# Patient Record
Sex: MALE | Race: WHITE | NOT HISPANIC OR LATINO | Employment: OTHER | URBAN - METROPOLITAN AREA
[De-identification: names, ages, dates, MRNs, and addresses within clinical notes are randomized per-mention and may not be internally consistent; named-entity substitution may affect disease eponyms.]

---

## 2017-01-05 ENCOUNTER — ALLSCRIPTS OFFICE VISIT (OUTPATIENT)
Dept: WOUND CARE | Facility: HOSPITAL | Age: 67
End: 2017-01-05
Attending: PODIATRIST
Payer: MEDICARE

## 2017-01-05 PROCEDURE — 15004 WOUND PREP F/N/HF/G: CPT | Performed by: PODIATRIST

## 2017-01-19 ENCOUNTER — ALLSCRIPTS OFFICE VISIT (OUTPATIENT)
Dept: WOUND CARE | Facility: HOSPITAL | Age: 67
End: 2017-01-19
Attending: PODIATRIST
Payer: MEDICARE

## 2017-01-19 PROCEDURE — 15275 SKIN SUB GRAFT FACE/NK/HF/G: CPT | Performed by: PODIATRIST

## 2017-01-26 ENCOUNTER — ALLSCRIPTS OFFICE VISIT (OUTPATIENT)
Dept: WOUND CARE | Facility: HOSPITAL | Age: 67
End: 2017-01-26
Attending: PODIATRIST
Payer: MEDICARE

## 2017-01-26 PROCEDURE — 97597 DBRDMT OPN WND 1ST 20 CM/<: CPT | Performed by: PODIATRIST

## 2017-02-02 ENCOUNTER — ALLSCRIPTS OFFICE VISIT (OUTPATIENT)
Dept: WOUND CARE | Facility: HOSPITAL | Age: 67
End: 2017-02-02
Attending: PODIATRIST
Payer: MEDICARE

## 2017-02-02 PROCEDURE — 15275 SKIN SUB GRAFT FACE/NK/HF/G: CPT | Performed by: PODIATRIST

## 2017-02-08 ENCOUNTER — ALLSCRIPTS OFFICE VISIT (OUTPATIENT)
Dept: OTHER | Facility: OTHER | Age: 67
End: 2017-02-08

## 2017-02-10 ENCOUNTER — ALLSCRIPTS OFFICE VISIT (OUTPATIENT)
Dept: WOUND CARE | Facility: HOSPITAL | Age: 67
End: 2017-02-10
Attending: PODIATRIST
Payer: MEDICARE

## 2017-02-10 PROCEDURE — 99211 OFF/OP EST MAY X REQ PHY/QHP: CPT | Performed by: PODIATRIST

## 2017-02-13 ENCOUNTER — ALLSCRIPTS OFFICE VISIT (OUTPATIENT)
Dept: OTHER | Facility: OTHER | Age: 67
End: 2017-02-13

## 2017-02-16 ENCOUNTER — ALLSCRIPTS OFFICE VISIT (OUTPATIENT)
Dept: WOUND CARE | Facility: HOSPITAL | Age: 67
End: 2017-02-16
Attending: PODIATRIST
Payer: MEDICARE

## 2017-02-16 PROCEDURE — 11042 DBRDMT SUBQ TIS 1ST 20SQCM/<: CPT | Performed by: PODIATRIST

## 2017-02-23 ENCOUNTER — ALLSCRIPTS OFFICE VISIT (OUTPATIENT)
Dept: WOUND CARE | Facility: HOSPITAL | Age: 67
End: 2017-02-23
Attending: PODIATRIST
Payer: MEDICARE

## 2017-02-23 PROCEDURE — 15275 SKIN SUB GRAFT FACE/NK/HF/G: CPT | Performed by: PODIATRIST

## 2017-03-02 ENCOUNTER — ALLSCRIPTS OFFICE VISIT (OUTPATIENT)
Dept: WOUND CARE | Facility: HOSPITAL | Age: 67
End: 2017-03-02
Attending: PODIATRIST
Payer: MEDICARE

## 2017-03-02 PROCEDURE — 99212 OFFICE O/P EST SF 10 MIN: CPT | Performed by: PODIATRIST

## 2017-03-09 ENCOUNTER — ALLSCRIPTS OFFICE VISIT (OUTPATIENT)
Dept: WOUND CARE | Facility: HOSPITAL | Age: 67
End: 2017-03-09
Attending: PODIATRIST
Payer: MEDICARE

## 2017-03-09 PROCEDURE — 11042 DBRDMT SUBQ TIS 1ST 20SQCM/<: CPT | Performed by: PODIATRIST

## 2017-03-16 ENCOUNTER — ALLSCRIPTS OFFICE VISIT (OUTPATIENT)
Dept: WOUND CARE | Facility: HOSPITAL | Age: 67
End: 2017-03-16
Attending: PODIATRIST
Payer: MEDICARE

## 2017-03-16 PROCEDURE — 99212 OFFICE O/P EST SF 10 MIN: CPT | Performed by: PODIATRIST

## 2017-03-30 ENCOUNTER — ALLSCRIPTS OFFICE VISIT (OUTPATIENT)
Dept: WOUND CARE | Facility: HOSPITAL | Age: 67
End: 2017-03-30
Attending: PODIATRIST
Payer: MEDICARE

## 2017-03-30 PROCEDURE — 99212 OFFICE O/P EST SF 10 MIN: CPT | Performed by: PODIATRIST

## 2017-04-10 DIAGNOSIS — Z12.5 ENCOUNTER FOR SCREENING FOR MALIGNANT NEOPLASM OF PROSTATE: ICD-10-CM

## 2017-04-10 DIAGNOSIS — E11.65 TYPE 2 DIABETES MELLITUS WITH HYPERGLYCEMIA (HCC): ICD-10-CM

## 2017-05-25 ENCOUNTER — ALLSCRIPTS OFFICE VISIT (OUTPATIENT)
Dept: WOUND CARE | Facility: HOSPITAL | Age: 67
End: 2017-05-25
Payer: MEDICARE

## 2017-05-25 PROCEDURE — 99213 OFFICE O/P EST LOW 20 MIN: CPT | Performed by: PODIATRIST

## 2017-05-25 PROCEDURE — 11042 DBRDMT SUBQ TIS 1ST 20SQCM/<: CPT | Performed by: PODIATRIST

## 2017-05-26 ENCOUNTER — LAB CONVERSION - ENCOUNTER (OUTPATIENT)
Dept: OTHER | Facility: OTHER | Age: 67
End: 2017-05-26

## 2017-05-26 ENCOUNTER — GENERIC CONVERSION - ENCOUNTER (OUTPATIENT)
Dept: OTHER | Facility: OTHER | Age: 67
End: 2017-05-26

## 2017-05-26 DIAGNOSIS — E11.51 TYPE 2 DIABETES MELLITUS WITH DIABETIC PERIPHERAL ANGIOPATHY WITHOUT GANGRENE (HCC): ICD-10-CM

## 2017-05-26 DIAGNOSIS — R74.8 ABNORMAL LEVELS OF OTHER SERUM ENZYMES: ICD-10-CM

## 2017-05-26 DIAGNOSIS — E11.65 TYPE 2 DIABETES MELLITUS WITH HYPERGLYCEMIA (HCC): ICD-10-CM

## 2017-05-26 LAB
A/G RATIO (HISTORICAL): 1.1 (CALC) (ref 1–2.5)
ALBUMIN SERPL BCP-MCNC: 4 G/DL (ref 3.6–5.1)
ALP SERPL-CCNC: 150 U/L (ref 40–115)
ALT SERPL W P-5'-P-CCNC: 10 U/L (ref 9–46)
AST SERPL W P-5'-P-CCNC: 13 U/L (ref 10–35)
BILIRUB SERPL-MCNC: 0.6 MG/DL (ref 0.2–1.2)
BUN SERPL-MCNC: 30 MG/DL (ref 7–25)
BUN/CREA RATIO (HISTORICAL): 23 (CALC) (ref 6–22)
CALCIUM SERPL-MCNC: 9.2 MG/DL (ref 8.6–10.3)
CHLORIDE SERPL-SCNC: 101 MMOL/L (ref 98–110)
CHOLEST SERPL-MCNC: 147 MG/DL (ref 125–200)
CHOLEST/HDLC SERPL: 4 (CALC)
CO2 SERPL-SCNC: 28 MMOL/L (ref 20–31)
CREAT SERPL-MCNC: 1.31 MG/DL (ref 0.7–1.25)
EGFR AFRICAN AMERICAN (HISTORICAL): 66 ML/MIN/1.73M2
EGFR-AMERICAN CALC (HISTORICAL): 57 ML/MIN/1.73M2
GAMMA GLOBULIN (HISTORICAL): 3.7 G/DL (CALC) (ref 1.9–3.7)
GLUCOSE (HISTORICAL): 156 MG/DL (ref 65–99)
HBA1C MFR BLD HPLC: 9.9 % OF TOTAL HGB
HDLC SERPL-MCNC: 37 MG/DL
LDL CHOLESTEROL (HISTORICAL): 77 MG/DL (CALC)
NON-HDL-CHOL (CHOL-HDL) (HISTORICAL): 110 MG/DL (CALC)
POTASSIUM SERPL-SCNC: 4.7 MMOL/L (ref 3.5–5.3)
PROSTATE SPECIFIC ANTIGEN TOTAL (HISTORICAL): 0.5 NG/ML
SODIUM SERPL-SCNC: 139 MMOL/L (ref 135–146)
TOTAL PROTEIN (HISTORICAL): 7.7 G/DL (ref 6.1–8.1)
TRIGL SERPL-MCNC: 163 MG/DL
TSH SERPL DL<=0.05 MIU/L-ACNC: 2.61 MIU/L (ref 0.4–4.5)

## 2017-06-01 ENCOUNTER — ALLSCRIPTS OFFICE VISIT (OUTPATIENT)
Dept: WOUND CARE | Facility: HOSPITAL | Age: 67
End: 2017-06-01
Attending: PODIATRIST
Payer: MEDICARE

## 2017-06-01 PROCEDURE — 11042 DBRDMT SUBQ TIS 1ST 20SQCM/<: CPT | Performed by: PODIATRIST

## 2017-06-02 ENCOUNTER — LAB CONVERSION - ENCOUNTER (OUTPATIENT)
Dept: OTHER | Facility: OTHER | Age: 67
End: 2017-06-02

## 2017-06-02 ENCOUNTER — GENERIC CONVERSION - ENCOUNTER (OUTPATIENT)
Dept: OTHER | Facility: OTHER | Age: 67
End: 2017-06-02

## 2017-06-02 LAB — GLUCOSE, 2 HR PP (HISTORICAL): 285 MG/DL

## 2017-06-05 ENCOUNTER — ALLSCRIPTS OFFICE VISIT (OUTPATIENT)
Dept: OTHER | Facility: OTHER | Age: 67
End: 2017-06-05

## 2017-06-08 ENCOUNTER — ALLSCRIPTS OFFICE VISIT (OUTPATIENT)
Dept: WOUND CARE | Facility: HOSPITAL | Age: 67
End: 2017-06-08
Attending: PODIATRIST
Payer: MEDICARE

## 2017-06-08 PROCEDURE — 99212 OFFICE O/P EST SF 10 MIN: CPT | Performed by: PODIATRIST

## 2017-06-22 ENCOUNTER — ALLSCRIPTS OFFICE VISIT (OUTPATIENT)
Dept: WOUND CARE | Facility: HOSPITAL | Age: 67
End: 2017-06-22
Attending: PODIATRIST
Payer: MEDICARE

## 2017-06-22 PROCEDURE — 97597 DBRDMT OPN WND 1ST 20 CM/<: CPT | Performed by: PODIATRIST

## 2017-06-29 ENCOUNTER — APPOINTMENT (OUTPATIENT)
Dept: WOUND CARE | Facility: HOSPITAL | Age: 67
End: 2017-06-29
Attending: PODIATRIST
Payer: MEDICARE

## 2017-06-29 PROCEDURE — 99212 OFFICE O/P EST SF 10 MIN: CPT | Performed by: PODIATRIST

## 2017-07-01 ENCOUNTER — HOSPITAL ENCOUNTER (OUTPATIENT)
Dept: ULTRASOUND IMAGING | Facility: HOSPITAL | Age: 67
Discharge: HOME | End: 2017-07-01
Payer: COMMERCIAL

## 2017-07-01 DIAGNOSIS — R74.8 ABNORMAL LEVELS OF OTHER SERUM ENZYMES: ICD-10-CM

## 2017-07-01 PROCEDURE — 76700 US EXAM ABDOM COMPLETE: CPT

## 2017-07-05 ENCOUNTER — GENERIC CONVERSION - ENCOUNTER (OUTPATIENT)
Dept: OTHER | Facility: OTHER | Age: 67
End: 2017-07-05

## 2017-07-13 ENCOUNTER — ALLSCRIPTS OFFICE VISIT (OUTPATIENT)
Dept: OTHER | Facility: OTHER | Age: 67
End: 2017-07-13

## 2017-07-13 ENCOUNTER — APPOINTMENT (OUTPATIENT)
Dept: WOUND CARE | Facility: HOSPITAL | Age: 67
End: 2017-07-13
Attending: PODIATRIST
Payer: MEDICARE

## 2017-07-13 PROCEDURE — 11042 DBRDMT SUBQ TIS 1ST 20SQCM/<: CPT | Performed by: PODIATRIST

## 2017-07-20 ENCOUNTER — APPOINTMENT (OUTPATIENT)
Dept: WOUND CARE | Facility: HOSPITAL | Age: 67
End: 2017-07-20
Attending: PODIATRIST
Payer: MEDICARE

## 2017-07-20 PROCEDURE — 11042 DBRDMT SUBQ TIS 1ST 20SQCM/<: CPT | Performed by: PODIATRIST

## 2017-07-27 ENCOUNTER — HOSPITAL ENCOUNTER (OUTPATIENT)
Dept: NON INVASIVE DIAGNOSTICS | Facility: CLINIC | Age: 67
Discharge: HOME | End: 2017-07-27
Payer: MEDICARE

## 2017-07-27 ENCOUNTER — APPOINTMENT (OUTPATIENT)
Dept: WOUND CARE | Facility: HOSPITAL | Age: 67
End: 2017-07-27
Attending: PODIATRIST
Payer: MEDICARE

## 2017-07-27 DIAGNOSIS — E11.51 TYPE 2 DIABETES MELLITUS WITH DIABETIC PERIPHERAL ANGIOPATHY WITHOUT GANGRENE (HCC): ICD-10-CM

## 2017-07-27 PROCEDURE — 93923 UPR/LXTR ART STDY 3+ LVLS: CPT

## 2017-07-27 PROCEDURE — 97597 DBRDMT OPN WND 1ST 20 CM/<: CPT | Performed by: PODIATRIST

## 2017-07-27 PROCEDURE — 93925 LOWER EXTREMITY STUDY: CPT

## 2017-07-30 ENCOUNTER — GENERIC CONVERSION - ENCOUNTER (OUTPATIENT)
Dept: OTHER | Facility: OTHER | Age: 67
End: 2017-07-30

## 2017-08-03 ENCOUNTER — APPOINTMENT (OUTPATIENT)
Dept: WOUND CARE | Facility: HOSPITAL | Age: 67
End: 2017-08-03
Attending: PODIATRIST
Payer: MEDICARE

## 2017-08-03 PROCEDURE — 97597 DBRDMT OPN WND 1ST 20 CM/<: CPT

## 2017-08-16 ENCOUNTER — ALLSCRIPTS OFFICE VISIT (OUTPATIENT)
Dept: OTHER | Facility: OTHER | Age: 67
End: 2017-08-16

## 2017-08-17 ENCOUNTER — APPOINTMENT (OUTPATIENT)
Dept: WOUND CARE | Facility: HOSPITAL | Age: 67
End: 2017-08-17
Attending: PODIATRIST
Payer: MEDICARE

## 2017-08-17 PROCEDURE — 99212 OFFICE O/P EST SF 10 MIN: CPT | Performed by: PODIATRIST

## 2017-08-31 ENCOUNTER — APPOINTMENT (OUTPATIENT)
Dept: WOUND CARE | Facility: HOSPITAL | Age: 67
End: 2017-08-31
Attending: PODIATRIST
Payer: MEDICARE

## 2017-08-31 ENCOUNTER — ALLSCRIPTS OFFICE VISIT (OUTPATIENT)
Dept: OTHER | Facility: OTHER | Age: 67
End: 2017-08-31

## 2017-08-31 PROCEDURE — 97597 DBRDMT OPN WND 1ST 20 CM/<: CPT | Performed by: PODIATRIST

## 2017-09-14 ENCOUNTER — APPOINTMENT (OUTPATIENT)
Dept: WOUND CARE | Facility: HOSPITAL | Age: 67
End: 2017-09-14
Attending: PODIATRIST
Payer: MEDICARE

## 2017-09-14 PROCEDURE — 99212 OFFICE O/P EST SF 10 MIN: CPT | Performed by: PODIATRIST

## 2017-09-20 ENCOUNTER — GENERIC CONVERSION - ENCOUNTER (OUTPATIENT)
Dept: OTHER | Facility: OTHER | Age: 67
End: 2017-09-20

## 2017-09-20 LAB
LEFT EYE DIABETIC RETINOPATHY: NORMAL
RIGHT EYE DIABETIC RETINOPATHY: NORMAL

## 2017-10-02 DIAGNOSIS — E78.5 HYPERLIPIDEMIA: ICD-10-CM

## 2017-10-02 DIAGNOSIS — E11.65 TYPE 2 DIABETES MELLITUS WITH HYPERGLYCEMIA (HCC): ICD-10-CM

## 2017-10-08 ENCOUNTER — APPOINTMENT (EMERGENCY)
Dept: RADIOLOGY | Facility: HOSPITAL | Age: 67
DRG: 683 | End: 2017-10-08
Payer: MEDICARE

## 2017-10-08 ENCOUNTER — APPOINTMENT (EMERGENCY)
Dept: NON INVASIVE DIAGNOSTICS | Facility: HOSPITAL | Age: 67
DRG: 683 | End: 2017-10-08
Payer: MEDICARE

## 2017-10-08 ENCOUNTER — HOSPITAL ENCOUNTER (INPATIENT)
Facility: HOSPITAL | Age: 67
LOS: 3 days | Discharge: NURSING, REHAB OR LONG-TERM CARE | DRG: 683 | End: 2017-10-11
Attending: FAMILY MEDICINE | Admitting: FAMILY MEDICINE
Payer: MEDICARE

## 2017-10-08 DIAGNOSIS — I48.20 CHRONIC ATRIAL FIBRILLATION (HCC): ICD-10-CM

## 2017-10-08 DIAGNOSIS — M10.9 GOUT FLARE: Primary | ICD-10-CM

## 2017-10-08 DIAGNOSIS — M79.89 SWELLING OF LIMB: ICD-10-CM

## 2017-10-08 DIAGNOSIS — N17.9 AKI (ACUTE KIDNEY INJURY) (HCC): ICD-10-CM

## 2017-10-08 LAB
ALBUMIN SERPL BCP-MCNC: 3.2 G/DL (ref 3.5–5)
ALP SERPL-CCNC: 178 U/L (ref 46–116)
ALT SERPL W P-5'-P-CCNC: 26 U/L (ref 12–78)
ANION GAP SERPL CALCULATED.3IONS-SCNC: 10 MMOL/L (ref 4–13)
APTT PPP: 38 SECONDS (ref 23–35)
AST SERPL W P-5'-P-CCNC: 15 U/L (ref 5–45)
BACTERIA UR QL AUTO: NORMAL /HPF
BASOPHILS # BLD AUTO: 0.01 THOUSANDS/ΜL (ref 0–0.1)
BASOPHILS NFR BLD AUTO: 0 % (ref 0–1)
BILIRUB SERPL-MCNC: 0.9 MG/DL (ref 0.2–1)
BILIRUB UR QL STRIP: NEGATIVE
BUN SERPL-MCNC: 30 MG/DL (ref 5–25)
CALCIUM SERPL-MCNC: 8.5 MG/DL (ref 8.3–10.1)
CHLORIDE SERPL-SCNC: 98 MMOL/L (ref 100–108)
CK SERPL-CCNC: 55 U/L (ref 39–308)
CLARITY UR: CLEAR
CO2 SERPL-SCNC: 26 MMOL/L (ref 21–32)
COLOR UR: YELLOW
CREAT SERPL-MCNC: 1.45 MG/DL (ref 0.6–1.3)
EOSINOPHIL # BLD AUTO: 0.05 THOUSAND/ΜL (ref 0–0.61)
EOSINOPHIL NFR BLD AUTO: 1 % (ref 0–6)
ERYTHROCYTE [DISTWIDTH] IN BLOOD BY AUTOMATED COUNT: 14.1 % (ref 11.6–15.1)
GFR SERPL CREATININE-BSD FRML MDRD: 50 ML/MIN/1.73SQ M
GLUCOSE SERPL-MCNC: 216 MG/DL (ref 65–140)
GLUCOSE SERPL-MCNC: 259 MG/DL (ref 65–140)
GLUCOSE SERPL-MCNC: 294 MG/DL (ref 65–140)
GLUCOSE UR STRIP-MCNC: NEGATIVE MG/DL
HCT VFR BLD AUTO: 41.2 % (ref 36.5–49.3)
HGB BLD-MCNC: 13 G/DL (ref 12–17)
HGB UR QL STRIP.AUTO: ABNORMAL
INR PPP: 1.13 (ref 0.86–1.16)
KETONES UR STRIP-MCNC: NEGATIVE MG/DL
LACTATE SERPL-SCNC: 1.4 MMOL/L (ref 0.5–2)
LEUKOCYTE ESTERASE UR QL STRIP: NEGATIVE
LYMPHOCYTES # BLD AUTO: 1.21 THOUSANDS/ΜL (ref 0.6–4.47)
LYMPHOCYTES NFR BLD AUTO: 14 % (ref 14–44)
MCH RBC QN AUTO: 28.5 PG (ref 26.8–34.3)
MCHC RBC AUTO-ENTMCNC: 31.6 G/DL (ref 31.4–37.4)
MCV RBC AUTO: 90 FL (ref 82–98)
MONOCYTES # BLD AUTO: 1.03 THOUSAND/ΜL (ref 0.17–1.22)
MONOCYTES NFR BLD AUTO: 12 % (ref 4–12)
NEUTROPHILS # BLD AUTO: 6.58 THOUSANDS/ΜL (ref 1.85–7.62)
NEUTS SEG NFR BLD AUTO: 73 % (ref 43–75)
NITRITE UR QL STRIP: NEGATIVE
NON-SQ EPI CELLS URNS QL MICRO: NORMAL /HPF
NT-PROBNP SERPL-MCNC: 1606 PG/ML
PH UR STRIP.AUTO: 5.5 [PH] (ref 4.5–8)
PLATELET # BLD AUTO: 228 THOUSANDS/UL (ref 149–390)
PMV BLD AUTO: 10.2 FL (ref 8.9–12.7)
POTASSIUM SERPL-SCNC: 4.5 MMOL/L (ref 3.5–5.3)
PROT SERPL-MCNC: 8.3 G/DL (ref 6.4–8.2)
PROT UR STRIP-MCNC: NEGATIVE MG/DL
PROTHROMBIN TIME: 14.3 SECONDS (ref 12.1–14.4)
RBC # BLD AUTO: 4.56 MILLION/UL (ref 3.88–5.62)
RBC #/AREA URNS AUTO: NORMAL /HPF
SODIUM SERPL-SCNC: 134 MMOL/L (ref 136–145)
SP GR UR STRIP.AUTO: 1.02 (ref 1–1.03)
TROPONIN I SERPL-MCNC: <0.02 NG/ML
UROBILINOGEN UR QL STRIP.AUTO: 0.2 E.U./DL
WBC # BLD AUTO: 8.88 THOUSAND/UL (ref 4.31–10.16)
WBC #/AREA URNS AUTO: NORMAL /HPF

## 2017-10-08 PROCEDURE — 80053 COMPREHEN METABOLIC PANEL: CPT | Performed by: PHYSICIAN ASSISTANT

## 2017-10-08 PROCEDURE — 93005 ELECTROCARDIOGRAM TRACING: CPT | Performed by: PHYSICIAN ASSISTANT

## 2017-10-08 PROCEDURE — 85730 THROMBOPLASTIN TIME PARTIAL: CPT | Performed by: PHYSICIAN ASSISTANT

## 2017-10-08 PROCEDURE — 99285 EMERGENCY DEPT VISIT HI MDM: CPT

## 2017-10-08 PROCEDURE — 85025 COMPLETE CBC W/AUTO DIFF WBC: CPT | Performed by: PHYSICIAN ASSISTANT

## 2017-10-08 PROCEDURE — 73090 X-RAY EXAM OF FOREARM: CPT

## 2017-10-08 PROCEDURE — 71020 HB CHEST X-RAY 2VW FRONTAL&LATL: CPT

## 2017-10-08 PROCEDURE — 73130 X-RAY EXAM OF HAND: CPT

## 2017-10-08 PROCEDURE — 82550 ASSAY OF CK (CPK): CPT | Performed by: PHYSICIAN ASSISTANT

## 2017-10-08 PROCEDURE — 96376 TX/PRO/DX INJ SAME DRUG ADON: CPT

## 2017-10-08 PROCEDURE — 83605 ASSAY OF LACTIC ACID: CPT | Performed by: PHYSICIAN ASSISTANT

## 2017-10-08 PROCEDURE — 85610 PROTHROMBIN TIME: CPT | Performed by: PHYSICIAN ASSISTANT

## 2017-10-08 PROCEDURE — 36415 COLL VENOUS BLD VENIPUNCTURE: CPT | Performed by: PHYSICIAN ASSISTANT

## 2017-10-08 PROCEDURE — 96374 THER/PROPH/DIAG INJ IV PUSH: CPT

## 2017-10-08 PROCEDURE — 83880 ASSAY OF NATRIURETIC PEPTIDE: CPT | Performed by: PHYSICIAN ASSISTANT

## 2017-10-08 PROCEDURE — 84484 ASSAY OF TROPONIN QUANT: CPT | Performed by: PHYSICIAN ASSISTANT

## 2017-10-08 PROCEDURE — 93971 EXTREMITY STUDY: CPT

## 2017-10-08 PROCEDURE — 81001 URINALYSIS AUTO W/SCOPE: CPT | Performed by: PHYSICIAN ASSISTANT

## 2017-10-08 PROCEDURE — 87040 BLOOD CULTURE FOR BACTERIA: CPT | Performed by: PHYSICIAN ASSISTANT

## 2017-10-08 PROCEDURE — 82948 REAGENT STRIP/BLOOD GLUCOSE: CPT

## 2017-10-08 RX ORDER — ALLOPURINOL 300 MG/1
300 TABLET ORAL DAILY
Status: DISCONTINUED | OUTPATIENT
Start: 2017-10-09 | End: 2017-10-11 | Stop reason: HOSPADM

## 2017-10-08 RX ORDER — HEPARIN SODIUM 5000 [USP'U]/ML
5000 INJECTION, SOLUTION INTRAVENOUS; SUBCUTANEOUS EVERY 8 HOURS SCHEDULED
Status: DISCONTINUED | OUTPATIENT
Start: 2017-10-08 | End: 2017-10-11 | Stop reason: HOSPADM

## 2017-10-08 RX ORDER — PREDNISONE 20 MG/1
40 TABLET ORAL ONCE
Status: COMPLETED | OUTPATIENT
Start: 2017-10-08 | End: 2017-10-08

## 2017-10-08 RX ORDER — SODIUM CHLORIDE 9 MG/ML
100 INJECTION, SOLUTION INTRAVENOUS ONCE
Status: COMPLETED | OUTPATIENT
Start: 2017-10-08 | End: 2017-10-10

## 2017-10-08 RX ORDER — MORPHINE SULFATE 4 MG/ML
4 INJECTION, SOLUTION INTRAMUSCULAR; INTRAVENOUS ONCE
Status: COMPLETED | OUTPATIENT
Start: 2017-10-08 | End: 2017-10-08

## 2017-10-08 RX ORDER — LOSARTAN POTASSIUM 50 MG/1
50 TABLET ORAL DAILY
Status: DISCONTINUED | OUTPATIENT
Start: 2017-10-09 | End: 2017-10-11 | Stop reason: HOSPADM

## 2017-10-08 RX ORDER — PREDNISONE 20 MG/1
40 TABLET ORAL DAILY
Status: DISCONTINUED | OUTPATIENT
Start: 2017-10-09 | End: 2017-10-09

## 2017-10-08 RX ORDER — ALBUTEROL SULFATE 90 UG/1
2 AEROSOL, METERED RESPIRATORY (INHALATION) EVERY 6 HOURS PRN
Status: DISCONTINUED | OUTPATIENT
Start: 2017-10-08 | End: 2017-10-11 | Stop reason: HOSPADM

## 2017-10-08 RX ORDER — COLCHICINE 0.6 MG/1
1.2 TABLET ORAL DAILY
Status: DISCONTINUED | OUTPATIENT
Start: 2017-10-09 | End: 2017-10-09

## 2017-10-08 RX ADMIN — INSULIN DETEMIR 50 UNITS: 100 INJECTION, SOLUTION SUBCUTANEOUS at 18:11

## 2017-10-08 RX ADMIN — MORPHINE SULFATE 4 MG: 4 INJECTION, SOLUTION INTRAMUSCULAR; INTRAVENOUS at 13:36

## 2017-10-08 RX ADMIN — SODIUM CHLORIDE 100 ML/HR: 0.9 INJECTION, SOLUTION INTRAVENOUS at 18:54

## 2017-10-08 RX ADMIN — PREDNISONE 40 MG: 20 TABLET ORAL at 12:54

## 2017-10-08 RX ADMIN — INSULIN LISPRO 2 UNITS: 100 INJECTION, SOLUTION INTRAVENOUS; SUBCUTANEOUS at 18:54

## 2017-10-08 RX ADMIN — MORPHINE SULFATE 4 MG: 4 INJECTION, SOLUTION INTRAMUSCULAR; INTRAVENOUS at 11:47

## 2017-10-08 RX ADMIN — METOPROLOL TARTRATE 25 MG: 25 TABLET ORAL at 19:02

## 2017-10-08 RX ADMIN — SODIUM CHLORIDE 500 ML: 0.9 INJECTION, SOLUTION INTRAVENOUS at 12:52

## 2017-10-08 RX ADMIN — HEPARIN SODIUM 5000 UNITS: 5000 INJECTION, SOLUTION INTRAVENOUS; SUBCUTANEOUS at 18:12

## 2017-10-08 RX ADMIN — INSULIN LISPRO 3 UNITS: 100 INJECTION, SOLUTION INTRAVENOUS; SUBCUTANEOUS at 22:54

## 2017-10-08 RX ADMIN — HYDROMORPHONE HYDROCHLORIDE 0.5 MG: 1 INJECTION, SOLUTION INTRAMUSCULAR; INTRAVENOUS; SUBCUTANEOUS at 18:12

## 2017-10-08 NOTE — H&P
H&P Exam - Jose J Jalil Cummins 74 y o  male MRN: [id number]    Unit/Bed#: ED 01 Encounter: [id number]    Assessment:    Acute gout Exacerbation  Continue allopurinol  Initiate Colchrys 1 2 mg p  o  daily  Initiate prednisone 40 mg p o  Daily  PT/OT    Acute kidney injury:    Unclear at this is patient's new baseline, will give 1 L normal saline and check renal function in the morning  We will hold Lasix 40 mg b i d  X 24 hours    Type 2 diabetes mellitus with hyperglycemia  Continue Levemir  Accu-Cheks q a c  and HS, with insulin sliding scale    Essential hypertension  Continue Cozaar and metoprolol  Hold Lasix    LOS > 2 midnights  Full Code  Heparin for DVT Prophy          History of Present Illness   The patient is a pleasant 44-year-old male with past medical history significant for gout, type 2 diabetes mellitus, essential hypertension who presents today with a chief complaint of right wrist pain that began on Thursday after he ate saurkraut and sausage  He states he's had difficulty moving his hand over the past 24 hours, it became more swollen over the past 2 days and more painful  States that this is his usual spot where he gets gout flares     Review of Systems   Constitutional: Negative  HENT: Negative  Eyes: Negative  Respiratory: Negative  Cardiovascular: Negative  Gastrointestinal: Negative  Endocrine: Negative  Musculoskeletal: Positive for arthralgias and joint swelling  Skin: Negative for rash and wound  Allergic/Immunologic: Negative  Neurological: Negative  Hematological: Negative  Psychiatric/Behavioral: Negative          Historical Information   Past Medical History:   Diagnosis Date    [facility]est[facility] )     COPD [facility]chronic obstructive pulmonary disease) ([facility] )     Diabetes mellitus ([facility] )     Hypertension      Past Surgical History:   Procedure Laterality Date    TENDON REPAIR       Social History   History   Alcohol Use No     History Drug Use No     History   Smoking Status    Never Smoker   Smokeless Tobacco    Never Used     Family History: non-contributory    Meds/Allergies   all medications and allergies reviewed  Allergies   Allergen Reactions    Latex Dermatitis       Objective   First Vitals:   Blood Pressure: 161/75 (10/08/17 1046)  Pulse: 81 (10/08/17 1046)  Temperature: 99 1 °F (37 3 °C) (10/08/17 1046)  Respirations: 20 (10/08/17 1046)  Height: 6' 3" (190 5 cm) (10/08/17 1046)  Weight - Scale: (!) 163 kg (360 lb) (10/08/17 1046)  SpO2: 95 % (10/08/17 1046)    Current Vitals:   Blood Pressure: 160/73 (10/08/17 1345)  Pulse: 85 (10/08/17 1345)  Temperature: 99 1 °F (37 3 °C) (10/08/17 1046)  Respirations: (!) 26 (10/08/17 1345)  Height: 6' 3" (190 5 cm) (10/08/17 1046)  Weight - Scale: (!) 163 kg (360 lb) (10/08/17 1046)  SpO2: 94 % (10/08/17 1345)    No intake or output data in the 24 hours ending 10/08/17 1359    Invasive Devices     Peripheral Intravenous Line            Peripheral IV 10/08/17 Left Antecubital less than 1 day                Physical Exam   Constitutional: He appears well-nourished  No distress  HENT:   Head: Normocephalic and atraumatic  Mouth/Throat: No oropharyngeal exudate  Eyes: EOM are normal  No scleral icterus  Neck: Neck supple  No JVD present  Cardiovascular: Normal rate and regular rhythm  Pulmonary/Chest: Effort normal and breath sounds normal  No respiratory distress  He has no wheezes  He has no rales  Abdominal: Soft  Bowel sounds are normal  There is no tenderness  There is no rebound and no guarding  Musculoskeletal: He exhibits edema  Right wrist: decreased ROM  Palpable radial and ulnar pulses  Diffuse edema up to elbow   No surrounding erythema     Neurological: He is alert  No cranial nerve deficit  Skin: No erythema  Lower extremity chronic venous changes    Psychiatric: He has a normal mood and affect         Lab Results:   Lab Results   Component Value Date    WBC 8 88 10/08/2017    HGB 13 0 10/08/2017    HCT 41 2 10/08/2017    MCV 90 10/08/2017     10/08/2017     Lab Results   Component Value Date    GLUCOSE 259 (H) 10/08/2017    CALCIUM 8 5 10/08/2017     (L) 10/08/2017    K 4 5 10/08/2017    CO2 26 10/08/2017    CL 98 (L) 10/08/2017    BUN 30 (H) 10/08/2017    CREATININE 1 45 (H) 10/08/2017       Imaging:   XR forearm 2 views RIGHT   ED Interpretation   No acute osseous abnormality       Final Result      No acute osseous abnormality  Workstation performed: [id number]         XR hand 3+ views RIGHT   ED Interpretation   No acute osseous abnormality       Final Result      No acute osseous abnormality  Dorsal soft tissue swelling hand and wrist          Workstation performed: [id number]         XR chest 2 views   ED Interpretation   Bilateral patchy infiltrates   Cardiomegaly       VAS upper limb venous duplex scan, unilateral/limited    (Results Pending)       EKG, Pathology, and Other Studies:   sinus    Code Status: Prior  Advance Directive and Living Will:      Power of :    POLST:      Counseling / Coordination of Care:

## 2017-10-08 NOTE — ED NOTES
Patient complaisn ofpain in his R arm and swelling    Pain radiates up into his R side of his chest     Angelica Contreras RN  10/08/17 8607

## 2017-10-08 NOTE — ED NOTES
Patient complains of abd tenderness    Pt states he has a hernia in his abd, when patient its up it protrudes and is visible     Tan Edwards RN  10/08/17 9089

## 2017-10-08 NOTE — ED NOTES
Placed pillow under right arm to elevate    Ice pack applied to R hand and wrist       Natalia Tam RN  10/08/17 5776

## 2017-10-08 NOTE — ED NOTES
Bilateral leg swelling noted Leg larger than R   Skin discolored and thick in texture     Clay Jain RN  10/08/17 8532

## 2017-10-08 NOTE — ED PROVIDER NOTES
History  Chief Complaint   Patient presents with    Arm Swelling     Patient states he has had swelling in his R hand and wrist since Friday  Pt states the pain radiates up into his arm and into his chest on the R     75 yo male presents for evaluation of CHF, COPD, DM, HTN, hernia, presents for evaluation of right arm pain and swelling  Patient reports symptoms started on Friday and since then progressed  Pain is in the entire right arm with associated swelling  He also reports pain in the right side now that he thinks about it as well  Localized to the right flank  He also states that he has been having chills, requiring extra blanket last night  Per patient, he does not recall any fevers, headache, dizziness, lightheadedness, nausea, vomiting, chest pain, SOB, palpitations  He does report diarrhea, dysuria, hematuria  He reports that he believes this is gout  He had a similar flare in the past 9 years ago  He can not recall exactly what they gave him to help with the flare  Believes it was indomethacin  Per pt, everything he eats puts him at risk for a flare  He also has edema in the lower extremities with associated discoloration  Per patient, this is not new and is currently being evaluated by the family doctor  No recent changes in his medications  Prior to Admission Medications   Prescriptions Last Dose Informant Patient Reported? Taking? albuterol (PROVENTIL HFA,VENTOLIN HFA) 90 mcg/act inhaler   Yes Yes   Sig: Inhale 2 puffs every 6 (six) hours as needed for wheezing  allopurinol (ZYLOPRIM) 300 mg tablet   Yes Yes   Sig: Take 300 mg by mouth daily  fluticasone-salmeterol (ADVAIR) 250-50 mcg/dose inhaler   Yes Yes   Sig: Inhale 1 puff every 12 (twelve) hours  furosemide (LASIX) 40 mg tablet   Yes Yes   Sig: Take 40 mg by mouth 2 (two) times a day     insulin detemir (LEVEMIR) 100 units/mL subcutaneous injection   Yes Yes   Sig: Inject 50 Units under the skin 2 (two) times a day Indications: Type 2 Diabetes, morning and HS    losartan (COZAAR) 50 mg tablet   Yes Yes   Sig: Take 50 mg by mouth daily  metoprolol tartrate (LOPRESSOR) 25 mg tablet   Yes Yes   Sig: Take 25 mg by mouth 2 (two) times a day  Facility-Administered Medications: None       Past Medical History:   Diagnosis Date    [facility]     COPD (chronic obstructive pulmonary disease) ([place] [address] )     Diabetes mellitus ([place] [address] )     Hypertension        Past Surgical History:   Procedure Laterality Date    TENDON REPAIR         History reviewed  No pertinent family history  I have reviewed and agree with the history as documented  Social History   Substance Use Topics    Smoking status: Never Smoker    Smokeless tobacco: Never Used    Alcohol use No        Review of Systems   Constitutional: Negative for activity change, appetite change, chills, fatigue and fever  HENT: Negative for congestion, postnasal drip, rhinorrhea, sore throat, trouble swallowing and voice change  Eyes: Negative for photophobia and visual disturbance  Respiratory: Negative for cough and shortness of breath  Cardiovascular: Negative for chest pain  Gastrointestinal: Negative for abdominal pain, constipation, diarrhea, nausea and vomiting  Endocrine: Negative for cold intolerance and heat intolerance  Genitourinary: Negative for dysuria, flank pain, frequency, hematuria and urgency  Musculoskeletal: Negative for back pain, gait problem, neck pain and neck stiffness  Skin: Negative for rash and wound  Allergic/Immunologic: Negative for food allergies  Neurological: Negative for dizziness, weakness, light-headedness, numbness and headaches  Hematological: Negative for adenopathy         Physical Exam  ED Triage Vitals   Temperature Pulse Respirations Blood Pressure SpO2   10/08/17 1046 10/08/17 1046 10/08/17 1046 10/08/17 1046 10/08/17 1046   99 1 °F (37 3 °C) 81 20 161/75 95 %      Temp src Heart Rate Source Patient Position - Orthostatic VS BP Location FiO2 (%)   -- 10/08/17 1145 10/08/17 1145 10/08/17 1145 --    Monitor Lying Left arm       Pain Score       10/08/17 1046       Worst Possible Pain           Physical Exam   Constitutional: He is oriented to person, place, and time  He appears well-developed and well-nourished  No distress  obese   HENT:   Head: Normocephalic and atraumatic  Eyes: Conjunctivae and EOM are normal  Pupils are equal, round, and reactive to light  Neck: Normal range of motion  Neck supple  Cardiovascular: Normal rate, regular rhythm, normal heart sounds and intact distal pulses  Exam reveals no gallop and no friction rub  No murmur heard  Pulmonary/Chest: Effort normal and breath sounds normal  No respiratory distress  He has no wheezes  He has no rales  He exhibits no tenderness  Crackles at the bases   Abdominal: Soft  Bowel sounds are normal    Generalized abdominal discomfort  Hernia umbilically  No cva tenderness   Musculoskeletal: He exhibits edema and tenderness  He exhibits no deformity  Pain in right arm from shoulder joint down  There is also noticeable swelling  No warmth  Radial pulse strong  Decreased ROM due to pain  No discoloration  Neurological: He is alert and oriented to person, place, and time  Skin: Skin is warm and dry  He is not diaphoretic  No pallor  Bilateral lower extremities with signs of vascular deficiency secondary to discoloration to the lower extremities bilaterally  Also with dry skin  Psychiatric: He has a normal mood and affect  His behavior is normal  Judgment and thought content normal    Vitals reviewed        ED Medications  Medications   sodium chloride 0 9 % bolus 500 mL (0 mL Intravenous Stopped 10/8/17 1252)   morphine (PF) 4 mg/mL injection 4 mg (4 mg Intravenous Given 10/8/17 1147)   predniSONE tablet 40 mg (40 mg Oral Given 10/8/17 1254)   morphine (PF) 4 mg/mL injection 4 mg (4 mg Intravenous Given 10/8/17 1336) Diagnostic Studies  Labs Reviewed   COMPREHENSIVE METABOLIC PANEL - Abnormal        Result Value Ref Range Status    Sodium 134 (*) 136 - 145 mmol/L Final    Chloride 98 (*) 100 - 108 mmol/L Final    BUN 30 (*) 5 - 25 mg/dL Final    Creatinine 1 45 (*) 0 60 - 1 30 mg/dL Final    Comment: Standardized to IDMS reference method    Glucose 259 (*) 65 - 140 mg/dL Final    Comment:   If the patient is fasting, the ADA then defines impaired fasting glucose as > 100 mg/dL and diabetes as > or equal to 123 mg/dL  Specimen collection should occur prior to Sulfasalazine administration due to the potential for falsely depressed results  Specimen collection should occur prior to Sulfapyridine administration due to the potential for falsely elevated results  Alkaline Phosphatase 178 (*) 46 - 116 U/L Final    Total Protein 8 3 (*) 6 4 - 8 2 g/dL Final    Albumin 3 2 (*) 3 5 - 5 0 g/dL Final    Potassium 4 5  3 5 - 5 3 mmol/L Final    CO2 26  21 - 32 mmol/L Final    Anion Gap 10  4 - 13 mmol/L Final    Calcium 8 5  8 3 - 10 1 mg/dL Final    AST 15  5 - 45 U/L Final    Comment:   Specimen collection should occur prior to Sulfasalazine administration due to the potential for falsely depressed results  ALT 26  12 - 78 U/L Final    Comment:   Specimen collection should occur prior to Sulfasalazine administration due to the potential for falsely depressed results  Total Bilirubin 0 90  0 20 - 1 00 mg/dL Final    eGFR 50  ml/min/1 73sq m Final    Narrative:     National Kidney Disease Education Program recommendations are as follows:  GFR calculation is accurate only with a steady state creatinine  Chronic Kidney disease less than 60 ml/min/1 73 sq  meters  Kidney failure less than 15 ml/min/1 73 sq  meters  APTT - Abnormal     PTT 38 (*) 23 - 35 seconds Final    Narrative:      Therapeutic Heparin Range = 60-90 seconds   NT-BNP PRO (BRAIN NATRIURETIC PEPTIDE) - Abnormal     NT-proBNP 1,606 (*) <125 pg/mL Final URINALYSIS WITH REFLEX TO MICROSCOPIC - Abnormal     Blood, UA Trace-Intact (*) Negative Final    Color, UA Yellow   Final    Clarity, UA Clear   Final    Specific Gravity, UA 1 025  1 003 - 1 030 Final    pH, UA 5 5  4 5 - 8 0 Final    Leukocytes, UA Negative  Negative Final    Nitrite, UA Negative  Negative Final    Protein, UA Negative  Negative mg/dl Final    Glucose, UA Negative  Negative mg/dl Final    Ketones, UA Negative  Negative mg/dl Final    Urobilinogen, UA 0 2  0 2, 1 0 E U /dl E U /dl Final    Bilirubin, UA Negative  Negative Final   CBC AND DIFFERENTIAL - Normal    WBC 8 88  4 31 - 10 16 Thousand/uL Final    RBC 4 56  3 88 - 5 62 Million/uL Final    Hemoglobin 13 0  12 0 - 17 0 g/dL Final    Hematocrit 41 2  36 5 - 49 3 % Final    MCV 90  82 - 98 fL Final    MCH 28 5  26 8 - 34 3 pg Final    MCHC 31 6  31 4 - 37 4 g/dL Final    RDW 14 1  11 6 - 15 1 % Final    MPV 10 2  8 9 - 12 7 fL Final    Platelets 725  361 - 390 Thousands/uL Final    Neutrophils Relative 73  43 - 75 % Final    Lymphocytes Relative 14  14 - 44 % Final    Monocytes Relative 12  4 - 12 % Final    Eosinophils Relative 1  0 - 6 % Final    Basophils Relative 0  0 - 1 % Final    Neutrophils Absolute 6 58  1 85 - 7 62 Thousands/µL Final    Lymphocytes Absolute 1 21  0 60 - 4 47 Thousands/µL Final    Monocytes Absolute 1 03  0 17 - 1 22 Thousand/µL Final    Eosinophils Absolute 0 05  0 00 - 0 61 Thousand/µL Final    Basophils Absolute 0 01  0 00 - 0 10 Thousands/µL Final   LACTIC ACID, PLASMA - Normal    LACTIC ACID 1 4  0 5 - 2 0 mmol/L Final    Narrative:     Result may be elevated if tourniquet was used during collection     PROTIME-INR - Normal    Protime 14 3  12 1 - 14 4 seconds Final    INR 1 13  0 86 - 1 16 Final   TROPONIN I - Normal    Troponin I <0 02  <=0 04 ng/mL Final    Comment: 3Autovalidation override    Narrative:     [employer] Chemistry analyzer 99% cutoff is > 0 04 ng/mL in network labs    o cTnI 99% cutoff is useful only when applied to patients in the clinical setting of myocardial ischemia  o cTnI 99% cutoff should be interpreted in the context of clinical history, ECG findings and possibly cardiac imaging to establish correct diagnosis  o cTnI 99% cutoff may be suggestive but clearly not indicative of a coronary event without the clinical setting of myocardial ischemia  CK - Normal    Total CK 55  39 - 308 U/L Final   BLOOD CULTURE   BLOOD CULTURE   UA W REFLEX TO MICROSCOPIC WITH REFLEX TO CULTURE    Color, UA     Corrected    Comment: This is a corrected result  Previous result was Dark Julieth on 10/8/2017 at 1450 EDT    Clarity, UA     Corrected    Comment: This is a corrected result  Previous result was Clear on 10/8/2017 at 1450 EDT    Specific [place], UA    1 003 - 1 030 Corrected    Comment: This is a corrected result  Previous result was 1 020 on 10/8/2017 at 1450 EDT    pH, UA    4 5 - 8 0 Corrected    Comment: This is a corrected result  Previous result was 6 0 on 10/8/2017 at 1450 EDT    Leukocytes, UA    Negative Corrected    Comment: This is a corrected result  Previous result was Negative on 10/8/2017 at 1450 EDT    Nitrite, UA    Negative Corrected    Comment: This is a corrected result  Previous result was Negative on 10/8/2017 at 1450 EDT    Protein, UA    Negative mg/dl Corrected    Comment: This is a corrected result  Previous result was Negative mg/dl on 10/8/2017 at 1450 EDT    Glucose, UA    Negative mg/dl Corrected    Comment: This is a corrected result  Previous result was Negative mg/dl on 10/8/2017 at 1450 EDT    Ketones, UA    Negative mg/dl Corrected    Comment: This is a corrected result  Previous result was Trace mg/dl on 10/8/2017 at 1450 EDT    Urobilinogen, UA    0 2, 1 0 E U /dl E U /dl Corrected    Comment: This is a corrected result   Previous result was 1 0 E U /dl on 10/8/2017 at 1450 EDT    Bilirubin, UA    Negative Corrected    Comment: The dipstick result may be falsely positive do to interfering substances  We recommend reliance upon serum bilirubin, liver & kidney function tests to guide patient care if clinically indicated  This is a corrected result  Previous result was Interference- unable to analyze on 10/8/2017 at 1450 EDT    Blood, UA    Negative Corrected    Comment: This is a corrected result  Previous result was Negative on 10/8/2017 at 1450 EDT    Narrative:     Specimen mis-labeled by ER   UA W REFLEX TO MICROSCOPIC WITH REFLEX TO CULTURE       XR chest 2 views   ED Interpretation   Bilateral patchy infiltrates  Cardiomegaly       Final Result      Stable cardiomegaly  Patchy bibasilar atelectasis  Workstation performed: [id number]         XR forearm 2 views RIGHT   ED Interpretation   No acute osseous abnormality       Final Result      No acute osseous abnormality  Workstation performed: [id number]         XR hand 3+ views RIGHT   ED Interpretation   No acute osseous abnormality       Final Result      No acute osseous abnormality  Dorsal soft tissue swelling hand and wrist          Workstation performed: [id number]         VAS upper limb venous duplex scan, unilateral/limited    (Results Pending)       Procedures  ECG 12 Lead Documentation  Date/Time: 10/8/2017 3:46 PM  Performed by: Alvino Ott  Authorized by: Alvino Ott     Indications / Diagnosis:  Eval  Patient location:  ED  Previous ECG:     Previous ECG:  Compared to current    Similarity:  No change  Rate:     ECG rate:  84    ECG rate assessment: normal    Rhythm:     Rhythm: atrial fibrillation    Ectopy:     Ectopy: none    QRS:     QRS axis:  Normal    QRS intervals:  Normal  Conduction:     Conduction: normal    T waves:     T waves: non-specific            Phone Contacts  ED Phone Contact    ED Course  ED Course as of Oct 08 1558   Sun Oct 08, 2017   1221 Glucose: (!) 259   1251 VAS negative for DVT    1255 Spoke to 61Marlon Oneal   Inquiring about arterial flow  Concern for ischemia   Will contact tech    1313 Trying to get a hold of vascular  They are not answering page  [phone] Per nurse                                MDM  Number of Diagnoses or Management Options  DORA (acute kidney injury) [facility]):   Gout flare:   Diagnosis management comments: Diff dx includes but is not limited to gout flare, DVT, rhabdo, compartment syndrome, ischemia, infectious  Action: pt with multiple comorbidies  Will obtain workup due to numerous complaints including VAS duplex  Results: Decreased kidney function  Will hold on NSAIDs for likely gout flare  Will start steroids  Continue with pain control  Consult [facility] for possible admission due to multiple health issues and extensive gout flare  Also with unknown DORA? Compared to one year ago there is a noted increased in creatinine and decrease in GFR  Unsure if this is the new baseline  Plan: Admit to [facility] for further management  Amount and/or Complexity of Data Reviewed  Clinical lab tests: ordered and reviewed  Tests in the radiology section of CPT®: ordered and reviewed  Review and summarize past medical records: yes  Discuss the patient with other providers: yes  Independent visualization of images, tracings, or specimens: yes      CritCare Time    Disposition  Final diagnoses:   Gout flare   DORA (acute kidney injury) [facility])     ED Disposition     ED Disposition Condition Comment    Admit  Case was discussed with [facility] and the patient's admission status was agreed to be Admission Status: inpatient status to the service of Dr Alka Veloz   Follow-up Information    None       Current Discharge Medication List      CONTINUE these medications which have NOT CHANGED    Details   albuterol (PROVENTIL HFA,VENTOLIN HFA) 90 mcg/act inhaler Inhale 2 puffs every 6 (six) hours as needed for wheezing  allopurinol (ZYLOPRIM) 300 mg tablet Take 300 mg by mouth daily  fluticasone-salmeterol (ADVAIR) 250-50 mcg/dose inhaler Inhale 1 puff every 12 (twelve) hours  furosemide (LASIX) 40 mg tablet Take 40 mg by mouth 2 (two) times a day  insulin detemir (LEVEMIR) 100 units/mL subcutaneous injection Inject 50 Units under the skin 2 (two) times a day Indications: Type 2 Diabetes, morning and HS       losartan (COZAAR) 50 mg tablet Take 50 mg by mouth daily  metoprolol tartrate (LOPRESSOR) 25 mg tablet Take 25 mg by mouth 2 (two) times a day  No discharge procedures on file      ED Provider  Electronically Signed by       Adina Estevez PA-C  10/08/17 9851

## 2017-10-08 NOTE — ED NOTES
Son at bedside  Son and pt describe pt's living situation as follows:  Pt lives with sister whom is oxygen dependant, unable to contribute to helping pt  Pt describes he is unable to care for himself at home  Pt presented with urine stained underwear and feces stained underwear and pants  Pt son does not visit regularly and states, "I live in [facility] [place] [facility] and I guess I need to try to make it up once a week to check on him"        Timothy Storm, MARKIE  10/08/17 2630

## 2017-10-09 PROBLEM — E66.01 MORBID OBESITY (HCC): Status: ACTIVE | Noted: 2017-10-09

## 2017-10-09 PROBLEM — I48.20 CHRONIC ATRIAL FIBRILLATION (HCC): Status: ACTIVE | Noted: 2017-10-09

## 2017-10-09 PROBLEM — M10.9 ACUTE GOUT: Status: ACTIVE | Noted: 2017-10-09

## 2017-10-09 LAB
ANION GAP SERPL CALCULATED.3IONS-SCNC: 6 MMOL/L (ref 4–13)
ATRIAL RATE: 75 BPM
BILIRUB UR QL STRIP: NEGATIVE
BUN SERPL-MCNC: 29 MG/DL (ref 5–25)
CALCIUM SERPL-MCNC: 8.3 MG/DL (ref 8.3–10.1)
CHLORIDE SERPL-SCNC: 102 MMOL/L (ref 100–108)
CLARITY UR: CLEAR
CO2 SERPL-SCNC: 27 MMOL/L (ref 21–32)
COLOR UR: YELLOW
CREAT SERPL-MCNC: 1.1 MG/DL (ref 0.6–1.3)
ERYTHROCYTE [DISTWIDTH] IN BLOOD BY AUTOMATED COUNT: 13.9 % (ref 11.6–15.1)
GFR SERPL CREATININE-BSD FRML MDRD: 70 ML/MIN/1.73SQ M
GLUCOSE SERPL-MCNC: 189 MG/DL (ref 65–140)
GLUCOSE SERPL-MCNC: 202 MG/DL (ref 65–140)
GLUCOSE SERPL-MCNC: 258 MG/DL (ref 65–140)
GLUCOSE SERPL-MCNC: 330 MG/DL (ref 65–140)
GLUCOSE SERPL-MCNC: 363 MG/DL (ref 65–140)
GLUCOSE UR STRIP-MCNC: NEGATIVE MG/DL
HCT VFR BLD AUTO: 37.1 % (ref 36.5–49.3)
HGB BLD-MCNC: 11.6 G/DL (ref 12–17)
HGB UR QL STRIP.AUTO: NEGATIVE
KETONES UR STRIP-MCNC: NEGATIVE MG/DL
LEUKOCYTE ESTERASE UR QL STRIP: NEGATIVE
MAGNESIUM SERPL-MCNC: 1.9 MG/DL (ref 1.6–2.6)
MCH RBC QN AUTO: 28.6 PG (ref 26.8–34.3)
MCHC RBC AUTO-ENTMCNC: 31.3 G/DL (ref 31.4–37.4)
MCV RBC AUTO: 91 FL (ref 82–98)
NITRITE UR QL STRIP: NEGATIVE
PH UR STRIP.AUTO: 5.5 [PH] (ref 4.5–8)
PLATELET # BLD AUTO: 191 THOUSANDS/UL (ref 149–390)
PMV BLD AUTO: 10.1 FL (ref 8.9–12.7)
POTASSIUM SERPL-SCNC: 4.3 MMOL/L (ref 3.5–5.3)
PROT UR STRIP-MCNC: NEGATIVE MG/DL
QRS AXIS: 63 DEGREES
QRSD INTERVAL: 108 MS
QT INTERVAL: 388 MS
QTC INTERVAL: 458 MS
RBC # BLD AUTO: 4.06 MILLION/UL (ref 3.88–5.62)
SODIUM SERPL-SCNC: 135 MMOL/L (ref 136–145)
SP GR UR STRIP.AUTO: 1.02 (ref 1–1.03)
T WAVE AXIS: -35 DEGREES
URATE SERPL-MCNC: 7.6 MG/DL (ref 4.2–8)
UROBILINOGEN UR QL STRIP.AUTO: 0.2 E.U./DL
VENTRICULAR RATE: 84 BPM
WBC # BLD AUTO: 7.86 THOUSAND/UL (ref 4.31–10.16)

## 2017-10-09 PROCEDURE — 85027 COMPLETE CBC AUTOMATED: CPT | Performed by: FAMILY MEDICINE

## 2017-10-09 PROCEDURE — 84550 ASSAY OF BLOOD/URIC ACID: CPT | Performed by: INTERNAL MEDICINE

## 2017-10-09 PROCEDURE — 83735 ASSAY OF MAGNESIUM: CPT | Performed by: FAMILY MEDICINE

## 2017-10-09 PROCEDURE — 82948 REAGENT STRIP/BLOOD GLUCOSE: CPT

## 2017-10-09 PROCEDURE — 81003 URINALYSIS AUTO W/O SCOPE: CPT | Performed by: EMERGENCY MEDICINE

## 2017-10-09 PROCEDURE — 80048 BASIC METABOLIC PNL TOTAL CA: CPT | Performed by: FAMILY MEDICINE

## 2017-10-09 PROCEDURE — 94760 N-INVAS EAR/PLS OXIMETRY 1: CPT

## 2017-10-09 PROCEDURE — 94640 AIRWAY INHALATION TREATMENT: CPT

## 2017-10-09 RX ORDER — COLCHICINE 0.6 MG/1
0.6 TABLET ORAL 2 TIMES DAILY
Status: DISCONTINUED | OUTPATIENT
Start: 2017-10-09 | End: 2017-10-11 | Stop reason: HOSPADM

## 2017-10-09 RX ORDER — PREDNISONE 20 MG/1
20 TABLET ORAL 2 TIMES DAILY WITH MEALS
Status: DISCONTINUED | OUTPATIENT
Start: 2017-10-09 | End: 2017-10-10

## 2017-10-09 RX ADMIN — INSULIN LISPRO 4 UNITS: 100 INJECTION, SOLUTION INTRAVENOUS; SUBCUTANEOUS at 22:18

## 2017-10-09 RX ADMIN — INSULIN LISPRO 1 UNITS: 100 INJECTION, SOLUTION INTRAVENOUS; SUBCUTANEOUS at 08:24

## 2017-10-09 RX ADMIN — PREDNISONE 20 MG: 20 TABLET ORAL at 17:00

## 2017-10-09 RX ADMIN — FLUTICASONE PROPIONATE AND SALMETEROL 1 PUFF: 50; 250 POWDER RESPIRATORY (INHALATION) at 20:06

## 2017-10-09 RX ADMIN — PREDNISONE 40 MG: 20 TABLET ORAL at 08:23

## 2017-10-09 RX ADMIN — INSULIN DETEMIR 50 UNITS: 100 INJECTION, SOLUTION SUBCUTANEOUS at 17:00

## 2017-10-09 RX ADMIN — FLUTICASONE PROPIONATE AND SALMETEROL 1 PUFF: 50; 250 POWDER RESPIRATORY (INHALATION) at 08:00

## 2017-10-09 RX ADMIN — METOPROLOL TARTRATE 25 MG: 25 TABLET ORAL at 08:23

## 2017-10-09 RX ADMIN — COLCHICINE 0.6 MG: 0.6 TABLET, FILM COATED ORAL at 17:00

## 2017-10-09 RX ADMIN — HEPARIN SODIUM 5000 UNITS: 5000 INJECTION, SOLUTION INTRAVENOUS; SUBCUTANEOUS at 14:25

## 2017-10-09 RX ADMIN — INSULIN DETEMIR 50 UNITS: 100 INJECTION, SOLUTION SUBCUTANEOUS at 08:22

## 2017-10-09 RX ADMIN — INSULIN LISPRO 2 UNITS: 100 INJECTION, SOLUTION INTRAVENOUS; SUBCUTANEOUS at 12:10

## 2017-10-09 RX ADMIN — ALLOPURINOL 300 MG: 300 TABLET ORAL at 08:23

## 2017-10-09 RX ADMIN — HEPARIN SODIUM 5000 UNITS: 5000 INJECTION, SOLUTION INTRAVENOUS; SUBCUTANEOUS at 04:50

## 2017-10-09 RX ADMIN — LOSARTAN POTASSIUM 50 MG: 50 TABLET ORAL at 08:23

## 2017-10-09 RX ADMIN — HEPARIN SODIUM 5000 UNITS: 5000 INJECTION, SOLUTION INTRAVENOUS; SUBCUTANEOUS at 22:19

## 2017-10-09 RX ADMIN — METOPROLOL TARTRATE 25 MG: 25 TABLET ORAL at 17:00

## 2017-10-09 RX ADMIN — INSULIN LISPRO 4 UNITS: 100 INJECTION, SOLUTION INTRAVENOUS; SUBCUTANEOUS at 16:59

## 2017-10-09 RX ADMIN — COLCHICINE 1.2 MG: 0.6 TABLET, FILM COATED ORAL at 08:23

## 2017-10-09 NOTE — CONSULTS
Consultation - Cardiology   Leila Tim Cummins 74 y o  male MRN: [id number]  Unit/Bed#: [address] [place] [address] Encounter: [id number]      Assessment:  Principal Problem:    Swelling of arm  Active Problems:    COPD (chronic obstructive pulmonary disease) ([facility])    DM2 (diabetes mellitus, type 2) ([facility])    Gout    Hypertension    Chronic atrial fibrillation ([facility] )      Plan:  Patient at present time is comfortable  He has no chest pain or significant dyspnea  The reason he is not on anticoagulant was personal choice  He apparently has a prior history of a stomach bleed which may have been related to peptic ulcer disease  He was reluctant to be on anti-platelet or anticoagulant medications  I did tell him that my preference would be that he be on anticoagulant in reference to chronic atrial fibrillation to reduce the risk of stroke  He will reconsider the issue and if he has a difference in his opinion we can certainly initiate Coumadin and monitor his PT INRs as an outpatient  He had been on Coumadin previously and had self discontinued this  Thank you for this consultation and we will continue to monitor the patient in an outpatient setting  History of Present Illness   Physician Requesting Consult: Low Sen MD  Reason for Consult / Principal Problem:  Wrist pain  HPI: Gio Lance is a 74y o  year old male who presents with right wrist pain which have been occurring about two days prior to admission  He presented to the emergency room and has subsequently been admitted and stabilized  In the setting of this it was noticed that he had atrial fibrillation and was concerned about why he was not on anticoagulant therapy  He was also noted on labs to have mild azotemia which has been improving  We are asked to provide advice in reference to further management  His troponin on admission was negative      Consults    Review of Systems:  Review of Systems - Negative except wrist pain    Historical Information   Past Medical History:   Diagnosis Date    [facility]congest[facility] failure) ([facility])     COPD (chronic obstructive pulmonary disease) ([facility] [address] )     [facility] mellitus ([email or web address] )     Hypertension      Past Surgical History:   Procedure Laterality Date    TENDON REPAIR       History   Alcohol Use No     History   Drug Use No     History   Smoking Status    Never Smoker   Smokeless Tobacco    Never Used     Family History: non-contributory    Meds/Allergies   all current active meds have been reviewed  Allergies   Allergen Reactions    Latex Rash       Objective   Vitals: Blood pressure 144/69, pulse 78, temperature 98 2 °F (36 8 °C), temperature source Oral, resp  rate 18, height 6' 3" (1 905 m), weight (!) 160 kg (353 lb 6 4 oz), SpO2 94 %  , Body mass index is 44 17 kg/m² , Orthostatic Blood Pressures    Flowsheet Row Most Recent Value   Blood Pressure  144/69 filed at 10/09/2017 0748   Patient Position - Orthostatic VS  Lying filed at 10/09/2017 0748            Intake/Output Summary (Last 24 hours) at 10/09/17 1229  Last data filed at 10/09/17 1001   Gross per 24 hour   Intake              240 ml   Output             1650 ml   Net            -1410 ml       Invasive Devices     Peripheral Intravenous Line            Peripheral IV 10/08/17 Left Antecubital 1 day                Physical Exam   Constitutional: He appears well-developed and well-nourished  Eyes: Conjunctivae are normal    Neck: Normal range of motion  Cardiovascular: Normal rate and normal heart sounds  Pulmonary/Chest: Effort normal and breath sounds normal    Musculoskeletal: He exhibits edema  Skin: Skin is warm and dry         Lab Results:   Admission on 10/08/2017   Component Date Value    WBC 10/08/2017 8 88     RBC 10/08/2017 4 56     Hemoglobin 10/08/2017 13 0     Hematocrit 10/08/2017 41 2     MCV 10/08/2017 90     MCH 10/08/2017 28 5     MCHC 10/08/2017 31 6     RDW 10/08/2017 14 1     MPV 10/08/2017 10 2     Platelets 10/08/2017 228     Neutrophils Relative 10/08/2017 73     Lymphocytes Relative 10/08/2017 14     Monocytes Relative 10/08/2017 12     Eosinophils Relative 10/08/2017 1     Basophils Relative 10/08/2017 0     Neutrophils Absolute 10/08/2017 6 58     Lymphocytes Absolute 10/08/2017 1 21     Monocytes Absolute 10/08/2017 1 03     Eosinophils Absolute 10/08/2017 0 05     Basophils Absolute 10/08/2017 0 01     Sodium 10/08/2017 134*    Potassium 10/08/2017 4 5     Chloride 10/08/2017 98*    CO2 10/08/2017 26     Anion Gap 10/08/2017 10     BUN 10/08/2017 30*    Creatinine 10/08/2017 1 45*    Glucose 10/08/2017 259*    Calcium 10/08/2017 8 5     AST 10/08/2017 15     ALT 10/08/2017 26     Alkaline Phosphatase 10/08/2017 178*    Total Protein 10/08/2017 8 3*    Albumin 10/08/2017 3 2*    Total Bilirubin 10/08/2017 0 90     eGFR 10/08/2017 50     LACTIC ACID 10/08/2017 1 4     Color, UA 10/08/2017      Clarity, UA 10/08/2017      Specific [place], UA 10/08/2017      pH, UA 10/08/2017      Leukocytes, UA 10/08/2017      Nitrite, UA 10/08/2017      Protein, UA 10/08/2017      Glucose, UA 10/08/2017      Ketones, UA 10/08/2017      Urobilinogen, UA 10/08/2017      Bilirubin, UA 10/08/2017      Blood, UA 10/08/2017      Protime 10/08/2017 14 3     INR 10/08/2017 1 13     PTT 10/08/2017 38*    NT-proBNP 10/08/2017 1606*    Troponin I 10/08/2017 <0 02     Total CK 10/08/2017 55     Color, UA 10/08/2017 Yellow     Clarity, UA 10/08/2017 Clear     Specific Gravity, UA 10/08/2017 1 025     pH, UA 10/08/2017 5 5     Leukocytes, UA 10/08/2017 Negative     Nitrite, UA 10/08/2017 Negative     Protein, UA 10/08/2017 Negative     Glucose, UA 10/08/2017 Negative     Ketones, UA 10/08/2017 Negative     Urobilinogen, UA 10/08/2017 0 2     Bilirubin, UA 10/08/2017 Negative     Blood, UA 10/08/2017 Trace-Intact*    Color, UA 10/09/2017 Yellow     Clarity, UA 10/09/2017 Clear     Specific Gravity, UA 10/09/2017 1 025     pH, UA 10/09/2017 5 5     Leukocytes, UA 10/09/2017 Negative     Nitrite, UA 10/09/2017 Negative     Protein, UA 10/09/2017 Negative     Glucose, UA 10/09/2017 Negative     Ketones, UA 10/09/2017 Negative     Urobilinogen, UA 10/09/2017 0 2     Bilirubin, UA 10/09/2017 Negative     Blood, UA 10/09/2017 Negative     RBC, UA 10/08/2017 None Seen     WBC, UA 10/08/2017 None Seen     Epithelial Cells 10/08/2017 None Seen     Bacteria, UA 10/08/2017 None Seen     POC Glucose 10/08/2017 216*    POC Glucose 10/08/2017 294*    Sodium 10/09/2017 135*    Potassium 10/09/2017 4 3     Chloride 10/09/2017 102     CO2 10/09/2017 27     Anion Gap 10/09/2017 6     BUN 10/09/2017 29*    Creatinine 10/09/2017 1 10     Glucose 10/09/2017 202*    Calcium 10/09/2017 8 3     eGFR 10/09/2017 70     Magnesium 10/09/2017 1 9     WBC 10/09/2017 7 86     RBC 10/09/2017 4 06     Hemoglobin 10/09/2017 11 6*    Hematocrit 10/09/2017 37 1     MCV 10/09/2017 91     MCH 10/09/2017 28 6     MCHC 10/09/2017 31 3*    RDW 10/09/2017 13 9     Platelets 16/41/3340 191     MPV 10/09/2017 10 1     POC Glucose 10/09/2017 189*    Uric Acid 10/09/2017 7 6     POC Glucose 10/09/2017 258*       Imaging: I have personally reviewed pertinent reports  Xr Chest 2 Views    Result Date: 10/8/2017  Narrative: CHEST 2 View INDICATION:  Chest pain  Arm infection  COMPARISON:  12/21/2015 VIEWS:  PA and lateral projections; 3 images FINDINGS:     Heart shadow is enlarged but stable from prior exam Persistent bibasilar patchy atelectasis  Visualized osseous structures appear within normal limits for the patient's age  Impression: Stable cardiomegaly  Patchy bibasilar atelectasis  Workstation performed: [id number]     Xr Forearm 2 Views Right    Result Date: 10/8/2017  Narrative: RIGHT FOREARM INDICATION:  Swelling right hand and forearm for 2 days  No trauma   COMPARISON: None VIEWS: 2 IMAGES:  4 FINDINGS: There is no acute fracture or dislocation  No degenerative changes  No lytic or blastic lesions are seen  Soft tissues are unremarkable  Impression: No acute osseous abnormality  Workstation performed: [id number]     Xr Hand 3+ Views Right    Result Date: 10/8/2017  Narrative: RIGHT HAND INDICATION:  Pain and swelling right hand and right forearm for 2 days  COMPARISON: None VIEWS:  3 IMAGES:  3 For the purposes of institution wide universal language the following terms will apply: (thumb=1st digit/finger, index finger=2nd digit/finger, long finger=3rd digit/finger, ring=4th digit/finger and small finger=5th digit/finger) FINDINGS: There is no acute fracture or dislocation  No degenerative changes  No lytic or blastic lesions are seen  Soft tissue swelling dorsal hand and wrist      Impression: No acute osseous abnormality  Dorsal soft tissue swelling hand and wrist  Workstation performed: [id number]     Vas Upper Limb Venous Duplex Scan, Unilateral/limited    Result Date: 10/9/2017  Narrative:  [facility] REPORT CLINICAL: Indications:  Patient presents with right upper extremity pain and swelling x 2 days  Operative History: 2015-07-06 Agram-wnl HTN,IDDM,chronic left GSV chronic superficial phlebitis   FINDINGS:  Right         Impression       Int  Jugular  Normal (Patent)     CONCLUSION: Impression RIGHT UPPER LIMB: NORMAL No evidence of acute or chronic deep vein thrombosis  Ulnar vein only visualized in the very proximal region due to patient being unable to move his arm  No evidence of superficial thrombophlebitis noted  Basilic vein not visualized in the forearm due to patient being unable to move his arm  Doppler evaluation shows a normal response to augmentation maneuvers  LEFT UPPER LIMB LIMITED: NORMAL Evaluation shows no evidence of thrombus in the internal jugular vein, subclavian vein, and the brachiocephalic vein    SIGNATURE: Electronically Signed by: Ashkan Edwards [facility], MD, [address] on 2017-10-09 07:10:27 AM      EKG: Personally reviewed by Yoel Edwards MD     Counseling / Coordination of Care  Total floor / unit time spent today 30 minutes  Greater than 50% of total time was spent with the patient and / or family counseling and / or coordination of care

## 2017-10-09 NOTE — PROGRESS NOTES
Progress Note - Mamta Cummins 74 y o  male MRN: [id number]    Unit/Bed#: [address] [place] [address] Encounter: [id number]      Assessment:    Principal Problem:    Swelling of arm  Active Problems:    COPD (chronic obstructive pulmonary disease) ([facility])    DM2 (diabetes mellitus, type 2) ([facility])    Gout    Hypertension    Chronic atrial fibrillation ([address] )  Resolved Problems:    * No resolved hospital problems  *      Plan:  Continue treatment for what appears to be obviously acute gout  Review of his outpatient records reveal he is followed for chronic atrial fibrillation  He is not on anticoagulation, will get cardiology opinion as to whether not the patient should be on anticoagulation, or the reason why he is not currently  Subjective:   Patient still complains of pain in the right arm  Objective:     Vitals: Blood pressure 144/69, pulse 78, temperature 98 2 °F (36 8 °C), temperature source Oral, resp  rate 18, height 6' 3" (1 905 m), weight (!) 160 kg (353 lb 6 4 oz), SpO2 94 %  ,Body mass index is 44 17 kg/m²  Intake/Output Summary (Last 24 hours) at 10/09/17 0905  Last data filed at 10/09/17 0501   Gross per 24 hour   Intake              240 ml   Output             1350 ml   Net            -1110 ml       Physical Exam:    Alert and awake in no acute distress  Head normocephalic, oral membranes are moist   Neck supple, no lymphadenopathy  Lungs clear to auscultation bilaterally  Heart AFib  Abdomen soft, active bowel sounds, non-tender, non-distended obese  Extremities:  Chronic venous insufficiency right arm with significant tenderness of the right wrist to some degree the elbow  Skin:  Venous stasis changes        Invasive Devices     Peripheral Intravenous Line            Peripheral IV 10/08/17 Left Antecubital less than 1 day                            Lab, Imaging and other studies: I have personally reviewed pertinent reports         Results from last 7 days  Lab Units 10/09/17  0443 10/08/17  1111 WBC Thousand/uL 7 86 8 88   HEMOGLOBIN g/dL 11 6* 13 0   HEMATOCRIT % 37 1 41 2   PLATELETS Thousands/uL 191 228   NEUTROS PCT %  --  73   LYMPHS PCT %  --  14   MONOS PCT %  --  12   EOS PCT %  --  1       Results from last 7 days  Lab Units 10/09/17  0443 10/08/17  1111   SODIUM mmol/L 135* 134*   POTASSIUM mmol/L 4 3 4 5   CHLORIDE mmol/L 102 98*   CO2 mmol/L 27 26   BUN mg/dL 29* 30*   CREATININE mg/dL 1 10 1 45*   CALCIUM mg/dL 8 3 8 5   TOTAL PROTEIN g/dL  --  8 3*   BILIRUBIN TOTAL mg/dL  --  0 90   ALK PHOS U/L  --  178*   ALT U/L  --  26   AST U/L  --  15   GLUCOSE RANDOM mg/dL 202* 259*     Lab Results   Component Value Date    TROPONINI <0 02 10/08/2017    CKTOTAL 55 10/08/2017       Results from last 7 days  Lab Units 10/08/17  1111   INR  1 13     Lab Results   Component Value Date    BLOODCX No Growth After 5 Days  09/15/2016    BLOODCX No Growth After 5 Days  09/15/2016    WOUNDCULT 4+ Growth of Staphylococcus aureus 09/15/2016    WOUNDCULT 4+ Growth of Proteus mirabilis 09/15/2016    WOUNDCULT 4+ Growth of Mixed Skin Nini 09/15/2016       Imaging:  Results for orders placed in visit on 07/01/15   XR chest portable    Narrative CHEST    INDICATION-  Diffuse pain in left foot  Generalized weakness  COMPARISON-  April 6, 2015   VIEWS-   AP frontal     1 image   FINDINGS-        Cardiomediastinal silhouette appears unremarkable  Mild pulmonary vasculature congestion noted  There is obscuration of   the left hemidiaphragm potentially effusion or atelectasis  Pneumonia   to be considered in the right clinical setting  Visualized osseous structures appear within normal limits for the   patient's age  IMPRESSION-   Obscuration left hemidiaphragm may represent effusion, atelectasis, or   pneumonia  Mild pulmonary vascular congestion     Transcribed on- [id number]           [facility], [facility] Radiologist- Adina Aase, RAD DO        Electronically Signed- [address], RAD DO        Released Date Time- 07/01/15 1814      ------------------------------------------------------------------------------   [address]       Results for orders placed during the hospital encounter of 10/08/17   XR chest 2 views    Narrative CHEST 2 View    INDICATION:  Chest pain  Arm infection  COMPARISON:  12/21/2015    VIEWS:  PA and lateral projections; 3 images    FINDINGS:         Heart shadow is enlarged but stable from prior exam    Persistent bibasilar patchy atelectasis  Visualized osseous structures appear within normal limits for the patient's age  Impression Stable cardiomegaly  Patchy bibasilar atelectasis  Workstation performed: [id number]         PATIENT CENTERED ROUNDS: I have performed rounds with the nursing staff  This note has been constructed using a voice recognition system      Iris Clarke MD

## 2017-10-09 NOTE — SOCIAL WORK
Met with patient  Explained role of care management  Patient lives in a two story home with his sister  Has first floor set up  Ramp to enter  He is independent adl's - states he has been having a hard time lately, ambulates without an assistive device, drives, eats out a lot  DME w/c, hospital bed, RW  Past services - [facility], [facility] and [place]  Patient feels that he would benefit from SNF  Given freedom of choice  [facility]  Await PT recommendation and bed availability

## 2017-10-09 NOTE — CASE MANAGEMENT
Initial Clinical Review    Admission: Date/Time/Statement: 10/8/17 @ 1336     Orders Placed This Encounter   Procedures    Inpatient Admission (expected length of stay for this patient is greater than two midnights)     Standing Status:   Standing     Number of Occurrences:   1     Order Specific Question:   Admitting Physician     Answer:   Teresita Mccall     Order Specific Question:   Level of Care     Answer:   Med Surg [16]     Order Specific Question:   Estimated length of stay     Answer:   More than 2 Midnights     Order Specific Question:   Certification     Answer:   I certify that inpatient services are medically necessary for this patient for a duration of greater than two midnights  See H&P and MD Progress Notes for additional information about the patient's course of treatment  ED: Date/Time/Mode of Arrival:   ED Arrival Information     Expected Arrival Acuity Means of Arrival Escorted By Service Admission Type    - 10/8/2017 10:33 Urgent Walk-In Family Member General Medicine Urgent    Arrival Complaint    Swelling Arm          Chief Complaint:   Chief Complaint   Patient presents with    Arm Swelling     Patient states he has had swelling in his R hand and wrist since Friday  Pt states the pain radiates up into his arm and into his chest on the R       History of Illness: 19-year-old male with past medical history significant for gout, type 2 diabetes mellitus, essential hypertension who presents today with a chief complaint of right wrist pain that began on Thursday after he ate saurkraut and sausage  He states he's had difficulty moving his hand over the past 24 hours, it became more swollen over the past 2 days and more painful   States that this is his usual spot where he gets gout flares     ED Vital Signs:   ED Triage Vitals   Temperature Pulse Respirations Blood Pressure SpO2   10/08/17 1046 10/08/17 1046 10/08/17 1046 10/08/17 1046 10/08/17 1046   99 1 °F (37 3 °C) 81 20 161/75 95 %      Temp Source Heart Rate Source Patient Position - Orthostatic VS BP Location FiO2 (%)   10/09/17 0300 10/08/17 1145 10/08/17 1145 10/08/17 1145 --   Oral Monitor Lying Left arm       Pain Score       10/08/17 1046       Worst Possible Pain        Wt Readings from Last 1 Encounters:   10/09/17 (!) 160 kg (353 lb 6 4 oz)       Vital Signs (abnormal):  Respiratory rate 25 - 26  Maximum /79  Room air sat 88%    Exam - obese  Lungs crackles at bases  Pain in right arm from shoulder joint down  Decreased ROM  Edema  Bilateral lower extremities with signs of vascular deficiency    Abnormal Labs/Diagnostic Test Results:   UA trace blood  NT-proBNP 1606  Na 134  Cl 98  Bun 30  Creatinine 1 45  Glucose 259  Alkaline phosphatase 178  Total protein 8 3  Albumin 3 2  CxR- stable cardiomegaly  Patchy bibasilar atelectasis  Xray right hand - No acute osseous abnormality  Dorsal soft tissue swelling hand and wrist     Serial glucose 216; 294    Labs am 10/9- glucose 189  Na 135  Bun 29     hgb 11 6        ED Treatment:  Oxygen at 2 liters  Blood cultures  Medication Administration from 10/08/2017 1033 to 10/08/2017 1548       Date/Time Order Dose Route Action Action by Comments     10/08/2017 1252 sodium chloride 0 9 % bolus 500 mL 0 mL Intravenous Stopped Katty Tony RN      10/08/2017 1252 sodium chloride 0 9 % bolus 500 mL 500 mL Intravenous New Bag Katty Tony RN      10/08/2017 1147 morphine (PF) 4 mg/mL injection 4 mg 4 mg Intravenous Given Katty Tony RN      10/08/2017 1254 predniSONE tablet 40 mg 40 mg Oral Given Sue Ledesma RN      10/08/2017 1336 morphine (PF) 4 mg/mL injection 4 mg 4 mg Intravenous Given Sue Ledesma RN           Past Medical/Surgical History:    Active Ambulatory Problems     Diagnosis Date Noted    [facility] 09/15/2016    COPD (chronic obstructive pulmonary disease) [facility] 09/15/2016    CHF (congestive heart failure) ([place] [address] ) 09/15/2016    DM2 (diabetes mellitus, type 2) ([place] [address] ) 09/15/2016    Gout 09/15/2016    PAD (peripheral artery disease) ([place] [address] ) 09/15/2016    Hypertension 09/15/2016    Diarrhea 09/17/2016     Resolved Ambulatory Problems     Diagnosis Date Noted    No Resolved Ambulatory Problems     Past Medical History:   Diagnosis Date    [facility]     COPD (chronic obstructive pulmonary disease) ([place] [address] )     Diabetes mellitus ([place] [address] )     Hypertension        Admitting Diagnosis: Swelling of limb [M79 89]  Swelling of arm [M79 89]  Gout flare [M10 9]    Age/Sex: 74 y o  male    Assessment/Plan: Acute gout Exacerbation  Continue allopurinol  Initiate Colchrys 1 2 mg p  o  daily  Initiate prednisone 40 mg p o   Daily  PT/OT     Acute kidney injury:    Unclear at this is patient's new baseline, will give 1 L normal saline and check renal function in the morning  We will hold Lasix 40 mg b i d  X 24 hours     Type 2 diabetes mellitus with hyperglycemia  Continue Levemir  Accu-Cheks q a c  and HS, with insulin sliding scale     Essential hypertension  Continue Cozaar and metoprolol  Hold Lasix     LOS > 2 midnights  Full Code  Heparin for DVT Prophy       Admission Orders:  10/8/2017  1336 INPATIENT   Scheduled Meds:   allopurinol 300 mg Oral Daily   colchicine 0 6 mg Oral BID   fluticasone-salmeterol 1 puff Inhalation Q12H [address]   heparin (porcine) 5,000 Units Subcutaneous Q8H [address]   insulin detemir 50 Units Subcutaneous BID   insulin lispro 1-5 Units Subcutaneous 4x Daily (AC & HS)   losartan 50 mg Oral Daily   metoprolol tartrate 25 mg Oral BID   predniSONE 20 mg Oral BID With Meals     Continuous Infusions:    PRN Meds:   albuterol    HYDROmorphone  0 5 iv - used x 1      influenza vaccine    OTHER ORDERS:  Fingerstick glucose qid    scds  Consult cardiology  OT/PT

## 2017-10-10 LAB
ANION GAP SERPL CALCULATED.3IONS-SCNC: 6 MMOL/L (ref 4–13)
BUN SERPL-MCNC: 36 MG/DL (ref 5–25)
CALCIUM SERPL-MCNC: 8.5 MG/DL (ref 8.3–10.1)
CHLORIDE SERPL-SCNC: 102 MMOL/L (ref 100–108)
CO2 SERPL-SCNC: 26 MMOL/L (ref 21–32)
CREAT SERPL-MCNC: 1.14 MG/DL (ref 0.6–1.3)
GFR SERPL CREATININE-BSD FRML MDRD: 67 ML/MIN/1.73SQ M
GLUCOSE SERPL-MCNC: 232 MG/DL (ref 65–140)
GLUCOSE SERPL-MCNC: 256 MG/DL (ref 65–140)
GLUCOSE SERPL-MCNC: 259 MG/DL (ref 65–140)
GLUCOSE SERPL-MCNC: 260 MG/DL (ref 65–140)
GLUCOSE SERPL-MCNC: 270 MG/DL (ref 65–140)
GLUCOSE SERPL-MCNC: 311 MG/DL (ref 65–140)
POTASSIUM SERPL-SCNC: 4.6 MMOL/L (ref 3.5–5.3)
SODIUM SERPL-SCNC: 134 MMOL/L (ref 136–145)

## 2017-10-10 PROCEDURE — 94640 AIRWAY INHALATION TREATMENT: CPT

## 2017-10-10 PROCEDURE — G8978 MOBILITY CURRENT STATUS: HCPCS

## 2017-10-10 PROCEDURE — 97165 OT EVAL LOW COMPLEX 30 MIN: CPT

## 2017-10-10 PROCEDURE — 80048 BASIC METABOLIC PNL TOTAL CA: CPT | Performed by: INTERNAL MEDICINE

## 2017-10-10 PROCEDURE — 82948 REAGENT STRIP/BLOOD GLUCOSE: CPT

## 2017-10-10 PROCEDURE — G8988 SELF CARE GOAL STATUS: HCPCS

## 2017-10-10 PROCEDURE — G8979 MOBILITY GOAL STATUS: HCPCS

## 2017-10-10 PROCEDURE — 94760 N-INVAS EAR/PLS OXIMETRY 1: CPT

## 2017-10-10 PROCEDURE — G8987 SELF CARE CURRENT STATUS: HCPCS

## 2017-10-10 PROCEDURE — 97163 PT EVAL HIGH COMPLEX 45 MIN: CPT

## 2017-10-10 RX ORDER — FUROSEMIDE 80 MG
80 TABLET ORAL DAILY
Status: DISCONTINUED | OUTPATIENT
Start: 2017-10-10 | End: 2017-10-11 | Stop reason: HOSPADM

## 2017-10-10 RX ORDER — COLCHICINE 0.6 MG/1
0.6 TABLET ORAL 2 TIMES DAILY
Qty: 20 TABLET | Refills: 0 | Status: CANCELLED | OUTPATIENT
Start: 2017-10-10 | End: 2017-10-13

## 2017-10-10 RX ORDER — COLCHICINE 0.6 MG/1
0.6 TABLET ORAL 2 TIMES DAILY
Qty: 20 TABLET | Refills: 0 | Status: SHIPPED | OUTPATIENT
Start: 2017-10-10 | End: 2017-10-11 | Stop reason: HOSPADM

## 2017-10-10 RX ORDER — COLCHICINE 0.6 MG/1
0.6 TABLET ORAL 2 TIMES DAILY
Qty: 30 TABLET | Refills: 0 | Status: CANCELLED | OUTPATIENT
Start: 2017-10-10 | End: 2017-10-13

## 2017-10-10 RX ADMIN — HEPARIN SODIUM 5000 UNITS: 5000 INJECTION, SOLUTION INTRAVENOUS; SUBCUTANEOUS at 14:46

## 2017-10-10 RX ADMIN — INSULIN LISPRO 3 UNITS: 100 INJECTION, SOLUTION INTRAVENOUS; SUBCUTANEOUS at 21:40

## 2017-10-10 RX ADMIN — HEPARIN SODIUM 5000 UNITS: 5000 INJECTION, SOLUTION INTRAVENOUS; SUBCUTANEOUS at 05:04

## 2017-10-10 RX ADMIN — FLUTICASONE PROPIONATE AND SALMETEROL 1 PUFF: 50; 250 POWDER RESPIRATORY (INHALATION) at 08:55

## 2017-10-10 RX ADMIN — FLUTICASONE PROPIONATE AND SALMETEROL 1 PUFF: 50; 250 POWDER RESPIRATORY (INHALATION) at 20:18

## 2017-10-10 RX ADMIN — COLCHICINE 0.6 MG: 0.6 TABLET, FILM COATED ORAL at 09:57

## 2017-10-10 RX ADMIN — FUROSEMIDE 80 MG: 80 TABLET ORAL at 12:29

## 2017-10-10 RX ADMIN — LOSARTAN POTASSIUM 50 MG: 50 TABLET ORAL at 09:57

## 2017-10-10 RX ADMIN — INSULIN LISPRO 2 UNITS: 100 INJECTION, SOLUTION INTRAVENOUS; SUBCUTANEOUS at 16:56

## 2017-10-10 RX ADMIN — INSULIN DETEMIR 50 UNITS: 100 INJECTION, SOLUTION SUBCUTANEOUS at 17:00

## 2017-10-10 RX ADMIN — ALLOPURINOL 300 MG: 300 TABLET ORAL at 09:57

## 2017-10-10 RX ADMIN — INSULIN LISPRO 3 UNITS: 100 INJECTION, SOLUTION INTRAVENOUS; SUBCUTANEOUS at 12:30

## 2017-10-10 RX ADMIN — METOPROLOL TARTRATE 25 MG: 25 TABLET ORAL at 17:00

## 2017-10-10 RX ADMIN — INSULIN LISPRO 2 UNITS: 100 INJECTION, SOLUTION INTRAVENOUS; SUBCUTANEOUS at 10:25

## 2017-10-10 RX ADMIN — METOPROLOL TARTRATE 25 MG: 25 TABLET ORAL at 09:57

## 2017-10-10 RX ADMIN — INSULIN LISPRO 10 UNITS: 100 INJECTION, SOLUTION INTRAVENOUS; SUBCUTANEOUS at 12:31

## 2017-10-10 RX ADMIN — PREDNISONE 20 MG: 20 TABLET ORAL at 09:57

## 2017-10-10 RX ADMIN — INSULIN DETEMIR 50 UNITS: 100 INJECTION, SOLUTION SUBCUTANEOUS at 09:55

## 2017-10-10 RX ADMIN — COLCHICINE 0.6 MG: 0.6 TABLET, FILM COATED ORAL at 17:00

## 2017-10-10 RX ADMIN — HEPARIN SODIUM 5000 UNITS: 5000 INJECTION, SOLUTION INTRAVENOUS; SUBCUTANEOUS at 21:40

## 2017-10-10 NOTE — PROGRESS NOTES
Pt observed to be sleeping during most hrly rounds overnight; denies pain  Using urinal to void ad bud qs

## 2017-10-10 NOTE — OCCUPATIONAL THERAPY NOTE
[address] Evaluation     Patient Name: Cat Artpablo ORTIZ'S Date: 10/10/2017  Problem List  Patient Active Problem List   Diagnosis    Diabetic foot ulcer ([facility] )    COPD (chronic obstructive pulmonary disease) ([place] [address] )    CHF (congestive heart failure) ([facility] )    DM2 (diabetes mellitus, type 2) ([facility])    Gout    PAD (peripheral artery disease) ([facility])    Hypertension    Diarrhea    Swelling of arm    Chronic atrial fibrillation ([facility])    Morbid obesity ([place] [address] )    Acute gout     Past Medical History  Past Medical History:   Diagnosis Date    CHF [facility]congestive heart failure) ([facility])     COPD (chronic obstructive pulmonary disease) ([facility] )     Diabetes mellitus ([place] [address] )     Hypertension      Past Surgical History  Past Surgical History:   Procedure Laterality Date    TENDON REPAIR             10/10/17 1040   Note Type   Note type Eval only   Restrictions/Precautions   Weight Bearing Precautions Per Order No   Other Precautions (recent d/c wound care for L foot ulcer  to get diabetic shoe)   Pain Assessment   Pain Assessment 0-10   Montalvo-Baker FACES Pain Rating 6   Pain Type Acute pain   Pain Location Elbow;Hand;Wrist   Pain Orientation Right   Home Living   Type of Home House   Home Layout Able to live on main level with bedroom/bathroom; Bed/bath upstairs; Two level   [address]; Wheelchair-manual   Prior Function   Level of [place] Needs assistance with ADLs and functional mobility   Lives With Alone   Receives Help From Friend(s)   ADL Assistance Needs assistance   IADLs Needs assistance   Falls in the last 6 months 0   Vocational Retired   Comments A for [employer] cares pta  easily sob   Subjective   Subjective " I just can't do it with this R hand"   ADL   LB Dressing Assistance 2  Maximal Assistance   LB Dressing Deficit Setup; Thread RLE into underwear; Thread LLE into underwear;Don/doff R sock; Don/doff L sock; Don/doff R shoe;Don/doff L shoe  (decreased use of RUE )   Bed Mobility   Supine to Sit 6  Modified independent   Additional items HOB elevated; Bedrails   Transfers   Sit to Stand 4  Minimal assistance   Additional items Increased time required  (decreased use of RUE)   Stand to Sit 4  Minimal assistance   Stand pivot 4  Minimal assistance   Additional items Increased time required  (use of spc LUE)   Balance   Static Sitting Normal   Dynamic Sitting Good   Static Standing Good   Dynamic Standing Fair -   Ambulatory Fair -   Activity Tolerance   Activity Tolerance Patient limited by pain   Nurse Made Aware yes   RUE Assessment   RUE Assessment (edema R hand, pain R elbow flexion, wrist ,hand)   Edema   RUE Edema Non-pitting   LUE Assessment   LUE Assessment WFL   Hand Function   Gross Motor Coordination Impaired   Fine Motor Coordination Impaired  (opposes to ring)   Cognition   Overall Cognitive Status WFL   Arousal/Participation Cooperative   Attention Within functional limits   Orientation Level Oriented X4   Memory Within functional limits   Following Commands Follows all commands and directions without difficulty   Comments pt reproting difficutly managing a jyoti since 2 yrs of L foot wound care  He ihas required A w LB adls and now has RUE pain, edema   Assessment   Limitation Decreased ADL status; Decreased UE ROM; Decreased UE strength;Decreased endurance;Decreased fine motor control   Prognosis Good   Assessment this 74 yom presents  RUE gout, edema and dysfunction for all adls and functional tasks  He is obese and deconditioned s/p 2 yrs of healing L foot ulcer  Pt will benefit form inpt rehab to maximize funcotnal status and provide w HEP    Goals   Patient Goals get better   LTG Time Frame 7-10   Long Term Goal #1 bed mob mi, stand cristhian 3-5 min w f/f+ balance for adls and fucntonal tasks, inc RUE  FM and strength/funciotn for adls and functional tasks, adls to mi w AE prn   Plan   Treatment Interventions ADL retraining;UE strengthening/ROM; Endurance training;Patient/family training;Neuromuscular reeducation; Fine motor coordination activities   Goal Expiration Date 10/20/17   OT Frequency 3-5x/wk   Recommendation   OT Discharge Recommendation Short Term Rehab   Barthel Index   Feeding 5   Bathing 0   Grooming Score 0   Dressing Score 5   Bladder Score 10   Bowels Score 10   Toilet Use Score 5   Transfers (Bed/Chair) Score 10   Mobility (Level Surface) Score 0   Stairs Score 0   Barthel Index Score 45   Florence Riedel, OT

## 2017-10-10 NOTE — PLAN OF CARE
Problem: PHYSICAL THERAPY ADULT  Goal: Performs mobility at highest level of function for planned discharge setting  See evaluation for individualized goals  Treatment/Interventions: ADL retraining, Functional transfer training, LE strengthening/ROM, Therapeutic exercise, Endurance training, Patient/family training, Equipment eval/education, Gait training, Spoke to case management, Spoke to nursing, Spoke to MD, OT  Equipment Recommended: Gabrielle Pete       See flowsheet documentation for full assessment, interventions and recommendations  Outcome: Progressing  Prognosis: Good  Problem List: Decreased strength, Decreased endurance, Impaired balance, Decreased mobility, Decreased coordination, Pain  Assessment: Pt presetns with impaired strength adn mobility after adm for gout flare up R ue   Difficulty using assistive device due to R hand pain and swelling  Difficulty with sit-stand due to prox le weakness  Pt will benefit from skilled TP services ot regain safe ind funcitonal mobility  REcommend shor tterm in-pt rehab atd/c  Will follow see goals  Barriers to Discharge: Decreased caregiver support     Recommendation: Short-term skilled PT          See flowsheet documentation for full assessment

## 2017-10-10 NOTE — PHYSICIAN ADVISOR
Current patient class: Inpatient  The patient is currently on Hospital Day: 2      The patient was admitted to the hospital at 1336 on 10/8/17 for the following diagnosis:  Swelling of limb [M79 89]  Swelling of arm [M79 89]  Gout flare [M10 9]       There is documentation in the medical record of an expected length of stay of at least 2 midnights  The patient is therefore expected to satisfy the 2 midnight benchmark and given the 2 midnight presumption is appropriate for INPATIENT ADMISSION  Given this expectation of a satisfying stay, CMS instructs us that the patient is most often appropriate for inpatient admission under part A provided medical necessity is documented in the chart  After review of the relevant documentation, labs, vital signs and test results, the patient is appropriate for INPATIENT ADMISSION  Admission to the hospital as an inpatient is a complex decision making process which requires the practitioner to consider the patients presenting complaint, history and physical examination and all relevant testing  With this in mind, in this case, the patient was deemed appropriate for INPATIENT ADMISSION  After review of the documentation and testing available at the time of the admission I concur with this clinical determination of medical necessity  Rationale is as follows: The patient is a 74 yrs old Male who presented to the ED at 10/8/2017 10:45 AM with a chief complaint of Arm Swelling (Patient states he has had swelling in his R hand and wrist since Friday    Pt states the pain radiates up into his arm and into his chest on the R)    The patients vitals on arrival were ED Triage Vitals   Temperature Pulse Respirations Blood Pressure SpO2   10/08/17 1046 10/08/17 1046 10/08/17 1046 10/08/17 1046 10/08/17 1046   99 1 °F (37 3 °C) 81 20 161/75 95 %      Temp Source Heart Rate Source Patient Position - Orthostatic VS BP Location FiO2 (%)   10/09/17 0300 10/08/17 1145 10/08/17 1145 10/08/17 1145 --   Oral Monitor Lying Left arm       Pain Score       10/08/17 1046       Worst Possible Pain           Past Medical History:   Diagnosis Date    [facility]     [facility]     Diabetes mellitus ([facility] )     Hypertension      Past Surgical History:   Procedure Laterality Date    TENDON REPAIR             Consults have been placed to:   IP CONSULT TO CARDIOLOGY    Vitals:    10/09/17 0748 10/09/17 0753 10/09/17 1608 10/09/17 2008   BP: 144/69  132/73    Pulse: 78  78    Resp: 18  18    Temp: 98 2 °F (36 8 °C)  98 5 °F (36 9 °C)    TempSrc: Oral  Oral    SpO2: 95% 94% 92% 92%   Weight: (!) 160 kg (353 lb 6 4 oz)      Height:           Most recent labs:    Recent Labs      10/08/17   1111  10/09/17   0443   WBC  8 88  7 86   HGB  13 0  11 6*   HCT  41 2  37 1   PLT  228  191   K  4 5  4 3   NA  134*  135*   CALCIUM  8 5  8 3   BUN  30*  29*   CREATININE  1 45*  1 10   INR  1 13   --    TROPONINI  <0 02   --    CKTOTAL  55   --    AST  15   --    ALT  26   --    ALKPHOS  178*   --    BILITOT  0 90   --        Scheduled Meds:  allopurinol 300 mg Oral Daily   colchicine 0 6 mg Oral BID   fluticasone-salmeterol 1 puff Inhalation Q12H [address]   heparin (porcine) 5,000 Units Subcutaneous Q8H [address]   insulin detemir 50 Units Subcutaneous BID   insulin lispro 1-5 Units Subcutaneous 4x Daily (AC & HS)   losartan 50 mg Oral Daily   metoprolol tartrate 25 mg Oral BID   predniSONE 20 mg Oral BID With Meals     Continuous Infusions:   PRN Meds:   albuterol    HYDROmorphone    influenza vaccine    Surgical procedures (if appropriate):

## 2017-10-10 NOTE — PROGRESS NOTES
Progress Note - Kvng Cummins 74 y o  male MRN: [id number]    Unit/Bed#: [address] [place] [address] 207-01 Encounter: [id number]      Assessment:    Principal Problem:    Swelling of arm  Active Problems:    COPD (chronic obstructive pulmonary disease) (HCC)    DM2 (diabetes mellitus, type 2) (HCC)    Gout    Hypertension    Chronic atrial fibrillation (HCC)    Morbid obesity ([address] )    Acute gout  Resolved Problems:    * No resolved hospital problems  *      Plan:  Patient claims he needs to go to rehab  The size the gout of his hand, the patient is obese and deconditioned  Will need to wait another day for that to happen  In the meantime will workup his sugars as the prednisone has raise them  Restart the diuretics that were not order admission  Subjective:   Hand is better  Objective:     Vitals: Blood pressure 153/86, pulse 72, temperature 98 °F (36 7 °C), temperature source Tympanic, resp  rate 18, height 6' 3" (1 905 m), weight (!) 162 kg (357 lb 2 3 oz), SpO2 93 %  ,Body mass index is 44 64 kg/m²  Intake/Output Summary (Last 24 hours) at 10/10/17 1043  Last data filed at 10/10/17 0506   Gross per 24 hour   Intake                0 ml   Output             1275 ml   Net            -1275 ml       Physical Exam:    Alert and awake in no acute distress  Head normocephalic, oral membranes are moist   Neck supple, no lymphadenopathy  Lungs clear to auscultation bilaterally  Heart AFib  Abdomen soft, active bowel sounds, non-tender, non-distended  Extremities:  LEs swelling of the right hand  Skin: warm, dry, no discrete lesions  Delete        Invasive Devices     Peripheral Intravenous Line            Peripheral IV 10/08/17 Left Antecubital 1 day                            Lab, Imaging and other studies: I have personally reviewed pertinent reports         Results from last 7 days  Lab Units 10/09/17  0443 10/08/17  1111   WBC Thousand/uL 7 86 8 88   HEMOGLOBIN g/dL 11 6* 13 0   HEMATOCRIT % 37 1 41 2   PLATELETS Thousands/uL 191 228   NEUTROS PCT %  --  73   LYMPHS PCT %  --  14   MONOS PCT %  --  12   EOS PCT %  --  1       Results from last 7 days  Lab Units 10/10/17  0530 10/09/17  0443 10/08/17  1111   SODIUM mmol/L 134* 135* 134*   POTASSIUM mmol/L 4 6 4 3 4 5   CHLORIDE mmol/L 102 102 98*   CO2 mmol/L 26 27 26   BUN mg/dL 36* 29* 30*   CREATININE mg/dL 1 14 1 10 1 45*   CALCIUM mg/dL 8 5 8 3 8 5   TOTAL PROTEIN g/dL  --   --  8 3*   BILIRUBIN TOTAL mg/dL  --   --  0 90   ALK PHOS U/L  --   --  178*   ALT U/L  --   --  26   AST U/L  --   --  15   GLUCOSE RANDOM mg/dL 256* 202* 259*     Lab Results   Component Value Date    TROPONINI <0 02 10/08/2017    CKTOTAL 55 10/08/2017       Results from last 7 days  Lab Units 10/08/17  1111   INR  1 13     Lab Results   Component Value Date    BLOODCX No Growth at 24 hrs  10/08/2017    BLOODCX No Growth at 24 hrs  10/08/2017    BLOODCX No Growth After 5 Days  09/15/2016    BLOODCX No Growth After 5 Days  09/15/2016    WOUNDCULT 4+ Growth of Staphylococcus aureus 09/15/2016    WOUNDCULT 4+ Growth of Proteus mirabilis 09/15/2016    WOUNDCULT 4+ Growth of Mixed Skin Nini 09/15/2016       Imaging:  Results for orders placed in visit on 07/01/15   XR chest portable    Narrative CHEST    INDICATION-  Diffuse pain in left foot  Generalized weakness  COMPARISON-  April 6, 2015   VIEWS-   AP frontal     1 image   FINDINGS-        Cardiomediastinal silhouette appears unremarkable  Mild pulmonary vasculature congestion noted  There is obscuration of   the left hemidiaphragm potentially effusion or atelectasis  Pneumonia   to be considered in the right clinical setting  Visualized osseous structures appear within normal limits for the   patient's age  IMPRESSION-   Obscuration left hemidiaphragm may represent effusion, atelectasis, or   pneumonia  Mild pulmonary vascular congestion     Transcribed on- [id number]           [address], RAD DO        Reading Radiologist- TYESHA Whitt DO        Electronically Signed- TYESHA Whitt DO        Released Date Time- 07/01/15 1814      ------------------------------------------------------------------------------   [address]       Results for orders placed during the hospital encounter of 10/08/17   XR chest 2 views    Narrative CHEST 2 View    INDICATION:  Chest pain  Arm infection  COMPARISON:  12/21/2015    VIEWS:  PA and lateral projections; 3 images    FINDINGS:         Heart shadow is enlarged but stable from prior exam    Persistent bibasilar patchy atelectasis  Visualized osseous structures appear within normal limits for the patient's age  Impression Stable cardiomegaly  Patchy bibasilar atelectasis  Workstation performed: [id number]         PATIENT CENTERED ROUNDS: I have performed rounds with the nursing staff  This note has been constructed using a voice recognition system      Nura Calhoun MD

## 2017-10-10 NOTE — PHYSICAL THERAPY NOTE
PT eval   10/10/17 1050   Note Type   Note type Eval only   Pain Assessment   Pain Assessment Montalvo-Baker FACES   Montalvo-Baker FACES Pain Rating 6   Pain Type Acute pain   Pain Location Wrist   Pain Orientation Right   Home Living   Type of Home House   Home Layout Able to live on main level with bedroom/bathroom; Two level;Ramped entrance   [address] [facility]; Wheelchair-manual   Additional Comments states struggling with mobility and adls pta   Prior Function   Level of [place] Needs assistance with IADLs; Needs assistance with ADLs and functional mobility   Lives With Alone   Receives Help From Friend(s)   ADL Assistance Needs assistance   IADLs Needs assistance   Falls in the last 6 months 0   Vocational Retired   Comments needs assist for lower body dressing/care   Easily sob with short amb   Restrictions/Precautions   Weight Bearing Precautions Per Order No   General   Additional Pertinent History adm with gout R ue tessa wrist/hand  unable to put weight on Rue   Family/Caregiver Present Yes   Cognition   Overall Cognitive Status WFL   Arousal/Participation Alert   Orientation Level Oriented X4   Memory Within functional limits   Following Commands Follows all commands and directions without difficulty   Comments states he needs help at home unable to perform lower body care   RUE Assessment   RUE Assessment (see OT limited wrist and hand)   LUE Assessment   LUE Assessment WFL   RLE Assessment   RLE Assessment (arom wfl hip strength 3/5)   LLE Assessment   LLE Assessment (arom wfl strength hip 3/5)   Coordination   Movements are Fluid and Coordinated 0   Coordination and Movement Description see OT   Bed Mobility   Rolling R 7  Independent   Rolling L 7  Independent   Supine to Sit 6  Modified independent   Additional items Bedrails   Sit to Supine 6  Modified independent   Additional items Bedrails   Transfers   Sit to Stand 4  Minimal assistance   Additional items Assist x 1   Stand to Sit 4  Minimal assistance   Additional items Assist x 1;Bed elevated; Increased time required; Bedrails   Stand pivot 4  Minimal assistance   Additional items Assist x 1   Ambulation/Elevation   Gait pattern Improper Weight shift; Wide LISA; Forward Flexion; Shuffling; Inconsistent bailee; Excessively slow   Gait Assistance 4  Minimal assist   Additional items Assist x 1   Assistive Device Straight cane   Distance 5'   Balance   Static Sitting Normal   Dynamic Sitting Good   Static Standing Good   Dynamic Standing Fair -   Ambulatory Fair -   Activity Tolerance   Activity Tolerance Patient limited by pain   Medical Staff Made Aware yes   Nurse Made Aware yes   Assessment   Prognosis Good   Problem List Decreased strength;Decreased endurance; Impaired balance;Decreased mobility; Decreased coordination;Pain   Assessment Pt presetns with impaired strength adn mobility after adm for gout flare up R ue   Difficulty using assistive device due to R hand pain and swelling  Difficulty with sit-stand due to prox le weakness  Pt will benefit from skilled TP services ot regain safe ind funcitonal mobility  REcommend shor tterm in-pt rehab atd/c  Will follow see goals  Barriers to Discharge Decreased caregiver support   Goals   Patient Goals get better   STG Expiration Date 10/20/17   Short Term Goal #1 1) safe ind transfers 2) improve strength B les 1/2 grade 3) safe ind ambwith rw vs cane 150' level no sob 4) improve balance to fair+   Plan   Treatment/Interventions ADL retraining;Functional transfer training;LE strengthening/ROM; Therapeutic exercise; Endurance training;Patient/family training;Equipment eval/education;Gait training;Spoke to case management;Spoke to nursing;Spoke to MD;OT   PT Frequency 5x/wk   Recommendation   Recommendation Short-term skilled PT   Equipment Recommended Walker;Cane   Barthel Index   Feeding 5   Bathing 0   Grooming Score 0   Dressing Score 5   Bladder Score 10   Bowels Score 10   Toilet Use Score 5   Transfers (Bed/Chair) Score 10   Mobility (Level Surface) Score 0   Stairs Score 0   Barthel Index Score 45   Tran Quezada, PT

## 2017-10-11 VITALS
SYSTOLIC BLOOD PRESSURE: 144 MMHG | HEIGHT: 75 IN | HEART RATE: 70 BPM | BODY MASS INDEX: 39.17 KG/M2 | OXYGEN SATURATION: 92 % | WEIGHT: 315 LBS | RESPIRATION RATE: 20 BRPM | DIASTOLIC BLOOD PRESSURE: 84 MMHG | TEMPERATURE: 97.4 F

## 2017-10-11 PROBLEM — I50.32 CHRONIC DIASTOLIC CONGESTIVE HEART FAILURE (HCC): Status: ACTIVE | Noted: 2017-10-11

## 2017-10-11 LAB
ANION GAP SERPL CALCULATED.3IONS-SCNC: 9 MMOL/L (ref 4–13)
BUN SERPL-MCNC: 35 MG/DL (ref 5–25)
CALCIUM SERPL-MCNC: 8.7 MG/DL (ref 8.3–10.1)
CHLORIDE SERPL-SCNC: 103 MMOL/L (ref 100–108)
CO2 SERPL-SCNC: 24 MMOL/L (ref 21–32)
CREAT SERPL-MCNC: 1.06 MG/DL (ref 0.6–1.3)
GFR SERPL CREATININE-BSD FRML MDRD: 73 ML/MIN/1.73SQ M
GLUCOSE SERPL-MCNC: 205 MG/DL (ref 65–140)
GLUCOSE SERPL-MCNC: 209 MG/DL (ref 65–140)
GLUCOSE SERPL-MCNC: 228 MG/DL (ref 65–140)
POTASSIUM SERPL-SCNC: 4.7 MMOL/L (ref 3.5–5.3)
SODIUM SERPL-SCNC: 136 MMOL/L (ref 136–145)

## 2017-10-11 PROCEDURE — 82948 REAGENT STRIP/BLOOD GLUCOSE: CPT

## 2017-10-11 PROCEDURE — 97530 THERAPEUTIC ACTIVITIES: CPT

## 2017-10-11 PROCEDURE — 97535 SELF CARE MNGMENT TRAINING: CPT

## 2017-10-11 PROCEDURE — 80048 BASIC METABOLIC PNL TOTAL CA: CPT | Performed by: INTERNAL MEDICINE

## 2017-10-11 PROCEDURE — 94640 AIRWAY INHALATION TREATMENT: CPT

## 2017-10-11 PROCEDURE — 94760 N-INVAS EAR/PLS OXIMETRY 1: CPT

## 2017-10-11 RX ORDER — FUROSEMIDE 80 MG
80 TABLET ORAL 2 TIMES DAILY
Qty: 60 TABLET | Refills: 0 | Status: SHIPPED | OUTPATIENT
Start: 2017-10-11 | End: 2018-04-16 | Stop reason: CLARIF

## 2017-10-11 RX ORDER — PREDNISONE 20 MG/1
40 TABLET ORAL DAILY
Qty: 6 TABLET | Refills: 0 | Status: SHIPPED | OUTPATIENT
Start: 2017-10-11 | End: 2017-10-14 | Stop reason: ALTCHOICE

## 2017-10-11 RX ADMIN — METOPROLOL TARTRATE 25 MG: 25 TABLET ORAL at 09:39

## 2017-10-11 RX ADMIN — INSULIN LISPRO 1 UNITS: 100 INJECTION, SOLUTION INTRAVENOUS; SUBCUTANEOUS at 12:53

## 2017-10-11 RX ADMIN — FUROSEMIDE 80 MG: 80 TABLET ORAL at 09:39

## 2017-10-11 RX ADMIN — INSULIN DETEMIR 50 UNITS: 100 INJECTION, SOLUTION SUBCUTANEOUS at 09:47

## 2017-10-11 RX ADMIN — ALLOPURINOL 300 MG: 300 TABLET ORAL at 09:39

## 2017-10-11 RX ADMIN — HEPARIN SODIUM 5000 UNITS: 5000 INJECTION, SOLUTION INTRAVENOUS; SUBCUTANEOUS at 05:22

## 2017-10-11 RX ADMIN — COLCHICINE 0.6 MG: 0.6 TABLET, FILM COATED ORAL at 09:39

## 2017-10-11 RX ADMIN — LOSARTAN POTASSIUM 50 MG: 50 TABLET ORAL at 09:39

## 2017-10-11 RX ADMIN — FLUTICASONE PROPIONATE AND SALMETEROL 1 PUFF: 50; 250 POWDER RESPIRATORY (INHALATION) at 09:42

## 2017-10-11 RX ADMIN — INSULIN LISPRO 1 UNITS: 100 INJECTION, SOLUTION INTRAVENOUS; SUBCUTANEOUS at 09:40

## 2017-10-11 NOTE — OCCUPATIONAL THERAPY NOTE
Occupational Therapy Progress Note      Patient Name: Yola TOWNSENDIBA'Y Date: 10/11/2017    Problem List:   Patient Active Problem List   Diagnosis    [email or web address]    DM2 (diabetes mellitus, type 2) ([email or web address]    Gout    Hypertension    Chronic atrial fibrillation ([email or web address]    Morbid obesity ([email or web address]    Acute gout    Chronic diastolic congestive heart failure ([email or web address] )                10/11/17 0905   Pain Assessment   Pain Score 5   Pain Location Elbow   Pain Orientation Right   ADL   Grooming Assistance 4  Minimal Assistance   Grooming Deficit Wash/dry hands; Wash/dry face; Teeth care;Brushing hair  (uses nondom L)   Toileting Assistance  5  Supervision/Setup   Toileting Deficit Increased time to complete   Toileting Comments standing urinating in BR   Bed Mobility   Supine to Sit 6  Modified independent   Additional items HOB elevated; Bedrails   Transfers   Sit to Stand 5  Supervision   Additional items Armrests; Increased time required   Stand to Sit 5  Supervision   Additional items Armrests   Stand pivot 5  Supervision   Additional items Increased time required   Functional Mobility   Functional Mobility 5  Supervision   Additional Comments bed>< BR   Cognition   Overall Cognitive Status WFL   Arousal/Participation Cooperative   Attention Within functional limits   Orientation Level Oriented X4   Memory Within functional limits   Following Commands Follows all commands and directions without difficulty   Activity Tolerance   Activity Tolerance Patient tolerated treatment well   Assessment   Assessment pt is showing improvement in R hand functon but conts w limited elbow arom and pain, which impedes eating, grooming w dominant RUE  He is deconditioned but trying to increase mobility     Recommendation   OT Discharge Recommendation Short Term [facility],

## 2017-10-11 NOTE — DISCHARGE SUMMARY
Discharge Summary - Paul Cummins 74 y o  male MRN: [id number]    Unit/Bed#: 2 [facility] 207-01 Encounter: [id number]    Admission Date: 10/8/2017     Admitting Diagnosis: Swelling of limb [M79 89]  Swelling of arm [M79 89]  Gout flare [M10 9]    HPI: Please refer to H+P on the chart! Procedures Performed:   Orders Placed This Encounter   Procedures    ED ECG Documentation Only       Hospital Course:  Patient was admitted with acute gout of the right elbow and right wrist was treated with IV steroids and colchicine  He did develop some loose stools on colchicine therefore on discharge she was placed on prednisone and will complete 3 more days  Patient's blood sugars were elevated due to steroids and he was discharged on pre meal Humalog in addition to his usual Levemir doses  Patient will continue taking Lasix 80 mg twice a day for his chronic diastolic CHF and will be maintained on a low salt diet  He will continue taking Advair for his chronic COPD  He will continue taking metoprolol for rate control of his chronic atrial fibrillation  Patient needed SNF rehab after his acute gout episodes before returning home he was discharged in stable condition to SNF  Discharge took 35 minutes  Significant Findings, Care, Treatment and Services Provided: see Hospital Course! Complications: none    Discharge Diagnosis:   Patient Active Problem List    Diagnosis Date Noted    Acute gout 10/09/2017     Priority: High    Chronic diastolic congestive heart failure ([facility] ) 10/11/2017    Chronic atrial fibrillation ([facility] ) 10/09/2017    [facility]bid [facility] 10/09/2017    COPD (chronic obstructive pulmonary disease) [facility] ) 09/15/2016    DM2 (diabetes mellitus, type 2) ([facility] ) 09/15/2016    Gout 09/15/2016    Hypertension 09/15/2016         Condition at Discharge: stable     Discharge instructions/Information to patient and family:   See after visit summary for information provided to patient and family  Provisions for Follow-Up Care:  See after visit summary for information related to follow-up care and any pertinent home health orders  Disposition: Home    Planned Readmission: No    Discharge Statement    I had direct contact with the patient on the day of discharge  Discharge Medications:  See after visit summary for reconciled discharge medications provided to patient and family        Vidhya Gonzales MD

## 2017-10-11 NOTE — SOCIAL WORK
Patient is medically cleared for discharge to [facility] today  Patients has a friend who will transport today

## 2017-10-11 NOTE — PROGRESS NOTES
Pt observed to be awake approx half of hrly rounds overnight; denies pain  Voiding qs in urinal   Left antecub area found to be reddened with pinpoint sized blister-like area; cleansed and bandaid applied

## 2017-10-13 LAB
BACTERIA BLD CULT: NORMAL
BACTERIA BLD CULT: NORMAL

## 2017-11-10 ENCOUNTER — ALLSCRIPTS OFFICE VISIT (OUTPATIENT)
Dept: OTHER | Facility: OTHER | Age: 67
End: 2017-11-10

## 2018-01-03 ENCOUNTER — ALLSCRIPTS OFFICE VISIT (OUTPATIENT)
Dept: OTHER | Facility: OTHER | Age: 68
End: 2018-01-03

## 2018-01-03 DIAGNOSIS — E11.9 TYPE 2 DIABETES MELLITUS WITHOUT COMPLICATIONS (HCC): ICD-10-CM

## 2018-01-03 DIAGNOSIS — M10.9 GOUT: ICD-10-CM

## 2018-01-03 DIAGNOSIS — R74.8 ABNORMAL LEVELS OF OTHER SERUM ENZYMES: ICD-10-CM

## 2018-01-09 NOTE — ED NOTES
Pt alert and oriented  Pt sitting upright on stretcher        Dmitry Busby RN  10/08/17 2564 Pt remains in critical condition with tachy-leigh episodes and poor mental status.

## 2018-01-09 NOTE — MISCELLANEOUS
Assessment    1  Diabetes mellitus type 2, uncontrolled (250 02) (E11 65)   2  Gout (274 9) (M10 9)   3  Hyperlipidemia (272 4) (E78 5)    Plan  Chronic venous hypertension w ulceration, bilateral, Hypertension    · From  Furosemide 40 MG Oral Tablet take 1 to 2 tablets by mouth daily To  Furosemide 80 MG Oral Tablet TAKE 1 TABLET TWICE DAILY   Rx By: Ronny Vila; Dispense: 30 Days ; #:60 Tablet; Refill: 5; For: Chronic venous hypertension w ulceration, bilateral, Hypertension; DEVONTE = N; Record  Diabetes mellitus type 2, uncontrolled    · Levemir FlexTouch 100 UNIT/ML Subcutaneous Solution Pen-injector   Rx By: Ronny Vila; Dispense: 90 Days ; #:3 X 3 ML Pen (5 Pens); Refill: 3; For: Diabetes mellitus type 2, uncontrolled; DEVONTE = N; Sent To: [facility] (Mail Order)   · NovoLOG FlexPen 100 UNIT/ML Subcutaneous Solution Pen-injector   Rx By: Ronny Vila; Dispense: 90 Days ; #:3 X 3 ML Pen (5 Pens); Refill: 3; For: Diabetes mellitus type 2, uncontrolled; DEVONTE = N; Sent To: [facility] #[id number]  · Basaglar KwikPen 100 UNIT/ML Subcutaneous Solution Pen-injector; 50 UNITS  TID   Rx By: Ronny Vila; Dispense: 30 Days ; #:3 X 3 ML Pen (5 Pens); Refill: 0; For: Diabetes mellitus type 2, uncontrolled; DEVONTE = N; Verified Transmission to [facility] #[id number]; Last Updated By: System, SureScripts; 11/10/2017 11:49:23 AM  Hypertension    · Losartan Potassium 50 MG Oral Tablet; take 1 tablet by mouth daily   Rx By: Lashay Hoyt; Dispense: 90 Days ; #:90 Tablet; Refill: 3; For: Hypertension; DEVONTE = N; Verified Transmission to [facility] #[id number]; Last Updated By: System, SureScripts; 11/10/2017 11:49:24 AM  Mild persistent asthma without complication    · Ventolin  (90 Base) MCG/ACT Inhalation Aerosol Solution; INHALE 2  PUFFS EVERY 4 HOURS AS NEEDED   Rx By: Ronny Vila; Dispense: 30 Days ; #:1 X 18 GM Inhaler;  Refill: 2; For: Mild persistent asthma without complication; DEVONTE = N; Verified Transmission to [facility] [id number]; Last Updated By: System, SureScripts; 11/10/2017 11:49:25 AM  Screening for genitourinary condition    · *VB - Urinary Incontinence Screen (Dx Z13 89 Screen for UI); Status:Complete;   Done:  41JPA3994 11:27AM   Performed: In Office; BUY:50OHF9909;IOGIFME; For:Screening for genitourinary condition; Ordered By:[facility]; Unlinked    · Albuterol 90 MCG/ACT AERS   Dispense: 0 Days ; #: Sufficient AERS; Refill: 0; DEVONTE = N; Record; Last Updated By: Anu Pruitt; 11/10/2017 11:48:19 AM    Discussion/Summary  Discussion Summary:   Will check with cardiology what med he wants pt on for cholesterol, in process of getting a Medicare D plan now that he is 74 but won't have until 12/17, got flu shot at either hospital or nursing facility  Medication SE Review and Pt Understands Tx: Possible side effects of new medications were reviewed with the patient/guardian today  The treatment plan was reviewed with the patient/guardian  The patient/guardian understands and agrees with the treatment plan      Chief Complaint  Chief Complaint Free Text Note Form: Pt here for [facility]ADI-f/u after gout and got therapy at SNF, sugars have been okay but last night had 2 slices of pizza and bs up at 350      History of Present Illness  TCM Communication [facility]: The patient is being contacted for follow-up after hospitalization  He was hospitalized at [facility] in [place]  The date of admission: 10/11/2017, date of discharge: 11/03/2017  Diagnosis: guot in hands  He was discharged to home, was in rehab for 3 weeks  Medications reviewed and updated today  He scheduled a follow up appointment     Symptoms: arm pain right side, but no fever, no weakness, no dizziness, no headache, no fatigue, no cough, no shortness of breath, no chest pain, no back pain on left side, no back pain on right side, no arm pain left side, no leg pain on left side, no leg pain on right side, no upper abdominal pain, no middle abdominal pain, no lower abdominal pain, no rash:, no anorexia, no nausea, no vomiting, no loose stools, no constipation, no pain with urinating, no incisional pain, no wound drainage and no swelling  arm hurting due to gout Counseling was provided to the patient  Communication performed and completed by SW      Review of Systems  Complete-Male:   Cardiovascular: lower extremity edema, but no chest pain  Respiratory: shortness of breath and shortness of breath during exertion  Musculoskeletal: limb pain and arm pain due to gout flare up  Neurological: no headache  Preventive Quality 65 Older:   Preventive Quality 65 and Older: The patient is currently experiencing urinary symptoms  Urinary Incontinence Symptoms includes: urinary frequency, urinary urgency, weak stream and post-void dribbling, but no urinary incontinence, no incomplete bladder emptying, no urinary hesitancy, no dysuria, no nocturia, no straining and no intermittent stream       Active Problems    1  Blood alkaline phosphatase increased compared with prior measurement (790 5)   (R74 8)   2  Cardiomyopathy (425 4) (I42 9)   3  Chronic atrial fibrillation (427 31) (I48 2)   4  Chronic foot ulcer (707 15) (L97 509)   5  Chronic heel ulcer (707 14) (L97 409)   6  Chronic heel ulcer, left, with fat layer exposed (707 14) (L97 422)   7  Chronic ulcer of toe of left foot, limited to breakdown of skin (707 15) (L97 521)   8  Chronic venous hypertension w ulceration, bilateral (459 31) (I87 313)   9  Decubitus ulcer of left heel, stage 3 (741 70,238 41) (K45 390)   10  Diabetes mellitus type 2 with peripheral artery disease (250 70,443 81) (E11 51)   11  Diabetes mellitus type 2, uncontrolled (250 02) (E11 65)   12  Diabetes, polyneuropathy (250 60,357 2) (E11 42)   13  Diabetic ulcer of heel (250 80,707 14) (E11 621,L97 409)   14  Encounter for prostate cancer screening (V76 44) (Z12 5)   15  Gallstones (574 20) (K80 20)   16   GERD (gastroesophageal reflux disease) (530 81) (K21 9)   17  Gout (274 9) (M10 9)   18  Hyperlipidemia (272 4) (E78 5)   19  Hypertension (401 9) (I10)   20  Mild persistent asthma without complication (441 26) (A23 89)   21  Obesity (278 00) (E66 9)   22  Onychomycosis (110 1) (B35 1)   23  Peripheral arterial disease (443 9) (I73 9)   24  Shortness of breath (786 05) (R06 02)   25  Status post skin graft (V42 3) (Z94 5)   26  Type 2 diabetes mellitus (250 00) (E11 9)   27  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, right (454 0) (I83 019)   28  Varicose veins of lower extremity with ulcer, left (454 0) (I83 029)    Past Medical History    1  History of Cough (786 2) (R05)   2  History of Decubitus ulcer of heel (707 07,707 20) (L89 609)   3  History of Fatigue (780 79) (R53 83)   4  Denied: History of alcohol abuse   5  Denied: History of mental problems   6  History of pain when walking (V13 89) (Z87 898)   7  History of seasonal allergies (V15 09) (Z88 9)   8  Denied: History of substance abuse   9  History of Intermittent claudication (443 9) (I73 9)   10  History of Leg swelling (729 81) (M79 89)   11  History of Neuropathy (355 9) (G62 9)   12  History of Shortness of breath (786 05) (R06 02)   13  History of Varicose veins (454 9) (I83 90)    Surgical History    1  History of Angioplasty W/ Fluorosc Angiography Peripheral Artery Additional   2  History of Full Thickness Skin Graft   3  History of Transcath Intravascular Stent Placement Percutaneous Femoral  Surgical History Reviewed: The surgical history was reviewed and updated today  Family History  Mother    1  Family history of cardiac disorder (V17 49) (Z82 49)   2  Family history of diabetes mellitus (V18 0) (Z83 3)  Father    3  Family history of malignant neoplasm (V16 9) (Z80 9)  Other    4  Denied: Family history of alcohol abuse   5  Denied: Family history of substance abuse  Unknown    6  FH: mental illness (V17 0) (Z81 8)  Family History    7   Family history of cardiac disorder (V17 49) (Z82 49)   8  Family history of diabetes mellitus (V18 0) (Z83 3)   9  Family history of malignant neoplasm (V16 9) (Z80 9)  Family History Reviewed: The family history was reviewed and updated today  Social History    · Denied: History of Active advance directive   · Denied: History of Always uses seat belt   · Caffeine use (V49 89) (F15 90)   · [employer] ([address]   ·    · Feels safe at home   · [employer]   · Lives with family   · Never smoker   · No living will   · One child   · Power of  in existence   · Primary language is English   · Rarely consumes alcohol (V49 89) (Z78 9)   · Retired   · Supportive and safe  Social History Reviewed: The social history was reviewed and updated today  Current Meds   1  Albuterol 90 MCG/ACT AERS; Therapy: (Recorded:10Nov2017) to Recorded   2  Allopurinol 300 MG Oral Tablet; Therapy: (Recorded:10Nov2017) to Recorded   3  BD Pen Needle Maame U/F 32G X 4 MM Miscellaneous; USE AS DIRECTED 6 TIMES A   DAY; Therapy: 48DDA3366 to (Evaluate:30Oct2017)  Requested for: 64LDF9749; Last   Rx:03May2017 Ordered   4  Breo Ellipta 100-25 MCG/INH Inhalation Aerosol Powder Breath Activated; Therapy: (Recorded:10Nov2017) to Recorded   5  Colchicine 0 6 MG Oral Tablet; Therapy: (Recorded:10Nov2017) to Recorded   6  Dakins (1/4 strength) 0 125 % External Solution; USE TOPICALLY AS DIRECTED; Therapy: 38AHQ5053 to (Last Rx:04Aug2016)  Requested for: 06Uuk7929 Ordered   7  Furosemide 40 MG Oral Tablet; take 1 to 2 tablets by mouth daily; Therapy: 15WQE7243 to (Evaluate:11Bzq7748)  Requested for: 39Tkg5616; Last   Rx:56Wnq9111 Ordered   8  HumaLOG KwikPen 100 UNIT/ML Subcutaneous Solution Pen-injector; Therapy: (Recorded:10Nov2017) to Recorded   9  Levemir FlexTouch 100 UNIT/ML Subcutaneous Solution Pen-injector; INJECT 50 UNIT    twice a  day;    Therapy: 86GQL3873 to ([facility]73BBT1832)  Requested for: 48WSM6154; Last   Rx:77Mhw0028 Ordered   10  Lidocaine HCl - 4 % External Solution; USE AS DIRECTED  As at [facility] to wounds prior to    debridement prn pain; Therapy: (Recorded:18Feb2016) to Recorded   11  Losartan Potassium 50 MG Oral Tablet; take 1 tablet by mouth daily; Therapy: 34XFO1791 to (Evaluate:11Aug2018)  Requested for: 20Oct2017; Last    Rx:17Ubb2103; Status: ACTIVE - Renewal Denied Ordered   12  Metoprolol Tartrate 25 MG Oral Tablet; take 1 tablet by mouth every 12 hours; Therapy: 85HKB3348 to (Evaluate:11Aug2018)  Requested for: 72XME9194; Last    Rx:16Aug2017 Ordered   13  NovoLOG FlexPen 100 UNIT/ML Subcutaneous Solution Pen-injector; 20 units sq tid ac; Therapy: 48KNW5570 to (Evaluate:15Oct2016)  Requested for: 89KPW8444; Last    Rx:21Oct2015 Ordered   14  OneTouch Delica Lancets Fine Miscellaneous; check BS 4xday; Therapy: 87VFO2738 to (Evaluate:22Jan2016)  Requested for: 75ZQJ0362; Last    Rx:27Jan2015 Ordered   15  OneTouch Ultra Blue In [employer]; check 4/day; Therapy: 56NQZ4413 to (Evaluate:15Aug2017)  Requested for: 80KHB6389; Last    Rx:17Jan2017 Ordered  Medication List Reviewed: The medication list was reviewed and updated today  Allergies    1  No Known Drug Allergies    Vitals  Signs   Recorded: 39DBQ6757 11:32AM   Temperature: 98 F, Temporal  Heart Rate: 62  Respiration: 16  Systolic: 927  Diastolic: 78  Height: 6 ft 3 in  Weight: 362 lb   BMI Calculated: 45 25  BSA Calculated: 2 82    Physical Exam    Constitutional   General appearance: Abnormal   chronically ill, uncomfortable and morbidly obese  Pulmonary   Auscultation of lungs: Clear to auscultation, equal breath sounds bilaterally, no wheezes, no rales, no rhonci  Cardiovascular   Auscultation of heart: Abnormal   The heart rate was normal  The rhythm was irregularly irregular  Examination of extremities for edema and/or varicosities: Abnormal   bilateral ankle 1+ pitting edema and bilateral pretibial 1+ pitting edema     Carotid pulses: Normal     Lymphatic   Palpation of lymph nodes in neck: No lymphadenopathy  Musculoskeletal   Inspection/palpation of joints, bones, and muscles: Abnormal   Palpation - right wrist and right hand tenderness  Results/Data  *VB - Urinary Incontinence Screen (Dx Z13 89 Screen for UI) 60CWY1474 11:27AM Ray Harden     Test Name Result Flag Reference   Urinary Incontinence Assessment [id number]         Signatures   Electronically signed by :  Fleurette Severin, MD; Nov 10 2017 11:57AM EST                       (Author)

## 2018-01-11 NOTE — MISCELLANEOUS
Physical Exam    Wound #1 Assessment wound #1 Location:, left heel, Care for this wound started on 10/27/2016  Wound Status: not healed  Diabetic Ulcer Grade/Lower Extremity: Gutiérrez 1  Length: 1 7cm x Width: 0 8cm x Depth: 0 2cm   Total: 1 36sq cm   Wound Volume: 0 272cm3           Tissue type: Granulation and Slough   Color of Wound: Yellow - 10%   Exudate Amount: Moderate   Exudate Type: Serosangiunous   Odor: None   Wound Edges: Callous and Epidermal Migration   Periwound Skin Condition: Macerated, Callus, Scaly        Wound #2 Assessment wound #2 Location:, left lower leg (2 areas), Care for this wound started on 2/10/17  Wound Status: not healed  Venous Ulcer: Full Thickness  Length: 1 4cm x Width: 0 4cm x Depth: 0 1cm   Total: 0 56sq cm   Wound Volume: 0 056cm3           Tissue type: Granulation   Color of Wound: Pink - 100%   Exudate Amount: Moderate   Exudate Type: Serous   Odor: None   Exudate Color: Yellow   Wound Edges: Intact   Periwound Skin Condition: Hemosiderin pigmentation, Scaly, Edema        Wound #3 Assessment wound #3 Location:, left great toe  Care for this wound started on 2/10/17   Wound Status: not healed  Diabetic Ulcer Grade/Lower Extremity: Gutiérrez 1  Length: 0 8cm x Width: 0 2cm x Depth: 0 2cm   Total: 0 16sq cm   Wound Volume: 0 032cm3           Tissue type: Granulation and Eschar   Color of Wound: Red - 90% and Black - 10%   Exudate Amount: Minimal   Odor: None   Wound Edges: Callous   Periwound Skin Condition: Callus         Wound Drsg  Orders/Instructions  Wound Identification Dressing Orders--Instructions:   Wound Identification and Instructions   Wound #1: left heel    Wound Care Instructions  Discussed with Patient/Caregiver  Dressing Type: Alginate  Wash with mild soap and water, normal saline, wound cleanser  Apply specified dressing to wound base/bed  Secondary dressing apply: ABD  Secure with: Kerlix, tape  Dressing change frequency:  Three times per week  Offloading: Global pedi shoe   Apply compression using: Tubigrip G  Wound #2: left lower leg    Wound Care Instructions  Discussed with Patient/Caregiver  Dressing Type: Alginate  Wash with mild soap and water, normal saline, wound cleanser  Apply specified dressing to wound base/bed  Secondary dressing apply: Meplex border  Dressing change frequency: Three times per week   Apply compression using: Tubigrip G  Wound #3: left great toe   Wound Care Instructions  Discussed with Patient/Caregiver  Dressing Type: Cleatus Frock with mild soap and water, normal saline, wound cleanser  Apply specified dressing to wound base/bed  Secondary dressing apply: Gauze  Secure with: tape  Dressing change frequency: Three times per week  Offloading: Global pedi shoe   Apply compression using: Tubigrip G  Future Appointments    Date/Time Provider Specialty Site   02/13/2017 10:00 AM Mathieu Marquez MD Family Medicine [address] PRACTICE   02/16/2017 10:45 AM Aida Carter DPM Wound Care [facility] [address] Plan  Wound Nursing Care Plan Garrett Saba: Wound Nursing Care Plan   Problem: left heel and left great toe wounds   Impaired Tissue Integrity related to: heel ulcer   Imbalanced Nutrition, less than body requirements related to inadequate intake and/or disease process:   Impaired Mobility related to: heel wound, use of walker   Self Care Deficit related to wound dressing changes:   Risk for Unstable Blood Glucose related to increased metabolic demand required for wound healing and/or immune response to wound infection:   Risk for Fall related to criteria noted on Fall Risk Assessment:   Ineffective Adherence to the Plan of Care related to:   Goals   Patient will achieve 100% epithelialization:  Patient will maintain skin integrity:  Patient will demonstrate and verbalize knowledge of their disease process and management:     Patient will verbalize signs and symptoms of infection and when to notify physician or [facility]:    Patient will verbalize knowledge of and demonstrate adherence to interventions to achieve optimal nutrition required for wound healing:  Patient will be able to maintain or increase current ability to perform ADLs: Sheridan Ko Patient will not experience a fall:  Patient will demonstrate offloading and body positioning to effectively decrease edema:    Other Goals: patient will increase adherence to plan of care  Wound Nursing Care Interventions:   Provide moist wound healing:  Evaluate current medication regime for medications which may slow/impede wound healing:  Implement offloading measures (turn & reposition schedule/method, ortho shoes/boots, floating heels, crutches, specialty surfaces:  Teach and evaluate effectiveness of offloading measures:  Teach patient and/or family about disease process and management methods:  Assess patient's nutrition on each wound care visit (including protein and fluid intake): Sheridan Ko Educate on diet and hydration required to enhance wound healing:  Assess mobility and how it may affect wound healing:  Assess patient's knowledge of diabetes management:  Complete Fall Risk Assessment upon admission to wound program and with change in condition/implement identified interventions:  Teach patient on edema reducing measures:    Elevate legs above heart 30 minutes 3 times a day, walk 30 minutes daily, reduced sodium diet, diuretics as ordered, wear compression hose or wrap as ordered:  Assess for patient compliance to established plan of care: Sheridan Ko    Ask the patient to verbalize current barriers to care:  12/8/2016 reviewed; reviewed 1/18/17      Signatures   Electronically signed by : Sheela Coppola RN; Feb 10 2017  1:12PM EST                       (Author)

## 2018-01-12 VITALS
HEART RATE: 76 BPM | HEIGHT: 75 IN | BODY MASS INDEX: 39.17 KG/M2 | SYSTOLIC BLOOD PRESSURE: 138 MMHG | DIASTOLIC BLOOD PRESSURE: 80 MMHG | RESPIRATION RATE: 18 BRPM | TEMPERATURE: 98.3 F | WEIGHT: 315 LBS

## 2018-01-12 VITALS
BODY MASS INDEX: 39.17 KG/M2 | WEIGHT: 315 LBS | HEIGHT: 75 IN | TEMPERATURE: 98 F | HEART RATE: 64 BPM | DIASTOLIC BLOOD PRESSURE: 82 MMHG | SYSTOLIC BLOOD PRESSURE: 124 MMHG | RESPIRATION RATE: 20 BRPM

## 2018-01-12 VITALS
HEIGHT: 75 IN | HEART RATE: 64 BPM | TEMPERATURE: 98.3 F | BODY MASS INDEX: 39.17 KG/M2 | SYSTOLIC BLOOD PRESSURE: 124 MMHG | RESPIRATION RATE: 20 BRPM | DIASTOLIC BLOOD PRESSURE: 86 MMHG | WEIGHT: 315 LBS

## 2018-01-12 NOTE — PROGRESS NOTES
Assessment    1  Diabetes, polyneuropathy (250 60,357 2) (E11 42)   2  Chronic heel ulcer, left, with fat layer exposed (707 14) (L97 632)    Wound Care Orders/Instructions    Wound Identification and Instructions   Wound #1: left heel    Wound Care Instructions  Discussed with Patient/Caregiver  Dressing Type: Alginate  Secondary dressing apply: Gauze  Secure with: Kerlix  Dressing change frequency: Every other day   Apply compression using: Tubigrip F  Wound Goals  Wound Goals:   Healing Goals:   Fair healing potential, expect slow healing progress secondary to co-morbid conditions and advanced age  Wound edges will appear with evidence of contraction and epithelialization   Patient will achieve full wound closure and return to full ADLs       Discussion/Summary    Continue serial debridement, compression, and elevation  Continue Silver alginate dressing  He will return in 1 week  The treatment plan was reviewed with the patient/guardian  The patient/guardian understands and agrees with the treatment plan      Chief Complaint  Follow up visit for left heel wound pressure ulcer  History of Present Illness    Wound Identification HPI   Wound #1: left heel      The patient came to Wound Care via wheelchair  The patient is being seen for a follow-up with MD at the [address] [place] [address]  The patient is accompanied by his sister  The patient's identification was verified  A secondary verification process was completed  Orientation: oriented to person, oriented to place and oriented to time  Blood Glucose:  120 mg/dL as reported by patient  7/16/2015: Dressing intact on heel wounds on arrival  Resident of [facility]  7/23/15: Patient arrived to appointment dressings intact  Patient reports that his dressings are being changed as directed  Patient arrived from [facility]   7/30/15: Patient arrived to appointment with dressing intact, strike though drainage noted on left heel with surgical shoe  Foam pad over right heel and right lower leg YULISSA  No complaints reported  8/6/15: Patient arrived to appointment with dressing intact over left heel and right heel and right lower leg YULISSA  No complaints reported  8/13/15: Patient arrives to [facility] with dressings intact, no problems reported  Staff at [facility] changing as per order  8/20/15: Dressing intact on left heel on arrival, green drainage seen today  Patient is now home, sent home Tuesday morning  Reports that the [facility] will be seeing him, orders to be sent to [facility]  Edema seen in both lower legs today, patient states he is not elevating his legs  8/27/2015:Patient arrives to [facility] with dressing intact  Patient reports he accidently scratched his right lower leg and there is a new wound there  There is also a new intact blood blister to the right great toe measuring 0 8 by 0 5  [facility]  9/3/15 Patient arrived to the wound management center dressings intact and tubi x2 on the bilateral legs   No complaints   Harshad De Jesus reports that the patient the patient is bearing full weight on the Left heel , eating poorly , no protein and lots of sugar   Last blood sugar was 350 as per the VNA   Reviewed and discussed with the patient of how his blood sugars have been and the compliance for the non weight bearing   The patient reports his sugars have been good and he has been eating protein for the wound healing  09/10/2015: Patient arrives to [facility] with dressings intact, dressings changed by VNA as per order  9/17/2015: Patient arrives to [facility] with dressing intact and no problems reported  10/1/2015: Dressing intact on left heel, no drainage  Bilateral tubigrip F intact  States the [facility] has been changing as ordered  10/27/2015: Patient arrives to [facility] with dressing intact for newly reopened wound to left heel  Patient reports he noticed drainage on friday 10/23/2015 and has kept it covered until today  Patient has been wearing surgical shoes since discharged 10/01/215 and has has been unable to see  since prescription given  11/5/2015: Here for wound care follow-up   Arrives to [facility] with dressing intact  11/12/2015: Here for wound care follow-up 11/19/2015: Here for wound care follow-up  Dressing intact upon arrival and no problems reported  12/3/2015: Here for wound care follow-up  Patient arrived to appointment with dressing dry and intact  Patient reports that [facility] has been coming out every other day for dressing changes  No complaints reported  12/10/2015: Patient arrived to [facility] with dressing dry and intact over left heel which [facility] has been changing  No complaints reported  12/17/15: Patient arrived to appointment with dressing dry and intact  Patient reports that [facility] is changing the dressings  No complaints reported  12/31/2015: Here for wound care follow-up, patient currently at [address] [place] [address],[address] SNF for rehab status post hospitalization for CHF  No problems reported  01/07/2016: Patient arrives to [facility] with dressing dry and intact followed with surgical shoe  Patient arrived from [facility]  1/21/16: Patient arrived to appointment with dressing dry and intact  Patient reports that he is supposed to be getting discharged from [facility] today or tomorrow  No complaints reported  1/28/2016: Here for wound care follow-up, patient came home from [facility] on Friday, [facility] has been changing dressings every other day  Denies any problems  History of Falling: Yes = 25   Secondary Diagnosis: No = 0   Ambulatory Aid Crutches, Grady Lay = 15   No = 0   Gait: Weak = 10 -- Short steps (may shuffle), stooped but able to lift head while walking, may seek support from furniture while walking, but with light touch (for reassurance)     Mental Status: Overestimates, forgets limitations = 15   Total Score: 65     > 45 = High Risk   The most recent fall occurred Denies any falls since last visit  Nutrition Assessment Screening: Food intake over the last 3 months due to the loss of appetite, digestive problems, chewing or swallowing difficulties is graded as: 2 = no decrease in food intake   Weight loss during the last 3 months: 3 = no weight loss   Mobility scored as: 2 = goes out  Psychological Stress and Acute Disease Scored as: 2 = The patient has not experienced psychological stress or acute disease in the last 3 months  Neuropsychological problems scored as: 2 = no psychological problems  Body Mass Index (BMI) scored as: 3 = BMI 23 or greater  Nutritional Assessment Screening Score: 12 - 14 points - Normal nutritional status  Provider Wound Care HPI: Jean-Claude Johnson returned for left heel wound  No foot pain or new problems  He was discharged from NH last Friday  Pain Assessment   the patient states they do not have pain  (on a scale of 0 to 10, the patient rates the pain at 0 )   Abuse And Domestic Violence Screen    Yes, the patient is safe at home  The patient states no one is hurting them  Depression And Suicide Screen  No, the patient has not had thoughts of hurting themself  No, the patient has not felt depressed in the past 7 days  Prefered Language is  [place]  Primary Language is  English  Readiness To Learn: Receptive  Barriers To Learning: none  Preferred Learning: demonstration   Education Completed: treatment/procedure   Teaching Method: verbal   Person Taught: patient and friend   Evaluation Of Learning: verbalized/demonstrated understanding      Review of Systems    Constitutional: no fever or chills, feels well, no tiredness, no recent weight loss or weight gain  Cardiovascular: no complaints of slow or fast heart rate, no chest pain, no palpitations, no leg claudication or lower extremity edema  Respiratory: no complaints of shortness of breath, no wheezing or cough, no dyspnea on exertion, no orthopnea or PND     Gastrointestinal: no complaints of abdominal pain, no constipation, no nausea or vomiting, no diarrhea or bloody stools  Active Problems    1  Asthma (493 90) (J45 909)   2  Cellulitis (682 9) (L03 90)   3  Cellulitis of heel, left (682 7) (L03 116)   4  Chronic foot ulcer (707 15) (L97 509)   5  Chronic heel ulcer (707 14) (L97 409)   6  Chronic heel ulcer, left, with fat layer exposed (707 14) (L97 422)   7  Chronic venous hypertension w ulceration, bilateral (459 31) (I87 313)   8  Decubitus ulcer of left heel, stage 3 (682 21,678 75) (R95 808)   9  Diabetes mellitus type 2 with peripheral artery disease (250 70,443 81) (E11 51)   10  Diabetes mellitus type 2, uncontrolled (250 02) (E11 65)   11  Diabetes, polyneuropathy (250 60,357 2) (E11 42)   12  Diabetic ulcer of heel (250 80,707 14) (E11 621,L97 409)   13  GERD (gastroesophageal reflux disease) (530 81) (K21 9)   14  Gout (274 9) (M10 9)   15  Hyperlipidemia (272 4) (E78 5)   16  Hypertension (401 9) (I10)   17  Obesity (278 00) (E66 9)   18  Onychomycosis (110 1) (B35 1)   19  Skin ulcer of right foot with fat layer exposed (707 15) (L97 512)   20  Type 2 diabetes mellitus (250 00) (E11 9)    Past Medical History    1  History of Cough (786 2) (R05)   2  History of Decubitus ulcer of heel (707 07,707 20) (L89 609)   3  History of Fatigue (780 79) (R53 83)   4  History of pain when walking (V13 89) (Z87 898)   5  History of seasonal allergies (V15 09) (Z88 9)   6  History of Intermittent claudication (443 9) (I73 9)   7  History of Leg swelling (729 81) (M79 89)   8  History of Neuropathy (355 9) (G62 9)   9  History of Shortness of breath (786 05) (R06 02)   10  History of Varicose veins (454 9) (I86 8)    The active problems and past medical history were reviewed and updated today  Surgical History    1  History of Transcath Intravascular Stent Placement Percutaneous Femoral    Family History    1  Family history of cardiac disorder (V17 49) (Z82 49)   2   Family history of diabetes mellitus (V18 0) (Z83 3)    3  Family history of malignant neoplasm (V16 9) (Z80 9)    4  Family history of cardiac disorder (V17 49) (Z82 49)   5  Family history of diabetes mellitus (V18 0) (Z83 3)   6  Family history of malignant neoplasm (V16 9) (Z80 9)    Social History    · Caffeine use (V49 89) (F15 90)   · Never smoker   · One child   · Rarely consumes alcohol (V49 89) (Z78 9)   · Retired  The social history was reviewed and updated today  Current Meds   1  Advair Diskus 250-50 MCG/DOSE Inhalation Aerosol Powder Breath Activated; INHALE 1   PUFF EVERY 12 HOURS Recorded   2  Allopurinol 100 MG Oral Tablet; TAKE 2 TABLETS DAILY  Requested for: 77CBK8693; Last   Rx:49Sqn4931 Ordered   3  BD Pen Needle Maame U/F 32G X 4 MM Miscellaneous; USE AS DIRECTED 6 TIMES A   DAY; Therapy: 18AIR8669 to (Evaluate:01Jan2016)  Requested for: 89MYC3694; Last   Rx:30Lvl0125 Ordered   4  Furosemide 40 MG Oral Tablet; Take 1 tablet daily  Requested for: 21PYN9005; Last   Rx:94Bhm5647 Ordered   5  Levemir FlexTouch 100 UNIT/ML Subcutaneous Solution Pen-injector; INJECT 50 UNIT    twice a  day; Therapy: 52KAC2487 to (Evaluate:15Oct2016)  Requested for: 31COB0292; Last   Rx:21Oct2015 Ordered   6  Metoprolol Tartrate 25 MG Oral Tablet; 1 tablet every 12hrs  Requested for: 11RHY6636; Last Rx:09Nov2015 Ordered   7  NovoLOG FlexPen 100 UNIT/ML Subcutaneous Solution Pen-injector; 20 units sq tid ac; Therapy: 06JEK1497 to (Evaluate:15Oct2016)  Requested for: 58BWV9702; Last   Rx:21Oct2015 Ordered   8  OneTouch Delica Lancets Fine Miscellaneous; check BS 4xday; Therapy: 81TTX7011 to (Evaluate:22Jan2016)  Requested for: 68BTL5550; Last   Rx:27Jan2015 Ordered   9  OneTouch Ultra Blue In [employer]; check 4/day; Therapy: 97DUV7725 to (Evaluate:16Mar2016)  Requested for: 93GWY3159; Last   Rx:82Qtg4624 Ordered   10  Proventil  (90 Base) MCG/ACT Inhalation Aerosol Solution;     Therapy: (Recorded:38Mup4524) to Recorded    The medication list was reviewed and updated today  Allergies    1  No Known Drug Allergies    Vitals  Vital Signs [Data Includes: Current Encounter]    Recorded: 36CUW4441 10:13AM   Temperature 96 F   Heart Rate 68   Respiration 20   Systolic 472   Diastolic 96   Height 6 ft 3 in   Weight 346 lb    BMI Calculated 43 25   BSA Calculated 2 77   Pain Scale 0     Physical Exam    Wound #1 Assessment wound #1 Location:, left heel, Care for this wound started on 10/27/2015  Wound Status: not healed  Pressure Ulcer Grade: Stage III  Length: 2cm x Width: 0 8cm x Depth: 0 2cm   Total: 1 6sq cm            Tissue type: Granulation, Slough and biofilm layer   Color of Wound: Red - 50% and Yellow - 50%   Exudate Amount: Moderate   Odor: None   Wound Edges: Callous   Periwound Skin Condition: Callus        Physician/Provider Wound #1 Exam   I agree with the nursing assessment and documentation  Jono Araujo 1: Wound Nursing Care Plan   Problem: left heel   Goals   Patient will achieve 100% epithelialization:  Wound Nursing Care Interventions:   Implement offloading measures (turn & reposition schedule/method, ortho shoes/boots, floating heels, crutches, specialty surfaces:  Teach and evaluate effectiveness of offloading measures:  Teach neuropathy management interventions:  Shake shoes before donning, no bare feet, daily visual inspection of both feet, do not soak feet, skin care, routine podiatry exams:  Nurse care plan 1/21/16      Procedure      Wound #1: left heel     Nurse Dressing Change:   Wound #1 The wound located on the left heel  Wound care rendered as per Physician/Advanced Practitioner order/plan  Order sent to Home Care  Return to Wound Management Center one week  Comments:    Excisional Debridement Subcutaneous Tissue:   Wound was excisionally debrided to subcutaneous tissue as follows     Prior to the procedure, the patient was identified using two identifiers, the general consent was signed, the proper site of procedure was identified, and a time out was taken  Anesthesia: Local anesthesia with 4% topical lidocaine was utilized prior to the procedure for pain control  #15 surgical blade was utilized to surgically excise devitalized tissue and/or slough through epidermis, dermis and into the subcutaneous tissue  The total sq cm excised was 1 6sq cm  See wound assessment for further details  There was moderate bleeding controlled with gentle pressure  the patient tolerated the procedure well without complication  CPT Code(s)   [id number] - Excisional DebridementTo Subcutaneous Tissue; first 20 sq cm        Future Appointments    Date/Time Provider Specialty Site   02/04/2016 10:45 AM Rosa Moyer     Signatures   Electronically signed by : Melissa Perla DPM; Feb 1 2016  8:50AM EST                       (Author)

## 2018-01-12 NOTE — RESULT NOTES
Verified Results  (1) GLUCOSE, 2 HR PP [id number] 01:23PM Yany Lagunas     Test Name Result Flag Reference   GLUCOSE, POSTPRANDIAL/$2 HOUR 285 mg/dL H <140

## 2018-01-13 VITALS
RESPIRATION RATE: 20 BRPM | DIASTOLIC BLOOD PRESSURE: 80 MMHG | HEART RATE: 72 BPM | BODY MASS INDEX: 39.17 KG/M2 | WEIGHT: 315 LBS | SYSTOLIC BLOOD PRESSURE: 138 MMHG | TEMPERATURE: 97.9 F | HEIGHT: 75 IN

## 2018-01-13 VITALS
WEIGHT: 315 LBS | HEIGHT: 75 IN | HEART RATE: 70 BPM | SYSTOLIC BLOOD PRESSURE: 132 MMHG | BODY MASS INDEX: 39.17 KG/M2 | DIASTOLIC BLOOD PRESSURE: 80 MMHG

## 2018-01-13 VITALS
SYSTOLIC BLOOD PRESSURE: 126 MMHG | HEART RATE: 77 BPM | DIASTOLIC BLOOD PRESSURE: 82 MMHG | TEMPERATURE: 97.2 F | BODY MASS INDEX: 39.17 KG/M2 | RESPIRATION RATE: 18 BRPM | WEIGHT: 315 LBS | HEIGHT: 75 IN

## 2018-01-13 VITALS
DIASTOLIC BLOOD PRESSURE: 88 MMHG | BODY MASS INDEX: 45.81 KG/M2 | WEIGHT: 315 LBS | TEMPERATURE: 96.9 F | SYSTOLIC BLOOD PRESSURE: 162 MMHG | HEART RATE: 90 BPM | RESPIRATION RATE: 18 BRPM

## 2018-01-13 VITALS
TEMPERATURE: 100.4 F | HEART RATE: 72 BPM | BODY MASS INDEX: 39.17 KG/M2 | SYSTOLIC BLOOD PRESSURE: 164 MMHG | HEIGHT: 75 IN | WEIGHT: 315 LBS | DIASTOLIC BLOOD PRESSURE: 80 MMHG | RESPIRATION RATE: 16 BRPM

## 2018-01-13 VITALS
RESPIRATION RATE: 18 BRPM | SYSTOLIC BLOOD PRESSURE: 140 MMHG | HEIGHT: 75 IN | HEART RATE: 78 BPM | BODY MASS INDEX: 39.17 KG/M2 | WEIGHT: 315 LBS | DIASTOLIC BLOOD PRESSURE: 76 MMHG | TEMPERATURE: 98.1 F

## 2018-01-13 VITALS
DIASTOLIC BLOOD PRESSURE: 80 MMHG | HEART RATE: 74 BPM | WEIGHT: 315 LBS | TEMPERATURE: 96.5 F | HEIGHT: 75 IN | BODY MASS INDEX: 39.17 KG/M2 | RESPIRATION RATE: 18 BRPM | SYSTOLIC BLOOD PRESSURE: 132 MMHG

## 2018-01-13 VITALS
DIASTOLIC BLOOD PRESSURE: 80 MMHG | WEIGHT: 315 LBS | HEIGHT: 75 IN | HEART RATE: 76 BPM | SYSTOLIC BLOOD PRESSURE: 132 MMHG | BODY MASS INDEX: 39.17 KG/M2

## 2018-01-13 NOTE — MISCELLANEOUS
Physical Exam    Wound #1 Assessment wound #1 Location:, left heel, Care for this wound started on 10/27/2015  Wound Status: not healed  Pressure Ulcer Grade: Stage III  Length: 2cm x Width: 0 8cm x Depth: 0 2cm   Total: 1 6sq cm            Tissue type: Granulation, Slough and biofilm layer   Color of Wound: Red - 50% and Yellow - 50%   Exudate Amount: Moderate   Odor: None   Wound Edges: Callous   Periwound Skin Condition: Callus        Physician/Provider Wound #1 Exam   I agree with the nursing assessment and documentation  Wound Drsg  Orders/Instructions  Wound Identification Dressing Orders--Instructions:   Wound Identification and Instructions   Wound #1: left heel    Wound Care Instructions  Discussed with Patient/Caregiver  Dressing Type: Alginate  Secondary dressing apply: Gauze  Secure with: Kerlix  Dressing change frequency: Every other day   Apply compression using: Tubigrip F  Wound Goals  Wound Goals:   Healing Goals:   Fair healing potential, expect slow healing progress secondary to co-morbid conditions and advanced age  Wound edges will appear with evidence of contraction and epithelialization   Patient will achieve full wound closure and return to full ADLs       Future Appointments    Date/Time Provider Specialty Site   02/04/2016 10:45 AM Rosa Sheldon 1: Wound Nursing Care Plan   Problem: left heel   Goals   Patient will achieve 100% epithelialization:  Wound Nursing Care Interventions:   Implement offloading measures (turn & reposition schedule/method, ortho shoes/boots, floating heels, crutches, specialty surfaces:  Teach and evaluate effectiveness of offloading measures:  Teach neuropathy management interventions:  Shake shoes before donning, no bare feet, daily visual inspection of both feet, do not soak feet, skin care, routine podiatry exams:   Nurse care plan 1/21/16      Signatures   Electronically signed by : Riaz Irwin DPM; Feb 1 2016  8:50AM EST                       (Author)

## 2018-01-14 VITALS
TEMPERATURE: 97.8 F | HEIGHT: 75 IN | RESPIRATION RATE: 18 BRPM | SYSTOLIC BLOOD PRESSURE: 160 MMHG | BODY MASS INDEX: 39.17 KG/M2 | HEART RATE: 72 BPM | WEIGHT: 315 LBS | DIASTOLIC BLOOD PRESSURE: 80 MMHG

## 2018-01-14 VITALS
SYSTOLIC BLOOD PRESSURE: 140 MMHG | WEIGHT: 315 LBS | TEMPERATURE: 98 F | HEIGHT: 75 IN | DIASTOLIC BLOOD PRESSURE: 78 MMHG | HEART RATE: 62 BPM | RESPIRATION RATE: 16 BRPM | BODY MASS INDEX: 39.17 KG/M2

## 2018-01-14 VITALS
DIASTOLIC BLOOD PRESSURE: 80 MMHG | HEART RATE: 78 BPM | BODY MASS INDEX: 39.17 KG/M2 | RESPIRATION RATE: 17 BRPM | WEIGHT: 315 LBS | HEIGHT: 75 IN | SYSTOLIC BLOOD PRESSURE: 140 MMHG | TEMPERATURE: 97.8 F

## 2018-01-14 VITALS
TEMPERATURE: 98.1 F | DIASTOLIC BLOOD PRESSURE: 74 MMHG | HEART RATE: 72 BPM | RESPIRATION RATE: 20 BRPM | SYSTOLIC BLOOD PRESSURE: 126 MMHG

## 2018-01-14 VITALS
WEIGHT: 315 LBS | HEART RATE: 77 BPM | RESPIRATION RATE: 18 BRPM | SYSTOLIC BLOOD PRESSURE: 130 MMHG | BODY MASS INDEX: 39.17 KG/M2 | HEIGHT: 75 IN | DIASTOLIC BLOOD PRESSURE: 90 MMHG

## 2018-01-14 VITALS
HEIGHT: 75 IN | BODY MASS INDEX: 39.17 KG/M2 | WEIGHT: 315 LBS | RESPIRATION RATE: 18 BRPM | DIASTOLIC BLOOD PRESSURE: 70 MMHG | TEMPERATURE: 98.9 F | HEART RATE: 76 BPM | SYSTOLIC BLOOD PRESSURE: 140 MMHG

## 2018-01-14 VITALS
WEIGHT: 315 LBS | HEART RATE: 72 BPM | TEMPERATURE: 97.9 F | SYSTOLIC BLOOD PRESSURE: 138 MMHG | BODY MASS INDEX: 39.17 KG/M2 | RESPIRATION RATE: 20 BRPM | DIASTOLIC BLOOD PRESSURE: 80 MMHG | HEIGHT: 75 IN

## 2018-01-14 VITALS
HEIGHT: 75 IN | WEIGHT: 315 LBS | SYSTOLIC BLOOD PRESSURE: 136 MMHG | RESPIRATION RATE: 18 BRPM | BODY MASS INDEX: 39.17 KG/M2 | DIASTOLIC BLOOD PRESSURE: 80 MMHG | HEART RATE: 65 BPM

## 2018-01-14 NOTE — RESULT NOTES
Verified Results  * US ABDOMEN COMPLETE [id number] 01:22PM Moise Talamantes Order Number: [id number]    - Patient Instructions: To schedule this appointment, please contact Central Scheduling at [phone]  Test Name Result Flag Reference   US ABDOMEN COMPLETE (Report)     ABDOMEN ULTRASOUND, COMPLETE     INDICATION: Abnormal LFTs  Abdominal swelling  COMPARISON: CT AP 7/10/2010     TECHNIQUE:  Real-time ultrasound of the abdomen was performed with a curvilinear transducer with both volumetric sweeps and still imaging techniques  FINDINGS:     PANCREAS: Not visualized  AORTA AND IVC: Visualized portions are normal for patient age  LIVER:   Size: Enlarged  The liver measures 18 7 cm in the midclavicular line  Contour: Surface contour is smooth  Parenchyma: There is moderate diffuse increased echogenicity with smooth echotexture and acoustic beam attenuation  Most consistent with moderate hepatic steatosis  No evidence of suspicious mass  The main portal vein is patent and hepatopetal       BILIARY:   The gallbladder is normal in caliber  No wall thickening or pericholecystic fluid  Multiple shadowing gallstones  Sonographic Garlan Edwards sign is negative  No intrahepatic biliary dilatation  CBD measures 4 mm  No choledocholithiasis  KIDNEY:    Right kidney measures 14 1 x 6 5 cm  Within normal limits  Left kidney measures 14 5 x 7 0 cm  Simple cortical cyst measures 1 5 x 1 4 cm  SPLEEN:    Measures 12 9 cm  Within normal limits  ASCITES: None  IMPRESSION:     Hepatomegaly with fatty infiltration  Multiple gallstones  No wall thickening or pericholecystic fluid  Negative Lentz's sign  Workstation performed: [id number]     Signed by:    Gamaliel Smalls MD   7/5/17       Plan  Blood alkaline phosphatase increased compared with prior measurement, Gallstones    · 1 - Itzel Clarke MD, Marta Garcia  (General Surgery) Co-Management  *  Status: Active Requested  for: [address] [place] [address] Summary provided  : Yes

## 2018-01-14 NOTE — RESULT NOTES
Verified Results  (Q) LIPID PANEL WITH REFLEX TO DIRECT LDL [id number] 11:19AM "[employer]     Test Name Result Flag Reference   CHOLESTEROL, TOTAL 147 mg/dL  125-200   HDL CHOLESTEROL 37 mg/dL L > OR = 40   TRIGLICERIDES 094 mg/dL H <150   LDL-CHOLESTEROL 77 mg/dL (calc)  <130   Desirable range <100 mg/dL for patients with CHD or  diabetes and <70 mg/dL for diabetic patients with  known heart disease  CHOL/HDLC RATIO 4 0 (calc)  < OR = 5 0   NON HDL CHOLESTEROL 110 mg/dL (calc)     Target for non-HDL cholesterol is 30 mg/dL higher than   LDL cholesterol target  (1) COMPREHENSIVE METABOLIC PANEL 29MLE1964 86:79BR "[employer]     Test Name Result Flag Reference   GLUCOSE 156 mg/dL H 65-99   Fasting reference interval     For someone without known diabetes, a glucose  value >125 mg/dL indicates that they may have  diabetes and this should be confirmed with a  follow-up test    UREA NITROGEN (BUN) 30 mg/dL H 7-25   CREATININE 1 31 mg/dL H 0 70-1 25   For patients >52years of age, the reference limit  for Creatinine is approximately 13% higher for people  identified as -American  eGFR NON-AFR   AMERICAN 57 mL/min/1 73m2 L > OR = 60   eGFR AFRICAN AMERICAN 66 mL/min/1 73m2  > OR = 60   BUN/CREATININE RATIO 23 (calc) H 6-22   SODIUM 139 mmol/L  135-146   POTASSIUM 4 7 mmol/L  3 5-5 3   CHLORIDE 101 mmol/L     CARBON DIOXIDE 28 mmol/L  20-31   CALCIUM 9 2 mg/dL  8 6-10 3   PROTEIN, TOTAL 7 7 g/dL  6 1-8 1   ALBUMIN 4 0 g/dL  3 6-5 1   GLOBULIN 3 7 g/dL (calc)  1 9-3 7   ALBUMIN/GLOBULIN RATIO 1 1 (calc)  1 0-2 5   BILIRUBIN, TOTAL 0 6 mg/dL  0 2-1 2   ALKALINE PHOSPHATASE 150 U/L H    AST 13 U/L  10-35   ALT 10 U/L  9-46     (Q) TSH, 3RD GENERATION [id number] 11:19AM "[facility] [employer]     Test Name Result Flag Reference   TSH 2 61 mIU/L  0 40-4 50     (1) PSA (SCREEN) (Dx V76 44 Screen for Prostate Cancer) [id number] 11:19AM Domingo Graves     Test Name Result Flag Reference   PSA, TOTAL 0 5 ng/mL  < OR = 4 0   This test was performed using the Siemens  chemiluminescent method  Values obtained from  different assay methods cannot be used  interchangeably  PSA levels, regardless of  value, should not be interpreted as absolute  evidence of the presence or absence of disease  (Q) HEMOGLOBIN A1c [id number] 11:19AM Iris Mccarty   REPORT COMMENT:  FASTING:YES     Test Name Result Flag Reference   HEMOGLOBIN A1c 9 9 % of total Hgb H <5 7   For someone without known diabetes, a hemoglobin A1c  value of 6 5% or greater indicates that they may have   diabetes and this should be confirmed with a follow-up   test      For someone with known diabetes, a value <7% indicates   that their diabetes is well controlled and a value   greater than or equal to 7% indicates suboptimal   control  A1c targets should be individualized based on   duration of diabetes, age, comorbid conditions, and   other considerations  Currently, no consensus exists regarding use of  hemoglobin A1c for diagnosis of diabetes for children  Plan  Diabetes mellitus type 2, uncontrolled    · (1) GLUCOSE, 2 HR PP; Status:Active;  Requested for:39Ejy1498;

## 2018-01-15 NOTE — RESULT NOTES
Verified Results  (Q) COMPREHENSIVE METABOLIC PNL W/O CO2, ALT, eGFR [id number] 12:00PM Joshua Fennel     Test Name Result Flag Reference   GLUCOSE 204 mg/dL H 65-99   Fasting reference interval   UREA NITROGEN (BUN) 30 mg/dL H 7-25   CREATININE 1 18 mg/dL  0 70-1 25   For patients >52years of age, the reference limit  for Creatinine is approximately 13% higher for people  identified as -American  BUN/CREATININE RATIO 25 (calc) H 6-22   SODIUM 138 mmol/L  135-146   POTASSIUM 4 7 mmol/L  3 5-5 3   CHLORIDE 101 mmol/L     CALCIUM 9 5 mg/dL  8 6-10 3   PROTEIN, TOTAL 7 1 g/dL  6 1-8 1   ALBUMIN 4 0 g/dL  3 6-5 1   GLOBULIN 3 1 g/dL (calc)  1 9-3 7   ALBUMIN/GLOBULIN RATIO 1 3 (calc)  1 0-2 5   BILIRUBIN, TOTAL 0 6 mg/dL  0 2-1 2   ALKALINE PHOSPHATASE 151 U/L H    AST 15 U/L  10-35     (Q) LIPID PANEL WITH DIRECT LDL [id number] 12:00PM Joshua Fennel     Test Name Result Flag Reference   CHOLESTEROL, TOTAL 155 mg/dL  125-200   HDL CHOLESTEROL 43 mg/dL  > OR = 40   TRIGLICERIDES 393 mg/dL H <150   DIRECT LDL 86 mg/dL  <130   Desirable range <100 mg/dL for patients with CHD or  diabetes and <70 mg/dL for diabetic patients with  known heart disease  CHOL/HDLC RATIO 3 6 (calc)  < OR = 5 0   NON HDL CHOLESTEROL 112 mg/dL (calc)     Target for non-HDL cholesterol is 30 mg/dL higher than   LDL cholesterol target  (Q) HEMOGLOBIN A1c WITH eAG [id number] 12:00PM [facility]   REPORT COMMENT:  FASTING:YES     Test Name Result Flag Reference   HEMOGLOBIN A1c 9 4 % of total Hgb H <5 7   According to ADA guidelines, hemoglobin A1c <7 0%  represents optimal control in non-pregnant diabetic  patients  Different metrics may apply to specific  patient populations  Standards of Medical Care in    Diabetes Care   2013;36:s11-s66     For the purpose of screening for the presence of  diabetes  <5 7%       Consistent with the absence of diabetes  5 7-6 4%    Consistent with increased risk for diabetes (prediabetes)  >or=6 5%    Consistent with diabetes     This assay result is consistent with diabetes  mellitus  Currently, no consensus exists for use of hemoglobin  A1c for diagnosis of diabetes for children  eAG (mg/dL) 223 (calc)     eAG (mmol/L) 12 4 (calc)       (Q) MICROALBUMIN, RANDOM URINE (W/CREATININE) [id number] 12:00PM [employer]     Test Name Result Flag Reference   CREATININE, RANDOM URINE 152 mg/dL     MICROALBUMIN 0 8 mg/dL     Reference Range  Not established   MICROALBUMIN/CREATININE$RATIO, RANDOM URINE 5 mcg/mg creat  <30   The ADA defines abnormalities in albumin  excretion as follows:     Category         Result (mcg/mg creatinine)     Normal                    <30  Microalbuminuria            Clinical albuminuria   > OR = 300     The ADA recommends that at least two of three  specimens collected within a 3-6 month period be  abnormal before considering a patient to be  within a diagnostic category       (Q) TSH, 3RD GENERATION [id number] 12:00PM [facility]     Test Name Result Flag Reference   TSH 2 69 mIU/L  0 40-4 50     (1) URIC ACID [id number] 12:00PM [facility]     Test Name Result Flag Reference   URIC ACID 7 0 mg/dL  4 0-8 0   Therapeutic target for gout patients: <6 0 mg/dL

## 2018-01-17 ENCOUNTER — LAB CONVERSION - ENCOUNTER (OUTPATIENT)
Dept: OTHER | Facility: OTHER | Age: 68
End: 2018-01-17

## 2018-01-17 ENCOUNTER — GENERIC CONVERSION - ENCOUNTER (OUTPATIENT)
Dept: OTHER | Facility: OTHER | Age: 68
End: 2018-01-17

## 2018-01-17 LAB
A/G RATIO (HISTORICAL): 1 (CALC) (ref 1–2.5)
ALBUMIN SERPL BCP-MCNC: 3.6 G/DL (ref 3.6–5.1)
ALP SERPL-CCNC: 242 U/L (ref 40–115)
ALT SERPL W P-5'-P-CCNC: 13 U/L (ref 9–46)
AST SERPL W P-5'-P-CCNC: 13 U/L (ref 10–35)
B-NP NT PRO (HISTORICAL): 1783 PG/ML
BILIRUB SERPL-MCNC: 0.6 MG/DL (ref 0.2–1.2)
BUN SERPL-MCNC: 21 MG/DL (ref 7–25)
BUN/CREA RATIO (HISTORICAL): ABNORMAL (CALC) (ref 6–22)
CALCIUM SERPL-MCNC: 9 MG/DL (ref 8.6–10.3)
CHLORIDE SERPL-SCNC: 101 MMOL/L (ref 98–110)
CO2 SERPL-SCNC: 30 MMOL/L (ref 20–31)
CREAT SERPL-MCNC: 0.99 MG/DL (ref 0.7–1.25)
EGFR AFRICAN AMERICAN (HISTORICAL): 92 ML/MIN/1.73M2
EGFR-AMERICAN CALC (HISTORICAL): 80 ML/MIN/1.73M2
GAMMA GLOBULIN (HISTORICAL): 3.5 G/DL (CALC) (ref 1.9–3.7)
GLUCOSE (HISTORICAL): 136 MG/DL (ref 65–99)
HBA1C MFR BLD HPLC: 9.6 % OF TOTAL HGB
POTASSIUM SERPL-SCNC: 4.7 MMOL/L (ref 3.5–5.3)
SODIUM SERPL-SCNC: 140 MMOL/L (ref 135–146)
TOTAL PROTEIN (HISTORICAL): 7.1 G/DL (ref 6.1–8.1)
URIC ACID (HISTORICAL): 6.5 MG/DL (ref 4–8)

## 2018-01-17 NOTE — RESULT NOTES
Verified Results  (1) LIPID PANEL, FASTING [id number] 10:33AM [email or web address]     Test Name Result Flag Reference   CHOLESTEROL, TOTAL 165 mg/dL  125-200   HDL CHOLESTEROL 40 mg/dL  > OR = 40   TRIGLICERIDES 617 mg/dL H <150   LDL-CHOLESTEROL 81 mg/dL (calc)  <130   Desirable range <100 mg/dL for patients with CHD or  diabetes and <70 mg/dL for diabetic patients with  known heart disease  CHOL/HDLC RATIO 4 1 (calc)  < OR = 5 0   NON HDL CHOLESTEROL 125 mg/dL (calc)     Target for non-HDL cholesterol is 30 mg/dL higher than   LDL cholesterol target  (1) URIC ACID [id number] 10:33AM [email or web address]     Test Name Result Flag Reference   URIC ACID 9 2 mg/dL H 4 0-8 0   Therapeutic target for gout patients: <6 0 mg/dL     (1) COMPREHENSIVE METABOLIC PANEL [id number] 49:66AP [email or web address]     Test Name Result Flag Reference   GLUCOSE 146 mg/dL H 65-99   Fasting reference interval   UREA NITROGEN (BUN) 24 mg/dL  7-25   CREATININE 1 00 mg/dL  0 70-1 25   For patients >52years of age, the reference limit  for Creatinine is approximately 13% higher for people  identified as -American  eGFR NON-AFR   AMERICAN 80 mL/min/1 73m2  > OR = 60   eGFR AFRICAN AMERICAN 92 mL/min/1 73m2  > OR = 60   BUN/CREATININE RATIO   7-12   NOT APPLICABLE (calc)   SODIUM 139 mmol/L  135-146   POTASSIUM 4 2 mmol/L  3 5-5 3   CHLORIDE 100 mmol/L     CARBON DIOXIDE 27 mmol/L  19-30   CALCIUM 9 4 mg/dL  8 6-10 3   PROTEIN, TOTAL 7 4 g/dL  6 1-8 1   ALBUMIN 3 9 g/dL  3 6-5 1   GLOBULIN 3 5 g/dL (calc)  1 9-3 7   ALBUMIN/GLOBULIN RATIO 1 1 (calc)  1 0-2 5   BILIRUBIN, TOTAL 0 5 mg/dL  0 2-1 2   ALKALINE PHOSPHATASE 142 U/L H    AST 14 U/L  10-35   ALT 17 U/L  9-46     (Q) MICROALBUMIN, RANDOM URINE (W/CREATININE) [id number] 10:33AM [email or web address]     Test Name Result Flag Reference   CREATININE, RANDOM URINE 140 mg/dL     MICROALBUMIN 3 0 mg/dL     Reference Range  Not established   MICROALBUMIN/CREATININE$RATIO, RANDOM URINE 21 mcg/mg creat  <30   The ADA defines abnormalities in albumin  excretion as follows:     Category         Result (mcg/mg creatinine)     Normal                    <30  Microalbuminuria            Clinical albuminuria   > OR = 300     The ADA recommends that at least two of three  specimens collected within a 3-6 month period be  abnormal before considering a patient to be  within a diagnostic category  (Q) TSH, 3RD GENERATION [id number] 10:33AM Reji Bell     Test Name Result Flag Reference   TSH 2 87 mIU/L  0 40-4 50     (Q) HEMOGLOBIN A1c [id number] 10:33AM Reji Bell   REPORT COMMENT:  FASTING:YES     Test Name Result Flag Reference   HEMOGLOBIN A1c 8 3 % of total Hgb H <5 7   According to ADA guidelines, hemoglobin A1c <7 0%  represents optimal control in non-pregnant diabetic  patients  Different metrics may apply to specific  patient populations  Standards of Medical Care in    Diabetes Care  2013;36:s11-s66     For the purpose of screening for the presence of  diabetes  <5 7%       Consistent with the absence of diabetes  5 7-6 4%    Consistent with increased risk for diabetes              (prediabetes)  >or=6 5%    Consistent with diabetes     This assay result is consistent with diabetes  mellitus  Currently, no consensus exists for use of hemoglobin  A1c for diagnosis of diabetes for children  Plan  Gout    · From  Allopurinol 100 MG Oral Tablet TAKE 2 TABLETS DAILY To Allopurinol  300 MG Oral Tablet take 1 tablet every day   · (1) URIC ACID; Status:Active; Requested [id number];   Hyperlipidemia    · (Q) LIPID PANEL WITH DIRECT LDL; Status:Active; Requested [id number];   Type 2 diabetes mellitus    · (Q) COMPREHENSIVE METABOLIC PNL W/ADJUSTED CALCIUM; Status:Active; Requested [id number];    · (Q) HEMOGLOBIN A1c WITH eAG; Status:Active;  Requested [id number];

## 2018-01-17 NOTE — RESULT NOTES
Verified Results  VAS LOWER LIMB ARTERIAL DUPLEX, COMPLETE BILATERAL/GRAFTS [id number] 12:31PM Felicia Senior Order Number: [id number]    - Patient Instructions: To schedule this appointment, please contact Central Scheduling at [phone]  Test Name Result Flag Reference   VAS LOWER LIMB ARTERIAL DUPLEX, COMPLETE BILATERAL/GRAFTS (Report)     [facility] REPORT   CLINICAL:   Indications: Patient presents with an ulcer on his left heal x several months   that is not healing  He denies any pain with walking  Risk Factors: The patient has history of Hypertension and Diabetes (IDDM   adult)  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   knee  Operative History   7/6/2015 Agram- wnl      Right Brachial Pressure: 128/ mmHg, Left Brachial Pressure: 130/ mmHg  FINDINGS:      Segment        Rig    Left                        PSV EDV Impression PSV EDV    Common Femoral Artery 109  14        77  13    Prox Profunda     123  0       129  6    Prox SFA        89  12 50-75%   188  32    Mid SFA        116  15 50-75%   178  32    Dist SFA        104  20       123  0    Proximal Pop      113  20       108  18    Distal Pop       106  14        95  0    Dist Post Tibial    48  8               Dist  Ant  Tibial   103  0       100  0    Dist Peroneal                 92  17             CONCLUSION:   Impression:   RIGHT LOWER LIMB:   This resting evaluation shows no evidence of significant lower extremity   arterial occlusive disease  Ankle/Brachial index: 1 3 , Prior 1 1   PVR/ PPG tracings are normal    Metatarsal pressure of 100mmHg   Great toe pressure of 95mmHg, within the healing range for a diabetic  LEFT LOWER LIMB:   This resting evaluation shows evidence of a 50 - 75% stenosis within the   proximal and mid superficial femoral artery  Ankle/Brachial index: 1 3, Prior 0 8   PVR/ PPG tracings are slightly dampened     Metatarsal pressure not obtained- technically difficult   Second toe pressure of 130mmHg, within the healing range      Compared to previous study on 09/09/2016 , there is progression on the left        SIGNATURE:   Electronically Signed by: Suzanne Miller on 2017-07-30 07:56:49 AM

## 2018-01-18 NOTE — PROGRESS NOTES
Chief Complaint  Chief Complaint Free Text Note Form: Follow up visit for left heel wound, new wound to left lower leg and left great toe   Wound Management Chief Complaint [facility]: This patient is an established patient to the [address] [place] [address]      History of Present Illness  Wound Identification HPI:   Wound Identification HPI   Wound #1: left heel    Wound #2: left lower leg    Wound #3: left great toe    Visit Information:   The patient came to Wound Care patient comes up in wheelchair, but patient is ambulatoryand was ambulatory  The patient is being seen for follow-up with RN at the [address] [place] [address]  The patient is accompanied by his Lani Andreyjose  The patient's identification was verified  A secondary verification process was completed  Orientation: oriented to person, oriented to place and oriented to time  7/16/2015: Dressing intact on heel wounds on arrival  Resident of [facility]  7/23/15: Patient arrived to appointment dressings intact  Patient reports that his dressings are being changed as directed  Patient arrived from [facility]  7/30/15: Patient arrived to appointment with dressing intact, strike though drainage noted on left heel with surgical shoe  Foam pad over right heel and right lower leg YULISSA  No complaints reported  8/6/15: Patient arrived to appointment with dressing intact over left heel and right heel and right lower leg YULISSA  No complaints reported  8/13/15: Patient arrives to [facility] with dressings intact, no problems reported  Staff at [facility] changing as per order  8/20/15: Dressing intact on left heel on arrival, green drainage seen today  Patient is now home, sent home Tuesday morning  Reports that the Naty BARR will be seeing him, orders to be sent to Naty Pratt  Edema seen in both lower legs today, patient states he is not elevating his legs  8/27/2015:Patient arrives to [facility] with dressing intact   Patient reports he accidently scratched his right lower leg and there is a new wound there  There is also a new intact blood blister to the right great toe measuring 0 8 by 0 5  [facility] VNA is changing dressing as per order  9/3/15 Patient arrived to the wound management center dressings intact and tubi x2 on the bilateral legs   No complaints   Wolf reports that the patient the patient is bearing full weight on the Left heel , eating poorly , no protein and lots of sugar   Last blood sugar was 350 as per the VNA   Reviewed and discussed with the patient of how his blood sugars have been and the compliance for the non weight bearing   The patient reports his sugars have been good and he has been eating protein for the wound healing  09/10/2015: Patient arrives to [facility] with dressings intact, dressings changed by VNA as per order  9/17/2015: Patient arrives to [facility] with dressing intact and no problems reported  10/1/2015: Dressing intact on left heel, no drainage  Bilateral Tubigrip F intact  States the Glendell Shave has been changing as ordered  10/27/2015: Patient arrives to [facility] with dressing intact for newly reopened wound to left heel  Patient reports he noticed drainage on Friday 10/23/2015 and has kept it covered until today  Patient has been wearing surgical shoes since discharged 10/01/215 and has has been unable to see  since prescription given  11/5/2015: Here for wound care follow-up   Arrives to [facility] with dressing intact  11/12/2015: Here for wound care follow-up 11/19/2015: Here for wound care follow-up  Dressing intact upon arrival and no problems reported  12/3/2015: Here for wound care follow-up  Patient arrived to appointment with dressing dry and intact  Patient reports that [facility] VNA has been coming out every other day for dressing changes  No complaints reported  12/10/2015: Patient arrived to [facility] with dressing dry and intact over left heel which [facility] Sh[facility] VNA has been changing  No complaints reported   12/17/15: Patient arrived to appointment with dressing dry and intact  Patient reports that Chuckie Bansal is changing the dressings  No complaints reported  12/31/2015: Here for wound care follow-up, patient currently at [facility] for rehab status post hospitalization for CHF  No problems reported  01/07/2016: Patient arrives to [facility] with dressing dry and intact followed with surgical shoe  Patient arrived from [facility]  1/21/16: Patient arrived to appointment with dressing dry and intact  Patient reports that he is supposed to be getting discharged from [facility] today or tomorrow  No complaints reported  1/28/2016: Here for wound care follow-up, patient came home from [facility] on Friday, Chuckie Bansal VNA has been changing dressings every other day  Denies any problems  2/4/2016: Here for wound care follow-up, dressing intact on arrival  2/11/16: Patient arrives to [facility] with dressing intact and no problems reported  Chuckie Bansal VNA changing as per orders  2/18/16: Here for wound care follow-up, dressing intact on left foot, double Tubigrip F intact on left lower leg, 2/25/2016: Here for wound care follow-up, dressing intact but covered with water and mud  Simone changes the dressing twice weekly  03/03/2016: Patient arrives to [facility] with dressing intact and no problems reported  03/10/2016: Patient arrives to [facility] with dressing intact and no problems reported  VNA called regarding some missed visits and non-compliance  03/17/2016: Patient arrives to [facility] with dressing intact and no problems reported  VNA changing as per order  3/24/16: Patient here for nurse visit for left heel  Patient arrived with dressing intact, strike through drainage noted, patient reports Chuckie Bansal has been changing dressing  3/31/2016: Here for wound care follow-up, dressing intact, new open area on right lower leg  Chuckie Bansal VNA has been changing every other day  4/7/2016: Here for wound care follow-up, dressing intact on left heel, right lower leg wound is healed  04/14/2016: Patient here for wound care follow up for left heel  Patient arrived to appointment with dressing dry and intact  [facility]  04/21/2016: Patient here for wound care follow up, dressings intact on arrival  Leaving for [place] today  05/05/2016: Patient here for wound care follow up  VNA changing as per orders  Patient believes he scratched himself on right lower leg due to pruritus  Just returned from vacation  5/12/2016: Here for wound care follow-up  5/19/2016: Here for wound care follow-up, dressing on left heel is intact  Wound on left heel is malodorous and presents with tan drainage  BLE appear edematous today  Patient states that last night he did not have his legs elevated because he was playing [employer] and during the day it is hard for to find time to elevate his legs because he is never home  Patient is a  for his friends who do not have cars  Patient arrived without a dressing on RLE wound  5/26/16: Patient here for wound care follow up  VNA changing as per orders  Patient states he cannot afford the global pedi shoe, present to [facility] with surgical shoe  Right great toe presents with ingrown nail  06/02/2016: Patient here for wound care follow up  Patient arrived to appointment with dressing dry and intact  Reports that [facility] VNA has been changing as directed  No complaints reports  [facility] VNA called about going out to see patient less than daily, also wanted to know about patient being homebound due to patient going out  06/09/2016: Patient here for wound care follow up, dressing intact on arrival, VNA has dropped him because he is not home bound  Sister will be shown how to change the dressing today  6/14/16: Patient is seeing Dr Lexis Saucedo today for Dr Jay Dorsey  6/15/2016: Here for wound care follow-up, dressing intact on arrival, sister has been changing every other day, denies any problem with wound or dressing   States he has been trying harder to stay off his feet 06/23/2016: Patient here for wound care follow up  Dressing intact on left foot, reports his sister changes every other day  New traumatic wound on right lower leg  6/30/16: Here for wound care follow-up, dressing intact on wounds on arrival  Reports friend and sister have been changing every other day  Reports staying off his feet much more this week  7/7/16: Here for wound care follow up, 7/14/2016: Here for wound care follow-up, dressing intact on left heel, refuses Cellutome today  Reports he is unable to stay off his foot  7/21/2016: Here for wound care follow-up, arrived with dressing intact  8/4/2016: Here for wound care follow-up, patient arrived with dressing intact, strike through drainage noted  Patient reports that he forgot to take his insulin last night  8/11/2016: Here for wound care follow-up, dressing intact, sister reports changing every other day  Patient continues oral antibiotic  8/17/2016: Here for wound care follow-up, arrived with dressing intact  No complaints reported  8/25/2016: Here for wound care follow-up, dressing intact on left heel, denies any problem with wound or dressing  9/1/2016: Here for wound care follow-up for L plantar heel wound  Patient arrives using walker but walking on heel  He has a dressing intact to l heel and is wearing a surgical shoe  He reports no pain but is non-adherent with not walking on L heel  He reports walking on heel to go to the bathroom, for meals and to go out  He reports that he does not get "too much" protein in diet- "maybe 1 serving a day"  He drinks diet soda and eats fast foods  Proper nutrition and low sodium foods discussed  9/8/2016: Here for wound care follow-up, dressing intact on arrival, sister along today  Sister reports changing every other day  Reports the wound is larger and appears deeper  9/15/2016: Here for wound care follow-up, dressing intact, strong odor from wound today, wound bed very dark and much larger   Reports being on his foot a lot this week  9/29/16: Here for wound care follow up, patient is currently at [place]  Dressing intact on arrival  10/6/16: Here for wound care follow up, dressing intact on arrival  Moving to [facility] today and will have [facility] VNA  10/13/16: Here for Cellutome procedure today, RLE donor site  Patient arrived from [facility] [address] [facility] [address] with dressing intact, strike through drainage noted  Cellutome LOT# [id number], expiration: 09/2018  10/17/16: Here for nurse visit  Dressing intact on arrival, denies any problem with wound or dressing  10/19/16: Patient is here for wound care follow up, arrived with dressing dry and intact  Patient reports that he is currently back at his house  10/27/16: Patient arrived with dressing intact, reports the VNA has been changing three times a week, denies any problem with wound or dressing  Global pedi shoe intact on left foot  Reports using his hospital bed at home and staying off his feet  11/3/2016: Here for wound care follow-up, dressing intact on arrival, reports VNA has changed as ordered, denies any problems  11/10/2016: Here for wound care follow-up, patient arrived with dressing dry and intact  Patient reports that [facility] is changing dressing as directed  No complaints reported  11/17/2016: Here for wound care follow-up, arrived with dressing dry and intact  No complaints reported  12/1/2016: Here for wound care follow-up  Patient arrived with dressing intact  No complaints reported  12/8/2016: here for wound care follow-up, dressing intact on arrival, reports VNA has been changing twice weekly  global pedi shoe intact on arrival  12/15/2016: Here for wound care follow-up dressing and global pedi shoe intact on arrival  12/29/2016: here for wound care follow-up dressing intact  1/5/17: Here for wound care follow up, arrived with dressing dry and intact  No complaints reported   1/19/17: Here for wound care follow up, arrived with with dressing dry and intact  No complaints reported  patient here for Puraply AM application #1 today Lot#: [id number]  1 2D EXP 9/9/18 1/26/17: Here for wound care follow up, patient arrived with dressing intact  Patient reports that the VNA has only been changing the outer dressing  Steri-strips and optifoam intact  2/2/17: Here for wound care follow up, patient is having Puraply #2 2x2 cm applied today, dressing intact, reports no complaints  PuraPly LOT: [id number]  1 1D Exp: 10/12/18  2/10/17: Here for nurse dressing change  Patient had Puraply #2 applied at last appointment  Patient arrived with dressing intact followed with tubigrips and global pedi shoe  Patient had drainage on tubigrip at E  Patient's left great toe was draining  Akers Fall Scale:     History of Falling: Yes = 25   Secondary Diagnosis: No = 0   Ambulatory Aid Crutches, Shandra Axon = 15   No = 0   Gait: Weak = 10 -- Short steps (may shuffle), stooped but able to lift head while walking, may seek support from furniture while walking, but with light touch (for reassurance)  Mental Status: Overestimates, forgets limitations = 15   Total Score: 65     > 45 = High Risk   Fall, Nutrition, Mobility, Neuropsychological Assessment: The most recent fall occurred Denies any falls since last visit  Nutrition Assessment Screening: Food intake over the last 3 months due to the loss of appetite, digestive problems, chewing or swallowing difficulties is graded as: 2 = no decrease in food intake   Weight loss during the last 3 months: 3 = no weight loss   Mobility scored as: 2 = goes out  Psychological Stress and Acute Disease Scored as: 2 = The patient has not experienced psychological stress or acute disease in the last 3 months  Neuropsychological problems scored as: 2 = no psychological problems  Body Mass Index (BMI) scored as: 3 = BMI 23 or greater  Nutritional Assessment Screening Score: 12 - 14 points - Normal nutritional status       Hospital Based Practices Required Assessment:   Pain Assessment   the patient states they do not have pain  (on a scale of 0 to 10, the patient rates the pain at 0 )   Abuse And Domestic Violence Screen    Yes, the patient is safe at home  The patient states no one is hurting them  Depression And Suicide Screen  No, the patient has not had thoughts of hurting themself  No, the patient has not felt depressed in the past 7 days  Prefered Language is  [place]  Primary Language is  English  Readiness To Learn: Receptive  Barriers To Learning: none  Preferred Learning: demonstration, written and verbal   Education Completed: further treatment/follow-up and treatment/procedure   Teaching Method: verbal, written and demonstration   Person Taught: patient and orders faxed to [facility]   Evaluation Of Learning: verbalized/demonstrated understanding      Review of Systems  ROS Reviewed: Sanguinous      Active Problems    1  Atrial fibrillation (427 31) (I48 91)   2  Cardiomyopathy (425 4) (I42 9)   3  Cellulitis (682 9) (L03 90)   4  Cellulitis of heel, left (682 7) (L03 116)   5  Chronic atrial fibrillation (427 31) (I48 2)   6  Chronic foot ulcer (707 15) (L97 509)   7  Chronic heel ulcer (707 14) (L97 409)   8  Chronic heel ulcer, left, with fat layer exposed (707 14) (L97 422)   9  Chronic ulcer of toe of left foot, limited to breakdown of skin (707 15) (L97 521)   10  Chronic venous hypertension w ulceration, bilateral (459 31) (I87 313)   11  Decubitus ulcer of left heel, stage 3 (703 44,904 85) (R13 705)   12  Diabetes mellitus type 2 with peripheral artery disease (250 70,443 81) (E11 51)   13  Diabetes mellitus type 2, uncontrolled (250 02) (E11 65)   14  Diabetes, polyneuropathy (250 60,357 2) (E11 42)   15  Diabetic ulcer of heel (250 80,707 14) (E11 621,L97 409)   16  Encounter for prostate cancer screening (V76 44) (Z12 5)   17  GERD (gastroesophageal reflux disease) (530 81) (K21 9)   18  Gout (274 9) (M10 9)   19  Hyperlipidemia (272 4) (E78 5)   20  Hypertension (401 9) (I10)   21  Ingrowing toenail (703 0) (L60 0)   22  Mild persistent asthma without complication (889 32) (G07 68)   23  Obesity (278 00) (E66 9)   24  Onychomycosis (110 1) (B35 1)   25  Skin ulcer of right foot with fat layer exposed (707 15) (L97 512)   26  Status post skin graft (V42 3) (Z94 5)   27  Type 2 diabetes mellitus (250 00) (E11 9)   28  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, right (454 0) (I83 019)    Past Medical History    1  History of Cough (786 2) (R05)   2  History of Decubitus ulcer of heel (707 07,707 20) (L89 609)   3  History of Fatigue (780 79) (R53 83)   4  Denied: History of alcohol abuse   5  Denied: History of mental problems   6  History of pain when walking (V13 89) (Z87 898)   7  History of seasonal allergies (V15 09) (Z88 9)   8  Denied: History of substance abuse   9  History of Intermittent claudication (443 9) (I73 9)   10  History of Leg swelling (729 81) (M79 89)   11  History of Neuropathy (355 9) (G62 9)   12  History of Shortness of breath (786 05) (R06 02)   13  History of Varicose veins (454 9) (I86 8)    Surgical History    1  History of Angioplasty W/ Fluorosc Angiography Peripheral Artery Additional   2  History of Full Thickness Skin Graft   3  History of Transcath Intravascular Stent Placement Percutaneous Femoral    Family History    1  Family history of cardiac disorder (V17 49) (Z82 49)   2  Family history of diabetes mellitus (V18 0) (Z83 3)    3  Family history of malignant neoplasm (V16 9) (Z80 9)    4  Denied: Family history of alcohol abuse   5  Denied: Family history of substance abuse    6  FH: mental illness (V17 0) (Z81 8)    7  Family history of cardiac disorder (V17 49) (Z82 49)   8  Family history of diabetes mellitus (V18 0) (Z83 3)   9   Family history of malignant neoplasm (V16 9) (Z80 9)    Social History    · Denied: History of Active advance directive   · Denied: History of Always uses seat belt   · Caffeine use (V49 89) (F15 90)   · [employer] ([address]   ·    · Feels safe at home   · [employer]   · Lives with family   · Never smoker   · No living will   · One child   · Power of  in existence   · Primary language is English   · Rarely consumes alcohol (V49 89) (Z78 9)   · Retired   · Supportive and safe    Current Meds   1  Advair Diskus 250-50 MCG/DOSE Inhalation Aerosol Powder Breath Activated; INHALE 1   PUFF EVERY 12 HOURS Recorded   2  Allopurinol 300 MG Oral Tablet; take 1 tablet every day  Requested for: 77ADG5918; Last   Rx:92Dib9437 Ordered   3  BD Pen Needle Maame U/F 32G X 4 MM Miscellaneous; USE AS DIRECTED 6 TIMES A   DAY; Therapy: 20POT3975 to (Evaluate:09Jan2017)  Requested for: 44Peq2235; Last   Rx:96Qga9208 Ordered   4  Dakins (1/4 strength) 0 125 % External Solution; USE TOPICALLY AS DIRECTED; Therapy: 91BJI4115 to (Last Rx:04Aug2016)  Requested for: 69Phr6802 Ordered   5  Furosemide 40 MG Oral Tablet; 1-2 tabs per day  Requested for: 46ZMI4894; Last   Rx:10Feb2016 Ordered   6  Levemir FlexTouch 100 UNIT/ML Subcutaneous Solution Pen-injector; INJECT 50 UNIT    twice a  day; Therapy: 21MWF6153 to (DVRWKMOO:78OFQ7315)  Requested for: 78DOP7118; Last   Rx:01Feb2017 Ordered   7  Lidocaine HCl - 4 % External Solution; USE AS DIRECTED  As at [facility] to wounds prior to   debridement prn pain; Therapy: (Recorded:18Feb2016) to Recorded   8  Losartan Potassium 50 MG Oral Tablet; Take 1 tablet daily; Therapy: 30JCN5477 to (Evaluate:11Feb2017)  Requested for: 38IEM1007; Last   Rx:17Feb2016 Ordered   9  Metoprolol Tartrate 25 MG Oral Tablet; take 1 tablet by mouth every 12 hours; Therapy: 11DLT4700 to [phone])  Requested for: 64ENO9136; Last   Rx:03Nov2016 Ordered   10  NovoLOG FlexPen 100 UNIT/ML Subcutaneous Solution Pen-injector; 20 units sq tid ac; Therapy: 12UNR1934 to (Evaluate:15Oct2016)  Requested for: 78LXD6706;  Last Rx: 21Oct2015 Ordered   11  OneTouch Delica Lancets Fine Miscellaneous; check BS 4xday; Therapy: 09GBW1209 to (Evaluate:22Jan2016)  Requested for: 79SHV2300; Last    Rx:27Jan2015 Ordered   12  OneTouch Ultra Blue In [employer]; check 4/day; Therapy: 44LCI2573 to (Evaluate:63Tyt6844)  Requested for: 92WVD1820; Last    Rx:17Jan2017 Ordered    Allergies    1  No Known Drug Allergies    Physical Exam    Wound #1 Assessment wound #1 Location:, left heel, Care for this wound started on 10/27/2016  Wound Status: not healed  Diabetic Ulcer Grade/Lower Extremity: Gutiérrez 1  Length: 1 7cm x Width: 0 8cm x Depth: 0 2cm   Total: 1 36sq cm   Wound Volume: 0 272cm3           Tissue type: Granulation and Slough   Color of Wound: Yellow - 10%   Exudate Amount: Moderate   Exudate Type: Serosangiunous   Odor: None   Wound Edges: Callous and Epidermal Migration   Periwound Skin Condition: Macerated, Callus, Scaly        Wound #2 Assessment wound #2 Location:, left lower leg (2 areas), Care for this wound started on 2/10/17  Wound Status: not healed  Venous Ulcer: Full Thickness  Length: 1 4cm x Width: 0 4cm x Depth: 0 1cm   Total: 0 56sq cm   Wound Volume: 0 056cm3           Tissue type: Granulation   Color of Wound: Pink - 100%   Exudate Amount: Moderate   Exudate Type: Serous   Odor: None   Exudate Color: Yellow   Wound Edges: Intact   Periwound Skin Condition: Hemosiderin pigmentation, Scaly, Edema        Wound #3 Assessment wound #3 Location:, left great toe  Care for this wound started on 2/10/17   Wound Status: not healed  Diabetic Ulcer Grade/Lower Extremity: Gutiérrez 1     Length: 0 8cm x Width: 0 2cm x Depth: 0 2cm   Total: 0 16sq cm   Wound Volume: 0 032cm3           Tissue type: Granulation and Eschar   Color of Wound: Red - 90% and Black - 10%   Exudate Amount: Minimal   Odor: None   Wound Edges: Callous   Periwound Skin Condition: Callus         Procedure      Wound #1: left heel     Nurse Dressing Change: Wound #1 The wound located on the left heel   Wound care rendered as per Physician/Advanced Practitioner order/plan  Order sent to Home Care  Return to [address] [place] [address] 1 week with Dr Linda Bradford  Comments:   Wound #2: left lower leg     Nurse Dressing Change:   Wound #2 The wound located on the left lower leg  Wound care rendered as per Physician/Advanced Practitioner order/plan  Order sent to Home Care  Comments:   Wound #3: left great toe     Nurse Dressing Change:   Wound #3 The wound located on the left great toe  Wound care rendered as per Physician/Advanced Practitioner order/plan  Order sent to Home Care  Comments:      Wound Drsg  Orders/Instructions  Wound Identification Dressing Orders--Instructions:   Wound Identification and Instructions   Wound #1: left heel    Wound Care Instructions  Discussed with Patient/Caregiver  Dressing Type: Alginate  Wash with mild soap and water, normal saline, wound cleanser  Apply specified dressing to wound base/bed  Secondary dressing apply: ABD  Secure with: Kerlix, tape  Dressing change frequency: Three times per week  Offloading: Global pedi shoe   Apply compression using: Tubigrip G  Wound #2: left lower leg    Wound Care Instructions  Discussed with Patient/Caregiver  Dressing Type: Alginate  Wash with mild soap and water, normal saline, wound cleanser  Apply specified dressing to wound base/bed  Secondary dressing apply: Meplex border  Dressing change frequency: Three times per week   Apply compression using: Tubigrip G  Wound #3: left great toe   Wound Care Instructions  Discussed with Patient/Caregiver  Dressing Type: [place] with mild soap and water, normal saline, wound cleanser  Apply specified dressing to wound base/bed  Secondary dressing apply: Gauze  Secure with: tape  Dressing change frequency: Three times per week  Offloading: Global pedi shoe   Apply compression using: Tubigrip G        Wound Goals  Wound Goals:   Healing Goals:   Fair healing potential, expect slow healing progress secondary to co-morbid conditions and advanced age  Wound edges will appear with evidence of contraction and epithelialization   Patient will achieve full wound closure and return to full ADLs       Impression  [facility]: Wound Nursing Care Plan   Problem: left heel and left great toe wounds   Impaired Tissue Integrity related to: heel ulcer   Imbalanced Nutrition, less than body requirements related to inadequate intake and/or disease process:   Impaired Mobility related to: heel wound, use of walker   Self Care Deficit related to wound dressing changes:   Risk for Unstable Blood Glucose related to increased metabolic demand required for wound healing and/or immune response to wound infection:   Risk for Fall related to criteria noted on Fall Risk Assessment:   Ineffective Adherence to the Plan of Care related to:   Goals   Patient will achieve 100% epithelialization:  Patient will maintain skin integrity:  Patient will demonstrate and verbalize knowledge of their disease process and management:  Patient will verbalize signs and symptoms of infection and when to notify physician or [facility]:    Patient will verbalize knowledge of and demonstrate adherence to interventions to achieve optimal nutrition required for wound healing:  Patient will be able to maintain or increase current ability to perform ADLs: Javed Reaves Patient will not experience a fall:  Patient will demonstrate offloading and body positioning to effectively decrease edema:    Other Goals: patient will increase adherence to plan of care  Wound Nursing Care Interventions:   Provide moist wound healing:  Evaluate current medication regime for medications which may slow/impede wound healing:  Implement offloading measures (turn & reposition schedule/method, ortho shoes/boots, floating heels, crutches, specialty surfaces:     Teach and evaluate effectiveness of offloading measures:  Teach patient and/or family about disease process and management methods:  Assess patient's nutrition on each wound care visit (including protein and fluid intake): Daniella Weaver Educate on diet and hydration required to enhance wound healing:  Assess mobility and how it may affect wound healing:  Assess patient's knowledge of diabetes management:  Complete Fall Risk Assessment upon admission to wound program and with change in condition/implement identified interventions:  Teach patient on edema reducing measures:    Elevate legs above heart 30 minutes 3 times a day, walk 30 minutes daily, reduced sodium diet, diuretics as ordered, wear compression hose or wrap as ordered:  Assess for patient compliance to established plan of care: Daniella Weaver    Ask the patient to verbalize current barriers to care:  12/8/2016 reviewed; reviewed 1/18/17      Future Appointments    Date/Time Provider Specialty Site   02/13/2017 10:00 AM Melina Weinstein MD Family Medicine [place] [address]   02/16/2017 10:45 AM Susan Ventura DPM Wound Care [address]     Signatures   Electronically signed by : Zac Vargas RN; Feb 10 2017  1:12PM EST                       (Author)

## 2018-01-19 ENCOUNTER — GENERIC CONVERSION - ENCOUNTER (OUTPATIENT)
Dept: OTHER | Facility: OTHER | Age: 68
End: 2018-01-19

## 2018-01-21 NOTE — PROGRESS NOTES
Assessment   1  Cardiomyopathy (425 4) (I42 9)   2  Chronic heel ulcer (707 14) (L97 409)   3  Diabetes mellitus type 2 with peripheral artery disease (250 70,443 81) (E11 51)   4  Diabetes mellitus type 2, uncontrolled (250 02) (E11 65)   5  Gout (274 9) (M10 9)    Plan    Gout    · (1) COMPREHENSIVE METABOLIC PANEL; Status:Active; Requested JNQ:[id number];    · (1) HEMOGLOBIN A1C; Status:Active; Requested PDT:[id number];    · (1) NT- BNP (PRO BRAIN NATRIURETIC PEPTIDE); Status:Active; Requested    MHU:[id number];    · (1) URIC ACID; Status:Active; Requested GOX:[id number];   Type 2 diabetes mellitus    · Levemir FlexTouch 100 UNIT/ML Subcutaneous Solution Pen-injector; INJECT    50 UNIT  twice a  day      Type 2 diabetes mellitus (250 00) (E11 9)               Chief Complaint   Pt here for sore throat, SOB  Pt states cant walk far without feeling out of breath  Pt also states headache, body ache and ear pain  History of Present Illness   HPI: co sore throat SOB with walking < 50 ft is limit  ok sleeping one pillow using ventolin inhalor 5-6 times daily      Review of Systems        Constitutional: feeling poorly  ENT: no complaints of earache, no loss of hearing, no nosebleeds or nasal discharge, no sore throat or hoarseness  Cardiovascular: lower extremity edema  Respiratory: cough-- and-- shortness of breath during exertion  Gastrointestinal: no complaints of abdominal pain, no constipation, no nausea or vomiting, no diarrhea or bloody stools  Genitourinary: no complaints of dysuria or incontinence, no hesitancy, no nocturia, no genital lesion, no inadequacy of penile erection  Musculoskeletal: limb swelling  Integumentary: a rash  Active Problems    1  Chronic foot ulcer (707 15) (L97 509)   2  Chronic heel ulcer (707 14) (L97 409)   3  Chronic heel ulcer, left, with fat layer exposed (707 14) (L97 422)   4   Chronic ulcer of toe of left foot, limited to breakdown of skin (707 15) (L97 521)   5  Chronic venous hypertension w ulceration, bilateral (459 31) (I87 313)   6  Decubitus ulcer of left heel, stage 3 (869 87,334 60) (K13 604)   7  Diabetes mellitus type 2 with peripheral artery disease (250 70,443 81) (E11 51)   8  Diabetes mellitus type 2, uncontrolled (250 02) (E11 65)   9  Diabetes, polyneuropathy (250 60,357 2) (E11 42)   10  Diabetic ulcer of heel (250 80,707 14) (E11 621,L97 409)   11  Encounter for prostate cancer screening (V76 44) (Z12 5)   12  Gallstones (574 20) (K80 20)   13  GERD (gastroesophageal reflux disease) (530 81) (K21 9)   14  Gout (274 9) (M10 9)   15  Hyperlipidemia (272 4) (E78 5)   16  Hypertension (401 9) (I10)   17  Mild persistent asthma without complication (157 11) (Y44 14)   18  Obesity (278 00) (E66 9)   19  Onychomycosis (110 1) (B35 1)   20  Peripheral arterial disease (443 9) (I73 9)   21  Screening for genitourinary condition (V81 6) (Z13 89)   22  Shortness of breath (786 05) (R06 02)   23  Status post skin graft (V42 3) (Z94 5)   24  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, right (454 0) (I83 019)   25  Varicose veins of lower extremity with ulcer, left (454 0) (I83 029)      Type 2 diabetes mellitus (250 00) (E11 9)             Chronic atrial fibrillation (427 31) (I48 2)             Cardiomyopathy (425 4) (I42 9)             Blood alkaline phosphatase increased compared with prior measurement (790 5) (R74 8)               Past Medical History   1  History of Cough (786 2) (R05)   2  History of Decubitus ulcer of heel (707 07,707 20) (L89 609)   3  History of Fatigue (780 79) (R53 83)   4  Denied: History of alcohol abuse   5  Denied: History of mental problems   6  History of pain when walking (V13 89) (Z87 898)   7  History of seasonal allergies (V15 09) (Z88 9)   8  Denied: History of substance abuse   9  History of Intermittent claudication (443 9) (I73 9)   10  History of Leg swelling (091 81) (M70 89)   11   History of Neuropathy (355 9) (G62 9)   12  History of Shortness of breath (786 05) (R06 02)   13  History of Varicose veins (454 9) (I83 90)  Active Problems And Past Medical History Reviewed: The active problems and past medical history were reviewed and updated today  Family History   Mother    1  Family history of cardiac disorder (V17 49) (Z82 49)   2  Family history of diabetes mellitus (V18 0) (Z83 3)  Father    3  Family history of malignant neoplasm (V16 9) (Z80 9)  Other    4  Denied: Family history of alcohol abuse   5  Denied: Family history of substance abuse  Unknown    6  FH: mental illness (V17 0) (Z81 8)  Family History    7  Family history of cardiac disorder (V17 49) (Z82 49)   8  Family history of diabetes mellitus (V18 0) (Z83 3)   9  Family history of malignant neoplasm (V16 9) (Z80 9)    Social History    · Denied: History of Active advance directive   · Denied: History of Always uses seat belt   · Caffeine use (V49 89) (F15 90)   · [employer] ([address]   ·    · Feels safe at home   · [employer]   · Lives with family   · SISTER   · Never smoker   · No living will   · One child   · Power of  in existence   · Primary language is English   · Rarely consumes alcohol (V49 89) (Z78 9)   · Retired   · Supportive and safe  The social history was reviewed and updated today  The social history was reviewed and is unchanged  Surgical History   1  History of Angioplasty W/ Fluorosc Angiography Peripheral Artery Additional   2  History of Full Thickness Skin Graft   3  History of Transcath Intravascular Stent Placement Percutaneous Femoral    Current Meds    1  Allopurinol 300 MG Oral Tablet; take 1 tablet by mouth daily; Therapy: 48GCL1643 to (Evaluate:02Jun2018)  Requested for: 98FQK9940; Last     Rx:71Wkd4080 Ordered   2  Allopurinol 300 MG Oral Tablet; Therapy: (Recorded:10Nov2017) to Recorded   3   Basaglar KwikPen 100 UNIT/ML Subcutaneous Solution Pen-injector; 50 UNITS TID; Therapy: 00XIP0189 to (Evaluate:10Dec2017)  Requested for: 87TTY6214; Last     Rx:10Nov2017 Ordered   4  BD Pen Needle Maame U/F 32G X 4 MM Miscellaneous; USE AS DIRECTED 6 TIMES A     DAY; Therapy: 47EBM6604 to (Evaluate:30Oct2017)  Requested for: 21DBT5877; Last     Rx:33Hxi1187 Ordered   5  Breo Ellipta 100-25 MCG/INH Inhalation Aerosol Powder Breath Activated; Therapy: (Recorded:10Nov2017) to Recorded   6  Colchicine 0 6 MG Oral Tablet; Therapy: (Recorded:10Nov2017) to Recorded   7  Dakins (1/4 strength) 0 125 % External Solution; USE TOPICALLY AS DIRECTED; Therapy: 07FOQ5274 to (Last Rx:04Aug2016)  Requested for: 04Aug2016 Ordered   8  Furosemide 80 MG Oral Tablet; TAKE 1 TABLET TWICE DAILY; Therapy: 65NLD8573 to (Rosa Reynaga)  Requested for: 20TGT3622; Last     Rx:10Nov2017 Ordered   9  HumaLOG KwikPen 100 UNIT/ML Subcutaneous Solution Pen-injector; Therapy: (Recorded:10Nov2017) to Recorded   10  Lidocaine HCl - 4 % External Solution; USE AS DIRECTED  As at [facility] to wounds prior      to debridement prn pain; Therapy: (Recorded:18Feb2016) to Recorded   11  Losartan Potassium 50 MG Oral Tablet; take 1 tablet by mouth daily; Therapy: 09XWM5649 to (Evaluate:02Jun2018)  Requested for: 40DIY3986; Last      Rx:04Dec2017 Ordered   12  Metoprolol Tartrate 25 MG Oral Tablet; take 1 tablet by mouth every 12 hours; Therapy: 56LQD9470 to (Evaluate:11Aug2018)  Requested for: 25VLP9161; Last      Rx:26Nwt9117 Ordered   13  OneTouch Delica Lancets Fine Miscellaneous; check BS 4xday; Therapy: 60DUK6768 to (Evaluate:22Jan2016)  Requested for: 85LAD5446; Last      Rx:27Jan2015 Ordered   14  OneTouch Ultra Blue In [employer]; check 4/day; Therapy: 83CQO5909 to (Evaluate:15Aug2017)  Requested for: 38SSZ0972; Last      Rx:17Jan2017 Ordered   15   Ventolin  (90 Base) MCG/ACT Inhalation Aerosol Solution; INHALE 2 PUFFS      EVERY 4 HOURS AS NEEDED; Therapy: 17CSG2337 to (Evaluate:12Kve9861)  Requested for: 63ZPF5791; Last      Rx:17Kny1277 Ordered     The medication list was reviewed and updated today  Allergies   1  No Known Drug Allergies    Vitals    Recorded: 23INY6849 03:55PM   Temperature 97 1 F, Temporal   Heart Rate 68   Respiration 16   Systolic 093, Sitting   Diastolic 90, Sitting   Height 6 ft 3 in   Weight 368 lb 4 oz   BMI Calculated 46 03   BSA Calculated 2 84   Pain Scale 3     Physical Exam        Constitutional      General appearance: No acute distress, well appearing and well nourished  morbidly obese  Ears, Nose, Mouth, and Throat      Otoscopic examination: Tympanic membrance translucent with normal light reflex  Canals patent without erythema  Oropharynx: Normal with no erythema, edema, exudate or lesions  Pulmonary      Auscultation of lungs: Clear to auscultation, equal breath sounds bilaterally, no wheezes, no rales, no rhonci  Cardiovascular      Auscultation of heart: Normal rate and rhythm, normal S1 and S2, without murmurs  Examination of extremities for edema and/or varicosities: Abnormal   bilateral ankle 3+ pitting edema-- and-- bilateral pretibial 2+ pitting edema  Lymphatic      Palpation of lymph nodes in neck: No lymphadenopathy  Skin      Skin and subcutaneous tissue: Abnormal   abnormal color and pigmentation,-- erythema-- and-- dry skin  -- legs with persistent lesions  Future Appointments      Date/Time Provider Specialty Site   02/14/2018 11:20 AM EMEKA Aquino   Cardiology [facility] [address]     Signatures    Electronically signed by : Jm Esparza DO; Jan 20 2018  8:41PM EST                       (Author)

## 2018-01-23 VITALS
HEART RATE: 68 BPM | TEMPERATURE: 97.1 F | DIASTOLIC BLOOD PRESSURE: 90 MMHG | WEIGHT: 315 LBS | SYSTOLIC BLOOD PRESSURE: 150 MMHG | HEIGHT: 75 IN | RESPIRATION RATE: 16 BRPM | BODY MASS INDEX: 39.17 KG/M2

## 2018-01-23 NOTE — RESULT NOTES
Verified Results  (1) URIC ACID 83UMU1491 01:35PM Chelsey Lito     Test Name Result Flag Reference   URIC ACID 6 5 mg/dL  4 0-8 0   Therapeutic target for gout patients: <6 0 mg/dL     (1) COMPREHENSIVE METABOLIC PANEL 97OVP0328 40:60HT [facility]sey Noxilizer     Test Name Result Flag Reference   GLUCOSE 136 mg/dL H 65-99   Fasting reference interval     For someone without known diabetes, a glucose  value >125 mg/dL indicates that they may have  diabetes and this should be confirmed with a  follow-up test    UREA NITROGEN (BUN) 21 mg/dL  7-25   CREATININE 0 99 mg/dL  0 70-1 25   For patients >52years of age, the reference limit  for Creatinine is approximately 13% higher for people  identified as -American  eGFR NON-AFR  AMERICAN 80 mL/min/1 73m2  > OR = 60   eGFR AFRICAN AMERICAN 92 mL/min/1 73m2  > OR = 60   BUN/CREATININE RATIO   3-90   NOT APPLICABLE (calc)   SODIUM 140 mmol/L  135-146   POTASSIUM 4 7 mmol/L  3 5-5 3   CHLORIDE 101 mmol/L     CARBON DIOXIDE 30 mmol/L  20-31   CALCIUM 9 0 mg/dL  8 6-10 3   PROTEIN, TOTAL 7 1 g/dL  6 1-8 1   ALBUMIN 3 6 g/dL  3 6-5 1   GLOBULIN 3 5 g/dL (calc)  1 9-3 7   ALBUMIN/GLOBULIN RATIO 1 0 (calc)  1 0-2 5   BILIRUBIN, TOTAL 0 6 mg/dL  0 2-1 2   ALKALINE PHOSPHATASE 242 U/L H    AST 13 U/L  10-35   ALT 13 U/L  9-46     (Q) NT-PROBNP 16CES3450 01:35PM [place] [facility]il[facility]     Test Name Result Flag Reference   NT proBNP 1783 pg/mL  see note   *** Unable to flag abnormal result(s), please refer      to reference range(s) below:     Reference Range:     50-75 years:         <300 pg/mL: Normal, heart failure unlikely  > or = 900 pg/mL: High probability of heart failure              For patients with CHD, the optimal risk category  cut points for incident HF or CVD death (<253 pg/ml  men, <372 pg/mL women) are based on Carrie et al ,  [facility] 2007; 50:205     For patients with existing HF, the optimal risk  category cut point for HF progression (<300 pg/mL)  is based on Alexis & Minor al , Clin Biochem  2010; 43:1405     HF diagnosis cut points in dyspneic patients are  based on Ezekieli et al , Eur Heart J 2006; 27:330     For <50 years: <300 pg/mL HF unlikely, > or = 450  pg/mL high probability of HF; For 50-75 years: <300 pg/mL HF unlikely, > or = 900  pg/mL high probability of HF;  For >75 years: <300 pg/mL HF unlikely, > or = 1800  pg/mL high probability of HF     (Q) HEMOGLOBIN A1c 13BKH6689 01:35PM Mena Shi   REPORT COMMENT:  FASTING:YES     Test Name Result Flag Reference   HEMOGLOBIN A1c 9 6 % of total Hgb H <5 7   For someone without known diabetes, a hemoglobin A1c  value of 6 5% or greater indicates that they may have   diabetes and this should be confirmed with a follow-up   test      For someone with known diabetes, a value <7% indicates   that their diabetes is well controlled and a value   greater than or equal to 7% indicates suboptimal   control  A1c targets should be individualized based on   duration of diabetes, age, comorbid conditions, and   other considerations  Currently, no consensus exists regarding use of  hemoglobin A1c for diagnosis of diabetes for children

## 2018-01-24 VITALS
RESPIRATION RATE: 16 BRPM | WEIGHT: 315 LBS | SYSTOLIC BLOOD PRESSURE: 140 MMHG | HEIGHT: 75 IN | BODY MASS INDEX: 39.17 KG/M2 | HEART RATE: 64 BPM | DIASTOLIC BLOOD PRESSURE: 80 MMHG | TEMPERATURE: 97.1 F

## 2018-01-24 VITALS — OXYGEN SATURATION: 93 %

## 2018-01-26 ENCOUNTER — OFFICE VISIT (OUTPATIENT)
Dept: FAMILY MEDICINE CLINIC | Facility: CLINIC | Age: 68
End: 2018-01-26
Payer: COMMERCIAL

## 2018-01-26 VITALS
DIASTOLIC BLOOD PRESSURE: 72 MMHG | TEMPERATURE: 97.6 F | HEIGHT: 75 IN | RESPIRATION RATE: 20 BRPM | BODY MASS INDEX: 39.17 KG/M2 | SYSTOLIC BLOOD PRESSURE: 126 MMHG | HEART RATE: 72 BPM | WEIGHT: 315 LBS

## 2018-01-26 DIAGNOSIS — I50.9 CHRONIC CONGESTIVE HEART FAILURE, UNSPECIFIED CONGESTIVE HEART FAILURE TYPE: ICD-10-CM

## 2018-01-26 DIAGNOSIS — I50.32 CHRONIC DIASTOLIC CONGESTIVE HEART FAILURE (HCC): Primary | ICD-10-CM

## 2018-01-26 PROCEDURE — 99214 OFFICE O/P EST MOD 30 MIN: CPT | Performed by: FAMILY MEDICINE

## 2018-01-26 NOTE — PROGRESS NOTES
Assessment/Plan:    No problem-specific Assessment & Plan notes found for this encounter  Diagnoses and all orders for this visit:    Chronic diastolic congestive heart failure (HCC)          Subjective:      Patient ID: Raheel Nelson is a 74 y o  male  Pt here for recheck of heart failure  Feeling better but still with excessive fluid overload  Alk Phos lab report not avail  At this time      Shortness of Breath   Associated symptoms include leg swelling  The following portions of the patient's history were reviewed and updated as appropriate: allergies, current medications, past family history, past medical history, past social history, past surgical history and problem list     Review of Systems   Constitutional: Negative  HENT: Negative  Eyes: Negative  Respiratory: Positive for shortness of breath  Cardiovascular: Positive for leg swelling  Gastrointestinal: Negative  Endocrine: Negative  Genitourinary: Negative  Musculoskeletal: Negative  Allergic/Immunologic: Negative  Neurological: Negative  Hematological: Negative  Psychiatric/Behavioral: Negative  Objective:     Physical Exam   Constitutional: He is oriented to person, place, and time  He appears well-developed and well-nourished  HENT:   Head: Normocephalic  Right Ear: Tympanic membrane, external ear and ear canal normal    Left Ear: Tympanic membrane, external ear and ear canal normal    Mouth/Throat: Oropharynx is clear and moist    Eyes: EOM are normal  Pupils are equal, round, and reactive to light  Neck: No thyromegaly present  Cardiovascular: Normal rate, regular rhythm, normal heart sounds and intact distal pulses  Pulmonary/Chest: Breath sounds normal    Abdominal: Soft  He exhibits no mass  There is no tenderness  Musculoskeletal: He exhibits edema  He exhibits no deformity  Lymphadenopathy:     He has no cervical adenopathy     Neurological: He is alert and oriented to person, place, and time  He has normal reflexes  Skin: Skin is warm and dry  Rash noted  No erythema  Psychiatric: He has a normal mood and affect  Nursing note and vitals reviewed

## 2018-01-27 LAB
ALP BONE CFR SERPL: 19 % (ref 28–66)
ALP INTEST CFR SERPL: 0 % (ref 1–24)
ALP LIVER CFR SERPL: 68 % (ref 25–69)
ALP MACROHEPATIC CFR SERPL: 13 %
ALP PLAC CFR SERPL: 0 %
ALP SERPL-CCNC: 199 U/L (ref 40–115)
BUN SERPL-MCNC: 27 MG/DL (ref 7–25)
BUN/CREAT SERPL: 20 (CALC) (ref 6–22)
CALCIUM SERPL-MCNC: 8.9 MG/DL (ref 8.6–10.3)
CHLORIDE SERPL-SCNC: 98 MMOL/L (ref 98–110)
CO2 SERPL-SCNC: 29 MMOL/L (ref 20–31)
CREAT SERPL-MCNC: 1.35 MG/DL (ref 0.7–1.25)
GLUCOSE SERPL-MCNC: 264 MG/DL (ref 65–99)
POTASSIUM SERPL-SCNC: 4.7 MMOL/L (ref 3.5–5.3)
SL AMB EGFR AFRICAN AMERICAN: 63 ML/MIN/1.73M2
SL AMB EGFR NON AFRICAN AMERICAN: 55 ML/MIN/1.73M2
SODIUM SERPL-SCNC: 138 MMOL/L (ref 135–146)

## 2018-02-02 ENCOUNTER — OFFICE VISIT (OUTPATIENT)
Dept: FAMILY MEDICINE CLINIC | Facility: CLINIC | Age: 68
End: 2018-02-02
Payer: COMMERCIAL

## 2018-02-02 VITALS
HEIGHT: 75 IN | SYSTOLIC BLOOD PRESSURE: 140 MMHG | TEMPERATURE: 98.3 F | RESPIRATION RATE: 16 BRPM | HEART RATE: 73 BPM | WEIGHT: 315 LBS | BODY MASS INDEX: 39.17 KG/M2 | DIASTOLIC BLOOD PRESSURE: 70 MMHG | OXYGEN SATURATION: 94 %

## 2018-02-02 DIAGNOSIS — Z79.4 TYPE 2 DIABETES MELLITUS WITH COMPLICATION, WITH LONG-TERM CURRENT USE OF INSULIN (HCC): ICD-10-CM

## 2018-02-02 DIAGNOSIS — I50.32 CHRONIC DIASTOLIC CONGESTIVE HEART FAILURE (HCC): ICD-10-CM

## 2018-02-02 DIAGNOSIS — M10.022 ACUTE IDIOPATHIC GOUT OF LEFT ELBOW: Primary | ICD-10-CM

## 2018-02-02 DIAGNOSIS — E11.8 TYPE 2 DIABETES MELLITUS WITH COMPLICATION, WITH LONG-TERM CURRENT USE OF INSULIN (HCC): ICD-10-CM

## 2018-02-02 PROCEDURE — 99215 OFFICE O/P EST HI 40 MIN: CPT | Performed by: FAMILY MEDICINE

## 2018-02-02 RX ORDER — PREDNISONE 10 MG/1
TABLET ORAL
Qty: 10 TABLET | Refills: 0 | Status: SHIPPED | OUTPATIENT
Start: 2018-02-02 | End: 2018-03-26 | Stop reason: HOSPADM

## 2018-02-02 NOTE — PROGRESS NOTES
Assessment/Plan:    No problem-specific Assessment & Plan notes found for this encounter  There are no diagnoses linked to this encounter  Subjective:      Patient ID: Jaky Booker is a 74 y o  male  Pt co lt arm pain and swelling worse past week also still difficulty breathing taking over counter vicks cough med says it helps him sleep and breath better        The following portions of the patient's history were reviewed and updated as appropriate: current medications, past family history, past medical history, past social history, past surgical history and problem list     Review of Systems   Constitutional: Negative  HENT: Positive for congestion  Eyes: Negative  Respiratory: Positive for shortness of breath  Cardiovascular: Positive for leg swelling  Gastrointestinal: Negative  Endocrine: Negative  Genitourinary: Negative  Musculoskeletal: Positive for joint swelling  Skin: Positive for color change  Allergic/Immunologic: Negative  Neurological: Negative  Hematological: Negative  Psychiatric/Behavioral: Negative  Objective:     Physical Exam   Constitutional: He is oriented to person, place, and time  He appears well-developed and well-nourished  HENT:   Head: Normocephalic  Right Ear: Tympanic membrane, external ear and ear canal normal    Left Ear: Tympanic membrane, external ear and ear canal normal    Mouth/Throat: Oropharynx is clear and moist    Eyes: EOM are normal  Pupils are equal, round, and reactive to light  Neck: No thyromegaly present  Cardiovascular: Normal rate, regular rhythm, normal heart sounds and intact distal pulses  Pulmonary/Chest: Breath sounds normal    Abdominal: Soft  He exhibits no mass  There is no tenderness  Musculoskeletal: Normal range of motion  He exhibits edema and tenderness  He exhibits no deformity     Lt arm with edema and redness wrist to elbow   Lymphadenopathy:     He has no cervical adenopathy  Neurological: He is alert and oriented to person, place, and time  He has normal reflexes  Skin: Skin is warm and dry  No rash noted  No erythema  Psychiatric: He has a normal mood and affect  Nursing note and vitals reviewed

## 2018-02-14 ENCOUNTER — OFFICE VISIT (OUTPATIENT)
Dept: CARDIOLOGY CLINIC | Facility: CLINIC | Age: 68
End: 2018-02-14
Payer: COMMERCIAL

## 2018-02-14 VITALS
SYSTOLIC BLOOD PRESSURE: 110 MMHG | BODY MASS INDEX: 44.21 KG/M2 | DIASTOLIC BLOOD PRESSURE: 74 MMHG | HEART RATE: 81 BPM | WEIGHT: 315 LBS

## 2018-02-14 DIAGNOSIS — I48.91 ATRIAL FIBRILLATION, UNSPECIFIED TYPE (HCC): ICD-10-CM

## 2018-02-14 DIAGNOSIS — I10 ESSENTIAL (PRIMARY) HYPERTENSION: Primary | ICD-10-CM

## 2018-02-14 DIAGNOSIS — E78.00 PURE HYPERCHOLESTEROLEMIA: ICD-10-CM

## 2018-02-14 PROCEDURE — 99214 OFFICE O/P EST MOD 30 MIN: CPT | Performed by: INTERNAL MEDICINE

## 2018-02-14 RX ORDER — SPIRONOLACTONE 25 MG/1
25 TABLET ORAL DAILY
COMMUNITY
End: 2018-04-16 | Stop reason: SDUPTHER

## 2018-02-14 RX ORDER — FUROSEMIDE 40 MG/1
40 TABLET ORAL DAILY
Refills: 1 | COMMUNITY
Start: 2018-01-24 | End: 2018-02-14 | Stop reason: SDUPTHER

## 2018-02-14 NOTE — PROGRESS NOTES
Cardiology Follow Up    Ld Zhang [facility]  1950  [id number]  [facility]  [address] [place] [address] [address]    1  Essential (primary) hypertension     2  Pure hypercholesterolemia     3  Atrial fibrillation, unspecified type ([facility] )         Interval History:  Patient is here for a follow-up visit  He was last seen by me in August 2017  Since that time he has done well  His most recent echocardiogram was done in March 2016 and demonstrated preserved LV systolic function with left ventricular hypertrophy  He had no significant valvular heart disease  Patient had been hospitalized in October and he was seen by me at that time  He has known chronic atrial fibrillation but has been reluctant and noncompliant with anticoagulant therapy  I did review this issue with him today and he does not appear to have made a decision about this  He had been on Coumadin before and discontinued this  He has remote history of a peptic ulcer bleed but he states this was more than 15 years ago  He is on diuretic therapy and recently had blood work done which showed preserved renal function and a potassium of 4 7      Patient Active Problem List   Diagnosis    COPD (chronic obstructive pulmonary disease) ([facility] )    DM2 (diabetes mellitus, type 2) ([facility] )    Gout    Hypertension    Chronic [facility] fibrillation ([facility]    Morbid obesity ([facility] )    Acute gout    Chronic diastolic congestive heart failure (HCC)     Past Medical History:   Diagnosis Date    CHF (congestive heart failure) (HCC)     COPD (chronic obstructive pulmonary disease) (HCC)     Decubitus ulcer of heel     LAST ASSESSED [id number]    Diabetes mellitus (HCC)     History of varicose veins     Hypertension     Intermittent claudication (HCC)     Neuropathy     Seasonal allergies      Social History     Social History    Marital status:  Spouse name: N/A    Number of children: 1    Years of education: N/A     Occupational History    RETIRED      Social History Main Topics    Smoking status: Never Smoker    Smokeless tobacco: Never Used    Alcohol use Yes      Comment: RARE    Drug use: No    Sexual activity: No     Other Topics Concern    Not on file     Social History Narrative    DENIED [address]    FEELS SAFE AT HOME    LIVES WITH FAMILY - SISTER    NO LIVING WILL    POA IN EXISTENCE     SUPPORTIVE AND SAFE          Family History   Problem Relation Age of Onset    Other Mother      CARDIAC DISORDER     Diabetes Mother     Cancer Father     Other Family      CARDIAC DISORDER     Diabetes Family     Cancer Family     Mental illness Family      Past Surgical History:   Procedure Laterality Date    ANGIOPLASTY      W/ FLUOROSC ANGIOGRAPGY PERIPHERAL ARTERY ADDITIONAL  LAST ASSESSED [id number]    ANGIOPLASTY / STENTING FEMORAL      TANSCATH INTRAVASCULAR STENT PLACEMENT PERCUTANEOUS FEMORAL     FULL THICKNESS SKIN GRAFT      TENDON REPAIR         Current Outpatient Prescriptions:     albuterol (PROVENTIL HFA,VENTOLIN HFA) 90 mcg/act inhaler, Inhale 2 puffs every 6 (six) hours as needed for wheezing , Disp: , Rfl:     allopurinol (ZYLOPRIM) 300 mg tablet, Take 300 mg by mouth daily  , Disp: , Rfl:     fluticasone-salmeterol (ADVAIR) 250-50 mcg/dose inhaler, Inhale 1 puff every 12 (twelve) hours  , Disp: , Rfl:     furosemide (LASIX) 80 mg tablet, Take 1 tablet by mouth 2 (two) times a day, Disp: 60 tablet, Rfl: 0    insulin detemir (LEVEMIR) 100 units/mL subcutaneous injection, Inject 50 Units under the skin 3 (three) times a day with meals  , Disp: , Rfl:     losartan (COZAAR) 50 mg tablet, Take 50 mg by mouth daily  , Disp: , Rfl:     metoprolol tartrate (LOPRESSOR) 25 mg tablet, Take 25 mg by mouth 2 (two) times a day , Disp: , Rfl:     spironolactone (ALDACTONE) 25 mg tablet, Take 25 mg by mouth daily, Disp: , Rfl:     insulin lispro (HumaLOG) 100 units/mL injection, Inject 5 Units under the skin 3 (three) times a day before meals for 30 days, Disp: 3 mL, Rfl: 0    predniSONE 10 mg tablet, One tab bid breakfast and suppertime, Disp: 10 tablet, Rfl: 0  Allergies   Allergen Reactions    Latex Rash       Labs:not applicable  Imaging: No results found  Review of Systems:  Review of Systems   All other systems reviewed and are negative  Physical Exam:  Physical Exam   Constitutional: He is oriented to person, place, and time  He appears well-developed and well-nourished  HENT:   Head: Normocephalic and atraumatic  Eyes: Conjunctivae are normal  Pupils are equal, round, and reactive to light  Neck: Normal range of motion  Neck supple  Cardiovascular: Normal rate and normal heart sounds  Pulmonary/Chest: Effort normal and breath sounds normal    Musculoskeletal: He exhibits edema  Neurological: He is alert and oriented to person, place, and time  Skin: Skin is warm and dry  Psychiatric: He has a normal mood and affect  Vitals reviewed  Discussion/Summary:I will continue the patient's present medical regimen  The patient appears well compensated  I have asked the patient to call if there is a problem in the interim otherwise I will see the patient in six months time

## 2018-02-14 NOTE — PATIENT INSTRUCTIONS
I will continue the patient's present medical regimen  The patient appears well compensated  I have asked the patient to call if there is a problem in the interim otherwise I will see the patient in six months time

## 2018-03-09 ENCOUNTER — TRANSCRIBE ORDERS (OUTPATIENT)
Dept: LAB | Facility: CLINIC | Age: 68
End: 2018-03-09

## 2018-03-09 ENCOUNTER — APPOINTMENT (OUTPATIENT)
Dept: LAB | Facility: CLINIC | Age: 68
End: 2018-03-09
Payer: COMMERCIAL

## 2018-03-09 ENCOUNTER — OFFICE VISIT (OUTPATIENT)
Dept: FAMILY MEDICINE CLINIC | Facility: CLINIC | Age: 68
End: 2018-03-09
Payer: COMMERCIAL

## 2018-03-09 VITALS
WEIGHT: 315 LBS | HEART RATE: 78 BPM | HEIGHT: 74 IN | SYSTOLIC BLOOD PRESSURE: 138 MMHG | RESPIRATION RATE: 16 BRPM | TEMPERATURE: 97.1 F | BODY MASS INDEX: 40.43 KG/M2 | DIASTOLIC BLOOD PRESSURE: 76 MMHG

## 2018-03-09 DIAGNOSIS — M10.9 ACUTE GOUT OF LEFT WRIST, UNSPECIFIED CAUSE: Primary | ICD-10-CM

## 2018-03-09 DIAGNOSIS — M10.9 ACUTE GOUT OF LEFT WRIST, UNSPECIFIED CAUSE: ICD-10-CM

## 2018-03-09 PROBLEM — K80.20 GALLSTONES: Status: ACTIVE | Noted: 2017-07-05

## 2018-03-09 PROBLEM — I83.029: Status: ACTIVE | Noted: 2017-02-16

## 2018-03-09 PROBLEM — L97.929: Status: ACTIVE | Noted: 2017-02-16

## 2018-03-09 PROBLEM — R74.8 BLOOD ALKALINE PHOSPHATASE INCREASED COMPARED WITH PRIOR MEASUREMENT: Status: ACTIVE | Noted: 2017-06-05

## 2018-03-09 LAB — URATE SERPL-MCNC: 5.1 MG/DL (ref 4.2–8)

## 2018-03-09 PROCEDURE — 36415 COLL VENOUS BLD VENIPUNCTURE: CPT

## 2018-03-09 PROCEDURE — 84550 ASSAY OF BLOOD/URIC ACID: CPT

## 2018-03-09 PROCEDURE — 99213 OFFICE O/P EST LOW 20 MIN: CPT | Performed by: NURSE PRACTITIONER

## 2018-03-09 RX ORDER — METHYLPREDNISOLONE 4 MG/1
TABLET ORAL
Qty: 21 TABLET | Refills: 0 | Status: SHIPPED | OUTPATIENT
Start: 2018-03-09 | End: 2018-03-26 | Stop reason: HOSPADM

## 2018-03-09 RX ORDER — COLCHICINE 0.6 MG/1
1.2 TABLET ORAL DAILY
Qty: 60 TABLET | Refills: 0 | Status: SHIPPED | OUTPATIENT
Start: 2018-03-09 | End: 2018-09-17 | Stop reason: CLARIF

## 2018-03-09 NOTE — PROGRESS NOTES
Chief Complaint   Patient presents with    Gout Pain     Pt here for GOUT on left hand  Assessment/Plan:    Gout  Patient currently taking allopurinol daily  He has had two gout flares in the last month        Diagnoses and all orders for this visit:    Acute gout of left wrist, unspecified cause  -     colchicine (COLCRYS) 0 6 mg tablet; Take 2 tablets (1 2 mg total) by mouth daily Two tablets today  Then one tablet an hour later  Then take one tablet daily   -     Uric acid; Future  -     Uric acid; Future  -     Methylprednisolone 4 MG TBPK; Use as directed on package          Subjective:      Patient ID: Christo Schultz is a 74 y o  male  Patient states he has another acute flare of gout in his left hand  He states the pain has become worse in the last 3 days  The following portions of the patient's history were reviewed and updated as appropriate: allergies, current medications, past family history, past medical history, past social history, past surgical history and problem list     Review of Systems   Musculoskeletal: Positive for joint swelling (left wrist)  Objective:      /76 (BP Location: Right arm, Patient Position: Sitting, Cuff Size: Adult)   Pulse 78   Temp (!) 97 1 °F (36 2 °C) (Temporal)   Resp 16   Ht 6' 2 2" (1 885 m)   Wt (!) 162 kg (358 lb)   BMI 45 72 kg/m²          Physical Exam   Constitutional: He appears well-developed and well-nourished  Cardiovascular: Normal rate, regular rhythm and S2 normal     Pulmonary/Chest: Effort normal and breath sounds normal    Musculoskeletal:        Left wrist: He exhibits decreased range of motion and swelling

## 2018-03-09 NOTE — PATIENT INSTRUCTIONS
Gout   AMBULATORY CARE:   Gout  is a form of arthritis that causes severe joint pain and stiffness  Acute gout pain starts suddenly, gets worse quickly, and stops on its own  Acute gout can become chronic and cause permanent damage to your joints  Seek care immediately if:   · You have severe pain in one or more of your joints that you cannot tolerate  · You have a fever or redness that spreads beyond the joint area  Contact your healthcare provider if:   · You have new symptoms, such as a rash, after you start gout treatment  · Your joint pain and swelling do not go away, even after treatment  · You are not urinating as much or as often as you usually do  · You have trouble taking your gout medicines  · You have questions or concerns about your condition or care  Stages of gout:   · Hyperuricemia  starts with high levels of uric acid  Hyperuricemia is not gout, but it increases your risk for gout  You may have no symptoms at this stage, and it usually does not need treatment  · Acute gouty arthritis  starts with a sudden attack of pain and swelling, usually in 1 joint  The attack may last from a few days to 2 weeks  · Intercritical gout  is the time between attacks  You may go months or years without another attack  You will not have joint pain or stiffness, but this does not mean your gout is cured  You will still need treatment to prevent chronic gout  · Chronic tophaceous gout  develops if gout is not treated  Large amounts of uric acid crystals, called tophi, collect around your joints  The crystals can destroy or deform the joints  Gout attacks occur more often, and last hours to weeks  More than 1 joint may be painful and swollen  At this stage, gout symptoms do not go away on their own  Treatment  can make your symptoms stop sooner, prevent attacks, and decrease your risk of joint damage   You may need any of the following:  · Medicines:      ¨ NSAIDs , such as ibuprofen, help decrease swelling, pain, and fever  This medicine is available with or without a doctor's order  NSAIDs can cause stomach bleeding or kidney problems in certain people  If you take blood thinner medicine, always ask your healthcare provider if NSAIDs are safe for you  Always read the medicine label and follow directions  ¨ Gout medicine  decreases joint pain and swelling  It may also be given to prevent new gout attacks  ¨ Steroids  reduce inflammation and can help your joint stiffness and pain during gout attacks  ¨ Uric acid medicine  may be given to reduce the amount of uric acid your body makes  Some medicines may help you pass more uric acid when you urinate  ¨ Take your medicine as directed  Contact your healthcare provider if you think your medicine is not helping or if you have side effects  Tell him or her if you are allergic to any medicine  Keep a list of the medicines, vitamins, and herbs you take  Include the amounts, and when and why you take them  Bring the list or the pill bottles to follow-up visits  Carry your medicine list with you in case of an emergency  · Surgery  called a bone graft may be needed for tophi that are painful or infected  Bone in the joint may be replaced with bone taken from another place in your body  Ask your healthcare provider for more information about bone graft surgery  Manage gout:   · Rest your painful joint so it can heal   Your healthcare provider may recommend crutches or a walker if the affected joint is in a leg  · Apply ice to your joint  Ice decreases pain and swelling  Use an ice pack, or put crushed ice in a plastic bag  Cover the ice pack or bag with a towel before you apply it to your painful joint  Apply ice for 15 to 20 minutes every hour, or as directed  · Elevate your joint  Elevation helps reduce swelling and pain  Raise your joint above the level of your heart as often as you can   Prop your painful joint on pillows to keep it above your heart comfortably  · Go to physical therapy if directed  A physical therapist can teach you exercises to improve flexibility and range of motion  Prevent gout attacks:   · Do not eat high-purine foods  These foods include meats, seafood, asparagus, spinach, cauliflower, and some types of beans  Healthcare providers may tell you to eat more low-fat milk products, such as yogurt  Milk products may decrease your risk for gout attacks  Vitamin C and coffee may also help  Your healthcare provider or dietitian can help you create a meal plan  · Drink more liquids as directed  Liquids such as water help remove uric acid from your body  Ask how much liquid to drink each day and which liquids are best for you  · Manage your weight  Weight loss may decrease the amount of uric acid in your body  Exercise can help you lose weight  Talk to your healthcare provider about the best exercises for you  · Control your blood sugar level if you have diabetes  Keep your blood sugar level in a normal range  This can help prevent gout attacks  · Limit or do not drink alcohol as directed  Alcohol can trigger a gout attack  Alcohol also increases your risk for dehydration  Ask your healthcare provider if alcohol is safe for you  Follow up with your healthcare provider as directed:  Write down your questions so you remember to ask them during your visits  © 2017 [address] Information is for End User's use only and may not be sold, redistributed or otherwise used for commercial purposes  All illustrations and images included in CareNotes® are the copyrighted property of [employer]  or [employer]  The above information is an  only  It is not intended as medical advice for individual conditions or treatments  Talk to your doctor, nurse or pharmacist before following any medical regimen to see if it is safe and effective for you

## 2018-03-14 ENCOUNTER — TELEPHONE (OUTPATIENT)
Dept: CARDIOLOGY CLINIC | Facility: CLINIC | Age: 68
End: 2018-03-14

## 2018-03-15 ENCOUNTER — TELEPHONE (OUTPATIENT)
Dept: FAMILY MEDICINE CLINIC | Facility: CLINIC | Age: 68
End: 2018-03-15

## 2018-03-15 NOTE — TELEPHONE ENCOUNTER
Severiano SUTTON from home [facility] called with concerns  Pt has been non adherent with taking his medications, following his diabetic diet, and checking his blood sugars  She made him check his bs while there and it was 379  When she was there at 6:30pm he hadn't eaten all day  He has not been doing well with his CHF he's been short of breath and lives with his sister who smokes extensively and is also on oxygen while smoking  She found a novolog pen that he hasn't had refilled since 2015 that still has needles in it  He is not taking his Levemir as he should be either  Stated his wound has finally healed but hasn't been back to see Dr Janice Rizzo since  He has massive edema b/l legs and they are all scratched up and she can not get pedal pulses due to the edema  She said his toes are crusted and not sure if it is just a hygiene issue  Reina Avila also reached out to his cardiac doctor with her concerns of not taking his coumadin and cholesterol med  For over a yr  She did say she did a mini cognitive exam on him and he passed the test  She asked for you to give her a call back and leave her a message if she doesn't answer b/c she will also be seeing patients tomorrow

## 2018-03-23 ENCOUNTER — HOSPITAL ENCOUNTER (INPATIENT)
Facility: HOSPITAL | Age: 68
LOS: 3 days | Discharge: NURSING, REHAB OR LONG-TERM CARE | DRG: 623 | End: 2018-03-26
Attending: FAMILY MEDICINE | Admitting: FAMILY MEDICINE
Payer: COMMERCIAL

## 2018-03-23 ENCOUNTER — APPOINTMENT (EMERGENCY)
Dept: RADIOLOGY | Facility: HOSPITAL | Age: 68
DRG: 623 | End: 2018-03-23
Payer: COMMERCIAL

## 2018-03-23 ENCOUNTER — TELEPHONE (OUTPATIENT)
Dept: FAMILY MEDICINE CLINIC | Facility: CLINIC | Age: 68
End: 2018-03-23

## 2018-03-23 DIAGNOSIS — E66.01 MORBID OBESITY (HCC): ICD-10-CM

## 2018-03-23 DIAGNOSIS — J44.9 CHRONIC OBSTRUCTIVE PULMONARY DISEASE, UNSPECIFIED COPD TYPE (HCC): ICD-10-CM

## 2018-03-23 DIAGNOSIS — I42.9 CARDIOMYOPATHY, UNSPECIFIED TYPE (HCC): ICD-10-CM

## 2018-03-23 DIAGNOSIS — B35.1 ONYCHOMYCOSIS: ICD-10-CM

## 2018-03-23 DIAGNOSIS — I48.20 CHRONIC ATRIAL FIBRILLATION (HCC): ICD-10-CM

## 2018-03-23 DIAGNOSIS — IMO0002 DIABETES MELLITUS TYPE 2, UNCONTROLLED: ICD-10-CM

## 2018-03-23 DIAGNOSIS — E11.42: ICD-10-CM

## 2018-03-23 DIAGNOSIS — R26.2 AMBULATORY DYSFUNCTION: Primary | ICD-10-CM

## 2018-03-23 DIAGNOSIS — R73.9 HYPERGLYCEMIA: ICD-10-CM

## 2018-03-23 DIAGNOSIS — IMO0002 UNCONTROLLED TYPE 2 DIABETES MELLITUS WITH COMPLICATION, WITH LONG-TERM CURRENT USE OF INSULIN: ICD-10-CM

## 2018-03-23 DIAGNOSIS — L97.509 FOOT ULCER (HCC): ICD-10-CM

## 2018-03-23 DIAGNOSIS — I73.9 PVD (PERIPHERAL VASCULAR DISEASE) (HCC): ICD-10-CM

## 2018-03-23 DIAGNOSIS — I50.32 CHRONIC DIASTOLIC CONGESTIVE HEART FAILURE (HCC): Primary | ICD-10-CM

## 2018-03-23 DIAGNOSIS — I10 HYPERTENSION: ICD-10-CM

## 2018-03-23 DIAGNOSIS — L97.409 HEEL ULCER DUE TO SECONDARY DM (HCC): ICD-10-CM

## 2018-03-23 DIAGNOSIS — E13.621 HEEL ULCER DUE TO SECONDARY DM (HCC): ICD-10-CM

## 2018-03-23 DIAGNOSIS — Z91.19 HISTORY OF NONCOMPLIANCE WITH MEDICAL TREATMENT: ICD-10-CM

## 2018-03-23 DIAGNOSIS — N17.9 ACUTE RENAL INJURY (HCC): ICD-10-CM

## 2018-03-23 LAB
ALBUMIN SERPL BCP-MCNC: 3.4 G/DL (ref 3.5–5)
ALP SERPL-CCNC: 194 U/L (ref 46–116)
ALT SERPL W P-5'-P-CCNC: 39 U/L (ref 12–78)
ANION GAP SERPL CALCULATED.3IONS-SCNC: 10 MMOL/L (ref 4–13)
APTT PPP: 28 SECONDS (ref 23–35)
AST SERPL W P-5'-P-CCNC: 26 U/L (ref 5–45)
ATRIAL RATE: 220 BPM
BACTERIA UR QL AUTO: ABNORMAL /HPF
BASOPHILS # BLD AUTO: 0.01 THOUSANDS/ΜL (ref 0–0.1)
BASOPHILS NFR BLD AUTO: 0 % (ref 0–1)
BILIRUB SERPL-MCNC: 0.38 MG/DL (ref 0.2–1)
BILIRUB UR QL STRIP: NEGATIVE
BUN SERPL-MCNC: 33 MG/DL (ref 5–25)
CALCIUM SERPL-MCNC: 9.5 MG/DL (ref 8.3–10.1)
CHLORIDE SERPL-SCNC: 99 MMOL/L (ref 100–108)
CHLORIDE UR-SCNC: 113 MMOL/L (ref 10–330)
CK SERPL-CCNC: 53 U/L (ref 39–308)
CLARITY UR: CLEAR
CO2 SERPL-SCNC: 27 MMOL/L (ref 21–32)
COLOR UR: YELLOW
CREAT SERPL-MCNC: 1.53 MG/DL (ref 0.6–1.3)
CREAT UR-MCNC: 42 MG/DL
CRP SERPL HS-MCNC: 49.75 MG/L
EOSINOPHIL # BLD AUTO: 0.12 THOUSAND/ΜL (ref 0–0.61)
EOSINOPHIL NFR BLD AUTO: 2 % (ref 0–6)
ERYTHROCYTE [DISTWIDTH] IN BLOOD BY AUTOMATED COUNT: 14.5 % (ref 11.6–15.1)
ERYTHROCYTE [DISTWIDTH] IN BLOOD BY AUTOMATED COUNT: 14.5 % (ref 11.6–15.1)
ERYTHROCYTE [SEDIMENTATION RATE] IN BLOOD: 26 MM/HOUR (ref 0–10)
GFR SERPL CREATININE-BSD FRML MDRD: 47 ML/MIN/1.73SQ M
GLUCOSE SERPL-MCNC: 139 MG/DL (ref 65–140)
GLUCOSE SERPL-MCNC: 242 MG/DL (ref 65–140)
GLUCOSE SERPL-MCNC: 310 MG/DL (ref 65–140)
GLUCOSE SERPL-MCNC: 314 MG/DL (ref 65–140)
GLUCOSE SERPL-MCNC: 422 MG/DL (ref 65–140)
GLUCOSE UR STRIP-MCNC: ABNORMAL MG/DL
HCT VFR BLD AUTO: 40.9 % (ref 36.5–49.3)
HCT VFR BLD AUTO: 43.5 % (ref 36.5–49.3)
HGB BLD-MCNC: 13.3 G/DL (ref 12–17)
HGB BLD-MCNC: 14.2 G/DL (ref 12–17)
HGB UR QL STRIP.AUTO: NEGATIVE
INR PPP: 1 (ref 0.86–1.16)
KETONES UR STRIP-MCNC: NEGATIVE MG/DL
LEUKOCYTE ESTERASE UR QL STRIP: ABNORMAL
LYMPHOCYTES # BLD AUTO: 1.41 THOUSANDS/ΜL (ref 0.6–4.47)
LYMPHOCYTES NFR BLD AUTO: 24 % (ref 14–44)
MCH RBC QN AUTO: 28.5 PG (ref 26.8–34.3)
MCH RBC QN AUTO: 28.6 PG (ref 26.8–34.3)
MCHC RBC AUTO-ENTMCNC: 32.5 G/DL (ref 31.4–37.4)
MCHC RBC AUTO-ENTMCNC: 32.6 G/DL (ref 31.4–37.4)
MCV RBC AUTO: 88 FL (ref 82–98)
MCV RBC AUTO: 88 FL (ref 82–98)
MONOCYTES # BLD AUTO: 0.51 THOUSAND/ΜL (ref 0.17–1.22)
MONOCYTES NFR BLD AUTO: 9 % (ref 4–12)
NEUTROPHILS # BLD AUTO: 3.94 THOUSANDS/ΜL (ref 1.85–7.62)
NEUTS SEG NFR BLD AUTO: 65 % (ref 43–75)
NITRITE UR QL STRIP: NEGATIVE
NON-SQ EPI CELLS URNS QL MICRO: ABNORMAL /HPF
NRBC BLD AUTO-RTO: 0 /100 WBCS
NT-PROBNP SERPL-MCNC: 1074 PG/ML
PH UR STRIP.AUTO: 5.5 [PH] (ref 4.5–8)
PLATELET # BLD AUTO: 183 THOUSANDS/UL (ref 149–390)
PLATELET # BLD AUTO: 185 THOUSANDS/UL (ref 149–390)
PLATELET # BLD AUTO: 185 THOUSANDS/UL (ref 149–390)
PMV BLD AUTO: 11.2 FL (ref 8.9–12.7)
POTASSIUM SERPL-SCNC: 4.8 MMOL/L (ref 3.5–5.3)
PROT SERPL-MCNC: 8.1 G/DL (ref 6.4–8.2)
PROT UR STRIP-MCNC: NEGATIVE MG/DL
PROTHROMBIN TIME: 13.2 SECONDS (ref 12.1–14.4)
QRS AXIS: 79 DEGREES
QRSD INTERVAL: 110 MS
QT INTERVAL: 364 MS
QTC INTERVAL: 459 MS
RBC # BLD AUTO: 4.66 MILLION/UL (ref 3.88–5.62)
RBC # BLD AUTO: 4.96 MILLION/UL (ref 3.88–5.62)
RBC #/AREA URNS AUTO: ABNORMAL /HPF
SODIUM SERPL-SCNC: 136 MMOL/L (ref 136–145)
SP GR UR STRIP.AUTO: 1.01 (ref 1–1.03)
T WAVE AXIS: -21 DEGREES
TROPONIN I SERPL-MCNC: <0.02 NG/ML
UROBILINOGEN UR QL STRIP.AUTO: 0.2 E.U./DL
UUN 24H UR-MCNC: 332 MG/DL
VENTRICULAR RATE: 96 BPM
WBC # BLD AUTO: 5.99 THOUSAND/UL (ref 4.31–10.16)
WBC # BLD AUTO: 6.52 THOUSAND/UL (ref 4.31–10.16)
WBC #/AREA URNS AUTO: ABNORMAL /HPF

## 2018-03-23 PROCEDURE — 73610 X-RAY EXAM OF ANKLE: CPT

## 2018-03-23 PROCEDURE — 85027 COMPLETE CBC AUTOMATED: CPT | Performed by: PHYSICIAN ASSISTANT

## 2018-03-23 PROCEDURE — 82948 REAGENT STRIP/BLOOD GLUCOSE: CPT

## 2018-03-23 PROCEDURE — 97163 PT EVAL HIGH COMPLEX 45 MIN: CPT

## 2018-03-23 PROCEDURE — 99285 EMERGENCY DEPT VISIT HI MDM: CPT

## 2018-03-23 PROCEDURE — 85049 AUTOMATED PLATELET COUNT: CPT | Performed by: PHYSICIAN ASSISTANT

## 2018-03-23 PROCEDURE — 87040 BLOOD CULTURE FOR BACTERIA: CPT | Performed by: PHYSICIAN ASSISTANT

## 2018-03-23 PROCEDURE — 80053 COMPREHEN METABOLIC PANEL: CPT | Performed by: PHYSICIAN ASSISTANT

## 2018-03-23 PROCEDURE — 86141 C-REACTIVE PROTEIN HS: CPT | Performed by: PHYSICIAN ASSISTANT

## 2018-03-23 PROCEDURE — 84484 ASSAY OF TROPONIN QUANT: CPT | Performed by: PHYSICIAN ASSISTANT

## 2018-03-23 PROCEDURE — 93010 ELECTROCARDIOGRAM REPORT: CPT | Performed by: INTERNAL MEDICINE

## 2018-03-23 PROCEDURE — 82550 ASSAY OF CK (CPK): CPT | Performed by: PHYSICIAN ASSISTANT

## 2018-03-23 PROCEDURE — 71045 X-RAY EXAM CHEST 1 VIEW: CPT

## 2018-03-23 PROCEDURE — 81001 URINALYSIS AUTO W/SCOPE: CPT | Performed by: INTERNAL MEDICINE

## 2018-03-23 PROCEDURE — 83880 ASSAY OF NATRIURETIC PEPTIDE: CPT | Performed by: PHYSICIAN ASSISTANT

## 2018-03-23 PROCEDURE — 73630 X-RAY EXAM OF FOOT: CPT

## 2018-03-23 PROCEDURE — 82570 ASSAY OF URINE CREATININE: CPT | Performed by: INTERNAL MEDICINE

## 2018-03-23 PROCEDURE — 85025 COMPLETE CBC W/AUTO DIFF WBC: CPT | Performed by: PHYSICIAN ASSISTANT

## 2018-03-23 PROCEDURE — 84540 ASSAY OF URINE/UREA-N: CPT | Performed by: INTERNAL MEDICINE

## 2018-03-23 PROCEDURE — 93005 ELECTROCARDIOGRAM TRACING: CPT | Performed by: PHYSICIAN ASSISTANT

## 2018-03-23 PROCEDURE — 85652 RBC SED RATE AUTOMATED: CPT | Performed by: PHYSICIAN ASSISTANT

## 2018-03-23 PROCEDURE — 97167 OT EVAL HIGH COMPLEX 60 MIN: CPT

## 2018-03-23 PROCEDURE — 85610 PROTHROMBIN TIME: CPT | Performed by: PHYSICIAN ASSISTANT

## 2018-03-23 PROCEDURE — 99222 1ST HOSP IP/OBS MODERATE 55: CPT | Performed by: INTERNAL MEDICINE

## 2018-03-23 PROCEDURE — 85730 THROMBOPLASTIN TIME PARTIAL: CPT | Performed by: PHYSICIAN ASSISTANT

## 2018-03-23 PROCEDURE — G8988 SELF CARE GOAL STATUS: HCPCS

## 2018-03-23 PROCEDURE — 82436 ASSAY OF URINE CHLORIDE: CPT | Performed by: INTERNAL MEDICINE

## 2018-03-23 PROCEDURE — 0JBR0ZZ EXCISION OF LEFT FOOT SUBCUTANEOUS TISSUE AND FASCIA, OPEN APPROACH: ICD-10-PCS | Performed by: PODIATRIST

## 2018-03-23 PROCEDURE — G8978 MOBILITY CURRENT STATUS: HCPCS

## 2018-03-23 PROCEDURE — G8987 SELF CARE CURRENT STATUS: HCPCS

## 2018-03-23 PROCEDURE — G8979 MOBILITY GOAL STATUS: HCPCS

## 2018-03-23 PROCEDURE — 36415 COLL VENOUS BLD VENIPUNCTURE: CPT | Performed by: PHYSICIAN ASSISTANT

## 2018-03-23 RX ORDER — ALLOPURINOL 100 MG/1
100 TABLET ORAL DAILY
Status: DISCONTINUED | OUTPATIENT
Start: 2018-03-24 | End: 2018-03-26 | Stop reason: HOSPADM

## 2018-03-23 RX ORDER — COLCHICINE 0.6 MG/1
1.2 TABLET ORAL DAILY
Status: DISCONTINUED | OUTPATIENT
Start: 2018-03-23 | End: 2018-03-23

## 2018-03-23 RX ORDER — ALBUTEROL SULFATE 90 UG/1
2 AEROSOL, METERED RESPIRATORY (INHALATION) EVERY 6 HOURS PRN
Status: DISCONTINUED | OUTPATIENT
Start: 2018-03-23 | End: 2018-03-26 | Stop reason: HOSPADM

## 2018-03-23 RX ORDER — GINSENG 100 MG
1 CAPSULE ORAL 2 TIMES DAILY
Status: DISCONTINUED | OUTPATIENT
Start: 2018-03-23 | End: 2018-03-26 | Stop reason: HOSPADM

## 2018-03-23 RX ORDER — COLCHICINE 0.6 MG/1
0.6 TABLET ORAL DAILY
Status: DISCONTINUED | OUTPATIENT
Start: 2018-03-24 | End: 2018-03-23

## 2018-03-23 RX ORDER — FUROSEMIDE 10 MG/ML
40 INJECTION INTRAMUSCULAR; INTRAVENOUS
Status: DISCONTINUED | OUTPATIENT
Start: 2018-03-23 | End: 2018-03-23

## 2018-03-23 RX ORDER — ALLOPURINOL 300 MG/1
300 TABLET ORAL DAILY
Status: DISCONTINUED | OUTPATIENT
Start: 2018-03-23 | End: 2018-03-23

## 2018-03-23 RX ORDER — ONDANSETRON 2 MG/ML
4 INJECTION INTRAMUSCULAR; INTRAVENOUS EVERY 6 HOURS PRN
Status: DISCONTINUED | OUTPATIENT
Start: 2018-03-23 | End: 2018-03-26 | Stop reason: HOSPADM

## 2018-03-23 RX ORDER — SODIUM HYPOCHLORITE 2.5 MG/ML
1 SOLUTION TOPICAL DAILY
Status: DISCONTINUED | OUTPATIENT
Start: 2018-03-24 | End: 2018-03-26 | Stop reason: HOSPADM

## 2018-03-23 RX ORDER — HEPARIN SODIUM 5000 [USP'U]/ML
5000 INJECTION, SOLUTION INTRAVENOUS; SUBCUTANEOUS EVERY 8 HOURS SCHEDULED
Status: DISCONTINUED | OUTPATIENT
Start: 2018-03-23 | End: 2018-03-26 | Stop reason: HOSPADM

## 2018-03-23 RX ORDER — LOSARTAN POTASSIUM 50 MG/1
50 TABLET ORAL DAILY
Status: DISCONTINUED | OUTPATIENT
Start: 2018-03-23 | End: 2018-03-23

## 2018-03-23 RX ORDER — SPIRONOLACTONE 25 MG/1
25 TABLET ORAL DAILY
Status: DISCONTINUED | OUTPATIENT
Start: 2018-03-23 | End: 2018-03-23

## 2018-03-23 RX ORDER — ACETAMINOPHEN 325 MG/1
650 TABLET ORAL EVERY 6 HOURS PRN
Status: DISCONTINUED | OUTPATIENT
Start: 2018-03-23 | End: 2018-03-26 | Stop reason: HOSPADM

## 2018-03-23 RX ORDER — MAGNESIUM HYDROXIDE/ALUMINUM HYDROXICE/SIMETHICONE 120; 1200; 1200 MG/30ML; MG/30ML; MG/30ML
15 SUSPENSION ORAL EVERY 6 HOURS PRN
Status: DISCONTINUED | OUTPATIENT
Start: 2018-03-23 | End: 2018-03-26 | Stop reason: HOSPADM

## 2018-03-23 RX ADMIN — FUROSEMIDE 40 MG: 10 INJECTION, SOLUTION INTRAMUSCULAR; INTRAVENOUS at 15:07

## 2018-03-23 RX ADMIN — SPIRONOLACTONE 25 MG: 25 TABLET, FILM COATED ORAL at 08:02

## 2018-03-23 RX ADMIN — HEPARIN SODIUM 5000 UNITS: 5000 INJECTION, SOLUTION INTRAVENOUS; SUBCUTANEOUS at 05:48

## 2018-03-23 RX ADMIN — HEPARIN SODIUM 5000 UNITS: 5000 INJECTION, SOLUTION INTRAVENOUS; SUBCUTANEOUS at 15:07

## 2018-03-23 RX ADMIN — INSULIN LISPRO 8 UNITS: 100 INJECTION, SOLUTION INTRAVENOUS; SUBCUTANEOUS at 12:06

## 2018-03-23 RX ADMIN — Medication 1 SMALL APPLICATION: at 15:07

## 2018-03-23 RX ADMIN — INSULIN LISPRO 4 UNITS: 100 INJECTION, SOLUTION INTRAVENOUS; SUBCUTANEOUS at 18:00

## 2018-03-23 RX ADMIN — INSULIN LISPRO 8 UNITS: 100 INJECTION, SOLUTION INTRAVENOUS; SUBCUTANEOUS at 09:32

## 2018-03-23 RX ADMIN — INSULIN LISPRO 15 UNITS: 100 INJECTION, SOLUTION INTRAVENOUS; SUBCUTANEOUS at 18:00

## 2018-03-23 RX ADMIN — FLUTICASONE PROPIONATE AND SALMETEROL 1 PUFF: 50; 250 POWDER RESPIRATORY (INHALATION) at 08:01

## 2018-03-23 RX ADMIN — INSULIN LISPRO 15 UNITS: 100 INJECTION, SOLUTION INTRAVENOUS; SUBCUTANEOUS at 12:06

## 2018-03-23 RX ADMIN — Medication 1 SMALL APPLICATION: at 18:01

## 2018-03-23 RX ADMIN — INSULIN DETEMIR 50 UNITS: 100 INJECTION, SOLUTION SUBCUTANEOUS at 20:59

## 2018-03-23 RX ADMIN — FLUTICASONE PROPIONATE AND SALMETEROL 1 PUFF: 50; 250 POWDER RESPIRATORY (INHALATION) at 20:59

## 2018-03-23 RX ADMIN — INSULIN DETEMIR 50 UNITS: 100 INJECTION, SOLUTION SUBCUTANEOUS at 08:01

## 2018-03-23 RX ADMIN — METOPROLOL TARTRATE 25 MG: 25 TABLET ORAL at 18:01

## 2018-03-23 RX ADMIN — METOPROLOL TARTRATE 25 MG: 25 TABLET ORAL at 08:02

## 2018-03-23 RX ADMIN — VANCOMYCIN HYDROCHLORIDE 1750 MG: 1 INJECTION, POWDER, LYOPHILIZED, FOR SOLUTION INTRAVENOUS at 04:50

## 2018-03-23 RX ADMIN — LOSARTAN POTASSIUM 50 MG: 50 TABLET, FILM COATED ORAL at 08:01

## 2018-03-23 RX ADMIN — VANCOMYCIN HYDROCHLORIDE 1750 MG: 1 INJECTION, POWDER, LYOPHILIZED, FOR SOLUTION INTRAVENOUS at 16:11

## 2018-03-23 RX ADMIN — ALLOPURINOL 300 MG: 300 TABLET ORAL at 08:01

## 2018-03-23 RX ADMIN — COLCHICINE 1.2 MG: 0.6 TABLET, FILM COATED ORAL at 08:02

## 2018-03-23 RX ADMIN — FUROSEMIDE 40 MG: 10 INJECTION, SOLUTION INTRAMUSCULAR; INTRAVENOUS at 05:48

## 2018-03-23 RX ADMIN — HEPARIN SODIUM 5000 UNITS: 5000 INJECTION, SOLUTION INTRAVENOUS; SUBCUTANEOUS at 21:00

## 2018-03-23 NOTE — CONSULTS
Consult - Podiatry   Neetu Kailee Cummins 74 y o  male MRN: [id number]  Unit/Bed#: [facility] 68 2 -01 Encounter: [id number]    Assessment/Plan     Assessment:  1  Ulceration of the left plantar heel 2/2 DM with neuropathy, richard stage 2  2  Venous stasis  3  Cellulitis  4  DM type II  5  Medical noncompliance  6  Excoriations of the anterior legs    Plan:  - After verbal consent was obtained attention was directed to the left plantar heel where a 15 blade was utilized to excisionally debride the ulceration to bleeding viable subcutaneous tissue with removal of nonviable skin and tissue, slough, and hyperkeratotic tissue  - Ulceration appears superficial with no deep probe, will obtain X-rays to r/o bony involvement  - Majority of erythema is likely due to chronic venous stasis changes  - Dressed with maxsorb, dsd  - encourage offloading of heels in bed with prevalons  - pt is nonweight bearing to the left heel    History of Present Illness     HPI:  Kenya Kat is a 74 y o  male who presents with a heel ulceration which he states has been there for two weeks  He has had issues with this ulceration on and off for two years  Most recently he was discharged from wound care with Dr Shaggy Najera two months ago after this are was healed  He was unable to get his diabetic shoes  He noticed about two weeks ago there there was some ooze from the bottom of his foot  This got progressively worse with increasing malodor and drainage  States that the erythema on his leg did not change much prior to admission  States that prior to admission he was having diarrhea  Denies other systemic symptoms  Also admits to scratching and picking the excoriated areas on the pretibial surface  Consults  Review of Systems   Constitutional: Negative  HENT: Negative  Eyes: Negative  Respiratory: Negative  Cardiovascular: Negative  Gastrointestinal: Negative      Musculoskeletal: neg   Skin:as above   Neurological: Negative  Psych: negative         Historical Information   Past Medical History:   Diagnosis Date    [facility]congestive [facility] failure) ([facility] )     [facility]     Decubitus ulcer of heel     LAST ASSESSED [id number]    Diabetes mellitus ([facility] )     History of varicose veins     Hypertension     Intermittent claudication (HCC)     Neuropathy     Seasonal allergies      Past Surgical History:   Procedure Laterality Date    ANGIOPLASTY      W/ FLUOROSC ANGIOGRAPGY PERIPHERAL ARTERY ADDITIONAL  LAST ASSESSED [id number]    ANGIOPLASTY / STENTING FEMORAL      TANSCATH INTRAVASCULAR STENT PLACEMENT PERCUTANEOUS FEMORAL     FULL THICKNESS SKIN GRAFT      TENDON REPAIR       Social History   History   Alcohol Use    Yes     Comment: RARE     History   Drug Use No     History   Smoking Status    Never Smoker   Smokeless Tobacco    Never Used     Family History:   Family History   Problem Relation Age of Onset    Other Mother      CARDIAC DISORDER     Diabetes Mother     Cancer Father     Other Family      CARDIAC DISORDER     Diabetes Family     Cancer Family     Mental illness Family        Meds/Allergies   Prescriptions Prior to Admission   Medication    albuterol (PROVENTIL HFA,VENTOLIN HFA) 90 mcg/act inhaler    allopurinol (ZYLOPRIM) 300 mg tablet    colchicine (COLCRYS) 0 6 mg tablet    fluticasone-salmeterol (ADVAIR) 250-50 mcg/dose inhaler    furosemide (LASIX) 80 mg tablet    insulin detemir (LEVEMIR) 100 units/mL subcutaneous injection    insulin lispro (HumaLOG) 100 units/mL injection    losartan (COZAAR) 50 mg tablet    Methylprednisolone 4 MG TBPK    metoprolol tartrate (LOPRESSOR) 25 mg tablet    predniSONE 10 mg tablet    spironolactone (ALDACTONE) 25 mg tablet     Allergies   Allergen Reactions    Latex Rash       Objective   First Vitals:   Blood Pressure: 148/84 (03/23/18 0128)  Pulse: 85 (03/23/18 0128)  Temperature: (!) 97 4 °F (36 3 °C) (03/23/18 0128)  Temp Source: Oral (03/23/18 0128)  Respirations: 18 (03/23/18 0128)  Weight - Scale: (!) 158 kg (348 lb) (03/23/18 0128)  SpO2: 96 % (03/23/18 0128)    Current Vitals:   Blood Pressure: 128/60 (03/23/18 0727)  Pulse: 80 (03/23/18 0727)  Temperature: 98 5 °F (36 9 °C) (03/23/18 0727)  Temp Source: Tympanic (03/23/18 0727)  Respirations: 18 (03/23/18 0727)  Weight - Scale: (!) 158 kg (348 lb) (03/23/18 0128)  SpO2: 94 % (03/23/18 0727)        /60 (BP Location: Left arm)   Pulse 80   Temp 98 5 °F (36 9 °C) (Tympanic)   Resp 18   Wt (!) 158 kg (348 lb)   SpO2 94%   BMI 44 44 kg/m²      General Appearance:    Alert, cooperative, no distress   Head:    Normocephalic, without obvious abnormality, atraumatic   Eyes:    PERRL, conjunctiva/corneas clear, EOM's intact        Nose:   Moist mucous membranes   Neck:   Supple, symmetrical, trachea midline   Back:     Symmetric   Lungs:     Respirations unlabored   Heart:    Regular rate and rhythm, S1 and S2 normal, no murmur, rub   or gallop   Abdomen:     Soft, non-tender   Extremities:   MMT is 5/5 to the b/l LE   Pulses:   Pulses are lightly palpable DP and PT   Skin:   There are chronic venous stasis changes, thickening of the skin, lichenification to the b/l distal 1/3 of the legs  There are excoriations on the anterior tibial surface 2/2 itching, with sanguinous drainage  Ulceration to the plantar left heel measuring 6 5x5 4x0 2cm after debridement  The base is 80% granular and 20% fibrotic  +malodor  No ascending erythema  No TTP   Neurologic:   Gross sensation is intact  Protective sensation is absent             Lab Results:   Admission on 03/23/2018   Component Date Value    WBC 03/23/2018 5 99     RBC 03/23/2018 4 96     Hemoglobin 03/23/2018 14 2     Hematocrit 03/23/2018 43 5     MCV 03/23/2018 88     MCH 03/23/2018 28 6     MCHC 03/23/2018 32 6     RDW 03/23/2018 14 5     MPV 03/23/2018 11 2     Platelets 95/24/9142 183     nRBC 03/23/2018 0     Neutrophils Relative 03/23/2018 65     Lymphocytes Relative 03/23/2018 24     Monocytes Relative 03/23/2018 9     Eosinophils Relative 03/23/2018 2     Basophils Relative 03/23/2018 0     Neutrophils Absolute 03/23/2018 3 94     Lymphocytes Absolute 03/23/2018 1 41     Monocytes Absolute 03/23/2018 0 51     Eosinophils Absolute 03/23/2018 0 12     Basophils Absolute 03/23/2018 0 01     Sodium 03/23/2018 136     Potassium 03/23/2018 4 8     Chloride 03/23/2018 99*    CO2 03/23/2018 27     Anion Gap 03/23/2018 10     BUN 03/23/2018 33*    Creatinine 03/23/2018 1 53*    Glucose 03/23/2018 422*    Calcium 03/23/2018 9 5     AST 03/23/2018 26     ALT 03/23/2018 39     Alkaline Phosphatase 03/23/2018 194*    Total Protein 03/23/2018 8 1     Albumin 03/23/2018 3 4*    Total Bilirubin 03/23/2018 0 38     eGFR 03/23/2018 47     Protime 03/23/2018 13 2     INR 03/23/2018 1 00     PTT 03/23/2018 28     Sed Rate 03/23/2018 26*    Troponin I 03/23/2018 <0 02     NT-proBNP 03/23/2018 1074*    Total CK 03/23/2018 53     CRP, High Sensitivity 03/23/2018 49 75     WBC 03/23/2018 6 52     RBC 03/23/2018 4 66     Hemoglobin 03/23/2018 13 3     Hematocrit 03/23/2018 40 9     MCV 03/23/2018 88     MCH 03/23/2018 28 5     MCHC 03/23/2018 32 5     RDW 03/23/2018 14 5     Platelets 25/07/2433 185     MPV 03/23/2018 11 2     Platelets 37/82/6499 185     MPV 03/23/2018 11 2     POC Glucose 03/23/2018 310*                   Invalid input(s): LABAEARO            Imaging: I have personally reviewed pertinent films in PACS  EKG, Pathology, and Other Studies: I have personally reviewed pertinent reports        Code Status: Level 1 - Full Code  Advance Directive and Living Will:      Power of :    POLST:

## 2018-03-23 NOTE — CONSULTS
Consultation - Infectious Disease   Janette Joaquin Cummins 74 y o  male MRN: [id number]  Unit/Bed#: [facility] 68 2 -01 Encounter: [id number]      IMPRESSION & RECOMMENDATIONS:   Impression/Recommendations: This is a 74 y o  male, morbidly obese, with noncompliance with medication and with poor hygiene, presented the ER last night with a chronic draining left heel ulcer  He has no fever or leukocytosis  1   Draining left heel ulcer  On examination, this appears to be chronic, with no evidence of cellulitis  Patient has no fever or leukocytosis  I suspect the development and worsening of ulcer is due to a combination of poorly controlled leg edema and extremely poor hygiene  No antibiotic is necessary for this  All patient is local wound care  More specifically, when he needs to most is basic hygiene (eg   shower or bathe on a regular basis)  Discontinue antibiotic  Local wound care  Discussed with patient in detail regarding the need for basic hygiene  2   Venous stasis with poorly controlled leg edema  This is most likely secondary to sedentary lifestyle, exacerbated by morbid obesity  Patient need to lose weight  He has exercised  Any to keep his legs elevated  He may benefit from compression stockings short-term  Keep legs elevated to control edema  3   CKD  4   Poorly compliant DM, with hyperglycemia on admission  5   Poor general hygiene  Discussed with patient in detail regarding all above  Discussed with Dr Joshua Freeman from [facility] service  Thank you for this consultation  We will follow along with you  HISTORY OF PRESENT ILLNESS:  Reason for Consult:  Left heel draining wound  HPI: Joan Leonard is a 74 y o  male, with multiple medical problems but not compliant with his medications, came to the ER last night with a draining left heel wound  Patient has chronic wound/ulcer in the past   This did you with local care    However, over last few weeks, he has noticed left heel ulcer again  He states that his legs are cold way edematous and red  He has no pain in the heel of foot  Drainage is mostly clear  Of note, the patient takes his medications intermittently at home  In addition, he does not shower or based on a regular basis  When asked, he states that the last time the shower or bathe prior to admission was more than a month ago  REVIEW OF SYSTEMS:  A complete 12 point system-based review of systems is otherwise negative  PAST MEDICAL HISTORY:  Past Medical History:   Diagnosis Date    [facility]     COPD [facility]chronic obstructive pulmonary disease) [facility]     Decubitus ulcer of heel     LAST ASSESSED [id number]    Diabetes mellitus ([facility])     History of varicose veins     Hypertension     Intermittent claudication ([facility])     Neuropathy     Seasonal allergies      Past Surgical History:   Procedure Laterality Date    ANGIOPLASTY      W/ FLUOROSC ANGIOGRAPGY PERIPHERAL ARTERY ADDITIONAL  LAST ASSESSED [id number]    ANGIOPLASTY / STENTING FEMORAL      TANSCATH INTRAVASCULAR STENT PLACEMENT PERCUTANEOUS FEMORAL     FULL THICKNESS SKIN GRAFT      TENDON REPAIR       Problem list reviewed  FAMILY HISTORY:  Non-contributory    SOCIAL HISTORY:  History   Alcohol Use    Yes     Comment: RARE     History   Drug Use No     History   Smoking Status    Never Smoker   Smokeless Tobacco    Never Used       ALLERGIES:  Allergies   Allergen Reactions    Latex Rash       MEDICATIONS:  All current active medications have been reviewed  Patient is currently on IV vancomycin  PHYSICAL EXAM:  Vitals:  HR:  [80-85] 85  Resp:  [16-18] 18  BP: (128-151)/(60-84) 137/62  SpO2:  [94 %-97 %] 95 %  Temp (24hrs), Av °F (36 7 °C), Min:97 4 °F (36 3 °C), Max:98 5 °F (36 9 °C)  Current: Temperature: 97 7 °F (36 5 °C)     Physical Exam:  General:  Chronically ill-appearing, morbidly obese, disheveled, in no acute distress   Awake, alert and oriented x 3   Eyes:  Conjunctive clear with no hemorrhages or effusions  Oropharynx:  No ulcers, no lesions, pharynx benign, no tonsillitis  Neck:  Supple, no lymphadenopathy, no mass, nontender  Lungs:  Expansion symmetric, no rales, no wheezing, no accessory muscle use  Cardiac:  Regular rate and rhythm, normal S1, normal S2, no murmurs  Abdomen:  Soft, nondistended, non-tender, no HSM  Extremities:  2+ leg edema  Moderate venous stasis changes  Left heel ulcer superficial   No purulence  No fluctuance  No tenderness  Skin:  No rashes, no ulcers  Neurological:  Moves all four extremities spontaneously, sensation grossly intact    LABS, IMAGING, & OTHER STUDIES:  Lab Results:  I have personally reviewed pertinent labs  Results from last 7 days  Lab Units 03/23/18  0236   SODIUM mmol/L 136   POTASSIUM mmol/L 4 8   CHLORIDE mmol/L 99*   CO2 mmol/L 27   ANION GAP mmol/L 10   BUN mg/dL 33*   CREATININE mg/dL 1 53*   EGFR ml/min/1 73sq m 47   GLUCOSE RANDOM mg/dL 422*   CALCIUM mg/dL 9 5   AST U/L 26   ALT U/L 39   ALK PHOS U/L 194*   TOTAL PROTEIN g/dL 8 1   BILIRUBIN TOTAL mg/dL 0 38       Results from last 7 days  Lab Units 03/23/18  0618 03/23/18  0236   WBC Thousand/uL 6 52 5 99   HEMOGLOBIN g/dL 13 3 14 2   PLATELETS Thousands/uL 185  185 183           Imaging Studies:   I have personally reviewed pertinent imaging study reports and images in PACS  Left foot x-ray reviewed personally  No bony abnormalities  CXR reviewed personally  No infiltrates or consolidations  EKG, Pathology, and Other Studies:   I have personally reviewed pertinent reports

## 2018-03-23 NOTE — PLAN OF CARE
Problem: PHYSICAL THERAPY ADULT  Goal: Performs mobility at highest level of function for planned discharge setting  See evaluation for individualized goals  Treatment/Interventions: Functional transfer training, Therapeutic exercise, Endurance training, Patient/family training, Bed mobility, Spoke to nursing, OT  Equipment Recommended: Wheelchair, Walker       See flowsheet documentation for full assessment, interventions and recommendations  Prognosis: Good  Problem List: Decreased strength, Decreased endurance, Impaired balance, Decreased mobility, Decreased safety awareness, Impaired sensation, Obesity, Decreased skin integrity, Orthopedic restrictions  Assessment: Pt is 75 yo male admitted from home w/ friend for c/o L foot ulcer  Pt has prolonged distory of L foot ulcer complications  PTA patient reports that he does his own functional mobility and ADLs at home  He has RW, cane, and w/c but his house is not big enough to use equipment  Pt denies any recent falls and no complaints of pain  Pt demonstrates current level of supervision supine>sit, sit<>stand bed>chair w/ RW modAx1  Pt with difficulty maintaining NWB LLE and quick to fatigue d/t decreased strength  Pt would benefit from continued skilled PT for deficits in strength, balance, functional mobility, safety w/ mobility, gait and functional endurance  PT recommend short term rehab  Barriers to Discharge: Decreased caregiver support, Inaccessible home environment  Barriers to Discharge Comments: equipment does not fit in house, pt is now NWB LLE, decreased support at home often home alone  Recommendation: Short-term skilled PT     PT - OK to Discharge: Yes    See flowsheet documentation for full assessment

## 2018-03-23 NOTE — H&P
History and Physical - [facility] Internal Medicine    Patient Information: Russel Denver 74 y o  male MRN: [id number]  Unit/Bed#: [facility] Encounter: [id number]  Admitting Physician: Angela Prabhakar PA-C  PCP: Krystin Ace MD  Date of Admission:  03/23/18    Assessment/Plan:    Hospital Problem List:     Active Problems:    Diabetic foot ulcer ([place] [address] )    COPD (chronic obstructive pulmonary disease) ([place] [address] )    Diabetes mellitus type 2, uncontrolled ([place] [address] )    Gout    Hypertension    Chronic atrial fibrillation ([place] [address] )    Chronic diastolic congestive heart failure (HCC)    GERD (gastroesophageal reflux disease)    Hyperlipidemia    Mild persistent asthma without complication      Plan for the Primary Problem(s):  · Diabetic foot ulcer left foot  · Admit to med/surg  Started on vanco in ED, will continue  Consult wound care and podiatry  Patient has been noncompliant with medications and treatments at home  He was unable to afford the diabetic shoes  Blood sugars have been chronically elevated  Plan for Additional Problems:   · COPD/asthma- continue albuterol and restart advair  He states he only has his rescue inhaler at home  · Uncontrolled DM- check A1C  Patient has not been compliant with his home insulin or diabetic diet  Order prescribed home levemir dose and order accuchecks with coverage  · Gout- on allopurinol and colchicine  Had been on prednisone at one point but only a medrol dose pack that he completed  · HTN- continue cozaar and metoprolol  · afib- seen by Dr Julee López in the past  Has historically refused coumadin      VTE Prophylaxis: Heparin  / reason for no mechanical VTE prophylaxis leg wounds   Code Status: full code  POLST: There is no POLST form on file for this patient (pre-hospital)    Anticipated Length of Stay:  Patient will be admitted on an Inpatient basis with an anticipated length of stay of  Greater than 2 midnights     Justification for Hospital Stay: patient requires IV vanco and podiatry consult    Total Time for Visit, including Counseling / Coordination of Care: 45 minutes  Greater than 50% of this total time spent on direct patient counseling and coordination of care  Chief Complaint:   Foot ulcer    History of Present Illness:    Ludin Garcia is a 74 y o  male who presents with left foot ulcer  He has a history of medication and diet noncompliance  He lives at home  His phone was recently turned off by his sister  There are notes from his PCP in the chart that they wasted to get agency on aging involved  He states he had a foot ulcer that healed in the past when he was seeing podiatry  He was admitted in October and was sent to a nursing home afterward  Since he has been home he has been taking his medications intermittently  He states he can't afford his meds and has to "stretch them out"  His foot started draining again 2 weeks ago  He also has noticed heart palpitations  He has a history of afib  He has refused coumadin in the past      Review of Systems:    Review of Systems   Constitutional: Positive for diaphoresis  HENT: Negative  Eyes: Negative  Respiratory: Negative  Cardiovascular: Positive for leg swelling  Gastrointestinal: Negative  Endocrine: Negative  Genitourinary: Negative  Musculoskeletal: Positive for gait problem  Skin: Positive for color change and wound  Allergic/Immunologic: Negative  Hematological: Negative  Psychiatric/Behavioral: Negative          Past Medical and Surgical History:     Past Medical History:   Diagnosis Date    [facility]     [facility]     Decubitus ulcer of heel     LAST ASSESSED [id number]    Diabetes mellitus ([facility] )     History of varicose veins     Hypertension     [facility]termittent claudication ([facility])     Neuropathy     Seasonal allergies        Past Surgical History:   Procedure Laterality Date    ANGIOPLASTY      W/ FLUOROSC ANGIOGRAPGY PERIPHERAL ARTERY ADDITIONAL  LAST ASSESSED [id number]    ANGIOPLASTY / STENTING FEMORAL      TANSCATH INTRAVASCULAR STENT PLACEMENT PERCUTANEOUS FEMORAL     FULL THICKNESS SKIN GRAFT      TENDON REPAIR         Meds/Allergies:    Prior to Admission medications    Medication Sig Start Date End Date Taking? Authorizing Provider   albuterol (PROVENTIL HFA,VENTOLIN HFA) 90 mcg/act inhaler Inhale 2 puffs every 6 (six) hours as needed for wheezing  Historical Provider, MD   allopurinol (ZYLOPRIM) 300 mg tablet Take 300 mg by mouth daily  Historical Provider, MD   colchicine (COLCRYS) 0 6 mg tablet Take 2 tablets (1 2 mg total) by mouth daily Two tablets today  Then one tablet an hour later  Then take one tablet daily  3/9/18   NICOLA Coyne   fluticasone-salmeterol (ADVAIR) 250-50 mcg/dose inhaler Inhale 1 puff every 12 (twelve) hours  Historical Provider, MD   furosemide (LASIX) 80 mg tablet Take 1 tablet by mouth 2 (two) times a day 10/11/17   Padmini Rivas MD   insulin detemir (LEVEMIR) 100 units/mL subcutaneous injection Inject 50 Units under the skin 3 (three) times a day with meals      Historical Provider, MD   insulin lispro (HumaLOG) 100 units/mL injection Inject 5 Units under the skin 3 (three) times a day before meals for 30 days 10/11/17 11/10/17  Padmini Rivas MD   losartan (COZAAR) 50 mg tablet Take 50 mg by mouth daily  Historical Provider, MD   Methylprednisolone 4 MG TBPK Use as directed on package 3/9/18   NICOLA Coyne   metoprolol tartrate (LOPRESSOR) 25 mg tablet Take 25 mg by mouth 2 (two) times a day  Historical Provider, MD   predniSONE 10 mg tablet One tab bid breakfast and suppertime 2/2/18   Rudolph Pllaron,    spironolactone (ALDACTONE) 25 mg tablet Take 25 mg by mouth daily    Historical Provider, MD     I have reviewed home medications with patient personally  Allergies:    Allergies   Allergen Reactions    Latex Rash       Social History: Marital Status:    Occupation: unemployed  Patient Pre-hospital Living Situation: lives with friend  Patient Pre-hospital Level of Mobility: ambulatory  Patient Pre-hospital Diet Restrictions: none  Substance Use History:   History   Alcohol Use    Yes     Comment: RARE     History   Smoking Status    Never Smoker   Smokeless Tobacco    Never Used     History   Drug Use No       Family History:    non-contributory    Physical Exam:     Vitals:   Blood Pressure: 151/71 (03/23/18 0243)  Pulse: 81 (03/23/18 0243)  Temperature: (!) 97 4 °F (36 3 °C) (03/23/18 0128)  Temp Source: Oral (03/23/18 0128)  Respirations: 16 (03/23/18 0243)  Weight - Scale: (!) 158 kg (348 lb) (03/23/18 0128)  SpO2: 97 % (03/23/18 0243)    Physical Exam   Constitutional: He is oriented to person, place, and time  No distress  Generally poor hygiene   HENT:   Head: Normocephalic and atraumatic  Eyes: Conjunctivae are normal  Pupils are equal, round, and reactive to light  Neck: Normal range of motion  Neck supple  No JVD present  No tracheal deviation present  No thyromegaly present  Cardiovascular: Normal rate  Irregular rhythm   Pulmonary/Chest: Effort normal    Decreased breath sounds bilaterally   Abdominal: Soft  Bowel sounds are normal  There is no tenderness  obese   Musculoskeletal:   Left leg edema   Lymphadenopathy:     He has no cervical adenopathy  Neurological: He is alert and oriented to person, place, and time  Skin: He is not diaphoretic  Left foot with chronic heel ulcer with foul smelling drainage   Psychiatric: He has a normal mood and affect  His behavior is normal    Vitals reviewed  Additional Data:     Lab Results: I have personally reviewed pertinent reports          Results from last 7 days  Lab Units 03/23/18  0236   WBC Thousand/uL 5 99   HEMOGLOBIN g/dL 14 2   HEMATOCRIT % 43 5   PLATELETS Thousands/uL 183   NEUTROS PCT % 65   LYMPHS PCT % 24   MONOS PCT % 9   EOS PCT % 2 Results from last 7 days  Lab Units 03/23/18  0236   SODIUM mmol/L 136   POTASSIUM mmol/L 4 8   CHLORIDE mmol/L 99*   CO2 mmol/L 27   BUN mg/dL 33*   CREATININE mg/dL 1 53*   CALCIUM mg/dL 9 5   TOTAL PROTEIN g/dL 8 1   BILIRUBIN TOTAL mg/dL 0 38   ALK PHOS U/L 194*   ALT U/L 39   AST U/L 26   GLUCOSE RANDOM mg/dL 422*       Results from last 7 days  Lab Units 03/23/18  0236   INR  1 00       Imaging: I have personally reviewed pertinent reports  No results found  EKG, Pathology, and Other Studies Reviewed on Admission:   · EKG: afib    Allscripts / Epic Records Reviewed: Yes     ** Please Note: This note has been constructed using a voice recognition system   **

## 2018-03-23 NOTE — PLAN OF CARE
INFECTION - ADULT     Absence or prevention of progression during hospitalization Progressing     Absence of fever/infection during neutropenic period Progressing        Nutrition/Hydration-ADULT     Nutrient/Hydration intake appropriate for improving, restoring or maintaining nutritional needs Progressing        Potential for Falls     Patient will remain free of falls Progressing        SAFETY ADULT     Maintain or return to baseline ADL function Progressing     Maintain or return mobility status to optimal level Progressing        SKIN/TISSUE INTEGRITY - ADULT     Skin integrity remains intact Progressing     Incision(s), wounds(s) or drain site(s) healing without S/S of infection Progressing

## 2018-03-23 NOTE — ED PROVIDER NOTES
History  Chief Complaint   Patient presents with    Foot Ulcer     pt c/o left foot heel wound  pt is a diabetic and has not been seen at the wound center for the past 2 months  pt called  today and was told" if the heel is oozing and painful he should come to the ED" Pt denies any other issues  HPI    Prior to Admission Medications   Prescriptions Last Dose Informant Patient Reported? Taking? Methylprednisolone 4 MG TBPK   No No   Sig: Use as directed on package   albuterol (PROVENTIL HFA,VENTOLIN HFA) 90 mcg/act inhaler  Self Yes No   Sig: Inhale 2 puffs every 6 (six) hours as needed for wheezing  allopurinol (ZYLOPRIM) 300 mg tablet  Self Yes No   Sig: Take 300 mg by mouth daily  colchicine (COLCRYS) 0 6 mg tablet   No No   Sig: Take 2 tablets (1 2 mg total) by mouth daily Two tablets today  Then one tablet an hour later  Then take one tablet daily  fluticasone-salmeterol (ADVAIR) 250-50 mcg/dose inhaler  Self Yes No   Sig: Inhale 1 puff every 12 (twelve) hours  furosemide (LASIX) 80 mg tablet  Self No No   Sig: Take 1 tablet by mouth 2 (two) times a day   insulin detemir (LEVEMIR) 100 units/mL subcutaneous injection  Self Yes No   Sig: Inject 50 Units under the skin 2 (two) times a day     insulin lispro (HumaLOG) 100 units/mL injection   No No   Sig: Inject 5 Units under the skin 3 (three) times a day before meals for 30 days   losartan (COZAAR) 50 mg tablet  Self Yes No   Sig: Take 50 mg by mouth daily  metoprolol tartrate (LOPRESSOR) 25 mg tablet  Self Yes No   Sig: Take 25 mg by mouth 2 (two) times a day     predniSONE 10 mg tablet  Self No No   Sig: One tab bid breakfast and suppertime   spironolactone (ALDACTONE) 25 mg tablet  Self Yes No   Sig: Take 25 mg by mouth daily      Facility-Administered Medications: None       Past Medical History:   Diagnosis Date    [facility]congest[facility] failure[facility]     COPD [facility]chronic obstructive pulmonary disease) ([facility]     Decubitus ulcer of heel LAST ASSESSED [id number]    [facility] mellitus [facility]     History of varicose veins     Hypertension     Intermittent claudication (HCC)     Neuropathy     Seasonal allergies        Past Surgical History:   Procedure Laterality Date    ANGIOPLASTY      W/ FLUOROSC ANGIOGRAPGY PERIPHERAL ARTERY ADDITIONAL  LAST ASSESSED [id number]    ANGIOPLASTY / STENTING FEMORAL      TANSCATH INTRAVASCULAR STENT PLACEMENT PERCUTANEOUS FEMORAL     FULL THICKNESS SKIN GRAFT      TENDON REPAIR         Family History   Problem Relation Age of Onset    Other Mother      CARDIAC DISORDER     Diabetes Mother     Cancer Father     Other Family      CARDIAC DISORDER     Diabetes Family     Cancer Family     Mental illness Family      I have reviewed and agree with the history as documented  Social History   Substance Use Topics    Smoking status: Never Smoker    Smokeless tobacco: Never Used    Alcohol use Yes      Comment: RARE        Review of Systems    Physical Exam  ED Triage Vitals [03/23/18 0128]   Temperature Pulse Respirations Blood Pressure SpO2   (!) 97 4 °F (36 3 °C) 85 18 148/84 96 %      Temp Source Heart Rate Source Patient Position - Orthostatic VS BP Location FiO2 (%)   Oral Monitor Sitting Right arm --      Pain Score       2           Orthostatic Vital Signs  Vitals:    03/25/18 1553 03/26/18 0010 03/26/18 0724 03/26/18 1513   BP: 159/81 132/58 137/66 140/75   Pulse: 76 65 80 68   Patient Position - Orthostatic VS: Lying Lying Sitting Sitting       Physical Exam   Constitutional: He is oriented to person, place, and time  He appears well-developed and well-nourished  No distress  HENT:   Head: Normocephalic and atraumatic  Eyes: Conjunctivae are normal    Neck: Normal range of motion  Neck supple  Cardiovascular: Normal rate  Pulmonary/Chest: Effort normal    Abdominal: Soft  There is no tenderness  obese abd   Genitourinary: Rectal exam shows guaiac negative stool  No penile tenderness  Musculoskeletal: He exhibits edema and tenderness  Neurological: He is alert and oriented to person, place, and time  Skin: Skin is warm  ED Medications  Medications   vancomycin (VANCOCIN) 1,750 mg in sodium chloride 0 9 % 500 mL IVPB (0 mg Intravenous Stopped 3/23/18 0650)       Diagnostic Studies  Results Reviewed     Procedure Component Value Units Date/Time    Blood culture #1 [[id number]] Collected:  03/23/18 0236    Lab Status:  Final result Specimen:  Blood from Arm, Right Updated:  03/28/18 0901     Blood Culture No Growth After 5 Days  Blood culture #2 [[id number]] Collected:  03/23/18 0236    Lab Status:  Final result Specimen:  Blood from Arm, Left Updated:  03/28/18 0901     Blood Culture No Growth After 5 Days      CBC (With Platelets) [[id number]]  (Normal) Collected:  03/23/18 0618    Lab Status:  Final result Specimen:  Blood from Foot, Left Updated:  03/23/18 0636     WBC 6 52 Thousand/uL      RBC 4 66 Million/uL      Hemoglobin 13 3 g/dL      Hematocrit 40 9 %      MCV 88 fL      MCH 28 5 pg      MCHC 32 5 g/dL      RDW 14 5 %      Platelets 704 Thousands/uL      MPV 11 2 fL     Platelet count [[id number]]  (Normal) Collected:  03/23/18 0618    Lab Status:  Final result Specimen:  Blood from Foot, Left Updated:  03/23/18 0636     Platelets 886 Thousands/uL      MPV 11 2 fL     B-type natriuretic peptide [[id number]]  (Abnormal) Collected:  03/23/18 0236    Lab Status:  Final result Specimen:  Blood from Arm, Right Updated:  03/23/18 0405     NT-proBNP 1,074 (H) pg/mL     High sensitivity CRP [[id number]]  (Normal) Collected:  03/23/18 0236    Lab Status:  Final result Specimen:  Blood from Arm, Right Updated:  03/23/18 0405     CRP, High Sensitivity 49 75 mg/L     Narrative:               HsCRP Level       Relative Risk           <1 0 mg/L          Low           1 0 to 3 0 mg/L    Average           >3 0 mg/L          High      Sedimentation rate, automated [[id number]]  (Abnormal) Collected:  03/23/18 0236    Lab Status:  Final result Specimen:  Blood from Arm, Right Updated:  03/23/18 0403     Sed Rate 26 (H) mm/hour     Troponin I [[id number]]  (Normal) Collected:  03/23/18 0236    Lab Status:  Final result Specimen:  Blood from Arm, Right Updated:  03/23/18 0301     Troponin I <0 02 ng/mL     Narrative:         [employer] Chemistry analyzer 99% cutoff is > 0 04 ng/mL in network labs    o cTnI 99% cutoff is useful only when applied to patients in the clinical setting of myocardial ischemia  o cTnI 99% cutoff should be interpreted in the context of clinical history, ECG findings and possibly cardiac imaging to establish correct diagnosis  o cTnI 99% cutoff may be suggestive but clearly not indicative of a coronary event without the clinical setting of myocardial ischemia  Comprehensive metabolic panel [[id number]]  (Abnormal) Collected:  03/23/18 0236    Lab Status:  Final result Specimen:  Blood from Arm, Right Updated:  03/23/18 0301     Sodium 136 mmol/L      Potassium 4 8 mmol/L      Chloride 99 (L) mmol/L      CO2 27 mmol/L      Anion Gap 10 mmol/L      BUN 33 (H) mg/dL      Creatinine 1 53 (H) mg/dL      Glucose 422 (H) mg/dL      Calcium 9 5 mg/dL      AST 26 U/L      ALT 39 U/L      Alkaline Phosphatase 194 (H) U/L      Total Protein 8 1 g/dL      Albumin 3 4 (L) g/dL      Total Bilirubin 0 38 mg/dL      eGFR 47 ml/min/1 73sq m     Narrative:         National Kidney Disease Education Program recommendations are as follows:  GFR calculation is accurate only with a steady state creatinine  Chronic Kidney disease less than 60 ml/min/1 73 sq  meters  Kidney failure less than 15 ml/min/1 73 sq  meters      CK Total with Reflex CKMB [[id number]]  (Normal) Collected:  03/23/18 0236    Lab Status:  Final result Specimen:  Blood from Arm, Right Updated:  03/23/18 0301     Total CK 53 U/L     CBC and differential [[id number]]  (Normal) Collected:  03/23/18 0236    Lab Status:  Final result Specimen: Blood from Arm, Right Updated:  03/23/18 0255     WBC 5 99 Thousand/uL      RBC 4 96 Million/uL      Hemoglobin 14 2 g/dL      Hematocrit 43 5 %      MCV 88 fL      MCH 28 6 pg      MCHC 32 6 g/dL      RDW 14 5 %      MPV 11 2 fL      Platelets 256 Thousands/uL      nRBC 0 /100 WBCs      Neutrophils Relative 65 %      Lymphocytes Relative 24 %      Monocytes Relative 9 %      Eosinophils Relative 2 %      Basophils Relative 0 %      Neutrophils Absolute 3 94 Thousands/µL      Lymphocytes Absolute 1 41 Thousands/µL      Monocytes Absolute 0 51 Thousand/µL      Eosinophils Absolute 0 12 Thousand/µL      Basophils Absolute 0 01 Thousands/µL     Protime-INR [[id number]]  (Normal) Collected:  03/23/18 0236    Lab Status:  Final result Specimen:  Blood from Arm, Right Updated:  03/23/18 0254     Protime 13 2 seconds      INR 1 00    APTT [[id number]]  (Normal) Collected:  03/23/18 0236    Lab Status:  Final result Specimen:  Blood from Arm, Right Updated:  03/23/18 0254     PTT 28 seconds     Narrative: Therapeutic Heparin Range = 60-90 seconds                 XR ankle 3+ views LEFT   ED Interpretation by Judith Gosselin, PA-C (03/23 2404)   Diabetic ulcer at heal   No overt bony involvement or gas in tissue  Final Result by Victoria Sal MD (03/23 8584)      Diffuse soft tissue swelling of the visualized lower extremity without acute osseous findings  Specifically, no radiographic evidence for osteomyelitis  Workstation performed: [id number]         XR foot 3+ views LEFT   ED Interpretation by Judith Gosselin, PA-C (03/23 0381)   Diabetic ulcer at heal   No overt bony involvement or gas in tissue  Final Result by Victoria Sal MD (03/23 8599)      Diffuse soft tissue swelling of the visualized lower extremity without acute osseous findings  Specifically, no radiographic evidence for osteomyelitis        Workstation performed: [id number]         XR chest 1 view portable   ED Interpretation by Rosi Cleaning PA-C (03/23 5326)   Pulmonary congestion appreciated  Final Result by Lianne Casas MD (03/23 1901)      No acute abnormality in the chest             Workstation performed: [id number]                    Procedures  Procedures       Phone Contacts  ED Phone Contact    ED Course  ED Course                                MDM  Number of Diagnoses or Management Options  Acute renal injury [facility]):   Ambulatory dysfunction:   Diabetes mellitus type 2, uncontrolled ([place] [address] ):   Diabetes, polyneuropathy ([place] [address] ): Foot ulcer ([place] [address] ):   Heel ulcer due to secondary DM [facility]): Hyperglycemia:   Hypertension:   Onychomycosis:   PVD (peripheral vascular disease) [facility]):   Diagnosis management comments: 73 y/o male with multiple comorbid conditions that pt admits to noncompliance  Labs reviewed  Imagines reviewed  Pt foot ulcer is reopened and legs are swollen and pt is unable to bare weight  Pt is felt in need for inpt eval and management  Pt agreeable  General med agreeable  Pt remained stable in the department and upon transfer standard nursing floor       CritCare Time    Disposition  Final diagnoses:   Ambulatory dysfunction   Heel ulcer due to secondary DM ([facility] )   Diabetes mellitus type 2, uncontrolled ([facility] )   Hypertension   Diabetes, polyneuropathy ([place] [address] )   Onychomycosis   PVD (peripheral vascular disease) ([facility] )   Foot ulcer ([facility] )   Acute renal injury ([facility] )   Hyperglycemia   History of noncompliance with medical treatment   Morbid obesity ([facility] )     Time reflects when diagnosis was documented in both MDM as applicable and the Disposition within this note     Time User Action Codes Description Comment    3/23/2018  2:41 AM Valdez Perches Add [R26 2] Ambulatory dysfunction     3/23/2018  2:41 AM Valdez Perches Add [Q55 228,  B42 846] Heel ulcer due to secondary DM ([facility] [address] )     3/23/2018  2:42 AM Valdez Perches Add [E11 65] Diabetes mellitus type 2, uncontrolled ([facility] )     3/23/2018  2:42 AM Valdez Perches Add Kina Bueno Hypertension     3/23/2018  2:42 AM Evelina Bernaly Add [E11 42] Diabetes, polyneuropathy ([facility] [address] )     3/23/2018  2:42 AM Evelina Hall Add [B35 1] Onychomycosis     3/23/2018  2:42 AM Katha Schirmer, Lavone Henri Add [I73 9] PVD (peripheral vascular disease) ([facility] )     3/23/2018  2:54 AM Alfchris Bernaly Add [F34 573] Foot ulcer ([facility] )     3/23/2018  3:44 AM Alfchris Isabel Add [N17 9] Acute renal injury ([facility] )     3/23/2018  3:44 AM Alfceciliaia Isabel Add [R73 9] Hyperglycemia     3/23/2018  3:44 AM Alfchris Isabel Add [Z91 19] History of noncompliance with medical treatment     3/23/2018  3:44 AM Alfchris Isabel Add [E66 01] Morbid obesity ([facility] )     3/26/2018  4:07 PM Marval Michel Add [I48 2] Chronic atrial fibrillation [facility])       ED Disposition     ED Disposition Condition Comment    Admit  Case was discussed with CHRISTO and the patient's admission status was agreed to be Admission Status: inpatient status to the service of Dr Shyla Guerrero MD Documentation    [address] Name, mOa HOGUE  68       RN Documentation    [address] Name, Demetra costa      Follow-up Information     Follow up With Specialties Details Why Contact Info Additional Information    Kristian Follow up Call to schedule follow-up with Dr Cresencio Beaulieu  Thank you    [address] [place] [address]  [id number] [address] [place] [address], [facility], MD Family Medicine Follow up Follow up after discharge from rehab [address] [place] [address]  [phone]       Severo Crooked, MD Cardiology, Multidisciplinary Follow up in 5 month(s) Routine 6 month follow up [address] [place] [address] Road  [phone]           Discharge Medication List as of 3/26/2018  4:16 PM      START taking these medications Details   aspirin 81 mg chewable tablet Chew 1 tablet (81 mg total) daily, Starting Mon 3/26/2018, No Print         CONTINUE these medications which have CHANGED    Details   insulin lispro (HumaLOG) 100 units/mL injection Inject 20 Units under the skin 3 (three) times a day with meals, Starting Mon 3/26/2018, No Print         CONTINUE these medications which have NOT CHANGED    Details   albuterol (PROVENTIL HFA,VENTOLIN HFA) 90 mcg/act inhaler Inhale 2 puffs every 6 (six) hours as needed for wheezing , Historical Med      allopurinol (ZYLOPRIM) 300 mg tablet Take 300 mg by mouth daily  , Historical Med      colchicine (COLCRYS) 0 6 mg tablet Take 2 tablets (1 2 mg total) by mouth daily Two tablets today  Then one tablet an hour later  Then take one tablet daily  , Starting Fri 3/9/2018, Normal      fluticasone-salmeterol (ADVAIR) 250-50 mcg/dose inhaler Inhale 1 puff every 12 (twelve) hours  , Historical Med      furosemide (LASIX) 80 mg tablet Take 1 tablet by mouth 2 (two) times a day, Starting Wed 10/11/2017, Print      insulin detemir (LEVEMIR) 100 units/mL subcutaneous injection Inject 50 Units under the skin 2 (two) times a day  , Historical Med      losartan (COZAAR) 50 mg tablet Take 50 mg by mouth daily  , Historical Med      metoprolol tartrate (LOPRESSOR) 25 mg tablet Take 25 mg by mouth 2 (two) times a day , Historical Med      spironolactone (ALDACTONE) 25 mg tablet Take 25 mg by mouth daily, Historical Med         STOP taking these medications       Methylprednisolone 4 MG TBPK Comments:   Reason for Stopping:         predniSONE 10 mg tablet Comments:   Reason for Stopping:               Outpatient Discharge Orders  Discharge Diet     Discharge Condtion:  Stabilized     Patient Aware of Diagnosis: Yes     Free of Communicable Disease:    Yes     Activity:  As [facility]         ED Provider  Electronically Signed by           Martha James PA-C  04/02/18 0627

## 2018-03-23 NOTE — CONSULTS
Consultation - Nephrology   Silveriokimberlychica Cummins 74 y o  male MRN: [id number]  Unit/Bed#: [place] [address] [id number] Encounter: [id number]    ASSESSMENT and PLAN:  1  DORA (present on admission) on CKD - sCr peak 1 53    -Urinalysis with microscopy shows positive glucose without blood or protein  -will order bladder scan as difficulty urinating per patient history  ?retention in setting of diabetes as +polyneuropathy  -continue to hold ARB/diuretics for now in light of DORA  Did receive ARB/diuretics dose this am   -I suspect hemodynamic cause a bland UA and history of diuretic use at home  -Monitor off diuretics/IVF for today  F/u am BMP   -allow -140s for renal perfusion  2  CKD stage 2 - b/l sCr 1-1 1, should see nephrologist outpatient  I have concern his diabetes and HTN are contributing factors     3  Chronic dCHF - EF 60%, on lasix 80mg po daily at home as well as aldactone 25mg daily  Does have LE edema but this is also in setting of chronic venous stasis and diabetic foot ulcers  Hold diuretics for today  4  DM2, uncontrolled - last A1C 9 6 January 2018  Insulin per primary team     5  HTN - BP well controlled  Continue holding diuretics for now    6  Left foot ulcer, chronic - ID consulted  Monitor off Abx  Other issues: gout-on low dose allopurinol as well as daily colchicine  Would d/c colchicine if possible and patient allows as allopurinol alone can be used as prophylaxis, chronic afib, GERD, obesity, PAD, b/l heel ulcers      HISTORY OF PRESENT ILLNESS:  Requesting Physician: Chapo White MD  Reason for Consult: DORA    Genesis Arellano is a 74y o  year old male who was admitted to [facility] after presenting with left foot ulcer  A renal consultation is requested today for assistance in the management of DORA  The patient denies any history of prior kidney disease  He has never seen a nephrologist   He denies any NSAID use    He presented because his foot ulcer which she has been, budding for 2 years has not improved  He denies any pain but does describe the foot worsening and smell  He denies any fevers, chills, nausea, vomiting, chest pain or shortness of breath, abdominal pain  Does complain of diarrhea for the past week which has been nonbloody  He also complains of gas  He thinks his appetite has been okay  He has had chronic lower extremity edema  He states he was recently taking gout medication, prednisone and colchicine specifically  He states his gout has now resolved  He did have a gout flare in his hands  He states that at 1 point he was taking allopurinol but that this was ineffective for managing his gout  All he denies any dysuria hematuria he does state that it has been hard to urinate he does not think he always empties his bladder  He does use diuretics as well as Arb at home  His compliance is questionable  He admits that he has terribly controlled blood sugars in setting of diabetes mellitus type 2  He thinks that his blood pressure has been well controlled however      PAST MEDICAL HISTORY:  Past Medical History:   Diagnosis Date    [facility]est[facility]     COPD (chronic obstructive pulmonary disease) ([facility])     Decubitus ulcer of heel     LAST ASSESSED [id number]    Diabetes mellitus ([facility])     History of varicose veins     Hypertension     Intermittent claudication ([facility])     Neuropathy     Seasonal allergies        PAST SURGICAL HISTORY:  Past Surgical History:   Procedure Laterality Date    ANGIOPLASTY      W/ FLUOROSC ANGIOGRAPGY PERIPHERAL ARTERY ADDITIONAL  LAST ASSESSED [id number]    ANGIOPLASTY / STENTING FEMORAL      TANSCATH INTRAVASCULAR STENT PLACEMENT PERCUTANEOUS FEMORAL     FULL THICKNESS SKIN GRAFT      TENDON REPAIR         ALLERGIES:  Allergies   Allergen Reactions    Latex Rash       SOCIAL HISTORY:  History   Alcohol Use    Yes     Comment: RARE     History   Drug Use No     History   Smoking Status  Never Smoker   Smokeless Tobacco    Never Used       FAMILY HISTORY:  Family History   Problem Relation Age of Onset    Other Mother      CARDIAC DISORDER     Diabetes Mother     Cancer Father     Other Family      CARDIAC DISORDER     Diabetes Family     Cancer Family     Mental illness Family        MEDICATIONS:    Current Facility-Administered Medications:     acetaminophen (TYLENOL) tablet 650 mg, 650 mg, Oral, Q6H PRN, Ursula Cameron PA-C    albuterol (PROVENTIL HFA,VENTOLIN HFA) inhaler 2 puff, 2 puff, Inhalation, Q6H PRN, Ursula Cameron PA-C    allopurinol (ZYLOPRIM) tablet 300 mg, 300 mg, Oral, Daily, Ursula Cameron PA-C, 300 mg at 03/23/18 0801    aluminum-magnesium hydroxide-simethicone (MYLANTA) 200-200-20 mg/5 mL oral suspension 15 mL, 15 mL, Oral, Q6H PRN, Ursula Cameron PA-C    bacitracin topical ointment 1 small application, 1 small application, Topical, BID, Maki Buchananby, 1 small application at 78/88/45 1507    colchicine (COLCRYS) tablet 1 2 mg, 1 2 mg, Oral, Daily, Ursula Cameron PA-C, 1 2 mg at 03/23/18 0802    fluticasone-salmeterol (ADVAIR) 250-50 mcg/dose inhaler 1 puff, 1 puff, Inhalation, Q12H [facility], Ursula Cameron PA-C, 1 puff at 03/23/18 0801    furosemide (LASIX) injection 40 mg, 40 mg, Intravenous, BID (diuretic), Ursula Cameron PA-C, 40 mg at 03/23/18 1507    heparin (porcine) subcutaneous injection 5,000 Units, 5,000 Units, Subcutaneous, Q8H [facility] intermediate, 5,000 Units at 03/23/18 1507 **AND** Platelet count, , , Once, Ursula Cameron PA-C    insulin detemir (LEVEMIR) subcutaneous injection 50 Units, 50 Units, Subcutaneous, Q12H [facility], Anna Alegre MD    insulin lispro (HumaLOG) 100 units/mL subcutaneous injection 1-5 Units, 1-5 Units, Subcutaneous, HS, Ursula Cameron PA-C    insulin lispro (HumaLOG) 100 units/mL subcutaneous injection 15 Units, 15 Units, Subcutaneous, TID With Meals, Anna Alegre MD, 15 Units at 03/23/18 1206    insulin lispro (HumaLOG) 100 units/mL subcutaneous injection 2-12 Units, 2-12 Units, Subcutaneous, TID AC, 8 Units at 03/23/18 1206 **AND** Fingerstick Glucose (POCT), , , TID AC, Jesús Payan PA-C    losartan (COZAAR) tablet 50 mg, 50 mg, Oral, Daily, LESLEY Anguiano-SHANEKA, 50 mg at 03/23/18 0801    metoprolol tartrate (LOPRESSOR) tablet 25 mg, 25 mg, Oral, BID, LESLEY Anguiaon-SHANEKA, 25 mg at 03/23/18 0802    ondansetron (ZOFRAN) injection 4 mg, 4 mg, Intravenous, Q6H PRN, Jesús Payan PA-C    spironolactone (ALDACTONE) tablet 25 mg, 25 mg, Oral, Daily, Jesús Payan PA-C, 25 mg at 03/23/18 0802    vancomycin (VANCOCIN) 1,750 mg in sodium chloride 0 9 % 500 mL IVPB, 15 mg/kg (Adjusted), Intravenous, Q12H, Jesús Payan PA-C    REVIEW OF SYSTEMS:  More than 10 systems were reviewed  No other pertinent positive findings other than those mentioned in HPI  PHYSICAL EXAM:  Current Weight: Weight - Scale: (!) 158 kg (348 lb)  First Weight: Weight - Scale: (!) 158 kg (348 lb)  Vitals:    03/23/18 0540 03/23/18 0727 03/23/18 1141 03/23/18 1527   BP: 128/71 128/60  137/62   BP Location: Left arm Left arm     Pulse: 82 80  85   Resp: 18 18 18   Temp: 98 4 °F (36 9 °C) 98 5 °F (36 9 °C)  97 7 °F (36 5 °C)   TempSrc: Tympanic Tympanic  Tympanic   SpO2: 94% 94%  95%   Weight:       Height:   6' 3" (1 905 m)        Intake/Output Summary (Last 24 hours) at 03/23/18 1551  Last data filed at 03/23/18 1513   Gross per 24 hour   Intake                0 ml   Output             1150 ml   Net            -1150 ml     Physical Exam   Constitutional: He appears well-developed and well-nourished  No distress  obese   HENT:   Head: Normocephalic and atraumatic  Mouth/Throat: No oropharyngeal exudate  Eyes: Right eye exhibits no discharge  Left eye exhibits no discharge  No scleral icterus  Neck: Normal range of motion  Neck supple  No thyromegaly present  Cardiovascular: Normal rate, regular rhythm and normal heart sounds  Pulmonary/Chest: Effort normal and breath sounds normal  He has no wheezes  He has no rales  Abdominal: Soft  Bowel sounds are normal  He exhibits no distension  There is no tenderness  obese   Musculoskeletal: Normal range of motion  He exhibits edema (bilateral lower extremity edema, +left foot ulcer)  Neurological: He is alert  He exhibits normal muscle tone  awake   Skin: Skin is warm and dry  He is not diaphoretic    +left foot ulcer   Psychiatric: He has a normal mood and affect  His behavior is normal    Vitals reviewed        Invasive Devices:      Lab Results:     Results from last 7 days  Lab Units 03/23/18  0618 03/23/18  0236   WBC Thousand/uL 6 52 5 99   HEMOGLOBIN g/dL 13 3 14 2   HEMATOCRIT % 40 9 43 5   PLATELETS Thousands/uL 185  185 183   SODIUM mmol/L  --  136   POTASSIUM mmol/L  --  4 8   CHLORIDE mmol/L  --  99*   CO2 mmol/L  --  27   BUN mg/dL  --  33*   CREATININE mg/dL  --  1 53*   CALCIUM mg/dL  --  9 5   TOTAL PROTEIN g/dL  --  8 1   BILIRUBIN TOTAL mg/dL  --  0 38   ALK PHOS U/L  --  194*   ALT U/L  --  39   AST U/L  --  26   GLUCOSE RANDOM mg/dL  --  422*

## 2018-03-23 NOTE — CONSULTS
Patient seen by podiatry for lower extremity and left heel wounds  Wound care orders placed by podiatry  Patient has no other wounds  Education provided to patient about the importance of good hygiene and applying lotion such as Eucerin to his lower extremities daily  Emphasized importance of elevating lower extremities during the day and wearing compression stockings (he has tubigrips that he does not wear) as he was instructed to prevent skin scaling and new wounds  Also encouraged patient to avoid picking at scabs to prevent introducing infection (patient states he often scratches scabs open and causes bleeding)  Wound care team will sign off at this time  Dr Zita Collins made aware      Brandon Pride BSN, RN, CCRN ()

## 2018-03-23 NOTE — OCCUPATIONAL THERAPY NOTE
OccupationalTherapy Evaluation(time=8869-8455)     Patient Name: Michael GUIDO Date: 3/23/2018  Problem List  Patient Active Problem List   Diagnosis    [facility] foot ulcer ([facility] )    [facility]    Diabetes mellitus type 2, uncontrolled ([facility] )    Gout    [facility]    Hypertension    Chronic atrial fibrillation (HCC)    Morbid obesity (HCC)    Acute gout    Chronic diastolic congestive heart failure (HCC)    Cardiomyopathy (HCC)    Diabetes, polyneuropathy [facility]    GERD (gastroesophageal reflux disease)    Hyperlipidemia    Mild persistent asthma without complication    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, right ([facility] )    Varicose veins of lower extremity with ulcer, left ([facility] )    Onychomycosis    Gallstones    Blood alkaline phosphatase increased compared with prior measurement     Past Medical History  Past Medical History:   Diagnosis Date    CH[facility]congestive heart failure) ([facility]     COPD (chronic obstructive pulmonary disease) [facility]     Decubitus ulcer of heel     LAST ASSESSED [id number]    Diabetes mellitus [facility] )     History of varicose veins     Hypertension     Intermittent claudication ([facility])     Neuropathy     Seasonal allergies      Past Surgical History  Past Surgical History:   Procedure Laterality Date    ANGIOPLASTY      W/ FLUOROSC ANGIOGRAPGY PERIPHERAL ARTERY ADDITIONAL  LAST ASSESSED [id number]    ANGIOPLASTY / STENTING FEMORAL      TANSCATH INTRAVASCULAR STENT PLACEMENT PERCUTANEOUS FEMORAL     FULL THICKNESS SKIN GRAFT      TENDON REPAIR             03/23/18 1200   Note Type   Note type Eval only   Restrictions/Precautions   Weight Bearing Precautions Per Order Yes   LLE Weight Bearing Per Order NWB   Other Precautions Fall Risk;Multiple lines   Pain Assessment   Pain Assessment No/denies pain   Home Living   Type of Home House   Home Layout Two level; Able to live on main level with bedroom/bathroom  (1 balaji)   Home Equipment Cane;Walker; Wheelchair-manual   Prior Function   Lives With Other (Comment)  (roommate)   Lifestyle   Autonomy PTA pt states difficulty with his ADLs; states independence with his transfers, ambulation--w/o device; neg falls   Reciprocal Relationships son, limited family support   Service to Others worked for a [address] ([place]) [place]   Subjective   Subjective "I can't stand with just one leg "   ADL   Where Assessed Edge of bed   Eating Assistance 6  Modified independent   Grooming Assistance 6  [address]  Supervision/Setup   LB [address] 4  [address] 5  Supervision/Setup   LB Dressing Assistance 4  [address] 5  Supervision   Rol[address] L 5  Supervision   Supine to Sit 5  Supervision   Additional items LE management;Verbal cues; Increased time required   Transfers   Sit to Stand 3  Moderate assistance   Additional items Assist x 1; Increased time required;Verbal cues   Stand to Sit 4  Minimal assistance   Additional items Assist x 1;Verbal cues; Increased time required   Additional Comments difficulty maintaining L LE NWB status   Functional Mobility   Functional Mobility 3  Moderate assistance   Additional Comments x1   Additional items Rolling walker   Balance   Static Sitting Good   Dynamic Sitting Fair +   Static Standing Fair   Dynamic Standing Fair -   Activity Tolerance   Activity Tolerance Patient limited by fatigue   Medical Staff Made Aware Patrick   RUE Assessment   RUE Assessment WFL   RUE Strength   RUE Overall Strength Within Functional Limits - able to perform ADL tasks with strength  (4+/5 throughout)   LUE Assessment   LUE Assessment WFL   LUE Strength   LUE Overall Strength Within Functional Limits - able to perform ADL tasks with strength  (4+/5 throughout)   Hand Function   Gross Motor Coordination Functional   Fine Motor Coordination Functional   Sensation   Light Touch No apparent deficits   Proprioception   Proprioception No apparent deficits   Vision-Basic Assessment   Current Vision (glasses)   Vision - Complex Assessment   Acuity Able to read clock/calendar on wall without difficulty   Perception   Inattention/Neglect Appears intact   Cognition   Overall Cognitive Status WFL   Arousal/Participation Alert   Attention Within functional limits   Orientation Level Oriented X4   Memory Within functional limits   Following Commands Follows all commands and directions without difficulty   Assessment   Limitation Decreased ADL status; Decreased UE strength;Decreased Safe judgement during ADL;Decreased endurance   Prognosis Fair   Assessment Pt is a 68y/o male admitted to the hospital 2* noted oozing drainage with malodor from his L foot ulcer  Pt with PMH L foot ulcer(2yrs), DM, and medical/diet noncompliance  Pt is currently NWB L LE  PTA pt states difficulty with his ADLs; states independence with his transfers, ambulation--w/o device; neg falls  During initial eval, pt demonstrated deficits with his functional balance, functional mobility, ADL status, activity tolerance(currently fair=15-20mins), b/l UE strength, and transfer safety  Pt would benefit from continued OT tx for the above deficits  3-5xwk/1-2wks  Goals   Patient Goals "to get this foot finally healed "   STG Time Frame 3-5   Short Term Goal #1 Pt will demonstrate proper walker/transfer safety(maintaing strict L LE NWB status) 100% of the time  Short Term Goal #2 Pt will demonstrate mod I with their sit-stand transfers to assist with completion of their LE dressing  Short Term Goal  Pt will demonstrate improved activity tolerance to good(20-30mins) and standing tolerance(maintaining L LE NWB status) to 3-5mins to assist with ADLs     LTG Time Frame (5-10days)   Long Term Goal #1 Pt will demonstrate mod I with their UE and LE bathing/dresssing  Long Term Goal #2 Pt will demonstrate g/g- balance with all functional activities  Long Term Goal Pt will demonstrate improved b/l UE strength by 1/2 MM grade to assist with ADLs/transfers  Plan   Treatment Interventions ADL retraining;Functional transfer training;UE strengthening/ROM; Endurance training;Patient/family training; Compensatory technique education   Goal Expiration Date 04/02/18   Treatment Day 0   OT Frequency 3-5x/wk   Recommendation   OT Discharge Recommendation Short Term Rehab   Barthel Index   Feeding 10   Bathing 0   Grooming Score 5   Dressing Score 5   Bladder Score 10   Bowels Score 10   Toilet Use Score 5   Transfers (Bed/Chair) Score 5   Mobility (Level Surface) Score 0   Stairs Score 0   Barthel Index Score 50   Modified Judy Scale   Modified [place] Scale 4   Miguelito Leach, OT

## 2018-03-23 NOTE — PHYSICAL THERAPY NOTE
PT EVALUATION  Time-In: 46  Time-Out: 5  Total Time:24 minutes    74 y o     [id number]    Morbid obesity ([place] [address] ) [E66 01]  Onychomycosis [B35 1]  [facility]peripheral [facility]) [facility]  ) [I73 9]  Hypertension [I10]  Foot ulcer ([place] [address] ) [L97 509]  Hyperglycemia [R73 9]  Diabetes, polyneuropathy ([place] [address] ) [E11 42]  History of noncompliance with medical treatment [Z91 19]  Heel ulcer due to secondary DM ([place] [address] ) [L39 255, L97 409]  Diabetes mellitus type 2, uncontrolled ([place] [address] ) [E11 65]  Acute renal injury ([place] [address] ) [N17 9]  Ambulatory dysfunction [R26 2]    Past Medical History:   Diagnosis Date    [facility]     COPD (chronic obstructive pulmonary disease) ([place] [address] )     Decubitus ulcer of heel     LAST ASSESSED [id number]    [facility]  )     History of varicose veins     Hypertension     Intermittent claudication ([place] [address] )     Neuropathy     Seasonal allergies          Past Surgical History:   Procedure Laterality Date    ANGIOPLASTY      W/ FLUOROSC ANGIOGRAPGY PERIPHERAL ARTERY ADDITIONAL  LAST ASSESSED [id number]    ANGIOPLASTY / STENTING FEMORAL      TANSCATH INTRAVASCULAR STENT PLACEMENT PERCUTANEOUS FEMORAL     FULL THICKNESS SKIN GRAFT      TENDON REPAIR        03/23/18 1159   Note Type   Note type Eval only   Pain Assessment   Pain Assessment No/denies pain   Pain Score No Pain   Home Living   Type of Home House   Home Layout Multi-level; Able to live on main level with bedroom/bathroom;Stairs to enter without rails  (1 [address])   Home Equipment Walker;Cane;Wheelchair-manual;Hospital bed   Additional Comments Pt reports that his house is not big enough for his equipment   Prior Function   Level of [place] Independent with ADLs and functional mobility   Lives With Friend(s)  (often home alone; sister just moved out)   ADL [address] in the last 6 months 0   Restrictions/Precautions   Weight Bearing Precautions Per Order Yes   LLE Weight Bearing Per Order NWB Other Precautions Fall Risk;WBS;Multiple lines   General   Additional Pertinent History prolonged medical history regardining L foot (over 2 years)   Family/Caregiver Present No   Cognition   Overall Cognitive Status WFL   Arousal/Participation Alert   Orientation Level Oriented X4   Memory Within functional limits   Following Commands Follows multistep commands with increased time or repetition   RUE Assessment   RUE Assessment WFL   LUE Assessment   LUE Assessment WFL   RLE Assessment   RLE Assessment X   Strength RLE   RLE Overall Strength 4-/5   LLE Assessment   LLE Assessment X   Strength LLE   LLE Overall Strength 3+/5   Coordination   Movements are Fluid and Coordinated 1   Sensation X   Light Touch   RLE Light Touch Impaired   RLE Light Touch Comments decreased sensation dsital RLE   LLE Light Touch Impaired   LLE Light Touch Comments decreased sensation dsital RLE   Sharp/Dull   RLE Sharp/Dull Not tested   LLE Sharp/Dull Not tested   Proprioception   RLE Proprioception Impaired   LLE Proprioception Impaired   Bed Mobility   Supine to Sit 5  Supervision   Additional items Assist x 1;Bedrails; Increased time required   Sit to Supine Unable to assess   Additional Comments pt left in bedside recliner chair at end of PT session   Transfers   Sit to Stand 3  Moderate assistance   Additional items Assist x 1; Increased time required;Verbal cues   Stand to Sit 3  Moderate assistance   Additional items Assist x 1; Increased time required;Verbal cues   Stand pivot 3  Moderate assistance   Additional items Assist x 1;Verbal cues; Increased time required  (w/ RW)   Additional Comments Pt needing increased cueing to maintain NWB LLE  Pt with decreased compliance d/t difficulty and weakness   Ambulation/Elevation   Gait pattern (not observed   difficulty maintaining NWB LLE)   Balance   Static Sitting Normal   Dynamic Sitting Good   Static Standing Poor +   Dynamic Standing Poor +   Ambulatory Poor +   Endurance Deficit Endurance Deficit Yes   Activity Tolerance   Activity Tolerance Patient limited by fatigue  (decreased ability to maintain NWB LLE)   Nurse Mary Ellen Pope, RN   Assessment   Prognosis Good   Problem List Decreased strength;Decreased endurance; Impaired balance;Decreased mobility; Decreased safety awareness; Impaired sensation;Obesity; Decreased skin integrity;Orthopedic restrictions   Assessment Pt is 73 yo male admitted from home w/ friend for c/o L foot ulcer  Pt has prolonged distory of L foot ulcer complications  PTA patient reports that he does his own functional mobility and ADLs at home  He has RW, cane, and w/c but his house is not big enough to use equipment  Pt denies any recent falls and no complaints of pain  Pt demonstrates current level of supervision supine>sit, sit<>stand bed>chair w/ RW modAx1  Pt with difficulty maintaining NWB LLE and quick to fatigue d/t decreased strength  Pt would benefit from continued skilled PT for deficits in strength, balance, functional mobility, safety w/ mobility, gait and functional endurance  PT recommend short term rehab  Barriers to Discharge Decreased caregiver support; Inaccessible home environment   Barriers to Discharge Comments equipment does not fit in house, pt is now NWB LLE, decreased support at home often home alone   Goals   Patient Goals to get stronger   STG Expiration Date 04/02/18   Short Term Goal #1 In 10 days patient will demonstrate: bed mob (I), sit<>stand and functional transfers S w/ RW and NWB LLE, 50 feet [place] w/ RW LLE NWB, increase mmt by 1/2 grade for functional mobility and gait, demonstrate static and dynamic standing balance w/ RW NWB LLE w/ all functional activities, [id number] feet w/c and w/c safety/management (I)   Plan   Treatment/Interventions Functional transfer training; Therapeutic exercise; Endurance training;Patient/family training;Bed mobility;Spoke to nursing;OT   PT Frequency 5x/wk   Recommendation   Recommendation Short-term skilled PT   Equipment Recommended Wheelchair;Walker   PT - OK to Discharge Yes   Additional Comments yes to rehab, no to home   Modified [place] Scale   Modified [place] Scale 4   Barthel Index   Feeding 10   Bathing 0   Grooming Score 5   Dressing Score 5   Bladder Score 10   Bowels Score 10   Toilet Use Score 5   Transfers (Bed/Chair) Score 5   Mobility (Level Surface) Score 0   Stairs Score 0   Barthel Index Score 50   History: co - morbidities, fall risk, use of assistive device, multiple lines  Exam: impairments in locomotion, musculoskeletal, balance,posture, joint integrity, skin integrity, strength, functional mobility and endurance, gait; 50 Barthel  Clinical: unstable/unpredictable; WBS, fall risk, multiple lines  Complexity:high        Jenni Dubon, PT ,DPT

## 2018-03-23 NOTE — PLAN OF CARE
Problem: OCCUPATIONAL THERAPY ADULT  Goal: Performs self-care activities at highest level of function for planned discharge setting  See evaluation for individualized goals  Treatment Interventions: ADL retraining, Functional transfer training, UE strengthening/ROM, Endurance training, Patient/family training, Compensatory technique education          See flowsheet documentation for full assessment, interventions and recommendations  Limitation: Decreased ADL status, Decreased UE strength, Decreased Safe judgement during ADL, Decreased endurance  Prognosis: Fair  Assessment: Pt is a 66y/o male admitted to the hospital 2* noted oozing drainage with malodor from his L foot ulcer  Pt with PMH L foot ulcer(2yrs), DM, and medical/diet noncompliance  Pt is currently NWB L LE  PTA pt states difficulty with his ADLs; states independence with his transfers, ambulation--w/o device; neg falls  During initial eval, pt demonstrated deficits with his functional balance, functional mobility, ADL status, activity tolerance(currently fair=15-20mins), b/l UE strength, and transfer safety  Pt would benefit from continued OT tx for the above deficits  3-5xwk/1-2wks        OT Discharge Recommendation: Short Term Rehab

## 2018-03-23 NOTE — PLAN OF CARE
Problem: DISCHARGE PLANNING - CARE MANAGEMENT  Goal: Discharge to post-acute care or home with appropriate resources  INTERVENTIONS:  - Conduct assessment to determine patient/family and health care team treatment goals, and need for post-acute services based on payer coverage, community resources, and patient preferences, and barriers to discharge  - Address psychosocial, clinical, and financial barriers to discharge as identified in assessment in conjunction with the patient/family and health care team  - Arrange appropriate level of post-acute services according to patients   needs and preference and payer coverage in collaboration with the physician and health care team  - Communicate with and update the patient/family, physician, and health care team regarding progress on the discharge plan  - Arrange appropriate transportation to post-acute venues  Outcome: Progressing  CM provided a Meals on Wheels application for [facility] to use to show him what type of information the agency will require to process a application, however, patient lives in [facility]  CM printed off the site page of [facility] on Wheels program so he would have their phone number/address to call to get one of their application either mailed to him, or perhaps fill it out with them over the phone  PT/OT have both recommended STR; list provided to patient to determine choices for when he is closer to discharge  He stated that he had been at [facility] in the past, and [facility] and he was agreeable to go to either of them again  CM asked him to chose another 1 or 2 options in case they do not have a bed  Referrals to [facility] were sent to put him on their radar as KATY is unsure what day a discharge would occur

## 2018-03-23 NOTE — CASE MANAGEMENT
Initial Clinical Review    Admission: Date/Time/Statement: 3/23/18 @ 0420     Orders Placed This Encounter   Procedures    Inpatient Admission (expected length of stay for this patient is greater than two midnights)     Standing Status:   Standing     Number of Occurrences:   1     Order Specific Question:   Admitting Physician     Answer:   Jude Peralta [[id number]]     Order Specific Question:   Level of Care     Answer:   Med Surg [16]     Order Specific Question:   Estimated length of stay     Answer:   More than 2 Midnights     Order Specific Question:   Certification     Answer:   I certify that inpatient services are medically necessary for this patient for a duration of greater than two midnights  See H&P and MD Progress Notes for additional information about the patient's course of treatment  ED: Date/Time/Mode of Arrival:   ED Arrival Information     Expected Arrival Acuity Means of Arrival Escorted By Service Admission Type    - 3/23/2018 01:18 Urgent Walk-In Self General Medicine Urgent    Arrival Complaint    foot problem        Chief Complaint:   Chief Complaint   Patient presents with    Foot Ulcer     pt c/o left foot heel wound  pt is a diabetic and has not been seen at the wound center for the past 2 months  pt called  today and was told" if the heel is oozing and painful he should come to the ED" Pt denies any other issues  History of Illness: 74 y o  male who presents with left foot ulcer  He has a history of medication and diet noncompliance  He lives at home  His phone was recently turned off by his sister  There are notes from his PCP in the chart that they wasted to get agency on aging involved  He states he had a foot ulcer that healed in the past when he was seeing podiatry  He was admitted in October and was sent to a nursing home afterward  Since he has been home he has been taking his medications intermittently   He states he can't afford his meds and has to "stretch them out"  His foot started draining again 2 weeks ago  He also has noticed heart palpitations  He has a history of afib  He has refused coumadin in the past      Physical Exam   Per H&P  Constitutional: He is oriented to person, place, and time  No distress  Generally poor hygiene   HENT:   Head: Normocephalic and atraumatic  Eyes: Conjunctivae are normal  Pupils are equal, round, and reactive to light  Neck: Normal range of motion  Neck supple  No JVD present  No tracheal deviation present  No thyromegaly present  Cardiovascular: Normal rate  Irregular rhythm   Pulmonary/Chest: Effort normal    Decreased breath sounds bilaterally   Abdominal: Soft  Bowel sounds are normal  There is no tenderness  obese   Musculoskeletal:   Left leg edema   Lymphadenopathy:     He has no cervical adenopathy  Neurological: He is alert and oriented to person, place, and time  Skin: He is not diaphoretic  Left foot with chronic heel ulcer with foul smelling drainage   Psychiatric: He has a normal mood and affect  His behavior is normal      ED Vital Signs:   ED Triage Vitals [03/23/18 0128]   Temperature Pulse Respirations Blood Pressure SpO2   (!) 97 4 °F (36 3 °C) 85 18 148/84 96 %      Temp Source Heart Rate Source Patient Position - Orthostatic VS BP Location FiO2 (%)   Oral Monitor Sitting Right arm --      Pain Score       2        Wt Readings from Last 1 Encounters:   03/23/18 (!) 158 kg (348 lb)       Abnormal Labs/Diagnostic Test Results: NT-proBNP 1,074,   Sed rate 26,   Cl 99,   BUN 33,   Cr 1 53,   Glu 422,   Alk phos 194,   Alb 3 4  Urine:  >= 1000 Glu  Left Foot Xray:  Diffuse soft tissue swelling of the visualized lower extremity without acute osseous findings   Specifically, no radiographic evidence for osteomyelitis    CXR: No acute abnormality in the chest   Left Ankle Xray: Diffuse soft tissue swelling of the visualized lower extremity without acute osseous findings   Specifically, no radiographic evidence for osteomyelitis  BC's  Pending    ED Treatment:   Medication Administration from 03/23/2018 0118 to 03/23/2018 7919       Date/Time Order Dose Route Action Action by Comments     03/23/2018 0450 vancomycin (VANCOCIN) 1,750 mg in sodium chloride 0 9 % 500 mL IVPB 1,750 mg Intravenous New Bag Belem Otoole RN           Past Medical/Surgical History:    Active Ambulatory Problems     Diagnosis Date Noted    Diabetic foot ulcer ([place] [address] ) 09/15/2016    COPD (chronic obstructive pulmonary disease) ([place] [address] ) 09/15/2016    Diabetes mellitus type 2, uncontrolled ([place] [address] ) 09/15/2016    Gout 09/15/2016    PAD (peripheral artery disease) ([place] [address] ) 09/15/2016    Hypertension 09/15/2016    Chronic atrial fibrillation ([place] [address] ) 10/09/2017    Morbid obesity ([place] [address] ) 10/09/2017    Acute gout 10/09/2017    Chronic diastolic congestive heart failure ([place] [address] ) 10/11/2017    Cardiomyopathy ([place] [address] ) 02/17/2016    Diabetes, polyneuropathy ([place] [address] ) 08/21/2014    GERD (gastroesophageal reflux disease) 07/24/2014    Hyperlipidemia 07/24/2014    Mild persistent asthma without complication 22/80/9304    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, right ([facility] ) 04/04/2016    Varicose veins of lower extremity with ulcer, left ([place] [address] ) 02/16/2017    Onychomycosis 10/27/2015    Gallstones 07/05/2017    Blood alkaline phosphatase increased compared with prior measurement 06/05/2017     Resolved Ambulatory Problems     Diagnosis Date Noted    CHF (congestive heart failure) ([facility] ) 09/15/2016    Diarrhea 09/17/2016     Past Medical History:   Diagnosis Date    CHF (congestive heart failure) (HCC)     COPD (chronic obstructive pulmonary disease) (HCC)     Decubitus ulcer of heel     Diabetes mellitus ([facility] )     History of varicose veins     Hypertension     Intermittent claudication (HCC)     Neuropathy     Seasonal allergies        Admitting Diagnosis: Morbid obesity ([place] [address] ) [E66 01]  Onychomycosis [B35 1]  PVD (peripheral vascular disease) ([place] [address] ) [I73 9]  Hypertension [I10]  Foot ulcer ([place] [address] ) [L97 509]  Hyperglycemia [R73 9]  Diabetes, polyneuropathy ([place] [address] ) [E11 42]  History of noncompliance with medical treatment [Z91 19]  Heel ulcer due to secondary DM ([place] [address] ) [X94 827, L97 409]  Diabetes mellitus type 2, uncontrolled ([place] [address] ) [E11 65]  Acute renal injury ([place] [address] ) [N17 9]  Ambulatory dysfunction [R26 2]    Age/Sex: 74 y o  male    Assessment/Plan:   Active Problems:    Diabetic foot ulcer ([place] [address] )    COPD (chronic obstructive pulmonary disease) ([place] [address] )    Diabetes mellitus type 2, uncontrolled ([place] [address] )    Gout    Hypertension    Chronic atrial fibrillation (HCC)    Chronic diastolic congestive heart failure (HCC)    GERD (gastroesophageal reflux disease)    Hyperlipidemia    Mild persistent asthma without complication        Plan for the Primary Problem(s):  · Diabetic foot ulcer left foot  ? Admit to med/surg  Started on vanco in ED, will continue  Consult wound care and podiatry  Patient has been noncompliant with medications and treatments at home  He was unable to afford the diabetic shoes  Blood sugars have been chronically elevated       Plan for Additional Problems:   · COPD/asthma- continue albuterol and restart advair  He states he only has his rescue inhaler at home  · Uncontrolled DM- check A1C  Patient has not been compliant with his home insulin or diabetic diet  Order prescribed home levemir dose and order accuchecks with coverage  · Gout- on allopurinol and colchicine   Had been on prednisone at one point but only a medrol dose pack that he completed  · HTN- continue cozaar and metoprolol  · afib- seen by Dr Re Snider in the past  Has historically refused coumadin        VTE Prophylaxis: Heparin  / reason for no mechanical VTE prophylaxis leg wounds   Code Status: full code  POLST: There is no POLST form on file for this patient (pre-hospital)     Anticipated Length of Stay:  Patient will be admitted on an Inpatient basis with an anticipated length of stay of  Greater than 2 midnights  Justification for Hospital Stay: patient requires IV vanco and podiatry consult     Admission Orders:  [address] Nephrology  PT / OT Eval  CBC,  BMP,  Mag  Urine urea nitrogen  Urine Creat  And random cl  Prevalon Boot  NWB  Xray Left Heel  Consult ID    Scheduled Meds:   Current Facility-Administered Medications:  acetaminophen 650 mg Oral Q6H PRN Javiyasmin Clarke, PA-C   albuterol 2 puff Inhalation Q6H PRN Javiyasmin Clarke, PA-C   allopurinol 300 mg Oral Daily Javiyasmin Clarke, PA-C   aluminum-magnesium hydroxide-simethicone 15 mL Oral Q6H PRN Javiyasmin Clarke, PA-C   bacitracin 1 small application Topical BID [facility] Rascon   colchicine 1 2 mg Oral Daily Javiyasmin Clarke, PA-C   fluticasone-salmeterol 1 puff Inhalation Q12H [facility] LESLEY Goldstein-SHANEKA   furosemide 40 mg Intravenous BID (diuretic) LESLEY Goldstein-SHANEKA   heparin (porcine) 5,000 Units Subcutaneous Q8H [facility] LESLEY Goldstein-SHANEKA   insulin detemir 50 Units Subcutaneous Q12H [facility] Gwen Kwon MD   insulin lispro 1-5 Units Subcutaneous HS Javi Clarke PA-C   insulin lispro 15 Units Subcutaneous TID With Meals Gwen Kwon MD   insulin lispro 2-12 Units Subcutaneous TID AC Javi Clarke PA-C   losartan 50 mg Oral Daily Javi Clarke, PA-C   metoprolol tartrate 25 mg Oral BID LESLEY Goldstein-SHANEKA   ondansetron 4 mg Intravenous Q6H PRN Javi Clarke, PA-C   spironolactone 25 mg Oral Daily Javi Clarke, PA-C   vancomycin 15 mg/kg (Adjusted) Intravenous Q12H Javi Clarke PA-C     Continuous Infusions:    PRN Meds:   acetaminophen    albuterol    aluminum-magnesium hydroxide-simethicone    ondansetron    Debrided @ bedside by Podiatry  Thank you,  [address] [facility] in the [facility] by [employer] Hawley for 2017  Network Utilization Review Department  Phone: [phone];  Fax [phone]  ATTENTION: The Network Utilization Review Department is now centralized for our 7 Facilities  Make a note that we have a new phone and fax numbers for our Department  Please call with any questions or concerns to [phone] and carefully follow the prompts so that you are directed to the right person  All voicemails are confidential  Fax any determinations, approvals, denials, and requests for initial or continue stay review clinical to [phone]  Due to HIGH CALL volume, it would be easier if you could please send faxed requests to expedite your requests and in part, help us provide discharge notifications faster

## 2018-03-24 LAB
ANION GAP SERPL CALCULATED.3IONS-SCNC: 12 MMOL/L (ref 4–13)
BUN SERPL-MCNC: 29 MG/DL (ref 5–25)
CALCIUM SERPL-MCNC: 9.1 MG/DL (ref 8.3–10.1)
CHLORIDE SERPL-SCNC: 101 MMOL/L (ref 100–108)
CO2 SERPL-SCNC: 25 MMOL/L (ref 21–32)
CREAT SERPL-MCNC: 1.04 MG/DL (ref 0.6–1.3)
GFR SERPL CREATININE-BSD FRML MDRD: 75 ML/MIN/1.73SQ M
GLUCOSE SERPL-MCNC: 142 MG/DL (ref 65–140)
GLUCOSE SERPL-MCNC: 152 MG/DL (ref 65–140)
GLUCOSE SERPL-MCNC: 157 MG/DL (ref 65–140)
GLUCOSE SERPL-MCNC: 191 MG/DL (ref 65–140)
GLUCOSE SERPL-MCNC: 308 MG/DL (ref 65–140)
POTASSIUM SERPL-SCNC: 4.1 MMOL/L (ref 3.5–5.3)
SODIUM SERPL-SCNC: 138 MMOL/L (ref 136–145)

## 2018-03-24 PROCEDURE — 99232 SBSQ HOSP IP/OBS MODERATE 35: CPT | Performed by: PHYSICIAN ASSISTANT

## 2018-03-24 PROCEDURE — 99231 SBSQ HOSP IP/OBS SF/LOW 25: CPT | Performed by: INTERNAL MEDICINE

## 2018-03-24 PROCEDURE — 82948 REAGENT STRIP/BLOOD GLUCOSE: CPT

## 2018-03-24 PROCEDURE — 80048 BASIC METABOLIC PNL TOTAL CA: CPT | Performed by: INTERNAL MEDICINE

## 2018-03-24 PROCEDURE — 99232 SBSQ HOSP IP/OBS MODERATE 35: CPT | Performed by: INTERNAL MEDICINE

## 2018-03-24 RX ORDER — FUROSEMIDE 40 MG/1
80 TABLET ORAL DAILY
Status: DISCONTINUED | OUTPATIENT
Start: 2018-03-24 | End: 2018-03-26 | Stop reason: HOSPADM

## 2018-03-24 RX ADMIN — INSULIN LISPRO 2 UNITS: 100 INJECTION, SOLUTION INTRAVENOUS; SUBCUTANEOUS at 16:39

## 2018-03-24 RX ADMIN — METOPROLOL TARTRATE 25 MG: 25 TABLET ORAL at 17:45

## 2018-03-24 RX ADMIN — INSULIN LISPRO 2 UNITS: 100 INJECTION, SOLUTION INTRAVENOUS; SUBCUTANEOUS at 08:02

## 2018-03-24 RX ADMIN — METOPROLOL TARTRATE 25 MG: 25 TABLET ORAL at 08:03

## 2018-03-24 RX ADMIN — INSULIN DETEMIR 50 UNITS: 100 INJECTION, SOLUTION SUBCUTANEOUS at 08:03

## 2018-03-24 RX ADMIN — HEPARIN SODIUM 5000 UNITS: 5000 INJECTION, SOLUTION INTRAVENOUS; SUBCUTANEOUS at 05:13

## 2018-03-24 RX ADMIN — INSULIN LISPRO 1 UNITS: 100 INJECTION, SOLUTION INTRAVENOUS; SUBCUTANEOUS at 21:07

## 2018-03-24 RX ADMIN — Medication 1 SMALL APPLICATION: at 17:45

## 2018-03-24 RX ADMIN — INSULIN LISPRO 15 UNITS: 100 INJECTION, SOLUTION INTRAVENOUS; SUBCUTANEOUS at 12:19

## 2018-03-24 RX ADMIN — Medication 1 SMALL APPLICATION: at 15:18

## 2018-03-24 RX ADMIN — FLUTICASONE PROPIONATE AND SALMETEROL 1 PUFF: 50; 250 POWDER RESPIRATORY (INHALATION) at 08:03

## 2018-03-24 RX ADMIN — FLUTICASONE PROPIONATE AND SALMETEROL 1 PUFF: 50; 250 POWDER RESPIRATORY (INHALATION) at 21:06

## 2018-03-24 RX ADMIN — FUROSEMIDE 80 MG: 40 TABLET ORAL at 11:00

## 2018-03-24 RX ADMIN — HEPARIN SODIUM 5000 UNITS: 5000 INJECTION, SOLUTION INTRAVENOUS; SUBCUTANEOUS at 15:16

## 2018-03-24 RX ADMIN — ALLOPURINOL 100 MG: 100 TABLET ORAL at 08:03

## 2018-03-24 RX ADMIN — INSULIN DETEMIR 50 UNITS: 100 INJECTION, SOLUTION SUBCUTANEOUS at 19:25

## 2018-03-24 RX ADMIN — INSULIN LISPRO 8 UNITS: 100 INJECTION, SOLUTION INTRAVENOUS; SUBCUTANEOUS at 12:19

## 2018-03-24 RX ADMIN — INSULIN LISPRO 15 UNITS: 100 INJECTION, SOLUTION INTRAVENOUS; SUBCUTANEOUS at 16:39

## 2018-03-24 RX ADMIN — INSULIN LISPRO 15 UNITS: 100 INJECTION, SOLUTION INTRAVENOUS; SUBCUTANEOUS at 08:02

## 2018-03-24 RX ADMIN — HEPARIN SODIUM 5000 UNITS: 5000 INJECTION, SOLUTION INTRAVENOUS; SUBCUTANEOUS at 22:30

## 2018-03-24 RX ADMIN — HYOSCYAMINE SULFATE 1 APPLICATION: 16 SOLUTION at 15:17

## 2018-03-24 NOTE — PROGRESS NOTES
Progress Note - Infectious Disease   Yoel Cummins 74 y o  male MRN: [id number]  Unit/Bed#: [place] 68 2 [facility] [place] [address] [id number] Encounter: [id number]      Impression/Recommendations:  1  Draining left heel ulcer  On examination, this appears to be chronic, with no evidence of cellulitis  Patient has no fever or leukocytosis  I suspect the development and worsening of ulcer is due to a combination of poorly controlled leg edema and extremely poor hygiene  No antibiotic is necessary for this  All patient is local wound care  More specifically, when he needs to most is basic hygiene (eg   shower or bathe on a regular basis)  Observe off antibiotic  Local wound care  Discussed with patient in detail regarding the need for basic hygiene      2   Venous stasis with poorly controlled leg edema  This is most likely secondary to sedentary lifestyle, exacerbated by morbid obesity  Patient need to lose weight  He has exercised  Any to keep his legs elevated  He may benefit from compression stockings short-term  Keep legs elevated to control edema      3  CKD      4   Poorly compliant DM, with hyperglycemia on admission      5  Poor general hygiene      Discussed with patient in detail regarding all above  Antibiotics:  Off antibiotic    Subjective:  Patient is comfortable  No left heel pain  Temperature stays down  No chills  Objective:  Vitals:  HR:  [65-85] 68  Resp:  [18] 18  BP: (128-154)/(62-88) 154/88  SpO2:  [94 %-96 %] 94 %  Temp (24hrs), Av 8 °F (36 6 °C), Min:97 7 °F (36 5 °C), Max:97 9 °F (36 6 °C)  Current: Temperature: 97 9 °F (36 6 °C)    Physical Exam:     General: Awake, alert, cooperative, no distress  Lungs: Expansion symmetric, no rales, no wheezing, respirations unlabored  Heart[de-identified]  Regular rate and rhythm, S1 and S2 normal, no murmur  Abdomen: Soft, nondistended, non-tender, bowel sounds active all four quadrants,        no masses, no organomegaly     Extremities: Stable leg edema  Left foot/heel with dressing in place  Dressing is dry  Stable venous stasis changes  Nontender  Skin:  No rash  Invasive Devices     Peripheral Intravenous Line            Peripheral IV 03/23/18 Right Antecubital 1 day                Labs studies:   I have personally reviewed pertinent labs  Results from last 7 days  Lab Units 03/24/18  0516 03/23/18  0236   SODIUM mmol/L 138 136   POTASSIUM mmol/L 4 1 4 8   CHLORIDE mmol/L 101 99*   CO2 mmol/L 25 27   ANION GAP mmol/L 12 10   BUN mg/dL 29* 33*   CREATININE mg/dL 1 04 1 53*   EGFR ml/min/1 73sq m 75 47   GLUCOSE RANDOM mg/dL 142* 422*   CALCIUM mg/dL 9 1 9 5   AST U/L  --  26   ALT U/L  --  39   ALK PHOS U/L  --  194*   TOTAL PROTEIN g/dL  --  8 1   BILIRUBIN TOTAL mg/dL  --  0 38       Results from last 7 days  Lab Units 03/23/18  0618 03/23/18  0236   WBC Thousand/uL 6 52 5 99   HEMOGLOBIN g/dL 13 3 14 2   PLATELETS Thousands/uL 185  185 183       Results from last 7 days  Lab Units 03/23/18  0236   BLOOD CULTURE  No Growth at 24 hrs  No Growth at 24 hrs  Imaging Studies:   I have personally reviewed pertinent imaging study reports and images in PACS  EKG, Pathology, and Other Studies:   I have personally reviewed pertinent reports

## 2018-03-24 NOTE — PROGRESS NOTES
[facility] [place] Internal Medicine Progress Note  Patient: Jaspal Medina 74 y o  male   MRN: [id number]  PCP: Sherif Iniguez MD  Unit/Bed#: [place] 68 2 [facility] [place] [facility] [id number] Encounter: [id number]  Date Of Visit: 18    Assessment and plan:    Principal Problem:    Diabetic foot ulcer ([facility] )- with no secondary infection  Local wound care per podiatry  Emphasized medication compliance and proper hygiene  Active Problems:    Diabetes mellitus type 2, uncontrolled ([place] [address] )- uncontrolled due to non-compliance  Levemir 50 unitd BID restarted  Continue humalog 15 units tid with meals and adjust as we go along    Chronic atrial fibrillation ([place] [address] )- refused anticoagulation in the past and during this admission  Rate controlled on metoprolol    Chronic diastolic congestive heart failure ([place] [address] )- with peripheral edema  Agree with lasix 80 mg daily per renal    Gout- continue allopurinol 100 mg daily    Hypertension-BP at goal on metoprolol 25 mg BID    Asthma without exacerbation- continue advair BID    Morbid obesity (HCC)          VTE Pharmacologic Prophylaxis:   Pharmacologic: Heparin  Mechanical VTE Prophylaxis in Place: No    Discussions with Specialists or Other Care Team Provider: Discussed with ID Dr Florian Washburn    Education and Discussions with Family / Patient: Spoke with significant other at bedside    Time Spent for Care: 20 minutes  More than 50% of total time spent on counseling and coordination of care as described above  Current Length of Stay: 1 day(s)    Current Patient Status: Inpatient   Certification Statement: The patient will continue to require additional inpatient hospital stay due to placement    Discharge Plan: STR when available  He is agreeable    Code Status: Level 1 - Full Code      Subjective:   He feels better after showering and cleaning up  Denies pain   No events overnight    Objective:     Vitals:   Temp (24hrs), Av 1 °F (36 7 °C), Min:97 9 °F (36 6 °C), Max:98 4 °F (36 9 °C)    HR:  [65-83] 83  Resp: [18-20] 20  BP: (128-154)/(69-88) 133/69  SpO2:  [93 %-96 %] 93 %  Body mass index is 43 65 kg/m²  Input and Output Summary (last 24 hours): Intake/Output Summary (Last 24 hours) at 03/24/18 1607  Last data filed at 03/24/18 1607   Gross per 24 hour   Intake                0 ml   Output             2525 ml   Net            -2525 ml       Physical Exam:     Physical Exam   Constitutional: He is oriented to person, place, and time  No distress  Morbidly obese   HENT:   Head: Normocephalic and atraumatic  Mouth/Throat: No oropharyngeal exudate  Eyes: No scleral icterus  Cardiovascular:   Irregular     Pulmonary/Chest: Effort normal and breath sounds normal  No stridor  No respiratory distress  He has no wheezes  Abdominal: Soft  Bowel sounds are normal  He exhibits no distension  There is no tenderness  Musculoskeletal: He exhibits edema  Neurological: He is alert and oriented to person, place, and time  Skin: He is not diaphoretic  Bilateral venous stasis dermatitis, left > right   Psychiatric: He has a normal mood and affect  Additional Data:     Labs:      Results from last 7 days  Lab Units 03/23/18  0618 03/23/18  0236   WBC Thousand/uL 6 52 5 99   HEMOGLOBIN g/dL 13 3 14 2   HEMATOCRIT % 40 9 43 5   PLATELETS Thousands/uL 185  185 183   NEUTROS PCT %  --  65   LYMPHS PCT %  --  24   MONOS PCT %  --  9   EOS PCT %  --  2       Results from last 7 days  Lab Units 03/24/18  0516 03/23/18  0236   SODIUM mmol/L 138 136   POTASSIUM mmol/L 4 1 4 8   CHLORIDE mmol/L 101 99*   CO2 mmol/L 25 27   BUN mg/dL 29* 33*   CREATININE mg/dL 1 04 1 53*   CALCIUM mg/dL 9 1 9 5   TOTAL PROTEIN g/dL  --  8 1   BILIRUBIN TOTAL mg/dL  --  0 38   ALK PHOS U/L  --  194*   ALT U/L  --  39   AST U/L  --  26   GLUCOSE RANDOM mg/dL 142* 422*       Results from last 7 days  Lab Units 03/23/18  0236   INR  1 00       * I Have Reviewed All Lab Data Listed Above  * Additional Pertinent Lab Tests Reviewed:  All Labs Within Last 24 Hours Reviewed    Imaging:    Imaging Reports Reviewed Today Include: no new imaging      Recent Cultures (last 7 days):       Results from last 7 days  Lab Units 03/23/18  0236   BLOOD CULTURE  No Growth at 24 hrs  No Growth at 24 hrs  Last 24 Hours Medication List:     Current Facility-Administered Medications:  acetaminophen 650 mg Oral Q6H PRN [facility], PA-SHANEKA   albuterol 2 puff Inhalation Q6H PRN [facility], JOEY   allopurinol 100 mg Oral Daily Deyanira Alarcon MD   aluminum-magnesium hydroxide-simethicone 15 mL Oral Q6H PRN [facility], JOEY   bacitracin 1 small application Topical BID Jannice Butts   fluticasone-salmeterol 1 puff Inhalation Q12H [facility], JOEY   furosemide 80 mg Oral Daily Tiesha Villa PA-C   heparin (porcine) 5,000 Units Subcutaneous Q8H [facility], JOEY   insulin detemir 50 Units Subcutaneous Q12H [facility] Deyanira Alarcon MD   insulin lispro 1-5 Units Subcutaneous HS [facility], JOEY   insulin lispro 15 Units Subcutaneous TID With Meals Deyanira Alarcon MD   insulin lispro 2-12 Units Subcutaneous TID AC [facility], JOEY   metoprolol tartrate 25 mg Oral BID [facility], JOEY   ondansetron 4 mg Intravenous Q6H PRN [facility], PA-C   sodium hypochlorite 1 application Irrigation Daily Kavya Cruz DPM        Today, Patient Was Seen By: Deyanira Alarcon MD    ** Please Note: Dragon 360 Dictation voice to text software may have been used in the creation of this document   **

## 2018-03-24 NOTE — PLAN OF CARE
GASTROINTESTINAL - ADULT     Minimal or absence of nausea and/or vomiting Progressing     Maintains or returns to baseline bowel function Progressing     Maintains adequate nutritional intake Progressing        INFECTION - ADULT     Absence or prevention of progression during hospitalization Progressing     Absence of fever/infection during neutropenic period Progressing        METABOLIC, FLUID AND ELECTROLYTES - ADULT     Electrolytes maintained within normal limits Progressing     Fluid balance maintained Progressing     Glucose maintained within target range Progressing        Nutrition/Hydration-ADULT     Nutrient/Hydration intake appropriate for improving, restoring or maintaining nutritional needs Progressing        PAIN - ADULT     Verbalizes/displays adequate comfort level or baseline comfort level Progressing        Potential for Falls     Patient will remain free of falls Progressing        SAFETY ADULT     Maintain or return to baseline ADL function Progressing     Maintain or return mobility status to optimal level Progressing        SKIN/TISSUE INTEGRITY - ADULT     Skin integrity remains intact Progressing     Incision(s), wounds(s) or drain site(s) healing without S/S of infection Progressing

## 2018-03-24 NOTE — PROGRESS NOTES
Progress Note - Podiatry  Yoel Renetta Cummins 74 y o  male MRN: [id number]  Unit/Bed#: [facility] 2 -01 Encounter: [id number]    Assessment:  1  Ulceration of the left plantar heel 2/2 DM with neuropathy, richard stage 2  2  Venous stasis  3  Cellulitis  4  DM type II  5  Medical noncompliance  6  Excoriations of the anterior legs     Plan:  - left foot dressing was left intact today  Will change dressing 3/26  Nursing communication to wash bilateral legs with Hibiclens, and change left foot dressing daily with Dakin's wet to dry  Erythema still present on the bilateral lower extremities  However, most likely due to chronic venous stasis changes  - x-rays were reviewed  No radiographic evidence for osteomyelitis  - encourage patient to continue elevating his bilateral lower extremities to reduce edema, and wear global ped issue on the left foot to offload heel   - observe off antibiotics per Infectious Disease   - WBS: NWB LLE      Subjective/Objective   Chief Complaint:   Chief Complaint   Patient presents with    Foot Ulcer     pt c/o left foot heel wound  pt is a diabetic and has not been seen at the wound center for the past 2 months  pt called  today and was told" if the heel is oozing and painful he should come to the ED" Pt denies any other issues  Subjective: 74 y o  y/o male was seen and evaluated at bedside in no acute distress, nontoxic in appearance  Patient denies any recent nausea, vomiting, fever, chills, shortness of breath, chest pains       Blood pressure 154/88, pulse 68, temperature 97 9 °F (36 6 °C), temperature source Temporal, resp  rate 18, height 6' 3" (1 905 m), weight (!) 158 kg (349 lb 3 3 oz), SpO2 94 %  ,Body mass index is 43 65 kg/m²      Invasive Devices     Peripheral Intravenous Line            Peripheral IV 03/23/18 Right Antecubital 1 day                Physical Exam:   General: Alert, cooperative and no distress  Lungs: Non labored breathing  Heart: Positive S1, S2  Abdomen: Soft, non-tender  Extremity:     NVS at baseline B/l  MSK function at B/l  No calf tenderness noted B/l  Bilateral lower extremity erythema most likely due to chronic venous stasis changes  Dressing was left intact on the left foot  Lab, Imaging and other studies:   I have personally reviewed pertinent lab results  , CBC: No results found for: WBC, HGB, HCT, MCV, PLT, ADJUSTEDWBC, MCH, MCHC, RDW, MPV, NRBC, CMP:   Lab Results   Component Value Date     03/24/2018    K 4 1 03/24/2018     03/24/2018    CO2 25 03/24/2018    ANIONGAP 12 03/24/2018    BUN 29 (H) 03/24/2018    CREATININE 1 04 03/24/2018    GLUCOSE 142 (H) 03/24/2018    CALCIUM 9 1 03/24/2018    EGFR 75 03/24/2018       Imaging: I have personally reviewed pertinent films in PACS  EKG, Pathology, and Other Studies: I have personally reviewed pertinent reports    VTE Pharmacologic Prophylaxis: Heparin

## 2018-03-24 NOTE — PLAN OF CARE
DISCHARGE PLANNING - CARE MANAGEMENT     Discharge to post-acute care or home with appropriate resources Progressing        GASTROINTESTINAL - ADULT     Minimal or absence of nausea and/or vomiting Progressing     Maintains or returns to baseline bowel function Progressing     Maintains adequate nutritional intake Progressing        INFECTION - ADULT     Absence or prevention of progression during hospitalization Progressing     Absence of fever/infection during neutropenic period Progressing        METABOLIC, FLUID AND ELECTROLYTES - ADULT     Electrolytes maintained within normal limits Progressing     Fluid balance maintained Progressing     Glucose maintained within target range Progressing        Nutrition/Hydration-ADULT     Nutrient/Hydration intake appropriate for improving, restoring or maintaining nutritional needs Progressing        PAIN - ADULT     Verbalizes/displays adequate comfort level or baseline comfort level Progressing        Potential for Falls     Patient will remain free of falls Progressing        SAFETY ADULT     Maintain or return to baseline ADL function Progressing     Maintain or return mobility status to optimal level Progressing        SKIN/TISSUE INTEGRITY - ADULT     Skin integrity remains intact Progressing     Incision(s), wounds(s) or drain site(s) healing without S/S of infection Progressing

## 2018-03-24 NOTE — PROGRESS NOTES
NEPHROLOGY PROGRESS NOTE   Patricia Cummins 74 y o  male MRN: [id number]  Unit/Bed#: [place] [address] 222-01 Encounter: [id number]      ASSESSMENT and PLAN:  1  Acute kidney injury (POA):  Creatinine was elevated at 1 5 on admission  Unsure of exact cause but he did get 40 of IV Lasix b i d  yesterday and today creatinine is down to 1 so possibly some component of volume overload  -UA:  Glucose but otherwise bland   -ARB currently on hold but he did get a dose yesterday morning   -bladder scan ordered but not yet done so will follow up on this   -from a renal standpoint would be okay to restart diuretics today so will restart Lasix 80 p o  daily for now   -consider restarting Arb tomorrow if creatinine remains stable  2  CKD stage 2:  Baseline creatinine 1-1 1  Likely due to diabetes and hypertension  3  Chronic diastolic CHF:  On Lasix and spironolactone at home  Restarting Lasix today  4  Diabetes:  Uncontrolled and last A1c was 9 6 in January 5  Hypertension:  Blood pressure acceptable  6  Left foot ulcer:  Per ID note this felt the worsening of the ulcer is due to poorly controlled leg edema and poor hygiene  He is currently not on antibiotics   -restart diuretics for volume control      SUBJECTIVE:  Patient is feeling better today  He thinks his foot is a little better  He has no chest pain or shortness of Breath      OBJECTIVE:  Current Weight: Weight - Scale: (!) 158 kg (349 lb 3 3 oz)  Vitals:    03/24/18 0655   BP: 154/88   Pulse: 68   Resp: 18   Temp: 97 9 °F (36 6 °C)   SpO2: 94%       Intake/Output Summary (Last 24 hours) at 03/24/18 0957  Last data filed at 03/24/18 5973   Gross per 24 hour   Intake                0 ml   Output             3225 ml   Net            -3225 ml     General:  No acute distress  Skin:  No rash  Eyes:  Sclerae anicteric  ENT:  Moist mucous membranes  Neck:  Supple, symmetric  Chest:  Clear to auscultation bilaterally   CVS:  Regular rate and rhythm  Abdomen: Soft, nontender, nondistended  Extremities:  Chronic lower extremity edema with lower extremity wounds  Neuro:  Awake and alert  Psych:  Appropriate affect     Medications:  Scheduled Meds:  Current Facility-Administered Medications:  acetaminophen 650 mg Oral Q6H PRN Celia Cordoba PA-C   albuterol 2 puff Inhalation Q6H PRN Celia Cordoba PA-C   allopurinol 100 mg Oral Daily Helene Corona MD   aluminum-magnesium hydroxide-simethicone 15 mL Oral Q6H PRN Celia Cordoba PA-C   bacitracin 1 small application Topical BID Sheliah People   fluticasone-salmeterol 1 puff Inhalation Q12H [address] Celia Cordoba PA-C   heparin (porcine) 5,000 Units Subcutaneous Q8H [address] Celia Cordoba PA-C   insulin detemir 50 Units Subcutaneous Q12H [address] Helene Corona MD   insulin lispro 1-5 Units Subcutaneous HS Celia Cordoba PA-C   insulin lispro 15 Units Subcutaneous TID With Meals Helene Corona MD   insulin lispro 2-12 Units Subcutaneous TID AC Celia Cordoba PA-C   metoprolol tartrate 25 mg Oral BID Celia Cordoba PA-C   ondansetron 4 mg Intravenous Q6H PRN Celia Cordoba PA-C   sodium hypochlorite 1 application Irrigation Daily Olga Howell DPM       PRN Meds:   acetaminophen    albuterol    aluminum-magnesium hydroxide-simethicone    ondansetron    Laboratory Results:  Lab Results   Component Value Date    WBC 6 52 03/23/2018    HGB 13 3 03/23/2018    HCT 40 9 03/23/2018    MCV 88 03/23/2018     03/23/2018     03/23/2018     Lab Results   Component Value Date    GLUCOSE 142 (H) 03/24/2018    CALCIUM 9 1 03/24/2018     03/24/2018    K 4 1 03/24/2018    CO2 25 03/24/2018     03/24/2018    BUN 29 (H) 03/24/2018    CREATININE 1 04 03/24/2018     Lab Results   Component Value Date    CALCIUM 9 1 03/24/2018

## 2018-03-25 LAB
ANION GAP SERPL CALCULATED.3IONS-SCNC: 13 MMOL/L (ref 4–13)
BUN SERPL-MCNC: 36 MG/DL (ref 5–25)
CALCIUM SERPL-MCNC: 9.2 MG/DL (ref 8.3–10.1)
CHLORIDE SERPL-SCNC: 98 MMOL/L (ref 100–108)
CO2 SERPL-SCNC: 26 MMOL/L (ref 21–32)
CREAT SERPL-MCNC: 1.19 MG/DL (ref 0.6–1.3)
EST. AVERAGE GLUCOSE BLD GHB EST-MCNC: 286 MG/DL
GFR SERPL CREATININE-BSD FRML MDRD: 64 ML/MIN/1.73SQ M
GLUCOSE SERPL-MCNC: 163 MG/DL (ref 65–140)
GLUCOSE SERPL-MCNC: 225 MG/DL (ref 65–140)
GLUCOSE SERPL-MCNC: 225 MG/DL (ref 65–140)
GLUCOSE SERPL-MCNC: 228 MG/DL (ref 65–140)
GLUCOSE SERPL-MCNC: 232 MG/DL (ref 65–140)
HBA1C MFR BLD: 11.6 % (ref 4.2–6.3)
POTASSIUM SERPL-SCNC: 4.2 MMOL/L (ref 3.5–5.3)
SODIUM SERPL-SCNC: 137 MMOL/L (ref 136–145)

## 2018-03-25 PROCEDURE — 83036 HEMOGLOBIN GLYCOSYLATED A1C: CPT | Performed by: INTERNAL MEDICINE

## 2018-03-25 PROCEDURE — 99232 SBSQ HOSP IP/OBS MODERATE 35: CPT | Performed by: INTERNAL MEDICINE

## 2018-03-25 PROCEDURE — 99231 SBSQ HOSP IP/OBS SF/LOW 25: CPT | Performed by: INTERNAL MEDICINE

## 2018-03-25 PROCEDURE — 82948 REAGENT STRIP/BLOOD GLUCOSE: CPT

## 2018-03-25 PROCEDURE — 80048 BASIC METABOLIC PNL TOTAL CA: CPT | Performed by: PHYSICIAN ASSISTANT

## 2018-03-25 RX ORDER — ASPIRIN 81 MG/1
81 TABLET, CHEWABLE ORAL DAILY
Status: DISCONTINUED | OUTPATIENT
Start: 2018-03-25 | End: 2018-03-26 | Stop reason: HOSPADM

## 2018-03-25 RX ADMIN — HEPARIN SODIUM 5000 UNITS: 5000 INJECTION, SOLUTION INTRAVENOUS; SUBCUTANEOUS at 21:35

## 2018-03-25 RX ADMIN — FUROSEMIDE 80 MG: 40 TABLET ORAL at 07:48

## 2018-03-25 RX ADMIN — HEPARIN SODIUM 5000 UNITS: 5000 INJECTION, SOLUTION INTRAVENOUS; SUBCUTANEOUS at 14:11

## 2018-03-25 RX ADMIN — METOPROLOL TARTRATE 25 MG: 25 TABLET ORAL at 17:15

## 2018-03-25 RX ADMIN — Medication 1 SMALL APPLICATION: at 17:15

## 2018-03-25 RX ADMIN — HYOSCYAMINE SULFATE 1 APPLICATION: 16 SOLUTION at 14:12

## 2018-03-25 RX ADMIN — INSULIN LISPRO 2 UNITS: 100 INJECTION, SOLUTION INTRAVENOUS; SUBCUTANEOUS at 07:49

## 2018-03-25 RX ADMIN — INSULIN DETEMIR 50 UNITS: 100 INJECTION, SOLUTION SUBCUTANEOUS at 19:03

## 2018-03-25 RX ADMIN — INSULIN LISPRO 2 UNITS: 100 INJECTION, SOLUTION INTRAVENOUS; SUBCUTANEOUS at 21:36

## 2018-03-25 RX ADMIN — ALUMINUM HYDROXIDE, MAGNESIUM HYDROXIDE, AND SIMETHICONE 15 ML: 200; 200; 20 SUSPENSION ORAL at 19:13

## 2018-03-25 RX ADMIN — INSULIN DETEMIR 50 UNITS: 100 INJECTION, SOLUTION SUBCUTANEOUS at 07:48

## 2018-03-25 RX ADMIN — INSULIN LISPRO 15 UNITS: 100 INJECTION, SOLUTION INTRAVENOUS; SUBCUTANEOUS at 07:48

## 2018-03-25 RX ADMIN — INSULIN LISPRO 15 UNITS: 100 INJECTION, SOLUTION INTRAVENOUS; SUBCUTANEOUS at 17:15

## 2018-03-25 RX ADMIN — METOPROLOL TARTRATE 25 MG: 25 TABLET ORAL at 07:48

## 2018-03-25 RX ADMIN — ASPIRIN 81 MG 81 MG: 81 TABLET ORAL at 17:15

## 2018-03-25 RX ADMIN — ALLOPURINOL 100 MG: 100 TABLET ORAL at 07:48

## 2018-03-25 RX ADMIN — INSULIN LISPRO 4 UNITS: 100 INJECTION, SOLUTION INTRAVENOUS; SUBCUTANEOUS at 11:51

## 2018-03-25 RX ADMIN — Medication 1 SMALL APPLICATION: at 14:13

## 2018-03-25 RX ADMIN — FLUTICASONE PROPIONATE AND SALMETEROL 1 PUFF: 50; 250 POWDER RESPIRATORY (INHALATION) at 07:48

## 2018-03-25 RX ADMIN — INSULIN LISPRO 15 UNITS: 100 INJECTION, SOLUTION INTRAVENOUS; SUBCUTANEOUS at 11:50

## 2018-03-25 RX ADMIN — INSULIN LISPRO 4 UNITS: 100 INJECTION, SOLUTION INTRAVENOUS; SUBCUTANEOUS at 17:15

## 2018-03-25 RX ADMIN — HEPARIN SODIUM 5000 UNITS: 5000 INJECTION, SOLUTION INTRAVENOUS; SUBCUTANEOUS at 05:01

## 2018-03-25 NOTE — PROGRESS NOTES
[facility] Progress Note  Patient: Dev Howard 74 y o  male   MRN: [id number]  PCP: Shelby Cruz MD  Unit/Bed#: [facility] 2 [facility] [address] 222-01 Encounter: [id number]  Date Of Visit: 18    Assessment and plan:    Principal Problem:    Diabetic foot ulcer (HCC)-without secondary infection  Observe off antibiotics  Continue local wound care per Podiatry  Emphasized self hygiene  Active Problems:    Diabetes mellitus type 2, uncontrolled (HCC)-A1c is uncontrolled secondary to noncompliance and poor self-care  Continue Levemir 50 units b i d   Continue Humalog 15 units with meals    Chronic atrial fibrillation (HCC)-last cardiology visit reviewed  The patient is not on anticoagulation prior to admission  He has refused this in the past   He also reported having a peptic ulcer with bleed  He also has proven to be noncompliant  Continue rate control with metoprolol  Add baby aspirin    Chronic diastolic congestive heart failure (HCC)-volume status stable  Continue oral Lasix    Gout-continue allopurinol    Hypertension-blood pressure at goal on low-dose metoprolol 25 mg b i d     Morbid obesity ([address] )          VTE Pharmacologic Prophylaxis:   Pharmacologic: Heparin  Mechanical VTE Prophylaxis in Place: No    Time Spent for Care: 20 minutes  More than 50% of total time spent on counseling and coordination of care as described above  Current Length of Stay: 2 day(s)    Current Patient Status: Inpatient   Certification Statement: The patient will continue to require additional inpatient hospital stay due to Placement    Discharge Plan:  Short-term rehab  The patient prefers [address] [place] [facility] [place] [facility] or [place]    Code Status: Level 1 - Full Code      Subjective:   No events overnight  Denies pain   He is agreeable to go to rehab    Objective:     Vitals:   Temp (24hrs), Av 3 °F (36 8 °C), Min:98 2 °F (36 8 °C), Max:98 4 °F (36 9 °C)    HR:  [66-83] 77  Resp:  [18-20] 18  BP: (123-138)/(60-69) 138/60  SpO2:  [93 %-95 %] 95 %  Body mass index is 43 26 kg/m²  Input and Output Summary (last 24 hours): Intake/Output Summary (Last 24 hours) at 03/25/18 1456  Last data filed at 03/25/18 0501   Gross per 24 hour   Intake              360 ml   Output              900 ml   Net             -540 ml       Physical Exam:     Physical Exam   Constitutional: He is oriented to person, place, and time  He appears well-nourished  obese   HENT:   Head: Normocephalic and atraumatic  Eyes: No scleral icterus  Cardiovascular:   Irregular   Pulmonary/Chest: Effort normal  No stridor  No respiratory distress  He has no wheezes  He has no rales  Abdominal: Soft  Bowel sounds are normal  He exhibits no distension  There is no tenderness  Musculoskeletal: He exhibits edema  Neurological: He is alert and oriented to person, place, and time  Skin:   Bilateral venous stasis dermatitis L > R   Psychiatric: He has a normal mood and affect  His behavior is normal        Additional Data:     Labs:      Results from last 7 days  Lab Units 03/23/18  0618 03/23/18  0236   WBC Thousand/uL 6 52 5 99   HEMOGLOBIN g/dL 13 3 14 2   HEMATOCRIT % 40 9 43 5   PLATELETS Thousands/uL 185  185 183   NEUTROS PCT %  --  65   LYMPHS PCT %  --  24   MONOS PCT %  --  9   EOS PCT %  --  2       Results from last 7 days  Lab Units 03/25/18  0831  03/23/18  0236   SODIUM mmol/L 137  < > 136   POTASSIUM mmol/L 4 2  < > 4 8   CHLORIDE mmol/L 98*  < > 99*   CO2 mmol/L 26  < > 27   BUN mg/dL 36*  < > 33*   CREATININE mg/dL 1 19  < > 1 53*   CALCIUM mg/dL 9 2  < > 9 5   TOTAL PROTEIN g/dL  --   --  8 1   BILIRUBIN TOTAL mg/dL  --   --  0 38   ALK PHOS U/L  --   --  194*   ALT U/L  --   --  39   AST U/L  --   --  26   GLUCOSE RANDOM mg/dL 225*  < > 422*   < > = values in this interval not displayed  Results from last 7 days  Lab Units 03/23/18  0236   INR  1 00       * I Have Reviewed All Lab Data Listed Above    * Additional Pertinent Lab Tests Reviewed: All Labs Within Last 24 Hours Reviewed    Imaging:    Imaging Reports Reviewed Today Include: no new imaging      Recent Cultures (last 7 days):       Results from last 7 days  Lab Units 03/23/18  0236   BLOOD CULTURE  No Growth at 48 hrs  No Growth at 48 hrs  Last 24 Hours Medication List:     Current Facility-Administered Medications:  acetaminophen 650 mg Oral Q6H PRN LESLEY Gonzales-SHANEKA   albuterol 2 puff Inhalation Q6H PRN Ursula Cameron PA-C   allopurinol 100 mg Oral Daily Anna Alegre MD   aluminum-magnesium hydroxide-simethicone 15 mL Oral Q6H PRN Ursula Cameron PA-C   bacitracin 1 small application Topical BID Maki Hobby   fluticasone-salmeterol 1 puff Inhalation Q12H [facility] Ursula Cameron PA-C   furosemide 80 mg Oral Daily KendraLESLEY Vargas-SHANEKA   heparin (porcine) 5,000 Units Subcutaneous Q8H [facility] Ursula Cameron PA-C   insulin detemir 50 Units Subcutaneous Q12H [facility] Anna Alegre MD   insulin lispro 1-5 Units Subcutaneous HS Ursula Cameron PA-C   insulin lispro 15 Units Subcutaneous TID With Meals Anna Alegre MD   insulin lispro 2-12 Units Subcutaneous TID AC Ursula Cameron PA-C   metoprolol tartrate 25 mg Oral BID Ursula Cameron PA-C   ondansetron 4 mg Intravenous Q6H PRN Ursula Cameron PA-C   sodium hypochlorite 1 application Irrigation Daily Tenzin Wallace DPM        Today, Patient Was Seen By: Anna Alegre MD    ** Please Note: Dragon 360 Dictation voice to text software may have been used in the creation of this document   **

## 2018-03-25 NOTE — PROGRESS NOTES
NEPHROLOGY PROGRESS NOTE   Armin Cummins 74 y o  male MRN: [id number]  [facility] [address] [facility] Encounter: [id number]      ASSESSMENT and PLAN:  1  Acute kidney injury (POA):  Creatinine was elevated at 1 5 on admission  Unsure of exact cause but he did get 40 of IV Lasix b i d  yesterday and today creatinine is down to 1 so possibly some component of volume overload  -UA:  Glucose but otherwise bland   -ARB currently on hold   -Lasix 80 mg daily restarted yesterday and creatinine today increased slightly from 1 04 to 1 19   -will continue to hold Arb today since creatinine increased slightly with restarting diuretic yesterday and blood pressure is very good today   -check a m  BMP and is stable could restart ARB  2  CKD stage 2:  Baseline creatinine 1-1 1  Likely due to diabetes and hypertension  3  Chronic diastolic CHF:  On Lasix and spironolactone at home  Lasix restarted yesterday  4  Diabetes:  Uncontrolled and last A1c was 9 6 in January 5  Hypertension:  Blood pressure acceptable  6  Left foot ulcer:  Per ID note this felt the worsening of the ulcer is due to poorly controlled leg edema and poor hygiene  He is currently not on antibiotics   -restarted diuretics for volume control      SUBJECTIVE:  Frustrated that his heels started bleeding again  He has no chest pain or shortness of breath  He thinks his edema is at baseline      OBJECTIVE:  Current Weight: Weight - Scale: (!) 157 kg (346 lb 2 oz)  Vitals:    03/25/18 0714   BP: 138/60   Pulse: 77   Resp: 18   Temp: 98 4 °F (36 9 °C)   SpO2: 95%       Intake/Output Summary (Last 24 hours) at 03/25/18 0912  Last data filed at 03/25/18 0501   Gross per 24 hour   Intake              360 ml   Output              900 ml   Net             -540 ml     General:  No acute distress  Skin:  No rash  Eyes:  Sclerae anicteric  ENT:  Moist mucous membranes  Neck:  Supple, symmetric  Chest:  Clear to auscultation bilaterally   CVS:  Regular rate and rhythm  Abdomen:  Soft, nontender, nondistended  Extremities:  Chronic edema  Neuro:  Awake and alert  Psych:  Appropriate affect    Medications:  Scheduled Meds:    Current Facility-Administered Medications:  acetaminophen 650 mg Oral Q6H PRN Ravinder Bernard PA-C   albuterol 2 puff Inhalation Q6H PRN Ravinder Bernard PA-C   allopurinol 100 mg Oral Daily Eugenio Rivera MD   aluminum-magnesium hydroxide-simethicone 15 mL Oral Q6H PRN Ravinder Bernard PA-C   bacitracin 1 small application Topical BID Mayela Duff   fluticasone-salmeterol 1 puff Inhalation Q12H [address] Ravinder Bernard PA-C   furosemide 80 mg Oral Daily Pastzoe Power PA-C   heparin (porcine) 5,000 Units Subcutaneous Q8H [address] Ravinder Bernard PA-C   insulin detemir 50 Units Subcutaneous Q12H [address] Eugenio Rivera MD   insulin lispro 1-5 Units Subcutaneous HS Ravinder Bernard PA-C   insulin lispro 15 Units Subcutaneous TID With Meals Eugenio Rivera MD   insulin lispro 2-12 Units Subcutaneous TID AC Ravinder Bernard PA-C   metoprolol tartrate 25 mg Oral BID Ravinder Bernard PA-C   ondansetron 4 mg Intravenous Q6H PRN Ravinder Bernard PA-C   sodium hypochlorite 1 application Irrigation Daily Olga Howell DPM       PRN Meds:   acetaminophen    albuterol    aluminum-magnesium hydroxide-simethicone    ondansetron    Laboratory Results:  Lab Results   Component Value Date    WBC 6 52 03/23/2018    HGB 13 3 03/23/2018    HCT 40 9 03/23/2018    MCV 88 03/23/2018     03/23/2018     03/23/2018     Lab Results   Component Value Date    GLUCOSE 225 (H) 03/25/2018    CALCIUM 9 2 03/25/2018     03/25/2018    K 4 2 03/25/2018    CO2 26 03/25/2018    CL 98 (L) 03/25/2018    BUN 36 (H) 03/25/2018    CREATININE 1 19 03/25/2018     Lab Results   Component Value Date    CALCIUM 9 2 03/25/2018

## 2018-03-25 NOTE — PLAN OF CARE
DISCHARGE PLANNING - CARE MANAGEMENT     Discharge to post-acute care or home with appropriate resources Progressing        GASTROINTESTINAL - ADULT     Minimal or absence of nausea and/or vomiting Progressing     Maintains or returns to baseline bowel function Progressing     Maintains adequate nutritional intake Progressing        INFECTION - ADULT     Absence or prevention of progression during hospitalization Progressing     Absence of fever/infection during neutropenic period Progressing        METABOLIC, FLUID AND ELECTROLYTES - ADULT     Electrolytes maintained within normal limits Progressing     Fluid balance maintained Progressing     Glucose maintained within target range Progressing        Nutrition/Hydration-ADULT     Nutrient/Hydration intake appropriate for improving, restoring or maintaining nutritional needs Progressing        PAIN - ADULT     Verbalizes/displays adequate comfort level or baseline comfort level Progressing        Potential for Falls     Patient will remain free of falls Progressing        Prexisting or High Potential for Compromised Skin Integrity     Skin integrity is maintained or improved Progressing        SAFETY ADULT     Maintain or return to baseline ADL function Progressing     Maintain or return mobility status to optimal level Progressing        SKIN/TISSUE INTEGRITY - ADULT     Skin integrity remains intact Progressing     Incision(s), wounds(s) or drain site(s) healing without S/S of infection Progressing

## 2018-03-25 NOTE — PROGRESS NOTES
Progress Note - Infectious Disease   Shaheed Cummins 74 y o  male MRN: [id number]  Unit/Bed#: [facility] 2 -01 Encounter: [id number]      Impression/Recommendations:  1   Draining left heel ulcer   On examination, this appears to be chronic, with no evidence of cellulitis   Patient has no fever or leukocytosis   I suspect the development and worsening of ulcer is due to a combination of poorly controlled leg edema and extremely poor hygiene  No antibiotic is necessary for this   All patient is local wound care   More specifically, when he needs to most is basic hygiene (eg   shower or bathe on a regular basis)  Observe off antibiotic  Local wound care  Discussed with patient in detail regarding the need for basic hygiene      2   Venous stasis with poorly controlled leg edema   This is most likely secondary to sedentary lifestyle, exacerbated by morbid obesity   Patient need to lose weight   He has exercised  [place] to keep his legs elevated   He may benefit from compression stockings short-term  Keep legs elevated to control edema      3   CKD      4   Poorly compliant DM, with hyperglycemia on admission      5   Poor general hygiene      Discussed with patient in detail regarding all above      Antibiotics:  Off antibiotic     Subjective:  Patient is comfortable  No left heel pain  Temperature stays down  No chills  Objective:  Vitals:  HR:  [66-83] 77  Resp:  [18-20] 18  BP: (123-138)/(60-69) 138/60  SpO2:  [93 %-95 %] 95 %  Temp (24hrs), Av 3 °F (36 8 °C), Min:98 2 °F (36 8 °C), Max:98 4 °F (36 9 °C)  Current: Temperature: 98 4 °F (36 9 °C)    Physical Exam:     General: Awake, alert, cooperative, no distress  Lungs: Expansion symmetric, no rales, no wheezing, respirations unlabored  Heart[de-identified]  Regular rate and rhythm, S1 and S2 normal, no murmur  Abdomen: Soft, nondistended, non-tender, bowel sounds active all four quadrants,        no masses, no organomegaly     Extremities: Stable edema   Stable venous stasis changes  Ankle/heel with dressing in place  Dressing is dry  No tenderness  Skin:  No rash  Invasive Devices     Peripheral Intravenous Line            Peripheral IV 03/23/18 Right Antecubital 2 days                Labs studies:   I have personally reviewed pertinent labs  Results from last 7 days  Lab Units 03/25/18  0831 03/24/18  0516 03/23/18  0236   SODIUM mmol/L 137 138 136   POTASSIUM mmol/L 4 2 4 1 4 8   CHLORIDE mmol/L 98* 101 99*   CO2 mmol/L 26 25 27   ANION GAP mmol/L 13 12 10   BUN mg/dL 36* 29* 33*   CREATININE mg/dL 1 19 1 04 1 53*   EGFR ml/min/1 73sq m 64 75 47   GLUCOSE RANDOM mg/dL 225* 142* 422*   CALCIUM mg/dL 9 2 9 1 9 5   AST U/L  --   --  26   ALT U/L  --   --  39   ALK PHOS U/L  --   --  194*   TOTAL PROTEIN g/dL  --   --  8 1   BILIRUBIN TOTAL mg/dL  --   --  0 38       Results from last 7 days  Lab Units 03/23/18  0618 03/23/18  0236   WBC Thousand/uL 6 52 5 99   HEMOGLOBIN g/dL 13 3 14 2   PLATELETS Thousands/uL 185  185 183       Results from last 7 days  Lab Units 03/23/18  0236   BLOOD CULTURE  No Growth at 48 hrs  No Growth at 48 hrs  Imaging Studies:   I have personally reviewed pertinent imaging study reports and images in PACS  EKG, Pathology, and Other Studies:   I have personally reviewed pertinent reports

## 2018-03-26 VITALS
OXYGEN SATURATION: 93 % | SYSTOLIC BLOOD PRESSURE: 140 MMHG | HEART RATE: 68 BPM | TEMPERATURE: 96.9 F | DIASTOLIC BLOOD PRESSURE: 75 MMHG | BODY MASS INDEX: 39.17 KG/M2 | HEIGHT: 75 IN | RESPIRATION RATE: 18 BRPM | WEIGHT: 315 LBS

## 2018-03-26 LAB
ANION GAP SERPL CALCULATED.3IONS-SCNC: 10 MMOL/L (ref 4–13)
BUN SERPL-MCNC: 36 MG/DL (ref 5–25)
CALCIUM SERPL-MCNC: 8.9 MG/DL (ref 8.3–10.1)
CHLORIDE SERPL-SCNC: 100 MMOL/L (ref 100–108)
CO2 SERPL-SCNC: 26 MMOL/L (ref 21–32)
CREAT SERPL-MCNC: 1.13 MG/DL (ref 0.6–1.3)
GFR SERPL CREATININE-BSD FRML MDRD: 68 ML/MIN/1.73SQ M
GLUCOSE SERPL-MCNC: 126 MG/DL (ref 65–140)
GLUCOSE SERPL-MCNC: 236 MG/DL (ref 65–140)
GLUCOSE SERPL-MCNC: 248 MG/DL (ref 65–140)
GLUCOSE SERPL-MCNC: 309 MG/DL (ref 65–140)
POTASSIUM SERPL-SCNC: 4.4 MMOL/L (ref 3.5–5.3)
SODIUM SERPL-SCNC: 136 MMOL/L (ref 136–145)

## 2018-03-26 PROCEDURE — 82948 REAGENT STRIP/BLOOD GLUCOSE: CPT

## 2018-03-26 PROCEDURE — 97535 SELF CARE MNGMENT TRAINING: CPT

## 2018-03-26 PROCEDURE — 97530 THERAPEUTIC ACTIVITIES: CPT

## 2018-03-26 PROCEDURE — 80048 BASIC METABOLIC PNL TOTAL CA: CPT | Performed by: PHYSICIAN ASSISTANT

## 2018-03-26 PROCEDURE — 99232 SBSQ HOSP IP/OBS MODERATE 35: CPT | Performed by: INTERNAL MEDICINE

## 2018-03-26 PROCEDURE — 99231 SBSQ HOSP IP/OBS SF/LOW 25: CPT | Performed by: INTERNAL MEDICINE

## 2018-03-26 PROCEDURE — 99239 HOSP IP/OBS DSCHRG MGMT >30: CPT | Performed by: INTERNAL MEDICINE

## 2018-03-26 PROCEDURE — 97110 THERAPEUTIC EXERCISES: CPT

## 2018-03-26 RX ORDER — ASPIRIN 81 MG/1
81 TABLET, CHEWABLE ORAL DAILY
Refills: 0
Start: 2018-03-26 | End: 2018-12-29 | Stop reason: HOSPADM

## 2018-03-26 RX ADMIN — ASPIRIN 81 MG 81 MG: 81 TABLET ORAL at 07:53

## 2018-03-26 RX ADMIN — FUROSEMIDE 80 MG: 40 TABLET ORAL at 07:53

## 2018-03-26 RX ADMIN — HYOSCYAMINE SULFATE 1 APPLICATION: 16 SOLUTION at 07:57

## 2018-03-26 RX ADMIN — INSULIN LISPRO 15 UNITS: 100 INJECTION, SOLUTION INTRAVENOUS; SUBCUTANEOUS at 07:51

## 2018-03-26 RX ADMIN — INSULIN DETEMIR 50 UNITS: 100 INJECTION, SOLUTION SUBCUTANEOUS at 07:51

## 2018-03-26 RX ADMIN — Medication 1 SMALL APPLICATION: at 07:52

## 2018-03-26 RX ADMIN — Medication 1 SMALL APPLICATION: at 17:01

## 2018-03-26 RX ADMIN — INSULIN LISPRO 15 UNITS: 100 INJECTION, SOLUTION INTRAVENOUS; SUBCUTANEOUS at 17:00

## 2018-03-26 RX ADMIN — METOPROLOL TARTRATE 25 MG: 25 TABLET ORAL at 07:52

## 2018-03-26 RX ADMIN — HEPARIN SODIUM 5000 UNITS: 5000 INJECTION, SOLUTION INTRAVENOUS; SUBCUTANEOUS at 05:00

## 2018-03-26 RX ADMIN — INSULIN LISPRO 2 UNITS: 100 INJECTION, SOLUTION INTRAVENOUS; SUBCUTANEOUS at 07:53

## 2018-03-26 RX ADMIN — INSULIN LISPRO 4 UNITS: 100 INJECTION, SOLUTION INTRAVENOUS; SUBCUTANEOUS at 11:49

## 2018-03-26 RX ADMIN — ALLOPURINOL 100 MG: 100 TABLET ORAL at 07:51

## 2018-03-26 RX ADMIN — HEPARIN SODIUM 5000 UNITS: 5000 INJECTION, SOLUTION INTRAVENOUS; SUBCUTANEOUS at 13:05

## 2018-03-26 RX ADMIN — METOPROLOL TARTRATE 25 MG: 25 TABLET ORAL at 17:00

## 2018-03-26 RX ADMIN — INSULIN LISPRO 15 UNITS: 100 INJECTION, SOLUTION INTRAVENOUS; SUBCUTANEOUS at 11:48

## 2018-03-26 RX ADMIN — FLUTICASONE PROPIONATE AND SALMETEROL 1 PUFF: 50; 250 POWDER RESPIRATORY (INHALATION) at 07:53

## 2018-03-26 NOTE — DISCHARGE SUMMARY
Discharge Summary - [facility] 73 Internal Medicine    Patient Information: Danica Velazquez 74 y o  male MRN: [id number]  Unit/Bed#: [place] 68 2 [facility] 87 222-01 Encounter: [id number]    Discharging Physician / Practitioner: Helene Corona MD  PCP: Chelle Corea MD  Admission Date: 3/23/2018  Discharge Date: 03/26/18    Reason for Admission: foot ulcer    Discharge Diagnoses:     Principal Problem:    Diabetic foot ulcer ([place] [address] )  Active Problems:    Diabetes mellitus type 2, uncontrolled ([place] [address] )    Chronic atrial fibrillation ([place] [address] )    Chronic diastolic congestive heart failure ([place] [address] )    Gout    Hypertension    GERD (gastroesophageal reflux disease)    Hyperlipidemia    Morbid obesity ([place] [address] )  Resolved Problems:    * No resolved hospital problems  *      Consultations During Hospital Stay:  · ID Dr Pushpa Pearl  · Podiatry Dr Fernando Miranda  · Nephrology    Procedures Performed:     · Local wound care    Significant Findings:     · Diabetic ulcer without secondary infection  · A1c 11 6%    Incidental Findings:   · none     Test Results Pending at Discharge (will require follow up):   · none     Outpatient Tests Requested:  · none    Complications:  none    Hospital Course:     Danica Velazquez is a 74 y o  male patient who originally presented to the hospital on 3/23/2018 due to worsening left foot ulcer and foul-smelling discharge  He has multiple medical problems requiring a number of medication  He has been noncompliant with medication and has had poor self-care  Please see the list below for his hospital course per problem  · Left diabetic foot ulcer-the patient was seen by infectious disease and podiatry  There was no secondary infection identified  And he was observed off antibiotics  Local wound care and daily dressing was applied by Podiatry  He will need further wound care follow-up at the [facility]  in [facility]  The patient will be transferred to [facility] in [facility] for short-term rehab    Local wound care can be done there  · Diabetes type 2, uncontrolled with diabetic foot ulcer-A1c was noted to be 11 6%  He was restarted on Levemir 50 units b i d  He was started on Humalog 15 units with meals, however his sugars were still elevated at this dose so his Humalog will be increased to 20 units with meals on discharge  · Acute kidney injury-mild on admission, quickly resolved with IV fluid  Patient was evaluated by Nephrology  His Lasix was restarted and losartan will also be restarted on discharge  · Atrial fibrillation-this is chronic  He has been followed by Dr Juan Ramon Miller  He has been advised anticoagulation in the past which she refused  We discussed about the risk of stroke with atrial fibrillation and he was still undecided  He will be discharged on aspirin 81 mg daily for primary stroke prevention until he decides on anticoagulation  He was last seen by Cardiology in February this year and will see Cardiology routinely after 5 months  · None compliance and poor self-care-patient was strongly advised to take better care of himself and be compliant with his medication  Condition at Discharge: good     Discharge Day Visit / Exam:     Subjective:  No events overnight  No fever or chills  Vitals: Blood Pressure: 140/75 (03/26/18 1513)  Pulse: 68 (03/26/18 1513)  Temperature: (!) 96 9 °F (36 1 °C) (03/26/18 1513)  Temp Source: Tympanic (03/26/18 1513)  Respirations: 18 (03/26/18 1513)  Height: 6' 3" (190 5 cm) (03/23/18 1141)  Weight - Scale: (!) 158 kg (348 lb 5 2 oz) (03/26/18 0600)  SpO2: 93 % (03/26/18 1513)  Exam:   Physical Exam   Constitutional: He appears well-developed and well-nourished  HENT:   Head: Normocephalic and atraumatic  Eyes: No scleral icterus  Cardiovascular:   No murmur heard  Irregular   Pulmonary/Chest: Effort normal and breath sounds normal  No stridor  No respiratory distress  He has no wheezes  Abdominal: Soft  Bowel sounds are normal    Musculoskeletal: He exhibits edema  Neurological: He is alert  Skin: He is not diaphoretic  Venous stasis dermatitis   Psychiatric: He has a normal mood and affect  Discharge instructions/Information to patient and family:   See after visit summary for information provided to patient and family  Provisions for Follow-Up Care:  See after visit summary for information related to follow-up care and any pertinent home health orders  Disposition:     Ernst Souza at [address] [place] [address] Readmission: none     Discharge Statement:  I spent >30 minutes discharging the patient  This time was spent on the day of discharge  I had direct contact with the patient on the day of discharge  Greater than 50% of the total time was spent examining patient, answering all patient questions, arranging and discussing plan of care with patient as well as directly providing post-discharge instructions  Additional time then spent on discharge activities  Spoke with significant other at bedside  Coordinated with case management    Discharge Medications:  See after visit summary for reconciled discharge medications provided to patient and family  ** Please Note: Dragon 360 Dictation voice to text software may have been used in the creation of this document   **

## 2018-03-26 NOTE — PLAN OF CARE
Problem: PHYSICAL THERAPY ADULT  Goal: Performs mobility at highest level of function for planned discharge setting  See evaluation for individualized goals  Treatment/Interventions: Functional transfer training, Therapeutic exercise, Endurance training, Patient/family training, Bed mobility, Spoke to nursing, OT  Equipment Recommended: Wheelchair, Walker       See flowsheet documentation for full assessment, interventions and recommendations  Outcome: Progressing  Prognosis: Good  Problem List: Decreased strength, Decreased endurance, Impaired balance, Decreased mobility, Decreased safety awareness, Obesity, Decreased skin integrity, Orthopedic restrictions  Assessment: Pt demonstrates difficulty this session in maintaining NWB to the R le with sit to stand transfers  PT able to maintain NWB with static standing with verbal cues for ue weightbearing techniques improved posture and reminders to keep R foot off floor  PT  Performed 1' x2 with mod assist x2 and moderate verbal cues for technique  TP  Is easily fatigued  PT peformed stand pivot tranfers from bed to chair with mod assist x2  Pt  Performs seated b/l le arom exercises with verbal cues and demonstration for correct exercise techniques  Pt issued written le supine HEP  Recommend STR at d/c inorder to progress mobility and maximize safety and functional independence  Barriers to Discharge: Decreased caregiver support, Inaccessible home environment  Barriers to Discharge Comments: equipment does not fit in house, pt is now NWB LLE, decreased support at home often home alone  Recommendation: Short-term skilled PT     PT - OK to Discharge: Yes (to rehab)    See flowsheet documentation for full assessment

## 2018-03-26 NOTE — PHYSICAL THERAPY NOTE
PHYSICAL THERAPY NOTE          Patient Name: Kam NAVA Date: 3/26/2018  [id number]         Pt seen for application of Globopedi heel unloading shoe to ENRIQUE keita  PT and girlfriend instructed in use and purpose of gobopedi shoe  PT instructed in donning of globopedi shoe and use and wear time  PT instructed to perform skin checks for reddened areas or pressure areas and areas of skin break down  Pt instructed to take shoe off if any areas of poor skin integrity are seen or occur  Pt intructed Not to wear shoe while in bed, however pt insisted on wearing Globopedi shoe while in bed as pt was afraid he would NOT be able to don shoe on his own if it was taken off  Spoke to Pt 's nurse Evelina Perez and instructed her to encourage pt to perform skin checks and take shoe off while in bed  Time spent obtaining proper shoe size, and obtaining proper paper work  Reinforced weightbearing status to pt that even though he has this shoe he is still remain NWB to ENRIQUE Ortiz, PTA

## 2018-03-26 NOTE — PROGRESS NOTES
Progress Note - Infectious Disease   Elinda Gregorio Cummins 74 y o  male MRN: [id number]  Unit/Bed#: [place] 2 - Encounter: [id number]      Impression/Recommendations:  1   Draining left heel ulcer   On examination, this appears to be chronic, with no evidence of cellulitis   Patient has no fever or leukocytosis   I suspect the development and worsening of ulcer is due to a combination of poorly controlled leg edema and extremely poor hygiene  No antibiotic is necessary for this   All patient is local wound care   More specifically, when he needs to most is basic hygiene (eg   shower or bathe on a regular basis)  Observe off antibiotic  Local wound care  Discussed with patient in detail regarding the need for basic hygiene      2   Venous stasis with poorly controlled leg edema   This is most likely secondary to sedentary lifestyle, exacerbated by morbid obesity   Patient need to lose weight   He has exercised  Jenita Scarlet to keep his legs elevated   He may benefit from compression stockings short-term  Keep legs elevated to control edema      3   CKD      4   Poorly compliant DM, with hyperglycemia on admission      5   Poor general hygiene      Discussed with patient in detail regarding all above  With no active infection, I will sign off  Thank you for consultation  Please not hesitate to reconsult us for any questions or issues      Antibiotics:  Off antibiotic     Subjective:  Patient is comfortable  No left heel pain at rest   Pain mild with weight-bearing/ambulation  Temperature stays down   No chills  Objective:  Vitals:  HR:  [65-80] 80  Resp:  [18] 18  BP: (132-159)/(58-81) 137/66  SpO2:  [93 %-98 %] 93 %  Temp (24hrs), Av °F (36 7 °C), Min:97 6 °F (36 4 °C), Max:98 7 °F (37 1 °C)  Current: Temperature: 97 8 °F (36 6 °C)    Physical Exam:     General: Awake, alert, cooperative, no distress  Lungs: Expansion symmetric, no rales, no wheezing, respirations unlabored     Heart[de-identified]  Regular rate and rhythm, S1 and S2 normal, no murmur  Abdomen: Soft, nondistended, non-tender, bowel sounds active all four quadrants,        no masses, no organomegaly  Extremities: Stable leg edema  Stable venous stasis changes  Heel with dressing in place  Dressing is dry  Minimal tenderness  Skin:  No rash  Invasive Devices     Peripheral Intravenous Line            Peripheral IV 03/23/18 Right Antecubital 3 days                Labs studies:   I have personally reviewed pertinent labs  Results from last 7 days  Lab Units 03/26/18  0533 03/25/18  0831 03/24/18  0516 03/23/18  0236   SODIUM mmol/L 136 137 138 136   POTASSIUM mmol/L 4 4 4 2 4 1 4 8   CHLORIDE mmol/L 100 98* 101 99*   CO2 mmol/L 26 26 25 27   ANION GAP mmol/L 10 13 12 10   BUN mg/dL 36* 36* 29* 33*   CREATININE mg/dL 1 13 1 19 1 04 1 53*   EGFR ml/min/1 73sq m 68 64 75 47   GLUCOSE RANDOM mg/dL 309* 225* 142* 422*   CALCIUM mg/dL 8 9 9 2 9 1 9 5   AST U/L  --   --   --  26   ALT U/L  --   --   --  39   ALK PHOS U/L  --   --   --  194*   TOTAL PROTEIN g/dL  --   --   --  8 1   BILIRUBIN TOTAL mg/dL  --   --   --  0 38       Results from last 7 days  Lab Units 03/23/18  0618 03/23/18  0236   WBC Thousand/uL 6 52 5 99   HEMOGLOBIN g/dL 13 3 14 2   PLATELETS Thousands/uL 185  185 183       Results from last 7 days  Lab Units 03/23/18  0236   BLOOD CULTURE  No Growth at 72 hrs  No Growth at 72 hrs  Imaging Studies:   I have personally reviewed pertinent imaging study reports and images in PACS  EKG, Pathology, and Other Studies:   I have personally reviewed pertinent reports

## 2018-03-26 NOTE — PROGRESS NOTES
Progress Note - Podiatry  Pierre Jaz Cummins 74 y o  male MRN: [id number]  Unit/Bed#: [place] 2 -01 Encounter: [id number]    Assessment/Plan:  1  Ulceration of the left plantar heel 2/2 DM with neuropathy, richard stage 2  2  Venous stasis  3  Cellulitis  4  DM type II  5  Medical noncompliance  6  Excoriations of the anterior legs     Plan:  -dressing changed today with attending during rounds: maxsorb Ag, DSD   -nursing to continue with cleansing bilateral legs with Hibiclens daily while in-house   -lower extremity erythema is still persistent with underlying chronic venous stasis dermatitis, however clinically improving   -xrays: no OM   -encourage patient to continue elevating his bilateral lower extremities to reduce edema, and wear global ped issue on the left foot to offload heel: shoe was ordered on Friday- spoke with nurse who is aware patient will need this prior to discharge   -observe off antibiotics per Infectious Disease  -WBS: NWB LLE   -patient is stable from podiatric standpoint, nursing as facility is to change dressings daily with maxsorb Ag, DSD  -patient to follow-up with Dr Shital Ji at the [place] [id number]     Subjective/Objective   Chief Complaint:   Chief Complaint   Patient presents with    Foot Ulcer     pt c/o left foot heel wound  pt is a diabetic and has not been seen at the wound center for the past 2 months  pt called Dr reid and was told" if the heel is oozing and painful he should come to the ED" Pt denies any other issues  Subjective: 74 y o  y/o male was seen and evaluated at bedside  Denies constitutional symptoms  Blood pressure 137/66, pulse 80, temperature 97 8 °F (36 6 °C), temperature source Tympanic, resp  rate 18, height 6' 3" (1 905 m), weight (!) 158 kg (348 lb 5 2 oz), SpO2 93 %  ,Body mass index is 43 54 kg/m²      Invasive Devices     Peripheral Intravenous Line            Peripheral IV 03/23/18 Right Antecubital 3 days Physical Exam:   General: Alert, cooperative and no distress  Lungs: Non labored breathing  Abdomen: Soft, non-tender  Extremity: NVS and motor functions baseline, LE erythema persistent with chronic venous stasis dermatitis however improving clinically, left wound has granular base with no acute signs of infection, wound base measures approximately 6 5 x 5 4 x 0 2cm       Lab, Imaging and other studies:   CBC: No results found for: WBC, HGB, HCT, MCV, PLT, ADJUSTEDWBC, MCH, MCHC, RDW, MPV, NRBC    Imaging: I have personally reviewed pertinent films in PACS  EKG, Pathology, and Other Studies: I have personally reviewed pertinent reports    VTE Pharmacologic Prophylaxis: Heparin, ASA

## 2018-03-26 NOTE — PLAN OF CARE
Problem: OCCUPATIONAL THERAPY ADULT  Goal: Performs self-care activities at highest level of function for planned discharge setting  See evaluation for individualized goals  Treatment Interventions: ADL retraining, Functional transfer training, UE strengthening/ROM, Endurance training, Patient/family training, Compensatory technique education          See flowsheet documentation for full assessment, interventions and recommendations  Outcome: Progressing  Limitation: Decreased ADL status, Decreased UE strength, Decreased Safe judgement during ADL, Decreased endurance  Prognosis: Fair  Assessment: Pt was seen for skilled OT with focus on self care tasks, functional transfers and review of RW safety, NWB status  Pt required moderate redirection and reassurance  Pt reports having his water and electric shut off  Forwarded all information to case management  Pt required Min A overall for sit to stand at [address] [place] [address]  Mod A for attempted mobility  Limited carry over of reviewed information  Hands on A required due to impulsive movements noted  Pt with impulsive movements  See above levels of A required for all functional tasks  Pt will benefit from further rehab with focus on achieving optimal performance levels with all functional tasks        OT Discharge Recommendation: Short Term Rehab         Comments: Jani Owen, [address]

## 2018-03-26 NOTE — DISCHARGE INSTRUCTIONS
Podiatry: Nursing to do dressing changes to left heel daily with maxsorb Ag, NNEKA  Globo-pedi shoe to LLE  Call to schedule follow-up with Dr Ana María Maldonado at the [facility]

## 2018-03-26 NOTE — PROGRESS NOTES
NEPHROLOGY PROGRESS NOTE    Wendy Cummins 74 y o  male MRN: [id number]  Unit/Bed#: [place] 2 [facility] 87 222-01 Encounter: [id number]  Reason for Consult:  Acute renal insufficiency    ASSESSMENT/PLAN:  1  Renal     Patient acute renal insufficiency creatinine is now declined back to 1 1 has been stable and is at normal baseline  There was no proteinuria on urinalysis of there is no evidence of intrinsic kidney disease  Given th renal function is back to normal we will sign off  Please call if there is anything we can do to be of further assistance  Continue current dose of diuretic  SUBJECTIVE:  Review of Systems   Constitution: Negative  HENT: Negative  Eyes: Negative  Cardiovascular: Negative  Respiratory: Negative  Gastrointestinal: Negative  Genitourinary: Negative  OBJECTIVE:  Current Weight: Weight - Scale: (!) 158 kg (348 lb 5 2 oz)  Vitals:Temp (24hrs), Av °F (36 7 °C), Min:97 6 °F (36 4 °C), Max:98 7 °F (37 1 °C)  Current: Temperature: 97 8 °F (36 6 °C)   Blood pressure 137/66, pulse 80, temperature 97 8 °F (36 6 °C), temperature source Tympanic, resp  rate 18, height 6' 3" (1 905 m), weight (!) 158 kg (348 lb 5 2 oz), SpO2 93 %  , Body mass index is 43 54 kg/m²  Intake/Output Summary (Last 24 hours) at 18 1151  Last data filed at 18 0401   Gross per 24 hour   Intake                0 ml   Output             1300 ml   Net            -1300 ml       Physical Exam: /66 (BP Location: Left arm)   Pulse 80   Temp 97 8 °F (36 6 °C) (Tympanic)   Resp 18   Ht 6' 3" (1 905 m)   Wt (!) 158 kg (348 lb 5 2 oz)   SpO2 93%   BMI 43 54 kg/m²   Physical Exam   Constitutional: No distress  HENT:   Mouth/Throat: No oropharyngeal exudate  Eyes: No scleral icterus  Neck: Neck supple  No JVD present  Cardiovascular: Normal rate  Exam reveals no friction rub  Pulmonary/Chest: Effort normal and breath sounds normal  No respiratory distress   He has no wheezes  He has no rales  Abdominal: Soft  Bowel sounds are normal  He exhibits no distension  There is no tenderness  There is no rebound         Medications:    Current Facility-Administered Medications:     acetaminophen (TYLENOL) tablet 650 mg, 650 mg, Oral, Q6H PRN, Esau Lamb PA-C    albuterol (PROVENTIL HFA,VENTOLIN HFA) inhaler 2 puff, 2 puff, Inhalation, Q6H PRN, Esau Lamb PA-C    allopurinol (ZYLOPRIM) tablet 100 mg, 100 mg, Oral, Daily, Anna Davila MD, 100 mg at 03/26/18 0751    aluminum-magnesium hydroxide-simethicone (MYLANTA) 200-200-20 mg/5 mL oral suspension 15 mL, 15 mL, Oral, Q6H PRN, Esau Lamb PA-C, 15 mL at 03/25/18 1913    aspirin chewable tablet 81 mg, 81 mg, Oral, Daily, Anna Davila MD, 81 mg at 03/26/18 0753    bacitracin topical ointment 1 small application, 1 small application, Topical, BID, Naila Pique, 1 small application at 66/55/08 0752    fluticasone-salmeterol (ADVAIR) 250-50 mcg/dose inhaler 1 puff, 1 puff, Inhalation, Q12H [facility], Esau Lamb PA-C, 1 puff at 03/26/18 0753    furosemide (LASIX) tablet 80 mg, 80 mg, Oral, Daily, Delma Cervantes PA-C, 80 mg at 03/26/18 0753    heparin (porcine) subcutaneous injection 5,000 Units, 5,000 Units, Subcutaneous, Q8H [facility], 5,000 Units at 03/26/18 0500 **AND** Platelet count, , , Once, Esau Lamb PA-C    insulin detemir (LEVEMIR) subcutaneous injection 50 Units, 50 Units, Subcutaneous, Q12H [facility], Anna Davila MD, 50 Units at 03/26/18 0751    insulin lispro (HumaLOG) 100 units/mL subcutaneous injection 1-5 Units, 1-5 Units, Subcutaneous, HS, Esau Lamb PA-C, 2 Units at 03/25/18 2136    insulin lispro (HumaLOG) 100 units/mL subcutaneous injection 15 Units, 15 Units, Subcutaneous, TID With Meals, Anna Davila MD, 15 Units at 03/26/18 1148    insulin lispro (HumaLOG) 100 units/mL subcutaneous injection 2-12 Units, 2-12 Units, Subcutaneous, TID AC, 4 Units at 03/26/18 1149 **AND** Fingerstick Glucose (POCT), , , TID AC, Aron Adler PA-C    metoprolol tartrate (LOPRESSOR) tablet 25 mg, 25 mg, Oral, BID, Aron Adler PA-C, 25 mg at 03/26/18 0752    ondansetron (ZOFRAN) injection 4 mg, 4 mg, Intravenous, Q6H PRN, Aron Adler PA-C    sodium hypochlorite (DAKIN'S HALF-STRENGTH) 0 25 percent topical solution 1 application, 1 application, Irrigation, Daily, Olga Howell DPM, 1 application at 20/41/29 0757    Laboratory Results:  Lab Results   Component Value Date    WBC 6 52 03/23/2018    HGB 13 3 03/23/2018    HCT 40 9 03/23/2018    MCV 88 03/23/2018     03/23/2018     03/23/2018     Lab Results   Component Value Date    GLUCOSE 309 (H) 03/26/2018    CALCIUM 8 9 03/26/2018     03/26/2018    K 4 4 03/26/2018    CO2 26 03/26/2018     03/26/2018    BUN 36 (H) 03/26/2018    CREATININE 1 13 03/26/2018     Lab Results   Component Value Date    CALCIUM 8 9 03/26/2018     No results found for: LABPROT

## 2018-03-26 NOTE — SOCIAL WORK
CM received a reply in [facility] that patient would be accepted at [facility]  Informed them patient would be accepting the bed as [facility] was unable to accomodate  Faxed over documention for the insurance authorization  Updated patient and let him know that hopefully later on today the auth would be received  Patient will be transported by his s/o, Natacha Curran to facility  Authorization received from Uzma at [facility]  Patient has been approved at a subacute level 2  Auth# is [id number]  Last cover day is 4/2/18  At that time Antionette must reach out to [facility]; she can be reached at , and her fax is   Informed: attending, RN-who was going to inform patient, unit clerk, and CM notified facility via [facility], providing all the [address] to them as well

## 2018-03-26 NOTE — PHYSICAL THERAPY NOTE
Physical Therapy Treatment Note       03/26/18 0935   Pain Assessment   Pain Assessment No/denies pain   Pain Score No Pain   Restrictions/Precautions   Weight Bearing Precautions Per Order Yes   LLE Weight Bearing Per Order NWB   Other Precautions Fall Risk;WBS   General   Chart Reviewed Yes   Response to Previous Treatment Patient with no complaints from previous session  Family/Caregiver Present No   Cognition   Overall Cognitive Status WFL   Subjective   Subjective " It goping to take a long time to heal "     Transfers   Sit to Stand 3  Moderate assistance   Additional items Assist x 2; Increased time required;Verbal cues  (bed height elevated)   Stand to Sit 4  Minimal assistance   Additional items Assist x 2;Armrests; Verbal cues; Increased time required   Stand pivot 3  Moderate assistance   Additional items Assist x 2;Verbal cues; Increased time required   Additional Comments attempted hopping steps with use of Rw  Ambulation/Elevation   Gait pattern Poor UE support;Decreased foot clearance; Short stride   Gait Assistance 3  Moderate assist   Additional items Assist x 2;Verbal cues   Assistive Device Rolling walker   Distance 1'x2,    Balance   Static Sitting Good   Static Standing Fair   Dynamic Standing Fair -   Ambulatory Poor +   Endurance Deficit   Endurance Deficit Yes   Activity Tolerance   Activity Tolerance Patient limited by fatigue   Nurse Made Aware PCA, [place]   Exercises   [employer] Supine;10 reps;AROM; Bilateral   Hip Abduction Sitting;10 reps;AROM; Left   Hip Adduction Sitting;10 reps;AROM; Bilateral   Knee AROM Long Arc Quad Sitting;10 reps;AROM; Left   Ankle Pumps Sitting;10 reps;AROM; Bilateral   Balance training  sitting w/c push-ups  5 x 10 reps  Assessment   Prognosis Good   Problem List Decreased strength;Decreased endurance; Impaired balance;Decreased mobility; Decreased safety awareness; Obesity; Decreased skin integrity;Orthopedic restrictions   Assessment Pt demonstrates difficulty this session in maintaining NWB to the R le with sit to stand transfers  PT able to maintain NWB with static standing with verbal cues for ue weightbearing techniques improved posture and reminders to keep R foot off floor  PT  Performed 1' x2 with mod assist x2 and moderate verbal cues for technique  TP  Is easily fatigued  PT peformed stand pivot tranfers from bed to chair with mod assist x2  Pt  Performs seated b/l le arom exercises with verbal cues and demonstration for correct exercise techniques  Pt issued written le supine HEP  Recommend STR at d/c inorder to progress mobility and maximize safety and functional independence  Goals   Patient Goals " For my foot to heal '   STG Expiration Date 04/02/18   Treatment Day 1   Plan   Treatment/Interventions Functional transfer training;LE strengthening/ROM; Therapeutic exercise; Endurance training;Patient/family training;Equipment eval/education; Bed mobility;Gait training;Spoke to nursing   Progress Slow progress, decreased activity tolerance   PT Frequency 5x/wk   Recommendation   Recommendation Short-term skilled PT   PT - OK to Discharge Yes  (to rehab)       Tyesha Johnson, PTA

## 2018-03-26 NOTE — OCCUPATIONAL THERAPY NOTE
Occupational Therapy Treatment Note:         03/26/18 1235   Restrictions/Precautions   Weight Bearing Precautions Per Order Yes   LLE Weight Bearing Per Order NWB   Other Precautions Cognitive; Fall Risk;Pain   Pain Assessment   Pain Assessment No/denies pain   Pain Score No Pain   ADL   Where Assessed Edge of bed   Grooming Assistance 5  Supervision/Setup   Grooming Deficit Setup   UB Bathing Assistance 4  Minimal Assistance   UB Bathing Deficit Setup   LB Bathing Assistance 3  Moderate Assistance   LB Bathing Deficit Setup;Steadying;Verbal cueing;Supervision/safety; Increased time to complete; Buttocks; Perineal area; Left lower leg including foot;Right lower leg including foot   LB Bathing Comments Cues for safety required  UB Dressing Assistance 5  Supervision/Setup   UB Dressing Deficit Setup   LB Dressing Assistance 3  Moderate Assistance   LB Dressing Deficit Setup; Impulsive;Steadying; Requires assistive device for steadying;Supervision/safety;Verbal cueing; Increased time to complete; Don/doff L sock; Don/doff R sock; Thread LLE into underwear; Thread RLE into underwear;Pull up over hips   LB Dressing Comments cues for safe use of bridging techs provided  Toileting Assistance  3  Moderate Assistance   Toileting Deficit Setup;Steadying;Supervison/safety;Verbal cueing; Increased time to complete;Clothing management up;Clothing management down;Perineal hygiene; Bedside commode   Toileting Comments Pt will benefit from further review of use of extra wide commode  Functional Standing Tolerance   Time 2 mins  Transfers   Sit to Stand 4  Minimal assistance   Additional items Assist x 1   Stand to Sit 4  Minimal assistance   Additional items Assist x 1   Stand pivot 3  Moderate assistance   Additional items Assist x 2; Increased time required;Verbal cues;Armrests; Bedrails   Additional Comments Pt with poor attention to tasks noted      Functional Mobility   Functional Mobility 3  Moderate assistance   Additional Comments x2   Additional items Rolling walker   Activity Tolerance   Activity Tolerance Patient limited by pain; Patient limited by fatigue   Medical Staff Made Aware reported all findings to nursing staff  Assessment   Assessment Pt was seen for skilled OT with focus on self care tasks, functional transfers and review of RW safety, NWB status  Pt required moderate redirection and reassurance  Pt reports having his water and electric shut off  Forwarded all information to case management  Pt required Min A overall for sit to stand at [facility]  Mod A for attempted mobility  Limited carry over of reviewed information  Hands on A required due to impulsive movements noted  Pt with impulsive movements  See above levels of A required for all functional tasks  Pt will benefit from further rehab with focus on achieving optimal performance levels with all functional tasks  Plan   Treatment Interventions ADL retraining;Functional transfer training; Endurance training;Cognitive reorientation   Goal Expiration Date 04/02/18   Treatment Day 1   OT Frequency 3-5x/wk   Recommendation   OT Discharge Recommendation Short Term Rehab   Barthel Index   Feeding 10   Bathing 0   Grooming Score 5   Dressing Score 5   Bladder Score 10   Bowels Score 10   Toilet Use Score 5   Transfers (Bed/Chair) Score 5   Mobility (Level Surface) Score 0   Stairs Score 0   Barthel Index Score 50   Modified Wilson Scale   Modified Wilson Scale 4   Den Cook, [address]

## 2018-03-27 ENCOUNTER — TELEPHONE (OUTPATIENT)
Dept: FAMILY MEDICINE CLINIC | Facility: CLINIC | Age: 68
End: 2018-03-27

## 2018-03-27 NOTE — CASE MANAGEMENT
Notification of Discharge  This is a Notification of Discharge from our facility [address]  Please be advised that this patient has been discharge from our facility  Below you will find the admission and discharge date and time including the patients disposition  PRESENTATION DATE: 3/23/2018  1:30 AM  IP ADMISSION DATE: 3/23/18 0420  DISCHARGE DATE: 3/26/2018  6:03 PM  DISPOSITION: Non [facility] SNF/TCU/SNU    [facility] in the [facility] by [facility] for 2017  Network Utilization Review Department  Phone: [phone]; Fax [phone]  ATTENTION: The Network Utilization Review Department is now centralized for our 7 Facilities  Make a note that we have a new phone and fax numbers for our Department  Please call with any questions or concerns to [phone] and carefully follow the prompts so that you are directed to the right person  All voicemails are confidential  Fax any determinations, approvals, denials, and requests for initial or continue stay review clinical to [phone]  Due to HIGH CALL volume, it would be easier if you could please send faxed requests to expedite your requests and in part, help us provide discharge notifications faster

## 2018-03-28 LAB
BACTERIA BLD CULT: NORMAL
BACTERIA BLD CULT: NORMAL

## 2018-04-05 ENCOUNTER — APPOINTMENT (OUTPATIENT)
Dept: WOUND CARE | Facility: HOSPITAL | Age: 68
End: 2018-04-05
Attending: PODIATRIST
Payer: COMMERCIAL

## 2018-04-05 PROCEDURE — 99213 OFFICE O/P EST LOW 20 MIN: CPT

## 2018-04-05 PROCEDURE — 11045 DBRDMT SUBQ TISS EACH ADDL: CPT | Performed by: PODIATRIST

## 2018-04-05 PROCEDURE — 11042 DBRDMT SUBQ TIS 1ST 20SQCM/<: CPT

## 2018-04-06 ENCOUNTER — TELEPHONE (OUTPATIENT)
Dept: CARDIOLOGY CLINIC | Facility: CLINIC | Age: 68
End: 2018-04-06

## 2018-04-06 RX ORDER — INSULIN DETEMIR 100 [IU]/ML
INJECTION, SOLUTION SUBCUTANEOUS
COMMUNITY
Start: 2018-03-15 | End: 2018-04-16 | Stop reason: ALTCHOICE

## 2018-04-06 NOTE — TELEPHONE ENCOUNTER
Beginning of April he was 343 8lbs yesterday he was 346 8lbs  And today 351 8  No edema, no SOB and no other complaint or Sx's  Klaudia Maravilla form the [facility] that he had been ordering double portion from the kitchen for lunch and dinner  He medical doctor at [address] is aware  He is on Furosemide 80mg BID and on Spironolactone 25mg QD  Please advise

## 2018-04-10 ENCOUNTER — TELEPHONE (OUTPATIENT)
Dept: FAMILY MEDICINE CLINIC | Facility: CLINIC | Age: 68
End: 2018-04-10

## 2018-04-10 NOTE — TELEPHONE ENCOUNTER
PATIENT ALREADY HAS AN APPT FOR 04/20/2018 FOR A REGULAR OFFICE VISIT  BUT PATIENT WAS JUST DISCHARGE FROM NURSING HOME TODAY AND WOULD NEED A TCM

## 2018-04-11 ENCOUNTER — TRANSITIONAL CARE MANAGEMENT (OUTPATIENT)
Dept: FAMILY MEDICINE CLINIC | Facility: CLINIC | Age: 68
End: 2018-04-11

## 2018-04-11 ENCOUNTER — TELEPHONE (OUTPATIENT)
Dept: FAMILY MEDICINE CLINIC | Facility: CLINIC | Age: 68
End: 2018-04-11

## 2018-04-11 RX ORDER — PEN NEEDLE, DIABETIC 32GX 5/32"
NEEDLE, DISPOSABLE MISCELLANEOUS
Refills: 0 | OUTPATIENT
Start: 2018-04-11

## 2018-04-11 NOTE — TELEPHONE ENCOUNTER
Kirstie Champion from home care wanted to say patient has home care, physical therapy, and occupational therapy  Also wanted to let you know his diabetic medications levemir 50 units BID, metformin 1000mg BID, and lasix 80mg BID  He states patient only checks sugar once daily  Patient also has an ulcer pn hisleft heel   If you have any questions call [phone]

## 2018-04-12 ENCOUNTER — APPOINTMENT (OUTPATIENT)
Dept: WOUND CARE | Facility: HOSPITAL | Age: 68
End: 2018-04-12
Attending: PODIATRIST
Payer: COMMERCIAL

## 2018-04-12 PROCEDURE — 11042 DBRDMT SUBQ TIS 1ST 20SQCM/<: CPT | Performed by: PODIATRIST

## 2018-04-16 ENCOUNTER — OFFICE VISIT (OUTPATIENT)
Dept: FAMILY MEDICINE CLINIC | Facility: CLINIC | Age: 68
End: 2018-04-16
Payer: COMMERCIAL

## 2018-04-16 VITALS
HEART RATE: 72 BPM | DIASTOLIC BLOOD PRESSURE: 80 MMHG | SYSTOLIC BLOOD PRESSURE: 122 MMHG | HEIGHT: 75 IN | TEMPERATURE: 98 F | WEIGHT: 315 LBS | BODY MASS INDEX: 39.17 KG/M2

## 2018-04-16 DIAGNOSIS — IMO0002 UNCONTROLLED TYPE 2 DIABETES MELLITUS WITH COMPLICATION, WITH LONG-TERM CURRENT USE OF INSULIN: ICD-10-CM

## 2018-04-16 DIAGNOSIS — R60.0 LOCALIZED EDEMA: ICD-10-CM

## 2018-04-16 DIAGNOSIS — I10 ESSENTIAL HYPERTENSION: ICD-10-CM

## 2018-04-16 DIAGNOSIS — L97.422 CHRONIC HEEL ULCER, LEFT, WITH FAT LAYER EXPOSED (HCC): Primary | ICD-10-CM

## 2018-04-16 PROCEDURE — 99496 TRANSJ CARE MGMT HIGH F2F 7D: CPT | Performed by: FAMILY MEDICINE

## 2018-04-16 RX ORDER — SPIRONOLACTONE 25 MG/1
25 TABLET ORAL DAILY
Qty: 30 TABLET | Refills: 5 | Status: SHIPPED | OUTPATIENT
Start: 2018-04-16 | End: 2018-09-17 | Stop reason: SDUPTHER

## 2018-04-16 RX ORDER — TORSEMIDE 20 MG/1
20 TABLET ORAL 2 TIMES DAILY
Qty: 120 TABLET | Refills: 5 | Status: SHIPPED | OUTPATIENT
Start: 2018-04-16 | End: 2018-09-17 | Stop reason: SDUPTHER

## 2018-04-16 RX ORDER — FLUTICASONE FUROATE AND VILANTEROL 100; 25 UG/1; UG/1
1 POWDER RESPIRATORY (INHALATION)
COMMUNITY
End: 2018-09-17 | Stop reason: SDUPTHER

## 2018-04-16 RX ORDER — AMINO ACIDS/PROTEIN HYDROLYS 11G-40/45
1 LIQUID IN PACKET (ML) ORAL 2 TIMES DAILY
Qty: 1 BOTTLE | Refills: 2 | Status: SHIPPED | OUTPATIENT
Start: 2018-04-16 | End: 2019-03-15 | Stop reason: ALTCHOICE

## 2018-04-16 RX ORDER — PEN NEEDLE, DIABETIC 32GX 5/32"
NEEDLE, DISPOSABLE MISCELLANEOUS
COMMUNITY
Start: 2018-04-12 | End: 2018-04-23 | Stop reason: SDUPTHER

## 2018-04-16 NOTE — PROGRESS NOTES
Assessment/Plan:    No problem-specific Assessment & Plan notes found for this encounter  There are no diagnoses linked to this encounter  Subjective:      Patient ID: Andria Gonzalez is a 74 y o  male  Date and time hospital follow up call was made:  4/11/2018 11:33 AM  Patient was hopsitalized at:  [facility]  Date of admission:  3/24/18  Date of discharge:  3/26/18  Diagnosis:  ULCER IN FOOT  Were the patients medicaitons reviewed and updated:  Yes  Current symptoms:  Loose Stools  Loose stool severity:  Moderate  Post hospital issues:  None  Scheduled for follow up?:  Yes  Did you obtain your prescribed medications:  Yes  Do you need help managing your perscriptions or medications:  No  Is transportation to your appointments needed:  No  I have advised the patient to call PCP with any new or worsening symptoms (please type in name along with any credentials):  Y R   Living Arrangements:  Family members  Are you recieving outpatient services:  No  Are you recieving home care services:  Yes  Types of home care services:  Nurse visit  Are you using any community resources:  No  Current waiver service:  No  Have you fallen in the last 12 months:  No  Interperter language line required?:  No  Counseling:  Patient       Rash   This is a recurrent (recurrent ulcer) problem  The current episode started more than 1 year ago  The problem has been waxing and waning since onset  The affected locations include the left foot  The rash is characterized by redness, peeling and draining  He was exposed to nothing  Associated symptoms include diarrhea  Pertinent negatives include no fever or shortness of breath         The following portions of the patient's history were reviewed and updated as appropriate: allergies, current medications, past family history, past medical history, past social history, past surgical history and problem list       Review of Systems   Constitutional: Negative for chills, fever and unexpected weight change  Respiratory: Negative for shortness of breath  Cardiovascular: Negative for chest pain  Gastrointestinal: Positive for diarrhea  Skin: Positive for rash  Objective:      /80   Pulse 72   Temp 98 °F (36 7 °C)   Ht 6' 3" (1 905 m)   Wt (!) 160 kg (352 lb)   BMI 44 00 kg/m²          Physical Exam   Constitutional: He appears well-developed and well-nourished  Neck: No thyromegaly present  Cardiovascular: Normal rate, regular rhythm and normal heart sounds  Musculoskeletal: He exhibits edema  Lymphadenopathy:     He has no cervical adenopathy     Skin:   3 cm diabetic foot ulcer-under care of wound care for this

## 2018-04-16 NOTE — PATIENT INSTRUCTIONS
Discussed with pt that recurrent nature of his ulcer and poor healing may cause eventually amputation of  Foot, stop furosemide and change to torsemide and see if edema improves

## 2018-04-19 ENCOUNTER — TELEPHONE (OUTPATIENT)
Dept: FAMILY MEDICINE CLINIC | Facility: CLINIC | Age: 68
End: 2018-04-19

## 2018-04-19 ENCOUNTER — APPOINTMENT (OUTPATIENT)
Dept: WOUND CARE | Facility: HOSPITAL | Age: 68
End: 2018-04-19
Attending: PODIATRIST
Payer: COMMERCIAL

## 2018-04-19 PROCEDURE — 97597 DBRDMT OPN WND 1ST 20 CM/<: CPT | Performed by: PODIATRIST

## 2018-04-23 ENCOUNTER — APPOINTMENT (OUTPATIENT)
Dept: LAB | Facility: CLINIC | Age: 68
End: 2018-04-23
Payer: COMMERCIAL

## 2018-04-23 ENCOUNTER — TRANSCRIBE ORDERS (OUTPATIENT)
Dept: LAB | Facility: CLINIC | Age: 68
End: 2018-04-23

## 2018-04-23 ENCOUNTER — TELEPHONE (OUTPATIENT)
Dept: FAMILY MEDICINE CLINIC | Facility: CLINIC | Age: 68
End: 2018-04-23

## 2018-04-23 DIAGNOSIS — R60.0 LOCALIZED EDEMA: ICD-10-CM

## 2018-04-23 DIAGNOSIS — IMO0002 UNCONTROLLED TYPE 2 DIABETES MELLITUS WITH COMPLICATION, WITH LONG-TERM CURRENT USE OF INSULIN: ICD-10-CM

## 2018-04-23 DIAGNOSIS — L97.509 CONTROLLED TYPE 2 DIABETES MELLITUS WITH FOOT ULCER, WITH LONG-TERM CURRENT USE OF INSULIN (HCC): Primary | ICD-10-CM

## 2018-04-23 DIAGNOSIS — E11.621 CONTROLLED TYPE 2 DIABETES MELLITUS WITH FOOT ULCER, WITH LONG-TERM CURRENT USE OF INSULIN (HCC): Primary | ICD-10-CM

## 2018-04-23 DIAGNOSIS — Z79.4 CONTROLLED TYPE 2 DIABETES MELLITUS WITH FOOT ULCER, WITH LONG-TERM CURRENT USE OF INSULIN (HCC): Primary | ICD-10-CM

## 2018-04-23 LAB
ANION GAP SERPL CALCULATED.3IONS-SCNC: 6 MMOL/L (ref 4–13)
BUN SERPL-MCNC: 34 MG/DL (ref 5–25)
CALCIUM SERPL-MCNC: 9 MG/DL (ref 8.3–10.1)
CHLORIDE SERPL-SCNC: 104 MMOL/L (ref 100–108)
CHOLEST SERPL-MCNC: 120 MG/DL (ref 50–200)
CO2 SERPL-SCNC: 29 MMOL/L (ref 21–32)
CREAT SERPL-MCNC: 1.05 MG/DL (ref 0.6–1.3)
GFR SERPL CREATININE-BSD FRML MDRD: 74 ML/MIN/1.73SQ M
GLUCOSE P FAST SERPL-MCNC: 168 MG/DL (ref 65–99)
HDLC SERPL-MCNC: 38 MG/DL (ref 40–60)
LDLC SERPL CALC-MCNC: 54 MG/DL (ref 0–100)
POTASSIUM SERPL-SCNC: 4.4 MMOL/L (ref 3.5–5.3)
SODIUM SERPL-SCNC: 139 MMOL/L (ref 136–145)
TRIGL SERPL-MCNC: 141 MG/DL

## 2018-04-23 PROCEDURE — 80048 BASIC METABOLIC PNL TOTAL CA: CPT

## 2018-04-23 PROCEDURE — 36415 COLL VENOUS BLD VENIPUNCTURE: CPT

## 2018-04-23 PROCEDURE — 80061 LIPID PANEL: CPT

## 2018-04-23 RX ORDER — PEN NEEDLE, DIABETIC 32GX 5/32"
NEEDLE, DISPOSABLE MISCELLANEOUS 3 TIMES DAILY
Qty: 100 EACH | Refills: 3 | Status: SHIPPED | OUTPATIENT
Start: 2018-04-23 | End: 2018-09-17 | Stop reason: CLARIF

## 2018-04-23 NOTE — TELEPHONE ENCOUNTER
Pt wants the flex-pen for his novolog not the solution, could you please resend along with needle tips   Thank you

## 2018-04-24 ENCOUNTER — TELEPHONE (OUTPATIENT)
Dept: FAMILY MEDICINE CLINIC | Facility: CLINIC | Age: 68
End: 2018-04-24

## 2018-04-26 ENCOUNTER — APPOINTMENT (OUTPATIENT)
Dept: WOUND CARE | Facility: HOSPITAL | Age: 68
End: 2018-04-26
Attending: PODIATRIST
Payer: COMMERCIAL

## 2018-04-26 PROCEDURE — 11042 DBRDMT SUBQ TIS 1ST 20SQCM/<: CPT | Performed by: PODIATRIST

## 2018-05-01 ENCOUNTER — TELEPHONE (OUTPATIENT)
Dept: FAMILY MEDICINE CLINIC | Facility: CLINIC | Age: 68
End: 2018-05-01

## 2018-05-01 NOTE — TELEPHONE ENCOUNTER
Jeanie Leo from [facility] OT called to advised pt was discharged from OT home health due to pt non compliant  Any question pls call Jeanie Leo at [phone]

## 2018-05-03 ENCOUNTER — APPOINTMENT (OUTPATIENT)
Dept: WOUND CARE | Facility: HOSPITAL | Age: 68
End: 2018-05-03
Attending: PODIATRIST
Payer: COMMERCIAL

## 2018-05-03 PROCEDURE — 97597 DBRDMT OPN WND 1ST 20 CM/<: CPT | Performed by: PODIATRIST

## 2018-05-10 ENCOUNTER — APPOINTMENT (OUTPATIENT)
Dept: WOUND CARE | Facility: HOSPITAL | Age: 68
End: 2018-05-10
Attending: PODIATRIST
Payer: COMMERCIAL

## 2018-05-10 PROCEDURE — 97597 DBRDMT OPN WND 1ST 20 CM/<: CPT | Performed by: PODIATRIST

## 2018-05-17 ENCOUNTER — APPOINTMENT (OUTPATIENT)
Dept: WOUND CARE | Facility: HOSPITAL | Age: 68
End: 2018-05-17
Attending: PODIATRIST
Payer: COMMERCIAL

## 2018-05-17 PROCEDURE — 97597 DBRDMT OPN WND 1ST 20 CM/<: CPT

## 2018-05-31 ENCOUNTER — APPOINTMENT (OUTPATIENT)
Dept: WOUND CARE | Facility: HOSPITAL | Age: 68
End: 2018-05-31
Attending: PODIATRIST
Payer: COMMERCIAL

## 2018-05-31 PROCEDURE — 11042 DBRDMT SUBQ TIS 1ST 20SQCM/<: CPT | Performed by: PODIATRIST

## 2018-06-05 ENCOUNTER — TELEPHONE (OUTPATIENT)
Dept: FAMILY MEDICINE CLINIC | Facility: CLINIC | Age: 68
End: 2018-06-05

## 2018-06-05 NOTE — TELEPHONE ENCOUNTER
Pt was under the impression that the office had given him a call  Pt was advise that we have not reached out to him

## 2018-06-05 NOTE — TELEPHONE ENCOUNTER
Gela Monique from [facility] home care states that patient missed his appt today and patient might not be home bound, and he might have to discharge patient  Please advise if you have any questions

## 2018-06-06 NOTE — TELEPHONE ENCOUNTER
Call can not be completed at this time will try again later   House and cellphone number are still the same

## 2018-06-07 ENCOUNTER — APPOINTMENT (OUTPATIENT)
Dept: WOUND CARE | Facility: HOSPITAL | Age: 68
End: 2018-06-07
Attending: PODIATRIST
Payer: COMMERCIAL

## 2018-06-07 PROCEDURE — 11045 DBRDMT SUBQ TISS EACH ADDL: CPT | Performed by: PODIATRIST

## 2018-06-07 PROCEDURE — 11042 DBRDMT SUBQ TIS 1ST 20SQCM/<: CPT | Performed by: PODIATRIST

## 2018-06-07 NOTE — TELEPHONE ENCOUNTER
Patient states he was home, he was on the front porch, and the pola came in through the back door  Patient states sabine did take care of hm

## 2018-06-09 DIAGNOSIS — M1A.9XX0 CHRONIC GOUT WITHOUT TOPHUS, UNSPECIFIED CAUSE, UNSPECIFIED SITE: Primary | ICD-10-CM

## 2018-06-10 RX ORDER — ALLOPURINOL 300 MG/1
TABLET ORAL
Qty: 90 TABLET | Refills: 1 | Status: SHIPPED | OUTPATIENT
Start: 2018-06-10 | End: 2019-03-01 | Stop reason: SDUPTHER

## 2018-06-14 ENCOUNTER — APPOINTMENT (OUTPATIENT)
Dept: WOUND CARE | Facility: HOSPITAL | Age: 68
End: 2018-06-14
Attending: PODIATRIST
Payer: COMMERCIAL

## 2018-06-14 DIAGNOSIS — I73.9 PVD (PERIPHERAL VASCULAR DISEASE) (HCC): Primary | ICD-10-CM

## 2018-06-14 PROCEDURE — 97598 DBRDMT OPN WND ADDL 20CM/<: CPT | Performed by: PODIATRIST

## 2018-06-14 PROCEDURE — 97597 DBRDMT OPN WND 1ST 20 CM/<: CPT | Performed by: PODIATRIST

## 2018-06-21 ENCOUNTER — APPOINTMENT (OUTPATIENT)
Dept: WOUND CARE | Facility: HOSPITAL | Age: 68
End: 2018-06-21
Attending: PODIATRIST
Payer: COMMERCIAL

## 2018-06-21 PROCEDURE — 11042 DBRDMT SUBQ TIS 1ST 20SQCM/<: CPT | Performed by: PODIATRIST

## 2018-06-28 ENCOUNTER — APPOINTMENT (OUTPATIENT)
Dept: WOUND CARE | Facility: HOSPITAL | Age: 68
End: 2018-06-28
Attending: PODIATRIST
Payer: COMMERCIAL

## 2018-06-28 PROCEDURE — 97597 DBRDMT OPN WND 1ST 20 CM/<: CPT | Performed by: PODIATRIST

## 2018-07-05 ENCOUNTER — APPOINTMENT (OUTPATIENT)
Dept: WOUND CARE | Facility: HOSPITAL | Age: 68
End: 2018-07-05
Attending: PODIATRIST
Payer: COMMERCIAL

## 2018-07-05 PROCEDURE — 11042 DBRDMT SUBQ TIS 1ST 20SQCM/<: CPT

## 2018-07-26 ENCOUNTER — APPOINTMENT (OUTPATIENT)
Dept: WOUND CARE | Facility: HOSPITAL | Age: 68
End: 2018-07-26
Attending: PODIATRIST
Payer: COMMERCIAL

## 2018-07-26 PROCEDURE — 11042 DBRDMT SUBQ TIS 1ST 20SQCM/<: CPT | Performed by: PODIATRIST

## 2018-08-09 ENCOUNTER — APPOINTMENT (OUTPATIENT)
Dept: WOUND CARE | Facility: HOSPITAL | Age: 68
End: 2018-08-09
Attending: PODIATRIST
Payer: COMMERCIAL

## 2018-08-09 PROCEDURE — 11042 DBRDMT SUBQ TIS 1ST 20SQCM/<: CPT | Performed by: PODIATRIST

## 2018-08-23 ENCOUNTER — APPOINTMENT (OUTPATIENT)
Dept: WOUND CARE | Facility: HOSPITAL | Age: 68
End: 2018-08-23
Attending: PODIATRIST
Payer: COMMERCIAL

## 2018-08-23 PROCEDURE — 97597 DBRDMT OPN WND 1ST 20 CM/<: CPT | Performed by: PODIATRIST

## 2018-08-29 ENCOUNTER — TELEPHONE (OUTPATIENT)
Dept: FAMILY MEDICINE CLINIC | Facility: CLINIC | Age: 68
End: 2018-08-29

## 2018-08-29 NOTE — TELEPHONE ENCOUNTER
Patient nurse Lauren Birmingham  called In and stated she will like to do lymphedema wrapping for patients wounds and she will like to know if that is okay please advise

## 2018-08-30 NOTE — TELEPHONE ENCOUNTER
Sylvester Johnson was notified  She only needed a Verbal,  Per Sylvester Johnson she will fax over a summary for the Dr to review

## 2018-08-31 DIAGNOSIS — I73.9 PERIPHERAL VASCULAR DISEASE (HCC): ICD-10-CM

## 2018-09-05 ENCOUNTER — TELEPHONE (OUTPATIENT)
Dept: FAMILY MEDICINE CLINIC | Facility: CLINIC | Age: 68
End: 2018-09-05

## 2018-09-05 NOTE — TELEPHONE ENCOUNTER
Elaine from [address] PT called stating pt was evaluated yesterday for pt  Elaine is recommending additional pt sessions to improve pt balance  Elaine needs verbal authorization  Pls advise

## 2018-09-06 ENCOUNTER — APPOINTMENT (OUTPATIENT)
Dept: WOUND CARE | Facility: HOSPITAL | Age: 68
End: 2018-09-06
Attending: PODIATRIST
Payer: COMMERCIAL

## 2018-09-06 PROCEDURE — 11042 DBRDMT SUBQ TIS 1ST 20SQCM/<: CPT

## 2018-09-07 ENCOUNTER — TELEPHONE (OUTPATIENT)
Dept: FAMILY MEDICINE CLINIC | Facility: CLINIC | Age: 68
End: 2018-09-07

## 2018-09-07 NOTE — TELEPHONE ENCOUNTER
Home care wants a call back because patient blood sugars are 339 before eating, and the only thing he took today is his metformin  [phone]  And he also needs any insulin order, and if you're going to change any med's as well fax to him   Fax# [phone]

## 2018-09-10 NOTE — TELEPHONE ENCOUNTER
Patient has a vial of novolog is 25 units TID  But he is not taking anything else due to him not affording it

## 2018-09-10 NOTE — TELEPHONE ENCOUNTER
Kelley Vidal states they already had a  come out, and he states he called the pharmacy and every 3 month is about $330 a month for meds  VNA states he can afford it but he does not want to  VNA he also has open wounds as well, and he is following up with dr Prakash Fox from [facility] wound center from [facility]

## 2018-09-10 NOTE — TELEPHONE ENCOUNTER
Brennacarol Dawson states he is taking novolog is 25 units daily TID, he has levemir 50 units BID, and metformin 1000mg bid  He does not use the levemir or novolog every day, and he does not check his sugars unless the [facility]NA shows up

## 2018-09-10 NOTE — TELEPHONE ENCOUNTER
He needs to apply for some sort of assistance with meds-needs to restart insulin, call vna and see if  can help him get his meds

## 2018-09-12 ENCOUNTER — TELEPHONE (OUTPATIENT)
Dept: FAMILY MEDICINE CLINIC | Facility: CLINIC | Age: 68
End: 2018-09-12

## 2018-09-12 NOTE — TELEPHONE ENCOUNTER
Melissa Rdz called in for patient and stated rx for insulin cost was too high pt will like to know if you can call in a different insulin to local [facility] pharmacy  please advise

## 2018-09-14 NOTE — TELEPHONE ENCOUNTER
If he wants less expensive insulin it is his job to call pharmacy or insurance and see what is preferred, that is not our job, when he finds out what is more affordable he should let us know so I can send rx

## 2018-09-16 DIAGNOSIS — Z79.4 TYPE 2 DIABETES MELLITUS WITH OTHER CIRCULATORY COMPLICATION, WITH LONG-TERM CURRENT USE OF INSULIN (HCC): Primary | ICD-10-CM

## 2018-09-16 DIAGNOSIS — E11.59 TYPE 2 DIABETES MELLITUS WITH OTHER CIRCULATORY COMPLICATION, WITH LONG-TERM CURRENT USE OF INSULIN (HCC): Primary | ICD-10-CM

## 2018-09-17 ENCOUNTER — TELEPHONE (OUTPATIENT)
Dept: FAMILY MEDICINE CLINIC | Facility: CLINIC | Age: 68
End: 2018-09-17

## 2018-09-17 ENCOUNTER — OFFICE VISIT (OUTPATIENT)
Dept: FAMILY MEDICINE CLINIC | Facility: CLINIC | Age: 68
End: 2018-09-17
Payer: COMMERCIAL

## 2018-09-17 VITALS
DIASTOLIC BLOOD PRESSURE: 78 MMHG | BODY MASS INDEX: 39.17 KG/M2 | RESPIRATION RATE: 20 BRPM | WEIGHT: 315 LBS | SYSTOLIC BLOOD PRESSURE: 124 MMHG | HEART RATE: 74 BPM | HEIGHT: 75 IN | OXYGEN SATURATION: 94 %

## 2018-09-17 DIAGNOSIS — I10 ESSENTIAL HYPERTENSION: ICD-10-CM

## 2018-09-17 DIAGNOSIS — IMO0002 UNCONTROLLED TYPE 2 DIABETES MELLITUS WITH COMPLICATION, WITH LONG-TERM CURRENT USE OF INSULIN: ICD-10-CM

## 2018-09-17 DIAGNOSIS — J45.40 MODERATE PERSISTENT ASTHMA WITHOUT COMPLICATION: ICD-10-CM

## 2018-09-17 DIAGNOSIS — R60.0 LOCALIZED EDEMA: ICD-10-CM

## 2018-09-17 DIAGNOSIS — Z23 NEED FOR INFLUENZA VACCINATION: ICD-10-CM

## 2018-09-17 DIAGNOSIS — IMO0002 UNCONTROLLED TYPE 2 DIABETES MELLITUS WITH DIABETIC POLYNEUROPATHY, WITH LONG-TERM CURRENT USE OF INSULIN: Primary | ICD-10-CM

## 2018-09-17 PROBLEM — M10.9 ACUTE GOUT: Status: RESOLVED | Noted: 2017-10-09 | Resolved: 2018-09-17

## 2018-09-17 LAB
CREAT UR-MCNC: 31.4 MG/DL
MICROALBUMIN UR-MCNC: <5 MG/L (ref 0–20)
MICROALBUMIN/CREAT 24H UR: <16 MG/G CREATININE (ref 0–30)

## 2018-09-17 PROCEDURE — 1036F TOBACCO NON-USER: CPT | Performed by: FAMILY MEDICINE

## 2018-09-17 PROCEDURE — 82570 ASSAY OF URINE CREATININE: CPT | Performed by: FAMILY MEDICINE

## 2018-09-17 PROCEDURE — 3074F SYST BP LT 130 MM HG: CPT | Performed by: FAMILY MEDICINE

## 2018-09-17 PROCEDURE — 1101F PT FALLS ASSESS-DOCD LE1/YR: CPT | Performed by: FAMILY MEDICINE

## 2018-09-17 PROCEDURE — 4010F ACE/ARB THERAPY RXD/TAKEN: CPT | Performed by: FAMILY MEDICINE

## 2018-09-17 PROCEDURE — 90662 IIV NO PRSV INCREASED AG IM: CPT

## 2018-09-17 PROCEDURE — 3008F BODY MASS INDEX DOCD: CPT | Performed by: FAMILY MEDICINE

## 2018-09-17 PROCEDURE — 82043 UR ALBUMIN QUANTITATIVE: CPT | Performed by: FAMILY MEDICINE

## 2018-09-17 PROCEDURE — G0008 ADMIN INFLUENZA VIRUS VAC: HCPCS

## 2018-09-17 PROCEDURE — 3078F DIAST BP <80 MM HG: CPT | Performed by: FAMILY MEDICINE

## 2018-09-17 PROCEDURE — 3725F SCREEN DEPRESSION PERFORMED: CPT | Performed by: FAMILY MEDICINE

## 2018-09-17 PROCEDURE — 99214 OFFICE O/P EST MOD 30 MIN: CPT | Performed by: FAMILY MEDICINE

## 2018-09-17 RX ORDER — SPIRONOLACTONE 25 MG/1
25 TABLET ORAL DAILY
Qty: 30 TABLET | Refills: 0 | Status: SHIPPED | OUTPATIENT
Start: 2018-09-17 | End: 2019-01-30 | Stop reason: SDUPTHER

## 2018-09-17 RX ORDER — LOSARTAN POTASSIUM 50 MG/1
50 TABLET ORAL DAILY
Qty: 30 TABLET | Refills: 5 | Status: SHIPPED | OUTPATIENT
Start: 2018-09-17 | End: 2019-06-04 | Stop reason: SDUPTHER

## 2018-09-17 RX ORDER — TORSEMIDE 20 MG/1
20 TABLET ORAL 2 TIMES DAILY
Qty: 60 TABLET | Refills: 0 | Status: SHIPPED | OUTPATIENT
Start: 2018-09-17 | End: 2018-10-19 | Stop reason: SDUPTHER

## 2018-09-17 RX ORDER — ALBUTEROL SULFATE 90 UG/1
2 AEROSOL, METERED RESPIRATORY (INHALATION) EVERY 6 HOURS PRN
Qty: 1 INHALER | Refills: 0 | Status: SHIPPED | OUTPATIENT
Start: 2018-09-17 | End: 2019-04-26 | Stop reason: SDUPTHER

## 2018-09-17 RX ORDER — FLUTICASONE FUROATE AND VILANTEROL 100; 25 UG/1; UG/1
1 POWDER RESPIRATORY (INHALATION) DAILY
Qty: 1 INHALER | Refills: 0 | Status: SHIPPED | OUTPATIENT
Start: 2018-09-17 | End: 2019-04-26 | Stop reason: SDUPTHER

## 2018-09-17 NOTE — PROGRESS NOTES
Assessment/Plan:    Di[facility] mellitus type 2, [facility]  Lab Results   Component Value Date    HGBA1C 11 6 (H) 03/25/2018     Can't afford pen insulins-will do generic short acting 35 units qid  No results for input(s): POCGLU in the last 72 hours  Blood Sugar Average: Last 72 hrs:         Diagnoses and all orders for this visit:    Uncontrolled type 2 diabetes mellitus with diabetic polyneuropathy, with long-term current use of insulin (HCC)  -     Microalbumin / creatinine urine ratio; Future  -     Insulin syringes  -     insulin regular (NOVOLIN R RELION) 100 units/mL injection; Inject 0 35 mL (35 Units total) under the skin 4 (four) times a day    Need for influenza vaccination  -     influenza vaccine, [id number], high-dose, PF 0 5 mL, for patients 65 yr+ (FLUZONE HIGH-DOSE)          Subjective:      Patient ID: Trisha Fernandez is a 79 y o  male  Diabetes   He presents for his follow-up diabetic visit  He has type 2 diabetes mellitus  No MedicAlert identification noted  His disease course has been stable  There are no hypoglycemic associated symptoms  Pertinent negatives for hypoglycemia include no headaches  Associated symptoms include foot ulcerations  Pertinent negatives for diabetes include no chest pain, no fatigue and no weight loss  Symptoms are worsening  Diabetic complications include peripheral neuropathy  Risk factors for coronary artery disease include diabetes mellitus, dyslipidemia, male sex, obesity and hypertension  Current diabetic treatment includes insulin injections  He is compliant with treatment some of the time  His weight is increasing steadily  He is following a generally unhealthy diet  Meal planning includes avoidance of concentrated sweets  He has not had a previous visit with a dietitian  His breakfast blood glucose range is generally >200 mg/dl  His lunch blood glucose range is generally >200 mg/dl  His dinner blood glucose range is generally >200 mg/dl   His bedtime blood glucose range is generally >200 mg/dl  His overall blood glucose range is >200 mg/dl  The following portions of the patient's history were reviewed and updated as appropriate: allergies, current medications, past family history, past medical history, past social history, past surgical history and problem list       Review of Systems   Constitutional: Positive for unexpected weight change  Negative for activity change, appetite change, fatigue and weight loss  4 lb   Respiratory: Negative for shortness of breath  Cardiovascular: Positive for leg swelling  Negative for chest pain  Skin: Positive for wound  Neurological: Negative for headaches  Objective:      /78 (BP Location: Left arm, Patient Position: Sitting, Cuff Size: Adult)   Pulse 74   Resp 20   Ht 6' 3" (1 905 m)   Wt (!) 162 kg (358 lb)   SpO2 94%   BMI 44 75 kg/m²          Physical Exam   Constitutional: He appears well-developed and well-nourished  Neck: No thyromegaly present  Cardiovascular: Normal rate, regular rhythm and normal heart sounds  Pulmonary/Chest: Breath sounds normal    Musculoskeletal: He exhibits edema  Lymphadenopathy:     He has no cervical adenopathy  Skin:   Chronic non healing wound   Vitals reviewed

## 2018-09-17 NOTE — TELEPHONE ENCOUNTER
Joaquin Combs from [facility] states he is discharging patient on Wednesday because he is no longer home bound  He states he went to [facility] twice over the weekend an hour away, which means he is not home bound per krishna

## 2018-09-17 NOTE — ASSESSMENT & PLAN NOTE
Lab Results   Component Value Date    HGBA1C 11 6 (H) 03/25/2018     Can't afford pen insulins-will do generic short acting 35 units qid  No results for input(s): POCGLU in the last 72 hours      Blood Sugar Average: Last 72 hrs:

## 2018-09-19 ENCOUNTER — TELEPHONE (OUTPATIENT)
Dept: FAMILY MEDICINE CLINIC | Facility: CLINIC | Age: 68
End: 2018-09-19

## 2018-09-20 ENCOUNTER — APPOINTMENT (OUTPATIENT)
Dept: WOUND CARE | Facility: HOSPITAL | Age: 68
End: 2018-09-20
Payer: COMMERCIAL

## 2018-09-20 PROCEDURE — 11042 DBRDMT SUBQ TIS 1ST 20SQCM/<: CPT

## 2018-09-30 DIAGNOSIS — I10 ESSENTIAL HYPERTENSION: ICD-10-CM

## 2018-10-04 ENCOUNTER — APPOINTMENT (OUTPATIENT)
Dept: WOUND CARE | Facility: HOSPITAL | Age: 68
End: 2018-10-04
Payer: COMMERCIAL

## 2018-10-04 PROCEDURE — 11042 DBRDMT SUBQ TIS 1ST 20SQCM/<: CPT

## 2018-10-11 ENCOUNTER — APPOINTMENT (OUTPATIENT)
Dept: WOUND CARE | Facility: HOSPITAL | Age: 68
End: 2018-10-11
Payer: COMMERCIAL

## 2018-10-11 PROCEDURE — 11045 DBRDMT SUBQ TISS EACH ADDL: CPT

## 2018-10-11 PROCEDURE — 11042 DBRDMT SUBQ TIS 1ST 20SQCM/<: CPT

## 2018-10-18 ENCOUNTER — APPOINTMENT (OUTPATIENT)
Dept: WOUND CARE | Facility: HOSPITAL | Age: 68
End: 2018-10-18
Payer: COMMERCIAL

## 2018-10-18 PROCEDURE — 11042 DBRDMT SUBQ TIS 1ST 20SQCM/<: CPT

## 2018-10-19 DIAGNOSIS — R60.0 LOCALIZED EDEMA: ICD-10-CM

## 2018-10-19 RX ORDER — TORSEMIDE 20 MG/1
TABLET ORAL
Qty: 60 TABLET | Refills: 2 | Status: SHIPPED | OUTPATIENT
Start: 2018-10-19 | End: 2019-08-19

## 2018-10-25 ENCOUNTER — APPOINTMENT (OUTPATIENT)
Dept: WOUND CARE | Facility: HOSPITAL | Age: 68
End: 2018-10-25
Payer: COMMERCIAL

## 2018-10-25 PROCEDURE — 11042 DBRDMT SUBQ TIS 1ST 20SQCM/<: CPT

## 2018-11-01 ENCOUNTER — APPOINTMENT (OUTPATIENT)
Dept: WOUND CARE | Facility: HOSPITAL | Age: 68
End: 2018-11-01
Payer: COMMERCIAL

## 2018-11-01 PROCEDURE — 99213 OFFICE O/P EST LOW 20 MIN: CPT

## 2018-11-06 NOTE — PROGRESS NOTES
Cardiology Follow Up    Yoel Jackson [facility]  1950  [id number]  [address] [facility]  [address] [place] [address] [address] [place] [address]  [phone]    1  Essential hypertension     2  Pure hypercholesterolemia     3  Atrial fibrillation, unspecified type ([facility] )         Interval History:   Patient is here for a follow-up visit  He was most recently seen by me in February  He has a history of chronic atrial fibrillation but has been reluctant and noncompliant with anticoagulant therapy  He had previously been on Coumadin but discontinued this  His most recent echocardiogram done March 2016 demonstrated preserved LV systolic function with concentric left ventricular hypertrophy and mild to moderate left atrial cavity enlargement  There was no significant valvular heart disease  He has been feeling well  He has had no chest pain or significant dyspnea  His EKG demonstrates atrial fibrillation with a controlled ventricular response      Patient Active Problem List   Diagnosis    Diabetes mellitus type 2, uncontrolled ([facility] [address] )    Gout    Chronic venous hypertension w ulceration, bilateral ([facility])    Hypertension    Chronic atrial fibrillation ([facility])    Morbid obesity ([facility])    Chronic diastolic congestive heart failure (HCC)    Cardiomyopathy ([facility])    Diabetes, polyneuropathy ([address] )    GERD (gastroesophageal reflux disease)    Hyperlipidemia    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, right ([facility])    Varicose veins of lower extremity with ulcer, left ([address] )    Onychomycosis    Gallstones    Blood alkaline phosphatase increased compared with prior measurement    Chronic heel ulcer, left, with fat layer exposed ([address] )    Localized edema     Past Medical History:   Diagnosis Date    CHF (congestive heart failure) ([facility])     COPD (chronic obstructive pulmonary disease) ([facility])     Decubitus ulcer of heel     LAST ASSESSED [id number]    Diabetes mellitus ([facility] [address] )     History of varicose veins     Hypertension     Intermittent claudication (HCC)     Neuropathy     Seasonal allergies      Social History     Social History    Marital status:      Spouse name: N/A    Number of children: 1    Years of education: N/A     Occupational History    RETIRED      Social History Main Topics    Smoking status: Never Smoker    Smokeless tobacco: Never Used    Alcohol use Yes      Comment: RARE    Drug use: No    Sexual activity: No     Other Topics Concern    Not on file     Social History Narrative    DENIED [address]    FEELS SAFE AT HOME    LIVES WITH FAMILY - SISTER    NO LIVING WILL    POA IN EXISTENCE     SUPPORTIVE AND SAFE    One son, 2 granddaughters          Family History   Problem Relation Age of Onset    Other Mother         CARDIAC DISORDER     Diabetes Mother     Cancer Father     Other Family         CARDIAC DISORDER     Diabetes Family     Cancer Family     Mental illness Family      Past Surgical History:   Procedure Laterality Date    ANGIOPLASTY      W/ FLUOROSC ANGIOGRAPGY PERIPHERAL ARTERY ADDITIONAL  LAST ASSESSED [id number]    ANGIOPLASTY / STENTING FEMORAL      TANSCATH INTRAVASCULAR STENT PLACEMENT PERCUTANEOUS FEMORAL     FULL THICKNESS SKIN GRAFT      TENDON REPAIR         Current Outpatient Prescriptions:     albuterol (PROVENTIL HFA,VENTOLIN HFA) 90 mcg/act inhaler, Inhale 2 puffs every 6 (six) hours as needed for wheezing, Disp: 1 Inhaler, Rfl: 0    allopurinol (ZYLOPRIM) 300 mg tablet, TAKE 1 TABLET BY MOUTH DAILY, Disp: 90 tablet, Rfl: 1    aspirin 81 mg chewable tablet, Chew 1 tablet (81 mg total) daily, Disp: , Rfl: 0    fluticasone-vilanterol (BREO ELLIPTA) 100-25 mcg/inh inhaler, Inhale 1 puff daily, Disp: 1 Inhaler, Rfl: 0    insulin regular (NOVOLIN R RELION) 100 units/mL injection, Inject 0 35 mL (35 Units total) under the skin 4 (four) times a day, Disp: 50 mL, Rfl: 0    losartan (COZAAR) 50 mg tablet, Take 1 tablet (50 mg total) by mouth daily, Disp: 30 tablet, Rfl: 5    metFORMIN (GLUCOPHAGE) 1000 MG tablet, Take 1 tablet (1,000 mg total) by mouth 2 (two) times a day with meals, Disp: 120 tablet, Rfl: 0    metoprolol tartrate (LOPRESSOR) 25 mg tablet, TAKE 1 TABLET BY MOUTH EVERY 12 HOURS, Disp: 180 tablet, Rfl: 3    Nutritional Supplements (PROSOURCE NO CARB) LIQD, Take 1 oz by mouth 2 (two) times a day, Disp: 1 Bottle, Rfl: 2    spironolactone (ALDACTONE) 25 mg tablet, Take 1 tablet (25 mg total) by mouth daily, Disp: 30 tablet, Rfl: 0    torsemide (DEMADEX) 20 mg tablet, TAKE 1 TABLET BY MOUTH TWICE A DAY, Disp: 60 tablet, Rfl: 2  Allergies   Allergen Reactions    Latex Rash       Labs:not applicable  Imaging: No results found  Review of Systems:  Review of Systems   All other systems reviewed and are negative  Physical Exam:  There were no vitals taken for this visit  Physical Exam   Constitutional: He is oriented to person, place, and time  He appears well-developed and well-nourished  HENT:   Head: Normocephalic and atraumatic  Eyes: Pupils are equal, round, and reactive to light  Conjunctivae are normal    Neck: Normal range of motion  Neck supple  Cardiovascular: Normal rate and normal heart sounds  Lower extremity edema   Pulmonary/Chest: Effort normal and breath sounds normal    Neurological: He is alert and oriented to person, place, and time  Skin: Skin is warm and dry  Psychiatric: He has a normal mood and affect  Vitals reviewed  Discussion/Summary:I will continue the patient's present medical regimen  The patient appears well compensated  I have asked the patient to call if there is a problem in the interim otherwise I will see the patient in six months time

## 2018-11-07 ENCOUNTER — OFFICE VISIT (OUTPATIENT)
Dept: CARDIOLOGY CLINIC | Facility: CLINIC | Age: 68
End: 2018-11-07
Payer: COMMERCIAL

## 2018-11-07 VITALS
HEART RATE: 64 BPM | SYSTOLIC BLOOD PRESSURE: 124 MMHG | HEIGHT: 75 IN | WEIGHT: 315 LBS | DIASTOLIC BLOOD PRESSURE: 64 MMHG | BODY MASS INDEX: 39.17 KG/M2

## 2018-11-07 DIAGNOSIS — I10 ESSENTIAL HYPERTENSION: Primary | ICD-10-CM

## 2018-11-07 DIAGNOSIS — E78.00 PURE HYPERCHOLESTEROLEMIA: ICD-10-CM

## 2018-11-07 DIAGNOSIS — I48.91 ATRIAL FIBRILLATION, UNSPECIFIED TYPE (HCC): ICD-10-CM

## 2018-11-07 PROCEDURE — 99214 OFFICE O/P EST MOD 30 MIN: CPT | Performed by: INTERNAL MEDICINE

## 2018-11-07 PROCEDURE — 4040F PNEUMOC VAC/ADMIN/RCVD: CPT | Performed by: INTERNAL MEDICINE

## 2018-11-07 PROCEDURE — 93000 ELECTROCARDIOGRAM COMPLETE: CPT | Performed by: INTERNAL MEDICINE

## 2018-11-07 RX ORDER — INSULIN DETEMIR 100 [IU]/ML
INJECTION, SOLUTION SUBCUTANEOUS
Refills: 3 | COMMUNITY
Start: 2018-10-23 | End: 2018-12-29 | Stop reason: HOSPADM

## 2018-11-08 ENCOUNTER — APPOINTMENT (OUTPATIENT)
Dept: WOUND CARE | Facility: HOSPITAL | Age: 68
End: 2018-11-08
Payer: COMMERCIAL

## 2018-11-08 PROCEDURE — 11042 DBRDMT SUBQ TIS 1ST 20SQCM/<: CPT

## 2018-11-29 ENCOUNTER — APPOINTMENT (OUTPATIENT)
Dept: WOUND CARE | Facility: HOSPITAL | Age: 68
End: 2018-11-29
Payer: COMMERCIAL

## 2018-11-29 PROCEDURE — 11042 DBRDMT SUBQ TIS 1ST 20SQCM/<: CPT

## 2018-12-25 ENCOUNTER — HOSPITAL ENCOUNTER (INPATIENT)
Facility: HOSPITAL | Age: 68
LOS: 4 days | Discharge: HOME HEALTH | DRG: 617 | End: 2018-12-29
Attending: EMERGENCY MEDICINE | Admitting: INTERNAL MEDICINE
Payer: COMMERCIAL

## 2018-12-25 ENCOUNTER — APPOINTMENT (EMERGENCY)
Dept: RADIOLOGY | Facility: HOSPITAL | Age: 68
DRG: 617 | End: 2018-12-25
Payer: COMMERCIAL

## 2018-12-25 DIAGNOSIS — E11.621 DIABETIC FOOT ULCER (HCC): ICD-10-CM

## 2018-12-25 DIAGNOSIS — L97.509 DIABETIC FOOT ULCER (HCC): ICD-10-CM

## 2018-12-25 DIAGNOSIS — E11.621 DIABETIC ULCER OF TOE OF LEFT FOOT ASSOCIATED WITH TYPE 2 DIABETES MELLITUS, LIMITED TO BREAKDOWN OF SKIN (HCC): Primary | ICD-10-CM

## 2018-12-25 DIAGNOSIS — M79.89 SWELLING OF LIMB: ICD-10-CM

## 2018-12-25 DIAGNOSIS — IMO0001 CHRONIC VENOUS HYPERTENSION W ULCERATION, BILATERAL: ICD-10-CM

## 2018-12-25 DIAGNOSIS — L97.521 DIABETIC ULCER OF TOE OF LEFT FOOT ASSOCIATED WITH TYPE 2 DIABETES MELLITUS, LIMITED TO BREAKDOWN OF SKIN (HCC): Primary | ICD-10-CM

## 2018-12-25 DIAGNOSIS — IMO0002 DIABETES MELLITUS TYPE 2, UNCONTROLLED: ICD-10-CM

## 2018-12-25 DIAGNOSIS — I70.25 ATHEROSCLEROSIS OF NATIVE ARTERIES OF THE EXTREMITIES WITH ULCERATION (HCC): ICD-10-CM

## 2018-12-25 DIAGNOSIS — I48.20 CHRONIC ATRIAL FIBRILLATION (HCC): ICD-10-CM

## 2018-12-25 LAB
ALBUMIN SERPL BCP-MCNC: 2.9 G/DL (ref 3.5–5)
ALP SERPL-CCNC: 167 U/L (ref 46–116)
ALT SERPL W P-5'-P-CCNC: 26 U/L (ref 12–78)
ANION GAP SERPL CALCULATED.3IONS-SCNC: 11 MMOL/L (ref 4–13)
AST SERPL W P-5'-P-CCNC: 26 U/L (ref 5–45)
BASOPHILS # BLD AUTO: 0.03 THOUSANDS/ΜL (ref 0–0.1)
BASOPHILS NFR BLD AUTO: 0 % (ref 0–1)
BILIRUB SERPL-MCNC: 0.6 MG/DL (ref 0.2–1)
BUN SERPL-MCNC: 37 MG/DL (ref 5–25)
CALCIUM SERPL-MCNC: 8.6 MG/DL (ref 8.3–10.1)
CHLORIDE SERPL-SCNC: 101 MMOL/L (ref 100–108)
CO2 SERPL-SCNC: 26 MMOL/L (ref 21–32)
CREAT SERPL-MCNC: 1.19 MG/DL (ref 0.6–1.3)
CRP SERPL QL: 76.4 MG/L
EOSINOPHIL # BLD AUTO: 0.21 THOUSAND/ΜL (ref 0–0.61)
EOSINOPHIL NFR BLD AUTO: 3 % (ref 0–6)
ERYTHROCYTE [DISTWIDTH] IN BLOOD BY AUTOMATED COUNT: 13.7 % (ref 11.6–15.1)
ERYTHROCYTE [SEDIMENTATION RATE] IN BLOOD: 63 MM/HOUR (ref 0–10)
GFR SERPL CREATININE-BSD FRML MDRD: 63 ML/MIN/1.73SQ M
GLUCOSE SERPL-MCNC: 176 MG/DL (ref 65–140)
GLUCOSE SERPL-MCNC: 181 MG/DL (ref 65–140)
GLUCOSE SERPL-MCNC: 190 MG/DL (ref 65–140)
HCT VFR BLD AUTO: 36.5 % (ref 36.5–49.3)
HGB BLD-MCNC: 11.5 G/DL (ref 12–17)
IMM GRANULOCYTES # BLD AUTO: 0.04 THOUSAND/UL (ref 0–0.2)
IMM GRANULOCYTES NFR BLD AUTO: 1 % (ref 0–2)
LACTATE SERPL-SCNC: 1.7 MMOL/L (ref 0.5–2)
LYMPHOCYTES # BLD AUTO: 1.34 THOUSANDS/ΜL (ref 0.6–4.47)
LYMPHOCYTES NFR BLD AUTO: 18 % (ref 14–44)
MCH RBC QN AUTO: 29 PG (ref 26.8–34.3)
MCHC RBC AUTO-ENTMCNC: 31.5 G/DL (ref 31.4–37.4)
MCV RBC AUTO: 92 FL (ref 82–98)
MONOCYTES # BLD AUTO: 0.73 THOUSAND/ΜL (ref 0.17–1.22)
MONOCYTES NFR BLD AUTO: 10 % (ref 4–12)
NEUTROPHILS # BLD AUTO: 5.13 THOUSANDS/ΜL (ref 1.85–7.62)
NEUTS SEG NFR BLD AUTO: 68 % (ref 43–75)
NRBC BLD AUTO-RTO: 0 /100 WBCS
PLATELET # BLD AUTO: 196 THOUSANDS/UL (ref 149–390)
PMV BLD AUTO: 9.8 FL (ref 8.9–12.7)
POTASSIUM SERPL-SCNC: 4.5 MMOL/L (ref 3.5–5.3)
PROT SERPL-MCNC: 7.5 G/DL (ref 6.4–8.2)
RBC # BLD AUTO: 3.97 MILLION/UL (ref 3.88–5.62)
SODIUM SERPL-SCNC: 138 MMOL/L (ref 136–145)
WBC # BLD AUTO: 7.48 THOUSAND/UL (ref 4.31–10.16)

## 2018-12-25 PROCEDURE — 73630 X-RAY EXAM OF FOOT: CPT

## 2018-12-25 PROCEDURE — 36415 COLL VENOUS BLD VENIPUNCTURE: CPT | Performed by: PHYSICIAN ASSISTANT

## 2018-12-25 PROCEDURE — 85652 RBC SED RATE AUTOMATED: CPT | Performed by: PHYSICIAN ASSISTANT

## 2018-12-25 PROCEDURE — 87147 CULTURE TYPE IMMUNOLOGIC: CPT | Performed by: PHYSICIAN ASSISTANT

## 2018-12-25 PROCEDURE — 96365 THER/PROPH/DIAG IV INF INIT: CPT

## 2018-12-25 PROCEDURE — 85025 COMPLETE CBC W/AUTO DIFF WBC: CPT | Performed by: PHYSICIAN ASSISTANT

## 2018-12-25 PROCEDURE — 99284 EMERGENCY DEPT VISIT MOD MDM: CPT

## 2018-12-25 PROCEDURE — 99223 1ST HOSP IP/OBS HIGH 75: CPT | Performed by: INTERNAL MEDICINE

## 2018-12-25 PROCEDURE — 93005 ELECTROCARDIOGRAM TRACING: CPT

## 2018-12-25 PROCEDURE — 87205 SMEAR GRAM STAIN: CPT | Performed by: PHYSICIAN ASSISTANT

## 2018-12-25 PROCEDURE — 86140 C-REACTIVE PROTEIN: CPT | Performed by: PHYSICIAN ASSISTANT

## 2018-12-25 PROCEDURE — 87186 SC STD MICRODIL/AGAR DIL: CPT | Performed by: PHYSICIAN ASSISTANT

## 2018-12-25 PROCEDURE — 83605 ASSAY OF LACTIC ACID: CPT | Performed by: PHYSICIAN ASSISTANT

## 2018-12-25 PROCEDURE — 96361 HYDRATE IV INFUSION ADD-ON: CPT

## 2018-12-25 PROCEDURE — 87070 CULTURE OTHR SPECIMN AEROBIC: CPT | Performed by: PHYSICIAN ASSISTANT

## 2018-12-25 PROCEDURE — 80053 COMPREHEN METABOLIC PANEL: CPT | Performed by: PHYSICIAN ASSISTANT

## 2018-12-25 PROCEDURE — 87040 BLOOD CULTURE FOR BACTERIA: CPT | Performed by: PHYSICIAN ASSISTANT

## 2018-12-25 PROCEDURE — 82948 REAGENT STRIP/BLOOD GLUCOSE: CPT

## 2018-12-25 RX ORDER — ALLOPURINOL 300 MG/1
300 TABLET ORAL DAILY
Status: DISCONTINUED | OUTPATIENT
Start: 2018-12-26 | End: 2018-12-29 | Stop reason: HOSPADM

## 2018-12-25 RX ORDER — ONDANSETRON 2 MG/ML
4 INJECTION INTRAMUSCULAR; INTRAVENOUS EVERY 6 HOURS PRN
Status: DISCONTINUED | OUTPATIENT
Start: 2018-12-25 | End: 2018-12-29 | Stop reason: HOSPADM

## 2018-12-25 RX ORDER — HEPARIN SODIUM 5000 [USP'U]/ML
5000 INJECTION, SOLUTION INTRAVENOUS; SUBCUTANEOUS EVERY 8 HOURS SCHEDULED
Status: DISCONTINUED | OUTPATIENT
Start: 2018-12-25 | End: 2018-12-29 | Stop reason: HOSPADM

## 2018-12-25 RX ORDER — SODIUM CHLORIDE 9 MG/ML
100 INJECTION, SOLUTION INTRAVENOUS CONTINUOUS
Status: DISCONTINUED | OUTPATIENT
Start: 2018-12-25 | End: 2018-12-26

## 2018-12-25 RX ORDER — ASPIRIN 81 MG/1
81 TABLET, CHEWABLE ORAL DAILY
Status: DISCONTINUED | OUTPATIENT
Start: 2018-12-26 | End: 2018-12-29 | Stop reason: HOSPADM

## 2018-12-25 RX ORDER — TORSEMIDE 20 MG/1
20 TABLET ORAL 2 TIMES DAILY
Status: DISCONTINUED | OUTPATIENT
Start: 2018-12-25 | End: 2018-12-29 | Stop reason: HOSPADM

## 2018-12-25 RX ORDER — ACETAMINOPHEN 325 MG/1
650 TABLET ORAL EVERY 6 HOURS PRN
Status: DISCONTINUED | OUTPATIENT
Start: 2018-12-25 | End: 2018-12-29 | Stop reason: HOSPADM

## 2018-12-25 RX ORDER — ALBUTEROL SULFATE 90 UG/1
2 AEROSOL, METERED RESPIRATORY (INHALATION) EVERY 6 HOURS PRN
Status: DISCONTINUED | OUTPATIENT
Start: 2018-12-25 | End: 2018-12-29 | Stop reason: HOSPADM

## 2018-12-25 RX ORDER — LOSARTAN POTASSIUM 50 MG/1
50 TABLET ORAL DAILY
Status: DISCONTINUED | OUTPATIENT
Start: 2018-12-26 | End: 2018-12-29 | Stop reason: HOSPADM

## 2018-12-25 RX ORDER — FLUTICASONE FUROATE AND VILANTEROL 100; 25 UG/1; UG/1
1 POWDER RESPIRATORY (INHALATION) DAILY
Status: DISCONTINUED | OUTPATIENT
Start: 2018-12-26 | End: 2018-12-29 | Stop reason: HOSPADM

## 2018-12-25 RX ADMIN — INSULIN LISPRO 2 UNITS: 100 INJECTION, SOLUTION INTRAVENOUS; SUBCUTANEOUS at 21:48

## 2018-12-25 RX ADMIN — METOPROLOL TARTRATE 25 MG: 25 TABLET, FILM COATED ORAL at 18:34

## 2018-12-25 RX ADMIN — HEPARIN SODIUM 5000 UNITS: 5000 INJECTION INTRAVENOUS; SUBCUTANEOUS at 21:47

## 2018-12-25 RX ADMIN — TORSEMIDE 20 MG: 20 TABLET ORAL at 18:34

## 2018-12-25 RX ADMIN — METFORMIN HYDROCHLORIDE 1000 MG: 500 TABLET ORAL at 18:34

## 2018-12-25 RX ADMIN — CEFEPIME HYDROCHLORIDE 2000 MG: 2 INJECTION, SOLUTION INTRAVENOUS at 15:29

## 2018-12-25 RX ADMIN — VANCOMYCIN HYDROCHLORIDE 2000 MG: 1 INJECTION, POWDER, LYOPHILIZED, FOR SOLUTION INTRAVENOUS at 16:02

## 2018-12-25 RX ADMIN — SODIUM CHLORIDE 100 ML/HR: 0.9 INJECTION, SOLUTION INTRAVENOUS at 21:47

## 2018-12-25 RX ADMIN — SODIUM CHLORIDE 1000 ML: 0.9 INJECTION, SOLUTION INTRAVENOUS at 14:51

## 2018-12-25 RX ADMIN — INSULIN HUMAN 35 UNITS: 100 INJECTION, SOLUTION PARENTERAL at 18:34

## 2018-12-25 NOTE — H&P
History and Physical  Amauri Cummins 79 y o  male MRN: [id number]  Unit/Bed#: [address] [place] [address] [id number] Encounter: [id number]    Admitting Diagnosis:   Principal Problem:    Diabetic ulcer of toe of left foot associated with type 2 diabetes mellitus, limited to breakdown of skin ([facility] [address] )  Active Problems:    Diabetic foot ulcer ([facility] [address] )    Hypertension    Chronic atrial fibrillation (HCC)    Morbid obesity ([facility] )    Chronic diastolic congestive heart failure (HCC)    GERD (gastroesophageal reflux disease)    Hyperlipidemia  Resolved Problems:    * No resolved hospital problems  *      Plan:  Admit to med surgical floor as inpatient status  · Diabetic foot ulcer/cellulitis  Will continue on cefepime and vancomycin  Elevate left lower extremity  Will order venous duplex of left lower extremity  Podiatry and Infectious Disease input  Follow-up culture results  · Chronic atrial fibrillation, hypertension  Continue on Lopressor, Cozaar and Demadex  Daily weight and I&Os  Patient follows up with Cardiology as outpatient and has been reluctant to be on anticoagulation as per records  Continue on aspirin for now  · Diabetes mellitus  Patient as per records cannot afford Levemir pens  He was switched to Novolin R 35 units subcu 4 times a day  Continue on metformin  · Morbid obesity-encourage weight loss  · DVT prophylaxis  · Discussed with patient in detail  · Anticipated length of stay greater than 2 midnights  Chief Complaint   Patient presents with    Foot Ulcer     Refered to ED by [facility] for evaluation of left foot "infection"  Pt states that he has [facility] for wound care for chronic ulcers  Denies any fever or chills, drainage  Has noted " a bad smell"        HPI:  Yessy Mojica is a 79 y o  male with history of COPD, insulin-dependent diabetes, chronic left heel ulcer, peripheral artery disease presented to the emergency department for evaluation of left 4th toe ulceration    Patient was seen by his visiting home care nurse this morning who noticed purulent and malodorous discharge from the left 4th toe and referred him to ER  Patient has chronic neuropathy and denies any pain  He denies any associated fever, chills, chest pain, shortness of breath, nausea, vomiting or any other complaint  Patient did complain of noticing left leg being more swollen  ER provider spoke with the podiatrist Dr Dick Mayo who recommended patient to be admitted to hospitalist service and they will consult in a m      Historical Information   Past Medical History:   Diagnosis Date    [facility]) [facility])     COPD (chronic obstructive pulmonary disease) ([facility])     Decubitus ulcer of heel     LAST ASSESSED [id number]    Diabetes mellitus ([facility] [address] )     History of varicose veins     Hypertension     Intermittent claudication ([facility])     Neuropathy     Seasonal allergies      Past Surgical History:   Procedure Laterality Date    ANGIOPLASTY      W/ FLUOROSC ANGIOGRAPGY PERIPHERAL ARTERY ADDITIONAL  LAST ASSESSED [id number]    ANGIOPLASTY / STENTING FEMORAL      TANSCATH INTRAVASCULAR STENT PLACEMENT PERCUTANEOUS FEMORAL     FULL THICKNESS SKIN GRAFT      TENDON REPAIR       Social History   History   Alcohol Use    Yes     Comment: RARE     History   Drug Use No     History   Smoking Status    Never Smoker   Smokeless Tobacco    Never Used     Family History   Problem Relation Age of Onset    Other Mother         CARDIAC DISORDER     Diabetes Mother     Cancer Father     Other Family         CARDIAC DISORDER     Diabetes Family     Cancer Family     Mental illness Family        Meds/Allergies   Allergies   Allergen Reactions    Latex Rash       Meds:    Current Facility-Administered Medications:     vancomycin (VANCOCIN) 2,000 mg in sodium chloride 0 9 % 500 mL IVPB, 2,000 mg, Intravenous, Once, Candelario Saldaña PA-C, Last Rate: 250 mL/hr at 12/25/18 1602, 2,000 mg at 12/25/18 7223    Prescriptions Prior to Admission   Medication    albuterol (PROVENTIL HFA,VENTOLIN HFA) 90 mcg/act inhaler    allopurinol (ZYLOPRIM) 300 mg tablet    aspirin 81 mg chewable tablet    fluticasone-vilanterol (BREO ELLIPTA) 100-25 mcg/inh inhaler    insulin regular (NOVOLIN R RELION) 100 units/mL injection    LEVEMIR FLEXTOUCH 100 units/mL injection pen    losartan (COZAAR) 50 mg tablet    metFORMIN (GLUCOPHAGE) 1000 MG tablet    metoprolol tartrate (LOPRESSOR) 25 mg tablet    Nutritional Supplements (PROSOURCE NO CARB) LIQD    spironolactone (ALDACTONE) 25 mg tablet    torsemide (DEMADEX) 20 mg tablet         Review of Systems   Constitutional: Positive for activity change  HENT: Negative  Eyes: Negative  Respiratory: Negative  Cardiovascular: Negative  Gastrointestinal: Negative  Endocrine: Negative  Genitourinary: Negative  Musculoskeletal: Negative  Skin: Positive for rash and wound  Allergic/Immunologic: Negative  Neurological: Negative  Hematological: Negative  Psychiatric/Behavioral: Negative  Current Vitals:   Blood Pressure: 142/65 (12/25/18 1712)  Pulse: 60 (12/25/18 1712)  Temperature: 98 5 °F (36 9 °C) (12/25/18 1712)  Temp Source: Oral (12/25/18 1712)  Respirations: 18 (12/25/18 1712)  Height: 6' 3" (190 5 cm) (12/25/18 1712)  Weight - Scale: (!) 167 kg (367 lb 6 4 oz) (12/25/18 1712)  SpO2: 93 % (12/25/18 1712)  SPO2 RA Rest      ED to Hosp-Admission (Current) from 12/25/2018 in [facility]   SpO2  93 %   SpO2 Activity  At Rest   O2 Device  None (Room air)   O2 Flow Rate            Intake/Output Summary (Last 24 hours) at 12/25/18 1717  Last data filed at 12/25/18 1602   Gross per 24 hour   Intake               50 ml   Output                0 ml   Net               50 ml     Body mass index is 45 92 kg/m²  Physical Exam   Constitutional: He is oriented to person, place, and time  He appears well-developed and well-nourished   No distress  HENT:   Head: Normocephalic and atraumatic  Mouth/Throat: Oropharynx is clear and moist    Eyes: Pupils are equal, round, and reactive to light  Conjunctivae and EOM are normal  No scleral icterus  Neck: Normal range of motion  Neck supple  No JVD present  Cardiovascular: Normal rate, regular rhythm, normal heart sounds and intact distal pulses  Pulmonary/Chest: Effort normal and breath sounds normal  He has no wheezes  He has no rales  Abdominal: Soft  Bowel sounds are normal  There is no tenderness  There is no rebound and no guarding  Musculoskeletal: Normal range of motion  He exhibits no edema, tenderness or deformity  Neurological: He is alert and oriented to person, place, and time  No cranial nerve deficit  No focal deficits  Skin: Skin is warm  Rash noted  There is erythema  Left foot in a dressing- chronic ulcer in the heel  Left 4th toe on plantar surface with an open ulcer  Psychiatric: His behavior is normal  Judgment normal    Nursing note and vitals reviewed        Lab Results:   CBC:   Lab Results   Component Value Date    WBC 7 48 12/25/2018    HGB 11 5 (L) 12/25/2018    HCT 36 5 12/25/2018    MCV 92 12/25/2018     12/25/2018    MCH 29 0 12/25/2018    MCHC 31 5 12/25/2018    RDW 13 7 12/25/2018    MPV 9 8 12/25/2018    NRBC 0 12/25/2018     CMP:  Lab Results   Component Value Date     01/15/2018     12/25/2018    CL 98 01/23/2018    CO2 26 12/25/2018    CO2 29 01/23/2018    ANIONGAP 11 12/22/2015    BUN 37 (H) 12/25/2018    BUN 27 (H) 01/23/2018    CREATININE 1 19 12/25/2018    CREATININE 0 99 01/15/2018    GLUCOSE 139 12/22/2015    CALCIUM 8 6 12/25/2018    CALCIUM 8 9 01/23/2018    AST 26 12/25/2018    AST 13 01/15/2018    ALT 26 12/25/2018    ALT 13 01/15/2018    ALKPHOS 167 (H) 12/25/2018    ALKPHOS 199 (H) 01/23/2018    PROT 7 1 01/15/2018    BILITOT 0 6 01/15/2018    EGFR 63 12/25/2018     Lab Results   Component Value Date    TROPONINI <0 02 03/23/2018    CKTOTAL 53 03/23/2018     Coagulation:   Lab Results   Component Value Date    INR 1 00 03/23/2018    INR 1 06 07/01/2015    Urinalysis:  Lab Results   Component Value Date    COLORU Yellow 03/23/2018    COLORU Yellow 04/02/2015    CLARITYU Clear 03/23/2018    CLARITYU Slightly Cloudy 04/02/2015    SPECGRAV 1 015 03/23/2018    SPECGRAV >=1 030 04/02/2015    PHUR 5 5 03/23/2018    PHUR 5 5 04/02/2015    LEUKOCYTESUR (A) 03/23/2018     Elevated glucose may cause decreased leukocyte values  See urine microscopic for [facility] result/    LEUKOCYTESUR Negative 04/02/2015    NITRITE Negative 03/23/2018    NITRITE Negative 04/02/2015    PROTEINUA Trace (A) 04/02/2015    GLUCOSEU >=1000 (1%) (A) 03/23/2018    GLUCOSEU Negative 04/02/2015    KETONESU Negative 03/23/2018    KETONESU Negative 04/02/2015    BILIRUBINUR Negative 03/23/2018    BILIRUBINUR Small (A) 04/02/2015    BLOODU Negative 03/23/2018    BLOODU Negative 04/02/2015      Amylase: No results found for: AMYLASE  Lipase: No results found for: LIPASE     Imaging: Xr Foot 3+ Views Left    Result Date: 12/25/2018  Narrative: LEFT FOOT INDICATION:   4th toe ulcer/infection  COMPARISON:  March 23, 2018  VIEWS:  XR FOOT 3+ VW LEFT FINDINGS: There is no acute fracture or dislocation  No destructive osseous lesion is seen  Small calcaneal spurs  Moderate degenerative changes in the intertarsal and tarsometatarsal articulations  The findings may be related to Charcot's arthropathy  Moderate degenerative changes at the first MTP  No lytic or blastic lesions seen  Moderate soft tissue swelling about the dorsum of the foot  Impression: 1  No radiographic findings of osteomyelitis  If there is persistent clinical concern, further evaluation with MRI would be beneficial  2  Moderate diffuse soft tissue swelling about the dorsum of the foot   3  Moderate Lisfranc osteoarthritis, unchanged from the prior exam  This in part may be related to Charcot's arthropathy  Workstation performed: [id number]     EKG, Pathology, and Other Studies: I have personally reviewed the results  VTE Pharmacologic Prophylaxis: Heparin  VTE Mechanical Prophylaxis: sequential compression device    Code Status: Level 1 - Full Code    Counseling / Coordination of Care  Total floor / unit time spent today 72 minutes  Greater than 50% of total time was spent with the patient and / or family counseling and / or coordination of care       "This note has been constructed using a voice recognition system"      Denise Archer MD  12/25/2018, 5:17 PM

## 2018-12-25 NOTE — ED PROVIDER NOTES
History  Chief Complaint   Patient presents with    Foot Ulcer     Refered to ED by [facility] for evaluation of left foot "infection"  Pt states that he has [facility] for wound care for chronic ulcers  Denies any fever or chills, drainage  Has noted " a bad smell"     68-year-old male with history of CHF, COPD, insulin-dependent diabetes, chronic left heel decubitus ulcer, and peripheral arterial disease presents emergency department for evaluation of left 4th toe ulceration  He was seen by home care nurse this morning was concerned for purulent discharge and malodor, and was sent into the emergency department  Patient has chronic neuropathy and has no pain to the site  He denies associated fever, chills, sweats, chest pain, shortness of breath  He does not feel that his leg has been more swollen than normal   He has no 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  States that his CBG this AM was 101, but typically "runs high"            Prior to Admission Medications   Prescriptions Last Dose Informant Patient Reported? Taking?    LEVEMIR FLEXTOUCH 100 units/mL injection pen   Yes No   Sig: INJECT 50 UNIT TWICE A DAY   Nutritional Supplements (PROSOURCE NO CARB) LIQD   No No   Sig: Take 1 oz by mouth 2 (two) times a day   albuterol (PROVENTIL HFA,VENTOLIN HFA) 90 mcg/act inhaler   No No   Sig: Inhale 2 puffs every 6 (six) hours as needed for wheezing   allopurinol (ZYLOPRIM) 300 mg tablet   No No   Sig: TAKE 1 TABLET BY MOUTH DAILY   aspirin 81 mg chewable tablet   No No   Sig: Chew 1 tablet (81 mg total) daily   fluticasone-vilanterol (BREO ELLIPTA) 100-25 mcg/inh inhaler   No No   Sig: Inhale 1 puff daily   insulin regular (NOVOLIN R RELION) 100 units/mL injection   No No   Sig: Inject 0 35 mL (35 Units total) under the skin 4 (four) times a day   losartan (COZAAR) 50 mg tablet   No No   Sig: Take 1 tablet (50 mg total) by mouth daily   metFORMIN (GLUCOPHAGE) 1000 MG tablet   No No   Sig: Take 1 tablet (1,000 mg total) by mouth 2 (two) times a day with meals   metoprolol tartrate (LOPRESSOR) 25 mg tablet   No No   Sig: TAKE 1 TABLET BY MOUTH EVERY 12 HOURS   spironolactone (ALDACTONE) 25 mg tablet   No No   Sig: Take 1 tablet (25 mg total) by mouth daily   torsemide (DEMADEX) 20 mg tablet   No No   Sig: TAKE 1 TABLET BY MOUTH TWICE A DAY      Facility-Administered Medications: None       Past Medical History:   Diagnosis Date    [facility]est[facility] failure[facility])     COPD (chronic obstructive pulmonary disease) ([facility]     Decubitus ulcer of heel     LAST ASSESSED [id number]    Diabetes mellitus ([facility] [address] )     History of varicose veins     Hypertension     Intermittent claudication ([facility])     Neuropathy     Seasonal allergies        Past Surgical History:   Procedure Laterality Date    ANGIOPLASTY      W/ FLUOROSC ANGIOGRAPGY PERIPHERAL ARTERY ADDITIONAL  LAST ASSESSED [id number]    ANGIOPLASTY / STENTING FEMORAL      TANSCATH INTRAVASCULAR STENT PLACEMENT PERCUTANEOUS FEMORAL     FULL THICKNESS SKIN GRAFT      TENDON REPAIR         Family History   Problem Relation Age of Onset    Other Mother         CARDIAC DISORDER     Diabetes Mother     Cancer Father     Other Family         CARDIAC DISORDER     Diabetes Family     Cancer Family     Mental illness Family      I have reviewed and agree with the history as documented  Social History   Substance Use Topics    Smoking status: Never Smoker    Smokeless tobacco: Never Used    Alcohol use Yes      Comment: RARE        Review of Systems   Constitutional: Negative for chills, diaphoresis and fever  Eyes: Negative for visual disturbance  Respiratory: Negative for chest tightness and shortness of breath  Cardiovascular: Negative for chest pain  Gastrointestinal: Negative for abdominal pain, nausea and vomiting  Genitourinary: Negative for dysuria and hematuria  Musculoskeletal: Negative for arthralgias, gait problem and myalgias  Skin: Positive for color change and wound  Allergic/Immunologic: Positive for immunocompromised state (IDDM poorly controlled)  Neurological: Positive for numbness (chronic, baseline)  Psychiatric/Behavioral: Negative for confusion  Physical Exam  Physical Exam   Constitutional: He is oriented to person, place, and time  He appears well-developed and well-nourished  No distress  Morbidly obese   HENT:   Head: Normocephalic and atraumatic  Mouth/Throat: Oropharynx is clear and moist    Eyes: Pupils are equal, round, and reactive to light  No scleral icterus  Neck: No JVD present  Cardiovascular: Normal rate and regular rhythm  Exam reveals no gallop and no friction rub  No murmur heard  Pulmonary/Chest: No respiratory distress  He has no wheezes  He has no rales  Abdominal: Soft  Bowel sounds are normal  There is no tenderness  Musculoskeletal: He exhibits edema (marked BLE edema to the knees, reportedly chronic)  Neurological: He is alert and oriented to person, place, and time  Loss of sensation to pinprick of bilat toes   Skin: Skin is warm and dry  Capillary refill takes less than 2 seconds  He is not diaphoretic  See clinical images  Ulcer of L 4th toe approx 2-3mm depth, no tracking   Psychiatric: He has a normal mood and affect  His behavior is normal    Vitals reviewed              Vital Signs  ED Triage Vitals [12/25/18 1411]   Temperature Pulse Respirations Blood Pressure SpO2   98 1 °F (36 7 °C) 78 20 145/84 98 %      Temp Source Heart Rate Source Patient Position - Orthostatic VS BP Location FiO2 (%)   Tympanic Monitor Sitting Right arm --      Pain Score       No Pain           Vitals:    12/25/18 1411   BP: 145/84   Pulse: 78   Patient Position - Orthostatic VS: Sitting       Visual Acuity      ED Medications  Medications   cefepime (MAXIPIME) 2 g/50 mL dextrose IVPB (2,000 mg Intravenous New Bag 12/25/18 1529)   vancomycin (VANCOCIN) 2,000 mg in sodium chloride 0 9 % 500 mL IVPB (not administered)   sodium chloride 0 9 % bolus 1,000 mL (1,000 mL Intravenous New Bag 12/25/18 1451)       Diagnostic Studies  Results Reviewed     Procedure Component Value Units Date/Time    Sedimentation rate, automated [[id number]]     Lab Status:  No result Specimen:  Blood     C-reactive protein [[id number]]     Lab Status:  No result Specimen:  Blood     Comprehensive metabolic panel [[id number]]  (Abnormal) Collected:  12/25/18 1441    Lab Status:  Final result Specimen:  Blood from Arm, Right Updated:  12/25/18 1525     Sodium 138 mmol/L      Potassium 4 5 mmol/L      Chloride 101 mmol/L      CO2 26 mmol/L      ANION GAP 11 mmol/L      BUN 37 (H) mg/dL      Creatinine 1 19 mg/dL      Glucose 190 (H) mg/dL      Calcium 8 6 mg/dL      AST 26 U/L      ALT 26 U/L      Alkaline Phosphatase 167 (H) U/L      Total Protein 7 5 g/dL      Albumin 2 9 (L) g/dL      Total Bilirubin 0 60 mg/dL      eGFR 63 ml/min/1 73sq m     Narrative:         National Kidney Disease Education Program recommendations are as follows:  GFR calculation is accurate only with a steady state creatinine  Chronic Kidney disease less than 60 ml/min/1 73 sq  meters  Kidney failure less than 15 ml/min/1 73 sq  meters  Blood culture #1 [[id number]] Collected:  12/25/18 1509    Lab Status: In process Specimen:  Blood from Arm, Left Updated:  12/25/18 1516    Lactic acid, plasma [[id number]]  (Normal) Collected:  12/25/18 1441    Lab Status:  Final result Specimen:  Blood from Arm, Right Updated:  12/25/18 1512     LACTIC ACID 1 7 mmol/L     Narrative:         Result may be elevated if tourniquet was used during collection  Wound culture and Gram stain [[id number]] Collected:  12/25/18 1446    Lab Status:   In process Specimen:  Wound from Toe, Left Updated:  12/25/18 1457    CBC and differential [[id number]]  (Abnormal) Collected:  12/25/18 1441    Lab Status:  Final result Specimen:  Blood from Arm, Right Updated:  12/25/18 1452 WBC 7 48 Thousand/uL      RBC 3 97 Million/uL      Hemoglobin 11 5 (L) g/dL      Hematocrit 36 5 %      MCV 92 fL      MCH 29 0 pg      MCHC 31 5 g/dL      RDW 13 7 %      MPV 9 8 fL      Platelets 820 Thousands/uL      nRBC 0 /100 WBCs      Neutrophils Relative 68 %      Immat GRANS % 1 %      Lymphocytes Relative 18 %      Monocytes Relative 10 %      Eosinophils Relative 3 %      Basophils Relative 0 %      Neutrophils Absolute 5 13 Thousands/µL      Immature Grans Absolute 0 04 Thousand/uL      Lymphocytes Absolute 1 34 Thousands/µL      Monocytes Absolute 0 73 Thousand/µL      Eosinophils Absolute 0 21 Thousand/µL      Basophils Absolute 0 03 Thousands/µL     Blood culture #2 [[id number]] Collected:  12/25/18 1441    Lab Status: In process Specimen:  Blood from Arm, Right Updated:  12/25/18 1449                 XR foot 3+ views LEFT   ED Interpretation by Hasmukh Pedraza PA-C (12/25 6803)   No obvious lytic reaction to 4th digit      Final Result by Avtar Luna MD (12/25 1925)         1  No radiographic findings of osteomyelitis  If there is persistent clinical concern, further evaluation with MRI would be beneficial       2  Moderate diffuse soft tissue swelling about the dorsum of the foot  3  Moderate Lisfranc osteoarthritis, unchanged from the prior exam  This in part may be related to Charcot's arthropathy              Workstation performed: [id number]                    Procedures  ECG 12 Lead Documentation  Date/Time: 12/25/2018 3:00 PM  Performed by: Francisco J Marshall  Authorized by: Francisco J Marshall     Indications / Diagnosis:  Toe ulcer  ECG reviewed by me, the ED Provider: yes    Patient location:  ED  Previous ECG:     Previous ECG:  Compared to current    Similarity:  No change  Interpretation:     Interpretation: normal    Rate:     ECG rate:  65    ECG rate assessment: normal    Rhythm:     Rhythm: sinus rhythm    Ectopy:     Ectopy: PVCs      PVCs:  Infrequent  QRS:     QRS axis:  Normal    QRS intervals:  Normal  Conduction:     Conduction: normal    ST segments:     ST segments:  Normal  T waves:     T waves: normal             Phone Contacts  ED Phone Contact    ED Course  ED Course as of Dec 25 1551   Tue Dec 25, 2018   1526 Paged podiatry    [phone] with Dr Nathanial Buerger, who agrees that admission is warranted, however advises medical admit w/ podiatry consultation    8082 CRP/ESR added per request from Dr Jian Penn Name 12/25/18 1520                Is the patient's history suggestive of a new or worsening infection? (!)  Yes (Proceed)  -DK        Suspected source of infection soft tissue  -DK        Are two or more of the following signs & symptoms of infection both present and new to the patient? No  -DK        Indicate SIRS criteria          If the answer is yes to both questions, suspicion of sepsis is present          If severe sepsis is present AND tissue hypoperfusion perists in the hour after fluid resuscitation or lactate > 4, the patient meets criteria for SEPTIC SHOCK          Are any of the following organ dysfunction criteria present within 6 hours of suspected infection and SIRS criteria that are NOT considered to be chronic conditions?         Organ dysfunction          Date of presentation of severe sepsis          Time of presentation of severe sepsis          Tissue hypoperfusion persists in the hour after crystalloid fluid administration, evidenced, by either:          Was hypotension present within one hour of the conclusion of crystalloid fluid administration?           Date of presentation of septic shock          Time of presentation of septic shock            User Key  (r) = Recorded By, (t) = Taken By, (c) = Cosigned By    [address] Name Provider Type    GLENROY Márquez PA-C Physician Assistant                  MDM  Number of Diagnoses or Management Options  Diabetic ulcer of toe of left foot associated with type 2 diabetes mellitus, limited to breakdown of skin [facility]): new and requires workup  Diagnosis management comments: 61-year-old male presents with of evolving left 4th toe diabetic ulcer with significant purulent discharge  X-ray reveals no obvious evidence of osteomyelitis, labs are unremarkable  Discussed the case with podiatry on-call Dr Katherin Son, who agrees that admission is indicated  Patient will be admitted to Medicine with podiatry consultation  Amount and/or Complexity of Data Reviewed  Clinical lab tests: ordered and reviewed  Tests in the radiology section of CPT®: ordered and reviewed  Tests in the medicine section of CPT®: ordered and reviewed  Review and summarize past medical records: yes  Discuss the patient with other providers: yes  Independent visualization of images, tracings, or specimens: yes      CritCare Time    Disposition  Final diagnoses:   Diabetic ulcer of toe of left foot associated with type 2 diabetes mellitus, limited to breakdown of skin ([facility] [address] )     Time reflects when diagnosis was documented in both MDM as applicable and the Disposition within this note     Time User Action Codes Description Comment    12/25/2018  3:23 PM Landy Wells Add [[id number],  [id number]] Diabetic ulcer of toe of left foot associated with type 2 diabetes mellitus, limited to breakdown of skin [facility])       ED Disposition     ED Disposition Condition Comment    Admit  Case was discussed with Dr Mary Jane Davies and the patient's admission status was agreed to be Admission Status: inpatient status to the service of Dr Mary Jane Davies   Follow-up Information    None         Patient's Medications   Discharge Prescriptions    No medications on file     No discharge procedures on file      ED Provider  Electronically Signed by           Rodolfo Arcos PA-C  12/25/18 1279

## 2018-12-26 ENCOUNTER — APPOINTMENT (INPATIENT)
Dept: NON INVASIVE DIAGNOSTICS | Facility: HOSPITAL | Age: 68
DRG: 617 | End: 2018-12-26
Payer: COMMERCIAL

## 2018-12-26 LAB
ALBUMIN SERPL BCP-MCNC: 2.7 G/DL (ref 3.5–5)
ALP SERPL-CCNC: 159 U/L (ref 46–116)
ALT SERPL W P-5'-P-CCNC: 23 U/L (ref 12–78)
ANION GAP SERPL CALCULATED.3IONS-SCNC: 14 MMOL/L (ref 4–13)
AST SERPL W P-5'-P-CCNC: 17 U/L (ref 5–45)
ATRIAL RATE: 394 BPM
ATRIAL RATE: 83 BPM
BASOPHILS # BLD AUTO: 0.03 THOUSANDS/ΜL (ref 0–0.1)
BASOPHILS NFR BLD AUTO: 0 % (ref 0–1)
BILIRUB SERPL-MCNC: 0.8 MG/DL (ref 0.2–1)
BUN SERPL-MCNC: 35 MG/DL (ref 5–25)
CALCIUM SERPL-MCNC: 8.4 MG/DL (ref 8.3–10.1)
CHLORIDE SERPL-SCNC: 101 MMOL/L (ref 100–108)
CO2 SERPL-SCNC: 22 MMOL/L (ref 21–32)
CREAT SERPL-MCNC: 1.34 MG/DL (ref 0.6–1.3)
EOSINOPHIL # BLD AUTO: 0.18 THOUSAND/ΜL (ref 0–0.61)
EOSINOPHIL NFR BLD AUTO: 2 % (ref 0–6)
ERYTHROCYTE [DISTWIDTH] IN BLOOD BY AUTOMATED COUNT: 13.8 % (ref 11.6–15.1)
GFR SERPL CREATININE-BSD FRML MDRD: 55 ML/MIN/1.73SQ M
GLUCOSE SERPL-MCNC: 120 MG/DL (ref 65–140)
GLUCOSE SERPL-MCNC: 127 MG/DL (ref 65–140)
GLUCOSE SERPL-MCNC: 133 MG/DL (ref 65–140)
GLUCOSE SERPL-MCNC: 170 MG/DL (ref 65–140)
GLUCOSE SERPL-MCNC: 185 MG/DL (ref 65–140)
HCT VFR BLD AUTO: 34.6 % (ref 36.5–49.3)
HGB BLD-MCNC: 10.9 G/DL (ref 12–17)
IMM GRANULOCYTES # BLD AUTO: 0.05 THOUSAND/UL (ref 0–0.2)
IMM GRANULOCYTES NFR BLD AUTO: 1 % (ref 0–2)
LYMPHOCYTES # BLD AUTO: 1.34 THOUSANDS/ΜL (ref 0.6–4.47)
LYMPHOCYTES NFR BLD AUTO: 16 % (ref 14–44)
MAGNESIUM SERPL-MCNC: 1.4 MG/DL (ref 1.6–2.6)
MCH RBC QN AUTO: 28.8 PG (ref 26.8–34.3)
MCHC RBC AUTO-ENTMCNC: 31.5 G/DL (ref 31.4–37.4)
MCV RBC AUTO: 92 FL (ref 82–98)
MONOCYTES # BLD AUTO: 0.84 THOUSAND/ΜL (ref 0.17–1.22)
MONOCYTES NFR BLD AUTO: 10 % (ref 4–12)
NEUTROPHILS # BLD AUTO: 6.19 THOUSANDS/ΜL (ref 1.85–7.62)
NEUTS SEG NFR BLD AUTO: 71 % (ref 43–75)
NRBC BLD AUTO-RTO: 0 /100 WBCS
PHOSPHATE SERPL-MCNC: 3.9 MG/DL (ref 2.3–4.1)
PLATELET # BLD AUTO: 201 THOUSANDS/UL (ref 149–390)
PMV BLD AUTO: 10.1 FL (ref 8.9–12.7)
POTASSIUM SERPL-SCNC: 4.3 MMOL/L (ref 3.5–5.3)
PROT SERPL-MCNC: 7.1 G/DL (ref 6.4–8.2)
QRS AXIS: 64 DEGREES
QRS AXIS: 69 DEGREES
QRSD INTERVAL: 110 MS
QRSD INTERVAL: 110 MS
QT INTERVAL: 434 MS
QT INTERVAL: 436 MS
QTC INTERVAL: 451 MS
QTC INTERVAL: 457 MS
RBC # BLD AUTO: 3.78 MILLION/UL (ref 3.88–5.62)
SODIUM SERPL-SCNC: 137 MMOL/L (ref 136–145)
T WAVE AXIS: -5 DEGREES
T WAVE AXIS: -6 DEGREES
VENTRICULAR RATE: 65 BPM
VENTRICULAR RATE: 66 BPM
WBC # BLD AUTO: 8.63 THOUSAND/UL (ref 4.31–10.16)

## 2018-12-26 PROCEDURE — 84100 ASSAY OF PHOSPHORUS: CPT | Performed by: INTERNAL MEDICINE

## 2018-12-26 PROCEDURE — 99222 1ST HOSP IP/OBS MODERATE 55: CPT | Performed by: INTERNAL MEDICINE

## 2018-12-26 PROCEDURE — 93925 LOWER EXTREMITY STUDY: CPT | Performed by: SURGERY

## 2018-12-26 PROCEDURE — 93010 ELECTROCARDIOGRAM REPORT: CPT | Performed by: INTERNAL MEDICINE

## 2018-12-26 PROCEDURE — 85025 COMPLETE CBC W/AUTO DIFF WBC: CPT | Performed by: INTERNAL MEDICINE

## 2018-12-26 PROCEDURE — 93971 EXTREMITY STUDY: CPT

## 2018-12-26 PROCEDURE — 99232 SBSQ HOSP IP/OBS MODERATE 35: CPT | Performed by: INTERNAL MEDICINE

## 2018-12-26 PROCEDURE — 83735 ASSAY OF MAGNESIUM: CPT | Performed by: INTERNAL MEDICINE

## 2018-12-26 PROCEDURE — 80053 COMPREHEN METABOLIC PANEL: CPT | Performed by: INTERNAL MEDICINE

## 2018-12-26 PROCEDURE — 93923 UPR/LXTR ART STDY 3+ LVLS: CPT

## 2018-12-26 PROCEDURE — 82948 REAGENT STRIP/BLOOD GLUCOSE: CPT

## 2018-12-26 PROCEDURE — 93925 LOWER EXTREMITY STUDY: CPT

## 2018-12-26 PROCEDURE — 93971 EXTREMITY STUDY: CPT | Performed by: SURGERY

## 2018-12-26 PROCEDURE — 93922 UPR/L XTREMITY ART 2 LEVELS: CPT | Performed by: SURGERY

## 2018-12-26 RX ORDER — INSULIN GLARGINE 100 [IU]/ML
50 INJECTION, SOLUTION SUBCUTANEOUS
Status: DISCONTINUED | OUTPATIENT
Start: 2018-12-26 | End: 2018-12-28

## 2018-12-26 RX ADMIN — INSULIN GLARGINE 50 UNITS: 100 INJECTION, SOLUTION SUBCUTANEOUS at 22:01

## 2018-12-26 RX ADMIN — INSULIN LISPRO 20 UNITS: 100 INJECTION, SOLUTION INTRAVENOUS; SUBCUTANEOUS at 17:18

## 2018-12-26 RX ADMIN — METOPROLOL TARTRATE 25 MG: 25 TABLET, FILM COATED ORAL at 17:19

## 2018-12-26 RX ADMIN — TORSEMIDE 20 MG: 20 TABLET ORAL at 17:18

## 2018-12-26 RX ADMIN — INSULIN LISPRO 20 UNITS: 100 INJECTION, SOLUTION INTRAVENOUS; SUBCUTANEOUS at 12:20

## 2018-12-26 RX ADMIN — FLUTICASONE FUROATE AND VILANTEROL TRIFENATATE 1 PUFF: 100; 25 POWDER RESPIRATORY (INHALATION) at 08:34

## 2018-12-26 RX ADMIN — CEFEPIME HYDROCHLORIDE 2000 MG: 2 INJECTION, SOLUTION INTRAVENOUS at 04:42

## 2018-12-26 RX ADMIN — CEFEPIME HYDROCHLORIDE 2000 MG: 2 INJECTION, SOLUTION INTRAVENOUS at 16:49

## 2018-12-26 RX ADMIN — METOPROLOL TARTRATE 25 MG: 25 TABLET, FILM COATED ORAL at 05:50

## 2018-12-26 RX ADMIN — HEPARIN SODIUM 5000 UNITS: 5000 INJECTION INTRAVENOUS; SUBCUTANEOUS at 05:50

## 2018-12-26 RX ADMIN — HEPARIN SODIUM 5000 UNITS: 5000 INJECTION INTRAVENOUS; SUBCUTANEOUS at 15:32

## 2018-12-26 RX ADMIN — INSULIN LISPRO 2 UNITS: 100 INJECTION, SOLUTION INTRAVENOUS; SUBCUTANEOUS at 22:03

## 2018-12-26 RX ADMIN — VANCOMYCIN HYDROCHLORIDE 2000 MG: 1 INJECTION, POWDER, LYOPHILIZED, FOR SOLUTION INTRAVENOUS at 09:30

## 2018-12-26 RX ADMIN — INSULIN LISPRO 20 UNITS: 100 INJECTION, SOLUTION INTRAVENOUS; SUBCUTANEOUS at 08:57

## 2018-12-26 RX ADMIN — TORSEMIDE 20 MG: 20 TABLET ORAL at 08:55

## 2018-12-26 RX ADMIN — HEPARIN SODIUM 5000 UNITS: 5000 INJECTION INTRAVENOUS; SUBCUTANEOUS at 22:02

## 2018-12-26 RX ADMIN — INSULIN LISPRO 2 UNITS: 100 INJECTION, SOLUTION INTRAVENOUS; SUBCUTANEOUS at 08:57

## 2018-12-26 RX ADMIN — LOSARTAN POTASSIUM 50 MG: 50 TABLET, FILM COATED ORAL at 08:55

## 2018-12-26 RX ADMIN — VANCOMYCIN HYDROCHLORIDE 2000 MG: 1 INJECTION, POWDER, LYOPHILIZED, FOR SOLUTION INTRAVENOUS at 23:37

## 2018-12-26 RX ADMIN — ASPIRIN 81 MG 81 MG: 81 TABLET ORAL at 08:55

## 2018-12-26 RX ADMIN — ALLOPURINOL 300 MG: 300 TABLET ORAL at 08:55

## 2018-12-26 NOTE — CONSULTS
Consultation - Infectious Disease   Evans Cuauhtemoc Cummins 79 y o  male MRN: [id number]  Unit/Bed#: [address] [place] [address] 202-02 Encounter: [id number]      Assessment/Plan   1  Gangrenous left 4th toe: Pt presented with increased drainage, foul odor, and dark discoloration of his left 4th toe c/w gangrenous changes  No fever or elevated WBC count  Bld and wound cx's are pending  Vascular studies are in progress  He is tentatively scheduled to undergo amputation of his left 4th toe tomorrow  A  Cont current abx for now  B  Awaiting results of cx's  C  Agree with plans for resection of left 4th toe - awaiting OR cx results    History of Present Illness   Physician Requesting Consult: Gill Hill MD  Reason for Consult / Principal Problem: Gangrenous left 4th ttoe    HPI: Radha Anaya is a 79y o  year old male with H/O left 4th toe ulcer, DM, HTN, obesity, CHF, GERD, COPD, and chronic left heel ulcer who was admitted on 12/25/18 with c/o drainage and foul odor from is left 4th toe  Pt reports that the toe has developed dark discoloration  He believes that the ulcer on his toe occurred after he bumped his foot on a chair  On admission, he did not have fever or elevated WBC count  Plain film of left foot did not show presence of osteo  He was placed on Vanco and Cefepime  Podiatry has evaluated the Pt  Vascular studies were ordered and Pt is tentatively scheduled to undergo resection of left 4th toe on Thursday  Pt states that he did not take any abx PTA  He denies cough, SOB, CP, N/V/D, abd pain or dysuria  Inpatient consult to Infectious Diseases  Consult performed by: Charisse Fulton ordered by: Hanna Mullins          ROS: 12 systems reviewed, remainder is neg      Historical Information   Past Medical History:   Diagnosis Date    CHF (congestive heart failure) ([address] )     [facility]     Decubitus ulcer of heel     LAST ASSESSED [id number]    [facility] [address] [facility]     History of varicose veins     Hypertension     Intermittent claudication (HCC)     Neuropathy     Seasonal allergies      Past Surgical History:   Procedure Laterality Date    ANGIOPLASTY      W/ FLUOROSC ANGIOGRAPGY PERIPHERAL ARTERY ADDITIONAL  LAST ASSESSED [id number]    ANGIOPLASTY / STENTING FEMORAL      TANSCATH INTRAVASCULAR STENT PLACEMENT PERCUTANEOUS FEMORAL     FULL THICKNESS SKIN GRAFT      TENDON REPAIR       Social History   History   Alcohol Use No     History   Drug Use No     History   Smoking Status    Never Smoker   Smokeless Tobacco    Never Used     Family History   Problem Relation Age of Onset    Other Mother         CARDIAC DISORDER     Diabetes Mother     Cancer Father     Other Family         CARDIAC DISORDER     Diabetes Family     Cancer Family     Mental illness Family        Meds/Allergies   MEDS:  Vanco: #2  Cefepime: #2      Current Facility-Administered Medications:     acetaminophen (TYLENOL) tablet 650 mg, 650 mg, Oral, Q6H PRN, Beba Galvan MD    albuterol (PROVENTIL HFA,VENTOLIN HFA) inhaler 2 puff, 2 puff, Inhalation, Q6H PRN, Beba Galvan MD    allopurinol (ZYLOPRIM) tablet 300 mg, 300 mg, Oral, Daily, Beba Galvan MD, 300 mg at 12/26/18 0855    aspirin chewable tablet 81 mg, 81 mg, Oral, Daily, Beba Galvan MD, 81 mg at 12/26/18 0855    cefepime (MAXIPIME) 2 g/50 mL dextrose IVPB, 2,000 mg, Intravenous, Q12H, Beba Galvan MD, Last Rate: 100 mL/hr at 12/26/18 0442, 2,000 mg at 12/26/18 0442    fluticasone-vilanterol (BREO ELLIPTA) 100-25 mcg/inh inhaler 1 puff, 1 puff, Inhalation, Daily, Beba Galvan MD, 1 puff at 12/26/18 0834    heparin (porcine) subcutaneous injection 5,000 Units, 5,000 Units, Subcutaneous, Q8H [facility], 5,000 Units at 12/26/18 0550 **AND** [CANCELED] Platelet count, , , Once, Beba Galvan MD    insulin glargine (LANTUS) subcutaneous injection 50 Units 0 5 mL, 50 Units, Subcutaneous, HS, Ondina Underwood DO    insulin lispro (HumaLOG) 100 units/mL subcutaneous injection 2-12 Units, 2-12 Units, Subcutaneous, TID AC, 2 Units at 12/26/18 0857 **AND** Fingerstick Glucose (POCT), , , TID AC, Kim Tyler MD    insulin lispro (HumaLOG) 100 units/mL subcutaneous injection 2-12 Units, 2-12 Units, Subcutaneous, HS, Alfonzo Toure PA-C, 2 Units at 12/25/18 2148    insulin lispro (HumaLOG) 100 units/mL subcutaneous injection 20 Units, 20 Units, Subcutaneous, TID With Meals, Ondina Underwood DO, 20 Units at 12/26/18 1220    losartan (COZAAR) tablet 50 mg, 50 mg, Oral, Daily, Kim Tyler MD, 50 mg at 12/26/18 0855    metoprolol tartrate (LOPRESSOR) tablet 25 mg, 25 mg, Oral, Q12H, Kim Tyler MD, 25 mg at 12/26/18 0550    ondansetron (ZOFRAN) injection 4 mg, 4 mg, Intravenous, Q6H PRN, Kim Tyler MD    torsemide (DEMADEX) tablet 20 mg, 20 mg, Oral, BID, Kim Tyler MD, 20 mg at 12/26/18 0855    vancomycin (VANCOCIN) 2,000 mg in sodium chloride 0 9 % 500 mL IVPB, 2,000 mg, Intravenous, Q12H, Kim Tyler MD, Last Rate: 250 mL/hr at 12/26/18 0930, 2,000 mg at 12/26/18 0930    Allergies   Allergen Reactions    Latex Rash         Intake/Output Summary (Last 24 hours) at 12/26/18 1253  Last data filed at 12/25/18 1602   Gross per 24 hour   Intake               50 ml   Output                0 ml   Net               50 ml       PE:  WD, WN, obese WM in NAD  VSS, Tmax: 98 7  HEENT:  No scleral icterus, pharynx clear  NECK: Supple  CARDIAC:  RRR, nml S1, S2  LUNGS:  Clear  ABDOMEN:  +BS, soft, nontender  EXTREMITIES:  +Swelling of both feet with venous stasis changes (L >> R), +Intact dressing on left foot   SKIN: No rash  NEURO: Grossly nonfocal    Invasive Devices:   Peripheral IV 12/25/18 Left Hand (Active)   Site Assessment Clean;Dry; Intact 12/26/2018  9:00 AM   Dressing Type Transparent 12/26/2018  9:00 AM   Line Status Flushed; Infusing 12/26/2018  9:00 AM Dressing Status Clean;Dry; Intact 12/26/2018  9:00 AM           Lab Results:   Admission on 12/25/2018   Component Date Value    WBC 12/25/2018 7 48     RBC 12/25/2018 3 97     Hemoglobin 12/25/2018 11 5*    Hematocrit 12/25/2018 36 5     MCV 12/25/2018 92     MCH 12/25/2018 29 0     MCHC 12/25/2018 31 5     RDW 12/25/2018 13 7     MPV 12/25/2018 9 8     Platelets 35/12/0798 196     nRBC 12/25/2018 0     Neutrophils Relative 12/25/2018 68     Immat GRANS % 12/25/2018 1     Lymphocytes Relative 12/25/2018 18     Monocytes Relative 12/25/2018 10     Eosinophils Relative 12/25/2018 3     Basophils Relative 12/25/2018 0     Neutrophils Absolute 12/25/2018 5 13     Immature Grans Absolute 12/25/2018 0 04     Lymphocytes Absolute 12/25/2018 1 34     Monocytes Absolute 12/25/2018 0 73     Eosinophils Absolute 12/25/2018 0 21     Basophils Absolute 12/25/2018 0 03     Sodium 12/25/2018 138     Potassium 12/25/2018 4 5     Chloride 12/25/2018 101     CO2 12/25/2018 26     ANION GAP 12/25/2018 11     BUN 12/25/2018 37*    Creatinine 12/25/2018 1 19     Glucose 12/25/2018 190*    Calcium 12/25/2018 8 6     AST 12/25/2018 26     ALT 12/25/2018 26     Alkaline Phosphatase 12/25/2018 167*    Total Protein 12/25/2018 7 5     Albumin 12/25/2018 2 9*    Total Bilirubin 12/25/2018 0 60     eGFR 12/25/2018 63     LACTIC ACID 12/25/2018 1 7     Wound Culture 12/25/2018 Culture too young- will reincubate     Gram Stain Result 12/25/2018 No polys seen     Gram Stain Result 12/25/2018 3+ Gram negative rods     Gram Stain Result 12/25/2018 2+ Gram positive rods     Gram Stain Result 12/25/2018 2+ Gram positive cocci in pairs     Gram Stain Result 12/25/2018 2+ Gram positive cocci in clusters     Sed Rate 12/25/2018 63*    CRP 12/25/2018 76 4*    POC Glucose 12/25/2018 181*    POC Glucose 12/25/2018 176*    Sodium 12/26/2018 137     Potassium 12/26/2018 4 3     Chloride 12/26/2018 101  CO2 12/26/2018 22     ANION GAP 12/26/2018 14*    BUN 12/26/2018 35*    Creatinine 12/26/2018 1 34*    Glucose 12/26/2018 133     Calcium 12/26/2018 8 4     AST 12/26/2018 17     ALT 12/26/2018 23     Alkaline Phosphatase 12/26/2018 159*    Total Protein 12/26/2018 7 1     Albumin 12/26/2018 2 7*    Total Bilirubin 12/26/2018 0 80     eGFR 12/26/2018 55     Magnesium 12/26/2018 1 4*    Phosphorus 12/26/2018 3 9     WBC 12/26/2018 8 63     RBC 12/26/2018 3 78*    Hemoglobin 12/26/2018 10 9*    Hematocrit 12/26/2018 34 6*    MCV 12/26/2018 92     MCH 12/26/2018 28 8     MCHC 12/26/2018 31 5     RDW 12/26/2018 13 8     MPV 12/26/2018 10 1     Platelets 53/05/4627 201     nRBC 12/26/2018 0     Neutrophils Relative 12/26/2018 71     Immat GRANS % 12/26/2018 1     Lymphocytes Relative 12/26/2018 16     Monocytes Relative 12/26/2018 10     Eosinophils Relative 12/26/2018 2     Basophils Relative 12/26/2018 0     Neutrophils Absolute 12/26/2018 6 19     Immature Grans Absolute 12/26/2018 0 05     Lymphocytes Absolute 12/26/2018 1 34     Monocytes Absolute 12/26/2018 0 84     Eosinophils Absolute 12/26/2018 0 18     Basophils Absolute 12/26/2018 0 03     Ventricular Rate 12/25/2018 65     Atrial Rate 12/25/2018 394     QRSD Interval 12/25/2018 110     QT Interval 12/25/2018 434     QTC Interval 12/25/2018 451     QRS Axis 12/25/2018 64     T Wave Axis 12/25/2018 -6     Ventricular Rate 12/25/2018 66     Atrial Rate 12/25/2018 83     QRSD Interval 12/25/2018 110     QT Interval 12/25/2018 436     QTC Interval 12/25/2018 457     QRS Axis 12/25/2018 69     T Wave Axis 12/25/2018 -5     POC Glucose 12/26/2018 170*    POC Glucose 12/26/2018 127      Imaging Studies: I have personally reviewed pertinent reports  EKG, Pathology, and Other Studies: I have personally reviewed pertinent reports        Culture  Lab Results   Component Value Date    BLOODCX No Growth After 5 Days  03/23/2018    BLOODCX No Growth After 5 Days  03/23/2018    BLOODCX No Growth After 5 Days  10/08/2017    BLOODCX No Growth After 5 Days  10/08/2017    BLOODCX No Growth After 5 Days  09/15/2016    BLOODCX No Growth After 5 Days   09/15/2016     Lab Results   Component Value Date    WOUNDCULT Culture too young- will reincubate 12/25/2018    WOUNDCULT 4+ Growth of Staphylococcus aureus 09/15/2016    WOUNDCULT 4+ Growth of Proteus mirabilis 09/15/2016    WOUNDCULT 4+ Growth of Mixed Skin Nini 09/15/2016     No results found for: URINECX  No results found for: SPUTUMCULTUR    Principal Problem:    Diabetic ulcer of toe of left foot associated with type 2 diabetes mellitus, limited to breakdown of skin ([address] )  Active Problems:    Diabetic foot ulcer ([address] )    Hypertension    Chronic atrial fibrillation ([address] )    Morbid obesity ([address] )    Chronic diastolic congestive heart failure (HCC)    GERD (gastroesophageal reflux disease)    Hyperlipidemia

## 2018-12-26 NOTE — CONSULTS
Consult - Podiatry   [facility] Anshul Cummins 79 y o  male MRN: [id number]  Unit/Bed#: [address] 202-02 Encounter: [id number]    Assessment/Plan     Assessment:    1  Diabetic peripheral neuropathy of lower extremities  2  Diabetic foot ulcer distal 4th toe Left foot (Stage IV)  3  Diabetic foot ulcer plantar heel Left foot (Stage I)  4  PAD    Plan:  1  LEADS (originally ordered by Dr Roosevelt Reynolds Doctor in June but not completed)  2  Probable toe amputation pending #1  3  IVABX    History of Present Illness     HPI:  Bong Coto is a 79 y o  male who presents with a new draining ulcer tip of the 4th toe of unknown origin - though the patient repeatedly states he thinks he bumped it on a chair at home  The toe was noted (odor, drainage, discoloration) by his [facility] nurses which were currently seeing him for the pre-existing heel ulcer; and he was sent in through the ER  He has been an oupatient at the wound center seeing primarily Dr Christofer Pena for the heel  Consults  Review of Systems   Constitutional: Negative  HENT: Negative  Eyes: Negative  Respiratory: Negative  Cardiovascular: Negative  Gastrointestinal: Negative  Musculoskeletal: Negative   Skin:Mr Cummins is aware of both wounds   Neurological: Negative  Psych: negative         Historical Information   Past Medical History:   Diagnosis Date    [facility]     COPD [facility]ch[facility] obstructive pulmonary disease) [facility]     Decubitus ulcer of heel     LAST ASSESSED [id number]    [facility] mellitus [facility] [address] [facility]     History of varicose veins     Hypertension     Intermittent claudication ([facility])     Neuropathy     Seasonal allergies      Past Surgical History:   Procedure Laterality Date    ANGIOPLASTY      W/ FLUOROSC ANGIOGRAPGY PERIPHERAL ARTERY ADDITIONAL  LAST ASSESSED [id number]    ANGIOPLASTY / STENTING FEMORAL      TANSCATH INTRAVASCULAR STENT PLACEMENT PERCUTANEOUS FEMORAL     FULL THICKNESS SKIN GRAFT      TENDON REPAIR       Social History   History   Alcohol Use No     History   Drug Use No     History   Smoking Status    Never Smoker   Smokeless Tobacco    Never Used     Family History:   Family History   Problem Relation Age of Onset    Other Mother         CARDIAC DISORDER     Diabetes Mother     Cancer Father     Other Family         CARDIAC DISORDER     Diabetes Family     Cancer Family     Mental illness Family        Meds/Allergies   Prescriptions Prior to Admission   Medication    albuterol (PROVENTIL HFA,VENTOLIN HFA) 90 mcg/act inhaler    allopurinol (ZYLOPRIM) 300 mg tablet    aspirin 81 mg chewable tablet    fluticasone-vilanterol (BREO ELLIPTA) 100-25 mcg/inh inhaler    insulin regular (NOVOLIN R RELION) 100 units/mL injection    LEVEMIR FLEXTOUCH 100 units/mL injection pen    losartan (COZAAR) 50 mg tablet    metFORMIN (GLUCOPHAGE) 1000 MG tablet    metoprolol tartrate (LOPRESSOR) 25 mg tablet    Nutritional Supplements (PROSOURCE NO CARB) LIQD    spironolactone (ALDACTONE) 25 mg tablet    torsemide (DEMADEX) 20 mg tablet     Allergies   Allergen Reactions    Latex Rash       Objective   First Vitals:   Blood Pressure: 145/84 (12/25/18 1411)  Pulse: 78 (12/25/18 1411)  Temperature: 98 1 °F (36 7 °C) (12/25/18 1411)  Temp Source: Tympanic (12/25/18 1411)  Respirations: 20 (12/25/18 1411)  Height: 6' 3" (190 5 cm) (12/25/18 1712)  Weight - Scale: (!) 164 kg (361 lb 8 9 oz) (12/25/18 1411)  SpO2: 98 % (12/25/18 1411)    Current Vitals:   Blood Pressure: 111/55 (12/26/18 0700)  Pulse: 92 (12/26/18 0700)  Temperature: 98 7 °F (37 1 °C) (12/26/18 0700)  Temp Source: Oral (12/26/18 0700)  Respirations: 18 (12/26/18 0700)  Height: 6' 3" (190 5 cm) (12/25/18 1712)  Weight - Scale: (!) 170 kg (373 lb 10 9 oz) (12/26/18 0700)  SpO2: 92 % (12/26/18 0700)        /55 (BP Location: Right arm)   Pulse 92   Temp 98 7 °F (37 1 °C) (Oral)   Resp 18   Ht 6' 3" (1 905 m)   Wt (!) 170 kg (373 lb 10 9 oz)   SpO2 92%   BMI 46 71 kg/m²      General Appearance:    Alert, cooperative, no distress   Head:    Normocephalic, without obvious abnormality, atraumatic   Eyes:    PERRL, conjunctiva/corneas clear, EOM's intact        Nose:   Moist mucous membranes   Neck:   Supple, symmetrical, trachea midline   Back:     Symmetric   Lungs:     Respirations unlabored   Heart:    Regular rate and rhythm, S1 and S2 normal, no murmur, rub   or gallop   Abdomen:     Soft, non-tender   Extremities:   Edema noted bilateral lower extremities   Pulses:   Difficult to palpate due to the chronic edema; his legs have chronic lipodermatosclerosis and hemosiderin pigmentation consistent with venous stasis as well  Skin:   #1  Distal Left 4th toe is grayish black, malodorous with serous drainage  The distal 1/3 of the toe is nonviable  There is an ulcer which I can't probe directly to bone but it measures about 1 3cm in diameter and  4cm deep  #2  A residual plantar Left heel ulcer is present which is mostly now just dried keratotic tissue without drainage  Neurologic:   Gross sensation is intact  Protective sensation is absent             Lab Results:   Admission on 12/25/2018   Component Date Value    WBC 12/25/2018 7 48     RBC 12/25/2018 3 97     Hemoglobin 12/25/2018 11 5*    Hematocrit 12/25/2018 36 5     MCV 12/25/2018 92     MCH 12/25/2018 29 0     MCHC 12/25/2018 31 5     RDW 12/25/2018 13 7     MPV 12/25/2018 9 8     Platelets 42/53/4902 196     nRBC 12/25/2018 0     Neutrophils Relative 12/25/2018 68     Immat GRANS % 12/25/2018 1     Lymphocytes Relative 12/25/2018 18     Monocytes Relative 12/25/2018 10     Eosinophils Relative 12/25/2018 3     Basophils Relative 12/25/2018 0     Neutrophils Absolute 12/25/2018 5 13     Immature Grans Absolute 12/25/2018 0 04     Lymphocytes Absolute 12/25/2018 1 34     Monocytes Absolute 12/25/2018 0 73     Eosinophils Absolute 12/25/2018 0 21     Basophils Absolute 12/25/2018 0 03     Sodium 12/25/2018 138     Potassium 12/25/2018 4 5     Chloride 12/25/2018 101     CO2 12/25/2018 26     ANION GAP 12/25/2018 11     BUN 12/25/2018 37*    Creatinine 12/25/2018 1 19     Glucose 12/25/2018 190*    Calcium 12/25/2018 8 6     AST 12/25/2018 26     ALT 12/25/2018 26     Alkaline Phosphatase 12/25/2018 167*    Total Protein 12/25/2018 7 5     Albumin 12/25/2018 2 9*    Total Bilirubin 12/25/2018 0 60     eGFR 12/25/2018 63     LACTIC ACID 12/25/2018 1 7     Sed Rate 12/25/2018 63*    CRP 12/25/2018 76 4*    POC Glucose 12/25/2018 181*    POC Glucose 12/25/2018 176*    Sodium 12/26/2018 137     Potassium 12/26/2018 4 3     Chloride 12/26/2018 101     CO2 12/26/2018 22     ANION GAP 12/26/2018 14*    BUN 12/26/2018 35*    Creatinine 12/26/2018 1 34*    Glucose 12/26/2018 133     Calcium 12/26/2018 8 4     AST 12/26/2018 17     ALT 12/26/2018 23     Alkaline Phosphatase 12/26/2018 159*    Total Protein 12/26/2018 7 1     Albumin 12/26/2018 2 7*    Total Bilirubin 12/26/2018 0 80     eGFR 12/26/2018 55     Magnesium 12/26/2018 1 4*    Phosphorus 12/26/2018 3 9     WBC 12/26/2018 8 63     RBC 12/26/2018 3 78*    Hemoglobin 12/26/2018 10 9*    Hematocrit 12/26/2018 34 6*    MCV 12/26/2018 92     MCH 12/26/2018 28 8     MCHC 12/26/2018 31 5     RDW 12/26/2018 13 8     MPV 12/26/2018 10 1     Platelets 01/81/6803 201     nRBC 12/26/2018 0     Neutrophils Relative 12/26/2018 71     Immat GRANS % 12/26/2018 1     Lymphocytes Relative 12/26/2018 16     Monocytes Relative 12/26/2018 10     Eosinophils Relative 12/26/2018 2     Basophils Relative 12/26/2018 0     Neutrophils Absolute 12/26/2018 6 19     Immature Grans Absolute 12/26/2018 0 05     Lymphocytes Absolute 12/26/2018 1 34     Monocytes Absolute 12/26/2018 0 84     Eosinophils Absolute 12/26/2018 0 18     Basophils Absolute 12/26/2018 0 03     Ventricular Rate 12/25/2018 65     Atrial Rate 12/25/2018 394     QRSD Interval 12/25/2018 110     QT Interval 12/25/2018 434     QTC Interval 12/25/2018 451     QRS Axis 12/25/2018 64     T Wave Axis 12/25/2018 -6     Ventricular Rate 12/25/2018 66     Atrial Rate 12/25/2018 83     QRSD Interval 12/25/2018 110     QT Interval 12/25/2018 436     QTC Interval 12/25/2018 457     QRS Axis 12/25/2018 69     T Wave Axis 12/25/2018 -5     POC Glucose 12/26/2018 170*                   Imaging: I have personally reviewed pertinent films in PACS  EKG, Pathology, and Other Studies: I have personally reviewed pertinent reports

## 2018-12-26 NOTE — TREATMENT PLAN
Podiatry update: Awaiting arterial doppler report   If no significant changes from previous 2017 study, will plan for toe amputation tomorrow afternoon (Thursday)

## 2018-12-26 NOTE — UTILIZATION REVIEW
Initial Clinical Review  Admission: Date/Time/Statement: 12/25/18 @ 1545   Orders Placed This Encounter   Procedures    Inpatient Admission (expected length of stay for this patient is greater than two midnights)     Standing Status:   Standing     Number of Occurrences:   1     Order Specific Question:   Admitting Physician     Answer:   Meenakshi Smith [[id number]]     Order Specific Question:   Level of Care     Answer:   Med Surg [16]     Order Specific Question:   Estimated length of stay     Answer:   More than 2 Midnights     Order Specific Question:   Certification     Answer:   I certify that inpatient services are medically necessary for this patient for a duration of greater than two midnights  See H&P and MD Progress Notes for additional information about the patient's course of treatment  ED: Date/Time/Mode of Arrival:   ED Arrival Information     Expected Arrival Acuity Means of Arrival Escorted By Service Admission Type    - 12/25/2018 13:55 Urgent Walk-In Self General Medicine Urgent    Arrival Complaint    Toe Infection      Chief Complaint:   Chief Complaint   Patient presents with    Foot Ulcer     Refered to ED by [facility]NA for evaluation of left foot "infection"  Pt states that he has [facility]NA for wound care for chronic ulcers  Denies any fever or chills, drainage  Has noted " a bad smell"   History of Illness:   80-year-old male with history of CHF, COPD, insulin-dependent diabetes, chronic left heel decubitus ulcer, and peripheral arterial disease presents emergency department for evaluation of left 4th toe ulceration  He was seen by home care nurse this morning was concerned for purulent discharge and malodor, and was sent into the emergency department  Patient has chronic neuropathy and has no pain to the site  He has 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     ED Vital Signs:   ED Triage Vitals [12/25/18 1411]   Temperature Pulse Respirations Blood Pressure SpO2   98 1 °F (36 7 °C) 78 20 145/84 98 %      Temp Source Heart Rate Source Patient Position - Orthostatic VS BP Location FiO2 (%)   Tympanic Monitor Sitting Right arm --      Pain Score       No Pain        Wt Readings from Last 1 Encounters:   12/26/18 (!) 170 kg (373 lb 10 9 oz)   Vital Signs (abnormal): Abnormal Labs/Diagnostic Test Results:   HGB 11 5 BUN 37 GLUC 190 ALK PHOS 167 ALB 2 9 SED RATE 63 CRP 76 4   L FOOT XRAY=1  No radiographic findings of osteomyelitis  If there is persistent clinical concern, further evaluation with MRI would be beneficial   2  Moderate diffuse soft tissue swelling about the dorsum of the foot  3  Moderate Lisfranc osteoarthritis, unchanged from the prior exam  This in part may be related to Charcot's arthropathy  ED Treatment:   Medication Administration from 12/25/2018 1355 to 12/25/2018 1710       Date/Time Order Dose Route Action Action by Comments     12/25/2018 1451 sodium chloride 0 9 % bolus 1,000 mL 1,000 mL Intravenous New Bag Eugenio Jason RN      12/25/2018 1602 cefepime (MAXIPIME) 2 g/50 mL dextrose IVPB 0 mg Intravenous Stopped Eugenio Jason RN      12/25/2018 1529 cefepime (MAXIPIME) 2 g/50 mL dextrose IVPB 2,000 mg Intravenous New Bag Eugenio Jason RN      12/25/2018 1602 vancomycin (VANCOCIN) 2,000 mg in sodium chloride 0 9 % 500 mL IVPB 2,000 mg Intravenous New Bag Eugenio Jason RN       Past Medical/Surgical History:    Active Ambulatory Problems     Diagnosis Date Noted    Diabetic foot ulcer ([place] [address] ) 09/15/2016    Diabetes mellitus type 2, uncontrolled ([place] [address] ) 09/15/2016    Gout 09/15/2016    Chronic venous hypertension w ulceration, bilateral ([place] [address] ) 09/15/2016    Hypertension 09/15/2016    Chronic atrial fibrillation ([place] [address] ) 10/09/2017    Morbid obesity ([place] [address] ) 10/09/2017    Chronic diastolic congestive heart failure ([place] [address] ) 10/11/2017    Cardiomyopathy ([place] [address] ) 02/17/2016    Diabetes, polyneuropathy ([place] [address] ) 08/21/2014    GERD (gastroesophageal reflux disease) 07/24/2014    Hyperlipidemia 07/24/2014    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, right ([facility] ) 04/04/2016    Varicose veins of lower extremity with ulcer, left ([place] [address] ) 02/16/2017    Onychomycosis 10/27/2015    Gallstones 07/05/2017    Blood alkaline phosphatase increased compared with prior measurement 06/05/2017    Chronic heel ulcer, left, with fat layer exposed ([place] [address] ) 11/05/2015    Localized edema 04/16/2018     Resolved Ambulatory Problems     Diagnosis Date Noted    CHF (congestive heart failure) ([place] [address] ) 09/15/2016    Diarrhea 09/17/2016    Acute gout 10/09/2017     Past Medical History:   Diagnosis Date    CHF (congestive heart failure) (HCC)     COPD (chronic obstructive pulmonary disease) (HCC)     Decubitus ulcer of heel     Diabetes mellitus (HCC)     History of varicose veins     Hypertension     Intermittent claudication (HCC)     Neuropathy     Seasonal allergies    Admitting Diagnosis: Foot ulcer ([place] [address] ) [L97 509]  Diabetic ulcer of toe of left foot associated with type 2 diabetes mellitus, limited to breakdown of skin ([place] [address] ) [Z09 650, L97 521]  Age/Sex: 79 y o  male  Assessment/Plan:   · Diabetic foot ulcer/cellulitis  Will continue on cefepime and vancomycin  Elevate left lower extremity  Will order venous duplex of left lower extremity  Podiatry and Infectious Disease input  PER PODIATRY:  Skin:   #1  Distal Left 4th toe is grayish black, malodorous with serous drainage  The distal 1/3 of the toe is nonviable  There is an ulcer which I can't probe directly to bone but it measures about 1 3cm in diameter and  4cm deep  #2  A residual plantar Left heel ulcer is present which is mostly now just dried keratotic tissue without drainage  1  Diabetic peripheral neuropathy of lower extremities  2  Diabetic foot ulcer distal 4th toe Left foot (Stage IV)  3  Diabetic foot ulcer plantar heel Left foot (Stage I)  4  PAD  Plan:  1  LEADS   2   Probable toe amputation pending #1  3  IVABX  Admission Orders:  MED SURG  PT/OT EVAL & TX  CONSULT PODIATRY  O2 TO KEEP SATS>92%  VENODYNES  ACCUCHECKS WITH COVERAGE SCALE  CONSULT ENDOCRINOLOGY  CONSULT ID  Scheduled Meds:   Current Facility-Administered Medications:  acetaminophen 650 mg Oral Q6H PRN Matthew Carr MD    albuterol 2 puff Inhalation Q6H PRN Matthew Carr MD    allopurinol 300 mg Oral Daily Matthew Carr MD    aspirin 81 mg Oral Daily Matthew Carr MD    cefepime 2,000 mg Intravenous Q12H Matthew Carr MD Last Rate: 2,000 mg (12/26/18 9049)   fluticasone-vilanterol 1 puff Inhalation Daily Matthew Carr MD    heparin (porcine) 5,000 Units Subcutaneous Q8H [address] Matthew Carr MD    insulin glargine 50 Units Subcutaneous HS Sheldon Peck DO    insulin lispro 2-12 Units Subcutaneous TID AC Matthew Carr MD    insulin lispro 2-12 Units Subcutaneous HS George Chang PA-C    insulin lispro 20 Units Subcutaneous TID With Meals Sheldon Peck DO    losartan 50 mg Oral Daily Matthew Carr MD    metoprolol tartrate 25 mg Oral Q12H Matthew Carr MD    ondansetron 4 mg Intravenous Q6H PRN Matthew Carr MD    sodium chloride 100 mL/hr Intravenous Continuous Matthew Carr MD Last Rate: 100 mL/hr (12/25/18 2147)   torsemide 20 mg Oral BID Matthew Carr MD    vancomycin 2,000 mg Intravenous Q12H Matthew Carr MD Last Rate: 2,000 mg (12/26/18 1911)     Continuous Infusions:   sodium chloride 100 mL/hr Last Rate: 100 mL/hr (12/25/18 2147)     PRN Meds:   acetaminophen    albuterol    ondansetron

## 2018-12-26 NOTE — CONSULTS
Consultation - Mauri Cummins 79 y o  male MRN: [id number]    Unit/Bed#: [address] [place] [address] [id number] Encounter: [id number]      Assessment/Plan     Assessment/Plan:  1  Type 2 diabetes with peripheral neuropathy and long-term insulin use: Will discontinue metformin while in the hospital   While initial workup is going and surgeries being considered, will change to basal bolus insulin regimen with Lantus 50 units q h s  And Humalog 20 a c  Plus scale for correction  Will need to adjust regimen as needed  Monitor for hypoglycemia  One closer to discharge, we will need to find insulin regimen that patient can afford  Upon discharge, if Lantus is not the preferred basal insulin for the patient's insurance company, we can use Tresiba, Basaglar, Levemir, Toujeo at the same dose instead  Upon discharge, if Humalog is not the preferred mealtime insulin for the patient's insurance, we can use NovoLog, Fiasp, Admelog, or Apidra at the same dose instead  If none of the above are affordable, Novolin 70/30 dosed b i d  A c  from [employer] via vial and syringe would likely be the best scenario  Ideally he will follow-up with Endocrinology as an outpatient as well  2  Diabetic foot ulcer and infection:  Care per primary team and workup undergoing along with Podiatry  CC: Diabetes Consult    History of Present Illness     HPI: Luis Driscoll is a 79y o  year old male with type 2 diabetes for 5 years  He is on insulin at home  He states he was on regular insulin 4 times a day  In the past was supposed to be on levemir but this was too expensive  He denies any polyuria, polydipsia, nocturia and blurry vision  He denies nephropathy, retinopathy, heart attack and stroke but does admit to neuropathy  He denies any hypoglycemia  States sugars have been running in 200s generally  He is admitted with diabetic foot ulcer and cellulitis and is undergoing work up by primary team and podiatry    Reports good appetite  Consults    Review of Systems   Constitutional: Negative for appetite change and fever  HENT: Negative for congestion and trouble swallowing  Eyes: Negative for visual disturbance  Respiratory: Negative for shortness of breath  Cardiovascular: Negative for palpitations and leg swelling  Gastrointestinal: Negative for abdominal pain, nausea and vomiting  Endocrine: Negative for polydipsia and polyuria  Genitourinary: Negative for difficulty urinating and frequency  Musculoskeletal: Negative for arthralgias  Skin: Positive for rash and wound  Neurological: Negative for dizziness and weakness  Psychiatric/Behavioral: Negative for agitation and confusion         Historical Information   Past Medical History:   Diagnosis Date    [facility]     COPD (chronic obstructive pulmonary disease) ([facility]     Decubitus ulcer of heel     LAST ASSESSED [id number]    Diabetes mellitus [facility] )     History of varicose veins     Hypertension     Intermittent claudication ([facility])     Neuropathy     Seasonal allergies      Past Surgical History:   Procedure Laterality Date    ANGIOPLASTY      W/ FLUOROSC ANGIOGRAPGY PERIPHERAL ARTERY ADDITIONAL  LAST ASSESSED [id number]    ANGIOPLASTY / STENTING FEMORAL      TANSCATH INTRAVASCULAR STENT PLACEMENT PERCUTANEOUS FEMORAL     FULL THICKNESS SKIN GRAFT      TENDON REPAIR       Social History   History   Alcohol Use No     History   Drug Use No     History   Smoking Status    Never Smoker   Smokeless Tobacco    Never Used     Family History:   Family History   Problem Relation Age of Onset    Other Mother         CARDIAC DISORDER     Diabetes Mother     Cancer Father     Other Family         CARDIAC DISORDER     Diabetes Family     Cancer Family     Mental illness Family        Meds/Allergies   Current Facility-Administered Medications   Medication Dose Route Frequency Provider Last Rate Last Dose    acetaminophen (TYLENOL) tablet 650 mg  650 mg Oral Q6H PRN Daiana Soto MD        albuterol (PROVENTIL HFA,VENTOLIN HFA) inhaler 2 puff  2 puff Inhalation Q6H PRN Daiana Soto MD        allopurinol (ZYLOPRIM) tablet 300 mg  300 mg Oral Daily Daiana Soto MD        aspirin chewable tablet 81 mg  81 mg Oral Daily Daiana Soto MD        cefepime (MAXIPIME) 2 g/50 mL dextrose IVPB  2,000 mg Intravenous Q12H Daiana Soto  mL/hr at 12/26/18 0442 2,000 mg at 12/26/18 0442    fluticasone-vilanterol (BREO ELLIPTA) 100-25 mcg/inh inhaler 1 puff  1 puff Inhalation Daily Daiana Soto MD        heparin (porcine) subcutaneous injection 5,000 Units  5,000 Units Subcutaneous Q8H Mitch Martinez MD   5,000 Units at 12/26/18 0550    insulin lispro (HumaLOG) 100 units/mL subcutaneous injection 2-12 Units  2-12 Units Subcutaneous TID AC Daiana Soto MD        insulin lispro (HumaLOG) 100 units/mL subcutaneous injection 2-12 Units  2-12 Units Subcutaneous HS Emily Guerrier PA-C   2 Units at 12/25/18 2148    insulin regular (HumuLIN R,NovoLIN R) injection 35 Units  35 Units Subcutaneous TID AC Daiana Soto MD   35 Units at 12/25/18 1834    losartan (COZAAR) tablet 50 mg  50 mg Oral Daily Daiana Soto MD        metFORMIN (GLUCOPHAGE) tablet 1,000 mg  1,000 mg Oral BID With Meals Daiana Soto MD   1,000 mg at 12/25/18 1834    metoprolol tartrate (LOPRESSOR) tablet 25 mg  25 mg Oral Q12H Daiana Soto MD   25 mg at 12/26/18 0550    ondansetron (ZOFRAN) injection 4 mg  4 mg Intravenous Q6H PRN Daiana Soto MD        sodium chloride 0 9 % infusion  100 mL/hr Intravenous Continuous Daiana Soto  mL/hr at 12/25/18 2147 100 mL/hr at 12/25/18 2147    torsemide (DEMADEX) tablet 20 mg  20 mg Oral BID Daiana Soto MD   20 mg at 12/25/18 1834    vancomycin (VANCOCIN) 2,000 mg in sodium chloride 0 9 % 500 mL IVPB  2,000 mg Intravenous Q12H Daiana Soto,  mL/hr at 12/26/18 0633 2,000 mg at 12/26/18 1088     Allergies   Allergen Reactions    Latex Rash       Objective   Vitals: Blood pressure 111/55, pulse 92, temperature 98 7 °F (37 1 °C), temperature source Oral, resp  rate 18, height 6' 3" (1 905 m), weight (!) 170 kg (373 lb 10 9 oz), SpO2 92 %  Intake/Output Summary (Last 24 hours) at 12/26/18 0820  Last data filed at 12/25/18 1602   Gross per 24 hour   Intake               50 ml   Output                0 ml   Net               50 ml     Invasive Devices     Peripheral Intravenous Line            Peripheral IV 12/25/18 Left Hand less than 1 day                Physical Exam   Constitutional: He is oriented to person, place, and time  He appears well-developed and well-nourished  No distress  HENT:   Head: Normocephalic and atraumatic  Eyes: Pupils are equal, round, and reactive to light  Conjunctivae are normal    Neck: Normal range of motion  Neck supple  Cardiovascular: Normal rate and regular rhythm  Pulmonary/Chest: Effort normal and breath sounds normal  No respiratory distress  Abdominal: Soft  Bowel sounds are normal  He exhibits no distension  There is no tenderness  Musculoskeletal: Normal range of motion  He exhibits no edema  Neurological: He is alert and oriented to person, place, and time  He exhibits normal muscle tone  Skin: Skin is warm and dry  Rash (left foot dressed, right LE stasis changes) noted  He is not diaphoretic  Psychiatric: He has a normal mood and affect  His behavior is normal        The history was obtained from the review of the chart, patient      Lab Results:       Lab Results   Component Value Date    WBC 8 63 12/26/2018    HGB 10 9 (L) 12/26/2018    HCT 34 6 (L) 12/26/2018    MCV 92 12/26/2018     12/26/2018     Lab Results   Component Value Date/Time    BUN 35 (H) 12/26/2018 04:59 AM    BUN 27 (H) 01/23/2018 02:29 PM     01/15/2018 01:35 PM    K 4 3 12/26/2018 04:59 AM    K 4 7 01/23/2018 02:29 PM     12/26/2018 04:59 AM    CL 98 01/23/2018 02:29 PM    CO2 22 12/26/2018 04:59 AM    CO2 29 01/23/2018 02:29 PM    CREATININE 1 34 (H) 12/26/2018 04:59 AM    CREATININE 0 99 01/15/2018 01:35 PM    AST 17 12/26/2018 04:59 AM    AST 13 01/15/2018 01:35 PM    ALT 23 12/26/2018 04:59 AM    ALT 13 01/15/2018 01:35 PM    ALB 2 7 (L) 12/26/2018 04:59 AM    ALB 3 6 01/15/2018 01:35 PM     No results for input(s): CHOL, HDL, LDL, TRIG, VLDL in the last 72 hours  No results found for: Marella Apley  POC Glucose (mg/dl)   Date Value   12/26/2018 170 (H)   12/25/2018 176 (H)   12/25/2018 181 (H)   03/26/2018 126   03/26/2018 248 (H)   03/26/2018 236 (H)   03/25/2018 225 (H)   03/25/2018 232 (H)   03/25/2018 228 (H)   03/25/2018 163 (H)       Imaging Studies: I have personally reviewed pertinent reports  XR foot 3+ views LEFT [[id number]] Collected: 12/25/18 1535   Order Status: Completed Updated: 12/25/18 1541   Narrative:     LEFT FOOT    INDICATION:   4th toe ulcer/infection  COMPARISON:  March 23, 2018  VIEWS:  XR FOOT 3+ VW LEFT       FINDINGS:    There is no acute fracture or dislocation  No destructive osseous lesion is seen  Small calcaneal spurs  Moderate degenerative changes in the intertarsal and tarsometatarsal articulations  The findings may be related to Charcot's arthropathy  Moderate degenerative changes at the first MTP  No lytic or blastic lesions seen  Moderate soft tissue swelling about the dorsum of the foot  Impression:         1  No radiographic findings of osteomyelitis  If there is persistent clinical concern, further evaluation with MRI would be beneficial     2  Moderate diffuse soft tissue swelling about the dorsum of the foot  3  Moderate Lisfranc osteoarthritis, unchanged from the prior exam  This in part may be related to Charcot's arthropathy  Portions of the record may have been created with voice recognition software  Occasional wrong word or "sound a like" substitutions may have occurred due to the inherent limitations of voice recognition software  Read the chart carefully and recognize, using context, where substitutions have occurred

## 2018-12-26 NOTE — PROGRESS NOTES
Progress Note - Oneal Arvizu 79 y o  male MRN: [id number]  Unit/Bed#: [address] [place] [address] [id number] Encounter: [id number]    Assessment:  Principal Problem:    Diabetic ulcer of toe of left foot associated with type 2 diabetes mellitus, limited to breakdown of skin ([facility] [address] )  Active Problems:    Diabetic foot ulcer ([facility] )    Hypertension    Chronic atrial fibrillation ([facility] [address] )    Morbid obesity ([facility] )    Chronic diastolic congestive heart failure (HCC)    GERD (gastroesophageal reflux disease)    Hyperlipidemia  Resolved Problems:    * No resolved hospital problems  *      Plan:  · Diabetic left foot ulcer on 4th toe and left heel/cellulitis  On IV cefepime and vancomycin  Elevate left lower extremity  Patient has arterial and venous duplex of lower extremity pending  Podiatry consult appreciated  Follow-up culture results  Wound care and dressing changes  · Chronic atrial fibrillation and hypertension  On Lopressor, Cozaar and Demadex  Daily weight and I&Os  With Cardiology as outpatient and per records patient has been reluctant to be on anticoagulation  Continue on aspirin  · Diabetes mellitus  Patient is switched to Lantus insulin and Humalog with meals  Accu-Chek a c  HS with Humalog sliding scale  Continue on metformin  Endocrinology consult appreciated  · Morbid obesity-encourage weight loss  · DVT prophylaxis  · Discussed with patient in detail  Subjective:   Patient is seen and examined at bedside  Afebrile  Denies any chest pain, palpitation or any other complaint  All other ROS are negative  Objective:   Vitals: Blood pressure 111/55, pulse 92, temperature 98 7 °F (37 1 °C), temperature source Oral, resp  rate 18, height 6' 3" (1 905 m), weight (!) 170 kg (373 lb 10 9 oz), SpO2 92 %  ,Body mass index is 46 71 kg/m²    SPO2 RA Rest      ED to Hosp-Admission (Current) from 12/25/2018 in [facility]   SpO2  92 %   SpO2 Activity  At Rest   O2 Device  None (Room air) O2 Flow Rate          I&O:   Intake/Output Summary (Last 24 hours) at 12/26/18 1023  Last data filed at 12/25/18 1602   Gross per 24 hour   Intake               50 ml   Output                0 ml   Net               50 ml       Physical Exam:    General- Alert, lying comfortably in bed  Not in any acute distress  HEENT- JENNIE, EOM intact  Neck- Supple, No JVD  CVS- regular, S1 and S2 normal   Chest- Bilateral Air entry, No rhochi, crackles or wheezing present  Abdomen- soft, nontender, not distended, no guarding or rigidity, BS+  Extremities-  No pedal edema, No calf tenderness  Left foot in a dressing  CNS-   Alert, awake and orientedx3  No focal deficits present      Invasive Devices     Peripheral Intravenous Line            Peripheral IV 12/25/18 Left Hand less than 1 day                      Social History  reviewed  Family History   Problem Relation Age of Onset    Other Mother         CARDIAC DISORDER     Diabetes Mother     Cancer Father     Other Family         CARDIAC DISORDER     Diabetes Family     Cancer Family     Mental illness Family     reviewed    Meds:  Current Facility-Administered Medications   Medication Dose Route Frequency Provider Last Rate Last Dose    acetaminophen (TYLENOL) tablet 650 mg  650 mg Oral Q6H PRN Kesha Isaacs MD        albuterol (PROVENTIL HFA,VENTOLIN HFA) inhaler 2 puff  2 puff Inhalation Q6H PRN Kesha Isaacs MD        allopurinol (ZYLOPRIM) tablet 300 mg  300 mg Oral Daily Kesha Isaacs MD   300 mg at 12/26/18 0855    aspirin chewable tablet 81 mg  81 mg Oral Daily Kesha Isaacs MD   81 mg at 12/26/18 0855    cefepime (MAXIPIME) 2 g/50 mL dextrose IVPB  2,000 mg Intravenous Q12H Kesha Isaacs  mL/hr at 12/26/18 0442 2,000 mg at 12/26/18 0442    fluticasone-vilanterol (BREO ELLIPTA) 100-25 mcg/inh inhaler 1 puff  1 puff Inhalation Daily Kesha Isaacs MD   1 puff at 12/26/18 0834    heparin (porcine) subcutaneous injection 5,000 Units  5,000 Units Subcutaneous [facility] Kesha Isaacs MD   5,000 Units at 12/26/18 0550    insulin glargine (LANTUS) subcutaneous injection 50 Units 0 5 mL  50 Units Subcutaneous HS Geoffrey Sher DO        insulin lispro (HumaLOG) 100 units/mL subcutaneous injection 2-12 Units  2-12 Units Subcutaneous TID AC Kesha Isaacs MD   2 Units at 12/26/18 0857    insulin lispro (HumaLOG) 100 units/mL subcutaneous injection 2-12 Units  2-12 Units Subcutaneous HS Gabriel Montemayor PA-C   2 Units at 12/25/18 2148    insulin lispro (HumaLOG) 100 units/mL subcutaneous injection 20 Units  20 Units Subcutaneous TID With Meals Geoffrey Sher DO   20 Units at 12/26/18 0857    losartan (COZAAR) tablet 50 mg  50 mg Oral Daily Kesha Isaacs MD   50 mg at 12/26/18 0855    metoprolol tartrate (LOPRESSOR) tablet 25 mg  25 mg Oral Q12H Kesha Isaacs MD   25 mg at 12/26/18 0550    ondansetron (ZOFRAN) injection 4 mg  4 mg Intravenous Q6H PRN Kesha Isaacs MD        sodium chloride 0 9 % infusion  100 mL/hr Intravenous Continuous Kesha Isaacs  mL/hr at 12/25/18 2147 100 mL/hr at 12/25/18 2147    torsemide (DEMADEX) tablet 20 mg  20 mg Oral BID Kesha Isaacs MD   20 mg at 12/26/18 0855    vancomycin (VANCOCIN) 2,000 mg in sodium chloride 0 9 % 500 mL IVPB  2,000 mg Intravenous Q12H Kesha Isaacs  mL/hr at 12/26/18 0633 2,000 mg at 12/26/18 2744      Prescriptions Prior to Admission   Medication    albuterol (PROVENTIL HFA,VENTOLIN HFA) 90 mcg/act inhaler    allopurinol (ZYLOPRIM) 300 mg tablet    aspirin 81 mg chewable tablet    fluticasone-vilanterol (BREO ELLIPTA) 100-25 mcg/inh inhaler    insulin regular (NOVOLIN R RELION) 100 units/mL injection    LEVEMIR FLEXTOUCH 100 units/mL injection pen    losartan (COZAAR) 50 mg tablet    metFORMIN (GLUCOPHAGE) 1000 MG tablet    metoprolol tartrate (LOPRESSOR) 25 mg tablet    Nutritional Supplements (PROSOURCE NO CARB) LIQD    spironolactone (ALDACTONE) 25 mg tablet    torsemide (DEMADEX) 20 mg tablet       Labs:    Results from last 7 days  Lab Units 12/26/18  0459 12/25/18  1441   WBC Thousand/uL 8 63 7 48   HEMOGLOBIN g/dL 10 9* 11 5*   HEMATOCRIT % 34 6* 36 5   PLATELETS Thousands/uL 201 196   NEUTROS PCT % 71 68   LYMPHS PCT % 16 18   MONOS PCT % 10 10   EOS PCT % 2 3       Results from last 7 days  Lab Units 12/26/18  0459 12/25/18  1441   POTASSIUM mmol/L 4 3 4 5   CHLORIDE mmol/L 101 101   CO2 mmol/L 22 26   BUN mg/dL 35* 37*   CREATININE mg/dL 1 34* 1 19   CALCIUM mg/dL 8 4 8 6   ALK PHOS U/L 159* 167*   ALT U/L 23 26   AST U/L 17 26     Lab Results   Component Value Date    TROPONINI <0 02 03/23/2018    TROPONINI <0 02 10/08/2017    CKTOTAL 53 03/23/2018    CKTOTAL 55 10/08/2017         Lab Results   Component Value Date    BLOODCX No Growth After 5 Days  03/23/2018    BLOODCX No Growth After 5 Days  03/23/2018    BLOODCX No Growth After 5 Days  10/08/2017    BLOODCX No Growth After 5 Days  10/08/2017    WOUNDCULT 4+ Growth of Staphylococcus aureus 09/15/2016    WOUNDCULT 4+ Growth of Proteus mirabilis 09/15/2016    WOUNDCULT 4+ Growth of Mixed Skin Nini 09/15/2016         Imaging:  Results for orders placed during the hospital encounter of 03/23/18   XR chest 1 view portable    Narrative CHEST     INDICATION:   foot ulcer  COMPARISON:  October 8, 2017    EXAM PERFORMED/VIEWS:  XR CHEST PORTABLE  1 image    FINDINGS:    Heart enlarged  Pulmonary vessels unremarkable  Lungs and pleural spaces clear  Osseous structures appear within normal limits for patient age  Impression No acute abnormality in the chest         Workstation performed: [id number]       Results for orders placed during the hospital encounter of 10/08/17   XR chest 2 views    Narrative CHEST 2 View    INDICATION:  Chest pain  Arm infection      COMPARISON:  12/21/2015    VIEWS:  PA and lateral projections; 3 images    FINDINGS: Heart shadow is enlarged but stable from prior exam    Persistent bibasilar patchy atelectasis  Visualized osseous structures appear within normal limits for the patient's age  Impression Stable cardiomegaly  Patchy bibasilar atelectasis  Workstation performed: [id number]         Labs & Imaging: I have personally reviewed pertinent reports        VTE Pharmacologic Prophylaxis: Heparin  VTE Mechanical Prophylaxis: sequential compression device    Code Status:   Level 1 - Full Code      "This note has been constructed using a voice recognition system"      Josephine Lovett MD  12/26/2018,10:23 AM

## 2018-12-26 NOTE — SOCIAL WORK
Met with Pt  Pt presents AA&Ox3  Discussed role of   [place] [place] [place] [facility] [place] [address]  Pt is independent with adls and ambulation  Pt has walker and wheelchair  Pt's family and significant other does grocery shopping  Pt drives  Pt goes to [facility] in [facility]  Pt has been to [facility] last October and was at [address] two years ago  Pt has [facility] for SN  Referral sent to [facility] for SN  Await PT/OT eval  Will follow

## 2018-12-27 ENCOUNTER — ANESTHESIA EVENT (INPATIENT)
Dept: PERIOP | Facility: HOSPITAL | Age: 68
DRG: 617 | End: 2018-12-27
Payer: COMMERCIAL

## 2018-12-27 ENCOUNTER — ANESTHESIA (INPATIENT)
Dept: PERIOP | Facility: HOSPITAL | Age: 68
DRG: 617 | End: 2018-12-27
Payer: COMMERCIAL

## 2018-12-27 ENCOUNTER — APPOINTMENT (INPATIENT)
Dept: RADIOLOGY | Facility: HOSPITAL | Age: 68
DRG: 617 | End: 2018-12-27
Payer: COMMERCIAL

## 2018-12-27 LAB
ANION GAP SERPL CALCULATED.3IONS-SCNC: 11 MMOL/L (ref 4–13)
BASOPHILS # BLD AUTO: 0.04 THOUSANDS/ΜL (ref 0–0.1)
BASOPHILS NFR BLD AUTO: 1 % (ref 0–1)
BUN SERPL-MCNC: 42 MG/DL (ref 5–25)
CALCIUM SERPL-MCNC: 8.1 MG/DL (ref 8.3–10.1)
CHLORIDE SERPL-SCNC: 102 MMOL/L (ref 100–108)
CO2 SERPL-SCNC: 23 MMOL/L (ref 21–32)
CREAT SERPL-MCNC: 1.35 MG/DL (ref 0.6–1.3)
EOSINOPHIL # BLD AUTO: 0.2 THOUSAND/ΜL (ref 0–0.61)
EOSINOPHIL NFR BLD AUTO: 3 % (ref 0–6)
ERYTHROCYTE [DISTWIDTH] IN BLOOD BY AUTOMATED COUNT: 13.8 % (ref 11.6–15.1)
EST. AVERAGE GLUCOSE BLD GHB EST-MCNC: 223 MG/DL
GFR SERPL CREATININE-BSD FRML MDRD: 54 ML/MIN/1.73SQ M
GLUCOSE SERPL-MCNC: 118 MG/DL (ref 65–140)
GLUCOSE SERPL-MCNC: 127 MG/DL (ref 65–140)
GLUCOSE SERPL-MCNC: 140 MG/DL (ref 65–140)
GLUCOSE SERPL-MCNC: 143 MG/DL (ref 65–140)
GLUCOSE SERPL-MCNC: 144 MG/DL (ref 65–140)
GLUCOSE SERPL-MCNC: 169 MG/DL (ref 65–140)
HBA1C MFR BLD: 9.4 % (ref 4.2–6.3)
HCT VFR BLD AUTO: 35.1 % (ref 36.5–49.3)
HGB BLD-MCNC: 11.1 G/DL (ref 12–17)
IMM GRANULOCYTES # BLD AUTO: 0.07 THOUSAND/UL (ref 0–0.2)
IMM GRANULOCYTES NFR BLD AUTO: 1 % (ref 0–2)
LYMPHOCYTES # BLD AUTO: 1.36 THOUSANDS/ΜL (ref 0.6–4.47)
LYMPHOCYTES NFR BLD AUTO: 18 % (ref 14–44)
MCH RBC QN AUTO: 28.8 PG (ref 26.8–34.3)
MCHC RBC AUTO-ENTMCNC: 31.6 G/DL (ref 31.4–37.4)
MCV RBC AUTO: 91 FL (ref 82–98)
MONOCYTES # BLD AUTO: 0.7 THOUSAND/ΜL (ref 0.17–1.22)
MONOCYTES NFR BLD AUTO: 9 % (ref 4–12)
NEUTROPHILS # BLD AUTO: 5.05 THOUSANDS/ΜL (ref 1.85–7.62)
NEUTS SEG NFR BLD AUTO: 68 % (ref 43–75)
NRBC BLD AUTO-RTO: 0 /100 WBCS
PLATELET # BLD AUTO: 201 THOUSANDS/UL (ref 149–390)
PMV BLD AUTO: 9.8 FL (ref 8.9–12.7)
POTASSIUM SERPL-SCNC: 3.9 MMOL/L (ref 3.5–5.3)
RBC # BLD AUTO: 3.85 MILLION/UL (ref 3.88–5.62)
SODIUM SERPL-SCNC: 136 MMOL/L (ref 136–145)
VANCOMYCIN TROUGH SERPL-MCNC: 20.9 UG/ML (ref 10–20)
WBC # BLD AUTO: 7.42 THOUSAND/UL (ref 4.31–10.16)

## 2018-12-27 PROCEDURE — 87176 TISSUE HOMOGENIZATION CULTR: CPT | Performed by: PODIATRIST

## 2018-12-27 PROCEDURE — 73630 X-RAY EXAM OF FOOT: CPT

## 2018-12-27 PROCEDURE — 88300 SURGICAL PATH GROSS: CPT | Performed by: PATHOLOGY

## 2018-12-27 PROCEDURE — 87075 CULTR BACTERIA EXCEPT BLOOD: CPT | Performed by: PODIATRIST

## 2018-12-27 PROCEDURE — 87070 CULTURE OTHR SPECIMN AEROBIC: CPT | Performed by: PODIATRIST

## 2018-12-27 PROCEDURE — 87186 SC STD MICRODIL/AGAR DIL: CPT | Performed by: PODIATRIST

## 2018-12-27 PROCEDURE — 87147 CULTURE TYPE IMMUNOLOGIC: CPT | Performed by: PODIATRIST

## 2018-12-27 PROCEDURE — 85025 COMPLETE CBC W/AUTO DIFF WBC: CPT | Performed by: INTERNAL MEDICINE

## 2018-12-27 PROCEDURE — 94760 N-INVAS EAR/PLS OXIMETRY 1: CPT

## 2018-12-27 PROCEDURE — 80048 BASIC METABOLIC PNL TOTAL CA: CPT | Performed by: INTERNAL MEDICINE

## 2018-12-27 PROCEDURE — 87205 SMEAR GRAM STAIN: CPT | Performed by: PODIATRIST

## 2018-12-27 PROCEDURE — 82948 REAGENT STRIP/BLOOD GLUCOSE: CPT

## 2018-12-27 PROCEDURE — 94640 AIRWAY INHALATION TREATMENT: CPT

## 2018-12-27 PROCEDURE — 83036 HEMOGLOBIN GLYCOSYLATED A1C: CPT | Performed by: INTERNAL MEDICINE

## 2018-12-27 PROCEDURE — 99232 SBSQ HOSP IP/OBS MODERATE 35: CPT | Performed by: INTERNAL MEDICINE

## 2018-12-27 PROCEDURE — 86803 HEPATITIS C AB TEST: CPT | Performed by: INTERNAL MEDICINE

## 2018-12-27 PROCEDURE — 0Y6W0Z0 DETACHMENT AT LEFT 4TH TOE, COMPLETE, OPEN APPROACH: ICD-10-PCS | Performed by: PODIATRIST

## 2018-12-27 PROCEDURE — 80202 ASSAY OF VANCOMYCIN: CPT | Performed by: INTERNAL MEDICINE

## 2018-12-27 RX ORDER — PROPOFOL 10 MG/ML
INJECTION, EMULSION INTRAVENOUS CONTINUOUS PRN
Status: DISCONTINUED | OUTPATIENT
Start: 2018-12-27 | End: 2018-12-27 | Stop reason: SURG

## 2018-12-27 RX ORDER — FENTANYL CITRATE 50 UG/ML
INJECTION, SOLUTION INTRAMUSCULAR; INTRAVENOUS AS NEEDED
Status: DISCONTINUED | OUTPATIENT
Start: 2018-12-27 | End: 2018-12-27 | Stop reason: SURG

## 2018-12-27 RX ORDER — FENTANYL CITRATE/PF 50 MCG/ML
25 SYRINGE (ML) INJECTION
Status: DISCONTINUED | OUTPATIENT
Start: 2018-12-27 | End: 2018-12-27 | Stop reason: HOSPADM

## 2018-12-27 RX ORDER — MIDAZOLAM HYDROCHLORIDE 1 MG/ML
INJECTION INTRAMUSCULAR; INTRAVENOUS AS NEEDED
Status: DISCONTINUED | OUTPATIENT
Start: 2018-12-27 | End: 2018-12-27 | Stop reason: SURG

## 2018-12-27 RX ORDER — SODIUM CHLORIDE, SODIUM LACTATE, POTASSIUM CHLORIDE, CALCIUM CHLORIDE 600; 310; 30; 20 MG/100ML; MG/100ML; MG/100ML; MG/100ML
INJECTION, SOLUTION INTRAVENOUS CONTINUOUS PRN
Status: DISCONTINUED | OUTPATIENT
Start: 2018-12-27 | End: 2018-12-27 | Stop reason: SURG

## 2018-12-27 RX ADMIN — FENTANYL CITRATE 50 MCG: 50 INJECTION, SOLUTION INTRAMUSCULAR; INTRAVENOUS at 14:15

## 2018-12-27 RX ADMIN — HEPARIN SODIUM 5000 UNITS: 5000 INJECTION INTRAVENOUS; SUBCUTANEOUS at 22:09

## 2018-12-27 RX ADMIN — METOPROLOL TARTRATE 25 MG: 25 TABLET, FILM COATED ORAL at 17:34

## 2018-12-27 RX ADMIN — FENTANYL CITRATE 50 MCG: 50 INJECTION, SOLUTION INTRAMUSCULAR; INTRAVENOUS at 14:10

## 2018-12-27 RX ADMIN — PROPOFOL 20 MCG/KG/MIN: 10 INJECTION, EMULSION INTRAVENOUS at 14:15

## 2018-12-27 RX ADMIN — INSULIN LISPRO 2 UNITS: 100 INJECTION, SOLUTION INTRAVENOUS; SUBCUTANEOUS at 22:09

## 2018-12-27 RX ADMIN — ALBUTEROL SULFATE 2 PUFF: 90 AEROSOL, METERED RESPIRATORY (INHALATION) at 11:04

## 2018-12-27 RX ADMIN — FLUTICASONE FUROATE AND VILANTEROL TRIFENATATE 1 PUFF: 100; 25 POWDER RESPIRATORY (INHALATION) at 11:02

## 2018-12-27 RX ADMIN — METOPROLOL TARTRATE 25 MG: 25 TABLET, FILM COATED ORAL at 05:09

## 2018-12-27 RX ADMIN — ALLOPURINOL 300 MG: 300 TABLET ORAL at 08:47

## 2018-12-27 RX ADMIN — ASPIRIN 81 MG 81 MG: 81 TABLET ORAL at 08:47

## 2018-12-27 RX ADMIN — MIDAZOLAM HYDROCHLORIDE 2 MG: 1 INJECTION, SOLUTION INTRAMUSCULAR; INTRAVENOUS at 14:10

## 2018-12-27 RX ADMIN — INSULIN GLARGINE 50 UNITS: 100 INJECTION, SOLUTION SUBCUTANEOUS at 22:09

## 2018-12-27 RX ADMIN — SODIUM CHLORIDE, SODIUM LACTATE, POTASSIUM CHLORIDE, AND CALCIUM CHLORIDE: .6; .31; .03; .02 INJECTION, SOLUTION INTRAVENOUS at 14:10

## 2018-12-27 RX ADMIN — CEFEPIME HYDROCHLORIDE 2000 MG: 2 INJECTION, SOLUTION INTRAVENOUS at 05:09

## 2018-12-27 RX ADMIN — INSULIN LISPRO 20 UNITS: 100 INJECTION, SOLUTION INTRAVENOUS; SUBCUTANEOUS at 17:39

## 2018-12-27 RX ADMIN — CEFEPIME HYDROCHLORIDE 2000 MG: 2 INJECTION, SOLUTION INTRAVENOUS at 17:34

## 2018-12-27 RX ADMIN — TORSEMIDE 20 MG: 20 TABLET ORAL at 17:34

## 2018-12-27 RX ADMIN — VANCOMYCIN HYDROCHLORIDE 2000 MG: 1 INJECTION, POWDER, LYOPHILIZED, FOR SOLUTION INTRAVENOUS at 23:56

## 2018-12-27 NOTE — OP NOTE
OPERATIVE REPORT  PATIENT NAME: Carlo Rogers    :  1950  MRN: [id number]  Pt Location: [facility] OR ROOM 03    SURGERY DATE: 2018    Surgeon(s) and Role:     * Rupa Castañeda DPM - Primary     * Moreno Pardo DPM - Assisting    Preop Diagnosis:  Diabetic ulcer of toe of left foot associated with type 2 diabetes mellitus, limited to breakdown of skin ([address] ) [J54 974, L97 521]    Post-Op Diagnosis Codes:     * Diabetic ulcer of toe of left foot associated with type 2 diabetes mellitus, limited to breakdown of skin ([address] ) [T52 739, L97 521]    Procedure(s) (LRB):  AMPUTATION left 4th TOE (Left)    Specimen(s):  ID Type Source Tests Collected by Time Destination   1 : Left 4th Toe Amputation  Tissue Toe, Left TISSUE EXAM Rupa Castañeda DPM 2018 1431    A : Tissue Culture Tissue Toe, Left ANAEROBIC CULTURE AND GRAM STAIN, CULTURE, TISSUE AND GRAM STAIN Rupa Castañeda DPM 2018 1439        Estimated Blood Loss:   Minimal    Drains:       Anesthesia Type:   IV Sedation with Anesthesia    Operative Indications:  Diabetic ulcer of toe of left foot associated with type 2 diabetes mellitus, limited to breakdown of skin ([address] ) [G76 151, L97 521]      Operative Findings:  1) Consistent with diagnosis, purulence drainage at distal aspect of 4th digit  2) Adequate bleeding noted, remaining soft tissue and bone appeared to be healthy and viable, deep tissue cultures obtained    Complications:   None    Procedure and Technique:  Under mild sedation patient was brought into operating transferred to operating table in a supine position  IV sedation was performed and 10 cc of 1 1 mixture 1% lidocaine plain and 0 5% Marcaine plain was injected into the left foot 4th digit block fashion  The left foot was then prepped and draped in usual aseptic manner  A dorsal time-out was then performed were all parties in agreement correct patient, correct procedure and correct site      Attention was then directed to the 4th digit where was noted to be necrotic with purulent drainage  Using 15 blade the entire 4th digit was amputated at the metatarsophalangeal joint  There was adequate amount of bleeding noted, all remaining soft tissue and bone appeared to be viable and healthy in appearance  The wound was then flushed with copious amounts normal saline  The skin was then reapproximated with 3-0 nylon  Patient was then transferred to the PACU with vital signs stable  As with many limb salvage procedures, we contemplate the possibility of performing further stages to this procedure  Procedures may include debridements, delayed closure, plastic surgery techniques, or more proximal amputations  This procedure may be considered part of a multi-staged limb salvage treatment plan        I was present for the entire procedure    Patient Disposition:  PACU  and hemodynamically stable    SIGNATURE: Annie Curtis DPM  DATE: December 27, 2018  TIME: 2:50 PM

## 2018-12-27 NOTE — H&P (VIEW-ONLY)
Consult - Podiatry   [facility] Sportss[facility]ana Cummins 79 y o  male MRN: [id number]  Unit/Bed#: [address] [place] [address] [id number] Encounter: [id number]    Assessment/Plan     Assessment:    1  Diabetic peripheral neuropathy of lower extremities  2  Diabetic foot ulcer distal 4th toe Left foot (Stage IV)  3  Diabetic foot ulcer plantar heel Left foot (Stage I)  4  PAD    Plan:  1  LEADS (originally ordered by Dr Ej Gross Doctor in June but not completed)  2  Probable toe amputation pending #1  3  IVABX    History of Present Illness     HPI:  Luis Driscoll is a 79 y o  male who presents with a new draining ulcer tip of the 4th toe of unknown origin - though the patient repeatedly states he thinks he bumped it on a chair at home  The toe was noted (odor, drainage, discoloration) by his [facility] nurses which were currently seeing him for the pre-existing heel ulcer; and he was sent in through the ER  He has been an oupatient at the wound center seeing primarily Dr Alan Gustafson for the heel  Consults  Review of Systems   Constitutional: Negative  HENT: Negative  Eyes: Negative  Respiratory: Negative  Cardiovascular: Negative  Gastrointestinal: Negative  Musculoskeletal: Negative   Skin:Mr Cummins is aware of both wounds   Neurological: Negative  Psych: negative         Historical Information   Past Medical History:   Diagnosis Date    [facility]est[facility] failure) [facility]     COPD (chronic obstructive pulmonary disease) ([facility])     Decubitus ulcer of heel     LAST ASSESSED [id number]    Diabetes mellitus ([facility] )     History of varicose veins     Hypertension     Intermittent claudication ([facility])     Neuropathy     Seasonal allergies      Past Surgical History:   Procedure Laterality Date    ANGIOPLASTY      W/ FLUOROSC ANGIOGRAPGY PERIPHERAL ARTERY ADDITIONAL  LAST ASSESSED [id number]    ANGIOPLASTY / STENTING FEMORAL      TANSCATH INTRAVASCULAR STENT PLACEMENT PERCUTANEOUS FEMORAL     FULL THICKNESS SKIN GRAFT      TENDON REPAIR       Social History   History   Alcohol Use No     History   Drug Use No     History   Smoking Status    Never Smoker   Smokeless Tobacco    Never Used     Family History:   Family History   Problem Relation Age of Onset    Other Mother         CARDIAC DISORDER     Diabetes Mother     Cancer Father     Other Family         CARDIAC DISORDER     Diabetes Family     Cancer Family     Mental illness Family        Meds/Allergies   Prescriptions Prior to Admission   Medication    albuterol (PROVENTIL HFA,VENTOLIN HFA) 90 mcg/act inhaler    allopurinol (ZYLOPRIM) 300 mg tablet    aspirin 81 mg chewable tablet    fluticasone-vilanterol (BREO ELLIPTA) 100-25 mcg/inh inhaler    insulin regular (NOVOLIN R RELION) 100 units/mL injection    LEVEMIR FLEXTOUCH 100 units/mL injection pen    losartan (COZAAR) 50 mg tablet    metFORMIN (GLUCOPHAGE) 1000 MG tablet    metoprolol tartrate (LOPRESSOR) 25 mg tablet    Nutritional Supplements (PROSOURCE NO CARB) LIQD    spironolactone (ALDACTONE) 25 mg tablet    torsemide (DEMADEX) 20 mg tablet     Allergies   Allergen Reactions    Latex Rash       Objective   First Vitals:   Blood Pressure: 145/84 (12/25/18 1411)  Pulse: 78 (12/25/18 1411)  Temperature: 98 1 °F (36 7 °C) (12/25/18 1411)  Temp Source: Tympanic (12/25/18 1411)  Respirations: 20 (12/25/18 1411)  Height: 6' 3" (190 5 cm) (12/25/18 1712)  Weight - Scale: (!) 164 kg (361 lb 8 9 oz) (12/25/18 1411)  SpO2: 98 % (12/25/18 1411)    Current Vitals:   Blood Pressure: 111/55 (12/26/18 0700)  Pulse: 92 (12/26/18 0700)  Temperature: 98 7 °F (37 1 °C) (12/26/18 0700)  Temp Source: Oral (12/26/18 0700)  Respirations: 18 (12/26/18 0700)  Height: 6' 3" (190 5 cm) (12/25/18 1712)  Weight - Scale: (!) 170 kg (373 lb 10 9 oz) (12/26/18 0700)  SpO2: 92 % (12/26/18 0700)        /55 (BP Location: Right arm)   Pulse 92   Temp 98 7 °F (37 1 °C) (Oral)   Resp 18   Ht 6' 3" (1 905 m)   Wt (!) 170 kg (373 lb 10 9 oz)   SpO2 92%   BMI 46 71 kg/m²      General Appearance:    Alert, cooperative, no distress   Head:    Normocephalic, without obvious abnormality, atraumatic   Eyes:    PERRL, conjunctiva/corneas clear, EOM's intact        Nose:   Moist mucous membranes   Neck:   Supple, symmetrical, trachea midline   Back:     Symmetric   Lungs:     Respirations unlabored   Heart:    Regular rate and rhythm, S1 and S2 normal, no murmur, rub   or gallop   Abdomen:     Soft, non-tender   Extremities:   Edema noted bilateral lower extremities   Pulses:   Difficult to palpate due to the chronic edema; his legs have chronic lipodermatosclerosis and hemosiderin pigmentation consistent with venous stasis as well  Skin:   #1  Distal Left 4th toe is grayish black, malodorous with serous drainage  The distal 1/3 of the toe is nonviable  There is an ulcer which I can't probe directly to bone but it measures about 1 3cm in diameter and  4cm deep  #2  A residual plantar Left heel ulcer is present which is mostly now just dried keratotic tissue without drainage  Neurologic:   Gross sensation is intact  Protective sensation is absent             Lab Results:   Admission on 12/25/2018   Component Date Value    WBC 12/25/2018 7 48     RBC 12/25/2018 3 97     Hemoglobin 12/25/2018 11 5*    Hematocrit 12/25/2018 36 5     MCV 12/25/2018 92     MCH 12/25/2018 29 0     MCHC 12/25/2018 31 5     RDW 12/25/2018 13 7     MPV 12/25/2018 9 8     Platelets 53/36/0396 196     nRBC 12/25/2018 0     Neutrophils Relative 12/25/2018 68     Immat GRANS % 12/25/2018 1     Lymphocytes Relative 12/25/2018 18     Monocytes Relative 12/25/2018 10     Eosinophils Relative 12/25/2018 3     Basophils Relative 12/25/2018 0     Neutrophils Absolute 12/25/2018 5 13     Immature Grans Absolute 12/25/2018 0 04     Lymphocytes Absolute 12/25/2018 1 34     Monocytes Absolute 12/25/2018 0 73     Eosinophils Absolute 12/25/2018 0 21     Basophils Absolute 12/25/2018 0 03     Sodium 12/25/2018 138     Potassium 12/25/2018 4 5     Chloride 12/25/2018 101     CO2 12/25/2018 26     ANION GAP 12/25/2018 11     BUN 12/25/2018 37*    Creatinine 12/25/2018 1 19     Glucose 12/25/2018 190*    Calcium 12/25/2018 8 6     AST 12/25/2018 26     ALT 12/25/2018 26     Alkaline Phosphatase 12/25/2018 167*    Total Protein 12/25/2018 7 5     Albumin 12/25/2018 2 9*    Total Bilirubin 12/25/2018 0 60     eGFR 12/25/2018 63     LACTIC ACID 12/25/2018 1 7     Sed Rate 12/25/2018 63*    CRP 12/25/2018 76 4*    POC Glucose 12/25/2018 181*    POC Glucose 12/25/2018 176*    Sodium 12/26/2018 137     Potassium 12/26/2018 4 3     Chloride 12/26/2018 101     CO2 12/26/2018 22     ANION GAP 12/26/2018 14*    BUN 12/26/2018 35*    Creatinine 12/26/2018 1 34*    Glucose 12/26/2018 133     Calcium 12/26/2018 8 4     AST 12/26/2018 17     ALT 12/26/2018 23     Alkaline Phosphatase 12/26/2018 159*    Total Protein 12/26/2018 7 1     Albumin 12/26/2018 2 7*    Total Bilirubin 12/26/2018 0 80     eGFR 12/26/2018 55     Magnesium 12/26/2018 1 4*    Phosphorus 12/26/2018 3 9     WBC 12/26/2018 8 63     RBC 12/26/2018 3 78*    Hemoglobin 12/26/2018 10 9*    Hematocrit 12/26/2018 34 6*    MCV 12/26/2018 92     MCH 12/26/2018 28 8     MCHC 12/26/2018 31 5     RDW 12/26/2018 13 8     MPV 12/26/2018 10 1     Platelets 71/01/8697 201     nRBC 12/26/2018 0     Neutrophils Relative 12/26/2018 71     Immat GRANS % 12/26/2018 1     Lymphocytes Relative 12/26/2018 16     Monocytes Relative 12/26/2018 10     Eosinophils Relative 12/26/2018 2     Basophils Relative 12/26/2018 0     Neutrophils Absolute 12/26/2018 6 19     Immature Grans Absolute 12/26/2018 0 05     Lymphocytes Absolute 12/26/2018 1 34     Monocytes Absolute 12/26/2018 0 84     Eosinophils Absolute 12/26/2018 0 18     Basophils Absolute 12/26/2018 0 03     Ventricular Rate 12/25/2018 65     Atrial Rate 12/25/2018 394     QRSD Interval 12/25/2018 110     QT Interval 12/25/2018 434     QTC Interval 12/25/2018 451     QRS Axis 12/25/2018 64     T Wave Axis 12/25/2018 -6     Ventricular Rate 12/25/2018 66     Atrial Rate 12/25/2018 83     QRSD Interval 12/25/2018 110     QT Interval 12/25/2018 436     QTC Interval 12/25/2018 457     QRS Axis 12/25/2018 69     T Wave Axis 12/25/2018 -5     POC Glucose 12/26/2018 170*                   Imaging: I have personally reviewed pertinent films in PACS  EKG, Pathology, and Other Studies: I have personally reviewed pertinent reports

## 2018-12-27 NOTE — PROGRESS NOTES
Vanco trough for patient came back a critical high  Per the doctor on the trough order, decreasing Vanco dose frequency to q24  Dr Mitzi Lopez notified

## 2018-12-27 NOTE — ANESTHESIA PREPROCEDURE EVALUATION
Review of Systems/Medical History  Patient summary reviewed  Chart reviewed      Cardiovascular  Hyperlipidemia, Hypertension poorly controlled, Dysrhythmias , atrial fibrillation, CHF uncompensated CHF, CROCKER,   Comment: cardiomyopathy,  Pulmonary  COPD moderate- medication dependent ,        GI/Hepatic    GERD well controlled,        Chronic kidney disease stage 2,        Endo/Other  Diabetes poorly controlled type 1 Insulin,   Obesity  morbid obesity   GYN  Negative gynecology ROS          Hematology  Negative hematology ROS      Musculoskeletal  Gout,        Neurology    Diabetic neuropathy,    Psychology   Negative psychology ROS              Physical Exam    Airway    Mallampati score: II  TM Distance: >3 FB  Neck ROM: full     Dental   No notable dental hx     Cardiovascular  Rhythm: irregular, Rate: normal,     Pulmonary  Breath sounds clear to auscultation,     Other Findings        Anesthesia Plan  ASA Score- 4     Anesthesia Type- IV sedation with anesthesia with ASA Monitors  Additional Monitors:   Airway Plan:         Plan Factors-    Induction- intravenous  Postoperative Plan- Plan for postoperative opioid use  Informed Consent- Anesthetic plan and risks discussed with patient  I personally reviewed this patient with the CRNA  Discussed and agreed on the Anesthesia Plan with the COMFORT Krishnan

## 2018-12-27 NOTE — PROGRESS NOTES
Progress Note - Lorenzo Gonzalez 79 y o  male MRN: [id number]  Unit/Bed#: [address] [place] [address] [id number] Encounter: [id number]    Assessment:  Principal Problem:    Diabetic ulcer of toe of left foot associated with type 2 diabetes mellitus, limited to breakdown of skin ([facility] )  Active Problems:    Diabetic foot ulcer ([facility] )    Hypertension    Chronic atrial fibrillation ([facility] )    Morbid obesity ([facility] [facility] )    Chronic diastolic congestive heart failure (HCC)    GERD (gastroesophageal reflux disease)    Hyperlipidemia  Resolved Problems:    * No resolved hospital problems  *      Plan:  · Diabetic left foot ulcer on 4th toe and left heel/cellulitis  On IV cefepime and vancomycin  Elevate left lower extremity  Venous duplex showed chronic nonocclusive thrombus  Podiatry is waiting for arterial Doppler report and will plan for possible toe amputation  Follow-up culture results  Wound care and dressing changes  Patient will need anticoagulation once cleared by Podiatry  · Chronic atrial fibrillation and hypertension  On Lopressor, Cozaar and Demadex  Daily weight and I&Os  With Cardiology as outpatient and per records patient has been reluctant to be on anticoagulation  Continue on aspirin  · Diabetes mellitus  Continue on Lantus insulin Humalog with meals  Accu-Chek a c  HS with Humalog sliding scale  Continue on metformin  · Morbid obesity-encourage weight loss  · DVT prophylaxis  · Discussed with patient in detail  Subjective:   Patient is seen and examined at bedside  Denies any new complaints  Afebrile  All other ROS are negative  Objective:   Vitals: Blood pressure 109/52, pulse 70, temperature 98 7 °F (37 1 °C), temperature source Oral, resp  rate 20, height 6' 3" (1 905 m), weight (!) 164 kg (361 lb 8 9 oz), SpO2 90 %  ,Body mass index is 45 19 kg/m²    SPO2 RA Rest      ED to Hosp-Admission (Current) from 12/25/2018 in [facility]   SpO2  90 %   SpO2 Activity  At Rest   O2 Device  None (Room air)   O2 Flow Rate          I&O:   Intake/Output Summary (Last 24 hours) at 12/27/18 0929  Last data filed at 12/27/18 0901   Gross per 24 hour   Intake              500 ml   Output             1351 ml   Net             -851 ml       Physical Exam:    General- Alert, lying comfortably in bed  Not in any acute distress  HEENT- JENNIE, EOM intact  Neck- Supple, No JVD  CVS- regular, S1 and S2 normal   Chest- Bilateral Air entry, No rhochi, crackles or wheezing present  Abdomen- soft, nontender, not distended, no guarding or rigidity, BS+  Extremities-  No pedal edema, No calf tenderness  Left foot in a dressing  CNS-   Alert, awake and orientedx3  No focal deficits present      Invasive Devices     Peripheral Intravenous Line            Peripheral IV 12/25/18 Left Hand 1 day                      Social History  reviewed  Family History   Problem Relation Age of Onset    Other Mother         CARDIAC DISORDER     Diabetes Mother     Cancer Father     Other Family         CARDIAC DISORDER     Diabetes Family     Cancer Family     Mental illness Family     reviewed    Meds:  Current Facility-Administered Medications   Medication Dose Route Frequency Provider Last Rate Last Dose    acetaminophen (TYLENOL) tablet 650 mg  650 mg Oral Q6H PRN Daiana Soto MD        albuterol (PROVENTIL HFA,VENTOLIN HFA) inhaler 2 puff  2 puff Inhalation Q6H PRN Daiana Soto MD        allopurinol (ZYLOPRIM) tablet 300 mg  300 mg Oral Daily Daiana Soto MD   300 mg at 12/27/18 0847    aspirin chewable tablet 81 mg  81 mg Oral Daily Daiana Soto MD   81 mg at 12/27/18 0847    cefepime (MAXIPIME) 2 g/50 mL dextrose IVPB  2,000 mg Intravenous Q12H Daiana Soto  mL/hr at 12/27/18 0509 2,000 mg at 12/27/18 0509    fluticasone-vilanterol (BREO ELLIPTA) 100-25 mcg/inh inhaler 1 puff  1 puff Inhalation Daily Daiana Soto MD   1 puff at 12/26/18 0834    heparin (porcine) subcutaneous injection 5,000 Units  5,000 Units Subcutaneous [facility] Kim Tyler MD   5,000 Units at 12/26/18 2202    insulin glargine (LANTUS) subcutaneous injection 50 Units 0 5 mL  50 Units Subcutaneous HS Ondina Underwood DO   50 Units at 12/26/18 2201    insulin lispro (HumaLOG) 100 units/mL subcutaneous injection 2-12 Units  2-12 Units Subcutaneous TID [facility] Kim Tyler MD   2 Units at 12/26/18 0857    insulin lispro (HumaLOG) 100 units/mL subcutaneous injection 2-12 Units  2-12 Units Subcutaneous HS Alfonzo Toure PA-C   2 Units at 12/26/18 2203    insulin lispro (HumaLOG) 100 units/mL subcutaneous injection 20 Units  20 Units Subcutaneous TID With Meals Ondina Underwood DO   Stopped at 12/27/18 0848    losartan (COZAAR) tablet 50 mg  50 mg Oral Daily Kim Tyler MD   50 mg at 12/26/18 0855    metoprolol tartrate (LOPRESSOR) tablet 25 mg  25 mg Oral Q12H Kim Tyler MD   25 mg at 12/27/18 0509    ondansetron (ZOFRAN) injection 4 mg  4 mg Intravenous Q6H PRN Kim Tyler MD        torsemide (DEMADEX) tablet 20 mg  20 mg Oral BID Kim Tyler MD   20 mg at 12/26/18 1718    vancomycin (VANCOCIN) 2,000 mg in sodium chloride 0 9 % 500 mL IVPB  2,000 mg Intravenous Q12H Kim Tyler MD   Stopped at 12/27/18 0140      Prescriptions Prior to Admission   Medication    albuterol (PROVENTIL HFA,VENTOLIN HFA) 90 mcg/act inhaler    allopurinol (ZYLOPRIM) 300 mg tablet    aspirin 81 mg chewable tablet    fluticasone-vilanterol (BREO ELLIPTA) 100-25 mcg/inh inhaler    insulin regular (NOVOLIN R RELION) 100 units/mL injection    LEVEMIR FLEXTOUCH 100 units/mL injection pen    losartan (COZAAR) 50 mg tablet    metFORMIN (GLUCOPHAGE) 1000 MG tablet    metoprolol tartrate (LOPRESSOR) 25 mg tablet    Nutritional Supplements (PROSOURCE NO CARB) LIQD    spironolactone (ALDACTONE) 25 mg tablet    torsemide (DEMADEX) 20 mg tablet       Labs:    Results from last 7 days  Lab Units 12/27/18  0434 12/26/18  0459 12/25/18  1441   WBC Thousand/uL 7 42 8 63 7 48   HEMOGLOBIN g/dL 11 1* 10 9* 11 5*   HEMATOCRIT % 35 1* 34 6* 36 5   PLATELETS Thousands/uL 201 201 196   NEUTROS PCT % 68 71 68   LYMPHS PCT % 18 16 18   MONOS PCT % 9 10 10   EOS PCT % 3 2 3       Results from last 7 days  Lab Units 12/27/18  0434 12/26/18  0459 12/25/18  1441   POTASSIUM mmol/L 3 9 4 3 4 5   CHLORIDE mmol/L 102 101 101   CO2 mmol/L 23 22 26   BUN mg/dL 42* 35* 37*   CREATININE mg/dL 1 35* 1 34* 1 19   CALCIUM mg/dL 8 1* 8 4 8 6   ALK PHOS U/L  --  159* 167*   ALT U/L  --  23 26   AST U/L  --  17 26     Lab Results   Component Value Date    TROPONINI <0 02 03/23/2018    TROPONINI <0 02 10/08/2017    CKTOTAL 53 03/23/2018    CKTOTAL 55 10/08/2017         Lab Results   Component Value Date    BLOODCX No Growth at 24 hrs  12/25/2018    BLOODCX No Growth at 24 hrs  12/25/2018    BLOODCX No Growth After 5 Days  03/23/2018    BLOODCX No Growth After 5 Days  03/23/2018    WOUNDCULT 3+ Growth of Staphylococcus aureus (A) 12/25/2018    WOUNDCULT 2+ Growth of  12/25/2018    WOUNDCULT 4+ Growth of Staphylococcus aureus 09/15/2016    WOUNDCULT 4+ Growth of Proteus mirabilis 09/15/2016    WOUNDCULT 4+ Growth of Mixed Skin Nini 09/15/2016         Imaging:  Results for orders placed during the hospital encounter of 03/23/18   XR chest 1 view portable    Narrative CHEST     INDICATION:   foot ulcer  COMPARISON:  October 8, 2017    EXAM PERFORMED/VIEWS:  XR CHEST PORTABLE  1 image    FINDINGS:    Heart enlarged  Pulmonary vessels unremarkable  Lungs and pleural spaces clear  Osseous structures appear within normal limits for patient age  Impression No acute abnormality in the chest         Workstation performed: [id number]       Results for orders placed during the hospital encounter of 10/08/17   XR chest 2 views    Narrative CHEST 2 View    INDICATION:  Chest pain  Arm infection      COMPARISON: 12/21/2015    VIEWS:  PA and lateral projections; 3 images    FINDINGS:         Heart shadow is enlarged but stable from prior exam    Persistent bibasilar patchy atelectasis  Visualized osseous structures appear within normal limits for the patient's age  Impression Stable cardiomegaly  Patchy bibasilar atelectasis  Workstation performed: [id number]         Labs & Imaging: I have personally reviewed pertinent reports        VTE Pharmacologic Prophylaxis: Heparin  VTE Mechanical Prophylaxis: sequential compression device    Code Status:   Level 1 - Full Code      "This note has been constructed using a voice recognition system"      Harrison Bee MD  12/27/2018,9:29 AM

## 2018-12-27 NOTE — SOCIAL WORK
Spoke with patient and SO Aniyah at bedside  Patient is scheduled for OR today  Discussed possible need for SNF rehab  Patient is agreeable if needed  Given freedom of choice and he would prefer QTC  Referral via [facility] to QTC  Await PT/OT recommendation

## 2018-12-27 NOTE — PROGRESS NOTES
Update: revieqwed dopplers, there is mild decrease in left STALIN but overall <50% stenosis in any major vessel which was noted previously  Study difficult due to body habitus  It is acceptable to proceed with planned toe amputation without vascular intervention  I met with patient to consent and discuss alternatives risks and complications  Questions answered  No guarantees were given to outcome of procedure  He is NPO  Consent signed  The heel wound is minimal and does not need surgery  Post-op will need strict elevation due to severe lower extremity swelling

## 2018-12-27 NOTE — PLAN OF CARE
CARDIOVASCULAR - ADULT     Maintains optimal cardiac output and hemodynamic stability Progressing        DISCHARGE PLANNING - CARE MANAGEMENT     Discharge to post-acute care or home with appropriate resources Progressing        INFECTION - ADULT     Absence or prevention of progression during hospitalization Progressing        Knowledge Deficit     Patient/family/caregiver demonstrates understanding of disease process, treatment plan, medications, and discharge instructions Progressing        METABOLIC, FLUID AND ELECTROLYTES - ADULT     Glucose maintained within target range Progressing        Nutrition/Hydration-ADULT     Nutrient/Hydration intake appropriate for improving, restoring or maintaining nutritional needs Progressing        PAIN - ADULT     Verbalizes/displays adequate comfort level or baseline comfort level Progressing        Potential for Falls     Patient will remain free of falls Progressing        Prexisting or High Potential for Compromised Skin Integrity     Skin integrity is maintained or improved Progressing        SAFETY ADULT     Patient will remain free of falls Progressing     Maintain or return to baseline ADL function Progressing     Maintain or return mobility status to optimal level Progressing        SKIN/TISSUE INTEGRITY - ADULT     Skin integrity remains intact Progressing     Incision(s), wounds(s) or drain site(s) healing without S/S of infection Progressing

## 2018-12-28 ENCOUNTER — TELEPHONE (OUTPATIENT)
Dept: CARDIOLOGY CLINIC | Facility: CLINIC | Age: 68
End: 2018-12-28

## 2018-12-28 LAB
ANION GAP SERPL CALCULATED.3IONS-SCNC: 11 MMOL/L (ref 4–13)
BACTERIA WND AEROBE CULT: ABNORMAL
BACTERIA WND AEROBE CULT: ABNORMAL
BASOPHILS # BLD AUTO: 0.04 THOUSANDS/ΜL (ref 0–0.1)
BASOPHILS NFR BLD AUTO: 1 % (ref 0–1)
BUN SERPL-MCNC: 37 MG/DL (ref 5–25)
CALCIUM SERPL-MCNC: 8.3 MG/DL (ref 8.3–10.1)
CHLORIDE SERPL-SCNC: 101 MMOL/L (ref 100–108)
CO2 SERPL-SCNC: 25 MMOL/L (ref 21–32)
CREAT SERPL-MCNC: 1.25 MG/DL (ref 0.6–1.3)
EOSINOPHIL # BLD AUTO: 0.23 THOUSAND/ΜL (ref 0–0.61)
EOSINOPHIL NFR BLD AUTO: 4 % (ref 0–6)
ERYTHROCYTE [DISTWIDTH] IN BLOOD BY AUTOMATED COUNT: 13.6 % (ref 11.6–15.1)
GFR SERPL CREATININE-BSD FRML MDRD: 60 ML/MIN/1.73SQ M
GLUCOSE SERPL-MCNC: 115 MG/DL (ref 65–140)
GLUCOSE SERPL-MCNC: 201 MG/DL (ref 65–140)
GLUCOSE SERPL-MCNC: 70 MG/DL (ref 65–140)
GLUCOSE SERPL-MCNC: 74 MG/DL (ref 65–140)
GLUCOSE SERPL-MCNC: 83 MG/DL (ref 65–140)
GRAM STN SPEC: ABNORMAL
HCT VFR BLD AUTO: 33.2 % (ref 36.5–49.3)
HCV AB SER QL: NORMAL
HGB BLD-MCNC: 10.8 G/DL (ref 12–17)
IMM GRANULOCYTES # BLD AUTO: 0.06 THOUSAND/UL (ref 0–0.2)
IMM GRANULOCYTES NFR BLD AUTO: 1 % (ref 0–2)
INR PPP: 1.1 (ref 0.86–1.17)
LYMPHOCYTES # BLD AUTO: 1.17 THOUSANDS/ΜL (ref 0.6–4.47)
LYMPHOCYTES NFR BLD AUTO: 18 % (ref 14–44)
MCH RBC QN AUTO: 29.5 PG (ref 26.8–34.3)
MCHC RBC AUTO-ENTMCNC: 32.5 G/DL (ref 31.4–37.4)
MCV RBC AUTO: 91 FL (ref 82–98)
MONOCYTES # BLD AUTO: 0.62 THOUSAND/ΜL (ref 0.17–1.22)
MONOCYTES NFR BLD AUTO: 10 % (ref 4–12)
NEUTROPHILS # BLD AUTO: 4.26 THOUSANDS/ΜL (ref 1.85–7.62)
NEUTS SEG NFR BLD AUTO: 66 % (ref 43–75)
NRBC BLD AUTO-RTO: 0 /100 WBCS
PLATELET # BLD AUTO: 229 THOUSANDS/UL (ref 149–390)
PMV BLD AUTO: 10.1 FL (ref 8.9–12.7)
POTASSIUM SERPL-SCNC: 3.8 MMOL/L (ref 3.5–5.3)
PROTHROMBIN TIME: 13.6 SECONDS (ref 11.8–14.2)
RBC # BLD AUTO: 3.66 MILLION/UL (ref 3.88–5.62)
SODIUM SERPL-SCNC: 137 MMOL/L (ref 136–145)
WBC # BLD AUTO: 6.38 THOUSAND/UL (ref 4.31–10.16)

## 2018-12-28 PROCEDURE — 97166 OT EVAL MOD COMPLEX 45 MIN: CPT

## 2018-12-28 PROCEDURE — 94640 AIRWAY INHALATION TREATMENT: CPT

## 2018-12-28 PROCEDURE — G8979 MOBILITY GOAL STATUS: HCPCS

## 2018-12-28 PROCEDURE — G8978 MOBILITY CURRENT STATUS: HCPCS

## 2018-12-28 PROCEDURE — 85610 PROTHROMBIN TIME: CPT | Performed by: INTERNAL MEDICINE

## 2018-12-28 PROCEDURE — G8987 SELF CARE CURRENT STATUS: HCPCS

## 2018-12-28 PROCEDURE — 80048 BASIC METABOLIC PNL TOTAL CA: CPT | Performed by: INTERNAL MEDICINE

## 2018-12-28 PROCEDURE — 85025 COMPLETE CBC W/AUTO DIFF WBC: CPT | Performed by: INTERNAL MEDICINE

## 2018-12-28 PROCEDURE — 97163 PT EVAL HIGH COMPLEX 45 MIN: CPT

## 2018-12-28 PROCEDURE — 99232 SBSQ HOSP IP/OBS MODERATE 35: CPT | Performed by: INTERNAL MEDICINE

## 2018-12-28 PROCEDURE — 82948 REAGENT STRIP/BLOOD GLUCOSE: CPT

## 2018-12-28 PROCEDURE — G8988 SELF CARE GOAL STATUS: HCPCS

## 2018-12-28 PROCEDURE — 94760 N-INVAS EAR/PLS OXIMETRY 1: CPT

## 2018-12-28 RX ORDER — WARFARIN SODIUM 7.5 MG/1
7.5 TABLET ORAL
Status: COMPLETED | OUTPATIENT
Start: 2018-12-28 | End: 2018-12-28

## 2018-12-28 RX ORDER — CEFAZOLIN SODIUM 1 G/50ML
1000 SOLUTION INTRAVENOUS EVERY 8 HOURS
Status: DISCONTINUED | OUTPATIENT
Start: 2018-12-28 | End: 2018-12-29 | Stop reason: HOSPADM

## 2018-12-28 RX ORDER — INSULIN GLARGINE 100 [IU]/ML
45 INJECTION, SOLUTION SUBCUTANEOUS
Status: DISCONTINUED | OUTPATIENT
Start: 2018-12-28 | End: 2018-12-29 | Stop reason: HOSPADM

## 2018-12-28 RX ORDER — WARFARIN SODIUM 7.5 MG/1
7.5 TABLET ORAL
Status: DISCONTINUED | OUTPATIENT
Start: 2018-12-28 | End: 2018-12-28

## 2018-12-28 RX ADMIN — TORSEMIDE 20 MG: 20 TABLET ORAL at 17:43

## 2018-12-28 RX ADMIN — METRONIDAZOLE 500 MG: 500 INJECTION, SOLUTION INTRAVENOUS at 15:52

## 2018-12-28 RX ADMIN — METRONIDAZOLE 500 MG: 500 INJECTION, SOLUTION INTRAVENOUS at 22:58

## 2018-12-28 RX ADMIN — ALLOPURINOL 300 MG: 300 TABLET ORAL at 09:08

## 2018-12-28 RX ADMIN — CEFAZOLIN SODIUM 1000 MG: 1 SOLUTION INTRAVENOUS at 20:54

## 2018-12-28 RX ADMIN — INSULIN GLARGINE 45 UNITS: 100 INJECTION, SOLUTION SUBCUTANEOUS at 21:49

## 2018-12-28 RX ADMIN — CEFEPIME HYDROCHLORIDE 2000 MG: 2 INJECTION, SOLUTION INTRAVENOUS at 05:03

## 2018-12-28 RX ADMIN — INSULIN LISPRO 4 UNITS: 100 INJECTION, SOLUTION INTRAVENOUS; SUBCUTANEOUS at 21:51

## 2018-12-28 RX ADMIN — LOSARTAN POTASSIUM 50 MG: 50 TABLET, FILM COATED ORAL at 09:08

## 2018-12-28 RX ADMIN — HEPARIN SODIUM 5000 UNITS: 5000 INJECTION INTRAVENOUS; SUBCUTANEOUS at 05:03

## 2018-12-28 RX ADMIN — INSULIN LISPRO 20 UNITS: 100 INJECTION, SOLUTION INTRAVENOUS; SUBCUTANEOUS at 13:32

## 2018-12-28 RX ADMIN — HEPARIN SODIUM 5000 UNITS: 5000 INJECTION INTRAVENOUS; SUBCUTANEOUS at 21:12

## 2018-12-28 RX ADMIN — ASPIRIN 81 MG 81 MG: 81 TABLET ORAL at 09:08

## 2018-12-28 RX ADMIN — METOPROLOL TARTRATE 25 MG: 25 TABLET, FILM COATED ORAL at 17:43

## 2018-12-28 RX ADMIN — HEPARIN SODIUM 5000 UNITS: 5000 INJECTION INTRAVENOUS; SUBCUTANEOUS at 13:31

## 2018-12-28 RX ADMIN — TORSEMIDE 20 MG: 20 TABLET ORAL at 09:08

## 2018-12-28 RX ADMIN — CEFAZOLIN SODIUM 1000 MG: 1 SOLUTION INTRAVENOUS at 13:30

## 2018-12-28 RX ADMIN — WARFARIN SODIUM 7.5 MG: 7.5 TABLET ORAL at 17:43

## 2018-12-28 RX ADMIN — METOPROLOL TARTRATE 25 MG: 25 TABLET, FILM COATED ORAL at 05:04

## 2018-12-28 RX ADMIN — INSULIN LISPRO 20 UNITS: 100 INJECTION, SOLUTION INTRAVENOUS; SUBCUTANEOUS at 09:09

## 2018-12-28 RX ADMIN — FLUTICASONE FUROATE AND VILANTEROL TRIFENATATE 1 PUFF: 100; 25 POWDER RESPIRATORY (INHALATION) at 07:51

## 2018-12-28 RX ADMIN — INSULIN LISPRO 20 UNITS: 100 INJECTION, SOLUTION INTRAVENOUS; SUBCUTANEOUS at 17:44

## 2018-12-28 NOTE — PLAN OF CARE
Problem: PHYSICAL THERAPY ADULT  Goal: Performs mobility at highest level of function for planned discharge setting  See evaluation for individualized goals  Treatment/Interventions: ADL retraining, Functional transfer training, Gait training  Equipment Recommended: Mee Martin       See flowsheet documentation for full assessment, interventions and recommendations  Outcome: Progressing  Prognosis: Good  Problem List:  (post-op 4th toe amb L foot)  Assessment: Pt able to mobilize from bed to chair with rw and few steps pwb Lle  Appears near baseline level of function  REcommend home PT OT for safety check with rw, and lower body care  Will see 1 additional session for longer distance amb  Pt reclined in chair and Lle elevated on pillow after session  Needs in reach  Barriers to Discharge:  (medical status)     Recommendation: Home PT     PT - OK to Discharge: Yes    See flowsheet documentation for full assessment

## 2018-12-28 NOTE — TELEPHONE ENCOUNTER
Pt currently admitted for amputation of 4th toe on left foot  Smita Marcelino, Internist PA called stating pt will be started on coumadin "tonight" 12/28/18 lovenox bridging to go home is the plan and he will need INR monitoring  Pt has Dx of Chronic Atrial Fibrillation  Pt has been non-compliant w/Coumadin in past but Héctor Ledezma stated pt is now agreeing  Please advise on information for INR information to set up with [facility]  INR Goal, Dose, Dx  Etc

## 2018-12-28 NOTE — PROGRESS NOTES
Mary Cummins  79 y o   male  1950  mrn [id number]    Assessment/Plan:  1  MSSA gangrenous left 4th toe infxn: No fever and WBC count is nml  Pt underwent resection of left 4th toe and OR cx is pending  Initial wound cx was positive for MSSA  All infected bone appears to have been removed  Bld cx's are neg  Pt presented with increased drainage, foul odor, and dark discoloration of his left 4th toe c/w gangrenous changes  No fever or elevated WBC count      A  Will narrow abx regimen to Ancef and Flagyl  B  Awaiting final OR cx results   C  If OR cx's are only positive for MSSA and Pt is cleared by Podiatry, could D/C Pt tomorrow on Keflex 500 mg po QID and cont through 1/6/19  D  Cont local wound care as per Podiatry       Subjective: Feels OK  Denies significant left foot pain    Objective:  Tmax: 99 3  Lungs: Clear anteriorly  Abd: +BS, soft, nontender  Ext: +Swelling and mild warmth of left leg  +Intact dressing on left foot  Labs:  CBC w/diff  Recent Labs      12/28/18   0513   WBC  6 38   HGB  10 8*   HCT  33 2*   PLT  229   NEUTOPHILPCT  66   LYMPHOPCT  18   MONOPCT  10   EOSPCT  4     BMP  Recent Labs      12/28/18   0513   K  3 8   CL  101   CO2  25   BUN  37*   CREATININE  1 25   CALCIUM  8 3     CMP  Recent Labs      12/26/18   0459   12/28/18   0513   K  4 3   < >  3 8   CL  101   < >  101   CO2  22   < >  25   BUN  35*   < >  37*   CREATININE  1 34*   < >  1 25   CALCIUM  8 4   < >  8 3   ALKPHOS  159*   --    --    ALT  23   --    --    AST  17   --    --     < > = values in this interval not displayed  Conor Pace [facility]    Cultures:  Lab Results   Component Value Date    BLOODCX No Growth at 48 hrs  12/25/2018    BLOODCX No Growth at 48 hrs  12/25/2018    BLOODCX No Growth After 5 Days  03/23/2018    BLOODCX No Growth After 5 Days  03/23/2018    BLOODCX No Growth After 5 Days  10/08/2017    BLOODCX No Growth After 5 Days  10/08/2017    BLOODCX No Growth After 5 Days   09/15/2016 BLOODCX No Growth After 5 Days   09/15/2016     Lab Results   Component Value Date    WOUNDCULT 3+ Growth of Staphylococcus aureus (A) 12/25/2018    WOUNDCULT 2+ Growth of  12/25/2018    WOUNDCULT 4+ Growth of Staphylococcus aureus 09/15/2016    WOUNDCULT 4+ Growth of Proteus mirabilis 09/15/2016    WOUNDCULT 4+ Growth of Mixed Skin Nini 09/15/2016     No results found for: Nico Foster  No results found for: SPUTUMCULTUR    MED:  Ancef: #1  Flagyl; #1  Vanco: #4  Cefepime: #4      Current Facility-Administered Medications:     acetaminophen (TYLENOL) tablet 650 mg, 650 mg, Oral, Q6H PRN, Daiana Soto MD    albuterol (PROVENTIL HFA,VENTOLIN HFA) inhaler 2 puff, 2 puff, Inhalation, Q6H PRN, Daiana Soto MD, 2 puff at 12/27/18 1104    allopurinol (ZYLOPRIM) tablet 300 mg, 300 mg, Oral, Daily, Daiana Soto MD, 300 mg at 12/28/18 0908    aspirin chewable tablet 81 mg, 81 mg, Oral, Daily, Daiana Soto MD, 81 mg at 12/28/18 0908    ceFAZolin (ANCEF) IVPB (premix) 1,000 mg, 1,000 mg, Intravenous, Q8H, Estephania Holly MD, Last Rate: 100 mL/hr at 12/28/18 1330, 1,000 mg at 12/28/18 1330    fluticasone-vilanterol (BREO ELLIPTA) 100-25 mcg/inh inhaler 1 puff, 1 puff, Inhalation, Daily, Daiana Soto MD, 1 puff at 12/28/18 0751    heparin (porcine) subcutaneous injection 5,000 Units, 5,000 Units, Subcutaneous, Q8H [facility], 5,000 Units at 12/28/18 1331 **AND** [CANCELED] Platelet count, , , Once, Daiana Soto MD    insulin glargine (LANTUS) subcutaneous injection 50 Units 0 5 mL, 50 Units, Subcutaneous, HS, Rodrigo Bachelor, DO, 50 Units at 12/27/18 2209    insulin lispro (HumaLOG) 100 units/mL subcutaneous injection 2-12 Units, 2-12 Units, Subcutaneous, TID AC, 2 Units at 12/26/18 0857 **AND** Fingerstick Glucose (POCT), , , TID AC, Daiana Soto MD    insulin lispro (HumaLOG) 100 units/mL subcutaneous injection 2-12 Units, 2-12 Units, Subcutaneous, HS, Emily Guerrier PA-C, 2 Units at 12/27/18 2209    insulin lispro (HumaLOG) 100 units/mL subcutaneous injection 20 Units, 20 Units, Subcutaneous, TID With Meals, Junior Bernice DO, 20 Units at 12/28/18 1332    losartan (COZAAR) tablet 50 mg, 50 mg, Oral, Daily, Trinity King MD, 50 mg at 12/28/18 0908    metoprolol tartrate (LOPRESSOR) tablet 25 mg, 25 mg, Oral, Q12H, Trinity King MD, 25 mg at 12/28/18 0504    ondansetron (ZOFRAN) injection 4 mg, 4 mg, Intravenous, Q6H PRN, Trinity King MD    torsemide (DEMADEX) tablet 20 mg, 20 mg, Oral, BID, Trinity King MD, 20 mg at 12/28/18 0908    Principal Problem:    Diabetic ulcer of toe of left foot associated with type 2 diabetes mellitus, limited to breakdown of skin ([facility] [address] )  Active Problems:    Diabetic foot ulcer ([address] )    Hypertension    Chronic atrial fibrillation ([address] )    Morbid obesity ([address] )    Chronic diastolic congestive heart failure ([address] )    GERD (gastroesophageal reflux disease)    Hyperlipidemia      Herve Bennett MD

## 2018-12-28 NOTE — PLAN OF CARE
Problem: OCCUPATIONAL THERAPY ADULT  Goal: Performs self-care activities at highest level of function for planned discharge setting  See evaluation for individualized goals  Treatment Interventions: ADL retraining, Functional transfer training, Continued evaluation, Energy conservation, Equipment evaluation/education          See flowsheet documentation for full assessment, interventions and recommendations  Outcome: Progressing  Limitation: Decreased ADL status, Decreased self-care trans, Decreased high-level ADLs  Prognosis: Good  Assessment: Pt is a 79 y o  male seen for OT evaluation s/p admit to [facility] on 12/25/2018 w/ Diabetic ulcer of toe of left foot associated with type 2 diabetes mellitus, limited to breakdown of skin ([facility] [address] )  Pt seen S/P 4th left toe amputation  Comorbidities affecting pt's functional performance at time of assessment include: diabetic foot ulcer, diabetes mellitus type 2, gout, chronic venous hypertension with ulceration, hypertension, chronic afib, morbid obesity, chronic diastolic CHF, cardiomyopathy, GERD, hyperlipedemia (see chart for extensive list of comorbidities)   Personal factors affecting pt at time of IE include:limited home support, behavioral pattern, difficulty performing ADLS, difficulty performing IADLS , decreased initiation and engagement  and health management   Prior to admission, pt was living alone in a [facility] with a first floor set up, receiving meals on wheels and visited by VN every other day for wound care, pt was independent with functional mobility with a RW  Upon evaluation: Pt requires supervision with transfers and min-mod A with self care 2* the following deficits impacting occupational performance: decreased strength, decreased balance, decreased tolerance and orthopedic restrictions   Pt to benefit from continued skilled OT tx while in the hospital to address deficits as defined above and maximize level of functional independence w ADL's and functional mobility  Occupational Performance areas to address include: bathing/shower, toilet hygiene, dressing, functional mobility, community mobility and clothing management  From OT standpoint, recommendation at time of d/c would be home OT       OT Discharge Recommendation: Home OT

## 2018-12-28 NOTE — ASSESSMENT & PLAN NOTE
· Appreciate recommendations from Podiatry  · Status post left 4th toe amputation 12/27/2018  · Lower extremity arterial duplex bilateral --> no evidence of significant right lower extremity arterial occlusive disease  There was less than 50% stenosis within the proximal and mid superficial femoral arteries of the left lower extremity and evidence of small vessel disease    Compared to LEADs in 07/2017 there was a decrease in the left STALIN and vascular recommends repeat testing in 1 year  · Follow-up operative cultures per Infectious Disease  · Continue IV antibiotics (de-escalate per ID) and local wound care  · OK to start [facility] per podiatry   · Weight-bearing as tolerated in surgical shoe  · Was discharged with home PT/OT and VNA

## 2018-12-28 NOTE — PROGRESS NOTES
Progress Note - Joy Ruiz 79 y o  male MRN: [id number]    Unit/Bed#: [address] [place] [address] [id number] Encounter: [id number]      CC: diabetes f/u    Subjective:   Joy Ruiz is a 79y o  year old male with type 2 diabetes  Feels well  No complaints  Had hypoglycemia this am  Eating well  Objective:     Vitals: Blood pressure 142/65, pulse 68, temperature 98 2 °F (36 8 °C), temperature source Oral, resp  rate 18, height 6' 3" (1 905 m), weight (!) 164 kg (361 lb 8 9 oz), SpO2 94 %  ,Body mass index is 45 19 kg/m²  Intake/Output Summary (Last 24 hours) at 12/28/18 1607  Last data filed at 12/28/18 1551   Gross per 24 hour   Intake              500 ml   Output             3630 ml   Net            -3130 ml       Physical Exam:  General Appearance: awake, appears stated age and cooperative  Head: Normocephalic, without obvious abnormality, atraumatic  Extremities: moves all extremities  Skin: Skin color and temperature normal    Pulm: no labored breathing    Lab:    POC Glucose (mg/dl)   Date Value   12/28/2018 74   12/28/2018 83   12/27/2018 169 (H)   12/27/2018 143 (H)   12/27/2018 144 (H)   12/27/2018 140   12/27/2018 127   12/26/2018 185 (H)   12/26/2018 120   12/26/2018 127       Assessment:  diabetes with hyperglycemia  Type 2 diabetes on insulin with left toe infection now postamputation on 12/27/18  Blood sugars low this am     Plan:  1  Will decrease lantus insulin to 45 units daily  2  Continue Humalog insulin 20 units with meals  3  Continue QID blood sugar testing with Humalog insulin sliding scale  Portions of the record may have been created with voice recognition software

## 2018-12-28 NOTE — SOCIAL WORK
Continue to follow  [facility] can accept Pt for SN/PT/OT  [facility] aware that Pt will have lovenox shots and PT INR draw on 12/31/18

## 2018-12-28 NOTE — SOCIAL WORK
Spoke with patients pharmacy, [facility] in [facility]  Cost to patient for  lovenox script is $49 84 and Novolin insulin is $49 29  Patient aware of cost and agreeable

## 2018-12-28 NOTE — ASSESSMENT & PLAN NOTE
Lab Results   Component Value Date    HGBA1C 9 4 (H) 12/27/2018     Recent Labs      12/27/18   1633  12/27/18   2128  12/28/18   0626  12/28/18   1118   POCGLU  143*  169*  83  74     Blood Sugar Average: Last 72 hrs:  (P) 945 [id number]   · Appreciate Endocrinology recommendations  · Still working closely with case management to determine most cost effective plan for discharge in regards to patient's blood insulin  · Prescription sent for Humalog pen and basilar pens and this was non formulary for patient's prescription plan  · Prescription sent for Novolin 70/30 45 unit b i d  where patient can get [facility]  Rx sent for diabetic supplies to include syringes and needles

## 2018-12-28 NOTE — PHYSICAL THERAPY NOTE
PT eval   12/28/18 0855   Note Type   Note type Eval only   Pain Assessment   Pain Assessment 0-10   Pain Score No Pain   Home Living   Type of Home House   Home Layout Two level; Able to live on main level with bedroom/bathroom   Prior Function   Level of [place] Independent with ADLs and functional mobility   Lives With Alone   Receives Help From Home health   ADL Assistance Independent   IADLs Needs assistance   Falls in the last 6 months 0   Vocational Retired   Comments hs trouble with lower body care   Restrictions/Precautions   Weight Bearing Precautions Per Order Yes   LLE Weight Bearing Per Order PWB  (with surgical shoe)   General   Additional Pertinent History was rec'ing vna wound care everother day then told to come to hospital underwent $th toe amputation 12/27/2018   Cognition   Overall Cognitive Status WFL   Attention Within functional limits   Orientation Level Oriented X4   Memory Within functional limits   Following Commands Follows all commands and directions without difficulty   RUE Assessment   RUE Assessment WFL   LUE Assessment   LUE Assessment WFL   RLE Assessment   RLE Assessment WNL   LLE Assessment   LLE Assessment (hip knee wfl ankle not tested arom to neutral df)   Coordination   Movements are Fluid and Coordinated 1   Bed Mobility   Rolling R 7  Independent   Rolling L 7  Independent   Supine to Sit 7  Independent   Transfers   Sit to Stand 6  Modified independent   Stand to Sit 6  Modified independent   Additional items Armrests   Stand pivot 5  Supervision   Additional items Verbal cues  (rw)   Balance   Static Sitting Normal   Dynamic Sitting Good   Static Standing Fair +   Dynamic Standing Fair +   Ambulatory Fair +   Endurance Deficit   Endurance Deficit No   Activity Tolerance   Activity Tolerance Patient tolerated treatment well   Nurse Made Aware yes   Assessment   Prognosis Good   Problem List (post-op 4th toe amb L foot)   Assessment Pt able to mobilize from bed to chair with rw and few steps pwb Lle  Appears near baseline level of function  REcommend home PT OT for safety check with rw, and lower body care  Will see 1 additional session for longer distance amb  Pt reclined in chair and Lle elevated on pillow after session  Needs in reach     Barriers to Discharge (medical status)   Goals   Patient Goals go  home get better   STG Expiration Date 12/29/18   Short Term Goal #1 1) safe ind trnasfers 2) safe ind amb with rw 25' level   Plan   Treatment/Interventions ADL retraining;Functional transfer training;Gait training   PT Frequency Follow-up visit only   Recommendation   Recommendation Home PT   Equipment Recommended Walker   PT - OK to Discharge Yes   Barthel Index   Feeding 10   Bathing 0   Grooming Score 5   Dressing Score 5   Bladder Score 10   Bowels Score 10   Toilet Use Score 10   Transfers (Bed/Chair) Score 10   Mobility (Level Surface) Score 0   Stairs Score 0   Barthel Index Score 60   Tran Quezada, PT

## 2018-12-28 NOTE — PROGRESS NOTES
Progress Note - Lorenzo Gonzalez 1950, 79 y o  male MRN: [id number]  Unit/Bed#: [address] [place] [address] Encounter: [id number] DOS: 12/28/18   Primary Care Provider: Ziyad Ruiz MD   Date and time admitted to hospital: 12/25/2018  2:09 PM    * Diabetic foot ulcer ([address] )   Assessment & Plan    · Appreciate recommendations from Podiatry  · Status post left 4th toe amputation 12/27/2018  · Lower extremity arterial duplex bilateral --> no evidence of significant right lower extremity arterial occlusive disease  There was less than 50% stenosis within the proximal and mid superficial femoral arteries of the left lower extremity and evidence of small vessel disease  Compared to LEADs in 07/2017 there was a decrease in the left STALIN and vascular recommends repeat testing in 1 year  · Follow-up operative cultures per Infectious Disease  · Continue IV antibiotics (de-escalate per ID) and local wound care  · OK to start [facility] per podiatry   · Weight-bearing as tolerated in surgical shoe  · Was discharged with home PT/OT and VNA     Chronic atrial fibrillation (HCC)   Assessment & Plan    · Continue Lopressor 25 mg q 12  · Prescription sent for Eliquis but this is unaffordable for the patient  · Patient has been noncompliant with Coumadin in the past but is agreeable at this point given evidence of chronic left lower extremity DVT  · Start Coumadin tonight and initiate Lovenox bridge  · I called Dr Italia De Jesus office to notify them he will need INR monitoring  · If otherwise medically stable, and lovenox affordable patient may be discharged in the next 24 hours on Lovenox bridge, prescription left with case management to check pricing  Otherwise patient will need to remain in the hospital until INR is therapeutic given hx DVT       Diabetic ulcer of toe of left foot associated with type 2 diabetes mellitus, limited to breakdown of skin [facility])   Assessment & Plan    Lab Results   Component Value Date    HGBA1C 9 4 (H) 2018     Recent Labs      18   1633  18   2128  18   0626  18   1118   POCGLU  143*  169*  83  74     Blood Sugar Average: Last 72 hrs:  (P) 017 [id number]   · Appreciate Endocrinology recommendations  · Still working closely with case management to determine most cost effective plan for discharge in regards to patient's blood insulin  · Prescription sent for Humalog pen and basilar pens and this was non formulary for patient's prescription plan  · Prescription sent for Novolin 70/30 45 unit b i d  where patient can get [facility] relion brand  Rx sent for diabetic supplies to include syringes and needles  Chronic diastolic congestive heart failure (HCC)   Assessment & Plan    · Continue beta-blocker, Arb, torsemide     Morbid obesity (HCC)   Assessment & Plan    · Recommend dietary and lifestyle modifications       VTE Pharmacologic Prophylaxis:   Pharmacologic: Enoxaparin (Lovenox)  Mechanical VTE Prophylaxis in Place: Yes    Patient Centered Rounds: I have performed bedside rounds with nursing staff today  Discussions with Specialists or Other Care Team Provider: nursing, cm, Dr Esqueda, [facility]iatry [facility]     Education and Discussions with Family / Patient: patient     Time Spent for Care: 45 minutes  More than 50% of total time spent on counseling and coordination of care as described above  Current Length of Stay: 3 day(s)    Current Patient Status: Inpatient   Certification Statement: The patient will continue to require additional inpatient hospital stay due to IV antibiotics, pending OR cultures, safe dc plan in regards to insulin     Discharge Plan / Estimated Discharge Date: pending above     Code Status: Level 1 - Full Code    Subjective:   Pt seen and examined  Denies pain in foot  No chest pain or sob  No n/v/d       Objective:     Vitals:   Temp (24hrs), Av 2 °F (36 8 °C), Min:97 4 °F (36 3 °C), Max:99 3 °F (37 4 °C)    Temp:  [97 4 °F (36 3 °C)-99 3 °F (37 4 °C)] 99 3 °F (37 4 °C)  HR:  [60-74] 60  Resp:  [18-23] 18  BP: (117-159)/(57-83) 123/58  SpO2:  [93 %-98 %] 97 %  Body mass index is 45 19 kg/m²  Input and Output Summary (last 24 hours): Intake/Output Summary (Last 24 hours) at 12/28/18 1414  Last data filed at 12/28/18 1101   Gross per 24 hour   Intake              700 ml   Output             3250 ml   Net            -2550 ml     Physical Exam:     Physical Exam   Constitutional: He is oriented to person, place, and time  He appears well-developed and well-nourished  HENT:   Head: Normocephalic and atraumatic  Eyes: EOM are normal  No scleral icterus  Neck: Normal range of motion  Neck supple  Cardiovascular: Normal rate, regular rhythm and normal heart sounds  No murmur heard  Pulmonary/Chest: Effort normal and breath sounds normal    Abdominal: Soft  Bowel sounds are normal    Musculoskeletal: Normal range of motion  He exhibits edema  Neurological: He is alert and oriented to person, place, and time  Skin: Skin is warm and dry  Venous stasis change b/l LE  L 4th toe amputation, foot in surgical shoe    Psychiatric: He has a normal mood and affect  Additional Data:    Labs:       Results from last 7 days  Lab Units 12/28/18  0513   WBC Thousand/uL 6 38   HEMOGLOBIN g/dL 10 8*   HEMATOCRIT % 33 2*   PLATELETS Thousands/uL 229   NEUTROS PCT % 66   LYMPHS PCT % 18   MONOS PCT % 10   EOS PCT % 4       Results from last 7 days  Lab Units 12/28/18  0513  12/26/18  0459   POTASSIUM mmol/L 3 8  < > 4 3   CHLORIDE mmol/L 101  < > 101   CO2 mmol/L 25  < > 22   BUN mg/dL 37*  < > 35*   CREATININE mg/dL 1 25  < > 1 34*   CALCIUM mg/dL 8 3  < > 8 4   ALK PHOS U/L  --   --  159*   ALT U/L  --   --  23   AST U/L  --   --  17   < > = values in this interval not displayed  * I Have Reviewed All Lab Data Listed Above  * Additional Pertinent Lab Tests Reviewed:  [facility] Admission Reviewed    Imaging:    Imaging Reports Reviewed Today Include: all  Imaging Personally Reviewed by Myself Includes:  None     Recent Cultures (last 7 days):       Results from last 7 days  Lab Units 12/27/18  1439 12/25/18  1509 12/25/18  1446 12/25/18  1441   BLOOD CULTURE   --  No Growth at 48 hrs  --  No Growth at 48 hrs  GRAM STAIN RESULT  No polys seen  1+ Gram positive cocci in pairs  --  No polys seen  3+ Gram negative rods  2+ Gram positive rods  2+ Gram positive cocci in pairs  2+ Gram positive cocci in clusters  --    WOUND CULTURE   --   --  3+ Growth of Staphylococcus aureus*  2+ Growth of   --      Last 24 Hours Medication List:     Current Facility-Administered Medications:  acetaminophen 650 mg Oral Q6H PRN Beba Galvan MD    albuterol 2 puff Inhalation Q6H PRN Beba Galvan MD    allopurinol 300 mg Oral Daily Beba Galvan MD    aspirin 81 mg Oral Daily Beba Galvan MD    cefazolin 1,000 mg Intravenous Q8H Meghan Lobato MD Last Rate: 1,000 mg (12/28/18 1330)   fluticasone-vilanterol 1 puff Inhalation Daily Beba Galvan MD    heparin (porcine) 5,000 Units Subcutaneous Q8H [address] Beba Galvan MD    insulin glargine 50 Units Subcutaneous HS Humberto Veloz DO    insulin lispro 2-12 Units Subcutaneous TID AC Beba Galvan MD    insulin lispro 2-12 Units Subcutaneous HS Davy Ji PA-C    insulin lispro 20 Units Subcutaneous TID With Meals Humberto Veloz DO    losartan 50 mg Oral Daily Beba Galvan MD    metoprolol tartrate 25 mg Oral Q12H Beba Galvan MD    metroNIDAZOLE 500 mg Intravenous Q8H Meghan Lobato MD    ondansetron 4 mg Intravenous Q6H PRN Beba Galvan MD    torsemide 20 mg Oral BID Beba Galvan MD         Today, Patient Was Seen By: Macie Marquez PA-C    ** Please Note: This note has been constructed using a voice recognition system   **

## 2018-12-28 NOTE — OCCUPATIONAL THERAPY NOTE
[address] Evaluation     Patient Name: Maki Foster  SRVRG'N Date: 12/28/2018  Problem List  Patient Active Problem List   Diagnosis    Diabetic foot ulcer ([place] [address] )    Diabetes mellitus type 2, uncontrolled ([place] [address] )    Gout    Chronic venous hypertension w ulceration, bilateral ([place] [address] )    Hypertension    Chronic atrial fibrillation (HCC)    Morbid obesity (HCC)    Chronic diastolic congestive heart failure ([place] [address] )    Cardiomyopathy ([place] [address] )    Diabetes, polyneuropathy ([place] [address] )    GERD (gastroesophageal reflux disease)    Hyperlipidemia    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, right ([place] [address] )    Varicose veins of lower extremity with ulcer, left ([place] [address] )    Onychomycosis    Gallstones    Blood alkaline phosphatase increased compared with prior measurement    Chronic heel ulcer, left, with fat layer exposed ([place] [address] )    Localized edema    Diabetic ulcer of toe of left foot associated with type 2 diabetes mellitus, limited to breakdown of skin ([place] [address] )     Past Medical History  Past Medical History:   Diagnosis Date    CHF (congestive heart failure) ([place] [address] )     COPD (chronic obstructive pulmonary disease) ([place] [address] )     Decubitus ulcer of heel     LAST ASSESSED [id number]    Diabetes mellitus ([place] [address] )     History of varicose veins     Hypertension     [facility]     Neuropathy     Seasonal allergies      Past Surgical History  Past Surgical History:   Procedure Laterality Date    ANGIOPLASTY      W/ FLUOROSC ANGIOGRAPGY PERIPHERAL ARTERY ADDITIONAL  LAST ASSESSED [id number]    ANGIOPLASTY / STENTING FEMORAL      TANSCATH INTRAVASCULAR STENT PLACEMENT PERCUTANEOUS FEMORAL     FULL THICKNESS SKIN GRAFT      TENDON REPAIR      TOE AMPUTATION Left 12/27/2018    Procedure: AMPUTATION left 4th TOE;  Surgeon: Yesi Day DPM;  Location:  MAIN OR;  Service: Podiatry         12/28/18 0851   Note Type   Note type Eval only   Restrictions/Precautions   Weight Bearing Precautions Per Order Yes   LLE Weight Bearing Per Order PWB   Braces or Orthoses (Surgical shoe)   Pain Assessment   Pain Assessment 0-10   Pain Score No Pain   Home Living   Type of Home House   Home Layout Two level; Able to live on main level with bedroom/bathroom; Performs ADLs on one level;Ramped entrance  (1 curb step to enter)   Bathroom Shower/Tub Tub/shower unit   Bathroom Equipment Grab bars in shower;Built-in [address]; Wheelchair-manual   Additional Comments Pt used RW PTA   Prior Function   Level of [place] Independent with ADLs and functional mobility; Needs assistance with IADLs   Lives With Alone   Receives Help From Home health   ADL Assistance Independent   IADLs Needs assistance  (Visiting nurse and meals on wheel)   Falls in the last 6 months 0   Vocational Retired   Comments Pt reports he spends most of his day in "bed" and that while he performs his ADLs independently, he does so with great difficulty      Psychosocial   Psychosocial (WDL) WDL   Subjective   Subjective "I can't put socks on my feet, there aren't even socks big enough for my feet"    ADL   Where Assessed Edge of bed   Eating Assistance 7  Independent   Grooming Assistance 7  Independent   UB Bathing Assistance 5  Supervision/Setup   LB Bathing Assistance 2  Maximal Assistance   UB Dressing Assistance 5  Supervision/Setup   LB Dressing Assistance 2  Maximal [address]  5  Supervision/Setup   Bed Mobility   Rolling R 7  Independent   Rolling L 7  Independent   Supine to Sit 7  Independent   Transfers   Sit to Stand 6  Modified independent   Additional items (RW)   Stand to Sit 6  Modified independent   Additional items Armrests   Stand pivot 5  Supervision   Additional items Verbal cues   Functional Mobility   Functional Mobility 5  Supervision   Additional Comments Pt initially concerned about WB status but assured PWB is fine, transfers carefully to B/S chair  Balance   Static Sitting Normal   Dynamic Sitting Good   Static Standing Fair +   Dynamic Standing Fair   Activity Tolerance   Activity Tolerance Patient tolerated treatment well   Nurse Made Aware OK to see per Philip Ulloa RN   RUE Assessment   RUE Assessment WFL   LUE Assessment   LUE Assessment WFL   Hand Function   Gross Motor Coordination Functional   Fine Motor Coordination Functional   Vision-Basic Assessment   Current Vision No visual deficits   Cognition   Overall Cognitive Status WFL   Arousal/Participation Alert; Cooperative   Attention Within functional limits   Orientation Level Oriented X4   Memory Within functional limits   Following Commands Follows all commands and directions without difficulty   Assessment   Limitation Decreased ADL status; Decreased self-care trans;Decreased high-level ADLs   Prognosis Good   Assessment Pt is a 79 y o  male seen for OT evaluation s/p admit to [address] on 12/25/2018 w/ Diabetic ulcer of toe of left foot associated with type 2 diabetes mellitus, limited to breakdown of skin ([facility] [address] [address] )  Pt seen S/P 4th left toe amputation  Comorbidities affecting pt's functional performance at time of assessment include: diabetic foot ulcer, diabetes mellitus type 2, gout, chronic venous hypertension with ulceration, hypertension, chronic afib, morbid obesity, chronic diastolic CHF, cardiomyopathy, GERD, hyperlipedemia (see chart for extensive list of comorbidities)   Personal factors affecting pt at time of IE include:limited home support, behavioral pattern, difficulty performing ADLS, difficulty performing IADLS , decreased initiation and engagement  and health management   Prior to admission, pt was living alone in a [facility] with a first floor set up, receiving meals on wheels and visited by VN every other day for wound care, pt was independent with functional mobility with a RW   Upon evaluation: Pt requires supervision with transfers and min-mod A with self care 2* the following deficits impacting occupational performance: decreased strength, decreased balance, decreased tolerance and orthopedic restrictions  Pt to benefit from continued skilled OT tx while in the hospital to address deficits as defined above and maximize level of functional independence w ADL's and functional mobility  Occupational Performance areas to address include: bathing/shower, toilet hygiene, dressing, functional mobility, community mobility and clothing management  From OT standpoint, recommendation at time of d/c would be home OT  Goals   Patient Goals Go home, get better  Long Term Goal SEE BELOW   Plan   Treatment Interventions ADL retraining;Functional transfer training;Continued evaluation; Energy conservation;Equipment evaluation/education   Goal Expiration Date 01/07/19   Treatment Day 0   OT Frequency 2-3x/wk   Recommendation   OT Discharge Recommendation Home OT   Barthel Index   Feeding 10   Bathing 0   Grooming Score 5   Dressing Score 5   Bladder Score 10   Bowels Score 10   Toilet Use Score 10   Transfers (Bed/Chair) Score 10   Mobility (Level Surface) Score 0   Stairs Score 0   Barthel Index Score 60     GOALS    1) Pt will improve activity tolerance to G for min 30 min txment sessions    2) Pt will complete UB/LB dressing/self care w/ mod I using adaptive device and DME as needed    3) Pt will complete bathing w/ Mod I w/ use of AE and DME as needed    4) Pt will complete toileting w/ mod I w/ G hygiene/thoroughness using DME as needed    5) Pt will improve functional transfers to Mod I on/off all surfaces using DME as needed w/ G balance/safety     6) Pt will improve functional mobility during ADL/IADL/leisure tasks to Mod I using DME as needed w/ G balance/safety     7) Pt will participate in simulated IADL management task to increase independence to Mod I w/ G safety and endurance    8) Pt will demonstrate G attention for 10 minutes during ongoing cognitive assessment to assist w/ safe d/c planning/recommendations    9) Pt will demonstrate G carryover of pt/caregiver education and training as appropriate w/ mod I w/o cues w/ good tolerance    10) Pt will demonstrate 100% carryover of energy conservation techniques w/ mod I t/o functional I/ADL/leisure tasks w/o cues s/p skilled education      Oliverio Hum, MOT, OTR/L

## 2018-12-28 NOTE — ASSESSMENT & PLAN NOTE
· Continue Lopressor 25 mg q 12  · Prescription sent for Eliquis but this is unaffordable for the patient  · Patient has been noncompliant with Coumadin in the past but is agreeable at this point given evidence of chronic left lower extremity DVT  · Start Coumadin tonight and initiate Lovenox bridge  · I called Dr Jay Sharp office to notify them he will need INR monitoring  · If otherwise medically stable, and lovenox affordable patient may be discharged in the next 24 hours on Lovenox bridge, prescription left with case management to check pricing  Otherwise patient will need to remain in the hospital until INR is therapeutic given hx DVT

## 2018-12-28 NOTE — PROGRESS NOTES
Foot wounds cleansed, irrigated with saline, and redressed  Pt has +1 pedal pulse  Foot is odorous and there is a small amount of drainage from ulcer on heel  Heel dressed with dermagran, 4x4, ameena, and ace wrap   4th toe amputation is healing well with no swelling or drainage  Dressed with non-adherent, 4x4, ameena, and ace wrap  Patient tolerated well  No issues at this time

## 2018-12-29 VITALS
HEART RATE: 68 BPM | WEIGHT: 315 LBS | TEMPERATURE: 98.1 F | OXYGEN SATURATION: 92 % | DIASTOLIC BLOOD PRESSURE: 67 MMHG | BODY MASS INDEX: 39.17 KG/M2 | HEIGHT: 75 IN | SYSTOLIC BLOOD PRESSURE: 122 MMHG | RESPIRATION RATE: 18 BRPM

## 2018-12-29 LAB
ANION GAP SERPL CALCULATED.3IONS-SCNC: 12 MMOL/L (ref 4–13)
BACTERIA SPEC ANAEROBE CULT: NORMAL
BASOPHILS # BLD AUTO: 0.04 THOUSANDS/ΜL (ref 0–0.1)
BASOPHILS NFR BLD AUTO: 1 % (ref 0–1)
BUN SERPL-MCNC: 40 MG/DL (ref 5–25)
CALCIUM SERPL-MCNC: 8.2 MG/DL (ref 8.3–10.1)
CHLORIDE SERPL-SCNC: 101 MMOL/L (ref 100–108)
CO2 SERPL-SCNC: 27 MMOL/L (ref 21–32)
CREAT SERPL-MCNC: 1.26 MG/DL (ref 0.6–1.3)
EOSINOPHIL # BLD AUTO: 0.34 THOUSAND/ΜL (ref 0–0.61)
EOSINOPHIL NFR BLD AUTO: 5 % (ref 0–6)
ERYTHROCYTE [DISTWIDTH] IN BLOOD BY AUTOMATED COUNT: 13.7 % (ref 11.6–15.1)
GFR SERPL CREATININE-BSD FRML MDRD: 59 ML/MIN/1.73SQ M
GLUCOSE SERPL-MCNC: 73 MG/DL (ref 65–140)
GLUCOSE SERPL-MCNC: 86 MG/DL (ref 65–140)
GLUCOSE SERPL-MCNC: 87 MG/DL (ref 65–140)
HCT VFR BLD AUTO: 35.6 % (ref 36.5–49.3)
HGB BLD-MCNC: 11.3 G/DL (ref 12–17)
IMM GRANULOCYTES # BLD AUTO: 0.05 THOUSAND/UL (ref 0–0.2)
IMM GRANULOCYTES NFR BLD AUTO: 1 % (ref 0–2)
INR PPP: 1.12 (ref 0.86–1.17)
LYMPHOCYTES # BLD AUTO: 1.55 THOUSANDS/ΜL (ref 0.6–4.47)
LYMPHOCYTES NFR BLD AUTO: 24 % (ref 14–44)
MCH RBC QN AUTO: 28.9 PG (ref 26.8–34.3)
MCHC RBC AUTO-ENTMCNC: 31.7 G/DL (ref 31.4–37.4)
MCV RBC AUTO: 91 FL (ref 82–98)
MONOCYTES # BLD AUTO: 0.67 THOUSAND/ΜL (ref 0.17–1.22)
MONOCYTES NFR BLD AUTO: 10 % (ref 4–12)
NEUTROPHILS # BLD AUTO: 3.85 THOUSANDS/ΜL (ref 1.85–7.62)
NEUTS SEG NFR BLD AUTO: 59 % (ref 43–75)
NRBC BLD AUTO-RTO: 0 /100 WBCS
PLATELET # BLD AUTO: 281 THOUSANDS/UL (ref 149–390)
PMV BLD AUTO: 10.2 FL (ref 8.9–12.7)
POTASSIUM SERPL-SCNC: 3.5 MMOL/L (ref 3.5–5.3)
PROTHROMBIN TIME: 13.8 SECONDS (ref 11.8–14.2)
RBC # BLD AUTO: 3.91 MILLION/UL (ref 3.88–5.62)
SODIUM SERPL-SCNC: 140 MMOL/L (ref 136–145)
WBC # BLD AUTO: 6.5 THOUSAND/UL (ref 4.31–10.16)

## 2018-12-29 PROCEDURE — 80048 BASIC METABOLIC PNL TOTAL CA: CPT | Performed by: INTERNAL MEDICINE

## 2018-12-29 PROCEDURE — 85610 PROTHROMBIN TIME: CPT | Performed by: INTERNAL MEDICINE

## 2018-12-29 PROCEDURE — 82948 REAGENT STRIP/BLOOD GLUCOSE: CPT

## 2018-12-29 PROCEDURE — 85025 COMPLETE CBC W/AUTO DIFF WBC: CPT | Performed by: INTERNAL MEDICINE

## 2018-12-29 PROCEDURE — 99239 HOSP IP/OBS DSCHRG MGMT >30: CPT | Performed by: INTERNAL MEDICINE

## 2018-12-29 RX ORDER — WARFARIN SODIUM 2 MG/1
2 TABLET ORAL
Qty: 30 TABLET | Refills: 0 | Status: SHIPPED | OUTPATIENT
Start: 2018-12-29 | End: 2019-01-10 | Stop reason: DRUGHIGH

## 2018-12-29 RX ORDER — CEPHALEXIN 500 MG/1
500 CAPSULE ORAL EVERY 6 HOURS SCHEDULED
Qty: 34 CAPSULE | Refills: 0 | Status: SHIPPED | OUTPATIENT
Start: 2018-12-29 | End: 2019-01-08

## 2018-12-29 RX ORDER — WARFARIN SODIUM 5 MG/1
5 TABLET ORAL
Qty: 30 TABLET | Refills: 0 | Status: SHIPPED | OUTPATIENT
Start: 2018-12-29 | End: 2019-01-14 | Stop reason: SDUPTHER

## 2018-12-29 RX ADMIN — ALLOPURINOL 300 MG: 300 TABLET ORAL at 08:54

## 2018-12-29 RX ADMIN — FLUTICASONE FUROATE AND VILANTEROL TRIFENATATE 1 PUFF: 100; 25 POWDER RESPIRATORY (INHALATION) at 11:10

## 2018-12-29 RX ADMIN — TORSEMIDE 20 MG: 20 TABLET ORAL at 08:54

## 2018-12-29 RX ADMIN — ASPIRIN 81 MG 81 MG: 81 TABLET ORAL at 08:54

## 2018-12-29 RX ADMIN — LOSARTAN POTASSIUM 50 MG: 50 TABLET, FILM COATED ORAL at 08:54

## 2018-12-29 RX ADMIN — INSULIN LISPRO 20 UNITS: 100 INJECTION, SOLUTION INTRAVENOUS; SUBCUTANEOUS at 08:55

## 2018-12-29 RX ADMIN — CEFAZOLIN SODIUM 1000 MG: 1 SOLUTION INTRAVENOUS at 04:58

## 2018-12-29 RX ADMIN — INSULIN LISPRO 20 UNITS: 100 INJECTION, SOLUTION INTRAVENOUS; SUBCUTANEOUS at 12:18

## 2018-12-29 RX ADMIN — METRONIDAZOLE 500 MG: 500 INJECTION, SOLUTION INTRAVENOUS at 05:59

## 2018-12-29 RX ADMIN — HEPARIN SODIUM 5000 UNITS: 5000 INJECTION INTRAVENOUS; SUBCUTANEOUS at 05:03

## 2018-12-29 RX ADMIN — METOPROLOL TARTRATE 25 MG: 25 TABLET, FILM COATED ORAL at 05:03

## 2018-12-29 RX ADMIN — CEFAZOLIN SODIUM 1000 MG: 1 SOLUTION INTRAVENOUS at 12:18

## 2018-12-29 NOTE — ASSESSMENT & PLAN NOTE
Lab Results   Component Value Date    HGBA1C 9 4 (H) 12/27/2018     Recent Labs      12/28/18   1118  12/28/18   1633  12/28/18   2049  12/29/18   0605   POCGLU  74  115  201*  87     Blood Sugar Average: Last 72 hrs:  (P) 780 [id number]   · Appreciate Endocrinology recommendations  · Still working closely with case management to determine most cost effective plan for discharge in regards to patient's blood insulin  · Prescription sent for Humalog pen and basilar pens and this was non formulary for patient's prescription plan  · Prescription sent for Novolin 70/30 45 unit b i d  where patient can get [facility] relion brand  Rx sent for diabetic supplies to include syringes and needles  Patient states this will be affordable

## 2018-12-29 NOTE — DISCHARGE INSTRUCTIONS
PRESCRIPTIONS WENT TO [facility] IN [place] ON [address]     LEFT FOOT CARE  1  Change dressing every other day  2  betadyne swab to incision  Cover incision with maxsorb, gauze, ACE  3  Cover heel with ABD and incorporate into dressing  ACE bandage from toes to lower leg  4  WBAT in surgical shoe however he should be off the foot with it elevated as much as possible  Continue Lovenox injection twice a day until your INR is therapeutic 2-3  Continue Coumadin 10 mg daily in addition to the Lovenox  Stop the Lovenox when your cardiologist tells you your Coumadin level, INR therapeutic  Visiting nurses will come to the house on Monday and get repeat INR  Continue antibiotics Keflex 500 mg 4 times a day through 1/6/19  Take 2 more doses today  Diabetic Foot Ulcers   WHAT YOU NEED TO KNOW:   A diabetic foot ulcer is an open sore that can develop anywhere on your foot or toes  The ulcers usually develop on the bottom of the foot  You may first notice drainage on your sock  Drainage is fluid that may be yellow, brown, or red  The fluid may also contain pus or blood         DISCHARGE INSTRUCTIONS:   Call 911 if:   · You have a fever with chills       · You begin vomiting      · You feel faint or become confused  Contact your healthcare provider if:   · You get another diabetic foot ulcer      · Your foot becomes red, warm, and swollen       · Your foot ulcer has a bad smell or is draining pus       · You feel pain in a foot that used to have little or no feeling       · You see black or dead tissue in or around your ulcer       · Your ulcer becomes bigger, deeper, or does not heal       · You have questions or concerns about your condition or care  Medicines: You may need any of the following:  · Antibiotics help treat or prevent a bacterial infection      · Take your medicine as directed  Contact your healthcare provider if you think your medicine is not helping or if you have side effects   Tell him or her if you are allergic to any medicine  Keep a list of the medicines, vitamins, and herbs you take  Include the amounts, and when and why you take them  Bring the list or the pill bottles to follow-up visits  Carry your medicine list with you in case of an emergency  Care for your wound as directed: A bandages will be put on your ulcer  Your healthcare provider will give you instructions on changing your bandage  You may need to clean the wound and change the bandage daily  The bandage may contain medicines to help your ulcer heal  You may be asked to put medicine on your foot ulcer before putting on the bandage  The medicine may also prevent growth of tissue that is not healthy  You may need to cover your wound with a plastic bag while you bathe  Ask your healthcare provider for instructions on bathing until your foot heals  Prevent diabetic foot ulcers: Good foot care may help prevent ulcers, or keep them from getting worse  Ask someone to help you if you are not able to check your feet by yourself  You or another person may need to do any of the following:  · Keep your blood sugar levels under control  Continue the plan for your diabetes that you and your healthcare provider have discussed  Healthy food choices and taking your medicines as directed may help control blood sugars  Contact your healthcare provider if your blood sugar levels are higher than directed      · Wash your feet each day with soap and warm water  Do not use hot water, because this can injure your foot  Dry your feet gently with a towel after you wash them  Dry between and under your toes  Put lotion or moisturizer on your feet  Do not put lotion or moisturizer between your toes      · Check your feet each day  Look at your whole foot, including the bottom, and between and under your toes  Check for wounds, corns, and calluses  Feel your feet by running your hands along the tops, bottoms, sides, and between your toes   Use a nonbreakable mirror to check your feet if you have trouble seeing the bottoms  Do not try to remove corns or calluses yourself  File or cut your toenails straight across            · Protect your feet  Do not walk barefoot or wear your shoes without socks  Check your shoes for rocks or other objects that can hurt your feet  Wear cotton socks to help keep your feet dry  Wear socks without toe seams, or wear them with the seams inside out  Change your socks each day  Do not wear socks that are dirty or damp      · Wear shoes that fit well  Wear shoes that do not rub against any area of your feet  Your shoes should be ½ to ¾ inch (1 to 2 centimeters) longer than your feet  Your shoes should also have extra space around the widest part of your feet  Walking or athletic shoes with laces or straps that adjust are best  Ask your healthcare provider for help to choose shoes that fit you best  Ask him if you need to wear an insert, orthotic, or bandage on your feet      · Do not smoke  Nicotine can cause damage to your blood vessels and increases your risk for foot ulcers  Do not use e-cigarettes or smokeless tobacco in place of cigarettes or to help you quit  They still contain nicotine  Ask your healthcare provider for information if you currently smoke and need help quitting      · Do not drink alcohol  Alcohol increases your blood sugar levels and make your diabetes more difficult to manage  Ask your healthcare provider for information if you need help to quit drinking alcohol       · Maintain a healthy weight  Ask your healthcare provider how much you should weigh  A healthy weight can help you control your diabetes  Ask him to help you create a weight loss plan if you are overweight  Even a 10 to 15 pound weight loss can help you manage your blood sugar level  Follow up with your healthcare provider or foot specialist as directed: You may need to return often to have your wound checked   Your wound may be measured to see if it is getting smaller  Bring any offloading devices or footwear to your follow-up visits so your healthcare provider or specialist can check them  Write down your questions so you remember to ask them during your visits  © 2017 [employer] Information is for End User's use only and may not be sold, redistributed or otherwise used for commercial purposes  All illustrations and images included in CareNotes® are the copyrighted property of A [employer] M [employer] Inc  or [employer]  The above information is an  only  It is not intended as medical advice for individual conditions or treatments  Talk to your doctor, nurse or pharmacist before following any medical regimen to see if it is safe and effective for you      Follow-up with the PCP in 1 week  Follow-up with the podiatry and Cardiology as outpatient  Return to ER with any worsening or chest pain, palpitation, wound infection or any other alarming symptoms

## 2018-12-29 NOTE — ASSESSMENT & PLAN NOTE
· Continue Lopressor 25 mg q 12  · Prescription sent for Eliquis but this is unaffordable for the patient  · Patient has been noncompliant with Coumadin in the past but is agreeable at this point given evidence of chronic left lower extremity DVT  · Continue Coumadin with Lovenox bridge upon d/c - pt states 49$ is affordable   · Will d/c with 5mg tabs and 2mg tabs    · Asked patient to take 10mg daily until INR check on mon   · I called Dr Sidney Gonzalez office to notify them he will need INR monitoring Friday   · INR on Monday with Visiting nurse

## 2018-12-29 NOTE — PROGRESS NOTES
Progress Note - Podiatry  Latosha Cummins 79 y o  male MRN: [id number]  Unit/Bed#: [facility] [place] 203-01 Encounter: [id number]    Assessment  1  Diabetic peripheral neuropathy of lower extremities  2  Diabetic foot ulcer distal 4th toe Left foot (Stage IV) s/p amputation  3  Diabetic foot ulcer plantar heel Left foot (Stage I)  4  PAD    Plan:  1  Stable post-op  Stressed compliance  He should be off the foot with it elevated as much as possible  If he uses the restroom, the shoe needs to be on his foot  I recommended just keeping the shoe on his foot at all times  2  Dressings changed, I placed DC orders in chart  3  MSSA osteomyelitis, on ancef, transition to oral per ID    4  Stable for DC in my opinion, followup at [address] [facility]  Subjective/Objective   Chief Complaint:   Chief Complaint   Patient presents with    Foot Ulcer     Refered to ED by VNA for evaluation of left foot "infection"  Pt states that he has VNA for wound care for chronic ulcers  Denies any fever or chills, drainage  Has noted " a bad smell"       Subjective: 79 y o  y/o male seen and evaluated at bedside  No acute events overnight  Denies N/F/V/SOB/CP/cough/diarrhea/constipation  Patient is walking to bathroom without his surgical shoe  Blood pressure 122/67, pulse 68, temperature 98 1 °F (36 7 °C), temperature source Oral, resp  rate 18, height 6' 3" (1 905 m), weight (!) 160 kg (353 lb 13 4 oz), SpO2 92 %  ,Body mass index is 44 23 kg/m²      Lab Results   Component Value Date    WBC 6 50 12/29/2018    HGB 11 3 (L) 12/29/2018    HCT 35 6 (L) 12/29/2018    MCV 91 12/29/2018     12/29/2018     Lab Results   Component Value Date    GLUCOSE 139 12/22/2015    CALCIUM 8 2 (L) 12/29/2018     01/15/2018    K 3 5 12/29/2018    CO2 27 12/29/2018     12/29/2018    BUN 40 (H) 12/29/2018    CREATININE 1 26 12/29/2018         Invasive Devices     Peripheral Intravenous Line            Peripheral IV 12/28/18 Left;Upper Arm less than 1 day                Physical Exam:   General: alert, cooperative and no distress  Lungs: Normal respiratory effort, no accessory muscle use  Heart: regular rate and rhythm   Abdomen: soft, non-tender  Extremity: Left 4th toe amputation incision is stable with mild serous drainage  Sutures intact  NO significant dehiscence  NO cellulitis  The left heel wound is minimal with little drainage  Lab, Imaging and other studies:     Results from last 7 days  Lab Units 12/27/18  1439 12/25/18  1446   GRAM STAIN RESULT  No polys seen  1+ Gram positive cocci in pairs No polys seen  3+ Gram negative rods  2+ Gram positive rods  2+ Gram positive cocci in pairs  2+ Gram positive cocci in clusters         Results from last 7 days  Lab Units 12/25/18  1509 12/25/18  1441   BLOOD CULTURE  No Growth at 72 hrs  No Growth at 72 hrs  Invalid input(s): Justina Mott      Results from last 7 days  Lab Units 12/27/18  1439   ANAEROBIC CULTURE  Culture results to follow  Results from last 7 days  Lab Units 12/25/18  1446   WOUND CULTURE  3+ Growth of Staphylococcus aureus*  2+ Growth of      Imaging: I have personally reviewed pertinent films in PACS  EKG, Pathology, and Other Studies: I have personally reviewed pertinent reports  Portions of the record may have been created with voice recognition software  Occasional wrong word or "sound a like" substitutions may have occurred due to the inherent limitations of voice recognition software  Read the chart carefully and recognize, using context, where substitutions have occurred

## 2018-12-29 NOTE — ASSESSMENT & PLAN NOTE
· Appreciate recommendations from Podiatry  · Status post left 4th toe amputation 12/27/2018  · Lower extremity arterial duplex bilateral --> no evidence of significant right lower extremity arterial occlusive disease  There was less than 50% stenosis within the proximal and mid superficial femoral arteries of the left lower extremity and evidence of small vessel disease    Compared to LEADs in 07/2017 there was a decrease in the left STALIN and vascular recommends repeat testing in 1 year  · Follow-up operative cultures per Infectious Disease  · Transition IV to PO abx through 1/6/19  · OK to start [facility] per podiatry   · Weight-bearing as tolerated in surgical shoe  · Was discharged with home PT/OT and VNA

## 2018-12-29 NOTE — DISCHARGE SUMMARY
Discharge- Hattie Goldstein [facility] 1950, 79 y o  male MRN: [id number]  Unit/Bed#: [facility] [place] [address] Encounter: [id number] DOS: 12/29/18  Primary Care Provider: Aneudy Alas MD   Date and time admitted to hospital: 12/25/2018  2:09 PM    * Diabetic foot ulcer ([address] )   Assessment & Plan    · Appreciate recommendations from Podiatry  · Status post left 4th toe amputation 12/27/2018  · Lower extremity arterial duplex bilateral --> no evidence of significant right lower extremity arterial occlusive disease  There was less than 50% stenosis within the proximal and mid superficial femoral arteries of the left lower extremity and evidence of small vessel disease  Compared to LEADs in 07/2017 there was a decrease in the left STALIN and vascular recommends repeat testing in 1 year  · Follow-up operative cultures per Infectious Disease  · Transition IV to PO abx through 1/6/19  · OK to start [facility] per podiatry   · Weight-bearing as tolerated in surgical shoe  · Was discharged with home PT/OT and VNA     Chronic atrial fibrillation (HCC)   Assessment & Plan    · Continue Lopressor 25 mg q 12  · Prescription sent for Eliquis but this is unaffordable for the patient  · Patient has been noncompliant with Coumadin in the past but is agreeable at this point given evidence of chronic left lower extremity DVT  · Continue Coumadin with Lovenox bridge upon d/c - pt states 49$ is affordable   · Will d/c with 5mg tabs and 2mg tabs    · Asked patient to take 10mg daily until INR check on mon   · I called Dr Huerta Query office to notify them he will need INR monitoring Friday   · INR on Monday with Visiting nurse      Diabetic ulcer of toe of left foot associated with type 2 diabetes mellitus, limited to breakdown of skin [facility])   Assessment & Plan    Lab Results   Component Value Date    HGBA1C 9 4 (H) 12/27/2018     Recent Labs      12/28/18   1118  12/28/18   1633  12/28/18   2049  12/29/18   0605   POCGLU  74  115  201*  87     Blood Sugar Average: Last 72 hrs:  (P) 134 [id number]   · Appreciate Endocrinology recommendations  · Still working closely with case management to determine most cost effective plan for discharge in regards to patient's blood insulin  · Prescription sent for Humalog pen and basilar pens and this was non formulary for patient's prescription plan  · Prescription sent for Novolin 70/30 45 unit b i d  where patient can get [employer] rel[employer] brand  Rx sent for diabetic supplies to include syringes and needles  Patient states this will be affordable  Chronic diastolic congestive heart failure (HCC)   Assessment & Plan    · Continue beta-blocker, Arb, torsemide     Morbid obesity ([address] )   Assessment & Plan    · Recommend dietary and lifestyle modifications       Discharging Physician / Practitioner: Stefanie Brantley PA-C  PCP: Edwina Melendez MD  Admission Date:   Admission Orders     Ordered        12/25/18 1545  Inpatient Admission (expected length of stay for this patient is greater than two midnights)  Once             Discharge Date: 12/29/18    Resolved Problems  Date Reviewed: 12/29/2018    None        Consultations During Hospital Stay:  · Endocrinology   · Podiatry  · Infectious disease     Procedures Performed:   · Amputation of distal 4th toe left foot with Podiatry 12/27    Significant Findings / Test Results:   · X-ray left foot- no radiographic evidence of osteomyelitis  Moderate diffuse soft tissue swelling around the dorsum of the foot  Moderate lisfranc osteoarthritis may be related to Charcots arthrapathy  · Venous duplex left lower extremity- no evidence of thrombus in the common femoral vein on the right  The but there is evidence of chronic nonocclusive DVT of the gastrocnemius veins left lower extremity  · Arterial duplex bilateral lower extremities- dose significant lower extremity arterial occlusive disease on the right    Less than 50% stenosis within the proximal and mid superficial femoral artery on the left noted  Repeat arterial duplex in 1 year recommended  · X-ray left foot- status post amputation of digit  · staphylcoccus aureus on wound culture and operative cultures  · None reactive hepatitis-C  · Hemoglobin A1c 9 4%  · Blood cultures negative at 72 hours    Incidental Findings:   · None    Test Results Pending at Discharge (will require follow up): · None     Outpatient Tests Requested:  · Follow-up with Podiatry and [place] [id number]  · Follow-up with PCP  · Repeat arterial duplex in 1 year  · Repeat PT INR on Monday 84/15    Complications:  None    Reason for Admission:  Toe infection    Hospital Course:     Yessy Mojica is a 79 y o  male patient with past medical history significant for medical noncompliance, chronic AFib, hypertension, morbid obesity, chronic heart failure, GERD, hyperlipidemia, uncontrolled type 2 diabetes mellitus who originally presented to the hospital on 12/25/2018 due to left toe infection  He was started on broad-spectrum IV antibiotics and Infectious Disease and Podiatry were consulted  Lower extremity arterial duplex revealed peripheral arterial disease  Left lower extremity venous duplex revealed chronic DVT  He underwent left great toe amputation with Podiatry on 12/27  Wound cultures and operative cultures were growing methicillin-susceptible Staph aureus  He was transitioned from IV antibiotics to p  O  In addition patient was found of chronic DVT and given his atrial fibrillation he was restarted on Coumadin and he was agreeable to this  Patient will be discharged with 5 day course of Lovenox injections for bridge to Coumadin  INR will be redrawn on Monday and sent to his cardiologist office for management  I notified Cardiology on Friday 12/28 of medication change  Of note, Eliquis was not affordable for the patient  Patient reported noncompliance with his insulin regimen because he could not afford it    Patient was were in multiple prescriptions, particularly Basaglar and apidra pens which were non formulary with his prescription plan and not covered  Patient was then transitioned to 70 30 Novolin b i d  Dosing which he is agreeable to  He does not wish to follow up with Endocrinology because he states his PCP can manage it  Patient is medically stable for discharge home with home VNA and PT/OT  He expressed understanding and agreed with the above plan  Please see above list of diagnoses and related plan for additional information  Condition at Discharge: stable     Discharge Day Visit / Exam:     Subjective:  Denies pain in foot  Agreeable to cost of insulin and lovenox  Vitals: Blood Pressure: 122/67 (12/29/18 0741)  Pulse: 68 (12/29/18 0741)  Temperature: 98 1 °F (36 7 °C) (12/29/18 0741)  Temp Source: Oral (12/29/18 0741)  Respirations: 18 (12/29/18 0741)  Height: 6' 3" (190 5 cm) (12/25/18 1712)  Weight - Scale: (!) 160 kg (353 lb 13 4 oz) (12/29/18 0741)  SpO2: 92 % (12/29/18 0741)    Exam:   Physical Exam   Constitutional: He is oriented to person, place, and time  He appears well-developed and well-nourished  Morbidly obese   HENT:   Head: Normocephalic and atraumatic  Mouth/Throat: Oropharynx is clear and moist    Eyes: EOM are normal  No scleral icterus  Neck: Normal range of motion  Neck supple  Cardiovascular: Normal rate, regular rhythm and normal heart sounds  No murmur heard  Pulmonary/Chest: Effort normal and breath sounds normal    Decreased at bases   Abdominal: Soft  Bowel sounds are normal    Musculoskeletal: Normal range of motion  He exhibits edema  Neurological: He is alert and oriented to person, place, and time  Skin: Skin is warm and dry  Surgical shoe left foot  Venous stasis changes to bilateral lower extremities  Psychiatric: He has a normal mood and affect       Discussion with Family: spoke with son via phone     Discharge instructions/Information to patient and family:   See after visit summary for information provided to patient and family  Provisions for Follow-Up Care:  See after visit summary for information related to follow-up care and any pertinent home health orders  Disposition:     Home with VNA Services (Reminder: Complete face to face encounter)    For Discharges to [facility]:   · Not Applicable to this Patient - Not Applicable to this Patient    Planned Readmission: none      Discharge Statement:  I spent 45 minutes discharging the patient  This time was spent on the day of discharge  I had direct contact with the patient on the day of discharge  Greater than 50% of the total time was spent examining patient, answering all patient questions, arranging and discussing plan of care with patient as well as directly providing post-discharge instructions  Additional time then spent on discharge activities  Discharge Medications:  See after visit summary for reconciled discharge medications provided to patient and family        ** Please Note: This note has been constructed using a voice recognition system **

## 2018-12-30 LAB
BACTERIA BLD CULT: NORMAL
BACTERIA BLD CULT: NORMAL
BACTERIA TISS AEROBE CULT: ABNORMAL
BACTERIA TISS AEROBE CULT: ABNORMAL
GRAM STN SPEC: ABNORMAL
GRAM STN SPEC: ABNORMAL

## 2018-12-31 ENCOUNTER — TELEPHONE (OUTPATIENT)
Dept: CARDIOLOGY CLINIC | Facility: CLINIC | Age: 68
End: 2018-12-31

## 2018-12-31 ENCOUNTER — ANTICOAG VISIT (OUTPATIENT)
Dept: CARDIOLOGY CLINIC | Facility: CLINIC | Age: 68
End: 2018-12-31

## 2018-12-31 ENCOUNTER — TRANSITIONAL CARE MANAGEMENT (OUTPATIENT)
Dept: FAMILY MEDICINE CLINIC | Facility: CLINIC | Age: 68
End: 2018-12-31

## 2018-12-31 DIAGNOSIS — I48.20 CHRONIC ATRIAL FIBRILLATION (HCC): ICD-10-CM

## 2018-12-31 LAB — INR PPP: 1.1 (ref 0.86–1.17)

## 2018-12-31 NOTE — TELEPHONE ENCOUNTER
Phone call from [facility], [phone]  Reports pt has been taking warfarin 5 mg tablet and 2 mg tablet of warfarin to total 7 mg since discharge sat, as well as taking lovenox 160 mg sq every 12 hours  Has taken inr to [facility] to be drawn stat

## 2018-12-31 NOTE — PROGRESS NOTES
12/31/18, emilee sewell at [facility] cardiology office  She receieved call on 12/28  Pt was discharged on lovenox bridging, 160 mg q 12 hours and warfarin 10 mg daily, however, spoke with vna nurse today, she reports patient has been taking only total of 7 mg sat/sun  He has been taking lovenox 160 mg every 12 hours as per nurse  She sai inr today, sent as stat to hn  Requested nurse she also draw inr wed with next home visit  Will watch for inr result from today   kathya

## 2019-01-02 ENCOUNTER — ANTICOAG VISIT (OUTPATIENT)
Dept: CARDIOLOGY CLINIC | Facility: CLINIC | Age: 69
End: 2019-01-02

## 2019-01-02 ENCOUNTER — LAB REQUISITION (OUTPATIENT)
Dept: LAB | Facility: HOSPITAL | Age: 69
End: 2019-01-02
Payer: COMMERCIAL

## 2019-01-02 DIAGNOSIS — I48.20 CHRONIC ATRIAL FIBRILLATION (HCC): ICD-10-CM

## 2019-01-02 DIAGNOSIS — I11.0 HYPERTENSIVE HEART DISEASE WITH HEART FAILURE (HCC): ICD-10-CM

## 2019-01-02 LAB
INR PPP: 1.21 (ref 0.86–1.17)
PROTHROMBIN TIME: 14.6 SECONDS (ref 11.8–14.2)

## 2019-01-02 PROCEDURE — 85610 PROTHROMBIN TIME: CPT | Performed by: INTERNAL MEDICINE

## 2019-01-02 NOTE — PROGRESS NOTES
1/2/19, tc to pt, reports he continued with warfarin 7 mg daily and Lovenox 160 mg q 12 hours  inr due today to be drawn by vna nurse  He uses [facility] lab  He reports he has 12 syringes of Lovenox available in home   kathya

## 2019-01-02 NOTE — PROGRESS NOTES
1/2/19, tc to pt, per dr sloane eddy, continue lovenox q 12 hours  Increase warfarin 15 mg x 2 days  inr fri  Also called to [facility]  [phone], left same message on her cell   Also faxed written instructions to ramila rasheed

## 2019-01-04 ENCOUNTER — APPOINTMENT (OUTPATIENT)
Dept: LAB | Facility: HOSPITAL | Age: 69
End: 2019-01-04
Payer: COMMERCIAL

## 2019-01-04 ENCOUNTER — ANTICOAG VISIT (OUTPATIENT)
Dept: CARDIOLOGY CLINIC | Facility: CLINIC | Age: 69
End: 2019-01-04

## 2019-01-04 DIAGNOSIS — I48.20 CHRONIC ATRIAL FIBRILLATION (HCC): ICD-10-CM

## 2019-01-04 LAB
INR PPP: 1.6 (ref 0.86–1.17)
PROTHROMBIN TIME: 18.1 SECONDS (ref 11.8–14.2)

## 2019-01-04 PROCEDURE — 36415 COLL VENOUS BLD VENIPUNCTURE: CPT

## 2019-01-04 PROCEDURE — 85610 PROTHROMBIN TIME: CPT

## 2019-01-04 NOTE — PROGRESS NOTES
1/4/19, tc to pt, left message on answering machine, reviewed with dr Elham Hermosillo, 10 mg today, 15 mg sat, 10 mg sun, inr due mon, continue lovenox q 12 hours  inr due Monday  Called to [facility] nurse, [phone], left same instructions on her cell phone as she requested   kathya

## 2019-01-07 ENCOUNTER — ANTICOAG VISIT (OUTPATIENT)
Dept: CARDIOLOGY CLINIC | Facility: CLINIC | Age: 69
End: 2019-01-07

## 2019-01-07 ENCOUNTER — APPOINTMENT (OUTPATIENT)
Dept: LAB | Facility: HOSPITAL | Age: 69
End: 2019-01-07
Payer: COMMERCIAL

## 2019-01-07 DIAGNOSIS — R74.8 ABNORMAL LEVELS OF OTHER SERUM ENZYMES: ICD-10-CM

## 2019-01-07 DIAGNOSIS — E11.9 TYPE 2 DIABETES MELLITUS WITHOUT COMPLICATIONS (HCC): ICD-10-CM

## 2019-01-07 DIAGNOSIS — I48.20 CHRONIC ATRIAL FIBRILLATION (HCC): ICD-10-CM

## 2019-01-07 PROBLEM — I73.9 PERIPHERAL VASCULAR DISEASE (HCC): Status: ACTIVE | Noted: 2019-01-07

## 2019-01-07 LAB
ANION GAP SERPL CALCULATED.3IONS-SCNC: 11 MMOL/L (ref 4–13)
BUN SERPL-MCNC: 49 MG/DL (ref 5–25)
CALCIUM SERPL-MCNC: 9.4 MG/DL (ref 8.3–10.1)
CHLORIDE SERPL-SCNC: 103 MMOL/L (ref 100–108)
CO2 SERPL-SCNC: 26 MMOL/L (ref 21–32)
CREAT SERPL-MCNC: 1.34 MG/DL (ref 0.6–1.3)
GFR SERPL CREATININE-BSD FRML MDRD: 55 ML/MIN/1.73SQ M
GLUCOSE SERPL-MCNC: 122 MG/DL (ref 65–140)
POTASSIUM SERPL-SCNC: 4.4 MMOL/L (ref 3.5–5.3)
SODIUM SERPL-SCNC: 140 MMOL/L (ref 136–145)

## 2019-01-07 PROCEDURE — 84075 ASSAY ALKALINE PHOSPHATASE: CPT

## 2019-01-07 PROCEDURE — 36415 COLL VENOUS BLD VENIPUNCTURE: CPT

## 2019-01-07 PROCEDURE — 80048 BASIC METABOLIC PNL TOTAL CA: CPT

## 2019-01-07 PROCEDURE — 84080 ASSAY ALKALINE PHOSPHATASES: CPT

## 2019-01-07 RX ORDER — PEN NEEDLE, DIABETIC 29 G X1/2"
NEEDLE, DISPOSABLE MISCELLANEOUS
Refills: 0 | Status: ON HOLD | COMMUNITY
Start: 2018-12-29

## 2019-01-07 RX ORDER — PEN NEEDLE, DIABETIC 32GX 5/32"
NEEDLE, DISPOSABLE MISCELLANEOUS
Refills: 3 | COMMUNITY
Start: 2018-11-23 | End: 2019-06-28 | Stop reason: SDUPTHER

## 2019-01-07 NOTE — PROGRESS NOTES
1/7/19, tc from pt, he went to lab to have blood work done today, inr was not done  He will take 10 mg warfarin today, continue with Lovenox , inr due tomorrow   kathya

## 2019-01-07 NOTE — PROGRESS NOTES
Heart Failure Follow Up Office Visit Note -     Rebecca Ortiz   79 y o    male   MRN: [id number]  [address] [facility]  [address]  [address] [address]  [phone]  [phone]    PCP: Shirin Serrano MD  Cardiologist Dr Elvira Winkler  Diagnoses and all orders for this visit:    Chronic diastolic congestive heart failure ([facility] )    Essential hypertension    Chronic atrial fibrillation ([facility] )    Cardiomyopathy, unspecified type ([facility] )    Morbid obesity ([facility] )    Diabetic ulcer of left heel associated with type 2 diabetes mellitus, with other ulcer severity ([place] [address] )    Peripheral vascular disease ([place] [address] )    Other orders  -     BD PEN NEEDLE HILARIO U/F 32G X 4 MM MISC; *5/5*INJECT UNDER THE SKIN 3 (THREE) TIMES A DAY  -     BD INSULIN SYRINGE U/F 31G X 5/16" 0 5 ML MISC; USE AS DIRECTED WITH NOVOLIN 70/30              Discussion/Plan  Chronic diastolic heart failure  Discharge weight:160 kg (353 lb 13 4 oz) (12/29/18 0741)  Discharge creatinine:  1 2 December 29  Labs January 7:  Creatinine 1 34, , BUN 49, potassium 4 4    Educated regarding adherence to a 2 sodium diet and a 1500 ml fluid restriction  He reports he does not weigh himself daily but does have scale  He reports he has been educated to do this  I suggested he back off on the who juices and sewed and concentrate more on plain water for fluids    --Beta-Blocker:  Lopressor 25 b i d   --ACEi, ARB or ARNi:  Losartan 50 mg daily  --Aldosterone Receptor Blocker:  Spironolactone 25 mg daily  --Diuretic:  Torsemide 20 b i d  Chronic atrial fibrillation-now on warfarin, for acute DVT  Goal INR 2-3 monitored by JOANN [facility]  He was also was discharged on therapeutic Lovenox    Hypertension; on Lopressor 25 b i d , losartan 50 mg daily, spironolactone 25 mg daily and torsemide 20 b i d  Hyperlipidemia 4/23/18: Total cholesterol 120, LDL 54, non HDL 82  He should be on a statin for primary prevention  his 10 year risk of CVD is 23 4% using the ASCVD risk   Will start atorvastatin 20 mg daily  Recheck lipids 6 weeks  AST/ALT normal 1226  Peripheral vascular disease-vascular recommended repeat lower extremity ABIs in 1 year  · Status post amputation left 4th toe December 27, 2018    Chronic left lower extremity DVT ;on warfarin    Diabetes mellitus type 2  Hemoglobin A1c 9  4  Consider adding an SGLT2 inhibitor-Empagliflozin or Canagliflozin with proven CV benefits ( SOTO et al   2018 [facility] expert consensus decision pathway on novel therapies for cardiovascular risk reduction in patients with type 2 diabetes and atherosclerotic cardiovascular disease: a report of the [employer] of Cardiology)     Morbid obesity  Last Spect:  March 2016  Normal perfusion  Echocardiogram March 2016 ejection fraction 60%=    Status post left 4th toe amputation  Follows tomorrow with the [address] [facility]     Follow up with Dr Gordy Paige in about 4 months  CBC, BMP and fasting lipids, prior to      HPI:   This is a 62-year-old gentleman with known chronic atrial fibrillation, hypertension and hyperlipidemia who follows with Dr Gordy Paige  He also has COPD, diabetes mellitus and a history of gout  He underwent a nuclear stress test in March of 2016 that disclosed no ischemia  His ejection fraction has been normal;  he has had no significant valve disease  His last echocardiogram was in March of 2016  His cardiologist recommended anticoagulation in the past for thromboembolism protection given his high chads Vasc score and chronic AFib  However, records indicate the patient stopped Coumadin on his own in the past    Interval history  He was  admitted 12/25 through 12/29/18 with a left toe infection  He was followed by infectious disease, endocrinology and podiatry   There was no evidence of significant arterial occlusive disease of thelower extremity; however there was less than 50% stenosis within the proximal and mid superficial femoral artery of the left lower extremity and evidence of small vessel disease  Compared to  LEADS July 2017, there was a decrease in the left STALIN  Vascular recommended repeat testing in 1 year  He underwent amputation of left 4th toe  He was found to have a chronic left lower extremity DVT and started on warfarin  Eliquis  was cost prohibitive  He was discharged with home VNA, PT and OT  He returns for hospital follow-up    He denies chest pain  He has chronic dyspnea with exertion, not worse than his usual   He does not feel foot pain  He is wearing a boot on his left foot  He is follow-up with the [address] [facility] tomorrow to remove the stitches  Past Medical History:   Diagnosis Date    [facility]est[facility] failure) [facility])     COPD (chronic obstructive pulmonary disease) ([facility])     Decubitus ulcer of heel     LAST ASSESSED [id number]    Diabetes mellitus ([facility])     History of varicose veins     Hypertension     Intermittent claudication ([facility])     Neuropathy     Seasonal allergies        Review of Systems   Constitution: Negative for chills and weakness  Cardiovascular: Positive for dyspnea on exertion  Negative for chest pain, claudication, cyanosis, irregular heartbeat, leg swelling, near-syncope, orthopnea, palpitations, paroxysmal nocturnal dyspnea and syncope  Respiratory: Negative for cough and shortness of breath  Musculoskeletal:        Denies left foot pain  He is wearing a surgical boot   Gastrointestinal: Negative for heartburn and nausea  Neurological: Negative for dizziness, focal weakness, headaches and light-headedness  All other systems reviewed and are negative  Allergies   Allergen Reactions    Latex Rash           Current Outpatient Prescriptions:     albuterol (PROVENTIL HFA,VENTOLIN HFA) 90 mcg/act inhaler, Inhale 2 puffs every 6 (six) hours as needed for wheezing, Disp: 1 Inhaler, Rfl: 0    allopurinol (ZYLOPRIM) 300 mg tablet, TAKE 1 TABLET BY MOUTH DAILY, Disp: 90 tablet, Rfl: 1    BD INSULIN SYRINGE U/F 31G X 5/16" 0 5 ML MISC, USE AS DIRECTED WITH NOVOLIN 70/30, Disp: , Rfl: 0    BD PEN NEEDLE HILARIO U/F 32G X 4 MM MISC, *5/5*INJECT UNDER THE SKIN 3 (THREE) TIMES A DAY, Disp: , Rfl: 3    cephalexin (KEFLEX) 500 mg capsule, Take 1 capsule (500 mg total) by mouth every 6 (six) hours for 34 doses Keflex 500 mg po QID and cont through 1/6/19, Disp: 34 capsule, Rfl: 0    enoxaparin (LOVENOX) 80 mg/0 8 mL, Inject 1 6 mL (160 mg total) under the skin every 12 (twelve) hours, Disp: 20 Syringe, Rfl: 0    fluticasone-vilanterol (BREO ELLIPTA) 100-25 mcg/inh inhaler, Inhale 1 puff daily, Disp: 1 Inhaler, Rfl: 0    insulin lispro (HUMALOG KWIKPEN) 100 units/mL injection pen, Inject 20 Units under the skin 3 (three) times a day with meals, Disp: 5 pen, Rfl: 0    insulin NPH-insulin regular (NovoLIN 70/30) 100 units/mL subcutaneous injection, Inject 50 Units under the skin 2 (two) times a day before meals, Disp: 10 mL, Rfl: 0    Insulin Pen Needle 31G X 5 MM MISC, by Does not apply route 2 (two) times a day for 50 days, Disp: 100 each, Rfl: 0    losartan (COZAAR) 50 mg tablet, Take 1 tablet (50 mg total) by mouth daily, Disp: 30 tablet, Rfl: 5    metFORMIN (GLUCOPHAGE) 1000 MG tablet, Take 1 tablet (1,000 mg total) by mouth 2 (two) times a day with meals, Disp: 120 tablet, Rfl: 0    metoprolol tartrate (LOPRESSOR) 25 mg tablet, TAKE 1 TABLET BY MOUTH EVERY 12 HOURS, Disp: 180 tablet, Rfl: 3    Nutritional Supplements (PROSOURCE NO CARB) LIQD, Take 1 oz by mouth 2 (two) times a day, Disp: 1 Bottle, Rfl: 2    spironolactone (ALDACTONE) 25 mg tablet, Take 1 tablet (25 mg total) by mouth daily, Disp: 30 tablet, Rfl: 0    torsemide (DEMADEX) 20 mg tablet, TAKE 1 TABLET BY MOUTH TWICE A DAY, Disp: 60 tablet, Rfl: 2    warfarin (COUMADIN) 2 mg tablet, Take 1 tablet (2 mg total) by mouth daily Please note you may need additional changes in doses based on INR , Disp: 30 tablet, Rfl: 0    warfarin (COUMADIN) 5 mg tablet, Take 1 tablet (5 mg total) by mouth daily Please note you may need additional changes in doses based on INR , Disp: 30 tablet, Rfl: 0    Social History     Social History    Marital status:      Spouse name: N/A    Number of children: 1    Years of education: N/A     Occupational History    RETIRED      Social History Main Topics    Smoking status: Never Smoker    Smokeless tobacco: Never Used    Alcohol use No    Drug use: No    Sexual activity: No     Other Topics Concern    Not on file     Social History Narrative    DENIED [address]    FEELS SAFE AT HOME    LIVES WITH FAMILY - SISTER    NO LIVING WILL    POA IN EXISTENCE     SUPPORTIVE AND SAFE    One son, 2 granddaughters           Family History   Problem Relation Age of Onset    Other Mother         CARDIAC DISORDER     Diabetes Mother     Cancer Father     Other Family         CARDIAC DISORDER     Diabetes Family     Cancer Family     Mental illness Family        Physical Exam   Constitutional: He is oriented to person, place, and time  No distress  HENT:   Head: Normocephalic and atraumatic  Eyes: Conjunctivae and EOM are normal    Neck: Normal range of motion  Neck supple  Cardiovascular: Normal rate and intact distal pulses  An irregularly irregular rhythm present  Exam reveals distant heart sounds  Chronic venous stasis changes, bilateral lower extremities, with thick hypertrophic skin  No significant edema   Pulmonary/Chest: Effort normal and breath sounds normal    Abdominal: Soft  Bowel sounds are normal    Musculoskeletal: Normal range of motion  Surgical boot left foot  Boot not removed for exam   Neurological: He is alert and oriented to person, place, and time  Skin: Skin is warm and dry  Psychiatric: He has a normal mood and affect  Nursing note and vitals reviewed  Vitals: There were no vitals taken for this visit  Wt Readings from Last 3 Encounters:   18 (!) 160 kg (353 lb 13 4 oz)   18 (!) 165 kg (364 lb 12 8 oz)   18 (!) 162 kg (358 lb)         Labs & Results:  Lab Results   Component Value Date    WBC 6 50 2018    HGB 11 3 (L) 2018    HCT 35 6 (L) 2018    MCV 91 2018     2018     BNP   Date Value Ref Range Status   2015 568 (H) 5 - 99 pg/mL Final     Comment:     The above 1 analytes were performed by [place] [id number] [address]     2015 436 (H) 5 - 99 pg/mL Final     Comment:     The above 1 analytes were performed by [place]  [address] [facility] [address]       No components found for: CHEM    Results for orders placed during the hospital encounter of 16   Echo complete with contrast if indicated    Narrative [address] [place] [address], [address] [place] [address]  [phone]    Transthoracic Echocardiogram  2D, M-mode, Doppler, and Color Doppler    Study date:  04-Mar-2016    Patient: Stephany Farias  MR number: [id number]  Account number: [de-identified]  : 1950  Age: 63 years  Gender: Male  Status: Outpatient  Location: [address] [place] [address]  Height: 75 in  Weight: 359 lb  BP: 150/ 82 mmHg    Indications: Cardiomyopathy  Diagnoses: I42 9 - Cardiomyopathy, unspecified    Sonographer:  Jefferson SOTO, RCS  Primary Physician: Lencho Pineda MD  Referring Physician:  Farzana Gilbert MD  Group:  [facility]  RN:  Gladys Renae RN  Interpreting Physician:  Candie Bryant MD    SUMMARY    PROCEDURE INFORMATION:  This was a technically difficult study  LEFT VENTRICLE:  Systolic function was normal  Ejection fraction was estimated to be 60 %    Although no diagnostic regional wall motion abnormality was identified, this  possibility cannot be completely excluded on the basis of this study  Wall thickness was mildly to moderately increased  RIGHT VENTRICLE:  The ventricle was mildly dilated  Systolic function was normal     LEFT ATRIUM:  The atrium was mildly to moderately dilated  RIGHT ATRIUM:  The atrium was mildly dilated  MITRAL VALVE:  There was mild annular calcification  There was trace regurgitation  COMPARISONS:  There has been no significant interval change  HISTORY: PRIOR HISTORY: Atrial fibrillation,Cardiomyopathy  Risk factors:  hypertension, diabetes, and hypercholesterolemia  PROCEDURE: The study was performed in the [facility]  This was a routine study  The transthoracic approach was used  The study  included complete 2D imaging, M-mode, complete spectral Doppler, and color  Doppler  Images were obtained from the parasternal, apical, subcostal, and  suprasternal notch acoustic windows  Intravenous contrast (Definity solution  [1 3 ml Definity/8 7ml normal saline solution], 3 ml) was administered to  opacify the left ventricle  Echocardiographic views were limited due to  restricted patient mobility, decreased penetration, and lung interference  This  was a technically difficult study  LEFT VENTRICLE: Size was normal  Systolic function was normal  Ejection  fraction was estimated to be 60 %  Although no diagnostic regional wall motion  abnormality was identified, this possibility cannot be completely excluded on  the basis of this study  Wall thickness was mildly to moderately increased  DOPPLER: Transmitral flow pattern: atrial fibrillation  Left ventricular  diastolic function parameters were normal for the patient's age  There was no  evidence of elevated ventricular filling pressure by Doppler parameters  RIGHT VENTRICLE: The ventricle was mildly dilated  Systolic function was  normal  Wall thickness was normal     LEFT ATRIUM: The atrium was mildly to moderately dilated      RIGHT ATRIUM: The atrium was mildly dilated  MITRAL VALVE: There was mild annular calcification  Valve structure was normal   There was normal leaflet separation  DOPPLER: The transmitral velocity was  within the normal range  There was no evidence for stenosis  There was trace  regurgitation  AORTIC VALVE: The valve was trileaflet  Leaflets exhibited normal cuspal  separation and sclerosis  DOPPLER: Transaortic velocity was within the normal  range  There was no evidence for stenosis  There was no significant  regurgitation  TRICUSPID VALVE: The valve structure was normal  There was normal leaflet  separation  DOPPLER: The transtricuspid velocity was within the normal range  There was no evidence for stenosis  There was no significant regurgitation  PULMONIC VALVE: Not well visualized  DOPPLER: The transpulmonic velocity was  within the normal range  There was no significant regurgitation  PERICARDIUM: There was no pericardial effusion  The pericardium was normal in  appearance  AORTA: The root exhibited normal size  SYSTEMIC VEINS: IVC: The inferior vena cava was normal in size      SYSTEM MEASUREMENT TABLES    2D  %FS: 30 03 %  Ao Diam: 3 88 cm  EF(Teich): 57 27 %  IVSd: 1 4 cm  LA Area: 29 12 cm2  LA Diam: 4 08 cm  LVEDV MOD A4C: 136 17 ml  LVEF MOD A4C: 52 04 %  LVESV MOD A4C: 65 3 ml  LVIDd: 4 68 cm  LVIDs: 3 27 cm  LVLd A4C: 8 76 cm  LVLs A4C: 7 97 cm  LVOT Diam: 2 52 cm  LVPWd: 1 3 cm  RA Area: 28 21 cm2  RVIDd: 4 57 cm  SV MOD A4C: 70 87 ml  SV(Teich): 57 94 ml    [employer] Accredited Echocardiography Laboratory    Prepared and electronically signed by    Anna Marie Hargrove MD  Signed [id number] 09:23:38

## 2019-01-08 ENCOUNTER — TRANSCRIBE ORDERS (OUTPATIENT)
Dept: ADMINISTRATIVE | Facility: HOSPITAL | Age: 69
End: 2019-01-08

## 2019-01-08 ENCOUNTER — ANTICOAG VISIT (OUTPATIENT)
Dept: CARDIOLOGY CLINIC | Facility: CLINIC | Age: 69
End: 2019-01-08

## 2019-01-08 ENCOUNTER — APPOINTMENT (OUTPATIENT)
Dept: LAB | Facility: HOSPITAL | Age: 69
End: 2019-01-08
Attending: INTERNAL MEDICINE
Payer: COMMERCIAL

## 2019-01-08 ENCOUNTER — OFFICE VISIT (OUTPATIENT)
Dept: CARDIOLOGY CLINIC | Facility: CLINIC | Age: 69
End: 2019-01-08
Payer: COMMERCIAL

## 2019-01-08 ENCOUNTER — TELEPHONE (OUTPATIENT)
Dept: CARDIOLOGY CLINIC | Facility: CLINIC | Age: 69
End: 2019-01-08

## 2019-01-08 VITALS
WEIGHT: 315 LBS | SYSTOLIC BLOOD PRESSURE: 120 MMHG | HEIGHT: 75 IN | BODY MASS INDEX: 39.17 KG/M2 | HEART RATE: 76 BPM | DIASTOLIC BLOOD PRESSURE: 64 MMHG

## 2019-01-08 DIAGNOSIS — I50.32 CHRONIC DIASTOLIC CONGESTIVE HEART FAILURE (HCC): Primary | ICD-10-CM

## 2019-01-08 DIAGNOSIS — E66.01 MORBID OBESITY (HCC): ICD-10-CM

## 2019-01-08 DIAGNOSIS — I73.9 PERIPHERAL VASCULAR DISEASE (HCC): ICD-10-CM

## 2019-01-08 DIAGNOSIS — I48.20 CHRONIC ATRIAL FIBRILLATION (HCC): ICD-10-CM

## 2019-01-08 DIAGNOSIS — E11.621 DIABETIC ULCER OF LEFT HEEL ASSOCIATED WITH TYPE 2 DIABETES MELLITUS, WITH OTHER ULCER SEVERITY (HCC): ICD-10-CM

## 2019-01-08 DIAGNOSIS — E78.5 HYPERLIPIDEMIA LDL GOAL <70: ICD-10-CM

## 2019-01-08 DIAGNOSIS — I10 ESSENTIAL HYPERTENSION: ICD-10-CM

## 2019-01-08 DIAGNOSIS — L97.428 DIABETIC ULCER OF LEFT HEEL ASSOCIATED WITH TYPE 2 DIABETES MELLITUS, WITH OTHER ULCER SEVERITY (HCC): ICD-10-CM

## 2019-01-08 DIAGNOSIS — I42.9 CARDIOMYOPATHY, UNSPECIFIED TYPE (HCC): ICD-10-CM

## 2019-01-08 DIAGNOSIS — I48.20 CHRONIC ATRIAL FIBRILLATION (HCC): Primary | ICD-10-CM

## 2019-01-08 LAB
INR PPP: 2.57 (ref 0.86–1.17)
PROTHROMBIN TIME: 26.2 SECONDS (ref 11.8–14.2)

## 2019-01-08 PROCEDURE — 36415 COLL VENOUS BLD VENIPUNCTURE: CPT

## 2019-01-08 PROCEDURE — 99214 OFFICE O/P EST MOD 30 MIN: CPT | Performed by: NURSE PRACTITIONER

## 2019-01-08 PROCEDURE — 85610 PROTHROMBIN TIME: CPT

## 2019-01-08 PROCEDURE — 93000 ELECTROCARDIOGRAM COMPLETE: CPT | Performed by: NURSE PRACTITIONER

## 2019-01-08 RX ORDER — ATORVASTATIN CALCIUM 20 MG/1
20 TABLET, FILM COATED ORAL DAILY
Qty: 90 TABLET | Refills: 0 | Status: SHIPPED | OUTPATIENT
Start: 2019-01-08 | End: 2019-01-10 | Stop reason: SDUPTHER

## 2019-01-08 NOTE — LETTER
January 8, 2019     Yessi Marie MD  [address]  [address]    Patient: Gabriel Helton   YOB: 1950   Date of Visit: 1/8/2019       Dear Dr Norma Reevesing:    Thank you for referring Janette Barbour to me for evaluation  Below are my notes for this consultation  If you have questions, please do not hesitate to call me  I look forward to following your patient along with you  Sincerely,        NICOLA Núñez        CC: No Recipients  NICOLA Núñez  1/8/2019 10:26 AM  Sign at close encounter  Heart Failure Follow Up Office Visit Note -     Gabriel Helton   79 y o    male   MRN: [id number]  [place] [id number] [address]  [phone]  [phone]    PCP: Yessi Marie MD  Cardiologist Dr Reilly Rice          Assessment  Diagnoses and all orders for this visit:    Chronic diastolic congestive heart failure ([address] )    Essential hypertension    Chronic atrial fibrillation ([address] )    Cardiomyopathy, unspecified type ([address] )    Morbid obesity ([address] )    Diabetic ulcer of left heel associated with type 2 diabetes mellitus, with other ulcer severity ([address] )    Peripheral vascular disease ([address] )    Other orders  -     BD PEN NEEDLE HILARIO U/F 32G X 4 MM MISC; *5/5*INJECT UNDER THE SKIN 3 (THREE) TIMES A DAY  -     BD INSULIN SYRINGE U/F 31G X 5/16" 0 5 ML MISC; USE AS DIRECTED WITH NOVOLIN 70/30              Discussion/Plan  Chronic diastolic heart failure  Discharge weight:160 kg (353 lb 13 4 oz) (12/29/18 9141)  Discharge creatinine:  1 2 December 29  Labs January 7:  Creatinine 1 34, , BUN 49, potassium 4 4    Educated regarding adherence to a 2 sodium diet and a 1500 ml fluid restriction    --Beta-Blocker:  Lopressor 25 b i d   --ACEi, ARB or ARNi:  Losartan 50 mg daily  --Aldosterone Receptor Blocker:  Spironolactone 25 mg daily  --Diuretic:  Torsemide 20 b i d      Chronic atrial fibrillation-now on warfarin, for acute DVT  Goal INR 2-3 monitored by JOANN PERSON  He was also was discharged on therapeutic Lovenox    Hypertension; on Lopressor 25 b i d , losartan 50 mg daily, spironolactone 25 mg daily and torsemide 20 b i d  Hyperlipidemia 4/23/18: Total cholesterol 120, LDL 54, non HDL 82  He should be on a statin for primary prevention  his 10 year risk of CVD is 23 4% using the ASCVD risk   Will start atorvastatin 20 mg daily  Recheck lipids 6 weeks  AST/ALT normal 1226  Peripheral vascular disease-vascular recommended repeat lower extremity ABIs in 1 year  · Status post amputation left 4th toe December 27, 2018    Chronic left lower extremity DVT ;on warfarin    Diabetes mellitus type 2  Hemoglobin A1c 9  4  Consider adding an SGLT2 inhibitor-Empagliflozin or Canagliflozin with proven CV benefits ( SOTO et al   2018 [facility] expert consensus decision pathway on novel therapies for cardiovascular risk reduction in patients with type 2 diabetes and atherosclerotic cardiovascular disease: a report of the [employer] of Cardiology)     Morbid obesity  Last Spect:  March 2016  Normal perfusion  Echocardiogram March 2016 ejection fraction 60%=    Status post left 4th toe amputation  [facility]           HPI:   This is a 57-year-old gentleman with known chronic atrial fibrillation, hypertension and hyperlipidemia who follows with Dr Suzy Schwartz  He also has COPD, diabetes mellitus and a history of gout  He underwent a nuclear stress test in March of 2016 that disclosed no ischemia  His ejection fraction has been normal;  he has had no significant valve disease  His last echocardiogram was in March of 2016  His cardiologist recommended anticoagulation in the past for thromboembolism protection given his high chads Vasc score and chronic AFib    However, records indicate the patient stopped Coumadin on his own in the past    Interval history  He was  admitted 12/25 through 12/29/18 with a left toe infection  He was followed by infectious disease, endocrinology and podiatry  There was no evidence of significant arterial occlusive disease of thelower extremity; however there was less than 50% stenosis within the proximal and mid superficial femoral artery of the left lower extremity and evidence of small vessel disease  Compared to  LEADS July 2017, there was a decrease in the left STALIN  Vascular recommended repeat testing in 1 year  He underwent amputation of left 4th toe  He was found to have a chronic left lower extremity DVT and started on warfarin  Eliquis  was cost prohibitive  He was discharged with home VNA, PT and OT  He returns for hospital follow-up    He denies chest pain  He has chronic dyspnea with exertion, not worse than his usual   He does not feel foot pain  He is wearing a boot on his left foot  He is follow-up with the [address] [facility] tomorrow to remove the stitches  Past Medical History:   Diagnosis Date    CHF (congestive heart failure) ([facility])     COPD (chronic obstructive pulmonary disease) ([facility])     Decubitus ulcer of heel     LAST ASSESSED [id number]    Diabetes mellitus ([facility])     History of varicose veins     Hypertension     Intermittent claudication ([facility])     Neuropathy     Seasonal allergies        ROS    Allergies   Allergen Reactions    Latex Rash           Current Outpatient Prescriptions:     albuterol (PROVENTIL HFA,VENTOLIN HFA) 90 mcg/act inhaler, Inhale 2 puffs every 6 (six) hours as needed for wheezing, Disp: 1 Inhaler, Rfl: 0    allopurinol (ZYLOPRIM) 300 mg tablet, TAKE 1 TABLET BY MOUTH DAILY, Disp: 90 tablet, Rfl: 1    BD INSULIN SYRINGE U/F 31G X 5/16" 0 5 ML MISC, USE AS DIRECTED WITH NOVOLIN 70/30, Disp: , Rfl: 0    BD PEN NEEDLE HILARIO U/F 32G X 4 MM MISC, *5/5*INJECT UNDER THE SKIN 3 (THREE) TIMES A DAY, Disp: , Rfl: 3    cephalexin (KEFLEX) 500 mg capsule, Take 1 capsule (500 mg total) by mouth every 6 (six) hours for 34 doses Keflex 500 mg po QID and cont through 1/6/19, Disp: 34 capsule, Rfl: 0    enoxaparin (LOVENOX) 80 mg/0 8 mL, Inject 1 6 mL (160 mg total) under the skin every 12 (twelve) hours, Disp: 20 Syringe, Rfl: 0    fluticasone-vilanterol (BREO ELLIPTA) 100-25 mcg/inh inhaler, Inhale 1 puff daily, Disp: 1 Inhaler, Rfl: 0    insulin lispro (HUMALOG KWIKPEN) 100 units/mL injection pen, Inject 20 Units under the skin 3 (three) times a day with meals, Disp: 5 pen, Rfl: 0    insulin NPH-insulin regular (NovoLIN 70/30) 100 units/mL subcutaneous injection, Inject 50 Units under the skin 2 (two) times a day before meals, Disp: 10 mL, Rfl: 0    Insulin Pen Needle 31G X 5 MM MISC, by Does not apply route 2 (two) times a day for 50 days, Disp: 100 each, Rfl: 0    losartan (COZAAR) 50 mg tablet, Take 1 tablet (50 mg total) by mouth daily, Disp: 30 tablet, Rfl: 5    metFORMIN (GLUCOPHAGE) 1000 MG tablet, Take 1 tablet (1,000 mg total) by mouth 2 (two) times a day with meals, Disp: 120 tablet, Rfl: 0    metoprolol tartrate (LOPRESSOR) 25 mg tablet, TAKE 1 TABLET BY MOUTH EVERY 12 HOURS, Disp: 180 tablet, Rfl: 3    Nutritional Supplements (PROSOURCE NO CARB) LIQD, Take 1 oz by mouth 2 (two) times a day, Disp: 1 Bottle, Rfl: 2    spironolactone (ALDACTONE) 25 mg tablet, Take 1 tablet (25 mg total) by mouth daily, Disp: 30 tablet, Rfl: 0    torsemide (DEMADEX) 20 mg tablet, TAKE 1 TABLET BY MOUTH TWICE A DAY, Disp: 60 tablet, Rfl: 2    warfarin (COUMADIN) 2 mg tablet, Take 1 tablet (2 mg total) by mouth daily Please note you may need additional changes in doses based on INR , Disp: 30 tablet, Rfl: 0    warfarin (COUMADIN) 5 mg tablet, Take 1 tablet (5 mg total) by mouth daily Please note you may need additional changes in doses based on INR , Disp: 30 tablet, Rfl: 0    Social History     Social History    Marital status:      Spouse name: N/A    Number of children: 1    Years of education: N/A Occupational History    RETIRED      Social History Main Topics    Smoking status: Never Smoker    Smokeless tobacco: Never Used    Alcohol use No    Drug use: No    Sexual activity: No     Other Topics Concern    Not on file     Social History Narrative    DENIED ACTIVE ADVANCED DIRECTIVE    ALWAYS USES [employer]    CAFFEINE USE    [employer] [address]     FEELS SAFE AT HOME    LIVES WITH FAMILY - SISTER    NO LIVING WILL    POA IN EXISTENCE     SUPPORTIVE AND SAFE    One son, 2 granddaughters           Family History   Problem Relation Age of Onset    Other Mother         CARDIAC DISORDER     Diabetes Mother     Cancer Father     Other Family         CARDIAC DISORDER     Diabetes Family     Cancer Family     Mental illness Family        Physical Exam    Vitals: There were no vitals taken for this visit     Wt Readings from Last 3 Encounters:   18 (!) 160 kg (353 lb 13 4 oz)   18 (!) 165 kg (364 lb 12 8 oz)   18 (!) 162 kg (358 lb)         Labs & Results:  Lab Results   Component Value Date    WBC 6 50 2018    HGB 11 3 (L) 2018    HCT 35 6 (L) 2018    MCV 91 2018     2018     BNP   Date Value Ref Range Status   2015 568 (H) 5 - 99 pg/mL Final     Comment:     The above 1 analytes were performed by [facility]  [address] [address]     2015 436 (H) 5 - 99 pg/mL Final     Comment:     The above 1 analytes were performed by [facility]  [address] [place] [address]       No components found for: CHEM    Results for orders placed during the hospital encounter of 16   Echo complete with contrast if indicated    Narrative [address] [facility]  [facility], [address] [facility]  [phone]    Transthoracic Echocardiogram  2D, M-mode, Doppler, and Color Doppler    Study date:  04-Mar-2016    Patient: Danyelle Stevens  MR number: [id number]  Account number: [de-identified]  : 1950  Age: 63 years  Gender: Male  Status: Outpatient  Location: [facility] [place]  Height: 75 in  Weight: 359 lb  BP: 150/ 82 mmHg    Indications: Cardiomyopathy  Diagnoses: I42 9 - Cardiomyopathy, unspecified    Sonographer:  Bruna SOTO, RCS  Primary Physician: Neli Triplett MD  Referring Physician:  Alvino Baird MD  Group:  [facility]  RN:  Sushma Shore RN  Interpreting Physician:  Adriane Dejesus MD    SUMMARY    PROCEDURE INFORMATION:  This was a technically difficult study  LEFT VENTRICLE:  Systolic function was normal  Ejection fraction was estimated to be 60 %  Although no diagnostic regional wall motion abnormality was identified, this  possibility cannot be completely excluded on the basis of this study  Wall thickness was mildly to moderately increased  RIGHT VENTRICLE:  The ventricle was mildly dilated  Systolic function was normal     LEFT ATRIUM:  The atrium was mildly to moderately dilated  RIGHT ATRIUM:  The atrium was mildly dilated  MITRAL VALVE:  There was mild annular calcification  There was trace regurgitation  COMPARISONS:  There has been no significant interval change  HISTORY: PRIOR HISTORY: Atrial fibrillation,Cardiomyopathy  Risk factors:  hypertension, diabetes, and hypercholesterolemia  PROCEDURE: The study was performed in the [facility]  This was a routine study  The transthoracic approach was used  The study  included complete 2D imaging, M-mode, complete spectral Doppler, and color  Doppler  Images were obtained from the parasternal, apical, subcostal, and  suprasternal notch acoustic windows  Intravenous contrast (Definity solution  [1 3 ml Definity/8 7ml normal saline solution], 3 ml) was administered to  opacify the left ventricle  Echocardiographic views were limited due to  restricted patient mobility, decreased penetration, and lung interference   This  was a technically difficult study  LEFT VENTRICLE: Size was normal  Systolic function was normal  Ejection  fraction was estimated to be 60 %  Although no diagnostic regional wall motion  abnormality was identified, this possibility cannot be completely excluded on  the basis of this study  Wall thickness was mildly to moderately increased  DOPPLER: Transmitral flow pattern: atrial fibrillation  Left ventricular  diastolic function parameters were normal for the patient's age  There was no  evidence of elevated ventricular filling pressure by Doppler parameters  RIGHT VENTRICLE: The ventricle was mildly dilated  Systolic function was  normal  Wall thickness was normal     LEFT ATRIUM: The atrium was mildly to moderately dilated  RIGHT ATRIUM: The atrium was mildly dilated  MITRAL VALVE: There was mild annular calcification  Valve structure was normal   There was normal leaflet separation  DOPPLER: The transmitral velocity was  within the normal range  There was no evidence for stenosis  There was trace  regurgitation  AORTIC VALVE: The valve was trileaflet  Leaflets exhibited normal cuspal  separation and sclerosis  DOPPLER: Transaortic velocity was within the normal  range  There was no evidence for stenosis  There was no significant  regurgitation  TRICUSPID VALVE: The valve structure was normal  There was normal leaflet  separation  DOPPLER: The transtricuspid velocity was within the normal range  There was no evidence for stenosis  There was no significant regurgitation  PULMONIC VALVE: Not well visualized  DOPPLER: The transpulmonic velocity was  within the normal range  There was no significant regurgitation  PERICARDIUM: There was no pericardial effusion  The pericardium was normal in  appearance  AORTA: The root exhibited normal size  SYSTEMIC VEINS: IVC: The inferior vena cava was normal in size      SYSTEM MEASUREMENT TABLES    2D  %FS: 30 03 %  Ao Diam: 3 88 cm  EF(Teich): 57 27 %  IVSd: 1 4 cm  LA Area: 29 12 cm2  LA Diam: 4 08 cm  LVEDV MOD A4C: 136 17 ml  LVEF MOD A4C: 52 04 %  LVESV MOD A4C: 65 3 ml  LVIDd: 4 68 cm  LVIDs: 3 27 cm  LVLd A4C: 8 76 cm  LVLs A4C: 7 97 cm  LVOT Diam: 2 52 cm  LVPWd: 1 3 cm  RA Area: 28 21 cm2  RVIDd: 4 57 cm  SV MOD A4C: 70 87 ml  SV(Teich): 57 94 ml    [facility] Accredited Echocardiography Laboratory    Prepared and electronically signed by    Monet George MD  Signed [id number] 09:23:38

## 2019-01-08 NOTE — PROGRESS NOTES
1/8/19, tc to pt, left message on answering machine, may stop lovenox since inr 2 5  continue 10 mg warfarin daily, inr due thurs  [facility] to [facility], spoke with kilo  Nurse will draw inr sami rasheed

## 2019-01-08 NOTE — TELEPHONE ENCOUNTER
Received inr results today  inr done today, 2 5  Called to patient, left message on answering machine, may stop Lovenox injection ,inr now therapeutic  Will continue warfarin 10 mg tues/wed, inr due Thursday ,to be drawn by vna at home draw  Will update dr Celso Tuttle of above  Patient to call if any questions

## 2019-01-09 ENCOUNTER — TELEPHONE (OUTPATIENT)
Dept: FAMILY MEDICINE CLINIC | Facility: CLINIC | Age: 69
End: 2019-01-09

## 2019-01-09 LAB
ALP BONE CFR SERPL: 21 % (ref 12–68)
ALP INTEST CFR SERPL: 0 % (ref 0–18)
ALP LIVER CFR SERPL: 79 % (ref 13–88)
ALP SERPL-CCNC: 178 IU/L (ref 39–117)

## 2019-01-09 NOTE — TELEPHONE ENCOUNTER
----- Message from Cassandra Dean MD sent at 1/9/2019  2:16 PM EST -----  Review at [facility]

## 2019-01-09 NOTE — TELEPHONE ENCOUNTER
----- Message from Velvet Abbasi MD sent at 1/7/2019 10:43 AM EST -----  Kidney function worse-javed spt have upcoming ov?

## 2019-01-10 ENCOUNTER — OFFICE VISIT (OUTPATIENT)
Dept: FAMILY MEDICINE CLINIC | Facility: CLINIC | Age: 69
End: 2019-01-10
Payer: COMMERCIAL

## 2019-01-10 ENCOUNTER — ANTICOAG VISIT (OUTPATIENT)
Dept: CARDIOLOGY CLINIC | Facility: CLINIC | Age: 69
End: 2019-01-10

## 2019-01-10 VITALS
WEIGHT: 315 LBS | TEMPERATURE: 97.6 F | BODY MASS INDEX: 39.17 KG/M2 | RESPIRATION RATE: 18 BRPM | HEIGHT: 75 IN | HEART RATE: 80 BPM | DIASTOLIC BLOOD PRESSURE: 80 MMHG | SYSTOLIC BLOOD PRESSURE: 142 MMHG

## 2019-01-10 DIAGNOSIS — E78.5 HYPERLIPIDEMIA LDL GOAL <70: ICD-10-CM

## 2019-01-10 DIAGNOSIS — E66.01 MORBID OBESITY (HCC): ICD-10-CM

## 2019-01-10 DIAGNOSIS — E11.42 DIABETIC POLYNEUROPATHY ASSOCIATED WITH TYPE 2 DIABETES MELLITUS (HCC): ICD-10-CM

## 2019-01-10 DIAGNOSIS — I48.20 CHRONIC ATRIAL FIBRILLATION (HCC): ICD-10-CM

## 2019-01-10 DIAGNOSIS — E11.621 DIABETIC ULCER OF TOE OF LEFT FOOT ASSOCIATED WITH TYPE 2 DIABETES MELLITUS, LIMITED TO BREAKDOWN OF SKIN (HCC): Primary | ICD-10-CM

## 2019-01-10 DIAGNOSIS — L97.521 DIABETIC ULCER OF TOE OF LEFT FOOT ASSOCIATED WITH TYPE 2 DIABETES MELLITUS, LIMITED TO BREAKDOWN OF SKIN (HCC): Primary | ICD-10-CM

## 2019-01-10 LAB — INR PPP: 3.3 (ref 0.86–1.17)

## 2019-01-10 PROCEDURE — 99495 TRANSJ CARE MGMT MOD F2F 14D: CPT | Performed by: FAMILY MEDICINE

## 2019-01-10 RX ORDER — ATORVASTATIN CALCIUM 20 MG/1
20 TABLET, FILM COATED ORAL DAILY
Qty: 90 TABLET | Refills: 0 | Status: SHIPPED | OUTPATIENT
Start: 2019-01-10 | End: 2019-05-06 | Stop reason: SDUPTHER

## 2019-01-10 NOTE — ASSESSMENT & PLAN NOTE
Lab Results   Component Value Date    HGBA1C 9 4 (H) 12/27/2018     BS mostly less than 200-had one bs 76  No results for input(s): POCGLU in the last 72 hours      Blood Sugar Average: Last 72 hrs:

## 2019-01-10 NOTE — PROGRESS NOTES
1/10/19, tc [facility]  Meter  Will decrease dose, 5 mg th, 10 mg fri/sat, 5 mg sun, inr due mon  Also called to pt  Lm on answering machine  Pt to call if any questions   kathya

## 2019-01-10 NOTE — PROGRESS NOTES
Assessment/Plan:    Chronic atrial fibrillation (HCC)  Back on coumadin    Diabetes, polyneuropathy ([facility])  Lab Results   Component Value Date    HGBA1C 9 4 (H) 12/27/2018     BS mostly less than 200-had one bs 76  No results for input(s): POCGLU in the last 72 hours  Blood Sugar Average: Last 72 hrs:         Diagnoses and all orders for this visit:    Diabetic ulcer of toe of left foot associated with type 2 diabetes mellitus, limited to breakdown of skin ([facility] )    Diabetic polyneuropathy associated with type 2 diabetes mellitus ([facility] )    Chronic atrial fibrillation (HCC)          Subjective:      Patient ID: Kerry Lancaster is a 79 y o  male  F/u hospital after toe amputation-seeing podiatry and cardiology-back on coumadin, stitches still in because wound not 100% healed-plan is recheck Tuesday      TCM Call (since 12/10/2018)     Date and time call was made  12/31/2018  1:28 PM    Hospital care reviewed  Records reviewed    Patient was hospitialized at  [facility]    Date of Admission  12/25/18    Date of discharge  12/29/18    Diagnosis  Diabetic Foot Ulcer L    Disposition  Home    Were the patients medications reviewed and updated  No    Current Symptoms  None      TCM Call (since 12/10/2018)     Post hospital issues  Poor ADL (Activities of Daily Living) performance    Should patient be enrolled in anticoag monitoring? No    Scheduled for follow up?   Yes    Did you obtain your prescribed medications  Yes    Do you need help managing your prescriptions or medications  No    Is transportation to your appointment needed  No    I have advised the patient to call PCP with any new or worsening symptoms  Julia Dubois MA     Living Arrangements  Spouse or Significiant other    Support System  Family    The type of support provided  Emotional; Physical; Financial    Do you have social support  Yes, as much as I need    Are you recieving any outpatient services  No    Are you recieving home care services  Yes    Types of home care services  Nurse visit    Are you using any community resources  No    Current waiver services  No    Have you fallen in the last 12 months  No    Interperter language line needed  No    Counseling  Patient        The following portions of the patient's history were reviewed and updated as appropriate: allergies, current medications, past family history, past medical history, past social history, past surgical history and problem list       Review of Systems   Constitutional: Negative for activity change, appetite change and unexpected weight change  Respiratory: Negative for shortness of breath  Cardiovascular: Positive for leg swelling  Negative for chest pain  Skin: Positive for wound  Neurological: Negative for headaches  Objective:      /80 (BP Location: Left arm, Patient Position: Sitting, Cuff Size: Standard)   Pulse 80   Temp 97 6 °F (36 4 °C) (Temporal)   Resp 18   Ht 6' 3" (1 905 m)   Wt (!) 165 kg (363 lb)   BMI 45 37 kg/m²          Physical Exam   Constitutional: He appears well-developed and well-nourished  Morbid obesity   Neck: No thyromegaly present  Cardiovascular: Normal rate, regular rhythm and normal heart sounds  Pulmonary/Chest: Breath sounds normal    Musculoskeletal: He exhibits no edema  Lymphadenopathy:     He has no cervical adenopathy  Skin:   Wound l foot   Vitals reviewed  BMI Counseling: Body mass index is 45 37 kg/m²  Discussed the patient's BMI with him  The BMI is above average  BMI counseling and education was provided to the patient  Nutrition recommendations include reducing portion sizes, decreasing overall calorie intake, 3-5 servings of fruits/vegetables daily, reducing fast food intake, consuming healthier snacks, decreasing soda and/or juice intake and moderation in carbohydrate intake  Exercise recommendations include exercising 3-5 times per week

## 2019-01-10 NOTE — PATIENT INSTRUCTIONS
Would treat "hypoglycemia" if BS goes below 90, needs rx for Atorvastatin, check what insulin available at [facility]    Obesity   AMBULATORY CARE:   Obesity  is when your body mass index (BMI) is greater than 30  Your healthcare provider will use your height and weight to measure your BMI  The risks of obesity include  many health problems, such as injuries or physical disability  You may need tests to check for the following:  · Diabetes     · High blood pressure or high cholesterol     · Heart disease     · Gallbladder or liver disease     · Cancer of the colon, breast, prostate, liver, or kidney     · Sleep apnea     · Arthritis or gout  Seek care immediately if:   · You have a severe headache, confusion, or difficulty speaking  · You have weakness on one side of your body  · You have chest pain, sweating, or shortness of breath  Contact your healthcare provider if:   · You have symptoms of gallbladder or liver disease, such as pain in your upper abdomen  · You have knee or hip pain and discomfort while walking  · You have symptoms of diabetes, such as intense hunger and thirst, and frequent urination  · You have symptoms of sleep apnea, such as snoring or daytime sleepiness  · You have questions or concerns about your condition or care  Treatment for obesity  focuses on helping you lose weight to improve your health  Even a small decrease in BMI can reduce the risk for many health problems  Your healthcare provider will help you set a weight-loss goal   · Lifestyle changes  are the first step in treating obesity  These include making healthy food choices and getting regular physical activity  Your healthcare provider may suggest a weight-loss program that involves coaching, education, and therapy  · Medicine  may help you lose weight when it is used with a healthy diet and physical activity  · Surgery  can help you lose weight if you are very obese and have other health problems  There are several types of weight-loss surgery  Ask your healthcare provider for more information  Be successful losing weight:   · Set small, realistic goals  An example of a small goal is to walk for 20 minutes 5 days a week  Anther goal is to lose 5% of your body weight  · Tell friends, family members, and coworkers about your goals  and ask for their support  Ask a friend to lose weight with you, or join a weight-loss support group  · Identify foods or triggers that may cause you to overeat , and find ways to avoid them  Remove tempting high-calorie foods from your home and workplace  Place a bowl of fresh fruit on your kitchen counter  If stress causes you to eat, then find other ways to cope with stress  · Keep a diary to track what you eat and drink  Also write down how many minutes of physical activity you do each day  Weigh yourself once a week and record it in your diary  Eating changes: You will need to eat 500 to 1,000 fewer calories each day than you currently eat to lose 1 to 2 pounds a week  The following changes will help you cut calories:  · Eat smaller portions  Use small plates, no larger than 9 inches in diameter  Fill your plate half full of fruits and vegetables  Measure your food using measuring cups until you know what a serving size looks like  · Eat 3 meals and 1 or 2 snacks each day  Plan your meals in advance  [facility] and eat at home most of the time  Eat slowly  · Eat fruits and vegetables at every meal   They are low in calories and high in fiber, which makes you feel full  Do not add butter, margarine, or cream sauce to vegetables  Use herbs to season steamed vegetables  · Eat less fat and fewer fried foods  Eat more baked or grilled chicken and fish  These protein sources are lower in calories and fat than red meat  Limit fast food  Dress your salads with olive oil and vinegar instead of bottled dressing  · Limit the amount of sugar you eat    Do not drink sugary beverages  Limit alcohol  Activity changes:  Physical activity is good for your body in many ways  It helps you burn calories and build strong muscles  It decreases stress and depression, and improves your mood  It can also help you sleep better  Talk to your healthcare provider before you begin an exercise program   · Exercise for at least 30 minutes 5 days a week  Start slowly  Set aside time each day for physical activity that you enjoy and that is convenient for you  It is best to do both weight training and an activity that increases your heart rate, such as walking, bicycling, or swimming  · Find ways to be more active  Do yard work and housecleaning  Walk up the stairs instead of using elevators  Spend your leisure time going to events that require walking, such as outdoor festivals or fairs  This extra physical activity can help you lose weight and keep it off  Follow up with your healthcare provider as directed: You may need to meet with a dietitian  Write down your questions so you remember to ask them during your visits  © 2017 [facility] Information is for End User's use only and may not be sold, redistributed or otherwise used for commercial purposes  All illustrations and images included in CareNotes® are the copyrighted property of [employer]  or [employer]  The above information is an  only  It is not intended as medical advice for individual conditions or treatments  Talk to your doctor, nurse or pharmacist before following any medical regimen to see if it is safe and effective for you  Weight Management   AMBULATORY CARE:   Why it is important to manage your weight:  Being overweight increases your risk of health conditions such as heart disease, high blood pressure, type 2 diabetes, and certain types of cancer  It can also increase your risk for osteoarthritis, sleep apnea, and other respiratory problems   Aim for a slow, steady weight loss  Even a small amount of weight loss can lower your risk of health problems  How to lose weight safely:  A safe and healthy way to lose weight is to eat fewer calories and get regular exercise  You can lose up about 1 pound a week by decreasing the number of calories you eat by 500 calories each day  You can decrease calories by eating smaller portion sizes or by cutting out high-calorie foods  Read labels to find out how many calories are in the foods you eat  You can also burn calories with exercise such as walking, swimming, or biking  You will be more likely to keep weight off if you make these changes part of your lifestyle  Healthy meal plan for weight management:  A healthy meal plan includes a variety of foods, contains fewer calories, and helps you stay healthy  A healthy meal plan includes the following:  · Eat whole-grain foods more often  A healthy meal plan should contain fiber  Fiber is the part of grains, fruits, and vegetables that is not broken down by your body  Whole-grain foods are healthy and provide extra fiber in your diet  Some examples of whole-grain foods are whole-wheat breads and pastas, oatmeal, brown rice, and bulgur  · Eat a variety of vegetables every day  Include dark, leafy greens such as spinach, kale, anthony greens, and mustard greens  Eat yellow and orange vegetables such as carrots, sweet potatoes, and winter squash  · Eat a variety of fruits every day  Choose fresh or canned fruit (canned in its own juice or light syrup) instead of juice  Fruit juice has very little or no fiber  · Eat low-fat dairy foods  Drink fat-free (skim) milk or 1% milk  Eat fat-free yogurt and low-fat cottage cheese  Try low-fat cheeses such as mozzarella and other reduced-fat cheeses  · Choose meat and other protein foods that are low in fat  Choose beans or other legumes such as split peas or lentils   Choose fish, skinless poultry (chicken or turkey), or lean cuts of red meat (beef or pork)  Before you cook meat or poultry, cut off any visible fat  · Use less fat and oil  Try baking foods instead of frying them  Add less fat, such as margarine, sour cream, regular salad dressing and mayonnaise to foods  Eat fewer high-fat foods  Some examples of high-fat foods include french fries, doughnuts, ice cream, and cakes  · Eat fewer sweets  Limit foods and drinks that are high in sugar  This includes candy, cookies, regular soda, and sweetened drinks  Ways to decrease calories:   · Eat smaller portions  ¨ Use a small plate with smaller servings  ¨ Do not eat second helpings  ¨ When you eat at a restaurant, ask for a box and place half of your meal in the box before you eat  ¨ Share an entrée with someone else  · Replace high-calorie snacks with healthy, low-calorie snacks  ¨ Choose fresh fruit, vegetables, fat-free rice cakes, or air-popped popcorn instead of potato chips, nuts, or chocolate  ¨ Choose water or calorie-free drinks instead of soda or sweetened drinks  · Eat regular meals  Skipping meals can lead to overeating later in the day  Eat a healthy snack in place of a meal if you do not have time to eat a regular meal      · Do not shop for groceries when you are hungry  You may be more likely to make unhealthy food choices  Take a grocery list of healthy foods and shop after you have eaten  Exercise:  Exercise at least 30 minutes per day on most days of the week  Some examples of exercise include walking, biking, dancing, and swimming  You can also fit in more physical activity by taking the stairs instead of the elevator or parking farther away from stores  Ask your healthcare provider about the best exercise plan for you  Other things to consider as you try to lose weight:   · Be aware of situations that may give you the urge to overeat, such as eating while watching television  Find ways to avoid these situations   For example, read a book, go for a walk, or do crafts  · Meet with a weight loss support group or friends who are also trying to lose weight  This may help you stay motivated to continue working on your weight loss goals  © 2017 [address] [employer] Information is for End User's use only and may not be sold, redistributed or otherwise used for commercial purposes  All illustrations and images included in CareNotes® are the copyrighted property of [employer] , [employer]  or [employer]  The above information is an  only  It is not intended as medical advice for individual conditions or treatments  Talk to your doctor, nurse or pharmacist before following any medical regimen to see if it is safe and effective for you

## 2019-01-11 ENCOUNTER — TELEPHONE (OUTPATIENT)
Dept: FAMILY MEDICINE CLINIC | Facility: CLINIC | Age: 69
End: 2019-01-11

## 2019-01-14 ENCOUNTER — ANTICOAG VISIT (OUTPATIENT)
Dept: CARDIOLOGY CLINIC | Facility: CLINIC | Age: 69
End: 2019-01-14

## 2019-01-14 DIAGNOSIS — I48.20 CHRONIC ATRIAL FIBRILLATION (HCC): ICD-10-CM

## 2019-01-14 LAB — INR PPP: 3.1 (ref 0.86–1.17)

## 2019-01-14 RX ORDER — WARFARIN SODIUM 5 MG/1
TABLET ORAL
Qty: 60 TABLET | Refills: 3 | Status: SHIPPED | OUTPATIENT
Start: 2019-01-14 | End: 2019-05-11 | Stop reason: SDUPTHER

## 2019-01-14 NOTE — PROGRESS NOTES
1/14/19, [facility]c from [facility], meter, [phone], will decrease dose, 5 mg alt 10 mg other days   inr due 4 days   Tc to pt, left same message on answering machine, also reminded pt, of need to schedule office visit with dr Suresh Robbins [facility] kathya

## 2019-01-18 ENCOUNTER — ANTICOAG VISIT (OUTPATIENT)
Dept: CARDIOLOGY CLINIC | Facility: CLINIC | Age: 69
End: 2019-01-18

## 2019-01-18 DIAGNOSIS — I48.20 CHRONIC ATRIAL FIBRILLATION (HCC): ICD-10-CM

## 2019-01-18 LAB — INR PPP: 1.9 (ref 0.86–1.17)

## 2019-01-18 NOTE — PROGRESS NOTES
1/18/19, tc from [facility], meter results  Will take 10 mg today then continue with 10 mg alt 5 mg daily pattern  inr due thurs  Tc to pt, same instructions given to patient   kathya

## 2019-01-24 ENCOUNTER — ANTICOAG VISIT (OUTPATIENT)
Dept: CARDIOLOGY CLINIC | Facility: CLINIC | Age: 69
End: 2019-01-24

## 2019-01-24 DIAGNOSIS — I48.20 CHRONIC ATRIAL FIBRILLATION (HCC): ICD-10-CM

## 2019-01-24 LAB — INR PPP: 1.6 (ref 0.86–1.17)

## 2019-01-24 NOTE — PROGRESS NOTES
1/24/19, tc from [facility], meter, increase dose,tc to [facility], left message on answering machine,  5 mg m/w/f, 10 mg other days of the week, inr due tues   Called same to pt, kathya

## 2019-01-28 ENCOUNTER — ANTICOAG VISIT (OUTPATIENT)
Dept: CARDIOLOGY CLINIC | Facility: CLINIC | Age: 69
End: 2019-01-28

## 2019-01-28 DIAGNOSIS — I48.20 CHRONIC ATRIAL FIBRILLATION (HCC): ICD-10-CM

## 2019-01-28 LAB — INR PPP: 2.1 (ref 0.86–1.17)

## 2019-01-28 NOTE — PROGRESS NOTES
1/228/19, emilee from [facility], meter  She reports patient took 10 mg th/fri/sat, 5 mg sun  Will change dose, 10 mg t/th/sat, 5 mg other days of the week, inr due fri  Called pt with same instructions  Pt wrote instructions down on paper   kathya

## 2019-01-30 DIAGNOSIS — I10 ESSENTIAL HYPERTENSION: ICD-10-CM

## 2019-01-31 RX ORDER — SPIRONOLACTONE 25 MG/1
25 TABLET ORAL DAILY
Qty: 30 TABLET | Refills: 5 | Status: SHIPPED | OUTPATIENT
Start: 2019-01-31 | End: 2019-07-04 | Stop reason: SDUPTHER

## 2019-02-01 ENCOUNTER — ANTICOAG VISIT (OUTPATIENT)
Dept: CARDIOLOGY CLINIC | Facility: CLINIC | Age: 69
End: 2019-02-01

## 2019-02-01 DIAGNOSIS — I48.20 CHRONIC ATRIAL FIBRILLATION (HCC): ICD-10-CM

## 2019-02-01 LAB — INR PPP: 1.9 (ref 0.86–1.17)

## 2019-02-01 NOTE — PROGRESS NOTES
2/1/19, tc from [facility], meter  Will increase dose, 10 mg sun/t/th/sat, 5 mg other days of the week, inr due thurs  Called to patient, reviewed same instructions, he understood same   kathya

## 2019-02-07 ENCOUNTER — ANTICOAG VISIT (OUTPATIENT)
Dept: CARDIOLOGY CLINIC | Facility: CLINIC | Age: 69
End: 2019-02-07

## 2019-02-07 DIAGNOSIS — I48.20 CHRONIC ATRIAL FIBRILLATION (HCC): ICD-10-CM

## 2019-02-07 LAB — INR PPP: 1.9 (ref 0.86–1.17)

## 2019-02-07 NOTE — PROGRESS NOTES
2/13/19, tc from [facility], meter, will increase dose, 5 mg m/w, 10 mg other days of the week, inr due 1 week   kathya

## 2019-02-13 ENCOUNTER — ANTICOAG VISIT (OUTPATIENT)
Dept: CARDIOLOGY CLINIC | Facility: CLINIC | Age: 69
End: 2019-02-13

## 2019-02-13 DIAGNOSIS — I48.20 CHRONIC ATRIAL FIBRILLATION (HCC): ICD-10-CM

## 2019-02-13 LAB — INR PPP: 1.9 (ref 0.86–1.17)

## 2019-02-13 NOTE — PROGRESS NOTES
2/13/19, tc from [facility], [phone], pt took 5 mg not 10 mg as directed last fri  Will continue with 5 mg m/w, 10 mg other days of the week, inr due 1 week  Also called to patient given same instructions   kathya

## 2019-02-20 ENCOUNTER — ANTICOAG VISIT (OUTPATIENT)
Dept: CARDIOLOGY CLINIC | Facility: CLINIC | Age: 69
End: 2019-02-20

## 2019-02-20 DIAGNOSIS — I48.20 CHRONIC ATRIAL FIBRILLATION (HCC): ICD-10-CM

## 2019-02-20 LAB — INR PPP: 4 (ref 0.86–1.17)

## 2019-02-20 NOTE — PROGRESS NOTES
2/20/19, tc from [facility]  Meter result  denies any bleeding  Will hold x 2 days, then decrease dose, 5 mg m/w/f, 10 mg other days of the week, inr due mon  Pt to see ct surgery on 2/26  If cleared , vna will plan to discharge next week  Pt will be able to go to outside lab, he will use [facility]   kathya

## 2019-02-25 ENCOUNTER — ANTICOAG VISIT (OUTPATIENT)
Dept: CARDIOLOGY CLINIC | Facility: CLINIC | Age: 69
End: 2019-02-25

## 2019-02-25 DIAGNOSIS — I48.20 CHRONIC ATRIAL FIBRILLATION (HCC): ICD-10-CM

## 2019-02-25 LAB — INR PPP: 1.7 (ref 0.86–1.17)

## 2019-02-25 NOTE — PROGRESS NOTES
2/25/19, tc from [facility], [phone]  Meter  Pt had taken warfarin as directed   Will take 7 5 mg today in place of 5 mg, then resume previous dose, inr due fri  Tc to pt,l left message on his answering machine , same instructions  Pt to call if nay questions   kathya

## 2019-02-26 ENCOUNTER — APPOINTMENT (OUTPATIENT)
Dept: LAB | Facility: HOSPITAL | Age: 69
End: 2019-02-26
Payer: COMMERCIAL

## 2019-02-26 DIAGNOSIS — I10 ESSENTIAL HYPERTENSION: ICD-10-CM

## 2019-02-26 DIAGNOSIS — E78.5 HYPERLIPIDEMIA LDL GOAL <70: ICD-10-CM

## 2019-02-26 LAB
ANION GAP SERPL CALCULATED.3IONS-SCNC: 9 MMOL/L (ref 4–13)
BUN SERPL-MCNC: 36 MG/DL (ref 5–25)
CALCIUM SERPL-MCNC: 9 MG/DL (ref 8.3–10.1)
CHLORIDE SERPL-SCNC: 102 MMOL/L (ref 100–108)
CHOLEST SERPL-MCNC: 104 MG/DL (ref 50–200)
CO2 SERPL-SCNC: 28 MMOL/L (ref 21–32)
CREAT SERPL-MCNC: 1.38 MG/DL (ref 0.6–1.3)
ERYTHROCYTE [DISTWIDTH] IN BLOOD BY AUTOMATED COUNT: 14.6 % (ref 11.6–15.1)
GFR SERPL CREATININE-BSD FRML MDRD: 53 ML/MIN/1.73SQ M
GLUCOSE P FAST SERPL-MCNC: 141 MG/DL (ref 65–99)
HCT VFR BLD AUTO: 39.9 % (ref 36.5–49.3)
HDLC SERPL-MCNC: 37 MG/DL (ref 40–60)
HGB BLD-MCNC: 12.1 G/DL (ref 12–17)
LDLC SERPL CALC-MCNC: 48 MG/DL (ref 0–100)
MCH RBC QN AUTO: 28.1 PG (ref 26.8–34.3)
MCHC RBC AUTO-ENTMCNC: 30.3 G/DL (ref 31.4–37.4)
MCV RBC AUTO: 93 FL (ref 82–98)
NONHDLC SERPL-MCNC: 67 MG/DL
PLATELET # BLD AUTO: 272 THOUSANDS/UL (ref 149–390)
PMV BLD AUTO: 10.2 FL (ref 8.9–12.7)
POTASSIUM SERPL-SCNC: 4.6 MMOL/L (ref 3.5–5.3)
RBC # BLD AUTO: 4.31 MILLION/UL (ref 3.88–5.62)
SODIUM SERPL-SCNC: 139 MMOL/L (ref 136–145)
TRIGL SERPL-MCNC: 95 MG/DL
WBC # BLD AUTO: 6.05 THOUSAND/UL (ref 4.31–10.16)

## 2019-02-26 PROCEDURE — 80048 BASIC METABOLIC PNL TOTAL CA: CPT

## 2019-02-26 PROCEDURE — 85027 COMPLETE CBC AUTOMATED: CPT

## 2019-02-26 PROCEDURE — 36415 COLL VENOUS BLD VENIPUNCTURE: CPT

## 2019-02-26 PROCEDURE — 80061 LIPID PANEL: CPT

## 2019-03-01 ENCOUNTER — ANTICOAG VISIT (OUTPATIENT)
Dept: CARDIOLOGY CLINIC | Facility: CLINIC | Age: 69
End: 2019-03-01

## 2019-03-01 ENCOUNTER — TELEPHONE (OUTPATIENT)
Dept: CARDIOLOGY CLINIC | Facility: CLINIC | Age: 69
End: 2019-03-01

## 2019-03-01 DIAGNOSIS — I48.20 CHRONIC ATRIAL FIBRILLATION (HCC): Primary | ICD-10-CM

## 2019-03-01 DIAGNOSIS — I48.20 CHRONIC ATRIAL FIBRILLATION (HCC): ICD-10-CM

## 2019-03-01 DIAGNOSIS — M1A.9XX0 CHRONIC GOUT WITHOUT TOPHUS, UNSPECIFIED CAUSE, UNSPECIFIED SITE: ICD-10-CM

## 2019-03-01 LAB — INR PPP: 2.4 (ref 0.86–1.17)

## 2019-03-01 RX ORDER — ALLOPURINOL 300 MG/1
TABLET ORAL
Qty: 90 TABLET | Refills: 1 | Status: SHIPPED | OUTPATIENT
Start: 2019-03-01 | End: 2020-04-11

## 2019-03-01 NOTE — PROGRESS NOTES
3/1/19, tc from [facility] nurse, meter result  Nurse signed off case today  Tc to pt, left message on answering machine, continue current dose, inr due 1 week  Will mail new inr script to pt home  Pt to call office if any changes in health, medication or bleeding   kathya

## 2019-03-07 ENCOUNTER — APPOINTMENT (OUTPATIENT)
Dept: LAB | Facility: HOSPITAL | Age: 69
End: 2019-03-07
Attending: INTERNAL MEDICINE
Payer: COMMERCIAL

## 2019-03-07 ENCOUNTER — ANTICOAG VISIT (OUTPATIENT)
Dept: CARDIOLOGY CLINIC | Facility: CLINIC | Age: 69
End: 2019-03-07

## 2019-03-07 DIAGNOSIS — I48.20 CHRONIC ATRIAL FIBRILLATION (HCC): ICD-10-CM

## 2019-03-07 LAB
INR PPP: 1.75 (ref 0.86–1.17)
PROTHROMBIN TIME: 19.5 SECONDS (ref 11.8–14.2)

## 2019-03-07 PROCEDURE — 85610 PROTHROMBIN TIME: CPT

## 2019-03-07 PROCEDURE — 36415 COLL VENOUS BLD VENIPUNCTURE: CPT

## 2019-03-07 NOTE — PROGRESS NOTES
3/7/19, tc to pt, reports he may have missed Sunday dose  Will continue current dose, inr due 1 week   kathya

## 2019-03-14 ENCOUNTER — APPOINTMENT (OUTPATIENT)
Dept: LAB | Facility: HOSPITAL | Age: 69
End: 2019-03-14
Attending: INTERNAL MEDICINE
Payer: COMMERCIAL

## 2019-03-14 ENCOUNTER — ANTICOAG VISIT (OUTPATIENT)
Dept: CARDIOLOGY CLINIC | Facility: CLINIC | Age: 69
End: 2019-03-14

## 2019-03-14 DIAGNOSIS — I48.20 CHRONIC ATRIAL FIBRILLATION (HCC): ICD-10-CM

## 2019-03-14 NOTE — PROGRESS NOTES
Cardiology Follow Up    [facility]  1950  [id number]  [address] [facility]  [address] [place] [address] Drive [address] [place] [address]  [phone]    1  Chronic atrial fibrillation ([address] )     2  Chronic diastolic congestive heart failure ([address] )     3  Essential hypertension     4  Cardiomyopathy, unspecified type ([address] )     5  Pure hypercholesterolemia         Interval History:  Patient is here for a follow-up visit  He was last seen by me in November of last year  He has a history of chronic atrial fibrillation but has been reluctant and noncompliant with anticoagulant therapy  He had previously been on Coumadin but discontinued this but now is back on this in recent to  DVT  He had a venous Doppler December 26th which demonstrated chronic non occlusive thrombus in the left lower extremity  As well he had been hospitalized December 25th to 29th last year in reference to a left toe infection  He eventually underwent amputation of the left fourth toe  An arterial workup was done and demonstrated no significant occlusive disease  His most recent echocardiogram done in March 2016 demonstrated preserved LV systolic function with LVH and mild to moderate left atrial cavity enlargement  There was no significant valvular heart disease  Patient had a be nuclear stress test done March 2016 which showed no evidence of prognostically important ischemia  Patient is here today with his daughter  He has had no chest pain  He does get occasional dyspnea but he is a large man with an appliance on his left foot to help him ambulate  He does have lower extremity edema  He admits to me that he does not take his diuretic as prescribed  He sometimes uses it once a day  I instructed the patient and his family that he should be compliant with the medication and take it as instructed    He seems to have little insight into the situation      Patient Active Problem List   Diagnosis    Diabetic foot ulcer ([place] [address] )    Diabetes mellitus type 2, uncontrolled ([place] [address] )    Gout    Chronic venous hypertension w ulceration, bilateral ([place] [address] )    Hypertension    Chronic atrial fibrillation ([facility])    Morbid obesity (HCC)    Chronic diastolic congestive heart failure (HCC)    Cardiomyopathy (HCC)    Diabetes, polyneuropathy ([facility])    GERD (gastroesophageal reflux disease)    Hyperlipidemia    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, right ([facility])    Varicose veins of lower extremity with ulcer, left ([facility])    Onychomycosis    Gallstones    Blood alkaline phosphatase increased compared with prior measurement    Chronic heel ulcer, left, with fat layer exposed ([place] [address] )    Localized edema    Diabetic ulcer of toe of left foot associated with type 2 diabetes mellitus, limited to breakdown of skin ([place] [address] )    Peripheral vascular disease ([place] [address] )     Past Medical History:   Diagnosis Date    CHF (congestive heart failure) ([facility])     COPD (chronic obstructive pulmonary disease) ([place] [address] )     Decubitus ulcer of heel     LAST ASSESSED [id number]    Diabetes mellitus ([place] [address] )     History of varicose veins     Hypertension     Intermittent claudication ([facility])     Neuropathy     Seasonal allergies      Social History     Socioeconomic History    Marital status:      Spouse name: Not on file    Number of children: 1    Years of education: Not on file    Highest education level: Not on file   Occupational History    Occupation: RETIRED   Social Needs    Financial resource strain: Not on file    Food insecurity:     Worry: Not on file     Inability: Not on file    Transportation needs:     Medical: Not on file     Non-medical: Not on file   Tobacco Use    Smoking status: Never Smoker    Smokeless tobacco: Never Used   Substance and Sexual Activity    Alcohol use: No    Drug use: No    Sexual activity: Never   Lifestyle    Physical activity: Days per week: Not on file     Minutes per session: Not on file    Stress: Not on file   Relationships    Social connections:     Talks on phone: Not on file     Gets together: Not on file     Attends Cheondoism service: Not on file     Active member of club or organization: Not on file     Attends meetings of clubs or organizations: Not on file     Relationship status: Not on file    Intimate partner violence:     Fear of current or ex partner: Not on file     Emotionally abused: Not on file     Physically abused: Not on file     Forced sexual activity: Not on file   Other Topics Concern    Not on file   Social History Narrative    DENIED [address]    FEELS SAFE AT [facility] - SISTER    NO LIVING WILL    POA IN EXISTENCE     SUPPORTIVE AND SAFE    One son, 2 granddaughters      Family History   Problem Relation Age of Onset    Other Mother         CARDIAC DISORDER     Diabetes Mother     Cancer Father     Other Family         CARDIAC DISORDER     Diabetes Family     Cancer Family     Mental illness Family      Past Surgical History:   Procedure Laterality Date    ANGIOPLASTY      W/ FLUOROSC ANGIOGRAPGY PERIPHERAL ARTERY ADDITIONAL  LAST ASSESSED [id number]    ANGIOPLASTY / STENTING FEMORAL      TANSCATH INTRAVASCULAR STENT PLACEMENT PERCUTANEOUS FEMORAL     FULL THICKNESS SKIN GRAFT      TENDON REPAIR      TOE AMPUTATION Left 12/27/2018    Procedure: AMPUTATION left 4th TOE;  Surgeon: Wendy Gonzalez DPM;  [facility];  Service: Podiatry       Current Outpatient Medications:     albuterol (PROVENTIL HFA,VENTOLIN HFA) 90 mcg/act inhaler, Inhale 2 puffs every 6 (six) hours as needed for wheezing, Disp: 1 Inhaler, Rfl: 0    allopurinol (ZYLOPRIM) 300 mg tablet, TAKE 1 TABLET BY MOUTH DAILY, Disp: 90 tablet, Rfl: 1    atorvastatin (LIPITOR) 20 mg tablet, Take 1 tablet (20 mg total) by mouth daily, Disp: 90 tablet, Rfl: 0    BD INSULIN SYRINGE U/F 31G X 5/16" 0 5 ML MISC, USE AS DIRECTED WITH NOVOLIN 70/30, Disp: , Rfl: 0    BD PEN NEEDLE HILARIO U/F 32G X 4 MM MISC, *5/5*INJECT UNDER THE SKIN 3 (THREE) TIMES A DAY, Disp: , Rfl: 3    fluticasone-vilanterol (BREO ELLIPTA) 100-25 mcg/inh inhaler, Inhale 1 puff daily, Disp: 1 Inhaler, Rfl: 0    insulin lispro (HUMALOG KWIKPEN) 100 units/mL injection pen, Inject 20 Units under the skin 3 (three) times a day with meals (Patient taking differently: Inject 30 Units under the skin 3 (three) times a day with meals ), Disp: 5 pen, Rfl: 0    Insulin Pen Needle 31G X 5 MM MISC, by Does not apply route 2 (two) times a day for 50 days, Disp: 100 each, Rfl: 0    losartan (COZAAR) 50 mg tablet, Take 1 tablet (50 mg total) by mouth daily, Disp: 30 tablet, Rfl: 5    metFORMIN (GLUCOPHAGE) 1000 MG tablet, Take 1 tablet (1,000 mg total) by mouth 2 (two) times a day with meals, Disp: 120 tablet, Rfl: 0    metoprolol tartrate (LOPRESSOR) 25 mg tablet, TAKE 1 TABLET BY MOUTH EVERY 12 HOURS, Disp: 180 tablet, Rfl: 3    spironolactone (ALDACTONE) 25 mg tablet, Take 1 tablet (25 mg total) by mouth daily, Disp: 30 tablet, Rfl: 5    torsemide (DEMADEX) 20 mg tablet, TAKE 1 TABLET BY MOUTH TWICE A DAY, Disp: 60 tablet, Rfl: 2    warfarin (COUMADIN) 5 mg tablet, 1-2 tablets daily or as directed by MD , Disp: 60 tablet, Rfl: 3  Allergies   Allergen Reactions    Latex Rash       Labs:not applicable  Imaging: No results found  Review of Systems:  Review of Systems   All other systems reviewed and are negative  Physical Exam:  /70 (BP Location: Left arm, Patient Position: Sitting, Cuff Size: Large)   Pulse 60 Comment: irreg  Ht 6' 3" (1 905 m)   Wt (!) 168 kg (371 lb 6 4 oz)   BMI 46 42 kg/m²   Physical Exam   Constitutional: He is oriented to person, place, and time  He appears well-developed and well-nourished     HENT:   Head: Normocephalic and atraumatic  Eyes: Pupils are equal, round, and reactive to light  Conjunctivae are normal    Neck: Normal range of motion  Neck supple  Cardiovascular: Normal rate and normal heart sounds  Lower extremity edema   Pulmonary/Chest: Effort normal and breath sounds normal    Neurological: He is alert and oriented to person, place, and time  Skin: Skin is warm and dry  Psychiatric: He has a normal mood and affect  Vitals reviewed  Discussion/Summary:  Have encouraged the patient to be compliant with his diuretic therapy which includes per lawn a lack tone and torsemide  I have asked him to watch his salt intake  He will monitor his weight  His daughter was in attendance  I have asked him to call if there is a problem in the interim otherwise I will see him in six months time  I will check an echo prior to his next visit

## 2019-03-14 NOTE — PROGRESS NOTES
3/14/19, tc to pt, left message on answering machine, continue current dose, inr due 10 days, pt to call if any changes in health, medication, diet, or bleeding   kathya

## 2019-03-15 ENCOUNTER — OFFICE VISIT (OUTPATIENT)
Dept: CARDIOLOGY CLINIC | Facility: CLINIC | Age: 69
End: 2019-03-15
Payer: COMMERCIAL

## 2019-03-15 VITALS
HEART RATE: 60 BPM | SYSTOLIC BLOOD PRESSURE: 120 MMHG | HEIGHT: 75 IN | DIASTOLIC BLOOD PRESSURE: 70 MMHG | WEIGHT: 315 LBS | BODY MASS INDEX: 39.17 KG/M2

## 2019-03-15 DIAGNOSIS — E78.00 PURE HYPERCHOLESTEROLEMIA: ICD-10-CM

## 2019-03-15 DIAGNOSIS — I10 ESSENTIAL HYPERTENSION: ICD-10-CM

## 2019-03-15 DIAGNOSIS — I42.9 CARDIOMYOPATHY, UNSPECIFIED TYPE (HCC): ICD-10-CM

## 2019-03-15 DIAGNOSIS — I48.20 CHRONIC ATRIAL FIBRILLATION (HCC): Primary | ICD-10-CM

## 2019-03-15 DIAGNOSIS — I50.32 CHRONIC DIASTOLIC CONGESTIVE HEART FAILURE (HCC): ICD-10-CM

## 2019-03-15 PROCEDURE — 99215 OFFICE O/P EST HI 40 MIN: CPT | Performed by: INTERNAL MEDICINE

## 2019-03-18 ENCOUNTER — APPOINTMENT (EMERGENCY)
Dept: NON INVASIVE DIAGNOSTICS | Facility: HOSPITAL | Age: 69
End: 2019-03-18
Payer: COMMERCIAL

## 2019-03-18 ENCOUNTER — HOSPITAL ENCOUNTER (EMERGENCY)
Facility: HOSPITAL | Age: 69
Discharge: HOME | End: 2019-03-18
Attending: EMERGENCY MEDICINE | Admitting: EMERGENCY MEDICINE
Payer: COMMERCIAL

## 2019-03-18 ENCOUNTER — APPOINTMENT (EMERGENCY)
Dept: RADIOLOGY | Facility: HOSPITAL | Age: 69
End: 2019-03-18
Payer: COMMERCIAL

## 2019-03-18 ENCOUNTER — TELEPHONE (OUTPATIENT)
Dept: FAMILY MEDICINE CLINIC | Facility: CLINIC | Age: 69
End: 2019-03-18

## 2019-03-18 VITALS
DIASTOLIC BLOOD PRESSURE: 78 MMHG | OXYGEN SATURATION: 99 % | TEMPERATURE: 97.6 F | SYSTOLIC BLOOD PRESSURE: 136 MMHG | HEART RATE: 60 BPM | BODY MASS INDEX: 46.37 KG/M2 | WEIGHT: 315 LBS | RESPIRATION RATE: 18 BRPM

## 2019-03-18 DIAGNOSIS — M79.89 LEFT LEG SWELLING: ICD-10-CM

## 2019-03-18 DIAGNOSIS — M79.89 SWELLING OF LIMB: ICD-10-CM

## 2019-03-18 DIAGNOSIS — L03.116 CELLULITIS OF LEFT LEG: Primary | ICD-10-CM

## 2019-03-18 LAB
ALBUMIN SERPL BCP-MCNC: 3 G/DL (ref 3.5–5)
ALP SERPL-CCNC: 197 U/L (ref 46–116)
ALT SERPL W P-5'-P-CCNC: 15 U/L (ref 12–78)
ANION GAP SERPL CALCULATED.3IONS-SCNC: 5 MMOL/L (ref 4–13)
APTT PPP: 47 SECONDS (ref 26–38)
AST SERPL W P-5'-P-CCNC: 13 U/L (ref 5–45)
BASOPHILS # BLD AUTO: 0.05 THOUSANDS/ΜL (ref 0–0.1)
BASOPHILS NFR BLD AUTO: 1 % (ref 0–1)
BILIRUB SERPL-MCNC: 0.5 MG/DL (ref 0.2–1)
BUN SERPL-MCNC: 29 MG/DL (ref 5–25)
CALCIUM SERPL-MCNC: 9.2 MG/DL (ref 8.3–10.1)
CHLORIDE SERPL-SCNC: 106 MMOL/L (ref 100–108)
CO2 SERPL-SCNC: 30 MMOL/L (ref 21–32)
CREAT SERPL-MCNC: 1.31 MG/DL (ref 0.6–1.3)
EOSINOPHIL # BLD AUTO: 0.22 THOUSAND/ΜL (ref 0–0.61)
EOSINOPHIL NFR BLD AUTO: 4 % (ref 0–6)
ERYTHROCYTE [DISTWIDTH] IN BLOOD BY AUTOMATED COUNT: 14.7 % (ref 11.6–15.1)
GFR SERPL CREATININE-BSD FRML MDRD: 56 ML/MIN/1.73SQ M
GLUCOSE SERPL-MCNC: 109 MG/DL (ref 65–140)
HCT VFR BLD AUTO: 37.5 % (ref 36.5–49.3)
HGB BLD-MCNC: 11.4 G/DL (ref 12–17)
IMM GRANULOCYTES # BLD AUTO: 0.05 THOUSAND/UL (ref 0–0.2)
IMM GRANULOCYTES NFR BLD AUTO: 1 % (ref 0–2)
INR PPP: 2.25 (ref 0.86–1.17)
LYMPHOCYTES # BLD AUTO: 1.2 THOUSANDS/ΜL (ref 0.6–4.47)
LYMPHOCYTES NFR BLD AUTO: 21 % (ref 14–44)
MCH RBC QN AUTO: 28.6 PG (ref 26.8–34.3)
MCHC RBC AUTO-ENTMCNC: 30.4 G/DL (ref 31.4–37.4)
MCV RBC AUTO: 94 FL (ref 82–98)
MONOCYTES # BLD AUTO: 0.56 THOUSAND/ΜL (ref 0.17–1.22)
MONOCYTES NFR BLD AUTO: 10 % (ref 4–12)
NEUTROPHILS # BLD AUTO: 3.62 THOUSANDS/ΜL (ref 1.85–7.62)
NEUTS SEG NFR BLD AUTO: 63 % (ref 43–75)
NRBC BLD AUTO-RTO: 0 /100 WBCS
PLATELET # BLD AUTO: 245 THOUSANDS/UL (ref 149–390)
PMV BLD AUTO: 9.8 FL (ref 8.9–12.7)
POTASSIUM SERPL-SCNC: 4.5 MMOL/L (ref 3.5–5.3)
PROT SERPL-MCNC: 7.7 G/DL (ref 6.4–8.2)
PROTHROMBIN TIME: 23.6 SECONDS (ref 11.8–14.2)
RBC # BLD AUTO: 3.98 MILLION/UL (ref 3.88–5.62)
SODIUM SERPL-SCNC: 141 MMOL/L (ref 136–145)
WBC # BLD AUTO: 5.7 THOUSAND/UL (ref 4.31–10.16)

## 2019-03-18 PROCEDURE — 93971 EXTREMITY STUDY: CPT

## 2019-03-18 PROCEDURE — 73630 X-RAY EXAM OF FOOT: CPT

## 2019-03-18 PROCEDURE — 36415 COLL VENOUS BLD VENIPUNCTURE: CPT | Performed by: PHYSICIAN ASSISTANT

## 2019-03-18 PROCEDURE — 85730 THROMBOPLASTIN TIME PARTIAL: CPT | Performed by: PHYSICIAN ASSISTANT

## 2019-03-18 PROCEDURE — 93971 EXTREMITY STUDY: CPT | Performed by: INTERNAL MEDICINE

## 2019-03-18 PROCEDURE — 85610 PROTHROMBIN TIME: CPT | Performed by: PHYSICIAN ASSISTANT

## 2019-03-18 PROCEDURE — 85025 COMPLETE CBC W/AUTO DIFF WBC: CPT | Performed by: PHYSICIAN ASSISTANT

## 2019-03-18 PROCEDURE — 99284 EMERGENCY DEPT VISIT MOD MDM: CPT

## 2019-03-18 PROCEDURE — 80053 COMPREHEN METABOLIC PANEL: CPT | Performed by: PHYSICIAN ASSISTANT

## 2019-03-18 RX ORDER — CEPHALEXIN 250 MG/1
500 CAPSULE ORAL ONCE
Status: COMPLETED | OUTPATIENT
Start: 2019-03-18 | End: 2019-03-18

## 2019-03-18 RX ORDER — CEPHALEXIN 500 MG/1
500 CAPSULE ORAL 4 TIMES DAILY
Qty: 28 CAPSULE | Refills: 0 | Status: SHIPPED | OUTPATIENT
Start: 2019-03-18 | End: 2019-03-25 | Stop reason: SDUPTHER

## 2019-03-18 RX ADMIN — CEPHALEXIN 500 MG: 250 CAPSULE ORAL at 16:42

## 2019-03-18 NOTE — TELEPHONE ENCOUNTER
Francoise Friedman, his caretaker, called stating his leg is swollen, a nurse from [facility] was out to his home yesterday morning and advised him that he should have this evaluated by his PCP  Your schedule for today is completely full, asking for any advice or recommendations    Thanks

## 2019-03-18 NOTE — TELEPHONE ENCOUNTER
Jade Huang from [facility] called requesting a call back   Jade Huang state pt needs an intervention if not he will need up in the hospital  Jade Huang can be reached at [phone]

## 2019-03-18 NOTE — ED PROVIDER NOTES
History  Chief Complaint   Patient presents with    Foot Swelling     left foot swelling  pt here at ED at recommendation of visiting home RN  pt with toe amputation on left foot in December 2018     Patient is a 78 y/o M with h/o DM, CHF, COPD, HTN that presents to the ED with left foot swelling for 2 months  He states he dropped an insulin needle on the floor a couple months ago and might have stepped on it  He denies fevers/chills/nausea/vomiting  He states he has h/o DVT and has been taking coumadin  He has chronic SOB, no chest pain  Patient had amputation of left 4th toe in December  He had a visiting nurse come to his house yesterday and she suggested he come to the ER for further evaluation of leg swelling  Patient does not have sensation in his feet, which is chronic  Patient states his blood sugars have been high  ACcording to previous records patient is noncompliant with meds  History provided by:  Patient and relative  Leg Pain   Location:  Leg  Time since incident:  2 months  Injury: no    Leg location:  L lower leg  Pain details:     Severity:  No pain  Chronicity:  Chronic  Prior injury to area:  No  Relieved by:  Nothing  Worsened by:  Nothing  Ineffective treatments:  None tried  Associated symptoms: swelling    Associated symptoms: no decreased ROM and no fever        Prior to Admission Medications   Prescriptions Last Dose Informant Patient Reported? Taking?    BD INSULIN SYRINGE U/F 31G X 5/16" 0 5 ML MISC  Self Yes No   Sig: USE AS DIRECTED WITH NOVOLIN 70/30   BD PEN NEEDLE HILARIO U/F 32G X 4 MM MISC  Self Yes No   Sig: *5/5*INJECT UNDER THE SKIN 3 (THREE) TIMES A DAY   Insulin Pen Needle 31G X 5 MM MISC  Self No No   Sig: by Does not apply route 2 (two) times a day for 50 days   albuterol (PROVENTIL HFA,VENTOLIN HFA) 90 mcg/act inhaler  Self No No   Sig: Inhale 2 puffs every 6 (six) hours as needed for wheezing   allopurinol (ZYLOPRIM) 300 mg tablet   No No   Sig: TAKE 1 TABLET BY MOUTH DAILY   atorvastatin (LIPITOR) 20 mg tablet   No No   Sig: Take 1 tablet (20 mg total) by mouth daily   fluticasone-vilanterol (BREO ELLIPTA) 100-25 mcg/inh inhaler  Self No No   Sig: Inhale 1 puff daily   insulin lispro (HUMALOG KWIKPEN) 100 units/mL injection pen  Self No No   Sig: Inject 20 Units under the skin 3 (three) times a day with meals   Patient taking differently: Inject 30 Units under the skin 3 (three) times a day with meals    losartan (COZAAR) 50 mg tablet  Self No No   Sig: Take 1 tablet (50 mg total) by mouth daily   metFORMIN (GLUCOPHAGE) 1000 MG tablet  Self No No   Sig: Take 1 tablet (1,000 mg total) by mouth 2 (two) times a day with meals   metoprolol tartrate (LOPRESSOR) 25 mg tablet  Self No No   Sig: TAKE 1 TABLET BY MOUTH EVERY 12 HOURS   spironolactone (ALDACTONE) 25 mg tablet   No No   Sig: Take 1 tablet (25 mg total) by mouth daily   torsemide (DEMADEX) 20 mg tablet  Self No No   Sig: TAKE 1 TABLET BY MOUTH TWICE A DAY   warfarin (COUMADIN) 5 mg tablet   No No   Si-2 tablets daily or as directed by MD       Facility-Administered Medications: None       Past Medical History:   Diagnosis Date    [facility]     COPD [facility]ch[facility] obstructive pulmonary disease) [facility]     Decubitus ulcer of heel     LAST ASSESSED [id number]    Diabetes mellitus ([facility] )     History of varicose veins     Hypertension     Intermittent claudication ([facility])     Neuropathy     Seasonal allergies        Past Surgical History:   Procedure Laterality Date    ANGIOPLASTY      W/ FLUOROSC ANGIOGRAPGY PERIPHERAL ARTERY ADDITIONAL  LAST ASSESSED [id number]    ANGIOPLASTY / STENTING FEMORAL      TANSCATH INTRAVASCULAR STENT PLACEMENT PERCUTANEOUS FEMORAL     FULL THICKNESS SKIN GRAFT      TENDON REPAIR      TOE AMPUTATION Left 2018    Procedure: AMPUTATION left 4th TOE;  Surgeon: Alexy Beasley DPM;  Location:  MAIN OR;  Service: Podiatry       Family History   Problem Relation Age of Onset    Other Mother         CARDIAC DISORDER     Diabetes Mother     Cancer Father     Other Family         CARDIAC DISORDER     Diabetes Family     Cancer Family     Mental illness Family      I have reviewed and agree with the history as documented  Social History     Tobacco Use    Smoking status: Never Smoker    Smokeless tobacco: Never Used   Substance Use Topics    Alcohol use: No    Drug use: No        Review of Systems   Constitutional: Negative for chills and fever  HENT: Negative  Respiratory: Positive for shortness of breath (chronic)  Cardiovascular: Positive for leg swelling  Negative for chest pain  Gastrointestinal: Negative for abdominal pain  Skin: Negative for color change  Neurological: Negative for dizziness, weakness and numbness  Psychiatric/Behavioral: Negative for confusion  All other systems reviewed and are negative  Physical Exam  Physical Exam   Constitutional: He appears well-developed and well-nourished  He is cooperative  He does not appear ill  No distress  Patient morbidly obese with BMI 46 3   HENT:   Head: Normocephalic and atraumatic  Mouth/Throat: Oropharynx is clear and moist    Eyes: Conjunctivae are normal    Neck: Normal range of motion  Cardiovascular: Normal rate, regular rhythm and normal heart sounds  No murmur heard  Pulmonary/Chest: Effort normal  He has decreased breath sounds (due to body habitus)  Abdominal: There is no tenderness  Obese abdomen   Musculoskeletal:   Left foot 2+ pitting edema, erythema and warmth to left foot and left leg  Left 4th toe amputated, wound healed well  Right leg with 1+ pitting edema  Neurological: He is alert  He has normal strength  He is not disoriented  A sensory deficit (b/l feet have decreased sensation) is present  Skin: Skin is warm and dry  No bruising noted  There is erythema (left foot and left leg  )  No pallor     Nursing note and vitals reviewed        Vital Signs  ED Triage Vitals [03/18/19 1337]   Temperature Pulse Respirations Blood Pressure SpO2   97 6 °F (36 4 °C) 72 18 131/60 100 %      Temp Source Heart Rate Source Patient Position - Orthostatic VS BP Location FiO2 (%)   Tympanic Monitor Sitting Right arm --      Pain Score       No Pain           Vitals:    03/18/19 1337 03/18/19 1400 03/18/19 1430 03/18/19 1602   BP: 131/60 134/64 120/56 136/78   Pulse: 72 64 60 60   Patient Position - Orthostatic VS: Sitting Sitting Sitting Sitting       qSOFA     Row Name 03/18/19 1602 03/18/19 1430 03/18/19 1400 03/18/19 1337       Altered mental status GCS < 15  --  --  --  --     Respiratory Rate > / =22  0  --  0  0     Systolic BP < / =627  0  0  0  0     Q Sofa Score  0  0  0  0           Visual Acuity      ED Medications  Medications   cephalexin (KEFLEX) capsule 500 mg (500 mg Oral Given 3/18/19 1642)       Diagnostic Studies  Results Reviewed     Procedure Component Value Units Date/Time    APTT [[id number]]  (Abnormal) Collected:  03/18/19 1526    Lab Status:  Final result Specimen:  Blood from Arm, Right Updated:  03/18/19 1557     PTT 47 seconds     Protime-INR [[id number]]  (Abnormal) Collected:  03/18/19 1526    Lab Status:  Final result Specimen:  Blood from Arm, Right Updated:  03/18/19 1556     Protime 23 6 seconds      INR 2 25    Comprehensive metabolic panel [[id number]]  (Abnormal) Collected:  03/18/19 1526    Lab Status:  Final result Specimen:  Blood from Arm, Right Updated:  03/18/19 1555     Sodium 141 mmol/L      Potassium 4 5 mmol/L      Chloride 106 mmol/L      CO2 30 mmol/L      ANION GAP 5 mmol/L      BUN 29 mg/dL      Creatinine 1 31 mg/dL      Glucose 109 mg/dL      Calcium 9 2 mg/dL      AST 13 U/L      ALT 15 U/L      Alkaline Phosphatase 197 U/L      Total Protein 7 7 g/dL      Albumin 3 0 g/dL      Total Bilirubin 0 50 mg/dL      eGFR 56 ml/min/1 73sq m     Narrative:       National Kidney Disease Education Program recommendations are as follows:  GFR calculation is accurate only with a steady state creatinine  Chronic Kidney disease less than 60 ml/min/1 73 sq  meters  Kidney failure less than 15 ml/min/1 73 sq  meters  CBC and differential [[id number]]  (Abnormal) Collected:  03/18/19 1526    Lab Status:  Final result Specimen:  Blood from Arm, Right Updated:  03/18/19 1539     WBC 5 70 Thousand/uL      RBC 3 98 Million/uL      Hemoglobin 11 4 g/dL      Hematocrit 37 5 %      MCV 94 fL      MCH 28 6 pg      MCHC 30 4 g/dL      RDW 14 7 %      MPV 9 8 fL      Platelets 303 Thousands/uL      nRBC 0 /100 WBCs      Neutrophils Relative 63 %      Immat GRANS % 1 %      Lymphocytes Relative 21 %      Monocytes Relative 10 %      Eosinophils Relative 4 %      Basophils Relative 1 %      Neutrophils Absolute 3 62 Thousands/µL      Immature Grans Absolute 0 05 Thousand/uL      Lymphocytes Absolute 1 20 Thousands/µL      Monocytes Absolute 0 56 Thousand/µL      Eosinophils Absolute 0 22 Thousand/µL      Basophils Absolute 0 05 Thousands/µL                  VAS lower limb venous duplex study, unilateral/limited   Final Result by Kassie Mathews MD (03/18 1654)      XR foot 3+ views LEFT   Final Result by Hari Wray MD (03/18 1633)      1  Moderate diffuse soft tissue swelling at the dorsum of the foot  No radiopaque foreign body  2   Degenerative changes as described, unchanged from prior exams  3   Status post amputation of the 4th digit  Workstation performed: [id number]                    Procedures  Procedures       Phone Contacts  ED Phone Contact    ED Course  ED Course as of Mar 18 2057   Mon Mar 18, 2019   1517 Doppler, no evidence of acute DVT  He has evidence of 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 from the proximal to distal thigh in left leg                                     MDM  Number of Diagnoses or Management Options  Cellulitis of left leg: new and requires workup  Left leg swelling: established and worsening  Diagnosis management comments: Patient with swelling to left foot and leg for 2 months  Will order labs to r/o infection, doppler to r/o DVT  Xray ordered to r/o FB since patient stated he might have stepped on his insulin needle  WBC normal, doppler negative for acute occlusive thrombus, will treat with antibiotic for cellulitis  No signs of sepsis  No signs of osteo on xray  Amount and/or Complexity of Data Reviewed  Clinical lab tests: ordered and reviewed  Tests in the radiology section of CPT®: ordered and reviewed    Patient Progress  Patient progress: stable      Disposition  Final diagnoses:   Cellulitis of left leg   Left leg swelling     Time reflects when diagnosis was documented in both MDM as applicable and the Disposition within this note     Time User Action Codes Description Comment    3/18/2019  3:25 PM Odessa Lightning Add [M79 89] Swelling of limb     3/18/2019  4:34 PM Subha Nicholasy Add [L03 116] Cellulitis of left leg     3/18/2019  4:34 PM Subha Nicholasy Add [M79 89] Left leg swelling       ED Disposition     ED Disposition Condition Date/Time Comment    Discharge Stable Mon Mar 18, 2019  4:33 PM [address] discharge to home/self care              Follow-up Information     Follow up With Specialties Details Why Contact Info    Jason Sewell MD Family Medicine Call in 2 days For recheck [place] [phone]            Discharge Medication List as of 3/18/2019  4:35 PM      START taking these medications    Details   cephalexin (KEFLEX) 500 mg capsule Take 1 capsule (500 mg total) by mouth 4 (four) times a day for 7 days, Starting Mon 3/18/2019, Until Mon 3/25/2019, Normal         CONTINUE these medications which have NOT CHANGED    Details   albuterol (PROVENTIL HFA,VENTOLIN HFA) 90 mcg/act inhaler Inhale 2 puffs every 6 (six) hours as needed for wheezing, Starting Mon 9/17/2018, Normal      allopurinol (ZYLOPRIM) 300 mg tablet TAKE 1 TABLET BY MOUTH DAILY, Normal      atorvastatin (LIPITOR) 20 mg tablet Take 1 tablet (20 mg total) by mouth daily, Starting Thu 1/10/2019, Normal      BD INSULIN SYRINGE U/F 31G X 5/16" 0 5 ML MISC USE AS DIRECTED WITH NOVOLIN 70/30, Historical Med      BD PEN NEEDLE HILARIO U/F 32G X 4 MM MISC *5/5*INJECT UNDER THE SKIN 3 (THREE) TIMES A DAY, Historical Med      fluticasone-vilanterol (BREO ELLIPTA) 100-25 mcg/inh inhaler Inhale 1 puff daily, Starting Mon 9/17/2018, Normal      insulin lispro (HUMALOG KWIKPEN) 100 units/mL injection pen Inject 20 Units under the skin 3 (three) times a day with meals, Starting Fri 12/28/2018, Print      losartan (COZAAR) 50 mg tablet Take 1 tablet (50 mg total) by mouth daily, Starting Mon 9/17/2018, Normal      metFORMIN (GLUCOPHAGE) 1000 MG tablet Take 1 tablet (1,000 mg total) by mouth 2 (two) times a day with meals, Starting Mon 9/17/2018, Normal      metoprolol tartrate (LOPRESSOR) 25 mg tablet TAKE 1 TABLET BY MOUTH EVERY 12 HOURS, Normal      spironolactone (ALDACTONE) 25 mg tablet Take 1 tablet (25 mg total) by mouth daily, Starting Thu 1/31/2019, Normal      torsemide (DEMADEX) 20 mg tablet TAKE 1 TABLET BY MOUTH TWICE A DAY, Normal      warfarin (COUMADIN) 5 mg tablet 1-2 tablets daily or as directed by MD , Normal           No discharge procedures on file      ED Provider  Electronically Signed by           Claudia Castro PA-C  03/18/19 2052

## 2019-03-18 NOTE — ED NOTES
Vascular called and are aware of ordered vascular study for this pt   Per vascular they will be bedside shortly to conduct ordered study     You Maza, RN  03/18/19 5644

## 2019-03-18 NOTE — DISCHARGE INSTRUCTIONS
Rest, elevate legs  Take antibiotic as directed  Take your diuretic as directed  Follow up with family doctor in 2-3 days for recheck  Return to ER if symptoms worsen or fevers

## 2019-03-19 ENCOUNTER — ANTICOAG VISIT (OUTPATIENT)
Dept: CARDIOLOGY CLINIC | Facility: CLINIC | Age: 69
End: 2019-03-19

## 2019-03-19 DIAGNOSIS — I48.20 CHRONIC ATRIAL FIBRILLATION (HCC): ICD-10-CM

## 2019-03-19 NOTE — PROGRESS NOTES
3/19/19, tc from pt, reports he was seen in er 3/18 for cellulitis, started on keflex x 7 days  Will continue current dose of warfarin, inr due 3 days   kathya

## 2019-03-20 ENCOUNTER — PATIENT OUTREACH (OUTPATIENT)
Dept: FAMILY MEDICINE CLINIC | Facility: CLINIC | Age: 69
End: 2019-03-20

## 2019-03-20 DIAGNOSIS — Z71.89 COMPLEX CARE COORDINATION: Primary | ICD-10-CM

## 2019-03-20 NOTE — PROGRESS NOTES
This CM spoke to pt who declined OPCM services but accepted CM contact number and was informed he could call with any concerns

## 2019-03-21 ENCOUNTER — APPOINTMENT (OUTPATIENT)
Dept: LAB | Facility: HOSPITAL | Age: 69
End: 2019-03-21
Attending: INTERNAL MEDICINE
Payer: COMMERCIAL

## 2019-03-21 ENCOUNTER — ANTICOAG VISIT (OUTPATIENT)
Dept: CARDIOLOGY CLINIC | Facility: CLINIC | Age: 69
End: 2019-03-21

## 2019-03-21 DIAGNOSIS — I48.20 CHRONIC ATRIAL FIBRILLATION (HCC): ICD-10-CM

## 2019-03-25 ENCOUNTER — OFFICE VISIT (OUTPATIENT)
Dept: FAMILY MEDICINE CLINIC | Facility: CLINIC | Age: 69
End: 2019-03-25
Payer: COMMERCIAL

## 2019-03-25 VITALS
WEIGHT: 315 LBS | HEIGHT: 75 IN | HEART RATE: 60 BPM | DIASTOLIC BLOOD PRESSURE: 60 MMHG | SYSTOLIC BLOOD PRESSURE: 110 MMHG | BODY MASS INDEX: 39.17 KG/M2 | RESPIRATION RATE: 18 BRPM | TEMPERATURE: 97.5 F

## 2019-03-25 DIAGNOSIS — IMO0001 CHRONIC VENOUS HYPERTENSION W ULCERATION, BILATERAL: ICD-10-CM

## 2019-03-25 DIAGNOSIS — L03.116 CELLULITIS OF LEFT LEG: ICD-10-CM

## 2019-03-25 DIAGNOSIS — I89.0 LYMPHEDEMA: Primary | ICD-10-CM

## 2019-03-25 PROCEDURE — 99214 OFFICE O/P EST MOD 30 MIN: CPT | Performed by: FAMILY MEDICINE

## 2019-03-25 PROCEDURE — 3008F BODY MASS INDEX DOCD: CPT | Performed by: FAMILY MEDICINE

## 2019-03-25 PROCEDURE — 1160F RVW MEDS BY RX/DR IN RCRD: CPT | Performed by: FAMILY MEDICINE

## 2019-03-25 PROCEDURE — 1036F TOBACCO NON-USER: CPT | Performed by: FAMILY MEDICINE

## 2019-03-25 RX ORDER — CEPHALEXIN 500 MG/1
500 CAPSULE ORAL 4 TIMES DAILY
Qty: 28 CAPSULE | Refills: 0 | Status: SHIPPED | OUTPATIENT
Start: 2019-03-25 | End: 2019-04-01

## 2019-03-25 NOTE — PROGRESS NOTES
Assessment/Plan:    Chronic venous hypertension w ulceration, bilateral (HCC)  Having a lot of trouble walking due to lymphedema       Diagnoses and all orders for this visit:    Lymphedema  -     Ambulatory referral to Physical Therapy; Future    Cellulitis of left leg  -     cephalexin (KEFLEX) 500 mg capsule; Take 1 capsule (500 mg total) by mouth 4 (four) times a day for 7 days    Chronic venous hypertension w ulceration, bilateral ([address] )  -     Ambulatory referral to Physical Therapy; Future          Subjective:      Patient ID: Noel Chun is a 79 y o  male  Went to ER with shortness of breath -having echo April 9th, stress test also recommended, on 2 water pills/day, on antibiotics  For cellulitis,so improving      The following portions of the patient's history were reviewed and updated as appropriate: allergies, current medications, past family history, past medical history, past social history, past surgical history and problem list   *Review of Systems   Constitutional: Negative for activity change, appetite change and unexpected weight change  Respiratory: Positive for shortness of breath  Negative for chest tightness  Cardiovascular: Positive for leg swelling  Negative for chest pain  Skin: Positive for wound  Neurological: Negative for headaches  Objective:      /60 (BP Location: Left arm, Patient Position: Sitting, Cuff Size: Standard)   Pulse 60 Comment: irregular  Temp 97 5 °F (36 4 °C) (Temporal)   Resp 18   Ht 6' 3" (1 905 m)   Wt (!) 167 kg (368 lb 9 6 oz)   BMI 46 07 kg/m²          Physical Exam   Constitutional: He appears well-developed and well-nourished  Neck: No thyromegaly present  Cardiovascular: Normal rate, regular rhythm and normal heart sounds  Pulmonary/Chest: Effort normal and breath sounds normal    Musculoskeletal: He exhibits edema  Lymphadenopathy:     He has no cervical adenopathy  Skin: There is erythema     Mild erythema of legs   Vitals reviewed

## 2019-03-28 ENCOUNTER — APPOINTMENT (OUTPATIENT)
Dept: LAB | Facility: HOSPITAL | Age: 69
End: 2019-03-28
Attending: INTERNAL MEDICINE
Payer: COMMERCIAL

## 2019-03-28 ENCOUNTER — ANTICOAG VISIT (OUTPATIENT)
Dept: CARDIOLOGY CLINIC | Facility: CLINIC | Age: 69
End: 2019-03-28

## 2019-03-28 DIAGNOSIS — I48.20 CHRONIC ATRIAL FIBRILLATION (HCC): ICD-10-CM

## 2019-03-28 NOTE — PROGRESS NOTES
3/28/19, tc to pt, left message on answering machine, continue current dose, inr due 2 weeks  Pt to call if any changes in health, medication or bleeding   kathya

## 2019-04-04 ENCOUNTER — HOSPITAL ENCOUNTER (OUTPATIENT)
Dept: NON INVASIVE DIAGNOSTICS | Facility: HOSPITAL | Age: 69
Discharge: HOME | End: 2019-04-04
Attending: INTERNAL MEDICINE
Payer: COMMERCIAL

## 2019-04-04 DIAGNOSIS — E78.00 PURE HYPERCHOLESTEROLEMIA: ICD-10-CM

## 2019-04-04 DIAGNOSIS — I10 ESSENTIAL HYPERTENSION: ICD-10-CM

## 2019-04-04 DIAGNOSIS — I42.9 CARDIOMYOPATHY, UNSPECIFIED TYPE (HCC): ICD-10-CM

## 2019-04-04 DIAGNOSIS — I50.32 CHRONIC DIASTOLIC CONGESTIVE HEART FAILURE (HCC): ICD-10-CM

## 2019-04-04 DIAGNOSIS — I48.20 CHRONIC ATRIAL FIBRILLATION (HCC): ICD-10-CM

## 2019-04-04 PROCEDURE — 93306 TTE W/DOPPLER COMPLETE: CPT | Performed by: INTERNAL MEDICINE

## 2019-04-04 PROCEDURE — C8929 TTE W OR WO FOL WCON,DOPPLER: HCPCS

## 2019-04-04 RX ADMIN — PERFLUTREN 0.8 ML/MIN: 6.52 INJECTION, SUSPENSION INTRAVENOUS at 13:01

## 2019-04-12 ENCOUNTER — ANTICOAG VISIT (OUTPATIENT)
Dept: CARDIOLOGY CLINIC | Facility: CLINIC | Age: 69
End: 2019-04-12

## 2019-04-12 DIAGNOSIS — I48.20 CHRONIC ATRIAL FIBRILLATION (HCC): ICD-10-CM

## 2019-04-16 ENCOUNTER — APPOINTMENT (OUTPATIENT)
Dept: LAB | Facility: HOSPITAL | Age: 69
End: 2019-04-16
Attending: INTERNAL MEDICINE
Payer: COMMERCIAL

## 2019-04-16 ENCOUNTER — ANTICOAG VISIT (OUTPATIENT)
Dept: CARDIOLOGY CLINIC | Facility: CLINIC | Age: 69
End: 2019-04-16

## 2019-04-16 DIAGNOSIS — I48.20 CHRONIC ATRIAL FIBRILLATION (HCC): ICD-10-CM

## 2019-04-18 ENCOUNTER — TELEPHONE (OUTPATIENT)
Dept: FAMILY MEDICINE CLINIC | Facility: CLINIC | Age: 69
End: 2019-04-18

## 2019-04-22 ENCOUNTER — TELEPHONE (OUTPATIENT)
Dept: CARDIOLOGY CLINIC | Facility: CLINIC | Age: 69
End: 2019-04-22

## 2019-04-25 ENCOUNTER — TELEPHONE (OUTPATIENT)
Dept: FAMILY MEDICINE CLINIC | Facility: CLINIC | Age: 69
End: 2019-04-25

## 2019-04-26 ENCOUNTER — OFFICE VISIT (OUTPATIENT)
Dept: FAMILY MEDICINE CLINIC | Facility: CLINIC | Age: 69
End: 2019-04-26
Payer: COMMERCIAL

## 2019-04-26 VITALS
TEMPERATURE: 97.5 F | HEART RATE: 64 BPM | BODY MASS INDEX: 39.17 KG/M2 | DIASTOLIC BLOOD PRESSURE: 80 MMHG | RESPIRATION RATE: 18 BRPM | WEIGHT: 315 LBS | HEIGHT: 75 IN | SYSTOLIC BLOOD PRESSURE: 142 MMHG | OXYGEN SATURATION: 95 %

## 2019-04-26 DIAGNOSIS — R60.0 LOCALIZED EDEMA: Primary | ICD-10-CM

## 2019-04-26 DIAGNOSIS — IMO0002 DIABETES MELLITUS TYPE 2, UNCONTROLLED: ICD-10-CM

## 2019-04-26 DIAGNOSIS — R06.00 DYSPNEA, UNSPECIFIED TYPE: Primary | ICD-10-CM

## 2019-04-26 DIAGNOSIS — J45.40 MODERATE PERSISTENT ASTHMA WITHOUT COMPLICATION: ICD-10-CM

## 2019-04-26 PROCEDURE — 99214 OFFICE O/P EST MOD 30 MIN: CPT | Performed by: FAMILY MEDICINE

## 2019-04-26 RX ORDER — FLUTICASONE FUROATE AND VILANTEROL 100; 25 UG/1; UG/1
1 POWDER RESPIRATORY (INHALATION) DAILY
Qty: 1 INHALER | Refills: 0 | Status: SHIPPED | OUTPATIENT
Start: 2019-04-26 | End: 2019-07-24 | Stop reason: CLARIF

## 2019-04-26 RX ORDER — ALBUTEROL SULFATE 90 UG/1
2 AEROSOL, METERED RESPIRATORY (INHALATION) EVERY 6 HOURS PRN
Qty: 1 INHALER | Refills: 0 | Status: ON HOLD | OUTPATIENT
Start: 2019-04-26

## 2019-04-29 ENCOUNTER — TELEPHONE (OUTPATIENT)
Dept: FAMILY MEDICINE CLINIC | Facility: CLINIC | Age: 69
End: 2019-04-29

## 2019-04-29 DIAGNOSIS — IMO0002 DIABETES MELLITUS TYPE 2, UNCONTROLLED: ICD-10-CM

## 2019-04-30 DIAGNOSIS — IMO0002 DIABETES MELLITUS TYPE 2, UNCONTROLLED: ICD-10-CM

## 2019-05-02 ENCOUNTER — APPOINTMENT (OUTPATIENT)
Dept: RADIOLOGY | Facility: MEDICAL CENTER | Age: 69
End: 2019-05-02
Payer: COMMERCIAL

## 2019-05-02 DIAGNOSIS — J45.40 MODERATE PERSISTENT ASTHMA WITHOUT COMPLICATION: ICD-10-CM

## 2019-05-02 DIAGNOSIS — R06.00 DYSPNEA, UNSPECIFIED TYPE: ICD-10-CM

## 2019-05-02 PROCEDURE — 71046 X-RAY EXAM CHEST 2 VIEWS: CPT

## 2019-05-03 ENCOUNTER — TELEPHONE (OUTPATIENT)
Dept: FAMILY MEDICINE CLINIC | Facility: CLINIC | Age: 69
End: 2019-05-03

## 2019-05-06 DIAGNOSIS — E78.5 HYPERLIPIDEMIA LDL GOAL <70: ICD-10-CM

## 2019-05-07 ENCOUNTER — OFFICE VISIT (OUTPATIENT)
Dept: PULMONOLOGY | Facility: CLINIC | Age: 69
End: 2019-05-07
Payer: COMMERCIAL

## 2019-05-07 VITALS
HEIGHT: 75 IN | OXYGEN SATURATION: 94 % | WEIGHT: 315 LBS | BODY MASS INDEX: 39.17 KG/M2 | TEMPERATURE: 97.9 F | RESPIRATION RATE: 16 BRPM | SYSTOLIC BLOOD PRESSURE: 130 MMHG | DIASTOLIC BLOOD PRESSURE: 70 MMHG | HEART RATE: 70 BPM

## 2019-05-07 DIAGNOSIS — E66.01 MORBID OBESITY (HCC): ICD-10-CM

## 2019-05-07 DIAGNOSIS — J45.40 MODERATE PERSISTENT ASTHMA WITHOUT COMPLICATION: ICD-10-CM

## 2019-05-07 DIAGNOSIS — G47.33 OSA (OBSTRUCTIVE SLEEP APNEA): ICD-10-CM

## 2019-05-07 DIAGNOSIS — R06.02 SOB (SHORTNESS OF BREATH): Primary | ICD-10-CM

## 2019-05-07 PROCEDURE — 99204 OFFICE O/P NEW MOD 45 MIN: CPT | Performed by: INTERNAL MEDICINE

## 2019-05-07 RX ORDER — ATORVASTATIN CALCIUM 20 MG/1
TABLET, FILM COATED ORAL
Qty: 90 TABLET | Refills: 0 | Status: SHIPPED | OUTPATIENT
Start: 2019-05-07 | End: 2020-05-08

## 2019-05-11 DIAGNOSIS — I48.20 CHRONIC ATRIAL FIBRILLATION (HCC): ICD-10-CM

## 2019-05-12 RX ORDER — WARFARIN SODIUM 5 MG/1
TABLET ORAL
Qty: 60 TABLET | Refills: 3 | Status: SHIPPED | OUTPATIENT
Start: 2019-05-12 | End: 2019-08-12 | Stop reason: HOSPADM

## 2019-05-15 ENCOUNTER — TELEPHONE (OUTPATIENT)
Dept: SLEEP CENTER | Facility: CLINIC | Age: 69
End: 2019-05-15

## 2019-05-15 ENCOUNTER — APPOINTMENT (OUTPATIENT)
Dept: LAB | Facility: HOSPITAL | Age: 69
End: 2019-05-15
Attending: INTERNAL MEDICINE
Payer: COMMERCIAL

## 2019-05-15 ENCOUNTER — ANTICOAG VISIT (OUTPATIENT)
Dept: CARDIOLOGY CLINIC | Facility: CLINIC | Age: 69
End: 2019-05-15

## 2019-05-15 DIAGNOSIS — I48.20 CHRONIC ATRIAL FIBRILLATION (HCC): ICD-10-CM

## 2019-05-22 DIAGNOSIS — G47.33 OSA (OBSTRUCTIVE SLEEP APNEA): Primary | ICD-10-CM

## 2019-05-23 DIAGNOSIS — G47.33 OSA (OBSTRUCTIVE SLEEP APNEA): Primary | ICD-10-CM

## 2019-05-23 DIAGNOSIS — J45.40 MODERATE PERSISTENT ASTHMA WITHOUT COMPLICATION: ICD-10-CM

## 2019-05-24 ENCOUNTER — TRANSCRIBE ORDERS (OUTPATIENT)
Dept: SLEEP CENTER | Facility: CLINIC | Age: 69
End: 2019-05-24

## 2019-05-29 ENCOUNTER — APPOINTMENT (OUTPATIENT)
Dept: LAB | Facility: HOSPITAL | Age: 69
End: 2019-05-29
Attending: INTERNAL MEDICINE
Payer: COMMERCIAL

## 2019-05-29 ENCOUNTER — ANTICOAG VISIT (OUTPATIENT)
Dept: CARDIOLOGY CLINIC | Facility: CLINIC | Age: 69
End: 2019-05-29

## 2019-05-29 DIAGNOSIS — I48.20 CHRONIC ATRIAL FIBRILLATION (HCC): ICD-10-CM

## 2019-05-30 ENCOUNTER — TELEPHONE (OUTPATIENT)
Dept: FAMILY MEDICINE CLINIC | Facility: CLINIC | Age: 69
End: 2019-05-30

## 2019-05-30 DIAGNOSIS — E11.65 UNCONTROLLED TYPE 2 DIABETES MELLITUS WITH HYPERGLYCEMIA (HCC): Primary | ICD-10-CM

## 2019-06-03 ENCOUNTER — HOSPITAL ENCOUNTER (OUTPATIENT)
Dept: SLEEP CENTER | Facility: HOSPITAL | Age: 69
Discharge: HOME | End: 2019-06-03
Attending: INTERNAL MEDICINE
Payer: COMMERCIAL

## 2019-06-03 DIAGNOSIS — G47.33 OSA (OBSTRUCTIVE SLEEP APNEA): ICD-10-CM

## 2019-06-03 PROCEDURE — 95811 POLYSOM 6/>YRS CPAP 4/> PARM: CPT | Performed by: INTERNAL MEDICINE

## 2019-06-03 PROCEDURE — 95811 POLYSOM 6/>YRS CPAP 4/> PARM: CPT

## 2019-06-04 DIAGNOSIS — I10 ESSENTIAL HYPERTENSION: ICD-10-CM

## 2019-06-04 PROCEDURE — 4010F ACE/ARB THERAPY RXD/TAKEN: CPT | Performed by: FAMILY MEDICINE

## 2019-06-04 RX ORDER — LOSARTAN POTASSIUM 50 MG/1
TABLET ORAL
Qty: 30 TABLET | Refills: 5 | Status: SHIPPED | OUTPATIENT
Start: 2019-06-04 | End: 2019-08-12 | Stop reason: HOSPADM

## 2019-06-05 ENCOUNTER — HOSPITAL ENCOUNTER (OUTPATIENT)
Dept: PULMONOLOGY | Facility: HOSPITAL | Age: 69
Discharge: HOME | End: 2019-06-05
Attending: INTERNAL MEDICINE
Payer: COMMERCIAL

## 2019-06-05 DIAGNOSIS — R06.02 SOB (SHORTNESS OF BREATH): ICD-10-CM

## 2019-06-05 PROCEDURE — 94060 EVALUATION OF WHEEZING: CPT | Performed by: INTERNAL MEDICINE

## 2019-06-05 PROCEDURE — 94760 N-INVAS EAR/PLS OXIMETRY 1: CPT

## 2019-06-05 PROCEDURE — 94060 EVALUATION OF WHEEZING: CPT

## 2019-06-05 PROCEDURE — 94726 PLETHYSMOGRAPHY LUNG VOLUMES: CPT | Performed by: INTERNAL MEDICINE

## 2019-06-05 PROCEDURE — 94726 PLETHYSMOGRAPHY LUNG VOLUMES: CPT

## 2019-06-05 PROCEDURE — 94729 DIFFUSING CAPACITY: CPT

## 2019-06-05 PROCEDURE — 94729 DIFFUSING CAPACITY: CPT | Performed by: INTERNAL MEDICINE

## 2019-06-05 RX ORDER — ALBUTEROL SULFATE 2.5 MG/3ML
2.5 SOLUTION RESPIRATORY (INHALATION) EVERY 6 HOURS PRN
Status: DISCONTINUED | OUTPATIENT
Start: 2019-06-05 | End: 2019-06-09 | Stop reason: HOSPADM

## 2019-06-05 RX ADMIN — ALBUTEROL SULFATE 2.5 MG: 2.5 SOLUTION RESPIRATORY (INHALATION) at 11:15

## 2019-06-07 ENCOUNTER — EVALUATION (OUTPATIENT)
Dept: PHYSICAL THERAPY | Facility: CLINIC | Age: 69
End: 2019-06-07
Payer: COMMERCIAL

## 2019-06-07 ENCOUNTER — TELEPHONE (OUTPATIENT)
Dept: SLEEP CENTER | Facility: CLINIC | Age: 69
End: 2019-06-07

## 2019-06-07 DIAGNOSIS — G47.33 OSA (OBSTRUCTIVE SLEEP APNEA): Primary | ICD-10-CM

## 2019-06-07 DIAGNOSIS — IMO0001 CHRONIC VENOUS HYPERTENSION W ULCERATION, BILATERAL: ICD-10-CM

## 2019-06-07 DIAGNOSIS — I89.0 LYMPHEDEMA: Primary | ICD-10-CM

## 2019-06-07 PROCEDURE — 97016 VASOPNEUMATIC DEVICE THERAPY: CPT | Performed by: PHYSICAL THERAPIST

## 2019-06-07 PROCEDURE — 97161 PT EVAL LOW COMPLEX 20 MIN: CPT | Performed by: PHYSICAL THERAPIST

## 2019-06-10 ENCOUNTER — OFFICE VISIT (OUTPATIENT)
Dept: PODIATRY | Facility: CLINIC | Age: 69
End: 2019-06-10
Payer: COMMERCIAL

## 2019-06-10 ENCOUNTER — TELEPHONE (OUTPATIENT)
Dept: PULMONOLOGY | Facility: CLINIC | Age: 69
End: 2019-06-10

## 2019-06-10 VITALS
SYSTOLIC BLOOD PRESSURE: 140 MMHG | BODY MASS INDEX: 39.17 KG/M2 | DIASTOLIC BLOOD PRESSURE: 78 MMHG | WEIGHT: 315 LBS | HEIGHT: 75 IN

## 2019-06-10 DIAGNOSIS — I83.029: Primary | ICD-10-CM

## 2019-06-10 DIAGNOSIS — L97.929: Primary | ICD-10-CM

## 2019-06-10 DIAGNOSIS — L97.411 DIABETIC ULCER OF RIGHT HEEL ASSOCIATED WITH TYPE 2 DIABETES MELLITUS, LIMITED TO BREAKDOWN OF SKIN (HCC): ICD-10-CM

## 2019-06-10 DIAGNOSIS — E11.42 DIABETIC POLYNEUROPATHY ASSOCIATED WITH TYPE 2 DIABETES MELLITUS (HCC): ICD-10-CM

## 2019-06-10 DIAGNOSIS — L97.422 CHRONIC HEEL ULCER, LEFT, WITH FAT LAYER EXPOSED (HCC): ICD-10-CM

## 2019-06-10 DIAGNOSIS — E11.621 DIABETIC ULCER OF RIGHT HEEL ASSOCIATED WITH TYPE 2 DIABETES MELLITUS, LIMITED TO BREAKDOWN OF SKIN (HCC): ICD-10-CM

## 2019-06-10 DIAGNOSIS — E66.01 MORBID OBESITY (HCC): ICD-10-CM

## 2019-06-10 PROCEDURE — 99213 OFFICE O/P EST LOW 20 MIN: CPT | Performed by: PODIATRIST

## 2019-06-14 ENCOUNTER — APPOINTMENT (OUTPATIENT)
Dept: LAB | Facility: HOSPITAL | Age: 69
End: 2019-06-14
Attending: INTERNAL MEDICINE
Payer: COMMERCIAL

## 2019-06-14 ENCOUNTER — ANTICOAG VISIT (OUTPATIENT)
Dept: CARDIOLOGY CLINIC | Facility: CLINIC | Age: 69
End: 2019-06-14

## 2019-06-14 DIAGNOSIS — I48.20 CHRONIC ATRIAL FIBRILLATION (HCC): ICD-10-CM

## 2019-06-28 DIAGNOSIS — E11.65 UNCONTROLLED TYPE 2 DIABETES MELLITUS WITH HYPERGLYCEMIA (HCC): Primary | ICD-10-CM

## 2019-06-28 RX ORDER — PEN NEEDLE, DIABETIC 32GX 5/32"
NEEDLE, DISPOSABLE MISCELLANEOUS 3 TIMES DAILY
Qty: 300 EACH | Refills: 1 | Status: ON HOLD | OUTPATIENT
Start: 2019-06-28

## 2019-07-02 ENCOUNTER — OFFICE VISIT (OUTPATIENT)
Dept: FAMILY MEDICINE CLINIC | Facility: CLINIC | Age: 69
End: 2019-07-02
Payer: COMMERCIAL

## 2019-07-02 VITALS
OXYGEN SATURATION: 91 % | BODY MASS INDEX: 39.17 KG/M2 | HEIGHT: 75 IN | WEIGHT: 315 LBS | DIASTOLIC BLOOD PRESSURE: 72 MMHG | SYSTOLIC BLOOD PRESSURE: 142 MMHG | HEART RATE: 80 BPM | RESPIRATION RATE: 32 BRPM

## 2019-07-02 DIAGNOSIS — J45.40 MODERATE PERSISTENT ASTHMA WITHOUT COMPLICATION: Primary | ICD-10-CM

## 2019-07-02 PROCEDURE — 99213 OFFICE O/P EST LOW 20 MIN: CPT | Performed by: FAMILY MEDICINE

## 2019-07-02 PROCEDURE — 1160F RVW MEDS BY RX/DR IN RCRD: CPT | Performed by: FAMILY MEDICINE

## 2019-07-02 PROCEDURE — 1036F TOBACCO NON-USER: CPT | Performed by: FAMILY MEDICINE

## 2019-07-02 PROCEDURE — 3008F BODY MASS INDEX DOCD: CPT | Performed by: FAMILY MEDICINE

## 2019-07-02 NOTE — PATIENT INSTRUCTIONS
[place] was seen today for shortness of breath and foot swelling  Diagnoses and all orders for this visit:    Moderate persistent asthma without complication  Comments:  - Continue Albuterol inhaler as needed  - Pt   is unable to copay for Breo & thus I gave him some Advair samples which will be expiring soon  - Avoid any trigger

## 2019-07-02 NOTE — PROGRESS NOTES
Assessment/Plan:       Diagnoses and all orders for this visit:    Moderate persistent asthma without complication  Comments:  - Continue Albuterol inhaler as needed  - Pt  is unable to copay for Breo & thus I gave him some Advair samples which will be expiring soon  - Avoid any trigger          Subjective:      Patient ID: Igor Cuellar is a 79 y o  male  SOB x 1 yr  Over last 2 months pt's breathing progressively gotten worse  He got a few tests done in May including CXR & PFT  Pt  Also visited a lung specialist  Pt  Is a life-long smoker and his PFT showed severe obstruction and thus pulmonary things that his obstructive lung disease is due to asthma  The following portions of the patient's history were reviewed and updated as appropriate: allergies, past family history, past social history, past surgical history and problem list     He  has a past medical history of CHF (congestive heart failure) ([facility] ), [facility]ch[facility] disease) ([facility] ), Decubitus ulcer of heel, Diabetes mellitus ([facility] ), History of varicose veins, Hypertension, Intermittent claudication ([facility] ), Neuropathy, and Seasonal allergies  Current Outpatient Medications   Medication Sig Dispense Refill    albuterol (PROVENTIL HFA,VENTOLIN HFA) 90 mcg/act inhaler Inhale 2 puffs every 6 (six) hours as needed for wheezing 1 Inhaler 0    allopurinol (ZYLOPRIM) 300 mg tablet TAKE 1 TABLET BY MOUTH DAILY 90 tablet 1    atorvastatin (LIPITOR) 20 mg tablet TAKE 1 TABLET BY MOUTH ONCE DAILY 90 tablet 0    BD INSULIN SYRINGE U/F 31G X 5/16" 0 5 ML MISC USE AS DIRECTED WITH NOVOLIN 70/30  0    BD PEN NEEDLE HILARIO U/F 32G X 4 MM MISC by Other route 3 (three) times a day 300 each 1    glucose blood (ONE TOUCH ULTRA TEST) test strip Test TID   DX:  E11 9 50 each 5    insulin lispro (HUMALOG KWIKPEN) 100 units/mL injection pen Inject 30 Units under the skin 3 (three) times a day with meals 27 pen 3    losartan (COZAAR) 50 mg tablet TAKE 1 TABLET BY MOUTH EVERY DAY 30 tablet 5    metFORMIN (GLUCOPHAGE) 1000 MG tablet Take 1 tablet (1,000 mg total) by mouth 2 (two) times a day with meals 120 tablet 0    metoprolol tartrate (LOPRESSOR) 25 mg tablet TAKE 1 TABLET BY MOUTH EVERY 12 HOURS 180 tablet 3    spironolactone (ALDACTONE) 25 mg tablet Take 1 tablet (25 mg total) by mouth daily 30 tablet 5    torsemide (DEMADEX) 20 mg tablet TAKE 1 TABLET BY MOUTH TWICE A DAY 60 tablet 2    warfarin (COUMADIN) 5 mg tablet TAKE 1-2 TABLETS DAILY OR AS DIRECTED BY MD  60 tablet 3    fluticasone-vilanterol (BREO ELLIPTA) 100-25 mcg/inh inhaler Inhale 1 puff daily (Patient not taking: Reported on 7/2/2019) 1 Inhaler 0    Insulin Pen Needle 31G X 5 MM MISC by Does not apply route 2 (two) times a day for 50 days 100 each 0     No current facility-administered medications for this visit  Review of Systems   Constitutional: Positive for chills  Negative for fever  Respiratory: Positive for cough, shortness of breath and wheezing  Cardiovascular: Negative for chest pain and palpitations  Objective:      /72   Pulse 80   Resp (!) 32   Ht 6' 3" (1 905 m)   Wt (!) 175 kg (386 lb)   SpO2 91%   BMI 48 25 kg/m²          Physical Exam   HENT:   Mouth/Throat: Oropharynx is clear and moist    Neck: No JVD present  Cardiovascular: Normal rate  No murmur heard  Irregular pulses   Pulmonary/Chest: He is in respiratory distress  He has decreased breath sounds in the right upper field  He has wheezes in the right middle field and the left middle field  He has no rhonchi  He has no rales  Musculoskeletal:        Right lower leg: He exhibits edema

## 2019-07-04 DIAGNOSIS — I10 ESSENTIAL HYPERTENSION: ICD-10-CM

## 2019-07-04 DIAGNOSIS — IMO0002 UNCONTROLLED TYPE 2 DIABETES MELLITUS WITH COMPLICATION, WITH LONG-TERM CURRENT USE OF INSULIN: ICD-10-CM

## 2019-07-05 RX ORDER — SPIRONOLACTONE 25 MG/1
TABLET ORAL
Qty: 30 TABLET | Refills: 2 | Status: ON HOLD | OUTPATIENT
Start: 2019-07-05 | End: 2019-07-31 | Stop reason: SDUPTHER

## 2019-07-12 ENCOUNTER — APPOINTMENT (OUTPATIENT)
Dept: LAB | Facility: HOSPITAL | Age: 69
End: 2019-07-12
Attending: INTERNAL MEDICINE
Payer: COMMERCIAL

## 2019-07-12 ENCOUNTER — ANTICOAG VISIT (OUTPATIENT)
Dept: CARDIOLOGY CLINIC | Facility: CLINIC | Age: 69
End: 2019-07-12

## 2019-07-12 DIAGNOSIS — I48.20 CHRONIC ATRIAL FIBRILLATION (HCC): ICD-10-CM

## 2019-07-22 DIAGNOSIS — E11.65 UNCONTROLLED TYPE 2 DIABETES MELLITUS WITH HYPERGLYCEMIA (HCC): Primary | ICD-10-CM

## 2019-07-22 DIAGNOSIS — IMO0002 DIABETES MELLITUS TYPE 2, UNCONTROLLED: ICD-10-CM

## 2019-07-22 DIAGNOSIS — J45.40 MODERATE PERSISTENT ASTHMA WITHOUT COMPLICATION: ICD-10-CM

## 2019-07-22 RX ORDER — ALBUTEROL SULFATE 90 UG/1
2 AEROSOL, METERED RESPIRATORY (INHALATION) EVERY 6 HOURS PRN
Qty: 1 INHALER | Refills: 0 | Status: CANCELLED | OUTPATIENT
Start: 2019-07-22

## 2019-07-22 NOTE — TELEPHONE ENCOUNTER
[facility] called stating the patients insurance will not cover humalog, but they will cover novalog, please send new prescription to [facility]  Thank you

## 2019-07-23 ENCOUNTER — LAB (OUTPATIENT)
Dept: LAB | Facility: HOSPITAL | Age: 69
DRG: 616 | End: 2019-07-23
Attending: INTERNAL MEDICINE
Payer: COMMERCIAL

## 2019-07-23 ENCOUNTER — ANTICOAG VISIT (OUTPATIENT)
Dept: CARDIOLOGY CLINIC | Facility: CLINIC | Age: 69
End: 2019-07-23

## 2019-07-23 DIAGNOSIS — I48.20 CHRONIC ATRIAL FIBRILLATION (HCC): ICD-10-CM

## 2019-07-24 DIAGNOSIS — J45.40 MODERATE PERSISTENT ASTHMA WITHOUT COMPLICATION: Primary | ICD-10-CM

## 2019-07-24 NOTE — TELEPHONE ENCOUNTER
Pt stated he has not used Breo since it was prescribed 3 months ago  He said when he came in for his appt wit Dr Lexus Spence he was given samples of Advair so he assumed he was suppose to start that medication again

## 2019-07-25 ENCOUNTER — HOSPITAL ENCOUNTER (INPATIENT)
Facility: HOSPITAL | Age: 69
LOS: 18 days | Discharge: NURSING, REHAB OR LONG-TERM CARE | DRG: 616 | End: 2019-08-12
Attending: EMERGENCY MEDICINE | Admitting: HOSPITALIST
Payer: COMMERCIAL

## 2019-07-25 ENCOUNTER — APPOINTMENT (EMERGENCY)
Dept: RADIOLOGY | Facility: HOSPITAL | Age: 69
DRG: 616 | End: 2019-07-25
Payer: COMMERCIAL

## 2019-07-25 DIAGNOSIS — L97.411 DIABETIC ULCER OF RIGHT HEEL ASSOCIATED WITH TYPE 2 DIABETES MELLITUS, LIMITED TO BREAKDOWN OF SKIN (HCC): ICD-10-CM

## 2019-07-25 DIAGNOSIS — L97.529 TYPE 2 DIABETES MELLITUS WITH LEFT DIABETIC FOOT ULCER (HCC): ICD-10-CM

## 2019-07-25 DIAGNOSIS — I48.20 CHRONIC ATRIAL FIBRILLATION (HCC): ICD-10-CM

## 2019-07-25 DIAGNOSIS — L97.523 SKIN ULCER OF LEFT FOOT WITH NECROSIS OF MUSCLE (HCC): ICD-10-CM

## 2019-07-25 DIAGNOSIS — N18.3 ACUTE RENAL FAILURE SUPERIMPOSED ON STAGE 3 CHRONIC KIDNEY DISEASE, UNSPECIFIED ACUTE RENAL FAILURE TYPE: ICD-10-CM

## 2019-07-25 DIAGNOSIS — Z01.818 PREOPERATIVE CLEARANCE: ICD-10-CM

## 2019-07-25 DIAGNOSIS — E11.621 DIABETIC ULCER OF RIGHT HEEL ASSOCIATED WITH TYPE 2 DIABETES MELLITUS, LIMITED TO BREAKDOWN OF SKIN (HCC): ICD-10-CM

## 2019-07-25 DIAGNOSIS — I50.32 CHRONIC DIASTOLIC CONGESTIVE HEART FAILURE (HCC): ICD-10-CM

## 2019-07-25 DIAGNOSIS — IMO0001 INSULIN DEPENDENT DIABETES MELLITUS: ICD-10-CM

## 2019-07-25 DIAGNOSIS — R21 RASH: ICD-10-CM

## 2019-07-25 DIAGNOSIS — E11.621 TYPE 2 DIABETES MELLITUS WITH LEFT DIABETIC FOOT ULCER (HCC): ICD-10-CM

## 2019-07-25 DIAGNOSIS — N17.9 ACUTE RENAL FAILURE SUPERIMPOSED ON STAGE 3 CHRONIC KIDNEY DISEASE, UNSPECIFIED ACUTE RENAL FAILURE TYPE: ICD-10-CM

## 2019-07-25 DIAGNOSIS — L03.116 LEFT LEG CELLULITIS: Primary | ICD-10-CM

## 2019-07-25 DIAGNOSIS — I42.9 CARDIOMYOPATHY, UNSPECIFIED TYPE (HCC): ICD-10-CM

## 2019-07-25 PROBLEM — R06.02 SOB (SHORTNESS OF BREATH): Status: RESOLVED | Noted: 2019-05-07 | Resolved: 2019-07-25

## 2019-07-25 LAB
ALBUMIN SERPL BCP-MCNC: 2.7 G/DL (ref 3.5–5)
ALP SERPL-CCNC: 247 U/L (ref 46–116)
ALT SERPL W P-5'-P-CCNC: 24 U/L (ref 12–78)
ANION GAP SERPL CALCULATED.3IONS-SCNC: 6 MMOL/L (ref 4–13)
AST SERPL W P-5'-P-CCNC: 19 U/L (ref 5–45)
BASOPHILS # BLD AUTO: 0.06 THOUSANDS/ΜL (ref 0–0.1)
BASOPHILS NFR BLD AUTO: 1 % (ref 0–1)
BILIRUB SERPL-MCNC: 0.8 MG/DL (ref 0.2–1)
BUN SERPL-MCNC: 36 MG/DL (ref 5–25)
CALCIUM SERPL-MCNC: 8.9 MG/DL (ref 8.3–10.1)
CHLORIDE SERPL-SCNC: 101 MMOL/L (ref 100–108)
CO2 SERPL-SCNC: 30 MMOL/L (ref 21–32)
CREAT SERPL-MCNC: 1.32 MG/DL (ref 0.6–1.3)
EOSINOPHIL # BLD AUTO: 0.21 THOUSAND/ΜL (ref 0–0.61)
EOSINOPHIL NFR BLD AUTO: 2 % (ref 0–6)
ERYTHROCYTE [DISTWIDTH] IN BLOOD BY AUTOMATED COUNT: 15.7 % (ref 11.6–15.1)
ERYTHROCYTE [SEDIMENTATION RATE] IN BLOOD: 134 MM/HOUR (ref 0–10)
GFR SERPL CREATININE-BSD FRML MDRD: 56 ML/MIN/1.73SQ M
GLUCOSE SERPL-MCNC: 141 MG/DL (ref 65–140)
GLUCOSE SERPL-MCNC: 142 MG/DL (ref 65–140)
GLUCOSE SERPL-MCNC: 149 MG/DL (ref 65–140)
GLUCOSE SERPL-MCNC: 191 MG/DL (ref 65–140)
HCT VFR BLD AUTO: 34.2 % (ref 36.5–49.3)
HGB BLD-MCNC: 10.3 G/DL (ref 12–17)
IMM GRANULOCYTES # BLD AUTO: 0.09 THOUSAND/UL (ref 0–0.2)
IMM GRANULOCYTES NFR BLD AUTO: 1 % (ref 0–2)
LYMPHOCYTES # BLD AUTO: 1.02 THOUSANDS/ΜL (ref 0.6–4.47)
LYMPHOCYTES NFR BLD AUTO: 11 % (ref 14–44)
MCH RBC QN AUTO: 27.5 PG (ref 26.8–34.3)
MCHC RBC AUTO-ENTMCNC: 30.1 G/DL (ref 31.4–37.4)
MCV RBC AUTO: 91 FL (ref 82–98)
MONOCYTES # BLD AUTO: 0.71 THOUSAND/ΜL (ref 0.17–1.22)
MONOCYTES NFR BLD AUTO: 8 % (ref 4–12)
NEUTROPHILS # BLD AUTO: 7.06 THOUSANDS/ΜL (ref 1.85–7.62)
NEUTS SEG NFR BLD AUTO: 77 % (ref 43–75)
NRBC BLD AUTO-RTO: 0 /100 WBCS
PLATELET # BLD AUTO: 334 THOUSANDS/UL (ref 149–390)
PMV BLD AUTO: 9.9 FL (ref 8.9–12.7)
POTASSIUM SERPL-SCNC: 5.4 MMOL/L (ref 3.5–5.3)
PROT SERPL-MCNC: 8.4 G/DL (ref 6.4–8.2)
RBC # BLD AUTO: 3.74 MILLION/UL (ref 3.88–5.62)
SODIUM SERPL-SCNC: 137 MMOL/L (ref 136–145)
WBC # BLD AUTO: 9.15 THOUSAND/UL (ref 4.31–10.16)

## 2019-07-25 PROCEDURE — 93005 ELECTROCARDIOGRAM TRACING: CPT

## 2019-07-25 PROCEDURE — 80053 COMPREHEN METABOLIC PANEL: CPT | Performed by: PHYSICIAN ASSISTANT

## 2019-07-25 PROCEDURE — 94640 AIRWAY INHALATION TREATMENT: CPT

## 2019-07-25 PROCEDURE — 99284 EMERGENCY DEPT VISIT MOD MDM: CPT | Performed by: PHYSICIAN ASSISTANT

## 2019-07-25 PROCEDURE — 87070 CULTURE OTHR SPECIMN AEROBIC: CPT | Performed by: PHYSICIAN ASSISTANT

## 2019-07-25 PROCEDURE — 94760 N-INVAS EAR/PLS OXIMETRY 1: CPT

## 2019-07-25 PROCEDURE — 87186 SC STD MICRODIL/AGAR DIL: CPT | Performed by: PHYSICIAN ASSISTANT

## 2019-07-25 PROCEDURE — 82948 REAGENT STRIP/BLOOD GLUCOSE: CPT

## 2019-07-25 PROCEDURE — 85652 RBC SED RATE AUTOMATED: CPT | Performed by: PHYSICIAN ASSISTANT

## 2019-07-25 PROCEDURE — 96360 HYDRATION IV INFUSION INIT: CPT

## 2019-07-25 PROCEDURE — 94664 DEMO&/EVAL PT USE INHALER: CPT

## 2019-07-25 PROCEDURE — 99284 EMERGENCY DEPT VISIT MOD MDM: CPT

## 2019-07-25 PROCEDURE — 73630 X-RAY EXAM OF FOOT: CPT

## 2019-07-25 PROCEDURE — 87205 SMEAR GRAM STAIN: CPT | Performed by: PHYSICIAN ASSISTANT

## 2019-07-25 PROCEDURE — 85025 COMPLETE CBC W/AUTO DIFF WBC: CPT | Performed by: PHYSICIAN ASSISTANT

## 2019-07-25 PROCEDURE — 36415 COLL VENOUS BLD VENIPUNCTURE: CPT | Performed by: PHYSICIAN ASSISTANT

## 2019-07-25 PROCEDURE — 99223 1ST HOSP IP/OBS HIGH 75: CPT | Performed by: HOSPITALIST

## 2019-07-25 PROCEDURE — 87147 CULTURE TYPE IMMUNOLOGIC: CPT | Performed by: PHYSICIAN ASSISTANT

## 2019-07-25 PROCEDURE — 87040 BLOOD CULTURE FOR BACTERIA: CPT | Performed by: PHYSICIAN ASSISTANT

## 2019-07-25 RX ORDER — ALLOPURINOL 300 MG/1
300 TABLET ORAL DAILY
Status: DISCONTINUED | OUTPATIENT
Start: 2019-07-26 | End: 2019-08-06

## 2019-07-25 RX ORDER — FLUTICASONE FUROATE AND VILANTEROL 100; 25 UG/1; UG/1
1 POWDER RESPIRATORY (INHALATION) DAILY
Status: DISCONTINUED | OUTPATIENT
Start: 2019-07-26 | End: 2019-08-12 | Stop reason: HOSPADM

## 2019-07-25 RX ORDER — TORSEMIDE 20 MG/1
20 TABLET ORAL 2 TIMES DAILY
Status: DISCONTINUED | OUTPATIENT
Start: 2019-07-25 | End: 2019-07-29

## 2019-07-25 RX ORDER — LOSARTAN POTASSIUM 50 MG/1
50 TABLET ORAL DAILY
Status: DISCONTINUED | OUTPATIENT
Start: 2019-07-26 | End: 2019-07-30

## 2019-07-25 RX ORDER — ACETAMINOPHEN 325 MG/1
650 TABLET ORAL EVERY 6 HOURS PRN
Status: DISCONTINUED | OUTPATIENT
Start: 2019-07-25 | End: 2019-08-12 | Stop reason: HOSPADM

## 2019-07-25 RX ORDER — HEPARIN SODIUM 5000 [USP'U]/ML
5000 INJECTION, SOLUTION INTRAVENOUS; SUBCUTANEOUS EVERY 8 HOURS SCHEDULED
Status: DISCONTINUED | OUTPATIENT
Start: 2019-07-25 | End: 2019-07-27

## 2019-07-25 RX ORDER — ATORVASTATIN CALCIUM 20 MG/1
20 TABLET, FILM COATED ORAL DAILY
Status: DISCONTINUED | OUTPATIENT
Start: 2019-07-26 | End: 2019-08-12 | Stop reason: HOSPADM

## 2019-07-25 RX ORDER — ALBUTEROL SULFATE 90 UG/1
2 AEROSOL, METERED RESPIRATORY (INHALATION) EVERY 6 HOURS PRN
Status: DISCONTINUED | OUTPATIENT
Start: 2019-07-25 | End: 2019-08-12 | Stop reason: HOSPADM

## 2019-07-25 RX ORDER — SPIRONOLACTONE 25 MG/1
25 TABLET ORAL DAILY
Status: DISCONTINUED | OUTPATIENT
Start: 2019-07-26 | End: 2019-07-30

## 2019-07-25 RX ORDER — CEFEPIME HYDROCHLORIDE 2 G/1
2000 INJECTION, POWDER, FOR SOLUTION INTRAVENOUS EVERY 12 HOURS
Status: DISCONTINUED | OUTPATIENT
Start: 2019-07-26 | End: 2019-07-25 | Stop reason: RX

## 2019-07-25 RX ADMIN — CEFEPIME HYDROCHLORIDE 2000 MG: 2 INJECTION, SOLUTION INTRAVENOUS at 15:26

## 2019-07-25 RX ADMIN — METOPROLOL TARTRATE 25 MG: 25 TABLET, FILM COATED ORAL at 20:04

## 2019-07-25 RX ADMIN — METRONIDAZOLE 500 MG: 500 INJECTION, SOLUTION INTRAVENOUS at 16:29

## 2019-07-25 RX ADMIN — INSULIN LISPRO 30 UNITS: 100 INJECTION, SOLUTION INTRAVENOUS; SUBCUTANEOUS at 20:06

## 2019-07-25 RX ADMIN — VANCOMYCIN HYDROCHLORIDE 2750 MG: 1 INJECTION, POWDER, LYOPHILIZED, FOR SOLUTION INTRAVENOUS at 18:00

## 2019-07-25 RX ADMIN — TORSEMIDE 20 MG: 20 TABLET ORAL at 20:04

## 2019-07-25 RX ADMIN — SODIUM CHLORIDE 1000 ML: 0.9 INJECTION, SOLUTION INTRAVENOUS at 13:53

## 2019-07-25 RX ADMIN — METRONIDAZOLE 500 MG: 500 INJECTION, SOLUTION INTRAVENOUS at 23:50

## 2019-07-25 RX ADMIN — ALBUTEROL SULFATE 2 PUFF: 90 AEROSOL, METERED RESPIRATORY (INHALATION) at 21:33

## 2019-07-25 NOTE — H&P
H&P- Nancy Hernández 1950, 79 y o  male MRN: [id number]    Unit/Bed#: ED 11 Encounter: [id number]    Primary Care Provider: Zara Menezes MD   Date and time admitted to hospital: 7/25/2019  1:04 PM        Moderate persistent asthma without complication  Assessment & Plan  -cont albuterol/Advair    Chronic diastolic congestive heart failure [facility])  Assessment & Plan  Wt Readings from Last 3 Encounters:   07/25/19 (!) 175 kg (385 lb 12 9 oz)   07/02/19 (!) 175 kg (386 lb)   06/10/19 (!) 171 kg (378 lb)     -continue beta-blockers/Aldactone/Demadex        Morbid obesity ([facility] [address] )  Assessment & Plan  -encourage weight loss    Chronic atrial fibrillation ([facility] [address] )  Assessment & Plan  -hold coumadin for possible OR  -cont BB    Essential hypertension  Assessment & Plan  -continue beta-blocker/Aldactone/Cozaar    Chronic venous stasis dermatitis of both lower extremities  Assessment & Plan  -chronic  -podiatry/wound care c/s    Gout  Assessment & Plan  -continue allopurinol    * Type 2 diabetes mellitus with left diabetic foot ulcer ([facility] [address] )  Assessment & Plan  Lab Results   Component Value Date    HGBA1C 9 4 (H) 12/27/2018       Recent Labs     07/25/19  1348   POCGLU 149*       Blood Sugar Average: Last 72 hrs:  (P) 149     -IV Vanco/Cefepime/Flagyl  -cont Novolog/Metformin  -Podiatry/ID c/s  -may need OR      VTE Prophylaxis: Heparin  / reason for no mechanical VTE prophylaxis LE wounds   Code Status: FULL  POLST: POLST is not applicable to this patient    Anticipated Length of Stay:  Patient will be admitted on an Inpatient basis with an anticipated length of stay of  >2 midnights  Justification for Hospital Stay: L foot ulcer    Total Time for Visit, including Counseling / Coordination of Care: 45 minutes  Greater than 50% of this total time spent on direct patient counseling and coordination of care  Chief Complaint:   Worsening L foot wound    History of Present Illness:    Nancy Hernández is a 79 y o  male who presents with worsening of his left foot wound and pain  The patient has chronic bilateral lower extremity wounds due to chronic lymphedema and resulting chronic venous stasis dermatitis  Patient reports that the wound on the dorsal aspect of his ankle joint has had more cloudy drainage recently  He has also had more pain in his left foot  Patient denies any other acute complaints  Review of Systems:    Review of Systems   All other systems reviewed and are negative  Past Medical and Surgical History:     Past Medical History:   Diagnosis Date    CHF (congestive heart failure) ([facility] [address] )     COPD (chronic obstructive pulmonary disease) ([facility])     Decubitus ulcer of heel     LAST ASSESSED [id number]    Diabetes mellitus ([facility] [address] )     History of varicose veins     Hypertension     Intermittent claudication ([facility])     Neuropathy     Seasonal allergies        Past Surgical History:   Procedure Laterality Date    ANGIOPLASTY      W/ FLUOROSC ANGIOGRAPGY PERIPHERAL ARTERY ADDITIONAL  LAST ASSESSED [id number]    ANGIOPLASTY / STENTING FEMORAL      TANSCATH INTRAVASCULAR STENT PLACEMENT PERCUTANEOUS FEMORAL     FULL THICKNESS SKIN GRAFT      TENDON REPAIR      TOE AMPUTATION Left 12/27/2018    Procedure: AMPUTATION left 4th TOE;  Surgeon: Esau Forbes DPM;  Location: [facility] OR;  Service: Podiatry       Meds/Allergies:    Prior to Admission medications    Medication Sig Start Date End Date Taking?  Authorizing Provider   albuterol (PROVENTIL HFA,VENTOLIN HFA) 90 mcg/act inhaler Inhale 2 puffs every 6 (six) hours as needed for wheezing 4/26/19   Jaclyn Newsome MD   allopurinol (ZYLOPRIM) 300 mg tablet TAKE 1 TABLET BY MOUTH DAILY 3/1/19   Jaclyn Newsome MD   atorvastatin (LIPITOR) 20 mg tablet TAKE 1 TABLET BY MOUTH ONCE DAILY 5/7/19   Jaclyn Newsome MD   BD INSULIN SYRINGE U/F 31G X 5/16" 0 5 ML MISC USE AS DIRECTED WITH NOVOLIN 70/30 12/29/18   Historical Provider, MD   BD PEN NEEDLE HILARIO U/F 32G X 4 MM MISC by Other route 3 (three) times a day 6/28/19   Ruperto Mesa MD   fluticasone-salmeterol (ADVAIR DISKUS, [facility]) 250-50 mcg/dose inhaler Inhale 1 puff 2 (two) times a day Rinse mouth after use  7/24/19   Ruperto Mesa MD   glucose blood (ONE TOUCH ULTRA TEST) test strip Test TID  DX:  E11 9 5/30/19   NICOLA Staton   insulin aspart (NOVOLOG FLEXPEN) 100 Units/mL injection pen Inject 30 Units under the skin 3 (three) times a day with meals 7/22/19   Ruperto Mesa MD   Insulin Pen Needle 31G X 5 MM MISC by Does not apply route 2 (two) times a day for 50 days 12/28/18 5/7/19  Wang Eldridge PA-C   losartan (COZAAR) 50 mg tablet TAKE 1 TABLET BY MOUTH EVERY DAY 6/4/19   Ruperto Mesa MD   metFORMIN (GLUCOPHAGE) 1000 MG tablet TAKE 1 TABLET BY MOUTH TWICE A DAY WITH MEALS 7/5/19   Ruperto Mesa MD   metoprolol tartrate (LOPRESSOR) 25 mg tablet TAKE 1 TABLET BY MOUTH EVERY 12 HOURS 9/30/18   Taisha Goins MD   spironolactone (ALDACTONE) 25 mg tablet TAKE 1 TABLET BY MOUTH EVERY DAY 7/5/19   Ruperto Mesa MD   torsemide (DEMADEX) 20 mg tablet TAKE 1 TABLET BY MOUTH TWICE A DAY 10/19/18   Ruperto Mesa MD   warfarin (COUMADIN) 5 mg tablet TAKE 1-2 TABLETS DAILY OR AS DIRECTED BY MD  5/12/19   Taisha Goins MD     I have reviewed home medications with a medical source (PCP, Pharmacy, other)  Allergies:    Allergies   Allergen Reactions    Latex Rash       Social History:     Marital Status:    Substance Use History:   Social History     Substance and Sexual Activity   Alcohol Use No     Social History     Tobacco Use   Smoking Status Never Smoker   Smokeless Tobacco Never Used     Social History     Substance and Sexual Activity   Drug Use No       Family History:    non-contributory    Physical Exam:     Vitals:   Blood Pressure: 142/65 (07/25/19 1415)  Pulse: 85 (07/25/19 1415)  Temperature: 98 6 °F (37 °C) (07/25/19 1255)  Temp Source: Tympanic (07/25/19 1255)  Respirations: (!) 24 (07/25/19 1415)  Height: 6' 3" (190 5 cm) (07/25/19 1255)  Weight - Scale: (!) 175 kg (385 lb 12 9 oz) (07/25/19 1255)  SpO2: 96 % (07/25/19 1415)    Physical Exam  Gen: NAD, AAOx3, well developed, well nourished  Eyes: EOMI, PERRLA, no scleral icterus  ENMT:  Oropharynx clear of erythema or exudates, no nasal discharge, no otic discharge, moist mucous membranes  Neck:  Supple  Lymph:  No anterior or posterior cervical or supraclavicular lymphadenopathy  Cardiovascular:  Regular rate and rhythm, normal S1-S2, no murmurs, rubs, or gallops  Lungs:  Clear to auscultation bilaterally, no wheezes, or rales, or rhonchi  Abdomen:  Positive bowel sounds, soft, nontender, nondistended, no palpable organomegaly   Skin:  4+ B/L LE lymphedema and chronic venous stasis dermatitis  Anterior aspect of the left ankle joint with 3 cm in diameter ulcer with cloudy serous drainage  The edges of the ulcer black  Neuro: Cranial nerves 2-12 are intact, non-focal, moves all 4 extremities    Additional Data:     Lab Results: I have personally reviewed pertinent reports  Results from last 7 days   Lab Units 07/25/19  1345   WBC Thousand/uL 9 15   HEMOGLOBIN g/dL 10 3*   HEMATOCRIT % 34 2*   PLATELETS Thousands/uL 334   NEUTROS PCT % 77*   LYMPHS PCT % 11*   MONOS PCT % 8   EOS PCT % 2     Results from last 7 days   Lab Units 07/25/19  1345   SODIUM mmol/L 137   POTASSIUM mmol/L 5 4*   CHLORIDE mmol/L 101   CO2 mmol/L 30   BUN mg/dL 36*   CREATININE mg/dL 1 32*   ANION GAP mmol/L 6   CALCIUM mg/dL 8 9   ALBUMIN g/dL 2 7*   TOTAL BILIRUBIN mg/dL 0 80   ALK PHOS U/L 247*   ALT U/L 24   AST U/L 19   GLUCOSE RANDOM mg/dL 141*     Results from last 7 days   Lab Units 07/23/19  1406   INR  1 88*     Results from last 7 days   Lab Units 07/25/19  1348   POC GLUCOSE mg/dl 149*               Imaging: I have personally reviewed pertinent reports        XR foot 3+ views LEFT   Final Result by Germán Gonzales DO (07/25 1416)   Limited exam due to patient's condition  Worsening Charcot joint at midfoot  Worsening soft tissue swelling  Shallow ulceration plantar aspect of the midfoot  Could not radiographically confirm an abscess or osteomyelitis  Recommend MRI or Ceretec bone scan if osteomyelitis is clinically suspected  The study was marked in [facility] for immediate notification  Workstation performed: [id number]             Allscripts / Epic Records Reviewed: Yes     ** Please Note: This note has been constructed using a voice recognition system   **

## 2019-07-25 NOTE — ASSESSMENT & PLAN NOTE
Lab Results   Component Value Date    HGBA1C 9 4 (H) 12/27/2018       Recent Labs     07/25/19  1348   POCGLU 149*       Blood Sugar Average: Last 72 hrs:  (P) 149     -IV Vanco/Cefepime/Flagyl  -cont Novolog/Metformin  -Podiatry/ID c/s  -may need OR

## 2019-07-25 NOTE — PROGRESS NOTES
Vancomycin Assessment    Jose J Cabello is a 79 y o  male who is currently receiving vancomycin 2750 mg IV q12h for skin-soft tissue infection     Relevant clinical data and objective history reviewed:  Creatinine   Date Value Ref Range Status   07/25/2019 1 32 (H) 0 60 - 1 30 mg/dL Final     Comment:     Standardized to IDMS reference method   03/18/2019 1 31 (H) 0 60 - 1 30 mg/dL Final     Comment:     Standardized to IDMS reference method   02/26/2019 1 38 (H) 0 60 - 1 30 mg/dL Final     Comment:     Standardized to IDMS reference method   01/15/2018 0 99 0 70 - 1 25 mg/dL Final     Comment:     Result Comment: For patients >52years of age, the reference limit  for Creatinine is approximately 13% higher for people  identified as -American      05/25/2017 1 31 (H) 0 70 - 1 25 mg/dL Final     Comment:     Result Comment: For patients >52years of age, the reference limit  for Creatinine is approximately 13% higher for people  identified as -American      12/09/2016 1 18 0 70 - 1 25 mg/dL Final     Comment:     Result Comment: For patients >52years of age, the reference limit  for Creatinine is approximately 13% higher for people  identified as -American  /63 (BP Location: Right arm)   Pulse 74   Temp 98 6 °F (37 °C) (Tympanic)   Resp (!) 24   Ht 6' 3" (1 905 m)   Wt (!) 175 kg (385 lb 12 9 oz)   SpO2 97%   BMI 48 22 kg/m²   I/O last 3 completed shifts:   In: 100 [IV Piggyback:100]  Out: -   Lab Results   Component Value Date/Time    BUN 36 (H) 07/25/2019 01:45 PM    BUN 27 (H) 01/23/2018 02:29 PM    WBC 9 15 07/25/2019 01:45 PM    WBC 5 16 12/23/2015 06:00 AM    HGB 10 3 (L) 07/25/2019 01:45 PM    HGB 10 6 (L) 12/23/2015 06:00 AM    HCT 34 2 (L) 07/25/2019 01:45 PM    HCT 34 5 (L) 12/23/2015 06:00 AM    MCV 91 07/25/2019 01:45 PM    MCV 91 12/23/2015 06:00 AM     07/25/2019 01:45 PM     12/23/2015 06:00 AM     Temp Readings from Last 3 Encounters: 07/25/19 98 6 °F (37 °C) (Tympanic)   05/07/19 97 9 °F (36 6 °C)   04/26/19 97 5 °F (36 4 °C) (Temporal)     Vancomycin Days of Therapy: 1    Assessment/Plan  The patient is currently on vancomycin utilizing scheduled dosing based on adjusted body weight (due to obesity)  Baseline risks associated with therapy include: concomitant nephrotoxic medications and advanced age  The patient is currently receiving 2750 mg IV q12h and after clinical evaluation will be changed to 2500 mg IV q12h  Pharmacy will also follow closely for s/sx of nephrotoxicity, infusion reactions and appropriateness of therapy  BMP and CBC will be ordered per protocol  Plan for trough as patient approaches steady state, prior to the 4th  dose at approximately 1530 on 7/27/19  Due to infection severity, will target a trough of 15-20 (appropriate for most indications)   Pharmacy will continue to follow the patients culture results and clinical progress daily      Antwan Arora, Pharmacist

## 2019-07-25 NOTE — ASSESSMENT & PLAN NOTE
Wt Readings from Last 3 Encounters:   07/25/19 (!) 175 kg (385 lb 12 9 oz)   07/02/19 (!) 175 kg (386 lb)   06/10/19 (!) 171 kg (378 lb)     -continue beta-blockers/Aldactone/Demadex

## 2019-07-25 NOTE — PLAN OF CARE
Problem: Potential for Falls  Goal: Patient will remain free of falls  Description  INTERVENTIONS:  - Assess patient frequently for physical needs  -  Identify cognitive and physical deficits and behaviors that affect risk of falls    -  [place] fall precautions as indicated by assessment   - Educate patient/family on patient safety including physical limitations  - Instruct patient to call for assistance with activity based on assessment  - Modify environment to reduce risk of injury  - Consider OT/PT consult to assist with strengthening/mobility  Outcome: Progressing     Problem: Prexisting or High Potential for Compromised Skin Integrity  Goal: Skin integrity is maintained or improved  Description  INTERVENTIONS:  - Identify patients at risk for skin breakdown  - Assess and monitor skin integrity  - Assess and monitor nutrition and hydration status  - Monitor labs (i e  albumin)  - Assess for incontinence   - Turn and reposition patient  - Assist with mobility/ambulation  - Relieve pressure over bony prominences  - Avoid friction and shearing  - Provide appropriate hygiene as needed including keeping skin clean and dry  - Evaluate need for skin moisturizer/barrier cream  - Collaborate with interdisciplinary team (i e  Nutrition, Rehabilitation, etc )   - Patient/family teaching  Outcome: Progressing     Problem: SKIN/TISSUE INTEGRITY - ADULT  Goal: Skin integrity remains intact  Description  INTERVENTIONS  - Identify patients at risk for skin breakdown  - Assess and monitor skin integrity  - Assess and monitor nutrition and hydration status  - Monitor labs (i e  albumin)  - Assess for incontinence   - Turn and reposition patient  - Assist with mobility/ambulation  - Relieve pressure over bony prominences  - Avoid friction and shearing  - Provide appropriate hygiene as needed including keeping skin clean and dry  - Evaluate need for skin moisturizer/barrier cream  - Collaborate with interdisciplinary team (i e  Nutrition, Rehabilitation, etc )   - Patient/family teaching  Outcome: Progressing  Goal: Incision(s), wounds(s) or drain site(s) healing without S/S of infection  Description  INTERVENTIONS  - Assess and document risk factors for skin impairment   - Assess and document dressing, incision, wound bed, drain sites and surrounding tissue  - Initiate Nutrition services consult and/or wound management as needed  Outcome: Progressing     Problem: MUSCULOSKELETAL - ADULT  Goal: Maintain or return mobility to safest level of function  Description  INTERVENTIONS:  - Assess patient's ability to carry out ADLs; assess patient's baseline for ADL function and identify physical deficits which impact ability to perform ADLs (bathing, care of mouth/teeth, toileting, grooming, dressing, etc )  - Assess/evaluate cause of self-care deficits   - Assess range of motion  - Assess patient's mobility; develop plan if impaired  - Assess patient's need for assistive devices and provide as appropriate  - Encourage maximum independence but intervene and supervise when necessary  - Involve family in performance of ADLs  - Assess for home care needs following discharge   - Request OT consult to assist with ADL evaluation and planning for discharge  - Provide patient education as appropriate  Outcome: Progressing     Problem: PAIN - ADULT  Goal: Verbalizes/displays adequate comfort level or baseline comfort level  Description  Interventions:  - Encourage patient to monitor pain and request assistance  - Assess pain using appropriate pain scale  - Administer analgesics based on type and severity of pain and evaluate response  - Implement non-pharmacological measures as appropriate and evaluate response  - Consider cultural and social influences on pain and pain management  - Notify physician/advanced practitioner if interventions unsuccessful or patient reports new pain  Outcome: Progressing     Problem: INFECTION - ADULT  Goal: Absence or prevention of progression during hospitalization  Description  INTERVENTIONS:  - Assess and monitor for signs and symptoms of infection  - Monitor lab/diagnostic results  - Monitor all insertion sites, i e  indwelling lines, tubes, and drains  - Monitor endotracheal (as able) and nasal secretions for changes in amount and color  - Cut Off appropriate cooling/warming therapies per order  - Administer medications as ordered  - Instruct and encourage patient and family to use good hand hygiene technique  - Identify and instruct in appropriate isolation precautions for identified infection/condition  Outcome: Progressing     Problem: SAFETY ADULT  Goal: Patient will remain free of falls  Description  INTERVENTIONS:  - Assess patient frequently for physical needs  -  Identify cognitive and physical deficits and behaviors that affect risk of falls    -  Cut Off fall precautions as indicated by assessment   - Educate patient/family on patient safety including physical limitations  - Instruct patient to call for assistance with activity based on assessment  - Modify environment to reduce risk of injury  - Consider OT/PT consult to assist with strengthening/mobility  Outcome: Progressing     Problem: DISCHARGE PLANNING  Goal: Discharge to home or other facility with appropriate resources  Description  INTERVENTIONS:  - Identify barriers to discharge w/patient and caregiver  - Arrange for needed discharge resources and transportation as appropriate  - Identify discharge learning needs (meds, wound care, etc )  - Arrange for interpretive services to assist at discharge as needed  - Refer to Case Management Department for coordinating discharge planning if the patient needs post-hospital services based on physician/advanced practitioner order or complex needs related to functional status, cognitive ability, or social support system  Outcome: Progressing     Problem: Knowledge Deficit  Goal: Patient/family/caregiver demonstrates understanding of disease process, treatment plan, medications, and discharge instructions  Description  Complete learning assessment and assess knowledge base    Interventions:  - Provide teaching at level of understanding  - Provide teaching via preferred learning methods  Outcome: Progressing

## 2019-07-25 NOTE — ED PROVIDER NOTES
History  Chief Complaint   Patient presents with    Wound Infection     pt presents to ED via EMS from home c/o bilateral severe wounds that are draining and malodoros  Pt states they are 9/10 pain  Pt also states he is severely SOB upon any exertion to the point where he is unable to walk to the bathroom      Patient is a 79year old male with PMH of HTN, DM, COPD, CHF, and neuropathy presenting to the ED via EMS complaining of b/l lower extremity wounds and SOB  Patient reports the wounds have been worsening for months but he has not been to the wound clinic or been seen by his PCP  Both legs are painful, have open draining areas, and are malodorous  He has also been out of his Advair inhaler and complains of SOB and dyspnea  Prior to Admission Medications   Prescriptions Last Dose Informant Patient Reported? Taking? BD INSULIN SYRINGE U/F 31G X 5/16" 0 5 ML MISC  Self Yes No   Sig: USE AS DIRECTED WITH NOVOLIN 70/30   BD PEN NEEDLE HILARIO U/F 32G X 4 MM MISC  Self No No   Sig: by Other route 3 (three) times a day   Insulin Pen Needle 31G X 5 MM MISC  Self No No   Sig: by Does not apply route 2 (two) times a day for 50 days   albuterol (PROVENTIL HFA,VENTOLIN HFA) 90 mcg/act inhaler  Self No No   Sig: Inhale 2 puffs every 6 (six) hours as needed for wheezing   allopurinol (ZYLOPRIM) 300 mg tablet  Self No No   Sig: TAKE 1 TABLET BY MOUTH DAILY   atorvastatin (LIPITOR) 20 mg tablet  Self No No   Sig: TAKE 1 TABLET BY MOUTH ONCE DAILY   fluticasone-salmeterol (ADVAIR DISKUS, WIXELA INHUB) 250-50 mcg/dose inhaler   No No   Sig: Inhale 1 puff 2 (two) times a day Rinse mouth after use  glucose blood (ONE TOUCH ULTRA TEST) test strip  Self No No   Sig: Test TID   DX:  E11 9   insulin aspart (NOVOLOG FLEXPEN) 100 Units/mL injection pen   No No   Sig: Inject 30 Units under the skin 3 (three) times a day with meals   losartan (COZAAR) 50 mg tablet  Self No No   Sig: TAKE 1 TABLET BY MOUTH EVERY DAY   metFORMIN (GLUCOPHAGE) 1000 MG tablet   No No   Sig: TAKE 1 TABLET BY MOUTH TWICE A DAY WITH MEALS   metoprolol tartrate (LOPRESSOR) 25 mg tablet  Self No No   Sig: TAKE 1 TABLET BY MOUTH EVERY 12 HOURS   spironolactone (ALDACTONE) 25 mg tablet   No No   Sig: TAKE 1 TABLET BY MOUTH EVERY DAY   torsemide (DEMADEX) 20 mg tablet  Self No No   Sig: TAKE 1 TABLET BY MOUTH TWICE A DAY   warfarin (COUMADIN) 5 mg tablet  Self No No   Sig: TAKE 1-2 TABLETS DAILY OR AS DIRECTED BY MD       Facility-Administered Medications: None       Past Medical History:   Diagnosis Date    CHF (congestive heart failure) (HCC)     COPD (chronic obstructive pulmonary disease) (HCC)     Decubitus ulcer of heel     LAST ASSESSED [id number]    Diabetes mellitus (HCC)     History of varicose veins     Hypertension     Intermittent claudication (HCC)     Neuropathy     Seasonal allergies        Past Surgical History:   Procedure Laterality Date    ANGIOPLASTY      W/ FLUOROSC ANGIOGRAPGY PERIPHERAL ARTERY ADDITIONAL  LAST ASSESSED [id number]    ANGIOPLASTY / STENTING FEMORAL      TANSCATH INTRAVASCULAR STENT PLACEMENT PERCUTANEOUS FEMORAL     FULL THICKNESS SKIN GRAFT      TENDON REPAIR      TOE AMPUTATION Left 12/27/2018    Procedure: AMPUTATION left 4th TOE;  Surgeon: Danae Carlos DPM;  Location: [facility];  Service: Podiatry       Family History   Problem Relation Age of Onset    Other Mother         CARDIAC DISORDER     Diabetes Mother     Cancer Father     Other Family         CARDIAC DISORDER     Diabetes Family     Cancer Family     Mental illness Family      I have reviewed and agree with the history as documented  Social History     Tobacco Use    Smoking status: Never Smoker    Smokeless tobacco: Never Used   Substance Use Topics    Alcohol use: Not Currently    Drug use: Not Currently        Review of Systems   Constitutional: Negative for chills and fever     HENT: Negative for congestion, rhinorrhea, sneezing and sore throat  Respiratory: Positive for cough and shortness of breath  Negative for chest tightness, wheezing and stridor  Cardiovascular: Positive for leg swelling  Negative for chest pain and palpitations  Gastrointestinal: Negative for abdominal pain, constipation, diarrhea, nausea and vomiting  Musculoskeletal: Positive for gait problem  Skin: Positive for color change and wound  Neurological: Positive for numbness (neuropathy b/l feet)  Negative for dizziness, speech difficulty, weakness and headaches  All other systems reviewed and are negative  Physical Exam  Physical Exam   Constitutional: He is oriented to person, place, and time  Vital signs are normal  He appears well-developed and well-nourished  He is cooperative  Non-toxic appearance  No distress  HENT:   Head: Normocephalic and atraumatic  Mouth/Throat: Uvula is midline, oropharynx is clear and moist and mucous membranes are normal    Eyes: Pupils are equal, round, and reactive to light  No scleral icterus  Neck: No JVD present  Cardiovascular: Normal rate  An irregularly irregular rhythm present  Exam reveals no gallop and no friction rub  No murmur heard  Pulmonary/Chest: Effort normal  No accessory muscle usage  No respiratory distress  He has decreased breath sounds  He has no wheezes  He has no rhonchi  He has no rales  Abdominal: Soft  Normal appearance and bowel sounds are normal  There is no tenderness  Neurological: He is alert and oriented to person, place, and time  Skin: Skin is warm and dry  He is not diaphoretic  Erythema, swelling, and multiple open wounds to b/l LE and feet  See images   Nursing note and vitals reviewed                        Vital Signs  ED Triage Vitals [07/25/19 1255]   Temperature Pulse Respirations Blood Pressure SpO2   98 6 °F (37 °C) 79 20 121/57 96 %      Temp Source Heart Rate Source Patient Position - Orthostatic VS BP Location FiO2 (%)   Tympanic Monitor Sitting Right arm --      Pain Score       8           Vitals:    07/25/19 1415 07/25/19 1835 07/25/19 1921 07/25/19 2139   BP: 142/65 144/63 144/80    Pulse: 85 74 74 78   Patient Position - Orthostatic VS: Sitting Sitting Lying          Visual Acuity      ED Medications  Medications   albuterol (PROVENTIL HFA,VENTOLIN HFA) inhaler 2 puff (2 puffs Inhalation Given 7/25/19 2133)   allopurinol (ZYLOPRIM) tablet 300 mg (has no administration in time range)   atorvastatin (LIPITOR) tablet 20 mg (has no administration in time range)   fluticasone-vilanterol (BREO ELLIPTA) 100-25 mcg/inh inhaler 1 puff (has no administration in time range)   insulin lispro (HumaLOG) 100 units/mL subcutaneous injection 30 Units (30 Units Subcutaneous Given 7/25/19 2006)   torsemide (DEMADEX) tablet 20 mg (20 mg Oral Given 7/25/19 2004)   spironolactone (ALDACTONE) tablet 25 mg (has no administration in time range)   metoprolol tartrate (LOPRESSOR) tablet 25 mg (25 mg Oral Given 7/25/19 2004)   metFORMIN (GLUCOPHAGE) tablet 1,000 mg (has no administration in time range)   losartan (COZAAR) tablet 50 mg (has no administration in time range)   metroNIDAZOLE (FLAGYL) IVPB (premix) 500 mg (has no administration in time range)   heparin (porcine) subcutaneous injection 5,000 Units (5,000 Units Subcutaneous Not Given 7/25/19 2118)   insulin lispro (HumaLOG) 100 units/mL subcutaneous injection 2-12 Units (has no administration in time range)   acetaminophen (TYLENOL) tablet 650 mg (has no administration in time range)   vancomycin (VANCOCIN) 2,500 mg in sodium chloride 0 9 % 500 mL IVPB (has no administration in time range)   cefepime (MAXIPIME) 2 g/50 mL dextrose IVPB (has no administration in time range)   sodium chloride 0 9 % bolus 1,000 mL (1,000 mL Intravenous New Bag 7/25/19 1353)   cefepime (MAXIPIME) 2 g/50 mL dextrose IVPB (0 mg Intravenous Stopped 7/25/19 1756)   vancomycin (VANCOCIN) 2,750 mg in sodium chloride 0 9 % 500 mL IVPB (2,750 mg Intravenous New Bag 7/25/19 1800)   metroNIDAZOLE (FLAGYL) IVPB (premix) 500 mg (0 mg Intravenous Stopped 7/25/19 1837)       Diagnostic Studies  Results Reviewed     Procedure Component Value Units Date/Time    Wound culture and Gram stain [[id number]]  (Abnormal) Collected:  07/25/19 1351    Lab Status:  Preliminary result Specimen:  Wound from Foot, Left Updated:  07/25/19 2224     Gram Stain Result No polys seen      4+ Gram positive rods      3+ Gram negative rods      3+ Gram positive cocci in pairs    Wound culture and Gram stain [[id number]]  (Abnormal) Collected:  07/25/19 1351    Lab Status:  Preliminary result Specimen:  Wound from Foot, Left Updated:  07/25/19 2217     Gram Stain Result No polys seen      1+ Gram positive cocci in pairs      1+ Gram positive rods    Platelet count [[id number]]     Lab Status:  No result Specimen:  Blood     Sedimentation rate, automated [[id number]]  (Abnormal) Collected:  07/25/19 1345    Lab Status:  Final result Specimen:  Blood from Arm, Right Updated:  07/25/19 1524     Sed Rate 134 mm/hour     Comprehensive metabolic panel [[id number]]  (Abnormal) Collected:  07/25/19 1345    Lab Status:  Final result Specimen:  Blood from Arm, Right Updated:  07/25/19 1436     Sodium 137 mmol/L      Potassium 5 4 mmol/L      Chloride 101 mmol/L      CO2 30 mmol/L      ANION GAP 6 mmol/L      BUN 36 mg/dL      Creatinine 1 32 mg/dL      Glucose 141 mg/dL      Calcium 8 9 mg/dL      AST 19 U/L      ALT 24 U/L      Alkaline Phosphatase 247 U/L      Total Protein 8 4 g/dL      Albumin 2 7 g/dL      Total Bilirubin 0 80 mg/dL      eGFR 56 ml/min/1 73sq m     Narrative:       [place] guidelines for Chronic Kidney Disease (CKD):     Stage 1 with normal or high GFR (GFR > 90 mL/min/1 73 square meters)    Stage 2 Mild CKD (GFR = 60-89 mL/min/1 73 square meters)    Stage 3A Moderate CKD (GFR = 45-59 mL/min/1 73 square meters)    Stage 3B Moderate CKD (GFR = 30-44 mL/min/1 73 square meters)    Stage 4 Severe CKD (GFR = 15-29 mL/min/1 73 square meters)    Stage 5 End Stage CKD (GFR <15 mL/min/1 73 square meters)  Note: GFR calculation is accurate only with a steady state creatinine    CBC and differential [[id number]]  (Abnormal) Collected:  07/25/19 1345    Lab Status:  Final result Specimen:  Blood from Arm, Right Updated:  07/25/19 1433     WBC 9 15 Thousand/uL      RBC 3 74 Million/uL      Hemoglobin 10 3 g/dL      Hematocrit 34 2 %      MCV 91 fL      MCH 27 5 pg      MCHC 30 1 g/dL      RDW 15 7 %      MPV 9 9 fL      Platelets 364 Thousands/uL      nRBC 0 /100 WBCs      Neutrophils Relative 77 %      Immat GRANS % 1 %      Lymphocytes Relative 11 %      Monocytes Relative 8 %      Eosinophils Relative 2 %      Basophils Relative 1 %      Neutrophils Absolute 7 06 Thousands/µL      Immature Grans Absolute 0 09 Thousand/uL      Lymphocytes Absolute 1 02 Thousands/µL      Monocytes Absolute 0 71 Thousand/µL      Eosinophils Absolute 0 21 Thousand/µL      Basophils Absolute 0 06 Thousands/µL     Blood culture #2 [[id number]] Collected:  07/25/19 1346    Lab Status: In process Specimen:  Blood from Arm, Right Updated:  07/25/19 1418    Blood culture #1 [[id number]] Collected:  07/25/19 1404    Lab Status: In process Specimen:  Blood from Arm, Right Updated:  07/25/19 1418    Fingerstick Glucose (POCT) [[id number]]  (Abnormal) Collected:  07/25/19 1348    Lab Status:  Final result Updated:  07/25/19 1354     POC Glucose 149 mg/dl                  XR foot 3+ views LEFT   Final Result by Benjamen Bamberger, DO (07/25 1416)   Limited exam due to patient's condition  Worsening Charcot joint at midfoot  Worsening soft tissue swelling  Shallow ulceration plantar aspect of the midfoot  Could not radiographically confirm an abscess or osteomyelitis  Recommend MRI or Ceretec bone scan if osteomyelitis is clinically suspected        The study was marked in [facility] for immediate notification  Workstation performed: [id number]                    Procedures  ECG 12 Lead Documentation Only  Date/Time: 7/25/2019 1:10 PM  Performed by: Yanni Forrest PA-C  Authorized by: Yanni Forrest PA-C     Indications / Diagnosis:  Cellulitis  ECG reviewed by me, the ED Provider: yes    Patient location:  ED  Previous ECG:     Previous ECG:  Compared to current    Similarity:  No change  Interpretation:     Interpretation: non-specific    Rate:     ECG rate:  84    ECG rate assessment: normal    Rhythm:     Rhythm: atrial fibrillation    Ectopy:     Ectopy: none    QRS:     QRS axis:  Normal    QRS intervals:  Normal  Conduction:     Conduction: normal    ST segments:     ST segments:  Normal  T waves:     T waves: normal             ED Course                               MDM  Number of Diagnoses or Management Options  Insulin dependent diabetes mellitus [facility]):   Left leg cellulitis: new and requires workup  Skin ulcer of left foot with necrosis of muscle ([address] ): new and requires workup  Diagnosis management comments: 65-year-old male presents with profound left lower extremity cellulitis and necrosis of the foot  His labs are reassuring    He will be admitted to [facility] on IV antibiotics for podiatry consultation       Amount and/or Complexity of Data Reviewed  Clinical lab tests: ordered and reviewed  Tests in the radiology section of CPT®: ordered and reviewed  Tests in the medicine section of CPT®: ordered and reviewed  Review and summarize past medical records: yes  Discuss the patient with other providers: yes  Independent visualization of images, tracings, or specimens: yes        Disposition  Final diagnoses:   Left leg cellulitis   Skin ulcer of left foot with necrosis of muscle ([address] )   Insulin dependent diabetes mellitus ([address] )     Time reflects when diagnosis was documented in both MDM as applicable and the Disposition within this note     Time User Action Codes Description Comment 7/25/2019  2:52 PM Robyn Waterford Add [X02 900] Left leg cellulitis     7/25/2019  2:53 PM Robyn Waterford Add [J15 655] Skin ulcer of left foot with necrosis of muscle ([address] )     7/25/2019  2:53 PM Roybn Waterford Add [E11 9,  Z79 4] Insulin dependent diabetes mellitus [facility])       ED Disposition     ED Disposition Condition Date/Time Comment    Admit Stable u Jul 25, 2019  2:52 PM Case was discussed with Dr Belkis Cunningham and the patient's admission status was agreed to be Admission Status: inpatient status to the service of Dr Belkis Cunningham   Follow-up Information    None         Current Discharge Medication List      CONTINUE these medications which have NOT CHANGED    Details   albuterol (PROVENTIL HFA,VENTOLIN HFA) 90 mcg/act inhaler Inhale 2 puffs every 6 (six) hours as needed for wheezing  Qty: 1 Inhaler, Refills: 0    Comments: Substitution to a formulary equivalent within the same pharmaceutical class is authorized  Associated Diagnoses: Moderate persistent asthma without complication      allopurinol (ZYLOPRIM) 300 mg tablet TAKE 1 TABLET BY MOUTH DAILY  Qty: 90 tablet, Refills: 1    Associated Diagnoses: Chronic gout without tophus, unspecified cause, unspecified site      atorvastatin (LIPITOR) 20 mg tablet TAKE 1 TABLET BY MOUTH ONCE DAILY  Qty: 90 tablet, Refills: 0    Associated Diagnoses: Hyperlipidemia LDL goal <70      BD INSULIN SYRINGE U/F 31G X 5/16" 0 5 ML MISC USE AS DIRECTED WITH NOVOLIN 70/30  Refills: 0      BD PEN NEEDLE HILARIO U/F 32G X 4 MM MISC by Other route 3 (three) times a day  Qty: 300 each, Refills: 1    Associated Diagnoses: Uncontrolled type 2 diabetes mellitus with hyperglycemia (HCC)      fluticasone-salmeterol (ADVAIR DISKUS, WIXELA INHUB) 250-50 mcg/dose inhaler Inhale 1 puff 2 (two) times a day Rinse mouth after use  Qty: 1 Inhaler, Refills: 5    Comments: Substitution to a formulary equivalent within the same pharmaceutical class is authorized  Associated Diagnoses:  Moderate persistent asthma without complication      glucose blood (ONE TOUCH ULTRA TEST) test strip Test TID  DX:  E11 9  Qty: 50 each, Refills: 5    Associated Diagnoses: Uncontrolled type 2 diabetes mellitus with hyperglycemia (HCC)      insulin aspart (NOVOLOG FLEXPEN) 100 Units/mL injection pen Inject 30 Units under the skin 3 (three) times a day with meals  Qty: 15 pen, Refills: 1    Associated Diagnoses: Uncontrolled type 2 diabetes mellitus with hyperglycemia (HCC)      losartan (COZAAR) 50 mg tablet TAKE 1 TABLET BY MOUTH EVERY DAY  Qty: 30 tablet, Refills: 5    Associated Diagnoses: Essential hypertension      metFORMIN (GLUCOPHAGE) 1000 MG tablet TAKE 1 TABLET BY MOUTH TWICE A DAY WITH MEALS  Qty: 120 tablet, Refills: 2    Associated Diagnoses: Uncontrolled type 2 diabetes mellitus with complication, with long-term current use of insulin (HCC)      metoprolol tartrate (LOPRESSOR) 25 mg tablet TAKE 1 TABLET BY MOUTH EVERY 12 HOURS  Qty: 180 tablet, Refills: 3    Associated Diagnoses: Essential hypertension      spironolactone (ALDACTONE) 25 mg tablet TAKE 1 TABLET BY MOUTH EVERY DAY  Qty: 30 tablet, Refills: 2    Associated Diagnoses: Essential hypertension      torsemide (DEMADEX) 20 mg tablet TAKE 1 TABLET BY MOUTH TWICE A DAY  Qty: 60 tablet, Refills: 2    Associated Diagnoses: Localized edema      warfarin (COUMADIN) 5 mg tablet TAKE 1-2 TABLETS DAILY OR AS DIRECTED BY MD Kevin De La Rosa: 60 tablet, Refills: 3    Associated Diagnoses: Chronic atrial fibrillation (HCC)           No discharge procedures on file      ED Provider  Electronically Signed by           Missy Moreno PA-C  07/25/19 0350

## 2019-07-25 NOTE — ED NOTES
Maureen George, patients friend is leaving  Please contact her for  or changes in condition   [address], MARKIE  07/25/19 5582

## 2019-07-26 LAB
ANION GAP SERPL CALCULATED.3IONS-SCNC: 8 MMOL/L (ref 4–13)
ATRIAL RATE: 44 BPM
BASOPHILS # BLD AUTO: 0.06 THOUSANDS/ΜL (ref 0–0.1)
BASOPHILS NFR BLD AUTO: 1 % (ref 0–1)
BUN SERPL-MCNC: 36 MG/DL (ref 5–25)
CALCIUM SERPL-MCNC: 8.5 MG/DL (ref 8.3–10.1)
CHLORIDE SERPL-SCNC: 104 MMOL/L (ref 100–108)
CO2 SERPL-SCNC: 26 MMOL/L (ref 21–32)
CREAT SERPL-MCNC: 1.24 MG/DL (ref 0.6–1.3)
EOSINOPHIL # BLD AUTO: 0.21 THOUSAND/ΜL (ref 0–0.61)
EOSINOPHIL NFR BLD AUTO: 3 % (ref 0–6)
ERYTHROCYTE [DISTWIDTH] IN BLOOD BY AUTOMATED COUNT: 15.8 % (ref 11.6–15.1)
GFR SERPL CREATININE-BSD FRML MDRD: 60 ML/MIN/1.73SQ M
GLUCOSE SERPL-MCNC: 102 MG/DL (ref 65–140)
GLUCOSE SERPL-MCNC: 105 MG/DL (ref 65–140)
GLUCOSE SERPL-MCNC: 123 MG/DL (ref 65–140)
GLUCOSE SERPL-MCNC: 151 MG/DL (ref 65–140)
GLUCOSE SERPL-MCNC: 64 MG/DL (ref 65–140)
HCT VFR BLD AUTO: 32.3 % (ref 36.5–49.3)
HGB BLD-MCNC: 9.7 G/DL (ref 12–17)
IMM GRANULOCYTES # BLD AUTO: 0.07 THOUSAND/UL (ref 0–0.2)
IMM GRANULOCYTES NFR BLD AUTO: 1 % (ref 0–2)
LYMPHOCYTES # BLD AUTO: 1.04 THOUSANDS/ΜL (ref 0.6–4.47)
LYMPHOCYTES NFR BLD AUTO: 13 % (ref 14–44)
MCH RBC QN AUTO: 27.6 PG (ref 26.8–34.3)
MCHC RBC AUTO-ENTMCNC: 30 G/DL (ref 31.4–37.4)
MCV RBC AUTO: 92 FL (ref 82–98)
MONOCYTES # BLD AUTO: 0.76 THOUSAND/ΜL (ref 0.17–1.22)
MONOCYTES NFR BLD AUTO: 9 % (ref 4–12)
NEUTROPHILS # BLD AUTO: 5.92 THOUSANDS/ΜL (ref 1.85–7.62)
NEUTS SEG NFR BLD AUTO: 73 % (ref 43–75)
NRBC BLD AUTO-RTO: 0 /100 WBCS
PLATELET # BLD AUTO: 301 THOUSANDS/UL (ref 149–390)
PMV BLD AUTO: 9.8 FL (ref 8.9–12.7)
POTASSIUM SERPL-SCNC: 5 MMOL/L (ref 3.5–5.3)
QRS AXIS: 63 DEGREES
QRSD INTERVAL: 108 MS
QT INTERVAL: 384 MS
QTC INTERVAL: 453 MS
RBC # BLD AUTO: 3.52 MILLION/UL (ref 3.88–5.62)
SODIUM SERPL-SCNC: 138 MMOL/L (ref 136–145)
T WAVE AXIS: 44 DEGREES
VENTRICULAR RATE: 84 BPM
WBC # BLD AUTO: 8.06 THOUSAND/UL (ref 4.31–10.16)

## 2019-07-26 PROCEDURE — 85025 COMPLETE CBC W/AUTO DIFF WBC: CPT | Performed by: HOSPITALIST

## 2019-07-26 PROCEDURE — 94640 AIRWAY INHALATION TREATMENT: CPT

## 2019-07-26 PROCEDURE — 99233 SBSQ HOSP IP/OBS HIGH 50: CPT | Performed by: HOSPITALIST

## 2019-07-26 PROCEDURE — 93010 ELECTROCARDIOGRAM REPORT: CPT | Performed by: INTERNAL MEDICINE

## 2019-07-26 PROCEDURE — 82948 REAGENT STRIP/BLOOD GLUCOSE: CPT

## 2019-07-26 PROCEDURE — 80048 BASIC METABOLIC PNL TOTAL CA: CPT | Performed by: HOSPITALIST

## 2019-07-26 PROCEDURE — 94760 N-INVAS EAR/PLS OXIMETRY 1: CPT

## 2019-07-26 RX ADMIN — LOSARTAN POTASSIUM 50 MG: 50 TABLET, FILM COATED ORAL at 08:22

## 2019-07-26 RX ADMIN — CEFEPIME HYDROCHLORIDE 2000 MG: 2 INJECTION, SOLUTION INTRAVENOUS at 02:37

## 2019-07-26 RX ADMIN — SPIRONOLACTONE 25 MG: 25 TABLET ORAL at 08:22

## 2019-07-26 RX ADMIN — METFORMIN HYDROCHLORIDE 1000 MG: 500 TABLET ORAL at 08:21

## 2019-07-26 RX ADMIN — VANCOMYCIN HYDROCHLORIDE 2500 MG: 1 INJECTION, POWDER, LYOPHILIZED, FOR SOLUTION INTRAVENOUS at 03:32

## 2019-07-26 RX ADMIN — INSULIN LISPRO 30 UNITS: 100 INJECTION, SOLUTION INTRAVENOUS; SUBCUTANEOUS at 12:44

## 2019-07-26 RX ADMIN — INSULIN LISPRO 30 UNITS: 100 INJECTION, SOLUTION INTRAVENOUS; SUBCUTANEOUS at 08:29

## 2019-07-26 RX ADMIN — HEPARIN SODIUM 5000 UNITS: 5000 INJECTION INTRAVENOUS; SUBCUTANEOUS at 21:58

## 2019-07-26 RX ADMIN — FLUTICASONE FUROATE AND VILANTEROL TRIFENATATE 1 PUFF: 100; 25 POWDER RESPIRATORY (INHALATION) at 08:54

## 2019-07-26 RX ADMIN — TORSEMIDE 20 MG: 20 TABLET ORAL at 20:11

## 2019-07-26 RX ADMIN — VANCOMYCIN HYDROCHLORIDE 1500 MG: 5 INJECTION, POWDER, LYOPHILIZED, FOR SOLUTION INTRAVENOUS at 23:08

## 2019-07-26 RX ADMIN — ALLOPURINOL 300 MG: 300 TABLET ORAL at 08:21

## 2019-07-26 RX ADMIN — METOPROLOL TARTRATE 25 MG: 25 TABLET, FILM COATED ORAL at 08:22

## 2019-07-26 RX ADMIN — METOPROLOL TARTRATE 25 MG: 25 TABLET, FILM COATED ORAL at 20:11

## 2019-07-26 RX ADMIN — INSULIN LISPRO 2 UNITS: 100 INJECTION, SOLUTION INTRAVENOUS; SUBCUTANEOUS at 12:44

## 2019-07-26 RX ADMIN — ALBUTEROL SULFATE 2 PUFF: 90 AEROSOL, METERED RESPIRATORY (INHALATION) at 08:54

## 2019-07-26 RX ADMIN — VANCOMYCIN HYDROCHLORIDE 1500 MG: 5 INJECTION, POWDER, LYOPHILIZED, FOR SOLUTION INTRAVENOUS at 15:58

## 2019-07-26 RX ADMIN — METRONIDAZOLE 500 MG: 500 INJECTION, SOLUTION INTRAVENOUS at 15:59

## 2019-07-26 RX ADMIN — METFORMIN HYDROCHLORIDE 1000 MG: 500 TABLET ORAL at 16:44

## 2019-07-26 RX ADMIN — TORSEMIDE 20 MG: 20 TABLET ORAL at 08:22

## 2019-07-26 RX ADMIN — METRONIDAZOLE 500 MG: 500 INJECTION, SOLUTION INTRAVENOUS at 08:22

## 2019-07-26 RX ADMIN — HEPARIN SODIUM 5000 UNITS: 5000 INJECTION INTRAVENOUS; SUBCUTANEOUS at 05:49

## 2019-07-26 RX ADMIN — INSULIN LISPRO 30 UNITS: 100 INJECTION, SOLUTION INTRAVENOUS; SUBCUTANEOUS at 16:44

## 2019-07-26 RX ADMIN — ATORVASTATIN CALCIUM 20 MG: 20 TABLET, FILM COATED ORAL at 08:22

## 2019-07-26 RX ADMIN — CEFEPIME HYDROCHLORIDE 2000 MG: 2 INJECTION, SOLUTION INTRAVENOUS at 15:58

## 2019-07-26 RX ADMIN — HEPARIN SODIUM 5000 UNITS: 5000 INJECTION INTRAVENOUS; SUBCUTANEOUS at 16:43

## 2019-07-26 NOTE — PLAN OF CARE
Problem: Potential for Falls  Goal: Patient will remain free of falls  Description  INTERVENTIONS:  - Assess patient frequently for physical needs  -  Identify cognitive and physical deficits and behaviors that affect risk of falls    -  [place] fall precautions as indicated by assessment   - Educate patient/family on patient safety including physical limitations  - Instruct patient to call for assistance with activity based on assessment  - Modify environment to reduce risk of injury  - Consider OT/PT consult to assist with strengthening/mobility  Outcome: Progressing     Problem: Prexisting or High Potential for Compromised Skin Integrity  Goal: Skin integrity is maintained or improved  Description  INTERVENTIONS:  - Identify patients at risk for skin breakdown  - Assess and monitor skin integrity  - Assess and monitor nutrition and hydration status  - Monitor labs (i e  albumin)  - Assess for incontinence   - Turn and reposition patient  - Assist with mobility/ambulation  - Relieve pressure over bony prominences  - Avoid friction and shearing  - Provide appropriate hygiene as needed including keeping skin clean and dry  - Evaluate need for skin moisturizer/barrier cream  - Collaborate with interdisciplinary team (i e  Nutrition, Rehabilitation, etc )   - Patient/family teaching  Outcome: Progressing     Problem: SKIN/TISSUE INTEGRITY - ADULT  Goal: Skin integrity remains intact  Description  INTERVENTIONS  - Identify patients at risk for skin breakdown  - Assess and monitor skin integrity  - Assess and monitor nutrition and hydration status  - Monitor labs (i e  albumin)  - Assess for incontinence   - Turn and reposition patient  - Assist with mobility/ambulation  - Relieve pressure over bony prominences  - Avoid friction and shearing  - Provide appropriate hygiene as needed including keeping skin clean and dry  - Evaluate need for skin moisturizer/barrier cream  - Collaborate with interdisciplinary team (i e  Nutrition, Rehabilitation, etc )   - Patient/family teaching  Outcome: Progressing  Goal: Incision(s), wounds(s) or drain site(s) healing without S/S of infection  Description  INTERVENTIONS  - Assess and document risk factors for skin impairment   - Assess and document dressing, incision, wound bed, drain sites and surrounding tissue  - Initiate Nutrition services consult and/or wound management as needed  Outcome: Progressing     Problem: MUSCULOSKELETAL - ADULT  Goal: Maintain or return mobility to safest level of function  Description  INTERVENTIONS:  - Assess patient's ability to carry out ADLs; assess patient's baseline for ADL function and identify physical deficits which impact ability to perform ADLs (bathing, care of mouth/teeth, toileting, grooming, dressing, etc )  - Assess/evaluate cause of self-care deficits   - Assess range of motion  - Assess patient's mobility; develop plan if impaired  - Assess patient's need for assistive devices and provide as appropriate  - Encourage maximum independence but intervene and supervise when necessary  - Involve family in performance of ADLs  - Assess for home care needs following discharge   - Request OT consult to assist with ADL evaluation and planning for discharge  - Provide patient education as appropriate  Outcome: Progressing     Problem: PAIN - ADULT  Goal: Verbalizes/displays adequate comfort level or baseline comfort level  Description  Interventions:  - Encourage patient to monitor pain and request assistance  - Assess pain using appropriate pain scale  - Administer analgesics based on type and severity of pain and evaluate response  - Implement non-pharmacological measures as appropriate and evaluate response  - Consider cultural and social influences on pain and pain management  - Notify physician/advanced practitioner if interventions unsuccessful or patient reports new pain  Outcome: Progressing     Problem: INFECTION - ADULT  Goal: Absence or prevention of progression during hospitalization  Description  INTERVENTIONS:  - Assess and monitor for signs and symptoms of infection  - Monitor lab/diagnostic results  - Monitor all insertion sites, i e  indwelling lines, tubes, and drains  - Monitor endotracheal (as able) and nasal secretions for changes in amount and color  - [place] appropriate cooling/warming therapies per order  - Administer medications as ordered  - Instruct and encourage patient and family to use good hand hygiene technique  - Identify and instruct in appropriate isolation precautions for identified infection/condition  Outcome: Progressing     Problem: SAFETY ADULT  Goal: Patient will remain free of falls  Description  INTERVENTIONS:  - Assess patient frequently for physical needs  -  Identify cognitive and physical deficits and behaviors that affect risk of falls    -  [place] fall precautions as indicated by assessment   - Educate patient/family on patient safety including physical limitations  - Instruct patient to call for assistance with activity based on assessment  - Modify environment to reduce risk of injury  - Consider OT/PT consult to assist with strengthening/mobility  Outcome: Progressing     Problem: DISCHARGE PLANNING  Goal: Discharge to home or other facility with appropriate resources  Description  INTERVENTIONS:  - Identify barriers to discharge w/patient and caregiver  - Arrange for needed discharge resources and transportation as appropriate  - Identify discharge learning needs (meds, wound care, etc )  - Arrange for interpretive services to assist at discharge as needed  - Refer to Case Management Department for coordinating discharge planning if the patient needs post-hospital services based on physician/advanced practitioner order or complex needs related to functional status, cognitive ability, or social support system  Outcome: Progressing     Problem: Knowledge Deficit  Goal: Patient/family/caregiver demonstrates understanding of disease process, treatment plan, medications, and discharge instructions  Description  Complete learning assessment and assess knowledge base  Interventions:  - Provide teaching at level of understanding  - Provide teaching via preferred learning methods  Outcome: Progressing     Problem: Nutrition/Hydration-ADULT  Goal: Nutrient/Hydration intake appropriate for improving, restoring or maintaining nutritional needs  Description  Monitor and assess patient's nutrition/hydration status for malnutrition (ex- brittle hair, bruises, dry skin, pale skin and conjunctiva, muscle wasting, smooth red tongue, and disorientation)  Collaborate with interdisciplinary team and initiate plan and interventions as ordered  Monitor patient's weight and dietary intake as ordered or per policy  Utilize nutrition screening tool and intervene per policy  Determine patient's food preferences and provide high-protein, high-caloric foods as appropriate       INTERVENTIONS:  - Monitor oral intake, urinary output, labs, and treatment plans  - Assess nutrition and hydration status and recommend course of action  - Evaluate amount of meals eaten  - Assist patient with eating if necessary   - Allow adequate time for meals  - Recommend/ encourage appropriate diets, oral nutritional supplements, and vitamin/mineral supplements  - Order, calculate, and assess calorie counts as needed  - Recommend, monitor, and adjust tube feedings and TPN/PPN based on assessed needs  - Assess need for intravenous fluids  - Provide specific nutrition/hydration education as appropriate  - Include patient/family/caregiver in decisions related to nutrition  Outcome: Progressing

## 2019-07-26 NOTE — UTILIZATION REVIEW
Initial Clinical Review    Admission: Date/Time/Statement: 7/25/19 @ 1452     Orders Placed This Encounter   Procedures    Inpatient Admission (expected length of stay for this patient Order details is greater than two midnights)     Standing Status:   Standing     Number of Occurrences:   1     Order Specific Question:   Admitting Physician     Answer:   Kaylin Leonard [[id number]]     Order Specific Question:   Level of Care     Answer:   Med Surg [[id number]]     Order Specific Question:   Estimated length of stay     Answer:   More than 2 Midnights     Order Specific Question:   Certification     Answer:   I certify that inpatient services are medically necessary for this patient for a duration of greater than two midnights  See H&P and MD Progress Notes for additional information about the patient's course of treatment  ED Arrival Information     Expected Arrival Acuity Means of Arrival Escorted By Service Admission Type    - 7/25/2019 12:54 Urgent Ambulance [facility]) General Medicine Urgent    Arrival Complaint    bilateral leg infections        Chief Complaint   Patient presents with    Wound Infection     pt presents to ED via EMS from home c/o bilateral severe wounds that are draining and malodoros  Pt states they are 9/10 pain  Pt also states he is severely SOB upon any exertion to the point where he is unable to walk to the bathroom      Assessment/Plan:    79 y o  male, to ED via EMS from home,   Admitted inpt status to [facility] of care,  For treatment of leg wounds and dyspnea  Pt w/h/o b/l leg wounds (due to chronic lymphedema and venous stasis dermatitis)  that have been worsening for months,  but he has not been to the wound clinic or been seen by his PCP  Both legs are painful, have open draining areas, and are malodorous  He has also been out of his Advair inhaler and complains of SOB and dyspnea     Will admit for IVAB therapy, wound care and podiatry consults,  tight control of DM w/accucks & sliding scale insulin  For now will continue/resume  home meds for asthma and chronic diastolic CHF       4/70  Internal medicine  RLE w/chronic venous stasis dermatitis, less erythematous than yesterday, 1 to 2+ lymphedema  LLE w/chronic venous stasis dermatitis and 3-4+ lymphedema  Patient has a blister wound on the plantar aspect of his left foot  On the anterior aspect of the left ankle joint there is an ulcer with black and/necrotic tissue and no discharge      ED Triage Vitals [07/25/19 1255]   Temperature Pulse Respirations Blood Pressure SpO2   98 6 °F (37 °C) 79 20 121/57 96 %      Temp Source Heart Rate Source Patient Position - Orthostatic VS BP Location FiO2 (%)   Tympanic Monitor Sitting Right arm --      Pain Score       8        Wt Readings from Last 1 Encounters:   07/26/19 (!) 171 kg (375 lb 14 2 oz)       Wt Readings from Last 3 Encounters:   07/25/19 (!) 175 kg (385 lb 12 9 oz)   07/02/19 (!) 175 kg (386 lb)   06/10/19 (!) 171 kg (378 lb)     Additional Vital Signs:   07/25/19 2231  98 9 °F   67  18  127/57  92 %   07/25/19 1921  98 2 °F   74  20  144/80  94 %   07/25/19 1835  --  74  24  144/63  97 %   07/25/19 1415  --  85  24  142/65  rm air        Pertinent Labs/Diagnostic Test Results:   7/25  Left foot xray  -  Worsening Charcot joint at midfoot,  Worsening soft tissue swellign; Shallow ulcer plantar aspect of midfoot  COUND NOT confirm and abscess or osteomyelitis       Results from last 7 days   Lab Units 07/26/19  0507 07/25/19  1345   WBC Thousand/uL 8 06 9 15   HEMOGLOBIN g/dL 9 7* 10 3*   HEMATOCRIT % 32 3* 34 2*   PLATELETS Thousands/uL 301 334   NEUTROS ABS Thousands/µL 5 92 7 06         Results from last 7 days   Lab Units 07/26/19  0507 07/25/19  1345   SODIUM mmol/L 138 137   POTASSIUM mmol/L 5 0 5 4*   CHLORIDE mmol/L 104 101   CO2 mmol/L 26 30   ANION GAP mmol/L 8 6   BUN mg/dL 36* 36*   CREATININE mg/dL 1 24 1 32*   EGFR ml/min/1 73sq m 60 56   CALCIUM mg/dL 8 5 8 9     Results from last 7 days   Lab Units 07/25/19  1345   AST U/L 19   ALT U/L 24   ALK PHOS U/L 247*   TOTAL PROTEIN g/dL 8 4*   ALBUMIN g/dL 2 7*   TOTAL BILIRUBIN mg/dL 0 80     Results from last 7 days   Lab Units 07/26/19  1106 07/26/19  0800 07/25/19  2101 07/25/19 2004 07/25/19  1348   POC GLUCOSE mg/dl 151* 123 191* 142* 149*     Results from last 7 days   Lab Units 07/26/19  0507 07/25/19  1345   GLUCOSE RANDOM mg/dL 102 141*         Results from last 7 days   Lab Units 07/23/19  1406   PROTIME seconds 21 3*   INR  1 88*       Results from last 7 days   Lab Units 07/25/19  1345   SED RATE mm/hour 134*         Results from last 7 days   Lab Units 07/25/19  1351   GRAM STAIN RESULT  No polys seen*  4+ Gram positive rods*  3+ Gram negative rods*  3+ Gram positive cocci in pairs*  No polys seen*  1+ Gram positive cocci in pairs*  1+ Gram positive rods*   WOUND CULTURE  3+ Growth of Non lactose fermenting gram negative mina*               ED Treatment:   Medication Administration from 07/25/2019 1254 to 07/25/2019 1927       Date/Time Order Dose Route     07/25/2019 1353 sodium chloride 0 9 % bolus 1,000 mL 1,000 mL Intravenous     07/25/2019 1526 cefepime (MAXIPIME) 2 g/50 mL dextrose IVPB 2,000 mg Intravenous     07/25/2019 1800 vancomycin (VANCOCIN) 2,750 mg in sodium chloride 0 9 % 500 mL IVPB 2,750 mg Intravenous     07/25/2019 1629 metroNIDAZOLE (FLAGYL) IVPB (premix) 500 mg 500 mg Intravenous        Past Medical History:   Diagnosis Date    CHF [facility]congestive heart failure) ([facility])     COPD (chronic obstructive pulmonary disease) ([facility])     Decubitus ulcer of heel     LAST ASSESSED [id number]    Diabetes mellitus ([facility] [address] )     History of varicose veins     Hypertension     Intermittent claudication ([facility])     Neuropathy     Seasonal allergies Present on Admission:   Chronic atrial fibrillation (HCC)   Chronic diastolic congestive heart failure (HCC)   Chronic venous stasis dermatitis of both lower extremities   Type 2 diabetes mellitus with left diabetic foot ulcer (HCC)   Gout   Morbid obesity ([facility] )   Essential hypertension   Moderate persistent asthma without complication      Admitting Diagnosis: Wound infection [T14  8XXA, L08 9]  Insulin dependent diabetes mellitus (HCC) [E11 9, Z79 4]  Left leg cellulitis [Y17 371]  Skin ulcer of left foot with necrosis of muscle ([facility] ) [L97 523]  Age/Sex: 79 y o  male     Admission Orders:  Consult ID, podiatry;  PT/OT eval and treat;  accucks qid w/SSI ;      Current Facility-Administered Medications:  acetaminophen 650 mg Oral Q6H PRN   albuterol 2 puff Inhalation Q6H PRN   allopurinol 300 mg Oral Daily   atorvastatin 20 mg Oral Daily   cefepime 2,000 mg Intravenous Q12H   fluticasone-vilanterol 1 puff Inhalation Daily   heparin (porcine) 5,000 Units Subcutaneous Q8H [facility]   insulin lispro 2-12 Units Subcutaneous TID AC   insulin lispro 30 Units Subcutaneous TID With Meals   losartan 50 mg Oral Daily   metFORMIN 1,000 mg Oral BID With Meals   metoprolol tartrate 25 mg Oral Q12H [facility]   metroNIDAZOLE 500 mg Intravenous Q8H   spironolactone 25 mg Oral Daily   torsemide 20 mg Oral BID   vancomycin 1,500 mg Intravenous Q8H               Network Utilization Review Department  Phone: [phone]; Fax [phone]  [email or web address]  ATTENTION: Please call with any questions or concerns to [phone]  and carefully listen to the prompts so that you are directed to the right person  Send all requests for admission clinical reviews, approved or denied determinations and any other requests to fax [phone]   All voicemails are confidential

## 2019-07-26 NOTE — SOCIAL WORK
LOS 1 DAYS  GMLOS 2 9  PATIENT IS NOT A BUNDLE  PATIENT IS NOT A READMISSION  CM met with patient and friend at bedside and reviewed information  CM was informed that patient was living in [place] [address] in a 3 story home  Patient denied steps to get to the first floor and reported that he has typical 13 steps to 2nd floor  Patient reported to CM that he had a walker in the car but he was not using it  Patient denied current VNA but reported Hx of [facility]  Patient reported Hx of [facility]  Patient reported that son was POA  Patient drove prior to admission  Patient does not work  Patient is aware he may need rehab pending recommendations and determined that patient would prefer referral to [facility] place referral to [facility]  CM reviewed d/c planning process including the following: identifying help at home, patient preference for d/c planning needs, availability of treatment team to discuss questions or concerns patient and/or family may have regarding understanding medications and recognizing signs and symptoms once discharged  CM also encouraged patient to follow up with all recommended appointments after discharge  Patient advised of importance for patient and family to participate in managing patients medical well being  Referrals placed to [facility] for possible admission to rehab pending PT and OT recommendations  CM department will follow through discharge

## 2019-07-26 NOTE — PLAN OF CARE
Problem: Potential for Falls  Goal: Patient will remain free of falls  Description  INTERVENTIONS:  - Assess patient frequently for physical needs  -  Identify cognitive and physical deficits and behaviors that affect risk of falls    -  [place] fall precautions as indicated by assessment   - Educate patient/family on patient safety including physical limitations  - Instruct patient to call for assistance with activity based on assessment  - Modify environment to reduce risk of injury  - Consider OT/PT consult to assist with strengthening/mobility  Outcome: Progressing

## 2019-07-26 NOTE — PROGRESS NOTES
Vancomycin IV Pharmacy-to-Dose Consultation    Andrew Mendoza is a 79 y o  male who is currently receiving Vancomycin 2500mg IV Q12H via management by the Pharmacy Consult service  The indication is skin & soft tissue infection with a goal Vtr of 15-20  Assessment/Plan:  The patient was reviewed  Renal function has improved as seen with a decrease in Scr from 1 32 to 1 24  There are no signs or symptoms of nephrotoxicity and/or infusion reactions documented in the chart  Despite the improved clearance, will keep current regimen as ordered considering patient is receiving a fairly high dose of Vancomycin already  Will wait for Vtr results when patient is at steady-state to determine any dose adjustments  In summary, continue current regimen of Vancomycin 2500mg IV Q12H (day # 2) for now, with a plan for trough to be drawn as previously ordered on 7/27/19 @1530  Pharmacy will continue to follow the patients culture results and clinical progress daily      Tom Mccann, Pharmacist

## 2019-07-26 NOTE — MALNUTRITION/BMI
This medical record reflects one or more clinical indicators suggestive of malnutrition and/or morbid obesity  BMI Findings:  BMI Classifications: Morbid Obesity 45-49 9     Body mass index is 46 98 kg/m²  Treat with CCD2 diet  See Nutrition note dated 07/26/19  for additional details  Completed nutrition assessment is viewable in the nutrition documentation

## 2019-07-26 NOTE — PROGRESS NOTES
Vancomycin Assessment    Valerie Contreras is a 79 y o  male who is currently receiving vancomycin 2500 mg IV Q12h  for skin-soft tissue infection     Relevant clinical data and objective history reviewed:  Creatinine   Date Value Ref Range Status   07/26/2019 1 24 0 60 - 1 30 mg/dL Final     Comment:     Standardized to IDMS reference method   07/25/2019 1 32 (H) 0 60 - 1 30 mg/dL Final     Comment:     Standardized to IDMS reference method   03/18/2019 1 31 (H) 0 60 - 1 30 mg/dL Final     Comment:     Standardized to IDMS reference method   01/15/2018 0 99 0 70 - 1 25 mg/dL Final     Comment:     Result Comment: For patients >52years of age, the reference limit  for Creatinine is approximately 13% higher for people  identified as -American      05/25/2017 1 31 (H) 0 70 - 1 25 mg/dL Final     Comment:     Result Comment: For patients >52years of age, the reference limit  for Creatinine is approximately 13% higher for people  identified as -American      12/09/2016 1 18 0 70 - 1 25 mg/dL Final     Comment:     Result Comment: For patients >52years of age, the reference limit  for Creatinine is approximately 13% higher for people  identified as -American  /60 (BP Location: Left arm)   Pulse 75   Temp 98 2 °F (36 8 °C) (Oral)   Resp 18   Ht 6' 3" (1 905 m)   Wt (!) 171 kg (375 lb 14 2 oz)   SpO2 91%   BMI 46 98 kg/m²   I/O last 3 completed shifts:   In: 100 [IV Piggyback:100]  Out: 2250 [Urine:2250]  Lab Results   Component Value Date/Time    BUN 36 (H) 07/26/2019 05:07 AM    BUN 27 (H) 01/23/2018 02:29 PM    WBC 8 06 07/26/2019 05:07 AM    WBC 5 16 12/23/2015 06:00 AM    HGB 9 7 (L) 07/26/2019 05:07 AM    HGB 10 6 (L) 12/23/2015 06:00 AM    HCT 32 3 (L) 07/26/2019 05:07 AM    HCT 34 5 (L) 12/23/2015 06:00 AM    MCV 92 07/26/2019 05:07 AM    MCV 91 12/23/2015 06:00 AM     07/26/2019 05:07 AM     12/23/2015 06:00 AM     Temp Readings from Last 3 Encounters: 07/26/19 98 2 °F (36 8 °C) (Oral)   05/07/19 97 9 °F (36 6 °C)   04/26/19 97 5 °F (36 4 °C) (Temporal)     Vancomycin Days of Therapy: 2    Assessment/Plan  The patient is currently on vancomycin utilizing scheduled dosing  The patient is receiving 2500 mg IV Q12h  and after clinical evaluation will be changed to 1500mg IV every 8 hours with goal trough of 15-20 (appropriate for most indications) ; Pharmacy will continue to follow closely for s/sx of nephrotoxicity, infusion reactions and appropriateness of therapy  BMP and CBC will be ordered per protocol  Plan for trough as patient approaches steady state, prior to the 4th  dose at approximately 1500 on 07/27/2019  Pharmacy will continue to follow the patients culture results and clinical progress daily      Moreno Lancaster, Pharmacist

## 2019-07-26 NOTE — ASSESSMENT & PLAN NOTE
Wt Readings from Last 3 Encounters:   07/26/19 (!) 171 kg (375 lb 14 2 oz)   07/02/19 (!) 175 kg (386 lb)   06/10/19 (!) 171 kg (378 lb)     -continue beta-blockers/Aldactone/Demadex

## 2019-07-26 NOTE — ASSESSMENT & PLAN NOTE
Lab Results   Component Value Date    HGBA1C 9 4 (H) 12/27/2018       Recent Labs     07/25/19  2004 07/25/19  2101 07/26/19  0800 07/26/19  1106   POCGLU 142* 191* 123 151*       Blood Sugar Average: Last 72 hrs:  (P) 151 2     -Cont IV Vanco/Cefepime/Flagyl  -cont Novolog/Metformin  -Podiatry/ID c/s  -may need OR

## 2019-07-26 NOTE — PROGRESS NOTES
Progress Note - Kaden Cano 1950, 79 y o  male MRN: [id number]    Unit/Bed#: [address] [place] [address] Encounter: [id number]    Primary Care Provider: Vesta Mireles MD   Date and time admitted to hospital: 7/25/2019  1:04 PM        Moderate persistent asthma without complication  Assessment & Plan  -cont albuterol/Advair    Chronic diastolic congestive heart failure [facility])  Assessment & Plan  Wt Readings from Last 3 Encounters:   07/26/19 (!) 171 kg (375 lb 14 2 oz)   07/02/19 (!) 175 kg (386 lb)   06/10/19 (!) 171 kg (378 lb)     -continue beta-blockers/Aldactone/Demadex        Morbid obesity ([facility] [address] )  Assessment & Plan  -encourage weight loss    Chronic atrial fibrillation ([facility] [address] )  Assessment & Plan  -hold coumadin for possible OR  -cont BB    Essential hypertension  Assessment & Plan  -continue beta-blocker/Aldactone/Cozaar    Chronic venous stasis dermatitis of both lower extremities  Assessment & Plan  -chronic  -podiatry/wound care c/s    Gout  Assessment & Plan  -continue allopurinol    * Type 2 diabetes mellitus with left diabetic foot ulcer ([facility] [address] )  Assessment & Plan  Lab Results   Component Value Date    HGBA1C 9 4 (H) 12/27/2018       Recent Labs     07/25/19  2004 07/25/19  2101 07/26/19  0800 07/26/19  1106   POCGLU 142* 191* 123 151*       Blood Sugar Average: Last 72 hrs:  (P) 151 2     -Cont IV Vanco/Cefepime/Flagyl  -cont Novolog/Metformin  -Podiatry/ID c/s  -may need OR      VTE Pharmacologic Prophylaxis:   Pharmacologic: Heparin  Mechanical VTE Prophylaxis in Place: No    Patient Centered Rounds: I have performed bedside rounds with nursing staff today  Education and Discussions with Family / Patient:  Patient's significant other bedside    Time Spent for Care: 20 minutes  More than 50% of total time spent on counseling and coordination of care as described above      Current Length of Stay: 1 day(s)    Current Patient Status: Inpatient   Certification Statement: The patient will continue to require additional inpatient hospital stay due to Cellulitis and ulcer of the left leg    Discharge Plan:  2-3 days    Code Status: Level 1 - Full Code      Subjective:   Patient without new complaints  Objective:     Vitals:   Temp (24hrs), Av 3 °F (36 8 °C), Min:97 9 °F (36 6 °C), Max:98 9 °F (37 2 °C)    Temp:  [97 9 °F (36 6 °C)-98 9 °F (37 2 °C)] 97 9 °F (36 6 °C)  HR:  [67-78] 77  Resp:  [18-24] 18  BP: (127-144)/(57-80) 138/61  SpO2:  [92 %-97 %] 95 %  Body mass index is 46 98 kg/m²  Input and Output Summary (last 24 hours): Intake/Output Summary (Last 24 hours) at 2019 1437  Last data filed at 2019 1300  Gross per 24 hour   Intake 100 ml   Output 3100 ml   Net -3000 ml       Physical Exam:     Physical Exam   Constitutional: He is oriented to person, place, and time  He appears well-developed and well-nourished  HENT:   Head: Normocephalic and atraumatic  Eyes: Pupils are equal, round, and reactive to light  EOM are normal    Neck: Normal range of motion  Neck supple  Cardiovascular: Normal rate, regular rhythm and normal heart sounds  Pulmonary/Chest: Effort normal and breath sounds normal    Abdominal: Soft  Bowel sounds are normal    Neurological: He is alert and oriented to person, place, and time  No cranial nerve deficit  Skin:   Right lower extremity with chronic venous stasis dermatitis, less erythematous than yesterday, 1 to 2+ lymphedema  Left lower extremity with chronic venous stasis dermatitis and 3-4+ lymphedema  Patient has a blister wound on the plantar aspect of his left foot  On the anterior aspect of the left ankle joint there is an ulcer with black and/necrotic tissue and no discharge  Psychiatric: He has a normal mood and affect  Vitals reviewed          Additional Data:     Labs:    Results from last 7 days   Lab Units 19  0507   WBC Thousand/uL 8 06   HEMOGLOBIN g/dL 9 7*   HEMATOCRIT % 32 3*   PLATELETS Thousands/uL 301   NEUTROS PCT % 73   LYMPHS PCT % 13*   MONOS PCT % 9   EOS PCT % 3     Results from last 7 days   Lab Units 07/26/19  0507 07/25/19  1345   SODIUM mmol/L 138 137   POTASSIUM mmol/L 5 0 5 4*   CHLORIDE mmol/L 104 101   CO2 mmol/L 26 30   BUN mg/dL 36* 36*   CREATININE mg/dL 1 24 1 32*   ANION GAP mmol/L 8 6   CALCIUM mg/dL 8 5 8 9   ALBUMIN g/dL  --  2 7*   TOTAL BILIRUBIN mg/dL  --  0 80   ALK PHOS U/L  --  247*   ALT U/L  --  24   AST U/L  --  19   GLUCOSE RANDOM mg/dL 102 141*     Results from last 7 days   Lab Units 07/23/19  1406   INR  1 88*     Results from last 7 days   Lab Units 07/26/19  1106 07/26/19  0800 07/25/19  2101 07/25/19 2004 07/25/19  1348   POC GLUCOSE mg/dl 151* 123 191* 142* 149*                   * I Have Reviewed All Lab Data Listed Above  * Additional Pertinent Lab Tests Reviewed:  Caesar [facility] Admission Reviewed    Recent Cultures (last 7 days):     Results from last 7 days   Lab Units 07/25/19  1351   GRAM STAIN RESULT  No polys seen*  4+ Gram positive rods*  3+ Gram negative rods*  3+ Gram positive cocci in pairs*  No polys seen*  1+ Gram positive cocci in pairs*  1+ Gram positive rods*   WOUND CULTURE  3+ Growth of Non lactose fermenting gram negative mina*       Last 24 Hours Medication List:     Current Facility-Administered Medications:  acetaminophen 650 mg Oral Q6H PRN Dario Thornton MD    albuterol 2 puff Inhalation Q6H PRN Dario Thornton MD    allopurinol 300 mg Oral Daily Dario Thornton MD    atorvastatin 20 mg Oral Daily Dario Thornton MD    cefepime 2,000 mg Intravenous Q12H Dario Thornton MD Last Rate: 2,000 mg (07/26/19 0237)   fluticasone-vilanterol 1 puff Inhalation Daily Dario Thornton MD    heparin (porcine) 5,000 Units Subcutaneous [facility] Dario Thornton MD    insulin lispro 2-12 Units Subcutaneous TID [facility] Dario Thornton MD    insulin lispro 30 Units Subcutaneous TID With Meals Dario Thornton MD    losartan 50 mg Oral Daily Dario Thornton MD    metFORMIN 1,000 mg Oral BID With Meals Magan Hinojosa MD    metoprolol tartrate 25 mg Oral Q12H [address] [place] MD Dinora    metroNIDAZOLE 500 mg Intravenous Q8H Magan Hinojosa MD Last Rate: 500 mg (07/26/19 9783)   spironolactone 25 mg Oral Daily Magan Hinojosa MD    torsemide 20 mg Oral BID Magan Hinojosa MD    vancomycin 20 mg/kg (Adjusted) Intravenous Q12H Magan Hinojosa MD Last Rate: 2,500 mg (07/26/19 6309)        Today, Patient Was Seen By: Magan Hinojosa MD    ** Please Note: Dictation voice to text software may have been used in the creation of this document   **

## 2019-07-27 LAB
ANION GAP SERPL CALCULATED.3IONS-SCNC: 9 MMOL/L (ref 4–13)
APTT PPP: 54 SECONDS (ref 23–37)
BACTERIA WND AEROBE CULT: ABNORMAL
BASOPHILS # BLD AUTO: 0.06 THOUSANDS/ΜL (ref 0–0.1)
BASOPHILS NFR BLD AUTO: 1 % (ref 0–1)
BUN SERPL-MCNC: 38 MG/DL (ref 5–25)
CALCIUM SERPL-MCNC: 8.8 MG/DL (ref 8.3–10.1)
CHLORIDE SERPL-SCNC: 98 MMOL/L (ref 100–108)
CO2 SERPL-SCNC: 27 MMOL/L (ref 21–32)
CREAT SERPL-MCNC: 1.42 MG/DL (ref 0.6–1.3)
EOSINOPHIL # BLD AUTO: 0.24 THOUSAND/ΜL (ref 0–0.61)
EOSINOPHIL NFR BLD AUTO: 3 % (ref 0–6)
ERYTHROCYTE [DISTWIDTH] IN BLOOD BY AUTOMATED COUNT: 15.7 % (ref 11.6–15.1)
GFR SERPL CREATININE-BSD FRML MDRD: 51 ML/MIN/1.73SQ M
GLUCOSE SERPL-MCNC: 120 MG/DL (ref 65–140)
GLUCOSE SERPL-MCNC: 122 MG/DL (ref 65–140)
GLUCOSE SERPL-MCNC: 136 MG/DL (ref 65–140)
GLUCOSE SERPL-MCNC: 191 MG/DL (ref 65–140)
GLUCOSE SERPL-MCNC: 255 MG/DL (ref 65–140)
GLUCOSE SERPL-MCNC: 65 MG/DL (ref 65–140)
GLUCOSE SERPL-MCNC: 65 MG/DL (ref 65–140)
GRAM STN SPEC: ABNORMAL
HCT VFR BLD AUTO: 35.7 % (ref 36.5–49.3)
HGB BLD-MCNC: 10.7 G/DL (ref 12–17)
IMM GRANULOCYTES # BLD AUTO: 0.08 THOUSAND/UL (ref 0–0.2)
IMM GRANULOCYTES NFR BLD AUTO: 1 % (ref 0–2)
INR PPP: 1.72 (ref 0.84–1.19)
LYMPHOCYTES # BLD AUTO: 1.01 THOUSANDS/ΜL (ref 0.6–4.47)
LYMPHOCYTES NFR BLD AUTO: 12 % (ref 14–44)
MCH RBC QN AUTO: 27.2 PG (ref 26.8–34.3)
MCHC RBC AUTO-ENTMCNC: 30 G/DL (ref 31.4–37.4)
MCV RBC AUTO: 91 FL (ref 82–98)
MONOCYTES # BLD AUTO: 0.64 THOUSAND/ΜL (ref 0.17–1.22)
MONOCYTES NFR BLD AUTO: 7 % (ref 4–12)
NEUTROPHILS # BLD AUTO: 6.57 THOUSANDS/ΜL (ref 1.85–7.62)
NEUTS SEG NFR BLD AUTO: 76 % (ref 43–75)
NRBC BLD AUTO-RTO: 0 /100 WBCS
PLATELET # BLD AUTO: 357 THOUSANDS/UL (ref 149–390)
PMV BLD AUTO: 9.8 FL (ref 8.9–12.7)
POTASSIUM SERPL-SCNC: 5 MMOL/L (ref 3.5–5.3)
PROTHROMBIN TIME: 19.8 SECONDS (ref 11.6–14.5)
RBC # BLD AUTO: 3.94 MILLION/UL (ref 3.88–5.62)
SODIUM SERPL-SCNC: 134 MMOL/L (ref 136–145)
VANCOMYCIN TROUGH SERPL-MCNC: 43.1 UG/ML (ref 10–20)
WBC # BLD AUTO: 8.6 THOUSAND/UL (ref 4.31–10.16)

## 2019-07-27 PROCEDURE — 85730 THROMBOPLASTIN TIME PARTIAL: CPT | Performed by: INTERNAL MEDICINE

## 2019-07-27 PROCEDURE — 80048 BASIC METABOLIC PNL TOTAL CA: CPT | Performed by: HOSPITALIST

## 2019-07-27 PROCEDURE — 85610 PROTHROMBIN TIME: CPT | Performed by: INTERNAL MEDICINE

## 2019-07-27 PROCEDURE — 85025 COMPLETE CBC W/AUTO DIFF WBC: CPT | Performed by: HOSPITALIST

## 2019-07-27 PROCEDURE — 82948 REAGENT STRIP/BLOOD GLUCOSE: CPT

## 2019-07-27 PROCEDURE — 80202 ASSAY OF VANCOMYCIN: CPT | Performed by: HOSPITALIST

## 2019-07-27 PROCEDURE — 76937 US GUIDE VASCULAR ACCESS: CPT | Performed by: PHYSICIAN ASSISTANT

## 2019-07-27 PROCEDURE — 02HV33Z INSERTION OF INFUSION DEVICE INTO SUPERIOR VENA CAVA, PERCUTANEOUS APPROACH: ICD-10-PCS | Performed by: INTERNAL MEDICINE

## 2019-07-27 PROCEDURE — 36569 INSJ PICC 5 YR+ W/O IMAGING: CPT | Performed by: PHYSICIAN ASSISTANT

## 2019-07-27 PROCEDURE — 94760 N-INVAS EAR/PLS OXIMETRY 1: CPT

## 2019-07-27 PROCEDURE — 94640 AIRWAY INHALATION TREATMENT: CPT

## 2019-07-27 PROCEDURE — 99223 1ST HOSP IP/OBS HIGH 75: CPT | Performed by: PODIATRIST

## 2019-07-27 PROCEDURE — 99232 SBSQ HOSP IP/OBS MODERATE 35: CPT | Performed by: INTERNAL MEDICINE

## 2019-07-27 RX ORDER — INSULIN GLARGINE 100 [IU]/ML
28 INJECTION, SOLUTION SUBCUTANEOUS
Status: DISCONTINUED | OUTPATIENT
Start: 2019-07-27 | End: 2019-07-30

## 2019-07-27 RX ORDER — HEPARIN SODIUM 1000 [USP'U]/ML
2000 INJECTION, SOLUTION INTRAVENOUS; SUBCUTANEOUS AS NEEDED
Status: DISCONTINUED | OUTPATIENT
Start: 2019-07-27 | End: 2019-07-31

## 2019-07-27 RX ORDER — HEPARIN SODIUM 1000 [USP'U]/ML
4000 INJECTION, SOLUTION INTRAVENOUS; SUBCUTANEOUS AS NEEDED
Status: DISCONTINUED | OUTPATIENT
Start: 2019-07-27 | End: 2019-07-31

## 2019-07-27 RX ORDER — DEXTROSE MONOHYDRATE 25 G/50ML
25 INJECTION, SOLUTION INTRAVENOUS ONCE
Status: COMPLETED | OUTPATIENT
Start: 2019-07-27 | End: 2019-07-27

## 2019-07-27 RX ORDER — HEPARIN SODIUM 10000 [USP'U]/100ML
3-20 INJECTION, SOLUTION INTRAVENOUS
Status: DISCONTINUED | OUTPATIENT
Start: 2019-07-27 | End: 2019-07-31

## 2019-07-27 RX ADMIN — METOPROLOL TARTRATE 25 MG: 25 TABLET, FILM COATED ORAL at 08:43

## 2019-07-27 RX ADMIN — CEFEPIME HYDROCHLORIDE 2000 MG: 2 INJECTION, SOLUTION INTRAVENOUS at 03:31

## 2019-07-27 RX ADMIN — SPIRONOLACTONE 25 MG: 25 TABLET ORAL at 08:43

## 2019-07-27 RX ADMIN — INSULIN LISPRO 30 UNITS: 100 INJECTION, SOLUTION INTRAVENOUS; SUBCUTANEOUS at 08:44

## 2019-07-27 RX ADMIN — INSULIN GLARGINE 28 UNITS: 100 INJECTION, SOLUTION SUBCUTANEOUS at 21:15

## 2019-07-27 RX ADMIN — FLUTICASONE FUROATE AND VILANTEROL TRIFENATATE 1 PUFF: 100; 25 POWDER RESPIRATORY (INHALATION) at 07:24

## 2019-07-27 RX ADMIN — METRONIDAZOLE 500 MG: 500 INJECTION, SOLUTION INTRAVENOUS at 08:43

## 2019-07-27 RX ADMIN — HEPARIN SODIUM AND DEXTROSE 11.1 UNITS/KG/HR: 10000; 5 INJECTION INTRAVENOUS at 12:19

## 2019-07-27 RX ADMIN — TORSEMIDE 20 MG: 20 TABLET ORAL at 08:43

## 2019-07-27 RX ADMIN — INSULIN LISPRO 30 UNITS: 100 INJECTION, SOLUTION INTRAVENOUS; SUBCUTANEOUS at 13:07

## 2019-07-27 RX ADMIN — METRONIDAZOLE 500 MG: 500 INJECTION, SOLUTION INTRAVENOUS at 17:22

## 2019-07-27 RX ADMIN — HEPARIN SODIUM 2000 UNITS: 1000 INJECTION, SOLUTION INTRAVENOUS; SUBCUTANEOUS at 20:54

## 2019-07-27 RX ADMIN — DEXTROSE 50 % IN WATER (D50W) INTRAVENOUS SYRINGE 25 ML: at 17:22

## 2019-07-27 RX ADMIN — LOSARTAN POTASSIUM 50 MG: 50 TABLET, FILM COATED ORAL at 08:43

## 2019-07-27 RX ADMIN — HEPARIN SODIUM 5000 UNITS: 5000 INJECTION INTRAVENOUS; SUBCUTANEOUS at 05:16

## 2019-07-27 RX ADMIN — TORSEMIDE 20 MG: 20 TABLET ORAL at 18:51

## 2019-07-27 RX ADMIN — METRONIDAZOLE 500 MG: 500 INJECTION, SOLUTION INTRAVENOUS at 00:08

## 2019-07-27 RX ADMIN — VANCOMYCIN HYDROCHLORIDE 1500 MG: 5 INJECTION, POWDER, LYOPHILIZED, FOR SOLUTION INTRAVENOUS at 08:55

## 2019-07-27 RX ADMIN — METFORMIN HYDROCHLORIDE 1000 MG: 500 TABLET ORAL at 08:43

## 2019-07-27 RX ADMIN — ATORVASTATIN CALCIUM 20 MG: 20 TABLET, FILM COATED ORAL at 08:43

## 2019-07-27 RX ADMIN — ALLOPURINOL 300 MG: 300 TABLET ORAL at 08:43

## 2019-07-27 RX ADMIN — CEFEPIME HYDROCHLORIDE 2000 MG: 2 INJECTION, SOLUTION INTRAVENOUS at 15:17

## 2019-07-27 NOTE — PROGRESS NOTES
Blood glucose 65  Patient drank 2 containers of orange juice and recheck was still 65  Attending provider notified  Hold Humalog 30 and SS with dinner  Half amp D50 ordered and administered  Will recheck blood sugar 15 minutes post administration and continue to monitor patient

## 2019-07-27 NOTE — ASSESSMENT & PLAN NOTE
Lab Results   Component Value Date    HGBA1C 9 4 (H) 12/27/2018       Recent Labs     07/26/19  1106 07/26/19  1627 07/26/19  2138 07/27/19  0749   POCGLU 151* 105 64* 136       Blood Sugar Average: Last 72 hrs:  (P) 132 625     · Continue IV Vanco/Cefepime/Flagyl  · Continue Novolog/Lantus/sliding scale coverage  · Discontinue metformin  · Podiatry, ID on board  · Acute Care surgery consult by Podiatry for possible amputation

## 2019-07-27 NOTE — PLAN OF CARE
Problem: Potential for Falls  Goal: Patient will remain free of falls  Description  INTERVENTIONS:  - Assess patient frequently for physical needs  -  Identify cognitive and physical deficits and behaviors that affect risk of falls    -  Lannon fall precautions as indicated by assessment   - Educate patient/family on patient safety including physical limitations  - Instruct patient to call for assistance with activity based on assessment  - Modify environment to reduce risk of injury  - Consider OT/PT consult to assist with strengthening/mobility  Outcome: Progressing

## 2019-07-27 NOTE — CONSULTS
Consult - Podiatry   Edwardo Borden 79 y o  male MRN: [id number]  Unit/Bed#: [address] [place] [address] Encounter: [id number]    Assessment/Plan     Assessment:  1  Gutiérrez IV Left foot Diabetic Foot Ulcer - significant tissue loss  2  Active / Acute Charcot Neuroarthropathy Left Foot  3  Diabetic Peripheral Neuropathy  4  Venous Stasis of Lower Extremities  5  Cellulitis     Plan:  1  I spent time discussing with the patient the treatment options for his Left lower extremity  I explained that salvage fo the foot at this point would require a long period of non-weightbearing on the Left foot, outpatient wound center visits, multiple debridements in the OR and/or as outpatient, imaging to rule out bone infection and abscess, etc  I also explained that in light of the large tissue loss, active Charcot, active infection and unwillingness to participate in outpatient wound care; an amputation (BKA/AKA) might be the best option at this point  He was somewhat non-committal on both options  I told him I am going to put in a general surgery consult for an amputation recommendation and also that if he wants another opinion on this he can discuss with general surgery or ask for another podiatric second opinion  2  I am not ordering an MRI as I do not feel it will change the course of this case  3  Local dressing orders will be written and the gen surg consult will be placed  4  I reviewed recent and historic plain xrays of the foot  5  I reviewed the H+P for this admission and Dr Divya Schofield' outpatient note from 6/10/19    History of Present Illness     HPI:  Edwardo Borden is a 79 y o  male who presents with worsening tissue loss and drainage on his Left foot  He was seen on 6/10/19 by Dr Divya Schofield at the office at which point he has some bruising of the heel and it was noted at that time that he sits all day at home with his heel on the floor although at that point the heel and plantar foot was not open   He already had some weeping from the venous stasis of his leg and it was noted he refused to regularly use his compression stockings  He was told to go to the wound center and he refused that recommendation and chose basically ignore the foot until he was admitted for this hospital encounter when he noted drainage and new wounds  He doesn't look at the bottom of his foot so really doesn't know when these lesions started  Consults  Review of Systems   Constitutional: Negative  HENT: Negative  Eyes: Negative  Respiratory: Negative  Cardiovascular: Negative  Gastrointestinal: Negative  Musculoskeletal: Negative   Skin:Bleeding, tissue loss Left foot   Neurological: Negative  Psych: negative         Historical Information   Past Medical History:   Diagnosis Date    CH[facility]congestive heart failure) ([facility])     COPD (chronic obstructive pulmonary disease) ([facility])     Decubitus ulcer of heel     LAST ASSESSED [id number]    Diabetes mellitus ([facility] [address] )     History of varicose veins     Hypertension     Intermittent claudication ([facility])     Neuropathy     Seasonal allergies      Past Surgical History:   Procedure Laterality Date    ANGIOPLASTY      W/ FLUOROSC ANGIOGRAPGY PERIPHERAL ARTERY ADDITIONAL  LAST ASSESSED [id number]    ANGIOPLASTY / STENTING FEMORAL      TANSCATH INTRAVASCULAR STENT PLACEMENT PERCUTANEOUS FEMORAL     FULL THICKNESS SKIN GRAFT      TENDON REPAIR      TOE AMPUTATION Left 12/27/2018    Procedure: AMPUTATION left 4th TOE;  Surgeon: Thomas Gilman DPM;  Location: [facility] OR;  Service: Podiatry     Social History   Social History     Substance and Sexual Activity   Alcohol Use Not Currently     Social History     Substance and Sexual Activity   Drug Use Not Currently     Social History     Tobacco Use   Smoking Status Never Smoker   Smokeless Tobacco Never Used     Family History:   Family History   Problem Relation Age of Onset    Other Mother         CARDIAC DISORDER     Diabetes Mother     Cancer Father     Other Family         CARDIAC DISORDER     Diabetes Family     Cancer Family     Mental illness Family        Meds/Allergies   Medications Prior to Admission   Medication    albuterol (PROVENTIL HFA,VENTOLIN HFA) 90 mcg/act inhaler    allopurinol (ZYLOPRIM) 300 mg tablet    atorvastatin (LIPITOR) 20 mg tablet    BD INSULIN SYRINGE U/F 31G X 5/16" 0 5 ML MISC    BD PEN NEEDLE HILARIO U/F 32G X 4 MM MISC    fluticasone-salmeterol (ADVAIR DISKUS, WIXELA INHUB) 250-50 mcg/dose inhaler    glucose blood (ONE TOUCH ULTRA TEST) test strip    insulin aspart (NOVOLOG FLEXPEN) 100 Units/mL injection pen    Insulin Pen Needle 31G X 5 MM MISC    losartan (COZAAR) 50 mg tablet    metFORMIN (GLUCOPHAGE) 1000 MG tablet    metoprolol tartrate (LOPRESSOR) 25 mg tablet    spironolactone (ALDACTONE) 25 mg tablet    torsemide (DEMADEX) 20 mg tablet    warfarin (COUMADIN) 5 mg tablet     Allergies   Allergen Reactions    Latex Rash       Objective   First Vitals:   Blood Pressure: 121/57 (07/25/19 1255)  Pulse: 79 (07/25/19 1255)  Temperature: 98 6 °F (37 °C) (07/25/19 1255)  Temp Source: Tympanic (07/25/19 1255)  Respirations: 20 (07/25/19 1255)  Height: 6' 3" (190 5 cm) (07/25/19 1255)  Weight - Scale: (!) 175 kg (385 lb 12 9 oz) (07/25/19 1255)  SpO2: 96 % (07/25/19 1255)    Current Vitals:   Blood Pressure: 133/60 (07/27/19 0747)  Pulse: 86 (07/27/19 0747)  Temperature: 98 5 °F (36 9 °C) (07/27/19 0747)  Temp Source: Oral (07/27/19 0747)  Respirations: 18 (07/27/19 0747)  Height: 6' 3" (190 5 cm) (07/25/19 1921)  Weight - Scale: (!) 167 kg (368 lb 9 8 oz) (07/27/19 0600)  SpO2: 92 % (07/27/19 0747)        /60 (BP Location: Left arm)   Pulse 86   Temp 98 5 °F (36 9 °C) (Oral)   Resp 18   Ht 6' 3" (1 905 m)   Wt (!) 167 kg (368 lb 9 8 oz)   SpO2 92%   BMI 46 07 kg/m²      General Appearance:    Alert, cooperative, no distress   Head:    Normocephalic, without obvious abnormality, atraumatic   Eyes:    PERRL, conjunctiva/corneas clear, EOM's intact        Nose:   Moist mucous membranes   Neck:   Supple, symmetrical, trachea midline   Back:     Symmetric   Lungs:     Respirations unlabored   Heart:    Regular rate and rhythm, S1 and S2 normal, no murmur, rub   or gallop   Abdomen:     Soft, non-tender   Extremities:   Grossly enlarged LLE due to chronic lymphedema  There is hemosiderin deposits and lipodermatosclerosis throughout  There is an anterior ankle eschar draining purulent material  There is significant tissue loss / necrosis on the plantar mid-arch encompassing about 30% of the plantar surface  The entire heel also demonstrates deep tissue injury  The entire foot is warm, red and appears cellulitic  The foot is collapsed into a rockerbottom Charcot defomity and is basically functionless  I did spend time reviewing the report and images personally (plain films obtained on 7/25/19) and compared them to 3/18/19 images and note progression of the neuroarthropathy of the Lisfranc's joint complex  Pulses:   Difficult to palpate through the edema; capillary fill is intact to the toes       Neurologic:   Gross sensation is intact  Protective sensation is absent             Lab Results:   Admission on 07/25/2019   Component Date Value    WBC 07/25/2019 9 15     RBC 07/25/2019 3 74*    Hemoglobin 07/25/2019 10 3*    Hematocrit 07/25/2019 34 2*    MCV 07/25/2019 91     MCH 07/25/2019 27 5     MCHC 07/25/2019 30 1*    RDW 07/25/2019 15 7*    MPV 07/25/2019 9 9     Platelets 16/87/7632 334     nRBC 07/25/2019 0     Neutrophils Relative 07/25/2019 77*    Immat GRANS % 07/25/2019 1     Lymphocytes Relative 07/25/2019 11*    Monocytes Relative 07/25/2019 8     Eosinophils Relative 07/25/2019 2     Basophils Relative 07/25/2019 1     Neutrophils Absolute 07/25/2019 7 06     Immature Grans Absolute 07/25/2019 0 09     Lymphocytes Absolute 07/25/2019 1 02     Monocytes Absolute 07/25/2019 0 71     Eosinophils Absolute 07/25/2019 0 21     Basophils Absolute 07/25/2019 0 06     Sodium 07/25/2019 137     Potassium 07/25/2019 5 4*    Chloride 07/25/2019 101     CO2 07/25/2019 30     ANION GAP 07/25/2019 6     BUN 07/25/2019 36*    Creatinine 07/25/2019 1 32*    Glucose 07/25/2019 141*    Calcium 07/25/2019 8 9     AST 07/25/2019 19     ALT 07/25/2019 24     Alkaline Phosphatase 07/25/2019 247*    Total Protein 07/25/2019 8 4*    Albumin 07/25/2019 2 7*    Total Bilirubin 07/25/2019 0 80     eGFR 07/25/2019 56     Sed Rate 07/25/2019 134*    Blood Culture 07/25/2019 No Growth at 24 hrs   Blood Culture 07/25/2019 No Growth at 24 hrs       Wound Culture 07/25/2019 4+ Growth of Proteus mirabilis*    Wound Culture 07/25/2019 4+ Growth of      Gram Stain Result 07/25/2019 No polys seen*    Gram Stain Result 07/25/2019 4+ Gram positive rods*    Gram Stain Result 07/25/2019 3+ Gram negative rods*    Gram Stain Result 07/25/2019 3+ Gram positive cocci in pairs*    Wound Culture 07/25/2019 4+ Growth of Methicillin Resistant Staphylococcus aureus*    Wound Culture 07/25/2019 1+ Growth of Proteus mirabilis*    Wound Culture 07/25/2019 4+ Growth of      Gram Stain Result 07/25/2019 No polys seen*    Gram Stain Result 07/25/2019 1+ Gram positive cocci in pairs*    Gram Stain Result 07/25/2019 1+ Gram positive rods*    POC Glucose 07/25/2019 149*    POC Glucose 07/25/2019 142*    POC Glucose 07/25/2019 191*    Sodium 07/26/2019 138     Potassium 07/26/2019 5 0     Chloride 07/26/2019 104     CO2 07/26/2019 26     ANION GAP 07/26/2019 8     BUN 07/26/2019 36*    Creatinine 07/26/2019 1 24     Glucose 07/26/2019 102     Calcium 07/26/2019 8 5     eGFR 07/26/2019 60     WBC 07/26/2019 8 06     RBC 07/26/2019 3 52*    Hemoglobin 07/26/2019 9 7*    Hematocrit 07/26/2019 32 3*    MCV 07/26/2019 92     MCH 07/26/2019 27 6     [facility] 07/26/2019 30 0*    RDW 07/26/2019 15 8*    MPV 07/26/2019 9 8     Platelets 20/38/2773 301     nRBC 07/26/2019 0     Neutrophils Relative 07/26/2019 73     Immat GRANS % 07/26/2019 1     Lymphocytes Relative 07/26/2019 13*    Monocytes Relative 07/26/2019 9     Eosinophils Relative 07/26/2019 3     Basophils Relative 07/26/2019 1     Neutrophils Absolute 07/26/2019 5 92     Immature Grans Absolute 07/26/2019 0 07     Lymphocytes Absolute 07/26/2019 1 04     Monocytes Absolute 07/26/2019 0 76     Eosinophils Absolute 07/26/2019 0 21     Basophils Absolute 07/26/2019 0 06     POC Glucose 07/26/2019 123     POC Glucose 07/26/2019 151*    POC Glucose 07/26/2019 105     POC Glucose 07/26/2019 64*    Sodium 07/27/2019 134*    Potassium 07/27/2019 5 0     Chloride 07/27/2019 98*    CO2 07/27/2019 27     ANION GAP 07/27/2019 9     BUN 07/27/2019 38*    Creatinine 07/27/2019 1 42*    Glucose 07/27/2019 120     Calcium 07/27/2019 8 8     eGFR 07/27/2019 51     WBC 07/27/2019 8 60     RBC 07/27/2019 3 94     Hemoglobin 07/27/2019 10 7*    Hematocrit 07/27/2019 35 7*    MCV 07/27/2019 91     MCH 07/27/2019 27 2     MCHC 07/27/2019 30 0*    RDW 07/27/2019 15 7*    MPV 07/27/2019 9 8     Platelets 76/77/7825 357     nRBC 07/27/2019 0     Neutrophils Relative 07/27/2019 76*    Immat GRANS % 07/27/2019 1     Lymphocytes Relative 07/27/2019 12*    Monocytes Relative 07/27/2019 7     Eosinophils Relative 07/27/2019 3     Basophils Relative 07/27/2019 1     Neutrophils Absolute 07/27/2019 6 57     Immature Grans Absolute 07/27/2019 0 08     Lymphocytes Absolute 07/27/2019 1 01     Monocytes Absolute 07/27/2019 0 64     Eosinophils Absolute 07/27/2019 0 24     Basophils Absolute 07/27/2019 0 06     POC Glucose 07/27/2019 136        Results from last 7 days   Lab Units 07/25/19  1351   GRAM STAIN RESULT  No polys seen*  4+ Gram positive rods*  3+ Gram negative rods*  3+ Gram positive cocci in pairs*  No polys seen*  1+ Gram positive cocci in pairs*  1+ Gram positive rods*       Results from last 7 days   Lab Units 07/25/19  1404 07/25/19  1346   BLOOD CULTURE  No Growth at 24 hrs  No Growth at 24 hrs  Imaging: I have personally reviewed pertinent films in PACS  EKG, Pathology, and Other Studies: I have personally reviewed pertinent reports

## 2019-07-27 NOTE — ASSESSMENT & PLAN NOTE
Lab Results   Component Value Date    HGBA1C 9 4 (H) 12/27/2018       Recent Labs     07/26/19  1106 07/26/19  1627 07/26/19  2138 07/27/19  0749   POCGLU 151* 105 64* 136     · 07/31/2019: Left AKA  · Patient completed the course of IV Vanco/Cefepime as per ID  · Monitor WBC and fever curve  · Continue Novolog/Lantus/sliding scale coverage  · Podiatry, ID general surgery on board  · Continue with current pain regimen

## 2019-07-27 NOTE — QUICK NOTE
Brief Consultation  Pt seen and examined with review of History, Examination, Studies and Records  Full Consult to follow  80 yo obese diabetic with CHF AFIB and chronic foot wounds  Left heel "bruise" progressed with drainage last week and increased swelling  Chronic bilateral lymphedema and followed by SHANTANU Hsu for over 4 years  LE neuropathy with remote history of left iliofemoral? arterial stent placement  Limited mobility with difficulty to bathroom due to lower extremities as well as shortness of breath and generalized weakness  Alert, appropriate and afebrile  Obese, soft without tenderness or pulsatile mass  Weak irregular bilateral femoral pulses  Chronic bilateral calf and shin stasis changes and thickening with mild left erythema and larger edema  Left anterolateral ankle with dark dry eschar within joint crease  Large left plantar skin sllugh with serous drainage  Plantar heel with some softness but no distinct abscess or fluctuance  Non tender  Left foot Xray reviewed with charcot changes  No obvious osteo  No subcutaneous gas  Nl WBC  HgbA1c>9    Limb salvage will be very challenging given his lymphedema stasis skin changes, poorly controlled diabetes, possible vascular insufficiency, charcot deformities and morbid obesity pressure combined with his cardiac and medical comorbidities  He understands that amputation may be inevitable but is not ready or agreeable to left lower leg removal at this time  MRI requested to evaluate for deep joint or bone  involvement and patient is aware that if confirmed, such deep infection may not respond to antibiotics and require amputation  LE arterial studies also requested with probable consideration for Vascular consultation to evaluate potential for wound or amputation healing which is already hindered by his stasis dermatitis and edema       If / when amputation is required, may need staged ame jeong by future closure in effort to preserve left knee joint for prosthetic fitting and minimal independent mobility  Temporizing local podiatric debridement may be considered for any signs of acute infection present while the above work up is completed during his medical optimization  Agree with broad antibiotics, heparinization cardiac stabilization while patients considers amputation and limited alternatives      55m

## 2019-07-27 NOTE — ASSESSMENT & PLAN NOTE
· Patient will be transition to heparin drip  Hold Coumadin for anticipated surgery  · cont Lopressor  · Maintain telemetry for next 48 hours

## 2019-07-27 NOTE — ASSESSMENT & PLAN NOTE
· Patient had volume overload post surgery  · Aggressive IV diuresis (Lasix 80 IV q12h)  as per Cardiology on 08/02/2019  · Continue beta-blockers/Aldactone/atorvastatin      Weight (last 2 days)     Date/Time   Weight    08/02/19 0510   (!) 174 (382 94)    08/01/19 0600   (!) 178 (393 3)    07/31/19 0600   (!) 167 (368 9)              Intake/Output Summary (Last 24 hours) at 8/3/2019 0949  Last data filed at 8/3/2019 0530  Gross per 24 hour   Intake 300 ml   Output 400 ml   Net -100 ml

## 2019-07-27 NOTE — ASSESSMENT & PLAN NOTE
· Patient has been on heparin drip and Coumadin on 08/01/2019 after discussion with surgery  Overlap until INR is therapeutic  · Continue Lopressor    · Cardiology on board

## 2019-07-27 NOTE — PROGRESS NOTES
Progress Note - Sebastian Gutierrez 1950, 79 y o  male MRN: [id number]  Unit/Bed#: [address] [place] [address] Encounter: [id number]  Primary Care Provider: Shilo Panda MD   Date and time admitted to hospital: 7/25/2019  1:04 PM        * Type 2 diabetes mellitus with left diabetic foot ulcer [facility])  Assessment & Plan  Lab Results   Component Value Date    HGBA1C 9 4 (H) 12/27/2018       Recent Labs     07/26/19  1106 07/26/19  1627 07/26/19  2138 07/27/19  0749   POCGLU 151* 105 64* 136       Blood Sugar Average: Last 72 hrs:  (P) 132 625     · Continue IV Vanco/Cefepime/Flagyl  · Continue Novolog/Lantus/sliding scale coverage  · Discontinue metformin  · Podiatry, ID on board  · Acute Care surgery consult by Podiatry for possible amputation  Chronic venous stasis dermatitis of both lower extremities  Assessment & Plan  · Chronic, POA  · podiatry/wound care  Chronic diastolic congestive heart failure [facility])  Assessment & Plan  Wt Readings from Last 3 Encounters:   07/27/19 (!) 167 kg (368 lb 9 8 oz)   07/02/19 (!) 175 kg (386 lb)   06/10/19 (!) 171 kg (378 lb)     · continue beta-blockers/Aldactone/Demadex        Chronic atrial fibrillation ([facility] [address] )  Assessment & Plan  · Patient will be transition to heparin drip  Hold Coumadin for anticipated surgery  · cont Lopressor  · Maintain telemetry for next 48 hours  Essential hypertension  Assessment & Plan  · continue beta-blocker/Aldactone/Cozaar    Gout  Assessment & Plan  · continue allopurinol    Moderate persistent asthma without complication  Assessment & Plan  · cont albuterol/Advair    Morbid obesity ([address] )  Assessment & Plan  · Encourage weight loss        VTE PROPHYLAXIS: Heparin drip    EDUCATION AND DISCUSSIONS WITH PATIENT/FAMILY: Patient updated      CURRENT LENGTH OF STAY: 2     CURRENT PATIENT STATUS: Inpatient     CERTIFICATION STATEMENT: The patient will continue to require additional inpatient hospital stay due to ongoing goals of care discussion as mentioned below  PATIENT CENTERED ROUNDS: I have performed bedside rounds with nursing staff today  ESTIMATED DISCHARGE DATE:  Anticipate discharge in next 2- 3days  CODE STATUS: Level 1 - Full Code      ______________________________________________________________________    SUBJECTIVE:   Patient seen and examined  Complaining of shortness of breath and palpitations  Difficult IV access  SOB +  OBJECTIVE:     Vitals:   Temp:  [97 7 °F (36 5 °C)-98 5 °F (36 9 °C)] 98 5 °F (36 9 °C)  HR:  [75-86] 86  Resp:  [18] 18  BP: (109-133)/(54-60) 133/60  SpO2:  [91 %-97 %] 92 %  Temp (24hrs), Av 1 °F (36 7 °C), Min:97 7 °F (36 5 °C), Max:98 5 °F (36 9 °C)  Current: Temperature: 98 5 °F (36 9 °C)    Intake/Output Summary (Last 24 hours) at 2019 1115  Last data filed at 2019 0933  Gross per 24 hour   Intake --   Output 3550 ml   Net -3550 ml       Physical Exam:   GENERAL: AAO x 3  Morbid obesity  HEENT: atraumatic, normocephalic  Oral mucosa moist, no icterus, pallor  PERRLA +  Neck supple, no JVD, no lymphadenopathy, no thryomegaly  CHEST: B/L breath sounds heard, occasional wheezing  CVS: S1, S2   ABDOMEN: Soft/obese/NT/BS heard  NEUROLOGICAL: CN II -XI grossly intact  No focal motor or sensory deficits  No signs of meningeal irritation or cerebellar dysfunction  EXTREMITIES:  Bilateral pitting pedal edema, grossly enlarged left lower extremity due to chronic lymphedema  There is an anterior ankle eschar draining purulent material  There is significant tissue loss / necrosis on the plantar mid-arch encompassing about 30% of the plantar surface  The entire heel also demonstrates deep tissue injury  The entire foot is warm, red and appears cellulitic  The foot is collapsed into a rockerbottom Charcot defomity      LABS:   2019 07:44 2019 07:49   POC Glucose  136   Sodium 134 (L)    Chloride 98 (L)    CO2 27    Anion Gap 9    BUN 38 (H)    Creatinine 1 42 (H)    Glucose, Random 120 Calcium 8 8    eGFR 51    WBC 8 60    Red Blood Cell Count 3 94    Hemoglobin 10 7 (L)    HCT 35 7 (L)    MCV 91    MCH 27 2    MCHC 30 0 (L)    RDW 15 7 (H)    Platelet Count 699    MPV 9 8    nRBC 0    Neutrophils % 76 (H)    Immat GRANS % 1    Lymphocytes Relative 12 (L)    Monocytes Relative 7    Eosinophils 3    Basophils Relative 1    Immature Grans Absolute 0 08    Absolute Neutrophils 6 57    Lymphocytes Absolute 1 01    Absolute Monocytes 0 64    Absolute Eosinophils 0 24    Basophils Absolute 0 06      Scheduled Meds[de-identified]  Current Facility-Administered Medications:  acetaminophen 650 mg Oral Q6H PRN Gregory Caruso MD    albuterol 2 puff Inhalation Q6H PRN Gregory Caruso MD    allopurinol 300 mg Oral Daily Gregory Caruso MD    atorvastatin 20 mg Oral Daily Gregory Caruso MD    cefepime 2,000 mg Intravenous Q12H Gregory Caruso MD Last Rate: 2,000 mg (07/27/19 0331)   fluticasone-vilanterol 1 puff Inhalation Daily Gregory Caruso MD    heparin  Intravenous Once Himanshu Cordova MD    insulin glargine 28 Units Subcutaneous HS Jessica Cruz MD    insulin lispro 2-12 Units Subcutaneous TID AC Gregory Caruso MD    insulin lispro 30 Units Subcutaneous TID With Meals Gregory Caruso MD    losartan 50 mg Oral Daily Gregory Caruso MD    metoprolol tartrate 25 mg Oral Q12H 1100 Horacio Farias MD    metroNIDAZOLE 500 mg Intravenous Q8H Gregory Caruso MD Last Rate: 500 mg (07/27/19 0843)   spironolactone 25 mg Oral Daily Gregory Caruso MD    torsemide 20 mg Oral BID Gregory Caruso MD    vancomycin 1,500 mg Intravenous Q8H Gregory Caruso MD Last Rate: 1,500 mg (07/27/19 0855)     Continuous Infusion: Heparin gtt   PRN Meds:   acetaminophen    albuterol    Time Spent for Care: 20 minutes  More than 50% of total time spent on counseling and coordination of care as described above      Deb Gonzalez MD  HOSPITALIST SERVICES  7/27/2019    PLEASE NOTE:  This encounter was completed utilizing the [employer] Direct Speech Voice Recognition Software  Grammatical errors, random word insertions, pronoun errors and incomplete sentences are occasional consequences of the system due to software limitations, ambient noise and hardware issues  These may be missed by proof reading prior to affixing electronic signature  Any questions or concerns about the content, text or information contained within the body of this dictation should be directly addressed to the physician for clarification  Please do not hesitate to call me directly if you have any any questions or concerns

## 2019-07-27 NOTE — PROCEDURES
Procedure Note - Ultrasound Guided Peripheral IV    Ultrasound dynamic guidance was used for peripheral line insertion  This is a billable ultrasound guided procedure  Risks and benefits of the procedure were discussed with the patient  Discussed risks included pain with the procedure, bleeding, and risk of infection  Indication: Difficult non-ultrasound guided peripheral intravenous line insertion  Location: left upper extremity  Procedure: The patient was prepped using standard ultrasound guided IV procedures  Using direct visualization of the intravenous catheter/needle, the vessel was successfully cannulated with return of blood and advancement of the catheter  The catheter was secured in the standard technique  Complications: None  Interpretation: Successful ultrasound guided peripheral IV

## 2019-07-27 NOTE — ASSESSMENT & PLAN NOTE
Wt Readings from Last 3 Encounters:   07/27/19 (!) 167 kg (368 lb 9 8 oz)   07/02/19 (!) 175 kg (386 lb)   06/10/19 (!) 171 kg (378 lb)     · continue beta-blockers/Aldactone/Demadex

## 2019-07-27 NOTE — PROGRESS NOTES
Phone call from Pharmacy looking for vanco trough result  Spoke with Edgard Olguin in Lab, result is 43 1    Pharmacist notified  No Vanco dose to be administered now  Pharmacist to D/C order and put in orders for next Vanco trough and will administer next Vanco dose if result less than 20

## 2019-07-27 NOTE — PROGRESS NOTES
Vancomycin Assessment    Justina Almazan is a 79 y o  male who is currently receiving vancomycin 1500 mg q8h for skin-soft tissue infection     Relevant clinical data and objective history reviewed:  Creatinine   Date Value Ref Range Status   07/27/2019 1 42 (H) 0 60 - 1 30 mg/dL Final     Comment:     Standardized to IDMS reference method   07/26/2019 1 24 0 60 - 1 30 mg/dL Final     Comment:     Standardized to IDMS reference method   07/25/2019 1 32 (H) 0 60 - 1 30 mg/dL Final     Comment:     Standardized to IDMS reference method   01/15/2018 0 99 0 70 - 1 25 mg/dL Final     Comment:     Result Comment: For patients >52years of age, the reference limit  for Creatinine is approximately 13% higher for people  identified as -American      05/25/2017 1 31 (H) 0 70 - 1 25 mg/dL Final     Comment:     Result Comment: For patients >52years of age, the reference limit  for Creatinine is approximately 13% higher for people  identified as -American      12/09/2016 1 18 0 70 - 1 25 mg/dL Final     Comment:     Result Comment: For patients >52years of age, the reference limit  for Creatinine is approximately 13% higher for people  identified as -American         /77   Pulse 86   Temp 98 6 °F (37 °C) (Oral)   Resp 20   Ht 6' 3" (1 905 m)   Wt (!) 167 kg (368 lb 9 8 oz)   SpO2 93%   BMI 46 07 kg/m²   I/O last 3 completed shifts:  In: -   Out: 8332 [Urine:5950]  Lab Results   Component Value Date/Time    BUN 38 (H) 07/27/2019 07:44 AM    BUN 27 (H) 01/23/2018 02:29 PM    WBC 8 60 07/27/2019 07:44 AM    WBC 5 16 12/23/2015 06:00 AM    HGB 10 7 (L) 07/27/2019 07:44 AM    HGB 10 6 (L) 12/23/2015 06:00 AM    HCT 35 7 (L) 07/27/2019 07:44 AM    HCT 34 5 (L) 12/23/2015 06:00 AM    MCV 91 07/27/2019 07:44 AM    MCV 91 12/23/2015 06:00 AM     07/27/2019 07:44 AM     12/23/2015 06:00 AM     Temp Readings from Last 3 Encounters:   07/27/19 98 6 °F (37 °C) (Oral)   05/07/19 97 9 °F (36 6 °C)   04/26/19 97 5 °F (36 4 °C) (Temporal)     Vancomycin Days of Therapy: 3    Assessment/Plan  The patient is currently on vancomycin utilizing pulse dosing  Baseline risks associated with therapy include: none   The patient is receiving 1500 mg q8h with the most recent vancomycin level being at steady-state and supratherapeutic based on a goal of 15-20 (appropriate for most indications) ; therefore, after clinical evaluation will be changed to 15 mg/kg when random level < 20   Pharmacy will continue to follow closely for s/sx of nephrotoxicity, infusion reactions and appropriateness of therapy  BMP and CBC will be ordered per protocol  Plan for random level to drawn @ 0800 on 07/28/19 and dose accordingly  Pharmacy will continue to follow the patients culture results and clinical progress daily      Amita Gustafson, Pharmacist

## 2019-07-28 LAB
ANION GAP SERPL CALCULATED.3IONS-SCNC: 12 MMOL/L (ref 4–13)
APTT PPP: 49 SECONDS (ref 23–37)
APTT PPP: 57 SECONDS (ref 23–37)
APTT PPP: 60 SECONDS (ref 23–37)
BASOPHILS # BLD AUTO: 0.05 THOUSANDS/ΜL (ref 0–0.1)
BASOPHILS NFR BLD AUTO: 1 % (ref 0–1)
BUN SERPL-MCNC: 48 MG/DL (ref 5–25)
CALCIUM SERPL-MCNC: 8.5 MG/DL (ref 8.3–10.1)
CHLORIDE SERPL-SCNC: 100 MMOL/L (ref 100–108)
CO2 SERPL-SCNC: 24 MMOL/L (ref 21–32)
CREAT SERPL-MCNC: 1.52 MG/DL (ref 0.6–1.3)
EOSINOPHIL # BLD AUTO: 0.15 THOUSAND/ΜL (ref 0–0.61)
EOSINOPHIL NFR BLD AUTO: 2 % (ref 0–6)
ERYTHROCYTE [DISTWIDTH] IN BLOOD BY AUTOMATED COUNT: 15.9 % (ref 11.6–15.1)
GFR SERPL CREATININE-BSD FRML MDRD: 47 ML/MIN/1.73SQ M
GLUCOSE SERPL-MCNC: 154 MG/DL (ref 65–140)
GLUCOSE SERPL-MCNC: 158 MG/DL (ref 65–140)
GLUCOSE SERPL-MCNC: 166 MG/DL (ref 65–140)
GLUCOSE SERPL-MCNC: 181 MG/DL (ref 65–140)
GLUCOSE SERPL-MCNC: 193 MG/DL (ref 65–140)
HCT VFR BLD AUTO: 30.8 % (ref 36.5–49.3)
HCT VFR BLD AUTO: 35.8 % (ref 36.5–49.3)
HGB BLD-MCNC: 10.8 G/DL (ref 12–17)
HGB BLD-MCNC: 9.5 G/DL (ref 12–17)
IMM GRANULOCYTES # BLD AUTO: 0.05 THOUSAND/UL (ref 0–0.2)
IMM GRANULOCYTES NFR BLD AUTO: 1 % (ref 0–2)
LYMPHOCYTES # BLD AUTO: 1.08 THOUSANDS/ΜL (ref 0.6–4.47)
LYMPHOCYTES NFR BLD AUTO: 15 % (ref 14–44)
MCH RBC QN AUTO: 27.6 PG (ref 26.8–34.3)
MCHC RBC AUTO-ENTMCNC: 30.8 G/DL (ref 31.4–37.4)
MCV RBC AUTO: 90 FL (ref 82–98)
MONOCYTES # BLD AUTO: 0.65 THOUSAND/ΜL (ref 0.17–1.22)
MONOCYTES NFR BLD AUTO: 9 % (ref 4–12)
NEUTROPHILS # BLD AUTO: 5.02 THOUSANDS/ΜL (ref 1.85–7.62)
NEUTS SEG NFR BLD AUTO: 72 % (ref 43–75)
NRBC BLD AUTO-RTO: 0 /100 WBCS
PLATELET # BLD AUTO: 313 THOUSANDS/UL (ref 149–390)
PMV BLD AUTO: 9.4 FL (ref 8.9–12.7)
POTASSIUM SERPL-SCNC: 4.5 MMOL/L (ref 3.5–5.3)
RBC # BLD AUTO: 3.44 MILLION/UL (ref 3.88–5.62)
SODIUM SERPL-SCNC: 136 MMOL/L (ref 136–145)
VANCOMYCIN SERPL-MCNC: 22.1 UG/ML
VANCOMYCIN SERPL-MCNC: 26.8 UG/ML
WBC # BLD AUTO: 7 THOUSAND/UL (ref 4.31–10.16)

## 2019-07-28 PROCEDURE — 94760 N-INVAS EAR/PLS OXIMETRY 1: CPT

## 2019-07-28 PROCEDURE — 99232 SBSQ HOSP IP/OBS MODERATE 35: CPT | Performed by: INTERNAL MEDICINE

## 2019-07-28 PROCEDURE — 80202 ASSAY OF VANCOMYCIN: CPT | Performed by: HOSPITALIST

## 2019-07-28 PROCEDURE — 85730 THROMBOPLASTIN TIME PARTIAL: CPT | Performed by: INTERNAL MEDICINE

## 2019-07-28 PROCEDURE — 94640 AIRWAY INHALATION TREATMENT: CPT

## 2019-07-28 PROCEDURE — 85018 HEMOGLOBIN: CPT | Performed by: NURSE PRACTITIONER

## 2019-07-28 PROCEDURE — 82948 REAGENT STRIP/BLOOD GLUCOSE: CPT

## 2019-07-28 PROCEDURE — 80048 BASIC METABOLIC PNL TOTAL CA: CPT | Performed by: HOSPITALIST

## 2019-07-28 PROCEDURE — 85025 COMPLETE CBC W/AUTO DIFF WBC: CPT | Performed by: HOSPITALIST

## 2019-07-28 PROCEDURE — 85730 THROMBOPLASTIN TIME PARTIAL: CPT | Performed by: HOSPITALIST

## 2019-07-28 PROCEDURE — 85014 HEMATOCRIT: CPT | Performed by: NURSE PRACTITIONER

## 2019-07-28 RX ADMIN — TORSEMIDE 20 MG: 20 TABLET ORAL at 17:49

## 2019-07-28 RX ADMIN — INSULIN LISPRO 2 UNITS: 100 INJECTION, SOLUTION INTRAVENOUS; SUBCUTANEOUS at 08:42

## 2019-07-28 RX ADMIN — HEPARIN SODIUM 2000 UNITS: 1000 INJECTION, SOLUTION INTRAVENOUS; SUBCUTANEOUS at 17:40

## 2019-07-28 RX ADMIN — HEPARIN SODIUM AND DEXTROSE 15.1 UNITS/KG/HR: 10000; 5 INJECTION INTRAVENOUS at 09:03

## 2019-07-28 RX ADMIN — TORSEMIDE 20 MG: 20 TABLET ORAL at 08:48

## 2019-07-28 RX ADMIN — METOPROLOL TARTRATE 25 MG: 25 TABLET, FILM COATED ORAL at 08:48

## 2019-07-28 RX ADMIN — ATORVASTATIN CALCIUM 20 MG: 20 TABLET, FILM COATED ORAL at 08:48

## 2019-07-28 RX ADMIN — CEFEPIME HYDROCHLORIDE 2000 MG: 2 INJECTION, SOLUTION INTRAVENOUS at 03:30

## 2019-07-28 RX ADMIN — INSULIN LISPRO 30 UNITS: 100 INJECTION, SOLUTION INTRAVENOUS; SUBCUTANEOUS at 08:42

## 2019-07-28 RX ADMIN — INSULIN LISPRO 2 UNITS: 100 INJECTION, SOLUTION INTRAVENOUS; SUBCUTANEOUS at 11:51

## 2019-07-28 RX ADMIN — INSULIN LISPRO 2 UNITS: 100 INJECTION, SOLUTION INTRAVENOUS; SUBCUTANEOUS at 17:51

## 2019-07-28 RX ADMIN — CEFEPIME HYDROCHLORIDE 2000 MG: 2 INJECTION, SOLUTION INTRAVENOUS at 15:52

## 2019-07-28 RX ADMIN — METRONIDAZOLE 500 MG: 500 INJECTION, SOLUTION INTRAVENOUS at 17:49

## 2019-07-28 RX ADMIN — METOPROLOL TARTRATE 25 MG: 25 TABLET, FILM COATED ORAL at 21:19

## 2019-07-28 RX ADMIN — INSULIN LISPRO 30 UNITS: 100 INJECTION, SOLUTION INTRAVENOUS; SUBCUTANEOUS at 11:51

## 2019-07-28 RX ADMIN — HEPARIN SODIUM 2000 UNITS: 1000 INJECTION, SOLUTION INTRAVENOUS; SUBCUTANEOUS at 04:30

## 2019-07-28 RX ADMIN — INSULIN LISPRO 30 UNITS: 100 INJECTION, SOLUTION INTRAVENOUS; SUBCUTANEOUS at 17:51

## 2019-07-28 RX ADMIN — INSULIN GLARGINE 28 UNITS: 100 INJECTION, SOLUTION SUBCUTANEOUS at 21:19

## 2019-07-28 RX ADMIN — METRONIDAZOLE 500 MG: 500 INJECTION, SOLUTION INTRAVENOUS at 00:34

## 2019-07-28 RX ADMIN — SPIRONOLACTONE 25 MG: 25 TABLET ORAL at 08:48

## 2019-07-28 RX ADMIN — ALLOPURINOL 300 MG: 300 TABLET ORAL at 08:48

## 2019-07-28 RX ADMIN — LOSARTAN POTASSIUM 50 MG: 50 TABLET, FILM COATED ORAL at 08:48

## 2019-07-28 RX ADMIN — FLUTICASONE FUROATE AND VILANTEROL TRIFENATATE 1 PUFF: 100; 25 POWDER RESPIRATORY (INHALATION) at 08:00

## 2019-07-28 RX ADMIN — METRONIDAZOLE 500 MG: 500 INJECTION, SOLUTION INTRAVENOUS at 08:51

## 2019-07-28 NOTE — PROGRESS NOTES
Progress Note - Fernanda Rahman 1950, 79 y o  male MRN: [id number]  Unit/Bed#: [address] [place] [address] Encounter: [id number]  Primary Care Provider: Nazario Ramirez MD   Date and time admitted to hospital: 7/25/2019  1:04 PM        * Type 2 diabetes mellitus with left diabetic foot ulcer [facility])  Assessment & Plan  Lab Results   Component Value Date    HGBA1C 9 4 (H) 12/27/2018       Recent Labs     07/26/19  1106 07/26/19  1627 07/26/19  2138 07/27/19  0749   POCGLU 151* 105 64* 136       Blood Sugar Average: Last 72 hrs:  (P) 132 625     · Continue IV Vanco/Cefepime/Flagyl  · Continue Novolog/Lantus/sliding scale coverage  · Discontinue metformin  · Podiatry, ID on board  · Acute Care surgery consult by Podiatry for possible amputation  · The anterior ankle eschar is draining purulent material    · There is significant tissue loss/necrosis on the plantar mid-arch encompassing about 30% of the plantar surface  · The entire heel also demonstrates deep tissue injury  · The entire foot is warm, red and appears cellulitic  · The foot is collapsed into a rockerbottom Charcot defomity  ·     Chronic venous stasis dermatitis of both lower extremities  Assessment & Plan  · Chronic, POA  · podiatry/wound care  Chronic diastolic congestive heart failure [facility])  Assessment & Plan  Wt Readings from Last 3 Encounters:   07/28/19 (!) 165 kg (364 lb 3 2 oz)   07/02/19 (!) 175 kg (386 lb)   06/10/19 (!) 171 kg (378 lb)     · continue beta-blockers/Aldactone/Demadex        Chronic atrial fibrillation ([address] )  Assessment & Plan  · Patient will be transition to heparin drip  Hold Coumadin for anticipated surgery  · cont Lopressor  · Maintain telemetry for next 48 hours      Essential hypertension  Assessment & Plan  · continue beta-blocker/Aldactone/Cozaar    Gout  Assessment & Plan  · continue allopurinol    Moderate persistent asthma without complication  Assessment & Plan  · cont albuterol/Advair    Morbid obesity ([facility] [address] )  Assessment & Plan  · Encourage weight loss      VTE PROPHYLAXIS: Heparin drip    EDUCATION AND DISCUSSIONS WITH PATIENT/FAMILY: Patient updated  CURRENT LENGTH OF STAY: 3     CURRENT PATIENT STATUS: Inpatient     CERTIFICATION STATEMENT: The patient will continue to require additional inpatient hospital stay due to ongoing goals of care discussion as mentioned below  PATIENT CENTERED ROUNDS: I have performed bedside rounds with nursing staff today  ESTIMATED DISCHARGE DATE: Awaiting workup for his diabetic left foot ulcer  No estimated discharge date as of today  CODE STATUS: Level 1 - Full Code      ______________________________________________________________________    SUBJECTIVE:   Patient seen and examined  He appears comfortable  Shortness of breath improved  Awaiting workup for his diabetic left foot ulcer  As discussed with Podiatry -- patient may require amputation  In view of his poor IV access, consent for PICC line placement obtained  No acute events overnight  OBJECTIVE:     Vitals:   Temp:  [98 °F (36 7 °C)-98 6 °F (37 °C)] 98 5 °F (36 9 °C)  HR:  [77-90] 80  Resp:  [18-22] 18  BP: (100-138)/(55-77) 123/70  SpO2:  [89 %-95 %] 91 %  Temp (24hrs), Av 4 °F (36 9 °C), Min:98 °F (36 7 °C), Max:98 6 °F (37 °C)  Current: Temperature: 98 5 °F (36 9 °C)    Intake/Output Summary (Last 24 hours) at 2019 1137  Last data filed at 2019 0035  Gross per 24 hour   Intake --   Output 1400 ml   Net -1400 ml       Physical Exam:   GENERAL: AAO x 3  Morbid obesity  HEENT: atraumatic, normocephalic  Oral mucosa moist, no icterus, pallor  PERRLA +  Neck supple, no JVD, no lymphadenopathy, no thryomegaly  CHEST: B/L breath sounds heard, occasional wheezing  CVS: S1, S2   ABDOMEN: Soft/obese/NT/BS heard  NEUROLOGICAL: CN II -XI grossly intact  No focal motor or sensory deficits  No signs of meningeal irritation or cerebellar dysfunction    EXTREMITIES:  Bilateral pitting pedal edema, grossly enlarged left lower extremity due to chronic lymphedema  There is an anterior ankle eschar draining purulent material  There is significant tissue loss / necrosis on the plantar mid-arch encompassing about 30% of the plantar surface  The entire heel also demonstrates deep tissue injury  The entire foot is warm, red and appears cellulitic  The foot is collapsed into a rockerbottom Charcot defomity      LABS:   7/28/2019 03:41 7/28/2019 04:18   Sodium  136   Potassium  4 5   Chloride  100   CO2  24   Anion Gap  12   BUN  48 (H)   Creatinine  1 52 (H)   Calcium  8 5   eGFR  47   WBC 7 00    Red Blood Cell Count 3 44 (L)    Hemoglobin 9 5 (L)    HCT 30 8 (L)    MCV 90    MCH 27 6    MCHC 30 8 (L)    RDW 15 9 (H)    Platelet Count 504    MPV 9 4    nRBC 0    Neutrophils % 72    Immat GRANS % 1    Lymphocytes Relative 15    Monocytes Relative 9    Eosinophils 2    Basophils Relative 1    Immature Grans Absolute 0 05    Absolute Neutrophils 5 02    Lymphocytes Absolute 1 08    Absolute Monocytes 0 65    Absolute Eosinophils 0 15    Basophils Absolute 0 05      Scheduled Meds[de-identified]    Current Facility-Administered Medications:  acetaminophen 650 mg Oral Q6H PRN Alex Marie MD    albuterol 2 puff Inhalation Q6H PRN Alex Marie MD    allopurinol 300 mg Oral Daily Alex Marie MD    atorvastatin 20 mg Oral Daily Alex Marie MD    cefepime 2,000 mg Intravenous Q12H Alex Marie MD Last Rate: Stopped (07/28/19 0400)   fluticasone-vilanterol 1 puff Inhalation Daily Alex Marie MD    heparin (porcine) 3-20 Units/kg/hr (Order-Specific) Intravenous Titrated Gregory Diego MD Last Rate: 15 1 Units/kg/hr (07/28/19 0903)   heparin (porcine) 2,000 Units Intravenous PRN Jessica Arnett MD    heparin (porcine) 4,000 Units Intravenous PRN Gregory Diego MD    insulin glargine 28 Units Subcutaneous HS Jessica Arnett MD    insulin lispro 2-12 Units Subcutaneous TID AC Alex Marie MD    insulin lispro 30 Units Subcutaneous TID With Meals Lisa Bauer MD    losartan 50 mg Oral Daily Lisa Bauer MD    metoprolol tartrate 25 mg Oral Q12H 1100 Pollocklatonia Farias MD    metroNIDAZOLE 500 mg Intravenous Q8H Lisa Bauer MD Last Rate: 500 mg (07/28/19 9372)   spironolactone 25 mg Oral Daily Lisa Bauer MD    torsemide 20 mg Oral BID Lisa Bauer MD    vancomycin 15 mg/kg (Adjusted) Intravenous Once PRN Lisa Bauer MD      Continuous Infusion: Heparin gtt    heparin (porcine) 3-20 Units/kg/hr (Order-Specific) Last Rate: 15 1 Units/kg/hr (07/28/19 0903)     PRN Meds    acetaminophen    albuterol    heparin (porcine)    heparin (porcine)    vancomycin    Time Spent for Care: 20 minutes  More than 50% of total time spent on counseling and coordination of care as described above  Raymond Ferraro MD  HOSPITALIST SERVICES  7/28/2019    PLEASE NOTE:  This encounter was completed utilizing the [email or web address] Direct Speech Voice Recognition Software  Grammatical errors, random word insertions, pronoun errors and incomplete sentences are occasional consequences of the system due to software limitations, ambient noise and hardware issues  These may be missed by proof reading prior to affixing electronic signature  Any questions or concerns about the content, text or information contained within the body of this dictation should be directly addressed to the physician for clarification  Please do not hesitate to call me directly if you have any any questions or concerns

## 2019-07-28 NOTE — CONSULTS
Consultation - [facility] MAYRA Cummins 79 y o  male MRN: [id number]  Unit/Bed#: [address] [place] [address] Encounter: [id number]    Pt seen and examined with review of History, Examination, Studies and Records  Assessment/Plan   Assessment:  78 yo diabetic charcot foot with left plantar draining wound and cellulitis  Chronic lower leg lymphedema and stasis dermatitis  Chronic AFIB on Coumadin with mobility limiting dyspnea and COPD  Plan:  Agree with podiatric management  May indeed require LLE amputation but patient still hoping to avoid this if at all possible  Limb salvage will be very challenging given his lymphedema stasis skin changes, poorly controlled diabetes, possible vascular insufficiency, charcot deformities and morbid obesity pressure combined with his cardiac and medical comorbidities      MRI requested to evaluate for deep joint or bone  involvement and patient is aware that if confirmed, such deep infection may not respond to antibiotics and require amputation      LE arterial studies also requested with probable consideration for Vascular consultation to evaluate potential for wound or amputation healing which is already hindered by his stasis dermatitis and edema       If / when amputation is required, may need staged guillotine follow by future closure in effort to preserve left knee joint for prosthetic fitting and minimal independent mobility      Temporizing local podiatric debridement may be considered if any acute infection while the above work up underway        Agree with broad antibiotics, heparinization cardiac stabilization while patients considers amputation with limited alternative options  History of Present Illness     HPI:  Edwardo Borden is a 79 y o  male who presents with acute on chronic diabetic foot wounds  Also with underlying obesity, CHF AFIB and neuropathy      Left heel "bruise" progressed with drainage last week and increased swelling    Chronic bilateral lymphedema and followed by SHANTANU Hsu for over 4 years      LE neuropathy with remote history of left iliofemoral? arterial stent placement      Limited mobility with difficulty to bathroom due to lower extremities as well as shortness of breath and generalized weakness      Inpatient consult to Acute Care Surgery  Consult performed by: Chris Mukherjee MD  Consult ordered by: Keerthi Payne DPM        Review of Systems    Historical Information   Past Medical History:   Diagnosis Date    [facility]congestive [facility] failure) ([facility]     COPD (chronic obstructive pulmonary disease) ([facility] )     Decubitus ulcer of heel     LAST ASSESSED [id number]    [facility]     History of varicose veins     Hypertension     Intermittent claudication (HCC)     Neuropathy     Seasonal allergies      Past Surgical History:   Procedure Laterality Date    ANGIOPLASTY      W/ FLUOROSC ANGIOGRAPGY PERIPHERAL ARTERY ADDITIONAL  LAST ASSESSED [id number]    ANGIOPLASTY / STENTING FEMORAL      TANSCATH INTRAVASCULAR STENT PLACEMENT PERCUTANEOUS FEMORAL     FULL THICKNESS SKIN GRAFT      TENDON REPAIR      TOE AMPUTATION Left 12/27/2018    Procedure: AMPUTATION left 4th TOE;  Surgeon: Ld Springer DPM;  [facility];  Service: Podiatry     Social History   Social History     Substance and Sexual Activity   Alcohol Use Not Currently     Social History     Substance and Sexual Activity   Drug Use Not Currently     Social History     Tobacco Use   Smoking Status Never Smoker   Smokeless Tobacco Never Used     Family History:   Family History   Problem Relation Age of Onset    Other Mother         CARDIAC DISORDER     Diabetes Mother     Cancer Father     Other Family         CARDIAC DISORDER     Diabetes Family     Cancer Family     Mental illness Family        Meds/Allergies   all current active meds have been reviewed  Allergies   Allergen Reactions    Latex Rash       Objective First Vitals:   Blood Pressure: 121/57 (07/25/19 1255)  Pulse: 79 (07/25/19 1255)  Temperature: 98 6 °F (37 °C) (07/25/19 1255)  Temp Source: Tympanic (07/25/19 1255)  Respirations: 20 (07/25/19 1255)  Height: 6' 3" (190 5 cm) (07/25/19 1255)  Weight - Scale: (!) 175 kg (385 lb 12 9 oz) (07/25/19 1255)  SpO2: 96 % (07/25/19 1255)    Invasive Devices     Peripheral Intravenous Line            Peripheral IV 07/25/19 Right Antecubital 3 days    Peripheral IV 07/27/19 Left;Ventral (anterior) Forearm 1 day                Physical Exam      Alert, appropriate and afebrile  Clear  Irreg  Obese, soft without tenderness or pulsatile mass  Weak irregular bilateral femoral pulses  Chronic bilateral calf and shin stasis changes and thickening with mild left erythema and larger edema  Left anterolateral ankle with dark dry eschar within dorsiflexion joint crease  Large left plantar skin sllugh with serous drainage  Plantar heel with some softness but no distinct abscess or fluctuance  S/p toe amputations  Non tender      Lab Results: I have personally reviewed pertinent lab results  Nl WBC  HgbA1c>9     Imaging: I have personally reviewed pertinent films in PACS  Left foot Xray reviewed with charcot changes  No obvious osteo  No subcutaneous gas      EKG, Pathology, and Other Studies: I have personally reviewed pertinent reports  Counseling / Coordination of Care  Total floor / unit time spent today 62 minutes  Greater than 50% of total time was spent with the patient and family counseling and or coordination of care  A description of the counseling coordination of care included lengthy discussion about probable amputation, consideration and risks with patient and family along with podiatric communications

## 2019-07-28 NOTE — PROGRESS NOTES
Progress Note - General Surgery   Fernanda Rahman 79 y o  male MRN: [id number]  Unit/Bed#: [address] [place] [address] Encounter: [id number]    Assessment:  80 yo diabetic charcot foot with left plantar draining wound and cellulitis      Chronic lower leg lymphedema and stasis dermatitis      Chronic AFIB on Coumadin with mobility limiting dyspnea and COPD      Plan:  Agree with podiatric management  Although he and his family understand that left limb salvage is unlikely, he is still hoping to avoid this if at all possible  They also understand that below knee amputation with future prosthetic fittling will be very challenging given his lymphedema stasis skin changes, poorly controlled diabetes, possible vascular insufficiency, charcot deformities and morbid obesity pressure combined with his cardiac and medical comorbidities      MRI requested to evaluate for deep joint or bone  involvement and patient is aware that if confirmed, such deep infection may not respond to antibiotics and require amputation      LE arterial studies also requested with probable consideration for Vascular consultation to evaluate potential for wound or amputation healing which is already hindered by his stasis dermatitis and edema       If / when amputation is required, may need staged guillotine follow by future closure in effort to preserve left knee joint for prosthetic fitting and minimal independent mobility      Temporizing local podiatric debridement may be considered if any acute infection while the above work up underway        Agree with broad antibiotics, heparinization cardiac stabilization while patients considers amputation with limited alternative options        Subjective/Objective   Chief Complaint: Left foot swollen and draining    Subjective:   Feels much better  Has not tried to ambulate  Objective: Sitting OOB in chair    Blood pressure 127/58, pulse 90, temperature 98 5 °F (36 9 °C), temperature source Oral, resp  rate 15, height 6' 3" (1 905 m), weight (!) 165 kg (364 lb 3 2 oz), SpO2 92 %  ,Body mass index is 45 52 kg/m²  Invasive Devices     Peripheral Intravenous Line            Peripheral IV 07/25/19 Right Antecubital 3 days    Peripheral IV 07/27/19 Left;Ventral (anterior) Forearm 1 day                Physical Exam:   Alert, appropriate and afebrile  Irreg  Obese, soft without tenderness  Chronic bilateral calf and shin stasis changes and thickening with decreasing mild left erythema and edema  Left anterolateral ankle with dark dry eschar within dorsiflexion joint crease   Large left plantar skin sllugh with serous drainage  Plantar heel with some softness but no distinct abscess or fluctuance  S/p left 4th toe amputation  Non tender      Lab, Imaging and other studies:I have personally reviewed pertinent lab results  Management reviewed with podiatry who will obtain vascular consultation

## 2019-07-28 NOTE — PLAN OF CARE
Problem: Potential for Falls  Goal: Patient will remain free of falls  Description  INTERVENTIONS:  - Assess patient frequently for physical needs  -  Identify cognitive and physical deficits and behaviors that affect risk of falls    -  [place] fall precautions as indicated by assessment   - Educate patient/family on patient safety including physical limitations  - Instruct patient to call for assistance with activity based on assessment  - Modify environment to reduce risk of injury  - Consider OT/PT consult to assist with strengthening/mobility  Outcome: Progressing

## 2019-07-29 ENCOUNTER — APPOINTMENT (INPATIENT)
Dept: MRI IMAGING | Facility: HOSPITAL | Age: 69
DRG: 616 | End: 2019-07-29
Payer: COMMERCIAL

## 2019-07-29 ENCOUNTER — APPOINTMENT (INPATIENT)
Dept: NON INVASIVE DIAGNOSTICS | Facility: HOSPITAL | Age: 69
DRG: 616 | End: 2019-07-29
Payer: COMMERCIAL

## 2019-07-29 ENCOUNTER — APPOINTMENT (INPATIENT)
Dept: RADIOLOGY | Facility: HOSPITAL | Age: 69
DRG: 616 | End: 2019-07-29
Payer: COMMERCIAL

## 2019-07-29 LAB
APTT PPP: 40 SECONDS (ref 23–37)
APTT PPP: 50 SECONDS (ref 23–37)
APTT PPP: 53 SECONDS (ref 23–37)
BASOPHILS # BLD AUTO: 0.06 THOUSANDS/ΜL (ref 0–0.1)
BASOPHILS NFR BLD AUTO: 1 % (ref 0–1)
CRP SERPL QL: >90 MG/L
EOSINOPHIL # BLD AUTO: 0.23 THOUSAND/ΜL (ref 0–0.61)
EOSINOPHIL NFR BLD AUTO: 3 % (ref 0–6)
ERYTHROCYTE [DISTWIDTH] IN BLOOD BY AUTOMATED COUNT: 15.7 % (ref 11.6–15.1)
GLUCOSE SERPL-MCNC: 139 MG/DL (ref 65–140)
GLUCOSE SERPL-MCNC: 143 MG/DL (ref 65–140)
GLUCOSE SERPL-MCNC: 174 MG/DL (ref 65–140)
GLUCOSE SERPL-MCNC: 299 MG/DL (ref 65–140)
HCT VFR BLD AUTO: 31.8 % (ref 36.5–49.3)
HGB BLD-MCNC: 9.8 G/DL (ref 12–17)
IMM GRANULOCYTES # BLD AUTO: 0.08 THOUSAND/UL (ref 0–0.2)
IMM GRANULOCYTES NFR BLD AUTO: 1 % (ref 0–2)
INR PPP: 1.47 (ref 0.84–1.19)
LYMPHOCYTES # BLD AUTO: 1.42 THOUSANDS/ΜL (ref 0.6–4.47)
LYMPHOCYTES NFR BLD AUTO: 19 % (ref 14–44)
MCH RBC QN AUTO: 27.6 PG (ref 26.8–34.3)
MCHC RBC AUTO-ENTMCNC: 30.8 G/DL (ref 31.4–37.4)
MCV RBC AUTO: 90 FL (ref 82–98)
MONOCYTES # BLD AUTO: 0.68 THOUSAND/ΜL (ref 0.17–1.22)
MONOCYTES NFR BLD AUTO: 9 % (ref 4–12)
NEUTROPHILS # BLD AUTO: 4.97 THOUSANDS/ΜL (ref 1.85–7.62)
NEUTS SEG NFR BLD AUTO: 67 % (ref 43–75)
NRBC BLD AUTO-RTO: 0 /100 WBCS
PLATELET # BLD AUTO: 352 THOUSANDS/UL (ref 149–390)
PMV BLD AUTO: 9.8 FL (ref 8.9–12.7)
PROTHROMBIN TIME: 17.5 SECONDS (ref 11.6–14.5)
RBC # BLD AUTO: 3.55 MILLION/UL (ref 3.88–5.62)
VANCOMYCIN SERPL-MCNC: 18.1 UG/ML
WBC # BLD AUTO: 7.44 THOUSAND/UL (ref 4.31–10.16)

## 2019-07-29 PROCEDURE — 85730 THROMBOPLASTIN TIME PARTIAL: CPT | Performed by: HOSPITALIST

## 2019-07-29 PROCEDURE — 99232 SBSQ HOSP IP/OBS MODERATE 35: CPT | Performed by: SURGERY

## 2019-07-29 PROCEDURE — 93923 UPR/LXTR ART STDY 3+ LVLS: CPT

## 2019-07-29 PROCEDURE — 80202 ASSAY OF VANCOMYCIN: CPT | Performed by: HOSPITALIST

## 2019-07-29 PROCEDURE — 82948 REAGENT STRIP/BLOOD GLUCOSE: CPT

## 2019-07-29 PROCEDURE — 99223 1ST HOSP IP/OBS HIGH 75: CPT | Performed by: INTERNAL MEDICINE

## 2019-07-29 PROCEDURE — 87493 C DIFF AMPLIFIED PROBE: CPT | Performed by: INTERNAL MEDICINE

## 2019-07-29 PROCEDURE — 85730 THROMBOPLASTIN TIME PARTIAL: CPT | Performed by: INTERNAL MEDICINE

## 2019-07-29 PROCEDURE — 93925 LOWER EXTREMITY STUDY: CPT | Performed by: SURGERY

## 2019-07-29 PROCEDURE — 93925 LOWER EXTREMITY STUDY: CPT

## 2019-07-29 PROCEDURE — A9585 GADOBUTROL INJECTION: HCPCS | Performed by: SURGERY

## 2019-07-29 PROCEDURE — 85025 COMPLETE CBC W/AUTO DIFF WBC: CPT | Performed by: HOSPITALIST

## 2019-07-29 PROCEDURE — 99232 SBSQ HOSP IP/OBS MODERATE 35: CPT | Performed by: INTERNAL MEDICINE

## 2019-07-29 PROCEDURE — 94640 AIRWAY INHALATION TREATMENT: CPT

## 2019-07-29 PROCEDURE — C1751 CATH, INF, PER/CENT/MIDLINE: HCPCS

## 2019-07-29 PROCEDURE — 71045 X-RAY EXAM CHEST 1 VIEW: CPT

## 2019-07-29 PROCEDURE — 93922 UPR/L XTREMITY ART 2 LEVELS: CPT | Performed by: SURGERY

## 2019-07-29 PROCEDURE — 73720 MRI LWR EXTREMITY W/O&W/DYE: CPT

## 2019-07-29 PROCEDURE — 94760 N-INVAS EAR/PLS OXIMETRY 1: CPT

## 2019-07-29 PROCEDURE — 85610 PROTHROMBIN TIME: CPT | Performed by: SURGERY

## 2019-07-29 PROCEDURE — ND001 PR NO DOCUMENTATION: Performed by: PODIATRIST

## 2019-07-29 PROCEDURE — 85730 THROMBOPLASTIN TIME PARTIAL: CPT | Performed by: NURSE PRACTITIONER

## 2019-07-29 PROCEDURE — 86140 C-REACTIVE PROTEIN: CPT | Performed by: INTERNAL MEDICINE

## 2019-07-29 PROCEDURE — 36569 INSJ PICC 5 YR+ W/O IMAGING: CPT

## 2019-07-29 RX ORDER — FUROSEMIDE 10 MG/ML
80 INJECTION INTRAMUSCULAR; INTRAVENOUS
Status: DISCONTINUED | OUTPATIENT
Start: 2019-07-29 | End: 2019-07-30

## 2019-07-29 RX ADMIN — HEPARIN SODIUM AND DEXTROSE 20 UNITS/KG/HR: 10000; 5 INJECTION INTRAVENOUS at 08:49

## 2019-07-29 RX ADMIN — FUROSEMIDE 80 MG: 10 INJECTION, SOLUTION INTRAVENOUS at 18:54

## 2019-07-29 RX ADMIN — INSULIN LISPRO 30 UNITS: 100 INJECTION, SOLUTION INTRAVENOUS; SUBCUTANEOUS at 18:55

## 2019-07-29 RX ADMIN — ATORVASTATIN CALCIUM 20 MG: 20 TABLET, FILM COATED ORAL at 08:15

## 2019-07-29 RX ADMIN — HEPARIN SODIUM 2000 UNITS: 1000 INJECTION, SOLUTION INTRAVENOUS; SUBCUTANEOUS at 00:47

## 2019-07-29 RX ADMIN — HEPARIN SODIUM AND DEXTROSE 20 UNITS/KG/HR: 10000; 5 INJECTION INTRAVENOUS at 14:14

## 2019-07-29 RX ADMIN — GADOBUTROL 16 ML: 604.72 INJECTION INTRAVENOUS at 18:08

## 2019-07-29 RX ADMIN — METRONIDAZOLE 500 MG: 500 INJECTION, SOLUTION INTRAVENOUS at 00:06

## 2019-07-29 RX ADMIN — INSULIN LISPRO 30 UNITS: 100 INJECTION, SOLUTION INTRAVENOUS; SUBCUTANEOUS at 11:38

## 2019-07-29 RX ADMIN — HEPARIN SODIUM 2000 UNITS: 1000 INJECTION, SOLUTION INTRAVENOUS; SUBCUTANEOUS at 08:40

## 2019-07-29 RX ADMIN — CEFEPIME HYDROCHLORIDE 2000 MG: 2 INJECTION, SOLUTION INTRAVENOUS at 03:39

## 2019-07-29 RX ADMIN — METRONIDAZOLE 500 MG: 500 INJECTION, SOLUTION INTRAVENOUS at 08:14

## 2019-07-29 RX ADMIN — ALBUTEROL SULFATE 2 PUFF: 90 AEROSOL, METERED RESPIRATORY (INHALATION) at 08:52

## 2019-07-29 RX ADMIN — INSULIN GLARGINE 28 UNITS: 100 INJECTION, SOLUTION SUBCUTANEOUS at 22:06

## 2019-07-29 RX ADMIN — CEFEPIME HYDROCHLORIDE 2000 MG: 2 INJECTION, SOLUTION INTRAVENOUS at 18:41

## 2019-07-29 RX ADMIN — METOPROLOL TARTRATE 25 MG: 25 TABLET, FILM COATED ORAL at 22:06

## 2019-07-29 RX ADMIN — VANCOMYCIN HYDROCHLORIDE 2000 MG: 1 INJECTION, POWDER, LYOPHILIZED, FOR SOLUTION INTRAVENOUS at 14:13

## 2019-07-29 RX ADMIN — INSULIN LISPRO 2 UNITS: 100 INJECTION, SOLUTION INTRAVENOUS; SUBCUTANEOUS at 11:38

## 2019-07-29 RX ADMIN — ALLOPURINOL 300 MG: 300 TABLET ORAL at 08:14

## 2019-07-29 RX ADMIN — FLUTICASONE FUROATE AND VILANTEROL TRIFENATATE 1 PUFF: 100; 25 POWDER RESPIRATORY (INHALATION) at 08:52

## 2019-07-29 RX ADMIN — TORSEMIDE 20 MG: 20 TABLET ORAL at 08:14

## 2019-07-29 RX ADMIN — HEPARIN SODIUM AND DEXTROSE 19.1 UNITS/KG/HR: 10000; 5 INJECTION INTRAVENOUS at 00:57

## 2019-07-29 RX ADMIN — INSULIN LISPRO 30 UNITS: 100 INJECTION, SOLUTION INTRAVENOUS; SUBCUTANEOUS at 08:17

## 2019-07-29 NOTE — PROCEDURES
Insert PICC line  Date/Time: 7/29/2019 1:00 PM  Performed by: Omar Ogden RN  Authorized by: Llewellyn Crigler, MD     Patient location:  Bedside  Other Assisting Provider: No    Consent:     Consent obtained:  Written (by physician)    Consent given by:  Patient (by physician)    Procedural risks discussed: by physician  Alternatives discussed: by physician  Universal protocol:     Procedure explained and questions answered to patient or proxy's satisfaction: yes      Relevant documents present and verified: yes      Test results available and properly labeled: yes      Radiology Images displayed and confirmed  If images not available, report reviewed: yes      Required blood products, implants, devices, and special equipment available: yes      Site/side marked: yes      Immediately prior to procedure, a time out was called: yes      Patient identity confirmed:  Verbally with patient, arm band and hospital-assigned identification number Kt Reed RN)  Pre-procedure details:     Hand hygiene: Hand hygiene performed prior to insertion      Sterile barrier technique: All elements of maximal sterile technique followed      Skin preparation:  2% chlorhexidine    Skin preparation agent: Skin preparation agent completely dried prior to procedure    Indications:     PICC line indications: long term antibiotics    Anesthesia (see MAR for exact dosages):      Anesthesia method:  Local infiltration    Local anesthetic:  Lidocaine 1% w/o epi (1ml)  Procedure details:     Location:  Basilic    Vessel type: vein      Laterality:  Right    Approach: percutaneous technique used      Patient position:  Flat    Procedural supplies:  Double lumen    Catheter size:  5 Fr    Landmarks identified: yes      Ultrasound guidance: yes      Sterile ultrasound techniques: Sterile gel and sterile probe covers were used      Number of attempts:  1    Successful placement: yes      Vessel of catheter tip end:  Chest Xray needed to confirm placement    Total catheter length (cm):  49    Catheter out on skin (cm):  0    Max flow rate:  999ml/hr    Arm circumference:  38  Post-procedure details:     Post-procedure:  Securement device placed    Assessment:  Blood return through all ports, placement verification pending x-ray result and free fluid flow    Post-procedure complications: none      Patient tolerance of procedure: Tolerated well, no immediate complications  Comments:      Right basilic PICC line placed using iCentera 3 CG technology  Awaiting CXR for tip confirmation  Notified Kasey Srinivasan RN not to use picc line until cxr confirmation  Pink do not use stickers applied to end of picc lines  STAT CXR ordered

## 2019-07-29 NOTE — CONSULTS
Consultation - Cardiology   Loyd Lopez 79 y o  male MRN: [id number]  Unit/Bed#: [address] [place] [address] Encounter: [id number]    Inpatient consult to Cardiology  Consult performed by: Aram Byrd MD  Consult ordered by: Stefany John MD          History of Present Illness   Physician Requesting Consult: Jenniffer Alfonso MD  Reason for Consult / Principal Problem: Preop    HPI: Loyd Lopez is a 79y o  year old male who has a history of persistent atrial fibrillation, chronic diastolic heart failure, non-healing diabetic foot ulcers, no history of coronary artery disease, Echo 4/19 with normal cardiac function and no significant valvular abnormalities, who follows with cardiologist Dr Franchesca Parker, presented to [facility] on 7/25 with non-healing L foot wound  Patient also with chronic LE lymphedema  He is slated to undergo either L BKA/AKA  From a cardiac standpoint, has some dyspnea and increasing LE edema, which is progressive, but no chest pain  Review of Systems   Constitution: Negative for chills, diaphoresis, fever and malaise/fatigue  HENT: Negative  Eyes: Negative  Cardiovascular: Positive for dyspnea on exertion and leg swelling  Negative for chest pain and palpitations  Respiratory: Negative for cough, shortness of breath, snoring and wheezing  Endocrine: Negative  Hematologic/Lymphatic: Negative  Skin: Negative for rash  Musculoskeletal: Negative  Gastrointestinal: Negative for abdominal pain, constipation and diarrhea  Genitourinary: Negative for bladder incontinence, dysuria and frequency  Neurological: Negative for dizziness and light-headedness  Psychiatric/Behavioral: Negative  All other systems reviewed and are negative      Historical Information   Past Medical History:   Diagnosis Date    [facility]     COPD (chronic obstructive pulmonary disease) [facility])     Decubitus ulcer of heel     LAST ASSESSED [id number]    [facility] mellitus ([facility] [address] )     History of varicose veins     Hypertension     Intermittent claudication (HCC)     Neuropathy     Seasonal allergies      Past Surgical History:   Procedure Laterality Date    ANGIOPLASTY      W/ FLUOROSC ANGIOGRAPGY PERIPHERAL ARTERY ADDITIONAL  LAST ASSESSED [id number]    ANGIOPLASTY / STENTING FEMORAL      TANSCATH INTRAVASCULAR STENT PLACEMENT PERCUTANEOUS FEMORAL     FULL THICKNESS SKIN GRAFT      TENDON REPAIR      TOE AMPUTATION Left 12/27/2018    Procedure: AMPUTATION left 4th TOE;  Surgeon: See Julio DPM;  Location:  MAIN OR;  Service: Podiatry     Social History     Substance and Sexual Activity   Alcohol Use Not Currently     Social History     Substance and Sexual Activity   Drug Use Not Currently     Social History     Tobacco Use   Smoking Status Never Smoker   Smokeless Tobacco Never Used     Family History: non-contributory    Meds/Allergies     Current Facility-Administered Medications:  acetaminophen 650 mg Oral Q6H PRN Padmini Avila MD    albuterol 2 puff Inhalation Q6H PRN Padmini Avila MD    allopurinol 300 mg Oral Daily Padmini Avila MD    atorvastatin 20 mg Oral Daily Padmini Avila MD    cefepime 2,000 mg Intravenous Q12H Padmini Avila MD Last Rate: 2,000 mg (07/29/19 0339)   fluticasone-vilanterol 1 puff Inhalation Daily Padmini Avila MD    heparin (porcine) 3-20 Units/kg/hr (Order-Specific) Intravenous Titrated Justice Reddy MD Last Rate: 20 Units/kg/hr (07/29/19 1414)   heparin (porcine) 2,000 Units Intravenous PRN Jessica Nuno MD    heparin (porcine) 4,000 Units Intravenous PRN Justice Reddy MD    insulin glargine 28 Units Subcutaneous HS Jessica Nuno MD    insulin lispro 2-12 Units Subcutaneous TID AC Padmini Avila MD    insulin lispro 30 Units Subcutaneous TID With Meals Padmini Avila MD    losartan 50 mg Oral Daily Padmini Avila MD    metoprolol tartrate 25 mg Oral Q12H [address] Padmini Avila MD spironolactone 25 mg Oral Daily Papo Tracy MD    torsemide 20 mg Oral BID Papo Tracy MD    vancomycin 2,000 mg Intravenous Q12H Papo Tracy MD Last Rate: 2,000 mg (07/29/19 1413)       heparin (porcine) 3-20 Units/kg/hr (Order-Specific) Last Rate: 20 Units/kg/hr (07/29/19 1414)     Medications Prior to Admission   Medication    albuterol (PROVENTIL HFA,VENTOLIN HFA) 90 mcg/act inhaler    allopurinol (ZYLOPRIM) 300 mg tablet    atorvastatin (LIPITOR) 20 mg tablet    BD INSULIN SYRINGE U/F 31G X 5/16" 0 5 ML MISC    BD PEN NEEDLE HILARIO U/F 32G X 4 MM MISC    fluticasone-salmeterol (ADVAIR DISKUS, WIXELA INHUB) 250-50 mcg/dose inhaler    glucose blood (ONE TOUCH ULTRA TEST) test strip    insulin aspart (NOVOLOG FLEXPEN) 100 Units/mL injection pen    Insulin Pen Needle 31G X 5 MM MISC    losartan (COZAAR) 50 mg tablet    metFORMIN (GLUCOPHAGE) 1000 MG tablet    metoprolol tartrate (LOPRESSOR) 25 mg tablet    spironolactone (ALDACTONE) 25 mg tablet    torsemide (DEMADEX) 20 mg tablet    warfarin (COUMADIN) 5 mg tablet       Allergies   Allergen Reactions    Latex Rash       Objective   Vitals: Blood pressure 105/52, pulse 68, temperature 97 6 °F (36 4 °C), temperature source Oral, resp  rate 19, height 6' 3" (1 905 m), weight (!) 166 kg (366 lb 13 5 oz), SpO2 94 %  , Body mass index is 45 85 kg/m² , Orthostatic Blood Pressures      Most Recent Value   Blood Pressure  105/52 filed at 07/29/2019 0700   Patient Position - Orthostatic VS  Lying filed at 07/29/2019 7121        Systolic (49ZTD), LWT:593 , Min:105 , OTM:357     Diastolic (15BSE), IHT:73, Min:52, Max:58      Intake/Output Summary (Last 24 hours) at 7/29/2019 1500  Last data filed at 7/29/2019 0600  Gross per 24 hour   Intake --   Output 900 ml   Net -900 ml       Weight (last 2 days)     Date/Time   Weight    07/29/19 0600   (!) 166 (366 85)    07/28/19 0600   (!) 165 (364 2)    07/27/19 0600   (!) 167 (368 61)              Invasive Devices     Peripherally Inserted Central Catheter Line            PICC Line 59/18/62 Right Basilic less than 1 day          Peripheral Intravenous Line            Peripheral IV 07/27/19 Left;Ventral (anterior) Forearm 2 days    Peripheral IV 07/29/19 Right;Ventral (anterior) Forearm less than 1 day                  Physical Exam   Constitutional: He is oriented to person, place, and time  He appears well-developed and well-nourished  HENT:   Head: Normocephalic and atraumatic  Neck: No JVD present  Cardiovascular: Normal rate and normal heart sounds  An irregularly irregular rhythm present  Exam reveals no gallop and no friction rub  No murmur heard  4+ B LE edema   Pulmonary/Chest: Effort normal and breath sounds normal  He has no wheezes  He has no rales  Abdominal: Soft  Bowel sounds are normal  He exhibits no distension  There is no tenderness  Musculoskeletal: He exhibits no edema  Neurological: He is alert and oriented to person, place, and time  He has normal reflexes  Skin: Skin is warm and dry  No rash noted  No erythema  Psychiatric: He has a normal mood and affect             Laboratory Results:        CBC with diff: Results from last 7 days   Lab Units 07/29/19  0648 07/28/19  1947 07/28/19  0341 07/27/19  0744 07/26/19  0507 07/25/19  1345   WBC Thousand/uL 7 44  --  7 00 8 60 8 06 9 15   HEMOGLOBIN g/dL 9 8* 10 8* 9 5* 10 7* 9 7* 10 3*   HEMATOCRIT % 31 8* 35 8* 30 8* 35 7* 32 3* 34 2*   MCV fL 90  --  90 91 92 91   PLATELETS Thousands/uL 352  --  313 357 301 334   MCH pg 27 6  --  27 6 27 2 27 6 27 5   MCHC g/dL 30 8*  --  30 8* 30 0* 30 0* 30 1*   RDW % 15 7*  --  15 9* 15 7* 15 8* 15 7*   MPV fL 9 8  --  9 4 9 8 9 8 9 9   NRBC AUTO /100 WBCs 0  --  0 0 0 0         CMP:  Results from last 7 days   Lab Units 07/28/19  0418 07/27/19  0744 07/26/19  0507 07/25/19  1345   POTASSIUM mmol/L 4 5 5 0 5 0 5 4*   CHLORIDE mmol/L 100 98* 104 101   CO2 mmol/L 24 27 26 30   BUN mg/dL 48* 38* 36* 36*   CREATININE mg/dL 1 52* 1 42* 1 24 1 32*   CALCIUM mg/dL 8 5 8 8 8 5 8 9   AST U/L  --   --   --  19   ALT U/L  --   --   --  24   ALK PHOS U/L  --   --   --  247*   EGFR ml/min/1 73sq m 47 51 60 56         BMP:  Results from last 7 days   Lab Units 19  0418 19  0744 19  0507 19  1345   POTASSIUM mmol/L 4 5 5 0 5 0 5 4*   CHLORIDE mmol/L 100 98* 104 101   CO2 mmol/L 24 27 26 30   BUN mg/dL 48* 38* 36* 36*   CREATININE mg/dL 1 52* 1 42* 1 24 1 32*   CALCIUM mg/dL 8 5 8 8 8 5 8 9       BNP:     Magnesium:       Coags:   Results from last 7 days   Lab Units 19  0648 19  0005 19  1437 19  0840 19  0321 19  1831 19  1047 19  1406   PTT seconds 53* 50* 49* 60* 57* 54*  --   --    INR  1 47*  --   --   --   --   --  1 72* 1 88*         Cardiac testing:   Results for orders placed during the hospital encounter of 19   Echo complete with contrast if indicated    Narrative [address] [place] [address]    Transthoracic Echocardiogram  2D, M-mode, Doppler, and Color Doppler    Study date:  2019    Patient: Iqra Wyatt  MR number: [id number]  Account number: [de-identified]  : 1950  Age: 79 years  Gender: Male  Status: Outpatient  Location: [facility]  Height: 75 in  Weight: 368 lb  BP: 110/ 60 mmHg    Indications: Shortness of breath  Diagnoses: R06 02 - Shortness of breath    Sonographer:  Mary Johnson BS, RCS  Primary Physician: Phil Coppola MD  Referring Physician:  Steff Zafar MD  Group:  [facility]  Interpreting Physician:  Teri Payan MD    SUMMARY    LEFT VENTRICLE:  Systolic function was normal  Ejection fraction was estimated to be 60 %  There were no regional wall motion abnormalities  There was mild concentric hypertrophy    Doppler parameters were consistent with a reversible restrictive pattern, indicative of decreased left ventricular diastolic compliance and/or increased left atrial pressure (grade 3 diastolic dysfunction)  RIGHT VENTRICLE:  The ventricle was mildly dilated  Systolic function was normal     LEFT ATRIUM:  The atrium was mildly dilated  RIGHT ATRIUM:  The atrium was mildly dilated  MITRAL VALVE:  There was mild annular calcification  There was trace regurgitation  HISTORY: PRIOR HISTORY: Atrial fibrillation, CHF, Hypertension, COPD, High cholesterol  PROCEDURE: The study was performed in the [facility]  This was a routine study  The transthoracic approach was used  The study included complete 2D imaging, M-mode, complete spectral Doppler, and color Doppler  Images were  obtained from the parasternal, apical, subcostal, and suprasternal notch acoustic windows  Intravenous contrast (Definity solution [1 3 ml Definity/8 7ml normal saline solution]) was administered  Intravenous contrast was administered to  opacify the left ventricle  Intravenous contrast (  8 ml Definity in NSS) was administered  Echocardiographic views were limited due to restricted patient mobility, decreased penetration, and lung interference  This was a technically  difficult study  LEFT VENTRICLE: Size was normal  Systolic function was normal  Ejection fraction was estimated to be 60 %  There were no regional wall motion abnormalities  Wall thickness was mildly increased  There was mild concentric hypertrophy  DOPPLER: Transmitral flow pattern: atrial fibrillation  Doppler parameters were consistent with a reversible restrictive pattern, indicative of decreased left ventricular diastolic compliance and/or increased left atrial pressure (grade 3  diastolic dysfunction)  Left ventricular diastolic function parameters were abnormal     RIGHT VENTRICLE: The ventricle was mildly dilated  Systolic function was normal  Wall thickness was normal     LEFT ATRIUM: The atrium was mildly dilated      RIGHT ATRIUM: The atrium was mildly dilated  MITRAL VALVE: There was mild annular calcification  Valve structure was normal  There was normal leaflet separation  DOPPLER: The transmitral velocity was within the normal range  There was no evidence for stenosis  There was trace  regurgitation  AORTIC VALVE: The valve was trileaflet  Leaflets exhibited normal thickness, normal cuspal separation, and sclerosis  DOPPLER: Transaortic velocity was within the normal range  There was no evidence for stenosis  There was no significant  regurgitation  TRICUSPID VALVE: Not well visualized  There was normal leaflet separation  DOPPLER: The transtricuspid velocity was within the normal range  There was no evidence for stenosis  There was no significant regurgitation  PULMONIC VALVE: Not well visualized  DOPPLER: The transpulmonic velocity was within the normal range  There was no significant regurgitation  PERICARDIUM: There was no pericardial effusion  The pericardium was normal in appearance  AORTA: The root exhibited normal size  SYSTEMIC VEINS: IVC: The inferior vena cava was normal in size  PULMONARY VEINS: DOPPLER: Doppler signals were not recordable in the pulmonary vein(s)      SYSTEM MEASUREMENT TABLES    2D  %FS: 38 66 %  Ao Diam: 4 17 cm  EDV(Teich): 164 53 ml  EF(Teich): 68 25 %  ESV(Teich): 52 23 ml  IVSd: 1 33 cm  LA Area: 30 96 cm2  LA Diam: 5 13 cm  LVEDV MOD A4C: 138 47 ml  LVEF MOD A4C: 69 46 %  LVESV MOD A4C: 42 3 ml  LVIDd: 5 77 cm  LVIDs: 3 54 cm  LVLd A4C: 8 57 cm  LVLs A4C: 6 97 cm  LVPWd: 1 35 cm  RA Area: 25 2 cm2  RVIDd: 4 47 cm  SV MOD A4C: 96 18 ml  SV(Teich): 112 3 ml    [employer] Accredited Echocardiography Laboratory    Prepared and electronically signed by    Cristina Magallon MD  Signed 04-Apr-2019 14:05:02       Imaging:         EKG reviewed personally: Afib [id number]  Telemetry reviewed personally: Afib    Assessment:  Principal Problem:    Type 2 diabetes mellitus with left diabetic foot ulcer ([facility] [address] )  Active Problems:    Gout    Chronic venous stasis dermatitis of both lower extremities    Essential hypertension    Chronic atrial fibrillation (HCC)    Morbid obesity (HCC)    Chronic diastolic congestive heart failure (HCC)    Moderate persistent asthma without complication      Impression:  1  Non-healing foot wound - patient is at acceptable cardiovascular risk to proceed with BKA or AKA  2  Persistent atrial fibrillation - rate controlled  Holding anticoagulation for surgery  3  CKD  4  B Lymphedema - may benefit from more aggressive diuresis  Plan:  1  Proceed with surgery  2  More aggressive diuresis

## 2019-07-29 NOTE — PROGRESS NOTES
Vancomycin Assessment    Xavier Reyes is a 79 y o  male who is currently receiving vancomycin  for skin-soft tissue infection     Relevant clinical data and objective history reviewed:  Creatinine   Date Value Ref Range Status   07/28/2019 1 52 (H) 0 60 - 1 30 mg/dL Final     Comment:     Standardized to IDMS reference method   07/27/2019 1 42 (H) 0 60 - 1 30 mg/dL Final     Comment:     Standardized to IDMS reference method   07/26/2019 1 24 0 60 - 1 30 mg/dL Final     Comment:     Standardized to IDMS reference method   01/15/2018 0 99 0 70 - 1 25 mg/dL Final     Comment:     Result Comment: For patients >52years of age, the reference limit  for Creatinine is approximately 13% higher for people  identified as -American      05/25/2017 1 31 (H) 0 70 - 1 25 mg/dL Final     Comment:     Result Comment: For patients >52years of age, the reference limit  for Creatinine is approximately 13% higher for people  identified as -American      12/09/2016 1 18 0 70 - 1 25 mg/dL Final     Comment:     Result Comment: For patients >52years of age, the reference limit  for Creatinine is approximately 13% higher for people  identified as -American         Vancomycin Rm   Date Value Ref Range Status   07/29/2019 18 1 ug/mL Final     /52 (BP Location: Left arm)   Pulse 68   Temp 97 6 °F (36 4 °C) (Oral)   Resp 19   Ht 6' 3" (1 905 m)   Wt (!) 166 kg (366 lb 13 5 oz)   SpO2 94%   BMI 45 85 kg/m²   I/O last 3 completed shifts:  In: -   Out: 2150 [Urine:2150]  Lab Results   Component Value Date/Time    BUN 48 (H) 07/28/2019 04:18 AM    BUN 27 (H) 01/23/2018 02:29 PM    WBC 7 44 07/29/2019 06:48 AM    WBC 5 16 12/23/2015 06:00 AM    HGB 9 8 (L) 07/29/2019 06:48 AM    HGB 10 6 (L) 12/23/2015 06:00 AM    HCT 31 8 (L) 07/29/2019 06:48 AM    HCT 34 5 (L) 12/23/2015 06:00 AM    MCV 90 07/29/2019 06:48 AM    MCV 91 12/23/2015 06:00 AM     07/29/2019 06:48 AM     12/23/2015 06:00 AM Temp Readings from Last 3 Encounters:   07/29/19 97 6 °F (36 4 °C) (Oral)   05/07/19 97 9 °F (36 6 °C)   04/26/19 97 5 °F (36 4 °C) (Temporal)     Vancomycin Days of Therapy: 5    Assessment/Plan  The patient is currently on vancomycin utilizing scheduled dosing  Baseline risks associated with therapy include: obesity     The patient is receiving 1500 mg q8h with the most recent vancomycin level being not at steady-state and supratherapeutic based on a goal of 15-20 (appropriate for most indications) ; therefore, after clinical evaluation will be changed to 2000 mg every 12 hours  Pharmacy will continue to follow closely for s/sx of nephrotoxicity, infusion reactions and appropriateness of therapy  BMP and CBC will be ordered per protocol  Plan for trough as patient approaches steady state, prior to the 5th  dose at approximately 12:30 on 7/31/19  Pharmacy will continue to follow the patients culture results and clinical progress daily      Nya Mcrae, Pharmacist

## 2019-07-29 NOTE — PROGRESS NOTES
Progress Note - Eleanor Soni 1950, 79 y o  male MRN: [id number]  Unit/Bed#: [address] [place] [address] Encounter: [id number]  Primary Care Provider: Ziyad Ruiz MD   Date and time admitted to hospital: 7/25/2019  1:04 PM        * Type 2 diabetes mellitus with left diabetic foot ulcer [facility])  Assessment & Plan  Lab Results   Component Value Date    HGBA1C 9 4 (H) 12/27/2018       Recent Labs     07/26/19  1106 07/26/19  1627 07/26/19  2138 07/27/19  0749   POCGLU 151* 105 64* 136     · Continue IV Vanco/Cefepime/Flagyl as per ID  · Continue Novolog/Lantus/sliding scale coverage  · Discontinue metformin  · Podiatry, ID general surgery on board  · Acute Care surgery consult by Podiatry for possible amputation  · The anterior ankle eschar is draining purulent material    · There is significant tissue loss/necrosis on the plantar mid-arch encompassing about 30% of the plantar surface  · The entire heel also demonstrates deep tissue injury  · The entire foot is warm, red and appears cellulitic  · The foot is collapsed into a rockerbottom Charcot defomity  · Further workup as per General surgery for plan for possible amputation  Chronic venous stasis dermatitis of both lower extremities  Assessment & Plan  · Chronic, POA  · podiatry/wound care  Chronic diastolic congestive heart failure [facility])  Assessment & Plan  Wt Readings from Last 3 Encounters:   07/29/19 (!) 166 kg (366 lb 13 5 oz)   07/02/19 (!) 175 kg (386 lb)   06/10/19 (!) 171 kg (378 lb)     · Continue beta-blockers/Aldactone/Demadex        Chronic atrial fibrillation (HCC)  Assessment & Plan  · Patient will be continue on heparin drip  · Hold Coumadin for anticipated surgery  · cont Lopressor  · Maintain telemetry      Essential hypertension  Assessment & Plan  · continue beta-blocker/Aldactone/Cozaar    Gout  Assessment & Plan  · continue allopurinol    Moderate persistent asthma without complication  Assessment & Plan  · cont albuterol/Advair    Morbid obesity ([facility] [address] )  Assessment & Plan  · Encourage weight loss      VTE PROPHYLAXIS: Heparin drip    EDUCATION AND DISCUSSIONS WITH PATIENT/FAMILY: Patient updated  CURRENT LENGTH OF STAY: 4     CURRENT PATIENT STATUS: Inpatient     CERTIFICATION STATEMENT: The patient will continue to require additional inpatient hospital stay due to ongoing goals of care discussion as mentioned below  PATIENT CENTERED ROUNDS: I have performed bedside rounds with nursing staff today  ESTIMATED DISCHARGE DATE: Awaiting workup for his diabetic left foot ulcer  No estimated discharge date as of today  CODE STATUS: Level 1 - Full Code      ______________________________________________________________________    SUBJECTIVE:   Patient seen and examined  He appears comfortable  Awaiting workup for possible amputation  Consult Cardiology for pre-operative clearance  Updated sister and significant other at bedside  OBJECTIVE:     Vitals:   Temp:  [97 6 °F (36 4 °C)-99 2 °F (37 3 °C)] 97 6 °F (36 4 °C)  HR:  [68-90] 68  Resp:  [15-19] 19  BP: (105-127)/(52-58) 105/52  SpO2:  [90 %-94 %] 94 %  Temp (24hrs), Av 4 °F (36 9 °C), Min:97 6 °F (36 4 °C), Max:99 2 °F (37 3 °C)  Current: Temperature: 97 6 °F (36 4 °C)    Intake/Output Summary (Last 24 hours) at 2019 1051  Last data filed at 2019 0600  Gross per 24 hour   Intake --   Output 900 ml   Net -900 ml       Physical Exam:   GENERAL: AAO x 3  Morbid obesity  HEENT: atraumatic, normocephalic  Oral mucosa moist, no icterus, pallor  PERRLA +  Neck supple, no JVD, no lymphadenopathy, no thryomegaly  CHEST: B/L breath sounds heard, occasional wheezing  CVS: S1, S2   ABDOMEN: Soft/obese/NT/BS heard  NEUROLOGICAL: CN II -XI grossly intact  No focal motor or sensory deficits  No signs of meningeal irritation or cerebellar dysfunction    EXTREMITIES:  Bilateral pitting pedal edema, grossly enlarged left lower extremity due to chronic lymphedema  There is an anterior ankle eschar draining purulent material  There is significant tissue loss / necrosis on the plantar mid-arch encompassing about 30% of the plantar surface  The entire heel also demonstrates deep tissue injury  The entire foot is warm, red and appears cellulitic  The foot is collapsed into a rockerbottom Charcot defomity      LABS:  Results for Irma Oscar (MRN [id number]) as of 7/29/2019 10:50   7/29/2019 06:48 7/29/2019 07:10   POC Glucose  143 (H)   WBC 7 44    Red Blood Cell Count 3 55 (L)    Hemoglobin 9 8 (L)    HCT 31 8 (L)    MCV 90    MCH 27 6    MCHC 30 8 (L)    RDW 15 7 (H)    Platelet Count 473    MPV 9 8    nRBC 0    Neutrophils % 67    Immat GRANS % 1    Lymphocytes Relative 19    Monocytes Relative 9    Eosinophils 3    Basophils Relative 1    Immature Grans Absolute 0 08    Absolute Neutrophils 4 97    Absolute Monocytes 0 68    Absolute Eosinophils 0 23    Basophils Absolute 0 06    Protime 17 5 (H)    INR 1 47 (H)    PTT 53 (H)    VANCOMYCIN RANDOM 18 1      Scheduled Meds[de-identified]    Current Facility-Administered Medications:  acetaminophen 650 mg Oral Q6H PRN Garen Nyhan, MD    albuterol 2 puff Inhalation Q6H PRN Garen Nyhan, MD    allopurinol 300 mg Oral Daily Garen Nyhan, MD    atorvastatin 20 mg Oral Daily Garen Nyhan, MD    cefepime 2,000 mg Intravenous Q12H Garen Nyhan, MD Last Rate: 2,000 mg (07/29/19 0339)   fluticasone-vilanterol 1 puff Inhalation Daily Garen Nyhan, MD    heparin (porcine) 3-20 Units/kg/hr (Order-Specific) Intravenous Titrated Jacy Salinas MD Last Rate: 20 Units/kg/hr (07/29/19 0849)   heparin (porcine) 2,000 Units Intravenous PRN Jessica Corral MD    heparin (porcine) 4,000 Units Intravenous PRN Jacy Salinas MD    insulin glargine 28 Units Subcutaneous HS Jessica Corral MD    insulin lispro 2-12 Units Subcutaneous TID AC Garen Nyhan, MD    insulin lispro 30 Units Subcutaneous TID With Meals Garen Nyhan, MD    losartan 50 mg Oral Daily Dario Thornton MD    metoprolol tartrate 25 mg Oral Q12H [address] [place] MD Dinora    metroNIDAZOLE 500 mg Intravenous Q8H Dario Thornton MD Last Rate: 500 mg (07/29/19 1025)   spironolactone 25 mg Oral Daily Dario Thornton MD    torsemide 20 mg Oral BID Dario Thornton MD    vancomycin 2,000 mg Intravenous Q12H Dario Thornton MD      Continuous Infusion: Heparin gtt    heparin (porcine) 3-20 Units/kg/hr (Order-Specific) Last Rate: 20 Units/kg/hr (07/29/19 2626)     PRN Meds    acetaminophen    albuterol    heparin (porcine)    heparin (porcine)    Time Spent for Care: 20 minutes  More than 50% of total time spent on counseling and coordination of care as described above  Francisco Jauregui MD  HOSPITALIST SERVICES  7/29/2019    PLEASE NOTE:  This encounter was completed utilizing the [employer] Direct Speech Voice Recognition Software  Grammatical errors, random word insertions, pronoun errors and incomplete sentences are occasional consequences of the system due to software limitations, ambient noise and hardware issues  These may be missed by proof reading prior to affixing electronic signature  Any questions or concerns about the content, text or information contained within the body of this dictation should be directly addressed to the physician for clarification  Please do not hesitate to call me directly if you have any any questions or concerns

## 2019-07-29 NOTE — PROGRESS NOTES
Notified Aida Alvarado RN, picc line in satisfactory position confirmed by CXR  PICC line good for use

## 2019-07-29 NOTE — CONSULTS
Consultation - Infectious Disease   [place] 79 y o  male MRN: [id number]  Unit/Bed#: [address] [place] [address] Encounter: [id number]      Assessment/Plan     Assessments:    Left lower extremity chronic diabetic foot with chronic venous stasis lymphedema with cellulitis  -routine wound cultures grew out MRSA and Proteus mirabilis taken on admission  -x-ray of the left foot shows no gas in the tissue and no abscess or fluid collection  -arterial and venous studies of the lower extremities currently pending  -general surgery and podiatry following  -blood cultures on admission negative 7/25    Diarrhea     Medical Hx of  Chronic atrial fibrillation  Hypertension  Gout  Morbid obesity  History of asthma  History of Charcot foot  Chronic lymphedema and venous stasis in the lower extremities bilaterally        Plan:    Recommend discontinuing Flagyl  We will continue with IV vancomycin and cefepime for coverage of MRSA and Proteus  Day 3 of Treatment,  Will order ESR and C reactive protein in the morning  We will defer MRI for osteomyelitis assessment, I agree with Podiatry and surgery's assessment at this point surgical amputation would be appropriate, the purpose of antibiotics is not take to cure chronic diabetic wound but to make sure the patient does not become septic or develops a bacteremia  Arterial and venous insufficiency studies pending:     The patient is still considering surgical intervention but at this time the patient is not giving consent for surgical intervention continue with IV antibiotics for now    Patient is complaining of watery diarrhea we will order C difficile assay today        History of Present Illness   Physician Requesting Consult: Garen Nyhan, MD  Reason for Consult / Principal Problem:  Chronic diabetic wound right lower extremity antibiotic management    HPI:       Patient is a 14-year-old  gentleman with a history of atrial fibrillation morbid obesity diabetes Charcot foot and chronic diabetic foot wounds with venous stasis and lymphedema comes into the hospital for complaints of increasing left foot pain:  The patient has dealt with these wounds on the lower extremities for a very long time  According to Podiatry the patient will require very aggressive wound care and outpatient surgical debridements and at this point the patient is moving to amputation the patient was started on IV Flagyl cefepime and vancomycin on admission blood cultures were taken and unremarkable and x-ray of the lower extremity on the left side unremarkable for abscess or gas formation infectious Disease was called for antibiotic recommendations        REVIEW OF SYSTEMS    GENERAL/CONSTITUTIONAL: The patient denies fever, fatigue, weakness, weight gain or weight loss  HEAD, EYES, EARS, NOSE AND THROAT: Eyes - The patient denies pain, redness, loss of vision, double or blurred vision, flashing lights or spots, dryness, sore throats, and hoarseness   CARDIOVASCULAR: The patient denies chest pain, irregular heartbeats, sudden changes in heartbeat or palpitation, shortness of breath, difficulty breathing at night, swollen legs RESPIRATORY: The patient denies chronic dry cough, coughing up blood, coughing up mucus and wheezing  GASTROINTESTINAL: The patient denies decreased appetite, nausea, vomiting, vomiting blood or coffee ground material, heartburn  GENITOURINARY: The patient denies difficult urination, pain or burning with urination, blood in the urine, cloudy or smoky urine, frequent need to urinate  MUSCULOSKELETAL: The patient denies arm, buttock, thigh or calf cramps  No joint or muscle pain  No muscle weakness or tenderness  No joint swelling, neck pain, back pain or major orthopedic injuries    SKIN AND BREASTS: The patient denies easy bruising, skin redness, skin rash, hives, sensitivity to sun exposure, tightness, nodules or bumps, hair loss, color changes in the hands or feet with cold, breast lump, breast pain or nipple discharge  NEUROLOGIC: The patient denies headache, dizziness, fainting, muscle spasm, loss of consciousness, sensitivity or pain in the hands and feet or memory loss  PSYCHIATRIC: The patient denies depression with thoughts of suicide, voices in ?? head telling ? ? to do things and has not been seen for psychiatric counseling or treatment  ENDOCRINE: The patient denies intolerance to hot or cold temperature, flushing, fingernail changes, increased thirst, increased salt intake or decreased sexual desire  HEMATOLOGIC/LYMPHATIC: The patient denies anemia, bleeding tendency or clotting tendency  ALLERGIC/IMMUNOLOGIC: The patient denies rhinitis, asthma, skin sensitivity, latex allergies or sensitivity              Historical Information   Past Medical History:   Diagnosis Date    CH[facility]congestive heart failure) ([facility])     COPD (chronic obstructive pulmonary disease) ([facility])     Decubitus ulcer of heel     LAST ASSESSED [id number]    Diabetes mellitus ([facility])     History of varicose veins     Hypertension     Intermittent claudication ([facility])     Neuropathy     Seasonal allergies      Past Surgical History:   Procedure Laterality Date    ANGIOPLASTY      W/ FLUOROSC ANGIOGRAPGY PERIPHERAL ARTERY ADDITIONAL  LAST ASSESSED [id number]    ANGIOPLASTY / STENTING FEMORAL      TANSCATH INTRAVASCULAR STENT PLACEMENT PERCUTANEOUS FEMORAL     FULL THICKNESS SKIN GRAFT      TENDON REPAIR      TOE AMPUTATION Left 12/27/2018    Procedure: AMPUTATION left 4th TOE;  Surgeon: Danae Carlos DPM;  Location: [facility] OR;  Service: Podiatry     Social History   Social History     Substance and Sexual Activity   Alcohol Use Not Currently     Social History     Substance and Sexual Activity   Drug Use Not Currently     Social History     Tobacco Use   Smoking Status Never Smoker   Smokeless Tobacco Never Used     Family History   Problem Relation Age of Onset    Other Mother         CARDIAC DISORDER     Diabetes Mother     Cancer Father     Other Family         CARDIAC DISORDER     Diabetes Family     Cancer Family     Mental illness Family        Meds/Allergies   MEDS:      Current Facility-Administered Medications:     acetaminophen (TYLENOL) tablet 650 mg, 650 mg, Oral, Q6H PRN, Suzan Middleton MD    albuterol (PROVENTIL HFA,VENTOLIN HFA) inhaler 2 puff, 2 puff, Inhalation, Q6H PRN, Suzan Middleton MD, 2 puff at 07/29/19 0852    allopurinol (ZYLOPRIM) tablet 300 mg, 300 mg, Oral, Daily, Suzan Middleton MD, 300 mg at 07/29/19 2289    atorvastatin (LIPITOR) tablet 20 mg, 20 mg, Oral, Daily, Suzan Middleton MD, 20 mg at 07/29/19 0815    cefepime (MAXIPIME) 2 g/50 mL dextrose IVPB, 2,000 mg, Intravenous, Q12H, Suzan Middleton MD, Last Rate: 100 mL/hr at 07/29/19 0339, 2,000 mg at 07/29/19 0339    fluticasone-vilanterol (BREO ELLIPTA) 100-25 mcg/inh inhaler 1 puff, 1 puff, Inhalation, Daily, Suzan Middleton MD, 1 puff at 07/29/19 0852    heparin (porcine) 25,000 units in 250 mL infusion (premix), 3-20 Units/kg/hr (Order-Specific), Intravenous, Titrated, Katlin Henry MD, Last Rate: 18 mL/hr at 07/29/19 0849, 20 Units/kg/hr at 07/29/19 0849    heparin (porcine) injection 2,000 Units, 2,000 Units, Intravenous, PRN, Katlin Henry MD, 2,000 Units at 07/29/19 0840    heparin (porcine) injection 4,000 Units, 4,000 Units, Intravenous, PRN, Katlin Henry MD    insulin glargine (LANTUS) subcutaneous injection 28 Units 0 28 mL, 28 Units, Subcutaneous, HS, Jessica Og MD, 28 Units at 07/28/19 2119    insulin lispro (HumaLOG) 100 units/mL subcutaneous injection 2-12 Units, 2-12 Units, Subcutaneous, TID AC, 2 Units at 07/29/19 1138 **AND** Fingerstick Glucose (POCT), , , TID AC, Suzan Middleton MD    insulin lispro (HumaLOG) 100 units/mL subcutaneous injection 30 Units, 30 Units, Subcutaneous, TID With Meals, Suzan Middleton MD, 30 Units at 07/29/19 1138    losartan (COZAAR) tablet 50 mg, 50 mg, Oral, Daily, Savanna Grewal MD, 50 mg at 07/28/19 0848    metoprolol tartrate (LOPRESSOR) tablet 25 mg, 25 mg, Oral, Q12H [facility], Savanna Grewal MD, 25 mg at 07/28/19 2119    spironolactone (ALDACTONE) tablet 25 mg, 25 mg, Oral, Daily, Savanna Grewal MD, 25 mg at 07/28/19 0848    torsemide (DEMADEX) tablet 20 mg, 20 mg, Oral, BID, Savanna Grewal MD, 20 mg at 07/29/19 3177    vancomycin (VANCOCIN) 2,000 mg in sodium chloride 0 9 % 500 mL IVPB, 2,000 mg, Intravenous, Q12H, Savanna Grewal MD    Allergies   Allergen Reactions    Latex Rash         Intake/Output Summary (Last 24 hours) at 7/29/2019 1158  Last data filed at 7/29/2019 0600  Gross per 24 hour   Intake --   Output 900 ml   Net -900 ml         Physical Exam:     GENERAL APPEARANCE: Well developed, well nourished, in no acute distress  Large Body Habitus  SKIN: Inspection of the skin reveals no rashes, ulcerations or petechiae  HEENT: The sclerae were anicteric and conjunctivae were pink and moist  Extraocular movements were intact and pupils were equal, round, and reactive to light with normal accommodation  NECK: Supple and symmetric  There was no thyroid enlargement, and no tenderness, or masses were felt  CHEST: Normal AP diameter and normal contour without any kyphoscoliosis  LUNGS: Auscultation of the lungs revealed normal breath sounds without any other adventitious sounds or rubs  CARDIOVASCULAR: There was a regular rate and rhythm without any murmurs, gallops, rubs  The carotid pulses were normal and 2+ bilaterally without bruits  ABDOMEN: Soft and nontender with normal bowel sounds  No ascites was noted  LYMPH NODES: No lymphadenopathy was appreciated in the neck, axillae or groin  MUSCULOSKELETAL: Gait was normal  There was no tenderness or effusions noted   Muscle strength and tone were normal   EXTREMITIES:  Patient has surgical dressing on the right lower extremity with lymphedema erythema and redness no acute drainage or bleeding noted at this time  NEUROLOGIC: Alert and oriented x 3  Normal affect  Gait was normal  Normal deep tendon reflexes with no pathological reflexes  Sensation to touch was normal     Invasive Devices:   Peripheral IV 07/27/19 Left;Ventral (anterior) Forearm (Active)   Site Assessment Intact 7/29/2019  9:00 AM   Dressing Type Transparent 7/29/2019  9:00 AM   Line Status Capped 7/29/2019  9:00 AM   Dressing Status Intact 7/29/2019  9:00 AM       Peripheral IV 07/29/19 Right;Ventral (anterior) Forearm (Active)   Site Assessment Clean;Dry; Intact 7/29/2019 10:52 AM   Dressing Type Transparent 7/29/2019 10:52 AM   Line Status Blood return noted; Flushed; Infusing 7/29/2019 10:52 AM   Dressing Status Clean;Dry; Intact 7/29/2019 10:52 AM           Lab Results:   No results displayed because visit has over 200 results  Culture  Lab Results   Component Value Date    BLOODCX No Growth at 72 hrs  07/25/2019    BLOODCX No Growth at 72 hrs  07/25/2019    BLOODCX No Growth After 5 Days  12/25/2018    BLOODCX No Growth After 5 Days  12/25/2018    BLOODCX No Growth After 5 Days  03/23/2018    BLOODCX No Growth After 5 Days  03/23/2018    BLOODCX No Growth After 5 Days  10/08/2017    BLOODCX No Growth After 5 Days  10/08/2017    BLOODCX No Growth After 5 Days  09/15/2016    BLOODCX No Growth After 5 Days   09/15/2016     Lab Results   Component Value Date    WOUNDCULT 4+ Growth of Proteus mirabilis (A) 07/25/2019    WOUNDCULT 4+ Growth of  07/25/2019    WOUNDCULT (A) 07/25/2019     4+ Growth of Methicillin Resistant Staphylococcus aureus    WOUNDCULT 1+ Growth of Proteus mirabilis (A) 07/25/2019    WOUNDCULT 4+ Growth of  07/25/2019    WOUNDCULT 3+ Growth of Staphylococcus aureus (A) 12/25/2018    WOUNDCULT 2+ Growth of  12/25/2018    WOUNDCULT 4+ Growth of Staphylococcus aureus 09/15/2016    WOUNDCULT 4+ Growth of Proteus mirabilis 09/15/2016    WOUNDCULT 4+ Growth of Mixed Skin Nini 09/15/2016     No results found for: URINECX  No results found for: SPUTUMCULTUR    Principal Problem:    Type 2 diabetes mellitus with left diabetic foot ulcer ([address] )  Active Problems:    Gout    Chronic venous stasis dermatitis of both lower extremities    Essential hypertension    Chronic atrial fibrillation (HCC)    Morbid obesity (HCC)    Chronic diastolic congestive heart failure (HCC)    Moderate persistent asthma without complication

## 2019-07-29 NOTE — PROGRESS NOTES
Patient not in the room  He is getting MRI of left foot now  Our recommendation is leg amputation due to significant tissue loss, severe foot deformity, and co-morbidities

## 2019-07-29 NOTE — PROGRESS NOTES
Vancomycin IV Pharmacy-to-Dose Consultation    Hanna Veras is a 79 y o  male who is currently receiving Vancomycin IV with management by the Pharmacy Consult service  Assessment/Plan:  Patient was reviewed , random level came as 22 1 will plan to start 1000 mg IV q12 when level is under 20  Pharmacy will continue to follow up      Naz Napoles, Pharmacist

## 2019-07-29 NOTE — PLAN OF CARE
Problem: Potential for Falls  Goal: Patient will remain free of falls  Description  INTERVENTIONS:  - Assess patient frequently for physical needs  -  Identify cognitive and physical deficits and behaviors that affect risk of falls    -  [place] fall precautions as indicated by assessment   - Educate patient/family on patient safety including physical limitations  - Instruct patient to call for assistance with activity based on assessment  - Modify environment to reduce risk of injury  - Consider OT/PT consult to assist with strengthening/mobility  Outcome: Progressing

## 2019-07-29 NOTE — UTILIZATION REVIEW
Initial Clinical Review    Admission: Date/Time/Statement: 7/25/19 @ 1452     Orders Placed This Encounter   Procedures    Inpatient Admission (expected length of stay for this patient Order details is greater than two midnights)     Standing Status:   Standing     Number of Occurrences:   1     Order Specific Question:   Admitting Physician     Answer:   Lashon Tejada [[id number]]     Order Specific Question:   Level of Care     Answer:   Med Surg [[id number]]     Order Specific Question:   Estimated length of stay     Answer:   More than 2 Midnights     Order Specific Question:   Certification     Answer:   I certify that inpatient services are medically necessary for this patient for a duration of greater than two midnights  See H&P and MD Progress Notes for additional information about the patient's course of treatment  ED Arrival Information     Expected Arrival Acuity Means of Arrival Escorted By Service Admission Type    - 7/25/2019 12:54 Urgent Ambulance [facility]) General Medicine Urgent    Arrival Complaint    bilateral leg infections        Chief Complaint   Patient presents with    Wound Infection     pt presents to ED via EMS from home c/o bilateral severe wounds that are draining and malodoros  Pt states they are 9/10 pain  Pt also states he is severely SOB upon any exertion to the point where he is unable to walk to the bathroom      Assessment/Plan:    79 y o  male, to ED via EMS from home,   Admitted inpt status to [facility] of care,  For treatment of leg wounds and dyspnea  Pt w/h/o b/l leg wounds (due to chronic lymphedema and venous stasis dermatitis)  that have been worsening for months,  but he has not been to the wound clinic or been seen by his PCP  Both legs are painful, have open draining areas, and are malodorous  He has also been out of his Advair inhaler and complains of SOB and dyspnea     Will admit for IVAB therapy, wound care and podiatry consults,  tight control of DM w/accucks & sliding scale insulin  For now will continue/resume  home meds for asthma and chronic diastolic CHF       3/88  Internal medicine  RLE w/chronic venous stasis dermatitis, less erythematous than yesterday, 1 to 2+ lymphedema  LLE w/chronic venous stasis dermatitis and 3-4+ lymphedema  Patient has a blister wound on the plantar aspect of his left foot  On the anterior aspect of the left ankle joint there is an ulcer with black and/necrotic tissue and no discharge      ED Triage Vitals [07/25/19 1255]   Temperature Pulse Respirations Blood Pressure SpO2   98 6 °F (37 °C) 79 20 121/57 96 %      Temp Source Heart Rate Source Patient Position - Orthostatic VS BP Location FiO2 (%)   Tympanic Monitor Sitting Right arm --      Pain Score       8          Wt Readings from Last 1 Encounters:   07/29/19 (!) 166 kg (366 lb 13 5 oz)       Wt Readings from Last 3 Encounters:   07/25/19 (!) 175 kg (385 lb 12 9 oz)   07/02/19 (!) 175 kg (386 lb)   06/10/19 (!) 171 kg (378 lb)     Additional Vital Signs:   07/25/19 2231  98 9 °F   67  18  127/57  92 %   07/25/19 1921  98 2 °F   74  20  144/80  94 %   07/25/19 1835  --  74  24  144/63  97 %   07/25/19 1415  --  85  24  142/65  rm air        Pertinent Labs/Diagnostic Test Results:   7/25  Left foot xray  -  Worsening Charcot joint at midfoot,  Worsening soft tissue swellign; Shallow ulcer plantar aspect of midfoot  COUND NOT confirm and abscess or osteomyelitis       Results from last 7 days   Lab Units 07/29/19  0648 07/28/19  1947 07/28/19  0341 07/27/19  0744 07/26/19  0507   WBC Thousand/uL 7 44  --  7 00 8 60 8 06   HEMOGLOBIN g/dL 9 8* 10 8* 9 5* 10 7* 9 7*   HEMATOCRIT % 31 8* 35 8* 30 8* 35 7* 32 3*   PLATELETS Thousands/uL 352  --  313 357 301   NEUTROS ABS Thousands/µL 4 97  --  5 02 6 57 5 92         Results from last 7 days   Lab Units 07/28/19  0418 07/27/19  0744 07/26/19  0507 07/25/19  1345   SODIUM mmol/L 136 134* 138 137   POTASSIUM mmol/L 4 5 5 0 5 0 5 4*   CHLORIDE mmol/L 100 98* 104 101   CO2 mmol/L 24 27 26 30   ANION GAP mmol/L 12 9 8 6   BUN mg/dL 48* 38* 36* 36*   CREATININE mg/dL 1 52* 1 42* 1 24 1 32*   EGFR ml/min/1 73sq m 47 51 60 56   CALCIUM mg/dL 8 5 8 8 8 5 8 9     Results from last 7 days   Lab Units 07/25/19  1345   AST U/L 19   ALT U/L 24   ALK PHOS U/L 247*   TOTAL PROTEIN g/dL 8 4*   ALBUMIN g/dL 2 7*   TOTAL BILIRUBIN mg/dL 0 80     Results from last 7 days   Lab Units 07/29/19  1122 07/29/19  0710 07/28/19  2109 07/28/19  1620 07/28/19  1129 07/28/19  0722 07/27/19  2057 07/27/19  1744 07/27/19  1713 07/27/19  1556   POC GLUCOSE mg/dl 174* 143* 158* 154* 166* 193* 191* 255* 65 65     Results from last 7 days   Lab Units 07/28/19  0418 07/27/19  0744 07/26/19  0507 07/25/19  1345   GLUCOSE RANDOM mg/dL 181* 120 102 141*         Results from last 7 days   Lab Units 07/29/19  0648 07/29/19  0005 07/28/19  1437  07/27/19  1047 07/23/19  1406   PROTIME seconds 17 5*  --   --   --  19 8* 21 3*   INR  1 47*  --   --   --  1 72* 1 88*   PTT seconds 53* 50* 49*   < >  --   --     < > = values in this interval not displayed  Results from last 7 days   Lab Units 07/25/19  1345   SED RATE mm/hour 134*         Results from last 7 days   Lab Units 07/25/19  1404 07/25/19  1351 07/25/19  1346   BLOOD CULTURE  No Growth at 72 hrs   --  No Growth at 72 hrs     GRAM STAIN RESULT   --  No polys seen*  4+ Gram positive rods*  3+ Gram negative rods*  3+ Gram positive cocci in pairs*  No polys seen*  1+ Gram positive cocci in pairs*  1+ Gram positive rods*  --    WOUND CULTURE   --  4+ Growth of Proteus mirabilis*  4+ Growth of   4+ Growth of Methicillin Resistant Staphylococcus aureus*  1+ Growth of Proteus mirabilis*  4+ Growth of   --                ED Treatment:   Medication Administration from 07/25/2019 1254 to 07/25/2019 1927       Date/Time Order Dose Route     07/25/2019 1353 sodium chloride 0 9 % bolus 1,000 mL 1,000 mL Intravenous     07/25/2019 1526 cefepime (MAXIPIME) 2 g/50 mL dextrose IVPB 2,000 mg Intravenous     07/25/2019 1800 vancomycin (VANCOCIN) 2,750 mg in sodium chloride 0 9 % 500 mL IVPB 2,750 mg Intravenous     07/25/2019 1629 metroNIDAZOLE (FLAGYL) IVPB (premix) 500 mg 500 mg Intravenous        Past Medical History:   Diagnosis Date    [facility]congestive heart failure) ([facility])     COPD (chronic obstructive pulmonary disease) ([facility])     Decubitus ulcer of heel     LAST ASSESSED [id number]    Diabetes mellitus ([facility] )     History of varicose veins     Hypertension     Intermittent claudication ([facility])     Neuropathy     Seasonal allergies      Present on Admission:   Chronic atrial fibrillation ([facility])   Chronic diastolic congestive heart failure ([facility])   Chronic venous stasis dermatitis of both lower extremities   Type 2 diabetes mellitus with left diabetic foot ulcer ([facility])   Gout   Morbid obesity ([facility] )   Essential hypertension   Moderate persistent asthma without complication      Admitting Diagnosis: Wound infection [T14  8XXA, L08 9]  Insulin dependent diabetes mellitus (HCC) [E11 9, Z79 4]  Left leg cellulitis [L03 116]  Skin ulcer of left foot with necrosis of muscle ([facility] ) [L97 523]  Age/Sex: 79 y o  male     Admission Orders:  Consult ID, podiatry;  PT/OT eval and treat;  accucks qid w/SSI ;      Current Facility-Administered Medications:  acetaminophen 650 mg Oral Q6H PRN   albuterol 2 puff Inhalation Q6H PRN   allopurinol 300 mg Oral Daily   atorvastatin 20 mg Oral Daily   cefepime 2,000 mg Intravenous Q12H   fluticasone-vilanterol 1 puff Inhalation Daily   heparin (porcine) 5,000 Units Subcutaneous Q8H [facility]   insulin lispro 2-12 Units Subcutaneous TID AC   insulin lispro 30 Units Subcutaneous TID With Meals   losartan 50 mg Oral Daily   metFORMIN 1,000 mg Oral BID With Meals metoprolol tartrate 25 mg Oral Q12H JULIEN   metroNIDAZOLE 500 mg Intravenous Q8H   spironolactone 25 mg Oral Daily   torsemide 20 mg Oral BID   vancomycin 1,500 mg Intravenous Q8H               Network Utilization Review Department  Phone: [phone]; Fax [phone]  [email or web address]  ATTENTION: Please call with any questions or concerns to [phone]  and carefully listen to the prompts so that you are directed to the right person  Send all requests for admission clinical reviews, approved or denied determinations and any other requests to fax [phone]   All voicemails are confidential

## 2019-07-29 NOTE — UTILIZATION REVIEW
Addendum to initial review:    CONSULTS:  7/27 surgical consult: pt wishes to avoid LLE amputation, limb salvage will be challenging based on lymphedema and skin changes with poorly controlled DM, vascular insufficiency, charcot deformity, and morbid obesity  Should have MRI to assess for deep infection  LE arterial studies should be done along with vascular consult  Agree with broad spectrum antbx while patient is contemplating surgery and other alternatives  7/27 podiatry consult: Weaver Ling IV L foot diabetic ulcer with significant tissue loss  Not ordering MRI, do not feel it will change case outcome  BKA/AKA might be the best option  Salvaging the foot would require multiple outpatient wound center visits, multiple surgical debridements, and imaging  Pt is unwilling to participate in outpatient care  7/29 cardio consult: irreg irreg rhythm, +4 BLE edema; needs more aggressive diuresis, is acceptable risk for BKA or AKA, ok to proceed with surgery    7/29 ID consult: LLE chronic diabetic foot with venous stasis lymphedema and cellulitis  Wound cultures growing MRSA and proteus, blood cultures negative  DC flagyl, continue IV vanco and cefepijme; check ESR and CRP in am; c/o watery diarrhea, will check for c  Diff; deferring MRI for osteomyelitis assessment, podiatry and surgery are recommending surgical amputation  Purpose of IV antbx at this time is to prevent sepsis or bacteremia, not to cure the chronic diabetic wound  PROGRESS NOTES:  7/28: MRI now ordered with LE arterial studies  7/29: anterior ankle eschar draining purulent material  Entire heel showing deep tissue injury  Entire foot is red, warm, and appears cellulitic  Foot is collapsed into a rockerbottom charcot deformity  low probability of wound healing   leads is pending to help with decision on BKA vs  AKA, continuing IV antbx    DIAGNOSTICS:   MRI L ankle/heel is pending    7/29 BLE doppler:   RIGHT LOWER LIMB:  Diffuse femoral popliteal artery occlusive disease without significant focal  stenosis noted  Ankle/Brachial index: 1 23 which is in the normal range  (Prior 1 00 )  PVR/ PPG tracings are normal   Metatarsal pressure of 230mmHg  Great toe pressure of 62mmHg, within the healing range     LEFT LOWER LIMB:  Diffuse femoral popliteal artery occlusive disease without significant focal  stenosis noted  Evidence suggestive of tib/peroneal occlusive disease; however, unable to  evaluate calf vessels due to inadequate image quality secondary to hard dry skin  with multiple ulcers/wounds  Ankle/Brachial index: 0 82 which is in the normal range  (Prior 0 86 )  PVR/ PPG tracings are dampened  Metatarsal pressure could not be obtained secondary to poor cuff fitment and  pitting edema    Great toe pressure of 67mmHg, within the healing range    7/29 PICC placed  7/29 CXR: cardiomegaly w/vascular congestion and volume overload

## 2019-07-29 NOTE — OCCUPATIONAL THERAPY NOTE
Occupational Therapy Cancellation Note      Patient Name: Justina Almazan  OJKNW'H Date: 7/29/2019      RECEIVED ORDERS + REVIEWED CHART  SPOKE WITH NRSG  PT TENT FOR SURGERY FOR BKA - PLEASE RECONSULT POST OP      Marilee Casas MS, OTR/L

## 2019-07-29 NOTE — PROGRESS NOTES
Progress Note - General Surgery   Sandy Deal 79 y o  male MRN: [id number]  Unit/Bed#: [address] [place] [address] Encounter: [id number]    Assessment:  Nonhealing diabetic wound of left foot with surrounding cellulitis and Charcot deformity  -complicated by history of uncontrolled diabetes, diabetic neuropathy, morbid obesity, and limited mobility  -patient with low probability of wound healing per Podiatry  -discussed option of lower extremity amputation  -will await LEADs (pending) to help with decision of BKA versus AKA  -patient also with significant skin changes of lower extremity which may make BKA more difficult  -continue IV antibiotics per ID  -continue LWC as ordered    Chronic lower leg lymphedema with stasis dermatitis of both lower extremities  -lymphedema of left lower extremity may complicate BKA proceduer  -continue, monitoring, LWC    Diabetes mellitus, type 2- uncontrolled  -last A1C 9 4  -encourage tight glucose control for optimal wound healing and control of infection  -continue medical management    Chronic afib on Coumadin anticoagulation  -continue hold on coumadin in anticipation for surgery, INR 1 47 today  -continue medical management    Morbid Obesity  -encourage weight loss program    CHF, COPD, HTN,   -continue current medications  -continue medical management        Subjective/Objective   Chief Complaint: lower extremity edema    Subjective: patient feeling okay  He is anxious to know results of the vascular studies  Complains of pain in left foot  Frustrated due to nonhealing wound  Denies any fevers, chills, vomiting  Admits to mild nausea  Tolerating diet  Objective:     Blood pressure 105/52, pulse 68, temperature 97 6 °F (36 4 °C), temperature source Oral, resp  rate 19, height 6' 3" (1 905 m), weight (!) 166 kg (366 lb 13 5 oz), SpO2 94 %  ,Body mass index is 45 85 kg/m²        Intake/Output Summary (Last 24 hours) at 7/29/2019 1000  Last data filed at 7/29/2019 0600  Gross per 24 hour   Intake --   Output 900 ml   Net -900 ml       Invasive Devices     Peripheral Intravenous Line            Peripheral IV 07/25/19 Right Antecubital 3 days    Peripheral IV 07/27/19 Left;Ventral (anterior) Forearm 1 day                Physical Exam:   General appearance: morbidly obese, alert and oriented, in no acute distress  Head: Normocephalic, without obvious abnormality, atraumatic, sclerae anicteric, mucous membranes moist  Neck: no JVD and supple, symmetrical, trachea midline  Lungs: clear to auscultation, no wheezes or rales, distant BS  Heart:   S1-S2 normal, no murmur  Abdomen:   obese, soft, nontender, active bowel sounds  Extremities: chronic bilateral stasis below the knee with thickening of the skin  Lymphadema lower left leg  Bilateral pitting edema with left foot more edematous than the right  Left lower extermity is warm and erythematous  Left foot wrapped with dressing  Legs Nontender to touch   Nonpalpable distal pulses b/l  Skin: Warm, dry  Nursing notes and vital signs reviewed      Lab, Imaging and other studies:  CBC:   Lab Results   Component Value Date    WBC 7 44 07/29/2019    HGB 9 8 (L) 07/29/2019    HCT 31 8 (L) 07/29/2019    MCV 90 07/29/2019     07/29/2019    MCH 27 6 07/29/2019    MCHC 30 8 (L) 07/29/2019    RDW 15 7 (H) 07/29/2019    MPV 9 8 07/29/2019    NRBC 0 07/29/2019   , CMP: No results found for: SODIUM, K, CL, CO2, ANIONGAP, BUN, CREATININE, GLUCOSE, CALCIUM, AST, ALT, ALKPHOS, PROT, BILITOT, EGFR, Coagulation:   Lab Results   Component Value Date    INR 1 47 (H) 07/29/2019     VTE Pharmacologic Prophylaxis: Heparin  VTE Mechanical Prophylaxis: sequential compression device     Cleo Sánchez PA-C

## 2019-07-30 ENCOUNTER — APPOINTMENT (INPATIENT)
Dept: ULTRASOUND IMAGING | Facility: HOSPITAL | Age: 69
DRG: 616 | End: 2019-07-30
Payer: COMMERCIAL

## 2019-07-30 PROBLEM — N17.9 ACUTE RENAL FAILURE SUPERIMPOSED ON STAGE 3 CHRONIC KIDNEY DISEASE (HCC): Status: ACTIVE | Noted: 2019-07-30

## 2019-07-30 PROBLEM — L97.523 SKIN ULCER OF LEFT FOOT WITH NECROSIS OF MUSCLE (HCC): Status: ACTIVE | Noted: 2019-07-25

## 2019-07-30 PROBLEM — N18.30 ACUTE RENAL FAILURE SUPERIMPOSED ON STAGE 3 CHRONIC KIDNEY DISEASE (HCC): Status: ACTIVE | Noted: 2019-07-30

## 2019-07-30 LAB
ANION GAP SERPL CALCULATED.3IONS-SCNC: 9 MMOL/L (ref 4–13)
APTT PPP: 60 SECONDS (ref 23–37)
APTT PPP: 65 SECONDS (ref 23–37)
BACTERIA BLD CULT: NORMAL
BACTERIA BLD CULT: NORMAL
BASOPHILS # BLD AUTO: 0.07 THOUSANDS/ΜL (ref 0–0.1)
BASOPHILS NFR BLD AUTO: 1 % (ref 0–1)
BUN SERPL-MCNC: 66 MG/DL (ref 5–25)
C DIFF TOX GENS STL QL NAA+PROBE: NORMAL
CALCIUM SERPL-MCNC: 8.3 MG/DL (ref 8.3–10.1)
CHLORIDE SERPL-SCNC: 103 MMOL/L (ref 100–108)
CO2 SERPL-SCNC: 27 MMOL/L (ref 21–32)
CREAT SERPL-MCNC: 1.85 MG/DL (ref 0.6–1.3)
EOSINOPHIL # BLD AUTO: 0.24 THOUSAND/ΜL (ref 0–0.61)
EOSINOPHIL NFR BLD AUTO: 3 % (ref 0–6)
ERYTHROCYTE [DISTWIDTH] IN BLOOD BY AUTOMATED COUNT: 15.9 % (ref 11.6–15.1)
ERYTHROCYTE [SEDIMENTATION RATE] IN BLOOD: 100 MM/HOUR (ref 0–10)
GFR SERPL CREATININE-BSD FRML MDRD: 37 ML/MIN/1.73SQ M
GLUCOSE SERPL-MCNC: 164 MG/DL (ref 65–140)
GLUCOSE SERPL-MCNC: 167 MG/DL (ref 65–140)
GLUCOSE SERPL-MCNC: 200 MG/DL (ref 65–140)
GLUCOSE SERPL-MCNC: 222 MG/DL (ref 65–140)
GLUCOSE SERPL-MCNC: 284 MG/DL (ref 65–140)
HCT VFR BLD AUTO: 32.8 % (ref 36.5–49.3)
HGB BLD-MCNC: 9.7 G/DL (ref 12–17)
IMM GRANULOCYTES # BLD AUTO: 0.18 THOUSAND/UL (ref 0–0.2)
IMM GRANULOCYTES NFR BLD AUTO: 2 % (ref 0–2)
LYMPHOCYTES # BLD AUTO: 1.57 THOUSANDS/ΜL (ref 0.6–4.47)
LYMPHOCYTES NFR BLD AUTO: 21 % (ref 14–44)
MCH RBC QN AUTO: 27.1 PG (ref 26.8–34.3)
MCHC RBC AUTO-ENTMCNC: 29.6 G/DL (ref 31.4–37.4)
MCV RBC AUTO: 92 FL (ref 82–98)
MONOCYTES # BLD AUTO: 0.72 THOUSAND/ΜL (ref 0.17–1.22)
MONOCYTES NFR BLD AUTO: 10 % (ref 4–12)
NEUTROPHILS # BLD AUTO: 4.79 THOUSANDS/ΜL (ref 1.85–7.62)
NEUTS SEG NFR BLD AUTO: 63 % (ref 43–75)
NRBC BLD AUTO-RTO: 0 /100 WBCS
PLATELET # BLD AUTO: 356 THOUSANDS/UL (ref 149–390)
PMV BLD AUTO: 10 FL (ref 8.9–12.7)
POTASSIUM SERPL-SCNC: 4.4 MMOL/L (ref 3.5–5.3)
RBC # BLD AUTO: 3.58 MILLION/UL (ref 3.88–5.62)
SODIUM SERPL-SCNC: 139 MMOL/L (ref 136–145)
WBC # BLD AUTO: 7.57 THOUSAND/UL (ref 4.31–10.16)

## 2019-07-30 PROCEDURE — 85025 COMPLETE CBC W/AUTO DIFF WBC: CPT | Performed by: HOSPITALIST

## 2019-07-30 PROCEDURE — 82948 REAGENT STRIP/BLOOD GLUCOSE: CPT

## 2019-07-30 PROCEDURE — 80048 BASIC METABOLIC PNL TOTAL CA: CPT | Performed by: HOSPITALIST

## 2019-07-30 PROCEDURE — 99232 SBSQ HOSP IP/OBS MODERATE 35: CPT | Performed by: SURGERY

## 2019-07-30 PROCEDURE — 85652 RBC SED RATE AUTOMATED: CPT | Performed by: INTERNAL MEDICINE

## 2019-07-30 PROCEDURE — 99221 1ST HOSP IP/OBS SF/LOW 40: CPT | Performed by: PHYSICIAN ASSISTANT

## 2019-07-30 PROCEDURE — 85730 THROMBOPLASTIN TIME PARTIAL: CPT | Performed by: NURSE PRACTITIONER

## 2019-07-30 PROCEDURE — 99232 SBSQ HOSP IP/OBS MODERATE 35: CPT | Performed by: INTERNAL MEDICINE

## 2019-07-30 PROCEDURE — 99232 SBSQ HOSP IP/OBS MODERATE 35: CPT | Performed by: PODIATRIST

## 2019-07-30 PROCEDURE — 76770 US EXAM ABDO BACK WALL COMP: CPT

## 2019-07-30 RX ORDER — INSULIN GLARGINE 100 [IU]/ML
14 INJECTION, SOLUTION SUBCUTANEOUS
Status: DISCONTINUED | OUTPATIENT
Start: 2019-07-30 | End: 2019-08-08

## 2019-07-30 RX ADMIN — METOPROLOL TARTRATE 25 MG: 25 TABLET, FILM COATED ORAL at 08:12

## 2019-07-30 RX ADMIN — HEPARIN SODIUM AND DEXTROSE 20 UNITS/KG/HR: 10000; 5 INJECTION INTRAVENOUS at 18:02

## 2019-07-30 RX ADMIN — INSULIN LISPRO 2 UNITS: 100 INJECTION, SOLUTION INTRAVENOUS; SUBCUTANEOUS at 08:13

## 2019-07-30 RX ADMIN — INSULIN GLARGINE 14 UNITS: 100 INJECTION, SOLUTION SUBCUTANEOUS at 22:14

## 2019-07-30 RX ADMIN — VANCOMYCIN HYDROCHLORIDE 2000 MG: 1 INJECTION, POWDER, LYOPHILIZED, FOR SOLUTION INTRAVENOUS at 00:32

## 2019-07-30 RX ADMIN — CEFEPIME HYDROCHLORIDE 2000 MG: 2 INJECTION, SOLUTION INTRAVENOUS at 16:12

## 2019-07-30 RX ADMIN — INSULIN LISPRO 6 UNITS: 100 INJECTION, SOLUTION INTRAVENOUS; SUBCUTANEOUS at 13:41

## 2019-07-30 RX ADMIN — ALLOPURINOL 300 MG: 300 TABLET ORAL at 08:12

## 2019-07-30 RX ADMIN — HEPARIN SODIUM AND DEXTROSE 20 UNITS/KG/HR: 10000; 5 INJECTION INTRAVENOUS at 05:12

## 2019-07-30 RX ADMIN — INSULIN LISPRO 30 UNITS: 100 INJECTION, SOLUTION INTRAVENOUS; SUBCUTANEOUS at 17:42

## 2019-07-30 RX ADMIN — VANCOMYCIN HYDROCHLORIDE 2000 MG: 1 INJECTION, POWDER, LYOPHILIZED, FOR SOLUTION INTRAVENOUS at 12:59

## 2019-07-30 RX ADMIN — CEFEPIME HYDROCHLORIDE 2000 MG: 2 INJECTION, SOLUTION INTRAVENOUS at 03:05

## 2019-07-30 RX ADMIN — INSULIN LISPRO 4 UNITS: 100 INJECTION, SOLUTION INTRAVENOUS; SUBCUTANEOUS at 17:41

## 2019-07-30 RX ADMIN — FLUTICASONE FUROATE AND VILANTEROL TRIFENATATE 1 PUFF: 100; 25 POWDER RESPIRATORY (INHALATION) at 08:13

## 2019-07-30 RX ADMIN — ATORVASTATIN CALCIUM 20 MG: 20 TABLET, FILM COATED ORAL at 08:12

## 2019-07-30 RX ADMIN — INSULIN LISPRO 30 UNITS: 100 INJECTION, SOLUTION INTRAVENOUS; SUBCUTANEOUS at 13:41

## 2019-07-30 NOTE — SOCIAL WORK
Continue to follow  [address] cannot accept Pt due not contract with [employer]  Met with Pt  Pt informed that [address] does not accept [employer]  Freedom of Choice given  Pt requesting [facility]  Referral sent to [facility] via [address]  Will need to obtain insurance auth for SNF prior to discharge  Will follow

## 2019-07-30 NOTE — PROGRESS NOTES
Progress Note - Infectious Disease   Andrew Mendoza 79 y o  male MRN: [id number]  Unit/Bed#: [address] [place] [address] Encounter: [id number]    Assessments:     Left lower extremity chronic diabetic foot with chronic venous stasis lymphedema with cellulitis  Most likely underlying chronic osteomyelitis  Pending left-sided above-the-knee amputation by surgery (on this Admission)  -routine wound cultures grew out MRSA and Proteus mirabilis taken on admission  -MRI of the left lower extremity from July 29th concerning for acute infectious process near the left  Cuboid and metatarsal areas  -blood cultures on admission negative 7/25        Medical Hx of  Chronic atrial fibrillation  Hypertension  Gout  Morbid obesity  History of asthma  History of Charcot foot  Chronic lymphedema and venous stasis in the lower extremities bilaterally           Plan:     Patient is on Day 4 of IV vancomycin and cefepime   CRP > 90  Surgery states there is not enough healthy tissue for a below-the-knee amputation they will opt for a above knee amputation on the left side in the patient is agreeing to surgical intervention   We will continue with intravenous antibiotics up until a couple days after the patient's surgical procedure no change of microbiology data continue current medical management         Melisa Smith DO  Infectious Disease  [facility]   Cell [phone]        Subjective: The patient denies fevers chills nausea vomiting upset stomach patient does have loose stools but is no longer watery no other acute events reported overnight    REVIEW OF SYSTEMS  GENERAL/CONSTITUTIONAL: The patient denies fever, fatigue, weakness, weight gain or weight loss    HEAD, EYES, EARS, NOSE AND THROAT: Eyes - The patient denies pain, redness, loss of vision, double or blurred vision, flashing lights or spots, dryness, sore throats, and hoarseness   CARDIOVASCULAR: The patient denies chest pain, irregular heartbeats, sudden changes in heartbeat or palpitation, shortness of breath, difficulty breathing at night, swollen legs RESPIRATORY: The patient denies chronic dry cough, coughing up blood, coughing up mucus and wheezing  GASTROINTESTINAL: The patient denies decreased appetite, nausea, vomiting, vomiting blood or coffee ground material, heartburn  GENITOURINARY: The patient denies difficult urination, pain or burning with urination, blood in the urine, cloudy or smoky urine, frequent need to urinate  MUSCULOSKELETAL: The patient denies arm, buttock, thigh or calf cramps  No joint or muscle pain  No muscle weakness or tenderness  No joint swelling, neck pain, back pain or major orthopedic injuries  SKIN AND BREASTS: The patient denies easy bruising, skin redness, skin rash, hives, sensitivity to sun exposure, tightness, nodules or bumps, hair loss, color changes in the hands or feet with cold, breast lump, breast pain or nipple discharge  NEUROLOGIC: The patient denies headache, dizziness, fainting, muscle spasm, loss of consciousness, sensitivity or pain in the hands and feet or memory loss  PSYCHIATRIC: The patient denies depression with thoughts of suicide, voices in ?? head telling ? ? to do things and has not been seen for psychiatric counseling or treatment  ENDOCRINE: The patient denies intolerance to hot or cold temperature, flushing, fingernail changes, increased thirst, increased salt intake or decreased sexual desire  HEMATOLOGIC/LYMPHATIC: The patient denies anemia, bleeding tendency or clotting tendency  ALLERGIC/IMMUNOLOGIC: The patient denies rhinitis, asthma, skin sensitivity, latex allergies or sensitivity      Objective:     Temp:  [97 9 °F (36 6 °C)-98 5 °F (36 9 °C)] 98 5 °F (36 9 °C)  HR:  [72-92] 72  Resp:  [18] 18  BP: (121-156)/(54-76) 156/65  SpO2:  [90 %-92 %] 90 %  Temp (24hrs), Av 3 °F (36 8 °C), Min:97 9 °F (36 6 °C), Max:98 5 °F (36 9 °C)  Current: Temperature: 98 5 °F (36 9 °C)    Physical Exam:     GENERAL APPEARANCE: Well developed, well nourished, in no acute distress  Large Body Habitus  SKIN: Inspection of the skin reveals no rashes, ulcerations or petechiae  HEENT: The sclerae were anicteric and conjunctivae were pink and moist  Extraocular movements were intact and pupils were equal, round, and reactive to light with normal accommodation  NECK: Supple and symmetric  There was no thyroid enlargement, and no tenderness, or masses were felt  CHEST: Normal AP diameter and normal contour without any kyphoscoliosis  LUNGS: Auscultation of the lungs revealed normal breath sounds without any other adventitious sounds or rubs  CARDIOVASCULAR: There was a regular rate and rhythm without any murmurs, gallops, rubs  The carotid pulses were normal and 2+ bilaterally without bruits  ABDOMEN: Soft and nontender with normal bowel sounds  No ascites was noted  LYMPH NODES: No lymphadenopathy was appreciated in the neck, axillae or groin  MUSCULOSKELETAL: Gait was normal  There was no tenderness or effusions noted  Muscle strength and tone were normal   EXTREMITIES:  Patient has surgical dressing on the right lower extremity with lymphedema erythema and redness no acute drainage or bleeding noted at this time  NEUROLOGIC: Alert and oriented x 3  Normal affect  Gait was normal  Normal deep tendon reflexes with no pathological reflexes   Sensation to touch was normal    Invasive Devices     Peripherally Inserted Central Catheter Line            PICC Line 95/65/30 Right Basilic 1 day          Peripheral Intravenous Line            Peripheral IV 07/27/19 Left;Ventral (anterior) Forearm 3 days    Peripheral IV 07/29/19 Right;Ventral (anterior) Forearm 1 day                  CBC w/diff  Recent Labs     07/30/19  0520   WBC 7 57   HGB 9 7*   HCT 32 8*      NEUTOPHILPCT 63   LYMPHOPCT 21   MONOPCT 10   EOSPCT 3     BMP  Recent Labs     07/30/19  0520   K 4 4      CO2 27   BUN 66* CREATININE 1 85*   CALCIUM 8 3     CMP  Recent Labs     07/30/19  0520   K 4 4      CO2 27   BUN 66*   CREATININE 1 85*   CALCIUM 8 3        labrc    Cultures:  Lab Results   Component Value Date    BLOODCX No Growth After 4 Days  07/25/2019    BLOODCX No Growth After 4 Days  07/25/2019    BLOODCX No Growth After 5 Days  12/25/2018    BLOODCX No Growth After 5 Days  12/25/2018    BLOODCX No Growth After 5 Days  03/23/2018    BLOODCX No Growth After 5 Days  03/23/2018    BLOODCX No Growth After 5 Days  10/08/2017    BLOODCX No Growth After 5 Days  10/08/2017    BLOODCX No Growth After 5 Days  09/15/2016    BLOODCX No Growth After 5 Days   09/15/2016     Lab Results   Component Value Date    WOUNDCULT 4+ Growth of Proteus mirabilis (A) 07/25/2019    WOUNDCULT 4+ Growth of  07/25/2019    WOUNDCULT (A) 07/25/2019     4+ Growth of Methicillin Resistant Staphylococcus aureus    WOUNDCULT 1+ Growth of Proteus mirabilis (A) 07/25/2019    WOUNDCULT 4+ Growth of  07/25/2019    WOUNDCULT 3+ Growth of Staphylococcus aureus (A) 12/25/2018    WOUNDCULT 2+ Growth of  12/25/2018    WOUNDCULT 4+ Growth of Staphylococcus aureus 09/15/2016    WOUNDCULT 4+ Growth of Proteus mirabilis 09/15/2016    WOUNDCULT 4+ Growth of Mixed Skin Nini 09/15/2016     No results found for: URINECX  No results found for: SPUTUMCULTUR          Current Facility-Administered Medications:     acetaminophen (TYLENOL) tablet 650 mg, 650 mg, Oral, Q6H PRN, Gregory Caruso MD    albuterol (PROVENTIL HFA,VENTOLIN HFA) inhaler 2 puff, 2 puff, Inhalation, Q6H PRN, Gregory Caruso MD, 2 puff at 07/29/19 0550    allopurinol (ZYLOPRIM) tablet 300 mg, 300 mg, Oral, Daily, Gregory Caruso MD, 300 mg at 07/30/19 3301    atorvastatin (LIPITOR) tablet 20 mg, 20 mg, Oral, Daily, Gregory Caruso MD, 20 mg at 07/30/19 0709    cefepime (MAXIPIME) 2 g/50 mL dextrose IVPB, 2,000 mg, Intravenous, Q12H, Gregory Caruso MD, Last Rate: 100 mL/hr at 07/30/19 0305, 2,000 mg at 07/30/19 0305    fluticasone-vilanterol (BREO ELLIPTA) 100-25 mcg/inh inhaler 1 puff, 1 puff, Inhalation, Daily, Masha Aguilar MD, 1 puff at 07/30/19 0813    heparin (porcine) 25,000 units in 250 mL infusion (premix), 3-20 Units/kg/hr (Order-Specific), Intravenous, Titrated, Ashley Castro MD, Last Rate: 18 mL/hr at 07/30/19 0512, 20 Units/kg/hr at 07/30/19 0512    heparin (porcine) injection 2,000 Units, 2,000 Units, Intravenous, PRN, Ashley Castro MD, 2,000 Units at 07/29/19 0840    heparin (porcine) injection 4,000 Units, 4,000 Units, Intravenous, PRN, Ashley Castro MD    insulin glargine (LANTUS) subcutaneous injection 28 Units 0 28 mL, 28 Units, Subcutaneous, HS, [facility] Shirley Beasley MD, 28 Units at 07/29/19 2206    insulin lispro (HumaLOG) 100 units/mL subcutaneous injection 2-12 Units, 2-12 Units, Subcutaneous, TID AC, 6 Units at 07/30/19 1341 **AND** Fingerstick Glucose (POCT), , , TID AC, Masha Aguilar MD    insulin lispro (HumaLOG) 100 units/mL subcutaneous injection 30 Units, 30 Units, Subcutaneous, TID With Meals, Masha Aguilar MD, 30 Units at 07/30/19 1341    metoprolol tartrate (LOPRESSOR) tablet 25 mg, 25 mg, Oral, Q12H [address], Masha Aguilar MD, 25 mg at 07/30/19 3559    vancomycin (VANCOCIN) 2,000 mg in sodium chloride 0 9 % 500 mL IVPB, 2,000 mg, Intravenous, Q12H, Masha Aguilar MD, Last Rate: 250 mL/hr at 07/30/19 1259, 2,000 mg at 07/30/19 1259    Principal Problem:    Type 2 diabetes mellitus with left diabetic foot ulcer (HCC)  Active Problems:    Gout    Chronic venous stasis dermatitis of both lower extremities    Essential hypertension    Chronic atrial fibrillation (HCC)    Morbid obesity (HCC)    Chronic diastolic congestive heart failure (HCC)    Moderate persistent asthma without complication    Acute renal failure superimposed on stage 3 chronic kidney disease ([address] )    Skin ulcer of left foot with necrosis of muscle ([facility] [address] )      Damon Short DO

## 2019-07-30 NOTE — PROGRESS NOTES
Progress Note - Hang Tam 1950, 79 y o  male MRN: [id number]  Unit/Bed#: [address] [place] [address] Encounter: [id number]  Primary Care Provider: Mango Simon MD   Date and time admitted to hospital: 7/25/2019  1:04 PM        * Type 2 diabetes mellitus with left diabetic foot ulcer [facility])  Assessment & Plan  Lab Results   Component Value Date    HGBA1C 9 4 (H) 12/27/2018       Recent Labs     07/26/19  1106 07/26/19  1627 07/26/19  2138 07/27/19  0749   POCGLU 151* 105 64* 136     · Continue IV Vanco/Cefepime as per ID  · Continue Novolog/Lantus/sliding scale coverage  · Podiatry, ID general surgery on board  · The anterior ankle eschar is draining purulent material    · There is significant tissue loss/necrosis on the plantar mid-arch encompassing about 30% of the plantar surface  · The entire heel also demonstrates deep tissue injury  · The entire foot is warm, red and appears cellulitic  · The foot is collapsed into a rockerbottom Charcot defomity  · Discussed with General surgery -- for AKA tomorrow  · MRI left foot reviewed  Chronic venous stasis dermatitis of both lower extremities  Assessment & Plan  · Chronic, POA  · podiatry/wound care  Chronic diastolic congestive heart failure [facility])  Assessment & Plan  Wt Readings from Last 3 Encounters:   07/29/19 (!) 166 kg (366 lb 13 5 oz)   07/02/19 (!) 175 kg (386 lb)   06/10/19 (!) 171 kg (378 lb)     · Continue beta-blockers/Aldactone/Demadex        Chronic atrial fibrillation (HCC)  Assessment & Plan  · Patient will be continue on heparin drip  · Hold Coumadin for anticipated surgery  · Continue Lopressor, heparin drip  · Appreciate input from Cardiology      Essential hypertension  Assessment & Plan  · continue beta-blocker/Aldactone/Cozaar    Gout  Assessment & Plan  · continue allopurinol    Moderate persistent asthma without complication  Assessment & Plan  · cont albuterol/Advair    Morbid obesity ([address] )  Assessment & Plan  · Encourage weight loss    Acute renal failure superimposed on stage 3 chronic kidney disease ([facility] [address] )  Assessment & Plan  · Bump in creatinine today at 1 85, most likely from aggressive IV diuresis, episodes of hypotension  · Baseline creatinine 1 1-1 3  · Will hold IV Lasix, spironolactone and lisinopril  · BMP tomorrow, creatinine continues to be elevated consult Nephrology  · Obtain ultrasound renals  VTE PROPHYLAXIS: Heparin drip    EDUCATION AND DISCUSSIONS WITH PATIENT/FAMILY: Patient updated  CURRENT LENGTH OF STAY: 5     CURRENT PATIENT STATUS: Inpatient     CERTIFICATION STATEMENT: The patient will continue to require additional inpatient hospital stay due to ongoing goals of care discussion as mentioned below  PATIENT CENTERED ROUNDS: I have performed bedside rounds with nursing staff today  ESTIMATED DISCHARGE DATE: Awaiting workup for his diabetic left foot ulcer  No estimated discharge date  CODE STATUS: Level 1 - Full Code      ______________________________________________________________________    SUBJECTIVE:   Patient seen and examined earlier today  He appears comfortable  Plan for AKA as per Dr De La Fuente Bras  Serum creatinine at 1 85, secondary to aggressive IV diuresis and episodes of hypotension  OBJECTIVE:     Vitals:   Temp:  [97 9 °F (36 6 °C)-98 9 °F (37 2 °C)] 97 9 °F (36 6 °C)  HR:  [] 75  Resp:  [18] 18  BP: (121-149)/(54-76) 131/76  SpO2:  [90 %-94 %] 92 %  Temp (24hrs), Av 4 °F (36 9 °C), Min:97 9 °F (36 6 °C), Max:98 9 °F (37 2 °C)  Current: Temperature: 97 9 °F (36 6 °C)    Intake/Output Summary (Last 24 hours) at 2019 0850  Last data filed at 2019 1747  Gross per 24 hour   Intake --   Output 850 ml   Net -850 ml       Physical Exam:   GENERAL: AAO x 3  Morbid obesity  HEENT: atraumatic, normocephalic  Oral mucosa moist, no icterus, pallor  PERRLA +  Neck supple, no JVD, no lymphadenopathy, no thryomegaly    CHEST: B/L breath sounds heard, occasional wheezing  CVS: S1, S2   ABDOMEN: Soft/obese/NT/BS heard  NEUROLOGICAL: CN II -XI grossly intact  No focal motor or sensory deficits  No signs of meningeal irritation or cerebellar dysfunction  EXTREMITIES:  Bilateral pitting pedal edema, grossly enlarged left lower extremity due to chronic lymphedema  There is an anterior ankle eschar draining purulent material  There is significant tissue loss / necrosis on the plantar mid-arch encompassing about 30% of the plantar surface  The entire heel also demonstrates deep tissue injury  The entire foot is warm, red and appears cellulitic  The foot is collapsed into a rockerbottom Charcot defomity      LABS:  Results for Chun Caceres (MRN [id number]) as of 7/30/2019 08:48   7/30/2019 05:20 7/30/2019 08:02   POC Glucose  200 (H)   Sodium 139    Chloride 103    CO2 27    Anion Gap 9    BUN 66 (H)    Creatinine 1 85 (H)    Glucose, Random 164 (H)    Calcium 8 3    eGFR 37    WBC 7 57    Red Blood Cell Count 3 58 (L)    Hemoglobin 9 7 (L)    HCT 32 8 (L)    MCV 92    MCH 27 1    MCHC 29 6 (L)    RDW 15 9 (H)    Platelet Count 255    MPV 10 0    nRBC 0    Neutrophils % 63    Immat GRANS % 2    Lymphocytes Relative 21    Monocytes Relative 10    Eosinophils 3    Basophils Relative 1    Immature Grans Absolute 0 18    Absolute Neutrophils 4 79    Lymphocytes Absolute 1 57    Absolute Monocytes 0 72    Absolute Eosinophils 0 24    Basophils Absolute 0 07    Sed Rate 100 (H)      Scheduled Meds[de-identified]    Current Facility-Administered Medications:  acetaminophen 650 mg Oral Q6H PRN Abisai Merchant MD    albuterol 2 puff Inhalation Q6H PRN Abisai Merchant MD    allopurinol 300 mg Oral Daily Abisai Merchant MD    atorvastatin 20 mg Oral Daily Abisai Merchant MD    cefepime 2,000 mg Intravenous Q12H Abisai Merchant MD Last Rate: 2,000 mg (07/30/19 0305)   fluticasone-vilanterol 1 puff Inhalation Daily Abisai Mrechant MD    heparin (porcine) 3-20 Units/kg/hr (Order-Specific) Intravenous Titrated Charo Agosto MD Last Rate: 20 Units/kg/hr (07/30/19 0512)   heparin (porcine) 2,000 Units Intravenous PRN Jessica Madison MD    heparin (porcine) 4,000 Units Intravenous PRN Charo Agosto MD    insulin glargine 28 Units Subcutaneous HS Jessica Madison MD    insulin lispro 2-12 Units Subcutaneous TID AC Janie Wetzel MD    insulin lispro 30 Units Subcutaneous TID With Meals Janie Wetzel MD    metoprolol tartrate 25 mg Oral Q12H [address] Janie Wetzel MD    vancomycin 2,000 mg Intravenous Q12H Janie Wetzel MD Last Rate: 2,000 mg (07/30/19 0032)     Continuous Infusion: Heparin gtt    heparin (porcine) 3-20 Units/kg/hr (Order-Specific) Last Rate: 20 Units/kg/hr (07/30/19 0512)     PRN Meds    acetaminophen    albuterol    heparin (porcine)    heparin (porcine)    Time Spent for Care: 20 minutes  More than 50% of total time spent on counseling and coordination of care as described above  Jovanni Burroughs MD  HOSPITALIST SERVICES  7/30/2019    PLEASE NOTE:  This encounter was completed utilizing the [employer] Direct Speech Voice Recognition Software  Grammatical errors, random word insertions, pronoun errors and incomplete sentences are occasional consequences of the system due to software limitations, ambient noise and hardware issues  These may be missed by proof reading prior to affixing electronic signature  Any questions or concerns about the content, text or information contained within the body of this dictation should be directly addressed to the physician for clarification  Please do not hesitate to call me directly if you have any any questions or concerns

## 2019-07-30 NOTE — CONSULTS
Consultation - Vascular Surgery   Javi To 79 y o  male MRN: [id number]  Unit/Bed#: [address] [place] [address] Encounter: [id number]      Assessment/Plan      Assessment:  Non-healing diabetic ulcer, left foot with surrounding cellulitis  With charcot deformity, uncontrolled DM, Morbid obesity  MRI without osteo, however podiatry feels foot is unsalvageable due to tissue loss  Gen surg: not enough healthy skin to heal BKA, will proceed with AKA  LEADS LLE without significant focal blockage, STALIN 0 82 no significant change from prior  Difficult to palpate but present femoral pulse on the left, would be able to heal BKA  Continue IV abx per ID    Diabetes type two - uncontrolled  Hgb A1c 9 4 on admission   Will need tight control to facilitate healing    Chronic Atrial fibrillation on coumadin  Morbid obesity  CHF  COPD  HTN  CKD      History of Present Illness   Physician Requesting Consult: Nathalie Briseno MD  Reason for Consult / Principal Problem: non-healing dm foot wound  HPI: Javi To is a 79y o  year old male who presents with non-healing foot wound with foul smelling oder  He reports the wound on his left foot has been present for the past 5 years  He has followed with various podiatrists in the past for this and has had it at various stages of healing  He has a history of multiple comorbidities including uncontrolled diabetes with charcot deformity of the left foot, morbid obesity, copd, CHF, atrial fibrillation on coumadin, severe venous stasis disease with significant lymphedema and skin thickening of the bilateral lower extremities  Inpatient consult to Vascular Surgery  Consult performed by: Ismael Underwood PA-C  Consult ordered by: Lilia Sánchez PA-C          Review of Systems   Constitutional: Negative  HENT: Negative  Eyes: Negative  Respiratory: Negative  Cardiovascular: Negative  Gastrointestinal: Negative  Genitourinary: Negative      Musculoskeletal: Negative  Skin: Positive for wound (see hpi)  Neurological: Negative  Historical Information   Past Medical History:   Diagnosis Date    CHF (congestive heart failure) (HCC)     [facility])     Decubitus ulcer of heel     LAST ASSESSED [id number]    Diabetes mellitus ([facility] [address] )     History of varicose veins     Hypertension     Intermittent claudication (HCC)     Neuropathy     Seasonal allergies      Past Surgical History:   Procedure Laterality Date    ANGIOPLASTY      W/ FLUOROSC ANGIOGRAPGY PERIPHERAL ARTERY ADDITIONAL  LAST ASSESSED [id number]    ANGIOPLASTY / STENTING FEMORAL      TANSCATH INTRAVASCULAR STENT PLACEMENT PERCUTANEOUS FEMORAL     FULL THICKNESS SKIN GRAFT      TENDON REPAIR      TOE AMPUTATION Left 12/27/2018    Procedure: AMPUTATION left 4th TOE;  Surgeon: Sal Self DPM;  Location:  MAIN OR;  Service: Podiatry     Social History   Social History     Substance and Sexual Activity   Alcohol Use Not Currently     Social History     Substance and Sexual Activity   Drug Use Not Currently     Social History     Tobacco Use   Smoking Status Never Smoker   Smokeless Tobacco Never Used     Family History: non-contributory}    Meds/Allergies   PTA meds:   Prior to Admission Medications   Prescriptions Last Dose Informant Patient Reported? Taking?    BD INSULIN SYRINGE U/F 31G X 5/16" 0 5 ML MISC  Self Yes No   Sig: USE AS DIRECTED WITH NOVOLIN 70/30   BD PEN NEEDLE HILARIO U/F 32G X 4 MM MISC  Self No No   Sig: by Other route 3 (three) times a day   Insulin Pen Needle 31G X 5 MM MISC  Self No No   Sig: by Does not apply route 2 (two) times a day for 50 days   albuterol (PROVENTIL HFA,VENTOLIN HFA) 90 mcg/act inhaler  Self No No   Sig: Inhale 2 puffs every 6 (six) hours as needed for wheezing   allopurinol (ZYLOPRIM) 300 mg tablet  Self No No   Sig: TAKE 1 TABLET BY MOUTH DAILY   atorvastatin (LIPITOR) 20 mg tablet  Self No No   Sig: TAKE 1 TABLET BY MOUTH ONCE DAILY   fluticasone-salmeterol (ADVAIR DISKUS, WIXELA INHUB) 250-50 mcg/dose inhaler   No No   Sig: Inhale 1 puff 2 (two) times a day Rinse mouth after use  glucose blood (ONE TOUCH ULTRA TEST) test strip  Self No No   Sig: Test TID  DX:  E11 9   insulin aspart (NOVOLOG FLEXPEN) 100 Units/mL injection pen   No No   Sig: Inject 30 Units under the skin 3 (three) times a day with meals   losartan (COZAAR) 50 mg tablet  Self No No   Sig: TAKE 1 TABLET BY MOUTH EVERY DAY   metFORMIN (GLUCOPHAGE) 1000 MG tablet   No No   Sig: TAKE 1 TABLET BY MOUTH TWICE A DAY WITH MEALS   metoprolol tartrate (LOPRESSOR) 25 mg tablet  Self No No   Sig: TAKE 1 TABLET BY MOUTH EVERY 12 HOURS   spironolactone (ALDACTONE) 25 mg tablet   No No   Sig: TAKE 1 TABLET BY MOUTH EVERY DAY   torsemide (DEMADEX) 20 mg tablet  Self No No   Sig: TAKE 1 TABLET BY MOUTH TWICE A DAY   warfarin (COUMADIN) 5 mg tablet  Self No No   Sig: TAKE 1-2 TABLETS DAILY OR AS DIRECTED BY MD       Facility-Administered Medications: None     Allergies   Allergen Reactions    Latex Rash       Objective   Vitals: Blood pressure 131/76, pulse 75, temperature 97 9 °F (36 6 °C), temperature source Oral, resp  rate 18, height 6' 3" (1 905 m), weight (!) 166 kg (365 lb 4 8 oz), SpO2 92 %  ,Body mass index is 45 66 kg/m²  Intake/Output Summary (Last 24 hours) at 7/30/2019 1439  Last data filed at 7/30/2019 1000  Gross per 24 hour   Intake 1027 09 ml   Output 850 ml   Net 177 09 ml     Invasive Devices     Peripherally Inserted Central Catheter Line            PICC Line 00/42/89 Right Basilic 1 day          Peripheral Intravenous Line            Peripheral IV 07/27/19 Left;Ventral (anterior) Forearm 2 days    Peripheral IV 07/29/19 Right;Ventral (anterior) Forearm 1 day                Physical Exam   Constitutional: He appears well-developed and well-nourished  He is active and cooperative  obese   HENT:   Head: Normocephalic and atraumatic     Eyes: Pupils are equal, round, and reactive to light  EOM are normal    Neck: Normal range of motion  Neck supple  Cardiovascular: An irregularly irregular rhythm present  Exam reveals no S3 and no S4  No murmur heard  Pulses:       Femoral pulses are 1+ on the right side, and 1+ on the left side  Pulmonary/Chest: Effort normal  He has no wheezes  He has no rales  Abdominal: Soft  Bowel sounds are normal  There is no tenderness  There is no guarding  Significant yeast accumulation in bilateral groins    Neurological: He is alert  Skin:   See pictures                     Lab Results:   CBC with diff:   Lab Results   Component Value Date    WBC 7 57 07/30/2019    HGB 9 7 (L) 07/30/2019    HCT 32 8 (L) 07/30/2019    MCV 92 07/30/2019     07/30/2019    MCH 27 1 07/30/2019    MCHC 29 6 (L) 07/30/2019    RDW 15 9 (H) 07/30/2019    MPV 10 0 07/30/2019    NRBC 0 07/30/2019   , BMP/CMP:   Lab Results   Component Value Date    SODIUM 139 07/30/2019    K 4 4 07/30/2019     07/30/2019    CO2 27 07/30/2019    BUN 66 (H) 07/30/2019    CREATININE 1 85 (H) 07/30/2019    CALCIUM 8 3 07/30/2019    EGFR 37 07/30/2019   , Coags:   Lab Results   Component Value Date    PTT 60 (H) 07/30/2019     Imaging Studies: I have personally reviewed pertinent reports  EKG, Pathology, and Other Studies: I have personally reviewed pertinent reports

## 2019-07-30 NOTE — PROGRESS NOTES
Progress Note - Podiatry  Barb Melendez 79 y o  male MRN: [id number]  Unit/Bed#: [address] [place] [address] [id number] Encounter: [id number]    ASSESSMENT:  1  Necrotic ulcer left plantar foot   2  Cellulitis LLE  3  Charcot neuroarthropathy left foot  4  Type II diabetes with neuropathy - uncontrolled  5  Venous stasis BLE  6  Lymphedema BLE      PLAN:  Pt examined at the bedside  MRI was reviewed and the findings were discussed  Lengthy conversation was were carried to discuss his condition, prognosis, and treatment options  Foot is non-salvageable regardless of presence of osteomyelitis or not at this time and recommended leg amputation  He is agreeable to leg amputation  Awaits amputation per general surgery  Wet betadine to left foot ulcer daily until the surgery  Subjective/Objective   Chief Complaint:   Chief Complaint   Patient presents with    Wound Infection     pt presents to ED via EMS from home c/o bilateral severe wounds that are draining and malodoros  Pt states they are 9/10 pain  Pt also states he is severely SOB upon any exertion to the point where he is unable to walk to the bathroom        Subjective: 79 y o  y/o male was seen and evaluated at bedside for left foot cellulitis/ ulcer  He feels well with no constitutional signs of infection  No acute event overnight  No foot pain  Review of Systems  Review of Systems   Constitutional: Negative for chills and fever  Respiratory: Negative for shortness of breath  Cardiovascular: Negative for chest pain  Gastrointestinal: Negative for diarrhea, nausea and vomiting  Skin: Positive for wound              Past Medical History  Past Medical History:   Diagnosis Date    [facility]     COPD (chronic obstructive pulmonary disease) ([facility]     Decubitus ulcer of heel     LAST ASSESSED [id number]    Diabetes mellitus ([facility] [address] )     History of varicose veins     Hypertension     [facility]     Neuropathy     Seasonal allergies        Past Surgical History  Past Surgical History:   Procedure Laterality Date    ANGIOPLASTY      W/ FLUOROSC ANGIOGRAPGY PERIPHERAL ARTERY ADDITIONAL  LAST ASSESSED [id number]    ANGIOPLASTY / STENTING FEMORAL      TANSCATH INTRAVASCULAR STENT PLACEMENT PERCUTANEOUS FEMORAL     FULL THICKNESS SKIN GRAFT      TENDON REPAIR      TOE AMPUTATION Left 12/27/2018    Procedure: AMPUTATION left 4th TOE;  Surgeon: Erinn Guerrero DPM;  Location: [facility];  Service: Podiatry        Allergies:  Latex    Medications:  Current Facility-Administered Medications   Medication Dose Route Frequency Provider Last Rate Last Dose    acetaminophen (TYLENOL) tablet 650 mg  650 mg Oral Q6H PRN Dee Abel MD        albuterol (PROVENTIL HFA,VENTOLIN HFA) inhaler 2 puff  2 puff Inhalation Q6H PRN Dee Abel MD   2 puff at 07/29/19 0852    allopurinol (ZYLOPRIM) tablet 300 mg  300 mg Oral Daily Dee Abel MD   300 mg at 07/30/19 6954    atorvastatin (LIPITOR) tablet 20 mg  20 mg Oral Daily Dee bAel MD   20 mg at 07/30/19 5578    cefepime (MAXIPIME) 2 g/50 mL dextrose IVPB  2,000 mg Intravenous Q12H Dee Abel  mL/hr at 07/30/19 0305 2,000 mg at 07/30/19 0305    fluticasone-vilanterol (BREO ELLIPTA) 100-25 mcg/inh inhaler 1 puff  1 puff Inhalation Daily Dee Abel MD   1 puff at 07/30/19 0813    heparin (porcine) 25,000 units in 250 mL infusion (premix)  3-20 Units/kg/hr (Order-Specific) Intravenous Titrated Debby Villeda MD 18 mL/hr at 07/30/19 0512 20 Units/kg/hr at 07/30/19 0512    heparin (porcine) injection 2,000 Units  2,000 Units Intravenous PRN Debby Villeda MD   2,000 Units at 07/29/19 0840    heparin (porcine) injection 4,000 Units  4,000 Units Intravenous PRN Debby Villeda MD        insulin glargine (LANTUS) subcutaneous injection 28 Units 0 28 mL  28 Units Subcutaneous HS Debby Villeda MD   28 Units at 07/29/19 7257    insulin lispro (HumaLOG) 100 units/mL subcutaneous injection 2-12 Units  2-12 Units Subcutaneous TID [facility] Marium Mayo MD   2 Units at 07/30/19 0813    insulin lispro (HumaLOG) 100 units/mL subcutaneous injection 30 Units  30 Units Subcutaneous TID With Meals Marium Mayo MD   30 Units at 07/29/19 1855    metoprolol tartrate (LOPRESSOR) tablet 25 mg  25 mg Oral Q12H [address] Marium Mayo MD   25 mg at 07/30/19 7633    vancomycin (VANCOCIN) 2,000 mg in sodium chloride 0 9 % 500 mL IVPB  2,000 mg Intravenous Q12H Marium Mayo  mL/hr at 07/30/19 0032 2,000 mg at 07/30/19 0032       Social History:  Social History     Socioeconomic History    Marital status:      Spouse name: None    Number of children: 1    Years of education: None    Highest education level: None   Occupational History    Occupation: RETIRED   Social Needs    Financial resource strain: None    Food insecurity:     Worry: None     Inability: None    Transportation needs:     Medical: None     Non-medical: None   Tobacco Use    Smoking status: Never Smoker    Smokeless tobacco: Never Used   Substance and Sexual Activity    Alcohol use: Not Currently    Drug use: Not Currently    Sexual activity: Not Currently   Lifestyle    Physical activity:     Days per week: None     Minutes per session: None    Stress: None   Relationships    Social connections:     Talks on phone: None     Gets together: None     Attends Orthodoxy service: None     Active member of club or organization: None     Attends meetings of clubs or organizations: None     Relationship status: None    Intimate partner violence:     Fear of current or ex partner: None     Emotionally abused: None     Physically abused: None     Forced sexual activity: None   Other Topics Concern    None   Social History Narrative    DENIED [address]    FEELS SAFE AT HOME LIVES WITH FAMILY - SISTER    NO LIVING WILL    POA IN EXISTENCE     SUPPORTIVE AND SAFE    One son, 2 granddaughters          Blood pressure 131/76, pulse 75, temperature 97 9 °F (36 6 °C), temperature source Oral, resp  rate 18, height 6' 3" (1 905 m), weight (!) 166 kg (365 lb 4 8 oz), SpO2 92 %  ,Body mass index is 45 66 kg/m²  Invasive Devices     Peripherally Inserted Central Catheter Line            PICC Line 62/54/44 Right Basilic less than 1 day          Peripheral Intravenous Line            Peripheral IV 07/27/19 Left;Ventral (anterior) Forearm 2 days    Peripheral IV 07/29/19 Right;Ventral (anterior) Forearm less than 1 day                Physical Exam:   General: Alert, cooperative and no distress  Lungs: Non labored breathing  Heart: RRR  Normal heart rate  Abdomen: Soft, non-tender  Neurologic:  No focal neurologic deficit  Sensation is absent to monofilament  Extremity: Generalized erythema noted LLE with edema  Skin: Large necrotic wound on left plantar foot and anterior ankle with Charcot deformity  Severe edema LLE with venous stasis  Vascular:  Non-palpable pedal pulses  CRT less than 3 seconds  Lab, Imaging and other studies:   I have personally reviewed pertinent lab results  Imaging: I have personally reviewed pertinent films in PACS  EKG, Pathology, and Other Studies: I have personally reviewed pertinent reports

## 2019-07-30 NOTE — ASSESSMENT & PLAN NOTE
· Creatinine 1 88 today from baseline of 1 2-14  · Nephrology consultation  · Ultrasound renals = No evidence of nephrolithiasis or hydronephrosis  Stable left renal cyst measuring 1 3 cm  Unremarkable bladder    · Bladder scan and PVRs  · Avoid hypotension/NSAIDs

## 2019-07-30 NOTE — PLAN OF CARE
Problem: Potential for Falls  Goal: Patient will remain free of falls  Description  INTERVENTIONS:  - Assess patient frequently for physical needs  -  Identify cognitive and physical deficits and behaviors that affect risk of falls    -  [place] fall precautions as indicated by assessment   - Educate patient/family on patient safety including physical limitations  - Instruct patient to call for assistance with activity based on assessment  - Modify environment to reduce risk of injury  - Consider OT/PT consult to assist with strengthening/mobility  Outcome: Progressing     Problem: Prexisting or High Potential for Compromised Skin Integrity  Goal: Skin integrity is maintained or improved  Description  INTERVENTIONS:  - Identify patients at risk for skin breakdown  - Assess and monitor skin integrity  - Assess and monitor nutrition and hydration status  - Monitor labs (i e  albumin)  - Assess for incontinence   - Turn and reposition patient  - Assist with mobility/ambulation  - Relieve pressure over bony prominences  - Avoid friction and shearing  - Provide appropriate hygiene as needed including keeping skin clean and dry  - Evaluate need for skin moisturizer/barrier cream  - Collaborate with interdisciplinary team (i e  Nutrition, Rehabilitation, etc )   - Patient/family teaching  Outcome: Progressing     Problem: SKIN/TISSUE INTEGRITY - ADULT  Goal: Skin integrity remains intact  Description  INTERVENTIONS  - Identify patients at risk for skin breakdown  - Assess and monitor skin integrity  - Assess and monitor nutrition and hydration status  - Monitor labs (i e  albumin)  - Assess for incontinence   - Turn and reposition patient  - Assist with mobility/ambulation  - Relieve pressure over bony prominences  - Avoid friction and shearing  - Provide appropriate hygiene as needed including keeping skin clean and dry  - Evaluate need for skin moisturizer/barrier cream  - Collaborate with interdisciplinary team (i e  Nutrition, Rehabilitation, etc )   - Patient/family teaching  Outcome: Progressing  Goal: Incision(s), wounds(s) or drain site(s) healing without S/S of infection  Description  INTERVENTIONS  - Assess and document risk factors for skin impairment   - Assess and document dressing, incision, wound bed, drain sites and surrounding tissue  - Initiate Nutrition services consult and/or wound management as needed  Outcome: Progressing     Problem: MUSCULOSKELETAL - ADULT  Goal: Maintain or return mobility to safest level of function  Description  INTERVENTIONS:  - Assess patient's ability to carry out ADLs; assess patient's baseline for ADL function and identify physical deficits which impact ability to perform ADLs (bathing, care of mouth/teeth, toileting, grooming, dressing, etc )  - Assess/evaluate cause of self-care deficits   - Assess range of motion  - Assess patient's mobility; develop plan if impaired  - Assess patient's need for assistive devices and provide as appropriate  - Encourage maximum independence but intervene and supervise when necessary  - Involve family in performance of ADLs  - Assess for home care needs following discharge   - Request OT consult to assist with ADL evaluation and planning for discharge  - Provide patient education as appropriate  Outcome: Progressing     Problem: PAIN - ADULT  Goal: Verbalizes/displays adequate comfort level or baseline comfort level  Description  Interventions:  - Encourage patient to monitor pain and request assistance  - Assess pain using appropriate pain scale  - Administer analgesics based on type and severity of pain and evaluate response  - Implement non-pharmacological measures as appropriate and evaluate response  - Consider cultural and social influences on pain and pain management  - Notify physician/advanced practitioner if interventions unsuccessful or patient reports new pain  Outcome: Progressing     Problem: INFECTION - ADULT  Goal: Absence or prevention of progression during hospitalization  Description  INTERVENTIONS:  - Assess and monitor for signs and symptoms of infection  - Monitor lab/diagnostic results  - Monitor all insertion sites, i e  indwelling lines, tubes, and drains  - Monitor endotracheal (as able) and nasal secretions for changes in amount and color  - [place] appropriate cooling/warming therapies per order  - Administer medications as ordered  - Instruct and encourage patient and family to use good hand hygiene technique  - Identify and instruct in appropriate isolation precautions for identified infection/condition  Outcome: Progressing     Problem: SAFETY ADULT  Goal: Patient will remain free of falls  Description  INTERVENTIONS:  - Assess patient frequently for physical needs  -  Identify cognitive and physical deficits and behaviors that affect risk of falls    -  [place] fall precautions as indicated by assessment   - Educate patient/family on patient safety including physical limitations  - Instruct patient to call for assistance with activity based on assessment  - Modify environment to reduce risk of injury  - Consider OT/PT consult to assist with strengthening/mobility  Outcome: Progressing     Problem: DISCHARGE PLANNING  Goal: Discharge to home or other facility with appropriate resources  Description  INTERVENTIONS:  - Identify barriers to discharge w/patient and caregiver  - Arrange for needed discharge resources and transportation as appropriate  - Identify discharge learning needs (meds, wound care, etc )  - Arrange for interpretive services to assist at discharge as needed  - Refer to Case Management Department for coordinating discharge planning if the patient needs post-hospital services based on physician/advanced practitioner order or complex needs related to functional status, cognitive ability, or social support system  Outcome: Progressing     Problem: Knowledge Deficit  Goal: Patient/family/caregiver demonstrates understanding of disease process, treatment plan, medications, and discharge instructions  Description  Complete learning assessment and assess knowledge base  Interventions:  - Provide teaching at level of understanding  - Provide teaching via preferred learning methods  Outcome: Progressing     Problem: Nutrition/Hydration-ADULT  Goal: Nutrient/Hydration intake appropriate for improving, restoring or maintaining nutritional needs  Description  Monitor and assess patient's nutrition/hydration status for malnutrition (ex- brittle hair, bruises, dry skin, pale skin and conjunctiva, muscle wasting, smooth red tongue, and disorientation)  Collaborate with interdisciplinary team and initiate plan and interventions as ordered  Monitor patient's weight and dietary intake as ordered or per policy  Utilize nutrition screening tool and intervene per policy  Determine patient's food preferences and provide high-protein, high-caloric foods as appropriate       INTERVENTIONS:  - Monitor oral intake, urinary output, labs, and treatment plans  - Assess nutrition and hydration status and recommend course of action  - Evaluate amount of meals eaten  - Assist patient with eating if necessary   - Allow adequate time for meals  - Recommend/ encourage appropriate diets, oral nutritional supplements, and vitamin/mineral supplements  - Order, calculate, and assess calorie counts as needed  - Recommend, monitor, and adjust tube feedings and TPN/PPN based on assessed needs  - Assess need for intravenous fluids  - Provide specific nutrition/hydration education as appropriate  - Include patient/family/caregiver in decisions related to nutrition  Outcome: Progressing

## 2019-07-30 NOTE — PROGRESS NOTES
Progress Note - General Surgery   Hanna Current 79 y o  male MRN: [id number]  Unit/Bed#: [address] [place] [address] Encounter: [id number]    Assessment/Plan:  Nonhealing diabetic wound of left foot with surrounding cellulitis and Charcot deformity  -complicated by history of uncontrolled diabetes, diabetic neuropathy, morbid obesity, and limited mobility  -MRI reviewed by podiatry  No presence of osteomyelitis but foot has been deemed unsalvageable   -patient will require leg amputation  Unable to perform BKA due to significant skin thickening and edema of the patient's lower leg secondary to venous stasis disease and lymphedema   -will plan for AKA tomorrow  Discussed procedure in detail with the patient  He understands that he is at high risk for wound healing issues due to his current medical problems and uncontrolled diabetes  He has given his consent written form    -NPO after midnight  -type and screen for a m   -continue IV antibiotics per ID  -continue [address] [place] as ordered     Chronic lower leg lymphedema with stasis dermatitis of both lower extremities  -patient will require AKA secondary to lymphedema of left lower extremity   -continue, monitoring, LWC     Diabetes mellitus, type 2- uncontrolled  -last A1C 9 4  -encourage tight glucose control for optimal wound healing and control of infection  -continue medical management     Chronic afib on Coumadin anticoagulation  -continue hold on coumadin in anticipation for surgery  -patient currently on heparin drip, will hold to hours prior to surgical intervention  -continue medical management  -monitor PTT     Morbid Obesity  -encourage weight loss program  -discussed obesity contributing to medical comorbidities     CHF, COPD, HTN, CKD  -creatinine elevated today, continue to monitor, avoid nephrotoxic agents  -continue current medications  -continue medical management     Subjective/Objective   Chief Complaint: frustrated    Subjective:  Patient is frustrated that left foot has been deemed unsalvageable  He understands the need for lower extremity amputation  He denies any fevers or chills  Denies pain currently in left foot  Tolerating diet without issues  Objective:     Blood pressure 131/76, pulse 75, temperature 97 9 °F (36 6 °C), temperature source Oral, resp  rate 18, height 6' 3" (1 905 m), weight (!) 166 kg (365 lb 4 8 oz), SpO2 92 %  ,Body mass index is 45 66 kg/m²  Intake/Output Summary (Last 24 hours) at 7/30/2019 1419  Last data filed at 7/30/2019 1000  Gross per 24 hour   Intake 1027 09 ml   Output 850 ml   Net 177 09 ml       Invasive Devices     Peripherally Inserted Central Catheter Line            PICC Line 23/89/12 Right Basilic 1 day          Peripheral Intravenous Line            Peripheral IV 07/27/19 Left;Ventral (anterior) Forearm 2 days    Peripheral IV 07/29/19 Right;Ventral (anterior) Forearm 1 day                Physical Exam:   General appearance: Morbidly obese, alert and oriented, in no acute distress  Head: Normocephalic, without obvious abnormality, atraumatic, sclerae anicteric, mucous membranes moist  Neck: no JVD and supple, symmetrical, trachea midline  Lungs: clear to auscultation, no wheezes or rales  Heart:  Irregularly irregular rhythm, rate controlled  Abdomen: Morbidly obese, soft, nontender, active bowel sounds  Extremities:   Chronic bilateral stasis changes below the knee with thickening of the skin  Lymphedema left lower leg  Bilateral pitting edema with left foot more edematous than the right  Left foot dressing in place  Feet are warm to touch bilaterally  Nonpalpable distal pulses bilaterally  Skin: Warm, dry  Nursing notes and vital signs reviewed      Lab, Imaging and other studies:  I have personally reviewed pertinent lab results    , CBC:   Lab Results   Component Value Date    WBC 7 57 07/30/2019    HGB 9 7 (L) 07/30/2019    HCT 32 8 (L) 07/30/2019    MCV 92 07/30/2019     07/30/2019    MCH 27 1 07/30/2019    MCHC 29 6 (L) 07/30/2019    RDW 15 9 (H) 07/30/2019    MPV 10 0 07/30/2019    NRBC 0 07/30/2019   , CMP:   Lab Results   Component Value Date    SODIUM 139 07/30/2019    K 4 4 07/30/2019     07/30/2019    CO2 27 07/30/2019    BUN 66 (H) 07/30/2019    CREATININE 1 85 (H) 07/30/2019    CALCIUM 8 3 07/30/2019    EGFR 37 07/30/2019   , Coagulation: No results found for: PT, INR, APTT  VTE Pharmacologic Prophylaxis: Heparin  VTE Mechanical Prophylaxis:  Contraindicated, PAD    Irving Sánchez PA-C

## 2019-07-30 NOTE — PROGRESS NOTES
Vancomycin IV Pharmacy-to-Dose Consultation    Walt Singh is a 79 y o  male who is currently receiving Vancomycin IV with management by the Pharmacy Consult service  Assessment/Plan:  The patient was reviewed  Renal function is stable and no signs or symptoms of nephrotoxicity and/or infusion reactions were documented in the chart  Based on todays assessment, continue current vancomycin (day # 6) dosing of 2000 mg every 12 hours with a plan for trough to be drawn at 12:30 on 7/31/19  We will continue to follow the patients culture results and clinical progress daily      Amanda Scruggs, Pharmacist

## 2019-07-31 ENCOUNTER — ANESTHESIA EVENT (INPATIENT)
Dept: PERIOP | Facility: HOSPITAL | Age: 69
DRG: 616 | End: 2019-07-31
Payer: COMMERCIAL

## 2019-07-31 ENCOUNTER — ANESTHESIA (INPATIENT)
Dept: PERIOP | Facility: HOSPITAL | Age: 69
DRG: 616 | End: 2019-07-31
Payer: COMMERCIAL

## 2019-07-31 DIAGNOSIS — I10 ESSENTIAL HYPERTENSION: ICD-10-CM

## 2019-07-31 LAB
ABO GROUP BLD: NORMAL
ANION GAP SERPL CALCULATED.3IONS-SCNC: 5 MMOL/L (ref 4–13)
ANISOCYTOSIS BLD QL SMEAR: PRESENT
APTT PPP: 32 SECONDS (ref 23–37)
BASOPHILS # BLD MANUAL: 0.21 THOUSAND/UL (ref 0–0.1)
BASOPHILS NFR MAR MANUAL: 3 % (ref 0–1)
BLD GP AB SCN SERPL QL: NEGATIVE
BUN SERPL-MCNC: 54 MG/DL (ref 5–25)
CALCIUM SERPL-MCNC: 8.5 MG/DL (ref 8.3–10.1)
CHLORIDE SERPL-SCNC: 105 MMOL/L (ref 100–108)
CO2 SERPL-SCNC: 30 MMOL/L (ref 21–32)
CREAT SERPL-MCNC: 1.45 MG/DL (ref 0.6–1.3)
EOSINOPHIL # BLD MANUAL: 0.14 THOUSAND/UL (ref 0–0.4)
EOSINOPHIL NFR BLD MANUAL: 2 % (ref 0–6)
ERYTHROCYTE [DISTWIDTH] IN BLOOD BY AUTOMATED COUNT: 16.2 % (ref 11.6–15.1)
GFR SERPL CREATININE-BSD FRML MDRD: 50 ML/MIN/1.73SQ M
GLUCOSE SERPL-MCNC: 139 MG/DL (ref 65–140)
GLUCOSE SERPL-MCNC: 169 MG/DL (ref 65–140)
GLUCOSE SERPL-MCNC: 176 MG/DL (ref 65–140)
GLUCOSE SERPL-MCNC: 214 MG/DL (ref 65–140)
GLUCOSE SERPL-MCNC: 271 MG/DL (ref 65–140)
HCT VFR BLD AUTO: 32.2 % (ref 36.5–49.3)
HGB BLD-MCNC: 9.5 G/DL (ref 12–17)
HYPERCHROMIA BLD QL SMEAR: PRESENT
INR PPP: 1.28 (ref 0.84–1.19)
LYMPHOCYTES # BLD AUTO: 1.61 THOUSAND/UL (ref 0.6–4.47)
LYMPHOCYTES # BLD AUTO: 23 % (ref 14–44)
MCH RBC QN AUTO: 27.3 PG (ref 26.8–34.3)
MCHC RBC AUTO-ENTMCNC: 29.5 G/DL (ref 31.4–37.4)
MCV RBC AUTO: 93 FL (ref 82–98)
MONOCYTES # BLD AUTO: 0.07 THOUSAND/UL (ref 0–1.22)
MONOCYTES NFR BLD: 1 % (ref 4–12)
NEUTROPHILS # BLD MANUAL: 4.96 THOUSAND/UL (ref 1.85–7.62)
NEUTS BAND NFR BLD MANUAL: 3 % (ref 0–8)
NEUTS SEG NFR BLD AUTO: 68 % (ref 43–75)
NRBC BLD AUTO-RTO: 0 /100 WBCS
NRBC BLD AUTO-RTO: 4 /100 WBC (ref 0–2)
PLATELET # BLD AUTO: 246 THOUSANDS/UL (ref 149–390)
PLATELET BLD QL SMEAR: ADEQUATE
PMV BLD AUTO: 10.4 FL (ref 8.9–12.7)
POLYCHROMASIA BLD QL SMEAR: PRESENT
POTASSIUM SERPL-SCNC: 4.5 MMOL/L (ref 3.5–5.3)
PROTHROMBIN TIME: 15.7 SECONDS (ref 11.6–14.5)
RBC # BLD AUTO: 3.48 MILLION/UL (ref 3.88–5.62)
RBC MORPH BLD: PRESENT
RH BLD: POSITIVE
SODIUM SERPL-SCNC: 140 MMOL/L (ref 136–145)
SPECIMEN EXPIRATION DATE: NORMAL
TOTAL CELLS COUNTED SPEC: 100
WBC # BLD AUTO: 6.98 THOUSAND/UL (ref 4.31–10.16)

## 2019-07-31 PROCEDURE — 85610 PROTHROMBIN TIME: CPT | Performed by: PHYSICIAN ASSISTANT

## 2019-07-31 PROCEDURE — 80048 BASIC METABOLIC PNL TOTAL CA: CPT | Performed by: HOSPITALIST

## 2019-07-31 PROCEDURE — 88311 DECALCIFY TISSUE: CPT | Performed by: PATHOLOGY

## 2019-07-31 PROCEDURE — 82948 REAGENT STRIP/BLOOD GLUCOSE: CPT

## 2019-07-31 PROCEDURE — 27590 AMPUTATE LEG AT THIGH: CPT | Performed by: SURGERY

## 2019-07-31 PROCEDURE — 86850 RBC ANTIBODY SCREEN: CPT | Performed by: PHYSICIAN ASSISTANT

## 2019-07-31 PROCEDURE — 94760 N-INVAS EAR/PLS OXIMETRY 1: CPT

## 2019-07-31 PROCEDURE — 85730 THROMBOPLASTIN TIME PARTIAL: CPT | Performed by: HOSPITALIST

## 2019-07-31 PROCEDURE — NC001 PR NO CHARGE: Performed by: SURGERY

## 2019-07-31 PROCEDURE — 88307 TISSUE EXAM BY PATHOLOGIST: CPT | Performed by: PATHOLOGY

## 2019-07-31 PROCEDURE — 0Y6D0Z2 DETACHMENT AT LEFT UPPER LEG, MID, OPEN APPROACH: ICD-10-PCS | Performed by: SURGERY

## 2019-07-31 PROCEDURE — 94640 AIRWAY INHALATION TREATMENT: CPT

## 2019-07-31 PROCEDURE — 99232 SBSQ HOSP IP/OBS MODERATE 35: CPT | Performed by: INTERNAL MEDICINE

## 2019-07-31 PROCEDURE — 85007 BL SMEAR W/DIFF WBC COUNT: CPT | Performed by: HOSPITALIST

## 2019-07-31 PROCEDURE — 86901 BLOOD TYPING SEROLOGIC RH(D): CPT | Performed by: PHYSICIAN ASSISTANT

## 2019-07-31 PROCEDURE — 86900 BLOOD TYPING SEROLOGIC ABO: CPT | Performed by: PHYSICIAN ASSISTANT

## 2019-07-31 PROCEDURE — 27590 AMPUTATE LEG AT THIGH: CPT | Performed by: PHYSICIAN ASSISTANT

## 2019-07-31 PROCEDURE — 85027 COMPLETE CBC AUTOMATED: CPT | Performed by: HOSPITALIST

## 2019-07-31 RX ORDER — HYDROCODONE BITARTRATE AND ACETAMINOPHEN 5; 325 MG/1; MG/1
2 TABLET ORAL EVERY 4 HOURS PRN
Status: DISCONTINUED | OUTPATIENT
Start: 2019-07-31 | End: 2019-08-01

## 2019-07-31 RX ORDER — MIDAZOLAM HYDROCHLORIDE 1 MG/ML
INJECTION INTRAMUSCULAR; INTRAVENOUS AS NEEDED
Status: DISCONTINUED | OUTPATIENT
Start: 2019-07-31 | End: 2019-07-31 | Stop reason: SURG

## 2019-07-31 RX ORDER — BUPIVACAINE HYDROCHLORIDE 7.5 MG/ML
INJECTION, SOLUTION EPIDURAL; RETROBULBAR AS NEEDED
Status: DISCONTINUED | OUTPATIENT
Start: 2019-07-31 | End: 2019-07-31 | Stop reason: SURG

## 2019-07-31 RX ORDER — EPHEDRINE SULFATE 50 MG/ML
INJECTION INTRAVENOUS AS NEEDED
Status: DISCONTINUED | OUTPATIENT
Start: 2019-07-31 | End: 2019-07-31 | Stop reason: SURG

## 2019-07-31 RX ORDER — HYDROMORPHONE HCL/PF 1 MG/ML
1 SYRINGE (ML) INJECTION
Status: DISCONTINUED | OUTPATIENT
Start: 2019-07-31 | End: 2019-08-01

## 2019-07-31 RX ORDER — PROPOFOL 10 MG/ML
INJECTION, EMULSION INTRAVENOUS AS NEEDED
Status: DISCONTINUED | OUTPATIENT
Start: 2019-07-31 | End: 2019-07-31 | Stop reason: SURG

## 2019-07-31 RX ORDER — HEPARIN SODIUM 5000 [USP'U]/ML
5000 INJECTION, SOLUTION INTRAVENOUS; SUBCUTANEOUS EVERY 8 HOURS SCHEDULED
Status: DISCONTINUED | OUTPATIENT
Start: 2019-07-31 | End: 2019-08-01

## 2019-07-31 RX ORDER — ONDANSETRON 2 MG/ML
4 INJECTION INTRAMUSCULAR; INTRAVENOUS ONCE AS NEEDED
Status: DISCONTINUED | OUTPATIENT
Start: 2019-07-31 | End: 2019-07-31 | Stop reason: HOSPADM

## 2019-07-31 RX ORDER — HYDROMORPHONE HCL/PF 1 MG/ML
0.5 SYRINGE (ML) INJECTION EVERY 2 HOUR PRN
Status: DISCONTINUED | OUTPATIENT
Start: 2019-07-31 | End: 2019-08-12 | Stop reason: HOSPADM

## 2019-07-31 RX ORDER — SODIUM CHLORIDE, SODIUM LACTATE, POTASSIUM CHLORIDE, CALCIUM CHLORIDE 600; 310; 30; 20 MG/100ML; MG/100ML; MG/100ML; MG/100ML
INJECTION, SOLUTION INTRAVENOUS CONTINUOUS PRN
Status: DISCONTINUED | OUTPATIENT
Start: 2019-07-31 | End: 2019-07-31 | Stop reason: SURG

## 2019-07-31 RX ORDER — FENTANYL CITRATE/PF 50 MCG/ML
25 SYRINGE (ML) INJECTION
Status: DISCONTINUED | OUTPATIENT
Start: 2019-07-31 | End: 2019-07-31 | Stop reason: HOSPADM

## 2019-07-31 RX ORDER — MAGNESIUM HYDROXIDE 1200 MG/15ML
LIQUID ORAL AS NEEDED
Status: DISCONTINUED | OUTPATIENT
Start: 2019-07-31 | End: 2019-07-31 | Stop reason: HOSPADM

## 2019-07-31 RX ORDER — SPIRONOLACTONE 25 MG/1
TABLET ORAL
Qty: 30 TABLET | Refills: 2 | OUTPATIENT
Start: 2019-07-31 | End: 2019-08-12 | Stop reason: HOSPADM

## 2019-07-31 RX ADMIN — HYDROMORPHONE HYDROCHLORIDE 1 MG: 1 INJECTION, SOLUTION INTRAMUSCULAR; INTRAVENOUS; SUBCUTANEOUS at 16:00

## 2019-07-31 RX ADMIN — INSULIN GLARGINE 14 UNITS: 100 INJECTION, SOLUTION SUBCUTANEOUS at 21:50

## 2019-07-31 RX ADMIN — FLUTICASONE FUROATE AND VILANTEROL TRIFENATATE 1 PUFF: 100; 25 POWDER RESPIRATORY (INHALATION) at 07:52

## 2019-07-31 RX ADMIN — INSULIN LISPRO 6 UNITS: 100 INJECTION, SOLUTION INTRAVENOUS; SUBCUTANEOUS at 17:02

## 2019-07-31 RX ADMIN — HYDROCODONE BITARTRATE AND ACETAMINOPHEN 2 TABLET: 5; 325 TABLET ORAL at 17:14

## 2019-07-31 RX ADMIN — METOPROLOL TARTRATE 25 MG: 25 TABLET, FILM COATED ORAL at 21:49

## 2019-07-31 RX ADMIN — INSULIN LISPRO 2 UNITS: 100 INJECTION, SOLUTION INTRAVENOUS; SUBCUTANEOUS at 08:30

## 2019-07-31 RX ADMIN — HYDROMORPHONE HYDROCHLORIDE 1 MG: 1 INJECTION, SOLUTION INTRAMUSCULAR; INTRAVENOUS; SUBCUTANEOUS at 13:09

## 2019-07-31 RX ADMIN — INSULIN LISPRO 30 UNITS: 100 INJECTION, SOLUTION INTRAVENOUS; SUBCUTANEOUS at 17:03

## 2019-07-31 RX ADMIN — ALLOPURINOL 300 MG: 300 TABLET ORAL at 13:08

## 2019-07-31 RX ADMIN — HYDROMORPHONE HYDROCHLORIDE 0.5 MG: 1 INJECTION, SOLUTION INTRAMUSCULAR; INTRAVENOUS; SUBCUTANEOUS at 14:13

## 2019-07-31 RX ADMIN — HYDROCODONE BITARTRATE AND ACETAMINOPHEN 2 TABLET: 5; 325 TABLET ORAL at 13:09

## 2019-07-31 RX ADMIN — METOPROLOL TARTRATE 25 MG: 25 TABLET, FILM COATED ORAL at 08:25

## 2019-07-31 RX ADMIN — VANCOMYCIN HYDROCHLORIDE 2000 MG: 1 INJECTION, POWDER, LYOPHILIZED, FOR SOLUTION INTRAVENOUS at 13:08

## 2019-07-31 RX ADMIN — BUPIVACAINE HYDROCHLORIDE 1.5 ML: 7.5 INJECTION, SOLUTION EPIDURAL; RETROBULBAR at 09:25

## 2019-07-31 RX ADMIN — HEPARIN SODIUM 5000 UNITS: 5000 INJECTION, SOLUTION INTRAVENOUS; SUBCUTANEOUS at 21:49

## 2019-07-31 RX ADMIN — FENTANYL CITRATE 25 MCG: 50 INJECTION, SOLUTION INTRAMUSCULAR; INTRAVENOUS at 11:56

## 2019-07-31 RX ADMIN — MIDAZOLAM 2 MG: 1 INJECTION INTRAMUSCULAR; INTRAVENOUS at 09:16

## 2019-07-31 RX ADMIN — EPHEDRINE SULFATE 10 MG: 50 INJECTION, SOLUTION INTRAVENOUS at 09:32

## 2019-07-31 RX ADMIN — HYDROMORPHONE HYDROCHLORIDE 0.5 MG: 1 INJECTION, SOLUTION INTRAMUSCULAR; INTRAVENOUS; SUBCUTANEOUS at 17:14

## 2019-07-31 RX ADMIN — FENTANYL CITRATE 25 MCG: 50 INJECTION, SOLUTION INTRAMUSCULAR; INTRAVENOUS at 12:02

## 2019-07-31 RX ADMIN — ALBUTEROL SULFATE 2 PUFF: 90 AEROSOL, METERED RESPIRATORY (INHALATION) at 07:49

## 2019-07-31 RX ADMIN — PROPOFOL 20 MG: 10 INJECTION, EMULSION INTRAVENOUS at 09:36

## 2019-07-31 RX ADMIN — CEFEPIME HYDROCHLORIDE 2000 MG: 2 INJECTION, SOLUTION INTRAVENOUS at 15:57

## 2019-07-31 RX ADMIN — VANCOMYCIN HYDROCHLORIDE 2000 MG: 1 INJECTION, POWDER, LYOPHILIZED, FOR SOLUTION INTRAVENOUS at 00:52

## 2019-07-31 RX ADMIN — SODIUM CHLORIDE, SODIUM LACTATE, POTASSIUM CHLORIDE, AND CALCIUM CHLORIDE: .6; .31; .03; .02 INJECTION, SOLUTION INTRAVENOUS at 09:16

## 2019-07-31 RX ADMIN — CEFEPIME HYDROCHLORIDE 2000 MG: 2 INJECTION, SOLUTION INTRAVENOUS at 03:30

## 2019-07-31 RX ADMIN — ATORVASTATIN CALCIUM 20 MG: 20 TABLET, FILM COATED ORAL at 13:08

## 2019-07-31 NOTE — ANESTHESIA PREPROCEDURE EVALUATION
Review of Systems/Medical History          Cardiovascular  Hyperlipidemia, Hypertension , CHF , PVD,    Pulmonary  COPD , Asthma , Sleep apnea ,        GI/Hepatic    GERD well controlled,             Endo/Other  Diabetes Insulin,      GYN       Hematology   Musculoskeletal    Comment: BMI 46      Neurology   Psychology           Physical Exam    Airway    Mallampati score: II  TM Distance: >3 FB  Neck ROM: full     Dental   Comment: Some chipped teeth,     Cardiovascular  Rhythm: irregular, Rate: normal,     Pulmonary  Decreased breath sounds, Wheezes, Rales,     Other Findings        Anesthesia Plan  ASA Score- 4     Anesthesia Type- spinal with ASA Monitors  Additional Monitors:   Airway Plan:         Plan Factors-    Induction- intravenous  Postoperative Plan- Plan for postoperative opioid use  Informed Consent- Anesthetic plan and risks discussed with patient  I personally reviewed this patient with the CRNA  Discussed and agreed on the Anesthesia Plan with the CRNA  Flower Tellez

## 2019-07-31 NOTE — ANESTHESIA PROCEDURE NOTES
Spinal Block    Patient location during procedure: OR  Start time: 7/31/2019 9:26 AM  Reason for block: procedure for pain  Staffing  Performed: anesthesiologist   Preanesthetic Checklist  Completed: patient identified, site marked, surgical consent, pre-op evaluation, timeout performed, IV checked, risks and benefits discussed and monitors and equipment checked  Spinal Block  Patient position: sitting  Prep: Betadine  Patient monitoring: cardiac monitor, continuous pulse ox and frequent blood pressure checks  Approach: midline  Location: L3-4  Injection technique: single-shot  Needle  Needle type: pencil-tip   Needle gauge: 25 G  Needle length: 5 cm  Assessment  Sensory level: T10  Injection Assessment:  no paresthesia on injection and positive aspiration for clear CSF    Post-procedure:  site cleaned

## 2019-07-31 NOTE — QUICK NOTE
I have Chart Check for the patient for today  The patient is stable from an Infectious Disease Standpoint   We will round on the patient again on: 8/1/2019 Continue with Antibiotics  Patient is POD 0 of left AKA      Thank you,    Dr Teddy Bonilla  [phone]

## 2019-07-31 NOTE — PLAN OF CARE
Problem: Potential for Falls  Goal: Patient will remain free of falls  Description  INTERVENTIONS:  - Assess patient frequently for physical needs  -  Identify cognitive and physical deficits and behaviors that affect risk of falls    -  [place] fall precautions as indicated by assessment   - Educate patient/family on patient safety including physical limitations  - Instruct patient to call for assistance with activity based on assessment  - Modify environment to reduce risk of injury  - Consider OT/PT consult to assist with strengthening/mobility  Outcome: Progressing     Problem: SKIN/TISSUE INTEGRITY - ADULT  Goal: Incision(s), wounds(s) or drain site(s) healing without S/S of infection  Description  INTERVENTIONS  - Assess and document risk factors for skin impairment   - Assess and document dressing, incision, wound bed, drain sites and surrounding tissue  - Initiate Nutrition services consult and/or wound management as needed  Outcome: Progressing     Problem: MUSCULOSKELETAL - ADULT  Goal: Maintain or return mobility to safest level of function  Description  INTERVENTIONS:  - Assess patient's ability to carry out ADLs; assess patient's baseline for ADL function and identify physical deficits which impact ability to perform ADLs (bathing, care of mouth/teeth, toileting, grooming, dressing, etc )  - Assess/evaluate cause of self-care deficits   - Assess range of motion  - Assess patient's mobility; develop plan if impaired  - Assess patient's need for assistive devices and provide as appropriate  - Encourage maximum independence but intervene and supervise when necessary  - Involve family in performance of ADLs  - Assess for home care needs following discharge   - Request OT consult to assist with ADL evaluation and planning for discharge  - Provide patient education as appropriate  Outcome: Progressing     Problem: PAIN - ADULT  Goal: Verbalizes/displays adequate comfort level or baseline comfort level  Description  Interventions:  - Encourage patient to monitor pain and request assistance  - Assess pain using appropriate pain scale  - Administer analgesics based on type and severity of pain and evaluate response  - Implement non-pharmacological measures as appropriate and evaluate response  - Consider cultural and social influences on pain and pain management  - Notify physician/advanced practitioner if interventions unsuccessful or patient reports new pain  Outcome: Progressing     Problem: INFECTION - ADULT  Goal: Absence or prevention of progression during hospitalization  Description  INTERVENTIONS:  - Assess and monitor for signs and symptoms of infection  - Monitor lab/diagnostic results  - Monitor all insertion sites, i e  indwelling lines, tubes, and drains  - Monitor endotracheal (as able) and nasal secretions for changes in amount and color  - [place] appropriate cooling/warming therapies per order  - Administer medications as ordered  - Instruct and encourage patient and family to use good hand hygiene technique  - Identify and instruct in appropriate isolation precautions for identified infection/condition  Outcome: Progressing     Problem: SAFETY ADULT  Goal: Patient will remain free of falls  Description  INTERVENTIONS:  - Assess patient frequently for physical needs  -  Identify cognitive and physical deficits and behaviors that affect risk of falls    -  [place] fall precautions as indicated by assessment   - Educate patient/family on patient safety including physical limitations  - Instruct patient to call for assistance with activity based on assessment  - Modify environment to reduce risk of injury  - Consider OT/PT consult to assist with strengthening/mobility  Outcome: Progressing     Problem: DISCHARGE PLANNING  Goal: Discharge to home or other facility with appropriate resources  Description  INTERVENTIONS:  - Identify barriers to discharge w/patient and caregiver  - Arrange for needed discharge resources and transportation as appropriate  - Identify discharge learning needs (meds, wound care, etc )  - Arrange for interpretive services to assist at discharge as needed  - Refer to Case Management Department for coordinating discharge planning if the patient needs post-hospital services based on physician/advanced practitioner order or complex needs related to functional status, cognitive ability, or social support system  Outcome: Progressing     Problem: Knowledge Deficit  Goal: Patient/family/caregiver demonstrates understanding of disease process, treatment plan, medications, and discharge instructions  Description  Complete learning assessment and assess knowledge base  Interventions:  - Provide teaching at level of understanding  - Provide teaching via preferred learning methods  Outcome: Progressing     Problem: Nutrition/Hydration-ADULT  Goal: Nutrient/Hydration intake appropriate for improving, restoring or maintaining nutritional needs  Description  Monitor and assess patient's nutrition/hydration status for malnutrition (ex- brittle hair, bruises, dry skin, pale skin and conjunctiva, muscle wasting, smooth red tongue, and disorientation)  Collaborate with interdisciplinary team and initiate plan and interventions as ordered  Monitor patient's weight and dietary intake as ordered or per policy  Utilize nutrition screening tool and intervene per policy  Determine patient's food preferences and provide high-protein, high-caloric foods as appropriate       INTERVENTIONS:  - Monitor oral intake, urinary output, labs, and treatment plans  - Assess nutrition and hydration status and recommend course of action  - Evaluate amount of meals eaten  - Assist patient with eating if necessary   - Allow adequate time for meals  - Recommend/ encourage appropriate diets, oral nutritional supplements, and vitamin/mineral supplements  - Order, calculate, and assess calorie counts as needed  - Recommend, monitor, and adjust tube feedings and TPN/PPN based on assessed needs  - Assess need for intravenous fluids  - Provide specific nutrition/hydration education as appropriate  - Include patient/family/caregiver in decisions related to nutrition  Outcome: Progressing

## 2019-07-31 NOTE — PROGRESS NOTES
Vancomycin IV Pharmacy-to-Dose Consultation    Hang Tam is a 79 y o  obese male who is currently receiving Vancomycin 2000mg IV Q12H via management by the Pharmacy Consult service  Indication is skin/soft tissue infection with a goal Vanco trough of 15-20  Assessment/Plan:  The patient was to have a Vancomycin trough drawn today (7/31/19) @1230, prior to the 1300 dose  However, this was not completed and the next dose of 2000mg was administered as scheduled  The patient did have a left AKA today and the MAR was placed on hold, including Vancomycin  At this time, a new order for Vanco trough will be entered for overnight (8/1/19 @0030)  Additionally, Pharmacy will follow up with Nursing regarding the missed level and to ensure new level is drawn in a timely and accurate manner  Pharmacy will continue to follow the patients culture results and clinical progress daily      Yousif Johnston, Pharmacist

## 2019-07-31 NOTE — OP NOTE
AKA Procedure Note    Name: Bibi Beaulieu   : 1950  MRN: [id number]  Date: 2019  Indications: The patient presented with a history and symptoms of Non-healing wound of left leg/Osteomyeletis  Pre-operative Diagnosis: Non-healing wound of left leg/Osteomyeletis    Post-operative Diagnosis: Non-healing wound of left leg/Osteomyeletis    Procedure: Left above the knee amputation    Surgeon: Tiffanie Velasquez MD    Assistants: Yousif Sánchez/Anabelle Jung PA-C    Anesthesia: General endotracheal anesthesia     ASA Class: 4    Note: No qualified resident available  Physician Assistant medically necessary for surgical safety of case and for suturing, retraction, and hemostasis  Procedure Details: The patient was seen again in the Holding Room  The risks, benefits, complications, treatment options, and expected outcomes were discussed with the patient and/or family  The possibilities of reaction to medication, pulmonary aspiration, perforation of viscus, bleeding, recurrent infection, finding a normal appendix, the need for additional procedures, failure to diagnose a condition, and creating a complication requiring transfusion or operation were discussed  There was concurrence with the proposed plan and informed consent was obtained  The site of surgery was properly noted/marked  The patient was taken to Operating Room, identified as Bibi Beaulieu and the procedure verified  A Time Out was held after the prep and draping,  and the above information confirmed  The patient was placed in the supine position  The appropriate lower extremity  was prepped and draped in a sterile fashion  Anterior and posterior skin incision were outlined with a marking pen  The anterior and posterior skin incision was made with a scalpel 5-10 cm proximal to the knee joint in a fish mouth fashion  The skin and subcutaneous tissues were incised down to the fascia   The greater saphenous veins were ligated and divided  The fascia and muscles were divided with electrocautery at the same level of the skin incision  The neurovascular bundle was identified on the medial aspect of the thigh  The popliteal artery and veins were isolated and ligated  The sciatic nerve was pulled and ligated   The posterior thigh muscles were then divided with  cautery  Once the muscle groups were circumferentially divided, the periosteum was incised and elevated 5cm proximally off the femur  The femur was transected with a bone saw and then filed  The amputation was completed with knife  Bleeding vessels were ligated with vicryl sutures and cautery  Sharp bony edges were filed  The wound was irrigated  The fascia as approxiamated using 0-Vicryl sutures in and interrupted fashion  Next the subcutaneous tissues were closed with interrupted 3-0 vicryl  Skin was closed with staples  The wound was dressed and first cast applied  Patient tolerated the procedure well and was taken to PACU in stable condition  Findings:  Nonhealing wound and osteomyelitis     Estimated Blood Loss:  250ml           Drains: No               Specimens: left lower extremity   Order Name Source Comment Collection Info Order Time   TISSUE EXAM Leg, Left  Collected By: Neo Mei MD 7/31/2019 10:27 AM              Complications:  None; patient tolerated the procedure well  Disposition: PACU            Condition: Stable    Attending Attestation: I was present for the entire procedure and qualified resident physician was not available      Signature:   Neo Mei MD  Date: 7/31/2019 Time: 11:03 AM

## 2019-07-31 NOTE — PROGRESS NOTES
Progress Note - General Surgery   Fernanda Rahman 79 y o  male MRN: [id number]  Unit/Bed#: OR POOL Encounter: [id number]    Assessment/Plan:  Nonhealing diabetic wound of left foot with surrounding cellulitis and Charcot deformity  -complicated by history of uncontrolled diabetes, diabetic neuropathy, morbid obesity, and limited mobility  -MRI reviewed by podiatry  No presence of osteomyelitis but foot has been deemed unsalvageable   -patient for left AKA today  Consent obtained  -continue NPO  -continue IV antibiotics per ID     Chronic lower leg lymphedema with stasis dermatitis of both lower extremities  -patient will require AKA secondary to lymphedema of left lower extremity   -continue, monitoring, diuretics     Diabetes mellitus, type 2- uncontrolled  -last A1C 9 4  -encourage tight glucose control for optimal wound healing and control of infection  -continue medical management     Chronic afib on Coumadin anticoagulation  -continue hold on coumadin in anticipation for surgery  -will restart heparin postoperatively  -continue medical management  -monitor PTT     Morbid Obesity  -encourage weight loss program  -discussed that obesity contributing to medical comorbidities     CHF, COPD, HTN, CKD  -creatinine elevated at 1 45, continue to monitor, avoid nephrotoxic agents  -continue current medications  -continue medical management     Subjective/Objective   Chief Complaint: frustrated    Subjective:  Patient frustrated that he requires a leg amputation  Otherwise he denies pain in left leg  No fevers or chills  Tolerated full diet yesterday  Objective:     Blood pressure 141/64, pulse (P) 68, temperature (P) 98 °F (36 7 °C), resp  rate (P) 18, height 6' 3" (1 905 m), weight (!) 167 kg (368 lb 14 4 oz), SpO2 (P) 94 %  ,Body mass index is 46 11 kg/m²        Intake/Output Summary (Last 24 hours) at 7/31/2019 1128  Last data filed at 7/31/2019 1053  Gross per 24 hour   Intake 600 ml   Output 901 ml   Net -301 ml       Invasive Devices     Peripherally Inserted Central Catheter Line            PICC Line 58/76/63 Right Basilic 1 day          Peripheral Intravenous Line            Peripheral IV 07/27/19 Left;Ventral (anterior) Forearm 3 days    Peripheral IV 07/29/19 Right;Ventral (anterior) Forearm 2 days                Physical Exam:   General appearance: morbidly obese, alert and oriented, in no acute distress  Head: Normocephalic, without obvious abnormality, atraumatic, sclerae anicteric, mucous membranes moist  Neck: no JVD and supple, symmetrical, trachea midline  Lungs: clear to auscultation, no wheezes or rales  Heart:  Irregularly irregular rhythm, rate controlled  Abdomen: Morbidly obese, soft, nontender, active bowel sounds  Extremities:   Left foot dressing in place with surrounding lymphedema, chronic venous stasis changes, mild cellulitis, significant edema  Skin: Warm, dry  Nursing notes and vital signs reviewed    Lab, Imaging and other studies:  I have personally reviewed pertinent lab results    , CBC:   Lab Results   Component Value Date    WBC 6 98 07/31/2019    HGB 9 5 (L) 07/31/2019    HCT 32 2 (L) 07/31/2019    MCV 93 07/31/2019     07/31/2019    MCH 27 3 07/31/2019    MCHC 29 5 (L) 07/31/2019    RDW 16 2 (H) 07/31/2019    MPV 10 4 07/31/2019    NRBC 0 07/31/2019    NRBC 4 (H) 07/31/2019   , CMP:   Lab Results   Component Value Date    SODIUM 140 07/31/2019    K 4 5 07/31/2019     07/31/2019    CO2 30 07/31/2019    BUN 54 (H) 07/31/2019    CREATININE 1 45 (H) 07/31/2019    CALCIUM 8 5 07/31/2019    EGFR 50 07/31/2019     VTE Pharmacologic Prophylaxis: Heparin  VTE Mechanical Prophylaxis: sequential compression device     Gayla Sánchez PA-C

## 2019-07-31 NOTE — INTERIM OP NOTE
AMPUTATION ABOVE KNEE (AKA)  Postoperative Note  PATIENT NAME: Shayy Marin  : 1950  MRN: [id number]  [facility] OR ROOM 03    Surgery Date: 2019    Preop Diagnosis:  Skin ulcer of left foot with necrosis of muscle ([facility] [address] ) [L97 523]    Post-Op Diagnosis Codes:     * Skin ulcer of left foot with necrosis of muscle ([facility] [address] ) [L97 523]  Lymphadema LLE, Chronic venous stasis disease LLE  Procedure(s) (LRB):  AMPUTATION ABOVE KNEE (AKA) (Left)    Surgeon(s) and Role:     * Tamiko Chapman MD - Primary     [facility] JEOY Sánchez - Assisting    Specimens:  ID Type Source Tests Collected by Time Destination   1 : Left Above Knee Amputation Tissue Leg, Left TISSUE EXAM Tamiko Chapman MD 2019 1009        Estimated Blood Loss: 200 ml    UO: not monitored    Drains: none    Anesthesia Type:   Spinal, IV sedation    Findings:    good blood supply, muscle and tissue viable  Complications:   None    SIGNATURE: Irving Sánchez PA-C   DATE: 2019   TIME: 11:17 AM

## 2019-07-31 NOTE — PROGRESS NOTES
Progress Note - Javi To 1950, 79 y o  male MRN: [id number]  Unit/Bed#: OR POOL Encounter: [id number]  Primary Care Provider: Jaclyn Newsome MD   Date and time admitted to hospital: 7/25/2019  1:04 PM        * Type 2 diabetes mellitus with left diabetic foot ulcer [facility])  Assessment & Plan  Lab Results   Component Value Date    HGBA1C 9 4 (H) 12/27/2018       Recent Labs     07/26/19  1106 07/26/19  1627 07/26/19  2138 07/27/19  0749   POCGLU 151* 105 64* 136     · Continue IV Vanco/Cefepime as per ID  · Continue Novolog/Lantus/sliding scale coverage  · Podiatry, ID general surgery on board  · The anterior ankle eschar is draining purulent material    · There is significant tissue loss/necrosis on the plantar mid-arch encompassing about 30% of the plantar surface  · The entire heel also demonstrates deep tissue injury  · The entire foot is warm  · The foot is collapsed into a rockerbottom Charcot defomity  · He is scheduled for  AKA today  Chronic venous stasis dermatitis of both lower extremities  Assessment & Plan  · Chronic, POA  · podiatry/wound care  Chronic diastolic congestive heart failure [facility])  Assessment & Plan  Wt Readings from Last 3 Encounters:   07/29/19 (!) 166 kg (366 lb 13 5 oz)   07/02/19 (!) 175 kg (386 lb)   06/10/19 (!) 171 kg (378 lb)     · Continue beta-blockers/Aldactone/Demadex        Chronic atrial fibrillation (HCC)  Assessment & Plan  · Patient will be continue on heparin drip  · Hold Coumadin for anticipated surgery  · Continue Lopressor, heparin drip  · Appreciate input from Cardiology      Essential hypertension  Assessment & Plan  · continue beta-blocker/Aldactone/Cozaar    Gout  Assessment & Plan  · continue allopurinol    Moderate persistent asthma without complication  Assessment & Plan  · cont albuterol/Advair    Morbid obesity ([address] )  Assessment & Plan  · Encourage weight loss    Acute renal failure superimposed on stage 3 chronic kidney disease ([facility] [address] )  Assessment & Plan  · Creatinine today at 1 45, improved compared to yesterday  Baseline creatinine 1 1-1 3  · This is most likely from aggressive IV diuresis, episodes of hypotension  · Will resume IV Lasix for surgery  · BMP tomorrow, creatinine continues to be elevated  · Ultrasound renals = No evidence of nephrolithiasis or hydronephrosis  Stable left renal cyst measuring 1 3 cm  Unremarkable bladder  VTE PROPHYLAXIS: Heparin drip    EDUCATION AND DISCUSSIONS WITH PATIENT/FAMILY: Patient updated  CURRENT LENGTH OF STAY: 6     CURRENT PATIENT STATUS: Inpatient     CERTIFICATION STATEMENT: The patient will continue to require additional inpatient hospital stay due to ongoing goals of care discussion as mentioned below  PATIENT CENTERED ROUNDS: I have performed bedside rounds with nursing staff today  ESTIMATED DISCHARGE DATE:  Patient is for AKA today  Anticipate hospitalization for next few days  No estimated discharge date as of yet  CODE STATUS: Level 1 - Full Code      ______________________________________________________________________    SUBJECTIVE:   Patient seen and examined  He states he feels well and anxious for surgery but prepared  Appears in good spirits  His significant other at bedside  No acute events overnight  Creatinine trending down  OBJECTIVE:     Vitals:   Temp:  [96 8 °F (36 °C)-98 5 °F (36 9 °C)] 96 8 °F (36 °C)  HR:  [49-79] 68  Resp:  [18] 18  BP: (141-156)/(64-67) 141/64  SpO2:  [90 %-93 %] 92 %  Temp (24hrs), Av 7 °F (36 5 °C), Min:96 8 °F (36 °C), Max:98 5 °F (36 9 °C)  Current: Temperature: (!) 96 8 °F (36 °C)    Intake/Output Summary (Last 24 hours) at 2019 0849  Last data filed at 2019 0330  Gross per 24 hour   Intake 72 ml   Output 901 ml   Net -829 ml       Physical Exam:   GENERAL: AAO x 3  Morbid obesity  HEENT: atraumatic, normocephalic  Oral mucosa moist, no icterus, pallor  PERRLA +   Neck supple, no JVD, no lymphadenopathy, no thryomegaly  CHEST: B/L breath sounds heard, occasional wheezing  CVS: S1, S2   ABDOMEN: Soft/obese/NT/BS heard  NEUROLOGICAL: CN II -XI grossly intact  No focal motor or sensory deficits  No signs of meningeal irritation or cerebellar dysfunction  EXTREMITIES:  Bilateral pitting pedal edema, grossly enlarged left lower extremity due to chronic lymphedema  There is an anterior ankle eschar draining purulent material  There is significant tissue loss / necrosis on the plantar mid-arch encompassing about 30% of the plantar surface  The entire heel also demonstrates deep tissue injury  The entire foot is warm, red and appears cellulitic  The foot is collapsed into a rockerbottom Charcot defomity      LABS:   7/31/2019 06:17 7/31/2019 06:21 7/31/2019 07:23   POC Glucose   176 (H)   Sodium 140     Chloride 105     CO2 30     Anion Gap 5     BUN 54 (H)     Creatinine 1 45 (H)     Glucose, Random 139     Calcium 8 5     eGFR 50     WBC 6 98     Red Blood Cell Count 3 48 (L)     Hemoglobin 9 5 (L)     HCT 32 2 (L)     MCV 93     MCH 27 3     MCHC 29 5 (L)     RDW 16 2 (H)     Platelet Count 076     MPV 10 4     Platelet Estimate Adequate     nRBC 0     Segs Relative 68     Abs Neutrophils 4 96     Bands Relative 3     Lymphocytes % 23     Monocytes 1 (L)     Eosinophils 2     Basophils Relative 3 (H)     Absolute Eosinophils 0 14     Basophils Absolute 0 21 (H)     Total Counted 100     NRBC's 4 (H)     RBC Morphology Present     Polychromasia Present     Anisocytosis Present     Hypochromia Present     Protime 15 7 (H)     INR 1 28 (H)     PTT  32    ABO Grouping O     Rh Factor Positive     Antibody Screen Negative     Specimen Expiration Date [id number]     LYMPHOCYTES ABSOLUTE 1 61     Monocytes Absolute 0 07         Scheduled Meds[de-identified]    Current Facility-Administered Medications:  [MAR Hold] acetaminophen 650 mg Oral Q6H PRN Abisai Merchant MD    [MAR Hold] albuterol 2 puff Inhalation Q6H PRN Jonathan Nunez Anne Marie Tavares MD    [facility] Hold] allopurinol 300 mg Oral Daily Garen Nyhan, MD    [facility] Hold] atorvastatin 20 mg Oral Daily Garen Nyhan, MD    [facility] Hold] cefepime 2,000 mg Intravenous Q12H Garen Nyhan, MD Last Rate: 2,000 mg (07/31/19 0330)   [MAR Hold] fluticasone-vilanterol 1 puff Inhalation Daily Garen Nyhan, MD    heparin (porcine) 3-20 Units/kg/hr (Order-Specific) Intravenous Titrated Jacy Salinas MD Last Rate: 20 Units/kg/hr (07/30/19 1802)   [MAR Hold] heparin (porcine) 2,000 Units Intravenous PRN Jacy Salinas MD    [MAR Hold] heparin (porcine) 4,000 Units Intravenous PRN Jacy Salinas MD    [MAR Hold] insulin glargine 14 Units Subcutaneous HS NICOLA Pfeiffer    [MAR Hold] insulin lispro 2-12 Units Subcutaneous TID AC Garen Nyhan, MD    [facility] Hold] insulin lispro 30 Units Subcutaneous TID With Meals Garen Nyhan, MD    [facility] Hold] metoprolol tartrate 25 mg Oral Q12H [address] Garen Nyhan, MD    [facility] Hold] vancomycin 2,000 mg Intravenous Q12H Garen Nyhan, MD Last Rate: 2,000 mg (07/31/19 0052)     Continuous Infusion: Heparin gtt    heparin (porcine) 3-20 Units/kg/hr (Order-Specific) Last Rate: 20 Units/kg/hr (07/30/19 1802)     PRN Meds    [MAR Hold] acetaminophen    [MAR Hold] albuterol    [MAR Hold] heparin (porcine)    [MAR Hold] heparin (porcine)    Time Spent for Care: 20 minutes  More than 50% of total time spent on counseling and coordination of care as described above  Todd Diaz MD  HOSPITALIST SERVICES  7/31/2019    PLEASE NOTE:  This encounter was completed utilizing the [employer] Direct Speech Voice Recognition Software  Grammatical errors, random word insertions, pronoun errors and incomplete sentences are occasional consequences of the system due to software limitations, ambient noise and hardware issues  These may be missed by proof reading prior to affixing electronic signature   Any questions or concerns about the content, text or information contained within the body of this dictation should be directly addressed to the physician for clarification  Please do not hesitate to call me directly if you have any any questions or concerns

## 2019-07-31 NOTE — ANESTHESIA POSTPROCEDURE EVALUATION
Post-Op Assessment Note    CV Status:  Stable  Pain Score: 0    Pain management: adequate     Mental Status:  Alert   Hydration Status:  Stable   PONV Controlled:  None   Airway Patency:  Patent   Post Op Vitals Reviewed: Yes      Staff: Anesthesiologist, with CRNAs           BP      Temp     Pulse     Resp      SpO2

## 2019-08-01 LAB
ANION GAP SERPL CALCULATED.3IONS-SCNC: 7 MMOL/L (ref 4–13)
APTT PPP: 32 SECONDS (ref 23–37)
BASOPHILS # BLD AUTO: 0.07 THOUSANDS/ΜL (ref 0–0.1)
BASOPHILS NFR BLD AUTO: 1 % (ref 0–1)
BUN SERPL-MCNC: 42 MG/DL (ref 5–25)
CALCIUM SERPL-MCNC: 8.8 MG/DL (ref 8.3–10.1)
CHLORIDE SERPL-SCNC: 105 MMOL/L (ref 100–108)
CO2 SERPL-SCNC: 26 MMOL/L (ref 21–32)
CREAT SERPL-MCNC: 1.39 MG/DL (ref 0.6–1.3)
EOSINOPHIL # BLD AUTO: 0.3 THOUSAND/ΜL (ref 0–0.61)
EOSINOPHIL NFR BLD AUTO: 3 % (ref 0–6)
ERYTHROCYTE [DISTWIDTH] IN BLOOD BY AUTOMATED COUNT: 15.9 % (ref 11.6–15.1)
ERYTHROCYTE [DISTWIDTH] IN BLOOD BY AUTOMATED COUNT: 16.2 % (ref 11.6–15.1)
GFR SERPL CREATININE-BSD FRML MDRD: 52 ML/MIN/1.73SQ M
GLUCOSE SERPL-MCNC: 134 MG/DL (ref 65–140)
GLUCOSE SERPL-MCNC: 160 MG/DL (ref 65–140)
GLUCOSE SERPL-MCNC: 171 MG/DL (ref 65–140)
GLUCOSE SERPL-MCNC: 180 MG/DL (ref 65–140)
GLUCOSE SERPL-MCNC: 180 MG/DL (ref 65–140)
HCT VFR BLD AUTO: 30.8 % (ref 36.5–49.3)
HCT VFR BLD AUTO: 31.5 % (ref 36.5–49.3)
HGB BLD-MCNC: 9 G/DL (ref 12–17)
HGB BLD-MCNC: 9.2 G/DL (ref 12–17)
IMM GRANULOCYTES # BLD AUTO: 0.25 THOUSAND/UL (ref 0–0.2)
IMM GRANULOCYTES NFR BLD AUTO: 3 % (ref 0–2)
INR PPP: 1.19 (ref 0.84–1.19)
LYMPHOCYTES # BLD AUTO: 1.21 THOUSANDS/ΜL (ref 0.6–4.47)
LYMPHOCYTES NFR BLD AUTO: 12 % (ref 14–44)
MCH RBC QN AUTO: 27.2 PG (ref 26.8–34.3)
MCH RBC QN AUTO: 27.3 PG (ref 26.8–34.3)
MCHC RBC AUTO-ENTMCNC: 29.2 G/DL (ref 31.4–37.4)
MCHC RBC AUTO-ENTMCNC: 29.2 G/DL (ref 31.4–37.4)
MCV RBC AUTO: 93 FL (ref 82–98)
MCV RBC AUTO: 93 FL (ref 82–98)
MONOCYTES # BLD AUTO: 0.76 THOUSAND/ΜL (ref 0.17–1.22)
MONOCYTES NFR BLD AUTO: 8 % (ref 4–12)
NEUTROPHILS # BLD AUTO: 7.23 THOUSANDS/ΜL (ref 1.85–7.62)
NEUTS SEG NFR BLD AUTO: 73 % (ref 43–75)
NRBC BLD AUTO-RTO: 0 /100 WBCS
PLATELET # BLD AUTO: 261 THOUSANDS/UL (ref 149–390)
PLATELET # BLD AUTO: 308 THOUSANDS/UL (ref 149–390)
PMV BLD AUTO: 10.2 FL (ref 8.9–12.7)
PMV BLD AUTO: 9.5 FL (ref 8.9–12.7)
POTASSIUM SERPL-SCNC: 5.1 MMOL/L (ref 3.5–5.3)
PROTHROMBIN TIME: 14.8 SECONDS (ref 11.6–14.5)
RBC # BLD AUTO: 3.3 MILLION/UL (ref 3.88–5.62)
RBC # BLD AUTO: 3.38 MILLION/UL (ref 3.88–5.62)
SODIUM SERPL-SCNC: 138 MMOL/L (ref 136–145)
VANCOMYCIN SERPL-MCNC: 31.1 UG/ML
VANCOMYCIN TROUGH SERPL-MCNC: 42.3 UG/ML (ref 10–20)
WBC # BLD AUTO: 9.82 THOUSAND/UL (ref 4.31–10.16)
WBC # BLD AUTO: 9.92 THOUSAND/UL (ref 4.31–10.16)

## 2019-08-01 PROCEDURE — 85730 THROMBOPLASTIN TIME PARTIAL: CPT | Performed by: INTERNAL MEDICINE

## 2019-08-01 PROCEDURE — 99232 SBSQ HOSP IP/OBS MODERATE 35: CPT | Performed by: INTERNAL MEDICINE

## 2019-08-01 PROCEDURE — 97530 THERAPEUTIC ACTIVITIES: CPT

## 2019-08-01 PROCEDURE — 97167 OT EVAL HIGH COMPLEX 60 MIN: CPT

## 2019-08-01 PROCEDURE — 80202 ASSAY OF VANCOMYCIN: CPT | Performed by: HOSPITALIST

## 2019-08-01 PROCEDURE — 80048 BASIC METABOLIC PNL TOTAL CA: CPT | Performed by: PHYSICIAN ASSISTANT

## 2019-08-01 PROCEDURE — 85027 COMPLETE CBC AUTOMATED: CPT | Performed by: INTERNAL MEDICINE

## 2019-08-01 PROCEDURE — 85025 COMPLETE CBC W/AUTO DIFF WBC: CPT | Performed by: PHYSICIAN ASSISTANT

## 2019-08-01 PROCEDURE — 97163 PT EVAL HIGH COMPLEX 45 MIN: CPT

## 2019-08-01 PROCEDURE — G8979 MOBILITY GOAL STATUS: HCPCS

## 2019-08-01 PROCEDURE — 85610 PROTHROMBIN TIME: CPT | Performed by: INTERNAL MEDICINE

## 2019-08-01 PROCEDURE — 99024 POSTOP FOLLOW-UP VISIT: CPT | Performed by: PHYSICIAN ASSISTANT

## 2019-08-01 PROCEDURE — G8987 SELF CARE CURRENT STATUS: HCPCS

## 2019-08-01 PROCEDURE — G8988 SELF CARE GOAL STATUS: HCPCS

## 2019-08-01 PROCEDURE — 82948 REAGENT STRIP/BLOOD GLUCOSE: CPT

## 2019-08-01 PROCEDURE — G8978 MOBILITY CURRENT STATUS: HCPCS

## 2019-08-01 RX ORDER — FUROSEMIDE 10 MG/ML
40 INJECTION INTRAMUSCULAR; INTRAVENOUS DAILY
Status: DISCONTINUED | OUTPATIENT
Start: 2019-08-01 | End: 2019-08-01

## 2019-08-01 RX ORDER — LOSARTAN POTASSIUM 50 MG/1
50 TABLET ORAL DAILY
Status: DISCONTINUED | OUTPATIENT
Start: 2019-08-01 | End: 2019-08-03

## 2019-08-01 RX ORDER — FUROSEMIDE 10 MG/ML
80 INJECTION INTRAMUSCULAR; INTRAVENOUS
Status: DISCONTINUED | OUTPATIENT
Start: 2019-08-01 | End: 2019-08-04

## 2019-08-01 RX ORDER — HYDROCODONE BITARTRATE AND ACETAMINOPHEN 5; 325 MG/1; MG/1
2 TABLET ORAL EVERY 4 HOURS PRN
Status: DISCONTINUED | OUTPATIENT
Start: 2019-08-01 | End: 2019-08-12 | Stop reason: HOSPADM

## 2019-08-01 RX ORDER — HEPARIN SODIUM 10000 [USP'U]/100ML
3-20 INJECTION, SOLUTION INTRAVENOUS
Status: DISCONTINUED | OUTPATIENT
Start: 2019-08-01 | End: 2019-08-03 | Stop reason: SDUPTHER

## 2019-08-01 RX ORDER — WARFARIN SODIUM 5 MG/1
5 TABLET ORAL
Status: DISCONTINUED | OUTPATIENT
Start: 2019-08-01 | End: 2019-08-04

## 2019-08-01 RX ADMIN — HYDROMORPHONE HYDROCHLORIDE 1 MG: 1 INJECTION, SOLUTION INTRAMUSCULAR; INTRAVENOUS; SUBCUTANEOUS at 00:28

## 2019-08-01 RX ADMIN — INSULIN LISPRO 30 UNITS: 100 INJECTION, SOLUTION INTRAVENOUS; SUBCUTANEOUS at 18:26

## 2019-08-01 RX ADMIN — FLUTICASONE FUROATE AND VILANTEROL TRIFENATATE 1 PUFF: 100; 25 POWDER RESPIRATORY (INHALATION) at 08:26

## 2019-08-01 RX ADMIN — INSULIN LISPRO 2 UNITS: 100 INJECTION, SOLUTION INTRAVENOUS; SUBCUTANEOUS at 08:27

## 2019-08-01 RX ADMIN — HYDROMORPHONE HYDROCHLORIDE 0.5 MG: 1 INJECTION, SOLUTION INTRAMUSCULAR; INTRAVENOUS; SUBCUTANEOUS at 15:37

## 2019-08-01 RX ADMIN — ATORVASTATIN CALCIUM 20 MG: 20 TABLET, FILM COATED ORAL at 08:25

## 2019-08-01 RX ADMIN — INSULIN LISPRO 30 UNITS: 100 INJECTION, SOLUTION INTRAVENOUS; SUBCUTANEOUS at 08:27

## 2019-08-01 RX ADMIN — HEPARIN SODIUM 5000 UNITS: 5000 INJECTION, SOLUTION INTRAVENOUS; SUBCUTANEOUS at 05:01

## 2019-08-01 RX ADMIN — HYDROMORPHONE HYDROCHLORIDE 1 MG: 1 INJECTION, SOLUTION INTRAMUSCULAR; INTRAVENOUS; SUBCUTANEOUS at 04:28

## 2019-08-01 RX ADMIN — METOPROLOL TARTRATE 25 MG: 25 TABLET, FILM COATED ORAL at 21:29

## 2019-08-01 RX ADMIN — INSULIN LISPRO 2 UNITS: 100 INJECTION, SOLUTION INTRAVENOUS; SUBCUTANEOUS at 18:26

## 2019-08-01 RX ADMIN — METOPROLOL TARTRATE 25 MG: 25 TABLET, FILM COATED ORAL at 08:25

## 2019-08-01 RX ADMIN — HYDROMORPHONE HYDROCHLORIDE 0.5 MG: 1 INJECTION, SOLUTION INTRAMUSCULAR; INTRAVENOUS; SUBCUTANEOUS at 21:32

## 2019-08-01 RX ADMIN — WARFARIN SODIUM 5 MG: 5 TABLET ORAL at 18:38

## 2019-08-01 RX ADMIN — CEFEPIME HYDROCHLORIDE 2000 MG: 2 INJECTION, SOLUTION INTRAVENOUS at 03:26

## 2019-08-01 RX ADMIN — HEPARIN SODIUM 5000 UNITS: 5000 INJECTION, SOLUTION INTRAVENOUS; SUBCUTANEOUS at 13:29

## 2019-08-01 RX ADMIN — HYDROCODONE BITARTRATE AND ACETAMINOPHEN 2 TABLET: 5; 325 TABLET ORAL at 04:46

## 2019-08-01 RX ADMIN — ALLOPURINOL 300 MG: 300 TABLET ORAL at 08:25

## 2019-08-01 RX ADMIN — INSULIN GLARGINE 14 UNITS: 100 INJECTION, SOLUTION SUBCUTANEOUS at 21:30

## 2019-08-01 RX ADMIN — HYDROMORPHONE HYDROCHLORIDE 1 MG: 1 INJECTION, SOLUTION INTRAMUSCULAR; INTRAVENOUS; SUBCUTANEOUS at 07:51

## 2019-08-01 RX ADMIN — HEPARIN SODIUM AND DEXTROSE 11.1 UNITS/KG/HR: 10000; 5 INJECTION INTRAVENOUS at 15:58

## 2019-08-01 RX ADMIN — CEFEPIME HYDROCHLORIDE 2000 MG: 2 INJECTION, SOLUTION INTRAVENOUS at 15:58

## 2019-08-01 RX ADMIN — FUROSEMIDE 80 MG: 10 INJECTION, SOLUTION INTRAVENOUS at 16:02

## 2019-08-01 RX ADMIN — LOSARTAN POTASSIUM 50 MG: 50 TABLET, FILM COATED ORAL at 08:25

## 2019-08-01 RX ADMIN — INSULIN LISPRO 30 UNITS: 100 INJECTION, SOLUTION INTRAVENOUS; SUBCUTANEOUS at 13:29

## 2019-08-01 RX ADMIN — FUROSEMIDE 40 MG: 10 INJECTION, SOLUTION INTRAVENOUS at 08:25

## 2019-08-01 NOTE — PROGRESS NOTES
Progress Note - Hang Anel 1950, 79 y o  male MRN: [id number]  Unit/Bed#: [address] [place] [address] Encounter: [id number]  Primary Care Provider: Mango Simon MD   Date and time admitted to hospital: 7/25/2019  1:04 PM        * Type 2 diabetes mellitus with left diabetic foot ulcer [facility])  Assessment & Plan  Lab Results   Component Value Date    HGBA1C 9 4 (H) 12/27/2018       Recent Labs     07/26/19  1106 07/26/19  1627 07/26/19  2138 07/27/19  0749   POCGLU 151* 105 64* 136     · 07/31/2019: Left AKA  · Continue IV Vanco/Cefepime as per ID  · Continue Novolog/Lantus/sliding scale coverage  · Podiatry, ID general surgery on board  · Continue with current pain regimen  Chronic venous stasis dermatitis of both lower extremities  Assessment & Plan  · Chronic, POA  · podiatry/wound care  Chronic diastolic congestive heart failure [facility])  Assessment & Plan  Wt Readings from Last 3 Encounters:   07/29/19 (!) 166 kg (366 lb 13 5 oz)   07/02/19 (!) 175 kg (386 lb)   06/10/19 (!) 171 kg (378 lb)     · Continue beta-blockers/Aldactone/IV Lasix  Chronic atrial fibrillation (HCC)  Assessment & Plan  · Heparin drip on hold, status post AKA  · Will discuss with surgery regarding restarting this medication  · Continue Lopressor  · Appreciate input from Cardiology  Essential hypertension  Assessment & Plan  · continue beta-blocker/Aldactone/Cozaar    Gout  Assessment & Plan  · continue allopurinol    Moderate persistent asthma without complication  Assessment & Plan  · cont albuterol/Advair    Morbid obesity ([facility] [address] )  Assessment & Plan  · Encourage weight loss    Acute renal failure superimposed on stage 3 chronic kidney disease ([facility] [address] )  Assessment & Plan  · This has resolved  · This is most likely from aggressive IV diuresis, episodes of hypotension  · Will resume IV Lasix for surgery  · Ultrasound renals = No evidence of nephrolithiasis or hydronephrosis  Stable left renal cyst measuring 1 3 cm  Unremarkable bladder  VTE PROPHYLAXIS:  Heparin subcu + SCD  EDUCATION AND DISCUSSIONS WITH PATIENT/FAMILY: Patient updated  CURRENT LENGTH OF STAY: 7     CURRENT PATIENT STATUS: Inpatient     CERTIFICATION STATEMENT: The patient will continue to require additional inpatient hospital stay due to ongoing goals of care discussion as mentioned below  PATIENT CENTERED ROUNDS: I have performed bedside rounds with nursing staff today  ESTIMATED DISCHARGE DATE:  Anticipate discharge next 4-5 days  CODE STATUS: Level 1 - Full Code      ______________________________________________________________________    SUBJECTIVE:   Patient seen and examined  POD#1: left AKA  Patient complaining of pain in amputated site  Mild shortness of breath  OBJECTIVE:     Vitals:   Temp:  [97 3 °F (36 3 °C)-98 6 °F (37 °C)] 98 6 °F (37 °C)  HR:  [56-78] 78  Resp:  [18-20] 18  BP: (110-166)/(53-80) 166/79  SpO2:  [89 %-95 %] 89 %  Temp (24hrs), Av 9 °F (36 6 °C), Min:97 3 °F (36 3 °C), Max:98 6 °F (37 °C)  Current: Temperature: 98 6 °F (37 °C)    Intake/Output Summary (Last 24 hours) at 2019 0950  Last data filed at 2019 1053  Gross per 24 hour   Intake 600 ml   Output --   Net 600 ml       Physical Exam:   GENERAL: AAO x 3  Morbid obesity  HEENT: atraumatic, normocephalic  Oral mucosa moist, no icterus, pallor  PERRLA +  Neck supple, no JVD, no lymphadenopathy, no thryomegaly  CHEST: B/L breath sounds heard, occasional wheezing  CVS: S1, S2   ABDOMEN: Soft/obese/NT/BS heard  NEUROLOGICAL: CN II -XI grossly intact  No focal motor or sensory deficits  No signs of meningeal irritation or cerebellar dysfunction  EXTREMITIES:  Left AKA dressing intact  Right lower extremity pitting pedal edema secondary chronic lymphedema      LABS:     2019 04:57 2019 08:26   Sodium 138    Potassium 5 1    Chloride 105    CO2 26    Anion Gap 7    BUN 42 (H)    Creatinine 1 39 (H)    Glucose, Random 171 (H) Calcium 8 8    eGFR 52    WBC 9 82    Red Blood Cell Count 3 38 (L)    Hemoglobin 9 2 (L)    HCT 31 5 (L)    MCV 93    MCH 27 2    MCHC 29 2 (L)    RDW 15 9 (H)    Platelet Count 516    MPV 10 2    nRBC 0    Neutrophils % 73    Immat GRANS % 3 (H)    Lymphocytes Relative 12 (L)    Monocytes Relative 8    Eosinophils 3    Basophils Relative 1    Immature Grans Absolute 0 25 (H)    Absolute Neutrophils 7 23    Lymphocytes Absolute 1 21    Absolute Monocytes 0 76    Absolute Eosinophils 0 30    Basophils Absolute 0 07      Scheduled Meds[de-identified]    Current Facility-Administered Medications:  acetaminophen 650 mg Oral Q6H PRN Suzette Astl-Raymond, PA-C    albuterol 2 puff Inhalation Q6H PRN Suzette Astl-Raymond, PA-C    allopurinol 300 mg Oral Daily Suzette Astl-Raymond, PA-C    atorvastatin 20 mg Oral Daily Suzette Astl-Raymond, PA-C    cefepime 2,000 mg Intravenous Q12H Suzette Astl-Raymond, PA-C Last Rate: 2,000 mg (08/01/19 0326)   fluticasone-vilanterol 1 puff Inhalation Daily Suzette Astl-Raymond, PA-C    furosemide 40 mg Intravenous Daily Jessica Decker MD    heparin (porcine) 5,000 Units Subcutaneous Q8H [address] Suzette Astl-Raymond, PA-C    HYDROcodone-acetaminophen 2 tablet Oral Q4H PRN Dalila Mora PA-C    HYDROmorphone 0 5 mg Intravenous Q2H PRN Dalila Mora PA-C    insulin glargine 14 Units Subcutaneous HS Suzette Arauz-Raymond, PA-C    insulin lispro 2-12 Units Subcutaneous TID AC Suzette Astl-Raymond, PA-C    insulin lispro 30 Units Subcutaneous TID With Meals Cherri Arauz-Raymond, PA-C    losartan 50 mg Oral Daily Jessica Alegre MD    metoprolol tartrate 25 mg Oral Q12H [address] Suzette Astl-Raymond, PA-C    vancomycin 750 mg Intravenous Once PRN Papo Tracy MD      Continuous Infusion: NONE  PRN Meds    acetaminophen    albuterol    HYDROcodone-acetaminophen    HYDROmorphone    vancomycin    Time Spent for Care: 20 minutes    More than 50% of total time spent on counseling and coordination of care as described above  Francisco Jauregui MD  HOSPITALIST SERVICES  8/1/2019    PLEASE NOTE:  This encounter was completed utilizing the [employer] Direct Speech Voice Recognition Software  Grammatical errors, random word insertions, pronoun errors and incomplete sentences are occasional consequences of the system due to software limitations, ambient noise and hardware issues  These may be missed by proof reading prior to affixing electronic signature  Any questions or concerns about the content, text or information contained within the body of this dictation should be directly addressed to the physician for clarification  Please do not hesitate to call me directly if you have any any questions or concerns

## 2019-08-01 NOTE — PLAN OF CARE
Problem: PHYSICAL THERAPY ADULT  Goal: Performs mobility at highest level of function for planned discharge setting  See evaluation for individualized goals  Description  Treatment/Interventions: ADL retraining, Functional transfer training, LE strengthening/ROM, Therapeutic exercise, Endurance training, Patient/family training, Bed mobility, OT, Spoke to nursing  Equipment Recommended: Danica Urbina, Wheelchair       See flowsheet documentation for full assessment, interventions and recommendations  Outcome: Progressing  Note:   Prognosis: Good  Problem List: Decreased strength, Decreased endurance, Decreased range of motion, Impaired balance, Decreased mobility, Decreased coordination, Orthopedic restrictions, Decreased skin integrity, Decreased safety awareness  Assessment: Pt able to sit with rle over edge of bed x 2min then repositioned to try edge o bed with only partial success  REturned to bed and in high Fowlers with need sin reach  Instr in desensitization L AKA  Will need in-pt rehab atd/c to regian safe mobility  Will follow see goals  Barriers to Discharge: Inaccessible home environment, Decreased caregiver support     Recommendation: Short-term skilled PT     PT - OK to Discharge: Yes    See flowsheet documentation for full assessment

## 2019-08-01 NOTE — PROGRESS NOTES
Cardiology Progress Note - Nancy Hernández 79 y o  male MRN: [id number]    Unit/Bed#: [address] [place] [address] Encounter: [id number]      Assessment:  Principal Problem:    Type 2 diabetes mellitus with left diabetic foot ulcer ([address] )  Active Problems:    Gout    Chronic venous stasis dermatitis of both lower extremities    Essential hypertension    Chronic atrial fibrillation (HCC)    Morbid obesity (HCC)    Chronic diastolic congestive heart failure (HCC)    Moderate persistent asthma without complication    Acute renal failure superimposed on stage 3 chronic kidney disease (HCC)    Skin ulcer of left foot with necrosis of muscle (HCC)    Left leg cellulitis      Plan:    1  Chronic diastolic CHF - David Radish is showing increasing volume overload postoperatively  He was started on Lasix 40 mg IV daily  We will increase this to 80 mg twice daily and give him a dose now  Continue to follow urine output, daily labs and weight closely  Weights do appear to be inaccurate  2  Persistent atrial fibrillation - Heart rates are controlled  Patient to restart anticoagulation, as he does take warfarin for stroke prevention as an outpatient  3  POD #1 - AKA - Associated a non healing foot ulcer  Subjective:   Patient seen and examined  No significant events overnight  POD1 - S/P AKA  Patient feeling well, though is having some shortness of breath and signs of volume overload postoperatively  Weights appear to be inaccurate  Objective:     Vitals: Blood pressure 140/65, pulse 67, temperature 98 6 °F (37 °C), temperature source Oral, resp   rate 17, height 6' 3" (1 905 m), weight (!) 178 kg (393 lb 4 8 oz), SpO2 (!) 88 % , Body mass index is 49 16 kg/m² ,   Orthostatic Blood Pressures      Most Recent Value   Blood Pressure  140/65 filed at 08/01/2019 1052   Patient Position - Orthostatic VS  Lying filed at 08/01/2019 1052          No intake or output data in the 24 hours ending 08/01/19 6714    Telemetry review: Atrial fibrillation with controlled ventricular response    Physical Exam:    GEN: Sally Sam appears well, alert and oriented x 3, pleasant and cooperative, obese  HEENT: pupils equal, round, and reactive to light; extraocular muscles intact  NECK: supple, no carotid bruits   +JVD   HEART: regular rhythm, distant and irregular S1 and S2, no significant murmurs, clicks, gallops or rubs   LUNGS:  Diminished bilaterally; no wheezes, rales, or rhonchi   ABDOMEN: normal bowel sounds, soft, no tenderness, no distention  EXTREMITIES:  Status post left-sided AKA, 2+ right lower extremity edema  NEURO: no focal findings   SKIN: normal without suspicious lesions on exposed skin    Medications:      Current Facility-Administered Medications:     acetaminophen (TYLENOL) tablet 650 mg, 650 mg, Oral, Q6H PRN, Alexsander Arauz-Raymond PA-C    albuterol (PROVENTIL HFA,VENTOLIN HFA) inhaler 2 puff, 2 puff, Inhalation, Q6H PRN, Alexsander Arauz-Raymond PA-C, 2 puff at 07/31/19 0749    allopurinol (ZYLOPRIM) tablet 300 mg, 300 mg, Oral, Daily, Suzette Astl-Raymond, PA-C, 300 mg at 08/01/19 0825    atorvastatin (LIPITOR) tablet 20 mg, 20 mg, Oral, Daily, Suzette Astl-Raymond, PA-C, 20 mg at 08/01/19 0825    cefepime (MAXIPIME) 2 g/50 mL dextrose IVPB, 2,000 mg, Intravenous, Q12H, Suzette Astlorin-Raymond, PA-C, Last Rate: 100 mL/hr at 08/01/19 0326, 2,000 mg at 08/01/19 0326    fluticasone-vilanterol (BREO ELLIPTA) 100-25 mcg/inh inhaler 1 puff, 1 puff, Inhalation, Daily, Suzette Astl-Raymond, PA-C, 1 puff at 08/01/19 0826    furosemide (LASIX) injection 40 mg, 40 mg, Intravenous, Daily, Jessica Mendez MD, 40 mg at 08/01/19 0825    heparin (porcine) 25,000 units in 250 mL infusion (premix), 3-20 Units/kg/hr (Order-Specific), Intravenous, Titrated, Jessica Odom MD    HYDROcodone-acetaminophen (NORCO) 5-325 mg per tablet 2 tablet, 2 tablet, Oral, Q4H PRN, Dalila Mora PA-C    HYDROmorphone (DILAUDID) injection 0 5 mg, 0 5 mg, Intravenous, Q2H PRN, Dalila Mora PA-C, 0 5 mg at 07/31/19 1714    insulin glargine (LANTUS) subcutaneous injection 14 Units 0 14 mL, 14 Units, Subcutaneous, HS, Suzette Sánchez PA-C, 14 Units at 07/31/19 2150    insulin lispro (HumaLOG) 100 units/mL subcutaneous injection 2-12 Units, 2-12 Units, Subcutaneous, TID AC, 2 Units at 08/01/19 0827 **AND** Fingerstick Glucose (POCT), , , TID AC, Suzette Sánchez PA-C    insulin lispro (HumaLOG) 100 units/mL subcutaneous injection 30 Units, 30 Units, Subcutaneous, TID With Meals, Recavalerio Sánchez PA-C, 30 Units at 08/01/19 1329    losartan (COZAAR) tablet 50 mg, 50 mg, Oral, Daily, [facility] F[facility], 50 mg at 08/01/19 0825    metoprolol tartrate (LOPRESSOR) tablet 25 mg, 25 mg, Oral, Q12H [address], Suzette Sánchez PA-C, 25 mg at 08/01/19 0825    vancomycin (VANCOCIN) IVPB (premix) 750 mg, 750 mg, Intravenous, Once PRN, Wendy Lee MD    warfarin (COUMADIN) tablet 5 mg, 5 mg, Oral, Daily (warfarin), Tommy Kwon MD     Labs & Results:        Results from last 7 days   Lab Units 08/01/19  0457 07/31/19  0617 07/30/19  0520   WBC Thousand/uL 9 82 6 98 7 57   HEMOGLOBIN g/dL 9 2* 9 5* 9 7*   HEMATOCRIT % 31 5* 32 2* 32 8*   PLATELETS Thousands/uL 261 246 356         Results from last 7 days   Lab Units 08/01/19  0457 07/31/19  0617 07/30/19  0520   POTASSIUM mmol/L 5 1 4 5 4 4   CHLORIDE mmol/L 105 105 103   CO2 mmol/L 26 30 27   BUN mg/dL 42* 54* 66*   CREATININE mg/dL 1 39* 1 45* 1 85*   CALCIUM mg/dL 8 8 8 5 8 3     Results from last 7 days   Lab Units 07/31/19  0621 07/31/19  0617 07/30/19  0819 07/30/19  0142  07/29/19  0648  07/27/19  1047   INR   --  1 28*  --   --   --  1 47*  --  1 72*   PTT seconds 32  --  60* 65*   < > 53*   < >  --     < > = values in this interval not displayed               EKG personally reviewed by Wendy Jarvis MD   Atrial fibrillation with an incomplete right bundle branch block     ECHO(4/2019):  LEFT VENTRICLE:  Systolic function was normal  Ejection fraction was estimated to be 60 %  There were no regional wall motion abnormalities  There was mild concentric hypertrophy  Doppler parameters were consistent with a reversible restrictive pattern, indicative of decreased left ventricular diastolic compliance and/or increased left atrial pressure (grade 3 diastolic dysfunction)      RIGHT VENTRICLE:  The ventricle was mildly dilated  Systolic function was normal      LEFT ATRIUM:  The atrium was mildly dilated      RIGHT ATRIUM:  The atrium was mildly dilated      MITRAL VALVE:  There was mild annular calcification  There was trace regurgitation  Counseling / Coordination of Care  Total floor / unit time spent today 25 minutes  Greater than 50% of total time was spent with the patient and / or family counseling and / or coordination of care

## 2019-08-01 NOTE — PLAN OF CARE
Problem: Potential for Falls  Goal: Patient will remain free of falls  Description  INTERVENTIONS:  - Assess patient frequently for physical needs  -  Identify cognitive and physical deficits and behaviors that affect risk of falls    -  [place] fall precautions as indicated by assessment   - Educate patient/family on patient safety including physical limitations  - Instruct patient to call for assistance with activity based on assessment  - Modify environment to reduce risk of injury  - Consider OT/PT consult to assist with strengthening/mobility  Outcome: Progressing     Problem: Prexisting or High Potential for Compromised Skin Integrity  Goal: Skin integrity is maintained or improved  Description  INTERVENTIONS:  - Identify patients at risk for skin breakdown  - Assess and monitor skin integrity  - Assess and monitor nutrition and hydration status  - Monitor labs (i e  albumin)  - Assess for incontinence   - Turn and reposition patient  - Assist with mobility/ambulation  - Relieve pressure over bony prominences  - Avoid friction and shearing  - Provide appropriate hygiene as needed including keeping skin clean and dry  - Evaluate need for skin moisturizer/barrier cream  - Collaborate with interdisciplinary team (i e  Nutrition, Rehabilitation, etc )   - Patient/family teaching  Outcome: Progressing     Problem: SKIN/TISSUE INTEGRITY - ADULT  Goal: Skin integrity remains intact  Description  INTERVENTIONS  - Identify patients at risk for skin breakdown  - Assess and monitor skin integrity  - Assess and monitor nutrition and hydration status  - Monitor labs (i e  albumin)  - Assess for incontinence   - Turn and reposition patient  - Assist with mobility/ambulation  - Relieve pressure over bony prominences  - Avoid friction and shearing  - Provide appropriate hygiene as needed including keeping skin clean and dry  - Evaluate need for skin moisturizer/barrier cream  - Collaborate with interdisciplinary team (i e  Nutrition, Rehabilitation, etc )   - Patient/family teaching  Outcome: Progressing  Goal: Incision(s), wounds(s) or drain site(s) healing without S/S of infection  Description  INTERVENTIONS  - Assess and document risk factors for skin impairment   - Assess and document dressing, incision, wound bed, drain sites and surrounding tissue  - Initiate Nutrition services consult and/or wound management as needed  Outcome: Progressing     Problem: MUSCULOSKELETAL - ADULT  Goal: Maintain or return mobility to safest level of function  Description  INTERVENTIONS:  - Assess patient's ability to carry out ADLs; assess patient's baseline for ADL function and identify physical deficits which impact ability to perform ADLs (bathing, care of mouth/teeth, toileting, grooming, dressing, etc )  - Assess/evaluate cause of self-care deficits   - Assess range of motion  - Assess patient's mobility; develop plan if impaired  - Assess patient's need for assistive devices and provide as appropriate  - Encourage maximum independence but intervene and supervise when necessary  - Involve family in performance of ADLs  - Assess for home care needs following discharge   - Request OT consult to assist with ADL evaluation and planning for discharge  - Provide patient education as appropriate  Outcome: Progressing     Problem: PAIN - ADULT  Goal: Verbalizes/displays adequate comfort level or baseline comfort level  Description  Interventions:  - Encourage patient to monitor pain and request assistance  - Assess pain using appropriate pain scale  - Administer analgesics based on type and severity of pain and evaluate response  - Implement non-pharmacological measures as appropriate and evaluate response  - Consider cultural and social influences on pain and pain management  - Notify physician/advanced practitioner if interventions unsuccessful or patient reports new pain  Outcome: Progressing     Problem: INFECTION - ADULT  Goal: Absence or prevention of progression during hospitalization  Description  INTERVENTIONS:  - Assess and monitor for signs and symptoms of infection  - Monitor lab/diagnostic results  - Monitor all insertion sites, i e  indwelling lines, tubes, and drains  - Monitor endotracheal (as able) and nasal secretions for changes in amount and color  - [place] appropriate cooling/warming therapies per order  - Administer medications as ordered  - Instruct and encourage patient and family to use good hand hygiene technique  - Identify and instruct in appropriate isolation precautions for identified infection/condition  Outcome: Progressing     Problem: SAFETY ADULT  Goal: Patient will remain free of falls  Description  INTERVENTIONS:  - Assess patient frequently for physical needs  -  Identify cognitive and physical deficits and behaviors that affect risk of falls    -  [place] fall precautions as indicated by assessment   - Educate patient/family on patient safety including physical limitations  - Instruct patient to call for assistance with activity based on assessment  - Modify environment to reduce risk of injury  - Consider OT/PT consult to assist with strengthening/mobility  Outcome: Progressing     Problem: DISCHARGE PLANNING  Goal: Discharge to home or other facility with appropriate resources  Description  INTERVENTIONS:  - Identify barriers to discharge w/patient and caregiver  - Arrange for needed discharge resources and transportation as appropriate  - Identify discharge learning needs (meds, wound care, etc )  - Arrange for interpretive services to assist at discharge as needed  - Refer to Case Management Department for coordinating discharge planning if the patient needs post-hospital services based on physician/advanced practitioner order or complex needs related to functional status, cognitive ability, or social support system  Outcome: Progressing     Problem: Knowledge Deficit  Goal: Patient/family/caregiver demonstrates understanding of disease process, treatment plan, medications, and discharge instructions  Description  Complete learning assessment and assess knowledge base  Interventions:  - Provide teaching at level of understanding  - Provide teaching via preferred learning methods  Outcome: Progressing     Problem: Nutrition/Hydration-ADULT  Goal: Nutrient/Hydration intake appropriate for improving, restoring or maintaining nutritional needs  Description  Monitor and assess patient's nutrition/hydration status for malnutrition (ex- brittle hair, bruises, dry skin, pale skin and conjunctiva, muscle wasting, smooth red tongue, and disorientation)  Collaborate with interdisciplinary team and initiate plan and interventions as ordered  Monitor patient's weight and dietary intake as ordered or per policy  Utilize nutrition screening tool and intervene per policy  Determine patient's food preferences and provide high-protein, high-caloric foods as appropriate       INTERVENTIONS:  - Monitor oral intake, urinary output, labs, and treatment plans  - Assess nutrition and hydration status and recommend course of action  - Evaluate amount of meals eaten  - Assist patient with eating if necessary   - Allow adequate time for meals  - Recommend/ encourage appropriate diets, oral nutritional supplements, and vitamin/mineral supplements  - Order, calculate, and assess calorie counts as needed  - Recommend, monitor, and adjust tube feedings and TPN/PPN based on assessed needs  - Assess need for intravenous fluids  - Provide specific nutrition/hydration education as appropriate  - Include patient/family/caregiver in decisions related to nutrition  Outcome: Progressing

## 2019-08-01 NOTE — PLAN OF CARE
Problem: OCCUPATIONAL THERAPY ADULT  Goal: Performs self-care activities at highest level of function for planned discharge setting  See evaluation for individualized goals  Description  Treatment Interventions: ADL retraining, Functional transfer training, UE strengthening/ROM, Endurance training, Patient/family training, Equipment evaluation/education, Compensatory technique education, Energy conservation, Activityengagement          See flowsheet documentation for full assessment, interventions and recommendations  Outcome: Progressing  Note:   Limitation: Decreased ADL status, Decreased UE strength, Decreased Safe judgement during ADL, Decreased endurance, Decreased self-care trans, Decreased high-level ADLs  Prognosis: Good  Assessment: Pt is a 79 y o  male seen for OT evaluation s/p adm to [facility] on [facility]/25/2019 w/ B/L severe LE wounds and SOB  Pt now s/p L AKA on 7/31/19  Comorbidities affecting pts functional performance include a significant PMH of CHF, COPD, DM, HTN, and neuropathy  Pt with active OT orders and activity orders for Up with assistance  Pt lives in a multi-level house with 0 RAYMUNDO and FOS to 2nd floor, however pt reports house is currently being sold  At baseline, pt was I w/ ADLs, IADLs, and functional mobility/transfers w/o use of AD, (+) , and denies falls PTA  Upon evaluation, pt currently requires Min A for UB ADLs, Max-Mod A for LB ADLs, Max A for toileting, and Max-Mod A of 2 for bed mobility (OOB not assessed at this time) 2* the following deficits impacting occupational performance: weakness, decreased strength, decreased balance, decreased tolerance, decreased safety awareness and increased pain  These impairments, as well at pts difficulty performing ADLS, difficulty performing IADLS  and limited insight into deficits limit pts ability to safely engage in all baseline areas of occupation  Pt scored overall 40/100 on the Barthel Index   Based on the aforementioned OT evaluation, functional performance deficits, and assessments, pt has been identified as a High complexity evaluation  Pt to continue to benefit from continued acute OT services during hospital stay to address defined deficits and to maximize level of functional independence in the following Occupational Performance areas: grooming, bathing/shower, toilet hygiene, dressing, health maintenance, functional mobility, community mobility, clothing management, cleaning, meal prep, household maintenance and social participation  From OT standpoint, recommend STR upon D/C   OT will continue to follow pt 2-3x/wk to address the following goals to  w/in 10-14 days:     OT Discharge Recommendation: Short Term Rehab  OT - OK to Discharge: Yes(when medically cleared to rehab)

## 2019-08-01 NOTE — PROGRESS NOTES
Vancomycin IV Pharmacy-to-Dose Consultation    Valerie Contreras is a 79 y o  obese male who is currently not receiving Vancomycin secondary to a supratherapeutic  Vancomycin is being managed via Pharmacy Consult service  Indication is skin/soft tissue infection with a goal Vanco trough of 15-20  Assessment/Plan:  The patient was reviewed  Renal function is stable and no signs or symptoms of nephrotoxicity and/or infusion reactions were documented in the chart  A correctly time random Vancomycin level ordered for today returned as 31 3 (8/1 @4708)  Based on todays assessment, patient will remain off of Vancomycin until level is less than 20  Additionally, per Dr Tito Simpson (ID ) progress note "will discontinue IV antibiotics tomorrow evening " Will order another random Vancomycin level for 8/2/19 @0030  Pharmacy will continue to follow the patients culture results and clinical progress daily      Lisa Soria, Pharmacist

## 2019-08-01 NOTE — QUICK NOTE
· Discussed with Tiffanie Velasquez MD, general surgery -- okay to resume heparin drip and Coumadin  · Updated bedside RN regarding this plan

## 2019-08-01 NOTE — PROGRESS NOTES
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  POD#1 Above the knee amputation  Complicated by uncontrolled diabetes, diabetic neuropathy, morbid obesity, limited mobility  Pain controlled although patient is currently unable to keep his eyes open, will adjust pain medications to oral control with morphine for breakthrough only  Dressing left intact, will change tomorrow  Continue IV abx per ID  DM diet as tolerated  PT/OT    Chronic lower leg lymphedema with statis dermatitis of bilateral lower extremities  Diuretics, elevation, monitor    Diabetes type 2 with long term insulin, uncontrolled  Last A1C 9 4  Sugars have been in the high 100's - low 200's in the last 24 hours  Continue education on tight control for wound healing    Chronic atrial fibrillation on coumadin  Currently on heparin subcutaneous q8h   Okay to restart coumadin today     Acute on chronic kidney failure  Cr  Trending down, 1 4 today  Monitor  IVF    Multiple medical comorbidities  Morbid obesity  CHF  COPD  HTN      Subjective/Objective   Chief Complaint: No overnight issues    Subjective: Wife present at bedside reports he was up some of the night, patient reports the pain was minimal with medications  He becomes very drowsy with Dilaudid IV  No other issues  Objective:     Blood pressure 166/79, pulse 78, temperature 98 6 °F (37 °C), temperature source Oral, resp  rate 18, height 6' 3" (1 905 m), weight (!) 178 kg (393 lb 4 8 oz), SpO2 (!) 89 %  ,Body mass index is 49 16 kg/m²        Intake/Output Summary (Last 24 hours) at 8/1/2019 0934  Last data filed at 7/31/2019 1053  Gross per 24 hour   Intake 600 ml   Output --   Net 600 ml       Invasive Devices     Peripherally Inserted Central Catheter Line            PICC Line 31/04/56 Right Basilic 2 days          Peripheral Intravenous Line            Peripheral IV 07/29/19 Right;Ventral (anterior) Forearm 2 days                Physical Exam: /79 (BP Location: Left arm)   Pulse 78   Temp 98 6 °F (37 °C) (Oral)   Resp 18   Ht 6' 3" (1 905 m)   Wt (!) 178 kg (393 lb 4 8 oz)   SpO2 (!) 89%   BMI 49 16 kg/m²   General appearance: Oriented x 3, awake but drowsy, difficulty keeping eyes open  Lungs: clear to auscultation bilaterally and without wheezes, crackles, or rhonchi   Heart: irregularly irregular rhythm and S1, S2 normal  Abdomen: soft, non-tender, obese  Left AKA dressing left intact, clean and dry    Lab, Imaging and other studies:  CBC:   Lab Results   Component Value Date    WBC 9 82 08/01/2019    HGB 9 2 (L) 08/01/2019    HCT 31 5 (L) 08/01/2019    MCV 93 08/01/2019     08/01/2019    MCH 27 2 08/01/2019    MCHC 29 2 (L) 08/01/2019    RDW 15 9 (H) 08/01/2019    MPV 10 2 08/01/2019    NRBC 0 08/01/2019   , CMP:   Lab Results   Component Value Date    SODIUM 138 08/01/2019    K 5 1 08/01/2019     08/01/2019    CO2 26 08/01/2019    BUN 42 (H) 08/01/2019    CREATININE 1 39 (H) 08/01/2019    CALCIUM 8 8 08/01/2019    EGFR 52 08/01/2019   , Coagulation: No results found for: PT, INR, APTT  VTE Pharmacologic Prophylaxis: Heparin  VTE Mechanical Prophylaxis: sequential compression device

## 2019-08-01 NOTE — PHYSICAL THERAPY NOTE
PT eval/tx   08/01/19 1550   Note Type   Note type Eval/Treat   Pain Assessment   Pain Assessment 0-10   Pain Score 7   Pain Type Surgical pain   Pain Location Leg   Pain Orientation Left   Home Living   Type of Home House   Additional Comments states house is being sold   Prior Function   Level of [place] Independent with ADLs and functional mobility   Lives With Spouse   Receives Help From Family   ADL Assistance Independent   IADLs Needs assistance   Vocational Retired   Restrictions/Precautions   Other Precautions   (L AKA 7/31/2019)   General   Additional Pertinent History IDDM, cellulitis,obesity   Cognition   Overall Cognitive Status WFL   Arousal/Participation Alert   Orientation Level Oriented to person;Oriented to place   Memory Within functional limits   Following Commands Follows one step commands with increased time or repetition   RUE Assessment   RUE Assessment WFL   LUE Assessment   LUE Assessment WFL   RLE Assessment   RLE Assessment WFL   LLE Assessment   LLE Assessment   (AKA, hip flexion passively to 90 sitting)   Coordination   Movements are Fluid and Coordinated 0   Coordination and Movement Description struggles with bed mobility   Bed Mobility   Rolling L 2  Maximal assistance   Additional items Assist x 2  (partial roll/turn)   Supine to Sit 3  Moderate assistance   Additional items Assist x 2; Increased time required;Verbal cues   Sit to Supine 4  Minimal assistance   Additional items Assist x 2; Increased time required   Transfers   Sit to Stand Unable to assess   Balance   Static Sitting Fair   Dynamic Sitting Fair -   Endurance Deficit   Endurance Deficit Yes   Endurance Deficit Description tires quickly   Activity Tolerance   Activity Tolerance Patient limited by fatigue   Nurse Made Aware yes medicated w/ iv dilaudid   Assessment   Prognosis Good   Problem List Decreased strength;Decreased endurance;Decreased range of motion; Impaired balance;Decreased mobility; Decreased coordination;Orthopedic restrictions;Decreased skin integrity; Decreased safety awareness   Assessment Pt able to sit with rle over edge of bed x 2min then repositioned to try edge o bed with only partial success  REturned to bed and in high [place] with need sin reach  Instr in desensitization L AKA  Will need in-pt rehab atd/c to regian safe mobility  Will follow see goals   Barriers to Discharge Inaccessible home environment;Decreased caregiver support   Goals   Patient Goals get better  pain relief   STG Expiration Date 08/10/19   Short Term Goal #1 1) safe idn bed mobility with rails ,2) improve Aom L hip to 20degr abduction, 90 degrees flexion and 5 degrees extension3) safe sit edge of bed for meals 4) transfer sit-stand with ra and bed raised Ax2   Plan   Treatment/Interventions ADL retraining;Functional transfer training;LE strengthening/ROM; Therapeutic exercise; Endurance training;Patient/family training;Bed mobility;OT;Spoke to nursing   PT Frequency 5x/wk   Recommendation   Recommendation Short-term skilled PT   Equipment Recommended Walker; Wheelchair   PT - OK to Discharge Yes   Additional Comments   (to STR with medical clearance)   Barthel Index   Feeding 10   Bathing 0   Grooming Score 5   Dressing Score 5   Bladder Score 10   Bowels Score 10   Toilet Use Score 0   Transfers (Bed/Chair) Score 5   Mobility (Level Surface) Score 0   Stairs Score 0   Barthel Index Score 45   Tran Quezada, PT

## 2019-08-01 NOTE — OCCUPATIONAL THERAPY NOTE
[address]  Evaluation     Patient Name: Andrew Mendoza  WOGYJ'H Date: 8/1/2019  Problem List  Patient Active Problem List   Diagnosis    Diabetic foot ulcer ([facility] )    Diabetes mellitus type 2, uncontrolled ([facility] )    Gout    Chronic venous stasis dermatitis of both lower extremities    Essential hypertension    Chronic atrial fibrillation (HCC)    Morbid obesity (HCC)    Chronic diastolic congestive heart failure (HCC)    Cardiomyopathy ([facility])    Diabetes, polyneuropathy ([facility] )    GERD (gastroesophageal reflux disease)    Hyperlipidemia    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, right ([facility])    Varicose veins of lower extremity with ulcer, left ([address] )    Onychomycosis    Gallstones    Blood alkaline phosphatase increased compared with prior measurement    Chronic heel ulcer, left, with fat layer exposed ([facility] )    Localized edema    Diabetic ulcer of toe of left foot associated with type 2 diabetes mellitus, limited to breakdown of skin ([address] )    Peripheral vascular disease ([facility] )    NALLELY (obstructive sleep apnea)    Moderate persistent asthma without complication    Type 2 diabetes mellitus with left diabetic foot ulcer ([facility])    Acute renal failure superimposed on stage 3 chronic kidney disease ([facility])    Skin ulcer of left foot with necrosis of muscle ([address] )    Left leg cellulitis     Past Medical History  Past Medical History:   Diagnosis Date    CHF (congestive heart failure) ([facility])     COPD (chronic obstructive pulmonary disease) ([facility])     Decubitus ulcer of heel     LAST ASSESSED [id number]    Diabetes mellitus ([address] )     History of varicose veins     Hypertension     Intermittent claudication ([facility])     Neuropathy     Seasonal allergies      Past Surgical History  Past Surgical History:   Procedure Laterality Date    AMPUTATION ABOVE KNEE (AKA) Left 7/31/2019    Procedure: AMPUTATION ABOVE KNEE (AKA);   Surgeon: Yokasta Monahan MD;  Location:  MAIN OR;  Service: General    ANGIOPLASTY      W/ FLUOROSC ANGIOGRAPGY PERIPHERAL ARTERY ADDITIONAL  LAST ASSESSED [id number]    ANGIOPLASTY / STENTING FEMORAL      TANSCATH INTRAVASCULAR STENT PLACEMENT PERCUTANEOUS FEMORAL     FULL THICKNESS SKIN GRAFT      TENDON REPAIR      TOE AMPUTATION Left 12/27/2018    Procedure: AMPUTATION left 4th TOE;  Surgeon: Arleen Almeida DPM;  Location: [facility] MAIN [facility];  Service: Podiatry           08/01/19 1600   Note Type   Note type Eval only   Restrictions/Precautions   Other Precautions Fall Risk;Pain;Multiple lines  (L AKA 7/31/19)   Pain Assessment   Pain Assessment 0-10   Pain Score 7   Pain Type Surgical pain   Pain Location Leg   Pain Orientation Left   Hospital Pain Intervention(s) Repositioned; Ambulation/increased activity; Emotional support; Rest   Response to Interventions Tolerated OT   Multiple Pain Sites No   Home Living   Type of Home House   Home Layout Multi-level;Bed/bath upstairs  (0 RAYMUNDO; FOS to 2nd floor)   [address]  (however denies AD use PTA)   Additional Comments Pt lives in a multi-level house with 0 RAYMUNDO and FOS to 2nd floor, however pt reports house is currently being sold  Prior Function   Level of [place] Independent with ADLs and functional mobility   Receives Help From Family   ADL Assistance Independent   IADLs Independent   Falls in the last 6 months 0   Vocational Retired   Comments At baseline, pt was I w/ ADLs, IADLs, and functional mobility/transfers w/o use of AD, (+) , and denies falls PTA  Lifestyle   Autonomy At baseline, pt was I w/ ADLs, IADLs, and functional mobility/transfers w/o use of AD, (+) , and denies falls PTA     Reciprocal Relationships Supportive family, sister present   Service to Others Retired   Intrinsic Gratification Watching TV   Psychosocial   Psychosocial (WDL) WDL   ADL   Where Assessed Edge of bed   Eating Assistance 5  Supervision/Setup   Grooming Assistance 5  Supervision/Setup   UB Bathing Assistance 5  Supervision/Setup   LB Bathing Assistance 3  Moderate Assistance   UB Dressing Assistance 4  Minimal [address] 2  Maximal Assistance   Toileting Assistance  2  Maximal Assistance   Functional Assistance 2  Maximal Assistance   Bed Mobility   Rolling L 2  Maximal assistance   Additional items Assist x 2;Bedrails; Increased time required;Verbal cues;LE management   Supine to Sit 3  Moderate assistance   Additional items Assist x 2;HOB elevated; Bedrails; Increased time required;Verbal cues;LE management   Sit to Supine 4  Minimal assistance   Additional items Assist x 2; Increased time required;Verbal cues   Additional Comments Pt lying supine at end of session with call bell and phone within reach  All needs met and pt reports no further questions for OT at this time  Transfers   Sit to Stand Unable to assess   Additional Comments Not appropriate at this time 2* increased pain and decreased seaetd balance/tolerance   Functional Mobility   Additional Comments Not appropriate at this time 2* increased pain and decreased seaetd balance/tolerance   Balance   Static Sitting Fair   Dynamic Sitting Fair -   Activity Tolerance   Activity Tolerance Patient limited by fatigue;Patient limited by pain   Medical Staff [address] Jorge L, PT   Nurse Made Aware yes;  Phil, RN   RUE Assessment   RUE Assessment WFL   RUE Strength   RUE Overall Strength Within Functional Limits - able to perform ADL tasks with strength  (4/5 throughout)   LUE Assessment   LUE Assessment WFL   LUE Strength   LUE Overall Strength Within Functional Limits - able to perform ADL tasks with strength  (4/5 throughout)   Hand Function   Gross Motor Coordination Functional   Fine Motor Coordination Functional   Sensation   Light Touch No apparent deficits   Proprioception   Proprioception No apparent deficits   Vision-Basic Assessment   Current Vision Wears glasses all the time   Vision - Complex Assessment   Ocular Range of Motion [facility]   Acuity Able to read clock/calendar on wall without difficulty   Perception   Inattention/Neglect Appears intact   Cognition   Overall Cognitive Status [facility]   Arousal/Participation Alert; Cooperative   Attention Within functional limits   Orientation Level Oriented X4   Memory Within functional limits   Following Commands Follows one step commands with increased time or repetition   Assessment   Limitation Decreased ADL status; Decreased UE strength;Decreased Safe judgement during ADL;Decreased endurance;Decreased self-care trans;Decreased high-level ADLs   Prognosis Good   Assessment Pt is a 79 y o  male seen for OT evaluation s/p adm to [address] on 7/25/2019 w/ B/L severe LE wounds and SOB  Pt now s/p L AKA on 7/31/19  Comorbidities affecting pts functional performance include a significant PMH of CHF, COPD, DM, HTN, and neuropathy  Pt with active OT orders and activity orders for Up with assistance  Pt lives in a multi-level house with 0 RAYMUN[address] and FOS to 2nd floor, however pt reports house is currently being sold  At baseline, pt was I w/ ADLs, IADLs, and functional mobility/transfers w/o use of AD, (+) , and denies falls PTA  Upon evaluation, pt currently requires Min A for UB ADLs, Max-Mod A for LB ADLs, Max A for toileting, and Max-Mod A of 2 for bed mobility (OOB not assessed at this time) 2* the following deficits impacting occupational performance: weakness, decreased strength, decreased balance, decreased tolerance, decreased safety awareness and increased pain  These impairments, as well at pts difficulty performing ADLS, difficulty performing IADLS  and limited insight into deficits limit pts ability to safely engage in all baseline areas of occupation  Pt scored overall 40/100 on the Barthel Index  Based on the aforementioned OT evaluation, functional performance deficits, and assessments, pt has been identified as a High complexity evaluation   Pt to continue to benefit from continued acute OT services during hospital stay to address defined deficits and to maximize level of functional independence in the following Occupational Performance areas: grooming, bathing/shower, toilet hygiene, dressing, health maintenance, functional mobility, community mobility, clothing management, cleaning, meal prep, household maintenance and social participation  From OT standpoint, recommend STR upon D/C  OT will continue to follow pt 2-3x/wk to address the following goals to  w/in 10-14 days:   Goals   Patient Goals To get better, pain relief   LTG Time Frame 10-   Long Term Goal Please refer to LTGs listed below   Plan   Treatment Interventions ADL retraining;Functional transfer training;UE strengthening/ROM; Endurance training;Patient/family training;Equipment evaluation/education; Compensatory technique education; Energy conservation; Activityengagement   Goal Expiration Date 08/15/19   Treatment Day 1   OT Frequency 2-3x/wk   Additional Treatment Session   Start Time    End Time 970   Treatment Assessment Pt seen for additional OT treatment session this PM focusing on functional activity tolerance, bed mobility, and seated tolerance/balance  Pt alert and cooperative throughout session  Pt seated in long sit position after completion of initial evaluation  After returning to supine position and rest break, pt agreeable to trial EOB sitting  Pt able to partially achieve SOB sitting with Mod A of 2 with cues for safe technique, extensive bed rail use, and assist for trunk control  Pt able to tolerate approx  5 mins of upright sitting EOB with Fair static seated balance and Fair- dynamic seated balance  Pt completed toileting tasks with Min A with use of urinal with setup required  Pt returned to supine position with Min A of 2 with assist for LE management and trunk control  Rolling to pt's L completed for placement of bed pads with Max A of 2 with increased pain demonstrated during task   Pt able to reposition towards [facility] with BUE support on bed rails and assist x2 therapists for optimal pt safety  Pt lying supine at end of session with call bell and phone within reach  All needs met and pt reports no further questions for OT at this time  Continue to recommend STR upon D/C  OT to follow pt on caseload      Additional Treatment Day 1   Recommendation   OT Discharge Recommendation Short Term Rehab   OT - OK to Discharge Yes  (when medically cleared to rehab)   Barthel Index   Feeding 10   Bathing 0   Grooming Score 5   Dressing Score 5   Bladder Score 5   Bowels Score 10   Toilet Use Score 0   Transfers (Bed/Chair) Score 5   Mobility (Level Surface) Score 0   Stairs Score 0   Barthel Index Score 40       GOALS    1) Pt will improve activity tolerance to G for min 30 min txment sessions for increase engagement in functional tasks    2) Pt will complete bed mobility at a Min A level w/ G balance/safety demonstrated to decrease caregiver assistance required    3) Pt will complete UB/LB dressing/self care w/ mod I using adaptive device and DME as needed    4) Pt will complete bathing w/ Mod I w/ use of AE and DME as needed    5) Pt will complete toileting w/ mod I w/ G hygiene/thoroughness using DME as needed    6) Pt will tolerate continued OOB assessment and appropriate transfer/mobility goals will be established by OTR as applicable     7) Pt will increase BUE strength by 1MM grade to increase independence in ADLs and transfers    8) Pt will be attentive 100% of the time during ongoing cognitive assessment w/ G participation to assist w/ safe d/c planning/recommendations    9) Pt will demonstrate G carryover of pt/caregiver education and training as appropriate w/o cues w/ good tolerance to increase safety during functional tasks    10) Pt will verbalize 3 potential fall hazards and identify appropriate compensatory techniques to decrease fall risk in home environment          Haseeb Benoit, OTR/L

## 2019-08-01 NOTE — PROGRESS NOTES
Progress Note - Infectious Disease   Andrew Mendoza 79 y o  male MRN: [id number]  Unit/Bed#: [address] [place] [address] Encounter: [id number]      Assessments:     Left lower extremity chronic diabetic foot with chronic venous stasis lymphedema with cellulitis  Most likely underlying chronic osteomyelitis  Status post left above-the-knee amputation July 31, 2019  -routine wound cultures grew out MRSA and Proteus mirabilis taken on admission  -MRI of the left lower extremity from July 29th concerning for acute infectious process near the left  Cuboid and metatarsal areas  -blood cultures on admission negative 7/25        Medical Hx of  Chronic atrial fibrillation  Hypertension  Gout  Morbid obesity  History of asthma  History of Charcot foot  Chronic lymphedema and venous stasis in the lower extremities bilaterally           Plan:     Patient is on Day 6 of IV vancomycin and cefepime   Today white blood cell count 8000 no fevers overnight the surgical area looks good  The patient has no signs of active infection  We will discontinue IV antibiotics tomorrow evening      Melisa Smith,   Infectious Disease  [facility]   Cell [phone]        Subjective: The patient denies fevers chills nausea vomiting diarrhea upset stomach the surgical area on the left site looks great which is currently wrapped in surgical dressing patient is not complaining of pain at the left stump area    REVIEW OF SYSTEMS  GENERAL/CONSTITUTIONAL: The patient denies fever, fatigue, weakness, weight gain or weight loss    HEAD, EYES, EARS, NOSE AND THROAT: Eyes - The patient denies pain, redness, loss of vision, double or blurred vision, flashing lights or spots, dryness, sore throats, and hoarseness   CARDIOVASCULAR: The patient denies chest pain, irregular heartbeats, sudden changes in heartbeat or palpitation, shortness of breath, difficulty breathing at night, swollen legs RESPIRATORY: The patient denies chronic dry cough, coughing up blood, coughing up mucus and wheezing  GASTROINTESTINAL: The patient denies decreased appetite, nausea, vomiting, vomiting blood or coffee ground material, heartburn  GENITOURINARY: The patient denies difficult urination, pain or burning with urination, blood in the urine, cloudy or smoky urine, frequent need to urinate  MUSCULOSKELETAL: The patient denies arm, buttock, thigh or calf cramps  No joint or muscle pain  No muscle weakness or tenderness  No joint swelling, neck pain, back pain or major orthopedic injuries  SKIN AND BREASTS: The patient denies easy bruising, skin redness, skin rash, hives, sensitivity to sun exposure, tightness, nodules or bumps, hair loss, color changes in the hands or feet with cold, breast lump, breast pain or nipple discharge  NEUROLOGIC: The patient denies headache, dizziness, fainting, muscle spasm, loss of consciousness, sensitivity or pain in the hands and feet or memory loss  PSYCHIATRIC: The patient denies depression with thoughts of suicide, voices in ?? head telling ? ? to do things and has not been seen for psychiatric counseling or treatment  ENDOCRINE: The patient denies intolerance to hot or cold temperature, flushing, fingernail changes, increased thirst, increased salt intake or decreased sexual desire  HEMATOLOGIC/LYMPHATIC: The patient denies anemia, bleeding tendency or clotting tendency  ALLERGIC/IMMUNOLOGIC: The patient denies rhinitis, asthma, skin sensitivity, latex allergies or sensitivity  Objective:     Temp:  [97 9 °F (36 6 °C)-98 6 °F (37 °C)] 98 6 °F (37 °C)  HR:  [67-78] 67  Resp:  [17-20] 17  BP: (137-166)/(55-80) 140/65  SpO2:  [88 %-92 %] 88 %  Temp (24hrs), Av 4 °F (36 9 °C), Min:97 9 °F (36 6 °C), Max:98 6 °F (37 °C)  Current: Temperature: 98 6 °F (37 °C)    Physical Exam:     GENERAL APPEARANCE: Well developed, well nourished, in no acute distress    Large body habitus  SKIN: Inspection of the skin reveals no rashes, ulcerations or petechiae  HEENT: The sclerae were anicteric and conjunctivae were pink and moist  Extraocular movements were intact and pupils were equal, round, and reactive to light with normal accommodation  NECK: Supple and symmetric  There was no thyroid enlargement, and no tenderness, or masses were felt  CHEST: Normal AP diameter and normal contour without any kyphoscoliosis  LUNGS: Auscultation of the lungs revealed normal breath sounds without any other adventitious sounds or rubs  CARDIOVASCULAR: There was a regular rate and rhythm without any murmurs, gallops, rubs  The carotid pulses were normal and 2+ bilaterally without bruits  ABDOMEN: Soft and nontender with normal bowel sounds  No ascites was noted  LYMPH NODES: No lymphadenopathy was appreciated in the neck, axillae or groin  MUSCULOSKELETAL: Gait was normal  There was no tenderness or effusions noted  Muscle strength and tone were normal   EXTREMITIES:  Patient has a left above-the-knee amputation and the surgical area is wrapped in surgical gauze and dressing  NEUROLOGIC: Alert and oriented x 3  Normal affect  Invasive Devices     Peripherally Inserted Central Catheter Line            PICC Line 22/99/42 Right Basilic 3 days          Peripheral Intravenous Line            Peripheral IV 07/29/19 Right;Ventral (anterior) Forearm 3 days                CBC w/diff  Recent Labs     08/01/19  0457   WBC 9 82   HGB 9 2*   HCT 31 5*      NEUTOPHILPCT 73   LYMPHOPCT 12*   MONOPCT 8   EOSPCT 3     BMP  Recent Labs     08/01/19  0457   K 5 1      CO2 26   BUN 42*   CREATININE 1 39*   CALCIUM 8 8     CMP  Recent Labs     08/01/19  0457   K 5 1      CO2 26   BUN 42*   CREATININE 1 39*   CALCIUM 8 8        labrc    Cultures:  Lab Results   Component Value Date    BLOODCX No Growth After 5 Days  07/25/2019    BLOODCX No Growth After 5 Days  07/25/2019    BLOODCX No Growth After 5 Days   12/25/2018    BLOODCX No Growth After 5 Days  12/25/2018    BLOODCX No Growth After 5 Days  03/23/2018    BLOODCX No Growth After 5 Days  03/23/2018    BLOODCX No Growth After 5 Days  10/08/2017    BLOODCX No Growth After 5 Days  10/08/2017    BLOODCX No Growth After 5 Days  09/15/2016    BLOODCX No Growth After 5 Days   09/15/2016     Lab Results   Component Value Date    WOUNDCULT 4+ Growth of Proteus mirabilis (A) 07/25/2019    WOUNDCULT 4+ Growth of  07/25/2019    WOUNDCULT (A) 07/25/2019     4+ Growth of Methicillin Resistant Staphylococcus aureus    WOUNDCULT 1+ Growth of Proteus mirabilis (A) 07/25/2019    WOUNDCULT 4+ Growth of  07/25/2019    WOUNDCULT 3+ Growth of Staphylococcus aureus (A) 12/25/2018    WOUNDCULT 2+ Growth of  12/25/2018    WOUNDCULT 4+ Growth of Staphylococcus aureus 09/15/2016    WOUNDCULT 4+ Growth of Proteus mirabilis 09/15/2016    WOUNDCULT 4+ Growth of Mixed Skin Nini 09/15/2016     No results found for: URINECX  No results found for: SPUTUMCULTUR    MED:      Current Facility-Administered Medications:     acetaminophen (TYLENOL) tablet 650 mg, 650 mg, Oral, Q6H PRN, Josh Citrin Astl-Raymond, PA-C    albuterol (PROVENTIL HFA,VENTOLIN HFA) inhaler 2 puff, 2 puff, Inhalation, Q6H PRN, Josh Citrin Astl-Raymond, PA-C, 2 puff at 07/31/19 0749    allopurinol (ZYLOPRIM) tablet 300 mg, 300 mg, Oral, Daily, Suzette Astl-Raymond, PA-C, 300 mg at 08/01/19 0825    atorvastatin (LIPITOR) tablet 20 mg, 20 mg, Oral, Daily, Suzette Astl-Raymond, PA-C, 20 mg at 08/01/19 0825    cefepime (MAXIPIME) 2 g/50 mL dextrose IVPB, 2,000 mg, Intravenous, Q12H, Suzette Astl-Raymond, PA-C, Last Rate: 100 mL/hr at 08/01/19 0326, 2,000 mg at 08/01/19 0326    fluticasone-vilanterol (BREO ELLIPTA) 100-25 mcg/inh inhaler 1 puff, 1 puff, Inhalation, Daily, Suzette Sánchez PA-C, 1 puff at 08/01/19 0826    furosemide (LASIX) injection 40 mg, 40 mg, Intravenous, Daily, Jessica Oshea MD, 40 mg at 08/01/19 0825    heparin (porcine) subcutaneous injection 5,000 Units, 5,000 Units, Subcutaneous, Q8H [address], Suzette Sánchez PA-C, 5,000 Units at 08/01/19 1329    HYDROcodone-acetaminophen (NORCO) 5-325 mg per tablet 2 tablet, 2 tablet, Oral, Q4H PRN, Dalila Mora PA-C    HYDROmorphone (DILAUDID) injection 0 5 mg, 0 5 mg, Intravenous, Q2H PRN, Dalila Mora PA-C, 0 5 mg at 07/31/19 1714    insulin glargine (LANTUS) subcutaneous injection 14 Units 0 14 mL, 14 Units, Subcutaneous, HS, Suzette Sánchez PA-C, 14 Units at 07/31/19 2150    insulin lispro (HumaLOG) 100 units/mL subcutaneous injection 2-12 Units, 2-12 Units, Subcutaneous, TID AC, 2 Units at 08/01/19 0827 **AND** Fingerstick Glucose (POCT), , , TID AC, Suzette Sánchez PA-C    insulin lispro (HumaLOG) 100 units/mL subcutaneous injection 30 Units, 30 Units, Subcutaneous, TID With Meals, Sera Sánchez PA-C, 30 Units at 08/01/19 1329    losartan (COZAAR) tablet 50 mg, 50 mg, Oral, Daily, Jessica Oneill MD, 50 mg at 08/01/19 0825    metoprolol tartrate (LOPRESSOR) tablet 25 mg, 25 mg, Oral, Q12H [address], Suzette Sánchez PA-C, 25 mg at 08/01/19 0825    vancomycin (VANCOCIN) IVPB (premix) 750 mg, 750 mg, Intravenous, Once PRN, Stanley Woods MD    Principal Problem:    Type 2 diabetes mellitus with left diabetic foot ulcer ([facility] )  Active Problems:    Gout    Chronic venous stasis dermatitis of both lower extremities    Essential hypertension    Chronic atrial fibrillation (HCC)    Morbid obesity (HCC)    Chronic diastolic congestive heart failure (HCC)    Moderate persistent asthma without complication    Acute renal failure superimposed on stage 3 chronic kidney disease ([facility] )    Skin ulcer of left foot with necrosis of muscle ([address] )    Left leg cellulitis      Anam Vanegas, DO

## 2019-08-01 NOTE — PROGRESS NOTES
Vancomycin Assessment    Barb Melendez is a 79 y o  male who is currently receiving vancomycin 2000mg q 12hrs for skin-soft tissue infection     Relevant clinical data and objective history reviewed:  Creatinine   Date Value Ref Range Status   07/31/2019 1 45 (H) 0 60 - 1 30 mg/dL Final     Comment:     Standardized to IDMS reference method   07/30/2019 1 85 (H) 0 60 - 1 30 mg/dL Final     Comment:     Standardized to IDMS reference method   07/28/2019 1 52 (H) 0 60 - 1 30 mg/dL Final     Comment:     Standardized to IDMS reference method   01/15/2018 0 99 0 70 - 1 25 mg/dL Final     Comment:     Result Comment: For patients >52years of age, the reference limit  for Creatinine is approximately 13% higher for people  identified as -American      05/25/2017 1 31 (H) 0 70 - 1 25 mg/dL Final     Comment:     Result Comment: For patients >52years of age, the reference limit  for Creatinine is approximately 13% higher for people  identified as -American      12/09/2016 1 18 0 70 - 1 25 mg/dL Final     Comment:     Result Comment: For patients >52years of age, the reference limit  for Creatinine is approximately 13% higher for people  identified as -American  Vancomycin Rm   Date Value Ref Range Status   07/29/2019 18 1 ug/mL Final     /55   Pulse 76   Temp 97 9 °F (36 6 °C) (Oral)   Resp 20   Ht 6' 3" (1 905 m)   Wt (!) 167 kg (368 lb 14 4 oz)   SpO2 92%   BMI 46 11 kg/m²   I/O last 3 completed shifts:   In: 672 [I V :672]  Out: 901 [Urine:901]  Lab Results   Component Value Date/Time    BUN 54 (H) 07/31/2019 06:17 AM    BUN 27 (H) 01/23/2018 02:29 PM    WBC 6 98 07/31/2019 06:17 AM    WBC 5 16 12/23/2015 06:00 AM    HGB 9 5 (L) 07/31/2019 06:17 AM    HGB 10 6 (L) 12/23/2015 06:00 AM    HCT 32 2 (L) 07/31/2019 06:17 AM    HCT 34 5 (L) 12/23/2015 06:00 AM    MCV 93 07/31/2019 06:17 AM    MCV 91 12/23/2015 06:00 AM     07/31/2019 06:17 AM     12/23/2015 06:00 AM     Temp Readings from Last 3 Encounters:   07/31/19 97 9 °F (36 6 °C) (Oral)   05/07/19 97 9 °F (36 6 °C)   04/26/19 97 5 °F (36 4 °C) (Temporal)     Vancomycin Days of Therapy: 8    Assessment/Plan  The patient is currently on vancomycin utilizing scheduled dosing  The patient is receiving 2000mg q 12hrs with the most recent vancomycin level being at steady-state and supratherapeutic (42 3) based on a goal of 15-20 (appropriate for most indications) ; therefore, after clinical evaluation will be changed to 750mg q 12hrs when random vanco trough<20   Pharmacy will continue to follow closely for s/sx of nephrotoxicity, infusion reactions and appropriateness of therapy  BMP and CBC will be ordered per protocol  Plan for trough is a random level scheduled for 8/1/19 at 1230 and will restart dosing when lever<20  Significant to note the patient is post-op day 1 for AKA  Pharmacy will continue to follow the patients culture results and clinical progress daily      Bolivar Jacobsen, Pharmacist

## 2019-08-02 PROBLEM — I50.33 ACUTE ON CHRONIC DIASTOLIC CONGESTIVE HEART FAILURE (HCC): Status: ACTIVE | Noted: 2017-10-11

## 2019-08-02 LAB
ANION GAP SERPL CALCULATED.3IONS-SCNC: 7 MMOL/L (ref 4–13)
APTT PPP: 47 SECONDS (ref 23–37)
APTT PPP: 49 SECONDS (ref 23–37)
APTT PPP: 49 SECONDS (ref 23–37)
BASOPHILS # BLD AUTO: 0.08 THOUSANDS/ΜL (ref 0–0.1)
BASOPHILS NFR BLD AUTO: 1 % (ref 0–1)
BUN SERPL-MCNC: 45 MG/DL (ref 5–25)
CALCIUM SERPL-MCNC: 8.3 MG/DL (ref 8.3–10.1)
CHLORIDE SERPL-SCNC: 104 MMOL/L (ref 100–108)
CO2 SERPL-SCNC: 28 MMOL/L (ref 21–32)
CREAT SERPL-MCNC: 1.6 MG/DL (ref 0.6–1.3)
EOSINOPHIL # BLD AUTO: 0.29 THOUSAND/ΜL (ref 0–0.61)
EOSINOPHIL NFR BLD AUTO: 3 % (ref 0–6)
ERYTHROCYTE [DISTWIDTH] IN BLOOD BY AUTOMATED COUNT: 16.4 % (ref 11.6–15.1)
GFR SERPL CREATININE-BSD FRML MDRD: 44 ML/MIN/1.73SQ M
GLUCOSE SERPL-MCNC: 149 MG/DL (ref 65–140)
GLUCOSE SERPL-MCNC: 151 MG/DL (ref 65–140)
GLUCOSE SERPL-MCNC: 156 MG/DL (ref 65–140)
GLUCOSE SERPL-MCNC: 165 MG/DL (ref 65–140)
GLUCOSE SERPL-MCNC: 168 MG/DL (ref 65–140)
GLUCOSE SERPL-MCNC: 196 MG/DL (ref 65–140)
HCT VFR BLD AUTO: 28.9 % (ref 36.5–49.3)
HGB BLD-MCNC: 8.6 G/DL (ref 12–17)
IMM GRANULOCYTES # BLD AUTO: 0.44 THOUSAND/UL (ref 0–0.2)
IMM GRANULOCYTES NFR BLD AUTO: 5 % (ref 0–2)
INR PPP: 1.23 (ref 0.84–1.19)
LYMPHOCYTES # BLD AUTO: 1.51 THOUSANDS/ΜL (ref 0.6–4.47)
LYMPHOCYTES NFR BLD AUTO: 16 % (ref 14–44)
MCH RBC QN AUTO: 27.6 PG (ref 26.8–34.3)
MCHC RBC AUTO-ENTMCNC: 29.8 G/DL (ref 31.4–37.4)
MCV RBC AUTO: 93 FL (ref 82–98)
MONOCYTES # BLD AUTO: 0.81 THOUSAND/ΜL (ref 0.17–1.22)
MONOCYTES NFR BLD AUTO: 9 % (ref 4–12)
NEUTROPHILS # BLD AUTO: 6.29 THOUSANDS/ΜL (ref 1.85–7.62)
NEUTS SEG NFR BLD AUTO: 66 % (ref 43–75)
NRBC BLD AUTO-RTO: 1 /100 WBCS
PLATELET # BLD AUTO: 265 THOUSANDS/UL (ref 149–390)
PMV BLD AUTO: 9.9 FL (ref 8.9–12.7)
POTASSIUM SERPL-SCNC: 4.5 MMOL/L (ref 3.5–5.3)
PROTHROMBIN TIME: 15.2 SECONDS (ref 11.6–14.5)
RBC # BLD AUTO: 3.12 MILLION/UL (ref 3.88–5.62)
SODIUM SERPL-SCNC: 139 MMOL/L (ref 136–145)
VANCOMYCIN SERPL-MCNC: 19.4 UG/ML
VANCOMYCIN TROUGH SERPL-MCNC: 21.9 UG/ML (ref 10–20)
WBC # BLD AUTO: 9.42 THOUSAND/UL (ref 4.31–10.16)

## 2019-08-02 PROCEDURE — 85730 THROMBOPLASTIN TIME PARTIAL: CPT | Performed by: HOSPITALIST

## 2019-08-02 PROCEDURE — 85730 THROMBOPLASTIN TIME PARTIAL: CPT | Performed by: INTERNAL MEDICINE

## 2019-08-02 PROCEDURE — 99232 SBSQ HOSP IP/OBS MODERATE 35: CPT | Performed by: INTERNAL MEDICINE

## 2019-08-02 PROCEDURE — 97535 SELF CARE MNGMENT TRAINING: CPT

## 2019-08-02 PROCEDURE — 82948 REAGENT STRIP/BLOOD GLUCOSE: CPT

## 2019-08-02 PROCEDURE — 94640 AIRWAY INHALATION TREATMENT: CPT

## 2019-08-02 PROCEDURE — 85025 COMPLETE CBC W/AUTO DIFF WBC: CPT | Performed by: INTERNAL MEDICINE

## 2019-08-02 PROCEDURE — 80048 BASIC METABOLIC PNL TOTAL CA: CPT | Performed by: INTERNAL MEDICINE

## 2019-08-02 PROCEDURE — 97530 THERAPEUTIC ACTIVITIES: CPT

## 2019-08-02 PROCEDURE — 99024 POSTOP FOLLOW-UP VISIT: CPT | Performed by: PHYSICIAN ASSISTANT

## 2019-08-02 PROCEDURE — 94760 N-INVAS EAR/PLS OXIMETRY 1: CPT

## 2019-08-02 PROCEDURE — 80202 ASSAY OF VANCOMYCIN: CPT | Performed by: HOSPITALIST

## 2019-08-02 PROCEDURE — 85610 PROTHROMBIN TIME: CPT | Performed by: INTERNAL MEDICINE

## 2019-08-02 RX ADMIN — INSULIN GLARGINE 14 UNITS: 100 INJECTION, SOLUTION SUBCUTANEOUS at 21:12

## 2019-08-02 RX ADMIN — METOPROLOL TARTRATE 25 MG: 25 TABLET, FILM COATED ORAL at 08:12

## 2019-08-02 RX ADMIN — INSULIN LISPRO 30 UNITS: 100 INJECTION, SOLUTION INTRAVENOUS; SUBCUTANEOUS at 17:32

## 2019-08-02 RX ADMIN — INSULIN LISPRO 2 UNITS: 100 INJECTION, SOLUTION INTRAVENOUS; SUBCUTANEOUS at 11:58

## 2019-08-02 RX ADMIN — METOPROLOL TARTRATE 25 MG: 25 TABLET, FILM COATED ORAL at 21:12

## 2019-08-02 RX ADMIN — ATORVASTATIN CALCIUM 20 MG: 20 TABLET, FILM COATED ORAL at 08:12

## 2019-08-02 RX ADMIN — ALLOPURINOL 300 MG: 300 TABLET ORAL at 08:12

## 2019-08-02 RX ADMIN — INSULIN LISPRO 30 UNITS: 100 INJECTION, SOLUTION INTRAVENOUS; SUBCUTANEOUS at 08:09

## 2019-08-02 RX ADMIN — INSULIN LISPRO 2 UNITS: 100 INJECTION, SOLUTION INTRAVENOUS; SUBCUTANEOUS at 08:09

## 2019-08-02 RX ADMIN — HYDROMORPHONE HYDROCHLORIDE 0.5 MG: 1 INJECTION, SOLUTION INTRAMUSCULAR; INTRAVENOUS; SUBCUTANEOUS at 21:14

## 2019-08-02 RX ADMIN — HYDROCODONE BITARTRATE AND ACETAMINOPHEN 2 TABLET: 5; 325 TABLET ORAL at 19:41

## 2019-08-02 RX ADMIN — HYDROCODONE BITARTRATE AND ACETAMINOPHEN 2 TABLET: 5; 325 TABLET ORAL at 00:22

## 2019-08-02 RX ADMIN — HYDROCODONE BITARTRATE AND ACETAMINOPHEN 2 TABLET: 5; 325 TABLET ORAL at 11:56

## 2019-08-02 RX ADMIN — WARFARIN SODIUM 5 MG: 5 TABLET ORAL at 17:22

## 2019-08-02 RX ADMIN — FUROSEMIDE 80 MG: 10 INJECTION, SOLUTION INTRAVENOUS at 17:22

## 2019-08-02 RX ADMIN — CEFEPIME HYDROCHLORIDE 2000 MG: 2 INJECTION, SOLUTION INTRAVENOUS at 15:34

## 2019-08-02 RX ADMIN — INSULIN LISPRO 30 UNITS: 100 INJECTION, SOLUTION INTRAVENOUS; SUBCUTANEOUS at 11:58

## 2019-08-02 RX ADMIN — CEFEPIME HYDROCHLORIDE 2000 MG: 2 INJECTION, SOLUTION INTRAVENOUS at 02:45

## 2019-08-02 RX ADMIN — HEPARIN SODIUM AND DEXTROSE 15.1 UNITS/KG/HR: 10000; 5 INJECTION INTRAVENOUS at 13:20

## 2019-08-02 RX ADMIN — FUROSEMIDE 80 MG: 10 INJECTION, SOLUTION INTRAVENOUS at 08:18

## 2019-08-02 RX ADMIN — FLUTICASONE FUROATE AND VILANTEROL TRIFENATATE 1 PUFF: 100; 25 POWDER RESPIRATORY (INHALATION) at 07:52

## 2019-08-02 RX ADMIN — LOSARTAN POTASSIUM 50 MG: 50 TABLET, FILM COATED ORAL at 08:12

## 2019-08-02 RX ADMIN — HYDROMORPHONE HYDROCHLORIDE 0.5 MG: 1 INJECTION, SOLUTION INTRAMUSCULAR; INTRAVENOUS; SUBCUTANEOUS at 00:22

## 2019-08-02 NOTE — PROGRESS NOTES
Vancomycin Assessment    Yasir Santamaria is a 79 y o  male who is being treated with vancomycin for skin-soft tissue infection     Relevant clinical data and objective history reviewed:  Creatinine   Date Value Ref Range Status   08/02/2019 1 60 (H) 0 60 - 1 30 mg/dL Final     Comment:     Standardized to IDMS reference method   08/01/2019 1 39 (H) 0 60 - 1 30 mg/dL Final     Comment:     Standardized to IDMS reference method   07/31/2019 1 45 (H) 0 60 - 1 30 mg/dL Final     Comment:     Standardized to IDMS reference method   01/15/2018 0 99 0 70 - 1 25 mg/dL Final     Comment:     Result Comment: For patients >52years of age, the reference limit  for Creatinine is approximately 13% higher for people  identified as -American      05/25/2017 1 31 (H) 0 70 - 1 25 mg/dL Final     Comment:     Result Comment: For patients >52years of age, the reference limit  for Creatinine is approximately 13% higher for people  identified as -American      12/09/2016 1 18 0 70 - 1 25 mg/dL Final     Comment:     Result Comment: For patients >52years of age, the reference limit  for Creatinine is approximately 13% higher for people  identified as -American         Vancomycin Rm   Date Value Ref Range Status   08/02/2019 19 4 ug/mL Final     /69 (BP Location: Left arm)   Pulse 61   Temp 98 9 °F (37 2 °C) (Oral)   Resp 18   Ht 6' 3" (1 905 m)   Wt (!) 174 kg (382 lb 15 oz)   SpO2 94%   BMI 47 86 kg/m²   I/O last 3 completed shifts:  In: -   Out: 850 [Urine:850]  Lab Results   Component Value Date/Time    BUN 45 (H) 08/02/2019 06:23 AM    BUN 27 (H) 01/23/2018 02:29 PM    WBC 9 42 08/02/2019 06:23 AM    WBC 5 16 12/23/2015 06:00 AM    HGB 8 6 (L) 08/02/2019 06:23 AM    HGB 10 6 (L) 12/23/2015 06:00 AM    HCT 28 9 (L) 08/02/2019 06:23 AM    HCT 34 5 (L) 12/23/2015 06:00 AM    MCV 93 08/02/2019 06:23 AM    MCV 91 12/23/2015 06:00 AM     08/02/2019 06:23 AM     12/23/2015 06:00 AM Temp Readings from Last 3 Encounters:   08/02/19 98 9 °F (37 2 °C) (Oral)   05/07/19 97 9 °F (36 6 °C)   04/26/19 97 5 °F (36 4 °C) (Temporal)     Vancomycin Days of Therapy: 9    Assessment/Plan  The patient is currently on vancomycin utilizing scheduled dosing  Baseline risks associated with therapy include: obesity , leg amputation   The random vancomycin level at 14:47 on 8/2/19 was 19 4  and therapeutic based on a goal of 15-20 (appropriate for most indications) ; therefore, after clinical evaluation will be changed to 750 mg every 12 hours   Pharmacy will continue to follow closely for s/sx of nephrotoxicity, infusion reactions and appropriateness of therapy  BMP and CBC will be ordered per protocol  Plan for trough as patient approaches steady state, prior to the 4th  dose at approximately 07:30 on 8/4/19  Pharmacy will continue to follow the patients culture results and clinical progress daily      Nika Bartlett, Pharmacist

## 2019-08-02 NOTE — PROGRESS NOTES
Cardiology Progress Note - Sebastian Gutierrez 79 y o  male MRN: [id number]    Unit/Bed#: [address] [place], [place] [id number] Encounter: [id number]        Subjective:    No significant events overnight  No chest pain or dyspnea at rest      ROS    Objective:   Vitals: Blood pressure 147/68, pulse 65, temperature 98 2 °F (36 8 °C), temperature source Oral, resp  rate 18, height 6' 3" (1 905 m), weight (!) 174 kg (382 lb 15 oz), SpO2 94 %  , Body mass index is 47 86 kg/m² , Orthostatic Blood Pressures      Most Recent Value   Blood Pressure  147/68 filed at 08/02/2019 0725   Patient Position - Orthostatic VS  Lying filed at 08/02/2019 6165         Systolic (92UQY), DEW:988 , Min:128 , AAK:930     Diastolic (71YQT), HJO:08, Min:63, Max:77      Intake/Output Summary (Last 24 hours) at 8/2/2019 0926  Last data filed at 8/1/2019 2300  Gross per 24 hour   Intake --   Output 850 ml   Net -850 ml     Weight (last 2 days)     Date/Time   Weight    08/02/19 0510   (!) 174 (382 94)    08/01/19 0600   (!) 178 (393 3)    07/31/19 0600   (!) 167 (368 9)                    Physical Exam   Constitutional: He is oriented to person, place, and time  He appears well-developed  He appears distressed  Eyes: No scleral icterus  Neck: JVD present  Cardiovascular: Normal rate  An irregularly irregular rhythm present  Pulmonary/Chest: Effort normal and breath sounds normal  No stridor  No respiratory distress  He has no wheezes  Abdominal: Soft  Bowel sounds are normal  He exhibits no distension  There is no tenderness  There is no guarding  Musculoskeletal: He exhibits edema  Neurological: He is alert and oriented to person, place, and time  Skin: Skin is warm and dry           Laboratory Results:        CBC with diff: Results from last 7 days   Lab Units 08/02/19  0623 08/01/19  1616 08/01/19  0457 07/31/19  0617 07/30/19  0520 07/29/19  0648 07/28/19  1947 07/28/19  0341   WBC Thousand/uL 9 42 9 92 9 82 6 98 7 57 7 44  --  7 00   HEMOGLOBIN g/dL 8 6* 9 0* 9 2* 9 5* 9 7* 9 8* 10 8* 9 5*   HEMATOCRIT % 28 9* 30 8* 31 5* 32 2* 32 8* 31 8* 35 8* 30 8*   MCV fL 93 93 93 93 92 90  --  90   PLATELETS Thousands/uL 265 308 261 246 356 352  --  313   MCH pg 27 6 27 3 27 2 27 3 27 1 27 6  --  27 6   MCHC g/dL 29 8* 29 2* 29 2* 29 5* 29 6* 30 8*  --  30 8*   RDW % 16 4* 16 2* 15 9* 16 2* 15 9* 15 7*  --  15 9*   MPV fL 9 9 9 5 10 2 10 4 10 0 9 8  --  9 4   NRBC AUTO /100 WBCs 1  --  0 0 0 0  --  0   NRBC /100 WBC  --   --   --  4*  --   --   --   --          CMP:  Results from last 7 days   Lab Units 08/02/19  0623 08/01/19 0457 07/31/19 0617 07/30/19  0520 07/28/19 0418 07/27/19  0744   POTASSIUM mmol/L 4 5 5 1 4 5 4 4 4 5 5 0   CHLORIDE mmol/L 104 105 105 103 100 98*   CO2 mmol/L 28 26 30 27 24 27   BUN mg/dL 45* 42* 54* 66* 48* 38*   CREATININE mg/dL 1 60* 1 39* 1 45* 1 85* 1 52* 1 42*   CALCIUM mg/dL 8 3 8 8 8 5 8 3 8 5 8 8   EGFR ml/min/1 73sq m 44 52 50 37 47 51         BMP:  Results from last 7 days   Lab Units 08/02/19  0623 08/01/19 0457 07/31/19 0617 07/30/19 0520 07/28/19 0418 07/27/19  0744   POTASSIUM mmol/L 4 5 5 1 4 5 4 4 4 5 5 0   CHLORIDE mmol/L 104 105 105 103 100 98*   CO2 mmol/L 28 26 30 27 24 27   BUN mg/dL 45* 42* 54* 66* 48* 38*   CREATININE mg/dL 1 60* 1 39* 1 45* 1 85* 1 52* 1 42*   CALCIUM mg/dL 8 3 8 8 8 5 8 3 8 5 8 8       BNP: No results for input(s): BNP in the last 72 hours  Magnesium:       Coags:   Results from last 7 days   Lab Units 08/02/19  0623 08/02/19  0016 08/01/19  1617 07/31/19  7982 07/31/19  0617 07/30/19  5128 07/30/19  0142 07/29/19  1528 07/29/19  0648  07/27/19  1047   PTT seconds 49* 49* 32 32  --  60* 65* 40* 53*   < >  --    INR  1 23*  --  1 19  --  1 28*  --   --   --  1 47*  --  1 72*    < > = values in this interval not displayed         TSH: No results found for: TSH    Hemoglobin A1C       Lipid Profile:       Cardiac testing:   Results for orders placed during the hospital encounter of 04/04/19 Echo complete with contrast if indicated    Narrative [employer] [address]    Transthoracic Echocardiogram  2D, M-mode, Doppler, and Color Doppler    Study date:  2019    Patient: Wili Valle  MR number: [id number]  Account number: [de-identified]  : 1950  Age: 79 years  Gender: Male  Status: Outpatient  Location: [facility]  Height: 75 in  Weight: 368 lb  BP: 110/ 60 mmHg    Indications: Shortness of breath  Diagnoses: R06 02 - Shortness of breath    Sonographer:  Precious SOTO, RCS  Primary Physician: Nazario Ramirez MD  Referring Physician:  Mahendra Hancock MD  Group:  [id number]  Interpreting Physician:  Vito Washburn MD    SUMMARY    LEFT VENTRICLE:  Systolic function was normal  Ejection fraction was estimated to be 60 %  There were no regional wall motion abnormalities  There was mild concentric hypertrophy  Doppler parameters were consistent with a reversible restrictive pattern, indicative of decreased left ventricular diastolic compliance and/or increased left atrial pressure (grade 3 diastolic dysfunction)  RIGHT VENTRICLE:  The ventricle was mildly dilated  Systolic function was normal     LEFT ATRIUM:  The atrium was mildly dilated  RIGHT ATRIUM:  The atrium was mildly dilated  MITRAL VALVE:  There was mild annular calcification  There was trace regurgitation  HISTORY: PRIOR HISTORY: Atrial fibrillation, CHF, Hypertension, COPD, High cholesterol  PROCEDURE: The study was performed in the [facility]  This was a routine study  The transthoracic approach was used  The study included complete 2D imaging, M-mode, complete spectral Doppler, and color Doppler  Images were  obtained from the parasternal, apical, subcostal, and suprasternal notch acoustic windows   Intravenous contrast (Definity solution [1 3 ml Definity/8 7ml normal saline solution]) was administered  Intravenous contrast was administered to  opacify the left ventricle  Intravenous contrast (  8 ml Definity in NSS) was administered  Echocardiographic views were limited due to restricted patient mobility, decreased penetration, and lung interference  This was a technically  difficult study  LEFT VENTRICLE: Size was normal  Systolic function was normal  Ejection fraction was estimated to be 60 %  There were no regional wall motion abnormalities  Wall thickness was mildly increased  There was mild concentric hypertrophy  DOPPLER: Transmitral flow pattern: atrial fibrillation  Doppler parameters were consistent with a reversible restrictive pattern, indicative of decreased left ventricular diastolic compliance and/or increased left atrial pressure (grade 3  diastolic dysfunction)  Left ventricular diastolic function parameters were abnormal     RIGHT VENTRICLE: The ventricle was mildly dilated  Systolic function was normal  Wall thickness was normal     LEFT ATRIUM: The atrium was mildly dilated  RIGHT ATRIUM: The atrium was mildly dilated  MITRAL VALVE: There was mild annular calcification  Valve structure was normal  There was normal leaflet separation  DOPPLER: The transmitral velocity was within the normal range  There was no evidence for stenosis  There was trace  regurgitation  AORTIC VALVE: The valve was trileaflet  Leaflets exhibited normal thickness, normal cuspal separation, and sclerosis  DOPPLER: Transaortic velocity was within the normal range  There was no evidence for stenosis  There was no significant  regurgitation  TRICUSPID VALVE: Not well visualized  There was normal leaflet separation  DOPPLER: The transtricuspid velocity was within the normal range  There was no evidence for stenosis  There was no significant regurgitation  PULMONIC VALVE: Not well visualized  DOPPLER: The transpulmonic velocity was within the normal range   There was no significant regurgitation  PERICARDIUM: There was no pericardial effusion  The pericardium was normal in appearance  AORTA: The root exhibited normal size  SYSTEMIC VEINS: IVC: The inferior vena cava was normal in size  PULMONARY VEINS: DOPPLER: Doppler signals were not recordable in the pulmonary vein(s)  SYSTEM MEASUREMENT TABLES    2D  %FS: 38 66 %  Ao Diam: 4 17 cm  EDV(Teich): 164 53 ml  EF(Teich): 68 25 %  ESV(Teich): 52 23 ml  IVSd: 1 33 cm  LA Area: 30 96 cm2  LA Diam: 5 13 cm  LVEDV MOD A4C: 138 47 ml  LVEF MOD A4C: 69 46 %  LVESV MOD A4C: 42 3 ml  LVIDd: 5 77 cm  LVIDs: 3 54 cm  LVLd A4C: 8 57 cm  LVLs A4C: 6 97 cm  LVPWd: 1 35 cm  RA Area: 25 2 cm2  RVIDd: 4 47 cm  SV MOD A4C: 96 18 ml  SV(Teich): 112 3 ml    [facility]    Prepared and electronically signed by    Karthik Hurley MD  Signed 04-Apr-2019 14:05:02       No results found for this or any previous visit  No results found for this or any previous visit  No results found for this or any previous visit      Meds/Allergies   current meds:   Current Facility-Administered Medications   Medication Dose Route Frequency    acetaminophen (TYLENOL) tablet 650 mg  650 mg Oral Q6H PRN    albuterol (PROVENTIL HFA,VENTOLIN HFA) inhaler 2 puff  2 puff Inhalation Q6H PRN    allopurinol (ZYLOPRIM) tablet 300 mg  300 mg Oral Daily    atorvastatin (LIPITOR) tablet 20 mg  20 mg Oral Daily    cefepime (MAXIPIME) 2 g/50 mL dextrose IVPB  2,000 mg Intravenous Q12H    fluticasone-vilanterol (BREO ELLIPTA) 100-25 mcg/inh inhaler 1 puff  1 puff Inhalation Daily    furosemide (LASIX) injection 80 mg  80 mg Intravenous BID (diuretic)    heparin (porcine) 25,000 units in 250 mL infusion (premix)  3-20 Units/kg/hr (Order-Specific) Intravenous Titrated    HYDROcodone-acetaminophen (NORCO) 5-325 mg per tablet 2 tablet  2 tablet Oral Q4H PRN    HYDROmorphone (DILAUDID) injection 0 5 mg  0 5 mg Intravenous Q2H PRN  insulin glargine (LANTUS) subcutaneous injection 14 Units 0 14 mL  14 Units Subcutaneous HS    insulin lispro (HumaLOG) 100 units/mL subcutaneous injection 2-12 Units  2-12 Units Subcutaneous TID AC    insulin lispro (HumaLOG) 100 units/mL subcutaneous injection 30 Units  30 Units Subcutaneous TID With Meals    losartan (COZAAR) tablet 50 mg  50 mg Oral Daily    metoprolol tartrate (LOPRESSOR) tablet 25 mg  25 mg Oral Q12H JULIEN    vancomycin (VANCOCIN) IVPB (premix) 750 mg  750 mg Intravenous Once PRN    warfarin (COUMADIN) tablet 5 mg  5 mg Oral Daily (warfarin)     Medications Prior to Admission   Medication    albuterol (PROVENTIL HFA,VENTOLIN HFA) 90 mcg/act inhaler    allopurinol (ZYLOPRIM) 300 mg tablet    atorvastatin (LIPITOR) 20 mg tablet    BD INSULIN SYRINGE U/F 31G X 5/16" 0 5 ML MISC    BD PEN NEEDLE HILARIO U/F 32G X 4 MM MISC    fluticasone-salmeterol (ADVAIR DISKUS, WIXELA INHUB) 250-50 mcg/dose inhaler    glucose blood (ONE TOUCH ULTRA TEST) test strip    insulin aspart (NOVOLOG FLEXPEN) 100 Units/mL injection pen    Insulin Pen Needle 31G X 5 MM MISC    losartan (COZAAR) 50 mg tablet    metFORMIN (GLUCOPHAGE) 1000 MG tablet    metoprolol tartrate (LOPRESSOR) 25 mg tablet    torsemide (DEMADEX) 20 mg tablet    warfarin (COUMADIN) 5 mg tablet         heparin (porcine) 3-20 Units/kg/hr (Order-Specific) Last Rate: 13 1 Units/kg/hr (08/02/19 0101)     Assessment:  Principal Problem:    Type 2 diabetes mellitus with left diabetic foot ulcer (HCC)  Active Problems:    Gout    Chronic venous stasis dermatitis of both lower extremities    Essential hypertension    Chronic atrial fibrillation (HCC)    Morbid obesity (HCC)    Chronic diastolic congestive heart failure (HCC)    Moderate persistent asthma without complication    Acute renal failure superimposed on stage 3 chronic kidney disease (HCC)    Skin ulcer of left foot with necrosis of muscle (HCC)    Left leg cellulitis    Plan:    Left AKA / Chronic Diastolic CHF / Kimani Franz  AF    Continue IV diuretics as tolerated  Trend daily weights  Continue rate control and anticoagulation for AF  Counseling / Coordination of Care  Total floor / unit time spent today 25 minutes  Greater than 50% of total time was spent with the patient and / or family counseling and / or coordination of care  A description of the counseling / coordination of care

## 2019-08-02 NOTE — PLAN OF CARE
Problem: PHYSICAL THERAPY ADULT  Goal: Performs mobility at highest level of function for planned discharge setting  See evaluation for individualized goals  Description  Treatment/Interventions: ADL retraining, Functional transfer training, LE strengthening/ROM, Therapeutic exercise, Endurance training, Patient/family training, Bed mobility, OT, Spoke to nursing  Equipment Recommended: [place], Wheelchair       See flowsheet documentation for full assessment, interventions and recommendations  Outcome: Progressing  Note:   Prognosis: Good  Problem List: Decreased strength, Decreased endurance, Impaired balance, Decreased mobility, Decreased coordination, Orthopedic restrictions, Decreased skin integrity  Assessment: Able to transfer to edge of bed toady andsit unsupported  Attempted scoot to [address] laterally to R but not able to move significantly  Retruned to bed with mod/max Ax2 and repositioned  Able to flex L hip in supine to 80 degrees  RE-instr in desensitization  Good progress today, repositions self more eadily in bed  Con't to recommend in-pt rehab atd/c   Barriers to Discharge: Inaccessible home environment, Decreased caregiver support     Recommendation: Short-term skilled PT     PT - OK to Discharge: Yes    See flowsheet documentation for full assessment

## 2019-08-02 NOTE — PLAN OF CARE
Problem: OCCUPATIONAL THERAPY ADULT  Goal: Performs self-care activities at highest level of function for planned discharge setting  See evaluation for individualized goals  Description  Treatment Interventions: ADL retraining, Functional transfer training, UE strengthening/ROM, Endurance training, Patient/family training, Equipment evaluation/education, Compensatory technique education, Energy conservation, Activityengagement          See flowsheet documentation for full assessment, interventions and recommendations  Outcome: Progressing  Note:   Limitation: Decreased ADL status, Decreased UE strength, Decreased Safe judgement during ADL, Decreased endurance, Decreased self-care trans, Decreased high-level ADLs  Prognosis: Good  Assessment: Patient participated in Skilled OT session this date with interventions consisting of ADL re training with the use of correct body mechnaics, Energy Conservation techniques, deep breathing technique, safety awareness and fall prevention techniques, therapeutic exercise to: increase functional use of BUEs, increase BUE muscle strength ,  therapeutic activities to: increase activity tolerance, increase cardiovascular endurance , increase dynamic sit/ stand balance during functional activity , increase trunk control and increase OOB/ sitting tolerance   Patient agreeable to OT treatment session, upon arrival patient was found supine in bed  In comparison to previous session, patient with improvements in activity tolerance, bed mobility, grooming tasks and EOB sitting balance  Patient requiring verbal cues for pacing thru activity steps and frequent rest periods  Patient continues to be functioning below baseline level, occupational performance remains limited secondary to factors listed above and increased risk for falls and injury  From OT standpoint, recommendation at time of d/c would be Short Term Rehab     Patient to benefit from continued Occupational Therapy treatment while in the hospital to address deficits as defined above and maximize level of functional independence with ADLs and functional mobility  At end of session, patient supine in bed with family members present all needs in reach       OT Discharge Recommendation: Short Term Rehab  OT - OK to Discharge: Yes(to rehab when medically stable)

## 2019-08-02 NOTE — PROGRESS NOTES
The patient's vancomycin therapy has been completed / discontinued  Thank you for this consult  Pharmacy will sign-off now

## 2019-08-02 NOTE — OCCUPATIONAL THERAPY NOTE
[address] Treatment Note     Patient Name: Andrew Mendoza  DNIXO'W Date: 8/2/2019  Problem List  Patient Active Problem List   Diagnosis    Diabetic foot ulcer ([facility] )    Diabetes mellitus type 2, uncontrolled ([facility] )    Gout    Chronic venous stasis dermatitis of both lower extremities    Essential hypertension    Chronic atrial fibrillation (HCC)    Morbid obesity (HCC)    Acute on chronic diastolic congestive heart failure (HCC)    Cardiomyopathy ([facility])    Diabetes, [facility]uropathy [facility]    GERD (gastroesophageal reflux disease)    Hyperlipidemia    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, right (HCC)    Varicose veins of lower extremity with ulcer, left ([facility] )    Onychomycosis    Gallstones    Blood alkaline phosphatase increased compared with prior measurement    Chronic heel ulcer, left, with fat layer exposed ([facility] )    Localized edema    Diabetic ulcer of toe of left foot associated with type 2 diabetes mellitus, limited to breakdown of skin ([facility] )    Peripheral vascular disease ([place] [address] )    NALLELY (obstructive sleep apnea)    Moderate persistent asthma without complication    Type 2 diabetes mellitus with left diabetic foot ulcer (HCC)    Acute renal failure superimposed on stage 3 chronic kidney disease ([facility])    Skin ulcer of left foot with necrosis of muscle ([facility])    Left leg cellulitis      Time: 0260-4773 (25 min tx)       08/02/19 1324   Restrictions/Precautions   Weight Bearing Precautions Per Order No   Braces or Orthoses   (none)   Other Precautions Contact/isolation;Limb alert;Multiple lines;Telemetry;O2;Fall Risk;Pain  (L AKA 7/31, 2L O2 via [place])   General   Family/Caregiver Present wife + sister   Lifestyle   Autonomy At baseline, pt was I w/ ADLs, IADLs, and functional mobility/transfers w/o use of AD, (+) , and denies falls PTA     Reciprocal Relationships Supportive family, sister present   Service to Others Retired   Intrinsic Gratification Watching TV Pain Assessment   Pain Assessment Montalvo-Baker FACES   Montalvo-Baker FACES Pain Rating 4   Pain Type Acute pain;Surgical pain   Pain Location Leg   Pain Orientation Left   Pain Descriptors Sharp; Shooting   Pain Frequency Constant/continuous   Pain Onset Ongoing   Clinical Progression Gradually improving   Effect of Pain on Daily Activities limiting oob ADLs   Patient's Stated Pain Goal No pain   Hospital Pain Intervention(s) Medication (See MAR)   Response to Interventions medicated prior to OT arrival per RN report   ADL   Grooming Assistance 5  Supervision/Setup  (seated eob)   UB Dressing Assistance 3  Moderate Assistance  (to adjust hospital gown)   [address]    (denied need at this time)   Bed Mobility   Supine to Sit 2  Maximal assistance   Additional items Assist x 2;HOB elevated; Bedrails; Increased time required;Verbal cues;LE management   Sit to Supine 2  Maximal assistance   Additional items Assist x 2;Bedrails; Increased time required;Verbal cues;LE management   Transfers   Additional Comments deferred at this time 2* pain, fatigue + increased amount of assist required for bed mobility   Cognition   Overall Cognitive Status [facility]   Arousal/Participation Alert; Cooperative   Attention Within functional limits   Orientation Level Oriented X4   Memory Within functional limits   Following Commands Follows one step commands with increased time or repetition   Activity Tolerance   Activity Tolerance Patient limited by fatigue;Patient limited by pain   Medical Staff Made Aware PT DEWAYNE Reyes, MARKIE Martinezes   Assessment   Assessment Patient participated in Skilled OT session this date with interventions consisting of ADL re training with the use of correct body mechnaics, Energy Conservation techniques, deep breathing technique, safety awareness and fall prevention techniques, therapeutic exercise to: increase functional use of BUEs, increase BUE muscle strength ,  therapeutic activities to: increase activity tolerance, increase cardiovascular endurance , increase dynamic sit/ stand balance during functional activity , increase trunk control and increase OOB/ sitting tolerance   Patient agreeable to OT treatment session, upon arrival patient was found supine in bed  In comparison to previous session, patient with improvements in activity tolerance, bed mobility, grooming tasks and EOB sitting balance  Patient requiring verbal cues for pacing thru activity steps and frequent rest periods  Patient continues to be functioning below baseline level, occupational performance remains limited secondary to factors listed above and increased risk for falls and injury  From OT standpoint, recommendation at time of d/c would be Short Term Rehab  Patient to benefit from continued Occupational Therapy treatment while in the hospital to address deficits as defined above and maximize level of functional independence with ADLs and functional mobility  At end of session, patient supine in bed with family members present all needs in reach     Plan   Goal Expiration Date 08/15/19   Treatment Day 1   OT Frequency 2-3x/wk   Recommendation   OT Discharge Recommendation Short Term Rehab   OT - OK to Discharge Yes  (to rehab when medically stable)       Trey Avina MS, OTR/L

## 2019-08-02 NOTE — PHYSICAL THERAPY NOTE
PT tx     08/02/19 1325   Pain Assessment   Pain Assessment Montalvo-Baker FACES   Montalvo-Baker FACES Pain Rating 6   Pain Type Surgical pain   Pain Location Leg   Pain Orientation Left  (with movement)   General   Chart Reviewed Yes   Additional Pertinent History continues on heparin drip, dressing changes by surgery   Cognition   Overall Cognitive Status [facility]   Arousal/Participation Alert   Attention Attends with cues to redirect   Subjective   Subjective I just got comfortable   Bed Mobility   Supine to Sit 2  Maximal assistance   Additional items Assist x 2   Sit to Supine 2  Maximal assistance   Additional items Assist x 1  (plus mod assist of 2nd)   Balance   Static Sitting Fair +   Dynamic Sitting Fair -   Endurance Deficit   Endurance Deficit Yes   Endurance Deficit Description tires quickly   Activity Tolerance   Activity Tolerance Patient limited by fatigue;Patient limited by pain   Nurse Made Aware yes   Exercises   Balance training  sitting square on edge of bed x 10 min attmept scoot to R to HOB max assist of 2 limited success   Assessment   Prognosis Good   Problem List Decreased strength;Decreased endurance; Impaired balance;Decreased mobility; Decreased coordination;Orthopedic restrictions;Decreased skin integrity   Assessment Able to transfer to edge of bed toady andsit unsupported  Attempted scoot to [address] laterally to R but not able to move significantly  Retruned to bed with mod/max Ax2 and repositioned  Able to flex L hip in supine to 80 degrees  RE-instr in desensitization  Good progress today, repositions self more eadily in bed  Con't to recommend in-pt rehab atd/c  Goals   Patient Goals get better   Plan   Treatment/Interventions ADL retraining;Functional transfer training;LE strengthening/ROM; Therapeutic exercise; Endurance training;Patient/family training;Equipment eval/education; Bed mobility;Spoke to nursing;OT   Progress Progressing toward goals   PT Frequency 5x/wk   Recommendation Recommendation Short-term skilled PT   Equipment Recommended Wheelchair   PT - OK to Discharge Yes   Additional Comments   (to [facility]  AT [place] [facility])   Ivanna Melton, PT

## 2019-08-02 NOTE — PROGRESS NOTES
Progress Note - General Surgery   Barb Melendez 79 y o  male MRN: [id number]  Unit/Bed#: [address] [place] [address] [id number] Encounter: [id number]    Assessment/Plan:  Non-healing diabetic foot wound left with charcot deformity, cellulitis  POD#2Above the knee amputation  Complicated by uncontrolled diabetes, diabetic neuropathy, morbid obesity, limited mobility   -dressing removed, wound intact, staples in place, still with edema, small area of ecchymosis distal midline  -continue to monitor the wounds, dressing changes with compression until edema improved  -continue pain control as needed  -continue antibiotics per ID, infectious source now removed  -hemoglobin stable, continue to monitor  -continue PT/OT  -discharge planning for STR, okay for discharge this weekend from surgical standpoint, discharge per Primary Service  -follow-up in office in 2 weeks     Chronic lower leg lymphedema with statis dermatitis of bilateral lower extremities  -Diuretics, elevation, monitor     Diabetes type 2 with long term insulin, uncontrolled  Last A1C 9 4  Sugars have been in the high 100's - low 200's in the last 24 hours  Continue education on tight control for wound healing     Chronic atrial fibrillation on coumadin  Currently on heparin gtt  Coumadin restarted  Monitor INR     Acute on chronic kidney failure  Cr  Trending down, 1 4 today  Monitor  IVF     Multiple medical comorbidities  Morbid obesity  CHF  COPD  HTN         Subjective/Objective   Chief Complaint: pain    Subjective:   Patient still complains of pain  More alert today  Patient has not been out of bed  Claims he has not been seen by Physical therapy  Urinating well  Tolerating diet  No fevers or chills  Objective:     Blood pressure 148/69, pulse 61, temperature 98 9 °F (37 2 °C), temperature source Oral, resp  rate 18, height 6' 3" (1 905 m), weight (!) 174 kg (382 lb 15 oz), SpO2 94 %  ,Body mass index is 47 86 kg/m²        Intake/Output Summary (Last 24 hours) at 8/2/2019 1150  Last data filed at 8/2/2019 0900  Gross per 24 hour   Intake --   Output 1150 ml   Net -1150 ml       Invasive Devices     Peripherally Inserted Central Catheter Line            PICC Line 19/91/61 Right Basilic 3 days          Peripheral Intravenous Line            Peripheral IV 07/29/19 Right;Ventral (anterior) Forearm 4 days                Physical Exam:   General appearance: morbidly obese, alert and oriented, some discomfort, mostly with movement and dressing change  Head: Normocephalic, without obvious abnormality, atraumatic, sclerae anicteric, mucous membranes moist  Neck: no JVD and supple, symmetrical, trachea midline  Lungs: clear to auscultation, no wheezes or rales  Heart:   Irregularly, irregular  Abdomen: Morbidly obese, soft, nontender, active bowel sounds  Extremities:     Left AKA intact with sutures in place, remains edematous, small amount of drainage, small area of ecchymosis distal midline wound  N-V intact  Skin: Warm, dry  Nursing notes and vital signs reviewed    Lab, Imaging and other studies:  I have personally reviewed pertinent lab results    , CBC:   Lab Results   Component Value Date    WBC 9 42 08/02/2019    HGB 8 6 (L) 08/02/2019    HCT 28 9 (L) 08/02/2019    MCV 93 08/02/2019     08/02/2019    MCH 27 6 08/02/2019    MCHC 29 8 (L) 08/02/2019    RDW 16 4 (H) 08/02/2019    MPV 9 9 08/02/2019    NRBC 1 08/02/2019   , CMP:   Lab Results   Component Value Date    SODIUM 139 08/02/2019    K 4 5 08/02/2019     08/02/2019    CO2 28 08/02/2019    BUN 45 (H) 08/02/2019    CREATININE 1 60 (H) 08/02/2019    CALCIUM 8 3 08/02/2019    EGFR 44 08/02/2019   , Coagulation:   Lab Results   Component Value Date    INR 1 23 (H) 08/02/2019     VTE Pharmacologic Prophylaxis: Heparin, Coumadin  VTE Mechanical Prophylaxis: none, AKA and PAU Sánchez PA-C

## 2019-08-02 NOTE — PROGRESS NOTES
Progress Note - Shayy Marin 1950, 79 y o  male MRN: [id number]  Unit/Bed#: [address] [place] [address] Encounter: [id number]  Primary Care Provider: Steve Weems MD   Date and time admitted to hospital: 7/25/2019  1:04 PM        * Type 2 diabetes mellitus with left diabetic foot ulcer [facility])  Assessment & Plan  Lab Results   Component Value Date    HGBA1C 9 4 (H) 12/27/2018       Recent Labs     07/26/19  1106 07/26/19  1627 07/26/19  2138 07/27/19  0749   POCGLU 151* 105 64* 136     · 07/31/2019: Left AKA  · Continue IV Vanco/Cefepime as per ID, antibiotic stewardship as per them  · Continue Novolog/Lantus/sliding scale coverage  · Podiatry, ID general surgery on board  · Continue with current pain regimen  Chronic venous stasis dermatitis of both lower extremities  Assessment & Plan  · Chronic, POA  · podiatry/wound care  Acute on chronic diastolic congestive heart failure ([facility] )  Assessment & Plan  · Patient mild volume overload post surgery  · Aggressive IV diuresis (Lasix 80 IV q12h)  as per Cardiology on 08/02/2019  · Continue beta-blockers/Aldactone/atorvastatin  Weight (last 2 days)     Date/Time   Weight    08/02/19 0510   (!) 174 (382 94)    08/01/19 0600   (!) 178 (393 3)    07/31/19 0600   (!) 167 (368 9)              Intake/Output Summary (Last 24 hours) at 8/2/2019 1002  Last data filed at 8/1/2019 2300  Gross per 24 hour   Intake --   Output 850 ml   Net -850 ml             Chronic atrial fibrillation ([facility] )  Assessment & Plan  · Patient has been resumed on heparin drip and Coumadin on 08/01/2019 after discussion with surgery  · Continue Lopressor  · Appreciate input from Cardiology      Essential hypertension  Assessment & Plan  · continue beta-blocker/Aldactone/Cozaar    Gout  Assessment & Plan  · continue allopurinol    Moderate persistent asthma without complication  Assessment & Plan  · cont albuterol/Advair    Morbid obesity ([facility] )  Assessment & Plan  · Encourage weight loss    Acute renal failure superimposed on stage 3 chronic kidney disease ([facility] )  Assessment & Plan  · Creatinine 1 6 today slight bump from baseline of 1 2-14, most likely secondary to IV diuresis  · If creatinine continues to rise up consider Nephrology consultation  · Ultrasound renals = No evidence of nephrolithiasis or hydronephrosis  Stable left renal cyst measuring 1 3 cm  Unremarkable bladder  VTE PROPHYLAXIS:  Heparin gtt + warfarin + SCDs  EDUCATION AND DISCUSSIONS WITH PATIENT/FAMILY: Patient patient's significant other Nikky Landaverde have been updated  CURRENT LENGTH OF STAY: 8     CURRENT PATIENT STATUS: Inpatient     CERTIFICATION STATEMENT: The patient will continue to require additional inpatient hospital stay due to ongoing goals of care discussion as mentioned below  PATIENT CENTERED ROUNDS: I have performed bedside rounds with nursing staff today  ESTIMATED DISCHARGE DATE:  Anticipate discharge next 4-5 days  CODE STATUS: Level 1 - Full Code      ______________________________________________________________________    SUBJECTIVE:   Patient seen and examined  POD#2: left AKA  Short of breath overnight placed on oxygen 2 LPM   Pain control in amputated limb  Patient's significant other at bedside updated  OBJECTIVE:     Vitals:   Temp:  [98 °F (36 7 °C)-99 3 °F (37 4 °C)] 98 2 °F (36 8 °C)  HR:  [65-92] 65  Resp:  [17-18] 18  BP: (128-147)/(63-77) 147/68  SpO2:  [88 %-94 %] 94 %  Temp (24hrs), Av 5 °F (36 9 °C), Min:98 °F (36 7 °C), Max:99 3 °F (37 4 °C)  Current: Temperature: 98 2 °F (36 8 °C)    Intake/Output Summary (Last 24 hours) at 2019 1003  Last data filed at 2019 2300  Gross per 24 hour   Intake --   Output 850 ml   Net -850 ml       Physical Exam:   GENERAL: AAO x 3  Morbid obesity  HEENT: atraumatic, normocephalic  Oral mucosa moist, no icterus, pallor  PERRLA +  Neck supple, no JVD, no lymphadenopathy, no thryomegaly    CHEST: B/L breath sounds heard, occasional wheezing  CVS: S1, S2   ABDOMEN: Soft/obese/NT/BS heard  NEUROLOGICAL: CN II -XI grossly intact  No focal motor or sensory deficits  No signs of meningeal irritation or cerebellar dysfunction  EXTREMITIES:  Left AKA dressing intact  Right lower extremity pitting pedal edema secondary chronic lymphedema      LABS:   8/2/2019 06:23 8/2/2019 07:38   POC Glucose  156 (H)   Sodium 139    Chloride 104    CO2 28    Anion Gap 7    BUN 45 (H)    Creatinine 1 60 (H)    Glucose, Random 151 (H)    Calcium 8 3    eGFR 44    WBC 9 42    Red Blood Cell Count 3 12 (L)    Hemoglobin 8 6 (L)    HCT 28 9 (L)    MCV 93    MCH 27 6    MCHC 29 8 (L)    RDW 16 4 (H)    Platelet Count 673    MPV 9 9    nRBC 1    Neutrophils % 66    Immat GRANS % 5 (H)    Lymphocytes Relative 16    Monocytes Relative 9    Eosinophils 3    Basophils Relative 1    Immature Grans Absolute 0 44 (H)    Absolute Neutrophils 6 29    Lymphocytes Absolute 1 51    Absolute Monocytes 0 81    Absolute Eosinophils 0 29    Basophils Absolute 0 08    Protime 15 2 (H)    INR 1 23 (H)    PTT 49 (H)    VANCOMYCIN TROUGH 21 9 (HH)        Scheduled Meds[de-identified]    Current Facility-Administered Medications:  acetaminophen 650 mg Oral Q6H PRN Suzette Astl-Raymond, PA-C    albuterol 2 puff Inhalation Q6H PRN Suzette Astl-Raymond, PA-C    allopurinol 300 mg Oral Daily Suzette Astl-Raymond, PA-C    atorvastatin 20 mg Oral Daily Suzette Astl-Raymond, PA-C    cefepime 2,000 mg Intravenous Q12H Suzette Astl-Raymond, PA-C Last Rate: 2,000 mg (08/02/19 0245)   fluticasone-vilanterol 1 puff Inhalation Daily Suzette Astl-Raymond, PA-C    furosemide 80 mg Intravenous BID (diuretic) Ac Castle MD    heparin (porcine) 3-20 Units/kg/hr (Order-Specific) Intravenous Titrated Jacy Salinas MD Last Rate: 13 1 Units/kg/hr (08/02/19 0101)   HYDROcodone-acetaminophen 2 tablet Oral Q4H PRN Dalila Mora PA-C    HYDROmorphone 0 5 mg Intravenous Q2H PRN Dalila Mora PA-C    insulin glargine 14 Units Subcutaneous HS Suzette Sánchez PA-C    insulin lispro 2-12 Units Subcutaneous TID AC Suzette Astl-Raymond, PA-C    insulin lispro 30 Units Subcutaneous TID With Meals LESLEY Almazan-C    losartan 50 mg Oral Daily Jessica Flaherty MD    metoprolol tartrate 25 mg Oral Q12H [address] Suzette Sánchez PA-C    vancomycin 750 mg Intravenous Once PRN Stanley Woods MD    warfarin 5 mg Oral Daily (warfarin) Marilee Rosas MD      Continuous Infusion: NONE     heparin (porcine) 3-20 Units/kg/hr (Order-Specific) Last Rate: 13 1 Units/kg/hr (08/02/19 0101)     PRN Meds    acetaminophen    albuterol    HYDROcodone-acetaminophen    HYDROmorphone    vancomycin    Time Spent for Care: 20 minutes  More than 50% of total time spent on counseling and coordination of care as described above  Rosalie Ndiaye MD  HOSPITALIST SERVICES  8/2/2019    PLEASE NOTE:  This encounter was completed utilizing the [employer] Direct Speech Voice Recognition Software  Grammatical errors, random word insertions, pronoun errors and incomplete sentences are occasional consequences of the system due to software limitations, ambient noise and hardware issues  These may be missed by proof reading prior to affixing electronic signature  Any questions or concerns about the content, text or information contained within the body of this dictation should be directly addressed to the physician for clarification  Please do not hesitate to call me directly if you have any any questions or concerns

## 2019-08-02 NOTE — PLAN OF CARE
Problem: Potential for Falls  Goal: Patient will remain free of falls  Description  INTERVENTIONS:  - Assess patient frequently for physical needs  -  Identify cognitive and physical deficits and behaviors that affect risk of falls    -  [place] fall precautions as indicated by assessment   - Educate patient/family on patient safety including physical limitations  - Instruct patient to call for assistance with activity based on assessment  - Modify environment to reduce risk of injury  - Consider OT/PT consult to assist with strengthening/mobility  Outcome: Progressing     Problem: Prexisting or High Potential for Compromised Skin Integrity  Goal: Skin integrity is maintained or improved  Description  INTERVENTIONS:  - Identify patients at risk for skin breakdown  - Assess and monitor skin integrity  - Assess and monitor nutrition and hydration status  - Monitor labs (i e  albumin)  - Assess for incontinence   - Turn and reposition patient  - Assist with mobility/ambulation  - Relieve pressure over bony prominences  - Avoid friction and shearing  - Provide appropriate hygiene as needed including keeping skin clean and dry  - Evaluate need for skin moisturizer/barrier cream  - Collaborate with interdisciplinary team (i e  Nutrition, Rehabilitation, etc )   - Patient/family teaching  Outcome: Progressing     Problem: SKIN/TISSUE INTEGRITY - ADULT  Goal: Skin integrity remains intact  Description  INTERVENTIONS  - Identify patients at risk for skin breakdown  - Assess and monitor skin integrity  - Assess and monitor nutrition and hydration status  - Monitor labs (i e  albumin)  - Assess for incontinence   - Turn and reposition patient  - Assist with mobility/ambulation  - Relieve pressure over bony prominences  - Avoid friction and shearing  - Provide appropriate hygiene as needed including keeping skin clean and dry  - Evaluate need for skin moisturizer/barrier cream  - Collaborate with interdisciplinary team (i e  Nutrition, Rehabilitation, etc )   - Patient/family teaching  Outcome: Progressing  Goal: Incision(s), wounds(s) or drain site(s) healing without S/S of infection  Description  INTERVENTIONS  - Assess and document risk factors for skin impairment   - Assess and document dressing, incision, wound bed, drain sites and surrounding tissue  - Initiate Nutrition services consult and/or wound management as needed  Outcome: Progressing     Problem: MUSCULOSKELETAL - ADULT  Goal: Maintain or return mobility to safest level of function  Description  INTERVENTIONS:  - Assess patient's ability to carry out ADLs; assess patient's baseline for ADL function and identify physical deficits which impact ability to perform ADLs (bathing, care of mouth/teeth, toileting, grooming, dressing, etc )  - Assess/evaluate cause of self-care deficits   - Assess range of motion  - Assess patient's mobility; develop plan if impaired  - Assess patient's need for assistive devices and provide as appropriate  - Encourage maximum independence but intervene and supervise when necessary  - Involve family in performance of ADLs  - Assess for home care needs following discharge   - Request OT consult to assist with ADL evaluation and planning for discharge  - Provide patient education as appropriate  Outcome: Progressing     Problem: PAIN - ADULT  Goal: Verbalizes/displays adequate comfort level or baseline comfort level  Description  Interventions:  - Encourage patient to monitor pain and request assistance  - Assess pain using appropriate pain scale  - Administer analgesics based on type and severity of pain and evaluate response  - Implement non-pharmacological measures as appropriate and evaluate response  - Consider cultural and social influences on pain and pain management  - Notify physician/advanced practitioner if interventions unsuccessful or patient reports new pain  Outcome: Progressing     Problem: INFECTION - ADULT  Goal: Absence or prevention of progression during hospitalization  Description  INTERVENTIONS:  - Assess and monitor for signs and symptoms of infection  - Monitor lab/diagnostic results  - Monitor all insertion sites, i e  indwelling lines, tubes, and drains  - Monitor endotracheal (as able) and nasal secretions for changes in amount and color  - [place] appropriate cooling/warming therapies per order  - Administer medications as ordered  - Instruct and encourage patient and family to use good hand hygiene technique  - Identify and instruct in appropriate isolation precautions for identified infection/condition  Outcome: Progressing     Problem: SAFETY ADULT  Goal: Patient will remain free of falls  Description  INTERVENTIONS:  - Assess patient frequently for physical needs  -  Identify cognitive and physical deficits and behaviors that affect risk of falls    -  [place] fall precautions as indicated by assessment   - Educate patient/family on patient safety including physical limitations  - Instruct patient to call for assistance with activity based on assessment  - Modify environment to reduce risk of injury  - Consider OT/PT consult to assist with strengthening/mobility  Outcome: Progressing     Problem: DISCHARGE PLANNING  Goal: Discharge to home or other facility with appropriate resources  Description  INTERVENTIONS:  - Identify barriers to discharge w/patient and caregiver  - Arrange for needed discharge resources and transportation as appropriate  - Identify discharge learning needs (meds, wound care, etc )  - Arrange for interpretive services to assist at discharge as needed  - Refer to Case Management Department for coordinating discharge planning if the patient needs post-hospital services based on physician/advanced practitioner order or complex needs related to functional status, cognitive ability, or social support system  Outcome: Progressing     Problem: Knowledge Deficit  Goal: Patient/family/caregiver demonstrates understanding of disease process, treatment plan, medications, and discharge instructions  Description  Complete learning assessment and assess knowledge base  Interventions:  - Provide teaching at level of understanding  - Provide teaching via preferred learning methods  Outcome: Progressing     Problem: Nutrition/Hydration-ADULT  Goal: Nutrient/Hydration intake appropriate for improving, restoring or maintaining nutritional needs  Description  Monitor and assess patient's nutrition/hydration status for malnutrition (ex- brittle hair, bruises, dry skin, pale skin and conjunctiva, muscle wasting, smooth red tongue, and disorientation)  Collaborate with interdisciplinary team and initiate plan and interventions as ordered  Monitor patient's weight and dietary intake as ordered or per policy  Utilize nutrition screening tool and intervene per policy  Determine patient's food preferences and provide high-protein, high-caloric foods as appropriate       INTERVENTIONS:  - Monitor oral intake, urinary output, labs, and treatment plans  - Assess nutrition and hydration status and recommend course of action  - Evaluate amount of meals eaten  - Assist patient with eating if necessary   - Allow adequate time for meals  - Recommend/ encourage appropriate diets, oral nutritional supplements, and vitamin/mineral supplements  - Order, calculate, and assess calorie counts as needed  - Recommend, monitor, and adjust tube feedings and TPN/PPN based on assessed needs  - Assess need for intravenous fluids  - Provide specific nutrition/hydration education as appropriate  - Include patient/family/caregiver in decisions related to nutrition  Outcome: Progressing

## 2019-08-02 NOTE — PROGRESS NOTES
Progress Note - Infectious Disease   Hernan Blair 79 y o  male MRN: [id number]  Unit/Bed#: [address] [place] [address] Encounter: [id number]    Assessments:     Left lower extremity chronic diabetic foot with chronic venous stasis lymphedema with cellulitis  Most likely underlying chronic osteomyelitis  Status post left above-the-knee amputation July 31, 2019  -routine wound cultures grew out MRSA and Proteus mirabilis taken on admission  -MRI of the left lower extremity from July 29th concerning for acute infectious process near the left  Cuboid and metatarsal areas  -blood cultures on admission negative 7/25        Medical Hx of  Chronic atrial fibrillation  Hypertension  Gout  Morbid obesity  History of asthma  History of Charcot foot  Chronic lymphedema and venous stasis in the lower extremities bilaterally           Plan:     The patient has completed 7 total days of broad-spectrum IV antibiotics  Patient has no fevers and white cell count is less than 10,000 patient has no signs of active infection  We will discontinue all antibiotic therapy    Follow-up with the medical and surgical management recommendations    The Infectious Disease team with signing off of the patient's case   if the surgeons or [address] [place] [address] team have any additional questions about the patient, please do not hesitate to call me at any time        Selena Garcia DO  Infectious Disease  [facility]   Cell [phone]    Subjective: The patient denies fevers chills nausea vomiting upset stomach patient has no pain located at the surgical site from his above-the-knee amputation    REVIEW OF SYSTEMS  GENERAL/CONSTITUTIONAL: The patient denies fever, fatigue, weakness, weight gain or weight loss    HEAD, EYES, EARS, NOSE AND THROAT: Eyes - The patient denies pain, redness, loss of vision, double or blurred vision, flashing lights or spots, dryness, sore throats, and hoarseness   CARDIOVASCULAR: The patient denies chest pain, irregular heartbeats, sudden changes in heartbeat or palpitation, shortness of breath, difficulty breathing at night, swollen legs RESPIRATORY: The patient denies chronic dry cough, coughing up blood, coughing up mucus and wheezing  GASTROINTESTINAL: The patient denies decreased appetite, nausea, vomiting, vomiting blood or coffee ground material, heartburn  GENITOURINARY: The patient denies difficult urination, pain or burning with urination, blood in the urine, cloudy or smoky urine, frequent need to urinate  MUSCULOSKELETAL: The patient denies arm, buttock, thigh or calf cramps  No joint or muscle pain  No muscle weakness or tenderness  No joint swelling, neck pain, back pain or major orthopedic injuries  SKIN AND BREASTS: The patient denies easy bruising, skin redness, skin rash, hives, sensitivity to sun exposure, tightness, nodules or bumps, hair loss, color changes in the hands or feet with cold, breast lump, breast pain or nipple discharge  NEUROLOGIC: The patient denies headache, dizziness, fainting, muscle spasm, loss of consciousness, sensitivity or pain in the hands and feet or memory loss  PSYCHIATRIC: The patient denies depression with thoughts of suicide, voices in ?? head telling ? ? to do things and has not been seen for psychiatric counseling or treatment  ENDOCRINE: The patient denies intolerance to hot or cold temperature, flushing, fingernail changes, increased thirst, increased salt intake or decreased sexual desire  HEMATOLOGIC/LYMPHATIC: The patient denies anemia, bleeding tendency or clotting tendency  ALLERGIC/IMMUNOLOGIC: The patient denies rhinitis, asthma, skin sensitivity, latex allergies or sensitivity      Objective:     Temp:  [98 °F (36 7 °C)-99 3 °F (37 4 °C)] 98 7 °F (37 1 °C)  HR:  [61-92] 71  Resp:  [18] 18  BP: (129-148)/(59-77) 129/59  SpO2:  [89 %-94 %] 94 %  Temp (24hrs), Av 6 °F (37 °C), Min:98 °F (36 7 °C), Max:99 3 °F (37 4 °C)  Current: Temperature: 98 7 °F (37 1 °C)    Physical Exam:     GENERAL APPEARANCE: Well developed, well nourished, in no acute distress  Large body habitus  SKIN: Inspection of the skin reveals no rashes, ulcerations or petechiae  HEENT: The sclerae were anicteric and conjunctivae were pink and moist  Extraocular movements were intact and pupils were equal, round, and reactive to light with normal accommodation  NECK: Supple and symmetric  There was no thyroid enlargement, and no tenderness, or masses were felt  CHEST: Normal AP diameter and normal contour without any kyphoscoliosis  LUNGS: Auscultation of the lungs revealed normal breath sounds without any other adventitious sounds or rubs  CARDIOVASCULAR: There was a regular rate and rhythm without any murmurs, gallops, rubs  The carotid pulses were normal and 2+ bilaterally without bruits  ABDOMEN: Soft and nontender with normal bowel sounds  No ascites was noted  LYMPH NODES: No lymphadenopathy was appreciated in the neck, axillae or groin  MUSCULOSKELETAL: Gait was normal  There was no tenderness or effusions noted  Muscle strength and tone were normal   EXTREMITIES:  Patient has a left above-the-knee amputation and the surgical area is wrapped in surgical gauze and dressing  NEUROLOGIC: Alert and oriented x 3  Normal affect      Invasive Devices     Peripherally Inserted Central Catheter Line            PICC Line 62/02/30 Right Basilic 4 days          Peripheral Intravenous Line            Peripheral IV 07/29/19 Right;Ventral (anterior) Forearm 4 days                CBC w/diff  Recent Labs     08/02/19  0623   WBC 9 42   HGB 8 6*   HCT 28 9*      NEUTOPHILPCT 66   LYMPHOPCT 16   MONOPCT 9   EOSPCT 3     BMP  Recent Labs     08/02/19  0623   K 4 5      CO2 28   BUN 45*   CREATININE 1 60*   CALCIUM 8 3     CMP  Recent Labs     08/02/19  0623   K 4 5      CO2 28   BUN 45*   CREATININE 1 60*   CALCIUM 8 3        labrc    Cultures:  Lab Results Component Value Date    BLOODCX No Growth After 5 Days  07/25/2019    BLOODCX No Growth After 5 Days  07/25/2019    BLOODCX No Growth After 5 Days  12/25/2018    BLOODCX No Growth After 5 Days  12/25/2018    BLOODCX No Growth After 5 Days  03/23/2018    BLOODCX No Growth After 5 Days  03/23/2018    BLOODCX No Growth After 5 Days  10/08/2017    BLOODCX No Growth After 5 Days  10/08/2017    BLOODCX No Growth After 5 Days  09/15/2016    BLOODCX No Growth After 5 Days   09/15/2016     Lab Results   Component Value Date    WOUNDCULT 4+ Growth of Proteus mirabilis (A) 07/25/2019    WOUNDCULT 4+ Growth of  07/25/2019    WOUNDCULT (A) 07/25/2019     4+ Growth of Methicillin Resistant Staphylococcus aureus    WOUNDCULT 1+ Growth of Proteus mirabilis (A) 07/25/2019    WOUNDCULT 4+ Growth of  07/25/2019    WOUNDCULT 3+ Growth of Staphylococcus aureus (A) 12/25/2018    WOUNDCULT 2+ Growth of  12/25/2018    WOUNDCULT 4+ Growth of Staphylococcus aureus 09/15/2016    WOUNDCULT 4+ Growth of Proteus mirabilis 09/15/2016    WOUNDCULT 4+ Growth of Mixed Skin Nini 09/15/2016     No results found for: URINECX  No results found for: SPUTUMCULTUR    MED:      Current Facility-Administered Medications:     acetaminophen (TYLENOL) tablet 650 mg, 650 mg, Oral, Q6H PRN, Melvi De Los Santos Astl-Raymond, PA-C    albuterol (PROVENTIL HFA,VENTOLIN HFA) inhaler 2 puff, 2 puff, Inhalation, Q6H PRN, Suzette Astl-Raymond, PA-C, 2 puff at 07/31/19 0749    allopurinol (ZYLOPRIM) tablet 300 mg, 300 mg, Oral, Daily, Suzette Astl-Raymond, PA-C, 300 mg at 08/02/19 6897    atorvastatin (LIPITOR) tablet 20 mg, 20 mg, Oral, Daily, Suzette Astl-Raymond, PA-C, 20 mg at 08/02/19 0812    fluticasone-vilanterol (BREO ELLIPTA) 100-25 mcg/inh inhaler 1 puff, 1 puff, Inhalation, Daily, Suzette Sánchez PA-C, 1 puff at 08/02/19 0752    furosemide (LASIX) injection 80 mg, 80 mg, Intravenous, BID (diuretic), Lexi Patel MD, 80 mg at 08/02/19 0818    heparin (porcine) 25,000 units in 250 mL infusion (premix), 3-20 Units/kg/hr (Order-Specific), Intravenous, Titrated, Jessica Arnett MD, Last Rate: 13 6 mL/hr at 08/02/19 1320, 15 1 Units/kg/hr at 08/02/19 1320    HYDROcodone-acetaminophen (NORCO) 5-325 mg per tablet 2 tablet, 2 tablet, Oral, Q4H PRN, Mansi Mora PA-C, 2 tablet at 08/02/19 1156    HYDROmorphone (DILAUDID) injection 0 5 mg, 0 5 mg, Intravenous, Q2H PRN, Dalila Mora PA-C, 0 5 mg at 08/02/19 0022    insulin glargine (LANTUS) subcutaneous injection 14 Units 0 14 mL, 14 Units, Subcutaneous, HS, Suzette Sánchez PA-C, 14 Units at 08/01/19 2130    insulin lispro (HumaLOG) 100 units/mL subcutaneous injection 2-12 Units, 2-12 Units, Subcutaneous, TID AC, 2 Units at 08/02/19 1158 **AND** Fingerstick Glucose (POCT), , , TID AC, Suzette Sánchez PA-C    insulin lispro (HumaLOG) 100 units/mL subcutaneous injection 30 Units, 30 Units, Subcutaneous, TID With Meals, Gladystine Nilo Sánchez PA-C, 30 Units at 08/02/19 1158    losartan (COZAAR) tablet 50 mg, 50 mg, Oral, Daily, Jessica Murillo MD, 50 mg at 08/02/19 9225    metoprolol tartrate (LOPRESSOR) tablet 25 mg, 25 mg, Oral, Q12H [address], Suzette Sánchez PA-C, 25 mg at 08/02/19 3573    warfarin (COUMADIN) tablet 5 mg, 5 mg, Oral, Daily (warfarin), Gregory Diego MD, 5 mg at 08/01/19 2064    Principal Problem:    Type 2 diabetes mellitus with left diabetic foot ulcer ([address] )  Active Problems:    Gout    Chronic venous stasis dermatitis of both lower extremities    Essential hypertension    Chronic atrial fibrillation (HCC)    Morbid obesity ([facility] [address] )    Acute on chronic diastolic congestive heart failure (HCC)    Moderate persistent asthma without complication    Acute renal failure superimposed on stage 3 chronic kidney disease ([facility] [address] )    Skin ulcer of left foot with necrosis of muscle ([facility] [address] )    Left leg cellulitis      Jemma Michael DO

## 2019-08-02 NOTE — SOCIAL WORK
Continue to follow  Met with Pt and Pt's visitor at bedside  Pt still in agreement for [facility] upon discharge  Pt's visitor informed  that Pt's son is looking at places for Pt to go to after [facility] due to Pt's house is on the market  Pt aware  spoke with Pt's son re: options, such as private pay for nursing home after rehab or assisted living  Pt's son will speak with [facility] admissions and also look into [employer]  Will need to obtain insurance auth for SNF prior to discharge  Will follow up on 8/5/19 for discharge planning

## 2019-08-03 LAB
ANION GAP SERPL CALCULATED.3IONS-SCNC: 11 MMOL/L (ref 4–13)
ANION GAP SERPL CALCULATED.3IONS-SCNC: 9 MMOL/L (ref 4–13)
ANISOCYTOSIS BLD QL SMEAR: PRESENT
APTT PPP: 45 SECONDS (ref 23–37)
APTT PPP: 48 SECONDS (ref 23–37)
APTT PPP: 54 SECONDS (ref 23–37)
APTT PPP: 61 SECONDS (ref 23–37)
BASOPHILS # BLD MANUAL: 0.09 THOUSAND/UL (ref 0–0.1)
BASOPHILS NFR MAR MANUAL: 1 % (ref 0–1)
BUN SERPL-MCNC: 54 MG/DL (ref 5–25)
BUN SERPL-MCNC: 63 MG/DL (ref 5–25)
CALCIUM SERPL-MCNC: 8.4 MG/DL (ref 8.3–10.1)
CALCIUM SERPL-MCNC: 8.7 MG/DL (ref 8.3–10.1)
CHLORIDE SERPL-SCNC: 101 MMOL/L (ref 100–108)
CHLORIDE SERPL-SCNC: 103 MMOL/L (ref 100–108)
CO2 SERPL-SCNC: 25 MMOL/L (ref 21–32)
CO2 SERPL-SCNC: 27 MMOL/L (ref 21–32)
CREAT SERPL-MCNC: 1.88 MG/DL (ref 0.6–1.3)
CREAT SERPL-MCNC: 2.14 MG/DL (ref 0.6–1.3)
EOSINOPHIL # BLD MANUAL: 0.17 THOUSAND/UL (ref 0–0.4)
EOSINOPHIL NFR BLD MANUAL: 2 % (ref 0–6)
ERYTHROCYTE [DISTWIDTH] IN BLOOD BY AUTOMATED COUNT: 16.6 % (ref 11.6–15.1)
GFR SERPL CREATININE-BSD FRML MDRD: 31 ML/MIN/1.73SQ M
GFR SERPL CREATININE-BSD FRML MDRD: 36 ML/MIN/1.73SQ M
GLUCOSE SERPL-MCNC: 170 MG/DL (ref 65–140)
GLUCOSE SERPL-MCNC: 171 MG/DL (ref 65–140)
GLUCOSE SERPL-MCNC: 175 MG/DL (ref 65–140)
GLUCOSE SERPL-MCNC: 195 MG/DL (ref 65–140)
GLUCOSE SERPL-MCNC: 200 MG/DL (ref 65–140)
GLUCOSE SERPL-MCNC: 271 MG/DL (ref 65–140)
HCT VFR BLD AUTO: 27.9 % (ref 36.5–49.3)
HGB BLD-MCNC: 8.2 G/DL (ref 12–17)
HYPERCHROMIA BLD QL SMEAR: PRESENT
INR PPP: 1.2 (ref 0.84–1.19)
LYMPHOCYTES # BLD AUTO: 1.56 THOUSAND/UL (ref 0.6–4.47)
LYMPHOCYTES # BLD AUTO: 18 % (ref 14–44)
MACROCYTES BLD QL AUTO: PRESENT
MCH RBC QN AUTO: 27.2 PG (ref 26.8–34.3)
MCHC RBC AUTO-ENTMCNC: 29.4 G/DL (ref 31.4–37.4)
MCV RBC AUTO: 93 FL (ref 82–98)
MONOCYTES # BLD AUTO: 0.61 THOUSAND/UL (ref 0–1.22)
MONOCYTES NFR BLD: 7 % (ref 4–12)
NEUTROPHILS # BLD MANUAL: 6.24 THOUSAND/UL (ref 1.85–7.62)
NEUTS SEG NFR BLD AUTO: 72 % (ref 43–75)
NRBC BLD AUTO-RTO: 1 /100 WBCS
PLATELET # BLD AUTO: 310 THOUSANDS/UL (ref 149–390)
PLATELET BLD QL SMEAR: ADEQUATE
PMV BLD AUTO: 9.8 FL (ref 8.9–12.7)
POLYCHROMASIA BLD QL SMEAR: PRESENT
POTASSIUM SERPL-SCNC: 4.6 MMOL/L (ref 3.5–5.3)
POTASSIUM SERPL-SCNC: 5.1 MMOL/L (ref 3.5–5.3)
PROTHROMBIN TIME: 14.9 SECONDS (ref 11.6–14.5)
RBC # BLD AUTO: 3.01 MILLION/UL (ref 3.88–5.62)
RBC MORPH BLD: PRESENT
SODIUM SERPL-SCNC: 137 MMOL/L (ref 136–145)
SODIUM SERPL-SCNC: 139 MMOL/L (ref 136–145)
TOTAL CELLS COUNTED SPEC: 100
WBC # BLD AUTO: 8.67 THOUSAND/UL (ref 4.31–10.16)

## 2019-08-03 PROCEDURE — 85730 THROMBOPLASTIN TIME PARTIAL: CPT | Performed by: NURSE PRACTITIONER

## 2019-08-03 PROCEDURE — 85027 COMPLETE CBC AUTOMATED: CPT | Performed by: INTERNAL MEDICINE

## 2019-08-03 PROCEDURE — 80048 BASIC METABOLIC PNL TOTAL CA: CPT | Performed by: INTERNAL MEDICINE

## 2019-08-03 PROCEDURE — 85730 THROMBOPLASTIN TIME PARTIAL: CPT | Performed by: INTERNAL MEDICINE

## 2019-08-03 PROCEDURE — 94760 N-INVAS EAR/PLS OXIMETRY 1: CPT

## 2019-08-03 PROCEDURE — 85610 PROTHROMBIN TIME: CPT | Performed by: INTERNAL MEDICINE

## 2019-08-03 PROCEDURE — 82948 REAGENT STRIP/BLOOD GLUCOSE: CPT

## 2019-08-03 PROCEDURE — 99232 SBSQ HOSP IP/OBS MODERATE 35: CPT | Performed by: INTERNAL MEDICINE

## 2019-08-03 PROCEDURE — 85007 BL SMEAR W/DIFF WBC COUNT: CPT | Performed by: INTERNAL MEDICINE

## 2019-08-03 PROCEDURE — 94640 AIRWAY INHALATION TREATMENT: CPT

## 2019-08-03 RX ORDER — HEPARIN SODIUM 1000 [USP'U]/ML
4000 INJECTION, SOLUTION INTRAVENOUS; SUBCUTANEOUS AS NEEDED
Status: DISCONTINUED | OUTPATIENT
Start: 2019-08-03 | End: 2019-08-10

## 2019-08-03 RX ORDER — HYDRALAZINE HYDROCHLORIDE 20 MG/ML
10 INJECTION INTRAMUSCULAR; INTRAVENOUS EVERY 6 HOURS PRN
Status: DISCONTINUED | OUTPATIENT
Start: 2019-08-03 | End: 2019-08-12 | Stop reason: HOSPADM

## 2019-08-03 RX ORDER — HEPARIN SODIUM 10000 [USP'U]/100ML
3-25 INJECTION, SOLUTION INTRAVENOUS
Status: DISCONTINUED | OUTPATIENT
Start: 2019-08-03 | End: 2019-08-10

## 2019-08-03 RX ORDER — HEPARIN SODIUM 1000 [USP'U]/ML
2000 INJECTION, SOLUTION INTRAVENOUS; SUBCUTANEOUS AS NEEDED
Status: DISCONTINUED | OUTPATIENT
Start: 2019-08-03 | End: 2019-08-10

## 2019-08-03 RX ADMIN — HEPARIN SODIUM AND DEXTROSE 13.1 UNITS/KG/HR: 10000; 5 INJECTION INTRAVENOUS at 23:44

## 2019-08-03 RX ADMIN — HEPARIN SODIUM AND DEXTROSE 20 UNITS/KG/HR: 10000; 5 INJECTION INTRAVENOUS at 21:21

## 2019-08-03 RX ADMIN — INSULIN LISPRO 2 UNITS: 100 INJECTION, SOLUTION INTRAVENOUS; SUBCUTANEOUS at 09:27

## 2019-08-03 RX ADMIN — HEPARIN SODIUM 2000 UNITS: 1000 INJECTION, SOLUTION INTRAVENOUS; SUBCUTANEOUS at 23:44

## 2019-08-03 RX ADMIN — HYDROCODONE BITARTRATE AND ACETAMINOPHEN 2 TABLET: 5; 325 TABLET ORAL at 21:24

## 2019-08-03 RX ADMIN — FUROSEMIDE 80 MG: 10 INJECTION, SOLUTION INTRAVENOUS at 17:47

## 2019-08-03 RX ADMIN — HYDROCODONE BITARTRATE AND ACETAMINOPHEN 2 TABLET: 5; 325 TABLET ORAL at 06:09

## 2019-08-03 RX ADMIN — METOPROLOL TARTRATE 25 MG: 25 TABLET, FILM COATED ORAL at 09:26

## 2019-08-03 RX ADMIN — ATORVASTATIN CALCIUM 20 MG: 20 TABLET, FILM COATED ORAL at 09:26

## 2019-08-03 RX ADMIN — ALLOPURINOL 300 MG: 300 TABLET ORAL at 09:26

## 2019-08-03 RX ADMIN — WARFARIN SODIUM 5 MG: 5 TABLET ORAL at 17:47

## 2019-08-03 RX ADMIN — FLUTICASONE FUROATE AND VILANTEROL TRIFENATATE 1 PUFF: 100; 25 POWDER RESPIRATORY (INHALATION) at 08:57

## 2019-08-03 RX ADMIN — INSULIN LISPRO 30 UNITS: 100 INJECTION, SOLUTION INTRAVENOUS; SUBCUTANEOUS at 12:15

## 2019-08-03 RX ADMIN — INSULIN LISPRO 6 UNITS: 100 INJECTION, SOLUTION INTRAVENOUS; SUBCUTANEOUS at 12:15

## 2019-08-03 RX ADMIN — INSULIN LISPRO 2 UNITS: 100 INJECTION, SOLUTION INTRAVENOUS; SUBCUTANEOUS at 17:48

## 2019-08-03 RX ADMIN — INSULIN GLARGINE 14 UNITS: 100 INJECTION, SOLUTION SUBCUTANEOUS at 21:24

## 2019-08-03 RX ADMIN — FUROSEMIDE 80 MG: 10 INJECTION, SOLUTION INTRAVENOUS at 09:26

## 2019-08-03 RX ADMIN — HEPARIN SODIUM AND DEXTROSE 19 UNITS/KG/HR: 10000; 5 INJECTION INTRAVENOUS at 06:06

## 2019-08-03 RX ADMIN — INSULIN LISPRO 30 UNITS: 100 INJECTION, SOLUTION INTRAVENOUS; SUBCUTANEOUS at 17:48

## 2019-08-03 RX ADMIN — METOPROLOL TARTRATE 25 MG: 25 TABLET, FILM COATED ORAL at 21:24

## 2019-08-03 RX ADMIN — INSULIN LISPRO 30 UNITS: 100 INJECTION, SOLUTION INTRAVENOUS; SUBCUTANEOUS at 09:27

## 2019-08-03 NOTE — PROGRESS NOTES
Progress Note - Lashanda Koenig 1950, 79 y o  male MRN: [id number]    Unit/Bed#: [address] [place] [address] Encounter: [id number]    Primary Care Provider: Renita Estrada MD   Date and time admitted to hospital: 7/25/2019  1:04 PM        Acute renal failure superimposed on stage 3 chronic kidney disease ([facility] )  Assessment & Plan  · Creatinine 1 88 today from baseline of 1 2-14  · Nephrology consultation  · Ultrasound renals = No evidence of nephrolithiasis or hydronephrosis  Stable left renal cyst measuring 1 3 cm  Unremarkable bladder  · Bladder scan and PVRs  · Avoid hypotension/NSAIDs    Moderate persistent asthma without complication  Assessment & Plan  · cont albuterol/Advair    Acute on chronic diastolic congestive [facility] failure ([facility]  Assessment & Plan  · Patient had volume overload post surgery  · Aggressive IV diuresis (Lasix 80 IV q12h)  as per Cardiology on 08/02/2019  · Continue beta-blockers/Aldactone/atorvastatin  Weight (last 2 days)     Date/Time   Weight    08/02/19 0510   (!) 174 (382 94)    08/01/19 0600   (!) 178 (393 3)    07/31/19 0600   (!) 167 (368 9)              Intake/Output Summary (Last 24 hours) at 8/3/2019 0949  Last data filed at 8/3/2019 0530  Gross per 24 hour   Intake 300 ml   Output 400 ml   Net -100 ml             Morbid obesity ([facility] )  Assessment & Plan  · Encourage weight loss    Chronic atrial fibrillation ([facility] )  Assessment & Plan  · Patient has been on heparin drip and Coumadin on 08/01/2019 after discussion with surgery  Overlap until INR is therapeutic  · Continue Lopressor  · Cardiology on board    Essential hypertension  Assessment & Plan  · continue Lasix/ beta-blocker/Aldactone  · Cozaar on hold    Chronic venous stasis dermatitis of both lower extremities  Assessment & Plan  · Chronic, POA  · S/P left AKA  · podiatry/wound care      Gout  Assessment & Plan  · continue allopurinol    * Type 2 diabetes mellitus with left diabetic foot ulcer (HCC)  Assessment & Plan  Lab Results   Component Value Date    HGBA1C 9 4 (H) 12/27/2018       Recent Labs     07/26/19  1106 07/26/19  1627 07/26/19  2138 07/27/19  0749   POCGLU 151* 105 64* 136     · 07/31/2019: Left AKA  · Patient completed the course of IV Vanco/Cefepime as per ID  · Monitor WBC and fever curve  · Continue Novolog/Lantus/sliding scale coverage  · Podiatry, ID general surgery on board  · Continue with current pain regimen  Labs & Imaging: I have personally reviewed pertinent reports  VTE Pharmacologic Prophylaxis: Heparin and Warfarin (Coumadin)  VTE Mechanical Prophylaxis: sequential compression device    Code Status:   Level 1 - Full Code    Patient Centered Rounds: I have performed bedside rounds with nursing staff today  Current Length of Stay: 9 day(s)    Current Patient Status: Inpatient   Certification Statement: The patient will continue to require additional inpatient hospital stay due to see my assessment and plan  Subjective:   Patient is seen and examined at bedside  Pain is well controlled  Denies any new complaints  Afebrile  All other ROS are negative  Objective:    Vitals: Blood pressure 161/78, pulse 86, temperature 98 2 °F (36 8 °C), temperature source Oral, resp  rate 17, height 6' 3" (1 905 m), weight (!) 176 kg (386 lb 14 5 oz), SpO2 94 %  ,Body mass index is 48 36 kg/m²  SPO2 RA Rest      ED to Hosp-Admission (Current) from 7/25/2019 in [facility]   SpO2  94 %   SpO2 Activity  At Rest   O2 Device  None (Room air)   O2 Flow Rate  2 L/min        I&O:     Intake/Output Summary (Last 24 hours) at 8/3/2019 0952  Last data filed at 8/3/2019 0530  Gross per 24 hour   Intake 300 ml   Output 400 ml   Net -100 ml       Physical Exam:    General- Alert, lying comfortably in bed  Not in any acute distress  HEENT- JENNIE, EOM intact    Neck- Supple, No JVD  CVS- regular, S1 and S2 normal  Chest- Bilateral Air entry, No rhochi, crackles or wheezing present  Abdomen- soft, nontender, not distended, no guarding or rigidity, BS+  Extremities- has RLL pedal edema  Left AKA  Dressing intact  CNS-   Alert, awake and orientedx3  No focal deficits present      Invasive Devices     Peripherally Inserted Central Catheter Line            PICC Line 28/35/85 Right Basilic 4 days                      Social History  reviewed  Family History   Problem Relation Age of Onset    Other Mother         CARDIAC DISORDER     Diabetes Mother     Cancer Father     Other Family         CARDIAC DISORDER     Diabetes Family     Cancer Family     Mental illness Family     reviewed    Meds:  Current Facility-Administered Medications   Medication Dose Route Frequency Provider Last Rate Last Dose    acetaminophen (TYLENOL) tablet 650 mg  650 mg Oral Q6H PRN Suzettejessica Dumontl-Raymond PA-C        albuterol (PROVENTIL HFA,VENTOLIN HFA) inhaler 2 puff  2 puff Inhalation Q6H PRN Suzette Astl-Raymond, PA-C   2 puff at 07/31/19 0749    allopurinol (ZYLOPRIM) tablet 300 mg  300 mg Oral Daily Suzette Astl-Raymond, PA-C   300 mg at 08/03/19 0926    atorvastatin (LIPITOR) tablet 20 mg  20 mg Oral Daily Suzette Astl-Raymond, PA-C   20 mg at 08/03/19 0926    fluticasone-vilanterol (BREO ELLIPTA) 100-25 mcg/inh inhaler 1 puff  1 puff Inhalation Daily Suzette Astl-Raymond, PA-C   1 puff at 08/03/19 0857    furosemide (LASIX) injection 80 mg  80 mg Intravenous BID (diuretic) Sherri Armstrong MD   80 mg at 08/03/19 0926    heparin (porcine) 25,000 units in 250 mL infusion (premix)  3-20 Units/kg/hr (Order-Specific) Intravenous Titrated Mary Jane Quan MD 17 1 mL/hr at 08/03/19 0606 19 Units/kg/hr at 08/03/19 0606    hydrALAZINE (APRESOLINE) injection 10 mg  10 mg Intravenous Q6H PRN Kurt Meadows MD        HYDROcodone-acetaminophen (NORCO) 5-325 mg per tablet 2 tablet  2 tablet Oral Q4H PRN LESLEY Sullivan-C   2 tablet at 08/03/19 0609    HYDROmorphone (DILAUDID) injection 0 5 mg 0 5 mg Intravenous Q2H PRN Clarissa Mora PA-C   0 5 mg at 08/02/19 2114    insulin glargine (LANTUS) subcutaneous injection 14 Units 0 14 mL  14 Units Subcutaneous HS Suzette Sánchez PA-C   14 Units at 08/02/19 2112    insulin lispro (HumaLOG) 100 units/mL subcutaneous injection 2-12 Units  2-12 Units Subcutaneous TID [facility] [place] Suzette Sánchez PA-C   2 Units at 08/03/19 9803    insulin lispro (HumaLOG) 100 units/mL subcutaneous injection 30 Units  30 Units Subcutaneous TID With Meals Suzette Sánchez PA-C   30 Units at 08/03/19 0927    metoprolol tartrate (LOPRESSOR) tablet 25 mg  25 mg Oral Q12H [address] Suzette Sánchez PA-C   25 mg at 08/03/19 7540    warfarin (COUMADIN) tablet 5 mg  5 mg Oral Daily (warfarin) Jeri Padron MD   5 mg at 08/02/19 1722      Medications Prior to Admission   Medication    albuterol (PROVENTIL HFA,VENTOLIN HFA) 90 mcg/act inhaler    allopurinol (ZYLOPRIM) 300 mg tablet    atorvastatin (LIPITOR) 20 mg tablet    BD INSULIN SYRINGE U/F 31G X 5/16" 0 5 ML MISC    BD PEN NEEDLE HILARIO U/F 32G X 4 MM MISC    fluticasone-salmeterol (ADVAIR DISKUS, WIXELA INHUB) 250-50 mcg/dose inhaler    glucose blood (ONE TOUCH ULTRA TEST) test strip    insulin aspart (NOVOLOG FLEXPEN) 100 Units/mL injection pen    Insulin Pen Needle 31G X 5 MM MISC    losartan (COZAAR) 50 mg tablet    metFORMIN (GLUCOPHAGE) 1000 MG tablet    metoprolol tartrate (LOPRESSOR) 25 mg tablet    torsemide (DEMADEX) 20 mg tablet    warfarin (COUMADIN) 5 mg tablet       Labs:  Results from last 7 days   Lab Units 08/03/19  0537 08/02/19  0623 08/01/19  1616 08/01/19  0457  07/30/19  0520   WBC Thousand/uL 8 67 9 42 9 92 9 82   < > 7 57   HEMOGLOBIN g/dL 8 2* 8 6* 9 0* 9 2*   < > 9 7*   HEMATOCRIT % 27 9* 28 9* 30 8* 31 5*   < > 32 8*   PLATELETS Thousands/uL 310 265 308 261   < > 356   NEUTROS PCT %  --  66  --  73  --  63   LYMPHS PCT %  --  16  --  12*  --  21   LYMPHO PCT % 18  --   --   --    < > --    MONOS PCT %  --  9  --  8  --  10   MONO PCT % 7  --   --   --    < >  --    EOS PCT % 2 3  --  3   < > 3    < > = values in this interval not displayed  Results from last 7 days   Lab Units 08/03/19  0537 08/02/19  0623 08/01/19  0457   POTASSIUM mmol/L 4 6 4 5 5 1   CHLORIDE mmol/L 103 104 105   CO2 mmol/L 25 28 26   BUN mg/dL 54* 45* 42*   CREATININE mg/dL 1 88* 1 60* 1 39*   CALCIUM mg/dL 8 7 8 3 8 8     Lab Results   Component Value Date    TROPONINI <0 02 03/23/2018    TROPONINI <0 02 10/08/2017    CKTOTAL 53 03/23/2018    CKTOTAL 55 10/08/2017     Results from last 7 days   Lab Units 08/03/19 0537 08/02/19  0623 08/01/19  1617   INR  1 20* 1 23* 1 19     Lab Results   Component Value Date    BLOODCX No Growth After 5 Days  07/25/2019    BLOODCX No Growth After 5 Days  07/25/2019    BLOODCX No Growth After 5 Days  12/25/2018    WOUNDCULT 4+ Growth of Proteus mirabilis (A) 07/25/2019    WOUNDCULT 4+ Growth of  07/25/2019    WOUNDCULT (A) 07/25/2019     4+ Growth of Methicillin Resistant Staphylococcus aureus    WOUNDCULT 1+ Growth of Proteus mirabilis (A) 07/25/2019    WOUNDCULT 4+ Growth of  07/25/2019         Imaging:  Results for orders placed during the hospital encounter of 03/23/18   XR chest 1 view portable    Narrative CHEST     INDICATION:   foot ulcer  COMPARISON:  October 8, 2017    EXAM PERFORMED/VIEWS:  XR CHEST PORTABLE  1 image    FINDINGS:    Heart enlarged  Pulmonary vessels unremarkable  Lungs and pleural spaces clear  Osseous structures appear within normal limits for patient age  Impression No acute abnormality in the chest         Workstation performed: [id number]       Results for orders placed in visit on 05/02/19   XR chest pa & lateral    Narrative CHEST     INDICATION:   J45 40: Moderate persistent asthma, uncomplicated  Q79 45: Dyspnea, unspecified      COMPARISON:  One view chest 3/23/2018    EXAM PERFORMED/VIEWS:  XR CHEST PA & LATERAL      FINDINGS:    Cardiac silhouette is enlarged  Aortic calcification is present  Minimal bibasilar subsegmental atelectasis left greater than right  No pneumothorax, or pleural effusion  Mild distention of central pulmonary vessels  Multilevel thoracolumbar spondylosis  Impression Mild central pulmonary vascular congestion  Minimal bibasilar subsegmental atelectasis left greater than right  Workstation performed: [id number]         Last 24 Hours Medication List:     Current Facility-Administered Medications:  acetaminophen 650 mg Oral Q6H PRN Eliceo Fitchuet Astl-Raymond, PA-C    albuterol 2 puff Inhalation Q6H PRN Eliceo Hoguet Astl-Raymond, PA-C    allopurinol 300 mg Oral Daily Suzette Astl-Raymond, PA-C    atorvastatin 20 mg Oral Daily Suzette Astl-Raymond, PA-C    fluticasone-vilanterol 1 puff Inhalation Daily Suzette Astl-Raymond, PA-C    furosemide 80 mg Intravenous BID (diuretic) Analia Mane MD    heparin (porcine) 3-20 Units/kg/hr (Order-Specific) Intravenous Titrated Llewellyn Crigler, MD Last Rate: 19 Units/kg/hr (08/03/19 0606)   hydrALAZINE 10 mg Intravenous Q6H PRN Matthew Carr MD    HYDROcodone-acetaminophen 2 tablet Oral Q4H PRN LESLEY Aquino-SHANEKA    HYDROmorphone 0 5 mg Intravenous Q2H PRN LESLEY Aquino-SHANEKA    insulin glargine 14 Units Subcutaneous HS Suzette Astl-Raymond, PA-C    insulin lispro 2-12 Units Subcutaneous TID AC Suzette Astl-Raymond, PA-C    insulin lispro 30 Units Subcutaneous TID With Meals Eliceo Dumontl-Raymond, PA-C    metoprolol tartrate 25 mg Oral Q12H [address] Suzette Astl-Raymond, PA-C    warfarin 5 mg Oral Daily (warfarin) Llewellyn Crigler, MD         Today, Patient Was Seen By: Matthew Carr MD    ** Please Note: Dictation voice to text software may have been used in the creation of this document   **

## 2019-08-04 ENCOUNTER — APPOINTMENT (INPATIENT)
Dept: RADIOLOGY | Facility: HOSPITAL | Age: 69
DRG: 616 | End: 2019-08-04
Payer: COMMERCIAL

## 2019-08-04 LAB
ANION GAP SERPL CALCULATED.3IONS-SCNC: 10 MMOL/L (ref 4–13)
ANISOCYTOSIS BLD QL SMEAR: PRESENT
APTT PPP: 37 SECONDS (ref 23–37)
APTT PPP: 44 SECONDS (ref 23–37)
APTT PPP: 51 SECONDS (ref 23–37)
BACTERIA UR QL AUTO: ABNORMAL /HPF
BASOPHILS # BLD MANUAL: 0 THOUSAND/UL (ref 0–0.1)
BASOPHILS NFR MAR MANUAL: 0 % (ref 0–1)
BILIRUB UR QL STRIP: NEGATIVE
BUN SERPL-MCNC: 65 MG/DL (ref 5–25)
CALCIUM SERPL-MCNC: 8.4 MG/DL (ref 8.3–10.1)
CHLORIDE SERPL-SCNC: 103 MMOL/L (ref 100–108)
CLARITY UR: CLEAR
CO2 SERPL-SCNC: 26 MMOL/L (ref 21–32)
COLOR UR: YELLOW
CREAT SERPL-MCNC: 1.86 MG/DL (ref 0.6–1.3)
EOSINOPHIL # BLD MANUAL: 0.39 THOUSAND/UL (ref 0–0.4)
EOSINOPHIL NFR BLD MANUAL: 5 % (ref 0–6)
ERYTHROCYTE [DISTWIDTH] IN BLOOD BY AUTOMATED COUNT: 16.7 % (ref 11.6–15.1)
GFR SERPL CREATININE-BSD FRML MDRD: 37 ML/MIN/1.73SQ M
GLUCOSE SERPL-MCNC: 107 MG/DL (ref 65–140)
GLUCOSE SERPL-MCNC: 136 MG/DL (ref 65–140)
GLUCOSE SERPL-MCNC: 152 MG/DL (ref 65–140)
GLUCOSE SERPL-MCNC: 162 MG/DL (ref 65–140)
GLUCOSE SERPL-MCNC: 258 MG/DL (ref 65–140)
GLUCOSE UR STRIP-MCNC: NEGATIVE MG/DL
HCT VFR BLD AUTO: 27.9 % (ref 36.5–49.3)
HGB BLD-MCNC: 8.3 G/DL (ref 12–17)
HGB UR QL STRIP.AUTO: ABNORMAL
INR PPP: 1.16 (ref 0.84–1.19)
KETONES UR STRIP-MCNC: NEGATIVE MG/DL
LEUKOCYTE ESTERASE UR QL STRIP: NEGATIVE
LYMPHOCYTES # BLD AUTO: 1.54 THOUSAND/UL (ref 0.6–4.47)
LYMPHOCYTES # BLD AUTO: 20 % (ref 14–44)
MACROCYTES BLD QL AUTO: PRESENT
MAGNESIUM SERPL-MCNC: 2 MG/DL (ref 1.6–2.6)
MCH RBC QN AUTO: 27.4 PG (ref 26.8–34.3)
MCHC RBC AUTO-ENTMCNC: 29.7 G/DL (ref 31.4–37.4)
MCV RBC AUTO: 92 FL (ref 82–98)
MONOCYTES # BLD AUTO: 0.31 THOUSAND/UL (ref 0–1.22)
MONOCYTES NFR BLD: 4 % (ref 4–12)
NEUTROPHILS # BLD MANUAL: 5.17 THOUSAND/UL (ref 1.85–7.62)
NEUTS BAND NFR BLD MANUAL: 1 % (ref 0–8)
NEUTS SEG NFR BLD AUTO: 66 % (ref 43–75)
NITRITE UR QL STRIP: NEGATIVE
NON-SQ EPI CELLS URNS QL MICRO: ABNORMAL /HPF
NRBC BLD AUTO-RTO: 0 /100 WBCS
PH UR STRIP.AUTO: 5.5 [PH]
PHOSPHATE SERPL-MCNC: 4.8 MG/DL (ref 2.3–4.1)
PLATELET # BLD AUTO: 325 THOUSANDS/UL (ref 149–390)
PLATELET BLD QL SMEAR: ADEQUATE
PMV BLD AUTO: 9.9 FL (ref 8.9–12.7)
POLYCHROMASIA BLD QL SMEAR: PRESENT
POTASSIUM SERPL-SCNC: 5 MMOL/L (ref 3.5–5.3)
PROT UR STRIP-MCNC: ABNORMAL MG/DL
PROTHROMBIN TIME: 14.5 SECONDS (ref 11.6–14.5)
RBC # BLD AUTO: 3.03 MILLION/UL (ref 3.88–5.62)
RBC #/AREA URNS AUTO: ABNORMAL /HPF
RBC MORPH BLD: PRESENT
SODIUM 24H UR-SCNC: 81 MOL/L
SODIUM SERPL-SCNC: 139 MMOL/L (ref 136–145)
SP GR UR STRIP.AUTO: 1.02 (ref 1–1.03)
TOTAL CELLS COUNTED SPEC: 100
UROBILINOGEN UR QL STRIP.AUTO: 0.2 E.U./DL
VARIANT LYMPHS # BLD AUTO: 4 %
WBC # BLD AUTO: 7.71 THOUSAND/UL (ref 4.31–10.16)
WBC #/AREA URNS AUTO: ABNORMAL /HPF

## 2019-08-04 PROCEDURE — 84165 PROTEIN E-PHORESIS SERUM: CPT | Performed by: INTERNAL MEDICINE

## 2019-08-04 PROCEDURE — 94640 AIRWAY INHALATION TREATMENT: CPT

## 2019-08-04 PROCEDURE — 99223 1ST HOSP IP/OBS HIGH 75: CPT | Performed by: INTERNAL MEDICINE

## 2019-08-04 PROCEDURE — 99232 SBSQ HOSP IP/OBS MODERATE 35: CPT | Performed by: INTERNAL MEDICINE

## 2019-08-04 PROCEDURE — 86334 IMMUNOFIX E-PHORESIS SERUM: CPT | Performed by: PATHOLOGY

## 2019-08-04 PROCEDURE — 84165 PROTEIN E-PHORESIS SERUM: CPT | Performed by: PATHOLOGY

## 2019-08-04 PROCEDURE — 85027 COMPLETE CBC AUTOMATED: CPT | Performed by: INTERNAL MEDICINE

## 2019-08-04 PROCEDURE — 82948 REAGENT STRIP/BLOOD GLUCOSE: CPT

## 2019-08-04 PROCEDURE — 71045 X-RAY EXAM CHEST 1 VIEW: CPT

## 2019-08-04 PROCEDURE — 86335 IMMUNFIX E-PHORSIS/URINE/CSF: CPT | Performed by: HOSPITALIST

## 2019-08-04 PROCEDURE — 81001 URINALYSIS AUTO W/SCOPE: CPT | Performed by: INTERNAL MEDICINE

## 2019-08-04 PROCEDURE — 84166 PROTEIN E-PHORESIS/URINE/CSF: CPT | Performed by: INTERNAL MEDICINE

## 2019-08-04 PROCEDURE — 84100 ASSAY OF PHOSPHORUS: CPT | Performed by: INTERNAL MEDICINE

## 2019-08-04 PROCEDURE — 85730 THROMBOPLASTIN TIME PARTIAL: CPT | Performed by: INTERNAL MEDICINE

## 2019-08-04 PROCEDURE — 84166 PROTEIN E-PHORESIS/URINE/CSF: CPT | Performed by: PATHOLOGY

## 2019-08-04 PROCEDURE — 84300 ASSAY OF URINE SODIUM: CPT | Performed by: INTERNAL MEDICINE

## 2019-08-04 PROCEDURE — 94760 N-INVAS EAR/PLS OXIMETRY 1: CPT

## 2019-08-04 PROCEDURE — 97530 THERAPEUTIC ACTIVITIES: CPT

## 2019-08-04 PROCEDURE — 85007 BL SMEAR W/DIFF WBC COUNT: CPT | Performed by: INTERNAL MEDICINE

## 2019-08-04 PROCEDURE — 80048 BASIC METABOLIC PNL TOTAL CA: CPT | Performed by: INTERNAL MEDICINE

## 2019-08-04 PROCEDURE — 83883 ASSAY NEPHELOMETRY NOT SPEC: CPT | Performed by: INTERNAL MEDICINE

## 2019-08-04 PROCEDURE — 85610 PROTHROMBIN TIME: CPT | Performed by: INTERNAL MEDICINE

## 2019-08-04 PROCEDURE — 83735 ASSAY OF MAGNESIUM: CPT | Performed by: INTERNAL MEDICINE

## 2019-08-04 PROCEDURE — 86335 IMMUNFIX E-PHORSIS/URINE/CSF: CPT | Performed by: PATHOLOGY

## 2019-08-04 PROCEDURE — 85730 THROMBOPLASTIN TIME PARTIAL: CPT | Performed by: NURSE PRACTITIONER

## 2019-08-04 RX ORDER — CLOTRIMAZOLE 1 %
CREAM (GRAM) TOPICAL 2 TIMES DAILY
Status: DISCONTINUED | OUTPATIENT
Start: 2019-08-04 | End: 2019-08-12 | Stop reason: HOSPADM

## 2019-08-04 RX ORDER — DIPHENHYDRAMINE HCL 25 MG
25 TABLET ORAL EVERY 8 HOURS PRN
Status: DISCONTINUED | OUTPATIENT
Start: 2019-08-04 | End: 2019-08-12 | Stop reason: HOSPADM

## 2019-08-04 RX ORDER — WARFARIN SODIUM 10 MG/1
10 TABLET ORAL
Status: DISCONTINUED | OUTPATIENT
Start: 2019-08-04 | End: 2019-08-08

## 2019-08-04 RX ORDER — TORSEMIDE 20 MG/1
20 TABLET ORAL DAILY
Status: DISCONTINUED | OUTPATIENT
Start: 2019-08-04 | End: 2019-08-05

## 2019-08-04 RX ADMIN — DIPHENHYDRAMINE HCL 25 MG: 25 TABLET ORAL at 23:04

## 2019-08-04 RX ADMIN — INSULIN GLARGINE 14 UNITS: 100 INJECTION, SOLUTION SUBCUTANEOUS at 22:00

## 2019-08-04 RX ADMIN — WARFARIN SODIUM 10 MG: 10 TABLET ORAL at 19:45

## 2019-08-04 RX ADMIN — DIPHENHYDRAMINE HCL 25 MG: 25 TABLET ORAL at 23:06

## 2019-08-04 RX ADMIN — INSULIN LISPRO 30 UNITS: 100 INJECTION, SOLUTION INTRAVENOUS; SUBCUTANEOUS at 12:24

## 2019-08-04 RX ADMIN — METOPROLOL TARTRATE 25 MG: 25 TABLET, FILM COATED ORAL at 22:00

## 2019-08-04 RX ADMIN — CLOTRIMAZOLE: 10 CREAM TOPICAL at 12:25

## 2019-08-04 RX ADMIN — INSULIN LISPRO 2 UNITS: 100 INJECTION, SOLUTION INTRAVENOUS; SUBCUTANEOUS at 09:18

## 2019-08-04 RX ADMIN — HEPARIN SODIUM 4000 UNITS: 1000 INJECTION, SOLUTION INTRAVENOUS; SUBCUTANEOUS at 23:04

## 2019-08-04 RX ADMIN — HEPARIN SODIUM 2000 UNITS: 1000 INJECTION, SOLUTION INTRAVENOUS; SUBCUTANEOUS at 14:49

## 2019-08-04 RX ADMIN — INSULIN LISPRO 30 UNITS: 100 INJECTION, SOLUTION INTRAVENOUS; SUBCUTANEOUS at 17:26

## 2019-08-04 RX ADMIN — HYDROCODONE BITARTRATE AND ACETAMINOPHEN 2 TABLET: 5; 325 TABLET ORAL at 18:44

## 2019-08-04 RX ADMIN — INSULIN LISPRO 30 UNITS: 100 INJECTION, SOLUTION INTRAVENOUS; SUBCUTANEOUS at 09:17

## 2019-08-04 RX ADMIN — ATORVASTATIN CALCIUM 20 MG: 20 TABLET, FILM COATED ORAL at 09:18

## 2019-08-04 RX ADMIN — HYDROCODONE BITARTRATE AND ACETAMINOPHEN 2 TABLET: 5; 325 TABLET ORAL at 23:06

## 2019-08-04 RX ADMIN — HYDROMORPHONE HYDROCHLORIDE 0.5 MG: 1 INJECTION, SOLUTION INTRAMUSCULAR; INTRAVENOUS; SUBCUTANEOUS at 22:02

## 2019-08-04 RX ADMIN — FLUTICASONE FUROATE AND VILANTEROL TRIFENATATE 1 PUFF: 100; 25 POWDER RESPIRATORY (INHALATION) at 09:18

## 2019-08-04 RX ADMIN — HEPARIN SODIUM 2000 UNITS: 1000 INJECTION, SOLUTION INTRAVENOUS; SUBCUTANEOUS at 07:24

## 2019-08-04 RX ADMIN — HYDROCODONE BITARTRATE AND ACETAMINOPHEN 2 TABLET: 5; 325 TABLET ORAL at 12:32

## 2019-08-04 RX ADMIN — CLOTRIMAZOLE: 10 CREAM TOPICAL at 17:26

## 2019-08-04 RX ADMIN — INSULIN LISPRO 6 UNITS: 100 INJECTION, SOLUTION INTRAVENOUS; SUBCUTANEOUS at 12:24

## 2019-08-04 RX ADMIN — ALLOPURINOL 300 MG: 300 TABLET ORAL at 09:17

## 2019-08-04 RX ADMIN — HEPARIN SODIUM AND DEXTROSE 17.1 UNITS/KG/HR: 10000; 5 INJECTION INTRAVENOUS at 17:25

## 2019-08-04 RX ADMIN — TORSEMIDE 20 MG: 20 TABLET ORAL at 09:18

## 2019-08-04 RX ADMIN — METOPROLOL TARTRATE 25 MG: 25 TABLET, FILM COATED ORAL at 09:18

## 2019-08-04 NOTE — PLAN OF CARE
Problem: PHYSICAL THERAPY ADULT  Goal: Performs mobility at highest level of function for planned discharge setting  See evaluation for individualized goals  Description  Treatment/Interventions: ADL retraining, Functional transfer training, LE strengthening/ROM, Therapeutic exercise, Endurance training, Patient/family training, Bed mobility, OT, Spoke to nursing  Equipment Recommended: Francisco Basket, Wheelchair       See flowsheet documentation for full assessment, interventions and recommendations  Outcome: Progressing  Note:   Prognosis: Good  Problem List: Decreased strength, Decreased endurance, Impaired balance, Decreased mobility, Impaired sensation, Obesity, Pain, Decreased skin integrity  Assessment: Patient received partially sitting edge of bed  No pain at rest  Pain goes to 10/10 with movement  RN aware  Patient performed bed mobility and transfers with max A x 2  Needed encouragement to participate  Repeated verbal cues for technique  Patient made progress this session with a stand pivot transfer into the chair  He will require rehab at discharge  Barriers to Discharge: Inaccessible home environment, Decreased caregiver support     Recommendation: Short-term skilled PT     PT - OK to Discharge: Yes    See flowsheet documentation for full assessment

## 2019-08-04 NOTE — PROGRESS NOTES
Progress Note - Lashanda Koenig 1950, 79 y o  male MRN: [id number]    Unit/Bed#: [address] [place] [address] Encounter: [id number]    Primary Care Provider: Renita Estrada MD   Date and time admitted to hospital: 7/25/2019  1:04 PM        Acute renal failure superimposed on stage 3 chronic kidney disease ([facility] )  Assessment & Plan  · Creatinine 1 86 today from baseline of 1 2-14  · Nephrology consult appreciated  · Ultrasound renals = No evidence of nephrolithiasis or hydronephrosis  Stable left renal cyst measuring 1 3 cm  Unremarkable bladder  · Bladder scan and PVRs  · Avoid hypotension/NSAIDs    Moderate persistent asthma without complication  Assessment & Plan  · cont albuterol/Advair    Acute on chronic diastolic congestive heart failure ([facility] )  Assessment & Plan  · Patient had volume overload post surgery  · Aggressive IV diuresis (Lasix 80 IV q12h)  as per Cardiology on 08/02/2019  · Patient is switched to torsemide this morning from Lasix  · Continue beta-blockers/Aldactone/atorvastatin  Weight (last 2 days)     Date/Time   Weight    08/04/19 0921   (!) 158 (349 21)    08/04/19 0548   (!) 164 (362)    08/03/19 1124   (!) 164 (361 55)    08/03/19 0553   (!) 176 (386 91)    08/02/19 0510   (!) 174 (382 94)              Intake/Output Summary (Last 24 hours) at 8/4/2019 1004  Last data filed at 8/4/2019 0701  Gross per 24 hour   Intake --   Output 1580 ml   Net -1580 ml       Morbid obesity ([facility] )  Assessment & Plan  · Encourage weight loss    Chronic atrial fibrillation ([facility] )  Assessment & Plan  · Patient has been on heparin drip and Coumadin on 08/01/2019 after discussion with surgery  Overlap until INR is therapeutic  · Continue Lopressor  · Cardiology on board    Essential hypertension  Assessment & Plan  · continue torsemide/ beta-blocker/Aldactone  · Cozaar on hold    Chronic venous stasis dermatitis of both lower extremities  Assessment & Plan  · Chronic, POA    · S/P left AKA  · podiatry/wound care     Gout  Assessment & Plan  · Continue allopurinol    * Type 2 diabetes mellitus with left diabetic foot ulcer [facility])  Assessment & Plan  Lab Results   Component Value Date    HGBA1C 9 4 (H) 12/27/2018       Recent Labs     08/03/19  1106 08/03/19  1635 08/03/19  2059 08/04/19  0726   POCGLU 271* 170* 195* 162*     · 07/31/2019: Left AKA  · Patient completed the course of IV Vanco/Cefepime as per ID  · Monitor WBC and fever curve  · Continue Novolog/Lantus/sliding scale coverage  · Podiatry, ID general surgery on board  · Continue with current pain regimen  Labs & Imaging: I have personally reviewed pertinent reports  VTE Pharmacologic Prophylaxis: Heparin and Warfarin (Coumadin)  VTE Mechanical Prophylaxis: sequential compression device    Code Status:   Level 1 - Full Code    Patient Centered Rounds: I have performed bedside rounds with nursing staff today  Discussions with Specialists or Other Care Team Provider:  Nephrology    Education and Discussions with Family / Patient:  Yes    Current Length of Stay: 10 day(s)    Current Patient Status: Inpatient   Certification Statement: The patient will continue to require additional inpatient hospital stay due to see my assessment and plan  Subjective:   Patient is seen and examined at bedside  Pain is well controlled  Complains of a rash on the right forearm  Also noticed 2 small rashes on abdomen  Denies any other complaint  Afebrile  All other ROS are negative  Objective:    Vitals: Blood pressure 131/60, pulse 69, temperature 98 2 °F (36 8 °C), temperature source Oral, resp  rate 18, height 6' 3" (1 905 m), weight (!) 158 kg (349 lb 3 3 oz), SpO2 93 %  ,Body mass index is 43 65 kg/m²    SPO2 RA Rest      ED to Hosp-Admission (Current) from 7/25/2019 in [address] [facility] Surg Unit   SpO2  93 %   SpO2 Activity  At Rest   O2 Device  None (Room air)   O2 Flow Rate  2 L/min        I&O:     Intake/Output Summary (Last 24 hours) at 8/4/2019 1005  Last data filed at 8/4/2019 0701  Gross per 24 hour   Intake --   Output 1580 ml   Net -1580 ml       Physical Exam:    General- Alert, lying comfortably in bed  Not in any acute distress  HEENT- JENNIE, EOM intact  Neck- Supple, No JVD  CVS- regular, S1 and S2 normal   Chest- Bilateral Air entry, No rhochi, crackles or wheezing present  Abdomen- soft, nontender, not distended, no guarding or rigidity, BS+  Extremities-  Trace RLL pedal edema, No calf tenderness  CNS-   Alert, awake and orientedx3  No focal deficits present      Invasive Devices     Peripherally Inserted Central Catheter Line            PICC Line 13/28/00 Right Basilic 5 days                      Social History  reviewed  Family History   Problem Relation Age of Onset    Other Mother         CARDIAC DISORDER     Diabetes Mother     Cancer Father     Other Family         CARDIAC DISORDER     Diabetes Family     Cancer Family     Mental illness Family     reviewed    Meds:  Current Facility-Administered Medications   Medication Dose Route Frequency Provider Last Rate Last Dose    acetaminophen (TYLENOL) tablet 650 mg  650 mg Oral Q6H PRN Suzette Astl-Raymond, PA-C        albuterol (PROVENTIL HFA,VENTOLIN HFA) inhaler 2 puff  2 puff Inhalation Q6H PRN Suzette Astl-Raymond, PA-C   2 puff at 07/31/19 0749    allopurinol (ZYLOPRIM) tablet 300 mg  300 mg Oral Daily Suzette Astl-Raymond, PA-C   300 mg at 08/04/19 0917    atorvastatin (LIPITOR) tablet 20 mg  20 mg Oral Daily Suzette Astl-Raymond, PA-C   20 mg at 08/04/19 0918    fluticasone-vilanterol (BREO ELLIPTA) 100-25 mcg/inh inhaler 1 puff  1 puff Inhalation Daily Suzette Astl-Raymond, PA-C   1 puff at 08/04/19 0918    heparin (porcine) 25,000 units in 250 mL infusion (premix)  3-20 Units/kg/hr (Order-Specific) Intravenous Titrated Danita NICOLA Andrew 13 6 mL/hr at 08/04/19 0658 15 1 Units/kg/hr at 08/04/19 0658    heparin (porcine) injection 2,000 Units  2,000 Units Intravenous PRN NICOLA Lafleur   2,000 Units at 08/04/19 0724    heparin (porcine) injection 4,000 Units  4,000 Units Intravenous PRN NICOLA Lafleur        hydrALAZINE (APRESOLINE) injection 10 mg  10 mg Intravenous Q6H PRN Yari Avendaño MD        HYDROcodone-acetaminophen (NORCO) 5-325 mg per tablet 2 tablet  2 tablet Oral Q4H PRN Beto Shetty PA-C   2 tablet at 08/03/19 2124    HYDROmorphone (DILAUDID) injection 0 5 mg  0 5 mg Intravenous Q2H PRN Keaton Mora PA-C   0 5 mg at 08/02/19 2114    insulin glargine (LANTUS) subcutaneous injection 14 Units 0 14 mL  14 Units Subcutaneous HS Suzette Sánchez PA-C   14 Units at 08/03/19 2124    insulin lispro (HumaLOG) 100 units/mL subcutaneous injection 2-12 Units  2-12 Units Subcutaneous TID AC Suzette Sánchez PA-C   2 Units at 08/04/19 0918    insulin lispro (HumaLOG) 100 units/mL subcutaneous injection 30 Units  30 Units Subcutaneous TID With Meals Suzette Sánchez PA-C   30 Units at 08/04/19 0917    metoprolol tartrate (LOPRESSOR) tablet 25 mg  25 mg Oral Q12H [facility] Suzette Sánchez PA-C   25 mg at 08/04/19 3968    torsemide (DEMADEX) tablet 20 mg  20 mg Oral Daily Ottoniel Maria MD   20 mg at 08/04/19 0824    warfarin (COUMADIN) tablet 10 mg  10 mg Oral Daily (warfarin) Yari Avendaño MD          Medications Prior to Admission   Medication    albuterol (PROVENTIL HFA,VENTOLIN HFA) 90 mcg/act inhaler    allopurinol (ZYLOPRIM) 300 mg tablet    atorvastatin (LIPITOR) 20 mg tablet    BD INSULIN SYRINGE U/F 31G X 5/16" 0 5 ML MISC    BD PEN NEEDLE HILARIO U/F 32G X 4 MM MISC    fluticasone-salmeterol (ADVAIR DISKUS, WIXELA INHUB) 250-50 mcg/dose inhaler    glucose blood (ONE TOUCH ULTRA TEST) test strip    insulin aspart (NOVOLOG FLEXPEN) 100 Units/mL injection pen    Insulin Pen Needle 31G X 5 MM MISC    losartan (COZAAR) 50 mg tablet    metFORMIN (GLUCOPHAGE) 1000 MG tablet    metoprolol tartrate (LOPRESSOR) 25 mg tablet    torsemide (DEMADEX) 20 mg tablet    warfarin (COUMADIN) 5 mg tablet       Labs:  Results from last 7 days   Lab Units 08/04/19  0546 08/03/19  0537 08/02/19  0623  08/01/19  0457  07/30/19  0520   WBC Thousand/uL 7 71 8 67 9 42   < > 9 82   < > 7 57   HEMOGLOBIN g/dL 8 3* 8 2* 8 6*   < > 9 2*   < > 9 7*   HEMATOCRIT % 27 9* 27 9* 28 9*   < > 31 5*   < > 32 8*   PLATELETS Thousands/uL 325 310 265   < > 261   < > 356   NEUTROS PCT %  --   --  66  --  73  --  63   LYMPHS PCT %  --   --  16  --  12*  --  21   LYMPHO PCT % 20 18  --   --   --    < >  --    MONOS PCT %  --   --  9  --  8  --  10   MONO PCT % 4 7  --   --   --    < >  --    EOS PCT % 5 2 3  --  3   < > 3    < > = values in this interval not displayed  Results from last 7 days   Lab Units 08/04/19  0546 08/03/19 2032 08/03/19  0537   POTASSIUM mmol/L 5 0 5 1 4 6   CHLORIDE mmol/L 103 101 103   CO2 mmol/L 26 27 25   BUN mg/dL 65* 63* 54*   CREATININE mg/dL 1 86* 2 14* 1 88*   CALCIUM mg/dL 8 4 8 4 8 7     Lab Results   Component Value Date    TROPONINI <0 02 03/23/2018    TROPONINI <0 02 10/08/2017    CKTOTAL 53 03/23/2018    CKTOTAL 55 10/08/2017     Results from last 7 days   Lab Units 08/04/19  0546 08/03/19  0537 08/02/19  0623   INR  1 16 1 20* 1 23*     Lab Results   Component Value Date    BLOODCX No Growth After 5 Days  07/25/2019    BLOODCX No Growth After 5 Days  07/25/2019    BLOODCX No Growth After 5 Days  12/25/2018    WOUNDCULT 4+ Growth of Proteus mirabilis (A) 07/25/2019    WOUNDCULT 4+ Growth of  07/25/2019    WOUNDCULT (A) 07/25/2019     4+ Growth of Methicillin Resistant Staphylococcus aureus    WOUNDCULT 1+ Growth of Proteus mirabilis (A) 07/25/2019    WOUNDCULT 4+ Growth of  07/25/2019         Imaging:  Results for orders placed during the hospital encounter of 03/23/18   XR chest 1 view portable    Narrative CHEST     INDICATION:   foot ulcer      COMPARISON:  October 8, 2017    EXAM PERFORMED/VIEWS:  XR CHEST PORTABLE  1 image    FINDINGS:    Heart enlarged  Pulmonary vessels unremarkable  Lungs and pleural spaces clear  Osseous structures appear within normal limits for patient age  Impression No acute abnormality in the chest         Workstation performed: [id number]       Results for orders placed in visit on 05/02/19   XR chest pa & lateral    Narrative CHEST     INDICATION:   J45 40: Moderate persistent asthma, uncomplicated  G16 62: Dyspnea, unspecified  COMPARISON:  One view chest 3/23/2018    EXAM PERFORMED/VIEWS:  XR CHEST PA & LATERAL      FINDINGS:    Cardiac silhouette is enlarged  Aortic calcification is present  Minimal bibasilar subsegmental atelectasis left greater than right  No pneumothorax, or pleural effusion  Mild distention of central pulmonary vessels  Multilevel thoracolumbar spondylosis  Impression Mild central pulmonary vascular congestion  Minimal bibasilar subsegmental atelectasis left greater than right          Workstation performed: [id number]         Last 24 Hours Medication List:     Current Facility-Administered Medications:  acetaminophen 650 mg Oral Q6H PRN Sandra Rack Astl-Raymond, PA-C    albuterol 2 puff Inhalation Q6H PRN Sandra Rack Astl-Raymond, PA-C    allopurinol 300 mg Oral Daily Suzette Astl-Raymond, PA-C    atorvastatin 20 mg Oral Daily Suzette Astl-Raymond, PA-C    fluticasone-vilanterol 1 puff Inhalation Daily Suzette Astl-Raymond, PA-C    heparin (porcine) 3-20 Units/kg/hr (Order-Specific) Intravenous Titrated Nayan Adnreas, CRNP Last Rate: 15 1 Units/kg/hr (08/04/19 0658)   heparin (porcine) 2,000 Units Intravenous PRN Nayan Andreas, CRNP    heparin (porcine) 4,000 Units Intravenous PRN Nayan Andreas, CRNP    hydrALAZINE 10 mg Intravenous Q6H PRN Randy Holly MD    HYDROcodone-acetaminophen 2 tablet Oral Q4H PRN Dalila Mora PA-C    HYDROmorphone 0 5 mg Intravenous Q2H PRN Brody Fontana PA-C insulin glargine 14 Units Subcutaneous HS LESLEY Cortés-C    insulin lispro 2-12 Units Subcutaneous TID AC Suzette Sánchez PA-C    insulin lispro 30 Units Subcutaneous TID With Meals Eliceo Crowley LESLEY Sánchez-C    metoprolol tartrate 25 mg Oral Q12H [address] Suzette Sánchez PA-C    torsemide 20 mg Oral Daily Armida Alcantar MD    warfarin 10 mg Oral Daily (warfarin) Matthew Carr MD         Today, Patient Was Seen By: Matthew Carr MD    ** Please Note: Dictation voice to text software may have been used in the creation of this document   **

## 2019-08-04 NOTE — PHYSICAL THERAPY NOTE
PHYSICAL THERAPY NOTE       08/04/19 1020   Pain Assessment   Pain Assessment 0-10   Pain Score Worst Possible Pain   Pain Type Surgical pain   Pain Location Leg   Pain Orientation Left   Restrictions/Precautions   Other Precautions Contact/isolation;Multiple lines;Telemetry;O2;Pain; Fall Risk   General   Chart Reviewed Yes   Response to Previous Treatment Patient with no complaints from previous session  Family/Caregiver Present No   Cognition   Overall Cognitive Status WFL   Arousal/Participation Alert   Attention Within functional limits   Orientation Level Oriented X4   Memory Within functional limits   Following Commands Follows one step commands without difficulty   Comments Needs encouragement   Subjective   Subjective "I don't want this pain "   Bed Mobility   Supine to Sit 2  Maximal assistance   Additional items Assist x 2;Bedrails; Increased time required;Verbal cues;LE management   Additional Comments Patient received partially sitting edge of bed  Transfers   Sit to Stand 2  Maximal assistance   Additional items Assist x 2;Armrests; Increased time required;Verbal cues   Stand to Sit 2  Maximal assistance   Additional items Assist x 2;Armrests; Increased time required;Verbal cues   Stand pivot 2  Maximal assistance   Additional items Assist x 2;Armrests; Increased time required;Verbal cues   Additional Comments Patient sat edge of bed approx 5 minutes with supervision  Performed 2 sit<>stand transfers  Needed encoruagement to get into chair  Repeated verbal cues for safety and technique  Use of RW for stand pivot transfer  Balance   Static Sitting Fair +   Dynamic Sitting Fair   Static Standing Poor +   Dynamic Standing Poor   Endurance Deficit   Endurance Deficit Yes   Endurance Deficit Description Easily fatigued and limited by pain   Activity Tolerance   Activity Tolerance Patient limited by pain; Patient limited by fatigue   Medical Staff Made Aware PCT's present for session   Nurse Made Aware ELEONORA RN made aware of pain level   Assessment   Prognosis Good   Problem List Decreased strength;Decreased endurance; Impaired balance;Decreased mobility; Impaired sensation;Obesity;Pain;Decreased skin integrity   Assessment Patient received partially sitting edge of bed  No pain at rest  Pain goes to 10/10 with movement  RN aware  Patient performed bed mobility and transfers with max A x 2  Needed encouragement to participate  Repeated verbal cues for technique  Patient made progress this session with a stand pivot transfer into the chair  He will require rehab at discharge  Barriers to Discharge Inaccessible home environment;Decreased caregiver support   Goals   Patient Goals To have less pain   STG Expiration Date 08/10/19   Plan   Treatment/Interventions Functional transfer training;LE strengthening/ROM; Therapeutic exercise; Endurance training;Equipment eval/education; Bed mobility;Spoke to nursing   Progress Slow progress, medical status limitations   PT Frequency 5x/wk   Recommendation   Recommendation Short-term skilled PT   Equipment Recommended Wheelchair;Walker   Ilene Bolaños, PT            Patient Name: Bibi Mason  WNXMS'P Date: 8/4/2019

## 2019-08-04 NOTE — ASSESSMENT & PLAN NOTE
· Patient had volume overload post surgery  · Daily weight and I&Os  · Patient was diuresed with the Lasix 80 mg IV q 12 hours and switched to torsemide 20 mg daily on 08/04  Will increase torsemide to 20 mg b i d   · Continue beta-blockers/Aldactone/atorvastatin      Weight (last 2 days)     Date/Time   Weight    08/04/19 0921   (!) 158 (349 21)    08/04/19 0548   (!) 164 (362)    08/03/19 1124   (!) 164 (361 55)    08/03/19 0553   (!) 176 (386 91)    08/02/19 0510   (!) 174 (382 94)              Intake/Output Summary (Last 24 hours) at 8/4/2019 1004  Last data filed at 8/4/2019 0701  Gross per 24 hour   Intake --   Output 1580 ml   Net -1580 ml

## 2019-08-04 NOTE — ASSESSMENT & PLAN NOTE
Lab Results   Component Value Date    HGBA1C 9 4 (H) 12/27/2018       Recent Labs     08/03/19  1106 08/03/19  1635 08/03/19  2059 08/04/19  0726   POCGLU 271* 170* 195* 162*     · 07/31/2019: Left AKA  · Patient has completed the course of IV Vanco/Cefepime as per ID  · Monitor WBC and fever curve  · Continue Novolog/Lantus/sliding scale coverage  · Podiatry, ID general surgery on board  · Continue with current pain regimen

## 2019-08-04 NOTE — ASSESSMENT & PLAN NOTE
· Creatinine 1 76 today from baseline of 1 2-14  · Nephrology follow-up  · Ultrasound renals = No evidence of nephrolithiasis or hydronephrosis  Stable left renal cyst measuring 1 3 cm  Unremarkable bladder    · Bladder scan and PVRs  · Avoid hypotension/NSAIDs

## 2019-08-04 NOTE — PLAN OF CARE
Problem: Potential for Falls  Goal: Patient will remain free of falls  Description  INTERVENTIONS:  - Assess patient frequently for physical needs  -  Identify cognitive and physical deficits and behaviors that affect risk of falls    -  [place] fall precautions as indicated by assessment   - Educate patient/family on patient safety including physical limitations  - Instruct patient to call for assistance with activity based on assessment  - Modify environment to reduce risk of injury  - Consider OT/PT consult to assist with strengthening/mobility  Outcome: Progressing     Problem: Prexisting or High Potential for Compromised Skin Integrity  Goal: Skin integrity is maintained or improved  Description  INTERVENTIONS:  - Identify patients at risk for skin breakdown  - Assess and monitor skin integrity  - Assess and monitor nutrition and hydration status  - Monitor labs (i e  albumin)  - Assess for incontinence   - Turn and reposition patient  - Assist with mobility/ambulation  - Relieve pressure over bony prominences  - Avoid friction and shearing  - Provide appropriate hygiene as needed including keeping skin clean and dry  - Evaluate need for skin moisturizer/barrier cream  - Collaborate with interdisciplinary team (i e  Nutrition, Rehabilitation, etc )   - Patient/family teaching  Outcome: Progressing     Problem: SKIN/TISSUE INTEGRITY - ADULT  Goal: Skin integrity remains intact  Description  INTERVENTIONS  - Identify patients at risk for skin breakdown  - Assess and monitor skin integrity  - Assess and monitor nutrition and hydration status  - Monitor labs (i e  albumin)  - Assess for incontinence   - Turn and reposition patient  - Assist with mobility/ambulation  - Relieve pressure over bony prominences  - Avoid friction and shearing  - Provide appropriate hygiene as needed including keeping skin clean and dry  - Evaluate need for skin moisturizer/barrier cream  - Collaborate with interdisciplinary team (i e  Nutrition, Rehabilitation, etc )   - Patient/family teaching  Outcome: Progressing  Goal: Incision(s), wounds(s) or drain site(s) healing without S/S of infection  Description  INTERVENTIONS  - Assess and document risk factors for skin impairment   - Assess and document dressing, incision, wound bed, drain sites and surrounding tissue  - Initiate Nutrition services consult and/or wound management as needed  Outcome: Progressing     Problem: MUSCULOSKELETAL - ADULT  Goal: Maintain or return mobility to safest level of function  Description  INTERVENTIONS:  - Assess patient's ability to carry out ADLs; assess patient's baseline for ADL function and identify physical deficits which impact ability to perform ADLs (bathing, care of mouth/teeth, toileting, grooming, dressing, etc )  - Assess/evaluate cause of self-care deficits   - Assess range of motion  - Assess patient's mobility; develop plan if impaired  - Assess patient's need for assistive devices and provide as appropriate  - Encourage maximum independence but intervene and supervise when necessary  - Involve family in performance of ADLs  - Assess for home care needs following discharge   - Request OT consult to assist with ADL evaluation and planning for discharge  - Provide patient education as appropriate  Outcome: Progressing     Problem: PAIN - ADULT  Goal: Verbalizes/displays adequate comfort level or baseline comfort level  Description  Interventions:  - Encourage patient to monitor pain and request assistance  - Assess pain using appropriate pain scale  - Administer analgesics based on type and severity of pain and evaluate response  - Implement non-pharmacological measures as appropriate and evaluate response  - Consider cultural and social influences on pain and pain management  - Notify physician/advanced practitioner if interventions unsuccessful or patient reports new pain  Outcome: Progressing     Problem: INFECTION - ADULT  Goal: Absence or prevention of progression during hospitalization  Description  INTERVENTIONS:  - Assess and monitor for signs and symptoms of infection  - Monitor lab/diagnostic results  - Monitor all insertion sites, i e  indwelling lines, tubes, and drains  - Monitor endotracheal (as able) and nasal secretions for changes in amount and color  - [place] appropriate cooling/warming therapies per order  - Administer medications as ordered  - Instruct and encourage patient and family to use good hand hygiene technique  - Identify and instruct in appropriate isolation precautions for identified infection/condition  Outcome: Progressing     Problem: SAFETY ADULT  Goal: Patient will remain free of falls  Description  INTERVENTIONS:  - Assess patient frequently for physical needs  -  Identify cognitive and physical deficits and behaviors that affect risk of falls    -  [place] fall precautions as indicated by assessment   - Educate patient/family on patient safety including physical limitations  - Instruct patient to call for assistance with activity based on assessment  - Modify environment to reduce risk of injury  - Consider OT/PT consult to assist with strengthening/mobility  Outcome: Progressing     Problem: DISCHARGE PLANNING  Goal: Discharge to home or other facility with appropriate resources  Description  INTERVENTIONS:  - Identify barriers to discharge w/patient and caregiver  - Arrange for needed discharge resources and transportation as appropriate  - Identify discharge learning needs (meds, wound care, etc )  - Arrange for interpretive services to assist at discharge as needed  - Refer to Case Management Department for coordinating discharge planning if the patient needs post-hospital services based on physician/advanced practitioner order or complex needs related to functional status, cognitive ability, or social support system  Outcome: Progressing     Problem: Knowledge Deficit  Goal: Patient/family/caregiver demonstrates understanding of disease process, treatment plan, medications, and discharge instructions  Description  Complete learning assessment and assess knowledge base  Interventions:  - Provide teaching at level of understanding  - Provide teaching via preferred learning methods  Outcome: Progressing     Problem: Nutrition/Hydration-ADULT  Goal: Nutrient/Hydration intake appropriate for improving, restoring or maintaining nutritional needs  Description  Monitor and assess patient's nutrition/hydration status for malnutrition (ex- brittle hair, bruises, dry skin, pale skin and conjunctiva, muscle wasting, smooth red tongue, and disorientation)  Collaborate with interdisciplinary team and initiate plan and interventions as ordered  Monitor patient's weight and dietary intake as ordered or per policy  Utilize nutrition screening tool and intervene per policy  Determine patient's food preferences and provide high-protein, high-caloric foods as appropriate       INTERVENTIONS:  - Monitor oral intake, urinary output, labs, and treatment plans  - Assess nutrition and hydration status and recommend course of action  - Evaluate amount of meals eaten  - Assist patient with eating if necessary   - Allow adequate time for meals  - Recommend/ encourage appropriate diets, oral nutritional supplements, and vitamin/mineral supplements  - Order, calculate, and assess calorie counts as needed  - Recommend, monitor, and adjust tube feedings and TPN/PPN based on assessed needs  - Assess need for intravenous fluids  - Provide specific nutrition/hydration education as appropriate  - Include patient/family/caregiver in decisions related to nutrition  Outcome: Progressing

## 2019-08-04 NOTE — CONSULTS
Consultation - Nephrology   Loyd Lopez 79 y o  male MRN: [id number]  Unit/Bed#: [address] [place] [address] Encounter: [id number]    Inpatient consult to Nephrology  Consult performed by: Gearld Boxer, MD  Consult ordered by: Kesha Isaacs MD          History of Present Illness   Physician Requesting Consult: Jenniffer Alfonso MD  Reason for Consult / Principal Problem:  DORA/CKD stage 3    HPI: Loyd Lopez is a 79y o  year old male with history of CHF/COPD/diabetes mellitus/hypertension who presents with a history of ongoing worsening left foot wound and pain  Patient is status post left AKA 07/31/2019 on antibiotics per Infectious Disease  Patient's course was complicated by volume overload postoperatively followed with Cardiology treated with intravenous furosemide 80 mg q 12 hours  We are asked to see the patient for CKD stage 3/AK I:  Baseline creatinine appears to range approximately 1 25-1 38  The patient had a peak creatinine of 2 14 yesterday which was a rise over the last several days associated with diuresis  His creatinine is down to 1 86 mg/dL today  He denies any kidney problems in the past  He denies any current dysuria hematuria voiding symptoms or foamy urine  He denies any kidney stones/bladder problems/prostate problems/NSAID use  He states his breathing is improved and his right lower extremity edema has also improved  Intake and output demonstrates -11 L but not very accurate; but he was negative -1 3 yesterday but no oral intake taken into account      History obtained from the patient, the chart and the old records which I completely reviewed        General:  Currently good appetite no fevers or chills at the moment  Cardiovascular:  No chest pain or shortness of Breath, leg swelling has improved on the right lower extremity  Respiratory:  No coughing or shortness of Breath at this time  Gastrointestinal:  No nausea vomiting or diarrhea  Genitourinary:  See HPI  Neuro:   No headaches  Rest of review of systems as completely reviewed with the patient are negative    Historical Information   Past Medical History:   Diagnosis Date    CHF (congestive heart failure) (HCC)     COPD (chronic obstructive pulmonary disease) (HCC)     Decubitus ulcer of heel     LAST ASSESSED [id number]    Diabetes mellitus ([facility] [address] )     History of varicose veins     Hypertension     Intermittent claudication (HCC)     Neuropathy     Seasonal allergies      Past Surgical History:   Procedure Laterality Date    AMPUTATION ABOVE KNEE (AKA) Left 7/31/2019    Procedure: AMPUTATION ABOVE KNEE (AKA);   Surgeon: Mohinder Valerio MD;  Location:  MAIN OR;  Service: General    ANGIOPLASTY      W/ FLUOROSC ANGIOGRAPGY PERIPHERAL ARTERY ADDITIONAL  LAST ASSESSED [id number]    ANGIOPLASTY / STENTING FEMORAL      TANSCATH INTRAVASCULAR STENT PLACEMENT PERCUTANEOUS FEMORAL     FULL THICKNESS SKIN GRAFT      TENDON REPAIR      TOE AMPUTATION Left 12/27/2018    Procedure: AMPUTATION left 4th TOE;  Surgeon: Esau Forbes DPM;  Location:  MAIN OR;  Service: Podiatry     Social History   Social History     Substance and Sexual Activity   Alcohol Use Not Currently     Social History     Substance and Sexual Activity   Drug Use Not Currently     Social History     Tobacco Use   Smoking Status Never Smoker   Smokeless Tobacco Never Used     Family History   Problem Relation Age of Onset    Other Mother         CARDIAC DISORDER     Diabetes Mother     Cancer Father     Other Family         CARDIAC DISORDER     Diabetes Family     Cancer Family     Mental illness Family        Meds/Allergies   all current active meds have been reviewed, current meds:   Current Facility-Administered Medications   Medication Dose Route Frequency    acetaminophen (TYLENOL) tablet 650 mg  650 mg Oral Q6H PRN    albuterol (PROVENTIL HFA,VENTOLIN HFA) inhaler 2 puff  2 puff Inhalation Q6H PRN    allopurinol (ZYLOPRIM) tablet 300 mg  300 mg Oral Daily    atorvastatin (LIPITOR) tablet 20 mg  20 mg Oral Daily    fluticasone-vilanterol (BREO ELLIPTA) 100-25 mcg/inh inhaler 1 puff  1 puff Inhalation Daily    furosemide (LASIX) injection 80 mg  80 mg Intravenous BID (diuretic)    heparin (porcine) 25,000 units in 250 mL infusion (premix)  3-20 Units/kg/hr (Order-Specific) Intravenous Titrated    heparin (porcine) injection 2,000 Units  2,000 Units Intravenous PRN    heparin (porcine) injection 4,000 Units  4,000 Units Intravenous PRN    hydrALAZINE (APRESOLINE) injection 10 mg  10 mg Intravenous Q6H PRN    HYDROcodone-acetaminophen (NORCO) 5-325 mg per tablet 2 tablet  2 tablet Oral Q4H PRN    HYDROmorphone (DILAUDID) injection 0 5 mg  0 5 mg Intravenous Q2H PRN    insulin glargine (LANTUS) subcutaneous injection 14 Units 0 14 mL  14 Units Subcutaneous HS    insulin lispro (HumaLOG) 100 units/mL subcutaneous injection 2-12 Units  2-12 Units Subcutaneous TID AC    insulin lispro (HumaLOG) 100 units/mL subcutaneous injection 30 Units  30 Units Subcutaneous TID With Meals    metoprolol tartrate (LOPRESSOR) tablet 25 mg  25 mg Oral Q12H JULIEN    warfarin (COUMADIN) tablet 5 mg  5 mg Oral Daily (warfarin)    and PTA meds:    Medications Prior to Admission   Medication    albuterol (PROVENTIL HFA,VENTOLIN HFA) 90 mcg/act inhaler    allopurinol (ZYLOPRIM) 300 mg tablet    atorvastatin (LIPITOR) 20 mg tablet    BD INSULIN SYRINGE U/F 31G X 5/16" 0 5 ML MISC    BD PEN NEEDLE HILARIO U/F 32G X 4 MM MISC    fluticasone-salmeterol (ADVAIR DISKUS, WIXELA INHUB) 250-50 mcg/dose inhaler    glucose blood (ONE TOUCH ULTRA TEST) test strip    insulin aspart (NOVOLOG FLEXPEN) 100 Units/mL injection pen    Insulin Pen Needle 31G X 5 MM MISC    losartan (COZAAR) 50 mg tablet    metFORMIN (GLUCOPHAGE) 1000 MG tablet    metoprolol tartrate (LOPRESSOR) 25 mg tablet    torsemide (DEMADEX) 20 mg tablet    warfarin (COUMADIN) 5 mg tablet Allergies   Allergen Reactions    Latex Rash       Objective     Intake/Output Summary (Last 24 hours) at 8/4/2019 0652  Last data filed at 8/4/2019 0548  Gross per 24 hour   Intake --   Output 1330 ml   Net -1330 ml     Patient Vitals for the past 24 hrs:   BP Temp Temp src Pulse Resp SpO2 Weight   08/04/19 0548 -- -- -- -- -- -- (!) 164 kg (361 lb 15 9 oz)   08/03/19 2352 123/63 97 8 °F (36 6 °C) Oral 68 18 93 % --   08/03/19 2124 144/73 -- -- 91 -- -- --   08/03/19 1500 133/68 98 4 °F (36 9 °C) Oral 79 18 91 % --   08/03/19 1124 -- -- -- -- -- -- (!) 164 kg (361 lb 8 9 oz)   08/03/19 0859 -- -- -- -- -- 94 % --   08/03/19 0826 161/78 98 2 °F (36 8 °C) Oral 86 17 90 % --       Invasive Devices:      Vitals:    08/03/19 2352   BP: 123/63   Pulse: 68   Resp: 18   Temp: 97 8 °F (36 6 °C)   SpO2: 93%     General:  Obese, well-developed no acute distress  Skin:  No acute rash  Eyes:  No scleral icterus and noninjected  ENT:  Normocephalic/atraumatic, mucous membranes moist  Neck:  Supple, no jugular venous distention, no carotid bruits, 1+ carotid upstroke, no thyromegaly  Back:  No CVA tenderness  Chest:  Clear to auscultation and percussion  CVS:  Regular rate and rhythm without evidence of a cardiac rub murmurs or gallops  Abdomen:  Obese, soft and nontender with normal bowel sounds  Extremities:  Left AKA, right lower extremity severe venous stasis changes with nonpitting edema, poor distal pulses and no cyanosis; no femoral bruits  Neuro:  No gross focality  Psych:  Alert and oriented and appropriate    Current Weight: Weight - Scale: (!) 164 kg (361 lb 15 9 oz)  First Weight: Weight - Scale: (!) 175 kg (385 lb 12 9 oz)    Lab Results:  I have personally reviewed pertinent labs    CBC:   Lab Results   Component Value Date    WBC 7 71 08/04/2019    WBC 5 16 12/23/2015    RBC 3 03 (L) 08/04/2019    RBC 3 79 (L) 12/23/2015     CMP:   Lab Results   Component Value Date     01/15/2018     08/04/2019 CL 98 01/23/2018    CO2 26 08/04/2019    CO2 29 01/23/2018    ANIONGAP 11 12/22/2015    BUN 65 (H) 08/04/2019    BUN 27 (H) 01/23/2018    CREATININE 1 86 (H) 08/04/2019    CREATININE 0 99 01/15/2018    GLUCOSE 139 12/22/2015    CALCIUM 8 4 08/04/2019    CALCIUM 8 9 01/23/2018    AST 19 07/25/2019    AST 13 01/15/2018    ALT 24 07/25/2019    ALT 13 01/15/2018    ALKPHOS 247 (H) 07/25/2019    ALKPHOS 199 (H) 01/23/2018    PROT 7 1 01/15/2018    BILITOT 0 6 01/15/2018    EGFR 37 08/04/2019     Phosphorus:   Lab Results   Component Value Date    PHOS 4 8 (H) 08/04/2019     Magnesium:   Lab Results   Component Value Date    MG 2 0 08/04/2019    MG 1 9 12/21/2015     Urinalysis:   Lab Results   Component Value Date    COLORU Yellow 03/23/2018    COLORU Yellow 04/02/2015    CLARITYU Clear 03/23/2018    CLARITYU Slightly Cloudy 04/02/2015    SPECGRAV 1 015 03/23/2018    SPECGRAV >=1 030 04/02/2015    PHUR 5 5 03/23/2018    PHUR 5 5 04/02/2015    LEUKOCYTESUR (A) 03/23/2018     Elevated glucose may cause decreased leukocyte values  See urine microscopic for [facility] result/    LEUKOCYTESUR Negative 04/02/2015    NITRITE Negative 03/23/2018    NITRITE Negative 04/02/2015    PROTEINUA Trace (A) 04/02/2015    GLUCOSEU >=1000 (1%) (A) 03/23/2018    GLUCOSEU Negative 04/02/2015    KETONESU Negative 03/23/2018    KETONESU Negative 04/02/2015    BILIRUBINUR Negative 03/23/2018    BILIRUBINUR Small (A) 04/02/2015    BLOODU Negative 03/23/2018    BLOODU Negative 04/02/2015     BMP:   Lab Results   Component Value Date    GLUCOSE 139 12/22/2015    SODIUM 139 08/04/2019    SODIUM 138 01/23/2018    CO2 26 08/04/2019    CO2 29 01/23/2018    BUN 65 (H) 08/04/2019    BUN 27 (H) 01/23/2018    CREATININE 1 86 (H) 08/04/2019    CREATININE 0 99 01/15/2018    CALCIUM 8 4 08/04/2019    CALCIUM 8 9 01/23/2018     Radiology review:   Ultrasound:  07/30/2019:  Negative hydronephrosis, stable left renal cyst          Assessment/Plan   1   CKD stage 3: Baseline 1 2-1 4 approximately  Differential diagnosis would include diabetic nephropathy/hypertensive nephrosclerosis/arteriolar nephrosclerosis  2  DORA:  Patient's peak creatinine was 2 14 now down to 1 86  Most compatible with prerenal with ongoing diuresis, no evidence of obstructive uropathy based on the ultrasound  Possible mild ATN related to the infection/vancomycin, less likely acute interstitial nephritis from antibiotics  (PATIENT HAD VANCOMYCIN TOXICITY ON 08/01/19 WITH A LEVEL OF 31 1) And of course possible cardiorenal syndrome given recent CHF  Patient's renal function has improved as outlined  Currently he does not demonstrate significant volume overload based on exam   Recommendations:  -check UA  -check urine sodium  -obtain chest x-ray today to re-evaluate for CHF  -check PVR with bladder scans  -switch to oral diuretics pending chest x-ray  -avoid nephrotoxic agents such as NSAIDs and contrast if possible  -avoid hypoperfusion in the setting of AK I  -monitor renal function, and intake and output  It is possible the patient's creatinine may need to be at a higher level given recent CHF  3  Hypertension:  Acceptable  Again avoid hypoperfusion  Hold parameters  4  Electrolytes:  -high normal potassium  Add a 2 g potassium diet  Possibly related to DORA/type 4 RTA  5  Mineral bone disorder:  Obtain follow-up magnesium and phosphorus  6  Anemia:  Certainly in part related to blood loss from procedure  May be from CKD and chronic disease  Recommendations: Workup:  -iron studies  -stool for occult blood  -rule out multiple myeloma given CKD and anemia including SPEP/UPEP and light chains  -monitor hemoglobin transfuse for hemoglobin less than 7 0 per primary service    7   Other issues:  -status post left AKA currently off antibiotics  -diabetes mellitus per primary service  -chronic venous stasis dermatitis with some possible lymphedema of right lower extremity  -chronic atrial fibrillation  -asthma  -gout        Portions of the record may have been created with voice recognition software   Occasional wrong word or "sound a like" substitutions may have occurred due to the inherent limitations of voice recognition software   Read the chart carefully and recognize, using context, where substitutions have occurred

## 2019-08-05 LAB
ANION GAP SERPL CALCULATED.3IONS-SCNC: 11 MMOL/L (ref 4–13)
APTT PPP: 60 SECONDS (ref 23–37)
APTT PPP: 75 SECONDS (ref 23–37)
BASOPHILS # BLD AUTO: 0.08 THOUSANDS/ΜL (ref 0–0.1)
BASOPHILS NFR BLD AUTO: 1 % (ref 0–1)
BUN SERPL-MCNC: 70 MG/DL (ref 5–25)
CALCIUM SERPL-MCNC: 8.6 MG/DL (ref 8.3–10.1)
CHLORIDE SERPL-SCNC: 104 MMOL/L (ref 100–108)
CO2 SERPL-SCNC: 24 MMOL/L (ref 21–32)
CREAT SERPL-MCNC: 1.76 MG/DL (ref 0.6–1.3)
EOSINOPHIL # BLD AUTO: 0.3 THOUSAND/ΜL (ref 0–0.61)
EOSINOPHIL NFR BLD AUTO: 5 % (ref 0–6)
ERYTHROCYTE [DISTWIDTH] IN BLOOD BY AUTOMATED COUNT: 17 % (ref 11.6–15.1)
FERRITIN SERPL-MCNC: 134 NG/ML (ref 8–388)
GFR SERPL CREATININE-BSD FRML MDRD: 39 ML/MIN/1.73SQ M
GLUCOSE SERPL-MCNC: 149 MG/DL (ref 65–140)
GLUCOSE SERPL-MCNC: 149 MG/DL (ref 65–140)
GLUCOSE SERPL-MCNC: 154 MG/DL (ref 65–140)
GLUCOSE SERPL-MCNC: 189 MG/DL (ref 65–140)
GLUCOSE SERPL-MCNC: 209 MG/DL (ref 65–140)
HCT VFR BLD AUTO: 28.8 % (ref 36.5–49.3)
HGB BLD-MCNC: 8.4 G/DL (ref 12–17)
IMM GRANULOCYTES # BLD AUTO: 0.34 THOUSAND/UL (ref 0–0.2)
IMM GRANULOCYTES NFR BLD AUTO: 5 % (ref 0–2)
INR PPP: 1.14 (ref 0.84–1.19)
IRON SATN MFR SERPL: 15 %
IRON SERPL-MCNC: 51 UG/DL (ref 65–175)
LYMPHOCYTES # BLD AUTO: 1.5 THOUSANDS/ΜL (ref 0.6–4.47)
LYMPHOCYTES NFR BLD AUTO: 23 % (ref 14–44)
MAGNESIUM SERPL-MCNC: 2.2 MG/DL (ref 1.6–2.6)
MCH RBC QN AUTO: 27.3 PG (ref 26.8–34.3)
MCHC RBC AUTO-ENTMCNC: 29.2 G/DL (ref 31.4–37.4)
MCV RBC AUTO: 94 FL (ref 82–98)
MONOCYTES # BLD AUTO: 0.56 THOUSAND/ΜL (ref 0.17–1.22)
MONOCYTES NFR BLD AUTO: 9 % (ref 4–12)
NEUTROPHILS # BLD AUTO: 3.73 THOUSANDS/ΜL (ref 1.85–7.62)
NEUTS SEG NFR BLD AUTO: 57 % (ref 43–75)
NRBC BLD AUTO-RTO: 1 /100 WBCS
PHOSPHATE SERPL-MCNC: 5.4 MG/DL (ref 2.3–4.1)
PLATELET # BLD AUTO: 332 THOUSANDS/UL (ref 149–390)
PMV BLD AUTO: 10 FL (ref 8.9–12.7)
POTASSIUM SERPL-SCNC: 4.9 MMOL/L (ref 3.5–5.3)
PROTHROMBIN TIME: 14.3 SECONDS (ref 11.6–14.5)
RBC # BLD AUTO: 3.08 MILLION/UL (ref 3.88–5.62)
SODIUM SERPL-SCNC: 139 MMOL/L (ref 136–145)
TIBC SERPL-MCNC: 348 UG/DL (ref 250–450)
WBC # BLD AUTO: 6.51 THOUSAND/UL (ref 4.31–10.16)

## 2019-08-05 PROCEDURE — 82948 REAGENT STRIP/BLOOD GLUCOSE: CPT

## 2019-08-05 PROCEDURE — 97530 THERAPEUTIC ACTIVITIES: CPT

## 2019-08-05 PROCEDURE — 94640 AIRWAY INHALATION TREATMENT: CPT

## 2019-08-05 PROCEDURE — 80048 BASIC METABOLIC PNL TOTAL CA: CPT | Performed by: INTERNAL MEDICINE

## 2019-08-05 PROCEDURE — 85610 PROTHROMBIN TIME: CPT | Performed by: INTERNAL MEDICINE

## 2019-08-05 PROCEDURE — 99232 SBSQ HOSP IP/OBS MODERATE 35: CPT | Performed by: INTERNAL MEDICINE

## 2019-08-05 PROCEDURE — 83550 IRON BINDING TEST: CPT | Performed by: INTERNAL MEDICINE

## 2019-08-05 PROCEDURE — 82728 ASSAY OF FERRITIN: CPT | Performed by: INTERNAL MEDICINE

## 2019-08-05 PROCEDURE — 85730 THROMBOPLASTIN TIME PARTIAL: CPT | Performed by: INTERNAL MEDICINE

## 2019-08-05 PROCEDURE — 85025 COMPLETE CBC W/AUTO DIFF WBC: CPT | Performed by: INTERNAL MEDICINE

## 2019-08-05 PROCEDURE — 86334 IMMUNOFIX E-PHORESIS SERUM: CPT | Performed by: INTERNAL MEDICINE

## 2019-08-05 PROCEDURE — 85730 THROMBOPLASTIN TIME PARTIAL: CPT | Performed by: NURSE PRACTITIONER

## 2019-08-05 PROCEDURE — 84100 ASSAY OF PHOSPHORUS: CPT | Performed by: INTERNAL MEDICINE

## 2019-08-05 PROCEDURE — 83883 ASSAY NEPHELOMETRY NOT SPEC: CPT | Performed by: INTERNAL MEDICINE

## 2019-08-05 PROCEDURE — 83540 ASSAY OF IRON: CPT | Performed by: INTERNAL MEDICINE

## 2019-08-05 PROCEDURE — 83735 ASSAY OF MAGNESIUM: CPT | Performed by: INTERNAL MEDICINE

## 2019-08-05 PROCEDURE — 99024 POSTOP FOLLOW-UP VISIT: CPT | Performed by: PHYSICIAN ASSISTANT

## 2019-08-05 PROCEDURE — 94760 N-INVAS EAR/PLS OXIMETRY 1: CPT

## 2019-08-05 RX ORDER — TORSEMIDE 20 MG/1
20 TABLET ORAL
Status: DISCONTINUED | OUTPATIENT
Start: 2019-08-05 | End: 2019-08-11

## 2019-08-05 RX ADMIN — INSULIN LISPRO 30 UNITS: 100 INJECTION, SOLUTION INTRAVENOUS; SUBCUTANEOUS at 08:37

## 2019-08-05 RX ADMIN — INSULIN LISPRO 30 UNITS: 100 INJECTION, SOLUTION INTRAVENOUS; SUBCUTANEOUS at 12:10

## 2019-08-05 RX ADMIN — TORSEMIDE 20 MG: 20 TABLET ORAL at 16:50

## 2019-08-05 RX ADMIN — METOPROLOL TARTRATE 25 MG: 25 TABLET, FILM COATED ORAL at 08:37

## 2019-08-05 RX ADMIN — HEPARIN SODIUM AND DEXTROSE 21 UNITS/KG/HR: 10000; 5 INJECTION INTRAVENOUS at 07:16

## 2019-08-05 RX ADMIN — HYDROCODONE BITARTRATE AND ACETAMINOPHEN 2 TABLET: 5; 325 TABLET ORAL at 23:21

## 2019-08-05 RX ADMIN — INSULIN LISPRO 2 UNITS: 100 INJECTION, SOLUTION INTRAVENOUS; SUBCUTANEOUS at 08:36

## 2019-08-05 RX ADMIN — HYDROMORPHONE HYDROCHLORIDE 0.5 MG: 1 INJECTION, SOLUTION INTRAMUSCULAR; INTRAVENOUS; SUBCUTANEOUS at 21:12

## 2019-08-05 RX ADMIN — HEPARIN SODIUM AND DEXTROSE 21 UNITS/KG/HR: 10000; 5 INJECTION INTRAVENOUS at 21:23

## 2019-08-05 RX ADMIN — INSULIN LISPRO 4 UNITS: 100 INJECTION, SOLUTION INTRAVENOUS; SUBCUTANEOUS at 12:09

## 2019-08-05 RX ADMIN — INSULIN LISPRO 30 UNITS: 100 INJECTION, SOLUTION INTRAVENOUS; SUBCUTANEOUS at 16:50

## 2019-08-05 RX ADMIN — WARFARIN SODIUM 10 MG: 10 TABLET ORAL at 17:00

## 2019-08-05 RX ADMIN — ATORVASTATIN CALCIUM 20 MG: 20 TABLET, FILM COATED ORAL at 08:36

## 2019-08-05 RX ADMIN — CALCIUM ACETATE 667 MG: 667 CAPSULE ORAL at 12:10

## 2019-08-05 RX ADMIN — CLOTRIMAZOLE: 10 CREAM TOPICAL at 17:00

## 2019-08-05 RX ADMIN — CALCIUM ACETATE 667 MG: 667 CAPSULE ORAL at 16:50

## 2019-08-05 RX ADMIN — CALCIUM ACETATE 667 MG: 667 CAPSULE ORAL at 08:40

## 2019-08-05 RX ADMIN — DIPHENHYDRAMINE HCL 25 MG: 25 TABLET ORAL at 07:16

## 2019-08-05 RX ADMIN — CLOTRIMAZOLE: 10 CREAM TOPICAL at 08:39

## 2019-08-05 RX ADMIN — ALLOPURINOL 300 MG: 300 TABLET ORAL at 08:36

## 2019-08-05 RX ADMIN — METOPROLOL TARTRATE 25 MG: 25 TABLET, FILM COATED ORAL at 21:19

## 2019-08-05 RX ADMIN — INSULIN GLARGINE 14 UNITS: 100 INJECTION, SOLUTION SUBCUTANEOUS at 21:19

## 2019-08-05 RX ADMIN — FLUTICASONE FUROATE AND VILANTEROL TRIFENATATE 1 PUFF: 100; 25 POWDER RESPIRATORY (INHALATION) at 09:46

## 2019-08-05 RX ADMIN — TORSEMIDE 20 MG: 20 TABLET ORAL at 08:38

## 2019-08-05 NOTE — PROGRESS NOTES
Progress Note - Igor Cuellar 1950, 79 y o  male MRN: [id number]    Unit/Bed#: [address] [place] [address] Encounter: [id number]    Primary Care Provider: Yari Chino MD   Date and time admitted to hospital: 7/25/2019  1:04 PM        Acute renal failure superimposed on stage 3 chronic kidney disease ([facility] [address] )  Assessment & Plan  · Creatinine 1 76 today from baseline of 1 2-14  · Nephrology follow-up  · Ultrasound renals = No evidence of nephrolithiasis or hydronephrosis  Stable left renal cyst measuring 1 3 cm  Unremarkable bladder  · Bladder scan and PVRs  · Avoid hypotension/NSAIDs    Moderate persistent asthma without complication  Assessment & Plan  · cont albuterol/Advair    Acute on chronic diastolic congestive heart failure ([facility] )  Assessment & Plan  · Patient had volume overload post surgery  · Daily weight and I&Os  · Patient was diuresed with the Lasix 80 mg IV q 12 hours and switched to torsemide 20 mg daily on 08/04  Will increase torsemide to 20 mg b i d   · Continue beta-blockers/Aldactone/atorvastatin  Weight (last 2 days)     Date/Time   Weight    08/04/19 0921   (!) 158 (349 21)    08/04/19 0548   (!) 164 (362)    08/03/19 1124   (!) 164 (361 55)    08/03/19 0553   (!) 176 (386 91)    08/02/19 0510   (!) 174 (382 94)              Intake/Output Summary (Last 24 hours) at 8/4/2019 1004  Last data filed at 8/4/2019 0701  Gross per 24 hour   Intake --   Output 1580 ml   Net -1580 ml       Morbid obesity ([facility] [address] )  Assessment & Plan  · Encourage weight loss    Chronic [facility] fibrillation ([facility] )  Assessment & Plan  · Patient has been on heparin drip and Coumadin on 08/01/2019 after discussion with surgery  Overlap until INR is therapeutic  · Continue Lopressor  · Cardiology on board    Essential hypertension  Assessment & Plan  · continue torsemide/ beta-blocker/Aldactone  · Cozaar on hold    Chronic venous stasis dermatitis of both lower extremities  Assessment & Plan  · Chronic, POA    · S/P left AKA  · podiatry/wound care  Gout  Assessment & Plan  · Continue allopurinol    * Type 2 diabetes mellitus with left diabetic foot ulcer [facility])  Assessment & Plan  Lab Results   Component Value Date    HGBA1C 9 4 (H) 12/27/2018       Recent Labs     08/03/19  1106 08/03/19  1635 08/03/19  2059 08/04/19  0726   POCGLU 271* 170* 195* 162*     · 07/31/2019: Left AKA  · Patient has completed the course of IV Vanco/Cefepime as per ID  · Monitor WBC and fever curve  · Continue Novolog/Lantus/sliding scale coverage  · Podiatry, ID general surgery on board  · Continue with current pain regimen  Labs & Imaging: I have personally reviewed pertinent reports  VTE Pharmacologic Prophylaxis: Heparin and Warfarin (Coumadin)  VTE Mechanical Prophylaxis: sequential compression device    Code Status:   Level 1 - Full Code    Patient Centered Rounds: I have performed bedside rounds with nursing staff today  Discussions with Specialists or Other Care Team Provider:  Cardiology    Education and Discussions with Family / Patient:  Yes    Current Length of Stay: 11 day(s)    Current Patient Status: Inpatient   Certification Statement: The patient will continue to require additional inpatient hospital stay due to see my assessment and plan  Subjective:   Patient is seen and examined at bedside  Denies any new complaints  Feels better  Rash is improving  Afebrile  All other ROS are negative  Objective:    Vitals: Blood pressure 160/81, pulse 78, temperature 98 1 °F (36 7 °C), temperature source Oral, resp  rate 18, height 6' 3" (1 905 m), weight (!) 172 kg (380 lb 1 2 oz), SpO2 92 %  ,Body mass index is 47 51 kg/m²    SPO2 RA Rest      ED to Hosp-Admission (Current) from 7/25/2019 in [facility] Surg Unit   SpO2  92 %   SpO2 Activity  At Rest   O2 Device  None (Room air)   O2 Flow Rate  2 L/min        I&O:     Intake/Output Summary (Last 24 hours) at 8/5/2019 0816  Last data filed at 8/5/2019 0701  Gross per 24 hour   Intake 240 ml   Output 2100 ml   Net -1860 ml       Physical Exam:    General- Alert, sitting comfortably in bed  Not in any acute distress  HEENT- JENNIE, EOM intact  Neck- Supple, No JVD  CVS- regular, S1 and S2 normal   Chest- Bilateral Air entry, No rhochi, crackles or wheezing present  Abdomen- soft, nontender,3 small plan she erythematous rash on lower abdomen  Extremities-  trace pedal edema, No calf tenderness  Left AKA  Dressing C/D/I  CNS-   Alert, awake and orientedx3  No focal deficits present      Invasive Devices     Peripherally Inserted Central Catheter Line            PICC Line 09/14/26 Right Basilic 6 days                      Social History  reviewed  Family History   Problem Relation Age of Onset    Other Mother         CARDIAC DISORDER     Diabetes Mother     Cancer Father     Other Family         CARDIAC DISORDER     Diabetes Family     Cancer Family     Mental illness Family     reviewed    Meds:  Current Facility-Administered Medications   Medication Dose Route Frequency Provider Last Rate Last Dose    acetaminophen (TYLENOL) tablet 650 mg  650 mg Oral Q6H PRN Suzette Sánchez PA-C        albuterol (PROVENTIL HFA,VENTOLIN HFA) inhaler 2 puff  2 puff Inhalation Q6H PRN LESLEY Cortés-SHANEKA   2 puff at 07/31/19 0749    allopurinol (ZYLOPRIM) tablet 300 mg  300 mg Oral Daily LESLEY Cortés-C   300 mg at 08/04/19 5661    atorvastatin (LIPITOR) tablet 20 mg  20 mg Oral Daily Suzette Sánchez PA-C   20 mg at 08/04/19 6896    calcium acetate (PHOSLO) capsule 667 mg  667 mg Oral TID With Meals Kriss Null MD        clotrimazole (LOTRIMIN) 1 % cream   Topical BID Kriss Null MD        diphenhydrAMINE (BENADRYL) tablet 25 mg  25 mg Oral Q8H PRN Kriss Null MD   25 mg at 08/05/19 0716    fluticasone-vilanterol (BREO ELLIPTA) 100-25 mcg/inh inhaler 1 puff  1 puff Inhalation Daily Suzette Sánchez PA-C   1 puff at 08/04/19 0918    heparin (porcine) 25,000 units in 250 mL infusion (premix)  3-20 Units/kg/hr (Order-Specific) Intravenous Titrated Nayan Andreas, CRNP 18 9 mL/hr at 08/05/19 0716 21 Units/kg/hr at 08/05/19 0716    heparin (porcine) injection 2,000 Units  2,000 Units Intravenous PRN Nayan Andreas, CRNP   2,000 Units at 08/04/19 1449    heparin (porcine) injection 4,000 Units  4,000 Units Intravenous PRN Nayan Andreas, CRNP   4,000 Units at 08/04/19 2304    hydrALAZINE (APRESOLINE) injection 10 mg  10 mg Intravenous Q6H PRN Randy Holly MD        HYDROcodone-acetaminophen (NORCO) 5-325 mg per tablet 2 tablet  2 tablet Oral Q4H PRN Brody Fontana PA-C   2 tablet at 08/04/19 2306    HYDROmorphone (DILAUDID) injection 0 5 mg  0 5 mg Intravenous Q2H PRN Dalila Mora PA-C   0 5 mg at 08/04/19 2202    insulin glargine (LANTUS) subcutaneous injection 14 Units 0 14 mL  14 Units Subcutaneous HS Suzette Sánchez PA-C   14 Units at 08/04/19 2200    insulin lispro (HumaLOG) 100 units/mL subcutaneous injection 2-12 Units  2-12 Units Subcutaneous TID AC Suzette Sánchez PAFAYE   6 Units at 08/04/19 1224    insulin lispro (HumaLOG) 100 units/mL subcutaneous injection 30 Units  30 Units Subcutaneous TID With Meals Suzette Sánchez PA-C   30 Units at 08/04/19 1726    metoprolol tartrate (LOPRESSOR) tablet 25 mg  25 mg Oral Q12H [address] Suzette Sánchez PA-C   25 mg at 08/04/19 2200    torsemide (DEMADEX) tablet 20 mg  20 mg Oral BID (diuretic) Randy Holly MD        warfarin (COUMADIN) tablet 10 mg  10 mg Oral Daily (warfarin) Randy Holly MD   10 mg at 08/04/19 1945      Medications Prior to Admission   Medication    albuterol (PROVENTIL HFA,VENTOLIN HFA) 90 mcg/act inhaler    allopurinol (ZYLOPRIM) 300 mg tablet    atorvastatin (LIPITOR) 20 mg tablet    BD INSULIN SYRINGE U/F 31G X 5/16" 0 5 ML MISC    BD PEN NEEDLE HILARIO U/F 32G X 4 MM MISC    fluticasone-salmeterol (ADVAIR DISKUS, WIXELA INHUB) 250-50 mcg/dose inhaler    glucose blood (ONE TOUCH ULTRA TEST) test strip    insulin aspart (NOVOLOG FLEXPEN) 100 Units/mL injection pen    Insulin Pen Needle 31G X 5 MM MISC    losartan (COZAAR) 50 mg tablet    metFORMIN (GLUCOPHAGE) 1000 MG tablet    metoprolol tartrate (LOPRESSOR) 25 mg tablet    torsemide (DEMADEX) 20 mg tablet    warfarin (COUMADIN) 5 mg tablet       Labs:  Results from last 7 days   Lab Units 08/05/19  0541 08/04/19  0546 08/03/19  0537 08/02/19  0623  08/01/19  0457   WBC Thousand/uL 6 51 7 71 8 67 9 42   < > 9 82   HEMOGLOBIN g/dL 8 4* 8 3* 8 2* 8 6*   < > 9 2*   HEMATOCRIT % 28 8* 27 9* 27 9* 28 9*   < > 31 5*   PLATELETS Thousands/uL 332 325 310 265   < > 261   NEUTROS PCT % 57  --   --  66  --  73   LYMPHS PCT % 23  --   --  16  --  12*   LYMPHO PCT %  --  20 18  --   --   --    MONOS PCT % 9  --   --  9  --  8   MONO PCT %  --  4 7  --   --   --    EOS PCT % 5 5 2 3  --  3    < > = values in this interval not displayed  Results from last 7 days   Lab Units 08/05/19  0541 08/04/19  0546 08/03/19  2032   POTASSIUM mmol/L 4 9 5 0 5 1   CHLORIDE mmol/L 104 103 101   CO2 mmol/L 24 26 27   BUN mg/dL 70* 65* 63*   CREATININE mg/dL 1 76* 1 86* 2 14*   CALCIUM mg/dL 8 6 8 4 8 4     Lab Results   Component Value Date    TROPONINI <0 02 03/23/2018    TROPONINI <0 02 10/08/2017    CKTOTAL 53 03/23/2018    CKTOTAL 55 10/08/2017     Results from last 7 days   Lab Units 08/05/19  0541 08/04/19  0546 08/03/19  0537   INR  1 14 1 16 1 20*     Lab Results   Component Value Date    BLOODCX No Growth After 5 Days  07/25/2019    BLOODCX No Growth After 5 Days  07/25/2019    BLOODCX No Growth After 5 Days   12/25/2018    WOUNDCULT 4+ Growth of Proteus mirabilis (A) 07/25/2019    WOUNDCULT 4+ Growth of  07/25/2019    WOUNDCULT (A) 07/25/2019     4+ Growth of Methicillin Resistant Staphylococcus aureus    WOUNDCULT 1+ Growth of Proteus mirabilis (A) 07/25/2019    WOUNDCULT 4+ Growth of  07/25/2019         Imaging:  Results for orders placed during the hospital encounter of 07/25/19   XR chest portable    Narrative CHEST     INDICATION:   chf     COMPARISON:  Chest radiograph 7/29/2019    EXAM PERFORMED/VIEWS:  XR CHEST PORTABLE      FINDINGS:  Right-sided PICC line with tip in the region of the cavoatrial junction  Heart shadow is enlarged but unchanged from prior exam     There is pulmonary vascular congestion  Streaky bibasilar opacities  No large effusion or pneumothorax  Osseous structures appear within normal limits for patient age  Impression 1  Stable enlargement of the cardiac silhouette and pulmonary vascular congestion  2   Bibasilar linear opacities compatible with atelectasis or scarring  Workstation performed: [id number]       Results for orders placed in visit on 05/02/19   XR chest pa & lateral    Narrative CHEST     INDICATION:   J45 40: Moderate persistent asthma, uncomplicated  O39 16: Dyspnea, unspecified  COMPARISON:  One view chest 3/23/2018    EXAM PERFORMED/VIEWS:  XR CHEST PA & LATERAL      FINDINGS:    Cardiac silhouette is enlarged  Aortic calcification is present  Minimal bibasilar subsegmental atelectasis left greater than right  No pneumothorax, or pleural effusion  Mild distention of central pulmonary vessels  Multilevel thoracolumbar spondylosis  Impression Mild central pulmonary vascular congestion  Minimal bibasilar subsegmental atelectasis left greater than right          Workstation performed: [id number]         Last 24 Hours Medication List:     Current Facility-Administered Medications:  acetaminophen 650 mg Oral Q6H PRN [place] Astl-Reimer, PA-C    albuterol 2 puff Inhalation Q6H PRN [place] Astl-Reimer, PA-C    allopurinol 300 mg Oral Daily Suzette Arauz-LESLEY Andrade-SHANEKA    atorvastatin 20 mg Oral Daily Suzette Arauz-JOEY Andrade    calcium acetate 667 mg Oral TID With Meals Zohaib S Shana Bell MD    clotrimazole  Topical BID Horacio Garcia MD    diphenhydrAMINE 25 mg Oral Q8H PRN Horacio Garcia MD    fluticasone-vilanterol 1 puff Inhalation Daily Suzette Sánchez PA-C    heparin (porcine) 3-20 Units/kg/hr (Order-Specific) Intravenous Titrated NICOLA Verma Last Rate: 21 Units/kg/hr (08/05/19 0716)   heparin (porcine) 2,000 Units Intravenous PRN NICOLA Verma    heparin (porcine) 4,000 Units Intravenous PRN NICOLA Verma    hydrALAZINE 10 mg Intravenous Q6H PRN Horacio Garcia MD    HYDROcodone-acetaminophen 2 tablet Oral Q4H PRN Dalila Mora PA-C    HYDROmorphone 0 5 mg Intravenous Q2H PRN Dalila Mora PA-C    insulin glargine 14 Units Subcutaneous HS LESLEY Cortés-C    insulin lispro 2-12 Units Subcutaneous TID AC LESLEY Cortés-C    insulin lispro 30 Units Subcutaneous TID With Meals Quinn LESLEY Bonilla-SHANEKA    metoprolol tartrate 25 mg Oral Q12H [address] LESLEY Cortés-SHANEKA    torsemide 20 mg Oral BID (diuretic) Horacio Garcia MD    warfarin 10 mg Oral Daily (warfarin) Horacio Garcia MD         Today, Patient Was Seen By: Horacio Garcia MD    ** Please Note: Dictation voice to text software may have been used in the creation of this document   **

## 2019-08-05 NOTE — PLAN OF CARE
Problem: PHYSICAL THERAPY ADULT  Goal: Performs mobility at highest level of function for planned discharge setting  See evaluation for individualized goals  Description  Treatment/Interventions: ADL retraining, Functional transfer training, LE strengthening/ROM, Therapeutic exercise, Endurance training, Patient/family training, Bed mobility, OT, Spoke to nursing  Equipment Recommended: Osmin Mu, Wheelchair       See flowsheet documentation for full assessment, interventions and recommendations  Outcome: Progressing  Note:   Prognosis: Good  Problem List: Decreased strength, Decreased range of motion, Decreased endurance, Impaired balance, Decreased mobility, Obesity, Decreased skin integrity, Pain  Assessment: Patient was received partially sitting edge of bed  Required encouragement to participate  Sat edge of bed approx 10 minutes with supervision during room setup  Education provided on important of OOB activity, skin protection, position changes, and there-ex  Patient performed transfers with max A x 2  Verbal and tactile cues throughout for safety and proper use of RW  Patient with increased pain with mobility  Patient is making progress and will benefit from rehab at discharge  Barriers to Discharge: Inaccessible home environment, Decreased caregiver support     Recommendation: Short-term skilled PT     PT - OK to Discharge: Yes    See flowsheet documentation for full assessment

## 2019-08-05 NOTE — PROGRESS NOTES
Progress Note - General Surgery   Terry Yeboah 79 y o  male MRN: [id number]  Unit/Bed#: [address] [place] [address] [id number] Encounter: [id number]    Non-healing diabetic foot wound left with charcot deformity, cellulitis  POD#5 s/p left Above the knee amputation  Complicated by uncontrolled diabetes, diabetic neuropathy, morbid obesity, limited mobility   -dressing removed, wound intact, staples in place, still with some edema, small area of ecchymosis  -continue to monitor the wound, dressing changes with compression until edema improved  -continue pain control as needed  -patient off antibiotics  -hemoglobin stable postop  -continue PT/OT  -discharge planning for STR, okay for discharge  from surgical standpoint, discharge per Primary Service when medically stable -follow-up in office in 2 weeks     Chronic lower leg lymphedema with statis dermatitis of bilateral lower extremities  -Diuretics, elevation, monitor     Diabetes type 2 with long term insulin, uncontrolled  Last A1C 9 4  Continue BGM, SSI coverage  Continue education on tight control for wound healing     Chronic atrial fibrillation on coumadin  Currently on heparin gtt  Coumadin restarted  Monitor INR     Acute on chronic kidney failure  Patient with bump in creatinine over the weekend, now trending downward  Monitor  IVF  Nephrology on board     Multiple medical comorbidities  Morbid obesity  CHF  COPD  HTN       Subjective/Objective   Chief Complaint:   Pain    Subjective:   Patient still with pain at left AKA site but much improved  Patient is working with physical therapy  Out of bed to chair  Renal function improving  Awaiting discharge to short-term rehab facility planned for this week    Objective:     Blood pressure (!) 175/77, pulse 70, temperature 97 8 °F (36 6 °C), temperature source Oral, resp  rate 18, height 6' 3" (1 905 m), weight (!) 172 kg (380 lb 1 2 oz), SpO2 93 %  ,Body mass index is 47 51 kg/m²        Intake/Output Summary (Last 24 hours) at 8/5/2019 1240  Last data filed at 8/5/2019 1234  Gross per 24 hour   Intake 240 ml   Output 2450 ml   Net -2210 ml       Invasive Devices     Peripherally Inserted Central Catheter Line            PICC Line 68/73/60 Right Basilic 6 days                Physical Exam:   General appearance: Morbidly obese, alert and oriented, in no acute distress  Head: Normocephalic, without obvious abnormality, atraumatic, sclerae anicteric, mucous membranes moist  Neck: no JVD and supple, symmetrical, trachea midline  Lungs: clear to auscultation, no wheezes or rales  Heart:       Regular, rate controlled  Abdomen: Morbidly obese, soft, nontender, active bowel sounds  Extremities:  Left AKA still with some edema, no significant drainage, staples intact, mild ecchymosis, N-V intact  Skin: Warm, dry; mild petechial rash on arms and mid abdomen  Nursing notes and vital signs reviewed    Lab, Imaging and other studies:  I have personally reviewed pertinent lab results    , CBC:   Lab Results   Component Value Date    WBC 6 51 08/05/2019    HGB 8 4 (L) 08/05/2019    HCT 28 8 (L) 08/05/2019    MCV 94 08/05/2019     08/05/2019    MCH 27 3 08/05/2019    MCHC 29 2 (L) 08/05/2019    RDW 17 0 (H) 08/05/2019    MPV 10 0 08/05/2019    NRBC 1 08/05/2019   , CMP:   Lab Results   Component Value Date    SODIUM 139 08/05/2019    K 4 9 08/05/2019     08/05/2019    CO2 24 08/05/2019    BUN 70 (H) 08/05/2019    CREATININE 1 76 (H) 08/05/2019    CALCIUM 8 6 08/05/2019    EGFR 39 08/05/2019   , Coagulation:   Lab Results   Component Value Date    INR 1 14 08/05/2019     VTE Pharmacologic Prophylaxis: Heparin, Coumadin  VTE Mechanical Prophylaxis: none secondary to AKA, PAD    Alberto Parent JOEY Sánchez

## 2019-08-05 NOTE — PHYSICAL THERAPY NOTE
PHYSICAL THERAPY NOTE       08/05/19 6415   Pain Assessment   Pain Assessment 0-10   Pain Score 8   Pain Type Surgical pain   Pain Location Leg   Pain Orientation Left   Restrictions/Precautions   Other Precautions Pain; Fall Risk;Multiple lines;Contact/isolation   General   Chart Reviewed Yes   Response to Previous Treatment Patient with no complaints from previous session  Family/Caregiver Present Yes   Cognition   Overall Cognitive Status WFL   Orientation Level Oriented X4   Following Commands Follows one step commands without difficulty   Comments Needs encouragement to participate   Subjective   Subjective "I want to get stronger "   Bed Mobility   Supine to Sit 4  Minimal assistance   Additional items Assist x 1; Increased time required;Verbal cues;LE management; Bedrails   Additional Comments OOB in chair at end of session  Transfers   Sit to Stand 2  Maximal assistance   Additional items Assist x 2;Armrests; Increased time required;Verbal cues   Stand to Sit 2  Maximal assistance   Additional items Assist x 2;Armrests; Increased time required;Verbal cues   Stand pivot 2  Maximal assistance   Additional items Assist x 2;Armrests; Increased time required;Verbal cues   Additional Comments Sat edge of bed approx 10 minutes with supervision  sat edge of bed during room setup and education   Balance   Static Sitting Fair +   Dynamic Sitting Fair   Static Standing Poor +   Dynamic Standing Poor   Endurance Deficit   Endurance Deficit Yes   Endurance Deficit Description limited by pain and fatigue   Activity Tolerance   Activity Tolerance Patient limited by fatigue;Patient limited by pain   Medical Staff Made Aware PCT present for session   Nurse Made Aware mercedes   Assessment   Prognosis Good   Problem List Decreased strength;Decreased range of motion;Decreased endurance; Impaired balance;Decreased mobility;Obesity; Decreased skin integrity;Pain   Assessment Patient was received partially sitting edge of bed  Required encouragement to participate  Sat edge of bed approx 10 minutes with supervision during room setup  Education provided on important of OOB activity, skin protection, position changes, and there-ex  Patient performed transfers with max A x 2  Verbal and tactile cues throughout for safety and proper use of RW  Patient with increased pain with mobility  Patient is making progress and will benefit from rehab at discharge  Barriers to Discharge Inaccessible home environment;Decreased caregiver support   Goals   Patient Goals TO have less pain   STG Expiration Date 08/10/19   Plan   Treatment/Interventions Functional transfer training;LE strengthening/ROM; Therapeutic exercise; Endurance training;Equipment eval/education;Gait training;Bed mobility;Spoke to nursing   Progress Slow progress, decreased activity tolerance   PT Frequency 5x/wk   Recommendation   Recommendation Short-term skilled PT   Equipment Recommended Wheelchair;Walker   PT - OK to Discharge Yes   Kael Bolaños, PT            Patient Name: Michael Guerrero  VYGHV'T Date: 8/5/2019

## 2019-08-05 NOTE — PROGRESS NOTES
[address] [facility] NOTE   Jesus Chauhan 79 y o  male MRN: [id number]  Unit/Bed#: [address] [place] [address] [id number] Encounter: [id number]  Reason for Consult: DORA on CKD     ASSESSMENT and PLAN:    68-year-old male With a past medical history of AFib, hypertension, CHF, PVD, COPD,who initially presents on 07/25 with left lower extremity wound and pain  There is concern for diabetic foot ulcer and surgery and Podiatry team were brought on board  patient was started on broad-spectrum antibiotics  After evaluation, it was deemed that the patient would need a left lower extremity amputation  Infectious disease team and cardiology teams were also brought on board  Patient underwent AKA on July 3th  Nephrology was consulted on August 4th due to acute on chronic kidney injury  1) DORA on CKD     - stage III CKD baseline 1 25-1 38 mg/dL   - Cr rising to 2 1 mg/dL on 8/3   - improving to 1 9 mg/dL on 8/4 and 1 8 mg/dL on 8/5  - Etiology likely pre renal with diuresis, obstruction ruled out on ultrasound, possibly ATN in the setting of infection/vancomycin with a level of 31 on 08/01   - UA - 8/4 is 0-1 RBC, 204 RBC, trace protein  - Urine Na - 81  - CXR - pulm vasc congestion, cardiac sillhouette enlargement  - PVR  - 0 cc  - renal u/s - no hydro; stable L renal cyst 1 3 cm;   - I/O; avoid nephrotoxic agents  - check phos in AM  - check BMP in AM  - f/u fec occ  - may need to lower allopurinol if Cr does not continue to improve  - f/u SPEP  - f/u UPEP  - f/u FLC  - add amlodipine 5 mg if BP remains above 808 systolic  2) HTN - BP rising  On metoprolol and torsemide    3) electrolytes - stable    4) acid/base - stable    5) MBD     - f/u phos in AM  - cont phos binder    6) Anemia     - low iron sat  - check stool occ    7) PVD with LE ulcer - s/p AKA      SUBJECTIVE / INTERVAL HISTORY:    UOP 2 2 L; SBP labile 123-175   Pt denies complaints    OBJECTIVE:  Current Weight: Weight - Scale: (!) 172 kg (380 lb 1 2 oz)  Vitals: 08/04/19 2239 08/05/19 0540 08/05/19 0825 08/05/19 0948   BP: 160/81  (!) 175/77    BP Location: Left arm  Left arm    Pulse: 78  70    Resp: 18  18    Temp: 98 1 °F (36 7 °C)  97 8 °F (36 6 °C)    TempSrc: Oral  Oral    SpO2: 92%  94% 93%   Weight:  (!) 172 kg (380 lb 1 2 oz)     Height:           Intake/Output Summary (Last 24 hours) at 8/5/2019 1505  Last data filed at 8/5/2019 1234  Gross per 24 hour   Intake 240 ml   Output 2150 ml   Net -1910 ml     General: NAD  Skin: no rash  Eyes: anicteric sclera  ENT: moist mucous membrane  Neck: supple  Chest: CTA b/l, no ronchii, no wheeze, no rubs, no rales  CVS: s1s2, no murmur, no gallop, no rub  Abdomen: soft, nontender, nl sounds  Extremities: trace edema LE; LLE stump site wrapped  : no mehta  Neuro: AAOX3  Psych: normal affect      Medications:    Current Facility-Administered Medications:     acetaminophen (TYLENOL) tablet 650 mg, 650 mg, Oral, Q6H PRN, Micky Arauz-Raymond PA-C    albuterol (PROVENTIL HFA,VENTOLIN HFA) inhaler 2 puff, 2 puff, Inhalation, Q6H PRN, iMcky Cartwright Astl-Raymond, PA-C, 2 puff at 07/31/19 0749    allopurinol (ZYLOPRIM) tablet 300 mg, 300 mg, Oral, Daily, Suzette Regla-Raymond, PA-C, 300 mg at 08/05/19 0836    atorvastatin (LIPITOR) tablet 20 mg, 20 mg, Oral, Daily, Suzette Astlorin-Raymond, PA-C, 20 mg at 08/05/19 0836    calcium acetate (PHOSLO) capsule 667 mg, 667 mg, Oral, TID With Meals, Laurent Alfred MD, 667 mg at 08/05/19 1210    clotrimazole (LOTRIMIN) 1 % cream, , Topical, BID, Laurent Alfred MD    diphenhydrAMINE (BENADRYL) tablet 25 mg, 25 mg, Oral, Q8H PRN, Laurent Alfred MD, 25 mg at 08/05/19 0716    fluticasone-vilanterol (BREO ELLIPTA) 100-25 mcg/inh inhaler 1 puff, 1 puff, Inhalation, Daily, Suzette Sánchez PA-C, 1 puff at 08/05/19 0946    heparin (porcine) 25,000 units in 250 mL infusion (premix), 3-20 Units/kg/hr (Order-Specific), Intravenous, Titrated, NICOLA Carias, Last Rate: 18 9 mL/hr at 08/05/19 0716, 21 Units/kg/hr at 08/05/19 0716    heparin (porcine) injection 2,000 Units, 2,000 Units, Intravenous, PRN, NICOLA Batres, 2,000 Units at 08/04/19 1449    heparin (porcine) injection 4,000 Units, 4,000 Units, Intravenous, PRN, NICOLA Batres, 4,000 Units at 08/04/19 2304    hydrALAZINE (APRESOLINE) injection 10 mg, 10 mg, Intravenous, Q6H PRN, Sergio Cornell MD    HYDROcodone-acetaminophen (NORCO) 5-325 mg per tablet 2 tablet, 2 tablet, Oral, Q4H PRN, Jackie Mora PA-C, 2 tablet at 08/04/19 2306    HYDROmorphone (DILAUDID) injection 0 5 mg, 0 5 mg, Intravenous, Q2H PRN, Ly Mora PA-C, 0 5 mg at 08/04/19 2202    insulin glargine (LANTUS) subcutaneous injection 14 Units 0 14 mL, 14 Units, Subcutaneous, HS, Suzette Sánchez PA-C, 14 Units at 08/04/19 2200    insulin lispro (HumaLOG) 100 units/mL subcutaneous injection 2-12 Units, 2-12 Units, Subcutaneous, TID AC, 4 Units at 08/05/19 1209 **AND** Fingerstick Glucose (POCT), , , TID AC, Suzette Sánchez PA-C    insulin lispro (HumaLOG) 100 units/mL subcutaneous injection 30 Units, 30 Units, Subcutaneous, TID With Meals, Gonzales Sánchez PA-C, 30 Units at 08/05/19 1210    metoprolol tartrate (LOPRESSOR) tablet 25 mg, 25 mg, Oral, Q12H [address], Suzette Sánchez PA-C, 25 mg at 08/05/19 0837    torsemide (DEMADEX) tablet 20 mg, 20 mg, Oral, BID (diuretic), Sergio Cornell MD, 20 mg at 08/05/19 1850    warfarin (COUMADIN) tablet 10 mg, 10 mg, Oral, Daily (warfarin), Sergio Cornell MD, 10 mg at 08/04/19 1945    Laboratory Results:  Results from last 7 days   Lab Units 08/05/19  0541 08/04/19  0546 08/03/19  2032 08/03/19  0537 08/02/19  0623 08/01/19  1616 08/01/19  0457 07/31/19  0617   WBC Thousand/uL 6 51 7 71  --  8 67 9 42 9 92 9 82 6 98   HEMOGLOBIN g/dL 8 4* 8 3*  --  8 2* 8 6* 9 0* 9 2* 9 5*   HEMATOCRIT % 28 8* 27 9*  --  27 9* 28 9* 30 8* 31 5* 32 2*   PLATELETS Thousands/uL 332 325  -- 310 265 308 261 246   POTASSIUM mmol/L 4 9 5 0 5 1 4 6 4 5  --  5 1 4 5   CHLORIDE mmol/L 104 103 101 103 104  --  105 105   CO2 mmol/L 24 26 27 25 28  --  26 30   BUN mg/dL 70* 65* 63* 54* 45*  --  42* 54*   CREATININE mg/dL 1 76* 1 86* 2 14* 1 88* 1 60*  --  1 39* 1 45*   CALCIUM mg/dL 8 6 8 4 8 4 8 7 8 3  --  8 8 8 5   MAGNESIUM mg/dL 2 2 2 0  --   --   --   --   --   --    PHOSPHORUS mg/dL 5 4* 4 8*  --   --   --   --   --   --

## 2019-08-05 NOTE — PLAN OF CARE
Problem: Potential for Falls  Goal: Patient will remain free of falls  Description  INTERVENTIONS:  - Assess patient frequently for physical needs  -  Identify cognitive and physical deficits and behaviors that affect risk of falls    -  [place] fall precautions as indicated by assessment   - Educate patient/family on patient safety including physical limitations  - Instruct patient to call for assistance with activity based on assessment  - Modify environment to reduce risk of injury  - Consider OT/PT consult to assist with strengthening/mobility  Outcome: Progressing     Problem: Prexisting or High Potential for Compromised Skin Integrity  Goal: Skin integrity is maintained or improved  Description  INTERVENTIONS:  - Identify patients at risk for skin breakdown  - Assess and monitor skin integrity  - Assess and monitor nutrition and hydration status  - Monitor labs (i e  albumin)  - Assess for incontinence   - Turn and reposition patient  - Assist with mobility/ambulation  - Relieve pressure over bony prominences  - Avoid friction and shearing  - Provide appropriate hygiene as needed including keeping skin clean and dry  - Evaluate need for skin moisturizer/barrier cream  - Collaborate with interdisciplinary team (i e  Nutrition, Rehabilitation, etc )   - Patient/family teaching  Outcome: Progressing     Problem: SKIN/TISSUE INTEGRITY - ADULT  Goal: Skin integrity remains intact  Description  INTERVENTIONS  - Identify patients at risk for skin breakdown  - Assess and monitor skin integrity  - Assess and monitor nutrition and hydration status  - Monitor labs (i e  albumin)  - Assess for incontinence   - Turn and reposition patient  - Assist with mobility/ambulation  - Relieve pressure over bony prominences  - Avoid friction and shearing  - Provide appropriate hygiene as needed including keeping skin clean and dry  - Evaluate need for skin moisturizer/barrier cream  - Collaborate with interdisciplinary team (i e  Nutrition, Rehabilitation, etc )   - Patient/family teaching  Outcome: Progressing  Goal: Incision(s), wounds(s) or drain site(s) healing without S/S of infection  Description  INTERVENTIONS  - Assess and document risk factors for skin impairment   - Assess and document dressing, incision, wound bed, drain sites and surrounding tissue  - Initiate Nutrition services consult and/or wound management as needed  Outcome: Progressing     Problem: MUSCULOSKELETAL - ADULT  Goal: Maintain or return mobility to safest level of function  Description  INTERVENTIONS:  - Assess patient's ability to carry out ADLs; assess patient's baseline for ADL function and identify physical deficits which impact ability to perform ADLs (bathing, care of mouth/teeth, toileting, grooming, dressing, etc )  - Assess/evaluate cause of self-care deficits   - Assess range of motion  - Assess patient's mobility; develop plan if impaired  - Assess patient's need for assistive devices and provide as appropriate  - Encourage maximum independence but intervene and supervise when necessary  - Involve family in performance of ADLs  - Assess for home care needs following discharge   - Request OT consult to assist with ADL evaluation and planning for discharge  - Provide patient education as appropriate  Outcome: Progressing     Problem: PAIN - ADULT  Goal: Verbalizes/displays adequate comfort level or baseline comfort level  Description  Interventions:  - Encourage patient to monitor pain and request assistance  - Assess pain using appropriate pain scale  - Administer analgesics based on type and severity of pain and evaluate response  - Implement non-pharmacological measures as appropriate and evaluate response  - Consider cultural and social influences on pain and pain management  - Notify physician/advanced practitioner if interventions unsuccessful or patient reports new pain  Outcome: Progressing     Problem: INFECTION - ADULT  Goal: Absence or prevention of progression during hospitalization  Description  INTERVENTIONS:  - Assess and monitor for signs and symptoms of infection  - Monitor lab/diagnostic results  - Monitor all insertion sites, i e  indwelling lines, tubes, and drains  - Monitor endotracheal (as able) and nasal secretions for changes in amount and color  - [place] appropriate cooling/warming therapies per order  - Administer medications as ordered  - Instruct and encourage patient and family to use good hand hygiene technique  - Identify and instruct in appropriate isolation precautions for identified infection/condition  Outcome: Progressing     Problem: SAFETY ADULT  Goal: Patient will remain free of falls  Description  INTERVENTIONS:  - Assess patient frequently for physical needs  -  Identify cognitive and physical deficits and behaviors that affect risk of falls    -  [place] fall precautions as indicated by assessment   - Educate patient/family on patient safety including physical limitations  - Instruct patient to call for assistance with activity based on assessment  - Modify environment to reduce risk of injury  - Consider OT/PT consult to assist with strengthening/mobility  Outcome: Progressing     Problem: DISCHARGE PLANNING  Goal: Discharge to home or other facility with appropriate resources  Description  INTERVENTIONS:  - Identify barriers to discharge w/patient and caregiver  - Arrange for needed discharge resources and transportation as appropriate  - Identify discharge learning needs (meds, wound care, etc )  - Arrange for interpretive services to assist at discharge as needed  - Refer to Case Management Department for coordinating discharge planning if the patient needs post-hospital services based on physician/advanced practitioner order or complex needs related to functional status, cognitive ability, or social support system  Outcome: Progressing     Problem: Knowledge Deficit  Goal: Patient/family/caregiver demonstrates understanding of disease process, treatment plan, medications, and discharge instructions  Description  Complete learning assessment and assess knowledge base  Interventions:  - Provide teaching at level of understanding  - Provide teaching via preferred learning methods  Outcome: Progressing     Problem: Nutrition/Hydration-ADULT  Goal: Nutrient/Hydration intake appropriate for improving, restoring or maintaining nutritional needs  Description  Monitor and assess patient's nutrition/hydration status for malnutrition (ex- brittle hair, bruises, dry skin, pale skin and conjunctiva, muscle wasting, smooth red tongue, and disorientation)  Collaborate with interdisciplinary team and initiate plan and interventions as ordered  Monitor patient's weight and dietary intake as ordered or per policy  Utilize nutrition screening tool and intervene per policy  Determine patient's food preferences and provide high-protein, high-caloric foods as appropriate       INTERVENTIONS:  - Monitor oral intake, urinary output, labs, and treatment plans  - Assess nutrition and hydration status and recommend course of action  - Evaluate amount of meals eaten  - Assist patient with eating if necessary   - Allow adequate time for meals  - Recommend/ encourage appropriate diets, oral nutritional supplements, and vitamin/mineral supplements  - Order, calculate, and assess calorie counts as needed  - Recommend, monitor, and adjust tube feedings and TPN/PPN based on assessed needs  - Assess need for intravenous fluids  - Provide specific nutrition/hydration education as appropriate  - Include patient/family/caregiver in decisions related to nutrition  Outcome: Progressing

## 2019-08-06 PROBLEM — R21 RASH: Status: ACTIVE | Noted: 2019-08-06

## 2019-08-06 LAB
ALBUMIN SERPL ELPH-MCNC: 2.56 G/DL (ref 3.5–5)
ALBUMIN SERPL ELPH-MCNC: 38.2 % (ref 52–65)
ALPHA1 GLOB SERPL ELPH-MCNC: 0.51 G/DL (ref 0.1–0.4)
ALPHA1 GLOB SERPL ELPH-MCNC: 7.6 % (ref 2.5–5)
ALPHA2 GLOB SERPL ELPH-MCNC: 1.03 G/DL (ref 0.4–1.2)
ALPHA2 GLOB SERPL ELPH-MCNC: 15.3 % (ref 7–13)
ANION GAP SERPL CALCULATED.3IONS-SCNC: 9 MMOL/L (ref 4–13)
APTT PPP: 66 SECONDS (ref 23–37)
BETA GLOB ABNORMAL SERPL ELPH-MCNC: 0.54 G/DL (ref 0.4–0.8)
BETA1 GLOB SERPL ELPH-MCNC: 8 % (ref 5–13)
BETA2 GLOB SERPL ELPH-MCNC: 14.5 % (ref 2–8)
BETA2+GAMMA GLOB SERPL ELPH-MCNC: 0.97 G/DL (ref 0.2–0.5)
BILIRUB UR QL STRIP: NEGATIVE
BUN SERPL-MCNC: 74 MG/DL (ref 5–25)
CALCIUM SERPL-MCNC: 8.4 MG/DL (ref 8.3–10.1)
CHLORIDE SERPL-SCNC: 102 MMOL/L (ref 100–108)
CLARITY UR: CLEAR
CO2 SERPL-SCNC: 28 MMOL/L (ref 21–32)
COLOR UR: YELLOW
CREAT SERPL-MCNC: 1.74 MG/DL (ref 0.6–1.3)
CREAT UR-MCNC: 45.9 MG/DL
GAMMA GLOB ABNORMAL SERPL ELPH-MCNC: 1.1 G/DL (ref 0.5–1.6)
GAMMA GLOB SERPL ELPH-MCNC: 16.4 % (ref 12–22)
GFR SERPL CREATININE-BSD FRML MDRD: 40 ML/MIN/1.73SQ M
GLUCOSE SERPL-MCNC: 157 MG/DL (ref 65–140)
GLUCOSE SERPL-MCNC: 162 MG/DL (ref 65–140)
GLUCOSE SERPL-MCNC: 187 MG/DL (ref 65–140)
GLUCOSE SERPL-MCNC: 244 MG/DL (ref 65–140)
GLUCOSE SERPL-MCNC: 259 MG/DL (ref 65–140)
GLUCOSE UR STRIP-MCNC: NEGATIVE MG/DL
HGB UR QL STRIP.AUTO: NEGATIVE
IGG/ALB SER: 0.62 {RATIO} (ref 1.1–1.8)
INR PPP: 1.2 (ref 0.84–1.19)
INTERPRETATION UR IFE-IMP: NORMAL
KETONES UR STRIP-MCNC: NEGATIVE MG/DL
LEUKOCYTE ESTERASE UR QL STRIP: NEGATIVE
M PEAK ID 2: 4.5 %
M PROTEIN 1 MFR SERPL ELPH: 7.9 %
M PROTEIN 1 SERPL ELPH-MCNC: 0.53 G/DL
M PROTEIN 2 SERPL ELPH-MCNC: 0.3 G/DL
NITRITE UR QL STRIP: NEGATIVE
PH UR STRIP.AUTO: 5 [PH]
PHOSPHATE SERPL-MCNC: 4.6 MG/DL (ref 2.3–4.1)
POTASSIUM SERPL-SCNC: 4.7 MMOL/L (ref 3.5–5.3)
PROT PATTERN SERPL ELPH-IMP: ABNORMAL
PROT SERPL-MCNC: 6.7 G/DL (ref 6.4–8.2)
PROT UR STRIP-MCNC: NEGATIVE MG/DL
PROT UR-MCNC: 17 MG/DL
PROT/CREAT UR: 0.37 MG/G{CREAT} (ref 0–0.1)
PROTHROMBIN TIME: 14.9 SECONDS (ref 11.6–14.5)
SODIUM SERPL-SCNC: 139 MMOL/L (ref 136–145)
SP GR UR STRIP.AUTO: 1.01 (ref 1–1.03)
UROBILINOGEN UR QL STRIP.AUTO: 0.2 E.U./DL

## 2019-08-06 PROCEDURE — 85730 THROMBOPLASTIN TIME PARTIAL: CPT | Performed by: INTERNAL MEDICINE

## 2019-08-06 PROCEDURE — 82948 REAGENT STRIP/BLOOD GLUCOSE: CPT

## 2019-08-06 PROCEDURE — 81003 URINALYSIS AUTO W/O SCOPE: CPT | Performed by: INTERNAL MEDICINE

## 2019-08-06 PROCEDURE — 84156 ASSAY OF PROTEIN URINE: CPT | Performed by: INTERNAL MEDICINE

## 2019-08-06 PROCEDURE — 82570 ASSAY OF URINE CREATININE: CPT | Performed by: INTERNAL MEDICINE

## 2019-08-06 PROCEDURE — 99232 SBSQ HOSP IP/OBS MODERATE 35: CPT | Performed by: INTERNAL MEDICINE

## 2019-08-06 PROCEDURE — 84100 ASSAY OF PHOSPHORUS: CPT | Performed by: INTERNAL MEDICINE

## 2019-08-06 PROCEDURE — 80048 BASIC METABOLIC PNL TOTAL CA: CPT | Performed by: INTERNAL MEDICINE

## 2019-08-06 PROCEDURE — 94640 AIRWAY INHALATION TREATMENT: CPT

## 2019-08-06 PROCEDURE — 94760 N-INVAS EAR/PLS OXIMETRY 1: CPT

## 2019-08-06 PROCEDURE — 85610 PROTHROMBIN TIME: CPT | Performed by: INTERNAL MEDICINE

## 2019-08-06 RX ORDER — PREDNISONE 20 MG/1
40 TABLET ORAL DAILY
Status: DISCONTINUED | OUTPATIENT
Start: 2019-08-06 | End: 2019-08-11

## 2019-08-06 RX ADMIN — INSULIN LISPRO 30 UNITS: 100 INJECTION, SOLUTION INTRAVENOUS; SUBCUTANEOUS at 17:07

## 2019-08-06 RX ADMIN — METOPROLOL TARTRATE 25 MG: 25 TABLET, FILM COATED ORAL at 22:22

## 2019-08-06 RX ADMIN — INSULIN LISPRO 2 UNITS: 100 INJECTION, SOLUTION INTRAVENOUS; SUBCUTANEOUS at 09:21

## 2019-08-06 RX ADMIN — INSULIN GLARGINE 14 UNITS: 100 INJECTION, SOLUTION SUBCUTANEOUS at 22:21

## 2019-08-06 RX ADMIN — PREDNISONE 40 MG: 20 TABLET ORAL at 09:26

## 2019-08-06 RX ADMIN — CALCIUM ACETATE 667 MG: 667 CAPSULE ORAL at 09:22

## 2019-08-06 RX ADMIN — HYDROCODONE BITARTRATE AND ACETAMINOPHEN 2 TABLET: 5; 325 TABLET ORAL at 23:42

## 2019-08-06 RX ADMIN — METOPROLOL TARTRATE 25 MG: 25 TABLET, FILM COATED ORAL at 09:22

## 2019-08-06 RX ADMIN — INSULIN LISPRO 30 UNITS: 100 INJECTION, SOLUTION INTRAVENOUS; SUBCUTANEOUS at 09:20

## 2019-08-06 RX ADMIN — INSULIN LISPRO 4 UNITS: 100 INJECTION, SOLUTION INTRAVENOUS; SUBCUTANEOUS at 11:59

## 2019-08-06 RX ADMIN — FLUTICASONE FUROATE AND VILANTEROL TRIFENATATE 1 PUFF: 100; 25 POWDER RESPIRATORY (INHALATION) at 08:36

## 2019-08-06 RX ADMIN — ATORVASTATIN CALCIUM 20 MG: 20 TABLET, FILM COATED ORAL at 09:22

## 2019-08-06 RX ADMIN — INSULIN LISPRO 2 UNITS: 100 INJECTION, SOLUTION INTRAVENOUS; SUBCUTANEOUS at 17:07

## 2019-08-06 RX ADMIN — CALCIUM ACETATE 667 MG: 667 CAPSULE ORAL at 13:07

## 2019-08-06 RX ADMIN — WARFARIN SODIUM 10 MG: 10 TABLET ORAL at 17:06

## 2019-08-06 RX ADMIN — TORSEMIDE 20 MG: 20 TABLET ORAL at 09:22

## 2019-08-06 RX ADMIN — INSULIN LISPRO 30 UNITS: 100 INJECTION, SOLUTION INTRAVENOUS; SUBCUTANEOUS at 11:58

## 2019-08-06 RX ADMIN — HYDROCODONE BITARTRATE AND ACETAMINOPHEN 2 TABLET: 5; 325 TABLET ORAL at 11:44

## 2019-08-06 RX ADMIN — CLOTRIMAZOLE 1 APPLICATION: 10 CREAM TOPICAL at 09:20

## 2019-08-06 RX ADMIN — TORSEMIDE 20 MG: 20 TABLET ORAL at 17:06

## 2019-08-06 RX ADMIN — HEPARIN SODIUM AND DEXTROSE 21 UNITS/KG/HR: 10000; 5 INJECTION INTRAVENOUS at 11:46

## 2019-08-06 RX ADMIN — CLOTRIMAZOLE: 10 CREAM TOPICAL at 17:11

## 2019-08-06 NOTE — ASSESSMENT & PLAN NOTE
· Patient had volume overload post surgery  Was diuresed with IV Lasix initially and switched to torsemide  · Daily weight and I&Os  · Continue on torsemide  · Continue beta-blockers/Aldactone/atorvastatin      Weight (last 2 days)     Date/Time   Weight    08/06/19 0600   (!) 151 (333 78)    08/05/19 0540   (!) 172 (380 07)    08/04/19 0921   (!) 158 (349 21)    08/04/19 0714   (!) 158 (348 33)    08/04/19 0548   (!) 164 (362)              Intake/Output Summary (Last 24 hours) at 8/6/2019 0918  Last data filed at 8/6/2019 0500  Gross per 24 hour   Intake --   Output 2011 ml   Net -2011 ml

## 2019-08-06 NOTE — UTILIZATION REVIEW
Continued Stay Review    Date:8/6/2019                          Current Patient Class: inpatient  Current Level of Care: med surg    HPI:68 y o  male initially admitted on 7/25/2019 inpatient due to LLE chronic diabetic foot with venous stasis lymphedema and cellulitis  Patient treated with  IV antibiotics  MRI of lle form 7/29 concerning for acute infectious process near the left  Cuboid and metatarsal areas  On 7/31/2019 taken to OR  On 8/4 nephrology consulted on acute on chronic kidney injury  Baseline creatinine is 1 25 - 1 38  7/31/2019 Procedure - Left above the knee amputation      Assessment/Plan:  Patient is post op day 64 of left AKA for non healing diabetic foot wound with Charcot deformity/cellulits  complicated by uncontrolled diabetes, diabetic neuropathy, morbid obesity and limited mobility  Today has rash which had spread to buttocks and forearms, unclear etiology and steroids started    Creatinine is 1 7 , if does not lower will need to lower allopurinol  Continue heparin infusion with coumadin bridging  For chronic atrial fib  Pertinent Labs/Diagnostic Results:   8/4/2019 CxR- Stable enlargement of the cardiac silhouette and pulmonary vascular congestion    2   Bibasilar linear opacities compatible with atelectasis or scarring  Results from last 7 days   Lab Units 08/05/19  0541 08/04/19  0546 08/03/19  0537 08/02/19  0623 08/01/19  1616 08/01/19  0457 07/31/19  0617   WBC Thousand/uL 6 51 7 71 8 67 9 42 9 92 9 82 6 98   HEMOGLOBIN g/dL 8 4* 8 3* 8 2* 8 6* 9 0* 9 2* 9 5*   HEMATOCRIT % 28 8* 27 9* 27 9* 28 9* 30 8* 31 5* 32 2*   PLATELETS Thousands/uL 332 325 310 265 308 261 246   NEUTROS ABS Thousands/µL 3 73  --   --  6 29  --  7 23  --    BANDS PCT %  --  1  --   --   --   --  3     Results from last 7 days   Lab Units 08/06/19  0448 08/05/19  0541 08/04/19  0546 08/03/19  2032 08/03/19  0537   SODIUM mmol/L 139 139 139 139 137   POTASSIUM mmol/L 4 7 4 9 5 0 5 1 4 6   CHLORIDE mmol/L 102 104 103 101 103   CO2 mmol/L 28 24 26 27 25   ANION GAP mmol/L 9 11 10 11 9   BUN mg/dL 74* 70* 65* 63* 54*   CREATININE mg/dL 1 74* 1 76* 1 86* 2 14* 1 88*   EGFR ml/min/1 73sq m 40 39 37 31 36   CALCIUM mg/dL 8 4 8 6 8 4 8 4 8 7   MAGNESIUM mg/dL  --  2 2 2 0  --   --    PHOSPHORUS mg/dL 4 6* 5 4* 4 8*  --   --      Results from last 7 days   Lab Units 08/04/19  0546   TOTAL PROTEIN g/dL 6 7     Results from last 7 days   Lab Units 08/06/19  1129 08/06/19  0709 08/05/19  2055 08/05/19  1607 08/05/19  1130 08/05/19  0722 08/04/19  2110 08/04/19  1715 08/04/19  1139 08/04/19  0726   POC GLUCOSE mg/dl 244* 162* 189* 149* 209* 154* 152* 107 258* 162*     Results from last 7 days   Lab Units 08/06/19  0448 08/05/19  0541 08/04/19  0546 08/03/19  2032 08/03/19  0537 08/02/19  0623 08/01/19  0457 07/31/19  0617   GLUCOSE RANDOM mg/dL 157* 149* 136 200* 171* 151* 171* 139     Results from last 7 days   Lab Units 08/06/19  0448 08/05/19  1350 08/05/19  0542 08/05/19  0541  08/04/19  0546   PROTIME seconds 14 9*  --   --  14 3  --  14 5   INR  1 20*  --   --  1 14  --  1 16   PTT seconds 66* 60* 75*  --    < > 51*    < > = values in this interval not displayed       Results from last 7 days   Lab Units 08/05/19  0542   FERRITIN ng/mL 134     Results from last 7 days   Lab Units 08/04/19  1141   CLARITY UA  Clear   COLOR UA  Yellow   SPEC GRAV UA  1 020   PH UA  5 5   GLUCOSE UA mg/dl Negative   KETONES UA mg/dl Negative   BLOOD UA  Trace-Intact*   PROTEIN UA mg/dl Trace*   NITRITE UA  Negative   BILIRUBIN UA  Negative   UROBILINOGEN UA E U /dl 0 2   LEUKOCYTES UA  Negative   WBC UA /hpf 2-4*   RBC UA /hpf 0-1*   BACTERIA UA /hpf Occasional   EPITHELIAL CELLS WET PREP /hpf Occasional   SODIUM UR  81     Results from last 7 days   Lab Units 08/04/19  0546 08/03/19  0537 07/31/19  0617   TOTAL COUNTED  100 100 100     Vital Signs:   08/06/19 0740  97 9 °F (36 6 °C)  65  18  148/70  92 %  None (Room air)  Sitting 08/05/19 2227  97 6 °F (36 4 °C)  70  18  155/67  93 %  None (Room air)  Lying       Medications:   Scheduled Meds:   Current Facility-Administered Medications:  acetaminophen 650 mg Oral Q6H PRN    albuterol 2 puff Inhalation Q6H PRN    atorvastatin 20 mg Oral Daily    clotrimazole  Topical BID    diphenhydrAMINE 25 mg Oral Q8H PRN    fluticasone-vilanterol 1 puff Inhalation Daily    heparin (porcine) 3-20 Units/kg/hr (Order-Specific) Intravenous Titrated Last Rate: 21 Units/kg/hr (08/06/19 1146)   heparin (porcine) 2,000 Units Intravenous PRN    heparin (porcine) 4,000 Units Intravenous PRN    hydrALAZINE 10 mg Intravenous Q6H PRN    HYDROcodone-acetaminophen - used x 1  2 tablet Oral Q4H PRN    HYDROmorphone 0 5 mg Intravenous Q2H PRN    insulin glargine 14 Units Subcutaneous HS    insulin lispro 2-12 Units Subcutaneous TID AC    insulin lispro 30 Units Subcutaneous TID With Meals    metoprolol tartrate 25 mg Oral Q12H JULIEN    predniSONE 40 mg Oral Daily    torsemide 20 mg Oral BID (diuretic)    warfarin 10 mg Oral Daily (warfarin)        Discharge Plan:  PT recommends short term skilled PT - [facility]  Phone: [phone]; Fax [phone]  [email or web address]  ATTENTION: Please call with any questions or concerns to [phone]  and carefully listen to the prompts so that you are directed to the right person  Send all requests for admission clinical reviews, approved or denied determinations and any other requests to fax [phone]   All voicemails are confidential

## 2019-08-06 NOTE — SOCIAL WORK
Continue to follow  Lisa Dewey in admissions at [facility] updated on Pt's status  Will need to obtain insurance auth for SNF prior to discharge  Will follow

## 2019-08-06 NOTE — ASSESSMENT & PLAN NOTE
Lab Results   Component Value Date    HGBA1C 9 4 (H) 12/27/2018       Recent Labs     08/05/19  1130 08/05/19  1607 08/05/19  2055 08/06/19  0709   POCGLU 209* 149* 189* 162*     · 07/31/2019: Left AKA  · Patient has completed the course of IV Vanco/Cefepime as per ID  · Monitor WBC and fever curve  · Continue Novolog/Lantus/sliding scale coverage  · Podiatry, ID general surgery on board  · Continue with current pain regimen

## 2019-08-06 NOTE — ASSESSMENT & PLAN NOTE
· Creatinine 1 74 today from baseline of 1 2-14  · Nephrology follow-up  · Ultrasound renals = No evidence of nephrolithiasis or hydronephrosis  Stable left renal cyst measuring 1 3 cm  Unremarkable bladder    · Bladder scan and PVRs  · Avoid hypotension/NSAIDs

## 2019-08-06 NOTE — PROGRESS NOTES
[address] [facility] NOTE   Justina Almazan 79 y o  male MRN: [id number]  Unit/Bed#: [address] [place] [address]- Encounter: [id number]  Reason for Consult: DORA on CKD    ASSESSMENT and PLAN:    61-year-old male With a past medical history of AFib, hypertension, CHF, PVD, COPD,who initially presents on 07/25 with left lower extremity wound and pain  There is concern for diabetic foot ulcer and surgery and Podiatry team were brought on board  patient was started on broad-spectrum antibiotics  After evaluation, it was deemed that the patient would need a left lower extremity amputation  Infectious disease team and cardiology teams were also brought on board  Patient underwent AKA on July 3th  Nephrology was consulted on August 4th due to acute on chronic kidney injury     1) DORA on CKD      - stage III CKD baseline 1 25-1 38 mg/dL   - Cr rising to 2 1 mg/dL on 8/3   - improving to 1 9 mg/dL on 8/4 and 1 7 on 8/6/19  - Etiology likely pre renal with diuresis, obstruction ruled out on ultrasound, possibly ATN in the setting of infection/vancomycin with a level of 31 on 08/01   - UA - 8/4 is 0-1 RBC, 204 RBC, trace protein  - Urine Na - 81  - CXR - pulm vasc congestion, cardiac sillhouette enlargement  - PVR  - 0 cc  - renal u/s - no hydro; stable L renal cyst 1 3 cm;   - I/O; avoid nephrotoxic agents  - check BMP in AM  - f/u fec occ  - may need to lower allopurinol if Cr does not continue to improve  - SPEP - IgG Kappa  - f/u UPEP  - f/u FLC  - add amlodipine 5 mg if BP remains above 449 systolic       2) HTN - BP rising   On metoprolol and torsemide     3) electrolytes -     - phos improving to 4 6     4) acid/base - stable     5) MBD      - cont phos binder     6) Anemia      - low iron sat  - check stool occ     7) PVD with LE ulcer - s/p AKA    8) IgG Kappa     - rec consultation with Hematology inpt vs outpt - can consider outpt if pt remains stable otherwise    9) rash - macular/slight papular, non blanching    - started on steroids per Primary team  - unclear etiology  - UA was relatively bland but consider vasculitic rash also  - check serological work up - ordered    10) azotemia - awaiting fec occ  Also is on steroids now    SUBJECTIVE / INTERVAL HISTORY:    New rash  Started on steroids  UOP 2 4 L; -155  OBJECTIVE:  Current Weight: Weight - Scale: (!) 151 kg (333 lb 12 4 oz)  Vitals:    08/05/19 2227 08/06/19 0600 08/06/19 0740 08/06/19 0837   BP: 155/67  148/70    BP Location: Left arm  Left arm    Pulse: 70  65    Resp: 18  18    Temp: 97 6 °F (36 4 °C)  97 9 °F (36 6 °C)    TempSrc: Tympanic  Oral    SpO2: 93%  92% 93%   Weight:  (!) 151 kg (333 lb 12 4 oz)     Height:           Intake/Output Summary (Last 24 hours) at 8/6/2019 1418  Last data filed at 8/6/2019 1301  Gross per 24 hour   Intake --   Output 2061 ml   Net -2061 ml     General: NAD  Skin: rash b /l UE and back  maculopapular  Eyes: anicteric sclera  ENT: moist mucous membrane  Neck: supple  Chest: CTA b/l, no ronchii, no wheeze, no rubs, no rales  CVS: s1s2, no murmur, no gallop, no rub  Abdomen: soft, nontender, nl sounds  Extremities: no sig edema   Stump site wrapped  : no mehta  Neuro: AAOX3  Psych: normal affect    Medications:    Current Facility-Administered Medications:     acetaminophen (TYLENOL) tablet 650 mg, 650 mg, Oral, Q6H PRN, Alexsander Sánchez PA-C    albuterol (PROVENTIL HFA,VENTOLIN HFA) inhaler 2 puff, 2 puff, Inhalation, Q6H PRN, Alexsander Sánchez PA-C, 2 puff at 07/31/19 0749    atorvastatin (LIPITOR) tablet 20 mg, 20 mg, Oral, Daily, Suzette Sánchez PA-C, 20 mg at 08/06/19 8585    calcium acetate (PHOSLO) capsule 667 mg, 667 mg, Oral, TID With Meals, Kim Tyler MD, 667 mg at 08/06/19 1307    clotrimazole (LOTRIMIN) 1 % cream, , Topical, BID, Kim Tyler MD, 1 application at 18/29/45 0920    diphenhydrAMINE (BENADRYL) tablet 25 mg, 25 mg, Oral, Q8H PRN, Kim Tyler MD, 25 mg at 08/05/19 5259    fluticasone-vilanterol (BREO ELLIPTA) 100-25 mcg/inh inhaler 1 puff, 1 puff, Inhalation, Daily, Suzette Sánchez PA-C, 1 puff at 08/06/19 0836    heparin (porcine) 25,000 units in 250 mL infusion (premix), 3-20 Units/kg/hr (Order-Specific), Intravenous, Titrated, NICOLA Carias, Last Rate: 18 9 mL/hr at 08/06/19 1146, 21 Units/kg/hr at 08/06/19 1146    heparin (porcine) injection 2,000 Units, 2,000 Units, Intravenous, PRN, NICOLA Batres, 2,000 Units at 08/04/19 1449    heparin (porcine) injection 4,000 Units, 4,000 Units, Intravenous, PRN, NICOLA Batres, 4,000 Units at 08/04/19 2304    hydrALAZINE (APRESOLINE) injection 10 mg, 10 mg, Intravenous, Q6H PRN, Sergio Cornell MD    HYDROcodone-acetaminophen (NORCO) 5-325 mg per tablet 2 tablet, 2 tablet, Oral, Q4H PRN, Jackie Mora PA-C, 2 tablet at 08/06/19 1144    HYDROmorphone (DILAUDID) injection 0 5 mg, 0 5 mg, Intravenous, Q2H PRN, Dalila Mora PA-C, 0 5 mg at 08/05/19 2112    insulin glargine (LANTUS) subcutaneous injection 14 Units 0 14 mL, 14 Units, Subcutaneous, HS, Suzette Sánchez PA-C, 14 Units at 08/05/19 2119    insulin lispro (HumaLOG) 100 units/mL subcutaneous injection 2-12 Units, 2-12 Units, Subcutaneous, TID AC, 4 Units at 08/06/19 1159 **AND** Fingerstick Glucose (POCT), , , TID AC, Suzette Sánchez PA-C    insulin lispro (HumaLOG) 100 units/mL subcutaneous injection 30 Units, 30 Units, Subcutaneous, TID With Meals, Gonzales Sánchez PA-C, 30 Units at 08/06/19 1158    metoprolol tartrate (LOPRESSOR) tablet 25 mg, 25 mg, Oral, Q12H [address], Suzette Sánchez PA-C, 25 mg at 08/06/19 2212    predniSONE tablet 40 mg, 40 mg, Oral, Daily, Sergio Cornell MD, 40 mg at 08/06/19 0926    torsemide (DEMADEX) tablet 20 mg, 20 mg, Oral, BID (diuretic), Sergio Cornell MD, 20 mg at 08/06/19 9972    warfarin (COUMADIN) tablet 10 mg, 10 mg, Oral, Daily (warfarin), Zohaib Hankins, MD, 10 mg at 08/05/19 1700    Laboratory Results:  Results from last 7 days   Lab Units 08/06/19  0448 08/05/19  0541 08/04/19  0546 08/03/19  2032 08/03/19  0537 08/02/19  0623 08/01/19  1616 08/01/19  0457 07/31/19  0617   WBC Thousand/uL  --  6 51 7 71  --  8 67 9 42 9 92 9 82 6 98   HEMOGLOBIN g/dL  --  8 4* 8 3*  --  8 2* 8 6* 9 0* 9 2* 9 5*   HEMATOCRIT %  --  28 8* 27 9*  --  27 9* 28 9* 30 8* 31 5* 32 2*   PLATELETS Thousands/uL  --  332 325  --  310 265 308 261 246   POTASSIUM mmol/L 4 7 4 9 5 0 5 1 4 6 4 5  --  5 1 4 5   CHLORIDE mmol/L 102 104 103 101 103 104  --  105 105   CO2 mmol/L 28 24 26 27 25 28  --  26 30   BUN mg/dL 74* 70* 65* 63* 54* 45*  --  42* 54*   CREATININE mg/dL 1 74* 1 76* 1 86* 2 14* 1 88* 1 60*  --  1 39* 1 45*   CALCIUM mg/dL 8 4 8 6 8 4 8 4 8 7 8 3  --  8 8 8 5   MAGNESIUM mg/dL  --  2 2 2 0  --   --   --   --   --   --    PHOSPHORUS mg/dL 4 6* 5 4* 4 8*  --   --   --   --   --   --

## 2019-08-06 NOTE — PROGRESS NOTES
Progress Note - Andrew Mendoza 1950, 79 y o  male MRN: [id number]    Unit/Bed#: [address] [place] [address] Encounter: [id number]    Primary Care Provider: Ksenia Berumen MD   Date and time admitted to hospital: 7/25/2019  1:04 PM        Rash  Assessment & Plan  Will start on prednisone  Continue Benadryl p r n  Acute renal failure superimposed on stage 3 chronic kidney disease (HCC)  Assessment & Plan  · Creatinine 1 74 today from baseline of 1 2-14  · Nephrology follow-up  · Ultrasound renals = No evidence of nephrolithiasis or hydronephrosis  Stable left renal cyst measuring 1 3 cm  Unremarkable bladder  · Bladder scan and PVRs  · Avoid hypotension/NSAIDs    Moderate persistent asthma without complication  Assessment & Plan  · cont albuterol/Advair    Acute on chronic diastolic congestive heart failure ([facility] [address] )  Assessment & Plan  · Patient had volume overload post surgery  Was diuresed with IV Lasix initially and switched to torsemide  · Daily weight and I&Os  · Continue on torsemide  · Continue beta-blockers/Aldactone/atorvastatin  Weight (last 2 days)     Date/Time   Weight    08/06/19 0600   (!) 151 (333 78)    08/05/19 0540   (!) 172 (380 07)    08/04/19 0921   (!) 158 (349 21)    08/04/19 0714   (!) 158 (348 33)    08/04/19 0548   (!) 164 (362)              Intake/Output Summary (Last 24 hours) at 8/6/2019 0918  Last data filed at 8/6/2019 0500  Gross per 24 hour   Intake --   Output 2011 ml   Net -2011 ml       Morbid obesity ([facility] [address] )  Assessment & Plan  · Encourage weight loss    Chronic atrial fibrillation ([facility] [address] )  Assessment & Plan  · Patient has been on heparin drip and Coumadin on 08/01/2019 after discussion with surgery  Overlap until INR is therapeutic  · Continue Lopressor    · Cardiology on board    Essential hypertension  Assessment & Plan  · continue torsemide/ beta-blocker/Aldactone  · Cozaar on hold    Chronic venous stasis dermatitis of both lower extremities  Assessment & Plan  · Chronic, POA   · S/P left AKA  · podiatry/wound care  * Type 2 diabetes mellitus with left diabetic foot ulcer [facility])  Assessment & Plan  Lab Results   Component Value Date    HGBA1C 9 4 (H) 12/27/2018       Recent Labs     08/05/19  1130 08/05/19  1607 08/05/19  2055 08/06/19  0709   POCGLU 209* 149* 189* 162*     · 07/31/2019: Left AKA  · Patient has completed the course of IV Vanco/Cefepime as per ID  · Monitor WBC and fever curve  · Continue Novolog/Lantus/sliding scale coverage  · Podiatry, ID general surgery on board  · Continue with current pain regimen  Labs & Imaging: I have personally reviewed pertinent reports  VTE Pharmacologic Prophylaxis: Heparin and Warfarin (Coumadin)  VTE Mechanical Prophylaxis: sequential compression device    Code Status:   Level 1 - Full Code    Patient Centered Rounds: I have performed bedside rounds with nursing staff today  Discussions with Specialists or Other Care Team Provider:     Education and Discussions with Family / Patient:  Wife    Current Length of Stay: 15 day(s)    Current Patient Status: Inpatient   Certification Statement: The patient will continue to require additional inpatient hospital stay due to see my assessment and plan  Subjective:   Patient is seen and examined at bedside  Complains of rash which is spread to the buttocks and forearms  Has itching  Afebrile  No other complaints  All other ROS are negative  Objective:    Vitals: Blood pressure 148/70, pulse 65, temperature 97 9 °F (36 6 °C), temperature source Oral, resp  rate 18, height 6' 3" (1 905 m), weight (!) 151 kg (333 lb 12 4 oz), SpO2 93 %  ,Body mass index is 41 72 kg/m²    SPO2 RA Rest      ED to Hosp-Admission (Current) from 7/25/2019 in [facility]   SpO2  93 %   SpO2 Activity  At Rest   O2 Device  None (Room air)   O2 Flow Rate  2 L/min        I&O:     Intake/Output Summary (Last 24 hours) at 8/6/2019 0920  Last data filed at 8/6/2019 0500  Gross per 24 hour   Intake --   Output 2011 ml   Net -2011 ml       Physical Exam:    General- Alert, lying comfortably in bed  Not in any acute distress  HEENT- JENNIE, EOM intact  Neck- Supple, No JVD  CVS- irregular, S1 and S2 normal   Chest- Bilateral Air entry, No rhochi, crackles or wheezing present  Skin-has macular rash on forearms, buttocks and thighs  Abdomen- soft, nontender, not distended, no guarding or rigidity, BS+  Extremities-  trace pedal edema, No calf tenderness  Left AKA  Dressing C/D/I  CNS-   Alert, awake and orientedx3  No focal deficits present      Invasive Devices     Peripherally Inserted Central Catheter Line            PICC Line 01/97/82 Right Basilic 7 days                      Social History  reviewed  Family History   Problem Relation Age of Onset    Other Mother         CARDIAC DISORDER     Diabetes Mother     Cancer Father     Other Family         CARDIAC DISORDER     Diabetes Family     Cancer Family     Mental illness Family     reviewed    Meds:  Current Facility-Administered Medications   Medication Dose Route Frequency Provider Last Rate Last Dose    acetaminophen (TYLENOL) tablet 650 mg  650 mg Oral Q6H PRN Suzette Sánchez PA-C        albuterol (PROVENTIL HFA,VENTOLIN HFA) inhaler 2 puff  2 puff Inhalation Q6H PRN Suzette Sánchez PA-C   2 puff at 07/31/19 0749    atorvastatin (LIPITOR) tablet 20 mg  20 mg Oral Daily Suzette Sánchez PA-C   20 mg at 08/05/19 0836    calcium acetate (PHOSLO) capsule 667 mg  667 mg Oral TID With Meals Harrison Bee MD   667 mg at 08/05/19 1650    clotrimazole (LOTRIMIN) 1 % cream   Topical BID Harrison Bee MD        diphenhydrAMINE (BENADRYL) tablet 25 mg  25 mg Oral Q8H PRN Harrison Bee MD   25 mg at 08/05/19 0716    fluticasone-vilanterol (BREO ELLIPTA) 100-25 mcg/inh inhaler 1 puff  1 puff Inhalation Daily Suzette Sánchez PA-C   1 puff at 08/06/19 0836    heparin (porcine) 25,000 units in 250 mL infusion (premix)  3-20 Units/kg/hr (Order-Specific) Intravenous Titrated Esaw Floss, CRNP 18 9 mL/hr at 08/05/19 2123 21 Units/kg/hr at 08/05/19 2123    heparin (porcine) injection 2,000 Units  2,000 Units Intravenous PRN Esaw Floss, CRNP   2,000 Units at 08/04/19 1449    heparin (porcine) injection 4,000 Units  4,000 Units Intravenous PRN Esaw Floss, CRNP   4,000 Units at 08/04/19 2304    hydrALAZINE (APRESOLINE) injection 10 mg  10 mg Intravenous Q6H PRN Matthew Carr MD        HYDROcodone-acetaminophen (NORCO) 5-325 mg per tablet 2 tablet  2 tablet Oral Q4H PRN Cara Pacheco PA-C   2 tablet at 08/05/19 2321    HYDROmorphone (DILAUDID) injection 0 5 mg  0 5 mg Intravenous Q2H PRN Dalila oMra PA-C   0 5 mg at 08/05/19 2112    insulin glargine (LANTUS) subcutaneous injection 14 Units 0 14 mL  14 Units Subcutaneous HS Suzette Sánchez PA-C   14 Units at 08/05/19 2119    insulin lispro (HumaLOG) 100 units/mL subcutaneous injection 2-12 Units  2-12 Units Subcutaneous TID AC Suzette Sánchez PA-C   4 Units at 08/05/19 1209    insulin lispro (HumaLOG) 100 units/mL subcutaneous injection 30 Units  30 Units Subcutaneous TID With Meals Suzette Sánchez PA-C   30 Units at 08/05/19 1650    metoprolol tartrate (LOPRESSOR) tablet 25 mg  25 mg Oral Q12H [address] Suzette Sánchez PA-C   25 mg at 08/05/19 2119    predniSONE tablet 40 mg  40 mg Oral Daily Matthew Carr MD        torsemide [facility]) tablet 20 mg  20 mg Oral BID (diuretic) Matthew Carr MD   20 mg at 08/05/19 1650    warfarin (COUMADIN) tablet 10 mg  10 mg Oral Daily (warfarin) Matthew Carr MD   10 mg at 08/05/19 1700      Medications Prior to Admission   Medication    albuterol (PROVENTIL HFA,VENTOLIN HFA) 90 mcg/act inhaler    allopurinol (ZYLOPRIM) 300 mg tablet    atorvastatin (LIPITOR) 20 mg tablet    BD INSULIN SYRINGE U/F 31G X 5/16" 0 5 ML MISC    BD PEN NEEDLE HILARIO U/F 32G X 4 MM MISC    fluticasone-salmeterol (ADVAIR DISKUS, WIXELA INHUB) 250-50 mcg/dose inhaler    glucose blood (ONE TOUCH ULTRA TEST) test strip    insulin aspart (NOVOLOG FLEXPEN) 100 Units/mL injection pen    Insulin Pen Needle 31G X 5 MM MISC    losartan (COZAAR) 50 mg tablet    metFORMIN (GLUCOPHAGE) 1000 MG tablet    metoprolol tartrate (LOPRESSOR) 25 mg tablet    torsemide (DEMADEX) 20 mg tablet    warfarin (COUMADIN) 5 mg tablet       Labs:  Results from last 7 days   Lab Units 08/05/19  0541 08/04/19  0546 08/03/19  0537 08/02/19  0623  08/01/19  0457   WBC Thousand/uL 6 51 7 71 8 67 9 42   < > 9 82   HEMOGLOBIN g/dL 8 4* 8 3* 8 2* 8 6*   < > 9 2*   HEMATOCRIT % 28 8* 27 9* 27 9* 28 9*   < > 31 5*   PLATELETS Thousands/uL 332 325 310 265   < > 261   NEUTROS PCT % 57  --   --  66  --  73   LYMPHS PCT % 23  --   --  16  --  12*   LYMPHO PCT %  --  20 18  --   --   --    MONOS PCT % 9  --   --  9  --  8   MONO PCT %  --  4 7  --   --   --    EOS PCT % 5 5 2 3  --  3    < > = values in this interval not displayed  Results from last 7 days   Lab Units 08/06/19  0448 08/05/19  0541 08/04/19  0546   POTASSIUM mmol/L 4 7 4 9 5 0   CHLORIDE mmol/L 102 104 103   CO2 mmol/L 28 24 26   BUN mg/dL 74* 70* 65*   CREATININE mg/dL 1 74* 1 76* 1 86*   CALCIUM mg/dL 8 4 8 6 8 4     Lab Results   Component Value Date    TROPONINI <0 02 03/23/2018    TROPONINI <0 02 10/08/2017    CKTOTAL 53 03/23/2018    CKTOTAL 55 10/08/2017     Results from last 7 days   Lab Units 08/06/19  0448 08/05/19  0541 08/04/19  0546   INR  1 20* 1 14 1 16     Lab Results   Component Value Date    BLOODCX No Growth After 5 Days  07/25/2019    BLOODCX No Growth After 5 Days  07/25/2019    BLOODCX No Growth After 5 Days   12/25/2018    WOUNDCULT 4+ Growth of Proteus mirabilis (A) 07/25/2019    WOUNDCULT 4+ Growth of  07/25/2019    WOUNDCULT (A) 07/25/2019     4+ Growth of Methicillin Resistant Staphylococcus aureus    WOUNDCULT 1+ Growth of Proteus mirabilis (A) 07/25/2019    WOUNDCULT 4+ Growth of  07/25/2019         Imaging:  Results for orders placed during the hospital encounter of 07/25/19   XR chest portable    Narrative CHEST     INDICATION:   chf     COMPARISON:  Chest radiograph 7/29/2019    EXAM PERFORMED/VIEWS:  XR CHEST PORTABLE      FINDINGS:  Right-sided PICC line with tip in the region of the cavoatrial junction  Heart shadow is enlarged but unchanged from prior exam     There is pulmonary vascular congestion  Streaky bibasilar opacities  No large effusion or pneumothorax  Osseous structures appear within normal limits for patient age  Impression 1  Stable enlargement of the cardiac silhouette and pulmonary vascular congestion  2   Bibasilar linear opacities compatible with atelectasis or scarring  Workstation performed: [id number]       Results for orders placed in visit on 05/02/19   XR chest pa & lateral    Narrative CHEST     INDICATION:   J45 40: Moderate persistent asthma, uncomplicated  J50 46: Dyspnea, unspecified  COMPARISON:  One view chest 3/23/2018    EXAM PERFORMED/VIEWS:  XR CHEST PA & LATERAL      FINDINGS:    Cardiac silhouette is enlarged  Aortic calcification is present  Minimal bibasilar subsegmental atelectasis left greater than right  No pneumothorax, or pleural effusion  Mild distention of central pulmonary vessels  Multilevel thoracolumbar spondylosis  Impression Mild central pulmonary vascular congestion  Minimal bibasilar subsegmental atelectasis left greater than right          Workstation performed: [id number]         Last 24 Hours Medication List:     Current Facility-Administered Medications:  acetaminophen 650 mg Oral Q6H PRN Alexsander Sánchez PA-C    albuterol 2 puff Inhalation Q6H PRN Alexsander Sánchez PA-C    atorvastatin 20 mg Oral Daily Suzette Sánchez PA-C    calcium acetate 667 mg Oral TID With Meals Kim Tyler MD    clotrimazole  Topical BID Chata Curiel MD    diphenhydrAMINE 25 mg Oral Q8H PRN Chata Curiel MD    fluticasone-vilanterol 1 puff Inhalation Daily Suzette Sánchez PA-C    heparin (porcine) 3-20 Units/kg/hr (Order-Specific) Intravenous Titrated Laura Dago, CRNP Last Rate: 21 Units/kg/hr (08/05/19 2123)   heparin (porcine) 2,000 Units Intravenous PRN Laura Dago, CRNP    heparin (porcine) 4,000 Units Intravenous PRN Laura Dago, CRNP    hydrALAZINE 10 mg Intravenous Q6H PRN Chata Curiel MD    HYDROcodone-acetaminophen 2 tablet Oral Q4H PRN Dalila Mora PA-C    HYDROmorphone 0 5 mg Intravenous Q2H PRN Dalila Mora PA-C    insulin glargine 14 Units Subcutaneous HS Suzette Sánchez PA-C    insulin lispro 2-12 Units Subcutaneous TID AC Suzette Sánchez PA-C    insulin lispro 30 Units Subcutaneous TID With Meals Colt Sánchez PA-C    metoprolol tartrate 25 mg Oral Q12H [facility] Suzette Sánchez PA-C    predniSONE 40 mg Oral Daily Chata Curiel MD    torsemide 20 mg Oral BID (diuretic) Chata Curiel MD    warfarin 10 mg Oral Daily (warfarin) Chata Curiel MD         Today, Patient Was Seen By: Chata Curiel MD    ** Please Note: Dictation voice to text software may have been used in the creation of this document   **

## 2019-08-07 LAB
ALBUMIN UR ELPH-MCNC: 17.8 %
ALPHA1 GLOB MFR UR ELPH: 14 %
ALPHA2 GLOB MFR UR ELPH: 6.1 %
ANION GAP SERPL CALCULATED.3IONS-SCNC: 12 MMOL/L (ref 4–13)
APTT PPP: 62 SECONDS (ref 23–37)
B-GLOBULIN MFR UR ELPH: 8.1 %
BASOPHILS # BLD AUTO: 0.03 THOUSANDS/ΜL (ref 0–0.1)
BASOPHILS NFR BLD AUTO: 0 % (ref 0–1)
BUN SERPL-MCNC: 78 MG/DL (ref 5–25)
C3 SERPL-MCNC: 133 MG/DL (ref 90–180)
C4 SERPL-MCNC: 43 MG/DL (ref 10–40)
CALCIUM SERPL-MCNC: 9.3 MG/DL (ref 8.3–10.1)
CHLORIDE SERPL-SCNC: 101 MMOL/L (ref 100–108)
CO2 SERPL-SCNC: 26 MMOL/L (ref 21–32)
CREAT SERPL-MCNC: 1.54 MG/DL (ref 0.6–1.3)
CRP SERPL QL: 29.9 MG/L
EOSINOPHIL # BLD AUTO: 0.05 THOUSAND/ΜL (ref 0–0.61)
EOSINOPHIL NFR BLD AUTO: 1 % (ref 0–6)
ERYTHROCYTE [DISTWIDTH] IN BLOOD BY AUTOMATED COUNT: 16.8 % (ref 11.6–15.1)
ERYTHROCYTE [SEDIMENTATION RATE] IN BLOOD: 101 MM/HOUR (ref 0–10)
GAMMA GLOB MFR UR ELPH: 54 %
GFR SERPL CREATININE-BSD FRML MDRD: 46 ML/MIN/1.73SQ M
GLUCOSE SERPL-MCNC: 172 MG/DL (ref 65–140)
GLUCOSE SERPL-MCNC: 183 MG/DL (ref 65–140)
GLUCOSE SERPL-MCNC: 237 MG/DL (ref 65–140)
GLUCOSE SERPL-MCNC: 243 MG/DL (ref 65–140)
GLUCOSE SERPL-MCNC: 265 MG/DL (ref 65–140)
HCT VFR BLD AUTO: 29.8 % (ref 36.5–49.3)
HGB BLD-MCNC: 8.8 G/DL (ref 12–17)
IMM GRANULOCYTES # BLD AUTO: 0.29 THOUSAND/UL (ref 0–0.2)
IMM GRANULOCYTES NFR BLD AUTO: 3 % (ref 0–2)
INR PPP: 1.37 (ref 0.84–1.19)
INTERPRETATION UR IFE-IMP: NORMAL
KAPPA LC FREE SER-MCNC: 286 MG/L (ref 3.3–19.4)
KAPPA LC FREE SER-MCNC: 317.1 MG/L (ref 3.3–19.4)
KAPPA LC FREE/LAMBDA FREE SER: 3.94 {RATIO} (ref 0.26–1.65)
KAPPA LC FREE/LAMBDA FREE SER: 4.68 {RATIO} (ref 0.26–1.65)
LAMBDA LC FREE SERPL-MCNC: 67.8 MG/L (ref 5.7–26.3)
LAMBDA LC FREE SERPL-MCNC: 72.6 MG/L (ref 5.7–26.3)
LYMPHOCYTES # BLD AUTO: 1.32 THOUSANDS/ΜL (ref 0.6–4.47)
LYMPHOCYTES NFR BLD AUTO: 15 % (ref 14–44)
M PROTEIN MFR UR ELPH: 14.27 MG/DL
M PROTEIN UR-MCNC: 23.4 %
MCH RBC QN AUTO: 27 PG (ref 26.8–34.3)
MCHC RBC AUTO-ENTMCNC: 29.5 G/DL (ref 31.4–37.4)
MCV RBC AUTO: 91 FL (ref 82–98)
MONOCYTES # BLD AUTO: 0.53 THOUSAND/ΜL (ref 0.17–1.22)
MONOCYTES NFR BLD AUTO: 6 % (ref 4–12)
NEUTROPHILS # BLD AUTO: 6.59 THOUSANDS/ΜL (ref 1.85–7.62)
NEUTS SEG NFR BLD AUTO: 75 % (ref 43–75)
NRBC BLD AUTO-RTO: 0 /100 WBCS
PHOSPHATE SERPL-MCNC: 5.8 MG/DL (ref 2.3–4.1)
PLATELET # BLD AUTO: 368 THOUSANDS/UL (ref 149–390)
PMV BLD AUTO: 9.9 FL (ref 8.9–12.7)
POTASSIUM SERPL-SCNC: 4.6 MMOL/L (ref 3.5–5.3)
PROT PATTERN UR ELPH-IMP: ABNORMAL
PROT UR-MCNC: 61 MG/DL
PROTHROMBIN TIME: 16.6 SECONDS (ref 11.6–14.5)
RBC # BLD AUTO: 3.26 MILLION/UL (ref 3.88–5.62)
RHEUMATOID FACT SER QL LA: NEGATIVE
SODIUM SERPL-SCNC: 139 MMOL/L (ref 136–145)
WBC # BLD AUTO: 8.81 THOUSAND/UL (ref 4.31–10.16)

## 2019-08-07 PROCEDURE — 99232 SBSQ HOSP IP/OBS MODERATE 35: CPT | Performed by: INTERNAL MEDICINE

## 2019-08-07 PROCEDURE — 82948 REAGENT STRIP/BLOOD GLUCOSE: CPT

## 2019-08-07 PROCEDURE — 84100 ASSAY OF PHOSPHORUS: CPT | Performed by: INTERNAL MEDICINE

## 2019-08-07 PROCEDURE — 94760 N-INVAS EAR/PLS OXIMETRY 1: CPT

## 2019-08-07 PROCEDURE — 94640 AIRWAY INHALATION TREATMENT: CPT

## 2019-08-07 PROCEDURE — 85730 THROMBOPLASTIN TIME PARTIAL: CPT | Performed by: HOSPITALIST

## 2019-08-07 PROCEDURE — 86430 RHEUMATOID FACTOR TEST QUAL: CPT | Performed by: INTERNAL MEDICINE

## 2019-08-07 PROCEDURE — 86038 ANTINUCLEAR ANTIBODIES: CPT | Performed by: INTERNAL MEDICINE

## 2019-08-07 PROCEDURE — 86140 C-REACTIVE PROTEIN: CPT | Performed by: INTERNAL MEDICINE

## 2019-08-07 PROCEDURE — 99222 1ST HOSP IP/OBS MODERATE 55: CPT | Performed by: NURSE PRACTITIONER

## 2019-08-07 PROCEDURE — 85652 RBC SED RATE AUTOMATED: CPT | Performed by: INTERNAL MEDICINE

## 2019-08-07 PROCEDURE — 86225 DNA ANTIBODY NATIVE: CPT | Performed by: INTERNAL MEDICINE

## 2019-08-07 PROCEDURE — 80048 BASIC METABOLIC PNL TOTAL CA: CPT | Performed by: INTERNAL MEDICINE

## 2019-08-07 PROCEDURE — 86160 COMPLEMENT ANTIGEN: CPT | Performed by: INTERNAL MEDICINE

## 2019-08-07 PROCEDURE — 86255 FLUORESCENT ANTIBODY SCREEN: CPT | Performed by: INTERNAL MEDICINE

## 2019-08-07 PROCEDURE — 85025 COMPLETE CBC W/AUTO DIFF WBC: CPT | Performed by: INTERNAL MEDICINE

## 2019-08-07 PROCEDURE — 85610 PROTHROMBIN TIME: CPT | Performed by: INTERNAL MEDICINE

## 2019-08-07 RX ADMIN — INSULIN LISPRO 30 UNITS: 100 INJECTION, SOLUTION INTRAVENOUS; SUBCUTANEOUS at 12:47

## 2019-08-07 RX ADMIN — CLOTRIMAZOLE: 10 CREAM TOPICAL at 08:56

## 2019-08-07 RX ADMIN — TORSEMIDE 20 MG: 20 TABLET ORAL at 17:14

## 2019-08-07 RX ADMIN — TORSEMIDE 20 MG: 20 TABLET ORAL at 08:56

## 2019-08-07 RX ADMIN — WARFARIN SODIUM 10 MG: 10 TABLET ORAL at 17:14

## 2019-08-07 RX ADMIN — INSULIN LISPRO 30 UNITS: 100 INJECTION, SOLUTION INTRAVENOUS; SUBCUTANEOUS at 08:56

## 2019-08-07 RX ADMIN — METOPROLOL TARTRATE 25 MG: 25 TABLET, FILM COATED ORAL at 08:57

## 2019-08-07 RX ADMIN — HYDROCODONE BITARTRATE AND ACETAMINOPHEN 2 TABLET: 5; 325 TABLET ORAL at 10:15

## 2019-08-07 RX ADMIN — HEPARIN SODIUM AND DEXTROSE 21 UNITS/KG/HR: 10000; 5 INJECTION INTRAVENOUS at 00:52

## 2019-08-07 RX ADMIN — INSULIN LISPRO 30 UNITS: 100 INJECTION, SOLUTION INTRAVENOUS; SUBCUTANEOUS at 17:13

## 2019-08-07 RX ADMIN — CLOTRIMAZOLE: 10 CREAM TOPICAL at 17:13

## 2019-08-07 RX ADMIN — METOPROLOL TARTRATE 25 MG: 25 TABLET, FILM COATED ORAL at 22:28

## 2019-08-07 RX ADMIN — HEPARIN SODIUM AND DEXTROSE 21 UNITS/KG/HR: 10000; 5 INJECTION INTRAVENOUS at 15:10

## 2019-08-07 RX ADMIN — FLUTICASONE FUROATE AND VILANTEROL TRIFENATATE 1 PUFF: 100; 25 POWDER RESPIRATORY (INHALATION) at 07:50

## 2019-08-07 RX ADMIN — INSULIN GLARGINE 14 UNITS: 100 INJECTION, SOLUTION SUBCUTANEOUS at 22:28

## 2019-08-07 RX ADMIN — ATORVASTATIN CALCIUM 20 MG: 20 TABLET, FILM COATED ORAL at 08:56

## 2019-08-07 RX ADMIN — HYDROCODONE BITARTRATE AND ACETAMINOPHEN 2 TABLET: 5; 325 TABLET ORAL at 22:41

## 2019-08-07 RX ADMIN — INSULIN LISPRO 4 UNITS: 100 INJECTION, SOLUTION INTRAVENOUS; SUBCUTANEOUS at 12:47

## 2019-08-07 RX ADMIN — PREDNISONE 40 MG: 20 TABLET ORAL at 08:56

## 2019-08-07 RX ADMIN — INSULIN LISPRO 4 UNITS: 100 INJECTION, SOLUTION INTRAVENOUS; SUBCUTANEOUS at 17:14

## 2019-08-07 RX ADMIN — INSULIN LISPRO 2 UNITS: 100 INJECTION, SOLUTION INTRAVENOUS; SUBCUTANEOUS at 08:57

## 2019-08-07 NOTE — PLAN OF CARE
Problem: Potential for Falls  Goal: Patient will remain free of falls  Description  INTERVENTIONS:  - Assess patient frequently for physical needs  -  Identify cognitive and physical deficits and behaviors that affect risk of falls    -  [place] fall precautions as indicated by assessment   - Educate patient/family on patient safety including physical limitations  - Instruct patient to call for assistance with activity based on assessment  - Modify environment to reduce risk of injury  - Consider OT/PT consult to assist with strengthening/mobility  Outcome: Progressing     Problem: Prexisting or High Potential for Compromised Skin Integrity  Goal: Skin integrity is maintained or improved  Description  INTERVENTIONS:  - Identify patients at risk for skin breakdown  - Assess and monitor skin integrity  - Assess and monitor nutrition and hydration status  - Monitor labs (i e  albumin)  - Assess for incontinence   - Turn and reposition patient  - Assist with mobility/ambulation  - Relieve pressure over bony prominences  - Avoid friction and shearing  - Provide appropriate hygiene as needed including keeping skin clean and dry  - Evaluate need for skin moisturizer/barrier cream  - Collaborate with interdisciplinary team (i e  Nutrition, Rehabilitation, etc )   - Patient/family teaching  Outcome: Progressing     Problem: SKIN/TISSUE INTEGRITY - ADULT  Goal: Skin integrity remains intact  Description  INTERVENTIONS  - Identify patients at risk for skin breakdown  - Assess and monitor skin integrity  - Assess and monitor nutrition and hydration status  - Monitor labs (i e  albumin)  - Assess for incontinence   - Turn and reposition patient  - Assist with mobility/ambulation  - Relieve pressure over bony prominences  - Avoid friction and shearing  - Provide appropriate hygiene as needed including keeping skin clean and dry  - Evaluate need for skin moisturizer/barrier cream  - Collaborate with interdisciplinary team (i e  Nutrition, Rehabilitation, etc )   - Patient/family teaching  Outcome: Progressing  Goal: Incision(s), wounds(s) or drain site(s) healing without S/S of infection  Description  INTERVENTIONS  - Assess and document risk factors for skin impairment   - Assess and document dressing, incision, wound bed, drain sites and surrounding tissue  - Initiate Nutrition services consult and/or wound management as needed  Outcome: Progressing     Problem: MUSCULOSKELETAL - ADULT  Goal: Maintain or return mobility to safest level of function  Description  INTERVENTIONS:  - Assess patient's ability to carry out ADLs; assess patient's baseline for ADL function and identify physical deficits which impact ability to perform ADLs (bathing, care of mouth/teeth, toileting, grooming, dressing, etc )  - Assess/evaluate cause of self-care deficits   - Assess range of motion  - Assess patient's mobility; develop plan if impaired  - Assess patient's need for assistive devices and provide as appropriate  - Encourage maximum independence but intervene and supervise when necessary  - Involve family in performance of ADLs  - Assess for home care needs following discharge   - Request OT consult to assist with ADL evaluation and planning for discharge  - Provide patient education as appropriate  Outcome: Progressing     Problem: PAIN - ADULT  Goal: Verbalizes/displays adequate comfort level or baseline comfort level  Description  Interventions:  - Encourage patient to monitor pain and request assistance  - Assess pain using appropriate pain scale  - Administer analgesics based on type and severity of pain and evaluate response  - Implement non-pharmacological measures as appropriate and evaluate response  - Consider cultural and social influences on pain and pain management  - Notify physician/advanced practitioner if interventions unsuccessful or patient reports new pain  Outcome: Progressing     Problem: INFECTION - ADULT  Goal: Absence or prevention of progression during hospitalization  Description  INTERVENTIONS:  - Assess and monitor for signs and symptoms of infection  - Monitor lab/diagnostic results  - Monitor all insertion sites, i e  indwelling lines, tubes, and drains  - Monitor endotracheal (as able) and nasal secretions for changes in amount and color  - [place] appropriate cooling/warming therapies per order  - Administer medications as ordered  - Instruct and encourage patient and family to use good hand hygiene technique  - Identify and instruct in appropriate isolation precautions for identified infection/condition  Outcome: Progressing     Problem: SAFETY ADULT  Goal: Patient will remain free of falls  Description  INTERVENTIONS:  - Assess patient frequently for physical needs  -  Identify cognitive and physical deficits and behaviors that affect risk of falls    -  [place] fall precautions as indicated by assessment   - Educate patient/family on patient safety including physical limitations  - Instruct patient to call for assistance with activity based on assessment  - Modify environment to reduce risk of injury  - Consider OT/PT consult to assist with strengthening/mobility  Outcome: Progressing     Problem: DISCHARGE PLANNING  Goal: Discharge to home or other facility with appropriate resources  Description  INTERVENTIONS:  - Identify barriers to discharge w/patient and caregiver  - Arrange for needed discharge resources and transportation as appropriate  - Identify discharge learning needs (meds, wound care, etc )  - Arrange for interpretive services to assist at discharge as needed  - Refer to Case Management Department for coordinating discharge planning if the patient needs post-hospital services based on physician/advanced practitioner order or complex needs related to functional status, cognitive ability, or social support system  Outcome: Progressing     Problem: Knowledge Deficit  Goal: Patient/family/caregiver demonstrates understanding of disease process, treatment plan, medications, and discharge instructions  Description  Complete learning assessment and assess knowledge base  Interventions:  - Provide teaching at level of understanding  - Provide teaching via preferred learning methods  Outcome: Progressing     Problem: Nutrition/Hydration-ADULT  Goal: Nutrient/Hydration intake appropriate for improving, restoring or maintaining nutritional needs  Description  Monitor and assess patient's nutrition/hydration status for malnutrition (ex- brittle hair, bruises, dry skin, pale skin and conjunctiva, muscle wasting, smooth red tongue, and disorientation)  Collaborate with interdisciplinary team and initiate plan and interventions as ordered  Monitor patient's weight and dietary intake as ordered or per policy  Utilize nutrition screening tool and intervene per policy  Determine patient's food preferences and provide high-protein, high-caloric foods as appropriate       INTERVENTIONS:  - Monitor oral intake, urinary output, labs, and treatment plans  - Assess nutrition and hydration status and recommend course of action  - Evaluate amount of meals eaten  - Assist patient with eating if necessary   - Allow adequate time for meals  - Recommend/ encourage appropriate diets, oral nutritional supplements, and vitamin/mineral supplements  - Order, calculate, and assess calorie counts as needed  - Recommend, monitor, and adjust tube feedings and TPN/PPN based on assessed needs  - Assess need for intravenous fluids  - Provide specific nutrition/hydration education as appropriate  - Include patient/family/caregiver in decisions related to nutrition  Outcome: Progressing

## 2019-08-07 NOTE — PROGRESS NOTES
[address] [facility] NOTE   Shay Martinez 79 y o  male MRN: [id number]  Unit/Bed#: [address] [place] [address] 213- Encounter: [id number]  Reason for Consult: DORA    ASSESSMENT and PLAN:    61-year-old male With a past medical history of AFib, hypertension, CHF, PVD, COPD,who initially presents on 07/25 with left lower extremity wound and pain  There is concern for diabetic foot ulcer and surgery and Podiatry team were brought on board  patient was started on broad-spectrum antibiotics  After evaluation, it was deemed that the patient would need a left lower extremity amputation  Infectious disease team and cardiology teams were also brought on board  Patient underwent AKA on July 3th  Nephrology was consulted on August 4th due to acute on chronic kidney injury     1) DORA on CKD      - stage III CKD baseline 1 25-1 38 mg/dL   - Cr rising to 2 1 mg/dL on 8/3   - improving to 1 9 mg/dL on 8/4 and 1 7 on 8/6/19 --> 1 54 mg/dL on 8/7  - Etiology likely pre renal with diuresis, obstruction ruled out on ultrasound, possibly ATN in the setting of infection/vancomycin with a level of 31 on 08/01   - UA - 8/4 is 0-1 RBC, 204 RBC, trace protein  - Urine Na - 81  - CXR - pulm vasc congestion, cardiac sillhouette enlargement  - PVR  - 0 cc  - renal u/s - no hydro; stable L renal cyst 1 3 cm;   - I/O; avoid nephrotoxic agents  - check BMP in AM  - f/u fec occ  - may need to lower allopurinol if Cr does not continue to improve  - SPEP - IgG Kappa  - UPEP - free kappa light chain  - f/u FLC  - add amlodipine 5 mg if BP remains above 915 systolic    - check LFTs     2) HTN - BP rising   On metoprolol and torsemide     3) electrolytes -      - phos rising 5 8 - recheck in AM;      4) acid/base - stable     5) MBD      - cont holding phos binder for now     6) Anemia      - low iron sat  - check stool occ     7) PVD with LE ulcer - s/p AKA     8) IgG Kappa      - consult Hematology     9) rash - macular/slight papular, non blanching     - started on steroids per Primary team  - unclear etiology  - UA was relatively bland but consider vasculitic rash also  - check serological work up - ordered  - C3 133 - nl  - C4 - 43 - slightly elevated  - CRP - 29 - high  - RF negative  - UA bland  - UPCR 0 37  - rest pending     10) azotemia - awaiting fec occ  Also is on steroids now      SUBJECTIVE / INTERVAL HISTORY:    Pt denies complaints  Denies n/v      OBJECTIVE:  Current Weight: Weight - Scale: (!) 153 kg (337 lb 4 9 oz)  Vitals:    08/06/19 2308 08/07/19 0600 08/07/19 0735 08/07/19 0750   BP: 137/75  156/73    BP Location: Left arm  Left arm    Pulse: 66  60    Resp: 18  19    Temp: 98 2 °F (36 8 °C)  97 5 °F (36 4 °C)    TempSrc: Oral  Oral    SpO2: 93%  93% 94%   Weight:  (!) 153 kg (337 lb 4 9 oz)     Height:           Intake/Output Summary (Last 24 hours) at 8/7/2019 1451  Last data filed at 8/7/2019 0901  Gross per 24 hour   Intake 480 ml   Output 2250 ml   Net -1770 ml     General: NAD  Skin: + rash (see below  Eyes: anicteric sclera  ENT: moist mucous membrane  Neck: supple  Chest: CTA b/l, no ronchii, no wheeze, no rubs, no rales  CVS: s1s2, no murmur, no gallop, no rub  Abdomen: soft, nontender, nl sounds  Extremities: LLE AKA; RLE rash, b/l UE rash, back rash purpuric     : no mehta  Neuro: AAOX3  Psych: normal affect    Medications:    Current Facility-Administered Medications:     acetaminophen (TYLENOL) tablet 650 mg, 650 mg, Oral, Q6H PRN, Gonzales Sánchez PA-C    albuterol (PROVENTIL HFA,VENTOLIN HFA) inhaler 2 puff, 2 puff, Inhalation, Q6H PRN, Gonzales Sánchez PA-C, 2 puff at 07/31/19 0749    atorvastatin (LIPITOR) tablet 20 mg, 20 mg, Oral, Daily, Suzette Sánchez PA-C, 20 mg at 08/07/19 0856    clotrimazole (LOTRIMIN) 1 % cream, , Topical, BID, Sergio Cornell MD    diphenhydrAMINE (BENADRYL) tablet 25 mg, 25 mg, Oral, Q8H PRN, Sergio Cornell MD, 25 mg at 08/05/19 0716    fluticasone-vilanterol (BREO ELLIPTA) 100-25 mcg/inh inhaler 1 puff, 1 puff, Inhalation, Daily, Suzette Sánchez PA-C, 1 puff at 08/07/19 0750    heparin (porcine) 25,000 units in 250 mL infusion (premix), 3-20 Units/kg/hr (Order-Specific), Intravenous, Titrated, NICOLA Carias, Last Rate: 18 9 mL/hr at 08/07/19 0052, 21 Units/kg/hr at 08/07/19 0052    heparin (porcine) injection 2,000 Units, 2,000 Units, Intravenous, PRN, Tory Canedgard, CRNP, 2,000 Units at 08/04/19 1449    heparin (porcine) injection 4,000 Units, 4,000 Units, Intravenous, PRN, Tory Canard, CRNP, 4,000 Units at 08/04/19 2304    hydrALAZINE (APRESOLINE) injection 10 mg, 10 mg, Intravenous, Q6H PRN, Guillaume Gonzalez MD    HYDROcodone-acetaminophen (NORCO) 5-325 mg per tablet 2 tablet, 2 tablet, Oral, Q4H PRN, Frieda Mora PA-C, 2 tablet at 08/07/19 1015    HYDROmorphone (DILAUDID) injection 0 5 mg, 0 5 mg, Intravenous, Q2H PRN, Dalila Mora PA-C, 0 5 mg at 08/05/19 2112    insulin glargine (LANTUS) subcutaneous injection 14 Units 0 14 mL, 14 Units, Subcutaneous, HS, Suzette Sánchez PA-C, 14 Units at 08/06/19 2221    insulin lispro (HumaLOG) 100 units/mL subcutaneous injection 2-12 Units, 2-12 Units, Subcutaneous, TID AC, 4 Units at 08/07/19 1247 **AND** Fingerstick Glucose (POCT), , , TID AC, Suzette Sánchez PA-C    insulin lispro (HumaLOG) 100 units/mL subcutaneous injection 30 Units, 30 Units, Subcutaneous, TID With Meals, Lexie Sánchez PA-C, 30 Units at 08/07/19 1247    metoprolol tartrate (LOPRESSOR) tablet 25 mg, 25 mg, Oral, Q12H [facility], Suzette Sánchez PA-C, 25 mg at 08/07/19 0857    predniSONE tablet 40 mg, 40 mg, Oral, Daily, Guillaume Gonzalez MD, 40 mg at 08/07/19 0856    torsemide (DEMADEX) tablet 20 mg, 20 mg, Oral, BID (diuretic), Guillaume Gonzalez MD, 20 mg at 08/07/19 0856    warfarin (COUMADIN) tablet 10 mg, 10 mg, Oral, Daily (warfarin), Guillaume Gonzalez MD, 10 mg at 08/06/19 1706    Laboratory Results:  Results from last 7 days   Lab Units 08/07/19  0601 08/06/19  0448 08/05/19  0541 08/04/19  0546 08/03/19  2032 08/03/19  0537 08/02/19  0623 08/01/19  1616 08/01/19  0457   WBC Thousand/uL 8 81  --  6 51 7 71  --  8 67 9 42 9 92 9 82   HEMOGLOBIN g/dL 8 8*  --  8 4* 8 3*  --  8 2* 8 6* 9 0* 9 2*   HEMATOCRIT % 29 8*  --  28 8* 27 9*  --  27 9* 28 9* 30 8* 31 5*   PLATELETS Thousands/uL 368  --  332 325  --  310 265 308 261   POTASSIUM mmol/L 4 6 4 7 4 9 5 0 5 1 4 6 4 5  --  5 1   CHLORIDE mmol/L 101 102 104 103 101 103 104  --  105   CO2 mmol/L 26 28 24 26 27 25 28  --  26   BUN mg/dL 78* 74* 70* 65* 63* 54* 45*  --  42*   CREATININE mg/dL 1 54* 1 74* 1 76* 1 86* 2 14* 1 88* 1 60*  --  1 39*   CALCIUM mg/dL 9 3 8 4 8 6 8 4 8 4 8 7 8 3  --  8 8   MAGNESIUM mg/dL  --   --  2 2 2 0  --   --   --   --   --    PHOSPHORUS mg/dL 5 8* 4 6* 5 4* 4 8*  --   --   --   --   --

## 2019-08-07 NOTE — ASSESSMENT & PLAN NOTE
· Creatinine 1 54 today from baseline of 1 2-1 4  · Nephrology follow-up  · Ultrasound renals = No evidence of nephrolithiasis or hydronephrosis  Stable left renal cyst measuring 1 3 cm  Unremarkable bladder    · Bladder scan and PVRs  · Avoid hypotension/NSAIDs

## 2019-08-07 NOTE — ASSESSMENT & PLAN NOTE
· Patient had volume overload post surgery  Was diuresed with IV Lasix initially and switched to torsemide  · Daily weight and I&Os  · Continue on torsemide  · Continue beta-blockers/Aldactone/atorvastatin      Weight (last 2 days)     Date/Time   Weight    08/07/19 0600   (!) 153 (337 3)    08/06/19 0600   (!) 151 (333 78)    08/05/19 0540   (!) 172 (380 07)              Intake/Output Summary (Last 24 hours) at 8/7/2019 1243  Last data filed at 8/7/2019 0901  Gross per 24 hour   Intake 480 ml   Output 2700 ml   Net -2220 ml

## 2019-08-07 NOTE — ASSESSMENT & PLAN NOTE
Lab Results   Component Value Date    HGBA1C 9 4 (H) 12/27/2018       Recent Labs     08/06/19  1613 08/06/19  2113 08/07/19  0738 08/07/19  1100   POCGLU 187* 259* 183* 243*     · 07/31/2019: Left AKA  · Patient has completed the course of IV Vanco/Cefepime as per ID  · Monitor WBC and fever curve  · Continue Novolog/Lantus/sliding scale coverage  · Podiatry, ID general surgery on board  · Continue with current pain regimen

## 2019-08-07 NOTE — SOCIAL WORK
Continue to follow  Updated Meghan Vega in admissions at [facility] on Pt's status  Will follow

## 2019-08-07 NOTE — ASSESSMENT & PLAN NOTE
?  Etiology  Continue on prednisone  Serologic workup was ordered  Continue Benadryl p r n  Discussed with surgery regarding punch skin biopsy  SPEP showed IgG kappa    Outpatient hematology follow-up

## 2019-08-07 NOTE — PROGRESS NOTES
08/07/19 1200   Clinical Encounter Type   Visited With Patient;Patient and family together   Routine Visit Introduction   Patient Spiritual Encounters   Spiritual Assessment 3

## 2019-08-07 NOTE — CONSULTS
Oncology Consult Note  Valerie Contreras 79 y o  male MRN: [id number]  Unit/Bed#: [address] [place] [address] Encounter: [id number]      Presenting Complaint: Biclonal gammopathy with IgG Kappa    History of Presenting Illness: The patient was admitted to the hospital with wound infection  He has a history of DM, HTN, and several other comorbid conditions  He seems to have had a slightly elevated creatinine for about the past year, seems to be slightly increasing more recently  Most recent creat 1 54 Calcium remains WNL  He has also been anemic for quiet some time, most recent Hgb 8 8  Iron studies slightly low  WBC and plt counts WNL  He was found to have biclonal gammopathy with IgG Kappa so we were consulted  The patient states that he feels fine besides the pain at the site of his recent left AK amputation  Denies chest pain, SOB, N/V/D, bleeding/bruising, bone pain, and lymphadenopathy  Review of Systems - As stated in the HPI otherwise the fourteen point review of systems was negative      Past Medical History:   Diagnosis Date    [facility]congestive [facility] failure) [facility]     COPD (chronic obstructive pulmonary disease) ([facility])     Decubitus ulcer of heel     LAST ASSESSED [id number]    Diabetes mellitus ([address] )     History of varicose veins     Hypertension     Intermittent claudication ([facility])     Neuropathy     Seasonal allergies        Social History     Socioeconomic History    Marital status:      Spouse name: None    Number of children: 1    Years of education: None    Highest education level: None   Occupational History    Occupation: RETIRED   Social Needs    Financial resource strain: None    Food insecurity:     Worry: None     Inability: None    Transportation needs:     Medical: None     Non-medical: None   Tobacco Use    Smoking status: Never Smoker    Smokeless tobacco: Never Used   Substance and Sexual Activity    Alcohol use: Not Currently    Drug use: Not Currently    Sexual activity: Not Currently   Lifestyle    Physical activity:     Days per week: None     Minutes per session: None    Stress: None   Relationships    Social connections:     Talks on phone: None     Gets together: None     Attends Catholic service: None     Active member of club or organization: None     Attends meetings of clubs or organizations: None     Relationship status: None    Intimate partner violence:     Fear of current or ex partner: None     Emotionally abused: None     Physically abused: None     Forced sexual activity: None   Other Topics Concern    None   Social History Narrative    DENIED [address]    FEELS SAFE AT HOME    LIVES WITH FAMILY - SISTER    NO LIVING WILL    POA IN EXISTENCE     SUPPORTIVE AND SAFE    One son, 2 granddaughters       Family History   Problem Relation Age of Onset    Other Mother         CARDIAC DISORDER     Diabetes Mother     Cancer Father     Other Family         CARDIAC DISORDER     Diabetes Family     Cancer Family     Mental illness Family        Allergies   Allergen Reactions    Latex Rash         Current Facility-Administered Medications:     acetaminophen (TYLENOL) tablet 650 mg, 650 mg, Oral, Q6H PRN, Tevin Arauz-Raymond PA-C    albuterol (PROVENTIL HFA,VENTOLIN HFA) inhaler 2 puff, 2 puff, Inhalation, Q6H PRN, Suzettejessica Arauz-Raymond PA-C, 2 puff at 07/31/19 0749    atorvastatin (LIPITOR) tablet 20 mg, 20 mg, Oral, Daily, Suzette Astl-Raymond, PA-C, 20 mg at 08/07/19 0856    clotrimazole (LOTRIMIN) 1 % cream, , Topical, BID, Loki Alvarez MD    diphenhydrAMINE (BENADRYL) tablet 25 mg, 25 mg, Oral, Q8H PRN, Loki Alvarez MD, 25 mg at 08/05/19 0716    fluticasone-vilanterol (BREO ELLIPTA) 100-25 mcg/inh inhaler 1 puff, 1 puff, Inhalation, Daily, Suzette Astl-Raymond, PA-C, 1 puff at 08/07/19 0750    heparin (porcine) 25,000 units in 250 mL infusion (premix), 3-20 Units/kg/hr (Order-Specific), Intravenous, Titrated, NICOLA Carias, Last Rate: 18 9 mL/hr at 08/07/19 1510, 21 Units/kg/hr at 08/07/19 1510    heparin (porcine) injection 2,000 Units, 2,000 Units, Intravenous, PRN, NICOLA Catherine, 2,000 Units at 08/04/19 1449    heparin (porcine) injection 4,000 Units, 4,000 Units, Intravenous, PRN, NICOLA Catherine, 4,000 Units at 08/04/19 2304    hydrALAZINE (APRESOLINE) injection 10 mg, 10 mg, Intravenous, Q6H PRN, Laurent Alfred MD    HYDROcodone-acetaminophen (NORCO) 5-325 mg per tablet 2 tablet, 2 tablet, Oral, Q4H PRN, Keo Mora PA-C, 2 tablet at 08/07/19 1015    HYDROmorphone (DILAUDID) injection 0 5 mg, 0 5 mg, Intravenous, Q2H PRN, [place] BLUE Mora PA-C, 0 5 mg at 08/05/19 2112    insulin glargine (LANTUS) subcutaneous injection 14 Units 0 14 mL, 14 Units, Subcutaneous, HS, Suzette Sánchez PA-C, 14 Units at 08/06/19 2221    insulin lispro (HumaLOG) 100 units/mL subcutaneous injection 2-12 Units, 2-12 Units, Subcutaneous, TID AC, 4 Units at 08/07/19 1247 **AND** Fingerstick Glucose (POCT), , , TID AC, Suzette Sánchez PA-C    insulin lispro (HumaLOG) 100 units/mL subcutaneous injection 30 Units, 30 Units, Subcutaneous, TID With Meals, Micky Sánchez PA-C, 30 Units at 08/07/19 1247    metoprolol tartrate (LOPRESSOR) tablet 25 mg, 25 mg, Oral, Q12H [address], Suzette Sánchez PA-C, 25 mg at 08/07/19 0857    predniSONE tablet 40 mg, 40 mg, Oral, Daily, Laurent Alfred MD, 40 mg at 08/07/19 0856    torsemide (DEMADEX) tablet 20 mg, 20 mg, Oral, BID (diuretic), Laurent Alfred MD, 20 mg at 08/07/19 0856    warfarin (COUMADIN) tablet 10 mg, 10 mg, Oral, Daily (warfarin), Laurent Alfred MD, 10 mg at 08/06/19 1706      /87   Pulse 66   Temp 98 °F (36 7 °C) (Oral)   Resp 20   Ht 6' 3" (1 905 m)   Wt (!) 153 kg (337 lb 4 9 oz)   SpO2 92%   BMI 42 16 kg/m²     General Appearance:    Alert, oriented        Eyes:    PERRL   Ears:    Normal external ear canals, both ears   Nose:   Nares normal, septum midline   Throat:   Mucosa moist  Pharynx without injection  Neck:   Supple       Lungs:     Clear to auscultation bilaterally   Chest Wall:    No tenderness or deformity    Heart:    Regular rate and rhythm       Abdomen:     Soft, non-tender, bowel sounds +, no organomegaly           Extremities:   Left AK amputation       Skin:   no rash or icterus      Lymph nodes:   Cervical, supraclavicular, and axillary nodes normal   Neurologic:   CNII-XII intact, normal strength, sensation and reflexes     Throughout               Recent Results (from the past 48 hour(s))   Fingerstick Glucose (POCT)    Collection Time: 08/05/19  4:07 PM   Result Value Ref Range    POC Glucose 149 (H) 65 - 140 mg/dl   Fingerstick Glucose (POCT)    Collection Time: 08/05/19  8:55 PM   Result Value Ref Range    POC Glucose 189 (H) 65 - 140 mg/dl   Protime-INR    Collection Time: 08/06/19  4:48 AM   Result Value Ref Range    Protime 14 9 (H) 11 6 - 14 5 seconds    INR 1 20 (H) 0 84 - 6 47   Basic metabolic panel    Collection Time: 08/06/19  4:48 AM   Result Value Ref Range    Sodium 139 136 - 145 mmol/L    Potassium 4 7 3 5 - 5 3 mmol/L    Chloride 102 100 - 108 mmol/L    CO2 28 21 - 32 mmol/L    ANION GAP 9 4 - 13 mmol/L    BUN 74 (H) 5 - 25 mg/dL    Creatinine 1 74 (H) 0 60 - 1 30 mg/dL    Glucose 157 (H) 65 - 140 mg/dL    Calcium 8 4 8 3 - 10 1 mg/dL    eGFR 40 ml/min/1 73sq m   Phosphorus    Collection Time: 08/06/19  4:48 AM   Result Value Ref Range    Phosphorus 4 6 (H) 2 3 - 4 1 mg/dL   APTT    Collection Time: 08/06/19  4:48 AM   Result Value Ref Range    PTT 66 (H) 23 - 37 seconds   Fingerstick Glucose (POCT)    Collection Time: 08/06/19  7:09 AM   Result Value Ref Range    POC Glucose 162 (H) 65 - 140 mg/dl   Fingerstick Glucose (POCT)    Collection Time: 08/06/19 11:29 AM   Result Value Ref Range POC Glucose 244 (H) 65 - 140 mg/dl   Fingerstick Glucose (POCT)    Collection Time: 08/06/19  4:13 PM   Result Value Ref Range    POC Glucose 187 (H) 65 - 140 mg/dl   UA (URINE) with reflex to Microscopic    Collection Time: 08/06/19  5:00 PM   Result Value Ref Range    Color, UA Yellow     Clarity, UA Clear     Specific [place], UA 1 013 1 003 - 1 030    pH, UA 5 0 4 5, 5 0, 5 5, 6 0, 6 5, 7 0, 7 5, 8 0    Leukocytes, UA Negative Negative    Nitrite, UA Negative Negative    Protein, UA Negative Negative mg/dl    Glucose, UA Negative Negative mg/dl    Ketones, UA Negative Negative mg/dl    Urobilinogen, UA 0 2 0 2, 1 0 E U /dl E U /dl    Bilirubin, UA Negative Negative    Blood, UA Negative Negative   Protein / creatinine ratio, urine    Collection Time: 08/06/19  5:00 PM   Result Value Ref Range    Creatinine, Ur 45 9 mg/dL    Protein Urine Random 17 mg/dL    Prot/Creat Ratio, Ur 0 37 (H) 0 00 - 0 10   Fingerstick Glucose (POCT)    Collection Time: 08/06/19  9:13 PM   Result Value Ref Range    POC Glucose 259 (H) 65 - 140 mg/dl   Basic metabolic panel    Collection Time: 08/07/19  6:01 AM   Result Value Ref Range    Sodium 139 136 - 145 mmol/L    Potassium 4 6 3 5 - 5 3 mmol/L    Chloride 101 100 - 108 mmol/L    CO2 26 21 - 32 mmol/L    ANION GAP 12 4 - 13 mmol/L    BUN 78 (H) 5 - 25 mg/dL    Creatinine 1 54 (H) 0 60 - 1 30 mg/dL    Glucose 172 (H) 65 - 140 mg/dL    Calcium 9 3 8 3 - 10 1 mg/dL    eGFR 46 ml/min/1 73sq m   Phosphorus    Collection Time: 08/07/19  6:01 AM   Result Value Ref Range    Phosphorus 5 8 (H) 2 3 - 4 1 mg/dL   CBC and differential    Collection Time: 08/07/19  6:01 AM   Result Value Ref Range    WBC 8 81 4 31 - 10 16 Thousand/uL    RBC 3 26 (L) 3 88 - 5 62 Million/uL    Hemoglobin 8 8 (L) 12 0 - 17 0 g/dL    Hematocrit 29 8 (L) 36 5 - 49 3 %    MCV 91 82 - 98 fL    MCH 27 0 26 8 - 34 3 pg    MCHC 29 5 (L) 31 4 - 37 4 g/dL    RDW 16 8 (H) 11 6 - 15 1 %    MPV 9 9 8 9 - 12 7 fL    Platelets 368 149 - 390 Thousands/uL    nRBC 0 /100 WBCs    Neutrophils Relative 75 43 - 75 %    Immat GRANS % 3 (H) 0 - 2 %    Lymphocytes Relative 15 14 - 44 %    Monocytes Relative 6 4 - 12 %    Eosinophils Relative 1 0 - 6 %    Basophils Relative 0 0 - 1 %    Neutrophils Absolute 6 59 1 85 - 7 62 Thousands/µL    Immature Grans Absolute 0 29 (H) 0 00 - 0 20 Thousand/uL    Lymphocytes Absolute 1 32 0 60 - 4 47 Thousands/µL    Monocytes Absolute 0 53 0 17 - 1 22 Thousand/µL    Eosinophils Absolute 0 05 0 00 - 0 61 Thousand/µL    Basophils Absolute 0 03 0 00 - 0 10 Thousands/µL   Sedimentation rate, automated    Collection Time: 08/07/19  6:01 AM   Result Value Ref Range    Sed Rate 101 (H) 0 - 10 mm/hour   C-reactive protein    Collection Time: 08/07/19  6:01 AM   Result Value Ref Range    CRP 29 9 (H) <3 0 mg/L   RF Screen w/ Reflex to Titer    Collection Time: 08/07/19  6:01 AM   Result Value Ref Range    Rheumatoid Factor Negative Negative   C3 complement    Collection Time: 08/07/19  6:01 AM   Result Value Ref Range    C3 Complement 133 0 90 0 - 180 0 mg/dL   C4 complement    Collection Time: 08/07/19  6:01 AM   Result Value Ref Range    C4, COMPLEMENT 43 0 (H) 10 0 - 40 0 mg/dL   Protime-INR    Collection Time: 08/07/19  6:18 AM   Result Value Ref Range    Protime 16 6 (H) 11 6 - 14 5 seconds    INR 1 37 (H) 0 84 - 1 19   APTT    Collection Time: 08/07/19  6:18 AM   Result Value Ref Range    PTT 62 (H) 23 - 37 seconds   Fingerstick Glucose (POCT)    Collection Time: 08/07/19  7:38 AM   Result Value Ref Range    POC Glucose 183 (H) 65 - 140 mg/dl   Fingerstick Glucose (POCT)    Collection Time: 08/07/19 11:00 AM   Result Value Ref Range    POC Glucose 243 (H) 65 - 140 mg/dl         Xr Chest Portable    Result Date: 8/4/2019  Narrative: CHEST INDICATION:   chf  COMPARISON:  Chest radiograph 7/29/2019 EXAM PERFORMED/VIEWS:  XR CHEST PORTABLE FINDINGS:  Right-sided PICC line with tip in the region of the cavoatrial junction  Heart shadow is enlarged but unchanged from prior exam  There is pulmonary vascular congestion  Streaky bibasilar opacities  No large effusion or pneumothorax  Osseous structures appear within normal limits for patient age  Impression: 1  Stable enlargement of the cardiac silhouette and pulmonary vascular congestion  2   Bibasilar linear opacities compatible with atelectasis or scarring  Workstation performed: [id number]     Xr Foot 3+ Views Left    Result Date: 7/25/2019  Narrative: LEFT FOOT INDICATION:   suspected osteomyelitis  COMPARISON:  March 18, 2019 VIEWS:  XR FOOT 3+ VW LEFT Images: 3 FINDINGS: Significant soft tissue swelling involving the entire foot, but especially the forefoot and heel  There appears to be shallow ulcer at the plantar aspect of the midfoot  No obvious soft tissue gas to confirm an abscess  Bony structures are demineralized and degenerative  Patient has undergone a previous amputation of the 4th digit at the level of the metatarsal phalangeal joint  Contractures of 2nd digit  Charcot joint in the midfoot appears worse, especially the metatarsal phalangeal joints  There is lateral subluxation of the bases of the 4th and 5th metatarsals in relation to the cuboid, lateral subluxation of the base of the 3rd metatarsal in relation to the 3rd cuneiform with severe degenerative change, and 2nd metatarsal in relation to the 2nd cuneiform also with increasingly severe degenerative changes  Partial lateral degenerative subluxation of base of the 1st metatarsal with 1st cuneiform, slightly worse when compared to prior exam   Increasing widening of the 1st-2nd metatarsal interspace with a developing dystrophic calcification from the lateral proximal diaphysis of the 1st metatarsal towards the 2nd  Severe erosive changes  involving the distal articular surface of the tarsal navicular Large calcaneal spurs at insertion of plantar fascia and Achilles tendon    Reversal of plantar arch   Degenerative changes at the ankle  No focal bony resorption in the hindfoot to suggest osteomyelitis  Impression: Limited exam due to patient's condition  Worsening Charcot joint at midfoot  Worsening soft tissue swelling  Shallow ulceration plantar aspect of the midfoot  Could not radiographically confirm an abscess or osteomyelitis  Recommend MRI or Ceretec bone scan if osteomyelitis is clinically suspected  The study was marked in [facility] for immediate notification  Workstation performed: [id number]     Mri Foot/forefoot Toes Left W Wo Contrast    Result Date: 7/29/2019  Narrative: MRI -LEFT FOOT WITH AND WITHOUT CONTRAST INDICATION:   Osteo  COMPARISON:  7/25/19 x-ray TECHNIQUE:  MR sequences were obtained of the left foot including the following:  Precontrast sequences: localizers, sagittal STIR, sagittal T1, coronal T1, coronal T2 fat sat, axial T2 fat sat, axial PD, coronal T1 fat sat  Postcontrast sequences: Coronal T1 fat sat, sagittal T1 fat sat  IV Contrast:  16 mL of gadobutrol injection (MULTI-DOSE) FINDINGS: Bones: There are neuropathic changes at the Lisfranc articulations  There are edematous changes noted at these articulations  Fracture noted through the cuboid is best seen on axial images 19  Otherwise there is no bone marrow edema or replacement  Muscles and soft tissues: Skin ulceration noted at the plantar soft tissues adjacent to the metatarsal bases  There is extension of this skin defect down to the cuboid, best appreciated on series 12 image 17  No soft tissue collection  Neuropathic myopathy changes with edema and fatty atrophy  Subcutaneous edema throughout the foot IMPRESSION 1  Plantar ulcer extending down to the cuboid  There are Charcot arthropathy changes seen throughout the TMT's, and there is a cuboid fracture  Favor that the bone marrow changes within the cuboid are due to the Charcot changes and fracture, however cannot exclude infection   2   Bone marrow edema throughout the remaining TMT's, consistent with Charcot arthropathy The study was marked in [facility] for immediate notification  Workstation performed: [id number]     Vas Lower Limb Arterial Duplex, Complete Bilateral    Result Date: 7/29/2019  Narrative:  [facility] REPORT CLINICAL: Indications: Patient presents with non-healing, leaking ulcers of his right heel/ankle x several years  Recently he reported a bloody discharge  Operative History: 2015-07-06 Agram-wnl Risk Factors The patient has history of Obesity, HTN, IDDM, A-Fib, CHF, HLD and PAD  FINDINGS:  Segment                Rig       Left                                          PSV  EDV  Impression         PSV  EDV  Common Femoral Artery  120   15                     116   24  Prox Profunda           98    0                      80   14  Prox SFA               117   12                     141   29  Mid SFA                109   12                     153   29  Dist SFA                75    8                     112   33  Proximal Pop           113   12                     143   37  Distal Pop             103   11                     147   43  Dist Post Tibial        62   12  Poorly visualized            Dist  Ant  Tibial      113   12  Not visualized                  CONCLUSION:  Impression: RIGHT LOWER LIMB: Diffuse femoral popliteal artery occlusive disease without significant focal stenosis noted  Ankle/Brachial index: 1 23 which is in the normal range  (Prior 1 00 ) PVR/ PPG tracings are normal  Metatarsal pressure of 230mmHg Great toe pressure of 62mmHg, within the healing range  LEFT LOWER LIMB: Diffuse femoral popliteal artery occlusive disease without significant focal stenosis noted  Evidence suggestive of tib/peroneal occlusive disease; however, unable to evaluate calf vessels due to inadequate image quality secondary to hard dry skin with multiple ulcers/wounds  Ankle/Brachial index: 0 82 which is in the normal range   (Prior 0 86 ) PVR/ PPG tracings are dampened  Metatarsal pressure could not be obtained secondary to poor cuff fitment and pitting edema  Great toe pressure of 67mmHg, within the healing range  Compared to previous study on 12/26/2018, there is no significant change  SIGNATURE: Electronically Signed by: Mari Fleischer on 2019-07-29 02:51:11 PM    Xr Chest Picc Line Portable    Result Date: 7/29/2019  Narrative: CHEST INDICATION:   confirm picc placement  COMPARISON:  5/2/2019 EXAM PERFORMED/VIEWS:  XR CHEST PICC LINE PORTABLE FINDINGS:  New right-sided PICC line terminating in satisfactory position at the cavoatrial junction  Heart shadow is enlarged but unchanged from prior exam  Of vascular congestion  Hernando Glass No pneumothorax or pleural effusion  Osseous structures appear within normal limits for patient age  Impression: Satisfactory right-sided PICC line placement  Cardiomegaly with mild vascular congestion/volume overload  Workstation performed: [id number]     Us Kidney And Bladder    Result Date: 7/30/2019  Narrative: RENAL ULTRASOUND INDICATION: Acute renal failure  COMPARISON: Ultrasound of the abdomen from July 1, 2017 TECHNIQUE:   Ultrasound of the retroperitoneum was performed with a curvilinear transducer utilizing volumetric sweeps and still imaging techniques  FINDINGS: KIDNEYS: Symmetric and normal size  Right kidney:  14 5 x 6 1 cm with cortical thickness of 1 5 cm  Left kidney:  14 6 x 7 0 cm with cortical thickness of 1 3 cm  Right kidney Normal echogenicity and contour  No suspicious masses detected  No hydronephrosis  No shadowing calculi  No perinephric fluid collections  Left kidney Normal echogenicity and contour  No suspicious masses detected  Stable medially located interpolar simple cyst measuring 1 3 cm  No hydronephrosis  No shadowing calculi  No perinephric fluid collections  URETERS: Nonvisualized  BLADDER: Normally distended  No focal thickening or mass lesions  Bilateral ureteral jets detected  Impression: No evidence of nephrolithiasis or hydronephrosis  Stable left renal cyst measuring 1 3 cm  Unremarkable bladder  Workstation performed: [id number]     Radiology Results    Result Date: 8/2/2019  Narrative: Ordered by an unspecified provider  Assessment and Plan:    The patient is a 75yo male with multiple comorbid conditions including DM and HTN  We were consulted to help evaluate recent labs revealing biclonal gammopathies with IgG Kappa  Given that he has slightly elevated kidney function and anemia with the presence of IgG kappa, this warrants a workup for multiple myeloma  I would recommend that he be set up for a IR guided bone marrow biopsy as an outpatient since there is no IR department here  Also, since the patient is anemia and has low iron studies, I would recommend to given the patient Venofer 200mg  I would also recommend for the patient to follow up with us outpatient to follow up on his anemia/slight iron deficiency and also regarding the results to the bone marrow biopsy once it is completed  The patient is agreeable to this

## 2019-08-07 NOTE — PROGRESS NOTES
Progress Note - Walt Singh 1950, 79 y o  male MRN: [id number]    Unit/Bed#: [address] [place] [address] Encounter: [id number]    Primary Care Provider: Zay Del Toro MD   Date and time admitted to hospital: 7/25/2019  1:04 PM        Rash  Assessment & Plan  ? Etiology  Continue on prednisone  Serologic workup was ordered  Continue Benadryl p r n  Discussed with surgery regarding punch skin biopsy  SPEP showed IgG kappa  Outpatient hematology follow-up    Acute renal failure superimposed on stage 3 chronic kidney disease (HCC)  Assessment & Plan  · Creatinine 1 54 today from baseline of 1 2-1 4  · Nephrology follow-up  · Ultrasound renals = No evidence of nephrolithiasis or hydronephrosis  Stable left renal cyst measuring 1 3 cm  Unremarkable bladder  · Bladder scan and PVRs  · Avoid hypotension/NSAIDs    Moderate persistent asthma without complication  Assessment & Plan  · cont albuterol/Advair    Acute on chronic diastolic congestive heart failure ([facility] [address] )  Assessment & Plan  · Patient had volume overload post surgery  Was diuresed with IV Lasix initially and switched to torsemide  · Daily weight and I&Os  · Continue on torsemide  · Continue beta-blockers/Aldactone/atorvastatin  Weight (last 2 days)     Date/Time   Weight    08/07/19 0600   (!) 153 (337 3)    08/06/19 0600   (!) 151 (333 78)    08/05/19 0540   (!) 172 (380 07)              Intake/Output Summary (Last 24 hours) at 8/7/2019 1243  Last data filed at 8/7/2019 0901  Gross per 24 hour   Intake 480 ml   Output 2700 ml   Net -2220 ml       Morbid obesity ([facility] [address] )  Assessment & Plan  · Encourage weight loss    Chronic atrial fibrillation ([facility] [address] )  Assessment & Plan  · Patient has been on heparin drip and Coumadin on 08/01/2019 after discussion with surgery  Overlap until INR is therapeutic  · Continue Lopressor    · Cardiology on board    Essential hypertension  Assessment & Plan  · continue torsemide/ beta-blocker/Aldactone  · Cozaar on hold    Chronic venous stasis dermatitis of both lower extremities  Assessment & Plan  · Chronic, POA  · S/P left AKA  · podiatry/wound care  * Type 2 diabetes mellitus with left diabetic foot ulcer [facility])  Assessment & Plan  Lab Results   Component Value Date    HGBA1C 9 4 (H) 12/27/2018       Recent Labs     08/06/19  1613 08/06/19  2113 08/07/19  0738 08/07/19  1100   POCGLU 187* 259* 183* 243*     · 07/31/2019: Left AKA  · Patient has completed the course of IV Vanco/Cefepime as per ID  · Monitor WBC and fever curve  · Continue Novolog/Lantus/sliding scale coverage  · Podiatry, ID general surgery on board  · Continue with current pain regimen  Labs & Imaging: I have personally reviewed pertinent reports  VTE Pharmacologic Prophylaxis: Heparin and Warfarin (Coumadin)  VTE Mechanical Prophylaxis: sequential compression device    Code Status:   Level 1 - Full Code    Patient Centered Rounds: I have performed bedside rounds with nursing staff today  Discussions with Specialists or Other Care Team Provider:  Surgery    Education and Discussions with Family / Patient:  Wife    Current Length of Stay: 15 day(s)    Current Patient Status: Inpatient   Certification Statement: The patient will continue to require additional inpatient hospital stay due to see my assessment and plan  Subjective:   Patient is seen and examined at bedside  No new complaints  Still complains of a rash which is now spread to lower extremity  Rash on the forearm and abdomen has improved  Afebrile  Has been on no new medication  All other ROS are negative  Objective:    Vitals: Blood pressure 156/73, pulse 60, temperature 97 5 °F (36 4 °C), temperature source Oral, resp  rate 19, height 6' 3" (1 905 m), weight (!) 153 kg (337 lb 4 9 oz), SpO2 94 %  ,Body mass index is 42 16 kg/m²    SPO2 RA Rest      ED to Hosp-Admission (Current) from 7/25/2019 in [facility] S[facility]   SpO2  94 %   SpO2 Activity  At Rest   O2 Device  None (Room air)   O2 Flow Rate  2 L/min        I&O:     Intake/Output Summary (Last 24 hours) at 8/7/2019 1246  Last data filed at 8/7/2019 0901  Gross per 24 hour   Intake 480 ml   Output 2700 ml   Net -2220 ml       Physical Exam:    General- Alert, lying comfortably in bed  Not in any acute distress  HEENT- JENNIE, EOM intact  Neck- Supple, No JVD  CVS- regular, S1 and S2 normal   Chest- Bilateral Air entry, No rhochi, crackles or wheezing present  Abdomen- soft, nontender, not distended, no guarding or rigidity, BS+  Extremities-  left AKA-dressing C/I/D  Skin-macular rash on forearm, buttocks, lower extremity  CNS-   Alert, awake and orientedx3  No focal deficits present      Invasive Devices     Peripherally Inserted Central Catheter Line            PICC Line 32/39/95 Right Basilic 8 days                      Social History  reviewed  Family History   Problem Relation Age of Onset    Other Mother         CARDIAC DISORDER     Diabetes Mother     Cancer Father     Other Family         CARDIAC DISORDER     Diabetes Family     Cancer Family     Mental illness Family     reviewed    Meds:  Current Facility-Administered Medications   Medication Dose Route Frequency Provider Last Rate Last Dose    acetaminophen (TYLENOL) tablet 650 mg  650 mg Oral Q6H PRN Suzette Sánchez PA-C        albuterol (PROVENTIL HFA,VENTOLIN HFA) inhaler 2 puff  2 puff Inhalation Q6H PRN SUKI CortésC   2 puff at 07/31/19 0749    atorvastatin (LIPITOR) tablet 20 mg  20 mg Oral Daily Suzette Sánchez PA-C   20 mg at 08/07/19 0856    clotrimazole (LOTRIMIN) 1 % cream   Topical BID Yari Avendaño MD        diphenhydrAMINE (BENADRYL) tablet 25 mg  25 mg Oral Q8H PRN Yari Avendaño MD   25 mg at 08/05/19 0716    fluticasone-vilanterol (BREO ELLIPTA) 100-25 mcg/inh inhaler 1 puff  1 puff Inhalation Daily Suzette Sánchez PA-C   1 puff at 08/07/19 0750    heparin (porcine) 25,000 units in 250 mL infusion (premix)  3-20 Units/kg/hr (Order-Specific) Intravenous Titrated Colonel Bone, CRNP 18 9 mL/hr at 08/07/19 0052 21 Units/kg/hr at 08/07/19 0052    heparin (porcine) injection 2,000 Units  2,000 Units Intravenous PRN Colonel Bone, CRNP   2,000 Units at 08/04/19 1449    heparin (porcine) injection 4,000 Units  4,000 Units Intravenous PRN Colonel Bone, CRNP   4,000 Units at 08/04/19 2304    hydrALAZINE (APRESOLINE) injection 10 mg  10 mg Intravenous Q6H PRN Dulce Guillen MD        HYDROcodone-acetaminophen (NORCO) 5-325 mg per tablet 2 tablet  2 tablet Oral Q4H PRN Sophie Hickey PA-C   2 tablet at 08/07/19 1015    HYDROmorphone (DILAUDID) injection 0 5 mg  0 5 mg Intravenous Q2H PRN Dalila Mora PA-C   0 5 mg at 08/05/19 2112    insulin glargine (LANTUS) subcutaneous injection 14 Units 0 14 mL  14 Units Subcutaneous HS Suzette Sánchez PA-C   14 Units at 08/06/19 2221    insulin lispro (HumaLOG) 100 units/mL subcutaneous injection 2-12 Units  2-12 Units Subcutaneous TID AC Suzette Sánchez PA-C   2 Units at 08/07/19 0857    insulin lispro (HumaLOG) 100 units/mL subcutaneous injection 30 Units  30 Units Subcutaneous TID With Meals Suzette Sánchez PA-C   30 Units at 08/07/19 0856    metoprolol tartrate (LOPRESSOR) tablet 25 mg  25 mg Oral Q12H [facility] Suzette Sánchez PA-C   25 mg at 08/07/19 0857    predniSONE tablet 40 mg  40 mg Oral Daily Dulce Guillen MD   40 mg at 08/07/19 0856    torsemide (DEMADEX) tablet 20 mg  20 mg Oral BID (diuretic) Dulce Guillen MD   20 mg at 08/07/19 0856    warfarin (COUMADIN) tablet 10 mg  10 mg Oral Daily (warfarin) Dulce Guillen MD   10 mg at 08/06/19 1706      Medications Prior to Admission   Medication    albuterol (PROVENTIL HFA,VENTOLIN HFA) 90 mcg/act inhaler    allopurinol (ZYLOPRIM) 300 mg tablet    atorvastatin (LIPITOR) 20 mg tablet    BD INSULIN SYRINGE U/F 31G X 5/16" 0 5 ML MISC    BD PEN NEEDLE HILARIO U/F 32G X 4 MM MISC    fluticasone-salmeterol (ADVAIR DISKUS, WIXELA INHUB) 250-50 mcg/dose inhaler    glucose blood (ONE TOUCH ULTRA TEST) test strip    insulin aspart (NOVOLOG FLEXPEN) 100 Units/mL injection pen    Insulin Pen Needle 31G X 5 MM MISC    losartan (COZAAR) 50 mg tablet    metFORMIN (GLUCOPHAGE) 1000 MG tablet    metoprolol tartrate (LOPRESSOR) 25 mg tablet    torsemide (DEMADEX) 20 mg tablet    warfarin (COUMADIN) 5 mg tablet       Labs:  Results from last 7 days   Lab Units 08/07/19  0601 08/05/19  0541 08/04/19  0546  08/02/19  0623   WBC Thousand/uL 8 81 6 51 7 71   < > 9 42   HEMOGLOBIN g/dL 8 8* 8 4* 8 3*   < > 8 6*   HEMATOCRIT % 29 8* 28 8* 27 9*   < > 28 9*   PLATELETS Thousands/uL 368 332 325   < > 265   NEUTROS PCT % 75 57  --   --  66   LYMPHS PCT % 15 23  --   --  16   LYMPHO PCT %  --   --  20   < >  --    MONOS PCT % 6 9  --   --  9   MONO PCT %  --   --  4   < >  --    EOS PCT % 1 5 5   < > 3    < > = values in this interval not displayed  Results from last 7 days   Lab Units 08/07/19  0601 08/06/19  0448 08/05/19  0541   POTASSIUM mmol/L 4 6 4 7 4 9   CHLORIDE mmol/L 101 102 104   CO2 mmol/L 26 28 24   BUN mg/dL 78* 74* 70*   CREATININE mg/dL 1 54* 1 74* 1 76*   CALCIUM mg/dL 9 3 8 4 8 6     Lab Results   Component Value Date    TROPONINI <0 02 03/23/2018    TROPONINI <0 02 10/08/2017    CKTOTAL 53 03/23/2018    CKTOTAL 55 10/08/2017     Results from last 7 days   Lab Units 08/07/19  0618 08/06/19  0448 08/05/19  0541   INR  1 37* 1 20* 1 14     Lab Results   Component Value Date    BLOODCX No Growth After 5 Days  07/25/2019    BLOODCX No Growth After 5 Days  07/25/2019    BLOODCX No Growth After 5 Days   12/25/2018    WOUNDCULT 4+ Growth of Proteus mirabilis (A) 07/25/2019    WOUNDCULT 4+ Growth of  07/25/2019    WOUNDCULT (A) 07/25/2019     4+ Growth of Methicillin Resistant Staphylococcus aureus    WOUNDCULT 1+ Growth of Proteus mirabilis (A) 07/25/2019    WOUNDCULT 4+ Growth of  07/25/2019         Imaging:  Results for orders placed during the hospital encounter of 07/25/19   XR chest portable    Narrative CHEST     INDICATION:   chf     COMPARISON:  Chest radiograph 7/29/2019    EXAM PERFORMED/VIEWS:  XR CHEST PORTABLE      FINDINGS:  Right-sided PICC line with tip in the region of the cavoatrial junction  Heart shadow is enlarged but unchanged from prior exam     There is pulmonary vascular congestion  Streaky bibasilar opacities  No large effusion or pneumothorax  Osseous structures appear within normal limits for patient age  Impression 1  Stable enlargement of the cardiac silhouette and pulmonary vascular congestion  2   Bibasilar linear opacities compatible with atelectasis or scarring  Workstation performed: [id number]       Results for orders placed in visit on 05/02/19   XR chest pa & lateral    Narrative CHEST     INDICATION:   J45 40: Moderate persistent asthma, uncomplicated  K65 27: Dyspnea, unspecified  COMPARISON:  One view chest 3/23/2018    EXAM PERFORMED/VIEWS:  XR CHEST PA & LATERAL      FINDINGS:    Cardiac silhouette is enlarged  Aortic calcification is present  Minimal bibasilar subsegmental atelectasis left greater than right  No pneumothorax, or pleural effusion  Mild distention of central pulmonary vessels  Multilevel thoracolumbar spondylosis  Impression Mild central pulmonary vascular congestion  Minimal bibasilar subsegmental atelectasis left greater than right          Workstation performed: [id number]         Last 24 Hours Medication List:     Current Facility-Administered Medications:  acetaminophen 650 mg Oral Q6H PRN Sera Sánchez PA-C    albuterol 2 puff Inhalation Q6H PRN Sera Sánchez PA-C    atorvastatin 20 mg Oral Daily Suzette Sánchez PA-C    clotrimazole  Topical BID Lupe Henry MD    diphenhydrAMINE 25 mg Oral Q8H PRN Lpue Henry MD fluticasone-vilanterol 1 puff Inhalation Daily LESLEY Cortés-C    heparin (porcine) 3-20 Units/kg/hr (Order-Specific) Intravenous Titrated Thereasa Aus, CRNP Last Rate: 21 Units/kg/hr (08/07/19 0052)   heparin (porcine) 2,000 Units Intravenous PRN Thereasa Aus, CRNP    heparin (porcine) 4,000 Units Intravenous PRN Thereasa Aus, CRNP    hydrALAZINE 10 mg Intravenous Q6H PRN Leisa Garcia MD    HYDROcodone-acetaminophen 2 tablet Oral Q4H PRN Dalila Mora PA-C    HYDROmorphone 0 5 mg Intravenous Q2H PRN Dalila Mora PA-C    insulin glargine 14 Units Subcutaneous HS LESLEY Cortés-SHANEKA    insulin lispro 2-12 Units Subcutaneous TID AC LESLEY Cortés-C    insulin lispro 30 Units Subcutaneous TID With Meals Juan C Sánchez PA-C    metoprolol tartrate 25 mg Oral Q12H [address] Suzette Sánchez PA-C    predniSONE 40 mg Oral Daily Leisa Garcia MD    torsemide 20 mg Oral BID (diuretic) Leisa Garcia MD    warfarin 10 mg Oral Daily (warfarin) Leisa Garcia MD         Today, Patient Was Seen By: Leisa Garcia MD    ** Please Note: Dictation voice to text software may have been used in the creation of this document   **

## 2019-08-07 NOTE — PROGRESS NOTES
08/07/19 [address] Involvement Patient active with Anabaptist;Gnosticism active in support; Other (Comment)  (HIs  has visited several times )   Gnosticism Leader Aware/Contacted Leader/Anabaptist aware of patient's status   Spiritual Beliefs/Perceptions   Concept of God Accepting   Relationship with God Close   Spiritual Strengths Connection w/ local Congregational as supportive, meaningful  Stress Factors   Patient Stress Factors Health changes; Other (Comment)  (Patient reported needing to find new living arrangement )   Plan of Care   Assessment Completed by: Unit visit

## 2019-08-08 ENCOUNTER — APPOINTMENT (INPATIENT)
Dept: RADIOLOGY | Facility: HOSPITAL | Age: 69
DRG: 616 | End: 2019-08-08
Payer: COMMERCIAL

## 2019-08-08 PROBLEM — D47.2 BICLONAL GAMMOPATHY: Status: ACTIVE | Noted: 2019-08-08

## 2019-08-08 LAB
ALBUMIN SERPL BCP-MCNC: 2.6 G/DL (ref 3.5–5)
ALP SERPL-CCNC: 150 U/L (ref 46–116)
ALT SERPL W P-5'-P-CCNC: 19 U/L (ref 12–78)
ANION GAP SERPL CALCULATED.3IONS-SCNC: 12 MMOL/L (ref 4–13)
APTT PPP: 47 SECONDS (ref 23–37)
APTT PPP: 48 SECONDS (ref 23–37)
AST SERPL W P-5'-P-CCNC: 21 U/L (ref 5–45)
BILIRUB DIRECT SERPL-MCNC: 0.2 MG/DL (ref 0–0.2)
BILIRUB SERPL-MCNC: 0.4 MG/DL (ref 0.2–1)
BUN SERPL-MCNC: 81 MG/DL (ref 5–25)
CALCIUM SERPL-MCNC: 8.8 MG/DL (ref 8.3–10.1)
CHLORIDE SERPL-SCNC: 100 MMOL/L (ref 100–108)
CO2 SERPL-SCNC: 27 MMOL/L (ref 21–32)
CREAT SERPL-MCNC: 1.71 MG/DL (ref 0.6–1.3)
DSDNA AB SER-ACNC: <1 IU/ML (ref 0–9)
GFR SERPL CREATININE-BSD FRML MDRD: 41 ML/MIN/1.73SQ M
GLUCOSE SERPL-MCNC: 184 MG/DL (ref 65–140)
GLUCOSE SERPL-MCNC: 204 MG/DL (ref 65–140)
GLUCOSE SERPL-MCNC: 208 MG/DL (ref 65–140)
GLUCOSE SERPL-MCNC: 229 MG/DL (ref 65–140)
GLUCOSE SERPL-MCNC: 320 MG/DL (ref 65–140)
IGA SERPL-MCNC: 1090 MG/DL (ref 70–400)
IGG SERPL-MCNC: 1190 MG/DL (ref 700–1600)
IGM SERPL-MCNC: 12 MG/DL (ref 40–230)
INR PPP: 1.58 (ref 0.84–1.19)
PHOSPHATE SERPL-MCNC: 5.2 MG/DL (ref 2.3–4.1)
POTASSIUM SERPL-SCNC: 4.5 MMOL/L (ref 3.5–5.3)
PROT SERPL-MCNC: 7.8 G/DL (ref 6.4–8.2)
PROTHROMBIN TIME: 18.5 SECONDS (ref 11.6–14.5)
SODIUM SERPL-SCNC: 139 MMOL/L (ref 136–145)

## 2019-08-08 PROCEDURE — 85730 THROMBOPLASTIN TIME PARTIAL: CPT | Performed by: NURSE PRACTITIONER

## 2019-08-08 PROCEDURE — 82784 ASSAY IGA/IGD/IGG/IGM EACH: CPT | Performed by: INTERNAL MEDICINE

## 2019-08-08 PROCEDURE — 85610 PROTHROMBIN TIME: CPT | Performed by: INTERNAL MEDICINE

## 2019-08-08 PROCEDURE — 85730 THROMBOPLASTIN TIME PARTIAL: CPT | Performed by: INTERNAL MEDICINE

## 2019-08-08 PROCEDURE — 84100 ASSAY OF PHOSPHORUS: CPT | Performed by: INTERNAL MEDICINE

## 2019-08-08 PROCEDURE — 82948 REAGENT STRIP/BLOOD GLUCOSE: CPT

## 2019-08-08 PROCEDURE — 99024 POSTOP FOLLOW-UP VISIT: CPT | Performed by: PHYSICIAN ASSISTANT

## 2019-08-08 PROCEDURE — 80048 BASIC METABOLIC PNL TOTAL CA: CPT | Performed by: INTERNAL MEDICINE

## 2019-08-08 PROCEDURE — 97530 THERAPEUTIC ACTIVITIES: CPT

## 2019-08-08 PROCEDURE — 82232 ASSAY OF BETA-2 PROTEIN: CPT | Performed by: INTERNAL MEDICINE

## 2019-08-08 PROCEDURE — 80076 HEPATIC FUNCTION PANEL: CPT | Performed by: INTERNAL MEDICINE

## 2019-08-08 PROCEDURE — 99232 SBSQ HOSP IP/OBS MODERATE 35: CPT | Performed by: INTERNAL MEDICINE

## 2019-08-08 PROCEDURE — 97110 THERAPEUTIC EXERCISES: CPT

## 2019-08-08 PROCEDURE — 94640 AIRWAY INHALATION TREATMENT: CPT

## 2019-08-08 PROCEDURE — 77075 RADEX OSSEOUS SURVEY COMPL: CPT

## 2019-08-08 PROCEDURE — 94760 N-INVAS EAR/PLS OXIMETRY 1: CPT

## 2019-08-08 RX ORDER — INSULIN GLARGINE 100 [IU]/ML
7 INJECTION, SOLUTION SUBCUTANEOUS
Status: DISCONTINUED | OUTPATIENT
Start: 2019-08-08 | End: 2019-08-10

## 2019-08-08 RX ORDER — DEXTROSE AND SODIUM CHLORIDE 5; .9 G/100ML; G/100ML
50 INJECTION, SOLUTION INTRAVENOUS CONTINUOUS
Status: DISCONTINUED | OUTPATIENT
Start: 2019-08-08 | End: 2019-08-10

## 2019-08-08 RX ORDER — WARFARIN SODIUM 10 MG/1
10 TABLET ORAL
Status: DISCONTINUED | OUTPATIENT
Start: 2019-08-08 | End: 2019-08-09

## 2019-08-08 RX ADMIN — INSULIN LISPRO 30 UNITS: 100 INJECTION, SOLUTION INTRAVENOUS; SUBCUTANEOUS at 13:27

## 2019-08-08 RX ADMIN — METOPROLOL TARTRATE 25 MG: 25 TABLET, FILM COATED ORAL at 08:30

## 2019-08-08 RX ADMIN — HEPARIN SODIUM AND DEXTROSE 21 UNITS/KG/HR: 10000; 5 INJECTION INTRAVENOUS at 04:03

## 2019-08-08 RX ADMIN — HEPARIN SODIUM AND DEXTROSE 21 UNITS/KG/HR: 10000; 5 INJECTION INTRAVENOUS at 19:26

## 2019-08-08 RX ADMIN — PREDNISONE 40 MG: 20 TABLET ORAL at 08:30

## 2019-08-08 RX ADMIN — TORSEMIDE 20 MG: 20 TABLET ORAL at 08:30

## 2019-08-08 RX ADMIN — HYDROCODONE BITARTRATE AND ACETAMINOPHEN 2 TABLET: 5; 325 TABLET ORAL at 11:36

## 2019-08-08 RX ADMIN — CLOTRIMAZOLE: 10 CREAM TOPICAL at 18:14

## 2019-08-08 RX ADMIN — TORSEMIDE 20 MG: 20 TABLET ORAL at 16:13

## 2019-08-08 RX ADMIN — ATORVASTATIN CALCIUM 20 MG: 20 TABLET, FILM COATED ORAL at 08:28

## 2019-08-08 RX ADMIN — INSULIN LISPRO 30 UNITS: 100 INJECTION, SOLUTION INTRAVENOUS; SUBCUTANEOUS at 08:29

## 2019-08-08 RX ADMIN — INSULIN LISPRO 2 UNITS: 100 INJECTION, SOLUTION INTRAVENOUS; SUBCUTANEOUS at 13:28

## 2019-08-08 RX ADMIN — METOPROLOL TARTRATE 25 MG: 25 TABLET, FILM COATED ORAL at 20:53

## 2019-08-08 RX ADMIN — INSULIN LISPRO 8 UNITS: 100 INJECTION, SOLUTION INTRAVENOUS; SUBCUTANEOUS at 16:23

## 2019-08-08 RX ADMIN — INSULIN GLARGINE 7 UNITS: 100 INJECTION, SOLUTION SUBCUTANEOUS at 22:30

## 2019-08-08 RX ADMIN — FLUTICASONE FUROATE AND VILANTEROL TRIFENATATE 1 PUFF: 100; 25 POWDER RESPIRATORY (INHALATION) at 08:35

## 2019-08-08 RX ADMIN — CLOTRIMAZOLE: 10 CREAM TOPICAL at 08:28

## 2019-08-08 RX ADMIN — INSULIN LISPRO 4 UNITS: 100 INJECTION, SOLUTION INTRAVENOUS; SUBCUTANEOUS at 08:29

## 2019-08-08 RX ADMIN — WARFARIN SODIUM 10 MG: 10 TABLET ORAL at 18:13

## 2019-08-08 RX ADMIN — INSULIN LISPRO 30 UNITS: 100 INJECTION, SOLUTION INTRAVENOUS; SUBCUTANEOUS at 16:24

## 2019-08-08 RX ADMIN — IRON SUCROSE 200 MG: 20 INJECTION, SOLUTION INTRAVENOUS at 13:31

## 2019-08-08 NOTE — PROGRESS NOTES
[address] [facility] NOTE   Yasir Santamaria 79 y o  male MRN: [id number]  Unit/Bed#: [address] [place] [address] [id number] Encounter: [id number]  Reason for Consult: AKIi on CKD    ASSESSMENT and PLAN:    81-year-old male With a past medical history of AFib, hypertension, CHF, PVD, COPD,who initially presents on 07/25 with left lower extremity wound and pain  There is concern for diabetic foot ulcer and surgery and Podiatry team were brought on board  patient was started on broad-spectrum antibiotics  After evaluation, it was deemed that the patient would need a left lower extremity amputation  Infectious disease team and cardiology teams were also brought on board  Patient underwent AKA on July 3th  Nephrology was consulted on August 4th due to acute on chronic kidney injury     1) DORA on CKD      - stage III CKD baseline 1 25-1 38 mg/dL   - Cr rising to 2 1 mg/dL on 8/3   - improving to 1 9 mg/dL on 8/4 and 1 7 on 8/6/19 --> 1 54 mg/dL on 8/7 --> 1 7 on 8/8/19  - Etiology likely pre renal with diuresis, obstruction ruled out on ultrasound, possibly ATN in the setting of infection/vancomycin with a level of 31 on 08/01   - UA - 8/4 is 0-1 RBC, 204 RBC, trace protein  - Urine Na - 81  - CXR - pulm vasc congestion, cardiac sillhouette enlargement  - PVR  - 0 cc  - renal u/s - no hydro; stable L renal cyst 1 3 cm;   - I/O; avoid nephrotoxic agents  - check BMP in AM  - f/u fec occ  - add amlodipine 5 mg if BP remains above 737 systolic       2) HTN - BP stable   On metoprolol and torsemide     3) electrolytes -      - phos rising 5 8, but improved 5 2   - recheck in AM;      4) acid/base - stable     5) MBD      - cont holding phos binder for now --> check phos in AM  If rising, restart phos binder     6) Anemia      - low iron sat  - check stool occ     7) PVD with LE ulcer - s/p AKA     8) IgG Kappa      - Hematology on board  - bone scan pending  - - FLC - elevated ratio 4 68 (kappa 317/lamda 68)  - biopsy of bone marrow pending  - alk phos elevated  F/u bone scan     9) rash - macular/slight papular, non blanching     - started on steroids per Primary team  - unclear etiology  - UA was relatively bland but consider vasculitic rash also  - check serological work up - ordered  - C3 133 - nl  - C4 - 43 - slightly elevated  - CRP - 29 - high  - RF negative  - UA bland  - UPCR 0 37  - COLLEEN - negative  - rest pending     10) azotemia - awaiting fec occ  Also is on steroids now       SUBJECTIVE / INTERVAL HISTORY:  Pt denies complaints  No SOB  No n/v      OBJECTIVE:  Current Weight: Weight - Scale: (!) 152 kg (335 lb 5 1 oz)  Vitals:    08/07/19 2228 08/08/19 0600 08/08/19 0748 08/08/19 0835   BP: 138/72  153/66    BP Location:   Left arm    Pulse: 77  61    Resp:   17    Temp:   (!) 97 4 °F (36 3 °C)    TempSrc:   Oral    SpO2:   91% 92%   Weight:  (!) 152 kg (335 lb 5 1 oz)     Height:           Intake/Output Summary (Last 24 hours) at 8/8/2019 1433  Last data filed at 8/8/2019 1201  Gross per 24 hour   Intake --   Output 3325 ml   Net -3325 ml     General: NAD  Skin: no rash  Eyes: anicteric sclera  ENT: moist mucous membrane  Neck: supple  Chest: CTA b/l, no ronchii, no wheeze, no rubs, no rales  CVS: s1s2, no murmur, no gallop, no rub  Abdomen: soft, nontender, nl sounds  Extremities: no edema RLE   LLE amputation  : no mehta  Neuro: AAOX3  Psych: normal affect    Medications:    Current Facility-Administered Medications:     acetaminophen (TYLENOL) tablet 650 mg, 650 mg, Oral, Q6H PRN, Gladystine Kill JOEY Sánchez    albuterol (PROVENTIL HFA,VENTOLIN HFA) inhaler 2 puff, 2 puff, Inhalation, Q6H PRN, Gladystine Kill JOEY Sánchez, 2 puff at 07/31/19 0749    atorvastatin (LIPITOR) tablet 20 mg, 20 mg, Oral, Daily, Suzette Sánchez PA-C, 20 mg at 08/08/19 4451    clotrimazole (LOTRIMIN) 1 % cream, , Topical, BID, Shawn Zayas MD    diphenhydrAMINE (BENADRYL) tablet 25 mg, 25 mg, Oral, Q8H PRN, Shawn Zayas MD, 25 mg at 08/05/19 0716    fluticasone-vilanterol (BREO ELLIPTA) 100-25 mcg/inh inhaler 1 puff, 1 puff, Inhalation, Daily, Suzette Sánchez PA-C, 1 puff at 08/08/19 0835    heparin (porcine) 25,000 units in 250 mL infusion (premix), 3-20 Units/kg/hr (Order-Specific), Intravenous, Titrated, NICOLA Carias, Last Rate: 18 9 mL/hr at 08/08/19 0403, 21 Units/kg/hr at 08/08/19 0403    heparin (porcine) injection 2,000 Units, 2,000 Units, Intravenous, PRN, NICOLA Mosley, 2,000 Units at 08/04/19 1449    heparin (porcine) injection 4,000 Units, 4,000 Units, Intravenous, PRN, NICOLA Mosley, 4,000 Units at 08/04/19 2304    hydrALAZINE (APRESOLINE) injection 10 mg, 10 mg, Intravenous, Q6H PRN, Beba Galvan MD    HYDROcodone-acetaminophen (NORCO) 5-325 mg per tablet 2 tablet, 2 tablet, Oral, Q4H PRN, Walter Mora PA-C, 2 tablet at 08/08/19 1136    HYDROmorphone (DILAUDID) injection 0 5 mg, 0 5 mg, Intravenous, Q2H PRN, Dalila Mora PA-C, 0 5 mg at 08/05/19 2112    insulin glargine (LANTUS) subcutaneous injection 14 Units 0 14 mL, 14 Units, Subcutaneous, HS, Suzette Sánchez PA-C, 14 Units at 08/07/19 2228    insulin lispro (HumaLOG) 100 units/mL subcutaneous injection 2-12 Units, 2-12 Units, Subcutaneous, TID AC, 2 Units at 08/08/19 1328 **AND** Fingerstick Glucose (POCT), , , TID AC, Suzette Sánchez PA-C    insulin lispro (HumaLOG) 100 units/mL subcutaneous injection 30 Units, 30 Units, Subcutaneous, TID With Meals, Hannahay Sánchez PA-C, 30 Units at 08/08/19 1327    metoprolol tartrate (LOPRESSOR) tablet 25 mg, 25 mg, Oral, Q12H [address], Suzette Sánchez PA-C, 25 mg at 08/08/19 0830    predniSONE tablet 40 mg, 40 mg, Oral, Daily, Beba Galvan MD, 40 mg at 08/08/19 0830    torsemide (DEMADEX) tablet 20 mg, 20 mg, Oral, BID (diuretic), Beba Galvan MD, 20 mg at 08/08/19 0830    Laboratory Results:  Results from last 7 days   Lab Units 08/08/19  9364 08/07/19  0601 08/06/19  0448 08/05/19  0541 08/04/19  0546 08/03/19  2032 08/03/19  0537 08/02/19  0623  08/01/19  1616   WBC Thousand/uL  --  8 81  --  6 51 7 71  --  8 67 9 42  --  9 92   HEMOGLOBIN g/dL  --  8 8*  --  8 4* 8 3*  --  8 2* 8 6*  --  9 0*   HEMATOCRIT %  --  29 8*  --  28 8* 27 9*  --  27 9* 28 9*  --  30 8*   PLATELETS Thousands/uL  --  368  --  332 325  --  310 265  --  308   POTASSIUM mmol/L 4 5 4 6 4 7 4 9 5 0 5 1 4 6 4 5   < >  --    CHLORIDE mmol/L 100 101 102 104 103 101 103 104   < >  --    CO2 mmol/L 27 26 28 24 26 27 25 28   < >  --    BUN mg/dL 81* 78* 74* 70* 65* 63* 54* 45*   < >  --    CREATININE mg/dL 1 71* 1 54* 1 74* 1 76* 1 86* 2 14* 1 88* 1 60*   < >  --    CALCIUM mg/dL 8 8 9 3 8 4 8 6 8 4 8 4 8 7 8 3   < >  --    MAGNESIUM mg/dL  --   --   --  2 2 2 0  --   --   --   --   --    PHOSPHORUS mg/dL 5 2* 5 8* 4 6* 5 4* 4 8*  --   --   --   --   --     < > = values in this interval not displayed

## 2019-08-08 NOTE — OCCUPATIONAL THERAPY NOTE
Occupational Therapy Treatment Note    Patient Name: Lashanda OTTO'O Date: 8/8/2019 08/08/19 1349   Restrictions/Precautions   Weight Bearing Precautions Per Order No   Other Precautions Contact/isolation;Pain; Fall Risk;Multiple lines   Pain Assessment   Pain Assessment No/denies pain   Therapeutic Excerise-Strength   UE Strength Yes   Right Upper Extremity- Strength   RUE Strength Comment Pt provided with UE theraband handout for biceps, triceps, shoulder flexion, shoulder extension, horizontal abduction   Left Upper Extremity-Strength   LUE Strength Comment Pt provided with UE theraband handout for biceps, triceps, shoulder flexion, shoulder extension, horizontal abduction   Exercise Tools   Exercise Tools Yes   Theraband Red   Cognition   Overall Cognitive Status WFL   Arousal/Participation Alert; Cooperative   Attention Within functional limits   Orientation Level Oriented X4   Memory Within functional limits   Following Commands Follows one step commands without difficulty   Comments Pt pleasant and verbalizes/demonstrates understanding of HEP   Activity Tolerance   Activity Tolerance Patient tolerated treatment well   Medical Staff Made Aware RN Alejandra Hammans cleared pt for session   Assessment   Assessment Patient participated in Skilled OT session this date with interventions consisting of therapeutic exercise to: increase functional use of BUEs, increase BUE muscle strength , increase dynamic sit/ stand balance during functional activity , increase postural control, increase trunk control and increase OOB/ sitting tolerance   Patient agreeable to OT treatment session, upon arrival patient was found seated at edge of bed  Treatment session as follows: Pt able to sit at EOB with no UE support throughout session during therapeutic exercises  Pt performed 10reps each of biceps, triceps, shoulder extension, shoulder flexion, horizontal abduction with red theraband  Pt demos proper technique   Pt provided with handout and time to review visual instruction; pt with no questions  Pt instructed to perform 10reps of entire HEP, 2-3X daily with red theraband  Patient continues to be functioning below baseline level, occupational performance remains limited secondary to factors listed above and increased risk for falls and injury  From OT standpoint, recommendation at time of d/c would be Short Term Rehab  Patient to benefit from continued Occupational Therapy treatment while in the hospital to address deficits as defined above and maximize level of functional independence with ADLs and functional mobility  Pt left with call bell in reach, tray table in reach, needs met  Recommendation   OT Discharge Recommendation Short Term Rehab   OT - OK to Discharge Yes  (to STR)     Pt will achieve the following goals within 10 days  *Pt will complete grooming with setup  *Pt will complete UB bathing and dressing with setup  *Pt will complete LB bathing and dressing with Min A      *Pt will complete toileting (hygiene and clothing management) with Min A    *Pt will complete bed mobility with supervision, with bed flat and no side rail to prep for purposeful tasks    *Pt will perform functional transfers with Min A in order to complete ADL routine  *Pt will demonstrate increased activity tolerance in order to complete ADL routine  *Pt will participate in UE therapeutic exercise in order to maximize strength for ADL transfers  *Pt will sit on EOB for 10+ minutes with no UE support for increased safety with seated activity tolerance during ADL tasks      [employer], OT

## 2019-08-08 NOTE — ASSESSMENT & PLAN NOTE
· Creatinine 1 7 today from baseline of 1 2-1 4  · Nephrology follow-up  · Ultrasound renals = No evidence of nephrolithiasis or hydronephrosis  Stable left renal cyst measuring 1 3 cm  Unremarkable bladder    · Bladder scan and PVRs  · Avoid hypotension/NSAIDs

## 2019-08-08 NOTE — ASSESSMENT & PLAN NOTE
Lab Results   Component Value Date    HGBA1C 9 4 (H) 12/27/2018       Recent Labs     08/07/19  1100 08/07/19  1603 08/07/19  2056 08/08/19  0751   POCGLU 243* 237* 265* 204*     · 07/31/2019: Left AKA  · Patient has completed the course of IV Vanco/Cefepime as per ID  · Monitor WBC and fever curve  · Continue Novolog/Lantus/sliding scale coverage  · Podiatry, ID general surgery on board  · Continue with current pain regimen

## 2019-08-08 NOTE — NUTRITION
08/08/19 1841   Assessment   Timepoint Nutrition Review  (PO4 level )   Recommendations/Interventions   Summary PO4 level 5 2, down from yesterday 5 8  Pt drinking 16-32 oz diet pepsi per meal = 100-200 mg PO4, plus whatever he is eating  Encouraged pt to cut back on dairy and brown soda intake  Interventions Diet: order adjustment   Nutrition Recommendations Other (specify)  (Recommend, if ok with Nephrology add PO4 restriction to current diet  (CCD2, 2gm K+, Low PO4)   )   Nutrition Complexity Risk   Nutrition complexity level Moderate risk   Nutrition review: 08/12/19  (PO4 level)   Follow up date 08/15/19

## 2019-08-08 NOTE — PROGRESS NOTES
Progress Note - General Surgery   Xavier Reyes 79 y o  male MRN: [id number]  Unit/Bed#: [address] [place] [address] [id number] Encounter: [id number]    Assessment:  72-year-old male who is postop day#8 status post left above knee amputation secondary to a nonhealing diabetic foot wound with multiple medical problems    Plan:  -dressing changed, incision appears to be healing well without signs of infection  -continue PT/OT  -continue planning for short-term rehab,   -is okay from surgical standpoint for transfer, will need a follow-up appointment in office for 2 weeks    Subjective/Objective   Chief Complaint:  Moderate pain in left stump with movement    Subjective:  72-year-old male with multiple medical problems including uncontrolled diabetes mellitus, diabetic neuropathy, chronic atrial fibrillation, acute on chronic kidney failure, COPD, hypertension, anemia and petechial rash complaining of pain in his left stump of a 7 as 10 with any movement  He is comfortable at rest   He denies chest pain, shortness of breath, nausea vomiting or diarrhea  Objective:     Blood pressure 153/66, pulse 61, temperature (!) 97 4 °F (36 3 °C), temperature source Oral, resp  rate 17, height 6' 3" (1 905 m), weight (!) 152 kg (335 lb 5 1 oz), SpO2 92 %  ,Body mass index is 41 91 kg/m²  Intake/Output Summary (Last 24 hours) at 8/8/2019 1131  Last data filed at 8/8/2019 9526  Gross per 24 hour   Intake --   Output 2425 ml   Net -2425 ml       Invasive Devices     Peripherally Inserted Central Catheter Line            PICC Line 59/97/56 Right Basilic 9 days                Physical Exam:  Obese male in no acute distress, sitting out of bed in a chair  HEENT:  Normocephalic mucous membranes moist  Chest:  Clear to auscultation throughout  Heart:  Regular rate and rhythm  Abdomen:  Obese nontender  Extremities:  Left stump incision healing well with no signs of infection, moderate swelling as expected    Neuro:  Alert and oriented x3    Lab, Imaging and other studies:  CBC: No results found for: WBC, HGB, HCT, MCV, PLT, ADJUSTEDWBC, MCH, MCHC, RDW, MPV, NRBC, CMP:   Lab Results   Component Value Date    SODIUM 139 08/08/2019    K 4 5 08/08/2019     08/08/2019    CO2 27 08/08/2019    BUN 81 (H) 08/08/2019    CREATININE 1 71 (H) 08/08/2019    CALCIUM 8 8 08/08/2019    AST 21 08/08/2019    ALT 19 08/08/2019    ALKPHOS 150 (H) 08/08/2019    EGFR 41 08/08/2019   , Coagulation:   Lab Results   Component Value Date    INR 1 58 (H) 08/08/2019     VTE Pharmacologic Prophylaxis: Heparin  VTE Mechanical Prophylaxis: sequential compression device     This text is generated with voice recognition software  There may be translation, syntax,  or grammatical errors  If you have any questions, please contact the dictating provider        Kt Putnam PA-C

## 2019-08-08 NOTE — PLAN OF CARE
Problem: Potential for Falls  Goal: Patient will remain free of falls  Description  INTERVENTIONS:  - Assess patient frequently for physical needs  -  Identify cognitive and physical deficits and behaviors that affect risk of falls    -  [place] fall precautions as indicated by assessment   - Educate patient/family on patient safety including physical limitations  - Instruct patient to call for assistance with activity based on assessment  - Modify environment to reduce risk of injury  - Consider OT/PT consult to assist with strengthening/mobility  Outcome: Progressing     Problem: Prexisting or High Potential for Compromised Skin Integrity  Goal: Skin integrity is maintained or improved  Description  INTERVENTIONS:  - Identify patients at risk for skin breakdown  - Assess and monitor skin integrity  - Assess and monitor nutrition and hydration status  - Monitor labs (i e  albumin)  - Assess for incontinence   - Turn and reposition patient  - Assist with mobility/ambulation  - Relieve pressure over bony prominences  - Avoid friction and shearing  - Provide appropriate hygiene as needed including keeping skin clean and dry  - Evaluate need for skin moisturizer/barrier cream  - Collaborate with interdisciplinary team (i e  Nutrition, Rehabilitation, etc )   - Patient/family teaching  Outcome: Progressing     Problem: SKIN/TISSUE INTEGRITY - ADULT  Goal: Skin integrity remains intact  Description  INTERVENTIONS  - Identify patients at risk for skin breakdown  - Assess and monitor skin integrity  - Assess and monitor nutrition and hydration status  - Monitor labs (i e  albumin)  - Assess for incontinence   - Turn and reposition patient  - Assist with mobility/ambulation  - Relieve pressure over bony prominences  - Avoid friction and shearing  - Provide appropriate hygiene as needed including keeping skin clean and dry  - Evaluate need for skin moisturizer/barrier cream  - Collaborate with interdisciplinary team (i e  Nutrition, Rehabilitation, etc )   - Patient/family teaching  Outcome: Progressing  Goal: Incision(s), wounds(s) or drain site(s) healing without S/S of infection  Description  INTERVENTIONS  - Assess and document risk factors for skin impairment   - Assess and document dressing, incision, wound bed, drain sites and surrounding tissue  - Initiate Nutrition services consult and/or wound management as needed  Outcome: Progressing     Problem: MUSCULOSKELETAL - ADULT  Goal: Maintain or return mobility to safest level of function  Description  INTERVENTIONS:  - Assess patient's ability to carry out ADLs; assess patient's baseline for ADL function and identify physical deficits which impact ability to perform ADLs (bathing, care of mouth/teeth, toileting, grooming, dressing, etc )  - Assess/evaluate cause of self-care deficits   - Assess range of motion  - Assess patient's mobility; develop plan if impaired  - Assess patient's need for assistive devices and provide as appropriate  - Encourage maximum independence but intervene and supervise when necessary  - Involve family in performance of ADLs  - Assess for home care needs following discharge   - Request OT consult to assist with ADL evaluation and planning for discharge  - Provide patient education as appropriate  Outcome: Progressing     Problem: PAIN - ADULT  Goal: Verbalizes/displays adequate comfort level or baseline comfort level  Description  Interventions:  - Encourage patient to monitor pain and request assistance  - Assess pain using appropriate pain scale  - Administer analgesics based on type and severity of pain and evaluate response  - Implement non-pharmacological measures as appropriate and evaluate response  - Consider cultural and social influences on pain and pain management  - Notify physician/advanced practitioner if interventions unsuccessful or patient reports new pain  Outcome: Progressing     Problem: INFECTION - ADULT  Goal: Absence or prevention of progression during hospitalization  Description  INTERVENTIONS:  - Assess and monitor for signs and symptoms of infection  - Monitor lab/diagnostic results  - Monitor all insertion sites, i e  indwelling lines, tubes, and drains  - Monitor endotracheal (as able) and nasal secretions for changes in amount and color  - [place] appropriate cooling/warming therapies per order  - Administer medications as ordered  - Instruct and encourage patient and family to use good hand hygiene technique  - Identify and instruct in appropriate isolation precautions for identified infection/condition  Outcome: Progressing     Problem: SAFETY ADULT  Goal: Patient will remain free of falls  Description  INTERVENTIONS:  - Assess patient frequently for physical needs  -  Identify cognitive and physical deficits and behaviors that affect risk of falls    -  [place] fall precautions as indicated by assessment   - Educate patient/family on patient safety including physical limitations  - Instruct patient to call for assistance with activity based on assessment  - Modify environment to reduce risk of injury  - Consider OT/PT consult to assist with strengthening/mobility  Outcome: Progressing     Problem: DISCHARGE PLANNING  Goal: Discharge to home or other facility with appropriate resources  Description  INTERVENTIONS:  - Identify barriers to discharge w/patient and caregiver  - Arrange for needed discharge resources and transportation as appropriate  - Identify discharge learning needs (meds, wound care, etc )  - Arrange for interpretive services to assist at discharge as needed  - Refer to Case Management Department for coordinating discharge planning if the patient needs post-hospital services based on physician/advanced practitioner order or complex needs related to functional status, cognitive ability, or social support system  Outcome: Progressing     Problem: Knowledge Deficit  Goal: Patient/family/caregiver demonstrates understanding of disease process, treatment plan, medications, and discharge instructions  Description  Complete learning assessment and assess knowledge base  Interventions:  - Provide teaching at level of understanding  - Provide teaching via preferred learning methods  Outcome: Progressing     Problem: Nutrition/Hydration-ADULT  Goal: Nutrient/Hydration intake appropriate for improving, restoring or maintaining nutritional needs  Description  Monitor and assess patient's nutrition/hydration status for malnutrition (ex- brittle hair, bruises, dry skin, pale skin and conjunctiva, muscle wasting, smooth red tongue, and disorientation)  Collaborate with interdisciplinary team and initiate plan and interventions as ordered  Monitor patient's weight and dietary intake as ordered or per policy  Utilize nutrition screening tool and intervene per policy  Determine patient's food preferences and provide high-protein, high-caloric foods as appropriate       INTERVENTIONS:  - Monitor oral intake, urinary output, labs, and treatment plans  - Assess nutrition and hydration status and recommend course of action  - Evaluate amount of meals eaten  - Assist patient with eating if necessary   - Allow adequate time for meals  - Recommend/ encourage appropriate diets, oral nutritional supplements, and vitamin/mineral supplements  - Order, calculate, and assess calorie counts as needed  - Recommend, monitor, and adjust tube feedings and TPN/PPN based on assessed needs  - Assess need for intravenous fluids  - Provide specific nutrition/hydration education as appropriate  - Include patient/family/caregiver in decisions related to nutrition  Outcome: Progressing

## 2019-08-08 NOTE — ASSESSMENT & PLAN NOTE
?  Etiology  Continue on prednisone  Serologic workup was ordered  Continue Benadryl p r n    Surgery to to skin punch biopsy today

## 2019-08-08 NOTE — PLAN OF CARE
Problem: PHYSICAL THERAPY ADULT  Goal: Performs mobility at highest level of function for planned discharge setting  See evaluation for individualized goals  Description  Treatment/Interventions: ADL retraining, Functional transfer training, LE strengthening/ROM, Therapeutic exercise, Endurance training, Patient/family training, Bed mobility, OT, Spoke to nursing  Equipment Recommended: [place], Wheelchair       See flowsheet documentation for full assessment, interventions and recommendations  Outcome: Progressing  Note:   Prognosis: Fair  Problem List: Decreased strength, Decreased endurance, Impaired balance, Decreased mobility, Pain, Impaired sensation, Obesity  Assessment: Patient was received OOB on commode and was agreeable to session  Reports no pain at rest but that pain increases to 5/10 with movement  He performed transfer with max A x 2  Max verbal cues for technique and for posture  Stood approx 1 minute for alaina-care  Patient also participated in LE there-ex  Advised patient to perform 2-3x daily  He will require a rehab stay at discharge  Barriers to Discharge: Inaccessible home environment, Decreased caregiver support     Recommendation: Short-term skilled PT     PT - OK to Discharge: Yes    See flowsheet documentation for full assessment

## 2019-08-08 NOTE — PLAN OF CARE
Problem: OCCUPATIONAL THERAPY ADULT  Goal: Performs self-care activities at highest level of function for planned discharge setting  See evaluation for individualized goals  Description  Treatment Interventions: ADL retraining, Functional transfer training, UE strengthening/ROM, Endurance training, Patient/family training, Equipment evaluation/education, Compensatory technique education, Energy conservation, Activityengagement          See flowsheet documentation for full assessment, interventions and recommendations  Outcome: Progressing  Note:   Limitation: Decreased ADL status, Decreased UE strength, Decreased Safe judgement during ADL, Decreased endurance, Decreased self-care trans, Decreased high-level ADLs  Prognosis: Good  Assessment: Patient participated in Skilled OT session this date with interventions consisting of therapeutic exercise to: increase functional use of BUEs, increase BUE muscle strength , increase dynamic sit/ stand balance during functional activity , increase postural control, increase trunk control and increase OOB/ sitting tolerance   Patient agreeable to OT treatment session, upon arrival patient was found seated at edge of bed  Treatment session as follows: Pt able to sit at EOB with no UE support throughout session during therapeutic exercises  Pt performed 10reps each of biceps, triceps, shoulder extension, shoulder flexion, horizontal abduction with red theraband  Pt demos proper technique  Pt provided with handout and time to review visual instruction; pt with no questions  Pt instructed to perform 10reps of entire HEP, 2-3X daily with red theraband  Patient continues to be functioning below baseline level, occupational performance remains limited secondary to factors listed above and increased risk for falls and injury  From OT standpoint, recommendation at time of d/c would be Short Term Rehab     Patient to benefit from continued Occupational Therapy treatment while in the hospital to address deficits as defined above and maximize level of functional independence with ADLs and functional mobility  Pt left with call bell in reach, tray table in reach, needs met       OT Discharge Recommendation: Short Term Rehab  OT - OK to Discharge: Yes(to STR)

## 2019-08-08 NOTE — PHYSICAL THERAPY NOTE
PHYSICAL THERAPY NOTE       08/08/19 1006   Pain Assessment   Pain Assessment 0-10   Pain Score 5   Pain Type Surgical pain   Pain Location Leg   Pain Orientation Left   Restrictions/Precautions   Weight Bearing Precautions Per Order No   Other Precautions Fall Risk;Pain;Multiple lines;Contact/isolation   General   Chart Reviewed Yes   Response to Previous Treatment Patient with no complaints from previous session  Family/Caregiver Present Yes   Cognition   Overall Cognitive Status WFL   Orientation Level Oriented X4   Following Commands Follows one step commands without difficulty   Comments Needs encouragement   Subjective   Subjective " This is really hard for me "   Bed Mobility   Additional Comments Received on commode  RN and PCT present  Transfers   Sit to Stand 2  Maximal assistance   Additional items Assist x 2;Armrests; Increased time required;Verbal cues   Stand to Sit 2  Maximal assistance   Additional items Assist x 2;Armrests; Verbal cues   Stand pivot 2  Maximal assistance   Additional items Assist x 2;Armrests; Increased time required;Verbal cues   Additional Comments Stood for approx 1 minute for alaina-care  Repeated verbal cues for sequencing, posture  (use of RW for transfer)   Balance   Static Sitting Fair +   Dynamic Sitting Fair   Static Standing Poor +   Dynamic Standing Poor   Endurance Deficit   Endurance Deficit Yes   Activity Tolerance   Activity Tolerance Patient limited by fatigue;Patient limited by pain   Medical Staff Made Aware PCT and RN present for session   Exercises   Glute Sets Sitting;10 reps   Hip Flexion Sitting;10 reps;Bilateral   Knee AROM Long Arc Quad Sitting;10 reps;Right   Ankle Pumps Sitting;10 reps;Right   Assessment   Prognosis Fair   Problem List Decreased strength;Decreased endurance; Impaired balance;Decreased mobility;Pain; Impaired sensation;Obesity   Assessment Patient was received OOB on commode and was agreeable to session  Reports no pain at rest but that pain increases to 5/10 with movement  He performed transfer with max A x 2  Max verbal cues for technique and for posture  Stood approx 1 minute for alaina-care  Patient also participated in LE there-ex  Advised patient to perform 2-3x daily  He will require a rehab stay at discharge  Barriers to Discharge Inaccessible home environment;Decreased caregiver support   Goals   Patient Goals To have less pain   STG Expiration Date 08/10/19   Plan   Treatment/Interventions Functional transfer training; Therapeutic exercise; Endurance training;Patient/family training;Bed mobility;Spoke to nursing   Progress Slow progress, medical status limitations  (pain)   PT Frequency 5x/wk   Recommendation   Recommendation Short-term skilled PT   Equipment Recommended Wheelchair;Anish Bolaños, PT            Patient Name: Zakia MATHIAS Date: 8/8/2019

## 2019-08-08 NOTE — PROGRESS NOTES
Progress Note - Forestine Pimple 1950, 79 y o  male MRN: [id number]    Unit/Bed#: [address] [place] [address] Encounter: [id number]    Primary Care Provider: Cj Levin MD   Date and time admitted to hospital: 7/25/2019  1:04 PM        Biclonal gammopathy  Assessment & Plan  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  Multiple myeloma workup is pending  Hematology consult appreciated  They recommend bone marrow biopsy  Skeletal survey, IgG, IgA, IgM levels to be drawn along with beta 2 microglobulin were ordered  Rash  Assessment & Plan  ? Etiology  Continue on prednisone  Serologic workup was ordered  Continue Benadryl p r n  Surgery to to skin punch biopsy today      Acute renal failure superimposed on stage 3 chronic kidney disease (HCC)  Assessment & Plan  · Creatinine 1 7 today from baseline of 1 2-1 4  · Nephrology follow-up  · Ultrasound renals = No evidence of nephrolithiasis or hydronephrosis  Stable left renal cyst measuring 1 3 cm  Unremarkable bladder  · Bladder scan and PVRs  · Avoid hypotension/NSAIDs    Moderate persistent asthma without complication  Assessment & Plan  · cont albuterol/Advair    Acute on chronic diastolic congestive heart failure ([address] )  Assessment & Plan  · Patient had volume overload post surgery  Was diuresed with IV Lasix initially and switched to torsemide  · Daily weight and I&Os  · Continue on torsemide  · Continue beta-blockers/Aldactone/atorvastatin      Weight (last 2 days)     Date/Time   Weight    08/08/19 0600   (!) 152 (335 32)    08/07/19 0600   (!) 153 (337 3)    08/06/19 0600   (!) 151 (333 78)              Intake/Output Summary (Last 24 hours) at 8/8/2019 1113  Last data filed at 8/8/2019 0893  Gross per 24 hour   Intake --   Output 2425 ml   Net -2425 ml       Morbid obesity ([address] )  Assessment & Plan  · Encourage weight loss    Chronic atrial fibrillation ([address] )  Assessment & Plan  · Patient has been on heparin drip and Coumadin on 08/01/2019 after discussion with surgery  Overlap until INR is therapeutic  · Continue Lopressor  · Cardiology on board    Essential hypertension  Assessment & Plan  · continue torsemide/ beta-blocker/Aldactone  · Cozaar on hold    Chronic venous stasis dermatitis of both lower extremities  Assessment & Plan  · Chronic, POA  · S/P left AKA  · podiatry/wound care  * Type 2 diabetes mellitus with left diabetic foot ulcer [facility])  Assessment & Plan  Lab Results   Component Value Date    HGBA1C 9 4 (H) 12/27/2018       Recent Labs     08/07/19  1100 08/07/19  1603 08/07/19  2056 08/08/19  0751   POCGLU 243* 237* 265* 204*     · 07/31/2019: Left AKA  · Patient has completed the course of IV Vanco/Cefepime as per ID  · Monitor WBC and fever curve  · Continue Novolog/Lantus/sliding scale coverage  · Podiatry, ID general surgery on board  · Continue with current pain regimen  Labs & Imaging: I have personally reviewed pertinent reports  VTE Pharmacologic Prophylaxis: Heparin and Warfarin (Coumadin)  VTE Mechanical Prophylaxis: sequential compression device    Code Status:   Level 1 - Full Code    Patient Centered Rounds: I have performed bedside rounds with nursing staff today  Discussions with Specialists or Other Care Team Provider:     Education and Discussions with Family / Patient:  Wife    Current Length of Stay: 15 day(s)    Current Patient Status: Inpatient   Certification Statement: The patient will continue to require additional inpatient hospital stay due to see my assessment and plan  Subjective:   Patient is seen and examined at bedside  No new complaints  Rash is improved a little  No new areas of rash noted  Denies any active or overt rectal bleeding  No other complaints  Afebrile  All other ROS are negative  Objective:    Vitals: Blood pressure 153/66, pulse 61, temperature (!) 97 4 °F (36 3 °C), temperature source Oral, resp   rate 17, height 6' 3" (1 905 m), weight (!) 152 kg (335 lb 5 1 oz), SpO2 92 %  ,Body mass index is 41 91 kg/m²  SPO2 RA Rest      ED to Hosp-Admission (Current) from 7/25/2019 in [facility]   SpO2  92 %   SpO2 Activity  At Rest   O2 Device  None (Room air)   O2 Flow Rate  2 L/min        I&O:     Intake/Output Summary (Last 24 hours) at 8/8/2019 1120  Last data filed at 8/8/2019 2134  Gross per 24 hour   Intake --   Output 2425 ml   Net -2425 ml       Physical Exam:    General- Alert, lying comfortably in bed  Not in any acute distress  HEENT- JENNIE, EOM intact  Neck- Supple, No JVD  CVS- regular, S1 and S2 normal   Chest- Bilateral Air entry, No rhochi, crackles or wheezing present  Abdomen- soft, nontender, not distended, no guarding or rigidity, BS+  Extremities-  left AKA-stump site looks clean  Skin-macular rash on forearm, buttocks and lower extremity  CNS-   Alert, awake and orientedx3  No focal deficits present      Invasive Devices     Peripherally Inserted Central Catheter Line            PICC Line 08/38/84 Right Basilic 9 days                      Social History  reviewed  Family History   Problem Relation Age of Onset    Other Mother         CARDIAC DISORDER     Diabetes Mother     Cancer Father     Other Family         CARDIAC DISORDER     Diabetes Family     Cancer Family     Mental illness Family     reviewed    Meds:  Current Facility-Administered Medications   Medication Dose Route Frequency Provider Last Rate Last Dose    acetaminophen (TYLENOL) tablet 650 mg  650 mg Oral Q6H PRN Suzette Sánchez PA-C        albuterol (PROVENTIL HFA,VENTOLIN HFA) inhaler 2 puff  2 puff Inhalation Q6H PRN Suzette Sánchez PA-C   2 puff at 07/31/19 0749    atorvastatin (LIPITOR) tablet 20 mg  20 mg Oral Daily Suzette Sánchez PA-C   20 mg at 08/08/19 1421    clotrimazole (LOTRIMIN) 1 % cream   Topical BID Anahi Enamorado MD        diphenhydrAMINE (BENADRYL) tablet 25 mg  25 mg Oral Q8H PRN Anahi Enamorado MD   25 mg at 08/05/19 0716    fluticasone-vilanterol (BREO ELLIPTA) 100-25 mcg/inh inhaler 1 puff  1 puff Inhalation Daily Suzette Sánchez PA-C   1 puff at 08/08/19 0835    heparin (porcine) 25,000 units in 250 mL infusion (premix)  3-20 Units/kg/hr (Order-Specific) Intravenous Titrated NICOLA Verma 18 9 mL/hr at 08/08/19 0403 21 Units/kg/hr at 08/08/19 0403    heparin (porcine) injection 2,000 Units  2,000 Units Intravenous PRN NICOLA Verma   2,000 Units at 08/04/19 1449    heparin (porcine) injection 4,000 Units  4,000 Units Intravenous PRN NICOLA Verma   4,000 Units at 08/04/19 2304    hydrALAZINE (APRESOLINE) injection 10 mg  10 mg Intravenous Q6H PRN Horacio Garcia MD        HYDROcodone-acetaminophen (NORCO) 5-325 mg per tablet 2 tablet  2 tablet Oral Q4H PRN Carol Wilkins PA-C   2 tablet at 08/07/19 2241    HYDROmorphone (DILAUDID) injection 0 5 mg  0 5 mg Intravenous Q2H PRN Dalila Mora PA-C   0 5 mg at 08/05/19 2112    insulin glargine (LANTUS) subcutaneous injection 14 Units 0 14 mL  14 Units Subcutaneous HS Suzette Sánchez PA-C   14 Units at 08/07/19 2228    insulin lispro (HumaLOG) 100 units/mL subcutaneous injection 2-12 Units  2-12 Units Subcutaneous TID AC Suzette Sánchez PA-C   4 Units at 08/08/19 0829    insulin lispro (HumaLOG) 100 units/mL subcutaneous injection 30 Units  30 Units Subcutaneous TID With Meals Suzette Sánchez PA-C   30 Units at 08/08/19 0829    metoprolol tartrate (LOPRESSOR) tablet 25 mg  25 mg Oral Q12H [address] Suzette Sánchez PA-C   25 mg at 08/08/19 0830    predniSONE tablet 40 mg  40 mg Oral Daily Horacio Garcia MD   40 mg at 08/08/19 0830    torsemide (DEMADEX) tablet 20 mg  20 mg Oral BID (diuretic) Horacio Garcia MD   20 mg at 08/08/19 0830    warfarin (COUMADIN) tablet 10 mg  10 mg Oral Daily (warfarin) Horacio Garcia MD   10 mg at 08/07/19 1714      Medications Prior to Admission Medication    albuterol (PROVENTIL HFA,VENTOLIN HFA) 90 mcg/act inhaler    allopurinol (ZYLOPRIM) 300 mg tablet    atorvastatin (LIPITOR) 20 mg tablet    BD INSULIN SYRINGE U/F 31G X 5/16" 0 5 ML MISC    BD PEN NEEDLE HILARIO U/F 32G X 4 MM MISC    fluticasone-salmeterol (ADVAIR DISKUS, WIXELA INHUB) 250-50 mcg/dose inhaler    glucose blood (ONE TOUCH ULTRA TEST) test strip    insulin aspart (NOVOLOG FLEXPEN) 100 Units/mL injection pen    Insulin Pen Needle 31G X 5 MM MISC    losartan (COZAAR) 50 mg tablet    metFORMIN (GLUCOPHAGE) 1000 MG tablet    metoprolol tartrate (LOPRESSOR) 25 mg tablet    torsemide (DEMADEX) 20 mg tablet    warfarin (COUMADIN) 5 mg tablet       Labs:  Results from last 7 days   Lab Units 08/07/19  0601 08/05/19  0541 08/04/19  0546  08/02/19  0623   WBC Thousand/uL 8 81 6 51 7 71   < > 9 42   HEMOGLOBIN g/dL 8 8* 8 4* 8 3*   < > 8 6*   HEMATOCRIT % 29 8* 28 8* 27 9*   < > 28 9*   PLATELETS Thousands/uL 368 332 325   < > 265   NEUTROS PCT % 75 57  --   --  66   LYMPHS PCT % 15 23  --   --  16   LYMPHO PCT %  --   --  20   < >  --    MONOS PCT % 6 9  --   --  9   MONO PCT %  --   --  4   < >  --    EOS PCT % 1 5 5   < > 3    < > = values in this interval not displayed  Results from last 7 days   Lab Units 08/08/19  0517 08/07/19  0601 08/06/19  0448   POTASSIUM mmol/L 4 5 4 6 4 7   CHLORIDE mmol/L 100 101 102   CO2 mmol/L 27 26 28   BUN mg/dL 81* 78* 74*   CREATININE mg/dL 1 71* 1 54* 1 74*   CALCIUM mg/dL 8 8 9 3 8 4   ALK PHOS U/L 150*  --   --    ALT U/L 19  --   --    AST U/L 21  --   --      Lab Results   Component Value Date    TROPONINI <0 02 03/23/2018    TROPONINI <0 02 10/08/2017    CKTOTAL 53 03/23/2018    CKTOTAL 55 10/08/2017     Results from last 7 days   Lab Units 08/08/19  0518 08/07/19  0618 08/06/19  0448   INR  1 58* 1 37* 1 20*     Lab Results   Component Value Date    BLOODCX No Growth After 5 Days  07/25/2019    BLOODCX No Growth After 5 Days   07/25/2019 BLOODCX No Growth After 5 Days  12/25/2018    WOUNDCULT 4+ Growth of Proteus mirabilis (A) 07/25/2019    WOUNDCULT 4+ Growth of  07/25/2019    WOUNDCULT (A) 07/25/2019     4+ Growth of Methicillin Resistant Staphylococcus aureus    WOUNDCULT 1+ Growth of Proteus mirabilis (A) 07/25/2019    WOUNDCULT 4+ Growth of  07/25/2019         Imaging:  Results for orders placed during the hospital encounter of 07/25/19   XR chest portable    Narrative CHEST     INDICATION:   chf     COMPARISON:  Chest radiograph 7/29/2019    EXAM PERFORMED/VIEWS:  XR CHEST PORTABLE      FINDINGS:  Right-sided PICC line with tip in the region of the cavoatrial junction  Heart shadow is enlarged but unchanged from prior exam     There is pulmonary vascular congestion  Streaky bibasilar opacities  No large effusion or pneumothorax  Osseous structures appear within normal limits for patient age  Impression 1  Stable enlargement of the cardiac silhouette and pulmonary vascular congestion  2   Bibasilar linear opacities compatible with atelectasis or scarring  Workstation performed: [id number]       Results for orders placed in visit on 05/02/19   XR chest pa & lateral    Narrative CHEST     INDICATION:   J45 40: Moderate persistent asthma, uncomplicated  X89 52: Dyspnea, unspecified  COMPARISON:  One view chest 3/23/2018    EXAM PERFORMED/VIEWS:  XR CHEST PA & LATERAL      FINDINGS:    Cardiac silhouette is enlarged  Aortic calcification is present  Minimal bibasilar subsegmental atelectasis left greater than right  No pneumothorax, or pleural effusion  Mild distention of central pulmonary vessels  Multilevel thoracolumbar spondylosis  Impression Mild central pulmonary vascular congestion  Minimal bibasilar subsegmental atelectasis left greater than right          Workstation performed: [id number]         Last 24 Hours Medication List:     Current Facility-Administered Medications:  acetaminophen 650 mg Oral Q6H PRN LESLEY Parry-SHANEKA    albuterol 2 puff Inhalation Q6H PRN Javier Sánchez PA-C    atorvastatin 20 mg Oral Daily LESLEY Cortés-SHANEKA    clotrimazole  Topical BID Harrison Bee MD    diphenhydrAMINE 25 mg Oral Q8H PRN Harrison Bee MD    fluticasone-vilanterol 1 puff Inhalation Daily Suzette Sánchez PA-C    heparin (porcine) 3-20 Units/kg/hr (Order-Specific) Intravenous Titrated NICOLA Burgos Last Rate: 21 Units/kg/hr (08/08/19 0403)   heparin (porcine) 2,000 Units Intravenous PRN NICOLA Burgos    heparin (porcine) 4,000 Units Intravenous PRN NICOLA Burgos    hydrALAZINE 10 mg Intravenous Q6H PRN Harrison Bee MD    HYDROcodone-acetaminophen 2 tablet Oral Q4H PRN Dalila Mora PA-C    HYDROmorphone 0 5 mg Intravenous Q2H PRN Dalila Mora PA-C    insulin glargine 14 Units Subcutaneous HS LESLEY Cortés-SHANEKA    insulin lispro 2-12 Units Subcutaneous TID AC Suzette Sánchez PA-C    insulin lispro 30 Units Subcutaneous TID With Meals LESLEY Parry-SHANEKA    metoprolol tartrate 25 mg Oral Q12H [facility] Suzette Sánchez PA-C    predniSONE 40 mg Oral Daily Harrison Bee MD    torsemide 20 mg Oral BID (diuretic) Harrison Bee MD    warfarin 10 mg Oral Daily (warfarin) Harrison Bee MD         Today, Patient Was Seen By: Harrison Bee MD    ** Please Note: Dictation voice to text software may have been used in the creation of this document   **

## 2019-08-08 NOTE — ASSESSMENT & PLAN NOTE
· Patient had volume overload post surgery  Was diuresed with IV Lasix initially and switched to torsemide  · Daily weight and I&Os  · Continue on torsemide  · Continue beta-blockers/Aldactone/atorvastatin      Weight (last 2 days)     Date/Time   Weight    08/08/19 0600   (!) 152 (335 32)    08/07/19 0600   (!) 153 (337 3)    08/06/19 0600   (!) 151 (333 78)              Intake/Output Summary (Last 24 hours) at 8/8/2019 1113  Last data filed at 8/8/2019 0832  Gross per 24 hour   Intake --   Output 2425 ml   Net -2425 ml

## 2019-08-08 NOTE — ASSESSMENT & PLAN NOTE
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  Multiple myeloma workup is pending  Hematology consult appreciated  They recommend bone marrow biopsy  Skeletal survey, IgG, IgA, IgM levels to be drawn along with beta 2 microglobulin were ordered

## 2019-08-09 ENCOUNTER — APPOINTMENT (INPATIENT)
Dept: RADIOLOGY | Facility: HOSPITAL | Age: 69
DRG: 616 | End: 2019-08-09
Attending: INTERNAL MEDICINE
Payer: COMMERCIAL

## 2019-08-09 LAB
ANION GAP SERPL CALCULATED.3IONS-SCNC: 13 MMOL/L (ref 4–13)
APTT PPP: 61 SECONDS (ref 23–37)
B2 MICROGLOB SERPL-MCNC: 4.6 MG/L (ref 0.6–2.4)
BASOPHILS # BLD AUTO: 0.04 THOUSANDS/ΜL (ref 0–0.1)
BASOPHILS NFR BLD AUTO: 1 % (ref 0–1)
BUN SERPL-MCNC: 94 MG/DL (ref 5–25)
C-ANCA TITR SER IF: NORMAL TITER
CALCIUM SERPL-MCNC: 9.1 MG/DL (ref 8.3–10.1)
CHLORIDE SERPL-SCNC: 102 MMOL/L (ref 100–108)
CO2 SERPL-SCNC: 25 MMOL/L (ref 21–32)
CREAT SERPL-MCNC: 1.68 MG/DL (ref 0.6–1.3)
EOSINOPHIL # BLD AUTO: 0.02 THOUSAND/ΜL (ref 0–0.61)
EOSINOPHIL NFR BLD AUTO: 0 % (ref 0–6)
ERYTHROCYTE [DISTWIDTH] IN BLOOD BY AUTOMATED COUNT: 17.3 % (ref 11.6–15.1)
GFR SERPL CREATININE-BSD FRML MDRD: 42 ML/MIN/1.73SQ M
GLUCOSE SERPL-MCNC: 225 MG/DL (ref 65–140)
GLUCOSE SERPL-MCNC: 244 MG/DL (ref 65–140)
GLUCOSE SERPL-MCNC: 247 MG/DL (ref 65–140)
GLUCOSE SERPL-MCNC: 335 MG/DL (ref 65–140)
GLUCOSE SERPL-MCNC: 352 MG/DL (ref 65–140)
GLUCOSE SERPL-MCNC: 369 MG/DL (ref 65–140)
HCT VFR BLD AUTO: 31.2 % (ref 36.5–49.3)
HGB BLD-MCNC: 9.2 G/DL (ref 12–17)
IMM GRANULOCYTES # BLD AUTO: 0.17 THOUSAND/UL (ref 0–0.2)
IMM GRANULOCYTES NFR BLD AUTO: 2 % (ref 0–2)
INR PPP: 1.81 (ref 0.84–1.19)
LYMPHOCYTES # BLD AUTO: 1.42 THOUSANDS/ΜL (ref 0.6–4.47)
LYMPHOCYTES NFR BLD AUTO: 19 % (ref 14–44)
MAGNESIUM SERPL-MCNC: 1.7 MG/DL (ref 1.6–2.6)
MCH RBC QN AUTO: 26.9 PG (ref 26.8–34.3)
MCHC RBC AUTO-ENTMCNC: 29.5 G/DL (ref 31.4–37.4)
MCV RBC AUTO: 91 FL (ref 82–98)
MONOCYTES # BLD AUTO: 0.44 THOUSAND/ΜL (ref 0.17–1.22)
MONOCYTES NFR BLD AUTO: 6 % (ref 4–12)
MYELOPEROXIDASE AB SER IA-ACNC: <9 U/ML (ref 0–9)
NEUTROPHILS # BLD AUTO: 5.42 THOUSANDS/ΜL (ref 1.85–7.62)
NEUTS SEG NFR BLD AUTO: 72 % (ref 43–75)
NRBC BLD AUTO-RTO: 0 /100 WBCS
P-ANCA ATYPICAL TITR SER IF: NORMAL TITER
P-ANCA TITR SER IF: NORMAL TITER
PHOSPHATE SERPL-MCNC: 5.3 MG/DL (ref 2.3–4.1)
PLATELET # BLD AUTO: 413 THOUSANDS/UL (ref 149–390)
PMV BLD AUTO: 10.1 FL (ref 8.9–12.7)
POTASSIUM SERPL-SCNC: 4.4 MMOL/L (ref 3.5–5.3)
PROTEINASE3 AB SER IA-ACNC: <3.5 U/ML (ref 0–3.5)
PROTHROMBIN TIME: 20.7 SECONDS (ref 11.6–14.5)
RBC # BLD AUTO: 3.42 MILLION/UL (ref 3.88–5.62)
RYE IGE QN: NEGATIVE
SODIUM SERPL-SCNC: 140 MMOL/L (ref 136–145)
WBC # BLD AUTO: 7.51 THOUSAND/UL (ref 4.31–10.16)

## 2019-08-09 PROCEDURE — 77012 CT SCAN FOR NEEDLE BIOPSY: CPT

## 2019-08-09 PROCEDURE — 88305 TISSUE EXAM BY PATHOLOGIST: CPT | Performed by: PATHOLOGY

## 2019-08-09 PROCEDURE — 88342 IMHCHEM/IMCYTCHM 1ST ANTB: CPT | Performed by: PATHOLOGY

## 2019-08-09 PROCEDURE — 83735 ASSAY OF MAGNESIUM: CPT | Performed by: INTERNAL MEDICINE

## 2019-08-09 PROCEDURE — 88341 IMHCHEM/IMCYTCHM EA ADD ANTB: CPT | Performed by: PATHOLOGY

## 2019-08-09 PROCEDURE — 85097 BONE MARROW INTERPRETATION: CPT | Performed by: PATHOLOGY

## 2019-08-09 PROCEDURE — 94760 N-INVAS EAR/PLS OXIMETRY 1: CPT

## 2019-08-09 PROCEDURE — 99232 SBSQ HOSP IP/OBS MODERATE 35: CPT | Performed by: INTERNAL MEDICINE

## 2019-08-09 PROCEDURE — 80048 BASIC METABOLIC PNL TOTAL CA: CPT | Performed by: INTERNAL MEDICINE

## 2019-08-09 PROCEDURE — 88333 PATH CONSLTJ SURG CYTO XM 1: CPT | Performed by: PATHOLOGY

## 2019-08-09 PROCEDURE — 88313 SPECIAL STAINS GROUP 2: CPT | Performed by: PATHOLOGY

## 2019-08-09 PROCEDURE — 88311 DECALCIFY TISSUE: CPT | Performed by: PATHOLOGY

## 2019-08-09 PROCEDURE — 88365 INSITU HYBRIDIZATION (FISH): CPT | Performed by: PATHOLOGY

## 2019-08-09 PROCEDURE — 99152 MOD SED SAME PHYS/QHP 5/>YRS: CPT

## 2019-08-09 PROCEDURE — 88185 FLOWCYTOMETRY/TC ADD-ON: CPT

## 2019-08-09 PROCEDURE — 88364 INSITU HYBRIDIZATION (FISH): CPT | Performed by: PATHOLOGY

## 2019-08-09 PROCEDURE — 82948 REAGENT STRIP/BLOOD GLUCOSE: CPT

## 2019-08-09 PROCEDURE — 94640 AIRWAY INHALATION TREATMENT: CPT

## 2019-08-09 PROCEDURE — 85610 PROTHROMBIN TIME: CPT | Performed by: INTERNAL MEDICINE

## 2019-08-09 PROCEDURE — 07DR3ZX EXTRACTION OF ILIAC BONE MARROW, PERCUTANEOUS APPROACH, DIAGNOSTIC: ICD-10-PCS | Performed by: RADIOLOGY

## 2019-08-09 PROCEDURE — 88360 TUMOR IMMUNOHISTOCHEM/MANUAL: CPT | Performed by: PATHOLOGY

## 2019-08-09 PROCEDURE — 38222 DX BONE MARROW BX & ASPIR: CPT

## 2019-08-09 PROCEDURE — 99153 MOD SED SAME PHYS/QHP EA: CPT

## 2019-08-09 PROCEDURE — 85730 THROMBOPLASTIN TIME PARTIAL: CPT | Performed by: INTERNAL MEDICINE

## 2019-08-09 PROCEDURE — 84100 ASSAY OF PHOSPHORUS: CPT | Performed by: INTERNAL MEDICINE

## 2019-08-09 PROCEDURE — 85025 COMPLETE CBC W/AUTO DIFF WBC: CPT | Performed by: INTERNAL MEDICINE

## 2019-08-09 RX ORDER — ACETAMINOPHEN 325 MG/1
650 TABLET ORAL EVERY 4 HOURS PRN
Status: DISCONTINUED | OUTPATIENT
Start: 2019-08-09 | End: 2019-08-12 | Stop reason: HOSPADM

## 2019-08-09 RX ORDER — MIDAZOLAM HYDROCHLORIDE 1 MG/ML
INJECTION INTRAMUSCULAR; INTRAVENOUS CODE/TRAUMA/SEDATION MEDICATION
Status: DISCONTINUED | OUTPATIENT
Start: 2019-08-09 | End: 2019-08-12 | Stop reason: HOSPADM

## 2019-08-09 RX ORDER — FENTANYL CITRATE 50 UG/ML
INJECTION, SOLUTION INTRAMUSCULAR; INTRAVENOUS CODE/TRAUMA/SEDATION MEDICATION
Status: DISCONTINUED | OUTPATIENT
Start: 2019-08-09 | End: 2019-08-12 | Stop reason: HOSPADM

## 2019-08-09 RX ORDER — WARFARIN SODIUM 7.5 MG/1
7.5 TABLET ORAL
Status: DISCONTINUED | OUTPATIENT
Start: 2019-08-09 | End: 2019-08-12 | Stop reason: HOSPADM

## 2019-08-09 RX ADMIN — HEPARIN SODIUM AND DEXTROSE 21 UNITS/KG/HR: 10000; 5 INJECTION INTRAVENOUS at 08:06

## 2019-08-09 RX ADMIN — PREDNISONE 40 MG: 20 TABLET ORAL at 09:37

## 2019-08-09 RX ADMIN — FENTANYL CITRATE 25 MCG: 50 INJECTION, SOLUTION INTRAMUSCULAR; INTRAVENOUS at 14:17

## 2019-08-09 RX ADMIN — HYDROMORPHONE HYDROCHLORIDE 0.5 MG: 1 INJECTION, SOLUTION INTRAMUSCULAR; INTRAVENOUS; SUBCUTANEOUS at 08:13

## 2019-08-09 RX ADMIN — CALCIUM ACETATE 667 MG: 667 CAPSULE ORAL at 17:55

## 2019-08-09 RX ADMIN — TORSEMIDE 20 MG: 20 TABLET ORAL at 17:56

## 2019-08-09 RX ADMIN — CLOTRIMAZOLE: 10 CREAM TOPICAL at 18:02

## 2019-08-09 RX ADMIN — MIDAZOLAM 0.5 MG: 1 INJECTION INTRAMUSCULAR; INTRAVENOUS at 14:24

## 2019-08-09 RX ADMIN — MIDAZOLAM 0.5 MG: 1 INJECTION INTRAMUSCULAR; INTRAVENOUS at 14:44

## 2019-08-09 RX ADMIN — DEXTROSE AND SODIUM CHLORIDE 50 ML/HR: 5; .9 INJECTION, SOLUTION INTRAVENOUS at 00:31

## 2019-08-09 RX ADMIN — INSULIN LISPRO 30 UNITS: 100 INJECTION, SOLUTION INTRAVENOUS; SUBCUTANEOUS at 17:54

## 2019-08-09 RX ADMIN — FENTANYL CITRATE 25 MCG: 50 INJECTION, SOLUTION INTRAMUSCULAR; INTRAVENOUS at 14:30

## 2019-08-09 RX ADMIN — DIPHENHYDRAMINE HCL 25 MG: 25 TABLET ORAL at 22:25

## 2019-08-09 RX ADMIN — INSULIN LISPRO 10 UNITS: 100 INJECTION, SOLUTION INTRAVENOUS; SUBCUTANEOUS at 17:54

## 2019-08-09 RX ADMIN — WARFARIN SODIUM 7.5 MG: 7.5 TABLET ORAL at 17:55

## 2019-08-09 RX ADMIN — METOPROLOL TARTRATE 25 MG: 25 TABLET, FILM COATED ORAL at 09:37

## 2019-08-09 RX ADMIN — TORSEMIDE 20 MG: 20 TABLET ORAL at 09:37

## 2019-08-09 RX ADMIN — METOPROLOL TARTRATE 25 MG: 25 TABLET, FILM COATED ORAL at 22:21

## 2019-08-09 RX ADMIN — FENTANYL CITRATE 25 MCG: 50 INJECTION, SOLUTION INTRAMUSCULAR; INTRAVENOUS at 14:24

## 2019-08-09 RX ADMIN — ATORVASTATIN CALCIUM 20 MG: 20 TABLET, FILM COATED ORAL at 09:37

## 2019-08-09 RX ADMIN — MIDAZOLAM 0.5 MG: 1 INJECTION INTRAMUSCULAR; INTRAVENOUS at 14:30

## 2019-08-09 RX ADMIN — MIDAZOLAM 0.5 MG: 1 INJECTION INTRAMUSCULAR; INTRAVENOUS at 14:17

## 2019-08-09 RX ADMIN — HYDROCODONE BITARTRATE AND ACETAMINOPHEN 2 TABLET: 5; 325 TABLET ORAL at 09:55

## 2019-08-09 RX ADMIN — HYDROCODONE BITARTRATE AND ACETAMINOPHEN 2 TABLET: 5; 325 TABLET ORAL at 22:25

## 2019-08-09 RX ADMIN — INSULIN GLARGINE 7 UNITS: 100 INJECTION, SOLUTION SUBCUTANEOUS at 22:20

## 2019-08-09 RX ADMIN — FENTANYL CITRATE 25 MCG: 50 INJECTION, SOLUTION INTRAMUSCULAR; INTRAVENOUS at 14:44

## 2019-08-09 RX ADMIN — INSULIN LISPRO 4 UNITS: 100 INJECTION, SOLUTION INTRAVENOUS; SUBCUTANEOUS at 09:38

## 2019-08-09 RX ADMIN — CLOTRIMAZOLE: 10 CREAM TOPICAL at 09:38

## 2019-08-09 RX ADMIN — FLUTICASONE FUROATE AND VILANTEROL TRIFENATATE 1 PUFF: 100; 25 POWDER RESPIRATORY (INHALATION) at 09:41

## 2019-08-09 RX ADMIN — HYDROCODONE BITARTRATE AND ACETAMINOPHEN 2 TABLET: 5; 325 TABLET ORAL at 17:56

## 2019-08-09 NOTE — ASSESSMENT & PLAN NOTE
Lab Results   Component Value Date    HGBA1C 9 4 (H) 12/27/2018       Recent Labs     08/08/19  0751 08/08/19  1143 08/08/19  1618 08/08/19  2111   POCGLU 204* 184* 320* 229*     · 07/31/2019: Left AKA  · Patient has completed the course of IV Vanco/Cefepime as per ID  · Monitor WBC and fever curve  · Continue Novolog/Lantus/sliding scale coverage  · Podiatry, ID general surgery on board  · Continue with current pain regimen

## 2019-08-09 NOTE — PROGRESS NOTES
[address] [facility] NOTE   Hang Tam 79 y o  male MRN: [id number]  Unit/Bed#: [address] [place] [address] Encounter: [id number]  Reason for Consult: DORA on CKD    ASSESSMENT and PLAN:    70-year-old male With a past medical history of AFib, hypertension, CHF, PVD, COPD,who initially presents on 07/25 with left lower extremity wound and pain  There is concern for diabetic foot ulcer and surgery and Podiatry team were brought on board  patient was started on broad-spectrum antibiotics  After evaluation, it was deemed that the patient would need a left lower extremity amputation  Infectious disease team and cardiology teams were also brought on board  Patient underwent AKA on July 3th  Nephrology was consulted on August 4th due to acute on chronic kidney injury     1) DORA on CKD      - stage III CKD baseline 1 25-1 38 mg/dL   - Cr rising to 2 1 mg/dL on 8/3   - improving to 1 9 mg/dL on 8/4 and 1 7 on 8/6/19 --> 1 54 mg/dL on 8/7 --> 1 7 on 8/8/19 --> 1 68 on 8/9/19  - Etiology likely pre renal with diuresis, obstruction ruled out on ultrasound, possibly ATN in the setting of infection/vancomycin with a level of 31 on 08/01   - UA - 8/4 is 0-1 RBC, 204 RBC, trace protein  - Urine Na - 81  - CXR - pulm vasc congestion, cardiac sillhouette enlargement  - PVR  - 0 cc  - renal u/s - no hydro; stable L renal cyst 1 3 cm;   - I/O; avoid nephrotoxic agents  - check BMP in AM  - f/u fec occ  - add amlodipine 5 mg if BP remains above 751 systolic    - message sent to renal office for f/u       2) HTN - BP stable   On metoprolol and torsemide     3) electrolytes -      - phos rising 5 8, but stable 5 3   - recheck in AM;      4) acid/base - stable     5) MBD      - cont holding phos binder for now --> check phos in AM  If rising, restart phos binder (holding phos binder as unclear source of skin rash)     6) Anemia      - low iron sat  - check stool occ     7) PVD with LE ulcer - s/p AKA     8) IgG Kappa      - Hematology on board  - bone scan unrevealing  - - FLC - elevated ratio 4 68 (kappa 317/lamda 68)  - biopsy of bone marrow pending  - alk phos elevated  F/u bone scan     9) rash - macular/slight papular, non blanching     - started on steroids per Primary team  - unclear etiology  - UA was relatively bland but consider vasculitic rash also  - check serological work up - ordered  - C3 133 - nl  - C4 - 43 - slightly elevated  - CRP - 29 - high  - RF negative  - UA bland  - UPCR 0 37  - COLLEEN - pending  - anti DS DNA - neg  - ANCA - pending     10) azotemia - awaiting fec occ  Also is on steroids now    SUBJECTIVE / INTERVAL HISTORY:    Pt denies complaints   No n/v      OBJECTIVE:  Current Weight: Weight - Scale: (!) 149 kg (328 lb 4 2 oz)  Vitals:    08/08/19 1551 08/08/19 2330 08/09/19 0600 08/09/19 0755   BP: 144/69 163/77  133/72   BP Location: Left arm   Left arm   Pulse: 67 60  68   Resp: 17 20  (!) 28   Temp: 98 6 °F (37 °C) 97 5 °F (36 4 °C)  (!) 97 4 °F (36 3 °C)   TempSrc: Oral Oral  Oral   SpO2: 92% 94%  93%   Weight:   (!) 149 kg (328 lb 4 2 oz)    Height:           Intake/Output Summary (Last 24 hours) at 8/9/2019 0810  Last data filed at 8/9/2019 0301  Gross per 24 hour   Intake --   Output 2725 ml   Net -2725 ml     General: NAD  Skin: + improving rash  Eyes: anicteric sclera  ENT: moist mucous membrane  Neck: supple  Chest: CTA b/l, no ronchii, no wheeze, no rubs, no rales  CVS: s1s2, no murmur, no gallop, no rub  Abdomen: soft, nontender, nl sounds  Extremities: RLE no edema;  : no mehta  Neuro: AAOX3  Psych: normal affect    Medications:    Current Facility-Administered Medications:     acetaminophen (TYLENOL) tablet 650 mg, 650 mg, Oral, Q6H PRN, Sandra Rack JOEY Sánchez    albuterol (PROVENTIL HFA,VENTOLIN HFA) inhaler 2 puff, 2 puff, Inhalation, Q6H PRN, Sandra Sánchez PA-C, 2 puff at 07/31/19 0749    atorvastatin (LIPITOR) tablet 20 mg, 20 mg, Oral, Daily, Suzette Sánchez PA-C, 20 mg at 08/08/19 6516    clotrimazole (LOTRIMIN) 1 % cream, , Topical, BID, Gill Hill MD    dextrose 5 % and sodium chloride 0 9 % infusion, 50 mL/hr, Intravenous, Continuous, Gill Hill MD, Last Rate: 50 mL/hr at 08/09/19 0031, 50 mL/hr at 08/09/19 0031    diphenhydrAMINE (BENADRYL) tablet 25 mg, 25 mg, Oral, Q8H PRN, Gill Hill MD, 25 mg at 08/05/19 0716    fluticasone-vilanterol (BREO ELLIPTA) 100-25 mcg/inh inhaler 1 puff, 1 puff, Inhalation, Daily, Suzette Sánchez PA-C, 1 puff at 08/08/19 0835    heparin (porcine) 25,000 units in 250 mL infusion (premix), 3-20 Units/kg/hr (Order-Specific), Intravenous, Titrated, NICOLA Carias, Last Rate: 18 9 mL/hr at 08/09/19 0806, 21 Units/kg/hr at 08/09/19 0806    heparin (porcine) injection 2,000 Units, 2,000 Units, Intravenous, PRN, NICOLA Marshall, 2,000 Units at 08/04/19 1449    heparin (porcine) injection 4,000 Units, 4,000 Units, Intravenous, PRN, NICOLA Marshall, 4,000 Units at 08/04/19 2304    hydrALAZINE (APRESOLINE) injection 10 mg, 10 mg, Intravenous, Q6H PRN, Gill Hill MD    HYDROcodone-acetaminophen (NORCO) 5-325 mg per tablet 2 tablet, 2 tablet, Oral, Q4H PRN, Torri Mora PA-C, 2 tablet at 08/08/19 1136    HYDROmorphone (DILAUDID) injection 0 5 mg, 0 5 mg, Intravenous, Q2H PRN, Dalila Mora PA-C, 0 5 mg at 08/05/19 2112    insulin glargine (LANTUS) subcutaneous injection 7 Units 0 07 mL, 7 Units, Subcutaneous, HS, Gill Hill MD, 7 Units at 08/08/19 2230    insulin lispro (HumaLOG) 100 units/mL subcutaneous injection 2-12 Units, 2-12 Units, Subcutaneous, TID AC, 8 Units at 08/08/19 1623 **AND** Fingerstick Glucose (POCT), , , TID AC, Suzette Sánchez PA-C    insulin lispro (HumaLOG) 100 units/mL subcutaneous injection 30 Units, 30 Units, Subcutaneous, TID With Meals, Oberlin Nette Sánchez PA-C, 30 Units at 08/08/19 1624    metoprolol tartrate (LOPRESSOR) tablet 25 mg, 25 mg, Oral, Q12H [facility], Suzette Sánchez PA-C, 25 mg at 08/08/19 2053    predniSONE tablet 40 mg, 40 mg, Oral, Daily, Leisa Garcia MD, 40 mg at 08/08/19 0830    torsemide (DEMADEX) tablet 20 mg, 20 mg, Oral, BID (diuretic), Leisa Garcia MD, 20 mg at 08/08/19 1613    warfarin (COUMADIN) tablet 10 mg, 10 mg, Oral, Daily (warfarin), Leisa Garcia MD, 10 mg at 08/08/19 1813    Laboratory Results:  Results from last 7 days   Lab Units 08/09/19  0537 08/09/19  0536 08/08/19  0517 08/07/19  0601 08/06/19  0448 08/05/19  0541 08/04/19  0546 08/03/19 2032 08/03/19  0537   WBC Thousand/uL 7 51  --   --  8 81  --  6 51 7 71  --  8 67   HEMOGLOBIN g/dL 9 2*  --   --  8 8*  --  8 4* 8 3*  --  8 2*   HEMATOCRIT % 31 2*  --   --  29 8*  --  28 8* 27 9*  --  27 9*   PLATELETS Thousands/uL 413*  --   --  368  --  332 325  --  310   POTASSIUM mmol/L  --  4 4 4 5 4 6 4 7 4 9 5 0 5 1 4 6   CHLORIDE mmol/L  --  102 100 101 102 104 103 101 103   CO2 mmol/L  --  25 27 26 28 24 26 27 25   BUN mg/dL  --  94* 81* 78* 74* 70* 65* 63* 54*   CREATININE mg/dL  --  1 68* 1 71* 1 54* 1 74* 1 76* 1 86* 2 14* 1 88*   CALCIUM mg/dL  --  9 1 8 8 9 3 8 4 8 6 8 4 8 4 8 7   MAGNESIUM mg/dL  --  1 7  --   --   --  2 2 2 0  --   --    PHOSPHORUS mg/dL  --  5 3* 5 2* 5 8* 4 6* 5 4* 4 8*  --   --

## 2019-08-09 NOTE — OCCUPATIONAL THERAPY NOTE
Occupational Therapy Cancellation Note    Patient Name: Ariel December  EYXIC'E Date: 8/9/2019    Pt on OT caseload  Pt to [facility] today for procedure  Pt was educated on OT home exercise program yesterday and provided with handout/theraband with instructions to perform 2-3X daily as able  Will followup with patient      [employer], OT

## 2019-08-09 NOTE — PLAN OF CARE
Problem: Potential for Falls  Goal: Patient will remain free of falls  Description  INTERVENTIONS:  - Assess patient frequently for physical needs  -  Identify cognitive and physical deficits and behaviors that affect risk of falls    -  [place] fall precautions as indicated by assessment   - Educate patient/family on patient safety including physical limitations  - Instruct patient to call for assistance with activity based on assessment  - Modify environment to reduce risk of injury  - Consider OT/PT consult to assist with strengthening/mobility  Outcome: Progressing     Problem: Prexisting or High Potential for Compromised Skin Integrity  Goal: Skin integrity is maintained or improved  Description  INTERVENTIONS:  - Identify patients at risk for skin breakdown  - Assess and monitor skin integrity  - Assess and monitor nutrition and hydration status  - Monitor labs (i e  albumin)  - Assess for incontinence   - Turn and reposition patient  - Assist with mobility/ambulation  - Relieve pressure over bony prominences  - Avoid friction and shearing  - Provide appropriate hygiene as needed including keeping skin clean and dry  - Evaluate need for skin moisturizer/barrier cream  - Collaborate with interdisciplinary team (i e  Nutrition, Rehabilitation, etc )   - Patient/family teaching  Outcome: Progressing     Problem: SKIN/TISSUE INTEGRITY - ADULT  Goal: Skin integrity remains intact  Description  INTERVENTIONS  - Identify patients at risk for skin breakdown  - Assess and monitor skin integrity  - Assess and monitor nutrition and hydration status  - Monitor labs (i e  albumin)  - Assess for incontinence   - Turn and reposition patient  - Assist with mobility/ambulation  - Relieve pressure over bony prominences  - Avoid friction and shearing  - Provide appropriate hygiene as needed including keeping skin clean and dry  - Evaluate need for skin moisturizer/barrier cream  - Collaborate with interdisciplinary team (i e  Nutrition, Rehabilitation, etc )   - Patient/family teaching  Outcome: Progressing  Goal: Incision(s), wounds(s) or drain site(s) healing without S/S of infection  Description  INTERVENTIONS  - Assess and document risk factors for skin impairment   - Assess and document dressing, incision, wound bed, drain sites and surrounding tissue  - Initiate Nutrition services consult and/or wound management as needed  Outcome: Progressing     Problem: MUSCULOSKELETAL - ADULT  Goal: Maintain or return mobility to safest level of function  Description  INTERVENTIONS:  - Assess patient's ability to carry out ADLs; assess patient's baseline for ADL function and identify physical deficits which impact ability to perform ADLs (bathing, care of mouth/teeth, toileting, grooming, dressing, etc )  - Assess/evaluate cause of self-care deficits   - Assess range of motion  - Assess patient's mobility; develop plan if impaired  - Assess patient's need for assistive devices and provide as appropriate  - Encourage maximum independence but intervene and supervise when necessary  - Involve family in performance of ADLs  - Assess for home care needs following discharge   - Request OT consult to assist with ADL evaluation and planning for discharge  - Provide patient education as appropriate  Outcome: Progressing     Problem: PAIN - ADULT  Goal: Verbalizes/displays adequate comfort level or baseline comfort level  Description  Interventions:  - Encourage patient to monitor pain and request assistance  - Assess pain using appropriate pain scale  - Administer analgesics based on type and severity of pain and evaluate response  - Implement non-pharmacological measures as appropriate and evaluate response  - Consider cultural and social influences on pain and pain management  - Notify physician/advanced practitioner if interventions unsuccessful or patient reports new pain  Outcome: Progressing     Problem: INFECTION - ADULT  Goal: Absence or prevention of progression during hospitalization  Description  INTERVENTIONS:  - Assess and monitor for signs and symptoms of infection  - Monitor lab/diagnostic results  - Monitor all insertion sites, i e  indwelling lines, tubes, and drains  - Monitor endotracheal (as able) and nasal secretions for changes in amount and color  - [place] appropriate cooling/warming therapies per order  - Administer medications as ordered  - Instruct and encourage patient and family to use good hand hygiene technique  - Identify and instruct in appropriate isolation precautions for identified infection/condition  Outcome: Progressing     Problem: SAFETY ADULT  Goal: Patient will remain free of falls  Description  INTERVENTIONS:  - Assess patient frequently for physical needs  -  Identify cognitive and physical deficits and behaviors that affect risk of falls    -  [place] fall precautions as indicated by assessment   - Educate patient/family on patient safety including physical limitations  - Instruct patient to call for assistance with activity based on assessment  - Modify environment to reduce risk of injury  - Consider OT/PT consult to assist with strengthening/mobility  Outcome: Progressing     Problem: DISCHARGE PLANNING  Goal: Discharge to home or other facility with appropriate resources  Description  INTERVENTIONS:  - Identify barriers to discharge w/patient and caregiver  - Arrange for needed discharge resources and transportation as appropriate  - Identify discharge learning needs (meds, wound care, etc )  - Arrange for interpretive services to assist at discharge as needed  - Refer to Case Management Department for coordinating discharge planning if the patient needs post-hospital services based on physician/advanced practitioner order or complex needs related to functional status, cognitive ability, or social support system  Outcome: Progressing     Problem: Knowledge Deficit  Goal: Patient/family/caregiver demonstrates understanding of disease process, treatment plan, medications, and discharge instructions  Description  Complete learning assessment and assess knowledge base  Interventions:  - Provide teaching at level of understanding  - Provide teaching via preferred learning methods  Outcome: Progressing     Problem: Nutrition/Hydration-ADULT  Goal: Nutrient/Hydration intake appropriate for improving, restoring or maintaining nutritional needs  Description  Monitor and assess patient's nutrition/hydration status for malnutrition (ex- brittle hair, bruises, dry skin, pale skin and conjunctiva, muscle wasting, smooth red tongue, and disorientation)  Collaborate with interdisciplinary team and initiate plan and interventions as ordered  Monitor patient's weight and dietary intake as ordered or per policy  Utilize nutrition screening tool and intervene per policy  Determine patient's food preferences and provide high-protein, high-caloric foods as appropriate       INTERVENTIONS:  - Monitor oral intake, urinary output, labs, and treatment plans  - Assess nutrition and hydration status and recommend course of action  - Evaluate amount of meals eaten  - Assist patient with eating if necessary   - Allow adequate time for meals  - Recommend/ encourage appropriate diets, oral nutritional supplements, and vitamin/mineral supplements  - Order, calculate, and assess calorie counts as needed  - Recommend, monitor, and adjust tube feedings and TPN/PPN based on assessed needs  - Assess need for intravenous fluids  - Provide specific nutrition/hydration education as appropriate  - Include patient/family/caregiver in decisions related to nutrition  Outcome: Progressing

## 2019-08-09 NOTE — TRANSPORTATION MEDICAL NECESSITY
Section I - General Information    Name of Patient: Jesus Chauhan                 : 1950    Medicare #: [id number]  Transport Date: 19 (PCS is valid for round trips on this date and for all repetitive trips in the 60-day range as noted below )  Origin: [place]: [address] [place]  Is the pt's stay covered under Medicare Part A (PPS/DRG)   []     Closest appropriate facility? If no, why is transport to more distant facility required? Yes  If hospice pt, is this transport related to pt's terminal illness? No      Section II - Medical Necessity Questionnaire  Ambulance transportation is medically necessary only if other means of transport are contraindicated or would be potentially harmful to the patient  To meet this requirement, the patient must either be "bed confined" or suffer from a condition such that transport by means other than ambulance is contraindicated by the patient's condition  The following questions must be answered by the medical professional signing below for this form to be valid:    1)  Describe the MEDICAL CONDITION (physical and/or mental) of this patient AT [address] [place] [address] that requires the patient to be transported in an ambulance and why transport by other means is contraindicated by the patient's condition: IR biopsy, L AKA  , fall risk    2) Is the patient "bed confined" as defined below? No  To be "be confined" the patient must satisfy all three of the following conditions: (1) unable to get up from bed without Assistance; AND (2) unable to ambulate; AND (3) unable to sit in a chair or wheelchair  3) Can this patient safely be transported by car or wheelchair van (i e , seated during transport without a medical attendant or monitoring)?    No    4) In addition to completing questions 1-3 above, please check any of the following conditions that apply*:   *Note: supporting documentation for any boxes checked must be maintained in the patient's medical records  If hosp-hosp transfer, describe services needed at 2nd facility not available at 1st facility? Special handling/isolation/infection control precautions required       Section III - Signature of Physician or Healthcare Professional  I certify that the above information is true and correct based on my evaluation of this patient, and represent that the patient requires transport by ambulance and that other forms of transport are contraindicated  I understand that this information will be used by the Centers for Medicare and Medicaid Services (CMS) to support the determination of medical necessity for ambulance services, and I represent that I have personal knowledge of the patient's condition at time of transport  []  If this box is checked, I also certify that the patient is physically or mentally incapable of signing the ambulance service's claim and that the institution with which I am affiliated has furnished care, services, or assistance to the patient  My signature below is made on behalf of the patient pursuant to 42 CFR §424 36(b)(4)  In accordance with 42 CFR §424 37, the specific reason(s) that the patient is physically or mentally incapable of signing the claim form is as follows:       Signature of Physician* or Healthcare Professional______________________________________________________________  Signature Date 08/09/19 (For scheduled repetitive transports, this form is not valid for transports performed more than 60 days after this date)    Printed Name & Credentials of Physician or Healthcare Professional (MD, DO, RN, etc )________________________________  *Form must be signed by patient's attending physician for scheduled, repetitive transports   For non-repetitive, unscheduled ambulance transports, if unable to obtain the signature of the attending physician, any of the following may sign (choose appropriate option below)  [] Physician Assistant []  Clinical Nurse Specialist []  Registered Nurse  []  Nurse Practitioner  [] Discharge Planner

## 2019-08-09 NOTE — PROGRESS NOTES
Progress Note - Hernan Blair 1950, 79 y o  male MRN: [id number]    Unit/Bed#: [address] [place] [address] Encounter: [id number]    Primary Care Provider: Refugio Azul MD   Date and time admitted to hospital: 7/25/2019  1:04 PM        Biclonal gammopathy  Assessment & Plan  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  Multiple myeloma workup is pending  Hematology consult appreciated  Patient is scheduled for bone marrow biopsy by IR today at [facility]  Skeletal survey, IgG, IgA, IgM levels to be drawn along with beta 2 microglobulin were ordered  Rash  Assessment & Plan  ? Etiology  Continue on prednisone  Serologic workup was ordered  Continue Benadryl p r n  Rash is improving  No need for skin biopsy at this point  Will continue to monitor  Acute renal failure superimposed on stage 3 chronic kidney disease (HCC)  Assessment & Plan  · Creatinine 1 68 today from baseline of 1 2-1 4  · Nephrology follow-up  · Ultrasound renals = No evidence of nephrolithiasis or hydronephrosis  Stable left renal cyst measuring 1 3 cm  Unremarkable bladder  · Bladder scan and PVRs  · Avoid hypotension/NSAIDs    Moderate persistent asthma without complication  Assessment & Plan  · cont albuterol/Advair    Acute on chronic diastolic congestive heart failure ([address] )  Assessment & Plan  · Patient had volume overload post surgery  Was diuresed with IV Lasix initially and switched to torsemide  · Daily weight and I&Os  · Continue on torsemide  · Continue beta-blockers/Aldactone/atorvastatin      Weight (last 2 days)     Date/Time   Weight    08/09/19 0600   (!) 149 (328 27)    08/08/19 0600   (!) 152 (335 32)    08/07/19 0600   (!) 153 (337 3)              Intake/Output Summary (Last 24 hours) at 8/9/2019 0905  Last data filed at 8/9/2019 0301  Gross per 24 hour   Intake --   Output 2450 ml   Net -2450 ml       Morbid obesity ([address] )  Assessment & Plan  · Encourage weight loss    Chronic atrial fibrillation [facility])  Assessment & Plan  · Patient has been on heparin drip and Coumadin on 08/01/2019 after discussion with surgery  Overlap until INR is therapeutic  · Continue Lopressor  · Cardiology on board    Essential hypertension  Assessment & Plan  · continue torsemide/ beta-blocker/Aldactone  · Cozaar on hold    Chronic venous stasis dermatitis of both lower extremities  Assessment & Plan  · Chronic, POA  · S/P left AKA  · podiatry/wound care  * Type 2 diabetes mellitus with left diabetic foot ulcer [facility])  Assessment & Plan  Lab Results   Component Value Date    HGBA1C 9 4 (H) 12/27/2018       Recent Labs     08/08/19  0751 08/08/19  1143 08/08/19  1618 08/08/19  2111   POCGLU 204* 184* 320* 229*     · 07/31/2019: Left AKA  · Patient has completed the course of IV Vanco/Cefepime as per ID  · Monitor WBC and fever curve  · Continue Novolog/Lantus/sliding scale coverage  · Podiatry, ID general surgery on board  · Continue with current pain regimen  Labs & Imaging: I have personally reviewed pertinent reports  VTE Pharmacologic Prophylaxis: Heparin and Warfarin (Coumadin)  VTE Mechanical Prophylaxis: sequential compression device    Code Status:   Level 1 - Full Code    Patient Centered Rounds: I have performed bedside rounds with nursing staff today  Discussions with Specialists or Other Care Team Provider:  Nephrology    Education and Discussions with Family / Patient:  Yes    Current Length of Stay: 15 day(s)    Current Patient Status: Inpatient   Certification Statement: The patient will continue to require additional inpatient hospital stay due to see my assessment and plan  Subjective:   Patient is seen and examined at bedside  Denies any new complaints  Skin rash is improving  Afebrile  No other complaints  Patient is scheduled for bone marrow biopsy today  All other ROS are negative      Objective:    Vitals: Blood pressure 133/72, pulse 68, temperature (!) 97 4 °F (36 3 °C), temperature source Oral, resp  rate (!) 28, height 6' 3" (1 905 m), weight (!) 149 kg (328 lb 4 2 oz), SpO2 93 %  ,Body mass index is 41 03 kg/m²  SPO2 RA Rest      ED to Hosp-Admission (Current) from 7/25/2019 in [facility] Surg Unit   SpO2  93 %   SpO2 Activity  At Rest   O2 Device  None (Room air)   O2 Flow Rate  2 L/min        I&O:     Intake/Output Summary (Last 24 hours) at 8/9/2019 0908  Last data filed at 8/9/2019 0301  Gross per 24 hour   Intake --   Output 2450 ml   Net -2450 ml       Physical Exam:    General- Alert, lying comfortably in bed  Not in any acute distress  HEENT- JENNIE, EOM intact  Neck- Supple, No JVD  CVS- regular, S1 and S2 normal   Chest- Bilateral Air entry, No rhochi, crackles or wheezing present  Abdomen- soft, nontender, not distended, no guarding or rigidity, BS+  Extremities-  No pedal edema, No calf tenderness  Skin- rash is improving  CNS-   Alert, awake and orientedx3  No focal deficits present      Invasive Devices     Peripherally Inserted Central Catheter Line            PICC Line 61/13/27 Right Basilic 10 days                      Social History  reviewed  Family History   Problem Relation Age of Onset    Other Mother         CARDIAC DISORDER     Diabetes Mother     Cancer Father     Other Family         CARDIAC DISORDER     Diabetes Family     Cancer Family     Mental illness Family     reviewed    Meds:  Current Facility-Administered Medications   Medication Dose Route Frequency Provider Last Rate Last Dose    acetaminophen (TYLENOL) tablet 650 mg  650 mg Oral Q6H PRN Suzette Sánchez PA-C        albuterol (PROVENTIL HFA,VENTOLIN HFA) inhaler 2 puff  2 puff Inhalation Q6H PRN LESLEY Cortés-SHANEKA   2 puff at 07/31/19 0749    atorvastatin (LIPITOR) tablet 20 mg  20 mg Oral Daily LESLEY Cortés-SHANEKA   20 mg at 08/08/19 0828    clotrimazole (LOTRIMIN) 1 % cream   Topical BID Shawn Zayas MD        dextrose 5 % and sodium chloride 0 9 % infusion  50 mL/hr Intravenous Continuous Dawn Dawkins MD 50 mL/hr at 08/09/19 0031 50 mL/hr at 08/09/19 0031    diphenhydrAMINE (BENADRYL) tablet 25 mg  25 mg Oral Q8H PRN Dawn Dawkins MD   25 mg at 08/05/19 0716    fluticasone-vilanterol (BREO ELLIPTA) 100-25 mcg/inh inhaler 1 puff  1 puff Inhalation Daily Suzette Sánchez PA-C   1 puff at 08/08/19 0835    heparin (porcine) 25,000 units in 250 mL infusion (premix)  3-20 Units/kg/hr (Order-Specific) Intravenous Titrated NICOLA Miller 18 9 mL/hr at 08/09/19 0806 21 Units/kg/hr at 08/09/19 0806    heparin (porcine) injection 2,000 Units  2,000 Units Intravenous PRN AZUL MillerNP   2,000 Units at 08/04/19 1449    heparin (porcine) injection 4,000 Units  4,000 Units Intravenous PRN NICOLA Miller   4,000 Units at 08/04/19 2304    hydrALAZINE (APRESOLINE) injection 10 mg  10 mg Intravenous Q6H PRN Dawn Dawkins MD        HYDROcodone-acetaminophen (NORCO) 5-325 mg per tablet 2 tablet  2 tablet Oral Q4H PRN Amna London PA-C   2 tablet at 08/08/19 1136    HYDROmorphone (DILAUDID) injection 0 5 mg  0 5 mg Intravenous Q2H PRN Dalila Mora PA-C   0 5 mg at 08/09/19 0813    insulin glargine (LANTUS) subcutaneous injection 7 Units 0 07 mL  7 Units Subcutaneous HS Dawn Dawkins MD   7 Units at 08/08/19 2230    insulin lispro (HumaLOG) 100 units/mL subcutaneous injection 2-12 Units  2-12 Units Subcutaneous TID AC Suzette Sánchez PA-C   8 Units at 08/08/19 1623    insulin lispro (HumaLOG) 100 units/mL subcutaneous injection 30 Units  30 Units Subcutaneous TID With Meals Suzette Sánchez PA-C   30 Units at 08/08/19 1624    metoprolol tartrate (LOPRESSOR) tablet 25 mg  25 mg Oral Q12H [address] Suzette Sánchez PA-C   25 mg at 08/08/19 2053    predniSONE tablet 40 mg  40 mg Oral Daily Dawn Dawkins MD   40 mg at 08/08/19 0830    torsemide (DEMADEX) tablet 20 mg  20 mg Oral BID (diuretic) Matthew Carr MD   20 mg at 08/08/19 1613    warfarin (COUMADIN) tablet 10 mg  10 mg Oral Daily (warfarin) Matthew Carr MD   10 mg at 08/08/19 1813      Medications Prior to Admission   Medication    albuterol (PROVENTIL HFA,VENTOLIN HFA) 90 mcg/act inhaler    allopurinol (ZYLOPRIM) 300 mg tablet    atorvastatin (LIPITOR) 20 mg tablet    BD INSULIN SYRINGE U/F 31G X 5/16" 0 5 ML MISC    BD PEN NEEDLE HILARIO U/F 32G X 4 MM MISC    fluticasone-salmeterol (ADVAIR DISKUS, WIXELA INHUB) 250-50 mcg/dose inhaler    glucose blood (ONE TOUCH ULTRA TEST) test strip    insulin aspart (NOVOLOG FLEXPEN) 100 Units/mL injection pen    Insulin Pen Needle 31G X 5 MM MISC    losartan (COZAAR) 50 mg tablet    metFORMIN (GLUCOPHAGE) 1000 MG tablet    metoprolol tartrate (LOPRESSOR) 25 mg tablet    torsemide (DEMADEX) 20 mg tablet    warfarin (COUMADIN) 5 mg tablet       Labs:  Results from last 7 days   Lab Units 08/09/19  0537 08/07/19  0601 08/05/19  0541   WBC Thousand/uL 7 51 8 81 6 51   HEMOGLOBIN g/dL 9 2* 8 8* 8 4*   HEMATOCRIT % 31 2* 29 8* 28 8*   PLATELETS Thousands/uL 413* 368 332   NEUTROS PCT % 72 75 57   LYMPHS PCT % 19 15 23   MONOS PCT % 6 6 9   EOS PCT % 0 1 5     Results from last 7 days   Lab Units 08/09/19  0536 08/08/19  0517 08/07/19  0601   POTASSIUM mmol/L 4 4 4 5 4 6   CHLORIDE mmol/L 102 100 101   CO2 mmol/L 25 27 26   BUN mg/dL 94* 81* 78*   CREATININE mg/dL 1 68* 1 71* 1 54*   CALCIUM mg/dL 9 1 8 8 9 3   ALK PHOS U/L  --  150*  --    ALT U/L  --  19  --    AST U/L  --  21  --      Lab Results   Component Value Date    TROPONINI <0 02 03/23/2018    TROPONINI <0 02 10/08/2017    CKTOTAL 53 03/23/2018    CKTOTAL 55 10/08/2017     Results from last 7 days   Lab Units 08/09/19  0536 08/08/19  0518 08/07/19 0618   INR  1 81* 1 58* 1 37*     Lab Results   Component Value Date    BLOODCX No Growth After 5 Days  07/25/2019    BLOODCX No Growth After 5 Days   07/25/2019    BLOODCX No Growth After 5 Days  12/25/2018    WOUNDCULT 4+ Growth of Proteus mirabilis (A) 07/25/2019    WOUNDCULT 4+ Growth of  07/25/2019    WOUNDCULT (A) 07/25/2019     4+ Growth of Methicillin Resistant Staphylococcus aureus    WOUNDCULT 1+ Growth of Proteus mirabilis (A) 07/25/2019    WOUNDCULT 4+ Growth of  07/25/2019         Imaging:  Results for orders placed during the hospital encounter of 07/25/19   XR chest portable    Narrative CHEST     INDICATION:   chf     COMPARISON:  Chest radiograph 7/29/2019    EXAM PERFORMED/VIEWS:  XR CHEST PORTABLE      FINDINGS:  Right-sided PICC line with tip in the region of the cavoatrial junction  Heart shadow is enlarged but unchanged from prior exam     There is pulmonary vascular congestion  Streaky bibasilar opacities  No large effusion or pneumothorax  Osseous structures appear within normal limits for patient age  Impression 1  Stable enlargement of the cardiac silhouette and pulmonary vascular congestion  2   Bibasilar linear opacities compatible with atelectasis or scarring  Workstation performed: [id number]       Results for orders placed in visit on 05/02/19   XR chest pa & lateral    Narrative CHEST     INDICATION:   J45 40: Moderate persistent asthma, uncomplicated  X36 08: Dyspnea, unspecified  COMPARISON:  One view chest 3/23/2018    EXAM PERFORMED/VIEWS:  XR CHEST PA & LATERAL      FINDINGS:    Cardiac silhouette is enlarged  Aortic calcification is present  Minimal bibasilar subsegmental atelectasis left greater than right  No pneumothorax, or pleural effusion  Mild distention of central pulmonary vessels  Multilevel thoracolumbar spondylosis  Impression Mild central pulmonary vascular congestion  Minimal bibasilar subsegmental atelectasis left greater than right          Workstation performed: [id number]         Last 24 Hours Medication List:     Current Facility-Administered Medications:  acetaminophen 650 mg Oral Q6H PRN Cherri Sánchez PA-C    albuterol 2 puff Inhalation Q6H PRN Cherri Sánchez PA-C    atorvastatin 20 mg Oral Daily Suzette Sánchez PA-C    clotrimazole  Topical BID Dawn Dawkins MD    dextrose 5 % and sodium chloride 0 9 % 50 mL/hr Intravenous Continuous Dawn Dawkins MD Last Rate: 50 mL/hr (08/09/19 0031)   diphenhydrAMINE 25 mg Oral Q8H PRN Dawn Dawkins MD    fluticasone-vilanterol 1 puff Inhalation Daily Suzette Sánchez PA-C    heparin (porcine) 3-20 Units/kg/hr (Order-Specific) Intravenous Titrated NICOLA Miller Last Rate: 21 Units/kg/hr (08/09/19 0806)   heparin (porcine) 2,000 Units Intravenous PRN NICOLA Miller    heparin (porcine) 4,000 Units Intravenous PRN NICOLA Miller    hydrALAZINE 10 mg Intravenous Q6H PRN Dawn Dawkins MD    HYDROcodone-acetaminophen 2 tablet Oral Q4H PRN Dalila Mora PA-C    HYDROmorphone 0 5 mg Intravenous Q2H PRN Dailla Mora PA-C    insulin glargine 7 Units Subcutaneous HS Dawn Dawkins MD    insulin lispro 2-12 Units Subcutaneous TID AC Suzette Sánchez PA-C    insulin lispro 30 Units Subcutaneous TID With Meals Cherri Sánchez PA-C    metoprolol tartrate 25 mg Oral Q12H [address] Suzette Sánchez PA-C    predniSONE 40 mg Oral Daily Dawn Dawkins MD    torsemide 20 mg Oral BID (diuretic) Dawn Dawkins MD    warfarin 10 mg Oral Daily (warfarin) Dawn Dawkins MD         Today, Patient Was Seen By: Dawn Dawkins MD    ** Please Note: Dictation voice to text software may have been used in the creation of this document   **

## 2019-08-09 NOTE — ASSESSMENT & PLAN NOTE
?  Etiology  Continue on prednisone  Serologic workup was ordered  Continue Benadryl p r n  Rash is improving  No need for skin biopsy at this point  Will continue to monitor

## 2019-08-09 NOTE — H&P
Interventional Radiology Preprocedure Note    History/Indication for procedure:   Zakia Mcmillan is a 79 y o  male with anemia    Relevant past medical history:    Past Medical History:   Diagnosis Date    CHF (congestive heart failure) ([place] [address] )     COPD [facility]chronic obstructive [facility]) [facility]     Decubitus ulcer of heel     LAST ASSESSED [id number]    Diabetes mellitus ([facility] )     History of varicose veins     Hypertension     Intermittent claudication ([facility])     Neuropathy     Seasonal allergies      Patient Active Problem List   Diagnosis    Diabetic foot ulcer ([place] [address] )    Diabetes mellitus type 2, uncontrolled ([place] [address] )    Chronic venous stasis dermatitis of both lower extremities    Essential hypertension    Chronic atrial fibrillation (HCC)    Morbid obesity ([facility]    Acute on chronic diastolic congestive heart failure ([facility])    Cardiomyopathy ([facility])    Diabetes, polyneuropathy ([place] [address] )    GERD (gastroesophageal reflux disease)    Hyperlipidemia    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, right ([facility] )    Varicose veins of lower extremity with ulcer, left ([facility])    Onychomycosis    Gallstones    Blood alkaline phosphatase increased compared with prior measurement    Chronic heel ulcer, left, with fat layer exposed ([place] [address] )    Localized edema    Diabetic ulcer of toe of left foot associated with type 2 diabetes mellitus, limited to breakdown of skin ([facility] )    Peripheral vascular disease ([facility]  )    NALLELY (obstructive sleep apnea)    Moderate persistent asthma without complication    Type 2 diabetes mellitus with left diabetic foot ulcer (HCC)    Acute renal failure superimposed on stage 3 chronic kidney disease ([facility])    Skin ulcer of left foot with necrosis of muscle ([facility])    Rash    Biclonal gammopathy       Medications:    Inpatient Medications:     Scheduled Medications:    Current Facility-Administered Medications:  acetaminophen 650 mg Oral Q6H PRN Margaux Sánchez PA-C albuterol 2 puff Inhalation Q6H PRN [place], PA-C    atorvastatin 20 mg Oral Daily Suzette Sánchez PA-C    calcium acetate 667 mg Oral TID With Meals Kimmy Marquez MD    clotrimazole  Topical BID Kimmy Marquez MD    dextrose 5 % and sodium chloride 0 9 % 50 mL/hr Intravenous Continuous Kimmy Marquez MD Last Rate: Stopped (08/09/19 1227)   diphenhydrAMINE 25 mg Oral Q8H PRN Kimmy Marquez MD    fluticasone-vilanterol 1 puff Inhalation Daily Suzette Sánchez PA-C    heparin (porcine) 3-20 Units/kg/hr (Order-Specific) Intravenous Titrated NICOLA Borrero Last Rate: Stopped (08/09/19 1228)   heparin (porcine) 2,000 Units Intravenous PRN NICOLA Borrero    heparin (porcine) 4,000 Units Intravenous PRN NICOLA Borrero    hydrALAZINE 10 mg Intravenous Q6H PRN Kimmy Marquez MD    HYDROcodone-acetaminophen 2 tablet Oral Q4H PRN Dalila Mora PA-C    HYDROmorphone 0 5 mg Intravenous Q2H PRN Dalila Mora PA-C    insulin glargine 7 Units Subcutaneous HS Kimmy Marquez MD    insulin lispro 2-12 Units Subcutaneous TID AC Suzette Sánchez PA-C    insulin lispro 30 Units Subcutaneous TID With Meals [place] [facility]l-Reimer, PA-C    metoprolol tartrate 25 mg Oral Q12H [address] Suzette Sánchez PA-C    predniSONE 40 mg Oral Daily Kimmy Marquez MD    torsemide 20 mg Oral BID (diuretic) Kimmy Marquez MD    warfarin 7 5 mg Oral Daily (warfarin) Kimmy Marquez MD        Infusions:    dextrose 5 % and sodium chloride 0 9 % 50 mL/hr Last Rate: Stopped (08/09/19 1227)   heparin (porcine) 3-20 Units/kg/hr (Order-Specific) Last Rate: Stopped (08/09/19 1228)       PRN:    acetaminophen    albuterol    diphenhydrAMINE    heparin (porcine)    heparin (porcine)    hydrALAZINE    HYDROcodone-acetaminophen    HYDROmorphone    Outpatient Medications:  No current facility-administered medications on file prior to encounter        Current Outpatient Medications on File Prior to Encounter   Medication Sig Dispense Refill    albuterol (PROVENTIL HFA,VENTOLIN HFA) 90 mcg/act inhaler Inhale 2 puffs every 6 (six) hours as needed for wheezing 1 Inhaler 0    allopurinol (ZYLOPRIM) 300 mg tablet TAKE 1 TABLET BY MOUTH DAILY 90 tablet 1    atorvastatin (LIPITOR) 20 mg tablet TAKE 1 TABLET BY MOUTH ONCE DAILY 90 tablet 0    BD INSULIN SYRINGE U/F 31G X 5/16" 0 5 ML MISC USE AS DIRECTED WITH NOVOLIN 70/30  0    BD PEN NEEDLE HILARIO U/F 32G X 4 MM MISC by Other route 3 (three) times a day 300 each 1    fluticasone-salmeterol (ADVAIR DISKUS, WIXELA INHUB) 250-50 mcg/dose inhaler Inhale 1 puff 2 (two) times a day Rinse mouth after use  1 Inhaler 5    glucose blood (ONE TOUCH ULTRA TEST) test strip Test TID  DX:  E11 9 50 each 5    insulin aspart (NOVOLOG FLEXPEN) 100 Units/mL injection pen Inject 30 Units under the skin 3 (three) times a day with meals 15 pen 1    Insulin Pen Needle 31G X 5 MM MISC by Does not apply route 2 (two) times a day for 50 days 100 each 0    losartan (COZAAR) 50 mg tablet TAKE 1 TABLET BY MOUTH EVERY DAY 30 tablet 5    metFORMIN (GLUCOPHAGE) 1000 MG tablet TAKE 1 TABLET BY MOUTH TWICE A DAY WITH MEALS 120 tablet 2    metoprolol tartrate (LOPRESSOR) 25 mg tablet TAKE 1 TABLET BY MOUTH EVERY 12 HOURS 180 tablet 3    torsemide (DEMADEX) 20 mg tablet TAKE 1 TABLET BY MOUTH TWICE A DAY 60 tablet 2    warfarin (COUMADIN) 5 mg tablet TAKE 1-2 TABLETS DAILY OR AS DIRECTED BY MD  60 tablet 3       Allergies   Allergen Reactions    Latex Rash       Anticoagulants: none    ASA classification: ASA 3 - Patient with moderate systemic disease with functional limitations    Airway Assessment: III (soft and hard palate and base of uvula visible)    Relevant family history: None    Relevant review of systems: None    Prior sedation/anesthesia: yes    Can the patient lie flat? Yes     Does patient have sleep apnea? No    If yes, does patient use CPAP? no    NPO Status: yes    Labs:   CBC with diff: Lab Results   Component Value Date    WBC 7 51 08/09/2019    HGB 9 2 (L) 08/09/2019    HCT 31 2 (L) 08/09/2019    MCV 91 08/09/2019     (H) 08/09/2019    MCH 26 9 08/09/2019    MCHC 29 5 (L) 08/09/2019    RDW 17 3 (H) 08/09/2019    MPV 10 1 08/09/2019    NRBC 0 08/09/2019     BMP/CMP:  Lab Results   Component Value Date     01/15/2018    K 4 4 08/09/2019    K 4 7 01/23/2018     08/09/2019    CL 98 01/23/2018    CO2 25 08/09/2019    CO2 29 01/23/2018    ANIONGAP 11 12/22/2015    BUN 94 (H) 08/09/2019    BUN 27 (H) 01/23/2018    CREATININE 1 68 (H) 08/09/2019    CREATININE 0 99 01/15/2018    GLUCOSE 139 12/22/2015    CALCIUM 9 1 08/09/2019    CALCIUM 8 9 01/23/2018    AST 21 08/08/2019    AST 13 01/15/2018    ALT 19 08/08/2019    ALT 13 01/15/2018    ALKPHOS 150 (H) 08/08/2019    ALKPHOS 199 (H) 01/23/2018    PROT 7 1 01/15/2018    BILITOT 0 6 01/15/2018    EGFR 42 08/09/2019   ,     Coags:   Lab Results   Component Value Date    PTT 61 (H) 08/09/2019    PTT 32 07/01/2015    INR 1 81 (H) 08/09/2019    INR 1 06 07/01/2015   ,   Results from last 7 days   Lab Units 08/09/19  0536 08/08/19  1209 08/08/19  0518 08/07/19  0618 08/06/19  0448  08/05/19  0541   PTT seconds 61* 47* 48* 62* 66*   < >  --    INR  1 81*  --  1 58* 1 37* 1 20*  --  1 14    < > = values in this interval not displayed  Relevant imaging studies:   Reviewed    Directed physical examination:  NAD  CTAB  RRR  Pulses intact    Assessment/Plan:   CT guided Bone Marrow Biopsy    Sedation/Anesthesia plan: Moderate sedation will be used as needed for procedure      Consent with alternatives to the procedure, risks and benefits have been explained and discussed with the patient/patient's family: yes

## 2019-08-09 NOTE — BRIEF OP NOTE (RAD/CATH)
Procedure Note    PATIENT NAME: Shay Martinez  : 1950  MRN: [id number]     Pre-op Diagnosis:   1  Left leg cellulitis    2  Skin ulcer of left foot with necrosis of muscle ([address] )    3  Insulin dependent diabetes mellitus ([address] )    4  Chronic atrial fibrillation (HCC)    5  Diabetic ulcer of right heel associated with type 2 diabetes mellitus, limited to breakdown of skin ([address] )    6  Chronic diastolic congestive heart failure (HCC)    7  Cardiomyopathy, unspecified type ([address] )    8  Preoperative clearance    9  Acute renal failure superimposed on stage 3 chronic kidney disease, unspecified acute renal failure type ([address] )    10  Rash      Post-op Diagnosis:   1  Left leg cellulitis    2  Skin ulcer of left foot with necrosis of muscle ([address] )    3  Insulin dependent diabetes mellitus ([address] )    4  Chronic atrial fibrillation (HCC)    5  Diabetic ulcer of right heel associated with type 2 diabetes mellitus, limited to breakdown of skin ([address] )    6  Chronic diastolic congestive heart failure (HCC)    7  Cardiomyopathy, unspecified type ([address] )    8  Preoperative clearance    9  Acute renal failure superimposed on stage 3 chronic kidney disease, unspecified acute renal failure type ([address] )    10   Rash        Surgeon:   Celina Galeano MD  Assistants:     No qualified resident was available, Resident is only observing    Estimated Blood Loss: Minimal  Findings: Successful CT guided bone marrow biopsy    Specimens: Bone Marrow Aspirates and Cores    Complications:  None    Anesthesia: Conscious sedation    Celina Galeano MD     Date: 2019  Time: 2:51 PM

## 2019-08-09 NOTE — ASSESSMENT & PLAN NOTE
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  Multiple myeloma workup is pending  Hematology consult appreciated  Patient is scheduled for bone marrow biopsy by IR today at [facility], IgG, IgA, IgM levels to be drawn along with beta 2 microglobulin were ordered

## 2019-08-09 NOTE — ASSESSMENT & PLAN NOTE
· Patient had volume overload post surgery  Was diuresed with IV Lasix initially and switched to torsemide  · Daily weight and I&Os  · Continue on torsemide  · Continue beta-blockers/Aldactone/atorvastatin      Weight (last 2 days)     Date/Time   Weight    08/09/19 0600   (!) 149 (328 27)    08/08/19 0600   (!) 152 (335 32)    08/07/19 0600   (!) 153 (337 3)              Intake/Output Summary (Last 24 hours) at 8/9/2019 0905  Last data filed at 8/9/2019 0301  Gross per 24 hour   Intake --   Output 2450 ml   Net -2450 ml

## 2019-08-09 NOTE — DISCHARGE INSTRUCTIONS
Bone Marrow Biopsy     WHAT YOU NEED TO KNOW:   A bone marrow biopsy is a procedure to remove a small amount of bone marrow from your bone  Bone marrow is the soft tissue inside your bone that helps to make blood cells  The sample is tested for disease or infection  DISCHARGE INSTRUCTIONS:     1  Limit your activities day of biopsy as directed by your doctor  2  Use medication as ordered  3  Return to your normal diet  Small sips of flat soda will help with nausea  4  Remove band-aid or dressing 24 hours after procedure  Contact Interventional Radiology at [phone] [place] PATIENTS: Contact Interventional Radiology at [phone]) [employer] PATIENTS: Contact Interventional Radiology at [phone]) if:    1  Difficulty breathing, nausea or vomiting  2  Chills or fever above 101 F     3  Pain at biopsy site not relieved by medication  4  Develop any redness, swelling, heat, unusual drainage, heavy bruising or bleeding from biopsy site         WOUND CARE:    Monitor AKA site  Continue compressive dressing with ace bandage until edema improves  After that tome , no dressing requires, unless for comfort or protection of staple line  May wash wound with soap and water, no salved, detergents or lotions to wound site  Non-weight bearing  Continue PT/OT

## 2019-08-09 NOTE — ASSESSMENT & PLAN NOTE
· Creatinine 1 68 today from baseline of 1 2-1 4  · Nephrology follow-up  · Ultrasound renals = No evidence of nephrolithiasis or hydronephrosis  Stable left renal cyst measuring 1 3 cm  Unremarkable bladder    · Bladder scan and PVRs  · Avoid hypotension/NSAIDs

## 2019-08-10 LAB
ANION GAP SERPL CALCULATED.3IONS-SCNC: 13 MMOL/L (ref 4–13)
APTT PPP: 75 SECONDS (ref 23–37)
APTT PPP: 88 SECONDS (ref 23–37)
BASOPHILS # BLD AUTO: 0.03 THOUSANDS/ΜL (ref 0–0.1)
BASOPHILS NFR BLD AUTO: 0 % (ref 0–1)
BUN SERPL-MCNC: 92 MG/DL (ref 5–25)
CALCIUM SERPL-MCNC: 9.3 MG/DL (ref 8.3–10.1)
CHLORIDE SERPL-SCNC: 101 MMOL/L (ref 100–108)
CO2 SERPL-SCNC: 24 MMOL/L (ref 21–32)
CREAT SERPL-MCNC: 1.54 MG/DL (ref 0.6–1.3)
EOSINOPHIL # BLD AUTO: 0.02 THOUSAND/ΜL (ref 0–0.61)
EOSINOPHIL NFR BLD AUTO: 0 % (ref 0–6)
ERYTHROCYTE [DISTWIDTH] IN BLOOD BY AUTOMATED COUNT: 17.1 % (ref 11.6–15.1)
GFR SERPL CREATININE-BSD FRML MDRD: 46 ML/MIN/1.73SQ M
GLUCOSE SERPL-MCNC: 179 MG/DL (ref 65–140)
GLUCOSE SERPL-MCNC: 200 MG/DL (ref 65–140)
GLUCOSE SERPL-MCNC: 206 MG/DL (ref 65–140)
GLUCOSE SERPL-MCNC: 237 MG/DL (ref 65–140)
GLUCOSE SERPL-MCNC: 338 MG/DL (ref 65–140)
HCT VFR BLD AUTO: 32.6 % (ref 36.5–49.3)
HGB BLD-MCNC: 9.7 G/DL (ref 12–17)
IMM GRANULOCYTES # BLD AUTO: 0.19 THOUSAND/UL (ref 0–0.2)
IMM GRANULOCYTES NFR BLD AUTO: 2 % (ref 0–2)
INR PPP: 2.13 (ref 0.84–1.19)
LYMPHOCYTES # BLD AUTO: 1.65 THOUSANDS/ΜL (ref 0.6–4.47)
LYMPHOCYTES NFR BLD AUTO: 19 % (ref 14–44)
MCH RBC QN AUTO: 27 PG (ref 26.8–34.3)
MCHC RBC AUTO-ENTMCNC: 29.8 G/DL (ref 31.4–37.4)
MCV RBC AUTO: 91 FL (ref 82–98)
MONOCYTES # BLD AUTO: 0.56 THOUSAND/ΜL (ref 0.17–1.22)
MONOCYTES NFR BLD AUTO: 6 % (ref 4–12)
NEUTROPHILS # BLD AUTO: 6.36 THOUSANDS/ΜL (ref 1.85–7.62)
NEUTS SEG NFR BLD AUTO: 73 % (ref 43–75)
NRBC BLD AUTO-RTO: 0 /100 WBCS
PHOSPHATE SERPL-MCNC: 5 MG/DL (ref 2.3–4.1)
PLATELET # BLD AUTO: 410 THOUSANDS/UL (ref 149–390)
PMV BLD AUTO: 10 FL (ref 8.9–12.7)
POTASSIUM SERPL-SCNC: 4.4 MMOL/L (ref 3.5–5.3)
PROTHROMBIN TIME: 23.5 SECONDS (ref 11.6–14.5)
RBC # BLD AUTO: 3.59 MILLION/UL (ref 3.88–5.62)
SODIUM SERPL-SCNC: 138 MMOL/L (ref 136–145)
WBC # BLD AUTO: 8.81 THOUSAND/UL (ref 4.31–10.16)

## 2019-08-10 PROCEDURE — 94760 N-INVAS EAR/PLS OXIMETRY 1: CPT

## 2019-08-10 PROCEDURE — 82948 REAGENT STRIP/BLOOD GLUCOSE: CPT

## 2019-08-10 PROCEDURE — 85730 THROMBOPLASTIN TIME PARTIAL: CPT | Performed by: NURSE PRACTITIONER

## 2019-08-10 PROCEDURE — 85025 COMPLETE CBC W/AUTO DIFF WBC: CPT | Performed by: INTERNAL MEDICINE

## 2019-08-10 PROCEDURE — 99232 SBSQ HOSP IP/OBS MODERATE 35: CPT | Performed by: INTERNAL MEDICINE

## 2019-08-10 PROCEDURE — 94640 AIRWAY INHALATION TREATMENT: CPT

## 2019-08-10 PROCEDURE — 80048 BASIC METABOLIC PNL TOTAL CA: CPT | Performed by: INTERNAL MEDICINE

## 2019-08-10 PROCEDURE — 85610 PROTHROMBIN TIME: CPT | Performed by: INTERNAL MEDICINE

## 2019-08-10 PROCEDURE — 84100 ASSAY OF PHOSPHORUS: CPT | Performed by: INTERNAL MEDICINE

## 2019-08-10 RX ORDER — INSULIN GLARGINE 100 [IU]/ML
14 INJECTION, SOLUTION SUBCUTANEOUS
Status: DISCONTINUED | OUTPATIENT
Start: 2019-08-10 | End: 2019-08-12

## 2019-08-10 RX ADMIN — FLUTICASONE FUROATE AND VILANTEROL TRIFENATATE 1 PUFF: 100; 25 POWDER RESPIRATORY (INHALATION) at 10:01

## 2019-08-10 RX ADMIN — PREDNISONE 40 MG: 20 TABLET ORAL at 08:48

## 2019-08-10 RX ADMIN — CALCIUM ACETATE 667 MG: 667 CAPSULE ORAL at 16:30

## 2019-08-10 RX ADMIN — INSULIN LISPRO 10 UNITS: 100 INJECTION, SOLUTION INTRAVENOUS; SUBCUTANEOUS at 17:06

## 2019-08-10 RX ADMIN — INSULIN LISPRO 4 UNITS: 100 INJECTION, SOLUTION INTRAVENOUS; SUBCUTANEOUS at 12:36

## 2019-08-10 RX ADMIN — TORSEMIDE 20 MG: 20 TABLET ORAL at 17:09

## 2019-08-10 RX ADMIN — METOPROLOL TARTRATE 25 MG: 25 TABLET, FILM COATED ORAL at 08:48

## 2019-08-10 RX ADMIN — CLOTRIMAZOLE: 10 CREAM TOPICAL at 08:47

## 2019-08-10 RX ADMIN — HEPARIN SODIUM AND DEXTROSE 21 UNITS/KG/HR: 10000; 5 INJECTION INTRAVENOUS at 01:15

## 2019-08-10 RX ADMIN — CLOTRIMAZOLE: 10 CREAM TOPICAL at 19:06

## 2019-08-10 RX ADMIN — WARFARIN SODIUM 7.5 MG: 7.5 TABLET ORAL at 17:09

## 2019-08-10 RX ADMIN — CALCIUM ACETATE 667 MG: 667 CAPSULE ORAL at 12:36

## 2019-08-10 RX ADMIN — CALCIUM ACETATE 667 MG: 667 CAPSULE ORAL at 08:47

## 2019-08-10 RX ADMIN — INSULIN GLARGINE 14 UNITS: 100 INJECTION, SOLUTION SUBCUTANEOUS at 21:12

## 2019-08-10 RX ADMIN — INSULIN LISPRO 4 UNITS: 100 INJECTION, SOLUTION INTRAVENOUS; SUBCUTANEOUS at 08:44

## 2019-08-10 RX ADMIN — ATORVASTATIN CALCIUM 20 MG: 20 TABLET, FILM COATED ORAL at 08:47

## 2019-08-10 RX ADMIN — INSULIN LISPRO 30 UNITS: 100 INJECTION, SOLUTION INTRAVENOUS; SUBCUTANEOUS at 17:07

## 2019-08-10 RX ADMIN — METOPROLOL TARTRATE 25 MG: 25 TABLET, FILM COATED ORAL at 21:11

## 2019-08-10 RX ADMIN — TORSEMIDE 20 MG: 20 TABLET ORAL at 08:49

## 2019-08-10 RX ADMIN — INSULIN LISPRO 30 UNITS: 100 INJECTION, SOLUTION INTRAVENOUS; SUBCUTANEOUS at 08:45

## 2019-08-10 RX ADMIN — INSULIN LISPRO 30 UNITS: 100 INJECTION, SOLUTION INTRAVENOUS; SUBCUTANEOUS at 12:36

## 2019-08-10 RX ADMIN — HYDROCODONE BITARTRATE AND ACETAMINOPHEN 2 TABLET: 5; 325 TABLET ORAL at 17:09

## 2019-08-10 NOTE — ASSESSMENT & PLAN NOTE
Improved  Continue on prednisone  Will discontinue after today's dose   Continue Benadryl p r n  Rash is improving  No need for skin biopsy at this point  Will continue to monitor

## 2019-08-10 NOTE — ASSESSMENT & PLAN NOTE
Lab Results   Component Value Date    HGBA1C 9 4 (H) 12/27/2018       Recent Labs     08/09/19  1112 08/09/19  1617 08/09/19  1742 08/09/19  2127   POCGLU 247* 335* 352* 369*     · 07/31/2019: Left AKA  · Patient has completed the course of IV Vanco/Cefepime as per ID  · Monitor WBC and fever curve  · Continue Novolog/Lantus/sliding scale coverage  · Podiatry, ID general surgery on board  · Continue with current pain regimen

## 2019-08-10 NOTE — ASSESSMENT & PLAN NOTE
· INR is therapeutic  Will discontinue heparin drip  · Continue on Coumadin and follow-up PT INR  · Continue Lopressor

## 2019-08-10 NOTE — ASSESSMENT & PLAN NOTE
· Patient had volume overload post surgery  Was diuresed with IV Lasix initially and switched to torsemide  · Daily weight and I&Os  · Appears euvolemic  · Continue on torsemide  · Continue beta-blockers/Aldactone/atorvastatin      Weight (last 2 days)     Date/Time   Weight    08/10/19 0533   (!) 149 (328 27)    08/09/19 0600   (!) 149 (328 27)    08/08/19 0600   (!) 152 (335 32)              Intake/Output Summary (Last 24 hours) at 8/10/2019 0752  Last data filed at 8/10/2019 0621  Gross per 24 hour   Intake 30 ml   Output 1750 ml   Net -1720 ml

## 2019-08-10 NOTE — PROGRESS NOTES
Pt Heparin drip running at 21units/kg/hr  Notified NICOLA Newell that order needs to be increased to accommodate rate  CRNP increased dose in order  NICOLA also ordered PTT to be rechecked now  PTT therapeutic at 75  Will monitor patient

## 2019-08-10 NOTE — PROGRESS NOTES
NEPHROLOGY PROGRESS NOTE   Fernanda Rahman 79 y o  male MRN: [id number]  Unit/Bed#: [address] [place] [address] Encounter: [id number]  Reason for Consult: DORA/CKD    ASSESSMENT AND PLAN:  61-year-old male With a past medical history of AFib, hypertension, CHF, PVD, COPD,who initially presents on 07/25 with left lower extremity wound and pain  Eventually had left lower extremity amputation  We are consulted for DORA/CKD management  DORA on CKD stage 3, baseline creatinine 1 2 to 1 3  -DORA could be secondary to prerenal/ATN +/- elevated vancomycin level (level 31 on 8/1/19) contributing, another concern remains paraproteinemia related renal involvement  -creatinine seems to have plateaued at 1 5 to 1 7 over last several days  Creatinine 1 5 today overall stable  -urinalysis bland without hematuria or proteinuria  Upc ratio 370 mg   -renal ultrasound shows no hydronephrosis, normal echogenicity  -BMP in a m , avoid nephrotoxins or NSAIDs   -bladder scan for PVR nonsignificant prior    Significant azotemia in the setting of stable renal function  -this could be secondary to use of steroids  -stool occult blood results to follow    Bi clonal gammopathy, 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  -FLC ratio 4 6  -close hematology follow-up    Diastolic CHF, prior echo shows EF 58%, grade 3 diastolic dysfunction  -currently remains on torsemide 20 mg b i d  -continue to monitor, not on any respiratory distress, remains on room air    Skin rash, seems to be significantly improving as per patient  Remains on p  O  Steroid as per primary team   -complements negative, Anca negative COLLEEN negative rheumatoid factor negative    Mild hyperphosphatemia, patient is restarted on Calcium Acetate    Discussed with primary team     SUBJECTIVE:  Patient seen and examined at bedside  No chest pain, shortness of breath, nausea, vomiting, abdominal pain or diarrhea  No urinary complaints       OBJECTIVE:  Current Weight: Weight - Scale: (!) 149 kg (328 lb 4 2 oz)  Vitals:    08/10/19 1531   BP: 124/60   Pulse: 79   Resp: 18   Temp: 98 8 °F (37 1 °C)   SpO2: 96%       Intake/Output Summary (Last 24 hours) at 8/10/2019 1556  Last data filed at 8/10/2019 1443  Gross per 24 hour   Intake --   Output 2650 ml   Net -2650 ml       Physical Examination:  General:  Lying in bed, no acute distress   Eyes:  Mild conjunctival pallor present  ENT:  External examination of ears and nose unremarkable  Neck:  Supple  Respiratory:  Bilateral air entry present  CVS:  S1, S2 present  GI:  Soft, nontender, nondistended  CNS:  Active alert oriented x3  Extremities:  Trace edema in leg, left leg amputation  Skin:  Skin rash improving    Medications:    Current Facility-Administered Medications:     acetaminophen (TYLENOL) tablet 650 mg, 650 mg, Oral, Q6H PRN, Colt Sánchez PA-C    acetaminophen (TYLENOL) tablet 650 mg, 650 mg, Oral, Q4H PRN, Alexsander Tejada MD    albuterol (PROVENTIL HFA,VENTOLIN HFA) inhaler 2 puff, 2 puff, Inhalation, Q6H PRN, LESLEY Cortés-SHANEKA, 2 puff at 07/31/19 0749    atorvastatin (LIPITOR) tablet 20 mg, 20 mg, Oral, Daily, LESLEY Cortés-C, 20 mg at 08/10/19 0847    calcium acetate (PHOSLO) capsule 667 mg, 667 mg, Oral, TID With Meals, Chata Curiel MD, 667 mg at 08/10/19 1236    clotrimazole (LOTRIMIN) 1 % cream, , Topical, BID, Chata Curiel MD    diphenhydrAMINE (BENADRYL) tablet 25 mg, 25 mg, Oral, Q8H PRN, Chata Curiel MD, 25 mg at 08/09/19 2225    fentanyl citrate (PF) 100 MCG/2ML, , Intravenous, Code/Trauma/Sedation Med, Alexsander Tejada MD, 25 mcg at 08/09/19 1444    fluticasone-vilanterol (BREO ELLIPTA) 100-25 mcg/inh inhaler 1 puff, 1 puff, Inhalation, Daily, Suzette Sánchez PA-C, 1 puff at 08/10/19 1001    hydrALAZINE (APRESOLINE) injection 10 mg, 10 mg, Intravenous, Q6H PRN, Chata Curiel MD    HYDROcodone-acetaminophen (NORCO) 5-325 mg per tablet 2 tablet, 2 tablet, Oral, Q4H PRN, Rose Ahn JOEY Mora, 2 tablet at 08/09/19 2225    HYDROmorphone (DILAUDID) injection 0 5 mg, 0 5 mg, Intravenous, Q2H PRN, Dalila Mora PA-C, 0 5 mg at 08/09/19 0813    insulin glargine (LANTUS) subcutaneous injection 7 Units 0 07 mL, 7 Units, Subcutaneous, HS, Zoe Jean Baptiste MD, 7 Units at 08/09/19 2220    insulin lispro (HumaLOG) 100 units/mL subcutaneous injection 2-12 Units, 2-12 Units, Subcutaneous, TID AC, 4 Units at 08/10/19 1236 **AND** Fingerstick Glucose (POCT), , , TID AC, Suzette Sánchez PA-C    insulin lispro (HumaLOG) 100 units/mL subcutaneous injection 30 Units, 30 Units, Subcutaneous, TID With Meals, Oseas Sánchez PA-C, 30 Units at 08/10/19 1236    metoprolol tartrate (LOPRESSOR) tablet 25 mg, 25 mg, Oral, Q12H [address], Suzette Sánchez PA-C, 25 mg at 08/10/19 0848    midazolam (VERSED) injection, , , Code/Trauma/Sedation Med, Wendie Serna MD, 0 5 mg at 08/09/19 1444    predniSONE tablet 40 mg, 40 mg, Oral, Daily, Zoe Jean Baptiste MD, 40 mg at 08/10/19 0848    torsemide (DEMADEX) tablet 20 mg, 20 mg, Oral, BID (diuretic), Zoe Jean Baptiste MD, 20 mg at 08/10/19 0849    warfarin (COUMADIN) tablet 7 5 mg, 7 5 mg, Oral, Daily (warfarin), Zoe Jean Baptiste MD, 7 5 mg at 08/09/19 1755    Laboratory Results:  Results from last 7 days   Lab Units 08/10/19  0508 08/09/19  0537 08/09/19  0536 08/08/19  0517 08/07/19  0601 08/06/19  0448 08/05/19  0541 08/04/19  0546   WBC Thousand/uL 8 81 7 51  --   --  8 81  --  6 51 7 71   HEMOGLOBIN g/dL 9 7* 9 2*  --   --  8 8*  --  8 4* 8 3*   HEMATOCRIT % 32 6* 31 2*  --   --  29 8*  --  28 8* 27 9*   PLATELETS Thousands/uL 410* 413*  --   --  368  --  332 325   SODIUM mmol/L 138  --  140 139 139 139 139 139   POTASSIUM mmol/L 4 4  --  4 4 4 5 4 6 4 7 4 9 5 0   CHLORIDE mmol/L 101  --  102 100 101 102 104 103   CO2 mmol/L 24  --  25 27 26 28 24 26   BUN mg/dL 92*  --  94* 81* 78* 74* 70* 65*   CREATININE mg/dL 1 54*  --  1 68* 1 71* 1 54* 1 74* 1 76* 1 86*   CALCIUM mg/dL 9 3  --  9 1 8 8 9 3 8 4 8 6 8 4   MAGNESIUM mg/dL  --   --  1 7  --   --   --  2 2 2 0   PHOSPHORUS mg/dL 5 0*  --  5 3* 5 2* 5 8* 4 6* 5 4* 4 8*       CT bone marrow biopsy and aspiration   Final Result by Doris Sousa MD (08/09 1806)   Impression: Successful percutaneous bone marrow aspiration and bone core biopsy of right ileum, yielding a specimen adequate for evaluation   A full pathology report will follow  Workstation performed: [id number]         XR bone survey complete >12 mos   Final Result by Jaswinder Coy MD (08/08 1653)      Patient status post above knee amputation of the left lower extremity  No lytic or blastic bone lesions are appreciated  Right PICC line noted            Workstation performed: [id number]         XR chest portable   Final Result by Estefani Garcia MD (08/04 1902)   1  Stable enlargement of the cardiac silhouette and pulmonary vascular congestion  2   Bibasilar linear opacities compatible with atelectasis or scarring  Workstation performed: [id number]         RADIOLOGY RESULTS   Final Result by  (08/02 0950)      US kidney and bladder   Final Result by Julianne Gregory MD (07/30 1637)      No evidence of nephrolithiasis or hydronephrosis  Stable left renal cyst measuring 1 3 cm  Unremarkable bladder  Workstation performed: [id number]         MRI foot/forefoot toes left w wo contrast   Final Result by Yuly Zaldivar MD (07/29 2010)      XR chest PICC line portable   Final Result by Gamaliel Smalls MD (07/29 1506)      Satisfactory right-sided PICC line placement  Cardiomegaly with mild vascular congestion/volume overload  Workstation performed: [id number]         VAS lower limb arterial duplex, complete bilateral   Final Result by Carin Whitaker MD (07/29 1451)      XR foot 3+ views LEFT   Final Result by Callie Holt DO (07/25 1416)   Limited exam due to patient's condition  Worsening Charcot joint at midfoot  Worsening soft tissue swelling  Shallow ulceration plantar aspect of the midfoot  Could not radiographically confirm an abscess or osteomyelitis  Recommend MRI or Ceretec bone scan if osteomyelitis is clinically suspected  The study was marked in [facility] for immediate notification  Workstation performed: [id number]             Portions of the record may have been created with voice recognition software  Occasional wrong word or "sound a like" substitutions may have occurred due to the inherent limitations of voice recognition software   Read the chart carefully and recognize, using context, where substitutions have occurred ]

## 2019-08-10 NOTE — PROGRESS NOTES
Progress Note - Eleanor Soni 1950, 79 y o  male MRN: [id number]    Unit/Bed#: [address] [place] [address] Encounter: [id number]    Primary Care Provider: Ziyad Ruiz MD   Date and time admitted to hospital: 7/25/2019  1:04 PM        Biclonal gammopathy  Assessment & Plan  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  Multiple myeloma workup is pending  Hematology consult appreciated  Patient underwent bone marrow biopsy  Skeletal survey did not reveal any lytic or blastic lesions  Outpatient follow-up with Hematology  Patient is medically stable for discharge  Will await placement to skilled nursing rehab by  which will likely happen on Monday      Rash  Assessment & Plan  Improved  Continue on prednisone  Will discontinue after today's dose   Continue Benadryl p r n  Rash is improving  No need for skin biopsy at this point  Will continue to monitor  Acute renal failure superimposed on stage 3 chronic kidney disease (HCC)  Assessment & Plan  · Creatinine 1 54 today from baseline of 1 2-1 4  · Nephrology follow-up  · Ultrasound renals = No evidence of nephrolithiasis or hydronephrosis  Stable left renal cyst measuring 1 3 cm  Unremarkable bladder  · Bladder scan and PVRs  · Avoid hypotension/NSAIDs    Moderate persistent asthma without complication  Assessment & Plan  · cont albuterol/Advair    Acute on chronic diastolic congestive heart failure ([address] )  Assessment & Plan  · Patient had volume overload post surgery  Was diuresed with IV Lasix initially and switched to torsemide  · Daily weight and I&Os  · Appears euvolemic  · Continue on torsemide  · Continue beta-blockers/Aldactone/atorvastatin      Weight (last 2 days)     Date/Time   Weight    08/10/19 0533   (!) 149 (328 27)    08/09/19 0600   (!) 149 (328 27)    08/08/19 0600   (!) 152 (335 32)              Intake/Output Summary (Last 24 hours) at 8/10/2019 0752  Last data filed at 8/10/2019 0621  Gross per 24 hour   Intake 30 ml Output 1750 ml   Net -1720 ml       Morbid obesity (HCC)  Assessment & Plan  · Encourage weight loss    Chronic atrial fibrillation (HCC)  Assessment & Plan  · INR is therapeutic  Will discontinue heparin drip  · Continue on Coumadin and follow-up PT INR  · Continue Lopressor  Essential hypertension  Assessment & Plan  · continue torsemide/ beta-blocker/Aldactone  · Cozaar on hold    Chronic venous stasis dermatitis of both lower extremities  Assessment & Plan  · Chronic, POA  · S/P left AKA  · podiatry/wound care  * Type 2 diabetes mellitus with left diabetic foot ulcer [facility])  Assessment & Plan  Lab Results   Component Value Date    HGBA1C 9 4 (H) 12/27/2018       Recent Labs     08/09/19  1112 08/09/19  1617 08/09/19  1742 08/09/19  2127   POCGLU 247* 335* 352* 369*     · 07/31/2019: Left AKA  · Patient has completed the course of IV Vanco/Cefepime as per ID  · Monitor WBC and fever curve  · Continue Novolog/Lantus/sliding scale coverage  · Podiatry, ID general surgery on board  · Continue with current pain regimen  Labs & Imaging: I have personally reviewed pertinent reports  VTE Pharmacologic Prophylaxis: Warfarin (Coumadin)  VTE Mechanical Prophylaxis: sequential compression device    Code Status:   Level 1 - Full Code    Patient Centered Rounds: I have performed bedside rounds with nursing staff today  Discussions with Specialists or Other Care Team Provider:     Education and Discussions with Family / Patient: Family    Current Length of Stay: 12 day(s)    Current Patient Status: Inpatient   Certification Statement: The patient will continue to require additional inpatient hospital stay due to see my assessment and plan  Subjective:   Patient is seen and examined at bedside  Denies any new complaints  Afebrile  INR is therapeutic  Will discontinue heparin drip  All other ROS are negative      Objective:    Vitals: Blood pressure 164/74, pulse 72, temperature 97 9 °F (36 6 °C), temperature source Oral, resp  rate 18, height 6' 3" (1 905 m), weight (!) 149 kg (328 lb 4 2 oz), SpO2 95 %  ,Body mass index is 41 03 kg/m²  SPO2 RA Rest      ED to Hosp-Admission (Current) from 7/25/2019 in [facility]   SpO2  95 %   SpO2 Activity  At Rest   O2 Device  None (Room air)   O2 Flow Rate  2 L/min        I&O:     Intake/Output Summary (Last 24 hours) at 8/10/2019 0755  Last data filed at 8/10/2019 5812  Gross per 24 hour   Intake 30 ml   Output 1750 ml   Net -1720 ml       Physical Exam:    General- Alert, lying comfortably in bed  Not in any acute distress  HEENT- JENNIE, EOM intact  Neck- Supple, No JVD  CVS- regular, S1 and S2 normal   Chest- Bilateral Air entry, No rhochi, crackles or wheezing present  Abdomen- soft, nontender, not distended, no guarding or rigidity, BS+  Extremities-  left AKA-stump site looks clean  Skin rashes improving  CNS-   Alert, awake and orientedx3  No focal deficits present      Invasive Devices     Peripherally Inserted Central Catheter Line            PICC Line 92/63/59 Right Basilic 11 days                      Social History  reviewed  Family History   Problem Relation Age of Onset    Other Mother         CARDIAC DISORDER     Diabetes Mother     Cancer Father     Other Family         CARDIAC DISORDER     Diabetes Family     Cancer Family     Mental illness Family     reviewed    Meds:  Current Facility-Administered Medications   Medication Dose Route Frequency Provider Last Rate Last Dose    acetaminophen (TYLENOL) tablet 650 mg  650 mg Oral Q6H PRN Suzette Sánchez PA-C        acetaminophen (TYLENOL) tablet 650 mg  650 mg Oral Q4H PRN Vito Meyer MD        albuterol (PROVENTIL HFA,VENTOLIN HFA) inhaler 2 puff  2 puff Inhalation Q6H PRN Suzette Sánchez PA-C   2 puff at 07/31/19 0749    atorvastatin (LIPITOR) tablet 20 mg  20 mg Oral Daily Suzette Sánchez PA-C   20 mg at 08/09/19 0937    calcium acetate (PHOSLO) capsule 667 mg  667 mg Oral TID With Meals Shawn Zayas MD   667 mg at 08/09/19 1755    clotrimazole (LOTRIMIN) 1 % cream   Topical BID Shawn Zayas MD        diphenhydrAMINE (BENADRYL) tablet 25 mg  25 mg Oral Q8H PRN Shawn Zayas MD   25 mg at 08/09/19 2225    fentanyl citrate (PF) 100 MCG/2ML   Intravenous Code/Trauma/Sedation Med Ronell Klinefelter, MD   25 mcg at 08/09/19 1444    fluticasone-vilanterol (BREO ELLIPTA) 100-25 mcg/inh inhaler 1 puff  1 puff Inhalation Daily Suzette Sánchez PA-C   1 puff at 08/09/19 0941    hydrALAZINE (APRESOLINE) injection 10 mg  10 mg Intravenous Q6H PRN Shawn Zayas MD        HYDROcodone-acetaminophen (NORCO) 5-325 mg per tablet 2 tablet  2 tablet Oral Q4H PRN Ashley Nunez PA-C   2 tablet at 08/09/19 2225    HYDROmorphone (DILAUDID) injection 0 5 mg  0 5 mg Intravenous Q2H PRN Dalila Mora PA-C   0 5 mg at 08/09/19 0813    insulin glargine (LANTUS) subcutaneous injection 7 Units 0 07 mL  7 Units Subcutaneous HS Shawn Zayas MD   7 Units at 08/09/19 2220    insulin lispro (HumaLOG) 100 units/mL subcutaneous injection 2-12 Units  2-12 Units Subcutaneous TID AC Suzette Sánchez PA-C   10 Units at 08/09/19 1754    insulin lispro (HumaLOG) 100 units/mL subcutaneous injection 30 Units  30 Units Subcutaneous TID With Meals Suzette Sánchez PA-C   30 Units at 08/09/19 1754    metoprolol tartrate (LOPRESSOR) tablet 25 mg  25 mg Oral Q12H [address] Suzette Sánchez PA-C   25 mg at 08/09/19 2221    midazolam (VERSED) injection    Code/Trauma/Sedation Med Ronell Klinefelter, MD   0 5 mg at 08/09/19 1444    predniSONE tablet 40 mg  40 mg Oral Daily Shawn Zayas MD   40 mg at 08/09/19 2215    torsemide (DEMADEX) tablet 20 mg  20 mg Oral BID (diuretic) Shawn Zayas MD   20 mg at 08/09/19 1756    warfarin (COUMADIN) tablet 7 5 mg  7 5 mg Oral Daily (warfarin) Shawn Zayas MD   7 5 mg at 08/09/19 1755      Medications Prior to Admission   Medication    albuterol (PROVENTIL HFA,VENTOLIN HFA) 90 mcg/act inhaler    allopurinol (ZYLOPRIM) 300 mg tablet    atorvastatin (LIPITOR) 20 mg tablet    BD INSULIN SYRINGE U/F 31G X 5/16" 0 5 ML MISC    BD PEN NEEDLE HILARIO U/F 32G X 4 MM MISC    fluticasone-salmeterol (ADVAIR DISKUS, WIXELA INHUB) 250-50 mcg/dose inhaler    glucose blood (ONE TOUCH ULTRA TEST) test strip    insulin aspart (NOVOLOG FLEXPEN) 100 Units/mL injection pen    Insulin Pen Needle 31G X 5 MM MISC    losartan (COZAAR) 50 mg tablet    metFORMIN (GLUCOPHAGE) 1000 MG tablet    metoprolol tartrate (LOPRESSOR) 25 mg tablet    torsemide (DEMADEX) 20 mg tablet    warfarin (COUMADIN) 5 mg tablet       Labs:  Results from last 7 days   Lab Units 08/10/19  0508 08/09/19  0537 08/07/19  0601   WBC Thousand/uL 8 81 7 51 8 81   HEMOGLOBIN g/dL 9 7* 9 2* 8 8*   HEMATOCRIT % 32 6* 31 2* 29 8*   PLATELETS Thousands/uL 410* 413* 368   NEUTROS PCT % 73 72 75   LYMPHS PCT % 19 19 15   MONOS PCT % 6 6 6   EOS PCT % 0 0 1     Results from last 7 days   Lab Units 08/10/19  0508 08/09/19  0536 08/08/19  0517   POTASSIUM mmol/L 4 4 4 4 4 5   CHLORIDE mmol/L 101 102 100   CO2 mmol/L 24 25 27   BUN mg/dL 92* 94* 81*   CREATININE mg/dL 1 54* 1 68* 1 71*   CALCIUM mg/dL 9 3 9 1 8 8   ALK PHOS U/L  --   --  150*   ALT U/L  --   --  19   AST U/L  --   --  21     Lab Results   Component Value Date    TROPONINI <0 02 03/23/2018    TROPONINI <0 02 10/08/2017    CKTOTAL 53 03/23/2018    CKTOTAL 55 10/08/2017     Results from last 7 days   Lab Units 08/10/19  0508 08/09/19  0536 08/08/19  0518   INR  2 13* 1 81* 1 58*     Lab Results   Component Value Date    BLOODCX No Growth After 5 Days  07/25/2019    BLOODCX No Growth After 5 Days  07/25/2019    BLOODCX No Growth After 5 Days   12/25/2018    WOUNDCULT 4+ Growth of Proteus mirabilis (A) 07/25/2019    WOUNDCULT 4+ Growth of  07/25/2019    WOUNDCULT (A) 07/25/2019     4+ Growth of Methicillin Resistant Staphylococcus aureus    WOUNDCULT 1+ Growth of Proteus mirabilis (A) 07/25/2019    WOUNDCULT 4+ Growth of  07/25/2019         Imaging:  Results for orders placed during the hospital encounter of 07/25/19   XR chest portable    Narrative CHEST     INDICATION:   chf     COMPARISON:  Chest radiograph 7/29/2019    EXAM PERFORMED/VIEWS:  XR CHEST PORTABLE      FINDINGS:  Right-sided PICC line with tip in the region of the cavoatrial junction  Heart shadow is enlarged but unchanged from prior exam     There is pulmonary vascular congestion  Streaky bibasilar opacities  No large effusion or pneumothorax  Osseous structures appear within normal limits for patient age  Impression 1  Stable enlargement of the cardiac silhouette and pulmonary vascular congestion  2   Bibasilar linear opacities compatible with atelectasis or scarring  Workstation performed: [id number]       Results for orders placed in visit on 05/02/19   XR chest pa & lateral    Narrative CHEST     INDICATION:   J45 40: Moderate persistent asthma, uncomplicated  S37 22: Dyspnea, unspecified  COMPARISON:  One view chest 3/23/2018    EXAM PERFORMED/VIEWS:  XR CHEST PA & LATERAL      FINDINGS:    Cardiac silhouette is enlarged  Aortic calcification is present  Minimal bibasilar subsegmental atelectasis left greater than right  No pneumothorax, or pleural effusion  Mild distention of central pulmonary vessels  Multilevel thoracolumbar spondylosis  Impression Mild central pulmonary vascular congestion  Minimal bibasilar subsegmental atelectasis left greater than right          Workstation performed: [id number]         Last 24 Hours Medication List:     Current Facility-Administered Medications:  acetaminophen 650 mg Oral Q6H PRN Herlene Popper JOEY Sánchez   acetaminophen 650 mg Oral Q4H PRN Winsome Jacobson MD   albuterol 2 puff Inhalation Q6H PRN Herlene Popper JOEY Sánchez   atorvastatin 20 mg Oral Daily Herlene Popper JOEY Sánchez   calcium acetate 667 mg Oral TID With Meals Adeline Boggs MD   clotrimazole  Topical BID Adeline Boggs MD   diphenhydrAMINE 25 mg Oral Q8H PRN Adeline Boggs MD   fentanyl citrate (PF)  Intravenous Code/Trauma/Sedation Philip Campos MD   fluticasone-vilanterol 1 puff Inhalation Daily Dee Sánchez PA-C   hydrALAZINE 10 mg Intravenous Q6H PRN Adeline Boggs MD   HYDROcodone-acetaminophen 2 tablet Oral Q4H PRN Dalila Mora PA-C   HYDROmorphone 0 5 mg Intravenous Q2H PRN Dalila Mora PA-C   insulin glargine 7 Units Subcutaneous HS Adeline Boggs MD   insulin lispro 2-12 Units Subcutaneous TID AC Suzette Sánchez PA-C   insulin lispro 30 Units Subcutaneous TID With Meals Dee Sánchez PA-C   metoprolol tartrate 25 mg Oral Q12H [facility] Suzette Sánchez PA-C   midazolam   Code/Trauma/Sedation Philip Campos MD   predniSONE 40 mg Oral Daily Adeline Boggs MD   torsemide 20 mg Oral BID (diuretic) Adeline Boggs MD   warfarin 7 5 mg Oral Daily (warfarin) Adeline Boggs MD        Today, Patient Was Seen By: Adeline Boggs MD    ** Please Note: Dictation voice to text software may have been used in the creation of this document   **

## 2019-08-10 NOTE — ASSESSMENT & PLAN NOTE
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  Multiple myeloma workup is pending  Hematology consult appreciated  Patient underwent bone marrow biopsy  Skeletal survey did not reveal any lytic or blastic lesions  Outpatient follow-up with Hematology  Patient is medically stable for discharge    Will await placement to skilled nursing rehab by  which will likely happen on Monday

## 2019-08-10 NOTE — PROGRESS NOTES
AM PTT checked per CRNP and therapeutic at 88   Per Heparin order/protocol will recheck PTT tomorrow am

## 2019-08-10 NOTE — PLAN OF CARE
Problem: Potential for Falls  Goal: Patient will remain free of falls  Description  INTERVENTIONS:  - Assess patient frequently for physical needs  -  Identify cognitive and physical deficits and behaviors that affect risk of falls    -  [place] fall precautions as indicated by assessment   - Educate patient/family on patient safety including physical limitations  - Instruct patient to call for assistance with activity based on assessment  - Modify environment to reduce risk of injury  - Consider OT/PT consult to assist with strengthening/mobility  Outcome: Progressing     Problem: Prexisting or High Potential for Compromised Skin Integrity  Goal: Skin integrity is maintained or improved  Description  INTERVENTIONS:  - Identify patients at risk for skin breakdown  - Assess and monitor skin integrity  - Assess and monitor nutrition and hydration status  - Monitor labs (i e  albumin)  - Assess for incontinence   - Turn and reposition patient  - Assist with mobility/ambulation  - Relieve pressure over bony prominences  - Avoid friction and shearing  - Provide appropriate hygiene as needed including keeping skin clean and dry  - Evaluate need for skin moisturizer/barrier cream  - Collaborate with interdisciplinary team (i e  Nutrition, Rehabilitation, etc )   - Patient/family teaching  Outcome: Progressing     Problem: SKIN/TISSUE INTEGRITY - ADULT  Goal: Skin integrity remains intact  Description  INTERVENTIONS  - Identify patients at risk for skin breakdown  - Assess and monitor skin integrity  - Assess and monitor nutrition and hydration status  - Monitor labs (i e  albumin)  - Assess for incontinence   - Turn and reposition patient  - Assist with mobility/ambulation  - Relieve pressure over bony prominences  - Avoid friction and shearing  - Provide appropriate hygiene as needed including keeping skin clean and dry  - Evaluate need for skin moisturizer/barrier cream  - Collaborate with interdisciplinary team (i e  Nutrition, Rehabilitation, etc )   - Patient/family teaching  Outcome: Progressing  Goal: Incision(s), wounds(s) or drain site(s) healing without S/S of infection  Description  INTERVENTIONS  - Assess and document risk factors for skin impairment   - Assess and document dressing, incision, wound bed, drain sites and surrounding tissue  - Initiate Nutrition services consult and/or wound management as needed  Outcome: Progressing     Problem: MUSCULOSKELETAL - ADULT  Goal: Maintain or return mobility to safest level of function  Description  INTERVENTIONS:  - Assess patient's ability to carry out ADLs; assess patient's baseline for ADL function and identify physical deficits which impact ability to perform ADLs (bathing, care of mouth/teeth, toileting, grooming, dressing, etc )  - Assess/evaluate cause of self-care deficits   - Assess range of motion  - Assess patient's mobility; develop plan if impaired  - Assess patient's need for assistive devices and provide as appropriate  - Encourage maximum independence but intervene and supervise when necessary  - Involve family in performance of ADLs  - Assess for home care needs following discharge   - Request OT consult to assist with ADL evaluation and planning for discharge  - Provide patient education as appropriate  Outcome: Progressing     Problem: PAIN - ADULT  Goal: Verbalizes/displays adequate comfort level or baseline comfort level  Description  Interventions:  - Encourage patient to monitor pain and request assistance  - Assess pain using appropriate pain scale  - Administer analgesics based on type and severity of pain and evaluate response  - Implement non-pharmacological measures as appropriate and evaluate response  - Consider cultural and social influences on pain and pain management  - Notify physician/advanced practitioner if interventions unsuccessful or patient reports new pain  Outcome: Progressing     Problem: INFECTION - ADULT  Goal: Absence or prevention of progression during hospitalization  Description  INTERVENTIONS:  - Assess and monitor for signs and symptoms of infection  - Monitor lab/diagnostic results  - Monitor all insertion sites, i e  indwelling lines, tubes, and drains  - Monitor endotracheal (as able) and nasal secretions for changes in amount and color  - [place] appropriate cooling/warming therapies per order  - Administer medications as ordered  - Instruct and encourage patient and family to use good hand hygiene technique  - Identify and instruct in appropriate isolation precautions for identified infection/condition  Outcome: Progressing     Problem: SAFETY ADULT  Goal: Patient will remain free of falls  Description  INTERVENTIONS:  - Assess patient frequently for physical needs  -  Identify cognitive and physical deficits and behaviors that affect risk of falls    -  [place] fall precautions as indicated by assessment   - Educate patient/family on patient safety including physical limitations  - Instruct patient to call for assistance with activity based on assessment  - Modify environment to reduce risk of injury  - Consider OT/PT consult to assist with strengthening/mobility  Outcome: Progressing     Problem: DISCHARGE PLANNING  Goal: Discharge to home or other facility with appropriate resources  Description  INTERVENTIONS:  - Identify barriers to discharge w/patient and caregiver  - Arrange for needed discharge resources and transportation as appropriate  - Identify discharge learning needs (meds, wound care, etc )  - Arrange for interpretive services to assist at discharge as needed  - Refer to Case Management Department for coordinating discharge planning if the patient needs post-hospital services based on physician/advanced practitioner order or complex needs related to functional status, cognitive ability, or social support system  Outcome: Progressing     Problem: Knowledge Deficit  Goal: Patient/family/caregiver demonstrates understanding of disease process, treatment plan, medications, and discharge instructions  Description  Complete learning assessment and assess knowledge base  Interventions:  - Provide teaching at level of understanding  - Provide teaching via preferred learning methods  Outcome: Progressing     Problem: Nutrition/Hydration-ADULT  Goal: Nutrient/Hydration intake appropriate for improving, restoring or maintaining nutritional needs  Description  Monitor and assess patient's nutrition/hydration status for malnutrition (ex- brittle hair, bruises, dry skin, pale skin and conjunctiva, muscle wasting, smooth red tongue, and disorientation)  Collaborate with interdisciplinary team and initiate plan and interventions as ordered  Monitor patient's weight and dietary intake as ordered or per policy  Utilize nutrition screening tool and intervene per policy  Determine patient's food preferences and provide high-protein, high-caloric foods as appropriate       INTERVENTIONS:  - Monitor oral intake, urinary output, labs, and treatment plans  - Assess nutrition and hydration status and recommend course of action  - Evaluate amount of meals eaten  - Assist patient with eating if necessary   - Allow adequate time for meals  - Recommend/ encourage appropriate diets, oral nutritional supplements, and vitamin/mineral supplements  - Order, calculate, and assess calorie counts as needed  - Recommend, monitor, and adjust tube feedings and TPN/PPN based on assessed needs  - Assess need for intravenous fluids  - Provide specific nutrition/hydration education as appropriate  - Include patient/family/caregiver in decisions related to nutrition  Outcome: Progressing

## 2019-08-11 LAB
ANION GAP SERPL CALCULATED.3IONS-SCNC: 10 MMOL/L (ref 4–13)
APTT PPP: 34 SECONDS (ref 23–37)
BUN SERPL-MCNC: 105 MG/DL (ref 5–25)
CALCIUM SERPL-MCNC: 9.3 MG/DL (ref 8.3–10.1)
CHLORIDE SERPL-SCNC: 101 MMOL/L (ref 100–108)
CO2 SERPL-SCNC: 29 MMOL/L (ref 21–32)
CREAT SERPL-MCNC: 1.72 MG/DL (ref 0.6–1.3)
GFR SERPL CREATININE-BSD FRML MDRD: 41 ML/MIN/1.73SQ M
GLUCOSE SERPL-MCNC: 21 MG/DL (ref 65–140)
GLUCOSE SERPL-MCNC: 282 MG/DL (ref 65–140)
GLUCOSE SERPL-MCNC: 285 MG/DL (ref 65–140)
GLUCOSE SERPL-MCNC: 287 MG/DL (ref 65–140)
GLUCOSE SERPL-MCNC: 321 MG/DL (ref 65–140)
GLUCOSE SERPL-MCNC: 328 MG/DL (ref 65–140)
INR PPP: 2.28 (ref 0.84–1.19)
POTASSIUM SERPL-SCNC: 4.5 MMOL/L (ref 3.5–5.3)
PROTHROMBIN TIME: 24.8 SECONDS (ref 11.6–14.5)
SODIUM SERPL-SCNC: 140 MMOL/L (ref 136–145)

## 2019-08-11 PROCEDURE — 85730 THROMBOPLASTIN TIME PARTIAL: CPT | Performed by: NURSE PRACTITIONER

## 2019-08-11 PROCEDURE — 94760 N-INVAS EAR/PLS OXIMETRY 1: CPT

## 2019-08-11 PROCEDURE — 99232 SBSQ HOSP IP/OBS MODERATE 35: CPT | Performed by: INTERNAL MEDICINE

## 2019-08-11 PROCEDURE — 85610 PROTHROMBIN TIME: CPT | Performed by: INTERNAL MEDICINE

## 2019-08-11 PROCEDURE — 94640 AIRWAY INHALATION TREATMENT: CPT

## 2019-08-11 PROCEDURE — 80048 BASIC METABOLIC PNL TOTAL CA: CPT | Performed by: INTERNAL MEDICINE

## 2019-08-11 PROCEDURE — 82948 REAGENT STRIP/BLOOD GLUCOSE: CPT

## 2019-08-11 RX ORDER — TORSEMIDE 20 MG/1
20 TABLET ORAL DAILY
Status: DISCONTINUED | OUTPATIENT
Start: 2019-08-12 | End: 2019-08-12 | Stop reason: HOSPADM

## 2019-08-11 RX ADMIN — CALCIUM ACETATE 667 MG: 667 CAPSULE ORAL at 17:18

## 2019-08-11 RX ADMIN — HYDROCODONE BITARTRATE AND ACETAMINOPHEN 2 TABLET: 5; 325 TABLET ORAL at 17:18

## 2019-08-11 RX ADMIN — INSULIN GLARGINE 14 UNITS: 100 INJECTION, SOLUTION SUBCUTANEOUS at 21:18

## 2019-08-11 RX ADMIN — METOPROLOL TARTRATE 25 MG: 25 TABLET, FILM COATED ORAL at 08:24

## 2019-08-11 RX ADMIN — WARFARIN SODIUM 7.5 MG: 7.5 TABLET ORAL at 17:18

## 2019-08-11 RX ADMIN — INSULIN LISPRO 6 UNITS: 100 INJECTION, SOLUTION INTRAVENOUS; SUBCUTANEOUS at 11:59

## 2019-08-11 RX ADMIN — CALCIUM ACETATE 667 MG: 667 CAPSULE ORAL at 11:58

## 2019-08-11 RX ADMIN — INSULIN LISPRO 30 UNITS: 100 INJECTION, SOLUTION INTRAVENOUS; SUBCUTANEOUS at 17:25

## 2019-08-11 RX ADMIN — INSULIN LISPRO 6 UNITS: 100 INJECTION, SOLUTION INTRAVENOUS; SUBCUTANEOUS at 17:25

## 2019-08-11 RX ADMIN — INSULIN LISPRO 8 UNITS: 100 INJECTION, SOLUTION INTRAVENOUS; SUBCUTANEOUS at 08:15

## 2019-08-11 RX ADMIN — TORSEMIDE 20 MG: 20 TABLET ORAL at 08:24

## 2019-08-11 RX ADMIN — HYDROCODONE BITARTRATE AND ACETAMINOPHEN 2 TABLET: 5; 325 TABLET ORAL at 21:18

## 2019-08-11 RX ADMIN — PREDNISONE 40 MG: 20 TABLET ORAL at 08:19

## 2019-08-11 RX ADMIN — INSULIN LISPRO 30 UNITS: 100 INJECTION, SOLUTION INTRAVENOUS; SUBCUTANEOUS at 08:16

## 2019-08-11 RX ADMIN — FLUTICASONE FUROATE AND VILANTEROL TRIFENATATE 1 PUFF: 100; 25 POWDER RESPIRATORY (INHALATION) at 07:41

## 2019-08-11 RX ADMIN — CLOTRIMAZOLE: 10 CREAM TOPICAL at 08:18

## 2019-08-11 RX ADMIN — INSULIN LISPRO 30 UNITS: 100 INJECTION, SOLUTION INTRAVENOUS; SUBCUTANEOUS at 12:00

## 2019-08-11 RX ADMIN — ATORVASTATIN CALCIUM 20 MG: 20 TABLET, FILM COATED ORAL at 08:17

## 2019-08-11 RX ADMIN — CLOTRIMAZOLE: 10 CREAM TOPICAL at 17:21

## 2019-08-11 RX ADMIN — CALCIUM ACETATE 667 MG: 667 CAPSULE ORAL at 08:17

## 2019-08-11 RX ADMIN — METOPROLOL TARTRATE 25 MG: 25 TABLET, FILM COATED ORAL at 21:18

## 2019-08-11 NOTE — ASSESSMENT & PLAN NOTE
· Creatinine 1 72 today from baseline of 1 2-1 4  · Nephrology follow-up noted  · Ultrasound renals = No evidence of nephrolithiasis or hydronephrosis  Stable left renal cyst measuring 1 3 cm  Unremarkable bladder    · Bladder scan and PVRs  · Avoid hypotension/NSAIDs

## 2019-08-11 NOTE — ASSESSMENT & PLAN NOTE
Lab Results   Component Value Date    HGBA1C 9 4 (H) 12/27/2018       Recent Labs     08/10/19  2059 08/11/19  0809 08/11/19  1109 08/11/19  1112   POCGLU 179* 321* 21* 285*     · 07/31/2019: Left AKA  · Patient has completed the course of IV Vanco/Cefepime as per ID  · Monitor WBC and fever curve  · Continue Novolog/Lantus/sliding scale coverage  · Podiatry, ID general surgery on board  · Continue with current pain regimen    · Stable from surgical standpoint for discharge

## 2019-08-11 NOTE — ASSESSMENT & PLAN NOTE
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  Multiple myeloma workup is pending  Hematology consult appreciated  Patient underwent bone marrow biopsy on August 9  Follow-up biopsy results  Skeletal survey did not reveal any lytic or blastic lesions  Outpatient follow-up with Hematology  Patient is medically stable for discharge  Awaiting placement to skilled nursing rehab in [facility]

## 2019-08-11 NOTE — ASSESSMENT & PLAN NOTE
· INR is therapeutic  Heparin drip was discontinued  · Continue on Coumadin and follow-up PT INR  · Continue Lopressor for rate control

## 2019-08-11 NOTE — PROGRESS NOTES
NEPHROLOGY PROGRESS NOTE   Hang Tam 79 y o  male MRN: [id number]  Unit/Bed#: [address] [place] [address] Encounter: [id number]  Reason for Consult: DORA/CKD    ASSESSMENT AND PLAN:  28-year-old male With a past medical history of AFib, hypertension, CHF, PVD, COPD,who initially presents on 07/25 with left lower extremity wound and pain  Eventually had left lower extremity amputation  We are consulted for DORA/CKD management      DORA on CKD stage 3, baseline creatinine 1 2 to 1 3  -DORA could be secondary to prerenal/ATN +/- elevated vancomycin level (level 31 on 8/1/19) contributing, another concern remains paraproteinemia related renal involvement  -creatinine seems to have plateaued at 1 5 to 1 7 over last several days  Creatinine slightly increased to 1 7 today  -urinalysis bland without hematuria or proteinuria  Upc ratio 370 mg   -renal ultrasound shows no hydronephrosis, normal echogenicity  -BMP in a m , avoid nephrotoxins or NSAIDs   -bladder scan for PVR nonsignificant prior     Significant azotemia in the setting of stable renal function  -this could be secondary to use of steroids, status post prednisone last dose today  Now remains off  -stool occult blood results to follow     Bi clonal gammopathy, 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  -FLC ratio 4 6  -close hematology follow-up     Diastolic CHF, prior echo shows EF 20%, grade 3 diastolic dysfunction  -with slightly increasing creatinine, will decrease torsemide to 20 mg daily  Weight has been slowly reducing over last several days   -continue to monitor, not on any respiratory distress, remains on room air     Skin rash, seems to be significantly improving as per patient    Now steroid has been discontinued, status post last dose today   -complements negative, Anca negative COLLEEN negative rheumatoid factor negative     Mild hyperphosphatemia, patient is restarted on Calcium Acetate     Discussed with primary team     SUBJECTIVE:  Patient seen and examined at bedside  No chest pain, shortness of breath, nausea, vomiting, abdominal pain    OBJECTIVE:  Current Weight: Weight - Scale: (!) 149 kg (327 lb 13 2 oz)  Vitals:    08/11/19 0743   BP: 116/69   Pulse: 69   Resp: 18   Temp: 97 9 °F (36 6 °C)   SpO2: 96%       Intake/Output Summary (Last 24 hours) at 8/11/2019 1403  Last data filed at 8/11/2019 0401  Gross per 24 hour   Intake --   Output 2700 ml   Net -2700 ml       Physical Examination:  General:  Sitting in bed, no acute distress   Eyes:  Mild conjunctival pallor present  ENT:  External examination of ears and nose unremarkable  Neck:  Supple  Respiratory:  Bilateral air entry present, no crackles appreciated  CVS:  S1, S2 present  GI:  Soft, nontender, nondistended  CNS:  Active alert oriented x3  Extremities:  Left AKA, no significant edema in right lower extremity, chronic minimal edema present  Skin:  Chronic skin changes in right leg, generalized rash improving       Medications:    Current Facility-Administered Medications:     acetaminophen (TYLENOL) tablet 650 mg, 650 mg, Oral, Q6H PRN, Mellissae Mich Sánchez PA-C    acetaminophen (TYLENOL) tablet 650 mg, 650 mg, Oral, Q4H PRN, Winsome Jacobson MD    albuterol (PROVENTIL HFA,VENTOLIN HFA) inhaler 2 puff, 2 puff, Inhalation, Q6H PRN, Taylor Sánchez PA-C, 2 puff at 07/31/19 0749    atorvastatin (LIPITOR) tablet 20 mg, 20 mg, Oral, Daily, Suzette JOEY Sánchez, 20 mg at 08/11/19 0817    calcium acetate (PHOSLO) capsule 667 mg, 667 mg, Oral, TID With Meals, Alis Gonzalez MD, 667 mg at 08/11/19 1158    clotrimazole (LOTRIMIN) 1 % cream, , Topical, BID, Alis Gonzalez MD    diphenhydrAMINE (BENADRYL) tablet 25 mg, 25 mg, Oral, Q8H PRN, Alis Gonzalez MD, 25 mg at 08/09/19 2229    fentanyl citrate (PF) 100 MCG/2ML, , Intravenous, Code/Trauma/Sedation Med, Daphine Primas, MD, 25 mcg at 08/09/19 1444    fluticasone-vilanterol (BREO ELLIPTA) 100-25 mcg/inh inhaler 1 puff, 1 puff, Inhalation, Daily, Quinn Sánchez PA-C, 1 puff at 08/11/19 0741    hydrALAZINE (APRESOLINE) injection 10 mg, 10 mg, Intravenous, Q6H PRN, Horacio Garcia MD    HYDROcodone-acetaminophen (NORCO) 5-325 mg per tablet 2 tablet, 2 tablet, Oral, Q4H PRN, Ching Mora PA-C, 2 tablet at 08/10/19 1709    HYDROmorphone (DILAUDID) injection 0 5 mg, 0 5 mg, Intravenous, Q2H PRN, Dalila Mora PA-C, 0 5 mg at 08/09/19 0813    insulin glargine (LANTUS) subcutaneous injection 14 Units 0 14 mL, 14 Units, Subcutaneous, HS, Horacio Garcia MD, 14 Units at 08/10/19 2112    insulin lispro (HumaLOG) 100 units/mL subcutaneous injection 2-12 Units, 2-12 Units, Subcutaneous, TID AC, 6 Units at 08/11/19 1159 **AND** Fingerstick Glucose (POCT), , , TID AC, Suzette Sánchez PA-C    insulin lispro (HumaLOG) 100 units/mL subcutaneous injection 30 Units, 30 Units, Subcutaneous, TID With Meals, Quinn Sánchez PA-C, 30 Units at 08/11/19 1200    metoprolol tartrate (LOPRESSOR) tablet 25 mg, 25 mg, Oral, Q12H [address], Suzette Sánchez PA-C, 25 mg at 08/11/19 0461    midazolam (VERSED) injection, , , Code/Trauma/Sedation Med, Vikki Mcghee MD, 0 5 mg at 08/09/19 1444    torsemide (DEMADEX) tablet 20 mg, 20 mg, Oral, BID (diuretic), Horacio Garcia MD, 20 mg at 08/11/19 0824    warfarin (COUMADIN) tablet 7 5 mg, 7 5 mg, Oral, Daily (warfarin), Horacio Garcia MD, 7 5 mg at 08/10/19 1709    Laboratory Results:  Results from last 7 days   Lab Units 08/11/19  0539 08/10/19  0508 08/09/19  0537 08/09/19  0536 08/08/19  0517 08/07/19  0601 08/06/19  0448 08/05/19  0541   WBC Thousand/uL  --  8 81 7 51  --   --  8 81  --  6 51   HEMOGLOBIN g/dL  --  9 7* 9 2*  --   --  8 8*  --  8 4*   HEMATOCRIT %  --  32 6* 31 2*  --   --  29 8*  --  28 8*   PLATELETS Thousands/uL  --  410* 413*  --   --  368  --  332   SODIUM mmol/L 140 138  --  140 139 139 139 139   POTASSIUM mmol/L 4 5 4 4  --  4 4 4 5 4 6 4 7 4 9   CHLORIDE mmol/L 101 101  --  102 100 101 102 104   CO2 mmol/L 29 24  --  25 27 26 28 24   BUN mg/dL 105* 92*  --  94* 81* 78* 74* 70*   CREATININE mg/dL 1 72* 1 54*  --  1 68* 1 71* 1 54* 1 74* 1 76*   CALCIUM mg/dL 9 3 9 3  --  9 1 8 8 9 3 8 4 8 6   MAGNESIUM mg/dL  --   --   --  1 7  --   --   --  2 2   PHOSPHORUS mg/dL  --  5 0*  --  5 3* 5 2* 5 8* 4 6* 5 4*       CT bone marrow biopsy and aspiration   Final Result by Shanice Pappas MD (08/09 1806)   Impression: Successful percutaneous bone marrow aspiration and bone core biopsy of right ileum, yielding a specimen adequate for evaluation   A full pathology report will follow  Workstation performed: [id number]         XR bone survey complete >12 mos   Final Result by Matt Tejada MD (08/08 1653)      Patient status post above knee amputation of the left lower extremity  No lytic or blastic bone lesions are appreciated  Right PICC line noted            Workstation performed: [id number]         XR chest portable   Final Result by Jonathon Jordan MD (08/04 1902)   1  Stable enlargement of the cardiac silhouette and pulmonary vascular congestion  2   Bibasilar linear opacities compatible with atelectasis or scarring  Workstation performed: [id number]         RADIOLOGY RESULTS   Final Result by  (08/02 0950)      US kidney and bladder   Final Result by Jersey Tang MD (07/30 1637)      No evidence of nephrolithiasis or hydronephrosis  Stable left renal cyst measuring 1 3 cm  Unremarkable bladder  Workstation performed: [id number]         MRI foot/forefoot toes left w wo contrast   Final Result by Jean Claude Chong MD (07/29 2010)      XR chest PICC line portable   Final Result by Selwyn Caballero MD (07/29 1506)      Satisfactory right-sided PICC line placement  Cardiomegaly with mild vascular congestion/volume overload              Workstation performed: [id number]         VAS lower limb arterial duplex, complete bilateral   Final Result by Garrett Spangler MD (07/29 1122)      XR foot 3+ views LEFT   Final Result by Jasson Champion DO (07/25 1416)   Limited exam due to patient's condition  Worsening Charcot joint at midfoot  Worsening soft tissue swelling  Shallow ulceration plantar aspect of the midfoot  Could not radiographically confirm an abscess or osteomyelitis  Recommend MRI or Ceretec bone scan if osteomyelitis is clinically suspected  The study was marked in [facility] for immediate notification  Workstation performed: [id number]             Portions of the record may have been created with voice recognition software  Occasional wrong word or "sound a like" substitutions may have occurred due to the inherent limitations of voice recognition software  Read the chart carefully and recognize, using context, where substitutions have occurred

## 2019-08-11 NOTE — ASSESSMENT & PLAN NOTE
· Patient had volume overload post surgery  Was diuresed with IV Lasix initially and switched to torsemide  · Daily weight and I&Os  · Appears euvolemic  · Continue on torsemide  · Continue beta-blockers and atorvastatin      Weight (last 2 days)     Date/Time   Weight    08/11/19 0600   (!) 149 (327 82)    08/10/19 0533   (!) 149 (328 27)    08/09/19 0600   (!) 149 (328 27)              Intake/Output Summary (Last 24 hours) at 8/11/2019 1153  Last data filed at 8/11/2019 0401  Gross per 24 hour   Intake --   Output 2700 ml   Net -2700 ml

## 2019-08-11 NOTE — PROGRESS NOTES
Progress Note - Lashanda Koenig 1950, 79 y o  male MRN: [id number]    Unit/Bed#: [address] [place] [address] Encounter: [id number]    Primary Care Provider: Renita Estrada MD   Date and time admitted to hospital: 7/25/2019  1:04 PM        Biclonal gammopathy  Assessment & Plan  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  Multiple myeloma workup is pending  Hematology consult appreciated  Patient underwent bone marrow biopsy on August 9  Follow-up biopsy results  Skeletal survey did not reveal any lytic or blastic lesions  Outpatient follow-up with Hematology  Patient is medically stable for discharge  Awaiting placement to skilled nursing rehab in a m  Rash  Assessment & Plan  Improved  Rash has improved  Will discontinue prednisone      Acute renal failure superimposed on stage 3 chronic kidney disease ([facility] [address] )  Assessment & Plan  · Creatinine 1 72 today from baseline of 1 2-1 4  · Nephrology follow-up noted  · Ultrasound renals = No evidence of nephrolithiasis or hydronephrosis  Stable left renal cyst measuring 1 3 cm  Unremarkable bladder  · Bladder scan and PVRs  · Avoid hypotension/NSAIDs    Moderate persistent asthma without complication  Assessment & Plan  · cont albuterol/Advair    Acute on chronic diastolic congestive heart failure ([facility] [address] )  Assessment & Plan  · Patient had volume overload post surgery  Was diuresed with IV Lasix initially and switched to torsemide  · Daily weight and I&Os  · Appears euvolemic  · Continue on torsemide  · Continue beta-blockers and atorvastatin      Weight (last 2 days)     Date/Time   Weight    08/11/19 0600   (!) 149 (327 82)    08/10/19 0533   (!) 149 (328 27)    08/09/19 0600   (!) 149 (328 27)              Intake/Output Summary (Last 24 hours) at 8/11/2019 1153  Last data filed at 8/11/2019 0401  Gross per 24 hour   Intake --   Output 2700 ml   Net -2700 ml       Morbid obesity ([facility] [address] )  Assessment & Plan  · Encourage weight loss    Chronic atrial fibrillation [facility])  Assessment & Plan  · INR is therapeutic  Heparin drip was discontinued  · Continue on Coumadin and follow-up PT INR  · Continue Lopressor for rate control  Essential hypertension  Assessment & Plan  · continue torsemide/ beta-blocker  · Cozaar on hold    Chronic venous stasis dermatitis of both lower extremities  Assessment & Plan  · Chronic, POA  · S/P left AKA  · podiatry/wound care  * Type 2 diabetes mellitus with left diabetic foot ulcer [facility])  Assessment & Plan  Lab Results   Component Value Date    HGBA1C 9 4 (H) 12/27/2018       Recent Labs     08/10/19  2059 08/11/19  0809 08/11/19  1109 08/11/19  1112   POCGLU 179* 321* 21* 285*     · 07/31/2019: Left AKA  · Patient has completed the course of IV Vanco/Cefepime as per ID  · Monitor WBC and fever curve  · Continue Novolog/Lantus/sliding scale coverage  · Podiatry, ID general surgery on board  · Continue with current pain regimen  · Stable from surgical standpoint for discharge        Labs & Imaging: I have personally reviewed pertinent reports  VTE Pharmacologic Prophylaxis: Warfarin (Coumadin)  VTE Mechanical Prophylaxis: sequential compression device    Code Status:   Level 1 - Full Code    Patient Centered Rounds: I have performed bedside rounds with nursing staff today  Discussions with Specialists or Other Care Team Provider:      Education and Discussions with Family / Patient: Family    Current Length of Stay: 17 day(s)    Current Patient Status: Inpatient   Certification Statement: The patient will continue to require additional inpatient hospital stay due to see my assessment and plan  Subjective:   Patient is seen and examined at bedside  No new complaints  Rash is almost resolved  Afebrile  All other ROS are negative  Objective:    Vitals: Blood pressure 116/69, pulse 69, temperature 97 9 °F (36 6 °C), temperature source Oral, resp   rate 18, height 6' 3" (1 905 m), weight (!) 149 kg (327 lb 13 2 oz), SpO2 96 % ,Body mass index is 40 98 kg/m²  SPO2 RA Rest      ED to Hosp-Admission (Current) from 7/25/2019 in [facility] S[facility]   SpO2  96 %   SpO2 Activity  At Rest   O2 Device  None (Room air)   O2 Flow Rate  2 L/min        I&O:     Intake/Output Summary (Last 24 hours) at 8/11/2019 1157  Last data filed at 8/11/2019 0401  Gross per 24 hour   Intake --   Output 2700 ml   Net -2700 ml       Physical Exam:    General- Alert, sitting comfortably in bed  Not in any acute distress  HEENT- JENNIE, EOM intact  Neck- Supple, No JVD  CVS- regular, S1 and S2 normal   Chest- Bilateral Air entry, No rhochi, crackles or wheezing present  Abdomen- soft, nontender, not distended, no guarding or rigidity, BS+  Extremities- s/p left AKA  CNS-   Alert, awake and orientedx3  No focal deficits present      Invasive Devices     Peripherally Inserted Central Catheter Line            PICC Line 51/13/37 Right Basilic 12 days                      Social History  reviewed  Family History   Problem Relation Age of Onset    Other Mother         CARDIAC DISORDER     Diabetes Mother     Cancer Father     Other Family         CARDIAC DISORDER     Diabetes Family     Cancer Family     Mental illness Family     reviewed    Meds:  Current Facility-Administered Medications   Medication Dose Route Frequency Provider Last Rate Last Dose    acetaminophen (TYLENOL) tablet 650 mg  650 mg Oral Q6H PRN Suzette Sánchez PA-C        acetaminophen (TYLENOL) tablet 650 mg  650 mg Oral Q4H PRN Vito Meyer MD        albuterol (PROVENTIL HFA,VENTOLIN HFA) inhaler 2 puff  2 puff Inhalation Q6H PRN Suzette Sánchez PA-C   2 puff at 07/31/19 0749    atorvastatin (LIPITOR) tablet 20 mg  20 mg Oral Daily Suzette Sánchez PA-C   20 mg at 08/11/19 0817    calcium acetate (PHOSLO) capsule 667 mg  667 mg Oral TID With Meals Kimmy Marquez MD   667 mg at 08/11/19 0817    clotrimazole (LOTRIMIN) 1 % cream   Topical BID Alis Gonzalez MD        diphenhydrAMINE (BENADRYL) tablet 25 mg  25 mg Oral Q8H PRN Alis Goznalez MD   25 mg at 08/09/19 2225    fentanyl citrate (PF) 100 MCG/2ML   Intravenous Code/Trauma/Sedation Med Winsome Jacobson MD   25 mcg at 08/09/19 1444    fluticasone-vilanterol (BREO ELLIPTA) 100-25 mcg/inh inhaler 1 puff  1 puff Inhalation Daily Suzette Sánchez PA-C   1 puff at 08/11/19 0741    hydrALAZINE (APRESOLINE) injection 10 mg  10 mg Intravenous Q6H PRN Alis Gonzalez MD        HYDROcodone-acetaminophen (NORCO) 5-325 mg per tablet 2 tablet  2 tablet Oral Q4H PRN Ellie Jordan PA-C   2 tablet at 08/10/19 1709    HYDROmorphone (DILAUDID) injection 0 5 mg  0 5 mg Intravenous Q2H PRN Dalila Mora PA-C   0 5 mg at 08/09/19 0813    insulin glargine (LANTUS) subcutaneous injection 14 Units 0 14 mL  14 Units Subcutaneous HS Alis Gonzalez MD   14 Units at 08/10/19 2112    insulin lispro (HumaLOG) 100 units/mL subcutaneous injection 2-12 Units  2-12 Units Subcutaneous TID AC Suzette Sánchez PA-C   8 Units at 08/11/19 0815    insulin lispro (HumaLOG) 100 units/mL subcutaneous injection 30 Units  30 Units Subcutaneous TID With Meals Suzette Sánchez PA-C   30 Units at 08/11/19 0816    metoprolol tartrate (LOPRESSOR) tablet 25 mg  25 mg Oral Q12H [facility] Suzette Sánchez PA-C   25 mg at 08/11/19 2093    midazolam (VERSED) injection    Code/Trauma/Sedation Med Winsome Jacobson MD   0 5 mg at 08/09/19 1444    torsemide (DEMADEX) tablet 20 mg  20 mg Oral BID (diuretic) Alis Gonzalez MD   20 mg at 08/11/19 1583    warfarin (COUMADIN) tablet 7 5 mg  7 5 mg Oral Daily (warfarin) Alis Gonzalez MD   7 5 mg at 08/10/19 1709      Medications Prior to Admission   Medication    albuterol (PROVENTIL HFA,VENTOLIN HFA) 90 mcg/act inhaler    allopurinol (ZYLOPRIM) 300 mg tablet    atorvastatin (LIPITOR) 20 mg tablet    BD INSULIN SYRINGE U/F 31G X 5/16" 0 5 ML MISC    BD PEN NEEDLE HILARIO U/F 32G X 4 MM MISC    fluticasone-salmeterol (ADVAIR DISKUS, WIXELA INHUB) 250-50 mcg/dose inhaler    glucose blood (ONE TOUCH ULTRA TEST) test strip    insulin aspart (NOVOLOG FLEXPEN) 100 Units/mL injection pen    Insulin Pen Needle 31G X 5 MM MISC    losartan (COZAAR) 50 mg tablet    metFORMIN (GLUCOPHAGE) 1000 MG tablet    metoprolol tartrate (LOPRESSOR) 25 mg tablet    torsemide (DEMADEX) 20 mg tablet    warfarin (COUMADIN) 5 mg tablet       Labs:  Results from last 7 days   Lab Units 08/10/19  0508 08/09/19  0537 08/07/19  0601   WBC Thousand/uL 8 81 7 51 8 81   HEMOGLOBIN g/dL 9 7* 9 2* 8 8*   HEMATOCRIT % 32 6* 31 2* 29 8*   PLATELETS Thousands/uL 410* 413* 368   NEUTROS PCT % 73 72 75   LYMPHS PCT % 19 19 15   MONOS PCT % 6 6 6   EOS PCT % 0 0 1     Results from last 7 days   Lab Units 08/11/19  0539 08/10/19  0508 08/09/19  0536 08/08/19  0517   POTASSIUM mmol/L 4 5 4 4 4 4 4 5   CHLORIDE mmol/L 101 101 102 100   CO2 mmol/L 29 24 25 27   BUN mg/dL 105* 92* 94* 81*   CREATININE mg/dL 1 72* 1 54* 1 68* 1 71*   CALCIUM mg/dL 9 3 9 3 9 1 8 8   ALK PHOS U/L  --   --   --  150*   ALT U/L  --   --   --  19   AST U/L  --   --   --  21     Lab Results   Component Value Date    TROPONINI <0 02 03/23/2018    TROPONINI <0 02 10/08/2017    CKTOTAL 53 03/23/2018    CKTOTAL 55 10/08/2017     Results from last 7 days   Lab Units 08/11/19  0539 08/10/19  0508 08/09/19  0536   INR  2 28* 2 13* 1 81*     Lab Results   Component Value Date    BLOODCX No Growth After 5 Days  07/25/2019    BLOODCX No Growth After 5 Days  07/25/2019    BLOODCX No Growth After 5 Days   12/25/2018    WOUNDCULT 4+ Growth of Proteus mirabilis (A) 07/25/2019    WOUNDCULT 4+ Growth of  07/25/2019    WOUNDCULT (A) 07/25/2019     4+ Growth of Methicillin Resistant Staphylococcus aureus    WOUNDCULT 1+ Growth of Proteus mirabilis (A) 07/25/2019    WOUNDCULT 4+ Growth of  07/25/2019         Imaging:  Results for orders placed during the hospital encounter of 07/25/19   XR chest portable    Narrative CHEST     INDICATION:   chf     COMPARISON:  Chest radiograph 7/29/2019    EXAM PERFORMED/VIEWS:  XR CHEST PORTABLE      FINDINGS:  Right-sided PICC line with tip in the region of the cavoatrial junction  Heart shadow is enlarged but unchanged from prior exam     There is pulmonary vascular congestion  Streaky bibasilar opacities  No large effusion or pneumothorax  Osseous structures appear within normal limits for patient age  Impression 1  Stable enlargement of the cardiac silhouette and pulmonary vascular congestion  2   Bibasilar linear opacities compatible with atelectasis or scarring  Workstation performed: [id number]       Results for orders placed in visit on 05/02/19   XR chest pa & lateral    Narrative CHEST     INDICATION:   J45 40: Moderate persistent asthma, uncomplicated  H43 52: Dyspnea, unspecified  COMPARISON:  One view chest 3/23/2018    EXAM PERFORMED/VIEWS:  XR CHEST PA & LATERAL      FINDINGS:    Cardiac silhouette is enlarged  Aortic calcification is present  Minimal bibasilar subsegmental atelectasis left greater than right  No pneumothorax, or pleural effusion  Mild distention of central pulmonary vessels  Multilevel thoracolumbar spondylosis  Impression Mild central pulmonary vascular congestion  Minimal bibasilar subsegmental atelectasis left greater than right          Workstation performed: [id number]         Last 24 Hours Medication List:     Current Facility-Administered Medications:  acetaminophen 650 mg Oral Q6H PRN [place], PA-C   acetaminophen 650 mg Oral Q4H PRN Vito Meyer MD   albuterol 2 puff Inhalation Q6H PRN [place], PA-C   atorvastatin 20 mg Oral Daily Suzette Sánchez PA-C   calcium acetate 667 mg Oral TID With Meals Kimmy Marquez MD   clotrimazole  Topical BID Kimmy Marquez MD   diphenhydrAMINE 25 mg Oral Q8H PRN Kimmy Marquez MD fentanyl citrate (PF)  Intravenous Code/Trauma/Sedation Med Celina Galeano MD   fluticasone-vilanterol 1 puff Inhalation Daily Alberto Britta Sánchez PA-C   hydrALAZINE 10 mg Intravenous Q6H PRN [place] Gongora MD   HYDROcodone-acetaminophen 2 tablet Oral Q4H PRN Dalila Mora PA-C   HYDROmorphone 0 5 mg Intravenous Q2H PRN Dalila Mora PA-C   insulin glargine 14 Units Subcutaneous HS Argentina Gongora MD   insulin lispro 2-12 Units Subcutaneous TID AC Suzette Sánchze PA-C   insulin lispro 30 Units Subcutaneous TID With Meals Alberto Sánchez PA-C   metoprolol tartrate 25 mg Oral Q12H [facility] Suzette Sánchez PA-C   midazolam   Code/Trauma/Sedation Med Celina Galeano MD   torsemide 20 mg Oral BID (diuretic) [place] Gongora MD   warfarin 7 5 mg Oral Daily (warfarin) [place] Gongora MD        Today, Patient Was Seen By: [place] Gongora MD    ** Please Note: Dictation voice to text software may have been used in the creation of this document   **

## 2019-08-11 NOTE — PLAN OF CARE
Problem: Potential for Falls  Goal: Patient will remain free of falls  Description  INTERVENTIONS:  - Assess patient frequently for physical needs  -  Identify cognitive and physical deficits and behaviors that affect risk of falls    -  [place] fall precautions as indicated by assessment   - Educate patient/family on patient safety including physical limitations  - Instruct patient to call for assistance with activity based on assessment  - Modify environment to reduce risk of injury  - Consider OT/PT consult to assist with strengthening/mobility  Outcome: Progressing     Problem: Prexisting or High Potential for Compromised Skin Integrity  Goal: Skin integrity is maintained or improved  Description  INTERVENTIONS:  - Identify patients at risk for skin breakdown  - Assess and monitor skin integrity  - Assess and monitor nutrition and hydration status  - Monitor labs (i e  albumin)  - Assess for incontinence   - Turn and reposition patient  - Assist with mobility/ambulation  - Relieve pressure over bony prominences  - Avoid friction and shearing  - Provide appropriate hygiene as needed including keeping skin clean and dry  - Evaluate need for skin moisturizer/barrier cream  - Collaborate with interdisciplinary team (i e  Nutrition, Rehabilitation, etc )   - Patient/family teaching  Outcome: Progressing     Problem: SKIN/TISSUE INTEGRITY - ADULT  Goal: Skin integrity remains intact  Description  INTERVENTIONS  - Identify patients at risk for skin breakdown  - Assess and monitor skin integrity  - Assess and monitor nutrition and hydration status  - Monitor labs (i e  albumin)  - Assess for incontinence   - Turn and reposition patient  - Assist with mobility/ambulation  - Relieve pressure over bony prominences  - Avoid friction and shearing  - Provide appropriate hygiene as needed including keeping skin clean and dry  - Evaluate need for skin moisturizer/barrier cream  - Collaborate with interdisciplinary team (i e  Nutrition, Rehabilitation, etc )   - Patient/family teaching  Outcome: Progressing  Goal: Incision(s), wounds(s) or drain site(s) healing without S/S of infection  Description  INTERVENTIONS  - Assess and document risk factors for skin impairment   - Assess and document dressing, incision, wound bed, drain sites and surrounding tissue  - Initiate Nutrition services consult and/or wound management as needed  Outcome: Progressing     Problem: MUSCULOSKELETAL - ADULT  Goal: Maintain or return mobility to safest level of function  Description  INTERVENTIONS:  - Assess patient's ability to carry out ADLs; assess patient's baseline for ADL function and identify physical deficits which impact ability to perform ADLs (bathing, care of mouth/teeth, toileting, grooming, dressing, etc )  - Assess/evaluate cause of self-care deficits   - Assess range of motion  - Assess patient's mobility; develop plan if impaired  - Assess patient's need for assistive devices and provide as appropriate  - Encourage maximum independence but intervene and supervise when necessary  - Involve family in performance of ADLs  - Assess for home care needs following discharge   - Request OT consult to assist with ADL evaluation and planning for discharge  - Provide patient education as appropriate  Outcome: Progressing     Problem: PAIN - ADULT  Goal: Verbalizes/displays adequate comfort level or baseline comfort level  Description  Interventions:  - Encourage patient to monitor pain and request assistance  - Assess pain using appropriate pain scale  - Administer analgesics based on type and severity of pain and evaluate response  - Implement non-pharmacological measures as appropriate and evaluate response  - Consider cultural and social influences on pain and pain management  - Notify physician/advanced practitioner if interventions unsuccessful or patient reports new pain  Outcome: Progressing     Problem: INFECTION - ADULT  Goal: Absence or prevention of progression during hospitalization  Description  INTERVENTIONS:  - Assess and monitor for signs and symptoms of infection  - Monitor lab/diagnostic results  - Monitor all insertion sites, i e  indwelling lines, tubes, and drains  - Monitor endotracheal (as able) and nasal secretions for changes in amount and color  - [place] appropriate cooling/warming therapies per order  - Administer medications as ordered  - Instruct and encourage patient and family to use good hand hygiene technique  - Identify and instruct in appropriate isolation precautions for identified infection/condition  Outcome: Progressing     Problem: SAFETY ADULT  Goal: Patient will remain free of falls  Description  INTERVENTIONS:  - Assess patient frequently for physical needs  -  Identify cognitive and physical deficits and behaviors that affect risk of falls    -  [place] fall precautions as indicated by assessment   - Educate patient/family on patient safety including physical limitations  - Instruct patient to call for assistance with activity based on assessment  - Modify environment to reduce risk of injury  - Consider OT/PT consult to assist with strengthening/mobility  Outcome: Progressing     Problem: DISCHARGE PLANNING  Goal: Discharge to home or other facility with appropriate resources  Description  INTERVENTIONS:  - Identify barriers to discharge w/patient and caregiver  - Arrange for needed discharge resources and transportation as appropriate  - Identify discharge learning needs (meds, wound care, etc )  - Arrange for interpretive services to assist at discharge as needed  - Refer to Case Management Department for coordinating discharge planning if the patient needs post-hospital services based on physician/advanced practitioner order or complex needs related to functional status, cognitive ability, or social support system  Outcome: Progressing     Problem: Knowledge Deficit  Goal: Patient/family/caregiver demonstrates understanding of disease process, treatment plan, medications, and discharge instructions  Description  Complete learning assessment and assess knowledge base  Interventions:  - Provide teaching at level of understanding  - Provide teaching via preferred learning methods  Outcome: Progressing     Problem: Nutrition/Hydration-ADULT  Goal: Nutrient/Hydration intake appropriate for improving, restoring or maintaining nutritional needs  Description  Monitor and assess patient's nutrition/hydration status for malnutrition (ex- brittle hair, bruises, dry skin, pale skin and conjunctiva, muscle wasting, smooth red tongue, and disorientation)  Collaborate with interdisciplinary team and initiate plan and interventions as ordered  Monitor patient's weight and dietary intake as ordered or per policy  Utilize nutrition screening tool and intervene per policy  Determine patient's food preferences and provide high-protein, high-caloric foods as appropriate       INTERVENTIONS:  - Monitor oral intake, urinary output, labs, and treatment plans  - Assess nutrition and hydration status and recommend course of action  - Evaluate amount of meals eaten  - Assist patient with eating if necessary   - Allow adequate time for meals  - Recommend/ encourage appropriate diets, oral nutritional supplements, and vitamin/mineral supplements  - Order, calculate, and assess calorie counts as needed  - Recommend, monitor, and adjust tube feedings and TPN/PPN based on assessed needs  - Assess need for intravenous fluids  - Provide specific nutrition/hydration education as appropriate  - Include patient/family/caregiver in decisions related to nutrition  Outcome: Progressing

## 2019-08-12 ENCOUNTER — TELEPHONE (OUTPATIENT)
Dept: NEPHROLOGY | Facility: CLINIC | Age: 69
End: 2019-08-12

## 2019-08-12 VITALS
HEART RATE: 72 BPM | WEIGHT: 315 LBS | BODY MASS INDEX: 39.17 KG/M2 | RESPIRATION RATE: 17 BRPM | SYSTOLIC BLOOD PRESSURE: 138 MMHG | OXYGEN SATURATION: 94 % | HEIGHT: 75 IN | TEMPERATURE: 97.5 F | DIASTOLIC BLOOD PRESSURE: 67 MMHG

## 2019-08-12 PROBLEM — R21 RASH: Status: RESOLVED | Noted: 2019-08-06 | Resolved: 2019-08-12

## 2019-08-12 LAB
ANION GAP SERPL CALCULATED.3IONS-SCNC: 10 MMOL/L (ref 4–13)
BASOPHILS # BLD AUTO: 0.05 THOUSANDS/ΜL (ref 0–0.1)
BASOPHILS NFR BLD AUTO: 1 % (ref 0–1)
BUN SERPL-MCNC: 106 MG/DL (ref 5–25)
CALCIUM SERPL-MCNC: 9.2 MG/DL (ref 8.3–10.1)
CHLORIDE SERPL-SCNC: 103 MMOL/L (ref 100–108)
CO2 SERPL-SCNC: 29 MMOL/L (ref 21–32)
CREAT SERPL-MCNC: 1.46 MG/DL (ref 0.6–1.3)
EOSINOPHIL # BLD AUTO: 0.04 THOUSAND/ΜL (ref 0–0.61)
EOSINOPHIL NFR BLD AUTO: 1 % (ref 0–6)
ERYTHROCYTE [DISTWIDTH] IN BLOOD BY AUTOMATED COUNT: 17.5 % (ref 11.6–15.1)
GFR SERPL CREATININE-BSD FRML MDRD: 49 ML/MIN/1.73SQ M
GLUCOSE SERPL-MCNC: 196 MG/DL (ref 65–140)
GLUCOSE SERPL-MCNC: 234 MG/DL (ref 65–140)
GLUCOSE SERPL-MCNC: 240 MG/DL (ref 65–140)
GLUCOSE SERPL-MCNC: 251 MG/DL (ref 65–140)
HCT VFR BLD AUTO: 35.9 % (ref 36.5–49.3)
HGB BLD-MCNC: 10.5 G/DL (ref 12–17)
IMM GRANULOCYTES # BLD AUTO: 0.18 THOUSAND/UL (ref 0–0.2)
IMM GRANULOCYTES NFR BLD AUTO: 2 % (ref 0–2)
INR PPP: 2.45 (ref 0.84–1.19)
LYMPHOCYTES # BLD AUTO: 1.66 THOUSANDS/ΜL (ref 0.6–4.47)
LYMPHOCYTES NFR BLD AUTO: 20 % (ref 14–44)
MCH RBC QN AUTO: 27.3 PG (ref 26.8–34.3)
MCHC RBC AUTO-ENTMCNC: 29.2 G/DL (ref 31.4–37.4)
MCV RBC AUTO: 94 FL (ref 82–98)
MONOCYTES # BLD AUTO: 0.53 THOUSAND/ΜL (ref 0.17–1.22)
MONOCYTES NFR BLD AUTO: 6 % (ref 4–12)
NEUTROPHILS # BLD AUTO: 5.81 THOUSANDS/ΜL (ref 1.85–7.62)
NEUTS SEG NFR BLD AUTO: 70 % (ref 43–75)
NRBC BLD AUTO-RTO: 0 /100 WBCS
PLATELET # BLD AUTO: 246 THOUSANDS/UL (ref 149–390)
PMV BLD AUTO: 11.2 FL (ref 8.9–12.7)
POTASSIUM SERPL-SCNC: 4.1 MMOL/L (ref 3.5–5.3)
PROTHROMBIN TIME: 26.3 SECONDS (ref 11.6–14.5)
RBC # BLD AUTO: 3.84 MILLION/UL (ref 3.88–5.62)
SODIUM SERPL-SCNC: 142 MMOL/L (ref 136–145)
WBC # BLD AUTO: 8.27 THOUSAND/UL (ref 4.31–10.16)

## 2019-08-12 PROCEDURE — 94760 N-INVAS EAR/PLS OXIMETRY 1: CPT

## 2019-08-12 PROCEDURE — 82948 REAGENT STRIP/BLOOD GLUCOSE: CPT

## 2019-08-12 PROCEDURE — 97530 THERAPEUTIC ACTIVITIES: CPT

## 2019-08-12 PROCEDURE — 85610 PROTHROMBIN TIME: CPT | Performed by: INTERNAL MEDICINE

## 2019-08-12 PROCEDURE — 80048 BASIC METABOLIC PNL TOTAL CA: CPT | Performed by: INTERNAL MEDICINE

## 2019-08-12 PROCEDURE — 97535 SELF CARE MNGMENT TRAINING: CPT

## 2019-08-12 PROCEDURE — 99232 SBSQ HOSP IP/OBS MODERATE 35: CPT | Performed by: INTERNAL MEDICINE

## 2019-08-12 PROCEDURE — 85025 COMPLETE CBC W/AUTO DIFF WBC: CPT | Performed by: INTERNAL MEDICINE

## 2019-08-12 PROCEDURE — 99239 HOSP IP/OBS DSCHRG MGMT >30: CPT | Performed by: INTERNAL MEDICINE

## 2019-08-12 PROCEDURE — 94640 AIRWAY INHALATION TREATMENT: CPT

## 2019-08-12 RX ORDER — INSULIN GLARGINE 100 [IU]/ML
18 INJECTION, SOLUTION SUBCUTANEOUS
Status: DISCONTINUED | OUTPATIENT
Start: 2019-08-12 | End: 2019-08-12 | Stop reason: HOSPADM

## 2019-08-12 RX ORDER — CLOTRIMAZOLE 1 %
CREAM (GRAM) TOPICAL 2 TIMES DAILY
Qty: 30 G | Refills: 0
Start: 2019-08-12 | End: 2020-01-22

## 2019-08-12 RX ORDER — WARFARIN SODIUM 7.5 MG/1
TABLET ORAL
Qty: 30 TABLET | Refills: 0
Start: 2019-08-12 | End: 2021-04-05 | Stop reason: DRUGHIGH

## 2019-08-12 RX ORDER — INSULIN GLARGINE 100 [IU]/ML
18 INJECTION, SOLUTION SUBCUTANEOUS
Qty: 10 ML | Refills: 0
Start: 2019-08-12 | End: 2019-12-11 | Stop reason: HOSPADM

## 2019-08-12 RX ORDER — HYDROCODONE BITARTRATE AND ACETAMINOPHEN 5; 325 MG/1; MG/1
1 TABLET ORAL EVERY 4 HOURS PRN
Qty: 30 TABLET | Refills: 0
Start: 2019-08-12 | End: 2019-08-22

## 2019-08-12 RX ADMIN — CALCIUM ACETATE 667 MG: 667 CAPSULE ORAL at 11:39

## 2019-08-12 RX ADMIN — ATORVASTATIN CALCIUM 20 MG: 20 TABLET, FILM COATED ORAL at 08:24

## 2019-08-12 RX ADMIN — TORSEMIDE 20 MG: 20 TABLET ORAL at 08:24

## 2019-08-12 RX ADMIN — INSULIN LISPRO 4 UNITS: 100 INJECTION, SOLUTION INTRAVENOUS; SUBCUTANEOUS at 08:25

## 2019-08-12 RX ADMIN — INSULIN LISPRO 30 UNITS: 100 INJECTION, SOLUTION INTRAVENOUS; SUBCUTANEOUS at 16:15

## 2019-08-12 RX ADMIN — CALCIUM ACETATE 667 MG: 667 CAPSULE ORAL at 08:24

## 2019-08-12 RX ADMIN — INSULIN LISPRO 2 UNITS: 100 INJECTION, SOLUTION INTRAVENOUS; SUBCUTANEOUS at 16:15

## 2019-08-12 RX ADMIN — INSULIN LISPRO 30 UNITS: 100 INJECTION, SOLUTION INTRAVENOUS; SUBCUTANEOUS at 08:25

## 2019-08-12 RX ADMIN — FLUTICASONE FUROATE AND VILANTEROL TRIFENATATE 1 PUFF: 100; 25 POWDER RESPIRATORY (INHALATION) at 07:53

## 2019-08-12 RX ADMIN — INSULIN LISPRO 6 UNITS: 100 INJECTION, SOLUTION INTRAVENOUS; SUBCUTANEOUS at 11:39

## 2019-08-12 RX ADMIN — CALCIUM ACETATE 667 MG: 667 CAPSULE ORAL at 16:15

## 2019-08-12 RX ADMIN — INSULIN LISPRO 30 UNITS: 100 INJECTION, SOLUTION INTRAVENOUS; SUBCUTANEOUS at 11:39

## 2019-08-12 RX ADMIN — METOPROLOL TARTRATE 25 MG: 25 TABLET, FILM COATED ORAL at 08:24

## 2019-08-12 RX ADMIN — CLOTRIMAZOLE: 10 CREAM TOPICAL at 08:24

## 2019-08-12 NOTE — PHYSICAL THERAPY NOTE
PHYSICAL THERAPY NOTE         08/12/19 0918   Pain Assessment   Pain Assessment No/denies pain   Pain Score No Pain   Restrictions/Precautions   Weight Bearing Precautions Per Order No   Other Precautions Contact/isolation; Fall Risk;Pain   General   Chart Reviewed Yes   Additional Pertinent History Bone marrow biopsy 8/9   Response to Previous Treatment Patient with no complaints from previous session  Family/Caregiver Present Yes   Cognition   Overall Cognitive Status WFL   Arousal/Participation Alert   Attention Within functional limits   Orientation Level Oriented X4   Memory Within functional limits   Following Commands Follows all commands and directions without difficulty   Subjective   Subjective "I am doing so much better "   Bed Mobility   Additional Comments Patient received sitting edge of bed   Transfers   Sit to Stand 3  Moderate assistance   Additional items Assist x 2   Stand to Sit 3  Moderate assistance   Additional items Assist x 2;Armrests; Verbal cues   Stand pivot 3  Moderate assistance   Additional items Assist x 2;Armrests; Verbal cues   Additional Comments Performed 3 sit<>stand transfers, 2 stand pivot transfers  Stood approx 1 minute for alaina-care after using commode  Balance   Static Sitting Good   Dynamic Sitting Fair +   Static Standing Fair   Dynamic Standing Fair -   Endurance Deficit   Endurance Deficit Yes   Activity Tolerance   Activity Tolerance Patient limited by fatigue   Medical Staff Made Aware PERLA Mcgrath) present for session  REGINO Ag   Nurse Made Aware MARKIE Figueroa   Assessment   Prognosis Fair   Problem List Decreased strength;Decreased endurance; Impaired balance;Decreased mobility; Decreased range of motion; Impaired sensation;Obesity;Pain;Decreased skin integrity   Assessment Patient made significant progress this session  No reports of pain   Able to perform transfers with mod A x 2 and stand pivot transfers with min A x 2  Requires less verbal cues and less assistance  Was highly motivated  He will require rehab post discharge to meet goals  Barriers to Discharge Inaccessible home environment;Decreased caregiver support   Goals   Patient Goals To get stronger   STG Expiration Date 08/12/19   Short Term Goal #1 Patient for d/c today  No updated goals necessary   Plan   Treatment/Interventions Functional transfer training;LE strengthening/ROM; Therapeutic exercise; Endurance training;Bed mobility;Gait training;Spoke to nursing;OT   Progress Progressing toward goals   PT Frequency 5x/wk   Recommendation   Recommendation Short-term skilled PT   Equipment Recommended Wheelchair;Walker   PT - OK to Discharge Yes   Rj Bolaños PT            Patient Name: Lashanda Koenig  RZIJG'A Date: 8/12/2019

## 2019-08-12 NOTE — DISCHARGE SUMMARY
Discharge- Lashanda Koenig 1950, 79 y o  male MRN: [id number]    Unit/Bed#: [address] [place] [address] Encounter: [id number]    Primary Care Provider: Renita Estrada MD   Date and time admitted to hospital: 7/25/2019  1:04 PM        * Type 2 diabetes mellitus with left diabetic foot ulcer [facility])  Assessment & Plan  Lab Results   Component Value Date    HGBA1C 9 4 (H) 12/27/2018       Recent Labs     08/11/19  1112 08/11/19  1557 08/11/19  2102 08/12/19  0707   POCGLU 285* 287* 328* 234*     Patient admitted with left foot wound and cellulitis  he was started on broad-spectrum antibiotics and was seen by Podiatry  He completed IV antibiotic therapy MRI of the foot was done that showed no osteomyelitis but the wound was deemed unsalvageable  Surgery was consulted and patient underwent left AKA on July 31st     Patient's left AKA surgical incision looks clean without any signs of cellulitis on day of discharge  His discharged on hydrocodone for pain control  Patient's blood sugars are uncontrolled, I increased Lantus to 18 units in the evening and he will continue NovoLog 30 units before meals  Patient is discharged in stable condition to SNF for rehab    I discussed the case with patient's wife at the bedside in detail      Acute on chronic diastolic congestive heart failure [facility])  Assessment & Plan   Patient developed acute diastolic CHF after surgery due to volume overload  Was started with IV Lasix and p o  Torsemide was resumed  His discharged on torsemide 20 mg p o  B i d  Today  His lungs are clear to auscultation without any wheezing or crackles on day of discharge      Weight (last 2 days)     Date/Time   Weight    08/12/19 0600   (!) 147 (323 86)    08/11/19 0600   (!) 149 (327 82)    08/10/19 0533   (!) 149 (328 27)              Intake/Output Summary (Last 24 hours) at 8/12/2019 0826  Last data filed at 8/12/2019 0301  Gross per 24 hour   Intake 420 ml   Output 1725 ml   Net -1305 ml       Acute renal failure superimposed on stage 3 chronic kidney disease [facility])  Assessment & Plan  Patient developed acute kidney injury in the hospital which was most likely due to prerenal causes  Ultrasound abdomen showed no hydronephrosis and he had no urinary tension  Patient was followed by Nephrology  Patient's creatinine decreased to 1 46 of discharge  His losartan is on hold on discharge, he will have a repeat BMP on August 14  Chronic atrial fibrillation [facility])  Assessment & Plan  Patient has chronic atrial fibrillation with controlled rates on Lopressor 25 mg p o  B i d  His INR is 2 45 today  He is discharged on warfarin 7 5 mg daily and will have an INR done on August 14th      Moderate persistent asthma without complication  Assessment & Plan  Patient had no asthma exacerbation during the hospital stay he will continue his Advair as an outpatient    Biclonal gammopathy  Assessment & Plan  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  Patient underwent bone marrow biopsy  He will need to follow up with Hematology as outpatient  Skeletal survey did not reveal any lytic or blastic lesions      Morbid obesity ([facility] [address] )  Assessment & Plan  Patient's BMI is 40 48, his morbid obesity            Hospital Course:     Nuno Cantu is a 79 y o  male patient who originally presented to the hospital on   Admission Orders (From admission, onward)     Ordered        07/25/19 1452  Inpatient Admission (expected length of stay for this patient Order details is greater than two midnights)  Once                  due to left foot nonhealing wound with cellulitis    Please see above list of diagnoses and related plan for additional information  Condition at Discharge:  good      Discharge instructions/Information to patient and family:   See after visit summary for information provided to patient and family        Provisions for Follow-Up Care:  See after visit summary for information related to follow-up care and any pertinent home health orders  Disposition:     [facility]       Discharge Statement:  I spent 34 minutes discharging the patient  This time was spent on the day of discharge  I had direct contact with the patient on the day of discharge  Greater than 50% of the total time was spent examining patient, answering all patient questions, arranging and discussing plan of care with patient as well as directly providing post-discharge instructions  Additional time then spent on discharge activities  Discharge Medications:  See after visit summary for reconciled discharge medications provided to patient and family        ** Please Note: This note has been constructed using a voice recognition system **

## 2019-08-12 NOTE — TELEPHONE ENCOUNTER
Patient is currently still in the hospital  I will call the patient to schedule a hospital follow up appointment for DORA when he is discharged from the hospital

## 2019-08-12 NOTE — TRANSPORTATION MEDICAL NECESSITY
Section I - General Information    Name of Patient: Kathy                  : 1950    Medicare #: [id number]  Transport Date: 19 (PCS is valid for round trips on this date and for all repetitive trips in the 60-day range as noted below )  Origin: [place] [address]: [facility]  Is the pt's stay covered under Medicare Part A (PPS/DRG)   []     Closest appropriate facility? If no, why is transport to more distant facility required? Yes  If hospice pt, is this transport related to pt's terminal illness? NA       Section II - Medical Necessity Questionnaire  Ambulance transportation is medically necessary only if other means of transport are contraindicated or would be potentially harmful to the patient  To meet this requirement, the patient must either be "bed confined" or suffer from a condition such that transport by means other than ambulance is contraindicated by the patient's condition  The following questions must be answered by the medical professional signing below for this form to be valid:    1)  Describe the MEDICAL CONDITION (physical and/or mental) of this patient AT [address] [place] [address] that requires the patient to be transported in an ambulance and why transport by other means is contraindicated by the patient's condition: L AKA, fall risk, assist x2 for transfers    2) Is the patient "bed confined" as defined below? No  To be "be confined" the patient must satisfy all three of the following conditions: (1) unable to get up from bed without Assistance; AND (2) unable to ambulate; AND (3) unable to sit in a chair or wheelchair  3) Can this patient safely be transported by car or wheelchair van (i e , seated during transport without a medical attendant or monitoring)?    No    4) In addition to completing questions 1-3 above, please check any of the following conditions that apply*:   *Note: supporting documentation for any boxes checked must be maintained in the patient's medical records  If hosp-hosp transfer, describe services needed at 2nd facility not available at 1st facility? Moderate/severe pain on movement   Special handling/isolation/infection control precautions required   Morbid obesity requires additional personnel/equipment to safely handle patient   Other(specify) fall risk, impaired balance      Section III - Signature of Physician or Healthcare Professional  I certify that the above information is true and correct based on my evaluation of this patient, and represent that the patient requires transport by ambulance and that other forms of transport are contraindicated  I understand that this information will be used by the Centers for Medicare and Medicaid Services (CMS) to support the determination of medical necessity for ambulance services, and I represent that I have personal knowledge of the patient's condition at time of transport  []  If this box is checked, I also certify that the patient is physically or mentally incapable of signing the ambulance service's claim and that the institution with which I am affiliated has furnished care, services, or assistance to the patient  My signature below is made on behalf of the patient pursuant to 42 CFR §424 36(b)(4)   In accordance with 42 CFR §424 37, the specific reason(s) that the patient is physically or mentally incapable of signing the claim form is as follows:       Signature of Physician* or Healthcare Professional______________________________________________________________  Signature Date 08/12/19 (For scheduled repetitive transports, this form is not valid for transports performed more than 60 days after this date)    Printed Name & Credentials of Physician or Healthcare Professional (MD, DO, RN, etc )________________________________  *Form must be signed by patient's attending physician for scheduled, repetitive transports   For non-repetitive, unscheduled ambulance transports, if unable to obtain the signature of the attending physician, any of the following may sign (choose appropriate option below)  [] Physician Assistant []  Clinical Nurse Specialist []  Registered Nurse  []  Nurse Practitioner  [] Discharge Planner

## 2019-08-12 NOTE — SOCIAL WORK
Continue to follow  Call placed to Pt's insurance([employer]: [phone]), obtained pending reference # [id number]  Faxed requested information to [phone]  Await determination  Pt still in agreement for [facility]

## 2019-08-12 NOTE — PROGRESS NOTES
NEPHROLOGY PROGRESS NOTE   Michael Guerrero 79 y o  male MRN: [id number]  Unit/Bed#: [address] [place] [address] Encounter: [id number]      ASSESSMENT & PLAN:    30-year-old male with a past medical history of atrial fibrillation, hypertension, congestive heart failure, peripheral vascular disease, COPD when initially presented with lower extremity pain with a wound subsequently requiring left lower extremity amputation complicated by an acute kidney injury on stage 3 chronic kidney disease    1  Acute kidney injury on stage 3 chronic kidney disease with a baseline creatinine 1 2-1 3  -thought to be secondary to pre renal azotemia/ATN/potentially elevated vancomycin levels with a concern for paraproteinemia with a creatinine that was as high as 1 7 at down to 1 46  -urinalysis without any hematuria or proteinuria  -renal ultrasound shows no hydronephrosis and normal echogenicity    2  Significant azotemia  -improved    3  By clonal gammopathy  -status post bone marrow biopsy  -close hematology follow-up    4  Diastolic congestive heart failure  -ejection fraction of 60 percent  -grade 3 diastolic dysfunction  -continue torsemide 20 milligrams daily as an outpatient    5   Mild hyperphosphatemia  -currently on Calcium Acetate    -scheduled for discharge today will schedule with outpatient follow-up he lives near [address] [place] [address]:  Patient was seen today denies any acute chest pain or shortness of Breath fevers chills nausea vomiting diarrhea or constipation    OBJECTIVE:  Current Weight: Weight - Scale: (!) 147 kg (323 lb 13 7 oz)  Vitals:    08/12/19 0753   BP:    Pulse:    Resp:    Temp:    SpO2: 94%       Intake/Output Summary (Last 24 hours) at 8/12/2019 1500  Last data filed at 8/12/2019 1301  Gross per 24 hour   Intake 780 ml   Output 2550 ml   Net -1770 ml       General: conscious, cooperative, in not acute distress  Eyes: conjunctivae pink, anicteric sclerae  ENT: lips and mucous membranes moist  Neck: supple, no JVD  Chest: clear breath sounds bilateral, no crackles, ronchus or wheezings  CVS: distinct S1 & S2, normal rate, regular rhythm  Abdomen: soft, non-tender, non-distended, normoactive bowel sounds  Extremities:  Left above-the-knee amputation chronic skin changes on the right leg trace edema  Skin: no rash  Neuro: awake, alert, oriented      Medications:    Current Facility-Administered Medications:     acetaminophen (TYLENOL) tablet 650 mg, 650 mg, Oral, Q6H PRN, Quinn Sánchez PA-C    acetaminophen (TYLENOL) tablet 650 mg, 650 mg, Oral, Q4H PRN, Vikki Mcghee MD    albuterol (PROVENTIL HFA,VENTOLIN HFA) inhaler 2 puff, 2 puff, Inhalation, Q6H PRN, Suzette Sánchez PA-C, 2 puff at 07/31/19 0749    atorvastatin (LIPITOR) tablet 20 mg, 20 mg, Oral, Daily, Suzette Sánchez PA-C, 20 mg at 08/12/19 0824    calcium acetate (PHOSLO) capsule 667 mg, 667 mg, Oral, TID With Meals, Horacio Garcia MD, 667 mg at 08/12/19 1139    clotrimazole (LOTRIMIN) 1 % cream, , Topical, BID, Horacio Garcia MD    diphenhydrAMINE (BENADRYL) tablet 25 mg, 25 mg, Oral, Q8H PRN, Horacio Garcia MD, 25 mg at 08/09/19 2225    fentanyl citrate (PF) 100 MCG/2ML, , Intravenous, Code/Trauma/Sedation Med, Vikki Mcghee MD, 25 mcg at 08/09/19 1444    fluticasone-vilanterol (BREO ELLIPTA) 100-25 mcg/inh inhaler 1 puff, 1 puff, Inhalation, Daily, Suzette Sánchez PA-C, 1 puff at 08/12/19 0753    hydrALAZINE (APRESOLINE) injection 10 mg, 10 mg, Intravenous, Q6H PRN, Horacio Garcia MD    HYDROcodone-acetaminophen (NORCO) 5-325 mg per tablet 2 tablet, 2 tablet, Oral, Q4H PRN, Ching Mora PA-C, 2 tablet at 08/11/19 2118    HYDROmorphone (DILAUDID) injection 0 5 mg, 0 5 mg, Intravenous, Q2H PRN, Dalila Mora PA-C, 0 5 mg at 08/09/19 0813    insulin glargine (LANTUS) subcutaneous injection 18 Units 0 18 mL, 18 Units, Subcutaneous, , Manuela Mercedes MD    insulin lispro (HumaLOG) 100 units/mL subcutaneous injection 2-12 Units, 2-12 Units, Subcutaneous, TID AC, 6 Units at 08/12/19 1139 **AND** Fingerstick Glucose (POCT), , , TID AC, Suzette Sánchez PA-C    insulin lispro (HumaLOG) 100 units/mL subcutaneous injection 30 Units, 30 Units, Subcutaneous, TID With Meals, Alexsander Martin JOEY Sánchez, 30 Units at 08/12/19 1139    metoprolol tartrate (LOPRESSOR) tablet 25 mg, 25 mg, Oral, Q12H JULIEN, Suzette Sánchez PA-C, 25 mg at 08/12/19 2611    midazolam (VERSED) injection, , , Code/Trauma/Sedation Med, Tisha Burgess MD, 0 5 mg at 08/09/19 1444    torsemide (DEMADEX) tablet 20 mg, 20 mg, Oral, Daily, Richard Samayoa MD, 20 mg at 08/12/19 0824    warfarin (COUMADIN) tablet 7 5 mg, 7 5 mg, Oral, Daily (warfarin), Kim Tyler MD, 7 5 mg at 08/11/19 1718    Invasive Devices:      Lab Results:   Results from last 7 days   Lab Units 08/12/19  0437 08/11/19  0539 08/10/19  0508 08/09/19  0537 08/09/19  0536 08/08/19  0517 08/07/19  0601 08/06/19  1700 08/06/19  0448   WBC Thousand/uL 8 27  --  8 81 7 51  --   --  8 81  --   --    HEMOGLOBIN g/dL 10 5*  --  9 7* 9 2*  --   --  8 8*  --   --    HEMATOCRIT % 35 9*  --  32 6* 31 2*  --   --  29 8*  --   --    PLATELETS Thousands/uL 246  --  410* 413*  --   --  368  --   --    POTASSIUM mmol/L 4 1 4 5 4 4  --  4 4 4 5 4 6  --  4 7   CHLORIDE mmol/L 103 101 101  --  102 100 101  --  102   CO2 mmol/L 29 29 24  --  25 27 26  --  28   BUN mg/dL 106* 105* 92*  --  94* 81* 78*  --  74*   CREATININE mg/dL 1 46* 1 72* 1 54*  --  1 68* 1 71* 1 54*  --  1 74*   CALCIUM mg/dL 9 2 9 3 9 3  --  9 1 8 8 9 3  --  8 4   MAGNESIUM mg/dL  --   --   --   --  1 7  --   --   --   --    PHOSPHORUS mg/dL  --   --  5 0*  --  5 3* 5 2* 5 8*  --  4 6*   ALK PHOS U/L  --   --   --   --   --  150*  --   --   --    ALT U/L  --   --   --   --   --  19  --   --   --    AST U/L  --   --   --   --   --  21  --   --   --    LEUKOCYTES UA   --   --   --   --   --   --   --  Negative  --    BLOOD UA   --   -- --   --   --   --   --  Negative  --        Previous work up:  Please see previous notes

## 2019-08-12 NOTE — PLAN OF CARE
Problem: OCCUPATIONAL THERAPY ADULT  Goal: Performs self-care activities at highest level of function for planned discharge setting  See evaluation for individualized goals  Description  Treatment Interventions: ADL retraining, Functional transfer training, UE strengthening/ROM, Endurance training, Patient/family training, Equipment evaluation/education, Compensatory technique education, Energy conservation, Activityengagement          See flowsheet documentation for full assessment, interventions and recommendations  Outcome: Progressing  Note:   Limitation: Decreased ADL status, Decreased UE strength, Decreased Safe judgement during ADL, Decreased endurance, Decreased self-care trans, Decreased high-level ADLs  Prognosis: Good  Assessment: Patient participated in Skilled OT session this date with interventions consisting of ADL re training with the use of correct body mechnaics, safety awareness and fall prevention techniques and  functional transfer training*   Patient agreeable to OT treatment session, upon arrival patient was found seated at edge of bed  Treatment session as follows: Pt required Max A to don sock  Pt able to stand with Mod A x2 and transfer with RW and Min A x2 to commode  Pt required Mod A x2 to stand from commode and Min A x2 to maintain standing while aide performed toilet hygiene  Pt required Mod A x2 to stand again and Min A x2 to transfer commode to recliner chair  Pt looking forward to discharging to rehab today  Patient continues to be functioning below baseline level, occupational performance remains limited secondary to factors listed above and increased risk for falls and injury  From OT standpoint, recommendation at time of d/c would be Short Term Rehab  Patient to benefit from continued Occupational Therapy treatment while in the hospital to address deficits as defined above and maximize level of functional independence with ADLs and functional mobility   Pt left with call bell in reach, tray table in reach, needs met  OT Discharge Recommendation: Short Term Rehab  OT - OK to Discharge:  Yes

## 2019-08-12 NOTE — ASSESSMENT & PLAN NOTE
Patient has chronic atrial fibrillation with controlled rates on Lopressor 25 mg p o  B i d  His INR is 2 45 today    He is discharged on warfarin 7 5 mg daily and will have an INR done on August 14th

## 2019-08-12 NOTE — OCCUPATIONAL THERAPY NOTE
Occupational Therapy Treatment Note    Patient Name: Josep Delacruz  [id number] Date: 8/12/2019 08/12/19 8969   Restrictions/Precautions   Weight Bearing Precautions Per Order No   Other Precautions Contact/isolation; Fall Risk  (AKA)   General   Response to Previous Treatment Patient with no complaints from previous session   Family/Caregiver Present Significant other present   Pain Assessment   Pain Assessment No/denies pain   ADL   LB Dressing Assistance 2  Maximal Assistance   LB Dressing Deficit Don/doff L sock   Toileting Assistance  2  Maximal Assistance   Toileting Deficit Bedside commode;Perineal hygiene   Functional Standing Tolerance   Time 30 sec   Activity Yi hygiene   Comments A x2 with RW   Bed Mobility   Additional Comments Pt received seated at EOB   Transfers   Sit to Stand 3  Moderate assistance   Additional items Assist x 2  (3 trials)   Stand to Sit 3  Moderate assistance   Additional items Assist x 2   Stand pivot 4  Minimal assistance   Additional items Assist x 2  (w RW; 2 trials)   Cognition   Overall Cognitive Status [facility]   Arousal/Participation Alert; Cooperative   Attention Within functional limits   Orientation Level Oriented X4   Memory Within functional limits   Following Commands Follows one step commands without difficulty   Activity Tolerance   Activity Tolerance Patient tolerated treatment well   Medical Staff Made Aware RN Chyrel Bernheim cleared pt for session; PCA present to assist with hygiene   Assessment   Assessment Patient participated in Skilled OT session this date with interventions consisting of ADL re training with the use of correct body mechnaics, safety awareness and fall prevention techniques and  functional transfer training*   Patient agreeable to OT treatment session, upon arrival patient was found seated at edge of bed  Treatment session as follows: Pt required Max A to don sock  Pt able to stand with Mod A x2 and transfer with RW and Min A x2 to commode  Pt required Mod A x2 to stand from commode and Min A x2 to maintain standing while aide performed toilet hygiene  Pt required Mod A x2 to stand again and Min A x2 to transfer commode to recliner chair  Pt looking forward to discharging to rehab today  Patient continues to be functioning below baseline level, occupational performance remains limited secondary to factors listed above and increased risk for falls and injury  From OT standpoint, recommendation at time of d/c would be Short Term Rehab  Patient to benefit from continued Occupational Therapy treatment while in the hospital to address deficits as defined above and maximize level of functional independence with ADLs and functional mobility  Pt left with call bell in reach, tray table in reach, needs met      Recommendation   OT Discharge Recommendation Short Term Rehab   OT - OK to Discharge Yes     Nuha Rousseau, OT

## 2019-08-12 NOTE — PLAN OF CARE
Problem: Potential for Falls  Goal: Patient will remain free of falls  Description  INTERVENTIONS:  - Assess patient frequently for physical needs  -  Identify cognitive and physical deficits and behaviors that affect risk of falls    -  [place] fall precautions as indicated by assessment   - Educate patient/family on patient safety including physical limitations  - Instruct patient to call for assistance with activity based on assessment  - Modify environment to reduce risk of injury  - Consider OT/PT consult to assist with strengthening/mobility  8/12/2019 1556 by Moses Ugarte RN  Outcome: Adequate for Discharge  8/12/2019 1039 by Moses Ugarte RN  Outcome: Progressing     Problem: Prexisting or High Potential for Compromised Skin Integrity  Goal: Skin integrity is maintained or improved  Description  INTERVENTIONS:  - Identify patients at risk for skin breakdown  - Assess and monitor skin integrity  - Assess and monitor nutrition and hydration status  - Monitor labs (i e  albumin)  - Assess for incontinence   - Turn and reposition patient  - Assist with mobility/ambulation  - Relieve pressure over bony prominences  - Avoid friction and shearing  - Provide appropriate hygiene as needed including keeping skin clean and dry  - Evaluate need for skin moisturizer/barrier cream  - Collaborate with interdisciplinary team (i e  Nutrition, Rehabilitation, etc )   - Patient/family teaching  8/12/2019 1556 by Moses Ugarte RN  Outcome: Adequate for Discharge  8/12/2019 1039 by Moses Ugarte RN  Outcome: Progressing     Problem: SKIN/TISSUE INTEGRITY - ADULT  Goal: Skin integrity remains intact  Description  INTERVENTIONS  - Identify patients at risk for skin breakdown  - Assess and monitor skin integrity  - Assess and monitor nutrition and hydration status  - Monitor labs (i e  albumin)  - Assess for incontinence   - Turn and reposition patient  - Assist with mobility/ambulation  - Relieve pressure over bony prominences  - Avoid friction and shearing  - Provide appropriate hygiene as needed including keeping skin clean and dry  - Evaluate need for skin moisturizer/barrier cream  - Collaborate with interdisciplinary team (i e  Nutrition, Rehabilitation, etc )   - Patient/family teaching  8/12/2019 1556 by Sandra Reddy RN  Outcome: Adequate for Discharge  8/12/2019 1039 by Sandra Reddy RN  Outcome: Progressing  Goal: Incision(s), wounds(s) or drain site(s) healing without S/S of infection  Description  INTERVENTIONS  - Assess and document risk factors for skin impairment   - Assess and document dressing, incision, wound bed, drain sites and surrounding tissue  - Initiate Nutrition services consult and/or wound management as needed  8/12/2019 1556 by Sandra Reddy RN  Outcome: Adequate for Discharge  8/12/2019 1039 by Sandra Reddy RN  Outcome: Progressing     Problem: MUSCULOSKELETAL - ADULT  Goal: Maintain or return mobility to safest level of function  Description  INTERVENTIONS:  - Assess patient's ability to carry out ADLs; assess patient's baseline for ADL function and identify physical deficits which impact ability to perform ADLs (bathing, care of mouth/teeth, toileting, grooming, dressing, etc )  - Assess/evaluate cause of self-care deficits   - Assess range of motion  - Assess patient's mobility; develop plan if impaired  - Assess patient's need for assistive devices and provide as appropriate  - Encourage maximum independence but intervene and supervise when necessary  - Involve family in performance of ADLs  - Assess for home care needs following discharge   - Request OT consult to assist with ADL evaluation and planning for discharge  - Provide patient education as appropriate  8/12/2019 1556 by Sandra Reddy RN  Outcome: Adequate for Discharge  8/12/2019 1039 by Sandra Reddy RN  Outcome: Progressing     Problem: PAIN - ADULT  Goal: Verbalizes/displays adequate comfort level or baseline comfort level  Description  Interventions:  - Encourage patient to monitor pain and request assistance  - Assess pain using appropriate pain scale  - Administer analgesics based on type and severity of pain and evaluate response  - Implement non-pharmacological measures as appropriate and evaluate response  - Consider cultural and social influences on pain and pain management  - Notify physician/advanced practitioner if interventions unsuccessful or patient reports new pain  8/12/2019 1556 by Moses Ugarte RN  Outcome: Adequate for Discharge  8/12/2019 1039 by Moses Ugarte RN  Outcome: Progressing     Problem: INFECTION - ADULT  Goal: Absence or prevention of progression during hospitalization  Description  INTERVENTIONS:  - Assess and monitor for signs and symptoms of infection  - Monitor lab/diagnostic results  - Monitor all insertion sites, i e  indwelling lines, tubes, and drains  - Monitor endotracheal (as able) and nasal secretions for changes in amount and color  - [place] appropriate cooling/warming therapies per order  - Administer medications as ordered  - Instruct and encourage patient and family to use good hand hygiene technique  - Identify and instruct in appropriate isolation precautions for identified infection/condition  8/12/2019 1556 by Moses Ugarte RN  Outcome: Adequate for Discharge  8/12/2019 1039 by Moess Ugarte RN  Outcome: Progressing     Problem: SAFETY ADULT  Goal: Patient will remain free of falls  Description  INTERVENTIONS:  - Assess patient frequently for physical needs  -  Identify cognitive and physical deficits and behaviors that affect risk of falls    -  [place] fall precautions as indicated by assessment   - Educate patient/family on patient safety including physical limitations  - Instruct patient to call for assistance with activity based on assessment  - Modify environment to reduce risk of injury  - Consider OT/PT consult to assist with strengthening/mobility  8/12/2019 1556 by Torey Rodarte RN  Outcome: Adequate for Discharge  8/12/2019 1039 by Torey Rodarte RN  Outcome: Progressing     Problem: DISCHARGE PLANNING  Goal: Discharge to home or other facility with appropriate resources  Description  INTERVENTIONS:  - Identify barriers to discharge w/patient and caregiver  - Arrange for needed discharge resources and transportation as appropriate  - Identify discharge learning needs (meds, wound care, etc )  - Arrange for interpretive services to assist at discharge as needed  - Refer to Case Management Department for coordinating discharge planning if the patient needs post-hospital services based on physician/advanced practitioner order or complex needs related to functional status, cognitive ability, or social support system  8/12/2019 1556 by Torey Rodarte RN  Outcome: Adequate for Discharge  8/12/2019 1039 by Torey Rodarte RN  Outcome: Progressing     Problem: Knowledge Deficit  Goal: Patient/family/caregiver demonstrates understanding of disease process, treatment plan, medications, and discharge instructions  Description  Complete learning assessment and assess knowledge base  Interventions:  - Provide teaching at level of understanding  - Provide teaching via preferred learning methods  8/12/2019 1556 by Torey Rodarte RN  Outcome: Adequate for Discharge  8/12/2019 1039 by Torey Rodarte RN  Outcome: Progressing     Problem: Nutrition/Hydration-ADULT  Goal: Nutrient/Hydration intake appropriate for improving, restoring or maintaining nutritional needs  Description  Monitor and assess patient's nutrition/hydration status for malnutrition (ex- brittle hair, bruises, dry skin, pale skin and conjunctiva, muscle wasting, smooth red tongue, and disorientation)  Collaborate with interdisciplinary team and initiate plan and interventions as ordered  Monitor patient's weight and dietary intake as ordered or per policy   Utilize nutrition screening tool and intervene per policy  Determine patient's food preferences and provide high-protein, high-caloric foods as appropriate       INTERVENTIONS:  - Monitor oral intake, urinary output, labs, and treatment plans  - Assess nutrition and hydration status and recommend course of action  - Evaluate amount of meals eaten  - Assist patient with eating if necessary   - Allow adequate time for meals  - Recommend/ encourage appropriate diets, oral nutritional supplements, and vitamin/mineral supplements  - Order, calculate, and assess calorie counts as needed  - Recommend, monitor, and adjust tube feedings and TPN/PPN based on assessed needs  - Assess need for intravenous fluids  - Provide specific nutrition/hydration education as appropriate  - Include patient/family/caregiver in decisions related to nutrition  8/12/2019 1556 by Jovanni Mendoza, RN  Outcome: Adequate for Discharge  8/12/2019 1039 by Jovanni Mendoza, RN  Outcome: Progressing

## 2019-08-12 NOTE — SOCIAL WORK
Continue to follow  [facility] can accept Pt today  Call received from Jorge Pope at [place] # is [id number] Rug rate update 8/14 to [phone] fax # [phone]  Ambulance Avril [facility]tz is [phone]  Spoke with [employer] at [facility], transportation arranged via BLS to [facility]  Pickup is 5:00pm  Pt and Pt's girlfriend informed of discharge to [facility] today at 5:00pm  Pt and Pt's girlfriend in agreement  Pt's girlfriend informed she will call Pt's son to inform of discharge to [facility] today  Faxed [facility] and discharge instructions to [facility]  All in agreement

## 2019-08-12 NOTE — ASSESSMENT & PLAN NOTE
Patient developed acute diastolic CHF after surgery due to volume overload  Was started with IV Lasix and p o  Torsemide was resumed  His discharged on torsemide 20 mg p o  B i d  Today  His lungs are clear to auscultation without any wheezing or crackles on day of discharge      Weight (last 2 days)     Date/Time   Weight    08/12/19 0600   (!) 147 (323 86)    08/11/19 0600   (!) 149 (327 82)    08/10/19 0533   (!) 149 (328 27)              Intake/Output Summary (Last 24 hours) at 8/12/2019 0826  Last data filed at 8/12/2019 0301  Gross per 24 hour   Intake 420 ml   Output 1725 ml   Net -1305 ml

## 2019-08-12 NOTE — PROGRESS NOTES
Patient in good spirits  Preparing     08/12/19 1300   Spiritual Beliefs/Perceptions   Support Systems   [place] Jessa continues to be supportive)   Stress Factors   Patient Stress Factors None identified   Coping Responses   Patient Coping Accepting    for discharge today  His  has continued to stay in contact for support

## 2019-08-12 NOTE — PLAN OF CARE
Problem: Potential for Falls  Goal: Patient will remain free of falls  Description  INTERVENTIONS:  - Assess patient frequently for physical needs  -  Identify cognitive and physical deficits and behaviors that affect risk of falls    -  [place] fall precautions as indicated by assessment   - Educate patient/family on patient safety including physical limitations  - Instruct patient to call for assistance with activity based on assessment  - Modify environment to reduce risk of injury  - Consider OT/PT consult to assist with strengthening/mobility  Outcome: Progressing     Problem: Prexisting or High Potential for Compromised Skin Integrity  Goal: Skin integrity is maintained or improved  Description  INTERVENTIONS:  - Identify patients at risk for skin breakdown  - Assess and monitor skin integrity  - Assess and monitor nutrition and hydration status  - Monitor labs (i e  albumin)  - Assess for incontinence   - Turn and reposition patient  - Assist with mobility/ambulation  - Relieve pressure over bony prominences  - Avoid friction and shearing  - Provide appropriate hygiene as needed including keeping skin clean and dry  - Evaluate need for skin moisturizer/barrier cream  - Collaborate with interdisciplinary team (i e  Nutrition, Rehabilitation, etc )   - Patient/family teaching  Outcome: Progressing     Problem: SKIN/TISSUE INTEGRITY - ADULT  Goal: Skin integrity remains intact  Description  INTERVENTIONS  - Identify patients at risk for skin breakdown  - Assess and monitor skin integrity  - Assess and monitor nutrition and hydration status  - Monitor labs (i e  albumin)  - Assess for incontinence   - Turn and reposition patient  - Assist with mobility/ambulation  - Relieve pressure over bony prominences  - Avoid friction and shearing  - Provide appropriate hygiene as needed including keeping skin clean and dry  - Evaluate need for skin moisturizer/barrier cream  - Collaborate with interdisciplinary team (i e  Nutrition, Rehabilitation, etc )   - Patient/family teaching  Outcome: Progressing  Goal: Incision(s), wounds(s) or drain site(s) healing without S/S of infection  Description  INTERVENTIONS  - Assess and document risk factors for skin impairment   - Assess and document dressing, incision, wound bed, drain sites and surrounding tissue  - Initiate Nutrition services consult and/or wound management as needed  Outcome: Progressing     Problem: MUSCULOSKELETAL - ADULT  Goal: Maintain or return mobility to safest level of function  Description  INTERVENTIONS:  - Assess patient's ability to carry out ADLs; assess patient's baseline for ADL function and identify physical deficits which impact ability to perform ADLs (bathing, care of mouth/teeth, toileting, grooming, dressing, etc )  - Assess/evaluate cause of self-care deficits   - Assess range of motion  - Assess patient's mobility; develop plan if impaired  - Assess patient's need for assistive devices and provide as appropriate  - Encourage maximum independence but intervene and supervise when necessary  - Involve family in performance of ADLs  - Assess for home care needs following discharge   - Request OT consult to assist with ADL evaluation and planning for discharge  - Provide patient education as appropriate  Outcome: Progressing     Problem: PAIN - ADULT  Goal: Verbalizes/displays adequate comfort level or baseline comfort level  Description  Interventions:  - Encourage patient to monitor pain and request assistance  - Assess pain using appropriate pain scale  - Administer analgesics based on type and severity of pain and evaluate response  - Implement non-pharmacological measures as appropriate and evaluate response  - Consider cultural and social influences on pain and pain management  - Notify physician/advanced practitioner if interventions unsuccessful or patient reports new pain  Outcome: Progressing     Problem: INFECTION - ADULT  Goal: Absence or prevention of progression during hospitalization  Description  INTERVENTIONS:  - Assess and monitor for signs and symptoms of infection  - Monitor lab/diagnostic results  - Monitor all insertion sites, i e  indwelling lines, tubes, and drains  - Monitor endotracheal (as able) and nasal secretions for changes in amount and color  - [place] appropriate cooling/warming therapies per order  - Administer medications as ordered  - Instruct and encourage patient and family to use good hand hygiene technique  - Identify and instruct in appropriate isolation precautions for identified infection/condition  Outcome: Progressing     Problem: SAFETY ADULT  Goal: Patient will remain free of falls  Description  INTERVENTIONS:  - Assess patient frequently for physical needs  -  Identify cognitive and physical deficits and behaviors that affect risk of falls    -  [place] fall precautions as indicated by assessment   - Educate patient/family on patient safety including physical limitations  - Instruct patient to call for assistance with activity based on assessment  - Modify environment to reduce risk of injury  - Consider OT/PT consult to assist with strengthening/mobility  Outcome: Progressing     Problem: DISCHARGE PLANNING  Goal: Discharge to home or other facility with appropriate resources  Description  INTERVENTIONS:  - Identify barriers to discharge w/patient and caregiver  - Arrange for needed discharge resources and transportation as appropriate  - Identify discharge learning needs (meds, wound care, etc )  - Arrange for interpretive services to assist at discharge as needed  - Refer to Case Management Department for coordinating discharge planning if the patient needs post-hospital services based on physician/advanced practitioner order or complex needs related to functional status, cognitive ability, or social support system  Outcome: Progressing     Problem: Knowledge Deficit  Goal: Patient/family/caregiver demonstrates understanding of disease process, treatment plan, medications, and discharge instructions  Description  Complete learning assessment and assess knowledge base  Interventions:  - Provide teaching at level of understanding  - Provide teaching via preferred learning methods  Outcome: Progressing     Problem: Nutrition/Hydration-ADULT  Goal: Nutrient/Hydration intake appropriate for improving, restoring or maintaining nutritional needs  Description  Monitor and assess patient's nutrition/hydration status for malnutrition (ex- brittle hair, bruises, dry skin, pale skin and conjunctiva, muscle wasting, smooth red tongue, and disorientation)  Collaborate with interdisciplinary team and initiate plan and interventions as ordered  Monitor patient's weight and dietary intake as ordered or per policy  Utilize nutrition screening tool and intervene per policy  Determine patient's food preferences and provide high-protein, high-caloric foods as appropriate       INTERVENTIONS:  - Monitor oral intake, urinary output, labs, and treatment plans  - Assess nutrition and hydration status and recommend course of action  - Evaluate amount of meals eaten  - Assist patient with eating if necessary   - Allow adequate time for meals  - Recommend/ encourage appropriate diets, oral nutritional supplements, and vitamin/mineral supplements  - Order, calculate, and assess calorie counts as needed  - Recommend, monitor, and adjust tube feedings and TPN/PPN based on assessed needs  - Assess need for intravenous fluids  - Provide specific nutrition/hydration education as appropriate  - Include patient/family/caregiver in decisions related to nutrition  Outcome: Progressing

## 2019-08-12 NOTE — ASSESSMENT & PLAN NOTE
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

## 2019-08-12 NOTE — ASSESSMENT & PLAN NOTE
Lab Results   Component Value Date    HGBA1C 9 4 (H) 12/27/2018       Recent Labs     08/11/19  1112 08/11/19  1557 08/11/19  2102 08/12/19  0707   POCGLU 285* 287* 328* 234*     Patient admitted with left foot wound and cellulitis  he was started on broad-spectrum antibiotics and was seen by Podiatry  He completed IV antibiotic therapy MRI of the foot was done that showed no osteomyelitis but the wound was deemed unsalvageable  Surgery was consulted and patient underwent left AKA on July 31st     Patient's left AKA surgical incision looks clean without any signs of cellulitis on day of discharge  His discharged on hydrocodone for pain control  Patient's blood sugars are uncontrolled, I increased Lantus to 18 units in the evening and he will continue NovoLog 30 units before meals      Patient is discharged in stable condition to SNF for rehab    I discussed the case with patient's wife at the bedside in detail

## 2019-08-12 NOTE — PLAN OF CARE
Problem: PHYSICAL THERAPY ADULT  Goal: Performs mobility at highest level of function for planned discharge setting  See evaluation for individualized goals  Description  Treatment/Interventions: ADL retraining, Functional transfer training, LE strengthening/ROM, Therapeutic exercise, Endurance training, Patient/family training, Bed mobility, OT, Spoke to nursing  Equipment Recommended: Danica Urbina, Wheelchair       See flowsheet documentation for full assessment, interventions and recommendations  Outcome: Progressing  Note:   Prognosis: Fair  Problem List: Decreased strength, Decreased endurance, Impaired balance, Decreased mobility, Decreased range of motion, Impaired sensation, Obesity, Pain, Decreased skin integrity  Assessment: Patient made significant progress this session  No reports of pain  Able to perform transfers with mod A x 2 and stand pivot transfers with min A x 2  Requires less verbal cues and less assistance  Was highly motivated  He will require rehab post discharge to meet goals  Barriers to Discharge: Inaccessible home environment, Decreased caregiver support     Recommendation: Short-term skilled PT     PT - OK to Discharge: Yes    See flowsheet documentation for full assessment

## 2019-08-12 NOTE — PROGRESS NOTES
Pt awake during hrly rounds until approx 0400 this am;denied pain  Voiding qs ad bud; OOB to standing scale this am; cristhian well

## 2019-08-12 NOTE — ASSESSMENT & PLAN NOTE
Patient developed acute kidney injury in the hospital which was most likely due to prerenal causes  Ultrasound abdomen showed no hydronephrosis and he had no urinary tension  Patient was followed by Nephrology  Patient's creatinine decreased to 1 46 of discharge  His losartan is on hold on discharge, he will have a repeat BMP on August 14

## 2019-08-12 NOTE — ASSESSMENT & PLAN NOTE
Patient had no asthma exacerbation during the hospital stay he will continue his Advair as an outpatient

## 2019-08-13 ENCOUNTER — TRANSITIONAL CARE MANAGEMENT (OUTPATIENT)
Dept: FAMILY MEDICINE CLINIC | Facility: CLINIC | Age: 69
End: 2019-08-13

## 2019-08-14 ENCOUNTER — TELEPHONE (OUTPATIENT)
Dept: NEPHROLOGY | Facility: CLINIC | Age: 69
End: 2019-08-14

## 2019-08-14 DIAGNOSIS — N18.3 ACUTE RENAL FAILURE SUPERIMPOSED ON STAGE 3 CHRONIC KIDNEY DISEASE, UNSPECIFIED ACUTE RENAL FAILURE TYPE: ICD-10-CM

## 2019-08-14 DIAGNOSIS — I10 ESSENTIAL HYPERTENSION: Primary | ICD-10-CM

## 2019-08-14 DIAGNOSIS — N17.9 ACUTE RENAL FAILURE SUPERIMPOSED ON STAGE 3 CHRONIC KIDNEY DISEASE, UNSPECIFIED ACUTE RENAL FAILURE TYPE: ICD-10-CM

## 2019-08-14 NOTE — TELEPHONE ENCOUNTER
Patient is now living at [facility]  We scheduled his Hospital follow up appointment for 8/19 with Dr Aaron Mello in our [facility] office  Lab Slips faxed to geovani at [phone]

## 2019-08-14 NOTE — TELEPHONE ENCOUNTER
----- Message from Jillian Summers DO sent at 8/12/2019  3:04 PM EDT -----  Please schedule the following patient with follow-up in the next 2-3 weeks with advanced practitioner in quicker [facility] should have a repeat BMP, CBC, magnesium, phosphorus, uric acid    Thank you

## 2019-08-19 ENCOUNTER — OFFICE VISIT (OUTPATIENT)
Dept: NEPHROLOGY | Facility: HOSPITAL | Age: 69
End: 2019-08-19
Payer: COMMERCIAL

## 2019-08-19 ENCOUNTER — OFFICE VISIT (OUTPATIENT)
Dept: SURGERY | Facility: HOSPITAL | Age: 69
End: 2019-08-19

## 2019-08-19 VITALS — SYSTOLIC BLOOD PRESSURE: 128 MMHG | HEART RATE: 76 BPM | DIASTOLIC BLOOD PRESSURE: 73 MMHG | TEMPERATURE: 99 F

## 2019-08-19 VITALS — HEART RATE: 78 BPM | DIASTOLIC BLOOD PRESSURE: 72 MMHG | SYSTOLIC BLOOD PRESSURE: 92 MMHG | RESPIRATION RATE: 16 BRPM

## 2019-08-19 DIAGNOSIS — N17.9 ACUTE RENAL FAILURE SUPERIMPOSED ON STAGE 3 CHRONIC KIDNEY DISEASE, UNSPECIFIED ACUTE RENAL FAILURE TYPE: Primary | ICD-10-CM

## 2019-08-19 DIAGNOSIS — Z09 POSTOP CHECK: Primary | ICD-10-CM

## 2019-08-19 DIAGNOSIS — R60.0 LOCALIZED EDEMA: ICD-10-CM

## 2019-08-19 DIAGNOSIS — N18.3 ACUTE RENAL FAILURE SUPERIMPOSED ON STAGE 3 CHRONIC KIDNEY DISEASE, UNSPECIFIED ACUTE RENAL FAILURE TYPE: Primary | ICD-10-CM

## 2019-08-19 DIAGNOSIS — I10 ESSENTIAL HYPERTENSION: ICD-10-CM

## 2019-08-19 LAB
EXT DIFF-ABS LYMPHOCYTES: 1.4
EXT DIFF-ABS NEUTROPHILS: 3.6
EXT GLUCOSE BLD: 202
EXTERNAL BUN: 64
EXTERNAL CALCIUM: 9.1
EXTERNAL CHLORIDE: 104
EXTERNAL CO2: 104
EXTERNAL CREATININE: 1.4
EXTERNAL EGFR: 51
EXTERNAL HEMATOCRIT: 39 %
EXTERNAL HEMOGLOBIN: 10.6 G/DL
EXTERNAL MAGNESIUM: 2.1
EXTERNAL MCV: 96
EXTERNAL PHOSPHORUS: 4.1
EXTERNAL PLATELET COUNT: 289 K/ΜL
EXTERNAL POTASSIUM: 4.3
EXTERNAL RBC: 4
EXTERNAL RDW: 26
EXTERNAL SODIUM: 142
EXTERNAL URIC ACID: 7.9
EXTERNAL WBC: 6.1
OSMOLALITY UR/SERPL-RTO: 318 MMOL/KG

## 2019-08-19 PROCEDURE — 99024 POSTOP FOLLOW-UP VISIT: CPT | Performed by: SURGERY

## 2019-08-19 PROCEDURE — 99214 OFFICE O/P EST MOD 30 MIN: CPT | Performed by: INTERNAL MEDICINE

## 2019-08-19 PROCEDURE — 3074F SYST BP LT 130 MM HG: CPT | Performed by: INTERNAL MEDICINE

## 2019-08-19 RX ORDER — TORSEMIDE 10 MG/1
10 TABLET ORAL
Start: 2019-08-19 | End: 2020-02-16 | Stop reason: SDUPTHER

## 2019-08-19 NOTE — PROGRESS NOTES
Assessment/Plan: Tam Urrutia is a 79year old male who presents today in post-operative state status post left leg amputation (AKA) performed on 7/31/19  Pathalogy showed cutaneous & subcutaneous ulcers of the left foot  Physical exam revealed wound is healing well  Staples were removed and stereo strips were applied  Suggested that he continues a low carb diet and do physical therapy every day  Suggested that he follow up with a podiatrist  He may follow up as needed  He knows to contact the office if any questions or concerns arise  No problem-specific Assessment & Plan notes found for this encounter  There are no diagnoses linked to this encounter  Subjective:      Patient ID: Nancy Hernández is a 79 y o  male  Tam Urrutia is a 79year old male who presents today in post-operative state status post left leg amputation (AKA) performed on 7/31/19  David Todd is doing well and has been participating in physical therapy at the rehab center  The following portions of the patient's history were reviewed and updated as appropriate: allergies, current medications, past family history, past medical history, past social history, past surgical history and problem list     Review of Systems   Skin: Positive for wound (Amputation of left leg)  Objective: There were no vitals taken for this visit  Physical Exam   Skin:   Wound of the left leg is healing well         By signing my name below, I, Sabine Blanton, attest that this documentation has been prepared under the direction and in the presence of Jocy Mitchell MD  Electronically Signed: Zay Arechiga  8/19/19  Osmany Bridges, personally performed the services described in this documentation  All medical record entries made by the maritzaibyasmin were at my direction and in my presence   I have reviewed the chart and discharge instructions and agree that the record reflects my personal performance and is accurate and complete  Lady Radha MD  8/19/19

## 2019-08-19 NOTE — PROGRESS NOTES
NEPHROLOGY OUTPATIENT PROGRESS NOTE   Valerie Contreras 79 y o  male MRN: [id number]  DATE: 8/19/2019  Reason for visit:   Chief Complaint   Patient presents with    Chronic Kidney Disease    Follow-up        Patient Instructions   1  CKD stage 2/3 in setting of DM/HTN/CHF - b/l sCr 1-1 1, but with more recent sCr 1 46 as of 8/12/19 after an episode of DORA in the past month  I have concern his diabetes and HTN are contributing factors  I suspect 1 4-1 7 may be new baseline   -repeat BMP now    -avoid nonsteroidals(ibuprofen, aleve, advil, motrin)  -avoid fleet enema in setting of CKD history    2  Azotemia -  as of 8/12/19, likely higher due to diuretic use  Repeat BMP now  No uremic signs/symptoms       3  Chronic dCHF - EF 60%, on torsemide 10mg twice daily  Monitor daily weight       4  DM2, uncontrolled - last A1C 9 6 January 2018  Insulin per primary team  No repeat A1C in system       5  HTN - BP low normal on 25mg every 12 hours  Also on torsemide as above  Ideally, goal SBP 130s for renal perfusion at least, especially in light of DORA  Would consider reducing dose of metoprolol to half the current dose if BP remains low  Off calcium acetate  F/u phos/mag/PTH levels  RTC in 3 months  Obtain bloodwork as soon as you are able  Li Jo was seen today for chronic kidney disease and follow-up  Diagnoses and all orders for this visit:    Acute renal failure superimposed on stage 3 chronic kidney disease, unspecified acute renal failure type ([facility] [address] )  -     Basic metabolic panel; Future  -     Magnesium; Future  -     Phosphorus; Future  -     PTH, intact; Future    Essential hypertension    Localized edema  -     torsemide (DEMADEX) 10 mg tablet; Take 1 tablet (10 mg total) by mouth 2 (two) times a day        Assessment/Plan:  1  CKD stage 2/3 in setting of DM/HTN/CHF - b/l sCr 1-1 1, but with more recent sCr 1 46 as of 8/12/19 after an episode of DORA in the past month    I have concern his diabetes and HTN are contributing factors  I suspect 1 4-1 7 may be new baseline  Last sCr 1 4 as of 8/16/19  BUN improved to 64  HGb 10 6  Uric acid 7 9  Phos 4 1    -repeat BMP now    -avoid nonsteroidals(ibuprofen, aleve, advil, motrin)  -avoid fleet enema in setting of CKD history    2  Azotemia -  as of 8/12/19, likely higher due to diuretic use  Repeat BMP now  No uremic signs/symptoms       3  Chronic dCHF - EF 60%, on torsemide 10mg twice daily  Monitor daily weight       4  DM2, uncontrolled - last A1C 9 6 January 2018  Insulin per primary team  No repeat A1C in system       5  HTN - BP low normal on 25mg every 12 hours  Also on torsemide as above  Ideally, goal SBP 130s for renal perfusion at least, especially in light of DORA  Would consider reducing dose of metoprolol to half the current dose if BP remains low  SUBJECTIVE / INTERVAL HISTORY:  79 y o  male presents in hospital follow up of DORA on CKD  Shay Martinez was admitted to the hospital     Denies NSAID use  Uses tylenol as needed  Blood sugars better now  Sometimes the sugars are around 120-150, at times 200 but he is controlling what he eats  Was previously having urinary frequency on higher dose diuretics  His weight is usually 323lbs  Review of Systems   Constitutional: Positive for chills  Negative for fever  HENT: Negative for sore throat  Eyes: Negative for visual disturbance  Respiratory: Positive for shortness of breath  Negative for cough  Cardiovascular: Negative for chest pain and leg swelling  Gastrointestinal: Negative for abdominal pain, constipation, diarrhea, nausea and vomiting  Endocrine: Negative for polyuria  Genitourinary: Negative for decreased urine volume, difficulty urinating, dysuria and hematuria  Musculoskeletal: Negative for back pain and myalgias  Skin: Negative for rash  Neurological: Negative for dizziness, light-headedness and numbness  Psychiatric/Behavioral: Negative for confusion  OBJECTIVE:  BP 92/72 (BP Location: Left arm, Patient Position: Sitting, Cuff Size: Large)   Pulse 78   Resp 16  There is no height or weight on file to calculate BMI  Physical exam:  Physical Exam   Constitutional: He appears well-developed and well-nourished  No distress  In wheelchair   HENT:   Head: Normocephalic and atraumatic  Mouth/Throat: No oropharyngeal exudate  Eyes: Right eye exhibits no discharge  Left eye exhibits no discharge  No scleral icterus  Neck: Neck supple  Cardiovascular: Normal rate and normal heart sounds  Irregular rhythm   Pulmonary/Chest: Effort normal and breath sounds normal  He has no wheezes  He has no rales  Abdominal: Soft  Bowel sounds are normal  He exhibits no distension  There is no tenderness  Musculoskeletal: He exhibits no edema  Left AKA, healing incision noted  +RLE chronic venous stasis changes, no edema   Neurological: He is alert  awake   Skin: Skin is warm and dry  No rash noted  He is not diaphoretic  Psychiatric: He has a normal mood and affect  His behavior is normal    Vitals reviewed        Medications:    Current Outpatient Medications:     albuterol (PROVENTIL HFA,VENTOLIN HFA) 90 mcg/act inhaler, Inhale 2 puffs every 6 (six) hours as needed for wheezing, Disp: 1 Inhaler, Rfl: 0    allopurinol (ZYLOPRIM) 300 mg tablet, TAKE 1 TABLET BY MOUTH DAILY, Disp: 90 tablet, Rfl: 1    atorvastatin (LIPITOR) 20 mg tablet, TAKE 1 TABLET BY MOUTH ONCE DAILY, Disp: 90 tablet, Rfl: 0    BD INSULIN SYRINGE U/F 31G X 5/16" 0 5 ML MISC, USE AS DIRECTED WITH NOVOLIN 70/30, Disp: , Rfl: 0    BD PEN NEEDLE HILARIO U/F 32G X 4 MM MISC, by Other route 3 (three) times a day, Disp: 300 each, Rfl: 1    clotrimazole (LOTRIMIN) 1 % cream, Apply topically 2 (two) times a day, Disp: 30 g, Rfl: 0    fluticasone-salmeterol (ADVAIR DISKUS, WIXELA INHUB) 250-50 mcg/dose inhaler, Inhale 1 puff 2 (two) times a day Rinse mouth after use , Disp: 1 Inhaler, Rfl: 5    HYDROcodone-acetaminophen (NORCO) 5-325 mg per tablet, Take 1 tablet by mouth every 4 (four) hours as needed for pain for up to 10 daysMax Daily Amount: 6 tablets, Disp: 30 tablet, Rfl: 0    insulin aspart (NOVOLOG FLEXPEN) 100 Units/mL injection pen, Inject 30 Units under the skin 3 (three) times a day with meals, Disp: 15 pen, Rfl: 1    insulin glargine (LANTUS) 100 units/mL subcutaneous injection, Inject 18 Units under the skin daily at bedtime, Disp: 10 mL, Rfl: 0    metoprolol tartrate (LOPRESSOR) 25 mg tablet, TAKE 1 TABLET BY MOUTH EVERY 12 HOURS, Disp: 180 tablet, Rfl: 3    torsemide (DEMADEX) 20 mg tablet, TAKE 1 TABLET BY MOUTH TWICE A DAY (Patient taking differently: Take 10 mg by mouth daily ), Disp: 60 tablet, Rfl: 2    warfarin (COUMADIN) 7 5 mg tablet, 7 5 mg on August 12th and 13th then INR on August 14th, Disp: 30 tablet, Rfl: 0    Insulin Pen Needle 31G X 5 MM MISC, by Does not apply route 2 (two) times a day for 50 days, Disp: 100 each, Rfl: 0    Allergies:   Allergies as of 08/19/2019 - Reviewed 08/19/2019   Allergen Reaction Noted    Latex Rash 09/15/2016       The following portions of the patient's history were reviewed and updated as appropriate: past family history, past surgical history and problem list     Laboratory Results:  Lab Results   Component Value Date    SODIUM 142 08/12/2019    K 4 1 08/12/2019     08/12/2019    CO2 29 08/12/2019     (H) 08/12/2019    CREATININE 1 46 (H) 08/12/2019    GLUC 240 (H) 08/12/2019    CALCIUM 9 2 08/12/2019        Lab Results   Component Value Date    CALCIUM 9 2 08/12/2019    PHOS 5 0 (H) 08/10/2019       Portions of the record may have been created with voice recognition software   Occasional wrong word or "sound a like" substitutions may have occurred due to the inherent limitations of voice recognition software   Read the chart carefully and recognize, using context, where substitutions have occurred

## 2019-08-19 NOTE — PATIENT INSTRUCTIONS
1  CKD stage 2/3 in setting of DM/HTN/CHF - b/l sCr 1-1 1, but with more recent sCr 1 46 as of 8/12/19 after an episode of DORA in the past month  I have concern his diabetes and HTN are contributing factors  I suspect 1 4-1 7 may be new baseline   -repeat BMP now    -avoid nonsteroidals(ibuprofen, aleve, advil, motrin)  -avoid fleet enema in setting of CKD history    2  Azotemia -  as of 8/12/19, likely higher due to diuretic use  Repeat BMP now  No uremic signs/symptoms       3  Chronic dCHF - EF 60%, on torsemide 10mg twice daily  Monitor daily weight       4  DM2, uncontrolled - last A1C 9 6 January 2018  Insulin per primary team  No repeat A1C in system       5  HTN - BP low normal on 25mg every 12 hours  Also on torsemide as above  Ideally, goal SBP 130s for renal perfusion at least, especially in light of DORA  Would consider reducing dose of metoprolol to half the current dose if BP remains low  Off calcium acetate  F/u phos/mag/PTH levels  RTC in 3 months  Obtain bloodwork as soon as you are able

## 2019-08-29 ENCOUNTER — DOCUMENTATION (OUTPATIENT)
Dept: PULMONOLOGY | Facility: HOSPITAL | Age: 69
End: 2019-08-29

## 2019-08-29 NOTE — PROGRESS NOTES
I contacted Shaina Yancey' s in regards to the Pt' s BIPAP compliance and was told that the pt  never received the device because he cannot afford it

## 2019-08-30 ENCOUNTER — OFFICE VISIT (OUTPATIENT)
Dept: PULMONOLOGY | Facility: HOSPITAL | Age: 69
End: 2019-08-30
Payer: COMMERCIAL

## 2019-08-30 ENCOUNTER — TELEPHONE (OUTPATIENT)
Dept: HEMATOLOGY ONCOLOGY | Facility: CLINIC | Age: 69
End: 2019-08-30

## 2019-08-30 ENCOUNTER — DOCUMENTATION (OUTPATIENT)
Dept: PULMONOLOGY | Facility: HOSPITAL | Age: 69
End: 2019-08-30

## 2019-08-30 VITALS
HEIGHT: 75 IN | BODY MASS INDEX: 39.17 KG/M2 | WEIGHT: 315 LBS | DIASTOLIC BLOOD PRESSURE: 80 MMHG | HEART RATE: 67 BPM | SYSTOLIC BLOOD PRESSURE: 96 MMHG | OXYGEN SATURATION: 96 % | TEMPERATURE: 98.1 F

## 2019-08-30 DIAGNOSIS — J45.40 MODERATE PERSISTENT ASTHMA WITHOUT COMPLICATION: ICD-10-CM

## 2019-08-30 DIAGNOSIS — M1A.9XX0 CHRONIC GOUT WITHOUT TOPHUS, UNSPECIFIED CAUSE, UNSPECIFIED SITE: ICD-10-CM

## 2019-08-30 DIAGNOSIS — G47.33 OSA (OBSTRUCTIVE SLEEP APNEA): ICD-10-CM

## 2019-08-30 DIAGNOSIS — C90.00 MULTIPLE MYELOMA NOT HAVING ACHIEVED REMISSION (HCC): Primary | ICD-10-CM

## 2019-08-30 PROCEDURE — 99214 OFFICE O/P EST MOD 30 MIN: CPT | Performed by: INTERNAL MEDICINE

## 2019-08-30 NOTE — TELEPHONE ENCOUNTER
Doctor Danilo office called with Ref  For Dr Yeyo Mcbride or Ranulfo, to see patient as hospital follow-up please see chart    Call patient cell phone with appt,  Needs deepika office

## 2019-08-30 NOTE — PROGRESS NOTES
Called  Dr Karmen Sahu ' s office to for an appt  They stated that they will contact the " s team to see if the pt can be seen by a PA or Dr Lupe Page and they will contact the pt  Verified PT   Phone # [phone]

## 2019-08-30 NOTE — PROGRESS NOTES
Pulmonary Follow Up Note   Walt Singh 79 y o  male MRN: [id number]  8/30/2019      Assessment/Plan: Moderate persistent asthma without complication  Pulmonary status is stable on Advair twice daily  He will continue this  Multiple myeloma not having achieved remission [facility])  Patient had recent bone marrow biopsy concerning for myeloma  I will ask Dr Josselin Ruiz to coordinate follow-up in the oncology office  Visit orders:    Diagnoses and all orders for this visit:    Multiple myeloma not having achieved remission [facility])  -     Ambulatory referral to Oncology; Future    Moderate persistent asthma without complication  -     Ambulatory referral to Pulmonology    NALLELY (obstructive sleep apnea)        Return in about 6 months (around 2/29/2020)  History of Present Illness   HPI:  Walt Singh is a 79 y o  male who is here today for follow-up regarding asthma  He was recently hospitalized with lower extremity cellulitis and acute on chronic congestive heart failure  Asthma has not been an issue for him  He is maintained on Advair twice daily  He is currently in a nursing facility  He is not needing rest therapy  He has no significant shortness of breath  He denies cough, wheeze or sputum production  He has known obstructive sleep apnea but is unable to afford the co-pay CPAP  He continues to snore  He denies daytime hypersomnolence  Review of Systems   Constitutional: Negative for chills, fever and unexpected weight change  HENT: Negative for postnasal drip and sore throat  Eyes: Negative for visual disturbance  Respiratory:        As noted in HPI   Cardiovascular: Negative for chest pain  Gastrointestinal: Negative for abdominal pain, diarrhea and vomiting  Genitourinary: Negative for difficulty urinating  Skin: Negative for rash  Neurological: Negative for headaches  Hematological: Negative for adenopathy  Psychiatric/Behavioral: Negative      All other systems reviewed and are negative  Historical Information   Past Medical History:   Diagnosis Date    CHF (congestive heart failure) (HCC)     Chronic kidney disease     COPD (chronic obstructive pulmonary disease) (HCC)     Decubitus ulcer of heel     LAST ASSESSED [id number]    Diabetes mellitus (HCC)     History of varicose veins     Hypertension     Intermittent claudication (HCC)     Neuropathy     Seasonal allergies      Past Surgical History:   Procedure Laterality Date    AMPUTATION ABOVE KNEE (AKA) Left 7/31/2019    Procedure: AMPUTATION ABOVE KNEE (AKA);   Surgeon: Arlette Null MD;  [facility];  Service: General    ANGIOPLASTY      W/ FLUOROSC ANGIOGRAPGY PERIPHERAL ARTERY ADDITIONAL  LAST ASSESSED [id number]    ANGIOPLASTY / STENTING FEMORAL      TANSCATH INTRAVASCULAR STENT PLACEMENT PERCUTANEOUS FEMORAL     CT BONE MARROW BIOPSY AND ASPIRATION  8/9/2019    FULL THICKNESS SKIN GRAFT      TENDON REPAIR      TOE AMPUTATION Left 12/27/2018    Procedure: AMPUTATION left 4th TOE;  Surgeon: Ld Springer DPM;  [facility];  Service: Podiatry     Family History   Problem Relation Age of Onset    Other Mother         CARDIAC DISORDER     Diabetes Mother     Cancer Father     Other Family         CARDIAC DISORDER     Diabetes Family     Cancer Family     Mental illness Family     Kidney disease Sister        Social History     Tobacco Use   Smoking Status Never Smoker   Smokeless Tobacco Never Used         Meds/Allergies     Current Outpatient Medications:     albuterol (PROVENTIL HFA,VENTOLIN HFA) 90 mcg/act inhaler, Inhale 2 puffs every 6 (six) hours as needed for wheezing, Disp: 1 Inhaler, Rfl: 0    allopurinol (ZYLOPRIM) 300 mg tablet, TAKE 1 TABLET BY MOUTH DAILY, Disp: 90 tablet, Rfl: 1    atorvastatin (LIPITOR) 20 mg tablet, TAKE 1 TABLET BY MOUTH ONCE DAILY, Disp: 90 tablet, Rfl: 0    BD INSULIN SYRINGE U/F 31G X 5/16" 0 5 ML MISC, USE AS DIRECTED WITH NOVOLIN 70/30, Disp: , Rfl: 0    BD PEN NEEDLE HILARIO U/F 32G X 4 MM MISC, by Other route 3 (three) times a day, Disp: 300 each, Rfl: 1    clotrimazole (LOTRIMIN) 1 % cream, Apply topically 2 (two) times a day, Disp: 30 g, Rfl: 0    fluticasone-salmeterol (ADVAIR DISKUS, WIXELA INHUB) 250-50 mcg/dose inhaler, Inhale 1 puff 2 (two) times a day Rinse mouth after use , Disp: 1 Inhaler, Rfl: 5    insulin aspart (NOVOLOG FLEXPEN) 100 Units/mL injection pen, Inject 30 Units under the skin 3 (three) times a day with meals, Disp: 15 pen, Rfl: 1    insulin glargine (LANTUS) 100 units/mL subcutaneous injection, Inject 18 Units under the skin daily at bedtime, Disp: 10 mL, Rfl: 0    Insulin Pen Needle 31G X 5 MM MISC, by Does not apply route 2 (two) times a day for 50 days, Disp: 100 each, Rfl: 0    metoprolol tartrate (LOPRESSOR) 25 mg tablet, TAKE 1 TABLET BY MOUTH EVERY 12 HOURS, Disp: 180 tablet, Rfl: 3    torsemide (DEMADEX) 10 mg tablet, Take 1 tablet (10 mg total) by mouth 2 (two) times a day, Disp: , Rfl:     warfarin (COUMADIN) 7 5 mg tablet, 7 5 mg on August 12th and 13th then INR on August 14th, Disp: 30 tablet, Rfl: 0  Allergies   Allergen Reactions    Latex Rash       Vitals: Blood pressure 96/80, pulse 67, temperature 98 1 °F (36 7 °C), temperature source Tympanic, height 6' 3" (1 905 m), weight (!) 150 kg (330 lb), SpO2 96 %  Body mass index is 41 25 kg/m²  Oxygen Therapy  SpO2: 96 %  Oxygen Therapy: None (Room air)      Physical Exam   Constitutional: He is oriented to person, place, and time  No distress  HENT:   Head: Normocephalic  Mouth/Throat: No oropharyngeal exudate  Eyes: Pupils are equal, round, and reactive to light  No scleral icterus  Neck: Neck supple  No JVD present  Cardiovascular: Normal rate and regular rhythm  Pulmonary/Chest: He has decreased breath sounds  He has no wheezes  He has no rales  Abdominal: Soft  There is no tenderness     Obese   Musculoskeletal: He exhibits no edema  Lymphadenopathy:     He has no cervical adenopathy  Neurological: He is alert and oriented to person, place, and time  Skin: Skin is warm and dry  Psychiatric: He has a normal mood and affect  Labs: I have personally reviewed pertinent lab results  Lab Results   Component Value Date    WBC 6 1 08/16/2019    HGB 10 6 08/16/2019    HCT 39 08/16/2019    MCV 96 08/16/2019     08/16/2019     Lab Results   Component Value Date    GLUCOSE 139 12/22/2015    CALCIUM 9 1 08/16/2019     01/15/2018    K 4 3 08/16/2019    CO2 104 08/16/2019     08/16/2019    BUN 64 08/16/2019    CREATININE 1 4 08/16/2019     No results found for: IGE  Lab Results   Component Value Date    ALT 19 08/08/2019    AST 21 08/08/2019    ALKPHOS 150 (H) 08/08/2019    BILITOT 0 6 01/15/2018       Imaging and other studies: I have personally reviewed pertinent reports     and I have personally reviewed pertinent films in PACS chest x-ray for 8/4/19 shows mild vascular congestion and bibasilar atelectasis    Bone marrow biopsy shows plasma cell neoplasm consistent with myeloma

## 2019-08-30 NOTE — ASSESSMENT & PLAN NOTE
Patient had recent bone marrow biopsy concerning for myeloma  I will ask Dr Jessica Rubalcava to coordinate follow-up in the oncology office

## 2019-08-31 RX ORDER — ALLOPURINOL 300 MG/1
TABLET ORAL
Qty: 90 TABLET | Refills: 1 | OUTPATIENT
Start: 2019-08-31

## 2019-09-11 ENCOUNTER — ANTICOAG VISIT (OUTPATIENT)
Dept: FAMILY MEDICINE CLINIC | Facility: CLINIC | Age: 69
End: 2019-09-11

## 2019-09-11 ENCOUNTER — TELEPHONE (OUTPATIENT)
Dept: HEMATOLOGY ONCOLOGY | Facility: CLINIC | Age: 69
End: 2019-09-11

## 2019-09-11 ENCOUNTER — TELEPHONE (OUTPATIENT)
Dept: CARDIOLOGY CLINIC | Facility: CLINIC | Age: 69
End: 2019-09-11

## 2019-09-11 ENCOUNTER — TELEPHONE (OUTPATIENT)
Dept: FAMILY MEDICINE CLINIC | Facility: CLINIC | Age: 69
End: 2019-09-11

## 2019-09-11 DIAGNOSIS — I48.20 CHRONIC ATRIAL FIBRILLATION (HCC): ICD-10-CM

## 2019-09-11 DIAGNOSIS — I48.20 CHRONIC ATRIAL FIBRILLATION (HCC): Primary | ICD-10-CM

## 2019-09-11 NOTE — TELEPHONE ENCOUNTER
Erica Delvalle from [facility] called Kandi Schirmer is now living there as of today   They need a new script for his PT INR that needs to be done tomorrow faxed to them at [phone] any question you may call Erica Delvalle at [phone]

## 2019-09-11 NOTE — TELEPHONE ENCOUNTER
David Sorensen from [facility] called regarding the hospital fu that was scheduled 9/4 and 9/9 and canceled  She stated that was some confusion as to why it was canceled and would like to reschedule   David Sorensen can be reached at [phone]

## 2019-09-11 NOTE — TELEPHONE ENCOUNTER
I don't follow carmen-they need to contact his cardiologist-will he be living there temporarily or permanently?

## 2019-09-12 ENCOUNTER — TELEPHONE (OUTPATIENT)
Dept: FAMILY MEDICINE CLINIC | Facility: CLINIC | Age: 69
End: 2019-09-12

## 2019-09-12 ENCOUNTER — TELEPHONE (OUTPATIENT)
Dept: CARDIOLOGY CLINIC | Facility: CLINIC | Age: 69
End: 2019-09-12

## 2019-09-12 NOTE — TELEPHONE ENCOUNTER
I called [facility] to see if pt needed a home visit    They report that pt is mobile and is scheduled to come in to office to see you on 9/23/19 --bb

## 2019-09-12 NOTE — TELEPHONE ENCOUNTER
Attempted calling home phone , received message that mail box in full, unable to leave message regarding pt/inr result of 2 1 today  Tried calling cell phone listed, person identified as crystal dorsey on voice mail,left message if his is cell phone for patient, please call office  Will wait for return call

## 2019-09-12 NOTE — TELEPHONE ENCOUNTER
Attempted calling patient x 2   Home phone number answering machine had message, mailbox full, unable to leave message, called to cell phone, left message for patient ot call office regarding inr result  will attempt calling patient later today

## 2019-09-13 ENCOUNTER — ANTICOAG VISIT (OUTPATIENT)
Dept: CARDIOLOGY CLINIC | Facility: CLINIC | Age: 69
End: 2019-09-13

## 2019-09-13 DIAGNOSIS — I48.20 CHRONIC ATRIAL FIBRILLATION (HCC): ICD-10-CM

## 2019-09-13 LAB — INR PPP: 2.1 (ref 0.84–1.19)

## 2019-09-13 NOTE — PROGRESS NOTES
Pt is in [facility]  Christian Paulino I spoke with Ko at [address] [facility] called [phone]   And faxed instructions to [phone] pt has been taking 7 5 mg daily   will continue same and recheck in 2 weeks

## 2019-09-13 NOTE — TELEPHONE ENCOUNTER
9/13/19, attempted calling patients home phone, again received message memory full , unable to leave message  Called listed cell phone, received voice message that it is samia  Called to patient's son, afshin,left message on his cell phone , [phone] , please call our office with contact number for patient   will wait for return call

## 2019-09-13 NOTE — TELEPHONE ENCOUNTER
researching chart, noted a call from Ko at [facility] , [phone], she had requested a new pt/inr script on 9/11  Placed call to Ko, asked she return my call regarding patient  Waiting for her call back

## 2019-09-16 ENCOUNTER — OFFICE VISIT (OUTPATIENT)
Dept: HEMATOLOGY ONCOLOGY | Facility: HOSPITAL | Age: 69
End: 2019-09-16
Payer: COMMERCIAL

## 2019-09-16 VITALS
SYSTOLIC BLOOD PRESSURE: 142 MMHG | HEIGHT: 75 IN | TEMPERATURE: 97.5 F | BODY MASS INDEX: 39.17 KG/M2 | HEART RATE: 57 BPM | OXYGEN SATURATION: 95 % | WEIGHT: 315 LBS | DIASTOLIC BLOOD PRESSURE: 66 MMHG | RESPIRATION RATE: 16 BRPM

## 2019-09-16 DIAGNOSIS — C90.00 MULTIPLE MYELOMA NOT HAVING ACHIEVED REMISSION (HCC): ICD-10-CM

## 2019-09-16 PROCEDURE — 99214 OFFICE O/P EST MOD 30 MIN: CPT | Performed by: INTERNAL MEDICINE

## 2019-09-16 NOTE — PROGRESS NOTES
Hematology/Oncology Outpatient Follow- up Note  Bibi Beaulieu 81 y o  male MRN: @ Encounter: [id number]        Date:  9/16/2019    Presenting Complaint/Diagnosis :     Biclonal gammopathy with IgG Kappa    HPI:      The patient was admitted to the hospital with wound infection  He has a history of DM, HTN, and several other comorbid conditions  He seems to have had a slightly elevated creatinine for about the past year, seems to be slightly increasing more recently  He has also been anemic for quiet some time  Skeletal survey was negative but we did a bone marrow biopsy which showed plasma cell neoplasm consistent with myeloma so he was referred to see us  Previous Hematologic/ Oncologic History:      Workup    Current Hematologic/ Oncologic Treatment:      Workup    Interval History:      The patient is a pleasant 74-year-old male who was in the hospital for wound infection  He has history of diabetes and hypertension along with other comorbidities and had an elevated creatinine around 1 54 along with anemia with a hemoglobin of 8 8  We thought these could be related to renal insufficiency from his other comorbidities but we ended up doing a bone marrow biopsy  The bone marrow biopsy actually revealed  arr(1-22)x2,(XY)x1 Along with A light chain restricted plasma cell neoplasm with 10-15% plasma cells compatible with plasma cell myeloma  Adequate iron was seen  Based on his bone marrow biopsy and the fact that he has both renal insufficiency and anemia symptomatic myeloma cannot be ruled out so he would be a candidate for treatment  Looking at his comorbidities where he has had a below the knee amputation also I think he is more candidate for Velcade and Decadron and may not be a candidate for transplant at this point  I explained this to him today   I would recommend weekly Velcade and Decadron initially to see if he has a response and then possibly considering Revlimid down the road depending on how he tolerates the initial regimen  The patient himself as a baseline  Denies any nausea denies any vomiting denies any diarrhea  The rest of his 14 point review of systems today was negative  Test Results:    Imaging: No results found  Labs:   Lab Results   Component Value Date    WBC 6 1 08/16/2019    HGB 10 6 08/16/2019    HCT 39 08/16/2019    MCV 96 08/16/2019     08/16/2019     Lab Results   Component Value Date     01/15/2018    K 4 3 08/16/2019     08/16/2019    CO2 104 08/16/2019    ANIONGAP 11 12/22/2015    BUN 64 08/16/2019    CREATININE 1 4 08/16/2019    GLUCOSE 139 12/22/2015    GLUF 141 (H) 02/26/2019    CALCIUM 9 1 08/16/2019    AST 21 08/08/2019    ALT 19 08/08/2019    ALKPHOS 150 (H) 08/08/2019    PROT 7 1 01/15/2018    BILITOT 0 6 01/15/2018    EGFR 51 08/16/2019         Lab Results   Component Value Date    SPEP  08/04/2019     The SPEP shows a biclonal gammopathy  Immunofixation to be performed  Reviewed by: Hemant Valdez MD ([id number]) **Electronic Signature**    UPEP  08/04/2019     The UPEP shows non-selective proteinuria  The UPEP shows a monoclonal gammopathy  Immunofixation to be performed  Reviewed by: Lam Gerardo MD **Electronic Signature**       Lab Results   Component Value Date    IRON 51 (L) 08/05/2019    TIBC 348 08/05/2019    FERRITIN 134 08/05/2019       ROS: As stated in the history of present illness otherwise his 14 point review of systems today was negative        Active Problems:   Patient Active Problem List   Diagnosis    [facility] ulcer [facility]    Diabetes mellitus type 2, uncontrolled ([facility] )    Chronic venous stasis dermatitis of both lower extremities    Essential hypertension    Chronic atrial fibrillation (HCC)    Morbid obesity (HCC)    Acute on chronic diastolic congestive heart failure (HCC)    Cardiomyopathy ([facility] )    Diabetes, polyneuropathy ([facility] )    GERD (gastroesophageal reflux disease)    Hyperlipidemia    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, right ([facility] )    Varicose veins of lower extremity with ulcer, left ([address] )    Onychomycosis    Gallstones    Blood alkaline phosphatase increased compared with prior measurement    Chronic heel ulcer, left, with fat layer exposed ([facility] )    Localized edema    Diabetic ulcer of toe of left foot associated with type 2 diabetes mellitus, limited to breakdown of skin (HCC)    Peripheral vascular disease ([facility] )    NALLELY (obstructive sleep apnea)    Moderate persistent asthma without complication    Type 2 diabetes mellitus with left diabetic foot ulcer (HCC)    Acute renal failure superimposed on stage 3 chronic kidney disease (HCC)    Skin ulcer of left foot with necrosis of muscle (HCC)    Biclonal gammopathy    Multiple myeloma not having achieved remission [facility])       Past Medical History:   Past Medical History:   Diagnosis Date    CHF (congestive heart failure) (HCC)     Chronic kidney disease     COPD (chronic obstructive pulmonary disease) (HCC)     Decubitus ulcer of heel     LAST ASSESSED [id number]    Diabetes mellitus ([facility]     History of varicose veins     Hypertension     Intermittent claudication (HCC)     Neuropathy     Seasonal allergies        Surgical History:   Past Surgical History:   Procedure Laterality Date    AMPUTATION ABOVE KNEE (AKA) Left 7/31/2019    Procedure: AMPUTATION ABOVE KNEE (AKA);   Surgeon: Neo Mei MD;  [facility];  Service: General    ANGIOPLASTY      W/ FLUOROSC ANGIOGRAPGY PERIPHERAL ARTERY ADDITIONAL  LAST ASSESSED [id number]    ANGIOPLASTY / STENTING FEMORAL      TANSCATH INTRAVASCULAR STENT PLACEMENT PERCUTANEOUS FEMORAL     CT BONE MARROW BIOPSY AND ASPIRATION  8/9/2019    FULL THICKNESS SKIN GRAFT      TENDON REPAIR      TOE AMPUTATION Left 12/27/2018    Procedure: AMPUTATION left 4th TOE;  Surgeon: Enrrique Zamudio DPM;  [facility];  Service: Podiatry       Family History:    Family History   Problem Relation Age of Onset    Other Mother         CARDIAC DISORDER     Diabetes Mother     Cancer Father     Other Family         CARDIAC DISORDER     Diabetes Family     Cancer Family     Mental illness Family     Kidney disease Sister        Cancer-related family history includes Cancer in his family and father      Social History:   Social History     Socioeconomic History    Marital status:      Spouse name: Not on file    Number of children: 1    Years of education: Not on file    Highest education level: Not on file   Occupational History    Occupation: RETIRED   Social Needs    Financial resource strain: Not on file    Food insecurity:     Worry: Not on file     Inability: Not on file   [employer] needs:     Medical: Not on file     Non-medical: Not on file   Tobacco Use    Smoking status: Never Smoker    Smokeless tobacco: Never Used   Substance and Sexual Activity    Alcohol use: Not Currently    Drug use: Not Currently    Sexual activity: Not Currently   Lifestyle    Physical activity:     Days per week: Not on file     Minutes per session: Not on file    Stress: Not on file   Relationships    Social connections:     Talks on phone: Not on file     Gets together: Not on file     Attends Taoism service: Not on file     Active member of club or organization: Not on file     Attends meetings of clubs or organizations: Not on file     Relationship status: Not on file    Intimate partner violence:     Fear of current or ex partner: Not on file     Emotionally abused: Not on file     Physically abused: Not on file     Forced sexual activity: Not on file   Other Topics Concern    Not on file   Social History Narrative    DENIED [address]    FEELS SAFE AT [address] [facility]    One son, 2 granddavid       Current Medications:   Current Outpatient Medications   Medication Sig Dispense Refill    albuterol (PROVENTIL HFA,VENTOLIN HFA) 90 mcg/act inhaler Inhale 2 puffs every 6 (six) hours as needed for wheezing 1 Inhaler 0    allopurinol (ZYLOPRIM) 300 mg tablet TAKE 1 TABLET BY MOUTH DAILY 90 tablet 1    atorvastatin (LIPITOR) 20 mg tablet TAKE 1 TABLET BY MOUTH ONCE DAILY 90 tablet 0    BD INSULIN SYRINGE U/F 31G X 5/16" 0 5 ML MISC USE AS DIRECTED WITH NOVOLIN 70/30  0    BD PEN NEEDLE HILARIO U/F 32G X 4 MM MISC by Other route 3 (three) times a day 300 each 1    clotrimazole (LOTRIMIN) 1 % cream Apply topically 2 (two) times a day 30 g 0    fluticasone-salmeterol (ADVAIR DISKUS, WIXELA INHUB) 250-50 mcg/dose inhaler Inhale 1 puff 2 (two) times a day Rinse mouth after use  1 Inhaler 5    insulin aspart (NOVOLOG FLEXPEN) 100 Units/mL injection pen Inject 30 Units under the skin 3 (three) times a day with meals 15 pen 1    insulin glargine (LANTUS) 100 units/mL subcutaneous injection Inject 18 Units under the skin daily at bedtime 10 mL 0    metoprolol tartrate (LOPRESSOR) 25 mg tablet TAKE 1 TABLET BY MOUTH EVERY 12 HOURS 180 tablet 3    torsemide (DEMADEX) 10 mg tablet Take 1 tablet (10 mg total) by mouth 2 (two) times a day      warfarin (COUMADIN) 7 5 mg tablet 7 5 mg on August 12th and 13th then INR on August 14th 30 tablet 0    Insulin Pen Needle 31G X 5 MM MISC by Does not apply route 2 (two) times a day for 50 days 100 each 0     No current facility-administered medications for this visit  Allergies: Allergies   Allergen Reactions    Latex Rash       Physical Exam:    Body surface area is 2 72 meters squared      Wt Readings from Last 3 Encounters:   09/16/19 (!) 150 kg (330 lb)   08/30/19 (!) 150 kg (330 lb)   08/12/19 (!) 147 kg (323 lb 13 7 oz)        Temp Readings from Last 3 Encounters:   09/16/19 97 5 °F (36 4 °C) (Tympanic)   08/30/19 98 1 °F (36 7 °C) (Tympanic) 08/19/19 99 °F (37 2 °C) (Tympanic)        BP Readings from Last 3 Encounters:   09/16/19 142/66   08/30/19 96/80   08/19/19 92/72         Pulse Readings from Last 3 Encounters:   09/16/19 57   08/30/19 67   08/19/19 78         Physical Exam     Constitutional   General appearance: No acute distress, well appearing and well nourished  Eyes   Conjunctiva and lids: No swelling, erythema or discharge  Pupils and irises: Equal, round and reactive to light  Ears, Nose, Mouth, and Throat   External inspection of ears and nose: Normal     Nasal mucosa, septum, and turbinates: Normal without edema or erythema  Oropharynx: Normal with no erythema, edema, exudate or lesions  Pulmonary   Respiratory effort: No increased work of breathing or signs of respiratory distress  Auscultation of lungs: Clear to auscultation  Cardiovascular   Palpation of heart: Normal PMI, no thrills  Auscultation of heart: Normal rate and rhythm, normal S1 and S2, without murmurs  Examination of extremities for edema and/or varicosities: Normal     Carotid pulses: Normal     Abdomen   Abdomen: Non-tender, no masses  Liver and spleen: No hepatomegaly or splenomegaly  Lymphatic   Palpation of lymph nodes in neck: No lymphadenopathy  Musculoskeletal   Gait and station: Normal     Digits and nails: Normal without clubbing or cyanosis  Inspection/palpation of joints, bones, and muscles: Normal     Skin   Skin and subcutaneous tissue: Normal without rashes or lesions  Neurologic   Cranial nerves: Cranial nerves 2-12 intact  Sensation: No sensory loss  Psychiatric   Orientation to person, place, and time: Normal     Mood and affect: Normal         Assessment / Plan:    The patient is a 78 yo male with multiple comorbid conditions including DM and HTN  We were consulted to help evaluate recent labs revealing biclonal gammopathies with IgG Kappa   Because of his slightly elevated kidney function and anemia with the presence of IgG kappa, this warranted a workup for multiple myeloma  His bone marrow biopsy actually did show multiple myeloma  Because of his comorbidities we had a long discussion about treatment options  For now we agreed upon Velcade and Decadron weekly on a 35 day schedule  Velcade 1 3 mg/m² and Decadron will be 20 mg every week  I will repeat his myeloma blood work after 2 months of therapy  Will also give him Venofer for 4 more doses  The patient will sign a consent form today  I explained the rationale behind treatment  The possible side effects to include but not limited to neuropathy, low blood counts, nausea, vomiting, diarrhea  The patient is agreeable to proceed  I will also put him on acyclovir 400 mg daily  Skeletal survey was negative  Goals and Barriers:  Current Goal:  Prolong Survival from Myeloma  Barriers: None  Patient's Capacity to Self Care:  Patient able to self care  Portions of the record may have been created with voice recognition software   Occasional wrong word or "sound a like" substitutions may have occurred due to the inherent limitations of voice recognition software   Read the chart carefully and recognize, using context, where substitutions have occurred  detailed exam

## 2019-09-17 ENCOUNTER — TELEPHONE (OUTPATIENT)
Dept: CARDIOLOGY CLINIC | Facility: CLINIC | Age: 69
End: 2019-09-17

## 2019-09-17 NOTE — TELEPHONE ENCOUNTER
Phone call from Ko at [facility], reports their office had a problem with the fax machine last week, requested I refax instructions for warfarin dosing and inr testing from 9/13  instructions were written and faxed as requested

## 2019-09-20 ENCOUNTER — OFFICE VISIT (OUTPATIENT)
Dept: PODIATRY | Facility: CLINIC | Age: 69
End: 2019-09-20
Payer: COMMERCIAL

## 2019-09-20 VITALS
BODY MASS INDEX: 39.17 KG/M2 | DIASTOLIC BLOOD PRESSURE: 67 MMHG | SYSTOLIC BLOOD PRESSURE: 141 MMHG | WEIGHT: 315 LBS | HEIGHT: 75 IN

## 2019-09-20 DIAGNOSIS — B35.1 ONYCHOMYCOSIS: ICD-10-CM

## 2019-09-20 DIAGNOSIS — I83.212 VARICOSE VEINS OF RIGHT LOWER EXTREMITY WITH INFLAMMATION, WITH ULCER OF CALF LIMITED TO BREAKDOWN OF SKIN (HCC): ICD-10-CM

## 2019-09-20 DIAGNOSIS — S78.112A ABOVE KNEE AMPUTATION OF LEFT LOWER EXTREMITY (HCC): ICD-10-CM

## 2019-09-20 DIAGNOSIS — L97.211 VARICOSE VEINS OF RIGHT LOWER EXTREMITY WITH INFLAMMATION, WITH ULCER OF CALF LIMITED TO BREAKDOWN OF SKIN (HCC): ICD-10-CM

## 2019-09-20 DIAGNOSIS — E11.42 DIABETIC POLYNEUROPATHY ASSOCIATED WITH TYPE 2 DIABETES MELLITUS (HCC): Primary | ICD-10-CM

## 2019-09-20 DIAGNOSIS — I73.9 PERIPHERAL VASCULAR DISEASE (HCC): ICD-10-CM

## 2019-09-20 PROCEDURE — 99213 OFFICE O/P EST LOW 20 MIN: CPT | Performed by: PODIATRIST

## 2019-09-20 NOTE — ASSESSMENT & PLAN NOTE
Lab Results   Component Value Date    HGBA1C 9 4 (H) 12/27/2018       No results for input(s): POCGLU in the last 72 hours     -Educated on DM risk to lower extremities, proper shoe gear, and daily monitoring of feet    -Educated on A1C and the risks of poorly controlled Diabetes   -Discussed weight loss and suitable exercise regiment    -High risk for complication on the right foot   At risk foot care every 9-10 weeks

## 2019-09-20 NOTE — PROGRESS NOTES
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, right ([facility] )  Needs to wear compression stockings and elevate the limb  Diagnoses and all orders for this visit:    Diabetic polyneuropathy associated with type 2 diabetes mellitus ([place] [address] )  Comments:  DM foot exam today    Peripheral vascular disease ([place] [address] )    Onychomycosis  Comments:  at risk foot care every 9-10 weeks  Shoes in good condition    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)  Comments:  stable    Varicose veins of right lower extremity with inflammation, with ulcer of calf limited to breakdown of skin (HCC)  -     Compression Stocking          Subjective:      Patient ID: Yasir Santamaria is a 81 y o  male  Patient is here for at-risk diabetic foot exam   In July patient had a severe infection in his left lower extremity resulting in an above the knee amputation  He has been in a nursing home  That seems to have healed well  He arrives today in a wheelchair in good spirits stating he "is feeling the best he has felt in 5 years"  Patient has poorly controlled diabetes with neuropathy, peripheral arterial disease, history of venous ulcerations on his legs  He has long history of not wearing compression stockings again infections in his legs  He is not wearing 1 today and there is a weeping wound on his right leg  Of note patient was recently diagnosed with multiple myeloma and is seen [facility] associated    He seems to be handling the diagnosis well and overall is in good spirits  The following portions of the patient's history were reviewed and updated as appropriate: allergies, current medications, past family history, past medical history, past social history, past surgical history and problem list     Review of Systems   Constitutional: Positive for activity change  Negative for chills, fatigue and fever  Respiratory: Negative for cough and shortness of breath  Cardiovascular: Positive for leg swelling  Gastrointestinal: Negative for diarrhea and nausea  Musculoskeletal: Positive for gait problem (  Recent left above knee amputation)  Skin: Positive for color change ( thick nails on the right foot) and wound  Rash:  superficial draining wounds on patient's right leg  Neurological: Positive for numbness  Objective:      /67   Ht 6' 3" (1 905 m)   Wt (!) 150 kg (330 lb)   BMI 41 25 kg/m²          Physical Exam   Cardiovascular: Pulses are weak pulses  Pulses:       Dorsalis pedis pulses are 0 (Pulses are faintly dopplerable on the right) on the right side  Posterior tibial pulses are 0 on the right side  Feet:   Right Foot:   Skin Integrity: Negative for skin breakdown, erythema or callus  Left Foot: amputated      Diabetic Foot Exam    Patient's shoes and socks removed  Right Foot/Ankle   Right Foot Inspection  Skin Exam: skin not intact ( serous draining superficial ulceration to the right mid leg anteriorly with venous stasis dermatitis and inflammation  No cellulitis), no callus, no erythema, no maceration and no callus                          Toe Exam: swelling ( +2 right lower extremity pitting edema)  Sensory   Vibration: absent  Proprioception: absent   Monofilament testing: absent  Vascular  Capillary refills: < 3 seconds  The right DP pulse is 0 (Pulses are faintly dopplerable on the right)  The right PT pulse is 0       Left Foot/Ankle  Left Foot Inspection Amputation: amputation left foot (Comments:  above knee amputation)                      Assign Risk Category:  Deformity present;  Loss of protective sensation; Weak pulses       Risk: 3

## 2019-09-23 ENCOUNTER — OFFICE VISIT (OUTPATIENT)
Dept: FAMILY MEDICINE CLINIC | Facility: CLINIC | Age: 69
End: 2019-09-23
Payer: COMMERCIAL

## 2019-09-23 VITALS — HEART RATE: 60 BPM | SYSTOLIC BLOOD PRESSURE: 122 MMHG | DIASTOLIC BLOOD PRESSURE: 76 MMHG

## 2019-09-23 DIAGNOSIS — L03.115 CELLULITIS OF RIGHT LOWER EXTREMITY: ICD-10-CM

## 2019-09-23 DIAGNOSIS — E11.65 UNCONTROLLED TYPE 2 DIABETES MELLITUS WITH HYPERGLYCEMIA (HCC): Primary | ICD-10-CM

## 2019-09-23 DIAGNOSIS — C90.00 MULTIPLE MYELOMA NOT HAVING ACHIEVED REMISSION (HCC): ICD-10-CM

## 2019-09-23 DIAGNOSIS — I73.9 PERIPHERAL VASCULAR DISEASE (HCC): ICD-10-CM

## 2019-09-23 PROBLEM — L03.90 CELLULITIS: Status: ACTIVE | Noted: 2019-09-23

## 2019-09-23 PROCEDURE — 99214 OFFICE O/P EST MOD 30 MIN: CPT | Performed by: FAMILY MEDICINE

## 2019-09-23 RX ORDER — LEVOFLOXACIN 500 MG/1
500 TABLET, FILM COATED ORAL EVERY 24 HOURS
Qty: 10 TABLET | Refills: 0 | Status: SHIPPED | OUTPATIENT
Start: 2019-09-23 | End: 2019-10-03

## 2019-09-23 NOTE — PROGRESS NOTES
Assessment/Plan:    Diabetes mellitus type 2, uncontrolled ([place] [address] )  Lab Results   Component Value Date    HGBA1C 9 4 (H) 12/27/2018   Has had episodes of low BS-rec holding insulin if BS less than 90  No results for input(s): POCGLU in the last 72 hours  Blood Sugar Average: Last 72 hrs:      Multiple myeloma not having achieved remission ([facility] )  Getting infusion-needs to talk to oncology team whether cellulitis of r leg will hold up treatment       Diagnoses and all orders for this visit:    Uncontrolled type 2 diabetes mellitus with hyperglycemia ([facility] )  -     IRIS Diabetic eye exam    Cellulitis of right lower extremity  -     levofloxacin (LEVAQUIN) 500 mg tablet; Take 1 tablet (500 mg total) by mouth every 24 hours for 10 days    Multiple myeloma not having achieved remission ([facility])    Other orders  -     metFORMIN (GLUCOPHAGE) 1000 MG tablet  -     bortezomib (VELCADE); Infuse into a venous catheter once          Subjective:      Patient ID: Igor Cuellar is a 81 y o  male  Hospital f/u-had amputation of l leg, also dxed with multiple myeloma -getting infusion treatments, hasn't dennis fitted for prosthesis for l leg yet, saw foot doctor last week      The following portions of the patient's history were reviewed and updated as appropriate: allergies, current medications, past family history, past medical history, past social history, past surgical history and problem list     Review of Systems   Constitutional: Negative for activity change, appetite change and unexpected weight change  Respiratory: Negative for shortness of breath  Cardiovascular: Negative for chest pain  Skin: Positive for wound  redness   Neurological: Negative for dizziness and headaches  Objective:      /76 (BP Location: Left arm, Patient Position: Sitting, Cuff Size: Large)   Pulse 60          Physical Exam   Constitutional: He appears well-developed and well-nourished  Neck: No thyromegaly present  Cardiovascular: Normal rate, regular rhythm and normal heart sounds  Pulmonary/Chest: Effort normal and breath sounds normal    Musculoskeletal: He exhibits edema  Lymphadenopathy:     He has no cervical adenopathy  Skin: Rash noted  There is erythema  Vitals reviewed

## 2019-09-23 NOTE — ASSESSMENT & PLAN NOTE
Lab Results   Component Value Date    HGBA1C 9 4 (H) 12/27/2018   Has had episodes of low BS-rec holding insulin if BS less than 90  No results for input(s): POCGLU in the last 72 hours      Blood Sugar Average: Last 72 hrs:

## 2019-09-23 NOTE — PATIENT INSTRUCTIONS
Needs Pt and wound care eval, start Levaquin, await oncology eval, no vascular f/u noted-referral done for doc who did surgery ( Dr Aguila Slider)

## 2019-09-24 ENCOUNTER — TELEPHONE (OUTPATIENT)
Dept: FAMILY MEDICINE CLINIC | Facility: CLINIC | Age: 69
End: 2019-09-24

## 2019-09-24 ENCOUNTER — HOSPITAL ENCOUNTER (OUTPATIENT)
Dept: INFUSION CENTER | Facility: HOSPITAL | Age: 69
Discharge: HOME | End: 2019-09-24
Attending: INTERNAL MEDICINE
Payer: COMMERCIAL

## 2019-09-24 VITALS
OXYGEN SATURATION: 94 % | SYSTOLIC BLOOD PRESSURE: 128 MMHG | DIASTOLIC BLOOD PRESSURE: 56 MMHG | HEART RATE: 61 BPM | RESPIRATION RATE: 18 BRPM | TEMPERATURE: 99.1 F | HEIGHT: 75 IN | WEIGHT: 315 LBS | BODY MASS INDEX: 39.17 KG/M2

## 2019-09-24 DIAGNOSIS — I73.9 PERIPHERAL VASCULAR DISEASE (HCC): Primary | ICD-10-CM

## 2019-09-24 DIAGNOSIS — C90.00 MULTIPLE MYELOMA NOT HAVING ACHIEVED REMISSION (HCC): Primary | ICD-10-CM

## 2019-09-24 PROCEDURE — 96374 THER/PROPH/DIAG INJ IV PUSH: CPT

## 2019-09-24 PROCEDURE — 96401 CHEMO ANTI-NEOPL SQ/IM: CPT

## 2019-09-24 RX ADMIN — DEXAMETHASONE SODIUM PHOSPHATE 20 MG: 10 INJECTION, SOLUTION INTRAMUSCULAR; INTRAVENOUS at 15:00

## 2019-09-24 RX ADMIN — BORTEZOMIB 3.5 MG: 3.5 INJECTION, POWDER, LYOPHILIZED, FOR SOLUTION INTRAVENOUS; SUBCUTANEOUS at 15:31

## 2019-09-24 RX ADMIN — IRON SUCROSE 200 MG: 20 INJECTION, SOLUTION INTRAVENOUS at 14:50

## 2019-09-24 NOTE — TELEPHONE ENCOUNTER
Aman Don from Dr Sofi Hoff office called they do not need to see him  They said he should be seen by vascular Dr Darcy Belle   Please remove the general surgery order and add a order for Dr Destiny prasad

## 2019-09-24 NOTE — PROGRESS NOTES
Pt here for Venofer and Velcade injection  Instructions reviewed  Venofer given IVP over 5mins  Leanne well  Decadron given IVPB, Velcade given SQ Lt side of abdomen  Dsd applied  PIV removed intact  Pt disch via w/c to [facility] facility with their transport  AVS sent with pt

## 2019-09-24 NOTE — PLAN OF CARE
Problem: Potential for Falls  Goal: Patient will remain free of falls  Description  INTERVENTIONS:  - Assess patient frequently for physical needs  -  Identify cognitive and physical deficits and behaviors that affect risk of falls  -  [place] fall precautions as indicated by assessment   - Educate patient/family on patient safety including physical limitations  - Instruct patient to call for assistance with activity based on assessment  - Modify environment to reduce risk of injury  - Consider OT/PT consult to assist with strengthening/mobility  Outcome: Progressing     Problem: Knowledge Deficit  Goal: Patient/family/caregiver demonstrates understanding of disease process, treatment plan, medications, and discharge instructions  Description  Complete learning assessment and assess knowledge base    Interventions:  - Provide teaching at level of understanding  - Provide teaching via preferred learning methods  Outcome: Progressing

## 2019-09-25 NOTE — SOCIAL WORK
MSW received pt's distress thermometer, ranking 4/10  Pt listed no family or spiritual concerns at this time  5/6 emotional concerns and 4/6 practical problems were selected, as well as multiple physical concerns such as bathing/dressing, changes in urination, getting around, nausea, dry/congested nose, itchy/skin, and possibly neuropathy  MSW spoke with pt during treatment today in the infusion center  Pt recently had left leg amputated and will eventually be fit for prosthetic  Pt expressed interest in physical therapy but could not find one that accepted [employer], and had made a joke about health insurance  MSW inquired if pt is having issues with coverage at [place] [id number] and mentioned receiving a bill which MSW will contact financial counselor about  MSW confirmed with [facility] that we accept [employer], encouraging pt to make an appt  Pt felt discouraged due to the $25 copay at each visit  Pt is facing financial struggles and states that he currently pays over $4000 per month for [facility]  He is on a list for placement at the [facility] in [facility]  MSW inquired about [address] and pt gave permission for MSW to put in a referral     Pt mentioned that he is selling his house next week and that his son has been helping him with all of the finances  Pt appeared very overwhelmed  MSW offered to help pt seek financial assistance and asked for permission to speak with pt's son if pt preferred that  Pt preferred this option but did not know son's phone number offhand  MSW provided contact information for pt to have for himself and to give son  MSW encouraged pt to reach out for any further help  MSW will continue to follow up

## 2019-09-26 ENCOUNTER — ANTICOAG VISIT (OUTPATIENT)
Dept: CARDIOLOGY CLINIC | Facility: CLINIC | Age: 69
End: 2019-09-26

## 2019-09-26 DIAGNOSIS — I48.20 CHRONIC ATRIAL FIBRILLATION (HCC): ICD-10-CM

## 2019-09-26 LAB — INR PPP: 1.4 (ref 0.84–1.19)

## 2019-09-26 NOTE — PROGRESS NOTES
9/26/19, tc to nino at [facility], pt was started  on Levaquin 5 days ago for total 10 day, will take 12 5 mg x 1 day, then 10 mg fri, 7 5mg sat, 10 mg sun, inr due mon  Faxed same to [facility]

## 2019-09-27 ENCOUNTER — TELEPHONE (OUTPATIENT)
Dept: NEPHROLOGY | Facility: CLINIC | Age: 69
End: 2019-09-27

## 2019-09-30 ENCOUNTER — ANTICOAG VISIT (OUTPATIENT)
Dept: CARDIOLOGY CLINIC | Facility: CLINIC | Age: 69
End: 2019-09-30

## 2019-09-30 DIAGNOSIS — I48.20 CHRONIC ATRIAL FIBRILLATION (HCC): ICD-10-CM

## 2019-09-30 LAB — INR PPP: 1.7 (ref 0.84–1.19)

## 2019-09-30 NOTE — PROGRESS NOTES
Called Mavis at [facility] and also faxed her  Pt is on Levaquin through 10/4  Will take 10 mg Wed 7 5 others recheck INR on 10/7

## 2019-10-01 ENCOUNTER — HOSPITAL ENCOUNTER (OUTPATIENT)
Dept: INFUSION CENTER | Facility: HOSPITAL | Age: 69
Discharge: HOME | End: 2019-10-01
Attending: INTERNAL MEDICINE
Payer: COMMERCIAL

## 2019-10-01 ENCOUNTER — TELEPHONE (OUTPATIENT)
Dept: INFUSION CENTER | Facility: HOSPITAL | Age: 69
End: 2019-10-01

## 2019-10-01 VITALS
HEIGHT: 75 IN | WEIGHT: 315 LBS | DIASTOLIC BLOOD PRESSURE: 77 MMHG | BODY MASS INDEX: 39.17 KG/M2 | OXYGEN SATURATION: 94 % | SYSTOLIC BLOOD PRESSURE: 143 MMHG | TEMPERATURE: 99.7 F | RESPIRATION RATE: 20 BRPM | HEART RATE: 67 BPM

## 2019-10-01 DIAGNOSIS — C90.00 MULTIPLE MYELOMA NOT HAVING ACHIEVED REMISSION (HCC): Primary | ICD-10-CM

## 2019-10-01 PROCEDURE — 96365 THER/PROPH/DIAG IV INF INIT: CPT

## 2019-10-01 PROCEDURE — 96401 CHEMO ANTI-NEOPL SQ/IM: CPT

## 2019-10-01 RX ORDER — DEXAMETHASONE 4 MG/1
20 TABLET ORAL ONCE
Status: CANCELLED
Start: 2019-10-01

## 2019-10-01 RX ORDER — ACYCLOVIR 400 MG/1
400 TABLET ORAL DAILY
COMMUNITY
End: 2019-10-07 | Stop reason: SDUPTHER

## 2019-10-01 RX ORDER — SODIUM CHLORIDE 9 MG/ML
20 INJECTION, SOLUTION INTRAVENOUS ONCE
Status: COMPLETED | OUTPATIENT
Start: 2019-10-01 | End: 2019-10-01

## 2019-10-01 RX ORDER — ACYCLOVIR 400 MG/1
400 TABLET ORAL DAILY
Qty: 30 TABLET | Refills: 3 | Status: SHIPPED | OUTPATIENT
Start: 2019-10-01 | End: 2020-02-12

## 2019-10-01 RX ORDER — DEXAMETHASONE 4 MG/1
20 TABLET ORAL ONCE
Status: COMPLETED | OUTPATIENT
Start: 2019-10-01 | End: 2019-10-01

## 2019-10-01 RX ORDER — SODIUM CHLORIDE 9 MG/ML
20 INJECTION, SOLUTION INTRAVENOUS ONCE
Status: CANCELLED
Start: 2019-10-01

## 2019-10-01 RX ADMIN — SODIUM CHLORIDE 20 ML/HR: 9 INJECTION, SOLUTION INTRAVENOUS at 14:42

## 2019-10-01 RX ADMIN — IRON SUCROSE 200 MG: 20 INJECTION, SOLUTION INTRAVENOUS at 14:47

## 2019-10-01 RX ADMIN — DEXAMETHASONE 20 MG: 4 TABLET ORAL at 14:48

## 2019-10-01 RX ADMIN — BORTEZOMIB 3.5 MG: 3.5 INJECTION, POWDER, LYOPHILIZED, FOR SOLUTION INTRAVENOUS; SUBCUTANEOUS at 15:14

## 2019-10-01 NOTE — TELEPHONE ENCOUNTER
MSW received voicemail on 9/30/19 from pt's daughter in law Sandra Hastings stating that [facility] is requesting a letter verifying that compassion funds will cover pt's copays  MSW will be drafting letter with manager approval to provide to [facility] as well as a copy for the patient  Patient is aware of this conversation

## 2019-10-01 NOTE — PLAN OF CARE
Problem: Potential for Falls  Goal: Patient will remain free of falls  Description  INTERVENTIONS:  - Assess patient frequently for physical needs  -  Identify cognitive and physical deficits and behaviors that affect risk of falls  -  [place] fall precautions as indicated by assessment   - Educate patient/family on patient safety including physical limitations  - Instruct patient to call for assistance with activity based on assessment  - Modify environment to reduce risk of injury  - Consider OT/PT consult to assist with strengthening/mobility  Outcome: Progressing     Problem: Knowledge Deficit  Goal: Patient/family/caregiver demonstrates understanding of disease process, treatment plan, medications, and discharge instructions  Description  Complete learning assessment and assess knowledge base    Interventions:  - Provide teaching at level of understanding  - Provide teaching via preferred learning methods  Outcome: Progressing

## 2019-10-01 NOTE — PROGRESS NOTES
Pt arrived via w/c for Venofer and Velcade  Leanne well  Velcade given SQ Rt side of abdomen  Venofer infused over 1 hr, PO Decadron given  PIV removed intact  Disch via w/c to [facility] with transport

## 2019-10-01 NOTE — TELEPHONE ENCOUNTER
MSW received voicemail on 9/30/19 from pt's daughter in law Emely Pride stating that [facility] physical therapy is requesting a letter verifying that compassion funds will cover pt's copays   MSW will be drafting letter to provide   [facility], patient

## 2019-10-01 NOTE — TELEPHONE ENCOUNTER
MSW received call from [facility], pt's daughter in law, who stated that she received a message from pt about MSW's role in patient support services and hoping to learn more  Patient is aware of conversation between MSW and daughter in law  Pt is living in [facility] in [place] which is further from son and daughter in law but close to his significant other and friends  Pt reports cost of living there to be very expensive  Daughter in law explained how the cost adds up according to pt's level of care and what is included with living there  It is expected that costs will drop down as pt's level of care lessens  Pt is in need of physical therapy but cannot afford copay  [employer] accepts [employer], and MSW offered to assist pt copay with compassion funds for medical necessity  MSW will be following up with pt and family as needed

## 2019-10-02 ENCOUNTER — TELEPHONE (OUTPATIENT)
Dept: FAMILY MEDICINE CLINIC | Facility: CLINIC | Age: 69
End: 2019-10-02

## 2019-10-02 NOTE — TELEPHONE ENCOUNTER
ARIA FROM [facility] CALLED IN TO REPORT THE FOLLOWING:    PT HAD HIGH BLOOD SUGAR A 11:00 AM 10/2/19, SECOND HELPING OF STRAWBERRIES ON HIS PANCAKES, SYMPTOMATIC HIGH GLYCEMIA       IF YOU HAVE ANY QUESTIONS, PLEASE CALL Tyler Michael [phone]

## 2019-10-02 NOTE — TELEPHONE ENCOUNTER
Rolly Pedraza called back  Chinedu's sugar was 435 @ 11:00 am   He said he is absolutely fine  He just called with a FYI  He reveived and infusion yesterday and had extra strawberry jam today  They will check him again tomorrow and will call if needed

## 2019-10-03 DIAGNOSIS — I10 ESSENTIAL HYPERTENSION: ICD-10-CM

## 2019-10-03 RX ORDER — LOSARTAN POTASSIUM 50 MG/1
TABLET ORAL
Qty: 90 TABLET | Refills: 1 | OUTPATIENT
Start: 2019-10-03

## 2019-10-05 NOTE — PROGRESS NOTES
Cardiology Follow Up    [facility]  1950  [id number]  [facility]  [address] [place]  [address]  [phone]  [phone]    1  Chronic atrial fibrillation  POCT ECG   2  Chronic diastolic congestive heart failure ([address] )     3  Essential hypertension     4  Pure hypercholesterolemia         Interval History:  Patient is here for a follow-up visit  He was most recently seen by me in March 2019  He has a history of chronic atrial fibrillation but has been reluctant and noncompliant with anticoagulant therapy  He had previously been on Coumadin but discontinued this but now is back on this in recent to  DVT  He had a venous Doppler December 26th 2018 which demonstrated chronic non occlusive thrombus in the left lower extremity  As well he had been hospitalized December 25th to 29th 2018 in reference to a left toe infection  He eventually underwent amputation of the left fourth toe  An arterial Doppler was done and demonstrated no significant occlusive disease  His most recent echocardiogram done April 4, 2019 demonstrated preserved LV systolic function with mild LVH  Grade 3 diastolic dysfunction was suggested  Patient was noted to have biatrial cavity enlargement with no significant valvular heart disease  Patient had a nuclear stress test done March 2016 which showed no evidence of prognostically important ischemia  EKG today demonstrates atrial fibrillation with a controlled ventricular response with no change compared to a prior tracing done July 25, 2019  Since I saw the patient last he had AKA of the left leg July 31, 2019  As well he tells me he is been diagnosed with melanoma and is getting treatment for this  From a heart point of view he has had no chest pain or significant dyspnea  He now lives at [facility]      Patient Active Problem List   Diagnosis    Diabetic foot ulcer ([facility] )    Diabetes mellitus type 2, uncontrolled ([facility] )    Chronic venous hypertension, right    Essential hypertension    Chronic atrial fibrillation    Morbid obesity (HCC)    Acute on chronic diastolic congestive heart failure (HCC)    Cardiomyopathy (HCC)    Diabetes, polyneuropathy (HCC)    GERD (gastroesophageal reflux disease)    Hyperlipidemia    Onychomycosis    Gallstones    Blood alkaline phosphatase increased compared with prior measurement    Localized edema    Diabetic ulcer of toe of left foot associated with type 2 diabetes mellitus, limited to breakdown of skin (HCC)    Peripheral vascular disease (HCC)    NALLELY (obstructive sleep apnea)    Moderate persistent asthma without complication    Type 2 diabetes mellitus with left diabetic foot ulcer (HCC)    Acute renal failure superimposed on stage 3 chronic kidney disease (HCC)    Skin ulcer of left foot with necrosis of muscle (HCC)    Biclonal gammopathy    Multiple myeloma not having achieved remission ([facility] )    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)    Cellulitis     Past Medical History:   Diagnosis Date    CHF (congestive heart failure) (HCC)     Chronic kidney disease     COPD (chronic obstructive pulmonary disease) (HCC)     Decubitus ulcer of heel     LAST ASSESSED [id number]    Diabetes mellitus (HCC)     History of varicose veins     Hypertension     Intermittent claudication (HCC)     Neuropathy     Seasonal allergies      Social History     Socioeconomic History    Marital status:      Spouse name: Not on file    Number of children: 1    Years of education: Not on file    Highest education level: Not on file   Occupational History    Occupation: RETIRED   Social Needs    Financial resource strain: Not on file    Food insecurity:     Worry: Not on file     Inability: Not on file    Transportation needs:     Medical: Not on file     Non-medical: Not on file   Tobacco Use    Smoking status: Never Smoker    Smokeless tobacco: Never Used   Substance and Sexual Activity    Alcohol use: Not Currently    Drug use: Not Currently    Sexual activity: Not Currently   Lifestyle    Physical activity:     Days per week: Not on file     Minutes per session: Not on file    Stress: Not on file   Relationships    Social connections:     Talks on phone: Not on file     Gets together: Not on file     Attends Sabianist service: Not on file     Active member of club or organization: Not on file     Attends meetings of clubs or organizations: Not on file     Relationship status: Not on file    Intimate partner violence:     Fear of current or ex partner: Not on file     Emotionally abused: Not on file     Physically abused: Not on file     Forced sexual activity: Not on file   Other Topics Concern    Not on file   Social History Narrative    DENIED [address]    FEELS SAFE AT HOME    LIVES WITH FAMILY - SISTER    NO LIVING WILL    POA IN EXISTENCE     SUPPORTIVE AND SAFE    One son, 2 granddaughters      Family History   Problem Relation Age of Onset    Other Mother         CARDIAC DISORDER     Diabetes Mother     Cancer Father     Other Family         CARDIAC DISORDER     Diabetes Family     Cancer Family     Mental illness Family     Kidney disease Sister      Past Surgical History:   Procedure Laterality Date    AMPUTATION ABOVE KNEE (AKA) Left 7/31/2019    Procedure: AMPUTATION ABOVE KNEE (AKA);   Surgeon: Nelli Jaramillo MD;  Location: [facility] OR;  Service: General    ANGIOPLASTY      W/ FLUOROSC ANGIOGRAPGY PERIPHERAL ARTERY ADDITIONAL  LAST ASSESSED [id number]    ANGIOPLASTY / STENTING FEMORAL      TANSCATH INTRAVASCULAR STENT PLACEMENT PERCUTANEOUS FEMORAL     CT BONE MARROW BIOPSY AND ASPIRATION  8/9/2019    FULL THICKNESS SKIN GRAFT      TENDON REPAIR      TOE AMPUTATION Left 12/27/2018 Procedure: AMPUTATION left 4th TOE;  Surgeon: Eve Rodrigez DPM;  Location: [facility];  Service: Podiatry       Current Outpatient Medications:     acyclovir (ZOVIRAX) 400 MG tablet, Take 1 tablet (400 mg total) by mouth daily, Disp: 30 tablet, Rfl: 3    albuterol (PROVENTIL HFA,VENTOLIN HFA) 90 mcg/act inhaler, Inhale 2 puffs every 6 (six) hours as needed for wheezing, Disp: 1 Inhaler, Rfl: 0    Alcohol Swabs (ALCOHOL PREP) 70 % PADS, , Disp: , Rfl: 0    allopurinol (ZYLOPRIM) 300 mg tablet, TAKE 1 TABLET BY MOUTH DAILY, Disp: 90 tablet, Rfl: 1    ammonium lactate (LAC-HYDRIN) 12 % lotion, , Disp: , Rfl: 99    atorvastatin (LIPITOR) 20 mg tablet, TAKE 1 TABLET BY MOUTH ONCE DAILY, Disp: 90 tablet, Rfl: 0    BD INSULIN SYRINGE U/F 31G X 5/16" 0 5 ML MISC, USE AS DIRECTED WITH NOVOLIN 70/30, Disp: , Rfl: 0    BD PEN NEEDLE HILARIO U/F 32G X 4 MM MISC, by Other route 3 (three) times a day, Disp: 300 each, Rfl: 1    fluticasone-salmeterol (ADVAIR DISKUS, WIXELA INHUB) 250-50 mcg/dose inhaler, Inhale 1 puff 2 (two) times a day Rinse mouth after use , Disp: 1 Inhaler, Rfl: 5    insulin aspart (NOVOLOG FLEXPEN) 100 Units/mL injection pen, Inject 30 Units under the skin 3 (three) times a day with meals, Disp: 15 pen, Rfl: 1    insulin glargine (LANTUS) 100 units/mL subcutaneous injection, Inject 18 Units under the skin daily at bedtime, Disp: 10 mL, Rfl: 0    metoprolol tartrate (LOPRESSOR) 25 mg tablet, TAKE 1 TABLET BY MOUTH EVERY 12 HOURS, Disp: 180 tablet, Rfl: 3    torsemide (DEMADEX) 10 mg tablet, Take 1 tablet (10 mg total) by mouth 2 (two) times a day, Disp: , Rfl:     warfarin (COUMADIN) 7 5 mg tablet, 7 5 mg on August 12th and 13th then INR on August 14th, Disp: 30 tablet, Rfl: 0    bortezomib (VELCADE), Infuse into a venous catheter once, Disp: , Rfl:     clotrimazole (LOTRIMIN) 1 % cream, Apply topically 2 (two) times a day, Disp: 30 g, Rfl: 0    Insulin Pen Needle 31G X 5 MM MISC, by Does not apply route 2 (two) times a day for 50 days, Disp: 100 each, Rfl: 0    metFORMIN (GLUCOPHAGE) 1000 MG tablet, , Disp: , Rfl:   No current facility-administered medications for this visit  Allergies   Allergen Reactions    Latex Rash       Labs:not applicable  Imaging: No results found  Review of Systems:  Review of Systems   All other systems reviewed and are negative  Physical Exam:  /80 (BP Location: Left arm, Patient Position: Sitting, Cuff Size: Large)   Pulse 61   Ht 6' 3" (1 905 m)   BMI 42 99 kg/m²   Physical Exam   Constitutional: He is oriented to person, place, and time  He appears well-developed and well-nourished  HENT:   Head: Normocephalic and atraumatic  Eyes: Pupils are equal, round, and reactive to light  Conjunctivae are normal    Neck: Normal range of motion  Neck supple  Cardiovascular: Normal rate and normal heart sounds  Pulmonary/Chest: Effort normal and breath sounds normal    Neurological: He is alert and oriented to person, place, and time  Skin: Skin is warm and dry  Psychiatric: He has a normal mood and affect  Vitals reviewed  Discussion/Summary:I will continue the patient's present medical regimen  The patient appears well compensated  I have asked the patient to call if there is a problem in the interim otherwise I will see the patient in six months time

## 2019-10-07 RX ORDER — AMMONIUM LACTATE 12 G/100G
LOTION TOPICAL
Refills: 99 | COMMUNITY
Start: 2019-09-30 | End: 2020-05-14

## 2019-10-07 RX ORDER — DEXAMETHASONE 4 MG/1
20 TABLET ORAL ONCE
Status: CANCELLED
Start: 2019-10-08

## 2019-10-07 RX ORDER — UBIQUINOL 100 MG
CAPSULE ORAL
Refills: 0 | Status: ON HOLD | COMMUNITY
Start: 2019-09-18

## 2019-10-07 RX ORDER — SODIUM CHLORIDE 9 MG/ML
20 INJECTION, SOLUTION INTRAVENOUS ONCE
Status: CANCELLED
Start: 2019-10-08

## 2019-10-08 ENCOUNTER — HOSPITAL ENCOUNTER (OUTPATIENT)
Dept: INFUSION CENTER | Facility: HOSPITAL | Age: 69
Discharge: HOME | End: 2019-10-08
Attending: INTERNAL MEDICINE
Payer: COMMERCIAL

## 2019-10-08 ENCOUNTER — TELEPHONE (OUTPATIENT)
Dept: OTHER | Facility: OTHER | Age: 69
End: 2019-10-08

## 2019-10-08 ENCOUNTER — CONSULT (OUTPATIENT)
Dept: VASCULAR SURGERY | Facility: CLINIC | Age: 69
End: 2019-10-08
Payer: COMMERCIAL

## 2019-10-08 VITALS
SYSTOLIC BLOOD PRESSURE: 146 MMHG | HEART RATE: 72 BPM | HEIGHT: 75 IN | DIASTOLIC BLOOD PRESSURE: 72 MMHG | TEMPERATURE: 98.5 F | WEIGHT: 315 LBS | BODY MASS INDEX: 39.17 KG/M2

## 2019-10-08 VITALS
RESPIRATION RATE: 18 BRPM | HEART RATE: 64 BPM | DIASTOLIC BLOOD PRESSURE: 72 MMHG | TEMPERATURE: 98.6 F | WEIGHT: 315 LBS | SYSTOLIC BLOOD PRESSURE: 157 MMHG | BODY MASS INDEX: 39.17 KG/M2 | HEIGHT: 75 IN | OXYGEN SATURATION: 95 %

## 2019-10-08 DIAGNOSIS — I73.9 PERIPHERAL VASCULAR DISEASE (HCC): Primary | ICD-10-CM

## 2019-10-08 DIAGNOSIS — S78.112A ABOVE KNEE AMPUTATION OF LEFT LOWER EXTREMITY (HCC): ICD-10-CM

## 2019-10-08 DIAGNOSIS — I87.301 CHRONIC VENOUS HYPERTENSION, RIGHT: ICD-10-CM

## 2019-10-08 DIAGNOSIS — E66.01 MORBID OBESITY (HCC): ICD-10-CM

## 2019-10-08 DIAGNOSIS — C90.00 MULTIPLE MYELOMA NOT HAVING ACHIEVED REMISSION (HCC): Primary | ICD-10-CM

## 2019-10-08 PROBLEM — I83.029: Status: RESOLVED | Noted: 2017-02-16 | Resolved: 2019-10-08

## 2019-10-08 PROBLEM — L97.929: Status: RESOLVED | Noted: 2017-02-16 | Resolved: 2019-10-08

## 2019-10-08 PROCEDURE — 99213 OFFICE O/P EST LOW 20 MIN: CPT | Performed by: NURSE PRACTITIONER

## 2019-10-08 PROCEDURE — 96401 CHEMO ANTI-NEOPL SQ/IM: CPT

## 2019-10-08 PROCEDURE — 96365 THER/PROPH/DIAG IV INF INIT: CPT

## 2019-10-08 RX ORDER — DEXAMETHASONE 4 MG/1
20 TABLET ORAL ONCE
Status: COMPLETED | OUTPATIENT
Start: 2019-10-08 | End: 2019-10-08

## 2019-10-08 RX ORDER — SODIUM CHLORIDE 9 MG/ML
20 INJECTION, SOLUTION INTRAVENOUS ONCE
Status: COMPLETED | OUTPATIENT
Start: 2019-10-08 | End: 2019-10-08

## 2019-10-08 RX ADMIN — DEXAMETHASONE 20 MG: 4 TABLET ORAL at 14:20

## 2019-10-08 RX ADMIN — BORTEZOMIB 3.5 MG: 3.5 INJECTION, POWDER, LYOPHILIZED, FOR SOLUTION INTRAVENOUS; SUBCUTANEOUS at 15:00

## 2019-10-08 RX ADMIN — IRON SUCROSE 200 MG: 20 INJECTION, SOLUTION INTRAVENOUS at 14:17

## 2019-10-08 RX ADMIN — SODIUM CHLORIDE 20 ML/HR: 9 INJECTION, SOLUTION INTRAVENOUS at 14:10

## 2019-10-08 NOTE — PATIENT INSTRUCTIONS
A 81year-old morbidly obese male with HTN, HLD, DM, CKD, AFib on Coumadin, chronic heart failure, peripheral arterial disease, chronic venous insufficiency, chronic ulcer left heel status post left above-the-knee amputation 7/31/19 by Dr Edwardo Stafford, multiple myeloma who presents to the office for vascular evaluation  -Lower extremity arterial duplex 7/29/19 showed right fem-pop diffuse disease no focal stenosis STALIN 1 23/230/62  -He has 1+ pitting edema w/ chronic venous skin changes hyperpigmentations and skin fibrosis of the right lower extremity   -Currently wearing NIK sock, needs higher level of compression, Rx 20-30mmHg compression script given  Stockings to be worn daily and removed at night   -Recommended daily moisturizers to maintain skin integrity  Aquaphor or Eucerin applied daily   -Sits in wheel chair all day   Needs periodic leg elevation and leg exercise (calf pump exercises to help manage edema)  -No ambition to get fitted for prosthesis  -Will plan for LEAD in 1 year and office visit in 1 year for risk factor modification

## 2019-10-08 NOTE — PROGRESS NOTES
Pt arrived via w/c for Velcade and Venofer   Pivoted to scale bed with walker  Moves well  Weight obtained, PIV inserted Rt forearm, NSS to kvo, Venofer infusing  Decadron PO given  Waiting for Velcade

## 2019-10-08 NOTE — PROGRESS NOTES
Venofer infusion tolerated well  Pt offered no complaints  PIV dc'd , Pt awaiting for [facility]

## 2019-10-08 NOTE — PROGRESS NOTES
Assessment/Plan:  51-year-old morbidly obese male with HTN, HLD, DM, CKD, AFib on Coumadin, chronic heart failure, peripheral arterial disease, chronic venous insufficiency, chronic ulcer left heel status post left above-the-knee amputation 7/31/19 by Dr Namita Wynn, multiple myeloma who presents to the office for vascular evaluation  Referred by Dr Renate Roberts  -Lower extremity arterial duplex 7/29/19 showed right fem-pop diffuse disease no focal stenosis STALIN 1 23/230/62  -He has 1+ pitting edema w/ chronic venous skin changes hyperpigmentations and skin fibrosis of the right lower extremity   -Currently wearing NIK sock, needs higher level of compression, Rx 20-30mmHg compression script given  Stockings to be worn daily and removed at night   -Recommended daily moisturizers to maintain skin integrity  Aquaphor or Eucerin applied daily   -Sits in wheel chair all day  Needs periodic leg elevation and leg exercise (calf pump exercises to help manage edema)  -No ambition to get fitted for prosthesis  -Will plan for LEAD in 1 year and office visit in 1 year for risk factor modification          Problem List Items Addressed This Visit        Cardiovascular and Mediastinum    Chronic venous hypertension, right    Relevant Orders    Compression Stocking    Peripheral vascular disease ([address] ) - Primary    Relevant Orders    VAS lower limb arterial duplex, complete bilateral    Compression Stocking       Other    Morbid obesity ([address] )    Above knee amputation of left lower extremity ([address] )    Relevant Orders    VAS lower limb arterial duplex, complete bilateral            Subjective:      Patient ID: Emely Garcia is a 81 y o  male  New patient, referred by PCP for PVD  Patient reports RLE swelling and redness x 3 months  He has been wearing compression daily  He is also s/p L AKA by general surgery, 7/31/19 d/t non-healing wound  Patient is non-ambulatory and has not been fitted for prosthesis       HPI  51-year-old morbidly obese male with HTN, HLD, DM, CKD, AFib on Coumadin, chronic heart failure, peripheral arterial disease, chronic venous insufficiency, chronic ulcer left heel status post left above-the-knee amputation 7/31/19 by Dr Jade Monroy, multiple myeloma who presents to the office for vascular evaluation  Referred by Dr Sandy Limb  Patient in a wheel chair  His left AKA stump has heeled completely  [facility] resides at [facility]  He wore a stump  twice and states he does not want to use  or be fitted for prosthesis  States he needs time  His concern is right leg now, he fears amputation  His only exercise is getting up out of bed to the bathroom and wheelchair  Then sits in chair for whole day  Right leg with chronic venous skin changes and scabs present  No open ulcerations or weeping  No active cellulitis, just chronic skin changes  He is wearing NIK stocking today though reports only wearing its on 3 occasions because they are hard to remove  He denies any pain in the right leg  The following portions of the patient's history were reviewed and updated as appropriate: allergies, current medications, past family history, past medical history, past social history, past surgical history and problem list   Review of systems reviewed    Review of Systems   Constitutional: Negative  HENT: Negative  Eyes: Negative  Respiratory: Positive for apnea and shortness of breath  Cardiovascular: Positive for leg swelling  Irregular heart rate   Gastrointestinal: Negative  Endocrine: Positive for polyphagia and polyuria  Genitourinary: Positive for frequency  Musculoskeletal: Positive for gait problem  Skin: Positive for rash and wound  ulcer   Allergic/Immunologic: Negative  Hematological: Negative  Psychiatric/Behavioral: Negative  Objective:    I have reviewed and made appropriate changes to the review of systems input by the medical assistant      Vitals:    10/08/19 0905   BP: 146/72   BP Location: Right arm   Patient Position: Sitting   Pulse: 72   Temp: 98 5 °F (36 9 °C)   TempSrc: Tympanic   Weight: (!) 156 kg (345 lb)   Height: 6' 3" (1 905 m)       Patient Active Problem List   Diagnosis    [facility] ulcer [facility] )    Diabetes mellitus type 2, uncontrolled ([facility] )    Chronic venous hypertension, right    Essential hypertension    Chronic atrial fibrillation    Morbid obesity (HCC)    Acute on chronic diastolic congestive heart failure (HCC)    Cardiomyopathy (HCC)    Diabetes, polyneuropathy (HCC)    GERD (gastroesophageal reflux disease)    Hyperlipidemia    Onychomycosis    Gallstones    Blood alkaline phosphatase increased compared with prior measurement    Localized edema    Diabetic ulcer of toe of left foot associated with type 2 diabetes mellitus, limited to breakdown of skin (HCC)    Peripheral vascular disease (HCC)    NALLELY (obstructive sleep apnea)    Moderate persistent asthma without complication    Type 2 diabetes mellitus with left diabetic foot ulcer (HCC)    Acute renal failure superimposed on stage 3 chronic kidney disease (HCC)    Skin ulcer of left foot with necrosis of muscle (HCC)    Biclonal gammopathy    Multiple myeloma not having achieved remission ([facility] )    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)    Cellulitis       Past Surgical History:   Procedure Laterality Date    AMPUTATION ABOVE KNEE (AKA) Left 7/31/2019    Procedure: AMPUTATION ABOVE KNEE (AKA);   Surgeon: Lakisha Ashley MD;  Location:  MAIN OR;  Service: General    ANGIOPLASTY      W/ FLUOROSC ANGIOGRAPGY PERIPHERAL ARTERY ADDITIONAL  LAST ASSESSED [id number]    ANGIOPLASTY / STENTING FEMORAL      TANSCATH INTRAVASCULAR STENT PLACEMENT PERCUTANEOUS FEMORAL     CT BONE MARROW BIOPSY AND ASPIRATION  8/9/2019    FULL THICKNESS SKIN GRAFT      TENDON REPAIR      TOE AMPUTATION Left 12/27/2018    Procedure: AMPUTATION left 4th TOE;  Surgeon: Valentine Mendez Nikki Zuniga DPM;  Location: [facility] OR;  Service: Podiatry       Family History   Problem Relation Age of Onset    Other Mother         CARDIAC DISORDER     Diabetes Mother     Cancer Father     Other Family         CARDIAC DISORDER     Diabetes Family     Cancer Family     Mental illness Family     Kidney disease Sister        Social History     Socioeconomic History    Marital status:      Spouse name: Not on file    Number of children: 1    Years of education: Not on file    Highest education level: Not on file   Occupational History    Occupation: RETIRED   Social Needs    Financial resource strain: Not on file    Food insecurity:     Worry: Not on file     Inability: Not on file    Transportation needs:     Medical: Not on file     Non-medical: Not on file   Tobacco Use    Smoking status: Never Smoker    Smokeless tobacco: Never Used   Substance and Sexual Activity    Alcohol use: Not Currently    Drug use: Not Currently    Sexual activity: Not Currently   Lifestyle    Physical activity:     Days per week: Not on file     Minutes per session: Not on file    Stress: Not on file   Relationships    Social connections:     Talks on phone: Not on file     Gets together: Not on file     Attends Jewish service: Not on file     Active member of club or organization: Not on file     Attends meetings of clubs or organizations: Not on file     Relationship status: Not on file    Intimate partner violence:     Fear of current or ex partner: Not on file     Emotionally abused: Not on file     Physically abused: Not on file     Forced sexual activity: Not on file   Other Topics Concern    Not on file   Social History Narrative    DENIED [address]    FEELS SAFE AT HOME    LIVES WITH FAMILY - SISTER    NO LIVING WILL    POA IN EXISTENCE     SUPPORTIVE AND SAFE    One son, 2 granddaughters Allergies   Allergen Reactions    Latex Rash         Current Outpatient Medications:     acyclovir (ZOVIRAX) 400 MG tablet, Take 1 tablet (400 mg total) by mouth daily, Disp: 30 tablet, Rfl: 3    albuterol (PROVENTIL HFA,VENTOLIN HFA) 90 mcg/act inhaler, Inhale 2 puffs every 6 (six) hours as needed for wheezing, Disp: 1 Inhaler, Rfl: 0    Alcohol Swabs (ALCOHOL PREP) 70 % PADS, , Disp: , Rfl: 0    allopurinol (ZYLOPRIM) 300 mg tablet, TAKE 1 TABLET BY MOUTH DAILY, Disp: 90 tablet, Rfl: 1    ammonium lactate (LAC-HYDRIN) 12 % lotion, , Disp: , Rfl: 99    atorvastatin (LIPITOR) 20 mg tablet, TAKE 1 TABLET BY MOUTH ONCE DAILY, Disp: 90 tablet, Rfl: 0    BD INSULIN SYRINGE U/F 31G X 5/16" 0 5 ML MISC, USE AS DIRECTED WITH NOVOLIN 70/30, Disp: , Rfl: 0    BD PEN NEEDLE HILARIO U/F 32G X 4 MM MISC, by Other route 3 (three) times a day, Disp: 300 each, Rfl: 1    bortezomib (VELCADE), Infuse into a venous catheter once, Disp: , Rfl:     clotrimazole (LOTRIMIN) 1 % cream, Apply topically 2 (two) times a day, Disp: 30 g, Rfl: 0    fluticasone-salmeterol (ADVAIR DISKUS, WIXELA INHUB) 250-50 mcg/dose inhaler, Inhale 1 puff 2 (two) times a day Rinse mouth after use , Disp: 1 Inhaler, Rfl: 5    insulin aspart (NOVOLOG FLEXPEN) 100 Units/mL injection pen, Inject 30 Units under the skin 3 (three) times a day with meals, Disp: 15 pen, Rfl: 1    insulin glargine (LANTUS) 100 units/mL subcutaneous injection, Inject 18 Units under the skin daily at bedtime, Disp: 10 mL, Rfl: 0    Insulin Pen Needle 31G X 5 MM MISC, by Does not apply route 2 (two) times a day for 50 days, Disp: 100 each, Rfl: 0    metFORMIN (GLUCOPHAGE) 1000 MG tablet, , Disp: , Rfl:     metoprolol tartrate (LOPRESSOR) 25 mg tablet, TAKE 1 TABLET BY MOUTH EVERY 12 HOURS, Disp: 180 tablet, Rfl: 3    torsemide (DEMADEX) 10 mg tablet, Take 1 tablet (10 mg total) by mouth 2 (two) times a day, Disp: , Rfl:     warfarin (COUMADIN) 7 5 mg tablet, 7 5 mg on August 12th and 13th then INR on August 14th, Disp: 30 tablet, Rfl: 0    /72 (BP Location: Right arm, Patient Position: Sitting)   Pulse 72   Temp 98 5 °F (36 9 °C) (Tympanic)   Ht 6' 3" (1 905 m)   Wt (!) 156 kg (345 lb)   BMI 43 12 kg/m²          Physical Exam   Constitutional: He is oriented to person, place, and time  Morbidly obese, dishoveled   HENT:   Head: Normocephalic and atraumatic  Eyes: EOM are normal    glasses   Neck: Neck supple  Cardiovascular: An irregular rhythm present  Pulses:       Dorsalis pedis pulses are 0 on the right side  Posterior tibial pulses are 0 on the right side  L AKA - stump healed completely   R pedal pulses non palpable 2/2 edema and skin fibrosis  Excellent DP/PT signal with doppler    Pulmonary/Chest: Effort normal and breath sounds normal    Abdominal: Soft  Abdomen obese   Musculoskeletal: He exhibits edema  Non ambulatory, wheelchair   Neurological: He is alert and oriented to person, place, and time  Skin: Skin is warm  Chronic venous skin changes RLE w/ skin fibrosis and edema  Multiple small scabs present  No weeping    Psychiatric: His behavior is normal    Nursing note and vitals reviewed

## 2019-10-08 NOTE — PLAN OF CARE
Problem: Potential for Falls  Goal: Patient will remain free of falls  Description  INTERVENTIONS:  - Assess patient frequently for physical needs  -  Identify cognitive and physical deficits and behaviors that affect risk of falls  -  [place] fall precautions as indicated by assessment   - Educate patient/family on patient safety including physical limitations  - Instruct patient to call for assistance with activity based on assessment  - Modify environment to reduce risk of injury  - Consider OT/PT consult to assist with strengthening/mobility  Outcome: Progressing     Problem: Knowledge Deficit  Goal: Patient/family/caregiver demonstrates understanding of disease process, treatment plan, medications, and discharge instructions  Description  Complete learning assessment and assess knowledge base    Interventions:  - Provide teaching at level of understanding  - Provide teaching via preferred learning methods  Outcome: Progressing

## 2019-10-09 ENCOUNTER — TELEPHONE (OUTPATIENT)
Dept: FAMILY MEDICINE CLINIC | Facility: CLINIC | Age: 69
End: 2019-10-09

## 2019-10-09 ENCOUNTER — OFFICE VISIT (OUTPATIENT)
Dept: CARDIOLOGY CLINIC | Facility: CLINIC | Age: 69
End: 2019-10-09
Payer: COMMERCIAL

## 2019-10-09 VITALS
BODY MASS INDEX: 42.99 KG/M2 | HEART RATE: 61 BPM | SYSTOLIC BLOOD PRESSURE: 140 MMHG | DIASTOLIC BLOOD PRESSURE: 80 MMHG | HEIGHT: 75 IN

## 2019-10-09 DIAGNOSIS — I50.32 CHRONIC DIASTOLIC CONGESTIVE HEART FAILURE (HCC): ICD-10-CM

## 2019-10-09 DIAGNOSIS — I10 ESSENTIAL HYPERTENSION: ICD-10-CM

## 2019-10-09 DIAGNOSIS — E78.00 PURE HYPERCHOLESTEROLEMIA: ICD-10-CM

## 2019-10-09 DIAGNOSIS — I48.20 CHRONIC ATRIAL FIBRILLATION (HCC): Primary | ICD-10-CM

## 2019-10-09 PROCEDURE — 99214 OFFICE O/P EST MOD 30 MIN: CPT | Performed by: INTERNAL MEDICINE

## 2019-10-09 PROCEDURE — 93000 ELECTROCARDIOGRAM COMPLETE: CPT | Performed by: INTERNAL MEDICINE

## 2019-10-09 NOTE — TELEPHONE ENCOUNTER
Called from CHAPARRITA at [facility] stating pt sugar was 445 this morning at 10:45, pt seems to be doing fine, just an fyi

## 2019-10-09 NOTE — TELEPHONE ENCOUNTER
Tiger Text Dr Anna Miller @ [address] [place] [address] Drive [place]/ [address] [facility] Maria G/1950/Pt left facility and did not take medication

## 2019-10-10 ENCOUNTER — TRANSCRIBE ORDERS (OUTPATIENT)
Dept: FAMILY MEDICINE CLINIC | Facility: CLINIC | Age: 69
End: 2019-10-10

## 2019-10-10 DIAGNOSIS — C90.00 MULTIPLE MYELOMA NOT HAVING ACHIEVED REMISSION (HCC): Primary | ICD-10-CM

## 2019-10-10 NOTE — TELEPHONE ENCOUNTER
Lexie Marsh at [facility] is going to fax us his sugars for the last 2 weeks that includes lower sugars as well

## 2019-10-11 ENCOUNTER — TELEPHONE (OUTPATIENT)
Dept: HEMATOLOGY ONCOLOGY | Facility: CLINIC | Age: 69
End: 2019-10-11

## 2019-10-11 DIAGNOSIS — C90.00 MULTIPLE MYELOMA NOT HAVING ACHIEVED REMISSION (HCC): ICD-10-CM

## 2019-10-11 RX ORDER — DEXAMETHASONE 4 MG/1
20 TABLET ORAL ONCE
Status: CANCELLED
Start: 2019-10-15

## 2019-10-11 RX ORDER — DEXAMETHASONE 4 MG/1
20 TABLET ORAL ONCE
Status: CANCELLED
Start: 2020-01-06

## 2019-10-11 RX ORDER — SODIUM CHLORIDE 9 MG/ML
20 INJECTION, SOLUTION INTRAVENOUS ONCE
Status: CANCELLED
Start: 2019-10-15

## 2019-10-11 RX ORDER — DEXAMETHASONE 4 MG/1
20 TABLET ORAL ONCE
Status: CANCELLED
Start: 2020-01-20

## 2019-10-11 RX ORDER — DEXAMETHASONE 4 MG/1
20 TABLET ORAL ONCE
Status: CANCELLED
Start: 2020-01-27

## 2019-10-11 RX ORDER — DEXAMETHASONE 4 MG/1
20 TABLET ORAL ONCE
Status: CANCELLED
Start: 2020-01-13

## 2019-10-11 NOTE — TELEPHONE ENCOUNTER
Willi Munoz from [facility] called in to verify [facility] 10/14/2019 appointment  I verified date, time and location

## 2019-10-14 ENCOUNTER — OFFICE VISIT (OUTPATIENT)
Dept: HEMATOLOGY ONCOLOGY | Facility: HOSPITAL | Age: 69
End: 2019-10-14
Payer: COMMERCIAL

## 2019-10-14 VITALS
RESPIRATION RATE: 16 BRPM | TEMPERATURE: 98.2 F | BODY MASS INDEX: 39.17 KG/M2 | HEART RATE: 65 BPM | HEIGHT: 75 IN | OXYGEN SATURATION: 96 % | WEIGHT: 315 LBS | SYSTOLIC BLOOD PRESSURE: 132 MMHG | DIASTOLIC BLOOD PRESSURE: 68 MMHG

## 2019-10-14 DIAGNOSIS — C90.00 MULTIPLE MYELOMA NOT HAVING ACHIEVED REMISSION (HCC): Primary | ICD-10-CM

## 2019-10-14 PROCEDURE — 99214 OFFICE O/P EST MOD 30 MIN: CPT | Performed by: NURSE PRACTITIONER

## 2019-10-14 NOTE — PROGRESS NOTES
Hematology/Oncology Outpatient Follow- up Note  Aleksadnra Bella 81 y o  male MRN: @ Encounter: [id number]        Date:  10/14/2019    Presenting Complaint/Diagnosis : Biclonal gammopathy with IgG Kappa    HPI:  The patient was admitted to the hospital with wound infection  Lexi Walton has a history of DM, HTN, and several other comorbid conditions   He seems to have had a slightly elevated creatinine for about the past year, seems to be slightly increasing more recently  He has also been anemic for quiet some time  Skeletal survey was negative but we did a bone marrow biopsy which showed plasma cell neoplasm consistent with myeloma so he was referred to see us  Previous Hematologic/ Oncologic History:       Multiple myeloma not having achieved remission ([facility] )    9/16/2019 Initial Diagnosis     Multiple myeloma not having achieved remission ([facility] )      9/24/2019 -  Chemotherapy     iron sucrose (VENOFER) injection 200 mg, 200 mg (100 % of original dose 200 mg), Intravenous, Once, 1 of 1 cycle  Dose modification: 200 mg (original dose 200 mg, Cycle 1, Reason: Other (See Comments))  Administration: 200 mg (9/24/2019)  bortezomib (VELCADE) subcutaneous injection 3 5 mg, 1 3 mg/m2 = 3 5 mg (81 3 % of original dose 1 6 mg/m2), Subcutaneous, Once, 1 of 6 cycles  Dose modification: 1 3 mg/m2 (original dose 1 6 mg/m2, Cycle 1, Reason: Dose Not Tolerated)  Administration: 3 5 mg (9/24/2019), 3 5 mg (10/1/2019), 3 5 mg (10/8/2019)         Current Hematologic/ Oncologic Treatment:      Velcade and Decadron    Interval History:      The patient returns for follow-up visit  He is due for day 22 of cycle 1 on 10/15/19  At this point he has tolerated his 1st 3 doses of Velcade well and really has no complaints  Denies chest pain, shortness of breath, nausea, vomiting, diarrhea, bleeding/bruising, and fevers/infection  Blood counts remains within acceptable range  Creatinine is 1 2 and calcium is 8 4      Test Results:    Imaging: No results found  Labs:   Lab Results   Component Value Date    WBC 6 1 08/16/2019    HGB 10 6 08/16/2019    HCT 39 08/16/2019    MCV 96 08/16/2019     08/16/2019     Lab Results   Component Value Date     01/15/2018    K 4 3 08/16/2019     08/16/2019    CO2 104 08/16/2019    ANIONGAP 11 12/22/2015    BUN 64 08/16/2019    CREATININE 1 4 08/16/2019    GLUCOSE 139 12/22/2015    GLUF 141 (H) 02/26/2019    CALCIUM 9 1 08/16/2019    AST 21 08/08/2019    ALT 19 08/08/2019    ALKPHOS 150 (H) 08/08/2019    PROT 7 1 01/15/2018    BILITOT 0 6 01/15/2018    EGFR 51 08/16/2019         Lab Results   Component Value Date    SPEP  08/04/2019     The SPEP shows a biclonal gammopathy  Immunofixation to be performed  Reviewed by: José Antonio Valdez MD ([id number]) **Electronic Signature**    UPEP  08/04/2019     The UPEP shows non-selective proteinuria  The UPEP shows a monoclonal gammopathy  Immunofixation to be performed  Reviewed by: Macario Calero MD **Electronic Signature**       No results found for: PSA    No results found for: CEA    No results found for:     No results found for: AFP    Lab Results   Component Value Date    IRON 51 (L) 08/05/2019    TIBC 348 08/05/2019    FERRITIN 134 08/05/2019       No results found for: [id number]      ROS: As stated in the history of present illness otherwise his 14 point review of systems today was negative        Active Problems:   Patient Active Problem List   Diagnosis    Diabetic foot ulcer ([facility] )    Diabetes mellitus type 2, uncontrolled ([facility] )    Chronic venous hypertension, right    Essential hypertension    Chronic atrial fibrillation    Morbid obesity ([facility] )    Acute on chronic diastolic congestive heart failure (HCC)    Cardiomyopathy ([facility] )    Diabetes, polyneuropathy ([facility] )    GERD (gastroesophageal reflux disease)    Hyperlipidemia    Onychomycosis    Gallstones    Blood alkaline phosphatase increased compared with prior measurement    Localized edema    Diabetic ulcer of toe of left foot associated with type 2 diabetes mellitus, limited to breakdown of skin (HCC)    Peripheral vascular disease (HCC)    NALLELY (obstructive sleep apnea)    Moderate persistent asthma without complication    Type 2 diabetes mellitus with left diabetic foot ulcer (HCC)    Acute renal failure superimposed on stage 3 chronic kidney disease (HCC)    Skin ulcer of left foot with necrosis of muscle (HCC)    Biclonal gammopathy    Multiple myeloma not having achieved remission ([facility] )    Above knee amputation of left lower extremity ([facility] )    Cellulitis       Past Medical History:   Past Medical History:   Diagnosis Date    CHF (congestive heart failure) (HCC)     Chronic kidney disease     [facility]ch[facility] obstructive pulmonary disease) [facility]     Decubitus ulcer of heel     LAST ASSESSED [id number]    Diabetes mellitus ([facility] )     History of varicose veins     Hypertension     Intermittent claudication (HCC)     Neuropathy     Seasonal allergies        Surgical History:   Past Surgical History:   Procedure Laterality Date    AMPUTATION ABOVE KNEE (AKA) Left 7/31/2019    Procedure: AMPUTATION ABOVE KNEE (AKA);   Surgeon: Vickie Dennis MD;  Location: [facility];  Service: General    ANGIOPLASTY      W/ FLUOROSC ANGIOGRAPGY PERIPHERAL ARTERY ADDITIONAL  LAST ASSESSED [id number]    ANGIOPLASTY / STENTING FEMORAL      TANSCATH INTRAVASCULAR STENT PLACEMENT PERCUTANEOUS FEMORAL     CT BONE MARROW BIOPSY AND ASPIRATION  8/9/2019    FULL THICKNESS SKIN GRAFT      TENDON REPAIR      TOE AMPUTATION Left 12/27/2018    Procedure: AMPUTATION left 4th TOE;  Surgeon: Sienna Gordillo DPM;  Location: [facility];  Service: Podiatry       Family History:    Family History   Problem Relation Age of Onset    Other Mother         CARDIAC DISORDER     Diabetes Mother     Cancer Father     Other Family         CARDIAC DISORDER     Diabetes Family     Cancer Family     Mental illness Family     Kidney disease Sister        Cancer-related family history includes Cancer in his family and father      Social History:   Social History     Socioeconomic History    Marital status:      Spouse name: Not on file    Number of children: 1    Years of education: Not on file    Highest education level: Not on file   Occupational History    Occupation: RETIRED   Social Needs    Financial resource strain: Not on file    Food insecurity:     Worry: Not on file     Inability: Not on file   Enlightened Lifestyle needs:     Medical: Not on file     Non-medical: Not on file   Tobacco Use    Smoking status: Never Smoker    Smokeless tobacco: Never Used   Substance and Sexual Activity    Alcohol use: Not Currently    Drug use: Not Currently    Sexual activity: Not Currently   Lifestyle    Physical activity:     Days per week: Not on file     Minutes per session: Not on file    Stress: Not on file   Relationships    Social connections:     Talks on phone: Not on file     Gets together: Not on file     Attends Yazdanism service: Not on file     Active member of club or organization: Not on file     Attends meetings of clubs or organizations: Not on file     Relationship status: Not on file    Intimate partner violence:     Fear of current or ex partner: Not on file     Emotionally abused: Not on file     Physically abused: Not on file     Forced sexual activity: Not on file   Other Topics Concern    Not on file   Social History Narrative    DENIED [address]    FEELS SAFE AT HOME    LIVES WITH FAMILY - SISTER    NO LIVING WILL    POA IN EXISTENCE     SUPPORTIVE AND SAFE    One son, 2 granddaughters       Current Medications:   Current Outpatient Medications   Medication Sig Dispense Refill    acyclovir (ZOVIRAX) 400 MG tablet Take 1 tablet (400 mg total) by mouth daily 30 tablet 3    albuterol (PROVENTIL HFA,VENTOLIN HFA) 90 mcg/act inhaler Inhale 2 puffs every 6 (six) hours as needed for wheezing 1 Inhaler 0    Alcohol Swabs (ALCOHOL PREP) 70 % PADS   0    allopurinol (ZYLOPRIM) 300 mg tablet TAKE 1 TABLET BY MOUTH DAILY 90 tablet 1    ammonium lactate (LAC-HYDRIN) 12 % lotion   99    atorvastatin (LIPITOR) 20 mg tablet TAKE 1 TABLET BY MOUTH ONCE DAILY 90 tablet 0    BD INSULIN SYRINGE U/F 31G X 5/16" 0 5 ML MISC USE AS DIRECTED WITH NOVOLIN 70/30  0    BD PEN NEEDLE HILARIO U/F 32G X 4 MM MISC by Other route 3 (three) times a day 300 each 1    bortezomib (VELCADE) Infuse into a venous catheter once      clotrimazole (LOTRIMIN) 1 % cream Apply topically 2 (two) times a day 30 g 0    fluticasone-salmeterol (ADVAIR DISKUS, WIXELA INHUB) 250-50 mcg/dose inhaler Inhale 1 puff 2 (two) times a day Rinse mouth after use  1 Inhaler 5    insulin aspart (NOVOLOG FLEXPEN) 100 Units/mL injection pen Inject 30 Units under the skin 3 (three) times a day with meals 15 pen 1    insulin glargine (LANTUS) 100 units/mL subcutaneous injection Inject 18 Units under the skin daily at bedtime 10 mL 0    metFORMIN (GLUCOPHAGE) 1000 MG tablet       metoprolol tartrate (LOPRESSOR) 25 mg tablet TAKE 1 TABLET BY MOUTH EVERY 12 HOURS 180 tablet 3    torsemide (DEMADEX) 10 mg tablet Take 1 tablet (10 mg total) by mouth 2 (two) times a day      warfarin (COUMADIN) 7 5 mg tablet 7 5 mg on August 12th and 13th then INR on August 14th 30 tablet 0    Insulin Pen Needle 31G X 5 MM MISC by Does not apply route 2 (two) times a day for 50 days 100 each 0     No current facility-administered medications for this visit  Allergies: Allergies   Allergen Reactions    Latex Rash       Physical Exam:    Body surface area is 2 72 meters squared      Wt Readings from Last 3 Encounters:   10/14/19 (!) 150 kg (330 lb)   10/08/19 (!) 156 kg (343 lb 14 7 oz)   10/08/19 (!) 156 kg (345 lb)        Temp Readings from Last 3 Encounters:   10/14/19 98 2 °F (36 8 °C) (Tympanic)   10/08/19 98 6 °F (37 °C) (Tympanic)   10/08/19 98 5 °F (36 9 °C) (Tympanic)        BP Readings from Last 3 Encounters:   10/14/19 132/68   10/09/19 140/80   10/08/19 157/72         Pulse Readings from Last 3 Encounters:   10/14/19 65   10/09/19 61   10/08/19 64     @LASTSAO2(3)@      Physical Exam     Constitutional   General appearance: No acute distress, well appearing and well nourished  Eyes   Conjunctiva and lids: No swelling, erythema or discharge  Pupils and irises: Equal, round and reactive to light  Ears, Nose, Mouth, and Throat   External inspection of ears and nose: Normal     Nasal mucosa, septum, and turbinates: Normal without edema or erythema  Oropharynx: Normal with no erythema, edema, exudate or lesions  Pulmonary   Respiratory effort: No increased work of breathing or signs of respiratory distress  Auscultation of lungs: Clear to auscultation  Cardiovascular   Palpation of heart: Normal PMI, no thrills  Auscultation of heart: Normal rate and rhythm, normal S1 and S2, without murmurs  Examination of extremities for edema and/or varicosities: Normal     Carotid pulses: Normal     Abdomen   Abdomen: Non-tender, no masses  Liver and spleen: No hepatomegaly or splenomegaly  Lymphatic   Palpation of lymph nodes in neck: No lymphadenopathy  Musculoskeletal   Gait and station: Normal     Digits and nails: Normal without clubbing or cyanosis  Inspection/palpation of joints, bones, and muscles: Normal     Skin   Skin and subcutaneous tissue: Normal without rashes or lesions  Neurologic   Cranial nerves: Cranial nerves 2-12 intact  Sensation: No sensory loss  Psychiatric   Orientation to person, place, and time: Normal     Mood and affect: Normal         Assessment / Plan:    The patient is a 80-year-old male with multiple myeloma    He does have multiple comorbidities including diabetes mellitus and hypertension  We were consulted in August 2019 while he was inpatient due to recent labs revealing by clonal gammopathies with IgG kappa  He also does have elevated kidney function and anemia which warranted a workup for multiple myeloma  His skeletal survey was negative, however his bone marrow biopsy did confirm multiple myeloma so he was started on treatment with Velcade and Decadron weekly on a 35 day schedule  Velcade is 1 3 milligrams/meter squared and Decadron is 20 mg p o  On days 1, 8, 15, 22  At this point the patient is tolerating treatment very well  We will plan on proceeding with day 22 of cycle 1 tomorrow provided his blood counts remain within acceptable range  He is also taking acyclovir 400 mg daily at the nursing home that he resides at  We will see him back in 4 weeks with blood work including CBC, CMP, beta 2, free light chains, SPEP, and immunoglobulin levels  Goals and Barriers:  Current Goal:  Prolong Survival from multiple myeloma  Barriers: None  Patient's Capacity to Self Care:  Patient able to self care  ECOG 3    Portions of the record may have been created with voice recognition software   Occasional wrong word or "sound a like" substitutions may have occurred due to the inherent limitations of voice recognition software   Read the chart carefully and recognize, using context, where substitutions have occurred

## 2019-10-15 ENCOUNTER — HOSPITAL ENCOUNTER (OUTPATIENT)
Dept: INFUSION CENTER | Facility: HOSPITAL | Age: 69
Discharge: HOME | End: 2019-10-15
Attending: INTERNAL MEDICINE
Payer: COMMERCIAL

## 2019-10-15 VITALS
RESPIRATION RATE: 20 BRPM | WEIGHT: 315 LBS | HEIGHT: 75 IN | BODY MASS INDEX: 39.17 KG/M2 | HEART RATE: 71 BPM | TEMPERATURE: 99.8 F | DIASTOLIC BLOOD PRESSURE: 64 MMHG | SYSTOLIC BLOOD PRESSURE: 139 MMHG

## 2019-10-15 DIAGNOSIS — C90.00 MULTIPLE MYELOMA NOT HAVING ACHIEVED REMISSION (HCC): Primary | ICD-10-CM

## 2019-10-15 PROCEDURE — 96365 THER/PROPH/DIAG IV INF INIT: CPT

## 2019-10-15 PROCEDURE — 96401 CHEMO ANTI-NEOPL SQ/IM: CPT

## 2019-10-15 RX ORDER — DEXAMETHASONE 4 MG/1
20 TABLET ORAL ONCE
Status: COMPLETED | OUTPATIENT
Start: 2019-10-15 | End: 2019-10-15

## 2019-10-15 RX ORDER — SODIUM CHLORIDE 9 MG/ML
20 INJECTION, SOLUTION INTRAVENOUS ONCE
Status: COMPLETED | OUTPATIENT
Start: 2019-10-15 | End: 2019-10-15

## 2019-10-15 RX ADMIN — IRON SUCROSE 200 MG: 20 INJECTION, SOLUTION INTRAVENOUS at 14:26

## 2019-10-15 RX ADMIN — DEXAMETHASONE 20 MG: 4 TABLET ORAL at 15:04

## 2019-10-15 RX ADMIN — SODIUM CHLORIDE 20 ML/HR: 9 INJECTION, SOLUTION INTRAVENOUS at 14:26

## 2019-10-15 RX ADMIN — BORTEZOMIB 3.5 MG: 3.5 INJECTION, POWDER, LYOPHILIZED, FOR SOLUTION INTRAVENOUS; SUBCUTANEOUS at 15:12

## 2019-10-15 NOTE — PLAN OF CARE
Problem: Potential for Falls  Goal: Patient will remain free of falls  Description  INTERVENTIONS:  - Assess patient frequently for physical needs  -  Identify cognitive and physical deficits and behaviors that affect risk of falls    -  [place] fall precautions as indicated by assessment   - Educate patient/family on patient safety including physical limitations  - Instruct patient to call for assistance with activity based on assessment  - Modify environment to reduce risk of injury  - Consider OT/PT consult to assist with strengthening/mobility  Outcome: Progressing

## 2019-10-15 NOTE — PROGRESS NOTES
Infusion completed, injection given with no adverse reactions  Pt left unit via WC accompanied by transport

## 2019-10-16 ENCOUNTER — EVALUATION (OUTPATIENT)
Dept: PHYSICAL THERAPY | Age: 69
End: 2019-10-16
Payer: COMMERCIAL

## 2019-10-16 DIAGNOSIS — S78.112A ABOVE KNEE AMPUTATION OF LEFT LOWER EXTREMITY (HCC): ICD-10-CM

## 2019-10-16 DIAGNOSIS — I89.0 LYMPHEDEMA: Primary | ICD-10-CM

## 2019-10-16 PROCEDURE — 97163 PT EVAL HIGH COMPLEX 45 MIN: CPT

## 2019-10-17 NOTE — PROGRESS NOTES
PT Evaluation     Today's date: 10/16/2019  Patient name: Kavya Richmond  : 1950  MRN: [id number]  Referring provider: Jose Olivarez MD  Dx:   Encounter Diagnosis     ICD-10-CM    1  Lymphedema I89 0    2  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) [id number] [id number]                   Assessment/Plan    Subjective Evaluation    History of Present Illness  Onset date: increased right le edema for the past 2 months    Date of surgery: left aka           Recurrent probem    Quality of life: poor    Pain  Current pain ratin  At best pain ratin  At worst pain ratin  Location: right knee , hip and groin pain intermittent  Quality: dull ache, sharp, pulling and tight  Relieving factors: change in position, heat and rest  Aggravating factors: standing  Progression: worsening    Social Support  Steps to enter house: no  Stairs in house: no   Lives in: community-based residential facility  Lives with: alone    Employment status: not working  Exercise history: sedentary    Treatments  Previous treatment: physical therapy  Current treatment: physical therapy  Patient Goals  Patient goals for therapy: decreased edema, decreased pain, improved balance, increased motion, increased strength, independence with ADLs/IADLs and return to sport/leisure activities  Patient goal: to be able to stand to go to the bathroom and walk again and get a prosthetic leg        Objective    Flowsheet Rows      Most Recent Value   PT/OT G-Codes   Current Score  46   Projected Score  51             Precautions: LATEX Allergy  PMH:      Manual  10/16/19             I eval            Fit for compression knee highs 15-20 mmHG measurements taken                                                       Exercise Diary  10/16            bridging             Single leg bridge with roll             Hip abd and add sidelying              Sitting laq single, hip flexion bilaterl             Standing in ll bars , progress to hop to gait max assist of 1             Sit to stand with rolling walker from high mat table              Wheelchair push ups             Nu step trial one legged and ue's              Glut sets , adductor sets ,and right quad sets             There exer lymph exer packet for right le                                                                                                                                                    Modalities

## 2019-10-20 ENCOUNTER — APPOINTMENT (EMERGENCY)
Dept: RADIOLOGY | Facility: HOSPITAL | Age: 69
End: 2019-10-20
Payer: COMMERCIAL

## 2019-10-20 ENCOUNTER — HOSPITAL ENCOUNTER (EMERGENCY)
Facility: HOSPITAL | Age: 69
Discharge: HOME | End: 2019-10-21
Attending: EMERGENCY MEDICINE
Payer: COMMERCIAL

## 2019-10-20 VITALS
HEART RATE: 72 BPM | BODY MASS INDEX: 39.17 KG/M2 | RESPIRATION RATE: 22 BRPM | HEIGHT: 75 IN | TEMPERATURE: 97.5 F | DIASTOLIC BLOOD PRESSURE: 70 MMHG | OXYGEN SATURATION: 95 % | SYSTOLIC BLOOD PRESSURE: 151 MMHG | WEIGHT: 315 LBS

## 2019-10-20 DIAGNOSIS — S78.119A AMPUTATION ABOVE KNEE (HCC): ICD-10-CM

## 2019-10-20 DIAGNOSIS — M79.605 ACUTE LEG PAIN, LEFT: Primary | ICD-10-CM

## 2019-10-20 PROCEDURE — 73552 X-RAY EXAM OF FEMUR 2/>: CPT

## 2019-10-20 PROCEDURE — 99284 EMERGENCY DEPT VISIT MOD MDM: CPT

## 2019-10-20 PROCEDURE — 73502 X-RAY EXAM HIP UNI 2-3 VIEWS: CPT

## 2019-10-20 PROCEDURE — 99284 EMERGENCY DEPT VISIT MOD MDM: CPT | Performed by: EMERGENCY MEDICINE

## 2019-10-20 RX ORDER — BISACODYL 10 MG
10 SUPPOSITORY, RECTAL RECTAL AS NEEDED
Status: ON HOLD | COMMUNITY

## 2019-10-20 RX ORDER — ACETAMINOPHEN 325 MG/1
975 TABLET ORAL ONCE
Status: COMPLETED | OUTPATIENT
Start: 2019-10-20 | End: 2019-10-20

## 2019-10-20 RX ADMIN — ACETAMINOPHEN 975 MG: 325 TABLET ORAL at 22:33

## 2019-10-21 ENCOUNTER — TELEPHONE (OUTPATIENT)
Dept: FAMILY MEDICINE CLINIC | Facility: CLINIC | Age: 69
End: 2019-10-21

## 2019-10-21 DIAGNOSIS — E11.42 DIABETIC POLYNEUROPATHY ASSOCIATED WITH TYPE 2 DIABETES MELLITUS (HCC): Primary | ICD-10-CM

## 2019-10-21 RX ORDER — ACETAMINOPHEN 500 MG
500 TABLET ORAL EVERY 6 HOURS PRN
Qty: 90 TABLET | Refills: 1 | Status: ON HOLD | OUTPATIENT
Start: 2019-10-21

## 2019-10-21 NOTE — TELEPHONE ENCOUNTER
Arnaldo Moore from [facility] called  Patient went to ER yesterday for bumping his stump  He was discharged but with no Tylenol prn orders  Can you order? We can fax script to them at [phone]

## 2019-10-21 NOTE — ED NOTES
PC to [facility], advised pt being discharged, transport scheduled for 0030        Sue Rueda, RN  10/20/19 7005

## 2019-10-21 NOTE — ED PROVIDER NOTES
History  Chief Complaint   Patient presents with    Knee Pain     Pt fell "straight onto L knee" on Friday while getting out of bed; pt has brusing and increased pain today; denies headstrike     22-year-old male with numerous medical comorbidities including diastolic CHF, atrial fibrillation anticoagulated on Coumadin, peripheral arterial disease, chronic kidney disease, multiple myeloma being treated with Velcade and Decadron, hypertension who is presenting with an injury to his left AKA stump  Patient reports that he was attempting to transfer into his bed by using a walker earlier in the evening  He then began losing his balance and jumped into his bed  The patient landed in his bed directly onto his left AKA stump  The patient has had worsening pain of his left AKA stump, primarily in the lateral aspect of the stump but also in the left groin  Patient denies any pain anywhere else  He did not hit his head  No loss of consciousness  No other complaints at this time  Left AKA was performed on July 31, 2019  Prior to Admission Medications   Prescriptions Last Dose Informant Patient Reported? Taking?    Alcohol Swabs (ALCOHOL PREP) 70 % PADS  Outside Facility (Specify) Yes Yes   BD INSULIN SYRINGE U/F 31G X 5/16" 0 5 ML MISC  Outside Facility (Specify) Yes Yes   Sig: USE AS DIRECTED WITH NOVOLIN 70/30   BD PEN NEEDLE HILARIO U/F 32G X 4 MM MISC  Outside Facility (Specify) No Yes   Sig: by Other route 3 (three) times a day   Insulin Pen Needle 31G X 5 MM MISC  Outside Facility (Specify) No No   Sig: by Does not apply route 2 (two) times a day for 50 days   acyclovir (ZOVIRAX) 400 MG tablet  Outside Facility (Specify) No Yes   Sig: Take 1 tablet (400 mg total) by mouth daily   albuterol (PROVENTIL HFA,VENTOLIN HFA) 90 mcg/act inhaler  Outside Facility (Specify) No Yes   Sig: Inhale 2 puffs every 6 (six) hours as needed for wheezing   allopurinol (ZYLOPRIM) 300 mg tablet  Outside Facility (Specify) No Yes   Sig: TAKE 1 TABLET BY MOUTH DAILY   ammonium lactate (LAC-HYDRIN) 12 % lotion  Outside Facility (Specify) Yes No   atorvastatin (LIPITOR) 20 mg tablet  Outside Facility (Specify) No Yes   Sig: TAKE 1 TABLET BY MOUTH ONCE DAILY   bisacodyl (DULCOLAX) 10 mg suppository   Yes Yes   Sig: Insert 10 mg into the rectum as needed for constipation   bisacodyl (bisacodyl) 5 mg EC tablet   Yes No   Sig: Take 5 mg by mouth daily as needed for constipation   bortezomib (VELCADE)  Outside Facility (Specify) Yes Yes   Sig: Infuse into a venous catheter once   clotrimazole (LOTRIMIN) 1 % cream  Outside Facility (Specify) No Yes   Sig: Apply topically 2 (two) times a day   fluticasone-salmeterol (ADVAIR DISKUS, WIXELA INHUB) 250-50 mcg/dose inhaler  Outside Facility (Specify) No Yes   Sig: Inhale 1 puff 2 (two) times a day Rinse mouth after use     insulin aspart (NOVOLOG FLEXPEN) 100 Units/mL injection pen  Outside Facility (Specify) No Yes   Sig: Inject 30 Units under the skin 3 (three) times a day with meals   insulin glargine (LANTUS) 100 units/mL subcutaneous injection  Outside Facility (Specify) No Yes   Sig: Inject 18 Units under the skin daily at bedtime   metFORMIN (GLUCOPHAGE) 1000 MG tablet  Outside Facility (Specify) Yes Yes   metoprolol tartrate (LOPRESSOR) 25 mg tablet  Outside Facility (Specify) No Yes   Sig: TAKE 1 TABLET BY MOUTH EVERY 12 HOURS   torsemide (DEMADEX) 10 mg tablet  Outside Facility (Specify) No Yes   Sig: Take 1 tablet (10 mg total) by mouth 2 (two) times a day   warfarin (COUMADIN) 7 5 mg tablet  Outside Facility (Specify) No Yes   Si 5 mg on  and  then INR on       Facility-Administered Medications: None       Past Medical History:   Diagnosis Date    [facility]estive [facility] failure[facility])     Chronic kidney disease     COPD (chronic obstructive pulmonary disease) ([facility])     Decubitus ulcer of heel     LAST ASSESSED [id number]    Diabetes mellitus ([facility] )     History of varicose veins     Hypertension     Intermittent claudication (HCC)     Neuropathy     Seasonal allergies        Past Surgical History:   Procedure Laterality Date    AMPUTATION ABOVE KNEE (AKA) Left 7/31/2019    Procedure: AMPUTATION ABOVE KNEE (AKA); Surgeon: Cathi Thayer MD;  Location:  MAIN OR;  Service: General    ANGIOPLASTY      W/ FLUOROSC ANGIOGRAPGY PERIPHERAL ARTERY ADDITIONAL  LAST ASSESSED [id number]    ANGIOPLASTY / STENTING FEMORAL      TANSCATH INTRAVASCULAR STENT PLACEMENT PERCUTANEOUS FEMORAL     CT BONE MARROW BIOPSY AND ASPIRATION  8/9/2019    FULL THICKNESS SKIN GRAFT      TENDON REPAIR      TOE AMPUTATION Left 12/27/2018    Procedure: AMPUTATION left 4th TOE;  Surgeon: Nazanin Vann DPM;  Location:  MAIN OR;  Service: Podiatry       Family History   Problem Relation Age of Onset    Other Mother         CARDIAC DISORDER     Diabetes Mother     Cancer Father     Other Family         CARDIAC DISORDER     Diabetes Family     Cancer Family     Mental illness Family     Kidney disease Sister      I have reviewed and agree with the history as documented  Social History     Tobacco Use    Smoking status: Never Smoker    Smokeless tobacco: Never Used   Substance Use Topics    Alcohol use: Not Currently    Drug use: Not Currently        Review of Systems   Constitutional: Negative for diaphoresis, fever and unexpected weight change  HENT: Negative for congestion, rhinorrhea and sore throat  Eyes: Negative for pain, discharge and visual disturbance  Respiratory: Negative for cough, shortness of breath and wheezing  Cardiovascular: Negative for chest pain, palpitations and leg swelling  Gastrointestinal: Negative for abdominal pain, blood in stool, constipation, diarrhea, nausea and vomiting  Genitourinary: Negative for dysuria, flank pain and hematuria  Musculoskeletal: Negative for arthralgias and myalgias  Left AKA pain     Skin: Negative for rash and wound  Allergic/Immunologic: Negative for environmental allergies and food allergies  Neurological: Negative for dizziness, seizures, weakness and numbness  Hematological: Negative for adenopathy  Psychiatric/Behavioral: Negative for confusion and hallucinations  Physical Exam  ED Triage Vitals [10/20/19 2033]   Temperature Pulse Respirations Blood Pressure SpO2   97 5 °F (36 4 °C) 73 20 (!) 173/75 94 %      Temp Source Heart Rate Source Patient Position - Orthostatic VS BP Location FiO2 (%)   Oral Monitor Lying Right arm --      Pain Score       8             Orthostatic Vital Signs  Vitals:    10/20/19 2033 10/20/19 2145   BP: (!) 173/75 148/61   Pulse: 73 64   Patient Position - Orthostatic VS: Lying Lying       Physical Exam   Constitutional: He is oriented to person, place, and time  He appears well-developed and well-nourished  HENT:   Head: Normocephalic and atraumatic  Right Ear: External ear normal    Left Ear: External ear normal    Nose: Nose normal    No external signs of head trauma  Eyes: Pupils are equal, round, and reactive to light  EOM are normal    Neck: Normal range of motion  Neck supple  No cervical spine tenderness  Cardiovascular: Normal rate, regular rhythm and normal heart sounds  No murmur heard  Pulmonary/Chest: Effort normal and breath sounds normal  No respiratory distress  He has no wheezes  He has no rales  Abdominal: Soft  Bowel sounds are normal  He exhibits no distension  There is no tenderness  There is no guarding  Musculoskeletal: He exhibits tenderness and deformity  Left AKA stump noted  Surgical scar is healing well  Erythema of the distal stump  Tenderness to palpation of the lateral aspect of the stump and in the left groin  No other extremity tenderness  Neurological: He is alert and oriented to person, place, and time  No gross motor deficits noted  Cranial nerves II-XII are intact   Speech is fluent without dysarthria or aphasia  Skin: Skin is warm and dry  Capillary refill takes less than 2 seconds  He is not diaphoretic  Psychiatric: He has a normal mood and affect  His behavior is normal    Nursing note and vitals reviewed  ED Medications  Medications - No data to display    Diagnostic Studies  Results Reviewed     None                 XR hip/pelv 2-3 vws left if performed   ED Interpretation by Matias Hoff MD (10/20 2156)   No fracture demonstrated  XR femur 2 views LEFT   ED Interpretation by Matias Hoff MD (10/20 2156)   No fracture  Patient status post left AKA  Procedures  Procedures        ED Course           Identification of Seniors at Risk      Most Recent Value   (ISAR) Identification of Seniors at Risk   Before the illness or injury that brought you to the Emergency, did you need someone to help you on a regular basis? 1 Filed at: 10/20/2019 2034   In the last 24 hours, have you needed more help than usual?  1 Filed at: 10/20/2019 2034   Have you been hospitalized for one or more nights during the past 6 months? 0 Filed at: 10/20/2019 2034   In general, do you see well? 1 Filed at: 10/20/2019 2034   In general, do you have serious problems with your memory? 0 Filed at: 10/20/2019 2034   Do you take more than three different medications every day? 1 Filed at: 10/20/2019 2034   ISAR Score  4 Filed at: 10/20/2019 2034                          MDM  Number of Diagnoses or Management Options  Acute leg pain, left: new and requires workup  Amputation above knee [facility]): new and requires workup  Diagnosis management comments:     Patient presented with complaint of pain in his left AKA stump after falling directly on to the AKA stump  There were no external abnormalities on physical examination  X-ray of the pelvis, left hip joint, and left femur were negative for any acute pathology  Patient was treated with Tylenol  Return precautions were discussed    Patient verbalized understanding  Amount and/or Complexity of Data Reviewed  Tests in the radiology section of CPT®: ordered and reviewed  Decide to obtain previous medical records or to obtain history from someone other than the patient: yes  Review and summarize past medical records: yes  Independent visualization of images, tracings, or specimens: yes    Risk of Complications, Morbidity, and/or Mortality  Presenting problems: low  Diagnostic procedures: minimal  Management options: minimal    Patient Progress  Patient progress: stable      Disposition  Final diagnoses:   Acute leg pain, left   Amputation above knee ([address] )     Time reflects when diagnosis was documented in both MDM as applicable and the Disposition within this note     Time User Action Codes Description Comment    10/20/2019  9:56 PM Clay Minus Add [M79 [id number]] Acute leg pain, left     10/20/2019  9:57 PM Clay Minus Add [O12 327N] Amputation above knee [facility])       ED Disposition     ED Disposition Condition Date/Time Comment    Discharge Good Sun Oct 20, 2019  9:56 PM Mike Man discharge to home/self care  Follow-up Information     Follow up With Specialties Details Why Contact Info Additional Information    Jeffrey Agosto MD Family Medicine Call  As needed  [address] Emergency Department Emergency Medicine Go to  If symptoms worsen  [address] [place] [address] ED, [address] [place], [place], [id number] [phone]          Patient's Medications   Discharge Prescriptions    No medications on file     No discharge procedures on file  ED Provider  Attending physically available and evaluated Yuriytraci Man  I managed the patient along with the ED Attending      Electronically Signed by         June David MD  10/20/19 3121

## 2019-10-25 ENCOUNTER — APPOINTMENT (OUTPATIENT)
Dept: PHYSICAL THERAPY | Age: 69
End: 2019-10-25
Payer: COMMERCIAL

## 2019-10-29 ENCOUNTER — HOSPITAL ENCOUNTER (OUTPATIENT)
Dept: INFUSION CENTER | Facility: HOSPITAL | Age: 69
Discharge: HOME | End: 2019-10-29
Attending: INTERNAL MEDICINE

## 2019-10-29 ENCOUNTER — APPOINTMENT (EMERGENCY)
Dept: RADIOLOGY | Facility: HOSPITAL | Age: 69
DRG: 602 | End: 2019-10-29
Payer: COMMERCIAL

## 2019-10-29 ENCOUNTER — APPOINTMENT (EMERGENCY)
Dept: CT IMAGING | Facility: HOSPITAL | Age: 69
DRG: 602 | End: 2019-10-29
Payer: COMMERCIAL

## 2019-10-29 ENCOUNTER — HOSPITAL ENCOUNTER (INPATIENT)
Facility: HOSPITAL | Age: 69
LOS: 7 days | Discharge: NURSING, REHAB OR LONG-TERM CARE | DRG: 602 | End: 2019-11-05
Attending: EMERGENCY MEDICINE | Admitting: INTERNAL MEDICINE
Payer: COMMERCIAL

## 2019-10-29 ENCOUNTER — APPOINTMENT (OUTPATIENT)
Dept: PHYSICAL THERAPY | Age: 69
End: 2019-10-29
Payer: COMMERCIAL

## 2019-10-29 DIAGNOSIS — C90.00 MULTIPLE MYELOMA NOT HAVING ACHIEVED REMISSION (HCC): ICD-10-CM

## 2019-10-29 DIAGNOSIS — L03.90 CELLULITIS: Primary | ICD-10-CM

## 2019-10-29 DIAGNOSIS — R77.8 ELEVATED TROPONIN: ICD-10-CM

## 2019-10-29 DIAGNOSIS — R17 ELEVATED BILIRUBIN: ICD-10-CM

## 2019-10-29 PROBLEM — G47.33 OSA (OBSTRUCTIVE SLEEP APNEA): Chronic | Status: ACTIVE | Noted: 2019-05-07

## 2019-10-29 PROBLEM — E11.621 TYPE 2 DIABETES MELLITUS WITH LEFT DIABETIC FOOT ULCER (HCC): Status: RESOLVED | Noted: 2019-07-25 | Resolved: 2019-10-29

## 2019-10-29 PROBLEM — L97.521 DIABETIC ULCER OF TOE OF LEFT FOOT ASSOCIATED WITH TYPE 2 DIABETES MELLITUS, LIMITED TO BREAKDOWN OF SKIN (HCC): Status: RESOLVED | Noted: 2018-12-25 | Resolved: 2019-10-29

## 2019-10-29 PROBLEM — K44.9 HIATAL HERNIA: Chronic | Status: ACTIVE | Noted: 2019-10-29

## 2019-10-29 PROBLEM — E66.01 MORBID OBESITY (HCC): Chronic | Status: ACTIVE | Noted: 2017-10-09

## 2019-10-29 PROBLEM — R79.89 ELEVATED TROPONIN: Status: ACTIVE | Noted: 2019-10-29

## 2019-10-29 PROBLEM — J45.40 MODERATE PERSISTENT ASTHMA WITHOUT COMPLICATION: Chronic | Status: ACTIVE | Noted: 2019-05-07

## 2019-10-29 PROBLEM — R74.01 TRANSAMINITIS: Status: ACTIVE | Noted: 2019-10-29

## 2019-10-29 PROBLEM — D47.2 BICLONAL GAMMOPATHY: Chronic | Status: ACTIVE | Noted: 2019-08-08

## 2019-10-29 PROBLEM — N18.30 ACUTE RENAL FAILURE SUPERIMPOSED ON STAGE 3 CHRONIC KIDNEY DISEASE (HCC): Status: RESOLVED | Noted: 2019-07-30 | Resolved: 2019-10-29

## 2019-10-29 PROBLEM — I50.33 ACUTE ON CHRONIC DIASTOLIC CONGESTIVE HEART FAILURE (HCC): Chronic | Status: ACTIVE | Noted: 2017-10-11

## 2019-10-29 PROBLEM — K44.9 HIATAL HERNIA: Status: ACTIVE | Noted: 2019-10-29

## 2019-10-29 PROBLEM — L97.523 SKIN ULCER OF LEFT FOOT WITH NECROSIS OF MUSCLE (HCC): Status: RESOLVED | Noted: 2019-07-25 | Resolved: 2019-10-29

## 2019-10-29 PROBLEM — S78.112A ABOVE KNEE AMPUTATION OF LEFT LOWER EXTREMITY (HCC): Chronic | Status: ACTIVE | Noted: 2019-09-20

## 2019-10-29 PROBLEM — I73.9 PERIPHERAL VASCULAR DISEASE (HCC): Chronic | Status: ACTIVE | Noted: 2019-01-07

## 2019-10-29 PROBLEM — N17.9 ACUTE RENAL FAILURE SUPERIMPOSED ON STAGE 3 CHRONIC KIDNEY DISEASE (HCC): Status: RESOLVED | Noted: 2019-07-30 | Resolved: 2019-10-29

## 2019-10-29 PROBLEM — I48.20 CHRONIC ATRIAL FIBRILLATION (HCC): Chronic | Status: ACTIVE | Noted: 2017-10-09

## 2019-10-29 PROBLEM — L97.529 TYPE 2 DIABETES MELLITUS WITH LEFT DIABETIC FOOT ULCER (HCC): Status: RESOLVED | Noted: 2019-07-25 | Resolved: 2019-10-29

## 2019-10-29 PROBLEM — E11.621 DIABETIC ULCER OF TOE OF LEFT FOOT ASSOCIATED WITH TYPE 2 DIABETES MELLITUS, LIMITED TO BREAKDOWN OF SKIN (HCC): Status: RESOLVED | Noted: 2018-12-25 | Resolved: 2019-10-29

## 2019-10-29 PROBLEM — R60.0 LOCALIZED EDEMA: Chronic | Status: ACTIVE | Noted: 2018-04-16

## 2019-10-29 PROBLEM — R53.1 WEAKNESS: Status: ACTIVE | Noted: 2019-10-29

## 2019-10-29 LAB
ALBUMIN SERPL BCP-MCNC: 2.4 G/DL (ref 3.5–5)
ALP SERPL-CCNC: 394 U/L (ref 46–116)
ALT SERPL W P-5'-P-CCNC: 81 U/L (ref 12–78)
ANION GAP SERPL CALCULATED.3IONS-SCNC: 5 MMOL/L (ref 4–13)
AST SERPL W P-5'-P-CCNC: 73 U/L (ref 5–45)
BASOPHILS # BLD AUTO: 0.03 THOUSANDS/ΜL (ref 0–0.1)
BASOPHILS NFR BLD AUTO: 0 % (ref 0–1)
BILIRUB SERPL-MCNC: 1.5 MG/DL (ref 0.2–1)
BILIRUB UR QL STRIP: NEGATIVE
BUN SERPL-MCNC: 29 MG/DL (ref 5–25)
CALCIUM SERPL-MCNC: 8.3 MG/DL (ref 8.3–10.1)
CHLORIDE SERPL-SCNC: 104 MMOL/L (ref 100–108)
CLARITY UR: CLEAR
CO2 SERPL-SCNC: 30 MMOL/L (ref 21–32)
COLOR UR: YELLOW
CREAT SERPL-MCNC: 1.37 MG/DL (ref 0.6–1.3)
EOSINOPHIL # BLD AUTO: 0.18 THOUSAND/ΜL (ref 0–0.61)
EOSINOPHIL NFR BLD AUTO: 3 % (ref 0–6)
ERYTHROCYTE [DISTWIDTH] IN BLOOD BY AUTOMATED COUNT: 16.7 % (ref 11.6–15.1)
GFR SERPL CREATININE-BSD FRML MDRD: 53 ML/MIN/1.73SQ M
GLUCOSE SERPL-MCNC: 182 MG/DL (ref 65–140)
GLUCOSE SERPL-MCNC: 81 MG/DL (ref 65–140)
GLUCOSE UR STRIP-MCNC: NEGATIVE MG/DL
HCT VFR BLD AUTO: 28.7 % (ref 36.5–49.3)
HGB BLD-MCNC: 8.5 G/DL (ref 12–17)
HGB UR QL STRIP.AUTO: NEGATIVE
IMM GRANULOCYTES # BLD AUTO: 0.11 THOUSAND/UL (ref 0–0.2)
IMM GRANULOCYTES NFR BLD AUTO: 2 % (ref 0–2)
INR PPP: 2.23 (ref 0.84–1.19)
KETONES UR STRIP-MCNC: NEGATIVE MG/DL
LACTATE SERPL-SCNC: 1.8 MMOL/L (ref 0.5–2)
LEUKOCYTE ESTERASE UR QL STRIP: NEGATIVE
LYMPHOCYTES # BLD AUTO: 0.81 THOUSANDS/ΜL (ref 0.6–4.47)
LYMPHOCYTES NFR BLD AUTO: 11 % (ref 14–44)
MCH RBC QN AUTO: 28 PG (ref 26.8–34.3)
MCHC RBC AUTO-ENTMCNC: 29.6 G/DL (ref 31.4–37.4)
MCV RBC AUTO: 94 FL (ref 82–98)
MONOCYTES # BLD AUTO: 0.49 THOUSAND/ΜL (ref 0.17–1.22)
MONOCYTES NFR BLD AUTO: 7 % (ref 4–12)
NEUTROPHILS # BLD AUTO: 5.69 THOUSANDS/ΜL (ref 1.85–7.62)
NEUTS SEG NFR BLD AUTO: 77 % (ref 43–75)
NITRITE UR QL STRIP: NEGATIVE
NRBC BLD AUTO-RTO: 1 /100 WBCS
NT-PROBNP SERPL-MCNC: 2827 PG/ML
PH UR STRIP.AUTO: 5 [PH]
PLATELET # BLD AUTO: 340 THOUSANDS/UL (ref 149–390)
PMV BLD AUTO: 10 FL (ref 8.9–12.7)
POTASSIUM SERPL-SCNC: 4.6 MMOL/L (ref 3.5–5.3)
PROT SERPL-MCNC: 6.6 G/DL (ref 6.4–8.2)
PROT UR STRIP-MCNC: NEGATIVE MG/DL
PROTHROMBIN TIME: 24.4 SECONDS (ref 11.6–14.5)
RBC # BLD AUTO: 3.04 MILLION/UL (ref 3.88–5.62)
SODIUM SERPL-SCNC: 139 MMOL/L (ref 136–145)
SP GR UR STRIP.AUTO: 1.01 (ref 1–1.03)
TROPONIN I SERPL-MCNC: 0.15 NG/ML
TROPONIN I SERPL-MCNC: 0.16 NG/ML
UROBILINOGEN UR QL STRIP.AUTO: 2 E.U./DL
WBC # BLD AUTO: 7.31 THOUSAND/UL (ref 4.31–10.16)

## 2019-10-29 PROCEDURE — 87040 BLOOD CULTURE FOR BACTERIA: CPT | Performed by: EMERGENCY MEDICINE

## 2019-10-29 PROCEDURE — 99223 1ST HOSP IP/OBS HIGH 75: CPT | Performed by: NURSE PRACTITIONER

## 2019-10-29 PROCEDURE — 85610 PROTHROMBIN TIME: CPT | Performed by: NURSE PRACTITIONER

## 2019-10-29 PROCEDURE — 84484 ASSAY OF TROPONIN QUANT: CPT | Performed by: NURSE PRACTITIONER

## 2019-10-29 PROCEDURE — 99285 EMERGENCY DEPT VISIT HI MDM: CPT | Performed by: EMERGENCY MEDICINE

## 2019-10-29 PROCEDURE — 96367 TX/PROPH/DG ADDL SEQ IV INF: CPT

## 2019-10-29 PROCEDURE — 96365 THER/PROPH/DIAG IV INF INIT: CPT

## 2019-10-29 PROCEDURE — 85025 COMPLETE CBC W/AUTO DIFF WBC: CPT | Performed by: EMERGENCY MEDICINE

## 2019-10-29 PROCEDURE — 83605 ASSAY OF LACTIC ACID: CPT | Performed by: EMERGENCY MEDICINE

## 2019-10-29 PROCEDURE — 99285 EMERGENCY DEPT VISIT HI MDM: CPT

## 2019-10-29 PROCEDURE — 83880 ASSAY OF NATRIURETIC PEPTIDE: CPT | Performed by: EMERGENCY MEDICINE

## 2019-10-29 PROCEDURE — 96366 THER/PROPH/DIAG IV INF ADDON: CPT

## 2019-10-29 PROCEDURE — 81003 URINALYSIS AUTO W/O SCOPE: CPT | Performed by: EMERGENCY MEDICINE

## 2019-10-29 PROCEDURE — 84145 PROCALCITONIN (PCT): CPT | Performed by: NURSE PRACTITIONER

## 2019-10-29 PROCEDURE — 71275 CT ANGIOGRAPHY CHEST: CPT

## 2019-10-29 PROCEDURE — 80053 COMPREHEN METABOLIC PANEL: CPT | Performed by: EMERGENCY MEDICINE

## 2019-10-29 PROCEDURE — 82948 REAGENT STRIP/BLOOD GLUCOSE: CPT

## 2019-10-29 PROCEDURE — 71046 X-RAY EXAM CHEST 2 VIEWS: CPT

## 2019-10-29 PROCEDURE — 93005 ELECTROCARDIOGRAM TRACING: CPT

## 2019-10-29 PROCEDURE — 84484 ASSAY OF TROPONIN QUANT: CPT | Performed by: EMERGENCY MEDICINE

## 2019-10-29 PROCEDURE — 36415 COLL VENOUS BLD VENIPUNCTURE: CPT | Performed by: EMERGENCY MEDICINE

## 2019-10-29 RX ORDER — WARFARIN SODIUM 7.5 MG/1
7.5 TABLET ORAL
Status: DISCONTINUED | OUTPATIENT
Start: 2019-10-29 | End: 2019-11-05 | Stop reason: HOSPADM

## 2019-10-29 RX ORDER — ALLOPURINOL 300 MG/1
300 TABLET ORAL DAILY
Status: DISCONTINUED | OUTPATIENT
Start: 2019-10-30 | End: 2019-11-05 | Stop reason: HOSPADM

## 2019-10-29 RX ORDER — FLUTICASONE FUROATE AND VILANTEROL 200; 25 UG/1; UG/1
1 POWDER RESPIRATORY (INHALATION)
Status: DISCONTINUED | OUTPATIENT
Start: 2019-10-30 | End: 2019-11-05 | Stop reason: HOSPADM

## 2019-10-29 RX ORDER — ATORVASTATIN CALCIUM 20 MG/1
20 TABLET, FILM COATED ORAL DAILY
Status: DISCONTINUED | OUTPATIENT
Start: 2019-10-30 | End: 2019-11-05 | Stop reason: HOSPADM

## 2019-10-29 RX ORDER — ALBUTEROL SULFATE 90 UG/1
2 AEROSOL, METERED RESPIRATORY (INHALATION) EVERY 6 HOURS PRN
Status: DISCONTINUED | OUTPATIENT
Start: 2019-10-29 | End: 2019-11-05 | Stop reason: HOSPADM

## 2019-10-29 RX ORDER — FUROSEMIDE 10 MG/ML
40 INJECTION INTRAMUSCULAR; INTRAVENOUS
Status: DISCONTINUED | OUTPATIENT
Start: 2019-10-29 | End: 2019-11-05 | Stop reason: HOSPADM

## 2019-10-29 RX ORDER — ACYCLOVIR 800 MG/1
400 TABLET ORAL DAILY
Status: DISCONTINUED | OUTPATIENT
Start: 2019-10-30 | End: 2019-11-05 | Stop reason: HOSPADM

## 2019-10-29 RX ORDER — ACETAMINOPHEN 325 MG/1
650 TABLET ORAL EVERY 6 HOURS PRN
Status: DISCONTINUED | OUTPATIENT
Start: 2019-10-29 | End: 2019-11-05 | Stop reason: HOSPADM

## 2019-10-29 RX ORDER — BISACODYL 10 MG
10 SUPPOSITORY, RECTAL RECTAL AS NEEDED
Status: DISCONTINUED | OUTPATIENT
Start: 2019-10-29 | End: 2019-11-05 | Stop reason: HOSPADM

## 2019-10-29 RX ORDER — INSULIN GLARGINE 100 [IU]/ML
18 INJECTION, SOLUTION SUBCUTANEOUS
Status: DISCONTINUED | OUTPATIENT
Start: 2019-10-29 | End: 2019-11-05 | Stop reason: HOSPADM

## 2019-10-29 RX ADMIN — METOPROLOL TARTRATE 25 MG: 25 TABLET ORAL at 21:59

## 2019-10-29 RX ADMIN — CEFEPIME HYDROCHLORIDE 2000 MG: 2 INJECTION, SOLUTION INTRAVENOUS at 16:12

## 2019-10-29 RX ADMIN — FUROSEMIDE 40 MG: 10 INJECTION, SOLUTION INTRAMUSCULAR; INTRAVENOUS at 21:59

## 2019-10-29 RX ADMIN — ACETAMINOPHEN 650 MG: 325 TABLET, FILM COATED ORAL at 22:36

## 2019-10-29 RX ADMIN — WARFARIN SODIUM 7.5 MG: 7.5 TABLET ORAL at 23:36

## 2019-10-29 RX ADMIN — IOHEXOL 85 ML: 350 INJECTION, SOLUTION INTRAVENOUS at 17:59

## 2019-10-29 RX ADMIN — VANCOMYCIN HYDROCHLORIDE 2000 MG: 1 INJECTION, POWDER, LYOPHILIZED, FOR SOLUTION INTRAVENOUS at 16:45

## 2019-10-29 RX ADMIN — INSULIN GLARGINE 18 UNITS: 100 INJECTION, SOLUTION SUBCUTANEOUS at 22:20

## 2019-10-29 NOTE — PROGRESS NOTES
Pt arrived on unit today for Velcade  Pt states that he is having a bad day and he doubted he would be bale to stand up to transfer  He states he is very weak and feels he is filling up with fluid like when he is in CHF  Pt's right leg is red and swollen with +3 pitting edema around the calf area  He feels he needs to go to  ER as he is SOB  Called and notified ED pt was coming for evaluation  Pt then taken by w/c to ER  Elmer Zheng from [facility] notified pt is in ED  Spoke to his transporter Sarah Myers and let him know he is in the ED

## 2019-10-29 NOTE — ED PROVIDER NOTES
History  Chief Complaint   Patient presents with    Shortness of Breath     pt states being SOB since last night, came to infusion center today, was sent down  pt states he feels like hes in heart failure     HPI    Patient is a 33-year-old male who reports to the emergency department with shortness of breath, wheezing  He is concerned that he is in heart failure     + fever here  He has a left BKA and his RLE is red and foul smelling - possibly cellulitis vs chronic changes with foul smell  Appears dyspneic on exam  Overweight  MDM 81year old male who presents with shortness of breath, he is febrile, will admit for cellulitis and fever  Prior to Admission Medications   Prescriptions Last Dose Informant Patient Reported? Taking?    Alcohol Swabs (ALCOHOL PREP) 70 % PADS  Outside Facility (Specify) Yes No   BD INSULIN SYRINGE U/F 31G X 5/16" 0 5 ML MISC  Outside Facility (Specify) Yes No   Sig: USE AS DIRECTED WITH NOVOLIN 70/30   BD PEN NEEDLE HILARIO U/F 32G X 4 MM MISC  Outside Facility (Specify) No No   Sig: by Other route 3 (three) times a day   Insulin Pen Needle 31G X 5 MM MISC  Outside Facility (Specify) No No   Sig: by Does not apply route 2 (two) times a day for 50 days   acetaminophen (TYLENOL) 500 mg tablet   No No   Sig: Take 1 tablet (500 mg total) by mouth every 6 (six) hours as needed for mild pain, headaches or fever   acyclovir (ZOVIRAX) 400 MG tablet  Outside Facility (Specify) No No   Sig: Take 1 tablet (400 mg total) by mouth daily   albuterol (PROVENTIL HFA,VENTOLIN HFA) 90 mcg/act inhaler  Outside Facility (Specify) No No   Sig: Inhale 2 puffs every 6 (six) hours as needed for wheezing   allopurinol (ZYLOPRIM) 300 mg tablet  Outside Facility (Specify) No No   Sig: TAKE 1 TABLET BY MOUTH DAILY   ammonium lactate (LAC-HYDRIN) 12 % lotion  Outside Facility (Specify) Yes No   atorvastatin (LIPITOR) 20 mg tablet  Outside Facility (Specify) No No   Sig: TAKE 1 TABLET BY MOUTH ONCE DAILY   bisacodyl (DULCOLAX) 10 mg suppository   Yes No   Sig: Insert 10 mg into the rectum as needed for constipation   bisacodyl (bisacodyl) 5 mg EC tablet   Yes No   Sig: Take 5 mg by mouth daily as needed for constipation   bortezomib (VELCADE)  Outside Facility (Specify) Yes No   Sig: Infuse into a venous catheter once   clotrimazole (LOTRIMIN) 1 % cream  Outside Facility (Specify) No No   Sig: Apply topically 2 (two) times a day   fluticasone-salmeterol (ADVAIR DISKUS, WIXELA INHUB) 250-50 mcg/dose inhaler  Outside Facility (Specify) No No   Sig: Inhale 1 puff 2 (two) times a day Rinse mouth after use     insulin aspart (NOVOLOG FLEXPEN) 100 Units/mL injection pen  Outside Facility (Specify) No No   Sig: Inject 30 Units under the skin 3 (three) times a day with meals   insulin glargine (LANTUS) 100 units/mL subcutaneous injection  Outside Facility (Specify) No No   Sig: Inject 18 Units under the skin daily at bedtime   metFORMIN (GLUCOPHAGE) 1000 MG tablet  Outside Facility (Specify) Yes No   metoprolol tartrate (LOPRESSOR) 25 mg tablet  Outside Facility (Specify) No No   Sig: TAKE 1 TABLET BY MOUTH EVERY 12 HOURS   torsemide (DEMADEX) 10 mg tablet  Outside Facility (Specify) No No   Sig: Take 1 tablet (10 mg total) by mouth 2 (two) times a day   warfarin (COUMADIN) 7 5 mg tablet  Outside Facility (Specify) No No   Si 5 mg on  and  then INR on       Facility-Administered Medications: None       Past Medical History:   Diagnosis Date    Cancer [facility])     CHF (congestive heart failure) (HCC)     Chronic kidney disease     COPD (chronic obstructive pulmonary disease) (HCC)     Decubitus ulcer of heel     LAST ASSESSED [id number]    Diabetes mellitus ([facility] [address] )     History of varicose veins     Hypertension     Intermittent claudication (HCC)     Neuropathy     Seasonal allergies        Past Surgical History:   Procedure Laterality Date    AMPUTATION ABOVE KNEE (AKA) Left 7/31/2019    Procedure: AMPUTATION ABOVE KNEE (AKA); Surgeon: Angela Bradley MD;  Location:  MAIN OR;  Service: General    ANGIOPLASTY      W/ FLUOROSC ANGIOGRAPGY PERIPHERAL ARTERY ADDITIONAL  LAST ASSESSED [id number]    ANGIOPLASTY / STENTING FEMORAL      TANSCATH INTRAVASCULAR STENT PLACEMENT PERCUTANEOUS FEMORAL     CT BONE MARROW BIOPSY AND ASPIRATION  8/9/2019    FULL THICKNESS SKIN GRAFT      TENDON REPAIR      TOE AMPUTATION Left 12/27/2018    Procedure: AMPUTATION left 4th TOE;  Surgeon: Luis Miguel Alcantar DPM;  Location: [facility];  Service: Podiatry       Family History   Problem Relation Age of Onset    Other Mother         CARDIAC DISORDER     Diabetes Mother     Cancer Father     Other Family         CARDIAC DISORDER     Diabetes Family     Cancer Family     Mental illness Family     Kidney disease Sister      I have reviewed and agree with the history as documented  Social History     Tobacco Use    Smoking status: Never Smoker    Smokeless tobacco: Never Used   Substance Use Topics    Alcohol use: Not Currently    Drug use: Not Currently        Review of Systems   Constitutional: Positive for fatigue and fever  Respiratory: Positive for shortness of breath  All other systems reviewed and are negative  Physical Exam  Physical Exam   Constitutional: He is oriented to person, place, and time  He appears well-developed and well-nourished  HENT:   Head: Normocephalic and atraumatic  Eyes: Pupils are equal, round, and reactive to light  EOM are normal    Neck: Normal range of motion  Neck supple  Cardiovascular: Normal rate and normal heart sounds  No murmur heard  irregular   Pulmonary/Chest: Effort normal and breath sounds normal  No respiratory distress  He has no wheezes  Abdominal: Soft  Bowel sounds are normal  He exhibits no distension  There is no tenderness  Musculoskeletal: Normal range of motion  He exhibits no edema or tenderness     Left bka, right sided cellulitis, lower extremity   Neurological: He is alert and oriented to person, place, and time  No cranial nerve deficit  Coordination normal    Skin: Skin is warm and dry  He is not diaphoretic  No erythema  Psychiatric: He has a normal mood and affect  His behavior is normal    Nursing note and vitals reviewed        Vital Signs  ED Triage Vitals   Temperature Pulse Respirations Blood Pressure SpO2   10/29/19 1400 10/29/19 1352 10/29/19 1353 10/29/19 1353 10/29/19 1352   (!) 100 8 °F (38 2 °C) 88 (!) 25 138/62 95 %      Temp Source Heart Rate Source Patient Position - Orthostatic VS BP Location FiO2 (%)   10/29/19 2037 10/29/19 1352 10/29/19 1353 10/29/19 1353 --   Oral Monitor Lying Right arm       Pain Score       10/29/19 1352       No Pain           Vitals:    11/02/19 0727 11/02/19 1544 11/02/19 2140 11/02/19 2300   BP: 142/66 142/64 137/63 170/79   Pulse: 56 99 75 67   Patient Position - Orthostatic VS: Lying Sitting  Lying         Visual Acuity      ED Medications  Medications   acyclovir (ZOVIRAX) tablet 400 mg (400 mg Oral Given 11/2/19 0809)   fluticasone-vilanterol (BREO ELLIPTA) 200-25 MCG/INH inhaler 1 puff (1 puff Inhalation Given 11/2/19 0907)   insulin lispro (HumaLOG) 100 units/mL subcutaneous injection 30 Units (30 Units Subcutaneous Not Given 11/2/19 1644)   insulin glargine (LANTUS) subcutaneous injection 18 Units 0 18 mL (18 Units Subcutaneous Given 11/2/19 2140)   albuterol (PROVENTIL HFA,VENTOLIN HFA) inhaler 2 puff (2 puffs Inhalation Given 11/2/19 0907)   bisacodyl (DULCOLAX) rectal suppository 10 mg (has no administration in time range)   allopurinol (ZYLOPRIM) tablet 300 mg (300 mg Oral Given 11/2/19 0809)   atorvastatin (LIPITOR) tablet 20 mg (20 mg Oral Given 11/2/19 0810)   metoprolol tartrate (LOPRESSOR) tablet 25 mg (25 mg Oral Given 11/2/19 2141)   warfarin (COUMADIN) tablet 7 5 mg (7 5 mg Oral Given 11/2/19 1714)   acetaminophen (TYLENOL) tablet 650 mg (650 mg Oral Given 10/29/19 2236)   furosemide (LASIX) injection 40 mg (40 mg Intravenous Given 11/2/19 1713)   cefepime (MAXIPIME) 1 g/50 mL dextrose IVPB (1,000 mg Intravenous New Bag 11/2/19 1713)   insulin lispro (HumaLOG) 100 units/mL subcutaneous injection 2-12 Units (2 Units Subcutaneous Not Given 11/2/19 1635)   insulin lispro (HumaLOG) 100 units/mL subcutaneous injection 1-6 Units (1 Units Subcutaneous Refused 11/2/19 2140)   vancomycin (VANCOCIN) IVPB (premix) 1,000 mg (1,000 mg Intravenous New Bag 11/2/19 2140)   vancomycin (VANCOCIN) 2,000 mg in sodium chloride 0 9 % 500 mL IVPB (2,000 mg Intravenous New Bag 10/29/19 1645)   cefepime (MAXIPIME) 2 g/50 mL dextrose IVPB (0 mg Intravenous Stopped 10/29/19 1642)   iohexol (OMNIPAQUE) 350 MG/ML injection (MULTI-DOSE) 85 mL (85 mL Intravenous Given 10/29/19 1759)   influenza vaccine, high-dose (FLUZONE HIGH-DOSE) IM injection KRISTAL 0 5 mL (0 5 mL Intramuscular Given 10/30/19 1030)       Diagnostic Studies  Results Reviewed     Procedure Component Value Units Date/Time    Blood culture #1 [[id number]] Collected:  10/29/19 1424    Lab Status:  Preliminary result Specimen:  Blood from Arm, Right Updated:  11/02/19 2101     Blood Culture No Growth After 4 Days  Blood culture #2 [[id number]] Collected:  10/29/19 1424    Lab Status:  Preliminary result Specimen:  Blood from Arm, Right Updated:  11/02/19 2101     Blood Culture No Growth After 4 Days      UA w Reflex to Microscopic w Reflex to Culture [[id number]]  (Abnormal) Collected:  10/29/19 1612    Lab Status:  Final result Specimen:  Urine, Clean Catch Updated:  10/29/19 1622     Color, UA Yellow     Clarity, UA Clear     Specific Gravity, UA 1 015     pH, UA 5 0     Leukocytes, UA Negative     Nitrite, UA Negative     Protein, UA Negative mg/dl      Glucose, UA Negative mg/dl      Ketones, UA Negative mg/dl      Urobilinogen, UA 2 0 E U /dl      Bilirubin, UA Negative     Blood, UA Negative    Comprehensive metabolic panel [[id number]]  (Abnormal) Collected:  10/29/19 1424    Lab Status:  Final result Specimen:  Blood from Arm, Right Updated:  10/29/19 1521     Sodium 139 mmol/L      Potassium 4 6 mmol/L      Chloride 104 mmol/L      CO2 30 mmol/L      ANION GAP 5 mmol/L      BUN 29 mg/dL      Creatinine 1 37 mg/dL      Glucose 182 mg/dL      Calcium 8 3 mg/dL      AST 73 U/L      ALT 81 U/L      Alkaline Phosphatase 394 U/L      Total Protein 6 6 g/dL      Albumin 2 4 g/dL      Total Bilirubin 1 50 mg/dL      eGFR 53 ml/min/1 73sq m     Narrative:       [facility] guidelines for Chronic Kidney Disease (CKD):     Stage 1 with normal or high GFR (GFR > 90 mL/min/1 73 square meters)    Stage 2 Mild CKD (GFR = 60-89 mL/min/1 73 square meters)    Stage 3A Moderate CKD (GFR = 45-59 mL/min/1 73 square meters)    Stage 3B Moderate CKD (GFR = 30-44 mL/min/1 73 square meters)    Stage 4 Severe CKD (GFR = 15-29 mL/min/1 73 square meters)    Stage 5 End Stage CKD (GFR <15 mL/min/1 73 square meters)  Note: GFR calculation is accurate only with a steady state creatinine    NT-BNP PRO [[id number]]  (Abnormal) Collected:  10/29/19 1424    Lab Status:  Final result Specimen:  Blood from Arm, Right Updated:  10/29/19 1521     NT-proBNP 2,827 pg/mL     Troponin I [[id number]]  (Abnormal) Collected:  10/29/19 1424    Lab Status:  Final result Specimen:  Blood from Arm, Right Updated:  10/29/19 1514     Troponin I 0 15 ng/mL     Lactic acid, plasma x2 [[id number]]  (Normal) Collected:  10/29/19 1424    Lab Status:  Final result Specimen:  Blood from Arm, Right Updated:  10/29/19 1512     LACTIC ACID 1 8 mmol/L     Narrative:       Result may be elevated if tourniquet was used during collection      CBC and differential [[id number]]  (Abnormal) Collected:  10/29/19 1424    Lab Status:  Final result Specimen:  Blood from Arm, Right Updated:  10/29/19 1441     WBC 7 31 Thousand/uL      RBC 3 04 Million/uL      Hemoglobin 8 5 g/dL Hematocrit 28 7 %      MCV 94 fL      MCH 28 0 pg      MCHC 29 6 g/dL      RDW 16 7 %      MPV 10 0 fL      Platelets 776 Thousands/uL      nRBC 1 /100 WBCs      Neutrophils Relative 77 %      Immat GRANS % 2 %      Lymphocytes Relative 11 %      Monocytes Relative 7 %      Eosinophils Relative 3 %      Basophils Relative 0 %      Neutrophils Absolute 5 69 Thousands/µL      Immature Grans Absolute 0 11 Thousand/uL      Lymphocytes Absolute 0 81 Thousands/µL      Monocytes Absolute 0 49 Thousand/µL      Eosinophils Absolute 0 18 Thousand/µL      Basophils Absolute 0 03 Thousands/µL                  MRI abdomen wo contrast and mrcp   Final Result by Coralee Ganser, MD (10/31 2029)      Examination significantly limited by body habitus  There is no gross choledocholithiasis  CBD normal in diameter  Cholelithiasis with filling defects noted in the gallbladder neck  No surrounding inflammation  Simple hepatic cyst in the right lobe measuring 1 5 cm  Moderate hiatal hernia  Cardiomegaly with small bilateral pleural effusions  Partially imaged masslike lesion anterior to the left psoas muscle as seen on images 5/16 and 1402/95 measuring approximately 4 5 x 2 5 cm  This could represent adenopathy or other mass  CT scan of the abdomen and pelvis with contrast recommended  The study was marked in [facility] for immediate notification  Workstation performed: [id number]         XR foot 2 vw right   Final Result by Ayde Thapa MD (10/30 4534)      No radiographic evidence of osteomyelitis or soft tissue air            Workstation performed: [id number]         US right upper quadrant   Final Result by Tamanna Solis MD (10/30 6675)   Evidence of gallstone with borderline wall thickening  The common duct is normal in caliber but small stone within the duct is possible versus adjacent fat        Direct biliary imaging would be useful for further assessment especially given transaminitis  Immediate was noted on the Epic system  Workstation performed: [id number]         CTA ED chest PE Study   Final Result by Terrie Boas, MD (10/29 1811)         1  No evidence of acute pulmonary embolus embolus or thoracic aortic aneurysm  No acute cardiopulmonary process  2   Moderate to large hiatal hernia  Workstation performed: [id number]         XR chest 2 views   Final Result by Modesto Van MD (10/29 1502)      Low lung volumes with bibasilar atelectasis with no definite pneumonia  Retrograde cardiac opacity due to hiatal hernia  Workstation performed: [id number]                    Procedures  Procedures       ED Course  ED Course as of Nov 03 0137   Tue Oct 29, 2019   1815   Impression        1   No evidence of acute pulmonary embolus embolus or thoracic aortic aneurysm   No acute cardiopulmonary process  2   Moderate to large hiatal hernia  MDM    Disposition  Final diagnoses:   Cellulitis   Elevated troponin     Time reflects when diagnosis was documented in both MDM as applicable and the Disposition within this note     Time User Action Codes Description Comment    10/29/2019  7:06 PM Catha Tracy T Add [L03 90] Cellulitis     10/29/2019  7:06 PM Temitope Mora, [address] [R79 89] Elevated troponin     10/30/2019  5:12 PM Julio Cesar Ferreira Add [R17] Elevated bilirubin       ED Disposition     ED Disposition Condition Date/Time Comment    Admit Stable Tue Oct 29, 2019  7:06 PM Case was discussed with julienne and the patient's admission status was agreed to be Admission Status: inpatient status to the service of Dr Donovan Lin           Follow-up Information    None         Current Discharge Medication List      CONTINUE these medications which have NOT CHANGED    Details   acetaminophen (TYLENOL) 500 mg tablet Take 1 tablet (500 mg total) by mouth every 6 (six) hours as needed for mild pain, headaches or fever  Qty: 90 tablet, Refills: 1    Associated Diagnoses: Diabetic polyneuropathy associated with type 2 diabetes mellitus (HCC)      acyclovir (ZOVIRAX) 400 MG tablet Take 1 tablet (400 mg total) by mouth daily  Qty: 30 tablet, Refills: 3    Associated Diagnoses: Multiple myeloma not having achieved remission ([facility])      albuterol (PROVENTIL HFA,VENTOLIN HFA) 90 mcg/act inhaler Inhale 2 puffs every 6 (six) hours as needed for wheezing  Qty: 1 Inhaler, Refills: 0    Comments: Substitution to a formulary equivalent within the same pharmaceutical class is authorized  Associated Diagnoses: Moderate persistent asthma without complication      Alcohol Swabs (ALCOHOL PREP) 70 % PADS Refills: 0      allopurinol (ZYLOPRIM) 300 mg tablet TAKE 1 TABLET BY MOUTH DAILY  Qty: 90 tablet, Refills: 1    Associated Diagnoses: Chronic gout without tophus, unspecified cause, unspecified site      ammonium lactate (LAC-HYDRIN) 12 % lotion Refills: 99      atorvastatin (LIPITOR) 20 mg tablet TAKE 1 TABLET BY MOUTH ONCE DAILY  Qty: 90 tablet, Refills: 0    Associated Diagnoses: Hyperlipidemia LDL goal <70      BD INSULIN SYRINGE U/F 31G X 5/16" 0 5 ML MISC USE AS DIRECTED WITH NOVOLIN 70/30  Refills: 0      BD PEN NEEDLE HILARIO U/F 32G X 4 MM MISC by Other route 3 (three) times a day  Qty: 300 each, Refills: 1    Associated Diagnoses: Uncontrolled type 2 diabetes mellitus with hyperglycemia ([facility])      bisacodyl (bisacodyl) 5 mg EC tablet Take 5 mg by mouth daily as needed for constipation      bisacodyl (DULCOLAX) 10 mg suppository Insert 10 mg into the rectum as needed for constipation      bortezomib (VELCADE) Infuse into a venous catheter once      clotrimazole (LOTRIMIN) 1 % cream Apply topically 2 (two) times a day  Qty: 30 g, Refills: 0    Associated Diagnoses: Left leg cellulitis      fluticasone-salmeterol (ADVAIR DISKUS, WIXELA INHUB) 250-50 mcg/dose inhaler Inhale 1 puff 2 (two) times a day Rinse mouth after use    Qty: 1 Inhaler, Refills: 5    Comments: Substitution to a formulary equivalent within the same pharmaceutical class is authorized  Associated Diagnoses: Moderate persistent asthma without complication      insulin aspart (NOVOLOG FLEXPEN) 100 Units/mL injection pen Inject 30 Units under the skin 3 (three) times a day with meals  Qty: 15 pen, Refills: 1    Associated Diagnoses: Uncontrolled type 2 diabetes mellitus with hyperglycemia (HCC)      insulin glargine (LANTUS) 100 units/mL subcutaneous injection Inject 18 Units under the skin daily at bedtime  Qty: 10 mL, Refills: 0    Associated Diagnoses: Acute renal failure superimposed on stage 3 chronic kidney disease, unspecified acute renal failure type ([facility] [address] ); Type 2 diabetes mellitus with left diabetic foot ulcer ([facility])      metFORMIN (GLUCOPHAGE) 1000 MG tablet       metoprolol tartrate (LOPRESSOR) 25 mg tablet TAKE 1 TABLET BY MOUTH EVERY 12 HOURS  Qty: 180 tablet, Refills: 3    Associated Diagnoses: Essential hypertension      torsemide (DEMADEX) 10 mg tablet Take 1 tablet (10 mg total) by mouth 2 (two) times a day    Associated Diagnoses: Localized edema      warfarin (COUMADIN) 7 5 mg tablet 7 5 mg on August 12th and 13th then INR on August 14th  Qty: 30 tablet, Refills: 0    Associated Diagnoses: Chronic atrial fibrillation           No discharge procedures on file      ED Provider  Electronically Signed by           Thurman Favre, MD  11/03/19 7650

## 2019-10-30 ENCOUNTER — APPOINTMENT (INPATIENT)
Dept: RADIOLOGY | Facility: HOSPITAL | Age: 69
DRG: 602 | End: 2019-10-30
Payer: COMMERCIAL

## 2019-10-30 ENCOUNTER — APPOINTMENT (INPATIENT)
Dept: ULTRASOUND IMAGING | Facility: HOSPITAL | Age: 69
DRG: 602 | End: 2019-10-30
Payer: COMMERCIAL

## 2019-10-30 PROBLEM — I12.9 HYPERTENSIVE CHRONIC KIDNEY DISEASE W STG 1-4/UNSP CHR KDNY: Status: ACTIVE | Noted: 2019-08-21

## 2019-10-30 PROBLEM — J44.9 CHRONIC OBSTRUCTIVE PULMONARY DISEASE, UNSPECIFIED (HCC): Status: ACTIVE | Noted: 2019-08-21

## 2019-10-30 LAB
ALBUMIN SERPL BCP-MCNC: 2.4 G/DL (ref 3.5–5)
ALP SERPL-CCNC: 349 U/L (ref 46–116)
ALT SERPL W P-5'-P-CCNC: 64 U/L (ref 12–78)
ANION GAP SERPL CALCULATED.3IONS-SCNC: 9 MMOL/L (ref 4–13)
AST SERPL W P-5'-P-CCNC: 42 U/L (ref 5–45)
ATRIAL RATE: 66 BPM
BASOPHILS # BLD AUTO: 0.05 THOUSANDS/ΜL (ref 0–0.1)
BASOPHILS NFR BLD AUTO: 1 % (ref 0–1)
BILIRUB SERPL-MCNC: 1.5 MG/DL (ref 0.2–1)
BUN SERPL-MCNC: 24 MG/DL (ref 5–25)
CALCIUM SERPL-MCNC: 8.1 MG/DL (ref 8.3–10.1)
CHLORIDE SERPL-SCNC: 105 MMOL/L (ref 100–108)
CO2 SERPL-SCNC: 28 MMOL/L (ref 21–32)
CREAT SERPL-MCNC: 1.21 MG/DL (ref 0.6–1.3)
EOSINOPHIL # BLD AUTO: 0.25 THOUSAND/ΜL (ref 0–0.61)
EOSINOPHIL NFR BLD AUTO: 4 % (ref 0–6)
ERYTHROCYTE [DISTWIDTH] IN BLOOD BY AUTOMATED COUNT: 16.3 % (ref 11.6–15.1)
GFR SERPL CREATININE-BSD FRML MDRD: 62 ML/MIN/1.73SQ M
GLUCOSE SERPL-MCNC: 103 MG/DL (ref 65–140)
GLUCOSE SERPL-MCNC: 112 MG/DL (ref 65–140)
GLUCOSE SERPL-MCNC: 193 MG/DL (ref 65–140)
GLUCOSE SERPL-MCNC: 80 MG/DL (ref 65–140)
GLUCOSE SERPL-MCNC: 96 MG/DL (ref 65–140)
HCT VFR BLD AUTO: 28.4 % (ref 36.5–49.3)
HGB BLD-MCNC: 8.5 G/DL (ref 12–17)
IMM GRANULOCYTES # BLD AUTO: 0.12 THOUSAND/UL (ref 0–0.2)
IMM GRANULOCYTES NFR BLD AUTO: 2 % (ref 0–2)
LYMPHOCYTES # BLD AUTO: 0.8 THOUSANDS/ΜL (ref 0.6–4.47)
LYMPHOCYTES NFR BLD AUTO: 11 % (ref 14–44)
MCH RBC QN AUTO: 28.2 PG (ref 26.8–34.3)
MCHC RBC AUTO-ENTMCNC: 29.9 G/DL (ref 31.4–37.4)
MCV RBC AUTO: 94 FL (ref 82–98)
MONOCYTES # BLD AUTO: 0.58 THOUSAND/ΜL (ref 0.17–1.22)
MONOCYTES NFR BLD AUTO: 8 % (ref 4–12)
NEUTROPHILS # BLD AUTO: 5.37 THOUSANDS/ΜL (ref 1.85–7.62)
NEUTS SEG NFR BLD AUTO: 74 % (ref 43–75)
NRBC BLD AUTO-RTO: 0 /100 WBCS
PLATELET # BLD AUTO: 327 THOUSANDS/UL (ref 149–390)
PMV BLD AUTO: 10.1 FL (ref 8.9–12.7)
POTASSIUM SERPL-SCNC: 4.1 MMOL/L (ref 3.5–5.3)
PROCALCITONIN SERPL-MCNC: 0.17 NG/ML
PROT SERPL-MCNC: 6.4 G/DL (ref 6.4–8.2)
QRS AXIS: 63 DEGREES
QRSD INTERVAL: 110 MS
QT INTERVAL: 400 MS
QTC INTERVAL: 458 MS
RBC # BLD AUTO: 3.01 MILLION/UL (ref 3.88–5.62)
SODIUM SERPL-SCNC: 142 MMOL/L (ref 136–145)
T WAVE AXIS: 35 DEGREES
TROPONIN I SERPL-MCNC: 0.16 NG/ML
VENTRICULAR RATE: 79 BPM
WBC # BLD AUTO: 7.17 THOUSAND/UL (ref 4.31–10.16)

## 2019-10-30 PROCEDURE — 94664 DEMO&/EVAL PT USE INHALER: CPT

## 2019-10-30 PROCEDURE — 80053 COMPREHEN METABOLIC PANEL: CPT | Performed by: NURSE PRACTITIONER

## 2019-10-30 PROCEDURE — G8988 SELF CARE GOAL STATUS: HCPCS

## 2019-10-30 PROCEDURE — 97535 SELF CARE MNGMENT TRAINING: CPT

## 2019-10-30 PROCEDURE — 97163 PT EVAL HIGH COMPLEX 45 MIN: CPT

## 2019-10-30 PROCEDURE — G0008 ADMIN INFLUENZA VIRUS VAC: HCPCS | Performed by: HOSPITALIST

## 2019-10-30 PROCEDURE — 93010 ELECTROCARDIOGRAM REPORT: CPT | Performed by: INTERNAL MEDICINE

## 2019-10-30 PROCEDURE — 94760 N-INVAS EAR/PLS OXIMETRY 1: CPT

## 2019-10-30 PROCEDURE — G8987 SELF CARE CURRENT STATUS: HCPCS

## 2019-10-30 PROCEDURE — 90662 IIV NO PRSV INCREASED AG IM: CPT | Performed by: HOSPITALIST

## 2019-10-30 PROCEDURE — 73620 X-RAY EXAM OF FOOT: CPT

## 2019-10-30 PROCEDURE — 99233 SBSQ HOSP IP/OBS HIGH 50: CPT | Performed by: HOSPITALIST

## 2019-10-30 PROCEDURE — G8978 MOBILITY CURRENT STATUS: HCPCS

## 2019-10-30 PROCEDURE — G8979 MOBILITY GOAL STATUS: HCPCS

## 2019-10-30 PROCEDURE — 94640 AIRWAY INHALATION TREATMENT: CPT

## 2019-10-30 PROCEDURE — 76705 ECHO EXAM OF ABDOMEN: CPT

## 2019-10-30 PROCEDURE — 85025 COMPLETE CBC W/AUTO DIFF WBC: CPT | Performed by: NURSE PRACTITIONER

## 2019-10-30 PROCEDURE — 82948 REAGENT STRIP/BLOOD GLUCOSE: CPT

## 2019-10-30 PROCEDURE — 97167 OT EVAL HIGH COMPLEX 60 MIN: CPT

## 2019-10-30 PROCEDURE — 84484 ASSAY OF TROPONIN QUANT: CPT | Performed by: NURSE PRACTITIONER

## 2019-10-30 RX ADMIN — INFLUENZA A VIRUS A/MICHIGAN/45/2015 X-275 (H1N1) ANTIGEN (FORMALDEHYDE INACTIVATED), INFLUENZA A VIRUS A/SINGAPORE/INFIMH-16-0019/2016 IVR-186 (H3N2) ANTIGEN (FORMALDEHYDE INACTIVATED), AND INFLUENZA B VIRUS B/MARYLAND/15/2016 BX-69A (A B/COLORADO/6/2017-LIKE VIRUS) ANTIGEN (FORMALDEHYDE INACTIVATED) 0.5 ML: 60; 60; 60 INJECTION, SUSPENSION INTRAMUSCULAR at 10:30

## 2019-10-30 RX ADMIN — ALLOPURINOL 300 MG: 300 TABLET ORAL at 10:22

## 2019-10-30 RX ADMIN — FUROSEMIDE 40 MG: 10 INJECTION, SOLUTION INTRAMUSCULAR; INTRAVENOUS at 17:00

## 2019-10-30 RX ADMIN — INSULIN LISPRO 30 UNITS: 100 INJECTION, SOLUTION INTRAVENOUS; SUBCUTANEOUS at 10:13

## 2019-10-30 RX ADMIN — FLUTICASONE FUROATE AND VILANTEROL TRIFENATATE 1 PUFF: 200; 25 POWDER RESPIRATORY (INHALATION) at 07:38

## 2019-10-30 RX ADMIN — FUROSEMIDE 40 MG: 10 INJECTION, SOLUTION INTRAMUSCULAR; INTRAVENOUS at 05:18

## 2019-10-30 RX ADMIN — ACYCLOVIR 400 MG: 800 TABLET ORAL at 10:19

## 2019-10-30 RX ADMIN — VANCOMYCIN HYDROCHLORIDE 1500 MG: 1 INJECTION, POWDER, LYOPHILIZED, FOR SOLUTION INTRAVENOUS at 04:13

## 2019-10-30 RX ADMIN — ATORVASTATIN CALCIUM 20 MG: 20 TABLET, FILM COATED ORAL at 10:23

## 2019-10-30 RX ADMIN — VANCOMYCIN HYDROCHLORIDE 1500 MG: 1 INJECTION, POWDER, LYOPHILIZED, FOR SOLUTION INTRAVENOUS at 16:46

## 2019-10-30 RX ADMIN — CEFEPIME HYDROCHLORIDE 1000 MG: 1 INJECTION, SOLUTION INTRAVENOUS at 03:27

## 2019-10-30 RX ADMIN — WARFARIN SODIUM 7.5 MG: 7.5 TABLET ORAL at 17:00

## 2019-10-30 RX ADMIN — CEFEPIME HYDROCHLORIDE 1000 MG: 1 INJECTION, SOLUTION INTRAVENOUS at 16:45

## 2019-10-30 RX ADMIN — FUROSEMIDE 40 MG: 10 INJECTION, SOLUTION INTRAMUSCULAR; INTRAVENOUS at 11:29

## 2019-10-30 RX ADMIN — INSULIN LISPRO 30 UNITS: 100 INJECTION, SOLUTION INTRAVENOUS; SUBCUTANEOUS at 16:55

## 2019-10-30 RX ADMIN — ALBUTEROL SULFATE 2 PUFF: 90 AEROSOL, METERED RESPIRATORY (INHALATION) at 11:04

## 2019-10-30 RX ADMIN — METOPROLOL TARTRATE 25 MG: 25 TABLET ORAL at 10:23

## 2019-10-30 RX ADMIN — INSULIN LISPRO 2 UNITS: 100 INJECTION, SOLUTION INTRAVENOUS; SUBCUTANEOUS at 12:13

## 2019-10-30 RX ADMIN — METOPROLOL TARTRATE 25 MG: 25 TABLET ORAL at 21:15

## 2019-10-30 NOTE — PROGRESS NOTES
Vancomycin Assessment    Nanette Garcia is a 81 y o  male who is currently receiving vancomycin 2250mg IV Q12H  for skin-soft tissue infection     Relevant clinical data and objective history reviewed:  Creatinine   Date Value Ref Range Status   10/29/2019 1 37 (H) 0 60 - 1 30 mg/dL Final     Comment:     Standardized to IDMS reference method   08/12/2019 1 46 (H) 0 60 - 1 30 mg/dL Final     Comment:     Standardized to IDMS reference method   08/11/2019 1 72 (H) 0 60 - 1 30 mg/dL Final     Comment:     Standardized to IDMS reference method   01/15/2018 0 99 0 70 - 1 25 mg/dL Final     Comment:     Result Comment: For patients >52years of age, the reference limit  for Creatinine is approximately 13% higher for people  identified as -American      05/25/2017 1 31 (H) 0 70 - 1 25 mg/dL Final     Comment:     Result Comment: For patients >52years of age, the reference limit  for Creatinine is approximately 13% higher for people  identified as -American      12/09/2016 1 18 0 70 - 1 25 mg/dL Final     Comment:     Result Comment: For patients >52years of age, the reference limit  for Creatinine is approximately 13% higher for people  identified as -American  CREATININE   Date Value Ref Range Status   08/16/2019 1 4  Final     Vancomycin Rm   Date Value Ref Range Status   08/02/2019 19 4 ug/mL Final     /60 (BP Location: Left arm)   Pulse 73   Temp (!) 100 8 °F (38 2 °C) (Oral)   Resp (!) 25   Ht 6' 3" (1 905 m)   Wt (!) 151 kg (331 lb 12 7 oz)   SpO2 94%   BMI 41 47 kg/m²   I/O last 3 completed shifts:   In: 48 [IV Piggyback:50]  Out: -   Lab Results   Component Value Date/Time    BUN 29 (H) 10/29/2019 02:24 PM    BUN 27 (H) 01/23/2018 02:29 PM    WBC 7 31 10/29/2019 02:24 PM    WBC 5 16 12/23/2015 06:00 AM    HGB 8 5 (L) 10/29/2019 02:24 PM    HGB 10 6 (L) 12/23/2015 06:00 AM    HCT 28 7 (L) 10/29/2019 02:24 PM    HCT 34 5 (L) 12/23/2015 06:00 AM    MCV 94 10/29/2019 02:24 PM    MCV 91 12/23/2015 06:00 AM     10/29/2019 02:24 PM     12/23/2015 06:00 AM     Temp Readings from Last 3 Encounters:   10/29/19 (!) 100 8 °F (38 2 °C) (Oral)   10/20/19 97 5 °F (36 4 °C) (Oral)   10/15/19 99 8 °F (37 7 °C) (Temporal)     Vancomycin Days of Therapy: 1    Assessment/Plan  The patient is currently on vancomycin utilizing scheduled dosing based on adjusted body weight (due to obesity)  Baseline risks associated with therapy include: concomitant nephrotoxic medications and advanced age  The patient is currently receiving 2250mg IV Q12H  and after clinical evaluation will be changed to 1500mg IV Q12H   Pharmacy will also follow closely for s/sx of nephrotoxicity, infusion reactions and appropriateness of therapy  BMP and CBC will be ordered per protocol  Plan for trough as patient approaches steady state, prior to the 4th  dose at approximately 0430 on 10/31/2019   Due to infection severity, will target a trough of 15-20 (appropriate for most indications)   Pharmacy will continue to follow the patients culture results and clinical progress daily      Connor Serrano Pharmacist

## 2019-10-30 NOTE — SOCIAL WORK
LOS: 1  Pt is not a documented bundle  Pt is not a 30 day readmission  Received case management consult re: post acute needs  Met with Pt  Pt's dtr at bedside  Discussed role of case management and discharge planning  Pt reports he is at [facility] assisted living  Pt reports that he has assistance with adls and uses walker and wheelchair for ambulation  Pt goes to  infusion [place] weekly for Valcade infusion  Pt reports he has living will and his POA is his son, Ayden Webb  Pt reports he has had VNA in past  Pt reports he has been to [facility] in past for SNF  Pt denies hx of mental health and drug and alcohol treatment   informed Pt of SNF recommendation  Pt is considering SNF and [facility]  but requested  to call his son, Ayden Webb  Call placed to Pt's son(Gerald: [phone]), left message re: discharge planning  Anticipate return call  Call placed to Bibi Rachel in wellness at [facility]),  informed Bibi Rachel of SNF recommendation  Bibi Rachel informed if SNF recommendation in hospital then facility would recommended Pt going to SNF for rehab  Await return call from Pt's son  Will follow

## 2019-10-30 NOTE — UTILIZATION REVIEW
Initial Clinical Review    Admission: Date/Time/Statement: Inpatient Admission Orders (From admission, onward)     Ordered        10/29/19 1906  Inpatient Admission (expected length of stay for this patient Order details is greater than two midnights)  Once                   Orders Placed This Encounter   Procedures    Inpatient Admission (expected length of stay for this patient Order details is greater than two midnights)     Standing Status:   Standing     Number of Occurrences:   1     Order Specific Question:   Admitting Physician     Answer:   Vicky Mayo [[id number]]     Order Specific Question:   Level of Care     Answer:   Med Surg [16]     Order Specific Question:   Estimated length of stay     Answer:   More than 2 Midnights     Order Specific Question:   Certification     Answer:   I certify that inpatient services are medically necessary for this patient for a duration of greater than two midnights  See H&P and MD Progress Notes for additional information about the patient's course of treatment  ED Arrival Information     Expected Arrival Acuity Means of Arrival Escorted By Service Admission Type    - 10/29/2019 13:40 Emergent Wheelchair Other (Comment) Hospitalist Emergency    Arrival Complaint    SOB, leg cellulitis        Chief Complaint   Patient presents with    Shortness of Breath     pt states being SOB since last night, came to infusion center today, was sent down  pt states he feels like hes in heart failure     Assessment/Plan: 81year old male, presented to the ED from the [facility] secondary to weakness unable to ambulate to receive infusion via wheelchair  Admitted as Inpatient due to cellulitis  Patient comes from nursing home and states that he has had increasing dyspnea on exertion over the last few weeks along with weakness resulting in falls  Multiple myeloma not having achieved remission:  Recently diagnosed 09/2019    Patient was scheduled for Velcade today however due to weakness presented to the emergency room  Oncology follow-up  Cellulitis:  Patient presents with worsening right lower extremity swelling with small blister on dorsal part of his right foot  WBC count 7 3, temperature 100 8°  Continue cefepime/vancomycin given prior wound cultures positive for MRSA and Proteus  Will check x-ray of the right lower extremity given the blister formation to rule out any underlying subcutaneous air  Procalcitonin  Blood cultures pending  Weakness:  Consult PT/OT  Weakness: In the setting of CHF concern for right-sided however prior echo with RV normal   Diurese-Will dose 40 mg of IV Lasix t i d  For goal net -1 to 2 L   CMP  Denies abdominal pain will check right upper quadrant ultrasound      ED Triage Vitals   Temperature Pulse Respirations Blood Pressure SpO2   10/29/19 1400 10/29/19 1352 10/29/19 1353 10/29/19 1353 10/29/19 1352   (!) 100 8 °F (38 2 °C) 88 (!) 25 138/62 95 %      Temp Source Heart Rate Source Patient Position - Orthostatic VS BP Location FiO2 (%)   10/29/19 2037 10/29/19 1352 10/29/19 1353 10/29/19 1353 --   Oral Monitor Lying Right arm       Pain Score       10/29/19 1352       No Pain        Wt Readings from Last 1 Encounters:   10/30/19 (!) 155 kg (341 lb 4 4 oz)     Additional Vital Signs:   Date/Time  Temp  Pulse  Resp  BP  SpO2  O2 Device  Patient Position - Orthostatic VS   10/30/19 0740  --  --  --  --  91 %  --  --   10/30/19 0726  99 2 °F (37 3 °C)  --  --  155/68  90 %  None (Room air)  Lying   10/29/19 2300  98 3 °F (36 8 °C)  63  18  111/53  92 %  None (Room air)  Lying   10/29/19 2100  --  --  --  --  --  None (Room air)  --   10/29/19 2037  100 8 °F (38 2 °C)Abnormal   73  25Abnormal   132/60  94 %  None (Room air)  Lying   10/29/19 1814  --  78  24Abnormal   138/68  91 %  None (Room air)  Sitting   10/29/19 1601  --  84  28Abnormal   137/62  99 %  None (Room air)  Sitting     Pertinent Labs/Diagnostic Test Results:   10/29 @ 1458  Chest X:  Low lung volumes with bibasilar atelectasis with no definite pneumonia  Retrograde cardiac opacity due to hiatal hernia  10/29/2019 @ 6629  CTa chest:  1   No evidence of acute pulmonary embolus embolus or thoracic aortic aneurysm   No acute cardiopulmonary process  2   Moderate to large hiatal hernia  10/29/2019 @ 1359  EC  Atrial fibrillation, Incomplete right bundle branch block, Nonspecific ST abnormality      Results from last 7 days   Lab Units 10/30/19  0521 10/29/19  1424   WBC Thousand/uL 7 17 7 31   HEMOGLOBIN g/dL 8 5* 8 5*   HEMATOCRIT % 28 4* 28 7*   PLATELETS Thousands/uL 327 340   NEUTROS ABS Thousands/µL 5 37 5 69     Results from last 7 days   Lab Units 10/30/19  0521 10/29/19  1424   SODIUM mmol/L 142 139   POTASSIUM mmol/L 4 1 4 6   CHLORIDE mmol/L 105 104   CO2 mmol/L 28 30   ANION GAP mmol/L 9 5   BUN mg/dL 24 29*   CREATININE mg/dL 1 21 1 37*   EGFR ml/min/1 73sq m 62 53   CALCIUM mg/dL 8 1* 8 3     Results from last 7 days   Lab Units 10/30/19  0521 10/29/19  1424   AST U/L 42 73*   ALT U/L 64 81*   ALK PHOS U/L 349* 394*   TOTAL PROTEIN g/dL 6 4 6 6   ALBUMIN g/dL 2 4* 2 4*   TOTAL BILIRUBIN mg/dL 1 50* 1 50*     Results from last 7 days   Lab Units 10/30/19  0708 10/29/19  2112   POC GLUCOSE mg/dl 103 81     Results from last 7 days   Lab Units 10/30/19  0521 10/29/19  1424   GLUCOSE RANDOM mg/dL 80 182*     Results from last 7 days   Lab Units 10/30/19  0041 10/29/19  2135 10/29/19  1424   TROPONIN I ng/mL 0 16* 0 16* 0 15*     Results from last 7 days   Lab Units 10/29/19  2135   PROTIME seconds 24 4*   INR  2 23*     Results from last 7 days   Lab Units 10/29/19  2135   PROCALCITONIN ng/ml 0 17     Results from last 7 days   Lab Units 10/29/19  1424   LACTIC ACID mmol/L 1 8     Results from last 7 days   Lab Units 10/29/19  1424   NT-PRO BNP pg/mL 2,827*     Results from last 7 days   Lab Units 10/29/19  1612   CLARITY UA  Clear   COLOR UA  Yellow   SPEC GRAV UA  1 015 PH UA  5 0   GLUCOSE UA mg/dl Negative   KETONES UA mg/dl Negative   BLOOD UA  Negative   PROTEIN UA mg/dl Negative   NITRITE UA  Negative   BILIRUBIN UA  Negative   UROBILINOGEN UA E U /dl 2 0*   LEUKOCYTES UA  Negative     ED Treatment:   Medication Administration from 10/29/2019 1340 to 10/29/2019 1948       Date/Time Order Dose Route Action     10/29/2019 1645 vancomycin (VANCOCIN) 2,000 mg in sodium chloride 0 9 % 500 mL IVPB 2,000 mg Intravenous New Bag     10/29/2019 1612 cefepime (MAXIPIME) 2 g/50 mL dextrose IVPB 2,000 mg Intravenous New Bag     10/29/2019 1759 iohexol (OMNIPAQUE) 350 MG/ML injection (MULTI-DOSE) 85 mL 85 mL Intravenous Given        Past Medical History:   Diagnosis Date    [facility]     CHF (congestive heart failure) (HCC)     Chronic kidney disease     COPD (chronic obstructive pulmonary disease) (HCC)     Decubitus ulcer of heel     LAST ASSESSED [id number]    [facility] mellitus [facility] )     History of varicose veins     Hypertension     [facility]termitt[facility]audication ([facility])     Neuropathy     Seasonal allergies      Present on Admission:   Diabetes mellitus type 2, uncontrolled (HCC)   Chronic atrial fibrillation   Acute on chronic diastolic congestive heart failure (HCC)   Morbid obesity (HCC)   Hyperlipidemia   Moderate persistent asthma without complication   Multiple myeloma not having achieved remission (HCC)   Cellulitis   Weakness   Transaminitis   Elevated troponin   SOB (shortness of breath)      Admitting Diagnosis: Cellulitis [L03 90]  SOB (shortness of breath) [R06 02]  Elevated troponin [R79 89]  Age/Sex: 81 y o  male  Admission Orders:  Medications:  acyclovir 400 mg Oral Daily   allopurinol 300 mg Oral Daily   atorvastatin 20 mg Oral Daily   cefepime 1,000 mg Intravenous Q12H   fluticasone-vilanterol 1 puff Inhalation Daily   furosemide 40 mg Intravenous TID (diuretic)   influenza vaccine 0 5 mL Intramuscular Once   insulin glargine 18 Units Subcutaneous HS   insulin lispro 1-6 Units Subcutaneous HS   insulin lispro 2-12 Units Subcutaneous TID AC   insulin lispro 30 Units Subcutaneous TID With Meals   metoprolol tartrate 25 mg Oral Q12H   vancomycin 1,500 mg Intravenous Q12H   warfarin 7 5 mg Oral Daily (warfarin)     acetaminophen 650 mg Oral Q6H PRN   albuterol 2 puff Inhalation Q6H PRN   bisacodyl 10 mg Rectal PRN   TELM    IP CONSULT TO WOUND CARE  IP CONSULT TO PHARMACY  IP CONSULT TO CASE MANAGEMENT    Network Utilization Review Department  [email or web address]  ATTENTION: Please call with any questions or concerns to [phone] and carefully listen to the prompts so that you are directed to the right person  All voicemails are confidential   Ishmael Nicholas all requests for admission clinical reviews, approved or denied determinations and any other requests to dedicated fax number below belonging to the campus where the patient is receiving treatment    FACILITY NAME UR FAX NUMBER   ADMISSION DENIALS (Administrative/Medical Necessity) [address] [facility] (Maternity/NICU/Pediatrics) [phone]   [facility] [address] [place] [address] [address] [facility] [phone]   [address] [place] [address] 1525  [phone]   [facility] [address] [phone]

## 2019-10-30 NOTE — ASSESSMENT & PLAN NOTE
· Patient presents with worsening right lower extremity swelling with small blister on dorsal part of his right foot  · WBC count 7 3, temperature 100 8° vitals are stable  · Continue cefepime/vancomycin given prior wound cultures positive for MRSA and Proteus  · Will check x-ray of the right lower extremity given the blister formation to rule out any underlying subcutaneous air  · Procalcitonin  · Blood cultures pending

## 2019-10-30 NOTE — ASSESSMENT & PLAN NOTE
· In the setting of CHF concern for right-sided however prior echo with RV normal  · Diurese  · CMP in the a m    · Denies abdominal pain however will check right upper quadrant ultrasound

## 2019-10-30 NOTE — PLAN OF CARE
Problem: PHYSICAL THERAPY ADULT  Goal: Performs mobility at highest level of function for planned discharge setting  See evaluation for individualized goals  Description  Treatment/Interventions: ADL retraining, Functional transfer training, LE strengthening/ROM, Patient/family training, Endurance training, Therapeutic exercise  Equipment Recommended: Wheelchair       See flowsheet documentation for full assessment, interventions and recommendations  Outcome: Progressing  Note:   Prognosis: Good  Problem List: Decreased strength, Decreased endurance, Impaired balance, Decreased mobility  Assessment: Pt presetns with R le weakenss adn imparied ability to transfer; previously was ind  with transfers  Adm for cellulitis and poss chf  Currently on fluid restriction and some sob with activity  REcomend use of drop arm commode in future  Can benefit form skilled TP serivces to regain safe fucntional mobilty  Will follow see goals  Recommendation: Short-term skilled PT     PT - OK to Discharge: Yes    See flowsheet documentation for full assessment

## 2019-10-30 NOTE — PROGRESS NOTES
Pastoral Care Progress Note    10/30/2019  Patient: Orlando Tinsley : 1950  Admission Date & Time: 10/29/2019 1343  MRN: [id number] [facility]: [id number]                     Chaplaincy Interventions Utilized:   Empowerment: Encouraged focus on present, Normalized experience of patient/family and Provided chaplaincy education    Exploration: Explored emotional needs & resources, Explored relational needs & resources, Explored spiritual needs & resources, Facilitated story telling and Identified, evaluated & reinforced appropriate coping strategies        Relationship Building: Cultivated a relationship of care and support, Listened empathically and Provided silent and supportive presence    Ritual: Provided prayer            Chaplaincy Outcomes Achieved:  Debriefed/defused experience, Distress reduced and Verbally processed emotions          Spiritual Coping Strategies Utilized:   Spiritual comfort and Spiritual struggle       10/30/19 1441 [place] active in  [address] Aware/Contacted Leader/Muslim aware of patient's status   Spiritual Beliefs/Perceptions   Concept of God Accepting   God's Role in Disease Test of jeanette   Relationship with God Close   Spiritual Strengths   (Active jeanette in God  Prayer  Supportive Yarsani )   Stress Factors   Patient Stress Factors Exhausted; Health changes; Loss of control; Other (Comment)  (Spiritual and emotional fatigue)   Family Stress Factors Health changes; Loss of control   Coping Responses   Patient Coping Anxiety; Fearful;Open/discussion   Family Coping Anger; Anxiety; Fearful   Plan of Care   Mariam Cobos will Follow   (Patient's  will provide onging support )   Assessment Completed by: Unit visit

## 2019-10-30 NOTE — MALNUTRITION/BMI
This medical record reflects one or more clinical indicators suggestive of malnutrition and/or morbid obesity  BMI Findings:  BMI Classifications: Morbid Obesity 40-44 9     Body mass index is 42 66 kg/m²  Treat with diet  See Nutrition note dated 10/30/19 for additional details  Completed nutrition assessment is viewable in the nutrition documentation

## 2019-10-30 NOTE — PHYSICAL THERAPY NOTE
PT eval   10/30/19 1050   Note Type   Note type Eval only   Pain Assessment   Pain Assessment No/denies pain   Pain Score No Pain   Home Living   Type of Home Assisted living   Home Layout Able to live on main level with bedroom/bathroom   Home Equipment Wheelchair-manual;Walker   Additional Comments was able to transfer ind pta wc<>bed, commode   Prior Function   Level of Prosperity Modified independent with wheelchair;Needs assistance with IADLs   Lives With Facility staff   Receives Help From Personal care attendant   ADL Assistance Independent   IADLs Needs assistance   Falls in the last 6 months 1 to 4   Vocational Retired   Restrictions/Precautions   Wells Emma Bearing Precautions Per Order No   Other Precautions   (L aka)   General   Additional Pertinent History 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   Family/Caregiver Present Yes   Cognition   Overall Cognitive Status WFL   Arousal/Participation Alert   Orientation Level Oriented X4   Comments anxosu states not able to stand as prior   RUE Assessment   RUE Assessment WFL   LUE Assessment   LUE Assessment WFL   RLE Assessment   RLE Assessment WFL  (marked edema strength 4-/5)   LLE Assessment   LLE Assessment WFL  (aka)   Coordination   Movements are Fluid and Coordinated 1   Bed Mobility   Supine to Sit 3  Moderate assistance   Additional items Assist x 1   Sit to Supine 3  Moderate assistance   Additional items Assist x 1   Transfers   Sit to Stand 2  Maximal assistance   Additional items Assist x 2   Stand to Sit 4  Minimal assistance   Additional items Assist x 2   Stand pivot 2  Maximal assistance   Additional items Assist x 3  (to L side attempted x 3 2 with walker one w/out)   Ambulation/Elevation   Gait pattern   (unable)   Balance   Static Sitting Normal   Dynamic Sitting Good   Static Standing Fair -   Dynamic Standing Poor +   Endurance Deficit   Endurance Deficit Yes   Endurance Deficit Description tires begore able to stand completely   Activity Tolerance   Activity Tolerance Patient limited by fatigue   Medical Staff Made Aware REGINO Ag   Nurse Made Aware yes   Assessment   Prognosis Good   Problem List Decreased strength;Decreased endurance; Impaired balance;Decreased mobility   Assessment Pt presetns with R neela weakenss adn imparied ability to transfer; previously was ind  with transfers  Adm for cellulitis and poss chf  Currently on fluid restriction and some sob with activity  REcomend use of drop arm commode in future  Can benefit form skilled TP serivces to regain safe fucntional mobilty  Will follow see goals  Goals   Patient Goals get better   [facility] Expiration Date 11/09/19   Short Term Goal #1 1) safe ind transfers wc<>bed, commode  Plan   Treatment/Interventions ADL retraining;Functional transfer training;LE strengthening/ROM; Patient/family training; Endurance training; Therapeutic exercise   PT Frequency 5x/wk   Recommendation   Recommendation Short-term skilled PT   Equipment Recommended Wheelchair   PT - OK to Discharge Yes   Additional Comments   (to STR withm edical clearance)   Barthel Index   Feeding 10   Bathing 0   Grooming Score 5   Dressing Score 5   Bladder Score 10   Bowels Score 10   Toilet Use Score 0   Transfers (Bed/Chair) Score 5   Mobility (Level Surface) Score 0   Stairs Score 0   Barthel Index Score 45   Tran Quezada, PT

## 2019-10-30 NOTE — ASSESSMENT & PLAN NOTE
Wt Readings from Last 3 Encounters:   10/29/19 (!) 151 kg (331 lb 12 7 oz)   10/20/19 (!) 159 kg (350 lb 8 5 oz)   10/15/19 (!) 159 kg (351 lb 6 6 oz)       · Prior echo with an EF of 27% grade 3 diastolic dysfunction  ·  Appears to be below weight however patient with +3 right lower extremity edema unclear acute on chronic  · BNP 2 827 up from prior at 1000  · CTA negative  · Will dose 40 mg of IV Lasix t i d   For goal net -1 to 2 L

## 2019-10-30 NOTE — OCCUPATIONAL THERAPY NOTE
Occupational Therapy Evaluation & Treatment Note  OT Eval [id number]; OT Tx [id number]      Heather Ng    10/30/2019    Principal Problem:    Cellulitis  Active Problems:    Diabetes mellitus type 2, uncontrolled ([facility] )    Chronic atrial fibrillation    Morbid obesity (HCC)    Acute on chronic diastolic congestive heart failure (HCC)    Hyperlipidemia    SOB (shortness of breath)    Moderate persistent asthma without complication    Multiple myeloma not having achieved remission (HCC)    Weakness    Transaminitis    Elevated troponin      Past Medical History:   Diagnosis Date    Cancer ([place] [address] )     CHF (congestive heart failure) (HCC)     Chronic kidney disease     [facility]ch[facility] disease) [facility]     Decubitus ulcer of heel     LAST ASSESSED [id number]    Diabetes mellitus ([place] [address] )     History of varicose veins     Hypertension     Intermittent claudication ([facility])     Neuropathy     Seasonal allergies        Past Surgical History:   Procedure Laterality Date    AMPUTATION ABOVE KNEE (AKA) Left 7/31/2019    Procedure: AMPUTATION ABOVE KNEE (AKA); Surgeon: Christopher Guillaume MD;  Location: [facility];  Service: General    ANGIOPLASTY      W/ FLUOROSC ANGIOGRAPGY PERIPHERAL ARTERY ADDITIONAL  LAST ASSESSED [id number]    ANGIOPLASTY / STENTING FEMORAL      TANSCATH INTRAVASCULAR STENT PLACEMENT PERCUTANEOUS FEMORAL     CT BONE MARROW BIOPSY AND ASPIRATION  8/9/2019    FULL THICKNESS SKIN GRAFT      TENDON REPAIR      TOE AMPUTATION Left 12/27/2018    Procedure: AMPUTATION left 4th TOE;  Surgeon: Yusra Lieberman DPM;  Location: [facility];  Service: Podiatry        10/30/19 1105   Note Type   Note type Eval/Treat   Restrictions/Precautions   Weight Bearing Precautions Per Order No   Other Precautions Telemetry; Fall Risk; Contact/isolation (MRSA)  (L AKA)   Pain Assessment   Pain Assessment No/denies pain   Home Living   Type of Home Assisted living  [facility])   Home Layout Able to live on main level with bedroom/bathroom   [address]   Prior Function   Level of [place] Modified independent with wheelchair;Needs assistance with IADLs  (Needs assist with ADLs)   Lives With Facility staff   Receives Help From Personal care attendant   ADL Assistance Needs assistance   IADLs Needs assistance   Falls in the last 6 months 1 to 4   Vocational Retired   Lifestyle   Autonomy Pt has been living at [facility] personal care unit where he is wheelchair bound but able to transfer himself     Psychosocial   Psychosocial (WDL) WDL   Length of Time/Family Visitation   (Pts significant other present, not involved during session)   ADL   Eating Assistance 7  Independent   Grooming Assistance 5  Supervision/Setup   UB Bathing Assistance 5  Supervision/Setup   LB Bathing Assistance 2  Maximal Assistance   UB Dressing Assistance 5  Supervision/Setup   LB Dressing Assistance 2  Maximal [address]  2  Maximal Assistance   Bed Mobility   Supine to Sit 2  Maximal assistance   Additional items Assist x 1   Sit to Supine 4  Minimal assistance   Additional items Assist x 1   Transfers   Sit to Stand 2  Maximal assistance   Additional items Assist x 2   Stand to Sit 3  Moderate assistance   Additional items Assist x 2   Stand pivot 2  Maximal assistance   Additional items Assist x 3  (multiple attempts required to transfer commode to bed)   Activity Tolerance   Activity Tolerance Patient limited by fatigue   Medical Staff Made Aware PT Rashmi Ugarte, [address] [facility], Nursing students present   Nurse Made Aware RN Brianna DAUGHERTY Assessment   RUE Assessment WFL   LUE Assessment   LUE Assessment WFL   Cognition   Overall Cognitive Status WFL   Arousal/Participation Alert   Attention Within functional limits   Orientation Level Oriented X4   Memory Within functional limits   Following Commands Follows one step commands with increased time or repetition   Comments Anxious, at times irritable  Once calm and transfer over, pt apologetic and thankful  Assessment   Limitation Decreased ADL status; Decreased Safe judgement during ADL;Decreased endurance;Decreased self-care trans   Prognosis Fair   Assessment Pt is a 81 y o  male seen for OT evaluation at [facility], admitted 10/29/2019 w/ Cellulitis  OT completed extensive review of pt's medical and social history  Comorbidities affecting pt's functional performance at time of assessment include: DM type 2, aFib, obesity, CHF, SOB, asthma, multiple myeloma, weakness, diabetic foot ulcer, HTN, cardiomyopathy, L AKA, COPD  Personal factors affecting pt at time of IE include:difficulty performing ADLS and health management   Prior to admission, pt was living at [facility] personal care and was able to transfer himself and complete UB ADLs  Upon evaluation, pt presents to OT below baseline due to the following performance deficits: weakness, decreased balance and decreased tolerance  Pt to benefit from continued skilled OT tx while in the hospital to address deficits as defined above and maximize level of functional independence w ADL's and functional mobility  Occupational Performance areas to address include: bathing/shower, toilet hygiene, dressing, functional mobility and functional transfers, bed mobility  Based on findings, pt is of high complexity  At this time, OT recommendations at time of discharge are short term rehab  Goals   Patient Goals Get better   Plan   Treatment Interventions ADL retraining;Functional transfer training; Endurance training;Patient/family training;Equipment evaluation/education; Compensatory technique education;Continued evaluation; Energy conservation   Goal Expiration Date 11/09/19   OT Frequency 2-3x/wk   Additional Treatment Session   Start Time 1050   End Time 3112   Treatment Assessment Patient participated in Skilled OT session this date with interventions consisting of ADL re training with the use of correct body mechnaics, safety awareness and fall prevention techniques,  functional transfers, bed mobility* and increase dynamic sit/ stand balance during functional activity    Patient agreeable to OT treatment session, upon arrival patient was found on commode (nursing helped with transfer)  Treatment session as follows: Pt approached on commode  Able to wash face/UB with setup  Able to don gown with assist to tie  Pt required max A to don R sock  Pt with difficulty getting off commode, becoming anxious and agitated and requiring multiple attempts with and without RW, and assist of 3-4 to transfer commode to EOB  Pt would benefit from use of drop arm commode for remainder of hospitalization; informed nursing of this recommendation  Pt able to sit EOB from approx 10-15 mins  Pt required Min A for sit to supine  Pt c/o shortness of breath; respiratory present to provide rescue inhaler  Patient continues to be functioning below baseline level, occupational performance remains limited secondary to factors listed above and increased risk for falls and injury  From OT standpoint, recommendation at time of d/c would be Short Term Rehab  Patient to benefit from continued Occupational Therapy treatment while in the hospital to address deficits as defined above and maximize level of functional independence with ADLs and functional mobility  Pt left with call bell in reach, nursing student and significant other present  Recommendation   OT Discharge Recommendation Short Term Rehab   OT - OK to Discharge Yes  (to STR when medically stable)   Barthel Index   Feeding 10   Bathing 0   Grooming Score 5   Dressing Score 5   Bladder Score 10   Bowels Score 10   Toilet Use Score 0   Transfers (Bed/Chair) Score 5   Mobility (Level Surface) Score 0   Stairs Score 0   Barthel Index Score 45     Pt will achieve the following goals within 10 days      *Pt will complete grooming with Mod I     *Pt will complete UB bathing and dressing with Mod I     *Pt will complete LB bathing and dressing with Min A      *Pt will complete toileting (hygiene and clothing management) with Min A    *Pt will complete bed mobility with Mod I, with bed flat and no side rail to prep for purposeful tasks    *Pt will perform functional transfers with supervision in order to complete ADL routine  *Pt will increase standing tolerance to 2 minutes in order to complete hygiene  *Pt will demonstrate increased activity tolerance in order to complete ADL routine  *Pt will participate in UE therapeutic exercise in order to maximize strength for ADL transfers      [employer], OT

## 2019-10-30 NOTE — ASSESSMENT & PLAN NOTE
Wt Readings from Last 3 Encounters:   10/30/19 (!) 155 kg (341 lb 4 4 oz)   10/20/19 (!) 159 kg (350 lb 8 5 oz)   10/15/19 (!) 159 kg (351 lb 6 6 oz)       · Prior echo with an EF of 91% grade 3 diastolic dysfunction  ·  Appears to be below weight however patient with +3 right lower extremity edema unclear acute on chronic  · BNP 2 827 up from prior at 1000  · CTA negative  · Will dose 40 mg of IV Lasix t i d   For goal net -1 to 2 L

## 2019-10-30 NOTE — ASSESSMENT & PLAN NOTE
· Patient states dyspnea on exertion worsening over the last few weeks  · CTA negative for PE  · Positive lower extremity edema  · Saturations 97% on room air  · Will diuresed for goal net -1 to 2 L

## 2019-10-30 NOTE — PROGRESS NOTES
Progress Note - Irma Koenig 1950, 81 y o  male MRN: [id number]    Unit/Bed#: [address] [place] [address] Encounter: [id number]    Primary Care Provider: Vineet Portillo MD   Date and time admitted to hospital: 10/29/2019  1:43 PM        Elevated troponin  Assessment & Plan  · EKG with AFib no acute ST changes  · troponins indeterminate (non-MI trop elevation)     Transaminitis  Assessment & Plan  · In the setting of CHF concern for right-sided however prior echo with RV normal  · Diurese  · CMP in the a m  · Denies abdominal pain however will check right upper quadrant ultrasound    Weakness  Assessment & Plan  · Will consult PT/OT no focal deficits    Multiple myeloma not having achieved remission ([facility] )  Assessment & Plan  · Recently diagnosed 09/2019  · Patient was scheduled for Velcade today however due to weakness presented to the emergency room  · Outpatient Oncology follow-up    Moderate persistent asthma without complication  Assessment & Plan  · Without acute exacerbation  · Continue home Advair    SOB (shortness of breath)  Assessment & Plan  · Patient states dyspnea on exertion worsening over the last few weeks  · CTA negative for PE  · Positive lower extremity edema  · Saturations 97% on room air  · Will diuresed for goal net -1 to 2 L    Hyperlipidemia  Assessment & Plan  · Continue home statin    Acute on chronic diastolic congestive heart failure (HCC)  Assessment & Plan  Wt Readings from Last 3 Encounters:   10/30/19 (!) 155 kg (341 lb 4 4 oz)   10/20/19 (!) 159 kg (350 lb 8 5 oz)   10/15/19 (!) 159 kg (351 lb 6 6 oz)       · Prior echo with an EF of 63% grade 3 diastolic dysfunction  ·  Appears to be below weight however patient with +3 right lower extremity edema unclear acute on chronic  · BNP 2 827 up from prior at 1000  · CTA negative  · Will dose 40 mg of IV Lasix t i d   For goal net -1 to 2 L      Morbid obesity ([facility] )  Assessment & Plan  · Dietary education    Chronic atrial fibrillation  Assessment & Plan  · Continue home beta-blocker/Coumadin    Diabetes mellitus type 2, uncontrolled ([facility] )  Assessment & Plan  Lab Results   Component Value Date    HGBA1C 9 4 (H) 2018       Recent Labs     10/29/19  2112 10/30/19  0708 10/30/19  1105   POCGLU 81 103 193*       Blood Sugar Average: Last 72 hrs:  · (P) 237 [id number] continue Lantus/sliding-scale insulin  · Hold home metformin    * Cellulitis  Assessment & Plan  · Patient presents with worsening right lower extremity swelling with small blister on dorsal part of his right foot  · WBC count 7 3, temperature 100 8° vitals are stable  · Continue cefepime/vancomycin given prior wound cultures positive for MRSA and Proteus  · Will check x-ray of the right lower extremity given the blister formation to rule out any underlying subcutaneous air  · Procalcitonin 0 17  · Blood cultures pending      VTE Pharmacologic Prophylaxis:   Pharmacologic: Warfarin (Coumadin)  Mechanical VTE Prophylaxis in Place: Yes    Patient Centered Rounds: I have performed bedside rounds with nursing staff today  Education and Discussions with Family / Patient: Pt    Time Spent for Care: 20 minutes  More than 50% of total time spent on counseling and coordination of care as described above  Current Length of Stay: 1 day(s)    Current Patient Status: Inpatient   Certification Statement: The patient will continue to require additional inpatient hospital stay due to RLE cellulitis    Discharge Plan: 3-6 days    Code Status: Level 1 - Full Code      Subjective:   Pt without new complaints  Objective:     Vitals:   Temp (24hrs), Av 8 °F (37 7 °C), Min:98 3 °F (36 8 °C), Max:100 8 °F (38 2 °C)    Temp:  [98 3 °F (36 8 °C)-100 8 °F (38 2 °C)] 99 2 °F (37 3 °C)  HR:  [63-88] 63  Resp:  [18-28] 18  BP: (111-155)/(53-68) 155/68  SpO2:  [90 %-99 %] 92 %  Body mass index is 42 66 kg/m²  Input and Output Summary (last 24 hours):        Intake/Output Summary (Last 24 hours) at 10/30/2019 1323  Last data filed at 10/30/2019 1059  Gross per 24 hour   Intake 1090 ml   Output 2275 ml   Net -1185 ml       Physical Exam:     Physical Exam  Gen: NAD, AAOx3, well developed, well nourished  Eyes: EOMI, PERRLA, no scleral icterus  ENMT:  no nasal discharge, no otic discharge, moist mucous membranes  Neck:  Supple  Cardiovascular:  Regular rate and rhythm, normal S1-S2, no murmurs, rubs, or gallops  Lungs:  Clear to auscultation bilaterally, no wheezes, or rales, or rhonchi  Abdomen:  Positive bowel sounds, soft, nontender, nondistended, no palpable organomegaly   Skin: RLE cellulitis, 3+ LE edema  Neuro: Cranial nerves 2-12 are intact, non-focal, moves all 4 extremities      Additional Data:     Labs:    Results from last 7 days   Lab Units 10/30/19  0521   WBC Thousand/uL 7 17   HEMOGLOBIN g/dL 8 5*   HEMATOCRIT % 28 4*   PLATELETS Thousands/uL 327   NEUTROS PCT % 74   LYMPHS PCT % 11*   MONOS PCT % 8   EOS PCT % 4     Results from last 7 days   Lab Units 10/30/19  0521   SODIUM mmol/L 142   POTASSIUM mmol/L 4 1   CHLORIDE mmol/L 105   CO2 mmol/L 28   BUN mg/dL 24   CREATININE mg/dL 1 21   ANION GAP mmol/L 9   CALCIUM mg/dL 8 1*   ALBUMIN g/dL 2 4*   TOTAL BILIRUBIN mg/dL 1 50*   ALK PHOS U/L 349*   ALT U/L 64   AST U/L 42   GLUCOSE RANDOM mg/dL 80     Results from last 7 days   Lab Units 10/29/19  2135   INR  2 23*     Results from last 7 days   Lab Units 10/30/19  1105 10/30/19  0708 10/29/19  2112   POC GLUCOSE mg/dl 193* 103 81         Results from last 7 days   Lab Units 10/29/19  2135 10/29/19  1424   LACTIC ACID mmol/L  --  1 8   PROCALCITONIN ng/ml 0 17  --            * I Have Reviewed All Lab Data Listed Above  * Additional Pertinent Lab Tests Reviewed:  [facility] Admission Reviewed    Recent Cultures (last 7 days):           Last 24 Hours Medication List:     Current Facility-Administered Medications:  acetaminophen 650 mg Oral Q6H PRN NICOLA Rojo    acyclovir 400 mg Oral Daily NICOLA Rojo    albuterol 2 puff Inhalation Q6H PRN NICOLA Rojo    allopurinol 300 mg Oral Daily NICOLA Pfeiffer    atorvastatin 20 mg Oral Daily NICOLA Pfeiffer    bisacodyl 10 mg Rectal PRN NICOLA Rojo    cefepime 1,000 mg Intravenous Q12H NICOLA Rojo Last Rate: Stopped (10/30/19 0411)   fluticasone-vilanterol 1 puff Inhalation Daily NICOLA Pfeiffer    furosemide 40 mg Intravenous TID (diuretic) NICOLA Rojo    insulin glargine 18 Units Subcutaneous HS NICOLA Pfeiffer    insulin lispro 1-6 Units Subcutaneous HS NICOLA Pfeiffer    insulin lispro 2-12 Units Subcutaneous TID AC NICOLA Pfeiffer    insulin lispro 30 Units Subcutaneous TID With Meals NICOLA Rojo    metoprolol tartrate 25 mg Oral Q12H NICOLA Pfeiffer    vancomycin 1,500 mg Intravenous Q12H NICOLA Pfeiffer Last Rate: 1,500 mg (10/30/19 0413)   warfarin 7 5 mg Oral Daily (warfarin) NICOLA Rojo         Today, Patient Was Seen By: Igor Chua MD    ** Please Note: Dictation voice to text software may have been used in the creation of this document   **

## 2019-10-30 NOTE — ASSESSMENT & PLAN NOTE
Lab Results   Component Value Date    HGBA1C 9 4 (H) 12/27/2018       No results for input(s): POCGLU in the last 72 hours      Blood Sugar Average: Last 72 hrs:  ·  continue Lantus/sliding-scale insulin  · Hold home metformin

## 2019-10-30 NOTE — PLAN OF CARE
Problem: OCCUPATIONAL THERAPY ADULT  Goal: Performs self-care activities at highest level of function for planned discharge setting  See evaluation for individualized goals  Outcome: Progressing  Note:   Limitation: Decreased ADL status, Decreased Safe judgement during ADL, Decreased endurance, Decreased self-care trans  Prognosis: Fair  Assessment: Pt is a 81 y o  male seen for OT evaluation at [facility], admitted 10/29/2019 w/ Cellulitis  OT completed extensive review of pt's medical and social history  Comorbidities affecting pt's functional performance at time of assessment include: DM type 2, aFib, obesity, CHF, SOB, asthma, multiple myeloma, weakness, diabetic foot ulcer, HTN, cardiomyopathy, L AKA, COPD  Personal factors affecting pt at time of IE include:difficulty performing ADLS and health management   Prior to admission, pt was living at [facility] personal care and was able to transfer himself and complete UB ADLs  Upon evaluation, pt presents to OT below baseline due to the following performance deficits: weakness, decreased balance and decreased tolerance  Pt to benefit from continued skilled OT tx while in the hospital to address deficits as defined above and maximize level of functional independence w ADL's and functional mobility  Occupational Performance areas to address include: bathing/shower, toilet hygiene, dressing, functional mobility and functional transfers, bed mobility  Based on findings, pt is of high complexity  At this time, OT recommendations at time of discharge are short term rehab       OT Discharge Recommendation: Short Term Rehab  OT - OK to Discharge: Yes(to STR when medically stable)

## 2019-10-30 NOTE — ASSESSMENT & PLAN NOTE
· Recently diagnosed 09/2019  · Patient was scheduled for Velcade today however due to weakness presented to the emergency room  · Outpatient Oncology follow-up

## 2019-10-30 NOTE — ASSESSMENT & PLAN NOTE
· Patient presents with worsening right lower extremity swelling with small blister on dorsal part of his right foot  · WBC count 7 3, temperature 100 8° vitals are stable  · Continue cefepime/vancomycin given prior wound cultures positive for MRSA and Proteus  · Will check x-ray of the right lower extremity given the blister formation to rule out any underlying subcutaneous air  · Procalcitonin 0 17  · Blood cultures pending

## 2019-10-30 NOTE — H&P
H&P- Kalani Hair 1950, 81 y o  male MRN: [id number]    Unit/Bed#: [address] [place] [address] Encounter: [id number]    Primary Care Provider: Meliza Leary MD   Date and time admitted to hospital: 10/29/2019  1:43 PM        * Cellulitis  Assessment & Plan  · Patient presents with worsening right lower extremity swelling with small blister on dorsal part of his right foot  · WBC count 7 3, temperature 100 8° vitals are stable  · Continue cefepime/vancomycin given prior wound cultures positive for MRSA and Proteus  · Will check x-ray of the right lower extremity given the blister formation to rule out any underlying subcutaneous air  · Procalcitonin  · Blood cultures pending    Weakness  Assessment & Plan  · Will consult PT/OT no focal deficits    Transaminitis  Assessment & Plan  · In the setting of CHF concern for right-sided however prior echo with RV normal  · Diurese  · CMP in the a m  · Denies abdominal pain however will check right upper quadrant ultrasound    Acute on chronic diastolic congestive heart failure (HCC)  Assessment & Plan  Wt Readings from Last 3 Encounters:   10/29/19 (!) 151 kg (331 lb 12 7 oz)   10/20/19 (!) 159 kg (350 lb 8 5 oz)   10/15/19 (!) 159 kg (351 lb 6 6 oz)       · Prior echo with an EF of 84% grade 3 diastolic dysfunction  ·  Appears to be below weight however patient with +3 right lower extremity edema unclear acute on chronic  · BNP 2 827 up from prior at 1000  · CTA negative  · Will dose 40 mg of IV Lasix t i d   For goal net -1 to 2 L      Elevated troponin  Assessment & Plan  · EKG with AFib no acute ST changes  · Will repeat troponin x3    SOB (shortness of breath)  Assessment & Plan  · Patient states dyspnea on exertion worsening over the last few weeks  · CTA negative for PE  · Positive lower extremity edema  · Saturations 97% on room air  · Will diuresed for goal net -1 to 2 L    Chronic atrial fibrillation  Assessment & Plan  · Continue home beta-blocker/Coumadin    Diabetes mellitus type 2, uncontrolled ([facility] )  Assessment & Plan  Lab Results   Component Value Date    HGBA1C 9 4 (H) 12/27/2018       No results for input(s): POCGLU in the last 72 hours  Blood Sugar Average: Last 72 hrs:  ·  continue Lantus/sliding-scale insulin  · Hold home metformin    Morbid obesity ([facility] [address] )  Assessment & Plan  · Dietary education    Hyperlipidemia  Assessment & Plan  · Continue home statin    Moderate persistent asthma without complication  Assessment & Plan  · Without acute exacerbation  · Continue home Advair    Multiple myeloma not having achieved remission [facility])  Assessment & Plan  · Recently diagnosed 09/2019  · Patient was scheduled for Velcade today however due to weakness presented to the emergency room  · Outpatient Oncology follow-up      VTE Prophylaxis: Warfarin (Coumadin)  / sequential compression device   Code Status:  Full code  POLST: POLST form is not discussed and not completed at this time  Discussion with family:  Patient/child    Anticipated Length of Stay:  Patient will be admitted on an Inpatient basis with an anticipated length of stay of  greater than 2 midnights  Justification for Hospital Stay:  Cellulitis    Total Time for Visit, including Counseling / Coordination of Care: 45 minutes  Greater than 50% of this total time spent on direct patient counseling and coordination of care  Chief Complaint:   Weakness    History of Present Illness:    Issa Champion is a 81 y o  male with past medical history of recent diagnosis of multiple myeloma on Velcade, dm 2, hypertension, diastolic CHF, asthma, hyperlipidemia, AFib on Coumadin who presents with from [facility] secondary to weakness unable to ambulate to receive infusion  Patient comes from nursing home and states that he has had increasing dyspnea on exertion over the last few weeks along with weakness resulting in falls    Today he felt like was unable to ambulate to receive his infusion  Denies any dizziness/lightheadedness/chest pain/cough/fever/urinary symptoms/abdominal pain  Positive for chills/dyspnea on exertion/lower extremity edema with redness    Review of Systems:    Review of Systems   Constitutional: Positive for chills  Negative for appetite change, fatigue and fever  HENT: Negative  Eyes: Negative  Respiratory: Negative  Cardiovascular: Positive for leg swelling  Gastrointestinal: Negative  Endocrine: Negative  Genitourinary: Negative  Musculoskeletal: Negative  Skin: Positive for color change and wound  Allergic/Immunologic: Negative  Neurological: Positive for weakness  Negative for dizziness and light-headedness  Hematological: Negative  Psychiatric/Behavioral: Negative  Past Medical and Surgical History:     Past Medical History:   Diagnosis Date    Cancer [facility])     CHF [facility]congestive heart failure) ([facility])     Chronic kidney disease     COPD (chronic obstructive pulmonary disease) ([facility])     Decubitus ulcer of heel     LAST ASSESSED [id number]    Diabetes mellitus (HCC)     History of varicose veins     Hypertension     Intermittent claudication ([facility])     Neuropathy     Seasonal allergies        Past Surgical History:   Procedure Laterality Date    AMPUTATION ABOVE KNEE (AKA) Left 7/31/2019    Procedure: AMPUTATION ABOVE KNEE (AKA);   Surgeon: Chris Bradley MD;  Location:  MAIN OR;  Service: General    ANGIOPLASTY      W/ FLUOROSC ANGIOGRAPGY PERIPHERAL ARTERY ADDITIONAL  LAST ASSESSED [id number]    ANGIOPLASTY / STENTING FEMORAL      TANSCATH INTRAVASCULAR STENT PLACEMENT PERCUTANEOUS FEMORAL     CT BONE MARROW BIOPSY AND ASPIRATION  8/9/2019    FULL THICKNESS SKIN GRAFT      TENDON REPAIR      TOE AMPUTATION Left 12/27/2018    Procedure: AMPUTATION left 4th TOE;  Surgeon: Storm Delacruz DPM;  Location:  MAIN OR;  Service: Podiatry       Meds/Allergies:    Prior to Admission medications    Medication Sig Start Date End Date Taking?  Authorizing Provider   acetaminophen (TYLENOL) 500 mg tablet Take 1 tablet (500 mg total) by mouth every 6 (six) hours as needed for mild pain, headaches or fever 10/21/19   Adelina Houston MD   acyclovir (ZOVIRAX) 400 MG tablet Take 1 tablet (400 mg total) by mouth daily 10/1/19 9/30/20  Fadia Zendejas MD   albuterol (PROVENTIL HFA,VENTOLIN HFA) 90 mcg/act inhaler Inhale 2 puffs every 6 (six) hours as needed for wheezing 4/26/19   Adelina Houston MD   Alcohol Swabs (ALCOHOL PREP) 70 % PADS  9/18/19   Historical Provider, MD   allopurinol (ZYLOPRIM) 300 mg tablet TAKE 1 TABLET BY MOUTH DAILY 3/1/19   Adelina Houston MD   ammonium lactate (LAC-HYDRIN) 12 % lotion  9/30/19   Historical Provider, MD   atorvastatin (LIPITOR) 20 mg tablet TAKE 1 TABLET BY MOUTH ONCE DAILY 5/7/19   Adelina Houston MD   BD INSULIN SYRINGE U/F 31G X 5/16" 0 5 ML MISC USE AS DIRECTED WITH NOVOLIN 70/30 12/29/18   Historical Provider, MD   BD PEN NEEDLE HILARIO U/F 32G X 4 MM MISC by Other route 3 (three) times a day 6/28/19   Adelina Houston MD   bisacodyl (bisacodyl) 5 mg EC tablet Take 5 mg by mouth daily as needed for constipation    Historical Provider, MD   bisacodyl (DULCOLAX) 10 mg suppository Insert 10 mg into the rectum as needed for constipation    Historical Provider, MD   bortezomib (VELCADE) Infuse into a venous catheter once    Historical Provider, MD   clotrimazole (LOTRIMIN) 1 % cream Apply topically 2 (two) times a day 8/12/19   Bruce Hinkle MD   fluticasone-salmeterol (ADVAIR DISKUS, WIXELA INHUB) 250-50 mcg/dose inhaler Inhale 1 puff 2 (two) times a day Rinse mouth after use  7/24/19   Adelina Houston MD   insulin aspart (NOVOLOG FLEXPEN) 100 Units/mL injection pen Inject 30 Units under the skin 3 (three) times a day with meals 7/22/19   Adelina Houston MD   insulin glargine (LANTUS) 100 units/mL subcutaneous injection Inject 18 Units under the skin daily at bedtime 8/12/19   Bruce Hinkle MD   Insulin Pen Needle 31G X 5 MM MISC by Does not apply route 2 (two) times a day for 50 days 12/28/18 10/8/19  Jean-Claude Eldridge PA-C   metFORMIN (GLUCOPHAGE) 1000 MG tablet  9/3/19   Historical Provider, MD   metoprolol tartrate (LOPRESSOR) 25 mg tablet TAKE 1 TABLET BY MOUTH EVERY 12 HOURS 9/30/18   Macy Hart MD   torsemide [facility]) 10 mg tablet Take 1 tablet (10 mg total) by mouth 2 (two) times a day 8/19/19   Jet Florse DO   warfarin (COUMADIN) 7 5 mg tablet 7 5 mg on August 12th and 13th then INR on August 14th 8/12/19   Yoni Amaya MD     I have reveiwed home medications using records provided by [facility]  Allergies:    Allergies   Allergen Reactions    Latex Rash       Social History:     Marital Status:    Occupation:  Retired  Patient Pre-hospital Living Situation:  Nursing facility  Patient Pre-hospital Level of Mobility:  Ambulatory with assistive devices primarily wheelchair  Patient Pre-hospital Diet Restrictions:  Cardiac/diabetic  Substance Use History:   Social History     Substance and Sexual Activity   Alcohol Use Not Currently     Social History     Tobacco Use   Smoking Status Never Smoker   Smokeless Tobacco Never Used     Social History     Substance and Sexual Activity   Drug Use Not Currently       Family History:    Family History   Problem Relation Age of Onset    Other Mother         CARDIAC DISORDER     Diabetes Mother     Cancer Father     Other Family         CARDIAC DISORDER     Diabetes Family     Cancer Family     Mental illness Family     Kidney disease Sister        Physical Exam:     Vitals:   Blood Pressure: 132/60 (10/29/19 2037)  Pulse: 73 (10/29/19 2037)  Temperature: (!) 100 8 °F (38 2 °C) (10/29/19 2037)  Temp Source: Oral (10/29/19 2037)  Respirations: (!) 25 (10/29/19 2037)  Height: 6' 3" (190 5 cm) (10/29/19 2037)  Weight - Scale: (!) 151 kg (331 lb 12 7 oz) (10/29/19 2037)  SpO2: 94 % (10/29/19 2037)    Physical Exam   Constitutional: He is oriented to person, place, and time  He appears well-nourished  No distress  HENT:   Head: Normocephalic and atraumatic  Mouth/Throat: Oropharynx is clear and moist    Eyes: Pupils are equal, round, and reactive to light  Conjunctivae are normal  No scleral icterus  Neck: Neck supple  No tracheal deviation present  Cardiovascular: Normal rate, normal heart sounds and intact distal pulses  An irregular rhythm present  Exam reveals no gallop and no friction rub  No murmur heard  Pulmonary/Chest: Effort normal and breath sounds normal  No respiratory distress  He has no wheezes  He has no rales  Abdominal: Soft  Bowel sounds are normal  He exhibits no distension  There is no tenderness  Musculoskeletal: Normal range of motion  He exhibits no edema, tenderness or deformity  Left BKA   Neurological: He is alert and oriented to person, place, and time  No cranial nerve deficit  Skin: Skin is warm and dry  No rash noted  There is erythema  No pallor  Right dorsal portion of the foot with small blister and reddened   Psychiatric: He has a normal mood and affect  His behavior is normal            Additional Data:     Lab Results: I have personally reviewed pertinent reports  Results from last 7 days   Lab Units 10/29/19  1424   WBC Thousand/uL 7 31   HEMOGLOBIN g/dL 8 5*   HEMATOCRIT % 28 7*   PLATELETS Thousands/uL 340   NEUTROS PCT % 77*   LYMPHS PCT % 11*   MONOS PCT % 7   EOS PCT % 3     Results from last 7 days   Lab Units 10/29/19  1424   SODIUM mmol/L 139   POTASSIUM mmol/L 4 6   CHLORIDE mmol/L 104   CO2 mmol/L 30   BUN mg/dL 29*   CREATININE mg/dL 1 37*   ANION GAP mmol/L 5   CALCIUM mg/dL 8 3   ALBUMIN g/dL 2 4*   TOTAL BILIRUBIN mg/dL 1 50*   ALK PHOS U/L 394*   ALT U/L 81*   AST U/L 73*   GLUCOSE RANDOM mg/dL 182*                 Results from last 7 days   Lab Units 10/29/19  1424   LACTIC ACID mmol/L 1 8       Imaging: I have personally reviewed pertinent reports     and I have personally reviewed pertinent films in PACS    CTA ED chest PE Study   Final Result by John Guzmán MD (10/29 1811)         1  No evidence of acute pulmonary embolus embolus or thoracic aortic aneurysm  No acute cardiopulmonary process  2   Moderate to large hiatal hernia  Workstation performed: [id number]         XR chest 2 views   Final Result by Elise Puentes MD (10/29 0992)      Low lung volumes with bibasilar atelectasis with no definite pneumonia  Retrograde cardiac opacity due to hiatal hernia  Workstation performed: [id number]         XR foot 2 vw right    (Results Pending)   US right upper quadrant    (Results Pending)       EKG, Pathology, and Other Studies Reviewed on Admission:   · EKG:  AFib no acute ST changes    Allscripts / Epic Records Reviewed: Yes     ** Please Note: This note has been constructed using a voice recognition system   **

## 2019-10-30 NOTE — ASSESSMENT & PLAN NOTE
Lab Results   Component Value Date    HGBA1C 9 4 (H) 12/27/2018       Recent Labs     10/29/19  2112 10/30/19  0708 10/30/19  1105   POCGLU 81 103 193*       Blood Sugar Average: Last 72 hrs:  · (P) 524 [id number] continue Lantus/sliding-scale insulin  · Hold home metformin

## 2019-10-30 NOTE — PROGRESS NOTES
Vancomycin IV Pharmacy-to-Dose Consultation    Huy Santillan is a 81 y o  male who is currently receiving Vancomycin IV with management by the Pharmacy Consult service  Assessment/Plan:  The patient was reviewed  Renal function is stable and no signs or symptoms of nephrotoxicity and/or infusion reactions were documented in the chart  Based on todays assessment, continue current vancomycin (day # 2) dosing of 1500 mg q12h with a plan for trough to be drawn at 0430 on 10/31/19  We will continue to follow the patients culture results and clinical progress daily      Raquel Iglesias, Pharmacist

## 2019-10-30 NOTE — QUICK NOTE
RUQ U/S results reviewed  Based on potential findings and elevated LFTs, will check MRCP and c/s GI

## 2019-10-30 NOTE — PROGRESS NOTES
10/30/19 1441 [facility] Daisy active in [address] Aware/Contacted Leader/Advent aware of patient's status   Spiritual Beliefs/Perceptions   Concept of God Accepting   God's Role in Disease Test of jeanette   Relationship with God Close   Spiritual Strengths   (Active jeanette in God  Prayer  Supportive Evangelical )   Stress Factors   Patient Stress Factors Exhausted; Health changes; Loss of control; Other (Comment)  (Spiritual and emotional fatigue)   Family Stress Factors Health changes; Loss of control   Coping Responses   Patient Coping Anxiety; Fearful;Open/discussion   Family Coping Anger; Anxiety; Fearful   Plan of Care   Antwan Pino will Follow   (Patient's  will provide onging support )   Assessment Completed by: Unit visit

## 2019-10-31 ENCOUNTER — APPOINTMENT (INPATIENT)
Dept: MRI IMAGING | Facility: HOSPITAL | Age: 69
DRG: 602 | End: 2019-10-31
Payer: COMMERCIAL

## 2019-10-31 LAB
ALBUMIN SERPL BCP-MCNC: 2.4 G/DL (ref 3.5–5)
ALP SERPL-CCNC: 356 U/L (ref 46–116)
ALT SERPL W P-5'-P-CCNC: 52 U/L (ref 12–78)
ANION GAP SERPL CALCULATED.3IONS-SCNC: 7 MMOL/L (ref 4–13)
AST SERPL W P-5'-P-CCNC: 29 U/L (ref 5–45)
BASOPHILS # BLD AUTO: 0.03 THOUSANDS/ΜL (ref 0–0.1)
BASOPHILS NFR BLD AUTO: 1 % (ref 0–1)
BILIRUB DIRECT SERPL-MCNC: 0.74 MG/DL (ref 0–0.2)
BILIRUB SERPL-MCNC: 1.9 MG/DL (ref 0.2–1)
BUN SERPL-MCNC: 33 MG/DL (ref 5–25)
CALCIUM SERPL-MCNC: 8.6 MG/DL (ref 8.3–10.1)
CHLORIDE SERPL-SCNC: 101 MMOL/L (ref 100–108)
CO2 SERPL-SCNC: 28 MMOL/L (ref 21–32)
CREAT SERPL-MCNC: 1.48 MG/DL (ref 0.6–1.3)
EOSINOPHIL # BLD AUTO: 0.15 THOUSAND/ΜL (ref 0–0.61)
EOSINOPHIL NFR BLD AUTO: 2 % (ref 0–6)
ERYTHROCYTE [DISTWIDTH] IN BLOOD BY AUTOMATED COUNT: 16.4 % (ref 11.6–15.1)
GFR SERPL CREATININE-BSD FRML MDRD: 48 ML/MIN/1.73SQ M
GGT SERPL-CCNC: 198 U/L (ref 5–85)
GLUCOSE SERPL-MCNC: 114 MG/DL (ref 65–140)
GLUCOSE SERPL-MCNC: 131 MG/DL (ref 65–140)
GLUCOSE SERPL-MCNC: 140 MG/DL (ref 65–140)
GLUCOSE SERPL-MCNC: 153 MG/DL (ref 65–140)
GLUCOSE SERPL-MCNC: 74 MG/DL (ref 65–140)
GLUCOSE SERPL-MCNC: 95 MG/DL (ref 65–140)
HCT VFR BLD AUTO: 28.6 % (ref 36.5–49.3)
HGB BLD-MCNC: 8.5 G/DL (ref 12–17)
IMM GRANULOCYTES # BLD AUTO: 0.05 THOUSAND/UL (ref 0–0.2)
IMM GRANULOCYTES NFR BLD AUTO: 1 % (ref 0–2)
INR PPP: 2.1 (ref 0.84–1.19)
LDH SERPL-CCNC: 478 U/L (ref 81–234)
LYMPHOCYTES # BLD AUTO: 0.8 THOUSANDS/ΜL (ref 0.6–4.47)
LYMPHOCYTES NFR BLD AUTO: 13 % (ref 14–44)
MCH RBC QN AUTO: 27.4 PG (ref 26.8–34.3)
MCHC RBC AUTO-ENTMCNC: 29.7 G/DL (ref 31.4–37.4)
MCV RBC AUTO: 92 FL (ref 82–98)
MONOCYTES # BLD AUTO: 0.29 THOUSAND/ΜL (ref 0.17–1.22)
MONOCYTES NFR BLD AUTO: 5 % (ref 4–12)
NEUTROPHILS # BLD AUTO: 4.98 THOUSANDS/ΜL (ref 1.85–7.62)
NEUTS SEG NFR BLD AUTO: 78 % (ref 43–75)
NRBC BLD AUTO-RTO: 0 /100 WBCS
PLATELET # BLD AUTO: 336 THOUSANDS/UL (ref 149–390)
PMV BLD AUTO: 10 FL (ref 8.9–12.7)
POTASSIUM SERPL-SCNC: 3.5 MMOL/L (ref 3.5–5.3)
PROT SERPL-MCNC: 6.5 G/DL (ref 6.4–8.2)
PROTHROMBIN TIME: 23.2 SECONDS (ref 11.6–14.5)
RBC # BLD AUTO: 3.1 MILLION/UL (ref 3.88–5.62)
SODIUM SERPL-SCNC: 136 MMOL/L (ref 136–145)
VANCOMYCIN TROUGH SERPL-MCNC: 19.7 UG/ML (ref 10–20)
WBC # BLD AUTO: 6.3 THOUSAND/UL (ref 4.31–10.16)

## 2019-10-31 PROCEDURE — 94760 N-INVAS EAR/PLS OXIMETRY 1: CPT

## 2019-10-31 PROCEDURE — 85610 PROTHROMBIN TIME: CPT | Performed by: HOSPITALIST

## 2019-10-31 PROCEDURE — 80076 HEPATIC FUNCTION PANEL: CPT | Performed by: HOSPITALIST

## 2019-10-31 PROCEDURE — 82948 REAGENT STRIP/BLOOD GLUCOSE: CPT

## 2019-10-31 PROCEDURE — 74181 MRI ABDOMEN W/O CONTRAST: CPT

## 2019-10-31 PROCEDURE — 82977 ASSAY OF GGT: CPT | Performed by: INTERNAL MEDICINE

## 2019-10-31 PROCEDURE — 80202 ASSAY OF VANCOMYCIN: CPT | Performed by: NURSE PRACTITIONER

## 2019-10-31 PROCEDURE — 83615 LACTATE (LD) (LDH) ENZYME: CPT | Performed by: INTERNAL MEDICINE

## 2019-10-31 PROCEDURE — 85025 COMPLETE CBC W/AUTO DIFF WBC: CPT | Performed by: HOSPITALIST

## 2019-10-31 PROCEDURE — 99223 1ST HOSP IP/OBS HIGH 75: CPT | Performed by: INTERNAL MEDICINE

## 2019-10-31 PROCEDURE — 94640 AIRWAY INHALATION TREATMENT: CPT

## 2019-10-31 PROCEDURE — 99233 SBSQ HOSP IP/OBS HIGH 50: CPT | Performed by: HOSPITALIST

## 2019-10-31 PROCEDURE — 80048 BASIC METABOLIC PNL TOTAL CA: CPT | Performed by: HOSPITALIST

## 2019-10-31 RX ORDER — SODIUM CHLORIDE 9 MG/ML
100 INJECTION, SOLUTION INTRAVENOUS CONTINUOUS
Status: DISCONTINUED | OUTPATIENT
Start: 2019-11-01 | End: 2019-11-01

## 2019-10-31 RX ADMIN — METOPROLOL TARTRATE 25 MG: 25 TABLET ORAL at 08:50

## 2019-10-31 RX ADMIN — FUROSEMIDE 40 MG: 10 INJECTION, SOLUTION INTRAMUSCULAR; INTRAVENOUS at 05:06

## 2019-10-31 RX ADMIN — CEFEPIME HYDROCHLORIDE 1000 MG: 1 INJECTION, SOLUTION INTRAVENOUS at 03:18

## 2019-10-31 RX ADMIN — INSULIN LISPRO 30 UNITS: 100 INJECTION, SOLUTION INTRAVENOUS; SUBCUTANEOUS at 08:52

## 2019-10-31 RX ADMIN — FLUTICASONE FUROATE AND VILANTEROL TRIFENATATE 1 PUFF: 200; 25 POWDER RESPIRATORY (INHALATION) at 07:41

## 2019-10-31 RX ADMIN — WARFARIN SODIUM 7.5 MG: 7.5 TABLET ORAL at 18:05

## 2019-10-31 RX ADMIN — FUROSEMIDE 40 MG: 10 INJECTION, SOLUTION INTRAMUSCULAR; INTRAVENOUS at 11:57

## 2019-10-31 RX ADMIN — CEFEPIME HYDROCHLORIDE 1000 MG: 1 INJECTION, SOLUTION INTRAVENOUS at 18:42

## 2019-10-31 RX ADMIN — ALLOPURINOL 300 MG: 300 TABLET ORAL at 08:50

## 2019-10-31 RX ADMIN — INSULIN GLARGINE 18 UNITS: 100 INJECTION, SOLUTION SUBCUTANEOUS at 22:04

## 2019-10-31 RX ADMIN — ALBUTEROL SULFATE 2 PUFF: 90 AEROSOL, METERED RESPIRATORY (INHALATION) at 01:08

## 2019-10-31 RX ADMIN — ATORVASTATIN CALCIUM 20 MG: 20 TABLET, FILM COATED ORAL at 08:50

## 2019-10-31 RX ADMIN — FUROSEMIDE 40 MG: 10 INJECTION, SOLUTION INTRAMUSCULAR; INTRAVENOUS at 18:12

## 2019-10-31 RX ADMIN — VANCOMYCIN HYDROCHLORIDE 1500 MG: 1 INJECTION, POWDER, LYOPHILIZED, FOR SOLUTION INTRAVENOUS at 19:28

## 2019-10-31 RX ADMIN — ACYCLOVIR 400 MG: 800 TABLET ORAL at 08:50

## 2019-10-31 RX ADMIN — VANCOMYCIN HYDROCHLORIDE 1500 MG: 1 INJECTION, POWDER, LYOPHILIZED, FOR SOLUTION INTRAVENOUS at 05:49

## 2019-10-31 RX ADMIN — ALBUTEROL SULFATE 2 PUFF: 90 AEROSOL, METERED RESPIRATORY (INHALATION) at 07:41

## 2019-10-31 RX ADMIN — METOPROLOL TARTRATE 25 MG: 25 TABLET ORAL at 22:04

## 2019-10-31 RX ADMIN — INSULIN LISPRO 30 UNITS: 100 INJECTION, SOLUTION INTRAVENOUS; SUBCUTANEOUS at 11:57

## 2019-10-31 NOTE — CONSULTS
Consultation - GI   Chrissy Lowe 81 y o  male MRN: [id number]  Unit/Bed#: [address] [place] [address] [id number] Encounter: [id number]    Consults  ASSESSMENT and PLAN  1  Elevated LFTs  Decreasing  Appears to be a new onset as alk-phos only mildly elevated at 150 in August of this year and other LFTs within normal range  Differential diagnosis include secondary to sepsis or possibly due to the side effect of chemotherapy agent " VELCADE", that was initiated approximately a month ago  Passive congestion a consideration  Right upper quadrant abdominal ultrasound showed gallstone with borderline wall thickening and common bile duct normal in caliber but small do not ducts possible versus adjacent fat  Unlikely calculous cholecystitis or choledocholithiasis in the absence of abdominal pain  - trend LFTs  - scheduled for a MRCP today  - check hepatic, viral and metabolic etiologies for liver disease      Chief Complaint   Patient presents with    Shortness of Breath     pt states being SOB since last night, came to infusion center today, was sent down  pt states he feels like hes in heart failure     Physician Requesting Consult: Melonie Bridges MD    HPI Chrissy Lowe is a 81y o  year old male with multiple medical issues who presents with progressive right leg weakness with the inability to ambulate on his right leg   He has sustained multiple falls over the past several weeks  He recently began on chemotherapy with Velcade for multiple myeloma  Was also having some progressive shortness of breath  GI consult in regards elevated LFTs  New onset  The patient denies ever being told his LFTs were elevated in the past   Denies any IV drug use, hepatitis contacts, or tattoos  Denies jaundice  Believes he has had blood transfusions, but the timing is unclear  Patient denies anorexia or weight loss  Denies any nausea, vomiting or postprandial abdominal pain    Denies any heartburn, dysphagia odynophagia, change in bowel habits, melena rectal bleeding  Historical Information   Past Medical History:   Diagnosis Date    Cancer [facility])     CHF (congestive heart failure) (HCC)     Chronic kidney disease     COPD (chronic obstructive pulmonary disease) (HCC)     Decubitus ulcer of heel     LAST ASSESSED [id number]    Diabetes mellitus (HCC)     History of varicose veins     Hypertension     Intermittent claudication (HCC)     Neuropathy     Seasonal allergies      Past Surgical History:   Procedure Laterality Date    AMPUTATION ABOVE KNEE (AKA) Left 7/31/2019    Procedure: AMPUTATION ABOVE KNEE (AKA);   Surgeon: Swapna Logan MD;  Location: [facility];  Service: General    ANGIOPLASTY      W/ FLUOROSC ANGIOGRAPGY PERIPHERAL ARTERY ADDITIONAL  LAST ASSESSED [id number]    ANGIOPLASTY / STENTING FEMORAL      TANSCATH INTRAVASCULAR STENT PLACEMENT PERCUTANEOUS FEMORAL     CT BONE MARROW BIOPSY AND ASPIRATION  8/9/2019    FULL THICKNESS SKIN GRAFT      TENDON REPAIR      TOE AMPUTATION Left 12/27/2018    Procedure: AMPUTATION left 4th TOE;  Surgeon: Igor Lobato DPM;  [facility]: [facility];  Service: Podiatry     Social History   Social History     Substance and Sexual Activity   Alcohol Use Not Currently     Social History     Substance and Sexual Activity   Drug Use Not Currently     Social History     Tobacco Use   Smoking Status Never Smoker   Smokeless Tobacco Never Used     Family History   Problem Relation Age of Onset    Other Mother         CARDIAC DISORDER     Diabetes Mother     Cancer Father     Other Family         CARDIAC DISORDER     Diabetes Family     Cancer Family     Mental illness Family     Kidney disease Sister        Meds/Allergies   Current Facility-Administered Medications   Medication Dose Route Frequency    acetaminophen (TYLENOL) tablet 650 mg  650 mg Oral Q6H PRN    acyclovir (ZOVIRAX) tablet 400 mg  400 mg Oral Daily    albuterol (PROVENTIL HFA,VENTOLIN HFA) inhaler 2 puff  2 puff Inhalation Q6H PRN    allopurinol (ZYLOPRIM) tablet 300 mg  300 mg Oral Daily    atorvastatin (LIPITOR) tablet 20 mg  20 mg Oral Daily    bisacodyl (DULCOLAX) rectal suppository 10 mg  10 mg Rectal PRN    cefepime (MAXIPIME) 1 g/50 mL dextrose IVPB  1,000 mg Intravenous Q12H    fluticasone-vilanterol (BREO ELLIPTA) 200-25 MCG/INH inhaler 1 puff  1 puff Inhalation Daily    furosemide (LASIX) injection 40 mg  40 mg Intravenous TID (diuretic)    insulin glargine (LANTUS) subcutaneous injection 18 Units 0 18 mL  18 Units Subcutaneous HS    insulin lispro (HumaLOG) 100 units/mL subcutaneous injection 1-6 Units  1-6 Units Subcutaneous HS    insulin lispro (HumaLOG) 100 units/mL subcutaneous injection 2-12 Units  2-12 Units Subcutaneous TID AC    insulin lispro (HumaLOG) 100 units/mL subcutaneous injection 30 Units  30 Units Subcutaneous TID With Meals    metoprolol tartrate (LOPRESSOR) tablet 25 mg  25 mg Oral Q12H    vancomycin (VANCOCIN) 1,500 mg in sodium chloride 0 9 % 250 mL IVPB  1,500 mg Intravenous Q12H    warfarin (COUMADIN) tablet 7 5 mg  7 5 mg Oral Daily (warfarin)     Medications Prior to Admission   Medication    acetaminophen (TYLENOL) 500 mg tablet    acyclovir (ZOVIRAX) 400 MG tablet    albuterol (PROVENTIL HFA,VENTOLIN HFA) 90 mcg/act inhaler    Alcohol Swabs (ALCOHOL PREP) 70 % PADS    allopurinol (ZYLOPRIM) 300 mg tablet    ammonium lactate (LAC-HYDRIN) 12 % lotion    atorvastatin (LIPITOR) 20 mg tablet    BD INSULIN SYRINGE U/F 31G X 5/16" 0 5 ML MISC    BD PEN NEEDLE HILARIO U/F 32G X 4 MM MISC    bisacodyl (bisacodyl) 5 mg EC tablet    bisacodyl (DULCOLAX) 10 mg suppository    bortezomib (VELCADE)    clotrimazole (LOTRIMIN) 1 % cream    fluticasone-salmeterol (ADVAIR DISKUS, WIXELA INHUB) 250-50 mcg/dose inhaler    insulin aspart (NOVOLOG FLEXPEN) 100 Units/mL injection pen    insulin glargine (LANTUS) 100 units/mL subcutaneous injection    Insulin Pen Needle 31G X 5 MM MISC    metFORMIN (GLUCOPHAGE) 1000 MG tablet    metoprolol tartrate (LOPRESSOR) 25 mg tablet    torsemide (DEMADEX) 10 mg tablet    warfarin (COUMADIN) 7 5 mg tablet       Allergies   Allergen Reactions    Latex Rash       PHYSICALEXAM  Blood pressure 145/64, pulse 64, temperature 98 8 °F (37 1 °C), temperature source Oral, resp  rate 20, height 6' 3" (1 905 m), weight (!) 154 kg (340 lb 2 7 oz), SpO2 91 %  Body mass index is 42 52 kg/m²  General Appearance: NAD, cooperative, alert  Eyes: Anicteric, PERRLA, EOMI  ENT:  Normocephalic, atraumatic, normal mucosa  Neck:  Supple, symmetrical, trachea midline  Resp:   diminished breath sounds    CV:  S1 S2, irregular rate and rhythm; no murmur, rub, or gallop  GI:  Soft, non-tender, non-distended; normal bowel sounds; no masses, no organomegaly   Rectal: Deferred  Musculoskeletal: No cyanosis, clubbing or edema  Normal ROM  Skin:  No jaundice, rashes, or lesions   Heme/Lymph: No palpable cervical lymphadenopathy  Psych: Normal affect, good eye contact  Neuro: No gross deficits, AAOx3  EXT - L AKA, erythema on right lower extremity    Lab Results   Component Value Date    GLUCOSE 139 12/22/2015    CALCIUM 8 1 (L) 10/30/2019     01/15/2018    K 4 1 10/30/2019    CO2 28 10/30/2019     10/30/2019    BUN 24 10/30/2019    CREATININE 1 21 10/30/2019     Lab Results   Component Value Date    WBC 7 17 10/30/2019    HGB 8 5 (L) 10/30/2019    HCT 28 4 (L) 10/30/2019    MCV 94 10/30/2019     10/30/2019     Lab Results   Component Value Date    ALT 64 10/30/2019    AST 42 10/30/2019    ALKPHOS 349 (H) 10/30/2019    BILITOT 0 6 01/15/2018     No results found for: AMYLASE  No results found for: LIPASE  Lab Results   Component Value Date    IRON 51 (L) 08/05/2019    TIBC 348 08/05/2019    FERRITIN 134 08/05/2019     Lab Results   Component Value Date    INR 2 23 (H) 10/29/2019       Imaging Studies: I have personally reviewed pertinent reports  EKG, Pathology, and Other Studies: I have personally reviewed pertinent reports  REVIEW OF SYSTEMS:    CONSTITUTIONAL: Denies any fever, chills, rigors, and weight loss  HEENT: No earache or tinnitus  Denies hearing loss or visual disturbances  CARDIOVASCULAR: No chest pain or palpitations  RESPIRATORY: Denies any cough, hemoptysis, positive for shortness of breath and dyspnea on exertion  GASTROINTESTINAL: As noted in the History of Present Illness  GENITOURINARY: No problems with urination  Denies any hematuria or dysuria  NEUROLOGIC: No dizziness or vertigo, denies headaches  MUSCULOSKELETAL admits to muscle aches, pains ambulatory dysfunction  SKIN:  Right lower extremity erythema  ENDOCRINE: Denies excessive thirst  Denies intolerance to heat or cold  PSYCHOSOCIAL: Denies depression or anxiety  Denies any recent memory loss

## 2019-10-31 NOTE — TRANSPORTATION MEDICAL NECESSITY
Section I - General Information    Name of Patient: Chirssy Lowe                 : 1950    Medicare #: [id number]  Transport Date: 10/31/19 (PCS is valid for round trips on this date and for all repetitive trips in the 60-day range as noted below )  Origin: [place]: [facility]  Is the pt's stay covered under Medicare Part A (PPS/DRG)   []     Closest appropriate facility? If no, why is transport to more distant facility required? Yes  If hospice pt, is this transport related to pt's terminal illness? NA       Section II - Medical Necessity Questionnaire  Ambulance transportation is medically necessary only if other means of transport are contraindicated or would be potentially harmful to the patient  To meet this requirement, the patient must either be "bed confined" or suffer from a condition such that transport by means other than ambulance is contraindicated by the patient's condition  The following questions must be answered by the medical professional signing below for this form to be valid:    1)  Describe the MEDICAL CONDITION (physical and/or mental) of this patient AT [address] [place] [address] that requires the patient to be transported in an ambulance and why transport by other means is contraindicated by the patient's condition: Going to [facility] for MRI and MRCP, has L AKA, needs max assistance x 2 for transfer    2) Is the patient "bed confined" as defined below? No  To be "be confined" the patient must satisfy all three of the following conditions: (1) unable to get up from bed without Assistance; AND (2) unable to ambulate; AND (3) unable to sit in a chair or wheelchair  3) Can this patient safely be transported by car or wheelchair van (i e , seated during transport without a medical attendant or monitoring)?   No    4) In addition to completing questions 1-3 above, please check any of the following conditions that apply*:   *Note: supporting documentation for any boxes checked must be maintained in the patient's medical records  If hosp-hosp transfer, describe services needed at 2nd facility not available at 1st facility? Medical attendant required       Section III - Signature of Physician or Healthcare Professional  I certify that the above information is true and correct based on my evaluation of this patient, and represent that the patient requires transport by ambulance and that other forms of transport are contraindicated  I understand that this information will be used by the Centers for Medicare and Medicaid Services (CMS) to support the determination of medical necessity for ambulance services, and I represent that I have personal knowledge of the patient's condition at time of transport  []  If this box is checked, I also certify that the patient is physically or mentally incapable of signing the ambulance service's claim and that the institution with which I am affiliated has furnished care, services, or assistance to the patient  My signature below is made on behalf of the patient pursuant to 42 CFR §424 36(b)(4)  In accordance with 42 CFR §424 37, the specific reason(s) that the patient is physically or mentally incapable of signing the claim form is as follows:       Signature of Physician* or Healthcare Professional______________________________________________________________  Signature Date 10/31/19 (For scheduled repetitive transports, this form is not valid for transports performed more than 60 days after this date)    Printed Name & Credentials of Physician or Healthcare Professional (MD, DO, RN, etc )________________________________  *Form must be signed by patient's attending physician for scheduled, repetitive transports   For non-repetitive, unscheduled ambulance transports, if unable to obtain the signature of the attending physician, any of the following may sign (choose appropriate option below)  [] Physician Assistant []  Clinical Nurse Specialist []  Registered Nurse  []  Nurse Practitioner  [x] Discharge Planner

## 2019-10-31 NOTE — ASSESSMENT & PLAN NOTE
· Recently diagnosed 09/2019  · Patient was scheduled for Velcade on day of admission however due to weakness presented to the emergency room  · Outpatient Oncology follow-up

## 2019-10-31 NOTE — SOCIAL WORK
Continue to follow  Spoke with Pt's son(Gerald: [phone]) re: discharge planning   informed Pt's son of recommendation for SNF  Pt's son in agreement and is agreeable for [facility] for Pt  Will follow and update [facility] as Pt progresses  Will need to obtain insurance auth for SNF prior to discharge  Will follow

## 2019-10-31 NOTE — ASSESSMENT & PLAN NOTE
· Patient presented with worsening right lower extremity swelling with small blister on dorsal part of his right foot  · Continue cefepime/vancomycin given prior wound cultures positive for MRSA and Proteus  · X-ray R foot: No radiographic evidence of osteomyelitis or soft tissue air    · Procalcitonin 0 17  · Blood cultures NGTD

## 2019-10-31 NOTE — PROGRESS NOTES
Progress Note - Orlando Tinsley 1950, 81 y o  male MRN: [id number]    Unit/Bed#: [address] [place] [address] Encounter: [id number]    Primary Care Provider: Adelina Houston MD   Date and time admitted to hospital: 10/29/2019  1:43 PM        Elevated troponin  Assessment & Plan  · EKG with AFib no acute ST changes  · troponins indeterminate (non-MI trop elevation)     Transaminitis  Assessment & Plan  -DDx includes R-sided CHF, SE of Velcade, cholesystitis, Choledocholithiasis  -MRCP ordered  -GI consulted  -RUQ U/S: Evidence of gallstone with borderline wall thickening  The common duct is normal in caliber but small stone within the duct is possible versus adjacent fat  Direct biliary imaging would be useful for further assessment especially given transaminitis      Weakness  Assessment & Plan  · PT/OT     Multiple myeloma not having achieved remission [facility])  Assessment & Plan  · Recently diagnosed 09/2019  · Patient was scheduled for Velcade on day of admission however due to weakness presented to the emergency room  · Outpatient Oncology follow-up    Moderate persistent asthma without complication  Assessment & Plan  · Without acute exacerbation  · Continue home Advair    SOB (shortness of breath)  Assessment & Plan  · Patient stated CROCKER was worsening over the few weeks PTA  · CTA negative for PE  · Positive lower extremity edema  · Saturations 97% on room air  · Likely due to morbid obesity/NALLELY/OHS/deconditioning    Hyperlipidemia  Assessment & Plan  · Continue home statin    Acute on chronic diastolic congestive heart failure [facility])  Assessment & Plan  Wt Readings from Last 3 Encounters:   10/31/19 (!) 154 kg (340 lb 2 7 oz)   10/20/19 (!) 159 kg (350 lb 8 5 oz)   10/15/19 (!) 159 kg (351 lb 6 6 oz)       · Prior echo with an EF of 34% grade 3 diastolic dysfunction  · Cont IV lasix, monitor Cr  · CTA negative        Morbid obesity (HCC)  Assessment & Plan  · Dietary education    Chronic atrial fibrillation  Assessment & Plan  · Continue home beta-blocker/Coumadin  · Follow INR    Diabetes mellitus type 2, uncontrolled ([facility] [address] )  Assessment & Plan  Lab Results   Component Value Date    HGBA1C 9 4 (H) 2018       Recent Labs     10/30/19  1105 10/30/19  1610 10/30/19  2050 10/31/19  0623   POCGLU 193* 112 96 114       Blood Sugar Average: Last 72 hrs:  · (P) 116 5 continue Lantus/sliding-scale insulin  · Hold home metformin    * Cellulitis  Assessment & Plan  · Patient presented with worsening right lower extremity swelling with small blister on dorsal part of his right foot  · Continue cefepime/vancomycin given prior wound cultures positive for MRSA and Proteus  · X-ray R foot: No radiographic evidence of osteomyelitis or soft tissue air  · Procalcitonin 0 17  · Blood cultures NGTD      VTE Pharmacologic Prophylaxis:   Pharmacologic: Warfarin (Coumadin)  Mechanical VTE Prophylaxis in Place: Yes    Patient Centered Rounds: I have performed bedside rounds with nursing staff today  Discussions with Specialists or Other Care Team Provider: ID, GI    Education and Discussions with Family / Patient: Pt    Time Spent for Care: 20 minutes  More than 50% of total time spent on counseling and coordination of care as described above  Current Length of Stay: 2 day(s)    Current Patient Status: Inpatient   Certification Statement: The patient will continue to require additional inpatient hospital stay due to cellulitis    Discharge Plan: 4+ days    Code Status: Level 1 - Full Code      Subjective:   No new complaints  Objective:     Vitals:   Temp (24hrs), Av 7 °F (37 1 °C), Min:98 2 °F (36 8 °C), Max:99 °F (37 2 °C)    Temp:  [98 2 °F (36 8 °C)-99 °F (37 2 °C)] 98 8 °F (37 1 °C)  HR:  [64-88] 64  Resp:  [20] 20  BP: (138-157)/(64-80) 145/64  SpO2:  [91 %-95 %] 91 %  Body mass index is 42 52 kg/m²  Input and Output Summary (last 24 hours):        Intake/Output Summary (Last 24 hours) at 10/31/2019 [address] filed at 10/31/2019 0801  Gross per 24 hour   Intake 660 ml   Output 2000 ml   Net -1340 ml       Physical Exam:     Physical Exam   Constitutional: He is oriented to person, place, and time  He appears well-developed and well-nourished  HENT:   Head: Normocephalic and atraumatic  Eyes: Pupils are equal, round, and reactive to light  EOM are normal    Neck: Normal range of motion  Neck supple  Cardiovascular: Normal rate and normal heart sounds  No murmur heard  Irregularly irregular rhythm   Pulmonary/Chest: Effort normal and breath sounds normal  No respiratory distress  Abdominal: Soft  Bowel sounds are normal  He exhibits no distension  Musculoskeletal: He exhibits edema (3+ LE edema)  Neurological: He is alert and oriented to person, place, and time  No cranial nerve deficit  Moves all extremities   Skin: Skin is warm and dry  Cellulitis right lower extremity   Psychiatric: He has a normal mood and affect  Vitals reviewed          Additional Data:     Labs:    Results from last 7 days   Lab Units 10/30/19  0521   WBC Thousand/uL 7 17   HEMOGLOBIN g/dL 8 5*   HEMATOCRIT % 28 4*   PLATELETS Thousands/uL 327   NEUTROS PCT % 74   LYMPHS PCT % 11*   MONOS PCT % 8   EOS PCT % 4     Results from last 7 days   Lab Units 10/30/19  0521   SODIUM mmol/L 142   POTASSIUM mmol/L 4 1   CHLORIDE mmol/L 105   CO2 mmol/L 28   BUN mg/dL 24   CREATININE mg/dL 1 21   ANION GAP mmol/L 9   CALCIUM mg/dL 8 1*   ALBUMIN g/dL 2 4*   TOTAL BILIRUBIN mg/dL 1 50*   ALK PHOS U/L 349*   ALT U/L 64   AST U/L 42   GLUCOSE RANDOM mg/dL 80     Results from last 7 days   Lab Units 10/29/19  2135   INR  2 23*     Results from last 7 days   Lab Units 10/31/19  0623 10/30/19  2050 10/30/19  1610 10/30/19  1105 10/30/19  0708 10/29/19  2112   POC GLUCOSE mg/dl 114 96 112 193* 103 81         Results from last 7 days   Lab Units 10/29/19  2135 10/29/19  1424   LACTIC ACID mmol/L  --  1 8   PROCALCITONIN ng/ml 0 17 --            * I Have Reviewed All Lab Data Listed Above  * Additional Pertinent Lab Tests Reviewed: [address] Admission Reviewed    Recent Cultures (last 7 days):     Results from last 7 days   Lab Units 10/29/19  1424   BLOOD CULTURE  No Growth at 24 hrs  No Growth at 24 hrs  Last 24 Hours Medication List:     Current Facility-Administered Medications:  acetaminophen 650 mg Oral Q6H PRN NICOLA Aguilar    acyclovir 400 mg Oral Daily NICOLA Aguilar    albuterol 2 puff Inhalation Q6H PRN NICOLA Aguilar    allopurinol 300 mg Oral Daily NICOLA Pfeiffer    atorvastatin 20 mg Oral Daily NICOLA Pfeiffer    bisacodyl 10 mg Rectal PRN NICOLA Aguilar    cefepime 1,000 mg Intravenous Q12H NICOLA Pfeiffer Last Rate: 1,000 mg (10/31/19 0318)   fluticasone-vilanterol 1 puff Inhalation Daily NICOLA Pfeiffer    furosemide 40 mg Intravenous TID (diuretic) NICOLA Aguilar    insulin glargine 18 Units Subcutaneous HS NICOLA Pfeiffer    insulin lispro 1-6 Units Subcutaneous HS NICOLA Pfeiffer    insulin lispro 2-12 Units Subcutaneous TID AC NICOLA Pfeiffer    insulin lispro 30 Units Subcutaneous TID With Meals NICOLA Aguilar    metoprolol tartrate 25 mg Oral Q12H NICOLA Pfeiffer    vancomycin 1,500 mg Intravenous Q12H NICOLA Pfeiffer Last Rate: 1,500 mg (10/31/19 0549)   warfarin 7 5 mg Oral Daily (warfarin) NICOLA Aguilar         Today, Patient Was Seen By: Campos Heller MD    ** Please Note: Dictation voice to text software may have been used in the creation of this document   **

## 2019-10-31 NOTE — ASSESSMENT & PLAN NOTE
Lab Results   Component Value Date    HGBA1C 9 4 (H) 12/27/2018       Recent Labs     10/30/19  1105 10/30/19  1610 10/30/19  2050 10/31/19  0623   POCGLU 193* 112 96 114       Blood Sugar Average: Last 72 hrs:  · (P) 116 5 continue Lantus/sliding-scale insulin  · Hold home metformin

## 2019-10-31 NOTE — ASSESSMENT & PLAN NOTE
-DDx includes R-sided CHF, SE of Velcade, cholesystitis, Choledocholithiasis  -MRCP ordered  -GI consulted  -RUQ U/S: Evidence of gallstone with borderline wall thickening  The common duct is normal in caliber but small stone within the duct is possible versus adjacent fat  Direct biliary imaging would be useful for further assessment especially given transaminitis

## 2019-10-31 NOTE — ASSESSMENT & PLAN NOTE
Wt Readings from Last 3 Encounters:   10/31/19 (!) 154 kg (340 lb 2 7 oz)   10/20/19 (!) 159 kg (350 lb 8 5 oz)   10/15/19 (!) 159 kg (351 lb 6 6 oz)       · Prior echo with an EF of 67% grade 3 diastolic dysfunction  · Cont IV lasix, monitor Cr  · CTA negative

## 2019-10-31 NOTE — CONSULTS
Consultation - Infectious Disease   Emely Garcia 81 y o  male MRN: [id number]  Unit/Bed#: [address] [place] [address] [id number] Encounter: [id number]      Assessment/Plan   1  Fever, cellulitis LLE:, recurrent from chronic edema, doubt deeper infection  - cont with cefepime and vancomycin  - await blood cultures  - follow cbc and fever curve  - leg elevation as tolerated     History of Present Illness   Physician Requesting Consult: Tr Jean Baptiste MD  Reason for Consult / Principal Problem: cellulitis    HPI: Emely Garcia is a 81y o  year old male with H/O morbid obesity, multiple myeloma on velcade, afib, L aka, recurrent cellulitis RLE  Patient sent over for weakness and had a low grade fever in the ER  Patient reports increased pain, redness in his RLE,  Patient started on vanco and cefepime as last cultures were + proteus as well as a MRSA  Patient has been afebrile overnight  He has had a mild transaminitis  GI evaluating going for ERCP  He denies headaches, chest pains abd pains, n/v/d  Consults    ROS: 12 systems reviewed, remainder is neg  Historical Information   Past Medical History:   Diagnosis Date    Cancer [facility])     CHF (congestive heart failure) (HCC)     Chronic kidney disease     [facility]ch[facility] obstructive pulmonary disease) ([facility])     Decubitus ulcer of heel     LAST ASSESSED [id number]    Diabetes mellitus (HCC)     History of varicose veins     Hypertension     Intermittent claudication (HCC)     Neuropathy     Seasonal allergies      Past Surgical History:   Procedure Laterality Date    AMPUTATION ABOVE KNEE (AKA) Left 7/31/2019    Procedure: AMPUTATION ABOVE KNEE (AKA);   Surgeon: Francisco Jimenez MD;  Location:  MAIN OR;  Service: General    ANGIOPLASTY      W/ FLUOROSC ANGIOGRAPGY PERIPHERAL ARTERY ADDITIONAL  LAST ASSESSED [id number]    ANGIOPLASTY / STENTING FEMORAL      TANSCATH INTRAVASCULAR STENT PLACEMENT PERCUTANEOUS FEMORAL     CT BONE MARROW BIOPSY AND ASPIRATION  8/9/2019    FULL THICKNESS SKIN GRAFT      TENDON REPAIR      TOE AMPUTATION Left 12/27/2018    Procedure: AMPUTATION left 4th TOE;  Surgeon: Rodolfo Orozco DPM;  Location: [facility];  Service: Podiatry     Social History   Social History     Substance and Sexual Activity   Alcohol Use Not Currently     Social History     Substance and Sexual Activity   Drug Use Not Currently     Social History     Tobacco Use   Smoking Status Never Smoker   Smokeless Tobacco Never Used     Family History   Problem Relation Age of Onset    Other Mother         CARDIAC DISORDER     Diabetes Mother     Cancer Father     Other Family         CARDIAC DISORDER     Diabetes Family     Cancer Family     Mental illness Family     Kidney disease Sister        Meds/Allergies   MEDS:reviewed      Current Facility-Administered Medications:     acetaminophen (TYLENOL) tablet 650 mg, 650 mg, Oral, Q6H PRN, Goyal Lalitoa, CRNP, 650 mg at 10/29/19 2236    acyclovir (ZOVIRAX) tablet 400 mg, 400 mg, Oral, Daily, Domitila Dalton, CRNP, 400 mg at 10/31/19 0850    albuterol (PROVENTIL HFA,VENTOLIN HFA) inhaler 2 puff, 2 puff, Inhalation, Q6H PRN, Jay Jay Starkeya, CRNP, 2 puff at 10/31/19 0741    allopurinol (ZYLOPRIM) tablet 300 mg, 300 mg, Oral, Daily, Domitila Dalton CRNP, 300 mg at 10/31/19 0850    atorvastatin (LIPITOR) tablet 20 mg, 20 mg, Oral, Daily, Domitila Stezenkamryn CRNP, 20 mg at 10/31/19 0850    bisacodyl (DULCOLAX) rectal suppository 10 mg, 10 mg, Rectal, PRN, Jay Jay Guardado CRNP    cefepime (MAXIPIME) 1 g/50 mL dextrose IVPB, 1,000 mg, Intravenous, Q12H, NICOLA Pfeiffer, Last Rate: 100 mL/hr at 10/31/19 0318, 1,000 mg at 10/31/19 0318    fluticasone-vilanterol (BREO ELLIPTA) 200-25 MCG/INH inhaler 1 puff, 1 puff, Inhalation, Daily, Domitila Irvingzenkamryn CRNP, 1 puff at 10/31/19 0741    furosemide (LASIX) injection 40 mg, 40 mg, Intravenous, TID (diuretic), Katherine Wade NICOLA Dalton, 40 mg at 10/31/19 0506    insulin glargine (LANTUS) subcutaneous injection 18 Units 0 18 mL, 18 Units, Subcutaneous, HS, NICOLA Pfeiffer, 18 Units at 10/29/19 2220    insulin lispro (HumaLOG) 100 units/mL subcutaneous injection 1-6 Units, 1-6 Units, Subcutaneous, HS, NICOLA Pfeiffer    insulin lispro (HumaLOG) 100 units/mL subcutaneous injection 2-12 Units, 2-12 Units, Subcutaneous, TID AC, 2 Units at 10/30/19 1213 **AND** Fingerstick Glucose (POCT), , , TID AC, NICOLA Pfeiffer    insulin lispro (HumaLOG) 100 units/mL subcutaneous injection 30 Units, 30 Units, Subcutaneous, TID With Meals, KtNICOLA Heredia, 30 Units at 10/31/19 6794    metoprolol tartrate (LOPRESSOR) tablet 25 mg, 25 mg, Oral, Q12H, NICOLA Pfeiffer, 25 mg at 10/31/19 0850    vancomycin (VANCOCIN) 1,500 mg in sodium chloride 0 9 % 250 mL IVPB, 1,500 mg, Intravenous, Q12H, NICOLA Pfeiffer, Last Rate: 166 7 mL/hr at 10/31/19 0549, 1,500 mg at 10/31/19 0549    warfarin (COUMADIN) tablet 7 5 mg, 7 5 mg, Oral, Daily (warfarin), Kt Ruben, AZULNP, 7 5 mg at 10/30/19 1700    Allergies   Allergen Reactions    Latex Rash         Intake/Output Summary (Last 24 hours) at 10/31/2019 0947  Last data filed at 10/31/2019 0801  Gross per 24 hour   Intake 660 ml   Output 2000 ml   Net -1340 ml       PE:  Gen: morbidly obses  VSS,  HEENT: anicteric  NECK: supple  CARDIAC: rrr s1s2  LUNGS: cta bilaterally  ABDOMEN: soft nt/nd + BS  EXTREMITIES: L aka, R leg erythematous with blistering, min  pain  SKIN:  No rashes  NEURO:  Grossly non-focal    Invasive Devices:   Peripheral IV 10/29/19 Right Antecubital (Active)   Site Assessment Clean; Intact 10/31/2019 12:54 AM   Dressing Type Transparent 10/31/2019 12:54 AM   Line Status Capped 10/31/2019 12:54 AM   Dressing Status Clean; Intact 10/31/2019 12:54 AM           Lab Results:   Admission on 10/29/2019   Component Date Value    Sodium 10/29/2019 139     Potassium 10/29/2019 4 6     Chloride 10/29/2019 104     CO2 10/29/2019 30     ANION GAP 10/29/2019 5     BUN 10/29/2019 29*    Creatinine 10/29/2019 1 37*    Glucose 10/29/2019 182*    Calcium 10/29/2019 8 3     AST 10/29/2019 73*    ALT 10/29/2019 81*    Alkaline Phosphatase 10/29/2019 394*    Total Protein 10/29/2019 6 6     Albumin 10/29/2019 2 4*    Total Bilirubin 10/29/2019 1 50*    eGFR 10/29/2019 53     WBC 10/29/2019 7 31     RBC 10/29/2019 3 04*    Hemoglobin 10/29/2019 8 5*    Hematocrit 10/29/2019 28 7*    MCV 10/29/2019 94     MCH 10/29/2019 28 0     MCHC 10/29/2019 29 6*    RDW 10/29/2019 16 7*    MPV 10/29/2019 10 0     Platelets 58/84/0041 340     nRBC 10/29/2019 1     Neutrophils Relative 10/29/2019 77*    Immat GRANS % 10/29/2019 2     Lymphocytes Relative 10/29/2019 11*    Monocytes Relative 10/29/2019 7     Eosinophils Relative 10/29/2019 3     Basophils Relative 10/29/2019 0     Neutrophils Absolute 10/29/2019 5 69     Immature Grans Absolute 10/29/2019 0 11     Lymphocytes Absolute 10/29/2019 0 81     Monocytes Absolute 10/29/2019 0 49     Eosinophils Absolute 10/29/2019 0 18     Basophils Absolute 10/29/2019 0 03     Troponin I 10/29/2019 0 15*    Color, UA 10/29/2019 Yellow     Clarity, UA 10/29/2019 Clear     Specific Gravity, UA 10/29/2019 1 015     pH, UA 10/29/2019 5 0     Leukocytes, UA 10/29/2019 Negative     Nitrite, UA 10/29/2019 Negative     Protein, UA 10/29/2019 Negative     Glucose, UA 10/29/2019 Negative     Ketones, UA 10/29/2019 Negative     Urobilinogen, UA 10/29/2019 2 0*    Bilirubin, UA 10/29/2019 Negative     Blood, UA 10/29/2019 Negative     Blood Culture 10/29/2019 No Growth at 24 hrs   Blood Culture 10/29/2019 No Growth at 24 hrs       NT-proBNP 10/29/2019 2,827*    LACTIC ACID 10/29/2019 1 8     Protime 10/29/2019 24 4*    INR 10/29/2019 2 23*    Procalcitonin 10/29/2019 0 17     Troponin I 10/29/2019 0 16*    Troponin I 10/30/2019 0 16*    POC Glucose 10/29/2019 81     Sodium 10/30/2019 142     Potassium 10/30/2019 4 1     Chloride 10/30/2019 105     CO2 10/30/2019 28     ANION GAP 10/30/2019 9     BUN 10/30/2019 24     Creatinine 10/30/2019 1 21     Glucose 10/30/2019 80     Calcium 10/30/2019 8 1*    AST 10/30/2019 42     ALT 10/30/2019 64     Alkaline Phosphatase 10/30/2019 349*    Total Protein 10/30/2019 6 4     Albumin 10/30/2019 2 4*    Total Bilirubin 10/30/2019 1 50*    eGFR 10/30/2019 62     WBC 10/30/2019 7 17     RBC 10/30/2019 3 01*    Hemoglobin 10/30/2019 8 5*    Hematocrit 10/30/2019 28 4*    MCV 10/30/2019 94     MCH 10/30/2019 28 2     MCHC 10/30/2019 29 9*    RDW 10/30/2019 16 3*    MPV 10/30/2019 10 1     Platelets 97/86/2238 327     nRBC 10/30/2019 0     Neutrophils Relative 10/30/2019 74     Immat GRANS % 10/30/2019 2     Lymphocytes Relative 10/30/2019 11*    Monocytes Relative 10/30/2019 8     Eosinophils Relative 10/30/2019 4     Basophils Relative 10/30/2019 1     Neutrophils Absolute 10/30/2019 5 37     Immature Grans Absolute 10/30/2019 0 12     Lymphocytes Absolute 10/30/2019 0 80     Monocytes Absolute 10/30/2019 0 58     Eosinophils Absolute 10/30/2019 0 25     Basophils Absolute 10/30/2019 0 05     POC Glucose 10/30/2019 103     Ventricular Rate 10/29/2019 79     Atrial Rate 10/29/2019 66     QRSD Interval 10/29/2019 110     QT Interval 10/29/2019 400     QTC Interval 10/29/2019 458     QRS Axis 10/29/2019 63     T Wave Axis 10/29/2019 35     POC Glucose 10/30/2019 193*    POC Glucose 10/30/2019 112     POC Glucose 10/30/2019 96     Vancomycin Tr 10/31/2019 19 7     POC Glucose 10/31/2019 114      Imaging Studies: I have personally reviewed pertinent reports  EKG, Pathology, and Other Studies: I have personally reviewed pertinent reports        Culture  Lab Results   Component Value Date    BLOODCX No Growth at 24 hrs  10/29/2019    BLOODCX No Growth at 24 hrs  10/29/2019    BLOODCX No Growth After 5 Days  07/25/2019    BLOODCX No Growth After 5 Days  07/25/2019    BLOODCX No Growth After 5 Days  12/25/2018    BLOODCX No Growth After 5 Days  12/25/2018    BLOODCX No Growth After 5 Days  03/23/2018    BLOODCX No Growth After 5 Days  03/23/2018    BLOODCX No Growth After 5 Days  10/08/2017    BLOODCX No Growth After 5 Days  10/08/2017    BLOODCX No Growth After 5 Days  09/15/2016    BLOODCX No Growth After 5 Days   09/15/2016     Lab Results   Component Value Date    WOUNDCULT 4+ Growth of Proteus mirabilis (A) 07/25/2019    WOUNDCULT 4+ Growth of  07/25/2019    WOUNDCULT (A) 07/25/2019     4+ Growth of Methicillin Resistant Staphylococcus aureus    WOUNDCULT 1+ Growth of Proteus mirabilis (A) 07/25/2019    WOUNDCULT 4+ Growth of  07/25/2019    WOUNDCULT 3+ Growth of Staphylococcus aureus (A) 12/25/2018    WOUNDCULT 2+ Growth of  12/25/2018    WOUNDCULT 4+ Growth of Staphylococcus aureus 09/15/2016    WOUNDCULT 4+ Growth of Proteus mirabilis 09/15/2016    WOUNDCULT 4+ Growth of Mixed Skin Nini 09/15/2016     No results found for: URINECX  No results found for: SPUTUMCULTUR    Principal Problem:    Cellulitis  Active Problems:    Diabetes mellitus type 2, uncontrolled ([facility] )    Chronic atrial fibrillation    Morbid obesity ([facility] )    Acute on chronic diastolic congestive heart failure (HCC)    Hyperlipidemia    SOB (shortness of breath)    Moderate persistent asthma without complication    Multiple myeloma not having achieved remission (HCC)    Weakness    Transaminitis    Elevated troponin

## 2019-10-31 NOTE — TRANSPORTATION MEDICAL NECESSITY
Section I - General Information    Name of Patient: Heather Aguayo                 : 1950    Medicare #: [id number]  Transport Date: 10/31/19 (PCS is valid for round trips on this date and for all repetitive trips in the 60-day range as noted below )  [facility]  Is the pt's stay covered under Medicare Part A (PPS/DRG)   []     Closest appropriate facility? If no, why is transport to more distant facility required? Yes  If hospice pt, is this transport related to pt's terminal illness? NA       Section II - Medical Necessity Questionnaire  Ambulance transportation is medically necessary only if other means of transport are contraindicated or would be potentially harmful to the patient  To meet this requirement, the patient must either be "bed confined" or suffer from a condition such that transport by means other than ambulance is contraindicated by the patient's condition  The following questions must be answered by the medical professional signing below for this form to be valid:    1)  Describe the MEDICAL CONDITION (physical and/or mental) of this patient AT [address] [place] [address] that requires the patient to be transported in an ambulance and why transport by other means is contraindicated by the patient's condition: Going to [facility] for MRI and MRCP, has L AKA, needs max assistance x 2 for transfer    2) Is the patient "bed confined" as defined below? No  To be "be confined" the patient must satisfy all three of the following conditions: (1) unable to get up from bed without Assistance; AND (2) unable to ambulate; AND (3) unable to sit in a chair or wheelchair  3) Can this patient safely be transported by car or wheelchair van (i e , seated during transport without a medical attendant or monitoring)?   No    4) In addition to completing questions 1-3 above, please check any of the following conditions that apply*:   *Note: supporting documentation for any boxes checked must be maintained in the patient's medical records  If hosp-hosp transfer, describe services needed at 2nd facility not available at 1st facility? Medical attendant required       Section III - Signature of Physician or Healthcare Professional  I certify that the above information is true and correct based on my evaluation of this patient, and represent that the patient requires transport by ambulance and that other forms of transport are contraindicated  I understand that this information will be used by the Centers for Medicare and Medicaid Services (CMS) to support the determination of medical necessity for ambulance services, and I represent that I have personal knowledge of the patient's condition at time of transport  []  If this box is checked, I also certify that the patient is physically or mentally incapable of signing the ambulance service's claim and that the institution with which I am affiliated has furnished care, services, or assistance to the patient  My signature below is made on behalf of the patient pursuant to 42 CFR §424 36(b)(4)  In accordance with 42 CFR §424 37, the specific reason(s) that the patient is physically or mentally incapable of signing the claim form is as follows:       Signature of Physician* or Healthcare Professional______________________________________________________________  Signature Date 10/31/19 (For scheduled repetitive transports, this form is not valid for transports performed more than 60 days after this date)    Printed Name & Credentials of Physician or Healthcare Professional (MD, DO, RN, etc )________________________________  *Form must be signed by patient's attending physician for scheduled, repetitive transports   For non-repetitive, unscheduled ambulance transports, if unable to obtain the signature of the attending physician, any of the following may sign (choose appropriate option below)  [] Physician Assistant []  Clinical Nurse Specialist []  Registered Nurse  []  Nurse Practitioner  [x] Discharge Planner

## 2019-10-31 NOTE — ASSESSMENT & PLAN NOTE
· Patient stated Juline Backbone was worsening over the few weeks PTA  · CTA negative for PE  · Positive lower extremity edema  · Saturations 97% on room air  · Likely due to morbid obesity/NALLELY/OHS/deconditioning

## 2019-11-01 PROBLEM — N17.9 AKI (ACUTE KIDNEY INJURY) (HCC): Status: ACTIVE | Noted: 2019-11-01

## 2019-11-01 PROBLEM — M62.89 MASS OF PSOAS MUSCLE: Status: ACTIVE | Noted: 2019-11-01

## 2019-11-01 LAB
ALBUMIN SERPL BCP-MCNC: 2.3 G/DL (ref 3.5–5)
ALP SERPL-CCNC: 349 U/L (ref 46–116)
ALT SERPL W P-5'-P-CCNC: 39 U/L (ref 12–78)
ANION GAP SERPL CALCULATED.3IONS-SCNC: 9 MMOL/L (ref 4–13)
AST SERPL W P-5'-P-CCNC: 24 U/L (ref 5–45)
BASOPHILS # BLD AUTO: 0.04 THOUSANDS/ΜL (ref 0–0.1)
BASOPHILS NFR BLD AUTO: 1 % (ref 0–1)
BILIRUB SERPL-MCNC: 1.3 MG/DL (ref 0.2–1)
BUN SERPL-MCNC: 36 MG/DL (ref 5–25)
CALCIUM SERPL-MCNC: 8.4 MG/DL (ref 8.3–10.1)
CHLORIDE SERPL-SCNC: 103 MMOL/L (ref 100–108)
CO2 SERPL-SCNC: 27 MMOL/L (ref 21–32)
CREAT SERPL-MCNC: 1.38 MG/DL (ref 0.6–1.3)
EOSINOPHIL # BLD AUTO: 0.25 THOUSAND/ΜL (ref 0–0.61)
EOSINOPHIL NFR BLD AUTO: 4 % (ref 0–6)
ERYTHROCYTE [DISTWIDTH] IN BLOOD BY AUTOMATED COUNT: 16.3 % (ref 11.6–15.1)
FERRITIN SERPL-MCNC: 824 NG/ML (ref 8–388)
GFR SERPL CREATININE-BSD FRML MDRD: 53 ML/MIN/1.73SQ M
GLUCOSE SERPL-MCNC: 118 MG/DL (ref 65–140)
GLUCOSE SERPL-MCNC: 129 MG/DL (ref 65–140)
GLUCOSE SERPL-MCNC: 130 MG/DL (ref 65–140)
GLUCOSE SERPL-MCNC: 135 MG/DL (ref 65–140)
GLUCOSE SERPL-MCNC: 56 MG/DL (ref 65–140)
GLUCOSE SERPL-MCNC: 64 MG/DL (ref 65–140)
HBV SURFACE AG SER QL: NORMAL
HCT VFR BLD AUTO: 27.9 % (ref 36.5–49.3)
HCV AB SER QL: NORMAL
HGB BLD-MCNC: 8.4 G/DL (ref 12–17)
IMM GRANULOCYTES # BLD AUTO: 0.06 THOUSAND/UL (ref 0–0.2)
IMM GRANULOCYTES NFR BLD AUTO: 1 % (ref 0–2)
INR PPP: 2.11 (ref 0.84–1.19)
IRON SATN MFR SERPL: 19 %
IRON SERPL-MCNC: 49 UG/DL (ref 65–175)
LYMPHOCYTES # BLD AUTO: 0.81 THOUSANDS/ΜL (ref 0.6–4.47)
LYMPHOCYTES NFR BLD AUTO: 13 % (ref 14–44)
MCH RBC QN AUTO: 27.9 PG (ref 26.8–34.3)
MCHC RBC AUTO-ENTMCNC: 30.1 G/DL (ref 31.4–37.4)
MCV RBC AUTO: 93 FL (ref 82–98)
MONOCYTES # BLD AUTO: 0.48 THOUSAND/ΜL (ref 0.17–1.22)
MONOCYTES NFR BLD AUTO: 8 % (ref 4–12)
NEUTROPHILS # BLD AUTO: 4.47 THOUSANDS/ΜL (ref 1.85–7.62)
NEUTS SEG NFR BLD AUTO: 73 % (ref 43–75)
NRBC BLD AUTO-RTO: 0 /100 WBCS
PLATELET # BLD AUTO: 335 THOUSANDS/UL (ref 149–390)
PMV BLD AUTO: 9.7 FL (ref 8.9–12.7)
POTASSIUM SERPL-SCNC: 3.6 MMOL/L (ref 3.5–5.3)
PROT SERPL-MCNC: 6.4 G/DL (ref 6.4–8.2)
PROTHROMBIN TIME: 23.3 SECONDS (ref 11.6–14.5)
RBC # BLD AUTO: 3.01 MILLION/UL (ref 3.88–5.62)
SODIUM SERPL-SCNC: 139 MMOL/L (ref 136–145)
TIBC SERPL-MCNC: 254 UG/DL (ref 250–450)
VANCOMYCIN TROUGH SERPL-MCNC: 25.2 UG/ML (ref 10–20)
WBC # BLD AUTO: 6.11 THOUSAND/UL (ref 4.31–10.16)

## 2019-11-01 PROCEDURE — 99232 SBSQ HOSP IP/OBS MODERATE 35: CPT | Performed by: INTERNAL MEDICINE

## 2019-11-01 PROCEDURE — 86235 NUCLEAR ANTIGEN ANTIBODY: CPT | Performed by: NURSE PRACTITIONER

## 2019-11-01 PROCEDURE — NC001 PR NO CHARGE: Performed by: NURSE PRACTITIONER

## 2019-11-01 PROCEDURE — 80202 ASSAY OF VANCOMYCIN: CPT | Performed by: HOSPITALIST

## 2019-11-01 PROCEDURE — 86803 HEPATITIS C AB TEST: CPT | Performed by: NURSE PRACTITIONER

## 2019-11-01 PROCEDURE — 83540 ASSAY OF IRON: CPT | Performed by: NURSE PRACTITIONER

## 2019-11-01 PROCEDURE — 82390 ASSAY OF CERULOPLASMIN: CPT | Performed by: NURSE PRACTITIONER

## 2019-11-01 PROCEDURE — 82728 ASSAY OF FERRITIN: CPT | Performed by: NURSE PRACTITIONER

## 2019-11-01 PROCEDURE — 83010 ASSAY OF HAPTOGLOBIN QUANT: CPT | Performed by: INTERNAL MEDICINE

## 2019-11-01 PROCEDURE — 94760 N-INVAS EAR/PLS OXIMETRY 1: CPT

## 2019-11-01 PROCEDURE — 80053 COMPREHEN METABOLIC PANEL: CPT | Performed by: NURSE PRACTITIONER

## 2019-11-01 PROCEDURE — 85610 PROTHROMBIN TIME: CPT | Performed by: HOSPITALIST

## 2019-11-01 PROCEDURE — 86256 FLUORESCENT ANTIBODY TITER: CPT | Performed by: NURSE PRACTITIONER

## 2019-11-01 PROCEDURE — 86038 ANTINUCLEAR ANTIBODIES: CPT | Performed by: NURSE PRACTITIONER

## 2019-11-01 PROCEDURE — 82948 REAGENT STRIP/BLOOD GLUCOSE: CPT

## 2019-11-01 PROCEDURE — 85025 COMPLETE CBC W/AUTO DIFF WBC: CPT | Performed by: HOSPITALIST

## 2019-11-01 PROCEDURE — 97530 THERAPEUTIC ACTIVITIES: CPT

## 2019-11-01 PROCEDURE — 87340 HEPATITIS B SURFACE AG IA: CPT | Performed by: NURSE PRACTITIONER

## 2019-11-01 PROCEDURE — 99233 SBSQ HOSP IP/OBS HIGH 50: CPT | Performed by: HOSPITALIST

## 2019-11-01 PROCEDURE — 83550 IRON BINDING TEST: CPT | Performed by: NURSE PRACTITIONER

## 2019-11-01 PROCEDURE — 94640 AIRWAY INHALATION TREATMENT: CPT

## 2019-11-01 RX ORDER — VANCOMYCIN HYDROCHLORIDE 1 G/200ML
1000 INJECTION, SOLUTION INTRAVENOUS EVERY 12 HOURS
Status: DISCONTINUED | OUTPATIENT
Start: 2019-11-01 | End: 2019-11-03 | Stop reason: SDUPTHER

## 2019-11-01 RX ADMIN — FLUTICASONE FUROATE AND VILANTEROL TRIFENATATE 1 PUFF: 200; 25 POWDER RESPIRATORY (INHALATION) at 08:15

## 2019-11-01 RX ADMIN — ALLOPURINOL 300 MG: 300 TABLET ORAL at 09:06

## 2019-11-01 RX ADMIN — VANCOMYCIN HYDROCHLORIDE 1500 MG: 1 INJECTION, POWDER, LYOPHILIZED, FOR SOLUTION INTRAVENOUS at 09:09

## 2019-11-01 RX ADMIN — FUROSEMIDE 40 MG: 10 INJECTION, SOLUTION INTRAMUSCULAR; INTRAVENOUS at 05:14

## 2019-11-01 RX ADMIN — FUROSEMIDE 40 MG: 10 INJECTION, SOLUTION INTRAMUSCULAR; INTRAVENOUS at 12:36

## 2019-11-01 RX ADMIN — SODIUM CHLORIDE 100 ML/HR: 0.9 INJECTION, SOLUTION INTRAVENOUS at 09:06

## 2019-11-01 RX ADMIN — ATORVASTATIN CALCIUM 20 MG: 20 TABLET, FILM COATED ORAL at 09:06

## 2019-11-01 RX ADMIN — FUROSEMIDE 40 MG: 10 INJECTION, SOLUTION INTRAMUSCULAR; INTRAVENOUS at 17:32

## 2019-11-01 RX ADMIN — INSULIN LISPRO 30 UNITS: 100 INJECTION, SOLUTION INTRAVENOUS; SUBCUTANEOUS at 09:08

## 2019-11-01 RX ADMIN — WARFARIN SODIUM 7.5 MG: 7.5 TABLET ORAL at 17:32

## 2019-11-01 RX ADMIN — ACYCLOVIR 400 MG: 800 TABLET ORAL at 09:07

## 2019-11-01 RX ADMIN — VANCOMYCIN HYDROCHLORIDE 1000 MG: 1 INJECTION, SOLUTION INTRAVENOUS at 23:12

## 2019-11-01 RX ADMIN — INSULIN LISPRO 30 UNITS: 100 INJECTION, SOLUTION INTRAVENOUS; SUBCUTANEOUS at 16:54

## 2019-11-01 RX ADMIN — SODIUM CHLORIDE 100 ML/HR: 0.9 INJECTION, SOLUTION INTRAVENOUS at 09:08

## 2019-11-01 RX ADMIN — INSULIN LISPRO 30 UNITS: 100 INJECTION, SOLUTION INTRAVENOUS; SUBCUTANEOUS at 12:35

## 2019-11-01 RX ADMIN — METOPROLOL TARTRATE 25 MG: 25 TABLET ORAL at 22:28

## 2019-11-01 RX ADMIN — CEFEPIME HYDROCHLORIDE 1000 MG: 1 INJECTION, SOLUTION INTRAVENOUS at 05:50

## 2019-11-01 RX ADMIN — SODIUM CHLORIDE 100 ML/HR: 0.9 INJECTION, SOLUTION INTRAVENOUS at 00:48

## 2019-11-01 RX ADMIN — ALBUTEROL SULFATE 2 PUFF: 90 AEROSOL, METERED RESPIRATORY (INHALATION) at 08:15

## 2019-11-01 RX ADMIN — CEFEPIME HYDROCHLORIDE 1000 MG: 1 INJECTION, SOLUTION INTRAVENOUS at 15:45

## 2019-11-01 RX ADMIN — METOPROLOL TARTRATE 25 MG: 25 TABLET ORAL at 09:06

## 2019-11-01 NOTE — ASSESSMENT & PLAN NOTE
Wt Readings from Last 3 Encounters:   11/01/19 (!) 149 kg (327 lb 9 7 oz)   10/20/19 (!) 159 kg (350 lb 8 5 oz)   10/15/19 (!) 159 kg (351 lb 6 6 oz)       · Prior echo with an EF of 59% grade 3 diastolic dysfunction  · Cont IV lasix, monitor Cr  · CTA negative

## 2019-11-01 NOTE — ASSESSMENT & PLAN NOTE
-DDx includes R-sided CHF, SE of Velcade  -MRCP: Examination significantly limited by body habitus  Loulou Caulk is no gross choledocholithiasis   CBD normal in diameter  Cholelithiasis with filling defects noted in the gallbladder neck   No surrounding inflammation  Cardiomegaly with small bilateral pleural effusions  Partially imaged masslike lesion anterior to the left psoas muscle as seen on images 5/16 and 1402/95 measuring approximately 4 5 x 2 5 cm  This could represent adenopathy or other mass   CT scan of the abdomen and pelvis with contrast recommended  -RUQ U/S: Evidence of gallstone with borderline wall thickening  The common duct is normal in caliber but small stone within the duct is possible versus adjacent fat  Direct biliary imaging would be useful for further assessment especially given transaminitis    -GI following

## 2019-11-01 NOTE — ASSESSMENT & PLAN NOTE
· Patient stated Ilya Figures was worsening over the few weeks PTA  · CTA negative for PE  · Positive lower extremity edema  · Saturations 97% on room air  · Likely due to morbid obesity/NALLELY/OHS/deconditioning

## 2019-11-01 NOTE — ASSESSMENT & PLAN NOTE
· Patient presented with worsening right lower extremity swelling with small blister on dorsal part of his right foot  · Continue cefepime/vancomycin given prior wound cultures positive for MRSA and Proteus  · X-ray R foot: No radiographic evidence of osteomyelitis or soft tissue air    · Procalcitonin 0 17  · Blood cultures NGTD  · Appreciate ID

## 2019-11-01 NOTE — PLAN OF CARE
Problem: DISCHARGE PLANNING - CARE MANAGEMENT  Goal: Discharge to post-acute care or home with appropriate resources  Description  INTERVENTIONS:  - Conduct assessment to determine patient/family and health care team treatment goals, and need for post-acute services based on payer coverage, community resources, and patient preferences, and barriers to discharge  - Address psychosocial, clinical, and financial barriers to discharge as identified in assessment in conjunction with the patient/family and health care team  - Arrange appropriate level of post-acute services according to patient's   needs and preference and payer coverage in collaboration with the physician and health care team  - Communicate with and update the patient/family, physician, and health care team regarding progress on the discharge plan  - Arrange appropriate transportation to post-acute venues   Outcome: Progressing     Problem: Nutrition/Hydration-ADULT  Goal: Nutrient/Hydration intake appropriate for improving, restoring or maintaining nutritional needs  Description  Monitor and assess patient's nutrition/hydration status for malnutrition  Collaborate with interdisciplinary team and initiate plan and interventions as ordered  Monitor patient's weight and dietary intake as ordered or per policy  Utilize nutrition screening tool and intervene as necessary  Determine patient's food preferences and provide high-protein, high-caloric foods as appropriate       INTERVENTIONS:  - Monitor oral intake, urinary output, labs, and treatment plans  - Assess nutrition and hydration status and recommend course of action  - Evaluate amount of meals eaten  - Assist patient with eating if necessary   - Allow adequate time for meals  - Recommend/ encourage appropriate diets, oral nutritional supplements, and vitamin/mineral supplements  - Order, calculate, and assess calorie counts as needed  - Recommend, monitor, and adjust tube feedings and TPN/PPN based on assessed needs  - Assess need for intravenous fluids  - Provide specific nutrition/hydration education as appropriate  - Include patient/family/caregiver in decisions related to nutrition  Outcome: Progressing     Problem: PAIN - ADULT  Goal: Verbalizes/displays adequate comfort level or baseline comfort level  Description  Interventions:  - Encourage patient to monitor pain and request assistance  - Assess pain using appropriate pain scale  - Administer analgesics based on type and severity of pain and evaluate response  - Implement non-pharmacological measures as appropriate and evaluate response  - Consider cultural and social influences on pain and pain management  - Notify physician/advanced practitioner if interventions unsuccessful or patient reports new pain  Outcome: Progressing     Problem: INFECTION - ADULT  Goal: Absence or prevention of progression during hospitalization  Description  INTERVENTIONS:  - Assess and monitor for signs and symptoms of infection  - Monitor lab/diagnostic results  - Monitor all insertion sites, i e  indwelling lines, tubes, and drains  - Monitor endotracheal if appropriate and nasal secretions for changes in amount and color  - [place] appropriate cooling/warming therapies per order  - Administer medications as ordered  - Instruct and encourage patient and family to use good hand hygiene technique  - Identify and instruct in appropriate isolation precautions for identified infection/condition  Outcome: Progressing     Problem: SAFETY ADULT  Goal: Patient will remain free of falls  Description  INTERVENTIONS:  - Assess patient frequently for physical needs  -  Identify cognitive and physical deficits and behaviors that affect risk of falls    -  [place] fall precautions as indicated by assessment   - Educate patient/family on patient safety including physical limitations  - Instruct patient to call for assistance with activity based on assessment  - Modify environment to reduce risk of injury  - Consider OT/PT consult to assist with strengthening/mobility  Outcome: Progressing  Goal: Maintain or return to baseline ADL function  Description  INTERVENTIONS:  -  Assess patient's ability to carry out ADLs; assess patient's baseline for ADL function and identify physical deficits which impact ability to perform ADLs (bathing, care of mouth/teeth, toileting, grooming, dressing, etc )  - Assess/evaluate cause of self-care deficits   - Assess range of motion  - Assess patient's mobility; develop plan if impaired  - Assess patient's need for assistive devices and provide as appropriate  - Encourage maximum independence but intervene and supervise when necessary  - Involve family in performance of ADLs  - Assess for home care needs following discharge   - Consider OT consult to assist with ADL evaluation and planning for discharge  - Provide patient education as appropriate  Outcome: Progressing  Goal: Maintain or return mobility status to optimal level  Description  INTERVENTIONS:  - Assess patient's baseline mobility status (ambulation, transfers, stairs, etc )    - Identify cognitive and physical deficits and behaviors that affect mobility  - Identify mobility aids required to assist with transfers and/or ambulation (gait belt, sit-to-stand, lift, walker, cane, etc )  - [place] fall precautions as indicated by assessment  - Record patient progress and toleration of activity level on Mobility SBAR; progress patient to next Phase/Stage  - Instruct patient to call for assistance with activity based on assessment  - Consider rehabilitation consult to assist with strengthening/weightbearing, etc   Outcome: Progressing     Problem: DISCHARGE PLANNING  Goal: Discharge to home or other facility with appropriate resources  Description  INTERVENTIONS:  - Identify barriers to discharge w/patient and caregiver  - Arrange for needed discharge resources and transportation as appropriate  - Identify discharge learning needs (meds, wound care, etc )  - Arrange for interpretive services to assist at discharge as needed  - Refer to Case Management Department for coordinating discharge planning if the patient needs post-hospital services based on physician/advanced practitioner order or complex needs related to functional status, cognitive ability, or social support system  Outcome: Progressing     Problem: Knowledge Deficit  Goal: Patient/family/caregiver demonstrates understanding of disease process, treatment plan, medications, and discharge instructions  Description  Complete learning assessment and assess knowledge base  Interventions:  - Provide teaching at level of understanding  - Provide teaching via preferred learning methods  Outcome: Progressing     Problem: Prexisting or High Potential for Compromised Skin Integrity  Goal: Skin integrity is maintained or improved  Description  INTERVENTIONS:  - Identify patients at risk for skin breakdown  - Assess and monitor skin integrity  - Assess and monitor nutrition and hydration status  - Monitor labs   - Assess for incontinence   - Turn and reposition patient  - Assist with mobility/ambulation  - Relieve pressure over bony prominences  - Avoid friction and shearing  - Provide appropriate hygiene as needed including keeping skin clean and dry  - Evaluate need for skin moisturizer/barrier cream  - Collaborate with interdisciplinary team   - Patient/family teaching  - Consider wound care consult   Outcome: Progressing     Problem: Potential for Falls  Goal: Patient will remain free of falls  Description  INTERVENTIONS:  - Assess patient frequently for physical needs  -  Identify cognitive and physical deficits and behaviors that affect risk of falls    -  [place] fall precautions as indicated by assessment   - Educate patient/family on patient safety including physical limitations  - Instruct patient to call for assistance with activity based on assessment  - Modify environment to reduce risk of injury  - Consider OT/PT consult to assist with strengthening/mobility  Outcome: Progressing

## 2019-11-01 NOTE — ASSESSMENT & PLAN NOTE
Lab Results   Component Value Date    HGBA1C 9 4 (H) 12/27/2018       Recent Labs     10/31/19  1504 10/31/19  1809 10/31/19  2115 11/01/19  0709   POCGLU 140 74 131 135       Blood Sugar Average: Last 72 hrs:  · (P) 115 [id number]   · continue Lantus/sliding-scale insulin  · Hold home metformin

## 2019-11-01 NOTE — PLAN OF CARE
Problem: PHYSICAL THERAPY ADULT  Goal: Performs mobility at highest level of function for planned discharge setting  See evaluation for individualized goals  Description  Treatment/Interventions: ADL retraining, Functional transfer training, LE strengthening/ROM, Patient/family training, Endurance training, Therapeutic exercise  Equipment Recommended: Wheelchair       See flowsheet documentation for full assessment, interventions and recommendations  Outcome: Progressing  Note:   Prognosis: Good  Problem List: Decreased strength, Decreased endurance, Impaired balance, Decreased mobility  Assessment: Pt able to perform bed mobility ind  Encouraged cough and deep breathe sitting EOB  Able to transfer sit-stand with oscar RW and mod A of 1 Bumped up to [facility] seated   Needs in reach after session  Encouraged that he was able to achieve standing  Continue to recommend in-pt rehab atd/c to regain safe ind transfers  Barriers to Discharge: (medical status)     Recommendation: Short-term skilled PT     PT - OK to Discharge: No    See flowsheet documentation for full assessment

## 2019-11-01 NOTE — PROGRESS NOTES
[place]  81 y o   male  1950  mrn [id number]    Assessment/Plan:    RLE cellulitis: improving with broad spectrum abx, no more fevers, wbc is down    Psoas mass? - patient has never been bacteremic so doubt abscess, but should be followed    Subjective:    MRI findings noted, no leg pain, less swelling, states he is having diarrhea, but nursing confrims solid stool    Objective:    Gen: nad  Cor: rrr s1s2  Abd: soft  RLE: less swelling and erythema    Labs:  CBC w/diff  Recent Labs     11/01/19 0530   WBC 6 11   HGB 8 4*   HCT 27 9*      NEUTOPHILPCT 73   LYMPHOPCT 13*   MONOPCT 8   EOSPCT 4     BMP  Recent Labs     11/01/19 0530   K 3 6      CO2 27   BUN 36*   CREATININE 1 38*   CALCIUM 8 4     CMP  Recent Labs     11/01/19 0530   K 3 6      CO2 27   BUN 36*   CREATININE 1 38*   CALCIUM 8 4   ALKPHOS 349*   ALT 39   AST 24        labrc    Cultures:  Lab Results   Component Value Date    BLOODCX No Growth at 48 hrs  10/29/2019    BLOODCX No Growth at 48 hrs  10/29/2019    BLOODCX No Growth After 5 Days  07/25/2019    BLOODCX No Growth After 5 Days  07/25/2019    BLOODCX No Growth After 5 Days  12/25/2018    BLOODCX No Growth After 5 Days  12/25/2018    BLOODCX No Growth After 5 Days  03/23/2018    BLOODCX No Growth After 5 Days  03/23/2018    BLOODCX No Growth After 5 Days  10/08/2017    BLOODCX No Growth After 5 Days  10/08/2017    BLOODCX No Growth After 5 Days  09/15/2016    BLOODCX No Growth After 5 Days   09/15/2016     Lab Results   Component Value Date    WOUNDCULT 4+ Growth of Proteus mirabilis (A) 07/25/2019    WOUNDCULT 4+ Growth of  07/25/2019    WOUNDCULT (A) 07/25/2019     4+ Growth of Methicillin Resistant Staphylococcus aureus    WOUNDCULT 1+ Growth of Proteus mirabilis (A) 07/25/2019    WOUNDCULT 4+ Growth of  07/25/2019    WOUNDCULT 3+ Growth of Staphylococcus aureus (A) 12/25/2018    WOUNDCULT 2+ Growth of  12/25/2018    WOUNDCULT 4+ Growth of Staphylococcus aureus 09/15/2016    WOUNDCULT 4+ Growth of Proteus mirabilis 09/15/2016    WOUNDCULT 4+ Growth of Mixed Skin Nini 09/15/2016     No results found for: URINECX  No results found for: SPUTUMCULTUR    MED: reviewed      Current Facility-Administered Medications:     acetaminophen (TYLENOL) tablet 650 mg, 650 mg, Oral, Q6H PRN, NICOLA Shaw, 650 mg at 10/29/19 2236    acyclovir (ZOVIRAX) tablet 400 mg, 400 mg, Oral, Daily, NICOLA Pfeiffer, 400 mg at 11/01/19 0907    albuterol (PROVENTIL HFA,VENTOLIN HFA) inhaler 2 puff, 2 puff, Inhalation, Q6H PRN, NICOLA Shaw, 2 puff at 11/01/19 0815    allopurinol (ZYLOPRIM) tablet 300 mg, 300 mg, Oral, Daily, NICOLA Pfeiffer, 300 mg at 11/01/19 0906    atorvastatin (LIPITOR) tablet 20 mg, 20 mg, Oral, Daily, NICOLA Pfeiffer, 20 mg at 11/01/19 8468    bisacodyl (DULCOLAX) rectal suppository 10 mg, 10 mg, Rectal, PRN, NICOLA Shaw    cefepime (MAXIPIME) 1 g/50 mL dextrose IVPB, 1,000 mg, Intravenous, Q12H, NICOLA Shaw, Stopped at 11/01/19 0620    fluticasone-vilanterol (BREO ELLIPTA) 200-25 MCG/INH inhaler 1 puff, 1 puff, Inhalation, Daily, NICOLA Pfeiffer, 1 puff at 11/01/19 0815    furosemide (LASIX) injection 40 mg, 40 mg, Intravenous, TID (diuretic), NICOLA Shaw, 40 mg at 11/01/19 0514    insulin glargine (LANTUS) subcutaneous injection 18 Units 0 18 mL, 18 Units, Subcutaneous, HS, NICOLA Pfeiffer, 18 Units at 10/31/19 2204    insulin lispro (HumaLOG) 100 units/mL subcutaneous injection 1-6 Units, 1-6 Units, Subcutaneous, HS, NICOLA Pfeiffer    insulin lispro (HumaLOG) 100 units/mL subcutaneous injection 2-12 Units, 2-12 Units, Subcutaneous, TID AC, 2 Units at 10/30/19 1213 **AND** Fingerstick Glucose (POCT), , , TID AC, NICOLA Pfeiffer    insulin lispro (HumaLOG) 100 units/mL subcutaneous injection 30 Units, 30 Units, Subcutaneous, TID With Meals, NICOLA Hernandez, [place] Units at 11/01/19 0908    metoprolol tartrate (LOPRESSOR) tablet 25 mg, 25 mg, Oral, Q12H, NICOLA Pfeiffer, 25 mg at 11/01/19 0906    vancomycin (VANCOCIN) 1,500 mg in sodium chloride 0 9 % 250 mL IVPB, 1,500 mg, Intravenous, Q12H, NICOLA Pfeiffer, Last Rate: 166 7 mL/hr at 11/01/19 0909, 1,500 mg at 11/01/19 0909    warfarin (COUMADIN) tablet 7 5 mg, 7 5 mg, Oral, Daily (warfarin), NICOLA Hernandez, 7 5 mg at 10/31/19 1805    Principal Problem:    Cellulitis  Active Problems:    Diabetes mellitus type 2, uncontrolled (HCC)    Chronic atrial fibrillation    Morbid obesity (HCC)    Acute on chronic diastolic congestive heart failure (HCC)    Hyperlipidemia    SOB (shortness of breath)    Moderate persistent asthma without complication    Multiple myeloma not having achieved remission ([facility] [address] )    Weakness    Transaminitis    Elevated troponin    Mass of psoas muscle      Lindsay Gerber MD

## 2019-11-01 NOTE — PROGRESS NOTES
Progress Note - Shahab Taveras 1950, 81 y o  male MRN: [id number]    Unit/Bed#: [address] [place] [address] Encounter: [id number]    Primary Care Provider: Moriah Rosenberg MD   Date and time admitted to hospital: 10/29/2019  1:43 PM        Mass of psoas muscle  Assessment & Plan  -noted on MRCP  -cannot get CT with contrast at this time due to Cr  Will continue to trend Cr and check CT when benefit>risk    -on 11/1/19 at 0935, I (Dr Lincoln Trimble), informed the pt of this finding  He verbally acknowledged understanding of the finding in agreement with holding off on the CT scan due to risk of contrast induced nephropathy as well as the risk being greater than the benefit at this moment  Elevated troponin  Assessment & Plan  · EKG with AFib no acute ST changes  · troponins indeterminate (non-MI trop elevation)     Transaminitis  Assessment & Plan  -DDx includes R-sided CHF, SE of Velcade  -MRCP: Examination significantly limited by body habitus  Nai Van is no gross choledocholithiasis   CBD normal in diameter  Cholelithiasis with filling defects noted in the gallbladder neck   No surrounding inflammation  Cardiomegaly with small bilateral pleural effusions  Partially imaged masslike lesion anterior to the left psoas muscle as seen on images 5/16 and 1402/95 measuring approximately 4 5 x 2 5 cm  This could represent adenopathy or other mass   CT scan of the abdomen and pelvis with contrast recommended  -RUQ U/S: Evidence of gallstone with borderline wall thickening  The common duct is normal in caliber but small stone within the duct is possible versus adjacent fat  Direct biliary imaging would be useful for further assessment especially given transaminitis    -GI following    Weakness  Assessment & Plan  · PT/OT     Multiple myeloma not having achieved remission [facility])  Assessment & Plan  · Recently diagnosed 09/2019  · Patient was scheduled for Velcade on day of admission however due to weakness presented to the emergency room  · Outpatient Oncology follow-up    Moderate persistent asthma without complication  Assessment & Plan  · Without acute exacerbation  · Continue home Advair    SOB (shortness of breath)  Assessment & Plan  · Patient stated CROCKER was worsening over the few weeks PTA  · CTA negative for PE  · Positive lower extremity edema  · Saturations 97% on room air  · Likely due to morbid obesity/NALLELY/OHS/deconditioning    Hyperlipidemia  Assessment & Plan  · Continue home statin    Acute on chronic diastolic congestive heart failure (HCC)  Assessment & Plan  Wt Readings from Last 3 Encounters:   11/01/19 (!) 149 kg (327 lb 9 7 oz)   10/20/19 (!) 159 kg (350 lb 8 5 oz)   10/15/19 (!) 159 kg (351 lb 6 6 oz)       · Prior echo with an EF of 12% grade 3 diastolic dysfunction  · Cont IV lasix, monitor Cr  · CTA negative        Morbid obesity ([facility] [address] )  Assessment & Plan  · Dietary education    Chronic atrial fibrillation  Assessment & Plan  · Continue home beta-blocker/Coumadin  · Follow INR    Diabetes mellitus type 2, uncontrolled ([facility] [address] )  Assessment & Plan  Lab Results   Component Value Date    HGBA1C 9 4 (H) 12/27/2018       Recent Labs     10/31/19  1504 10/31/19  1809 10/31/19  2115 11/01/19  0709   POCGLU 140 74 131 135       Blood Sugar Average: Last 72 hrs:  · (P) 115 [id number]   · continue Lantus/sliding-scale insulin  · Hold home metformin    * Cellulitis  Assessment & Plan  · Patient presented with worsening right lower extremity swelling with small blister on dorsal part of his right foot  · Continue cefepime/vancomycin given prior wound cultures positive for MRSA and Proteus  · X-ray R foot: No radiographic evidence of osteomyelitis or soft tissue air  · Procalcitonin 0 17  · Blood cultures NGTD  · Appreciate ID      Acute kidney failure, POA, in the setting of cellulitis as evidenced by creat levels 1 37>1 21>1 48>1 38, treated with repeat BMP labs, I/O, daily weights and holding CT scan        VTE Pharmacologic Prophylaxis:   Pharmacologic: Warfarin (Coumadin)  Mechanical VTE Prophylaxis in Place: No    Patient Centered Rounds: I have performed bedside rounds with nursing staff today  Education and Discussions with Family / Patient: Pt    Time Spent for Care: 20 minutes  More than 50% of total time spent on counseling and coordination of care as described above  Current Length of Stay: 3 day(s)    Current Patient Status: Inpatient   Certification Statement: The patient will continue to require additional inpatient hospital stay due to RLE cellulitis    Discharge Plan: 3+ days    Code Status: Level 1 - Full Code      Subjective:   Patient without new complaints today  Objective:     Vitals:   Temp (24hrs), Av 5 °F (36 9 °C), Min:98 1 °F (36 7 °C), Max:99 1 °F (37 3 °C)    Temp:  [98 1 °F (36 7 °C)-99 1 °F (37 3 °C)] 98 1 °F (36 7 °C)  HR:  [82-86] 84  Resp:  [18-20] 18  BP: (125-142)/(58-71) 141/68  SpO2:  [90 %-96 %] 90 %  Body mass index is 40 95 kg/m²  Input and Output Summary (last 24 hours): Intake/Output Summary (Last 24 hours) at 2019 0946  Last data filed at 2019 0530  Gross per 24 hour   Intake --   Output 1825 ml   Net -1825 ml       Physical Exam:     Physical Exam  Gen: NAD, AAOx3, well developed, well nourished  Eyes: EOMI, PERRLA, no scleral icterus  ENMT:  no nasal discharge, no otic discharge, moist mucous membranes  Neck:  Supple  Cardiovascular:  Regular rate and rhythm, normal S1-S2, no murmurs, rubs, or gallops  Lungs:  Clear to auscultation bilaterally, no wheezes, or rales, or rhonchi  Abdomen:  Positive bowel sounds, soft, nontender, nondistended, no palpable organomegaly   Skin:  RLE cellulitis, much improved from yesterday   2+ RLE pitting edema (improved from yesterday)  Neuro: Cranial nerves 2-12 are intact, non-focal, 5/5 strength in all 4 extremities      Additional Data:     Labs:    Results from last 7 days   Lab Units 19  0530   WBC Thousand/uL 6 11 HEMOGLOBIN g/dL 8 4*   HEMATOCRIT % 27 9*   PLATELETS Thousands/uL 335   NEUTROS PCT % 73   LYMPHS PCT % 13*   MONOS PCT % 8   EOS PCT % 4     Results from last 7 days   Lab Units 11/01/19  0530   SODIUM mmol/L 139   POTASSIUM mmol/L 3 6   CHLORIDE mmol/L 103   CO2 mmol/L 27   BUN mg/dL 36*   CREATININE mg/dL 1 38*   ANION GAP mmol/L 9   CALCIUM mg/dL 8 4   ALBUMIN g/dL 2 3*   TOTAL BILIRUBIN mg/dL 1 30*   ALK PHOS U/L 349*   ALT U/L 39   AST U/L 24   GLUCOSE RANDOM mg/dL 129     Results from last 7 days   Lab Units 11/01/19  0530   INR  2 11*     Results from last 7 days   Lab Units 11/01/19  0709 10/31/19  2115 10/31/19  1809 10/31/19  1504 10/31/19  1131 10/31/19  0623 10/30/19  2050 10/30/19  1610 10/30/19  1105 10/30/19  0708 10/29/19  2112   POC GLUCOSE mg/dl 135 131 74 140 95 114 96 112 193* 103 81         Results from last 7 days   Lab Units 10/29/19  2135 10/29/19  1424   LACTIC ACID mmol/L  --  1 8   PROCALCITONIN ng/ml 0 17  --            * I Have Reviewed All Lab Data Listed Above  * Additional Pertinent Lab Tests Reviewed: [facility] Admission Reviewed    Recent Cultures (last 7 days):     Results from last 7 days   Lab Units 10/29/19  1424   BLOOD CULTURE  No Growth at 48 hrs  No Growth at 48 hrs         Last 24 Hours Medication List:     Current Facility-Administered Medications:  acetaminophen 650 mg Oral Q6H PRN NICOLA Ramos    acyclovir 400 mg Oral Daily NICOLA Pfeiffer    albuterol 2 puff Inhalation Q6H PRN NICOLA Ramos    allopurinol 300 mg Oral Daily NICOLA Pfeiffer    atorvastatin 20 mg Oral Daily NICOLA Pfeiffer    bisacodyl 10 mg Rectal PRN NICOLA Ramos    cefepime 1,000 mg Intravenous Q12H NICOLA Ramos Last Rate: Stopped (11/01/19 2775)   fluticasone-vilanterol 1 puff Inhalation Daily NICOLA Pfeiffer    furosemide 40 mg Intravenous TID (diuretic) NICOLA Ramos    insulin glargine 18 Units Subcutaneous HS NICOLA Pfeiffer    insulin lispro 1-6 Units Subcutaneous HS NICOLA Pfeiffer    insulin lispro 2-12 Units Subcutaneous TID AC NICOLA Pfeiffer    insulin lispro 30 Units Subcutaneous TID With Meals NICOLA Brower    metoprolol tartrate 25 mg Oral Q12H NICOLA Pfeiffer    vancomycin 1,500 mg Intravenous Q12H NICOLA Pfeiffer Last Rate: 1,500 mg (11/01/19 5134)   warfarin 7 5 mg Oral Daily (warfarin) NICOLA Brower         Today, Patient Was Seen By: Casi Dyson MD    ** Please Note: Dictation voice to text software may have been used in the creation of this document   **

## 2019-11-01 NOTE — PHYSICAL THERAPY NOTE
PT tx     11/01/19 1515   Pain Assessment   Pain Assessment No/denies pain   Pain Score No Pain   Restrictions/Precautions   Other Precautions Contact/isolation   General   Chart Reviewed Yes   Additional Pertinent History unable to have abdom CTscan with contrast at present   Cognition   Overall Cognitive Status [facility]   Arousal/Participation Alert   Attention Within functional limits   Orientation Level Oriented X4   Memory Within functional limits   Following Commands Follows one step commands with increased time or repetition   Comments appears depressed   Subjective   Subjective I can't do anything  Reassured that pt must practice and build up strength   Bed Mobility   Rolling R 7  Independent   Additional items Bedrails;HOB elevated   Rolling L 7  Independent   Additional items HOB elevated; Bedrails   Supine to Sit 7  Independent   Additional items Bedrails   Sit to Supine 7  Independent   Additional items Bedrails   Transfers   Sit to Stand 3  Moderate assistance   Additional items Assist x 1; Increased time required; Impulsive;Verbal cues  (bed elevated)   Stand to Sit 5  Supervision   Ambulation/Elevation   Gait pattern   (n/a)   Balance   Static Sitting Normal   Dynamic Sitting Good   Static Standing Fair   Dynamic Standing Fair -   Endurance Deficit   Endurance Deficit Yes   Endurance Deficit Description tires quickly    Activity Tolerance   Activity Tolerance Patient limited by fatigue   Nurse Made Aware yes   Exercises   Balance training  stand with RW x 15 sec   Assessment   Prognosis Good   Problem List Decreased strength;Decreased endurance; Impaired balance;Decreased mobility   Assessment Pt able to perform bed mobility ind  Encouraged cough and deep breathe sitting EOB  Able to transfer sit-stand with oscar RW and mod A of 1 Bumped up to [facility] seated   Needs in reach after session  Encouraged that he was able to achieve standing   Continue to recommend in-pt rehab atd/c to regain safe ind transfers Barriers to Discharge   (medical status)   Goals   Patient Goals get better   Plan   Treatment/Interventions ADL retraining;Functional transfer training; Therapeutic exercise; Endurance training;Spoke to case management;Spoke to nursing   Progress Slow progress, medical status limitations   PT Frequency 5x/wk   Recommendation   Recommendation Short-term skilled PT   Equipment Recommended Walker; Wheelchair   PT - OK to Discharge Raven Bennett, PT

## 2019-11-01 NOTE — ASSESSMENT & PLAN NOTE
-noted on MRCP  -cannot get CT with contrast at this time due to Cr  Will continue to trend Cr and check CT when benefit>risk    -on 11/1/19 at 0935, I (Dr Campbell Conroy), informed the pt of this finding  He verbally acknowledged understanding of the finding in agreement with holding off on the CT scan due to risk of contrast induced nephropathy as well as the risk being greater than the benefit at this moment

## 2019-11-02 LAB
ACTIN IGG SERPL-ACNC: 2 UNITS (ref 0–19)
ANION GAP SERPL CALCULATED.3IONS-SCNC: 10 MMOL/L (ref 4–13)
BASOPHILS # BLD AUTO: 0.04 THOUSANDS/ΜL (ref 0–0.1)
BASOPHILS NFR BLD AUTO: 1 % (ref 0–1)
BUN SERPL-MCNC: 35 MG/DL (ref 5–25)
CALCIUM SERPL-MCNC: 8.5 MG/DL (ref 8.3–10.1)
CERULOPLASMIN SERPL-MCNC: 45.2 MG/DL (ref 16–31)
CHLORIDE SERPL-SCNC: 102 MMOL/L (ref 100–108)
CO2 SERPL-SCNC: 26 MMOL/L (ref 21–32)
CREAT SERPL-MCNC: 1.24 MG/DL (ref 0.6–1.3)
EOSINOPHIL # BLD AUTO: 0.21 THOUSAND/ΜL (ref 0–0.61)
EOSINOPHIL NFR BLD AUTO: 4 % (ref 0–6)
ERYTHROCYTE [DISTWIDTH] IN BLOOD BY AUTOMATED COUNT: 16.5 % (ref 11.6–15.1)
GFR SERPL CREATININE-BSD FRML MDRD: 60 ML/MIN/1.73SQ M
GLUCOSE SERPL-MCNC: 102 MG/DL (ref 65–140)
GLUCOSE SERPL-MCNC: 123 MG/DL (ref 65–140)
GLUCOSE SERPL-MCNC: 127 MG/DL (ref 65–140)
GLUCOSE SERPL-MCNC: 153 MG/DL (ref 65–140)
GLUCOSE SERPL-MCNC: 88 MG/DL (ref 65–140)
HAPTOGLOB SERPL-MCNC: <10 MG/DL (ref 34–200)
HCT VFR BLD AUTO: 30.1 % (ref 36.5–49.3)
HGB BLD-MCNC: 8.8 G/DL (ref 12–17)
IMM GRANULOCYTES # BLD AUTO: 0.07 THOUSAND/UL (ref 0–0.2)
IMM GRANULOCYTES NFR BLD AUTO: 1 % (ref 0–2)
INR PPP: 2.11 (ref 0.84–1.19)
LYMPHOCYTES # BLD AUTO: 0.78 THOUSANDS/ΜL (ref 0.6–4.47)
LYMPHOCYTES NFR BLD AUTO: 13 % (ref 14–44)
MCH RBC QN AUTO: 27.6 PG (ref 26.8–34.3)
MCHC RBC AUTO-ENTMCNC: 29.2 G/DL (ref 31.4–37.4)
MCV RBC AUTO: 94 FL (ref 82–98)
MITOCHONDRIA M2 IGG SER-ACNC: <20 UNITS (ref 0–20)
MONOCYTES # BLD AUTO: 0.47 THOUSAND/ΜL (ref 0.17–1.22)
MONOCYTES NFR BLD AUTO: 8 % (ref 4–12)
NEUTROPHILS # BLD AUTO: 4.5 THOUSANDS/ΜL (ref 1.85–7.62)
NEUTS SEG NFR BLD AUTO: 73 % (ref 43–75)
NRBC BLD AUTO-RTO: 0 /100 WBCS
PLATELET # BLD AUTO: 335 THOUSANDS/UL (ref 149–390)
PMV BLD AUTO: 9.7 FL (ref 8.9–12.7)
POTASSIUM SERPL-SCNC: 3.8 MMOL/L (ref 3.5–5.3)
PROTHROMBIN TIME: 23.3 SECONDS (ref 11.6–14.5)
RBC # BLD AUTO: 3.19 MILLION/UL (ref 3.88–5.62)
SODIUM SERPL-SCNC: 138 MMOL/L (ref 136–145)
WBC # BLD AUTO: 6.07 THOUSAND/UL (ref 4.31–10.16)

## 2019-11-02 PROCEDURE — 82948 REAGENT STRIP/BLOOD GLUCOSE: CPT

## 2019-11-02 PROCEDURE — 94760 N-INVAS EAR/PLS OXIMETRY 1: CPT

## 2019-11-02 PROCEDURE — 99232 SBSQ HOSP IP/OBS MODERATE 35: CPT | Performed by: INTERNAL MEDICINE

## 2019-11-02 PROCEDURE — 80048 BASIC METABOLIC PNL TOTAL CA: CPT | Performed by: HOSPITALIST

## 2019-11-02 PROCEDURE — 85025 COMPLETE CBC W/AUTO DIFF WBC: CPT | Performed by: HOSPITALIST

## 2019-11-02 PROCEDURE — 85610 PROTHROMBIN TIME: CPT | Performed by: HOSPITALIST

## 2019-11-02 PROCEDURE — 94664 DEMO&/EVAL PT USE INHALER: CPT

## 2019-11-02 RX ADMIN — ACYCLOVIR 400 MG: 800 TABLET ORAL at 08:09

## 2019-11-02 RX ADMIN — METOPROLOL TARTRATE 25 MG: 25 TABLET ORAL at 08:11

## 2019-11-02 RX ADMIN — VANCOMYCIN HYDROCHLORIDE 1000 MG: 1 INJECTION, SOLUTION INTRAVENOUS at 21:40

## 2019-11-02 RX ADMIN — METOPROLOL TARTRATE 25 MG: 25 TABLET ORAL at 21:41

## 2019-11-02 RX ADMIN — INSULIN LISPRO 30 UNITS: 100 INJECTION, SOLUTION INTRAVENOUS; SUBCUTANEOUS at 11:50

## 2019-11-02 RX ADMIN — FUROSEMIDE 40 MG: 10 INJECTION, SOLUTION INTRAMUSCULAR; INTRAVENOUS at 06:10

## 2019-11-02 RX ADMIN — FLUTICASONE FUROATE AND VILANTEROL TRIFENATATE 1 PUFF: 200; 25 POWDER RESPIRATORY (INHALATION) at 09:07

## 2019-11-02 RX ADMIN — CEFEPIME HYDROCHLORIDE 1000 MG: 1 INJECTION, SOLUTION INTRAVENOUS at 05:55

## 2019-11-02 RX ADMIN — VANCOMYCIN HYDROCHLORIDE 1000 MG: 1 INJECTION, SOLUTION INTRAVENOUS at 10:39

## 2019-11-02 RX ADMIN — INSULIN GLARGINE 18 UNITS: 100 INJECTION, SOLUTION SUBCUTANEOUS at 21:40

## 2019-11-02 RX ADMIN — ALLOPURINOL 300 MG: 300 TABLET ORAL at 08:09

## 2019-11-02 RX ADMIN — INSULIN LISPRO 30 UNITS: 100 INJECTION, SOLUTION INTRAVENOUS; SUBCUTANEOUS at 08:11

## 2019-11-02 RX ADMIN — FUROSEMIDE 40 MG: 10 INJECTION, SOLUTION INTRAMUSCULAR; INTRAVENOUS at 17:13

## 2019-11-02 RX ADMIN — ALBUTEROL SULFATE 2 PUFF: 90 AEROSOL, METERED RESPIRATORY (INHALATION) at 09:07

## 2019-11-02 RX ADMIN — ATORVASTATIN CALCIUM 20 MG: 20 TABLET, FILM COATED ORAL at 08:10

## 2019-11-02 RX ADMIN — CEFEPIME HYDROCHLORIDE 1000 MG: 1 INJECTION, SOLUTION INTRAVENOUS at 17:13

## 2019-11-02 RX ADMIN — FUROSEMIDE 40 MG: 10 INJECTION, SOLUTION INTRAMUSCULAR; INTRAVENOUS at 11:51

## 2019-11-02 RX ADMIN — WARFARIN SODIUM 7.5 MG: 7.5 TABLET ORAL at 17:14

## 2019-11-02 NOTE — ASSESSMENT & PLAN NOTE
-incidental finding of psoas muscle mass noted on ERCP  To have CT with contrast when creatinine stable  Will continue to trend Cr and check CT when benefit>risk    Patient aware as noted by Dr Vicky Saldivar on progress note dated 11/01/2019

## 2019-11-02 NOTE — ASSESSMENT & PLAN NOTE
Wt Readings from Last 3 Encounters:   11/02/19 (!) 150 kg (330 lb 4 oz)   10/20/19 (!) 159 kg (350 lb 8 5 oz)   10/15/19 (!) 159 kg (351 lb 6 6 oz)       · Prior echo with an EF of 70% grade 3 diastolic dysfunction  · proBNP 2827 on admisson  · Currently on 40 mg IV Lasix 3 times daily  · Weight on admission 151 kg, now 150kg  · Reduce Lasix dosing to b i d   · Daily weight  · Net -2 3 L in the last 24 hours  · Strict I&O  · Goal net neg 1L/24hrs

## 2019-11-02 NOTE — PLAN OF CARE
Problem: DISCHARGE PLANNING - CARE MANAGEMENT  Goal: Discharge to post-acute care or home with appropriate resources  Description  INTERVENTIONS:  - Conduct assessment to determine patient/family and health care team treatment goals, and need for post-acute services based on payer coverage, community resources, and patient preferences, and barriers to discharge  - Address psychosocial, clinical, and financial barriers to discharge as identified in assessment in conjunction with the patient/family and health care team  - Arrange appropriate level of post-acute services according to patient's   needs and preference and payer coverage in collaboration with the physician and health care team  - Communicate with and update the patient/family, physician, and health care team regarding progress on the discharge plan  - Arrange appropriate transportation to post-acute venues   Outcome: Progressing     Problem: Nutrition/Hydration-ADULT  Goal: Nutrient/Hydration intake appropriate for improving, restoring or maintaining nutritional needs  Description  Monitor and assess patient's nutrition/hydration status for malnutrition  Collaborate with interdisciplinary team and initiate plan and interventions as ordered  Monitor patient's weight and dietary intake as ordered or per policy  Utilize nutrition screening tool and intervene as necessary  Determine patient's food preferences and provide high-protein, high-caloric foods as appropriate       INTERVENTIONS:  - Monitor oral intake, urinary output, labs, and treatment plans  - Assess nutrition and hydration status and recommend course of action  - Evaluate amount of meals eaten  - Assist patient with eating if necessary   - Allow adequate time for meals  - Recommend/ encourage appropriate diets, oral nutritional supplements, and vitamin/mineral supplements  - Order, calculate, and assess calorie counts as needed  - Recommend, monitor, and adjust tube feedings and TPN/PPN based on assessed needs  - Assess need for intravenous fluids  - Provide specific nutrition/hydration education as appropriate  - Include patient/family/caregiver in decisions related to nutrition  Outcome: Progressing     Problem: PAIN - ADULT  Goal: Verbalizes/displays adequate comfort level or baseline comfort level  Description  Interventions:  - Encourage patient to monitor pain and request assistance  - Assess pain using appropriate pain scale  - Administer analgesics based on type and severity of pain and evaluate response  - Implement non-pharmacological measures as appropriate and evaluate response  - Consider cultural and social influences on pain and pain management  - Notify physician/advanced practitioner if interventions unsuccessful or patient reports new pain  Outcome: Progressing     Problem: INFECTION - ADULT  Goal: Absence or prevention of progression during hospitalization  Description  INTERVENTIONS:  - Assess and monitor for signs and symptoms of infection  - Monitor lab/diagnostic results  - Monitor all insertion sites, i e  indwelling lines, tubes, and drains  - Monitor endotracheal if appropriate and nasal secretions for changes in amount and color  - [place] appropriate cooling/warming therapies per order  - Administer medications as ordered  - Instruct and encourage patient and family to use good hand hygiene technique  - Identify and instruct in appropriate isolation precautions for identified infection/condition  Outcome: Progressing     Problem: SAFETY ADULT  Goal: Patient will remain free of falls  Description  INTERVENTIONS:  - Assess patient frequently for physical needs  -  Identify cognitive and physical deficits and behaviors that affect risk of falls    -  [place] fall precautions as indicated by assessment   - Educate patient/family on patient safety including physical limitations  - Instruct patient to call for assistance with activity based on assessment  - Modify environment to reduce risk of injury  - Consider OT/PT consult to assist with strengthening/mobility  Outcome: Progressing  Goal: Maintain or return to baseline ADL function  Description  INTERVENTIONS:  -  Assess patient's ability to carry out ADLs; assess patient's baseline for ADL function and identify physical deficits which impact ability to perform ADLs (bathing, care of mouth/teeth, toileting, grooming, dressing, etc )  - Assess/evaluate cause of self-care deficits   - Assess range of motion  - Assess patient's mobility; develop plan if impaired  - Assess patient's need for assistive devices and provide as appropriate  - Encourage maximum independence but intervene and supervise when necessary  - Involve family in performance of ADLs  - Assess for home care needs following discharge   - Consider OT consult to assist with ADL evaluation and planning for discharge  - Provide patient education as appropriate  Outcome: Progressing  Goal: Maintain or return mobility status to optimal level  Description  INTERVENTIONS:  - Assess patient's baseline mobility status (ambulation, transfers, stairs, etc )    - Identify cognitive and physical deficits and behaviors that affect mobility  - Identify mobility aids required to assist with transfers and/or ambulation (gait belt, sit-to-stand, lift, walker, cane, etc )  - [place] fall precautions as indicated by assessment  - Record patient progress and toleration of activity level on Mobility SBAR; progress patient to next Phase/Stage  - Instruct patient to call for assistance with activity based on assessment  - Consider rehabilitation consult to assist with strengthening/weightbearing, etc   Outcome: Progressing     Problem: DISCHARGE PLANNING  Goal: Discharge to home or other facility with appropriate resources  Description  INTERVENTIONS:  - Identify barriers to discharge w/patient and caregiver  - Arrange for needed discharge resources and transportation as appropriate  - Identify discharge learning needs (meds, wound care, etc )  - Arrange for interpretive services to assist at discharge as needed  - Refer to Case Management Department for coordinating discharge planning if the patient needs post-hospital services based on physician/advanced practitioner order or complex needs related to functional status, cognitive ability, or social support system  Outcome: Progressing     Problem: Knowledge Deficit  Goal: Patient/family/caregiver demonstrates understanding of disease process, treatment plan, medications, and discharge instructions  Description  Complete learning assessment and assess knowledge base  Interventions:  - Provide teaching at level of understanding  - Provide teaching via preferred learning methods  Outcome: Progressing     Problem: Prexisting or High Potential for Compromised Skin Integrity  Goal: Skin integrity is maintained or improved  Description  INTERVENTIONS:  - Identify patients at risk for skin breakdown  - Assess and monitor skin integrity  - Assess and monitor nutrition and hydration status  - Monitor labs   - Assess for incontinence   - Turn and reposition patient  - Assist with mobility/ambulation  - Relieve pressure over bony prominences  - Avoid friction and shearing  - Provide appropriate hygiene as needed including keeping skin clean and dry  - Evaluate need for skin moisturizer/barrier cream  - Collaborate with interdisciplinary team   - Patient/family teaching  - Consider wound care consult   Outcome: Progressing     Problem: Potential for Falls  Goal: Patient will remain free of falls  Description  INTERVENTIONS:  - Assess patient frequently for physical needs  -  Identify cognitive and physical deficits and behaviors that affect risk of falls    -  [place] fall precautions as indicated by assessment   - Educate patient/family on patient safety including physical limitations  - Instruct patient to call for assistance with activity based on assessment  - Modify environment to reduce risk of injury  - Consider OT/PT consult to assist with strengthening/mobility  Outcome: Progressing

## 2019-11-02 NOTE — PROGRESS NOTES
Vancomycin IV Pharmacy-to-Dose Consultation    Orlando Tinsley is a 81 y o  male who is currently receiving Vancomycin IV with management by the Pharmacy Consult service  Assessment/Plan:  The patient was reviewed  Renal function is stable and no signs or symptoms of nephrotoxicity and/or infusion reactions were documented in the chart  Based on todays assessment, continue current vancomycin (day # 5) dosing of 1000mg q12h , with a plan for trough to be drawn at 1000 on 11/3/2019  We will continue to follow the patients culture results and clinical progress daily      Venu Baez, Pharmacist

## 2019-11-02 NOTE — ASSESSMENT & PLAN NOTE
-DDx includes R-sided CHF, SE of Velcade  -MRCP: Examination significantly limited by body habitus  Teryl Block is no gross choledocholithiasis   CBD normal in diameter  Cholelithiasis with filling defects noted in the gallbladder neck   No surrounding inflammation  Cardiomegaly with small bilateral pleural effusions  Partially imaged masslike lesion anterior to the left psoas muscle as seen on images 5/16 and 1402/95 measuring approximately 4 5 x 2 5 cm  This could represent adenopathy or other mass   CT scan of the abdomen and pelvis with contrast recommended  -RUQ U/S: Evidence of gallstone with borderline wall thickening  The common duct is normal in caliber but small stone within the duct is possible versus adjacent fat  Direct biliary imaging would be useful for further assessment especially given transaminitis        Plan  · Trend CMP - Trend ALP and T luciana  · Follow GGT results  · No acute intervention given unremarkable imaging fidnigns  · GI following

## 2019-11-02 NOTE — PROGRESS NOTES
Progress Note - Fant[place] 1950, 81 y o  male MRN: [id number]    Unit/Bed#: [address] [place] [address] Encounter: [id number]    Primary Care Provider: Kp Estrella MD   Date and time admitted to hospital: 10/29/2019  1:43 PM        * Cellulitis  Assessment & Plan  · Patient presented with worsening right lower extremity swelling with small blister on dorsal part of his right foot  · Continue cefepime/vancomycin given prior wound cultures positive for MRSA and Proteus  · X-ray R foot: No radiographic evidence of osteomyelitis or soft tissue air  · Procalcitonin 0 17  · Blood cultures NGTD  · Infectious Disease on board, agree with cefepime and vancomycin    DORA (acute kidney injury) ([facility] [address] )  Assessment & Plan  -trend Cr    Mass of psoas muscle  Assessment & Plan  -incidental finding of psoas muscle mass noted on ERCP  To have CT with contrast when creatinine stable  Will continue to trend Cr and check CT when benefit>risk  Patient aware as noted by Dr Flaquita Shore on progress note dated 11/01/2019    Elevated troponin  Assessment & Plan  · EKG with AFib no acute ST changes  · troponins indeterminate (non-MI trop elevation)     Transaminitis  Assessment & Plan  -DDx includes R-sided CHF, SE of Velcade  -MRCP: Examination significantly limited by body habitus  Carlo Salon is no gross choledocholithiasis   CBD normal in diameter  Cholelithiasis with filling defects noted in the gallbladder neck   No surrounding inflammation  Cardiomegaly with small bilateral pleural effusions  Partially imaged masslike lesion anterior to the left psoas muscle as seen on images 5/16 and 1402/95 measuring approximately 4 5 x 2 5 cm  This could represent adenopathy or other mass   CT scan of the abdomen and pelvis with contrast recommended  -RUQ U/S: Evidence of gallstone with borderline wall thickening  The common duct is normal in caliber but small stone within the duct is possible versus adjacent fat    Direct biliary imaging would be useful for further assessment especially given transaminitis        Plan  · Trend CMP - Trend ALP and T luciana  · Follow GGT results  · No acute intervention given unremarkable imaging fidnigns  · GI following    Weakness  Assessment & Plan  · PT/OT     Multiple myeloma not having achieved remission ([facility] [address] )  Assessment & Plan  · Recently diagnosed 09/2019  · Patient was scheduled for Velcade on day of admission however due to weakness presented to the emergency room  · Outpatient Oncology follow-up    Moderate persistent asthma without complication  Assessment & Plan  · Without acute exacerbation  · Continue home Advair    SOB (shortness of breath)  Assessment & Plan  · Shortness of breath on exertion, progressively worsening prior to admission, multifactorial, secondary to acute CHF, morbid obesity and ANLLELY  · CTA negative for PE  · lower extremity edema improving  · Remains on intravenous Lasix  · Saturations 97% on room air, keep O2 saturation greater than 92%    Hyperlipidemia  Assessment & Plan  · Continue home statin    Acute on chronic diastolic congestive heart failure (HCC)  Assessment & Plan  Wt Readings from Last 3 Encounters:   11/02/19 (!) 150 kg (330 lb 4 oz)   10/20/19 (!) 159 kg (350 lb 8 5 oz)   10/15/19 (!) 159 kg (351 lb 6 6 oz)       · Prior echo with an EF of 78% grade 3 diastolic dysfunction  · proBNP 2827 on admisson  · Currently on 40 mg IV Lasix 3 times daily  · Weight on admission 151 kg, now 150kg  · Reduce Lasix dosing to b i d   · Daily weight  · Net -2 3 L in the last 24 hours  · Strict I&O  · Goal net neg 1L/24hrs        [facility]  Assessment & Plan  · Dietary education and counseling      Chronic atrial fibrillation  Assessment & Plan  · Continue home beta-blocker/Coumadin  · Follow INR    Diabetes mellitus type 2, uncontrolled [facility])  Assessment & Plan  Lab Results   Component Value Date    HGBA1C 9 4 (H) 12/27/2018       Recent Labs     11/01/19  1639 11/01/19  2118 11/01/19  2303 19  0702   POCGLU 64* 56* 118 123       Blood Sugar Average: Last 72 hrs:  · (P) 924 [id number]   · continue Lantus/sliding-scale insulin  · Hold home metformin        VTE Pharmacologic Prophylaxis:   Pharmacologic: Warfarin (Coumadin)  Mechanical VTE Prophylaxis in Place: Yes    Patient Centered Rounds: I have performed bedside rounds with nursing staff today  Discussions with Specialists or Other Care Team Provider:  Discussed with infectious disease, Dr Shaun Loyd    Education and Discussions with Family / Patient:  Discussed with patient    Time Spent for Care: 30 minutes  More than 50% of total time spent on counseling and coordination of care as described above  Current Length of Stay: 4 day(s)    Current Patient Status: Inpatient   Certification Statement: The patient will continue to require additional inpatient hospital stay due to Right lower extremity cellulitis    Discharge Plan:  Tomorrow    Code Status: Level 1 - Full Code      Subjective:   No overnight events    Objective:     Vitals:   Temp (24hrs), Av 5 °F (36 9 °C), Min:98 3 °F (36 8 °C), Max:98 6 °F (37 °C)    Temp:  [98 3 °F (36 8 °C)-98 6 °F (37 °C)] 98 3 °F (36 8 °C)  HR:  [56-66] 56  Resp:  [18] 18  BP: (142-144)/(64-66) 142/66  SpO2:  [91 %-92 %] 91 %  Body mass index is 41 28 kg/m²  Input and Output Summary (last 24 hours): Intake/Output Summary (Last 24 hours) at 2019 1054  Last data filed at 2019 0147  Gross per 24 hour   Intake 240 ml   Output 2200 ml   Net -1960 ml       Physical Exam:     Physical Exam   Constitutional: He is oriented to person, place, and time  He appears well-developed  Neck: Neck supple  Cardiovascular: Normal rate and regular rhythm  No murmur heard  Pulmonary/Chest: Effort normal and breath sounds normal  No respiratory distress  Musculoskeletal: He exhibits edema  Neurological: He is alert and oriented to person, place, and time  No cranial nerve deficit     Skin: There is erythema  Additional Data:     Labs:    Results from last 7 days   Lab Units 11/02/19  0844   WBC Thousand/uL 6 07   HEMOGLOBIN g/dL 8 8*   HEMATOCRIT % 30 1*   PLATELETS Thousands/uL 335   NEUTROS PCT % 73   LYMPHS PCT % 13*   MONOS PCT % 8   EOS PCT % 4     Results from last 7 days   Lab Units 11/02/19  0528 11/01/19  0530   SODIUM mmol/L 138 139   POTASSIUM mmol/L 3 8 3 6   CHLORIDE mmol/L 102 103   CO2 mmol/L 26 27   BUN mg/dL 35* 36*   CREATININE mg/dL 1 24 1 38*   ANION GAP mmol/L 10 9   CALCIUM mg/dL 8 5 8 4   ALBUMIN g/dL  --  2 3*   TOTAL BILIRUBIN mg/dL  --  1 30*   ALK PHOS U/L  --  349*   ALT U/L  --  39   AST U/L  --  24   GLUCOSE RANDOM mg/dL 102 129     Results from last 7 days   Lab Units 11/02/19  0844   INR  2 11*     Results from last 7 days   Lab Units 11/02/19  0702 11/01/19  2303 11/01/19  2118 11/01/19  1639 11/01/19  1108 11/01/19  0709 10/31/19  2115 10/31/19  1809 10/31/19  1504 10/31/19  1131 10/31/19  0623 10/30/19  2050   POC GLUCOSE mg/dl 123 118 56* 64* 130 135 131 74 140 95 114 96         Results from last 7 days   Lab Units 10/29/19  2135 10/29/19  1424   LACTIC ACID mmol/L  --  1 8   PROCALCITONIN ng/ml 0 17  --            * I Have Reviewed All Lab Data Listed Above  * Additional Pertinent Lab Tests Reviewed: Caesar 66 Admission Reviewed      Recent Cultures (last 7 days):     Results from last 7 days   Lab Units 10/29/19  1424   BLOOD CULTURE  No Growth at 72 hrs  No Growth at 72 hrs         Last 24 Hours Medication List:     Current Facility-Administered Medications:  acetaminophen 650 mg Oral Q6H PRN NICOLA Black    acyclovir 400 mg Oral Daily NICOLA Pfeiffer    albuterol 2 puff Inhalation Q6H PRN NICOLA Black    allopurinol 300 mg Oral Daily NICOLA Pfeiffer    atorvastatin 20 mg Oral Daily NICOLA Pfeiffer    bisacodyl 10 mg Rectal PRN NICOLA Black    cefepime 1,000 mg Intravenous Q12H NICOLA Montoya Last Rate: 1,000 mg (11/02/19 0555)   fluticasone-vilanterol 1 puff Inhalation Daily NICOLA Pfeiffer    furosemide 40 mg Intravenous TID (diuretic) NICOLA Montoya    insulin glargine 18 Units Subcutaneous HS NICOLA Pfeiffer    insulin lispro 1-6 Units Subcutaneous HS NICOLA Pfeiffer    insulin lispro 2-12 Units Subcutaneous TID AC NICOLA Pfeiffer    insulin lispro 30 Units Subcutaneous TID With Meals NICOLA Montoya    metoprolol tartrate 25 mg Oral Q12H NICOLA Pfeiffer    vancomycin 1,000 mg Intravenous Q12H Murali Hammonds MD Last Rate: 1,000 mg (11/02/19 1039)   warfarin 7 5 mg Oral Daily (warfarin) NICOLA Montoya         Today, Patient Was Seen By: Skylar Rowland MD    ** Please Note: Dictation voice to text software may have been used in the creation of this document   **

## 2019-11-02 NOTE — ASSESSMENT & PLAN NOTE
Lab Results   Component Value Date    HGBA1C 9 4 (H) 12/27/2018       Recent Labs     11/01/19  1639 11/01/19  2118 11/01/19  2303 11/02/19  0702   POCGLU 64* 56* 118 123       Blood Sugar Average: Last 72 hrs:  · (P) 144 [id number]   · continue Lantus/sliding-scale insulin  · Hold home metformin

## 2019-11-02 NOTE — PROGRESS NOTES
Vancomycin Assessment    Bethanie Rios is a 81 y o  male who is currently receiving vancomycin 1500mg IV Q12H for skin-soft tissue infection soft tissue infection   Relevant clinical data and objective history reviewed:  Creatinine   Date Value Ref Range Status   11/01/2019 1 38 (H) 0 60 - 1 30 mg/dL Final     Comment:     Standardized to IDMS reference method   10/31/2019 1 48 (H) 0 60 - 1 30 mg/dL Final     Comment:     Standardized to IDMS reference method   10/30/2019 1 21 0 60 - 1 30 mg/dL Final     Comment:     Standardized to IDMS reference method   01/15/2018 0 99 0 70 - 1 25 mg/dL Final     Comment:     Result Comment: For patients >52years of age, the reference limit  for Creatinine is approximately 13% higher for people  identified as -American      05/25/2017 1 31 (H) 0 70 - 1 25 mg/dL Final     Comment:     Result Comment: For patients >52years of age, the reference limit  for Creatinine is approximately 13% higher for people  identified as -American      12/09/2016 1 18 0 70 - 1 25 mg/dL Final     Comment:     Result Comment: For patients >52years of age, the reference limit  for Creatinine is approximately 13% higher for people  identified as -American  Vancomycin Rm   Date Value Ref Range Status   08/02/2019 19 4 ug/mL Final     /64 (BP Location: Right arm)   Pulse 65   Temp 98 6 °F (37 °C) (Oral)   Resp 18   Ht 6' 3" (1 905 m)   Wt (!) 149 kg (327 lb 9 7 oz) Comment: 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  SpO2 92%   BMI 40 95 kg/m²   I/O last 3 completed shifts:   In: 600 [P O :600]  Out: 1577 [Urine:4350]  Lab Results   Component Value Date/Time    BUN 36 (H) 11/01/2019 05:30 AM    BUN 27 (H) 01/23/2018 02:29 PM    WBC 6 11 11/01/2019 05:30 AM    WBC 5 16 12/23/2015 06:00 AM    HGB 8 4 (L) 11/01/2019 05:30 AM    HGB 10 6 (L) 12/23/2015 06:00 AM    HCT 27 9 (L) 11/01/2019 05:30 AM    HCT 34 5 (L) 12/23/2015 06:00 AM    MCV 93 11/01/2019 05:30 AM    MCV 91 12/23/2015 06:00 AM     11/01/2019 05:30 AM     12/23/2015 06:00 AM     Temp Readings from Last 3 Encounters:   11/01/19 98 6 °F (37 °C) (Oral)   10/20/19 97 5 °F (36 4 °C) (Oral)   10/15/19 99 8 °F (37 7 °C) (Temporal)     Vancomycin Days of Therapy: 4    Assessment/Plan  The patient is currently on vancomycin utilizing scheduled dosing  Baseline risks associated with therapy include: advanced age  The patient is receiving 1500mg IV Q12H with the most recent vancomycin level being at steady-state and supratherapeutic  (25 2) based on a goal of 15-20 (appropriate for most indications) ; therefore, after clinical evaluation will be changed to 1000mg IV Q12H    Pharmacy will continue to follow closely for s/sx of nephrotoxicity, infusion reactions and appropriateness of therapy  BMP and CBC will be ordered per protocol  Plan for trough as patient approaches steady state, prior to the 4th  dose at approximately 1000 on 11/03/2019  Pharmacy will continue to follow the patients culture results and clinical progress daily      Jinny Guzmán, Pharmacist

## 2019-11-02 NOTE — ASSESSMENT & PLAN NOTE
· Patient presented with worsening right lower extremity swelling with small blister on dorsal part of his right foot  · Continue cefepime/vancomycin given prior wound cultures positive for MRSA and Proteus  · X-ray R foot: No radiographic evidence of osteomyelitis or soft tissue air    · Procalcitonin 0 17  · Blood cultures NGTD  · Infectious Disease on board, agree with cefepime and vancomycin

## 2019-11-02 NOTE — ASSESSMENT & PLAN NOTE
· Shortness of breath on exertion, progressively worsening prior to admission, multifactorial, secondary to acute CHF, morbid obesity and NALLELY  · CTA negative for PE  · lower extremity edema improving  · Remains on intravenous Lasix  · Saturations 97% on room air, keep O2 saturation greater than 92%

## 2019-11-03 LAB
ALBUMIN SERPL BCP-MCNC: 2.4 G/DL (ref 3.5–5)
ALP SERPL-CCNC: 331 U/L (ref 46–116)
ALT SERPL W P-5'-P-CCNC: 32 U/L (ref 12–78)
ANION GAP SERPL CALCULATED.3IONS-SCNC: 7 MMOL/L (ref 4–13)
AST SERPL W P-5'-P-CCNC: 24 U/L (ref 5–45)
BACTERIA BLD CULT: NORMAL
BACTERIA BLD CULT: NORMAL
BASOPHILS # BLD AUTO: 0.07 THOUSANDS/ΜL (ref 0–0.1)
BASOPHILS NFR BLD AUTO: 1 % (ref 0–1)
BILIRUB SERPL-MCNC: 1.1 MG/DL (ref 0.2–1)
BUN SERPL-MCNC: 37 MG/DL (ref 5–25)
CALCIUM SERPL-MCNC: 8.1 MG/DL (ref 8.3–10.1)
CHLORIDE SERPL-SCNC: 102 MMOL/L (ref 100–108)
CO2 SERPL-SCNC: 29 MMOL/L (ref 21–32)
CREAT SERPL-MCNC: 1.3 MG/DL (ref 0.6–1.3)
EOSINOPHIL # BLD AUTO: 0.24 THOUSAND/ΜL (ref 0–0.61)
EOSINOPHIL NFR BLD AUTO: 4 % (ref 0–6)
ERYTHROCYTE [DISTWIDTH] IN BLOOD BY AUTOMATED COUNT: 16.4 % (ref 11.6–15.1)
GFR SERPL CREATININE-BSD FRML MDRD: 56 ML/MIN/1.73SQ M
GGT SERPL-CCNC: 165 U/L (ref 5–85)
GLUCOSE SERPL-MCNC: 134 MG/DL (ref 65–140)
GLUCOSE SERPL-MCNC: 135 MG/DL (ref 65–140)
GLUCOSE SERPL-MCNC: 139 MG/DL (ref 65–140)
GLUCOSE SERPL-MCNC: 149 MG/DL (ref 65–140)
GLUCOSE SERPL-MCNC: 174 MG/DL (ref 65–140)
GLUCOSE SERPL-MCNC: 76 MG/DL (ref 65–140)
HCT VFR BLD AUTO: 31.4 % (ref 36.5–49.3)
HGB BLD-MCNC: 9.1 G/DL (ref 12–17)
IMM GRANULOCYTES # BLD AUTO: 0.14 THOUSAND/UL (ref 0–0.2)
IMM GRANULOCYTES NFR BLD AUTO: 2 % (ref 0–2)
INR PPP: 2.2 (ref 0.84–1.19)
LYMPHOCYTES # BLD AUTO: 0.93 THOUSANDS/ΜL (ref 0.6–4.47)
LYMPHOCYTES NFR BLD AUTO: 14 % (ref 14–44)
MCH RBC QN AUTO: 27.6 PG (ref 26.8–34.3)
MCHC RBC AUTO-ENTMCNC: 29 G/DL (ref 31.4–37.4)
MCV RBC AUTO: 95 FL (ref 82–98)
MONOCYTES # BLD AUTO: 0.59 THOUSAND/ΜL (ref 0.17–1.22)
MONOCYTES NFR BLD AUTO: 9 % (ref 4–12)
NEUTROPHILS # BLD AUTO: 4.47 THOUSANDS/ΜL (ref 1.85–7.62)
NEUTS SEG NFR BLD AUTO: 70 % (ref 43–75)
NRBC BLD AUTO-RTO: 1 /100 WBCS
PLATELET # BLD AUTO: 360 THOUSANDS/UL (ref 149–390)
PMV BLD AUTO: 9.7 FL (ref 8.9–12.7)
POTASSIUM SERPL-SCNC: 3.9 MMOL/L (ref 3.5–5.3)
PROT SERPL-MCNC: 6.7 G/DL (ref 6.4–8.2)
PROTHROMBIN TIME: 24.1 SECONDS (ref 11.6–14.5)
RBC # BLD AUTO: 3.3 MILLION/UL (ref 3.88–5.62)
SODIUM SERPL-SCNC: 138 MMOL/L (ref 136–145)
VANCOMYCIN TROUGH SERPL-MCNC: 25.5 UG/ML (ref 10–20)
WBC # BLD AUTO: 6.44 THOUSAND/UL (ref 4.31–10.16)

## 2019-11-03 PROCEDURE — 85610 PROTHROMBIN TIME: CPT | Performed by: HOSPITALIST

## 2019-11-03 PROCEDURE — 85025 COMPLETE CBC W/AUTO DIFF WBC: CPT | Performed by: HOSPITALIST

## 2019-11-03 PROCEDURE — 82948 REAGENT STRIP/BLOOD GLUCOSE: CPT

## 2019-11-03 PROCEDURE — 94760 N-INVAS EAR/PLS OXIMETRY 1: CPT

## 2019-11-03 PROCEDURE — 80202 ASSAY OF VANCOMYCIN: CPT | Performed by: HOSPITALIST

## 2019-11-03 PROCEDURE — 82977 ASSAY OF GGT: CPT | Performed by: INTERNAL MEDICINE

## 2019-11-03 PROCEDURE — 80053 COMPREHEN METABOLIC PANEL: CPT | Performed by: INTERNAL MEDICINE

## 2019-11-03 PROCEDURE — 94640 AIRWAY INHALATION TREATMENT: CPT

## 2019-11-03 PROCEDURE — 99232 SBSQ HOSP IP/OBS MODERATE 35: CPT | Performed by: INTERNAL MEDICINE

## 2019-11-03 RX ADMIN — ATORVASTATIN CALCIUM 20 MG: 20 TABLET, FILM COATED ORAL at 09:31

## 2019-11-03 RX ADMIN — VANCOMYCIN HYDROCHLORIDE 750 MG: 750 INJECTION, SOLUTION INTRAVENOUS at 22:09

## 2019-11-03 RX ADMIN — ACYCLOVIR 400 MG: 800 TABLET ORAL at 09:32

## 2019-11-03 RX ADMIN — VANCOMYCIN HYDROCHLORIDE 1000 MG: 1 INJECTION, SOLUTION INTRAVENOUS at 09:43

## 2019-11-03 RX ADMIN — INSULIN GLARGINE 18 UNITS: 100 INJECTION, SOLUTION SUBCUTANEOUS at 22:04

## 2019-11-03 RX ADMIN — FUROSEMIDE 40 MG: 10 INJECTION, SOLUTION INTRAMUSCULAR; INTRAVENOUS at 11:48

## 2019-11-03 RX ADMIN — METOPROLOL TARTRATE 25 MG: 25 TABLET ORAL at 09:31

## 2019-11-03 RX ADMIN — ALLOPURINOL 300 MG: 300 TABLET ORAL at 09:31

## 2019-11-03 RX ADMIN — WARFARIN SODIUM 7.5 MG: 7.5 TABLET ORAL at 18:50

## 2019-11-03 RX ADMIN — METOPROLOL TARTRATE 25 MG: 25 TABLET ORAL at 22:04

## 2019-11-03 RX ADMIN — CEFEPIME HYDROCHLORIDE 1000 MG: 1 INJECTION, SOLUTION INTRAVENOUS at 16:13

## 2019-11-03 RX ADMIN — FLUTICASONE FUROATE AND VILANTEROL TRIFENATATE 1 PUFF: 200; 25 POWDER RESPIRATORY (INHALATION) at 07:37

## 2019-11-03 RX ADMIN — INSULIN LISPRO 30 UNITS: 100 INJECTION, SOLUTION INTRAVENOUS; SUBCUTANEOUS at 08:10

## 2019-11-03 RX ADMIN — FUROSEMIDE 40 MG: 10 INJECTION, SOLUTION INTRAMUSCULAR; INTRAVENOUS at 18:52

## 2019-11-03 RX ADMIN — FUROSEMIDE 40 MG: 10 INJECTION, SOLUTION INTRAMUSCULAR; INTRAVENOUS at 06:30

## 2019-11-03 RX ADMIN — INSULIN LISPRO 30 UNITS: 100 INJECTION, SOLUTION INTRAVENOUS; SUBCUTANEOUS at 11:48

## 2019-11-03 RX ADMIN — CEFEPIME HYDROCHLORIDE 1000 MG: 1 INJECTION, SOLUTION INTRAVENOUS at 03:35

## 2019-11-03 RX ADMIN — ALBUTEROL SULFATE 2 PUFF: 90 AEROSOL, METERED RESPIRATORY (INHALATION) at 07:38

## 2019-11-03 NOTE — PROGRESS NOTES
[address] Internal Medicine Progress Note  Patient: Kalyan Negrete 81 y o  male   MRN: [id number]  PCP: Killian Mata MD  Unit/Bed#: 2 [address] Encounter: [id number]  Date Of Visit: 11/03/19    Assessment:    Principal Problem:    Cellulitis  Active Problems:    Diabetes mellitus type 2, uncontrolled ([place] [address] )    Chronic atrial fibrillation    Morbid obesity ([place] [address] )    Acute on chronic diastolic congestive heart failure (HCC)    Hyperlipidemia    SOB (shortness of breath)    Moderate persistent asthma without complication    Multiple myeloma not having achieved remission (HCC)    Weakness    Transaminitis    Elevated troponin    Mass of psoas muscle    DORA (acute kidney injury) ([place] [address] )      Plan:    * Cellulitis  Assessment & Plan  · Patient presented with worsening right lower extremity swelling with small blister on dorsal part of his right foot  · Continue cefepime/vancomycin given prior wound cultures positive for MRSA and Proteus  · X-ray R foot: No radiographic evidence of osteomyelitis or soft tissue air  · Procalcitonin 0 17  · Blood cultures NGTD  · Infectious Disease on board, agree with cefepime and vancomycin     DORA (acute kidney injury) ([place] [address] )  Assessment & Plan  -trend Cr     Mass of psoas muscle  Assessment & Plan  -incidental finding of psoas muscle mass noted on ERCP  To have CT with contrast when creatinine stable  Will continue to trend Cr and check CT when benefit>risk  Patient aware as noted by Dr Campbell Conroy on progress note dated 11/01/2019     Elevated troponin  Assessment & Plan  · EKG with AFib no acute ST changes  · troponins indeterminate (non-MI trop elevation)      Transaminitis  Assessment & Plan  -DDx includes R-sided CHF, SE of Velcade  -MRCP: Examination significantly limited by body habitus  [facility] is no gross choledocholithiasis   CBD normal in diameter  Cholelithiasis with filling defects noted in the gallbladder neck   No surrounding inflammation   Cardiomegaly with small bilateral pleural effusions  Partially imaged masslike lesion anterior to the left psoas muscle as seen on images 5/16 and 1402/95 measuring approximately 4 5 x 2 5 cm  This could represent adenopathy or other mass   CT scan of the abdomen and pelvis with contrast recommended  -RUQ U/S: Evidence of gallstone with borderline wall thickening  The common duct is normal in caliber but small stone within the duct is possible versus adjacent fat    Direct biliary imaging would be useful for further assessment especially given transaminitis         Plan  · Trend CMP - Trend ALP and T luciana  · Follow GGT results  · No acute intervention given unremarkable imaging fidnigns  · GI following     Weakness  Assessment & Plan  · PT/OT      Multiple myeloma not having achieved remission ([facility] [address] )  Assessment & Plan  · Recently diagnosed 09/2019  · Patient was scheduled for Velcade on day of admission however due to weakness presented to the emergency room  · Outpatient Oncology follow-up     Moderate persistent asthma without complication  Assessment & Plan  · Without acute exacerbation  · Continue home Advair     SOB (shortness of breath)  Assessment & Plan  · Shortness of breath on exertion, progressively worsening prior to admission, multifactorial, secondary to acute CHF, morbid obesity and NALLELY  · CTA negative for PE  · lower extremity edema improving  · Remains on intravenous Lasix  · Saturations 97% on room air, keep O2 saturation greater than 92%     Hyperlipidemia  Assessment & Plan  · Continue home statin     Acute on chronic diastolic congestive heart failure (HCC)  Assessment & Plan      Wt Readings from Last 3 Encounters:   11/02/19 (!) 150 kg (330 lb 4 oz)   10/20/19 (!) 159 kg (350 lb 8 5 oz)   10/15/19 (!) 159 kg (351 lb 6 6 oz)         · Prior echo with an EF of 63% grade 3 diastolic dysfunction  · proBNP 2827 on admisson  · Currently on 40 mg IV Lasix 3 times daily  · Weight on admission 151 kg, now 150kg  · Reduce Lasix dosing to b i d  · Daily weight weight presently 330 lb which is an 11 lb weight loss since arrival  · Net -2 3 L in the last 24 hours and 8 L since arrival  · Strict I&O  · Goal net neg 1L/24hrs  · Can probably change over to his oral torsemide 10 mg b i d  In a m  With continued diuresis check BMP           Morbid obesity ([facility] )  Assessment & Plan  · Dietary education and counseling        Chronic atrial fibrillation  Assessment & Plan  · Continue home beta-blocker/Coumadin  · Follow INR     Diabetes mellitus type 2, uncontrolled ([facility] )  Assessment & Plan        Lab Results   Component Value Date     HGBA1C 9 4 (H) 2018                Recent Labs     19  1639 19  2118 19  2303 19  0702   POCGLU 64* 56* 118 123         Blood Sugar Average: Last 72 hrs:  · (P) 269 [id number]   · continue Lantus/sliding-scale insulin  · Hold home metformin     ·       VTE Pharmacologic Prophylaxis:   Pharmacologic: Warfarin (Coumadin)  Mechanical VTE Prophylaxis in Place: No right lower extremity active wound    Discussions with Specialists or Other Care Team Provider:     Time Spent for Care: 45 minutes  More than 50% of total time spent on counseling and coordination of care as described above  Subjective:   No distress denies pain    Objective:     Vitals:   Temp (24hrs), Av 5 °F (36 9 °C), Min:98 3 °F (36 8 °C), Max:98 6 °F (37 °C)    Temp:  [98 3 °F (36 8 °C)-98 6 °F (37 °C)] 98 6 °F (37 °C)  HR:  [67-99] 68  Resp:  [16-18] 16  BP: (137-170)/(63-79) 144/64  SpO2:  [91 %-96 %] 96 %  Body mass index is 41 28 kg/m²  Input and Output Summary (last 24 hours):        Intake/Output Summary (Last 24 hours) at 11/3/2019 0810  Last data filed at 11/3/2019 0406  Gross per 24 hour   Intake 110 ml   Output 1875 ml   Net -1765 ml       Physical Exam:     Physical Exam:   General appearance: alert, appears stated age and cooperative  Head: Normocephalic, without obvious abnormality, atraumatic  Lungs: clear to auscultation bilaterally  Heart: regular rate and rhythm  Abdomen: Protuberant obese  Back: negative  Extremities: Left above knee amputation, right lower extremity with stasis changes throughout and erythema no active ulceration or weeping  Neurologic: Grossly normal      Additional Data:     Labs:    Results from last 7 days   Lab Units 11/03/19  0502   WBC Thousand/uL 6 44   HEMOGLOBIN g/dL 9 1*   HEMATOCRIT % 31 4*   PLATELETS Thousands/uL 360   NEUTROS PCT % 70   LYMPHS PCT % 14   MONOS PCT % 9   EOS PCT % 4     Results from last 7 days   Lab Units 11/03/19  0503   POTASSIUM mmol/L 3 9   CHLORIDE mmol/L 102   CO2 mmol/L 29   BUN mg/dL 37*   CREATININE mg/dL 1 30   CALCIUM mg/dL 8 1*   ALK PHOS U/L 331*   ALT U/L 32   AST U/L 24     Results from last 7 days   Lab Units 11/03/19  0503   INR  2 20*       * I Have Reviewed All Lab Data Listed Above  * Additional Pertinent Lab Tests Reviewed: All Labs For Current Hospital Admission Reviewed    Imaging:  Xr Chest 2 Views    Result Date: 10/29/2019  Narrative: CHEST INDICATION:   shortness of breath  COMPARISON:  Chest radiograph from August 4, 2019  Lower chest from an abdomen CT from July 10, 2010  EXAM PERFORMED/VIEWS:  XR CHEST AP & LATERAL FINDINGS: Cardiomediastinal silhouette appears unremarkable  Retrocardiac opacity due to to a hiatal hernia  Low lung volumes with bibasilar atelectasis with no definite pneumonia  Osseous structures appear within normal limits for patient age  Impression: Low lung volumes with bibasilar atelectasis with no definite pneumonia  Retrograde cardiac opacity due to hiatal hernia  Workstation performed: [id number]     Xr Hip/pelv 2-3 Vws Left If Performed    Result Date: 10/21/2019  Narrative: LEFT HIP; LEFT FEMUR INDICATION:   Fall, direct trauma to left AKA stump   COMPARISON:  None VIEWS:  XR HIP/PELV 2-3 VWS LEFT  W PELVIS IF PERFORMED, XR FEMUR 2 VW LEFT FINDINGS: The findings are somewhat limited due to film underpenetration and external rotation of the femoral neck  There is no definite acute fracture or dislocation  The patient is status post above-the-knee amputation  Some ossification is noted adjacent to the distal extent of the left femur  No lytic or blastic osseous lesions  Vascular calcification is seen  Degenerative spondylosis of the lumbar spine is seen  Impression: Status post amputation  No definite evidence of fracture  Should symptoms persist follow-up may be helpful  Workstation performed: [id number]     Xr Femur 2 Views Left    Result Date: 10/21/2019  Narrative: LEFT HIP; LEFT FEMUR INDICATION:   Fall, direct trauma to left AKA stump  COMPARISON:  None VIEWS:  XR HIP/PELV 2-3 VWS LEFT  W PELVIS IF PERFORMED, XR FEMUR 2 VW LEFT FINDINGS: The findings are somewhat limited due to film underpenetration and external rotation of the femoral neck  There is no definite acute fracture or dislocation  The patient is status post above-the-knee amputation  Some ossification is noted adjacent to the distal extent of the left femur  No lytic or blastic osseous lesions  Vascular calcification is seen  Degenerative spondylosis of the lumbar spine is seen  Impression: Status post amputation  No definite evidence of fracture  Should symptoms persist follow-up may be helpful  Workstation performed: [id number]     Xr Foot 2 Vw Right    Result Date: 10/30/2019  Narrative: RIGHT FOOT INDICATION: Right foot cellulitis  COMPARISON:  None VIEWS:  XR FOOT 2 VW RIGHT FINDINGS: There is no acute fracture or dislocation  Calcaneal spurring and arthritis of the midfoot noted  No lytic or blastic lesions seen  There is diffuse soft tissue swelling    There is no evidence of soft tissue air or radiopaque from body     Impression: No radiographic evidence of osteomyelitis or soft tissue air Workstation performed: [id number]     Mri Abdomen Wo Contrast And Mrcp    Result Date: 10/31/2019  Narrative: MRI OF THE ABDOMEN WITHOUT CONTRAST WITH MRCP INDICATION: Evaluate for choledocholithiasis  COMPARISON: Ultrasound from yesterday  TECHNIQUE: TECHNIQUE:  MRI of the abdomen was performed without the administration of contrast using the following  using sequences: Axial T1 weighted in/out of phase images, multiplanar T2 weighted images, diffusion weighted and fat suppressed T1 weighted images  3D MRCP images were obtained with radial thick slabs and projections  3D rendering was performed from the acquisition scanner  FINDINGS: LOWER CHEST:   Small bilateral pleural effusions  Cardiomegaly  Basilar atelectasis  Francella Crooked LIVER: Hepatomegaly  No suspicious mass  Simple right lobe cyst measuring 1 5 cm  BILE DUCTS: MRCP images limited by body habitus  No intrahepatic or extrahepatic bile duct dilation  Common bile duct is normal in caliber  No gross choledocholithiasis, biliary stricture or suspicious mass  GALLBLADDER:  Small stones noted towards the neck image 12/2  PANCREAS: Normal  No main pancreatic ductal dilation  ADRENAL GLANDS:  Normal  SPLEEN:  Normal  KIDNEYS/PROXIMAL URETERS: No hydroureteronephrosis  No suspicious renal mass  BOWEL:   Moderate hiatal hernia  No obstruction  PERITONEAL CAVITY/RETROPERITONEUM: No ascites  Partially imaged masslike lesion anterior to the left psoas muscle as seen on image 5/16 measuring approximately 4 5 x 2 5 cm  LYMPH NODES: No abdominal lymphadenopathy  VASCULAR STRUCTURES:  Unremarkable  No aneurysm  ABDOMINAL WALL:  Unremarkable  OSSEOUS STRUCTURES:  No suspicious osseous lesion  Impression: Examination significantly limited by body habitus  There is no gross choledocholithiasis  CBD normal in diameter  Cholelithiasis with filling defects noted in the gallbladder neck  No surrounding inflammation  Simple hepatic cyst in the right lobe measuring 1 5 cm  Moderate hiatal hernia  Cardiomegaly with small bilateral pleural effusions   Partially imaged masslike lesion anterior to the left psoas muscle as seen on images 5/16 and 1402/95 measuring approximately 4 5 x 2 5 cm  This could represent adenopathy or other mass  CT scan of the abdomen and pelvis with contrast recommended  The study was marked in [facility] for immediate notification  Workstation performed: [id number]     Us Right Upper Quadrant    Result Date: 10/30/2019  Narrative: RIGHT UPPER QUADRANT ULTRASOUND INDICATION:     transamintitis  COMPARISON:  Ultrasound from 7/30/2019 and the kidneys  CT from 2/16/2008 TECHNIQUE:   Real-time ultrasound of the right upper quadrant was performed with a curvilinear transducer with both volumetric sweeps and still imaging techniques  FINDINGS: PANCREAS: The pancreas is mostly obscured by bowel gas  AORTA AND IVC: The vessels are mostly obscured by bowel gas  LIVER: Size: The liver measures 25 cm in the midclavicular line  This is enlarged or related to a Riedel's lobe  Contour:  Surface contour is smooth  Parenchyma:  Portions of the liver are obscured although echogenicity is only mildly heterogeneous  No evidence of suspicious mass  Limited imaging of the main portal vein shows it to be patent and hepatopetal   BILIARY: The gallbladder is normal in caliber  Borderline wall thickening of 3 mm is noted  An echogenic stone is identified within the gallbladder lumen  Weak acoustic shadowing is noted on cine loop review  No sonographic Lentz's sign  No intrahepatic biliary dilatation  CBD measures 5 mm  An echogenic focus is noted posteriorly near the course of the common bile duct but does not shadow  This could represent a very subtle stone within the duct versus adjacent fat  KIDNEY: Right kidney measures 14 2 cm  Within normal limits  ASCITES:   None  Impression: Evidence of gallstone with borderline wall thickening  The common duct is normal in caliber but small stone within the duct is possible versus adjacent fat   Direct biliary imaging would be useful for further assessment especially given transaminitis  Immediate was noted on the Epic system  Workstation performed: [id number]     Cta Ed Chest Pe Study    Result Date: 10/29/2019  Narrative: CTA - CHEST WITH IV CONTRAST - PULMONARY ANGIOGRAM INDICATION:   Shortness of breath  COMPARISON: 12/22/2012  TECHNIQUE: CTA examination of the chest was performed using angiographic technique according to a protocol specifically tailored to evaluate for pulmonary embolism  Axial, sagittal, and coronal 2D reformatted images were created from the source data and  submitted for interpretation  In addition, coronal 3D MIP postprocessing was performed on the acquisition scanner  Radiation dose length product (DLP) for this visit:  523 84 mGy-cm   This examination, like all CT scans performed in the [facility], was performed utilizing techniques to minimize radiation dose exposure, including the use of iterative  reconstruction and automated exposure control  IV Contrast:  85 mL of iohexol (OMNIPAQUE)  FINDINGS: PULMONARY ARTERIAL TREE:  No pulmonary embolus is seen  LUNGS:  Minimal subsegmental atelectasis at the base of the lingula  Respiratory motion artifact at the lung bases limits evaluation  There is no tracheal or endobronchial lesion  PLEURA:  No effusion  HEART/GREAT VESSELS:  Mild cardiomegaly  No thoracic aortic aneurysm  MEDIASTINUM AND MARGA:  Small mediastinal and subcarinal lymph nodes  No pathologic enlargement  CHEST WALL AND LOWER NECK:   Unremarkable  VISUALIZED STRUCTURES IN THE UPPER ABDOMEN:  Moderate to large hiatal hernia  OSSEOUS STRUCTURES:  No acute fracture or destructive osseous lesion  Impression: 1  No evidence of acute pulmonary embolus embolus or thoracic aortic aneurysm  No acute cardiopulmonary process  2   Moderate to large hiatal hernia   Workstation performed: [id number]     Imaging Reports Reviewed Today Include:   Imaging Personally Reviewed by Myself Includes:    Procedure: Mri Abdomen Wo Contrast And Mrcp    Result Date: 10/31/2019  Narrative: MRI OF THE ABDOMEN WITHOUT CONTRAST WITH MRCP INDICATION: Evaluate for choledocholithiasis  COMPARISON: Ultrasound from yesterday  TECHNIQUE: TECHNIQUE:  MRI of the abdomen was performed without the administration of contrast using the following  using sequences: Axial T1 weighted in/out of phase images, multiplanar T2 weighted images, diffusion weighted and fat suppressed T1 weighted images  3D MRCP images were obtained with radial thick slabs and projections  3D rendering was performed from the acquisition scanner  FINDINGS: LOWER CHEST:   Small bilateral pleural effusions  Cardiomegaly  Basilar atelectasis  Chua Joel LIVER: Hepatomegaly  No suspicious mass  Simple right lobe cyst measuring 1 5 cm  BILE DUCTS: MRCP images limited by body habitus  No intrahepatic or extrahepatic bile duct dilation  Common bile duct is normal in caliber  No gross choledocholithiasis, biliary stricture or suspicious mass  GALLBLADDER:  Small stones noted towards the neck image 12/2  PANCREAS: Normal  No main pancreatic ductal dilation  ADRENAL GLANDS:  Normal  SPLEEN:  Normal  KIDNEYS/PROXIMAL URETERS: No hydroureteronephrosis  No suspicious renal mass  BOWEL:   Moderate hiatal hernia  No obstruction  PERITONEAL CAVITY/RETROPERITONEUM: No ascites  Partially imaged masslike lesion anterior to the left psoas muscle as seen on image 5/16 measuring approximately 4 5 x 2 5 cm  LYMPH NODES: No abdominal lymphadenopathy  VASCULAR STRUCTURES:  Unremarkable  No aneurysm  ABDOMINAL WALL:  Unremarkable  OSSEOUS STRUCTURES:  No suspicious osseous lesion  Impression: Examination significantly limited by body habitus  There is no gross choledocholithiasis  CBD normal in diameter  Cholelithiasis with filling defects noted in the gallbladder neck  No surrounding inflammation  Simple hepatic cyst in the right lobe measuring 1 5 cm  Moderate hiatal hernia    Cardiomegaly with small bilateral pleural effusions  Partially imaged masslike lesion anterior to the left psoas muscle as seen on images 5/16 and 1402/95 measuring approximately 4 5 x 2 5 cm  This could represent adenopathy or other mass  CT scan of the abdomen and pelvis with contrast recommended  The study was marked in [facility] for immediate notification  Workstation performed: [id number]        Recent Cultures (last 7 days):     Results from last 7 days   Lab Units 10/29/19  1424   BLOOD CULTURE  No Growth After 4 Days  No Growth After 4 Days  Last 24 Hours Medication List:     Current Facility-Administered Medications:  acetaminophen 650 mg Oral Q6H PRN NICOLA Cho    acyclovir 400 mg Oral Daily NICOLA Cho    albuterol 2 puff Inhalation Q6H PRN NICOLA Cho    allopurinol 300 mg Oral Daily Domitila Dalton, NICOLA    atorvastatin 20 mg Oral Daily NICOLA Pfeiffer    bisacodyl 10 mg Rectal PRN NICOLA Cho    cefepime 1,000 mg Intravenous Q12H NICOLA Cho Last Rate: Stopped (11/03/19 0406)   fluticasone-vilanterol 1 puff Inhalation Daily NICOLA Pfeiffer    furosemide 40 mg Intravenous TID (diuretic) NICOLA Cho    insulin glargine 18 Units Subcutaneous HS NICOLA Pfeiffer    insulin lispro 1-6 Units Subcutaneous HS NICOLA Pfeiffer    insulin lispro 2-12 Units Subcutaneous TID AC NICOLA Pfeiffer    insulin lispro 30 Units Subcutaneous TID With Meals NICOLA Cho    metoprolol tartrate 25 mg Oral Q12H NICOLA Pfeiffer    vancomycin 1,000 mg Intravenous Q12H Estevan Guallpa MD Last Rate: 1,000 mg (11/02/19 2140)   warfarin 7 5 mg Oral Daily (warfarin) NICOLA Cho         Today, Patient Was Seen By: Fransisco Paul MD    ** Please Note: Dragon 360 Dictation voice to text software may have been used in the creation of this document   **

## 2019-11-03 NOTE — PROGRESS NOTES
Vancomycin Assessment    Renu Gregory is a 81 y o  male who is currently receiving vancomycin 1000mg q12h for skin-soft tissue infection soft tissue infection   Relevant clinical data and objective history reviewed:  Creatinine   Date Value Ref Range Status   11/03/2019 1 30 0 60 - 1 30 mg/dL Final     Comment:     Standardized to IDMS reference method   11/02/2019 1 24 0 60 - 1 30 mg/dL Final     Comment:     Standardized to IDMS reference method   11/01/2019 1 38 (H) 0 60 - 1 30 mg/dL Final     Comment:     Standardized to IDMS reference method   01/15/2018 0 99 0 70 - 1 25 mg/dL Final     Comment:     Result Comment: For patients >52years of age, the reference limit  for Creatinine is approximately 13% higher for people  identified as -American      05/25/2017 1 31 (H) 0 70 - 1 25 mg/dL Final     Comment:     Result Comment: For patients >52years of age, the reference limit  for Creatinine is approximately 13% higher for people  identified as -American      12/09/2016 1 18 0 70 - 1 25 mg/dL Final     Comment:     Result Comment: For patients >52years of age, the reference limit  for Creatinine is approximately 13% higher for people  identified as -American  Vancomycin Rm   Date Value Ref Range Status   08/02/2019 19 4 ug/mL Final     /74 (BP Location: Left arm)   Pulse 72   Temp 98 2 °F (36 8 °C) (Oral)   Resp 18   Ht 6' 3" (1 905 m)   Wt (!) 150 kg (330 lb 4 oz)   SpO2 91%   BMI 41 28 kg/m²   I/O last 3 completed shifts:   In: 110 [P O :60; IV Piggyback:50]  Out: 2275 [Urine:2275]  Lab Results   Component Value Date/Time    BUN 37 (H) 11/03/2019 05:03 AM    BUN 27 (H) 01/23/2018 02:29 PM    WBC 6 44 11/03/2019 05:02 AM    WBC 5 16 12/23/2015 06:00 AM    HGB 9 1 (L) 11/03/2019 05:02 AM    HGB 10 6 (L) 12/23/2015 06:00 AM    HCT 31 4 (L) 11/03/2019 05:02 AM    HCT 34 5 (L) 12/23/2015 06:00 AM    MCV 95 11/03/2019 05:02 AM    MCV 91 12/23/2015 06:00 AM     11/03/2019 05:02 AM     12/23/2015 06:00 AM     Temp Readings from Last 3 Encounters:   11/03/19 98 2 °F (36 8 °C) (Oral)   10/20/19 97 5 °F (36 4 °C) (Oral)   10/15/19 99 8 °F (37 7 °C) (Temporal)     Vancomycin Days of Therapy: 5    Assessment/Plan  The patient is currently on vancomycin utilizing scheduled dosing  Baseline risks associated with therapy include: concomitant nephrotoxic medications  The patient is receiving 1000mg q12h with the most recent vancomycin level being 25 5  and supratherapeutic based on a goal of 15-20 (appropriate for most indications) ; therefore, after clinical evaluation will be changed to 750 mg q12h   Pharmacy will continue to follow closely for s/sx of nephrotoxicity, infusion reactions and appropriateness of therapy  BMP and CBC will be ordered per protocol  Plan for trough as patient approaches steady state, prior to the 4th  dose at approximately 1030 on 11/5  Pharmacy will continue to follow the patients culture results and clinical progress daily      Everton Bradley, Pharmacist

## 2019-11-04 ENCOUNTER — APPOINTMENT (INPATIENT)
Dept: CT IMAGING | Facility: HOSPITAL | Age: 69
DRG: 602 | End: 2019-11-04
Payer: COMMERCIAL

## 2019-11-04 LAB
GLUCOSE SERPL-MCNC: 126 MG/DL (ref 65–140)
GLUCOSE SERPL-MCNC: 172 MG/DL (ref 65–140)
GLUCOSE SERPL-MCNC: 95 MG/DL (ref 65–140)
GLUCOSE SERPL-MCNC: 97 MG/DL (ref 65–140)
RYE IGE QN: NEGATIVE

## 2019-11-04 PROCEDURE — 99232 SBSQ HOSP IP/OBS MODERATE 35: CPT | Performed by: INTERNAL MEDICINE

## 2019-11-04 PROCEDURE — 94640 AIRWAY INHALATION TREATMENT: CPT

## 2019-11-04 PROCEDURE — 82948 REAGENT STRIP/BLOOD GLUCOSE: CPT

## 2019-11-04 PROCEDURE — 94760 N-INVAS EAR/PLS OXIMETRY 1: CPT

## 2019-11-04 PROCEDURE — 74177 CT ABD & PELVIS W/CONTRAST: CPT

## 2019-11-04 RX ORDER — CLINDAMYCIN HYDROCHLORIDE 150 MG/1
300 CAPSULE ORAL EVERY 6 HOURS SCHEDULED
Status: DISCONTINUED | OUTPATIENT
Start: 2019-11-04 | End: 2019-11-05 | Stop reason: HOSPADM

## 2019-11-04 RX ADMIN — METOPROLOL TARTRATE 25 MG: 25 TABLET ORAL at 21:24

## 2019-11-04 RX ADMIN — INSULIN LISPRO 30 UNITS: 100 INJECTION, SOLUTION INTRAVENOUS; SUBCUTANEOUS at 17:44

## 2019-11-04 RX ADMIN — INSULIN LISPRO 2 UNITS: 100 INJECTION, SOLUTION INTRAVENOUS; SUBCUTANEOUS at 08:37

## 2019-11-04 RX ADMIN — FUROSEMIDE 40 MG: 10 INJECTION, SOLUTION INTRAMUSCULAR; INTRAVENOUS at 05:09

## 2019-11-04 RX ADMIN — INSULIN GLARGINE 18 UNITS: 100 INJECTION, SOLUTION SUBCUTANEOUS at 21:24

## 2019-11-04 RX ADMIN — ALLOPURINOL 300 MG: 300 TABLET ORAL at 08:41

## 2019-11-04 RX ADMIN — FUROSEMIDE 40 MG: 10 INJECTION, SOLUTION INTRAMUSCULAR; INTRAVENOUS at 17:20

## 2019-11-04 RX ADMIN — ACYCLOVIR 400 MG: 800 TABLET ORAL at 11:12

## 2019-11-04 RX ADMIN — CEFEPIME HYDROCHLORIDE 1000 MG: 1 INJECTION, SOLUTION INTRAVENOUS at 03:53

## 2019-11-04 RX ADMIN — METOPROLOL TARTRATE 25 MG: 25 TABLET ORAL at 08:41

## 2019-11-04 RX ADMIN — ALBUTEROL SULFATE 2 PUFF: 90 AEROSOL, METERED RESPIRATORY (INHALATION) at 08:02

## 2019-11-04 RX ADMIN — FUROSEMIDE 40 MG: 10 INJECTION, SOLUTION INTRAMUSCULAR; INTRAVENOUS at 11:30

## 2019-11-04 RX ADMIN — IOHEXOL 100 ML: 350 INJECTION, SOLUTION INTRAVENOUS at 10:35

## 2019-11-04 RX ADMIN — WARFARIN SODIUM 7.5 MG: 7.5 TABLET ORAL at 17:23

## 2019-11-04 RX ADMIN — INSULIN LISPRO 30 UNITS: 100 INJECTION, SOLUTION INTRAVENOUS; SUBCUTANEOUS at 13:00

## 2019-11-04 RX ADMIN — INSULIN LISPRO 30 UNITS: 100 INJECTION, SOLUTION INTRAVENOUS; SUBCUTANEOUS at 08:36

## 2019-11-04 RX ADMIN — ATORVASTATIN CALCIUM 20 MG: 20 TABLET, FILM COATED ORAL at 08:41

## 2019-11-04 RX ADMIN — FLUTICASONE FUROATE AND VILANTEROL TRIFENATATE 1 PUFF: 200; 25 POWDER RESPIRATORY (INHALATION) at 08:02

## 2019-11-04 RX ADMIN — LEVOFLOXACIN 750 MG: 500 TABLET, FILM COATED ORAL at 11:28

## 2019-11-04 RX ADMIN — CLINDAMYCIN HYDROCHLORIDE 300 MG: 150 CAPSULE ORAL at 12:42

## 2019-11-04 RX ADMIN — CLINDAMYCIN HYDROCHLORIDE 300 MG: 150 CAPSULE ORAL at 17:24

## 2019-11-04 NOTE — ASSESSMENT & PLAN NOTE
Wt Readings from Last 3 Encounters:   11/04/19 (!) 150 kg (331 lb 9 6 oz)   10/20/19 (!) 159 kg (350 lb 8 5 oz)   10/15/19 (!) 159 kg (351 lb 6 6 oz)       · Prior echo with an EF of 15% grade 3 diastolic dysfunction  · proBNP 2827 on admisson  · Currently on 40 mg IV Lasix 3 times daily  · Medically stable for discharge, switch IV Lasix to oral Lasix  · Weight remains stable   · Daily weights  · Net -700 cc in the last 24 hours  · Strict I&O  · Goal net neg 1L/24hrs

## 2019-11-04 NOTE — PLAN OF CARE
Problem: DISCHARGE PLANNING - CARE MANAGEMENT  Goal: Discharge to post-acute care or home with appropriate resources  Description  INTERVENTIONS:  - Conduct assessment to determine patient/family and health care team treatment goals, and need for post-acute services based on payer coverage, community resources, and patient preferences, and barriers to discharge  - Address psychosocial, clinical, and financial barriers to discharge as identified in assessment in conjunction with the patient/family and health care team  - Arrange appropriate level of post-acute services according to patient's   needs and preference and payer coverage in collaboration with the physician and health care team  - Communicate with and update the patient/family, physician, and health care team regarding progress on the discharge plan  - Arrange appropriate transportation to post-acute venues   Outcome: Progressing     Problem: Nutrition/Hydration-ADULT  Goal: Nutrient/Hydration intake appropriate for improving, restoring or maintaining nutritional needs  Description  Monitor and assess patient's nutrition/hydration status for malnutrition  Collaborate with interdisciplinary team and initiate plan and interventions as ordered  Monitor patient's weight and dietary intake as ordered or per policy  Utilize nutrition screening tool and intervene as necessary  Determine patient's food preferences and provide high-protein, high-caloric foods as appropriate       INTERVENTIONS:  - Monitor oral intake, urinary output, labs, and treatment plans  - Assess nutrition and hydration status and recommend course of action  - Evaluate amount of meals eaten  - Assist patient with eating if necessary   - Allow adequate time for meals  - Recommend/ encourage appropriate diets, oral nutritional supplements, and vitamin/mineral supplements  - Order, calculate, and assess calorie counts as needed  - Recommend, monitor, and adjust tube feedings and TPN/PPN based on assessed needs  - Assess need for intravenous fluids  - Provide specific nutrition/hydration education as appropriate  - Include patient/family/caregiver in decisions related to nutrition  Outcome: Progressing     Problem: PAIN - ADULT  Goal: Verbalizes/displays adequate comfort level or baseline comfort level  Description  Interventions:  - Encourage patient to monitor pain and request assistance  - Assess pain using appropriate pain scale  - Administer analgesics based on type and severity of pain and evaluate response  - Implement non-pharmacological measures as appropriate and evaluate response  - Consider cultural and social influences on pain and pain management  - Notify physician/advanced practitioner if interventions unsuccessful or patient reports new pain  Outcome: Progressing     Problem: INFECTION - ADULT  Goal: Absence or prevention of progression during hospitalization  Description  INTERVENTIONS:  - Assess and monitor for signs and symptoms of infection  - Monitor lab/diagnostic results  - Monitor all insertion sites, i e  indwelling lines, tubes, and drains  - Monitor endotracheal if appropriate and nasal secretions for changes in amount and color  - [place] appropriate cooling/warming therapies per order  - Administer medications as ordered  - Instruct and encourage patient and family to use good hand hygiene technique  - Identify and instruct in appropriate isolation precautions for identified infection/condition  Outcome: Progressing     Problem: SAFETY ADULT  Goal: Patient will remain free of falls  Description  INTERVENTIONS:  - Assess patient frequently for physical needs  -  Identify cognitive and physical deficits and behaviors that affect risk of falls    -  [place] fall precautions as indicated by assessment   - Educate patient/family on patient safety including physical limitations  - Instruct patient to call for assistance with activity based on assessment  - Modify environment to reduce risk of injury  - Consider OT/PT consult to assist with strengthening/mobility  Outcome: Progressing  Goal: Maintain or return to baseline ADL function  Description  INTERVENTIONS:  -  Assess patient's ability to carry out ADLs; assess patient's baseline for ADL function and identify physical deficits which impact ability to perform ADLs (bathing, care of mouth/teeth, toileting, grooming, dressing, etc )  - Assess/evaluate cause of self-care deficits   - Assess range of motion  - Assess patient's mobility; develop plan if impaired  - Assess patient's need for assistive devices and provide as appropriate  - Encourage maximum independence but intervene and supervise when necessary  - Involve family in performance of ADLs  - Assess for home care needs following discharge   - Consider OT consult to assist with ADL evaluation and planning for discharge  - Provide patient education as appropriate  Outcome: Progressing  Goal: Maintain or return mobility status to optimal level  Description  INTERVENTIONS:  - Assess patient's baseline mobility status (ambulation, transfers, stairs, etc )    - Identify cognitive and physical deficits and behaviors that affect mobility  - Identify mobility aids required to assist with transfers and/or ambulation (gait belt, sit-to-stand, lift, walker, cane, etc )  - [place] fall precautions as indicated by assessment  - Record patient progress and toleration of activity level on Mobility SBAR; progress patient to next Phase/Stage  - Instruct patient to call for assistance with activity based on assessment  - Consider rehabilitation consult to assist with strengthening/weightbearing, etc   Outcome: Progressing     Problem: DISCHARGE PLANNING  Goal: Discharge to home or other facility with appropriate resources  Description  INTERVENTIONS:  - Identify barriers to discharge w/patient and caregiver  - Arrange for needed discharge resources and transportation as appropriate  - Identify discharge learning needs (meds, wound care, etc )  - Arrange for interpretive services to assist at discharge as needed  - Refer to Case Management Department for coordinating discharge planning if the patient needs post-hospital services based on physician/advanced practitioner order or complex needs related to functional status, cognitive ability, or social support system  Outcome: Progressing     Problem: Knowledge Deficit  Goal: Patient/family/caregiver demonstrates understanding of disease process, treatment plan, medications, and discharge instructions  Description  Complete learning assessment and assess knowledge base  Interventions:  - Provide teaching at level of understanding  - Provide teaching via preferred learning methods  Outcome: Progressing     Problem: Prexisting or High Potential for Compromised Skin Integrity  Goal: Skin integrity is maintained or improved  Description  INTERVENTIONS:  - Identify patients at risk for skin breakdown  - Assess and monitor skin integrity  - Assess and monitor nutrition and hydration status  - Monitor labs   - Assess for incontinence   - Turn and reposition patient  - Assist with mobility/ambulation  - Relieve pressure over bony prominences  - Avoid friction and shearing  - Provide appropriate hygiene as needed including keeping skin clean and dry  - Evaluate need for skin moisturizer/barrier cream  - Collaborate with interdisciplinary team   - Patient/family teaching  - Consider wound care consult   Outcome: Progressing     Problem: Potential for Falls  Goal: Patient will remain free of falls  Description  INTERVENTIONS:  - Assess patient frequently for physical needs  -  Identify cognitive and physical deficits and behaviors that affect risk of falls    -  [place] fall precautions as indicated by assessment   - Educate patient/family on patient safety including physical limitations  - Instruct patient to call for assistance with activity based on assessment  - Modify environment to reduce risk of injury  - Consider OT/PT consult to assist with strengthening/mobility  Outcome: Progressing

## 2019-11-04 NOTE — ASSESSMENT & PLAN NOTE
· Patient presented with worsening right lower extremity swelling with small blister on dorsal part of his right foot  · prior wound cultures positive for MRSA and Proteus  · X-ray R foot: No radiographic evidence of osteomyelitis or soft tissue air    · Procalcitonin 0 17  · Blood cultures NGTD  · Infectious Disease on board, they recommend discontinue cefepime and vancomycin and switching to levofloxacin and clindamycin

## 2019-11-04 NOTE — ASSESSMENT & PLAN NOTE
Lab Results   Component Value Date    HGBA1C 9 4 (H) 12/27/2018       Recent Labs     11/03/19  2132 11/04/19  0751 11/04/19  1212 11/04/19  1603   POCGLU 149* 172* 95 97       Blood Sugar Average: Last 72 hrs:  · (P) 368 [id number]   · continue Lantus/sliding-scale insulin  · Hold home metformin

## 2019-11-04 NOTE — PROGRESS NOTES
Vancomycin IV Pharmacy-to-Dose Consultation    Bogoie Wong is a 81 y o  male who is currently receiving Vancomycin IV   It has been discontinued by the provider  Pharmacy will sign off with respect to Vancomycin dosing and management  The previously ordered Vancomycin trough and Pharmacy general consult will be discontinued as well  Thank you for the opportunity to provide Pharmacy Consult services      Meaghan Manjarrez, Pharmacist

## 2019-11-04 NOTE — PROGRESS NOTES
Jesse Apodaca  81 y o   male  1950  mrn [id number]    Assessment/Plan:  Day # 7 of abx for RLE cellulitis  No fevers, clinically improved  Would change to oral abx, difficult to come up with choice with warfarin  - start Levaquin 750 mg qday X 3 days  - start clindamycin 300 mg qid X 3 days  - follow inr    OK for d/c from ID standpoint  Subjective:  No fevers  Had CT of psoas mass  Feeling well  Leg has less pain and drainage  Objective:    Gen: nad  Cor: rrr s1s2  Abd: soft nt  Ext: RLE decreased erythema and drainage    Labs:  CBC w/diff  Recent Labs     11/03/19  0502   WBC 6 44   HGB 9 1*   HCT 31 4*      NEUTOPHILPCT 70   LYMPHOPCT 14   MONOPCT 9   EOSPCT 4     BMP  Recent Labs     11/03/19  0503   K 3 9      CO2 29   BUN 37*   CREATININE 1 30   CALCIUM 8 1*     CMP  Recent Labs     11/03/19  0503   K 3 9      CO2 29   BUN 37*   CREATININE 1 30   CALCIUM 8 1*   ALKPHOS 331*   ALT 32   AST 24        labrc    Cultures:  Lab Results   Component Value Date    BLOODCX No Growth After 5 Days  10/29/2019    BLOODCX No Growth After 5 Days  10/29/2019    BLOODCX No Growth After 5 Days  07/25/2019    BLOODCX No Growth After 5 Days  07/25/2019    BLOODCX No Growth After 5 Days  12/25/2018    BLOODCX No Growth After 5 Days  12/25/2018    BLOODCX No Growth After 5 Days  03/23/2018    BLOODCX No Growth After 5 Days  03/23/2018    BLOODCX No Growth After 5 Days  10/08/2017    BLOODCX No Growth After 5 Days  10/08/2017    BLOODCX No Growth After 5 Days  09/15/2016    BLOODCX No Growth After 5 Days   09/15/2016     Lab Results   Component Value Date    WOUNDCULT 4+ Growth of Proteus mirabilis (A) 07/25/2019    WOUNDCULT 4+ Growth of  07/25/2019    WOUNDCULT (A) 07/25/2019     4+ Growth of Methicillin Resistant Staphylococcus aureus    WOUNDCULT 1+ Growth of Proteus mirabilis (A) 07/25/2019    WOUNDCULT 4+ Growth of  07/25/2019    WOUNDCULT 3+ Growth of Staphylococcus aureus (A) 12/25/2018    WOUNDCULT 2+ Growth of  12/25/2018    WOUNDCULT 4+ Growth of Staphylococcus aureus 09/15/2016    WOUNDCULT 4+ Growth of Proteus mirabilis 09/15/2016    WOUNDCULT 4+ Growth of Mixed Skin Nini 09/15/2016     No results found for: URINECX  No results found for: SPUTUMCULTUR    MED: reviewed      Current Facility-Administered Medications:     acetaminophen (TYLENOL) tablet 650 mg, 650 mg, Oral, Q6H PRN, NICOLA Aguilar, 650 mg at 10/29/19 2236    acyclovir (ZOVIRAX) tablet 400 mg, 400 mg, Oral, Daily, NICOLA Pfeiffer, 400 mg at 11/03/19 0932    albuterol (PROVENTIL HFA,VENTOLIN HFA) inhaler 2 puff, 2 puff, Inhalation, Q6H PRN, NICOLA Aguilar, 2 puff at 11/04/19 0802    allopurinol (ZYLOPRIM) tablet 300 mg, 300 mg, Oral, Daily, NICOLA Pfeiffer, 300 mg at 11/04/19 0841    atorvastatin (LIPITOR) tablet 20 mg, 20 mg, Oral, Daily, NICOLA Pfeiffer, 20 mg at 11/04/19 0841    bisacodyl (DULCOLAX) rectal suppository 10 mg, 10 mg, Rectal, PRN, NICOLA Aguilar    cefepime (MAXIPIME) 1 g/50 mL dextrose IVPB, 1,000 mg, Intravenous, Q12H, NICOLA Pfeiffer, Last Rate: 100 mL/hr at 11/04/19 0353, 1,000 mg at 11/04/19 0353    fluticasone-vilanterol (BREO ELLIPTA) 200-25 MCG/INH inhaler 1 puff, 1 puff, Inhalation, Daily, NICOLA Pfeiffer, 1 puff at 11/04/19 0802    furosemide (LASIX) injection 40 mg, 40 mg, Intravenous, TID (diuretic), NICOLA Aguilar, 40 mg at 11/04/19 0509    insulin glargine (LANTUS) subcutaneous injection 18 Units 0 18 mL, 18 Units, Subcutaneous, HS, NICOLA Pfeiffer, 18 Units at 11/03/19 2204    insulin lispro (HumaLOG) 100 units/mL subcutaneous injection 1-6 Units, 1-6 Units, Subcutaneous, HS, NICOLA Pfeiffer    insulin lispro (HumaLOG) 100 units/mL subcutaneous injection 2-12 Units, 2-12 Units, Subcutaneous, TID AC, 2 Units at 11/04/19 0837 **AND** Fingerstick Glucose (POCT), , , TID AC, NICOLA Pfeiffer    insulin lispro (HumaLOG) 100 units/mL subcutaneous injection 30 Units, 30 Units, Subcutaneous, TID With Meals, NICOLA Howard, 30 Units at 11/04/19 0836    metoprolol tartrate (LOPRESSOR) tablet 25 mg, 25 mg, Oral, Q12H, NICOLA Pfeiffer, 25 mg at 11/04/19 0841    vancomycin (VANCOCIN) IVPB (premix) 750 mg, 750 mg, Intravenous, Q12H, Melonie Bridges MD, Last Rate: 150 mL/hr at 11/03/19 2209, 750 mg at 11/03/19 2209    warfarin (COUMADIN) tablet 7 5 mg, 7 5 mg, Oral, Daily (warfarin), NICOLA Howard, 7 5 mg at 11/03/19 1850    Principal Problem:    Cellulitis  Active Problems:    Diabetes mellitus type 2, uncontrolled (HCC)    Chronic atrial fibrillation    Morbid obesity (HCC)    Acute on chronic diastolic congestive heart failure (HCC)    Hyperlipidemia    SOB (shortness of breath)    Moderate persistent asthma without complication    Multiple myeloma not having achieved remission (HCC)    Weakness    Transaminitis    Elevated troponin    Mass of psoas muscle    DORA (acute kidney injury) (RUST 75 )      Leonardo Loja MD

## 2019-11-04 NOTE — NURSING NOTE
Upon entering room pt reports scratching his right leg with onset of oozing and bleeding  aleven pad applied

## 2019-11-04 NOTE — ASSESSMENT & PLAN NOTE
-DDx includes R-sided CHF, SE of [place]  -MRCP: Examination significantly limited by body habitus  David Gavel is no gross choledocholithiasis   CBD normal in diameter  Cholelithiasis with filling defects noted in the gallbladder neck   No surrounding inflammation  Cardiomegaly with small bilateral pleural effusions  Partially imaged masslike lesion anterior to the left psoas muscle as seen on images 5/16 and 1402/95 measuring approximately 4 5 x 2 5 cm  This could represent adenopathy or other mass   CT scan of the abdomen and pelvis with contrast recommended  -RUQ U/S: Evidence of gallstone with borderline wall thickening  The common duct is normal in caliber but small stone within the duct is possible versus adjacent fat  Direct biliary imaging would be useful for further assessment especially given transaminitis        Plan  · Trend CMP - Trend ALP and T luciana  · Follow GGT results  · No acute intervention given unremarkable imaging fidnigns  · GI following

## 2019-11-04 NOTE — PROGRESS NOTES
Progress Note - Kalyan Negrete 1950, 81 y o  male MRN: [id number]    Unit/Bed#: [address] [place] [address] Encounter: [id number]    Primary Care Provider: Killian Mata MD   Date and time admitted to hospital: 10/29/2019  1:43 PM        * Cellulitis  Assessment & Plan  · Patient presented with worsening right lower extremity swelling with small blister on dorsal part of his right foot  · prior wound cultures positive for MRSA and Proteus  · X-ray R foot: No radiographic evidence of osteomyelitis or soft tissue air  · Procalcitonin 0 17  · Blood cultures NGTD  · Infectious Disease on board, they recommend discontinue cefepime and vancomycin and switching to levofloxacin and clindamycin    DORA (acute kidney injury) ([facility] [address] )  Assessment & Plan  -trend Cr    Mass of psoas muscle  Assessment & Plan  -incidental finding of psoas muscle mass noted on ERCP  For CT abdomen pelvis with contrast today    Elevated troponin  Assessment & Plan  · EKG with AFib no acute ST changes  · troponins indeterminate (non-MI trop elevation)     Transaminitis  Assessment & Plan  -DDx includes R-sided CHF, SE of Velcade  -MRCP: Examination significantly limited by body habitus  [facility] no gross choledocholithiasis   CBD normal in diameter  Cholelithiasis with filling defects noted in the gallbladder neck   No surrounding inflammation  Cardiomegaly with small bilateral pleural effusions  Partially imaged masslike lesion anterior to the left psoas muscle as seen on images 5/16 and 1402/95 measuring approximately 4 5 x 2 5 cm  This could represent adenopathy or other mass   CT scan of the abdomen and pelvis with contrast recommended  -RUQ U/S: Evidence of gallstone with borderline wall thickening  The common duct is normal in caliber but small stone within the duct is possible versus adjacent fat  Direct biliary imaging would be useful for further assessment especially given transaminitis        Plan  · Trend CMP - Trend ALP and T luciana  · Follow GGT results  · No acute intervention given unremarkable imaging fidnigns  · GI following    Weakness  Assessment & Plan  · PT/OT     Multiple myeloma not having achieved remission [facility])  Assessment & Plan  · Recently diagnosed 09/2019  · Patient was scheduled for Velcade on day of admission however due to weakness presented to the emergency room  · Outpatient Oncology follow-up    Moderate persistent asthma without complication  Assessment & Plan  · Without acute exacerbation  · Continue home Advair    SOB (shortness of breath)  Assessment & Plan  · Shortness of breath on exertion, progressively worsening prior to admission, multifactorial, secondary to acute CHF, morbid obesity and NALLELY  · CTA negative for PE  · lower extremity edema improving  · Remains on intravenous Lasix  · Saturations 97% on room air, keep O2 saturation greater than 92%    Hyperlipidemia  Assessment & Plan  · Continue home statin    Acute on chronic diastolic congestive heart failure (HCC)  Assessment & Plan  Wt Readings from Last 3 Encounters:   11/04/19 (!) 150 kg (331 lb 9 6 oz)   10/20/19 (!) 159 kg (350 lb 8 5 oz)   10/15/19 (!) 159 kg (351 lb 6 6 oz)       · Prior echo with an EF of 08% grade 3 diastolic dysfunction  · proBNP 2827 on admisson  · Currently on 40 mg IV Lasix 3 times daily  · Medically stable for discharge, switch IV Lasix to oral Lasix  · Weight remains stable   · Daily weights  · Net -700 cc in the last 24 hours  · Strict I&O  · Goal net neg 1L/24hrs        Morbid obesity ([address] )  Assessment & Plan  · Dietary education and counseling      Chronic atrial fibrillation  Assessment & Plan  · Continue home beta-blocker/Coumadin  · Follow INR    Diabetes mellitus type 2, uncontrolled (HCC)  Assessment & Plan  Lab Results   Component Value Date    HGBA1C 9 4 (H) 12/27/2018       Recent Labs     11/03/19  2132 11/04/19  0751 11/04/19  1212 11/04/19  1603   POCGLU 149* 172* 95 97       Blood Sugar Average: Last 72 hrs:  · (P) 237 [id number]   · continue Lantus/sliding-scale insulin  · Hold home metformin      VTE Pharmacologic Prophylaxis:   Pharmacologic: Warfarin (Coumadin)  Mechanical VTE Prophylaxis in Place: Yes    Patient Centered Rounds: I have performed bedside rounds with nursing staff today  Discussions with Specialists or Other Care Team Provider:  Infectious Disease    Education and Discussions with Family / Patient:  Patient updated    Time Spent for Care: 30 minutes  More than 50% of total time spent on counseling and coordination of care as described above  Current Length of Stay: 6 day(s)    Current Patient Status: Inpatient   Certification Statement: The patient will continue to require additional inpatient hospital stay due to Right lower extremity cellulitis requiring rehab placement    Discharge Plan:  Tomorrow    Code Status: Level 1 - Full Code      Subjective:   No overnight events    Objective:     Vitals:   Temp (24hrs), Av 3 °F (36 8 °C), Min:98 3 °F (36 8 °C), Max:98 3 °F (36 8 °C)    Temp:  [98 3 °F (36 8 °C)] 98 3 °F (36 8 °C)  HR:  [64-65] 65  Resp:  [18] 18  BP: (133-169)/(62-76) 150/67  SpO2:  [90 %-92 %] 90 %  Body mass index is 41 45 kg/m²  Input and Output Summary (last 24 hours): Intake/Output Summary (Last 24 hours) at 2019 1654  Last data filed at 2019 1646  Gross per 24 hour   Intake 762 ml   Output 2700 ml   Net -1938 ml       Physical Exam:     Physical Exam   Cardiovascular: Normal rate and regular rhythm  No murmur heard  Pulmonary/Chest: Effort normal and breath sounds normal  No respiratory distress  Abdominal: Soft  Bowel sounds are normal  He exhibits no distension  There is no tenderness  Musculoskeletal: He exhibits edema  Skin: Skin is warm       ()    Additional Data:     Labs:    Results from last 7 days   Lab Units 19  0502   WBC Thousand/uL 6 44   HEMOGLOBIN g/dL 9 1*   HEMATOCRIT % 31 4*   PLATELETS Thousands/uL 360 NEUTROS PCT % 70   LYMPHS PCT % 14   MONOS PCT % 9   EOS PCT % 4     Results from last 7 days   Lab Units 11/03/19  0503   SODIUM mmol/L 138   POTASSIUM mmol/L 3 9   CHLORIDE mmol/L 102   CO2 mmol/L 29   BUN mg/dL 37*   CREATININE mg/dL 1 30   ANION GAP mmol/L 7   CALCIUM mg/dL 8 1*   ALBUMIN g/dL 2 4*   TOTAL BILIRUBIN mg/dL 1 10*   ALK PHOS U/L 331*   ALT U/L 32   AST U/L 24   GLUCOSE RANDOM mg/dL 134     Results from last 7 days   Lab Units 11/03/19  0503   INR  2 20*     Results from last 7 days   Lab Units 11/04/19  1603 11/04/19  1212 11/04/19  0751 11/03/19  2132 11/03/19  1549 11/03/19  1143 11/03/19  1111 11/03/19  0721 11/02/19  2115 11/02/19  1612 11/02/19  1132 11/02/19  0702   POC GLUCOSE mg/dl 97 95 172* 149* 76 135 174* 139 153* 88 127 123         Results from last 7 days   Lab Units 10/29/19  2135 10/29/19  1424   LACTIC ACID mmol/L  --  1 8   PROCALCITONIN ng/ml 0 17  --            * I Have Reviewed All Lab Data Listed Above  * Additional Pertinent Lab Tests Reviewed: [address] Admission Reviewed    Recent Cultures (last 7 days):     Results from last 7 days   Lab Units 10/29/19  1424   BLOOD CULTURE  No Growth After 5 Days  No Growth After 5 Days         Last 24 Hours Medication List:     Current Facility-Administered Medications:  acetaminophen 650 mg Oral Q6H PRN Birdena Lesches, CRNP   acyclovir 400 mg Oral Daily Birdena Lesches, CRNP   albuterol 2 puff Inhalation Q6H PRN Birdena Lesches, CRNP   allopurinol 300 mg Oral Daily NICOLA Pfeiffer   atorvastatin 20 mg Oral Daily NICOLA Pfeiffer   bisacodyl 10 mg Rectal PRN Birdena Lesches, CRNP   clindamycin 300 mg Oral Q6H [address] Neyda Basilio MD   fluticasone-vilanterol 1 puff Inhalation Daily NICOLA Pfeiffer   furosemide 40 mg Intravenous TID (diuretic) Birdena Lesches, CRNP   insulin glargine 18 Units Subcutaneous HS NICOLA Pfeiffer   insulin lispro 1-6 Units Subcutaneous HS NICOLA Pfeiffer   insulin lispro 2-12 Units Subcutaneous TID AC NICOLA Pfieffer   insulin lispro 30 Units Subcutaneous TID With Meals NICOLA Harper   levofloxacin 750 mg Oral Q24H Ho Mott MD   metoprolol tartrate 25 mg Oral Q12H NICOLA Pfeiffer   warfarin 7 5 mg Oral Daily (warfarin) NICOLA Harper        Today, Patient Was Seen By: Clearnce Spurling, MD    ** Please Note: Dictation voice to text software may have been used in the creation of this document   **

## 2019-11-04 NOTE — PROGRESS NOTES
Kalyan Negrete  [id number]    81 y o   male      ASSESSMENT  1  Elevated liver function studies primarily alkaline phosphatase  This could be partly related to fatty liver  Alk-phos could be partly bone related from the patient's multiple myeloma  Mild elevation of the GGT  2  Gallstones asymptomatic  MRCP negative for common bile duct stone  3  Multiple myeloma possibly contributing to elevated alk-phos  4  Recent diarrhea probably antibiotics     PLAN  1  Observe liver function studies periodically  No further workup at this time  2  Reviewed with the patient particular symptoms of biliary tract disease i e  Postprandial abdominal pain  3  Check C diff for ongoing diarrhea continued  Patient has had 1 or 2 episodes today       Note will sign off case now  Please call if we   ( GI Service )  could be of further assistance in the future    Chief Complaint   Patient presents with    Shortness of Breath     pt states being SOB since last night, came to infusion center today, was sent down   pt states he feels like hes in heart failure       SUBJECTIVE/HPI follow-up for abnormal liver test    /67 (BP Location: Right arm)   Pulse 65   Temp 98 3 °F (36 8 °C) (Oral)   Resp 18   Ht 6' 3" (1 905 m)   Wt (!) 150 kg (331 lb 9 6 oz)   SpO2 90%   BMI 41 45 kg/m²     PHYSICALEXAM  General obese white male no acute distress  Eyes sclera anicteric movements intact  Neck no JVP  Chest decreased breath sounds no adventitious sounds  Cor S1-S2  Abdomen is soft obese nontender  Extremities reveal 1 to 2+ edema to the knees  Neuro alert oriented x3 no gross focal abnormality    Lab Results   Component Value Date    GLUCOSE 139 12/22/2015    CALCIUM 8 1 (L) 11/03/2019     01/15/2018    K 3 9 11/03/2019    CO2 29 11/03/2019     11/03/2019    BUN 37 (H) 11/03/2019    CREATININE 1 30 11/03/2019     Lab Results   Component Value Date    WBC 6 44 11/03/2019    HGB 9 1 (L) 11/03/2019    HCT 31 4 (L) 11/03/2019    MCV 95 11/03/2019     11/03/2019     Lab Results   Component Value Date    ALT 32 11/03/2019    AST 24 11/03/2019     (H) 11/03/2019    ALKPHOS 331 (H) 11/03/2019    BILITOT 0 6 01/15/2018     No components found for: AMYLJKJJJASE  No results found for: LIPASE  Lab Results   Component Value Date    IRON 49 (L) 11/01/2019    TIBC 254 11/01/2019    FERRITIN 824 (H) 11/01/2019     Lab Results   Component Value Date    INR 2 20 (H) 11/03/2019

## 2019-11-05 ENCOUNTER — HOSPITAL ENCOUNTER (OUTPATIENT)
Dept: INFUSION CENTER | Facility: HOSPITAL | Age: 69
Discharge: HOME | End: 2019-11-05
Attending: INTERNAL MEDICINE

## 2019-11-05 VITALS
HEART RATE: 60 BPM | DIASTOLIC BLOOD PRESSURE: 63 MMHG | BODY MASS INDEX: 39.17 KG/M2 | WEIGHT: 315 LBS | OXYGEN SATURATION: 93 % | RESPIRATION RATE: 18 BRPM | TEMPERATURE: 97.9 F | HEIGHT: 75 IN | SYSTOLIC BLOOD PRESSURE: 133 MMHG

## 2019-11-05 LAB
ALBUMIN SERPL BCP-MCNC: 2.5 G/DL (ref 3.5–5)
ALP SERPL-CCNC: 336 U/L (ref 46–116)
ALT SERPL W P-5'-P-CCNC: 23 U/L (ref 12–78)
ANION GAP SERPL CALCULATED.3IONS-SCNC: 9 MMOL/L (ref 4–13)
AST SERPL W P-5'-P-CCNC: 25 U/L (ref 5–45)
BILIRUB SERPL-MCNC: 1 MG/DL (ref 0.2–1)
BUN SERPL-MCNC: 38 MG/DL (ref 5–25)
CALCIUM SERPL-MCNC: 8.5 MG/DL (ref 8.3–10.1)
CHLORIDE SERPL-SCNC: 103 MMOL/L (ref 100–108)
CO2 SERPL-SCNC: 28 MMOL/L (ref 21–32)
CREAT SERPL-MCNC: 1.29 MG/DL (ref 0.6–1.3)
ERYTHROCYTE [DISTWIDTH] IN BLOOD BY AUTOMATED COUNT: 17.4 % (ref 11.6–15.1)
GFR SERPL CREATININE-BSD FRML MDRD: 57 ML/MIN/1.73SQ M
GLUCOSE SERPL-MCNC: 65 MG/DL (ref 65–140)
GLUCOSE SERPL-MCNC: 71 MG/DL (ref 65–140)
GLUCOSE SERPL-MCNC: 87 MG/DL (ref 65–140)
GLUCOSE SERPL-MCNC: 90 MG/DL (ref 65–140)
HCT VFR BLD AUTO: 30.7 % (ref 36.5–49.3)
HGB BLD-MCNC: 9 G/DL (ref 12–17)
INR PPP: 2.38 (ref 0.84–1.19)
MCH RBC QN AUTO: 27.6 PG (ref 26.8–34.3)
MCHC RBC AUTO-ENTMCNC: 29.3 G/DL (ref 31.4–37.4)
MCV RBC AUTO: 94 FL (ref 82–98)
PLATELET # BLD AUTO: 374 THOUSANDS/UL (ref 149–390)
PMV BLD AUTO: 10 FL (ref 8.9–12.7)
POTASSIUM SERPL-SCNC: 3.6 MMOL/L (ref 3.5–5.3)
PROT SERPL-MCNC: 6.9 G/DL (ref 6.4–8.2)
PROTHROMBIN TIME: 25.7 SECONDS (ref 11.6–14.5)
RBC # BLD AUTO: 3.26 MILLION/UL (ref 3.88–5.62)
SODIUM SERPL-SCNC: 140 MMOL/L (ref 136–145)
WBC # BLD AUTO: 6.4 THOUSAND/UL (ref 4.31–10.16)

## 2019-11-05 PROCEDURE — 94640 AIRWAY INHALATION TREATMENT: CPT

## 2019-11-05 PROCEDURE — 85027 COMPLETE CBC AUTOMATED: CPT | Performed by: INTERNAL MEDICINE

## 2019-11-05 PROCEDURE — 97535 SELF CARE MNGMENT TRAINING: CPT

## 2019-11-05 PROCEDURE — 97530 THERAPEUTIC ACTIVITIES: CPT

## 2019-11-05 PROCEDURE — 82948 REAGENT STRIP/BLOOD GLUCOSE: CPT

## 2019-11-05 PROCEDURE — 94760 N-INVAS EAR/PLS OXIMETRY 1: CPT

## 2019-11-05 PROCEDURE — 80053 COMPREHEN METABOLIC PANEL: CPT | Performed by: INTERNAL MEDICINE

## 2019-11-05 PROCEDURE — 85610 PROTHROMBIN TIME: CPT | Performed by: INTERNAL MEDICINE

## 2019-11-05 PROCEDURE — 99239 HOSP IP/OBS DSCHRG MGMT >30: CPT | Performed by: INTERNAL MEDICINE

## 2019-11-05 RX ADMIN — INSULIN LISPRO 30 UNITS: 100 INJECTION, SOLUTION INTRAVENOUS; SUBCUTANEOUS at 08:33

## 2019-11-05 RX ADMIN — FUROSEMIDE 40 MG: 10 INJECTION, SOLUTION INTRAMUSCULAR; INTRAVENOUS at 17:18

## 2019-11-05 RX ADMIN — FUROSEMIDE 40 MG: 10 INJECTION, SOLUTION INTRAMUSCULAR; INTRAVENOUS at 05:38

## 2019-11-05 RX ADMIN — INSULIN LISPRO 30 UNITS: 100 INJECTION, SOLUTION INTRAVENOUS; SUBCUTANEOUS at 17:22

## 2019-11-05 RX ADMIN — CLINDAMYCIN HYDROCHLORIDE 300 MG: 150 CAPSULE ORAL at 00:25

## 2019-11-05 RX ADMIN — WARFARIN SODIUM 7.5 MG: 7.5 TABLET ORAL at 17:18

## 2019-11-05 RX ADMIN — FLUTICASONE FUROATE AND VILANTEROL TRIFENATATE 1 PUFF: 200; 25 POWDER RESPIRATORY (INHALATION) at 08:13

## 2019-11-05 RX ADMIN — METOPROLOL TARTRATE 25 MG: 25 TABLET ORAL at 08:34

## 2019-11-05 RX ADMIN — FUROSEMIDE 40 MG: 10 INJECTION, SOLUTION INTRAMUSCULAR; INTRAVENOUS at 12:08

## 2019-11-05 RX ADMIN — CLINDAMYCIN HYDROCHLORIDE 300 MG: 150 CAPSULE ORAL at 17:23

## 2019-11-05 RX ADMIN — CLINDAMYCIN HYDROCHLORIDE 300 MG: 150 CAPSULE ORAL at 12:07

## 2019-11-05 RX ADMIN — ALBUTEROL SULFATE 2 PUFF: 90 AEROSOL, METERED RESPIRATORY (INHALATION) at 08:13

## 2019-11-05 RX ADMIN — ATORVASTATIN CALCIUM 20 MG: 20 TABLET, FILM COATED ORAL at 08:34

## 2019-11-05 RX ADMIN — ACYCLOVIR 400 MG: 800 TABLET ORAL at 08:34

## 2019-11-05 RX ADMIN — INSULIN LISPRO 30 UNITS: 100 INJECTION, SOLUTION INTRAVENOUS; SUBCUTANEOUS at 12:08

## 2019-11-05 RX ADMIN — CLINDAMYCIN HYDROCHLORIDE 300 MG: 150 CAPSULE ORAL at 05:38

## 2019-11-05 RX ADMIN — ALLOPURINOL 300 MG: 300 TABLET ORAL at 08:34

## 2019-11-05 RX ADMIN — LEVOFLOXACIN 750 MG: 500 TABLET, FILM COATED ORAL at 12:07

## 2019-11-05 NOTE — ASSESSMENT & PLAN NOTE
-DDx includes R-sided CHF, SE of Velcade  -MRCP: Examination significantly limited by body habitus  Carlo Salon is no gross choledocholithiasis   CBD normal in diameter  Cholelithiasis with filling defects noted in the gallbladder neck   No surrounding inflammation  Cardiomegaly with small bilateral pleural effusions  Partially imaged masslike lesion anterior to the left psoas muscle as seen on images 5/16 and 1402/95 measuring approximately 4 5 x 2 5 cm  This could represent adenopathy or other mass   CT scan of the abdomen and pelvis with contrast recommended  -RUQ U/S: Evidence of gallstone with borderline wall thickening  The common duct is normal in caliber but small stone within the duct is possible versus adjacent fat  Direct biliary imaging would be useful for further assessment especially given transaminitis        Plan  · Trend CMP - Trend ALP and T luciana  · Mildly elevated GGT results  · No acute intervention given unremarkable imaging findings  · GI following

## 2019-11-05 NOTE — ASSESSMENT & PLAN NOTE
Wt Readings from Last 3 Encounters:   11/05/19 (!) 159 kg (349 lb 14 4 oz)   10/20/19 (!) 159 kg (350 lb 8 5 oz)   10/15/19 (!) 159 kg (351 lb 6 6 oz)       · Prior echo with an EF of 88% grade 3 diastolic dysfunction  · proBNP 2827 on admisson  · Currently on 40 mg IV Lasix 3 times daily, switch intravenous Lasix to oral Lasix 40 mg b i d   · Medically stable for discharge, awaiting prior authorization approval from his insurance  · Weight remains stable, 349 lb today  · Daily weights  · Net -1700 cc in the last 24 hours  · Strict I&O  · Goal net neg 1L/24hrs

## 2019-11-05 NOTE — DISCHARGE INSTRUCTIONS
Heart Failure   WHAT YOU NEED TO KNOW:   Heart failure (HF) is a condition that does not allow your heart to fill or pump properly  Not enough oxygen in your blood gets to your organs and tissues  HF can occur in the right side, the left side, or both lower chambers of your heart  HF is often caused by damage or injury to your heart  The damage may be caused by heart attack, other heart conditions, or high blood pressure  HF is a long-term condition that tends to get worse over time  It is important to manage your health to improve your quality of life  HF can be worsened by heavy alcohol use, smoking, diabetes that is not controlled, or obesity  DISCHARGE INSTRUCTIONS:   Call 911 if:   · You have any of the following signs of a heart attack:      ¨ Squeezing, pressure, or pain in your chest that lasts longer than 5 minutes or returns    ¨ Discomfort or pain in your back, neck, jaw, stomach, or arm     ¨ Trouble breathing    ¨ Nausea or vomiting    ¨ Lightheadedness or a sudden cold sweat, especially with chest pain or trouble breathing    Seek care immediately if:   · You gain 3 or more pounds (1 4 kg) in a day, or more than your healthcare provider says you should  · Your heartbeat is fast, slow, or uneven all the time  Contact your healthcare provider if:   · You have symptoms of worsening HF:      ¨ Shortness of breath at rest, at night, or that is getting worse in any way     ¨ Weight gain of 5 or more pounds (2 2 kg) in a week     ¨ More swelling in your legs or ankles     ¨ Abdominal pain or swelling     ¨ More coughing     ¨ Loss of appetite     ¨ Feeling tired all the time    · You feel hopeless or depressed, or you have lost interest in things you used to enjoy  · You often feel worried or afraid  · You have questions or concerns about your condition or care  Medicines: You may  need any of the following:  · Medicines  may be given to help regulate your heart rhythm   You may also need medicines to lower your blood pressure, and to get rid of extra fluids  · Take your medicine as directed  Contact your healthcare provider if you think your medicine is not helping or if you have side effects  Tell him or her if you are allergic to any medicine  Keep a list of the medicines, vitamins, and herbs you take  Include the amounts, and when and why you take them  Bring the list or the pill bottles to follow-up visits  Carry your medicine list with you in case of an emergency  Follow up with your healthcare provider or cardiologist as directed: You may need to return for other tests  You may need home health care  A healthcare provider will monitor your vital signs, weight, and make sure your medicines are working  Write down your questions so you remember to ask them during your visits  Go to cardiac rehab as directed:  Cardiac rehab is a program run by specialists who will help you safely strengthen your heart  The program includes exercise, relaxation, stress management, and heart-healthy nutrition  Healthcare providers will also make sure your medicines are helping to reduce your symptoms  Manage your HF:  · Do not smoke  Nicotine and other chemicals in cigarettes and cigars can cause lung damage and make HF difficult to manage  Ask your healthcare provider for information if you currently smoke and need help to quit  E-cigarettes or smokeless tobacco still contain nicotine  Talk to your healthcare provider before you use these products  · Do not drink alcohol or take illegal drugs  Alcohol and drugs can worsen your symptoms quickly  · Weigh yourself every morning  Use the same scale, in the same spot  Do this after you use the bathroom, but before you eat or drink anything  Wear the same type of clothing  Do not wear shoes  Record your weight each day so you will notice any sudden weight gain  Swelling and weight gain are signs of fluid retention   If you are overweight, ask how to lose weight safely  · Check your blood pressure and heart rate every day  Ask for more information about how to measure your blood pressure and heart rate correctly  Ask what these numbers should be for you  · Manage any chronic health conditions you have  These include high blood pressure, diabetes, obesity, high cholesterol, metabolic syndrome, and COPD  You will have fewer symptoms if you manage these health conditions  Follow your healthcare provider's recommendations and follow up with him or her regularly  · Eat heart-healthy foods and limit sodium (salt)  An easy way to do this is to eat more fresh fruits and vegetables and fewer canned and processed foods  Replace butter and margarine with heart-healthy oils such as olive oil and canola oil  Other heart-healthy foods include walnuts, whole-grain breads, low-fat dairy products, beans, and lean meats  Fatty fish such as salmon and tuna are also heart healthy  Ask how much salt you can eat each day  Do not use salt substitutes  · Drink liquids as directed  You may need to limit the amount of liquids you drink if you retain fluid  Ask how much liquid to drink each day and which liquids are best for you  · Stay active  If you are not active, your symptoms are likely to worsen quickly  Walking, bicycling, and other types of physical activity help maintain your strength and improve your mood  Physical activity also helps you manage your weight  Work with your healthcare provider to create an exercise plan that is right for you  · Get vaccines as directed  Get a flu shot every year  You may also need the pneumonia vaccine  The flu and pneumonia can be severe for a person who has HF  Vaccines protect you from these infections  Join a support group:  Living with HF can be difficult  It may be helpful to talk with others who have HF  You may learn how to better manage your condition or get emotional support   For more information:   · [address]  [place] , [place]   Phone: Jpgqbrnyx 8405  Web Address: https://[employer] com/  org   © 2017 [employer] Information is for End User's use only and may not be sold, redistributed or otherwise used for commercial purposes  All illustrations and images included in CareNotes® are the copyrighted property of [employer] , [employer]  or [employer]  The above information is an  only  It is not intended as medical advice for individual conditions or treatments  Talk to your doctor, nurse or pharmacist before following any medical regimen to see if it is safe and effective for you

## 2019-11-05 NOTE — ASSESSMENT & PLAN NOTE
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

## 2019-11-05 NOTE — ASSESSMENT & PLAN NOTE
Lab Results   Component Value Date    HGBA1C 9 4 (H) 12/27/2018       Recent Labs     11/04/19  1603 11/04/19  2114 11/05/19  0713 11/05/19  1210   POCGLU 97 126 87 90       Blood Sugar Average: Last 72 hrs:  · (P) 049 [id number]   · continue Lantus/sliding-scale insulin  · Blood sugars remained stable and well controlled ranging between   · Hold home metformin

## 2019-11-05 NOTE — SOCIAL WORK
Continue to follow  Auth obtained from BHAVIK Zavala, [facility] # is [id number], 3 days update 11/7 to Anupam Dong at  [facility] [phone]  [facility] ambulance auth # is [id number]  Call placed to [facility], spoke with Queen Bernardo, [facility] ambulance to [facility]  Pickup is 7pm  Pt's nurse informed of transport time  Call placed to [place] in admissions at [facility], informed of transport time and insurance auth  Call placed to Pt's son(Gerald: [phone]), informed Pt's son of transport to [facility] at 7:00pm and in agreement  Pt informed of transport to [facility] and in agreement  Faxed discharge instructions and PASRR to [facility]

## 2019-11-05 NOTE — UTILIZATION REVIEW
Initial Clinical Review    Admission: Date/Time/Statement:   Inpatient Admission Orders (From admission, onward)     Ordered        10/29/19 1906  Inpatient Admission (expected length of stay for this patient Order details is greater than two midnights)  Once                   Orders Placed This Encounter   Procedures    Inpatient Admission (expected length of stay for this patient Order details is greater than two midnights)     Standing Status:   Standing     Number of Occurrences:   1     Order Specific Question:   Admitting Physician     Answer:   Ciro Licona [[id number]]     Order Specific Question:   Level of Care     Answer:   Med Surg [16]     Order Specific Question:   Estimated length of stay     Answer:   More than 2 Midnights     Order Specific Question:   Certification     Answer:   I certify that inpatient services are medically necessary for this patient for a duration of greater than two midnights  See H&P and MD Progress Notes for additional information about the patient's course of treatment  ED Arrival Information     Expected Arrival Acuity Means of Arrival Escorted By Service Admission Type    - 10/29/2019 13:40 Emergent Wheelchair Other (Comment) Hospitalist Emergency    Arrival Complaint    SOB, leg cellulitis        Chief Complaint   Patient presents with    Shortness of Breath     pt states being SOB since last night, came to infusion center today, was sent down  pt states he feels like hes in heart failure     Assessment/Plan: 81year old male, presented to the ED from the [facility] secondary to weakness unable to ambulate to receive infusion via wheelchair  Admitted as Inpatient due to cellulitis  Patient comes from nursing home and states that he has had increasing dyspnea on exertion over the last few weeks along with weakness resulting in falls  Multiple myeloma not having achieved remission:  Recently diagnosed 09/2019    Patient was scheduled for Velcade today however due to weakness presented to the emergency room  Oncology follow-up  Cellulitis:  Patient presents with worsening right lower extremity swelling with small blister on dorsal part of his right foot  WBC count 7 3, temperature 100 8°  Continue cefepime/vancomycin given prior wound cultures positive for MRSA and Proteus  Will check x-ray of the right lower extremity given the blister formation to rule out any underlying subcutaneous air  Procalcitonin  Blood cultures pending  Weakness:  Consult PT/OT  Weakness: In the setting of CHF concern for right-sided however prior echo with RV normal   Diurese-Will dose 40 mg of IV Lasix t i d  For goal net -1 to 2 L   CMP  Denies abdominal pain will check right upper quadrant ultrasound      ED Triage Vitals   Temperature Pulse Respirations Blood Pressure SpO2   10/29/19 1400 10/29/19 1352 10/29/19 1353 10/29/19 1353 10/29/19 1352   (!) 100 8 °F (38 2 °C) 88 (!) 25 138/62 95 %      Temp Source Heart Rate Source Patient Position - Orthostatic VS BP Location FiO2 (%)   10/29/19 2037 10/29/19 1352 10/29/19 1353 10/29/19 1353 --   Oral Monitor Lying Right arm       Pain Score       10/29/19 1352       No Pain          Wt Readings from Last 1 Encounters:   11/05/19 (!) 159 kg (349 lb 14 4 oz)     Additional Vital Signs:   Date/Time  Temp  Pulse  Resp  BP  SpO2  O2 Device  Patient Position - Orthostatic VS   10/30/19 0740  --  --  --  --  91 %  --  --   10/30/19 0726  99 2 °F (37 3 °C)  --  --  155/68  90 %  None (Room air)  Lying   10/29/19 2300  98 3 °F (36 8 °C)  63  18  111/53  92 %  None (Room air)  Lying   10/29/19 2100  --  --  --  --  --  None (Room air)  --   10/29/19 2037  100 8 °F (38 2 °C)Abnormal   73  25Abnormal   132/60  94 %  None (Room air)  Lying   10/29/19 1814  --  78  24Abnormal   138/68  91 %  None (Room air)  Sitting   10/29/19 1601  --  84  28Abnormal   137/62  99 %  None (Room air)  Sitting     Pertinent Labs/Diagnostic Test Results:   10/29 @ 9261 Chest X:  Low lung volumes with bibasilar atelectasis with no definite pneumonia  Retrograde cardiac opacity due to hiatal hernia  10/29/2019 @ 0665  CTa chest:  1   No evidence of acute pulmonary embolus embolus or thoracic aortic aneurysm   No acute cardiopulmonary process  2   Moderate to large hiatal hernia  10/29/2019 @ 1359  EC  Atrial fibrillation, Incomplete right bundle branch block, Nonspecific ST abnormality      Results from last 7 days   Lab Units 19  0559 19  0502 19  0844 19  0530 10/31/19  0945   WBC Thousand/uL 6 40 6 44 6 07 6 11 6 30   HEMOGLOBIN g/dL 9 0* 9 1* 8 8* 8 4* 8 5*   HEMATOCRIT % 30 7* 31 4* 30 1* 27 9* 28 6*   PLATELETS Thousands/uL 374 360 335 335 336   NEUTROS ABS Thousands/µL  --  4 47 4 50 4 47 4 98     Results from last 7 days   Lab Units 19  0559 19  0503 19  0528 19  0530 10/31/19  0945   SODIUM mmol/L 140 138 138 139 136   POTASSIUM mmol/L 3 6 3 9 3 8 3 6 3 5   CHLORIDE mmol/L 103 102 102 103 101   CO2 mmol/L 28 29 26 27 28   ANION GAP mmol/L 9 7 10 9 7   BUN mg/dL 38* 37* 35* 36* 33*   CREATININE mg/dL 1 29 1 30 1 24 1 38* 1 48*   EGFR ml/min/1 73sq m 57 56 60 53 48   CALCIUM mg/dL 8 5 8 1* 8 5 8 4 8 6     Results from last 7 days   Lab Units 19  0559 19  0503 19  0530 10/31/19  0945 10/30/19  0521   AST U/L 25 24 24 29 42   ALT U/L 23 32 39 52 64   ALK PHOS U/L 336* 331* 349* 356* 349*   TOTAL PROTEIN g/dL 6 9 6 7 6 4 6 5 6 4   ALBUMIN g/dL 2 5* 2 4* 2 3* 2 4* 2 4*   TOTAL BILIRUBIN mg/dL 1 00 1 10* 1 30* 1 90* 1 50*   BILIRUBIN DIRECT mg/dL  --   --   --  0 74*  --      Results from last 7 days   Lab Units 19  1210 19  0713 19  2114 19  1603 19  1212 19  0751 19  2132 19  1549 19  1143 19  1111   POC GLUCOSE mg/dl 90 87 126 97 95 172* 149* 76 135 174*     Results from last 7 days   Lab Units 19  0559 19  0503 19  5130 11/01/19  0530 10/31/19  0945 10/30/19  0521 10/29/19  1424   GLUCOSE RANDOM mg/dL 65 134 102 129 153* 80 182*     Results from last 7 days   Lab Units 10/30/19  0041 10/29/19  2135 10/29/19  1424   TROPONIN I ng/mL 0 16* 0 16* 0 15*     Results from last 7 days   Lab Units 11/05/19  0559 11/03/19  0503 11/02/19  0844   PROTIME seconds 25 7* 24 1* 23 3*   INR  2 38* 2 20* 2 11*     Results from last 7 days   Lab Units 10/29/19  2135   PROCALCITONIN ng/ml 0 17     Results from last 7 days   Lab Units 10/29/19  1424   LACTIC ACID mmol/L 1 8     Results from last 7 days   Lab Units 10/29/19  1424   NT-PRO BNP pg/mL 2,827*     Results from last 7 days   Lab Units 10/29/19  1612   CLARITY UA  Clear   COLOR UA  Yellow   SPEC GRAV UA  1 015   PH UA  5 0   GLUCOSE UA mg/dl Negative   KETONES UA mg/dl Negative   BLOOD UA  Negative   PROTEIN UA mg/dl Negative   NITRITE UA  Negative   BILIRUBIN UA  Negative   UROBILINOGEN UA E U /dl 2 0*   LEUKOCYTES UA  Negative     ED Treatment:   Medication Administration from 10/29/2019 1340 to 10/29/2019 1948       Date/Time Order Dose Route Action     10/29/2019 1645 vancomycin (VANCOCIN) 2,000 mg in sodium chloride 0 9 % 500 mL IVPB 2,000 mg Intravenous New Bag     10/29/2019 1612 cefepime (MAXIPIME) 2 g/50 mL dextrose IVPB 2,000 mg Intravenous New Bag     10/29/2019 1759 iohexol (OMNIPAQUE) 350 MG/ML injection (MULTI-DOSE) 85 mL 85 mL Intravenous Given        Past Medical History:   Diagnosis Date    Cancer ([facility] )     CHF (congestive heart failure) ([place] [address] )     Chronic kidney disease     COPD (chronic obstructive pulmonary disease) ([facility] )     Decubitus ulcer of heel     LAST ASSESSED [id number]    Diabetes mellitus ([facility] )     History of varicose veins     Hypertension     Intermittent claudication (HCC)     Neuropathy     Seasonal allergies      Present on Admission:   Diabetes mellitus type 2, uncontrolled ([place] [address] )   Chronic atrial fibrillation   Acute on chronic diastolic congestive heart failure (HCC)   Morbid obesity (HCC)   Hyperlipidemia   Moderate persistent asthma without complication   Multiple myeloma not having achieved remission (HCC)   Cellulitis   Weakness   Transaminitis   Elevated troponin   SOB (shortness of breath)   Mass of psoas muscle      Admitting Diagnosis: Cellulitis [L03 90]  SOB (shortness of breath) [R06 02]  Elevated troponin [R79 89]  Age/Sex: 81 y o  male  Admission Orders:  Medications:  acyclovir 400 mg Oral Daily   allopurinol 300 mg Oral Daily   atorvastatin 20 mg Oral Daily   cefepime 1,000 mg Intravenous Q12H   fluticasone-vilanterol 1 puff Inhalation Daily   furosemide 40 mg Intravenous TID (diuretic)   influenza vaccine 0 5 mL Intramuscular Once   insulin glargine 18 Units Subcutaneous HS   insulin lispro 1-6 Units Subcutaneous HS   insulin lispro 2-12 Units Subcutaneous TID AC   insulin lispro 30 Units Subcutaneous TID With Meals   metoprolol tartrate 25 mg Oral Q12H   vancomycin 1,500 mg Intravenous Q12H   warfarin 7 5 mg Oral Daily (warfarin)     acetaminophen 650 mg Oral Q6H PRN   albuterol 2 puff Inhalation Q6H PRN   bisacodyl 10 mg Rectal PRN   TELM    IP CONSULT TO WOUND CARE  IP CONSULT TO CASE MANAGEMENT  IP CONSULT TO GASTROENTEROLOGY  IP CONSULT TO INFECTIOUS DISEASES    Network Utilization Review Department  [email or web address]  ATTENTION: Please call with any questions or concerns to [phone] and carefully listen to the prompts so that you are directed to the right person  All voicemails are confidential   [place] all requests for admission clinical reviews, approved or denied determinations and any other requests to dedicated fax number below belonging to the campus where the patient is receiving treatment    FACILITY NAME UR FAX NUMBER   ADMISSION DENIALS (Administrative/Medical Necessity) [phone]   [facility] (Maternity/NICU/Pediatrics) [place] [address] [place] [phone]   [facility] [phone]   [address] [facility]  [place] [address] [facility] [phone]   [address] [phone]

## 2019-11-05 NOTE — SOCIAL WORK
Continue to follow  Lidia Carrasco can accept Pt  Call placed to Pt's [employer] insurance([phone]), pending reference # [id number]  Faxed requested information to [facility] to [phone]  Await determination  Will follow

## 2019-11-05 NOTE — PHYSICAL THERAPY NOTE
PHYSICAL THERAPY NOTE       11/05/19 1030   Pain Assessment   Pain Assessment No/denies pain   Restrictions/Precautions   Other Precautions Contact/isolation;Agitated; Fall Risk   General   Chart Reviewed Yes   Response to Previous Treatment Patient with no complaints from previous session  Family/Caregiver Present No   Cognition   Overall Cognitive Status WFL   Arousal/Participation Alert   Attention Within functional limits   Orientation Level Oriented X4   Following Commands Follows one step commands with increased time or repetition   Comments Patient impulsive and agitated at times  Subjective   Subjective "I can do it myself "   Bed Mobility   Supine to Sit 4  Minimal assistance   Additional items Assist x 1;HOB elevated; Bedrails; Increased time required;Verbal cues   Additional Comments OOB in w/c with call bell and all needs met   Transfers   Sit to Stand 3  Moderate assistance   Additional items Assist x 2;Armrests; Increased time required;Verbal cues; Impulsive   Stand to Sit 3  Moderate assistance   Additional items Assist x 2;Armrests; Increased time required;Verbal cues   Stand pivot 3  Moderate assistance   Additional items Assist x 2;Armrests; Increased time required;Verbal cues  (RW)   Additional Comments 2 stand pivot transfers performed  bed to commode  commode to w/c  Verbal cues for hand placement and technique  Patient impulsive and agitated and stating that he can do it himself  Stood for approx 2 minutes for alaina-care with mod A x 2 and assist of another for wiping      Balance   Static Sitting Fair +   Dynamic Sitting Fair   Static Standing Fair -   Dynamic Standing Poor +   Ambulatory Poor +   Endurance Deficit   Endurance Deficit Yes   Activity Tolerance   Activity Tolerance Patient limited by fatigue   Medical Staff Made Aware REGINO Gamble   Nurse Made Aware PERLA Ashby and MARKIE brandt   Assessment   Prognosis Fair Problem List Decreased strength;Decreased endurance; Impaired balance;Decreased mobility; Decreased safety awareness; Impaired judgement;Obesity; Impaired sensation;Decreased skin integrity;Pain   Assessment Patient received supine in bed  Stated that he needed to use the commode  Required assist of two for transfers  Patient demonstrates self limiting behavior and needs to be re-directed and encouraged to participate  He is also impulsive stating that he can do it alone despite needing assistance of 2  Decreased endurance and activity tolerance  Fatigues easily requiring rest breaks  He will require rehab at discharge  Barriers to Discharge Inaccessible home environment;Decreased caregiver support   Goals   Patient Goals To go home   STG Expiration Date 11/09/19   Plan   Treatment/Interventions Functional transfer training;LE strengthening/ROM; Therapeutic exercise; Endurance training;Equipment eval/education; Bed mobility;Gait training;Spoke to nursing;OT   Progress Slow progress, decreased activity tolerance   PT Frequency Other (Comment)  (3-5x/wk)   Recommendation   Recommendation Short-term skilled PT   Equipment Recommended Wheelchair;Anish Bolaños, PT            Patient Name: Boogie Wong  MARI Date: 11/5/2019

## 2019-11-05 NOTE — ASSESSMENT & PLAN NOTE
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

## 2019-11-05 NOTE — ASSESSMENT & PLAN NOTE
Lab Results   Component Value Date    HGBA1C 9 4 (H) 12/27/2018       Recent Labs     11/04/19  1603 11/04/19  2114 11/05/19  0713 11/05/19  1210   POCGLU 97 126 87 90       Blood Sugar Average: Last 72 hrs:  · (P) 458 [id number]   · continue Lantus/sliding-scale insulin  · Blood sugars remained stable and well controlled ranging between   · Hold home metformin

## 2019-11-05 NOTE — OCCUPATIONAL THERAPY NOTE
[address] Treatment Note     Patient Name: Aleksandra VELASQUEZ Date: 11/5/2019  Problem List  Principal Problem:    Cellulitis  Active Problems:    Diabetes mellitus type 2, uncontrolled ([facility] )    Chronic atrial fibrillation    Morbid obesity (HCC)    Acute on chronic diastolic congestive heart failure (HCC)    Hyperlipidemia    SOB (shortness of breath)    Moderate persistent asthma without complication    Multiple myeloma not having achieved remission (HCC)    Weakness    Transaminitis    Elevated troponin    Mass of psoas muscle    DORA (acute kidney injury) ([facility] )     Time: 2900-7551 (27 min tx)       11/05/19 1029   Restrictions/Precautions   Weight Bearing Precautions Per Order No   Braces or Orthoses   (none)   Other Precautions Agitated;Contact/isolation; Fall Risk   Pain Assessment   Pain Assessment No/denies pain   Pain Score No Pain   ADL   UB Dressing Assistance 4  Minimal Assistance   UB Dressing Comments Ax1 seated EOB to don hospital gown   LB Dressing Assistance 2  Maximal Assistance   LB Dressing Comments Ax1 EOB to don socks   Toileting Assistance  2  Maximal Assistance   Toileting Comments Ax2-3 for standing balance, agitation, hygiene and clothing management in standing   Functional Standing Tolerance   Time 1 min 30 sec   Activity hygiene and clothing management w/ toileting and functional transfers w/ use of RW   Comments agitated, impulsive, cues for body positioning, RW management, hand placement  unsafe behaviors noted this session  Bed Mobility   Supine to Sit 4  Minimal assistance   Additional items Assist x 1;HOB elevated; Bedrails; Increased time required;Verbal cues;LE management   Transfers   Sit to Stand 3  Moderate assistance   Additional items Assist x 2;Armrests; Increased time required;Verbal cues  (from EOB (bed elevated) and from [facility])   Stand to Sit 3  Moderate assistance   Additional items Assist x 2; Increased time required;Verbal cues   Stand pivot 3  Moderate assistance   Additional items Assist x 2; Increased time required;Verbal cues   Toilet transfer 3  Moderate assistance   Additional items Assist x 2; Increased time required;Verbal cues; Commode  (w/ rails)   Additional Comments oob seated in w/c at end of session  all transfers completed w/ RW   Toilet Transfers   Toilet Transfer From Bed; Wheelchair   Toilet Transfer Type To and from   Toilet Transfer to Extra wide bedside commode   Toilet Transfer Technique Stand pivot   Toilet Transfers Moderate assistance  (Ax2)   Toilet Transfers Comments w/ RW   Cognition   Overall Cognitive Status WFL   Arousal/Participation Alert; Responsive  (agitated)   Attention Attends with cues to redirect   Orientation Level Oriented X4   Memory Within functional limits   Following Commands Follows one step commands with increased time or repetition   Comments agitated this date  impulsive, decreased safety, decreased insight into deficits  cues for fall prevention  Activity Tolerance   Activity Tolerance Patient limited by fatigue;Treatment limited secondary to agitation   Medical Staff Made Aware PT S[place], OK to see per RN Josh Helton,   Assessment   Assessment Arrived to patient supine in bed agreeable to therapy  Agitated throughout session especially w/ mobility, requesting to use bathroom  however states "I can't walk to the bathroom from my bed"  [facility] brought in, completed bed mobility w/ min A x1  Donned hospital gown (patient naked in bed on arrival) with min A  Patient requiring max A for donning socks at this time  Impulsive and requires cues for pacing, RW management and hand placement  Completed transfers w/ mod A x2 throughout session  At this time patient appears self limiting and presents with decreased insight into deficits and safety awareness and continues to state "I can do this so much better without anyone's help"  OT provided encouragement and support   Patient completed toileting w/ max A (x2 physical assist in standing + x1 A for hygiene management) - able to tolerate 1 min 30 sec of standing prior to fatigue  At end of session, patient remains seated upright in manual wheelchair with all needs in reach  Patient to continue to benefit from acute OT services while in the hospital to address remaining deficits     Plan   Goal Expiration Date 11/09/19   OT Treatment Day 2   OT Frequency 2-3x/wk   Recommendation   OT Discharge Recommendation Short Term Rehab   OT - OK to Discharge Yes  (to rehab when medically stable)         Geneva Johnson MS, OTR/L

## 2019-11-05 NOTE — DISCHARGE SUMMARY
Discharge- Mel Cue 1950, 81 y o  male MRN: [id number]    Unit/Bed#: [address] [place] [address] Encounter: [id number]    Primary Care Provider: Goi Clay MD   Date and time admitted to hospital: 10/29/2019  1:43 PM        * Cellulitis  Assessment & Plan  · Patient presented with worsening right lower extremity swelling with small blister on dorsal part of his right foot  · prior wound cultures positive for MRSA and Proteus  · X-ray R foot: No radiographic evidence of osteomyelitis or soft tissue air  · Procalcitonin 0 17  · Blood cultures NGTD  · Patient was initially on IV vancomycin and cefepime, was transitioned levofloxacin and clindamycin and has received 7 days of antibiotic coverage  · Discontinue antibiotics on discharge  · To follow up with his primary care physician    DORA (acute kidney injury) [facility])  Assessment & Plan  DORA resolved, creatinine 1 29 today    Mass of psoas muscle  Assessment & Plan  -incidental finding of benign psoas muscle mass noted on ERCP  CT abdomen pelvis with contrast  - 4 4 cm left external iliac chain mass which corresponds to the MRI finding seen on October 31, 9933 is almost certainly benign, as the finding has only slightly increased in size from 2 9 cm in July 2010  Scot Shames the presence of numerous fatty infiltrated iliac chain and to a lesser degree inguinal lymph nodes, 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 and possibly some associated fat necrosis  Large hiatal hernia containing the entirety of the gastric fundus within the hernia sac  Small left greater than right costophrenic angle loculated effusions  No acute interventions  No fevers, cough or acute signs of infection    Elevated troponin  Assessment & Plan  · EKG with AFib no acute ST changes  · troponins indeterminate (non-MI trop elevation)     Transaminitis  Assessment & Plan  -DDx includes R-sided CHF, SE of Velcade  -MRCP: Examination significantly limited by body habitus  Yang Bertha is no gross choledocholithiasis   CBD normal in diameter  Cholelithiasis with filling defects noted in the gallbladder neck   No surrounding inflammation  Cardiomegaly with small bilateral pleural effusions  Partially imaged masslike lesion anterior to the left psoas muscle as seen on images 5/16 and 1402/95 measuring approximately 4 5 x 2 5 cm  This could represent adenopathy or other mass   CT scan of the abdomen and pelvis with contrast recommended  -RUQ U/S: Evidence of gallstone with borderline wall thickening  The common duct is normal in caliber but small stone within the duct is possible versus adjacent fat  Direct biliary imaging would be useful for further assessment especially given transaminitis        Plan  · Trend CMP - Trend ALP and T luciana  · Mildly elevated GGT results  · No acute intervention given unremarkable imaging findings  · GI following    Weakness  Assessment & Plan  · PT/OT recommends inpatient rehab  · Patient is medically stable for rehab and is awaiting prior authorization approval from his insurance company    Multiple myeloma not having achieved remission ([facility] [address] )  Assessment & Plan  · Recently diagnosed 09/2019  · Patient was scheduled for Velcade on day of admission however due to weakness presented to the emergency room  · Outpatient Oncology follow-up    Moderate persistent asthma without complication  Assessment & Plan  · Without acute exacerbation  · Continue home Advair    SOB (shortness of breath)  Assessment & Plan  · Shortness of breath on exertion, progressively worsening prior to admission, multifactorial, secondary to acute CHF, morbid obesity and NALLELY  · CTA negative for PE  · lower extremity edema improving  · Remains on intravenous Lasix  · Saturations 97% on room air, keep O2 saturation greater than 92%    Hyperlipidemia  Assessment & Plan  · Continue home statin    Acute on chronic diastolic congestive heart failure (HCC)  Assessment & Plan  Wt Readings from Last 3 Encounters:   11/05/19 (!) 159 kg (349 lb 14 4 oz)   10/20/19 (!) 159 kg (350 lb 8 5 oz)   10/15/19 (!) 159 kg (351 lb 6 6 oz)       · Prior echo with an EF of 26% grade 3 diastolic dysfunction  · proBNP 2827 on admisson  · Currently on 40 mg IV Lasix 3 times daily, switch home torsemide dosage 10mg BID  · Medically stable for discharge, awaiting prior authorization approval from his insurance  · Weight remains stable, 349 lb today  · Daily weights  · Net -1700 cc in the last 24 hours  · Strict I&O  · Goal net neg 1L/24hrs        Morbid obesity ([facility] )  Assessment & Plan  · Dietary education and counseling      Chronic atrial fibrillation  Assessment & Plan  · Continue home beta-blocker/Coumadin  · Follow INR daily  · INR 2 3 today    Diabetes mellitus type 2, uncontrolled ([facility] )  Assessment & Plan  Lab Results   Component Value Date    HGBA1C 9 4 (H) 12/27/2018       Recent Labs     11/04/19  1603 11/04/19  2114 11/05/19  0713 11/05/19  1210   POCGLU 97 126 87 90       Blood Sugar Average: Last 72 hrs:  · (P) 127 [id number]   · continue Lantus/sliding-scale insulin  · Blood sugars remained stable and well controlled ranging between   · Hold home metformin        Discharging Physician / Practitioner: Ziggy Hinson MD  PCP: Aleks Dalton MD  Admission Date:   Admission Orders (From admission, onward)     Ordered        10/29/19 1906  Inpatient Admission (expected length of stay for this patient Order details is greater than two midnights)  Once                   Discharge Date: 11/05/19    Resolved Problems  Date Reviewed: 11/5/2019    None          Consultations During Hospital Stay:  · Infectious disease    Procedures Performed:   · None    Significant Findings / Test Results:   · CT abdomen/pelvis    FINDINGS:    ABDOMEN    LOWER CHEST:  Large hiatal hernia is noted containing the entirety of the gastric fundus   There is consolidative airspace opacity at the left lung base suspicious for left lower lobe pneumonia   There are small loculated costophrenic angle pleural   effusions bilaterally, larger on the left on the right  LIVER/BILIARY TREE: Tiffanie Alicia is a cyst in the right hepatic lobe, approximately 11 mm   No solid hepatic mass   Normal hepatic contours   No biliary dilatation   Patent portal vein  GALLBLADDER:  Calcified small stones noted in the gallbladder lumen   No gallbladder wall thickening or pedicle cystic inflammatory edema  SPLEEN:  Unremarkable  PANCREAS:  Punctate calcific density in the pancreatic head is larger when compared with July 10, 2010 measuring up to 7 mm, but is of unlikely clinical significance   No solid pancreatic mass or pancreatic ductal enlargement  ADRENAL GLANDS:  Unremarkable  KIDNEYS/URETERS:  Unremarkable  No hydronephrosis  STOMACH AND BOWEL:  Unremarkable  APPENDIX:  No findings to suggest appendicitis  ABDOMINOPELVIC CAVITY: Tiffanie Alicia is central fatty density and an intermediate density peripheral rind associated with a 4 4 cm left common iliac chain region mass, increased in size from 2 9 cm in July 2010 but similar in morphology when compared to that   remote prior examination   There are additional fatty densities along the external iliac chains bilaterally with an appearance that is most consistent with profound fatty infiltration of the glynn of multiple iliac chain nodes  VESSELS:  Unremarkable for patient's age  PELVIS    REPRODUCTIVE ORGANS:  Unremarkable for patient's age  URINARY BLADDER: Scattered bladder diverticula are noted   No CT evidence of bladder wall mass  ABDOMINAL WALL/INGUINAL REGIONS: Tiffanie Alicia is prominence of fatty glynn of inguinal lymph nodes bilaterally   No suspicious lymphadenopathy seen  OSSEOUS STRUCTURES:  No acute fracture or destructive osseous lesion     Impression:       A 4 4 cm left external iliac chain mass which corresponds to the MRI finding seen on October 31, 4805 is almost certainly benign, as the finding has only slightly increased in size from 2 9 cm in July 2010  Kiko Maddenr the presence of numerous fatty   infiltrated iliac chain and to a lesser degree inguinal lymph nodes, 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 and possibly some associated fat necrosis  Large hiatal hernia containing the entirety of the gastric fundus within the hernia sac       Left basilar airspace opacity suspicious for left lower lobe pneumonia   Small left greater than right costophrenic angle loculated effusions  Incidental Findings:   · Benign iliac chain mass     Test Results Pending at Discharge (will require follow up): · None     Outpatient Tests Requested:  · None    Complications:  None    Reason for Admission: Weakness    Hospital Course:     Rebeca Maguire is a 81 y o  male patient with PMH multiple myeloma, type 2 DM, left AKA and AFib on Coumadin who originally presented to the hospital on 10/29/2019 due to weakness and inability to ambulate  On presentation he was found to be dyspneic and was started on oxygen  He also had right lower extremity cellulitis and was started on intravenous antibiotics  He was also found to be in fluid overload and was given intravenous Lasix and has been diuresing effectively  Patient gradually improved, is saturating 97% on room air and received 7days of antibiotic coverage with negative blood cultures and  and is medically stable for discharge  Please see above list of diagnoses and related plan for additional information       Condition at Discharge: stable     Discharge Day Visit / Exam:     Subjective:  No overnight events  Vitals: Blood Pressure: 148/78 (11/05/19 0840)  Pulse: 80 (11/05/19 0840)  Temperature: 98 9 °F (37 2 °C) (11/05/19 0840)  Temp Source: Oral (11/05/19 0840)  Respirations: 18 (11/05/19 0840)  Height: 6' 3" (190 5 cm) (10/29/19 2037)  Weight - Scale: (!) 159 kg (349 lb 14 4 oz) (11/05/19 0600)  SpO2: 91 % (11/05/19 200)  Exam:   Physical Exam   Constitutional: He is oriented to person, place, and time  Cardiovascular: Normal rate and regular rhythm  Murmur heard  Pulmonary/Chest: Effort normal and breath sounds normal  No stridor  No respiratory distress  Abdominal: Soft  Bowel sounds are normal  He exhibits no distension  There is no tenderness  Neurological: He is alert and oriented to person, place, and time  Skin: There is erythema (Right lower extremity erythema with chronic venous stasis dermatitis)  Vitals reviewed  Discussion with Family: I updated patient's significant other on plan of care and discharge planning  Discharge instructions/Information to patient and family:   See after visit summary for information provided to patient and family  Provisions for Follow-Up Care:  See after visit summary for information related to follow-up care and any pertinent home health orders  Disposition:     Acute Rehab at [facility]    For Discharges to [facility]F:   · Not Applicable to this Patient - Not Applicable to this Patient    Planned Readmission:  No     Discharge Statement:  I spent 40 minutes discharging the patient  This time was spent on the day of discharge  I had direct contact with the patient on the day of discharge  Greater than 50% of the total time was spent examining patient, answering all patient questions, arranging and discussing plan of care with patient as well as directly providing post-discharge instructions  Additional time then spent on discharge activities  Discharge Medications:  See after visit summary for reconciled discharge medications provided to patient and family        ** Please Note: This note has been constructed using a voice recognition system **

## 2019-11-05 NOTE — TRANSPORTATION MEDICAL NECESSITY
Section I - General Information    Name of Patient: Shahab Taveras                 : 1950    Medicare #: [id number]  Transport Date: 19 (PCS is valid for round trips on this date and for all repetitive trips in the 60-day range as noted below )  Origin: [place]: [address] [facility]  Is the pt's stay covered under Medicare Part A (PPS/DRG)   []     Closest appropriate facility? If no, why is transport to more distant facility required? Yes  If hospice pt, is this transport related to pt's terminal illness? NA       Section II - Medical Necessity Questionnaire  Ambulance transportation is medically necessary only if other means of transport are contraindicated or would be potentially harmful to the patient  To meet this requirement, the patient must either be "bed confined" or suffer from a condition such that transport by means other than ambulance is contraindicated by the patient's condition  The following questions must be answered by the medical professional signing below for this form to be valid:    1)  Describe the MEDICAL CONDITION (physical and/or mental) of this patient AT [address] [place] [address] that requires the patient to be transported in an ambulance and why transport by other means is contraindicated by the patient's condition: Pt is mod assist x 2 for transfers, fall risk, impulsive, non ambulatory at this time, 2 person pivot transfers    2) Is the patient "bed confined" as defined below? Yes  To be "be confined" the patient must satisfy all three of the following conditions: (1) unable to get up from bed without Assistance; AND (2) unable to ambulate; AND (3) unable to sit in a chair or wheelchair  3) Can this patient safely be transported by car or wheelchair van (i e , seated during transport without a medical attendant or monitoring)?   No    4) In addition to completing questions 1-3 above, please check any of the following conditions that apply*:   *Note: supporting documentation for any boxes checked must be maintained in the patient's medical records  If hosp-hosp transfer, describe services needed at 2nd facility not available at 1st facility? Special handling/isolation/infection control precautions required   Unable to tolerate seated position for time needed to transport       Section III - Signature of Physician or Healthcare Professional  I certify that the above information is true and correct based on my evaluation of this patient, and represent that the patient requires transport by ambulance and that other forms of transport are contraindicated  I understand that this information will be used by the Centers for Medicare and Medicaid Services (CMS) to support the determination of medical necessity for ambulance services, and I represent that I have personal knowledge of the patient's condition at time of transport  []  If this box is checked, I also certify that the patient is physically or mentally incapable of signing the ambulance service's claim and that the institution with which I am affiliated has furnished care, services, or assistance to the patient  My signature below is made on behalf of the patient pursuant to 42 CFR §424 36(b)(4)   In accordance with 42 CFR §424 37, the specific reason(s) that the patient is physically or mentally incapable of signing the claim form is as follows:       Signature of Physician* or Healthcare Professional______________________________________________________________  Signature Date 11/05/19 (For scheduled repetitive transports, this form is not valid for transports performed more than 60 days after this date)    Printed Name & Credentials of Physician or Healthcare Professional (MD, DO, RN, etc )________________________________  *Form must be signed by patient's attending physician for scheduled, repetitive transports   For non-repetitive, unscheduled ambulance transports, if unable to obtain the signature of the attending physician, any of the following may sign (choose appropriate option below)  [] Physician Assistant []  Clinical Nurse Specialist []  Registered Nurse  []  Nurse Practitioner  [x] Discharge Planner

## 2019-11-05 NOTE — PLAN OF CARE
Problem: OCCUPATIONAL THERAPY ADULT  Goal: Performs self-care activities at highest level of function for planned discharge setting  See evaluation for individualized goals  Outcome: Progressing  Note:   Limitation: Decreased ADL status, Decreased Safe judgement during ADL, Decreased endurance, Decreased self-care trans  Prognosis: Fair  Assessment: Arrived to patient supine in bed agreeable to therapy  Agitated throughout session especially w/ mobility, requesting to use bathroom  however states "I can't walk to the bathroom from my bed"  BSC brought in, completed bed mobility w/ min A x1  Donned hospital gown (patient naked in bed on arrival) with min A  Patient requiring max A for donning socks at this time  Impulsive and requires cues for pacing, RW management and hand placement  Completed transfers w/ mod A x2 throughout session  At this time patient appears self limiting and presents with decreased insight into deficits and safety awareness and continues to state "I can do this so much better without anyone's help"  OT provided encouragement and support  Patient completed toileting w/ max A (x2 physical assist in standing + x1 A for hygiene management) - able to tolerate 1 min 30 sec of standing prior to fatigue  At end of session, patient remains seated upright in manual wheelchair with all needs in reach  Patient to continue to benefit from acute OT services while in the hospital to address remaining deficits       OT Discharge Recommendation: Short Term Rehab  OT - OK to Discharge: Yes(to rehab when medically stable)

## 2019-11-05 NOTE — ASSESSMENT & PLAN NOTE
Wt Readings from Last 3 Encounters:   11/05/19 (!) 159 kg (349 lb 14 4 oz)   10/20/19 (!) 159 kg (350 lb 8 5 oz)   10/15/19 (!) 159 kg (351 lb 6 6 oz)       · Prior echo with an EF of 00% grade 3 diastolic dysfunction  · proBNP 2827 on admisson  · Currently on 40 mg IV Lasix 3 times daily, switch home torsemide dosage 10mg BID  · Medically stable for discharge, awaiting prior authorization approval from his insurance  · Weight remains stable, 349 lb today  · Daily weights  · Net -1700 cc in the last 24 hours  · Strict I&O  · Goal net neg 1L/24hrs

## 2019-11-05 NOTE — PLAN OF CARE
Problem: PHYSICAL THERAPY ADULT  Goal: Performs mobility at highest level of function for planned discharge setting  See evaluation for individualized goals  Description  Treatment/Interventions: ADL retraining, Functional transfer training, LE strengthening/ROM, Patient/family training, Endurance training, Therapeutic exercise  Equipment Recommended: Wheelchair       See flowsheet documentation for full assessment, interventions and recommendations  Outcome: Progressing  Note:   Prognosis: Fair  Problem List: Decreased strength, Decreased endurance, Impaired balance, Decreased mobility, Decreased safety awareness, Impaired judgement, Obesity, Impaired sensation, Decreased skin integrity, Pain  Assessment: Patient received supine in bed  Stated that he needed to use the commode  Required assist of two for transfers  Patient demonstrates self limiting behavior and needs to be re-directed and encouraged to participate  He is also impulsive stating that he can do it alone despite needing assistance of 2  Decreased endurance and activity tolerance  Fatigues easily requiring rest breaks  He will require rehab at discharge  Barriers to Discharge: Inaccessible home environment, Decreased caregiver support     Recommendation: Short-term skilled PT     PT - OK to Discharge: No    See flowsheet documentation for full assessment

## 2019-11-05 NOTE — ASSESSMENT & PLAN NOTE
-DDx includes R-sided CHF, SE of Velcade  -MRCP: Examination significantly limited by body habitus  Cecilia Menezes is no gross choledocholithiasis   CBD normal in diameter  Cholelithiasis with filling defects noted in the gallbladder neck   No surrounding inflammation  Cardiomegaly with small bilateral pleural effusions  Partially imaged masslike lesion anterior to the left psoas muscle as seen on images 5/16 and 1402/95 measuring approximately 4 5 x 2 5 cm  This could represent adenopathy or other mass   CT scan of the abdomen and pelvis with contrast recommended  -RUQ U/S: Evidence of gallstone with borderline wall thickening  The common duct is normal in caliber but small stone within the duct is possible versus adjacent fat  Direct biliary imaging would be useful for further assessment especially given transaminitis        Plan  · Trend CMP - Trend ALP and T luciana  · Mildly elevated GGT results  · No acute intervention given unremarkable imaging findings  · GI following

## 2019-11-05 NOTE — ASSESSMENT & PLAN NOTE
· Patient presented with worsening right lower extremity swelling with small blister on dorsal part of his right foot  · prior wound cultures positive for MRSA and Proteus  · X-ray R foot: No radiographic evidence of osteomyelitis or soft tissue air    · Procalcitonin 0 17  · Blood cultures NGTD  · Patient was initially on IV vancomycin and cefepime, was transitioned levofloxacin and clindamycin and has received 7 days of antibiotic coverage  · Discontinue antibiotics on discharge  · To follow up with his primary care physician

## 2019-11-05 NOTE — PLAN OF CARE
Problem: DISCHARGE PLANNING - CARE MANAGEMENT  Goal: Discharge to post-acute care or home with appropriate resources  Description  INTERVENTIONS:  - Conduct assessment to determine patient/family and health care team treatment goals, and need for post-acute services based on payer coverage, community resources, and patient preferences, and barriers to discharge  - Address psychosocial, clinical, and financial barriers to discharge as identified in assessment in conjunction with the patient/family and health care team  - Arrange appropriate level of post-acute services according to patient's   needs and preference and payer coverage in collaboration with the physician and health care team  - Communicate with and update the patient/family, physician, and health care team regarding progress on the discharge plan  - Arrange appropriate transportation to post-acute venues   Outcome: Progressing     Problem: Nutrition/Hydration-ADULT  Goal: Nutrient/Hydration intake appropriate for improving, restoring or maintaining nutritional needs  Description  Monitor and assess patient's nutrition/hydration status for malnutrition  Collaborate with interdisciplinary team and initiate plan and interventions as ordered  Monitor patient's weight and dietary intake as ordered or per policy  Utilize nutrition screening tool and intervene as necessary  Determine patient's food preferences and provide high-protein, high-caloric foods as appropriate       INTERVENTIONS:  - Monitor oral intake, urinary output, labs, and treatment plans  - Assess nutrition and hydration status and recommend course of action  - Evaluate amount of meals eaten  - Assist patient with eating if necessary   - Allow adequate time for meals  - Recommend/ encourage appropriate diets, oral nutritional supplements, and vitamin/mineral supplements  - Order, calculate, and assess calorie counts as needed  - Recommend, monitor, and adjust tube feedings and TPN/PPN based on assessed needs  - Assess need for intravenous fluids  - Provide specific nutrition/hydration education as appropriate  - Include patient/family/caregiver in decisions related to nutrition  Outcome: Progressing     Problem: PAIN - ADULT  Goal: Verbalizes/displays adequate comfort level or baseline comfort level  Description  Interventions:  - Encourage patient to monitor pain and request assistance  - Assess pain using appropriate pain scale  - Administer analgesics based on type and severity of pain and evaluate response  - Implement non-pharmacological measures as appropriate and evaluate response  - Consider cultural and social influences on pain and pain management  - Notify physician/advanced practitioner if interventions unsuccessful or patient reports new pain  Outcome: Progressing     Problem: INFECTION - ADULT  Goal: Absence or prevention of progression during hospitalization  Description  INTERVENTIONS:  - Assess and monitor for signs and symptoms of infection  - Monitor lab/diagnostic results  - Monitor all insertion sites, i e  indwelling lines, tubes, and drains  - Monitor endotracheal if appropriate and nasal secretions for changes in amount and color  - [place] appropriate cooling/warming therapies per order  - Administer medications as ordered  - Instruct and encourage patient and family to use good hand hygiene technique  - Identify and instruct in appropriate isolation precautions for identified infection/condition  Outcome: Progressing     Problem: SAFETY ADULT  Goal: Patient will remain free of falls  Description  INTERVENTIONS:  - Assess patient frequently for physical needs  -  Identify cognitive and physical deficits and behaviors that affect risk of falls    -  [place] fall precautions as indicated by assessment   - Educate patient/family on patient safety including physical limitations  - Instruct patient to call for assistance with activity based on assessment  - Modify environment to reduce risk of injury  - Consider OT/PT consult to assist with strengthening/mobility  Outcome: Progressing  Goal: Maintain or return to baseline ADL function  Description  INTERVENTIONS:  -  Assess patient's ability to carry out ADLs; assess patient's baseline for ADL function and identify physical deficits which impact ability to perform ADLs (bathing, care of mouth/teeth, toileting, grooming, dressing, etc )  - Assess/evaluate cause of self-care deficits   - Assess range of motion  - Assess patient's mobility; develop plan if impaired  - Assess patient's need for assistive devices and provide as appropriate  - Encourage maximum independence but intervene and supervise when necessary  - Involve family in performance of ADLs  - Assess for home care needs following discharge   - Consider OT consult to assist with ADL evaluation and planning for discharge  - Provide patient education as appropriate  Outcome: Progressing  Goal: Maintain or return mobility status to optimal level  Description  INTERVENTIONS:  - Assess patient's baseline mobility status (ambulation, transfers, stairs, etc )    - Identify cognitive and physical deficits and behaviors that affect mobility  - Identify mobility aids required to assist with transfers and/or ambulation (gait belt, sit-to-stand, lift, walker, cane, etc )  - [place] fall precautions as indicated by assessment  - Record patient progress and toleration of activity level on Mobility SBAR; progress patient to next Phase/Stage  - Instruct patient to call for assistance with activity based on assessment  - Consider rehabilitation consult to assist with strengthening/weightbearing, etc   Outcome: Progressing     Problem: DISCHARGE PLANNING  Goal: Discharge to home or other facility with appropriate resources  Description  INTERVENTIONS:  - Identify barriers to discharge w/patient and caregiver  - Arrange for needed discharge resources and transportation as appropriate  - Identify discharge learning needs (meds, wound care, etc )  - Arrange for interpretive services to assist at discharge as needed  - Refer to Case Management Department for coordinating discharge planning if the patient needs post-hospital services based on physician/advanced practitioner order or complex needs related to functional status, cognitive ability, or social support system  Outcome: Progressing     Problem: Knowledge Deficit  Goal: Patient/family/caregiver demonstrates understanding of disease process, treatment plan, medications, and discharge instructions  Description  Complete learning assessment and assess knowledge base  Interventions:  - Provide teaching at level of understanding  - Provide teaching via preferred learning methods  Outcome: Progressing     Problem: Prexisting or High Potential for Compromised Skin Integrity  Goal: Skin integrity is maintained or improved  Description  INTERVENTIONS:  - Identify patients at risk for skin breakdown  - Assess and monitor skin integrity  - Assess and monitor nutrition and hydration status  - Monitor labs   - Assess for incontinence   - Turn and reposition patient  - Assist with mobility/ambulation  - Relieve pressure over bony prominences  - Avoid friction and shearing  - Provide appropriate hygiene as needed including keeping skin clean and dry  - Evaluate need for skin moisturizer/barrier cream  - Collaborate with interdisciplinary team   - Patient/family teaching  - Consider wound care consult   Outcome: Progressing     Problem: Potential for Falls  Goal: Patient will remain free of falls  Description  INTERVENTIONS:  - Assess patient frequently for physical needs  -  Identify cognitive and physical deficits and behaviors that affect risk of falls    -  [place] fall precautions as indicated by assessment   - Educate patient/family on patient safety including physical limitations  - Instruct patient to call for assistance with activity based on assessment  - Modify environment to reduce risk of injury  - Consider OT/PT consult to assist with strengthening/mobility  Outcome: Progressing

## 2019-11-05 NOTE — SOCIAL WORK
Continue to follow  Late Entry for 11/4/19  [facility] does not have bed available for Pt  Met with Pt   informed Pt that [facility] does not have bed available  Freedom of Choice given for backup SNF  Pt choosing [facility]  Referral sent to [facility] via [facility]  Await determination  Entry for today, 11/5  Call placed to Amilcar Spaulding in admissions at [facility], information being reviewed  Await determination  Call placed to Pt's son([phone]),  informed Pt's son that [facility] does not have bed availability and referral was sent to [facility]  Await determination

## 2019-11-05 NOTE — ASSESSMENT & PLAN NOTE
· PT/OT recommends inpatient rehab  · Patient is medically stable for rehab and is awaiting prior authorization approval from his insurance company

## 2019-11-06 ENCOUNTER — TRANSITIONAL CARE MANAGEMENT (OUTPATIENT)
Dept: FAMILY MEDICINE CLINIC | Facility: CLINIC | Age: 69
End: 2019-11-06

## 2019-11-07 NOTE — ED ATTENDING ATTESTATION
10/20/2019  I, Clay Zhu MD, saw and evaluated the patient  I have discussed the patient with the resident/non-physician practitioner and agree with the resident's/non-physician practitioner's findings, Plan of Care, and MDM as documented in the resident's/non-physician practitioner's note, except where noted  All available labs and Radiology studies were reviewed  I was present for key portions of any procedure(s) performed by the resident/non-physician practitioner and I was immediately available to provide assistance  At this point I agree with the current assessment done in the Emergency Department  I have conducted an independent evaluation of this patient a history and physical is as follows:    ED Course     Patient presents for evaluation due to pain in his left stump after a fall directly onto it on Friday  Patient has a left AKA  He denies any additional injuries or complaints  Patient does report that the pain has been worsening and has extended into his left groin  No additional complaints  Exam: AAOx3, NAD erythema of left stump with wound intact, tenderness, no motor/sensory deficits  A/P:  Left stump pain    Will check x-ray of hip and femur    Critical Care Time  Procedures

## 2019-11-08 ENCOUNTER — TELEPHONE (OUTPATIENT)
Dept: HEMATOLOGY ONCOLOGY | Facility: CLINIC | Age: 69
End: 2019-11-08

## 2019-11-08 NOTE — TELEPHONE ENCOUNTER
Willi Munoz from [facility] [place] [place] called to cancel the patient's 11 /11 10:30 am 1m/fu with Macrina Mccann due to him being inpatient  I did advise the patient is scheduled for infusion treatment 11/12   Any further question Willi Munoz can be reached at [phone]

## 2019-11-12 ENCOUNTER — HOSPITAL ENCOUNTER (OUTPATIENT)
Dept: INFUSION CENTER | Facility: HOSPITAL | Age: 69
Discharge: HOME | End: 2019-11-12
Attending: INTERNAL MEDICINE

## 2019-11-13 ENCOUNTER — TELEPHONE (OUTPATIENT)
Dept: NEPHROLOGY | Facility: CLINIC | Age: 69
End: 2019-11-13

## 2019-11-18 ENCOUNTER — TELEPHONE (OUTPATIENT)
Dept: HEMATOLOGY ONCOLOGY | Facility: CLINIC | Age: 69
End: 2019-11-18

## 2019-11-18 NOTE — TELEPHONE ENCOUNTER
Patient girlfriends Noam Marroquin called in to reschedule appointment as he at [facility]  Noam Marroquin also requested that we fax over the scripts for labs to [facility] at [phone]

## 2019-11-18 NOTE — TELEPHONE ENCOUNTER
That's fine  He should be made aware that I believe he can be treated even though at rehab  That choice is up to him based on need and if he has transport

## 2019-11-18 NOTE — TELEPHONE ENCOUNTER
Returned patients girl friend Aniyah's phone call  Requested a return call  I left a message indicating my thought of canceling his follow up scheduled 11/20 for a later date once he is discharged from rehab as the labs would not be available for the provider to review  He is also able to get his infusion if he wants to and has transportation available

## 2019-11-18 NOTE — TELEPHONE ENCOUNTER
Spoke with patient and daughter Orlando Avalos, Informed of appointment with Dr Candida Adorno 11/20 and instructed patient to get blood work done prior to the appointment (his blood work is NOT being done at the facility he is currently) Patient and daughter verbalized understanding

## 2019-11-18 NOTE — TELEPHONE ENCOUNTER
He is currently scheduled for tomorrow  His was inpatient and held last tx so his schedule is out of whack now  Should I cancel this one as well have him come in and rework his tx appointments?   Let me know thanks

## 2019-11-19 ENCOUNTER — HOSPITAL ENCOUNTER (OUTPATIENT)
Dept: INFUSION CENTER | Facility: HOSPITAL | Age: 69
End: 2019-11-19
Attending: INTERNAL MEDICINE

## 2019-11-20 NOTE — PROGRESS NOTES
11/20/19, reviewed chart, noted patient was discharged from hospital on 11/5 /19 to [facility] for rehab  kathya

## 2019-11-29 ENCOUNTER — TELEPHONE (OUTPATIENT)
Dept: FAMILY MEDICINE CLINIC | Facility: CLINIC | Age: 69
End: 2019-11-29

## 2019-11-29 NOTE — TELEPHONE ENCOUNTER
PT HAS [employer] INSURANCE AND THEY STATED THAT AS LONG AS PATIENT GETS PHYSICAL THERAPY IN THEIR NETWORK IT WILL BE COVERED AT $10 A VISIT IN HIS NURSING HOME ROOM  IS THERE ANY WAY WE KNOW WHICH PT PLACE WILL BE COVERED FOR HIM? PLEASE ADVISE  THANK YOU

## 2019-12-02 ENCOUNTER — ANTICOAG VISIT (OUTPATIENT)
Dept: CARDIOLOGY CLINIC | Facility: CLINIC | Age: 69
End: 2019-12-02

## 2019-12-02 ENCOUNTER — TELEPHONE (OUTPATIENT)
Dept: HEMATOLOGY ONCOLOGY | Facility: HOSPITAL | Age: 69
End: 2019-12-02

## 2019-12-02 ENCOUNTER — APPOINTMENT (OUTPATIENT)
Dept: LAB | Facility: HOSPITAL | Age: 69
End: 2019-12-02
Attending: INTERNAL MEDICINE
Payer: COMMERCIAL

## 2019-12-02 ENCOUNTER — OFFICE VISIT (OUTPATIENT)
Dept: HEMATOLOGY ONCOLOGY | Facility: HOSPITAL | Age: 69
End: 2019-12-02
Payer: COMMERCIAL

## 2019-12-02 VITALS
RESPIRATION RATE: 16 BRPM | DIASTOLIC BLOOD PRESSURE: 64 MMHG | SYSTOLIC BLOOD PRESSURE: 142 MMHG | OXYGEN SATURATION: 97 % | HEART RATE: 72 BPM | TEMPERATURE: 97.8 F

## 2019-12-02 DIAGNOSIS — C90.00 MULTIPLE MYELOMA NOT HAVING ACHIEVED REMISSION (HCC): Primary | ICD-10-CM

## 2019-12-02 DIAGNOSIS — I48.20 CHRONIC ATRIAL FIBRILLATION (HCC): ICD-10-CM

## 2019-12-02 LAB
ALBUMIN SERPL BCP-MCNC: 3.7 G/DL (ref 3.5–5)
ALP SERPL-CCNC: 200 U/L (ref 46–116)
ALT SERPL W P-5'-P-CCNC: 17 U/L (ref 12–78)
ANION GAP SERPL CALCULATED.3IONS-SCNC: 6 MMOL/L (ref 4–13)
AST SERPL W P-5'-P-CCNC: 15 U/L (ref 5–45)
BASOPHILS # BLD AUTO: 0.1 THOUSANDS/ΜL (ref 0–0.1)
BASOPHILS NFR BLD AUTO: 2 % (ref 0–1)
BILIRUB SERPL-MCNC: 0.6 MG/DL (ref 0.2–1)
BUN SERPL-MCNC: 29 MG/DL (ref 5–25)
CALCIUM SERPL-MCNC: 9.7 MG/DL (ref 8.3–10.1)
CHLORIDE SERPL-SCNC: 110 MMOL/L (ref 100–108)
CO2 SERPL-SCNC: 28 MMOL/L (ref 21–32)
CREAT SERPL-MCNC: 1.1 MG/DL (ref 0.6–1.3)
EOSINOPHIL # BLD AUTO: 0.33 THOUSAND/ΜL (ref 0–0.61)
EOSINOPHIL NFR BLD AUTO: 5 % (ref 0–6)
ERYTHROCYTE [DISTWIDTH] IN BLOOD BY AUTOMATED COUNT: 16.2 % (ref 11.6–15.1)
GFR SERPL CREATININE-BSD FRML MDRD: 69 ML/MIN/1.73SQ M
GLUCOSE SERPL-MCNC: 76 MG/DL (ref 65–140)
HCT VFR BLD AUTO: 42.6 % (ref 36.5–49.3)
HGB BLD-MCNC: 12.4 G/DL (ref 12–17)
IGA SERPL-MCNC: 513 MG/DL (ref 70–400)
IGG SERPL-MCNC: 788 MG/DL (ref 700–1600)
IGM SERPL-MCNC: 21 MG/DL (ref 40–230)
IMM GRANULOCYTES # BLD AUTO: 0.04 THOUSAND/UL (ref 0–0.2)
IMM GRANULOCYTES NFR BLD AUTO: 1 % (ref 0–2)
LYMPHOCYTES # BLD AUTO: 1.29 THOUSANDS/ΜL (ref 0.6–4.47)
LYMPHOCYTES NFR BLD AUTO: 20 % (ref 14–44)
MCH RBC QN AUTO: 27.7 PG (ref 26.8–34.3)
MCHC RBC AUTO-ENTMCNC: 29.1 G/DL (ref 31.4–37.4)
MCV RBC AUTO: 95 FL (ref 82–98)
MONOCYTES # BLD AUTO: 0.51 THOUSAND/ΜL (ref 0.17–1.22)
MONOCYTES NFR BLD AUTO: 8 % (ref 4–12)
NEUTROPHILS # BLD AUTO: 4.18 THOUSANDS/ΜL (ref 1.85–7.62)
NEUTS SEG NFR BLD AUTO: 64 % (ref 43–75)
NRBC BLD AUTO-RTO: 0 /100 WBCS
PLATELET # BLD AUTO: 244 THOUSANDS/UL (ref 149–390)
PMV BLD AUTO: 10.6 FL (ref 8.9–12.7)
POTASSIUM SERPL-SCNC: 4.3 MMOL/L (ref 3.5–5.3)
PROT SERPL-MCNC: 7.6 G/DL (ref 6.4–8.2)
RBC # BLD AUTO: 4.48 MILLION/UL (ref 3.88–5.62)
SODIUM SERPL-SCNC: 144 MMOL/L (ref 136–145)
WBC # BLD AUTO: 6.45 THOUSAND/UL (ref 4.31–10.16)

## 2019-12-02 PROCEDURE — 82784 ASSAY IGA/IGD/IGG/IGM EACH: CPT | Performed by: INTERNAL MEDICINE

## 2019-12-02 PROCEDURE — 80053 COMPREHEN METABOLIC PANEL: CPT | Performed by: INTERNAL MEDICINE

## 2019-12-02 PROCEDURE — 99214 OFFICE O/P EST MOD 30 MIN: CPT | Performed by: INTERNAL MEDICINE

## 2019-12-02 PROCEDURE — 83883 ASSAY NEPHELOMETRY NOT SPEC: CPT | Performed by: INTERNAL MEDICINE

## 2019-12-02 PROCEDURE — 84165 PROTEIN E-PHORESIS SERUM: CPT | Performed by: PATHOLOGY

## 2019-12-02 PROCEDURE — 84165 PROTEIN E-PHORESIS SERUM: CPT | Performed by: INTERNAL MEDICINE

## 2019-12-02 PROCEDURE — 85025 COMPLETE CBC W/AUTO DIFF WBC: CPT | Performed by: INTERNAL MEDICINE

## 2019-12-02 PROCEDURE — 82232 ASSAY OF BETA-2 PROTEIN: CPT | Performed by: INTERNAL MEDICINE

## 2019-12-02 NOTE — PROGRESS NOTES
Hematology/Oncology Outpatient Follow- up Note  Fantasma Dasilva 81 y o  male MRN: @ Encounter: [id number]        Date:  12/2/2019    Presenting Complaint/Diagnosis : Biclonal gammopathy with IgG Kappa  Bone marrow biopsy revealed multiple myeloma    HPI:    The patient was admitted to the hospital with wound infection  [facility] has a history of DM, HTN, and several other comorbid conditions   He seems to have had a slightly elevated creatinine for about the past year, seems to be slightly increasing more recently  He has also been anemic for quiet some time  Skeletal survey was negative but we did a bone marrow biopsy which showed plasma cell neoplasm consistent with myeloma so he was referred to see us  Previous Hematologic/ Oncologic History:       Multiple myeloma not having achieved remission ([facility] )    9/16/2019 Initial Diagnosis     Multiple myeloma not having achieved remission ([facility] )      9/24/2019 -  Chemotherapy     iron sucrose (VENOFER) injection 200 mg, 200 mg (100 % of original dose 200 mg), Intravenous, Once, 1 of 1 cycle  Dose modification: 200 mg (original dose 200 mg, Cycle 1, Reason: Other (See Comments))  Administration: 200 mg (9/24/2019)  bortezomib (VELCADE) subcutaneous injection 3 5 mg, 1 3 mg/m2 = 3 5 mg (81 3 % of original dose 1 6 mg/m2), Subcutaneous, Once, 1 of 6 cycles  Dose modification: 1 3 mg/m2 (original dose 1 6 mg/m2, Cycle 1, Reason: Dose Not Tolerated)  Administration: 3 5 mg (9/24/2019), 3 5 mg (10/1/2019), 3 5 mg (10/8/2019), 3 5 mg (10/15/2019)         Current Hematologic/ Oncologic Treatment:      Velcade and Decadron    Interval History:    The patient returns for follow-up visit  He was last treated in October  He was admitted to the hospital with leg swelling  He then went to rehabilitation  He is now back and his nursing facility  He states he is feeling at baseline  Denies any nausea denies any vomiting denies any diarrhea   His next dose of treatment was supposed to be tomorrow and we will see if we can get him back on schedule  This will be cycle #2  He has not had any myeloma blood work and his last CBC from the beginning of November showed you still anemic with hemoglobin around 9  The rest of his 14 point review of systems today was negative  Test Results:    Imaging: Ct Abdomen Pelvis W Contrast    Result Date: 11/4/2019  Narrative: CT ABDOMEN AND PELVIS WITH IV CONTRAST INDICATION:   Left psoas muscle mass  COMPARISON:  Abdominal MRI performed October 31, 2019  Abdomen pelvis CT performed July 10, 2010  TECHNIQUE:  CT examination of the abdomen and pelvis was performed  Axial, sagittal, and coronal 2D reformatted images were created from the source data and submitted for interpretation  Radiation dose length product (DLP) for this visit:  2724 21 mGy-cm   This examination, like all CT scans performed in the [facility], was performed utilizing techniques to minimize radiation dose exposure, including the use of iterative reconstruction and automated exposure control  IV Contrast:  100 mL of iohexol (OMNIPAQUE) Enteric Contrast:  Enteric contrast was not administered  FINDINGS: ABDOMEN LOWER CHEST:  Large hiatal hernia is noted containing the entirety of the gastric fundus  There is consolidative airspace opacity at the left lung base suspicious for left lower lobe pneumonia  There are small loculated costophrenic angle pleural effusions bilaterally, larger on the left on the right  LIVER/BILIARY TREE:  There is a cyst in the right hepatic lobe, approximately 11 mm  No solid hepatic mass  Normal hepatic contours  No biliary dilatation  Patent portal vein  GALLBLADDER:  Calcified small stones noted in the gallbladder lumen  No gallbladder wall thickening or pedicle cystic inflammatory edema  SPLEEN:  Unremarkable   PANCREAS:  Punctate calcific density in the pancreatic head is larger when compared with July 10, 2010 measuring up to 7 mm, but is of unlikely clinical significance  No solid pancreatic mass or pancreatic ductal enlargement  ADRENAL GLANDS:  Unremarkable  KIDNEYS/URETERS:  Unremarkable  No hydronephrosis  STOMACH AND BOWEL:  Unremarkable  APPENDIX:  No findings to suggest appendicitis  ABDOMINOPELVIC CAVITY:  There is central fatty density and an intermediate density peripheral rind associated with a 4 4 cm left common iliac chain region mass, increased in size from 2 9 cm in July 2010 but similar in morphology when compared to that remote prior examination  There are additional fatty densities along the external iliac chains bilaterally with an appearance that is most consistent with profound fatty infiltration of the glynn of multiple iliac chain nodes  VESSELS:  Unremarkable for patient's age  PELVIS REPRODUCTIVE ORGANS:  Unremarkable for patient's age  URINARY BLADDER: Scattered bladder diverticula are noted  No CT evidence of bladder wall mass  ABDOMINAL WALL/INGUINAL REGIONS:  There is prominence of fatty glynn of inguinal lymph nodes bilaterally  No suspicious lymphadenopathy seen  OSSEOUS STRUCTURES:  No acute fracture or destructive osseous lesion  Impression: A 4 4 cm left external iliac chain mass which corresponds to the MRI finding seen on October 31, 4952 is almost certainly benign, as the finding has only slightly increased in size from 2 9 cm in July 2010  Given the presence of numerous fatty infiltrated iliac chain and to a lesser degree inguinal lymph nodes, 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 and possibly some associated fat necrosis  Large hiatal hernia containing the entirety of the gastric fundus within the hernia sac  Left basilar airspace opacity suspicious for left lower lobe pneumonia  Small left greater than right costophrenic angle loculated effusions   Workstation performed: [id number]       Labs:   Lab Results   Component Value Date    WBC 6 40 11/05/2019    HGB 9 0 (L) 11/05/2019    HCT 30 7 (L) 11/05/2019    MCV 94 11/05/2019     11/05/2019     Lab Results   Component Value Date     01/15/2018    K 3 6 11/05/2019     11/05/2019    CO2 28 11/05/2019    ANIONGAP 11 12/22/2015    BUN 38 (H) 11/05/2019    CREATININE 1 29 11/05/2019    GLUCOSE 139 12/22/2015    GLUF 141 (H) 02/26/2019    CALCIUM 8 5 11/05/2019    AST 25 11/05/2019    ALT 23 11/05/2019    ALKPHOS 336 (H) 11/05/2019    PROT 7 1 01/15/2018    BILITOT 0 6 01/15/2018    EGFR 57 11/05/2019         Lab Results   Component Value Date    SPEP  08/04/2019     The SPEP shows a biclonal gammopathy  Immunofixation to be performed  Reviewed by: Rupa Valdez MD ([id number]) **Electronic Signature**    UPEP  08/04/2019     The UPEP shows non-selective proteinuria  The UPEP shows a monoclonal gammopathy  Immunofixation to be performed  Reviewed by: Nuria Sow MD **Electronic Signature**         Lab Results   Component Value Date    IRON 49 (L) 11/01/2019    TIBC 254 11/01/2019    FERRITIN 824 (H) 11/01/2019       ROS: As stated in the history of present illness otherwise his 14 point review of systems today was negative        Active Problems:   Patient Active Problem List   Diagnosis    Diabetic foot ulcer ([facility] )    Diabetes mellitus type 2, uncontrolled ([facility] )    Chronic venous hypertension, right    Essential hypertension    Chronic atrial fibrillation    Morbid obesity ([address] )    Acute on chronic diastolic congestive heart failure ([address] )    Cardiomyopathy ([address] )    Diabetes, polyneuropathy ([address] )    GERD (gastroesophageal reflux disease)    Hyperlipidemia    Onychomycosis    Gallstones    Blood alkaline phosphatase increased compared with prior measurement    Localized edema    Peripheral vascular disease (HCC)    SOB (shortness of breath)    NALLELY (obstructive sleep apnea)    Moderate persistent asthma without complication    Biclonal gammopathy    Multiple myeloma not having achieved remission ([place] [address] )    Above knee amputation of left lower extremity ([place] [address] )    Cellulitis    Hiatal hernia    Weakness    Transaminitis    Elevated troponin    Chronic obstructive pulmonary disease, unspecified (HCC)    Hypertensive chronic kidney disease w stg 1-4/unsp chr kdny    Mass of psoas muscle    DORA (acute kidney injury) ([place] [address] )       Past Medical History:   Past Medical History:   Diagnosis Date    Cancer ([place] [address] )     CHF (congestive heart failure) (HCC)     Chronic kidney disease     COPD [facility]chronic obstructive pulmonary disease) ([facility]     Decubitus ulcer of heel     LAST ASSESSED [id number]    Diabetes mellitus ([place] [address] )     History of varicose veins     Hypertension     Intermittent claudication (HCC)     Neuropathy     Seasonal allergies        Surgical History:   Past Surgical History:   Procedure Laterality Date    AMPUTATION ABOVE KNEE (AKA) Left 7/31/2019    Procedure: AMPUTATION ABOVE KNEE (AKA); Surgeon: Jasmyne Blanchard MD;  Location: [facility];  Service: General    ANGIOPLASTY      W/ FLUOROSC ANGIOGRAPGY PERIPHERAL ARTERY ADDITIONAL  LAST ASSESSED [id number]    ANGIOPLASTY / STENTING FEMORAL      TANSCATH INTRAVASCULAR STENT PLACEMENT PERCUTANEOUS FEMORAL     CT BONE MARROW BIOPSY AND ASPIRATION  8/9/2019    FULL THICKNESS SKIN GRAFT      TENDON REPAIR      TOE AMPUTATION Left 12/27/2018    Procedure: AMPUTATION left 4th TOE;  Surgeon: Francie Azar DPM;  Location: [facility];  Service: Podiatry       Family History:    Family History   Problem Relation Age of Onset    Other Mother         CARDIAC DISORDER     Diabetes Mother     Cancer Father     Other Family         CARDIAC DISORDER     Diabetes Family     Cancer Family     Mental illness Family     Kidney disease Sister        Cancer-related family history includes Cancer in his family and father      Social History:   Social History     Socioeconomic History    Marital status:      Spouse name: Not on file    Number of children: 1    Years of education: Not on file    Highest education level: Not on file   Occupational History    Occupation: RETIRED   Social Needs    Financial resource strain: Not on file    Food insecurity:     Worry: Not on file     Inability: Not on file    Transportation needs:     Medical: Not on file     Non-medical: Not on file   Tobacco Use    Smoking status: Never Smoker    Smokeless tobacco: Never Used   Substance and Sexual Activity    Alcohol use: Not Currently    Drug use: Not Currently    Sexual activity: Not Currently   Lifestyle    Physical activity:     Days per week: Not on file     Minutes per session: Not on file    Stress: Not on file   Relationships    Social connections:     Talks on phone: Not on file     Gets together: Not on file     Attends Sikh service: Not on file     Active member of club or organization: Not on file     Attends meetings of clubs or organizations: Not on file     Relationship status: Not on file    Intimate partner violence:     Fear of current or ex partner: Not on file     Emotionally abused: Not on file     Physically abused: Not on file     Forced sexual activity: Not on file   Other Topics Concern    Not on file   Social History Narrative    DENIED [address]    FEELS SAFE AT HOME    LIVES WITH FAMILY - SISTER    NO LIVING WILL    POA IN EXISTENCE     SUPPORTIVE AND SAFE    One son, 2 granddaughters       Current Medications:   Current Outpatient Medications   Medication Sig Dispense Refill    acetaminophen (TYLENOL) 500 mg tablet Take 1 tablet (500 mg total) by mouth every 6 (six) hours as needed for mild pain, headaches or fever 90 tablet 1    acyclovir (ZOVIRAX) 400 MG tablet Take 1 tablet (400 mg total) by mouth daily 30 tablet 3    albuterol (PROVENTIL HFA,VENTOLIN HFA) 90 mcg/act inhaler Inhale 2 puffs every 6 (six) hours as needed for wheezing 1 Inhaler 0    Alcohol Swabs (ALCOHOL PREP) 70 % PADS   0    allopurinol (ZYLOPRIM) 300 mg tablet TAKE 1 TABLET BY MOUTH DAILY 90 tablet 1    ammonium lactate (LAC-HYDRIN) 12 % lotion   99    atorvastatin (LIPITOR) 20 mg tablet TAKE 1 TABLET BY MOUTH ONCE DAILY 90 tablet 0    BD INSULIN SYRINGE U/F 31G X 5/16" 0 5 ML MISC USE AS DIRECTED WITH NOVOLIN 70/30  0    BD PEN NEEDLE HILARIO U/F 32G X 4 MM MISC by Other route 3 (three) times a day 300 each 1    bisacodyl (bisacodyl) 5 mg EC tablet Take 5 mg by mouth daily as needed for constipation      bisacodyl (DULCOLAX) 10 mg suppository Insert 10 mg into the rectum as needed for constipation      bortezomib (VELCADE) Infuse into a venous catheter once      clotrimazole (LOTRIMIN) 1 % cream Apply topically 2 (two) times a day 30 g 0    fluticasone-salmeterol (ADVAIR DISKUS, WIXELA INHUB) 250-50 mcg/dose inhaler Inhale 1 puff 2 (two) times a day Rinse mouth after use  1 Inhaler 5    insulin aspart (NOVOLOG FLEXPEN) 100 Units/mL injection pen Inject 30 Units under the skin 3 (three) times a day with meals 15 pen 1    insulin glargine (LANTUS) 100 units/mL subcutaneous injection Inject 18 Units under the skin daily at bedtime 10 mL 0    metFORMIN (GLUCOPHAGE) 1000 MG tablet       metoprolol tartrate (LOPRESSOR) 25 mg tablet TAKE 1 TABLET BY MOUTH EVERY 12 HOURS 180 tablet 3    torsemide (DEMADEX) 10 mg tablet Take 1 tablet (10 mg total) by mouth 2 (two) times a day      warfarin (COUMADIN) 7 5 mg tablet 7 5 mg on August 12th and 13th then INR on August 14th 30 tablet 0    Insulin Pen Needle 31G X 5 MM MISC by Does not apply route 2 (two) times a day for 50 days 100 each 0     No current facility-administered medications for this visit  Allergies: Allergies   Allergen Reactions    Latex Rash       Physical Exam:    There is no height or weight on file to calculate BSA      Wt Readings from Last 3 Encounters:   11/05/19 (!) 159 kg (349 lb 14 4 oz)   10/20/19 (!) 159 kg (350 lb 8 5 oz)   10/15/19 (!) 159 kg (351 lb 6 6 oz)        Temp Readings from Last 3 Encounters:   12/02/19 97 8 °F (36 6 °C) (Tympanic)   11/05/19 97 9 °F (36 6 °C) (Oral)   10/20/19 97 5 °F (36 4 °C) (Oral)        BP Readings from Last 3 Encounters:   12/02/19 142/64   11/05/19 133/63   10/20/19 151/70         Pulse Readings from Last 3 Encounters:   12/02/19 72   11/05/19 60   10/20/19 72         Physical Exam     Constitutional   General appearance: No acute distress, well appearing and well nourished  Eyes   Conjunctiva and lids: No swelling, erythema or discharge  Pupils and irises: Equal, round and reactive to light  Ears, Nose, Mouth, and Throat   External inspection of ears and nose: Normal     Nasal mucosa, septum, and turbinates: Normal without edema or erythema  Oropharynx: Normal with no erythema, edema, exudate or lesions  Pulmonary   Respiratory effort: No increased work of breathing or signs of respiratory distress  Auscultation of lungs: Clear to auscultation  Cardiovascular   Palpation of heart: Normal PMI, no thrills  Auscultation of heart: Normal rate and rhythm, normal S1 and S2, without murmurs  Examination of extremities for edema and/or varicosities: Normal     Carotid pulses: Normal     Abdomen   Abdomen: Non-tender, no masses  Liver and spleen: No hepatomegaly or splenomegaly  Lymphatic   Palpation of lymph nodes in neck: No lymphadenopathy  Musculoskeletal   Gait and station: In a wheelchair secondary to amputation of left lower extremity  Skin   Skin and subcutaneous tissue: Erythema with some dependent edema in right lower extremity  This seems to be secondary to dependent edema with some stasis changes because he is in a wheelchair  Neurologic   Cranial nerves: Cranial nerves 2-12 intact  Sensation: No sensory loss      Psychiatric   Orientation to person, place, and time: Normal     Mood and affect: Normal         Assessment / Plan:      The patient is a 51-year-old male with multiple myeloma  He does have multiple comorbidities including diabetes mellitus and hypertension  We were consulted in August 2019 while he was inpatient due to recent labs revealing by clonal gammopathies with IgG kappa  He also does have elevated kidney function and anemia which warranted a workup for multiple myeloma  His skeletal survey was negative, however his bone marrow biopsy did confirm multiple myeloma so he was started on treatment with Velcade and Decadron weekly on a 35 day schedule  Velcade is 1 3 milligrams/meter squared and Decadron is 20 mg p o  On days 1, 8, 15, 22  He got 1 cycle but then was in the hospital for lower extremity edema and other comorbidities  He was discharged and went to rehabilitation  He is now back at the nursing facility  I will reinitiate his treatment  He has not had any myeloma blood work and has actually not had a CBC with differential for the last month or CMP either  I will arrange all of this  We'll get blood work today which will be his myeloma blood work along with a CBC with differential and CMP  He will then start treatment tomorrow or next week depending on the infusion center scheduled  I'll see him back in 6-8 weeks  He will get myeloma blood work every 2 months  He will get Velcade and Decadron every week 4 weeks on and one-week off  This was as scheduled previously  He missed the whole cycle #2 so this will now be restarted from tomorrow IE 2 December or the week after depending on the infusion center scheduled  The patient and his  are in agreement with the plan  I'll see him back in 6-8 weeks  Goals and Barriers:  Current Goal:  Prolong Survival from Multiple myeloma  Barriers: None  Patient's Capacity to Self Care:  Patient able to self care      Portions of the record may have been created with voice recognition software   Occasional wrong word or "sound a like" substitutions may have occurred due to the inherent limitations of voice recognition software   Read the chart carefully and recognize, using context, where substitutions have occurred

## 2019-12-02 NOTE — TELEPHONE ENCOUNTER
Attempted to call Joanna Espino to let her know they need to call the insurance to find out who is participating  No answer and unable to leave message

## 2019-12-02 NOTE — PROGRESS NOTES
Spoke to nino pt was in a rehab  pts dosing was 7 5 daily upon return   Told to take 10 today then 10 on Wed  Others recheck 1 week  Spoke to [employer] Suki and faxed to Andreas Villegas

## 2019-12-02 NOTE — TELEPHONE ENCOUNTER
Pt's daughter called and stated that the patient could not make it to the infusion appt on 12/5 because the nursing home can not transport them and they could like to start next week 12/10  He is already scheduled

## 2019-12-02 NOTE — TELEPHONE ENCOUNTER
Sent email to infusion  to cancel infusion appt for 12/5  Also requested cycle adjusted accordingly

## 2019-12-03 LAB
ALBUMIN SERPL ELPH-MCNC: 3.86 G/DL (ref 3.5–5)
ALBUMIN SERPL ELPH-MCNC: 53.6 % (ref 52–65)
ALPHA1 GLOB SERPL ELPH-MCNC: 0.37 G/DL (ref 0.1–0.4)
ALPHA1 GLOB SERPL ELPH-MCNC: 5.2 % (ref 2.5–5)
ALPHA2 GLOB SERPL ELPH-MCNC: 0.88 G/DL (ref 0.4–1.2)
ALPHA2 GLOB SERPL ELPH-MCNC: 12.2 % (ref 7–13)
BETA GLOB ABNORMAL SERPL ELPH-MCNC: 0.53 G/DL (ref 0.4–0.8)
BETA1 GLOB SERPL ELPH-MCNC: 7.4 % (ref 5–13)
BETA2 GLOB SERPL ELPH-MCNC: 10.7 % (ref 2–8)
BETA2+GAMMA GLOB SERPL ELPH-MCNC: 0.77 G/DL (ref 0.2–0.5)
GAMMA GLOB ABNORMAL SERPL ELPH-MCNC: 0.78 G/DL (ref 0.5–1.6)
GAMMA GLOB SERPL ELPH-MCNC: 10.9 % (ref 12–22)
IGG/ALB SER: 1.16 {RATIO} (ref 1.1–1.8)
KAPPA LC FREE SER-MCNC: 142.4 MG/L (ref 3.3–19.4)
KAPPA LC FREE/LAMBDA FREE SER: 7.42 {RATIO} (ref 0.26–1.65)
LAMBDA LC FREE SERPL-MCNC: 19.2 MG/L (ref 5.7–26.3)
M PEAK ID 2: 1.1 %
M PROTEIN 1 MFR SERPL ELPH: 3 %
M PROTEIN 1 SERPL ELPH-MCNC: 0.22 G/DL
M PROTEIN 2 SERPL ELPH-MCNC: 0.08 G/DL
PROT PATTERN SERPL ELPH-IMP: ABNORMAL
PROT SERPL-MCNC: 7.2 G/DL (ref 6.4–8.2)

## 2019-12-04 ENCOUNTER — APPOINTMENT (EMERGENCY)
Dept: RADIOLOGY | Facility: HOSPITAL | Age: 69
DRG: 872 | End: 2019-12-04
Payer: COMMERCIAL

## 2019-12-04 ENCOUNTER — HOSPITAL ENCOUNTER (INPATIENT)
Facility: HOSPITAL | Age: 69
LOS: 6 days | Discharge: NURSING, REHAB OR LONG-TERM CARE | DRG: 872 | End: 2019-12-11
Attending: EMERGENCY MEDICINE | Admitting: INTERNAL MEDICINE
Payer: COMMERCIAL

## 2019-12-04 DIAGNOSIS — L03.115 CELLULITIS OF RIGHT LOWER EXTREMITY: Primary | ICD-10-CM

## 2019-12-04 DIAGNOSIS — L03.90 SEPSIS DUE TO CELLULITIS (HCC): ICD-10-CM

## 2019-12-04 DIAGNOSIS — E11.641 UNCONTROLLED TYPE 2 DIABETES MELLITUS WITH HYPOGLYCEMIA AND COMA (HCC): Chronic | ICD-10-CM

## 2019-12-04 DIAGNOSIS — R21 RASH: ICD-10-CM

## 2019-12-04 DIAGNOSIS — I87.301 CHRONIC VENOUS HYPERTENSION, RIGHT: ICD-10-CM

## 2019-12-04 DIAGNOSIS — A41.9 SEPSIS DUE TO CELLULITIS (HCC): ICD-10-CM

## 2019-12-04 LAB
B2 MICROGLOB SERPL-MCNC: 3.9 MG/L (ref 0.6–2.4)
BASOPHILS # BLD AUTO: 0.07 THOUSANDS/ΜL (ref 0–0.1)
BASOPHILS NFR BLD AUTO: 1 % (ref 0–1)
EOSINOPHIL # BLD AUTO: 0.05 THOUSAND/ΜL (ref 0–0.61)
EOSINOPHIL NFR BLD AUTO: 0 % (ref 0–6)
ERYTHROCYTE [DISTWIDTH] IN BLOOD BY AUTOMATED COUNT: 16.1 % (ref 11.6–15.1)
HCT VFR BLD AUTO: 38.4 % (ref 36.5–49.3)
HGB BLD-MCNC: 11.4 G/DL (ref 12–17)
IMM GRANULOCYTES # BLD AUTO: 0.08 THOUSAND/UL (ref 0–0.2)
IMM GRANULOCYTES NFR BLD AUTO: 1 % (ref 0–2)
LYMPHOCYTES # BLD AUTO: 1.43 THOUSANDS/ΜL (ref 0.6–4.47)
LYMPHOCYTES NFR BLD AUTO: 10 % (ref 14–44)
MCH RBC QN AUTO: 27.7 PG (ref 26.8–34.3)
MCHC RBC AUTO-ENTMCNC: 29.7 G/DL (ref 31.4–37.4)
MCV RBC AUTO: 93 FL (ref 82–98)
MONOCYTES # BLD AUTO: 1.05 THOUSAND/ΜL (ref 0.17–1.22)
MONOCYTES NFR BLD AUTO: 7 % (ref 4–12)
NEUTROPHILS # BLD AUTO: 11.59 THOUSANDS/ΜL (ref 1.85–7.62)
NEUTS SEG NFR BLD AUTO: 81 % (ref 43–75)
NRBC BLD AUTO-RTO: 0 /100 WBCS
PLATELET # BLD AUTO: 227 THOUSANDS/UL (ref 149–390)
PMV BLD AUTO: 10.8 FL (ref 8.9–12.7)
RBC # BLD AUTO: 4.12 MILLION/UL (ref 3.88–5.62)
WBC # BLD AUTO: 14.27 THOUSAND/UL (ref 4.31–10.16)

## 2019-12-04 PROCEDURE — 85730 THROMBOPLASTIN TIME PARTIAL: CPT | Performed by: EMERGENCY MEDICINE

## 2019-12-04 PROCEDURE — 85610 PROTHROMBIN TIME: CPT | Performed by: EMERGENCY MEDICINE

## 2019-12-04 PROCEDURE — 99285 EMERGENCY DEPT VISIT HI MDM: CPT

## 2019-12-04 PROCEDURE — 80053 COMPREHEN METABOLIC PANEL: CPT | Performed by: EMERGENCY MEDICINE

## 2019-12-04 PROCEDURE — 85025 COMPLETE CBC W/AUTO DIFF WBC: CPT | Performed by: EMERGENCY MEDICINE

## 2019-12-04 PROCEDURE — 84145 PROCALCITONIN (PCT): CPT | Performed by: EMERGENCY MEDICINE

## 2019-12-04 PROCEDURE — 71045 X-RAY EXAM CHEST 1 VIEW: CPT

## 2019-12-04 PROCEDURE — 87040 BLOOD CULTURE FOR BACTERIA: CPT | Performed by: EMERGENCY MEDICINE

## 2019-12-04 PROCEDURE — 96365 THER/PROPH/DIAG IV INF INIT: CPT

## 2019-12-04 PROCEDURE — 36415 COLL VENOUS BLD VENIPUNCTURE: CPT | Performed by: EMERGENCY MEDICINE

## 2019-12-04 PROCEDURE — 83605 ASSAY OF LACTIC ACID: CPT | Performed by: EMERGENCY MEDICINE

## 2019-12-04 PROCEDURE — 87147 CULTURE TYPE IMMUNOLOGIC: CPT | Performed by: EMERGENCY MEDICINE

## 2019-12-04 RX ORDER — SODIUM CHLORIDE 9 MG/ML
3 INJECTION INTRAVENOUS AS NEEDED
Status: DISCONTINUED | OUTPATIENT
Start: 2019-12-04 | End: 2019-12-11 | Stop reason: HOSPADM

## 2019-12-04 RX ADMIN — SODIUM CHLORIDE, SODIUM LACTATE, POTASSIUM CHLORIDE, AND CALCIUM CHLORIDE 500 ML: .6; .31; .03; .02 INJECTION, SOLUTION INTRAVENOUS at 23:45

## 2019-12-04 NOTE — TELEPHONE ENCOUNTER
Again attempted to notify Elvie Ades of need to contact insurance to determine coverage  Phone message states mailbox is full  Unable to leave message

## 2019-12-05 ENCOUNTER — HOSPITAL ENCOUNTER (OUTPATIENT)
Dept: INFUSION CENTER | Facility: HOSPITAL | Age: 69
End: 2019-12-05

## 2019-12-05 ENCOUNTER — APPOINTMENT (INPATIENT)
Dept: RADIOLOGY | Facility: HOSPITAL | Age: 69
DRG: 872 | End: 2019-12-05
Payer: COMMERCIAL

## 2019-12-05 PROBLEM — I50.32 CHRONIC DIASTOLIC (CONGESTIVE) HEART FAILURE (HCC): Chronic | Status: ACTIVE | Noted: 2019-12-05

## 2019-12-05 PROBLEM — A41.9 SEPSIS DUE TO CELLULITIS (HCC): Status: ACTIVE | Noted: 2019-09-23

## 2019-12-05 PROBLEM — N18.9 CKD (CHRONIC KIDNEY DISEASE): Status: ACTIVE | Noted: 2019-11-01

## 2019-12-05 LAB
ALBUMIN SERPL BCP-MCNC: 2.7 G/DL (ref 3.5–5)
ALBUMIN SERPL BCP-MCNC: 3.4 G/DL (ref 3.5–5)
ALP SERPL-CCNC: 223 U/L (ref 46–116)
ALP SERPL-CCNC: 259 U/L (ref 46–116)
ALT SERPL W P-5'-P-CCNC: 23 U/L (ref 12–78)
ALT SERPL W P-5'-P-CCNC: 25 U/L (ref 12–78)
ANION GAP SERPL CALCULATED.3IONS-SCNC: 6 MMOL/L (ref 4–13)
ANION GAP SERPL CALCULATED.3IONS-SCNC: 7 MMOL/L (ref 4–13)
APTT PPP: 53 SECONDS (ref 23–37)
AST SERPL W P-5'-P-CCNC: 30 U/L (ref 5–45)
AST SERPL W P-5'-P-CCNC: 36 U/L (ref 5–45)
BACTERIA UR QL AUTO: ABNORMAL /HPF
BASOPHILS # BLD AUTO: 0.07 THOUSANDS/ΜL (ref 0–0.1)
BASOPHILS NFR BLD AUTO: 1 % (ref 0–1)
BILIRUB SERPL-MCNC: 1.16 MG/DL (ref 0.2–1)
BILIRUB SERPL-MCNC: 1.26 MG/DL (ref 0.2–1)
BILIRUB UR QL STRIP: NEGATIVE
BUN SERPL-MCNC: 32 MG/DL (ref 5–25)
BUN SERPL-MCNC: 33 MG/DL (ref 5–25)
CALCIUM SERPL-MCNC: 8 MG/DL (ref 8.3–10.1)
CALCIUM SERPL-MCNC: 8.8 MG/DL (ref 8.3–10.1)
CHLORIDE SERPL-SCNC: 105 MMOL/L (ref 100–108)
CHLORIDE SERPL-SCNC: 107 MMOL/L (ref 100–108)
CLARITY UR: ABNORMAL
CO2 SERPL-SCNC: 24 MMOL/L (ref 21–32)
CO2 SERPL-SCNC: 27 MMOL/L (ref 21–32)
COLOR UR: YELLOW
CREAT SERPL-MCNC: 1.41 MG/DL (ref 0.6–1.3)
CREAT SERPL-MCNC: 1.56 MG/DL (ref 0.6–1.3)
EOSINOPHIL # BLD AUTO: 0.06 THOUSAND/ΜL (ref 0–0.61)
EOSINOPHIL NFR BLD AUTO: 0 % (ref 0–6)
ERYTHROCYTE [DISTWIDTH] IN BLOOD BY AUTOMATED COUNT: 16.3 % (ref 11.6–15.1)
EST. AVERAGE GLUCOSE BLD GHB EST-MCNC: 128 MG/DL
GFR SERPL CREATININE-BSD FRML MDRD: 45 ML/MIN/1.73SQ M
GFR SERPL CREATININE-BSD FRML MDRD: 51 ML/MIN/1.73SQ M
GLUCOSE SERPL-MCNC: 106 MG/DL (ref 65–140)
GLUCOSE SERPL-MCNC: 151 MG/DL (ref 65–140)
GLUCOSE SERPL-MCNC: 168 MG/DL (ref 65–140)
GLUCOSE SERPL-MCNC: 169 MG/DL (ref 65–140)
GLUCOSE SERPL-MCNC: 72 MG/DL (ref 65–140)
GLUCOSE SERPL-MCNC: 93 MG/DL (ref 65–140)
GLUCOSE SERPL-MCNC: 96 MG/DL (ref 65–140)
GLUCOSE UR STRIP-MCNC: NEGATIVE MG/DL
HBA1C MFR BLD: 6.1 % (ref 4.2–6.3)
HCT VFR BLD AUTO: 35 % (ref 36.5–49.3)
HGB BLD-MCNC: 10.5 G/DL (ref 12–17)
HGB UR QL STRIP.AUTO: ABNORMAL
IMM GRANULOCYTES # BLD AUTO: 0.08 THOUSAND/UL (ref 0–0.2)
IMM GRANULOCYTES NFR BLD AUTO: 1 % (ref 0–2)
INR PPP: 2.07 (ref 0.84–1.19)
KETONES UR STRIP-MCNC: NEGATIVE MG/DL
LACTATE SERPL-SCNC: 1.3 MMOL/L (ref 0.5–2)
LACTATE SERPL-SCNC: 2.2 MMOL/L (ref 0.5–2)
LEUKOCYTE ESTERASE UR QL STRIP: ABNORMAL
LYMPHOCYTES # BLD AUTO: 1.32 THOUSANDS/ΜL (ref 0.6–4.47)
LYMPHOCYTES NFR BLD AUTO: 10 % (ref 14–44)
MAGNESIUM SERPL-MCNC: 1.6 MG/DL (ref 1.6–2.6)
MCH RBC QN AUTO: 27.7 PG (ref 26.8–34.3)
MCHC RBC AUTO-ENTMCNC: 30 G/DL (ref 31.4–37.4)
MCV RBC AUTO: 92 FL (ref 82–98)
MONOCYTES # BLD AUTO: 1.06 THOUSAND/ΜL (ref 0.17–1.22)
MONOCYTES NFR BLD AUTO: 8 % (ref 4–12)
NEUTROPHILS # BLD AUTO: 11.06 THOUSANDS/ΜL (ref 1.85–7.62)
NEUTS SEG NFR BLD AUTO: 80 % (ref 43–75)
NITRITE UR QL STRIP: NEGATIVE
NON-SQ EPI CELLS URNS QL MICRO: ABNORMAL /HPF
NRBC BLD AUTO-RTO: 0 /100 WBCS
PH UR STRIP.AUTO: 5 [PH]
PHOSPHATE SERPL-MCNC: 3.7 MG/DL (ref 2.3–4.1)
PLATELET # BLD AUTO: 208 THOUSANDS/UL (ref 149–390)
PMV BLD AUTO: 11.1 FL (ref 8.9–12.7)
POTASSIUM SERPL-SCNC: 4.2 MMOL/L (ref 3.5–5.3)
POTASSIUM SERPL-SCNC: 4.6 MMOL/L (ref 3.5–5.3)
PROCALCITONIN SERPL-MCNC: 0.33 NG/ML
PROT SERPL-MCNC: 6.5 G/DL (ref 6.4–8.2)
PROT SERPL-MCNC: 7.5 G/DL (ref 6.4–8.2)
PROT UR STRIP-MCNC: ABNORMAL MG/DL
PROTHROMBIN TIME: 22.8 SECONDS (ref 11.6–14.5)
RBC # BLD AUTO: 3.79 MILLION/UL (ref 3.88–5.62)
RBC #/AREA URNS AUTO: ABNORMAL /HPF
SODIUM SERPL-SCNC: 138 MMOL/L (ref 136–145)
SODIUM SERPL-SCNC: 138 MMOL/L (ref 136–145)
SP GR UR STRIP.AUTO: 1.01 (ref 1–1.03)
TSH SERPL DL<=0.05 MIU/L-ACNC: 1.21 UIU/ML (ref 0.36–3.74)
UROBILINOGEN UR QL STRIP.AUTO: 1 E.U./DL
WBC # BLD AUTO: 13.65 THOUSAND/UL (ref 4.31–10.16)
WBC #/AREA URNS AUTO: ABNORMAL /HPF

## 2019-12-05 PROCEDURE — 83036 HEMOGLOBIN GLYCOSYLATED A1C: CPT | Performed by: INTERNAL MEDICINE

## 2019-12-05 PROCEDURE — 85025 COMPLETE CBC W/AUTO DIFF WBC: CPT | Performed by: INTERNAL MEDICINE

## 2019-12-05 PROCEDURE — 99223 1ST HOSP IP/OBS HIGH 75: CPT | Performed by: INTERNAL MEDICINE

## 2019-12-05 PROCEDURE — 84100 ASSAY OF PHOSPHORUS: CPT | Performed by: INTERNAL MEDICINE

## 2019-12-05 PROCEDURE — 83605 ASSAY OF LACTIC ACID: CPT | Performed by: EMERGENCY MEDICINE

## 2019-12-05 PROCEDURE — 82948 REAGENT STRIP/BLOOD GLUCOSE: CPT

## 2019-12-05 PROCEDURE — 96367 TX/PROPH/DG ADDL SEQ IV INF: CPT

## 2019-12-05 PROCEDURE — 84443 ASSAY THYROID STIM HORMONE: CPT | Performed by: INTERNAL MEDICINE

## 2019-12-05 PROCEDURE — 80053 COMPREHEN METABOLIC PANEL: CPT | Performed by: INTERNAL MEDICINE

## 2019-12-05 PROCEDURE — 87086 URINE CULTURE/COLONY COUNT: CPT | Performed by: EMERGENCY MEDICINE

## 2019-12-05 PROCEDURE — 87186 SC STD MICRODIL/AGAR DIL: CPT | Performed by: EMERGENCY MEDICINE

## 2019-12-05 PROCEDURE — 73590 X-RAY EXAM OF LOWER LEG: CPT

## 2019-12-05 PROCEDURE — 99222 1ST HOSP IP/OBS MODERATE 55: CPT | Performed by: PODIATRIST

## 2019-12-05 PROCEDURE — 87077 CULTURE AEROBIC IDENTIFY: CPT | Performed by: EMERGENCY MEDICINE

## 2019-12-05 PROCEDURE — 93005 ELECTROCARDIOGRAM TRACING: CPT

## 2019-12-05 PROCEDURE — 83735 ASSAY OF MAGNESIUM: CPT | Performed by: INTERNAL MEDICINE

## 2019-12-05 PROCEDURE — 99285 EMERGENCY DEPT VISIT HI MDM: CPT | Performed by: EMERGENCY MEDICINE

## 2019-12-05 PROCEDURE — 36415 COLL VENOUS BLD VENIPUNCTURE: CPT | Performed by: EMERGENCY MEDICINE

## 2019-12-05 PROCEDURE — 81001 URINALYSIS AUTO W/SCOPE: CPT | Performed by: EMERGENCY MEDICINE

## 2019-12-05 PROCEDURE — 96368 THER/DIAG CONCURRENT INF: CPT

## 2019-12-05 RX ORDER — SODIUM CHLORIDE 9 MG/ML
75 INJECTION, SOLUTION INTRAVENOUS CONTINUOUS
Status: DISCONTINUED | OUTPATIENT
Start: 2019-12-05 | End: 2019-12-05

## 2019-12-05 RX ORDER — ONDANSETRON 2 MG/ML
4 INJECTION INTRAMUSCULAR; INTRAVENOUS EVERY 6 HOURS PRN
Status: DISCONTINUED | OUTPATIENT
Start: 2019-12-05 | End: 2019-12-11 | Stop reason: HOSPADM

## 2019-12-05 RX ORDER — INSULIN GLARGINE 100 [IU]/ML
18 INJECTION, SOLUTION SUBCUTANEOUS
Status: DISCONTINUED | OUTPATIENT
Start: 2019-12-05 | End: 2019-12-05

## 2019-12-05 RX ORDER — ACYCLOVIR 800 MG/1
400 TABLET ORAL DAILY
Status: DISCONTINUED | OUTPATIENT
Start: 2019-12-05 | End: 2019-12-05

## 2019-12-05 RX ORDER — ACETAMINOPHEN 325 MG/1
650 TABLET ORAL EVERY 6 HOURS PRN
Status: DISCONTINUED | OUTPATIENT
Start: 2019-12-05 | End: 2019-12-11 | Stop reason: HOSPADM

## 2019-12-05 RX ORDER — FLUTICASONE FUROATE AND VILANTEROL 200; 25 UG/1; UG/1
1 POWDER RESPIRATORY (INHALATION)
Status: DISCONTINUED | OUTPATIENT
Start: 2019-12-05 | End: 2019-12-11 | Stop reason: HOSPADM

## 2019-12-05 RX ORDER — CEFAZOLIN SODIUM 2 G/50ML
2000 SOLUTION INTRAVENOUS EVERY 8 HOURS
Status: DISCONTINUED | OUTPATIENT
Start: 2019-12-05 | End: 2019-12-06

## 2019-12-05 RX ORDER — ALBUTEROL SULFATE 90 UG/1
2 AEROSOL, METERED RESPIRATORY (INHALATION) EVERY 6 HOURS PRN
Status: DISCONTINUED | OUTPATIENT
Start: 2019-12-05 | End: 2019-12-11 | Stop reason: HOSPADM

## 2019-12-05 RX ORDER — BISACODYL 10 MG
10 SUPPOSITORY, RECTAL RECTAL DAILY PRN
Status: DISCONTINUED | OUTPATIENT
Start: 2019-12-05 | End: 2019-12-11 | Stop reason: HOSPADM

## 2019-12-05 RX ORDER — AMMONIUM LACTATE 12 G/100G
LOTION TOPICAL DAILY
Status: DISCONTINUED | OUTPATIENT
Start: 2019-12-05 | End: 2019-12-11 | Stop reason: HOSPADM

## 2019-12-05 RX ORDER — MAGNESIUM HYDROXIDE/ALUMINUM HYDROXICE/SIMETHICONE 120; 1200; 1200 MG/30ML; MG/30ML; MG/30ML
15 SUSPENSION ORAL EVERY 6 HOURS PRN
Status: DISCONTINUED | OUTPATIENT
Start: 2019-12-05 | End: 2019-12-11 | Stop reason: HOSPADM

## 2019-12-05 RX ORDER — ACYCLOVIR 200 MG/1
400 CAPSULE ORAL DAILY
Status: DISCONTINUED | OUTPATIENT
Start: 2019-12-06 | End: 2019-12-11 | Stop reason: HOSPADM

## 2019-12-05 RX ORDER — CLOTRIMAZOLE 1 %
CREAM (GRAM) TOPICAL 2 TIMES DAILY
Status: DISCONTINUED | OUTPATIENT
Start: 2019-12-05 | End: 2019-12-11 | Stop reason: HOSPADM

## 2019-12-05 RX ORDER — INSULIN GLARGINE 100 [IU]/ML
10 INJECTION, SOLUTION SUBCUTANEOUS
Status: DISCONTINUED | OUTPATIENT
Start: 2019-12-05 | End: 2019-12-06

## 2019-12-05 RX ORDER — WARFARIN SODIUM 7.5 MG/1
7.5 TABLET ORAL
Status: DISCONTINUED | OUTPATIENT
Start: 2019-12-05 | End: 2019-12-11 | Stop reason: HOSPADM

## 2019-12-05 RX ORDER — WARFARIN SODIUM 5 MG/1
10 TABLET ORAL
Status: DISCONTINUED | OUTPATIENT
Start: 2019-12-05 | End: 2019-12-05

## 2019-12-05 RX ORDER — ATORVASTATIN CALCIUM 20 MG/1
20 TABLET, FILM COATED ORAL
Status: DISCONTINUED | OUTPATIENT
Start: 2019-12-05 | End: 2019-12-11 | Stop reason: HOSPADM

## 2019-12-05 RX ADMIN — CLOTRIMAZOLE 1 APPLICATION: 10 CREAM TOPICAL at 08:26

## 2019-12-05 RX ADMIN — WARFARIN SODIUM 7.5 MG: 7.5 TABLET ORAL at 17:16

## 2019-12-05 RX ADMIN — ACYCLOVIR 400 MG: 800 TABLET ORAL at 08:27

## 2019-12-05 RX ADMIN — METOPROLOL TARTRATE 25 MG: 25 TABLET, FILM COATED ORAL at 03:30

## 2019-12-05 RX ADMIN — CEFEPIME HYDROCHLORIDE 1000 MG: 1 INJECTION, POWDER, FOR SOLUTION INTRAMUSCULAR; INTRAVENOUS at 12:22

## 2019-12-05 RX ADMIN — FLUTICASONE FUROATE AND VILANTEROL TRIFENATATE 1 PUFF: 200; 25 POWDER RESPIRATORY (INHALATION) at 08:26

## 2019-12-05 RX ADMIN — VANCOMYCIN HYDROCHLORIDE 1750 MG: 10 INJECTION, POWDER, LYOPHILIZED, FOR SOLUTION INTRAVENOUS at 00:59

## 2019-12-05 RX ADMIN — CEFAZOLIN SODIUM 2000 MG: 2 SOLUTION INTRAVENOUS at 21:13

## 2019-12-05 RX ADMIN — SODIUM CHLORIDE 75 ML/HR: 0.9 INJECTION, SOLUTION INTRAVENOUS at 01:30

## 2019-12-05 RX ADMIN — CEFEPIME HYDROCHLORIDE 2000 MG: 2 INJECTION, POWDER, FOR SOLUTION INTRAVENOUS at 00:07

## 2019-12-05 RX ADMIN — INSULIN LISPRO 1 UNITS: 100 INJECTION, SOLUTION INTRAVENOUS; SUBCUTANEOUS at 21:26

## 2019-12-05 RX ADMIN — INSULIN GLARGINE 10 UNITS: 100 INJECTION, SOLUTION SUBCUTANEOUS at 21:14

## 2019-12-05 RX ADMIN — Medication 1 APPLICATION: at 08:26

## 2019-12-05 RX ADMIN — INSULIN LISPRO 1 UNITS: 100 INJECTION, SOLUTION INTRAVENOUS; SUBCUTANEOUS at 17:16

## 2019-12-05 RX ADMIN — ATORVASTATIN CALCIUM 20 MG: 20 TABLET, FILM COATED ORAL at 17:16

## 2019-12-05 RX ADMIN — METOPROLOL TARTRATE 25 MG: 25 TABLET, FILM COATED ORAL at 21:24

## 2019-12-05 NOTE — UTILIZATION REVIEW
Notification of Inpatient Admission/Inpatient Authorization Request   This is a Notification of Inpatient Admission for [address]  Be advised that this patient was admitted to our facility under Inpatient Status  Contact David Andrews at [phone] for additional admission information  [facility] DEPT  DEDICATED [phone]  Patient Name:   Jesse Apodaca   YOB: 1950       [address]   [address]:   [facility]  Tax ID: [id number]  NPI: [id number] Attending Provider/NPI: Mike Stark Md [[id number]]   Place of Service Code: 24     Place of Service Name:  [address] [place] [address]   Start Date: 12/5/19 0112     Discharge Date & Time: No discharge date for patient encounter  Type of Admission: Inpatient Status Discharge Disposition (if discharged): Non [facility] SNF/TCU/SNU   Patient Diagnoses: Fever [R50 9]     Orders: Admission Orders (From admission, onward)     Ordered        12/05/19 0119  Inpatient Admission  Once                    Assigned Utilization Review Contact: David Andrews  Utilization   Network Utilization Review Department  Phone: [phone]; Fax [phone]  Email: [email or web address]   ATTENTION PAYERS: Please call the assigned Utilization  directly with any questions or concerns ALL voicemails in the department are confidential  Send all requests for admission clinical reviews, approved or denied determinations and any other requests to dedicated fax number belonging to the campus where the patient is receiving treatment

## 2019-12-05 NOTE — CONSULTS
Podiatry Consultation - Alberto Browning     Patient Information: Ivett Hartmann 81 y o  male MRN: [id number]  Unit/Bed#: -01 Encounter: [id number]  PCP: Olga Pratt MD  Date of Admission:  12/4/2019  Date of Consultation: 12/05/19  Requesting Physician: Jeison Seymour MD    Reason For Consultation:   RLE cellulitus    Assessment:     Podiatric assessment:   RLE cellulitus   RLE edema   RLE venous stasis skin changes   Anterior leg excoriations    Principal Problem:    Sepsis due to cellulitis [facility])  Active Problems:    Diabetes mellitus type 2, uncontrolled ([facility] )    Essential hypertension    Chronic atrial fibrillation    Hyperlipidemia    Multiple myeloma ([place] [address] )    CKD (chronic kidney disease)    Chronic diastolic (congestive) heart failure ([place] [address] )      Plan:    · Patient was seen and evaluated at bedside in the ED  With labs and vitals reviewed  · No open or draining wounds noted of the RLE  Leg is edematous and erythematous extending just distal to the knee  · Elevated WBC likely cellulitis vs UTI  · Continue IV abx per primary team (cefepime and vancomycin)  · WBAT with LE elevation while in bed  · With no open wound to manage Podiatry will follow peripherally on a weekly basis  Please contact sooner if questions arise  History of Present Illness:    Ivett Hartmann is a 81 y o  male who is originally admitted 12/4/2019 due to sepsis     We are consulted for Cellulitis  Ivett Hartmann is here for evaluation of cellulitis  Current length of antibiotic treatment duration: approximately 3 hours  He reports that recently he has had generalized weakness and difficulty with ambulation, strength, breathing when getting out of bed to go to the bathroom  He reports that he has some baseline level of swelling in the RLE usually  He has a history of CHF, DM, and previous LLE AKA amputaion  Sharlee Patelud He reports approximately 2 day history of fever with some chills   He states the redness in his LE is more than typical  He reports no pain at this time, and has no further podiatric complaints  Review of Systems:    Review of Systems   Constitutional: Negative  HENT: Negative  Eyes: Negative  Respiratory: Negative  Cardiovascular: Negative  Gastrointestinal: Negative  Musculoskeletal: as noted in HPI  Skin: excoriations  Neurological: Negative  Psych: Negative  Past Medical and Surgical History:     Past Medical History:   Diagnosis Date    Cancer [facility])     CH[facility]congestive [facility] failure) ([facility])     Chronic kidney disease     COPD (chronic obstructive pulmonary disease) ([facility])     Decubitus ulcer of heel     LAST ASSESSED [id number]    Diabetes mellitus (HCC)     History of varicose veins     Hypertension     Intermittent claudication (HCC)     Neuropathy     Seasonal allergies        Past Surgical History:   Procedure Laterality Date    AMPUTATION ABOVE KNEE (AKA) Left 7/31/2019    Procedure: AMPUTATION ABOVE KNEE (AKA); Surgeon: Diana Lucero MD;  Location:  MAIN OR;  Service: General    ANGIOPLASTY      W/ FLUOROSC ANGIOGRAPGY PERIPHERAL ARTERY ADDITIONAL  LAST ASSESSED [id number]    ANGIOPLASTY / STENTING FEMORAL      TANSCATH INTRAVASCULAR STENT PLACEMENT PERCUTANEOUS FEMORAL     CT BONE MARROW BIOPSY AND ASPIRATION  8/9/2019    FULL THICKNESS SKIN GRAFT      TENDON REPAIR      TOE AMPUTATION Left 12/27/2018    Procedure: AMPUTATION left 4th TOE;  Surgeon: Scar Hansen DPM;  Location:  MAIN OR;  Service: Podiatry       Meds/Allergies:    Meds:  PTA meds:   Prior to Admission Medications   Prescriptions Last Dose Informant Patient Reported? Taking?    Alcohol Swabs (ALCOHOL PREP) 70 % PADS  Outside Facility (Specify) Yes No   BD INSULIN SYRINGE U/F 31G X 5/16" 0 5 ML MISC  Outside Facility (Specify) Yes No   Sig: USE AS DIRECTED WITH NOVOLIN 70/30   BD PEN NEEDLE HILARIO U/F 32G X 4 MM MISC  Outside Facility (Specify) No No   Sig: by Other route 3 (three) times a day   Insulin Pen Needle 31G X 5 MM MISC  Outside Facility (Specify) No No   Sig: by Does not apply route 2 (two) times a day for 50 days   acetaminophen (TYLENOL) 500 mg tablet Past Week at Unknown time  No Yes   Sig: Take 1 tablet (500 mg total) by mouth every 6 (six) hours as needed for mild pain, headaches or fever   acyclovir (ZOVIRAX) 400 MG tablet 12/4/2019 at 0800 Outside Facility (Specify) No Yes   Sig: Take 1 tablet (400 mg total) by mouth daily   albuterol (PROVENTIL HFA,VENTOLIN HFA) 90 mcg/act inhaler Past Week at Unknown time Outside Facility (Specify) No Yes   Sig: Inhale 2 puffs every 6 (six) hours as needed for wheezing   allopurinol (ZYLOPRIM) 300 mg tablet 12/4/2019 at 0800 Outside Facility (Specify) No Yes   Sig: TAKE 1 TABLET BY MOUTH DAILY   ammonium lactate (LAC-HYDRIN) 12 % lotion  Outside Facility (Specify) Yes No   atorvastatin (LIPITOR) 20 mg tablet 12/4/2019 at 1700 Outside Facility (Specify) No Yes   Sig: TAKE 1 TABLET BY MOUTH ONCE DAILY   bisacodyl (DULCOLAX) 10 mg suppository Past Week at Unknown time  Yes Yes   Sig: Insert 10 mg into the rectum as needed for constipation   bisacodyl (bisacodyl) 5 mg EC tablet   Yes No   Sig: Take 5 mg by mouth daily as needed for constipation   bortezomib (VELCADE)  Outside Facility (Specify) Yes No   Sig: Infuse into a venous catheter once   clotrimazole (LOTRIMIN) 1 % cream  Outside Facility (Specify) No No   Sig: Apply topically 2 (two) times a day   fluticasone-salmeterol (ADVAIR DISKUS, WIXELA INHUB) 250-50 mcg/dose inhaler 12/4/2019 at 0800 Outside Facility (Specify) No Yes   Sig: Inhale 1 puff 2 (two) times a day Rinse mouth after use     insulin aspart (NOVOLOG FLEXPEN) 100 Units/mL injection pen 12/4/2019 at 10 [facility]) No Yes   Sig: Inject 30 Units under the skin 3 (three) times a day with meals   insulin glargine (LANTUS) 100 units/mL subcutaneous injection 12/3/2019 at Unknown time Outside Facility [facility]) No Yes   Sig: Inject 18 Units under the skin daily at bedtime   metFORMIN (GLUCOPHAGE) 1000 MG tablet 2019 at 0800 Outside Facility (Specify) Yes Yes   metoprolol tartrate (LOPRESSOR) 25 mg tablet 2019 at 0800 Outside Facility (Specify) No Yes   Sig: TAKE 1 TABLET BY MOUTH EVERY 12 HOURS   torsemide (DEMADEX) 10 mg tablet 2019 at 0800 Outside Facility (Specify) No Yes   Sig: Take 1 tablet (10 mg total) by mouth 2 (two) times a day   warfarin (COUMADIN) 7 5 mg tablet 12/3/2019 at [facility]) No Yes   Si 5 mg on  and  then INR on    Patient taking differently: Take 10 mg by mouth       Facility-Administered Medications: None       Allergies:    Allergies   Allergen Reactions    Latex Rash       Social History:     Marital Status:       Substance Use History:   Social History     Substance and Sexual Activity   Alcohol Use Not Currently     Social History     Tobacco Use   Smoking Status Never Smoker   Smokeless Tobacco Never Used     Social History     Substance and Sexual Activity   Drug Use Not Currently       Family History:    Family History   Problem Relation Age of Onset    Other Mother         CARDIAC DISORDER     Diabetes Mother     Cancer Father     Other Family         CARDIAC DISORDER     Diabetes Family     Cancer Family     Mental illness Family     Kidney disease Sister        Physical Exam:     Vitals:   Blood Pressure: (!) 172/79 (19 1453)  Pulse: 74 (19 1453)  Temperature: 98 1 °F (36 7 °C) (19 1453)  Temp Source: Oral (198)  Respirations: 16 (19 1453)  Height: 6' 3" (190 5 cm) (19 1453)  Weight - Scale: (!) 161 kg (354 lb 8 oz) (19 1453)  SpO2: 96 % (19 1453)        Physical Exam  General Appearance:    Alert, cooperative, no distress   Head:    Normocephalic, without obvious abnormality, atraumatic   Eyes:    PERRL, conjunctiva/corneas clear      Nose:   Moist mucous membranes   Neck:   Supple, symmetrical, trachea midline   Back:     Symmetric   Lungs:     Respirations unlabored   Heart:    Regular rate and rhythm   Abdomen:     Soft, non-tender    Foot Exam     Vascular:   DP pulses and PT pulses are weak RLE  CRT < 3 seconds at the digits RLE  +2/4 edema noted noted bilaterally  Pedal hair is absent  Skin temperature is WNL RLE  Musculoskeletal:   MMT is 5/5 to all compartments of the RLE, No pain on palpation noted of RLE  ROM at the ankle joint is limited  no gross deformities noted of RLE  LLE AKA  Dermatological:   Skin is of Abnormal appearance  Skin is of Abnormal  turgor  Skin is of Abnormal temperature  Edema and erythema noted of RLE extending from the ankle to proximal tibia  Few excoriation are noted on the anterior aspect of the RLE  Venous stasis skin changes are noted of most notable at the distal 1/3 of the leg  See images below   Neurologic:   Gross sensation is Intact  Protective sensation is Absent  Clinical Images 12/05/19:    RLE    RLE      Lab Results: I have personally reviewed pertinent reports  Results from last 7 days   Lab Units 12/05/19  0505   WBC Thousand/uL 13 65*   HEMOGLOBIN g/dL 10 5*   HEMATOCRIT % 35 0*   PLATELETS Thousands/uL 208   NEUTROS PCT % 80*   LYMPHS PCT % 10*   MONOS PCT % 8   EOS PCT % 0     Results from last 7 days   Lab Units 12/05/19  0505   POTASSIUM mmol/L 4 2   CHLORIDE mmol/L 107   CO2 mmol/L 24   BUN mg/dL 32*   CREATININE mg/dL 1 41*   CALCIUM mg/dL 8 0*   ALK PHOS U/L 223*   ALT U/L 23   AST U/L 36     Results from last 7 days   Lab Units 12/04/19  2333   INR  2 07*       Imaging: I have personally reviewed pertinent films in PACS      ** Please Note: This note has been constructed using a voice recognition system   **

## 2019-12-05 NOTE — ASSESSMENT & PLAN NOTE
· Hx of CKD stage 2-3 per last nephro note  · Concern of DORA - Cr 1 5 on admission  · Baseline appears to be 1 2-1 4  · Continue to monitor  · Restart Torsemide tomorrow

## 2019-12-05 NOTE — ASSESSMENT & PLAN NOTE
· Pt not in remission  · Received chemotherapy - Velcade and decadron cycle 1/6  · Also received venofer for associated anemia

## 2019-12-05 NOTE — UTILIZATION REVIEW
Initial Clinical Review    Admission: Date/Time/Statement: Inpatient Admission Orders (From admission, onward)     Ordered        12/05/19 0119  Inpatient Admission  Once                   Orders Placed This Encounter   Procedures    Inpatient Admission     Standing Status:   Standing     Number of Occurrences:   1     Order Specific Question:   Admitting Physician     Answer:   You Fitzpatrick [[id number]]     Order Specific Question:   Level of Care     Answer:   Med Surg [[id number]]     Order Specific Question:   Estimated length of stay     Answer:   More than 2 Midnights     Order Specific Question:   Certification     Answer:   I certify that inpatient services are medically necessary for this patient for a duration of greater than two midnights  See H&P and MD Progress Notes for additional information about the patient's course of treatment  ED Arrival Information     Expected Arrival Acuity Means of Arrival Escorted By Service Admission Type    - 12/4/2019 22:34 [facility] General Medicine Emergency    Arrival Complaint    fever        Chief Complaint   Patient presents with    Fever - 9 weeks to 74 years     Pt [facility] from [facility] with [facility] fever  Pt reports it has been going on for about a week now     Assessment/Plan: 80 yo m with a PMH of morbid obesity, DM, HTN, chronic afib on coumadin, left side AKA , chronic cellulitis on the RLE MRSA positive, CHF on diuretics  He had recently been treated for pneumonia  He presents to the Er with a fever x 2 days, and increased redness of his RLE, decreased urine output ( malodorous)  He reports sick contacts at the assisted living facility at which he resides  He does reports one episode of diarrhea  He is admitted inpatient for a diagnosis of cellulitis          EXAM - RLE +2 pitting edema in chronic cellulitic changes  with acute appearing erythema and tenderness                 Cellulitis  Assessment & Plan  Patient claims redness is worse  Started on cefepime / vancomycin asked per sepsis protocol  Infectious disease consult  Blood culture on board  We will recheck lactic acid  Fluids to continue  Recheck CBC with differential and metabolic profile for tomorrow      DORA (acute kidney injury) ([facility] )  Assessment & Plan  With hold torsemide  Continue fluids  Recheck metabolic profile      Diabetes mellitus type 2, uncontrolled ([facility] )  Assessment & Plan        Lab Results   Component Value Date     HGBA1C 9 4 (H) 12/27/2018   Continue insulin glargine 18 units at night  He is also on accumulating 30 units with meals  Check blood sugar status per protocol related incident sliding scale  Metformin was on hold due to elevated creatinine      No results for input(s): POCGLU in the last 72 hours      Blood Sugar Average: Last 72 hrs:        Hyperlipidemia  Assessment & Plan  Continue Lipitor      Chronic atrial fibrillation  Assessment & Plan  On warfarin 10 mg daily  Continue metoprolol      Essential hypertension  Assessment & Plan  With done hold Demadex due to elevated creatinine  Continue metoprolol        Infectious Disease consult - Pending       ED Triage Vitals [12/04/19 2238]   Temperature Pulse Respirations Blood Pressure SpO2   (!) 100 9 °F (38 3 °C) 83 19 112/55 93 %      Temp Source Heart Rate Source Patient Position - Orthostatic VS BP Location FiO2 (%)   Oral Monitor Lying Right arm --      Pain Score       No Pain        Wt Readings from Last 1 Encounters:   12/05/19 (!) 162 kg (357 lb 5 9 oz)     Additional Vital Signs:  Date/Time  Temp  Pulse  Resp  BP  SpO2  O2 Device  Patient Position - Orthostatic VS   12/05/19 0600  --  66  16  121/55  93 %  Nasal cannula  Lying   12/05/19 0500  --  70  16  129/57  92 %  Nasal cannula  Lying   12/05/19 0200  --  82  14  116/54  93 %  --  --   12/05/19 0130  100 °F (37 8 °C)  89  18  128/57  91 %  Nasal cannula  Lying   12/05/19 0115  --  76  18  121/67  93 %  Nasal cannula  -- 12/05/19 0030  --  84  --  115/58  91 %  Nasal cannula  --   12/05/19 0028  --  78  16  113/82  93 %  Nasal cannula  --           Pertinent Labs/Diagnostic Test Results:   Results from last 7 days   Lab Units 12/05/19  0505 12/04/19  2333 12/02/19  1202   WBC Thousand/uL 13 65* 14 27* 6 45   HEMOGLOBIN g/dL 10 5* 11 4* 12 4   HEMATOCRIT % 35 0* 38 4 42 6   PLATELETS Thousands/uL 208 227 244   NEUTROS ABS Thousands/µL 11 06* 11 59* 4 18     Results from last 7 days   Lab Units 12/05/19  0505 12/04/19  2333 12/02/19  1202   SODIUM mmol/L 138 138 144   POTASSIUM mmol/L 4 2 4 6 4 3   CHLORIDE mmol/L 107 105 110*   CO2 mmol/L 24 27 28   ANION GAP mmol/L 7 6 6   BUN mg/dL 32* 33* 29*   CREATININE mg/dL 1 41* 1 56* 1 10   EGFR ml/min/1 73sq m 51 45 69   CALCIUM mg/dL 8 0* 8 8 9 7   MAGNESIUM mg/dL 1 6  --   --    PHOSPHORUS mg/dL 3 7  --   --      Results from last 7 days   Lab Units 12/05/19  0505 12/04/19  2333 12/02/19  1202   AST U/L 36 30 15   ALT U/L 23 25 17   ALK PHOS U/L 223* 259* 200*   TOTAL PROTEIN g/dL 6 5 7 5 7 6  7 2   ALBUMIN g/dL 2 7* 3 4* 3 7   TOTAL BILIRUBIN mg/dL 1 26* 1 16* 0 60     Results from last 7 days   Lab Units 12/05/19  0707 12/05/19  0308   POC GLUCOSE mg/dl 106 96     Results from last 7 days   Lab Units 12/05/19  0505 12/04/19  2333 12/02/19  1202   GLUCOSE RANDOM mg/dL 93 72 76     Results from last 7 days   Lab Units 12/05/19  0505   HEMOGLOBIN A1C % 6 1   EAG mg/dl 128     Results from last 7 days   Lab Units 12/04/19  2333 12/02/19  1202   PROTIME seconds 22 8* 19 2*   INR  2 07* 1 67*   PTT seconds 53*  --      Results from last 7 days   Lab Units 12/05/19  0505   TSH 3RD GENERATON uIU/mL 1 210     Results from last 7 days   Lab Units 12/04/19  2333   PROCALCITONIN ng/ml 0 33*     Results from last 7 days   Lab Units 12/05/19  0130 12/04/19  2333   LACTIC ACID mmol/L 1 3 2 2*     Results from last 7 days   Lab Units 12/05/19  0001   CLARITY UA  Turbid   COLOR UA  Yellow   SPEC GRAV UA  1 011   PH UA  5 0   GLUCOSE UA mg/dl Negative   KETONES UA mg/dl Negative   BLOOD UA  Large*   PROTEIN UA mg/dl Trace*   NITRITE UA  Negative   BILIRUBIN UA  Negative   UROBILINOGEN UA E U /dl 1 0   LEUKOCYTES UA  Large*   WBC UA /hpf Innumerable*   RBC UA /hpf 4-10*   BACTERIA UA /hpf Occasional   EPITHELIAL CELLS WET PREP /hpf Occasional     Results from last 7 days   Lab Units 12/04/19  2333   BLOOD CULTURE  Received in Microbiology Lab  Culture in Progress  Received in Microbiology Lab  Culture in Progress       CXR 12/5 - Question left base opacity, possibly due to atelectasis  Vs pneumonia       ED Treatment:   Medication Administration from 12/04/2019 2234 to 12/05/2019 4026       Date/Time Order Dose Route Action     12/04/2019 2345 lactated ringers bolus 500 mL 500 mL Intravenous New Bag     12/05/2019 0007 cefepime (MAXIPIME) 2 g/50 mL dextrose IVPB 2,000 mg Intravenous New Bag     12/05/2019 0059 vancomycin (VANCOCIN) 1,750 mg in sodium chloride 0 9 % 500 mL IVPB 1,750 mg Intravenous New Bag     12/05/2019 0130 sodium chloride 0 9 % infusion 75 mL/hr Intravenous New Bag     12/05/2019 0827 acyclovir (ZOVIRAX) tablet 400 mg 400 mg Oral Given     12/05/2019 0826 ammonium lactate (LAC-HYDRIN) 12 % lotion 1 application Topical Given     12/05/2019 0826 clotrimazole (LOTRIMIN) 1 % cream 1 application Topical Given     12/05/2019 0826 fluticasone-vilanterol (BREO ELLIPTA) 200-25 MCG/INH inhaler 1 puff 1 puff Inhalation Given     12/05/2019 0330 metoprolol tartrate (LOPRESSOR) tablet 25 mg 25 mg Oral Given     12/05/2019 0717 insulin lispro (HumaLOG) 100 units/mL subcutaneous injection 1-6 Units 0 Units Subcutaneous Hold        Past Medical History:   Diagnosis Date    Cancer ([facility] )     CHF (congestive heart failure) ([facility] )     Chronic kidney disease     COPD (chronic obstructive pulmonary disease) ([facility] )     Decubitus ulcer of heel     LAST ASSESSED [id number]    Diabetes mellitus ([facility] )     History of varicose veins     Hypertension     Intermittent claudication (HCC)     Neuropathy     Seasonal allergies      Present on Admission:   Diabetes mellitus type 2, uncontrolled ([facility] )   Essential hypertension   Chronic atrial fibrillation   Hyperlipidemia   Cellulitis   DORA (acute kidney injury) ([facility] )      Admitting Diagnosis: Fever [R50 9]  Age/Sex: 81 y o  male  Admission Orders:  Scheduled Medications:    Medications:  acyclovir 400 mg Oral Daily   ammonium lactate  Topical Daily   atorvastatin 20 mg Oral Daily With Dinner   cefepime 1,000 mg Intravenous Q12H   clotrimazole  Topical BID   fluticasone-vilanterol 1 puff Inhalation Daily   insulin glargine 18 Units Subcutaneous HS   insulin lispro 1-5 Units Subcutaneous HS   insulin lispro 1-6 Units Subcutaneous TID AC   insulin lispro 30 Units Subcutaneous TID With Meals   metoprolol tartrate 25 mg Oral Q12H [address]   warfarin 7 5 mg Oral Daily (warfarin)     Continuous IV Infusions:    sodium chloride 75 mL/hr Intravenous Continuous     PRN Meds:    acetaminophen 650 mg Oral Q6H PRN   albuterol 2 puff Inhalation Q6H PRN   aluminum-magnesium hydroxide-simethicone 15 mL Oral Q6H PRN   bisacodyl 5 mg Oral Daily PRN   bisacodyl 10 mg Rectal Daily PRN   ondansetron 4 mg Intravenous Q6H PRN   sodium chloride (PF) 3 mL Intravenous PRN     Nursing Orders  -  VS q 4 - Daily weights  - I & O q shift -  Up with assistance - diet cons carb 2 gm NA       Network Utilization Review Department  [email or web address]  ATTENTION: Please call with any questions or concerns to [phone] and carefully listen to the prompts so that you are directed to the right person  All voicemails are confidential   Neetu Bridges all requests for admission clinical reviews, approved or denied determinations and any other requests to dedicated fax number below belonging to the campus where the patient is receiving treatment   List of dedicated fax numbers for the Facilities:  FACILITY NAME UR FAX NUMBER   ADMISSION DENIALS (Administrative/Medical Necessity) [address] (Maternity/NICU/Pediatrics) [phone]   [facility] [phone]   [facility] [phone]   [facility] [phone]   [facility] [phone]   Jil Kearns [phone]   [facility]  [phone]   Jonel Canela [address] [phone]

## 2019-12-05 NOTE — PROGRESS NOTES
[facility]     Progress Note - Josephine Ellisjerrod 1950, 81 y o  male MRN: [id number]    Unit/Bed#: ED 27 Encounter: [id number]    Primary Care Provider: Amaury Murphy MD   Date and time admitted to hospital: 12/4/2019 10:34 PM        * Sepsis due to cellulitis [facility])  Assessment & Plan  · Pt w/ hx of DM, obesity, PVD, AKA L side, and multiple myeloma currently receiving chemo admitted 12/5 for fevers, weakness, and worsening redness of R lower leg on chronic skin changes  Febrile and WBC on admission  · Started on cefepime  · D/c vanco at this time  · Blood culture on board  Urine culture pending  · Lactic acid improving  WBC improving  PCT   33 on admission   · Podiatry consulted     Chronic diastolic (congestive) heart failure [facility])  Assessment & Plan  Wt Readings from Last 3 Encounters:   12/05/19 (!) 162 kg (357 lb 5 9 oz)   11/05/19 (!) 159 kg (349 lb 14 4 oz)   10/20/19 (!) 159 kg (350 lb 8 5 oz)       · Hx of diastolic HF  · Echo 7/9/79 - EF 60% w/ grade 3 diastolic dysfunction   · Daily weights, I/Os  · Continue lopressor BID   · Torsemide temp stopped for DORA  Can likely continue tomorrow       CKD (chronic kidney disease)  Assessment & Plan  · Hx of CKD stage 2-3 per last nephro note  · Concern of DORA - Cr 1 5 on admission  · Baseline appears to be 1 2-1 4  · Continue to monitor  · Restart Torsemide tomorrow    Multiple myeloma ([facility] )  Assessment & Plan  · Pt not in remission  · Received chemotherapy - Velcade and decadron cycle 1/6  · Also received venofer for associated anemia     Hyperlipidemia  Assessment & Plan  · Continue Lipitor  Chronic atrial fibrillation  Assessment & Plan  · On warfarin 10 mg daily typically  · Monitor INR    · Continue metoprolol  Essential hypertension  Assessment & Plan  · Holding Demadex due to elevated creatinine  Can likely cont tomorrow   · Continue metoprolol      Diabetes mellitus type 2, uncontrolled ([facility] )  Assessment & Plan  Lab Results   Component Value Date    HGBA1C 6 1 2019       Recent Labs     19  0308 19  0707   POCGLU 96 106       Blood Sugar Average: Last 72 hrs:  (P) 101     · W/ neuropathy   · Continue insulin glargine 18 units at night  And 30 units with meals  · Metformin was on hold while inpt  VTE Pharmacologic Prophylaxis:   Pharmacologic: Warfarin (Coumadin)  Mechanical VTE Prophylaxis in Place: No    Patient Centered Rounds: I have evaluated patient without nursing staff present due to pt in ED    Discussions with Specialists or Other Care Team Provider: None     Education and Discussions with Family / Patient: Discussed plan of care with pt  Pt agressable  Time Spent for Care: 20 minutes  More than 50% of total time spent on counseling and coordination of care as described above  Current Length of Stay: 0 day(s)    Current Patient Status: Inpatient   Certification Statement: The patient will continue to require additional inpatient hospital stay due to IV abx     Discharge Plan: pending d/c of IV abx to oral    Code Status: Level 1 - Full Code      Subjective:   Pt states he feels fine today  Does not have feeling in R lower extremity and is unsure if he has increased warmth  Notes the redness does look improved  Stated he was having f/c prior to coming in  Unsure if there has been trauma to the leg  States he has chronic skin changes to the R leg  States he feels generalized weakness  Unable to determine change in sensation  Denies n/v, SOB, CP, cough  States he has been having increased frequency w/ urination and feeling of incomplete urination  Denies dysuria  Review of Systems - Negative except Generalized weakness, Fevers, frequency        Objective:     Vitals:   Temp (24hrs), Av 5 °F (38 1 °C), Min:100 °F (37 8 °C), Max:100 9 °F (38 3 °C)    Temp:  [100 °F (37 8 °C)-100 9 °F (38 3 °C)] 100 °F (37 8 °C)  HR:  [66-89] 66  Resp:  [14-19] 16  BP: (112-129)/(54-82) 121/55  SpO2: [91 %-93 %] 93 %  Body mass index is 44 67 kg/m²  Input and Output Summary (last 24 hours): Intake/Output Summary (Last 24 hours) at 12/5/2019 1034  Last data filed at 12/5/2019 0504  Gross per 24 hour   Intake 1250 ml   Output 300 ml   Net 950 ml       Physical Exam:     Physical Exam   Constitutional: He appears well-developed and well-nourished  Obese male sitting in bed     Eyes: Pupils are equal, round, and reactive to light  EOM are normal    Neck: Neck supple  No JVD present  Cardiovascular:   No murmur heard  Irregularly irregular    Pulmonary/Chest: Effort normal and breath sounds normal  He has no wheezes  He has no rales  Abdominal: Bowel sounds are normal  He exhibits distension  There is no tenderness  No CVA tenderness     Musculoskeletal: He exhibits no edema  Neurological:   Alert and oriented    Skin: Skin is warm and dry  Rash noted  There is erythema  R lower extremity w/ chronic venous stasis  Erythema noted above chronic changes  Skin marked, erythema does not spread beyond lines  Multiple scabs and tried blood noted on anterior shin  Psychiatric: He has a normal mood and affect  His behavior is normal    Nursing note and vitals reviewed        Additional Data:     Labs:    Results from last 7 days   Lab Units 12/05/19  0505   WBC Thousand/uL 13 65*   HEMOGLOBIN g/dL 10 5*   HEMATOCRIT % 35 0*   PLATELETS Thousands/uL 208   NEUTROS PCT % 80*   LYMPHS PCT % 10*   MONOS PCT % 8   EOS PCT % 0     Results from last 7 days   Lab Units 12/05/19  0505   SODIUM mmol/L 138   POTASSIUM mmol/L 4 2   CHLORIDE mmol/L 107   CO2 mmol/L 24   BUN mg/dL 32*   CREATININE mg/dL 1 41*   ANION GAP mmol/L 7   CALCIUM mg/dL 8 0*   ALBUMIN g/dL 2 7*   TOTAL BILIRUBIN mg/dL 1 26*   ALK PHOS U/L 223*   ALT U/L 23   AST U/L 36   GLUCOSE RANDOM mg/dL 93     Results from last 7 days   Lab Units 12/04/19  2333   INR  2 07*     Results from last 7 days   Lab Units 12/05/19  0707 12/05/19  0308   POC GLUCOSE mg/dl 106 96     Results from last 7 days   Lab Units 12/05/19  0505   HEMOGLOBIN A1C % 6 1     Results from last 7 days   Lab Units 12/05/19  0130 12/04/19  2333   LACTIC ACID mmol/L 1 3 2 2*   PROCALCITONIN ng/ml  --  0 33*           * I Have Reviewed All Lab Data Listed Above  * Additional Pertinent Lab Tests Reviewed: [facility] Admission Reviewed    Imaging:    Imaging Reports Reviewed Today Include: CXR    Imaging Personally Reviewed by Myself Includes:  None     Recent Cultures (last 7 days):     Results from last 7 days   Lab Units 12/04/19  2333   BLOOD CULTURE  Received in Microbiology Lab  Culture in Progress  Received in Microbiology Lab  Culture in Progress         Last 24 Hours Medication List:     Current Facility-Administered Medications:  acetaminophen 650 mg Oral Q6H PRN Hilario Wells MD    acyclovir 400 mg Oral Daily Hilario Wells MD    albuterol 2 puff Inhalation Q6H PRN Hilario Wells MD    aluminum-magnesium hydroxide-simethicone 15 mL Oral Q6H PRN Hilario Wells MD    ammonium lactate  Topical Daily Hilario Wells MD    atorvastatin 20 mg Oral Daily With Joaquin Chavarria MD    bisacodyl 5 mg Oral Daily PRN Hilario Wells MD    bisacodyl 10 mg Rectal Daily PRN Hilario Wells MD    cefepime 1,000 mg Intravenous Q12H Hilario Wells MD    clotrimazole  Topical BID Hilario Wells MD    fluticasone-vilanterol 1 puff Inhalation Daily Hilario Wells MD    insulin glargine 18 Units Subcutaneous HS Hilario Wells MD    insulin lispro 1-5 Units Subcutaneous HS Hilario Wells MD    insulin lispro 1-6 Units Subcutaneous TID AC Hilario Wells MD    insulin lispro 30 Units Subcutaneous TID With Meals Hilario Wells MD    metoprolol tartrate 25 mg Oral Q12H [facility] Hilario Wells MD    ondansetron 4 mg Intravenous Q6H PRN Hilario Wells MD    sodium chloride (PF) 3 mL Intravenous PRN Hilario Wells MD    sodium chloride 75 mL/hr Intravenous Continuous Manny Toney MD Last Rate: 75 mL/hr (12/05/19 0130)   warfarin 7 5 mg Oral Daily (warfarin) Manny Toney MD         Today, Patient Was Seen By: Nicole Knight PA-C    ** Please Note: Dictation voice to text software may have been used in the creation of this document   **

## 2019-12-05 NOTE — ASSESSMENT & PLAN NOTE
Wt Readings from Last 3 Encounters:   12/05/19 (!) 162 kg (357 lb 5 9 oz)   11/05/19 (!) 159 kg (349 lb 14 4 oz)   10/20/19 (!) 159 kg (350 lb 8 5 oz)       · Hx of diastolic HF  · Echo 2/4/68 - EF 60% w/ grade 3 diastolic dysfunction   · Daily weights, I/Os  · Continue lopressor BID   · Torsemide temp stopped for DORA   Can likely continue tomorrow

## 2019-12-05 NOTE — ASSESSMENT & PLAN NOTE
Lab Results   Component Value Date    HGBA1C 6 1 12/05/2019       Recent Labs     12/05/19  0308 12/05/19  0707 12/05/19  1105 12/05/19  1617   POCGLU 96 106 168* 151*       Blood Sugar Average: Last 72 hrs:  (P) 130 25     · W/ neuropathy   · On glargine 18 units at night and 30 units with meals at home  · A1C 6 1 - improved from 12/7/18 - 9 7  · Pt having low morning sugars  Changed glargine to 10 units at night and ISS  · Switched back to 15 units + 5 units AC + ISS HS  · Metformin was on hold while inpt    · Continue to monitor

## 2019-12-05 NOTE — PLAN OF CARE
Problem: Potential for Falls  Goal: Patient will remain free of falls  Description  INTERVENTIONS:  - Assess patient frequently for physical needs  -  Identify cognitive and physical deficits and behaviors that affect risk of falls  -  [place] fall precautions as indicated by assessment   - Educate patient/family on patient safety including physical limitations  - Instruct patient to call for assistance with activity based on assessment  - Modify environment to reduce risk of injury  - Consider OT/PT consult to assist with strengthening/mobility  Outcome: Progressing     Problem: INFECTION - ADULT  Goal: Absence or prevention of progression during hospitalization  Description  INTERVENTIONS:  - Assess and monitor for signs and symptoms of infection  - Monitor lab/diagnostic results  - Monitor all insertion sites, i e  indwelling lines, tubes, and drains  - Monitor endotracheal if appropriate and nasal secretions for changes in amount and color  - [place] appropriate cooling/warming therapies per order  - Administer medications as ordered  - Instruct and encourage patient and family to use good hand hygiene technique  - Identify and instruct in appropriate isolation precautions for identified infection/condition  Outcome: Progressing  Goal: Absence of fever/infection during neutropenic period  Description  INTERVENTIONS:  - Monitor WBC    Outcome: Progressing     Problem: SAFETY ADULT  Goal: Patient will remain free of falls  Description  INTERVENTIONS:  - Assess patient frequently for physical needs  -  Identify cognitive and physical deficits and behaviors that affect risk of falls    -  [place] fall precautions as indicated by assessment   - Educate patient/family on patient safety including physical limitations  - Instruct patient to call for assistance with activity based on assessment  - Modify environment to reduce risk of injury  - Consider OT/PT consult to assist with strengthening/mobility  Outcome: Progressing  Goal: Maintain or return to baseline ADL function  Description  INTERVENTIONS:  -  Assess patient's ability to carry out ADLs; assess patient's baseline for ADL function and identify physical deficits which impact ability to perform ADLs (bathing, care of mouth/teeth, toileting, grooming, dressing, etc )  - Assess/evaluate cause of self-care deficits   - Assess range of motion  - Assess patient's mobility; develop plan if impaired  - Assess patient's need for assistive devices and provide as appropriate  - Encourage maximum independence but intervene and supervise when necessary  - Involve family in performance of ADLs  - Assess for home care needs following discharge   - Consider OT consult to assist with ADL evaluation and planning for discharge  - Provide patient education as appropriate  Outcome: Progressing  Goal: Maintain or return mobility status to optimal level  Description  INTERVENTIONS:  - Assess patient's baseline mobility status (ambulation, transfers, stairs, etc )    - Identify cognitive and physical deficits and behaviors that affect mobility  - Identify mobility aids required to assist with transfers and/or ambulation (gait belt, sit-to-stand, lift, walker, cane, etc )  - [place] fall precautions as indicated by assessment  - Record patient progress and toleration of activity level on Mobility SBAR; progress patient to next Phase/Stage  - Instruct patient to call for assistance with activity based on assessment  - Consider rehabilitation consult to assist with strengthening/weightbearing, etc   Outcome: Progressing     Problem: DISCHARGE PLANNING  Goal: Discharge to home or other facility with appropriate resources  Description  INTERVENTIONS:  - Identify barriers to discharge w/patient and caregiver  - Arrange for needed discharge resources and transportation as appropriate  - Identify discharge learning needs (meds, wound care, etc )  - Arrange for interpretive services to assist at discharge as needed  - Refer to Case Management Department for coordinating discharge planning if the patient needs post-hospital services based on physician/advanced practitioner order or complex needs related to functional status, cognitive ability, or social support system  Outcome: Progressing     Problem: Knowledge Deficit  Goal: Patient/family/caregiver demonstrates understanding of disease process, treatment plan, medications, and discharge instructions  Description  Complete learning assessment and assess knowledge base    Interventions:  - Provide teaching at level of understanding  - Provide teaching via preferred learning methods  Outcome: Progressing

## 2019-12-05 NOTE — ASSESSMENT & PLAN NOTE
Patient claims redness is worse  Started on cefepime / vancomycin asked per sepsis protocol  Infectious disease consult  Blood culture on board  We will recheck lactic acid  Fluids to continue  Recheck CBC with differential and metabolic profile for tomorrow

## 2019-12-05 NOTE — H&P
H&P- Emely Garcia 1950, 81 y o  male MRN: [id number]    Unit/Bed#: ED 27 Encounter: [id number]    Primary Care Provider: Spike Ortiz MD   Date and time admitted to hospital: 12/4/2019 10:34 PM        * Cellulitis  Assessment & Plan  Patient claims redness is worse  Started on cefepime / vancomycin asked per sepsis protocol  Infectious disease consult  Blood culture on board  We will recheck lactic acid  Fluids to continue  Recheck CBC with differential and metabolic profile for tomorrow  DORA (acute kidney injury) [facility])  Assessment & Plan  With hold torsemide  Continue fluids  Recheck metabolic profile  Diabetes mellitus type 2, uncontrolled ([facility] )  Assessment & Plan  Lab Results   Component Value Date    HGBA1C 9 4 (H) 12/27/2018   Continue insulin glargine 18 units at night  He is also on accumulating 30 units with meals  Check blood sugar status per protocol related incident sliding scale  Metformin was on hold due to elevated creatinine  No results for input(s): POCGLU in the last 72 hours  Blood Sugar Average: Last 72 hrs:      Hyperlipidemia  Assessment & Plan  Continue Lipitor  Chronic atrial fibrillation  Assessment & Plan  On warfarin 10 mg daily  Continue metoprolol  Essential hypertension  Assessment & Plan  With done hold Demadex due to elevated creatinine  Continue metoprolol  VTE Prophylaxis: Warfarin (Coumadin)  / sequential compression device   Code Status: Prior full code as per  patient  POLST: There is no POLST form on file for this patient (pre-hospital)    Anticipated Length of Stay:  Patient will be admitted on an Inpatient basis with an anticipated length of stay of  greater than 2 midnights  Justification for Hospital Stay: Please see detailed plans noted above  Chief Complaint:     Fever  History of Present Illness:  Emely Garcia is a 81 y o  male who has a past medical history significant for morbid obesity and diabetes    He has essential hypertension, chronic atrial fibrillation on Coumadin, 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, chronic cellulitis on the right lower extremity which is positive for MRSA  He is on chronic diuretics for congestive heart failure  He comes in with 2 day history of fever  Would he lives in a skilled nursing facility and thinks there may be sick contacts in the facility  Cough but nonproductive and if ever is white sputum  No sore throat but claims uncomfortable feeling  Claims decreased urine output but without dysuria  One episode of diarrhea  He also claims increased redness of right lower extremity  Review of Systems:    Constitutional:  Fever as noted above  Eyes:  Denies change in visual acuity   HENT:  Mild sore throat  Denies postnasal drip  Respiratory:  Occasional cough  No shortness of breath  Cardiovascular:  Denies chest pain or edema   GI:  Denies abdominal pain, nausea, vomiting, bloody stools or diarrhea   :  Denies dysuria   Musculoskeletal:  Denies back pain or joint pain   Integument:  Denies rash with increased redness of the right lower extremity  Neurologic:  Denies headache, focal weakness or sensory changes   Endocrine:  Denies polyuria or polydipsia   Lymphatic:  Denies swollen glands   Psychiatric:  Denies depression or anxiety     Past Medical and Surgical History:   Past Medical History:   Diagnosis Date    [facility] )     [facility]F (congestive heart failure) ([facility])     Chronic kidney disease     COPD (chronic obstructive pulmonary disease) ([facility])     Decubitus ulcer of heel     LAST ASSESSED [id number]    Diabetes mellitus (HCC)     History of varicose veins     Hypertension     Intermittent claudication (HCC)     Neuropathy     Seasonal allergies      Past Surgical History:   Procedure Laterality Date    AMPUTATION ABOVE KNEE (AKA) Left 7/31/2019    Procedure: AMPUTATION ABOVE KNEE (AKA);   Surgeon: Ryan Goyal MD;  Location: [facility] [place]; Service: General    ANGIOPLASTY      W/ FLUOROSC ANGIOGRAPGY PERIPHERAL ARTERY ADDITIONAL  LAST ASSESSED [id number]    ANGIOPLASTY / STENTING FEMORAL      TANSCATH INTRAVASCULAR STENT PLACEMENT PERCUTANEOUS FEMORAL     CT BONE MARROW BIOPSY AND ASPIRATION  8/9/2019    FULL THICKNESS SKIN GRAFT      TENDON REPAIR      TOE AMPUTATION Left 12/27/2018    Procedure: AMPUTATION left 4th TOE;  Surgeon: Francie Azar DPM;  Location:  MAIN OR;  Service: Podiatry       Meds/Allergies:    (Not in a hospital admission)  acetaminophen (TYLENOL) 500 mg tablet Take 1 tablet (500 mg total) by mouth every 6 (six) hours as needed for mild pain, headaches or fever Hilario Wells MD Reordered   Ordered as: acetaminophen (TYLENOL) tablet 500 mg - 500 mg, Oral, Every 6 hours PRN, mild pain, headaches, fever, Starting Thu 12/5/19 at 0104   acyclovir (ZOVIRAX) 400 MG tablet Take 1 tablet (400 mg total) by mouth daily Hilario Wells MD Reordered   Ordered as: acyclovir (ZOVIRAX) tablet 400 mg - 400 mg, Oral, Daily, First dose on Thu 12/5/19 at [address] [facility]    albuterol (PROVENTIL HFA,VENTOLIN HFA) 90 mcg/act inhaler Inhale 2 puffs every 6 (six) hours as needed for wheezing Hilario Wells MD Reordered   Ordered as: albuterol (PROVENTIL HFA,VENTOLIN HFA) inhaler 2 puff - 2 puff, Inhalation, Every 6 hours PRN, wheezing, Starting Thu 12/5/19 at [address] [place] [address] well before use SEAL LEFTOVER/UNUSED MEDICATION IN A ZIP LOCK BAG AND SEND TO PHARMACY    allopurinol (ZYLOPRIM) 300 mg tablet TAKE 1 TABLET BY MOUTH DAILY Hilario Wells MD Not Ordered   atorvastatin (LIPITOR) 20 mg tablet TAKE 1 TABLET BY MOUTH ONCE DAILY Hilario Wells MD Reordered   Ordered as: atorvastatin (LIPITOR) tablet 20 mg - 20 mg, Oral, Daily, First dose on Thu 12/5/19 at 0900   bisacodyl (DULCOLAX) 10 mg suppository Insert 10 mg into the rectum as needed for constipation Hilario Wells MD Reordered   Ordered as: bisacodyl (DULCOLAX) rectal suppository 10 mg - 10 mg, Rectal, As needed, constipation, Starting Thu 12/5/19 at 0105   fluticasone-salmeterol (ADVAIR DISKUS, WIXELA INHUB) 250-50 mcg/dose inhaler Inhale 1 puff 2 (two) times a day Rinse mouth after use  Jonelle Salazar MD Reordered   Ordered as: fluticasone-vilanterol (BREO ELLIPTA) 200-25 MCG/INH inhaler 1 puff - 1 puff, Inhalation, Daily (RESP), First dose on Thu 12/5/19 at 0800 Rinse mouth after use  insulin aspart (NOVOLOG FLEXPEN) 100 Units/mL injection pen Inject 30 Units under the skin 3 (three) times a day with meals Jonelle Salazar MD Reordered   Ordered as: insulin lispro (HumaLOG) 100 units/mL subcutaneous injection 30 Units - 30 Units, Subcutaneous, 3 times daily with meals, First dose on Thu 12/5/19 at [address] [facility]  **DISPOSE IN 8 GALLON BLACK CONTAINER**  LOOK ALIKE SOUND ALIKE MED    insulin glargine (LANTUS) 100 units/mL subcutaneous injection Inject 18 Units under the skin daily at bedtime Jonelle Salazar MD Reordered   Ordered as: insulin glargine (LANTUS) subcutaneous injection 18 Units 0 18 mL - 18 Units, Subcutaneous, Daily at bedtime, First dose on Thu 12/5/19 at [address] [facility]  Do not dilute/mix insulin glargine with any other insulin formulation/solution  **DISPOSE IN 8 GALLON BLACK CONTAINER** Do not hold medication without a physician order  metFORMIN (GLUCOPHAGE) 1000 MG tablet  Jonelle Salazar MD Not Ordered   metoprolol tartrate (LOPRESSOR) 25 mg tablet TAKE 1 TABLET BY MOUTH EVERY 12 HOURS Jonelle Salazar MD Reordered   Ordered as: metoprolol tartrate (LOPRESSOR) tablet 25 mg - 25 mg, Oral, Every 12 hours, First dose on Thu 12/5/19 at [address] for heart rate less than 50 beats per minute   LOOK ALIKE SOUND ALIKE MED Hold for systolic blood pressure less than (mmHg): 110   torsemide (DEMADEX) 10 mg tablet Take 1 tablet (10 mg total) by mouth 2 (two) times a day Jonelle Salazar MD Not Ordered warfarin (COUMADIN) 7 5 mg tablet 7 5 mg on August 12th and 13th then INR on August 14th Chyna Aguilar MD Reordered    Patient taking differently: Take 10 mg by mouth      Ordered as: warfarin (COUMADIN) tablet 10 mg - 10 mg, Oral, Daily (warfarin), First dose on Thu 12/5/19 at 1800 High alert medication  Food-drug interaction teaching and documentation must be done  **DISPOSE EMPTY PACKAGING AND LEFTOVER MEDICATION IN 8 GALLON BLACK CONTAINER** Indication: INR goal: 2-3   Alcohol Swabs (ALCOHOL PREP) 70 % PADS  Chyna Aguilar MD Reconciliation Not Required   ammonium lactate (LAC-HYDRIN) 12 % lotion  Chyna Aguilar MD Reordered   Ordered as: ammonium lactate (LAC-HYDRIN) 12 % lotion - Topical, Daily, First dose on Thu 12/5/19 at 0900 For topical use ONLY; AVOID use on eyes, lips, or mucous membranes What is the location for this topical application? dry skin   BD INSULIN SYRINGE U/F 31G X 5/16" 0 5 ML MISC USE AS DIRECTED WITH NOVOLIN 70/30 Chyna Aguilar MD Reconciliation Not Required   BD PEN NEEDLE HILARIO U/F 32G X 4 MM MISC by Other route 3 (three) times a day Chyna Aguilar MD Reconciliation Not Required   bisacodyl (bisacodyl) 5 mg EC tablet Take 5 mg by mouth daily as needed for constipation Chyna Aguilar MD Reordered   Ordered as: bisacodyl (DULCOLAX) EC tablet 5 mg - 5 mg, Oral, Daily PRN, constipation, Starting Thu 12/5/19 at [address] not crush or chew; Do not administer within 1 hour of milk, any dairy product or antacid    bortezomib (VELCADE) Infuse into a venous catheter once Chyna Aguilar MD Not Ordered   clotrimazole (LOTRIMIN) 1 % cream Apply topically 2 (two) times a day Chyna Aguilar MD Reordered   Ordered as: clotrimazole (LOTRIMIN) 1 % cream - Topical, 2 times daily, First dose on Thu 12/5/19 at 0900 For external use only   What is the location for this topical application? dry skin   Insulin Pen Needle 31G X 5 MM MISC by Does not apply route 2 (two) times a day for 50 days Jennifer Gaming MD Reconciliation Not Required       Allergies: Allergies   Allergen Reactions    Latex Rash     History:  Marital Status:    Occupation:  Currently retired  Patient Pre-hospital Living Situation:  Lives in SNF  Patient Pre-hospital Level of Mobility:  Needs assistance  Patient Pre-hospital Diet Restrictions:  Diabetic and cardiac  Substance Use History:   Social History     Substance and Sexual Activity   Alcohol Use Not Currently     Social History     Tobacco Use   Smoking Status Never Smoker   Smokeless Tobacco Never Used     Social History     Substance and Sexual Activity   Drug Use Not Currently       Family History:  Family History   Problem Relation Age of Onset    Other Mother         CARDIAC DISORDER     Diabetes Mother     Cancer Father     Other Family         CARDIAC DISORDER     Diabetes Family     Cancer Family     Mental illness Family     Kidney disease Sister        Physical Exam:     Vitals:   Blood Pressure: 115/58 (12/05/19 0030)  Pulse: 84 (12/05/19 0030)  Temperature: (!) 100 9 °F (38 3 °C) (12/04/19 2238)  Temp Source: Oral (12/04/19 2238)  Respirations: 16 (12/05/19 0028)  Height: 6' 3" (190 5 cm) (12/04/19 2238)  Weight - Scale: (!) 162 kg (357 lb 5 9 oz) (12/04/19 2238)  SpO2: 91 % (12/05/19 0030)    Constitutional:  Well developed, morbidly obese,, no acute distress, non-toxic appearance   Eyes:  PERRL, conjunctiva normal   HENT:  Atraumatic, external ears normal, nose normal, oropharynx moist, no pharyngeal exudates     Neck- normal range of motion, no tenderness, supple   Respiratory:  No respiratory distress, normal breath sounds, no rales, no wheezing   Cardiovascular:  Normal rate, normal rhythm, no murmurs, no gallops, no rubs   GI:  Soft, nondistended, normal bowel sounds, nontender, no organomegaly, no mass, no rebound, no guarding   :  No costovertebral angle tenderness   Musculoskeletal:  No edema, no tenderness, no deformities   Back- no tenderness; mid noted above knee amputation left side  Right-sided we did not mediated redness of the right lower extremity below-the-knee  Integument:  Well hydrated, no rash   Lymphatic:  No lymphadenopathy noted   Neurologic:  Alert &awake, communicative, CN 2-12 normal, normal motor function, normal sensory function, no focal deficits noted   Psychiatric:  Speech and behavior appropriate       Lab Results: I have personally reviewed pertinent reports  Results from last 7 days   Lab Units 12/04/19  2333   WBC Thousand/uL 14 27*   HEMOGLOBIN g/dL 11 4*   HEMATOCRIT % 38 4   PLATELETS Thousands/uL 227   NEUTROS PCT % 81*   LYMPHS PCT % 10*   MONOS PCT % 7   EOS PCT % 0     Results from last 7 days   Lab Units 12/04/19  2333   POTASSIUM mmol/L 4 6   CHLORIDE mmol/L 105   CO2 mmol/L 27   BUN mg/dL 33*   CREATININE mg/dL 1 56*   CALCIUM mg/dL 8 8   ALK PHOS U/L 259*   ALT U/L 25   AST U/L 30     Results from last 7 days   Lab Units 12/04/19  2333   INR  2 07*           Imaging: I have personally reviewed pertinent films in PACS  Possible hilar fullness at the right middle lobe  No results found  ** Please Note: Dragon 360 Dictation voice to text software was used in the creation of this document   **

## 2019-12-05 NOTE — ASSESSMENT & PLAN NOTE
Lab Results   Component Value Date    HGBA1C 9 4 (H) 12/27/2018   Continue insulin glargine 18 units at night  He is also on accumulating 30 units with meals  Check blood sugar status per protocol related incident sliding scale  Metformin was on hold due to elevated creatinine  No results for input(s): POCGLU in the last 72 hours      Blood Sugar Average: Last 72 hrs:

## 2019-12-05 NOTE — QUICK NOTE
D/c'd pt's 30 units of Humalog w/ meals  Pt has not been receiving them today b/c of hold parameters  Changed Lantus from 18 to 10 to prevent hypoglycemia in AM  Will continue to monitor and adjust accordingly

## 2019-12-05 NOTE — ED PROVIDER NOTES
History  Chief Complaint   Patient presents with    Fever - 9 weeks to 76 years     Pt coming from [facility] with a fever  Pt reports it has been going on for about a week now     61-year-old male history of diabetes, peripheral vascular disease, above the left knee patient, multiple myeloma, severe obesity presents with 1 day of reduced urine output and low-grade fever  Patient lives at [facility] where he noted reduced urine output today, normally urinates in a bedside urinal, also noted that it was malodorous  His temperature was taken and he was noted to have a fever, so he was sent here to the ED  Patient normally ambulates with a walker, wheelchair but felt very weak today and was unable to use the walker  Had 1 episode of diarrhea today  Denies chest pain, abdominal pain, shortness of breath, recent illness, sick contacts  Right leg also noted to be red and inflamed  Later accompanied by girlfriend who notes that this started about 5 days ago  Patient has diminished sensation of the right foot, this is not new for him, secondary to diabetic neuropathy  Prior to Admission Medications   Prescriptions Last Dose Informant Patient Reported? Taking?    Alcohol Swabs (ALCOHOL PREP) 70 % PADS  Outside Facility (Specify) Yes No   BD INSULIN SYRINGE U/F 31G X 5/16" 0 5 ML MISC  Outside Facility (Specify) Yes No   Sig: USE AS DIRECTED WITH NOVOLIN 70/30   BD PEN NEEDLE HILARIO U/F 32G X 4 MM MISC  Outside Facility (Specify) No No   Sig: by Other route 3 (three) times a day   Insulin Pen Needle 31G X 5 MM MISC  Outside Facility (Specify) No No   Sig: by Does not apply route 2 (two) times a day for 50 days   acetaminophen (TYLENOL) 500 mg tablet Past Week at Unknown time  No Yes   Sig: Take 1 tablet (500 mg total) by mouth every 6 (six) hours as needed for mild pain, headaches or fever   acyclovir (ZOVIRAX) 400 MG tablet 12/4/2019 at 0800 Outside Facility (Specify) No Yes   Sig: Take 1 tablet (400 mg total) by mouth daily   albuterol (PROVENTIL HFA,VENTOLIN HFA) 90 mcg/act inhaler Past Week at Unknown time Outside Facility (Specify) No Yes   Sig: Inhale 2 puffs every 6 (six) hours as needed for wheezing   allopurinol (ZYLOPRIM) 300 mg tablet 12/4/2019 at 0800 Outside Facility (Specify) No Yes   Sig: TAKE 1 TABLET BY MOUTH DAILY   ammonium lactate (LAC-HYDRIN) 12 % lotion  Outside Facility (Specify) Yes No   atorvastatin (LIPITOR) 20 mg tablet 12/4/2019 at 1700 Outside Facility (Specify) No Yes   Sig: TAKE 1 TABLET BY MOUTH ONCE DAILY   bisacodyl (DULCOLAX) 10 mg suppository Past Week at Unknown time  Yes Yes   Sig: Insert 10 mg into the rectum as needed for constipation   bisacodyl (bisacodyl) 5 mg EC tablet   Yes No   Sig: Take 5 mg by mouth daily as needed for constipation   bortezomib (VELCADE)  Outside Facility (Specify) Yes No   Sig: Infuse into a venous catheter once   clotrimazole (LOTRIMIN) 1 % cream  Outside Facility (Specify) No No   Sig: Apply topically 2 (two) times a day   fluticasone-salmeterol (ADVAIR DISKUS, WIXELA INHUB) 250-50 mcg/dose inhaler 12/4/2019 at 0800 Outside Facility (Specify) No Yes   Sig: Inhale 1 puff 2 (two) times a day Rinse mouth after use     insulin aspart (NOVOLOG FLEXPEN) 100 Units/mL injection pen 12/4/2019 at [address] (Specify) No Yes   Sig: Inject 30 Units under the skin 3 (three) times a day with meals   insulin glargine (LANTUS) 100 units/mL subcutaneous injection 12/3/2019 at Unknown time Outside Facility (Specify) No Yes   Sig: Inject 18 Units under the skin daily at bedtime   metFORMIN (GLUCOPHAGE) 1000 MG tablet 12/4/2019 at 0800 Outside Facility (Specify) Yes Yes   metoprolol tartrate (LOPRESSOR) 25 mg tablet 12/4/2019 at 0800 Outside Facility (Specify) No Yes   Sig: TAKE 1 TABLET BY MOUTH EVERY 12 HOURS   torsemide (DEMADEX) 10 mg tablet 12/4/2019 at 0800 Outside Facility (Specify) No Yes   Sig: Take 1 tablet (10 mg total) by mouth 2 (two) times a day   warfarin (COUMADIN) 7 5 mg tablet 12/3/2019 at [facility]) No Yes   Si 5 mg on  and  then INR on    Patient taking differently: Take 10 mg by mouth       Facility-Administered Medications: None       Past Medical History:   Diagnosis Date    Cancer [facility])     [facility]congestive heart failure) ([facility])     Chronic kidney disease     [facility]ch[facility]     Decubitus ulcer of heel     LAST ASSESSED [id number]    Diabetes mellitus ([place] [address] )     History of varicose veins     Hypertension     Intermittent claudication ([place] [address] )     Neuropathy     Seasonal allergies        Past Surgical History:   Procedure Laterality Date    AMPUTATION ABOVE KNEE (AKA) Left 2019    Procedure: AMPUTATION ABOVE KNEE (AKA); Surgeon: Leo Chua MD;  Location: [facility];  Service: General    ANGIOPLASTY      W/ FLUOROSC ANGIOGRAPGY PERIPHERAL ARTERY ADDITIONAL  LAST ASSESSED [id number]    ANGIOPLASTY / STENTING FEMORAL      TANSCATH INTRAVASCULAR STENT PLACEMENT PERCUTANEOUS FEMORAL     CT BONE MARROW BIOPSY AND ASPIRATION  2019    FULL THICKNESS SKIN GRAFT      TENDON REPAIR      TOE AMPUTATION Left 2018    Procedure: AMPUTATION left 4th TOE;  Surgeon: Rory Rosenthal DPM;  Location: [facility];  Service: Podiatry       Family History   Problem Relation Age of Onset    Other Mother         CARDIAC DISORDER     Diabetes Mother     Cancer Father     Other Family         CARDIAC DISORDER     Diabetes Family     Cancer Family     Mental illness Family     Kidney disease Sister      I have reviewed and agree with the history as documented  Social History     Tobacco Use    Smoking status: Never Smoker    Smokeless tobacco: Never Used   Substance Use Topics    Alcohol use: Not Currently    Drug use: Not Currently        Review of Systems   Constitutional: Positive for fever  Negative for chills     HENT: Negative for ear pain, sinus pain and sore throat  Eyes: Negative for pain  Respiratory: Negative for shortness of breath  Cardiovascular: Negative for chest pain  Gastrointestinal: Positive for diarrhea  Negative for abdominal pain, nausea and vomiting  Genitourinary: Positive for difficulty urinating  Negative for dysuria and flank pain  Musculoskeletal: Negative for back pain and neck pain  Psychiatric/Behavioral: Negative  All other systems reviewed and are negative  Physical Exam  ED Triage Vitals [12/04/19 2238]   Temperature Pulse Respirations Blood Pressure SpO2   (!) 100 9 °F (38 3 °C) 83 19 112/55 93 %      Temp Source Heart Rate Source Patient Position - Orthostatic VS BP Location FiO2 (%)   Oral Monitor Lying Right arm --      Pain Score       No Pain                     Orthostatic Vital Signs  Vitals:    12/05/19 0600 12/05/19 1000 12/05/19 1453 12/05/19 1517   BP: 121/55 142/58 (!) 172/79 167/75   Pulse: 66 68 74 80   Patient Position - Orthostatic VS: Lying Lying         Physical Exam   Constitutional: He appears well-developed and well-nourished  No distress  HENT:   Head: Normocephalic  Nose: Nose normal    Mouth/Throat: Oropharynx is clear and moist    Eyes: Conjunctivae and EOM are normal  Right eye exhibits no discharge  Left eye exhibits no discharge  No scleral icterus  Neck: Normal range of motion  Cardiovascular: Normal rate and normal heart sounds  Pulmonary/Chest: Effort normal  No stridor  No respiratory distress  He has no wheezes  He has no rales  Abdominal: Soft  Bowel sounds are normal  He exhibits no distension  There is no tenderness  There is no rebound and no guarding  Genitourinary:   Genitourinary Comments: Small amount of whitish discharge noted at meatus   Musculoskeletal:   Left aka   Neurological: He is alert  No cranial nerve deficit  Skin: Skin is warm and dry  He is not diaphoretic  There is erythema     Psychiatric: He has a normal mood and affect  His behavior is normal    Nursing note and vitals reviewed        ED Medications  Medications   sodium chloride (PF) 0 9 % injection 3 mL (has no administration in time range)   acetaminophen (TYLENOL) tablet 650 mg (has no administration in time range)   albuterol (PROVENTIL HFA,VENTOLIN HFA) inhaler 2 puff (has no administration in time range)   ammonium lactate (LAC-HYDRIN) 12 % lotion (1 application Topical Given 12/5/19 0826)   atorvastatin (LIPITOR) tablet 20 mg (20 mg Oral Given 12/5/19 1716)   bisacodyl (DULCOLAX) EC tablet 5 mg (has no administration in time range)   bisacodyl (DULCOLAX) rectal suppository 10 mg (has no administration in time range)   clotrimazole (LOTRIMIN) 1 % cream ( Topical Not Given 12/5/19 1716)   fluticasone-vilanterol (BREO ELLIPTA) 200-25 MCG/INH inhaler 1 puff (1 puff Inhalation Given 12/5/19 0826)   metoprolol tartrate (LOPRESSOR) tablet 25 mg (25 mg Oral Given 12/5/19 0330)   ondansetron (ZOFRAN) injection 4 mg (has no administration in time range)   aluminum-magnesium hydroxide-simethicone (MYLANTA) 200-200-20 mg/5 mL oral suspension 15 mL (has no administration in time range)   insulin lispro (HumaLOG) 100 units/mL subcutaneous injection 1-6 Units (1 Units Subcutaneous Given 12/5/19 1716)   insulin lispro (HumaLOG) 100 units/mL subcutaneous injection 1-5 Units (has no administration in time range)   warfarin (COUMADIN) tablet 7 5 mg (7 5 mg Oral Given 12/5/19 1716)   acyclovir (ZOVIRAX) capsule 400 mg (has no administration in time range)   ceFAZolin (ANCEF) IVPB (premix) 2,000 mg (has no administration in time range)   insulin glargine (LANTUS) subcutaneous injection 10 Units 0 1 mL (has no administration in time range)   lactated ringers bolus 500 mL (0 mL Intravenous Stopped 12/5/19 0100)   cefepime (MAXIPIME) 2 g/50 mL dextrose IVPB (0 mg Intravenous Stopped 12/5/19 0037)   vancomycin (VANCOCIN) 1,750 mg in sodium chloride 0 9 % 500 mL IVPB (0 mg/kg × 116 kg (Adjusted) Intravenous Stopped 12/5/19 0305)       Diagnostic Studies  Results Reviewed     Procedure Component Value Units Date/Time    Protime-INR [[id number]]     Lab Status:  No result Specimen:  Blood     APTT [[id number]]     Lab Status:  No result Specimen:  Blood     Fingerstick Glucose (POCT) [[id number]]  (Abnormal) Collected:  12/05/19 1105    Lab Status:  Final result Updated:  12/05/19 1107     POC Glucose 168 mg/dl     Blood culture #1 [[id number]] Collected:  12/04/19 2333    Lab Status:  Preliminary result Specimen:  Blood from Arm, Right Updated:  12/05/19 0903     Blood Culture Received in Microbiology Lab  Culture in Progress  Blood culture #2 [[id number]] Collected:  12/04/19 2333    Lab Status:  Preliminary result Specimen:  Blood from Arm, Right Updated:  12/05/19 7945     Blood Culture Received in Microbiology Lab  Culture in Progress  Fingerstick Glucose (POCT) [[id number]]  (Normal) Collected:  12/05/19 0707    Lab Status:  Final result Updated:  12/05/19 0708     POC Glucose 106 mg/dl     TSH, 3rd generation [[id number]]  (Normal) Collected:  12/05/19 0505    Lab Status:  Final result Specimen:  Blood from Arm, Left Updated:  12/05/19 0546     TSH 3RD GENERATON 1 210 uIU/mL     Narrative:       Patients undergoing fluorescein dye angiography may retain small amounts of fluorescein in the body for 48-72 hours post procedure  Samples containing fluorescein can produce falsely depressed TSH values  If the patient had this procedure,a specimen should be resubmitted post fluorescein clearance        Comprehensive metabolic panel [[id number]]  (Abnormal) Collected:  12/05/19 0505    Lab Status:  Final result Specimen:  Blood from Arm, Left Updated:  12/05/19 0546     Sodium 138 mmol/L      Potassium 4 2 mmol/L      Chloride 107 mmol/L      CO2 24 mmol/L      ANION GAP 7 mmol/L      BUN 32 mg/dL      Creatinine 1 41 mg/dL      Glucose 93 mg/dL      Calcium 8 0 mg/dL      AST 36 U/L      ALT 23 U/L Alkaline Phosphatase 223 U/L      Total Protein 6 5 g/dL      Albumin 2 7 g/dL      Total Bilirubin 1 26 mg/dL      eGFR 51 ml/min/1 73sq m     Narrative:       [place] guidelines for Chronic Kidney Disease (CKD):     Stage 1 with normal or high GFR (GFR > 90 mL/min/1 73 square meters)    Stage 2 Mild CKD (GFR = 60-89 mL/min/1 73 square meters)    Stage 3A Moderate CKD (GFR = 45-59 mL/min/1 73 square meters)    Stage 3B Moderate CKD (GFR = 30-44 mL/min/1 73 square meters)    Stage 4 Severe CKD (GFR = 15-29 mL/min/1 73 square meters)    Stage 5 End Stage CKD (GFR <15 mL/min/1 73 square meters)  Note: GFR calculation is accurate only with a steady state creatinine    Magnesium [[id number]]  (Normal) Collected:  12/05/19 0505    Lab Status:  Final result Specimen:  Blood from Arm, Left Updated:  12/05/19 0537     Magnesium 1 6 mg/dL     Phosphorus [[id number]]  (Normal) Collected:  12/05/19 0505    Lab Status:  Final result Specimen:  Blood from Arm, Left Updated:  12/05/19 0537     Phosphorus 3 7 mg/dL     Hemoglobin A1c w/EAG Estimation (Orders if not completed within the last 90 days) [[id number]] Collected:  12/05/19 0505    Lab Status:  Final result Specimen:  Blood from Arm, Left Updated:  12/05/19 0537     Hemoglobin A1C 6 1 %       mg/dl     CBC and differential [[id number]]  (Abnormal) Collected:  12/05/19 0505    Lab Status:  Final result Specimen:  Blood from Arm, Left Updated:  12/05/19 0516     WBC 13 65 Thousand/uL      RBC 3 79 Million/uL      Hemoglobin 10 5 g/dL      Hematocrit 35 0 %      MCV 92 fL      MCH 27 7 pg      MCHC 30 0 g/dL      RDW 16 3 %      MPV 11 1 fL      Platelets 869 Thousands/uL      nRBC 0 /100 WBCs      Neutrophils Relative 80 %      Immat GRANS % 1 %      Lymphocytes Relative 10 %      Monocytes Relative 8 %      Eosinophils Relative 0 %      Basophils Relative 1 %      Neutrophils Absolute 11 06 Thousands/µL      Immature Grans Absolute 0 08 Thousand/uL      Lymphocytes Absolute 1 32 Thousands/µL      Monocytes Absolute 1 06 Thousand/µL      Eosinophils Absolute 0 06 Thousand/µL      Basophils Absolute 0 07 Thousands/µL     Fingerstick Glucose (POCT) [[id number]]  (Normal) Collected:  12/05/19 0308    Lab Status:  Final result Updated:  12/05/19 0309     POC Glucose 96 mg/dl     Lactic acid x2 [[id number]]  (Normal) Collected:  12/05/19 0130    Lab Status:  Final result Specimen:  Blood from Hand, Left Updated:  12/05/19 0211     LACTIC ACID 1 3 mmol/L     Narrative:       Result may be elevated if tourniquet was used during collection  Procalcitonin [[id number]]  (Abnormal) Collected:  12/04/19 2333    Lab Status:  Final result Specimen:  Blood from Arm, Right Updated:  12/05/19 0106     Procalcitonin 0 33 ng/ml     Urine Microscopic [[id number]]  (Abnormal) Collected:  12/05/19 0001    Lab Status:  Final result Specimen:  Urine, Clean Catch Updated:  12/05/19 0052     RBC, UA 4-10 /hpf      WBC, UA Innumerable /hpf      Epithelial Cells Occasional /hpf      Bacteria, UA Occasional /hpf     Urine culture [[id number]] Collected:  12/05/19 0001    Lab Status: In process Specimen:  Urine, Clean Catch Updated:  12/05/19 0052    Lactic acid x2 [[id number]]  (Abnormal) Collected:  12/04/19 2333    Lab Status:  Final result Specimen:  Blood from Arm, Right Updated:  12/05/19 0029     LACTIC ACID 2 2 mmol/L     Narrative:       Result may be elevated if tourniquet was used during collection      UA w Reflex to Microscopic w Reflex to Culture [[id number]]  (Abnormal) Collected:  12/05/19 0001    Lab Status:  Final result Specimen:  Urine, Clean Catch Updated:  12/05/19 0025     Color, UA Yellow     Clarity, UA Turbid     Specific [place], UA 1 011     pH, UA 5 0     Leukocytes, UA Large     Nitrite, UA Negative     Protein, UA Trace mg/dl      Glucose, UA Negative mg/dl      Ketones, UA Negative mg/dl      Urobilinogen, UA 1 0 E U /dl      Bilirubin, UA Negative     Blood, UA Large    Comprehensive metabolic panel [[id number]]  (Abnormal) Collected:  12/04/19 2333    Lab Status:  Final result Specimen:  Blood from Arm, Right Updated:  12/05/19 0022     Sodium 138 mmol/L      Potassium 4 6 mmol/L      Chloride 105 mmol/L      CO2 27 mmol/L      ANION GAP 6 mmol/L      BUN 33 mg/dL      Creatinine 1 56 mg/dL      Glucose 72 mg/dL      Calcium 8 8 mg/dL      AST 30 U/L      ALT 25 U/L      Alkaline Phosphatase 259 U/L      Total Protein 7 5 g/dL      Albumin 3 4 g/dL      Total Bilirubin 1 16 mg/dL      eGFR 45 ml/min/1 73sq m     Narrative:       [facility] guidelines for Chronic Kid[facility] Disease (CKD):     Stage 1 with normal or high GFR (GFR > 90 mL/min/1 73 square meters)    Stage 2 Mild CKD (GFR = 60-89 mL/min/1 73 square meters)    Stage 3A Moderate CKD (GFR = 45-59 mL/min/1 73 square meters)    Stage 3B Moderate CKD (GFR = 30-44 mL/min/1 73 square meters)    Stage 4 Severe CKD (GFR = 15-29 mL/min/1 73 square meters)    Stage 5 End Stage CKD (GFR <15 mL/min/1 73 square meters)  Note: GFR calculation is accurate only with a steady state creatinine    Protime-INR [[id number]]  (Abnormal) Collected:  12/04/19 2333    Lab Status:  Final result Specimen:  Blood from Arm, Right Updated:  12/05/19 0008     Protime 22 8 seconds      INR 2 07    APTT [[id number]]  (Abnormal) Collected:  12/04/19 2333    Lab Status:  Final result Specimen:  Blood from Arm, Right Updated:  12/05/19 0008     PTT 53 seconds     CBC and differential [[id number]]  (Abnormal) Collected:  12/04/19 2333    Lab Status:  Final result Specimen:  Blood from Arm, Right Updated:  12/04/19 2353     WBC 14 27 Thousand/uL      RBC 4 12 Million/uL      Hemoglobin 11 4 g/dL      Hematocrit 38 4 %      MCV 93 fL      MCH 27 7 pg      MCHC 29 7 g/dL      RDW 16 1 %      MPV 10 8 fL      Platelets 778 Thousands/uL      nRBC 0 /100 WBCs      Neutrophils Relative 81 %      Immat GRANS % 1 %      Lymphocytes Relative 10 %      Monocytes Relative 7 %      Eosinophils Relative 0 %      Basophils Relative 1 %      Neutrophils Absolute 11 59 Thousands/µL      Immature Grans Absolute 0 08 Thousand/uL      Lymphocytes Absolute 1 43 Thousands/µL      Monocytes Absolute 1 05 Thousand/µL      Eosinophils Absolute 0 05 Thousand/µL      Basophils Absolute 0 07 Thousands/µL                  XR tibia fibula 2 vw right   Final Result by Bradley 6, DO (12/05 1422)      No acute osseous abnormality  Degenerative changes as described  Workstation performed: [id number]         XR chest portable   Final Result by Naomie Strong MD (73/58 1649)      Question left base opacity  If this is a real finding, it could be due to atelectasis or pneumonia  Workstation performed: [id number]               Procedures  Procedures      ED Course  ED Course as of Dec 05 2053   Thu Dec 05, 2019   0120 XR chest portable   0120 XR chest portable           Identification of Seniors at Risk      Most Recent Value   (ISAR) Identification of Seniors at Risk   Before the illness or injury that brought you to the Emergency, did you need someone to help you on a regular basis? 1 Filed at: 12/04/2019 2240   In the last 24 hours, have you needed more help than usual?  1 Filed at: 12/04/2019 2240   Have you been hospitalized for one or more nights during the past 6 months? 1 Filed at: 12/04/2019 2240   In general, do you see well?  0 Filed at: 12/04/2019 2240   In general, do you have serious problems with your memory? 0 Filed at: 12/04/2019 2240   Do you take more than three different medications every day?   1 Filed at: 12/04/2019 2240   ISAR Score  4 Filed at: 12/04/2019 2240                          MDM  Number of Diagnoses or Management Options  Diagnosis management comments: Patient with fever likely septic with possible sources including cellulitic right leg versus urinary tract infection versus pneumonia  Patient does sat in a 93-94 range  Initiating sepsis panel with conservative fluid administration of 500 cc lactated Ringer's  Vancomycin and cefepime  Disposition  Final diagnoses:   Cellulitis of right lower extremity   Chronic venous hypertension, right     Time reflects when diagnosis was documented in both MDM as applicable and the Disposition within this note     Time User Action Codes Description Comment    12/5/2019  1:15 AM Ramonia Bussing Add [Q62 642] Cellulitis of right lower extremity     12/5/2019  1:15 AM Ramonia Bussing Modify [L03 115] Cellulitis of right lower extremity     12/5/2019 12:30 PM Ilene Sibley Add [I87 301] Chronic venous hypertension, right       ED Disposition     None      Follow-up Information    None         Current Discharge Medication List      CONTINUE these medications which have NOT CHANGED    Details   acetaminophen (TYLENOL) 500 mg tablet Take 1 tablet (500 mg total) by mouth every 6 (six) hours as needed for mild pain, headaches or fever  Qty: 90 tablet, Refills: 1    Associated Diagnoses: Diabetic polyneuropathy associated with type 2 diabetes mellitus (HCC)      acyclovir (ZOVIRAX) 400 MG tablet Take 1 tablet (400 mg total) by mouth daily  Qty: 30 tablet, Refills: 3    Associated Diagnoses: Multiple myeloma not having achieved remission ([facility])      albuterol (PROVENTIL HFA,VENTOLIN HFA) 90 mcg/act inhaler Inhale 2 puffs every 6 (six) hours as needed for wheezing  Qty: 1 Inhaler, Refills: 0    Comments: Substitution to a formulary equivalent within the same pharmaceutical class is authorized  Associated Diagnoses:  Moderate persistent asthma without complication      allopurinol (ZYLOPRIM) 300 mg tablet TAKE 1 TABLET BY MOUTH DAILY  Qty: 90 tablet, Refills: 1    Associated Diagnoses: Chronic gout without tophus, unspecified cause, unspecified site      atorvastatin (LIPITOR) 20 mg tablet TAKE 1 TABLET BY MOUTH ONCE DAILY  Qty: 90 tablet, Refills: 0    Associated Diagnoses: Hyperlipidemia LDL goal <70      bisacodyl (DULCOLAX) 10 mg suppository Insert 10 mg into the rectum as needed for constipation      fluticasone-salmeterol (ADVAIR DISKUS, WIXELA INHUB) 250-50 mcg/dose inhaler Inhale 1 puff 2 (two) times a day Rinse mouth after use  Qty: 1 Inhaler, Refills: 5    Comments: Substitution to a formulary equivalent within the same pharmaceutical class is authorized  Associated Diagnoses: Moderate persistent asthma without complication      insulin aspart (NOVOLOG FLEXPEN) 100 Units/mL injection pen Inject 30 Units under the skin 3 (three) times a day with meals  Qty: 15 pen, Refills: 1    Associated Diagnoses: Uncontrolled type 2 diabetes mellitus with hyperglycemia (HCC)      insulin glargine (LANTUS) 100 units/mL subcutaneous injection Inject 18 Units under the skin daily at bedtime  Qty: 10 mL, Refills: 0    Associated Diagnoses: Acute renal failure superimposed on stage 3 chronic kidney disease, unspecified acute renal failure type ([facility] );  Type 2 diabetes mellitus with left diabetic foot ulcer (HCC)      metFORMIN (GLUCOPHAGE) 1000 MG tablet       metoprolol tartrate (LOPRESSOR) 25 mg tablet TAKE 1 TABLET BY MOUTH EVERY 12 HOURS  Qty: 180 tablet, Refills: 3    Associated Diagnoses: Essential hypertension      torsemide (DEMADEX) 10 mg tablet Take 1 tablet (10 mg total) by mouth 2 (two) times a day    Associated Diagnoses: Localized edema      warfarin (COUMADIN) 7 5 mg tablet 7 5 mg on August 12th and 13th then INR on August 14th  Qty: 30 tablet, Refills: 0    Associated Diagnoses: Chronic atrial fibrillation      Alcohol Swabs (ALCOHOL PREP) 70 % PADS Refills: 0      ammonium lactate (LAC-HYDRIN) 12 % lotion Refills: 99      BD INSULIN SYRINGE U/F 31G X 5/16" 0 5 ML MISC USE AS DIRECTED WITH NOVOLIN 70/30  Refills: 0      BD PEN NEEDLE HILARIO U/F 32G X 4 MM MISC by Other route 3 (three) times a day  Qty: 300 each, Refills: 1    Associated Diagnoses: Uncontrolled type 2 diabetes mellitus with hyperglycemia (HCC)      bisacodyl (bisacodyl) 5 mg EC tablet Take 5 mg by mouth daily as needed for constipation      bortezomib (VELCADE) Infuse into a venous catheter once      clotrimazole (LOTRIMIN) 1 % cream Apply topically 2 (two) times a day  Qty: 30 g, Refills: 0    Associated Diagnoses: Left leg cellulitis           No discharge procedures on file  ED Provider  Attending physically available and evaluated Cassandra Velarde I managed the patient along with the ED Attending      Electronically Signed by         Mathew Pritchett MD  12/05/19 8488

## 2019-12-05 NOTE — ED ATTENDING ATTESTATION
12/4/2019  Adis Vieira DO, saw and evaluated the patient  I have discussed the patient with the resident/non-physician practitioner and agree with the resident's/non-physician practitioner's findings, Plan of Care, and MDM as documented in the resident's/non-physician practitioner's note, except where noted  All available labs and Radiology studies were reviewed  I was present for key portions of any procedure(s) performed by the resident/non-physician practitioner and I was immediately available to provide assistance  At this point I agree with the current assessment done in the Emergency Department  I have conducted an independent evaluation of this patient a history and physical is as follows:    82-year-old male presents to the emergency department with fever and cough  Additionally, he has worsening warmth erythema of right lower extremity  Past Medical History:   Diagnosis Date    Cancer [facility])     CHF (congestive heart failure) (HCC)     Chronic kidney disease     COPD (chronic obstructive pulmonary disease) (HCC)     Decubitus ulcer of heel     LAST ASSESSED [id number]    Diabetes mellitus (HCC)     History of varicose veins     Hypertension     Intermittent claudication (HCC)     Neuropathy     Seasonal allergies        /55 (BP Location: Right arm)   Pulse 83   Temp (!) 100 9 °F (38 3 °C) (Oral)   Resp 19   Ht 6' 3" (1 905 m)   Wt (!) 162 kg (357 lb 5 9 oz)   SpO2 93%   BMI 44 67 kg/m²   Appears ill, febrile found Alert and oriented x4, breath sounds decreased at bases, heart rate regular, abdomen soft nontender, left AKA, right lower extremity with 2+ pitting edema in chronic cellulitic changes with acute appearing erythema and tenderness  Presentation consistent with sepsis secondary to cellulitis versus pneumonia  He has recently been admitted for pneumonia  Will treat for HCAP and follow sepsis protocols          ED Course         Critical Care Time  Procedures

## 2019-12-05 NOTE — ASSESSMENT & PLAN NOTE
Lab Results   Component Value Date    HGBA1C 6 1 12/05/2019       Recent Labs     12/05/19  0308 12/05/19  0707   POCGLU 96 106       Blood Sugar Average: Last 72 hrs:  (P) 101     · W/ neuropathy   · Continue insulin glargine 18 units at night  And 30 units with meals  · Metformin was on hold while inpt

## 2019-12-05 NOTE — ED NOTES
Pt refused straight cath   Pt urinated on his own in urinal [facility] [address]  [address]  12/05/19 2089

## 2019-12-05 NOTE — ASSESSMENT & PLAN NOTE
· Pt w/ hx of DM, obesity, PVD, AKA L side, and multiple myeloma currently receiving chemo admitted 12/5 for fevers, weakness, and worsening redness of R lower leg on chronic skin changes  Febrile and WBC on admission  · Started on cefepime / vancomycin as per sepsis protocol  · Infectious disease consult  · Blood culture on board  Urine culture pending  · Lactic acid improving  WBC improving  PCT   33 on admission

## 2019-12-05 NOTE — CONSULTS
Consultation - Infectious Disease   Kavya iRchmond 81 y o  male MRN: [id number]  Unit/Bed#: -01 Encounter: [id number]      IMPRESSION & RECOMMENDATIONS:   Impression/Recommendations:  1  Sepsis  POA:  Fever, leukocytosis  Consider due to # 2  Consider acute viral infection given rhinorrhea, sore throat, diarrhea  Consider UTI given pyuria although denies dysuria   UA, chest x-ray unremarkable  Procalcitonin negative for sepsis threshold  Clinically stable and nontoxic  Improving with antibiotics  Rec:  · Continue antibiotics as below  · Follow up final blood cultures from admission  · Follow up urine cultures although in absence of symptoms would not necessarily treat  · Follow temperatures closely  · Recheck CBC, procalcitonin in a m  · Supportive care as per the primary service    2  Right leg non-purulent cellulitis  In setting of #3  Consider streptococcal infection given well-demarcated erythema  Rec:  · Attempt to narrow antibiotics to cefazolin  · Continue with serial exams  · Leg elevation while in house  · Needs better outpatient edema control    3  Chronic lymphedema  With xerosis, venous stasis dermatitis  Risk factor for above  Rec:  · Continue Lac-Hydrin lotion  · Leg elevation while in house  · Needs better outpatient edema control    4  Mild DORA on CKD  Baseline Cr 1 2-1 4  Likely due to dehydration in setting of recent diarrhea, decreased PO intake  Rec:  · Follow creatinine closely and dose-adjust antibiotics as indicated  · Recheck BMP in AM    5  DM with DPN  A1c 6 1  Risk factor for infection  6  PAD  Status post left AKA    7  MO     8   MM  On Velcade, Decadron  Missed recent cycle    The above impression and plan was discussed in detail with the primary service  Antibiotics:  Vancomycin/cefepime # 1    Thank you for this consultation  We will follow along with you      HISTORY OF PRESENT ILLNESS:  Reason for Consult:  Cellulitis    HPI: Mason Douglas Francesco Martinez is a 81 y o  male with DM with DPN, PAD status post left AKA, MO, and chronic right lower extremity lymphedema  He has a recent admission to [address] [place] [address] in late October for right lower extremity cellulitis  He was treated with broad-spectrum antibiotics  The patient is somewhat of a poor historian but came to the emergency department yesterday for fever and 1 week of increased weakness  Patient has chronic swelling of his right leg and is unsure if it is actually more painful or red than usual   I do not usually look added  When asked specifically he states he has had some mild sore throat, rhinorrhea, and diarrhea over the past several days  He states that because of his weakness and desire to avoid having to go to the bathroom, he decreased his water intake and therefore had decreased urine output  Upon presentation he was noted to be febrile with a normal heart rate  His labs revealed a leukocytosis and lactic acidosis  His procalcitonin was mildly elevated  He was started on vancomycin and cefepime for possible cellulitis  We are asked to comment on further evaluation  In performing this consult, I have reviewed prior admission and outpatient visit records in detail  REVIEW OF SYSTEMS:  Patient has chronic right leg edema  Denies wearing compression stockings or wraps  A complete system-based review of systems is otherwise negative      PAST MEDICAL HISTORY:  Past Medical History:   Diagnosis Date    Cancer [facility])     [facility]     Chronic kidney disease     COPD [facility]chronic obstructive pulmonary disease) [facility]     Decubitus ulcer of heel     LAST ASSESSED [id number]    Diabetes mellitus ([facility] [address] )     History of varicose veins     Hypertension     Intermittent claudication (HCC)     Neuropathy     Seasonal allergies      Past Surgical History:   Procedure Laterality Date    AMPUTATION ABOVE KNEE (AKA) Left 7/31/2019    Procedure: AMPUTATION ABOVE KNEE (AKA); Surgeon: Kelle Tompkins MD;  Location:  MAIN OR;  Service: General    ANGIOPLASTY      W/ FLUOROSC ANGIOGRAPGY PERIPHERAL ARTERY ADDITIONAL  LAST ASSESSED [id number]    ANGIOPLASTY / STENTING FEMORAL      TANSCATH INTRAVASCULAR STENT PLACEMENT PERCUTANEOUS FEMORAL     CT BONE MARROW BIOPSY AND ASPIRATION  2019    FULL THICKNESS SKIN GRAFT      TENDON REPAIR      TOE AMPUTATION Left 2018    Procedure: AMPUTATION left 4th TOE;  Surgeon: Nico Vasquez DPM;  Location: [facility] MAIN [facility];  Service: Podiatry       FAMILY HISTORY:  Non-contributory    SOCIAL HISTORY:  Social History     Substance and Sexual Activity   Alcohol Use Not Currently     Social History     Substance and Sexual Activity   Drug Use Not Currently     Social History     Tobacco Use   Smoking Status Never Smoker   Smokeless Tobacco Never Used       ALLERGIES:  Allergies   Allergen Reactions    Latex Rash       MEDICATIONS:  All current active medications have been reviewed  PHYSICAL EXAM:  Vitals:  Temp:  [98 1 °F (36 7 °C)-100 9 °F (38 3 °C)] 99 6 °F (37 6 °C)  HR:  [66-89] 80  Resp:  [14-19] 16  BP: (112-172)/(54-82) 167/75  SpO2:  [91 %-96 %] 93 %  Temp (24hrs), Av 7 °F (37 6 °C), Min:98 1 °F (36 7 °C), Max:100 9 °F (38 3 °C)  Current: Temperature: 99 6 °F (37 6 °C)     Physical Exam:  General:  Obese, slightly disheveled male, in no acute distress  Eyes:  Conjunctive clear with no hemorrhages or effusions  Oropharynx:  No ulcers, no lesions  Neck:  Supple, no lymphadenopathy  Lungs:  Clear to auscultation bilaterally anteriorly, no accessory muscle use  Cardiac:  Regular rate and rhythm, no murmurs  Abdomen:  Soft, non-tender, non-distended, obese  Extremities:  No peripheral cyanosis, clubbing  Status post left AKA  Brawny nonpitting edema right leg with xerosis and excoriation  Warmth and blanching erythema extending from ankle to upper calf    Appears somewhat similar to photos from October hospitalization  Skin:  No rashes, no ulcers  Neurological:  Moves all four extremities spontaneously, sensation grossly intact    LABS, IMAGING, & OTHER STUDIES:  Lab Results:  I have personally reviewed pertinent labs  Results from last 7 days   Lab Units 12/05/19  0505 12/04/19  2333 12/02/19  1202   POTASSIUM mmol/L 4 2 4 6 4 3   CHLORIDE mmol/L 107 105 110*   CO2 mmol/L 24 27 28   BUN mg/dL 32* 33* 29*   CREATININE mg/dL 1 41* 1 56* 1 10   EGFR ml/min/1 73sq m 51 45 69   CALCIUM mg/dL 8 0* 8 8 9 7   AST U/L 36 30 15   ALT U/L 23 25 17   ALK PHOS U/L 223* 259* 200*     Results from last 7 days   Lab Units 12/05/19  0505 12/04/19  2333 12/02/19  1202   WBC Thousand/uL 13 65* 14 27* 6 45   HEMOGLOBIN g/dL 10 5* 11 4* 12 4   PLATELETS Thousands/uL 208 227 244     Results from last 7 days   Lab Units 12/04/19  2333   BLOOD CULTURE  Received in Microbiology Lab  Culture in Progress  Received in Microbiology Lab  Culture in Progress  Imaging Studies:   I have personally reviewed pertinent imaging study reports and images in PACS  CXR personally reviewed left base obscured by heart, right lung clear    EKG, Pathology, and Other Studies:   I have personally reviewed pertinent reports

## 2019-12-06 PROBLEM — I89.0 LYMPHEDEMA: Chronic | Status: ACTIVE | Noted: 2019-12-06

## 2019-12-06 LAB
ANION GAP SERPL CALCULATED.3IONS-SCNC: 8 MMOL/L (ref 4–13)
APTT PPP: 59 SECONDS (ref 23–37)
ATRIAL RATE: 267 BPM
BASOPHILS # BLD AUTO: 0.07 THOUSANDS/ΜL (ref 0–0.1)
BASOPHILS NFR BLD AUTO: 1 % (ref 0–1)
BUN SERPL-MCNC: 33 MG/DL (ref 5–25)
CALCIUM SERPL-MCNC: 8.5 MG/DL (ref 8.3–10.1)
CHLORIDE SERPL-SCNC: 108 MMOL/L (ref 100–108)
CO2 SERPL-SCNC: 24 MMOL/L (ref 21–32)
CREAT SERPL-MCNC: 1.17 MG/DL (ref 0.6–1.3)
EOSINOPHIL # BLD AUTO: 0.11 THOUSAND/ΜL (ref 0–0.61)
EOSINOPHIL NFR BLD AUTO: 1 % (ref 0–6)
ERYTHROCYTE [DISTWIDTH] IN BLOOD BY AUTOMATED COUNT: 15.9 % (ref 11.6–15.1)
GFR SERPL CREATININE-BSD FRML MDRD: 64 ML/MIN/1.73SQ M
GLUCOSE SERPL-MCNC: 136 MG/DL (ref 65–140)
GLUCOSE SERPL-MCNC: 160 MG/DL (ref 65–140)
GLUCOSE SERPL-MCNC: 164 MG/DL (ref 65–140)
GLUCOSE SERPL-MCNC: 166 MG/DL (ref 65–140)
GLUCOSE SERPL-MCNC: 198 MG/DL (ref 65–140)
HCT VFR BLD AUTO: 36.1 % (ref 36.5–49.3)
HGB BLD-MCNC: 10.9 G/DL (ref 12–17)
IMM GRANULOCYTES # BLD AUTO: 0.05 THOUSAND/UL (ref 0–0.2)
IMM GRANULOCYTES NFR BLD AUTO: 1 % (ref 0–2)
INR PPP: 2.14 (ref 0.84–1.19)
LYMPHOCYTES # BLD AUTO: 0.9 THOUSANDS/ΜL (ref 0.6–4.47)
LYMPHOCYTES NFR BLD AUTO: 10 % (ref 14–44)
MCH RBC QN AUTO: 28.3 PG (ref 26.8–34.3)
MCHC RBC AUTO-ENTMCNC: 30.2 G/DL (ref 31.4–37.4)
MCV RBC AUTO: 94 FL (ref 82–98)
MONOCYTES # BLD AUTO: 0.74 THOUSAND/ΜL (ref 0.17–1.22)
MONOCYTES NFR BLD AUTO: 8 % (ref 4–12)
NEUTROPHILS # BLD AUTO: 7.03 THOUSANDS/ΜL (ref 1.85–7.62)
NEUTS SEG NFR BLD AUTO: 79 % (ref 43–75)
NRBC BLD AUTO-RTO: 0 /100 WBCS
PLATELET # BLD AUTO: 210 THOUSANDS/UL (ref 149–390)
PMV BLD AUTO: 11.4 FL (ref 8.9–12.7)
POTASSIUM SERPL-SCNC: 3.9 MMOL/L (ref 3.5–5.3)
PROCALCITONIN SERPL-MCNC: 0.33 NG/ML
PROTHROMBIN TIME: 23.4 SECONDS (ref 11.6–14.5)
QRS AXIS: 66 DEGREES
QRSD INTERVAL: 110 MS
QT INTERVAL: 386 MS
QTC INTERVAL: 461 MS
RBC # BLD AUTO: 3.85 MILLION/UL (ref 3.88–5.62)
SODIUM SERPL-SCNC: 140 MMOL/L (ref 136–145)
T WAVE AXIS: 41 DEGREES
VENTRICULAR RATE: 86 BPM
WBC # BLD AUTO: 8.9 THOUSAND/UL (ref 4.31–10.16)

## 2019-12-06 PROCEDURE — 99232 SBSQ HOSP IP/OBS MODERATE 35: CPT | Performed by: PHYSICIAN ASSISTANT

## 2019-12-06 PROCEDURE — 99232 SBSQ HOSP IP/OBS MODERATE 35: CPT | Performed by: INTERNAL MEDICINE

## 2019-12-06 PROCEDURE — 85025 COMPLETE CBC W/AUTO DIFF WBC: CPT | Performed by: PHYSICIAN ASSISTANT

## 2019-12-06 PROCEDURE — 84145 PROCALCITONIN (PCT): CPT | Performed by: PHYSICIAN ASSISTANT

## 2019-12-06 PROCEDURE — 80048 BASIC METABOLIC PNL TOTAL CA: CPT | Performed by: PHYSICIAN ASSISTANT

## 2019-12-06 PROCEDURE — 85730 THROMBOPLASTIN TIME PARTIAL: CPT | Performed by: INTERNAL MEDICINE

## 2019-12-06 PROCEDURE — 82948 REAGENT STRIP/BLOOD GLUCOSE: CPT

## 2019-12-06 PROCEDURE — 85610 PROTHROMBIN TIME: CPT | Performed by: INTERNAL MEDICINE

## 2019-12-06 PROCEDURE — 93010 ELECTROCARDIOGRAM REPORT: CPT | Performed by: INTERNAL MEDICINE

## 2019-12-06 RX ORDER — CEPHALEXIN 500 MG/1
500 CAPSULE ORAL EVERY 6 HOURS SCHEDULED
Status: DISCONTINUED | OUTPATIENT
Start: 2019-12-06 | End: 2019-12-11 | Stop reason: HOSPADM

## 2019-12-06 RX ORDER — INSULIN GLARGINE 100 [IU]/ML
18 INJECTION, SOLUTION SUBCUTANEOUS
Status: DISCONTINUED | OUTPATIENT
Start: 2019-12-06 | End: 2019-12-06

## 2019-12-06 RX ORDER — TORSEMIDE 20 MG/1
10 TABLET ORAL
Status: DISCONTINUED | OUTPATIENT
Start: 2019-12-06 | End: 2019-12-07

## 2019-12-06 RX ORDER — INSULIN GLARGINE 100 [IU]/ML
15 INJECTION, SOLUTION SUBCUTANEOUS
Status: DISCONTINUED | OUTPATIENT
Start: 2019-12-06 | End: 2019-12-11 | Stop reason: HOSPADM

## 2019-12-06 RX ADMIN — INSULIN LISPRO 1 UNITS: 100 INJECTION, SOLUTION INTRAVENOUS; SUBCUTANEOUS at 21:55

## 2019-12-06 RX ADMIN — ATORVASTATIN CALCIUM 20 MG: 20 TABLET, FILM COATED ORAL at 17:22

## 2019-12-06 RX ADMIN — METOPROLOL TARTRATE 25 MG: 25 TABLET, FILM COATED ORAL at 21:55

## 2019-12-06 RX ADMIN — INSULIN GLARGINE 15 UNITS: 100 INJECTION, SOLUTION SUBCUTANEOUS at 21:55

## 2019-12-06 RX ADMIN — ACYCLOVIR 400 MG: 200 CAPSULE ORAL at 09:22

## 2019-12-06 RX ADMIN — Medication 1 APPLICATION: at 09:23

## 2019-12-06 RX ADMIN — INSULIN LISPRO 2 UNITS: 100 INJECTION, SOLUTION INTRAVENOUS; SUBCUTANEOUS at 12:36

## 2019-12-06 RX ADMIN — FLUTICASONE FUROATE AND VILANTEROL TRIFENATATE 1 PUFF: 200; 25 POWDER RESPIRATORY (INHALATION) at 09:23

## 2019-12-06 RX ADMIN — CEPHALEXIN 500 MG: 500 CAPSULE ORAL at 12:36

## 2019-12-06 RX ADMIN — METOPROLOL TARTRATE 25 MG: 25 TABLET, FILM COATED ORAL at 09:21

## 2019-12-06 RX ADMIN — CLOTRIMAZOLE 1 APPLICATION: 10 CREAM TOPICAL at 09:23

## 2019-12-06 RX ADMIN — CEFAZOLIN SODIUM 2000 MG: 2 SOLUTION INTRAVENOUS at 06:00

## 2019-12-06 RX ADMIN — INSULIN LISPRO 1 UNITS: 100 INJECTION, SOLUTION INTRAVENOUS; SUBCUTANEOUS at 09:29

## 2019-12-06 RX ADMIN — INSULIN LISPRO 5 UNITS: 100 INJECTION, SOLUTION INTRAVENOUS; SUBCUTANEOUS at 17:22

## 2019-12-06 RX ADMIN — TORSEMIDE 10 MG: 20 TABLET ORAL at 17:23

## 2019-12-06 RX ADMIN — CEPHALEXIN 500 MG: 500 CAPSULE ORAL at 17:23

## 2019-12-06 RX ADMIN — ACETAMINOPHEN 650 MG: 325 TABLET ORAL at 12:51

## 2019-12-06 RX ADMIN — INSULIN LISPRO 5 UNITS: 100 INJECTION, SOLUTION INTRAVENOUS; SUBCUTANEOUS at 14:37

## 2019-12-06 RX ADMIN — WARFARIN SODIUM 7.5 MG: 7.5 TABLET ORAL at 17:23

## 2019-12-06 NOTE — ASSESSMENT & PLAN NOTE
· Hx of CKD stage 2-3 per last nephro note  · Concern of DORA - Cr 1 5 on admission  · Baseline appears to be 1 2-1 4  · Continue to monitor  · Restart  Torsemide

## 2019-12-06 NOTE — SOCIAL WORK
As per provider note, Pending PT/OT input and medical stability: monitor BP, and glucose  d/c pending, CM following

## 2019-12-06 NOTE — ASSESSMENT & PLAN NOTE
Wt Readings from Last 3 Encounters:   12/06/19 (!) 162 kg (358 lb 0 4 oz)   11/05/19 (!) 159 kg (349 lb 14 4 oz)   10/20/19 (!) 159 kg (350 lb 8 5 oz)       · Hx of diastolic HF  · Echo 9/8/41 - EF 60% w/ grade 3 diastolic dysfunction   · Daily weights, I/Os  · Continue lopressor BID   · Torsemide restarted    · So signs of fluid overload today - monitor w/ new SOB

## 2019-12-06 NOTE — SOCIAL WORK
CM met with pt at bedside to discuss CM role in dcp  Pt reports that he lives at [facility], assisted living  Pt lives alone at the facility  Pt reports that he has assist with adl's, uses walker to stand and wc at baseline  Pt reported that he is unable to ambulate with just the walker  Pt reports that he has had IP STR at [facility], pt reports [address] 80, doesn't remember the agency  Pt denies IPMH, MH, drug/etoh  PCP confirmed and pharmacy; [address]  CM reviewed d/c planning process including the following: identifying help at home, patient preference for d/c planning needs, Discharge Lounge, [facility] Meds to Bed program, availability of treatment team to discuss questions or concerns patient and/or family may have regarding understanding medications and recognizing signs and symptoms once discharged  CM also encouraged patient to follow up with all recommended appointments after discharge  Patient advised of importance for patient and family to participate in managing patients medical well being

## 2019-12-06 NOTE — PROGRESS NOTES
Pt resting in bed, denies any discomfort at this time, call bell within reach, bed alarm on, will continue to monitor

## 2019-12-06 NOTE — ASSESSMENT & PLAN NOTE
· Pt not in remission  · Received chemotherapy - Velcade and decadron cycle 1/6   · Prophylactic acyclovir  · Also received venofer for associated anemia

## 2019-12-06 NOTE — PROGRESS NOTES
Progress Note - Infectious Disease   Issa Champion 81 y o  male MRN: [id number]  Unit/Bed#: -01 Encounter: [id number]      Impression/Recommendations:  1  Sepsis  POA:  Fever, leukocytosis  Due to # 2  Consider UTI given pyuria although denies symptoms  Blood cultures, CXR negative  Serial procalcitonin negative for sepsis threshold  Clinically stable and nontoxic  Improved with antibiotics  Rec:  ? Continue antibiotics as below  ? Follow temperatures closely  ? Supportive care as per the primary service     2  Right leg non-purulent cellulitis  In setting of #3  Consider streptococcal infection given well-demarcated erythema  Markedly improved with IV antibiotics  Rec:  ? Change antibiotics to Keflex to continue through 12/11 to complete 7 days total of antibiotics  ? Edema control as below     3  Chronic lymphedema  With xerosis, venous stasis dermatitis  Risk factor for above  Rec:  ? Continue Lac-Hydrin lotion  ? Leg elevation while in house  ? Needs better outpatient edema control     4  Asymptomatic bacteriuria  Likely represents colonization versus contamination  In absence of symptoms does not represent active infection  Rec:  · No additional antibiotics indicated    5  Mild DORA on CKD  Baseline Cr 1 2-1 4  Likely due to dehydration in setting of recent diarrhea, decreased PO intake  Improved      6   DM with DPN  A1c 6 1  Risk factor for infection      7  PAD  Status post left AKA     8  MO      9   MM  On Velcade, Decadron  Missed recent cycle    The patient is stable from an ID standpoint for D/C at any time      Antibiotics:  Cefazolin #2    Subjective:  Patient seen on AM rounds  Feels well  Denies fevers, chills, sweats, nausea, vomiting, or diarrhea  24 Hour Events:  No documented fevers, chills, sweats, nausea, vomiting, or diarrhea      Objective:  Vitals:  Temp:  [98 1 °F (36 7 °C)-99 6 °F (37 6 °C)] 99 6 °F (37 6 °C)  HR:  [74-84] 84  Resp:  [16] 16  BP: (167-172)/(75-79) 169/75  SpO2:  [93 %-96 %] 93 %  Temp (24hrs), Av 9 °F (37 2 °C), Min:98 1 °F (36 7 °C), Max:99 6 °F (37 6 °C)  Current: Temperature: 99 6 °F (37 6 °C)    Physical Exam:   General:  No acute distress  Psychiatric:  Awake and alert  Pulmonary:  Normal respiratory excursion without accessory muscle use  Abdomen:  Soft, nontender  Extremities:  Nonpitting edema right leg, markedly improved erythema  Skin:  No rashes    Lab Results:  I have personally reviewed pertinent labs  Results from last 7 days   Lab Units 19  0455 19  0505 19  2333 19  1202   POTASSIUM mmol/L 3 9 4 2 4 6 4 3   CHLORIDE mmol/L 108 107 105 110*   CO2 mmol/L 24 24 27 28   BUN mg/dL 33* 32* 33* 29*   CREATININE mg/dL 1 17 1 41* 1 56* 1 10   EGFR ml/min/1 73sq m 64 51 45 69   CALCIUM mg/dL 8 5 8 0* 8 8 9 7   AST U/L  --  36 30 15   ALT U/L  --  23 25 17   ALK PHOS U/L  --  223* 259* 200*     Results from last 7 days   Lab Units 195 19  0505 19  2333   WBC Thousand/uL 8 90 13 65* 14 27*   HEMOGLOBIN g/dL 10 9* 10 5* 11 4*   PLATELETS Thousands/uL 210 208 227     Results from last 7 days   Lab Units 19  2333   BLOOD CULTURE  No Growth at 24 hrs  No Growth at 24 hrs  Imaging Studies:   I have personally reviewed pertinent imaging study reports and images in PACS  EKG, Pathology, and Other Studies:   I have personally reviewed pertinent reports

## 2019-12-06 NOTE — PROGRESS NOTES
[facility]     Progress Note - Elizabeth Pea 1950, 81 y o  male MRN: [id number]    Unit/Bed#: -01 Encounter: [id number]    Primary Care Provider: Zita Moya MD   Date and time admitted to hospital: 12/4/2019 10:34 PM    * Sepsis due to cellulitis [facility])  Assessment & Plan  · Pt w/ hx of DM, obesity, PVD, AKA L side, and multiple myeloma currently receiving chemo admitted 12/5 for fevers, weakness, and worsening redness of R lower leg on chronic skin changes  Febrile and WBC on admission  · Off of IV abx - now cephalexin until 12/11 per ID   · Infectious disease consult  · Blood culture 24 hr no growth  Urine culture pending  · No sign of osteo on XR  · Lactic acid improving  No leukocytosis  · PCT   33 on admission and this AM  · Continue to monitor -improving in appearance (new pic in media)  Lymphedema  Assessment & Plan  · Improving w/ elevation      Chronic diastolic (congestive) heart failure (HCC)  Assessment & Plan  Wt Readings from Last 3 Encounters:   12/06/19 (!) 162 kg (358 lb 0 4 oz)   11/05/19 (!) 159 kg (349 lb 14 4 oz)   10/20/19 (!) 159 kg (350 lb 8 5 oz)       · Hx of diastolic HF  · Echo 4/9/70 - EF 60% w/ grade 3 diastolic dysfunction   · Daily weights, I/Os  · Continue lopressor BID   · Torsemide restarted    · So signs of fluid overload today - monitor w/ new SOB      CKD (chronic kidney disease)  Assessment & Plan  · Hx of CKD stage 2-3 per last nephro note  · Concern of DORA - Cr 1 5 on admission  · Baseline appears to be 1 2-1 4  · Continue to monitor  · Restart  Torsemide    Multiple myeloma (HCC)  Assessment & Plan  · Pt not in remission  · Received chemotherapy - Velcade and decadron cycle 1/6   · Prophylactic acyclovir  · Also received venofer for associated anemia     Hyperlipidemia  Assessment & Plan  · Continue Lipitor      Chronic atrial fibrillation  Assessment & Plan  · On warfarin 10 mg daily typically  · Monitor INR    · Continue metoprolol  Essential hypertension  Assessment & Plan  · Pt hypertensive today - can restart Demadex w/ improved Cr  · Continue metoprolol  · Monitor BP     Diabetes mellitus type 2, uncontrolled [facility])  Assessment & Plan  Lab Results   Component Value Date    HGBA1C 6 1 12/05/2019       Recent Labs     12/05/19  0308 12/05/19  0707 12/05/19  1105 12/05/19  1617   POCGLU 96 106 168* 151*       Blood Sugar Average: Last 72 hrs:  (P) 130 25     · W/ neuropathy   · On glargine 18 units at night and 30 units with meals at home  · A1C 6 1 - improved from 12/7/18 - 9 7  · Pt having low morning sugars  Changed glargine to 10 units at night and ISS  · Switched back to 15 units + 5 units AC + ISS HS  · Metformin was on hold while inpt  · Continue to monitor       VTE Pharmacologic Prophylaxis:   Pharmacologic: Warfarin (Coumadin)  Mechanical VTE Prophylaxis in Place: No    Patient Centered Rounds: I have performed bedside rounds with nursing staff today  Discussions with Specialists or Other Care Team Provider: Viewed ID's notes     Education and Discussions with Family / Patient: Discussed w/ pt the plan     Time Spent for Care: 30 minutes  More than 50% of total time spent on counseling and coordination of care as described above  Current Length of Stay: 1 day(s)    Current Patient Status: Inpatient   Certification Statement: The patient will continue to require additional inpatient hospital stay due to continued monitoring and pt/ot input    Discharge Plan: Pending PT/OT input and medical stability: monitor BP, and glucose  Code Status: Level 1 - Full Code      Subjective:   Pt states he is feeling terrible today  Has a HA and feels overall weak and fatigued  Notes he has not been able to stand up even to have a BM  Has been having to use bedpan because he feels to weak  States he still has been having chills  No n/v, CP  Some SOB w/ lying down  No cough noted   Does not have sensation in LE, states "I heard it looks better"  No other complaints  Objective:     Vitals:   Temp (24hrs), Av 6 °F (37 6 °C), Min:99 6 °F (37 6 °C), Max:99 6 °F (37 6 °C)    Temp:  [99 6 °F (37 6 °C)] 99 6 °F (37 6 °C)  HR:  [80-84] 84  BP: (167-169)/(75) 169/75  SpO2:  [93 %] 93 %  Body mass index is 44 75 kg/m²  Input and Output Summary (last 24 hours): Intake/Output Summary (Last 24 hours) at 2019 1515  Last data filed at 2019 1300  Gross per 24 hour   Intake 580 ml   Output 1400 ml   Net -820 ml       Physical Exam:     Physical Exam   Constitutional: He is oriented to person, place, and time  He appears well-developed and well-nourished  No distress  Obese male laying in bed      Neck: Neck supple  No JVD present  Cardiovascular:   No murmur heard  Irregularly irregular    Pulmonary/Chest: Effort normal  No respiratory distress  He has no wheezes  He has no rales  NC on w/ sats between 89-91   Abdominal: Soft  Bowel sounds are normal    Musculoskeletal: He exhibits no edema  Neurological: He is alert and oriented to person, place, and time  Skin: Skin is warm and dry  Capillary refill takes less than 2 seconds  Improved R LE erythema  Chronic skin changes present  Psychiatric: He has a normal mood and affect  His behavior is normal    Nursing note and vitals reviewed          Additional Data:     Labs:    Results from last 7 days   Lab Units 19  0455   WBC Thousand/uL 8 90   HEMOGLOBIN g/dL 10 9*   HEMATOCRIT % 36 1*   PLATELETS Thousands/uL 210   NEUTROS PCT % 79*   LYMPHS PCT % 10*   MONOS PCT % 8   EOS PCT % 1     Results from last 7 days   Lab Units 19  0455 19  0505   SODIUM mmol/L 140 138   POTASSIUM mmol/L 3 9 4 2   CHLORIDE mmol/L 108 107   CO2 mmol/L 24 24   BUN mg/dL 33* 32*   CREATININE mg/dL 1 17 1 41*   ANION GAP mmol/L 8 7   CALCIUM mg/dL 8 5 8 0*   ALBUMIN g/dL  --  2 7*   TOTAL BILIRUBIN mg/dL  --  1 26*   ALK PHOS U/L  --  223*   ALT U/L  --  23   AST U/L --  36   GLUCOSE RANDOM mg/dL 136 93     Results from last 7 days   Lab Units 12/06/19  0023   INR  2 14*     Results from last 7 days   Lab Units 12/06/19  1043 12/06/19  0621 12/05/19  2118 12/05/19  1617 12/05/19  1105 12/05/19  0707 12/05/19  0308   POC GLUCOSE mg/dl 198* 160* 169* 151* 168* 106 96     Results from last 7 days   Lab Units 12/05/19  0505   HEMOGLOBIN A1C % 6 1     Results from last 7 days   Lab Units 12/06/19  0455 12/05/19  0130 12/04/19  2333   LACTIC ACID mmol/L  --  1 3 2 2*   PROCALCITONIN ng/ml 0 33*  --  0 33*           * I Have Reviewed All Lab Data Listed Above  * Additional Pertinent Lab Tests Reviewed: All Labs For Current Hospital Admission Reviewed    Imaging:    Imaging Reports Reviewed Today Include: All for admission   Imaging Personally Reviewed by Myself Includes:  None     Recent Cultures (last 7 days):     Results from last 7 days   Lab Units 12/04/19  2333   BLOOD CULTURE  No Growth at 24 hrs  No Growth at 24 hrs         Last 24 Hours Medication List:     Current Facility-Administered Medications:  acetaminophen 650 mg Oral Q6H PRN Myron Krishna MD   acyclovir 400 mg Oral Daily Estella Cedeño MD   albuterol 2 puff Inhalation Q6H PRN Myron Krishna MD   aluminum-magnesium hydroxide-simethicone 15 mL Oral Q6H PRN Myron Krishna MD   ammonium lactate  Topical Daily Myron Krishna MD   atorvastatin 20 mg Oral Daily With Ala Lipoma, MD   bisacodyl 5 mg Oral Daily PRN Myron Krishna MD   bisacodyl 10 mg Rectal Daily PRN Myron Krishna MD   cephalexin 500 mg Oral Q6H [address] Britni Bryan MD   clotrimazole  Topical BID Myron Krishna MD   fluticasone-vilanterol 1 puff Inhalation Daily Myron Krishna MD   insulin glargine 15 Units Subcutaneous HS Ilene Sibley PA-C   insulin lispro 1-5 Units Subcutaneous HS Myron Krishna MD   insulin lispro 5 Units Subcutaneous TID With Meals Ilene Sibley PA-C   metoprolol tartrate 25 mg Oral Q12H [address] [place] [address] Karina Mullins MD   ondansetron 4 mg Intravenous Q6H PRN Jessica Valencia MD   sodium chloride (PF) 3 mL Intravenous PRN Jessica Valencia MD   torsemide 10 mg Oral BID (diuretic) Ilene Sibley PA-C   warfarin 7 5 mg Oral Daily (warfarin) Jessica Valencia MD        Today, Patient Was Seen By: Liam Hallman PA-C    ** Please Note: Dictation voice to text software may have been used in the creation of this document   **

## 2019-12-06 NOTE — ASSESSMENT & PLAN NOTE
· Pt w/ hx of DM, obesity, PVD, AKA L side, and multiple myeloma currently receiving chemo admitted 12/5 for fevers, weakness, and worsening redness of R lower leg on chronic skin changes  Febrile and WBC on admission  · Off of IV abx - now cephalexin until 12/11 per ID   · Infectious disease consult  · Blood culture 24 hr no growth  Urine culture pending  · No sign of osteo on XR  · Lactic acid improving  No leukocytosis  · PCT   33 on admission and this AM  · Continue to monitor -improving in appearance (new pic in media)

## 2019-12-07 LAB
ANION GAP SERPL CALCULATED.3IONS-SCNC: 6 MMOL/L (ref 4–13)
BACTERIA UR CULT: ABNORMAL
BUN SERPL-MCNC: 32 MG/DL (ref 5–25)
CALCIUM SERPL-MCNC: 8.8 MG/DL (ref 8.3–10.1)
CHLORIDE SERPL-SCNC: 107 MMOL/L (ref 100–108)
CO2 SERPL-SCNC: 24 MMOL/L (ref 21–32)
CREAT SERPL-MCNC: 1.28 MG/DL (ref 0.6–1.3)
GFR SERPL CREATININE-BSD FRML MDRD: 58 ML/MIN/1.73SQ M
GLUCOSE SERPL-MCNC: 139 MG/DL (ref 65–140)
GLUCOSE SERPL-MCNC: 158 MG/DL (ref 65–140)
GLUCOSE SERPL-MCNC: 168 MG/DL (ref 65–140)
GLUCOSE SERPL-MCNC: 182 MG/DL (ref 65–140)
GLUCOSE SERPL-MCNC: 193 MG/DL (ref 65–140)
INR PPP: 2.05 (ref 0.84–1.19)
POTASSIUM SERPL-SCNC: 3.8 MMOL/L (ref 3.5–5.3)
PROTHROMBIN TIME: 22.6 SECONDS (ref 11.6–14.5)
SODIUM SERPL-SCNC: 137 MMOL/L (ref 136–145)

## 2019-12-07 PROCEDURE — 82948 REAGENT STRIP/BLOOD GLUCOSE: CPT

## 2019-12-07 PROCEDURE — 80048 BASIC METABOLIC PNL TOTAL CA: CPT | Performed by: PHYSICIAN ASSISTANT

## 2019-12-07 PROCEDURE — 99232 SBSQ HOSP IP/OBS MODERATE 35: CPT | Performed by: INTERNAL MEDICINE

## 2019-12-07 PROCEDURE — 85610 PROTHROMBIN TIME: CPT | Performed by: PHYSICIAN ASSISTANT

## 2019-12-07 PROCEDURE — 99232 SBSQ HOSP IP/OBS MODERATE 35: CPT | Performed by: PHYSICIAN ASSISTANT

## 2019-12-07 RX ORDER — TORSEMIDE 20 MG/1
20 TABLET ORAL
Status: DISCONTINUED | OUTPATIENT
Start: 2019-12-07 | End: 2019-12-11 | Stop reason: HOSPADM

## 2019-12-07 RX ADMIN — ATORVASTATIN CALCIUM 20 MG: 20 TABLET, FILM COATED ORAL at 17:13

## 2019-12-07 RX ADMIN — FLUTICASONE FUROATE AND VILANTEROL TRIFENATATE 1 PUFF: 200; 25 POWDER RESPIRATORY (INHALATION) at 08:35

## 2019-12-07 RX ADMIN — CEPHALEXIN 500 MG: 500 CAPSULE ORAL at 17:13

## 2019-12-07 RX ADMIN — METOPROLOL TARTRATE 25 MG: 25 TABLET, FILM COATED ORAL at 22:51

## 2019-12-07 RX ADMIN — METOPROLOL TARTRATE 25 MG: 25 TABLET, FILM COATED ORAL at 08:41

## 2019-12-07 RX ADMIN — CEPHALEXIN 500 MG: 500 CAPSULE ORAL at 00:22

## 2019-12-07 RX ADMIN — CEPHALEXIN 500 MG: 500 CAPSULE ORAL at 12:20

## 2019-12-07 RX ADMIN — INSULIN LISPRO 5 UNITS: 100 INJECTION, SOLUTION INTRAVENOUS; SUBCUTANEOUS at 08:36

## 2019-12-07 RX ADMIN — INSULIN GLARGINE 15 UNITS: 100 INJECTION, SOLUTION SUBCUTANEOUS at 22:55

## 2019-12-07 RX ADMIN — CLOTRIMAZOLE: 10 CREAM TOPICAL at 17:12

## 2019-12-07 RX ADMIN — INSULIN LISPRO 1 UNITS: 100 INJECTION, SOLUTION INTRAVENOUS; SUBCUTANEOUS at 22:52

## 2019-12-07 RX ADMIN — TORSEMIDE 20 MG: 20 TABLET ORAL at 17:13

## 2019-12-07 RX ADMIN — INSULIN LISPRO 5 UNITS: 100 INJECTION, SOLUTION INTRAVENOUS; SUBCUTANEOUS at 12:19

## 2019-12-07 RX ADMIN — WARFARIN SODIUM 7.5 MG: 7.5 TABLET ORAL at 17:13

## 2019-12-07 RX ADMIN — ACYCLOVIR 400 MG: 200 CAPSULE ORAL at 08:38

## 2019-12-07 RX ADMIN — CLOTRIMAZOLE 1 APPLICATION: 10 CREAM TOPICAL at 08:39

## 2019-12-07 RX ADMIN — TORSEMIDE 10 MG: 20 TABLET ORAL at 08:42

## 2019-12-07 RX ADMIN — CEPHALEXIN 500 MG: 500 CAPSULE ORAL at 06:24

## 2019-12-07 RX ADMIN — INSULIN LISPRO 5 UNITS: 100 INJECTION, SOLUTION INTRAVENOUS; SUBCUTANEOUS at 17:13

## 2019-12-07 RX ADMIN — Medication 1 APPLICATION: at 08:39

## 2019-12-07 NOTE — ASSESSMENT & PLAN NOTE
· Pt w/ hx of DM, obesity, PVD, AKA L side, and multiple myeloma currently receiving chemo admitted 12/5 for fevers, weakness, and worsening redness of R lower leg on chronic skin changes  Febrile and WBC on admission     · Off of IV abx - now cephalexin until 12/11 per ID   · Infectious disease consult appreciated   · 1 out of 2 Bl cx positive for GPC, suspect contaminant given late growth   · No sign of osteo on XR  · Encourage LE elevation, compression, aggressive edema control   · Continue to monitor -improving in appearance

## 2019-12-07 NOTE — ASSESSMENT & PLAN NOTE
· Hx of CKD stage 2-3 per last nephro note  · Concern of DORA - Cr 1 5 on admission  · Baseline appears to be 1 2-1 4  · Continue Torsemide 10mg BID

## 2019-12-07 NOTE — PROGRESS NOTES
Progress Note - Infectious Disease   Kalani Hair 81 y o  male MRN: [id number]  Unit/Bed#: -01 Encounter: [id number]      Impression/Recommendations:  1   Sepsis   POA:  Fever, leukocytosis   Due to # 2   Consider UTI given pyuria although denies symptoms   Blood cultures, CXR negative   Serial procalcitonin negative for sepsis threshold   Clinically stable and nontoxic   Improved with antibiotics  Low grade temps may be due to atelectasis versus resolving infection  Rec:  ? Continue antibiotics as below  ? Follow temperatures closely  ? Supportive care as per the primary service     2   Right leg non-purulent cellulitis   In setting of #3   Consider streptococcal infection given well-demarcated erythema  Markedly improved with IV antibiotics  Rec:  ? Continue Keflex through 12/11 to complete 7 days total of antibiotics  ? Edema control as below     3   Chronic lymphedema   With xerosis, venous stasis dermatitis   Risk factor for above  Rec:  ? Continue Lac-Hydrin lotion  ? Leg elevation while in house  ? Needs better outpatient edema control     4  GPC bacteremia  Single set with late growth likely represents contaminant  Clinically improving with beta-lactam   Rec:  · No additional antibiotics for now  · Follow-up blood culture ID/susceptibilities which will determine if any further workup or treatment indicated  5   Asymptomatic bacteriuria  Likely represents colonization versus contamination  In absence of symptoms does not represent active infection  Rec:  ? No additional antibiotics indicated     6  Mild DORA on CKD   Baseline Cr 1 2-1 4   Likely due to dehydration in setting of recent diarrhea, decreased PO intake  Improved      7   DM with DPN    A1c 6 1   Risk factor for infection      8   PAD   Status post left AKA     9   MM  On Velcade, Decadron   Missed recent cycle     The patient is stable from an ID standpoint      Antibiotics:  Keflex #2  Antibiotics #3    Subjective:  Patient seen on AM rounds  Felt "hot" overnight but that happens at home  Pulled off the covers and felt better  Think leg continues to improve but right groin "sore"  Denies nausea, vomiting, or diarrhea  24 Hour Events:  Low-grade temp to 100 8 overnight  No documented fevers, chills, sweats, nausea, vomiting, or diarrhea  Single blood culture now positive GPC in clusters  Objective:  Vitals:  Temp:  [97 7 °F (36 5 °C)-100 8 °F (38 2 °C)] 97 7 °F (36 5 °C)  HR:  [68-93] 87  Resp:  [18-19] 19  BP: (134-148)/(62-87) 148/87  SpO2:  [83 %-94 %] 85 %  Temp (24hrs), Av 2 °F (37 3 °C), Min:97 7 °F (36 5 °C), Max:100 8 °F (38 2 °C)  Current: Temperature: 97 7 °F (36 5 °C)    Physical Exam:   General:  No acute distress  Psychiatric:  Awake and alert  Pulmonary:  Normal respiratory excursion without accessory muscle use  Abdomen:  Soft, nontender  Extremities:  Stable brawny chronic venous stasis changes with improved cellulitis  No visible or palpable abnormality right groin  Skin:  No rashes    Lab Results:  I have personally reviewed pertinent labs  Results from last 7 days   Lab Units 19  05019  2333 19  1202   POTASSIUM mmol/L 3 9 4 2 4 6 4 3   CHLORIDE mmol/L 108 107 105 110*   CO2 mmol/L 24 24 27 28   BUN mg/dL 33* 32* 33* 29*   CREATININE mg/dL 1 17 1 41* 1 56* 1 10   EGFR ml/min/1 73sq m 64 51 45 69   CALCIUM mg/dL 8 5 8 0* 8 8 9 7   AST U/L  --  36 30 15   ALT U/L  --  23 25 17   ALK PHOS U/L  --  223* 259* 200*     Results from last 7 days   Lab Units 195 19  0505 19  2333   WBC Thousand/uL 8 90 13 65* 14 27*   HEMOGLOBIN g/dL 10 9* 10 5* 11 4*   PLATELETS Thousands/uL 210 208 227     Results from last 7 days   Lab Units 19  1941   BLOOD CULTURE  No Growth at 48 hrs     GRAM STAIN RESULT  Gram positive cocci in clusters*       Imaging Studies:   I have personally reviewed pertinent imaging study reports and images in PACS  EKG, Pathology, and Other Studies:   I have personally reviewed pertinent reports

## 2019-12-07 NOTE — ASSESSMENT & PLAN NOTE
Wt Readings from Last 3 Encounters:   12/06/19 (!) 162 kg (358 lb 0 4 oz)   11/05/19 (!) 159 kg (349 lb 14 4 oz)   10/20/19 (!) 159 kg (350 lb 8 5 oz)     · Hx of diastolic HF  · Echo 1/0/78 - EF 60% w/ grade 3 diastolic dysfunction   · Daily weights, I/Os  · Continue lopressor BID   · Torsemide restarted, will increase dose to 20mg BID

## 2019-12-07 NOTE — ASSESSMENT & PLAN NOTE
Lab Results   Component Value Date    HGBA1C 6 1 12/05/2019     Recent Labs     12/06/19  1607 12/06/19  2113 12/07/19  0656 12/07/19  1043   POCGLU 164* 166* 168* 182*     Blood Sugar Average: Last 72 hrs:  (P) 157 [id number]   · Suspect patient on too much insulin at home - states nursing home staff gives him snacks before they give him his insulin  · Home regimen was glargine 18 units at night and 30 units with meals at home  · A1C 6 1 - improved from 9 7 on 12/7/18  · Decreased insulin regimen to 15 units + 5 units AC + ISS HS  · Metformin was on hold while inpt    · Continue to monitor

## 2019-12-07 NOTE — PLAN OF CARE
Problem: SAFETY ADULT  Goal: Patient will remain free of falls  Description  INTERVENTIONS:  - Assess patient frequently for physical needs  -  Identify cognitive and physical deficits and behaviors that affect risk of falls    -  [place] fall precautions as indicated by assessment   - Educate patient/family on patient safety including physical limitations  - Instruct patient to call for assistance with activity based on assessment  - Modify environment to reduce risk of injury  - Consider OT/PT consult to assist with strengthening/mobility  Outcome: Progressing

## 2019-12-07 NOTE — PROGRESS NOTES
Progress Note - Boogie Wong 1950, 81 y o  male MRN: [id number]  Unit/Bed#: MS [facility]-01 Encounter: [id number] DOS: 12/7/19  Primary Care Provider: Deysi Watts MD   Date and time admitted to hospital: 12/4/2019 10:34 PM    * Sepsis due to cellulitis [facility])  Assessment & Plan  · Pt w/ hx of DM, obesity, PVD, AKA L side, and multiple myeloma currently receiving chemo admitted 12/5 for fevers, weakness, and worsening redness of R lower leg on chronic skin changes  Febrile and WBC on admission  · Off of IV abx - now cephalexin until 12/11 per ID   · Infectious disease consult appreciated   · 1 out of 2 Bl cx positive for GPC, suspect contaminant given late growth   · No sign of osteo on XR  · Encourage LE elevation, compression, aggressive edema control   · Continue to monitor -improving in appearance    Chronic diastolic (congestive) heart failure (HCC)  Assessment & Plan  Wt Readings from Last 3 Encounters:   12/06/19 (!) 162 kg (358 lb 0 4 oz)   11/05/19 (!) 159 kg (349 lb 14 4 oz)   10/20/19 (!) 159 kg (350 lb 8 5 oz)     · Hx of diastolic HF  · Echo 2/2/05 - EF 60% w/ grade 3 diastolic dysfunction   · Daily weights, I/Os  · Continue lopressor BID   · Torsemide restarted, will increase dose to 20mg BID      Chronic atrial fibrillation  Assessment & Plan  · On warfarin 10 mg daily typically  · Monitor INR    · Continue metoprolol  Diabetes mellitus type 2, uncontrolled [facility])  Assessment & Plan  Lab Results   Component Value Date    HGBA1C 6 1 12/05/2019     Recent Labs     12/06/19  1607 12/06/19  2113 12/07/19  0656 12/07/19  1043   POCGLU 164* 166* 168* 182*     Blood Sugar Average: Last 72 hrs:  (P) 157 [id number]   · Suspect patient on too much insulin at home - states nursing home staff gives him snacks before they give him his insulin     · Home regimen was glargine 18 units at night and 30 units with meals at home  · A1C 6 1 - improved from 9 7 on 12/7/18  · Decreased insulin regimen to 15 units + 5 units AC + ISS HS  · Metformin was on hold while inpt  · Continue to monitor     CKD (chronic kidney disease)  Assessment & Plan  · Hx of CKD stage 2-3 per last nephro note  · Concern of DORA - Cr 1 5 on admission  · Baseline appears to be 1 2-1 4  · Continue Torsemide 10mg BID    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)  Assessment & Plan  · Known ambulatory dysfunction  · Awaits PT OT     Multiple myeloma ([address] )  Assessment & Plan  · Pt not in remission   · Received chemotherapy - Velcade and decadron cycle    · Prophylactic acyclovir  · Also received venofer for associated anemia     NALLELY (obstructive sleep apnea)  Assessment & Plan  · Patient becomes hypoxic during sleep  · Hx of NALLELY but not on CPAP because he was unable to afford co-pay   · Follows Dr Lazarus Erie, last seen in 2019 and sleep study completed in    · Continue oxygen PRN and qhs while sleeping     Hyperlipidemia  Assessment & Plan  · Continue Lipitor    VTE Pharmacologic Prophylaxis:   Pharmacologic: Warfarin (Coumadin)  Mechanical VTE Prophylaxis in Place: Yes    Patient Centered Rounds: I have performed bedside rounds with nursing staff today  Discussions with Specialists or Other Care Team Provider: nursing     Education and Discussions with Family / Patient: patient, declined call to family     Time Spent for Care: 30 minutes  More than 50% of total time spent on counseling and coordination of care as described above  Current Length of Stay: 2 day(s)    Current Patient Status: Inpatient   Certification Statement: The patient will continue to require additional inpatient hospital stay due to pending safe d/c planning     Discharge Plan: may need rehab, pending course     Code Status: Level 1 - Full Code    Subjective:   Pt seen and examined at bedside       Objective:     Vitals:   Temp (24hrs), Av 2 °F (37 3 °C), Min:97 7 °F (36 5 °C), Max:100 8 °F (38 2 °C)    Temp:  [97 7 °F (36 5 °C)-100 8 °F (38 2 °C)] 97 7 °F (36 5 °C)  HR:  [68-93] 87  Resp:  [18-19] 19  BP: (134-148)/(62-87) 148/87  SpO2:  [83 %-96 %] 96 %  Body mass index is 44 75 kg/m²  Input and Output Summary (last 24 hours): Intake/Output Summary (Last 24 hours) at 12/7/2019 1154  Last data filed at 12/7/2019 0800  Gross per 24 hour   Intake 480 ml   Output 375 ml   Net 105 ml     Physical Exam:     Physical Exam   Constitutional: He is oriented to person, place, and time  He appears well-developed and well-nourished  HENT:   Head: Normocephalic and atraumatic  Eyes: EOM are normal  No scleral icterus  Neck: Normal range of motion  Neck supple  Cardiovascular: Normal rate and normal heart sounds  An irregularly irregular rhythm present  No murmur heard  Pulmonary/Chest: Effort normal and breath sounds normal    Dec at bases    Abdominal:   Morbidly obese    Musculoskeletal: He exhibits edema and deformity (left AKA)  Neurological: He is alert and oriented to person, place, and time  Skin: Skin is warm  There is erythema  Nursing note and vitals reviewed  Additional Data:     Labs:    Results from last 7 days   Lab Units 12/06/19  0455   WBC Thousand/uL 8 90   HEMOGLOBIN g/dL 10 9*   HEMATOCRIT % 36 1*   PLATELETS Thousands/uL 210   NEUTROS PCT % 79*   LYMPHS PCT % 10*   MONOS PCT % 8   EOS PCT % 1     Results from last 7 days   Lab Units 12/07/19  0832  12/05/19  0505   SODIUM mmol/L 137   < > 138   POTASSIUM mmol/L 3 8   < > 4 2   CHLORIDE mmol/L 107   < > 107   CO2 mmol/L 24   < > 24   BUN mg/dL 32*   < > 32*   CREATININE mg/dL 1 28   < > 1 41*   ANION GAP mmol/L 6   < > 7   CALCIUM mg/dL 8 8   < > 8 0*   ALBUMIN g/dL  --   --  2 7*   TOTAL BILIRUBIN mg/dL  --   --  1 26*   ALK PHOS U/L  --   --  223*   ALT U/L  --   --  23   AST U/L  --   --  36   GLUCOSE RANDOM mg/dL 193*   < > 93    < > = values in this interval not displayed       Results from last 7 days   Lab Units 12/07/19  0832   INR  2 05*     Results from last 7 days   Lab Units 12/07/19  1043 12/07/19  0656 12/06/19  2113 12/06/19  1607 12/06/19  1043 12/06/19  0621 12/05/19  2118 12/05/19  1617 12/05/19  1105 12/05/19  0707 12/05/19  0308   POC GLUCOSE mg/dl 182* 168* 166* 164* 198* 160* 169* 151* 168* 106 96     Results from last 7 days   Lab Units 12/05/19  0505   HEMOGLOBIN A1C % 6 1     Results from last 7 days   Lab Units 12/06/19  0455 12/05/19  0130 12/04/19  2333   LACTIC ACID mmol/L  --  1 3 2 2*   PROCALCITONIN ng/ml 0 33*  --  0 33*           * I Have Reviewed All Lab Data Listed Above  * Additional Pertinent Lab Tests Reviewed: [address] Admission Reviewed    Imaging:    Imaging Reports Reviewed Today Include: all  Imaging Personally Reviewed by Myself Includes:  none    Recent Cultures (last 7 days):     Results from last 7 days   Lab Units 12/04/19  2333   BLOOD CULTURE  No Growth at 48 hrs     GRAM STAIN RESULT  Gram positive cocci in clusters*       Last 24 Hours Medication List:     Current Facility-Administered Medications:  acetaminophen 650 mg Oral Q6H PRN Mile Johnson MD   acyclovir 400 mg Oral Daily Srinivasa Horta MD   albuterol 2 puff Inhalation Q6H PRN Mile Johnson MD   aluminum-magnesium hydroxide-simethicone 15 mL Oral Q6H PRN Mile Johnson MD   ammonium lactate  Topical Daily Mile Johnson MD   atorvastatin 20 mg Oral Daily With Belinda Correa MD   bisacodyl 5 mg Oral Daily PRN Mile Johnson MD   bisacodyl 10 mg Rectal Daily PRN Mile Johnson MD   cephalexin 500 mg Oral Q6H Veronica Tinsley MD   clotrimazole  Topical BID Mile Johnson MD   fluticasone-vilanterol 1 puff Inhalation Daily Mile Johnson MD   insulin glargine 15 Units Subcutaneous HS Ilene Siblye PA-C   insulin lispro 1-5 Units Subcutaneous HS Mile Johnson MD   insulin lispro 5 Units Subcutaneous TID With Meals Ilene Sibley PA-C   metoprolol tartrate 25 mg Oral Q12H [address] [place], [place]   ondansetron 4 mg Intravenous Q6H PRN Manny Toney MD   sodium chloride (PF) 3 mL Intravenous PRN Manny Toney MD   torsemide 20 mg Oral BID (diuretic) Wang Eldridge PA-C   warfarin 7 5 mg Oral Daily (warfarin) Manny Toney MD        Today, Patient Was Seen By: Feng Hussein PA-C    ** Please Note: Dictation voice to text software may have been used in the creation of this document   **

## 2019-12-07 NOTE — ASSESSMENT & PLAN NOTE
· Patient becomes hypoxic during sleep  · Hx of NALLELY but not on CPAP because he was unable to afford co-pay   · Follows Dr Rinku Moura, last seen in august 2019 and sleep study completed in June   · Continue oxygen PRN and qhs while sleeping

## 2019-12-08 LAB
ANION GAP SERPL CALCULATED.3IONS-SCNC: 4 MMOL/L (ref 4–13)
BUN SERPL-MCNC: 39 MG/DL (ref 5–25)
CALCIUM SERPL-MCNC: 8.9 MG/DL (ref 8.3–10.1)
CHLORIDE SERPL-SCNC: 108 MMOL/L (ref 100–108)
CO2 SERPL-SCNC: 27 MMOL/L (ref 21–32)
CREAT SERPL-MCNC: 1.17 MG/DL (ref 0.6–1.3)
GFR SERPL CREATININE-BSD FRML MDRD: 64 ML/MIN/1.73SQ M
GLUCOSE SERPL-MCNC: 130 MG/DL (ref 65–140)
GLUCOSE SERPL-MCNC: 137 MG/DL (ref 65–140)
GLUCOSE SERPL-MCNC: 152 MG/DL (ref 65–140)
GLUCOSE SERPL-MCNC: 190 MG/DL (ref 65–140)
GLUCOSE SERPL-MCNC: 195 MG/DL (ref 65–140)
POTASSIUM SERPL-SCNC: 3.9 MMOL/L (ref 3.5–5.3)
SODIUM SERPL-SCNC: 139 MMOL/L (ref 136–145)

## 2019-12-08 PROCEDURE — 99232 SBSQ HOSP IP/OBS MODERATE 35: CPT | Performed by: INTERNAL MEDICINE

## 2019-12-08 PROCEDURE — 94660 CPAP INITIATION&MGMT: CPT

## 2019-12-08 PROCEDURE — 82948 REAGENT STRIP/BLOOD GLUCOSE: CPT

## 2019-12-08 PROCEDURE — 99232 SBSQ HOSP IP/OBS MODERATE 35: CPT | Performed by: PHYSICIAN ASSISTANT

## 2019-12-08 PROCEDURE — 80048 BASIC METABOLIC PNL TOTAL CA: CPT | Performed by: PHYSICIAN ASSISTANT

## 2019-12-08 PROCEDURE — 94760 N-INVAS EAR/PLS OXIMETRY 1: CPT

## 2019-12-08 RX ADMIN — Medication 1 APPLICATION: at 10:10

## 2019-12-08 RX ADMIN — INSULIN LISPRO 5 UNITS: 100 INJECTION, SOLUTION INTRAVENOUS; SUBCUTANEOUS at 10:11

## 2019-12-08 RX ADMIN — CEPHALEXIN 500 MG: 500 CAPSULE ORAL at 05:45

## 2019-12-08 RX ADMIN — INSULIN LISPRO 5 UNITS: 100 INJECTION, SOLUTION INTRAVENOUS; SUBCUTANEOUS at 12:08

## 2019-12-08 RX ADMIN — ACYCLOVIR 400 MG: 200 CAPSULE ORAL at 10:12

## 2019-12-08 RX ADMIN — ATORVASTATIN CALCIUM 20 MG: 20 TABLET, FILM COATED ORAL at 17:49

## 2019-12-08 RX ADMIN — WARFARIN SODIUM 7.5 MG: 7.5 TABLET ORAL at 17:49

## 2019-12-08 RX ADMIN — CEPHALEXIN 500 MG: 500 CAPSULE ORAL at 12:08

## 2019-12-08 RX ADMIN — METOPROLOL TARTRATE 25 MG: 25 TABLET, FILM COATED ORAL at 21:27

## 2019-12-08 RX ADMIN — CEPHALEXIN 500 MG: 500 CAPSULE ORAL at 17:49

## 2019-12-08 RX ADMIN — TORSEMIDE 20 MG: 20 TABLET ORAL at 10:09

## 2019-12-08 RX ADMIN — METOPROLOL TARTRATE 25 MG: 25 TABLET, FILM COATED ORAL at 10:09

## 2019-12-08 RX ADMIN — CEPHALEXIN 500 MG: 500 CAPSULE ORAL at 00:32

## 2019-12-08 RX ADMIN — INSULIN LISPRO 1 UNITS: 100 INJECTION, SOLUTION INTRAVENOUS; SUBCUTANEOUS at 21:26

## 2019-12-08 RX ADMIN — INSULIN GLARGINE 15 UNITS: 100 INJECTION, SOLUTION SUBCUTANEOUS at 21:24

## 2019-12-08 RX ADMIN — CLOTRIMAZOLE: 10 CREAM TOPICAL at 21:27

## 2019-12-08 RX ADMIN — TORSEMIDE 20 MG: 20 TABLET ORAL at 17:49

## 2019-12-08 RX ADMIN — CLOTRIMAZOLE 1 APPLICATION: 10 CREAM TOPICAL at 10:10

## 2019-12-08 RX ADMIN — FLUTICASONE FUROATE AND VILANTEROL TRIFENATATE 1 PUFF: 200; 25 POWDER RESPIRATORY (INHALATION) at 10:11

## 2019-12-08 NOTE — ASSESSMENT & PLAN NOTE
· Patient becomes hypoxic during sleep  · Hx of NALLELY but not on CPAP because he was unable to afford co-pay   · Follows Dr Mellisa Valladares, last seen in august 2019 and sleep study completed in June   · Continue oxygen PRN and qhs while sleeping   · Will recommend NC O2 for sleep qhs and PRN prior to discharge, will need to confirm with SNF tomorrow if formal O2 eval needs to be done prior to d/c   · D/w RRT, will order CPAP qhs and trial minimum settings

## 2019-12-08 NOTE — ASSESSMENT & PLAN NOTE
· Pt w/ hx of DM, obesity, PVD, AKA L side, and multiple myeloma currently receiving chemo admitted 12/5 for fevers, weakness, and worsening redness of R lower leg on chronic skin changes  Febrile and WBC on admission     · Off of IV abx - now cephalexin until 12/11 per ID   · Infectious disease consult appreciated   · 1 out of 2 Bl cx positive for GPC, suspect contaminant given late growth of staph coag neg   · No sign of osteo on XR  · Encourage LE elevation, compression, aggressive edema control   · Continue to monitor -improving in appearance

## 2019-12-08 NOTE — PROGRESS NOTES
Progress Note - Infectious Disease   Camille Sagastume 81 y o  male MRN: [id number]  Unit/Bed#: -01 Encounter: [id number]      Impression/Recommendations:  1   Sepsis   POA:  Fever, leukocytosis   Due to # 2   Consider UTI given pyuria although denies symptoms   Blood cultures, CXR negative   Serial procalcitonin negative for sepsis threshold   Clinically stable and nontoxic   Improved with antibiotics  Rec:  ? Continue antibiotics as below  ? Follow temperatures closely  ? Supportive care as per the primary service     2   Right leg non-purulent cellulitis   In setting of #3   Consider streptococcal infection given well-demarcated erythema   Markedly improved with IV antibiotics  Rec:  ? Continue Keflex through 12/11 to complete 7 days total of antibiotics  ? Edema control as below     3   Chronic lymphedema   With xerosis, venous stasis dermatitis   Risk factor for above  Rec:  ? Continue Lac-Hydrin lotion  ? Leg elevation while in house  ? Needs better outpatient edema control     4   Coagulase-negative Staph bacteremia  Single set with late growth likely represents contaminant  Rec:  ? No additional workup or treatment indicated     5  Asymptomatic bacteriuria   Likely represents colonization versus contamination   In absence of symptoms does not represent active infection  Rec:  ? No additional antibiotics indicated     6   Mild DORA on CKD   Baseline Cr 1 2-1 4   Likely due to dehydration in setting of recent diarrhea, decreased PO intake   Improved      7   DM with DPN    A1c 6 1   Risk factor for infection      8   PAD   Status post left AKA     9   MM  On Velcade, Decadron   Missed recent cycle     The patient is stable from an ID standpoint      Antibiotics:  Keflex #3  Antibiotics #4    Subjective:  Patient seen on AM rounds  Denies fevers, chills, sweats, nausea, vomiting, or diarrhea      24 Hour Events:  No documented fevers, chills, sweats, nausea, vomiting, or diarrhea  Objective:  Vitals:  Temp:  [97 8 °F (36 6 °C)-97 9 °F (36 6 °C)] 97 8 °F (36 6 °C)  HR:  [71-90] 71  Resp:  [18] 18  BP: (132-165)/(65-81) 136/65  SpO2:  [82 %-96 %] 82 %  Temp (24hrs), Av 8 °F (36 6 °C), Min:97 8 °F (36 6 °C), Max:97 9 °F (36 6 °C)  Current: Temperature: 97 8 °F (36 6 °C)    Physical Exam:   General:  No acute distress  Psychiatric:  Awake and alert  Pulmonary:  Normal respiratory excursion without accessory muscle use  Abdomen:  Soft, nontender  Extremities:  No edema  Skin:  No rashes    Lab Results:  I have personally reviewed pertinent labs  Results from last 7 days   Lab Units 19  0603 19  8159 19  0455 19  0505 19  2333 19  1202   POTASSIUM mmol/L 3 9 3 8 3 9 4 2 4 6 4 3   CHLORIDE mmol/L 108 107 108 107 105 110*   CO2 mmol/L 27 24 24 24 27 28   BUN mg/dL 39* 32* 33* 32* 33* 29*   CREATININE mg/dL 1 17 1 28 1 17 1 41* 1 56* 1 10   EGFR ml/min/1 73sq m 64 58 64 51 45 69   CALCIUM mg/dL 8 9 8 8 8 5 8 0* 8 8 9 7   AST U/L  --   --   --  36 30 15   ALT U/L  --   --   --  23 25 17   ALK PHOS U/L  --   --   --  223* 259* 200*     Results from last 7 days   Lab Units 19  0455 19  0505 19  2333   WBC Thousand/uL 8 90 13 65* 14 27*   HEMOGLOBIN g/dL 10 9* 10 5* 11 4*   PLATELETS Thousands/uL 210 208 227     Results from last 7 days   Lab Units 19  0001 19  2333   BLOOD CULTURE   --  No Growth at 72 hrs  Staphylococcus coagulase negative*   GRAM STAIN RESULT   --  Gram positive cocci in clusters*   URINE CULTURE  >100,000 cfu/ml Escherichia coli*  --        Imaging Studies:   I have personally reviewed pertinent imaging study reports and images in PACS  EKG, Pathology, and Other Studies:   I have personally reviewed pertinent reports

## 2019-12-08 NOTE — ASSESSMENT & PLAN NOTE
Lab Results   Component Value Date    HGBA1C 6 1 12/05/2019     Recent Labs     12/07/19  1043 12/07/19  1619 12/07/19  2120 12/08/19  0644   POCGLU 182* 139 158* 130     Blood Sugar Average: Last 72 hrs:  (P) 153 [id number]   · Suspect patient on too much insulin at home - states nursing home staff gives him snacks before they give him his insulin  · Home regimen was glargine 18 units at night and 30 units with meals at home  · A1C 6 1 - improved from 9 7 on 12/7/18  · Decreased insulin regimen to 15 units + 5 units AC + ISS HS  · Metformin was on hold while inpt    · Continue to monitor

## 2019-12-08 NOTE — ASSESSMENT & PLAN NOTE
· Hx of CKD stage 2-3 per last nephro note  · Concern of DORA - Cr 1 5 on admission  · Baseline appears to be 1 2-1 4  · Monitor with increased dose of demadex

## 2019-12-08 NOTE — PROGRESS NOTES
Progress Note - Lalitha Simpson 1950, 81 y o  male MRN: [id number]  Unit/Bed#: -01 Encounter: [id number] DOS: 12/8/19  Primary Care Provider: Martin Kidd MD   Date and time admitted to hospital: 12/4/2019 10:34 PM    * Sepsis due to cellulitis [facility])  Assessment & Plan  · Pt w/ hx of DM, obesity, PVD, AKA L side, and multiple myeloma currently receiving chemo admitted 12/5 for fevers, weakness, and worsening redness of R lower leg on chronic skin changes  Febrile and WBC on admission  · Off of IV abx - now cephalexin until 12/11 per ID   · Infectious disease consult appreciated   · 1 out of 2 Bl cx positive for GPC, suspect contaminant given late growth of staph coag neg   · No sign of osteo on XR  · Encourage LE elevation, compression, aggressive edema control   · Continue to monitor -improving in appearance    Chronic diastolic (congestive) heart failure (HCC)  Assessment & Plan  Wt Readings from Last 3 Encounters:   12/08/19 (!) 159 kg (350 lb 1 4 oz)   11/05/19 (!) 159 kg (349 lb 14 4 oz)   10/20/19 (!) 159 kg (350 lb 8 5 oz)     · Hx of diastolic HF  · Echo 1/6/42 - EF 60% w/ grade 3 diastolic dysfunction   · Daily weights, I/Os  · Continue lopressor BID   · Torsemide restarted, will increase dose to 20mg BID      Chronic atrial fibrillation  Assessment & Plan  · On warfarin 10 mg daily typically  · Monitor INR    · Continue metoprolol  Di[facility] type 2, uncontrolled ([facility] )  Assessment & Plan  Lab Results   Component Value Date    HGBA1C 6 1 12/05/2019     Recent Labs     12/07/19  1043 12/07/19  1619 12/07/19  2120 12/08/19  0644   POCGLU 182* 139 158* 130     Blood Sugar Average: Last 72 hrs:  (P) 153 [id number]   · Suspect patient on too much insulin at home - states nursing home staff gives him snacks before they give him his insulin     · Home regimen was glargine 18 units at night and 30 units with meals at home  · A1C 6 1 - improved from 9 7 on 12/7/18  · Decreased insulin regimen to 15 units + 5 units AC + ISS HS  · Metformin was on hold while inpt  · Continue to monitor     CKD (chronic kidney disease)  Assessment & Plan  · Hx of CKD stage 2-3 per last nephro note  · Concern of DORA - Cr 1 5 on admission  · Baseline appears to be 1 2-1 4  · Monitor with increased dose of demadex     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)  Assessment & Plan  · Known ambulatory dysfunction  · Awaits PT OT     Multiple myeloma ([facility] )  Assessment & Plan  · Pt not in remission   · Received chemotherapy - Velcade and decadron cycle 1/6   · Prophylactic acyclovir  · Also received venofer for associated anemia     NALLELY (obstructive sleep apnea)  Assessment & Plan  · Patient becomes hypoxic during sleep  · Hx of NALLELY but not on CPAP because he was unable to afford co-pay   · Follows Dr Raymond Zamarripa, last seen in august 2019 and sleep study completed in June   · Continue oxygen PRN and qhs while sleeping   · Will recommend NC O2 for sleep qhs and PRN prior to discharge, will need to confirm with SNF tomorrow if formal O2 eval needs to be done prior to d/c   · D/w RRT, will order CPAP qhs and trial minimum settings     Hyperlipidemia  Assessment & Plan  · Continue Lipitor    VTE Pharmacologic Prophylaxis:   Pharmacologic: Warfarin (Coumadin)  Mechanical VTE Prophylaxis in Place: Yes    Patient Centered Rounds: I have performed bedside rounds with nursing staff today  Discussions with Specialists or Other Care Team Provider: nursing, cm on call, RRT     Education and Discussions with Family / Patient: patient     Time Spent for Care: 30 minutes  More than 50% of total time spent on counseling and coordination of care as described above      Current Length of Stay: 3 day(s)    Current Patient Status: Inpatient   Certification Statement: The patient will continue to require additional inpatient hospital stay due to pending safe d/c plan and pending PT OT, monitoring of BMP with increase torsemide dose, hypoxia     Discharge Plan: pending likely in 24 hours to SNF     Code Status: Level 1 - Full Code    Subjective:   Pt seen and examined at bedside  Pending improvement in      Objective:     Vitals:   Temp (24hrs), Av 8 °F (36 6 °C), Min:97 8 °F (36 6 °C), Max:97 9 °F (36 6 °C)    Temp:  [97 8 °F (36 6 °C)-97 9 °F (36 6 °C)] 97 8 °F (36 6 °C)  HR:  [71-90] 71  Resp:  [18] 18  BP: (132-165)/(65-81) 136/65  SpO2:  [82 %-96 %] 82 %  Body mass index is 43 76 kg/m²  Input and Output Summary (last 24 hours): Intake/Output Summary (Last 24 hours) at 2019 1104  Last data filed at 2019 0801  Gross per 24 hour   Intake 480 ml   Output 1350 ml   Net -870 ml     Physical Exam:     Physical Exam   Constitutional: He is oriented to person, place, and time  He appears well-developed and well-nourished  No distress  HENT:   Head: Normocephalic and atraumatic  Eyes: EOM are normal  No scleral icterus  Neck: Normal range of motion  Neck supple  Cardiovascular: Normal rate, regular rhythm and normal heart sounds  No murmur heard  Pulmonary/Chest: Effort normal and breath sounds normal    Abdominal: Soft  Bowel sounds are normal    Morbidly obese    Musculoskeletal: Normal range of motion  He exhibits deformity (left AKA)  He exhibits no edema (chronic lymphedema RLE)  Neurological: He is alert and oriented to person, place, and time  Skin: Skin is warm and dry  There is erythema (RLE, minimal )  Psychiatric: He has a normal mood and affect  Nursing note and vitals reviewed      Additional Data:     Labs:    Results from last 7 days   Lab Units 19  0455   WBC Thousand/uL 8 90   HEMOGLOBIN g/dL 10 9*   HEMATOCRIT % 36 1*   PLATELETS Thousands/uL 210   NEUTROS PCT % 79*   LYMPHS PCT % 10*   MONOS PCT % 8   EOS PCT % 1     Results from last 7 days   Lab Units 19  0603  19  0505   SODIUM mmol/L 139   < > 138   POTASSIUM mmol/L 3 9   < > 4 2   CHLORIDE mmol/L 108 < > 107   CO2 mmol/L 27   < > 24   BUN mg/dL 39*   < > 32*   CREATININE mg/dL 1 17   < > 1 41*   ANION GAP mmol/L 4   < > 7   CALCIUM mg/dL 8 9   < > 8 0*   ALBUMIN g/dL  --   --  2 7*   TOTAL BILIRUBIN mg/dL  --   --  1 26*   ALK PHOS U/L  --   --  223*   ALT U/L  --   --  23   AST U/L  --   --  36   GLUCOSE RANDOM mg/dL 152*   < > 93    < > = values in this interval not displayed  Results from last 7 days   Lab Units 12/07/19  0832   INR  2 05*     Results from last 7 days   Lab Units 12/08/19  0644 12/07/19  2120 12/07/19  1619 12/07/19  1043 12/07/19  0656 12/06/19  2113 12/06/19  1607 12/06/19  1043 12/06/19  0621 12/05/19  2118 12/05/19  1617 12/05/19  1105   POC GLUCOSE mg/dl 130 158* 139 182* 168* 166* 164* 198* 160* 169* 151* 168*     Results from last 7 days   Lab Units 12/05/19  0505   HEMOGLOBIN A1C % 6 1     Results from last 7 days   Lab Units 12/06/19  0455 12/05/19  0130 12/04/19  2333   LACTIC ACID mmol/L  --  1 3 2 2*   PROCALCITONIN ng/ml 0 33*  --  0 33*     * I Have Reviewed All Lab Data Listed Above  * Additional Pertinent Lab Tests Reviewed: [address] [facility] Admission Reviewed    Imaging:    Imaging Reports Reviewed Today Include: all  Imaging Personally Reviewed by Myself Includes:  none    Recent Cultures (last 7 days):     Results from last 7 days   Lab Units 12/05/19  0001 12/04/19  2333   BLOOD CULTURE   --  No Growth at 72 hrs    Staphylococcus coagulase negative*   GRAM STAIN RESULT   --  Gram positive cocci in clusters*   URINE CULTURE  >100,000 cfu/ml Escherichia coli*  --        Last 24 Hours Medication List:     Current Facility-Administered Medications:  acetaminophen 650 mg Oral Q6H PRN Ronnie Rosenberg MD   acyclovir 400 mg Oral Daily Richardson Doran MD   albuterol 2 puff Inhalation Q6H PRN Ronnie Rosenberg MD   aluminum-magnesium hydroxide-simethicone 15 mL Oral Q6H PRN Ronnie Rosenberg MD   ammonium lactate  Topical Daily Ronnie Rosenberg MD   atorvastatin 20 mg Oral Daily With Beryl Portillo MD   bisacodyl 5 mg Oral Daily PRN Ranjit Winkler MD   bisacodyl 10 mg Rectal Daily PRN Ranjit Winkler MD   cephalexin 500 mg Oral Q6H Jocelyne East MD   clotrimazole  Topical BID Ranjit Winkler MD   fluticasone-vilanterol 1 puff Inhalation Daily Ranjit Winkler MD   insulin glargine 15 Units Subcutaneous HS Ilene Sibley PA-C   insulin lispro 1-5 Units Subcutaneous HS Ranjit Winkler MD   insulin lispro 5 Units Subcutaneous TID With Meals Ilene Sibley PA-C   metoprolol tartrate 25 mg Oral Q12H [address] Ranjit Winkler MD   ondansetron 4 mg Intravenous Q6H PRN Ranjit Winkler MD   sodium chloride (PF) 3 mL Intravenous PRN Ranjit Winkler MD   torsemide 20 mg Oral BID (diuretic) Wang Eldridge PA-C   warfarin 7 5 mg Oral Daily (warfarin) Ranjit Winkler MD        Today, Patient Was Seen By: Candace Mckinney PA-C    ** Please Note: Dictation voice to text software may have been used in the creation of this document   **

## 2019-12-08 NOTE — ASSESSMENT & PLAN NOTE
Wt Readings from Last 3 Encounters:   12/08/19 (!) 159 kg (350 lb 1 4 oz)   11/05/19 (!) 159 kg (349 lb 14 4 oz)   10/20/19 (!) 159 kg (350 lb 8 5 oz)     · Hx of diastolic HF  · Echo 6/0/05 - EF 60% w/ grade 3 diastolic dysfunction   · Daily weights, I/Os  · Continue lopressor BID   · Torsemide restarted, will increase dose to 20mg BID

## 2019-12-09 DIAGNOSIS — C90.00 MULTIPLE MYELOMA NOT HAVING ACHIEVED REMISSION (HCC): Primary | Chronic | ICD-10-CM

## 2019-12-09 LAB
ANION GAP SERPL CALCULATED.3IONS-SCNC: 5 MMOL/L (ref 4–13)
BACTERIA BLD CULT: ABNORMAL
BUN SERPL-MCNC: 37 MG/DL (ref 5–25)
CALCIUM SERPL-MCNC: 8.8 MG/DL (ref 8.3–10.1)
CHLORIDE SERPL-SCNC: 108 MMOL/L (ref 100–108)
CO2 SERPL-SCNC: 26 MMOL/L (ref 21–32)
CREAT SERPL-MCNC: 1.07 MG/DL (ref 0.6–1.3)
GFR SERPL CREATININE-BSD FRML MDRD: 71 ML/MIN/1.73SQ M
GLUCOSE SERPL-MCNC: 131 MG/DL (ref 65–140)
GLUCOSE SERPL-MCNC: 133 MG/DL (ref 65–140)
GLUCOSE SERPL-MCNC: 137 MG/DL (ref 65–140)
GLUCOSE SERPL-MCNC: 167 MG/DL (ref 65–140)
GLUCOSE SERPL-MCNC: 178 MG/DL (ref 65–140)
GRAM STN SPEC: ABNORMAL
INR PPP: 2.02 (ref 0.84–1.19)
POTASSIUM SERPL-SCNC: 4.3 MMOL/L (ref 3.5–5.3)
PROTHROMBIN TIME: 22.4 SECONDS (ref 11.6–14.5)
SODIUM SERPL-SCNC: 139 MMOL/L (ref 136–145)

## 2019-12-09 PROCEDURE — 99232 SBSQ HOSP IP/OBS MODERATE 35: CPT | Performed by: PHYSICIAN ASSISTANT

## 2019-12-09 PROCEDURE — 97167 OT EVAL HIGH COMPLEX 60 MIN: CPT

## 2019-12-09 PROCEDURE — 85610 PROTHROMBIN TIME: CPT | Performed by: PHYSICIAN ASSISTANT

## 2019-12-09 PROCEDURE — G8978 MOBILITY CURRENT STATUS: HCPCS

## 2019-12-09 PROCEDURE — 80048 BASIC METABOLIC PNL TOTAL CA: CPT | Performed by: PHYSICIAN ASSISTANT

## 2019-12-09 PROCEDURE — G8979 MOBILITY GOAL STATUS: HCPCS

## 2019-12-09 PROCEDURE — G8988 SELF CARE GOAL STATUS: HCPCS

## 2019-12-09 PROCEDURE — G8987 SELF CARE CURRENT STATUS: HCPCS

## 2019-12-09 PROCEDURE — 97163 PT EVAL HIGH COMPLEX 45 MIN: CPT

## 2019-12-09 PROCEDURE — 99232 SBSQ HOSP IP/OBS MODERATE 35: CPT | Performed by: INTERNAL MEDICINE

## 2019-12-09 PROCEDURE — 82948 REAGENT STRIP/BLOOD GLUCOSE: CPT

## 2019-12-09 RX ADMIN — METOPROLOL TARTRATE 25 MG: 25 TABLET, FILM COATED ORAL at 08:00

## 2019-12-09 RX ADMIN — Medication 1 APPLICATION: at 08:05

## 2019-12-09 RX ADMIN — TORSEMIDE 20 MG: 20 TABLET ORAL at 17:57

## 2019-12-09 RX ADMIN — CEPHALEXIN 500 MG: 500 CAPSULE ORAL at 23:57

## 2019-12-09 RX ADMIN — ACYCLOVIR 400 MG: 200 CAPSULE ORAL at 08:04

## 2019-12-09 RX ADMIN — CLOTRIMAZOLE: 10 CREAM TOPICAL at 08:04

## 2019-12-09 RX ADMIN — TORSEMIDE 20 MG: 20 TABLET ORAL at 08:00

## 2019-12-09 RX ADMIN — METOPROLOL TARTRATE 25 MG: 25 TABLET, FILM COATED ORAL at 22:31

## 2019-12-09 RX ADMIN — INSULIN LISPRO 5 UNITS: 100 INJECTION, SOLUTION INTRAVENOUS; SUBCUTANEOUS at 17:57

## 2019-12-09 RX ADMIN — INSULIN LISPRO 5 UNITS: 100 INJECTION, SOLUTION INTRAVENOUS; SUBCUTANEOUS at 12:03

## 2019-12-09 RX ADMIN — INSULIN LISPRO 5 UNITS: 100 INJECTION, SOLUTION INTRAVENOUS; SUBCUTANEOUS at 08:01

## 2019-12-09 RX ADMIN — WARFARIN SODIUM 7.5 MG: 7.5 TABLET ORAL at 17:57

## 2019-12-09 RX ADMIN — CEPHALEXIN 500 MG: 500 CAPSULE ORAL at 05:05

## 2019-12-09 RX ADMIN — CLOTRIMAZOLE: 10 CREAM TOPICAL at 17:58

## 2019-12-09 RX ADMIN — CEPHALEXIN 500 MG: 500 CAPSULE ORAL at 17:57

## 2019-12-09 RX ADMIN — INSULIN GLARGINE 15 UNITS: 100 INJECTION, SOLUTION SUBCUTANEOUS at 22:30

## 2019-12-09 RX ADMIN — CEPHALEXIN 500 MG: 500 CAPSULE ORAL at 00:08

## 2019-12-09 RX ADMIN — FLUTICASONE FUROATE AND VILANTEROL TRIFENATATE 1 PUFF: 200; 25 POWDER RESPIRATORY (INHALATION) at 08:01

## 2019-12-09 RX ADMIN — ATORVASTATIN CALCIUM 20 MG: 20 TABLET, FILM COATED ORAL at 17:57

## 2019-12-09 RX ADMIN — CEPHALEXIN 500 MG: 500 CAPSULE ORAL at 12:04

## 2019-12-09 RX ADMIN — INSULIN LISPRO 1 UNITS: 100 INJECTION, SOLUTION INTRAVENOUS; SUBCUTANEOUS at 22:30

## 2019-12-09 NOTE — ASSESSMENT & PLAN NOTE
· Hx of CKD stage 2-3 per last nephro note  · Concern of DORA - Cr 1 5 on admission  · Improved to 1 07 now  · Baseline appears to be 1 2-1 4  · Monitor with increased dose of demadex

## 2019-12-09 NOTE — SOCIAL WORK
PT/OT notes entered  Pt's recommended for STR  Pt has hx with Antion[facility] Hamilton  CM to speak with pt regarding placement options  Pt wants a referral to [facility] for IP rehab  CM sent the referral via [facility]

## 2019-12-09 NOTE — OCCUPATIONAL THERAPY NOTE
[address]  Evaluation     Patient Name: Kalyan Negrete  FLRWY'K Date: 12/9/2019  Problem List  Principal Problem:    Sepsis due to cellulitis [facility])  Active Problems:    Diabetes mellitus type 2, uncontrolled (HCC)    Essential hypertension    Chronic atrial fibrillation    Hyperlipidemia    NALLELY (obstructive sleep apnea)    Multiple myeloma (HCC)    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)    CKD (chronic kidney disease)    Chronic diastolic (congestive) heart failure (HCC)    Lymphedema    Past Medical History  Past Medical History:   Diagnosis Date    Cancer [facility])     CHF (congestive heart failure) (HCC)     Chronic kidney disease     [facility]     Decubitus ulcer of heel     LAST ASSESSED [id number]    Diabetes mellitus ([facility] )     History of varicose veins     Hypertension     Intermittent claudication (HCC)     Neuropathy     Seasonal allergies      Past Surgical History  Past Surgical History:   Procedure Laterality Date    AMPUTATION ABOVE KNEE (AKA) Left 7/31/2019    Procedure: AMPUTATION ABOVE KNEE (AKA); Surgeon: Swapna Sher MD;  Location:  MAIN OR;  Service: General    ANGIOPLASTY      W/ FLUOROSC ANGIOGRAPGY PERIPHERAL ARTERY ADDITIONAL  LAST ASSESSED [id number]    ANGIOPLASTY / STENTING FEMORAL      TANSCATH INTRAVASCULAR STENT PLACEMENT PERCUTANEOUS FEMORAL     CT BONE MARROW BIOPSY AND ASPIRATION  8/9/2019    FULL THICKNESS SKIN GRAFT      TENDON REPAIR      TOE AMPUTATION Left 12/27/2018    Procedure: AMPUTATION left 4th TOE;  Surgeon: Kate Lopez DPM;  Location: [facility] MAIN [facility];  Service: Podiatry           12/09/19 1242   Note Type   Note type Eval only   Restrictions/Precautions   Weight Bearing Precautions Per Order Yes   RLE Weight Bearing Per Order WBAT   Other Precautions Cognitive; Chair Alarm;WBS; Contact/isolation  (L AKA)   Pain Assessment   Pain Assessment No/denies pain   Pain Score No Pain Home Living   Type of Home Assisted living  [facility])   Home Layout One level   [address]; Wheelchair-manual   Additional Comments Pt lives at [facility] where there are no steps to enter  Prior Function   Level of [place] Needs assistance with ADLs and functional mobility; Needs assistance with IADLs   Lives With Facility staff   Receives Help From Personal care attendant   ADL Assistance Needs assistance   IADLs Needs assistance   Falls in the last 6 months 1 to 4   Lifestyle   Autonomy Pt reports that PTA at [facility] he receives assistance with ADLS and IADLS, and is able to transfers to and from w/c with RW independently    Reciprocal Relationships Facility staff assist with ADLS/ IADLS as needed   Service to Others Retired   Intrinsic Gratification Watching TV   ADL   Where Assessed Edge of bed   Eating Assistance 7  Independent   Grooming Assistance 5  Supervision/Setup   UB Bathing Assistance 3  Moderate Assistance   LB [address] 3  Moderate Assistance   [address] 4  Minimal [address] 3  Moderate [address]  2  Maximal Assistance   Bed Mobility   Supine to Sit 4  Minimal assistance   Additional items Assist x 1; Increased time required; Bedrails;HOB elevated   Additional Comments After OT session pt left seated in chair with alarm on and all needs within reach  Transfers   Sit to Stand 2  Maximal assistance   Additional items Assist x 2; Increased time required;Verbal cues   Stand to Sit 2  Maximal assistance   Additional items Assist x 2; Increased time required;Verbal cues   Stand pivot 2  Maximal assistance   Additional items Assist x 2; Increased time required;Verbal cues   Additional Comments Pt unable to come to full STS on 1st attempt  Bed was raised and pt completed sit to stand second time with max A x2  Pt appears to have poor safety insight as he required VC for safety and to not sit before reaching chair      Functional Mobility   Functional Mobility 2  Maximal assistance   Additional Comments Pt was able to hop from bed to chair with RW and max A x2  Balance   Static Sitting Good   Dynamic Sitting Fair +   Static Standing Poor   Dynamic Standing Poor   Ambulatory Poor -   Activity Tolerance   Activity Tolerance Patient limited by fatigue   Medical Staff Made Aware PT [facility]   Nurse Made Aware RN confirmed okay to see and updated after   Cognition   Overall Cognitive Status Impaired   Arousal/Participation Alert   Attention Attends with cues to redirect   Orientation Level Oriented X4   Memory Decreased recall of precautions   Following Commands Follows one step commands with increased time or repetition   Comments Pt joking throughout session and mocking therapists at times  Pt requires VC for safety and technique  Assessment   Limitation Decreased ADL status; Decreased Safe judgement during ADL;Decreased cognition;Decreased endurance;Decreased self-care trans;Decreased high-level ADLs   Prognosis Fair   Assessment Pt is a 81 y o  male admitted to [facility] on 12/4/2019 w/ sepsis due to cellulitis  Comorbidities include a h/o type 2 DM, multiple myeloma, CKD, CHF, lymphedema, L AKA, and cardiomyopathy  Pt with active OT orders and up with assistance  orders  Pt resides at [facility] assisted living facility  PTA pt received help with ADLS and IADLS  Pt reports that he mainly uses w/c for mobility but was able to I transfer to and from w/c with RW  Currently pt is min A for bed mobility, max A x2 for functional mobility and functional transfers, and overall mod A ADLS  Pt is limited at this time 2*: endurance, activity tolerance, functional mobility, forward functional reach, functional mobility, functional standing tolerance and decreased I w/ ADLS/IADLS  The following Occupational Performance Areas to address include: grooming, bathing/shower, toilet hygiene, dressing, functional mobility and clothing management   Pt scored overall  40/100 on the Barthel Index  Based on the aforementioned OT evaluation, functional performance deficits, and assessments, pt has been identified as a high complexity evaluation  From OT standpoint, anticipate d/c STR  Pt to continue to benefit from acute immediate OT services to address the following goals 3-5x/week to  w/in 7-10 days: Jairo Pipe Goals   Patient Goals To return home    LTG Time Frame 7-10   Long Term Goal #1 See goals below    Plan   Treatment Interventions ADL retraining;Functional transfer training; Endurance training;Cognitive reorientation;Patient/family training;Equipment evaluation/education; Compensatory technique education; Energy conservation; Activityengagement;Continued evaluation   Goal Expiration Date 19   OT Frequency 3-5x/wk   Recommendation   OT Discharge Recommendation Short Term Rehab   OT - OK to Discharge Yes  (When medically appropriate)   Barthel Index   Feeding 10   Bathing 0   Grooming Score 0   Dressing Score 5   Bladder Score 10   Bowels Score 5   Toilet Use Score 5   Transfers (Bed/Chair) Score 5   Mobility (Level Surface) Score 0   Stairs Score 0   Barthel Index Score 40   Modified Judy Scale   Modified Judy Scale 4      GOALS    1) Pt will increase activity tolerance to G for 30 min txment sessions    2) Pt will complete UB/LB dressing/self care w/ S using adaptive device and DME as needed    3) Pt will complete bathing w/ S w/ use of AE and DME as needed    4) Pt will complete toileting w/ mod I w/ G hygiene/thoroughness using DME as needed    5) Pt will improve functional transfers to Mod I on/off all surfaces using DME as needed w/ G balance/safety     6) Pt will improve functional mobility during ADL/IADL/leisure tasks to Mod I using DME as needed w/ G balance/safety     7) Pt will participate in simulated IADL management task with DME as needed to increase independence to  w/ G safety and endurance    8) Pt will demonstrate G carryover of pt/caregiver education and training as appropriate      9) Pt will engage in ongoing cognitive assessment w/ G participation to assist w/ safe d/c planning/recommendations as needed    DARRYL Ayon, OTR/L

## 2019-12-09 NOTE — ASSESSMENT & PLAN NOTE
Wt Readings from Last 3 Encounters:   12/09/19 (!) 155 kg (342 lb 2 5 oz)   11/05/19 (!) 159 kg (349 lb 14 4 oz)   10/20/19 (!) 159 kg (350 lb 8 5 oz)     · Hx of diastolic HF  · Echo 8/7/84 - EF 60% w/ grade 3 diastolic dysfunction   · Daily weights, I/Os  · Continue lopressor BID   · Torsemide restarted, increased to 20mg BID

## 2019-12-09 NOTE — PHYSICAL THERAPY NOTE
Physical Therapy Evaluation     Patient's Name: Issa Champion    Admitting Diagnosis  Fever [R50 9]  Cellulitis of right lower extremity [L03 115]  Chronic venous hypertension, right [I87 301]    Problem List  Patient Active Problem List   Diagnosis    Diabetic foot ulcer ([place] [address] )    Diabetes mellitus type 2, uncontrolled ([place] [address] )    Chronic venous hypertension, right    Essential hypertension    Chronic atrial fibrillation    Morbid obesity ([place] [address] )    Acute on chronic diastolic congestive heart failure (HCC)    Cardiomyopathy (HCC)    Diabetes, polyneuropathy ([place] [address] )    GERD (gastroesophageal reflux disease)    Hyperlipidemia    Onychomycosis    Gallstones    Blood alkaline phosphatase increased compared with prior measurement    Localized edema    Peripheral vascular disease (HCC)    SOB (shortness of breath)    NALLELY (obstructive sleep apnea)    Moderate persistent asthma without complication    Biclonal gammopathy    Multiple myeloma ([place] [address] )    Above knee amputation of left lower extremity ([place] [address] )    Sepsis due to cellulitis ([place] [address] )    Hiatal hernia    Weakness    Transaminitis    Elevated troponin    Chronic obstructive pulmonary disease, unspecified (HCC)    Hypertensive chronic kidney disease w stg 1-4/unsp chr kdny    Mass of psoas muscle    CKD (chronic kidney disease)    Chronic diastolic (congestive) heart failure (HCC)    Lymphedema       Past Medical History  Past Medical History:   Diagnosis Date    Cancer ([place] [address] )     CHF (congestive heart failure) (HCC)     Chronic kidney disease     COPD (chronic obstructive pulmonary disease) ([place] [address] )     Decubitus ulcer of heel     LAST ASSESSED [id number]    Diabetes mellitus ([place] [address] )     History of varicose veins     Hypertension     Intermittent claudication (HCC)     Neuropathy     Seasonal allergies        Past Surgical History  Past Surgical History:   Procedure Laterality Date    AMPUTATION ABOVE KNEE (AKA) Left 7/31/2019 Procedure: AMPUTATION ABOVE KNEE (AKA); Surgeon: Anna Calhoun MD;  Location:  MAIN OR;  Service: General    ANGIOPLASTY      W/ FLUOROSC ANGIOGRAPGY PERIPHERAL ARTERY ADDITIONAL  LAST ASSESSED [id number]    ANGIOPLASTY / STENTING FEMORAL      TANSCATH INTRAVASCULAR STENT PLACEMENT PERCUTANEOUS FEMORAL     CT BONE MARROW BIOPSY AND ASPIRATION  8/9/2019    FULL THICKNESS SKIN GRAFT      TENDON REPAIR      TOE AMPUTATION Left 12/27/2018    Procedure: AMPUTATION left 4th TOE;  Surgeon: Julieth Ruiz DPM;  Location: [facility];  Service: Podiatry        12/09/19 1241   Note Type   Note type Eval only   Pain Assessment   Pain Assessment No/denies pain   Pain Score No Pain   Home Living   Type of Home Assisted living  ([address])   Home Layout One level   Home Equipment Walker; Wheelchair-manual   Additional Comments Pt reports he is able to use RW to transfer to [facility] with independence and self propels WC  Prior Function   Level of [place] Needs assistance with ADLs and functional mobility; Needs assistance with IADLs   Lives With Facility staff   Receives Help From Personal care attendant   ADL Assistance Needs assistance   IADLs Needs assistance   Falls in the last 6 months 1 to 4   Comments Pt reports he gets 2 baths/wk with assist    Restrictions/Precautions   Weight Bearing Precautions Per Order Yes   RLE Weight Bearing Per Order WBAT   Braces or Orthoses   (none)   Other Precautions Contact/isolation;Cognitive; Chair Alarm; Bed Alarm; Fall Risk  (L AKA)   General   Family/Caregiver Present No   Cognition   Arousal/Participation Alert   Orientation Level Oriented X4   Memory Decreased recall of precautions   Following Commands Follows one step commands with increased time or repetition   Comments Pt jokes throughout session, ? if pt jokes because unable to answer question  Pt requires cues for safety throughout session as he is with decreased safety awareness     RLE Assessment   RLE Assessment (functionally 4/5)   LLE Assessment   LLE Assessment   (residual limb not tested)   Bed Mobility   Supine to Sit 4  Minimal assistance   Additional items Assist x 1; Increased time required;Verbal cues; Bedrails;HOB elevated   Additional Comments Supine in bed upon PT arrival   Pt left upright in bedside chair with chair alarm intact and all needs in reach  Transfers   Sit to Stand 2  Maximal assistance   Additional items Assist x 2; Increased time required;Verbal cues   Stand to Sit 2  Maximal assistance   Additional items Assist x 2; Increased time required;Verbal cues   Stand pivot 2  Maximal assistance   Additional items Assist x 2; Increased time required;Verbal cues   Additional Comments Transfers with RW  On first STS attempt, pt unable to come to full stand requiring elevated bed on second attempt  Pt with poor insight during SPT, attempting to sit prior to being in safe positioning  Ambulation/Elevation   Gait pattern Not appropriate   Balance   Static Sitting Good   Dynamic Sitting Fair +   Static Standing Poor   Dynamic Standing Poor   Ambulatory Poor -   Endurance Deficit   Endurance Deficit Yes   Endurance Deficit Description SOB, fatigue   Activity Tolerance   Activity Tolerance Patient limited by fatigue   Medical Staff Made Aware OT Shantanufabrice   Nurse Made Aware RN cleared pt to be seen by PT   Assessment   Prognosis Good   Problem List Decreased strength;Decreased endurance; Impaired balance;Decreased mobility; Impaired judgement;Decreased safety awareness;Orthopedic restrictions   Assessment Pt seen for high complexity PT evaluation due to decrease in functional mobility status compared to baseline  Pt with active PT eval/treat and up with assist orders at this time  Pt is a 81 y o  yo M who presented to [facility] with sepsis due to cellulitis on 12/04/19    Pt  has a past medical history of Cancer ([facility] [address] ), CHF (congestive heart failure) ([facility] [address] ), Chronic kidney disease, COPD (chronic obstructive pulmonary disease) ([facility] [address] ), Decubitus ulcer of heel, Diabetes mellitus ([facility] [address] ), History of varicose veins, Hypertension, Intermittent claudication ([facility] [address] ), Neuropathy, and Seasonal allergies  Pt resides at [facility] and reports he is WC bound, however, able to transfer to [facility] with RW independently  Pt presents with decreased strength, balance, endurance, as well as decreased insight and decreased safety awareness that contribute to limitations in bed mobility, functional transfers, functional mobiliy  Pt requires Min A for supine>sit, Max A x 2 for STS and SPT from bed to chair with RW at this time  Pt left upright in bedside chair with chair alarm intact and with all needs in reach  Pt will benefit from skilled therapy in order to address current impairments and functional limitations  PT to follow pt and recommending rehab once medically cleared  Goals   Patient Goals to go home   STG Expiration Date 12/23/19   Short Term Goal #1 1  Pt will demonstrate ability to perform all aspects of bed mobility with supervision A in order to increase independence and decrease burden on caregivers  2  Pt will demonstrate ability to perform functional transfers with supervision A in order to increase independence and decrease burden on caregivers  3  Pt will demonstrate ability to perform WC mobility 200 ft with supervision A in order to return to mobility safely  4  Pt will demonstrate improved balance by one grade order to decrease risk of falls  5  Pt will increase b/l LE strength by 1 grade in order to increase ease of functional mobility and transfers  Plan   Treatment/Interventions Functional transfer training;LE strengthening/ROM; Therapeutic exercise; Endurance training;Patient/family training;Equipment eval/education; Bed mobility;Gait training;Spoke to nursing   PT Frequency Other (Comment)  (3-5x/wk)   Recommendation   Recommendation Post acute IP rehab   PT - OK to Discharge Yes  (to rehab once medically cleared)   Modified Carson Scale   Modified Judy Scale 4   Barthel Index   Feeding 10   Bathing 0   Grooming Score 0   Dressing Score 5   Bladder Score 10   Bowels Score 5   Toilet Use Score 5   Transfers (Bed/Chair) Score 5   Mobility (Level Surface) Score 0   Stairs Score 0   Barthel Index Score 40           Jesica Byrd, PT, DPT

## 2019-12-09 NOTE — PLAN OF CARE
Problem: PHYSICAL THERAPY ADULT  Goal: Performs mobility at highest level of function for planned discharge setting  See evaluation for individualized goals  Description  Treatment/Interventions: Functional transfer training, LE strengthening/ROM, Therapeutic exercise, Endurance training, Patient/family training, Equipment eval/education, Bed mobility, Gait training, Spoke to nursing          See flowsheet documentation for full assessment, interventions and recommendations  Note:   Prognosis: Good  Problem List: Decreased strength, Decreased endurance, Impaired balance, Decreased mobility, Impaired judgement, Decreased safety awareness, Orthopedic restrictions  Assessment: Pt seen for high complexity PT evaluation due to decrease in functional mobility status compared to baseline  Pt with active PT eval/treat and up with assist orders at this time  Pt is a 81 y o  yo M who presented to [facility] with sepsis due to cellulitis on 12/04/19  Pt  has a past medical history of Cancer ([facility] [address] CHF (congestive heart failure) ([facility] [address] ), Chronic kidney disease, COPD (chronic obstructive pulmonary disease) ([facility] [address] ), Decubitus ulcer of heel, Diabetes mellitus ([facility] [address] ), History of varicose veins, Hypertension, Intermittent claudication ([facility] [address] ), Neuropathy, and Seasonal allergies  Pt resides at [facility] and reports he is WC bound, however, able to transfer to [facility] with RW independently  Pt presents with decreased strength, balance, endurance, as well as decreased insight and decreased safety awareness that contribute to limitations in bed mobility, functional transfers, functional mobiliy  Pt requires Min A for supine>sit, Max A x 2 for STS and SPT from bed to chair with RW at this time  Pt left upright in bedside chair with chair alarm intact and with all needs in reach  Pt will benefit from skilled therapy in order to address current impairments and functional limitations   PT to follow pt and recommending rehab once medically cleared  Recommendation: Post acute IP rehab     PT - OK to Discharge: Yes(to rehab once medically cleared)    See flowsheet documentation for full assessment

## 2019-12-09 NOTE — PLAN OF CARE
Problem: OCCUPATIONAL THERAPY ADULT  Goal: Performs self-care activities at highest level of function for planned discharge setting  See evaluation for individualized goals  Description  Treatment Interventions: ADL retraining, Functional transfer training, Endurance training, Cognitive reorientation, Patient/family training, Equipment evaluation/education, Compensatory technique education, Energy conservation, Activityengagement, Continued evaluation          See flowsheet documentation for full assessment, interventions and recommendations  Note:   Limitation: Decreased ADL status, Decreased Safe judgement during ADL, Decreased cognition, Decreased endurance, Decreased self-care trans, Decreased high-level ADLs  Prognosis: Fair  Assessment: Pt is a 81 y o  male [facility] w/ sepsis due to cellulitis  Comorbidities include a h/o type 2 DM, multiple myeloma, CKD, CHF, lymphedema, L AKA, and cardiomyopathy  Pt with active OT orders and up with assistance  orders  Pt resides at [facility] assisted living facility  PTA pt received help with ADLS and IADLS  Pt reports that he mainly uses w/c for mobility but was able to I transfer to and from w/c with RW  Currently pt is min A for bed mobility, max A x2 for functional mobility and functional transfers, and overall mod A ADLS  Pt is limited at this time 2*: endurance, activity tolerance, functional mobility, forward functional reach, functional mobility, functional standing tolerance and decreased I w/ ADLS/IADLS  The following Occupational Performance Areas to address include: grooming, bathing/shower, toilet hygiene, dressing, functional mobility and clothing management  Pt scored overall  40/100 on the Barthel Index  Based on the aforementioned OT evaluation, functional performance deficits, and assessments, pt has been identified as a high complexity evaluation  From OT standpoint, anticipate d/c STR   Pt to continue to benefit from acute immediate OT services to address the following goals 3-5x/week to  w/in 7-10 days:        OT Discharge Recommendation: Short Term Rehab  OT - OK to Discharge: Yes(When medically appropriate)

## 2019-12-09 NOTE — ASSESSMENT & PLAN NOTE
· Patient becomes hypoxic during sleep  · Hx of NALLELY but not on CPAP because he was unable to afford co-pay   · Follows Dr Mary Cabrera, last seen in august 2019 and sleep study completed in June   · Continue oxygen PRN and qhs while sleeping   · Will recommend NC O2 for sleep qhs and PRN prior to discharge  · formal O2 eval needs to be done prior to d/c   · D/w RRT, will order CPAP qhs and trial minimum settings

## 2019-12-09 NOTE — ASSESSMENT & PLAN NOTE
· Pt w/ hx of DM, obesity, PVD, AKA L side, and multiple myeloma currently receiving chemo admitted 12/5 for fevers, weakness, and worsening redness of R lower leg on chronic skin changes  Febrile and WBC on admission     · Off of IV abx - now cephalexin until 12/11 per ID   · Infectious disease consult appreciated   · 1 out of 2 Bl cx positive for GPC, suspect contaminant given late growth of staph coag neg   · No sign of osteo on XR  · Encourage LE elevation, compression, aggressive edema control   · Continue to monitor -improving in appearance  · Podiatry referral upon discharge

## 2019-12-09 NOTE — ASSESSMENT & PLAN NOTE
Lab Results   Component Value Date    HGBA1C 6 1 12/05/2019     Recent Labs     12/08/19  1126 12/08/19  1618 12/08/19  2109 12/09/19  0718   POCGLU 190* 137 195* 131     Blood Sugar Average: Last 72 hrs:  (P) 280 [id number]   · Suspect patient on too much insulin at home - states nursing home staff gives him snacks before they give him his insulin  · Home regimen was glargine 18 units at night and 30 units with meals at home  · A1C 6 1 - improved from 9 7 on 12/7/18  · Decreased insulin regimen to 15 units + 5 units AC + ISS HS  · Metformin was on hold while inpt    · Continue to monitor

## 2019-12-09 NOTE — PROGRESS NOTES
[facility]  Progress Note - Mike Gomezasant 1950, 81 y o  male MRN: [id number]  Unit/Bed#: -01 Encounter: [id number]  Primary Care Provider:  Jeffrey Agosto MD   Date and time admitted to hospital: 12/4/2019 10:34 PM    Lymphedema  Assessment & Plan  · Improving w/ elevation      Chronic diastolic (congestive) heart failure (HCC)  Assessment & Plan  Wt Readings from Last 3 Encounters:   12/09/19 (!) 155 kg (342 lb 2 5 oz)   11/05/19 (!) 159 kg (349 lb 14 4 oz)   10/20/19 (!) 159 kg (350 lb 8 5 oz)     · Hx of diastolic HF  · Echo 6/6/06 - EF 60% w/ grade 3 diastolic dysfunction   · Daily weights, I/Os  · Continue lopressor BID   · Torsemide restarted, increased to 20mg BID      CKD (chronic kidney disease)  Assessment & Plan  · Hx of CKD stage 2-3 per last nephro note  · Concern of DORA - Cr 1 5 on admission  · Improved to 1 07 now  · Baseline appears to be 1 2-1 4  · Monitor with increased dose of demadex     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)  Assessment & Plan  · Known ambulatory dysfunction  · Awaits PT OT, anticipate discharge to rehab    Multiple myeloma [facility])  Assessment & Plan  · Pt not in remission   · Received chemotherapy - Velcade and decadron cycle 1/6   · Prophylactic acyclovir  · Also received venofer for associated anemia     NALLELY (obstructive sleep apnea)  Assessment & Plan  · Patient becomes hypoxic during sleep  · Hx of NALLELY but not on CPAP because he was unable to afford co-pay   · Follows Dr Raymond Zamarripa, last seen in august 2019 and sleep study completed in June   · Continue oxygen PRN and qhs while sleeping   · Will recommend NC O2 for sleep qhs and PRN prior to discharge  · formal O2 eval needs to be done prior to d/c   · D/w RRT, will order CPAP qhs and trial minimum settings     Hyperlipidemia  Assessment & Plan  · Continue Lipitor    Chronic atrial fibrillation  Assessment & Plan  · On warfarin 10 mg daily typically  · Monitor INR    · Continue metoprolol  Essential hypertension  Assessment & Plan  · Pt hypertensive today - can restart Demadex w/ improved Cr  · Continue metoprolol  · Monitor BP     Diabetes mellitus type 2, uncontrolled [facility])  Assessment & Plan  Lab Results   Component Value Date    HGBA1C 6 1 12/05/2019     Recent Labs     12/08/19  1126 12/08/19  1618 12/08/19  2109 12/09/19  0718   POCGLU 190* 137 195* 131     Blood Sugar Average: Last 72 hrs:  (P) 317 [id number]   · Suspect patient on too much insulin at home - states nursing home staff gives him snacks before they give him his insulin  · Home regimen was glargine 18 units at night and 30 units with meals at home  · A1C 6 1 - improved from 9 7 on 12/7/18  · Decreased insulin regimen to 15 units + 5 units AC + ISS HS  · Metformin was on hold while inpt  · Continue to monitor     * Sepsis due to cellulitis [facility])  Assessment & Plan  · Pt w/ hx of DM, obesity, PVD, AKA L side, and multiple myeloma currently receiving chemo admitted 12/5 for fevers, weakness, and worsening redness of R lower leg on chronic skin changes  Febrile and WBC on admission  · Off of IV abx - now cephalexin until 12/11 per ID   · Infectious disease consult appreciated   · 1 out of 2 Bl cx positive for GPC, suspect contaminant given late growth of staph coag neg   · No sign of osteo on XR  · Encourage LE elevation, compression, aggressive edema control   · Continue to monitor -improving in appearance      VTE Pharmacologic Prophylaxis:   Pharmacologic: Warfarin (Coumadin)  Mechanical VTE Prophylaxis in Place: No    Patient Centered Rounds: I have performed bedside rounds with nursing staff today  Discussions with Specialists or Other Care Team Provider: Appreciate input from ID note 12/9    Education and Discussions with Family / Patient: Discussed care plan with patient at bedside  Answered all questions to the best of my ability  Time Spent for Care: 20 minutes    More than 50% of total time spent on counseling and coordination of care as described above  Current Length of Stay: 4 day(s)    Current Patient Status: Inpatient   Certification Statement: The patient will continue to require additional inpatient hospital stay due to Discharge planning, O2 evaluation    Discharge Plan: Patient to be discharged to rehab pending O2 evaluation  Code Status: Level 1 - Full Code      Subjective:   He shunt reports continued shortness of breath  He did not like using the CPAP overnight  Denies leg pain  Believes redness is resolving  Asks about  Having orthopedic shoes made outpatient  Recommended follow-up with Podiatry  Referrals to be made upon discharge  Objective:     Vitals:   Temp (24hrs), Av 7 °F (36 5 °C), Min:97 5 °F (36 4 °C), Max:98 °F (36 7 °C)    Temp:  [97 5 °F (36 4 °C)-98 °F (36 7 °C)] 97 5 °F (36 4 °C)  HR:  [66-71] 71  Resp:  [16-20] 16  BP: (131-167)/(67-83) 167/75  SpO2:  [93 %-97 %] 94 %  Body mass index is 42 77 kg/m²  Input and Output Summary (last 24 hours): Intake/Output Summary (Last 24 hours) at 2019 1105  Last data filed at 2019 0801  Gross per 24 hour   Intake 480 ml   Output 2255 ml   Net -1775 ml       Physical Exam:     Physical Exam   Constitutional: He appears well-developed and well-nourished  No distress  HENT:   Head: Normocephalic and atraumatic  Eyes: Conjunctivae are normal  No scleral icterus  Cardiovascular: Normal rate and regular rhythm  No murmur heard  Pulmonary/Chest: Effort normal and breath sounds normal  No respiratory distress  He has no wheezes  He has no rales  Abdominal: Soft  He exhibits no distension  There is no tenderness  Musculoskeletal: He exhibits no edema  Left hip: He exhibits deformity (AKA)  Neurological: He is alert  Skin: Skin is warm and dry  There is erythema (RLE, improving)  Chronic vascular changes RLE   Psychiatric: He has a normal mood and affect     Nursing note and vitals reviewed  Additional Data:     Labs:    Results from last 7 days   Lab Units 12/06/19  0455   WBC Thousand/uL 8 90   HEMOGLOBIN g/dL 10 9*   HEMATOCRIT % 36 1*   PLATELETS Thousands/uL 210   NEUTROS PCT % 79*   LYMPHS PCT % 10*   MONOS PCT % 8   EOS PCT % 1     Results from last 7 days   Lab Units 12/09/19  0546  12/05/19  0505   SODIUM mmol/L 139   < > 138   POTASSIUM mmol/L 4 3   < > 4 2   CHLORIDE mmol/L 108   < > 107   CO2 mmol/L 26   < > 24   BUN mg/dL 37*   < > 32*   CREATININE mg/dL 1 07   < > 1 41*   ANION GAP mmol/L 5   < > 7   CALCIUM mg/dL 8 8   < > 8 0*   ALBUMIN g/dL  --   --  2 7*   TOTAL BILIRUBIN mg/dL  --   --  1 26*   ALK PHOS U/L  --   --  223*   ALT U/L  --   --  23   AST U/L  --   --  36   GLUCOSE RANDOM mg/dL 137   < > 93    < > = values in this interval not displayed  Results from last 7 days   Lab Units 12/09/19  0546   INR  2 02*     Results from last 7 days   Lab Units 12/09/19  0718 12/08/19  2109 12/08/19  1618 12/08/19  1126 12/08/19  0644 12/07/19  2120 12/07/19  1619 12/07/19  1043 12/07/19  0656 12/06/19  2113 12/06/19  1607 12/06/19  1043   POC GLUCOSE mg/dl 131 195* 137 190* 130 158* 139 182* 168* 166* 164* 198*     Results from last 7 days   Lab Units 12/05/19  0505   HEMOGLOBIN A1C % 6 1     Results from last 7 days   Lab Units 12/06/19  0455 12/05/19  0130 12/04/19  2333   LACTIC ACID mmol/L  --  1 3 2 2*   PROCALCITONIN ng/ml 0 33*  --  0 33*           * I Have Reviewed All Lab Data Listed Above  * Additional Pertinent Lab Tests Reviewed: All Labs Within Last 24 Hours Reviewed    Imaging:    Imaging Reports Reviewed Today Include: None  Imaging Personally Reviewed by Myself Includes:  None    Recent Cultures (last 7 days):     Results from last 7 days   Lab Units 12/05/19  0001 12/04/19  2333   BLOOD CULTURE   --  No Growth After 4 Days    Staphylococcus coagulase negative*   GRAM STAIN RESULT   --  Gram positive cocci in clusters*   URINE CULTURE  >100,000 cfu/ml Escherichia coli*  --        Last 24 Hours Medication List:     Current Facility-Administered Medications:  acetaminophen 650 mg Oral Q6H PRN Ronnie Rosenberg MD   acyclovir 400 mg Oral Daily Richardson Doran MD   albuterol 2 puff Inhalation Q6H PRN Ronnie Rosenberg MD   aluminum-magnesium hydroxide-simethicone 15 mL Oral Q6H PRN Ronnie Rosenberg MD   ammonium lactate  Topical Daily Ronnie Rosenberg MD   atorvastatin 20 mg Oral Daily With Billy Trinh MD   bisacodyl 5 mg Oral Daily PRN Ronnie Rosenberg MD   bisacodyl 10 mg Rectal Daily PRN Ronnie Rosenberg MD   cephalexin 500 mg Oral Q6H Milton Mahoney MD   clotrimazole  Topical BID Ronnie Rosenberg MD   fluticasone-vilanterol 1 puff Inhalation Daily Ronnie Rosenberg MD   insulin glargine 15 Units Subcutaneous HS Ilene Sibley PA-C   insulin lispro 1-5 Units Subcutaneous HS Ronnie Rosenberg MD   insulin lispro 5 Units Subcutaneous TID With Meals Ilene Sibley PA-C   metoprolol tartrate 25 mg Oral Q12H [address] Ronnie Rosenberg MD   ondansetron 4 mg Intravenous Q6H PRN Ronnie Rosenberg MD   sodium chloride (PF) 3 mL Intravenous PRN Ronnie Rosenberg MD   torsemide 20 mg Oral BID (diuretic) Wang Eldridge PA-C   warfarin 7 5 mg Oral Daily (warfarin) Ronnie Rosenberg MD        Today, Patient Was Seen By: Marlene Boyer PA-C    ** Please Note: Dictation voice to text software may have been used in the creation of this document   **

## 2019-12-09 NOTE — PROGRESS NOTES
Progress Note - Infectious Disease   Terry Loredo 81 y o  male MRN: [id number]  Unit/Bed#: -01 Encounter: [id number]      Impression/Recommendations:  1   Sepsis   POA:  Fever, leukocytosis   Due to # 2   Doubt UTI given lack of  symptoms   Blood cultures, CXR negative   Serial procalcitonin negative for sepsis threshold   Clinically stable and nontoxic   Improved with antibiotics      Rec:  ? Continue antibiotics as below  ? Follow temperatures closely  ? Supportive care as per the primary service     2   Right leg non-purulent cellulitis   In setting of #3   Consider streptococcal infection given well-demarcated erythema   Markedly improved with IV antibiotics  Rec:  ? Continue Keflex through 12/11 to complete 7 days total of antibiotics  ? Edema control as below     3   Chronic lymphedema   With xerosis, venous stasis dermatitis   Risk factor for above  Rec:  ? Continue Lac-Hydrin lotion  ? Leg elevation while in house  ? Needs better outpatient edema control     4   Coagulase-negative Staph bacteremia   Single set with late growth likely represents contaminant     Rec:  ? No additional workup or treatment indicated     5   Asymptomatic bacteriuria   Likely represents colonization versus contamination   In absence of symptoms does not represent active infection  Rec:  ? No additional antibiotics indicated     6   Mild DORA on CKD   Baseline Cr 1 2-1 4   Likely due to dehydration in setting of recent diarrhea, decreased PO intake   Improved      7   DM with DPN    A1c 6 1   Risk factor for infection      8   PAD   Status post left AKA     9   MM  On Velcade, Decadron   Missed recent cycle     The patient is stable from an ID standpoint for D/C      Antibiotics:  Keflex #4  Antibiotics #5    Subjective:  Patient seen on AM rounds  Feels well  Denies fevers, chills, sweats, nausea, vomiting, or diarrhea      24 Hour Events:  No documented fevers, chills, sweats, nausea, vomiting, or diarrhea  Objective:  Vitals:  Temp:  [97 5 °F (36 4 °C)-98 °F (36 7 °C)] 97 5 °F (36 4 °C)  HR:  [66-71] 71  Resp:  [16-20] 16  BP: (131-167)/(67-83) 167/75  SpO2:  [93 %-97 %] 94 %  Temp (24hrs), Av 7 °F (36 5 °C), Min:97 5 °F (36 4 °C), Max:98 °F (36 7 °C)  Current: Temperature: 97 5 °F (36 4 °C)    Physical Exam:   General:  No acute distress  Psychiatric:  Awake and alert  Pulmonary:  Normal respiratory excursion without accessory muscle use  Abdomen:  Soft, nontender  Extremities:  Stable brawny edema with hyperpigmentation  Resolved cellulitis  Skin:  No rashes    Lab Results:  I have personally reviewed pertinent labs  Results from last 7 days   Lab Units 19  0546 19  0603 19  0832  19  0505 19  2333 19  1202   POTASSIUM mmol/L 4 3 3 9 3 8   < > 4 2 4 6 4 3   CHLORIDE mmol/L 108 108 107   < > 107 105 110*   CO2 mmol/L 26 27 24   < > 24 27 28   BUN mg/dL 37* 39* 32*   < > 32* 33* 29*   CREATININE mg/dL 1 07 1 17 1 28   < > 1 41* 1 56* 1 10   EGFR ml/min/1 73sq m 71 64 58   < > 51 45 69   CALCIUM mg/dL 8 8 8 9 8 8   < > 8 0* 8 8 9 7   AST U/L  --   --   --   --  36 30 15   ALT U/L  --   --   --   --  23 25 17   ALK PHOS U/L  --   --   --   --  223* 259* 200*    < > = values in this interval not displayed  Results from last 7 days   Lab Units 19  0455 19  0505 19  2333   WBC Thousand/uL 8 90 13 65* 14 27*   HEMOGLOBIN g/dL 10 9* 10 5* 11 4*   PLATELETS Thousands/uL 210 208 227     Results from last 7 days   Lab Units 19  0001 19  2333   BLOOD CULTURE   --  No Growth After 4 Days  Staphylococcus coagulase negative*   GRAM STAIN RESULT   --  Gram positive cocci in clusters*   URINE CULTURE  >100,000 cfu/ml Escherichia coli*  --        Imaging Studies:   I have personally reviewed pertinent imaging study reports and images in PACS  EKG, Pathology, and Other Studies:   I have personally reviewed pertinent reports

## 2019-12-10 ENCOUNTER — HOSPITAL ENCOUNTER (OUTPATIENT)
Dept: INFUSION CENTER | Facility: HOSPITAL | Age: 69
Discharge: HOME | End: 2019-12-10
Attending: INTERNAL MEDICINE

## 2019-12-10 LAB
ANION GAP SERPL CALCULATED.3IONS-SCNC: 5 MMOL/L (ref 4–13)
BACTERIA BLD CULT: NORMAL
BASOPHILS # BLD AUTO: 0.11 THOUSANDS/ΜL (ref 0–0.1)
BASOPHILS NFR BLD AUTO: 2 % (ref 0–1)
BUN SERPL-MCNC: 41 MG/DL (ref 5–25)
CALCIUM SERPL-MCNC: 8.8 MG/DL (ref 8.3–10.1)
CHLORIDE SERPL-SCNC: 107 MMOL/L (ref 100–108)
CO2 SERPL-SCNC: 28 MMOL/L (ref 21–32)
CREAT SERPL-MCNC: 1.16 MG/DL (ref 0.6–1.3)
EOSINOPHIL # BLD AUTO: 0.3 THOUSAND/ΜL (ref 0–0.61)
EOSINOPHIL NFR BLD AUTO: 4 % (ref 0–6)
ERYTHROCYTE [DISTWIDTH] IN BLOOD BY AUTOMATED COUNT: 15.2 % (ref 11.6–15.1)
GFR SERPL CREATININE-BSD FRML MDRD: 65 ML/MIN/1.73SQ M
GLUCOSE SERPL-MCNC: 119 MG/DL (ref 65–140)
GLUCOSE SERPL-MCNC: 122 MG/DL (ref 65–140)
GLUCOSE SERPL-MCNC: 170 MG/DL (ref 65–140)
GLUCOSE SERPL-MCNC: 183 MG/DL (ref 65–140)
GLUCOSE SERPL-MCNC: 192 MG/DL (ref 65–140)
HCT VFR BLD AUTO: 36.8 % (ref 36.5–49.3)
HGB BLD-MCNC: 11.1 G/DL (ref 12–17)
IMM GRANULOCYTES # BLD AUTO: 0.11 THOUSAND/UL (ref 0–0.2)
IMM GRANULOCYTES NFR BLD AUTO: 2 % (ref 0–2)
LYMPHOCYTES # BLD AUTO: 1.31 THOUSANDS/ΜL (ref 0.6–4.47)
LYMPHOCYTES NFR BLD AUTO: 18 % (ref 14–44)
MCH RBC QN AUTO: 27.8 PG (ref 26.8–34.3)
MCHC RBC AUTO-ENTMCNC: 30.2 G/DL (ref 31.4–37.4)
MCV RBC AUTO: 92 FL (ref 82–98)
MONOCYTES # BLD AUTO: 0.61 THOUSAND/ΜL (ref 0.17–1.22)
MONOCYTES NFR BLD AUTO: 8 % (ref 4–12)
NEUTROPHILS # BLD AUTO: 5.05 THOUSANDS/ΜL (ref 1.85–7.62)
NEUTS SEG NFR BLD AUTO: 66 % (ref 43–75)
NRBC BLD AUTO-RTO: 0 /100 WBCS
PLATELET # BLD AUTO: 359 THOUSANDS/UL (ref 149–390)
PMV BLD AUTO: 10.6 FL (ref 8.9–12.7)
POTASSIUM SERPL-SCNC: 3.8 MMOL/L (ref 3.5–5.3)
RBC # BLD AUTO: 3.99 MILLION/UL (ref 3.88–5.62)
SODIUM SERPL-SCNC: 140 MMOL/L (ref 136–145)
WBC # BLD AUTO: 7.49 THOUSAND/UL (ref 4.31–10.16)

## 2019-12-10 PROCEDURE — 99232 SBSQ HOSP IP/OBS MODERATE 35: CPT | Performed by: PHYSICIAN ASSISTANT

## 2019-12-10 PROCEDURE — 80048 BASIC METABOLIC PNL TOTAL CA: CPT | Performed by: PHYSICIAN ASSISTANT

## 2019-12-10 PROCEDURE — 85025 COMPLETE CBC W/AUTO DIFF WBC: CPT | Performed by: PHYSICIAN ASSISTANT

## 2019-12-10 PROCEDURE — 82948 REAGENT STRIP/BLOOD GLUCOSE: CPT

## 2019-12-10 PROCEDURE — 99232 SBSQ HOSP IP/OBS MODERATE 35: CPT | Performed by: INTERNAL MEDICINE

## 2019-12-10 RX ADMIN — ACETAMINOPHEN 650 MG: 325 TABLET ORAL at 22:12

## 2019-12-10 RX ADMIN — METOPROLOL TARTRATE 25 MG: 25 TABLET, FILM COATED ORAL at 22:12

## 2019-12-10 RX ADMIN — INSULIN LISPRO 5 UNITS: 100 INJECTION, SOLUTION INTRAVENOUS; SUBCUTANEOUS at 17:23

## 2019-12-10 RX ADMIN — INSULIN LISPRO 5 UNITS: 100 INJECTION, SOLUTION INTRAVENOUS; SUBCUTANEOUS at 12:24

## 2019-12-10 RX ADMIN — INSULIN LISPRO 1 UNITS: 100 INJECTION, SOLUTION INTRAVENOUS; SUBCUTANEOUS at 22:12

## 2019-12-10 RX ADMIN — CEPHALEXIN 500 MG: 500 CAPSULE ORAL at 12:24

## 2019-12-10 RX ADMIN — Medication: at 09:21

## 2019-12-10 RX ADMIN — ACYCLOVIR 400 MG: 200 CAPSULE ORAL at 09:20

## 2019-12-10 RX ADMIN — CEPHALEXIN 500 MG: 500 CAPSULE ORAL at 23:51

## 2019-12-10 RX ADMIN — CEPHALEXIN 500 MG: 500 CAPSULE ORAL at 05:40

## 2019-12-10 RX ADMIN — METOPROLOL TARTRATE 25 MG: 25 TABLET, FILM COATED ORAL at 09:21

## 2019-12-10 RX ADMIN — CLOTRIMAZOLE: 10 CREAM TOPICAL at 17:23

## 2019-12-10 RX ADMIN — FLUTICASONE FUROATE AND VILANTEROL TRIFENATATE 1 PUFF: 200; 25 POWDER RESPIRATORY (INHALATION) at 09:20

## 2019-12-10 RX ADMIN — WARFARIN SODIUM 7.5 MG: 7.5 TABLET ORAL at 17:24

## 2019-12-10 RX ADMIN — TORSEMIDE 20 MG: 20 TABLET ORAL at 17:23

## 2019-12-10 RX ADMIN — INSULIN GLARGINE 15 UNITS: 100 INJECTION, SOLUTION SUBCUTANEOUS at 22:12

## 2019-12-10 RX ADMIN — ATORVASTATIN CALCIUM 20 MG: 20 TABLET, FILM COATED ORAL at 17:24

## 2019-12-10 RX ADMIN — CLOTRIMAZOLE: 10 CREAM TOPICAL at 09:20

## 2019-12-10 RX ADMIN — TORSEMIDE 20 MG: 20 TABLET ORAL at 09:21

## 2019-12-10 RX ADMIN — CEPHALEXIN 500 MG: 500 CAPSULE ORAL at 17:24

## 2019-12-10 NOTE — SOCIAL WORK
Pt accepted at [facility] [place] for rehab  Pt will need authorization  [facility] started authorization for placement

## 2019-12-10 NOTE — ASSESSMENT & PLAN NOTE
· Pt w/ hx of DM, obesity, PVD, AKA L side, and multiple myeloma currently receiving chemo admitted 12/5 for fevers, weakness, and worsening redness of R lower leg on chronic skin changes  Febrile and WBC on admission     · Off of IV abx - now cephalexin until 12/11 per ID   · Infectious disease consult appreciated   · 1 out of 2 Bl cx positive for GPC, suspect contaminant given late growth of staph coag neg   · No sign of osteo on XR  · Encourage LE elevation, compression, aggressive edema control   · Recommend compression stocking or ACE wrap outpatient  · Continue to monitor -improving in appearance  · Podiatry referral upon discharge

## 2019-12-10 NOTE — ASSESSMENT & PLAN NOTE
Wt Readings from Last 3 Encounters:   12/09/19 (!) 155 kg (342 lb 2 5 oz)   11/05/19 (!) 159 kg (349 lb 14 4 oz)   10/20/19 (!) 159 kg (350 lb 8 5 oz)     · Hx of diastolic HF  · Echo 8/1/96 - EF 60% w/ grade 3 diastolic dysfunction   · Daily weights, I/Os  · Continue lopressor BID   · Torsemide restarted, increased to 20mg BID

## 2019-12-10 NOTE — PLAN OF CARE
Problem: Potential for Falls  Goal: Patient will remain free of falls  Description  INTERVENTIONS:  - Assess patient frequently for physical needs  -  Identify cognitive and physical deficits and behaviors that affect risk of falls    -  [place] fall precautions as indicated by assessment   - Educate patient/family on patient safety including physical limitations  - Instruct patient to call for assistance with activity based on assessment  - Modify environment to reduce risk of injury  - Consider OT/PT consult to assist with strengthening/mobility  Outcome: Progressing

## 2019-12-10 NOTE — ASSESSMENT & PLAN NOTE
· Hx of CKD stage 2-3 per last nephro note  · Concern of DORA - Cr 1 5 on admission  · Improved to 1 16 now  · Baseline appears to be 1 2-1 4  · Monitor with increased dose of demadex

## 2019-12-10 NOTE — PROGRESS NOTES
[facility]  Progress Note - Bethanie Rios 1950, 81 y o  male MRN: [id number]  Unit/Bed#: -01 Encounter: [id number]  Primary Care Provider:  Obdulia Ellison MD   Date and time admitted to hospital: 12/4/2019 10:34 PM    Lymphedema  Assessment & Plan  · Improving w/ elevation      Chronic diastolic (congestive) heart failure (HCC)  Assessment & Plan  Wt Readings from Last 3 Encounters:   12/09/19 (!) 155 kg (342 lb 2 5 oz)   11/05/19 (!) 159 kg (349 lb 14 4 oz)   10/20/19 (!) 159 kg (350 lb 8 5 oz)     · Hx of diastolic HF  · Echo 7/1/77 - EF 60% w/ grade 3 diastolic dysfunction   · Daily weights, I/Os  · Continue lopressor BID   · Torsemide restarted, increased to 20mg BID      CKD (chronic kidney disease)  Assessment & Plan  · Hx of CKD stage 2-3 per last nephro note  · Concern of DORA - Cr 1 5 on admission  · Improved to 1 16 now  · Baseline appears to be 1 2-1 4  · Monitor with increased dose of demadex     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)  Assessment & Plan  · Known ambulatory dysfunction  · Awaits PT OT, anticipate discharge to rehab, awaiting authorization    Multiple myeloma ([facility] [address] )  Assessment & Plan  · Pt not in remission   · Received chemotherapy - Velcade and decadron cycle 1/6   · Prophylactic acyclovir  · Also received venofer for associated anemia     NALLELY (obstructive sleep apnea)  Assessment & Plan  · Patient becomes hypoxic during sleep  · Hx of NALLELY but not on CPAP because he was unable to afford co-pay   · Follows Dr Juan C Ace, last seen in august 2019 and sleep study completed in June   · Continue oxygen PRN and qhs while sleeping   · Will recommend NC O2 for sleep qhs and PRN prior to discharge  · Can order O2 when patient is discharged to SNF, will need to be evaluated there, prior to discharge for home O2  · D/w RRT, will order CPAP qhs and trial minimum settings     Hyperlipidemia  Assessment & Plan  · Continue Lipitor    Chronic atrial fibrillation  Assessment & Plan  · On warfarin 10 mg daily typically  · Monitor INR    · Continue metoprolol  Essential hypertension  Assessment & Plan  · Pt hypertensive today - can restart Demadex w/ improved Cr  · Continue metoprolol  · Monitor BP     Diabetes mellitus type 2, uncontrolled ([facility] [address] )  Assessment & Plan  Lab Results   Component Value Date    HGBA1C 6 1 12/05/2019     Recent Labs     12/09/19  1640 12/09/19  2139 12/10/19  0642 12/10/19  1109   POCGLU 133 167* 122 183*     Blood Sugar Average: Last 72 hrs:  (P) 297 [id number]   · Suspect patient on too much insulin at home - states nursing home staff gives him snacks before they give him his insulin  · Home regimen was glargine 18 units at night and 30 units with meals at home  · A1C 6 1 - improved from 9 7 on 12/7/18  · Decreased insulin regimen to 15 units + 5 units AC + ISS HS  · Metformin was on hold while inpt  · Continue to monitor     * Sepsis due to cellulitis [facility])  Assessment & Plan  · Pt w/ hx of DM, obesity, PVD, AKA L side, and multiple myeloma currently receiving chemo admitted 12/5 for fevers, weakness, and worsening redness of R lower leg on chronic skin changes  Febrile and WBC on admission  · Off of IV abx - now cephalexin until 12/11 per ID   · Infectious disease consult appreciated   · 1 out of 2 Bl cx positive for GPC, suspect contaminant given late growth of staph coag neg   · No sign of osteo on XR  · Encourage LE elevation, compression, aggressive edema control   · Recommend compression stocking or ACE wrap outpatient  · Continue to monitor -improving in appearance  · Podiatry referral upon discharge      VTE Pharmacologic Prophylaxis:   Pharmacologic: Warfarin (Coumadin)  Mechanical VTE Prophylaxis in Place: No    Patient Centered Rounds: I have performed bedside rounds with nursing staff today      Discussions with Specialists or Other Care Team Provider: Appreciate input from ID note, plan to continue abx    Education and Discussions with Family / Patient: Discussed care plan with patient at bedside  Time Spent for Care: 20 minutes  More than 50% of total time spent on counseling and coordination of care as described above  Current Length of Stay: 5 day(s)    Current Patient Status: Inpatient   Certification Statement: The patient will continue to require additional inpatient hospital stay due to Discharge planning, awaiting authorization    Discharge Plan: Patient comes from [facility], plan to discharge to [facility] pending authorization from insurance company  Medically stable at this time  Code Status: Level 1 - Full Code      Subjective:   Patient reports he is tired but otherwise feels well  The Cpap is "not too bad" He is eager for discharge to rehab  Objective:     Vitals:   Temp (24hrs), Av 5 °F (36 4 °C), Min:97 2 °F (36 2 °C), Max:97 8 °F (36 6 °C)    Temp:  [97 2 °F (36 2 °C)-97 8 °F (36 6 °C)] 97 2 °F (36 2 °C)  HR:  [61-73] 61  Resp:  [18-20] 18  BP: (124-170)/(58-75) 132/58  SpO2:  [94 %-97 %] 97 %  Body mass index is 42 77 kg/m²  Input and Output Summary (last 24 hours): Intake/Output Summary (Last 24 hours) at 12/10/2019 1436  Last data filed at 12/10/2019 1200  Gross per 24 hour   Intake 1080 ml   Output 2625 ml   Net -1545 ml       Physical Exam:     Physical Exam   Constitutional: He appears well-developed and well-nourished  No distress  HENT:   Head: Normocephalic and atraumatic  Eyes: Conjunctivae are normal  No scleral icterus  Cardiovascular: Normal rate and regular rhythm  No murmur heard  Pulmonary/Chest: Effort normal and breath sounds normal  No respiratory distress  He has no wheezes  He has no rales  Abdominal: Soft  He exhibits no distension  There is no tenderness  Musculoskeletal: He exhibits no edema  S/P L AKA   Neurological: He is alert  Skin: Skin is warm and dry  No erythema     Chronic vascular changes of right lower extremity, erythema resolving   Psychiatric: He has a normal mood and affect  Nursing note and vitals reviewed  Additional Data:     Labs:    Results from last 7 days   Lab Units 12/10/19  0525   WBC Thousand/uL 7 49   HEMOGLOBIN g/dL 11 1*   HEMATOCRIT % 36 8   PLATELETS Thousands/uL 359   NEUTROS PCT % 66   LYMPHS PCT % 18   MONOS PCT % 8   EOS PCT % 4     Results from last 7 days   Lab Units 12/10/19  0525  12/05/19  0505   SODIUM mmol/L 140   < > 138   POTASSIUM mmol/L 3 8   < > 4 2   CHLORIDE mmol/L 107   < > 107   CO2 mmol/L 28   < > 24   BUN mg/dL 41*   < > 32*   CREATININE mg/dL 1 16   < > 1 41*   ANION GAP mmol/L 5   < > 7   CALCIUM mg/dL 8 8   < > 8 0*   ALBUMIN g/dL  --   --  2 7*   TOTAL BILIRUBIN mg/dL  --   --  1 26*   ALK PHOS U/L  --   --  223*   ALT U/L  --   --  23   AST U/L  --   --  36   GLUCOSE RANDOM mg/dL 119   < > 93    < > = values in this interval not displayed  Results from last 7 days   Lab Units 12/09/19  0546   INR  2 02*     Results from last 7 days   Lab Units 12/10/19  1109 12/10/19  0642 12/09/19  2139 12/09/19  1640 12/09/19  1127 12/09/19  0718 12/08/19  2109 12/08/19  1618 12/08/19  1126 12/08/19  0644 12/07/19  2120 12/07/19  1619   POC GLUCOSE mg/dl 183* 122 167* 133 178* 131 195* 137 190* 130 158* 139     Results from last 7 days   Lab Units 12/05/19  0505   HEMOGLOBIN A1C % 6 1     Results from last 7 days   Lab Units 12/06/19  0455 12/05/19  0130 12/04/19  2333   LACTIC ACID mmol/L  --  1 3 2 2*   PROCALCITONIN ng/ml 0 33*  --  0 33*           * I Have Reviewed All Lab Data Listed Above  * Additional Pertinent Lab Tests Reviewed: All Labs Within Last 24 Hours Reviewed    Imaging:    Imaging Reports Reviewed Today Include: None  Imaging Personally Reviewed by Myself Includes:  None    Recent Cultures (last 7 days):     Results from last 7 days   Lab Units 12/05/19  0001 12/04/19  2333   BLOOD CULTURE   --  No Growth After 5 Days    Staphylococcus coagulase negative*   GRAM STAIN RESULT   --  Gram positive cocci in clusters*   URINE CULTURE  >100,000 cfu/ml Escherichia coli*  --        Last 24 Hours Medication List:     Current Facility-Administered Medications:  acetaminophen 650 mg Oral Q6H PRN Hilario Wells MD   acyclovir 400 mg Oral Daily Sandi Castañeda MD   albuterol 2 puff Inhalation Q6H PRN Hilario Wells MD   aluminum-magnesium hydroxide-simethicone 15 mL Oral Q6H PRN Hilario Wells MD   ammonium lactate  Topical Daily Hilario Wells MD   atorvastatin 20 mg Oral Daily With Joaquin Chavarria MD   bisacodyl 5 mg Oral Daily PRN Hilario Wells MD   bisacodyl 10 mg Rectal Daily PRN Hilario Wells MD   cephalexin 500 mg Oral Q6H Chelle Ulloa MD   clotrimazole  Topical BID Hilario Wells MD   fluticasone-vilanterol 1 puff Inhalation Daily Hilario Wells MD   insulin glargine 15 Units Subcutaneous HS Ilene Sibley PA-C   insulin lispro 1-5 Units Subcutaneous HS Hilario Wells MD   insulin lispro 5 Units Subcutaneous TID With Meals Ilene Sibley PA-C   metoprolol tartrate 25 mg Oral Q12H [address] Hilario Wells MD   ondansetron 4 mg Intravenous Q6H PRN Hilario Wells MD   sodium chloride (PF) 3 mL Intravenous PRN Hilairo Wells MD   torsemide 20 mg Oral BID (diuretic) Wang Eldridge PA-C   warfarin 7 5 mg Oral Daily (warfarin) Hilario Wells MD        Today, Patient Was Seen By: Kacey Fischer PA-C    ** Please Note: Dictation voice to text software may have been used in the creation of this document   **

## 2019-12-10 NOTE — PROGRESS NOTES
Progress Note - Infectious Disease   Josephine Contreras 81 y o  male MRN: [id number]  Unit/Bed#: -01 Encounter: [id number]      Impression/Recommendations:  1   Sepsis   POA:  Fever, leukocytosis   Due to # 2   Doubt UTI given lack of  symptoms   Blood cultures, CXR negative   Serial procalcitonin negative for sepsis threshold   Clinically stable and nontoxic   Improved with antibiotics      Rec:  ? Continue antibiotics as below  ? Follow temperatures closely  ? Supportive care as per the primary service     2   Right leg non-purulent cellulitis   In setting of #3   Consider streptococcal infection given well-demarcated erythema   Markedly improved with IV antibiotics  Rec:  ? Continue Keflex through 12/11 to complete 7 days total of antibiotics  ? Edema control as below     3   Chronic lymphedema   With xerosis, venous stasis dermatitis   Risk factor for above  Rec:  ? Continue Lac-Hydrin lotion  ? Leg elevation while in house  ? Needs better outpatient edema control with ACE wrap versus compression stocking     4   Coagulase-negative Staph bacteremia   Single set with late growth likely represents contaminant     Rec:  ? No additional workup or treatment indicated     5   Asymptomatic bacteriuria   Likely represents colonization versus contamination   In absence of symptoms does not represent active infection  Rec:  ? No additional antibiotics indicated     6   Mild DORA on CKD   Baseline Cr 1 2-1 4   Likely due to dehydration in setting of recent diarrhea, decreased PO intake   Improved      7   DM with DPN    A1c 6 1   Risk factor for infection      8   PAD   Status post left AKA     9   MM  On Velcade, Decadron   Missed recent cycle     The patient is stable from an ID standpoint for D/C  We will sign off for now  Please call with new questions      Antibiotics:  Keflex #5  Antibiotics #6    Subjective:  Patient seen on AM rounds  Denies fevers, chills, sweats, nausea, vomiting, or diarrhea    Wants to know Ifeanyi Lewis valentina keep this from coming back on   When I discuss compression stockings he seemed hesitant  24 Hour Events:  No documented fevers, chills, sweats, nausea, vomiting, or diarrhea  Objective:  Vitals:  Temp:  [97 2 °F (36 2 °C)-97 8 °F (36 6 °C)] 97 2 °F (36 2 °C)  HR:  [61-73] 61  Resp:  [18-20] 18  BP: (124-170)/(58-75) 132/58  SpO2:  [94 %-97 %] 97 %  Temp (24hrs), Av 5 °F (36 4 °C), Min:97 2 °F (36 2 °C), Max:97 8 °F (36 6 °C)  Current: Temperature: (!) 97 2 °F (36 2 °C)    Physical Exam:   General:  No acute distress  Psychiatric:  Awake and alert  Pulmonary:  Normal respiratory excursion without accessory muscle use  Abdomen:  Soft, nontender  Extremities:  Wrinkled skin with improved chronic lower extremity edema  Stable faint erythema  Skin:  No rashes    Lab Results:  I have personally reviewed pertinent labs  Results from last 7 days   Lab Units 12/10/19  0525 19  0546 19  0603  19  0505 19  2333   POTASSIUM mmol/L 3 8 4 3 3 9   < > 4 2 4 6   CHLORIDE mmol/L 107 108 108   < > 107 105   CO2 mmol/L 28 26 27   < > 24 27   BUN mg/dL 41* 37* 39*   < > 32* 33*   CREATININE mg/dL 1 16 1 07 1 17   < > 1 41* 1 56*   EGFR ml/min/1 73sq m 65 71 64   < > 51 45   CALCIUM mg/dL 8 8 8 8 8 9   < > 8 0* 8 8   AST U/L  --   --   --   --  36 30   ALT U/L  --   --   --   --  23 25   ALK PHOS U/L  --   --   --   --  223* 259*    < > = values in this interval not displayed  Results from last 7 days   Lab Units 12/10/19  0525 19  0455 19  0505   WBC Thousand/uL 7 49 8 90 13 65*   HEMOGLOBIN g/dL 11 1* 10 9* 10 5*   PLATELETS Thousands/uL 359 210 208     Results from last 7 days   Lab Units 19  0001 19  2333   BLOOD CULTURE   --  No Growth After 5 Days    Staphylococcus coagulase negative*   GRAM STAIN RESULT   --  Gram positive cocci in clusters*   URINE CULTURE  >100,000 cfu/ml Escherichia coli*  --        Imaging Studies:   I have personally reviewed pertinent imaging study reports and images in PACS  EKG, Pathology, and Other Studies:   I have personally reviewed pertinent reports

## 2019-12-10 NOTE — ASSESSMENT & PLAN NOTE
· Patient becomes hypoxic during sleep  · Hx of NALLELY but not on CPAP because he was unable to afford co-pay   · Follows Dr Kenya Murrieta, last seen in august 2019 and sleep study completed in June   · Continue oxygen PRN and qhs while sleeping   · Will recommend NC O2 for sleep qhs and PRN prior to discharge  · Can order O2 when patient is discharged to SNF, will need to be evaluated there, prior to discharge for home O2  · D/w RRT, will order CPAP qhs and trial minimum settings

## 2019-12-10 NOTE — ASSESSMENT & PLAN NOTE
Lab Results   Component Value Date    HGBA1C 6 1 12/05/2019     Recent Labs     12/09/19  1640 12/09/19  2139 12/10/19  0642 12/10/19  1109   POCGLU 133 167* 122 183*     Blood Sugar Average: Last 72 hrs:  (P) 912 [id number]   · Suspect patient on too much insulin at home - states nursing home staff gives him snacks before they give him his insulin  · Home regimen was glargine 18 units at night and 30 units with meals at home  · A1C 6 1 - improved from 9 7 on 12/7/18  · Decreased insulin regimen to 15 units + 5 units AC + ISS HS  · Metformin was on hold while inpt    · Continue to monitor

## 2019-12-10 NOTE — ASSESSMENT & PLAN NOTE
· Known ambulatory dysfunction  · Awaits PT OT, anticipate discharge to rehab, awaiting authorization

## 2019-12-10 NOTE — TELEPHONE ENCOUNTER
I again attempted Arlyn Pinzon and still unable to lm due to Full mailbox   Pt has a upcoming OV and this can be addressed then

## 2019-12-11 VITALS
HEIGHT: 75 IN | WEIGHT: 315 LBS | RESPIRATION RATE: 18 BRPM | TEMPERATURE: 98 F | OXYGEN SATURATION: 95 % | HEART RATE: 59 BPM | SYSTOLIC BLOOD PRESSURE: 151 MMHG | DIASTOLIC BLOOD PRESSURE: 73 MMHG | BODY MASS INDEX: 39.17 KG/M2

## 2019-12-11 PROBLEM — R21 RASH: Status: ACTIVE | Noted: 2019-12-11

## 2019-12-11 LAB
GLUCOSE SERPL-MCNC: 129 MG/DL (ref 65–140)
GLUCOSE SERPL-MCNC: 130 MG/DL (ref 65–140)
GLUCOSE SERPL-MCNC: 177 MG/DL (ref 65–140)

## 2019-12-11 PROCEDURE — 97535 SELF CARE MNGMENT TRAINING: CPT

## 2019-12-11 PROCEDURE — 97110 THERAPEUTIC EXERCISES: CPT

## 2019-12-11 PROCEDURE — 99239 HOSP IP/OBS DSCHRG MGMT >30: CPT | Performed by: PHYSICIAN ASSISTANT

## 2019-12-11 PROCEDURE — 97530 THERAPEUTIC ACTIVITIES: CPT

## 2019-12-11 PROCEDURE — 82948 REAGENT STRIP/BLOOD GLUCOSE: CPT

## 2019-12-11 RX ORDER — TRIAMCINOLONE ACETONIDE 1 MG/G
CREAM TOPICAL 2 TIMES DAILY
Status: DISCONTINUED | OUTPATIENT
Start: 2019-12-11 | End: 2019-12-11 | Stop reason: HOSPADM

## 2019-12-11 RX ORDER — TRIAMCINOLONE ACETONIDE 1 MG/G
CREAM TOPICAL 2 TIMES DAILY
Qty: 30 G | Refills: 0 | Status: SHIPPED | OUTPATIENT
Start: 2019-12-11 | End: 2020-06-02

## 2019-12-11 RX ORDER — CEPHALEXIN 500 MG/1
500 CAPSULE ORAL EVERY 6 HOURS SCHEDULED
Qty: 2 CAPSULE | Refills: 0 | Status: SHIPPED | OUTPATIENT
Start: 2019-12-11 | End: 2019-12-12

## 2019-12-11 RX ORDER — INSULIN GLARGINE 100 [IU]/ML
15 INJECTION, SOLUTION SUBCUTANEOUS
Refills: 0
Start: 2019-12-11 | End: 2020-01-22 | Stop reason: CLARIF

## 2019-12-11 RX ADMIN — INSULIN LISPRO 5 UNITS: 100 INJECTION, SOLUTION INTRAVENOUS; SUBCUTANEOUS at 11:46

## 2019-12-11 RX ADMIN — TORSEMIDE 20 MG: 20 TABLET ORAL at 08:55

## 2019-12-11 RX ADMIN — CEPHALEXIN 500 MG: 500 CAPSULE ORAL at 06:08

## 2019-12-11 RX ADMIN — Medication: at 08:55

## 2019-12-11 RX ADMIN — FLUTICASONE FUROATE AND VILANTEROL TRIFENATATE 1 PUFF: 200; 25 POWDER RESPIRATORY (INHALATION) at 08:55

## 2019-12-11 RX ADMIN — CEPHALEXIN 500 MG: 500 CAPSULE ORAL at 11:46

## 2019-12-11 RX ADMIN — INSULIN LISPRO 5 UNITS: 100 INJECTION, SOLUTION INTRAVENOUS; SUBCUTANEOUS at 08:55

## 2019-12-11 RX ADMIN — CLOTRIMAZOLE: 10 CREAM TOPICAL at 08:55

## 2019-12-11 RX ADMIN — TRIAMCINOLONE ACETONIDE: 1 CREAM TOPICAL at 13:50

## 2019-12-11 RX ADMIN — ACYCLOVIR 400 MG: 200 CAPSULE ORAL at 08:55

## 2019-12-11 RX ADMIN — METOPROLOL TARTRATE 25 MG: 25 TABLET, FILM COATED ORAL at 08:55

## 2019-12-11 NOTE — PROGRESS NOTES
Report called and given to Allie Zimmerman at [facility]  Awaiting transportation

## 2019-12-11 NOTE — PLAN OF CARE
Problem: OCCUPATIONAL THERAPY ADULT  Goal: Performs self-care activities at highest level of function for planned discharge setting  See evaluation for individualized goals  Description  Treatment Interventions: ADL retraining, Functional transfer training, Endurance training, Cognitive reorientation, Patient/family training, Equipment evaluation/education, Compensatory technique education, Energy conservation, Activityengagement, Continued evaluation          See flowsheet documentation for full assessment, interventions and recommendations  Outcome: Progressing  Note:   Limitation: Decreased ADL status, Decreased Safe judgement during ADL, Decreased cognition, Decreased endurance, Decreased self-care trans, Decreased high-level ADLs  Prognosis: Fair  Assessment: Pt participated in occupational therapy with focus on activity tolerance, bed mob, unsupported sitting balance and tolerance for pt engagement in functional self-care task/oral care and UB and LB  Pt cleared by RN/Tatiana for pt participation in therapy  Pt received sitting out of bed to edge of pt bed and agreeable to therapy following pt Identifiers confirmed  Pt reported his goal to get stronger again  Pt requires assist x 2 for functional transfer however was unable to tolerate stance to get to bedside chair  Pt required min A for LB dressing/donning sock to R LE 2* pt balance and dynamic reach  Pt was noted to scratch his R LE leg with IV site from his R UE/pt nurse informed and involved  Pt will require in-pt rehab to continue to address pt noted deficits with decreased strength, coordination and balance which currently impair pt ADL and functional mob  Pt required smooth  to bedside chair /chair alarm active post session all needs within reach        OT Discharge Recommendation: Short Term Rehab  OT - OK to Discharge: Yes(When medically appropriate)

## 2019-12-11 NOTE — ASSESSMENT & PLAN NOTE
Wt Readings from Last 3 Encounters:   12/11/19 (!) 152 kg (334 lb 3 5 oz)   11/05/19 (!) 159 kg (349 lb 14 4 oz)   10/20/19 (!) 159 kg (350 lb 8 5 oz)     · Hx of diastolic HF  · Echo 4/0/26 - EF 60% w/ grade 3 diastolic dysfunction   · Daily weights, I/Os  · Continue lopressor BID   · Torsemide restarted, increased to 20mg BID

## 2019-12-11 NOTE — PLAN OF CARE
Problem: Potential for Falls  Goal: Patient will remain free of falls  Description  INTERVENTIONS:  - Assess patient frequently for physical needs  -  Identify cognitive and physical deficits and behaviors that affect risk of falls    -  [place] fall precautions as indicated by assessment   - Educate patient/family on patient safety including physical limitations  - Instruct patient to call for assistance with activity based on assessment  - Modify environment to reduce risk of injury  - Consider OT/PT consult to assist with strengthening/mobility  Outcome: Progressing

## 2019-12-11 NOTE — PHYSICAL THERAPY NOTE
Physical Therapy Progress Note     12/11/19 1035   Pain Assessment   Pain Assessment 0-10   Pain Score 8  (upon weight bearing, no pain at rest)   Pain Location Knee   Pain Orientation Right   Restrictions/Precautions   RLE Weight Bearing Per Order WBAT   Other Precautions Contact/isolation; Chair Alarm; Fall Risk;Pain   General   Family/Caregiver Present No   Cognition   Overall Cognitive Status WFL   Arousal/Participation Alert; Cooperative   Transfers   Sit to Stand 2  Maximal assistance   Additional items Assist x 2;Verbal cues   Stand to Sit 2  Maximal assistance   Additional items Assist x 2   Balance   Static Sitting Fair +   Static Standing Poor -   Ambulatory Zero   Endurance Deficit   Endurance Deficit Yes   Endurance Deficit Description knee pain, Greg Armendariz RN aware   Activity Tolerance   Activity Tolerance Patient limited by pain   Nurse [address] to see per MARKIE Armendariz   Exercises   Hip Flexion Sitting;10 reps;AROM; Right   Knee AROM Long Arc Quad Sitting;10 reps;AROM; Right   Ankle Pumps Sitting;10 reps;AROM; Right   Assessment   Prognosis Fair   Problem List Decreased strength;Decreased endurance; Impaired balance;Decreased mobility; Decreased coordination;Pain;Orthopedic restrictions; Obesity   Assessment Pt is making slow progress with transfers  Limited by pain to R knee upon weight bearing  Greg Armendariz RN aware  Additional time required today due to multiple attempts with transfers  Attempted sit to stand transfers bedside with max assist x 2 trials with rolling walker however pt reports "pain with R knee "  Trialed sit pivot transfer as well however due to weakness, pt unable  Pt was transferred via smooth  in supine with PCA present, bed to recliner  Chair alarm active post session  Pt would benefit from continued physical therapy to maximize functional independence  Goals   Patient Goals Get stronger   STG Expiration Date 12/23/19   Short Term Goal #1 1   Pt will demonstrate ability to perform all aspects of bed mobility with supervision A in order to increase independence and decrease burden on caregivers  2  Pt will demonstrate ability to perform functional transfers with supervision A in order to increase independence and decrease burden on caregivers  3  Pt will demonstrate ability to perform WC mobility 200 ft with supervision A in order to return to mobility safely  4  Pt will demonstrate improved balance by one grade order to decrease risk of falls  5  Pt will increase b/l LE strength by 1 grade in order to increase ease of functional mobility and transfers  PT Treatment Day 1   Plan   Treatment/Interventions Functional transfer training;LE strengthening/ROM; Therapeutic exercise; Endurance training;Patient/family training;Bed mobility;Spoke to nursing   Progress Slow progress, decreased activity tolerance   PT Frequency   (3-5x/week)   Recommendation   Recommendation Post acute IP rehab   Equipment Recommended Wheelchair     Rosalina Baird, MARGIE

## 2019-12-11 NOTE — RESTORATIVE TECHNICIAN NOTE
Restorative Specialist Mobility Note       Activity: Other (Comment)(Educated/encouraged pt to get In/OOB with assistance  Assist x3 back to bed from the chair via lateral transfer using smooth   Bed alarm on   Pt callbell, phone/tray within reach )     Assistive Device: Other (Comment)(Assist x3)       Bk SOTO, Restorative Technician, [employer] [facility]

## 2019-12-11 NOTE — OCCUPATIONAL THERAPY NOTE
Occupational Therapy Treatment Note       12/11/19 1010   Restrictions/Precautions   Weight Bearing Precautions Per Order Yes   RLE Weight Bearing Per Order WBAT   Braces or Orthoses   (none)   Other Precautions Contact/isolation; Chair Alarm; Fall Risk;Pain   Lifestyle   Autonomy Pt reports that PTA at [facility] he receives assistance with ADLS and IADLS, and is able to transfers to and from w/c with RW independently    [place] [address]  staff assist with ADLS/ IADLS as needed   Service to Others Retired   Intrinsic Gratification Watching TV   Pain Assessment   Pain Assessment 0-10   Pain Score 8   ADL   Where Assessed Edge of bed   Grooming Assistance 5  Supervision/Setup   Grooming Deficit Setup;Supervision/safety   UB Bathing Assistance 5  Supervision/Setup   UB Bathing Deficit Setup;Supervision/safety   LB Bathing Assistance 3  Moderate Assistance   LB Bathing Deficit Buttocks;Right lower leg including foot; Left lower leg including foot   UB Dressing Assistance 3  Moderate Assistance   UB Dressing Deficit Thread RUE; Thread LUE;Pull around back   LB Dressing Assistance 3  Moderate Assistance   LB Dressing Deficit Don/doff R sock; Don/doff R shoe   Transfers   Sit to Stand 2  Maximal assistance   Additional items Assist x 2   Stand to Sit 2  Maximal assistance   Additional items Assist x 2   Cognition   Overall Cognitive Status [facility]   Arousal/Participation Alert; Cooperative   Attention Attends with cues to redirect   Orientation Level Oriented X4   Memory Decreased recall of precautions   Following Commands Follows one step commands with increased time or repetition   Assessment   Assessment Pt participated in occupational therapy with focus on activity tolerance, bed mob, unsupported sitting balance and tolerance for pt engagement in functional self-care task/oral care and UB and LB  Pt cleared by MARKIE/Tatiana for pt participation in therapy    Pt received sitting out of bed to edge of pt bed and agreeable to therapy following pt Identifiers confirmed  Pt reported his goal to get stronger again  Pt requires assist x 2 for functional transfer however was unable to tolerate stance to get to bedside chair  Pt required min A for LB dressing/donning sock to R LE 2* pt balance and dynamic reach  Pt was noted to scratch his R LE leg with IV site from his R UE/pt nurse informed and involved  Pt will require in-pt rehab to continue to address pt noted deficits with decreased strength, coordination and balance which currently impair pt ADL and functional mob  Pt required smooth  to bedside chair /chair alarm active post session all needs within reach      Plan   Treatment Interventions ADL retraining   Goal Expiration Date 12/19/19   OT Treatment Day 1   OT Frequency 3-5x/wk   Recommendation   OT Discharge Recommendation Short Term Rehab   Barthel Index   Feeding 10   Bathing 0   Grooming Score 0   Dressing Score 5   Bladder Score 10   Bowels Score 5   Toilet Use Score 5   Transfers (Bed/Chair) Score 5   Mobility (Level Surface) Score 0   Stairs Score 0   Barthel Index Score 40   Modified [place] Scale   Modified Judy Scale 4     Alexis Brooks  GALLEGO/L

## 2019-12-11 NOTE — DISCHARGE SUMMARY
[facility]  Discharge- Sherri Shaper 1950, 81 y o  male MRN: [id number]  Unit/Bed#: -01 Encounter: [id number]  Primary Care Provider:  Staci Jamil MD   Date and time admitted to hospital: 12/4/2019 10:34 PM    Lymphedema  Assessment & Plan  · Improving w/ elevation      Chronic diastolic (congestive) heart failure (HCC)  Assessment & Plan  Wt Readings from Last 3 Encounters:   12/11/19 (!) 152 kg (334 lb 3 5 oz)   11/05/19 (!) 159 kg (349 lb 14 4 oz)   10/20/19 (!) 159 kg (350 lb 8 5 oz)     · Hx of diastolic HF  · Echo 4/8/80 - EF 60% w/ grade 3 diastolic dysfunction   · Daily weights, I/Os  · Continue lopressor BID   · Torsemide restarted, increased to 20mg BID      CKD (chronic kidney disease)  Assessment & Plan  · Hx of CKD stage 2-3 per last nephro note  · Concern of DORA - Cr 1 5 on admission  · Improved to 1 16 now  · Baseline appears to be 1 2-1 4  · Monitor with increased dose of demadex     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)  Assessment & Plan  · Known ambulatory dysfunction  · For Discharge to [facility]    Multiple myeloma [facility])  Assessment & Plan  · Pt not in remission   · Received chemotherapy - Velcade and decadron cycle 1/6   · Prophylactic acyclovir  · Also received venofer for associated anemia     NALLELY (obstructive sleep apnea)  Assessment & Plan  · Patient becomes hypoxic during sleep  · Hx of NALLELY but not on CPAP because he was unable to afford co-pay   · Follows Dr Sofía Washington, last seen in august 2019 and sleep study completed in June   · Continue oxygen PRN and qhs while sleeping   · Will recommend NC O2 for sleep qhs and PRN prior to discharge  · Can order O2 when patient is discharged to SNF, will need to be evaluated there, prior to discharge for home O2  · D/w RRT, will order CPAP qhs and trial minimum settings  · Current settings: 8, 40% FiO2    Hyperlipidemia  Assessment & Plan  · Continue Lipitor    Chronic atrial fibrillation  Assessment & Plan  · On warfarin 10 mg daily typically  · Monitor INR    · Continue metoprolol  Essential hypertension  Assessment & Plan  · Resumed Demadex w/ improved Cr  · Continue metoprolol  · Monitor BP     Diabetes mellitus type 2, uncontrolled [facility])  Assessment & Plan  Lab Results   Component Value Date    HGBA1C 6 1 12/05/2019     Recent Labs     12/10/19  1109 12/10/19  1609 12/10/19  2055 12/11/19  0611   POCGLU 183* 170* 192* 129     Blood Sugar Average: Last 72 hrs:  (P) 923 [id number]   · Suspect patient on too much insulin at home - states nursing home staff gives him snacks before they give him his insulin  · Home regimen was glargine 18 units at night and 30 units with meals at home  · A1C 6 1 - improved from 9 7 on 12/7/18  · Decreased insulin regimen to 15 units + 5 units AC + ISS HS  · Metformin was on hold while inpt  · Continue to monitor     * Sepsis due to cellulitis [facility])  Assessment & Plan  · Pt w/ hx of DM, obesity, PVD, AKA L side, and multiple myeloma currently receiving chemo admitted 12/5 for fevers, weakness, and worsening redness of R lower leg on chronic skin changes  Febrile and WBC on admission     · Off of IV abx - now cephalexin until 12/11 per ID   · Infectious disease consult appreciated   · 1 out of 2 Bl cx positive for GPC, suspect contaminant given late growth of staph coag neg   · No sign of osteo on XR  · Encourage LE elevation, compression, aggressive edema control   · Recommend compression stocking or ACE wrap outpatient  · Continue to monitor -improving in appearance  · Podiatry referral upon discharge      Discharging Physician / Practitioner: Aruna Ramos PA-C  PCP: Gricel Najera MD  Admission Date:   Admission Orders (From admission, onward)     Ordered        12/05/19 0119  Inpatient Admission  Once                   Discharge Date: 12/11/19    Resolved Problems  Date Reviewed: 12/11/2019    None          Consultations During [facility] Stay:  · Infectious Disease  · Podiatry    Procedures Performed:   · None    Significant Findings / Test Results:   · CXR 12/4:  Question left base opacity  This is a real finding, could be due to atelectasis or pneumonia  · X-ray tibia-fibula 12/5:  No acute osseous abnormality  Degenerative changes  Incidental Findings:   · None     Test Results Pending at Discharge (will require follow up): · None     Outpatient Tests Requested:  · Patient Podiatry follow-up  · Routine INR monitoring    Complications:  Patient    Reason for Admission: Fever, met sepsis criteria    Hospital Course:     Shahab Taveras is a 81 y o  male patient with past medical history of DM to on insulin, hyperlipidemia, chronic AFib, CHF, hypertension who originally presented to the hospital on 12/4/2019 due to fever  Patient sepsis criteria admission with fever and leukocytosis  Blood cultures were taken and patient started on IV antibiotics  ID was consulted  Blood cultures and CXR were negative  Serial procalcitonin was negative  Patient did improve with antibiotics  Right leg revealed non purulent cellulitis in the setting of chronic lymphedema  With marked improvement with IV antibiotics, patient was transitioned to Keflex for 7 days total antibiotics through 12/11  Recommend Ace wrap versus compression stocking upon discharge  Patient also positive for asymptomatic bacteriuria and is likely colonized  No active infection requiring antibiotics at present  Patient is weight-bearing as tolerated to RLE  No open wounds requiring wound care ongoing  Patient should have outpatient follow-up with Podiatry  Additionally, while patient admitted resume CPAP  Respiratory did evaluate patient and settings are 8 with 40% FiO2  This should be continued upon discharge  Please see above list of diagnoses and related plan for additional information       Condition at Discharge: stable     Discharge Day Visit / Exam: Subjective:  Patient reports he feels well  Denies pain in his lower extremity  No difficulty eating, drinking  Denies fever or chills  He is sleeping better with the CPAP  Agreeable to outpatient follow-up with Podiatry  Vitals: Blood Pressure: 151/73 (12/11/19 0733)  Pulse: 59 (12/11/19 0733)  Temperature: 98 °F (36 7 °C) (12/11/19 0733)  Temp Source: Oral (12/07/19 2220)  Respirations: 18 (12/11/19 0733)  Height: 6' 3" (190 5 cm) (12/05/19 1453)  Weight - Scale: (!) 152 kg (334 lb 3 5 oz) (12/11/19 0600)  SpO2: 95 % (12/11/19 0733)  Exam:   Physical Exam   Constitutional: He appears well-developed and well-nourished  No distress  HENT:   Head: Normocephalic and atraumatic  Eyes: Conjunctivae are normal  No scleral icterus  Cardiovascular: Normal rate  An irregularly irregular rhythm present  No murmur heard  Pulmonary/Chest: Effort normal and breath sounds normal  No respiratory distress  He has no wheezes  He has no rales  Abdominal: Soft  He exhibits no distension  There is no tenderness  Musculoskeletal: He exhibits no edema  S/P LLE amputation   Neurological: He is alert  Skin: Skin is warm and dry  No erythema  Chronic vascular changes of right lower extremity, discoloration but no erythema indicative of active cellulitis infection  Psychiatric: He has a normal mood and affect  Nursing note and vitals reviewed  Discussion with Family:  Discussed discharge plan with patient at bedside  Answered all questions to the best of my ability  Discharge instructions/Information to patient and family:   See after visit summary for information provided to patient and family  Provisions for Follow-Up Care:  See after visit summary for information related to follow-up care and any pertinent home health orders        Disposition:     Other [place] at [employer]    For Discharges to [facility] SNF:   · Not Applicable to this Patient - Not Applicable to this Patient    Planned Readmission:  None     Discharge Statement:  I spent 65 minutes discharging the patient  This time was spent on the day of discharge  I had direct contact with the patient on the day of discharge  Greater than 50% of the total time was spent examining patient, answering all patient questions, arranging and discussing plan of care with patient as well as directly providing post-discharge instructions  Additional time then spent on discharge activities  Discharge Medications:  See after visit summary for reconciled discharge medications provided to patient and family        ** Please Note: This note has been constructed using a voice recognition system **

## 2019-12-11 NOTE — SOCIAL WORK
[facility] confirmed authorization for placement  Italo Sage #[id number], update requested in 3 days, ; Alexi Dasilva FAX # [phone]  CM contacted [facility] [place]  Pt's only in network with [facility]  Auth #[id number]  BLS 4:30PM to [facility]  R: [phone]  F: [phone]    KATY submitted CMN for medical records

## 2019-12-11 NOTE — PHYSICAL THERAPY NOTE
Physical Therapy Progress Note     12/11/19 1527   Pain Assessment   Pain Assessment 0-10   Pain Score 6   Pain Location Knee   Pain Orientation Right   Restrictions/Precautions   RLE Weight Bearing Per Order WBAT   Other Precautions Contact/isolation; Fall Risk;Pain   General   Family/Caregiver Present No   Cognition   Overall Cognitive Status WFL   Arousal/Participation Alert; Cooperative   Bed Mobility   Sit to Supine 3  Moderate assistance   Additional items Assist x 2;LE management   Assessment   Prognosis Fair   Problem List Decreased strength;Decreased endurance; Impaired balance;Decreased mobility; Decreased coordination;Obesity;Pain   Assessment Pt was seen for additional visit to assist nursing staff with transfer back to bed  Pt transferred supine from recliner to bed via smooth   PCA and restorative tech present to assist with transfer  Pt would benefit from continued physical therapy to maximize functional independence  Goals   Patient Goals Get better   STG Expiration Date 12/23/19   PT Treatment Day 2   Plan   Treatment/Interventions Functional transfer training;LE strengthening/ROM; Therapeutic exercise; Endurance training;Patient/family training;Bed mobility;Spoke to nursing   Progress Slow progress, decreased activity tolerance   Recommendation   Recommendation Post acute IP rehab   Equipment Recommended Wheelchair     Jaclyn Bacon PTA

## 2019-12-11 NOTE — TRANSPORTATION MEDICAL NECESSITY
Section I - General Information    Name of Patient: Renu Gregory                 : 1950    Medicare #: [id number]  Transport Date: 19 (PCS is valid for round trips on this date and for all repetitive trips in the 60-day range as noted below )  [facility] [address] [facility] [address]                                                         Destination: [facility]   Is the pt's stay covered under Medicare Part A (PPS/DRG)   []     Closest appropriate facility? If no, why is transport to more distant facility required? Yes  If hospice pt, is this transport related to pt's terminal illness? No       Section II - Medical Necessity Questionnaire  Ambulance transportation is medically necessary only if other means of transport are contraindicated or would be potentially harmful to the patient  To meet this requirement, the patient must either be "bed confined" or suffer from a condition such that transport by means other than ambulance is contraindicated by the patient's condition  The following questions must be answered by the medical professional signing below for this form to be valid:    1)  Describe the MEDICAL CONDITION (physical and/or mental) of this patient AT [address] [place] [address] that requires the patient to be transported in an ambulance and why transport by other means is contraindicated by the patient's condition: Diabetic foot ulcer, above the knee amputation of lower left extremity, weakness, max assistance for transfer     2) Is the patient "bed confined" as defined below? Yes  To be "be confined" the patient must satisfy all three of the following conditions: (1) unable to get up from bed without Assistance; AND (2) unable to ambulate; AND (3) unable to sit in a chair or wheelchair  3) Can this patient safely be transported by car or wheelchair van (i e , seated during transport without a medical attendant or monitoring)?    No    4) In addition to completing questions 1-3 above, please check any of the following conditions that apply*:   *Note: supporting documentation for any boxes checked must be maintained in the patient's medical records  If hosp-hosp transfer, describe services needed at 2nd facility not available at 1st facility? Moderate/severe pain on movement   Special handling/isolation/infection control precautions required   Unable to tolerate seated position for time needed to transport       Section III - Signature of Physician or Healthcare Professional  I certify that the above information is true and correct based on my evaluation of this patient, and represent that the patient requires transport by ambulance and that other forms of transport are contraindicated  I understand that this information will be used by the Centers for Medicare and Medicaid Services (CMS) to support the determination of medical necessity for ambulance services, and I represent that I have personal knowledge of the patient's condition at time of transport  []  If this box is checked, I also certify that the patient is physically or mentally incapable of signing the ambulance service's claim and that the institution with which I am affiliated has furnished care, services, or assistance to the patient  My signature below is made on behalf of the patient pursuant to 42 CFR §424 36(b)(4)   In accordance with 42 CFR §424 37, the specific reason(s) that the patient is physically or mentally incapable of signing the claim form is as follows:       Signature of Physician* or Healthcare Professional____________________________    ELIAS Howell __________________________________  Signature Date 12/11/19 (For scheduled repetitive transports, this form is not valid for transports performed more than 60 days after this date)    Printed Name & Credentials of Physician or Healthcare Professional (MD, DO, RN, etc )________________________________  *Form must be signed by patient's attending physician for scheduled, repetitive transports   For non-repetitive, unscheduled ambulance transports, if unable to obtain the signature of the attending physician, any of the following may sign (choose appropriate option below)  [] Physician Assistant []  Clinical Nurse Specialist []  Registered Nurse  []  Nurse Practitioner  [x] Discharge Planner

## 2019-12-11 NOTE — ASSESSMENT & PLAN NOTE
· Patient becomes hypoxic during sleep  · Hx of NALLELY but not on CPAP because he was unable to afford co-pay   · Follows Dr Rinku Moura, last seen in august 2019 and sleep study completed in June   · Continue oxygen PRN and qhs while sleeping   · Will recommend NC O2 for sleep qhs and PRN prior to discharge  · Can order O2 when patient is discharged to SNF, will need to be evaluated there, prior to discharge for home O2  · D/w RRT, will order CPAP qhs and trial minimum settings  · Current settings: 8, 40% FiO2

## 2019-12-11 NOTE — ASSESSMENT & PLAN NOTE
Lab Results   Component Value Date    HGBA1C 6 1 12/05/2019     Recent Labs     12/10/19  1109 12/10/19  1609 12/10/19  2055 12/11/19  0611   POCGLU 183* 170* 192* 129     Blood Sugar Average: Last 72 hrs:  (P) 519 [id number]   · Suspect patient on too much insulin at home - states nursing home staff gives him snacks before they give him his insulin  · Home regimen was glargine 18 units at night and 30 units with meals at home  · A1C 6 1 - improved from 9 7 on 12/7/18  · Decreased insulin regimen to 15 units + 5 units AC + ISS HS  · Metformin was on hold while inpt    · Continue to monitor

## 2019-12-11 NOTE — PLAN OF CARE
Problem: PHYSICAL THERAPY ADULT  Goal: Performs mobility at highest level of function for planned discharge setting  See evaluation for individualized goals  Description  Treatment/Interventions: Functional transfer training, LE strengthening/ROM, Therapeutic exercise, Endurance training, Patient/family training, Equipment eval/education, Bed mobility, Gait training, Spoke to nursing          See flowsheet documentation for full assessment, interventions and recommendations  Outcome: Progressing  Note:   Prognosis: Fair  Problem List: Decreased strength, Decreased endurance, Impaired balance, Decreased mobility, Decreased coordination, Pain, Orthopedic restrictions, Obesity  Assessment: Pt is making slow progress with transfers  Limited by pain to R knee upon weight bearing  Jennifer Contreras RN aware  Additional time required today due to multiple attempts with transfers  Attempted sit to stand transfers bedside with max assist x 2 trials with rolling walker however pt reports "pain with R knee "  Trialed sit pivot transfer as well however due to weakness, pt unable  Pt was transferred via smooth  in supine with PCA present, bed to recliner  Chair alarm active post session  Pt would benefit from continued physical therapy to maximize functional independence  Recommendation: Post acute IP rehab     PT - OK to Discharge: Yes(to rehab once medically cleared)    See flowsheet documentation for full assessment

## 2019-12-12 NOTE — UTILIZATION REVIEW
Notification of Discharge  This is a Notification of Discharge from our facility [address]  Please be advised that this patient has been discharge from our facility  Below you will find the admission and discharge date and time including the patients disposition  PRESENTATION DATE: 12/4/2019 10:34 PM    IP ADMISSION DATE: 12/5/19 0112   DISCHARGE DATE: 12/11/2019  5:44 PM  DISPOSITION: Non [facility] Non [facility]   Admission Orders listed below:  Admission Orders (From admission, onward)     Ordered        12/05/19 0119  Inpatient Admission  Once                   Please contact the UR Department if additional information is required to close this patient's authorization/case  Krissy Murdock  Network Utilization Review Department  Main: [phone] x carefully listen to the prompts  All voicemails are confidential   [email or web address]  Send all requests for admission clinical reviews, approved or denied determinations and any other requests to dedicated fax number below belonging to the campus where the patient is receiving treatment   List of dedicated fax numbers:  [address] DENIALS (Administrative/Medical Necessity) [phone]   [address] [place] [address] (Maternity/NICU/Pediatrics) [phone]   [facility] [phone]   [facility] [phone]   Kevin Correa [phone]   [address] [place] [address] [phone]   [facility] [phone]   [facility]  [phone]   Carlos Fatima [address] [place] [address] 1000 [facility] [phone]

## 2019-12-13 ENCOUNTER — TELEPHONE (OUTPATIENT)
Dept: HEMATOLOGY ONCOLOGY | Facility: CLINIC | Age: 69
End: 2019-12-13

## 2019-12-13 NOTE — TELEPHONE ENCOUNTER
Kim Jo called regarding Jason Mott is in a nursing home in [address] [place] [address] Daisy called [facility]  Pt would like for his nursing home to either do his tx's or wait until he gets home to start his tx   Please review and give the pt a call back [phone]

## 2019-12-13 NOTE — TELEPHONE ENCOUNTER
Patients daughter in law called to see if you could give her a call back on patients condition and treatment plan as she worries the infection in his leg keeps coming back and she would like to know what else there is your team could do she would like a call back

## 2019-12-16 ENCOUNTER — TELEPHONE (OUTPATIENT)
Dept: HEMATOLOGY ONCOLOGY | Facility: CLINIC | Age: 69
End: 2019-12-16

## 2019-12-16 ENCOUNTER — HOSPITAL ENCOUNTER (OUTPATIENT)
Dept: INFUSION CENTER | Facility: HOSPITAL | Age: 69
Discharge: HOME | End: 2019-12-16
Attending: INTERNAL MEDICINE

## 2019-12-16 ENCOUNTER — TELEPHONE (OUTPATIENT)
Dept: FAMILY MEDICINE CLINIC | Facility: CLINIC | Age: 69
End: 2019-12-16

## 2019-12-16 DIAGNOSIS — S78.112A ABOVE KNEE AMPUTATION OF LEFT LOWER EXTREMITY (HCC): Chronic | ICD-10-CM

## 2019-12-16 DIAGNOSIS — I89.0 LYMPHEDEMA: ICD-10-CM

## 2019-12-16 DIAGNOSIS — E11.42 DIABETIC POLYNEUROPATHY ASSOCIATED WITH TYPE 2 DIABETES MELLITUS (HCC): Primary | ICD-10-CM

## 2019-12-16 NOTE — TELEPHONE ENCOUNTER
PT PLEASE NEEDS REFERRAL FOR PHYSICAL THERAPY TO BE DONE AT [facility] IN [place]  HE HAS [employer] INSURANCE AND NEEDS SOMEONE IN THE NETWORK ACCORDING TO [employer]  [employer] STATES THAT WE NEED TO ISSUE THE REFERRAL  [facility] DOES NOT TAKE [employer]  ALSO PT NEEDS REFERRAL FOR DIABETIC SHOES - PLEASE ISSUE FOR [facility] WHICH WILL COVER HIS SHOES OR "[employer] WHICH WILL ALSO COVER THE SHOES  [facility] ADDRESS IS  [address], [address], Pauline Garvey [address] [place] [address] [address], Eliel Contreras, [address]  THANK YOU

## 2019-12-16 NOTE — TELEPHONE ENCOUNTER
Gal Coronel called to cancel today's transfusion appt  Patient did not want to take transportation from living home

## 2019-12-23 ENCOUNTER — HOSPITAL ENCOUNTER (OUTPATIENT)
Dept: INFUSION CENTER | Facility: HOSPITAL | Age: 69
Discharge: HOME | End: 2019-12-23
Attending: INTERNAL MEDICINE

## 2019-12-23 ENCOUNTER — TELEPHONE (OUTPATIENT)
Dept: HEMATOLOGY ONCOLOGY | Facility: CLINIC | Age: 69
End: 2019-12-23

## 2019-12-23 NOTE — TELEPHONE ENCOUNTER
Spoke with Eveline Perry patients significant other and patient is still at [facility] probably another week  Per Amy Padilla he will not need another office visit as he was just seen 2 wks ago  He can resume velcade once he is discharged, she will call the office once he is back home   Eveline Perry voiced understanding

## 2019-12-23 NOTE — TELEPHONE ENCOUNTER
Left a message with patient's signiicant other to ask if patient was at home as there was a message from patient stating he did not want to have treatment resumed until he was at his house and not while in [facility]

## 2019-12-30 ENCOUNTER — HOSPITAL ENCOUNTER (OUTPATIENT)
Dept: INFUSION CENTER | Facility: HOSPITAL | Age: 69
Discharge: HOME | End: 2019-12-30
Attending: INTERNAL MEDICINE

## 2019-12-31 ENCOUNTER — TELEPHONE (OUTPATIENT)
Dept: HEMATOLOGY ONCOLOGY | Facility: CLINIC | Age: 69
End: 2019-12-31

## 2019-12-31 NOTE — TELEPHONE ENCOUNTER
Called and left message with patients son ok to continue with treatment on 1/6  Discussed with Patricia Oneill

## 2019-12-31 NOTE — TELEPHONE ENCOUNTER
Patients son called and said he was cleared for chemo again, but didn't know if should continue without seeing Dr Donald Singh first  His next treatment was scheduled for Jan 6   He does not see [facility] until Jan 27

## 2020-01-03 DIAGNOSIS — C90.00 MULTIPLE MYELOMA NOT HAVING ACHIEVED REMISSION (HCC): ICD-10-CM

## 2020-01-06 ENCOUNTER — DOCUMENTATION (OUTPATIENT)
Dept: FAMILY MEDICINE CLINIC | Facility: CLINIC | Age: 70
End: 2020-01-06

## 2020-01-06 ENCOUNTER — TELEPHONE (OUTPATIENT)
Dept: OTHER | Facility: OTHER | Age: 70
End: 2020-01-06

## 2020-01-06 ENCOUNTER — HOSPITAL ENCOUNTER (OUTPATIENT)
Dept: INFUSION CENTER | Facility: HOSPITAL | Age: 70
Discharge: HOME | End: 2020-01-06
Attending: INTERNAL MEDICINE
Payer: COMMERCIAL

## 2020-01-06 VITALS
HEIGHT: 75 IN | WEIGHT: 315 LBS | RESPIRATION RATE: 16 BRPM | TEMPERATURE: 97.6 F | BODY MASS INDEX: 39.17 KG/M2 | DIASTOLIC BLOOD PRESSURE: 72 MMHG | OXYGEN SATURATION: 95 % | HEART RATE: 73 BPM | SYSTOLIC BLOOD PRESSURE: 113 MMHG

## 2020-01-06 DIAGNOSIS — C90.00 MULTIPLE MYELOMA NOT HAVING ACHIEVED REMISSION (HCC): Primary | ICD-10-CM

## 2020-01-06 LAB
ALBUMIN SERPL BCP-MCNC: 3.3 G/DL (ref 3.5–5)
ALP SERPL-CCNC: 186 U/L (ref 46–116)
ALT SERPL W P-5'-P-CCNC: 21 U/L (ref 12–78)
ANION GAP SERPL CALCULATED.3IONS-SCNC: 8 MMOL/L (ref 4–13)
AST SERPL W P-5'-P-CCNC: 17 U/L (ref 5–45)
BASOPHILS # BLD AUTO: 0.07 THOUSANDS/ΜL (ref 0–0.1)
BASOPHILS NFR BLD AUTO: 1 % (ref 0–1)
BILIRUB SERPL-MCNC: 0.5 MG/DL (ref 0.2–1)
BUN SERPL-MCNC: 42 MG/DL (ref 5–25)
CALCIUM SERPL-MCNC: 9 MG/DL (ref 8.3–10.1)
CHLORIDE SERPL-SCNC: 103 MMOL/L (ref 100–108)
CO2 SERPL-SCNC: 30 MMOL/L (ref 21–32)
CREAT SERPL-MCNC: 1.37 MG/DL (ref 0.6–1.3)
EOSINOPHIL # BLD AUTO: 0.28 THOUSAND/ΜL (ref 0–0.61)
EOSINOPHIL NFR BLD AUTO: 4 % (ref 0–6)
ERYTHROCYTE [DISTWIDTH] IN BLOOD BY AUTOMATED COUNT: 15.2 % (ref 11.6–15.1)
GFR SERPL CREATININE-BSD FRML MDRD: 53 ML/MIN/1.73SQ M
GLUCOSE SERPL-MCNC: 232 MG/DL (ref 65–140)
HCT VFR BLD AUTO: 43.4 % (ref 36.5–49.3)
HGB BLD-MCNC: 13.5 G/DL (ref 12–17)
IGA SERPL-MCNC: 539 MG/DL (ref 70–400)
IGG SERPL-MCNC: 823 MG/DL (ref 700–1600)
IGM SERPL-MCNC: 16 MG/DL (ref 40–230)
IMM GRANULOCYTES # BLD AUTO: 0.04 THOUSAND/UL (ref 0–0.2)
IMM GRANULOCYTES NFR BLD AUTO: 1 % (ref 0–2)
LYMPHOCYTES # BLD AUTO: 1.41 THOUSANDS/ΜL (ref 0.6–4.47)
LYMPHOCYTES NFR BLD AUTO: 20 % (ref 14–44)
MCH RBC QN AUTO: 28 PG (ref 26.8–34.3)
MCHC RBC AUTO-ENTMCNC: 31.1 G/DL (ref 31.4–37.4)
MCV RBC AUTO: 90 FL (ref 82–98)
MONOCYTES # BLD AUTO: 0.49 THOUSAND/ΜL (ref 0.17–1.22)
MONOCYTES NFR BLD AUTO: 7 % (ref 4–12)
NEUTROPHILS # BLD AUTO: 4.8 THOUSANDS/ΜL (ref 1.85–7.62)
NEUTS SEG NFR BLD AUTO: 67 % (ref 43–75)
NRBC BLD AUTO-RTO: 0 /100 WBCS
PLATELET # BLD AUTO: 189 THOUSANDS/UL (ref 149–390)
PMV BLD AUTO: 11.2 FL (ref 8.9–12.7)
POTASSIUM SERPL-SCNC: 3.7 MMOL/L (ref 3.5–5.3)
PROT SERPL-MCNC: 7.4 G/DL (ref 6.4–8.2)
RBC # BLD AUTO: 4.83 MILLION/UL (ref 3.88–5.62)
SODIUM SERPL-SCNC: 141 MMOL/L (ref 136–145)
WBC # BLD AUTO: 7.09 THOUSAND/UL (ref 4.31–10.16)

## 2020-01-06 PROCEDURE — 80053 COMPREHEN METABOLIC PANEL: CPT | Performed by: INTERNAL MEDICINE

## 2020-01-06 PROCEDURE — 85025 COMPLETE CBC W/AUTO DIFF WBC: CPT | Performed by: INTERNAL MEDICINE

## 2020-01-06 PROCEDURE — 84165 PROTEIN E-PHORESIS SERUM: CPT

## 2020-01-06 PROCEDURE — 82784 ASSAY IGA/IGD/IGG/IGM EACH: CPT

## 2020-01-06 PROCEDURE — 83883 ASSAY NEPHELOMETRY NOT SPEC: CPT

## 2020-01-06 PROCEDURE — 82232 ASSAY OF BETA-2 PROTEIN: CPT

## 2020-01-06 PROCEDURE — 84165 PROTEIN E-PHORESIS SERUM: CPT | Performed by: PATHOLOGY

## 2020-01-06 PROCEDURE — 96401 CHEMO ANTI-NEOPL SQ/IM: CPT

## 2020-01-06 RX ORDER — DEXAMETHASONE 4 MG/1
20 TABLET ORAL ONCE
Status: COMPLETED | OUTPATIENT
Start: 2020-01-06 | End: 2020-01-06

## 2020-01-06 RX ADMIN — BORTEZOMIB 3.5 MG: 3.5 INJECTION, POWDER, LYOPHILIZED, FOR SOLUTION INTRAVENOUS; SUBCUTANEOUS at 12:39

## 2020-01-06 RX ADMIN — DEXAMETHASONE 20 MG: 4 TABLET ORAL at 12:20

## 2020-01-06 NOTE — PLAN OF CARE
Problem: Potential for Falls  Goal: Patient will remain free of falls  Description  INTERVENTIONS:  - Assess patient frequently for physical needs  -  Identify cognitive and physical deficits and behaviors that affect risk of falls  -  [place] fall precautions as indicated by assessment   - Educate patient/family on patient safety including physical limitations  - Instruct patient to call for assistance with activity based on assessment  - Modify environment to reduce risk of injury  - Consider OT/PT consult to assist with strengthening/mobility  Outcome: Progressing     Problem: Knowledge Deficit  Goal: Patient/family/caregiver demonstrates understanding of disease process, treatment plan, medications, and discharge instructions  Description  Complete learning assessment and assess knowledge base    Interventions:  - Provide teaching at level of understanding  - Provide teaching via preferred learning methods  Outcome: Progressing

## 2020-01-07 ENCOUNTER — TELEPHONE (OUTPATIENT)
Dept: FAMILY MEDICINE CLINIC | Facility: CLINIC | Age: 70
End: 2020-01-07

## 2020-01-07 ENCOUNTER — ANTICOAG VISIT (OUTPATIENT)
Dept: CARDIOLOGY CLINIC | Facility: CLINIC | Age: 70
End: 2020-01-07

## 2020-01-07 ENCOUNTER — TELEPHONE (OUTPATIENT)
Dept: INFUSION CENTER | Facility: HOSPITAL | Age: 70
End: 2020-01-07

## 2020-01-07 DIAGNOSIS — I48.20 CHRONIC ATRIAL FIBRILLATION (HCC): ICD-10-CM

## 2020-01-07 LAB
INR PPP: 3.3 (ref 0.84–1.19)
KAPPA LC FREE SER-MCNC: 120.3 MG/L (ref 3.3–19.4)
KAPPA LC FREE/LAMBDA FREE SER: 6.72 {RATIO} (ref 0.26–1.65)
LAMBDA LC FREE SERPL-MCNC: 17.9 MG/L (ref 5.7–26.3)

## 2020-01-07 NOTE — TELEPHONE ENCOUNTER
SUKHDEV FROM [facility] CALLED STATES PT NEEDS A VNA SHE WILL BE FAXING PAPERWORK TO BE COMPLETED BY PROVIDER   IF YOU HAVE ANY QUESTIONS PLEASE CALL Srinivas Rodríguez [phone]

## 2020-01-07 NOTE — TELEPHONE ENCOUNTER
Call from Jair at [facility] questioning whether patient is to continue weekly lab draws as ordered by Dr Chapito Nye  Patient has appts for chemotherapy treatments on 1/13 and 1/20 2020 with a f/u with NP on 1/27/2020   Orders will continue to be followed until further direction after appt with NP

## 2020-01-07 NOTE — TELEPHONE ENCOUNTER
There was no provider list on call for tonight in [place] [address]  I called practice administrator via cell and left message on voice mail  Due to urgent nature of the call, I then called Audelia Bruno via cell phone  She wasn't sure who was on call, but thought it might be Dr Adrien Rivas was paged via AP @ [phone]: [phone]/ Laura Thayer RN/ [employer]/ Kwesi Cummins/ 1950/ hyperglycemic  Dr Adrien Rivas returned call and was connected to [facility] via conference call @ [phone]

## 2020-01-07 NOTE — PROGRESS NOTES
Received call from the answering service about this patient  Transferred to [facility]  Patient has high blood sugar, > 400  I did try to review the notes from before, and it appears that hes had high sugars in the past   Hes overdue for an appointment, and one should have bloodwork and office visit  Nursing staff at [facility] also mentioned that he did have chemo therapy today, which usually marcela es his blood sugar run high  They did ask about increasing his Lantus overnight, but I would prefer that that not be adjusted at all, and if they contact this office to have Dr Nini Lu see him

## 2020-01-07 NOTE — PROGRESS NOTES
1/7/20, tc to [address] [place] [address], spoke with Parisa Owens, pt was discharged yesterday from [facility] rehab, had 10 mg 1/6  Will hold x 1 day, then 5 mg wed, 7 5 mg thurs, inr due fri  Faxed same   kathya

## 2020-01-08 ENCOUNTER — TELEPHONE (OUTPATIENT)
Dept: HEMATOLOGY ONCOLOGY | Facility: CLINIC | Age: 70
End: 2020-01-08

## 2020-01-08 LAB
ALBUMIN SERPL ELPH-MCNC: 3.86 G/DL (ref 3.5–5)
ALBUMIN SERPL ELPH-MCNC: 54.4 % (ref 52–65)
ALPHA1 GLOB SERPL ELPH-MCNC: 0.3 G/DL (ref 0.1–0.4)
ALPHA1 GLOB SERPL ELPH-MCNC: 4.2 % (ref 2.5–5)
ALPHA2 GLOB SERPL ELPH-MCNC: 0.92 G/DL (ref 0.4–1.2)
ALPHA2 GLOB SERPL ELPH-MCNC: 13 % (ref 7–13)
B2 MICROGLOB SERPL-MCNC: 3.4 MG/L (ref 0.6–2.4)
BETA GLOB ABNORMAL SERPL ELPH-MCNC: 0.48 G/DL (ref 0.4–0.8)
BETA1 GLOB SERPL ELPH-MCNC: 6.8 % (ref 5–13)
BETA2 GLOB SERPL ELPH-MCNC: 11.5 % (ref 2–8)
BETA2+GAMMA GLOB SERPL ELPH-MCNC: 0.82 G/DL (ref 0.2–0.5)
GAMMA GLOB ABNORMAL SERPL ELPH-MCNC: 0.72 G/DL (ref 0.5–1.6)
GAMMA GLOB SERPL ELPH-MCNC: 10.1 % (ref 12–22)
IGG/ALB SER: 1.19 {RATIO} (ref 1.1–1.8)
M PEAK ID 2: 1.7 %
M PROTEIN 1 MFR SERPL ELPH: 3.5 %
M PROTEIN 1 SERPL ELPH-MCNC: 0.25 G/DL
M PROTEIN 2 SERPL ELPH-MCNC: 0.12 G/DL
PROT PATTERN SERPL ELPH-IMP: ABNORMAL
PROT SERPL-MCNC: 7.1 G/DL (ref 6.4–8.2)

## 2020-01-08 NOTE — TELEPHONE ENCOUNTER
Linda Quezada from [facility] wanted to verify that patient's appointment's on 1-27-20 were in the same building  I did confirm with Linda Quezada they are at the same location

## 2020-01-09 ENCOUNTER — TELEPHONE (OUTPATIENT)
Dept: FAMILY MEDICINE CLINIC | Facility: CLINIC | Age: 70
End: 2020-01-09

## 2020-01-09 ENCOUNTER — TELEPHONE (OUTPATIENT)
Dept: HEMATOLOGY ONCOLOGY | Facility: CLINIC | Age: 70
End: 2020-01-09

## 2020-01-09 NOTE — TELEPHONE ENCOUNTER
Verified with Edward Running patient had his labs drawn at [facility] today at 6:40AM

## 2020-01-09 NOTE — TELEPHONE ENCOUNTER
Attempted to call Naif Cedeno back after getting disconnected on the phone  Naif Cedeno was questioning if patient needed more blood work prior to his 1/13/20 Bortezomid injection  Patient had a CBC and CMP at [facility] today  Verified with Himanshu Ray RN that today's labs would be ok to use for 1/13/20 treatment  Attempted to leave a message but Aniyah's voicemail was full  Call placed to the patient advised that we could use today's labs for treatment

## 2020-01-10 ENCOUNTER — TELEPHONE (OUTPATIENT)
Dept: HEMATOLOGY ONCOLOGY | Facility: CLINIC | Age: 70
End: 2020-01-10

## 2020-01-10 ENCOUNTER — ANTICOAG VISIT (OUTPATIENT)
Dept: CARDIOLOGY CLINIC | Facility: CLINIC | Age: 70
End: 2020-01-10

## 2020-01-10 DIAGNOSIS — I48.20 CHRONIC ATRIAL FIBRILLATION (HCC): ICD-10-CM

## 2020-01-10 LAB — INR PPP: 1.9 (ref 0.84–1.19)

## 2020-01-10 NOTE — PROGRESS NOTES
1/10/20, tc to nino at [facility], will take 7 5 mg daily, inr due 4 days  Faxed to [employer]   kathya

## 2020-01-13 ENCOUNTER — TELEPHONE (OUTPATIENT)
Dept: FAMILY MEDICINE CLINIC | Facility: CLINIC | Age: 70
End: 2020-01-13

## 2020-01-13 ENCOUNTER — HOSPITAL ENCOUNTER (OUTPATIENT)
Dept: INFUSION CENTER | Facility: HOSPITAL | Age: 70
Discharge: HOME | End: 2020-01-13
Attending: INTERNAL MEDICINE
Payer: COMMERCIAL

## 2020-01-13 VITALS
HEIGHT: 75 IN | TEMPERATURE: 98.3 F | WEIGHT: 315 LBS | DIASTOLIC BLOOD PRESSURE: 78 MMHG | BODY MASS INDEX: 39.17 KG/M2 | OXYGEN SATURATION: 94 % | RESPIRATION RATE: 20 BRPM | SYSTOLIC BLOOD PRESSURE: 136 MMHG | HEART RATE: 65 BPM

## 2020-01-13 DIAGNOSIS — C90.00 MULTIPLE MYELOMA NOT HAVING ACHIEVED REMISSION (HCC): Primary | ICD-10-CM

## 2020-01-13 PROCEDURE — 96401 CHEMO ANTI-NEOPL SQ/IM: CPT

## 2020-01-13 RX ORDER — DEXAMETHASONE 4 MG/1
20 TABLET ORAL ONCE
Status: COMPLETED | OUTPATIENT
Start: 2020-01-13 | End: 2020-01-13

## 2020-01-13 RX ORDER — CHOLECALCIFEROL (VITAMIN D3) 125 MCG
CAPSULE ORAL
COMMUNITY
End: 2020-02-07

## 2020-01-13 RX ADMIN — BORTEZOMIB 3.5 MG: 3.5 INJECTION, POWDER, LYOPHILIZED, FOR SOLUTION INTRAVENOUS; SUBCUTANEOUS at 08:41

## 2020-01-13 RX ADMIN — DEXAMETHASONE 20 MG: 4 TABLET ORAL at 08:24

## 2020-01-13 NOTE — PROGRESS NOTES
Pt arrived on unit for injection, labs within parameters, vss  Pt offers no complaints at this time  Will cont to hermelinda

## 2020-01-13 NOTE — TELEPHONE ENCOUNTER
Nurse from facility called to inform Dr Wendy Gabriel pt's blood sugar was 489  Per MF, pt is to go to ER

## 2020-01-13 NOTE — TELEPHONE ENCOUNTER
Call from nursing home pt refused EMT's and signed a refusal form, they will be faxing that to the office

## 2020-01-14 ENCOUNTER — ANTICOAG VISIT (OUTPATIENT)
Dept: CARDIOLOGY CLINIC | Facility: CLINIC | Age: 70
End: 2020-01-14

## 2020-01-14 DIAGNOSIS — I48.20 CHRONIC ATRIAL FIBRILLATION (HCC): ICD-10-CM

## 2020-01-14 LAB — INR PPP: 1.8 (ref 0.84–1.19)

## 2020-01-14 NOTE — PROGRESS NOTES
1/14/20, tc to [address] [place] [address], left message to call office regarding mutual patient  Faxed to nino at [facility], take 10 mg tues, 7 5 mg other days of the week, inr due 1//20   kathya

## 2020-01-16 ENCOUNTER — TELEPHONE (OUTPATIENT)
Dept: HEMATOLOGY ONCOLOGY | Facility: CLINIC | Age: 70
End: 2020-01-16

## 2020-01-16 NOTE — TELEPHONE ENCOUNTER
Patient called to speak with Purvi Jenkins, he asked if he wanted him to come earlier to his appointment since he has an infusion appointment at 1:30  He said he just spoke with him an hour ago

## 2020-01-16 NOTE — TELEPHONE ENCOUNTER
Called and spoke with the patient  He was confused and thought he was scheduled to see us at 140 but I clarified that it was 1140 that he was scheduled for

## 2020-01-17 ENCOUNTER — APPOINTMENT (OUTPATIENT)
Dept: LAB | Facility: CLINIC | Age: 70
End: 2020-01-17
Payer: COMMERCIAL

## 2020-01-17 DIAGNOSIS — C90.00 MULTIPLE MYELOMA NOT HAVING ACHIEVED REMISSION (HCC): ICD-10-CM

## 2020-01-20 ENCOUNTER — TELEPHONE (OUTPATIENT)
Dept: HEMATOLOGY ONCOLOGY | Facility: CLINIC | Age: 70
End: 2020-01-20

## 2020-01-20 ENCOUNTER — HOSPITAL ENCOUNTER (OUTPATIENT)
Dept: INFUSION CENTER | Facility: HOSPITAL | Age: 70
Discharge: HOME | End: 2020-01-20
Attending: INTERNAL MEDICINE
Payer: COMMERCIAL

## 2020-01-20 VITALS
RESPIRATION RATE: 20 BRPM | SYSTOLIC BLOOD PRESSURE: 135 MMHG | BODY MASS INDEX: 39.17 KG/M2 | OXYGEN SATURATION: 96 % | TEMPERATURE: 98.1 F | DIASTOLIC BLOOD PRESSURE: 68 MMHG | WEIGHT: 315 LBS | HEART RATE: 72 BPM | HEIGHT: 75 IN

## 2020-01-20 DIAGNOSIS — C90.00 MULTIPLE MYELOMA NOT HAVING ACHIEVED REMISSION (HCC): Primary | ICD-10-CM

## 2020-01-20 PROCEDURE — 96401 CHEMO ANTI-NEOPL SQ/IM: CPT

## 2020-01-20 RX ORDER — DEXAMETHASONE 4 MG/1
20 TABLET ORAL ONCE
Status: COMPLETED | OUTPATIENT
Start: 2020-01-20 | End: 2020-01-20

## 2020-01-20 RX ADMIN — BORTEZOMIB 3.5 MG: 3.5 INJECTION, POWDER, LYOPHILIZED, FOR SOLUTION INTRAVENOUS; SUBCUTANEOUS at 10:33

## 2020-01-20 RX ADMIN — DEXAMETHASONE 20 MG: 4 TABLET ORAL at 10:13

## 2020-01-20 NOTE — TELEPHONE ENCOUNTER
Shellie Pickett from [facility] called to verify patient appt's   I did confirm patient is scheduled with Dr Bhavna Henry on 1-27-20 @ 11:40 and then has a infusion scheduled on 1-27-20 @ 1:30pm

## 2020-01-22 ENCOUNTER — OFFICE VISIT (OUTPATIENT)
Dept: FAMILY MEDICINE CLINIC | Facility: CLINIC | Age: 70
End: 2020-01-22
Payer: COMMERCIAL

## 2020-01-22 VITALS
DIASTOLIC BLOOD PRESSURE: 72 MMHG | TEMPERATURE: 97.5 F | HEART RATE: 74 BPM | SYSTOLIC BLOOD PRESSURE: 132 MMHG | BODY MASS INDEX: 39.17 KG/M2 | RESPIRATION RATE: 16 BRPM | HEIGHT: 75 IN | WEIGHT: 315 LBS

## 2020-01-22 DIAGNOSIS — Z23 NEED FOR PNEUMOCOCCAL VACCINATION: ICD-10-CM

## 2020-01-22 DIAGNOSIS — I50.33 ACUTE ON CHRONIC DIASTOLIC CONGESTIVE HEART FAILURE (HCC): Chronic | ICD-10-CM

## 2020-01-22 DIAGNOSIS — E11.641 UNCONTROLLED TYPE 2 DIABETES MELLITUS WITH HYPOGLYCEMIA AND COMA (HCC): Chronic | ICD-10-CM

## 2020-01-22 DIAGNOSIS — L03.116 LEFT LEG CELLULITIS: ICD-10-CM

## 2020-01-22 DIAGNOSIS — E66.01 MORBID OBESITY WITH BMI OF 40.0-44.9, ADULT (HCC): ICD-10-CM

## 2020-01-22 DIAGNOSIS — I48.20 CHRONIC ATRIAL FIBRILLATION (HCC): Primary | Chronic | ICD-10-CM

## 2020-01-22 DIAGNOSIS — E66.01 MORBID OBESITY (HCC): Chronic | ICD-10-CM

## 2020-01-22 DIAGNOSIS — C90.00 MULTIPLE MYELOMA NOT HAVING ACHIEVED REMISSION (HCC): Chronic | ICD-10-CM

## 2020-01-22 PROBLEM — A41.9 SEPSIS DUE TO CELLULITIS (HCC): Status: RESOLVED | Noted: 2019-09-23 | Resolved: 2020-01-22

## 2020-01-22 PROBLEM — L03.90 SEPSIS DUE TO CELLULITIS (HCC): Status: RESOLVED | Noted: 2019-09-23 | Resolved: 2020-01-22

## 2020-01-22 PROBLEM — R74.01 TRANSAMINITIS: Status: RESOLVED | Noted: 2019-10-29 | Resolved: 2020-01-22

## 2020-01-22 PROBLEM — R74.8 BLOOD ALKALINE PHOSPHATASE INCREASED COMPARED WITH PRIOR MEASUREMENT: Status: RESOLVED | Noted: 2017-06-05 | Resolved: 2020-01-22

## 2020-01-22 PROBLEM — D47.2 BICLONAL GAMMOPATHY: Chronic | Status: RESOLVED | Noted: 2019-08-08 | Resolved: 2020-01-22

## 2020-01-22 PROCEDURE — 3008F BODY MASS INDEX DOCD: CPT | Performed by: FAMILY MEDICINE

## 2020-01-22 PROCEDURE — 90732 PPSV23 VACC 2 YRS+ SUBQ/IM: CPT | Performed by: FAMILY MEDICINE

## 2020-01-22 PROCEDURE — 99214 OFFICE O/P EST MOD 30 MIN: CPT | Performed by: FAMILY MEDICINE

## 2020-01-22 PROCEDURE — G0009 ADMIN PNEUMOCOCCAL VACCINE: HCPCS | Performed by: FAMILY MEDICINE

## 2020-01-22 RX ORDER — CLOTRIMAZOLE 1 %
CREAM (GRAM) TOPICAL
Qty: 45 G | Refills: 3 | Status: ON HOLD | OUTPATIENT
Start: 2020-01-22

## 2020-01-22 NOTE — ASSESSMENT & PLAN NOTE
Wt Readings from Last 3 Encounters:   01/22/20 (!) 152 kg (335 lb)   01/20/20 (!) 153 kg (338 lb 3 oz)   01/13/20 (!) 154 kg (340 lb 9 8 oz)         Sees cardiology

## 2020-01-22 NOTE — PROGRESS NOTES
Assessment/Plan:    Diabetes mellitus type 2, uncontrolled ([facility] )    Lab Results   Component Value Date    HGBA1C 6 1 12/05/2019   BS running higher since taking chemo for multiple myeloma so will increase to 22 units    Chronic obstructive pulmonary disease, unspecified (HCC)  Stable on Advair    Essential hypertension  BP stable    Acute on chronic diastolic congestive heart failure (HCC)  Wt Readings from Last 3 Encounters:   01/22/20 (!) 152 kg (335 lb)   01/20/20 (!) 153 kg (338 lb 3 oz)   01/13/20 (!) 154 kg (340 lb 9 8 oz)         Sees cardiology    Chronic atrial fibrillation  Sees cardiology    Multiple myeloma ([place] [address] )  Seeing oncology    Morbid obesity ([place] [address] )  In facility-they don't regulate diet that well       Diagnoses and all orders for this visit:    Chronic atrial fibrillation    Uncontrolled type 2 diabetes mellitus with hypoglycemia and coma ([facility] )  -     insulin glargine (LANTUS SOLOSTAR) 100 units/mL injection pen; Inject 22 Units under the skin daily  -     Hemoglobin A1C; Future    Acute on chronic diastolic congestive heart failure (HCC)    Multiple myeloma not having achieved remission (HCC)    Morbid obesity (HCC)          Subjective:      Patient ID: Irene Keenan is a 81 y o  male  Here for f/u diabetes, chemo making his BS go up, wonders whether dose needs to be up until done chemo, doing well otherwise      The following portions of the patient's history were reviewed and updated as appropriate: allergies, current medications, past family history, past medical history, past social history, past surgical history and problem list       Review of Systems   Constitutional: Negative for activity change, appetite change and unexpected weight change  Eyes: Negative for visual disturbance  Respiratory: Negative for shortness of breath  Cardiovascular: Positive for leg swelling  Negative for chest pain  Neurological: Negative for dizziness and headaches     Psychiatric/Behavioral: Negative for dysphoric mood  The patient is not nervous/anxious  Objective:      /72   Pulse 74   Temp 97 5 °F (36 4 °C) (Oral)   Resp 16   Ht 6' 3" (1 905 m)   Wt (!) 152 kg (335 lb) Comment: pt reported  BMI 41 87 kg/m²          Physical Exam   Constitutional: He appears well-developed and well-nourished  Neck: No thyromegaly present  Cardiovascular: Normal rate and normal heart sounds  An irregularly irregular rhythm present  Musculoskeletal: He exhibits edema  2 plus edema   Lymphadenopathy:     He has no cervical adenopathy  Vitals reviewed  BMI Counseling: Body mass index is 41 87 kg/m²  The BMI is above normal  Nutrition recommendations include reducing portion sizes, decreasing overall calorie intake, 3-5 servings of fruits/vegetables daily, reducing fast food intake and consuming healthier snacks  Exercise recommendations include exercising 3-5 times per week

## 2020-01-22 NOTE — PATIENT INSTRUCTIONS
Change Lantus to 22 units, needs pneumovax  Obesity   AMBULATORY CARE:   Obesity  is when your body mass index (BMI) is greater than 30  Your healthcare provider will use your height and weight to measure your BMI  The risks of obesity include  many health problems, such as injuries or physical disability  You may need tests to check for the following:  · Diabetes     · High blood pressure or high cholesterol     · Heart disease     · Gallbladder or liver disease     · Cancer of the colon, breast, prostate, liver, or kidney     · Sleep apnea     · Arthritis or gout  Seek care immediately if:   · You have a severe headache, confusion, or difficulty speaking  · You have weakness on one side of your body  · You have chest pain, sweating, or shortness of breath  Contact your healthcare provider if:   · You have symptoms of gallbladder or liver disease, such as pain in your upper abdomen  · You have knee or hip pain and discomfort while walking  · You have symptoms of diabetes, such as intense hunger and thirst, and frequent urination  · You have symptoms of sleep apnea, such as snoring or daytime sleepiness  · You have questions or concerns about your condition or care  Treatment for obesity  focuses on helping you lose weight to improve your health  Even a small decrease in BMI can reduce the risk for many health problems  Your healthcare provider will help you set a weight-loss goal   · Lifestyle changes  are the first step in treating obesity  These include making healthy food choices and getting regular physical activity  Your healthcare provider may suggest a weight-loss program that involves coaching, education, and therapy  · Medicine  may help you lose weight when it is used with a healthy diet and physical activity  · Surgery  can help you lose weight if you are very obese and have other health problems  There are several types of weight-loss surgery   Ask your healthcare provider for more information  Be successful losing weight:   · Set small, realistic goals  An example of a small goal is to walk for 20 minutes 5 days a week  Anther goal is to lose 5% of your body weight  · Tell friends, family members, and coworkers about your goals  and ask for their support  Ask a friend to lose weight with you, or join a weight-loss support group  · Identify foods or triggers that may cause you to overeat , and find ways to avoid them  Remove tempting high-calorie foods from your home and workplace  Place a bowl of fresh fruit on your kitchen counter  If stress causes you to eat, then find other ways to cope with stress  · Keep a diary to track what you eat and drink  Also write down how many minutes of physical activity you do each day  Weigh yourself once a week and record it in your diary  Eating changes: You will need to eat 500 to 1,000 fewer calories each day than you currently eat to lose 1 to 2 pounds a week  The following changes will help you cut calories:  · Eat smaller portions  Use small plates, no larger than 9 inches in diameter  Fill your plate half full of fruits and vegetables  Measure your food using measuring cups until you know what a serving size looks like  · Eat 3 meals and 1 or 2 snacks each day  Plan your meals in advance  Abdirizak Mustache and eat at home most of the time  Eat slowly  · Eat fruits and vegetables at every meal   They are low in calories and high in fiber, which makes you feel full  Do not add butter, margarine, or cream sauce to vegetables  Use herbs to season steamed vegetables  · Eat less fat and fewer fried foods  Eat more baked or grilled chicken and fish  These protein sources are lower in calories and fat than red meat  Limit fast food  Dress your salads with olive oil and vinegar instead of bottled dressing  · Limit the amount of sugar you eat  Do not drink sugary beverages  Limit alcohol    Activity changes:  Physical activity is good for your body in many ways  It helps you burn calories and build strong muscles  It decreases stress and depression, and improves your mood  It can also help you sleep better  Talk to your healthcare provider before you begin an exercise program   · Exercise for at least 30 minutes 5 days a week  Start slowly  Set aside time each day for physical activity that you enjoy and that is convenient for you  It is best to do both weight training and an activity that increases your heart rate, such as walking, bicycling, or swimming  · Find ways to be more active  Do yard work and housecleaning  Walk up the stairs instead of using elevators  Spend your leisure time going to events that require walking, such as outdoor festivals or fairs  This extra physical activity can help you lose weight and keep it off  Follow up with your healthcare provider as directed: You may need to meet with a dietitian  Write down your questions so you remember to ask them during your visits  © 2017 [employer] Information is for End User's use only and may not be sold, redistributed or otherwise used for commercial purposes  All illustrations and images included in CareNotes® are the copyrighted property of [employer]  or [employer]  The above information is an  only  It is not intended as medical advice for individual conditions or treatments  Talk to your doctor, nurse or pharmacist before following any medical regimen to see if it is safe and effective for you  Weight Management   AMBULATORY CARE:   Why it is important to manage your weight:  Being overweight increases your risk of health conditions such as heart disease, high blood pressure, type 2 diabetes, and certain types of cancer  It can also increase your risk for osteoarthritis, sleep apnea, and other respiratory problems  Aim for a slow, steady weight loss   Even a small amount of weight loss can lower your risk of health problems  How to lose weight safely:  A safe and healthy way to lose weight is to eat fewer calories and get regular exercise  You can lose up about 1 pound a week by decreasing the number of calories you eat by 500 calories each day  You can decrease calories by eating smaller portion sizes or by cutting out high-calorie foods  Read labels to find out how many calories are in the foods you eat  You can also burn calories with exercise such as walking, swimming, or biking  You will be more likely to keep weight off if you make these changes part of your lifestyle  Healthy meal plan for weight management:  A healthy meal plan includes a variety of foods, contains fewer calories, and helps you stay healthy  A healthy meal plan includes the following:  · Eat whole-grain foods more often  A healthy meal plan should contain fiber  Fiber is the part of grains, fruits, and vegetables that is not broken down by your body  Whole-grain foods are healthy and provide extra fiber in your diet  Some examples of whole-grain foods are whole-wheat breads and pastas, oatmeal, brown rice, and bulgur  · Eat a variety of vegetables every day  Include dark, leafy greens such as spinach, kale, anthony greens, and mustard greens  Eat yellow and orange vegetables such as carrots, sweet potatoes, and winter squash  · Eat a variety of fruits every day  Choose fresh or canned fruit (canned in its own juice or light syrup) instead of juice  Fruit juice has very little or no fiber  · Eat low-fat dairy foods  Drink fat-free (skim) milk or 1% milk  Eat fat-free yogurt and low-fat cottage cheese  Try low-fat cheeses such as mozzarella and other reduced-fat cheeses  · Choose meat and other protein foods that are low in fat  Choose beans or other legumes such as split peas or lentils  Choose fish, skinless poultry (chicken or turkey), or lean cuts of red meat (beef or pork)   Before you cook meat or poultry, cut off any visible fat  · Use less fat and oil  Try baking foods instead of frying them  Add less fat, such as margarine, sour cream, regular salad dressing and mayonnaise to foods  Eat fewer high-fat foods  Some examples of high-fat foods include french fries, doughnuts, ice cream, and cakes  · Eat fewer sweets  Limit foods and drinks that are high in sugar  This includes candy, cookies, regular soda, and sweetened drinks  Ways to decrease calories:   · Eat smaller portions  ¨ Use a small plate with smaller servings  ¨ Do not eat second helpings  ¨ When you eat at a restaurant, ask for a box and place half of your meal in the box before you eat  ¨ Share an entrée with someone else  · Replace high-calorie snacks with healthy, low-calorie snacks  ¨ Choose fresh fruit, vegetables, fat-free rice cakes, or air-popped popcorn instead of potato chips, nuts, or chocolate  ¨ Choose water or calorie-free drinks instead of soda or sweetened drinks  · Eat regular meals  Skipping meals can lead to overeating later in the day  Eat a healthy snack in place of a meal if you do not have time to eat a regular meal      · Do not shop for groceries when you are hungry  You may be more likely to make unhealthy food choices  Take a grocery list of healthy foods and shop after you have eaten  Exercise:  Exercise at least 30 minutes per day on most days of the week  Some examples of exercise include walking, biking, dancing, and swimming  You can also fit in more physical activity by taking the stairs instead of the elevator or parking farther away from stores  Ask your healthcare provider about the best exercise plan for you  Other things to consider as you try to lose weight:   · Be aware of situations that may give you the urge to overeat, such as eating while watching television  Find ways to avoid these situations  For example, read a book, go for a walk, or do crafts      · Meet with a weight loss support group or friends who are also trying to lose weight  This may help you stay motivated to continue working on your weight loss goals  © 2017 [employer]  Information is for End User's use only and may not be sold, redistributed or otherwise used for commercial purposes  All illustrations and images included in CareNotes® are the copyrighted property of [employer]  or [employer]  The above information is an  only  It is not intended as medical advice for individual conditions or treatments  Talk to your doctor, nurse or pharmacist before following any medical regimen to see if it is safe and effective for you  Low Fat Diet   AMBULATORY CARE:   A low-fat diet  is an eating plan that is low in total fat, unhealthy fat, and cholesterol  You may need to follow a low-fat diet if you have trouble digesting or absorbing fat  You may also need to follow this diet if you have high cholesterol  You can also lower your cholesterol by increasing the amount of fiber in your diet  Soluble fiber is a type of fiber that helps to decrease cholesterol levels  Different types of fat in food:   · Limit unhealthy fats  A diet that is high in cholesterol, saturated fat, and trans fat may cause unhealthy cholesterol levels  Unhealthy cholesterol levels increase your risk of heart disease  ¨ Cholesterol:  Limit intake of cholesterol to less than 200 mg per day  Cholesterol is found in meat, eggs, and dairy  ¨ Saturated fat:  Limit saturated fat to less than 7% of your total daily calories  Ask your dietitian how many calories you need each day  Saturated fat is found in butter, cheese, ice cream, whole milk, and palm oil  Saturated fat is also found in meat, such as beef, pork, chicken skin, and processed meats  Processed meats include sausage, hot dogs, and bologna  ¨ Trans fat:  Avoid trans fat as much as possible  Trans fat is used in fried and baked foods   Foods that say trans fat free on the label may still have up to 0 5 grams of trans fat per serving  · Include healthy fats  Replace foods that are high in saturated and trans fat with foods high in healthy fats  This may help to decrease high cholesterol levels  ¨ Monounsaturated fats: These are found in avocados, nuts, and vegetable oils, such as olive, canola, and sunflower oil  ¨ Polyunsaturated fats: These can be found in vegetable oils, such as soybean or corn oil  Omega-3 fats can help to decrease the risk of heart disease  Omega-3 fats are found in fish, such as salmon, herring, trout, and tuna  Omega-3 fats can also be found in plant foods, such as walnuts, flaxseed, soybeans, and canola oil    Foods to limit or avoid:   · Grains:      ¨ Snacks that are made with partially hydrogenated oils, such as chips, regular crackers, and butter-flavored popcorn    ¨ High-fat baked goods, such as biscuits, croissants, doughnuts, pies, cookies, and pastries    · Dairy:      ¨ Whole milk, 2% milk, and yogurt and ice cream made with whole milk    ¨ Half and half creamer, heavy cream, and whipping cream    ¨ Cheese, cream cheese, and sour cream    · Meats and proteins:      ¨ High-fat cuts of meat (T-bone steak, regular hamburger, and ribs)    ¨ Fried meat, poultry (turkey and chicken), and fish    ¨ Poultry (chicken and turkey) with skin    ¨ Cold cuts (salami or bologna), hot dogs, roberts, and sausage    ¨ Whole eggs and egg yolks    · Vegetables and fruits with added fat:      ¨ Fried vegetables or vegetables in butter or high-fat sauces, such as cream or cheese sauces    ¨ Fried fruit or fruit served with butter or cream    · Fats:      ¨ Butter, stick margarine, and shortening    ¨ Coconut, palm oil, and palm kernel oil  Foods to include:   · Grains:      ¨ Whole-grain breads, cereals, pasta, and brown rice    ¨ Low-fat crackers and pretzels    · Vegetables and fruits:      ¨ Fresh, frozen, or canned vegetables (no salt or low-sodium)    ¨ Fresh, frozen, dried, or canned fruit (canned in light syrup or fruit juice)    ¨ Avocado    · Low-fat dairy products:      ¨ Nonfat (skim) or 1% milk    ¨ Nonfat or low-fat cheese, yogurt, and cottage cheese    · Meats and proteins:      ¨ Chicken or turkey with no skin    ¨ Baked or broiled fish    ¨ Lean beef and pork (loin, round, extra lean hamburger)    ¨ Beans and peas, unsalted nuts, soy products    ¨ Egg whites and substitutes    ¨ Seeds and nuts    · Fats:      ¨ Unsaturated oil, such as canola, olive, peanut, soybean, or sunflower oil    ¨ Soft or liquid margarine and vegetable oil spread    ¨ Low-fat salad dressing  Other ways to decrease fat:   · Read food labels before you buy foods  Choose foods that have less than 30% of calories from fat  Choose low-fat or fat-free dairy products  Remember that fat free does not mean calorie free  These foods still contain calories, and too many calories can lead to weight gain  · Trim fat from meat and avoid fried food  Trim all visible fat from meat before you cook it  Remove the skin from poultry  Do not stewart meat, fish, or poultry  Bake, roast, boil, or broil these foods instead  Avoid fried foods  Eat a baked potato instead of [place] Kristina fries  Steam vegetables instead of sautéing them in butter  · Add less fat to foods  Use imitation roberts bits on salads and baked potatoes instead of regular roberts bits  Use fat-free or low-fat salad dressings instead of regular dressings  Use low-fat or nonfat butter-flavored topping instead of regular butter or margarine on popcorn and other foods  Ways to decrease fat in recipes:  Replace high-fat ingredients with low-fat or nonfat ones  This may cause baked goods to be drier than usual  You may need to use nonfat cooking spray on pans to prevent food from sticking  You also may need to change the amount of other ingredients, such as water, in the recipe   Try the following:  · Use low-fat or light margarine instead of regular margarine or shortening  · Use lean ground turkey breast or chicken, or lean ground beef (less than 5% fat) instead of hamburger  · Add 1 teaspoon of canola oil to 8 ounces of skim milk instead of using cream or half and half  · Use grated zucchini, carrots, or apples in breads instead of coconut  · Use blenderized, low-fat cottage cheese, plain tofu, or low-fat ricotta cheese instead of cream cheese  · Use 1 egg white and 1 teaspoon of canola oil, or use ¼ cup (2 ounces) of fat-free egg substitute instead of a whole egg  · Replace half of the oil that is called for in a recipe with applesauce when you bake  Use 3 tablespoons of cocoa powder and 1 tablespoon of canola oil instead of a square of baking chocolate  How to increase fiber:  Eat enough high-fiber foods to get 20 to 30 grams of fiber every day  Slowly increase your fiber intake to avoid stomach cramps, gas, and other problems  · Eat 3 ounces of whole-grain foods each day  An ounce is about 1 slice of bread  Eat whole-grain breads, such as whole-wheat bread  Whole wheat, whole-wheat flour, or other whole grains should be listed as the first ingredient on the food label  Replace white flour with whole-grain flour or use half of each in recipes  Whole-grain flour is heavier than white flour, so you may have to add more yeast or baking powder  · Eat a high-fiber cereal for breakfast   Oatmeal is a good source of soluble fiber  Look for cereals that have bran or fiber in the name  Choose whole-grain products, such as brown rice, barley, and whole-wheat pasta  · Eat more beans, peas, and lentils  For example, add beans to soups or salads  Eat at least 5 cups of fruits and vegetables each day  Eat fruits and vegetables with the peel because the peel is high in fiber    © 2017 [employer] Information is for End User's use only and may not be sold, redistributed or otherwise used for commercial purposes  All illustrations and images included in CareNotes® are the copyrighted property of [employer]  or [employer]  The above information is an  only  It is not intended as medical advice for individual conditions or treatments  Talk to your doctor, nurse or pharmacist before following any medical regimen to see if it is safe and effective for you  Heart Healthy Diet   AMBULATORY CARE:   A heart healthy diet  is an eating plan low in total fat, unhealthy fats, and sodium (salt)  A heart healthy diet helps decrease your risk for heart disease and stroke  Limit the amount of fat you eat to 25% to 35% of your total daily calories  Limit sodium to less than 2,300 mg each day  Healthy fats:  Healthy fats can help improve cholesterol levels  The risk for heart disease is decreased when cholesterol levels are normal  Choose healthy fats, such as the following:  · Unsaturated fat  is found in foods such as soybean, canola, olive, corn, and safflower oils  It is also found in soft tub margarine that is made with liquid vegetable oil  · Omega-3 fat  is found in certain fish, such as salmon, tuna, and trout, and in walnuts and flaxseed  Unhealthy fats:  Unhealthy fats can cause unhealthy cholesterol levels in your blood and increase your risk of heart disease  Limit unhealthy fats, such as the following:  · Cholesterol  is found in animal foods, such as eggs and lobster, and in dairy products made from whole milk  Limit cholesterol to less than 300 milligrams (mg) each day  You may need to limit cholesterol to 200 mg each day if you have heart disease  · Saturated fat  is found in meats, such as roberts and hamburger  It is also found in chicken or turkey skin, whole milk, and butter  Limit saturated fat to less than 7% of your total daily calories  Limit saturated fat to less than 6% if you have heart disease or are at increased risk for it       · Trans fat  is found in packaged foods, such as potato chips and cookies  It is also in hard margarine, some fried foods, and shortening  Avoid trans fats as much as possible    Heart healthy foods and drinks to include:  Ask your dietitian or healthcare provider how many servings to have from each of the following food groups:  · Grains:      ¨ Whole-wheat breads, cereals, and pastas, and brown rice    ¨ Low-fat, low-sodium crackers and chips    · Vegetables:      ¨ Broccoli, green beans, green peas, and spinach    ¨ Collards, kale, and lima beans    ¨ Carrots, sweet potatoes, tomatoes, and peppers    ¨ Canned vegetables with no salt added    · Fruits:      ¨ Bananas, peaches, pears, and pineapple    ¨ Grapes, raisins, and dates    ¨ Oranges, tangerines, grapefruit, orange juice, and grapefruit juice    ¨ Apricots, mangoes, melons, and papaya    ¨ Raspberries and strawberries    ¨ Canned fruit with no added sugar    · Low-fat dairy products:      ¨ Nonfat (skim) milk, 1% milk, and low-fat almond, cashew, or soy milks fortified with calcium    ¨ Low-fat cheese, regular or frozen yogurt, and cottage cheese    · Meats and proteins , such as lean cuts of beef and pork (loin, leg, round), skinless chicken and turkey, legumes, soy products, egg whites, and nuts  Foods and drinks to limit or avoid:  Ask your dietitian or healthcare provider about these and other foods that are high in unhealthy fat, sodium, and sugar:  · Snack or packaged foods , such as frozen dinners, cookies, macaroni and cheese, and cereals with more than 300 mg of sodium per serving    · Canned or dry mixes  for cakes, soups, sauces, or gravies    · Vegetables with added sodium , such as instant potatoes, vegetables with added sauces, or regular canned vegetables    · Other foods high in sodium , such as ketchup, barbecue sauce, salad dressing, pickles, olives, soy sauce, and miso    · High-fat dairy foods  such as whole or 2% milk, cream cheese, or sour cream, and cheeses     · High-fat protein foods  such as high-fat cuts of beef (T-bone steaks, ribs), chicken or turkey with skin, and organ meats, such as liver    · Cured or smoked meats , such as hot dogs, roberts, and sausage    · Unhealthy fats and oils , such as butter, stick margarine, shortening, and cooking oils such as coconut or palm oil    · Food and drinks high in sugar , such as soft drinks (soda), sports drinks, sweetened tea, candy, cake, cookies, pies, and doughnuts  Other diet guidelines to follow:   · Eat more foods containing omega-3 fats  Eat fish high in omega-3 fats at least 2 times a week  · Limit alcohol  Too much alcohol can damage your heart and raise your blood pressure  Women should limit alcohol to 1 drink a day  Men should limit alcohol to 2 drinks a day  A drink of alcohol is 12 ounces of beer, 5 ounces of wine, or 1½ ounces of liquor  · Choose low-sodium foods  High-sodium foods can lead to high blood pressure  Add little or no salt to food you prepare  Use herbs and spices in place of salt  · Eat more fiber  to help lower cholesterol levels  Eat at least 5 servings of fruits and vegetables each day  Eat 3 ounces of whole-grain foods each day  Legumes (beans) are also a good source of fiber  Lifestyle guidelines:   · Do not smoke  Nicotine and other chemicals in cigarettes and cigars can cause lung and heart damage  Ask your healthcare provider for information if you currently smoke and need help to quit  E-cigarettes or smokeless tobacco still contain nicotine  Talk to your healthcare provider before you use these products  · Exercise regularly  to help you maintain a healthy weight and improve your blood pressure and cholesterol levels  Ask your healthcare provider about the best exercise plan for you  Do not start an exercise program without asking your healthcare provider     Follow up with your healthcare provider as directed:  Write down your questions so you remember to ask them during your visits  © 2017 [facility] Information is for End User's use only and may not be sold, redistributed or otherwise used for commercial purposes  All illustrations and images included in CareNotes® are the copyrighted property of [employer]  or [employer]  The above information is an  only  It is not intended as medical advice for individual conditions or treatments  Talk to your doctor, nurse or pharmacist before following any medical regimen to see if it is safe and effective for you

## 2020-01-22 NOTE — ASSESSMENT & PLAN NOTE
Lab Results   Component Value Date    HGBA1C 6 1 12/05/2019   BS running higher since taking chemo for multiple myeloma so will increase to 22 units

## 2020-01-23 ENCOUNTER — TELEPHONE (OUTPATIENT)
Dept: HEMATOLOGY ONCOLOGY | Facility: CLINIC | Age: 70
End: 2020-01-23

## 2020-01-23 ENCOUNTER — ANTICOAG VISIT (OUTPATIENT)
Dept: CARDIOLOGY CLINIC | Facility: CLINIC | Age: 70
End: 2020-01-23

## 2020-01-23 DIAGNOSIS — I48.20 CHRONIC ATRIAL FIBRILLATION (HCC): ICD-10-CM

## 2020-01-23 LAB — INR PPP: 1.4 (ref 0.84–1.19)

## 2020-01-23 NOTE — TELEPHONE ENCOUNTER
Patient called to confirm their appointment  Appointment confirmed for [facility]               Appointment date and time: 1-27-20 @ 11:40               [place] location:  [facility]              Patient verbalized understanding of above

## 2020-01-23 NOTE — PROGRESS NOTES
1/23/20, tc to wellness centre, spoke with kimberley, she reports patient missed 10 mg dose on 1/21  Will increase dose, 10 mg tues/th, 7 5 mg other days of the week, inr due 1 week  Faxed to [facility] center   kathya

## 2020-01-24 ENCOUNTER — TELEPHONE (OUTPATIENT)
Dept: OTHER | Facility: OTHER | Age: 70
End: 2020-01-24

## 2020-01-27 ENCOUNTER — OFFICE VISIT (OUTPATIENT)
Dept: HEMATOLOGY ONCOLOGY | Facility: HOSPITAL | Age: 70
End: 2020-01-27
Payer: COMMERCIAL

## 2020-01-27 ENCOUNTER — TELEPHONE (OUTPATIENT)
Dept: OTHER | Facility: OTHER | Age: 70
End: 2020-01-27

## 2020-01-27 ENCOUNTER — HOSPITAL ENCOUNTER (OUTPATIENT)
Dept: INFUSION CENTER | Facility: HOSPITAL | Age: 70
Discharge: HOME | End: 2020-01-27
Attending: INTERNAL MEDICINE
Payer: COMMERCIAL

## 2020-01-27 ENCOUNTER — DOCUMENTATION (OUTPATIENT)
Dept: FAMILY MEDICINE CLINIC | Facility: CLINIC | Age: 70
End: 2020-01-27

## 2020-01-27 VITALS
OXYGEN SATURATION: 97 % | BODY MASS INDEX: 39.17 KG/M2 | HEART RATE: 76 BPM | WEIGHT: 315 LBS | RESPIRATION RATE: 16 BRPM | HEIGHT: 75 IN | SYSTOLIC BLOOD PRESSURE: 118 MMHG | TEMPERATURE: 98.5 F | DIASTOLIC BLOOD PRESSURE: 62 MMHG

## 2020-01-27 VITALS
WEIGHT: 315 LBS | RESPIRATION RATE: 18 BRPM | SYSTOLIC BLOOD PRESSURE: 138 MMHG | TEMPERATURE: 98.4 F | HEART RATE: 65 BPM | BODY MASS INDEX: 39.17 KG/M2 | OXYGEN SATURATION: 97 % | DIASTOLIC BLOOD PRESSURE: 80 MMHG | HEIGHT: 75 IN

## 2020-01-27 DIAGNOSIS — C90.00 MULTIPLE MYELOMA NOT HAVING ACHIEVED REMISSION (HCC): Primary | Chronic | ICD-10-CM

## 2020-01-27 DIAGNOSIS — C90.00 MULTIPLE MYELOMA NOT HAVING ACHIEVED REMISSION (HCC): Primary | ICD-10-CM

## 2020-01-27 PROCEDURE — 96401 CHEMO ANTI-NEOPL SQ/IM: CPT

## 2020-01-27 PROCEDURE — 99214 OFFICE O/P EST MOD 30 MIN: CPT | Performed by: INTERNAL MEDICINE

## 2020-01-27 RX ORDER — DEXAMETHASONE 4 MG/1
20 TABLET ORAL ONCE
Status: COMPLETED | OUTPATIENT
Start: 2020-01-27 | End: 2020-01-27

## 2020-01-27 RX ORDER — DEXAMETHASONE 4 MG/1
20 TABLET ORAL ONCE
Status: CANCELLED
Start: 2020-02-24

## 2020-01-27 RX ORDER — DEXAMETHASONE 4 MG/1
20 TABLET ORAL ONCE
Status: CANCELLED
Start: 2020-03-02

## 2020-01-27 RX ORDER — DEXAMETHASONE 4 MG/1
20 TABLET ORAL ONCE
Status: CANCELLED
Start: 2020-02-11

## 2020-01-27 RX ORDER — DEXAMETHASONE 4 MG/1
20 TABLET ORAL ONCE
Status: CANCELLED
Start: 2020-02-17

## 2020-01-27 RX ADMIN — BORTEZOMIB 3.5 MG: 3.5 INJECTION, POWDER, LYOPHILIZED, FOR SOLUTION INTRAVENOUS; SUBCUTANEOUS at 13:37

## 2020-01-27 RX ADMIN — DEXAMETHASONE 20 MG: 4 TABLET ORAL at 13:13

## 2020-01-27 NOTE — PLAN OF CARE
Problem: Potential for Falls  Goal: Patient will remain free of falls  Description  INTERVENTIONS:  - Assess patient frequently for physical needs  -  Identify cognitive and physical deficits and behaviors that affect risk of falls  -  [place] fall precautions as indicated by assessment   - Educate patient/family on patient safety including physical limitations  - Instruct patient to call for assistance with activity based on assessment  - Modify environment to reduce risk of injury  - Consider OT/PT consult to assist with strengthening/mobility  Outcome: Progressing     Problem: Knowledge Deficit  Goal: Patient/family/caregiver demonstrates understanding of disease process, treatment plan, medications, and discharge instructions  Description  Complete learning assessment and assess knowledge base    Interventions:  - Provide teaching at level of understanding  - Provide teaching via preferred learning methods  Outcome: Progressing

## 2020-01-27 NOTE — PROGRESS NOTES
Hematology/Oncology Outpatient Follow- up Note  Shahab Taveras 81 y o  male MRN: @ Encounter: [id number]        Date:  1/27/2020    Presenting Complaint/Diagnosis :      Biclonal gammopathy with IgG Kappa  Bone marrow biopsy revealed multiple myeloma    HPI:      The patient was admitted to the hospital with wound infection  [facility] has a history of DM, HTN, and several other comorbid conditions   He seems to have had a slightly elevated creatinine for about the past year, seems to be slightly increasing more recently  He has also been anemic for quiet some time  Skeletal survey was negative but we did a bone marrow biopsy which showed plasma cell neoplasm consistent with myeloma so he was referred to see us  Previous Hematologic/ Oncologic History:       Multiple myeloma ([email or web address] )    9/16/2019 Initial Diagnosis     Multiple myeloma not having achieved remission ([email or web address] )      9/24/2019 -  Chemotherapy     iron sucrose (VENOFER) injection 200 mg, 200 mg (100 % of original dose 200 mg), Intravenous, Once, 1 of 1 cycle  Dose modification: 200 mg (original dose 200 mg, Cycle 1, Reason: Other (See Comments))  Administration: 200 mg (9/24/2019)  bortezomib (VELCADE) subcutaneous injection 3 5 mg, 1 3 mg/m2 = 3 5 mg (81 3 % of original dose 1 6 mg/m2), Subcutaneous, Once, 2 of 6 cycles  Dose modification: 1 3 mg/m2 (original dose 1 6 mg/m2, Cycle 1, Reason: Dose Not Tolerated)  Administration: 3 5 mg (9/24/2019), 3 5 mg (10/1/2019), 3 5 mg (10/8/2019), 3 5 mg (10/15/2019), 3 5 mg (1/6/2020), 3 5 mg (1/13/2020), 3 5 mg (1/20/2020)         Current Hematologic/ Oncologic Treatment:      Velcade and Decadron    Interval History:      The patient returns for follow-up visit  He seems to be tolerating treatment very well  States his blood sugars spike for a day or 2 but then this resolves  He denies any complaints today  His myeloma blood work appears to be improving  Kidney function and hemoglobin are stable   The patient himself is at baseline  Denies any nausea denies any vomiting and is in diarrhea  The rest of his 14 point review of systems today was negative  Test Results:    Imaging: No results found  Labs:   Lab Results   Component Value Date    WBC 9 67 01/17/2020    HGB 12 9 01/17/2020    HCT 42 1 01/17/2020    MCV 89 01/17/2020     01/17/2020     Lab Results   Component Value Date     01/15/2018    K 3 9 01/17/2020     01/17/2020    CO2 29 01/17/2020    ANIONGAP 11 12/22/2015    BUN 38 (H) 01/17/2020    CREATININE 1 28 01/17/2020    GLUCOSE 139 12/22/2015    GLUF 141 (H) 02/26/2019    CALCIUM 9 0 01/17/2020    AST 9 01/17/2020    ALT 24 01/17/2020    ALKPHOS 154 (H) 01/17/2020    PROT 7 1 01/15/2018    BILITOT 0 6 01/15/2018    EGFR 58 01/17/2020         Lab Results   Component Value Date    SPEP  01/06/2020     The SPEP shows a biclonal gammopathy  Previous immunofixation on 8/05/19 identified two IgA kappa bands  Reviewed by: Sherley Cannon MD  ([id number])  **Electronic Signature**    UPEP  08/04/2019     The UPEP shows non-selective proteinuria  The UPEP shows a monoclonal gammopathy  Immunofixation to be performed  Reviewed by: Karely Arambula MD **Electronic Signature**       Lab Results   Component Value Date    IRON 49 (L) 11/01/2019    TIBC 254 11/01/2019    FERRITIN 824 (H) 11/01/2019     ROS: As stated in the history of present illness otherwise his 14 point review of systems today was negative        Active Problems:   Patient Active Problem List   Diagnosis    Diabetic foot ulcer ([address] )    Diabetes mellitus type 2, uncontrolled ([address] )    Chronic venous hypertension, right    Essential hypertension    Chronic atrial fibrillation    Morbid obesity ([address] )    Acute on chronic diastolic congestive heart failure (HCC)    Cardiomyopathy ([address] )    Diabetes, polyneuropathy ([address] )    GERD (gastroesophageal reflux disease)    Hyperlipidemia    Onychomycosis    Gallstones    Localized edema    Peripheral vascular disease (HCC)    SOB (shortness of breath)    NALLELY (obstructive sleep apnea)    Moderate persistent asthma without complication    Multiple myeloma (HCC)    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)    Hiatal hernia    Weakness    Elevated troponin    Chronic obstructive pulmonary disease, unspecified (HCC)    Hypertensive chronic kidney disease w stg 1-4/unsp chr kdny    Mass of psoas muscle    CKD (chronic kidney disease)    Chronic diastolic (congestive) heart failure (HCC)    Lymphedema    Rash       Past Medical History:   Past Medical History:   Diagnosis Date    Cancer ([facility] )     CHF (congestive heart failure) (HCC)     Chronic kidney disease     COPD (chronic obstructive pulmonary disease) (HCC)     Decubitus ulcer of heel     LAST ASSESSED [id number]    Diabetes mellitus [facility] )     History of varicose veins     Hypertension     Intermittent claudication (HCC)     Neuropathy     Seasonal allergies        Surgical History:   Past Surgical History:   Procedure Laterality Date    AMPUTATION ABOVE KNEE (AKA) Left 7/31/2019    Procedure: AMPUTATION ABOVE KNEE (AKA);   Surgeon: Lakisha Ashley MD;  Location: [facility];  Service: General    ANGIOPLASTY      W/ FLUOROSC ANGIOGRAPGY PERIPHERAL ARTERY ADDITIONAL  LAST ASSESSED [id number]    ANGIOPLASTY / STENTING FEMORAL      TANSCATH INTRAVASCULAR STENT PLACEMENT PERCUTANEOUS FEMORAL     CT BONE MARROW BIOPSY AND ASPIRATION  8/9/2019    FULL THICKNESS SKIN GRAFT      TENDON REPAIR      TOE AMPUTATION Left 12/27/2018    Procedure: AMPUTATION left 4th TOE;  Surgeon: Jocy Coronel DPM;  Location: [facility];  Service: Podiatry       Family History:    Family History   Problem Relation Age of Onset    Other Mother         CARDIAC DISORDER     Diabetes Mother     Cancer Father     Other Family         CARDIAC DISORDER     Diabetes Family     Cancer Family     Mental illness Family     Kidney disease Sister        Cancer-related family history includes Cancer in his family and father      Social History:   Social History     Socioeconomic History    Marital status:      Spouse name: Not on file    Number of children: 1    Years of education: Not on file    Highest education level: Not on file   Occupational History    Occupation: RETIRED   Social Needs    Financial resource strain: Not on file    Food insecurity:     Worry: Not on file     Inability: Not on file   [email or web address] needs:     Medical: Not on file     Non-medical: Not on file   Tobacco Use    Smoking status: Never Smoker    Smokeless tobacco: Never Used   Substance and Sexual Activity    Alcohol use: Not Currently    Drug use: Not Currently    Sexual activity: Not Currently   Lifestyle    Physical activity:     Days per week: Not on file     Minutes per session: Not on file    Stress: Not on file   Relationships    Social connections:     Talks on phone: Not on file     Gets together: Not on file     Attends Zoroastrian service: Not on file     Active member of club or organization: Not on file     Attends meetings of clubs or organizations: Not on file     Relationship status: Not on file    Intimate partner violence:     Fear of current or ex partner: Not on file     Emotionally abused: Not on file     Physically abused: Not on file     Forced sexual activity: Not on file   Other Topics Concern    Not on file   Social History Narrative    DENIED [address]    FEELS SAFE AT HOME    LIVES WITH FAMILY - SISTER    NO LIVING WILL    POA IN EXISTENCE     SUPPORTIVE AND SAFE    One son, 2 granddaughters       Current Medications:   Current Outpatient Medications   Medication Sig Dispense Refill    acetaminophen (TYLENOL) 500 mg tablet Take 1 tablet (500 mg total) by mouth every 6 (six) hours as needed for mild pain, headaches or fever 90 tablet 1    acyclovir (ZOVIRAX) 400 MG tablet Take 1 tablet (400 mg total) by mouth daily 30 tablet 3    albuterol (PROVENTIL HFA,VENTOLIN HFA) 90 mcg/act inhaler Inhale 2 puffs every 6 (six) hours as needed for wheezing 1 Inhaler 0    Alcohol Swabs (ALCOHOL PREP) 70 % PADS   0    allopurinol (ZYLOPRIM) 300 mg tablet TAKE 1 TABLET BY MOUTH DAILY 90 tablet 1    ammonium lactate (LAC-HYDRIN) 12 % lotion   99    atorvastatin (LIPITOR) 20 mg tablet TAKE 1 TABLET BY MOUTH ONCE DAILY 90 tablet 0    BD INSULIN SYRINGE U/F 31G X 5/16" 0 5 ML MISC USE AS DIRECTED WITH NOVOLIN 70/30  0    BD PEN NEEDLE HILARIO U/F 32G X 4 MM MISC by Other route 3 (three) times a day 300 each 1    bisacodyl (bisacodyl) 5 mg EC tablet Take 5 mg by mouth daily as needed for constipation      bisacodyl (DULCOLAX) 10 mg suppository Insert 10 mg into the rectum as needed for constipation      bortezomib (VELCADE) Infuse into a venous catheter once      clotrimazole (LOTRIMIN) 1 % cream APPLY TO BUTTOCK 2 TIMES DAILY 45 g 3    fluticasone-salmeterol (ADVAIR DISKUS, WIXELA INHUB) 250-50 mcg/dose inhaler Inhale 1 puff 2 (two) times a day Rinse mouth after use   1 Inhaler 5    insulin glargine (LANTUS SOLOSTAR) 100 units/mL injection pen Inject 22 Units under the skin daily 5 pen 5    insulin lispro (HumaLOG) 100 units/mL injection Inject 5 Units under the skin 3 (three) times a day with meals  0    magnesium hydroxide (MILK OF MAGNESIA) 400 mg/5 mL oral suspension Take 30 mL by mouth daily as needed for constipation      Melatonin 5 MG TABS Take by mouth      metFORMIN (GLUCOPHAGE) 1000 MG tablet       metoprolol tartrate (LOPRESSOR) 25 mg tablet TAKE 1 TABLET BY MOUTH EVERY 12 HOURS 180 tablet 3    torsemide (DEMADEX) 10 mg tablet Take 1 tablet (10 mg total) by mouth 2 (two) times a day (Patient taking differently: Take 20 mg by mouth 2 (two) times a day )      triamcinolone (KENALOG) 0 1 % cream Apply topically 2 (two) times a day 30 g 0    warfarin (COUMADIN) 7 5 mg tablet 7 5 mg on August 12th and 13th then INR on August 14th (Patient taking differently: Take 10 mg by mouth ) 30 tablet 0    Insulin Pen Needle 31G X 5 MM MISC by Does not apply route 2 (two) times a day for 50 days 100 each 0     No current facility-administered medications for this visit  Allergies: Allergies   Allergen Reactions    Latex Rash       Physical Exam:    Body surface area is 2 73 meters squared  Wt Readings from Last 3 Encounters:   01/27/20 (!) 152 kg (335 lb)   01/22/20 (!) 152 kg (335 lb)   01/20/20 (!) 153 kg (338 lb 3 oz)        Temp Readings from Last 3 Encounters:   01/27/20 98 5 °F (36 9 °C) (Tympanic)   01/22/20 97 5 °F (36 4 °C) (Oral)   01/20/20 98 1 °F (36 7 °C) (Temporal)        BP Readings from Last 3 Encounters:   01/27/20 118/62   01/22/20 132/72   01/20/20 135/68         Pulse Readings from Last 3 Encounters:   01/27/20 76   01/22/20 74   01/20/20 72       Physical Exam     Constitutional   General appearance: No acute distress, well appearing and well nourished  Eyes   Conjunctiva and lids: No swelling, erythema or discharge  Pupils and irises: Equal, round and reactive to light  Ears, Nose, Mouth, and Throat   External inspection of ears and nose: Normal     Nasal mucosa, septum, and turbinates: Normal without edema or erythema  Oropharynx: Normal with no erythema, edema, exudate or lesions  Pulmonary   Respiratory effort: No increased work of breathing or signs of respiratory distress  Auscultation of lungs: Clear to auscultation  Cardiovascular   Palpation of heart: Normal PMI, no thrills  Auscultation of heart: Normal rate and rhythm, normal S1 and S2, without murmurs  Examination of extremities for edema and/or varicosities: Normal     Carotid pulses: Normal     Abdomen   Abdomen: Non-tender, no masses  Liver and spleen: No hepatomegaly or splenomegaly      Lymphatic   Palpation of lymph nodes in neck: No lymphadenopathy  Musculoskeletal   Gait and station: Normal     Digits and nails: Normal without clubbing or cyanosis  Inspection/palpation of joints, bones, and muscles: Normal     Skin   Skin and subcutaneous tissue: Normal without rashes or lesions  Neurologic   Cranial nerves: Cranial nerves 2-12 intact  Sensation: No sensory loss  Psychiatric   Orientation to person, place, and time: Normal     Mood and affect: Normal         Assessment / Plan:    The patient is a 53-year-old male with multiple myeloma   He does have multiple comorbidities including diabetes mellitus and hypertension   We were consulted in August 2019 while he was inpatient due to recent labs revealing by clonal gammopathies with IgG Contre[place] Arms also does have elevated kidney function and anemia which warranted a workup for multiple myeloma   His skeletal survey was negative, however his bone marrow biopsy did confirm multiple myeloma so he was started on treatment with Velcade and Decadron weekly on a 35 day schedule   Velcade is 1 3 milligrams/meter squared and Decadron is 20 mg p o  On days 1, 8, 15, 22  He is finishing up his first cycle  His blood work shows improvement in his myeloma levels  At this point I will continue him on his current treatment with no change  I'll see him back in 2 months  He will have myeloma blood work repeated in 2 months  He will continue to get blood work every week prior to treatment  The patient is in agreement with the plan  Goals and Barriers:  Current Goal:  Prolong Survival from Myeloma  Barriers: None  Patient's Capacity to Self Care:  Patient able to self care      Portions of the record may have been created with voice recognition software   Occasional wrong word or "sound a like" substitutions may have occurred due to the inherent limitations of voice recognition software   Read the chart carefully and recognize, using context, where substitutions have occurred

## 2020-01-27 NOTE — PROGRESS NOTES
Pt here for Velcade; VSS; given in the R abd with no adverse reactions; bandaid dry and intact; pt d/c'd home in w[place] with

## 2020-01-28 NOTE — PROGRESS NOTES
Received call from She Huang at [facility]  Patients blood sugar is elevated  Was 500  Nursing stated it was elevated after dialysis  Based on this, I would not adjust meds  He is on Lantus only per nsg  Again: no adjustment  Reviewed chart: has had issues with high blood sugars after chemo treatments, and would recommend follow with Dr Hillary Fofana

## 2020-01-28 NOTE — TELEPHONE ENCOUNTER
Tiger Text Dr Neptali Diez @ [phone]    Please call She Huang from [facility] regarding pt Molly Floor  1950  Pt has a high BS   Please call back    Please contact us back in 20-30 mins if you do not hear back from Dr Neptali Diez

## 2020-01-29 ENCOUNTER — ANTICOAG VISIT (OUTPATIENT)
Dept: CARDIOLOGY CLINIC | Facility: CLINIC | Age: 70
End: 2020-01-29

## 2020-01-29 DIAGNOSIS — I48.20 CHRONIC ATRIAL FIBRILLATION (HCC): ICD-10-CM

## 2020-01-29 LAB — INR PPP: 1.2 (ref 0.84–1.19)

## 2020-01-29 NOTE — PROGRESS NOTES
Phone call to wellness center, left message, questioning if patient has missed doses, or medication changes  Will increase dose, 10 mh m/w/f, 7 5 mg other days of the week, inr due 1 week, faxed to wellness center   kathya

## 2020-01-30 ENCOUNTER — OFFICE VISIT (OUTPATIENT)
Dept: NEPHROLOGY | Facility: CLINIC | Age: 70
End: 2020-01-30
Payer: COMMERCIAL

## 2020-01-30 ENCOUNTER — TELEPHONE (OUTPATIENT)
Dept: HEMATOLOGY ONCOLOGY | Facility: CLINIC | Age: 70
End: 2020-01-30

## 2020-01-30 VITALS — HEART RATE: 63 BPM | SYSTOLIC BLOOD PRESSURE: 138 MMHG | DIASTOLIC BLOOD PRESSURE: 82 MMHG | RESPIRATION RATE: 16 BRPM

## 2020-01-30 DIAGNOSIS — I12.9 HYPERTENSION ASSOCIATED WITH STAGE 2 CHRONIC KIDNEY DISEASE DUE TO TYPE 2 DIABETES MELLITUS (HCC): Primary | ICD-10-CM

## 2020-01-30 DIAGNOSIS — E11.22 HYPERTENSION ASSOCIATED WITH STAGE 2 CHRONIC KIDNEY DISEASE DUE TO TYPE 2 DIABETES MELLITUS (HCC): Primary | ICD-10-CM

## 2020-01-30 DIAGNOSIS — N18.2 HYPERTENSION ASSOCIATED WITH STAGE 2 CHRONIC KIDNEY DISEASE DUE TO TYPE 2 DIABETES MELLITUS (HCC): Primary | ICD-10-CM

## 2020-01-30 DIAGNOSIS — I50.33 ACUTE ON CHRONIC DIASTOLIC CONGESTIVE HEART FAILURE (HCC): Chronic | ICD-10-CM

## 2020-01-30 PROCEDURE — 4040F PNEUMOC VAC/ADMIN/RCVD: CPT | Performed by: INTERNAL MEDICINE

## 2020-01-30 PROCEDURE — 99214 OFFICE O/P EST MOD 30 MIN: CPT | Performed by: INTERNAL MEDICINE

## 2020-01-30 PROCEDURE — 3066F NEPHROPATHY DOC TX: CPT | Performed by: NURSE PRACTITIONER

## 2020-01-30 NOTE — PATIENT INSTRUCTIONS
1  CKD stage 2/3 in setting of DM/HTN/CHF - b/l sCr 1-1 1, but with more recent sCr 1 28 as of 1/17/20 -I have concern his diabetes, myeloma and HTN are contributing factors  I suspect 1 3-1 4 may be new baseline   -repeat BMP in 3 months and again in 6 months before next appointment    -avoid nonsteroidals(ibuprofen, aleve, advil, motrin)  -avoid fleet enema in setting of CKD history  -repeat UA with microscopy and urine protein:Cr in 6 months     2  Azotemia - resolving     3  Chronic dCHF - EF 60%, on torsemide 20mg twice daily  Monitor daily weight       4  DM2, uncontrolled - last A1C 6 1 December 2019  Insulin per primary team  No repeat A1C in system       5  HTN - BP acceptable on metoprolol 25mg every 12 hours  Also on torsemide as above  Ideally, goal SBP 130s for renal perfusion      RTC in 6 months  Obtain bloodwork in 3 months and again in 6 months as above

## 2020-01-30 NOTE — TELEPHONE ENCOUNTER
Patricia from [facility] wants a call back in regards to PT chemo dates  They would divya to change a few of them      Please call her back at [phone]

## 2020-01-30 NOTE — PROGRESS NOTES
NEPHROLOGY OUTPATIENT PROGRESS NOTE   Malika Luther 81 y o  male MRN: [id number]  DATE: 1/30/2020  Reason for visit:   Chief Complaint   Patient presents with    Chronic Kidney Disease    Follow-up        Patient Instructions   1  CKD stage 2/3 in setting of DM/HTN/CHF - b/l sCr 1-1 1, but with more recent sCr 1 28 as of 1/17/20 -I have concern his diabetes, myeloma and HTN are contributing factors  I suspect 1 3-1 4 may be new baseline   -repeat BMP in 3 months and again in 6 months before next appointment    -avoid nonsteroidals(ibuprofen, aleve, advil, motrin)  -avoid fleet enema in setting of CKD history  -repeat UA with microscopy and urine protein:Cr in 6 months     2  Azotemia - resolving     3  Chronic dCHF - EF 60%, on torsemide 20mg twice daily  Monitor daily weight       4  DM2, uncontrolled - last A1C 6 1 December 2019  Insulin per primary team  No repeat A1C in system       5  HTN - BP acceptable on metoprolol 25mg every 12 hours  Also on torsemide as above  Ideally, goal SBP 130s for renal perfusion      RTC in 6 months  Obtain bloodwork in 3 months and again in 6 months as above  Michael Hutton was seen today for chronic kidney disease and follow-up  Diagnoses and all orders for this visit:    Hypertension associated with stage 2 chronic kidney disease due to type 2 diabetes mellitus ([facility] [address] )  -     Basic metabolic panel; Future  -     Basic metabolic panel; Future  -     Urinalysis with microscopic; Future  -     Protein / creatinine ratio, urine; Future    Acute on chronic diastolic congestive heart failure (HCC)        Assessment/Plan:  1  CKD stage 2/3 in setting of DM/HTN/CHF - b/l sCr 1-1 1, but with more recent sCr 1 28 as of 1/17/20 -I have concern his diabetes, myeloma and HTN are contributing factors   I suspect 1 3-1 4 may be new baseline   -repeat BMP in 3 months and again in 6 months before next appointment    -avoid nonsteroidals(ibuprofen, aleve, advil, motrin)  -avoid fleet enema in setting of CKD history  -repeat UA with microscopy and urine protein:Cr in 6 months     2  Azotemia - resolving     3  Chronic dCHF - EF 60%, on torsemide 20mg twice daily  Monitor daily weight       4  DM2, uncontrolled - last A1C 6 1 December 2019  Insulin per primary team  No repeat A1C in system       5  HTN - BP acceptable on metoprolol 25mg every 12 hours  Also on torsemide as above  Ideally, goal SBP 130s for renal perfusion      RTC in 6 months  Obtain bloodwork in 3 months and again in 6 months as above  SUBJECTIVE / INTERVAL HISTORY:  81 y o  male presents in follow up of CKD  [address] admitted in December 2019 for sepsis due to cellulitis, s/p L AKA  Denies NSAID use  DM2 - blood sugars high during chemo into the 500s, and then improves to the 250s  Average is usually around 200  HTN - BP has been well controlled  CHF - weight hasn't changed much  Denies leg edema  Receiving chemotherapy for multiple myeloma  Review of Systems   Constitutional: Negative for chills and fever  HENT: Negative for sore throat  Eyes: Negative for visual disturbance  Respiratory: Positive for shortness of breath (chronic)  Negative for cough  Cardiovascular: Positive for leg swelling (as per HPI)  Negative for chest pain  Gastrointestinal: Negative for abdominal pain, constipation, diarrhea, nausea and vomiting  Endocrine: Negative for polyuria  Genitourinary: Positive for difficulty urinating (difficulty with stream) and frequency  Negative for decreased urine volume, dysuria and hematuria  Musculoskeletal: Negative for back pain and myalgias  Skin: Negative for rash  Neurological: Negative for dizziness, light-headedness and numbness  Psychiatric/Behavioral: Negative for confusion         OBJECTIVE:  /82 (BP Location: Left arm, Patient Position: Sitting, Cuff Size: Large)   Pulse 63   Resp 16  There is no height or weight on file to calculate BMI  Physical exam:  Physical Exam   Constitutional: He appears well-developed and well-nourished  No distress  In wheelchair, obese   HENT:   Head: Normocephalic and atraumatic  Mouth/Throat: No oropharyngeal exudate  Eyes: Right eye exhibits no discharge  Left eye exhibits no discharge  No scleral icterus  Neck: Neck supple  No thyromegaly present  Cardiovascular: Normal rate, regular rhythm and normal heart sounds  Pulmonary/Chest: Effort normal and breath sounds normal  He has no wheezes  He has no rales  Abdominal: Soft  Bowel sounds are normal  He exhibits no distension  There is no tenderness  Musculoskeletal: Normal range of motion  He exhibits edema (trace to 1+ RLE, s/p L AKA)  Neurological: He is alert  awake   Skin: Skin is warm and dry  No rash noted  He is not diaphoretic  Psychiatric: He has a normal mood and affect  His behavior is normal    Vitals reviewed        Medications:    Current Outpatient Medications:     acetaminophen (TYLENOL) 500 mg tablet, Take 1 tablet (500 mg total) by mouth every 6 (six) hours as needed for mild pain, headaches or fever, Disp: 90 tablet, Rfl: 1    acyclovir (ZOVIRAX) 400 MG tablet, Take 1 tablet (400 mg total) by mouth daily, Disp: 30 tablet, Rfl: 3    albuterol (PROVENTIL HFA,VENTOLIN HFA) 90 mcg/act inhaler, Inhale 2 puffs every 6 (six) hours as needed for wheezing, Disp: 1 Inhaler, Rfl: 0    Alcohol Swabs (ALCOHOL PREP) 70 % PADS, , Disp: , Rfl: 0    allopurinol (ZYLOPRIM) 300 mg tablet, TAKE 1 TABLET BY MOUTH DAILY, Disp: 90 tablet, Rfl: 1    ammonium lactate (LAC-HYDRIN) 12 % lotion, , Disp: , Rfl: 99    atorvastatin (LIPITOR) 20 mg tablet, TAKE 1 TABLET BY MOUTH ONCE DAILY, Disp: 90 tablet, Rfl: 0    BD INSULIN SYRINGE U/F 31G X 5/16" 0 5 ML MISC, USE AS DIRECTED WITH NOVOLIN 70/30, Disp: , Rfl: 0    BD PEN NEEDLE HILARIO U/F 32G X 4 MM MISC, by Other route 3 (three) times a day, Disp: 300 each, Rfl: 1    bisacodyl (bisacodyl) 5 mg EC tablet, Take 5 mg by mouth daily as needed for constipation, Disp: , Rfl:     bisacodyl (DULCOLAX) 10 mg suppository, Insert 10 mg into the rectum as needed for constipation, Disp: , Rfl:     clotrimazole (LOTRIMIN) 1 % cream, APPLY TO BUTTOCK 2 TIMES DAILY, Disp: 45 g, Rfl: 3    fluticasone-salmeterol (ADVAIR DISKUS, WIXELA INHUB) 250-50 mcg/dose inhaler, Inhale 1 puff 2 (two) times a day Rinse mouth after use , Disp: 1 Inhaler, Rfl: 5    insulin glargine (LANTUS SOLOSTAR) 100 units/mL injection pen, Inject 22 Units under the skin daily, Disp: 5 pen, Rfl: 5    insulin lispro (HumaLOG) 100 units/mL injection, Inject 5 Units under the skin 3 (three) times a day with meals, Disp: , Rfl: 0    magnesium hydroxide (MILK OF MAGNESIA) 400 mg/5 mL oral suspension, Take 30 mL by mouth daily as needed for constipation, Disp: , Rfl:     Melatonin 5 MG TABS, Take by mouth, Disp: , Rfl:     metFORMIN (GLUCOPHAGE) 1000 MG tablet, Take 1,000 mg by mouth 2 (two) times a day with meals , Disp: , Rfl:     metoprolol tartrate (LOPRESSOR) 25 mg tablet, TAKE 1 TABLET BY MOUTH EVERY 12 HOURS, Disp: 180 tablet, Rfl: 3    torsemide (DEMADEX) 10 mg tablet, Take 1 tablet (10 mg total) by mouth 2 (two) times a day (Patient taking differently: Take 20 mg by mouth 2 (two) times a day ), Disp: , Rfl:     triamcinolone (KENALOG) 0 1 % cream, Apply topically 2 (two) times a day, Disp: 30 g, Rfl: 0    warfarin (COUMADIN) 7 5 mg tablet, 7 5 mg on August 12th and 13th then INR on August 14th (Patient taking differently: Take 10 mg by mouth ), Disp: 30 tablet, Rfl: 0    bortezomib (VELCADE), Infuse into a venous catheter once, Disp: , Rfl:     Insulin Pen Needle 31G X 5 MM MISC, by Does not apply route 2 (two) times a day for 50 days, Disp: 100 each, Rfl: 0    Allergies:   Allergies as of 01/30/2020 - Reviewed 01/30/2020   Allergen Reaction Noted    Latex Rash 09/15/2016       The following portions of the patient's history were reviewed and updated as appropriate: past family history, past surgical history and problem list     Laboratory Results:  Lab Results   Component Value Date    SODIUM 141 01/17/2020    K 3 9 01/17/2020     01/17/2020    CO2 29 01/17/2020    BUN 38 (H) 01/17/2020    CREATININE 1 28 01/17/2020    GLUC 239 (H) 01/17/2020    CALCIUM 9 0 01/17/2020        Lab Results   Component Value Date    CALCIUM 9 0 01/17/2020    PHOS 3 7 12/05/2019       Portions of the record may have been created with voice recognition software   Occasional wrong word or "sound a like" substitutions may have occurred due to the inherent limitations of voice recognition software   Read the chart carefully and recognize, using context, where substitutions have occurred

## 2020-01-31 NOTE — TELEPHONE ENCOUNTER
Brandon Sauer at [facility] and she gave me the dates that were an issue  I rescheduled accordingly  Called her back and lvm for a return call  She returned my call we reviewed the changes (4 dates, 3 I was able to change) She thanked me for the assistance and would work it out on her end for transportation  I told her to call our call center should there be an issue and they would direct her call appropriately as I am out all next week   Verbalized understanding and very appreciative of my help

## 2020-02-05 ENCOUNTER — ANTICOAG VISIT (OUTPATIENT)
Dept: CARDIOLOGY CLINIC | Facility: CLINIC | Age: 70
End: 2020-02-05

## 2020-02-05 DIAGNOSIS — I48.20 CHRONIC ATRIAL FIBRILLATION (HCC): ICD-10-CM

## 2020-02-05 LAB — INR PPP: 1.6 (ref 0.84–1.19)

## 2020-02-05 NOTE — PROGRESS NOTES
2/5/20, tc to nino at [facility], increased dose, 10 mg mon/we/fri/sat, 7 5 mg other days of the week, inr due 1 week  Faxed same to [facility]

## 2020-02-07 DIAGNOSIS — G47.00 INSOMNIA, UNSPECIFIED TYPE: ICD-10-CM

## 2020-02-07 DIAGNOSIS — E11.641 UNCONTROLLED TYPE 2 DIABETES MELLITUS WITH HYPOGLYCEMIA AND COMA (HCC): Primary | ICD-10-CM

## 2020-02-07 RX ORDER — CHOLECALCIFEROL (VITAMIN D3) 125 MCG
CAPSULE ORAL
Qty: 30 TABLET | Refills: 2 | Status: SHIPPED | OUTPATIENT
Start: 2020-02-07 | End: 2020-04-16

## 2020-02-11 ENCOUNTER — HOSPITAL ENCOUNTER (OUTPATIENT)
Dept: INFUSION CENTER | Facility: HOSPITAL | Age: 70
Discharge: HOME | End: 2020-02-11
Attending: INTERNAL MEDICINE
Payer: COMMERCIAL

## 2020-02-11 ENCOUNTER — TELEPHONE (OUTPATIENT)
Dept: OTHER | Facility: OTHER | Age: 70
End: 2020-02-11

## 2020-02-11 VITALS
HEART RATE: 70 BPM | BODY MASS INDEX: 39.17 KG/M2 | HEIGHT: 75 IN | TEMPERATURE: 99.4 F | SYSTOLIC BLOOD PRESSURE: 124 MMHG | OXYGEN SATURATION: 95 % | DIASTOLIC BLOOD PRESSURE: 58 MMHG | RESPIRATION RATE: 18 BRPM | WEIGHT: 315 LBS

## 2020-02-11 DIAGNOSIS — E11.641 UNCONTROLLED TYPE 2 DIABETES MELLITUS WITH HYPOGLYCEMIA AND COMA (HCC): Primary | ICD-10-CM

## 2020-02-11 DIAGNOSIS — C90.00 MULTIPLE MYELOMA NOT HAVING ACHIEVED REMISSION (HCC): Primary | Chronic | ICD-10-CM

## 2020-02-11 PROCEDURE — 96401 CHEMO ANTI-NEOPL SQ/IM: CPT

## 2020-02-11 RX ORDER — DEXAMETHASONE 4 MG/1
20 TABLET ORAL ONCE
Status: COMPLETED | OUTPATIENT
Start: 2020-02-11 | End: 2020-02-11

## 2020-02-11 RX ORDER — PEN NEEDLE, DIABETIC, SAFETY 30 GX3/16"
NEEDLE, DISPOSABLE MISCELLANEOUS
Qty: 100 EACH | Refills: 3 | Status: SHIPPED | OUTPATIENT
Start: 2020-02-11 | End: 2020-05-09

## 2020-02-11 RX ADMIN — DEXAMETHASONE 20 MG: 4 TABLET ORAL at 14:14

## 2020-02-11 RX ADMIN — BORTEZOMIB 3.5 MG: 3.5 INJECTION, POWDER, LYOPHILIZED, FOR SOLUTION INTRAVENOUS; SUBCUTANEOUS at 14:31

## 2020-02-11 NOTE — PROGRESS NOTES
Pt here for Velcade  Assisted to bed with large walker, pt maria g  PO Decadron given  Velcade given SQ Lt side of abdomen  Pt cristhian well  disch via w/c to facility with

## 2020-02-11 NOTE — PLAN OF CARE
Problem: Potential for Falls  Goal: Patient will remain free of falls  Description  INTERVENTIONS:  - Assess patient frequently for physical needs  -  Identify cognitive and physical deficits and behaviors that affect risk of falls  -  [place] fall precautions as indicated by assessment   - Educate patient/family on patient safety including physical limitations  - Instruct patient to call for assistance with activity based on assessment  - Modify environment to reduce risk of injury  - Consider OT/PT consult to assist with strengthening/mobility  Outcome: Progressing     Problem: Knowledge Deficit  Goal: Patient/family/caregiver demonstrates understanding of disease process, treatment plan, medications, and discharge instructions  Description  Complete learning assessment and assess knowledge base    Interventions:  - Provide teaching at level of understanding  - Provide teaching via preferred learning methods  Outcome: Progressing

## 2020-02-12 ENCOUNTER — TELEPHONE (OUTPATIENT)
Dept: HEMATOLOGY ONCOLOGY | Facility: CLINIC | Age: 70
End: 2020-02-12

## 2020-02-12 ENCOUNTER — ANTICOAG VISIT (OUTPATIENT)
Dept: CARDIOLOGY CLINIC | Facility: CLINIC | Age: 70
End: 2020-02-12

## 2020-02-12 ENCOUNTER — TELEPHONE (OUTPATIENT)
Dept: FAMILY MEDICINE CLINIC | Facility: CLINIC | Age: 70
End: 2020-02-12

## 2020-02-12 DIAGNOSIS — C90.00 MULTIPLE MYELOMA NOT HAVING ACHIEVED REMISSION (HCC): ICD-10-CM

## 2020-02-12 DIAGNOSIS — I48.20 CHRONIC ATRIAL FIBRILLATION (HCC): ICD-10-CM

## 2020-02-12 LAB — INR PPP: 1.6 (ref 0.84–1.19)

## 2020-02-12 RX ORDER — ACYCLOVIR 400 MG/1
TABLET ORAL
Qty: 30 TABLET | Refills: 5 | Status: SHIPPED | OUTPATIENT
Start: 2020-02-12 | End: 2020-07-14 | Stop reason: HOSPADM

## 2020-02-12 NOTE — TELEPHONE ENCOUNTER
Tiesha López from [facility] called to inform that patient will not be taking several of his medications due to being out of the building till after 1AM

## 2020-02-12 NOTE — TELEPHONE ENCOUNTER
FRANCISCO Cohn from [facility] called stating patient had labs drawn yesterday for his upcoming treatment  They are clarifying if he needs labs every week?     Per treatment he is getting Bortezomib every 35 days  Please clarify with Dr Samson Reyes and call FRANCISCO Cohn back with labs and orders if needed  (how often)

## 2020-02-12 NOTE — PROGRESS NOTES
2/12/20, tc to [facility], spoke with Breann Sanchez, she is unsure if pt has missed any doses  Will increase dose, 7 5 mg sun/tues, 10 mg other days of the week, inr due 1 week, faxed to Breann bernabep

## 2020-02-12 NOTE — TELEPHONE ENCOUNTER
ARIA FROM [facility] [phone]) CALLING TO INFORM  THAT PT BLOOD SUGAR  AT BREAKFAST  HE SAID HE IS ASYMPTOMATIC AT THIS TIME    ANY QUESTIONS CALL [phone] AND ASK FOR Annalee Florence

## 2020-02-12 NOTE — TELEPHONE ENCOUNTER
Spoke with Sreedhar Michael at [facility] and [facility] also [facility]  Pt is receiving chemo which is probably affecting his BS's  Dr Debbie Guerin made aware and gave verbal order for Novolog 10 units now (in addition to pt's normal meds) and to call back with BS reading in 1 hr

## 2020-02-13 DIAGNOSIS — E11.65 UNCONTROLLED TYPE 2 DIABETES MELLITUS WITH HYPERGLYCEMIA (HCC): Primary | ICD-10-CM

## 2020-02-13 RX ORDER — INSULIN ASPART 100 [IU]/ML
INJECTION, SOLUTION INTRAVENOUS; SUBCUTANEOUS
Qty: 15 ML | Refills: 0 | Status: SHIPPED | OUTPATIENT
Start: 2020-02-13 | End: 2020-04-13

## 2020-02-13 NOTE — TELEPHONE ENCOUNTER
Babetta Singleton called back his BS is down to [phone] from [phone] Please give him a call back at [phone]

## 2020-02-13 NOTE — TELEPHONE ENCOUNTER
Eren aware, per Britney Gonsalez, to give another 10u Novolog  Recheck blood sugar in 1 hour  As long as it is coming down, report to Dr Mayuri Diggs in morning  If BS goes over 500 again, to call on call physician

## 2020-02-16 DIAGNOSIS — R60.0 LOCALIZED EDEMA: ICD-10-CM

## 2020-02-16 RX ORDER — TORSEMIDE 10 MG/1
20 TABLET ORAL 2 TIMES DAILY
Qty: 120 TABLET | Refills: 1
Start: 2020-02-16 | End: 2020-03-14

## 2020-02-16 RX ORDER — TORSEMIDE 10 MG/1
TABLET ORAL
Qty: 120 TABLET | Refills: 2 | OUTPATIENT
Start: 2020-02-16

## 2020-02-17 ENCOUNTER — HOSPITAL ENCOUNTER (OUTPATIENT)
Dept: INFUSION CENTER | Facility: HOSPITAL | Age: 70
Discharge: HOME | End: 2020-02-17
Attending: INTERNAL MEDICINE
Payer: COMMERCIAL

## 2020-02-17 VITALS
RESPIRATION RATE: 20 BRPM | HEIGHT: 75 IN | DIASTOLIC BLOOD PRESSURE: 73 MMHG | HEART RATE: 71 BPM | TEMPERATURE: 98.2 F | OXYGEN SATURATION: 97 % | BODY MASS INDEX: 39.17 KG/M2 | SYSTOLIC BLOOD PRESSURE: 164 MMHG | WEIGHT: 315 LBS

## 2020-02-17 DIAGNOSIS — C90.00 MULTIPLE MYELOMA NOT HAVING ACHIEVED REMISSION (HCC): Primary | Chronic | ICD-10-CM

## 2020-02-17 PROCEDURE — 96401 CHEMO ANTI-NEOPL SQ/IM: CPT

## 2020-02-17 RX ORDER — DEXAMETHASONE 4 MG/1
20 TABLET ORAL ONCE
Status: COMPLETED | OUTPATIENT
Start: 2020-02-17 | End: 2020-02-17

## 2020-02-17 RX ADMIN — DEXAMETHASONE 20 MG: 4 TABLET ORAL at 13:47

## 2020-02-17 RX ADMIN — BORTEZOMIB 3.5 MG: 3.5 INJECTION, POWDER, LYOPHILIZED, FOR SOLUTION INTRAVENOUS; SUBCUTANEOUS at 14:00

## 2020-02-19 ENCOUNTER — ANTICOAG VISIT (OUTPATIENT)
Dept: CARDIOLOGY CLINIC | Facility: CLINIC | Age: 70
End: 2020-02-19

## 2020-02-19 ENCOUNTER — TELEPHONE (OUTPATIENT)
Dept: HEMATOLOGY ONCOLOGY | Facility: CLINIC | Age: 70
End: 2020-02-19

## 2020-02-19 DIAGNOSIS — I48.20 CHRONIC ATRIAL FIBRILLATION (HCC): ICD-10-CM

## 2020-02-19 LAB — INR PPP: 2.1 (ref 0.84–1.19)

## 2020-02-19 NOTE — TELEPHONE ENCOUNTER
Task received and forwarded to Rocael Jang MA  Facility had called on 2/12 and task was sent  Please follow up on this

## 2020-02-19 NOTE — TELEPHONE ENCOUNTER
Shelby Lawrence from [facility] regarding patient's bloodwork orders   Please give her a call back at [phone]

## 2020-02-19 NOTE — TELEPHONE ENCOUNTER
As previously stated, this was already addressed  I called the facility last week and spoke to a nurse who informed me that someone had already called them and clarified the labs for them  I will call them again

## 2020-02-24 ENCOUNTER — HOSPITAL ENCOUNTER (OUTPATIENT)
Dept: INFUSION CENTER | Facility: HOSPITAL | Age: 70
Discharge: HOME | End: 2020-02-24
Attending: INTERNAL MEDICINE
Payer: COMMERCIAL

## 2020-02-24 ENCOUNTER — TELEPHONE (OUTPATIENT)
Dept: HEMATOLOGY ONCOLOGY | Facility: CLINIC | Age: 70
End: 2020-02-24

## 2020-02-24 VITALS
HEART RATE: 70 BPM | DIASTOLIC BLOOD PRESSURE: 71 MMHG | HEIGHT: 75 IN | BODY MASS INDEX: 39.17 KG/M2 | TEMPERATURE: 97.4 F | OXYGEN SATURATION: 96 % | WEIGHT: 315 LBS | RESPIRATION RATE: 18 BRPM | SYSTOLIC BLOOD PRESSURE: 139 MMHG

## 2020-02-24 DIAGNOSIS — C90.00 MULTIPLE MYELOMA NOT HAVING ACHIEVED REMISSION (HCC): Primary | Chronic | ICD-10-CM

## 2020-02-24 PROCEDURE — 96401 CHEMO ANTI-NEOPL SQ/IM: CPT

## 2020-02-24 RX ORDER — DEXAMETHASONE 4 MG/1
20 TABLET ORAL ONCE
Status: COMPLETED | OUTPATIENT
Start: 2020-02-24 | End: 2020-02-24

## 2020-02-24 RX ADMIN — DEXAMETHASONE 20 MG: 4 TABLET ORAL at 09:43

## 2020-02-24 RX ADMIN — BORTEZOMIB 3.5 MG: 3.5 INJECTION, POWDER, LYOPHILIZED, FOR SOLUTION INTRAVENOUS; SUBCUTANEOUS at 10:07

## 2020-02-24 NOTE — PROGRESS NOTES
Pt here for Velcade; VSS; Velcade given in the L abd with no adv reactions; bandaid dry and intact; pt d/c'd in wc by transport

## 2020-02-24 NOTE — TELEPHONE ENCOUNTER
Soledad Lai from [facility] is calling in requesting for pt's lab orders to be sent over with dates and what labs need to be completed  when the patient needs them if any further questions please give her a call back at [phone]

## 2020-02-24 NOTE — PLAN OF CARE
Problem: Potential for Falls  Goal: Patient will remain free of falls  Description  INTERVENTIONS:  - Assess patient frequently for physical needs  -  Identify cognitive and physical deficits and behaviors that affect risk of falls  -  [place] fall precautions as indicated by assessment   - Educate patient/family on patient safety including physical limitations  - Instruct patient to call for assistance with activity based on assessment  - Modify environment to reduce risk of injury  - Consider OT/PT consult to assist with strengthening/mobility  Outcome: Progressing     Problem: Potential for Falls  Goal: Patient will remain free of falls  Description  INTERVENTIONS:  - Assess patient frequently for physical needs  -  Identify cognitive and physical deficits and behaviors that affect risk of falls  -  [place] fall precautions as indicated by assessment   - Educate patient/family on patient safety including physical limitations  - Instruct patient to call for assistance with activity based on assessment  - Modify environment to reduce risk of injury  - Consider OT/PT consult to assist with strengthening/mobility  Outcome: Progressing     Problem: Knowledge Deficit  Goal: Patient/family/caregiver demonstrates understanding of disease process, treatment plan, medications, and discharge instructions  Description  Complete learning assessment and assess knowledge base    Interventions:  - Provide teaching at level of understanding  - Provide teaching via preferred learning methods  Outcome: Progressing

## 2020-02-25 ENCOUNTER — TELEPHONE (OUTPATIENT)
Dept: INFUSION CENTER | Facility: HOSPITAL | Age: 70
End: 2020-02-25

## 2020-02-28 ENCOUNTER — TELEPHONE (OUTPATIENT)
Dept: INFUSION CENTER | Facility: HOSPITAL | Age: 70
End: 2020-02-28

## 2020-03-02 ENCOUNTER — HOSPITAL ENCOUNTER (OUTPATIENT)
Dept: INFUSION CENTER | Facility: HOSPITAL | Age: 70
Discharge: HOME | End: 2020-03-02
Attending: INTERNAL MEDICINE
Payer: COMMERCIAL

## 2020-03-02 VITALS
SYSTOLIC BLOOD PRESSURE: 155 MMHG | RESPIRATION RATE: 18 BRPM | HEART RATE: 73 BPM | DIASTOLIC BLOOD PRESSURE: 60 MMHG | BODY MASS INDEX: 39.17 KG/M2 | WEIGHT: 315 LBS | TEMPERATURE: 97.8 F | HEIGHT: 75 IN

## 2020-03-02 DIAGNOSIS — C90.00 MULTIPLE MYELOMA NOT HAVING ACHIEVED REMISSION (HCC): Primary | Chronic | ICD-10-CM

## 2020-03-02 PROCEDURE — 96401 CHEMO ANTI-NEOPL SQ/IM: CPT

## 2020-03-02 RX ORDER — DEXAMETHASONE 4 MG/1
20 TABLET ORAL ONCE
Status: COMPLETED | OUTPATIENT
Start: 2020-03-02 | End: 2020-03-02

## 2020-03-02 RX ADMIN — BORTEZOMIB 3.5 MG: 3.5 INJECTION, POWDER, LYOPHILIZED, FOR SOLUTION INTRAVENOUS; SUBCUTANEOUS at 11:08

## 2020-03-02 RX ADMIN — DEXAMETHASONE 20 MG: 4 TABLET ORAL at 10:51

## 2020-03-02 NOTE — PLAN OF CARE
Problem: Potential for Falls  Goal: Patient will remain free of falls  Description  INTERVENTIONS:  - Assess patient frequently for physical needs  -  Identify cognitive and physical deficits and behaviors that affect risk of falls  -  [place] fall precautions as indicated by assessment   - Educate patient/family on patient safety including physical limitations  - Instruct patient to call for assistance with activity based on assessment  - Modify environment to reduce risk of injury  - Consider OT/PT consult to assist with strengthening/mobility  Outcome: Progressing     Problem: Potential for Falls  Goal: Patient will remain free of falls  Description  INTERVENTIONS:  - Assess patient frequently for physical needs  -  Identify cognitive and physical deficits and behaviors that affect risk of falls  -  [place] fall precautions as indicated by assessment   - Educate patient/family on patient safety including physical limitations  - Instruct patient to call for assistance with activity based on assessment  - Modify environment to reduce risk of injury  - Consider OT/PT consult to assist with strengthening/mobility  Outcome: Progressing     Problem: Knowledge Deficit  Goal: Patient/family/caregiver demonstrates understanding of disease process, treatment plan, medications, and discharge instructions  Description  Complete learning assessment and assess knowledge base    Interventions:  - Provide teaching at level of understanding  - Provide teaching via preferred learning methods  Outcome: Progressing

## 2020-03-02 NOTE — PROGRESS NOTES
Pt here for Velcade; injection given with no adverse reactions; bandaid dry and intact; pt left unit in wc with   AVS given

## 2020-03-04 ENCOUNTER — ANTICOAG VISIT (OUTPATIENT)
Dept: CARDIOLOGY CLINIC | Facility: CLINIC | Age: 70
End: 2020-03-04

## 2020-03-04 DIAGNOSIS — I48.20 CHRONIC ATRIAL FIBRILLATION (HCC): ICD-10-CM

## 2020-03-04 LAB — INR PPP: 2 (ref 0.84–1.19)

## 2020-03-04 NOTE — PROGRESS NOTES
3/4/20, tc to [address] [place] [address], spoke with Breann Sanchez, faxed to continue current dose, inr due 3 weeks   kathya

## 2020-03-08 DIAGNOSIS — E11.641 UNCONTROLLED TYPE 2 DIABETES MELLITUS WITH HYPOGLYCEMIA AND COMA (HCC): Primary | Chronic | ICD-10-CM

## 2020-03-08 RX ORDER — LANCETS 28 GAUGE
EACH MISCELLANEOUS
Qty: 100 EACH | Refills: 5 | Status: ON HOLD | OUTPATIENT
Start: 2020-03-08

## 2020-03-14 DIAGNOSIS — R60.0 LOCALIZED EDEMA: ICD-10-CM

## 2020-03-14 RX ORDER — TORSEMIDE 10 MG/1
TABLET ORAL
Qty: 120 TABLET | Refills: 2 | Status: SHIPPED | OUTPATIENT
Start: 2020-03-14 | End: 2020-05-15

## 2020-03-16 ENCOUNTER — HOSPITAL ENCOUNTER (OUTPATIENT)
Dept: INFUSION CENTER | Facility: HOSPITAL | Age: 70
Discharge: HOME | End: 2020-03-16
Attending: INTERNAL MEDICINE
Payer: COMMERCIAL

## 2020-03-16 VITALS
WEIGHT: 315 LBS | OXYGEN SATURATION: 95 % | BODY MASS INDEX: 39.17 KG/M2 | SYSTOLIC BLOOD PRESSURE: 144 MMHG | DIASTOLIC BLOOD PRESSURE: 80 MMHG | HEIGHT: 75 IN | TEMPERATURE: 97.9 F | RESPIRATION RATE: 18 BRPM | HEART RATE: 65 BPM

## 2020-03-16 DIAGNOSIS — C90.00 MULTIPLE MYELOMA NOT HAVING ACHIEVED REMISSION (HCC): Primary | Chronic | ICD-10-CM

## 2020-03-16 PROCEDURE — 96401 CHEMO ANTI-NEOPL SQ/IM: CPT

## 2020-03-16 RX ORDER — DEXAMETHASONE 4 MG/1
20 TABLET ORAL ONCE
Status: COMPLETED | OUTPATIENT
Start: 2020-03-16 | End: 2020-03-16

## 2020-03-16 RX ADMIN — BORTEZOMIB 3.5 MG: 3.5 INJECTION, POWDER, LYOPHILIZED, FOR SOLUTION INTRAVENOUS; SUBCUTANEOUS at 13:19

## 2020-03-16 RX ADMIN — DEXAMETHASONE 20 MG: 4 TABLET ORAL at 12:55

## 2020-03-19 RX ORDER — DEXAMETHASONE 4 MG/1
20 TABLET ORAL ONCE
Status: CANCELLED
Start: 2020-03-23

## 2020-03-23 ENCOUNTER — HOSPITAL ENCOUNTER (OUTPATIENT)
Dept: INFUSION CENTER | Facility: HOSPITAL | Age: 70
Discharge: HOME | End: 2020-03-23
Attending: INTERNAL MEDICINE
Payer: COMMERCIAL

## 2020-03-23 VITALS
DIASTOLIC BLOOD PRESSURE: 55 MMHG | WEIGHT: 315 LBS | HEIGHT: 75 IN | RESPIRATION RATE: 18 BRPM | TEMPERATURE: 98 F | HEART RATE: 83 BPM | SYSTOLIC BLOOD PRESSURE: 149 MMHG | BODY MASS INDEX: 39.17 KG/M2 | OXYGEN SATURATION: 96 %

## 2020-03-23 DIAGNOSIS — C90.00 MULTIPLE MYELOMA NOT HAVING ACHIEVED REMISSION (HCC): Primary | Chronic | ICD-10-CM

## 2020-03-23 PROCEDURE — 96401 CHEMO ANTI-NEOPL SQ/IM: CPT

## 2020-03-23 RX ORDER — DEXAMETHASONE 4 MG/1
20 TABLET ORAL ONCE
Status: COMPLETED | OUTPATIENT
Start: 2020-03-23 | End: 2020-03-23

## 2020-03-23 RX ADMIN — BORTEZOMIB 3.5 MG: 3.5 INJECTION, POWDER, LYOPHILIZED, FOR SOLUTION INTRAVENOUS; SUBCUTANEOUS at 09:48

## 2020-03-23 RX ADMIN — DEXAMETHASONE 20 MG: 4 TABLET ORAL at 09:23

## 2020-03-23 NOTE — PLAN OF CARE
Problem: Potential for Falls  Goal: Patient will remain free of falls  Description  INTERVENTIONS:  - Assess patient frequently for physical needs  -  Identify cognitive and physical deficits and behaviors that affect risk of falls  -  [place] fall precautions as indicated by assessment   - Educate patient/family on patient safety including physical limitations  - Instruct patient to call for assistance with activity based on assessment  - Modify environment to reduce risk of injury  - Consider OT/PT consult to assist with strengthening/mobility  Outcome: Progressing     Problem: Knowledge Deficit  Goal: Patient/family/caregiver demonstrates understanding of disease process, treatment plan, medications, and discharge instructions  Description  Complete learning assessment and assess knowledge base  Interventions:  - Provide teaching at level of understanding  - Provide teaching via preferred learning methods  Outcome: Progressing     Problem: Potential for Falls  Goal: Patient will remain free of falls  Description  INTERVENTIONS:  - Assess patient frequently for physical needs  -  Identify cognitive and physical deficits and behaviors that affect risk of falls    -  [place] fall precautions as indicated by assessment   - Educate patient/family on patient safety including physical limitations  - Instruct patient to call for assistance with activity based on assessment  - Modify environment to reduce risk of injury  - Consider OT/PT consult to assist with strengthening/mobility  Outcome: Progressing

## 2020-03-23 NOTE — PROGRESS NOTES
Pt arrived on unit for velcade injection  Pt offers no complaints at this time  Emma Florian, RN made aware of pts creat level 1 35, OK to treat, remind pt to get plenty of hydration  Pt informed, and voiced understanding   Will cont to hermelinda

## 2020-03-25 ENCOUNTER — ANTICOAG VISIT (OUTPATIENT)
Dept: CARDIOLOGY CLINIC | Facility: CLINIC | Age: 70
End: 2020-03-25

## 2020-03-25 ENCOUNTER — TELEPHONE (OUTPATIENT)
Dept: HEMATOLOGY ONCOLOGY | Facility: CLINIC | Age: 70
End: 2020-03-25

## 2020-03-25 DIAGNOSIS — I48.20 CHRONIC ATRIAL FIBRILLATION (HCC): ICD-10-CM

## 2020-03-25 LAB — INR PPP: 3.5 (ref 0.84–1.19)

## 2020-03-25 NOTE — PROGRESS NOTES
Faxed form/orders to [facility] with 3 5 INR which is too high  Patient advised per AnnaRose to hold for one day, then 7 5 mgs TH/SUN/TUES and 10 mgs all other days  Get another INR in one week on 4/1/2020

## 2020-03-25 NOTE — TELEPHONE ENCOUNTER
Patient called to confirm their appointment  Appointment confirmed for Dr Shaina Estrada             Appointment date and time: 3-27-20 @ 11;00am               [place] location:  [facility]              Patient verbalized understanding of above

## 2020-03-27 ENCOUNTER — OFFICE VISIT (OUTPATIENT)
Dept: HEMATOLOGY ONCOLOGY | Facility: HOSPITAL | Age: 70
End: 2020-03-27
Payer: COMMERCIAL

## 2020-03-27 ENCOUNTER — TELEPHONE (OUTPATIENT)
Dept: HEMATOLOGY ONCOLOGY | Facility: CLINIC | Age: 70
End: 2020-03-27

## 2020-03-27 VITALS
HEART RATE: 75 BPM | SYSTOLIC BLOOD PRESSURE: 122 MMHG | RESPIRATION RATE: 16 BRPM | TEMPERATURE: 97.9 F | OXYGEN SATURATION: 98 % | DIASTOLIC BLOOD PRESSURE: 62 MMHG

## 2020-03-27 DIAGNOSIS — C90.00 MULTIPLE MYELOMA NOT HAVING ACHIEVED REMISSION (HCC): Primary | ICD-10-CM

## 2020-03-27 PROCEDURE — 3078F DIAST BP <80 MM HG: CPT | Performed by: INTERNAL MEDICINE

## 2020-03-27 PROCEDURE — 3074F SYST BP LT 130 MM HG: CPT | Performed by: INTERNAL MEDICINE

## 2020-03-27 PROCEDURE — 3066F NEPHROPATHY DOC TX: CPT | Performed by: INTERNAL MEDICINE

## 2020-03-27 PROCEDURE — 99214 OFFICE O/P EST MOD 30 MIN: CPT | Performed by: INTERNAL MEDICINE

## 2020-03-27 PROCEDURE — 1036F TOBACCO NON-USER: CPT | Performed by: INTERNAL MEDICINE

## 2020-03-27 PROCEDURE — 4040F PNEUMOC VAC/ADMIN/RCVD: CPT | Performed by: INTERNAL MEDICINE

## 2020-03-27 PROCEDURE — 1160F RVW MEDS BY RX/DR IN RCRD: CPT | Performed by: INTERNAL MEDICINE

## 2020-03-27 NOTE — PROGRESS NOTES
Hematology/Oncology Outpatient Follow- up Note  Chrissy Lowe 81 y o  male MRN: @ Encounter: [id number]        Date:  3/27/2020    Presenting Complaint/Diagnosis :     Biclonal gammopathy with IgG Kappa  Bone marrow biopsy revealed multiple myeloma  HPI:    The patient was admitted to the hospital with wound infection  Nobie Min has a history of DM, HTN, and several other comorbid conditions   He seems to have had a slightly elevated creatinine for about the past year, seems to be slightly increasing more recently  He has also been anemic for quiet some time  Skeletal survey was negative but we did a bone marrow biopsy which showed plasma cell neoplasm consistent with myeloma so he was referred to see us  Previous Hematologic/ Oncologic History:       Multiple myeloma ([facility] )    9/16/2019 Initial Diagnosis     Multiple myeloma not having achieved remission ([facility] )      9/24/2019 -  Chemotherapy     iron sucrose (VENOFER) injection 200 mg, 200 mg (100 % of original dose 200 mg), Intravenous, Once, 1 of 1 cycle  Dose modification: 200 mg (original dose 200 mg, Cycle 1, Reason: Other (See Comments))  Administration: 200 mg (9/24/2019)  bortezomib (VELCADE) subcutaneous injection 3 5 mg, 1 3 mg/m2 = 3 5 mg (81 3 % of original dose 1 6 mg/m2), Subcutaneous, Once, 4 of 6 cycles  Dose modification: 1 3 mg/m2 (original dose 1 6 mg/m2, Cycle 1, Reason: Dose Not Tolerated)  Administration: 3 5 mg (9/24/2019), 3 5 mg (10/1/2019), 3 5 mg (10/8/2019), 3 5 mg (10/15/2019), 3 5 mg (1/6/2020), 3 5 mg (1/13/2020), 3 5 mg (1/20/2020), 3 5 mg (1/27/2020), 3 5 mg (2/11/2020), 3 5 mg (2/17/2020), 3 5 mg (2/24/2020), 3 5 mg (3/2/2020), 3 5 mg (3/16/2020), 3 5 mg (3/23/2020)         Current Hematologic/ Oncologic Treatment:      Velcade and Decadron    Interval History:      The patient returns for follow-up visit  He was last treated in October  He was admitted to the hospital with leg swelling  He then went to rehabilitation   He is now back and his nursing facility  He states he is feeling at baseline  Denies any nausea denies any vomiting denies any diarrhea  Day 15 of cycle 4 is planned for the 30th of March  The rest of his 14 point review of systems today was negative  His most recent blood work from March shows no M spike  Free light chain ratio is elevated at 2 46 with an elevated kappa free light chain at 50 3  IgG was low at 551 IgA was slightly elevated at 379 with an IgM of 24 i e  slightly low  Kappa   Free light chains in January were 30 68 ratio at the time was 2 44  They are however improved compared to January of 2020 when the IG kappa free light chain was 120 with a ratio 6 7 to it seems he is having a response to treatment considering in December of 2019 the kappa free light chain was 142 with a ratio of 7 42    Test Results:    Imaging: No results found  Labs:   Lab Results   Component Value Date    WBC 9 67 01/17/2020    HGB 12 9 01/17/2020    HCT 42 1 01/17/2020    MCV 89 01/17/2020     01/17/2020     Lab Results   Component Value Date     01/15/2018    K 3 9 01/17/2020     01/17/2020    CO2 29 01/17/2020    ANIONGAP 11 12/22/2015    BUN 38 (H) 01/17/2020    CREATININE 1 28 01/17/2020    GLUCOSE 139 12/22/2015    GLUF 141 (H) 02/26/2019    CALCIUM 9 0 01/17/2020    AST 9 01/17/2020    ALT 24 01/17/2020    ALKPHOS 154 (H) 01/17/2020    PROT 7 1 01/15/2018    BILITOT 0 6 01/15/2018    EGFR 58 01/17/2020         Lab Results   Component Value Date    SPEP  01/06/2020     The SPEP shows a biclonal gammopathy  Previous immunofixation on 8/05/19 identified two IgA kappa bands  Reviewed by: Farida Jones MD  ([id number])  **Electronic Signature**    UPEP  08/04/2019     The UPEP shows non-selective proteinuria  The UPEP shows a monoclonal gammopathy  Immunofixation to be performed   Reviewed by: Adolfo Santiago MD **Electronic Signature**       Lab Results   Component Value Date    IRON 49 (L) 11/01/2019 TIBC 254 11/01/2019    FERRITIN 824 (H) 11/01/2019       ROS: As stated in the history of present illness otherwise his 14 point review of systems today was negative  Active Problems:   Patient Active Problem List   Diagnosis    Diabetic foot ulcer ([place] [address] )    Diabetes mellitus type 2, uncontrolled ([place] [address] )    Chronic venous hypertension, right    Essential hypertension    Chronic atrial fibrillation    Morbid obesity ([place] [address] )    Acute on chronic diastolic congestive heart failure (HCC)    Cardiomyopathy (HCC)    Diabetes, polyneuropathy (HCC)    GERD (gastroesophageal reflux disease)    Hyperlipidemia    Onychomycosis    Gallstones    Localized edema    Peripheral vascular disease (HCC)    SOB (shortness of breath)    NALLELY (obstructive sleep apnea)    Moderate persistent asthma without complication    Multiple myeloma (HCC)    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)    Hiatal hernia    Weakness    Elevated troponin    Chronic obstructive pulmonary disease, unspecified (HCC)    Hypertensive chronic kidney disease w stg 1-4/unsp chr kdny    Mass of psoas muscle    CKD (chronic kidney disease)    Chronic diastolic (congestive) heart failure (HCC)    Lymphedema    Rash       Past Medical History:   Past Medical History:   Diagnosis Date    Cancer ([place] [address] )     CHF (congestive heart failure) (HCC)     Chronic kidney disease     COPD (chronic obstructive pulmonary disease) (HCC)     Decubitus ulcer of heel     LAST ASSESSED [id number]    Diabetes mellitus (HCC)     History of varicose veins     Hypertension     Intermittent claudication (HCC)     Neuropathy     Seasonal allergies        Surgical History:   Past Surgical History:   Procedure Laterality Date    AMPUTATION ABOVE KNEE (AKA) Left 7/31/2019    Procedure: AMPUTATION ABOVE KNEE (AKA);   Surgeon: Chris Bradley MD;  Location:  MAIN OR;  Service: General    ANGIOPLASTY      W/ FLUOROSC ANGIOGRAPGY PERIPHERAL ARTERY ADDITIONAL  LAST ASSESSED [id number]    ANGIOPLASTY / STENTING FEMORAL      TANSCATH INTRAVASCULAR STENT PLACEMENT PERCUTANEOUS FEMORAL     COLONOSCOPY  2010    CT BONE MARROW BIOPSY AND ASPIRATION  8/9/2019    FULL THICKNESS SKIN GRAFT      TENDON REPAIR      TOE AMPUTATION Left 12/27/2018    Procedure: AMPUTATION left 4th TOE;  Surgeon: Malena Jean DPM;  Location:  MAIN OR;  Service: Podiatry       Family History:    Family History   Problem Relation Age of Onset    Other Mother         CARDIAC DISORDER     Diabetes Mother     Cancer Father     Other Family         CARDIAC DISORDER     Diabetes Family     Cancer Family     Mental illness Family     Kidney disease Sister     Diabetes Sister        Cancer-related family history includes Cancer in his family and father      Social History:   Social History     Socioeconomic History    Marital status:      Spouse name: Not on file    Number of children: 1    Years of education: Not on file    Highest education level: Not on file   Occupational History    Occupation: RETIRED   Social Needs    Financial resource strain: Not on file    Food insecurity:     Worry: Not on file     Inability: Not on file   iPling needs:     Medical: Not on file     Non-medical: Not on file   Tobacco Use    Smoking status: Never Smoker    Smokeless tobacco: Never Used   Substance and Sexual Activity    Alcohol use: Not Currently    Drug use: Not Currently    Sexual activity: Not Currently   Lifestyle    Physical activity:     Days per week: Not on file     Minutes per session: Not on file    Stress: Not on file   Relationships    Social connections:     Talks on phone: Not on file     Gets together: Not on file     Attends Restoration service: Not on file     Active member of club or organization: Not on file     Attends meetings of clubs or organizations: Not on file     Relationship status: Not on file    Intimate partner violence:     Fear of current or ex partner: Not on file     Emotionally abused: Not on file     Physically abused: Not on file     Forced sexual activity: Not on file   Other Topics Concern    Not on file   Social History Narrative    DENIED [address]    FEELS SAFE  [facility]    NO LIVING WILL    POA IN EXISTENCE     SUPPORTIVE AND SAFE    One son, 2 granddaughters       Current Medications:   Current Outpatient Medications   Medication Sig Dispense Refill    acetaminophen (TYLENOL) 500 mg tablet Take 1 tablet (500 mg total) by mouth every 6 (six) hours as needed for mild pain, headaches or fever 90 tablet 1    acyclovir (ZOVIRAX) 400 MG tablet TAKE ONE TABLET BY MOUTH EVERY DAY 30 tablet 5    albuterol (PROVENTIL HFA,VENTOLIN HFA) 90 mcg/act inhaler Inhale 2 puffs every 6 (six) hours as needed for wheezing 1 Inhaler 0    Alcohol Swabs (ALCOHOL PREP) 70 % PADS   0    allopurinol (ZYLOPRIM) 300 mg tablet TAKE 1 TABLET BY MOUTH DAILY 90 tablet 1    ammonium lactate (LAC-HYDRIN) 12 % lotion   99    atorvastatin (LIPITOR) 20 mg tablet TAKE 1 TABLET BY MOUTH ONCE DAILY 90 tablet 0    BD AUTOSHIELD DUO 30G X 5 MM MISC USE AS DIRECTED WITH INSULIN FOUR TIMES A  each 3    BD INSULIN SYRINGE U/F 31G X 5/16" 0 5 ML MISC USE AS DIRECTED WITH NOVOLIN 70/30  0    BD PEN NEEDLE HILARIO U/F 32G X 4 MM MISC by Other route 3 (three) times a day 300 each 1    bisacodyl (bisacodyl) 5 mg EC tablet Take 5 mg by mouth daily as needed for constipation      bisacodyl (DULCOLAX) 10 mg suppository Insert 10 mg into the rectum as needed for constipation      bortezomib (VELCADE) Infuse into a venous catheter once      clotrimazole (LOTRIMIN) 1 % cream APPLY TO BUTTOCK 2 TIMES DAILY 45 g 3    Easy Touch Safety Lancets 28G MISC USE 4 TIMES DAILY TO TEST BLOOD SUGAR 100 each 5    fluticasone-salmeterol (ADVAIR DISKUS, WIXELA INHUB) 250-50 mcg/dose inhaler Inhale 1 puff 2 (two) times a day Rinse mouth after use  1 Inhaler 5    insulin glargine (LANTUS SOLOSTAR) 100 units/mL injection pen Inject 22 Units under the skin daily 5 pen 5    insulin lispro (HumaLOG) 100 units/mL injection Inject 5 Units under the skin 3 (three) times a day with meals  0    Insulin Pen Needle 31G X 5 MM MISC by Does not apply route 2 (two) times a day for 50 days 100 each 0    magnesium hydroxide (MILK OF MAGNESIA) 400 mg/5 mL oral suspension Take 30 mL by mouth daily as needed for constipation      Melatonin 5 MG TABS TAKE ONE TABLET BY MOUTH EVERY NIGHT AT BEDTIME 30 tablet 2    metFORMIN (GLUCOPHAGE) 1000 MG tablet TAKE ONE TABLET BY MOUTH TWO TIMES A DAY 60 tablet 2    metoprolol tartrate (LOPRESSOR) 25 mg tablet TAKE 1 TABLET BY MOUTH EVERY 12 HOURS 180 tablet 3    NOVOLOG FLEXPEN 100 units/mL SOPN INJ 5U BEFORE MEALS AND PER SS <250=NO CALL 250-300=5U,301-350= 10U,351-400=15U,401-450=20U>451 CALL FOR ORDERS UP TO 4X PER DAY 15 mL 0    torsemide (DEMADEX) 10 mg tablet TAKE 2 TABLETS BY MOUTH TWO TIMES A  tablet 2    triamcinolone (KENALOG) 0 1 % cream Apply topically 2 (two) times a day 30 g 0    warfarin (COUMADIN) 7 5 mg tablet 7 5 mg on August 12th and 13th then INR on August 14th (Patient taking differently: Take 5 mg by mouth daily Takes 1 5 tabs at HS every Sunday, Tuesday, Thursday  Takes 2 tabs HS every Monday, Wednesday, Friday and Saturday) 30 tablet 0     No current facility-administered medications for this visit  Allergies: Allergies   Allergen Reactions    Latex Rash       Physical Exam:    There is no height or weight on file to calculate BSA      Wt Readings from Last 3 Encounters:   03/23/20 (!) 153 kg (337 lb 15 4 oz)   03/16/20 (!) 156 kg (343 lb 7 6 oz)   03/02/20 (!) 155 kg (342 lb 13 oz)        Temp Readings from Last 3 Encounters:   03/23/20 98 °F (36 7 °C) (Temporal)   03/16/20 97 9 °F (36 6 °C) (Temporal)   03/02/20 97 8 °F (36 6 °C) (Temporal)        BP Readings from Last 3 Encounters:   03/23/20 149/55   03/16/20 144/80   03/02/20 155/60         Pulse Readings from Last 3 Encounters:   03/23/20 83   03/16/20 65   03/02/20 73      Physical Exam     Constitutional   General appearance: No acute distress, well appearing and well nourished  Eyes   Conjunctiva and lids: No swelling, erythema or discharge  Pupils and irises: Equal, round and reactive to light  Ears, Nose, Mouth, and Throat   External inspection of ears and nose: Normal     Nasal mucosa, septum, and turbinates: Normal without edema or erythema  Oropharynx: Normal with no erythema, edema, exudate or lesions  Pulmonary   Respiratory effort: No increased work of breathing or signs of respiratory distress  Auscultation of lungs: Clear to auscultation  Cardiovascular   Palpation of heart: Normal PMI, no thrills  Auscultation of heart: Normal rate and rhythm, normal S1 and S2, without murmurs  Examination of extremities for edema and/or varicosities: Normal     Carotid pulses: Normal     Abdomen   Abdomen: Non-tender, no masses  Liver and spleen: No hepatomegaly or splenomegaly  Lymphatic   Palpation of lymph nodes in neck: No lymphadenopathy  Musculoskeletal   Gait and station:   In a wheelchair  Digits and nails: Normal without clubbing or cyanosis  Inspection/palpation of joints, bones, and muscles:  Has had a lower extremity amputation on the left    right lower extremity has dressing all the way up to the knee  Neurologic   Cranial nerves: Cranial nerves 2-12 intact  Sensation: No sensory loss      Psychiatric   Orientation to person, place, and time: Normal     Mood and affect: Normal         Assessment / Plan:      The patient is a 71-year-old male with multiple myeloma   He does have multiple comorbidities including diabetes mellitus and hypertension   We were consulted in August 2019 while he was inpatient due to recent labs revealing by clonal gammopathies with IgG Gilda Jones also does have elevated kidney function and anemia which warranted a workup for multiple myeloma   His skeletal survey was negative, however his bone marrow biopsy did confirm multiple myeloma so he was started on treatment with Velcade and Decadron weekly on a 35 day schedule   Velcade is 1 3 milligrams/meter squared and Decadron is 20 mg p o  On days 1, 8, 15, 22  He has completed 3 cycles so far and is in the midst of his 4th cycle  His blood work shows improvement in his myeloma levels  At this point I will continue him on his current treatment with no change  I'll see him back in 2 months  He will have myeloma blood work repeated in 2 months  He will continue to get blood work every week prior to treatment  The patient is in agreement with the plan  I explained we may consider a maintenance dose IT every 2 weeks down the road  He is in agreement with this when the time comes  Goals and Barriers:  Current Goal:  Prolong Survival from myeloma  Barriers: None  Patient's Capacity to Self Care:  Patient able to self care  Portions of the record may have been created with voice recognition software   Occasional wrong word or "sound a like" substitutions may have occurred due to the inherent limitations of voice recognition software   Read the chart carefully and recognize, using context, where substitutions have occurred

## 2020-03-30 ENCOUNTER — TELEPHONE (OUTPATIENT)
Dept: PULMONOLOGY | Facility: HOSPITAL | Age: 70
End: 2020-03-30

## 2020-03-30 ENCOUNTER — HOSPITAL ENCOUNTER (OUTPATIENT)
Dept: INFUSION CENTER | Facility: HOSPITAL | Age: 70
Discharge: HOME | End: 2020-03-30
Attending: INTERNAL MEDICINE
Payer: COMMERCIAL

## 2020-03-30 ENCOUNTER — TELEMEDICINE (OUTPATIENT)
Dept: PULMONOLOGY | Facility: HOSPITAL | Age: 70
End: 2020-03-30

## 2020-03-30 VITALS
DIASTOLIC BLOOD PRESSURE: 68 MMHG | SYSTOLIC BLOOD PRESSURE: 145 MMHG | HEIGHT: 75 IN | RESPIRATION RATE: 18 BRPM | OXYGEN SATURATION: 96 % | TEMPERATURE: 98.3 F | HEART RATE: 62 BPM | WEIGHT: 315 LBS | BODY MASS INDEX: 39.17 KG/M2

## 2020-03-30 DIAGNOSIS — C90.00 MULTIPLE MYELOMA NOT HAVING ACHIEVED REMISSION (HCC): Primary | Chronic | ICD-10-CM

## 2020-03-30 DIAGNOSIS — J45.40 MODERATE PERSISTENT ASTHMA WITHOUT COMPLICATION: Primary | Chronic | ICD-10-CM

## 2020-03-30 PROCEDURE — G2012 BRIEF CHECK IN BY MD/QHP: HCPCS | Performed by: INTERNAL MEDICINE

## 2020-03-30 PROCEDURE — 96401 CHEMO ANTI-NEOPL SQ/IM: CPT

## 2020-03-30 RX ORDER — DEXAMETHASONE 4 MG/1
20 TABLET ORAL ONCE
Status: CANCELLED
Start: 2020-03-30

## 2020-03-30 RX ORDER — DEXAMETHASONE 4 MG/1
20 TABLET ORAL ONCE
Status: COMPLETED | OUTPATIENT
Start: 2020-03-30 | End: 2020-03-30

## 2020-03-30 RX ADMIN — BORTEZOMIB 3.5 MG: 3.5 INJECTION, POWDER, LYOPHILIZED, FOR SOLUTION INTRAVENOUS; SUBCUTANEOUS at 13:38

## 2020-03-30 RX ADMIN — DEXAMETHASONE 20 MG: 4 TABLET ORAL at 13:05

## 2020-03-30 NOTE — PROGRESS NOTES
Virtual Regular Visit    Reason for visit is follow-up asthma    This virtual check-in was done via telephone  Encounter provider Tign Yee DO    Provider located at [place] [address] [address] [place] [address]      Recent Visits  No visits were found meeting these conditions  Showing recent visits within past 7 days and meeting all other requirements     Today's Visits  Date Type Provider Dept   03/30/20 Telemedicine Ting Yee DO [facility]   03/30/20 Telephone Ting Yee DO [facility]   Showing today's visits and meeting all other requirements     Future Appointments  Date Type Provider Dept   03/30/20 Telephone Ting Yee DO [facility]   03/30/20 Telemedicine Ting Yee DO [facility]   Showing future appointments within next 150 days and meeting all other requirements        Patient agrees to participate in a virtual check in via telephone or video visit instead of presenting to the office to address urgent/immediate medical needs  Patient is aware this is a billable service  After connecting through telephone, the patient was identified by name and date of birth  Boogie Wong was informed that this was a telemedicine visit and that the exam was being conducted confidentially over secure lines  My office door was closed  No one else was in the room  He acknowledged consent and understanding of privacy and security of the virtual check-in visit  I informed the patient that I have reviewed his record in Epic and presented the opportunity for him to ask any questions regarding the visit today  The patient initiated communication and agreed to participate  Progress note - Pulmonary Medicine   Boogie Wong 81 y o  male MRN: [id number]       Impression & Plan:      Moderate persistent asthma without complication  Patient's asthma is well controlled with Advair twice daily  He is rinsing his mouth out after each use  He is not needing rescue therapy on any regular basis  He can follow-up with me in 6 months or sooner if needed  Diagnoses and all orders for this visit:    Moderate persistent asthma without complication        Virtual visit time component:  Total visit time for chart and diagnostic data review, telephonic or video conference communication with the patient, and documentation:  15 minutes    I have personally spent 5 minutes reviewing the chart and imaging prior to the visit  I have personally spent 10 minutes on the phone with the patient  I have personally spent 2 minutes reviewing imaging, laboratory results and other testing results with the patient     ______________________________________________________________________    HPI:    Kavya Richmond is being evaluated by virtual visit for follow-up of asthma  He is doing well from pulmonary perspective  He is using Advair twice daily  He has not needed his rescue inhaler on any regular basis  He does not use oxygen or CPAP  He still is residing in nursing facility  He has no cough, wheeze or sputum production  He denies shortness of breath      Current Medications:    Current Outpatient Medications:     acetaminophen (TYLENOL) 500 mg tablet, Take 1 tablet (500 mg total) by mouth every 6 (six) hours as needed for mild pain, headaches or fever, Disp: 90 tablet, Rfl: 1    acyclovir (ZOVIRAX) 400 MG tablet, TAKE ONE TABLET BY MOUTH EVERY DAY, Disp: 30 tablet, Rfl: 5    albuterol (PROVENTIL HFA,VENTOLIN HFA) 90 mcg/act inhaler, Inhale 2 puffs every 6 (six) hours as needed for wheezing, Disp: 1 Inhaler, Rfl: 0    Alcohol Swabs (ALCOHOL PREP) 70 % PADS, , Disp: , Rfl: 0    allopurinol (ZYLOPRIM) 300 mg tablet, TAKE 1 TABLET BY MOUTH DAILY, Disp: 90 tablet, Rfl: 1    ammonium lactate (LAC-HYDRIN) 12 % lotion, , Disp: , Rfl: 99    atorvastatin (LIPITOR) 20 mg tablet, TAKE 1 TABLET BY MOUTH ONCE DAILY, Disp: 90 tablet, Rfl: 0    BD AUTOSHIELD DUO 30G X 5 MM MISC, USE AS DIRECTED WITH INSULIN FOUR TIMES A DAY, Disp: 100 each, Rfl: 3    BD INSULIN SYRINGE U/F 31G X 5/16" 0 5 ML MISC, USE AS DIRECTED WITH NOVOLIN 70/30, Disp: , Rfl: 0    BD PEN NEEDLE HILARIO U/F 32G X 4 MM MISC, by Other route 3 (three) times a day, Disp: 300 each, Rfl: 1    bisacodyl (bisacodyl) 5 mg EC tablet, Take 5 mg by mouth daily as needed for constipation, Disp: , Rfl:     bisacodyl (DULCOLAX) 10 mg suppository, Insert 10 mg into the rectum as needed for constipation, Disp: , Rfl:     bortezomib (VELCADE), Infuse into a venous catheter once, Disp: , Rfl:     clotrimazole (LOTRIMIN) 1 % cream, APPLY TO BUTTOCK 2 TIMES DAILY, Disp: 45 g, Rfl: 3    Easy Touch Safety Lancets 28G MISC, USE 4 TIMES DAILY TO TEST BLOOD SUGAR, Disp: 100 each, Rfl: 5    fluticasone-salmeterol (ADVAIR DISKUS, WIXELA INHUB) 250-50 mcg/dose inhaler, Inhale 1 puff 2 (two) times a day Rinse mouth after use , Disp: 1 Inhaler, Rfl: 5    insulin glargine (LANTUS SOLOSTAR) 100 units/mL injection pen, Inject 22 Units under the skin daily, Disp: 5 pen, Rfl: 5    insulin lispro (HumaLOG) 100 units/mL injection, Inject 5 Units under the skin 3 (three) times a day with meals, Disp: , Rfl: 0    Insulin Pen Needle 31G X 5 MM MISC, by Does not apply route 2 (two) times a day for 50 days, Disp: 100 each, Rfl: 0    magnesium hydroxide (MILK OF MAGNESIA) 400 mg/5 mL oral suspension, Take 30 mL by mouth daily as needed for constipation, Disp: , Rfl:     Melatonin 5 MG TABS, TAKE ONE TABLET BY MOUTH EVERY NIGHT AT BEDTIME, Disp: 30 tablet, Rfl: 2    metFORMIN (GLUCOPHAGE) 1000 MG tablet, TAKE ONE TABLET BY MOUTH TWO TIMES A DAY, Disp: 60 tablet, Rfl: 2    metoprolol tartrate (LOPRESSOR) 25 mg tablet, TAKE 1 TABLET BY MOUTH EVERY 12 HOURS, Disp: 180 tablet, Rfl: 3   NOVOLOG FLEXPEN 100 units/mL SOPN, INJ 5U BEFORE MEALS AND PER SS <250=NO CALL 250-300=5U,301-350= 10U,351-400=15U,401-450=20U>451 CALL FOR ORDERS UP TO 4X PER DAY, Disp: 15 mL, Rfl: 0    torsemide (DEMADEX) 10 mg tablet, TAKE 2 TABLETS BY MOUTH TWO TIMES A DAY, Disp: 120 tablet, Rfl: 2    triamcinolone (KENALOG) 0 1 % cream, Apply topically 2 (two) times a day, Disp: 30 g, Rfl: 0    warfarin (COUMADIN) 7 5 mg tablet, 7 5 mg on August 12th and 13th then INR on August 14th (Patient taking differently: Take 5 mg by mouth daily Takes 1 5 tabs at HS every Sunday, Tuesday, Thursday  Takes 2 tabs HS every Monday, Wednesday, Friday and Saturday), Disp: 30 tablet, Rfl: 0    Review of Systems:  Review of Systems   Constitutional: Negative for fatigue, fever and unexpected weight change  Respiratory: Negative for cough  Cardiovascular: Negative for chest pain  Neurological: Negative for headaches  Past medical history, surgical history, and family history were reviewed and updated as appropriate    Social history updates:  Social History     Tobacco Use   Smoking Status Never Smoker   Smokeless Tobacco Never Used       PhysicalExamination:  Patient was evaluated telephonically, no physical examination could be performed    Diagnostic Data:  Labs: I personally reviewed the most recent laboratory data pertinent to today's visit    Lab Results   Component Value Date    WBC 9 67 01/17/2020    HGB 12 9 01/17/2020    HCT 42 1 01/17/2020    MCV 89 01/17/2020     01/17/2020     Lab Results   Component Value Date    SODIUM 141 01/17/2020    K 3 9 01/17/2020    CO2 29 01/17/2020     01/17/2020    BUN 38 (H) 01/17/2020    CREATININE 1 28 01/17/2020    CALCIUM 9 0 01/17/2020       PFT results: The most recent pulmonary function tests were reviewed    6/5/19  FEV1/FVC ratio 64%  FEV1 46% predicted  FVC 53% predicted  TLC 90% predicted  % predicted  GEORGE of 58% predicted    Imaging:  I personally reviewed the images on the [facility] system pertinent to today's visit  Chest x-ray from 12/4/19 shows mild cardiac enlargement and left basilar atelectasis      Oren Sethi DO

## 2020-03-30 NOTE — ASSESSMENT & PLAN NOTE
Patient's asthma is well controlled with Advair twice daily  He is rinsing his mouth out after each use  He is not needing rescue therapy on any regular basis  He can follow-up with me in 6 months or sooner if needed

## 2020-04-01 ENCOUNTER — ANTICOAG VISIT (OUTPATIENT)
Dept: CARDIOLOGY CLINIC | Facility: CLINIC | Age: 70
End: 2020-04-01

## 2020-04-01 DIAGNOSIS — I48.20 CHRONIC ATRIAL FIBRILLATION (HCC): ICD-10-CM

## 2020-04-01 LAB — INR PPP: 3.5 (ref 0.84–1.19)

## 2020-04-03 RX ORDER — DEXAMETHASONE 4 MG/1
20 TABLET ORAL ONCE
Status: CANCELLED
Start: 2020-04-06

## 2020-04-06 ENCOUNTER — HOSPITAL ENCOUNTER (OUTPATIENT)
Dept: INFUSION CENTER | Facility: HOSPITAL | Age: 70
Discharge: HOME | End: 2020-04-06
Attending: INTERNAL MEDICINE
Payer: COMMERCIAL

## 2020-04-06 VITALS
TEMPERATURE: 98.2 F | HEIGHT: 75 IN | HEART RATE: 60 BPM | WEIGHT: 315 LBS | RESPIRATION RATE: 18 BRPM | BODY MASS INDEX: 39.17 KG/M2 | DIASTOLIC BLOOD PRESSURE: 65 MMHG | OXYGEN SATURATION: 95 % | SYSTOLIC BLOOD PRESSURE: 156 MMHG

## 2020-04-06 DIAGNOSIS — C90.00 MULTIPLE MYELOMA NOT HAVING ACHIEVED REMISSION (HCC): Primary | Chronic | ICD-10-CM

## 2020-04-06 PROCEDURE — 96401 CHEMO ANTI-NEOPL SQ/IM: CPT

## 2020-04-06 RX ORDER — DEXAMETHASONE 4 MG/1
20 TABLET ORAL ONCE
Status: COMPLETED | OUTPATIENT
Start: 2020-04-06 | End: 2020-04-06

## 2020-04-06 RX ADMIN — DEXAMETHASONE 20 MG: 4 TABLET ORAL at 12:41

## 2020-04-06 RX ADMIN — BORTEZOMIB 3.5 MG: 3.5 INJECTION, POWDER, LYOPHILIZED, FOR SOLUTION INTRAVENOUS; SUBCUTANEOUS at 13:07

## 2020-04-08 ENCOUNTER — ANTICOAG VISIT (OUTPATIENT)
Dept: CARDIOLOGY CLINIC | Facility: CLINIC | Age: 70
End: 2020-04-08

## 2020-04-08 DIAGNOSIS — I48.20 CHRONIC ATRIAL FIBRILLATION (HCC): ICD-10-CM

## 2020-04-08 LAB — INR PPP: 2.3 (ref 0.84–1.19)

## 2020-04-11 DIAGNOSIS — M1A.9XX0 CHRONIC GOUT WITHOUT TOPHUS, UNSPECIFIED CAUSE, UNSPECIFIED SITE: ICD-10-CM

## 2020-04-11 DIAGNOSIS — I10 ESSENTIAL HYPERTENSION: ICD-10-CM

## 2020-04-11 RX ORDER — ALLOPURINOL 300 MG/1
TABLET ORAL
Qty: 30 TABLET | Refills: 5 | Status: ON HOLD | OUTPATIENT
Start: 2020-04-11

## 2020-04-13 ENCOUNTER — TELEPHONE (OUTPATIENT)
Dept: FAMILY MEDICINE CLINIC | Facility: CLINIC | Age: 70
End: 2020-04-13

## 2020-04-13 DIAGNOSIS — E11.65 UNCONTROLLED TYPE 2 DIABETES MELLITUS WITH HYPERGLYCEMIA (HCC): ICD-10-CM

## 2020-04-13 RX ORDER — INSULIN ASPART 100 [IU]/ML
INJECTION, SOLUTION INTRAVENOUS; SUBCUTANEOUS
Qty: 15 ML | Refills: 5 | Status: ON HOLD | OUTPATIENT
Start: 2020-04-13

## 2020-04-15 DIAGNOSIS — G47.00 INSOMNIA, UNSPECIFIED TYPE: ICD-10-CM

## 2020-04-15 DIAGNOSIS — E11.641 UNCONTROLLED TYPE 2 DIABETES MELLITUS WITH HYPOGLYCEMIA AND COMA (HCC): ICD-10-CM

## 2020-04-16 RX ORDER — CHOLECALCIFEROL (VITAMIN D3) 125 MCG
CAPSULE ORAL
Qty: 30 TABLET | Refills: 2 | Status: ON HOLD | OUTPATIENT
Start: 2020-04-16

## 2020-04-17 RX ORDER — DEXAMETHASONE 4 MG/1
20 TABLET ORAL ONCE
Status: CANCELLED
Start: 2020-05-11

## 2020-04-17 RX ORDER — DEXAMETHASONE 4 MG/1
20 TABLET ORAL ONCE
Status: CANCELLED
Start: 2020-04-20

## 2020-04-17 RX ORDER — DEXAMETHASONE 4 MG/1
20 TABLET ORAL ONCE
Status: CANCELLED
Start: 2020-05-04

## 2020-04-17 RX ORDER — DEXAMETHASONE 4 MG/1
20 TABLET ORAL ONCE
Status: CANCELLED
Start: 2020-04-27

## 2020-04-20 ENCOUNTER — TELEPHONE (OUTPATIENT)
Dept: OTHER | Facility: OTHER | Age: 70
End: 2020-04-20

## 2020-04-20 ENCOUNTER — ANTICOAG VISIT (OUTPATIENT)
Dept: CARDIOLOGY CLINIC | Facility: CLINIC | Age: 70
End: 2020-04-20

## 2020-04-20 ENCOUNTER — HOSPITAL ENCOUNTER (OUTPATIENT)
Dept: INFUSION CENTER | Facility: HOSPITAL | Age: 70
Discharge: HOME | End: 2020-04-20
Attending: INTERNAL MEDICINE
Payer: COMMERCIAL

## 2020-04-20 VITALS
TEMPERATURE: 98.8 F | WEIGHT: 315 LBS | HEART RATE: 74 BPM | OXYGEN SATURATION: 94 % | DIASTOLIC BLOOD PRESSURE: 78 MMHG | RESPIRATION RATE: 18 BRPM | BODY MASS INDEX: 39.17 KG/M2 | SYSTOLIC BLOOD PRESSURE: 145 MMHG | HEIGHT: 75 IN

## 2020-04-20 DIAGNOSIS — C90.00 MULTIPLE MYELOMA NOT HAVING ACHIEVED REMISSION (HCC): Primary | ICD-10-CM

## 2020-04-20 DIAGNOSIS — I48.20 CHRONIC ATRIAL FIBRILLATION (HCC): ICD-10-CM

## 2020-04-20 LAB — INR PPP: 3.4 (ref 0.84–1.19)

## 2020-04-20 PROCEDURE — 96401 CHEMO ANTI-NEOPL SQ/IM: CPT

## 2020-04-20 RX ORDER — DEXAMETHASONE 4 MG/1
20 TABLET ORAL ONCE
Status: COMPLETED | OUTPATIENT
Start: 2020-04-20 | End: 2020-04-20

## 2020-04-20 RX ORDER — ACYCLOVIR 400 MG/1
TABLET ORAL
COMMUNITY
Start: 2020-04-15 | End: 2020-07-14 | Stop reason: HOSPADM

## 2020-04-20 RX ADMIN — BORTEZOMIB 3.5 MG: 3.5 INJECTION, POWDER, LYOPHILIZED, FOR SOLUTION INTRAVENOUS; SUBCUTANEOUS at 11:50

## 2020-04-20 RX ADMIN — DEXAMETHASONE 20 MG: 4 TABLET ORAL at 11:27

## 2020-04-21 ENCOUNTER — TELEPHONE (OUTPATIENT)
Dept: FAMILY MEDICINE CLINIC | Facility: CLINIC | Age: 70
End: 2020-04-21

## 2020-04-27 ENCOUNTER — ANTICOAG VISIT (OUTPATIENT)
Dept: CARDIOLOGY CLINIC | Facility: CLINIC | Age: 70
End: 2020-04-27

## 2020-04-27 ENCOUNTER — HOSPITAL ENCOUNTER (OUTPATIENT)
Dept: INFUSION CENTER | Facility: HOSPITAL | Age: 70
Discharge: HOME | End: 2020-04-27
Attending: INTERNAL MEDICINE
Payer: COMMERCIAL

## 2020-04-27 VITALS
OXYGEN SATURATION: 97 % | DIASTOLIC BLOOD PRESSURE: 89 MMHG | RESPIRATION RATE: 18 BRPM | TEMPERATURE: 98.3 F | WEIGHT: 315 LBS | HEART RATE: 61 BPM | HEIGHT: 75 IN | BODY MASS INDEX: 39.17 KG/M2 | SYSTOLIC BLOOD PRESSURE: 152 MMHG

## 2020-04-27 DIAGNOSIS — I48.20 CHRONIC ATRIAL FIBRILLATION (HCC): ICD-10-CM

## 2020-04-27 DIAGNOSIS — C90.00 MULTIPLE MYELOMA NOT HAVING ACHIEVED REMISSION (HCC): Primary | ICD-10-CM

## 2020-04-27 LAB — INR PPP: 2.5 (ref 0.84–1.19)

## 2020-04-27 PROCEDURE — 96401 CHEMO ANTI-NEOPL SQ/IM: CPT

## 2020-04-27 RX ORDER — DEXAMETHASONE 4 MG/1
20 TABLET ORAL ONCE
Status: COMPLETED | OUTPATIENT
Start: 2020-04-27 | End: 2020-04-27

## 2020-04-27 RX ADMIN — BORTEZOMIB 3.5 MG: 3.5 INJECTION, POWDER, LYOPHILIZED, FOR SOLUTION INTRAVENOUS; SUBCUTANEOUS at 14:33

## 2020-04-27 RX ADMIN — DEXAMETHASONE 20 MG: 4 TABLET ORAL at 14:21

## 2020-04-28 ENCOUNTER — TELEPHONE (OUTPATIENT)
Dept: FAMILY MEDICINE CLINIC | Facility: CLINIC | Age: 70
End: 2020-04-28

## 2020-04-30 ENCOUNTER — TELEMEDICINE (OUTPATIENT)
Dept: FAMILY MEDICINE CLINIC | Facility: CLINIC | Age: 70
End: 2020-04-30
Payer: COMMERCIAL

## 2020-04-30 VITALS — HEIGHT: 75 IN | BODY MASS INDEX: 39.17 KG/M2 | WEIGHT: 315 LBS

## 2020-04-30 DIAGNOSIS — E11.641 UNCONTROLLED TYPE 2 DIABETES MELLITUS WITH HYPOGLYCEMIA AND COMA (HCC): ICD-10-CM

## 2020-04-30 DIAGNOSIS — S78.112A ABOVE KNEE AMPUTATION OF LEFT LOWER EXTREMITY (HCC): Primary | Chronic | ICD-10-CM

## 2020-04-30 DIAGNOSIS — E78.5 HYPERLIPIDEMIA LDL GOAL <70: ICD-10-CM

## 2020-04-30 DIAGNOSIS — I42.9 CARDIOMYOPATHY, UNSPECIFIED TYPE (HCC): Chronic | ICD-10-CM

## 2020-04-30 PROBLEM — M10.9 GOUT: Status: ACTIVE | Noted: 2019-11-06

## 2020-04-30 PROBLEM — I87.2 VENOUS INSUFFICIENCY (CHRONIC) (PERIPHERAL): Status: ACTIVE | Noted: 2020-04-30

## 2020-04-30 PROBLEM — R26.2 DIFFICULTY IN WALKING, NOT ELSEWHERE CLASSIFIED: Status: ACTIVE | Noted: 2020-04-30

## 2020-04-30 PROCEDURE — G2012 BRIEF CHECK IN BY MD/QHP: HCPCS | Performed by: FAMILY MEDICINE

## 2020-05-04 ENCOUNTER — DOCUMENTATION (OUTPATIENT)
Dept: FAMILY MEDICINE CLINIC | Facility: CLINIC | Age: 70
End: 2020-05-04

## 2020-05-04 ENCOUNTER — HOSPITAL ENCOUNTER (OUTPATIENT)
Dept: INFUSION CENTER | Facility: HOSPITAL | Age: 70
Discharge: HOME | End: 2020-05-04
Attending: INTERNAL MEDICINE
Payer: COMMERCIAL

## 2020-05-04 ENCOUNTER — NURSE TRIAGE (OUTPATIENT)
Dept: OTHER | Facility: OTHER | Age: 70
End: 2020-05-04

## 2020-05-04 VITALS
HEIGHT: 75 IN | TEMPERATURE: 98.3 F | OXYGEN SATURATION: 95 % | SYSTOLIC BLOOD PRESSURE: 133 MMHG | HEART RATE: 72 BPM | WEIGHT: 315 LBS | RESPIRATION RATE: 20 BRPM | BODY MASS INDEX: 39.17 KG/M2 | DIASTOLIC BLOOD PRESSURE: 73 MMHG

## 2020-05-04 DIAGNOSIS — C90.00 MULTIPLE MYELOMA NOT HAVING ACHIEVED REMISSION (HCC): Primary | ICD-10-CM

## 2020-05-04 PROCEDURE — 96401 CHEMO ANTI-NEOPL SQ/IM: CPT

## 2020-05-04 RX ORDER — DEXAMETHASONE 4 MG/1
20 TABLET ORAL ONCE
Status: COMPLETED | OUTPATIENT
Start: 2020-05-04 | End: 2020-05-04

## 2020-05-04 RX ADMIN — BORTEZOMIB 3.5 MG: 3.5 INJECTION, POWDER, LYOPHILIZED, FOR SOLUTION INTRAVENOUS; SUBCUTANEOUS at 13:58

## 2020-05-04 RX ADMIN — DEXAMETHASONE 20 MG: 4 TABLET ORAL at 13:38

## 2020-05-08 DIAGNOSIS — E78.5 HYPERLIPIDEMIA LDL GOAL <70: ICD-10-CM

## 2020-05-08 RX ORDER — ATORVASTATIN CALCIUM 20 MG/1
TABLET, FILM COATED ORAL
Qty: 30 TABLET | Refills: 5 | Status: SHIPPED | OUTPATIENT
Start: 2020-05-08 | End: 2020-07-14 | Stop reason: HOSPADM

## 2020-05-09 DIAGNOSIS — E11.641 UNCONTROLLED TYPE 2 DIABETES MELLITUS WITH HYPOGLYCEMIA AND COMA (HCC): ICD-10-CM

## 2020-05-09 RX ORDER — PEN NEEDLE, DIABETIC, SAFETY 30 GX3/16"
NEEDLE, DISPOSABLE MISCELLANEOUS
Qty: 100 EACH | Refills: 3 | Status: ON HOLD | OUTPATIENT
Start: 2020-05-09

## 2020-05-11 ENCOUNTER — HOSPITAL ENCOUNTER (OUTPATIENT)
Dept: INFUSION CENTER | Facility: HOSPITAL | Age: 70
Discharge: HOME | End: 2020-05-11
Attending: INTERNAL MEDICINE
Payer: COMMERCIAL

## 2020-05-11 ENCOUNTER — ANTICOAG VISIT (OUTPATIENT)
Dept: CARDIOLOGY CLINIC | Facility: CLINIC | Age: 70
End: 2020-05-11

## 2020-05-11 ENCOUNTER — TELEPHONE (OUTPATIENT)
Dept: OTHER | Facility: OTHER | Age: 70
End: 2020-05-11

## 2020-05-11 VITALS
SYSTOLIC BLOOD PRESSURE: 152 MMHG | HEIGHT: 75 IN | RESPIRATION RATE: 18 BRPM | OXYGEN SATURATION: 97 % | HEART RATE: 65 BPM | BODY MASS INDEX: 39.17 KG/M2 | WEIGHT: 315 LBS | TEMPERATURE: 98.2 F | DIASTOLIC BLOOD PRESSURE: 71 MMHG

## 2020-05-11 DIAGNOSIS — C90.00 MULTIPLE MYELOMA NOT HAVING ACHIEVED REMISSION (HCC): Primary | ICD-10-CM

## 2020-05-11 DIAGNOSIS — I48.20 CHRONIC ATRIAL FIBRILLATION (HCC): ICD-10-CM

## 2020-05-11 LAB — INR PPP: 2.1 (ref 0.84–1.19)

## 2020-05-11 PROCEDURE — 96401 CHEMO ANTI-NEOPL SQ/IM: CPT

## 2020-05-11 RX ORDER — DEXAMETHASONE 4 MG/1
20 TABLET ORAL ONCE
Status: COMPLETED | OUTPATIENT
Start: 2020-05-11 | End: 2020-05-11

## 2020-05-11 RX ADMIN — BORTEZOMIB 3.5 MG: 3.5 INJECTION, POWDER, LYOPHILIZED, FOR SOLUTION INTRAVENOUS; SUBCUTANEOUS at 12:48

## 2020-05-11 RX ADMIN — DEXAMETHASONE 20 MG: 4 TABLET ORAL at 12:28

## 2020-05-14 DIAGNOSIS — I87.2 VENOUS STASIS DERMATITIS, UNSPECIFIED LATERALITY: Primary | ICD-10-CM

## 2020-05-14 RX ORDER — AMMONIUM LACTATE 12 G/100G
LOTION TOPICAL
Qty: 400 G | Refills: 2 | Status: ON HOLD | OUTPATIENT
Start: 2020-05-14

## 2020-05-15 DIAGNOSIS — R60.0 LOCALIZED EDEMA: ICD-10-CM

## 2020-05-15 RX ORDER — TORSEMIDE 10 MG/1
TABLET ORAL
Qty: 120 TABLET | Refills: 2 | Status: SHIPPED | OUTPATIENT
Start: 2020-05-15 | End: 2021-10-16 | Stop reason: HOSPADM

## 2020-05-22 RX ORDER — DEXAMETHASONE 4 MG/1
20 TABLET ORAL ONCE
Status: CANCELLED
Start: 2020-06-01

## 2020-05-22 RX ORDER — DEXAMETHASONE 4 MG/1
20 TABLET ORAL ONCE
Status: CANCELLED
Start: 2020-06-15

## 2020-05-22 RX ORDER — DEXAMETHASONE 4 MG/1
20 TABLET ORAL ONCE
Status: CANCELLED
Start: 2020-06-08

## 2020-05-22 RX ORDER — DEXAMETHASONE 4 MG/1
20 TABLET ORAL ONCE
Status: CANCELLED
Start: 2020-05-26

## 2020-05-26 ENCOUNTER — ANTICOAG VISIT (OUTPATIENT)
Dept: CARDIOLOGY CLINIC | Facility: CLINIC | Age: 70
End: 2020-05-26

## 2020-05-26 ENCOUNTER — HOSPITAL ENCOUNTER (OUTPATIENT)
Dept: INFUSION CENTER | Facility: HOSPITAL | Age: 70
Discharge: HOME | End: 2020-05-26
Attending: INTERNAL MEDICINE
Payer: COMMERCIAL

## 2020-05-26 ENCOUNTER — TELEPHONE (OUTPATIENT)
Dept: CARDIOLOGY CLINIC | Facility: CLINIC | Age: 70
End: 2020-05-26

## 2020-05-26 VITALS
HEART RATE: 69 BPM | TEMPERATURE: 98.3 F | OXYGEN SATURATION: 95 % | HEIGHT: 75 IN | RESPIRATION RATE: 20 BRPM | DIASTOLIC BLOOD PRESSURE: 75 MMHG | BODY MASS INDEX: 39.17 KG/M2 | SYSTOLIC BLOOD PRESSURE: 155 MMHG | WEIGHT: 315 LBS

## 2020-05-26 DIAGNOSIS — I48.20 CHRONIC ATRIAL FIBRILLATION (HCC): Primary | Chronic | ICD-10-CM

## 2020-05-26 DIAGNOSIS — C90.00 MULTIPLE MYELOMA NOT HAVING ACHIEVED REMISSION (HCC): Primary | ICD-10-CM

## 2020-05-26 DIAGNOSIS — I48.20 CHRONIC ATRIAL FIBRILLATION (HCC): ICD-10-CM

## 2020-05-26 LAB — INR PPP: 2.1 (ref 0.84–1.19)

## 2020-05-26 PROCEDURE — 96401 CHEMO ANTI-NEOPL SQ/IM: CPT

## 2020-05-26 RX ORDER — DEXAMETHASONE 4 MG/1
20 TABLET ORAL ONCE
Status: COMPLETED | OUTPATIENT
Start: 2020-05-26 | End: 2020-05-26

## 2020-05-26 RX ADMIN — DEXAMETHASONE 20 MG: 4 TABLET ORAL at 12:44

## 2020-05-26 RX ADMIN — BORTEZOMIB 3.5 MG: 3.5 INJECTION, POWDER, LYOPHILIZED, FOR SOLUTION INTRAVENOUS; SUBCUTANEOUS at 13:05

## 2020-05-27 ENCOUNTER — EVALUATION (OUTPATIENT)
Dept: PHYSICAL THERAPY | Facility: CLINIC | Age: 70
End: 2020-05-27
Payer: COMMERCIAL

## 2020-05-27 ENCOUNTER — TELEPHONE (OUTPATIENT)
Dept: FAMILY MEDICINE CLINIC | Facility: CLINIC | Age: 70
End: 2020-05-27

## 2020-05-27 DIAGNOSIS — S78.112A ABOVE KNEE AMPUTATION OF LEFT LOWER EXTREMITY (HCC): Primary | ICD-10-CM

## 2020-05-27 DIAGNOSIS — S78.112A ABOVE KNEE AMPUTATION OF LEFT LOWER EXTREMITY (HCC): Chronic | ICD-10-CM

## 2020-05-27 PROCEDURE — 97162 PT EVAL MOD COMPLEX 30 MIN: CPT | Performed by: PHYSICAL THERAPIST

## 2020-05-28 ENCOUNTER — TELEPHONE (OUTPATIENT)
Dept: HEMATOLOGY ONCOLOGY | Facility: CLINIC | Age: 70
End: 2020-05-28

## 2020-05-29 DIAGNOSIS — C90.00 MULTIPLE MYELOMA NOT HAVING ACHIEVED REMISSION (HCC): Primary | ICD-10-CM

## 2020-06-01 ENCOUNTER — OFFICE VISIT (OUTPATIENT)
Dept: HEMATOLOGY ONCOLOGY | Facility: HOSPITAL | Age: 70
End: 2020-06-01
Payer: COMMERCIAL

## 2020-06-01 ENCOUNTER — HOSPITAL ENCOUNTER (OUTPATIENT)
Dept: INFUSION CENTER | Facility: HOSPITAL | Age: 70
Discharge: HOME | End: 2020-06-01
Attending: INTERNAL MEDICINE
Payer: COMMERCIAL

## 2020-06-01 VITALS
OXYGEN SATURATION: 96 % | SYSTOLIC BLOOD PRESSURE: 148 MMHG | DIASTOLIC BLOOD PRESSURE: 65 MMHG | TEMPERATURE: 98.5 F | HEART RATE: 64 BPM | RESPIRATION RATE: 18 BRPM | WEIGHT: 315 LBS | BODY MASS INDEX: 39.17 KG/M2 | HEIGHT: 75 IN

## 2020-06-01 VITALS
SYSTOLIC BLOOD PRESSURE: 126 MMHG | TEMPERATURE: 98.6 F | HEART RATE: 74 BPM | DIASTOLIC BLOOD PRESSURE: 68 MMHG | OXYGEN SATURATION: 96 % | RESPIRATION RATE: 16 BRPM

## 2020-06-01 DIAGNOSIS — C90.00 MULTIPLE MYELOMA NOT HAVING ACHIEVED REMISSION (HCC): Primary | ICD-10-CM

## 2020-06-01 LAB
ALBUMIN SERPL BCP-MCNC: 2.9 G/DL (ref 3.5–5)
ALP SERPL-CCNC: 161 U/L (ref 46–116)
ALT SERPL W P-5'-P-CCNC: 22 U/L (ref 12–78)
ANION GAP SERPL CALCULATED.3IONS-SCNC: 9 MMOL/L (ref 4–13)
AST SERPL W P-5'-P-CCNC: 19 U/L (ref 5–45)
BASOPHILS # BLD AUTO: 0.05 THOUSANDS/ΜL (ref 0–0.1)
BASOPHILS NFR BLD AUTO: 1 % (ref 0–1)
BILIRUB SERPL-MCNC: 0.4 MG/DL (ref 0.2–1)
BUN SERPL-MCNC: 27 MG/DL (ref 5–25)
CALCIUM SERPL-MCNC: 8.4 MG/DL (ref 8.3–10.1)
CHLORIDE SERPL-SCNC: 104 MMOL/L (ref 100–108)
CO2 SERPL-SCNC: 28 MMOL/L (ref 21–32)
CREAT SERPL-MCNC: 1.2 MG/DL (ref 0.6–1.3)
EOSINOPHIL # BLD AUTO: 0.27 THOUSAND/ΜL (ref 0–0.61)
EOSINOPHIL NFR BLD AUTO: 4 % (ref 0–6)
ERYTHROCYTE [DISTWIDTH] IN BLOOD BY AUTOMATED COUNT: 14.9 % (ref 11.6–15.1)
GFR SERPL CREATININE-BSD FRML MDRD: 62 ML/MIN/1.73SQ M
GLUCOSE SERPL-MCNC: 163 MG/DL (ref 65–140)
HCT VFR BLD AUTO: 37.4 % (ref 36.5–49.3)
HGB BLD-MCNC: 11.4 G/DL (ref 12–17)
IMM GRANULOCYTES # BLD AUTO: 0.12 THOUSAND/UL (ref 0–0.2)
IMM GRANULOCYTES NFR BLD AUTO: 2 % (ref 0–2)
LYMPHOCYTES # BLD AUTO: 1.29 THOUSANDS/ΜL (ref 0.6–4.47)
LYMPHOCYTES NFR BLD AUTO: 18 % (ref 14–44)
MCH RBC QN AUTO: 28.2 PG (ref 26.8–34.3)
MCHC RBC AUTO-ENTMCNC: 30.5 G/DL (ref 31.4–37.4)
MCV RBC AUTO: 93 FL (ref 82–98)
MONOCYTES # BLD AUTO: 0.58 THOUSAND/ΜL (ref 0.17–1.22)
MONOCYTES NFR BLD AUTO: 8 % (ref 4–12)
NEUTROPHILS # BLD AUTO: 4.81 THOUSANDS/ΜL (ref 1.85–7.62)
NEUTS SEG NFR BLD AUTO: 67 % (ref 43–75)
NRBC BLD AUTO-RTO: 0 /100 WBCS
PLATELET # BLD AUTO: 278 THOUSANDS/UL (ref 149–390)
PMV BLD AUTO: 11.3 FL (ref 8.9–12.7)
POTASSIUM SERPL-SCNC: 4.3 MMOL/L (ref 3.5–5.3)
PROT SERPL-MCNC: 6.7 G/DL (ref 6.4–8.2)
RBC # BLD AUTO: 4.04 MILLION/UL (ref 3.88–5.62)
SODIUM SERPL-SCNC: 141 MMOL/L (ref 136–145)
WBC # BLD AUTO: 7.12 THOUSAND/UL (ref 4.31–10.16)

## 2020-06-01 PROCEDURE — 4040F PNEUMOC VAC/ADMIN/RCVD: CPT | Performed by: INTERNAL MEDICINE

## 2020-06-01 PROCEDURE — 3074F SYST BP LT 130 MM HG: CPT | Performed by: INTERNAL MEDICINE

## 2020-06-01 PROCEDURE — 3078F DIAST BP <80 MM HG: CPT | Performed by: INTERNAL MEDICINE

## 2020-06-01 PROCEDURE — 80053 COMPREHEN METABOLIC PANEL: CPT

## 2020-06-01 PROCEDURE — 99214 OFFICE O/P EST MOD 30 MIN: CPT | Performed by: INTERNAL MEDICINE

## 2020-06-01 PROCEDURE — 3066F NEPHROPATHY DOC TX: CPT | Performed by: INTERNAL MEDICINE

## 2020-06-01 PROCEDURE — 85025 COMPLETE CBC W/AUTO DIFF WBC: CPT

## 2020-06-01 PROCEDURE — 1160F RVW MEDS BY RX/DR IN RCRD: CPT | Performed by: INTERNAL MEDICINE

## 2020-06-01 PROCEDURE — 1036F TOBACCO NON-USER: CPT | Performed by: INTERNAL MEDICINE

## 2020-06-01 PROCEDURE — 96401 CHEMO ANTI-NEOPL SQ/IM: CPT

## 2020-06-01 RX ORDER — DEXAMETHASONE 4 MG/1
20 TABLET ORAL ONCE
Status: CANCELLED
Start: 2020-06-29

## 2020-06-01 RX ORDER — DEXAMETHASONE 4 MG/1
20 TABLET ORAL ONCE
Status: CANCELLED
Start: 2020-07-21

## 2020-06-01 RX ORDER — DEXAMETHASONE 4 MG/1
20 TABLET ORAL ONCE
Status: CANCELLED
Start: 2020-07-07

## 2020-06-01 RX ORDER — DEXAMETHASONE 4 MG/1
20 TABLET ORAL ONCE
Status: CANCELLED
Start: 2020-07-14

## 2020-06-01 RX ORDER — DEXAMETHASONE 4 MG/1
20 TABLET ORAL ONCE
Status: COMPLETED | OUTPATIENT
Start: 2020-06-01 | End: 2020-06-01

## 2020-06-01 RX ADMIN — DEXAMETHASONE 20 MG: 4 TABLET ORAL at 10:00

## 2020-06-01 RX ADMIN — BORTEZOMIB 3.5 MG: 3.5 INJECTION, POWDER, LYOPHILIZED, FOR SOLUTION INTRAVENOUS; SUBCUTANEOUS at 10:13

## 2020-06-02 DIAGNOSIS — R21 RASH: ICD-10-CM

## 2020-06-02 RX ORDER — TRIAMCINOLONE ACETONIDE 1 MG/G
CREAM TOPICAL
Qty: 30 G | Refills: 2 | Status: SHIPPED | OUTPATIENT
Start: 2020-06-02 | End: 2020-09-16 | Stop reason: ALTCHOICE

## 2020-06-04 ENCOUNTER — TELEPHONE (OUTPATIENT)
Dept: OTHER | Facility: OTHER | Age: 70
End: 2020-06-04

## 2020-06-05 ENCOUNTER — OFFICE VISIT (OUTPATIENT)
Dept: PHYSICAL THERAPY | Facility: CLINIC | Age: 70
End: 2020-06-05
Payer: COMMERCIAL

## 2020-06-05 DIAGNOSIS — S78.112A ABOVE KNEE AMPUTATION OF LEFT LOWER EXTREMITY (HCC): Primary | ICD-10-CM

## 2020-06-05 PROCEDURE — 97110 THERAPEUTIC EXERCISES: CPT | Performed by: PHYSICAL THERAPIST

## 2020-06-05 PROCEDURE — 97140 MANUAL THERAPY 1/> REGIONS: CPT | Performed by: PHYSICAL THERAPIST

## 2020-06-08 ENCOUNTER — TELEPHONE (OUTPATIENT)
Dept: NEUROLOGY | Facility: CLINIC | Age: 70
End: 2020-06-08

## 2020-06-08 ENCOUNTER — HOSPITAL ENCOUNTER (OUTPATIENT)
Dept: INFUSION CENTER | Facility: HOSPITAL | Age: 70
Discharge: HOME | End: 2020-06-08
Attending: INTERNAL MEDICINE
Payer: COMMERCIAL

## 2020-06-08 VITALS
SYSTOLIC BLOOD PRESSURE: 153 MMHG | OXYGEN SATURATION: 98 % | RESPIRATION RATE: 17 BRPM | HEIGHT: 75 IN | WEIGHT: 315 LBS | HEART RATE: 70 BPM | DIASTOLIC BLOOD PRESSURE: 64 MMHG | BODY MASS INDEX: 39.17 KG/M2

## 2020-06-08 DIAGNOSIS — C90.00 MULTIPLE MYELOMA NOT HAVING ACHIEVED REMISSION (HCC): Primary | ICD-10-CM

## 2020-06-08 PROCEDURE — 96401 CHEMO ANTI-NEOPL SQ/IM: CPT

## 2020-06-08 RX ORDER — DEXAMETHASONE 4 MG/1
20 TABLET ORAL ONCE
Status: COMPLETED | OUTPATIENT
Start: 2020-06-08 | End: 2020-06-08

## 2020-06-08 RX ADMIN — DEXAMETHASONE 20 MG: 4 TABLET ORAL at 09:52

## 2020-06-08 RX ADMIN — BORTEZOMIB 3.5 MG: 3.5 INJECTION, POWDER, LYOPHILIZED, FOR SOLUTION INTRAVENOUS; SUBCUTANEOUS at 10:28

## 2020-06-08 NOTE — TELEPHONE ENCOUNTER
LMOM letting patient know we are calling to schedule an appointment for him to see Dr Madelin Humphrey, we do have availability for Monday 6/15/20, [address] location   If patient can give us a call back we would greatly appreciate it

## 2020-06-08 NOTE — TELEPHONE ENCOUNTER
----- Message from Arnaud Hernandes, PT sent at 5/27/2020 12:57 PM EDT -----  Regarding: needs appt  Hey when you get a chance can you get this patient set up to see Dr Grant Schwartz or Dr Negar Pham? I know it won't be for a while, but I want to at least get him scheduled  He's an amputee and doesn't have a physiatrist   When you call him just let him know I told you to call to set this up because he needs an amputee doctor  He may be confused on why you're calling him, he has like no care right now so I'm lining up a lot of doctor's visits for him  Thank you! Let me know when you get him scheduled  Thanks!   V

## 2020-06-09 ENCOUNTER — OFFICE VISIT (OUTPATIENT)
Dept: FAMILY MEDICINE CLINIC | Facility: CLINIC | Age: 70
End: 2020-06-09
Payer: COMMERCIAL

## 2020-06-09 VITALS
TEMPERATURE: 98.5 F | HEART RATE: 64 BPM | BODY MASS INDEX: 42.8 KG/M2 | HEIGHT: 75 IN | DIASTOLIC BLOOD PRESSURE: 58 MMHG | RESPIRATION RATE: 20 BRPM | SYSTOLIC BLOOD PRESSURE: 126 MMHG

## 2020-06-09 DIAGNOSIS — S78.112A ABOVE KNEE AMPUTATION OF LEFT LOWER EXTREMITY (HCC): Chronic | ICD-10-CM

## 2020-06-09 DIAGNOSIS — E11.641 UNCONTROLLED TYPE 2 DIABETES MELLITUS WITH HYPOGLYCEMIA AND COMA (HCC): ICD-10-CM

## 2020-06-09 DIAGNOSIS — C90.00 MULTIPLE MYELOMA NOT HAVING ACHIEVED REMISSION (HCC): Chronic | ICD-10-CM

## 2020-06-09 DIAGNOSIS — Z12.11 SCREENING FOR COLON CANCER: ICD-10-CM

## 2020-06-09 DIAGNOSIS — I48.20 CHRONIC ATRIAL FIBRILLATION (HCC): Chronic | ICD-10-CM

## 2020-06-09 DIAGNOSIS — E78.5 HYPERLIPIDEMIA LDL GOAL <70: ICD-10-CM

## 2020-06-09 DIAGNOSIS — I10 ESSENTIAL HYPERTENSION: Chronic | ICD-10-CM

## 2020-06-09 DIAGNOSIS — E78.2 MIXED HYPERLIPIDEMIA: Chronic | ICD-10-CM

## 2020-06-09 DIAGNOSIS — M1A.9XX0 CHRONIC GOUT WITHOUT TOPHUS, UNSPECIFIED CAUSE, UNSPECIFIED SITE: ICD-10-CM

## 2020-06-09 DIAGNOSIS — Z00.00 MEDICARE ANNUAL WELLNESS VISIT, SUBSEQUENT: Primary | ICD-10-CM

## 2020-06-09 DIAGNOSIS — Z12.5 SCREENING FOR PROSTATE CANCER: ICD-10-CM

## 2020-06-09 PROCEDURE — 1160F RVW MEDS BY RX/DR IN RCRD: CPT | Performed by: FAMILY MEDICINE

## 2020-06-09 PROCEDURE — 3074F SYST BP LT 130 MM HG: CPT | Performed by: FAMILY MEDICINE

## 2020-06-09 PROCEDURE — 99214 OFFICE O/P EST MOD 30 MIN: CPT | Performed by: FAMILY MEDICINE

## 2020-06-09 PROCEDURE — 1125F AMNT PAIN NOTED PAIN PRSNT: CPT | Performed by: FAMILY MEDICINE

## 2020-06-09 PROCEDURE — 1036F TOBACCO NON-USER: CPT | Performed by: FAMILY MEDICINE

## 2020-06-09 PROCEDURE — 1170F FXNL STATUS ASSESSED: CPT | Performed by: FAMILY MEDICINE

## 2020-06-09 PROCEDURE — 3066F NEPHROPATHY DOC TX: CPT | Performed by: FAMILY MEDICINE

## 2020-06-09 PROCEDURE — 4040F PNEUMOC VAC/ADMIN/RCVD: CPT | Performed by: FAMILY MEDICINE

## 2020-06-09 PROCEDURE — 3078F DIAST BP <80 MM HG: CPT | Performed by: FAMILY MEDICINE

## 2020-06-09 PROCEDURE — G0439 PPPS, SUBSEQ VISIT: HCPCS | Performed by: FAMILY MEDICINE

## 2020-06-10 ENCOUNTER — OFFICE VISIT (OUTPATIENT)
Dept: PHYSICAL THERAPY | Facility: CLINIC | Age: 70
End: 2020-06-10
Payer: COMMERCIAL

## 2020-06-10 DIAGNOSIS — S78.112A ABOVE KNEE AMPUTATION OF LEFT LOWER EXTREMITY (HCC): Primary | ICD-10-CM

## 2020-06-10 PROCEDURE — 97140 MANUAL THERAPY 1/> REGIONS: CPT | Performed by: PHYSICAL THERAPIST

## 2020-06-10 PROCEDURE — 97110 THERAPEUTIC EXERCISES: CPT | Performed by: PHYSICAL THERAPIST

## 2020-06-10 PROCEDURE — 97116 GAIT TRAINING THERAPY: CPT | Performed by: PHYSICAL THERAPIST

## 2020-06-15 ENCOUNTER — TELEPHONE (OUTPATIENT)
Dept: OTHER | Facility: OTHER | Age: 70
End: 2020-06-15

## 2020-06-15 ENCOUNTER — HOSPITAL ENCOUNTER (OUTPATIENT)
Dept: INFUSION CENTER | Facility: HOSPITAL | Age: 70
Discharge: HOME | End: 2020-06-15
Attending: INTERNAL MEDICINE
Payer: COMMERCIAL

## 2020-06-15 VITALS
OXYGEN SATURATION: 94 % | TEMPERATURE: 97.7 F | RESPIRATION RATE: 18 BRPM | DIASTOLIC BLOOD PRESSURE: 66 MMHG | WEIGHT: 315 LBS | BODY MASS INDEX: 39.17 KG/M2 | HEIGHT: 75 IN | HEART RATE: 61 BPM | SYSTOLIC BLOOD PRESSURE: 133 MMHG

## 2020-06-15 DIAGNOSIS — C90.00 MULTIPLE MYELOMA NOT HAVING ACHIEVED REMISSION (HCC): Primary | ICD-10-CM

## 2020-06-15 PROCEDURE — 96401 CHEMO ANTI-NEOPL SQ/IM: CPT

## 2020-06-15 RX ORDER — DEXAMETHASONE 4 MG/1
20 TABLET ORAL ONCE
Status: COMPLETED | OUTPATIENT
Start: 2020-06-15 | End: 2020-06-15

## 2020-06-15 RX ADMIN — BORTEZOMIB 3.5 MG: 3.5 INJECTION, POWDER, LYOPHILIZED, FOR SOLUTION INTRAVENOUS; SUBCUTANEOUS at 11:33

## 2020-06-15 RX ADMIN — DEXAMETHASONE 20 MG: 4 TABLET ORAL at 11:10

## 2020-06-16 ENCOUNTER — ANTICOAG VISIT (OUTPATIENT)
Dept: CARDIOLOGY CLINIC | Facility: CLINIC | Age: 70
End: 2020-06-16

## 2020-06-16 DIAGNOSIS — I48.20 CHRONIC ATRIAL FIBRILLATION (HCC): ICD-10-CM

## 2020-06-16 LAB
INR PPP: 2.7 (ref 0.84–1.19)
INR PPP: 2.7 (ref 0.84–1.19)

## 2020-06-17 ENCOUNTER — APPOINTMENT (OUTPATIENT)
Dept: PHYSICAL THERAPY | Facility: CLINIC | Age: 70
End: 2020-06-17
Payer: COMMERCIAL

## 2020-06-18 ENCOUNTER — APPOINTMENT (OUTPATIENT)
Dept: PHYSICAL THERAPY | Facility: CLINIC | Age: 70
End: 2020-06-18
Payer: COMMERCIAL

## 2020-06-22 ENCOUNTER — OFFICE VISIT (OUTPATIENT)
Dept: PHYSICAL THERAPY | Facility: CLINIC | Age: 70
End: 2020-06-22
Payer: COMMERCIAL

## 2020-06-22 DIAGNOSIS — S78.112A ABOVE KNEE AMPUTATION OF LEFT LOWER EXTREMITY (HCC): Primary | ICD-10-CM

## 2020-06-22 PROCEDURE — 97110 THERAPEUTIC EXERCISES: CPT | Performed by: PHYSICAL THERAPIST

## 2020-06-22 PROCEDURE — 97112 NEUROMUSCULAR REEDUCATION: CPT | Performed by: PHYSICAL THERAPIST

## 2020-06-22 PROCEDURE — 97140 MANUAL THERAPY 1/> REGIONS: CPT | Performed by: PHYSICAL THERAPIST

## 2020-06-22 PROCEDURE — 97530 THERAPEUTIC ACTIVITIES: CPT | Performed by: PHYSICAL THERAPIST

## 2020-06-23 ENCOUNTER — TELEPHONE (OUTPATIENT)
Dept: HEMATOLOGY ONCOLOGY | Facility: CLINIC | Age: 70
End: 2020-06-23

## 2020-06-24 ENCOUNTER — OFFICE VISIT (OUTPATIENT)
Dept: PHYSICAL THERAPY | Facility: CLINIC | Age: 70
End: 2020-06-24
Payer: COMMERCIAL

## 2020-06-24 DIAGNOSIS — S78.112A ABOVE KNEE AMPUTATION OF LEFT LOWER EXTREMITY (HCC): Primary | ICD-10-CM

## 2020-06-24 PROCEDURE — 97530 THERAPEUTIC ACTIVITIES: CPT | Performed by: PHYSICAL THERAPIST

## 2020-06-24 PROCEDURE — 97112 NEUROMUSCULAR REEDUCATION: CPT | Performed by: PHYSICAL THERAPIST

## 2020-06-24 PROCEDURE — 97110 THERAPEUTIC EXERCISES: CPT | Performed by: PHYSICAL THERAPIST

## 2020-06-25 ENCOUNTER — APPOINTMENT (EMERGENCY)
Dept: RADIOLOGY | Facility: HOSPITAL | Age: 70
DRG: 871 | End: 2020-06-25
Payer: COMMERCIAL

## 2020-06-25 ENCOUNTER — HOSPITAL ENCOUNTER (INPATIENT)
Facility: HOSPITAL | Age: 70
LOS: 19 days | Discharge: NURSING, REHAB OR LONG-TERM CARE | DRG: 871 | End: 2020-07-14
Attending: EMERGENCY MEDICINE | Admitting: INTERNAL MEDICINE
Payer: COMMERCIAL

## 2020-06-25 DIAGNOSIS — S81.801A LEG WOUND, RIGHT: ICD-10-CM

## 2020-06-25 DIAGNOSIS — R77.8 ELEVATED TROPONIN LEVEL: ICD-10-CM

## 2020-06-25 DIAGNOSIS — U07.1 COVID-19 VIRUS INFECTION: Primary | ICD-10-CM

## 2020-06-25 DIAGNOSIS — R41.0 DELIRIUM: ICD-10-CM

## 2020-06-25 DIAGNOSIS — I87.2 VENOUS INSUFFICIENCY (CHRONIC) (PERIPHERAL): ICD-10-CM

## 2020-06-25 DIAGNOSIS — G47.00 INSOMNIA: ICD-10-CM

## 2020-06-25 DIAGNOSIS — R09.02 HYPOXIA: ICD-10-CM

## 2020-06-25 DIAGNOSIS — A41.9 SEPSIS (HCC): ICD-10-CM

## 2020-06-25 PROBLEM — J12.82 PNEUMONIA DUE TO COVID-19 VIRUS: Status: ACTIVE | Noted: 2020-06-25

## 2020-06-25 LAB
ALBUMIN SERPL BCP-MCNC: 2.9 G/DL (ref 3.5–5)
ALBUMIN SERPL BCP-MCNC: 3 G/DL (ref 3.5–5)
ALP SERPL-CCNC: 157 U/L (ref 46–116)
ALP SERPL-CCNC: 163 U/L (ref 46–116)
ALT SERPL W P-5'-P-CCNC: 27 U/L (ref 12–78)
ALT SERPL W P-5'-P-CCNC: 27 U/L (ref 12–78)
AMMONIA PLAS-SCNC: 19 UMOL/L (ref 11–35)
ANION GAP SERPL CALCULATED.3IONS-SCNC: 10 MMOL/L (ref 4–13)
ANION GAP SERPL CALCULATED.3IONS-SCNC: 12 MMOL/L (ref 4–13)
AST SERPL W P-5'-P-CCNC: 30 U/L (ref 5–45)
AST SERPL W P-5'-P-CCNC: 47 U/L (ref 5–45)
ATRIAL RATE: 105 BPM
BASOPHILS # BLD AUTO: 0.02 THOUSANDS/ΜL (ref 0–0.1)
BASOPHILS NFR BLD AUTO: 0 % (ref 0–1)
BILIRUB SERPL-MCNC: 1.06 MG/DL (ref 0.2–1)
BILIRUB SERPL-MCNC: 1.35 MG/DL (ref 0.2–1)
BUN SERPL-MCNC: 33 MG/DL (ref 5–25)
BUN SERPL-MCNC: 41 MG/DL (ref 5–25)
CA-I BLD-SCNC: 1.06 MMOL/L (ref 1.12–1.32)
CALCIUM SERPL-MCNC: 8 MG/DL (ref 8.3–10.1)
CALCIUM SERPL-MCNC: 8.1 MG/DL (ref 8.3–10.1)
CHLORIDE SERPL-SCNC: 101 MMOL/L (ref 100–108)
CHLORIDE SERPL-SCNC: 101 MMOL/L (ref 100–108)
CK MB SERPL-MCNC: 1.5 % (ref 0–2.5)
CK MB SERPL-MCNC: 4.1 NG/ML (ref 0–5)
CK SERPL-CCNC: 271 U/L (ref 39–308)
CO2 SERPL-SCNC: 22 MMOL/L (ref 21–32)
CO2 SERPL-SCNC: 25 MMOL/L (ref 21–32)
CREAT SERPL-MCNC: 1.47 MG/DL (ref 0.6–1.3)
CREAT SERPL-MCNC: 1.73 MG/DL (ref 0.6–1.3)
CRP SERPL QL: 163 MG/L
EOSINOPHIL # BLD AUTO: 0 THOUSAND/ΜL (ref 0–0.61)
EOSINOPHIL NFR BLD AUTO: 0 % (ref 0–6)
ERYTHROCYTE [DISTWIDTH] IN BLOOD BY AUTOMATED COUNT: 15.9 % (ref 11.6–15.1)
ERYTHROCYTE [DISTWIDTH] IN BLOOD BY AUTOMATED COUNT: 16 % (ref 11.6–15.1)
EST. AVERAGE GLUCOSE BLD GHB EST-MCNC: 206 MG/DL
FERRITIN SERPL-MCNC: 326 NG/ML (ref 8–388)
GFR SERPL CREATININE-BSD FRML MDRD: 40 ML/MIN/1.73SQ M
GFR SERPL CREATININE-BSD FRML MDRD: 49 ML/MIN/1.73SQ M
GLUCOSE SERPL-MCNC: 192 MG/DL (ref 65–140)
GLUCOSE SERPL-MCNC: 208 MG/DL (ref 65–140)
GLUCOSE SERPL-MCNC: 344 MG/DL (ref 65–140)
GLUCOSE SERPL-MCNC: 348 MG/DL (ref 65–140)
HBA1C MFR BLD HPLC: 9.3 %
HBA1C MFR BLD: 8.8 %
HCT VFR BLD AUTO: 36.5 % (ref 36.5–49.3)
HCT VFR BLD AUTO: 37.6 % (ref 36.5–49.3)
HGB BLD-MCNC: 10.9 G/DL (ref 12–17)
HGB BLD-MCNC: 11.7 G/DL (ref 12–17)
IMM GRANULOCYTES # BLD AUTO: 0.08 THOUSAND/UL (ref 0–0.2)
IMM GRANULOCYTES NFR BLD AUTO: 1 % (ref 0–2)
INR PPP: 1.48 (ref 0.84–1.19)
INR PPP: 1.63 (ref 0.84–1.19)
LACTATE SERPL-SCNC: 1.4 MMOL/L (ref 0.5–2)
LACTATE SERPL-SCNC: 1.6 MMOL/L (ref 0.5–2)
LACTATE SERPL-SCNC: 2.1 MMOL/L (ref 0.5–2)
LYMPHOCYTES # BLD AUTO: 0.79 THOUSANDS/ΜL (ref 0.6–4.47)
LYMPHOCYTES NFR BLD AUTO: 10 % (ref 14–44)
MAGNESIUM SERPL-MCNC: 1.7 MG/DL (ref 1.6–2.6)
MCH RBC QN AUTO: 27.8 PG (ref 26.8–34.3)
MCH RBC QN AUTO: 28.5 PG (ref 26.8–34.3)
MCHC RBC AUTO-ENTMCNC: 29.9 G/DL (ref 31.4–37.4)
MCHC RBC AUTO-ENTMCNC: 31.1 G/DL (ref 31.4–37.4)
MCV RBC AUTO: 92 FL (ref 82–98)
MCV RBC AUTO: 93 FL (ref 82–98)
MONOCYTES # BLD AUTO: 0.61 THOUSAND/ΜL (ref 0.17–1.22)
MONOCYTES NFR BLD AUTO: 8 % (ref 4–12)
NEUTROPHILS # BLD AUTO: 6.21 THOUSANDS/ΜL (ref 1.85–7.62)
NEUTS SEG NFR BLD AUTO: 81 % (ref 43–75)
NRBC BLD AUTO-RTO: 0 /100 WBCS
NT-PROBNP SERPL-MCNC: 4293 PG/ML
P AXIS: 0 DEGREES
PLATELET # BLD AUTO: 143 THOUSANDS/UL (ref 149–390)
PLATELET # BLD AUTO: 158 THOUSANDS/UL (ref 149–390)
PMV BLD AUTO: 11.3 FL (ref 8.9–12.7)
PMV BLD AUTO: 11.5 FL (ref 8.9–12.7)
POTASSIUM SERPL-SCNC: 3.9 MMOL/L (ref 3.5–5.3)
POTASSIUM SERPL-SCNC: 4.6 MMOL/L (ref 3.5–5.3)
PROCALCITONIN SERPL-MCNC: 1.42 NG/ML
PROCALCITONIN SERPL-MCNC: 2.77 NG/ML
PROT SERPL-MCNC: 6.5 G/DL (ref 6.4–8.2)
PROT SERPL-MCNC: 6.6 G/DL (ref 6.4–8.2)
PROTHROMBIN TIME: 17.9 SECONDS (ref 11.6–14.5)
PROTHROMBIN TIME: 19.3 SECONDS (ref 11.6–14.5)
QRS AXIS: 75 DEGREES
QRSD INTERVAL: 116 MS
QT INTERVAL: 340 MS
QTC INTERVAL: 438 MS
RBC # BLD AUTO: 3.92 MILLION/UL (ref 3.88–5.62)
RBC # BLD AUTO: 4.11 MILLION/UL (ref 3.88–5.62)
SARS-COV-2 RNA RESP QL NAA+PROBE: POSITIVE
SODIUM SERPL-SCNC: 135 MMOL/L (ref 136–145)
SODIUM SERPL-SCNC: 136 MMOL/L (ref 136–145)
T WAVE AXIS: 31 DEGREES
TRIGL SERPL-MCNC: 111 MG/DL
TROPONIN I SERPL-MCNC: 0.99 NG/ML
VENTRICULAR RATE: 100 BPM
WBC # BLD AUTO: 6.27 THOUSAND/UL (ref 4.31–10.16)
WBC # BLD AUTO: 7.71 THOUSAND/UL (ref 4.31–10.16)

## 2020-06-25 PROCEDURE — 82550 ASSAY OF CK (CPK): CPT | Performed by: EMERGENCY MEDICINE

## 2020-06-25 PROCEDURE — 83735 ASSAY OF MAGNESIUM: CPT | Performed by: EMERGENCY MEDICINE

## 2020-06-25 PROCEDURE — 96365 THER/PROPH/DIAG IV INF INIT: CPT

## 2020-06-25 PROCEDURE — 93005 ELECTROCARDIOGRAM TRACING: CPT

## 2020-06-25 PROCEDURE — 80053 COMPREHEN METABOLIC PANEL: CPT | Performed by: EMERGENCY MEDICINE

## 2020-06-25 PROCEDURE — 96375 TX/PRO/DX INJ NEW DRUG ADDON: CPT

## 2020-06-25 PROCEDURE — 83880 ASSAY OF NATRIURETIC PEPTIDE: CPT | Performed by: EMERGENCY MEDICINE

## 2020-06-25 PROCEDURE — 85610 PROTHROMBIN TIME: CPT | Performed by: INTERNAL MEDICINE

## 2020-06-25 PROCEDURE — 80053 COMPREHEN METABOLIC PANEL: CPT | Performed by: PHYSICIAN ASSISTANT

## 2020-06-25 PROCEDURE — 36415 COLL VENOUS BLD VENIPUNCTURE: CPT | Performed by: EMERGENCY MEDICINE

## 2020-06-25 PROCEDURE — 93010 ELECTROCARDIOGRAM REPORT: CPT | Performed by: INTERNAL MEDICINE

## 2020-06-25 PROCEDURE — 3052F HG A1C>EQUAL 8.0%<EQUAL 9.0%: CPT | Performed by: NURSE PRACTITIONER

## 2020-06-25 PROCEDURE — 85027 COMPLETE CBC AUTOMATED: CPT | Performed by: PHYSICIAN ASSISTANT

## 2020-06-25 PROCEDURE — 99285 EMERGENCY DEPT VISIT HI MDM: CPT

## 2020-06-25 PROCEDURE — 84484 ASSAY OF TROPONIN QUANT: CPT | Performed by: EMERGENCY MEDICINE

## 2020-06-25 PROCEDURE — 83605 ASSAY OF LACTIC ACID: CPT | Performed by: EMERGENCY MEDICINE

## 2020-06-25 PROCEDURE — 82330 ASSAY OF CALCIUM: CPT | Performed by: EMERGENCY MEDICINE

## 2020-06-25 PROCEDURE — 82728 ASSAY OF FERRITIN: CPT | Performed by: EMERGENCY MEDICINE

## 2020-06-25 PROCEDURE — 84145 PROCALCITONIN (PCT): CPT | Performed by: INTERNAL MEDICINE

## 2020-06-25 PROCEDURE — 71045 X-RAY EXAM CHEST 1 VIEW: CPT

## 2020-06-25 PROCEDURE — 83520 IMMUNOASSAY QUANT NOS NONAB: CPT | Performed by: EMERGENCY MEDICINE

## 2020-06-25 PROCEDURE — 82805 BLOOD GASES W/O2 SATURATION: CPT | Performed by: PHYSICIAN ASSISTANT

## 2020-06-25 PROCEDURE — 82948 REAGENT STRIP/BLOOD GLUCOSE: CPT

## 2020-06-25 PROCEDURE — 85610 PROTHROMBIN TIME: CPT | Performed by: EMERGENCY MEDICINE

## 2020-06-25 PROCEDURE — 87040 BLOOD CULTURE FOR BACTERIA: CPT | Performed by: EMERGENCY MEDICINE

## 2020-06-25 PROCEDURE — 84478 ASSAY OF TRIGLYCERIDES: CPT | Performed by: EMERGENCY MEDICINE

## 2020-06-25 PROCEDURE — 83605 ASSAY OF LACTIC ACID: CPT | Performed by: PHYSICIAN ASSISTANT

## 2020-06-25 PROCEDURE — 82553 CREATINE MB FRACTION: CPT | Performed by: EMERGENCY MEDICINE

## 2020-06-25 PROCEDURE — 85025 COMPLETE CBC W/AUTO DIFF WBC: CPT | Performed by: EMERGENCY MEDICINE

## 2020-06-25 PROCEDURE — 99285 EMERGENCY DEPT VISIT HI MDM: CPT | Performed by: EMERGENCY MEDICINE

## 2020-06-25 PROCEDURE — 83036 HEMOGLOBIN GLYCOSYLATED A1C: CPT | Performed by: INTERNAL MEDICINE

## 2020-06-25 PROCEDURE — 82955 ASSAY OF G6PD ENZYME: CPT | Performed by: EMERGENCY MEDICINE

## 2020-06-25 PROCEDURE — 84145 PROCALCITONIN (PCT): CPT | Performed by: EMERGENCY MEDICINE

## 2020-06-25 PROCEDURE — 99223 1ST HOSP IP/OBS HIGH 75: CPT | Performed by: INTERNAL MEDICINE

## 2020-06-25 PROCEDURE — U0003 INFECTIOUS AGENT DETECTION BY NUCLEIC ACID (DNA OR RNA); SEVERE ACUTE RESPIRATORY SYNDROME CORONAVIRUS 2 (SARS-COV-2) (CORONAVIRUS DISEASE [COVID-19]), AMPLIFIED PROBE TECHNIQUE, MAKING USE OF HIGH THROUGHPUT TECHNOLOGIES AS DESCRIBED BY CMS-2020-01-R: HCPCS | Performed by: EMERGENCY MEDICINE

## 2020-06-25 PROCEDURE — 83605 ASSAY OF LACTIC ACID: CPT | Performed by: INTERNAL MEDICINE

## 2020-06-25 PROCEDURE — 86140 C-REACTIVE PROTEIN: CPT | Performed by: EMERGENCY MEDICINE

## 2020-06-25 PROCEDURE — 82140 ASSAY OF AMMONIA: CPT | Performed by: PHYSICIAN ASSISTANT

## 2020-06-25 RX ORDER — INSULIN GLARGINE 100 [IU]/ML
22 INJECTION, SOLUTION SUBCUTANEOUS
Status: DISCONTINUED | OUTPATIENT
Start: 2020-06-25 | End: 2020-06-26

## 2020-06-25 RX ORDER — TORSEMIDE 20 MG/1
20 TABLET ORAL
Status: DISCONTINUED | OUTPATIENT
Start: 2020-06-25 | End: 2020-06-26

## 2020-06-25 RX ORDER — WARFARIN SODIUM 7.5 MG/1
7.5 TABLET ORAL
Status: DISCONTINUED | OUTPATIENT
Start: 2020-06-25 | End: 2020-06-26

## 2020-06-25 RX ORDER — BISACODYL 10 MG
10 SUPPOSITORY, RECTAL RECTAL DAILY PRN
Status: DISCONTINUED | OUTPATIENT
Start: 2020-06-25 | End: 2020-06-25

## 2020-06-25 RX ORDER — ASCORBIC ACID 500 MG
1000 TABLET ORAL EVERY 12 HOURS SCHEDULED
Status: DISPENSED | OUTPATIENT
Start: 2020-06-25 | End: 2020-07-02

## 2020-06-25 RX ORDER — ZINC SULFATE 50(220)MG
220 CAPSULE ORAL DAILY
Status: COMPLETED | OUTPATIENT
Start: 2020-06-25 | End: 2020-07-01

## 2020-06-25 RX ORDER — CLOTRIMAZOLE 1 %
CREAM (GRAM) TOPICAL EVERY 12 HOURS SCHEDULED
Status: DISCONTINUED | OUTPATIENT
Start: 2020-06-25 | End: 2020-07-14 | Stop reason: HOSPADM

## 2020-06-25 RX ORDER — ACYCLOVIR 200 MG/1
400 CAPSULE ORAL
Status: DISCONTINUED | OUTPATIENT
Start: 2020-06-25 | End: 2020-07-03

## 2020-06-25 RX ORDER — MELATONIN
2000 DAILY
Status: DISCONTINUED | OUTPATIENT
Start: 2020-06-25 | End: 2020-07-14 | Stop reason: HOSPADM

## 2020-06-25 RX ORDER — ACETAMINOPHEN 325 MG/1
650 TABLET ORAL EVERY 6 HOURS PRN
Status: DISCONTINUED | OUTPATIENT
Start: 2020-06-25 | End: 2020-07-14 | Stop reason: HOSPADM

## 2020-06-25 RX ORDER — BISACODYL 10 MG
10 SUPPOSITORY, RECTAL RECTAL DAILY PRN
Status: DISCONTINUED | OUTPATIENT
Start: 2020-06-25 | End: 2020-07-14 | Stop reason: HOSPADM

## 2020-06-25 RX ORDER — DOXYCYCLINE HYCLATE 100 MG/1
100 CAPSULE ORAL EVERY 12 HOURS
Status: DISCONTINUED | OUTPATIENT
Start: 2020-06-25 | End: 2020-07-03

## 2020-06-25 RX ORDER — ATORVASTATIN CALCIUM 40 MG/1
40 TABLET, FILM COATED ORAL
Status: DISCONTINUED | OUTPATIENT
Start: 2020-06-25 | End: 2020-07-14 | Stop reason: HOSPADM

## 2020-06-25 RX ORDER — FLUTICASONE FUROATE AND VILANTEROL 200; 25 UG/1; UG/1
1 POWDER RESPIRATORY (INHALATION) DAILY
Status: DISCONTINUED | OUTPATIENT
Start: 2020-06-26 | End: 2020-07-14 | Stop reason: HOSPADM

## 2020-06-25 RX ORDER — METHYLPREDNISOLONE SODIUM SUCCINATE 125 MG/2ML
125 INJECTION, POWDER, LYOPHILIZED, FOR SOLUTION INTRAMUSCULAR; INTRAVENOUS DAILY
Status: COMPLETED | OUTPATIENT
Start: 2020-06-26 | End: 2020-07-05

## 2020-06-25 RX ORDER — ALBUTEROL SULFATE 90 UG/1
2 AEROSOL, METERED RESPIRATORY (INHALATION) EVERY 6 HOURS PRN
Status: DISCONTINUED | OUTPATIENT
Start: 2020-06-25 | End: 2020-07-14 | Stop reason: HOSPADM

## 2020-06-25 RX ORDER — MULTIVITAMIN/IRON/FOLIC ACID 18MG-0.4MG
1 TABLET ORAL DAILY
Status: DISCONTINUED | OUTPATIENT
Start: 2020-07-02 | End: 2020-07-14 | Stop reason: HOSPADM

## 2020-06-25 RX ORDER — METHYLPREDNISOLONE SODIUM SUCCINATE 125 MG/2ML
125 INJECTION, POWDER, LYOPHILIZED, FOR SOLUTION INTRAMUSCULAR; INTRAVENOUS ONCE
Status: COMPLETED | OUTPATIENT
Start: 2020-06-25 | End: 2020-06-25

## 2020-06-25 RX ORDER — ALBUTEROL SULFATE 90 UG/1
4 AEROSOL, METERED RESPIRATORY (INHALATION) ONCE
Status: COMPLETED | OUTPATIENT
Start: 2020-06-25 | End: 2020-06-25

## 2020-06-25 RX ADMIN — INSULIN GLARGINE 22 UNITS: 100 INJECTION, SOLUTION SUBCUTANEOUS at 21:42

## 2020-06-25 RX ADMIN — CEFTRIAXONE SODIUM 1000 MG: 10 INJECTION, POWDER, FOR SOLUTION INTRAVENOUS at 13:56

## 2020-06-25 RX ADMIN — METHYLPREDNISOLONE SODIUM SUCCINATE 125 MG: 125 INJECTION, POWDER, FOR SOLUTION INTRAMUSCULAR; INTRAVENOUS at 14:27

## 2020-06-25 RX ADMIN — ATORVASTATIN CALCIUM 40 MG: 40 TABLET, FILM COATED ORAL at 17:59

## 2020-06-25 RX ADMIN — ZINC SULFATE 220 MG (50 MG) CAPSULE 220 MG: CAPSULE at 16:48

## 2020-06-25 RX ADMIN — WARFARIN SODIUM 7.5 MG: 7.5 TABLET ORAL at 17:59

## 2020-06-25 RX ADMIN — INSULIN LISPRO 4 UNITS: 100 INJECTION, SOLUTION INTRAVENOUS; SUBCUTANEOUS at 21:43

## 2020-06-25 RX ADMIN — INSULIN LISPRO 4 UNITS: 100 INJECTION, SOLUTION INTRAVENOUS; SUBCUTANEOUS at 18:00

## 2020-06-25 RX ADMIN — DOXYCYCLINE 100 MG: 100 CAPSULE ORAL at 23:17

## 2020-06-25 RX ADMIN — MELATONIN 2000 UNITS: at 16:48

## 2020-06-25 RX ADMIN — ALBUTEROL SULFATE 4 PUFF: 90 AEROSOL, METERED RESPIRATORY (INHALATION) at 13:55

## 2020-06-25 RX ADMIN — DOXYCYCLINE 100 MG: 100 INJECTION, POWDER, LYOPHILIZED, FOR SOLUTION INTRAVENOUS at 14:24

## 2020-06-25 RX ADMIN — TORSEMIDE 20 MG: 20 TABLET ORAL at 16:48

## 2020-06-25 RX ADMIN — ACYCLOVIR 400 MG: 200 CAPSULE ORAL at 17:59

## 2020-06-25 NOTE — ASSESSMENT & PLAN NOTE
Pt presents with 1 week symptoms at nursing home  With elevated CRP, procalcitonin  D-dimer not done will check  Will start the patient on moderate pathway with steroids  Continue ceftriaxone and doxycycline given elevated procalcitonin  Trend procalcitonin  Continue supplemental oxygen to keep O2 saturation greater than 90%  Wean off as tolerated  Patient immunocompromised as he is on chemo for multiple myeloma

## 2020-06-25 NOTE — SEPSIS NOTE
Sepsis Note   Delroy Rojas 81 y o  male MRN: [id number]  Unit/Bed#: ED 09 Encounter: [id number]      qSOFA     Row Name 06/25/20 1300 06/25/20 1215 06/25/20 1146          Altered mental status GCS < 15  --  --  --      Respiratory Rate > / =22  1  1  1      Systolic BP < / =358  0  0  0      Q Sofa Score  1  1  1          Initial Sepsis Screening     Row Name 06/25/20 1302                Is the patient's history suggestive of a new or worsening infection? (!) Yes (Proceed)  -GR        Suspected source of infection  --        Are two or more of the following signs & symptoms of infection both present and new to the patient? (!) Yes (Proceed)  -GR        Indicate SIRS criteria  Tachycardia > 90 bpm;Tachypnea > 20 resp per min  -GR        If the answer is yes to both questions, suspicion of sepsis is present  --        If severe sepsis is present AND tissue hypoperfusion perists in the hour after fluid resuscitation or lactate > 4, the patient meets criteria for SEPTIC SHOCK  --        Are any of the following organ dysfunction criteria present within 6 hours of suspected infection and SIRS criteria that are NOT considered to be chronic conditions? (!) Yes  -GR        Organ dysfunction  --        Date of presentation of severe sepsis  06/25/20  -GR        Time of presentation of severe sepsis  1303  -GR        Tissue hypoperfusion persists in the hour after crystalloid fluid administration, evidenced, by either:  --        Was hypotension present within one hour of the conclusion of crystalloid fluid administration?   No  -GR        Date of presentation of septic shock  --        Time of presentation of septic shock  --          User Key  (r) = Recorded By, (t) = Taken By, (c) = Cosigned By    [address] Name Provider Type    Silvia Gamble MD Physician               Default Flowsheet Data (last 720 hours)      Sepsis Reassess     Row Name 06/25/20 1346                   Repeat Volume Status and Tissue Perfusion Assessment Performed    Repeat Volume Status and Tissue Perfusion Assessment Performed  Yes  -GR           Volume Status and Tissue Perfusion Post Fluid Resuscitation * Must Document All *    Vital Signs Reviewed (HR, RR, BP, T)  Yes  -GR        Shock Index Reviewed  --        Arterial Oxygen Saturation Reviewed (POx, SaO2 or SpO2)  --        Cardio  Normal S1/S2  -GR        Pulmonary  Normal effort  -GR        Capillary Refill  Brisk  -GR        Peripheral Pulses  --        Skin  Warm  -GR        Urine output assessed  --           *OR*   Intensive Monitoring- Must Document One of the Following Four *:    Vital Signs Reviewed  --        * Central Venous Pressure (CVP or RAP)  --        * Central Venous Oxygen (SVO2, ScvO2 or Oxygen saturation via central catheter)  --        * Bedside Cardiovascular US in IVC diameter and % collapse  --        * Passive Leg Raise OR Crystalloid Challenge  --          User Key  (r) = Recorded By, (t) = Taken By, (c) = Cosigned By    [address] Name Provider Type    Jose Daniel Menezes MD Physician

## 2020-06-25 NOTE — QUICK NOTE
Upon admission, looking through patients belongings pt stated that he thought he had his phone and wallet with him, marked under belongings list; when looking through pt bag no wallet or cellphone was found, a comment was added to the belongings list and the ED was called they stated they would look into it  However when continuing to talk to the pt he said they might be at home on his nightstand at [facility]   Awaiting to here from ED; belonging bag still in pt room at bedside

## 2020-06-25 NOTE — H&P
H&P- [place] Seen 1950, 81 y o  male MRN: [id number]    Unit/Bed#: ED 09 Encounter: [id number]    Primary Care Provider: Maral Boogie MD   Date and time admitted to hospital: 6/25/2020 11:43 AM        * Acute respiratory failure with hypoxia due to Pneumonia due to COVID-19 virus  Assessment & Plan  Pt presents with 1 week symptoms at nursing home  With elevated CRP, procalcitonin  D-dimer not done will check  Will start the patient on moderate pathway with steroids  Continue ceftriaxone and doxycycline given elevated procalcitonin  Trend procalcitonin  Continue supplemental oxygen to keep O2 saturation greater than 90%  Wean off as tolerated  Patient immunocompromised as he is on chemo for multiple myeloma  Sepsis due to COVID-19 pneumonia [facility])  Assessment & Plan  Patient presenting with tachycardia, tachypnea, hypoxia and confirm COVID-19 pneumonia  Elevated procalcitonin  Continue antibiotics as above  Follow-up blood cultures  CKD (chronic kidney disease)  Assessment & Plan  Baseline creatinine around 1 2  Creatinine currently very slightly elevated 1 4  Continue monitor for now  Chronic obstructive pulmonary disease, unspecified ([facility] [address] )  Assessment & Plan  Not in exacerbation  Continue home inhalers  Elevated troponin  Assessment & Plan  Suspect non MI troponin elevation the setting of acute respiratory failure from COVID-19 pneumonia  Patient denies any chest pain  Trend for now  Chronic diastolic congestive heart failure [facility])  Assessment & Plan  Wt Readings from Last 3 Encounters:   06/15/20 (!) 155 kg (341 lb 4 4 oz)   06/08/20 (!) 154 kg (340 lb 9 8 oz)   06/01/20 (!) 156 kg (343 lb 0 6 oz)     Without exacerbation  BNP elevated compared to baseline  Continue monitor closely for now  Continue home dose of torsemide for now  If patient with worsening respiratory distress or increasing O2 requirements, can consider IV diuretics  Daily weights      Chronic atrial fibrillation [facility])  Assessment & Plan  Rate controlled  Cont lopressor  Coumadin for [facility]    Essential hypertension  Assessment & Plan  Well controlled  Continue metoprolol and torsemide  Diabetes mellitus type 2, uncontrolled ([address] )  Assessment & Plan  Lab Results   Component Value Date    HGBA1C 6 1 12/05/2019       No results for input(s): POCGLU in the last 72 hours  Blood Sugar Average: Last 72 hrs:     Continue home dose of Lantus with sliding scale  VTE Prophylaxis: Warfarin (Coumadin)  / sequential compression device   Code Status: full  POLST: POLST form is not discussed and not completed at this time  Discussion with family: pt    Anticipated Length of Stay:  Patient will be admitted on an Inpatient basis with an anticipated length of stay of  > 2 midnights  Justification for Hospital Stay: above    Total Time for Visit, including Counseling / Coordination of Care: 45 minutes  Greater than 50% of this total time spent on direct patient counseling and coordination of care  Chief Complaint:   Cough SOB x 1 week    History of Present Illness:    Kirstin Chacko is a 81 y o  male who presents from nursing home with productive cough and shortness of breath that has been progressive worsening over approximately 1 week now  He also had few episodes of loose bowel movements  Not sure if he had fever  Doristine Embs he has not been feeling very for last few days now  Was never checked for COVID-19  He has multiple chronic medical problems including AFib, CHF, CKD, diabetes mellitus, hypertension, multiple myeloma - undergoing treatment  Was noted to be hypoxic upon arrival to ER  Currently feeling better supplemental oxygen  Review of Systems:    Review of Systems   Constitutional: Positive for fatigue  Negative for chills and fever  HENT: Negative for congestion and sore throat  Respiratory: Positive for cough and shortness of breath      Cardiovascular: Negative for chest pain, palpitations and leg swelling  Gastrointestinal: Positive for diarrhea  Negative for abdominal pain, nausea and vomiting  Genitourinary: Negative for difficulty urinating, dysuria, flank pain, frequency and urgency  Musculoskeletal: Negative for arthralgias and myalgias  Skin: Negative for color change and rash  Neurological: Negative for dizziness and light-headedness  Psychiatric/Behavioral: Negative for agitation, behavioral problems and confusion  Past Medical and Surgical History:     Past Medical History:   Diagnosis Date    Cancer [facility])     [facility]congestive [facility] failure) [facility]     Chronic kidney disease     COPD (chronic obstructive pulmonary disease) ([facility])     Decubitus ulcer of heel     LAST ASSESSED [id number]    Diabetes mellitus ([facility])     History of varicose veins     Hypertension     Intermittent claudication ([facility])     Neuropathy     Seasonal allergies        Past Surgical History:   Procedure Laterality Date    AMPUTATION ABOVE KNEE (AKA) Left 7/31/2019    Procedure: AMPUTATION ABOVE KNEE (AKA); Surgeon: Ryan Torres MD;  [facility];  Service: General    ANGIOPLASTY      W/ FLUOROSC ANGIOGRAPGY PERIPHERAL ARTERY ADDITIONAL  LAST ASSESSED [id number]    ANGIOPLASTY / STENTING FEMORAL      TANSCATH INTRAVASCULAR STENT PLACEMENT PERCUTANEOUS FEMORAL     COLONOSCOPY  2010    CT BONE MARROW BIOPSY AND ASPIRATION  8/9/2019    FULL THICKNESS SKIN GRAFT      TENDON REPAIR      TOE AMPUTATION Left 12/27/2018    Procedure: AMPUTATION left 4th TOE;  Surgeon: Neyda Ulloa DPM;  [facility];  Service: Podiatry       Meds/Allergies:    Prior to Admission medications    Medication Sig Start Date End Date Taking?  Authorizing Provider   acetaminophen (TYLENOL) 500 mg tablet Take 1 tablet (500 mg total) by mouth every 6 (six) hours as needed for mild pain, headaches or fever 10/21/19   Rick Ordoñez MD   acyclovir (ZOVIRAX) 400 MG tablet TAKE ONE TABLET BY MOUTH EVERY DAY 2/12/20 4/30/20  Edvin Garcia MD   acyclovir (ZOVIRAX) 400 MG tablet  4/15/20   Historical Provider, MD   albuterol (PROVENTIL HFA,VENTOLIN HFA) 90 mcg/act inhaler Inhale 2 puffs every 6 (six) hours as needed for wheezing  Patient not taking: Reported on 6/9/2020 4/26/19   Edvin Garcia MD   Alcohol Swabs (ALCOHOL PREP) 70 % PADS  9/18/19   Historical Provider, MD   allopurinol (ZYLOPRIM) 300 mg tablet TAKE ONE TABLET BY MOUTH DAILY  Patient not taking: Reported on 6/15/2020 4/11/20   Edvin Garcia MD   ammonium lactate (LAC-HYDRIN) 12 % lotion APPLY TO BLE TOPICALLY DAILY  Patient not taking: Reported on 6/15/2020 5/14/20   Edvin Garcia MD   atorvastatin (LIPITOR) 20 mg tablet TAKE ONE TABLET BY MOUTH DAILY 5/8/20   Edvin Garcia MD   BD AUTOSHIELD DUO 30G X 5 MM MISC USE AS DIRECTED WITH INSULIN FOUR TIMES A DAY 5/9/20   Edvin Garcia MD   BD INSULIN SYRINGE U/F 31G X 5/16" 0 5 ML MISC USE AS DIRECTED WITH NOVOLIN 70/30 12/29/18   Historical Provider, MD   BD PEN NEEDLE HILARIO U/F 32G X 4 MM MISC by Other route 3 (three) times a day 6/28/19   Edvin Garcia MD   bisacodyl (bisacodyl) 5 mg EC tablet Take 5 mg by mouth daily as needed for constipation    Historical Provider, MD   bisacodyl (DULCOLAX) 10 mg suppository Insert 10 mg into the rectum as needed for constipation    Historical Provider, MD   bortezomib (VELCADE) Infuse into a venous catheter once    Historical Provider, MD   clotrimazole (LOTRIMIN) 1 % cream APPLY TO BUTTOCK 2 TIMES DAILY 1/22/20   Edvin Garcia MD   Easy Touch Safety Lancets 28G MISC USE 4 TIMES DAILY TO TEST BLOOD SUGAR 3/8/20   NICOLA Mcclure   fluticasone-salmeterol (ADVAIR DISKUS, WIXELA INHUB) 250-50 mcg/dose inhaler Inhale 1 puff 2 (two) times a day Rinse mouth after use  7/24/19   Edvin Garcia MD   insulin glargine (LANTUS SOLOSTAR) 100 units/mL injection pen Inject 22 Units under the skin daily 1/22/20   Edvin Garcia MD   insulin lispro (HumaLOG) 100 units/mL injection Inject 5 Units under the skin 3 (three) times a day with meals  Patient not taking: Reported on 6/9/2020 12/11/19   Sam Mondragon PA-C   Insulin Pen Needle 31G X 5 MM MISC by Does not apply route 2 (two) times a day for 50 days 12/28/18 1/22/20  Wang Eldridge PA-C   magnesium hydroxide (MILK OF MAGNESIA) 400 mg/5 mL oral suspension Take 30 mL by mouth daily as needed for constipation    Historical Provider, MD   Melatonin 5 MG TABS TAKE ONE TABLET BY MOUTH EVERY NIGHT AT BEDTIME  Patient not taking: Reported on 6/9/2020 4/16/20   Jenny Shaw MD   metFORMIN (GLUCOPHAGE) 1000 MG tablet TAKE ONE TABLET BY MOUTH TWO TIMES A DAY 4/16/20   Jenny Shaw MD   metoprolol tartrate (LOPRESSOR) 25 mg tablet TAKE ONE TABLET BY MOUTH EVERY 12 HOURS 4/11/20   Jenny Shaw MD   NOVOLOG FLEXPEN 100 units/mL SOPN INJ 5U BEFORE MEALS AND PER SS <250=NO CALL 250-300=5U,301-350= 10U,351-400=15U,401-450=20U>451 CALL FOR ORDERS UP TO 4X PER DAY 4/13/20   Jenny Shaw MD   torsemide (DEMADEX) 10 mg tablet TAKE 2 TABLETS BY MOUTH TWO TIMES A DAY 5/15/20   Jenny Shaw MD   triamcinolone (KENALOG) 0 1 % cream APPLY TO THE FACE TWO TIMES A DAY 6/2/20   Jenny Shaw MD   warfarin (COUMADIN) 7 5 mg tablet 7 5 mg on August 12th and 13th then INR on August 14th  Patient taking differently: Take 5 mg by mouth daily Takes 1 5 tabs at HS every Sunday, Tuesday, Thursday  Takes 2 tabs HS every Monday, Wednesday, Friday and Saturday 8/12/19   Katarina Galaviz MD       Allergies:    Allergies   Allergen Reactions    Latex Rash       Social History:     Marital Status:    Occupation:   Patient Pre-hospital Living Situation:   Patient Pre-hospital Level of Mobility:   Patient Pre-hospital Diet Restrictions:   Substance Use History:   Social History     Substance and Sexual Activity   Alcohol Use Not Currently     Social History     Tobacco Use   Smoking Status Never Smoker   Smokeless Tobacco Never Used     Social History Substance and Sexual Activity   Drug Use Not Currently       Family History:    Family History   Problem Relation Age of Onset    Other Mother         CARDIAC DISORDER     Diabetes Mother     Cancer Father     Other Family         CARDIAC DISORDER     Diabetes Family     Cancer Family     Mental illness Family     Kidney disease Sister     Diabetes Sister        Physical Exam:     Vitals:   Blood Pressure: 134/74 (06/25/20 1400)  Pulse: 96 (06/25/20 1400)  Temperature: 98 °F (36 7 °C) (06/25/20 1146)  Respirations: (!) 24 (06/25/20 1400)  SpO2: 94 % (06/25/20 1400)    Physical Exam    Constitutional: Pt appears comfortable  Not in any acute distress  HENT:   Head: Normocephalic and atraumatic  Eyes: EOM are normal    Neck: Neck supple  Cardiovascular: Normal rate, regular rhythm, normal heart sounds  No murmur heard  Pulmonary/Chest: Effort normal, air entry b/l equal  No respiratory distress  Pt has mild b/l wheezing and rales  Abdominal: Soft  Non-distended, Non-tender  Bowel sounds are normal    Neurological: awake, alert  Psychiatric: normal mood and affect  Additional Data:     Lab Results: I have personally reviewed pertinent reports        Results from last 7 days   Lab Units 06/25/20  1207   WBC Thousand/uL 7 71   HEMOGLOBIN g/dL 11 7*   HEMATOCRIT % 37 6   PLATELETS Thousands/uL 158   NEUTROS PCT % 81*   LYMPHS PCT % 10*   MONOS PCT % 8   EOS PCT % 0     Results from last 7 days   Lab Units 06/25/20  1207   SODIUM mmol/L 135*   POTASSIUM mmol/L 3 9   CHLORIDE mmol/L 101   CO2 mmol/L 22   BUN mg/dL 33*   CREATININE mg/dL 1 47*   ANION GAP mmol/L 12   CALCIUM mg/dL 8 0*   ALBUMIN g/dL 3 0*   TOTAL BILIRUBIN mg/dL 1 35*   ALK PHOS U/L 163*   ALT U/L 27   AST U/L 30   GLUCOSE RANDOM mg/dL 192*     Results from last 7 days   Lab Units 06/25/20  1207   INR  1 63*             Results from last 7 days   Lab Units 06/25/20  1207   LACTIC ACID mmol/L 2 1*   PROCALCITONIN ng/ml 1 42* Imaging: I have personally reviewed pertinent reports  XR chest 1 view portable   Final Result by Brendon Cunningham MD (06/25 1327)      Bilateral consolidations concerning for pneumonia In the setting of clinically suspected/proven COVID-19, this plain film appearance while nonspecific, can be seen in cases of viral pneumonia such as COVID-19  The study was marked in [facility] for immediate notification  Workstation performed: [id number]             EKG, Pathology, and Other Studies Reviewed on Admission:   · EKG: my read - afib rate controlled, PVC, no acute ST T changes    Allscripts / Epic Records Reviewed: Yes     ** Please Note: This note has been constructed using a voice recognition system   **

## 2020-06-25 NOTE — ASSESSMENT & PLAN NOTE
Suspect non MI troponin elevation the setting of acute respiratory failure from COVID-19 pneumonia  Patient denies any chest pain  Trend for now

## 2020-06-25 NOTE — ASSESSMENT & PLAN NOTE
Lab Results   Component Value Date    HGBA1C 6 1 12/05/2019       No results for input(s): POCGLU in the last 72 hours  Blood Sugar Average: Last 72 hrs:     Continue home dose of Lantus with sliding scale

## 2020-06-25 NOTE — PLAN OF CARE
Problem: PAIN - ADULT  Goal: Verbalizes/displays adequate comfort level or baseline comfort level  Description  Interventions:  - Encourage patient to monitor pain and request assistance  - Assess pain using appropriate pain scale  - Administer analgesics based on type and severity of pain and evaluate response  - Implement non-pharmacological measures as appropriate and evaluate response  - Consider cultural and social influences on pain and pain management  - Notify physician/advanced practitioner if interventions unsuccessful or patient reports new pain  Outcome: Progressing     Problem: INFECTION - ADULT  Goal: Absence or prevention of progression during hospitalization  Description  INTERVENTIONS:  - Assess and monitor for signs and symptoms of infection  - Monitor lab/diagnostic results  - Monitor all insertion sites, i e  indwelling lines, tubes, and drains  - Monitor endotracheal if appropriate and nasal secretions for changes in amount and color  - [place] appropriate cooling/warming therapies per order  - Administer medications as ordered  - Instruct and encourage patient and family to use good hand hygiene technique  - Identify and instruct in appropriate isolation precautions for identified infection/condition  Outcome: Progressing  Goal: Absence of fever/infection during neutropenic period  Description  INTERVENTIONS:  - Monitor WBC    Outcome: Progressing     Problem: SAFETY ADULT  Goal: Patient will remain free of falls  Description  INTERVENTIONS:  - Assess patient frequently for physical needs  -  Identify cognitive and physical deficits and behaviors that affect risk of falls    -  [place] fall precautions as indicated by assessment   - Educate patient/family on patient safety including physical limitations  - Instruct patient to call for assistance with activity based on assessment  - Modify environment to reduce risk of injury  - Consider OT/PT consult to assist with strengthening/mobility  Outcome: Progressing  Goal: Maintain or return to baseline ADL function  Description  INTERVENTIONS:  -  Assess patient's ability to carry out ADLs; assess patient's baseline for ADL function and identify physical deficits which impact ability to perform ADLs (bathing, care of mouth/teeth, toileting, grooming, dressing, etc )  - Assess/evaluate cause of self-care deficits   - Assess range of motion  - Assess patient's mobility; develop plan if impaired  - Assess patient's need for assistive devices and provide as appropriate  - Encourage maximum independence but intervene and supervise when necessary  - Involve family in performance of ADLs  - Assess for home care needs following discharge   - Consider OT consult to assist with ADL evaluation and planning for discharge  - Provide patient education as appropriate  Outcome: Progressing  Goal: Maintain or return mobility status to optimal level  Description  INTERVENTIONS:  - Assess patient's baseline mobility status (ambulation, transfers, stairs, etc )    - Identify cognitive and physical deficits and behaviors that affect mobility  - Identify mobility aids required to assist with transfers and/or ambulation (gait belt, sit-to-stand, lift, walker, cane, etc )  - [place] fall precautions as indicated by assessment  - Record patient progress and toleration of activity level on Mobility SBAR; progress patient to next Phase/Stage  - Instruct patient to call for assistance with activity based on assessment  - Consider rehabilitation consult to assist with strengthening/weightbearing, etc   Outcome: Progressing

## 2020-06-25 NOTE — SEPSIS NOTE
Sepsis Note   Brenda Graves 81 y o  male MRN: [id number]  Unit/Bed#: ED 09 Encounter: [id number]      qSOFA     Row Name 06/25/20 1215 06/25/20 1146             Altered mental status GCS < 15  --  --       Respiratory Rate > / =22  1  1       Systolic BP < / =779  0  0       Q Sofa Score  1  1           Initial Sepsis Screening     Row Name 06/25/20 1302                Is the patient's history suggestive of a new or worsening infection? (!) Yes (Proceed)  -GR        Suspected source of infection  --        Are two or more of the following signs & symptoms of infection both present and new to the patient? (!) Yes (Proceed)  -GR        Indicate SIRS criteria  Tachycardia > 90 bpm;Tachypnea > 20 resp per min  -GR        If the answer is yes to both questions, suspicion of sepsis is present  --        If severe sepsis is present AND tissue hypoperfusion perists in the hour after fluid resuscitation or lactate > 4, the patient meets criteria for SEPTIC SHOCK  --        Are any of the following organ dysfunction criteria present within 6 hours of suspected infection and SIRS criteria that are NOT considered to be chronic conditions? (!) Yes  -GR        Organ dysfunction  --        Date of presentation of severe sepsis  06/25/20  -GR        Time of presentation of severe sepsis  1303  -GR        Tissue hypoperfusion persists in the hour after crystalloid fluid administration, evidenced, by either:  --        Was hypotension present within one hour of the conclusion of crystalloid fluid administration?   No  -GR        Date of presentation of septic shock  --        Time of presentation of septic shock  --          User Key  (r) = Recorded By, (t) = Taken By, (c) = Cosigned By    [address] Name Provider Dez Ribeiro MD Physician

## 2020-06-25 NOTE — ASSESSMENT & PLAN NOTE
Wt Readings from Last 3 Encounters:   06/15/20 (!) 155 kg (341 lb 4 4 oz)   06/08/20 (!) 154 kg (340 lb 9 8 oz)   06/01/20 (!) 156 kg (343 lb 0 6 oz)     Without exacerbation  BNP elevated compared to baseline  Continue monitor closely for now  Continue home dose of torsemide for now  If patient with worsening respiratory distress or increasing O2 requirements, can consider IV diuretics  Daily weights

## 2020-06-25 NOTE — ASSESSMENT & PLAN NOTE
Baseline creatinine around 1 2  Creatinine currently very slightly elevated 1 4  Continue monitor for now

## 2020-06-25 NOTE — ED PROVIDER NOTES
Emergency Department Note- Radha Barcenas 81 y o  male MRN: [id number]    Unit/Bed#: ED 09 Encounter: [id number]        History of Present Illness   HPI:  Radha Barcenas is a 81 y o  male who presents with weakness shortness of breath and cough from nursing home  Patient states he has been feeling weak coughing short of breath for the last few days  Patient also noted diarrhea a few days ago  Does not know if he has been having fevers  Patient denies chest pain no headache no nausea vomiting no abdominal pain  Patient has a history of CHF CKD COPD diabetes hypertension and has had left leg amputation  Patient is at a nursing home  Patient noted to be with oxygen saturations in the 80s which was improved with 2 L nasal cannula oxygen up into the mid 90s and the patient feels better  REVIEW OF SYSTEMS    Constitutional:  Positive for chills, fatigue and negative fever  HENT: Negative for ear pain, sore throat and trouble swallowing  Eyes: Negative for photophobia, pain and visual disturbance  Respiratory:  Positive for cough, chest tightness and shortness of breath  Cardiovascular: Negative for chest pain and palpitations  Gastrointestinal: Negative for abdominal pain, constipation, positive diarrhea, negative nausea and vomiting  Genitourinary: Negative for dysuria, flank pain, frequency and hematuria  Musculoskeletal: Negative for back pain and neck pain  Skin: Negative for color change and rash  Neurological: Negative for dizziness, positive weakness, light-headedness and negative headaches  Psychiatric/Behavioral: Negative for confusion  The patient is not nervous/anxious      All systems reviewed and negative except as noted above or in HPI         Historical Information   Past Medical History:   Diagnosis Date    Cancer [facility])     CHF (congestive heart failure) (HCC)     Chronic kidney disease     COPD (chronic obstructive pulmonary disease) (HCC)     Decubitus ulcer of heel     LAST ASSESSED [id number]    Diabetes mellitus ([facility] [address] )     History of varicose veins     Hypertension     Intermittent claudication (HCC)     Neuropathy     Seasonal allergies      Past Surgical History:   Procedure Laterality Date    AMPUTATION ABOVE KNEE (AKA) Left 7/31/2019    Procedure: AMPUTATION ABOVE KNEE (AKA); Surgeon: Katie Riley MD;  [facility];  Service: General    ANGIOPLASTY      W/ FLUOROSC ANGIOGRAPGY PERIPHERAL ARTERY ADDITIONAL  LAST ASSESSED [id number]    ANGIOPLASTY / STENTING FEMORAL      TANSCATH INTRAVASCULAR STENT PLACEMENT PERCUTANEOUS FEMORAL     COLONOSCOPY  2010    CT BONE MARROW BIOPSY AND ASPIRATION  8/9/2019    FULL THICKNESS SKIN GRAFT      TENDON REPAIR      TOE AMPUTATION Left 12/27/2018    Procedure: AMPUTATION left 4th TOE;  Surgeon: Carlos Lara DPM;  [facility];  Service: Podiatry     Social History   Social History     Substance and Sexual Activity   Alcohol Use Not Currently     Social History     Substance and Sexual Activity   Drug Use Not Currently     Social History     Tobacco Use   Smoking Status Never Smoker   Smokeless Tobacco Never Used     Family History:   Family History   Problem Relation Age of Onset    Other Mother         CARDIAC DISORDER     Diabetes Mother     Cancer Father     Other Family         CARDIAC DISORDER     Diabetes Family     Cancer Family     Mental illness Family     Kidney disease Sister     Diabetes Sister        Meds/Allergies     (Not in a hospital admission)  Allergies   Allergen Reactions    Latex Rash       Objective   Vitals: Blood pressure 168/82, pulse 104, temperature 98 °F (36 7 °C), resp  rate (!) 24, SpO2 (!) 87 %      PHYSICAL EXAM     General Appearance: alert and oriented, nad, non toxic appearing  Skin:  Warm, dry, intact  HEENT: atraumatic, normocephalic, eomi, perll   Neck: Supple, no JVD, no lymphadenopathy, trachea midline, no bruit  Cardiac: rrr, no murmurs, rub, gallops  Pulmonary: lungs cta, mild wheezes bilaterally, rales, rhonchi  Gastrointestinal: abdomen soft nontender, good bs, no mass or bruits, no cva tenderness  Extremities:, no cyanosis left AKA right with pedal edema and chronic venous changes with erythema no tenderness  Neuro:  no focal motor or sensory deficits, cn intact  Psych:  Normal mood and affect, normal judgement and insight      Lab Results: Lab Results: I have personally reviewed pertinent lab results  Imaging: I have personally reviewed pertinent reports  EKG, Pathology, and Other Studies: I have personally reviewed pertinent films in PACS    Assessment/Plan     ED Medical Decision Making:  I suspicion for COVID pneumonia for this patient  Patient is requiring oxygen will send off septic workup in COVID labs  Patient requiring supplemental oxygen at this time and will likely require admission to the hospital    ECG 12 Lead Documentation  Date/Time: today/date: 6/25/2020  Performed by: Quinten Glover  Authorized by: Quinten Glover     ECG reviewed by me, the ED Provider: yes    Patient location:  ED   Previous ECG:  Compared to current, no change   Rate:  ECG rate assessment: normal    Rhythm: sinus rhythm    Ectopy:  : none    QRS axis:  Normal  Intervals: normal   Q waves: None   ST segments:  Normal  T waves: normal      Impression:  Sinus tachycardia at hr 105      Portions of the record may have been created with voice recognition software  Occasional wrong word or "sound a like" substitutions may have occurred due to the inherent limitations of voice recognition software  Read the chart carefully and recognize, using context, where substitutions have occurred         Sweetie Mclaughlin MD  06/25/20 [id number]

## 2020-06-25 NOTE — ASSESSMENT & PLAN NOTE
Patient presenting with tachycardia, tachypnea, hypoxia and confirm COVID-19 pneumonia  Elevated procalcitonin  Continue antibiotics as above  Follow-up blood cultures

## 2020-06-26 ENCOUNTER — TELEPHONE (OUTPATIENT)
Dept: SURGICAL ONCOLOGY | Facility: CLINIC | Age: 70
End: 2020-06-26

## 2020-06-26 LAB
ALBUMIN SERPL BCP-MCNC: 2.8 G/DL (ref 3.5–5)
ALP SERPL-CCNC: 150 U/L (ref 46–116)
ALT SERPL W P-5'-P-CCNC: 28 U/L (ref 12–78)
ANION GAP SERPL CALCULATED.3IONS-SCNC: 9 MMOL/L (ref 4–13)
AST SERPL W P-5'-P-CCNC: 61 U/L (ref 5–45)
BASE EX.OXY STD BLDV CALC-SCNC: 95.9 % (ref 60–80)
BASE EXCESS BLDV CALC-SCNC: -4.5 MMOL/L
BASOPHILS # BLD AUTO: 0.01 THOUSANDS/ΜL (ref 0–0.1)
BASOPHILS NFR BLD AUTO: 0 % (ref 0–1)
BILIRUB SERPL-MCNC: 0.76 MG/DL (ref 0.2–1)
BUN SERPL-MCNC: 51 MG/DL (ref 5–25)
CALCIUM SERPL-MCNC: 9 MG/DL (ref 8.3–10.1)
CHLORIDE SERPL-SCNC: 102 MMOL/L (ref 100–108)
CO2 SERPL-SCNC: 25 MMOL/L (ref 21–32)
CREAT SERPL-MCNC: 1.86 MG/DL (ref 0.6–1.3)
D DIMER PPP FEU-MCNC: 0.43 UG/ML FEU
EOSINOPHIL # BLD AUTO: 0 THOUSAND/ΜL (ref 0–0.61)
EOSINOPHIL NFR BLD AUTO: 0 % (ref 0–6)
ERYTHROCYTE [DISTWIDTH] IN BLOOD BY AUTOMATED COUNT: 15.7 % (ref 11.6–15.1)
G6PD BLD QN: 334 U/10E12 RBC (ref 146–376)
GFR SERPL CREATININE-BSD FRML MDRD: 37 ML/MIN/1.73SQ M
GLUCOSE SERPL-MCNC: 332 MG/DL (ref 65–140)
GLUCOSE SERPL-MCNC: 333 MG/DL (ref 65–140)
GLUCOSE SERPL-MCNC: 371 MG/DL (ref 65–140)
GLUCOSE SERPL-MCNC: 371 MG/DL (ref 65–140)
GLUCOSE SERPL-MCNC: 384 MG/DL (ref 65–140)
GLUCOSE SERPL-MCNC: 448 MG/DL (ref 65–140)
GLUCOSE SERPL-MCNC: 496 MG/DL (ref 65–140)
HCO3 BLDV-SCNC: 22.9 MMOL/L (ref 24–30)
HCT VFR BLD AUTO: 36.8 % (ref 36.5–49.3)
HGB BLD-MCNC: 11.4 G/DL (ref 12–17)
IMM GRANULOCYTES # BLD AUTO: 0.06 THOUSAND/UL (ref 0–0.2)
IMM GRANULOCYTES NFR BLD AUTO: 1 % (ref 0–2)
INR PPP: 1.61 (ref 0.84–1.19)
LYMPHOCYTES # BLD AUTO: 0.51 THOUSANDS/ΜL (ref 0.6–4.47)
LYMPHOCYTES NFR BLD AUTO: 7 % (ref 14–44)
MCH RBC QN AUTO: 28.1 PG (ref 26.8–34.3)
MCHC RBC AUTO-ENTMCNC: 31 G/DL (ref 31.4–37.4)
MCV RBC AUTO: 91 FL (ref 82–98)
MONOCYTES # BLD AUTO: 0.22 THOUSAND/ΜL (ref 0.17–1.22)
MONOCYTES NFR BLD AUTO: 3 % (ref 4–12)
NEUTROPHILS # BLD AUTO: 6.32 THOUSANDS/ΜL (ref 1.85–7.62)
NEUTS SEG NFR BLD AUTO: 89 % (ref 43–75)
NRBC BLD AUTO-RTO: 0 /100 WBCS
O2 CT BLDV-SCNC: 16.8 ML/DL
PCO2 BLDV: 52.2 MM HG (ref 42–50)
PH BLDV: 7.26 [PH] (ref 7.3–7.4)
PLATELET # BLD AUTO: 164 THOUSANDS/UL (ref 149–390)
PMV BLD AUTO: 11.2 FL (ref 8.9–12.7)
PO2 BLDV: 132.6 MM HG (ref 35–45)
POTASSIUM SERPL-SCNC: 5.1 MMOL/L (ref 3.5–5.3)
PROCALCITONIN SERPL-MCNC: 3.59 NG/ML
PROCALCITONIN SERPL-MCNC: 4.42 NG/ML
PROT SERPL-MCNC: 6.6 G/DL (ref 6.4–8.2)
PROTHROMBIN TIME: 19.1 SECONDS (ref 11.6–14.5)
RBC # BLD AUTO: 3.44 X10E6/UL (ref 4.14–5.8)
RBC # BLD AUTO: 4.05 MILLION/UL (ref 3.88–5.62)
SODIUM SERPL-SCNC: 136 MMOL/L (ref 136–145)
TROPONIN I SERPL-MCNC: 5.69 NG/ML
TROPONIN I SERPL-MCNC: 5.77 NG/ML
WBC # BLD AUTO: 7.12 THOUSAND/UL (ref 4.31–10.16)

## 2020-06-26 PROCEDURE — 84145 PROCALCITONIN (PCT): CPT | Performed by: INTERNAL MEDICINE

## 2020-06-26 PROCEDURE — 82948 REAGENT STRIP/BLOOD GLUCOSE: CPT

## 2020-06-26 PROCEDURE — 85610 PROTHROMBIN TIME: CPT | Performed by: NURSE PRACTITIONER

## 2020-06-26 PROCEDURE — 80053 COMPREHEN METABOLIC PANEL: CPT | Performed by: INTERNAL MEDICINE

## 2020-06-26 PROCEDURE — 85379 FIBRIN DEGRADATION QUANT: CPT | Performed by: INTERNAL MEDICINE

## 2020-06-26 PROCEDURE — 99233 SBSQ HOSP IP/OBS HIGH 50: CPT | Performed by: NURSE PRACTITIONER

## 2020-06-26 PROCEDURE — 85025 COMPLETE CBC W/AUTO DIFF WBC: CPT | Performed by: INTERNAL MEDICINE

## 2020-06-26 PROCEDURE — 84484 ASSAY OF TROPONIN QUANT: CPT | Performed by: NURSE PRACTITIONER

## 2020-06-26 RX ORDER — TORSEMIDE 20 MG/1
20 TABLET ORAL
Status: DISCONTINUED | OUTPATIENT
Start: 2020-06-27 | End: 2020-06-29

## 2020-06-26 RX ORDER — WARFARIN SODIUM 5 MG/1
10 TABLET ORAL
Status: COMPLETED | OUTPATIENT
Start: 2020-06-26 | End: 2020-06-26

## 2020-06-26 RX ORDER — WARFARIN SODIUM 7.5 MG/1
7.5 TABLET ORAL
Status: DISCONTINUED | OUTPATIENT
Start: 2020-06-27 | End: 2020-06-29

## 2020-06-26 RX ORDER — INSULIN GLARGINE 100 [IU]/ML
26 INJECTION, SOLUTION SUBCUTANEOUS
Status: DISCONTINUED | OUTPATIENT
Start: 2020-06-26 | End: 2020-06-27

## 2020-06-26 RX ORDER — MICONAZOLE NITRATE 20 MG/G
1 CREAM TOPICAL 3 TIMES DAILY
Status: DISCONTINUED | OUTPATIENT
Start: 2020-06-26 | End: 2020-07-14 | Stop reason: HOSPADM

## 2020-06-26 RX ADMIN — INSULIN LISPRO 10 UNITS: 100 INJECTION, SOLUTION INTRAVENOUS; SUBCUTANEOUS at 09:14

## 2020-06-26 RX ADMIN — METOPROLOL TARTRATE 25 MG: 25 TABLET, FILM COATED ORAL at 09:12

## 2020-06-26 RX ADMIN — ATORVASTATIN CALCIUM 40 MG: 40 TABLET, FILM COATED ORAL at 17:34

## 2020-06-26 RX ADMIN — OXYCODONE HYDROCHLORIDE AND ACETAMINOPHEN 1000 MG: 500 TABLET ORAL at 09:12

## 2020-06-26 RX ADMIN — ACYCLOVIR 400 MG: 200 CAPSULE ORAL at 17:33

## 2020-06-26 RX ADMIN — METOPROLOL TARTRATE 25 MG: 25 TABLET, FILM COATED ORAL at 22:25

## 2020-06-26 RX ADMIN — INSULIN LISPRO 3 UNITS: 100 INJECTION, SOLUTION INTRAVENOUS; SUBCUTANEOUS at 22:24

## 2020-06-26 RX ADMIN — CLOTRIMAZOLE: 10 CREAM TOPICAL at 22:23

## 2020-06-26 RX ADMIN — INSULIN LISPRO 8 UNITS: 100 INJECTION, SOLUTION INTRAVENOUS; SUBCUTANEOUS at 17:35

## 2020-06-26 RX ADMIN — MICONAZOLE NITRATE 1 APPLICATION: 20 CREAM TOPICAL at 17:34

## 2020-06-26 RX ADMIN — TORSEMIDE 20 MG: 20 TABLET ORAL at 09:12

## 2020-06-26 RX ADMIN — INSULIN LISPRO 12 UNITS: 100 INJECTION, SOLUTION INTRAVENOUS; SUBCUTANEOUS at 11:14

## 2020-06-26 RX ADMIN — OXYCODONE HYDROCHLORIDE AND ACETAMINOPHEN 1000 MG: 500 TABLET ORAL at 22:25

## 2020-06-26 RX ADMIN — DOXYCYCLINE 100 MG: 100 CAPSULE ORAL at 22:25

## 2020-06-26 RX ADMIN — INSULIN LISPRO 10 UNITS: 100 INJECTION, SOLUTION INTRAVENOUS; SUBCUTANEOUS at 17:34

## 2020-06-26 RX ADMIN — CEFTRIAXONE SODIUM 1000 MG: 1 INJECTION, POWDER, FOR SOLUTION INTRAMUSCULAR; INTRAVENOUS at 14:18

## 2020-06-26 RX ADMIN — INSULIN GLARGINE 26 UNITS: 100 INJECTION, SOLUTION SUBCUTANEOUS at 22:26

## 2020-06-26 RX ADMIN — FLUTICASONE FUROATE AND VILANTEROL TRIFENATATE 1 PUFF: 200; 25 POWDER RESPIRATORY (INHALATION) at 09:14

## 2020-06-26 RX ADMIN — METHYLPREDNISOLONE SODIUM SUCCINATE 125 MG: 125 INJECTION, POWDER, FOR SOLUTION INTRAMUSCULAR; INTRAVENOUS at 09:12

## 2020-06-26 RX ADMIN — CLOTRIMAZOLE: 10 CREAM TOPICAL at 09:13

## 2020-06-26 RX ADMIN — ZINC SULFATE 220 MG (50 MG) CAPSULE 220 MG: CAPSULE at 09:12

## 2020-06-26 RX ADMIN — DOXYCYCLINE 100 MG: 100 CAPSULE ORAL at 11:14

## 2020-06-26 RX ADMIN — INSULIN LISPRO 8 UNITS: 100 INJECTION, SOLUTION INTRAVENOUS; SUBCUTANEOUS at 12:40

## 2020-06-26 RX ADMIN — MICONAZOLE NITRATE 1 APPLICATION: 20 CREAM TOPICAL at 22:00

## 2020-06-26 RX ADMIN — MELATONIN 2000 UNITS: at 09:13

## 2020-06-26 RX ADMIN — WARFARIN SODIUM 10 MG: 5 TABLET ORAL at 17:34

## 2020-06-26 NOTE — QUICK NOTE
NC decreased from 4L to 2L around 1730  Desat to mid [de-identified] around 2100, NC increased to 6L then titrated back down to 4L  Patient resting comfortably with SpO2 greater than 90%, no increased WOB  VS remain stable otherwise  Also notified by RN patient appeared lethargic upon arrival to floor from ED earlier in night  Would open eyes to voice, answer questions then fall back asleep  Exam non focal  Offers no complaints  Lab work obtained and unremarkable for source of lethargy  Has not received any sedating medications  Upon reassessment patient more alert and interactive, Ox3  VSS on 4L NC  Will allow patient to rest and continue current treatment plan

## 2020-06-26 NOTE — ASSESSMENT & PLAN NOTE
· Suspect non MI troponin elevation the setting of acute respiratory failure from COVID-19 pneumonia  · Patient denies any chest pain  · Trend for now    0 99 yesterday

## 2020-06-26 NOTE — CONSULTS
Consult Note - Wound   Catie Kerr 81 y o  male MRN: [id number]  Unit/Bed#: [facility] 777-27 Encounter: [id number]      History and Present Illness:  Patient is a 81year old male admitted from skilled nursing facility with productive cough and SOB, patient tested and he is admitted with acute respiratory failure secondary to COVID 19 pneumonia  He is awake, alert and oriented, agreeable for assessment, photography and wound care  BMI: Estimated body mass index is 42 5 kg/m² as calculated from the following:    Height as of this encounter: 6' 3" (1 905 m)  Weight as of this encounter: 154 kg (340 lb)  History  Past Medical History:   Diagnosis Date    Cancer [facility])     [facility])     Chronic kidney disease     COPD (chronic obstructive pulmonary disease) ([facility])     Decubitus ulcer of heel     LAST ASSESSED [id number]    Diabetes mellitus ([facility])     History of varicose veins     Hypertension     Intermittent claudication ([facility])     Neuropathy     Seasonal allergies      Past Surgical History:   Procedure Laterality Date    AMPUTATION ABOVE KNEE (AKA) Left 7/31/2019    Procedure: AMPUTATION ABOVE KNEE (AKA);   Surgeon: Kimmy Matias MD;  [facility];  Service: General    ANGIOPLASTY      W/ FLUOROSC ANGIOGRAPGY PERIPHERAL ARTERY ADDITIONAL  LAST ASSESSED [id number]    ANGIOPLASTY / STENTING FEMORAL      TANSCATH INTRAVASCULAR STENT PLACEMENT PERCUTANEOUS FEMORAL     COLONOSCOPY  2010    CT BONE MARROW BIOPSY AND ASPIRATION  8/9/2019    FULL THICKNESS SKIN GRAFT      TENDON REPAIR      TOE AMPUTATION Left 12/27/2018    Procedure: AMPUTATION left 4th TOE;  Surgeon: Bhavana Payan DPM;  [facility];  Service: Podiatry       Problem List:   Patient Active Problem List   Diagnosis    Diabetic foot ulcer ([facility] [address] )    Diabetes mellitus type 2, uncontrolled ([facility] [address] )    Chronic venous hypertension, right    Essential hypertension    Chronic atrial fibrillation ([address] )    Morbid obesity (HCC)    Chronic diastolic congestive heart failure (HCC)    Cardiomyopathy (HCC)    Diabetes, polyneuropathy (HCC)    GERD (gastroesophageal reflux disease)    Hyperlipidemia    Onychomycosis    Gallstones    Localized edema    Peripheral vascular disease (HCC)    SOB (shortness of breath)    NALLELY (obstructive sleep apnea)    Moderate persistent asthma without complication    Multiple myeloma (HCC)    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)    Hiatal hernia    Weakness    Elevated troponin    Chronic obstructive pulmonary disease, unspecified (HCC)    Hypertensive chronic kidney disease w stg 1-4/unsp chr kdny    Mass of psoas muscle    CKD (chronic kidney disease)    Chronic diastolic (congestive) heart failure (HCC)    Lymphedema    Rash    Difficulty in walking, not elsewhere classified    Gout    Venous insufficiency (chronic) (peripheral)    Acute respiratory failure with hypoxia due to Pneumonia due to COVID-19 virus    Sepsis due to COVID-19 pneumonia (HCC)       Medications:  Current Facility-Administered Medications   Medication Dose Route Frequency Provider Last Rate Last Dose    acetaminophen (TYLENOL) tablet 650 mg  650 mg Oral Q6H PRN Jennifer Mckinney MD        acyclovir (ZOVIRAX) capsule 400 mg  400 mg Oral After Caitlyn Solis MD   400 mg at 06/25/20 1759    albuterol (PROVENTIL HFA,VENTOLIN HFA) inhaler 2 puff  2 puff Inhalation Q6H PRN Jennifer Mckinney MD        ascorbic acid (VITAMIN C) tablet 1,000 mg  1,000 mg Oral Q12H [address] Jennifer Mckinney MD   1,000 mg at 06/26/20 0912    atorvastatin (LIPITOR) tablet 40 mg  40 mg Oral After Caitlyn Solis MD   40 mg at 06/25/20 1759    bisacodyl (DULCOLAX) rectal suppository 10 mg  10 mg Rectal Daily PRN Jennifer Mckinney MD        ceftriaxone (ROCEPHIN) 1 g/50 mL in dextrose IVPB  1,000 mg Intravenous Q24H MD [place] cholecalciferol (VITAMIN D3) tablet 2,000 Units  2,000 Units Oral Daily Tr Alvarado MD   2,000 Units at 06/26/20 0913    clotrimazole (LOTRIMIN) 1 % cream   Topical Q12H [facility] Tr Alvarado MD        doxycycline hyclate (VIBRAMYCIN) capsule 100 mg  100 mg Oral Q12H Tr Alvarado MD   100 mg at 06/26/20 1114    fluticasone-vilanterol (BREO ELLIPTA) 200-25 MCG/INH inhaler 1 puff  1 puff Inhalation Daily Tr Alvarado MD   1 puff at 06/26/20 0914    insulin glargine (LANTUS) subcutaneous injection 26 Units 0 26 mL  26 Units Subcutaneous HS NICOLA Yarbrough        insulin lispro (HumaLOG) 100 units/mL subcutaneous injection 1-5 Units  1-5 Units Subcutaneous HS JOEY Askew   4 Units at 06/25/20 2143    insulin lispro (HumaLOG) 100 units/mL subcutaneous injection 2-12 Units  2-12 Units Subcutaneous TID AC Tr Alvarado MD   12 Units at 06/26/20 1114    insulin lispro (HumaLOG) 100 units/mL subcutaneous injection 8 Units  8 Units Subcutaneous TID With Meals NICOLA Yarbrough   8 Units at 06/26/20 1240    magnesium hydroxide (MILK OF MAGNESIA) 400 mg/5 mL oral suspension 30 mL  30 mL Oral Daily PRN Tr Alvarado MD        methylPREDNISolone sodium succinate (Solu-MEDROL) injection 125 mg  125 mg Intravenous Daily Tr Alvarado MD   125 mg at 06/26/20 0912    metoprolol tartrate (LOPRESSOR) tablet 25 mg  25 mg Oral Q12H Tr Alvarado MD   25 mg at 06/26/20 0912    moisture barrier miconazole 2% cream (aka KHADRA MOISTURE BARRIER ANTIFUNGAL CREAM)  1 application Topical TID Celina Mora MD        zinc sulfate (ZINCATE) capsule 220 mg  220 mg Oral Daily Tr Alvarado MD   220 mg at 06/26/20 0912    Followed by   Ane Half ON 7/2/2020] multivitamin-minerals (CENTRUM ADULTS) tablet 1 tablet  1 tablet Oral Daily Tr Alvarado MD        torsemide (DEMADEX) tablet 20 mg  20 mg Oral BID (diuretic) Tr Alvarado MD   20 mg at 06/26/20 0912    warfarin (COUMADIN) tablet 7 5 mg  7 5 mg Oral Daily (warfarin) Naima Lucia MD   7 5 mg at 06/25/20 5579     Assessment Findings:   1-L/R buttock with present on admission serous blister stage 2 pressure injury  L buttock sersous blister stage 2        2-L posterior thigh with POA DTI from prosthesis, wound presenting as linear purple non blanchable discoloration  3-L anterior thigh with POA stage 2 pressure injury form prosthesis  Wound bed 100% beefy red  Skin care plans:  1-Hydraguard to R heel BID and PRN  2-Elevate heels to offload pressure  3-Ehob cushion in chair when out of bed  4-Moisturize skin daily with skin nourishing cream   5-Turn/reposition q2h or when medically stable for pressure re-distribution on skin  6-Travis cream to sacrum, buttock and abdominal fold and groin TID and PRN  7-Allevyn foam to L posterior thigh DTI petra with T, change every three days  8-Dermagran and Allevyn foam to L anterior thigh wound, petra with T, change every three days  Vitals: Blood pressure 128/65, pulse 83, temperature (!) 97 4 °F (36 3 °C), resp  rate 20, height 6' 3" (1 905 m), weight (!) 154 kg (340 lb), SpO2 95 %  ,Body mass index is 42 5 kg/m²  Wound 06/25/20 Pressure Injury Buttocks Right;Left (Active)   Wound Image   6/25/2020  5:00 PM   Wound Description Fragile;Pink 6/26/2020  9:00 AM   Staging Stage II 6/26/2020  9:00 AM   Yi-wound Assessment Clean;Dry; Intact;Fragile 6/26/2020  9:00 AM   Drainage Amount None 6/26/2020  9:00 AM   Treatments Cleansed;Site care 6/25/2020  8:00 PM   Dressing Moisture barrier;Open to air 6/25/2020  8:00 PM   Dressing Changed New 6/26/2020  9:00 AM   Dressing Status Clean;Dry; Intact 6/26/2020  9:00 AM       Wound 06/26/20 Pressure Injury Thigh Anterior; Left (Active)   Wound Image   6/26/2020  2:45 AM   Wound Description Beefy red 6/26/2020  9:00 AM   Staging Stage II 6/26/2020  9:00 AM   Iy-wound Assessment Fragile;Erythema 6/26/2020  9:00 AM   Drainage Amount None 6/26/2020  9:00 AM   Treatments Cleansed 6/26/2020  2:45 AM   Dressing Non adherent 6/26/2020  2:45 AM   Dressing Changed New 6/26/2020  9:00 AM   Dressing Status Clean;Dry; Intact 6/26/2020  9:00 AM       Wound 06/26/20 Pressure Injury Buttocks Right (Active)   Wound Image   6/26/2020  9:29 AM   Wound Description Pink 6/26/2020  9:29 AM   Staging Stage II 6/26/2020  9:29 AM   Wound Length (cm) 2 cm 6/26/2020  9:29 AM   Wound Width (cm) 4 cm 6/26/2020  9:29 AM   Wound Depth (cm) 0 1 6/26/2020  9:29 AM   Calculated Wound Area (cm^2) 8 cm^2 6/26/2020  9:29 AM   Calculated Wound Volume (cm^3) 0 8 cm^3 6/26/2020  9:29 AM   Patient Tolerance Tolerated well 6/26/2020  9:29 AM       Wound 06/26/20 Pressure Injury Buttocks Left (Active)   Wound Image   6/26/2020  9:31 AM   Wound Description Pink 6/26/2020  9:31 AM   Staging Stage II 6/26/2020  9:31 AM   Wound Length (cm) 0 6 cm 6/26/2020  9:31 AM   Wound Width (cm) 1 7 cm 6/26/2020  9:31 AM   Calculated Wound Area (cm^2) 1 02 cm^2 6/26/2020  9:31 AM   Patient Tolerance Tolerated well 6/26/2020  9:31 AM       Wound 06/26/20 Pressure Injury Thigh Left;Posterior (Active)   Wound Image   6/26/2020  9:34 AM   Wound Description Light purple 6/26/2020  9:34 AM   Staging Deep Tissue Injury 6/26/2020  9:34 AM   Yi-wound Assessment Intact 6/26/2020  9:34 AM   Wound Length (cm) 0 7 cm 6/26/2020  9:34 AM   Wound Width (cm) 11 cm 6/26/2020  9:34 AM   Calculated Wound Area (cm^2) 7 7 cm^2 6/26/2020  9:34 AM   Patient Tolerance Tolerated well 6/26/2020  9:34 AM       Primary RN at bed side during assessment, wound care recommendations discussed and placed as nursing orders  Wound care to continue following, please call ext  [phone] or [phone] with questions or concerns  Benjamin Horton RN, BSN, [facility]

## 2020-06-26 NOTE — ASSESSMENT & PLAN NOTE
· Pt presents with 1 week symptoms at [facility]  · With elevated CRP, procalcitonin  Proc 2 now 4 42  · D-dimer 0 43   · patient on moderate pathway with steroids  · Continue ceftriaxone and doxycycline given elevated procalcitonin  Trend procalcitonin  · Continue supplemental oxygen to keep O2 saturation greater than 90%  Wean off as tolerated  · Patient immunocompromised as he is on chemo for multiple myeloma

## 2020-06-26 NOTE — TELEPHONE ENCOUNTER
Pt called from his hospital room in [place] to let Dr Willi Cooper know that he has been admitted  Also he said he may not be out in time to make his upcoming infusion appts

## 2020-06-26 NOTE — UTILIZATION REVIEW
Initial Clinical Review    Admission: Date/Time/Statement: Admission Orders (From admission, onward)     Ordered        06/25/20 1426  Inpatient Admission  Once                   Orders Placed This Encounter   Procedures    Inpatient Admission     Standing Status:   Standing     Number of Occurrences:   1     Order Specific Question:   Admitting Physician     Answer:   Vini Holly [[id number]]     Order Specific Question:   Level of Care     Answer:   Med Surg [[id number]]     Order Specific Question:   Estimated length of stay     Answer:   More than 2 Midnights     Order Specific Question:   Certification     Answer:   I certify that inpatient services are medically necessary for this patient for a duration of greater than two midnights  See H&P and MD Progress Notes for additional information about the patient's course of treatment  ED Arrival Information     Expected Arrival Acuity Means of Arrival Escorted By Service Admission Type    - 6/25/2020 11:43 Emergent Ambulance [place] Hospitalist Emergency    Arrival Complaint    SOB        Chief Complaint   Patient presents with    Shortness of Breath     Per EMS, pt has been genrally weak for two weeks with SOB and wet cough  Pt is 89% on RA upon arrival      Assessment/Plan:   Mr Garfield Love is a 82 yo male who presents to the ED via EMS from [facility] with c/o productive cough, progressively worsening SOB x 1 week, loose BMs, and possible fever  Was not checked for covid at facility  PMH: A fib, CHF, CKD, DM, HTN, Multiple myeloma undergoing treatment  Upon arrival in ED he was hypoxic and given oxygen with improvement, had elevated CRP and procalcitonin  He is admitted to INPATIENT status with Acute respiratory failure with hypoxia r/t Pneumonia r/t COVID 19 virus - IV steroids, IV antibiotics, trend procalcitonin, Supplemental oxygen to keep sats > 90%  Sepsis r/t COVID 19 virus - blood cultures    CKD - baseline 1 2 - on admission 1 4 - follow creat  Elevated troponin - non-MI troponin spill, probably r/t acute respiratory failure  Chronic d CHF - BNP elevation, home torsemide, if worsens IV diuretics  Rate controlled chronic A fib - coumadin, lopressor  Uncontrolled DM type 2 - lantus and SSI cover  6/26 pt had decreased oxygen to 2L then had desats into mid 80s during night with increase in oxygen to 6 L then back to 4L with increased lethargy  Does not have good glucose control today  BUN/Cr and Procalcitonin continue to increase  Lactic acid back to normal limitis        ED Triage Vitals   Temperature Pulse Respirations Blood Pressure SpO2   06/25/20 1146 06/25/20 1146 06/25/20 1146 06/25/20 1146 06/25/20 1146   98 °F (36 7 °C) 104 (!) 24 168/82 (!) 87 %      Temp Source Heart Rate Source Patient Position - Orthostatic VS BP Location FiO2 (%)   06/25/20 2213 06/25/20 1300 -- -- --   Rectal Monitor         Pain Score       06/25/20 1622       No Pain        Wt Readings from Last 1 Encounters:   06/26/20 (!) 154 kg (340 lb)     Additional Vital Signs:   06/26/20 0900  --  --  --  --  --  95 %  32  3 L/min  Nasal cannula   06/26/20 07:41:13  97 4 °F (36 3 °C)Abnormal   83  20  128/65  86  94 %  --  --  --   06/26/20 02:37:27  98 2 °F (36 8 °C)  76  20  129/64  86  93 %  --  --  --   06/26/20 00:18:24  98 3 °F (36 8 °C)  73  20  134/60  85  95 %  --  --  --   06/25/20 2213  99 2 °F (37 3 °C)  --  --  --  --  --  --  --  --   06/25/20 21:00:12  98 9 °F (37 2 °C)  81  --  131/65  87  87 %Abnormal   --  --  --   06/25/20 2000  --  --  --  --  --  --  36  4 L/min  Nasal cannula    O2 Device: pt desated to 85% at 06/25/20 2000 06/25/20 1729  --  --  --  --  --  92 %  28  2 L/min  Nasal cannula    O2 Device: pt weaned to 2 L from 4 at 06/25/20 1729   06/25/20 16:22:53  100 °F (37 8 °C)  102  20  150/79  103  97 %  36  4 L/min  Nasal cannula   06/25/20 1530  --  109Abnormal   24Abnormal   137/89  --  95 %  --  --  --   06/25/20 1400  --  96 24Abnormal   134/74  98  94 %  36  4 L/min  Nasal cannula   06/25/20 1300  --  106Abnormal   35Abnormal   134/74  --  98 %  --  --  --   06/25/20 1215  --  102  35Abnormal   149/70  --  98 %  36  4 L/min  Nasal cannula     Pertinent Labs/Diagnostic Test Results:     6/25 CXR -  Bilateral consolidations concerning for pneumonia In the setting of clinically suspected/proven COVID-19, this plain film appearance while nonspecific, can be seen in cases of viral pneumonia such as COVID-19     6/25 ECG - Sinus tachycardia with occasional Premature ventricular complexes  Nonspecific ST-t wave changes  Abnormal ECG    Results from last 7 days   Lab Units 06/25/20  1212   SARS-COV-2  Positive*     Results from last 7 days   Lab Units 06/26/20  0950 06/25/20 2215 06/25/20  1207   WBC Thousand/uL 7 12 6 27 7 71   HEMOGLOBIN g/dL 11 4* 10 9* 11 7*   HEMATOCRIT % 36 8 36 5 37 6   PLATELETS Thousands/uL 164 143* 158   NEUTROS ABS Thousands/µL 6 32  --  6 21         Results from last 7 days   Lab Units 06/26/20  0950 06/25/20 2215 06/25/20  1207   SODIUM mmol/L 136 136 135*   POTASSIUM mmol/L 5 1 4 6 3 9   CHLORIDE mmol/L 102 101 101   CO2 mmol/L 25 25 22   ANION GAP mmol/L 9 10 12   BUN mg/dL 51* 41* 33*   CREATININE mg/dL 1 86* 1 73* 1 47*   EGFR ml/min/1 73sq m 37 40 49   CALCIUM mg/dL 9 0 8 1* 8 0*   CALCIUM, IONIZED mmol/L  --   --  1 06*   MAGNESIUM mg/dL  --   --  1 7     Results from last 7 days   Lab Units 06/26/20  0950 06/25/20 2215 06/25/20  1207   AST U/L 61* 47* 30   ALT U/L 28 27 27   ALK PHOS U/L 150* 157* 163*   TOTAL PROTEIN g/dL 6 6 6 6 6 5   ALBUMIN g/dL 2 8* 2 9* 3 0*   TOTAL BILIRUBIN mg/dL 0 76 1 06* 1 35*   AMMONIA umol/L  --  19  --      Results from last 7 days   Lab Units 06/26/20  1108 06/26/20  0738 06/26/20  0200 06/25/20  2328 06/25/20  2102 06/25/20  1627   POC GLUCOSE mg/dl 496* 384* 371* 332* 348* 208*     Results from last 7 days   Lab Units 06/26/20  0950 06/25/20  2215 06/25/20  1207 GLUCOSE RANDOM mg/dL 448* 344* 192*         Results from last 7 days   Lab Units 06/25/20  1207 06/25/20  0650 06/25/20   HEMOGLOBIN A1C % 8 8* 9 3* 9 3   EAG mg/dl 206 220*  --          Results from last 7 days   Lab Units 06/25/20  2352   PH YOLA  7 260*   PCO2 YOLA mm Hg 52 2*   PO2 YOLA mm Hg 132 6*   HCO3 YOLA mmol/L 22 9*   BASE EXC YOLA mmol/L -4 5   O2 CONTENT YOLA ml/dL 16 8   O2 HGB, VENOUS % 95 9*         Results from last 7 days   Lab Units 06/25/20  1207   CK TOTAL U/L 271   CK MB INDEX % 1 5   CK MB ng/mL 4 1     Results from last 7 days   Lab Units 06/25/20  1207   TROPONIN I ng/mL 0 99*     Results from last 7 days   Lab Units 06/26/20  0950   D-DIMER QUANTITATIVE ug/ml FEU 0 43     Results from last 7 days   Lab Units 06/25/20  2215 06/25/20  1207   PROTIME seconds 17 9* 19 3*   INR  1 48* 1 63*         Results from last 7 days   Lab Units 06/26/20  0950 06/25/20  1717 06/25/20  1207   PROCALCITONIN ng/ml 4 42* 2 77* 1 42*     Results from last 7 days   Lab Units 06/25/20  2302 06/25/20  1718 06/25/20  1207   LACTIC ACID mmol/L 1 4 1 6 2 1*     Results from last 7 days   Lab Units 06/25/20  1207   NT-PRO BNP pg/mL 4,293*     Results from last 7 days   Lab Units 06/25/20  1207   FERRITIN ng/mL 326     Results from last 7 days   Lab Units 06/25/20  1207   CRP mg/L 163 0*     Results from last 7 days   Lab Units 06/25/20  1206   BLOOD CULTURE  Received in Microbiology Lab  Culture in Progress  Received in Microbiology Lab  Culture in Progress       ED Treatment:   Medication Administration from 06/25/2020 1143 to 06/25/2020 1616    Date/Time Order Dose Route Action   06/25/2020 1355 albuterol (PROVENTIL HFA,VENTOLIN HFA) inhaler 4 puff 4 puff Inhalation Given   06/25/2020 1356 ceftriaxone (ROCEPHIN) 1 g/50 mL in dextrose IVPB 1,000 mg Intravenous New Bag   06/25/2020 1424 doxycycline (VIBRAMYCIN) 100 mg in sodium chloride 0 9 % 100 mL IVPB 100 mg Intravenous New Bag   06/25/2020 1427 methylPREDNISolone sodium succinate (Solu-MEDROL) injection 125 mg 125 mg Intravenous Given        Past Medical History:   Diagnosis Date    Cancer ([place] [address] )     CHF (congestive heart failure) (HCC)     Chronic kidney disease     COPD (chronic obstructive pulmonary disease) (HCC)     Decubitus ulcer of heel     LAST ASSESSED [id number]    Diabetes mellitus ([place] [address] )     History of varicose veins     Hypertension     Intermittent claudication (HCC)     Neuropathy     Seasonal allergies      Present on Admission:   Chronic diastolic congestive heart failure (HCC)   Chronic atrial fibrillation (HCC)   CKD (chronic kidney disease)   Diabetes mellitus type 2, uncontrolled (HCC)   Chronic obstructive pulmonary disease, unspecified (HCC)   Elevated troponin   Essential hypertension    Admitting Diagnosis: SOB (shortness of breath) [R06 02]  Hypoxia [R09 02]  Sepsis ([place] [address] ) [A41 9]  COVID-19 virus infection [U07 1]     Age/Sex: 81 y o  male     Admission Orders:  Scheduled Medications:    Medications:  acyclovir 400 mg Oral After Dinner   ascorbic acid 1,000 mg Oral Q12H [address]   atorvastatin 40 mg Oral After Dinner   cefTRIAXone 1,000 mg Intravenous Q24H   cholecalciferol 2,000 Units Oral Daily   clotrimazole  Topical Q12H [address]   doxycycline hyclate 100 mg Oral Q12H   fluticasone-vilanterol 1 puff Inhalation Daily   insulin glargine 26 Units Subcutaneous HS   insulin lispro 1-5 Units Subcutaneous HS   insulin lispro 2-12 Units Subcutaneous TID AC   insulin lispro 8 Units Subcutaneous TID With Meals   methylPREDNISolone sodium succinate 125 mg Intravenous Daily   metoprolol tartrate 25 mg Oral Q12H   zinc sulfate 220 mg Oral Daily   Followed by      Blanca Andrew ON 7/2/2020] multivitamin-minerals 1 tablet Oral Daily   torsemide 20 mg Oral BID (diuretic)   warfarin 7 5 mg Oral Daily (warfarin)     Continuous IV Infusions:     PRN Meds:    acetaminophen 650 mg Oral Q6H PRN   albuterol 2 puff Inhalation Q6H PRN   bisacodyl 10 mg Rectal Daily PRN magnesium hydroxide 30 mL Oral Daily PRN       IP CONSULT TO WOUND CARE    Network Utilization Review Department  [email or web address]  ATTENTION: Please call with any questions or concerns to [phone] and carefully listen to the prompts so that you are directed to the right person  All voicemails are confidential   Smita Doing all requests for admission clinical reviews, approved or denied determinations and any other requests to dedicated fax number below belonging to the campus where the patient is receiving treatment   List of dedicated fax numbers for the Facilities:  [address] DENIALS (Administrative/Medical Necessity) [phone]   [address] (Maternity/NICU/Pediatrics) [phone]   Teetee Handing [phone]   Allyne Just [phone]   Serena De La Rosa [phone]   Janis Andree [phone]   [address] [place] [address] [address] [place] [address] [phone]   Dahiana Nj [phone]   [address] [facility], [address]  2401 [facility][note 3] [address] [facility] [phone]

## 2020-06-26 NOTE — ASSESSMENT & PLAN NOTE
Wt Readings from Last 3 Encounters:   06/26/20 (!) 154 kg (340 lb)   06/15/20 (!) 155 kg (341 lb 4 4 oz)   06/08/20 (!) 154 kg (340 lb 9 8 oz)     · Without exacerbation  · BNP elevated compared to baseline  · Continue monitor closely for now  · Continue home dose of torsemide for now  · If patient with worsening respiratory distress or increasing O2 requirements, can consider IV diuretics  · Daily weights

## 2020-06-26 NOTE — ASSESSMENT & PLAN NOTE
· Rate controlled  · Cont lopressor  · Coumadin for AC  · INR 1 63/1 48  Today's 1 61   · currently had been taking 7 5 mg, will give 10 mg tonight and monitor

## 2020-06-26 NOTE — PROGRESS NOTES
On assessment, pt o2 decreased to 85% on 2L NC  O2 increased to 6L, then was titrated to 4L  Also noticed that pt seemed more lethargic from what was received in report  Pt was arousable and able to answer questions correctly, but would quickly fall back asleep  Pt was also very diaphoretic, temp was 99 2 rectally, and blood glucose was 348  SLIM was notified  Pt continues to sleep, will continue to monitor

## 2020-06-26 NOTE — MALNUTRITION/BMI
This medical record reflects one or more clinical indicators suggestive of malnutrition and/or morbid obesity  Malnutrition Findings:              BMI Findings:  BMI Classifications: Morbid Obesity 40-44 9     Body mass index is 42 5 kg/m²  BMI is higher r/t AKA    See Nutrition note dated 6/26 for additional details  Completed nutrition assessment is viewable in the nutrition documentation

## 2020-06-26 NOTE — PROGRESS NOTES
[address] Internal Medicine   Progress Note - Jaydon Bender 1950, 81 y o  male MRN: [id number]    Unit/Bed#: [facility] 562-61 Encounter: [id number]    Primary Care Provider: Cleo Webb MD   Date and time admitted to hospital: 6/25/2020 11:43 AM        * Acute respiratory failure with hypoxia due to Pneumonia due to COVID-19 virus  Assessment & Plan  · Pt presents with 1 week [facility] at [facility]  · With elevated CRP, procalcitonin  Proc 2 now 4 42  · D-dimer 0 43   · patient on moderate pathway with steroids  · Continue ceftriaxone and doxycycline given elevated procalcitonin  Trend procalcitonin  · Continue supplemental oxygen to keep O2 saturation greater than 90%  Wean off as tolerated  · Patient immunocompromised as he is on chemo for multiple myeloma  Sepsis due to COVID-19 pneumonia [facility])  Assessment & Plan  · Patient presenting with tachycardia, tachypnea, hypoxia and confirm COVID-19 pneumonia  · Elevated procalcitonin  Continue antibiotics as above  · Follow-up blood cultures  Pending  Consult ID if positive     Acute renal failure (ARF) (HCC)  Assessment & Plan  · Acute kidney failure likely due to Sepsis evidenced by Cr 1 73 (baseline Cr 1 2) treated with NSS 500ml bolus in ED, renal assessment and lab monitoring  · Creatinine 1 73 today  · AM labs      Chronic atrial fibrillation (HCC)  Assessment & Plan  · Rate controlled  · Cont lopressor  · Coumadin for AC  · INR 1 63/1 48  Today's 1 61   · currently had been taking 7 5 mg, will give 10 mg tonight and monitor     Chronic diastolic congestive heart failure [facility])  Assessment & Plan  Wt Readings from Last 3 Encounters:   06/26/20 (!) 154 kg (340 lb)   06/15/20 (!) 155 kg (341 lb 4 4 oz)   06/08/20 (!) 154 kg (340 lb 9 8 oz)     · Without exacerbation  · BNP elevated compared to baseline  · Continue monitor closely for now  · Continue home dose of torsemide for now    · If patient with worsening respiratory distress or increasing O2 requirements, can consider IV diuretics  · Daily weights  Chronic obstructive [facility] disease, unspecified ([facility] )  Assessment & Plan  · Not in exacerbation  · Continue home inhalers  Elevated troponin  Assessment & Plan  · Suspect non MI troponin elevation the setting of acute respiratory failure from COVID-19 pneumonia  · Patient denies any chest pain  · Trend for now  0 99 yesterday    CKD (chronic kidney disease)  Assessment & Plan  · Baseline creatinine around 1 2   · Creatinine currently elevated 1 73 up from 1 43 yesterday  · Will hold tonight's Demadex dose and resume BID tomorrow  · Consult neph if not improvement tomorrow  · Continue monitor for now  Diabetes mellitus type 2, uncontrolled ([facility] )  Assessment & Plan  Lab Results   Component Value Date    HGBA1C 8 8 (H) 06/25/2020       Recent Labs     06/25/20  2328 06/26/20  0200 06/26/20  0738 06/26/20  1108   POCGLU 332* 371* 384* 496*       Blood Sugar Average: Last 72 hrs:  (P) 356 5   · Increased Lantus dosing to 26 units hs   · Added TID 8 units due to steroid use, adjust if not improvement     Essential hypertension  Assessment & Plan  · Well controlled  · Continue metoprolol and torsemide  VTE Pharmacologic Prophylaxis:   Pharmacologic: Warfarin (Coumadin)  Mechanical VTE Prophylaxis in Place: Yes    Patient Centered Rounds: I have performed bedside rounds with nursing staff today  Discussions with Specialists or Other Care Team Provider: none    Education and Discussions with Family / Patient: patient    Time Spent for Care: 20 minutes  More than 50% of total time spent on counseling and coordination of care as described above      Current Length of Stay: 1 day(s)    Current Patient Status: Inpatient   Certification Statement: The patient will continue to require additional inpatient hospital stay due to furhter evaluation and monitor of covid and penumonia and respiratory status     Discharge Plan: pending clinical improvement     Code Status: Level 1 - Full Code      Subjective:   Patient states he is still short of breath however not as bad as yesterday  States he is very weak which is what brought him him  Objective:     Vitals:   Temp (24hrs), Av 7 °F (37 1 °C), Min:97 4 °F (36 3 °C), Max:100 °F (37 8 °C)    Temp:  [97 4 °F (36 3 °C)-100 °F (37 8 °C)] 97 4 °F (36 3 °C)  HR:  [] 83  Resp:  [20-24] 20  BP: (128-150)/(60-89) 128/65  SpO2:  [87 %-97 %] 95 %  Body mass index is 42 5 kg/m²  Input and Output Summary (last 24 hours): Intake/Output Summary (Last 24 hours) at 2020 1420  Last data filed at 2020 1312  Gross per 24 hour   Intake 720 ml   Output 600 ml   Net 120 ml       Physical Exam:     Constitutional: Vitals 97 4  83  20  128/65  94% 3 liters   Respiratory: Respiratory pattern unlabored   No conversational Dypnea  Skin: negative jaundice  Musculoskeletal: No decreased ROM  Neuro: Alert and Oriented x 3, speech is clear   Psych: patient is not anxious, thoughts normal   Cardio : no JVD or Lower extremity edema         Additional Data:     Labs:    Results from last 7 days   Lab Units 20  0950   WBC Thousand/uL 7 12   HEMOGLOBIN g/dL 11 4*   HEMATOCRIT % 36 8   PLATELETS Thousands/uL 164   NEUTROS PCT % 89*   LYMPHS PCT % 7*   MONOS PCT % 3*   EOS PCT % 0     Results from last 7 days   Lab Units 20  0950   SODIUM mmol/L 136   POTASSIUM mmol/L 5 1   CHLORIDE mmol/L 102   CO2 mmol/L 25   BUN mg/dL 51*   CREATININE mg/dL 1 86*   ANION GAP mmol/L 9   CALCIUM mg/dL 9 0   ALBUMIN g/dL 2 8*   TOTAL BILIRUBIN mg/dL 0 76   ALK PHOS U/L 150*   ALT U/L 28   AST U/L 61*   GLUCOSE RANDOM mg/dL 448*     Results from last 7 days   Lab Units 20  0950   INR  1 61*     Results from last 7 days   Lab Units 20  1108 20  0738 20  0200 20  2328 20  2102 20  1627   POC GLUCOSE mg/dl 496* 384* 371* 332* 348* 208*     Results from last 7 days Lab Units 06/25/20  1207 06/25/20  0650 06/25/20   HEMOGLOBIN A1C % 8 8* 9 3* 9 3     Results from last 7 days   Lab Units 06/26/20  0950 06/25/20  2302 06/25/20  1718 06/25/20  1717 06/25/20  1207   LACTIC ACID mmol/L  --  1 4 1 6  --  2 1*   PROCALCITONIN ng/ml 4 42*  --   --  2 77* 1 42*           * I Have Reviewed All Lab Data Listed Above  * Additional Pertinent Lab Tests Reviewed: [address] Admission Reviewed    Imaging:    Imaging Reports Reviewed Today Include: none  Imaging Personally Reviewed by Myself Includes:  none    Recent Cultures (last 7 days):     Results from last 7 days   Lab Units 06/25/20  1206   BLOOD CULTURE  Received in Microbiology Lab  Culture in Progress  Received in Microbiology Lab  Culture in Progress         Last 24 Hours Medication List:     Current Facility-Administered Medications:  acetaminophen 650 mg Oral Q6H PRN Derek Rollins MD    acyclovir 400 mg Oral After Odessa Shabazz MD    albuterol 2 puff Inhalation Q6H PRN Derek Rollins MD    ascorbic acid 1,000 mg Oral Q12H [facility] Derek Rollins MD    atorvastatin 40 mg Oral After Odessa Shabazz MD    bisacodyl 10 mg Rectal Daily PRN Derek Rollins MD    cefTRIAXone 1,000 mg Intravenous Q24H Derek Rollins MD Last Rate: 1,000 mg (06/26/20 1418)   cholecalciferol 2,000 Units Oral Daily Derek Rollins MD    clotrimazole  Topical Q12H [facility] Derek Rollins MD    doxycycline hyclate 100 mg Oral Q12H Derek Rollins MD    fluticasone-vilanterol 1 puff Inhalation Daily Derek Rollins MD    insulin glargine 26 Units Subcutaneous HS NICOLA De Souza    insulin lispro 1-5 Units Subcutaneous HS Roxanna Hutton PA-C    insulin lispro 2-12 Units Subcutaneous TID AC Derek Rollins MD    insulin lispro 8 Units Subcutaneous TID With Meals NICOLA De Souza    magnesium hydroxide 30 mL Oral Daily PRN Derek Rollins MD    methylPREDNISolone sodium succinate 125 mg Intravenous Daily Roberto Carlos Wilkins MD    metoprolol tartrate 25 mg Oral Q12H MD Nora Mckoy Cava ANTIFUNGAL 1 application Topical TID Whit Malave MD    zinc sulfate 220 mg Oral Daily Roberto Carlos Wilkins MD    Followed by        Alicia Man ON 7/2/2020] multivitamin-minerals 1 tablet Oral Daily Roberto Carlos Wilkins MD    [START ON 6/27/2020] torsemide 20 mg Oral BID (diuretic) NICOLA Hummel    warfarin 10 mg Oral Once (warfarin) NICOLA Hummel    [START ON 6/27/2020] warfarin 7 5 mg Oral Daily (warfarin) NICOLA Hummel         Today, Patient Was Seen By: NICOLA Hummel    ** Please Note: Dictation voice to text software may have been used in the creation of this document   **

## 2020-06-26 NOTE — ASSESSMENT & PLAN NOTE
· Patient presenting with tachycardia, tachypnea, hypoxia and confirm COVID-19 pneumonia  · Elevated procalcitonin  Continue antibiotics as above  · Follow-up blood cultures  Pending    Consult ID if positive

## 2020-06-26 NOTE — PLAN OF CARE
Problem: PAIN - ADULT  Goal: Verbalizes/displays adequate comfort level or baseline comfort level  Description  Interventions:  - Encourage patient to monitor pain and request assistance  - Assess pain using appropriate pain scale  - Administer analgesics based on type and severity of pain and evaluate response  - Implement non-pharmacological measures as appropriate and evaluate response  - Consider cultural and social influences on pain and pain management  - Notify physician/advanced practitioner if interventions unsuccessful or patient reports new pain  Outcome: Progressing     Problem: INFECTION - ADULT  Goal: Absence or prevention of progression during hospitalization  Description  INTERVENTIONS:  - Assess and monitor for signs and symptoms of infection  - Monitor lab/diagnostic results  - Monitor all insertion sites, i e  indwelling lines, tubes, and drains  - Monitor endotracheal if appropriate and nasal secretions for changes in amount and color  - [place] appropriate cooling/warming therapies per order  - Administer medications as ordered  - Instruct and encourage patient and family to use good hand hygiene technique  - Identify and instruct in appropriate isolation precautions for identified infection/condition  Outcome: Progressing  Goal: Absence of fever/infection during neutropenic period  Description  INTERVENTIONS:  - Monitor WBC    Outcome: Progressing     Problem: SAFETY ADULT  Goal: Patient will remain free of falls  Description  INTERVENTIONS:  - Assess patient frequently for physical needs  -  Identify cognitive and physical deficits and behaviors that affect risk of falls    -  [place] fall precautions as indicated by assessment   - Educate patient/family on patient safety including physical limitations  - Instruct patient to call for assistance with activity based on assessment  - Modify environment to reduce risk of injury  - Consider OT/PT consult to assist with strengthening/mobility  Outcome: Progressing  Goal: Maintain or return to baseline ADL function  Description  INTERVENTIONS:  -  Assess patient's ability to carry out ADLs; assess patient's baseline for ADL function and identify physical deficits which impact ability to perform ADLs (bathing, care of mouth/teeth, toileting, grooming, dressing, etc )  - Assess/evaluate cause of self-care deficits   - Assess range of motion  - Assess patient's mobility; develop plan if impaired  - Assess patient's need for assistive devices and provide as appropriate  - Encourage maximum independence but intervene and supervise when necessary  - Involve family in performance of ADLs  - Assess for home care needs following discharge   - Consider OT consult to assist with ADL evaluation and planning for discharge  - Provide patient education as appropriate  Outcome: Progressing  Goal: Maintain or return mobility status to optimal level  Description  INTERVENTIONS:  - Assess patient's baseline mobility status (ambulation, transfers, stairs, etc )    - Identify cognitive and physical deficits and behaviors that affect mobility  - Identify mobility aids required to assist with transfers and/or ambulation (gait belt, sit-to-stand, lift, walker, cane, etc )  - Check fall precautions as indicated by assessment  - Record patient progress and toleration of activity level on Mobility SBAR; progress patient to next Phase/Stage  - Instruct patient to call for assistance with activity based on assessment  - Consider rehabilitation consult to assist with strengthening/weightbearing, etc   Outcome: Progressing     Problem: Prexisting or High Potential for Compromised Skin Integrity  Goal: Skin integrity is maintained or improved  Description  INTERVENTIONS:  - Identify patients at risk for skin breakdown  - Assess and monitor skin integrity  - Assess and monitor nutrition and hydration status  - Monitor labs   - Assess for incontinence   - Turn and reposition patient  - Assist with mobility/ambulation  - Relieve pressure over bony prominences  - Avoid friction and shearing  - Provide appropriate hygiene as needed including keeping skin clean and dry  - Evaluate need for skin moisturizer/barrier cream  - Collaborate with interdisciplinary team   - Patient/family teaching  - Consider wound care consult   Outcome: Progressing

## 2020-06-26 NOTE — SOCIAL WORK
Pt lives at [facility] has walker and W/c  He receives assistance with ADLS from staff  Facilitly or family transport  Son Aleksandra Heart is [phone]  Denies MH or Substance problems  Hx STR at [facility]  CM reviewed d/c planning process including the following: identifying help at home, patient preference for d/c planning needs, Discharge Lounge, [facility] Meds to Bed program, availability of treatment team to discuss questions or concerns patient and/or family may have regarding understanding medications and recognizing signs and symptoms once discharged  CM also encouraged patient to follow up with all recommended appointments after discharge  Patient advised of importance for patient and family to participate in managing patients medical well being

## 2020-06-26 NOTE — ASSESSMENT & PLAN NOTE
· Acute kidney failure likely due to Sepsis evidenced by Cr 1 73 (baseline Cr 1 2) treated with NSS 500ml bolus in ED, renal assessment and lab monitoring    · Creatinine 1 73 today  · AM labs

## 2020-06-26 NOTE — ASSESSMENT & PLAN NOTE
Lab Results   Component Value Date    HGBA1C 8 8 (H) 06/25/2020       Recent Labs     06/25/20  2328 06/26/20  0200 06/26/20  0738 06/26/20  1108   POCGLU 332* 371* 384* 496*       Blood Sugar Average: Last 72 hrs:  (P) 356 5   · Increased Lantus dosing to 26 units hs   · Added TID 8 units due to steroid use, adjust if not improvement

## 2020-06-26 NOTE — ASSESSMENT & PLAN NOTE
· Baseline creatinine around 1 2   · Creatinine currently elevated 1 73 up from 1 43 yesterday  · Will hold tonight's Demadex dose and resume BID tomorrow  · Consult neph if not improvement tomorrow  · Continue monitor for now

## 2020-06-27 LAB
ANION GAP SERPL CALCULATED.3IONS-SCNC: 8 MMOL/L (ref 4–13)
BUN SERPL-MCNC: 65 MG/DL (ref 5–25)
CALCIUM SERPL-MCNC: 8.6 MG/DL (ref 8.3–10.1)
CHLORIDE SERPL-SCNC: 104 MMOL/L (ref 100–108)
CO2 SERPL-SCNC: 25 MMOL/L (ref 21–32)
CREAT SERPL-MCNC: 1.62 MG/DL (ref 0.6–1.3)
CRP SERPL QL: 170 MG/L
ERYTHROCYTE [DISTWIDTH] IN BLOOD BY AUTOMATED COUNT: 15.5 % (ref 11.6–15.1)
FERRITIN SERPL-MCNC: 1322 NG/ML (ref 8–388)
GFR SERPL CREATININE-BSD FRML MDRD: 43 ML/MIN/1.73SQ M
GLUCOSE SERPL-MCNC: 262 MG/DL (ref 65–140)
GLUCOSE SERPL-MCNC: 285 MG/DL (ref 65–140)
GLUCOSE SERPL-MCNC: 298 MG/DL (ref 65–140)
GLUCOSE SERPL-MCNC: 308 MG/DL (ref 65–140)
GLUCOSE SERPL-MCNC: 427 MG/DL (ref 65–140)
HCT VFR BLD AUTO: 35.5 % (ref 36.5–49.3)
HGB BLD-MCNC: 10.8 G/DL (ref 12–17)
INR PPP: 1.9 (ref 0.84–1.19)
MCH RBC QN AUTO: 27.6 PG (ref 26.8–34.3)
MCHC RBC AUTO-ENTMCNC: 30.4 G/DL (ref 31.4–37.4)
MCV RBC AUTO: 91 FL (ref 82–98)
PLATELET # BLD AUTO: 190 THOUSANDS/UL (ref 149–390)
PMV BLD AUTO: 11.4 FL (ref 8.9–12.7)
POTASSIUM SERPL-SCNC: 4.5 MMOL/L (ref 3.5–5.3)
PROCALCITONIN SERPL-MCNC: 3.8 NG/ML
PROTHROMBIN TIME: 21.8 SECONDS (ref 11.6–14.5)
RBC # BLD AUTO: 3.91 MILLION/UL (ref 3.88–5.62)
SODIUM SERPL-SCNC: 137 MMOL/L (ref 136–145)
TROPONIN I SERPL-MCNC: 5.19 NG/ML
TROPONIN I SERPL-MCNC: 5.85 NG/ML
TROPONIN I SERPL-MCNC: 6.35 NG/ML
WBC # BLD AUTO: 9.8 THOUSAND/UL (ref 4.31–10.16)

## 2020-06-27 PROCEDURE — 85610 PROTHROMBIN TIME: CPT | Performed by: NURSE PRACTITIONER

## 2020-06-27 PROCEDURE — 82728 ASSAY OF FERRITIN: CPT | Performed by: NURSE PRACTITIONER

## 2020-06-27 PROCEDURE — 99232 SBSQ HOSP IP/OBS MODERATE 35: CPT | Performed by: INTERNAL MEDICINE

## 2020-06-27 PROCEDURE — 85027 COMPLETE CBC AUTOMATED: CPT | Performed by: NURSE PRACTITIONER

## 2020-06-27 PROCEDURE — 84484 ASSAY OF TROPONIN QUANT: CPT | Performed by: PHYSICIAN ASSISTANT

## 2020-06-27 PROCEDURE — 80048 BASIC METABOLIC PNL TOTAL CA: CPT | Performed by: NURSE PRACTITIONER

## 2020-06-27 PROCEDURE — 86140 C-REACTIVE PROTEIN: CPT | Performed by: NURSE PRACTITIONER

## 2020-06-27 PROCEDURE — 82948 REAGENT STRIP/BLOOD GLUCOSE: CPT

## 2020-06-27 PROCEDURE — 84145 PROCALCITONIN (PCT): CPT | Performed by: NURSE PRACTITIONER

## 2020-06-27 RX ORDER — INSULIN GLARGINE 100 [IU]/ML
35 INJECTION, SOLUTION SUBCUTANEOUS
Status: DISCONTINUED | OUTPATIENT
Start: 2020-06-27 | End: 2020-06-28

## 2020-06-27 RX ADMIN — CEFTRIAXONE SODIUM 1000 MG: 1 INJECTION, POWDER, FOR SOLUTION INTRAMUSCULAR; INTRAVENOUS at 15:11

## 2020-06-27 RX ADMIN — MICONAZOLE NITRATE 1 APPLICATION: 20 CREAM TOPICAL at 22:05

## 2020-06-27 RX ADMIN — OXYCODONE HYDROCHLORIDE AND ACETAMINOPHEN 1000 MG: 500 TABLET ORAL at 08:30

## 2020-06-27 RX ADMIN — WARFARIN SODIUM 7.5 MG: 7.5 TABLET ORAL at 17:26

## 2020-06-27 RX ADMIN — METOPROLOL TARTRATE 25 MG: 25 TABLET, FILM COATED ORAL at 22:04

## 2020-06-27 RX ADMIN — INSULIN GLARGINE 35 UNITS: 100 INJECTION, SOLUTION SUBCUTANEOUS at 22:07

## 2020-06-27 RX ADMIN — MICONAZOLE NITRATE 1 APPLICATION: 20 CREAM TOPICAL at 08:32

## 2020-06-27 RX ADMIN — CLOTRIMAZOLE: 10 CREAM TOPICAL at 22:04

## 2020-06-27 RX ADMIN — INSULIN LISPRO 8 UNITS: 100 INJECTION, SOLUTION INTRAVENOUS; SUBCUTANEOUS at 08:31

## 2020-06-27 RX ADMIN — TORSEMIDE 20 MG: 20 TABLET ORAL at 17:26

## 2020-06-27 RX ADMIN — INSULIN LISPRO 6 UNITS: 100 INJECTION, SOLUTION INTRAVENOUS; SUBCUTANEOUS at 08:31

## 2020-06-27 RX ADMIN — INSULIN LISPRO 6 UNITS: 100 INJECTION, SOLUTION INTRAVENOUS; SUBCUTANEOUS at 17:25

## 2020-06-27 RX ADMIN — INSULIN LISPRO 8 UNITS: 100 INJECTION, SOLUTION INTRAVENOUS; SUBCUTANEOUS at 11:12

## 2020-06-27 RX ADMIN — ATORVASTATIN CALCIUM 40 MG: 40 TABLET, FILM COATED ORAL at 17:26

## 2020-06-27 RX ADMIN — FLUTICASONE FUROATE AND VILANTEROL TRIFENATATE 1 PUFF: 200; 25 POWDER RESPIRATORY (INHALATION) at 08:30

## 2020-06-27 RX ADMIN — TORSEMIDE 20 MG: 20 TABLET ORAL at 08:30

## 2020-06-27 RX ADMIN — METHYLPREDNISOLONE SODIUM SUCCINATE 125 MG: 125 INJECTION, POWDER, FOR SOLUTION INTRAMUSCULAR; INTRAVENOUS at 08:31

## 2020-06-27 RX ADMIN — ZINC SULFATE 220 MG (50 MG) CAPSULE 220 MG: CAPSULE at 08:30

## 2020-06-27 RX ADMIN — INSULIN LISPRO 2 UNITS: 100 INJECTION, SOLUTION INTRAVENOUS; SUBCUTANEOUS at 22:03

## 2020-06-27 RX ADMIN — DOXYCYCLINE 100 MG: 100 CAPSULE ORAL at 22:03

## 2020-06-27 RX ADMIN — MELATONIN 2000 UNITS: at 08:30

## 2020-06-27 RX ADMIN — CLOTRIMAZOLE: 10 CREAM TOPICAL at 08:30

## 2020-06-27 RX ADMIN — ACYCLOVIR 400 MG: 200 CAPSULE ORAL at 17:28

## 2020-06-27 RX ADMIN — OXYCODONE HYDROCHLORIDE AND ACETAMINOPHEN 1000 MG: 500 TABLET ORAL at 22:03

## 2020-06-27 RX ADMIN — INSULIN LISPRO 12 UNITS: 100 INJECTION, SOLUTION INTRAVENOUS; SUBCUTANEOUS at 11:11

## 2020-06-27 RX ADMIN — METOPROLOL TARTRATE 25 MG: 25 TABLET, FILM COATED ORAL at 08:30

## 2020-06-27 RX ADMIN — DOXYCYCLINE 100 MG: 100 CAPSULE ORAL at 11:11

## 2020-06-27 RX ADMIN — MICONAZOLE NITRATE 1 APPLICATION: 20 CREAM TOPICAL at 17:28

## 2020-06-27 NOTE — ASSESSMENT & PLAN NOTE
· Patient presenting with tachycardia, tachypnea, hypoxia and confirm COVID-19 pneumonia  · Elevated procalcitonin  Continue antibiotics as above    · Blood cultures negative so far

## 2020-06-27 NOTE — ASSESSMENT & PLAN NOTE
Wt Readings from Last 3 Encounters:   06/27/20 (!) 157 kg (346 lb 3 2 oz)   06/15/20 (!) 155 kg (341 lb 4 4 oz)   06/08/20 (!) 154 kg (340 lb 9 8 oz)     · Volume status is adequate  · Monitor intake and output  · Monitor daily weights  · Continue home dose of torsemide for now

## 2020-06-27 NOTE — PROGRESS NOTES
Progress Note - Leon Jermyn 1950, 81 y o  male MRN: [id number]    Unit/Bed#: [facility] 819-01 Encounter: [id number]    Primary Care Provider: Marilyn Harden MD   Date and time admitted to hospital: 6/25/2020 11:43 AM        * Acute respiratory failure with hypoxia due to Pneumonia due to COVID-19 virus  Assessment & Plan  · Pt presents with 1 week symptoms at [facility]  · Currently receiving COVID moderate treatment algorithm  · Continue ceftriaxone and doxycycline given elevated procalcitonin, continue to trend  · Continue vitamin-C, vitamin-D, zinc supplementation  · Monitor temps, WBC, procalcitonin, inflammatory markers  · Check IL 6    Sepsis due to COVID-19 pneumonia [facility])  Assessment & Plan  · Patient presenting with tachycardia, tachypnea, hypoxia and confirm COVID-19 pneumonia  · Elevated procalcitonin  Continue antibiotics as above  · Blood cultures negative so far    CKD (chronic kidney disease)  Assessment & Plan  · Baseline creatinine around 1 2   · Management as above    [facility]ronic [facility] pulmonary disease, unspecified [facility]  Assessment & Plan  · Stable without any wheezing  · Continue home inhalers  Elevated troponin  Assessment & Plan  · Suspect non MI troponin elevation the setting of acute respiratory failure from COVID-19 pneumonia  · Patient denies any chest pain or shortness of breath  · Troponin has increased from 0 9 to 6 3  · Will continue warfarin for anticoagulation given his lack of symptoms  · Cardiology evaluation    Acute renal failure (ARF) ([facility] )  Assessment & Plan  · Acute kidney failure likely due to Sepsis evidenced by Cr 1 73 (baseline Cr 1 2)  · Improved today  · No IVF due to his history of CHF  · Avoid nephrotoxins, hypotension    · Monitor BMP    Chronic diastolic congestive heart failure [facility])  Assessment & Plan  Wt Readings from Last 3 Encounters:   06/27/20 (!) 157 kg (346 lb 3 2 oz)   06/15/20 (!) 155 kg (341 lb 4 4 oz)   06/08/20 (!) 154 kg (340 lb 9 8 oz)     · Volume status is adequate  · Monitor intake and output  · Monitor daily weights  · Continue home dose of torsemide for now  Chronic atrial fibrillation (HCC)  Assessment & Plan  · Rate controlled with metoprololCont lopressor  · Warfarin for anticoagulation  · Monitor INR daily    Essential hypertension  Assessment & Plan  · Well controlled  · Continue metoprolol and torsemide  Diabetes mellitus type 2, uncontrolled ([facility] )  Assessment & Plan  Lab Results   Component Value Date    HGBA1C 8 8 (H) 2020       Recent Labs     20  1623 20  2100 20  0752 20  1102   POCGLU 371* 333* 285* 427*       Blood Sugar Average: Last 72 hrs:  (P) 355 5   · Uncontrolled due to IV steroid use  · Diabetic diet  · Increase Lantus to 35  · Increase Humalog to 12 units with meals  · Continue sliding scale coverage        VTE Pharmacologic Prophylaxis:   Pharmacologic: Warfarin (Coumadin)  Mechanical VTE Prophylaxis in Place: Yes    Patient Centered Rounds: I have performed bedside rounds with nursing staff today  Education and Discussions with Family / Patient:  Patient, plan of care    Time Spent for Care: 20 minutes  More than 50% of total time spent on counseling and coordination of care as described above  Current Length of Stay: 2 day(s)    Current Patient Status: Inpatient   Certification Statement: The patient will continue to require additional inpatient hospital stay due to [address] treatment    Discharge Plan:  Home when stable    Code Status: Level 1 - Full Code      Subjective:   Denies any acute complaints today  Objective:     Vitals:   Temp (24hrs), Av 2 °F (36 8 °C), Min:97 8 °F (36 6 °C), Max:98 8 °F (37 1 °C)    Temp:  [97 8 °F (36 6 °C)-98 8 °F (37 1 °C)] 97 9 °F (36 6 °C)  HR:  [76-96] 76  Resp:  [18-24] 18  BP: (114-141)/(66-89) 114/66  SpO2:  [96 %-98 %] 96 %  Body mass index is 43 27 kg/m²       Input and Output Summary (last 24 hours): Intake/Output Summary (Last 24 hours) at 6/27/2020 1546  Last data filed at 6/27/2020 1100  Gross per 24 hour   Intake 460 ml   Output 1475 ml   Net -1015 ml       Physical Exam:     Physical Exam   Constitutional: He is oriented to person, place, and time  Obese elderly male, lying in bed, appears comfortable   HENT:   Head: Normocephalic and atraumatic  Eyes: Pupils are equal, round, and reactive to light  EOM are normal    Neck: Neck supple  Cardiovascular: Normal rate and regular rhythm  Pulmonary/Chest: Effort normal    Abdominal: Soft  Musculoskeletal: He exhibits no edema  Neurological: He is alert and oriented to person, place, and time  Skin: Skin is warm and dry  Psychiatric: He has a normal mood and affect   His behavior is normal          Additional Data:     Labs:    Results from last 7 days   Lab Units 06/27/20  0505 06/26/20  0950   WBC Thousand/uL 9 80 7 12   HEMOGLOBIN g/dL 10 8* 11 4*   HEMATOCRIT % 35 5* 36 8   PLATELETS Thousands/uL 190 164   NEUTROS PCT %  --  89*   LYMPHS PCT %  --  7*   MONOS PCT %  --  3*   EOS PCT %  --  0     Results from last 7 days   Lab Units 06/27/20  0505 06/26/20  0950   SODIUM mmol/L 137 136   POTASSIUM mmol/L 4 5 5 1   CHLORIDE mmol/L 104 102   CO2 mmol/L 25 25   BUN mg/dL 65* 51*   CREATININE mg/dL 1 62* 1 86*   ANION GAP mmol/L 8 9   CALCIUM mg/dL 8 6 9 0   ALBUMIN g/dL  --  2 8*   TOTAL BILIRUBIN mg/dL  --  0 76   ALK PHOS U/L  --  150*   ALT U/L  --  28   AST U/L  --  61*   GLUCOSE RANDOM mg/dL 308* 448*     Results from last 7 days   Lab Units 06/27/20  0505   INR  1 90*     Results from last 7 days   Lab Units 06/27/20  1102 06/27/20  0752 06/26/20  2100 06/26/20  1623 06/26/20  1108 06/26/20  0738 06/26/20  0200 06/25/20  2328 06/25/20  2102 06/25/20  1627   POC GLUCOSE mg/dl 427* 285* 333* 371* 496* 384* 371* 332* 348* 208*     Results from last 7 days   Lab Units 06/25/20  1207 06/25/20  0650 06/25/20   HEMOGLOBIN A1C % 8 8* 9 3* 9 3 Results from last 7 days   Lab Units 06/27/20  0505 06/26/20  1454 06/26/20  0950 06/25/20  2302 06/25/20  1718 06/25/20  1717 06/25/20  1207   LACTIC ACID mmol/L  --   --   --  1 4 1 6  --  2 1*   PROCALCITONIN ng/ml 3 80* 3 59* 4 42*  --   --  2 77* 1 42*           * I Have Reviewed All Lab Data Listed Above  * Additional Pertinent Lab Tests Reviewed: All Labs Within Last 24 Hours Reviewed      Recent Cultures (last 7 days):     Results from last 7 days   Lab Units 06/25/20  1206   BLOOD CULTURE  No Growth at 24 hrs  No Growth at 24 hrs         Last 24 Hours Medication List:     Current Facility-Administered Medications:  acetaminophen 650 mg Oral Q6H PRN Aiden To MD    acyclovir 400 mg Oral After Mellissa Doll MD    albuterol 2 puff Inhalation Q6H PRN Aiden To MD    ascorbic acid 1,000 mg Oral Q12H [facility] Aiden To MD    atorvastatin 40 mg Oral After Mellissa Doll MD    bisacodyl 10 mg Rectal Daily PRN Aiden To MD    cefTRIAXone 1,000 mg Intravenous Q24H Aiden To MD Last Rate: 1,000 mg (06/27/20 1511)   cholecalciferol 2,000 Units Oral Daily Aiden To MD    clotrimazole  Topical Q12H Fer Fregoso MD    doxycycline hyclate 100 mg Oral Q12H Aiden To MD    fluticasone-vilanterol 1 puff Inhalation Daily Aiden To MD    insulin glargine 35 Units Subcutaneous HS Deepak Martinez MD    insulin lispro 1-5 Units Subcutaneous HS Erik Miller PA-C    insulin lispro 12 Units Subcutaneous TID With Meals Deepak Martinez MD    insulin lispro 2-12 Units Subcutaneous TID AC Aiden To MD    magnesium hydroxide 30 mL Oral Daily PRN Aiden To MD    methylPREDNISolone sodium succinate 125 mg Intravenous Daily Aiden To MD    metoprolol tartrate 25 mg Oral Q12H MD Kendrick Drake ANTIFUNGAL 1 application Topical TID Deepak Martinez MD    zinc sulfate 220 mg Oral Daily Yoli Driscoll MD    Followed by        Jeff Sexton ON 7/2/2020] multivitamin-minerals 1 tablet Oral Daily Yoli Driscoll MD    torsemide 20 mg Oral BID (diuretic) NICOLA Patel    warfarin 7 5 mg Oral Daily (warfarin) NICOLA Patel         Today, Patient Was Seen By: Luis Wilson MD    ** Please Note: Dictation voice to text software may have been used in the creation of this document   **

## 2020-06-27 NOTE — ASSESSMENT & PLAN NOTE
Lab Results   Component Value Date    HGBA1C 8 8 (H) 06/25/2020       Recent Labs     06/26/20  1623 06/26/20  2100 06/27/20  0752 06/27/20  1102   POCGLU 371* 333* 285* 427*       Blood Sugar Average: Last 72 hrs:  (P) 355 5   · Uncontrolled due to IV steroid use  · Diabetic diet  · Increase Lantus to 35  · Increase Humalog to 12 units with meals  · Continue sliding scale coverage

## 2020-06-27 NOTE — PLAN OF CARE
Problem: SAFETY ADULT  Goal: Patient will remain free of falls  Description  INTERVENTIONS:  - Assess patient frequently for physical needs  -  Identify cognitive and physical deficits and behaviors that affect risk of falls  -  [place] fall precautions as indicated by assessment   - Educate patient/family on patient safety including physical limitations  - Instruct patient to call for assistance with activity based on assessment  - Modify environment to reduce risk of injury  - Consider OT/PT consult to assist with strengthening/mobility  Outcome: Progressing     Problem: Potential for Falls  Goal: Patient will remain free of falls  Description  INTERVENTIONS:  - Assess patient frequently for physical needs  -  Identify cognitive and physical deficits and behaviors that affect risk of falls    -  [place] fall precautions as indicated by assessment   - Educate patient/family on patient safety including physical limitations  - Instruct patient to call for assistance with activity based on assessment  - Modify environment to reduce risk of injury  - Consider OT/PT consult to assist with strengthening/mobility  Outcome: Progressing

## 2020-06-27 NOTE — ASSESSMENT & PLAN NOTE
· Acute kidney failure likely due to Sepsis evidenced by Cr 1 73 (baseline Cr 1 2)  · Improved today  · No IVF due to his history of CHF  · Avoid nephrotoxins, hypotension    · Monitor BMP

## 2020-06-27 NOTE — ASSESSMENT & PLAN NOTE
· Suspect non MI troponin elevation the setting of acute respiratory failure from COVID-19 pneumonia  · Patient denies any chest pain or shortness of breath  · Troponin has increased from 0 9 to 6 3  · Will continue warfarin for anticoagulation given his lack of symptoms    · Cardiology evaluation

## 2020-06-27 NOTE — ASSESSMENT & PLAN NOTE
· Pt presents with 1 week symptoms at [facility]  · Currently receiving COVID moderate treatment algorithm  · Continue ceftriaxone and doxycycline given elevated procalcitonin, continue to trend  · Continue vitamin-C, vitamin-D, zinc supplementation  · Monitor temps, WBC, procalcitonin, inflammatory markers  · Check IL 6

## 2020-06-27 NOTE — ASSESSMENT & PLAN NOTE
· Rate controlled with metoprololCont lopressor  · Warfarin for anticoagulation  · Monitor INR daily

## 2020-06-28 PROBLEM — I21.A1 TYPE 2 ACUTE MYOCARDIAL INFARCTION (HCC): Status: ACTIVE | Noted: 2019-10-29

## 2020-06-28 LAB
ANION GAP SERPL CALCULATED.3IONS-SCNC: 8 MMOL/L (ref 4–13)
BASOPHILS # BLD AUTO: 0.01 THOUSANDS/ΜL (ref 0–0.1)
BASOPHILS NFR BLD AUTO: 0 % (ref 0–1)
BUN SERPL-MCNC: 73 MG/DL (ref 5–25)
CALCIUM SERPL-MCNC: 8.2 MG/DL (ref 8.3–10.1)
CHLORIDE SERPL-SCNC: 106 MMOL/L (ref 100–108)
CO2 SERPL-SCNC: 23 MMOL/L (ref 21–32)
CREAT SERPL-MCNC: 1.43 MG/DL (ref 0.6–1.3)
CRP SERPL QL: 84.7 MG/L
EOSINOPHIL # BLD AUTO: 0 THOUSAND/ΜL (ref 0–0.61)
EOSINOPHIL NFR BLD AUTO: 0 % (ref 0–6)
ERYTHROCYTE [DISTWIDTH] IN BLOOD BY AUTOMATED COUNT: 15.5 % (ref 11.6–15.1)
FERRITIN SERPL-MCNC: 1682 NG/ML (ref 8–388)
GFR SERPL CREATININE-BSD FRML MDRD: 50 ML/MIN/1.73SQ M
GLUCOSE SERPL-MCNC: 189 MG/DL (ref 65–140)
GLUCOSE SERPL-MCNC: 207 MG/DL (ref 65–140)
GLUCOSE SERPL-MCNC: 209 MG/DL (ref 65–140)
GLUCOSE SERPL-MCNC: 214 MG/DL (ref 65–140)
GLUCOSE SERPL-MCNC: 226 MG/DL (ref 65–140)
HCT VFR BLD AUTO: 36.7 % (ref 36.5–49.3)
HGB BLD-MCNC: 10.9 G/DL (ref 12–17)
IMM GRANULOCYTES # BLD AUTO: 0.06 THOUSAND/UL (ref 0–0.2)
IMM GRANULOCYTES NFR BLD AUTO: 1 % (ref 0–2)
INR PPP: 2.37 (ref 0.84–1.19)
LYMPHOCYTES # BLD AUTO: 0.3 THOUSANDS/ΜL (ref 0.6–4.47)
LYMPHOCYTES NFR BLD AUTO: 3 % (ref 14–44)
MCH RBC QN AUTO: 27.7 PG (ref 26.8–34.3)
MCHC RBC AUTO-ENTMCNC: 29.7 G/DL (ref 31.4–37.4)
MCV RBC AUTO: 93 FL (ref 82–98)
MONOCYTES # BLD AUTO: 0.33 THOUSAND/ΜL (ref 0.17–1.22)
MONOCYTES NFR BLD AUTO: 4 % (ref 4–12)
NEUTROPHILS # BLD AUTO: 8.23 THOUSANDS/ΜL (ref 1.85–7.62)
NEUTS SEG NFR BLD AUTO: 92 % (ref 43–75)
NRBC BLD AUTO-RTO: 1 /100 WBCS
PLATELET # BLD AUTO: 194 THOUSANDS/UL (ref 149–390)
PMV BLD AUTO: 11.1 FL (ref 8.9–12.7)
POTASSIUM SERPL-SCNC: 4.3 MMOL/L (ref 3.5–5.3)
PROCALCITONIN SERPL-MCNC: 1.97 NG/ML
PROTHROMBIN TIME: 25.7 SECONDS (ref 11.6–14.5)
RBC # BLD AUTO: 3.93 MILLION/UL (ref 3.88–5.62)
SODIUM SERPL-SCNC: 137 MMOL/L (ref 136–145)
WBC # BLD AUTO: 8.93 THOUSAND/UL (ref 4.31–10.16)

## 2020-06-28 PROCEDURE — 99221 1ST HOSP IP/OBS SF/LOW 40: CPT | Performed by: INTERNAL MEDICINE

## 2020-06-28 PROCEDURE — 85025 COMPLETE CBC W/AUTO DIFF WBC: CPT | Performed by: INTERNAL MEDICINE

## 2020-06-28 PROCEDURE — 80048 BASIC METABOLIC PNL TOTAL CA: CPT | Performed by: INTERNAL MEDICINE

## 2020-06-28 PROCEDURE — 86140 C-REACTIVE PROTEIN: CPT | Performed by: INTERNAL MEDICINE

## 2020-06-28 PROCEDURE — 82948 REAGENT STRIP/BLOOD GLUCOSE: CPT

## 2020-06-28 PROCEDURE — 99232 SBSQ HOSP IP/OBS MODERATE 35: CPT | Performed by: INTERNAL MEDICINE

## 2020-06-28 PROCEDURE — 83520 IMMUNOASSAY QUANT NOS NONAB: CPT | Performed by: INTERNAL MEDICINE

## 2020-06-28 PROCEDURE — 85610 PROTHROMBIN TIME: CPT | Performed by: NURSE PRACTITIONER

## 2020-06-28 PROCEDURE — 82728 ASSAY OF FERRITIN: CPT | Performed by: INTERNAL MEDICINE

## 2020-06-28 PROCEDURE — 84145 PROCALCITONIN (PCT): CPT | Performed by: NURSE PRACTITIONER

## 2020-06-28 RX ORDER — INSULIN GLARGINE 100 [IU]/ML
42 INJECTION, SOLUTION SUBCUTANEOUS
Status: DISCONTINUED | OUTPATIENT
Start: 2020-06-28 | End: 2020-06-29

## 2020-06-28 RX ADMIN — INSULIN GLARGINE 42 UNITS: 100 INJECTION, SOLUTION SUBCUTANEOUS at 22:42

## 2020-06-28 RX ADMIN — INSULIN LISPRO 4 UNITS: 100 INJECTION, SOLUTION INTRAVENOUS; SUBCUTANEOUS at 08:14

## 2020-06-28 RX ADMIN — INSULIN LISPRO 4 UNITS: 100 INJECTION, SOLUTION INTRAVENOUS; SUBCUTANEOUS at 17:30

## 2020-06-28 RX ADMIN — ZINC SULFATE 220 MG (50 MG) CAPSULE 220 MG: CAPSULE at 08:15

## 2020-06-28 RX ADMIN — DOXYCYCLINE 100 MG: 100 CAPSULE ORAL at 11:58

## 2020-06-28 RX ADMIN — ATORVASTATIN CALCIUM 40 MG: 40 TABLET, FILM COATED ORAL at 17:30

## 2020-06-28 RX ADMIN — OXYCODONE HYDROCHLORIDE AND ACETAMINOPHEN 1000 MG: 500 TABLET ORAL at 22:39

## 2020-06-28 RX ADMIN — TORSEMIDE 20 MG: 20 TABLET ORAL at 17:30

## 2020-06-28 RX ADMIN — METOPROLOL TARTRATE 25 MG: 25 TABLET, FILM COATED ORAL at 22:39

## 2020-06-28 RX ADMIN — MICONAZOLE NITRATE 1 APPLICATION: 20 CREAM TOPICAL at 08:16

## 2020-06-28 RX ADMIN — CEFTRIAXONE SODIUM 1000 MG: 1 INJECTION, POWDER, FOR SOLUTION INTRAMUSCULAR; INTRAVENOUS at 15:28

## 2020-06-28 RX ADMIN — ACYCLOVIR 400 MG: 200 CAPSULE ORAL at 17:32

## 2020-06-28 RX ADMIN — INSULIN LISPRO 1 UNITS: 100 INJECTION, SOLUTION INTRAVENOUS; SUBCUTANEOUS at 22:39

## 2020-06-28 RX ADMIN — DOXYCYCLINE 100 MG: 100 CAPSULE ORAL at 22:39

## 2020-06-28 RX ADMIN — CLOTRIMAZOLE: 10 CREAM TOPICAL at 08:16

## 2020-06-28 RX ADMIN — OXYCODONE HYDROCHLORIDE AND ACETAMINOPHEN 1000 MG: 500 TABLET ORAL at 08:15

## 2020-06-28 RX ADMIN — FLUTICASONE FUROATE AND VILANTEROL TRIFENATATE 1 PUFF: 200; 25 POWDER RESPIRATORY (INHALATION) at 08:16

## 2020-06-28 RX ADMIN — METOPROLOL TARTRATE 25 MG: 25 TABLET, FILM COATED ORAL at 08:15

## 2020-06-28 RX ADMIN — MICONAZOLE NITRATE 1 APPLICATION: 20 CREAM TOPICAL at 17:31

## 2020-06-28 RX ADMIN — TORSEMIDE 20 MG: 20 TABLET ORAL at 08:15

## 2020-06-28 RX ADMIN — WARFARIN SODIUM 7.5 MG: 7.5 TABLET ORAL at 17:30

## 2020-06-28 RX ADMIN — MELATONIN 2000 UNITS: at 08:15

## 2020-06-28 RX ADMIN — METHYLPREDNISOLONE SODIUM SUCCINATE 125 MG: 125 INJECTION, POWDER, FOR SOLUTION INTRAMUSCULAR; INTRAVENOUS at 08:14

## 2020-06-28 RX ADMIN — INSULIN LISPRO 4 UNITS: 100 INJECTION, SOLUTION INTRAVENOUS; SUBCUTANEOUS at 11:59

## 2020-06-28 NOTE — CONSULTS
Consultation - Cardiology Team One  Rito Alpers 81 y o  male MRN: [id number]  Unit/Bed#: [facility] 821-01 Encounter: [id number]    Inpatient consult to Cardiology  Consult performed by: NICOLA Oh  Consult ordered by: Lencho Slade MD      Physician Requesting Consult: Lencho Slade MD  Reason for Consult / Principal Problem: Elevated troponin       Assessment/ Plan    1  Elevated troponin likely non MI elevated troponin in the setting of COVID 19 vs type II MI  Troponin 0 99/5 69/5 77/6 3/5 8/5 19  ECG reviewed showing atrial fibrillation with non specific ST and T wave abnormalities and PVCs  Patient reporting chest pressure when he takes a deep breath  He denies chest pain otherwise  He lives at sedentary lifestyle and gets around with wheelchair  At this time, hold off on ischemic workup and allow recovery of COVID 19  Follow up in the office  2  Acute respiratory failure with hypoxia in the setting of pneumonia due to COVID 19   On antibiotic: rocephin and doxycycline and solumedrol IV daily   Followed by primary team    3  Chronic diastolic heart failure  On torsemide 20 mg PO BID (home dose diuretic)   Monitor I/Os -1 6 L in 24 hours   Daily weight 339 lbs (stable)     4  Chronic atrial fibrillation   On  Metoprolol 25 mg PO BID  On coumadin for OAC  INR 2 37    5  CKD- creatinine 1 43 today (baseline)     6  Hypertension 115/70 average BP     7  Hyperlipidemia LDL 48 (2/26/2019)     8  Type 2 diabetes- hemoglobin A1c 8 8  On insulin followed by primary team      History of Present Illness   HPI: Rito Alpers is a 81y o  year old male who has a history of chronic atrial fibrillation, chronic diastolic heart failure, hypertension, hyperlipidemia, DVT, type II diabetes, CKD, PAD s/p L 4th toe amputation and multiple myeloma  He follows with cardiologist Dr Marlen Christensen       He presents to [facility] ER 06/25/2020 via EMS from [address] [facility] with complaint cough, shortness of breath and weakness  O2 saturation on arrival was 87%  Patient was positive for COVID-19 and started on protocol  Cardiology consulted today for elevated troponin  Troponin 0 99/5 69/5 77/6 3/5 8/5  19  ECG reviewed showing atrial fibrillation with non specific ST and T wave abnormalities and PVCs  Patient reports chest pressure with deep inhalation  He reports no complaint of chest pain at rest or with exertion  He lives at sedentary lifestyle and gets around with wheelchair  He reports he is feeling better today  Cough and SOB greatly improved  He denies fever or chills  Echocardiogram 04/04/2019 showed EF 60%, no regional wall motion abnormalities, mild concentric hypertrophy, grade 3 diastolic dysfunction, dilated left and right atrium and trace MR  Nuclear stress test 03/04/2016 showed normal study  Perfusion imaging showed no abnormality and LV systolic function was normal calculated 53%  EKG reviewed personally:  Atrial fibrillation with nonspecific ST and T-wave abnormalities and PVCs  Ventricular rate 100 beats per minute  QRS D 116 milliseconds  QT interval 340 milliseconds  QTC interval 438 milliseconds    Telemetry reviewed personally:   No telemetry    Review of Systems   Constitution: Negative for chills, fever and malaise/fatigue  Cardiovascular: Positive for dyspnea on exertion  Negative for chest pain, leg swelling, orthopnea and palpitations  Respiratory: Positive for cough  Negative for shortness of breath and sputum production  Musculoskeletal: Negative for falls  Gastrointestinal: Negative for bloating, nausea and vomiting  Neurological: Negative for dizziness and light-headedness  Psychiatric/Behavioral: Negative for altered mental status  All other systems reviewed and are negative      Historical Information   Past Medical History:   Diagnosis Date    Cancer [facility])     CHF (congestive heart failure) (HCC)     Chronic kidney disease     COPD [facility]chronic obstructive pulmonary disease) ([facility] )     Decubitus ulcer of heel     LAST ASSESSED [id number]    Diabetes mellitus ([facility] )     History of varicose veins     Hypertension     Intermittent claudication (HCC)     Neuropathy     Seasonal allergies      Past Surgical History:   Procedure Laterality Date    AMPUTATION ABOVE KNEE (AKA) Left 7/31/2019    Procedure: AMPUTATION ABOVE KNEE (AKA);   Surgeon: Katarina Padron MD;  Location:  MAIN OR;  Service: General    ANGIOPLASTY      W/ FLUOROSC ANGIOGRAPGY PERIPHERAL ARTERY ADDITIONAL  LAST ASSESSED [id number]    ANGIOPLASTY / STENTING FEMORAL      TANSCATH INTRAVASCULAR STENT PLACEMENT PERCUTANEOUS FEMORAL     COLONOSCOPY  2010    CT BONE MARROW BIOPSY AND ASPIRATION  8/9/2019    FULL THICKNESS SKIN GRAFT      TENDON REPAIR      TOE AMPUTATION Left 12/27/2018    Procedure: AMPUTATION left 4th TOE;  Surgeon: Urban Goode DPM;  Location:  MAIN OR;  Service: Podiatry     Social History     Substance and Sexual Activity   Alcohol Use Not Currently     Social History     Substance and Sexual Activity   Drug Use Not Currently     Social History     Tobacco Use   Smoking Status Never Smoker   Smokeless Tobacco Never Used     Family History:   Family History   Problem Relation Age of Onset    Other Mother         CARDIAC DISORDER     Diabetes Mother     Cancer Father     Other Family         CARDIAC DISORDER     Diabetes Family     Cancer Family     Mental illness Family     Kidney disease Sister     Diabetes Sister        Meds/Allergies   all current active meds have been reviewed and current meds:   Current Facility-Administered Medications   Medication Dose Route Frequency    acetaminophen (TYLENOL) tablet 650 mg  650 mg Oral Q6H PRN    acyclovir (ZOVIRAX) capsule 400 mg  400 mg Oral After Dinner    albuterol (PROVENTIL HFA,VENTOLIN HFA) inhaler 2 puff  2 puff Inhalation Q6H PRN    ascorbic acid (VITAMIN C) tablet 1,000 mg  1,000 mg Oral Q12H [facility]    atorvastatin (LIPITOR) tablet 40 mg  40 mg Oral After Dinner    bisacodyl (DULCOLAX) rectal suppository 10 mg  10 mg Rectal Daily PRN    ceftriaxone (ROCEPHIN) 1 g/50 mL in dextrose IVPB  1,000 mg Intravenous Q24H    cholecalciferol (VITAMIN D3) tablet 2,000 Units  2,000 Units Oral Daily    clotrimazole (LOTRIMIN) 1 % cream   Topical Q12H [address]    doxycycline hyclate (VIBRAMYCIN) capsule 100 mg  100 mg Oral Q12H    fluticasone-vilanterol (BREO ELLIPTA) 200-25 MCG/INH inhaler 1 puff  1 puff Inhalation Daily    insulin glargine (LANTUS) subcutaneous injection 42 Units 0 42 mL  42 Units Subcutaneous HS    insulin lispro (HumaLOG) 100 units/mL subcutaneous injection 1-5 Units  1-5 Units Subcutaneous HS    insulin lispro (HumaLOG) 100 units/mL subcutaneous injection 15 Units  15 Units Subcutaneous TID With Meals    insulin lispro (HumaLOG) 100 units/mL subcutaneous injection 2-12 Units  2-12 Units Subcutaneous TID AC    magnesium hydroxide (MILK OF MAGNESIA) 400 mg/5 mL oral suspension 30 mL  30 mL Oral Daily PRN    methylPREDNISolone sodium succinate (Solu-MEDROL) injection 125 mg  125 mg Intravenous Daily    metoprolol tartrate (LOPRESSOR) tablet 25 mg  25 mg Oral Q12H    moisture barrier miconazole 2% cream (aka KHADRA MOISTURE BARRIER ANTIFUNGAL CREAM)  1 application Topical TID    zinc sulfate (ZINCATE) capsule 220 mg  220 mg Oral Daily    Followed by   Destiny Benoit ON 7/2/2020] multivitamin-minerals (CENTRUM ADULTS) tablet 1 tablet  1 tablet Oral Daily    torsemide (DEMADEX) tablet 20 mg  20 mg Oral BID (diuretic)    warfarin (COUMADIN) tablet 7 5 mg  7 5 mg Oral Daily (warfarin)          Allergies   Allergen Reactions    Latex Rash       Objective   Vitals: Blood pressure 115/74, pulse 80, temperature 97 9 °F (36 6 °C), resp  rate 16, height 6' 3" (1 905 m), weight (!) 154 kg (339 lb 11 2 oz), SpO2 96 %  ,     Body mass index is 42 46 kg/m²  ,     Systolic (95BCJ), WPV:121 , Min:114 , YDK:053     Diastolic (24hrs), Av, Min:66, Max:74      Intake/Output Summary (Last 24 hours) at 2020 0835  Last data filed at 2020 0535  Gross per 24 hour   Intake 360 ml   Output 2200 ml   Net -1840 ml     Weight (last 2 days)     Date/Time   Weight    20 0600   (!) 154 (339 7)    20 0554   (!) 157 (346 2)    20 0600   (!) 154 (340)            Invasive Devices     Peripheral Intravenous Line            Peripheral IV 20 Right Forearm 1 day              Physical Exam  Patient no physically examined due to COVID 19 and conservation of PPE  Spoke with RN  LABORATORY RESULTS:  Results from last 7 days   Lab Units 20  0804 20  0505 20  0218  20  1207   CK TOTAL U/L  --   --   --   --  271   TROPONIN I ng/mL 5 19* 5 85* 6 35*   < > 0 99*   CK MB INDEX %  --   --   --   --  1 5    < > = values in this interval not displayed       CBC with diff:   Results from last 7 days   Lab Units 20  0540 20  0505 20  0950 20  2215 20  1207   WBC Thousand/uL 8 93 9 80 7 12 6 27 7 71   HEMOGLOBIN g/dL 10 9* 10 8* 11 4* 10 9* 11 7*   HEMATOCRIT % 36 7 35 5* 36 8 36 5 37 6   MCV fL 93 91 91 93 92   PLATELETS Thousands/uL 194 190 164 143* 158   MCH pg 27 7 27 6 28 1 27 8 28 5   MCHC g/dL 29 7* 30 4* 31 0* 29 9* 31 1*   RDW % 15 5* 15 5* 15 7* 15 9* 16 0*   MPV fL 11 1 11 4 11 2 11 3 11 5   NRBC AUTO /100 WBCs 1  --  0  --  0       CMP:  Results from last 7 days   Lab Units 20  0540 20  0505 20  0950 20  2215 20  1207   POTASSIUM mmol/L 4 3 4 5 5 1 4 6 3 9   CHLORIDE mmol/L 106 104 102 101 101   CO2 mmol/L 23 25 25 25 22   BUN mg/dL 73* 65* 51* 41* 33*   CREATININE mg/dL 1 43* 1 62* 1 86* 1 73* 1 47*   CALCIUM mg/dL 8 2* 8 6 9 0 8 1* 8 0*   AST U/L  --   --  61* 47* 30   ALT U/L  --   --  28 27 27   ALK PHOS U/L  --   --  150* 157* 163*   EGFR ml/min/1 73sq m 50 43 37 40 49       BMP:  Results from last 7 days   Lab Units 20  0540 20  0505 20  0950 20  2215 20  1207   POTASSIUM mmol/L 4 3 4 5 5 1 4 6 3 9   CHLORIDE mmol/L 106 104 102 101 101   CO2 mmol/L 23 25 25 25 22   BUN mg/dL 73* 65* 51* 41* 33*   CREATININE mg/dL 1 43* 1 62* 1 86* 1 73* 1 47*   CALCIUM mg/dL 8 2* 8 6 9 0 8 1* 8 0*          Lab Results   Component Value Date    NTBNP 4,293 (H) 2020    NTBNP 2,827 (H) 10/29/2019    NTBNP 1,074 (H) 2018     Results from last 7 days   Lab Units 20  1207   MAGNESIUM mg/dL 1 7     Results from last 7 days   Lab Units 20  1207 20  0650 20   HEMOGLOBIN A1C % 8 8* 9 3* 9 3              Results from last 7 days   Lab Units 20  0540 20  0505 20  0950 20  2215 20  1207   INR  2 37* 1 90* 1 61* 1 48* 1 63*     Lipid Profile:   Lab Results   Component Value Date    CHOL 147 2017    CHOL 155 2016    CHOL 165 02/15/2016     Lab Results   Component Value Date    HDL 37 (L) 2019    HDL 38 (L) 2018    HDL 37 (L) 2017     Lab Results   Component Value Date    LDLCALC 48 2019    LDLCALC 54 2018    LDLCALC 61 2015     Lab Results   Component Value Date    TRIG 111 2020    TRIG 95 2019    TRIG 141 2018         Cardiac testing:   Results for orders placed during the hospital encounter of 19   Echo complete with contrast if indicated    Narrative [address] [place] [address]    Transthoracic Echocardiogram  2D, M-mode, Doppler, and Color Doppler    Study date:  2019    Patient: Joshua Quezada  MR number: [id number]  Account number: [de-identified]  : 1950  Age: 79 years  Gender: Male  Status: Outpatient  Location: [facility]  Height: 75 in  Weight: 368 lb  BP: 110/ 60 mmHg    Indications: Shortness of breath  Diagnoses: R06 02 - Shortness of breath    Sonographer:  Claudetta Furnish BS, RCS  Primary Physician:   Maye Bravo, MD  Referring Physician:  Maral Goodwin MD  Group:  [id number]  Interpreting Physician:  Radha Varela MD    SUMMARY    LEFT VENTRICLE:  Systolic function was normal  Ejection fraction was estimated to be 60 %  There were no regional wall motion abnormalities  There was mild concentric hypertrophy  Doppler parameters were consistent with a reversible restrictive pattern, indicative of decreased left ventricular diastolic compliance and/or increased left atrial pressure (grade 3 diastolic dysfunction)  RIGHT VENTRICLE:  The ventricle was mildly dilated  Systolic function was normal     LEFT ATRIUM:  The atrium was mildly dilated  RIGHT ATRIUM:  The atrium was mildly dilated  MITRAL VALVE:  There was mild annular calcification  There was trace regurgitation  HISTORY: PRIOR HISTORY: Atrial fibrillation, CHF, Hypertension, COPD, High cholesterol  PROCEDURE: The study was performed in the [facility]  This was a routine study  The transthoracic approach was used  The study included complete 2D imaging, M-mode, complete spectral Doppler, and color Doppler  Images were  obtained from the parasternal, apical, subcostal, and suprasternal notch acoustic windows  Intravenous contrast (Definity solution [1 3 ml Definity/8 7ml normal saline solution]) was administered  Intravenous contrast was administered to  opacify the left ventricle  Intravenous contrast (  8 ml Definity in NSS) was administered  Echocardiographic views were limited due to restricted patient mobility, decreased penetration, and lung interference  This was a technically  difficult study  LEFT VENTRICLE: Size was normal  Systolic function was normal  Ejection fraction was estimated to be 60 %  There were no regional wall motion abnormalities  Wall thickness was mildly increased  There was mild concentric hypertrophy  DOPPLER: Transmitral flow pattern: atrial fibrillation   Doppler parameters were consistent with a reversible restrictive pattern, indicative of decreased left ventricular diastolic compliance and/or increased left atrial pressure (grade 3  diastolic dysfunction)  Left ventricular diastolic function parameters were abnormal     RIGHT VENTRICLE: The ventricle was mildly dilated  Systolic function was normal  Wall thickness was normal     LEFT ATRIUM: The atrium was mildly dilated  RIGHT ATRIUM: The atrium was mildly dilated  MITRAL VALVE: There was mild annular calcification  Valve structure was normal  There was normal leaflet separation  DOPPLER: The transmitral velocity was within the normal range  There was no evidence for stenosis  There was trace  regurgitation  AORTIC VALVE: The valve was trileaflet  Leaflets exhibited normal thickness, normal cuspal separation, and sclerosis  DOPPLER: Transaortic velocity was within the normal range  There was no evidence for stenosis  There was no significant  regurgitation  TRICUSPID VALVE: Not well visualized  There was normal leaflet separation  DOPPLER: The transtricuspid velocity was within the normal range  There was no evidence for stenosis  There was no significant regurgitation  PULMONIC VALVE: Not well visualized  DOPPLER: The transpulmonic velocity was within the normal range  There was no significant regurgitation  PERICARDIUM: There was no pericardial effusion  The pericardium was normal in appearance  AORTA: The root exhibited normal size  SYSTEMIC VEINS: IVC: The inferior vena cava was normal in size  PULMONARY VEINS: DOPPLER: Doppler signals were not recordable in the pulmonary vein(s)      SYSTEM MEASUREMENT TABLES    2D  %FS: 38 66 %  Ao Diam: 4 17 cm  EDV(Teich): 164 53 ml  EF(Teich): 68 25 %  ESV(Teich): 52 23 ml  IVSd: 1 33 cm  LA Area: 30 96 cm2  LA Diam: 5 13 cm  LVEDV MOD A4C: 138 47 ml  LVEF MOD A4C: 69 46 %  LVESV MOD A4C: 42 3 ml  LVIDd: 5 77 cm  LVIDs: 3 54 cm  LVLd A4C: 8 57 cm  LVLs A4C: 6 97 cm  LVPWd: 1 35 cm  RA Area: 25 2 cm2  RVIDd: 4 47 cm  SV MOD A4C: 96 18 ml  SV(Teich): 112 3 ml    [facility] Accredited Echocardiography Laboratory    Prepared and electronically signed by    Michael Cochran MD  Signed 04-Apr-2019 14:05:02       Imaging:   Xr Chest 1 View Portable    Result Date: 6/25/2020  Narrative: CHEST INDICATION:   rule out covid  Patient has suspected COVID-19  COMPARISON:  December 4, 2019 EXAM PERFORMED/VIEWS:  XR CHEST PORTABLE FINDINGS: Heart shadow is obscured by adjacent opacity  Left retrocardiac consolidation and patchy right lower lobe consolidation consistent with pneumonia and/or atelectasis  Osseous structures appear within normal limits for patient age  Impression: Bilateral consolidations concerning for pneumonia In the setting of clinically suspected/proven COVID-19, this plain film appearance while nonspecific, can be seen in cases of viral pneumonia such as COVID-19  The study was marked in [facility] for immediate notification  Workstation performed: [id number]     Thank you for allowing us to participate in this patient's care  This pt will follow up with Dr Antoinette Neal once discharged  Counseling / Coordination of Care  Total floor / unit time spent today 45 minutes  Greater than 50% of total time was spent with the patient and / or family counseling and / or coordination of care  A description of the counseling / coordination of care: Review of history, current assessment, development of a plan  Code Status: Level 1 - Full Code    ** Please Note: Dragon 360 Dictation voice to text software may have been used in the creation of this document   **

## 2020-06-28 NOTE — ASSESSMENT & PLAN NOTE
· Acute kidney failure likely due to Sepsis evidenced by Cr 1 73 (baseline Cr 1 2)  · Continues to improve  · No IVF due to his history of CHF  · Avoid nephrotoxins, hypotension    · Monitor BMP

## 2020-06-28 NOTE — ASSESSMENT & PLAN NOTE
· Pt presents with 1 week symptoms at [facility]  · Currently receiving COVID moderate treatment algorithm  · Continue ceftriaxone and doxycycline, given elevated procalcitonin, continue to trend  · Continue vitamin-C, vitamin-D, zinc supplementation  · Monitor temps, WBC, procalcitonin, inflammatory markers  · Awaiting L 6

## 2020-06-28 NOTE — ASSESSMENT & PLAN NOTE
· Likely due to acute respiratory failure from COVID-19 pneumonia  · Patient denies any chest pain or shortness of breath    · Will continue warfarin for anticoagulation given his lack of symptoms at this time  · Outpatient cardiac evaluation when stable

## 2020-06-28 NOTE — ASSESSMENT & PLAN NOTE
Wt Readings from Last 3 Encounters:   06/28/20 (!) 154 kg (339 lb 11 2 oz)   06/15/20 (!) 155 kg (341 lb 4 4 oz)   06/08/20 (!) 154 kg (340 lb 9 8 oz)     · Volume status is adequate  · Monitor intake and output  · Monitor daily weights  · Continue home dose of torsemide for now

## 2020-06-28 NOTE — PROGRESS NOTES
Progress Note - Reji Height 1950, 81 y o  male MRN: [id number]    Unit/Bed#: [facility] 821-01 Encounter: [id number]    Primary Care Provider: Po Castaneda MD   Date and time admitted to hospital: 6/25/2020 11:43 AM        * Acute respiratory failure with hypoxia due to Pneumonia due to COVID-19 virus  Assessment & Plan  · Pt presents with 1 week symptoms at [facility]  · Currently receiving COVID moderate treatment algorithm  · Continue ceftriaxone and doxycycline, given elevated procalcitonin, continue to trend  · Continue vitamin-C, vitamin-D, zinc supplementation  · Monitor temps, WBC, procalcitonin, inflammatory markers  · Awaiting L 6    CKD (chronic kidney disease)  Assessment & Plan  · Baseline creatinine around 1 2   · Management as above    Chronic obstructive pulmonary disease, unspecified ([facility] [address] )  Assessment & Plan  · Stable without any wheezing  · Continue home inhalers  Type 2 acute myocardial infarction [facility])  Assessment & Plan  · Likely due to acute respiratory failure from COVID-19 pneumonia  · Patient denies any chest pain or shortness of breath  · Will continue warfarin for anticoagulation given his lack of symptoms at this time  · Outpatient cardiac evaluation when stable    Acute renal failure (ARF) (HCC)  Assessment & Plan  · Acute kidney failure likely due to Sepsis evidenced by Cr 1 73 (baseline Cr 1 2)  · Continues to improve  · No IVF due to his history of CHF  · Avoid nephrotoxins, hypotension  · Monitor BMP    Chronic diastolic congestive heart failure [facility])  Assessment & Plan  Wt Readings from Last 3 Encounters:   06/28/20 (!) 154 kg (339 lb 11 2 oz)   06/15/20 (!) 155 kg (341 lb 4 4 oz)   06/08/20 (!) 154 kg (340 lb 9 8 oz)     · Volume status is adequate  · Monitor intake and output  · Monitor daily weights  · Continue home dose of torsemide for now      Chronic atrial fibrillation (HCC)  Assessment & Plan  · Rate controlled with metoprololCont lopressor  · Warfarin for anticoagulation  · Monitor INR daily, currently therapeutic    Essential hypertension  Assessment & Plan  · Well controlled  · Continue metoprolol and torsemide  Diabetes mellitus type 2, uncontrolled ([facility] [address] )  Assessment & Plan  Lab Results   Component Value Date    HGBA1C 8 8 (H) 2020       Recent Labs     20  2116 20  0802 20  1100 20  1618   POCGLU 262* 226* 207* 209*       Blood Sugar Average: Last 72 hrs:  (P) 898 [id number]   · Uncontrolled due to IV steroid use  · Improved today  · Diabetic diet  · Titrate up Lantus today  · Titrate up Humalog today  · Continue sliding scale coverage        VTE Pharmacologic Prophylaxis:   Pharmacologic: Warfarin (Coumadin)  Mechanical VTE Prophylaxis in Place: Yes    Patient Centered Rounds: I have performed bedside rounds with nursing staff today  Education and Discussions with Family / Patient:  Patient, plan of care    Time Spent for Care: 20 minutes  More than 50% of total time spent on counseling and coordination of care as described above  Current Length of Stay: 3 day(s)    Current Patient Status: Inpatient   Certification Statement: The patient will continue to require additional inpatient hospital stay due to Coronavirus treatment    Discharge Plan:  [facility] when stable    Code Status: Level 1 - Full Code      Subjective:   Denies any acute complaints today  Tolerating diet  Objective:     Vitals:   Temp (24hrs), Av 2 °F (36 8 °C), Min:97 9 °F (36 6 °C), Max:98 4 °F (36 9 °C)    Temp:  [97 9 °F (36 6 °C)-98 4 °F (36 9 °C)] 98 4 °F (36 9 °C)  HR:  [74-85] 74  Resp:  [16-18] 18  BP: (115-118)/(73-74) 118/73  SpO2:  [94 %-96 %] 95 %  Body mass index is 42 46 kg/m²  Input and Output Summary (last 24 hours):        Intake/Output Summary (Last 24 hours) at 2020 1700  Last data filed at 2020 1624  Gross per 24 hour   Intake 600 ml   Output 2450 ml   Net -1850 ml       Physical Exam: Physical Exam   Constitutional: He is oriented to person, place, and time  Morbidly obese middle-aged male, sitting in bed   HENT:   Head: Normocephalic and atraumatic  Eyes: Pupils are equal, round, and reactive to light  EOM are normal    Neck: Neck supple  Cardiovascular: Normal rate and regular rhythm  Pulmonary/Chest: Effort normal  He has no wheezes  He has no rales  Abdominal: Soft  Musculoskeletal: He exhibits no edema  Left AKA  2 2x1cm skin tears   Neurological: He is alert and oriented to person, place, and time  Skin: Skin is warm and dry  Additional Data:     Labs:    Results from last 7 days   Lab Units 06/28/20  0540   WBC Thousand/uL 8 93   HEMOGLOBIN g/dL 10 9*   HEMATOCRIT % 36 7   PLATELETS Thousands/uL 194   NEUTROS PCT % 92*   LYMPHS PCT % 3*   MONOS PCT % 4   EOS PCT % 0     Results from last 7 days   Lab Units 06/28/20  0540  06/26/20  0950   SODIUM mmol/L 137   < > 136   POTASSIUM mmol/L 4 3   < > 5 1   CHLORIDE mmol/L 106   < > 102   CO2 mmol/L 23   < > 25   BUN mg/dL 73*   < > 51*   CREATININE mg/dL 1 43*   < > 1 86*   ANION GAP mmol/L 8   < > 9   CALCIUM mg/dL 8 2*   < > 9 0   ALBUMIN g/dL  --   --  2 8*   TOTAL BILIRUBIN mg/dL  --   --  0 76   ALK PHOS U/L  --   --  150*   ALT U/L  --   --  28   AST U/L  --   --  61*   GLUCOSE RANDOM mg/dL 189*   < > 448*    < > = values in this interval not displayed       Results from last 7 days   Lab Units 06/28/20  0540   INR  2 37*     Results from last 7 days   Lab Units 06/28/20  1618 06/28/20  1100 06/28/20  0802 06/27/20  2116 06/27/20  1617 06/27/20  1102 06/27/20  0752 06/26/20  2100 06/26/20  1623 06/26/20  1108 06/26/20  0738 06/26/20  0200   POC GLUCOSE mg/dl 209* 207* 226* 262* 298* 427* 285* 333* 371* 496* 384* 371*     Results from last 7 days   Lab Units 06/25/20  1207 06/25/20  0650 06/25/20   HEMOGLOBIN A1C % 8 8* 9 3* 9 3     Results from last 7 days   Lab Units 06/28/20  0539 06/27/20  0505 06/26/20  1454 06/26/20  0950 06/25/20  2302 06/25/20  1718 06/25/20  1717 06/25/20  1207   LACTIC ACID mmol/L  --   --   --   --  1 4 1 6  --  2 1*   PROCALCITONIN ng/ml 1 97* 3 80* 3 59* 4 42*  --   --  2 77* 1 42*           * I Have Reviewed All Lab Data Listed Above  * Additional Pertinent Lab Tests Reviewed: All Labs Within Last 24 Hours Reviewed      Recent Cultures (last 7 days):     Results from last 7 days   Lab Units 06/25/20  1206   BLOOD CULTURE  No Growth at 48 hrs  No Growth at 48 hrs         Last 24 Hours Medication List:     Current Facility-Administered Medications:  acetaminophen 650 mg Oral Q6H PRN Jennifer Mckinney MD    acyclovir 400 mg Oral After Caitlyn Solis MD    albuterol 2 puff Inhalation Q6H PRN Jennifer Mckinney MD    ascorbic acid 1,000 mg Oral Q12H [address] Jennifer Mckinney MD    atorvastatin 40 mg Oral After Caitlyn Solis MD    bisacodyl 10 mg Rectal Daily PRN Jennifer Mckinney MD    cefTRIAXone 1,000 mg Intravenous Q24H Jennifer Mckinney MD Last Rate: 1,000 mg (06/28/20 1528)   cholecalciferol 2,000 Units Oral Daily Jennifer Mckinney MD    clotrimazole  Topical Q12H Vipin Morales MD    doxycycline hyclate 100 mg Oral Q12H Jennifer Mckinney MD    fluticasone-vilanterol 1 puff Inhalation Daily Jennifer Mckinney MD    insulin glargine 42 Units Subcutaneous HS Elsa Wilson MD    insulin lispro 1-5 Units Subcutaneous HS José Miguel Sanon PA-C    insulin lispro 15 Units Subcutaneous TID With Meals Elsa Wilson MD    insulin lispro 2-12 Units Subcutaneous TID AC Jennifer Mckinney MD    magnesium hydroxide 30 mL Oral Daily PRN Jennifer Mckinney MD    methylPREDNISolone sodium succinate 125 mg Intravenous Daily Jennifer Mckinney MD    metoprolol tartrate 25 mg Oral Q12H Jennifer Mckinney MD    Aakash Abo ANTIFUNGAL 1 application Topical TID Elsa Wilson MD    zinc sulfate 220 mg Oral Daily Jennifer Mckinney MD    Followed by        Rashmi Powers ON 7/2/2020] multivitamin-minerals 1 tablet Oral Daily Nikki Encarnacion MD    torsemide 20 mg Oral BID (diuretic) NICOLA Roy Do    warfarin 7 5 mg Oral Daily (warfarin) NICOLA Roy Do         Today, Patient Was Seen By: Yocasta Corona MD    ** Please Note: Dictation voice to text software may have been used in the creation of this document   **

## 2020-06-28 NOTE — ASSESSMENT & PLAN NOTE
Lab Results   Component Value Date    HGBA1C 8 8 (H) 06/25/2020       Recent Labs     06/27/20  2116 06/28/20  0802 06/28/20  1100 06/28/20  1618   POCGLU 262* 226* 207* 209*       Blood Sugar Average: Last 72 hrs:  (P) 841 [id number]   · Uncontrolled due to IV steroid use  · Improved today  · Diabetic diet  · Titrate up Lantus today  · Titrate up Humalog today  · Continue sliding scale coverage

## 2020-06-28 NOTE — ASSESSMENT & PLAN NOTE
· Rate controlled with metoprololCont lopressor  · Warfarin for anticoagulation  · Monitor INR daily, currently therapeutic

## 2020-06-29 ENCOUNTER — HOSPITAL ENCOUNTER (OUTPATIENT)
Dept: INFUSION CENTER | Facility: HOSPITAL | Age: 70
Discharge: HOME | End: 2020-06-29
Attending: INTERNAL MEDICINE

## 2020-06-29 LAB
ANION GAP SERPL CALCULATED.3IONS-SCNC: 9 MMOL/L (ref 4–13)
BASOPHILS # BLD AUTO: 0.01 THOUSANDS/ΜL (ref 0–0.1)
BASOPHILS NFR BLD AUTO: 0 % (ref 0–1)
BUN SERPL-MCNC: 74 MG/DL (ref 5–25)
CALCIUM SERPL-MCNC: 9.1 MG/DL (ref 8.3–10.1)
CHLORIDE SERPL-SCNC: 105 MMOL/L (ref 100–108)
CO2 SERPL-SCNC: 29 MMOL/L (ref 21–32)
CREAT SERPL-MCNC: 1.54 MG/DL (ref 0.6–1.3)
CRP SERPL QL: 85.3 MG/L
D DIMER PPP FEU-MCNC: 0.48 UG/ML FEU
EOSINOPHIL # BLD AUTO: 0 THOUSAND/ΜL (ref 0–0.61)
EOSINOPHIL NFR BLD AUTO: 0 % (ref 0–6)
ERYTHROCYTE [DISTWIDTH] IN BLOOD BY AUTOMATED COUNT: 15.6 % (ref 11.6–15.1)
FERRITIN SERPL-MCNC: 1768 NG/ML (ref 8–388)
GFR SERPL CREATININE-BSD FRML MDRD: 46 ML/MIN/1.73SQ M
GLUCOSE SERPL-MCNC: 110 MG/DL (ref 65–140)
GLUCOSE SERPL-MCNC: 117 MG/DL (ref 65–140)
GLUCOSE SERPL-MCNC: 162 MG/DL (ref 65–140)
GLUCOSE SERPL-MCNC: 247 MG/DL (ref 65–140)
GLUCOSE SERPL-MCNC: 69 MG/DL (ref 65–140)
HCT VFR BLD AUTO: 37.3 % (ref 36.5–49.3)
HGB BLD-MCNC: 11.3 G/DL (ref 12–17)
IMM GRANULOCYTES # BLD AUTO: 0.04 THOUSAND/UL (ref 0–0.2)
IMM GRANULOCYTES NFR BLD AUTO: 1 % (ref 0–2)
INR PPP: 2.74 (ref 0.84–1.19)
LYMPHOCYTES # BLD AUTO: 0.3 THOUSANDS/ΜL (ref 0.6–4.47)
LYMPHOCYTES NFR BLD AUTO: 5 % (ref 14–44)
MCH RBC QN AUTO: 27.8 PG (ref 26.8–34.3)
MCHC RBC AUTO-ENTMCNC: 30.3 G/DL (ref 31.4–37.4)
MCV RBC AUTO: 92 FL (ref 82–98)
MONOCYTES # BLD AUTO: 0.2 THOUSAND/ΜL (ref 0.17–1.22)
MONOCYTES NFR BLD AUTO: 3 % (ref 4–12)
NEUTROPHILS # BLD AUTO: 6.02 THOUSANDS/ΜL (ref 1.85–7.62)
NEUTS SEG NFR BLD AUTO: 91 % (ref 43–75)
NRBC BLD AUTO-RTO: 0 /100 WBCS
PLATELET # BLD AUTO: 209 THOUSANDS/UL (ref 149–390)
PMV BLD AUTO: 11.3 FL (ref 8.9–12.7)
POTASSIUM SERPL-SCNC: 3.8 MMOL/L (ref 3.5–5.3)
PROCALCITONIN SERPL-MCNC: 0.69 NG/ML
PROTHROMBIN TIME: 28.9 SECONDS (ref 11.6–14.5)
RBC # BLD AUTO: 4.06 MILLION/UL (ref 3.88–5.62)
SODIUM SERPL-SCNC: 143 MMOL/L (ref 136–145)
WBC # BLD AUTO: 6.57 THOUSAND/UL (ref 4.31–10.16)

## 2020-06-29 PROCEDURE — 99232 SBSQ HOSP IP/OBS MODERATE 35: CPT | Performed by: INTERNAL MEDICINE

## 2020-06-29 PROCEDURE — 82728 ASSAY OF FERRITIN: CPT | Performed by: INTERNAL MEDICINE

## 2020-06-29 PROCEDURE — 85379 FIBRIN DEGRADATION QUANT: CPT | Performed by: INTERNAL MEDICINE

## 2020-06-29 PROCEDURE — 99222 1ST HOSP IP/OBS MODERATE 55: CPT | Performed by: NURSE PRACTITIONER

## 2020-06-29 PROCEDURE — 85025 COMPLETE CBC W/AUTO DIFF WBC: CPT | Performed by: INTERNAL MEDICINE

## 2020-06-29 PROCEDURE — 80048 BASIC METABOLIC PNL TOTAL CA: CPT | Performed by: INTERNAL MEDICINE

## 2020-06-29 PROCEDURE — 84145 PROCALCITONIN (PCT): CPT | Performed by: INTERNAL MEDICINE

## 2020-06-29 PROCEDURE — 97167 OT EVAL HIGH COMPLEX 60 MIN: CPT

## 2020-06-29 PROCEDURE — 85610 PROTHROMBIN TIME: CPT | Performed by: NURSE PRACTITIONER

## 2020-06-29 PROCEDURE — 82948 REAGENT STRIP/BLOOD GLUCOSE: CPT

## 2020-06-29 PROCEDURE — 97163 PT EVAL HIGH COMPLEX 45 MIN: CPT

## 2020-06-29 PROCEDURE — 86140 C-REACTIVE PROTEIN: CPT | Performed by: INTERNAL MEDICINE

## 2020-06-29 RX ORDER — INSULIN GLARGINE 100 [IU]/ML
35 INJECTION, SOLUTION SUBCUTANEOUS
Status: DISCONTINUED | OUTPATIENT
Start: 2020-06-29 | End: 2020-07-07

## 2020-06-29 RX ORDER — TORSEMIDE 20 MG/1
20 TABLET ORAL
Status: DISCONTINUED | OUTPATIENT
Start: 2020-07-01 | End: 2020-07-14 | Stop reason: HOSPADM

## 2020-06-29 RX ORDER — WARFARIN SODIUM 5 MG/1
5 TABLET ORAL
Status: DISCONTINUED | OUTPATIENT
Start: 2020-06-29 | End: 2020-07-07

## 2020-06-29 RX ORDER — LANOLIN ALCOHOL/MO/W.PET/CERES
6 CREAM (GRAM) TOPICAL
Status: DISCONTINUED | OUTPATIENT
Start: 2020-06-29 | End: 2020-07-14 | Stop reason: HOSPADM

## 2020-06-29 RX ADMIN — MICONAZOLE NITRATE 1 APPLICATION: 20 CREAM TOPICAL at 22:02

## 2020-06-29 RX ADMIN — MELATONIN 2000 UNITS: at 08:10

## 2020-06-29 RX ADMIN — ZINC SULFATE 220 MG (50 MG) CAPSULE 220 MG: CAPSULE at 08:10

## 2020-06-29 RX ADMIN — CLOTRIMAZOLE: 10 CREAM TOPICAL at 22:03

## 2020-06-29 RX ADMIN — MICONAZOLE NITRATE 1 APPLICATION: 20 CREAM TOPICAL at 08:12

## 2020-06-29 RX ADMIN — ACYCLOVIR 400 MG: 200 CAPSULE ORAL at 17:56

## 2020-06-29 RX ADMIN — OXYCODONE HYDROCHLORIDE AND ACETAMINOPHEN 1000 MG: 500 TABLET ORAL at 08:10

## 2020-06-29 RX ADMIN — INSULIN LISPRO 2 UNITS: 100 INJECTION, SOLUTION INTRAVENOUS; SUBCUTANEOUS at 22:01

## 2020-06-29 RX ADMIN — METOPROLOL TARTRATE 25 MG: 25 TABLET, FILM COATED ORAL at 22:01

## 2020-06-29 RX ADMIN — METHYLPREDNISOLONE SODIUM SUCCINATE 125 MG: 125 INJECTION, POWDER, FOR SOLUTION INTRAMUSCULAR; INTRAVENOUS at 08:10

## 2020-06-29 RX ADMIN — METOPROLOL TARTRATE 25 MG: 25 TABLET, FILM COATED ORAL at 08:10

## 2020-06-29 RX ADMIN — CEFTRIAXONE SODIUM 1000 MG: 1 INJECTION, POWDER, FOR SOLUTION INTRAMUSCULAR; INTRAVENOUS at 14:49

## 2020-06-29 RX ADMIN — INSULIN LISPRO 2 UNITS: 100 INJECTION, SOLUTION INTRAVENOUS; SUBCUTANEOUS at 17:55

## 2020-06-29 RX ADMIN — WARFARIN SODIUM 5 MG: 5 TABLET ORAL at 17:55

## 2020-06-29 RX ADMIN — TORSEMIDE 20 MG: 20 TABLET ORAL at 17:55

## 2020-06-29 RX ADMIN — MELATONIN 6 MG: at 22:00

## 2020-06-29 RX ADMIN — MICONAZOLE NITRATE 1 APPLICATION: 20 CREAM TOPICAL at 17:56

## 2020-06-29 RX ADMIN — FLUTICASONE FUROATE AND VILANTEROL TRIFENATATE 1 PUFF: 200; 25 POWDER RESPIRATORY (INHALATION) at 08:12

## 2020-06-29 RX ADMIN — DOXYCYCLINE 100 MG: 100 CAPSULE ORAL at 22:04

## 2020-06-29 RX ADMIN — CLOTRIMAZOLE: 10 CREAM TOPICAL at 08:11

## 2020-06-29 RX ADMIN — DOXYCYCLINE 100 MG: 100 CAPSULE ORAL at 14:49

## 2020-06-29 RX ADMIN — ACETAMINOPHEN 650 MG: 325 TABLET ORAL at 08:10

## 2020-06-29 RX ADMIN — ATORVASTATIN CALCIUM 40 MG: 40 TABLET, FILM COATED ORAL at 17:55

## 2020-06-29 RX ADMIN — TORSEMIDE 20 MG: 20 TABLET ORAL at 08:10

## 2020-06-29 RX ADMIN — OXYCODONE HYDROCHLORIDE AND ACETAMINOPHEN 1000 MG: 500 TABLET ORAL at 22:00

## 2020-06-29 RX ADMIN — INSULIN GLARGINE 35 UNITS: 100 INJECTION, SOLUTION SUBCUTANEOUS at 22:00

## 2020-06-29 NOTE — PLAN OF CARE
Problem: OCCUPATIONAL THERAPY ADULT  Goal: Performs self-care activities at highest level of function for planned discharge setting  See evaluation for individualized goals  Description  Treatment Interventions: ADL retraining, Functional transfer training, UE strengthening/ROM, Endurance training, Cognitive reorientation, Patient/family training, Equipment evaluation/education, Compensatory technique education, Activityengagement, Energy conservation          See flowsheet documentation for full assessment, interventions and recommendations  Note:   Limitation: Decreased ADL status, Decreased endurance, Decreased cognition, Decreased Safe judgement during ADL, Decreased self-care trans, Decreased high-level ADLs  Prognosis: Fair  Assessment: Pt is a 81 y o  male who was admitted to [facility] on 6/25/2020 with cough, SOB Pneumonia due to COVID-19 virus ARF with hypoxia  Pt's problem list also includes PMH of diabetic foot ulcer, morbid obesity, GERD, hyperlipidemia, cardiomyopathy, gout, mass of psoas muscle, AKA left lower  At baseline pt was completing assistance with ADL's/IADL's, +RW with transfers to w/c, w/c mobility  Pt lives at [facility] Currently pt requires min a UB, max a LB for overall ADLS and min a x 1 for sit pivot transfers from bed>drop arm chair for transfers  Pt currently presents with impairments in the following categories -difficulty performing ADLS, difficulty performing IADLS , limited insight into deficits, compliance, flat affect, decreased initiation and engagement  and health management  activity tolerance, endurance, standing balance/tolerance, sitting balance/tolerance, memory, insight, safety , judgement , attention , sequencing  and communication   These impairments, as well as pt's fatigue, SOB and risk for falls  limit pt's ability to safely engage in all baseline areas of occupation, includingbathing, dressing, toileting, functional mobility/transfers, community mobility and leisure activities  From OT standpoint, recommend return to facility with appropriate level of care upon D/C  OT will continue to follow to address the below stated goals  OT Discharge Recommendation: Return to previous environment with social support  OT - OK to Discharge:  Yes

## 2020-06-29 NOTE — PLAN OF CARE
Problem: PHYSICAL THERAPY ADULT  Goal: Performs mobility at highest level of function for planned discharge setting  See evaluation for individualized goals  Description  Treatment/Interventions: Functional transfer training, LE strengthening/ROM, Therapeutic exercise, Endurance training, Cognitive reorientation, Patient/family training, Equipment eval/education, Bed mobility          See flowsheet documentation for full assessment, interventions and recommendations  Note:   Prognosis: Fair  Problem List: Decreased strength, Decreased endurance, Impaired balance, Decreased mobility, Decreased cognition, Decreased safety awareness, Obesity  Assessment: Pt is a 81 y o  male seen for PT evaluation s/p admit to [facility] on 6/25/2020  Pt was admitted with a primary dx of: acute respiratory failure, PNA due to COVID-19 virus  PT now consulted for assessment of mobility and d/c needs  Pt with Activity as tolerated orders  Pts current comorbidities effecting treatment include: MM, COPD, CHF, L AKA, Afib, HTN, DM  Pts current clinical presentation is Unstable/ Unpredictable (high complexity) due to Ongoing medical management for primary dx, Decreased activity tolerance compared to baseline, Fall risk, Cog status, Increased O2 via NC from pts baseline  Prior to admission, pt was resident at [facility], has assistance with all ADL's  Pt is an inconsistent historian, reports independent transfers to W/C with RW at [facility]  Upon evaluation, pt currently is requiring supervision for bed mobility; Kinga for pressover transfer with drop arm chair  Pt presents at PT eval functioning below baseline and currently w/ overall mobility deficits 2* to: BLE weakness, impaired balance, decreased endurance, decreased activity tolerance compared to baseline, decreased functional mobility tolerance compared to baseline, decreased safety awareness, fall risk, decreased cognition   Pt currently at a fall risk 2* to impairments listed above   Pt will continue to benefit from skilled acute PT interventions to address stated impairments; to maximize functional mobility; for ongoing pt/ family training; and DME needs  At conclusion of PT session pt returned back in chair and chair alarm engaged with phone and call bell within reach  Pt denies any further questions at this time  Recommend return to [facility] upon hospital D/C  PT Discharge Recommendation: Return to previous environment with no needs(back to [facility])     PT - OK to Discharge: Yes    See flowsheet documentation for full assessment

## 2020-06-29 NOTE — PROGRESS NOTES
Pt was found laying on floor, pt stated he slipped and floor was noted to be wet  Pt did not complain of any pain and stated multiple times he did not hit anything when he slipped  Pts leg was previsouly swollen and now was more open then prior  Per pt's son pt has over the last few months pt had periods of not being himself  Pt has been up most of the night sitting at the edge of the bed

## 2020-06-29 NOTE — PHYSICAL THERAPY NOTE
Physical Therapy Evaluation    Patient's Name: Delroy Rojas    Admitting Diagnosis  SOB (shortness of breath) [R06 02]  Hypoxia [R09 02]  Sepsis ([place] [address] ) [A41 9]  COVID-19 virus infection [U07 1]    Problem List  Patient Active Problem List   Diagnosis    Diabetic foot ulcer ([place] [address] )    Diabetes mellitus type 2, uncontrolled ([place] [address] )    Chronic venous hypertension, right    Essential hypertension    Chronic atrial fibrillation (HCC)    Morbid obesity (HCC)    Chronic diastolic congestive heart failure (HCC)    Cardiomyopathy (HCC)    Diabetes, polyneuropathy ([place] [address] )    GERD (gastroesophageal reflux disease)    Hyperlipidemia    Onychomycosis    Gallstones    Localized edema    Peripheral vascular disease (HCC)    SOB (shortness of breath)    NALLELY (obstructive sleep apnea)    Moderate persistent asthma without complication    Acute renal failure (ARF) (HCC)    Multiple myeloma (HCC)    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)    Hiatal hernia    Weakness    Type 2 acute myocardial infarction ([facility])    Chronic obstructive pulmonary disease, unspecified ([place] [address] )    Hypertensive chronic kidney disease w stg 1-4/unsp chr kdny    Mass of psoas muscle    CKD (chronic kidney disease)    Chronic diastolic (congestive) heart failure (HCC)    Lymphedema    Rash    Difficulty in walking, not elsewhere classified    Gout    Venous insufficiency (chronic) (peripheral)    Acute respiratory failure with hypoxia due to Pneumonia due to COVID-19 virus    Sepsis due to COVID-19 pneumonia [facility])       Past Medical History  Past Medical History:   Diagnosis Date    Cancer [facility])     CHF (congestive heart failure) (HCC)     Chronic kidney disease     COPD (chronic obstructive pulmonary disease) ([facility])     Decubitus ulcer of heel     LAST ASSESSED [id number]    Diabetes mellitus ([place] [address] )     History of varicose veins     Hypertension     Intermittent claudication (HCC)     Neuropathy     Seasonal allergies        Past Surgical History  Past Surgical History:   Procedure Laterality Date    AMPUTATION ABOVE KNEE (AKA) Left 7/31/2019    Procedure: AMPUTATION ABOVE KNEE (AKA); Surgeon: Lori Hugo MD;  Location:  MAIN OR;  Service: General    ANGIOPLASTY      W/ FLUOROSC ANGIOGRAPGY PERIPHERAL ARTERY ADDITIONAL  LAST ASSESSED [id number]    ANGIOPLASTY / STENTING FEMORAL      TANSCATH INTRAVASCULAR STENT PLACEMENT PERCUTANEOUS FEMORAL     COLONOSCOPY  2010    CT BONE MARROW BIOPSY AND ASPIRATION  8/9/2019    FULL THICKNESS SKIN GRAFT      TENDON REPAIR      TOE AMPUTATION Left 12/27/2018    Procedure: AMPUTATION left 4th TOE;  Surgeon: Jakob Matt DPM;  Location:  MAIN OR;  Service: Podiatry        06/29/20 1151   Note Type   Note type Eval only   Pain Assessment   Pain Assessment Tool Pain Assessment not indicated - pt denies pain   Pain Score No Pain   Home Living   Type of Home Assisted living  [facility])   Home Equipment Walker; Wheelchair-manual   Prior Function   Level of [place] Needs assistance with ADLs and functional mobility   Receives Help From Personal care attendant   ADL Assistance Needs assistance   IADLs Needs assistance   Falls in the last 6 months 1 to 4   Comments Pt reports stand pivot transfers with RW to W/C at baseline, inconsistent historian, at first reported use of prosthesis, toward end of session reports being fit for prosthesis awaiting arrival   Per recent documented PT visits, pt required assistance for STS, awaiting prosthesis arrival for gait training  Restrictions/Precautions   Weight Bearing Precautions Per Order No   Other Precautions Airborne/isolation;Contact/isolation; Chair Alarm;Cognitive; Bed Alarm; Fall Risk  (COVID-19 positive)   Cognition   Overall Cognitive Status Impaired   Arousal/Participation Alert   Orientation Level Oriented to person;Oriented to place;Oriented to time;Disoriented to situation   Memory Decreased recall of precautions Following Commands Follows one step commands with increased time or repetition   Comments Pt requires increased time for processing, sarcastic throughout session  RLE Assessment   RLE Assessment WFL  (Grossly 3+/5)   LLE Assessment   LLE Assessment WFL   Bed Mobility   Supine to Sit 5  Supervision   Additional items HOB elevated   Additional Comments Long sit with R LE dangle    Transfers   Sit to Stand 2  Maximal assistance   Additional items Assist x 2; Increased time required;Verbal cues   Stand to Sit 2  Maximal assistance   Additional items Assist x 2; Increased time required;Verbal cues   Sliding Board transfer 4  Minimal assistance  (pressover transfer with drop arm chair)   Additional items Assist x 1; Increased time required;Verbal cues   Additional Comments Pt able to achieve approximately 1/2 stand with RW from bedside chair  Balance   Static Sitting Fair   Dynamic Sitting Fair -   Static Standing Zero   Activity Tolerance   Activity Tolerance Patient limited by fatigue  (3L O2 NC, vitals stable, mild CROCKER noted)   Medical Staff Made Aware OT, Randi   Nurse Made Aware RN updated on transfer with drop arm, chair alarm intact at end of session, call bell in lap with lunch tray, pt able to teach back call method for return to bed    Assessment   Prognosis Fair   Problem List Decreased strength;Decreased endurance; Impaired balance;Decreased mobility; Decreased cognition;Decreased safety awareness; Obesity   Assessment Pt is a 81 y o  male seen for PT evaluation s/p admit to [facility] on 6/25/2020  Pt was admitted with a primary dx of: acute respiratory failure, PNA due to COVID-19 virus  PT now consulted for assessment of mobility and d/c needs  Pt with Activity as tolerated orders  Pts current comorbidities effecting treatment include: MM, COPD, CHF, L AKA, Afib, HTN, DM   Pts current clinical presentation is Unstable/ Unpredictable (high complexity) due to Ongoing medical management for primary dx, Decreased activity tolerance compared to baseline, Fall risk, Cog status, Increased O2 via NC from pts baseline  Prior to admission, pt was resident at [facility], has assistance with all ADL's  Pt is an inconsistent historian, reports independent transfers to W/C with RW at [facility]  Upon evaluation, pt currently is requiring supervision for bed mobility; [place]a for pressover transfer with drop arm chair  Pt presents at PT eval functioning below baseline and currently w/ overall mobility deficits 2* to: BLE weakness, impaired balance, decreased endurance, decreased activity tolerance compared to baseline, decreased functional mobility tolerance compared to baseline, decreased safety awareness, fall risk, decreased cognition  Pt currently at a fall risk 2* to impairments listed above  Pt will continue to benefit from skilled acute PT interventions to address stated impairments; to maximize functional mobility; for ongoing pt/ family training; and DME needs  At conclusion of PT session pt returned back in chair and chair alarm engaged with phone and call bell within reach  Pt denies any further questions at this time  Recommend return to [facility] upon hospital D/C  Goals   Patient Goals "to go home"   [facility] Expiration Date 07/09/20   Short Term Goal #1 In 10 days pt will be able to: 1  Demonstrate ability to perform all aspects of bed mobility independently to increase functional independence  2  Perform functional transfers with supervision to facilitate safe return to previous living environment  3   Improve LE strength grades by 1 to increase ease of functional mobility with transfers and gait  4  Pt will demonstrate improved balance by one grade order to decrease risk of falls  Plan   Treatment/Interventions Functional transfer training;LE strengthening/ROM; Therapeutic exercise; Endurance training;Cognitive reorientation;Patient/family training;Equipment eval/education; Bed mobility   PT Frequency 2-3x/wk   Recommendation PT Discharge Recommendation Return to previous environment with no needs  (back to [facility])   PT - OK to Discharge Yes   Additional Comments pending clarification of assistance provided at [facility]   Barthel Index   Feeding 10   Bathing 0   Grooming Score 5   Dressing Score 5   Bladder Score 5   Bowels Score 5   Toilet Use Score 0   Transfers (Bed/Chair) Score 10   Mobility (Level Surface) Score 0   Stairs Score 0   Barthel Index Score 40       Eliecer Love, PT, DPT

## 2020-06-29 NOTE — RESTORATIVE TECHNICIAN NOTE
Restorative Specialist Mobility Note           Assisted PCA with rolling of patient to finalize washing up and comfort rounds  Patient is comfortable and has no further needs  Thank you  Ayala Man Mobility Coordinator LCFo, LCOF, ASOP R  O T, O B T

## 2020-06-29 NOTE — ASSESSMENT & PLAN NOTE
· Rate controlled with metoprolol  · Warfarin for anticoagulation  · INR is increasing, likely secondary to antibiotics, decrease warfarin to 5 mg

## 2020-06-29 NOTE — CONSULTS
Consult Note - Wound   Trish Sciara 81 y o  male MRN: [id number]  Unit/Bed#: [facility] 821-01 Encounter: [id number]      History and Present Illness:  Consult triage via phone with primary RN Huy Louis  Of note patient was seen and skin fully assessed by charting wound care nurse and skin/wound care orders was discussed with primary RN and place as nursing orders  Per phone conversation this morning with primary RN, patient with new skin tear to RLE which was previously intact with venous stasis dermatitis  Per charting patient was found on the floor, likely traumatic skin tear secondary to fall  Discussed with primary RN on wound care, recommendation give to dressed skin tear with dermagran following skin tear guidelines  We will continue following on a weekly basis  Continue with followinSkin care plans:  1-Hydraguard to R heels BID and PRN  2-Elevate heels to offload pressure  3-Ehob cushion in chair when out of bed  4-Moisturize skin daily with skin nourishing cream   5-Turn/reposition q2h or when medically stable for pressure re-distribution on skin  6-Travis cream to sacrum, buttock and abdominal fold and groin TID and PRN  7-Allevyn foam to L posterior thigh, change every three days  8-Dermagran and Allevyn foam to L anterior thigh wound, change every three days  9-Cleanse RLE skin tear with NSS, dermagran to wound bed, DSD and ameena, Change every three days  Vitals: Blood pressure 154/52, pulse 70, temperature 100 4 °F (38 °C), temperature source Oral, resp  rate 18, height 6' 3" (1 905 m), weight (!) 154 kg (339 lb 11 2 oz), SpO2 94 %  ,Body mass index is 42 46 kg/m²  Our skin care recommendation are place as nursing orders, please call ext [phone]  Or [phone] with questions or concerns        Benjamin Horton, RN, BSN, [facility]

## 2020-06-29 NOTE — ASSESSMENT & PLAN NOTE
Wt Readings from Last 3 Encounters:   06/28/20 (!) 154 kg (339 lb 11 2 oz)   06/15/20 (!) 155 kg (341 lb 4 4 oz)   06/08/20 (!) 154 kg (340 lb 9 8 oz)     · Volume status is adequate  · Monitor intake and output  · Monitor daily weights  · Will hold torsemide tomorrow due to increased creatinine

## 2020-06-29 NOTE — ASSESSMENT & PLAN NOTE
· Acute kidney failure likely due to Sepsis evidenced by Cr 1 73 (baseline Cr 1 2)  · Creatinine slightly increased today, possibly due to fever, encouraged patient to increase fluid intake  · Avoiding IVF due to his history of CHF  · Avoid nephrotoxins, hypotension    · Monitor BMP

## 2020-06-29 NOTE — QUICK NOTE
Notified by RN that pt fell  VSS, pt being hoverlifted back into bed  Pt seen and examined with RN  Pt is A&Ox3  He reports he was trying to stand up because he wanted his water and lost his balance  Pt has L AKA and was not wearing his prosthetic at the time  Denies head strike, dizziness, lightheadedness  Of note, pt is on warfarin  Pt fully exposed for exam  No bruising noted, no tenderness to palpation of spine, pt able to move all extremities without difficulty  Denies pain in limbs, abdomen, chest, head  No imaging indicated at this time  Per RN, pt's RLE has been weeping, but had no open wounds  It appears that after the fall, the top layer of skin has split and is now open  Wound care consult placed  Also placed gerontology consult, as per RN pt has been spending a lot of time at night sitting on the edge of his bed staring at the wall, and has had some intermittent hallucinations of bugs in his room  Will continue to monitor for any changes

## 2020-06-29 NOTE — OCCUPATIONAL THERAPY NOTE
Occupational Therapy Evaluation     Patient Name: Aurea Moffett  SBLAW'P Date: 6/29/2020  Problem List  Principal Problem:    Acute respiratory failure with hypoxia due to Pneumonia due to COVID-19 virus  Active Problems:    Diabetes mellitus type 2, uncontrolled (HCC)    Essential hypertension    Chronic atrial fibrillation (HCC)    Chronic diastolic congestive heart failure (HCC)    Acute renal failure (ARF) (HCC)    Type 2 acute myocardial infarction (HCC)    Chronic obstructive pulmonary disease, unspecified (HCC)    CKD (chronic kidney disease)    Sepsis due to COVID-19 pneumonia [facility])    Past Medical History  Past Medical History:   Diagnosis Date    Cancer ([facility] )     CHF (congestive heart failure) (HCC)     Chronic kidney disease     [facility])     Decubitus ulcer of heel     LAST ASSESSED [id number]    Diabetes mellitus ([facility] )     History of varicose veins     Hypertension     Intermittent claudication (HCC)     Neuropathy     Seasonal allergies      Past Surgical History  Past Surgical History:   Procedure Laterality Date    AMPUTATION ABOVE KNEE (AKA) Left 7/31/2019    Procedure: AMPUTATION ABOVE KNEE (AKA); Surgeon: Kenia Ambriz MD;  Location:  MAIN OR;  Service: General    ANGIOPLASTY      W/ FLUOROSC ANGIOGRAPGY PERIPHERAL ARTERY ADDITIONAL  LAST ASSESSED [id number]    ANGIOPLASTY / STENTING FEMORAL      TANSCATH INTRAVASCULAR STENT PLACEMENT PERCUTANEOUS FEMORAL     COLONOSCOPY  2010    CT BONE MARROW BIOPSY AND ASPIRATION  8/9/2019    FULL THICKNESS SKIN GRAFT      TENDON REPAIR      TOE AMPUTATION Left 12/27/2018    Procedure: AMPUTATION left 4th TOE;  Surgeon: Wendy Gonzalez DPM;  Location:  MAIN OR;  Service: Podiatry             06/29/20 1200   Note Type   Note type Eval only   Restrictions/Precautions   Weight Bearing Precautions Per Order No   Other Precautions Contact/isolation; Airborne/isolation;Droplet precautions; Fall Risk;Pain;Cognitive; Chair Alarm; Bed Alarm  (COVID-19 confirmed, +3L 02)   Pain Assessment   Pain Assessment Tool Pain Assessment not indicated - pt denies pain   Pain Score No Pain   Home Living   Type of Home Assisted living  [facility])   Home Layout One level   Bathroom Shower/Tub Walk-in shower   Bathroom Toilet Raised   Bathroom Equipment Grab bars in shower; Shower chair;Grab bars around toilet   Bathroom Accessibility Accessible via wheelchair   [address] [facility]; Wheelchair-manual   Prior Function   Level of [place] Needs assistance with ADLs and functional mobility   Lives With Facility staff   Receives Help From Personal care attendant   ADL Assistance Needs assistance   IADLs Needs assistance   Falls in the last 6 months 1 to 4   Vocational Retired   [address] with ADL's/IADL's, +RW with transfers to w/c, w/c dependent mobility, has been receiving out patient PT for prosthetic training  [facility] facility   Service to Others retired   Intrinsic Gratification bingo   ADL   Where Assessed Edge of bed   Eating Assistance 5  Supervision/Setup   Eating Deficit Setup   Grooming Assistance 4  [address] [facility] 4  Minimal Assistance   LB [address] 2  Maximal [address] 3  Moderate Assistance   LB Dressing Assistance 2  Maximal [address]  2  Maximal Assistance   Bed Mobility   Supine to Sit 5  Supervision   Additional items Assist x 1;HOB elevated   Transfers   Sit to Stand 2  Maximal assistance   Additional items Assist x 2; Increased time required;Verbal cues   Stand to Sit 2  Maximal assistance   Additional items Assist x 2; Increased time required;Verbal cues   Sit pivot 4  Minimal assistance   Additional items Assist x 1; Increased time required;Verbal cues  (bed>drop arm chair verbal cues for encouragement and sequencing steps to center self in chair ) Additional Comments pt achieved approx~50% of stance with sit to stand transfer   Balance   Static Sitting Fair   Dynamic Sitting Poor +   Static Standing Poor -   Activity Tolerance   Activity Tolerance Patient limited by fatigue;Patient limited by pain   RUE Assessment   RUE Assessment WFL   LUE Assessment   LUE Assessment WFL   Hand Function   Gross Motor Coordination Functional   Fine Motor Coordination Functional   Cognition   Overall Cognitive Status Impaired   Arousal/Participation Alert; Responsive   Attention Attends with cues to redirect   Orientation Level Oriented to person;Oriented to place; Disoriented to situation;Disoriented to time   Memory Decreased recall of precautions;Decreased recall of recent events;Decreased short term memory   Following Commands Follows one step commands with increased time or repetition   Comments Pt alert and oriented x 2 to self, place, delayed processing, poor historian/ memory deficits (uses humor to cover deficits), verbal cues to to problem solve and sequence transfer steps, impulsive at times, decreased self awareness, safety awareness  Assessment   Limitation Decreased ADL status; Decreased endurance;Decreased cognition;Decreased Safe judgement during ADL;Decreased self-care trans;Decreased high-level ADLs   Prognosis Fair   Assessment Pt is a 81 y o  male who was admitted to [facility] on 6/25/2020 with cough, SOB Pneumonia due to COVID-19 virus ARF with hypoxia  Pt's problem list also includes PMH of diabetic foot ulcer, morbid obesity, GERD, hyperlipidemia, cardiomyopathy, gout, mass of psoas muscle, AKA left lower  At baseline pt was completing assistance with ADL's/IADL's, +RW with transfers to w/c, w/c mobility  Pt lives at [facility] Currently pt requires min a UB, max a LB for overall ADLS and min a x 1 for sit pivot transfers from bed>drop arm chair for transfers   Pt currently presents with impairments in the following categories -difficulty performing ADLS, difficulty performing IADLS , limited insight into deficits, compliance, flat affect, decreased initiation and engagement  and health management  activity tolerance, endurance, standing balance/tolerance, sitting balance/tolerance, memory, insight, safety , judgement , attention , sequencing  and communication  These impairments, as well as pt's fatigue, SOB and risk for falls  limit pt's ability to safely engage in all baseline areas of occupation, includingbathing, dressing, toileting, functional mobility/transfers, community mobility and leisure activities  From OT standpoint, recommend return to facility with appropriate level of care upon D/C  OT will continue to follow to address the below stated goals  Goals   Patient Goals sit up   LTG Time Frame 10-14   Long Term Goal #1 see goals below   Plan   Treatment Interventions ADL retraining;Functional transfer training;UE strengthening/ROM; Endurance training;Cognitive reorientation;Patient/family training;Equipment evaluation/education; Compensatory technique education; Activityengagement; Energy conservation   Goal Expiration Date 07/13/20   OT Frequency 3-5x/wk   Recommendation   OT Discharge Recommendation Return to previous environment with social support   OT - OK to Discharge Yes   Barthel Index   Feeding 10   Bathing 0   Grooming Score 5   Dressing Score 5   Bladder Score 5   Bowels Score 5   Toilet Use Score 5   Transfers (Bed/Chair) Score 5   Mobility (Level Surface) Score 0   Stairs Score 0   Barthel Index Score 40   Modified Moro Scale   Modified Moro Scale 4      Occupational Therapy Goals:    *S with bed mobility to engage in functional tasks    *S UB, mod a LB Adl's after setup with use of AE PRN  *S transfers to/from all surfaces with Fair + dynamic balance and safety for participation in dynamic adls and iadl tasks   *Demonstrate good carryover with safe use of RW during functional tasks   *Increase activity tolerance to 25-30 minutes for participation in [address] and enjoyable activities   *Increase orientation x 3 with environmental cues PRN with all treatment sessions      Jet Sadler MOT, OTR/L

## 2020-06-29 NOTE — ASSESSMENT & PLAN NOTE
Lab Results   Component Value Date    HGBA1C 8 8 (H) 06/25/2020       Recent Labs     06/28/20  2051 06/29/20  0746 06/29/20  1114 06/29/20  1637   POCGLU 214* 110 69 162*       Blood Sugar Average: Last 72 hrs:  (P) 276 5   · Uncontrolled due to IV steroid use  · Improved today  · Diabetic diet  · Will decrease insulin today as the patient had some mild hypoglycemia  · Continue sliding scale coverage

## 2020-06-29 NOTE — CONSULTS
Consultation - Geriatrics   Rhiannon Rodrigez 81 y o  male MRN: [id number]  Unit/Bed#: [facility] 821-01 Encounter: [id number]      Assessment/Plan    1  Acute metabolic encephalopathy  Alert and oriented x3, periods of confusion  With hallucination, seeing bugs at night, fall during the night  Recommend restart home melatonin 5 mg po QHS  Provide frequent redirection, reorientation, distraction techniques  Avoid deliriogenic medications such as tramadol, benzodiazepines, anticholinergics,  Benadryl  Treat pain, See geriatric pain protocol  Monitor for constipation and urinary retention  Encourage early and frequent moblization, OOB  Encourage Hydration/ Nutrition  Implement sleep hygiene, limit night time interuptions, group activities    Avoid physical restraints  Use chemical restraint only when other efforts have failed, recommend zyprexa 2 5mg IM q8h prn  QTc 438    2  Cognitive impairment  Pt reportedly not himself over last few months  Isolation contributing  Recommend frequent conversation, phone calls with family  Recommend check TSH and vitamin B12    3  Insomnia  Pt takes melatonin 5 mg po QHS prior to admission  Restart melatonin as inpatient  Maintain circadian rhythm    4  Ambulatory dysfunction  Hx of falls  Fall precautions  PT/OT  Rehab post hospitalization    5  Acute respiratory failure with hypoxia due to COVID 19 pneumonia  Moderate pathway  IM following    6   Medication Review/ Home medications  Attempted to call [facility] for list, no answer  [facility] [phone], list per pharmacy  acetaminophen 500 mg po Q6 hour prn  Acyclovir 400 mg po Qam  Allopurinol 300 mg po Qam  Atorvastatin 20 gm po Q 5 pm  Biscodyl suppository prn  Advair 1 puff Q12  lantus 22 units QHS  Metformin 500 mg po 1 daily and 2 Qpm  Metoprolol 25 gm po BID  Novolog 5 units TID with meals  Torsemide 20 gm po BID  Ventolin 2 puffs prn   Coumadin 7 5 mg every day except Wednesday, Wednesday 10 mg       History of Present Illness Physician Requesting Consult: Delaney Hale MD  Reason for Consult / Principal Problem: delirium, insomnia  Hx and PE limited by: delirium  HPI: Kanwal Cleaning is a 81y o  year old male who presents with productive cough and shortness of breath which was progressively getting worse over 1 week  Additionally he had loose bowel movements  COVID 19 positive 6/26/2020  Pt was admitted for treatment of sepsis/pneumonia due to COVID on moderate pathway  On 6/29/2020 pt was reported to fall while trying to stand up, lost his balance due to not wearing his prosthesis  Additionally pt reported to have been hallucinating seeing bugs in his room  He has afib, CHF, COPD, CKD, DM, HTN, multiple myeloma  Hx has a hx of left AKA with prosthesis  Prior to arrival pt [facility] at [facility]  He needs assistance with ADLs  He is primarily wheelchair bound  He wears glasses, he denies issues with hearing or dentition  He denies issues with bowel or bladder  He likes to watch TV  Upon exam pt in oob in recliner chair  He is alert and oriented x3  He does not know why he is in the hospital    Per nursing pt slept well last night, no further hallucinations  Attempted to call [facility], [phone], no answer      Inpatient consult to Gerontology  Consult performed by: NICOLA Mcdermott  Consult ordered by: Mildred Snow PA-C          Review of Systems   Constitutional: Negative for unexpected weight change  HENT: Negative for hearing loss  Eyes: Negative for visual disturbance  Respiratory: Negative for shortness of breath  Cardiovascular: Negative for chest pain  Gastrointestinal: Negative for constipation  Genitourinary: Negative for difficulty urinating  Musculoskeletal: Positive for gait problem  Skin: Negative for color change  Neurological: Negative for dizziness  Psychiatric/Behavioral: Negative for hallucinations and sleep disturbance         Historical Information Past Medical History:   Diagnosis Date    Cancer [facility])     CHF (congestive heart failure) (HCC)     Chronic kidney disease     COPD (chronic obstructive pulmonary disease) (HCC)     Decubitus ulcer of heel     LAST ASSESSED [id number]    Diabetes mellitus (HCC)     History of varicose veins     Hypertension     Intermittent claudication (HCC)     Neuropathy     Seasonal allergies      Past Surgical History:   Procedure Laterality Date    AMPUTATION ABOVE KNEE (AKA) Left 7/31/2019    Procedure: AMPUTATION ABOVE KNEE (AKA);   Surgeon: Chelsie Tabares MD;  [facility]: [facility];  Service: General    ANGIOPLASTY      W/ FLUOROSC ANGIOGRAPGY PERIPHERAL ARTERY ADDITIONAL  LAST ASSESSED [id number]    ANGIOPLASTY / STENTING FEMORAL      TANSCATH INTRAVASCULAR STENT PLACEMENT PERCUTANEOUS FEMORAL     COLONOSCOPY  2010    CT BONE MARROW BIOPSY AND ASPIRATION  8/9/2019    FULL THICKNESS SKIN GRAFT      TENDON REPAIR      TOE AMPUTATION Left 12/27/2018    Procedure: AMPUTATION left 4th TOE;  Surgeon: Stefany Newberry DPM;  [facility];  Service: Podiatry     Social History   Social History     Substance and Sexual Activity   Alcohol Use Not Currently     Social History     Substance and Sexual Activity   Drug Use Not Currently     Social History     Tobacco Use   Smoking Status Never Smoker   Smokeless Tobacco Never Used         Family History:   Family History   Problem Relation Age of Onset    Other Mother         CARDIAC DISORDER     Diabetes Mother     Cancer Father     Other Family         CARDIAC DISORDER     Diabetes Family     Cancer Family     Mental illness Family     Kidney disease Sister     Diabetes Sister        Meds/Allergies   Current meds:   Current Facility-Administered Medications   Medication Dose Route Frequency    acetaminophen (TYLENOL) tablet 650 mg  650 mg Oral Q6H PRN    acyclovir (ZOVIRAX) capsule 400 mg  400 mg Oral After Dinner    albuterol (PROVENTIL HFA,VENTOLIN HFA) inhaler 2 puff  2 puff Inhalation Q6H PRN    ascorbic acid (VITAMIN C) tablet 1,000 mg  1,000 mg Oral Q12H [facility]    atorvastatin (LIPITOR) tablet 40 mg  40 mg Oral After Dinner    bisacodyl (DULCOLAX) rectal suppository 10 mg  10 mg Rectal Daily PRN    ceftriaxone (ROCEPHIN) 1 g/50 mL in dextrose IVPB  1,000 mg Intravenous Q24H    cholecalciferol (VITAMIN D3) tablet 2,000 Units  2,000 Units Oral Daily    clotrimazole (LOTRIMIN) 1 % cream   Topical Q12H JULIEN    doxycycline hyclate (VIBRAMYCIN) capsule 100 mg  100 mg Oral Q12H    fluticasone-vilanterol (BREO ELLIPTA) 200-25 MCG/INH inhaler 1 puff  1 puff Inhalation Daily    insulin glargine (LANTUS) subcutaneous injection 42 Units 0 42 mL  42 Units Subcutaneous HS    insulin lispro (HumaLOG) 100 units/mL subcutaneous injection 1-5 Units  1-5 Units Subcutaneous HS    insulin lispro (HumaLOG) 100 units/mL subcutaneous injection 15 Units  15 Units Subcutaneous TID With Meals    insulin lispro (HumaLOG) 100 units/mL subcutaneous injection 2-12 Units  2-12 Units Subcutaneous TID AC    magnesium hydroxide (MILK OF MAGNESIA) 400 mg/5 mL oral suspension 30 mL  30 mL Oral Daily PRN    methylPREDNISolone sodium succinate (Solu-MEDROL) injection 125 mg  125 mg Intravenous Daily    metoprolol tartrate (LOPRESSOR) tablet 25 mg  25 mg Oral Q12H    moisture barrier miconazole 2% cream (aka KHADRA MOISTURE BARRIER ANTIFUNGAL CREAM)  1 application Topical TID    zinc sulfate (ZINCATE) capsule 220 mg  220 mg Oral Daily    Followed by   Vicky Márquez ON 7/2/2020] multivitamin-minerals (CENTRUM ADULTS) tablet 1 tablet  1 tablet Oral Daily    torsemide (DEMADEX) tablet 20 mg  20 mg Oral BID (diuretic)    warfarin (COUMADIN) tablet 5 mg  5 mg Oral Daily (warfarin)          Allergies   Allergen Reactions    Latex Rash       Objective   Vitals: Blood pressure 154/52, pulse 70, temperature 100 4 °F (38 °C), temperature source Oral, resp   rate 18, height 6' 3" (1 905 m), weight (!) 154 kg (339 lb 11 2 oz), SpO2 94 %  ,Body mass index is 42 46 kg/m²  Physical Exam   Constitutional: He is oriented to person, place, and time  He appears well-developed and well-nourished  No distress  HENT:   Head: Normocephalic  Eyes: Right eye exhibits no discharge  Left eye exhibits no discharge  Neck: Normal range of motion  Cardiovascular: Normal rate, regular rhythm and normal heart sounds  Exam reveals no gallop and no friction rub  No murmur heard  Pulmonary/Chest: Effort normal and breath sounds normal  No stridor  No respiratory distress  He has no wheezes  Abdominal: Soft  Bowel sounds are normal  He exhibits no distension  There is no tenderness  There is no guarding  Musculoskeletal: Normal range of motion  He exhibits no edema or deformity  Neurological: He is alert and oriented to person, place, and time  Skin: Skin is warm and dry  He is not diaphoretic  Psychiatric: He has a normal mood and affect  Nursing note and vitals reviewed  Lab Results:   Results from last 7 days   Lab Units 06/29/20  0558   WBC Thousand/uL 6 57   HEMOGLOBIN g/dL 11 3*   HEMATOCRIT % 37 3   PLATELETS Thousands/uL 209        Results from last 7 days   Lab Units 06/29/20  0558  06/26/20  0950   POTASSIUM mmol/L 3 8   < > 5 1   CHLORIDE mmol/L 105   < > 102   CO2 mmol/L 29   < > 25   BUN mg/dL 74*   < > 51*   CREATININE mg/dL 1 54*   < > 1 86*   CALCIUM mg/dL 9 1   < > 9 0   ALK PHOS U/L  --   --  150*   ALT U/L  --   --  28   AST U/L  --   --  61*    < > = values in this interval not displayed  Imaging Studies: I have personally reviewed pertinent reports  EKG, Pathology, and Other Studies: I have personally reviewed pertinent reports      VTE Prophylaxis: Sequential compression device (Venodyne)     Code Status: Level 1 - Full Code

## 2020-06-29 NOTE — ASSESSMENT & PLAN NOTE
· Pt presents with 1 week symptoms at [facility]  · Currently receiving COVID moderate treatment algorithm  · Continue ceftriaxone and doxycycline, given elevated procalcitonin, continue to trend  · Continue vitamin-C, vitamin-D, zinc supplementation  · Monitor temps, WBC, procalcitonin, inflammatory markers  · Awaiting L 6

## 2020-06-30 LAB
ALBUMIN SERPL BCP-MCNC: 2.5 G/DL (ref 3.5–5)
ALP SERPL-CCNC: 136 U/L (ref 46–116)
ALT SERPL W P-5'-P-CCNC: 40 U/L (ref 12–78)
ANION GAP SERPL CALCULATED.3IONS-SCNC: 6 MMOL/L (ref 4–13)
AST SERPL W P-5'-P-CCNC: 55 U/L (ref 5–45)
BACTERIA BLD CULT: NORMAL
BACTERIA BLD CULT: NORMAL
BASOPHILS # BLD AUTO: 0 THOUSANDS/ΜL (ref 0–0.1)
BASOPHILS NFR BLD AUTO: 0 % (ref 0–1)
BILIRUB DIRECT SERPL-MCNC: 0.24 MG/DL (ref 0–0.2)
BILIRUB SERPL-MCNC: 0.54 MG/DL (ref 0.2–1)
BUN SERPL-MCNC: 76 MG/DL (ref 5–25)
CALCIUM SERPL-MCNC: 9.1 MG/DL (ref 8.3–10.1)
CHLORIDE SERPL-SCNC: 106 MMOL/L (ref 100–108)
CO2 SERPL-SCNC: 28 MMOL/L (ref 21–32)
CREAT SERPL-MCNC: 1.46 MG/DL (ref 0.6–1.3)
CRP SERPL QL: 84.8 MG/L
EOSINOPHIL # BLD AUTO: 0 THOUSAND/ΜL (ref 0–0.61)
EOSINOPHIL NFR BLD AUTO: 0 % (ref 0–6)
ERYTHROCYTE [DISTWIDTH] IN BLOOD BY AUTOMATED COUNT: 15.7 % (ref 11.6–15.1)
FERRITIN SERPL-MCNC: 1861 NG/ML (ref 8–388)
GFR SERPL CREATININE-BSD FRML MDRD: 49 ML/MIN/1.73SQ M
GLUCOSE SERPL-MCNC: 103 MG/DL (ref 65–140)
GLUCOSE SERPL-MCNC: 187 MG/DL (ref 65–140)
GLUCOSE SERPL-MCNC: 204 MG/DL (ref 65–140)
GLUCOSE SERPL-MCNC: 207 MG/DL (ref 65–140)
GLUCOSE SERPL-MCNC: 96 MG/DL (ref 65–140)
HCT VFR BLD AUTO: 35.1 % (ref 36.5–49.3)
HGB BLD-MCNC: 10.8 G/DL (ref 12–17)
IL6 SERPL-MCNC: 104.2 PG/ML (ref 0–15.5)
IL6 SERPL-MCNC: 48 PG/ML (ref 0–12.2)
IMM GRANULOCYTES # BLD AUTO: 0.03 THOUSAND/UL (ref 0–0.2)
IMM GRANULOCYTES NFR BLD AUTO: 1 % (ref 0–2)
LYMPHOCYTES # BLD AUTO: 0.29 THOUSANDS/ΜL (ref 0.6–4.47)
LYMPHOCYTES NFR BLD AUTO: 6 % (ref 14–44)
MAGNESIUM SERPL-MCNC: 2.1 MG/DL (ref 1.6–2.6)
MCH RBC QN AUTO: 28 PG (ref 26.8–34.3)
MCHC RBC AUTO-ENTMCNC: 30.8 G/DL (ref 31.4–37.4)
MCV RBC AUTO: 91 FL (ref 82–98)
MONOCYTES # BLD AUTO: 0.2 THOUSAND/ΜL (ref 0.17–1.22)
MONOCYTES NFR BLD AUTO: 4 % (ref 4–12)
NEUTROPHILS # BLD AUTO: 4.28 THOUSANDS/ΜL (ref 1.85–7.62)
NEUTS SEG NFR BLD AUTO: 89 % (ref 43–75)
NRBC BLD AUTO-RTO: 0 /100 WBCS
PHOSPHATE SERPL-MCNC: 5 MG/DL (ref 2.3–4.1)
PLATELET # BLD AUTO: 190 THOUSANDS/UL (ref 149–390)
PMV BLD AUTO: 11.2 FL (ref 8.9–12.7)
POTASSIUM SERPL-SCNC: 4 MMOL/L (ref 3.5–5.3)
PROCALCITONIN SERPL-MCNC: 0.55 NG/ML
PROT SERPL-MCNC: 6.2 G/DL (ref 6.4–8.2)
RBC # BLD AUTO: 3.86 MILLION/UL (ref 3.88–5.62)
SODIUM SERPL-SCNC: 140 MMOL/L (ref 136–145)
WBC # BLD AUTO: 4.8 THOUSAND/UL (ref 4.31–10.16)

## 2020-06-30 PROCEDURE — 82728 ASSAY OF FERRITIN: CPT | Performed by: INTERNAL MEDICINE

## 2020-06-30 PROCEDURE — 84145 PROCALCITONIN (PCT): CPT | Performed by: INTERNAL MEDICINE

## 2020-06-30 PROCEDURE — 82948 REAGENT STRIP/BLOOD GLUCOSE: CPT

## 2020-06-30 PROCEDURE — 99232 SBSQ HOSP IP/OBS MODERATE 35: CPT | Performed by: NURSE PRACTITIONER

## 2020-06-30 PROCEDURE — 86140 C-REACTIVE PROTEIN: CPT | Performed by: INTERNAL MEDICINE

## 2020-06-30 PROCEDURE — 99233 SBSQ HOSP IP/OBS HIGH 50: CPT | Performed by: INTERNAL MEDICINE

## 2020-06-30 PROCEDURE — 84100 ASSAY OF PHOSPHORUS: CPT | Performed by: INTERNAL MEDICINE

## 2020-06-30 PROCEDURE — 83735 ASSAY OF MAGNESIUM: CPT | Performed by: INTERNAL MEDICINE

## 2020-06-30 PROCEDURE — 85025 COMPLETE CBC W/AUTO DIFF WBC: CPT | Performed by: INTERNAL MEDICINE

## 2020-06-30 PROCEDURE — 80076 HEPATIC FUNCTION PANEL: CPT | Performed by: INTERNAL MEDICINE

## 2020-06-30 PROCEDURE — 80048 BASIC METABOLIC PNL TOTAL CA: CPT | Performed by: INTERNAL MEDICINE

## 2020-06-30 RX ADMIN — MELATONIN 6 MG: at 22:30

## 2020-06-30 RX ADMIN — MICONAZOLE NITRATE 1 APPLICATION: 20 CREAM TOPICAL at 08:17

## 2020-06-30 RX ADMIN — DOXYCYCLINE 100 MG: 100 CAPSULE ORAL at 22:30

## 2020-06-30 RX ADMIN — OXYCODONE HYDROCHLORIDE AND ACETAMINOPHEN 1000 MG: 500 TABLET ORAL at 22:30

## 2020-06-30 RX ADMIN — INSULIN LISPRO 1 UNITS: 100 INJECTION, SOLUTION INTRAVENOUS; SUBCUTANEOUS at 22:31

## 2020-06-30 RX ADMIN — FLUTICASONE FUROATE AND VILANTEROL TRIFENATATE 1 PUFF: 200; 25 POWDER RESPIRATORY (INHALATION) at 08:16

## 2020-06-30 RX ADMIN — CLOTRIMAZOLE 1 APPLICATION: 10 CREAM TOPICAL at 08:17

## 2020-06-30 RX ADMIN — CLOTRIMAZOLE: 10 CREAM TOPICAL at 22:17

## 2020-06-30 RX ADMIN — METOPROLOL TARTRATE 25 MG: 25 TABLET, FILM COATED ORAL at 22:31

## 2020-06-30 RX ADMIN — ACYCLOVIR 400 MG: 200 CAPSULE ORAL at 17:50

## 2020-06-30 RX ADMIN — MICONAZOLE NITRATE 1 APPLICATION: 20 CREAM TOPICAL at 17:45

## 2020-06-30 RX ADMIN — INSULIN LISPRO 4 UNITS: 100 INJECTION, SOLUTION INTRAVENOUS; SUBCUTANEOUS at 11:52

## 2020-06-30 RX ADMIN — DOXYCYCLINE 100 MG: 100 CAPSULE ORAL at 10:01

## 2020-06-30 RX ADMIN — WARFARIN SODIUM 5 MG: 5 TABLET ORAL at 17:48

## 2020-06-30 RX ADMIN — INSULIN GLARGINE 35 UNITS: 100 INJECTION, SOLUTION SUBCUTANEOUS at 22:31

## 2020-06-30 RX ADMIN — INSULIN LISPRO 2 UNITS: 100 INJECTION, SOLUTION INTRAVENOUS; SUBCUTANEOUS at 17:50

## 2020-06-30 RX ADMIN — CEFTRIAXONE SODIUM 1000 MG: 1 INJECTION, POWDER, FOR SOLUTION INTRAMUSCULAR; INTRAVENOUS at 13:14

## 2020-06-30 RX ADMIN — OXYCODONE HYDROCHLORIDE AND ACETAMINOPHEN 1000 MG: 500 TABLET ORAL at 08:15

## 2020-06-30 RX ADMIN — ATORVASTATIN CALCIUM 40 MG: 40 TABLET, FILM COATED ORAL at 17:48

## 2020-06-30 RX ADMIN — MELATONIN 2000 UNITS: at 08:16

## 2020-06-30 RX ADMIN — MICONAZOLE NITRATE 1 APPLICATION: 20 CREAM TOPICAL at 22:18

## 2020-06-30 RX ADMIN — METHYLPREDNISOLONE SODIUM SUCCINATE 125 MG: 125 INJECTION, POWDER, FOR SOLUTION INTRAMUSCULAR; INTRAVENOUS at 08:16

## 2020-06-30 RX ADMIN — ZINC SULFATE 220 MG (50 MG) CAPSULE 220 MG: CAPSULE at 08:16

## 2020-06-30 RX ADMIN — METOPROLOL TARTRATE 25 MG: 25 TABLET, FILM COATED ORAL at 08:16

## 2020-06-30 NOTE — ASSESSMENT & PLAN NOTE
Lab Results   Component Value Date    HGBA1C 8 8 (H) 06/25/2020       Recent Labs     06/29/20  1637 06/29/20  2145 06/30/20  0748 06/30/20  1148   POCGLU 162* 247* 103 204*       Blood Sugar Average: Last 72 hrs:  (P) 215 [id number]   · Uncontrolled due to IV steroid use  · Improved today  · Diabetic diet  · Will decrease insulin today as the patient had some mild hypoglycemia  · Continue sliding scale coverage

## 2020-06-30 NOTE — RESTORATIVE TECHNICIAN NOTE
Restorative Specialist Mobility Note       Activity: Other (Comment)(Pt refusing OOB at this time   Encouraged and educated importance of mobility )

## 2020-06-30 NOTE — ASSESSMENT & PLAN NOTE
· Well controlled    · Continue metoprolol   · Holding torsemide tomorrow due to increased creatinine today

## 2020-06-30 NOTE — PLAN OF CARE
Problem: PAIN - ADULT  Goal: Verbalizes/displays adequate comfort level or baseline comfort level  Description  Interventions:  - Encourage patient to monitor pain and request assistance  - Assess pain using appropriate pain scale  - Administer analgesics based on type and severity of pain and evaluate response  - Implement non-pharmacological measures as appropriate and evaluate response  - Consider cultural and social influences on pain and pain management  - Notify physician/advanced practitioner if interventions unsuccessful or patient reports new pain  Outcome: Progressing     Problem: INFECTION - ADULT  Goal: Absence or prevention of progression during hospitalization  Description  INTERVENTIONS:  - Assess and monitor for signs and symptoms of infection  - Monitor lab/diagnostic results  - Monitor all insertion sites, i e  indwelling lines, tubes, and drains  - Monitor endotracheal if appropriate and nasal secretions for changes in amount and color  - [place] appropriate cooling/warming therapies per order  - Administer medications as ordered  - Instruct and encourage patient and family to use good hand hygiene technique  - Identify and instruct in appropriate isolation precautions for identified infection/condition  Outcome: Progressing  Goal: Absence of fever/infection during neutropenic period  Description  INTERVENTIONS:  - Monitor WBC    Outcome: Progressing     Problem: SAFETY ADULT  Goal: Patient will remain free of falls  Description  INTERVENTIONS:  - Assess patient frequently for physical needs  -  Identify cognitive and physical deficits and behaviors that affect risk of falls    -  [place] fall precautions as indicated by assessment   - Educate patient/family on patient safety including physical limitations  - Instruct patient to call for assistance with activity based on assessment  - Modify environment to reduce risk of injury  - Consider OT/PT consult to assist with strengthening/mobility  Outcome: Progressing  Goal: Maintain or return to baseline ADL function  Description  INTERVENTIONS:  -  Assess patient's ability to carry out ADLs; assess patient's baseline for ADL function and identify physical deficits which impact ability to perform ADLs (bathing, care of mouth/teeth, toileting, grooming, dressing, etc )  - Assess/evaluate cause of self-care deficits   - Assess range of motion  - Assess patient's mobility; develop plan if impaired  - Assess patient's need for assistive devices and provide as appropriate  - Encourage maximum independence but intervene and supervise when necessary  - Involve family in performance of ADLs  - Assess for home care needs following discharge   - Consider OT consult to assist with ADL evaluation and planning for discharge  - Provide patient education as appropriate  Outcome: Progressing  Goal: Maintain or return mobility status to optimal level  Description  INTERVENTIONS:  - Assess patient's baseline mobility status (ambulation, transfers, stairs, etc )    - Identify cognitive and physical deficits and behaviors that affect mobility  - Identify mobility aids required to assist with transfers and/or ambulation (gait belt, sit-to-stand, lift, walker, cane, etc )  - [place] fall precautions as indicated by assessment  - Record patient progress and toleration of activity level on Mobility SBAR; progress patient to next Phase/Stage  - Instruct patient to call for assistance with activity based on assessment  - Consider rehabilitation consult to assist with strengthening/weightbearing, etc   Outcome: Progressing     Problem: Prexisting or High Potential for Compromised Skin Integrity  Goal: Skin integrity is maintained or improved  Description  INTERVENTIONS:  - Identify patients at risk for skin breakdown  - Assess and monitor skin integrity  - Assess and monitor nutrition and hydration status  - Monitor labs   - Assess for incontinence   - Turn and reposition patient  - Assist with mobility/ambulation  - Relieve pressure over bony prominences  - Avoid friction and shearing  - Provide appropriate hygiene as needed including keeping skin clean and dry  - Evaluate need for skin moisturizer/barrier cream  - Collaborate with interdisciplinary team   - Patient/family teaching  - Consider wound care consult   Outcome: Progressing     Problem: Potential for Falls  Goal: Patient will remain free of falls  Description  INTERVENTIONS:  - Assess patient frequently for physical needs  -  Identify cognitive and physical deficits and behaviors that affect risk of falls    -  [place] fall precautions as indicated by assessment   - Educate patient/family on patient safety including physical limitations  - Instruct patient to call for assistance with activity based on assessment  - Modify environment to reduce risk of injury  - Consider OT/PT consult to assist with strengthening/mobility  Outcome: Progressing

## 2020-06-30 NOTE — ASSESSMENT & PLAN NOTE
Wt Readings from Last 3 Encounters:   06/30/20 (!) 156 kg (343 lb 11 2 oz)   06/15/20 (!) 155 kg (341 lb 4 4 oz)   06/08/20 (!) 154 kg (340 lb 9 8 oz)     · Volume status is adequate  · Monitor intake and output  · Monitor daily weights  · Will hold torsemide tomorrow due to increased creatinine

## 2020-06-30 NOTE — PROGRESS NOTES
Progress Note - Rhiannon Rodrigez 1950, 81 y o  male MRN: [id number]    Unit/Bed#: [facility] 821-01 Encounter: [id number]    Primary Care Provider: Hiral Amaro MD   Date and time admitted to hospital: 6/25/2020 11:43 AM        Sepsis due to COVID-19 pneumonia [facility])  Assessment & Plan  · Patient presenting with tachycardia, tachypnea, hypoxia and confirm COVID-19 pneumonia  · Elevated procalcitonin  Continue antibiotics as above  · Blood cultures negative so far    CKD (chronic kidney disease)  Assessment & Plan  · Baseline creatinine around 1 2   · Management as above    Chronic obstructive pulmonary disease, unspecified ([facility] [address] )  Assessment & Plan  · Stable without any wheezing  · Continue home inhalers  Type 2 acute myocardial infarction [facility])  Assessment & Plan  · Likely due to acute respiratory failure from COVID-19 pneumonia  · Patient denies any chest pain or shortness of breath  · Will continue warfarin for anticoagulation given his lack of symptoms at this time  · Outpatient cardiac evaluation when stable    Acute renal failure (ARF) ([facility])  Assessment & Plan  · Acute kidney failure likely due to Sepsis evidenced by Cr 1 73 (baseline Cr 1 2)  · Creatinine slightly increased today, possibly due to fever, encouraged patient to increase fluid intake  · Avoiding IVF due to his history of CHF  · Avoid nephrotoxins, hypotension    · Monitor BMP    Chronic diastolic congestive heart failure (HCC)  Assessment & Plan  Wt Readings from Last 3 Encounters:   06/30/20 (!) 156 kg (343 lb 11 2 oz)   06/15/20 (!) 155 kg (341 lb 4 4 oz)   06/08/20 (!) 154 kg (340 lb 9 8 oz)     · Volume status is adequate  · Monitor intake and output  · Monitor daily weights  · Will hold torsemide tomorrow due to increased creatinine    Chronic atrial fibrillation (HCC)  Assessment & Plan  · Rate controlled with metoprolol  · Warfarin for anticoagulation  · INR is increasing, likely secondary to antibiotics, check iNR in the morning    Essential hypertension  Assessment & Plan  · Well controlled  · Continue metoprolol   · Holding torsemide tomorrow due to increased creatinine today    Diabetes mellitus type 2, uncontrolled ([facility] [address] )  Assessment & Plan  Lab Results   Component Value Date    HGBA1C 8 8 (H) 2020       Recent Labs     20  1637 20  2145 20  0748 20  1148   POCGLU 162* 247* 103 204*       Blood Sugar Average: Last 72 hrs:  (P) 215 [id number]   · Uncontrolled due to IV steroid use  · Improved today  · Diabetic diet  · Will decrease insulin today as the patient had some mild hypoglycemia  · Continue sliding scale coverage    * Acute respiratory failure with hypoxia due to Pneumonia due to COVID-19 virus  Assessment & Plan  · Pt [facility] with 1 week [facility]  · Currently receiving COVID moderate treatment algorithm  · Continue ceftriaxone and doxycycline, given elevated procalcitonin, continue to trend  · Continue vitamin-C, vitamin-D, zinc supplementation  · Monitor temps, WBC, procalcitonin, inflammatory markers  · Awaiting L 6           VTE Pharmacologic Prophylaxis:   Pharmacologic: Warfarin (Coumadin)  Mechanical VTE Prophylaxis in Place: Yes    Patient Centered Rounds: I have performed bedside rounds with nursing staff today  Discussions with Specialists or Other Care Team Provider:      Education and Discussions with Family / Patient: patient     Time Spent for Care: 45 minutes  More than 50% of total time spent on counseling and coordination of care as described above  Current Length of Stay: 5 day(s)    Current Patient Status: Inpatient   Certification Statement: The patient will continue to require additional inpatient hospital stay due to [place] 19 intefection    Discharge Plan: pending improvement     Code Status: Level 1 - Full Code      Subjective:   Patient denied complaints   Monitoring     Objective:     Vitals:   Temp (24hrs), Av 6 °F (36 4 °C), Min:97 5 °F (36 4 °C), Max:97 7 °F (36 5 °C)    Temp:  [97 5 °F (36 4 °C)-97 7 °F (36 5 °C)] 97 5 °F (36 4 °C)  HR:  [74-89] 78  Resp:  [20-22] 20  BP: (129-135)/(70-82) 129/70  SpO2:  [93 %-95 %] 95 %  Body mass index is 42 96 kg/m²  Input and Output Summary (last 24 hours): Intake/Output Summary (Last 24 hours) at 6/30/2020 1450  Last data filed at 6/30/2020 1230  Gross per 24 hour   Intake 700 ml   Output 2550 ml   Net -1850 ml       Physical Exam:     Physical Exam   Constitutional: He is oriented to person, place, and time  He appears well-developed and well-nourished  HENT:   Head: Normocephalic and atraumatic  Right Ear: External ear normal    Left Ear: External ear normal    Nose: Nose normal    Mouth/Throat: Oropharynx is clear and moist  No oropharyngeal exudate  Eyes: Pupils are equal, round, and reactive to light  Conjunctivae and EOM are normal  Right eye exhibits no discharge  Left eye exhibits discharge  No scleral icterus  Neck: Normal range of motion  Neck supple  No JVD present  No tracheal deviation present  No thyromegaly present  Cardiovascular: Normal rate, regular rhythm, normal heart sounds and intact distal pulses  Exam reveals no gallop and no friction rub  No murmur heard  Pulmonary/Chest: Effort normal  No stridor  No respiratory distress  He has wheezes  Abdominal: Bowel sounds are normal  He exhibits no distension  There is no tenderness  There is no guarding  Musculoskeletal: Normal range of motion  He exhibits no edema, tenderness or deformity  Lymphadenopathy:     He has no cervical adenopathy  Neurological: He is alert and oriented to person, place, and time  He displays normal reflexes  No cranial nerve deficit  Coordination normal    Skin: Skin is warm and dry  No rash noted  No erythema  No pallor  Psychiatric: He has a normal mood and affect  Nursing note and vitals reviewed        Additional Data:     Labs:    Results from last 7 days   Lab Units 06/30/20  0559   WBC Thousand/uL 4 80   HEMOGLOBIN g/dL 10 8*   HEMATOCRIT % 35 1*   PLATELETS Thousands/uL 190   NEUTROS PCT % 89*   LYMPHS PCT % 6*   MONOS PCT % 4   EOS PCT % 0     Results from last 7 days   Lab Units 06/30/20  0559   POTASSIUM mmol/L 4 0   CHLORIDE mmol/L 106   CO2 mmol/L 28   BUN mg/dL 76*   CREATININE mg/dL 1 46*   CALCIUM mg/dL 9 1   ALK PHOS U/L 136*   ALT U/L 40   AST U/L 55*     Results from last 7 days   Lab Units 06/29/20  0558   INR  2 74*       * I Have Reviewed All Lab Data Listed Above  * Additional Pertinent Lab Tests Reviewed: [place] Admission Reviewed    Imaging:    Imaging Reports Reviewed Today Include:    Imaging Personally Reviewed by Myself Includes:  *     Recent Cultures (last 7 days):     Results from last 7 days   Lab Units 06/25/20  1206   BLOOD CULTURE  No Growth After 4 Days  No Growth After 4 Days         Last 24 Hours Medication List:     Current Facility-Administered Medications:  acetaminophen 650 mg Oral Q6H PRN Shaina Su MD    acyclovir 400 mg Oral After Tonya George MD    albuterol 2 puff Inhalation Q6H PRN Shaina Su MD    ascorbic acid 1,000 mg Oral Q12H [facility] Shaina Su MD    atorvastatin 40 mg Oral After Tonya George MD    bisacodyl 10 mg Rectal Daily PRN Shaina Su MD    cefTRIAXone 1,000 mg Intravenous Q24H Shaina Su MD Last Rate: 1,000 mg (06/30/20 1314)   cholecalciferol 2,000 Units Oral Daily Shaina Su MD    clotrimazole  Topical Q12H Madison Peo MD    doxycycline hyclate 100 mg Oral Q12H Shaina Su MD    fluticasone-vilanterol 1 puff Inhalation Daily Shaina Su MD    insulin glargine 35 Units Subcutaneous HS Arlin Sabillon MD    insulin lispro 1-5 Units Subcutaneous HS Chris Guadalupe PA-C    insulin lispro 12 Units Subcutaneous TID With Meals Arlin Sabillon MD    insulin lispro 2-12 Units Subcutaneous TID AC Naila Lehman MD    magnesium hydroxide 30 mL Oral Daily PRN Naila Lehman MD    melatonin 6 mg Oral HS Tuesday M NICOLA Heath    methylPREDNISolone sodium succinate 125 mg Intravenous Daily Naila Lehman MD    metoprolol tartrate 25 mg Oral Q12H Naila Lehman MD    Yoli Nett ANTIFUNGAL 1 application Topical TID Apollo Guardado MD    zinc sulfate 220 mg Oral Daily Naila Lehman MD    Followed by        Cici Lay ON 7/2/2020] multivitamin-minerals 1 tablet Oral Daily Naila Lehman MD    [START ON 7/1/2020] torsemide 20 mg Oral BID (diuretic) Apollo Guardado MD    warfarin 5 mg Oral Daily (warfarin) Apollo Guardado MD         Today, Patient Was Seen By: Lin Nicholas MD    ** Please Note: Dictation voice to text software may have been used in the creation of this document   **

## 2020-06-30 NOTE — PROGRESS NOTES
Progress Note - Reji Height 81 y o  male MRN: [id number]    Unit/Bed#: [facility] 821-01 Encounter: [id number]      Assessment/Plan:  1  Acute metabolic encephalopathy  Alert and oriented x3  No reported confusion  Continue melatonin 5 mg po QHS  Provide frequent redirection, reorientation, distraction techniques  Avoid deliriogenic medications such as tramadol, benzodiazepines, anticholinergics,  Benadryl  Treat pain, See geriatric pain protocol  Monitor for constipation and urinary retention  Encourage early and frequent moblization, OOB  Encourage Hydration/ Nutrition  Implement sleep hygiene, limit night time interuptions, group activities    2  Cognitive impairment  Pt reportedly not himself over last few months  Isolation due to COVID contributing  Recommend frequent conversations, phone calls with family  Recommend check TSH and vitamin B12    3  [place]  Continue melatonin 5 mg po QHS  Maintain circadian rhythm  Encourage daytime activity    4  Ambulatory dysfunction  Hx of falls  Fall precautions  PT/OT  Rehab post hospitalization    5  Acute respiratory failure with hypoxia due to COVID 19 pneumonia  Moderate pathway  IM following                    Subjective:   Upon exam pt is in bed  He is alert and oriented x3  Pt states he slept well  Per nursing no issues overnight  No reports of hallucination or confusion  Objective:     Vitals: Blood pressure 129/70, pulse 78, temperature 97 5 °F (36 4 °C), temperature source Oral, resp  rate 20, height 6' 3" (1 905 m), weight (!) 156 kg (343 lb 11 2 oz), SpO2 95 %  ,Body mass index is 42 96 kg/m²  Intake/Output Summary (Last 24 hours) at 6/30/2020 1102  Last data filed at 6/30/2020 1000  Gross per 24 hour   Intake 540 ml   Output 2850 ml   Net -2310 ml       Physical Exam:   General : NAD  HEENT : MMM   Heart : Normal rate, no murmur rub or gallop  Lungs : CTA no wheezes, rales or rhonchi  Abdomen : Soft, NT/ND, BS auscultated in all 4 quads     Ext : no edema, left AKA  Skin : Pink, warm, dry, age appropriate turgor and mobility  Neuro : Nonfocal  Psych : Alert and O x 3      Invasive Devices     Peripheral Intravenous Line            Peripheral IV 06/30/20 Left Antecubital less than 1 day                Lab, Imaging and other studies: I have personally reviewed pertinent reports      VTE Pharmacologic Prophylaxis: Sequential compression device (Venodyne)   VTE Mechanical Prophylaxis: sequential compression device

## 2020-06-30 NOTE — ASSESSMENT & PLAN NOTE
· Rate controlled with metoprolol  · Warfarin for anticoagulation  · INR is increasing, likely secondary to antibiotics, check iNR in the morning

## 2020-06-30 NOTE — ASSESSMENT & PLAN NOTE
· Pt presents with 1 week symptoms at [facility]  · Currently receiving COVID moderate treatment algorithm  · Continue ceftriaxone and doxycycline, given elevated procalcitonin, continue to trend  · Continue vitamin-C, vitamin-D, zinc supplementation  · Monitor temps, WBC, procalcitonin, inflammatory markers  · Awaiting L 6

## 2020-07-01 LAB
ANION GAP SERPL CALCULATED.3IONS-SCNC: 9 MMOL/L (ref 4–13)
BASOPHILS # BLD AUTO: 0.01 THOUSANDS/ΜL (ref 0–0.1)
BASOPHILS NFR BLD AUTO: 0 % (ref 0–1)
BUN SERPL-MCNC: 76 MG/DL (ref 5–25)
CALCIUM SERPL-MCNC: 8.7 MG/DL (ref 8.3–10.1)
CHLORIDE SERPL-SCNC: 105 MMOL/L (ref 100–108)
CO2 SERPL-SCNC: 25 MMOL/L (ref 21–32)
CREAT SERPL-MCNC: 1.29 MG/DL (ref 0.6–1.3)
EOSINOPHIL # BLD AUTO: 0 THOUSAND/ΜL (ref 0–0.61)
EOSINOPHIL NFR BLD AUTO: 0 % (ref 0–6)
ERYTHROCYTE [DISTWIDTH] IN BLOOD BY AUTOMATED COUNT: 15.3 % (ref 11.6–15.1)
GFR SERPL CREATININE-BSD FRML MDRD: 57 ML/MIN/1.73SQ M
GLUCOSE SERPL-MCNC: 112 MG/DL (ref 65–140)
GLUCOSE SERPL-MCNC: 114 MG/DL (ref 65–140)
GLUCOSE SERPL-MCNC: 141 MG/DL (ref 65–140)
GLUCOSE SERPL-MCNC: 155 MG/DL (ref 65–140)
GLUCOSE SERPL-MCNC: 162 MG/DL (ref 65–140)
HCT VFR BLD AUTO: 37.6 % (ref 36.5–49.3)
HGB BLD-MCNC: 11.2 G/DL (ref 12–17)
IMM GRANULOCYTES # BLD AUTO: 0.04 THOUSAND/UL (ref 0–0.2)
IMM GRANULOCYTES NFR BLD AUTO: 1 % (ref 0–2)
LYMPHOCYTES # BLD AUTO: 0.29 THOUSANDS/ΜL (ref 0.6–4.47)
LYMPHOCYTES NFR BLD AUTO: 6 % (ref 14–44)
MCH RBC QN AUTO: 27.4 PG (ref 26.8–34.3)
MCHC RBC AUTO-ENTMCNC: 29.8 G/DL (ref 31.4–37.4)
MCV RBC AUTO: 92 FL (ref 82–98)
MONOCYTES # BLD AUTO: 0.16 THOUSAND/ΜL (ref 0.17–1.22)
MONOCYTES NFR BLD AUTO: 3 % (ref 4–12)
NEUTROPHILS # BLD AUTO: 4.55 THOUSANDS/ΜL (ref 1.85–7.62)
NEUTS SEG NFR BLD AUTO: 90 % (ref 43–75)
NRBC BLD AUTO-RTO: 0 /100 WBCS
PLATELET # BLD AUTO: 204 THOUSANDS/UL (ref 149–390)
PMV BLD AUTO: 10.8 FL (ref 8.9–12.7)
POTASSIUM SERPL-SCNC: 4 MMOL/L (ref 3.5–5.3)
RBC # BLD AUTO: 4.09 MILLION/UL (ref 3.88–5.62)
SODIUM SERPL-SCNC: 139 MMOL/L (ref 136–145)
WBC # BLD AUTO: 5.05 THOUSAND/UL (ref 4.31–10.16)

## 2020-07-01 PROCEDURE — 85025 COMPLETE CBC W/AUTO DIFF WBC: CPT | Performed by: INTERNAL MEDICINE

## 2020-07-01 PROCEDURE — 97110 THERAPEUTIC EXERCISES: CPT

## 2020-07-01 PROCEDURE — 99233 SBSQ HOSP IP/OBS HIGH 50: CPT | Performed by: INTERNAL MEDICINE

## 2020-07-01 PROCEDURE — 97112 NEUROMUSCULAR REEDUCATION: CPT

## 2020-07-01 PROCEDURE — 97530 THERAPEUTIC ACTIVITIES: CPT

## 2020-07-01 PROCEDURE — 82948 REAGENT STRIP/BLOOD GLUCOSE: CPT

## 2020-07-01 PROCEDURE — 80048 BASIC METABOLIC PNL TOTAL CA: CPT | Performed by: INTERNAL MEDICINE

## 2020-07-01 RX ADMIN — INSULIN LISPRO 2 UNITS: 100 INJECTION, SOLUTION INTRAVENOUS; SUBCUTANEOUS at 12:00

## 2020-07-01 RX ADMIN — CLOTRIMAZOLE: 10 CREAM TOPICAL at 21:27

## 2020-07-01 RX ADMIN — CLOTRIMAZOLE 1 APPLICATION: 10 CREAM TOPICAL at 08:13

## 2020-07-01 RX ADMIN — METOPROLOL TARTRATE 25 MG: 25 TABLET, FILM COATED ORAL at 08:14

## 2020-07-01 RX ADMIN — METHYLPREDNISOLONE SODIUM SUCCINATE 125 MG: 125 INJECTION, POWDER, FOR SOLUTION INTRAMUSCULAR; INTRAVENOUS at 08:15

## 2020-07-01 RX ADMIN — MAGNESIUM HYDROXIDE 30 ML: 400 SUSPENSION ORAL at 13:51

## 2020-07-01 RX ADMIN — OXYCODONE HYDROCHLORIDE AND ACETAMINOPHEN 1000 MG: 500 TABLET ORAL at 21:26

## 2020-07-01 RX ADMIN — TORSEMIDE 20 MG: 20 TABLET ORAL at 17:02

## 2020-07-01 RX ADMIN — INSULIN GLARGINE 35 UNITS: 100 INJECTION, SOLUTION SUBCUTANEOUS at 21:26

## 2020-07-01 RX ADMIN — INSULIN LISPRO 2 UNITS: 100 INJECTION, SOLUTION INTRAVENOUS; SUBCUTANEOUS at 17:02

## 2020-07-01 RX ADMIN — CEFTRIAXONE SODIUM 1000 MG: 1 INJECTION, POWDER, FOR SOLUTION INTRAMUSCULAR; INTRAVENOUS at 13:42

## 2020-07-01 RX ADMIN — METOPROLOL TARTRATE 25 MG: 25 TABLET, FILM COATED ORAL at 21:26

## 2020-07-01 RX ADMIN — ATORVASTATIN CALCIUM 40 MG: 40 TABLET, FILM COATED ORAL at 17:02

## 2020-07-01 RX ADMIN — DOXYCYCLINE 100 MG: 100 CAPSULE ORAL at 23:19

## 2020-07-01 RX ADMIN — MICONAZOLE NITRATE 1 APPLICATION: 20 CREAM TOPICAL at 08:30

## 2020-07-01 RX ADMIN — MELATONIN 2000 UNITS: at 08:14

## 2020-07-01 RX ADMIN — DOXYCYCLINE 100 MG: 100 CAPSULE ORAL at 11:59

## 2020-07-01 RX ADMIN — ACYCLOVIR 400 MG: 200 CAPSULE ORAL at 17:02

## 2020-07-01 RX ADMIN — ZINC SULFATE 220 MG (50 MG) CAPSULE 220 MG: CAPSULE at 08:14

## 2020-07-01 RX ADMIN — MICONAZOLE NITRATE 1 APPLICATION: 20 CREAM TOPICAL at 17:05

## 2020-07-01 RX ADMIN — WARFARIN SODIUM 5 MG: 5 TABLET ORAL at 17:03

## 2020-07-01 RX ADMIN — FLUTICASONE FUROATE AND VILANTEROL TRIFENATATE 1 PUFF: 200; 25 POWDER RESPIRATORY (INHALATION) at 08:13

## 2020-07-01 RX ADMIN — MELATONIN 6 MG: at 21:27

## 2020-07-01 RX ADMIN — OXYCODONE HYDROCHLORIDE AND ACETAMINOPHEN 1000 MG: 500 TABLET ORAL at 08:14

## 2020-07-01 RX ADMIN — TORSEMIDE 20 MG: 20 TABLET ORAL at 08:14

## 2020-07-01 RX ADMIN — MICONAZOLE NITRATE 1 APPLICATION: 20 CREAM TOPICAL at 21:27

## 2020-07-01 NOTE — OCCUPATIONAL THERAPY NOTE
OccupationalTherapy Progress Note     Patient Name: Charly Bal  DFKYC'L Date: 7/1/2020  Problem List  Principal Problem:    Acute respiratory failure with hypoxia due to Pneumonia due to COVID-19 virus  Active Problems:    Diabetes mellitus type 2, uncontrolled (HCC)    Essential hypertension    Chronic atrial fibrillation (HCC)    Chronic diastolic congestive heart failure (HCC)    Acute renal failure (ARF) (HCC)    Type 2 acute myocardial infarction [facility])    Chronic obstructive pulmonary disease, unspecified (HCC)    CKD (chronic kidney disease)    Sepsis due to COVID-19 pneumonia ([facility] [address] )       07/01/20 1050   Restrictions/Precautions   Weight Bearing Precautions Per Order No   Other Precautions Contact/isolation; Airborne/isolation;Droplet precautions;Multiple lines;Telemetry; Fall Risk;O2;Cognitive; Chair Alarm; Bed Alarm  (+2L 02 COVID-19 confirmed)   Lifestyle   Autonomy Assistance with ADL's/IADL's, +RW with transfers to w/c, w/c dependent mobility   Reciprocal Relationships facility   Service to Others retired   Intrinsic Gratification bin   Pain Assessment   Pain Assessment Tool Pain Assessment not indicated - pt denies pain   Pain Score No Pain   ADL   Where Assessed Edge of bed   Eating Assistance 7  Independent   Grooming Assistance 5  Supervision/Setup   Grooming Deficit Setup; Wash/dry hands; simulated Brushing hair   LB Dressing Assistance 2  Maximal Assistance   LB Dressing Deficit Don/doff R sock; Don/doff L sock   Toileting Assistance  5  Supervision/Setup   Toileting Deficit Setup;Use of bedpan/urinal setup   Bed Mobility   Supine to Sit 4  Minimal assistance   Additional items Assist x 1; Increased time required;Verbal cues  (peformed long sitting with Superivision and min a to pivot and center self on bed to initiate task)   Sit to Supine 5  Supervision  (S sit to supine, verbal cues and assistance to reposition )   Additional Comments Pt sat EOB for proximally 30 minutes and Supervision with static/dynamic sitting balance while performing UB strengthening and balance challenging, core strengthening/stability exercises with forward/lateral reaching  Transfers   Sit to Stand 2  Maximal assistance   Additional items Assist x 2   Stand to Sit 2  Maximal assistance   Additional items Assist x 2   Sliding Board transfer   (pt declined transfer this am )   Additional Comments Pt attempted sit to stand transfers 2x with verbal/tactile cues for safe hand placement on bed/RW and was able to achieve 75% of upright stance with <10 seconds standing tolerance using RW  Functional Mobility   Additional Comments unable to assess   Neuromuscular Education   Trunk Control Supervision during neuromusclar exercises pt able to maintain balance with reaching exercises (slightly) out of sitting base of suppport  Cognition   Overall Cognitive Status Impaired   Arousal/Participation Alert; Responsive   Attention Attends with cues to redirect   Orientation Level Oriented X4   Memory Decreased recall of precautions;Decreased recall of recent events   Following Commands Follows one step commands with increased time or repetition   Comments Pt expressed frustration with progress with mobility tasks stating "I feel so weak", explaination of therapy process and encouragement given, pt demonstrated delayed processing, decreased task initation, verbal cues to sequence transfer steps and overall safety awareness     Activity Tolerance   Activity Tolerance Patient limited by fatigue  (+2L 02 SP02 91-94%)   Medical Staff Made Aware RN cleared pt for therapy   Assessment   Assessment Patient participated in Skilled OT session this date with interventions consisting of functional transfers, sitting balance, tolerance, self care tasks, UB strengthening exercises-including B/L shoulder and elbow flex/extension, finger to thumb sequencing at with shoulder elevation, scapular retraction exercises to improve breathing techique -see assistance levels above   Patient agreeable to OT treatment session, upon arrival patient was found supine in bed  In comparison to previous session, patient with improvements in sitting tolerance   Patient requiring verbal cues for correct technique, one step directives and frequent rest periods  Patient continues to be functioning below baseline level, occupational performance remains limited secondary to factors listed above and increased risk for falls and injury  From OT standpoint, recommendation at time of d/c would be Short Term Rehab  Patient to benefit from continued Occupational Therapy treatment while in the hospital to address deficits as defined above and maximize level of functional independence with ADLs and functional mobility  Plan   Treatment Interventions ADL retraining;Functional transfer training;UE strengthening/ROM; Endurance training;Cognitive reorientation;Patient/family training;Equipment evaluation/education; Compensatory technique education; Activityengagement; Energy conservation   Goal Expiration Date 07/13/20   OT Treatment Day 1   OT Frequency 3-5x/wk   Recommendation   OT Discharge Recommendation Post-Acute Rehabilitation Services   OT - OK to Discharge Yes      Rene Blum MOT, OTR/L

## 2020-07-01 NOTE — PLAN OF CARE
Problem: PAIN - ADULT  Goal: Verbalizes/displays adequate comfort level or baseline comfort level  Description  Interventions:  - Encourage patient to monitor pain and request assistance  - Assess pain using appropriate pain scale  - Administer analgesics based on type and severity of pain and evaluate response  - Implement non-pharmacological measures as appropriate and evaluate response  - Consider cultural and social influences on pain and pain management  - Notify physician/advanced practitioner if interventions unsuccessful or patient reports new pain  Outcome: Progressing     Problem: INFECTION - ADULT  Goal: Absence or prevention of progression during hospitalization  Description  INTERVENTIONS:  - Assess and monitor for signs and symptoms of infection  - Monitor lab/diagnostic results  - Monitor all insertion sites, i e  indwelling lines, tubes, and drains  - Monitor endotracheal if appropriate and nasal secretions for changes in amount and color  - [place] appropriate cooling/warming therapies per order  - Administer medications as ordered  - Instruct and encourage patient and family to use good hand hygiene technique  - Identify and instruct in appropriate isolation precautions for identified infection/condition  Outcome: Progressing  Goal: Absence of fever/infection during neutropenic period  Description  INTERVENTIONS:  - Monitor WBC    Outcome: Progressing     Problem: SAFETY ADULT  Goal: Patient will remain free of falls  Description  INTERVENTIONS:  - Assess patient frequently for physical needs  -  Identify cognitive and physical deficits and behaviors that affect risk of falls    -  [place] fall precautions as indicated by assessment   - Educate patient/family on patient safety including physical limitations  - Instruct patient to call for assistance with activity based on assessment  - Modify environment to reduce risk of injury  - Consider OT/PT consult to assist with strengthening/mobility  Outcome: Progressing  Goal: Maintain or return to baseline ADL function  Description  INTERVENTIONS:  -  Assess patient's ability to carry out ADLs; assess patient's baseline for ADL function and identify physical deficits which impact ability to perform ADLs (bathing, care of mouth/teeth, toileting, grooming, dressing, etc )  - Assess/evaluate cause of self-care deficits   - Assess range of motion  - Assess patient's mobility; develop plan if impaired  - Assess patient's need for assistive devices and provide as appropriate  - Encourage maximum independence but intervene and supervise when necessary  - Involve family in performance of ADLs  - Assess for home care needs following discharge   - Consider OT consult to assist with ADL evaluation and planning for discharge  - Provide patient education as appropriate  Outcome: Progressing  Goal: Maintain or return mobility status to optimal level  Description  INTERVENTIONS:  - Assess patient's baseline mobility status (ambulation, transfers, stairs, etc )    - Identify cognitive and physical deficits and behaviors that affect mobility  - Identify mobility aids required to assist with transfers and/or ambulation (gait belt, sit-to-stand, lift, walker, cane, etc )  - [place] fall precautions as indicated by assessment  - Record patient progress and toleration of activity level on Mobility SBAR; progress patient to next Phase/Stage  - Instruct patient to call for assistance with activity based on assessment  - Consider rehabilitation consult to assist with strengthening/weightbearing, etc   Outcome: Progressing     Problem: Potential for Falls  Goal: Patient will remain free of falls  Description  INTERVENTIONS:  - Assess patient frequently for physical needs  -  Identify cognitive and physical deficits and behaviors that affect risk of falls    -  [place] fall precautions as indicated by assessment   - Educate patient/family on patient safety including physical limitations  - Instruct patient to call for assistance with activity based on assessment  - Modify environment to reduce risk of injury  - Consider OT/PT consult to assist with strengthening/mobility  Outcome: Progressing     Problem: Prexisting or High Potential for Compromised Skin Integrity  Goal: Skin integrity is maintained or improved  Description  INTERVENTIONS:  - Identify patients at risk for skin breakdown  - Assess and monitor skin integrity  - Assess and monitor nutrition and hydration status  - Monitor labs   - Assess for incontinence   - Turn and reposition patient  - Assist with mobility/ambulation  - Relieve pressure over bony prominences  - Avoid friction and shearing  - Provide appropriate hygiene as needed including keeping skin clean and dry  - Evaluate need for skin moisturizer/barrier cream  - Collaborate with interdisciplinary team   - Patient/family teaching  - Consider wound care consult   Outcome: Not Progressing

## 2020-07-01 NOTE — ASSESSMENT & PLAN NOTE
· Pt presents with 1 week symptoms at [facility]  · Currently receiving COVID moderate treatment algorithm  · Continue ceftriaxone and doxycycline, given elevated procalcitonin, continue to trend  · Continue vitamin-C, vitamin-D, zinc supplementation  · Monitor temps, WBC, procalcitonin, inflammatory markers  · Awaiting L 6

## 2020-07-01 NOTE — PHYSICAL THERAPY NOTE
Physical Therapy Treatment Note    Patient's Name: Christiano Holt  : 20 1133   Pain Assessment   Pain Assessment Tool Pain Assessment not indicated - pt denies pain   Pain Score No Pain   Restrictions/Precautions   Weight Bearing Precautions Per Order No   Other Precautions Airborne/isolation;Contact/isolation;Cognitive; Chair Alarm; Bed Alarm; Fall Risk;O2  (COVID-19 positive, 2L O2)   General   Chart Reviewed Yes   Additional Pertinent History Per CM discussion with SVM, pt independent with transfers with RW, independent with most ADL's at baseline  Awaiting prosthesis arrival, has been attending OPPT for pre-prosthetic training  Family/Caregiver Present No   Cognition   Overall Cognitive Status Impaired   Arousal/Participation Alert; Cooperative   Attention Attends with cues to redirect   Orientation Level Oriented X4   Memory Decreased recall of recent events;Decreased recall of precautions   Following Commands Follows one step commands with increased time or repetition   Comments Pt with depressed affect upon arrival, frustrated with functional status and inability to obtain prosthesis   Bed Mobility   Supine to Sit 4  Minimal assistance   Additional items Assist x 1; Increased time required;Verbal cues   Sit to Supine 5  Supervision   Additional items Increased time required;Verbal cues   Additional Comments Pt able to sit EOB for approximately 30 minutes with close supervision, able to weight shift with cues   Transfers   Sit to Stand 2  Maximal assistance   Additional items Assist x 2; Increased time required;Verbal cues   Stand to Sit 2  Maximal assistance   Additional items Assist x 2; Increased time required;Verbal cues   Additional Comments Sit to stand attempted x2 trials, VC's for hand placement, R LE placement, anterior weight shift  Able to achieve approximately 75% standing posture     Balance   Static Sitting Fair Dynamic Sitting Poor +   Static Standing Zero   Activity Tolerance   Activity Tolerance Patient limited by fatigue   Medical Staff Made Aware OT, Randi   Nurse Made Aware RN updated  Bed alarm intact, PCA present at end of session  Exercises   Hip Flexion Supine;10 reps;AROM; Left   Hip Abduction Supine;10 reps;AROM; Left   Knee AROM Long Arc Quad Sitting;10 reps;AROM; Right  (x2 sets)   UE Exercise Sitting;5 reps;AROM; Bilateral  (B/L clasp : forward reach to target, Monegasque twists)   Marching Sitting;5 reps;AROM; Right  (x2 sets)   Balance training  Pulmonary exercise: Performed shoulder flexion 2x5, clasped hands behind head x6, coordination of movement with sequencing of deep breathing, cues for breath control, pacing  O2 levels 91-94% on 2L with activity  Assessment   Prognosis Good   Problem List Decreased strength;Decreased mobility; Decreased endurance; Impaired balance;Decreased cognition;Decreased safety awareness; Obesity   Assessment Pt able to achieve 75% of full stand this session with RW, maxA +2, good sitting balance at EOB  Pt initially with depressed affect regarding inability to continue prosthesis training and inability to ambulate to bathroom, improved mood with exercise  Pt with stable vitals on 2L O2 during session, requires rest breaks due to fatigue  Pt refused OOB time stating "I don't want to be stuck in the chair like last time" speaking of extended period up OOB  Pt able to perform exercise to improve LE strength and trunk strength with good tolerance  Pt will benefit from continued skilled PT intervention during course of hospital stay to improve strength, endurance and balance to improve safety with functional mobility  Recommend IP rehab upon hospital D/C, given new information from facility that patient was mostly independent with mobility prior to admission      Goals   Patient Goals "get out of here"   STG Expiration Date 07/09/20   PT Treatment Day 1   Plan Treatment/Interventions Functional transfer training;LE strengthening/ROM; Therapeutic exercise; Endurance training;Cognitive reorientation;Patient/family training;Equipment eval/education; Bed mobility   Progress Progressing toward goals   PT Frequency Other (Comment)  (3-5x/week (increased due to new PLOF information))   Recommendation   PT Discharge Recommendation Post-Acute Rehabilitation Services   PT - OK to Discharge Yes  (to IP rehab)       Cj Brady, PT, DPT

## 2020-07-01 NOTE — ASSESSMENT & PLAN NOTE
Lab Results   Component Value Date    HGBA1C 8 8 (H) 06/25/2020       Recent Labs     06/30/20  1600 06/30/20  2126 07/01/20  0733 07/01/20  1133   POCGLU 187* 207* 112 155*       Blood Sugar Average: Last 72 hrs:  (P) 172 [id number]   · Uncontrolled due to IV steroid use  · Improved today  · Diabetic diet  · Will decrease insulin today as the patient had some mild hypoglycemia  · Continue sliding scale coverage

## 2020-07-01 NOTE — ASSESSMENT & PLAN NOTE
Wt Readings from Last 3 Encounters:   07/01/20 (!) 154 kg (339 lb 8 oz)   06/15/20 (!) 155 kg (341 lb 4 4 oz)   06/08/20 (!) 154 kg (340 lb 9 8 oz)     · Volume status is adequate  · Monitor intake and output  · Monitor daily weights  · Will hold torsemide tomorrow due to increased creatinine

## 2020-07-01 NOTE — PLAN OF CARE
Problem: OCCUPATIONAL THERAPY ADULT  Goal: Performs self-care activities at highest level of function for planned discharge setting  See evaluation for individualized goals  Description  Treatment Interventions: ADL retraining, Functional transfer training, UE strengthening/ROM, Endurance training, Cognitive reorientation, Patient/family training, Equipment evaluation/education, Compensatory technique education, Activityengagement, Energy conservation          See flowsheet documentation for full assessment, interventions and recommendations  Note:   Limitation: Decreased ADL status, Decreased endurance, Decreased cognition, Decreased Safe judgement during ADL, Decreased self-care trans, Decreased high-level ADLs  Prognosis: Fair  Assessment: Patient participated in Skilled OT session this date with interventions consisting of functional transfers, sitting balance, tolerance, self care tasks, UB strengthening exercises -see assistance levels above   Patient agreeable to OT treatment session, upon arrival patient was found supine in bed  In comparison to previous session, patient with improvements in sitting tolerance   Patient requiring verbal cues for correct technique, one step directives and frequent rest periods  Patient continues to be functioning below baseline level, occupational performance remains limited secondary to factors listed above and increased risk for falls and injury  From OT standpoint, recommendation at time of d/c would be Short Term Rehab  Patient to benefit from continued Occupational Therapy treatment while in the hospital to address deficits as defined above and maximize level of functional independence with ADLs and functional mobility  OT Discharge Recommendation: Post-Acute Rehabilitation Services  OT - OK to Discharge:  Yes

## 2020-07-01 NOTE — SOCIAL WORK
Per Tristen at [facility] patient will need STR prior to returning if he needs any hands on assistance

## 2020-07-01 NOTE — PLAN OF CARE
Problem: PAIN - ADULT  Goal: Verbalizes/displays adequate comfort level or baseline comfort level  Description  Interventions:  - Encourage patient to monitor pain and request assistance  - Assess pain using appropriate pain scale  - Administer analgesics based on type and severity of pain and evaluate response  - Implement non-pharmacological measures as appropriate and evaluate response  - Consider cultural and social influences on pain and pain management  - Notify physician/advanced practitioner if interventions unsuccessful or patient reports new pain  Outcome: Progressing     Problem: INFECTION - ADULT  Goal: Absence or prevention of progression during hospitalization  Description  INTERVENTIONS:  - Assess and monitor for signs and symptoms of infection  - Monitor lab/diagnostic results  - Monitor all insertion sites, i e  indwelling lines, tubes, and drains  - Monitor endotracheal if appropriate and nasal secretions for changes in amount and color  - [place] appropriate cooling/warming therapies per order  - Administer medications as ordered  - Instruct and encourage patient and family to use good hand hygiene technique  - Identify and instruct in appropriate isolation precautions for identified infection/condition  Outcome: Progressing  Goal: Absence of fever/infection during neutropenic period  Description  INTERVENTIONS:  - Monitor WBC    Outcome: Progressing     Problem: SAFETY ADULT  Goal: Patient will remain free of falls  Description  INTERVENTIONS:  - Assess patient frequently for physical needs  -  Identify cognitive and physical deficits and behaviors that affect risk of falls    -  [place] fall precautions as indicated by assessment   - Educate patient/family on patient safety including physical limitations  - Instruct patient to call for assistance with activity based on assessment  - Modify environment to reduce risk of injury  - Consider OT/PT consult to assist with strengthening/mobility  Outcome: Progressing  Goal: Maintain or return to baseline ADL function  Description  INTERVENTIONS:  -  Assess patient's ability to carry out ADLs; assess patient's baseline for ADL function and identify physical deficits which impact ability to perform ADLs (bathing, care of mouth/teeth, toileting, grooming, dressing, etc )  - Assess/evaluate cause of self-care deficits   - Assess range of motion  - Assess patient's mobility; develop plan if impaired  - Assess patient's need for assistive devices and provide as appropriate  - Encourage maximum independence but intervene and supervise when necessary  - Involve family in performance of ADLs  - Assess for home care needs following discharge   - Consider OT consult to assist with ADL evaluation and planning for discharge  - Provide patient education as appropriate  Outcome: Progressing  Goal: Maintain or return mobility status to optimal level  Description  INTERVENTIONS:  - Assess patient's baseline mobility status (ambulation, transfers, stairs, etc )    - Identify cognitive and physical deficits and behaviors that affect mobility  - Identify mobility aids required to assist with transfers and/or ambulation (gait belt, sit-to-stand, lift, walker, cane, etc )  - [place] fall precautions as indicated by assessment  - Record patient progress and toleration of activity level on Mobility SBAR; progress patient to next Phase/Stage  - Instruct patient to call for assistance with activity based on assessment  - Consider rehabilitation consult to assist with strengthening/weightbearing, etc   Outcome: Progressing     Problem: Prexisting or High Potential for Compromised Skin Integrity  Goal: Skin integrity is maintained or improved  Description  INTERVENTIONS:  - Identify patients at risk for skin breakdown  - Assess and monitor skin integrity  - Assess and monitor nutrition and hydration status  - Monitor labs   - Assess for incontinence   - Turn and reposition patient  - Assist with mobility/ambulation  - Relieve pressure over bony prominences  - Avoid friction and shearing  - Provide appropriate hygiene as needed including keeping skin clean and dry  - Evaluate need for skin moisturizer/barrier cream  - Collaborate with interdisciplinary team   - Patient/family teaching  - Consider wound care consult   Outcome: Progressing     Problem: Potential for Falls  Goal: Patient will remain free of falls  Description  INTERVENTIONS:  - Assess patient frequently for physical needs  -  Identify cognitive and physical deficits and behaviors that affect risk of falls    -  [place] fall precautions as indicated by assessment   - Educate patient/family on patient safety including physical limitations  - Instruct patient to call for assistance with activity based on assessment  - Modify environment to reduce risk of injury  - Consider OT/PT consult to assist with strengthening/mobility  Outcome: Progressing

## 2020-07-01 NOTE — PROGRESS NOTES
Progress Note - Jaydon Bender 1950, 81 y o  male MRN: [id number]    Unit/Bed#: [facility] 821-01 Encounter: [id number]    Primary Care Provider: Cleo Webb MD   Date and time admitted to hospital: 6/25/2020 11:43 AM        Sepsis due to COVID-19 pneumonia [facility])  Assessment & Plan  · Patient presenting with tachycardia, tachypnea, hypoxia and confirm COVID-19 pneumonia  · Elevated procalcitonin  Continue antibiotics as above  · Blood cultures negative so far    CKD (chronic kidney disease)  Assessment & Plan  · Baseline creatinine around 1 2   · Management as above    Chronic obstructive pulmonary disease, unspecified ([facility] [address] )  Assessment & Plan  · Stable without any wheezing  · Continue home inhalers  Type 2 acute myocardial infarction [facility])  Assessment & Plan  · Likely due to acute respiratory failure from COVID-19 pneumonia  · Patient denies any chest pain or shortness of breath  · Will continue warfarin for anticoagulation given his lack of symptoms at this time  · Outpatient cardiac evaluation when stable    Acute renal failure (ARF) (HCC)  Assessment & Plan  · Acute kidney failure likely due to Sepsis evidenced by Cr 1 73 (baseline Cr 1 2)  · Creatinine slightly increased today, possibly due to fever, encouraged patient to increase fluid intake  · Avoiding IVF due to his history of CHF  · Avoid nephrotoxins, hypotension    · Monitor BMP    Chronic diastolic congestive heart failure (HCC)  Assessment & Plan  Wt Readings from Last 3 Encounters:   07/01/20 (!) 154 kg (339 lb 8 oz)   06/15/20 (!) 155 kg (341 lb 4 4 oz)   06/08/20 (!) 154 kg (340 lb 9 8 oz)     · Volume status is adequate  · Monitor intake and output  · Monitor daily weights  · Will hold torsemide tomorrow due to increased creatinine    Chronic atrial fibrillation (HCC)  Assessment & Plan  · Rate controlled with metoprolol  · Warfarin for anticoagulation  · INR is increasing, likely secondary to antibiotics, check iNR in the morning    Essential hypertension  Assessment & Plan  · Well controlled  · Continue metoprolol   · Holding torsemide tomorrow due to increased creatinine today    Diabetes mellitus type 2, uncontrolled ([facility] [address] )  Assessment & Plan  Lab Results   Component Value Date    HGBA1C 8 8 (H) 2020       Recent Labs     20  1600 20  2126 20  0733 20  1133   POCGLU 187* 207* 112 155*       Blood Sugar Average: Last 72 hrs:  (P) 172 [id number]   · Uncontrolled due to IV steroid use  · Improved today  · Diabetic diet  · Will decrease insulin today as the patient had some mild hypoglycemia  · Continue sliding scale coverage    * Acute respiratory failure with hypoxia due to Pneumonia due to COVID-19 virus  Assessment & Plan  · Pt presents with 1 week symptoms at [facility]  · Currently receiving COVID moderate treatment algorithm  · Continue ceftriaxone and doxycycline, given elevated procalcitonin, continue to trend  · Continue vitamin-C, vitamin-D, zinc supplementation  · Monitor temps, WBC, procalcitonin, inflammatory markers  · Awaiting L 6        VTE Pharmacologic Prophylaxis:   Pharmacologic: Warfarin (Coumadin)  Mechanical VTE Prophylaxis in Place: Yes    Patient Centered Rounds: I have performed bedside rounds with nursing staff today  Discussions with Specialists or Other Care Team Provider:      Education and Discussions with Family / Patient: patient and family     Time Spent for Care: 45 minutes  More than 50% of total time spent on counseling and coordination of care as described above  Current Length of Stay: 6 day(s)    Current Patient Status: Inpatient   Certification Statement: The patient will continue to require additional inpatient hospital stay due to covid 19     Discharge Plan: patient needs to improve  Code Status: Level 1 - Full Code      Subjective:   Patient is doing well       Objective:     Vitals:   Temp (24hrs), Av 1 °F (36 7 °C), Min:98 °F (36 7 °C), Max:98 1 °F (36 7 °C)    Temp:  [98 °F (36 7 °C)-98 1 °F (36 7 °C)] 98 1 °F (36 7 °C)  HR:  [50-76] 50  Resp:  [20-22] 22  BP: (131-135)/(56-77) 135/56  SpO2:  [94 %-96 %] 95 %  Body mass index is 42 43 kg/m²  Input and Output Summary (last 24 hours): Intake/Output Summary (Last 24 hours) at 7/1/2020 1521  Last data filed at 7/1/2020 1440  Gross per 24 hour   Intake 560 ml   Output 1750 ml   Net -1190 ml       Physical Exam:     Physical Exam   Constitutional: He is oriented to person, place, and time  He appears well-developed and well-nourished  No distress  HENT:   Head: Normocephalic and atraumatic  Right Ear: External ear normal    Left Ear: External ear normal    Nose: Nose normal    Mouth/Throat: Oropharynx is clear and moist  No oropharyngeal exudate  Eyes: Pupils are equal, round, and reactive to light  Conjunctivae and EOM are normal    Neck: Normal range of motion  Neck supple  Cardiovascular: Normal rate, regular rhythm, normal heart sounds and intact distal pulses  Exam reveals no gallop and no friction rub  No murmur heard  Pulmonary/Chest: Effort normal and breath sounds normal  No stridor  No respiratory distress  Abdominal: Soft  Bowel sounds are normal  He exhibits no distension  There is no tenderness  Musculoskeletal: Normal range of motion  He exhibits no edema, tenderness or deformity  Neurological: He is alert and oriented to person, place, and time  He displays normal reflexes  No cranial nerve deficit  He exhibits normal muscle tone  Coordination normal    Skin: Skin is warm and dry  No rash noted  He is not diaphoretic  No erythema  No pallor  Psychiatric: He has a normal mood and affect  His behavior is normal  Judgment and thought content normal    Nursing note and vitals reviewed        Additional Data:     Labs:    Results from last 7 days   Lab Units 07/01/20  0558   WBC Thousand/uL 5 05   HEMOGLOBIN g/dL 11 2*   HEMATOCRIT % 37 6   PLATELETS Thousands/uL 204   NEUTROS PCT % 90*   LYMPHS PCT % 6*   MONOS PCT % 3*   EOS PCT % 0     Results from last 7 days   Lab Units 07/01/20  0558 06/30/20  0559   POTASSIUM mmol/L 4 0 4 0   CHLORIDE mmol/L 105 106   CO2 mmol/L 25 28   BUN mg/dL 76* 76*   CREATININE mg/dL 1 29 1 46*   CALCIUM mg/dL 8 7 9 1   ALK PHOS U/L  --  136*   ALT U/L  --  40   AST U/L  --  55*     Results from last 7 days   Lab Units 06/29/20  0558   INR  2 74*       * I Have Reviewed All Lab Data Listed Above  * Additional Pertinent Lab Tests Reviewed: [address] Admission Reviewed    Imaging:    Imaging Reports Reviewed Today Include:    Imaging Personally Reviewed by Myself Includes:       Recent Cultures (last 7 days):     Results from last 7 days   Lab Units 06/25/20  1206   BLOOD CULTURE  No Growth After 5 Days  No Growth After 5 Days         Last 24 Hours Medication List:     Current Facility-Administered Medications:  acetaminophen 650 mg Oral Q6H PRN Ivanna Cheung MD    acyclovir 400 mg Oral After Lizbet Rothman MD    albuterol 2 puff Inhalation Q6H PRN Ivanna Cheung MD    ascorbic acid 1,000 mg Oral Q12H [address] Ivanna Cheung MD    atorvastatin 40 mg Oral After Lizbet Rothman MD    bisacodyl 10 mg Rectal Daily PRN Ivanna Cheung MD    cefTRIAXone 1,000 mg Intravenous Q24H Ivanna Cheung MD Last Rate: 1,000 mg (07/01/20 1342)   cholecalciferol 2,000 Units Oral Daily Ivanna Cheugn MD    clotrimazole  Topical Q12H Jessica Coy MD    doxycycline hyclate 100 mg Oral Q12H Ivanna Cheung MD    fluticasone-vilanterol 1 puff Inhalation Daily Ivanna Cheung MD    insulin glargine 35 Units Subcutaneous HS Gwen Myers MD    insulin lispro 1-5 Units Subcutaneous HS Ramin Driscoll PA-C    insulin lispro 12 Units Subcutaneous TID With Meals Gwen Myers MD    insulin lispro 2-12 Units Subcutaneous TID AC Ivanna Cheung MD    magnesium hydroxide 30 mL Oral Daily PRN Doris Hinton MD    melatonin 6 mg Oral HS Tuesday M NICOLA Heath    methylPREDNISolone sodium succinate 125 mg Intravenous Daily Doris Hinton MD    metoprolol tartrate 25 mg Oral Q12H Doris Hinton MD    Clune Dura ANTIFUNGAL 1 application Topical TID MD Shan Segovia ON 7/2/2020] multivitamin-minerals 1 tablet Oral Daily Doris Hinton MD    torsemide 20 mg Oral BID (diuretic) Antwon Boss MD    warfarin 5 mg Oral Daily (warfarin) Antwon Boss MD         Today, Patient Was Seen By: Kush Storm MD    ** Please Note: Dictation voice to text software may have been used in the creation of this document   **

## 2020-07-02 LAB
ALBUMIN SERPL BCP-MCNC: 2.4 G/DL (ref 3.5–5)
ALP SERPL-CCNC: 179 U/L (ref 46–116)
ALT SERPL W P-5'-P-CCNC: 48 U/L (ref 12–78)
ANION GAP SERPL CALCULATED.3IONS-SCNC: 4 MMOL/L (ref 4–13)
AST SERPL W P-5'-P-CCNC: 52 U/L (ref 5–45)
BASOPHILS # BLD MANUAL: 0 THOUSAND/UL (ref 0–0.1)
BASOPHILS NFR MAR MANUAL: 0 % (ref 0–1)
BILIRUB SERPL-MCNC: 0.53 MG/DL (ref 0.2–1)
BUN SERPL-MCNC: 75 MG/DL (ref 5–25)
CALCIUM SERPL-MCNC: 9.1 MG/DL (ref 8.3–10.1)
CHLORIDE SERPL-SCNC: 106 MMOL/L (ref 100–108)
CO2 SERPL-SCNC: 33 MMOL/L (ref 21–32)
CREAT SERPL-MCNC: 1.28 MG/DL (ref 0.6–1.3)
CRP SERPL QL: 45.4 MG/L
D DIMER PPP FEU-MCNC: 0.75 UG/ML FEU
EOSINOPHIL # BLD MANUAL: 0 THOUSAND/UL (ref 0–0.4)
EOSINOPHIL NFR BLD MANUAL: 0 % (ref 0–6)
ERYTHROCYTE [DISTWIDTH] IN BLOOD BY AUTOMATED COUNT: 15.4 % (ref 11.6–15.1)
GFR SERPL CREATININE-BSD FRML MDRD: 58 ML/MIN/1.73SQ M
GLUCOSE SERPL-MCNC: 194 MG/DL (ref 65–140)
GLUCOSE SERPL-MCNC: 208 MG/DL (ref 65–140)
GLUCOSE SERPL-MCNC: 254 MG/DL (ref 65–140)
GLUCOSE SERPL-MCNC: 83 MG/DL (ref 65–140)
GLUCOSE SERPL-MCNC: 95 MG/DL (ref 65–140)
HCT VFR BLD AUTO: 36.5 % (ref 36.5–49.3)
HGB BLD-MCNC: 11.1 G/DL (ref 12–17)
LYMPHOCYTES # BLD AUTO: 0.04 THOUSAND/UL (ref 0.6–4.47)
LYMPHOCYTES # BLD AUTO: 1 % (ref 14–44)
MCH RBC QN AUTO: 27.5 PG (ref 26.8–34.3)
MCHC RBC AUTO-ENTMCNC: 30.4 G/DL (ref 31.4–37.4)
MCV RBC AUTO: 91 FL (ref 82–98)
MONOCYTES # BLD AUTO: 0.13 THOUSAND/UL (ref 0–1.22)
MONOCYTES NFR BLD: 3 % (ref 4–12)
NEUTROPHILS # BLD MANUAL: 3.99 THOUSAND/UL (ref 1.85–7.62)
NEUTS SEG NFR BLD AUTO: 91 % (ref 43–75)
NRBC BLD AUTO-RTO: 1 /100 WBCS
PLATELET # BLD AUTO: 220 THOUSANDS/UL (ref 149–390)
PLATELET BLD QL SMEAR: ADEQUATE
PMV BLD AUTO: 10.9 FL (ref 8.9–12.7)
POLYCHROMASIA BLD QL SMEAR: PRESENT
POTASSIUM SERPL-SCNC: 3.8 MMOL/L (ref 3.5–5.3)
PROT SERPL-MCNC: 6.2 G/DL (ref 6.4–8.2)
RBC # BLD AUTO: 4.03 MILLION/UL (ref 3.88–5.62)
RBC MORPH BLD: PRESENT
SODIUM SERPL-SCNC: 143 MMOL/L (ref 136–145)
VARIANT LYMPHS # BLD AUTO: 5 %
WBC # BLD AUTO: 4.38 THOUSAND/UL (ref 4.31–10.16)

## 2020-07-02 PROCEDURE — 85379 FIBRIN DEGRADATION QUANT: CPT | Performed by: INTERNAL MEDICINE

## 2020-07-02 PROCEDURE — 85027 COMPLETE CBC AUTOMATED: CPT | Performed by: INTERNAL MEDICINE

## 2020-07-02 PROCEDURE — 99233 SBSQ HOSP IP/OBS HIGH 50: CPT | Performed by: INTERNAL MEDICINE

## 2020-07-02 PROCEDURE — 82948 REAGENT STRIP/BLOOD GLUCOSE: CPT

## 2020-07-02 PROCEDURE — 85007 BL SMEAR W/DIFF WBC COUNT: CPT | Performed by: INTERNAL MEDICINE

## 2020-07-02 PROCEDURE — 80053 COMPREHEN METABOLIC PANEL: CPT | Performed by: INTERNAL MEDICINE

## 2020-07-02 PROCEDURE — 86140 C-REACTIVE PROTEIN: CPT | Performed by: INTERNAL MEDICINE

## 2020-07-02 RX ADMIN — MELATONIN 6 MG: at 22:16

## 2020-07-02 RX ADMIN — METOPROLOL TARTRATE 25 MG: 25 TABLET, FILM COATED ORAL at 20:23

## 2020-07-02 RX ADMIN — FLUTICASONE FUROATE AND VILANTEROL TRIFENATATE 1 PUFF: 200; 25 POWDER RESPIRATORY (INHALATION) at 08:24

## 2020-07-02 RX ADMIN — ACYCLOVIR 400 MG: 200 CAPSULE ORAL at 18:14

## 2020-07-02 RX ADMIN — TORSEMIDE 20 MG: 20 TABLET ORAL at 18:12

## 2020-07-02 RX ADMIN — CLOTRIMAZOLE 1 APPLICATION: 10 CREAM TOPICAL at 20:25

## 2020-07-02 RX ADMIN — MICONAZOLE NITRATE 1 APPLICATION: 20 CREAM TOPICAL at 18:13

## 2020-07-02 RX ADMIN — INSULIN LISPRO 2 UNITS: 100 INJECTION, SOLUTION INTRAVENOUS; SUBCUTANEOUS at 22:17

## 2020-07-02 RX ADMIN — WARFARIN SODIUM 5 MG: 5 TABLET ORAL at 18:13

## 2020-07-02 RX ADMIN — ATORVASTATIN CALCIUM 40 MG: 40 TABLET, FILM COATED ORAL at 18:12

## 2020-07-02 RX ADMIN — INSULIN LISPRO 2 UNITS: 100 INJECTION, SOLUTION INTRAVENOUS; SUBCUTANEOUS at 12:26

## 2020-07-02 RX ADMIN — MICONAZOLE NITRATE 1 APPLICATION: 20 CREAM TOPICAL at 08:25

## 2020-07-02 RX ADMIN — INSULIN GLARGINE 35 UNITS: 100 INJECTION, SOLUTION SUBCUTANEOUS at 22:16

## 2020-07-02 RX ADMIN — DOXYCYCLINE 100 MG: 100 CAPSULE ORAL at 12:26

## 2020-07-02 RX ADMIN — Medication 1 TABLET: at 08:23

## 2020-07-02 RX ADMIN — TORSEMIDE 20 MG: 20 TABLET ORAL at 08:23

## 2020-07-02 RX ADMIN — METOPROLOL TARTRATE 25 MG: 25 TABLET, FILM COATED ORAL at 08:23

## 2020-07-02 RX ADMIN — CLOTRIMAZOLE: 10 CREAM TOPICAL at 08:24

## 2020-07-02 RX ADMIN — MELATONIN 2000 UNITS: at 08:23

## 2020-07-02 RX ADMIN — METHYLPREDNISOLONE SODIUM SUCCINATE 125 MG: 125 INJECTION, POWDER, FOR SOLUTION INTRAMUSCULAR; INTRAVENOUS at 08:23

## 2020-07-02 RX ADMIN — OXYCODONE HYDROCHLORIDE AND ACETAMINOPHEN 1000 MG: 500 TABLET ORAL at 08:23

## 2020-07-02 RX ADMIN — CEFTRIAXONE SODIUM 1000 MG: 1 INJECTION, POWDER, FOR SOLUTION INTRAMUSCULAR; INTRAVENOUS at 14:03

## 2020-07-02 RX ADMIN — DOXYCYCLINE 100 MG: 100 CAPSULE ORAL at 22:16

## 2020-07-02 RX ADMIN — MICONAZOLE NITRATE 1 APPLICATION: 20 CREAM TOPICAL at 20:26

## 2020-07-02 RX ADMIN — INSULIN LISPRO 4 UNITS: 100 INJECTION, SOLUTION INTRAVENOUS; SUBCUTANEOUS at 18:14

## 2020-07-02 NOTE — ASSESSMENT & PLAN NOTE
· Patient presenting with tachycardia, tachypnea, hypoxia and confirm COVID-19 pneumonia    · procalcitonin is coming down to 0 55 today, he is having no trouble breathing  · Would keep saturations between 89-92%  · Blood cultures negative    · Need

## 2020-07-02 NOTE — PROGRESS NOTES
Progress Note - Margo Patient 1950, 81 y o  male MRN: [id number]    Unit/Bed#: [facility] 821-01 Encounter: [id number]    Primary Care Provider: Aniceto Carney MD   Date and time admitted to hospital: 6/25/2020 11:43 AM        Sepsis due to COVID-19 pneumonia [facility])  Assessment & Plan  · Patient presenting with tachycardia, tachypnea, hypoxia and confirm COVID-19 pneumonia  · procalcitonin is coming down to 0 55 today, he is having no trouble breathing  · Would keep saturations between 89-92%  · Blood cultures negative    · Need    CKD (chronic kidney disease)  Assessment & Plan  · Baseline creatinine around 1 2   · Management as above    Chronic obstructive pulmonary disease, unspecified ([facility] [address] )  Assessment & Plan  · Stable without any wheezing  · Continue home inhalers  Type 2 acute myocardial infarction [facility])  Assessment & Plan  · Likely due to acute respiratory failure from COVID-19 pneumonia  · Patient denies any chest pain or shortness of breath  · Will continue warfarin for anticoagulation given his lack of symptoms at this time  · Outpatient cardiac evaluation when stable  · Hyperglycemia due to steroids    Acute renal failure (ARF) (HCC)  Assessment & Plan  · Acute kidney failure likely due to Sepsis evidenced by Cr 1 73 (baseline Cr 1 2)  · Creatinine slightly increased today, possibly due to fever, encouraged patient to increase fluid intake  · Avoiding IVF due to his history of CHF  · Avoid nephrotoxins, hypotension    · Monitor BMP    Chronic diastolic congestive heart failure [facility])  Assessment & Plan  Wt Readings from Last 3 Encounters:   07/02/20 (!) 150 kg (331 lb)   06/15/20 (!) 155 kg (341 lb 4 4 oz)   06/08/20 (!) 154 kg (340 lb 9 8 oz)     · Volume status is adequate  · Monitor intake and output  · Monitor daily weights  · Will hold torsemide tomorrow due to increased creatinine    Chronic atrial fibrillation (HCC)  Assessment & Plan  · Rate controlled with metoprolol  · Warfarin for anticoagulation  · INR is increasing, likely secondary to antibiotics, check iNR in the morning    Essential hypertension  Assessment & Plan  · Well controlled  · Continue metoprolol   · Holding torsemide tomorrow due to increased creatinine today    Diabetes mellitus type 2, uncontrolled ([facility] [address] )  Assessment & Plan  Lab Results   Component Value Date    HGBA1C 8 8 (H) 06/25/2020       Recent Labs     07/01/20  2109 07/02/20  0711 07/02/20  1055 07/02/20  1605   POCGLU 141* 95 194* 208*       Blood Sugar Average: Last 72 hrs:  (P) 164 [id number]   · Uncontrolled due to IV steroid use  · Improved today  · Diabetic diet  · Will decrease insulin today as the patient had some mild hypoglycemia  · Continue sliding scale coverage    * Acute respiratory failure with hypoxia due to Pneumonia due to COVID-19 virus  Assessment & Plan  · Pt presents with 1 week symptoms at [facility]  · Currently receiving COVID moderate treatment algorithm  · Continue ceftriaxone and doxycycline, given elevated procalcitonin, continue to trend  · Continue vitamin-C, vitamin-D, zinc supplementation  · Monitor temps, WBC, procalcitonin, inflammatory markers  · Inflammatory marker are high will continue with steroids, repeat labs in the am           VTE Pharmacologic Prophylaxis:   Pharmacologic: Warfarin (Coumadin)  Mechanical VTE Prophylaxis in Place: Yes    Patient Centered Rounds: I have performed bedside rounds with nursing staff today  Discussions with Specialists or Other Care Team Provider:      Education and Discussions with Family / Patient: patient     Time Spent for Care: 45 minutes  More than 50% of total time spent on counseling and coordination of care as described above      Current Length of Stay: 7 day(s)    Current Patient Status: Inpatient   Certification Statement: The patient will continue to require additional inpatient hospital stay due to covid    Discharge Plan: pending improvement of markers    Code Status: Level 1 - Full Code      Subjective:   Patient is doing well  Not on oxygen at home  He eating and drinking okay  Objective:     Vitals:   Temp (24hrs), Av 6 °F (36 4 °C), Min:97 5 °F (36 4 °C), Max:97 9 °F (36 6 °C)    Temp:  [97 5 °F (36 4 °C)-97 9 °F (36 6 °C)] 97 6 °F (36 4 °C)  HR:  [55-69] 68  Resp:  [20-24] 24  BP: (144-151)/(77-79) 151/79  SpO2:  [92 %-98 %] 94 %  Body mass index is 41 37 kg/m²  Input and Output Summary (last 24 hours): Intake/Output Summary (Last 24 hours) at 2020 1635  Last data filed at 2020 1629  Gross per 24 hour   Intake 986 ml   Output 2350 ml   Net -1364 ml       Physical Exam:     Physical Exam   Constitutional: He is oriented to person, place, and time  He appears well-developed and well-nourished  HENT:   Head: Normocephalic and atraumatic  Right Ear: External ear normal    Left Ear: External ear normal    Nose: Nose normal    Mouth/Throat: Oropharynx is clear and moist  No oropharyngeal exudate  Eyes: Pupils are equal, round, and reactive to light  Conjunctivae and EOM are normal  Right eye exhibits no discharge  Left eye exhibits no discharge  No scleral icterus  Neck: Normal range of motion  Neck supple  No JVD present  No tracheal deviation present  No thyromegaly present  Cardiovascular: Normal rate, regular rhythm, normal heart sounds and intact distal pulses  Exam reveals no gallop and no friction rub  No murmur heard  Pulmonary/Chest: Effort normal and breath sounds normal  No stridor  No respiratory distress  He has no wheezes  Abdominal: Soft  Bowel sounds are normal  He exhibits no distension  There is no tenderness  Musculoskeletal: Normal range of motion  He exhibits no edema, tenderness or deformity  Neurological: He is alert and oriented to person, place, and time  He displays normal reflexes  No cranial nerve deficit  He exhibits normal muscle tone  Coordination normal    Skin: Skin is warm and dry   No rash noted  No erythema  No pallor  Psychiatric: He has a normal mood and affect  His behavior is normal  Judgment and thought content normal    Nursing note and vitals reviewed  Additional Data:     Labs:    Results from last 7 days   Lab Units 07/02/20  0607 07/01/20  0558   WBC Thousand/uL 4 38 5 05   HEMOGLOBIN g/dL 11 1* 11 2*   HEMATOCRIT % 36 5 37 6   PLATELETS Thousands/uL 220 204   NEUTROS PCT %  --  90*   LYMPHS PCT %  --  6*   LYMPHO PCT % 1*  --    MONOS PCT %  --  3*   MONO PCT % 3*  --    EOS PCT % 0 0     Results from last 7 days   Lab Units 07/02/20  0607   POTASSIUM mmol/L 3 8   CHLORIDE mmol/L 106   CO2 mmol/L 33*   BUN mg/dL 75*   CREATININE mg/dL 1 28   CALCIUM mg/dL 9 1   ALK PHOS U/L 179*   ALT U/L 48   AST U/L 52*     Results from last 7 days   Lab Units 06/29/20  0558   INR  2 74*       * I Have Reviewed All Lab Data Listed Above  * Additional Pertinent Lab Tests Reviewed:  [address] Admission Reviewed    Imaging:    Imaging Reports Reviewed Today Include:    Imaging Personally Reviewed by Myself Includes:       Recent Cultures (last 7 days):           Last 24 Hours Medication List:     Current Facility-Administered Medications:  acetaminophen 650 mg Oral Q6H PRN Naima Lucia MD    acyclovir 400 mg Oral After Shanel De La Cruz MD    albuterol 2 puff Inhalation Q6H PRN aNima Lucia MD    ascorbic acid 1,000 mg Oral Q12H [facility] Naima Lucia MD    atorvastatin 40 mg Oral After Shanel De La Cruz MD    bisacodyl 10 mg Rectal Daily PRN Naima Lucia MD    cefTRIAXone 1,000 mg Intravenous Q24H Naima Lucia MD Last Rate: 1,000 mg (07/02/20 1403)   cholecalciferol 2,000 Units Oral Daily Naima Lucia MD    clotrimazole  Topical Q12H [facility] Naima Lucia MD    doxycycline hyclate 100 mg Oral Q12H Naima Lucia MD    fluticasone-vilanterol 1 puff Inhalation Daily Naima Lucia MD    insulin glargine 35 Units Subcutaneous HS Estella Elizondo MD    insulin lispro 1-5 Units Subcutaneous HS JOEY Askew    insulin lispro 12 Units Subcutaneous TID With Meals Estella Elizondo MD    insulin lispro 2-12 Units Subcutaneous TID AC Esthela Chew MD    magnesium hydroxide 30 mL Oral Daily PRN Esthela Chew MD    melatonin 6 mg Oral HS Tuesday M NICOLA Heath    methylPREDNISolone sodium succinate 125 mg Intravenous Daily Esthela Chew MD    metoprolol tartrate 25 mg Oral Q12H MD Kyle Barger ANTIFUNGAL 1 application Topical TID Estella Elizondo MD    multivitamin-minerals 1 tablet Oral Daily Esthela Chew MD    torsemide 20 mg Oral BID (diuretic) Estella Elizondo MD    warfarin 5 mg Oral Daily (warfarin) Estella Elizondo MD         Today, Patient Was Seen By: Jayce Marr MD    ** Please Note: Dictation voice to text software may have been used in the creation of this document   **

## 2020-07-02 NOTE — RESTORATIVE TECHNICIAN NOTE
Restorative Specialist Mobility Note       Activity: Other (Comment)(Sat EOB for 10 mins)              Repositioned: Sitting

## 2020-07-02 NOTE — ASSESSMENT & PLAN NOTE
· Likely due to acute respiratory failure from COVID-19 pneumonia  · Patient denies any chest pain or shortness of breath    · Will continue warfarin for anticoagulation given his lack of symptoms at this time  · Outpatient cardiac evaluation when stable  · Hyperglycemia due to steroids

## 2020-07-02 NOTE — PLAN OF CARE
Problem: PAIN - ADULT  Goal: Verbalizes/displays adequate comfort level or baseline comfort level  Description  Interventions:  - Encourage patient to monitor pain and request assistance  - Assess pain using appropriate pain scale  - Administer analgesics based on type and severity of pain and evaluate response  - Implement non-pharmacological measures as appropriate and evaluate response  - Consider cultural and social influences on pain and pain management  - Notify physician/advanced practitioner if interventions unsuccessful or patient reports new pain  Outcome: Progressing     Problem: INFECTION - ADULT  Goal: Absence or prevention of progression during hospitalization  Description  INTERVENTIONS:  - Assess and monitor for signs and symptoms of infection  - Monitor lab/diagnostic results  - Monitor all insertion sites, i e  indwelling lines, tubes, and drains  - Monitor endotracheal if appropriate and nasal secretions for changes in amount and color  - [place] appropriate cooling/warming therapies per order  - Administer medications as ordered  - Instruct and encourage patient and family to use good hand hygiene technique  - Identify and instruct in appropriate isolation precautions for identified infection/condition  Outcome: Progressing  Goal: Absence of fever/infection during neutropenic period  Description  INTERVENTIONS:  - Monitor WBC    Outcome: Progressing     Problem: SAFETY ADULT  Goal: Patient will remain free of falls  Description  INTERVENTIONS:  - Assess patient frequently for physical needs  -  Identify cognitive and physical deficits and behaviors that affect risk of falls    -  [place] fall precautions as indicated by assessment   - Educate patient/family on patient safety including physical limitations  - Instruct patient to call for assistance with activity based on assessment  - Modify environment to reduce risk of injury  - Consider OT/PT consult to assist with strengthening/mobility  Outcome: Progressing  Goal: Maintain or return to baseline ADL function  Description  INTERVENTIONS:  -  Assess patient's ability to carry out ADLs; assess patient's baseline for ADL function and identify physical deficits which impact ability to perform ADLs (bathing, care of mouth/teeth, toileting, grooming, dressing, etc )  - Assess/evaluate cause of self-care deficits   - Assess range of motion  - Assess patient's mobility; develop plan if impaired  - Assess patient's need for assistive devices and provide as appropriate  - Encourage maximum independence but intervene and supervise when necessary  - Involve family in performance of ADLs  - Assess for home care needs following discharge   - Consider OT consult to assist with ADL evaluation and planning for discharge  - Provide patient education as appropriate  Outcome: Progressing  Goal: Maintain or return mobility status to optimal level  Description  INTERVENTIONS:  - Assess patient's baseline mobility status (ambulation, transfers, stairs, etc )    - Identify cognitive and physical deficits and behaviors that affect mobility  - Identify mobility aids required to assist with transfers and/or ambulation (gait belt, sit-to-stand, lift, walker, cane, etc )  - [place] fall precautions as indicated by assessment  - Record patient progress and toleration of activity level on Mobility SBAR; progress patient to next Phase/Stage  - Instruct patient to call for assistance with activity based on assessment  - Consider rehabilitation consult to assist with strengthening/weightbearing, etc   Outcome: Progressing     Problem: Prexisting or High Potential for Compromised Skin Integrity  Goal: Skin integrity is maintained or improved  Description  INTERVENTIONS:  - Identify patients at risk for skin breakdown  - Assess and monitor skin integrity  - Assess and monitor nutrition and hydration status  - Monitor labs   - Assess for incontinence   - Turn and reposition patient  - Assist with mobility/ambulation  - Relieve pressure over bony prominences  - Avoid friction and shearing  - Provide appropriate hygiene as needed including keeping skin clean and dry  - Evaluate need for skin moisturizer/barrier cream  - Collaborate with interdisciplinary team   - Patient/family teaching  - Consider wound care consult   Outcome: Progressing     Problem: Potential for Falls  Goal: Patient will remain free of falls  Description  INTERVENTIONS:  - Assess patient frequently for physical needs  -  Identify cognitive and physical deficits and behaviors that affect risk of falls    -  [place] fall precautions as indicated by assessment   - Educate patient/family on patient safety including physical limitations  - Instruct patient to call for assistance with activity based on assessment  - Modify environment to reduce risk of injury  - Consider OT/PT consult to assist with strengthening/mobility  Outcome: Progressing

## 2020-07-02 NOTE — SOCIAL WORK
CM gave patient a Care Port list for STR to pick possible rehabs  Currently not medically stable  still having fevers

## 2020-07-02 NOTE — ASSESSMENT & PLAN NOTE
Wt Readings from Last 3 Encounters:   07/02/20 (!) 150 kg (331 lb)   06/15/20 (!) 155 kg (341 lb 4 4 oz)   06/08/20 (!) 154 kg (340 lb 9 8 oz)     · Volume status is adequate  · Monitor intake and output  · Monitor daily weights  · Will hold torsemide tomorrow due to increased creatinine

## 2020-07-02 NOTE — ASSESSMENT & PLAN NOTE
· Pt presents with 1 week symptoms at [facility]  · Currently receiving COVID moderate treatment algorithm  · Continue ceftriaxone and doxycycline, given elevated procalcitonin, continue to trend  · Continue vitamin-C, vitamin-D, zinc supplementation  · Monitor temps, WBC, procalcitonin, inflammatory markers  · Inflammatory marker are high will continue with steroids, repeat labs in the am

## 2020-07-02 NOTE — ASSESSMENT & PLAN NOTE
Lab Results   Component Value Date    HGBA1C 8 8 (H) 06/25/2020       Recent Labs     07/01/20  2109 07/02/20  0711 07/02/20  1055 07/02/20  1605   POCGLU 141* 95 194* 208*       Blood Sugar Average: Last 72 hrs:  (P) 228 [id number]   · Uncontrolled due to IV steroid use  · Improved today  · Diabetic diet  · Will decrease insulin today as the patient had some mild hypoglycemia  · Continue sliding scale coverage

## 2020-07-03 LAB
ANION GAP SERPL CALCULATED.3IONS-SCNC: 5 MMOL/L (ref 4–13)
BUN SERPL-MCNC: 69 MG/DL (ref 5–25)
CALCIUM SERPL-MCNC: 9.3 MG/DL (ref 8.3–10.1)
CHLORIDE SERPL-SCNC: 107 MMOL/L (ref 100–108)
CO2 SERPL-SCNC: 31 MMOL/L (ref 21–32)
CREAT SERPL-MCNC: 1.11 MG/DL (ref 0.6–1.3)
CRP SERPL QL: 34.9 MG/L
D DIMER PPP FEU-MCNC: 1 UG/ML FEU
ERYTHROCYTE [DISTWIDTH] IN BLOOD BY AUTOMATED COUNT: 15.2 % (ref 11.6–15.1)
GFR SERPL CREATININE-BSD FRML MDRD: 68 ML/MIN/1.73SQ M
GLUCOSE SERPL-MCNC: 123 MG/DL (ref 65–140)
GLUCOSE SERPL-MCNC: 155 MG/DL (ref 65–140)
GLUCOSE SERPL-MCNC: 67 MG/DL (ref 65–140)
GLUCOSE SERPL-MCNC: 76 MG/DL (ref 65–140)
GLUCOSE SERPL-MCNC: 96 MG/DL (ref 65–140)
GLUCOSE SERPL-MCNC: 98 MG/DL (ref 65–140)
HCT VFR BLD AUTO: 36.5 % (ref 36.5–49.3)
HGB BLD-MCNC: 11.3 G/DL (ref 12–17)
MCH RBC QN AUTO: 27.6 PG (ref 26.8–34.3)
MCHC RBC AUTO-ENTMCNC: 31 G/DL (ref 31.4–37.4)
MCV RBC AUTO: 89 FL (ref 82–98)
NRBC BLD AUTO-RTO: 0 /100 WBCS
PLATELET # BLD AUTO: 231 THOUSANDS/UL (ref 149–390)
PMV BLD AUTO: 10.8 FL (ref 8.9–12.7)
POTASSIUM SERPL-SCNC: 3.9 MMOL/L (ref 3.5–5.3)
PROCALCITONIN SERPL-MCNC: 0.09 NG/ML
RBC # BLD AUTO: 4.09 MILLION/UL (ref 3.88–5.62)
SODIUM SERPL-SCNC: 143 MMOL/L (ref 136–145)
WBC # BLD AUTO: 5.59 THOUSAND/UL (ref 4.31–10.16)

## 2020-07-03 PROCEDURE — 97535 SELF CARE MNGMENT TRAINING: CPT

## 2020-07-03 PROCEDURE — 84145 PROCALCITONIN (PCT): CPT | Performed by: INTERNAL MEDICINE

## 2020-07-03 PROCEDURE — 97530 THERAPEUTIC ACTIVITIES: CPT

## 2020-07-03 PROCEDURE — 85027 COMPLETE CBC AUTOMATED: CPT | Performed by: INTERNAL MEDICINE

## 2020-07-03 PROCEDURE — 97110 THERAPEUTIC EXERCISES: CPT

## 2020-07-03 PROCEDURE — 86140 C-REACTIVE PROTEIN: CPT | Performed by: INTERNAL MEDICINE

## 2020-07-03 PROCEDURE — 80048 BASIC METABOLIC PNL TOTAL CA: CPT | Performed by: INTERNAL MEDICINE

## 2020-07-03 PROCEDURE — 82948 REAGENT STRIP/BLOOD GLUCOSE: CPT

## 2020-07-03 PROCEDURE — 97112 NEUROMUSCULAR REEDUCATION: CPT

## 2020-07-03 PROCEDURE — 99233 SBSQ HOSP IP/OBS HIGH 50: CPT | Performed by: INTERNAL MEDICINE

## 2020-07-03 PROCEDURE — 85379 FIBRIN DEGRADATION QUANT: CPT | Performed by: INTERNAL MEDICINE

## 2020-07-03 RX ADMIN — MICONAZOLE NITRATE 1 APPLICATION: 20 CREAM TOPICAL at 09:01

## 2020-07-03 RX ADMIN — MICONAZOLE NITRATE 1 APPLICATION: 20 CREAM TOPICAL at 23:31

## 2020-07-03 RX ADMIN — WARFARIN SODIUM 5 MG: 5 TABLET ORAL at 17:40

## 2020-07-03 RX ADMIN — Medication 1 TABLET: at 08:34

## 2020-07-03 RX ADMIN — TORSEMIDE 20 MG: 20 TABLET ORAL at 08:34

## 2020-07-03 RX ADMIN — CLOTRIMAZOLE: 10 CREAM TOPICAL at 08:33

## 2020-07-03 RX ADMIN — METHYLPREDNISOLONE SODIUM SUCCINATE 125 MG: 125 INJECTION, POWDER, FOR SOLUTION INTRAMUSCULAR; INTRAVENOUS at 11:18

## 2020-07-03 RX ADMIN — MICONAZOLE NITRATE 1 APPLICATION: 20 CREAM TOPICAL at 17:42

## 2020-07-03 RX ADMIN — MELATONIN 2000 UNITS: at 08:34

## 2020-07-03 RX ADMIN — TORSEMIDE 20 MG: 20 TABLET ORAL at 17:41

## 2020-07-03 RX ADMIN — METOPROLOL TARTRATE 25 MG: 25 TABLET, FILM COATED ORAL at 08:34

## 2020-07-03 RX ADMIN — CEFTRIAXONE SODIUM 1000 MG: 1 INJECTION, POWDER, FOR SOLUTION INTRAMUSCULAR; INTRAVENOUS at 13:18

## 2020-07-03 RX ADMIN — CLOTRIMAZOLE 1 APPLICATION: 10 CREAM TOPICAL at 22:48

## 2020-07-03 RX ADMIN — ATORVASTATIN CALCIUM 40 MG: 40 TABLET, FILM COATED ORAL at 17:40

## 2020-07-03 RX ADMIN — FLUTICASONE FUROATE AND VILANTEROL TRIFENATATE 1 PUFF: 200; 25 POWDER RESPIRATORY (INHALATION) at 08:34

## 2020-07-03 RX ADMIN — MELATONIN 6 MG: at 22:48

## 2020-07-03 RX ADMIN — DOXYCYCLINE 100 MG: 100 CAPSULE ORAL at 11:18

## 2020-07-03 RX ADMIN — METOPROLOL TARTRATE 25 MG: 25 TABLET, FILM COATED ORAL at 22:48

## 2020-07-03 RX ADMIN — INSULIN LISPRO 2 UNITS: 100 INJECTION, SOLUTION INTRAVENOUS; SUBCUTANEOUS at 12:07

## 2020-07-03 NOTE — PROGRESS NOTES
Progress Note - Sudheer Delgado 1950, 81 y o  male MRN: [id number]    Unit/Bed#: [facility] 821-01 Encounter: [id number]    Primary Care Provider: Boston Dhillon MD   Date and time admitted to hospital: 6/25/2020 11:43 AM        Sepsis due to COVID-19 pneumonia [facility])  Assessment & Plan  · Patient presenting with tachycardia, tachypnea, hypoxia and confirm COVID-19 pneumonia  · procalcitonin is coming down to 0 55 today, he is having no trouble breathing  · Would keep saturations between 89-92%  · Blood cultures negative    · Need    CKD (chronic kidney disease)  Assessment & Plan  · Baseline creatinine around 1 2   · Management as above    Chronic obstructive pulmonary disease, unspecified ([facility] [address] )  Assessment & Plan  · Stable without any wheezing  · Continue home inhalers  Type 2 acute myocardial infarction [facility])  Assessment & Plan  · Likely due to acute respiratory failure from COVID-19 pneumonia  · Patient denies any chest pain or shortness of breath  · Will continue warfarin for anticoagulation given his lack of symptoms at this time  · Outpatient cardiac evaluation when stable  · Hyperglycemia due to steroids    Acute renal failure (ARF) (HCC)  Assessment & Plan  · Acute kidney failure likely due to Sepsis evidenced by Cr 1 73 (baseline Cr 1 2)  · Creatinine slightly increased today, possibly due to fever, encouraged patient to increase fluid intake  · Avoiding IVF due to his history of CHF  · Avoid nephrotoxins, hypotension    · Monitor BMP    Chronic diastolic congestive heart failure [facility])  Assessment & Plan  Wt Readings from Last 3 Encounters:   07/03/20 (!) 148 kg (327 lb)   06/15/20 (!) 155 kg (341 lb 4 4 oz)   06/08/20 (!) 154 kg (340 lb 9 8 oz)     · Volume status is adequate  · Monitor intake and output  · Monitor daily weights  · Will hold torsemide tomorrow due to increased creatinine    Chronic atrial fibrillation (HCC)  Assessment & Plan  · Rate controlled with metoprolol  · Warfarin for anticoagulation  · INR is increasing, likely secondary to antibiotics, check iNR in the morning    Essential hypertension  Assessment & Plan  · Well controlled  · Continue metoprolol   · Holding torsemide tomorrow due to increased creatinine today    Diabetes mellitus type 2, uncontrolled ([facility] [address] )  Assessment & Plan  Lab Results   Component Value Date    HGBA1C 8 8 (H) 06/25/2020       Recent Labs     07/02/20  1605 07/02/20  2028 07/03/20  0712 07/03/20  1103   POCGLU 208* 254* 98 155*       Blood Sugar Average: Last 72 hrs:  (P) 162 5   · Uncontrolled due to IV steroid use  · Improved today  · Diabetic diet  · Will decrease insulin today as the patient had some mild hypoglycemia  · Continue sliding scale coverage    * Acute respiratory failure with hypoxia due to Pneumonia due to COVID-19 virus  Assessment & Plan  · Pt presents with 1 week symptoms at [facility]  · Currently receiving COVID moderate treatment algorithm  · stopping ceftriaxone and doxycycline, procalcitonin is negative   · Continue vitamin-C, vitamin-D, zinc supplementation  · Monitor temps, WBC,inflammatory markers still up, continue steroid for 10 days  VTE Pharmacologic Prophylaxis:   Pharmacologic: Warfarin (Coumadin)  Mechanical VTE Prophylaxis in Place: Yes    Patient Centered Rounds: I have performed bedside rounds with nursing staff today  Discussions with Specialists or Other Care Team Provider:      Education and Discussions with Family / Patient: patient     Time Spent for Care: 45 minutes  More than 50% of total time spent on counseling and coordination of care as described above  Current Length of Stay: 8 day(s)    Current Patient Status: Inpatient   Certification Statement: The patient will continue to require additional inpatient hospital stay due to covid 19 infection    Discharge Plan: pending stroid taper  Off oxygen  Code Status: Level 1 - Full Code      Subjective:   Patient is doing well   He has no oxygen requirement  He is still on steroids  Objective:     Vitals:   Temp (24hrs), Av 9 °F (36 6 °C), Min:97 6 °F (36 4 °C), Max:98 4 °F (36 9 °C)    Temp:  [97 6 °F (36 4 °C)-98 4 °F (36 9 °C)] 97 6 °F (36 4 °C)  HR:  [58-68] 58  Resp:  [16-24] 16  BP: (151-160)/(79-81) 160/81  SpO2:  [90 %-97 %] 90 %  Body mass index is 40 87 kg/m²  Input and Output Summary (last 24 hours): Intake/Output Summary (Last 24 hours) at 7/3/2020 1441  Last data filed at 7/3/2020 1318  Gross per 24 hour   Intake 860 ml   Output 3070 ml   Net -2210 ml       Physical Exam:     Physical Exam   Constitutional: He is oriented to person, place, and time  He appears well-developed and well-nourished  No distress  HENT:   Head: Normocephalic and atraumatic  Right Ear: External ear normal    Left Ear: External ear normal    Nose: Nose normal    Mouth/Throat: Oropharynx is clear and moist  No oropharyngeal exudate  Eyes: Pupils are equal, round, and reactive to light  Conjunctivae and EOM are normal  Right eye exhibits no discharge  Left eye exhibits no discharge  No scleral icterus  Neck: Normal range of motion  Neck supple  No JVD present  No tracheal deviation present  No thyromegaly present  Cardiovascular: Normal rate, regular rhythm, normal heart sounds and intact distal pulses  Exam reveals no gallop and no friction rub  No murmur heard  Pulmonary/Chest: Effort normal and breath sounds normal  No respiratory distress  Abdominal: Soft  Bowel sounds are normal  He exhibits no distension  There is no tenderness  There is no guarding  Musculoskeletal: Normal range of motion  He exhibits no edema, tenderness or deformity  Lymphadenopathy:     He has no cervical adenopathy  Neurological: He is alert and oriented to person, place, and time  He displays normal reflexes  No cranial nerve deficit or sensory deficit  He exhibits normal muscle tone  Coordination normal    Skin: Skin is warm and dry   No rash noted  He is not diaphoretic  No erythema  No pallor  Psychiatric: He has a normal mood and affect  His behavior is normal  Judgment and thought content normal    Nursing note and vitals reviewed  Additional Data:     Labs:    Results from last 7 days   Lab Units 07/03/20  0449 07/02/20  0607 07/01/20  0558   WBC Thousand/uL 5 59 4 38 5 05   HEMOGLOBIN g/dL 11 3* 11 1* 11 2*   HEMATOCRIT % 36 5 36 5 37 6   PLATELETS Thousands/uL 231 220 204   NEUTROS PCT %  --   --  90*   LYMPHS PCT %  --   --  6*   LYMPHO PCT %  --  1*  --    MONOS PCT %  --   --  3*   MONO PCT %  --  3*  --    EOS PCT %  --  0 0     Results from last 7 days   Lab Units 07/03/20 0449 07/02/20  0607   POTASSIUM mmol/L 3 9 3 8   CHLORIDE mmol/L 107 106   CO2 mmol/L 31 33*   BUN mg/dL 69* 75*   CREATININE mg/dL 1 11 1 28   CALCIUM mg/dL 9 3 9 1   ALK PHOS U/L  --  179*   ALT U/L  --  48   AST U/L  --  52*     Results from last 7 days   Lab Units 06/29/20  0558   INR  2 74*       * I Have Reviewed All Lab Data Listed Above  * Additional Pertinent Lab Tests Reviewed:  [address] [facility] Admission Reviewed    Imaging:    Imaging Reports Reviewed Today Include:    Imaging Personally Reviewed by Myself Includes:      Recent Cultures (last 7 days):           Last 24 Hours Medication List:     Current Facility-Administered Medications:  acetaminophen 650 mg Oral Q6H PRN Mildred Gottron, MD   albuterol 2 puff Inhalation Q6H PRN Mildred Gottron, MD   atorvastatin 40 mg Oral After Walt Davison MD   bisacodyl 10 mg Rectal Daily PRN Mildred Gottron, MD   cholecalciferol 2,000 Units Oral Daily Mildred Gottron, MD   clotrimazole  Topical Q12H Vivien Lundborg, MD   fluticasone-vilanterol 1 puff Inhalation Daily Mildred Gottron, MD   insulin glargine 35 Units Subcutaneous HS Sunday MD Tanvir   insulin lispro 1-5 Units Subcutaneous HS Alphia Corn, PA-C   insulin lispro 12 Units Subcutaneous TID With Meals Adolph Pittman MD   insulin lispro 2-12 Units Subcutaneous TID AC Jayce Arambula MD   magnesium hydroxide 30 mL Oral Daily PRN Jayce Arambula MD   melatonin 6 mg Oral HS Tuesday M NICOLA Heath   methylPREDNISolone sodium succinate 125 mg Intravenous Daily Jayce Arambula MD   metoprolol tartrate 25 mg Oral Q12H Jayce Arambula MD   [place] ANTIFUNGAL 1 application Topical TID Adolph Pittman MD   multivitamin-minerals 1 tablet Oral Daily Jayce Arambula MD   torsemide 20 mg Oral BID (diuretic) Adolph Pittman MD   warfarin 5 mg Oral Daily (warfarin) Adolph Pittman MD        Today, Patient Was Seen By: Melissa Petit MD    ** Please Note: Dictation voice to text software may have been used in the creation of this document   **

## 2020-07-03 NOTE — PHYSICAL THERAPY NOTE
Physical Therapy Treatment Note    Patient's Name: Rito Alpers  : 20 1415   Pain Assessment   Pain Assessment Tool Pain Assessment not indicated - pt denies pain   Pain Score No Pain   Restrictions/Precautions   Weight Bearing Precautions Per Order No   Other Precautions Airborne/isolation;Contact/isolation;Cognitive; Chair Alarm; Bed Alarm; Fall Risk;O2  (COVID-19 positive, 2L O2 )   General   Chart Reviewed Yes   Family/Caregiver Present No   Cognition   Overall Cognitive Status Impaired   Arousal/Participation Alert; Uncooperative   Attention Attends with cues to redirect   Orientation Level   (Pt refusing orientation assessment )   Memory Decreased recall of precautions;Decreased recall of recent events   Following Commands Follows one step commands inconsistently   Comments Pt with increase in depressed affect noted from prior session, now uncooperative at times, unable to state goals for prosthetic training, frustrated by COVID diagnosis and hospital stay  Bed Mobility   Supine to Sit 3  Moderate assistance   Additional items Assist x 1; Increased time required;Verbal cues   Additional Comments Pt long sitting in bed upon PT arrival with R LE off edge of bed, required modA to scoot toward EOB in preparation for standing tasks after maximal encouragement  Transfers   Sit to Stand 2  Maximal assistance   Additional items Assist x 2; Increased time required;Verbal cues   Stand to Sit 2  Maximal assistance   Additional items Assist x 2; Increased time required;Verbal cues   Additional Comments Sit to stand performed x2 trials, VC's and tactile cues for hip extension into standing, initial stand approximately 25%, second stand approximately 50% erect standing posture    Pt fatigued, notable by CROCKER, O2 90-93% on 2L with activity    Balance   Static Sitting Fair   Dynamic Sitting Poor +   Activity Tolerance   Activity Tolerance Patient limited by fatigue   Medical Staff Made Aware OT, Randi   Nurse Made Aware RN updated  Bed alarm intact   Exercises   Knee AROM Long Arc Quad Sitting;10 reps;AROM; Right  (x2 sets)   Marching Sitting;10 reps;AAROM; Right  (x2 sets)   Assessment   Prognosis Fair   Problem List Decreased strength;Decreased endurance; Impaired balance;Decreased mobility; Decreased cognition;Decreased safety awareness; Obesity   Assessment Pt with depressed affect today, required maximal encouragement to participate in therapy session today  Pt frustrated with hospital stay and decline in functional status, unable to direct attention to mobility and toward goals of ambulation with prosthesis previously stated  Pt continues to require +2 assistance for sit to stand transfers, increased weakness in R LE noted with LE exercise  Pt will benefit from continued skilled PT intervention during course of hospital stay to improve strength, endurance and balance to improve safety with functional mobility  Recommend IP rehab upon hospital D/C  Goals   Patient Goals "to get out of here"   STG Expiration Date 07/09/20   PT Treatment Day 2   Plan   Treatment/Interventions Functional transfer training;LE strengthening/ROM; Therapeutic exercise; Endurance training;Cognitive reorientation;Patient/family training;Equipment eval/education; Bed mobility   Progress Progressing toward goals   PT Frequency Other (Comment)  (3-5x/week)   Recommendation   PT Discharge Recommendation Post-Acute Rehabilitation Services   PT - OK to Discharge Yes  (to IP rehab)       Sarah Bryan, PT, DPT

## 2020-07-03 NOTE — OCCUPATIONAL THERAPY NOTE
[address] Progress Note     Patient Name: Linda Olivarez  LXIUA'G Date: 7/3/2020  Problem List  Principal Problem:    Acute respiratory failure with hypoxia due to Pneumonia due to COVID-19 virus  Active Problems:    Diabetes mellitus type 2, uncontrolled (HCC)    Essential hypertension    Chronic atrial fibrillation (HCC)    Chronic diastolic congestive heart failure (HCC)    Acute renal failure (ARF) (HCC)    Type 2 acute myocardial infarction [facility])    Chronic obstructive pulmonary disease, unspecified (HCC)    CKD (chronic kidney disease)    Sepsis due to COVID-19 pneumonia ([facility] [address] )          07/03/20 1414   Restrictions/Precautions   Weight Bearing Precautions Per Order No   Other Precautions Contact/isolation; Airborne/isolation;Droplet precautions;Cognitive; Bed Alarm;Telemetry;O2;Fall Risk  (+COVID-19 confirmed, +2L 02)   Lifestyle   Autonomy Assistance with ADL's/IADL's, +RW with transfers to w/c, w/c dependent mobility   Reciprocal Relationships facility   Service to Others retired   Intrinsic Gratification bingo   Pain Assessment   Pain Assessment Tool Pain Assessment not indicated - pt denies pain   Pain Score No Pain   ADL   Where Assessed Edge of bed   Grooming Assistance 3  Moderate Assistance   Grooming Deficit Brushing hair  (assistance for thoroughness)   LB Dressing Assistance 2  Maximal Assistance   LB Dressing Deficit Don/doff R sock   Bed Mobility   Supine to Sit 3  Moderate assistance   Additional items Assist x 1  (pt received seated in long sitting close to EOB assistance to center self with encouragement cues given  )   Transfers   Sit to Stand 2  Maximal assistance   Additional items Assist x 2   Stand to Sit 2  Maximal assistance   Additional items Assist x 2   Additional Comments Pt performed 2 sit to stand tranfers with max encouragement first max a x 2 cleared bottom from bed only, 2nd stand achieved 50%  stand with cues for full upright posture, unable to tolerate standing >5-10 seconds-pt reports 2* to pain in right LE/weakness with standing  Functional Mobility   Additional Comments unable to assess   Neuromuscular Education   Trunk Control Supervision (slightly) out of sitting base of support with UE reaching activities to laterally to R/L and forward  Cognition   Overall Cognitive Status Impaired   Arousal/Participation Alert; Responsive   Attention Difficulty attending to directions   Orientation Level Oriented X4   Memory Decreased recall of precautions;Decreased recall of recent events;Decreased short term memory  (unable to report lunch eaten or ordered for dinner)   Following Commands Follows one step commands inconsistently   Comments Pt alert and oriented x 3, forgetful, appeared frustrated with hospital admission and stating "I want to leave tick tock",impaired motivated/cooperation this session (encouragement cues and explanation of rehab process and benefits of therapy given), pt with difficulty comprehending therapy goals and communicating feelings, (stating "I dont know" to questions throughout session) min conversant mostly short answers only today  Pt not interested in leisure tasks -declining newspaper, puzzles, cards  RN notified and aware   Activity Tolerance   Activity Tolerance Patient limited by fatigue  (+2L 02 , mild CROCKER with standing transfers , SP02 remained WF)   Medical Staff Made Aware RN cleared pt for therapy   Assessment   Assessment Patient participated in Skilled OT session this date with interventions consisting of functional transfers, trunk balance challenging exercises, UB strengthening exercises -shoulder,elbow flex/ext 3x -see assistance levels above   Patient agreeable to OT treatment session, upon arrival patient was found supine in bed  In comparison to previous session, continues with decreased activity tolerance, weakness  Patient requiring verbal cues for safety, verbal cues for correct technique and frequent rest periods   Patient continues to be functioning below baseline level, occupational performance remains limited secondary to factors listed above and increased risk for falls and injury  From OT standpoint, recommendation at time of d/c would be Short Term Rehab  Patient to benefit from continued Occupational Therapy treatment while in the hospital to address deficits as defined above and maximize level of functional independence with ADLs and functional mobility  Plan   Treatment Interventions ADL retraining;Functional transfer training; Endurance training;Patient/family training;Equipment evaluation/education; Compensatory technique education; Activityengagement; Energy conservation   Goal Expiration Date 07/13/20   OT Treatment Day 2   OT Frequency 3-5x/wk   Recommendation   OT Discharge Recommendation Post-Acute Rehabilitation Services   OT - OK to Discharge Yes     Linda Rees MOT, OTR/L

## 2020-07-03 NOTE — PLAN OF CARE
Problem: PHYSICAL THERAPY ADULT  Goal: Performs mobility at highest level of function for planned discharge setting  See evaluation for individualized goals  Description  Treatment/Interventions: Functional transfer training, LE strengthening/ROM, Therapeutic exercise, Endurance training, Cognitive reorientation, Patient/family training, Equipment eval/education, Bed mobility          See flowsheet documentation for full assessment, interventions and recommendations  Outcome: Progressing  Note:   Prognosis: Fair  Problem List: Decreased strength, Decreased endurance, Impaired balance, Decreased mobility, Decreased cognition, Decreased safety awareness, Obesity  Assessment: Pt with depressed affect today, required maximal encouragement to participate in therapy session today  Pt frustrated with hospital stay and decline in functional status, unable to direct attention to mobility and toward goals of ambulation with prosthesis previously stated  Pt continues to require +2 assistance for sit to stand transfers, increased weakness in R LE noted with LE exercise  Pt will benefit from continued skilled PT intervention during course of hospital stay to improve strength, endurance and balance to improve safety with functional mobility  Recommend IP rehab upon hospital D/C  PT Discharge Recommendation: Post-Acute Rehabilitation Services     PT - OK to Discharge: Yes(to IP rehab)    See flowsheet documentation for full assessment

## 2020-07-03 NOTE — ASSESSMENT & PLAN NOTE
· Pt presents with 1 week symptoms at [facility]  · Currently receiving COVID moderate treatment algorithm  · stopping ceftriaxone and doxycycline, procalcitonin is negative   · Continue vitamin-C, vitamin-D, zinc supplementation  · Monitor temps, WBC,inflammatory markers still up, continue steroid for 10 days

## 2020-07-03 NOTE — PLAN OF CARE
Problem: PAIN - ADULT  Goal: Verbalizes/displays adequate comfort level or baseline comfort level  Description  Interventions:  - Encourage patient to monitor pain and request assistance  - Assess pain using appropriate pain scale  - Administer analgesics based on type and severity of pain and evaluate response  - Implement non-pharmacological measures as appropriate and evaluate response  - Consider cultural and social influences on pain and pain management  - Notify physician/advanced practitioner if interventions unsuccessful or patient reports new pain  Outcome: Progressing     Problem: INFECTION - ADULT  Goal: Absence or prevention of progression during hospitalization  Description  INTERVENTIONS:  - Assess and monitor for signs and symptoms of infection  - Monitor lab/diagnostic results  - Monitor all insertion sites, i e  indwelling lines, tubes, and drains  - Monitor endotracheal if appropriate and nasal secretions for changes in amount and color  - [place] appropriate cooling/warming therapies per order  - Administer medications as ordered  - Instruct and encourage patient and family to use good hand hygiene technique  - Identify and instruct in appropriate isolation precautions for identified infection/condition  Outcome: Progressing  Goal: Absence of fever/infection during neutropenic period  Description  INTERVENTIONS:  - Monitor WBC    Outcome: Progressing     Problem: SAFETY ADULT  Goal: Patient will remain free of falls  Description  INTERVENTIONS:  - Assess patient frequently for physical needs  -  Identify cognitive and physical deficits and behaviors that affect risk of falls    -  [place] fall precautions as indicated by assessment   - Educate patient/family on patient safety including physical limitations  - Instruct patient to call for assistance with activity based on assessment  - Modify environment to reduce risk of injury  - Consider OT/PT consult to assist with strengthening/mobility  Outcome: Progressing  Goal: Maintain or return to baseline ADL function  Description  INTERVENTIONS:  -  Assess patient's ability to carry out ADLs; assess patient's baseline for ADL function and identify physical deficits which impact ability to perform ADLs (bathing, care of mouth/teeth, toileting, grooming, dressing, etc )  - Assess/evaluate cause of self-care deficits   - Assess range of motion  - Assess patient's mobility; develop plan if impaired  - Assess patient's need for assistive devices and provide as appropriate  - Encourage maximum independence but intervene and supervise when necessary  - Involve family in performance of ADLs  - Assess for home care needs following discharge   - Consider OT consult to assist with ADL evaluation and planning for discharge  - Provide patient education as appropriate  Outcome: Progressing  Goal: Maintain or return mobility status to optimal level  Description  INTERVENTIONS:  - Assess patient's baseline mobility status (ambulation, transfers, stairs, etc )    - Identify cognitive and physical deficits and behaviors that affect mobility  - Identify mobility aids required to assist with transfers and/or ambulation (gait belt, sit-to-stand, lift, walker, cane, etc )  - [place] fall precautions as indicated by assessment  - Record patient progress and toleration of activity level on Mobility SBAR; progress patient to next Phase/Stage  - Instruct patient to call for assistance with activity based on assessment  - Consider rehabilitation consult to assist with strengthening/weightbearing, etc   Outcome: Progressing     Problem: Prexisting or High Potential for Compromised Skin Integrity  Goal: Skin integrity is maintained or improved  Description  INTERVENTIONS:  - Identify patients at risk for skin breakdown  - Assess and monitor skin integrity  - Assess and monitor nutrition and hydration status  - Monitor labs   - Assess for incontinence   - Turn and reposition patient  - Assist with mobility/ambulation  - Relieve pressure over bony prominences  - Avoid friction and shearing  - Provide appropriate hygiene as needed including keeping skin clean and dry  - Evaluate need for skin moisturizer/barrier cream  - Collaborate with interdisciplinary team   - Patient/family teaching  - Consider wound care consult   Outcome: Progressing     Problem: Potential for Falls  Goal: Patient will remain free of falls  Description  INTERVENTIONS:  - Assess patient frequently for physical needs  -  Identify cognitive and physical deficits and behaviors that affect risk of falls    -  [place] fall precautions as indicated by assessment   - Educate patient/family on patient safety including physical limitations  - Instruct patient to call for assistance with activity based on assessment  - Modify environment to reduce risk of injury  - Consider OT/PT consult to assist with strengthening/mobility  Outcome: Progressing

## 2020-07-03 NOTE — WOUND OSTOMY CARE
Progress Note - Wound   Julieth Dumont 81 y o  male MRN: [id number]  Unit/Bed#: [facility] 821-01 Encounter: [id number]        Assessment:   Patient is seen for wound care follow up  Patient examined, wounds measured, and pictures taken by primary nurse who also applied dressings  I did not physically examine the patient  Findings  1  L and R buttock stage 2 pressure injuries    right    Left           2  L posterior thigh DTI        3  L antetior thigh stage 2 pressure injury        4  RLE venous stasis with skin tear      See flowsheets for details    Skin care plans:  1-Hydraguard to R heel BID and PRN  2-Elevate heel to offload pressure  3-Ehob cushion in chair when out of bed  4-Moisturize skin daily with skin nourishing cream   5-Turn/reposition q2h or when medically stable for pressure re-distribution on skin  6-Travis cream to sacrum, buttock and abdominal fold and groin TID and PRN  7-Allevyn foam to L posterior thigh, change every three days  8-Dermagran and Allevyn foam to L anterior thigh wound, change every three days  9-Cleanse RLE skin tear with NSS, dermagran to wound bed, DSD and ameena, Change every three days  Call or tigertext with any questions  Wound Care will continue to follow    Wound 06/26/20 Pressure Injury Thigh Anterior; Left (Active)   Wound Image   7/3/2020  8:44 AM   Wound Description Pink;Fragile;Epithelialization 7/3/2020  8:44 AM   Staging Stage II 7/3/2020  8:44 AM   Yi-wound Assessment Fragile;Pink 7/3/2020  8:44 AM   Wound Length (cm) 7 5 cm 7/3/2020  8:44 AM   Wound Width (cm) 1 5 cm 7/3/2020  8:44 AM   Calculated Wound Area (cm^2) 11 25 cm^2 7/3/2020  8:44 AM   Drainage Amount Small 7/2/2020  8:30 AM   Drainage Description Serosanguineous 7/2/2020  8:30 AM   Treatments Cleansed 6/27/2020  8:42 PM   Dressing Foam, Silicon (eg  Allevyn, etc) 7/3/2020  8:44 AM   Dressing Changed New 6/26/2020  9:00 AM   Dressing Status Clean;Dry; Intact 7/2/2020  8:30 AM       Wound 06/26/20 Pressure Injury Buttocks Right (Active)   Wound Image   7/3/2020  8:51 AM   Wound Description Fragile;Pink;Epithelialization 7/3/2020  8:51 AM   Staging Stage II 7/3/2020  8:51 AM   Yi-wound Assessment Fragile;Pink 7/3/2020  8:51 AM   Wound Length (cm) 1 cm 7/3/2020  8:51 AM   Wound Width (cm) 1 cm 7/3/2020  8:51 AM   Wound Depth (cm) 0 1 6/26/2020  9:29 AM   Calculated Wound Area (cm^2) 1 cm^2 7/3/2020  8:51 AM   Calculated Wound Volume (cm^3) 0 8 cm^3 6/26/2020  9:29 AM   Closure Open to air 7/2/2020  8:30 AM   Drainage Amount None 7/2/2020  8:30 AM   Treatments Site care 7/1/2020  8:30 AM   Dressing Moisture barrier 7/3/2020  8:51 AM   Patient Tolerance Tolerated well 6/27/2020  8:42 PM   Dressing Status Clean;Dry; Intact 7/1/2020  4:00 PM       Wound 06/26/20 Pressure Injury Buttocks Left (Active)   Wound Image   7/3/2020  8:51 AM   Wound Description Dry;Brown;Fragile;Pink;Epithelialization 7/3/2020  8:51 AM   Staging Stage II 7/3/2020  8:51 AM   Yi-wound Assessment Clean;Fragile;Pink 7/3/2020  8:51 AM   Wound Length (cm) 0 3 cm 7/3/2020  8:51 AM   Wound Width (cm) 1 5 cm 7/3/2020  8:51 AM   Calculated Wound Area (cm^2) 0 45 cm^2 7/3/2020  8:51 AM   Closure Open to air 7/2/2020  8:30 AM   Drainage Amount None 7/2/2020  8:30 AM   Dressing Moisture barrier 7/3/2020  8:51 AM   Patient Tolerance Tolerated well 6/26/2020  9:31 AM   Dressing Status Clean;Dry; Intact 6/30/2020  4:00 PM       Wound 06/26/20 Pressure Injury Thigh Left;Posterior (Active)   Wound Image   7/3/2020  8:48 AM   Wound Description Fragile;Pink 7/3/2020  8:48 AM   Staging Deep Tissue Injury 7/3/2020  8:48 AM   Yi-wound Assessment Intact 7/3/2020  8:48 AM   Wound Length (cm) 0 5 cm 7/3/2020  8:48 AM   Wound Width (cm) 7 cm 7/3/2020  8:48 AM   Calculated Wound Area (cm^2) 3 5 cm^2 7/3/2020  8:48 AM   Closure LESLEE 7/2/2020  8:30 AM   Drainage Amount None 7/2/2020  8:30 AM   Dressing Foam, Silicon (eg   Allevyn, etc) 7/3/2020  8:48 AM Patient Tolerance Tolerated well 6/26/2020  9:34 AM   Dressing Status Clean;Dry; Intact 7/2/2020  8:30 AM       Wound 06/30/20 Venous stasis ulcer Leg Right; Anterior (Active)   Wound Image   7/3/2020  8:41 AM   Wound Description Clean;Dry;Edema;Fragile;Pink 7/3/2020  8:41 AM   Yi-wound Assessment Clean;Dry;Fragile 7/3/2020  8:41 AM   Wound Length (cm) 17 cm 6/30/2020  5:31 AM   Wound Width (cm) 8 cm 6/30/2020  5:31 AM   Calculated Wound Area (cm^2) 136 cm^2 6/30/2020  5:31 AM   Closure Open to air 7/2/2020  8:30 AM   Drainage Amount Moderate 6/30/2020  5:31 AM   Drainage Description Serosanguineous 6/30/2020  5:31 AM   Treatments Site care 6/30/2020  5:31 AM   Dressing Dermagran gauze;Gauze 7/3/2020  8:41 AM   Dressing Changed Changed 6/30/2020  5:31 AM   Patient Tolerance Tolerated well 6/30/2020  5:31 AM   Dressing Status Clean;Dry; Intact 7/2/2020  8:30 AM

## 2020-07-03 NOTE — ASSESSMENT & PLAN NOTE
Wt Readings from Last 3 Encounters:   07/03/20 (!) 148 kg (327 lb)   06/15/20 (!) 155 kg (341 lb 4 4 oz)   06/08/20 (!) 154 kg (340 lb 9 8 oz)     · Volume status is adequate  · Monitor intake and output  · Monitor daily weights  · Will hold torsemide tomorrow due to increased creatinine

## 2020-07-03 NOTE — PLAN OF CARE
Problem: OCCUPATIONAL THERAPY ADULT  Goal: Performs self-care activities at highest level of function for planned discharge setting  See evaluation for individualized goals  Description  Treatment Interventions: ADL retraining, Functional transfer training, UE strengthening/ROM, Endurance training, Cognitive reorientation, Patient/family training, Equipment evaluation/education, Compensatory technique education, Activityengagement, Energy conservation          See flowsheet documentation for full assessment, interventions and recommendations  Note:   Limitation: Decreased ADL status, Decreased endurance, Decreased cognition, Decreased Safe judgement during ADL, Decreased self-care trans, Decreased high-level ADLs  Prognosis: Fair  Assessment: Patient participated in Skilled OT session this date with interventions consisting of functional transfers, trunk balance challenging exercises, UB strengthening exercises -see assistance levels above   Patient agreeable to OT treatment session, upon arrival patient was found supine in bed  In comparison to previous session, continues with decreased activity tolerance, weakness  Patient requiring verbal cues for safety, verbal cues for correct technique and frequent rest periods  Patient continues to be functioning below baseline level, occupational performance remains limited secondary to factors listed above and increased risk for falls and injury  From OT standpoint, recommendation at time of d/c would be Short Term Rehab  Patient to benefit from continued Occupational Therapy treatment while in the hospital to address deficits as defined above and maximize level of functional independence with ADLs and functional mobility  OT Discharge Recommendation: Post-Acute Rehabilitation Services  OT - OK to Discharge:  Yes

## 2020-07-04 PROBLEM — N17.9 ACUTE RENAL FAILURE (ARF) (HCC): Status: RESOLVED | Noted: 2019-07-30 | Resolved: 2020-07-04

## 2020-07-04 LAB
ANION GAP SERPL CALCULATED.3IONS-SCNC: 3 MMOL/L (ref 4–13)
BUN SERPL-MCNC: 65 MG/DL (ref 5–25)
CALCIUM SERPL-MCNC: 9.4 MG/DL (ref 8.3–10.1)
CHLORIDE SERPL-SCNC: 106 MMOL/L (ref 100–108)
CO2 SERPL-SCNC: 35 MMOL/L (ref 21–32)
CREAT SERPL-MCNC: 1.11 MG/DL (ref 0.6–1.3)
ERYTHROCYTE [DISTWIDTH] IN BLOOD BY AUTOMATED COUNT: 15.3 % (ref 11.6–15.1)
ERYTHROCYTE [SEDIMENTATION RATE] IN BLOOD: 95 MM/HOUR (ref 0–10)
FERRITIN SERPL-MCNC: 1537 NG/ML (ref 8–388)
GFR SERPL CREATININE-BSD FRML MDRD: 68 ML/MIN/1.73SQ M
GLUCOSE SERPL-MCNC: 105 MG/DL (ref 65–140)
GLUCOSE SERPL-MCNC: 117 MG/DL (ref 65–140)
GLUCOSE SERPL-MCNC: 174 MG/DL (ref 65–140)
GLUCOSE SERPL-MCNC: 223 MG/DL (ref 65–140)
GLUCOSE SERPL-MCNC: 223 MG/DL (ref 65–140)
GLUCOSE SERPL-MCNC: 97 MG/DL (ref 65–140)
HCT VFR BLD AUTO: 38.8 % (ref 36.5–49.3)
HGB BLD-MCNC: 11.8 G/DL (ref 12–17)
MCH RBC QN AUTO: 27.3 PG (ref 26.8–34.3)
MCHC RBC AUTO-ENTMCNC: 30.4 G/DL (ref 31.4–37.4)
MCV RBC AUTO: 90 FL (ref 82–98)
NRBC BLD AUTO-RTO: 0 /100 WBCS
PLATELET # BLD AUTO: 247 THOUSANDS/UL (ref 149–390)
PMV BLD AUTO: 10.8 FL (ref 8.9–12.7)
POTASSIUM SERPL-SCNC: 3.9 MMOL/L (ref 3.5–5.3)
RBC # BLD AUTO: 4.33 MILLION/UL (ref 3.88–5.62)
SODIUM SERPL-SCNC: 144 MMOL/L (ref 136–145)
WBC # BLD AUTO: 5.68 THOUSAND/UL (ref 4.31–10.16)

## 2020-07-04 PROCEDURE — 85652 RBC SED RATE AUTOMATED: CPT | Performed by: INTERNAL MEDICINE

## 2020-07-04 PROCEDURE — 82728 ASSAY OF FERRITIN: CPT | Performed by: INTERNAL MEDICINE

## 2020-07-04 PROCEDURE — 82948 REAGENT STRIP/BLOOD GLUCOSE: CPT

## 2020-07-04 PROCEDURE — 80048 BASIC METABOLIC PNL TOTAL CA: CPT | Performed by: INTERNAL MEDICINE

## 2020-07-04 PROCEDURE — 99233 SBSQ HOSP IP/OBS HIGH 50: CPT | Performed by: INTERNAL MEDICINE

## 2020-07-04 PROCEDURE — 85027 COMPLETE CBC AUTOMATED: CPT | Performed by: INTERNAL MEDICINE

## 2020-07-04 RX ORDER — INSULIN GLARGINE 100 [IU]/ML
20 INJECTION, SOLUTION SUBCUTANEOUS ONCE
Status: COMPLETED | OUTPATIENT
Start: 2020-07-04 | End: 2020-07-04

## 2020-07-04 RX ADMIN — INSULIN LISPRO 2 UNITS: 100 INJECTION, SOLUTION INTRAVENOUS; SUBCUTANEOUS at 11:54

## 2020-07-04 RX ADMIN — INSULIN LISPRO 4 UNITS: 100 INJECTION, SOLUTION INTRAVENOUS; SUBCUTANEOUS at 16:02

## 2020-07-04 RX ADMIN — ATORVASTATIN CALCIUM 40 MG: 40 TABLET, FILM COATED ORAL at 17:26

## 2020-07-04 RX ADMIN — CLOTRIMAZOLE 1 APPLICATION: 10 CREAM TOPICAL at 22:34

## 2020-07-04 RX ADMIN — CLOTRIMAZOLE 1 APPLICATION: 10 CREAM TOPICAL at 10:21

## 2020-07-04 RX ADMIN — MELATONIN 6 MG: at 22:34

## 2020-07-04 RX ADMIN — METOPROLOL TARTRATE 25 MG: 25 TABLET, FILM COATED ORAL at 23:10

## 2020-07-04 RX ADMIN — MICONAZOLE NITRATE 1 APPLICATION: 20 CREAM TOPICAL at 10:22

## 2020-07-04 RX ADMIN — MICONAZOLE NITRATE 1 APPLICATION: 20 CREAM TOPICAL at 16:02

## 2020-07-04 RX ADMIN — INSULIN GLARGINE 20 UNITS: 100 INJECTION, SOLUTION SUBCUTANEOUS at 00:23

## 2020-07-04 RX ADMIN — TORSEMIDE 20 MG: 20 TABLET ORAL at 16:01

## 2020-07-04 RX ADMIN — MICONAZOLE NITRATE 1 APPLICATION: 20 CREAM TOPICAL at 23:10

## 2020-07-04 RX ADMIN — TORSEMIDE 20 MG: 20 TABLET ORAL at 08:12

## 2020-07-04 RX ADMIN — FLUTICASONE FUROATE AND VILANTEROL TRIFENATATE 1 PUFF: 200; 25 POWDER RESPIRATORY (INHALATION) at 11:52

## 2020-07-04 RX ADMIN — MELATONIN 2000 UNITS: at 08:12

## 2020-07-04 RX ADMIN — WARFARIN SODIUM 5 MG: 5 TABLET ORAL at 17:26

## 2020-07-04 RX ADMIN — Medication 1 TABLET: at 08:12

## 2020-07-04 RX ADMIN — METOPROLOL TARTRATE 25 MG: 25 TABLET, FILM COATED ORAL at 08:12

## 2020-07-04 RX ADMIN — METHYLPREDNISOLONE SODIUM SUCCINATE 125 MG: 125 INJECTION, POWDER, FOR SOLUTION INTRAMUSCULAR; INTRAVENOUS at 08:11

## 2020-07-04 RX ADMIN — INSULIN LISPRO 1 UNITS: 100 INJECTION, SOLUTION INTRAVENOUS; SUBCUTANEOUS at 22:33

## 2020-07-04 RX ADMIN — INSULIN GLARGINE 35 UNITS: 100 INJECTION, SOLUTION SUBCUTANEOUS at 22:34

## 2020-07-04 NOTE — ASSESSMENT & PLAN NOTE
Lab Results   Component Value Date    HGBA1C 8 8 (H) 06/25/2020       Recent Labs     07/04/20  0000 07/04/20  0729 07/04/20  1109 07/04/20  1557   POCGLU 117 105 174* 223*       Blood Sugar Average: Last 72 hrs:  (P) 534 [id number]   · Uncontrolled due to IV steroid use  · Improved today  · Diabetic diet  · Will decrease insulin today as the patient had some mild hypoglycemia  · Continue sliding scale coverage

## 2020-07-04 NOTE — PROGRESS NOTES
Progress Note - Aurea Moffett 1950, 81 y o  male MRN: [id number]    Unit/Bed#: [facility] 821-01 Encounter: [id number]    Primary Care Provider: Mirta Raymundo MD   Date and time admitted to hospital: 6/25/2020 11:43 AM        CKD (chronic kidney disease)  Assessment & Plan  · Baseline creatinine around 1 2   · Management as above    Chronic obstructive pulmonary disease, unspecified ([facility] [address] )  Assessment & Plan  · Stable without any wheezing  · Continue home inhalers  Type 2 acute myocardial infarction [facility])  Assessment & Plan  · Likely due to acute respiratory failure from COVID-19 pneumonia  · Patient denies any chest pain or shortness of breath  · Will continue warfarin for anticoagulation given his lack of symptoms at this time  · Outpatient cardiac evaluation when stable  · Hyperglycemia due to steroids    Chronic diastolic congestive heart failure (HCC)  Assessment & Plan  Wt Readings from Last 3 Encounters:   07/03/20 (!) 148 kg (327 lb)   06/15/20 (!) 155 kg (341 lb 4 4 oz)   06/08/20 (!) 154 kg (340 lb 9 8 oz)     · Volume status is adequate  · Monitor intake and output  · Monitor daily weights  · Will hold torsemide tomorrow due to increased creatinine    Chronic atrial fibrillation (HCC)  Assessment & Plan  · Rate controlled with metoprolol  · Warfarin for anticoagulation  · INR is increasing, likely secondary to antibiotics, check iNR in the morning    Essential hypertension  Assessment & Plan  · Well controlled    · Continue metoprolol   · Holding torsemide tomorrow due to increased creatinine today    Diabetes mellitus type 2, uncontrolled (HCC)  Assessment & Plan  Lab Results   Component Value Date    HGBA1C 8 8 (H) 06/25/2020       Recent Labs     07/04/20  0000 07/04/20  0729 07/04/20  1109 07/04/20  1557   POCGLU 117 105 174* 223*       Blood Sugar Average: Last 72 hrs:  (P) 065 [id number]   · Uncontrolled due to IV steroid use  · Improved today  · Diabetic diet  · Will decrease insulin today as the patient had some mild hypoglycemia  · Continue sliding scale coverage    * Acute respiratory failure with hypoxia due to Pneumonia due to COVID-19 virus  Assessment & Plan  · Pt presents with 1 week symptoms at [facility]  · Currently receiving COVID moderate treatment algorithm  · stopping ceftriaxone and doxycycline, procalcitonin is negative   · Continue vitamin-C, vitamin-D, zinc supplementation  Monitor temps, WBC,inflammatory markers still up, continue steroid for 10 days  He is on 4 liters nasal canula now      Acute renal failure (ARF) (HCC)-resolved as of 2020  Assessment & Plan  · Acute kidney failure likely due to Sepsis evidenced by Cr 1 73 (baseline Cr 1 2)  · Creatinine slightly increased today, possibly due to fever, encouraged patient to increase fluid intake  · Avoiding IVF due to his history of CHF  · Avoid nephrotoxins, hypotension  · Monitor BMP           VTE Pharmacologic Prophylaxis:   Pharmacologic: Warfarin (Coumadin)  Mechanical VTE Prophylaxis in Place: Yes    Patient Centered Rounds: I have performed bedside rounds with nursing staff today  Discussions with Specialists or Other Care Team Provider:      Education and Discussions with Family / Patient: patient     Time Spent for Care: 45 minutes  More than 50% of total time spent on counseling and coordination of care as described above  Current Length of Stay: 9 day(s)    Current Patient Status: Inpatient   Certification Statement: The patient will continue to require additional inpatient hospital stay due to respiratory distress, post covid    Discharge Plan: possible Monday or Tuesday  Code Status: Level 1 - Full Code      Subjective:   Patient is doing well  He has no questions today   He is having no sob, chest pain, n/v    Objective:     Vitals:   Temp (24hrs), Av 9 °F (36 6 °C), Min:97 4 °F (36 3 °C), Max:98 4 °F (36 9 °C)    Temp:  [97 4 °F (36 3 °C)-98 4 °F (36 9 °C)] 97 9 °F (36 6 °C)  HR:  [57-75] 57  Resp:  [16-18] 16  BP: (135-156)/(86-88) 156/86  SpO2:  [89 %-93 %] 90 %  Body mass index is 40 87 kg/m²  Input and Output Summary (last 24 hours): Intake/Output Summary (Last 24 hours) at 7/4/2020 1759  Last data filed at 7/4/2020 0900  Gross per 24 hour   Intake 30 ml   Output 750 ml   Net -720 ml       Physical Exam:     Physical Exam   Constitutional: He is oriented to person, place, and time  He appears well-developed and well-nourished  HENT:   Head: Normocephalic and atraumatic  Right Ear: External ear normal    Left Ear: External ear normal    Nose: Nose normal    Mouth/Throat: Oropharynx is clear and moist  No oropharyngeal exudate  Eyes: Pupils are equal, round, and reactive to light  Conjunctivae and EOM are normal    Neck: Normal range of motion  Neck supple  Cardiovascular: Normal rate, regular rhythm, normal heart sounds and intact distal pulses  Exam reveals no gallop and no friction rub  No murmur heard  Pulmonary/Chest: Breath sounds normal  No stridor  He is in respiratory distress  He has no wheezes  He has no rales  Abdominal: He exhibits distension  There is no tenderness  Musculoskeletal: Normal range of motion  Neurological: He is alert and oriented to person, place, and time  He displays normal reflexes  No cranial nerve deficit or sensory deficit  He exhibits normal muscle tone  Coordination normal    Skin: Skin is warm and dry  Psychiatric: He has a normal mood and affect  His behavior is normal  Judgment and thought content normal    Nursing note and vitals reviewed        Additional Data:     Labs:    Results from last 7 days   Lab Units 07/04/20  0513  07/02/20  0607 07/01/20  0558   WBC Thousand/uL 5 68   < > 4 38 5 05   HEMOGLOBIN g/dL 11 8*   < > 11 1* 11 2*   HEMATOCRIT % 38 8   < > 36 5 37 6   PLATELETS Thousands/uL 247   < > 220 204   NEUTROS PCT %  --   --   --  90*   LYMPHS PCT %  --   --   --  6*   LYMPHO PCT %  --   --  1*  -- MONOS PCT %  --   --   --  3*   MONO PCT %  --   --  3*  --    EOS PCT %  --   --  0 0    < > = values in this interval not displayed  Results from last 7 days   Lab Units 07/04/20  0513  07/02/20  0607   POTASSIUM mmol/L 3 9   < > 3 8   CHLORIDE mmol/L 106   < > 106   CO2 mmol/L 35*   < > 33*   BUN mg/dL 65*   < > 75*   CREATININE mg/dL 1 11   < > 1 28   CALCIUM mg/dL 9 4   < > 9 1   ALK PHOS U/L  --   --  179*   ALT U/L  --   --  48   AST U/L  --   --  52*    < > = values in this interval not displayed  Results from last 7 days   Lab Units 06/29/20  0558   INR  2 74*       * I Have Reviewed All Lab Data Listed Above  * Additional Pertinent Lab Tests Reviewed:  [facility] Admission Reviewed    Imaging:    Imaging Reports Reviewed Today Include:    Imaging Personally Reviewed by Myself Includes:     Recent Cultures (last 7 days):           Last 24 Hours Medication List:     Current Facility-Administered Medications:  acetaminophen 650 mg Oral Q6H PRN Delonte Tuttle MD   albuterol 2 puff Inhalation Q6H PRN Delonte Tuttle MD   atorvastatin 40 mg Oral After Trey Ortiz MD   bisacodyl 10 mg Rectal Daily PRN Delonte Tuttle MD   cholecalciferol 2,000 Units Oral Daily Delonte Tuttle MD   clotrimazole  Topical Q12H Fercho Bustamante MD   fluticasone-vilanterol 1 puff Inhalation Daily Delonte Tuttle MD   insulin glargine 35 Units Subcutaneous HS Shay Richards MD   insulin lispro 1-5 Units Subcutaneous HS Eveline Newsome PA-C   insulin lispro 12 Units Subcutaneous TID With Meals Shay Richards MD   insulin lispro 2-12 Units Subcutaneous TID AC Delonte Tuttle MD   magnesium hydroxide 30 mL Oral Daily PRN Delonte Tuttle MD   melatonin 6 mg Oral HS Tuesday NICOLA Escobar   methylPREDNISolone sodium succinate 125 mg Intravenous Daily Delonte Tuttle MD   metoprolol tartrate 25 mg Oral Q12H Delonte Tuttle MD   KHADRA ANTIFUNGAL 1 application Topical TID Aníbal Barker MD   multivitamin-minerals 1 tablet Oral Daily Hortensia Martin MD   torsemide 20 mg Oral BID (diuretic) Aníbal Barker MD   warfarin 5 mg Oral Daily (warfarin) Aníbal Barker MD        Today, Patient Was Seen By: Ger Lai MD    ** Please Note: Dictation voice to text software may have been used in the creation of this document   **

## 2020-07-04 NOTE — ASSESSMENT & PLAN NOTE
· Pt presents with 1 week symptoms at [facility]  · Currently receiving COVID moderate treatment algorithm  · stopping ceftriaxone and doxycycline, procalcitonin is negative   · Continue vitamin-C, vitamin-D, zinc supplementation  Monitor temps, WBC,inflammatory markers still up, continue steroid for 10 days    He is on 4 liters nasal canula now

## 2020-07-05 LAB
GLUCOSE SERPL-MCNC: 111 MG/DL (ref 65–140)
GLUCOSE SERPL-MCNC: 167 MG/DL (ref 65–140)
GLUCOSE SERPL-MCNC: 178 MG/DL (ref 65–140)
GLUCOSE SERPL-MCNC: 214 MG/DL (ref 65–140)

## 2020-07-05 PROCEDURE — 99232 SBSQ HOSP IP/OBS MODERATE 35: CPT | Performed by: INTERNAL MEDICINE

## 2020-07-05 PROCEDURE — 82948 REAGENT STRIP/BLOOD GLUCOSE: CPT

## 2020-07-05 RX ADMIN — ATORVASTATIN CALCIUM 40 MG: 40 TABLET, FILM COATED ORAL at 17:01

## 2020-07-05 RX ADMIN — METHYLPREDNISOLONE SODIUM SUCCINATE 125 MG: 125 INJECTION, POWDER, FOR SOLUTION INTRAMUSCULAR; INTRAVENOUS at 08:06

## 2020-07-05 RX ADMIN — Medication 1 TABLET: at 08:06

## 2020-07-05 RX ADMIN — INSULIN LISPRO 4 UNITS: 100 INJECTION, SOLUTION INTRAVENOUS; SUBCUTANEOUS at 12:01

## 2020-07-05 RX ADMIN — CLOTRIMAZOLE 1 APPLICATION: 10 CREAM TOPICAL at 08:07

## 2020-07-05 RX ADMIN — INSULIN LISPRO 2 UNITS: 100 INJECTION, SOLUTION INTRAVENOUS; SUBCUTANEOUS at 08:06

## 2020-07-05 RX ADMIN — INSULIN GLARGINE 35 UNITS: 100 INJECTION, SOLUTION SUBCUTANEOUS at 21:34

## 2020-07-05 RX ADMIN — WARFARIN SODIUM 5 MG: 5 TABLET ORAL at 17:01

## 2020-07-05 RX ADMIN — TORSEMIDE 20 MG: 20 TABLET ORAL at 17:01

## 2020-07-05 RX ADMIN — FLUTICASONE FUROATE AND VILANTEROL TRIFENATATE 1 PUFF: 200; 25 POWDER RESPIRATORY (INHALATION) at 08:07

## 2020-07-05 RX ADMIN — METOPROLOL TARTRATE 25 MG: 25 TABLET, FILM COATED ORAL at 08:06

## 2020-07-05 RX ADMIN — CLOTRIMAZOLE: 10 CREAM TOPICAL at 21:34

## 2020-07-05 RX ADMIN — MICONAZOLE NITRATE 1 APPLICATION: 20 CREAM TOPICAL at 21:35

## 2020-07-05 RX ADMIN — MICONAZOLE NITRATE 1 APPLICATION: 20 CREAM TOPICAL at 17:03

## 2020-07-05 RX ADMIN — TORSEMIDE 20 MG: 20 TABLET ORAL at 08:06

## 2020-07-05 RX ADMIN — MELATONIN 6 MG: at 21:34

## 2020-07-05 RX ADMIN — MELATONIN 2000 UNITS: at 08:06

## 2020-07-05 RX ADMIN — INSULIN LISPRO 2 UNITS: 100 INJECTION, SOLUTION INTRAVENOUS; SUBCUTANEOUS at 17:03

## 2020-07-05 RX ADMIN — METOPROLOL TARTRATE 25 MG: 25 TABLET, FILM COATED ORAL at 21:34

## 2020-07-05 RX ADMIN — MICONAZOLE NITRATE 1 APPLICATION: 20 CREAM TOPICAL at 08:08

## 2020-07-05 NOTE — ASSESSMENT & PLAN NOTE
Wt Readings from Last 3 Encounters:   07/05/20 (!) 146 kg (321 lb 14 4 oz)   06/15/20 (!) 155 kg (341 lb 4 4 oz)   06/08/20 (!) 154 kg (340 lb 9 8 oz)     · Volume status is adequate  · Monitor intake and output  · Monitor daily weights  · Will hold torsemide tomorrow due to increased creatinine

## 2020-07-05 NOTE — PROGRESS NOTES
Progress Note - Yadira Severe 1950, 81 y o  male MRN: [id number]    Unit/Bed#: [facility] 821-01 Encounter: [id number]    Primary Care Provider: Nova Wilde MD   Date and time admitted to hospital: 6/25/2020 11:43 AM        Sepsis due to COVID-19 pneumonia [facility])  Assessment & Plan  · Patient presenting with tachycardia, tachypnea, hypoxia and confirm COVID-19 pneumonia  · procalcitonin is coming down to 0 55 today, he is having no trouble breathing  · Would keep saturations between 89-92%  · Blood cultures negative    · Need    CKD (chronic kidney disease)  Assessment & Plan  · Baseline creatinine around 1 2   · Management as above    Chronic obstructive pulmonary disease, unspecified ([facility] [address] )  Assessment & Plan  · Stable without any wheezing  · Continue home inhalers  Type 2 acute myocardial infarction [facility])  Assessment & Plan  · Likely due to acute respiratory failure from COVID-19 pneumonia  · Patient denies any chest pain or shortness of breath  · Will continue warfarin for anticoagulation given his lack of symptoms at this time  · Outpatient cardiac evaluation when stable  · Hyperglycemia due to steroids    Chronic diastolic congestive heart failure (HCC)  Assessment & Plan  Wt Readings from Last 3 Encounters:   07/05/20 (!) 146 kg (321 lb 14 4 oz)   06/15/20 (!) 155 kg (341 lb 4 4 oz)   06/08/20 (!) 154 kg (340 lb 9 8 oz)     · Volume status is adequate  · Monitor intake and output  · Monitor daily weights  · Will hold torsemide tomorrow due to increased creatinine    Chronic atrial fibrillation (HCC)  Assessment & Plan  · Rate controlled with metoprolol: can consider decrease dose with mild bradycardia   · Warfarin for anticoagulation  · INR is increasing, likely secondary to antibiotics, check iNR in the morning    Essential hypertension  Assessment & Plan  · Well controlled    · Continue metoprolol   · Holding torsemide tomorrow due to increased creatinine today    Diabetes mellitus type 2, uncontrolled [facility])  Assessment & Plan  Lab Results   Component Value Date    HGBA1C 8 8 (H) 2020       Recent Labs     20  1557 20  2045 20  0800 20  1142   POCGLU 223* 223* 178* 214*       Blood Sugar Average: Last 72 hrs:  (P) 154 8125   · Uncontrolled due to IV steroid use  · Improved today  · Diabetic diet  · Will decrease insulin today as the patient had some mild hypoglycemia  · Continue sliding scale coverage    * Acute respiratory failure with hypoxia due to Pneumonia due to COVID-19 virus  Assessment & Plan  · Pt presents with 1 week symptoms at [facility]  · Currently receiving COVID moderate treatment algorithm  · stopping ceftriaxone and doxycycline, procalcitonin is negative   · Continue vitamin-C, vitamin-D, zinc supplementation  Monitor temps, WBC,inflammatory markers still up, continue steroid for 10 days  He is on 4 liters nasal canula now            VTE Pharmacologic Prophylaxis:   Pharmacologic: Warfarin (Coumadin)  Mechanical VTE Prophylaxis in Place: Yes    Patient Centered Rounds: I have performed bedside rounds with nursing staff today  Discussions with Specialists or Other Care Team Provider:     Education and Discussions with Family / Patient: patient waiting for discharge     Time Spent for Care: 45 minutes  More than 50% of total time spent on counseling and coordination of care as described above  Current Length of Stay: 10 day(s)    Current Patient Status: Inpatient   Certification Statement: The patient will continue to require additional inpatient hospital stay due to hypoxia, placement    Discharge Plan: pending approval, need pt/ot     Code Status: Level 1 - Full Code      Subjective:   Patient is doing well  No questions       Objective:     Vitals:   Temp (24hrs), Av °F (36 7 °C), Min:97 7 °F (36 5 °C), Max:98 2 °F (36 8 °C)    Temp:  [97 7 °F (36 5 °C)-98 2 °F (36 8 °C)] 97 7 °F (36 5 °C)  HR:  [53-65] 65  Resp:  [16-18] 18  BP: (139-165)/(60-86) 139/60  SpO2:  [88 %-92 %] 89 %  Body mass index is 40 23 kg/m²  Input and Output Summary (last 24 hours): Intake/Output Summary (Last 24 hours) at 7/5/2020 1448  Last data filed at 7/5/2020 0900  Gross per 24 hour   Intake 120 ml   Output --   Net 120 ml       Physical Exam:     Physical Exam   Constitutional: He is oriented to person, place, and time  He appears well-developed and well-nourished  HENT:   Head: Normocephalic and atraumatic  Right Ear: External ear normal    Nose: Nose normal    Mouth/Throat: Oropharynx is clear and moist  No oropharyngeal exudate  Eyes: Pupils are equal, round, and reactive to light  Conjunctivae and EOM are normal  Right eye exhibits no discharge  Left eye exhibits no discharge  No scleral icterus  Neck: Normal range of motion  Neck supple  No thyromegaly present  Cardiovascular: Normal rate, regular rhythm, normal heart sounds and intact distal pulses  Exam reveals no gallop and no friction rub  No murmur heard  Pulmonary/Chest: No stridor  He is in respiratory distress  He has wheezes  He has no rales  Abdominal: Soft  Bowel sounds are normal  He exhibits no distension  Musculoskeletal: Normal range of motion  He exhibits no edema, tenderness or deformity  Neurological: He is alert and oriented to person, place, and time  He displays normal reflexes  No cranial nerve deficit  He exhibits normal muscle tone  Coordination normal    Skin: Skin is warm and dry  No erythema  No pallor  Psychiatric: He has a normal mood and affect  His behavior is normal    Nursing note and vitals reviewed        Additional Data:     Labs:    Results from last 7 days   Lab Units 07/04/20  0513  07/02/20  0607 07/01/20  0558   WBC Thousand/uL 5 68   < > 4 38 5 05   HEMOGLOBIN g/dL 11 8*   < > 11 1* 11 2*   HEMATOCRIT % 38 8   < > 36 5 37 6   PLATELETS Thousands/uL 247   < > 220 204   NEUTROS PCT %  --   --   --  90*   LYMPHS PCT %  --   --   --  6* LYMPHO PCT %  --   --  1*  --    MONOS PCT %  --   --   --  3*   MONO PCT %  --   --  3*  --    EOS PCT %  --   --  0 0    < > = values in this interval not displayed  Results from last 7 days   Lab Units 07/04/20  0513  07/02/20  0607   POTASSIUM mmol/L 3 9   < > 3 8   CHLORIDE mmol/L 106   < > 106   CO2 mmol/L 35*   < > 33*   BUN mg/dL 65*   < > 75*   CREATININE mg/dL 1 11   < > 1 28   CALCIUM mg/dL 9 4   < > 9 1   ALK PHOS U/L  --   --  179*   ALT U/L  --   --  48   AST U/L  --   --  52*    < > = values in this interval not displayed  Results from last 7 days   Lab Units 06/29/20  0558   INR  2 74*       * I Have Reviewed All Lab Data Listed Above  * Additional Pertinent Lab Tests Reviewed:  [address] Admission Reviewed    Imaging:    Imaging Reports Reviewed Today Include:    Imaging Personally Reviewed by Myself Includes:       Recent Cultures (last 7 days):           Last 24 Hours Medication List:     Current Facility-Administered Medications:  acetaminophen 650 mg Oral Q6H PRN Hortensia Martin MD   albuterol 2 puff Inhalation Q6H PRN Hortensia Martin MD   atorvastatin 40 mg Oral After Scott Burgos MD   bisacodyl 10 mg Rectal Daily PRN Hortensia Martin MD   cholecalciferol 2,000 Units Oral Daily Hortensia Martin MD   clotrimazole  Topical Q12H April Johnson MD   fluticasone-vilanterol 1 puff Inhalation Daily Hortensia Martin MD   insulin glargine 35 Units Subcutaneous HS Aníbal Barker MD   insulin lispro 1-5 Units Subcutaneous HS Rojelio Love PA-C   insulin lispro 12 Units Subcutaneous TID With Meals Aníbal Barker MD   insulin lispro 2-12 Units Subcutaneous TID AC Hortensia Martin MD   magnesium hydroxide 30 mL Oral Daily PRN Hortensia Martin MD   melatonin 6 mg Oral HS Tuesday M NICOLA Heath   metoprolol tartrate 25 mg Oral Q12H MD Cecy Tavares ANTIFUNGAL 1 application Topical TID Aníbal Barker MD multivitamin-minerals 1 tablet Oral Daily Esthela Chew MD   torsemide 20 mg Oral BID (diuretic) Estella Elizondo MD   warfarin 5 mg Oral Daily (warfarin) Estella Elizondo MD        Today, Patient Was Seen By: Jayce Marr MD    ** Please Note: Dictation voice to text software may have been used in the creation of this document   **

## 2020-07-05 NOTE — ASSESSMENT & PLAN NOTE
· Pt presents with 1 week symptoms at [facility]  · Currently receiving COVID moderate treatment algorithm  · stopping ceftriaxone and doxycycline, procalcitonin is negative   · Continue vitamin-C, vitamin-D, zinc supplementation  Monitor temps, WBC,inflammatory markers still up, continue steroid for 10 days    He is on 4 liters nasal canula now

## 2020-07-05 NOTE — ASSESSMENT & PLAN NOTE
· Rate controlled with metoprolol: can consider decrease dose with mild bradycardia   · Warfarin for anticoagulation  · INR is increasing, likely secondary to antibiotics, check iNR in the morning

## 2020-07-05 NOTE — ASSESSMENT & PLAN NOTE
Lab Results   Component Value Date    HGBA1C 8 8 (H) 06/25/2020       Recent Labs     07/04/20  1557 07/04/20  2045 07/05/20  0800 07/05/20  1142   POCGLU 223* 223* 178* 214*       Blood Sugar Average: Last 72 hrs:  (P) 154 8125   · Uncontrolled due to IV steroid use  · Improved today  · Diabetic diet  · Will decrease insulin today as the patient had some mild hypoglycemia  · Continue sliding scale coverage

## 2020-07-06 ENCOUNTER — HOSPITAL ENCOUNTER (OUTPATIENT)
Dept: INFUSION CENTER | Facility: HOSPITAL | Age: 70
Discharge: HOME | End: 2020-07-06
Attending: INTERNAL MEDICINE

## 2020-07-06 LAB
ANION GAP SERPL CALCULATED.3IONS-SCNC: 7 MMOL/L (ref 4–13)
BASOPHILS # BLD AUTO: 0.03 THOUSANDS/ΜL (ref 0–0.1)
BASOPHILS NFR BLD AUTO: 0 % (ref 0–1)
BUN SERPL-MCNC: 62 MG/DL (ref 5–25)
CALCIUM SERPL-MCNC: 9.6 MG/DL (ref 8.3–10.1)
CHLORIDE SERPL-SCNC: 105 MMOL/L (ref 100–108)
CO2 SERPL-SCNC: 32 MMOL/L (ref 21–32)
CREAT SERPL-MCNC: 1.19 MG/DL (ref 0.6–1.3)
EOSINOPHIL # BLD AUTO: 0 THOUSAND/ΜL (ref 0–0.61)
EOSINOPHIL NFR BLD AUTO: 0 % (ref 0–6)
ERYTHROCYTE [DISTWIDTH] IN BLOOD BY AUTOMATED COUNT: 15.3 % (ref 11.6–15.1)
GFR SERPL CREATININE-BSD FRML MDRD: 63 ML/MIN/1.73SQ M
GLUCOSE SERPL-MCNC: 107 MG/DL (ref 65–140)
GLUCOSE SERPL-MCNC: 119 MG/DL (ref 65–140)
GLUCOSE SERPL-MCNC: 142 MG/DL (ref 65–140)
GLUCOSE SERPL-MCNC: 69 MG/DL (ref 65–140)
GLUCOSE SERPL-MCNC: 82 MG/DL (ref 65–140)
GLUCOSE SERPL-MCNC: 85 MG/DL (ref 65–140)
HCT VFR BLD AUTO: 42.9 % (ref 36.5–49.3)
HGB BLD-MCNC: 13.2 G/DL (ref 12–17)
IMM GRANULOCYTES # BLD AUTO: 0.15 THOUSAND/UL (ref 0–0.2)
IMM GRANULOCYTES NFR BLD AUTO: 2 % (ref 0–2)
LYMPHOCYTES # BLD AUTO: 0.42 THOUSANDS/ΜL (ref 0.6–4.47)
LYMPHOCYTES NFR BLD AUTO: 5 % (ref 14–44)
MCH RBC QN AUTO: 27.1 PG (ref 26.8–34.3)
MCHC RBC AUTO-ENTMCNC: 30.8 G/DL (ref 31.4–37.4)
MCV RBC AUTO: 88 FL (ref 82–98)
MONOCYTES # BLD AUTO: 0.15 THOUSAND/ΜL (ref 0.17–1.22)
MONOCYTES NFR BLD AUTO: 2 % (ref 4–12)
NEUTROPHILS # BLD AUTO: 8.23 THOUSANDS/ΜL (ref 1.85–7.62)
NEUTS SEG NFR BLD AUTO: 91 % (ref 43–75)
NRBC BLD AUTO-RTO: 0 /100 WBCS
PLATELET # BLD AUTO: 264 THOUSANDS/UL (ref 149–390)
PMV BLD AUTO: 11.3 FL (ref 8.9–12.7)
POTASSIUM SERPL-SCNC: 4 MMOL/L (ref 3.5–5.3)
RBC # BLD AUTO: 4.87 MILLION/UL (ref 3.88–5.62)
SODIUM SERPL-SCNC: 144 MMOL/L (ref 136–145)
WBC # BLD AUTO: 8.98 THOUSAND/UL (ref 4.31–10.16)

## 2020-07-06 PROCEDURE — 94760 N-INVAS EAR/PLS OXIMETRY 1: CPT

## 2020-07-06 PROCEDURE — 80048 BASIC METABOLIC PNL TOTAL CA: CPT | Performed by: INTERNAL MEDICINE

## 2020-07-06 PROCEDURE — 82948 REAGENT STRIP/BLOOD GLUCOSE: CPT

## 2020-07-06 PROCEDURE — 99233 SBSQ HOSP IP/OBS HIGH 50: CPT | Performed by: INTERNAL MEDICINE

## 2020-07-06 PROCEDURE — 85025 COMPLETE CBC W/AUTO DIFF WBC: CPT | Performed by: INTERNAL MEDICINE

## 2020-07-06 RX ADMIN — MELATONIN 2000 UNITS: at 08:00

## 2020-07-06 RX ADMIN — INSULIN GLARGINE 35 UNITS: 100 INJECTION, SOLUTION SUBCUTANEOUS at 23:30

## 2020-07-06 RX ADMIN — MICONAZOLE NITRATE 1 APPLICATION: 20 CREAM TOPICAL at 21:36

## 2020-07-06 RX ADMIN — TORSEMIDE 20 MG: 20 TABLET ORAL at 07:48

## 2020-07-06 RX ADMIN — FLUTICASONE FUROATE AND VILANTEROL TRIFENATATE 1 PUFF: 200; 25 POWDER RESPIRATORY (INHALATION) at 08:00

## 2020-07-06 RX ADMIN — Medication 1 TABLET: at 08:00

## 2020-07-06 RX ADMIN — ATORVASTATIN CALCIUM 40 MG: 40 TABLET, FILM COATED ORAL at 17:30

## 2020-07-06 RX ADMIN — MELATONIN 6 MG: at 21:36

## 2020-07-06 RX ADMIN — CLOTRIMAZOLE: 10 CREAM TOPICAL at 23:18

## 2020-07-06 RX ADMIN — TORSEMIDE 20 MG: 20 TABLET ORAL at 17:30

## 2020-07-06 RX ADMIN — MICONAZOLE NITRATE 1 APPLICATION: 20 CREAM TOPICAL at 17:31

## 2020-07-06 RX ADMIN — CLOTRIMAZOLE: 10 CREAM TOPICAL at 08:00

## 2020-07-06 RX ADMIN — METOPROLOL TARTRATE 25 MG: 25 TABLET, FILM COATED ORAL at 21:36

## 2020-07-06 RX ADMIN — METOPROLOL TARTRATE 25 MG: 25 TABLET, FILM COATED ORAL at 08:00

## 2020-07-06 RX ADMIN — WARFARIN SODIUM 5 MG: 5 TABLET ORAL at 17:30

## 2020-07-06 NOTE — ASSESSMENT & PLAN NOTE
Lab Results   Component Value Date    HGBA1C 8 8 (H) 06/25/2020       Recent Labs     07/05/20  1558 07/05/20  2102 07/06/20  0749 07/06/20  1158   POCGLU 167* 111 85 142*       Blood Sugar Average: Last 72 hrs:  (P) 139 4375   · Uncontrolled due to IV steroid use  · Improved today  · Diabetic diet  · Will decrease insulin today as the patient had some mild hypoglycemia  · Continue sliding scale coverage

## 2020-07-06 NOTE — PROGRESS NOTES
Progress Note - Brenda Graves 1950, 81 y o  male MRN: [id number]    Unit/Bed#: [facility] 821-01 Encounter: [id number]    Primary Care Provider: Gaye Acosta MD   Date and time admitted to hospital: 6/25/2020 11:43 AM        Sepsis due to COVID-19 pneumonia [facility])  Assessment & Plan  · Patient presenting with tachycardia, tachypnea, hypoxia and confirm COVID-19 pneumonia  · procalcitonin is coming down to 0 55 today, he is having no trouble breathing  · Would keep saturations between 89-92%  · Blood cultures negative    · Need    CKD (chronic kidney disease)  Assessment & Plan  · Baseline creatinine around 1 2   · Management as above    Chronic obstructive pulmonary disease, unspecified ([facility] [address] )  Assessment & Plan  · Stable without any wheezing  · Continue home inhalers  Type 2 acute myocardial infarction [facility])  Assessment & Plan  · Likely due to acute respiratory failure from COVID-19 pneumonia  · Patient denies any chest pain or shortness of breath  · Will continue warfarin for anticoagulation given his lack of symptoms at this time  · Outpatient cardiac evaluation when stable  · Hyperglycemia due to steroids    Chronic diastolic congestive heart failure (HCC)  Assessment & Plan  Wt Readings from Last 3 Encounters:   07/06/20 (!) 145 kg (320 lb 5 3 oz)   06/15/20 (!) 155 kg (341 lb 4 4 oz)   06/08/20 (!) 154 kg (340 lb 9 8 oz)     · Volume status is adequate  · Monitor intake and output  · Monitor daily weights  · Will hold torsemide tomorrow due to increased creatinine    Chronic atrial fibrillation (HCC)  Assessment & Plan  · Rate controlled with metoprolol: can consider decrease dose with mild bradycardia   · Warfarin for anticoagulation  · INR is increasing, likely secondary to antibiotics, check iNR in the morning    Essential hypertension  Assessment & Plan  · Well controlled    · Continue metoprolol   · Holding torsemide tomorrow due to increased creatinine today    Diabetes mellitus type 2, uncontrolled [facility])  Assessment & Plan  Lab Results   Component Value Date    HGBA1C 8 8 (H) 2020       Recent Labs     20  1558 20  2102 20  0749 20  1158   POCGLU 167* 111 85 142*       Blood Sugar Average: Last 72 hrs:  (P) 139 4375   · Uncontrolled due to IV steroid use  · Improved today  · Diabetic diet  · Will decrease insulin today as the patient had some mild hypoglycemia  · Continue sliding scale coverage    * Acute respiratory failure with hypoxia due to Pneumonia due to COVID-19 virus  Assessment & Plan  · Pt presents with 1 week symptoms at [facility]  · Currently receiving COVID moderate treatment algorithm  · stopping ceftriaxone and doxycycline, procalcitonin is negative   · Continue vitamin-C, vitamin-D, zinc supplementation  Monitor temps, WBC,inflammatory markers still up, continue steroid for 10 days  He is on 4 liters nasal canula now        VTE Pharmacologic Prophylaxis:   Pharmacologic: Warfarin (Coumadin)  Mechanical VTE Prophylaxis in Place: Yes    Patient Centered Rounds: I have performed bedside rounds with nursing staff today  Discussions with Specialists or Other Care Team Provider:     Education and Discussions with Family / Patient: patient     Time Spent for Care: 45 minutes  More than 50% of total time spent on counseling and coordination of care as described above  Current Length of Stay: 11 day(s)    Current Patient Status: Inpatient   Certification Statement: The patient will continue to require additional inpatient hospital stay due to covid    Discharge Plan: discharge to rehab    Code Status: Level 1 - Full Code      Subjective:   Patient is doing well  Oxygen requirements are getting better       Objective:     Vitals:   Temp (24hrs), Av 1 °F (36 7 °C), Min:97 6 °F (36 4 °C), Max:98 4 °F (36 9 °C)    Temp:  [97 6 °F (36 4 °C)-98 4 °F (36 9 °C)] 98 2 °F (36 8 °C)  HR:  [59-71] 59  Resp:  [12-18] 12  BP: (135-139)/(60-74) 139/74  SpO2:  [88 %-94 %] 92 %  Body mass index is 40 04 kg/m²  Input and Output Summary (last 24 hours): Intake/Output Summary (Last 24 hours) at 7/6/2020 1540  Last data filed at 7/6/2020 0901  Gross per 24 hour   Intake 120 ml   Output 500 ml   Net -380 ml       Physical Exam:     Physical Exam   Constitutional: He is oriented to person, place, and time  He appears well-developed and well-nourished  HENT:   Head: Normocephalic and atraumatic  Right Ear: External ear normal    Left Ear: External ear normal    Nose: Nose normal    Mouth/Throat: Oropharynx is clear and moist  No oropharyngeal exudate  Eyes: Pupils are equal, round, and reactive to light  Conjunctivae and EOM are normal  Left eye exhibits no discharge  No scleral icterus  Neck: Normal range of motion  Neck supple  No JVD present  No tracheal deviation present  No thyromegaly present  Cardiovascular: Normal rate, regular rhythm, normal heart sounds and intact distal pulses  Exam reveals no gallop and no friction rub  No murmur heard  Pulmonary/Chest: Effort normal and breath sounds normal  No stridor  No respiratory distress  He has no wheezes  He has no rales  Abdominal: Soft  Bowel sounds are normal  He exhibits no distension and no mass  There is no tenderness  There is no guarding  Musculoskeletal: Normal range of motion  He exhibits edema  He exhibits no tenderness or deformity  Left leg amputation   Neurological: He is oriented to person, place, and time  He displays normal reflexes  No cranial nerve deficit or sensory deficit  He exhibits normal muscle tone  Coordination normal    Skin: Skin is warm and dry  No rash noted  No erythema  No pallor  Psychiatric: He has a normal mood and affect  His behavior is normal  Judgment and thought content normal    Nursing note and vitals reviewed        Additional Data:     Labs:    Results from last 7 days   Lab Units 07/06/20  0557   WBC Thousand/uL 8 98   HEMOGLOBIN g/dL 13  2   HEMATOCRIT % 42 9   PLATELETS Thousands/uL 264   NEUTROS PCT % 91*   LYMPHS PCT % 5*   MONOS PCT % 2*   EOS PCT % 0     Results from last 7 days   Lab Units 07/06/20  0557  07/02/20  0607   POTASSIUM mmol/L 4 0   < > 3 8   CHLORIDE mmol/L 105   < > 106   CO2 mmol/L 32   < > 33*   BUN mg/dL 62*   < > 75*   CREATININE mg/dL 1 19   < > 1 28   CALCIUM mg/dL 9 6   < > 9 1   ALK PHOS U/L  --   --  179*   ALT U/L  --   --  48   AST U/L  --   --  52*    < > = values in this interval not displayed  * I Have Reviewed All Lab Data Listed Above  * Additional Pertinent Lab Tests Reviewed:  [address] Admission Reviewed    Imaging:    Imaging Reports Reviewed Today Include:    Imaging Personally Reviewed by Myself Includes:  *     Recent Cultures (last 7 days):           Last 24 Hours Medication List:     Current Facility-Administered Medications:  acetaminophen 650 mg Oral Q6H PRN Naima Lucia MD   albuterol 2 puff Inhalation Q6H PRN Naima Lucia MD   atorvastatin 40 mg Oral After Shanel De La Cruz MD   bisacodyl 10 mg Rectal Daily PRN Naima Lucia MD   cholecalciferol 2,000 Units Oral Daily Naima Lucia MD   clotrimazole  Topical Q12H Marco Antonio Eugene MD   fluticasone-vilanterol 1 puff Inhalation Daily Naima Lucia MD   insulin glargine 35 Units Subcutaneous HS Adonay Bhatt MD   insulin lispro 1-5 Units Subcutaneous HS Kita Villeda PA-C   insulin lispro 12 Units Subcutaneous TID With Meals Adonay Bhatt MD   insulin lispro 2-12 Units Subcutaneous TID AC Naima Lucia MD   magnesium hydroxide 30 mL Oral Daily PRN Naima Lucia MD   melatonin 6 mg Oral HS Tuesday M NICOLA Heath   metoprolol tartrate 25 mg Oral Q12H MD Rik Gold Gemma ANTIFUNGAL 1 application Topical TID Adonay Bhatt MD   multivitamin-minerals 1 tablet Oral Daily Naima Lucia MD   torsemide 20 mg Oral BID (diuretic) Adonay Bhatt MD warfarin 5 mg Oral Daily (warfarin) Albino Marquez MD        Today, Patient Was Seen By: Moe Neal MD    ** Please Note: Dictation voice to text software may have been used in the creation of this document   **

## 2020-07-06 NOTE — NURSING NOTE
Pt Spo2 86/87% while on 6L, pt laying to his left side  Pt denies any sob  Lungs clear but diminished  Respiratory and SLIM notified  Pt increased to 10L midflow   Spo2 now 90%

## 2020-07-06 NOTE — RESTORATIVE TECHNICIAN NOTE
Restorative Specialist Mobility Note       Activity: Other (Comment)(Pt refusal encouraged and educated pt on importance of mobility )

## 2020-07-06 NOTE — ASSESSMENT & PLAN NOTE
· Pt presents with 1 week symptoms at [facility]  · Currently receiving COVID moderate treatment algorithm  · stopping ceftriaxone and doxycycline, procalcitonin is negative   · Continue vitamin-C, vitamin-D, zinc supplementation  Monitor temps, WBC,inflammatory markers still up, continue steroid for 10 days    He is on 4 liters nasal canula now

## 2020-07-06 NOTE — PLAN OF CARE
Problem: PAIN - ADULT  Goal: Verbalizes/displays adequate comfort level or baseline comfort level  Description  Interventions:  - Encourage patient to monitor pain and request assistance  - Assess pain using appropriate pain scale  - Administer analgesics based on type and severity of pain and evaluate response  - Implement non-pharmacological measures as appropriate and evaluate response  - Consider cultural and social influences on pain and pain management  - Notify physician/advanced practitioner if interventions unsuccessful or patient reports new pain  Outcome: Progressing     Problem: INFECTION - ADULT  Goal: Absence or prevention of progression during hospitalization  Description  INTERVENTIONS:  - Assess and monitor for signs and symptoms of infection  - Monitor lab/diagnostic results  - Monitor all insertion sites, i e  indwelling lines, tubes, and drains  - Monitor endotracheal if appropriate and nasal secretions for changes in amount and color  - [place] appropriate cooling/warming therapies per order  - Administer medications as ordered  - Instruct and encourage patient and family to use good hand hygiene technique  - Identify and instruct in appropriate isolation precautions for identified infection/condition  Outcome: Progressing  Goal: Absence of fever/infection during neutropenic period  Description  INTERVENTIONS:  - Monitor WBC    Outcome: Progressing     Problem: SAFETY ADULT  Goal: Patient will remain free of falls  Description  INTERVENTIONS:  - Assess patient frequently for physical needs  -  Identify cognitive and physical deficits and behaviors that affect risk of falls    -  [place] fall precautions as indicated by assessment   - Educate patient/family on patient safety including physical limitations  - Instruct patient to call for assistance with activity based on assessment  - Modify environment to reduce risk of injury  - Consider OT/PT consult to assist with strengthening/mobility  Outcome: Progressing  Goal: Maintain or return to baseline ADL function  Description  INTERVENTIONS:  -  Assess patient's ability to carry out ADLs; assess patient's baseline for ADL function and identify physical deficits which impact ability to perform ADLs (bathing, care of mouth/teeth, toileting, grooming, dressing, etc )  - Assess/evaluate cause of self-care deficits   - Assess range of motion  - Assess patient's mobility; develop plan if impaired  - Assess patient's need for assistive devices and provide as appropriate  - Encourage maximum independence but intervene and supervise when necessary  - Involve family in performance of ADLs  - Assess for home care needs following discharge   - Consider OT consult to assist with ADL evaluation and planning for discharge  - Provide patient education as appropriate  Outcome: Progressing  Goal: Maintain or return mobility status to optimal level  Description  INTERVENTIONS:  - Assess patient's baseline mobility status (ambulation, transfers, stairs, etc )    - Identify cognitive and physical deficits and behaviors that affect mobility  - Identify mobility aids required to assist with transfers and/or ambulation (gait belt, sit-to-stand, lift, walker, cane, etc )  - [place] fall precautions as indicated by assessment  - Record patient progress and toleration of activity level on Mobility SBAR; progress patient to next Phase/Stage  - Instruct patient to call for assistance with activity based on assessment  - Consider rehabilitation consult to assist with strengthening/weightbearing, etc   Outcome: Progressing     Problem: Prexisting or High Potential for Compromised Skin Integrity  Goal: Skin integrity is maintained or improved  Description  INTERVENTIONS:  - Identify patients at risk for skin breakdown  - Assess and monitor skin integrity  - Assess and monitor nutrition and hydration status  - Monitor labs   - Assess for incontinence   - Turn and reposition patient  - Assist with mobility/ambulation  - Relieve pressure over bony prominences  - Avoid friction and shearing  - Provide appropriate hygiene as needed including keeping skin clean and dry  - Evaluate need for skin moisturizer/barrier cream  - Collaborate with interdisciplinary team   - Patient/family teaching  - Consider wound care consult   Outcome: Progressing     Problem: Potential for Falls  Goal: Patient will remain free of falls  Description  INTERVENTIONS:  - Assess patient frequently for physical needs  -  Identify cognitive and physical deficits and behaviors that affect risk of falls    -  [place] fall precautions as indicated by assessment   - Educate patient/family on patient safety including physical limitations  - Instruct patient to call for assistance with activity based on assessment  - Modify environment to reduce risk of injury  - Consider OT/PT consult to assist with strengthening/mobility  Outcome: Progressing

## 2020-07-06 NOTE — ASSESSMENT & PLAN NOTE
Wt Readings from Last 3 Encounters:   07/06/20 (!) 145 kg (320 lb 5 3 oz)   06/15/20 (!) 155 kg (341 lb 4 4 oz)   06/08/20 (!) 154 kg (340 lb 9 8 oz)     · Volume status is adequate  · Monitor intake and output  · Monitor daily weights  · Will hold torsemide tomorrow due to increased creatinine

## 2020-07-06 NOTE — SOCIAL WORK
CM called pt in is room to discuss STR choices as CM department provided list on Thursday for pt to review  Pt appeared confused and reported that he does not remember getting a list of STRs  CM called pt's emergency contact/sig other, Dwight Varma ([phone]) and left a voicemail requesting a return call  CM called pt's other emergency contact/son, Annel Saavedra ([phone]) and spoke with him regarding pt's discharge plan  Pt's son reports that pt told him he received a STR list last week but now does not remember  Pt's son reports that he feels as though pt is having worsening memory issues  Pt's son amenable to referrals to all facilities accepting COVID+ pts at this time  Referrals placed via [facility] and list emailed to pt's son at [email or web address]     Pt's son asked that he be the point of contact is pt is slightly confused   department to follow

## 2020-07-07 ENCOUNTER — TELEPHONE (OUTPATIENT)
Dept: NEPHROLOGY | Facility: CLINIC | Age: 70
End: 2020-07-07

## 2020-07-07 LAB
ANION GAP SERPL CALCULATED.3IONS-SCNC: 8 MMOL/L (ref 4–13)
ATRIAL RATE: 441 BPM
BUN SERPL-MCNC: 66 MG/DL (ref 5–25)
CALCIUM SERPL-MCNC: 8.7 MG/DL (ref 8.3–10.1)
CHLORIDE SERPL-SCNC: 101 MMOL/L (ref 100–108)
CO2 SERPL-SCNC: 33 MMOL/L (ref 21–32)
CREAT SERPL-MCNC: 1.19 MG/DL (ref 0.6–1.3)
ERYTHROCYTE [DISTWIDTH] IN BLOOD BY AUTOMATED COUNT: 15.5 % (ref 11.6–15.1)
GFR SERPL CREATININE-BSD FRML MDRD: 63 ML/MIN/1.73SQ M
GLUCOSE SERPL-MCNC: 114 MG/DL (ref 65–140)
GLUCOSE SERPL-MCNC: 168 MG/DL (ref 65–140)
GLUCOSE SERPL-MCNC: 205 MG/DL (ref 65–140)
GLUCOSE SERPL-MCNC: 50 MG/DL (ref 65–140)
GLUCOSE SERPL-MCNC: 58 MG/DL (ref 65–140)
GLUCOSE SERPL-MCNC: 89 MG/DL (ref 65–140)
GLUCOSE SERPL-MCNC: 98 MG/DL (ref 65–140)
HCT VFR BLD AUTO: 41.4 % (ref 36.5–49.3)
HGB BLD-MCNC: 13 G/DL (ref 12–17)
INR PPP: 6.32 (ref 0.84–1.19)
MCH RBC QN AUTO: 27.7 PG (ref 26.8–34.3)
MCHC RBC AUTO-ENTMCNC: 31.4 G/DL (ref 31.4–37.4)
MCV RBC AUTO: 88 FL (ref 82–98)
PLATELET # BLD AUTO: 248 THOUSANDS/UL (ref 149–390)
PMV BLD AUTO: 11.2 FL (ref 8.9–12.7)
POTASSIUM SERPL-SCNC: 3.6 MMOL/L (ref 3.5–5.3)
PROTHROMBIN TIME: 55.1 SECONDS (ref 11.6–14.5)
QRS AXIS: 46 DEGREES
QRSD INTERVAL: 110 MS
QT INTERVAL: 450 MS
QTC INTERVAL: 492 MS
RBC # BLD AUTO: 4.7 MILLION/UL (ref 3.88–5.62)
SODIUM SERPL-SCNC: 142 MMOL/L (ref 136–145)
T WAVE AXIS: 121 DEGREES
VENTRICULAR RATE: 72 BPM
WBC # BLD AUTO: 6.37 THOUSAND/UL (ref 4.31–10.16)

## 2020-07-07 PROCEDURE — 93005 ELECTROCARDIOGRAM TRACING: CPT

## 2020-07-07 PROCEDURE — 97530 THERAPEUTIC ACTIVITIES: CPT

## 2020-07-07 PROCEDURE — 82948 REAGENT STRIP/BLOOD GLUCOSE: CPT

## 2020-07-07 PROCEDURE — 85610 PROTHROMBIN TIME: CPT | Performed by: PHYSICIAN ASSISTANT

## 2020-07-07 PROCEDURE — 94760 N-INVAS EAR/PLS OXIMETRY 1: CPT

## 2020-07-07 PROCEDURE — 93010 ELECTROCARDIOGRAM REPORT: CPT | Performed by: INTERNAL MEDICINE

## 2020-07-07 PROCEDURE — 99232 SBSQ HOSP IP/OBS MODERATE 35: CPT | Performed by: HOSPITALIST

## 2020-07-07 PROCEDURE — 80048 BASIC METABOLIC PNL TOTAL CA: CPT | Performed by: PHYSICIAN ASSISTANT

## 2020-07-07 PROCEDURE — 85027 COMPLETE CBC AUTOMATED: CPT | Performed by: PHYSICIAN ASSISTANT

## 2020-07-07 PROCEDURE — 97535 SELF CARE MNGMENT TRAINING: CPT

## 2020-07-07 RX ORDER — INSULIN GLARGINE 100 [IU]/ML
32 INJECTION, SOLUTION SUBCUTANEOUS
Status: DISCONTINUED | OUTPATIENT
Start: 2020-07-07 | End: 2020-07-07

## 2020-07-07 RX ORDER — INSULIN GLARGINE 100 [IU]/ML
30 INJECTION, SOLUTION SUBCUTANEOUS
Status: DISCONTINUED | OUTPATIENT
Start: 2020-07-07 | End: 2020-07-14 | Stop reason: HOSPADM

## 2020-07-07 RX ADMIN — CLOTRIMAZOLE: 10 CREAM TOPICAL at 09:27

## 2020-07-07 RX ADMIN — FLUTICASONE FUROATE AND VILANTEROL TRIFENATATE 1 PUFF: 200; 25 POWDER RESPIRATORY (INHALATION) at 09:27

## 2020-07-07 RX ADMIN — INSULIN GLARGINE 30 UNITS: 100 INJECTION, SOLUTION SUBCUTANEOUS at 22:21

## 2020-07-07 RX ADMIN — MELATONIN 6 MG: at 22:21

## 2020-07-07 RX ADMIN — MICONAZOLE NITRATE 1 APPLICATION: 20 CREAM TOPICAL at 09:27

## 2020-07-07 RX ADMIN — CLOTRIMAZOLE 1 APPLICATION: 10 CREAM TOPICAL at 22:20

## 2020-07-07 RX ADMIN — INSULIN LISPRO 1 UNITS: 100 INJECTION, SOLUTION INTRAVENOUS; SUBCUTANEOUS at 22:21

## 2020-07-07 RX ADMIN — INSULIN LISPRO 4 UNITS: 100 INJECTION, SOLUTION INTRAVENOUS; SUBCUTANEOUS at 17:21

## 2020-07-07 RX ADMIN — TORSEMIDE 20 MG: 20 TABLET ORAL at 17:21

## 2020-07-07 RX ADMIN — MICONAZOLE NITRATE 1 APPLICATION: 20 CREAM TOPICAL at 17:22

## 2020-07-07 RX ADMIN — TORSEMIDE 20 MG: 20 TABLET ORAL at 09:25

## 2020-07-07 RX ADMIN — MICONAZOLE NITRATE 1 APPLICATION: 20 CREAM TOPICAL at 22:21

## 2020-07-07 RX ADMIN — ATORVASTATIN CALCIUM 40 MG: 40 TABLET, FILM COATED ORAL at 17:21

## 2020-07-07 RX ADMIN — Medication 1 TABLET: at 09:25

## 2020-07-07 RX ADMIN — MELATONIN 2000 UNITS: at 09:25

## 2020-07-07 NOTE — ASSESSMENT & PLAN NOTE
Lab Results   Component Value Date    HGBA1C 8 8 (H) 06/25/2020       Recent Labs     07/07/20  0822 07/07/20  0929 07/07/20  1153 07/07/20  1616   POCGLU 50* 114 98 205*       Blood Sugar Average: Last 72 hrs:  (P) 136 [id number]   · Uncontrolled due to IV steroid use  · now off steroids & low BS   · Diabetic diet  · Will stop the Humalog and decrease Lantus to 30 units HS to allow blood sugars to rise and then will adjust again  · Continue sliding scale coverage

## 2020-07-07 NOTE — PLAN OF CARE
Problem: PAIN - ADULT  Goal: Verbalizes/displays adequate comfort level or baseline comfort level  Description  Interventions:  - Encourage patient to monitor pain and request assistance  - Assess pain using appropriate pain scale  - Administer analgesics based on type and severity of pain and evaluate response  - Implement non-pharmacological measures as appropriate and evaluate response  - Consider cultural and social influences on pain and pain management  - Notify physician/advanced practitioner if interventions unsuccessful or patient reports new pain  Outcome: Progressing     Problem: INFECTION - ADULT  Goal: Absence or prevention of progression during hospitalization  Description  INTERVENTIONS:  - Assess and monitor for signs and symptoms of infection  - Monitor lab/diagnostic results  - Monitor all insertion sites, i e  indwelling lines, tubes, and drains  - Monitor endotracheal if appropriate and nasal secretions for changes in amount and color  - [place] appropriate cooling/warming therapies per order  - Administer medications as ordered  - Instruct and encourage patient and family to use good hand hygiene technique  - Identify and instruct in appropriate isolation precautions for identified infection/condition  Outcome: Progressing  Goal: Absence of fever/infection during neutropenic period  Description  INTERVENTIONS:  - Monitor WBC    Outcome: Progressing     Problem: SAFETY ADULT  Goal: Patient will remain free of falls  Description  INTERVENTIONS:  - Assess patient frequently for physical needs  -  Identify cognitive and physical deficits and behaviors that affect risk of falls    -  [place] fall precautions as indicated by assessment   - Educate patient/family on patient safety including physical limitations  - Instruct patient to call for assistance with activity based on assessment  - Modify environment to reduce risk of injury  - Consider OT/PT consult to assist with strengthening/mobility  Outcome: Progressing  Goal: Maintain or return to baseline ADL function  Description  INTERVENTIONS:  -  Assess patient's ability to carry out ADLs; assess patient's baseline for ADL function and identify physical deficits which impact ability to perform ADLs (bathing, care of mouth/teeth, toileting, grooming, dressing, etc )  - Assess/evaluate cause of self-care deficits   - Assess range of motion  - Assess patient's mobility; develop plan if impaired  - Assess patient's need for assistive devices and provide as appropriate  - Encourage maximum independence but intervene and supervise when necessary  - Involve family in performance of ADLs  - Assess for home care needs following discharge   - Consider OT consult to assist with ADL evaluation and planning for discharge  - Provide patient education as appropriate  Outcome: Progressing  Goal: Maintain or return mobility status to optimal level  Description  INTERVENTIONS:  - Assess patient's baseline mobility status (ambulation, transfers, stairs, etc )    - Identify cognitive and physical deficits and behaviors that affect mobility  - Identify mobility aids required to assist with transfers and/or ambulation (gait belt, sit-to-stand, lift, walker, cane, etc )  - [place] fall precautions as indicated by assessment  - Record patient progress and toleration of activity level on Mobility SBAR; progress patient to next Phase/Stage  - Instruct patient to call for assistance with activity based on assessment  - Consider rehabilitation consult to assist with strengthening/weightbearing, etc   Outcome: Progressing     Problem: Prexisting or High Potential for Compromised Skin Integrity  Goal: Skin integrity is maintained or improved  Description  INTERVENTIONS:  - Identify patients at risk for skin breakdown  - Assess and monitor skin integrity  - Assess and monitor nutrition and hydration status  - Monitor labs   - Assess for incontinence   - Turn and reposition patient  - Assist with mobility/ambulation  - Relieve pressure over bony prominences  - Avoid friction and shearing  - Provide appropriate hygiene as needed including keeping skin clean and dry  - Evaluate need for skin moisturizer/barrier cream  - Collaborate with interdisciplinary team   - Patient/family teaching  - Consider wound care consult   Outcome: Progressing     Problem: Potential for Falls  Goal: Patient will remain free of falls  Description  INTERVENTIONS:  - Assess patient frequently for physical needs  -  Identify cognitive and physical deficits and behaviors that affect risk of falls  -  [place] fall precautions as indicated by assessment   - Educate patient/family on patient safety including physical limitations  - Instruct patient to call for assistance with activity based on assessment  - Modify environment to reduce risk of injury  - Consider OT/PT consult to assist with strengthening/mobility  Outcome: Progressing     Problem: Potential for Falls  Goal: Patient will remain free of falls  Description  INTERVENTIONS:  - Assess patient frequently for physical needs  -  Identify cognitive and physical deficits and behaviors that affect risk of falls  -  [place] fall precautions as indicated by assessment   - Educate patient/family on patient safety including physical limitations  - Instruct patient to call for assistance with activity based on assessment  - Modify environment to reduce risk of injury  - Consider OT/PT consult to assist with strengthening/mobility  Outcome: Progressing     Problem: Nutrition/Hydration-ADULT  Goal: Nutrient/Hydration intake appropriate for improving, restoring or maintaining nutritional needs  Description  Monitor and assess patient's nutrition/hydration status for malnutrition  Collaborate with interdisciplinary team and initiate plan and interventions as ordered  Monitor patient's weight and dietary intake as ordered or per policy   Utilize nutrition screening tool and intervene as necessary  Determine patient's food preferences and provide high-protein, high-caloric foods as appropriate       INTERVENTIONS:  - Monitor oral intake, urinary output, labs, and treatment plans  - Assess nutrition and hydration status and recommend course of action  - Evaluate amount of meals eaten  - Assist patient with eating if necessary   - Allow adequate time for meals  - Recommend/ encourage appropriate diets, oral nutritional supplements, and vitamin/mineral supplements  - Order, calculate, and assess calorie counts as needed  - Recommend, monitor, and adjust tube feedings and TPN/PPN based on assessed needs  - Assess need for intravenous fluids  - Provide specific nutrition/hydration education as appropriate  - Include patient/family/caregiver in decisions related to nutrition  Outcome: Progressing

## 2020-07-07 NOTE — ASSESSMENT & PLAN NOTE
Wt Readings from Last 3 Encounters:   07/07/20 (!) 142 kg (313 lb 12 8 oz)   06/15/20 (!) 155 kg (341 lb 4 4 oz)   06/08/20 (!) 154 kg (340 lb 9 8 oz)     · Volume status is adequate  · Monitor intake and output  · Monitor daily weights  · Continue torsemide

## 2020-07-07 NOTE — ASSESSMENT & PLAN NOTE
· Patient presenting with tachycardia, tachypnea, hypoxia and confirm COVID-19 pneumonia    · Now improved

## 2020-07-07 NOTE — PLAN OF CARE
Problem: OCCUPATIONAL THERAPY ADULT  Goal: Performs self-care activities at highest level of function for planned discharge setting  See evaluation for individualized goals  Description  Treatment Interventions: ADL retraining, Functional transfer training, UE strengthening/ROM, Endurance training, Cognitive reorientation, Patient/family training, Equipment evaluation/education, Compensatory technique education, Activityengagement, Energy conservation          See flowsheet documentation for full assessment, interventions and recommendations  Outcome: Progressing  Note:   Limitation: Decreased ADL status, Decreased endurance, Decreased cognition, Decreased Safe judgement during ADL, Decreased self-care trans, Decreased high-level ADLs  Prognosis: Fair  Assessment: Patient participated in Skilled OT session this date with interventions consisting of functional transfers with slide board, sit to stand transfers, toileting-see assistance levels above   Patient agreeable to OT treatment session, upon arrival patient was found supine in bed  In comparison to previous session, patient with improvements in functional transfers with slide board to chair   Patient requiring verbal cues for safety, verbal cues for correct technique and frequent rest periods  Patient continues to be functioning below baseline level, occupational performance remains limited secondary to factors listed above and increased risk for falls and injury  From OT standpoint, recommendation at time of d/c would be Short Term Rehab  Patient to benefit from continued Occupational Therapy treatment while in the hospital to address deficits as defined above and maximize level of functional independence with ADLs and functional mobility  OT Discharge Recommendation: Post-Acute Rehabilitation Services  OT - OK to Discharge:  Yes

## 2020-07-07 NOTE — OCCUPATIONAL THERAPY NOTE
[address] Progress Note     Patient Name: Khalif OTERO Date: 7/7/2020  Problem List  Principal Problem:    Acute respiratory failure with hypoxia due to Pneumonia due to COVID-19 virus  Active Problems:    Diabetes mellitus type 2, uncontrolled (HCC)    Essential hypertension    Chronic atrial fibrillation (HCC)    Chronic diastolic congestive heart failure (HCC)    Type 2 acute myocardial infarction [facility])    Chronic obstructive pulmonary disease, unspecified (HCC)    CKD (chronic kidney disease)    Sepsis due to COVID-19 pneumonia ([facility] [address] )          07/07/20 0936   Restrictions/Precautions   Weight Bearing Precautions Per Order No   Other Precautions Contact/isolation; Airborne/isolation;Droplet precautions;O2;Fall Risk;Bed Alarm;Cognitive; Chair Alarm  (+COVID-19 confirmed, +6L 02 )   Lifestyle   Autonomy Assistance with ADL's/IADL's, +RW with transfers to w/c, w/c dependent mobility   Reciprocal Relationships facility   Service to Others retired   Intrinsic Gratification joe   Pain Assessment   Pain Assessment Tool Pain Assessment not indicated - pt denies pain   Pain Score No Pain   ADL   Where Assessed Edge of bed   Grooming Assistance 3  Moderate Assistance   Grooming Deficit Brushing hair  (assistance to back for thoroughness)   LB Dressing Assistance 2  Maximal Assistance   LB Dressing Deficit Don/doff R sock   Toileting Assistance  1  Total Assistance   Toileting Deficit Perineal hygiene  (assistance in supine with BM hygiene)   Bed Mobility   Rolling L 4  Minimal assistance   Additional items Assist x 1;Bedrails   Supine to Sit 4  Minimal assistance   Additional items Assist x 1   Sit to Supine 5  Supervision   Additional items Increased time required   Additional Comments pt peformed 2 trials of supine to sit with first Supervision , 2nd with min a and bedrails use into long sitting     Transfers   Sit to Stand 3  Moderate assistance   Additional items Assist x 2 +RW   Stand to Sit 3  Moderate assistance   Additional items Assist x 1   Sliding Board transfer 4  Minimal assistance   Additional items Verbal cues  (bed>drop arm chair to right, SBA of another for safety using slide board -verbal cues to sequence transfer )   Additional Comments pt achieved 50% of upright stance with sit to stand transfer, pt impulsively transferred back to bed for BM toileting, eventhough encouraged pt to sit upright at EOB on bedpan simulating toilet, pt declined  Functional Mobility   Additional Comments Unable/unsafe to assess 2* to left AKA and impaired standing balance  Cognition   Overall Cognitive Status Impaired   Arousal/Participation Alert; Cooperative   Attention Attends with cues to redirect   Orientation Level Oriented to person;Oriented to place;Oriented to situation   Memory Decreased recall of precautions;Decreased recall of recent events;Decreased short term memory   Following Commands Follows one step commands with increased time or repetition   Comments Pt cooperative with therapy, occassional verbal cue for encouragement, min conversant, verbal cues to sequence, problem solve transfers, mobility, toileting steps  Activity Tolerance   Activity Tolerance Patient limited by fatigue   Medical Staff Made Aware RN cleared pt for therapy   Assessment   Assessment Patient participated in Skilled OT session this date with interventions consisting of functional transfers with slide board, sit to stand transfers, toileting-see assistance levels above   Patient agreeable to OT treatment session, upon arrival patient was found supine in bed  In comparison to previous session, patient with improvements in functional transfers with slide board to chair   Patient requiring verbal cues for safety, verbal cues for correct technique and frequent rest periods   Patient continues to be functioning below baseline level, occupational performance remains limited secondary to factors listed above and increased risk for falls and injury  From OT standpoint, recommendation at time of d/c would be Short Term Rehab  Patient to benefit from continued Occupational Therapy treatment while in the hospital to address deficits as defined above and maximize level of functional independence with ADLs and functional mobility  Plan   Treatment Interventions ADL retraining;Functional transfer training;UE strengthening/ROM; Endurance training;Cognitive reorientation;Patient/family training;Equipment evaluation/education; Compensatory technique education; Activityengagement; Energy conservation   Goal Expiration Date 07/13/20   OT Treatment Day 3   OT Frequency 3-5x/wk   Recommendation   OT Discharge Recommendation Post-Acute Rehabilitation Services   OT - OK to Discharge Yes     Roxanne ANDREWS, OTR/L

## 2020-07-07 NOTE — ASSESSMENT & PLAN NOTE
· Rate controlled with metoprolol - decreased to 12 5 b i d  due to heart rate in 40s and 50s  · Warfarin for anticoagulation  · INR of > 6 compared to 2 7 a week ago  · Hold Coumadin

## 2020-07-07 NOTE — PHYSICAL THERAPY NOTE
Physical Therapy Treatment Note    Patient's Name: Umesh Boyer  : 20 1009   Pain Assessment   Pain Assessment Tool Pain Assessment not indicated - pt denies pain   Pain Score No Pain   Restrictions/Precautions   Weight Bearing Precautions Per Order No   Other Precautions Airborne/isolation;Contact/isolation;Cognitive; Chair Alarm; Bed Alarm; Fall Risk  (COVID-19 positive)   General   Chart Reviewed Yes   Family/Caregiver Present No   Cognition   Overall Cognitive Status Impaired   Arousal/Participation Alert; Cooperative   Attention Attends with cues to redirect   Orientation Level Oriented to person;Oriented to place; Disoriented to time;Disoriented to situation   Memory Decreased recall of recent events;Decreased recall of precautions;Decreased short term memory   Following Commands Follows one step commands with increased time or repetition   Comments Pt required encouragement initially to participate with therapy  Bed Mobility   Supine to Sit 4  Minimal assistance   Additional items Assist x 1; Increased time required;Verbal cues   Additional Comments First trial, supervision with bed rail use  Second trial required [place] pull to long sit from [facility] flat   Transfers   Sit to Stand 3  Moderate assistance   Additional items Assist x 2; Increased time required;Verbal cues   Stand to Sit 3  Moderate assistance   Additional items Assist x 2; Increased time required;Verbal cues   Sliding Board transfer 4  Minimal assistance   Additional items Assist x 1; Increased time required;Verbal cues   Additional Comments Sit to stand attempted x1, pt able to achieve approximately 50% standing, self-limiting secondary to urge for BM  Pt then able to transfer with slide board OOB to chair with [place] for control, cues for sequencing, hand placement on slide board, lateral weight shifting for set-up      Balance   Static Sitting Fair   Dynamic Sitting Poor +   Static Standing Poor -   Activity Tolerance   Activity Tolerance Patient limited by fatigue   Medical Staff Made Aware OT, Randi   Nurse Made Aware RN updated  Chair alarm intact   Assessment   Prognosis Fair   Problem List Decreased strength;Decreased endurance; Impaired balance;Decreased mobility; Decreased cognition;Decreased safety awareness; Obesity   Assessment Pt with improved activity tolerance this session, able to perform slide board transfer OOB with slide board with [address]  Pt required encouragement to participate initially, improved motivation once mobility initiated  Pt with stable O2 sats on 5L O2  Pt will benefit from continued skilled PT intervention during course of hospital stay to improve strength, endurance and balance to improve safety with functional mobility  Recommend IP rehab upon hospital D/C  Goals   Patient Goals "to go home"   STG Expiration Date 07/09/20   PT Treatment Day 3   Plan   Treatment/Interventions LE strengthening/ROM; Functional transfer training; Therapeutic exercise; Endurance training;Cognitive reorientation;Patient/family training;Equipment eval/education; Bed mobility   Progress Progressing toward goals   PT Frequency Other (Comment)  (3-5x/week)   Recommendation   PT Discharge Recommendation Post-Acute Rehabilitation Services   PT - OK to Discharge Yes  (to IP rehab)       Chey Arenas, PT, DPT

## 2020-07-07 NOTE — PROGRESS NOTES
Progress Note - Yadira Severe 1950, 81 y o  male MRN: [id number]    [facility] Encounter: [id number]    Primary Care Provider: Nova Wilde MD   Date and time admitted to hospital: 6/25/2020 11:43 AM        Sepsis due to COVID-19 pneumonia [facility])  Assessment & Plan  · Patient presenting with tachycardia, tachypnea, hypoxia and confirm COVID-19 pneumonia  · Now improved    CKD (chronic kidney disease)  Assessment & Plan  · Baseline creatinine around 1 2   · Management as above    Chronic obstructive pulmonary disease, unspecified (HCC)  Assessment & Plan  · Stable without any wheezing  · Continue home inhalers  Type 2 acute myocardial infarction [facility])  Assessment & Plan  · Likely due to acute respiratory failure from COVID-19 pneumonia  · Patient denies any chest pain or shortness of breath  · Will continue warfarin for anticoagulation given his lack of symptoms at this time  · Outpatient cardiac evaluation when stable    Chronic diastolic congestive heart failure [facility])  Assessment & Plan  Wt Readings from Last 3 Encounters:   07/07/20 (!) 142 kg (313 lb 12 8 oz)   06/15/20 (!) 155 kg (341 lb 4 4 oz)   06/08/20 (!) 154 kg (340 lb 9 8 oz)     · Volume status is adequate  · Monitor intake and output  · Monitor daily weights  · Continue torsemide    Chronic atrial fibrillation (HCC)  Assessment & Plan  · Rate controlled with metoprolol - decreased to 12 5 b i d  due to heart rate in 40s and 50s  · Warfarin for anticoagulation  · INR of > 6 compared to 2 7 a week ago  · Hold Coumadin    Essential hypertension  Assessment & Plan  · Well controlled    · Continue metoprolol   · Continue torsemide    Diabetes mellitus type 2, uncontrolled (HCC)  Assessment & Plan  Lab Results   Component Value Date    HGBA1C 8 8 (H) 06/25/2020       Recent Labs     07/07/20  0822 07/07/20  0929 07/07/20  1153 07/07/20  1616   POCGLU 50* 114 98 205*       Blood Sugar Average: Last 72 hrs:  (P) 863 [id number]   · Uncontrolled due to IV steroid use  · now off steroids & low BS   · Diabetic diet  · Will stop the Humalog and decrease Lantus to 30 units HS to allow blood sugars to rise and then will adjust again  · Continue sliding scale coverage    * Acute respiratory failure with hypoxia due to Pneumonia due to COVID-19 virus  Assessment & Plan  · Pt presents with 1 week symptoms at [facility]  · Upon presentation fell in moderate severity for COVID  · stopped ceftriaxone and doxycycline as procalcitonin is negative   · Was treated with IV steroids for 10 days  · Continue vitamin-C, vitamin-D, zinc supplementation  · Continues to require 5-6 L oxygen, baseline not on any oxygen  · Curbside discussed with Infectious Disease, no indication for any other treatment regimen at this point of time at this level of oxygen          [facility] Internal Medicine Progress Note  Patient: Cathy Pop 81 y o  male   MRN: [id number]  PCP: Christopher Mejia MD  Unit/Bed#: [facility] 821-01 Encounter: [id number]  Date Of Visit: 07/07/20    Assessment:    Principal Problem:    Acute respiratory failure with hypoxia due to Pneumonia due to COVID-19 virus  Active Problems:    Diabetes mellitus type 2, uncontrolled ([facility] )    Essential hypertension    Chronic atrial fibrillation (HCC)    Chronic diastolic congestive heart failure (HCC)    [facility] )    Chronic obstructive pulmonary disease, unspecified ([facility] )    CKD (chronic kidney disease)    Sepsis due to COVID-19 pneumonia ([facility] )      Plan:         VTE Pharmacologic Prophylaxis:   Pharmacologic: Warfarin (Coumadin)  Mechanical VTE Prophylaxis in Place: Yes    Patient Centered Rounds: I have performed bedside rounds with nursing staff today  Discussions with Specialists or Other Care Team Provider:     Education and Discussions with Family / Patient: patient    Time Spent for Care: 30 minutes    More than 50% of total time spent on counseling and coordination of care as described above  Current Length of Stay: 12 day(s)    Current Patient Status: Inpatient   Certification Statement: The patient will continue to require additional inpatient hospital stay due to pneidng placement    Discharge Plan / Estimated Discharge Date:     Code Status: Level 1 - Full Code      Subjective:   Denies CP or SOB    Objective:     Vitals:   Temp (24hrs), Av 3 °F (36 3 °C), Min:95 8 °F (35 4 °C), Max:98 1 °F (36 7 °C)    Temp:  [95 8 °F (35 4 °C)-98 1 °F (36 7 °C)] 98 1 °F (36 7 °C)  HR:  [46-98] 52  Resp:  [16-22] 22  BP: (119-153)/(77-79) 119/79  SpO2:  [88 %-94 %] 94 %  Body mass index is 39 22 kg/m²  Input and Output Summary (last 24 hours): Intake/Output Summary (Last 24 hours) at 2020 1905  Last data filed at 2020 1621  Gross per 24 hour   Intake 560 ml   Output 1150 ml   Net -590 ml       Physical Exam:     Physical Exam   Constitutional: He appears well-developed and well-nourished  HENT:   Head: Normocephalic and atraumatic  Cardiovascular: Normal rate and regular rhythm  Exam reveals no friction rub  No murmur heard  Pulmonary/Chest: Effort normal and breath sounds normal  No stridor  No respiratory distress  Abdominal: Soft  He exhibits no distension  There is no tenderness  Neurological: He is alert  Vitals reviewed        Additional Data:     Labs:    Results from last 7 days   Lab Units 20  0607 20  0557   WBC Thousand/uL 6 37 8 98   HEMOGLOBIN g/dL 13 0 13 2   HEMATOCRIT % 41 4 42 9   PLATELETS Thousands/uL 248 264   NEUTROS PCT %  --  91*   LYMPHS PCT %  --  5*   MONOS PCT %  --  2*   EOS PCT %  --  0     Results from last 7 days   Lab Units 20  0607  20  0607   POTASSIUM mmol/L 3 6   < > 3 8   CHLORIDE mmol/L 101   < > 106   CO2 mmol/L 33*   < > 33*   BUN mg/dL 66*   < > 75*   CREATININE mg/dL 1 19   < > 1 28   CALCIUM mg/dL 8 7   < > 9 1   ALK PHOS U/L  --   --  179*   ALT U/L  --   -- 48   AST U/L  --   --  52*    < > = values in this interval not displayed  Results from last 7 days   Lab Units 07/07/20  0607   INR  6 32*       * I Have Reviewed All Lab Data Listed Above  * Additional Pertinent Lab Tests Reviewed: All Labs Within Last 24 Hours Reviewed    Imaging:    Imaging Reports Reviewed Today Include:   Imaging Personally Reviewed by Myself Includes:      Recent Cultures (last 7 days):           Last 24 Hours Medication List:     Current Facility-Administered Medications:  acetaminophen 650 mg Oral Q6H PRN Alice Iglesias MD   albuterol 2 puff Inhalation Q6H PRN Alice Iglesias MD   atorvastatin 40 mg Oral After Johnny Godinez MD   bisacodyl 10 mg Rectal Daily PRN Alice Iglesias MD   cholecalciferol 2,000 Units Oral Daily Alice Iglesias MD   clotrimazole  Topical Q12H Mary Dukes MD   fluticasone-vilanterol 1 puff Inhalation Daily Alice Iglesias MD   insulin glargine 30 Units Subcutaneous HS Anibal Brewer MD   insulin lispro 1-5 Units Subcutaneous HS Pj Vanegas PA-C   insulin lispro 2-12 Units Subcutaneous TID AC Alice Iglesias MD   magnesium hydroxide 30 mL Oral Daily PRN Alice Iglesias MD   melatonin 6 mg Oral HS Tuesday M NICOLA Heath   metoprolol tartrate 12 5 mg Oral Q12H Anibal Brewer MD   KHADRA ANTIFUNGAL 1 application Topical TID Piyush Bailey MD   multivitamin-minerals 1 tablet Oral Daily Alice Iglesias MD   torsemide 20 mg Oral BID (diuretic) Piyush Bailey MD        Today, Patient Was Seen By: Anibal Brewer MD    ** Please Note: This note has been constructed using a voice recognition system   **

## 2020-07-07 NOTE — ASSESSMENT & PLAN NOTE
· Pt presents with 1 week symptoms at [facility]  · Upon presentation fell in moderate severity for COVID  · stopped ceftriaxone and doxycycline as procalcitonin is negative   · Was treated with IV steroids for 10 days  · Continue vitamin-C, vitamin-D, zinc supplementation  · Continues to require 5-6 L oxygen, baseline not on any oxygen  · Madie discussed with Infectious Disease, no indication for any other treatment regimen at this point of time at this level of oxygen

## 2020-07-07 NOTE — PLAN OF CARE
Problem: PHYSICAL THERAPY ADULT  Goal: Performs mobility at highest level of function for planned discharge setting  See evaluation for individualized goals  Description  Treatment/Interventions: Functional transfer training, LE strengthening/ROM, Therapeutic exercise, Endurance training, Cognitive reorientation, Patient/family training, Equipment eval/education, Bed mobility          See flowsheet documentation for full assessment, interventions and recommendations  Outcome: Progressing  Note:   Prognosis: Fair  Problem List: Decreased strength, Decreased endurance, Impaired balance, Decreased mobility, Decreased cognition, Decreased safety awareness, Obesity  Assessment: Pt with improved activity tolerance this session, able to perform slide board transfer OOB with slide board with Kinga  Pt required encouragement to participate initially, improved motivation once mobility initiated  Pt with stable O2 sats on 5L O2  Pt will benefit from continued skilled PT intervention during course of hospital stay to improve strength, endurance and balance to improve safety with functional mobility  Recommend IP rehab upon hospital D/C  PT Discharge Recommendation: Post-Acute Rehabilitation Services     PT - OK to Discharge: Yes(to IP rehab)    See flowsheet documentation for full assessment

## 2020-07-08 ENCOUNTER — APPOINTMENT (INPATIENT)
Dept: RADIOLOGY | Facility: HOSPITAL | Age: 70
DRG: 871 | End: 2020-07-08
Attending: HOSPITALIST
Payer: COMMERCIAL

## 2020-07-08 LAB
CRP SERPL QL: 62.8 MG/L
FERRITIN SERPL-MCNC: 985 NG/ML (ref 8–388)
GLUCOSE SERPL-MCNC: 148 MG/DL (ref 65–140)
GLUCOSE SERPL-MCNC: 191 MG/DL (ref 65–140)
GLUCOSE SERPL-MCNC: 197 MG/DL (ref 65–140)
GLUCOSE SERPL-MCNC: 78 MG/DL (ref 65–140)
INR PPP: 5.25 (ref 0.84–1.19)
PROTHROMBIN TIME: 47.8 SECONDS (ref 11.6–14.5)

## 2020-07-08 PROCEDURE — 85610 PROTHROMBIN TIME: CPT | Performed by: HOSPITALIST

## 2020-07-08 PROCEDURE — 82948 REAGENT STRIP/BLOOD GLUCOSE: CPT

## 2020-07-08 PROCEDURE — 82728 ASSAY OF FERRITIN: CPT | Performed by: HOSPITALIST

## 2020-07-08 PROCEDURE — 99232 SBSQ HOSP IP/OBS MODERATE 35: CPT | Performed by: HOSPITALIST

## 2020-07-08 PROCEDURE — 71045 X-RAY EXAM CHEST 1 VIEW: CPT

## 2020-07-08 PROCEDURE — 94760 N-INVAS EAR/PLS OXIMETRY 1: CPT

## 2020-07-08 PROCEDURE — 86140 C-REACTIVE PROTEIN: CPT | Performed by: HOSPITALIST

## 2020-07-08 RX ORDER — FUROSEMIDE 10 MG/ML
40 INJECTION INTRAMUSCULAR; INTRAVENOUS ONCE
Status: COMPLETED | OUTPATIENT
Start: 2020-07-08 | End: 2020-07-08

## 2020-07-08 RX ADMIN — INSULIN LISPRO 1 UNITS: 100 INJECTION, SOLUTION INTRAVENOUS; SUBCUTANEOUS at 22:28

## 2020-07-08 RX ADMIN — MICONAZOLE NITRATE 1 APPLICATION: 20 CREAM TOPICAL at 08:44

## 2020-07-08 RX ADMIN — ATORVASTATIN CALCIUM 40 MG: 40 TABLET, FILM COATED ORAL at 17:34

## 2020-07-08 RX ADMIN — TORSEMIDE 20 MG: 20 TABLET ORAL at 08:33

## 2020-07-08 RX ADMIN — FUROSEMIDE 40 MG: 10 INJECTION, SOLUTION INTRAMUSCULAR; INTRAVENOUS at 20:40

## 2020-07-08 RX ADMIN — CLOTRIMAZOLE 1 APPLICATION: 10 CREAM TOPICAL at 22:39

## 2020-07-08 RX ADMIN — MICONAZOLE NITRATE 1 APPLICATION: 20 CREAM TOPICAL at 22:40

## 2020-07-08 RX ADMIN — INSULIN GLARGINE 30 UNITS: 100 INJECTION, SOLUTION SUBCUTANEOUS at 22:28

## 2020-07-08 RX ADMIN — CLOTRIMAZOLE: 10 CREAM TOPICAL at 08:44

## 2020-07-08 RX ADMIN — INSULIN LISPRO 2 UNITS: 100 INJECTION, SOLUTION INTRAVENOUS; SUBCUTANEOUS at 12:26

## 2020-07-08 RX ADMIN — METOPROLOL TARTRATE 12.5 MG: 25 TABLET ORAL at 08:33

## 2020-07-08 RX ADMIN — MELATONIN 6 MG: at 22:28

## 2020-07-08 RX ADMIN — METOPROLOL TARTRATE 12.5 MG: 25 TABLET ORAL at 22:27

## 2020-07-08 RX ADMIN — MELATONIN 2000 UNITS: at 08:33

## 2020-07-08 RX ADMIN — FLUTICASONE FUROATE AND VILANTEROL TRIFENATATE 1 PUFF: 200; 25 POWDER RESPIRATORY (INHALATION) at 08:35

## 2020-07-08 RX ADMIN — MICONAZOLE NITRATE 1 APPLICATION: 20 CREAM TOPICAL at 16:59

## 2020-07-08 RX ADMIN — TORSEMIDE 20 MG: 20 TABLET ORAL at 17:34

## 2020-07-08 RX ADMIN — Medication 1 TABLET: at 08:33

## 2020-07-08 NOTE — ASSESSMENT & PLAN NOTE
Subjective:       Patient ID:  Stephon Galvan is a 73 y.o. male who presents for   Chief Complaint   Patient presents with    Lesion     Pt c/o bleeding lesion on right posterior lower leg x a few weeks. Tx with mupirocin ointment for 1 week. minimal improvement.pt thinks he scratched his leg with his left toenail.   Pt c/o lesion on right post knee x 1-2 weeks. No bleeding, pain or prev tx.   Pt defers skin check           Review of Systems   Constitutional: Negative for fever and chills.   Skin: Negative for rash and tendency to form keloidal scars.   Hematologic/Lymphatic: Does not bruise/bleed easily.        Objective:    Physical Exam   Constitutional: He appears well-developed and well-nourished. No distress.   Neurological: He is alert and oriented to person, place, and time. He is not disoriented.   Psychiatric: He has a normal mood and affect.   Skin:   Areas Examined (abnormalities noted in diagram):   RLE Inspected              Diagram Legend     Erythematous scaling macule/papule c/w actinic keratosis       Vascular papule c/w angioma      Pigmented verrucoid papule/plaque c/w seborrheic keratosis      Yellow umbilicated papule c/w sebaceous hyperplasia      Irregularly shaped tan macule c/w lentigo     1-2 mm smooth white papules consistent with Milia      Movable subcutaneous cyst with punctum c/w epidermal inclusion cyst      Subcutaneous movable cyst c/w pilar cyst      Firm pink to brown papule c/w dermatofibroma      Pedunculated fleshy papule(s) c/w skin tag(s)      Evenly pigmented macule c/w junctional nevus     Mildly variegated pigmented, slightly irregular-bordered macule c/w mildly atypical nevus      Flesh colored to evenly pigmented papule c/w intradermal nevus       Pink pearly papule/plaque c/w basal cell carcinoma      Erythematous hyperkeratotic cursted plaque c/w SCC      Surgical scar with no sign of skin cancer recurrence      Open and closed comedones      Inflammatory  · Stable without any wheezing  · Continue home inhalers  papules and pustules      Verrucoid papule consistent consistent with wart     Erythematous eczematous patches and plaques     Dystrophic onycholytic nail with subungual debris c/w onychomycosis     Umbilicated papule    Erythematous-base heme-crusted tan verrucoid plaque consistent with inflamed seborrheic keratosis     Erythematous Silvery Scaling Plaque c/w Psoriasis     See annotation      Assessment / Plan:        Impetigo  mupirocen bid x 1-2 weeks    SK (seborrheic keratosis)    These are benign inherited growths without a malignant potential. Reassurance given to patient. No treatment is necessary.            Follow-up in about 6 months (around 9/13/2019). UBSE

## 2020-07-08 NOTE — RESTORATIVE TECHNICIAN NOTE
Restorative Specialist Mobility Note       Activity: Other (Comment), Chair(SLideboard out of bed to chair)     Assistive Device: Other (Comment)(Slideboard)        Repositioned: Sitting, Up in chair, Other (Comment)(Chair Alarm)

## 2020-07-08 NOTE — ASSESSMENT & PLAN NOTE
Lab Results   Component Value Date    HGBA1C 8 8 (H) 06/25/2020       Recent Labs     07/07/20  2116 07/08/20  0814 07/08/20  1216 07/08/20  1635   POCGLU 168* 78 197* 148*       Blood Sugar Average: Last 72 hrs:  (P) 415 [id number]   · Uncontrolled due to IV steroid use  · now off steroids & low BS   · Diabetic diet  · Will stop the Humalog and decrease Lantus to 30 units HS to allow blood sugars to rise and then will adjust again  · Continue sliding scale coverage

## 2020-07-08 NOTE — PROGRESS NOTES
Progress Note - Delisa Day 1950, 81 y o  male MRN: [id number]    Unit/Bed#: [facility] 821-01 Encounter: [id number]    Primary Care Provider: Eugenio Baeza MD   Date and time admitted to hospital: 6/25/2020 11:43 AM        Sepsis due to COVID-19 pneumonia [facility])  Assessment & Plan  · Patient presenting with tachycardia, tachypnea, hypoxia and confirm COVID-19 pneumonia  · Now improved    CKD (chronic kidney disease)  Assessment & Plan  · Baseline creatinine around 1 2   · Management as above    Chronic obstructive pulmonary disease, unspecified (HCC)  Assessment & Plan  · Stable without any wheezing  · Continue home inhalers  Type 2 acute myocardial infarction [facility])  Assessment & Plan  · Likely due to acute respiratory failure from COVID-19 pneumonia  · Patient denies any chest pain or shortness of breath  · Will continue warfarin for anticoagulation given his lack of symptoms at this time  · Outpatient cardiac evaluation when stable    Chronic diastolic congestive heart failure [facility])  Assessment & Plan  Wt Readings from Last 3 Encounters:   07/08/20 (!) 142 kg (313 lb 0 9 oz)   06/15/20 (!) 155 kg (341 lb 4 4 oz)   06/08/20 (!) 154 kg (340 lb 9 8 oz)     · Volume status is adequate  · Monitor intake and output  · Monitor daily weights  · Continue torsemide    Chronic atrial fibrillation (HCC)  Assessment & Plan  · Rate controlled with metoprolol - decreased to 12 5 b i d  due to heart rate in 40s and 50s  · Warfarin for anticoagulation  · INR of > 6 compared to 2 7 a week ago  · Hold Coumadin  · Today 5    Essential hypertension  Assessment & Plan  · Well controlled    · Continue metoprolol   · Continue torsemide    Diabetes mellitus type 2, uncontrolled (HCC)  Assessment & Plan  Lab Results   Component Value Date    HGBA1C 8 8 (H) 06/25/2020       Recent Labs     07/07/20  2116 07/08/20  0814 07/08/20  1216 07/08/20  1635   POCGLU 168* 78 197* 148*       Blood Sugar Average: Last 72 hrs:  (P) 219 [id number]   · Uncontrolled due to IV steroid use  · now off steroids & low BS   · Diabetic diet  · Will stop the Humalog and decrease Lantus to 30 units HS to allow blood sugars to rise and then will adjust again  · Continue sliding scale coverage    * Acute respiratory failure with hypoxia due to Pneumonia due to COVID-19 virus  Assessment & Plan  · Pt presents with 1 week symptoms at [facility]  · Upon presentation fell in moderate severity for COVID  · stopped ceftriaxone and doxycycline as procalcitonin is negative   · Was treated with IV steroids for 10 days  · Continue vitamin-C, vitamin-D, zinc supplementation  · Continues to require 5-6 L oxygen, baseline not on any oxygen  · Curbside discussed with Infectious Disease, no indication for any other treatment regimen at this point of time at this level of oxygen  · Repeated chest x-ray today which appears to show worsening opacity on the right side more peripheral as well as worsening of his chronic left-sided opacity    Some of it could be just related to COVID  · Will attempt 1 dose of 40 IV Lasix in addition to his oral torsemide today  · Will get Pulmonary input into the etiology and reversibility of his hypoxia          [facility] Internal Medicine Progress Note  Patient: Nguyen Antonio 81 y o  male   MRN: [id number]  PCP: Kalia Lara MD  Unit/Bed#: [facility] 821-01 Encounter: [id number]  Date Of Visit: 07/08/20    Assessment:    Principal Problem:    Acute respiratory failure with hypoxia due to Pneumonia due to COVID-19 virus  Active Problems:    Diabetes mellitus type 2, uncontrolled ([facility] )    Essential hypertension    Chronic atrial fibrillation (HCC)    Chronic diastolic congestive heart failure (HCC)    [facility] )    Chronic obstructive pulmonary disease, unspecified ([facility] )    CKD (chronic kidney disease)    Sepsis due to COVID-19 pneumonia ([facility] )      Plan:           VTE Pharmacologic Prophylaxis: Pharmacologic: Warfarin (Coumadin)  Mechanical VTE Prophylaxis in Place: Yes    Patient Centered Rounds: I have performed bedside rounds with nursing staff today  Discussions with Specialists or Other Care Team Provider:     Education and Discussions with Family / Patient: patient    Time Spent for Care: 30 minutes  More than 50% of total time spent on counseling and coordination of care as described above  Current Length of Stay: 13 day(s)    Current Patient Status: Inpatient   Certification Statement: The patient will continue to require additional inpatient hospital stay due to hypoxia eval    Discharge Plan / Estimated Discharge Date:     Code Status: Level 1 - Full Code      Subjective:   Feels ok, denies CP or SOB    Objective:     Vitals:   Temp (24hrs), Av 2 °F (36 8 °C), Min:97 6 °F (36 4 °C), Max:99 °F (37 2 °C)    Temp:  [97 6 °F (36 4 °C)-99 °F (37 2 °C)] 97 6 °F (36 4 °C)  HR:  [43-85] 53  Resp:  [20-22] 20  BP: (120-127)/(58-80) 120/80  SpO2:  [92 %-97 %] 94 %  Body mass index is 39 13 kg/m²  Input and Output Summary (last 24 hours): Intake/Output Summary (Last 24 hours) at 2020 1914  Last data filed at 2020 1841  Gross per 24 hour   Intake 460 ml   Output 1250 ml   Net -790 ml       Physical Exam:     Physical Exam   Constitutional: He appears well-developed and well-nourished  HENT:   Head: Normocephalic and atraumatic  Eyes: Conjunctivae are normal    Cardiovascular: Normal rate and regular rhythm  Exam reveals no friction rub  No murmur heard  Pulmonary/Chest: Effort normal and breath sounds normal  No stridor  No respiratory distress  Abdominal: Soft  He exhibits no distension  There is no tenderness  Neurological: He is alert  Vitals reviewed        Additional Data:     Labs:    Results from last 7 days   Lab Units 20  0607 20  0557   WBC Thousand/uL 6 37 8 98   HEMOGLOBIN g/dL 13 0 13 2   HEMATOCRIT % 41 4 42 9   PLATELETS Thousands/uL 248 264   NEUTROS PCT %  --  91*   LYMPHS PCT %  --  5*   MONOS PCT %  --  2*   EOS PCT %  --  0     Results from last 7 days   Lab Units 07/07/20  0607  07/02/20  0607   POTASSIUM mmol/L 3 6   < > 3 8   CHLORIDE mmol/L 101   < > 106   CO2 mmol/L 33*   < > 33*   BUN mg/dL 66*   < > 75*   CREATININE mg/dL 1 19   < > 1 28   CALCIUM mg/dL 8 7   < > 9 1   ALK PHOS U/L  --   --  179*   ALT U/L  --   --  48   AST U/L  --   --  52*    < > = values in this interval not displayed  Results from last 7 days   Lab Units 07/08/20  0626   INR  5 25*       * I Have Reviewed All Lab Data Listed Above  * Additional Pertinent Lab Tests Reviewed:  All Labs Within Last 24 Hours Reviewed    Imaging:    Imaging Reports Reviewed Today Include:   Imaging Personally Reviewed by Myself Includes:      Recent Cultures (last 7 days):           Last 24 Hours Medication List:     Current Facility-Administered Medications:  acetaminophen 650 mg Oral Q6H PRN Ruddy Singh MD   albuterol 2 puff Inhalation Q6H PRN Ruddy Singh MD   atorvastatin 40 mg Oral After Chidi Orozco MD   bisacodyl 10 mg Rectal Daily PRN Ruddy Singh MD   cholecalciferol 2,000 Units Oral Daily Ruddy Singh MD   clotrimazole  Topical Q12H Nano Pitt MD   fluticasone-vilanterol 1 puff Inhalation Daily Ruddy Singh MD   furosemide 40 mg Intravenous Once Anita Tomlinson MD   insulin glargine 30 Units Subcutaneous HS Anita Tomlinson MD   insulin lispro 1-5 Units Subcutaneous HS Erickson Comer PA-C   insulin lispro 2-12 Units Subcutaneous TID AC Ruddy Singh MD   magnesium hydroxide 30 mL Oral Daily PRN Ruddy Singh MD   melatonin 6 mg Oral HS Tuesday M NICOLA Heath   metoprolol tartrate 12 5 mg Oral Q12H Anita Tomlinson MD   KHADRA ANTIFUNGAL 1 application Topical TID Jeane Mullen MD   multivitamin-minerals 1 tablet Oral Daily Ruddy Singh MD   torsemide 20 mg Oral BID (diuretic) Shay Richards MD        Today, Patient Was Seen By: Nelson Gamez MD    ** Please Note: This note has been constructed using a voice recognition system   **

## 2020-07-08 NOTE — ASSESSMENT & PLAN NOTE
· Pt presents with 1 week symptoms at [facility]  · Upon presentation fell in moderate severity for COVID  · stopped ceftriaxone and doxycycline as procalcitonin is negative   · Was treated with IV steroids for 10 days  · Continue vitamin-C, vitamin-D, zinc supplementation  · Continues to require 5-6 L oxygen, baseline not on any oxygen  · Madie discussed with Infectious Disease, no indication for any other treatment regimen at this point of time at this level of oxygen  · Repeated chest x-ray today which appears to show worsening opacity on the right side more peripheral as well as worsening of his chronic left-sided opacity    Some of it could be just related to COVID  · Will attempt 1 dose of 40 IV Lasix in addition to his oral torsemide today  · Will get Pulmonary input into the etiology and reversibility of his hypoxia

## 2020-07-08 NOTE — ASSESSMENT & PLAN NOTE
· Rate controlled with metoprolol - decreased to 12 5 b i d  due to heart rate in 40s and 50s  · Warfarin for anticoagulation  · INR of > 6 compared to 2 7 a week ago  · Hold Coumadin  · Today 5

## 2020-07-08 NOTE — ASSESSMENT & PLAN NOTE
Wt Readings from Last 3 Encounters:   07/08/20 (!) 142 kg (313 lb 0 9 oz)   06/15/20 (!) 155 kg (341 lb 4 4 oz)   06/08/20 (!) 154 kg (340 lb 9 8 oz)     · Volume status is adequate  · Monitor intake and output  · Monitor daily weights  · Continue torsemide

## 2020-07-09 ENCOUNTER — APPOINTMENT (INPATIENT)
Dept: RADIOLOGY | Facility: HOSPITAL | Age: 70
DRG: 871 | End: 2020-07-09
Payer: COMMERCIAL

## 2020-07-09 LAB
ANION GAP SERPL CALCULATED.3IONS-SCNC: 4 MMOL/L (ref 4–13)
BUN SERPL-MCNC: 47 MG/DL (ref 5–25)
CALCIUM SERPL-MCNC: 9 MG/DL (ref 8.3–10.1)
CHLORIDE SERPL-SCNC: 103 MMOL/L (ref 100–108)
CO2 SERPL-SCNC: 32 MMOL/L (ref 21–32)
CREAT SERPL-MCNC: 1.22 MG/DL (ref 0.6–1.3)
GFR SERPL CREATININE-BSD FRML MDRD: 61 ML/MIN/1.73SQ M
GLUCOSE SERPL-MCNC: 105 MG/DL (ref 65–140)
GLUCOSE SERPL-MCNC: 136 MG/DL (ref 65–140)
GLUCOSE SERPL-MCNC: 150 MG/DL (ref 65–140)
GLUCOSE SERPL-MCNC: 203 MG/DL (ref 65–140)
GLUCOSE SERPL-MCNC: 96 MG/DL (ref 65–140)
INR PPP: 4.21 (ref 0.84–1.19)
POTASSIUM SERPL-SCNC: 3.6 MMOL/L (ref 3.5–5.3)
PROTHROMBIN TIME: 40.2 SECONDS (ref 11.6–14.5)
SODIUM SERPL-SCNC: 139 MMOL/L (ref 136–145)

## 2020-07-09 PROCEDURE — 80048 BASIC METABOLIC PNL TOTAL CA: CPT | Performed by: HOSPITALIST

## 2020-07-09 PROCEDURE — 71250 CT THORAX DX C-: CPT

## 2020-07-09 PROCEDURE — 99223 1ST HOSP IP/OBS HIGH 75: CPT | Performed by: INTERNAL MEDICINE

## 2020-07-09 PROCEDURE — 99232 SBSQ HOSP IP/OBS MODERATE 35: CPT | Performed by: HOSPITALIST

## 2020-07-09 PROCEDURE — 85610 PROTHROMBIN TIME: CPT | Performed by: HOSPITALIST

## 2020-07-09 PROCEDURE — 82948 REAGENT STRIP/BLOOD GLUCOSE: CPT

## 2020-07-09 RX ADMIN — METOPROLOL TARTRATE 12.5 MG: 25 TABLET ORAL at 21:37

## 2020-07-09 RX ADMIN — Medication 1 TABLET: at 08:47

## 2020-07-09 RX ADMIN — CLOTRIMAZOLE: 10 CREAM TOPICAL at 08:47

## 2020-07-09 RX ADMIN — INSULIN GLARGINE 30 UNITS: 100 INJECTION, SOLUTION SUBCUTANEOUS at 21:25

## 2020-07-09 RX ADMIN — ATORVASTATIN CALCIUM 40 MG: 40 TABLET, FILM COATED ORAL at 17:35

## 2020-07-09 RX ADMIN — FLUTICASONE FUROATE AND VILANTEROL TRIFENATATE 1 PUFF: 200; 25 POWDER RESPIRATORY (INHALATION) at 08:47

## 2020-07-09 RX ADMIN — MICONAZOLE NITRATE 1 APPLICATION: 20 CREAM TOPICAL at 21:37

## 2020-07-09 RX ADMIN — INSULIN LISPRO 2 UNITS: 100 INJECTION, SOLUTION INTRAVENOUS; SUBCUTANEOUS at 12:18

## 2020-07-09 RX ADMIN — CLOTRIMAZOLE 1 APPLICATION: 10 CREAM TOPICAL at 21:25

## 2020-07-09 RX ADMIN — TORSEMIDE 20 MG: 20 TABLET ORAL at 08:47

## 2020-07-09 RX ADMIN — TORSEMIDE 20 MG: 20 TABLET ORAL at 17:35

## 2020-07-09 RX ADMIN — INSULIN LISPRO 1 UNITS: 100 INJECTION, SOLUTION INTRAVENOUS; SUBCUTANEOUS at 21:27

## 2020-07-09 RX ADMIN — MELATONIN 6 MG: at 21:27

## 2020-07-09 RX ADMIN — MELATONIN 2000 UNITS: at 08:47

## 2020-07-09 RX ADMIN — METOPROLOL TARTRATE 12.5 MG: 25 TABLET ORAL at 08:47

## 2020-07-09 RX ADMIN — MICONAZOLE NITRATE 1 APPLICATION: 20 CREAM TOPICAL at 08:48

## 2020-07-09 NOTE — RESTORATIVE TECHNICIAN NOTE
Restorative Specialist Mobility Note       Time: 10:50am    Present to perform Restorative treatment with patient out of bed to chair via slide board  Patient is refusing at this time  RN aware  Importance of mobility reviewed with patient  Janes Conte Mobility Coordinator [place], CHARLESOF, ASOP R  O T, O B T

## 2020-07-09 NOTE — ASSESSMENT & PLAN NOTE
Wt Readings from Last 3 Encounters:   07/09/20 (!) 141 kg (310 lb 4 8 oz)   06/15/20 (!) 155 kg (341 lb 4 4 oz)   06/08/20 (!) 154 kg (340 lb 9 8 oz)     · Volume status is adequate  · Monitor intake and output  · Monitor daily weights  · Continue torsemide

## 2020-07-09 NOTE — SOCIAL WORK
Call to Ketan Yeboah  [phone] explained IMM  Explained patient possibly medically cleared for d/c 7/10/2020  Told him both [address] and [facility] looking at accepting patient  Kulwant Walsh prefers [facility]  Called Alfonso [facility] [phone] to see if SQ injection could be done at another infusion center  Cm spoke to staff Clinical Coordinator Sunny Saravia stated injections could be done at another site

## 2020-07-09 NOTE — ASSESSMENT & PLAN NOTE
· Rate controlled with metoprolol - decreased to 12 5 b i d  due to heart rate in 40s and 50s  · Warfarin for anticoagulation  · INR of > 3, cont off coumadin

## 2020-07-09 NOTE — PROGRESS NOTES
Progress Note - Loan Brand 1950, 81 y o  male MRN: [id number]    Unit/Bed#: [facility] 821-01 Encounter: [id number]    Primary Care Provider: Quincy Habermann, MD   Date and time admitted to hospital: 6/25/2020 11:43 AM        Sepsis due to COVID-19 pneumonia [facility])  Assessment & Plan  · Patient presenting with tachycardia, tachypnea, hypoxia and confirm COVID-19 pneumonia  · Now improved    CKD (chronic kidney disease)  Assessment & Plan  · Baseline creatinine around 1 2   · Management as above    Chronic obstructive pulmonary disease, unspecified (HCC)  Assessment & Plan  · Stable without any wheezing  · Continue home inhalers  Type 2 acute myocardial infarction [facility])  Assessment & Plan  · Likely due to acute respiratory failure from COVID-19 pneumonia  · Patient denies any chest pain or shortness of breath  · Will continue warfarin for anticoagulation given his lack of symptoms at this time  · Outpatient cardiac evaluation when stable    Chronic diastolic congestive heart failure [facility])  Assessment & Plan  Wt Readings from Last 3 Encounters:   07/09/20 (!) 141 kg (310 lb 4 8 oz)   06/15/20 (!) 155 kg (341 lb 4 4 oz)   06/08/20 (!) 154 kg (340 lb 9 8 oz)     · Volume status is adequate  · Monitor intake and output  · Monitor daily weights  · Continue torsemide    Chronic atrial fibrillation (HCC)  Assessment & Plan  · Rate controlled with metoprolol - decreased to 12 5 b i d  due to heart rate in 40s and 50s  · Warfarin for anticoagulation  · INR of > 3, cont off coumadin    Essential hypertension  Assessment & Plan  · Well controlled    · Continue metoprolol   · Continue torsemide    Diabetes mellitus type 2, uncontrolled (HCC)  Assessment & Plan  Lab Results   Component Value Date    HGBA1C 8 8 (H) 06/25/2020       Recent Labs     07/08/20  2022 07/09/20  0747 07/09/20  1207 07/09/20  1637   POCGLU 191* 105 150* 136       Blood Sugar Average: Last 72 hrs:  (P) 937 [id number]   · Uncontrolled due to IV steroid use  · now off steroids & low BS   · Diabetic diet  · Will stop the Humalog and decrease Lantus to 30 units HS  · Continue sliding scale coverage    * Acute respiratory failure with hypoxia due to Pneumonia due to COVID-19 virus  Assessment & Plan  · Pt presents with 1 week symptoms at [facility]  · Upon presentation fell in moderate severity for COVID  · S/p 8-9 days of ceftriaxone and doxycycline  · Was treated with IV steroids for 10 days  · Continue vitamin-C, vitamin-D, zinc supplementation  · Continues to require 5-6 L oxygen, baseline not on any oxygen  · Curbside discussed with Infectious Disease, no indication for any other treatment regimen at this point of time at this level of oxygen  · Repeated chest x-ray which appears to show worsening opacity on the right side more peripheral as well as worsening of his chronic left-sided opacity    Some of it could be just related to COVID  · s/p 1 dose of 40 IV Lasix yesterday in addition to his oral torsemide today  · Will get Pulmonary input into the etiology and reversibility of his hypoxia  · o2 need better to 3-4 L today  · If nothing more to add by pulm & o2 better then clear for DC tomorrow          [facility] Internal Medicine Progress Note  Patient: Andrew Benton 81 y o  male   MRN: [id number]  PCP: Kalia Lara MD  [facility] Encounter: [id number]  Date Of Visit: 07/09/20    Assessment:    Principal Problem:    Acute respiratory failure with hypoxia due to Pneumonia due to COVID-19 virus  Active Problems:    Diabetes mellitus type 2, uncontrolled ([facility] )    Essential hypertension    Chronic atrial fibrillation (HCC)    Chronic diastolic congestive heart failure (HCC)    Type 2 acute myocardial infarction ([facility] )    Chronic obstructive pulmonary disease, unspecified (HCC)    CKD (chronic kidney disease)    Sepsis due to COVID-19 pneumonia ([facility] )      Plan:         VTE Pharmacologic Prophylaxis:   Pharmacologic: Warfarin (Coumadin)  Mechanical VTE Prophylaxis in Place: Yes    Patient Centered Rounds: I have performed bedside rounds with nursing staff today  Discussions with Specialists or Other Care Team Provider:     Education and Discussions with Family / Patient: dajuan veronica left VM for son    Time Spent for Care: 30 minutes  More than 50% of total time spent on counseling and coordination of care as described above  Current Length of Stay: 14 day(s)    Current Patient Status: Inpatient   Certification Statement: The patient will continue to require additional inpatient hospital stay due to pulm input    Discharge Plan / Estimated Discharge Date: if cleared by pulm then tomorrow    Code Status: Level 1 - Full Code      Subjective:   Feels ok, denies CP or SOB, wants to know when he will get out of hospital    Objective:     Vitals:   Temp (24hrs), Av 1 °F (36 7 °C), Min:97 6 °F (36 4 °C), Max:98 6 °F (37 °C)    Temp:  [97 6 °F (36 4 °C)-98 6 °F (37 °C)] 98 3 °F (36 8 °C)  HR:  [43-85] 67  Resp:  [18-20] 20  BP: (117-135)/(72-98) 117/72  SpO2:  [90 %-94 %] 90 %  Body mass index is 38 78 kg/m²  Input and Output Summary (last 24 hours): Intake/Output Summary (Last 24 hours) at 2020 1700  Last data filed at 2020 1400  Gross per 24 hour   Intake 1600 ml   Output 1725 ml   Net -125 ml       Physical Exam:     Physical Exam   Constitutional: He appears well-developed and well-nourished  HENT:   Head: Normocephalic and atraumatic  Eyes: Conjunctivae are normal    Cardiovascular: Normal rate and regular rhythm  No murmur heard  Pulmonary/Chest: Effort normal  No stridor  No respiratory distress  He has no wheezes  Abdominal: Soft  He exhibits no distension  There is no tenderness  Neurological: He is alert  Vitals reviewed        Additional Data:     Labs:    Results from last 7 days   Lab Units 20  0607 20  0557   WBC Thousand/uL 6 37 8 98   HEMOGLOBIN g/dL 13 0 13 2   HEMATOCRIT % 41 4 42 9   PLATELETS Thousands/uL 248 264   NEUTROS PCT %  --  91*   LYMPHS PCT %  --  5*   MONOS PCT %  --  2*   EOS PCT %  --  0     Results from last 7 days   Lab Units 07/09/20  0531   POTASSIUM mmol/L 3 6   CHLORIDE mmol/L 103   CO2 mmol/L 32   BUN mg/dL 47*   CREATININE mg/dL 1 22   CALCIUM mg/dL 9 0     Results from last 7 days   Lab Units 07/09/20  0531   INR  4 21*       * I Have Reviewed All Lab Data Listed Above  * Additional Pertinent Lab Tests Reviewed: All Labs Within Last 24 Hours Reviewed    Imaging:    Imaging Reports Reviewed Today Include:   Imaging Personally Reviewed by Myself Includes:      Recent Cultures (last 7 days):           Last 24 Hours Medication List:     Current Facility-Administered Medications:  acetaminophen 650 mg Oral Q6H PRN Brenda Heredia MD   albuterol 2 puff Inhalation Q6H PRN Brenda Heredia MD   atorvastatin 40 mg Oral After Dawna Coronel MD   bisacodyl 10 mg Rectal Daily PRN Brenad Heredia MD   cholecalciferol 2,000 Units Oral Daily Brenda Heredia MD   clotrimazole  Topical Q12H Rob Figueroa MD   fluticasone-vilanterol 1 puff Inhalation Daily Brenda Heredia MD   insulin glargine 30 Units Subcutaneous HS Jimmie García MD   insulin lispro 1-5 Units Subcutaneous HS Chilo Andrade PA-C   insulin lispro 2-12 Units Subcutaneous TID AC Brenda Heredia MD   magnesium hydroxide 30 mL Oral Daily PRN Brenda Heredia MD   melatonin 6 mg Oral HS Tuesday M [place]   metoprolol tartrate 12 5 mg Oral Q12H Jimmie García MD   KHADRA ANTIFUNGAL 1 application Topical TID Judge Primo MD   multivitamin-minerals 1 tablet Oral Daily Brenda Heredia MD   torsemide 20 mg Oral BID (diuretic) Judge Primo MD        Today, Patient Was Seen By: Jimmie García MD    ** Please Note: This note has been constructed using a voice recognition system   **

## 2020-07-09 NOTE — CONSULTS
Consult Note - Pulmonary   Loan Brand 81 y o  male MRN: [id number]  Unit/Bed#: [facility] 821-01 Encounter: [id number]      Reason for consultation: Hypoxia    Requesting physician: Dr Lillian Hendricks and Plan     Loan Brand is a 81 y o  male w/ PMHx of CHF (Grade 3 Diastolic) (COPD severe), Multiple Myeloma on chemo, CKD, HTN and morbid obesity  He presented to the ED with SOB and worsening cough, and diarrhea  Acute Respiratory Failure w/ Hypoxia due to Covid 19 Pneumonia     6/25-Presented to the ED w/ 1 week of SOB and worsening cough  -SIRS positive on Admission- Tachypnic and Tachycardic   -Cxray- w/ B/L consolidation concerning for pneumonia   -Positive For Covid 19  -Elevated Inflammatory markers- CRP, Procal (2 7), Ferritin and IL-6  -Cxray- w/ B/L consolidation concerning for pneumonia    -Started on Moderate Treatment Algorithm for Covid  7/3 Procalc  09 WNL   - Completed 11 days of Rocephin and Doxycycline  - Completed 10 days of IV steroids  - Continue Vitamin C, D and Zinc  - Continue to Require 5-6L of Oxygen   7/8 Cxray- Patchy consolidation throughout the right lung which could be due to pneumonia     7/9 afebrile, no complains of wheezing or worsening of SOB  -Saturations in 90s on 4L NC, Titration goal of 88-92%  -Continue MDI Bronchodilators, cannot use Nebs in the setting of COVID-19 pneumonia    Chronic Obstructive Pulmonary Disease    -Severe COPD as per PFTs   -Stable without any signs of exacerbation  -continue home inhalers  -Saturation goal of 88-92%  -Activity as tolerated     Chronic Diastolic CHF    Wt Readings from Last 3 Encounters:   07/08/20 (!) 142 kg (313 lb 0 9 oz)   06/15/20 (!) 155 kg (341 lb 4 4 oz)   06/08/20 (!) 154 kg (340 lb 9 8 oz)      · Volume status is adequate  · Monitor intake and output  · Monitor daily weights  · Continue torsemide    Chronic Afib    · Rate controlled with metoprolol - decreased to 12 5 b i d  due to heart rate in 40s and 50s  · Warfarin for anticoagulation  · INR of > 6 compared to 2 7 a week ago  · Hold Coumadin        7/9 INR of 4 21    History of Present Illness   HPI:  Naima Salcido is a 81 y o  male w/ PMHx of CHF (Grade 3 Diastolic) (COPD severe), Multiple Myeloma on chemo, CKD, HTN and morbid obesity  He presented to the ED with SOB and worsening cough, and diarrhea  He is not sure of having any fevers  No smoking history  He is admitted for Acute Respiratory Failure in the setting of Covid 19 Pneumonia  Review of Systems   Constitutional: Negative for fever  Respiratory: Negative for chest tightness, shortness of breath and wheezing  Cardiovascular: Negative  Negative for chest pain, palpitations and leg swelling  Historical Information   Past Medical History:   Diagnosis Date    Cancer [facility])     CHF (congestive heart failure) (HCC)     Chronic kidney disease     COPD (chronic obstructive pulmonary disease) (HCC)     Decubitus ulcer of heel     LAST ASSESSED [id number]    Diabetes mellitus (HCC)     History of varicose veins     Hypertension     Intermittent claudication (HCC)     Neuropathy     Seasonal allergies      Past Surgical History:   Procedure Laterality Date    AMPUTATION ABOVE KNEE (AKA) Left 7/31/2019    Procedure: AMPUTATION ABOVE KNEE (AKA);   Surgeon: Mer Yates MD;  Location: [facility];  Service: General    ANGIOPLASTY      W/ FLUOROSC ANGIOGRAPGY PERIPHERAL ARTERY ADDITIONAL  LAST ASSESSED [id number]    ANGIOPLASTY / STENTING FEMORAL      TANSCATH INTRAVASCULAR STENT PLACEMENT PERCUTANEOUS FEMORAL     COLONOSCOPY  2010    CT BONE MARROW BIOPSY AND ASPIRATION  8/9/2019    FULL THICKNESS SKIN GRAFT      TENDON REPAIR      TOE AMPUTATION Left 12/27/2018    Procedure: AMPUTATION left 4th TOE;  Surgeon: Elzbieta Pratt DPM;  Location: [facility];  Service: Podiatry     Family History   Problem Relation Age of Onset    Other Mother         CARDIAC DISORDER     Diabetes Mother     Cancer Father     Other Family         CARDIAC DISORDER     Diabetes Family     Cancer Family     Mental illness Family     Kidney disease Sister     Diabetes Sister        Occupational History:     Social History:     Meds/Allergies   Current Facility-Administered Medications   Medication Dose Route Frequency    acetaminophen (TYLENOL) tablet 650 mg  650 mg Oral Q6H PRN    albuterol (PROVENTIL HFA,VENTOLIN HFA) inhaler 2 puff  2 puff Inhalation Q6H PRN    atorvastatin (LIPITOR) tablet 40 mg  40 mg Oral After Dinner    bisacodyl (DULCOLAX) rectal suppository 10 mg  10 mg Rectal Daily PRN    cholecalciferol (VITAMIN D3) tablet 2,000 Units  2,000 Units Oral Daily    clotrimazole (LOTRIMIN) 1 % cream   Topical Q12H [address]    fluticasone-vilanterol (BREO ELLIPTA) 200-25 MCG/INH inhaler 1 puff  1 puff Inhalation Daily    insulin glargine (LANTUS) subcutaneous injection 30 Units 0 3 mL  30 Units Subcutaneous HS    insulin lispro (HumaLOG) 100 units/mL subcutaneous injection 1-5 Units  1-5 Units Subcutaneous HS    insulin lispro (HumaLOG) 100 units/mL subcutaneous injection 2-12 Units  2-12 Units Subcutaneous TID AC    magnesium hydroxide (MILK OF MAGNESIA) 400 mg/5 mL oral suspension 30 mL  30 mL Oral Daily PRN    melatonin tablet 6 mg  6 mg Oral HS    metoprolol tartrate (LOPRESSOR) partial tablet 12 5 mg  12 5 mg Oral Q12H    moisture barrier miconazole 2% cream (aka KHADRA MOISTURE BARRIER ANTIFUNGAL CREAM)  1 application Topical TID    multivitamin-minerals (CENTRUM ADULTS) tablet 1 tablet  1 tablet Oral Daily    torsemide (DEMADEX) tablet 20 mg  20 mg Oral BID (diuretic)     Medications Prior to Admission   Medication    acetaminophen (TYLENOL) 500 mg tablet    acyclovir (ZOVIRAX) 400 MG tablet    acyclovir (ZOVIRAX) 400 MG tablet    albuterol (PROVENTIL HFA,VENTOLIN HFA) 90 mcg/act inhaler    Alcohol Swabs (ALCOHOL PREP) 70 % PADS    allopurinol (ZYLOPRIM) 300 mg tablet  ammonium lactate (LAC-HYDRIN) 12 % lotion    atorvastatin (LIPITOR) 20 mg tablet    BD AUTOSHIELD DUO 30G X 5 MM MISC    BD INSULIN SYRINGE U/F 31G X 5/16" 0 5 ML MISC    BD PEN NEEDLE HILARIO U/F 32G X 4 MM MISC    bisacodyl (bisacodyl) 5 mg EC tablet    bisacodyl (DULCOLAX) 10 mg suppository    bortezomib (VELCADE)    clotrimazole (LOTRIMIN) 1 % cream    Easy Touch Safety Lancets 28G MISC    fluticasone-salmeterol (ADVAIR DISKUS, WIXELA INHUB) 250-50 mcg/dose inhaler    insulin glargine (LANTUS SOLOSTAR) 100 units/mL injection pen    insulin lispro (HumaLOG) 100 units/mL injection    Insulin Pen Needle 31G X 5 MM MISC    magnesium hydroxide (MILK OF MAGNESIA) 400 mg/5 mL oral suspension    Melatonin 5 MG TABS    metFORMIN (GLUCOPHAGE) 1000 MG tablet    metoprolol tartrate (LOPRESSOR) 25 mg tablet    NOVOLOG FLEXPEN 100 units/mL SOPN    torsemide (DEMADEX) 10 mg tablet    triamcinolone (KENALOG) 0 1 % cream    warfarin (COUMADIN) 7 5 mg tablet     Allergies   Allergen Reactions    Latex Rash       Vitals:    07/08/20 2227 07/09/20 0010 07/09/20 0531 07/09/20 0750   BP:    135/98   Pulse: 85 (!) 43  74   Resp:    18   Temp:  98 6 °F (37 °C)  98 1 °F (36 7 °C)   TempSrc:       SpO2:  94%  92%   Weight:   (!) 141 kg (310 lb 4 8 oz)    Height:           Physical Exam   Constitutional: No distress  Cardiovascular: Normal rate and regular rhythm  Difficult to appreciate 2/2 body habitus   Pulmonary/Chest: No respiratory distress  He has no wheezes  He exhibits no tenderness  Difficult to appreciate due 2 body habitus   Skin: He is not diaphoretic         Labs:   ABG: No results found for: PHART, NEX1JBC, PO2ART, BWR5FIF, D3QZRYQO, BEART, SOURCE, BNP: No results found for: BNP, CBC: No results found for: WBC, HGB, HCT, MCV, PLT, ADJUSTEDWBC, MCH, MCHC, RDW, MPV, NRBC, CMP:   Lab Results   Component Value Date    SODIUM 139 07/09/2020    K 3 6 07/09/2020     07/09/2020    CO2 32 07/09/2020 BUN 47 (H) 07/09/2020    CREATININE 1 22 07/09/2020    CALCIUM 9 0 07/09/2020    EGFR 61 07/09/2020   , PT/INR:   Lab Results   Component Value Date    INR 4 21 (H) 07/09/2020   , Troponin: No results found for: TROPONINI  Results from last 7 days   Lab Units 07/07/20  0607 07/06/20  0557 07/04/20  0513   WBC Thousand/uL 6 37 8 98 5 68   HEMOGLOBIN g/dL 13 0 13 2 11 8*   HEMATOCRIT % 41 4 42 9 38 8   PLATELETS Thousands/uL 248 264 247   NEUTROS PCT %  --  91*  --    MONOS PCT %  --  2*  --       Results from last 7 days   Lab Units 07/09/20  0531 07/07/20  0607 07/06/20  0557   POTASSIUM mmol/L 3 6 3 6 4 0   CHLORIDE mmol/L 103 101 105   CO2 mmol/L 32 33* 32   BUN mg/dL 47* 66* 62*   CREATININE mg/dL 1 22 1 19 1 19   CALCIUM mg/dL 9 0 8 7 9 6              Results from last 7 days   Lab Units 07/09/20  0531 07/08/20  0626 07/07/20  0607   INR  4 21* 5 25* 6 32*         0   Lab Value Date/Time    TROPONINI 5 19 (H) 06/27/2020 0804    TROPONINI 5 85 (H) 06/27/2020 0505    TROPONINI 6 35 (H) 06/27/2020 0218    TROPONINI 5 77 (H) 06/26/2020 1942    TROPONINI 5 69 (H) 06/26/2020 1454    TROPONINI 0 99 (H) 06/25/2020 1207    TROPONINI 0 16 (H) 10/30/2019 0041    TROPONINI 0 16 (H) 10/29/2019 2135    TROPONINI 0 15 (H) 10/29/2019 1424    TROPONINI <0 02 03/23/2018 0236    TROPONINI <0 02 10/08/2017 1111       Imaging and other studies: I have personally reviewed pertinent reports  Pulmonary function testing:     EKG, Pathology, and Other Studies: I have personally reviewed pertinent reports          Code Status: Level 1 - Full Code

## 2020-07-09 NOTE — ASSESSMENT & PLAN NOTE
Lab Results   Component Value Date    HGBA1C 8 8 (H) 06/25/2020       Recent Labs     07/08/20 2022 07/09/20  0747 07/09/20  1207 07/09/20  1637   POCGLU 191* 105 150* 136       Blood Sugar Average: Last 72 hrs:  (P) 633 [id number]   · Uncontrolled due to IV steroid use  · now off steroids & low BS   · Diabetic diet  · Will stop the Humalog and decrease Lantus to 30 units HS  · Continue sliding scale coverage

## 2020-07-09 NOTE — PLAN OF CARE
Problem: PAIN - ADULT  Goal: Verbalizes/displays adequate comfort level or baseline comfort level  Description  Interventions:  - Encourage patient to monitor pain and request assistance  - Assess pain using appropriate pain scale  - Administer analgesics based on type and severity of pain and evaluate response  - Implement non-pharmacological measures as appropriate and evaluate response  - Consider cultural and social influences on pain and pain management  - Notify physician/advanced practitioner if interventions unsuccessful or patient reports new pain  Outcome: Progressing     Problem: INFECTION - ADULT  Goal: Absence or prevention of progression during hospitalization  Description  INTERVENTIONS:  - Assess and monitor for signs and symptoms of infection  - Monitor lab/diagnostic results  - Monitor all insertion sites, i e  indwelling lines, tubes, and drains  - Monitor endotracheal if appropriate and nasal secretions for changes in amount and color  - [place] appropriate cooling/warming therapies per order  - Administer medications as ordered  - Instruct and encourage patient and family to use good hand hygiene technique  - Identify and instruct in appropriate isolation precautions for identified infection/condition  Outcome: Progressing  Goal: Absence of fever/infection during neutropenic period  Description  INTERVENTIONS:  - Monitor WBC    Outcome: Progressing     Problem: SAFETY ADULT  Goal: Patient will remain free of falls  Description  INTERVENTIONS:  - Assess patient frequently for physical needs  -  Identify cognitive and physical deficits and behaviors that affect risk of falls    -  [place] fall precautions as indicated by assessment   - Educate patient/family on patient safety including physical limitations  - Instruct patient to call for assistance with activity based on assessment  - Modify environment to reduce risk of injury  - Consider OT/PT consult to assist with strengthening/mobility  Outcome: Progressing  Goal: Maintain or return to baseline ADL function  Description  INTERVENTIONS:  -  Assess patient's ability to carry out ADLs; assess patient's baseline for ADL function and identify physical deficits which impact ability to perform ADLs (bathing, care of mouth/teeth, toileting, grooming, dressing, etc )  - Assess/evaluate cause of self-care deficits   - Assess range of motion  - Assess patient's mobility; develop plan if impaired  - Assess patient's need for assistive devices and provide as appropriate  - Encourage maximum independence but intervene and supervise when necessary  - Involve family in performance of ADLs  - Assess for home care needs following discharge   - Consider OT consult to assist with ADL evaluation and planning for discharge  - Provide patient education as appropriate  Outcome: Progressing  Goal: Maintain or return mobility status to optimal level  Description  INTERVENTIONS:  - Assess patient's baseline mobility status (ambulation, transfers, stairs, etc )    - Identify cognitive and physical deficits and behaviors that affect mobility  - Identify mobility aids required to assist with transfers and/or ambulation (gait belt, sit-to-stand, lift, walker, cane, etc )  - [place] fall precautions as indicated by assessment  - Record patient progress and toleration of activity level on Mobility SBAR; progress patient to next Phase/Stage  - Instruct patient to call for assistance with activity based on assessment  - Consider rehabilitation consult to assist with strengthening/weightbearing, etc   Outcome: Progressing     Problem: Prexisting or High Potential for Compromised Skin Integrity  Goal: Skin integrity is maintained or improved  Description  INTERVENTIONS:  - Identify patients at risk for skin breakdown  - Assess and monitor skin integrity  - Assess and monitor nutrition and hydration status  - Monitor labs   - Assess for incontinence   - Turn and reposition patient  - Assist with mobility/ambulation  - Relieve pressure over bony prominences  - Avoid friction and shearing  - Provide appropriate hygiene as needed including keeping skin clean and dry  - Evaluate need for skin moisturizer/barrier cream  - Collaborate with interdisciplinary team   - Patient/family teaching  - Consider wound care consult   Outcome: Progressing     Problem: Potential for Falls  Goal: Patient will remain free of falls  Description  INTERVENTIONS:  - Assess patient frequently for physical needs  -  Identify cognitive and physical deficits and behaviors that affect risk of falls  -  [place] fall precautions as indicated by assessment   - Educate patient/family on patient safety including physical limitations  - Instruct patient to call for assistance with activity based on assessment  - Modify environment to reduce risk of injury  - Consider OT/PT consult to assist with strengthening/mobility  Outcome: Progressing     Problem: Nutrition/Hydration-ADULT  Goal: Nutrient/Hydration intake appropriate for improving, restoring or maintaining nutritional needs  Description  Monitor and assess patient's nutrition/hydration status for malnutrition  Collaborate with interdisciplinary team and initiate plan and interventions as ordered  Monitor patient's weight and dietary intake as ordered or per policy  Utilize nutrition screening tool and intervene as necessary  Determine patient's food preferences and provide high-protein, high-caloric foods as appropriate       INTERVENTIONS:  - Monitor oral intake, urinary output, labs, and treatment plans  - Assess nutrition and hydration status and recommend course of action  - Evaluate amount of meals eaten  - Assist patient with eating if necessary   - Allow adequate time for meals  - Recommend/ encourage appropriate diets, oral nutritional supplements, and vitamin/mineral supplements  - Order, calculate, and assess calorie counts as needed  - Recommend, monitor, and adjust tube feedings and TPN/PPN based on assessed needs  - Assess need for intravenous fluids  - Provide specific nutrition/hydration education as appropriate  - Include patient/family/caregiver in decisions related to nutrition  Outcome: Progressing

## 2020-07-09 NOTE — ASSESSMENT & PLAN NOTE
· Pt presents with 1 week symptoms at [facility]  · Upon presentation fell in moderate severity for COVID  · S/p 8-9 days of ceftriaxone and doxycycline  · Was treated with IV steroids for 10 days  · Continue vitamin-C, vitamin-D, zinc supplementation  · Continues to require 5-6 L oxygen, baseline not on any oxygen  · Chantalide discussed with Infectious Disease, no indication for any other treatment regimen at this point of time at this level of oxygen  · Repeated chest x-ray which appears to show worsening opacity on the right side more peripheral as well as worsening of his chronic left-sided opacity    Some of it could be just related to COVID  · s/p 1 dose of 40 IV Lasix yesterday in addition to his oral torsemide today  · Will get Pulmonary input into the etiology and reversibility of his hypoxia  · o2 need better to 3-4 L today  · If nothing more to add by pulm & o2 better then clear for DC tomorrow

## 2020-07-09 NOTE — WOUND OSTOMY CARE
Progress Note - Wound   Charly Bal 81 y o  male MRN: [id number]  Unit/Bed#: [facility] 821-01 Encounter: [id number]        Assessment:   Patient seen for continued skin and wound care  He is awake, alert in bed with no complaints, primary RN in room assessing skin with wound care  Patient agreeable for assessment and photography or wounds  Findings  1-R buttock with small stage 2 pressure injury  Wound bed is 100% pink with no drainage  2-R shin with very small venous stasis wound, currently being treated with xeroform which is causing maceration to hyperkeratotic skin  Patient wound benefit greater from topical cream treatment vs continue Dermagran  Primary RN made aware  3-L posterior linear DTI now healed  4-L anterior thigh stage 2 fully resurfaced with hypopigmented scar tissue  Continue with following wound/Skin care plans:  1-Hydraguard to bilateral LE and R heels BID and PRN  2-Elevate heels to offload pressure  3-Ehob cushion in chair when out of bed  4-Moisturize skin daily with skin nourishing cream   5-Turn/reposition q2h or when medically stable for pressure re-distribution on skin  6-Travis cream to sacrum, buttock and abdominal fold and groin TID and PRN  Vitals: Blood pressure 131/76, pulse 70, temperature 98 °F (36 7 °C), resp  rate 20, height 6' 3" (1 905 m), weight (!) 141 kg (310 lb 4 8 oz), SpO2 92 %  ,Body mass index is 38 78 kg/m²          Wound 06/26/20 Pressure Injury Buttocks Right (Active)   Wound Image   7/9/2020 10:23 AM   Wound Description Pink 7/9/2020  9:00 AM   Staging Stage II 7/6/2020  8:00 AM   Yi-wound Assessment Pink 7/9/2020  9:00 AM   Wound Length (cm) 1 cm 7/3/2020  8:51 AM   Wound Width (cm) 1 cm 7/3/2020  8:51 AM   Wound Depth (cm) 0 1 6/26/2020  9:29 AM   Calculated Wound Area (cm^2) 1 cm^2 7/3/2020  8:51 AM   Calculated Wound Volume (cm^3) 0 8 cm^3 6/26/2020  9:29 AM   Closure Open to air 7/6/2020  8:00 AM   Drainage Amount None 7/6/2020 8:00 AM   Treatments Site care 7/6/2020  8:00 AM   Dressing Moisture barrier;Open to air 7/9/2020  9:00 AM   Patient Tolerance Tolerated well 7/3/2020 11:00 PM   Dressing Status Clean;Dry; Intact 7/7/2020  8:15 PM       Wound 06/30/20 Venous stasis ulcer Leg Right; Anterior (Active)   Wound Image    7/9/2020 10:26 AM   Wound Description Tula;Drainage 7/9/2020  9:00 AM   Yi-wound Assessment Pink;Fragile 7/9/2020  9:00 AM   Wound Length (cm) 0 4 cm 7/9/2020 10:30 AM   Wound Width (cm) 0 2 cm 7/9/2020 10:30 AM   Wound Depth (cm) 0 01 7/9/2020 10:30 AM   Calculated Wound Area (cm^2) 0 08 cm^2 7/9/2020 10:30 AM   Calculated Wound Volume (cm^3) 0 cm^3 7/9/2020 10:30 AM   Closure Open to air 7/2/2020  8:30 AM   Drainage Amount Small 7/5/2020  9:00 AM   Drainage Description Serosanguineous 7/7/2020  2:00 AM   Treatments Irrigation with NSS 7/5/2020  5:00 AM   Dressing Moisture barrier 7/9/2020  9:00 AM   Dressing Changed Changed 7/8/2020  8:30 PM   Patient Tolerance Tolerated well 7/8/2020  8:30 PM   Dressing Status Clean;Dry; Intact 7/8/2020  8:30 PM         Our skin care recommendations remains as nursing orders, wound care to continue following, please call ext [phone] with questions or concerns        Ann Bullock, RN,BSN, [facility]

## 2020-07-10 DIAGNOSIS — C90.00 MULTIPLE MYELOMA NOT HAVING ACHIEVED REMISSION (HCC): ICD-10-CM

## 2020-07-10 LAB
ANION GAP SERPL CALCULATED.3IONS-SCNC: 5 MMOL/L (ref 4–13)
BUN SERPL-MCNC: 40 MG/DL (ref 5–25)
CALCIUM SERPL-MCNC: 8.8 MG/DL (ref 8.3–10.1)
CHLORIDE SERPL-SCNC: 105 MMOL/L (ref 100–108)
CO2 SERPL-SCNC: 31 MMOL/L (ref 21–32)
CREAT SERPL-MCNC: 1.26 MG/DL (ref 0.6–1.3)
GFR SERPL CREATININE-BSD FRML MDRD: 59 ML/MIN/1.73SQ M
GLUCOSE SERPL-MCNC: 116 MG/DL (ref 65–140)
GLUCOSE SERPL-MCNC: 143 MG/DL (ref 65–140)
GLUCOSE SERPL-MCNC: 145 MG/DL (ref 65–140)
GLUCOSE SERPL-MCNC: 160 MG/DL (ref 65–140)
GLUCOSE SERPL-MCNC: 96 MG/DL (ref 65–140)
INR PPP: 3.03 (ref 0.84–1.19)
POTASSIUM SERPL-SCNC: 3.5 MMOL/L (ref 3.5–5.3)
PROTHROMBIN TIME: 31.2 SECONDS (ref 11.6–14.5)
SODIUM SERPL-SCNC: 141 MMOL/L (ref 136–145)

## 2020-07-10 PROCEDURE — 85610 PROTHROMBIN TIME: CPT | Performed by: HOSPITALIST

## 2020-07-10 PROCEDURE — 99233 SBSQ HOSP IP/OBS HIGH 50: CPT | Performed by: INTERNAL MEDICINE

## 2020-07-10 PROCEDURE — 97535 SELF CARE MNGMENT TRAINING: CPT

## 2020-07-10 PROCEDURE — 82948 REAGENT STRIP/BLOOD GLUCOSE: CPT

## 2020-07-10 PROCEDURE — 97530 THERAPEUTIC ACTIVITIES: CPT

## 2020-07-10 PROCEDURE — 80048 BASIC METABOLIC PNL TOTAL CA: CPT | Performed by: HOSPITALIST

## 2020-07-10 PROCEDURE — 99232 SBSQ HOSP IP/OBS MODERATE 35: CPT | Performed by: HOSPITALIST

## 2020-07-10 RX ADMIN — Medication 1 TABLET: at 08:17

## 2020-07-10 RX ADMIN — MICONAZOLE NITRATE 1 APPLICATION: 20 CREAM TOPICAL at 17:18

## 2020-07-10 RX ADMIN — METOPROLOL TARTRATE 12.5 MG: 25 TABLET ORAL at 21:55

## 2020-07-10 RX ADMIN — FLUTICASONE FUROATE AND VILANTEROL TRIFENATATE 1 PUFF: 200; 25 POWDER RESPIRATORY (INHALATION) at 08:17

## 2020-07-10 RX ADMIN — INSULIN LISPRO 2 UNITS: 100 INJECTION, SOLUTION INTRAVENOUS; SUBCUTANEOUS at 17:19

## 2020-07-10 RX ADMIN — MICONAZOLE NITRATE 1 APPLICATION: 20 CREAM TOPICAL at 22:06

## 2020-07-10 RX ADMIN — TORSEMIDE 20 MG: 20 TABLET ORAL at 08:17

## 2020-07-10 RX ADMIN — MICONAZOLE NITRATE 1 APPLICATION: 20 CREAM TOPICAL at 08:18

## 2020-07-10 RX ADMIN — MELATONIN 6 MG: at 21:55

## 2020-07-10 RX ADMIN — MELATONIN 2000 UNITS: at 08:17

## 2020-07-10 RX ADMIN — CLOTRIMAZOLE: 10 CREAM TOPICAL at 08:17

## 2020-07-10 RX ADMIN — INSULIN GLARGINE 30 UNITS: 100 INJECTION, SOLUTION SUBCUTANEOUS at 21:56

## 2020-07-10 RX ADMIN — ATORVASTATIN CALCIUM 40 MG: 40 TABLET, FILM COATED ORAL at 17:10

## 2020-07-10 RX ADMIN — METOPROLOL TARTRATE 12.5 MG: 25 TABLET ORAL at 08:17

## 2020-07-10 RX ADMIN — CLOTRIMAZOLE: 10 CREAM TOPICAL at 22:06

## 2020-07-10 NOTE — PLAN OF CARE
Problem: OCCUPATIONAL THERAPY ADULT  Goal: Performs self-care activities at highest level of function for planned discharge setting  See evaluation for individualized goals  Description  Treatment Interventions: ADL retraining, Functional transfer training, UE strengthening/ROM, Endurance training, Cognitive reorientation, Patient/family training, Equipment evaluation/education, Compensatory technique education, Activityengagement, Energy conservation          See flowsheet documentation for full assessment, interventions and recommendations  Outcome: Progressing  Note:   Limitation: Decreased ADL status, Decreased endurance, Decreased cognition, Decreased Safe judgement during ADL, Decreased self-care trans, Decreased high-level ADLs  Prognosis: Fair  Assessment: Pt participated in occupational therapy with focus on activity tolerance, bed mob, unsupported sitting balance and tolerance for pt engagement in functional self-care task/oral care and UB self-care  Pt cleared by MARKIE/ Mo for pt participation in therapy  Pt received HOB raised/supine pt sitting upright and agreeable to therapy following pt Identifiers confirmed  Pt is pleasant and cooperative  He is oriented to x 4  Pt report his is goal to return to home/he would like to drive his care again  Pt required assist for UB self-care 2* pt decreased overall strength and pt deconditioning  Pt will require post acute rehab services to continue to address these above noted pt deficits which currently impair pt ADL and functional mob  OT Discharge Recommendation: Post-Acute Rehabilitation Services  OT - OK to Discharge:  Yes

## 2020-07-10 NOTE — PLAN OF CARE
Problem: PAIN - ADULT  Goal: Verbalizes/displays adequate comfort level or baseline comfort level  Description  Interventions:  - Encourage patient to monitor pain and request assistance  - Assess pain using appropriate pain scale  - Administer analgesics based on type and severity of pain and evaluate response  - Implement non-pharmacological measures as appropriate and evaluate response  - Consider cultural and social influences on pain and pain management  - Notify physician/advanced practitioner if interventions unsuccessful or patient reports new pain  Outcome: Progressing     Problem: INFECTION - ADULT  Goal: Absence or prevention of progression during hospitalization  Description  INTERVENTIONS:  - Assess and monitor for signs and symptoms of infection  - Monitor lab/diagnostic results  - Monitor all insertion sites, i e  indwelling lines, tubes, and drains  - Monitor endotracheal if appropriate and nasal secretions for changes in amount and color  - [place] appropriate cooling/warming therapies per order  - Administer medications as ordered  - Instruct and encourage patient and family to use good hand hygiene technique  - Identify and instruct in appropriate isolation precautions for identified infection/condition  Outcome: Progressing  Goal: Absence of fever/infection during neutropenic period  Description  INTERVENTIONS:  - Monitor WBC    Outcome: Progressing     Problem: SAFETY ADULT  Goal: Patient will remain free of falls  Description  INTERVENTIONS:  - Assess patient frequently for physical needs  -  Identify cognitive and physical deficits and behaviors that affect risk of falls    -  [place] fall precautions as indicated by assessment   - Educate patient/family on patient safety including physical limitations  - Instruct patient to call for assistance with activity based on assessment  - Modify environment to reduce risk of injury  - Consider OT/PT consult to assist with strengthening/mobility  Outcome: Progressing  Goal: Maintain or return to baseline ADL function  Description  INTERVENTIONS:  -  Assess patient's ability to carry out ADLs; assess patient's baseline for ADL function and identify physical deficits which impact ability to perform ADLs (bathing, care of mouth/teeth, toileting, grooming, dressing, etc )  - Assess/evaluate cause of self-care deficits   - Assess range of motion  - Assess patient's mobility; develop plan if impaired  - Assess patient's need for assistive devices and provide as appropriate  - Encourage maximum independence but intervene and supervise when necessary  - Involve family in performance of ADLs  - Assess for home care needs following discharge   - Consider OT consult to assist with ADL evaluation and planning for discharge  - Provide patient education as appropriate  Outcome: Progressing  Goal: Maintain or return mobility status to optimal level  Description  INTERVENTIONS:  - Assess patient's baseline mobility status (ambulation, transfers, stairs, etc )    - Identify cognitive and physical deficits and behaviors that affect mobility  - Identify mobility aids required to assist with transfers and/or ambulation (gait belt, sit-to-stand, lift, walker, cane, etc )  - [place] fall precautions as indicated by assessment  - Record patient progress and toleration of activity level on Mobility SBAR; progress patient to next Phase/Stage  - Instruct patient to call for assistance with activity based on assessment  - Consider rehabilitation consult to assist with strengthening/weightbearing, etc   Outcome: Progressing     Problem: Prexisting or High Potential for Compromised Skin Integrity  Goal: Skin integrity is maintained or improved  Description  INTERVENTIONS:  - Identify patients at risk for skin breakdown  - Assess and monitor skin integrity  - Assess and monitor nutrition and hydration status  - Monitor labs   - Assess for incontinence   - Turn and reposition patient  - Assist with mobility/ambulation  - Relieve pressure over bony prominences  - Avoid friction and shearing  - Provide appropriate hygiene as needed including keeping skin clean and dry  - Evaluate need for skin moisturizer/barrier cream  - Collaborate with interdisciplinary team   - Patient/family teaching  - Consider wound care consult   Outcome: Progressing     Problem: Potential for Falls  Goal: Patient will remain free of falls  Description  INTERVENTIONS:  - Assess patient frequently for physical needs  -  Identify cognitive and physical deficits and behaviors that affect risk of falls  -  [place] fall precautions as indicated by assessment   - Educate patient/family on patient safety including physical limitations  - Instruct patient to call for assistance with activity based on assessment  - Modify environment to reduce risk of injury  - Consider OT/PT consult to assist with strengthening/mobility  Outcome: Progressing     Problem: Nutrition/Hydration-ADULT  Goal: Nutrient/Hydration intake appropriate for improving, restoring or maintaining nutritional needs  Description  Monitor and assess patient's nutrition/hydration status for malnutrition  Collaborate with interdisciplinary team and initiate plan and interventions as ordered  Monitor patient's weight and dietary intake as ordered or per policy  Utilize nutrition screening tool and intervene as necessary  Determine patient's food preferences and provide high-protein, high-caloric foods as appropriate       INTERVENTIONS:  - Monitor oral intake, urinary output, labs, and treatment plans  - Assess nutrition and hydration status and recommend course of action  - Evaluate amount of meals eaten  - Assist patient with eating if necessary   - Allow adequate time for meals  - Recommend/ encourage appropriate diets, oral nutritional supplements, and vitamin/mineral supplements  - Order, calculate, and assess calorie counts as needed  - Recommend, monitor, and adjust tube feedings and TPN/PPN based on assessed needs  - Assess need for intravenous fluids  - Provide specific nutrition/hydration education as appropriate  - Include patient/family/caregiver in decisions related to nutrition  Outcome: Progressing

## 2020-07-10 NOTE — ASSESSMENT & PLAN NOTE
Lab Results   Component Value Date    HGBA1C 8 8 (H) 06/25/2020       Recent Labs     07/09/20  2059 07/10/20  0755 07/10/20  1128 07/10/20  1641   POCGLU 203* 96 145* 160*       Blood Sugar Average: Last 72 hrs:  (P) 531 [id number]   · Uncontrolled due to IV steroid use  · now off steroids & low BS   · Diabetic diet  · Will stop the Humalog and  Cont Lantus to 30 units HS  · Continue sliding scale coverage

## 2020-07-10 NOTE — PROGRESS NOTES
IM Residency Progress Note   Unit/Bed#: [facility] 821-01 Encounter: [id number]      Christiano Holt 81 y o  male [id number]    Hospital Stay Days: 15      Assessment/Plan:    Christiano Holt is a 81 y o  male w/ PMHx of CHF (Grade 3 Diastolic) (COPD severe), Multiple Myeloma on chemo, CKD, HTN and morbid obesity  He presented to the ED with SOB and worsening cough, and diarrhea      Acute Respiratory Failure w/ Hypoxia due to Covid 19 Pneumonia      6/25-Presented to the ED w/ 1 week of SOB and worsening cough  -SIRS positive on Admission- Tachypnic and Tachycardic   -Cxray- w/ B/L consolidation concerning for pneumonia   -Positive For Covid 19  -Elevated Inflammatory markers- CRP, Procal (2 7), Ferritin and IL-6  -Cxray- w/ B/L consolidation concerning for pneumonia    -Started on Moderate Treatment Algorithm for Covid  7/3 Procalc  09 WNL   - Completed 11 days of Rocephin and Doxycycline  - Completed 10 days of IV steroids  - Continue Vitamin C, D and Zinc  - Continue to Require 5-6L of Oxygen   7/8 Cxray- Patchy consolidation throughout the right lung which could be due to pneumonia     7/9 afebrile, no complains of wheezing or worsening of SOB  -Saturations in 90s on 4L NC, Titration goal of 88-92%  -Continue MDI Bronchodilators, Not advisable to use Nebs in the setting of COVID-19 pneumonia  7/10- Afebrile, Oxygen saturations within goal of 88-92%   Chest CT - diffuse B/L groundglass opacities w/ Denser Consolidation in lingula w/ airbronchograms, Follow up suggested 2-3 months     Chronic Obstructive Pulmonary Disease     -Severe COPD as per PFTs   -Stable without any signs of exacerbation  -continue home inhalers  -Saturation goal of 88-92%  -Activity as tolerated      Chronic Diastolic CHF         Wt Readings from Last 3 Encounters:   07/08/20 (!) 142 kg (313 lb 0 9 oz)   06/15/20 (!) 155 kg (341 lb 4 4 oz)   06/08/20 (!) 154 kg (340 lb 9 8 oz)      · Volume status is adequate  · Monitor intake and output  · Monitor daily weights  · Continue torsemide     Chronic Afib     · Rate controlled with metoprolol - decreased to 12 5 b i d  due to heart rate in 40s and 50s  · Warfarin for anticoagulation  · INR of > 6 compared to 2 7 a week ago        7/10- INR of 3 03           Disposition:       Subjective:          Vitals: Temp (24hrs), Av 2 °F (36 8 °C), Min:98 °F (36 7 °C), Max:98 4 °F (36 9 °C)  Current: Temperature: 98 4 °F (36 9 °C)  Vitals:    20 1457 20 2133 07/10/20 0548 07/10/20 0757   BP: 117/72 135/63  115/56   Pulse: 67 72     Resp:   18   Temp: 98 3 °F (36 8 °C) 98 1 °F (36 7 °C)  98 4 °F (36 9 °C)   TempSrc:  Oral     SpO2: 90% 93%     Weight:   (!) 140 kg (309 lb 8 oz)    Height:        Body mass index is 38 68 kg/m²  I/O last 24 hours:   In: 1600 [P O :1600]  Out: 36 [Urine:925]      Physical Exam:    Invasive Devices     Peripheral Intravenous Line            Peripheral IV 20 Right Forearm less than 1 day          Drain            External Urinary Catheter Small 3 days                          Labs:   Recent Results (from the past 24 hour(s))   Fingerstick Glucose (POCT)    Collection Time: 20 12:07 PM   Result Value Ref Range    POC Glucose 150 (H) 65 - 140 mg/dl   Fingerstick Glucose (POCT)    Collection Time: 20  4:37 PM   Result Value Ref Range    POC Glucose 136 65 - 140 mg/dl   Fingerstick Glucose (POCT)    Collection Time: 20  8:59 PM   Result Value Ref Range    POC Glucose 203 (H) 65 - 140 mg/dl   Protime-INR    Collection Time: 07/10/20  5:48 AM   Result Value Ref Range    Protime 31 2 (H) 11 6 - 14 5 seconds    INR 3 03 (H) 0 84 - 0 45   Basic metabolic panel    Collection Time: 07/10/20  5:48 AM   Result Value Ref Range    Sodium 141 136 - 145 mmol/L    Potassium 3 5 3 5 - 5 3 mmol/L    Chloride 105 100 - 108 mmol/L    CO2 31 21 - 32 mmol/L    ANION GAP 5 4 - 13 mmol/L    BUN 40 (H) 5 - 25 mg/dL    Creatinine 1 26 0 60 - 1 30 mg/dL Glucose 116 65 - 140 mg/dL    Calcium 8 8 8 3 - 10 1 mg/dL    eGFR 59 ml/min/1 73sq m   Fingerstick Glucose (POCT)    Collection Time: 07/10/20  7:55 AM   Result Value Ref Range    POC Glucose 96 65 - 140 mg/dl       Radiology Results: I have personally reviewed pertinent reports  Other Diagnostic Testing:   I have personally reviewed pertinent reports          Active Meds:   Current Facility-Administered Medications   Medication Dose Route Frequency    acetaminophen (TYLENOL) tablet 650 mg  650 mg Oral Q6H PRN    albuterol (PROVENTIL HFA,VENTOLIN HFA) inhaler 2 puff  2 puff Inhalation Q6H PRN    atorvastatin (LIPITOR) tablet 40 mg  40 mg Oral After Dinner    bisacodyl (DULCOLAX) rectal suppository 10 mg  10 mg Rectal Daily PRN    cholecalciferol (VITAMIN D3) tablet 2,000 Units  2,000 Units Oral Daily    clotrimazole (LOTRIMIN) 1 % cream   Topical Q12H [facility]    fluticasone-vilanterol (BREO ELLIPTA) 200-25 MCG/INH inhaler 1 puff  1 puff Inhalation Daily    insulin glargine (LANTUS) subcutaneous injection 30 Units 0 3 mL  30 Units Subcutaneous HS    insulin lispro (HumaLOG) 100 units/mL subcutaneous injection 1-5 Units  1-5 Units Subcutaneous HS    insulin lispro (HumaLOG) 100 units/mL subcutaneous injection 2-12 Units  2-12 Units Subcutaneous TID AC    magnesium hydroxide (MILK OF MAGNESIA) 400 mg/5 mL oral suspension 30 mL  30 mL Oral Daily PRN    melatonin tablet 6 mg  6 mg Oral HS    metoprolol tartrate (LOPRESSOR) partial tablet 12 5 mg  12 5 mg Oral Q12H    moisture barrier miconazole 2% cream (aka KHADRA MOISTURE BARRIER ANTIFUNGAL CREAM)  1 application Topical TID    multivitamin-minerals (CENTRUM ADULTS) tablet 1 tablet  1 tablet Oral Daily    torsemide (DEMADEX) tablet 20 mg  20 mg Oral BID (diuretic)         VTE Pharmacologic Prophylaxis: Supratherapuetic INR 3 03  Cardiology/Primary team will consider    Melissa Martin MD  PGY 1

## 2020-07-10 NOTE — SOCIAL WORK
KATY received a call from Kaila at [facility] [employer] confirming the auth# [id number] with Ref# [id number], starting today with a requested review on 7/17 to fax# [phone]

## 2020-07-10 NOTE — PLAN OF CARE
Problem: PHYSICAL THERAPY ADULT  Goal: Performs mobility at highest level of function for planned discharge setting  See evaluation for individualized goals  Description  Treatment/Interventions: Functional transfer training, LE strengthening/ROM, Therapeutic exercise, Endurance training, Cognitive reorientation, Patient/family training, Equipment eval/education, Bed mobility          See flowsheet documentation for full assessment, interventions and recommendations  Outcome: Progressing  Note:   Prognosis: Fair  Problem List: Decreased strength, Decreased range of motion, Decreased endurance, Impaired balance, Decreased mobility, Decreased coordination, Impaired judgement, Decreased safety awareness, Decreased cognition  Assessment: Pt  is a 82 yo M who presents with Acute Respiratory failure with PNA 2/2 COVID-19  Pt  Agreeable to PT session, Pt  Identified with full name and birthdate  Pt  Demonstrated increased functional independence and increased activity tolerance as evidenced above  Pt  tolerated increased sitting balance and demonstrated improved independence with functional transfers as compared to previous session  Pt  Is progressing towards goals, however progress is slowed by complicated PMH and weakness at this time and will benefit from continued skilled therapy to increase functional independence and aid patient in return to PLOF with decreased burden of care  D/C recommendation is rehab when medically appropriate  PT Discharge Recommendation: Post-Acute Rehabilitation Services     PT - OK to Discharge: Yes    See flowsheet documentation for full assessment

## 2020-07-10 NOTE — PROGRESS NOTES
Progress Note - Elidia Velazquez 1950, 81 y o  male MRN: [id number]    Unit/Bed#: [facility] 821-01 Encounter: [id number]    Primary Care Provider: Scott Herrera MD   Date and time admitted to hospital: 6/25/2020 11:43 AM        * Acute respiratory failure with hypoxia due to Pneumonia due to COVID-19 virus  Assessment & Plan  · Pt presents with 1 week symptoms at [facility]  · Upon presentation fell in moderate severity for COVID  · S/p 8-9 days of ceftriaxone and doxycycline  · Was treated with IV steroids for 10 days  · Continue vitamin-C, vitamin-D, zinc supplementation  · Wean O2 as tolerated  · Continue oral torsemide today  · Pulm following, sputum cx ordered  · Curbside discussed with Infectious Disease, no indication for any other treatment regimen at this point of time at this level of oxygen  CT chest: "Bilateral groundglass opacities are fairly diffuse in a heterogeneous pattern  Some areas of consolidation are noted  The findings are suspicious for pneumonia  Atypical organisms are possible including viral In the setting of clinically suspected/proven COVID-19, the above lung parenchymal findings on CT indicate intermediate confidence Follow-up is suggested in 2-3 months time to ensure resolution when the patient is no longer infectious "        Sepsis due to COVID-19 pneumonia [facility])  Assessment & Plan  · Patient presenting with tachycardia, tachypnea, hypoxia and confirm COVID-19 pneumonia  · Now improved    CKD (chronic kidney disease)  Assessment & Plan  · Baseline creatinine around 1 2   · Management as above    Chronic obstructive pulmonary disease, unspecified (HCC)  Assessment & Plan  · Stable without any wheezing  · Continue home inhalers  Type 2 acute myocardial infarction [facility])  Assessment & Plan  · Likely due to acute respiratory failure from COVID-19 pneumonia  · Patient denies any chest pain or shortness of breath    · Will continue warfarin for anticoagulation given his lack of symptoms at this time  · Outpatient cardiac evaluation when stable    Chronic diastolic congestive heart failure [facility])  Assessment & Plan  Wt Readings from Last 3 Encounters:   07/10/20 (!) 140 kg (309 lb 8 oz)   06/15/20 (!) 155 kg (341 lb 4 4 oz)   06/08/20 (!) 154 kg (340 lb 9 8 oz)     · Volume status is adequate  · Monitor intake and output  · Monitor daily weights  · Continue torsemide    Chronic atrial fibrillation (HCC)  Assessment & Plan  · Rate controlled with metoprolol - decreased to 12 5 b i d  due to heart rate in 40s and 50s  · Warfarin for anticoagulation  · INR of > 3, cont off coumadin  Lab Results   Component Value Date    INR 3 03 (H) 07/10/2020    INR 4 21 (H) 07/09/2020    INR 5 25 (HH) 07/08/2020    PROTIME 31 2 (H) 07/10/2020    PROTIME 40 2 (H) 07/09/2020    PROTIME 47 8 (H) 07/08/2020   ·   ·     Essential hypertension  Assessment & Plan  · Well controlled  · Continue metoprolol   · Continue torsemide    Diabetes mellitus type 2, uncontrolled ([facility] )  Assessment & Plan  Lab Results   Component Value Date    HGBA1C 8 8 (H) 06/25/2020       Recent Labs     07/09/20  2059 07/10/20  0755 07/10/20  1128 07/10/20  1641   POCGLU 203* 96 145* 160*       Blood Sugar Average: Last 72 hrs:  (P) 243 [id number]   · Uncontrolled due to IV steroid use  · now off steroids & low BS   · Diabetic diet  · Will stop the Humalog and  Cont Lantus to 30 units HS  · Continue sliding scale coverage        VTE Pharmacologic Prophylaxis:   Pharmacologic: Supratherapeutic INR  Mechanical VTE Prophylaxis in Place: Yes    Patient Centered Rounds: I have performed bedside rounds with nursing staff today  Discussions with Specialists or Other Care Team Provider: CM    Education and Discussions with Family / Patient: Pt    Time Spent for Care: 30 minutes  More than 50% of total time spent on counseling and coordination of care as described above      Current Length of Stay: 15 day(s)    Current Patient Status: Inpatient   Certification Statement: The patient will continue to require additional inpatient hospital stay due to Continues to require inpatient treatment    Discharge Plan: snf    Code Status: Level 1 - Full Code      Subjective:   51-year-old male without any acute complaints today  SOB improving  Objective:     Vitals:   Temp (24hrs), Av 3 °F (36 8 °C), Min:98 1 °F (36 7 °C), Max:98 4 °F (36 9 °C)    Temp:  [98 1 °F (36 7 °C)-98 4 °F (36 9 °C)] 98 4 °F (36 9 °C)  HR:  [51-72] 51  Resp:  [18-20] 20  BP: ()/(56-68) 99/68  SpO2:  [87 %-93 %] 87 %  Body mass index is 38 68 kg/m²  Input and Output Summary (last 24 hours): Intake/Output Summary (Last 24 hours) at 7/10/2020 1655  Last data filed at 7/10/2020 1436  Gross per 24 hour   Intake 640 ml   Output 1125 ml   Net -485 ml       Physical Exam:     Physical Exam   Constitutional: He appears well-developed and well-nourished  HENT:   Head: Normocephalic and atraumatic  Mouth/Throat: Oropharynx is clear and moist    Eyes: Conjunctivae are normal    Neck: Normal range of motion  Cardiovascular: Normal rate, regular rhythm and normal heart sounds  Pulmonary/Chest: Effort normal and breath sounds normal  No respiratory distress  Abdominal: Soft  Bowel sounds are normal    Neurological: He is alert  Skin: Skin is warm and dry  Psychiatric: He has a normal mood and affect  Vitals reviewed          Additional Data:     Labs:    Results from last 7 days   Lab Units 20  0607 20  0557   WBC Thousand/uL 6 37 8 98   HEMOGLOBIN g/dL 13 0 13 2   HEMATOCRIT % 41 4 42 9   PLATELETS Thousands/uL 248 264   NEUTROS PCT %  --  91*   LYMPHS PCT %  --  5*   MONOS PCT %  --  2*   EOS PCT %  --  0     Results from last 7 days   Lab Units 07/10/20  0548   SODIUM mmol/L 141   POTASSIUM mmol/L 3 5   CHLORIDE mmol/L 105   CO2 mmol/L 31   BUN mg/dL 40*   CREATININE mg/dL 1 26   ANION GAP mmol/L 5   CALCIUM mg/dL 8 8   GLUCOSE RANDOM mg/dL 116     Results from last 7 days   Lab Units 07/10/20  0548   INR  3 03*     Results from last 7 days   Lab Units 07/10/20  1641 07/10/20  1128 07/10/20  0755 07/09/20  2059 07/09/20  1637 07/09/20  1207 07/09/20  0747 07/08/20  2022 07/08/20  1635 07/08/20  1216 07/08/20  0814 07/07/20  2116   POC GLUCOSE mg/dl 160* 145* 96 203* 136 150* 105 191* 148* 197* 78 168*                   * I Have Reviewed All Lab Data Listed Above  * Additional Pertinent Lab Tests Reviewed: All Labs Within Last 24 Hours Reviewed    Imaging:    Imaging Reports Reviewed Today Include: ct chest  Imaging Personally Reviewed by Myself Includes:      Recent Cultures (last 7 days):           Last 24 Hours Medication List:     Current Facility-Administered Medications:  acetaminophen 650 mg Oral Q6H PRN Delonte Tuttle MD   albuterol 2 puff Inhalation Q6H PRN Delonte Tuttle MD   atorvastatin 40 mg Oral After Trey Ortiz MD   bisacodyl 10 mg Rectal Daily PRN Delonte Tuttle MD   cholecalciferol 2,000 Units Oral Daily Delonte Tuttle MD   clotrimazole  Topical Q12H Fercho Bustamante MD   fluticasone-vilanterol 1 puff Inhalation Daily Delonte Tuttle MD   insulin glargine 30 Units Subcutaneous HS Nelson Gamez MD   insulin lispro 1-5 Units Subcutaneous HS Eveline Newsome PA-C   insulin lispro 2-12 Units Subcutaneous TID AC Delonte Tuttle MD   magnesium hydroxide 30 mL Oral Daily PRN Delonte Tuttle MD   melatonin 6 mg Oral HS Tuesday M [place]   metoprolol tartrate 12 5 mg Oral Q12H Nelson Gamez MD   KHADRA ANTIFUNGAL 1 application Topical TID Shay Richards MD   multivitamin-minerals 1 tablet Oral Daily Delonte Tuttle MD   torsemide 20 mg Oral BID (diuretic) Shay Richards MD        Today, Patient Was Seen By: Jon Denton MD    ** Please Note: Dictation voice to text software may have been used in the creation of this document   **

## 2020-07-10 NOTE — ASSESSMENT & PLAN NOTE
· Rate controlled with metoprolol - decreased to 12 5 b i d  due to heart rate in 40s and 50s  · Warfarin for anticoagulation  · INR of > 3, cont off coumadin  Lab Results   Component Value Date    INR 3 03 (H) 07/10/2020    INR 4 21 (H) 07/09/2020    INR 5 25 (HH) 07/08/2020    PROTIME 31 2 (H) 07/10/2020    PROTIME 40 2 (H) 07/09/2020    PROTIME 47 8 (H) 07/08/2020   ·   ·

## 2020-07-10 NOTE — SOCIAL WORK
KATY made to two separate calls to   [address] and was forward to [id number], spoke to [address] and [address], CM provided the requested Pt information and received auth# [id number] starting today  KATY was informed by both Nate and Ludy Bench was providing a quick system due to the COVID 19 and the Pt's therapy clinical wasn't required at this time  Both Nate and Jo-Ann reported no auth was required for the Pt's bls transport for d/c today  KATY was in contact with Doug Soria at [place] [address] with the Pt's clinical and the need to switch the Pt's infusion services from their [address] [place] [address] location to the [facility] location  Doug Soria reported she was able to access the Pt's clinicial in the system, cancelled the Pt's previously arranged appts at the [address] [place] [address] location and rescheduled them the [facility] location  Shalonda reported the Pt can start on 7/13 with a 11:30am appt time  CM was in contact with [facility] and made [facility] aware of the obtain auth information, d/c arrangement for today, and Pt Infusion appt on 7/13 at [facility] [phone] at 11:30am  [facility] [facility] reported Ashish Morataya will provide the needed transportation for the Pt's Infusion appts, starting 7/13  Pt for d/c today to [facility], to be transported by [facility] at 6:45pm via bls  KATY completed facility transfer and cmn form  KATY notified Pt, Pt's son Francisco Leach [phone], RN and facility [facility] of d/c arrangements

## 2020-07-10 NOTE — TRANSPORTATION MEDICAL NECESSITY
Section I - General Information    Name of Patient: Elidia Velazquez                 : 1950    Medicare #: [id number]  Transport Date: 07/10/20 (PCS is valid for round trips on this date and for all repetitive trips in the 60-day range as noted below )  Origin: [facility]                                                         Destination: [facility]  Is the pt's stay covered under Medicare Part A (PPS/DRG)   []     Closest appropriate facility? If no, why is transport to more distant facility required? Yes  If hospice pt, is this transport related to pt's terminal illness? No       Section II - Medical Necessity Questionnaire  Ambulance transportation is medically necessary only if other means of transport are contraindicated or would be potentially harmful to the patient  To meet this requirement, the patient must either be "bed confined" or suffer from a condition such that transport by means other than ambulance is contraindicated by the patient's condition  The following questions must be answered by the medical professional signing below for this form to be valid:    1)  Describe the MEDICAL CONDITION (physical and/or mental) of this patient AT [address] [place] [address] that requires the patient to be transported in an ambulance and why transport by other means is contraindicated by the patient's condition: Acute respiratory failure, COVID comfirmed, 4 liters o2, assist x 2, high fall risk and pressure ulcers    2) Is the patient "bed confined" as defined below? No  To be "be confined" the patient must satisfy all three of the following conditions: (1) unable to get up from bed without Assistance; AND (2) unable to ambulate; AND (3) unable to sit in a chair or wheelchair  3) Can this patient safely be transported by car or wheelchair van (i e , seated during transport without a medical attendant or monitoring)?    No    4) In addition to completing questions 1-3 above, please check any of the following conditions that apply*:   *Note: supporting documentation for any boxes checked must be maintained in the patient's medical records  If hosp-hosp transfer, describe services needed at 2nd facility not available at 1st facility? Moderate/severe pain on movement   Requires oxygen-unable to self administer  Unable to tolerate seated position for time needed to transport   Other(specify) High fall risk, COVID confirmed      Section III - Signature of Physician or Healthcare Professional  I certify that the above information is true and correct based on my evaluation of this patient, and represent that the patient requires transport by ambulance and that other forms of transport are contraindicated  I understand that this information will be used by the Centers for Medicare and Medicaid Services (CMS) to support the determination of medical necessity for ambulance services, and I represent that I have personal knowledge of the patient's condition at time of transport  []  If this box is checked, I also certify that the patient is physically or mentally incapable of signing the ambulance service's claim and that the institution with which I am affiliated has furnished care, services, or assistance to the patient  My signature below is made on behalf of the patient pursuant to 42 CFR §424 36(b)(4)   In accordance with 42 CFR §424 37, the specific reason(s) that the patient is physically or mentally incapable of signing the claim form is as follows:       Signature of Physician* or Healthcare Professional______________________________________________________________  Signature Date 07/10/20 (For scheduled repetitive transports, this form is not valid for transports performed more than 60 days after this date)    Printed Name & Credentials of Physician or Healthcare Professional (MD, DO, RN, etc )________________________________  *Form must be signed by patient's attending physician for scheduled, repetitive transports   For non-repetitive, unscheduled ambulance transports, if unable to obtain the signature of the attending physician, any of the following may sign (choose appropriate option below)  [] Physician Assistant []  Clinical Nurse Specialist []  Registered Nurse  []  Nurse Practitioner  [x] Discharge Planner

## 2020-07-10 NOTE — PHYSICAL THERAPY NOTE
PHYSICAL THERAPY Treatment NOTE    Patient Name: Christiano MAURICIO Date: 7/10/2020   [id number]     07/10/20 0846   Pain Assessment   Pain Assessment Tool Pain Assessment not indicated - pt denies pain   Pain Score No Pain   Restrictions/Precautions   Weight Bearing Precautions Per Order No   Other Precautions Airborne/isolation;Contact/isolation;Cognitive; Chair Alarm; Bed Alarm; Fall Risk   General   Chart Reviewed Yes   Additional Pertinent History Pt  is a 82 yo M who presents with Acute Respiratory failure with PNA 2/2 COVID-19  Family/Caregiver Present No   Cognition   Overall Cognitive Status Impaired   Arousal/Participation Cooperative   Attention Attends with cues to redirect   Orientation Level Oriented to person;Oriented to place;Oriented to time;Disoriented to situation   Memory Decreased recall of recent events;Decreased recall of precautions;Decreased short term memory   Following Commands Follows one step commands with increased time or repetition   Comments Pt  was identified with full name and birthdate   Subjective   Subjective Pt  agreeable to PT session   Bed Mobility   Supine to Sit 4  Minimal assistance   Additional items Assist x 1; Increased time required;LE management;HOB elevated; Bedrails   Sit to Supine Unable to assess   Additional Comments Pt  performed supine to sit transfer with minAx1, VCs provided to reposition to appropriate sitting on EOB  Pt  sat at EOB with supervision x 15 mins while performing both UB and LB ADL tasks with OT, VCs provided for repositoning, postural degradation noted with fatigue   Transfers   Sit to Stand 3  Moderate assistance   Additional items Assist x 1   Stand to Sit 3  Moderate assistance   Additional items Assist x 1   Sliding Board transfer 4  Minimal assistance   Additional items Assist x 1; Increased time required;Verbal cues  (for sequencing and body mechanics) Additional Comments Pt  performed sliding board transfer with minAx1 VCs to perform sequenicng for sliding board transfer, minAX1 for board placement and removal and R knee blocked for safety  Attempted sit<>stand with ModAx1 demonstrating ability to unweight bottom however unable to obtain fully erect posture due to reported weakness in RLE  Balance   Static Sitting Fair   Dynamic Sitting Poor +   Endurance Deficit   Endurance Deficit Yes   Endurance Deficit Description postural and balance degradation noted with fatigue   Activity Tolerance   Activity Tolerance Patient limited by fatigue   Medical Staff Made Aware Spoke to Evelyn Neely   Nurse Made Aware Spoke to RN   Assessment   Prognosis Fair   Problem List Decreased strength;Decreased range of motion;Decreased endurance; Impaired balance;Decreased mobility; Decreased coordination; Impaired judgement;Decreased safety awareness;Decreased cognition   Assessment Pt  is a 80 yo M who presents with Acute Respiratory failure with PNA 2/2 COVID-19  Pt  Agreeable to PT session, Pt  Identified with full name and birthdate  Pt  Demonstrated increased functional independence and increased activity tolerance as evidenced above  Pt  tolerated increased sitting balance and demonstrated improved independence with functional transfers as compared to previous session  Pt  Is progressing towards goals, however progress is slowed by complicated PMH and weakness at this time and will benefit from continued skilled therapy to increase functional independence and aid patient in return to PLOF with decreased burden of care  D/C recommendation is rehab when medically appropriate  Goals   Patient Goals to get home   STG Expiration Date 07/20/20   Short Term Goal #2 continue with previously established goals    PT Treatment Day 4   Plan   Treatment/Interventions Functional transfer training;LE strengthening/ROM; Therapeutic exercise; Endurance training;Cognitive reorientation;Patient/family training;Equipment eval/education; Bed mobility;Gait training;Spoke to nursing;Spoke to case management   Progress Progressing toward goals   PT Frequency   (3-5x/wk)   Recommendation   PT Discharge Recommendation Post-Acute Rehabilitation Services   Equipment Recommended Wheelchair;Walker  (sliding board)   PT - OK to Discharge Yes   Additional Comments to rehab when medically appropriate     Sofía Page, PT, DPT 7/10/2020

## 2020-07-10 NOTE — OCCUPATIONAL THERAPY NOTE
Occupational Therapy Treatment Note       07/10/20 0946   Restrictions/Precautions   Weight Bearing Precautions Per Order No   Other Precautions Airborne/isolation;Contact/isolation; Chair Alarm; Bed Alarm; Fall Risk;Cognitive  (Covid -19 positive )   Lifestyle   Autonomy Assistance with ADL's/IADL's, +RW with transfers to w/c, w/c dependent mobility   Reciprocal Relationships facility   Service to Others retired   Intrinsic Gratification bin   Pain Assessment   Pain Assessment Tool 0-10   Pain Score No Pain   ADL   Where Assessed Edge of bed   Grooming Assistance 4  Minimal Assistance   Grooming Deficit Brushing hair;Teeth care;Wash/dry hands; Wash/dry face   UB Bathing Assistance 4  Minimal Assistance   UB Bathing Deficit Right arm;Left arm   UB Dressing Assistance 3  Moderate Assistance   UB Dressing Deficit Thread RUE; Thread LUE;Pull around back   LB Dressing Assistance 2  Maximal Assistance   LB Dressing Deficit Don/doff R sock   Transfers   Sliding Board transfer 4  Minimal assistance   Additional items Assist x 1   Cognition   Overall Cognitive Status Impaired   Arousal/Participation Alert; Cooperative   Attention Attends with cues to redirect   Orientation Level Oriented to person;Oriented to place;Oriented to time;Disoriented to situation   Memory Decreased recall of recent events;Decreased recall of precautions;Decreased short term memory   Following Commands Follows one step commands with increased time or repetition   Activity Tolerance   Activity Tolerance Patient limited by fatigue   Assessment   Assessment Pt participated in occupational therapy with focus on activity tolerance, bed mob, unsupported sitting balance and tolerance for pt engagement in functional self-care task/oral care and UB self-care  Pt cleared by RN/ Mo for pt participation in therapy  Pt received HOB raised/supine pt sitting upright and agreeable to therapy following pt Identifiers confirmed   Pt is pleasant and cooperative  He is oriented to x 4  Pt report his is goal to return to home/he would like to drive his care again  Pt required assist for UB self-care 2* pt decreased overall strength and pt deconditioning  Pt will require post acute rehab services to continue to address these above noted pt deficits which currently impair pt ADL and functional mob       Plan   Treatment Interventions ADL retraining   Goal Expiration Date 07/13/20   OT Treatment Day 4   OT Frequency 3-5x/wk   Recommendation   OT Discharge Recommendation Post-Acute Rehabilitation Services   Barthel Index   Feeding 10   Bathing 0   Grooming Score 5   Dressing Score 5   Bladder Score 5   Bowels Score 5   Toilet Use Score 5   Transfers (Bed/Chair) Score 5   Mobility (Level Surface) Score 0   Stairs Score 0   Barthel Index Score 40   Modified [place] Scale   Modified [place] Scale 4     Rober GALLEGO/L

## 2020-07-10 NOTE — SOCIAL WORK
Pt no longer medically ready for d/c today  CM notified Luke Decker and Pt's son Adis Cehn aware of the d/c cancellation  CM will continue to follow

## 2020-07-10 NOTE — ASSESSMENT & PLAN NOTE
Wt Readings from Last 3 Encounters:   07/10/20 (!) 140 kg (309 lb 8 oz)   06/15/20 (!) 155 kg (341 lb 4 4 oz)   06/08/20 (!) 154 kg (340 lb 9 8 oz)     · Volume status is adequate  · Monitor intake and output  · Monitor daily weights  · Continue torsemide

## 2020-07-10 NOTE — ASSESSMENT & PLAN NOTE
· Pt presents with 1 week symptoms at [facility]  · Upon presentation fell in moderate severity for COVID  · S/p 8-9 days of ceftriaxone and doxycycline  · Was treated with IV steroids for 10 days  · Continue vitamin-C, vitamin-D, zinc supplementation  · Wean O2 as tolerated  · Continue oral torsemide today  · Pulm following, sputum cx ordered  · Curbside discussed with Infectious Disease, no indication for any other treatment regimen at this point of time at this level of oxygen  CT chest: "Bilateral groundglass opacities are fairly diffuse in a heterogeneous pattern  Some areas of consolidation are noted  The findings are suspicious for pneumonia    Atypical organisms are possible including viral In the setting of clinically suspected/proven COVID-19, the above lung parenchymal findings on CT indicate intermediate confidence Follow-up is suggested in 2-3 months time to ensure resolution when the patient is no longer infectious "

## 2020-07-10 NOTE — PROGRESS NOTES
Attempted to talk with pt on the phone   Pt declined  support      07/10/20 1500   Clinical Encounter Type   Visited With Patient   Routine Visit Introduction

## 2020-07-11 LAB
GLUCOSE SERPL-MCNC: 106 MG/DL (ref 65–140)
GLUCOSE SERPL-MCNC: 138 MG/DL (ref 65–140)
GLUCOSE SERPL-MCNC: 155 MG/DL (ref 65–140)
GLUCOSE SERPL-MCNC: 157 MG/DL (ref 65–140)

## 2020-07-11 PROCEDURE — 82948 REAGENT STRIP/BLOOD GLUCOSE: CPT

## 2020-07-11 PROCEDURE — 99232 SBSQ HOSP IP/OBS MODERATE 35: CPT | Performed by: HOSPITALIST

## 2020-07-11 PROCEDURE — 99232 SBSQ HOSP IP/OBS MODERATE 35: CPT | Performed by: INTERNAL MEDICINE

## 2020-07-11 PROCEDURE — 87070 CULTURE OTHR SPECIMN AEROBIC: CPT | Performed by: STUDENT IN AN ORGANIZED HEALTH CARE EDUCATION/TRAINING PROGRAM

## 2020-07-11 PROCEDURE — 87205 SMEAR GRAM STAIN: CPT | Performed by: STUDENT IN AN ORGANIZED HEALTH CARE EDUCATION/TRAINING PROGRAM

## 2020-07-11 PROCEDURE — 87107 FUNGI IDENTIFICATION MOLD: CPT | Performed by: STUDENT IN AN ORGANIZED HEALTH CARE EDUCATION/TRAINING PROGRAM

## 2020-07-11 RX ORDER — WARFARIN SODIUM 5 MG/1
5 TABLET ORAL
Status: COMPLETED | OUTPATIENT
Start: 2020-07-11 | End: 2020-07-11

## 2020-07-11 RX ADMIN — CLOTRIMAZOLE 1 APPLICATION: 10 CREAM TOPICAL at 21:29

## 2020-07-11 RX ADMIN — METOPROLOL TARTRATE 12.5 MG: 25 TABLET ORAL at 08:25

## 2020-07-11 RX ADMIN — MICONAZOLE NITRATE 1 APPLICATION: 20 CREAM TOPICAL at 16:55

## 2020-07-11 RX ADMIN — TORSEMIDE 20 MG: 20 TABLET ORAL at 16:57

## 2020-07-11 RX ADMIN — INSULIN LISPRO 2 UNITS: 100 INJECTION, SOLUTION INTRAVENOUS; SUBCUTANEOUS at 12:39

## 2020-07-11 RX ADMIN — Medication 1 TABLET: at 08:25

## 2020-07-11 RX ADMIN — INSULIN LISPRO 2 UNITS: 100 INJECTION, SOLUTION INTRAVENOUS; SUBCUTANEOUS at 16:55

## 2020-07-11 RX ADMIN — CLOTRIMAZOLE: 10 CREAM TOPICAL at 08:25

## 2020-07-11 RX ADMIN — MELATONIN 6 MG: at 21:28

## 2020-07-11 RX ADMIN — WARFARIN SODIUM 5 MG: 5 TABLET ORAL at 21:45

## 2020-07-11 RX ADMIN — FLUTICASONE FUROATE AND VILANTEROL TRIFENATATE 1 PUFF: 200; 25 POWDER RESPIRATORY (INHALATION) at 08:26

## 2020-07-11 RX ADMIN — ATORVASTATIN CALCIUM 40 MG: 40 TABLET, FILM COATED ORAL at 17:04

## 2020-07-11 RX ADMIN — MICONAZOLE NITRATE 1 APPLICATION: 20 CREAM TOPICAL at 08:26

## 2020-07-11 RX ADMIN — MICONAZOLE NITRATE 1 APPLICATION: 20 CREAM TOPICAL at 21:29

## 2020-07-11 RX ADMIN — TORSEMIDE 20 MG: 20 TABLET ORAL at 08:25

## 2020-07-11 RX ADMIN — MELATONIN 2000 UNITS: at 08:25

## 2020-07-11 RX ADMIN — INSULIN GLARGINE 30 UNITS: 100 INJECTION, SOLUTION SUBCUTANEOUS at 21:31

## 2020-07-11 NOTE — NURSING NOTE
OOB to chair for lunch  Transferred self with supervision, utilizing transfer board  Chair alarm on, call bell in reach  All personal items in reach

## 2020-07-11 NOTE — ASSESSMENT & PLAN NOTE
Wt Readings from Last 3 Encounters:   07/11/20 (!) 140 kg (309 lb 6 4 oz)   06/15/20 (!) 155 kg (341 lb 4 4 oz)   06/08/20 (!) 154 kg (340 lb 9 8 oz)     · Volume status is adequate  · Monitor intake and output  · Monitor daily weights  · Continue torsemide

## 2020-07-11 NOTE — PROGRESS NOTES
Pulmonary Progress Note  Radha Barcenas 81 y o  male MRN: [id number]  Unit/Bed#: [facility] 821-01 Encounter: [id number]      Assesment and Recommendations:    1  Hypoxic respiratory failure secondary to COVID-19 viral pneumonia with possible superimposed bacterial infection - remained on room air overnight, however on 2 L nasal cannula this morning  Sputum culture pending  · I placed him back on room air - will need respiratory assessment prior to discharge to see if he needs oxygen with ambulation  · Continue to follow up sputum culture  · He completed 11 days of Rocephin and doxycycline, completed 10 days of IV steroids  · Continue multi vitamin, vitamin-D and Lipitor  · With negative procalcitonin, would elect to continue to observe off antibiotics    2  Severe COPD without acute exacerbation  · Continue Breo 1 puff once daily  · Albuterol p r n     3  Chronic diastolic congestive heart failure  · Appears euvolemic  · Continue torsemide    He appears stable from a pulmonary standpoint, will sign off, please call with any questions  I discussed with Dr Andres Mena     Chief Complaint:  Doing better    Subjective:  Overall the patient reports that he is feeling better  He continues to have some mild dyspnea  He has minimal cough but is not bringing up any phlegm  He denies any chest pain or wheezing  Vitals: Blood pressure 120/66, pulse 70, temperature 98 °F (36 7 °C), resp  rate 20, height 6' 3" (1 905 m), weight (!) 140 kg (309 lb 6 4 oz), SpO2 92 %  , 2 L nasal cannula, Body mass index is 38 67 kg/m²        Intake/Output Summary (Last 24 hours) at 7/11/2020 1257  Last data filed at 7/11/2020 1138  Gross per 24 hour   Intake 700 ml   Output 975 ml   Net -275 ml       Physical exam:  General:  Patient is awake, alert, non-toxic and in no acute respiratory distress  Neck: No JVD  CV:  Regular, +S1 and S2, No murmurs, gallops or rubs appreciated  Lungs:  Decreased breath sounds throughout without significant wheeze, rales or rhonchi  Abdomen: Soft, +BS, Non-tender, distended  Extremities:  Right lower extremity pitting edema with chronic venous stasis changes, left AKA No clubbing, cyanosis  Neuro: No focal deficits      Nallely Fowler DO      Portions of the record may have been created with voice recognition software  Occasional wrong word or "sound a like" substitutions may have occurred due to the inherent limitations of voice recognition software  Read the chart carefully and recognize, using context, where substitutions have occurred

## 2020-07-11 NOTE — PROGRESS NOTES
Progress Note - Delisa Day 1950, 81 y o  male MRN: [id number]    Unit/Bed#: [facility] 821-01 Encounter: [id number]    Primary Care Provider: Eugenio Baeza MD   Date and time admitted to hospital: 6/25/2020 11:43 AM        Sepsis due to COVID-19 pneumonia [facility])  Assessment & Plan  · Patient presenting with tachycardia, tachypnea, hypoxia and confirm COVID-19 pneumonia  · Now improved    CKD (chronic kidney disease)  Assessment & Plan  · Baseline creatinine around 1 2   · Management as above    Chronic obstructive pulmonary disease, unspecified (HCC)  Assessment & Plan  · Stable without any wheezing  · Continue home inhalers  Type 2 acute myocardial infarction [facility])  Assessment & Plan  · Likely due to acute respiratory failure from COVID-19 pneumonia  · Patient denies any chest pain or shortness of breath  · Will continue warfarin for anticoagulation given his lack of symptoms at this time  · Outpatient cardiac evaluation when stable    Chronic diastolic congestive heart failure [facility])  Assessment & Plan  Wt Readings from Last 3 Encounters:   07/11/20 (!) 140 kg (309 lb 6 4 oz)   06/15/20 (!) 155 kg (341 lb 4 4 oz)   06/08/20 (!) 154 kg (340 lb 9 8 oz)     · Volume status is adequate  · Monitor intake and output  · Monitor daily weights  · Continue torsemide    Chronic atrial fibrillation (HCC)  Assessment & Plan  · Rate controlled with metoprolol - decreased to 12 5 b i d  due to heart rate in 40s and 50s  · Warfarin for anticoagulation  · INR should be < 3 today, start coumadin 5 mg  Lab Results   Component Value Date    INR 3 03 (H) 07/10/2020    INR 4 21 (H) 07/09/2020    INR 5 25 (HH) 07/08/2020    PROTIME 31 2 (H) 07/10/2020    PROTIME 40 2 (H) 07/09/2020    PROTIME 47 8 (H) 07/08/2020     ·     Essential hypertension  Assessment & Plan  · Well controlled    · Continue metoprolol   · Continue torsemide    Di[facility], [facility]  Assessment & Plan  Lab Results   Component Value Date    HGBA1C 8 8 (H) 06/25/2020       Recent Labs     07/10/20  2158 07/11/20  0746 07/11/20  1106 07/11/20  1618   POCGLU 143* 106 157* 155*       Blood Sugar Average: Last 72 hrs:  (P) 063 [id number]   · Uncontrolled due to IV steroid use  · now off steroids & low BS   · Diabetic diet  · Will stop the Humalog and  Cont Lantus to 30 units HS  · Continue sliding scale coverage    * Acute respiratory failure with hypoxia due to Pneumonia due to COVID-19 virus  Assessment & Plan  · Pt presents with 1 week symptoms at [facility]  · Upon presentation fell in moderate severity for COVID  · S/p 8-9 days of ceftriaxone and doxycycline  · Was treated with IV steroids for 10 days  · Continue vitamin-C, vitamin-D, zinc supplementation  · Wean O2 as tolerated  · Continue oral torsemide today  · Pulm following, sputum cx ordered  · Curbside discussed with Infectious Disease, no indication for any other treatment regimen at this point of time at this level of oxygen  · CT chest: "Bilateral groundglass opacities are fairly diffuse in a heterogeneous pattern  Some areas of consolidation are noted  The findings are suspicious for pneumonia    Atypical organisms are possible including viral In the setting of clinically suspected/proven COVID-19, the above lung parenchymal findings on CT indicate intermediate confidence Follow-up is suggested in 2-3 months time to ensure resolution when the patient is no longer infectious "  · Pulmonary recommended to check sputum cultures rule out any superimposed bacterial infection also continue to titrate his O2  · If he is ongoingly improving in regards to supplemental O2 and per them results from sputum are not growing anything much he will be stable for [address]  [facility] Internal Medicine Progress Note  Patient: Pankaj Rees 81 y o  male   MRN: [id number]  PCP: Jae Bernabe MD  Unit/Bed#: [facility] 821-01 Encounter: [id number]  Date Of Visit: 20    Assessment:    Principal Problem:    Acute respiratory failure with hypoxia due to Pneumonia due to COVID-19 virus  Active Problems:    Diabetes mellitus type 2, uncontrolled (HCC)    Essential hypertension    Chronic atrial fibrillation (HCC)    Chronic diastolic congestive heart failure (HCC)    Type 2 acute myocardial infarction (HCC)    Chronic obstructive pulmonary disease, unspecified (HCC)    CKD (chronic kidney disease)    Sepsis due to COVID-19 pneumonia (HCC)      Plan:         VTE Pharmacologic Prophylaxis:   Pharmacologic: Warfarin (Coumadin)  Mechanical VTE Prophylaxis in Place: Yes    Patient Centered Rounds: I have performed bedside rounds with nursing staff today  Discussions with Specialists or Other Care Team Provider:     Education and Discussions with Family / Patient: patient    Time Spent for Care: 30 minutes  More than 50% of total time spent on counseling and coordination of care as described above  Current Length of Stay: 16 day(s)    Current Patient Status: Inpatient   Certification Statement: The patient will continue to require additional inpatient hospital stay due to f/u sputum cultures, monitor O2    Discharge Plan / Estimated Discharge Date:     Code Status: Level 1 - Full Code      Subjective:   Feels ok, no CP or SOB  Per nurse his sputum was brown    Objective:     Vitals:   Temp (24hrs), Av 4 °F (36 9 °C), Min:98 °F (36 7 °C), Max:99 1 °F (37 3 °C)    Temp:  [98 °F (36 7 °C)-99 1 °F (37 3 °C)] 99 1 °F (37 3 °C)  HR:  [43-70] 49  Resp:  [16] 16  BP: (120-133)/(66-90) 123/90  SpO2:  [87 %-92 %] 92 %  Body mass index is 38 67 kg/m²  Input and Output Summary (last 24 hours): Intake/Output Summary (Last 24 hours) at 2020 1813  Last data filed at 2020 1300  Gross per 24 hour   Intake 720 ml   Output 650 ml   Net 70 ml       Physical Exam:     Physical Exam   Constitutional: He appears well-developed and well-nourished     HENT:   Head: Normocephalic and atraumatic  Eyes: Conjunctivae are normal    Cardiovascular: Normal rate and regular rhythm  No murmur heard  Pulmonary/Chest: Effort normal  No stridor  No respiratory distress  Abdominal: Soft  He exhibits no distension  There is no tenderness  Neurological: He is alert  Vitals reviewed  Additional Data:     Labs:    Results from last 7 days   Lab Units 07/07/20  0607 07/06/20  0557   WBC Thousand/uL 6 37 8 98   HEMOGLOBIN g/dL 13 0 13 2   HEMATOCRIT % 41 4 42 9   PLATELETS Thousands/uL 248 264   NEUTROS PCT %  --  91*   LYMPHS PCT %  --  5*   MONOS PCT %  --  2*   EOS PCT %  --  0     Results from last 7 days   Lab Units 07/10/20  0548   POTASSIUM mmol/L 3 5   CHLORIDE mmol/L 105   CO2 mmol/L 31   BUN mg/dL 40*   CREATININE mg/dL 1 26   CALCIUM mg/dL 8 8     Results from last 7 days   Lab Units 07/10/20  0548   INR  3 03*       * I Have Reviewed All Lab Data Listed Above    * Additional Pertinent Lab Tests Reviewed: No New Labs Available For Today    Imaging:    Imaging Reports Reviewed Today Include:   Imaging Personally Reviewed by Myself Includes:      Recent Cultures (last 7 days):           Last 24 Hours Medication List:     Current Facility-Administered Medications:  acetaminophen 650 mg Oral Q6H PRN Rabia Swain MD   albuterol 2 puff Inhalation Q6H PRN Rabia Swain MD   atorvastatin 40 mg Oral After John Sanchez MD   bisacodyl 10 mg Rectal Daily PRN Rabia Swain MD   cholecalciferol 2,000 Units Oral Daily Rabia Swain MD   clotrimazole  Topical Q12H Fabienne Sherman MD   fluticasone-vilanterol 1 puff Inhalation Daily Rabia Swain MD   insulin glargine 30 Units Subcutaneous HS Skip Cox MD   insulin lispro 1-5 Units Subcutaneous HS Samantha Cartwright PA-C   insulin lispro 2-12 Units Subcutaneous TID AC Rabia Swain MD   magnesium hydroxide 30 mL Oral Daily PRN Rabia Swain MD   melatonin 6 mg Oral  NICOLA Randle   metoprolol tartrate 12 5 mg Oral Q12H Alexander Tineo MD   KHADRA ANTIFUNGAL 1 application Topical TID Estella Elizondo MD   multivitamin-minerals 1 tablet Oral Daily Esthela Chew MD   torsemide 20 mg Oral BID (diuretic) Estella Elizondo MD   warfarin 5 mg Oral Once (warfarin) Alexander Tineo MD        Today, Patient Was Seen By: Alexander Tineo MD    ** Please Note: This note has been constructed using a voice recognition system   **

## 2020-07-11 NOTE — ASSESSMENT & PLAN NOTE
Lab Results   Component Value Date    HGBA1C 8 8 (H) 06/25/2020       Recent Labs     07/10/20  2158 07/11/20  0746 07/11/20  1106 07/11/20  1618   POCGLU 143* 106 157* 155*       Blood Sugar Average: Last 72 hrs:  (P) 108 [id number]   · Uncontrolled due to IV steroid use  · now off steroids & low BS   · Diabetic diet  · Will stop the Humalog and  Cont Lantus to 30 units HS  · Continue sliding scale coverage

## 2020-07-11 NOTE — ASSESSMENT & PLAN NOTE
· Pt presents with 1 week symptoms at [facility]  · Upon presentation fell in moderate severity for COVID  · S/p 8-9 days of ceftriaxone and doxycycline  · Was treated with IV steroids for 10 days  · Continue vitamin-C, vitamin-D, zinc supplementation  · Wean O2 as tolerated  · Continue oral torsemide today  · Pulm following, sputum cx ordered  · Curbside discussed with Infectious Disease, no indication for any other treatment regimen at this point of time at this level of oxygen  · CT chest: "Bilateral groundglass opacities are fairly diffuse in a heterogeneous pattern  Some areas of consolidation are noted  The findings are suspicious for pneumonia    Atypical organisms are possible including viral In the setting of clinically suspected/proven COVID-19, the above lung parenchymal findings on CT indicate intermediate confidence Follow-up is suggested in 2-3 months time to ensure resolution when the patient is no longer infectious "  · Pulmonary recommended to check sputum cultures rule out any superimposed bacterial infection also continue to titrate his O2  · If he is ongoingly improving in regards to supplemental O2 and per them results from sputum are not growing anything much he will be stable for DC

## 2020-07-11 NOTE — ASSESSMENT & PLAN NOTE
· Rate controlled with metoprolol - decreased to 12 5 b i d  due to heart rate in 40s and 50s  · Warfarin for anticoagulation  · INR should be < 3 today, start coumadin 5 mg  Lab Results   Component Value Date    INR 3 03 (H) 07/10/2020    INR 4 21 (H) 07/09/2020    INR 5 25 (HH) 07/08/2020    PROTIME 31 2 (H) 07/10/2020    PROTIME 40 2 (H) 07/09/2020    PROTIME 47 8 (H) 07/08/2020     ·

## 2020-07-12 LAB
ANION GAP SERPL CALCULATED.3IONS-SCNC: 7 MMOL/L (ref 4–13)
BUN SERPL-MCNC: 28 MG/DL (ref 5–25)
CALCIUM SERPL-MCNC: 8.3 MG/DL (ref 8.3–10.1)
CHLORIDE SERPL-SCNC: 102 MMOL/L (ref 100–108)
CO2 SERPL-SCNC: 28 MMOL/L (ref 21–32)
CREAT SERPL-MCNC: 1.12 MG/DL (ref 0.6–1.3)
GFR SERPL CREATININE-BSD FRML MDRD: 68 ML/MIN/1.73SQ M
GLUCOSE SERPL-MCNC: 131 MG/DL (ref 65–140)
GLUCOSE SERPL-MCNC: 141 MG/DL (ref 65–140)
GLUCOSE SERPL-MCNC: 177 MG/DL (ref 65–140)
GLUCOSE SERPL-MCNC: 92 MG/DL (ref 65–140)
INR PPP: 2.46 (ref 0.84–1.19)
POTASSIUM SERPL-SCNC: 3.2 MMOL/L (ref 3.5–5.3)
PROTHROMBIN TIME: 26.5 SECONDS (ref 11.6–14.5)
SODIUM SERPL-SCNC: 137 MMOL/L (ref 136–145)

## 2020-07-12 PROCEDURE — 99232 SBSQ HOSP IP/OBS MODERATE 35: CPT | Performed by: HOSPITALIST

## 2020-07-12 PROCEDURE — 80048 BASIC METABOLIC PNL TOTAL CA: CPT | Performed by: HOSPITALIST

## 2020-07-12 PROCEDURE — 85610 PROTHROMBIN TIME: CPT | Performed by: HOSPITALIST

## 2020-07-12 PROCEDURE — 82948 REAGENT STRIP/BLOOD GLUCOSE: CPT

## 2020-07-12 RX ORDER — AMOXICILLIN 250 MG
1 CAPSULE ORAL 2 TIMES DAILY
Status: DISCONTINUED | OUTPATIENT
Start: 2020-07-12 | End: 2020-07-14 | Stop reason: HOSPADM

## 2020-07-12 RX ORDER — WARFARIN SODIUM 5 MG/1
5 TABLET ORAL
Status: COMPLETED | OUTPATIENT
Start: 2020-07-12 | End: 2020-07-12

## 2020-07-12 RX ADMIN — TORSEMIDE 20 MG: 20 TABLET ORAL at 16:00

## 2020-07-12 RX ADMIN — METOPROLOL TARTRATE 12.5 MG: 25 TABLET ORAL at 21:17

## 2020-07-12 RX ADMIN — Medication 1 TABLET: at 08:25

## 2020-07-12 RX ADMIN — WARFARIN SODIUM 5 MG: 5 TABLET ORAL at 17:15

## 2020-07-12 RX ADMIN — CLOTRIMAZOLE: 10 CREAM TOPICAL at 21:14

## 2020-07-12 RX ADMIN — MICONAZOLE NITRATE 1 APPLICATION: 20 CREAM TOPICAL at 16:00

## 2020-07-12 RX ADMIN — MELATONIN 2000 UNITS: at 08:25

## 2020-07-12 RX ADMIN — ATORVASTATIN CALCIUM 40 MG: 40 TABLET, FILM COATED ORAL at 17:15

## 2020-07-12 RX ADMIN — MICONAZOLE NITRATE 1 APPLICATION: 20 CREAM TOPICAL at 21:14

## 2020-07-12 RX ADMIN — FLUTICASONE FUROATE AND VILANTEROL TRIFENATATE 1 PUFF: 200; 25 POWDER RESPIRATORY (INHALATION) at 08:26

## 2020-07-12 RX ADMIN — METOPROLOL TARTRATE 12.5 MG: 25 TABLET ORAL at 08:26

## 2020-07-12 RX ADMIN — MELATONIN 6 MG: at 21:14

## 2020-07-12 RX ADMIN — CLOTRIMAZOLE: 10 CREAM TOPICAL at 08:26

## 2020-07-12 RX ADMIN — DOCUSATE SODIUM AND SENNOSIDES 1 TABLET: 8.6; 5 TABLET ORAL at 17:15

## 2020-07-12 RX ADMIN — TORSEMIDE 20 MG: 20 TABLET ORAL at 08:26

## 2020-07-12 RX ADMIN — MICONAZOLE NITRATE 1 APPLICATION: 20 CREAM TOPICAL at 08:27

## 2020-07-12 RX ADMIN — INSULIN LISPRO 1 UNITS: 100 INJECTION, SOLUTION INTRAVENOUS; SUBCUTANEOUS at 21:17

## 2020-07-12 RX ADMIN — INSULIN GLARGINE 30 UNITS: 100 INJECTION, SOLUTION SUBCUTANEOUS at 21:17

## 2020-07-12 NOTE — ASSESSMENT & PLAN NOTE
Wt Readings from Last 3 Encounters:   07/12/20 (!) 140 kg (308 lb 10 3 oz)   06/15/20 (!) 155 kg (341 lb 4 4 oz)   06/08/20 (!) 154 kg (340 lb 9 8 oz)     · Volume status is adequate  · Monitor intake and output  · Monitor daily weights  · Continue torsemide

## 2020-07-12 NOTE — ASSESSMENT & PLAN NOTE
· Pt presents with 1 week symptoms at [facility]  · Upon presentation fell in moderate severity for COVID  · S/p 8-9 days of ceftriaxone and doxycycline  · Was treated with IV steroids for 10 days  · Continue vitamin-C, vitamin-D, zinc supplementation  · Wean O2 as tolerated  · Continue oral torsemide today  · Pulm following, sputum cx ordered  · Curbside discussed with Infectious Disease, no indication for any other treatment regimen at this point of time at this level of oxygen  · CT chest: "Bilateral groundglass opacities are fairly diffuse in a heterogeneous pattern  Some areas of consolidation are noted  The findings are suspicious for pneumonia    Atypical organisms are possible including viral In the setting of clinically suspected/proven COVID-19, the above lung parenchymal findings on CT indicate intermediate confidence Follow-up is suggested in 2-3 months time to ensure resolution when the patient is no longer infectious "  · Pulmonary recommended to check sputum cultures rule out any superimposed bacterial infection also continue to titrate his O2  · F/u sputum results

## 2020-07-12 NOTE — PLAN OF CARE
Problem: PAIN - ADULT  Goal: Verbalizes/displays adequate comfort level or baseline comfort level  Description  Interventions:  - Encourage patient to monitor pain and request assistance  - Assess pain using appropriate pain scale  - Administer analgesics based on type and severity of pain and evaluate response  - Implement non-pharmacological measures as appropriate and evaluate response  - Consider cultural and social influences on pain and pain management  - Notify physician/advanced practitioner if interventions unsuccessful or patient reports new pain  Outcome: Progressing     Problem: INFECTION - ADULT  Goal: Absence or prevention of progression during hospitalization  Description  INTERVENTIONS:  - Assess and monitor for signs and symptoms of infection  - Monitor lab/diagnostic results  - Monitor all insertion sites, i e  indwelling lines, tubes, and drains  - Monitor endotracheal if appropriate and nasal secretions for changes in amount and color  - [place] appropriate cooling/warming therapies per order  - Administer medications as ordered  - Instruct and encourage patient and family to use good hand hygiene technique  - Identify and instruct in appropriate isolation precautions for identified infection/condition  Outcome: Progressing  Goal: Absence of fever/infection during neutropenic period  Description  INTERVENTIONS:  - Monitor WBC    Outcome: Progressing     Problem: SAFETY ADULT  Goal: Patient will remain free of falls  Description  INTERVENTIONS:  - Assess patient frequently for physical needs  -  Identify cognitive and physical deficits and behaviors that affect risk of falls    -  [place] fall precautions as indicated by assessment   - Educate patient/family on patient safety including physical limitations  - Instruct patient to call for assistance with activity based on assessment  - Modify environment to reduce risk of injury  - Consider OT/PT consult to assist with strengthening/mobility  Outcome: Progressing  Goal: Maintain or return to baseline ADL function  Description  INTERVENTIONS:  -  Assess patient's ability to carry out ADLs; assess patient's baseline for ADL function and identify physical deficits which impact ability to perform ADLs (bathing, care of mouth/teeth, toileting, grooming, dressing, etc )  - Assess/evaluate cause of self-care deficits   - Assess range of motion  - Assess patient's mobility; develop plan if impaired  - Assess patient's need for assistive devices and provide as appropriate  - Encourage maximum independence but intervene and supervise when necessary  - Involve family in performance of ADLs  - Assess for home care needs following discharge   - Consider OT consult to assist with ADL evaluation and planning for discharge  - Provide patient education as appropriate  Outcome: Progressing  Goal: Maintain or return mobility status to optimal level  Description  INTERVENTIONS:  - Assess patient's baseline mobility status (ambulation, transfers, stairs, etc )    - Identify cognitive and physical deficits and behaviors that affect mobility  - Identify mobility aids required to assist with transfers and/or ambulation (gait belt, sit-to-stand, lift, walker, cane, etc )  - [place] fall precautions as indicated by assessment  - Record patient progress and toleration of activity level on Mobility SBAR; progress patient to next Phase/Stage  - Instruct patient to call for assistance with activity based on assessment  - Consider rehabilitation consult to assist with strengthening/weightbearing, etc   Outcome: Progressing     Problem: Prexisting or High Potential for Compromised Skin Integrity  Goal: Skin integrity is maintained or improved  Description  INTERVENTIONS:  - Identify patients at risk for skin breakdown  - Assess and monitor skin integrity  - Assess and monitor nutrition and hydration status  - Monitor labs   - Assess for incontinence   - Turn and reposition patient  - Assist with mobility/ambulation  - Relieve pressure over bony prominences  - Avoid friction and shearing  - Provide appropriate hygiene as needed including keeping skin clean and dry  - Evaluate need for skin moisturizer/barrier cream  - Collaborate with interdisciplinary team   - Patient/family teaching  - Consider wound care consult   Outcome: Progressing     Problem: Potential for Falls  Goal: Patient will remain free of falls  Description  INTERVENTIONS:  - Assess patient frequently for physical needs  -  Identify cognitive and physical deficits and behaviors that affect risk of falls  -  [place] fall precautions as indicated by assessment   - Educate patient/family on patient safety including physical limitations  - Instruct patient to call for assistance with activity based on assessment  - Modify environment to reduce risk of injury  - Consider OT/PT consult to assist with strengthening/mobility  Outcome: Progressing     Problem: Nutrition/Hydration-ADULT  Goal: Nutrient/Hydration intake appropriate for improving, restoring or maintaining nutritional needs  Description  Monitor and assess patient's nutrition/hydration status for malnutrition  Collaborate with interdisciplinary team and initiate plan and interventions as ordered  Monitor patient's weight and dietary intake as ordered or per policy  Utilize nutrition screening tool and intervene as necessary  Determine patient's food preferences and provide high-protein, high-caloric foods as appropriate       INTERVENTIONS:  - Monitor oral intake, urinary output, labs, and treatment plans  - Assess nutrition and hydration status and recommend course of action  - Evaluate amount of meals eaten  - Assist patient with eating if necessary   - Allow adequate time for meals  - Recommend/ encourage appropriate diets, oral nutritional supplements, and vitamin/mineral supplements  - Order, calculate, and assess calorie counts as needed  - Recommend, monitor, and adjust tube feedings and TPN/PPN based on assessed needs  - Assess need for intravenous fluids  - Provide specific nutrition/hydration education as appropriate  - Include patient/family/caregiver in decisions related to nutrition  Outcome: Progressing

## 2020-07-12 NOTE — PROGRESS NOTES
Progress Note - Frances Salomon 1950, 81 y o  male MRN: [id number]    Unit/Bed#: [facility] 821-01 Encounter: [id number]    Primary Care Provider: Willi Edmond MD   Date and time admitted to hospital: 6/25/2020 11:43 AM        Sepsis due to COVID-19 pneumonia [facility])  Assessment & Plan  · Patient presenting with tachycardia, tachypnea, hypoxia and confirm COVID-19 pneumonia  · Now improved    CKD (chronic kidney disease)  Assessment & Plan  · Baseline creatinine around 1 2   · Management as above    Chronic obstructive pulmonary disease, unspecified (HCC)  Assessment & Plan  · Stable without any wheezing  · Continue home inhalers  Type 2 acute myocardial infarction [facility])  Assessment & Plan  · Likely due to acute respiratory failure from COVID-19 pneumonia  · Patient denies any chest pain or shortness of breath  · Will continue warfarin for anticoagulation given his lack of symptoms at this time  · Outpatient cardiac evaluation when stable    Chronic diastolic congestive heart failure [facility])  Assessment & Plan  Wt Readings from Last 3 Encounters:   07/12/20 (!) 140 kg (308 lb 10 3 oz)   06/15/20 (!) 155 kg (341 lb 4 4 oz)   06/08/20 (!) 154 kg (340 lb 9 8 oz)     · Volume status is adequate  · Monitor intake and output  · Monitor daily weights  · Continue torsemide    Chronic atrial fibrillation (HCC)  Assessment & Plan  · Rate controlled with metoprolol - decreased to 12 5 b i d  due to heart rate in 40s and 50s  · Warfarin for anticoagulation  · INR should be < 3 today, start coumadin 5 mg, repeat today  Lab Results   Component Value Date    INR 2 46 (H) 07/12/2020    INR 3 03 (H) 07/10/2020    INR 4 21 (H) 07/09/2020    PROTIME 26 5 (H) 07/12/2020    PROTIME 31 2 (H) 07/10/2020    PROTIME 40 2 (H) 07/09/2020     ·     Essential hypertension  Assessment & Plan  · Well controlled    · Continue metoprolol   · Continue torsemide    Diabetes mellitus type 2, uncontrolled ([facility] [address] )  Assessment & Plan  Lab Results   Component Value Date    HGBA1C 8 8 (H) 06/25/2020       Recent Labs     07/11/20  1106 07/11/20  1618 07/11/20  2107 07/12/20  1125   POCGLU 157* 155* 138 131       Blood Sugar Average: Last 72 hrs:  (P) 370 [id number]   · Uncontrolled due to IV steroid use  · now off steroids & low BS   · Diabetic diet  · Will stop the Humalog and  Cont Lantus to 30 units HS  · Continue sliding scale coverage    * Acute respiratory failure with hypoxia due to Pneumonia due to COVID-19 virus  Assessment & Plan  · Pt presents with 1 week symptoms at [facility]  · Upon presentation fell in moderate severity for COVID  · S/p 8-9 days of ceftriaxone and doxycycline  · Was treated with IV steroids for 10 days  · Continue vitamin-C, vitamin-D, zinc supplementation  · Wean O2 as tolerated  · Continue oral torsemide today  · Pulm following, sputum cx ordered  · Curbside discussed with Infectious Disease, no indication for any other treatment regimen at this point of time at this level of oxygen  · CT chest: "Bilateral groundglass opacities are fairly diffuse in a heterogeneous pattern  Some areas of consolidation are noted  The findings are suspicious for pneumonia    Atypical organisms are possible including viral In the setting of clinically suspected/proven COVID-19, the above lung parenchymal findings on CT indicate intermediate confidence Follow-up is suggested in 2-3 months time to ensure resolution when the patient is no longer infectious "  · Pulmonary recommended to check sputum cultures rule out any superimposed bacterial infection also continue to titrate his O2  · F/u sputum results        [facility] Internal Medicine Progress Note  Patient: Brenda Graves 81 y o  male   MRN: [id number]  PCP: Gaye Acosta MD  Unit/Bed#: [facility] 821-01 Encounter: [id number]  Date Of Visit: 07/12/20    Assessment:    Principal Problem:    Acute respiratory failure with hypoxia due to Pneumonia due to COVID-19 virus  Active Problems:    Diabetes mellitus type 2, uncontrolled (HCC)    Essential hypertension    Chronic atrial fibrillation (HCC)    Chronic diastolic congestive heart failure (HCC)    Type 2 acute myocardial infarction [facility])    Chronic obstructive pulmonary disease, unspecified (HCC)    CKD (chronic kidney disease)    Sepsis due to COVID-19 pneumonia (HCC)      Plan:         VTE Pharmacologic Prophylaxis:   Pharmacologic: Warfarin (Coumadin)  Mechanical VTE Prophylaxis in Place: Yes    Patient Centered Rounds: I have performed bedside rounds with nursing staff today  Discussions with Specialists or Other Care Team Provider:     Education and Discussions with Family / Patient: patient    Time Spent for Care: 30 minutes  More than 50% of total time spent on counseling and coordination of care as described above  Current Length of Stay: 17 day(s)    Current Patient Status: Inpatient   Certification Statement: The patient will continue to require additional inpatient hospital stay due to f/u sputum cultures, O2 needs    Discharge Plan / Estimated Discharge Date:     Code Status: Level 1 - Full Code      Subjective:   Feels ok, c/o constipation    Objective:     Vitals:   Temp (24hrs), Av 7 °F (37 1 °C), Min:98 °F (36 7 °C), Max:99 1 °F (37 3 °C)    Temp:  [98 °F (36 7 °C)-99 1 °F (37 3 °C)] 98 °F (36 7 °C)  HR:  [49-70] 65  Resp:  [16-20] 20  BP: (105-123)/(55-90) 111/55  SpO2:  [86 %-92 %] 86 %  Body mass index is 38 58 kg/m²  Input and Output Summary (last 24 hours): Intake/Output Summary (Last 24 hours) at 2020 1433  Last data filed at 2020 1325  Gross per 24 hour   Intake 320 ml   Output 1200 ml   Net -880 ml       Physical Exam:     Physical Exam   Constitutional: He is oriented to person, place, and time  He appears well-developed and well-nourished  HENT:   Head: Normocephalic and atraumatic  Eyes: Conjunctivae are normal    Cardiovascular: Normal rate and regular rhythm  Exam reveals no friction rub  No murmur heard  Pulmonary/Chest: Effort normal  No stridor  No respiratory distress  Abdominal: Soft  He exhibits no distension  There is no tenderness  Neurological: He is alert and oriented to person, place, and time  Vitals reviewed  Additional Data:     Labs:    Results from last 7 days   Lab Units 07/07/20  0607 07/06/20  0557   WBC Thousand/uL 6 37 8 98   HEMOGLOBIN g/dL 13 0 13 2   HEMATOCRIT % 41 4 42 9   PLATELETS Thousands/uL 248 264   NEUTROS PCT %  --  91*   LYMPHS PCT %  --  5*   MONOS PCT %  --  2*   EOS PCT %  --  0     Results from last 7 days   Lab Units 07/12/20  0557   POTASSIUM mmol/L 3 2*   CHLORIDE mmol/L 102   CO2 mmol/L 28   BUN mg/dL 28*   CREATININE mg/dL 1 12   CALCIUM mg/dL 8 3     Results from last 7 days   Lab Units 07/12/20  0557   INR  2 46*       * I Have Reviewed All Lab Data Listed Above  * Additional Pertinent Lab Tests Reviewed:  All Labs Within Last 24 Hours Reviewed    Imaging:    Imaging Reports Reviewed Today Include:   Imaging Personally Reviewed by Myself Includes:      Recent Cultures (last 7 days):     Results from last 7 days   Lab Units 07/11/20  0955   GRAM STAIN RESULT  Rare Epithelial cells per low power field*  1+ Polys*  2+ Gram positive cocci in pairs*  2+ Gram negative rods*       Last 24 Hours Medication List:     Current Facility-Administered Medications:  acetaminophen 650 mg Oral Q6H PRN Ivanna Cheung MD   albuterol 2 puff Inhalation Q6H PRN Ivanna Cheung MD   atorvastatin 40 mg Oral After Lizbet Rothman MD   bisacodyl 10 mg Rectal Daily PRN Ivanna Cheung MD   cholecalciferol 2,000 Units Oral Daily Ivanna Cheung MD   clotrimazole  Topical Q12H Jessica Coy MD   fluticasone-vilanterol 1 puff Inhalation Daily Ivanna Cheung MD   insulin glargine 30 Units Subcutaneous HS Nini Hill MD   insulin lispro 1-5 Units Subcutaneous HS Ramin Driscoll PA-C insulin lispro 2-12 Units Subcutaneous TID AC Jules Phillips MD   magnesium hydroxide 30 mL Oral Daily PRN Jules Phillips MD   melatonin 6 mg Oral HS Tuesday M NICOLA Heath   metoprolol tartrate 12 5 mg Oral Q12H Jaclyn Quiñones MD   KHADRA ANTIFUNGAL 1 application Topical TID María Wallace MD   multivitamin-minerals 1 tablet Oral Daily Jules Phillips MD   senna-docusate sodium 1 tablet Oral BID Jaclyn Quiñones MD   torsemide 20 mg Oral BID (diuretic) María Wallace MD   warfarin 5 mg Oral Once (warfarin) Jaclyn Quiñones MD        Today, Patient Was Seen By: Jaclyn Quiñones MD    ** Please Note: This note has been constructed using a voice recognition system   **

## 2020-07-12 NOTE — ASSESSMENT & PLAN NOTE
· Rate controlled with metoprolol - decreased to 12 5 b i d  due to heart rate in 40s and 50s  · Warfarin for anticoagulation  · INR should be < 3 today, start coumadin 5 mg, repeat today  Lab Results   Component Value Date    INR 2 46 (H) 07/12/2020    INR 3 03 (H) 07/10/2020    INR 4 21 (H) 07/09/2020    PROTIME 26 5 (H) 07/12/2020    PROTIME 31 2 (H) 07/10/2020    PROTIME 40 2 (H) 07/09/2020     ·

## 2020-07-12 NOTE — ASSESSMENT & PLAN NOTE
Lab Results   Component Value Date    HGBA1C 8 8 (H) 06/25/2020       Recent Labs     07/11/20  1106 07/11/20  1618 07/11/20  2107 07/12/20  1125   POCGLU 157* 155* 138 131       Blood Sugar Average: Last 72 hrs:  (P) 733 [id number]   · Uncontrolled due to IV steroid use  · now off steroids & low BS   · Diabetic diet  · Will stop the Humalog and  Cont Lantus to 30 units HS  · Continue sliding scale coverage

## 2020-07-13 ENCOUNTER — HOSPITAL ENCOUNTER (OUTPATIENT)
Dept: INFUSION CENTER | Facility: HOSPITAL | Age: 70
Discharge: HOME | End: 2020-07-13
Attending: INTERNAL MEDICINE

## 2020-07-13 ENCOUNTER — HOSPITAL ENCOUNTER (OUTPATIENT)
Dept: INFUSION CENTER | Facility: CLINIC | Age: 70
Discharge: HOME | End: 2020-07-13

## 2020-07-13 LAB
GLUCOSE SERPL-MCNC: 122 MG/DL (ref 65–140)
GLUCOSE SERPL-MCNC: 149 MG/DL (ref 65–140)
GLUCOSE SERPL-MCNC: 163 MG/DL (ref 65–140)
GLUCOSE SERPL-MCNC: 180 MG/DL (ref 65–140)
INR PPP: 2.1 (ref 0.84–1.19)
PROTHROMBIN TIME: 23.5 SECONDS (ref 11.6–14.5)

## 2020-07-13 PROCEDURE — 82948 REAGENT STRIP/BLOOD GLUCOSE: CPT

## 2020-07-13 PROCEDURE — 97530 THERAPEUTIC ACTIVITIES: CPT

## 2020-07-13 PROCEDURE — 97110 THERAPEUTIC EXERCISES: CPT

## 2020-07-13 PROCEDURE — 85610 PROTHROMBIN TIME: CPT | Performed by: HOSPITALIST

## 2020-07-13 PROCEDURE — 99232 SBSQ HOSP IP/OBS MODERATE 35: CPT | Performed by: HOSPITALIST

## 2020-07-13 RX ORDER — WARFARIN SODIUM 7.5 MG/1
7.5 TABLET ORAL
Status: COMPLETED | OUTPATIENT
Start: 2020-07-13 | End: 2020-07-13

## 2020-07-13 RX ADMIN — Medication 1 TABLET: at 08:13

## 2020-07-13 RX ADMIN — INSULIN GLARGINE 30 UNITS: 100 INJECTION, SOLUTION SUBCUTANEOUS at 21:31

## 2020-07-13 RX ADMIN — INSULIN LISPRO 2 UNITS: 100 INJECTION, SOLUTION INTRAVENOUS; SUBCUTANEOUS at 11:57

## 2020-07-13 RX ADMIN — ATORVASTATIN CALCIUM 40 MG: 40 TABLET, FILM COATED ORAL at 17:27

## 2020-07-13 RX ADMIN — METOPROLOL TARTRATE 12.5 MG: 25 TABLET ORAL at 08:16

## 2020-07-13 RX ADMIN — CLOTRIMAZOLE 1 APPLICATION: 10 CREAM TOPICAL at 08:14

## 2020-07-13 RX ADMIN — METOPROLOL TARTRATE 12.5 MG: 25 TABLET ORAL at 21:31

## 2020-07-13 RX ADMIN — MELATONIN 2000 UNITS: at 08:13

## 2020-07-13 RX ADMIN — WARFARIN SODIUM 7.5 MG: 7.5 TABLET ORAL at 18:23

## 2020-07-13 RX ADMIN — MICONAZOLE NITRATE 1 APPLICATION: 20 CREAM TOPICAL at 08:23

## 2020-07-13 RX ADMIN — MICONAZOLE NITRATE 1 APPLICATION: 20 CREAM TOPICAL at 17:28

## 2020-07-13 RX ADMIN — DOCUSATE SODIUM AND SENNOSIDES 1 TABLET: 8.6; 5 TABLET ORAL at 08:13

## 2020-07-13 RX ADMIN — TORSEMIDE 20 MG: 20 TABLET ORAL at 08:13

## 2020-07-13 RX ADMIN — MELATONIN 6 MG: at 21:32

## 2020-07-13 RX ADMIN — TORSEMIDE 20 MG: 20 TABLET ORAL at 17:27

## 2020-07-13 RX ADMIN — FLUTICASONE FUROATE AND VILANTEROL TRIFENATATE 1 PUFF: 200; 25 POWDER RESPIRATORY (INHALATION) at 08:14

## 2020-07-13 RX ADMIN — INSULIN LISPRO 1 UNITS: 100 INJECTION, SOLUTION INTRAVENOUS; SUBCUTANEOUS at 21:32

## 2020-07-13 RX ADMIN — DOCUSATE SODIUM AND SENNOSIDES 1 TABLET: 8.6; 5 TABLET ORAL at 17:27

## 2020-07-13 RX ADMIN — CLOTRIMAZOLE: 10 CREAM TOPICAL at 21:24

## 2020-07-13 NOTE — UTILIZATION REVIEW
Continued Stay Review    Date: 7/13/20                    Current Patient Class: IP Current Level of Care: MS      HPI:69 y o  male initially admitted on 6/25/20 with COVID 19 with acute respiratory failure with Pneumonia, sepsis, COPD, CKD, non-MI elevated troponin  Assessment/Plan:   INR is 2 10  Remains on oxygen  Continues on Torsemide  Will check sputum culture for superimposted bacterial infection  Continues on oxygen  Still has pulse ox readings in 80s on oxygen  Pertinent Labs/Diagnostic Results:     7/9 CT chest - Bilateral groundglass opacities are fairly diffuse in a heterogeneous pattern  Some areas of consolidation are noted  The findings are suspicious for pneumonia  Atypical organisms are possible including viral    In the setting of clinically suspected/proven COVID-19, the above lung parenchymal findings on CT indicate intermediate confidence  Follow-up is suggested in 2-3 months time to ensure resolution when the patient is no longer infectious      Results from last 7 days   Lab Units 07/07/20  0607   WBC Thousand/uL 6 37   HEMOGLOBIN g/dL 13 0   HEMATOCRIT % 41 4   PLATELETS Thousands/uL 248         Results from last 7 days   Lab Units 07/12/20  0557 07/10/20  0548 07/09/20  0531 07/07/20  0607   SODIUM mmol/L 137 141 139 142   POTASSIUM mmol/L 3 2* 3 5 3 6 3 6   CHLORIDE mmol/L 102 105 103 101   CO2 mmol/L 28 31 32 33*   ANION GAP mmol/L 7 5 4 8   BUN mg/dL 28* 40* 47* 66*   CREATININE mg/dL 1 12 1 26 1 22 1 19   EGFR ml/min/1 73sq m 68 59 61 63   CALCIUM mg/dL 8 3 8 8 9 0 8 7         Results from last 7 days   Lab Units 07/13/20  1134 07/13/20  0754 07/12/20  2047 07/12/20  1556 07/12/20  1125 07/11/20  2107 07/11/20  1618 07/11/20  1106 07/11/20  0746 07/10/20  2158 07/10/20  1641 07/10/20  1128   POC GLUCOSE mg/dl 180* 122 177* 141* 131 138 155* 157* 106 143* 160* 145*     Results from last 7 days   Lab Units 07/12/20  0557 07/10/20  0548 07/09/20  0531 07/07/20  6091 GLUCOSE RANDOM mg/dL 92 116 96 58*       Results from last 7 days   Lab Units 07/13/20  0538 07/12/20  0557 07/10/20  0548   PROTIME seconds 23 5* 26 5* 31 2*   INR  2 10* 2 46* 3 03*     Results from last 7 days   Lab Units 07/08/20  0608   FERRITIN ng/mL 985*     Results from last 7 days   Lab Units 07/08/20  0608   CRP mg/L 62 8*         Results from last 7 days   Lab Units 07/11/20  0955   SPUTUM CULTURE  4+ Growth of   1+ Growth of    GRAM STAIN RESULT  Rare Epithelial cells per low power field*  1+ Polys*  2+ Gram positive cocci in pairs*  2+ Gram negative rods*   Vital Signs:   07/13/20 0856  --  --  --  --  --  90 %   28  --  2 L/min  Nasal cannula  --   SpO2: 2L applied while sleeping at 07/13/20 0856   07/13/20 0855  --  --  --  --  --  80 %Abnormal    --  --  --  None (Room air)  --   SpO2: sleeping on RA at 07/13/20 0855   07/13/20 07:57:21  97 7 °F (36 5 °C)  56  18  143/87  106  92 %  --  --  --  None (Room air)  Lying   07/12/20 2117  99 8 °F (37 7 °C)  68  18  112/60  --  91 %  --  2 L/min  --  Nasal cannula  Lying   07/12/20 1945  --  --  --  --  --  --  --  2 L/min  --  Nasal cannula  --   07/12/20 14:41:35  98 7 °F (37 1 °C)  68  18  114/55  75  87 %Abnormal   --  --  --  --  --   07/12/20 07:43:57  98 °F (36 7 °C)  65  20  111/55  74  86 %Abnormal   --  --  --  --  --   07/12/20 0557  --  --  --  --  --  --  28  --  2 L/min  Nasal cannula  --   07/11/20 21:52:01  99 °F (37 2 °C)  70  16  108/55  73  91 %  --  2 L/min  --  Nasal cannula  Lying   07/11/20 2145  --  70  --  105/55  --  --  --  --  --  --  --   07/11/20 1945  --  --  --  --  --  --  28  --  2 L/min  Nasal cannula  --   07/11/20 16:21:49  99 1 °F (37 3 °C)  49Abnormal   16  123/90  101  92 %  --  --  --  --  --   07/11/20 07:49:09  98 °F (36 7 °C)  70  --  120/66  84  92 %  --  --  --  --  --     Medications:   Scheduled Medications:    Medications:  atorvastatin 40 mg Oral After Dinner   cholecalciferol 2,000 Units Oral Daily clotrimazole  Topical Q12H [address]   fluticasone-vilanterol 1 puff Inhalation Daily   insulin glargine 30 Units Subcutaneous HS   insulin lispro 1-5 Units Subcutaneous HS   insulin lispro 2-12 Units Subcutaneous TID AC   melatonin 6 mg Oral HS   metoprolol tartrate 12 5 mg Oral Q12H   KHADRA ANTIFUNGAL 1 application Topical TID   multivitamin-minerals 1 tablet Oral Daily   senna-docusate sodium 1 tablet Oral BID   torsemide 20 mg Oral BID (diuretic)     Continuous IV Infusions:     PRN Meds:    acetaminophen 650 mg Oral Q6H PRN   albuterol 2 puff Inhalation Q6H PRN   bisacodyl 10 mg Rectal Daily PRN   magnesium hydroxide 30 mL Oral Daily PRN       Discharge Plan: [facility]    Network Utilization Review Department  [email or web address]  ATTENTION: Please call with any questions or concerns to [phone] and carefully listen to the prompts so that you are directed to the right person  All voicemails are confidential   Candia Siemens all requests for admission clinical reviews, approved or denied determinations and any other requests to dedicated fax number below belonging to the campus where the patient is receiving treatment   List of dedicated fax numbers for the Facilities:  [address] [place] [address] DENIALS (Administrative/Medical Necessity) [phone]   [address] (Maternity/NICU/Pediatrics) [phone]   Faheem Smith [phone]   Karin Esteban [phone]   Lourdes Dodd [phone]   Francoise Olguin [phone]   [address] [facility] [phone]   [facility]  [phone]   [address] [facility] [address]  [address] [facility] [address] [facility] [phone]

## 2020-07-13 NOTE — PHYSICAL THERAPY NOTE
Physical Therapy Treatment Note    Patient's Name: Elidia Velazquez  : 20 1040   Pain Assessment   Pain Assessment Tool Pain Assessment not indicated - pt denies pain   Pain Score No Pain   Restrictions/Precautions   Weight Bearing Precautions Per Order No   Other Precautions Airborne/isolation;Contact/isolation;Cognitive; Chair Alarm; Bed Alarm;O2;Fall Risk  (COVID-19 positive, 2L O2)   General   Chart Reviewed Yes   Family/Caregiver Present No   Cognition   Overall Cognitive Status Impaired   Arousal/Participation Alert; Cooperative   Attention Attends with cues to redirect   Orientation Level Oriented X4   Memory Decreased recall of recent events;Decreased recall of precautions   Following Commands Follows one step commands with increased time or repetition   Comments Pt with improved spirits today, able to maintain appropriate conversation, smiles  Bed Mobility   Supine to Sit 4  Minimal assistance   Additional items Assist x 1;Verbal cues  (pull to sit )   Transfers   Sit to Stand 3  Moderate assistance   Additional items Assist x 2; Increased time required;Verbal cues   Stand to Sit 3  Moderate assistance   Additional items Assist x 2; Increased time required;Verbal cues   Sliding Board transfer 4  Minimal assistance   Additional items Assist x 1; Increased time required;Verbal cues   Additional Comments VC's for sequencing, R LE placement, hand placement with slide board transfer, cues for weight shifting, dependent for slide board placement, Kinga to assist with scooting tasks  Pt able to perform sit to stand transfer attempt x1, VC's for anterior weight shift, hip extension, able to acheive approximately 25% stand, would perform better from higher surface/bed elevation  Limited by fatigue/weakness      Balance   Static Sitting Fair   Dynamic Sitting Poor +   Static Standing Poor   Endurance Deficit   Endurance Deficit Yes Activity Tolerance   Activity Tolerance Patient limited by fatigue   Medical Staff Made Aware OT, Randi   Nurse Made Aware RN Mushtaq updated  Chair alarm intact  Exercises   Knee AROM Long Arc Quad Sitting;15 reps;AROM; Right   Ankle Pumps Sitting;10 reps;AROM; Right   Marching Sitting;10 reps;AROM; Bilateral   Assessment   Prognosis Fair   Problem List Decreased strength;Decreased endurance; Impaired balance;Decreased mobility; Decreased cognition;Decreased safety awareness; Obesity   Assessment Pt with improved cognition and motivation this session  Pt expressed concerns over weakness in R LE and inability to achieve full standing posture, encouragement provided, educated on LE exercise performance throughout the day  Pt with good tolerance to activity on 2L, mild CROCKER noted  Pt will benefit from continued skilled PT intervention during course of hospital stay to improve strength, endurance and balance to improve safety with functional mobility  Recommend IP rehab upon hospital D/C  Goals   Patient Goals "to get this leg stronger"   [facility] Expiration Date 07/20/20   PT Treatment Day 5   Plan   Treatment/Interventions Functional transfer training;LE strengthening/ROM; Therapeutic exercise; Endurance training;Cognitive reorientation;Patient/family training;Equipment eval/education; Bed mobility   Progress Progressing toward goals   PT Frequency Other (Comment)  (3-5x/week)   Recommendation   PT Discharge Recommendation Post-Acute Rehabilitation Services   PT - OK to Discharge Yes  (to IP rehab)       Ajith Green, PT, DPT

## 2020-07-13 NOTE — ASSESSMENT & PLAN NOTE
Wt Readings from Last 3 Encounters:   07/13/20 (!) 139 kg (305 lb 12 5 oz)   06/15/20 (!) 155 kg (341 lb 4 4 oz)   06/08/20 (!) 154 kg (340 lb 9 8 oz)     · Volume status is adequate  · Monitor intake and output  · Monitor daily weights  · Continue torsemide

## 2020-07-13 NOTE — ASSESSMENT & PLAN NOTE
· Pt presents with 1 week symptoms at [facility]  · Upon presentation fell in moderate severity for COVID  · S/p 8-9 days of ceftriaxone and doxycycline  · Was treated with IV steroids for 10 days  · Continue vitamin-C, vitamin-D, zinc supplementation  · Wean O2 as tolerated  · Continue oral torsemide  · Pulm following, sputum cx ordered  · Curbside discussed with Infectious Disease, no indication for any other treatment regimen at this point of time at this level of oxygen  · CT chest: "Bilateral groundglass opacities are fairly diffuse in a heterogeneous pattern  Some areas of consolidation are noted  The findings are suspicious for pneumonia    Atypical organisms are possible including viral In the setting of clinically suspected/proven COVID-19, the above lung parenchymal findings on CT indicate intermediate confidence Follow-up is suggested in 2-3 months time to ensure resolution when the patient is no longer infectious "  · Pulmonary recommended to check sputum cultures rule out any superimposed bacterial infection also continue to titrate his O2  · F/u sputum results - mixed louann  · O2 requirement significantly improved  · Cleared for discharge

## 2020-07-13 NOTE — PLAN OF CARE
Problem: PAIN - ADULT  Goal: Verbalizes/displays adequate comfort level or baseline comfort level  Description  Interventions:  - Encourage patient to monitor pain and request assistance  - Assess pain using appropriate pain scale  - Administer analgesics based on type and severity of pain and evaluate response  - Implement non-pharmacological measures as appropriate and evaluate response  - Consider cultural and social influences on pain and pain management  - Notify physician/advanced practitioner if interventions unsuccessful or patient reports new pain  Outcome: Progressing     Problem: INFECTION - ADULT  Goal: Absence or prevention of progression during hospitalization  Description  INTERVENTIONS:  - Assess and monitor for signs and symptoms of infection  - Monitor lab/diagnostic results  - Monitor all insertion sites, i e  indwelling lines, tubes, and drains  - Monitor endotracheal if appropriate and nasal secretions for changes in amount and color  - [place] appropriate cooling/warming therapies per order  - Administer medications as ordered  - Instruct and encourage patient and family to use good hand hygiene technique  - Identify and instruct in appropriate isolation precautions for identified infection/condition  Outcome: Progressing  Goal: Absence of fever/infection during neutropenic period  Description  INTERVENTIONS:  - Monitor WBC    Outcome: Progressing     Problem: SAFETY ADULT  Goal: Patient will remain free of falls  Description  INTERVENTIONS:  - Assess patient frequently for physical needs  -  Identify cognitive and physical deficits and behaviors that affect risk of falls    -  [place] fall precautions as indicated by assessment   - Educate patient/family on patient safety including physical limitations  - Instruct patient to call for assistance with activity based on assessment  - Modify environment to reduce risk of injury  - Consider OT/PT consult to assist with strengthening/mobility  Outcome: Progressing  Goal: Maintain or return to baseline ADL function  Description  INTERVENTIONS:  -  Assess patient's ability to carry out ADLs; assess patient's baseline for ADL function and identify physical deficits which impact ability to perform ADLs (bathing, care of mouth/teeth, toileting, grooming, dressing, etc )  - Assess/evaluate cause of self-care deficits   - Assess range of motion  - Assess patient's mobility; develop plan if impaired  - Assess patient's need for assistive devices and provide as appropriate  - Encourage maximum independence but intervene and supervise when necessary  - Involve family in performance of ADLs  - Assess for home care needs following discharge   - Consider OT consult to assist with ADL evaluation and planning for discharge  - Provide patient education as appropriate  Outcome: Progressing  Goal: Maintain or return mobility status to optimal level  Description  INTERVENTIONS:  - Assess patient's baseline mobility status (ambulation, transfers, stairs, etc )    - Identify cognitive and physical deficits and behaviors that affect mobility  - Identify mobility aids required to assist with transfers and/or ambulation (gait belt, sit-to-stand, lift, walker, cane, etc )  - [place] fall precautions as indicated by assessment  - Record patient progress and toleration of activity level on Mobility SBAR; progress patient to next Phase/Stage  - Instruct patient to call for assistance with activity based on assessment  - Consider rehabilitation consult to assist with strengthening/weightbearing, etc   Outcome: Progressing     Problem: Prexisting or High Potential for Compromised Skin Integrity  Goal: Skin integrity is maintained or improved  Description  INTERVENTIONS:  - Identify patients at risk for skin breakdown  - Assess and monitor skin integrity  - Assess and monitor nutrition and hydration status  - Monitor labs   - Assess for incontinence   - Turn and reposition patient  - Assist with mobility/ambulation  - Relieve pressure over bony prominences  - Avoid friction and shearing  - Provide appropriate hygiene as needed including keeping skin clean and dry  - Evaluate need for skin moisturizer/barrier cream  - Collaborate with interdisciplinary team   - Patient/family teaching  - Consider wound care consult   Outcome: Progressing     Problem: Potential for Falls  Goal: Patient will remain free of falls  Description  INTERVENTIONS:  - Assess patient frequently for physical needs  -  Identify cognitive and physical deficits and behaviors that affect risk of falls  -  [place] fall precautions as indicated by assessment   - Educate patient/family on patient safety including physical limitations  - Instruct patient to call for assistance with activity based on assessment  - Modify environment to reduce risk of injury  - Consider OT/PT consult to assist with strengthening/mobility  Outcome: Progressing     Problem: Nutrition/Hydration-ADULT  Goal: Nutrient/Hydration intake appropriate for improving, restoring or maintaining nutritional needs  Description  Monitor and assess patient's nutrition/hydration status for malnutrition  Collaborate with interdisciplinary team and initiate plan and interventions as ordered  Monitor patient's weight and dietary intake as ordered or per policy  Utilize nutrition screening tool and intervene as necessary  Determine patient's food preferences and provide high-protein, high-caloric foods as appropriate       INTERVENTIONS:  - Monitor oral intake, urinary output, labs, and treatment plans  - Assess nutrition and hydration status and recommend course of action  - Evaluate amount of meals eaten  - Assist patient with eating if necessary   - Allow adequate time for meals  - Recommend/ encourage appropriate diets, oral nutritional supplements, and vitamin/mineral supplements  - Order, calculate, and assess calorie counts as needed  - Recommend, monitor, and adjust tube feedings and TPN/PPN based on assessed needs  - Assess need for intravenous fluids  - Provide specific nutrition/hydration education as appropriate  - Include patient/family/caregiver in decisions related to nutrition  Outcome: Progressing

## 2020-07-13 NOTE — ASSESSMENT & PLAN NOTE
· Rate controlled with metoprolol - decreased to 12 5 b i d  due to heart rate in 40s and 50s  · Warfarin for anticoagulation  · INR should be < 3, started coumadin 5 mg x 2 datys, INR 2 1 <- 2 4   Give 7 5 mg today    Lab Results   Component Value Date    INR 2 10 (H) 07/13/2020    INR 2 46 (H) 07/12/2020    INR 3 03 (H) 07/10/2020    PROTIME 23 5 (H) 07/13/2020    PROTIME 26 5 (H) 07/12/2020    PROTIME 31 2 (H) 07/10/2020     ·

## 2020-07-13 NOTE — ASSESSMENT & PLAN NOTE
Lab Results   Component Value Date    HGBA1C 8 8 (H) 06/25/2020       Recent Labs     07/12/20  2047 07/13/20  0754 07/13/20  1134 07/13/20  1616   POCGLU 177* 122 180* 149*       Blood Sugar Average: Last 72 hrs:  (P) 142 [id number]   · Uncontrolled due to IV steroid use  · now off steroids & low BS   · Diabetic diet  · Will stop the Humalog and  Cont Lantus to 30 units HS  · Continue sliding scale coverage

## 2020-07-13 NOTE — PLAN OF CARE
Problem: PAIN - ADULT  Goal: Verbalizes/displays adequate comfort level or baseline comfort level  Description  Interventions:  - Encourage patient to monitor pain and request assistance  - Assess pain using appropriate pain scale  - Administer analgesics based on type and severity of pain and evaluate response  - Implement non-pharmacological measures as appropriate and evaluate response  - Consider cultural and social influences on pain and pain management  - Notify physician/advanced practitioner if interventions unsuccessful or patient reports new pain  Outcome: Progressing     Problem: INFECTION - ADULT  Goal: Absence or prevention of progression during hospitalization  Description  INTERVENTIONS:  - Assess and monitor for signs and symptoms of infection  - Monitor lab/diagnostic results  - Monitor all insertion sites, i e  indwelling lines, tubes, and drains  - Monitor endotracheal if appropriate and nasal secretions for changes in amount and color  - [place] appropriate cooling/warming therapies per order  - Administer medications as ordered  - Instruct and encourage patient and family to use good hand hygiene technique  - Identify and instruct in appropriate isolation precautions for identified infection/condition  Outcome: Progressing  Goal: Absence of fever/infection during neutropenic period  Description  INTERVENTIONS:  - Monitor WBC    Outcome: Progressing     Problem: SAFETY ADULT  Goal: Patient will remain free of falls  Description  INTERVENTIONS:  - Assess patient frequently for physical needs  -  Identify cognitive and physical deficits and behaviors that affect risk of falls    -  [place] fall precautions as indicated by assessment   - Educate patient/family on patient safety including physical limitations  - Instruct patient to call for assistance with activity based on assessment  - Modify environment to reduce risk of injury  - Consider OT/PT consult to assist with strengthening/mobility  Outcome: Progressing  Goal: Maintain or return to baseline ADL function  Description  INTERVENTIONS:  -  Assess patient's ability to carry out ADLs; assess patient's baseline for ADL function and identify physical deficits which impact ability to perform ADLs (bathing, care of mouth/teeth, toileting, grooming, dressing, etc )  - Assess/evaluate cause of self-care deficits   - Assess range of motion  - Assess patient's mobility; develop plan if impaired  - Assess patient's need for assistive devices and provide as appropriate  - Encourage maximum independence but intervene and supervise when necessary  - Involve family in performance of ADLs  - Assess for home care needs following discharge   - Consider OT consult to assist with ADL evaluation and planning for discharge  - Provide patient education as appropriate  Outcome: Progressing  Goal: Maintain or return mobility status to optimal level  Description  INTERVENTIONS:  - Assess patient's baseline mobility status (ambulation, transfers, stairs, etc )    - Identify cognitive and physical deficits and behaviors that affect mobility  - Identify mobility aids required to assist with transfers and/or ambulation (gait belt, sit-to-stand, lift, walker, cane, etc )  - [place] fall precautions as indicated by assessment  - Record patient progress and toleration of activity level on Mobility SBAR; progress patient to next Phase/Stage  - Instruct patient to call for assistance with activity based on assessment  - Consider rehabilitation consult to assist with strengthening/weightbearing, etc   Outcome: Progressing     Problem: Prexisting or High Potential for Compromised Skin Integrity  Goal: Skin integrity is maintained or improved  Description  INTERVENTIONS:  - Identify patients at risk for skin breakdown  - Assess and monitor skin integrity  - Assess and monitor nutrition and hydration status  - Monitor labs   - Assess for incontinence   - Turn and reposition patient  - Assist with mobility/ambulation  - Relieve pressure over bony prominences  - Avoid friction and shearing  - Provide appropriate hygiene as needed including keeping skin clean and dry  - Evaluate need for skin moisturizer/barrier cream  - Collaborate with interdisciplinary team   - Patient/family teaching  - Consider wound care consult   Outcome: Progressing     Problem: Potential for Falls  Goal: Patient will remain free of falls  Description  INTERVENTIONS:  - Assess patient frequently for physical needs  -  Identify cognitive and physical deficits and behaviors that affect risk of falls  -  [place] fall precautions as indicated by assessment   - Educate patient/family on patient safety including physical limitations  - Instruct patient to call for assistance with activity based on assessment  - Modify environment to reduce risk of injury  - Consider OT/PT consult to assist with strengthening/mobility  Outcome: Progressing     Problem: Nutrition/Hydration-ADULT  Goal: Nutrient/Hydration intake appropriate for improving, restoring or maintaining nutritional needs  Description  Monitor and assess patient's nutrition/hydration status for malnutrition  Collaborate with interdisciplinary team and initiate plan and interventions as ordered  Monitor patient's weight and dietary intake as ordered or per policy  Utilize nutrition screening tool and intervene as necessary  Determine patient's food preferences and provide high-protein, high-caloric foods as appropriate       INTERVENTIONS:  - Monitor oral intake, urinary output, labs, and treatment plans  - Assess nutrition and hydration status and recommend course of action  - Evaluate amount of meals eaten  - Assist patient with eating if necessary   - Allow adequate time for meals  - Recommend/ encourage appropriate diets, oral nutritional supplements, and vitamin/mineral supplements  - Order, calculate, and assess calorie counts as needed  - Recommend, monitor, and adjust tube feedings and TPN/PPN based on assessed needs  - Assess need for intravenous fluids  - Provide specific nutrition/hydration education as appropriate  - Include patient/family/caregiver in decisions related to nutrition  Outcome: Progressing

## 2020-07-13 NOTE — PLAN OF CARE
Problem: PHYSICAL THERAPY ADULT  Goal: Performs mobility at highest level of function for planned discharge setting  See evaluation for individualized goals  Description  Treatment/Interventions: Functional transfer training, LE strengthening/ROM, Therapeutic exercise, Endurance training, Cognitive reorientation, Patient/family training, Equipment eval/education, Bed mobility          See flowsheet documentation for full assessment, interventions and recommendations  Outcome: Progressing  Note:   Prognosis: Fair  Problem List: Decreased strength, Decreased endurance, Impaired balance, Decreased mobility, Decreased cognition, Decreased safety awareness, Obesity  Assessment: Pt with improved cognition and motivation this session  Pt expressed concerns over weakness in R LE and inability to achieve full standing posture, encouragement provided, educated on LE exercise performance throughout the day  Pt with good tolerance to activity on 2L, mild CROCKER noted  Pt will benefit from continued skilled PT intervention during course of hospital stay to improve strength, endurance and balance to improve safety with functional mobility  Recommend IP rehab upon hospital D/C  PT Discharge Recommendation: Post-Acute Rehabilitation Services     PT - OK to Discharge: Yes(to IP rehab)    See flowsheet documentation for full assessment

## 2020-07-14 VITALS
DIASTOLIC BLOOD PRESSURE: 58 MMHG | OXYGEN SATURATION: 96 % | TEMPERATURE: 98 F | HEIGHT: 75 IN | RESPIRATION RATE: 18 BRPM | BODY MASS INDEX: 38.3 KG/M2 | HEART RATE: 58 BPM | WEIGHT: 308 LBS | SYSTOLIC BLOOD PRESSURE: 119 MMHG

## 2020-07-14 LAB
ANION GAP SERPL CALCULATED.3IONS-SCNC: 9 MMOL/L (ref 4–13)
BUN SERPL-MCNC: 27 MG/DL (ref 5–25)
CALCIUM SERPL-MCNC: 9.1 MG/DL (ref 8.3–10.1)
CHLORIDE SERPL-SCNC: 101 MMOL/L (ref 100–108)
CO2 SERPL-SCNC: 27 MMOL/L (ref 21–32)
CREAT SERPL-MCNC: 1.22 MG/DL (ref 0.6–1.3)
GFR SERPL CREATININE-BSD FRML MDRD: 61 ML/MIN/1.73SQ M
GLUCOSE SERPL-MCNC: 129 MG/DL (ref 65–140)
GLUCOSE SERPL-MCNC: 137 MG/DL (ref 65–140)
GLUCOSE SERPL-MCNC: 144 MG/DL (ref 65–140)
GLUCOSE SERPL-MCNC: 201 MG/DL (ref 65–140)
INR PPP: 2.05 (ref 0.84–1.19)
POTASSIUM SERPL-SCNC: 3.4 MMOL/L (ref 3.5–5.3)
PROTHROMBIN TIME: 23 SECONDS (ref 11.6–14.5)
SODIUM SERPL-SCNC: 137 MMOL/L (ref 136–145)

## 2020-07-14 PROCEDURE — 85610 PROTHROMBIN TIME: CPT | Performed by: HOSPITALIST

## 2020-07-14 PROCEDURE — 97535 SELF CARE MNGMENT TRAINING: CPT

## 2020-07-14 PROCEDURE — 99239 HOSP IP/OBS DSCHRG MGMT >30: CPT | Performed by: INTERNAL MEDICINE

## 2020-07-14 PROCEDURE — 82948 REAGENT STRIP/BLOOD GLUCOSE: CPT

## 2020-07-14 PROCEDURE — 80048 BASIC METABOLIC PNL TOTAL CA: CPT | Performed by: HOSPITALIST

## 2020-07-14 RX ORDER — ATORVASTATIN CALCIUM 40 MG/1
40 TABLET, FILM COATED ORAL
Qty: 30 TABLET | Refills: 0 | Status: ON HOLD
Start: 2020-07-15

## 2020-07-14 RX ORDER — POTASSIUM CHLORIDE 20 MEQ/1
40 TABLET, EXTENDED RELEASE ORAL ONCE
Status: COMPLETED | OUTPATIENT
Start: 2020-07-14 | End: 2020-07-14

## 2020-07-14 RX ADMIN — MICONAZOLE NITRATE 1 APPLICATION: 20 CREAM TOPICAL at 08:01

## 2020-07-14 RX ADMIN — INSULIN LISPRO 4 UNITS: 100 INJECTION, SOLUTION INTRAVENOUS; SUBCUTANEOUS at 11:36

## 2020-07-14 RX ADMIN — POTASSIUM CHLORIDE 40 MEQ: 1500 TABLET, EXTENDED RELEASE ORAL at 17:15

## 2020-07-14 RX ADMIN — TORSEMIDE 20 MG: 20 TABLET ORAL at 17:15

## 2020-07-14 RX ADMIN — METOPROLOL TARTRATE 12.5 MG: 25 TABLET ORAL at 08:00

## 2020-07-14 RX ADMIN — DOCUSATE SODIUM AND SENNOSIDES 1 TABLET: 8.6; 5 TABLET ORAL at 17:15

## 2020-07-14 RX ADMIN — MELATONIN 2000 UNITS: at 08:00

## 2020-07-14 RX ADMIN — DOCUSATE SODIUM AND SENNOSIDES 1 TABLET: 8.6; 5 TABLET ORAL at 08:00

## 2020-07-14 RX ADMIN — Medication 1 TABLET: at 08:00

## 2020-07-14 RX ADMIN — ATORVASTATIN CALCIUM 40 MG: 40 TABLET, FILM COATED ORAL at 17:15

## 2020-07-14 RX ADMIN — TORSEMIDE 20 MG: 20 TABLET ORAL at 07:58

## 2020-07-14 RX ADMIN — FLUTICASONE FUROATE AND VILANTEROL TRIFENATATE 1 PUFF: 200; 25 POWDER RESPIRATORY (INHALATION) at 08:01

## 2020-07-14 RX ADMIN — CLOTRIMAZOLE 1 APPLICATION: 10 CREAM TOPICAL at 08:01

## 2020-07-14 NOTE — ASSESSMENT & PLAN NOTE
· Rate controlled with metoprolol - decreased to 12 5 b i d  due to heart rate in 40s and 50s  · Warfarin for anticoagulation  · INR should be < 3, started coumadin 5 mg x 2 datys, INR 2 1 <- 2 4   Give 7 5 mg today    Lab Results   Component Value Date    INR 2 05 (H) 07/14/2020    INR 2 10 (H) 07/13/2020    INR 2 46 (H) 07/12/2020    PROTIME 23 0 (H) 07/14/2020    PROTIME 23 5 (H) 07/13/2020    PROTIME 26 5 (H) 07/12/2020     ·

## 2020-07-14 NOTE — ASSESSMENT & PLAN NOTE
Wt Readings from Last 3 Encounters:   07/14/20 (!) 140 kg (308 lb)   06/15/20 (!) 155 kg (341 lb 4 4 oz)   06/08/20 (!) 154 kg (340 lb 9 8 oz)     · Volume status is adequate  · Monitor intake and output  · Monitor daily weights  · Continue torsemide

## 2020-07-14 NOTE — OCCUPATIONAL THERAPY NOTE
[address] Progress Note     Patient Name: Andrew RICHEY Date: 7/14/2020  Problem List  Principal Problem:    Acute respiratory failure with hypoxia due to Pneumonia due to COVID-19 virus  Active Problems:    Diabetes mellitus type 2, uncontrolled (HCC)    Essential hypertension    Chronic atrial fibrillation (HCC)    Chronic diastolic congestive heart failure (HCC)    Type 2 acute myocardial infarction [facility])    Chronic obstructive pulmonary disease, unspecified (HCC)    CKD (chronic kidney disease)    Sepsis due to COVID-19 pneumonia ([facility] )          07/13/20 1041   Restrictions/Precautions   Weight Bearing Precautions Per Order No   Other Precautions Airborne/isolation;Droplet precautions;Contact/isolation; Bed Alarm; Chair Alarm;Cognitive  (covid -19)   Lifestyle   Autonomy Assistance with ADL's/IADL's, +RW with transfers to w/c, w/c dependent mobility   Reciprocal Relationships facility   Service to Others retired   Intrinsic Gratification joe   Pain Assessment   Pain Assessment Tool Pain Assessment not indicated - pt denies pain   Pain Score No Pain   ADL   Where Assessed Edge of bed   Eating Assistance 7  Independent   Grooming Assistance 4  Minimal Assistance   Grooming Deficit Setup; Wash/dry hands  (assist for thoroughness)   UB Bathing Assistance 4  Minimal Assistance   LB Bathing Assistance 2  Maximal Assistance   LB Bathing Deficit Right upper leg;Left upper leg;Buttocks; Perineal area   UB Dressing Assistance 3  Moderate Assistance   UB Dressing Deficit   (simulated hospital gown)   LB Dressing Assistance 2  Maximal Assistance   LB Dressing Deficit Don/doff R sock  (difficulty reaching right foot)   Toileting Assistance  1  Total Assistance   Toileting Deficit Perineal hygiene   Bed Mobility   Supine to Sit 4  Minimal assistance-into long sitting for task intiation, S to center self on EOB   Additional items Assist x 1   Transfers   Sit to Stand 3  Moderate assistance   Additional items Assist x 2   Stand to Sit 3  Moderate assistance   Additional items Assist x 2   Sliding Board transfer 4  Minimal assistance   Additional items Assist x 1  (bed to chair +slide board transfer cues to sequence task)   Additional Comments pt peformed sit to stand transfer with approx 50% upright stance, unable to place BUE on RW for full upright stance   Functional Mobility   Additional Comments unable to assess   Cognition   Overall Cognitive Status Impaired   Arousal/Participation Alert; Cooperative   Attention Attends with cues to redirect   Orientation Level Oriented X4   Memory Decreased recall of recent events;Decreased recall of precautions   Following Commands Follows one step commands with increased time or repetition   Comments pt with improved cognition/mood this am, able to hold conversation, continues to require verbal cues for transfers safety/sequencing tasks  Activity Tolerance   Activity Tolerance Patient limited by fatigue   Medical Staff Made Aware RN   Assessment   Assessment Patient participated in Skilled OT session this date with interventions consisting of functional transfers, self care tasks-see assistance levels above, goals extended by one week to continue with skilled OT and goals established on IE  Patient agreeable to OT treatment session, upon arrival patient was found supine in bed  In comparison to previous session, patient with improvements in cognition   Patient requiring verbal cues for safety, verbal cues for correct technique and frequent rest periods  Patient continues to be functioning below baseline level, occupational performance remains limited secondary to factors listed above and increased risk for falls and injury  From OT standpoint, recommendation at time of d/c would be Short Term Rehab     Patient to benefit from continued Occupational Therapy treatment while in the hospital to address deficits as defined above and maximize level of functional independence with ADLs and functional mobility  Plan   Treatment Interventions ADL retraining;Functional transfer training;UE strengthening/ROM; Endurance training;Cognitive reorientation;Patient/family training;Equipment evaluation/education; Compensatory technique education; Activityengagement; Energy conservation   Goal Expiration Date 07/28/20   OT Treatment Day 5   OT Frequency 3-5x/wk   Recommendation   OT Discharge Recommendation Post-Acute Rehabilitation Services   OT - OK to Discharge Raven Alan MOT, OTR/L

## 2020-07-14 NOTE — ASSESSMENT & PLAN NOTE
· Pt presents with 1 week symptoms at [facility]  · Upon presentation fell in moderate severity for COVID  · S/p 8-9 days of ceftriaxone and doxycycline  · Was treated with IV steroids for 10 days  · Continue vitamin-C, vitamin-D, zinc supplementation  · Wean O2 as tolerated  · Continue oral torsemide  · Pulm following, sputum cx ordered  · Curbside discussed with Infectious Disease, no indication for any other treatment regimen at this point of time at this level of oxygen  · CT chest: "Bilateral groundglass opacities are fairly diffuse in a heterogeneous pattern  Some areas of consolidation are noted  The findings are suspicious for pneumonia    Atypical organisms are possible including viral In the setting of clinically suspected/proven COVID-19, the above lung parenchymal findings on CT indicate intermediate confidence Follow-up is suggested in 2-3 months time to ensure resolution when the patient is no longer infectious "  · Pulmonary recommended to check sputum cultures rule out any superimposed bacterial infection also continue to titrate his O2  · F/u sputum results - mixed louann  · O2 requirement significantly improved  · Cleared for discharge

## 2020-07-14 NOTE — PLAN OF CARE
Problem: OCCUPATIONAL THERAPY ADULT  Goal: Performs self-care activities at highest level of function for planned discharge setting  See evaluation for individualized goals  Description  Treatment Interventions: ADL retraining, Functional transfer training, UE strengthening/ROM, Endurance training, Cognitive reorientation, Patient/family training, Equipment evaluation/education, Compensatory technique education, Activityengagement, Energy conservation          See flowsheet documentation for full assessment, interventions and recommendations  Outcome: Progressing  Note:   Limitation: Decreased ADL status, Decreased endurance, Decreased cognition, Decreased Safe judgement during ADL, Decreased self-care trans, Decreased high-level ADLs  Prognosis: Fair  Assessment: (P) Patient participated in Skilled OT session this date with interventions consisting of functional transfers, self care tasks-see assistance levels above, goals extended by one week to continue with skilled OT and goals established on IE  Patient agreeable to OT treatment session, upon arrival patient was found supine in bed  In comparison to previous session, patient with improvements in cognition   Patient requiring verbal cues for safety, verbal cues for correct technique and frequent rest periods  Patient continues to be functioning below baseline level, occupational performance remains limited secondary to factors listed above and increased risk for falls and injury  From OT standpoint, recommendation at time of d/c would be Short Term Rehab  Patient to benefit from continued Occupational Therapy treatment while in the hospital to address deficits as defined above and maximize level of functional independence with ADLs and functional mobility        OT Discharge Recommendation: Post-Acute Rehabilitation Services  OT - OK to Discharge: No

## 2020-07-14 NOTE — NURSING NOTE
Report called to [address] [place] [address] [place] at [facility]  Paperwork faxed  IV out  Belongings gathered  [address]

## 2020-07-14 NOTE — SOCIAL WORK
VM this morning from Lorenzo at [facility] requesting clinical for Acute rehab  CM called back explained to Lorenzo the request is for SNF rehab  Required documentation faxed to Lorenzo to initiate auth  Gonsalo received call from Radha French requesting clinical information for d/c to STR  Cm called Emerita at [facility] and switched the patients weekly injection to this Friday at [address] [facility] notified       Auth still pending

## 2020-07-14 NOTE — PLAN OF CARE
Problem: PAIN - ADULT  Goal: Verbalizes/displays adequate comfort level or baseline comfort level  Description  Interventions:  - Encourage patient to monitor pain and request assistance  - Assess pain using appropriate pain scale  - Administer analgesics based on type and severity of pain and evaluate response  - Implement non-pharmacological measures as appropriate and evaluate response  - Consider cultural and social influences on pain and pain management  - Notify physician/advanced practitioner if interventions unsuccessful or patient reports new pain  Outcome: Progressing     Problem: INFECTION - ADULT  Goal: Absence or prevention of progression during hospitalization  Description  INTERVENTIONS:  - Assess and monitor for signs and symptoms of infection  - Monitor lab/diagnostic results  - Monitor all insertion sites, i e  indwelling lines, tubes, and drains  - Monitor endotracheal if appropriate and nasal secretions for changes in amount and color  - [place] appropriate cooling/warming therapies per order  - Administer medications as ordered  - Instruct and encourage patient and family to use good hand hygiene technique  - Identify and instruct in appropriate isolation precautions for identified infection/condition  Outcome: Progressing  Goal: Absence of fever/infection during neutropenic period  Description  INTERVENTIONS:  - Monitor WBC    Outcome: Progressing     Problem: SAFETY ADULT  Goal: Patient will remain free of falls  Description  INTERVENTIONS:  - Assess patient frequently for physical needs  -  Identify cognitive and physical deficits and behaviors that affect risk of falls    -  [place] fall precautions as indicated by assessment   - Educate patient/family on patient safety including physical limitations  - Instruct patient to call for assistance with activity based on assessment  - Modify environment to reduce risk of injury  - Consider OT/PT consult to assist with strengthening/mobility  Outcome: Progressing  Goal: Maintain or return to baseline ADL function  Description  INTERVENTIONS:  -  Assess patient's ability to carry out ADLs; assess patient's baseline for ADL function and identify physical deficits which impact ability to perform ADLs (bathing, care of mouth/teeth, toileting, grooming, dressing, etc )  - Assess/evaluate cause of self-care deficits   - Assess range of motion  - Assess patient's mobility; develop plan if impaired  - Assess patient's need for assistive devices and provide as appropriate  - Encourage maximum independence but intervene and supervise when necessary  - Involve family in performance of ADLs  - Assess for home care needs following discharge   - Consider OT consult to assist with ADL evaluation and planning for discharge  - Provide patient education as appropriate  Outcome: Progressing  Goal: Maintain or return mobility status to optimal level  Description  INTERVENTIONS:  - Assess patient's baseline mobility status (ambulation, transfers, stairs, etc )    - Identify cognitive and physical deficits and behaviors that affect mobility  - Identify mobility aids required to assist with transfers and/or ambulation (gait belt, sit-to-stand, lift, walker, cane, etc )  - [place] fall precautions as indicated by assessment  - Record patient progress and toleration of activity level on Mobility SBAR; progress patient to next Phase/Stage  - Instruct patient to call for assistance with activity based on assessment  - Consider rehabilitation consult to assist with strengthening/weightbearing, etc   Outcome: Progressing     Problem: Prexisting or High Potential for Compromised Skin Integrity  Goal: Skin integrity is maintained or improved  Description  INTERVENTIONS:  - Identify patients at risk for skin breakdown  - Assess and monitor skin integrity  - Assess and monitor nutrition and hydration status  - Monitor labs   - Assess for incontinence   - Turn and reposition patient  - Assist with mobility/ambulation  - Relieve pressure over bony prominences  - Avoid friction and shearing  - Provide appropriate hygiene as needed including keeping skin clean and dry  - Evaluate need for skin moisturizer/barrier cream  - Collaborate with interdisciplinary team   - Patient/family teaching  - Consider wound care consult   Outcome: Progressing     Problem: Potential for Falls  Goal: Patient will remain free of falls  Description  INTERVENTIONS:  - Assess patient frequently for physical needs  -  Identify cognitive and physical deficits and behaviors that affect risk of falls  -  [place] fall precautions as indicated by assessment   - Educate patient/family on patient safety including physical limitations  - Instruct patient to call for assistance with activity based on assessment  - Modify environment to reduce risk of injury  - Consider OT/PT consult to assist with strengthening/mobility  Outcome: Progressing     Problem: Nutrition/Hydration-ADULT  Goal: Nutrient/Hydration intake appropriate for improving, restoring or maintaining nutritional needs  Description  Monitor and assess patient's nutrition/hydration status for malnutrition  Collaborate with interdisciplinary team and initiate plan and interventions as ordered  Monitor patient's weight and dietary intake as ordered or per policy  Utilize nutrition screening tool and intervene as necessary  Determine patient's food preferences and provide high-protein, high-caloric foods as appropriate       INTERVENTIONS:  - Monitor oral intake, urinary output, labs, and treatment plans  - Assess nutrition and hydration status and recommend course of action  - Evaluate amount of meals eaten  - Assist patient with eating if necessary   - Allow adequate time for meals  - Recommend/ encourage appropriate diets, oral nutritional supplements, and vitamin/mineral supplements  - Order, calculate, and assess calorie counts as needed  - Recommend, monitor, and adjust tube feedings and TPN/PPN based on assessed needs  - Assess need for intravenous fluids  - Provide specific nutrition/hydration education as appropriate  - Include patient/family/caregiver in decisions related to nutrition  Outcome: Progressing

## 2020-07-14 NOTE — TRANSPORTATION MEDICAL NECESSITY
Section I - General Information    Name of Patient: Sumit Garibay                 : 1950    Medicare #: [id number]  Transport Date: 20 (PCS is valid for round trips on this date and for all repetitive trips in the 60-day range as noted below )  Origin: [address] [facility]                                                         Destination: [facility]  Is the pt's stay covered under Medicare Part A (PPS/DRG)   [x]     Closest appropriate facility? If no, why is transport to more distant facility required? Yes  If hospice pt, is this transport related to pt's terminal illness? NA       Section II - Medical Necessity Questionnaire  Ambulance transportation is medically necessary only if other means of transport are contraindicated or would be potentially harmful to the patient  To meet this requirement, the patient must either be "bed confined" or suffer from a condition such that transport by means other than ambulance is contraindicated by the patient's condition  The following questions must be answered by the medical professional signing below for this form to be valid:    1)  Describe the MEDICAL CONDITION (physical and/or mental) of this patient AT [address] [place] [address] that requires the patient to be transported in an ambulance and why transport by other means is contraindicated by the patient's condition: Infection with special handling, requires O2 and an attendant    2) Is the patient "bed confined" as defined below? No  To be "be confined" the patient must satisfy all three of the following conditions: (1) unable to get up from bed without Assistance; AND (2) unable to ambulate; AND (3) unable to sit in a chair or wheelchair  3) Can this patient safely be transported by car or wheelchair van (i e , seated during transport without a medical attendant or monitoring)?    No    4) In addition to completing questions 1-3 above, please check any of the following conditions that apply*:   *Note: supporting documentation for any boxes checked must be maintained in the patient's medical records  If hosp-hosp transfer, describe services needed at 2nd facility not available at 1st facility? Danger to self/others  Medical attendant required   Requires oxygen-unable to self administer  Morbid obesity requires additional personnel/equipment to safely handle patient       Section III - Signature of Physician or Healthcare Professional  I certify that the above information is true and correct based on my evaluation of this patient, and represent that the patient requires transport by ambulance and that other forms of transport are contraindicated  I understand that this information will be used by the Centers for Medicare and Medicaid Services (CMS) to support the determination of medical necessity for ambulance services, and I represent that I have personal knowledge of the patient's condition at time of transport  []  If this box is checked, I also certify that the patient is physically or mentally incapable of signing the ambulance service's claim and that the institution with which I am affiliated has furnished care, services, or assistance to the patient  My signature below is made on behalf of the patient pursuant to 42 CFR §424 36(b)(4)  In accordance with 42 CFR §424 37, the specific reason(s) that the patient is physically or mentally incapable of signing the claim form is as follows:  Wang Christianson of Physician* or Healthcare Professional______________________________________________________________  Signature Date 07/14/20 (For scheduled repetitive transports, this form is not valid for transports performed more than 60 days after this date)    Printed Name & Credentials of Physician or Healthcare Professional (MD, DO, RN, etc )______Carrie Singh RN  __________________________  *Form must be signed by patient's attending physician for scheduled, repetitive transports   For non-repetitive, unscheduled ambulance transports, if unable to obtain the signature of the attending physician, any of the following may sign (choose appropriate option below)  [] Physician Assistant []  Clinical Nurse Specialist [x]  Registered Nurse  []  Nurse Practitioner  [x] Discharge Planner

## 2020-07-14 NOTE — ASSESSMENT & PLAN NOTE
Lab Results   Component Value Date    HGBA1C 8 8 (H) 06/25/2020       Recent Labs     07/13/20  2131 07/14/20  0743 07/14/20  1133 07/14/20  1606   POCGLU 163* 129 201* 144*       Blood Sugar Average: Last 72 hrs:  (P) 149 5   · Uncontrolled due to IV steroid use  · now off steroids & low BS   · Diabetic diet  · Will stop the Humalog and  Cont Lantus to 30 units HS  · Continue sliding scale coverage

## 2020-07-14 NOTE — DISCHARGE SUMMARY
Discharge- Alex Wong 1950, 81 y o  male MRN: [id number]    [facility]: [id number]    Primary Care Provider: Prachi Stone MD   Date and time admitted to hospital: 6/25/2020 11:43 AM        Sepsis due to COVID-19 pneumonia [facility])  Assessment & Plan  · Patient presenting with tachycardia, tachypnea, hypoxia and confirm COVID-19 pneumonia  · Now improved    CKD (chronic kidney disease)  Assessment & Plan  · Baseline creatinine around 1 2   · Management as above    Chronic obstructive pulmonary disease, unspecified (HCC)  Assessment & Plan  · Stable without any wheezing  · Continue home inhalers  Type 2 acute myocardial infarction [facility])  Assessment & Plan  · Likely due to acute respiratory failure from COVID-19 pneumonia  · Patient denies any chest pain or shortness of breath  · Will continue warfarin for anticoagulation given his lack of symptoms at this time  · Outpatient cardiac evaluation when stable    Chronic diastolic congestive heart failure [facility])  Assessment & Plan  Wt Readings from Last 3 Encounters:   07/14/20 (!) 140 kg (308 lb)   06/15/20 (!) 155 kg (341 lb 4 4 oz)   06/08/20 (!) 154 kg (340 lb 9 8 oz)     · Volume status is adequate  · Monitor intake and output  · Monitor daily weights  · Continue torsemide    Chronic atrial fibrillation (HCC)  Assessment & Plan  · Rate controlled with metoprolol - decreased to 12 5 b i d  due to heart rate in 40s and 50s  · Warfarin for anticoagulation  · INR should be < 3, started coumadin 5 mg x 2 datys, INR 2 1 <- 2 4  Give 7 5 mg today    Lab Results   Component Value Date    INR 2 05 (H) 07/14/2020    INR 2 10 (H) 07/13/2020    INR 2 46 (H) 07/12/2020    PROTIME 23 0 (H) 07/14/2020    PROTIME 23 5 (H) 07/13/2020    PROTIME 26 5 (H) 07/12/2020     ·     Essential hypertension  Assessment & Plan  · Well controlled    · Continue metoprolol   · Continue torsemide    Diabetes mellitus type 2, uncontrolled ([facility] [address] )  Assessment & Plan  Lab Results   Component Value Date    HGBA1C 8 8 (H) 06/25/2020       Recent Labs     07/13/20  2131 07/14/20  0743 07/14/20  1133 07/14/20  1606   POCGLU 163* 129 201* 144*       Blood Sugar Average: Last 72 hrs:  (P) 149 5   · Uncontrolled due to IV steroid use  · now off steroids & low BS   · Diabetic diet  · Will stop the Humalog and  Cont Lantus to 30 units HS  · Continue sliding scale coverage    * Acute respiratory failure with hypoxia due to Pneumonia due to COVID-19 virus  Assessment & Plan  · Pt presents with 1 week symptoms at [facility]  · Upon presentation fell in moderate severity for COVID  · S/p 8-9 days of ceftriaxone and doxycycline  · Was treated with IV steroids for 10 days  · Continue vitamin-C, vitamin-D, zinc supplementation  · Wean O2 as tolerated  · Continue oral torsemide  · Pulm following, sputum cx ordered  · Curbside discussed with Infectious Disease, no indication for any other treatment regimen at this point of time at this level of oxygen  · CT chest: "Bilateral groundglass opacities are fairly diffuse in a heterogeneous pattern  Some areas of consolidation are noted  The findings are suspicious for pneumonia    Atypical organisms are possible including viral In the setting of clinically suspected/proven COVID-19, the above lung parenchymal findings on CT indicate intermediate confidence Follow-up is suggested in 2-3 months time to ensure resolution when the patient is no longer infectious "  · Pulmonary recommended to check sputum cultures rule out any superimposed bacterial infection also continue to titrate his O2  · F/u sputum results - mixed louann  · O2 requirement significantly improved  · Cleared for discharge          Discharging Physician / Practitioner: Shirin Horton MD  PCP: Liliane Rudd MD  Admission Date:   Admission Orders (From admission, onward)     Ordered        06/25/20 1426  Inpatient Admission  Once                   Discharge Date: 07/14/20    Resolved Problems  Date Reviewed: 7/14/2020          Resolved    Acute renal failure (ARF) ([facility] [address] ) 7/4/2020     Resolved by  aRe Irwin MD          Consultations During Hospital Stay:  · Pulmonology    Procedures Performed:   · CT chest    Significant Findings / Test Results:   · Respiratory failure    Incidental Findings:   · INR 2 05    Test Results Pending at Discharge (will require follow up): · None     Outpatient Tests Requested:  · INR and potassium    Complications:  None    Reason for Admission:  Acute respiratory failure pneumonia with COVID-19 virus    Hospital Course:       Please see above list of diagnoses and related plan for additional information  Condition at Discharge: stable     Discharge Day Visit / Exam:     Subjective:  Patient seen examined bedside, no acute distress and some mild abdominal discomfort noted  When describing the pain, patient was nonspecific but pointed to his heparin injection site bruises and said that those areas were sensitive  No further testing required as this will likely resolve upon discharge with no further heparin shots to the area  Now, INRs therapeutic at 2 05; will discharge on warfarin 7 5 and request that accepting facility get further INR testing next week  Patient also with potassium 3 4 on morning labs; have repleted with 40 mEq potassium; request that accepting facility repeat potassium testing next week  Patient to be discharged to [facility];  at 6:30 p m     Will continue Lipitor, Breo, Lantus 30 units q h s , metoprolol 12 5 mg b i d , and warfarin as noted above  Patient admitted for respiratory failure; now saturating well on room air  See above for rest of treatment plan    Vitals: Blood Pressure: 119/58 (07/14/20 1515)  Pulse: 58 (07/14/20 1515)  Temperature: 98 °F (36 7 °C) (07/14/20 1515)  Temp Source: Oral (07/14/20 0745)  Respirations: 18 (07/14/20 1515)  Height: 6' 3" (190 5 cm) (06/25/20 1333)  Weight - Scale: (!) 140 kg (308 lb) (07/14/20 0532)  SpO2: 96 % (07/14/20 1515)  Exam:   Physical Exam   Constitutional: He is oriented to person, place, and time  He appears well-developed and well-nourished  No distress  HENT:   Head: Normocephalic and atraumatic  Nose: Nose normal    Mouth/Throat: No oropharyngeal exudate  Eyes: Pupils are equal, round, and reactive to light  EOM are normal  Right eye exhibits no discharge  Left eye exhibits no discharge  No scleral icterus  Neck: Normal range of motion  Neck supple  No JVD present  No tracheal deviation present  No thyromegaly present  Cardiovascular: Normal rate and regular rhythm  Exam reveals no gallop and no friction rub  No murmur heard  Pulmonary/Chest: Effort normal and breath sounds normal  No stridor  No respiratory distress  He has no wheezes  He has no rales  Abdominal: Soft  He exhibits distension  He exhibits no mass  There is tenderness (Tenderness and bruising on bilateral lower quadrant)  There is no rebound and no guarding  Musculoskeletal: He exhibits deformity  Left AKA   Neurological: He is alert and oriented to person, place, and time  Skin: Skin is warm and dry  He is not diaphoretic  Psychiatric: He has a normal mood and affect  His behavior is normal    Nursing note and vitals reviewed  Discussion with Family: Care plan discussed with patient who voiced understanding and agrees with recommendations  Discharge instructions/Information to patient and family:   See after visit summary for information provided to patient and family  Provisions for Follow-Up Care:  See after visit summary for information related to follow-up care and any pertinent home health orders        Disposition:     Other [facility] [address] [facility] SNF:   · Ty Barron Texted SNF Physician    Planned Readmission:  None     Discharge Statement:  I spent 35 minutes discharging the patient  This time was spent on the day of discharge  I had direct contact with the patient on the day of discharge  Greater than 50% of the total time was spent examining patient, answering all patient questions, arranging and discussing plan of care with patient as well as directly providing post-discharge instructions  Additional time then spent on discharge activities  Discharge Medications:  See after visit summary for reconciled discharge medications provided to patient and family        ** Please Note: This note has been constructed using a voice recognition system **

## 2020-07-14 NOTE — SOCIAL WORK
Cm updated patients son Christi Brown to d/c plan and gave Christi Brown address to [address] [place] [address]  Transport arranged for [facility] BLS with [facility]   IMM letter explained to patient and his son       Auth from [place] # [id number] Next update due 7/18 fax to [employer] [phone]

## 2020-07-15 DIAGNOSIS — C90.00 MULTIPLE MYELOMA NOT HAVING ACHIEVED REMISSION (HCC): Primary | ICD-10-CM

## 2020-07-15 RX ORDER — DEXAMETHASONE 4 MG/1
20 TABLET ORAL ONCE
Status: CANCELLED
Start: 2020-07-20

## 2020-07-17 ENCOUNTER — HOSPITAL ENCOUNTER (OUTPATIENT)
Dept: INFUSION CENTER | Facility: CLINIC | Age: 70
End: 2020-07-17

## 2020-07-20 ENCOUNTER — HOSPITAL ENCOUNTER (OUTPATIENT)
Dept: INFUSION CENTER | Facility: CLINIC | Age: 70
Discharge: HOME | End: 2020-07-20
Payer: COMMERCIAL

## 2020-07-20 VITALS
WEIGHT: 311.73 LBS | SYSTOLIC BLOOD PRESSURE: 119 MMHG | DIASTOLIC BLOOD PRESSURE: 75 MMHG | TEMPERATURE: 98.7 F | HEIGHT: 75 IN | HEART RATE: 61 BPM | BODY MASS INDEX: 38.76 KG/M2 | RESPIRATION RATE: 16 BRPM

## 2020-07-20 DIAGNOSIS — C90.00 MULTIPLE MYELOMA NOT HAVING ACHIEVED REMISSION (HCC): Primary | ICD-10-CM

## 2020-07-20 PROCEDURE — 96401 CHEMO ANTI-NEOPL SQ/IM: CPT

## 2020-07-20 RX ORDER — DEXAMETHASONE 4 MG/1
20 TABLET ORAL ONCE
Status: COMPLETED | OUTPATIENT
Start: 2020-07-20 | End: 2020-07-20

## 2020-07-20 RX ADMIN — DEXAMETHASONE 20 MG: 4 TABLET ORAL at 11:57

## 2020-07-20 RX ADMIN — BORTEZOMIB 3.5 MG: 3.5 INJECTION, POWDER, LYOPHILIZED, FOR SOLUTION INTRAVENOUS; SUBCUTANEOUS at 12:00

## 2020-07-20 NOTE — PROGRESS NOTES
Pt  Denied new symptoms or concerns today  Labs reviewed and within parameters for treatment ordered today  Velcade given SQ in Rt abdomen  Future appointments scheduled AVS provided  Appointments reviewed with [facility] transportation staff

## 2020-07-21 ENCOUNTER — TELEPHONE (OUTPATIENT)
Dept: INTERNAL MEDICINE CLINIC | Facility: CLINIC | Age: 70
End: 2020-07-21

## 2020-07-21 LAB
BACTERIA SPT RESP CULT: ABNORMAL
BACTERIA SPT RESP CULT: ABNORMAL
GRAM STN SPEC: ABNORMAL

## 2020-07-23 DIAGNOSIS — R93.89 ABNORMAL CHEST CT: Primary | ICD-10-CM

## 2020-07-23 DIAGNOSIS — C90.00 MULTIPLE MYELOMA NOT HAVING ACHIEVED REMISSION (HCC): Primary | ICD-10-CM

## 2020-07-23 RX ORDER — DEXAMETHASONE 4 MG/1
20 TABLET ORAL ONCE
Status: CANCELLED
Start: 2020-07-27

## 2020-07-23 RX ORDER — DEXAMETHASONE 4 MG/1
20 TABLET ORAL ONCE
Status: CANCELLED
Start: 2020-09-08

## 2020-07-23 RX ORDER — DEXAMETHASONE 4 MG/1
20 TABLET ORAL ONCE
Status: CANCELLED
Start: 2020-08-31

## 2020-07-23 RX ORDER — DEXAMETHASONE 4 MG/1
20 TABLET ORAL ONCE
Status: CANCELLED
Start: 2020-08-03

## 2020-07-23 RX ORDER — DEXAMETHASONE 4 MG/1
20 TABLET ORAL ONCE
Status: CANCELLED
Start: 2020-08-24

## 2020-07-23 RX ORDER — DEXAMETHASONE 4 MG/1
20 TABLET ORAL ONCE
Status: CANCELLED
Start: 2020-09-14

## 2020-07-23 RX ORDER — DEXAMETHASONE 4 MG/1
20 TABLET ORAL ONCE
Status: CANCELLED
Start: 2020-08-10

## 2020-07-23 NOTE — PROGRESS NOTES
Sputum culture from recent hospitalization shows 1+ growth of Aspergillus  Likely colonization  He will need repeat CT within the next few weeks to document resolution of infiltrates noted on scan from 7/6/20

## 2020-07-27 ENCOUNTER — HOSPITAL ENCOUNTER (OUTPATIENT)
Dept: INFUSION CENTER | Facility: CLINIC | Age: 70
Discharge: HOME | End: 2020-07-27
Payer: COMMERCIAL

## 2020-07-27 VITALS
DIASTOLIC BLOOD PRESSURE: 73 MMHG | RESPIRATION RATE: 18 BRPM | SYSTOLIC BLOOD PRESSURE: 123 MMHG | HEIGHT: 75 IN | BODY MASS INDEX: 38.76 KG/M2 | TEMPERATURE: 98.1 F | WEIGHT: 311.73 LBS | HEART RATE: 73 BPM

## 2020-07-27 DIAGNOSIS — C90.00 MULTIPLE MYELOMA NOT HAVING ACHIEVED REMISSION (HCC): Primary | ICD-10-CM

## 2020-07-27 PROCEDURE — 96401 CHEMO ANTI-NEOPL SQ/IM: CPT

## 2020-07-27 RX ORDER — DEXAMETHASONE 4 MG/1
20 TABLET ORAL ONCE
Status: COMPLETED | OUTPATIENT
Start: 2020-07-27 | End: 2020-07-27

## 2020-07-27 RX ADMIN — BORTEZOMIB 3.4 MG: 3.5 INJECTION, POWDER, LYOPHILIZED, FOR SOLUTION INTRAVENOUS; SUBCUTANEOUS at 11:41

## 2020-07-27 RX ADMIN — DEXAMETHASONE 20 MG: 4 TABLET ORAL at 11:26

## 2020-07-27 NOTE — PROGRESS NOTES
Pt arrived on unit for velcade  Pt c/o weakness, recently recovered from covid  Pt has caregiver at bedside  Will cont to hermelinda

## 2020-07-28 ENCOUNTER — TELEPHONE (OUTPATIENT)
Dept: HEMATOLOGY ONCOLOGY | Facility: CLINIC | Age: 70
End: 2020-07-28

## 2020-07-28 NOTE — TELEPHONE ENCOUNTER
Mason Chavez is calling in from [facility] inquiring whether patient would be able to be seen in our [facility] office being that they do not take their patients to neither [facility] or [facility]

## 2020-07-30 ENCOUNTER — TELEPHONE (OUTPATIENT)
Dept: HEMATOLOGY ONCOLOGY | Facility: CLINIC | Age: 70
End: 2020-07-30

## 2020-07-30 NOTE — TELEPHONE ENCOUNTER
Patient has been residing in the [facility] recently and had all his treatments and scans scheduled in [facility]  We received a call from the facility requesting the office visit be transferred to an [facility] provider as well  A short time later the facility indicated he would be moving back to his original facility in [facility] on 8/3 so the follow up visit with Adalid Carolina would need to be rescheduled  I discussed this with Fletcher Presley and given the fact that the patient will be moving, had been hospitalized and recovered from [facility] he should be seen prior to resuming treatment    I will call the [facility]  Infusion center to cancel the appointment for 8/4 and he will see Fletcher Presley 8/7 in [facility]

## 2020-08-03 ENCOUNTER — ANTICOAG VISIT (OUTPATIENT)
Dept: CARDIOLOGY CLINIC | Facility: CLINIC | Age: 70
End: 2020-08-03

## 2020-08-03 DIAGNOSIS — G47.00 INSOMNIA, UNSPECIFIED TYPE: ICD-10-CM

## 2020-08-03 DIAGNOSIS — I48.20 CHRONIC ATRIAL FIBRILLATION (HCC): ICD-10-CM

## 2020-08-03 LAB — INR PPP: 1.8 (ref 0.84–1.19)

## 2020-08-03 RX ORDER — CHOLECALCIFEROL (VITAMIN D3) 125 MCG
CAPSULE ORAL
Qty: 30 TABLET | Refills: 2 | OUTPATIENT
Start: 2020-08-03

## 2020-08-03 NOTE — PROGRESS NOTES
Tc to [address] [place] [address], spoke with nurse, she had faxed d/c med list , pt discharged from rehab today taking 5 5 mg daily per list, using 2 5 mg and 3 mg tablet to equal 5 5 mg daily    will continue 5 5 mg daily   inr due thnicki  Faxed to [facility] and [address] [place] [address]

## 2020-08-04 ENCOUNTER — HOSPITAL ENCOUNTER (OUTPATIENT)
Dept: INFUSION CENTER | Facility: CLINIC | Age: 70
Discharge: HOME | End: 2020-08-04
Payer: COMMERCIAL

## 2020-08-04 ENCOUNTER — TELEPHONE (OUTPATIENT)
Dept: HEMATOLOGY ONCOLOGY | Facility: CLINIC | Age: 70
End: 2020-08-04

## 2020-08-04 VITALS
DIASTOLIC BLOOD PRESSURE: 74 MMHG | RESPIRATION RATE: 18 BRPM | TEMPERATURE: 98 F | HEART RATE: 72 BPM | SYSTOLIC BLOOD PRESSURE: 111 MMHG

## 2020-08-04 DIAGNOSIS — C90.00 MULTIPLE MYELOMA NOT HAVING ACHIEVED REMISSION (HCC): ICD-10-CM

## 2020-08-04 LAB
ALBUMIN SERPL BCP-MCNC: 3.1 G/DL (ref 3.5–5.7)
ALP SERPL-CCNC: 104 U/L (ref 46–116)
ALT SERPL W P-5'-P-CCNC: 19 U/L (ref 12–78)
ANION GAP SERPL CALCULATED.3IONS-SCNC: 12 MMOL/L (ref 4–13)
ANISOCYTOSIS BLD QL SMEAR: PRESENT
AST SERPL W P-5'-P-CCNC: 23 U/L (ref 5–45)
BASOPHILS # BLD MANUAL: 0 THOUSAND/UL (ref 0–0.1)
BASOPHILS NFR MAR MANUAL: 0 % (ref 0–1)
BILIRUB SERPL-MCNC: 0.8 MG/DL (ref 0.2–1)
BUN SERPL-MCNC: 18 MG/DL (ref 5–25)
CALCIUM SERPL-MCNC: 9.2 MG/DL (ref 8.3–10.1)
CHLORIDE SERPL-SCNC: 105 MMOL/L (ref 98–108)
CO2 SERPL-SCNC: 27 MMOL/L (ref 21–32)
CREAT SERPL-MCNC: 1.3 MG/DL (ref 0.6–1.3)
EOSINOPHIL # BLD MANUAL: 0.07 THOUSAND/UL (ref 0–0.4)
EOSINOPHIL NFR BLD MANUAL: 1 % (ref 0–6)
ERYTHROCYTE [DISTWIDTH] IN BLOOD BY AUTOMATED COUNT: 18.9 % (ref 11.6–15.1)
GFR SERPL CREATININE-BSD FRML MDRD: 56 ML/MIN/1.73SQ M
GLUCOSE SERPL-MCNC: 136 MG/DL (ref 65–140)
HCT VFR BLD AUTO: 37.6 % (ref 36.5–49.3)
HGB BLD-MCNC: 11.3 G/DL (ref 12–17)
LG PLATELETS BLD QL SMEAR: PRESENT
LYMPHOCYTES # BLD AUTO: 1.23 THOUSAND/UL (ref 0.6–4.47)
LYMPHOCYTES # BLD AUTO: 17 % (ref 14–44)
MCH RBC QN AUTO: 27.9 PG (ref 26.8–34.3)
MCHC RBC AUTO-ENTMCNC: 30.1 G/DL (ref 31.4–37.4)
MCV RBC AUTO: 93 FL (ref 82–98)
MONOCYTES # BLD AUTO: 0.43 THOUSAND/UL (ref 0–1.22)
MONOCYTES NFR BLD: 6 % (ref 4–12)
MYELOCYTES NFR BLD MANUAL: 1 % (ref 0–1)
NEUTROPHILS # BLD MANUAL: 5.41 THOUSAND/UL (ref 1.85–7.62)
NEUTS BAND NFR BLD MANUAL: 2 % (ref 0–8)
NEUTS SEG NFR BLD AUTO: 73 % (ref 43–75)
PLATELET # BLD AUTO: 207 THOUSANDS/UL (ref 149–390)
PLATELET BLD QL SMEAR: ADEQUATE
PMV BLD AUTO: 11.2 FL (ref 8.9–12.7)
POTASSIUM SERPL-SCNC: 4.2 MMOL/L (ref 3.5–5.3)
PROT SERPL-MCNC: 6.2 G/DL (ref 6.4–8.2)
RBC # BLD AUTO: 4.05 MILLION/UL (ref 3.88–5.62)
SODIUM SERPL-SCNC: 144 MMOL/L (ref 136–145)
TOTAL CELLS COUNTED SPEC: 100
WBC # BLD AUTO: 7.21 THOUSAND/UL (ref 4.31–10.16)

## 2020-08-04 PROCEDURE — 85027 COMPLETE CBC AUTOMATED: CPT | Performed by: INTERNAL MEDICINE

## 2020-08-04 PROCEDURE — 85007 BL SMEAR W/DIFF WBC COUNT: CPT | Performed by: INTERNAL MEDICINE

## 2020-08-04 PROCEDURE — 80053 COMPREHEN METABOLIC PANEL: CPT | Performed by: INTERNAL MEDICINE

## 2020-08-04 NOTE — PROGRESS NOTES
Hematology/Oncology Outpatient Follow- up Note  Louise Has 1950, [id number]  8/7/2020        Chief Complaint   Patient presents with    Follow-up       HPI:  Domo Medina is a 80 yo male with multiple comorbidities including diabetes and hypertension with bone marrow biopsy proven multiple myeloma  He was referred to us in 8/2019 after 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  He was noted to have elevated kidney function and anemia which prompted work up for multiple myeloma  His skeletal survey was negative  He underwent bone marrow biopsy confirming multiple myeloma  He was initiated on treatment with with Velcade and Decadron weekly on a 35 day schedule   Velcade is 1 3 milligrams/meter squared and Decadron is 20 mg p o  On days 1, 8, 15, 22       Previous Hematologic/ Oncologic History:    Oncology History   Multiple myeloma ([facility] )   9/16/2019 Initial Diagnosis    Multiple myeloma not having achieved remission ([facility] )     9/24/2019 -  Chemotherapy    iron sucrose (VENOFER) injection 200 mg, 200 mg (100 % of original dose 200 mg), Intravenous, Once, 1 of 1 cycle  Dose modification: 200 mg (original dose 200 mg, Cycle 1, Reason: Other (See Comments))  Administration: 200 mg (9/24/2019)  bortezomib (VELCADE) subcutaneous injection 3 5 mg, 1 3 mg/m2 = 3 5 mg (81 3 % of original dose 1 6 mg/m2), Subcutaneous, Once, 7 of 12 cycles  Dose modification: 1 3 mg/m2 (original dose 1 6 mg/m2, Cycle 1, Reason: Dose Not Tolerated)  Administration: 3 5 mg (9/24/2019), 3 5 mg (10/1/2019), 3 5 mg (10/8/2019), 3 5 mg (10/15/2019), 3 5 mg (1/6/2020), 3 5 mg (1/13/2020), 3 5 mg (1/20/2020), 3 5 mg (1/27/2020), 3 5 mg (2/11/2020), 3 5 mg (2/17/2020), 3 5 mg (2/24/2020), 3 5 mg (3/2/2020), 3 5 mg (3/16/2020), 3 5 mg (3/23/2020), 3 5 mg (3/30/2020), 3 5 mg (4/6/2020), 3 5 mg (4/20/2020), 3 5 mg (4/27/2020), 3 5 mg (5/4/2020), 3 5 mg (5/11/2020), 3 5 mg (5/26/2020), 3 5 mg (6/1/2020), 3 5 mg (2020), 3 5 mg (6/15/2020), 3 5 mg (2020), 3 4 mg (2020)         Current Hematologic/ Oncologic Treatment:    Velcade and Decadron    ECO - Symptomatic, <50% confined to bed    Interval History:  The patient returns for a follow up visit  He was hospitalized at [facility] (-20) with COVID-19  He has recovered  Cycle 7 was delayed d/t his hospitalization  Day 15 of cycle 7 that was due on 8/3 was cancelled d/t right leg wound  He is scheduled for day 22 next week  Upon presentation today, he is noted to have redness and warmth of his right lower extremity with a bandage over his shin stating that he as a wound there that has had purulent drainage  He denies pain at site  Denies fevers/chills  He endorsing some weakness, but feels this is getting better since being at rehab  Appetite remains good  Denies abdominal pain, N/V/D/C    Most recent labs completed (CBC and CMP) on 20 were reviewed  His hemoglobin is stable at 10 7, Platelets 713  His kidney function is stable with creatinine of 1 12  his calcium is normal 8 5  He has not had any myeloma labs done since May 2020  Cancer Staging:  Cancer Staging  No matching staging information was found for the patient  Molecular Testing:         Test Results:    Imaging: Xr Chest Portable    Result Date: 2020  Narrative: CHEST INDICATION:   hypoxia  COMPARISON:  Chest radiograph from 2020  Chest CT from 10/29/2019  EXAM PERFORMED/VIEWS:  XR CHEST PORTABLE FINDINGS: Cardiomegaly  Patchy consolidation throughout the right lung  No effusion or pneumothorax  Old right clavicle fracture  Impression: Patchy consolidation throughout the right lung which could be due to pneumonia  Workstation performed: [id number]     Ct Chest Wo Contrast    Result Date: 7/10/2020  Narrative: CT CHEST WITHOUT IV CONTRAST INDICATION:   Pneumonia Shortness of breath Pleural effusion   Patient has confirmed COVID-19 diagnosed weeks ago; history of myeloma  COMPARISON:  10/29/2019; abdomen from 11/4/2019; chest x-ray from 7/8/2020 as well as 6/25/2020 TECHNIQUE: CT examination of the chest was performed without intravenous contrast   Axial, sagittal, and coronal 2D reformatted images were created from the source data and submitted for interpretation  Radiation dose length product (DLP) for this visit:  1494 mGy-cm   This examination, like all CT scans performed in the [facility], was performed utilizing techniques to minimize radiation dose exposure, including the use of iterative reconstruction and automated exposure control  FINDINGS: LUNGS:  There are scattered groundglass opacities noted bilaterally in the lungs  These are not rounded  Some are peripheral based; however, some are more centrally positioned  Denser consolidation is noted in the lingula with air bronchograms  Denser consolidation is noted at the bases and a patchy configuration    Mucous extends from the trachea to the left mainstem bronchus  This appears nonocclusive  PLEURA:  Unremarkable  HEART/GREAT VESSELS:  Coronary calcification is seen    MEDIASTINUM AND MARGA: Mediastinal nodes are somewhat numerous but small and not significantly changed since the prior examination  Hiatus hernia is seen  CHEST WALL AND LOWER NECK:   Bilateral gynecomastia is noted  VISUALIZED STRUCTURES IN THE UPPER ABDOMEN:  Gallstones are identified  Small hypodensity is noted inferiorly in the liver on image 62 most likely a cyst and stable since the prior abdominal examination  OSSEOUS STRUCTURES:  No acute fracture or destructive osseous lesion  Hemangioma is noted at the T8 level  This is unchanged  Impression: Bilateral groundglass opacities are fairly diffuse in a heterogeneous pattern  Some areas of consolidation are noted  The findings are suspicious for pneumonia    Atypical organisms are possible including viral  In the setting of clinically suspected/proven COVID-19, the above lung parenchymal findings on CT indicate intermediate confidence Follow-up is suggested in 2-3 months time to ensure resolution when the patient is no longer infectious  The study was marked in EPIC for significant notification  Workstation performed: [id number]       Labs:   Lab Results   Component Value Date    WBC 7 21 08/04/2020    HGB 11 3 (L) 08/04/2020    HCT 37 6 08/04/2020    MCV 93 08/04/2020     08/04/2020     Lab Results   Component Value Date     01/15/2018    K 4 2 08/04/2020     08/04/2020    CO2 27 08/04/2020    ANIONGAP 11 12/22/2015    BUN 18 08/04/2020    CREATININE 1 30 08/04/2020    GLUCOSE 139 12/22/2015    GLUF 141 (H) 02/26/2019    CALCIUM 9 2 08/04/2020    AST 23 08/04/2020    ALT 19 08/04/2020    ALKPHOS 104 08/04/2020    PROT 7 1 01/15/2018    BILITOT 0 6 01/15/2018    EGFR 56 08/04/2020         Lab Results   Component Value Date    SPEP  01/06/2020     The SPEP shows a biclonal gammopathy  Previous immunofixation on 8/05/19 identified two IgA kappa bands  Reviewed by: Azeem Singh MD  ([id number])  **Electronic Signature**    UPEP  08/04/2019     The UPEP shows non-selective proteinuria  The UPEP shows a monoclonal gammopathy  Immunofixation to be performed  Reviewed by: Lobo Roth MD **Electronic Signature**       Lab Results   Component Value Date    IRON 49 (L) 11/01/2019    TIBC 254 11/01/2019    FERRITIN 985 (H) 07/08/2020       Review of Systems   Constitutional: Positive for activity change and fatigue  Musculoskeletal: Positive for gait problem  L AKA   Skin: Positive for color change (errythema RLE) and wound  Neurological: Positive for weakness           Active Problems:   Patient Active Problem List   Diagnosis    Diabetic foot ulcer [facility] )    Diabetes mellitus type 2, uncontrolled ([facility] )    Chronic venous hypertension, right    Essential hypertension    Chronic atrial fibrillation (HCC)    Morbid obesity (HCC)    Chronic diastolic congestive heart failure (HCC)    Cardiomyopathy (HCC)    Diabetes, polyneuropathy (HCC)    GERD (gastroesophageal reflux disease)    Hyperlipidemia    Onychomycosis    Gallstones    Localized edema    Peripheral vascular disease (HCC)    SOB (shortness of breath)    NALLELY (obstructive sleep apnea)    Moderate persistent asthma without complication    Multiple myeloma (HCC)    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)    Hiatal hernia    Weakness    Type 2 acute myocardial infarction (HCC)    Chronic obstructive pulmonary disease, unspecified (HCC)    Hypertensive chronic kidney disease w stg 1-4/unsp chr kdny    Mass of psoas muscle    CKD (chronic kidney disease)    Chronic diastolic (congestive) heart failure (HCC)    Lymphedema    Rash    Difficulty in walking, not elsewhere classified    Gout    Venous insufficiency (chronic) (peripheral)    Acute respiratory failure with hypoxia due to Pneumonia due to COVID-19 virus    Sepsis due to COVID-19 pneumonia [facility])       Past Medical History:   Past Medical History:   Diagnosis Date    Cancer ([facility] )     CHF (congestive heart failure) (HCC)     Chronic kidney disease     COPD (chronic obstructive pulmonary disease) (HCC)     Decubitus ulcer of heel     LAST ASSESSED [id number]    Diabetes mellitus ([facility] )     History of varicose veins     Hypertension     Intermittent claudication (HCC)     Neuropathy     Seasonal allergies        Surgical History:   Past Surgical History:   Procedure Laterality Date    AMPUTATION ABOVE KNEE (AKA) Left 7/31/2019    Procedure: AMPUTATION ABOVE KNEE (AKA);   Surgeon: Chelsie Tabares MD;  Location:  MAIN OR;  Service: General    ANGIOPLASTY      W/ FLUOROSC ANGIOGRAPGY PERIPHERAL ARTERY ADDITIONAL  LAST ASSESSED [id number]    ANGIOPLASTY / STENTING FEMORAL      TANSCATH INTRAVASCULAR STENT PLACEMENT PERCUTANEOUS FEMORAL     COLONOSCOPY  2010    CT BONE MARROW BIOPSY AND ASPIRATION 8/9/2019    FULL THICKNESS SKIN GRAFT      TENDON REPAIR      TOE AMPUTATION Left 12/27/2018    Procedure: AMPUTATION left 4th TOE;  Surgeon: Megan Brody DPM;  Location: [facility] OR;  Service: Podiatry       Family History:    Family History   Problem Relation Age of Onset    Other Mother         CARDIAC DISORDER     Diabetes Mother     Cancer Father     Other Family         CARDIAC DISORDER     Diabetes Family     Cancer Family     Mental illness Family     Kidney disease Sister     Diabetes Sister        Cancer-related family history includes Cancer in his family and father      Social History:   Social History     Socioeconomic History    Marital status:      Spouse name: Not on file    Number of children: 1    Years of education: Not on file    Highest education level: Not on file   Occupational History    Occupation: RETIRED   Social Needs    Financial resource strain: Not on file    Food insecurity     Worry: Not on file     Inability: Not on file   [employer] needs     Medical: Not on file     Non-medical: Not on file   Tobacco Use    Smoking status: Never Smoker    Smokeless tobacco: Never Used   Substance and Sexual Activity    Alcohol use: Not Currently    Drug use: Not Currently    Sexual activity: Not Currently   Lifestyle    Physical activity     Days per week: Not on file     Minutes per session: Not on file    Stress: Not on file   Relationships    Social connections     Talks on phone: Not on file     Gets together: Not on file     Attends Adventism service: Not on file     Active member of club or organization: Not on file     Attends meetings of clubs or organizations: Not on file     Relationship status: Not on file    Intimate partner violence     Fear of current or ex partner: Not on file     Emotionally abused: Not on file     Physically abused: Not on file     Forced sexual activity: Not on file   Other Topics Concern    Not on file   Social History Narrative    DENIED ACTIVE ADVANCED DIRECTIVE    ALWAYS USES [employer]    CAFFEINE USE    [employer]    FEELS SAFE AT HOME    LIVES WITH FAMILY - SISTER    NO LIVING WILL    POA IN EXISTENCE     SUPPORTIVE AND SAFE    One son, 2 granddaughters       Current Medications:   Current Outpatient Medications   Medication Sig Dispense Refill    acetaminophen (TYLENOL) 500 mg tablet Take 1 tablet (500 mg total) by mouth every 6 (six) hours as needed for mild pain, headaches or fever 90 tablet 1    albuterol (PROVENTIL HFA,VENTOLIN HFA) 90 mcg/act inhaler Inhale 2 puffs every 6 (six) hours as needed for wheezing 1 Inhaler 0    Alcohol Swabs (ALCOHOL PREP) 70 % PADS   0    allopurinol (ZYLOPRIM) 300 mg tablet TAKE ONE TABLET BY MOUTH DAILY (Patient taking differently: Take 100 mg by mouth daily ) 30 tablet 5    ammonium lactate (LAC-HYDRIN) 12 % lotion APPLY TO BLE TOPICALLY DAILY 400 g 2    atorvastatin (LIPITOR) 40 mg tablet Take 1 tablet (40 mg total) by mouth daily after dinner 30 tablet 0    BD AUTOSHIELD DUO 30G X 5 MM MISC USE AS DIRECTED WITH INSULIN FOUR TIMES A  each 3    BD INSULIN SYRINGE U/F 31G X 5/16" 0 5 ML MISC USE AS DIRECTED WITH NOVOLIN 70/30  0    BD PEN NEEDLE HILARIO U/F 32G X 4 MM MISC by Other route 3 (three) times a day 300 each 1    bisacodyl (DULCOLAX) 10 mg suppository Insert 10 mg into the rectum as needed for constipation      bortezomib (VELCADE) Infuse into a venous catheter once      clotrimazole (LOTRIMIN) 1 % cream APPLY TO BUTTOCK 2 TIMES DAILY 45 g 3    Easy Touch Safety Lancets 28G MISC USE 4 TIMES DAILY TO TEST BLOOD SUGAR 100 each 5    fluticasone-salmeterol (ADVAIR DISKUS, WIXELA INHUB) 250-50 mcg/dose inhaler Inhale 1 puff 2 (two) times a day Rinse mouth after use   1 Inhaler 5    insulin glargine (LANTUS SOLOSTAR) 100 units/mL injection pen Inject 22 Units under the skin daily 5 pen 5    magnesium hydroxide (MILK OF MAGNESIA) 400 mg/5 mL oral suspension Take 30 mL by mouth daily as needed for constipation      Melatonin 5 MG TABS TAKE ONE TABLET BY MOUTH EVERY NIGHT AT BEDTIME 30 tablet 2    metFORMIN (GLUCOPHAGE) 1000 MG tablet TAKE ONE TABLET BY MOUTH TWO TIMES A DAY 60 tablet 2    metoprolol tartrate (LOPRESSOR) 25 mg tablet Take 0 5 tablets (12 5 mg total) by mouth every 12 (twelve) hours 30 tablet 0    NOVOLOG FLEXPEN 100 units/mL SOPN INJ 5U BEFORE MEALS AND PER SS <250=NO CALL 250-300=5U,301-350= 10U,351-400=15U,401-450=20U>451 CALL FOR ORDERS UP TO 4X PER DAY 15 mL 5    torsemide (DEMADEX) 10 mg tablet TAKE 2 TABLETS BY MOUTH TWO TIMES A  tablet 2    triamcinolone (KENALOG) 0 1 % cream APPLY TO THE FACE TWO TIMES A DAY 30 g 2    warfarin (COUMADIN) 7 5 mg tablet 7 5 mg on August 12th and 13th then INR on August 14th (Patient taking differently: Take 5 mg by mouth daily Takes 1 5 tabs at HS every Sunday, Tuesday, Thursday  Takes 2 tabs HS every Monday, Wednesday, Friday and Saturday) 30 tablet 0    bisacodyl (bisacodyl) 5 mg EC tablet Take 5 mg by mouth daily as needed for constipation      cephalexin (KEFLEX) 500 mg capsule Take 1 capsule (500 mg total) by mouth every 6 (six) hours for 10 days 40 capsule 0    Insulin Pen Needle 31G X 5 MM MISC by Does not apply route 2 (two) times a day for 50 days 100 each 0     No current facility-administered medications for this visit  Allergies: Allergies   Allergen Reactions    Latex Rash       Physical Exam:  /76 (BP Location: Left arm, Patient Position: Sitting, Cuff Size: Adult)   Pulse 69   Temp 98 4 °F (36 9 °C) (Tympanic)   Resp 18   Ht 6' 2" (1 88 m)   Wt (!) 145 kg (320 lb) Comment: Pt stated a week ago he weiged 320 lbs  SpO2 97%   BMI 41 09 kg/m²   Body surface area is 2 65 meters squared      Wt Readings from Last 3 Encounters:   08/07/20 (!) 145 kg (320 lb)   07/27/20 (!) 141 kg (311 lb 11 7 oz)   07/20/20 (!) 141 kg (311 lb 11 7 oz) Physical Exam  Constitutional:       General: He is not in acute distress  Appearance: He is obese  He is not ill-appearing or toxic-appearing  Comments: Alert, pleasant and cooperative  Arrive via WC  HENT:      Head: Normocephalic and atraumatic  Mouth/Throat:      Mouth: Mucous membranes are moist       Pharynx: Oropharynx is clear  Eyes:      General: No scleral icterus  Right eye: No discharge  Left eye: No discharge  Extraocular Movements: Extraocular movements intact  Neck:      Musculoskeletal: Normal range of motion  Cardiovascular:      Rate and Rhythm: Normal rate and regular rhythm  Pulmonary:      Effort: Pulmonary effort is normal       Breath sounds: Normal breath sounds  Abdominal:      General: Bowel sounds are normal       Palpations: Abdomen is soft  Tenderness: There is no abdominal tenderness  Musculoskeletal:      Comments: L AKA   Skin:     General: Skin is warm and dry  Findings: Erythema (RLE ) and lesion (leg wounds) present  Neurological:      General: No focal deficit present  Mental Status: He is alert and oriented to person, place, and time  Psychiatric:         Mood and Affect: Mood normal          Behavior: Behavior normal          Assessment / Plan:    1  Multiple myeloma not having achieved remission [facility])   The patient is a very pleasant 80 yo male with multiple comorbidities including diabetes and hypertension with bone marrow biopsy proven multiple myeloma  He was referred to us in 8/2019 after 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  He was noted to have elevated kidney function and anemia which prompted work up for multiple myeloma  His skeletal survey was negative  He underwent bone marrow biopsy confirming multiple myeloma  He was initiated on treatment with with Velcade and Decadron weekly on a 35 day schedule  Velcade is 1 3 milligrams/meter squared and Decadron is 20 mg p o   On days 1, 8, 15, 22  Cycle 7 was delayed d/t his hospitalization  Day 15 of cycle 7 that was due on 8/3 was cancelled d/t right leg wound  He is scheduled for day 22 next week  I will defer this treatment as well for what appears to be an untreated cellulitis of his right lower leg  I will order a course of Keflex  He has not had recent myeloma labs completed  These have been requested today  He will return for follow up in one week to re-assess his response to Keflex with lab studies  Patient was agreeable to plan of care  He was instructed to call with any questions or concerns prior to his next office visit in one week  2  Cellulitis of right lower extremity   Patient has what appears to be cellulitis of his right lower extremity  Will start a course of Keflex 500 mg Q 6 x 10 days since he is immunocompromised  I have also recommended he be seen at wound care clinic  I have written these recommendations on his paperwork for nursing staff to follow at [facility]    Patient was instructed to notify us if his leg worsens while taking Keflex as he may need to be evaluated in ED for IV antibiotics  Goals and Barriers:  Current Goal:  Prolong Survival from Multiple Myeloma   Barriers: None  Patient's Capacity to Self Care:  Patient able to self care  Portions of the record may have been created with voice recognition software  Occasional wrong word or "sound a like" substitutions may have occurred due to the inherent limitations of voice recognition software  Read the chart carefully and recognize, using context, where substitutions have occurred

## 2020-08-04 NOTE — PROGRESS NOTES
Patient arrived on unit for Velcade  When I asked patient about bandage on R leg, patient stated "pus was coming from open area last night and someone put a bandage on it " Patient stated that he "hopes it is not infected "  Patient unable to tell me who put bandage on or other information  When questioning, patient was very vague with responses  Called and spoke with Gabby Stanford RN and made aware of R leg wound  Also made aware that last labs were last drawn  on 7/24/2020 and calcium level of 7 9  David Anderson reviewed above with Dr Jessica Pascalde held today  CBC/CMP to be drawn  Instructed patient and written instructions sent along with him to [facility] making them aware, as per Dr Jessica López, R leg wound needs to be evaluated by wound care or rounding physician prior to next scheduled Velcade  Also made aware that CBC/CMP drawn today while here at infusion center  I made follow up call and spoke with Yogi Escobar, at [facility], to reiterate above information  Instructed to call Dr Mushtaq Stevens office if any issues/concerns  Patient aware of above and understands  Left unit and [facility] transport transported patient

## 2020-08-04 NOTE — PLAN OF CARE
Problem: Knowledge Deficit  Goal: Patient/family/caregiver demonstrates understanding of disease process, treatment plan, medications, and discharge instructions  Description: Complete learning assessment and assess knowledge base    Interventions:  - Provide teaching at level of understanding  - Provide teaching via preferred learning methods  Outcome: Progressing

## 2020-08-05 RX ORDER — UBIQUINOL 100 MG
CAPSULE ORAL
Refills: 5 | OUTPATIENT
Start: 2020-08-05

## 2020-08-06 ENCOUNTER — TELEPHONE (OUTPATIENT)
Dept: HEMATOLOGY ONCOLOGY | Facility: CLINIC | Age: 70
End: 2020-08-06

## 2020-08-06 LAB — INR PPP: 1.8 (ref 0.84–1.19)

## 2020-08-06 NOTE — TELEPHONE ENCOUNTER
Patient called to confirm their appointment  Appointment confirmed for [facility]                Appointment date and time: 08/07 at 10:30am               [place] location:  [facility]               Patient verbalized understanding of above

## 2020-08-07 ENCOUNTER — ANTICOAG VISIT (OUTPATIENT)
Dept: CARDIOLOGY CLINIC | Facility: CLINIC | Age: 70
End: 2020-08-07

## 2020-08-07 ENCOUNTER — OFFICE VISIT (OUTPATIENT)
Dept: HEMATOLOGY ONCOLOGY | Facility: HOSPITAL | Age: 70
End: 2020-08-07
Payer: COMMERCIAL

## 2020-08-07 VITALS
HEART RATE: 69 BPM | TEMPERATURE: 98.4 F | OXYGEN SATURATION: 97 % | HEIGHT: 74 IN | RESPIRATION RATE: 18 BRPM | BODY MASS INDEX: 40.43 KG/M2 | DIASTOLIC BLOOD PRESSURE: 76 MMHG | WEIGHT: 315 LBS | SYSTOLIC BLOOD PRESSURE: 120 MMHG

## 2020-08-07 DIAGNOSIS — C90.00 MULTIPLE MYELOMA NOT HAVING ACHIEVED REMISSION (HCC): Primary | ICD-10-CM

## 2020-08-07 DIAGNOSIS — I48.20 CHRONIC ATRIAL FIBRILLATION (HCC): ICD-10-CM

## 2020-08-07 DIAGNOSIS — L03.115 CELLULITIS OF RIGHT LOWER EXTREMITY: ICD-10-CM

## 2020-08-07 PROCEDURE — 1160F RVW MEDS BY RX/DR IN RCRD: CPT | Performed by: NURSE PRACTITIONER

## 2020-08-07 PROCEDURE — 99214 OFFICE O/P EST MOD 30 MIN: CPT | Performed by: NURSE PRACTITIONER

## 2020-08-07 PROCEDURE — 3052F HG A1C>EQUAL 8.0%<EQUAL 9.0%: CPT | Performed by: NURSE PRACTITIONER

## 2020-08-07 PROCEDURE — 3074F SYST BP LT 130 MM HG: CPT | Performed by: NURSE PRACTITIONER

## 2020-08-07 PROCEDURE — 3008F BODY MASS INDEX DOCD: CPT | Performed by: NURSE PRACTITIONER

## 2020-08-07 PROCEDURE — 3078F DIAST BP <80 MM HG: CPT | Performed by: NURSE PRACTITIONER

## 2020-08-07 PROCEDURE — 1111F DSCHRG MED/CURRENT MED MERGE: CPT | Performed by: NURSE PRACTITIONER

## 2020-08-07 PROCEDURE — 1036F TOBACCO NON-USER: CPT | Performed by: NURSE PRACTITIONER

## 2020-08-07 PROCEDURE — 4040F PNEUMOC VAC/ADMIN/RCVD: CPT | Performed by: NURSE PRACTITIONER

## 2020-08-07 PROCEDURE — 3066F NEPHROPATHY DOC TX: CPT | Performed by: NURSE PRACTITIONER

## 2020-08-07 RX ORDER — CEPHALEXIN 500 MG/1
500 CAPSULE ORAL EVERY 6 HOURS SCHEDULED
Qty: 20 CAPSULE | Refills: 0 | Status: SHIPPED | OUTPATIENT
Start: 2020-08-07 | End: 2020-08-07 | Stop reason: SDUPTHER

## 2020-08-07 RX ORDER — CEPHALEXIN 500 MG/1
500 CAPSULE ORAL EVERY 6 HOURS SCHEDULED
Qty: 40 CAPSULE | Refills: 0 | Status: SHIPPED | OUTPATIENT
Start: 2020-08-07 | End: 2020-08-17

## 2020-08-07 RX ORDER — DEXAMETHASONE 4 MG/1
20 TABLET ORAL ONCE
Status: CANCELLED
Start: 2020-09-28

## 2020-08-07 RX ORDER — DEXAMETHASONE 4 MG/1
20 TABLET ORAL ONCE
Status: CANCELLED
Start: 2020-10-05

## 2020-08-07 RX ORDER — DEXAMETHASONE 4 MG/1
20 TABLET ORAL ONCE
Status: CANCELLED
Start: 2020-10-19

## 2020-08-07 RX ORDER — DEXAMETHASONE 4 MG/1
20 TABLET ORAL ONCE
Status: CANCELLED
Start: 2020-10-12

## 2020-08-07 NOTE — PROGRESS NOTES
Tc to nursing dept at [facility], lm am, will fax warfarin 7 mg m/f, 5 5 mg all other days of the week, inr due 1 week   Also faxed to [facility] pharmacy

## 2020-08-10 ENCOUNTER — HOSPITAL ENCOUNTER (OUTPATIENT)
Dept: INFUSION CENTER | Facility: CLINIC | Age: 70
End: 2020-08-10

## 2020-08-10 ENCOUNTER — TELEPHONE (OUTPATIENT)
Dept: HEMATOLOGY ONCOLOGY | Facility: CLINIC | Age: 70
End: 2020-08-10

## 2020-08-10 NOTE — TELEPHONE ENCOUNTER
Patient is due to restart cycle on 8/24  We can restart him earlier if available  Can we please see if we can move up his next treatment date

## 2020-08-10 NOTE — TELEPHONE ENCOUNTER
Outcome of wound care appointment today the patient does not have an infection in his wound  Treatment plan for wound care is still being created  They should be getting orders today  Mary Kaye calling see due to patient not having and infection if his treatments will be started sooner than 8/31/20?      Reviewed in Epic:  8/14/20 Follow up appointment  8/20 CT scan  8/31/20 Infusion    Forwarded to Dr Chuck Pallas RN

## 2020-08-12 ENCOUNTER — EVALUATION (OUTPATIENT)
Dept: PHYSICAL THERAPY | Facility: REHABILITATION | Age: 70
End: 2020-08-12
Payer: COMMERCIAL

## 2020-08-12 DIAGNOSIS — Z74.09 MOBILITY IMPAIRED: Primary | ICD-10-CM

## 2020-08-12 PROCEDURE — 97163 PT EVAL HIGH COMPLEX 45 MIN: CPT | Performed by: PHYSICAL THERAPIST

## 2020-08-12 NOTE — LETTER
2020    Willi Edmond MD  [address] [facility]    Patient: Frances Salomon   YOB: 1950   Date of Visit: 2020     Encounter Diagnosis     ICD-10-CM    1  Mobility impaired  [id number]        Dear Dr Ariana Haider Recipients: Thank you for your recent referral of Frances Salomon  Please review the attached evaluation summary from Chinedu's recent visit  Please verify that you agree with the plan of care by signing the attached order  If you have any questions or concerns, please do not hesitate to call  I sincerely appreciate the opportunity to share in the care of one of your patients and hope to have another opportunity to work with you in the near future  Sincerely,    Debbie Weston, PT      Referring Provider:      I certify that I have read the below Plan of Care and certify the need for these services furnished under this plan of treatment while under my care  Willi Edmond, [address]: [phone]          PT Evaluation for mobility device    Name: Frances Salomon  Date: 20  : 1950  Referring Provider: No ref  provider found  AUTHORIZATION:   Insurance: Payor: [employer]  REP / Plan: [employer] [facility] REP / Product Type: Medicare HMO /     SUBJECTIVE:  HPI: Frances Salomon is a 81 y o  male referred to outpatient physical therapy for the following diagnosis   Encounter Diagnosis   Name Primary?  Mobility impaired Yes          Patient reports he came down with coronavirus 2020  He was hospitalized and received short-term rehab at [facility] for about 2 weeks  Prior to this infection, he was living at [facility]  He could get in and out of bed by himself, get on and off the toilet by himself, with some assistance for wiping  He'd have to stop and catch his breath    Following coronavirus, he now is transported by [employer] for his transfers  The left above knee amputation was during the summer of 2019  He had a prosthesis ordered, and was doing therapy, but came down with coronavirus  He was fitted for K1 prosthesis from [address]       The wound is healed up  Denies bleeding, denies redness or irritation  They haven't had to dress this for a good while  Prior to summer 2019, he was living at home in [facility], had a ramp to get into house  Hallways were narrow  He initially had a toe removed, then the leg in successive surgery  He couldn't afford to buy insulin  Over the past month, blood sugars have been under 200, mainly 120s and 130s  Height: 6 feet 3 inches  Weight: 320 lbs     Patient history includes:  Yes  Visual problems, corrected with glasses  No  History of falls, denies within last year  Yes  History of skin breakdown; had pressure sore on residual limb when wearing ; hasn't worn since that time  No  Problems of bowel or bladder; needs assistance wiping and now getting up from toilet  Yes  Pain, leg gives way  Yes  Use of assistive or mobility devices; railings, walker, wheelchair    Currently has a wide manual Drive wheelchair  The brakes don't hold, it's hard to roll, the back is breaking  He is using this chair most of the day  This chair is at least 11years old        Home Environment    Lives with:  Lives at [facility] set-up: [address] [place] [address] (no stairs to enter, lives on one floor)     Patient goals: get new wheelchair    Past Medical History:   Diagnosis Date    Cancer [facility])     CHF (congestive heart failure) [facility] [address] [facility]     Chronic kidney disease     COPD (chronic obstructive pulmonary disease) ([facility] [facility]     Decubitus ulcer of heel     LAST ASSESSED [id number]    Diabetes mellitus ([facility] [address] [facility] )     History of varicose veins     Hypertension     Intermittent claudication (HCC)     Neuropathy     Seasonal allergies    Medical history also includes: atrial fibrillation, left above knee amputation, acute kidney failure, myocardial infarction, acute respiratory infection,     Current Outpatient Medications:     acetaminophen (TYLENOL) 500 mg tablet, Take 1 tablet (500 mg total) by mouth every 6 (six) hours as needed for mild pain, headaches or fever, Disp: 90 tablet, Rfl: 1    albuterol (PROVENTIL HFA,VENTOLIN HFA) 90 mcg/act inhaler, Inhale 2 puffs every 6 (six) hours as needed for wheezing, Disp: 1 Inhaler, Rfl: 0    Alcohol Swabs (ALCOHOL PREP) 70 % PADS, , Disp: , Rfl: 0    allopurinol (ZYLOPRIM) 300 mg tablet, TAKE ONE TABLET BY MOUTH DAILY (Patient taking differently: Take 100 mg by mouth daily ), Disp: 30 tablet, Rfl: 5    ammonium lactate (LAC-HYDRIN) 12 % lotion, APPLY TO BLE TOPICALLY DAILY, Disp: 400 g, Rfl: 2    atorvastatin (LIPITOR) 40 mg tablet, Take 1 tablet (40 mg total) by mouth daily after dinner, Disp: 30 tablet, Rfl: 0    BD AUTOSHIELD DUO 30G X 5 MM MISC, USE AS DIRECTED WITH INSULIN FOUR TIMES A DAY, Disp: 100 each, Rfl: 3    BD INSULIN SYRINGE U/F 31G X 5/16" 0 5 ML MISC, USE AS DIRECTED WITH NOVOLIN 70/30, Disp: , Rfl: 0    BD PEN NEEDLE HILARIO U/F 32G X 4 MM MISC, by Other route 3 (three) times a day, Disp: 300 each, Rfl: 1    bisacodyl (bisacodyl) 5 mg EC tablet, Take 5 mg by mouth daily as needed for constipation, Disp: , Rfl:     bisacodyl (DULCOLAX) 10 mg suppository, Insert 10 mg into the rectum as needed for constipation, Disp: , Rfl:     bortezomib (VELCADE), Infuse into a venous catheter once, Disp: , Rfl:     cephalexin (KEFLEX) 500 mg capsule, Take 1 capsule (500 mg total) by mouth every 6 (six) hours for 10 days, Disp: 40 capsule, Rfl: 0    clotrimazole (LOTRIMIN) 1 % cream, APPLY TO BUTTOCK 2 TIMES DAILY, Disp: 45 g, Rfl: 3    Easy Touch Safety Lancets 28G MISC, USE 4 TIMES DAILY TO TEST BLOOD SUGAR, Disp: 100 each, Rfl: 5    fluticasone-salmeterol (ADVAIR DISKUS, WIXELA INHUB) 250-50 mcg/dose inhaler, Inhale 1 puff 2 (two) times a day Rinse mouth after use , Disp: 1 Inhaler, Rfl: 5    insulin glargine (LANTUS SOLOSTAR) 100 units/mL injection pen, Inject 22 Units under the skin daily, Disp: 5 pen, Rfl: 5    Insulin Pen Needle 31G X 5 MM MISC, by Does not apply route 2 (two) times a day for 50 days, Disp: 100 each, Rfl: 0    magnesium hydroxide (MILK OF MAGNESIA) 400 mg/5 mL oral suspension, Take 30 mL by mouth daily as needed for constipation, Disp: , Rfl:     Melatonin 5 MG TABS, TAKE ONE TABLET BY MOUTH EVERY NIGHT AT BEDTIME, Disp: 30 tablet, Rfl: 2    metFORMIN (GLUCOPHAGE) 1000 MG tablet, TAKE ONE TABLET BY MOUTH TWO TIMES A DAY, Disp: 60 tablet, Rfl: 2    metoprolol tartrate (LOPRESSOR) 25 mg tablet, Take 0 5 tablets (12 5 mg total) by mouth every 12 (twelve) hours, Disp: 30 tablet, Rfl: 0    NOVOLOG FLEXPEN 100 units/mL SOPN, INJ 5U BEFORE MEALS AND PER SS <250=NO CALL 250-300=5U,301-350= 10U,351-400=15U,401-450=20U>451 CALL FOR ORDERS UP TO 4X PER DAY, Disp: 15 mL, Rfl: 5    torsemide (DEMADEX) 10 mg tablet, TAKE 2 TABLETS BY MOUTH TWO TIMES A DAY, Disp: 120 tablet, Rfl: 2    triamcinolone (KENALOG) 0 1 % cream, APPLY TO THE FACE TWO TIMES A DAY, Disp: 30 g, Rfl: 2    warfarin (COUMADIN) 7 5 mg tablet, 7 5 mg on August 12th and 13th then INR on August 14th (Patient taking differently: Take 5 mg by mouth daily Takes 1 5 tabs at HS every Sunday, Tuesday, Thursday  Takes 2 tabs HS every Monday, Wednesday, Friday and Saturday), Disp: 30 tablet, Rfl: 0    Wound care instructions per physician include cleaning the right lower extremity wound, applying skin prep to periwound, adaptic to open area, followed by silver alginate, and wrap with Rubia  Patient reports he doesn't need to do this anymore  Currently uses arms and right foot on floor to propel  OBJECTIVE:  /54, 70 bpm     Reports blood sugar was 128 this morning  Not on oxygen at this point      Marked redness about the right lower leg, starting about 20% down the lower leg  He notes he needs a larger shoe  Series of scabbing, mild clear weaping  Moderate pitting edema  Patient notes his nurses are to use lotion on his lower leg each day  Significant loss of sensation about the right lower leg       5/5 dorsiflexion, 4/5 knee extension  4-/5 hip flexion  Range of motion within functional limits  Bilateral upper extremity range of motion within functional limits  4+/5 shoulder abduction  5-/5 elbow flexion  4/5 right elbow extension, 4-/5 left  4/5 left wrist flexion and extension, 5-/5 right  Patient notes history of strain in left arm  20 5 inches back of knee to floor (sneaker on); current chair has bottom of seat cushion at that height; patient notes he may make this taller to help with transfers  22 inches back of seat to back of knee; current chair has 3 5 inches from cushion to back of knee  23 5 inches wide (current chair, measured inside of arm rests)  28 5 inches seat to top of shoulder  17 inches chest width    Patient notes previously using slideboard and wheelchair where arm-rests rolled back  Current arm rests promote midline trunk positioning  Patient notes requiring assistance to go up any incline, but can travel over sidewalks that are level  Propels with both hands on wheels, also using the right foot to propel  Propels household distances, 92% SpO2 and 73 bpm after propelling 15 feet, turning 180 degrees, and then 15 feet to rest       Core Movement Tasks Description of task    Sitting Normal   Sitting to Standing Unable; tried from manual wheelchair to standing at parallel bars, pushing from arms of chair   Standing Unable   Walking Unable to walk   Step-up Unable   Reach, grasp, manipulate    Bed mobility and transfer      ASSESMENT:  Esperanza Rincon is a 81year old male with significant, chronic impairments in mobility requiring use of a manual wheelchair  He is dependent for transfers at this point    His current set-up is older than 11years old and does not function properly  He will benefit from use of a manual chair with the following modifications:  -seat depth allowing propelling with the right leg  -swing away arm rests to allow easier transfers  -stable back and seat to allow increased comfort with prolonged sitting  -foam cushion seat  Therapist will create letter of medical necessity  Additionally, Osman Donald wants to get moving with a prosthesis again  Prior to obtaining prosthesis, he was starting physical therapy and was working with Aman Agosto to obtain a prosthesis  Recommend that he starts wheeling himself around hallways and completing chair push-ups to start improving strength to benefit most from continuing physical therapy  Further referral needed: No      Precautions - diabetes, fall risk    Short-term/long-term goals:  1  Patient receives mobility device within 5 months  2  Patient demonstrates modified independence with navigating around clinic in mobility device in 5 months  3  Patient reports comfortable in mobility device in 5 months  PLAN:  Wheelchair fitting and instruction in use of mobility device  Follow-up as needed when mobility device is approved and delivered, instruction as needed  Up to 5 visits as needed over the next 12 months      Fam Carpio, PT  8/12/2020

## 2020-08-12 NOTE — PROGRESS NOTES
Hematology/Oncology Outpatient Follow- up Note  Alexsandra Chao 1950, [id number]  8/14/2020        Chief Complaint   Patient presents with    Follow-up       HPI:  Nguyen Antonio is a 80 yo male with multiple comorbidities including diabetes and hypertension with bone marrow biopsy proven multiple myeloma  He was referred to us in 8/2019 after 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  He was noted to have elevated kidney function and anemia which prompted work up for multiple myeloma  His skeletal survey was negative  He underwent bone marrow biopsy confirming multiple myeloma  He was initiated on treatment with with Velcade and Decadron weekly on a 35 day schedule   Velcade is 1 3 milligrams/meter squared and Decadron is 20 mg p o  On days 1, 8, 15, 22      Previous Hematologic/ Oncologic History:    Oncology History   [facility] myel[facility] ([facility] )   9/16/2019 Initial Diagnosis    Multiple myeloma not having achieved remission ([facility] )     9/24/2019 -  Chemotherapy    iron sucrose (VENOFER) injection 200 mg, 200 mg (100 % of original dose 200 mg), Intravenous, Once, 1 of 1 cycle  Dose modification: 200 mg (original dose 200 mg, Cycle 1, Reason: Other (See Comments))  Administration: 200 mg (9/24/2019)  bortezomib (VELCADE) subcutaneous injection 3 5 mg, 1 3 mg/m2 = 3 5 mg (81 3 % of original dose 1 6 mg/m2), Subcutaneous, Once, 7 of 12 cycles  Dose modification: 1 3 mg/m2 (original dose 1 6 mg/m2, Cycle 1, Reason: Dose Not Tolerated)  Administration: 3 5 mg (9/24/2019), 3 5 mg (10/1/2019), 3 5 mg (10/8/2019), 3 5 mg (10/15/2019), 3 5 mg (1/6/2020), 3 5 mg (1/13/2020), 3 5 mg (1/20/2020), 3 5 mg (1/27/2020), 3 5 mg (2/11/2020), 3 5 mg (2/17/2020), 3 5 mg (2/24/2020), 3 5 mg (3/2/2020), 3 5 mg (3/16/2020), 3 5 mg (3/23/2020), 3 5 mg (3/30/2020), 3 5 mg (4/6/2020), 3 5 mg (4/20/2020), 3 5 mg (4/27/2020), 3 5 mg (5/4/2020), 3 5 mg (5/11/2020), 3 5 mg (5/26/2020), 3 5 mg (6/1/2020), 3 5 mg (2020), 3 5 mg (6/15/2020), 3 5 mg (2020), 3 4 mg (2020)         Current Hematologic/ Oncologic Treatment:    Velcade and Decadron     ECO - Symptomatic, <50% confined to bed    Interval History:  The patient returns for a weekly follow up to assess his response to oral Keflex that started last week for right lower leg cellulitis  This appears improved today  He no longer has any weeping/purulent drainage noted  He continues to take his Keflex  Denies any fevers/chills  Recent myeloma labs completed on 2020 were reviewed and compared to previous labs completed in May  His SPEP is pending  His IgG is higher at 934, his kappa free light chain has also increased to 86 94 but his ratio remains stable and is slightly decreased at 1 92  His kidney function is normal      Pt reports he feels well and is at baseline today  Cancer Staging:  Cancer Staging  No matching staging information was found for the patient  Molecular Testing:         Test Results:    Imaging: No results found  Labs:   Lab Results   Component Value Date    WBC 7 21 2020    HGB 11 3 (L) 2020    HCT 37 6 2020    MCV 93 2020     2020     Lab Results   Component Value Date     01/15/2018    K 4 2 2020     2020    CO2 27 2020    ANIONGAP 11 2015    BUN 18 2020    CREATININE 1 30 2020    GLUCOSE 139 2015    GLUF 141 (H) 2019    CALCIUM 9 2 2020    AST 23 2020    ALT 19 2020    ALKPHOS 104 2020    PROT 7 1 01/15/2018    BILITOT 0 6 01/15/2018    EGFR 56 2020           Review of Systems   Constitutional: Positive for activity change  Musculoskeletal: Positive for gait problem  Skin: Positive for color change and wound (RLE, improved)           Active Problems:   Patient Active Problem List   Diagnosis    Diabetic foot ulcer [facility] )    Diabetes mellitus type 2, uncontrolled ([facility] )    Chronic venous hypertension, right    Essential hypertension    Chronic atrial fibrillation (HCC)    Morbid obesity (HCC)    Chronic diastolic congestive heart failure (HCC)    Cardiomyopathy (HCC)    Diabetes, polyneuropathy (HCC)    GERD (gastroesophageal reflux disease)    Hyperlipidemia    Onychomycosis    Gallstones    Localized edema    Peripheral vascular disease (HCC)    SOB (shortness of breath)    NALLELY (obstructive sleep apnea)    Moderate persistent asthma without complication    Multiple myeloma (HCC)    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)    Hiatal hernia    Weakness    Type 2 acute myocardial infarction (HCC)    Chronic obstructive pulmonary disease, unspecified (HCC)    Hypertensive chronic kidney disease w stg 1-4/unsp chr kdny    Mass of psoas muscle    CKD (chronic kidney disease)    Chronic diastolic (congestive) heart failure (HCC)    Lymphedema    Rash    Difficulty in walking, not elsewhere classified    Gout    Venous insufficiency (chronic) (peripheral)    Acute respiratory failure with hypoxia due to Pneumonia due to COVID-19 virus    Sepsis due to COVID-19 pneumonia [facility])       Past Medical History:   Past Medical History:   Diagnosis Date    [facility]     CHF (congestive heart failure) (HCC)     Chronic kidney disease     COPD (chronic obstructive pulmonary disease) (HCC)     Decubitus ulcer of heel     LAST ASSESSED [id number]    Diabetes mellitus ([facility] )     History of varicose veins     Hypertension     Intermittent claudication (HCC)     Neuropathy     Seasonal allergies        Surgical History:   Past Surgical History:   Procedure Laterality Date    AMPUTATION ABOVE KNEE (AKA) Left 7/31/2019    Procedure: AMPUTATION ABOVE KNEE (AKA);   Surgeon: Valente Deshpande MD;  Location:  MAIN OR;  Service: General    ANGIOPLASTY      W/ FLUOROSC ANGIOGRAPGY PERIPHERAL ARTERY ADDITIONAL  LAST ASSESSED [id number]    ANGIOPLASTY / STENTING FEMORAL      TANSCATH INTRAVASCULAR STENT PLACEMENT PERCUTANEOUS FEMORAL     COLONOSCOPY  2010    CT BONE MARROW BIOPSY AND ASPIRATION  8/9/2019    FULL THICKNESS SKIN GRAFT      TENDON REPAIR      TOE AMPUTATION Left 12/27/2018    Procedure: AMPUTATION left 4th TOE;  Surgeon: Nona Humphrey DPM;  Location:  MAIN OR;  Service: Podiatry       Family History:    Family History   Problem Relation Age of Onset    Other Mother         CARDIAC DISORDER     Diabetes Mother     Cancer Father     Other Family         CARDIAC DISORDER     Diabetes Family     Cancer Family     Mental illness Family     Kidney disease Sister     Diabetes Sister        Cancer-related family history includes Cancer in his family and father      Social History:   Social History     Socioeconomic History    Marital status:      Spouse name: Not on file    Number of children: 1    Years of education: Not on file    Highest education level: Not on file   Occupational History    Occupation: RETIRED   Social Needs    Financial resource strain: Not on file    Food insecurity     Worry: Not on file     Inability: Not on file   [employer] needs     Medical: Not on file     Non-medical: Not on file   Tobacco Use    Smoking status: Never Smoker    Smokeless tobacco: Never Used   Substance and Sexual Activity    Alcohol use: Not Currently    Drug use: Not Currently    Sexual activity: Not Currently   Lifestyle    Physical activity     Days per week: Not on file     Minutes per session: Not on file    Stress: Not on file   Relationships    Social connections     Talks on phone: Not on file     Gets together: Not on file     Attends [employer] service: Not on file     Active member of club or organization: Not on file     Attends meetings of clubs or organizations: Not on file     Relationship status: Not on file    Intimate partner violence     Fear of current or ex partner: Not on file     Emotionally abused: Not on file     Physically abused: Not on file     Forced sexual activity: Not on file   Other Topics Concern    Not on file   Social History Narrative    DENIED [address]    FEELS SAFE AT HOME    LIVES WITH FAMILY - SISTER    NO LIVING WILL    POA IN EXISTENCE     SUPPORTIVE AND SAFE    One son, 2 granddaughters       Current Medications:   Current Outpatient Medications   Medication Sig Dispense Refill    acetaminophen (TYLENOL) 500 mg tablet Take 1 tablet (500 mg total) by mouth every 6 (six) hours as needed for mild pain, headaches or fever 90 tablet 1    albuterol (PROVENTIL HFA,VENTOLIN HFA) 90 mcg/act inhaler Inhale 2 puffs every 6 (six) hours as needed for wheezing 1 Inhaler 0    Alcohol Swabs (ALCOHOL PREP) 70 % PADS   0    allopurinol (ZYLOPRIM) 300 mg tablet TAKE ONE TABLET BY MOUTH DAILY (Patient taking differently: Take 100 mg by mouth daily ) 30 tablet 5    ammonium lactate (LAC-HYDRIN) 12 % lotion APPLY TO BLE TOPICALLY DAILY 400 g 2    atorvastatin (LIPITOR) 40 mg tablet Take 1 tablet (40 mg total) by mouth daily after dinner 30 tablet 0    BD AUTOSHIELD DUO 30G X 5 MM MISC USE AS DIRECTED WITH INSULIN FOUR TIMES A  each 3    BD INSULIN SYRINGE U/F 31G X 5/16" 0 5 ML MISC USE AS DIRECTED WITH NOVOLIN 70/30  0    BD PEN NEEDLE HILARIO U/F 32G X 4 MM MISC by Other route 3 (three) times a day 300 each 1    bisacodyl (bisacodyl) 5 mg EC tablet Take 5 mg by mouth daily as needed for constipation      bisacodyl (DULCOLAX) 10 mg suppository Insert 10 mg into the rectum as needed for constipation      bortezomib (VELCADE) Infuse into a venous catheter once      cephalexin (KEFLEX) 500 mg capsule Take 1 capsule (500 mg total) by mouth every 6 (six) hours for 10 days 40 capsule 0    clotrimazole (LOTRIMIN) 1 % cream APPLY TO BUTTOCK 2 TIMES DAILY 45 g 3    Easy Touch Safety Lancets 28G MISC USE 4 TIMES DAILY TO TEST BLOOD SUGAR 100 each 5    fluticasone-salmeterol (ADVAIR DISKUS, WIXELA INHUB) 250-50 mcg/dose inhaler Inhale 1 puff 2 (two) times a day Rinse mouth after use  1 Inhaler 5    insulin glargine (LANTUS SOLOSTAR) 100 units/mL injection pen Inject 22 Units under the skin daily 5 pen 5    Insulin Pen Needle 31G X 5 MM MISC by Does not apply route 2 (two) times a day for 50 days 100 each 0    magnesium hydroxide (MILK OF MAGNESIA) 400 mg/5 mL oral suspension Take 30 mL by mouth daily as needed for constipation      Melatonin 5 MG TABS TAKE ONE TABLET BY MOUTH EVERY NIGHT AT BEDTIME 30 tablet 2    metFORMIN (GLUCOPHAGE) 1000 MG tablet TAKE ONE TABLET BY MOUTH TWO TIMES A DAY 60 tablet 2    metoprolol tartrate (LOPRESSOR) 25 mg tablet Take 0 5 tablets (12 5 mg total) by mouth every 12 (twelve) hours 30 tablet 0    NOVOLOG FLEXPEN 100 units/mL SOPN INJ 5U BEFORE MEALS AND PER SS <250=NO CALL 250-300=5U,301-350= 10U,351-400=15U,401-450=20U>451 CALL FOR ORDERS UP TO 4X PER DAY 15 mL 5    torsemide (DEMADEX) 10 mg tablet TAKE 2 TABLETS BY MOUTH TWO TIMES A  tablet 2    triamcinolone (KENALOG) 0 1 % cream APPLY TO THE FACE TWO TIMES A DAY 30 g 2    warfarin (COUMADIN) 7 5 mg tablet 7 5 mg on August 12th and 13th then INR on August 14th (Patient taking differently: Take 5 mg by mouth daily Takes 1 5 tabs at HS every Sunday, Tuesday, Thursday  Takes 2 tabs HS every Monday, Wednesday, Friday and Saturday) 30 tablet 0     No current facility-administered medications for this visit  Allergies: Allergies   Allergen Reactions    Latex Rash       Physical Exam:  BP 92/62 (BP Location: Right arm)   Pulse 67   Temp 98 6 °F (37 °C) (Tympanic)   Resp 16   SpO2 94%   There is no height or weight on file to calculate BSA      Wt Readings from Last 3 Encounters:   08/07/20 (!) 145 kg (320 lb)   07/27/20 (!) 141 kg (311 lb 11 7 oz)   07/20/20 (!) 141 kg (311 lb 11 7 oz)           Physical Exam  Constitutional:       Appearance: He is obese  HENT:      Head: Normocephalic and atraumatic  Mouth/Throat:      Mouth: Mucous membranes are moist       Pharynx: Oropharynx is clear  Eyes:      General: No scleral icterus  Right eye: No discharge  Left eye: No discharge  Cardiovascular:      Rate and Rhythm: Normal rate and regular rhythm  Pulmonary:      Effort: Pulmonary effort is normal       Breath sounds: Normal breath sounds  Abdominal:      General: Bowel sounds are normal       Palpations: Abdomen is soft  Musculoskeletal:      Comments: L AKA   Skin:     General: Skin is warm and dry  Findings: Erythema (RLE, improved  ) present  Neurological:      General: No focal deficit present  Mental Status: He is alert and oriented to person, place, and time  Psychiatric:         Mood and Affect: Mood normal          Behavior: Behavior normal          Assessment / Plan:    1  Multiple myeloma not having achieved remission [facility])      The patient is a very pleasant 82 yo male with multiple comorbidities including diabetes and hypertension with bone marrow biopsy proven multiple myeloma  He was referred to us in 8/2019 after 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  He was noted to have elevated kidney function and anemia which prompted work up for multiple myeloma  His skeletal survey was negative  He underwent bone marrow biopsy confirming multiple myeloma  He was initiated on treatment with with Velcade and Decadron weekly on a 35 day schedule  Velcade is 1 3 milligrams/meter squared and Decadron is 20 mg p o  On days 1, 8, 15, 22      Cycle 7 was delayed d/t his hospitalization  Day 15 of cycle 7 that was due on 8/3 was cancelled d/t right leg wound  Day 22 was cancelled d/t concern for cellulitis of his RLE and patient was referred to wound care clinic and started on Keflex  His cellulitis is improved  His myeloma counts are stable   He will continue with current regimen and resume treatment with cycle 8 on 8/20/20  He will return for follow up in one month with repeat blood work  Patient was in agreement with plan of care  He was instructed to call with any questions or concerns prior to his next office visit  Goals and Barriers:  Current Goal:  Prolong Survival from Multiple Myeloma  Barriers: None  Patient's Capacity to Self Care:  Patient is able to self care  Portions of the record may have been created with voice recognition software  Occasional wrong word or "sound a like" substitutions may have occurred due to the inherent limitations of voice recognition software  Read the chart carefully and recognize, using context, where substitutions have occurred

## 2020-08-12 NOTE — PROGRESS NOTES
PT Evaluation for mobility device    Name: Delisa Day  Date: 20  : 1950  Referring Provider: No ref  provider found  AUTHORIZATION:   Insurance: Payor: BRUNO  RE[place] / [place] [facility] [place] / Product Type: Medicare HMO /     SUBJECTIVE:  HPI: Delisa Day is a 81 y o  male referred to outpatient physical therapy for the following diagnosis   Encounter Diagnosis   Name Primary?  Mobility impaired Yes          Patient reports he came down with coronavirus 2020  He was hospitalized and received short-term rehab at [facility] for about 2 weeks  Prior to this infection, he was living at [facility]  He could get in and out of bed by himself, get on and off the toilet by himself, with some assistance for wiping  He'd have to stop and catch his breath  Following coronavirus, he now is transported by [facility] for his transfers  The left above knee amputation was during the summer of 2019  He had a prosthesis ordered, and was doing therapy, but came down with coronavirus  He was fitted for K1 prosthesis from [employer]       The wound is healed up  Denies bleeding, denies redness or irritation  They haven't had to dress this for a good while  Prior to summer 2019, he was living at home in [facility], had a ramp to get into house  Hallways were narrow  He initially had a toe removed, then the leg in successive surgery  He couldn't afford to buy insulin  Over the past month, blood sugars have been under 200, mainly 120s and 130s        Height: 6 feet 3 inches  Weight: 320 lbs     Patient history includes:  Yes  Visual problems, corrected with glasses  No  History of falls, denies within last year  Yes  History of skin breakdown; had pressure sore on residual limb when wearing ; hasn't worn since that time  No  Problems of bowel or bladder; needs assistance wiping and now getting up from toilet  Yes  Pain, leg gives way  Yes  Use of assistive or mobility devices; railings, walker, wheelchair    Currently has a wide manual Drive wheelchair  The brakes don't hold, it's hard to roll, the back is breaking  He is using this chair most of the day  This chair is at least 11years old        Home Environment    Lives with:  Lives at [facility] set-up: Walk-up (no stairs to enter, lives on one floor)     Patient goals: get new wheelchair    Past Medical History:   Diagnosis Date    Cancer [facility])     CH[facility]est[facility] failure[facility]     Chronic kidney disease     COPD (chronic obstructive pulmonary disease) ([facility] )     Decubitus ulcer of heel     LAST ASSESSED [id number]    Diabetes mellitus ([place] [address] )     History of varicose veins     Hypertension     Intermittent claudication (HCC)     Neuropathy     Seasonal allergies    Medical history also includes: atrial fibrillation, left above knee amputation, acute kidney failure, myocardial infarction, acute respiratory infection,     Current Outpatient Medications:     acetaminophen (TYLENOL) 500 mg tablet, Take 1 tablet (500 mg total) by mouth every 6 (six) hours as needed for mild pain, headaches or fever, Disp: 90 tablet, Rfl: 1    albuterol (PROVENTIL HFA,VENTOLIN HFA) 90 mcg/act inhaler, Inhale 2 puffs every 6 (six) hours as needed for wheezing, Disp: 1 Inhaler, Rfl: 0    Alcohol Swabs (ALCOHOL PREP) 70 % PADS, , Disp: , Rfl: 0    allopurinol (ZYLOPRIM) 300 mg tablet, TAKE ONE TABLET BY MOUTH DAILY (Patient taking differently: Take 100 mg by mouth daily ), Disp: 30 tablet, Rfl: 5    ammonium lactate (LAC-HYDRIN) 12 % lotion, APPLY TO BLE TOPICALLY DAILY, Disp: 400 g, Rfl: 2    atorvastatin (LIPITOR) 40 mg tablet, Take 1 tablet (40 mg total) by mouth daily after dinner, Disp: 30 tablet, Rfl: 0    BD AUTOSHIELD DUO 30G X 5 MM MISC, USE AS DIRECTED WITH INSULIN FOUR TIMES A DAY, Disp: 100 each, Rfl: 3    BD INSULIN SYRINGE U/F 31G X 5/16" 0 5 ML MISC, USE AS DIRECTED WITH NOVOLIN 70/30, Disp: , Rfl: 0    BD PEN NEEDLE HILARIO U/F 32G X 4 MM MISC, by Other route 3 (three) times a day, Disp: 300 each, Rfl: 1    bisacodyl (bisacodyl) 5 mg EC tablet, Take 5 mg by mouth daily as needed for constipation, Disp: , Rfl:     bisacodyl (DULCOLAX) 10 mg suppository, Insert 10 mg into the rectum as needed for constipation, Disp: , Rfl:     bortezomib (VELCADE), Infuse into a venous catheter once, Disp: , Rfl:     cephalexin (KEFLEX) 500 mg capsule, Take 1 capsule (500 mg total) by mouth every 6 (six) hours for 10 days, Disp: 40 capsule, Rfl: 0    clotrimazole (LOTRIMIN) 1 % cream, APPLY TO BUTTOCK 2 TIMES DAILY, Disp: 45 g, Rfl: 3    Easy Touch Safety Lancets 28G MISC, USE 4 TIMES DAILY TO TEST BLOOD SUGAR, Disp: 100 each, Rfl: 5    fluticasone-salmeterol (ADVAIR DISKUS, WIXELA INHUB) 250-50 mcg/dose inhaler, Inhale 1 puff 2 (two) times a day Rinse mouth after use , Disp: 1 Inhaler, Rfl: 5    insulin glargine (LANTUS SOLOSTAR) 100 units/mL injection pen, Inject 22 Units under the skin daily, Disp: 5 pen, Rfl: 5    Insulin Pen Needle 31G X 5 MM MISC, by Does not apply route 2 (two) times a day for 50 days, Disp: 100 each, Rfl: 0    magnesium hydroxide (MILK OF MAGNESIA) 400 mg/5 mL oral suspension, Take 30 mL by mouth daily as needed for constipation, Disp: , Rfl:     Melatonin 5 MG TABS, TAKE ONE TABLET BY MOUTH EVERY NIGHT AT BEDTIME, Disp: 30 tablet, Rfl: 2    metFORMIN (GLUCOPHAGE) 1000 MG tablet, TAKE ONE TABLET BY MOUTH TWO TIMES A DAY, Disp: 60 tablet, Rfl: 2    metoprolol tartrate (LOPRESSOR) 25 mg tablet, Take 0 5 tablets (12 5 mg total) by mouth every 12 (twelve) hours, Disp: 30 tablet, Rfl: 0    NOVOLOG FLEXPEN 100 units/mL SOPN, INJ 5U BEFORE MEALS AND PER SS <250=NO CALL 250-300=5U,301-350= 10U,351-400=15U,401-450=20U>451 CALL FOR ORDERS UP TO 4X PER DAY, Disp: 15 mL, Rfl: 5    torsemide (DEMADEX) 10 mg tablet, TAKE 2 TABLETS BY MOUTH TWO TIMES A DAY, Disp: 120 tablet, Rfl: 2    triamcinolone (KENALOG) 0 1 % cream, APPLY TO THE FACE TWO TIMES A DAY, Disp: 30 g, Rfl: 2    warfarin (COUMADIN) 7 5 mg tablet, 7 5 mg on August 12th and 13th then INR on August 14th (Patient taking differently: Take 5 mg by mouth daily Takes 1 5 tabs at HS every Sunday, Tuesday, Thursday  Takes 2 tabs HS every Monday, Wednesday, Friday and Saturday), Disp: 30 tablet, Rfl: 0    Wound care instructions per physician include cleaning the right lower extremity wound, applying skin prep to periwound, adaptic to open area, followed by silver alginate, and wrap with Rubia  Patient reports he doesn't need to do this anymore  Currently uses arms and right foot on floor to propel  OBJECTIVE:  /54, 70 bpm     Reports blood sugar was 128 this morning  Not on oxygen at this point  Marked redness about the right lower leg, starting about 20% down the lower leg  He notes he needs a larger shoe  Series of scabbing, mild clear weaping  Moderate pitting edema  Patient notes his nurses are to use lotion on his lower leg each day  Significant loss of sensation about the right lower leg       5/5 dorsiflexion, 4/5 knee extension  4-/5 hip flexion  Range of motion within functional limits  Bilateral upper extremity range of motion within functional limits  4+/5 shoulder abduction  5-/5 elbow flexion  4/5 right elbow extension, 4-/5 left  4/5 left wrist flexion and extension, 5-/5 right  Patient notes history of strain in left arm        20 5 inches back of knee to floor (sneaker on); current chair has bottom of seat cushion at that height; patient notes he may make this taller to help with transfers  22 inches back of seat to back of knee; current chair has 3 5 inches from cushion to back of knee  23 5 inches wide (current chair, measured inside of arm rests)  28 5 inches seat to top of shoulder  17 inches chest width    Patient notes previously using slideboard and wheelchair where arm-rests rolled back  Current arm rests promote midline trunk positioning  Patient notes requiring assistance to go up any incline, but can travel over sidewalks that are level  Propels with both hands on wheels, also using the right foot to propel  Propels household distances, 92% SpO2 and 73 bpm after propelling 15 feet, turning 180 degrees, and then 15 feet to rest       Core Movement Tasks Description of task    Sitting Normal   Sitting to Standing Unable; tried from manual wheelchair to standing at parallel bars, pushing from arms of chair   Standing Unable   Walking Unable to walk   Step-up Unable   Reach, grasp, manipulate    Bed mobility and transfer      ASSESMENT:  Jackelin Marsh is a 81year old male with significant, chronic impairments in mobility requiring use of a manual wheelchair  He is dependent for transfers at this point  His current set-up is older than 11years old and does not function properly  He will benefit from use of a manual chair with the following modifications:  -seat depth allowing propelling with the right leg  -swing away arm rests to allow easier transfers  -stable back and seat to allow increased comfort with prolonged sitting  -foam cushion seat  Therapist will create letter of medical necessity  Additionally, Jackelin Marsh wants to get moving with a prosthesis again  Prior to obtaining prosthesis, he was starting physical therapy and was working with Mustapha Townsend to obtain a prosthesis  Recommend that he starts wheeling himself around hallways and completing chair push-ups to start improving strength to benefit most from continuing physical therapy  Further referral needed: No      Precautions - diabetes, fall risk    Short-term/long-term goals:  1  Patient receives mobility device within 5 months  2  Patient demonstrates modified independence with navigating around clinic in mobility device in 5 months    3  Patient reports comfortable in mobility device in 5 months  PLAN:  Wheelchair fitting and instruction in use of mobility device  Follow-up as needed when mobility device is approved and delivered, instruction as needed  Up to 5 visits as needed over the next 12 months  Danielle Anaya, PT  8/12/2020    Addendum 9/10/2020:  Vidya Philippe letter of medical necessity to [facility] 9/09/2020

## 2020-08-13 ENCOUNTER — TELEPHONE (OUTPATIENT)
Dept: HEMATOLOGY ONCOLOGY | Facility: CLINIC | Age: 70
End: 2020-08-13

## 2020-08-13 ENCOUNTER — TRANSCRIBE ORDERS (OUTPATIENT)
Dept: PHYSICAL THERAPY | Facility: REHABILITATION | Age: 70
End: 2020-08-13

## 2020-08-13 ENCOUNTER — ANTICOAG VISIT (OUTPATIENT)
Dept: CARDIOLOGY CLINIC | Facility: CLINIC | Age: 70
End: 2020-08-13

## 2020-08-13 DIAGNOSIS — Z74.09 REDUCED MOBILITY: Primary | ICD-10-CM

## 2020-08-13 DIAGNOSIS — I48.20 CHRONIC ATRIAL FIBRILLATION (HCC): ICD-10-CM

## 2020-08-13 LAB — INR PPP: 1.7 (ref 0.84–1.19)

## 2020-08-13 NOTE — TELEPHONE ENCOUNTER
Appointment Confirmation     Appointment with  Elise Tay   Appointment date & time 8/14 9:30 am    Location [place]    Patient verbilized Understanding  Yes

## 2020-08-14 ENCOUNTER — TELEPHONE (OUTPATIENT)
Dept: HEMATOLOGY ONCOLOGY | Facility: HOSPITAL | Age: 70
End: 2020-08-14

## 2020-08-14 ENCOUNTER — TELEPHONE (OUTPATIENT)
Dept: HEMATOLOGY ONCOLOGY | Facility: CLINIC | Age: 70
End: 2020-08-14

## 2020-08-14 ENCOUNTER — OFFICE VISIT (OUTPATIENT)
Dept: HEMATOLOGY ONCOLOGY | Facility: HOSPITAL | Age: 70
End: 2020-08-14
Payer: COMMERCIAL

## 2020-08-14 VITALS
OXYGEN SATURATION: 94 % | TEMPERATURE: 98.6 F | HEART RATE: 67 BPM | RESPIRATION RATE: 16 BRPM | DIASTOLIC BLOOD PRESSURE: 62 MMHG | SYSTOLIC BLOOD PRESSURE: 92 MMHG

## 2020-08-14 DIAGNOSIS — C90.00 MULTIPLE MYELOMA NOT HAVING ACHIEVED REMISSION (HCC): Primary | Chronic | ICD-10-CM

## 2020-08-14 PROCEDURE — 3078F DIAST BP <80 MM HG: CPT | Performed by: NURSE PRACTITIONER

## 2020-08-14 PROCEDURE — 1111F DSCHRG MED/CURRENT MED MERGE: CPT | Performed by: NURSE PRACTITIONER

## 2020-08-14 PROCEDURE — 3052F HG A1C>EQUAL 8.0%<EQUAL 9.0%: CPT | Performed by: NURSE PRACTITIONER

## 2020-08-14 PROCEDURE — 4040F PNEUMOC VAC/ADMIN/RCVD: CPT | Performed by: NURSE PRACTITIONER

## 2020-08-14 PROCEDURE — 1160F RVW MEDS BY RX/DR IN RCRD: CPT | Performed by: NURSE PRACTITIONER

## 2020-08-14 PROCEDURE — 1036F TOBACCO NON-USER: CPT | Performed by: NURSE PRACTITIONER

## 2020-08-14 PROCEDURE — 3066F NEPHROPATHY DOC TX: CPT | Performed by: NURSE PRACTITIONER

## 2020-08-14 PROCEDURE — 99214 OFFICE O/P EST MOD 30 MIN: CPT | Performed by: NURSE PRACTITIONER

## 2020-08-14 PROCEDURE — 3074F SYST BP LT 130 MM HG: CPT | Performed by: NURSE PRACTITIONER

## 2020-08-14 NOTE — TELEPHONE ENCOUNTER
Returned Anitha's call regarding patients schedule  I was unable to reach anyone so I left a voicemail at the receptionists line requesting a return call on Monday to review his upcoming appointments    I did indicate there was nothing scheduled until [email or web address]

## 2020-08-14 NOTE — TELEPHONE ENCOUNTER
Jair from patient's home facility is requesting for an update calendar to be faxed over to [phone]    Information given to Michell Bloom and he has faxed it over

## 2020-08-17 ENCOUNTER — TELEPHONE (OUTPATIENT)
Dept: HEMATOLOGY ONCOLOGY | Facility: MEDICAL CENTER | Age: 70
End: 2020-08-17

## 2020-08-17 NOTE — TELEPHONE ENCOUNTER
Celsa Gloria called in from [facility] as she would like to discuss the infusion scheduling as it did not coincide with her schedule, please call Celsa Gloria [phone] fax [phone]

## 2020-08-18 DIAGNOSIS — R60.0 LOCALIZED EDEMA: ICD-10-CM

## 2020-08-18 RX ORDER — TORSEMIDE 10 MG/1
TABLET ORAL
Qty: 120 TABLET | Refills: 2 | OUTPATIENT
Start: 2020-08-18

## 2020-08-18 NOTE — TELEPHONE ENCOUNTER
Simona Brooks from [facility] returned call to Kourtney Carrasco, can be reached at [phone]

## 2020-08-19 ENCOUNTER — TELEPHONE (OUTPATIENT)
Dept: HEMATOLOGY ONCOLOGY | Facility: CLINIC | Age: 70
End: 2020-08-19

## 2020-08-19 NOTE — TELEPHONE ENCOUNTER
Several attempts to fax calendar to Jair at [facility] yesterday with no success  Called today and was given an alternate fax number still unable to go through  Scanned and emailed to the  at [email or web address]

## 2020-08-19 NOTE — TELEPHONE ENCOUNTER
I attempted faxing 2x yesterday and twice again today  Each time receiving either a busy response or no response    I called and emailed the calendar to the  at hospitals

## 2020-08-19 NOTE — TELEPHONE ENCOUNTER
I did call the facility again to confirm receipt as I then emailed the calendar to the    Ernesto Carmona confirmed she did in fact receive it and was going to hand deliver to Jair

## 2020-08-19 NOTE — TELEPHONE ENCOUNTER
Wes Ceron from patients nursing [place] is calling in requesting patient's new appt list to be faxed over to [phone]

## 2020-08-20 ENCOUNTER — HOSPITAL ENCOUNTER (OUTPATIENT)
Dept: INFUSION CENTER | Facility: HOSPITAL | Age: 70
Discharge: HOME | End: 2020-08-20
Attending: INTERNAL MEDICINE

## 2020-08-20 ENCOUNTER — ANTICOAG VISIT (OUTPATIENT)
Dept: CARDIOLOGY CLINIC | Facility: CLINIC | Age: 70
End: 2020-08-20

## 2020-08-20 ENCOUNTER — HOSPITAL ENCOUNTER (OUTPATIENT)
Dept: CT IMAGING | Facility: HOSPITAL | Age: 70
Discharge: HOME | End: 2020-08-20
Attending: INTERNAL MEDICINE
Payer: COMMERCIAL

## 2020-08-20 DIAGNOSIS — I48.20 CHRONIC ATRIAL FIBRILLATION (HCC): ICD-10-CM

## 2020-08-20 DIAGNOSIS — R93.89 ABNORMAL CHEST CT: ICD-10-CM

## 2020-08-20 LAB — INR PPP: 2.4 (ref 0.84–1.19)

## 2020-08-20 PROCEDURE — 71250 CT THORAX DX C-: CPT

## 2020-08-20 NOTE — PROGRESS NOTES
Left message at [facility] with INR of 2 4  Advised to stay on same dose of coumadin and have another INR in two weeks   9/3/20   Faxed to [address] [place] [address] and pharmacy  [phone]

## 2020-08-24 ENCOUNTER — HOSPITAL ENCOUNTER (OUTPATIENT)
Dept: INFUSION CENTER | Facility: HOSPITAL | Age: 70
Discharge: HOME | End: 2020-08-24
Attending: INTERNAL MEDICINE
Payer: COMMERCIAL

## 2020-08-24 ENCOUNTER — TELEPHONE (OUTPATIENT)
Dept: HEMATOLOGY ONCOLOGY | Facility: CLINIC | Age: 70
End: 2020-08-24

## 2020-08-24 VITALS
SYSTOLIC BLOOD PRESSURE: 124 MMHG | OXYGEN SATURATION: 92 % | WEIGHT: 315 LBS | TEMPERATURE: 98 F | RESPIRATION RATE: 20 BRPM | BODY MASS INDEX: 40.43 KG/M2 | HEART RATE: 68 BPM | HEIGHT: 74 IN | DIASTOLIC BLOOD PRESSURE: 61 MMHG

## 2020-08-24 DIAGNOSIS — C90.00 MULTIPLE MYELOMA NOT HAVING ACHIEVED REMISSION (HCC): Primary | ICD-10-CM

## 2020-08-24 PROCEDURE — 96401 CHEMO ANTI-NEOPL SQ/IM: CPT

## 2020-08-24 RX ORDER — DEXAMETHASONE 4 MG/1
20 TABLET ORAL ONCE
Status: COMPLETED | OUTPATIENT
Start: 2020-08-24 | End: 2020-08-24

## 2020-08-24 RX ADMIN — DEXAMETHASONE 20 MG: 4 TABLET ORAL at 11:43

## 2020-08-24 RX ADMIN — BORTEZOMIB 3.4 MG: 3.5 INJECTION, POWDER, LYOPHILIZED, FOR SOLUTION INTRAVENOUS; SUBCUTANEOUS at 11:56

## 2020-08-24 NOTE — TELEPHONE ENCOUNTER
Appointment Confirmation     Appointment with Infusion    Appointment date & time  08/24 at 11:30am    Location [place]   Patient verbilized Understanding  Yes

## 2020-08-30 ENCOUNTER — APPOINTMENT (EMERGENCY)
Dept: RADIOLOGY | Facility: HOSPITAL | Age: 70
End: 2020-08-30
Payer: COMMERCIAL

## 2020-08-30 ENCOUNTER — HOSPITAL ENCOUNTER (EMERGENCY)
Facility: HOSPITAL | Age: 70
Discharge: NURSING, REHAB OR LONG-TERM CARE | End: 2020-08-30
Attending: SURGERY | Admitting: SURGERY
Payer: COMMERCIAL

## 2020-08-30 VITALS
SYSTOLIC BLOOD PRESSURE: 144 MMHG | DIASTOLIC BLOOD PRESSURE: 69 MMHG | HEART RATE: 64 BPM | OXYGEN SATURATION: 93 % | TEMPERATURE: 97.1 F | RESPIRATION RATE: 20 BRPM | WEIGHT: 315 LBS
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
## 2020-08-30 LAB
BASE EXCESS BLDA CALC-SCNC: 8 MMOL/L (ref -2–3)
CA-I BLD-SCNC: 1.05 MMOL/L (ref 1.12–1.32)
GLUCOSE SERPL-MCNC: 195 MG/DL (ref 65–140)
HCO3 BLDA-SCNC: 31.2 MMOL/L (ref 24–30)
HCT VFR BLD CALC: 39 % (ref 36.5–49.3)
HGB BLDA-MCNC: 13.3 G/DL (ref 12–17)
PCO2 BLD: 32 MMOL/L (ref 21–32)
PCO2 BLD: 37.1 MM HG (ref 42–50)
PH BLD: 7.53 [PH] (ref 7.3–7.4)
PO2 BLD: 46 MM HG (ref 35–45)
POTASSIUM BLD-SCNC: 5.5 MMOL/L (ref 3.5–5.3)
SAO2 % BLD FROM PO2: 86 % (ref 60–85)
SODIUM BLD-SCNC: 139 MMOL/L (ref 136–145)
SPECIMEN SOURCE: ABNORMAL

## 2020-08-30 PROCEDURE — 84132 ASSAY OF SERUM POTASSIUM: CPT

## 2020-08-30 PROCEDURE — 99282 EMERGENCY DEPT VISIT SF MDM: CPT | Performed by: SURGERY

## 2020-08-30 PROCEDURE — 85014 HEMATOCRIT: CPT

## 2020-08-30 PROCEDURE — 99284 EMERGENCY DEPT VISIT MOD MDM: CPT

## 2020-08-30 PROCEDURE — 72125 CT NECK SPINE W/O DYE: CPT

## 2020-08-30 PROCEDURE — 84295 ASSAY OF SERUM SODIUM: CPT

## 2020-08-30 PROCEDURE — 82803 BLOOD GASES ANY COMBINATION: CPT

## 2020-08-30 PROCEDURE — 82330 ASSAY OF CALCIUM: CPT

## 2020-08-30 PROCEDURE — 70450 CT HEAD/BRAIN W/O DYE: CPT

## 2020-08-30 PROCEDURE — 82947 ASSAY GLUCOSE BLOOD QUANT: CPT

## 2020-08-30 PROCEDURE — 72100 X-RAY EXAM L-S SPINE 2/3 VWS: CPT

## 2020-08-30 RX ORDER — GINSENG 100 MG
1 CAPSULE ORAL ONCE
Status: COMPLETED | OUTPATIENT
Start: 2020-08-30 | End: 2020-08-30

## 2020-08-30 RX ADMIN — BACITRACIN 1 SMALL APPLICATION: 500 OINTMENT TOPICAL at 18:35

## 2020-08-30 NOTE — PROGRESS NOTES
Pastoral Care Progress Note    2020  Patient: Kanu Fields : 1950  Admission Date & Time: 2020 1608  MRN: [id number] CSN: [id number]                     Chaplaincy Interventions Utilized:   Empowerment: Provided chaplaincy education    Collaboration: Consulted with interdisciplinary team    Relationship Building: Cultivated a relationship of care and support         20 4090   Spiritual Beliefs/Perceptions   Support Systems Children   Stress Factors   Patient Stress Factors None identified   Coping Responses   Patient Coping Open/discussion; Accepting   Plan of Care   Assessment Completed by: Unit visit

## 2020-08-30 NOTE — DISCHARGE INSTRUCTIONS
Fall Prevention   WHAT YOU NEED TO KNOW:   Fall prevention includes ways to make your home and other areas safer  It also includes ways you can move more carefully to prevent a fall  Health conditions that cause changes in your blood pressure, vision, or muscle strength and coordination may increase your risk for falls  Medicines may also increase your risk for falls if they make you dizzy, weak, or sleepy  DISCHARGE INSTRUCTIONS:   Call 911 or have someone else call if:   · You have fallen and are unconscious  · You have fallen and cannot move part of your body  Contact your healthcare provider if:   · You have fallen and have pain or a headache  · You have questions or concerns about your condition or care  Fall prevention tips:   · Stand or sit up slowly  This may help you keep your balance and prevent falls  · Use assistive devices as directed  Your healthcare provider may suggest that you use a cane or walker to help you keep your balance  You may need to have grab bars put in your bathroom near the toilet or in the shower  · Wear shoes that fit well and have soles that   Wear shoes both inside and outside  Use slippers with good   Do not wear shoes with high heels  · Wear a personal alarm  This is a device that allows you to call 911 if you fall and need help  Ask your healthcare provider for more information  · Stay active  Exercise can help strengthen your muscles and improve your balance  Your healthcare provider may recommend water aerobics or walking  He or she may also recommend physical therapy to improve your coordination  Never start an exercise program without talking to your healthcare provider first      · Manage your medical conditions  Keep all appointments with your healthcare providers  Visit your eye doctor as directed  Home safety tips:   · Add items to prevent falls in the bathroom    Put nonslip strips on your bath or shower floor to prevent you from slipping  Use a bath mat if you do not have carpet in the bathroom  This will prevent you from falling when you step out of the bath or shower  Use a shower seat so you do not need to stand while you shower  Sit on the toilet or a chair in your bathroom to dry yourself and put on clothing  This will prevent you from losing your balance from drying or dressing yourself while you are standing  · Keep paths clear  Remove books, shoes, and other objects from walkways and stairs  Place cords for telephones and lamps out of the way so that you do not need to walk over them  Tape them down if you cannot move them  Remove small rugs  If you cannot remove a rug, secure it with double-sided tape  This will prevent you from tripping  · Install bright lights in your home  Use night lights to help light paths to the bathroom or kitchen  Always turn on the light before you start walking  · Keep items you use often on shelves within reach  Do not use a step stool to help you reach an item  · Paint or place reflective tape on the edges of your stairs  This will help you see the stairs better  Follow up with your healthcare provider as directed:  Write down your questions so you remember to ask them during your visits  © 2017 [employer] Information is for End User's use only and may not be sold, redistributed or otherwise used for commercial purposes  All illustrations and images included in CareNotes® are the copyrighted property of A D A M , Inc  or [employer]  The above information is an  only  It is not intended as medical advice for individual conditions or treatments  Talk to your doctor, nurse or pharmacist before following any medical regimen to see if it is safe and effective for you

## 2020-08-30 NOTE — H&P
H&P Exam - Trauma   Dominick Harvey 81 y o  male MRN: [id number]  Unit/Bed#: ED 25 Encounter: [id number]    Assessment/Plan   Trauma Alert: Level B  Model of Arrival: Ambulance  Trauma Team: Attending Giuliano Garcia and CLEMENTE Michelle  Consultants: None    Trauma Active Problems/Plan:     S/p Fall from bed on Coumadin  -CTH and C spine without traumatic injuries  -XR lumbar spine Negative  -patient at baseline, GCS 15 with no complaints  -stable for discharge back to [facility]    L forehead abrasion  -local wound care  -bacitracin    L eye ecchymosis  -apply ice    Cervical Collar Clearance: The patient had a CT scan of the cervical spine demonstrating no acute injury  On exam, the patient had no midline point tenderness or paresthesias/numbness/weakness in the extremities  The patient had full range of motion (was then able to flex, extend, and rotate head laterally) without pain  There were no distracting injuries and the patient was not intoxicated  The patient's cervical spine was cleared radiologically and clinically  Cervical collar removed at this time  Valdene Cabot, PA-C  8/30/2020 5:51 PM       Chief Complaint: fall    History of Present Illness   HPI:  Dominick Harvey is a 81 y o  male who presents follow-up of bed  States that he was sitting in bed started to fall asleep and then follow over  Positive head strike no loss consciousness  He does take Coumadin  GCS 15 on initial arrival to [facility] with no complaints  Mechanism:Fall    Review of Systems   HENT:        Left eye swelling and ecchymosis   Respiratory: Negative  Cardiovascular: Negative  Gastrointestinal: Negative  Genitourinary: Negative  Musculoskeletal: Negative  Skin:        Abrasion to L forehead   Neurological: Negative  12-point, complete review of systems was reviewed and negative except as stated above  Historical Information   History is unobtainable from the patient due to nothing    Efforts to obtain history included the following sources: family member    No past medical history on file  No past surgical history on file  Social History   Social History     Substance and Sexual Activity   Alcohol Use Not on file     Social History     Substance and Sexual Activity   Drug Use Not on file     Social History     Tobacco Use   Smoking Status Not on file     No existing history information found  No existing history information found  There is no immunization history on file for this patient  Last Tetanus: uptodate  Family History: Non-contributory  Unable to obtain/limited by none      Meds/Allergies   all current active meds have been reviewed    Allergies not on file      PHYSICAL EXAM    PE limited by: nothing    Objective   Vitals:   First set: Temperature: (!) 97 1 °F (36 2 °C) (08/30/20 1610)  Pulse: 97 (08/30/20 1610)  Respirations: 18 (08/30/20 1610)  Blood Pressure: (!) 177/88 (08/30/20 1610)    Primary Survey:   (A) Airway: intact  (B) Breathing: equal breath sounds bilaterally  (C) Circulation: Pulses:   normal  (D) Disabliity:  GCS Total:  15  (E) Expose:  Completed    Secondary Survey: (Click on Physical Exam tab above)  Physical Exam  HENT:      Head:      Comments: Abrasion to L forehead  Eyes:      Extraocular Movements: Extraocular movements intact  Comments: L eye ecchymosis  PERRLA 3mm bilaterally  No bony tenderness to orbit   Neck:      Musculoskeletal: Neck supple  Comments: C collar placed  Cardiovascular:      Rate and Rhythm: Normal rate and regular rhythm  Pulses: Normal pulses  Heart sounds: No murmur  No friction rub  No gallop  Pulmonary:      Effort: Pulmonary effort is normal       Breath sounds: Normal breath sounds  No wheezing, rhonchi or rales  Abdominal:      General: Bowel sounds are normal  There is no distension  Palpations: Abdomen is soft  Musculoskeletal: Normal range of motion  General: No swelling or tenderness  Comments: L AKA noted   Skin:     General: Skin is warm and dry  Capillary Refill: Capillary refill takes less than 2 seconds  Comments: Old ecchymosis noted on L chest and L shoulder patient states this is from physical therapy   Neurological:      General: No focal deficit present  Mental Status: He is alert and oriented to person, place, and time  Psychiatric:         Mood and Affect: Mood normal          Behavior: Behavior normal          Invasive Devices     None                 Lab Results: ISTAT: No components found for: VBG  Imaging/EKG Studies: Results: I have personally reviewed pertinent reports  and I have personally reviewed pertinent films in PACS, FAST: negative   TRAUMA - CT head wo contrast   Final Result by Obey Day MD (08/30 1643)      No acute intracranial abnormality  I personally discussed this study with Fredis Boss on 8/30/2020 at 4:43 PM                Workstation performed: [id number]         TRAUMA - CT spine cervical wo contrast   Final Result by Obey Day MD (08/30 1643)      No cervical spine fracture or traumatic malalignment               I personally discussed this study with Fredis Boss on 8/30/2020 at 4:43 PM                       Workstation performed: [id number]         XR Trauma multiple    (Results Pending)   XR chest 1 view    (Results Pending)   XR spine lumbar 2 or 3 views injury    (Results Pending)         Other Studies: none    Code Status: No Order  Advance Directive and Living Will:      Power of :    POLST:

## 2020-08-31 ENCOUNTER — HOSPITAL ENCOUNTER (OUTPATIENT)
Dept: INFUSION CENTER | Facility: HOSPITAL | Age: 70
Discharge: HOME | End: 2020-08-31
Attending: INTERNAL MEDICINE
Payer: COMMERCIAL

## 2020-08-31 VITALS
WEIGHT: 315 LBS | SYSTOLIC BLOOD PRESSURE: 139 MMHG | RESPIRATION RATE: 20 BRPM | TEMPERATURE: 97.7 F | HEART RATE: 60 BPM | BODY MASS INDEX: 40.43 KG/M2 | OXYGEN SATURATION: 94 % | DIASTOLIC BLOOD PRESSURE: 75 MMHG | HEIGHT: 74 IN

## 2020-08-31 DIAGNOSIS — C90.00 MULTIPLE MYELOMA NOT HAVING ACHIEVED REMISSION (HCC): Primary | ICD-10-CM

## 2020-08-31 PROCEDURE — 96401 CHEMO ANTI-NEOPL SQ/IM: CPT

## 2020-08-31 RX ORDER — DEXAMETHASONE 4 MG/1
20 TABLET ORAL ONCE
Status: COMPLETED | OUTPATIENT
Start: 2020-08-31 | End: 2020-08-31

## 2020-08-31 RX ADMIN — BORTEZOMIB 3.5 MG: 3.5 INJECTION, POWDER, LYOPHILIZED, FOR SOLUTION INTRAVENOUS; SUBCUTANEOUS at 12:00

## 2020-08-31 RX ADMIN — DEXAMETHASONE 20 MG: 4 TABLET ORAL at 11:48

## 2020-08-31 NOTE — PROGRESS NOTES
Rec'd pt via wc , Large hematoma and brow laceration over Left eye from a fall yesterday for which pt was treated at [facility] inj to lower left abdomen tolerated well  Awiting transport back to [facility]

## 2020-09-03 ENCOUNTER — TRANSCRIBE ORDERS (OUTPATIENT)
Dept: ADMINISTRATIVE | Facility: HOSPITAL | Age: 70
End: 2020-09-03

## 2020-09-03 DIAGNOSIS — R93.89 ABNORMAL RADIOLOGICAL FINDINGS IN SKIN AND SUBCUTANEOUS TISSUE: Primary | ICD-10-CM

## 2020-09-03 LAB — INR PPP: 1.5 (ref 0.84–1.19)

## 2020-09-04 ENCOUNTER — ANTICOAG VISIT (OUTPATIENT)
Dept: CARDIOLOGY CLINIC | Facility: CLINIC | Age: 70
End: 2020-09-04

## 2020-09-04 DIAGNOSIS — I48.20 CHRONIC ATRIAL FIBRILLATION (HCC): ICD-10-CM

## 2020-09-04 NOTE — PROGRESS NOTES
Tc to [facility], left message on nurses line, will fax to increase dose, 5 5 mg sun/tues/th, 7 mg other days of the week  inr due 1 week  Requested call back if any missed doses or medication changes

## 2020-09-07 NOTE — PROGRESS NOTES
Patient has been cleared for discharge and today, Per Dr. Verdugo from [facility], she does not need in patient [facility] admission anymore. She can go home and has no driving restrictions as communicated by  Danelle when she spoke to Dr. Ospina. Dr. Ratliff was also informed of the d/c plannin. Patient agreed to home health care service to see her at least to see her 2 times. Will make necessary arrangements with . Patient's car is at the ER parking garage and was verified by Public Safety Marlon when he was provided with the plate number.   Progress note - Gastroenterology   Aleksandra Bella 81 y o  male MRN: [id number]  Unit/Bed#: [address] [place] [address] [id number] Encounter: [id number]    ASSESSMENT/PLAN   1  Abnormal liver function studies  May be related to fatty liver possibly no significant elevation  Has more of an isolated elevation of alkaline phosphatase which could be bone related  -could check GGT of fractionated alkaline phosphatase      2  Gallstones noted on MRCP  No common bile duct stone no symptoms related to biliary tract disease  -observe no intervention    3  Multiple myeloma-  -management per Oncology    4  Cellulitis  -management per [facility]         Chief Complaint   Patient presents with    Shortness of Breath     pt states being SOB since last night, came to infusion center today, was sent down   pt states he feels like hes in heart failure       SUBJECTIVE/HPI   No abdominal distress    /64 (BP Location: Right arm)   Pulse 65   Temp 98 6 °F (37 °C) (Oral)   Resp 18   Ht 6' 3" (1 905 m)   Wt (!) 149 kg (327 lb 9 7 oz) Comment: 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  SpO2 92%   BMI 40 95 kg/m²     PHYSICALEXAM  General appearance: alert, appears stated age and cooperative  Eyes: PERLLA, EOMI, no icterus   Head: Normocephalic, without obvious abnormality, atraumatic  Lungs: clear to auscultation bilaterally  Heart: regular rate and rhythm, S1, S2 normal, no murmur, click, rub or gallop  Abdomen: soft, non-tender; bowel sounds normal; no masses,  no organomegaly--obese  Extremities: extremities normal, atraumatic, no cyanosis positive edema  Neurologic: Grossly normal    Lab Results   Component Value Date    GLUCOSE 139 12/22/2015    CALCIUM 8 4 11/01/2019     01/15/2018    K 3 6 11/01/2019    CO2 27 11/01/2019     11/01/2019    BUN 36 (H) 11/01/2019    CREATININE 1 38 (H) 11/01/2019     Lab Results   Component Value Date    WBC 6 11 11/01/2019    HGB 8 4 (L) 11/01/2019    HCT 27 9 (L) 11/01/2019    MCV 93 11/01/2019     11/01/2019     Lab Results   Component Value Date    ALT 39 11/01/2019    AST 24 11/01/2019     (H) 10/31/2019    ALKPHOS 349 (H) 11/01/2019    BILITOT 0 6 01/15/2018     No results found for: AMYLASE  No results found for: LIPASE  Lab Results   Component Value Date    IRON 49 (L) 11/01/2019    TIBC 254 11/01/2019    FERRITIN 824 (H) 11/01/2019     Lab Results   Component Value Date    INR 2 11 (H) 11/01/2019

## 2020-09-08 ENCOUNTER — HOSPITAL ENCOUNTER (OUTPATIENT)
Dept: INFUSION CENTER | Facility: HOSPITAL | Age: 70
Discharge: HOME | End: 2020-09-08
Attending: INTERNAL MEDICINE
Payer: COMMERCIAL

## 2020-09-08 VITALS
BODY MASS INDEX: 38.89 KG/M2 | TEMPERATURE: 98 F | RESPIRATION RATE: 18 BRPM | DIASTOLIC BLOOD PRESSURE: 60 MMHG | HEIGHT: 74 IN | WEIGHT: 303 LBS | SYSTOLIC BLOOD PRESSURE: 118 MMHG | OXYGEN SATURATION: 97 % | HEART RATE: 66 BPM

## 2020-09-08 DIAGNOSIS — C90.00 MULTIPLE MYELOMA NOT HAVING ACHIEVED REMISSION (HCC): Primary | ICD-10-CM

## 2020-09-08 PROCEDURE — 96401 CHEMO ANTI-NEOPL SQ/IM: CPT

## 2020-09-08 RX ORDER — DEXAMETHASONE 4 MG/1
20 TABLET ORAL ONCE
Status: COMPLETED | OUTPATIENT
Start: 2020-09-08 | End: 2020-09-08

## 2020-09-08 RX ADMIN — DEXAMETHASONE 20 MG: 4 TABLET ORAL at 12:30

## 2020-09-08 RX ADMIN — BORTEZOMIB 3.4 MG: 3.5 INJECTION, POWDER, LYOPHILIZED, FOR SOLUTION INTRAVENOUS; SUBCUTANEOUS at 12:52

## 2020-09-08 NOTE — PLAN OF CARE
Problem: Potential for Falls  Goal: Patient will remain free of falls  Description: INTERVENTIONS:  - Assess patient frequently for physical needs  -  Identify cognitive and physical deficits and behaviors that affect risk of falls  -  [place] fall precautions as indicated by assessment   - Educate patient/family on patient safety including physical limitations  - Instruct patient to call for assistance with activity based on assessment  - Modify environment to reduce risk of injury  - Consider OT/PT consult to assist with strengthening/mobility  Outcome: Progressing     Problem: Knowledge Deficit  Goal: Patient/family/caregiver demonstrates understanding of disease process, treatment plan, medications, and discharge instructions  Description: Complete learning assessment and assess knowledge base    Interventions:  - Provide teaching at level of understanding  - Provide teaching via preferred learning methods  Outcome: Progressing

## 2020-09-08 NOTE — PROGRESS NOTES
Velcade given SQ Rt side of abdomen  Dsd applied  Pt disch via w/c to [facility]  Pt had to wait for  to pick him up and take him back to [facility]

## 2020-09-08 NOTE — PROGRESS NOTES
Pt arrived via w/c for Velcade injection  Pt has healing bruises on Lt side of face  Pt states it looks better than it did the last time he was here  [address]  for pt's current weight  Unable to weigh pt here today  Oral premeds given  Waiting for Velcade

## 2020-09-10 ENCOUNTER — ANTICOAG VISIT (OUTPATIENT)
Dept: CARDIOLOGY CLINIC | Facility: CLINIC | Age: 70
End: 2020-09-10

## 2020-09-10 DIAGNOSIS — I48.20 CHRONIC ATRIAL FIBRILLATION (HCC): ICD-10-CM

## 2020-09-10 LAB — INR PPP: 1.6 (ref 0.84–1.19)

## 2020-09-10 NOTE — PROGRESS NOTES
Called and faxed [phone] with instructions and faxed to [phone]  10 mg today they 5 5 Tues thurs 7 mg others   Recheck 2 weeks

## 2020-09-11 ENCOUNTER — TELEPHONE (OUTPATIENT)
Dept: HEMATOLOGY ONCOLOGY | Facility: CLINIC | Age: 70
End: 2020-09-11

## 2020-09-11 NOTE — TELEPHONE ENCOUNTER
Appointment Confirmation     Appointment with  Perez Backer   Appointment date & time 9-14-20 @ 10:00am   Location [place]   Patient verbilized Understanding

## 2020-09-11 NOTE — PROGRESS NOTES
Hematology/Oncology Outpatient Follow- up Note  Lorelei Sky 1950, [id number]  9/14/2020        Chief Complaint   Patient presents with    Follow-up       HPI:  Ramiro Seen a 80 yo male with multiple comorbidities including diabetes and hypertension with bone marrow biopsy proven multiple myeloma  He was referred to us in 8/2019 after 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  He was noted to have elevated kidney function and anemia which prompted work up for multiple myeloma  His skeletal survey was negative  He underwent bone marrow biopsy confirming multiple myeloma  He was initiated on treatment with with Velcade and Decadron weekly on a 35 day schedule   Velcade is 1 3 milligrams/meter squared and Decadron is 20 mg p o  On days 1, 8, 15, 22      Previous Hematologic/ Oncologic History:    Oncology History   Multiple myeloma ([facility] )   9/16/2019 Initial Diagnosis    Multiple myeloma not having achieved remission ([facility] )     9/24/2019 -  Chemotherapy    iron sucrose (VENOFER) injection 200 mg, 200 mg (100 % of original dose 200 mg), Intravenous, Once, 1 of 1 cycle  Dose modification: 200 mg (original dose 200 mg, Cycle 1, Reason: Other (See Comments))  Administration: 200 mg (9/24/2019)  bortezomib (VELCADE) subcutaneous injection 3 5 mg, 1 3 mg/m2 = 3 5 mg (81 3 % of original dose 1 6 mg/m2), Subcutaneous, Once, 8 of 12 cycles  Dose modification: 1 3 mg/m2 (original dose 1 6 mg/m2, Cycle 1, Reason: Dose Not Tolerated)  Administration: 3 5 mg (9/24/2019), 3 5 mg (10/1/2019), 3 5 mg (10/8/2019), 3 5 mg (10/15/2019), 3 5 mg (1/6/2020), 3 5 mg (1/13/2020), 3 5 mg (1/20/2020), 3 5 mg (1/27/2020), 3 5 mg (2/11/2020), 3 5 mg (2/17/2020), 3 5 mg (2/24/2020), 3 5 mg (3/2/2020), 3 5 mg (3/16/2020), 3 5 mg (3/23/2020), 3 5 mg (3/30/2020), 3 5 mg (4/6/2020), 3 5 mg (4/20/2020), 3 5 mg (4/27/2020), 3 5 mg (5/4/2020), 3 5 mg (5/11/2020), 3 5 mg (5/26/2020), 3 5 mg (6/1/2020), 3 5 mg (2020), 3 5 mg (6/15/2020), 3 5 mg (2020), 3 4 mg (2020), 3 4 mg (2020), 3 5 mg (2020), 3 4 mg (2020)         Current Hematologic/ Oncologic Treatment:    Velcade and Decadron       ECO - Symptomatic, <50% confined to bed    Interval History:   The patient presents for routine follow up  Since his last visit, he was seen in ED on  for evaluation after sustaining a fall, he feel asleep in his WC and fell forward banding his head  Workup was negative for fracture or traumatic malalignment     Most recent blood work completed on 9/10/20 was reviewed  His hemoglobin was 12 0, WBC 9 7, platelets 147  Calcium 8 8, normal liver function, creatinine 1 04  Is myeloma labs are stable  Free light chain ration is normal      Patient is in good spirits today  He has contusion over left eye that his healing secondary to his fall  He has some mild fatigue but otherwise states he is at baseline  His appetite is good  Denies and N/V/D/C  Cancer Staging:  Cancer Staging  No matching staging information was found for the patient  Molecular Testing:       Test Results:    Imaging: Xr Spine Lumbar 2 Or 3 Views Injury    Result Date: 2020  Narrative: LUMBAR SPINE INDICATION:   pain s/p trauma  COMPARISON:  None VIEWS:  XR SPINE LUMBAR 2 OR 3 VIEWS INJURY FINDINGS: Study detail limited by overlying artifact as well as body habitus  There are 5 non rib bearing lumbar vertebral bodies  There is no evidence of acute fracture or destructive osseous lesion  Alignment is unremarkable  Multilevel marginal osteophyte formation  Mild disc space narrowing at L1-2  Soft tissues are unremarkable  Impression: Limited as above  No acute abnormality identified  Workstation performed: [id number]     Trauma - Ct Head Wo Contrast    Result Date: 2020  Narrative: CT BRAIN - WITHOUT CONTRAST INDICATION:   trauma  COMPARISON:  None  TECHNIQUE:  CT examination of the brain was performed    In addition to axial images, sagittal and coronal 2D reformatted images were created and submitted for interpretation  Radiation dose length product (DLP) for this visit:  1001 76 mGy-cm   This examination, like all CT scans performed in the [facility], was performed utilizing techniques to minimize radiation dose exposure, including the use of iterative reconstruction and automated exposure control  IMAGE QUALITY:  Diagnostic  FINDINGS: PARENCHYMA:  No intracranial mass, mass effect or midline shift  No CT signs of acute infarction  No acute parenchymal hemorrhage  VENTRICLES AND EXTRA-AXIAL SPACES:  Normal for the patient's age  VISUALIZED ORBITS AND PARANASAL SINUSES:  Unremarkable  CALVARIUM AND EXTRACRANIAL SOFT TISSUES:  Normal      Impression: No acute intracranial abnormality  I personally discussed this study with Clay Gage on 8/30/2020 at 4:43 PM  Workstation performed: [id number]     Ct Chest Without Contrast    Result Date: 8/24/2020  Narrative: CT CHEST WITHOUT IV CONTRAST INDICATION:   R93 89: Abnormal findings on diagnostic imaging of other specified body structures  COMPARISON:  CT chest 7/9/2020  TECHNIQUE: CT examination of the chest was performed without intravenous contrast   Axial, sagittal, and coronal 2D reformatted images were created from the source data and submitted for interpretation  Radiation dose length product (DLP) for this visit:  665 5 mGy-cm   This examination, like all CT scans performed in the [facility], was performed utilizing techniques to minimize radiation dose exposure, including the use of iterative reconstruction and automated exposure control  FINDINGS: LUNGS:  Significant improvement in bilateral peripheral groundglass airspace opacities with persistent more consolidative opacities at the bilateral lower lobes  There is no tracheal or endobronchial lesion  PLEURA:  Small bilateral pleural effusions   HEART/GREAT VESSELS:  Atherosclerotic changes are noted in thoracic aorta and coronary arteries  Heart is enlarged MEDIASTINUM AND MARGA:  Unremarkable  CHEST WALL AND LOWER NECK:   Unremarkable  VISUALIZED STRUCTURES IN THE UPPER ABDOMEN:  Unremarkable  OSSEOUS STRUCTURES:  No acute fracture or destructive osseous lesion  Impression: Significant improvement in bilateral peripheral groundglass opacities  Persistent more dense consolidation at the bilateral lung bases, potentially representing atelectatic change  Small bilateral pleural effusions  Repeat CT chest in 6 months recommended to ensure continued resolution of CT findings  The study was marked in EPIC for significant notification  Workstation performed: [id number]     Trauma - Ct Spine Cervical Wo Contrast    Result Date: 8/30/2020  Narrative: CT CERVICAL SPINE - WITHOUT CONTRAST INDICATION:   trauma  COMPARISON:  None  TECHNIQUE:  CT examination of the cervical spine was performed without intravenous contrast   Contiguous axial images were obtained  Sagittal and coronal reconstructions were performed  Radiation dose length product (DLP) for this visit:  562 17 mGy-cm   This examination, like all CT scans performed in the [place], was performed utilizing techniques to minimize radiation dose exposure, including the use of iterative  reconstruction and automated exposure control  IMAGE QUALITY:  Diagnostic  FINDINGS: ALIGNMENT:  Normal alignment of the cervical spine  No subluxation  VERTEBRAL BODIES:  No fracture  DEGENERATIVE CHANGES:  Mild multilevel cervical degenerative changes are noted without critical central canal stenosis  PREVERTEBRAL AND PARASPINAL SOFT TISSUES:  Unremarkable  THORACIC INLET:  Normal      Impression: No cervical spine fracture or traumatic malalignment    I personally discussed this study with Alecia Lee on 8/30/2020 at 4:43 PM   Workstation performed: [id number]     Xr Chest 1 View    Result Date: 8/31/2020  Narrative: TRAUMA SERIES INDICATION:  Status post fall from bed  COMPARISON:  None VIEWS:  XR TRAUMA MULTIPLE FINDINGS: CHEST: Supine frontal view of the chest is obtained  Cardiac silhouette is probably top normal accounting for technique and patient rotation to the left  Lungs are grossly clear  Somewhat limited evaluation of the left lung base  No layering pleural effusions detected  No pneumothorax is seen on this supine film  Upright images are more sensitive to detect anterior pneumothoraces if relevant  No displaced fractures  Impression: No acute cardiopulmonary disease within limitations of supine imaging  If relevant, upright imaging with better positioning may be obtained when clinically feasible  Workstation performed: [id number]     Xr Trauma Multiple    Result Date: 8/31/2020  Narrative: TRAUMA SERIES INDICATION:  Status post fall from bed  COMPARISON:  None VIEWS:  XR TRAUMA MULTIPLE FINDINGS: CHEST: Supine frontal view of the chest is obtained  Cardiac silhouette is probably top normal accounting for technique and patient rotation to the left  Lungs are grossly clear  Somewhat limited evaluation of the left lung base  No layering pleural effusions detected  No pneumothorax is seen on this supine film  Upright images are more sensitive to detect anterior pneumothoraces if relevant  No displaced fractures  Impression: No acute cardiopulmonary disease within limitations of supine imaging  If relevant, upright imaging with better positioning may be obtained when clinically feasible   Workstation performed: [id number]       Labs:   Lab Results   Component Value Date    WBC 7 21 08/04/2020    HGB 13 3 08/30/2020    HCT 39 08/30/2020    MCV 93 08/04/2020     08/04/2020     Lab Results   Component Value Date     01/15/2018    K 4 2 08/04/2020     08/04/2020    CO2 32 08/30/2020    ANIONGAP 11 12/22/2015    BUN 18 08/04/2020    CREATININE 1 30 08/04/2020    GLUCOSE 195 (H) 08/30/2020    GLUF 141 (H) 02/26/2019    CALCIUM 9 2 08/04/2020    AST 23 08/04/2020    ALT 19 08/04/2020    ALKPHOS 104 08/04/2020    PROT 7 1 01/15/2018    BILITOT 0 6 01/15/2018    EGFR 56 08/04/2020         Review of Systems   Constitutional: Positive for fatigue  Musculoskeletal: Positive for gait problem (Uses WC)  L AKA   Skin: Positive for color change (contusion over left eye, healing)  Hematological: Bruises/bleeds easily  All other systems reviewed and are negative          Active Problems:   Patient Active Problem List   Diagnosis    Diabetic foot ulcer ([place] [address] )    Diabetes mellitus type 2, uncontrolled ([place] [address] )    Chronic venous hypertension, right    Essential hypertension    Chronic atrial fibrillation (HCC)    Morbid obesity (HCC)    Chronic diastolic congestive heart failure (HCC)    Cardiomyopathy (HCC)    Diabetes, polyneuropathy ([place] [address] )    GERD (gastroesophageal reflux disease)    Hyperlipidemia    Onychomycosis    Gallstones    Localized edema    Peripheral vascular disease (HCC)    SOB (shortness of breath)    NALLELY (obstructive sleep apnea)    Moderate persistent asthma without complication    Multiple myeloma (HCC)    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)    Hiatal hernia    Weakness    Type 2 acute myocardial infarction (HCC)    Chronic obstructive pulmonary disease, unspecified ([place] [address] )    Hypertensive chronic kidney disease w stg 1-4/unsp chr kdny    Mass of psoas muscle    CKD (chronic kidney disease)    Chronic diastolic (congestive) heart failure (HCC)    Lymphedema    Rash    Difficulty in walking, not elsewhere classified    Gout    Venous insufficiency (chronic) (peripheral)    Acute respiratory failure with hypoxia due to Pneumonia due to COVID-19 virus    Sepsis due to COVID-19 pneumonia ([place] [address] )    Fall from bed       Past Medical History:   Past Medical History:   Diagnosis Date    Cancer ([place] [address] )     CHF (congestive heart failure) (HCC)     Chronic kidney disease     COPD [facility]     Decubitus ulcer of heel     LAST ASSESSED [id number]    Diabetes mellitus ([facility] )     History of varicose veins     Hypertension     Intermittent claudication (HCC)     Neuropathy     Seasonal allergies        Surgical History:   Past Surgical History:   Procedure Laterality Date    AMPUTATION ABOVE KNEE (AKA) Left 7/31/2019    Procedure: AMPUTATION ABOVE KNEE (AKA); Surgeon: Javier Rutledge MD;  Location: [facility];  Service: General    ANGIOPLASTY      W/ FLUOROSC ANGIOGRAPGY PERIPHERAL ARTERY ADDITIONAL  LAST ASSESSED [id number]    ANGIOPLASTY / STENTING FEMORAL      TANSCATH INTRAVASCULAR STENT PLACEMENT PERCUTANEOUS FEMORAL     COLONOSCOPY  2010    CT BONE MARROW BIOPSY AND ASPIRATION  8/9/2019    FULL THICKNESS SKIN GRAFT      TENDON REPAIR      TOE AMPUTATION Left 12/27/2018    Procedure: AMPUTATION left 4th TOE;  Surgeon: Verónica Avilez DPM;  Location: [facility];  Service: Podiatry       Family History:    Family History   Problem Relation Age of Onset    Other Mother         CARDIAC DISORDER     Diabetes Mother     Cancer Father     Other Family         CARDIAC DISORDER     Diabetes Family     Cancer Family     Mental illness Family     Kidney disease Sister     Diabetes Sister        Cancer-related family history includes Cancer in his family and father      Social History:   Social History     Socioeconomic History    Marital status:      Spouse name: Not on file    Number of children: 1    Years of education: Not on file    Highest education level: Not on file   Occupational History    Occupation: RETIRED   Social Needs    Financial resource strain: Not on file    Food insecurity     Worry: Not on file     Inability: Not on file   [employer] needs     Medical: Not on file     Non-medical: Not on file   Tobacco Use    Smoking status: Never Smoker    Smokeless tobacco: Never Used Substance and Sexual Activity    Alcohol use: Not Currently    Drug use: Not Currently    Sexual activity: Not Currently   Lifestyle    Physical activity     Days per week: Not on file     Minutes per session: Not on file    Stress: Not on file   Relationships    Social connections     Talks on phone: Not on file     Gets together: Not on file     Attends Mu-ism service: Not on file     Active member of club or organization: Not on file     Attends meetings of clubs or organizations: Not on file     Relationship status: Not on file    Intimate partner violence     Fear of current or ex partner: Not on file     Emotionally abused: Not on file     Physically abused: Not on file     Forced sexual activity: Not on file   Other Topics Concern    Not on file   Social History Narrative    DENIED [address]    FEELS SAFE AT HOME    LIVES WITH FAMILY - SISTER    NO LIVING WILL    POA IN EXISTENCE     SUPPORTIVE AND SAFE    One son, 2 granddaughters       Current Medications:   Current Outpatient Medications   Medication Sig Dispense Refill    acetaminophen (TYLENOL) 500 mg tablet Take 1 tablet (500 mg total) by mouth every 6 (six) hours as needed for mild pain, headaches or fever 90 tablet 1    albuterol (PROVENTIL HFA,VENTOLIN HFA) 90 mcg/act inhaler Inhale 2 puffs every 6 (six) hours as needed for wheezing 1 Inhaler 0    Alcohol Swabs (ALCOHOL PREP) 70 % PADS   0    allopurinol (ZYLOPRIM) 300 mg tablet TAKE ONE TABLET BY MOUTH DAILY (Patient taking differently: Take 100 mg by mouth daily ) 30 tablet 5    ammonium lactate (LAC-HYDRIN) 12 % lotion APPLY TO BLE TOPICALLY DAILY 400 g 2    atorvastatin (LIPITOR) 40 mg tablet Take 1 tablet (40 mg total) by mouth daily after dinner 30 tablet 0    BD AUTOSHIELD DUO 30G X 5 MM MISC USE AS DIRECTED WITH INSULIN FOUR TIMES A  each 3    BD INSULIN SYRINGE U/F 31G X 5/16" 0 5 ML MISC USE AS DIRECTED WITH NOVOLIN 70/30  0    BD PEN NEEDLE HILARIO U/F 32G X 4 MM MISC by Other route 3 (three) times a day 300 each 1    bisacodyl (bisacodyl) 5 mg EC tablet Take 5 mg by mouth daily as needed for constipation      bisacodyl (DULCOLAX) 10 mg suppository Insert 10 mg into the rectum as needed for constipation      bortezomib (VELCADE) Infuse into a venous catheter once      clotrimazole (LOTRIMIN) 1 % cream APPLY TO BUTTOCK 2 TIMES DAILY 45 g 3    Easy Touch Safety Lancets 28G MISC USE 4 TIMES DAILY TO TEST BLOOD SUGAR 100 each 5    fluticasone-salmeterol (ADVAIR DISKUS, WIXELA INHUB) 250-50 mcg/dose inhaler Inhale 1 puff 2 (two) times a day Rinse mouth after use  1 Inhaler 5    insulin glargine (LANTUS SOLOSTAR) 100 units/mL injection pen Inject 22 Units under the skin daily 5 pen 5    magnesium hydroxide (MILK OF MAGNESIA) 400 mg/5 mL oral suspension Take 30 mL by mouth daily as needed for constipation      Melatonin 5 MG TABS TAKE ONE TABLET BY MOUTH EVERY NIGHT AT BEDTIME 30 tablet 2    metFORMIN (GLUCOPHAGE) 1000 MG tablet TAKE ONE TABLET BY MOUTH TWO TIMES A DAY 60 tablet 2    metoprolol tartrate (LOPRESSOR) 25 mg tablet Take 0 5 tablets (12 5 mg total) by mouth every 12 (twelve) hours 30 tablet 0    NOVOLOG FLEXPEN 100 units/mL SOPN INJ 5U BEFORE MEALS AND PER SS <250=NO CALL 250-300=5U,301-350= 10U,351-400=15U,401-450=20U>451 CALL FOR ORDERS UP TO 4X PER DAY 15 mL 5    torsemide (DEMADEX) 10 mg tablet TAKE 2 TABLETS BY MOUTH TWO TIMES A  tablet 2    triamcinolone (KENALOG) 0 1 % cream APPLY TO THE FACE TWO TIMES A DAY 30 g 2    warfarin (COUMADIN) 7 5 mg tablet 7 5 mg on August 12th and 13th then INR on August 14th (Patient taking differently: Take 5 mg by mouth daily Takes 1 5 tabs at HS every Sunday, Tuesday, Thursday      Takes 2 tabs HS every Monday, Wednesday, Friday and Saturday) 30 tablet 0    Insulin Pen Needle 31G X 5 MM MISC by Does not apply route 2 (two) times a day for 50 days 100 each 0     No current facility-administered medications for this visit  Allergies: Allergies   Allergen Reactions    Latex Rash       Physical Exam:  /62 (BP Location: Right arm)   Pulse 62   Temp 97 9 °F (36 6 °C) (Tympanic)   Resp 16   SpO2 99%   There is no height or weight on file to calculate BSA  Wt Readings from Last 3 Encounters:   09/08/20 (!) 137 kg (303 lb)   08/31/20 (!) 148 kg (326 lb)   08/30/20 (!) 151 kg (333 lb 1 8 oz)           Physical Exam  Constitutional:       General: He is not in acute distress  Appearance: He is obese  He is not ill-appearing  HENT:      Head: Normocephalic and atraumatic  Mouth/Throat:      Mouth: Mucous membranes are moist       Pharynx: Oropharynx is clear  Eyes:      General: No scleral icterus  Right eye: No discharge  Left eye: No discharge  Extraocular Movements: Extraocular movements intact  Comments: ecchymosis over left eye, healing    Neck:      Musculoskeletal: Normal range of motion and neck supple  Cardiovascular:      Rate and Rhythm: Normal rate and regular rhythm  Heart sounds: Normal heart sounds  Pulmonary:      Effort: Pulmonary effort is normal       Breath sounds: Normal breath sounds  Abdominal:      General: Bowel sounds are normal  There is no distension  Palpations: Abdomen is soft  Musculoskeletal:      Comments: L AKA   Skin:     General: Skin is warm and dry  Findings: Bruising present  Neurological:      General: No focal deficit present  Mental Status: He is alert and oriented to person, place, and time  Psychiatric:         Mood and Affect: Mood normal          Behavior: Behavior normal          Assessment / Plan:    1   Multiple myeloma not having achieved remission [facility])      The patient is a very pleasant 79 yo male with multiple comorbidities including diabetes and hypertension with bone marrow biopsy proven multiple myeloma  He was referred to us in 8/2019 after 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  He was noted to have elevated kidney function and anemia which prompted work up for multiple myeloma  His skeletal survey was negative  He underwent bone marrow biopsy confirming multiple myeloma  He was initiated on treatment with with Velcade and Decadron weekly on a 35 day schedule  Velcade is 1 3 milligrams/meter squared and Decadron is 20 mg p o  On days 1, 8, 15, 22  He continues to tolerate his treatment well  His myeloma labs are stable  He will continue with current regimen without change  He will return for follow up in 2 months with repeat blood work  He will have labs done prior to treatments as planned  Patient was in agreement with plan of care  He was instructed to call with any questions or concerns prior to his next office visit  Goals and Barriers:  Current Goal:  Prolong Survival from multiple myeloma   Barriers: None  Patient's Capacity to Self Care:  Patient is  able to self care  Portions of the record may have been created with voice recognition software  Occasional wrong word or "sound a like" substitutions may have occurred due to the inherent limitations of voice recognition software  Read the chart carefully and recognize, using context, where substitutions have occurred

## 2020-09-12 NOTE — PROGRESS NOTES
Cardiology Follow Up    Linda Olivarez  1950  [id number]  [facility] [place]  [address]  [address]  [phone]    1  Essential hypertension     2  Pure hypercholesterolemia     3  Cardiomyopathy, unspecified type ([facility] )     4  Atrial fibrillation, unspecified type ([facility] )     5  Chronic diastolic congestive heart failure (HCC)         Interval History: Patient is here for a follow-up visit  He has a history of CAF but has been reluctant and noncompliant with anticoagulant therapy  [facility] had previously been on Coumadin but discontinued this but now is back on this due to prior DVT  [facility] had a venous Doppler December 26th 2018 which demonstrated chronic non occlusive thrombus in the left lower extremity    His most recent echocardiogram done April 4, 2019 demonstrated preserved LV systolic function with mild LVH  Grade 3 diastolic dysfunction was suggested  Patient was noted to have biatrial cavity enlargement with no significant valvular heart disease     Patient had a nuclear stress test done March 2016 which showed no evidence of prognostically important ischemia   The patient had Left AKA July 31, 2019 in the setting of a nonhealing foot wound  Patient had a lipid profile done June 2020 which demonstrated total cholesterol of 107 with an HDL of 51 and a calculated LDL of 30  Patient follows with Hematology Oncology in reference to multiple myeloma with ongoing treatment  Patient does have obstructive sleep apnea  He lives at [facility]  Patient has had no cardiac symptoms  He has had no chest pain or significant dyspnea  His vital signs are stable      Patient Active Problem List   Diagnosis    Diabetic foot ulcer ([facility] )    Diabetes mellitus type 2, uncontrolled ([facility] )    Chronic venous hypertension, right    Essential hypertension    Chronic atrial fibrillation ([facility] )    Morbid obesity (HCC)    Chronic diastolic congestive heart failure (HCC)    Cardiomyopathy (HCC)    Diabetes, polyneuropathy (HCC)    GERD (gastroesophageal reflux disease)    Hyperlipidemia    Onychomycosis    Gallstones    Localized edema    Peripheral vascular disease (HCC)    SOB (shortness of breath)    NALLELY (obstructive sleep apnea)    Moderate persistent asthma without complication    Multiple myeloma (HCC)    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)    Hiatal hernia    Weakness    Type 2 acute myocardial infarction (HCC)    Chronic obstructive pulmonary disease, unspecified (HCC)    Hypertensive chronic kidney disease w stg 1-4/unsp chr kdny    Mass of psoas muscle    CKD (chronic kidney disease)    Chronic diastolic (congestive) heart failure (HCC)    Lymphedema    Rash    Difficulty in walking, not elsewhere classified    Gout    Venous insufficiency (chronic) (peripheral)    Acute respiratory failure with hypoxia due to Pneumonia due to COVID-19 virus    Sepsis due to COVID-19 pneumonia ([facility] )    Fall from bed     Past Medical History:   Diagnosis Date    Cancer ([place] [address] )     CHF (congestive heart failure) (HCC)     Chronic kidney disease     COPD (chronic obstructive pulmonary disease) (HCC)     Decubitus ulcer of heel     LAST ASSESSED [id number]    Diabetes mellitus ([facility] )     History of varicose veins     Hypertension     [facility]termitt[facility]audication [facility])     Neuropathy     Seasonal allergies      Social History     Socioeconomic History    Marital status:      Spouse name: Not on file    Number of children: 1    Years of education: Not on file    Highest education level: Not on file   Occupational History    Occupation: RETIRED   Social Needs    Financial resource strain: Not on file    Food insecurity     Worry: Not on file     Inability: Not on file   [employer] needs     Medical: Not on file     Non-medical: Not on file   Tobacco Use    Smoking status: Never Smoker    Smokeless tobacco: Never Used   Substance and Sexual Activity    Alcohol use: Not Currently    Drug use: Not Currently    Sexual activity: Not Currently   Lifestyle    Physical activity     Days per week: Not on file     Minutes per session: Not on file    Stress: Not on file   Relationships    Social connections     Talks on phone: Not on file     Gets together: Not on file     Attends Congregation service: Not on file     Active member of club or organization: Not on file     Attends meetings of clubs or organizations: Not on file     Relationship status: Not on file    Intimate partner violence     Fear of current or ex partner: Not on file     Emotionally abused: Not on file     Physically abused: Not on file     Forced sexual activity: Not on file   Other Topics Concern    Not on file   Social History Narrative    DENIED [address]    FEELS SAFE AT HOME    LIVES WITH FAMILY - SISTER    NO LIVING WILL    POA IN EXISTENCE     SUPPORTIVE AND SAFE    One son, 2 granddaughters      Family History   Problem Relation Age of Onset    Other Mother         CARDIAC DISORDER     Diabetes Mother     Cancer Father     Other Family         CARDIAC DISORDER     Diabetes Family     Cancer Family     Mental illness Family     Kidney disease Sister     Diabetes Sister      Past Surgical History:   Procedure Laterality Date    AMPUTATION ABOVE KNEE (AKA) Left 7/31/2019    Procedure: AMPUTATION ABOVE KNEE (AKA);   Surgeon: Gardiner Sicard, MD;  Location:  MAIN OR;  Service: General    ANGIOPLASTY      W/ FLUOROSC ANGIOGRAPGY PERIPHERAL ARTERY ADDITIONAL  LAST ASSESSED [id number]    ANGIOPLASTY / STENTING FEMORAL      TANSCATH INTRAVASCULAR STENT PLACEMENT PERCUTANEOUS FEMORAL     COLONOSCOPY  2010    CT BONE MARROW BIOPSY AND ASPIRATION  8/9/2019    FULL THICKNESS SKIN GRAFT      TENDON REPAIR      TOE AMPUTATION Left 12/27/2018    Procedure: AMPUTATION left 4th TOE;  Surgeon: Wendy Gonzalez DPM;  Location: [facility];  Service: Podiatry       Current Outpatient Medications:     acetaminophen (TYLENOL) 500 mg tablet, Take 1 tablet (500 mg total) by mouth every 6 (six) hours as needed for mild pain, headaches or fever, Disp: 90 tablet, Rfl: 1    albuterol (PROVENTIL HFA,VENTOLIN HFA) 90 mcg/act inhaler, Inhale 2 puffs every 6 (six) hours as needed for wheezing, Disp: 1 Inhaler, Rfl: 0    Alcohol Swabs (ALCOHOL PREP) 70 % PADS, , Disp: , Rfl: 0    allopurinol (ZYLOPRIM) 300 mg tablet, TAKE ONE TABLET BY MOUTH DAILY (Patient taking differently: Take 100 mg by mouth daily ), Disp: 30 tablet, Rfl: 5    ammonium lactate (LAC-HYDRIN) 12 % lotion, APPLY TO BLE TOPICALLY DAILY, Disp: 400 g, Rfl: 2    atorvastatin (LIPITOR) 40 mg tablet, Take 1 tablet (40 mg total) by mouth daily after dinner, Disp: 30 tablet, Rfl: 0    BD AUTOSHIELD DUO 30G X 5 MM MISC, USE AS DIRECTED WITH INSULIN FOUR TIMES A DAY, Disp: 100 each, Rfl: 3    BD INSULIN SYRINGE U/F 31G X 5/16" 0 5 ML MISC, USE AS DIRECTED WITH NOVOLIN 70/30, Disp: , Rfl: 0    BD PEN NEEDLE HILARIO U/F 32G X 4 MM MISC, by Other route 3 (three) times a day, Disp: 300 each, Rfl: 1    bisacodyl (bisacodyl) 5 mg EC tablet, Take 5 mg by mouth daily as needed for constipation, Disp: , Rfl:     bisacodyl (DULCOLAX) 10 mg suppository, Insert 10 mg into the rectum as needed for constipation, Disp: , Rfl:     bortezomib (VELCADE), Infuse into a venous catheter once, Disp: , Rfl:     clotrimazole (LOTRIMIN) 1 % cream, APPLY TO BUTTOCK 2 TIMES DAILY, Disp: 45 g, Rfl: 3    Easy Touch Safety Lancets 28G MISC, USE 4 TIMES DAILY TO TEST BLOOD SUGAR, Disp: 100 each, Rfl: 5    fluticasone-salmeterol (ADVAIR DISKUS, WIXELA INHUB) 250-50 mcg/dose inhaler, Inhale 1 puff 2 (two) times a day Rinse mouth after use , Disp: 1 Inhaler, Rfl: 5    Glutose 15 40 %, , Disp: , Rfl:     insulin glargine (LANTUS SOLOSTAR) 100 units/mL injection pen, Inject 22 Units under the skin daily, Disp: 5 pen, Rfl: 5    Insulin Pen Needle 31G X 5 MM MISC, by Does not apply route 2 (two) times a day for 50 days, Disp: 100 each, Rfl: 0    magnesium hydroxide (MILK OF MAGNESIA) 400 mg/5 mL oral suspension, Take 30 mL by mouth daily as needed for constipation, Disp: , Rfl:     Melatonin 5 MG TABS, TAKE ONE TABLET BY MOUTH EVERY NIGHT AT BEDTIME, Disp: 30 tablet, Rfl: 2    metFORMIN (GLUCOPHAGE) 1000 MG tablet, TAKE ONE TABLET BY MOUTH TWO TIMES A DAY (Patient taking differently: Take 1,000 mg by mouth daily with breakfast ), Disp: 60 tablet, Rfl: 2    metoprolol tartrate (LOPRESSOR) 25 mg tablet, Take 0 5 tablets (12 5 mg total) by mouth every 12 (twelve) hours, Disp: 30 tablet, Rfl: 0    NOVOLOG FLEXPEN 100 units/mL SOPN, INJ 5U BEFORE MEALS AND PER SS <250=NO CALL 250-300=5U,301-350= 10U,351-400=15U,401-450=20U>451 CALL FOR ORDERS UP TO 4X PER DAY, Disp: 15 mL, Rfl: 5    ondansetron (ZOFRAN) 4 mg tablet, ondansetron HCl 4 mg tablet  TAKE 1 TABLET BY MOUTH EVERY 8 HOURS AS NEEDED, Disp: , Rfl:     torsemide (DEMADEX) 10 mg tablet, TAKE 2 TABLETS BY MOUTH TWO TIMES A DAY, Disp: 120 tablet, Rfl: 2    warfarin (COUMADIN) 7 5 mg tablet, 7 5 mg on August 12th and 13th then INR on August 14th (Patient taking differently: Take 5 mg by mouth daily Takes 1 5 tabs at HS every Sunday, Tuesday, Thursday  Takes 2 tabs HS every Monday, Wednesday, Friday and Saturday), Disp: 30 tablet, Rfl: 0  Allergies   Allergen Reactions    Latex Rash       Labs:not applicable  Imaging: Xr Spine Lumbar 2 Or 3 Views Injury    Result Date: 8/30/2020  Narrative: LUMBAR SPINE INDICATION:   pain s/p trauma  COMPARISON:  None VIEWS:  XR SPINE LUMBAR 2 OR 3 VIEWS INJURY FINDINGS: Study detail limited by overlying artifact as well as body habitus  There are 5 non rib bearing lumbar vertebral bodies   There is no evidence of acute fracture or destructive osseous lesion  Alignment is unremarkable  Multilevel marginal osteophyte formation  Mild disc space narrowing at L1-2  Soft tissues are unremarkable  Impression: Limited as above  No acute abnormality identified  Workstation performed: [id number]     Trauma - Ct Head Wo Contrast    Result Date: 8/30/2020  Narrative: CT BRAIN - WITHOUT CONTRAST INDICATION:   trauma  COMPARISON:  None  TECHNIQUE:  CT examination of the brain was performed  In addition to axial images, sagittal and coronal 2D reformatted images were created and submitted for interpretation  Radiation dose length product (DLP) for this visit:  1001 76 mGy-cm   This examination, like all CT scans performed in the [facility], was performed utilizing techniques to minimize radiation dose exposure, including the use of iterative reconstruction and automated exposure control  IMAGE QUALITY:  Diagnostic  FINDINGS: PARENCHYMA:  No intracranial mass, mass effect or midline shift  No CT signs of acute infarction  No acute parenchymal hemorrhage  VENTRICLES AND EXTRA-AXIAL SPACES:  Normal for the patient's age  VISUALIZED ORBITS AND PARANASAL SINUSES:  Unremarkable  CALVARIUM AND EXTRACRANIAL SOFT TISSUES:  Normal      Impression: No acute intracranial abnormality  I personally discussed this study with Aníbal Hawley on 8/30/2020 at 4:43 PM  Workstation performed: [id number]     Ct Chest Without Contrast    Result Date: 8/24/2020  Narrative: CT CHEST WITHOUT IV CONTRAST INDICATION:   R93 89: Abnormal findings on diagnostic imaging of other specified body structures  COMPARISON:  CT chest 7/9/2020  TECHNIQUE: CT examination of the chest was performed without intravenous contrast   Axial, sagittal, and coronal 2D reformatted images were created from the source data and submitted for interpretation  Radiation dose length product (DLP) for this visit:  665 5 mGy-cm     This examination, like all CT scans performed in the [facility], was performed utilizing techniques to minimize radiation dose exposure, including the use of iterative reconstruction and automated exposure control  FINDINGS: LUNGS:  Significant improvement in bilateral peripheral groundglass airspace opacities with persistent more consolidative opacities at the bilateral lower lobes  There is no tracheal or endobronchial lesion  PLEURA:  Small bilateral pleural effusions  HEART/GREAT VESSELS:  Atherosclerotic changes are noted in thoracic aorta and coronary arteries  Heart is enlarged MEDIASTINUM AND MARGA:  Unremarkable  CHEST WALL AND LOWER NECK:   Unremarkable  VISUALIZED STRUCTURES IN THE UPPER ABDOMEN:  Unremarkable  OSSEOUS STRUCTURES:  No acute fracture or destructive osseous lesion  Impression: Significant improvement in bilateral peripheral groundglass opacities  Persistent more dense consolidation at the bilateral lung bases, potentially representing atelectatic change  Small bilateral pleural effusions  Repeat CT chest in 6 months recommended to ensure continued resolution of CT findings  The study was marked in EPIC for significant notification  Workstation performed: [id number]     Trauma - Ct Spine Cervical Wo Contrast    Result Date: 8/30/2020  Narrative: CT CERVICAL SPINE - WITHOUT CONTRAST INDICATION:   trauma  COMPARISON:  None  TECHNIQUE:  CT examination of the cervical spine was performed without intravenous contrast   Contiguous axial images were obtained  Sagittal and coronal reconstructions were performed  Radiation dose length product (DLP) for this visit:  562 17 mGy-cm   This examination, like all CT scans performed in the [facility], was performed utilizing techniques to minimize radiation dose exposure, including the use of iterative  reconstruction and automated exposure control  IMAGE QUALITY:  Diagnostic  FINDINGS: ALIGNMENT:  Normal alignment of the cervical spine   No subluxation  VERTEBRAL BODIES:  No fracture  DEGENERATIVE CHANGES:  Mild multilevel cervical degenerative changes are noted without critical central canal stenosis  PREVERTEBRAL AND PARASPINAL SOFT TISSUES:  Unremarkable  THORACIC INLET:  Normal      Impression: No cervical spine fracture or traumatic malalignment  I personally discussed this study with Deepthi Gomez on 8/30/2020 at 4:43 PM   Workstation performed: [id number]     Xr Chest 1 View    Result Date: 8/31/2020  Narrative: TRAUMA SERIES INDICATION:  Status post fall from bed  COMPARISON:  None VIEWS:  XR TRAUMA MULTIPLE FINDINGS: CHEST: Supine frontal view of the chest is obtained  Cardiac silhouette is probably top normal accounting for technique and patient rotation to the left  Lungs are grossly clear  Somewhat limited evaluation of the left lung base  No layering pleural effusions detected  No pneumothorax is seen on this supine film  Upright images are more sensitive to detect anterior pneumothoraces if relevant  No displaced fractures  Impression: No acute cardiopulmonary disease within limitations of supine imaging  If relevant, upright imaging with better positioning may be obtained when clinically feasible  Workstation performed: [id number]     Xr Trauma Multiple    Result Date: 8/31/2020  Narrative: TRAUMA SERIES INDICATION:  Status post fall from bed  COMPARISON:  None VIEWS:  XR TRAUMA MULTIPLE FINDINGS: CHEST: Supine frontal view of the chest is obtained  Cardiac silhouette is probably top normal accounting for technique and patient rotation to the left  Lungs are grossly clear  Somewhat limited evaluation of the left lung base  No layering pleural effusions detected  No pneumothorax is seen on this supine film  Upright images are more sensitive to detect anterior pneumothoraces if relevant  No displaced fractures  Impression: No acute cardiopulmonary disease within limitations of supine imaging   If relevant, upright imaging with better positioning may be obtained when clinically feasible  Workstation performed: [id number]       Review of Systems:  Review of Systems   All other systems reviewed and are negative  Physical Exam:  /60 (BP Location: Right arm, Patient Position: Sitting, Cuff Size: Standard)   Pulse 60   Temp 97 8 °F (36 6 °C)   Ht 6' 2" (1 88 m)   Wt (!) 139 kg (307 lb)   BMI 39 42 kg/m²   Physical Exam  Vitals signs reviewed  Constitutional:       Appearance: He is well-developed  HENT:      Head: Normocephalic and atraumatic  Eyes:      Conjunctiva/sclera: Conjunctivae normal       Pupils: Pupils are equal, round, and reactive to light  Neck:      Musculoskeletal: Normal range of motion and neck supple  Cardiovascular:      Rate and Rhythm: Normal rate  Heart sounds: Normal heart sounds  Pulmonary:      Effort: Pulmonary effort is normal       Breath sounds: Normal breath sounds  Musculoskeletal:      Comments: Left AKA   Skin:     General: Skin is warm and dry  Neurological:      Mental Status: He is alert and oriented to person, place, and time  Discussion/Summary:I will continue the patient's present medical regimen  Patient appears well compensated  I have asked the patient to call if there is a problem in the interim otherwise I will see the patient in one years time

## 2020-09-14 ENCOUNTER — HOSPITAL ENCOUNTER (OUTPATIENT)
Dept: INFUSION CENTER | Facility: HOSPITAL | Age: 70
Discharge: HOME | End: 2020-09-14
Attending: INTERNAL MEDICINE
Payer: COMMERCIAL

## 2020-09-14 ENCOUNTER — OFFICE VISIT (OUTPATIENT)
Dept: HEMATOLOGY ONCOLOGY | Facility: HOSPITAL | Age: 70
End: 2020-09-14
Payer: COMMERCIAL

## 2020-09-14 VITALS
TEMPERATURE: 97.3 F | DIASTOLIC BLOOD PRESSURE: 70 MMHG | WEIGHT: 306 LBS | HEIGHT: 74 IN | RESPIRATION RATE: 16 BRPM | OXYGEN SATURATION: 99 % | SYSTOLIC BLOOD PRESSURE: 129 MMHG | BODY MASS INDEX: 39.27 KG/M2 | HEART RATE: 71 BPM

## 2020-09-14 VITALS
OXYGEN SATURATION: 99 % | RESPIRATION RATE: 16 BRPM | HEART RATE: 62 BPM | TEMPERATURE: 97.9 F | DIASTOLIC BLOOD PRESSURE: 62 MMHG | SYSTOLIC BLOOD PRESSURE: 116 MMHG

## 2020-09-14 DIAGNOSIS — C90.00 MULTIPLE MYELOMA NOT HAVING ACHIEVED REMISSION (HCC): Primary | ICD-10-CM

## 2020-09-14 DIAGNOSIS — C90.00 MULTIPLE MYELOMA NOT HAVING ACHIEVED REMISSION (HCC): Primary | Chronic | ICD-10-CM

## 2020-09-14 PROCEDURE — 99214 OFFICE O/P EST MOD 30 MIN: CPT | Performed by: NURSE PRACTITIONER

## 2020-09-14 PROCEDURE — 3078F DIAST BP <80 MM HG: CPT | Performed by: NURSE PRACTITIONER

## 2020-09-14 PROCEDURE — 96401 CHEMO ANTI-NEOPL SQ/IM: CPT

## 2020-09-14 PROCEDURE — 3074F SYST BP LT 130 MM HG: CPT | Performed by: NURSE PRACTITIONER

## 2020-09-14 RX ORDER — DEXAMETHASONE 4 MG/1
20 TABLET ORAL ONCE
Status: COMPLETED | OUTPATIENT
Start: 2020-09-14 | End: 2020-09-14

## 2020-09-14 RX ADMIN — BORTEZOMIB 3.4 MG: 3.5 INJECTION, POWDER, LYOPHILIZED, FOR SOLUTION INTRAVENOUS; SUBCUTANEOUS at 10:47

## 2020-09-14 RX ADMIN — DEXAMETHASONE 20 MG: 4 TABLET ORAL at 10:26

## 2020-09-14 NOTE — PROGRESS NOTES
Patient received Velcade as per order, no issues  AVS provided, accompanied patient to main lobby to wait for ride

## 2020-09-14 NOTE — PLAN OF CARE
Problem: Potential for Falls  Goal: Patient will remain free of falls  Description: INTERVENTIONS:  - Assess patient frequently for physical needs  -  Identify cognitive and physical deficits and behaviors that affect risk of falls    -  [place] fall precautions as indicated by assessment   - Educate patient/family on patient safety including physical limitations  - Instruct patient to call for assistance with activity based on assessment  - Modify environment to reduce risk of injury  - Consider OT/PT consult to assist with strengthening/mobility  Outcome: Progressing

## 2020-09-15 RX ORDER — DEXTROSE 15 G/37.5G
GEL ORAL
COMMUNITY
Start: 2020-08-27 | End: 2021-10-16 | Stop reason: HOSPADM

## 2020-09-15 RX ORDER — ONDANSETRON 4 MG/1
TABLET, FILM COATED ORAL
Status: ON HOLD | COMMUNITY

## 2020-09-15 RX ORDER — INSULIN LISPRO 100 [IU]/ML
INJECTION, SOLUTION INTRAVENOUS; SUBCUTANEOUS
COMMUNITY
End: 2020-09-16 | Stop reason: ALTCHOICE

## 2020-09-15 RX ORDER — ACYCLOVIR 400 MG/1
TABLET ORAL DAILY
COMMUNITY
End: 2020-09-16 | Stop reason: ALTCHOICE

## 2020-09-16 ENCOUNTER — OFFICE VISIT (OUTPATIENT)
Dept: CARDIOLOGY CLINIC | Facility: CLINIC | Age: 70
End: 2020-09-16
Payer: COMMERCIAL

## 2020-09-16 VITALS
WEIGHT: 307 LBS | TEMPERATURE: 97.8 F | BODY MASS INDEX: 39.4 KG/M2 | DIASTOLIC BLOOD PRESSURE: 60 MMHG | SYSTOLIC BLOOD PRESSURE: 120 MMHG | HEIGHT: 74 IN | HEART RATE: 60 BPM

## 2020-09-16 DIAGNOSIS — E78.00 PURE HYPERCHOLESTEROLEMIA: ICD-10-CM

## 2020-09-16 DIAGNOSIS — I42.9 CARDIOMYOPATHY, UNSPECIFIED TYPE (HCC): ICD-10-CM

## 2020-09-16 DIAGNOSIS — I10 ESSENTIAL HYPERTENSION: Primary | ICD-10-CM

## 2020-09-16 DIAGNOSIS — I50.32 CHRONIC DIASTOLIC CONGESTIVE HEART FAILURE (HCC): ICD-10-CM

## 2020-09-16 DIAGNOSIS — I48.91 ATRIAL FIBRILLATION, UNSPECIFIED TYPE (HCC): ICD-10-CM

## 2020-09-16 PROCEDURE — 99214 OFFICE O/P EST MOD 30 MIN: CPT | Performed by: INTERNAL MEDICINE

## 2020-09-17 ENCOUNTER — TELEPHONE (OUTPATIENT)
Dept: PULMONOLOGY | Facility: CLINIC | Age: 70
End: 2020-09-17

## 2020-09-18 ENCOUNTER — OFFICE VISIT (OUTPATIENT)
Dept: PULMONOLOGY | Facility: CLINIC | Age: 70
End: 2020-09-18
Payer: COMMERCIAL

## 2020-09-18 VITALS
OXYGEN SATURATION: 99 % | TEMPERATURE: 98.2 F | WEIGHT: 307 LBS | HEIGHT: 74 IN | SYSTOLIC BLOOD PRESSURE: 130 MMHG | HEART RATE: 71 BPM | DIASTOLIC BLOOD PRESSURE: 80 MMHG | RESPIRATION RATE: 18 BRPM | BODY MASS INDEX: 39.4 KG/M2

## 2020-09-18 DIAGNOSIS — J45.40 MODERATE PERSISTENT ASTHMA WITHOUT COMPLICATION: Chronic | ICD-10-CM

## 2020-09-18 DIAGNOSIS — G47.33 OSA (OBSTRUCTIVE SLEEP APNEA): Chronic | ICD-10-CM

## 2020-09-18 DIAGNOSIS — Z23 NEED FOR INFLUENZA VACCINATION: Primary | ICD-10-CM

## 2020-09-18 PROCEDURE — 1160F RVW MEDS BY RX/DR IN RCRD: CPT | Performed by: INTERNAL MEDICINE

## 2020-09-18 PROCEDURE — 90662 IIV NO PRSV INCREASED AG IM: CPT

## 2020-09-18 PROCEDURE — G0008 ADMIN INFLUENZA VIRUS VAC: HCPCS

## 2020-09-18 PROCEDURE — 1036F TOBACCO NON-USER: CPT | Performed by: INTERNAL MEDICINE

## 2020-09-18 PROCEDURE — 99214 OFFICE O/P EST MOD 30 MIN: CPT | Performed by: INTERNAL MEDICINE

## 2020-09-18 NOTE — ASSESSMENT & PLAN NOTE
He has a history of obstructive sleep apnea and briefly used CPAP in the past   He no longer has the machine and is not interested in pursuing repeat testing

## 2020-09-18 NOTE — PATIENT INSTRUCTIONS
Patient should continue with Advair twice daily  He was given a flu vaccine today  Follow-up in 6 months

## 2020-09-18 NOTE — ASSESSMENT & PLAN NOTE
Patient recovered from hospitalization in June from COVID-19 pneumonia  Oxygenation has improved and he is no longer needing supplemental oxygen

## 2020-09-18 NOTE — ASSESSMENT & PLAN NOTE
He is doing well from pulmonary standpoint on Advair twice daily  He should continue with this inhaler

## 2020-09-18 NOTE — PROGRESS NOTES
Pulmonary Follow Up Note   Jaydon Bender 78 y o  male MRN: [id number]  9/18/2020      Assessment/Plan: Moderate persistent asthma without complication    He is doing well from pulmonary standpoint on Advair twice daily  He should continue with this inhaler  Acute respiratory failure with hypoxia due to Pneumonia due to COVID-19 virus    Patient recovered from hospitalization in June from COVID-19 pneumonia  Oxygenation has improved and he is no longer needing supplemental oxygen  Need for influenza vaccination   Patient given flu vaccine today  NALLELY (obstructive sleep apnea)    He has a history of obstructive sleep apnea and briefly used CPAP in the past   He no longer has the machine and is not interested in pursuing repeat testing    Visit orders:    Diagnoses and all orders for this visit:    Need for influenza vaccination  -     influenza vaccine, high-dose, PF 0 7 mL (FLUZONE HIGH-DOSE)    Moderate persistent asthma without complication    NALLELY (obstructive sleep apnea)    Other orders  -     Discontinue: acyclovir (ZOVIRAX) 400 MG tablet; Daily  -     Glutose 15 40 %  -     Discontinue: insulin lispro (Admelog SoloStar) 100 units/mL injection pen; Admelog SoloStar U-100 Insulin lispro 100 unit/mL subcutaneous pen  -     ondansetron (ZOFRAN) 4 mg tablet; ondansetron HCl 4 mg tablet   TAKE 1 TABLET BY MOUTH EVERY 8 HOURS AS NEEDED      Return in about 6 months (around 3/18/2021)  History of Present Illness   HPI:  Jaydon Bender is a 78 y o  male who  Is here today for follow-up regarding asthma and obstructive sleep apnea  He was hospitalized in June with COVID-19 pneumonia  He has fully recovered from the hospitalization  He feels quite well from pulmonary perspective  He is using Advair twice daily and has not had any symptoms of cough, wheeze or sputum production    He is relatively sedentary given his prior history of left above-the-knee amputation and remains in a wheelchair at all times  He has occasional lower extremity edema with chronic stasis changes  He recently seen in the ED after falling out of bed  He did not suffer any significant injuries  Review of Systems   Constitutional: Negative for chills, fever and unexpected weight change  HENT: Negative for postnasal drip and sore throat  Eyes: Negative for visual disturbance  Respiratory:        As noted in HPI   Cardiovascular: Positive for leg swelling  Negative for chest pain  Gastrointestinal: Negative for abdominal pain, diarrhea and vomiting  Genitourinary: Negative for difficulty urinating  Skin: Negative for rash  Neurological: Negative for headaches  Hematological: Negative for adenopathy  Bruises/bleeds easily  Psychiatric/Behavioral: Negative  All other systems reviewed and are negative  Medical, Family and Social history reviewed and updated as appropriate    Historical Information   Past Medical History:   Diagnosis Date    Cancer [facility])     [facility]     Chronic kidney disease     [facility]ch[facility] obstructive pulmonary disease[facility]     Decubitus ulcer of heel     LAST ASSESSED [id number]    [facility] [address] [facility]     History of varicose veins     Hypertension     Intermittent claudication ([facility] [address] [facility]     Neuropathy     Seasonal allergies      Past Surgical History:   Procedure Laterality Date    AMPUTATION ABOVE KNEE (AKA) Left 7/31/2019    Procedure: AMPUTATION ABOVE KNEE (AKA);   Surgeon: Shelly Johnson MD;  Location:  MAIN OR;  Service: General    ANGIOPLASTY      W/ FLUOROSC ANGIOGRAPGY PERIPHERAL ARTERY ADDITIONAL  LAST ASSESSED [id number]    ANGIOPLASTY / STENTING FEMORAL      TANSCATH INTRAVASCULAR STENT PLACEMENT PERCUTANEOUS FEMORAL     COLONOSCOPY  2010    CT BONE MARROW BIOPSY AND ASPIRATION  8/9/2019    FULL THICKNESS SKIN GRAFT      TENDON REPAIR      TOE AMPUTATION Left 12/27/2018    Procedure: AMPUTATION left 4th TOE;  Surgeon: Nona Humphrey DPM;  Location: [facility] OR;  Service: Podiatry     Family History   Problem Relation Age of Onset    Other Mother         CARDIAC DISORDER     Diabetes Mother     Cancer Father     Other Family         CARDIAC DISORDER     Diabetes Family     Cancer Family     Mental illness Family     Kidney disease Sister     Diabetes Sister        Social History     Tobacco Use   Smoking Status Never Smoker   Smokeless Tobacco Never Used       Meds/Allergies     Current Outpatient Medications:     acetaminophen (TYLENOL) 500 mg tablet, Take 1 tablet (500 mg total) by mouth every 6 (six) hours as needed for mild pain, headaches or fever, Disp: 90 tablet, Rfl: 1    albuterol (PROVENTIL HFA,VENTOLIN HFA) 90 mcg/act inhaler, Inhale 2 puffs every 6 (six) hours as needed for wheezing, Disp: 1 Inhaler, Rfl: 0    Alcohol Swabs (ALCOHOL PREP) 70 % PADS, , Disp: , Rfl: 0    allopurinol (ZYLOPRIM) 300 mg tablet, TAKE ONE TABLET BY MOUTH DAILY (Patient taking differently: Take 100 mg by mouth daily ), Disp: 30 tablet, Rfl: 5    ammonium lactate (LAC-HYDRIN) 12 % lotion, APPLY TO BLE TOPICALLY DAILY, Disp: 400 g, Rfl: 2    atorvastatin (LIPITOR) 40 mg tablet, Take 1 tablet (40 mg total) by mouth daily after dinner, Disp: 30 tablet, Rfl: 0    BD AUTOSHIELD DUO 30G X 5 MM MISC, USE AS DIRECTED WITH INSULIN FOUR TIMES A DAY, Disp: 100 each, Rfl: 3    BD INSULIN SYRINGE U/F 31G X 5/16" 0 5 ML MISC, USE AS DIRECTED WITH NOVOLIN 70/30, Disp: , Rfl: 0    BD PEN NEEDLE HILARIO U/F 32G X 4 MM MISC, by Other route 3 (three) times a day, Disp: 300 each, Rfl: 1    bisacodyl (bisacodyl) 5 mg EC tablet, Take 5 mg by mouth daily as needed for constipation, Disp: , Rfl:     bisacodyl (DULCOLAX) 10 mg suppository, Insert 10 mg into the rectum as needed for constipation, Disp: , Rfl:     bortezomib (VELCADE), Infuse into a venous catheter once, Disp: , Rfl:     clotrimazole (LOTRIMIN) 1 % cream, APPLY TO BUTTOCK 2 TIMES DAILY, Disp: 45 g, Rfl: 3    Easy Touch Safety Lancets 28G MISC, USE 4 TIMES DAILY TO TEST BLOOD SUGAR, Disp: 100 each, Rfl: 5    fluticasone-salmeterol (ADVAIR DISKUS, WIXELA INHUB) 250-50 mcg/dose inhaler, Inhale 1 puff 2 (two) times a day Rinse mouth after use , Disp: 1 Inhaler, Rfl: 5    Glutose 15 40 %, , Disp: , Rfl:     insulin glargine (LANTUS SOLOSTAR) 100 units/mL injection pen, Inject 22 Units under the skin daily, Disp: 5 pen, Rfl: 5    magnesium hydroxide (MILK OF MAGNESIA) 400 mg/5 mL oral suspension, Take 30 mL by mouth daily as needed for constipation, Disp: , Rfl:     Melatonin 5 MG TABS, TAKE ONE TABLET BY MOUTH EVERY NIGHT AT BEDTIME, Disp: 30 tablet, Rfl: 2    metFORMIN (GLUCOPHAGE) 1000 MG tablet, TAKE ONE TABLET BY MOUTH TWO TIMES A DAY (Patient taking differently: Take 1,000 mg by mouth daily with breakfast ), Disp: 60 tablet, Rfl: 2    metoprolol tartrate (LOPRESSOR) 25 mg tablet, Take 0 5 tablets (12 5 mg total) by mouth every 12 (twelve) hours, Disp: 30 tablet, Rfl: 0    NOVOLOG FLEXPEN 100 units/mL SOPN, INJ 5U BEFORE MEALS AND PER SS <250=NO CALL 250-300=5U,301-350= 10U,351-400=15U,401-450=20U>451 CALL FOR ORDERS UP TO 4X PER DAY, Disp: 15 mL, Rfl: 5    ondansetron (ZOFRAN) 4 mg tablet, ondansetron HCl 4 mg tablet  TAKE 1 TABLET BY MOUTH EVERY 8 HOURS AS NEEDED, Disp: , Rfl:     torsemide (DEMADEX) 10 mg tablet, TAKE 2 TABLETS BY MOUTH TWO TIMES A DAY, Disp: 120 tablet, Rfl: 2    warfarin (COUMADIN) 7 5 mg tablet, 7 5 mg on August 12th and 13th then INR on August 14th (Patient taking differently: Take 5 mg by mouth daily Takes 1 5 tabs at HS every Sunday, Tuesday, Thursday     Takes 2 tabs HS every Monday, Wednesday, Friday and Saturday), Disp: 30 tablet, Rfl: 0    Insulin Pen Needle 31G X 5 MM MISC, by Does not apply route 2 (two) times a day for 50 days, Disp: 100 each, Rfl: 0  Allergies   Allergen Reactions    Latex Rash       Vitals: Blood pressure 130/80, pulse 71, temperature 98 2 °F (36 8 °C), temperature source Tympanic, resp  rate 18, height 6' 2" (1 88 m), weight (!) 139 kg (307 lb), SpO2 99 %  Body mass index is 39 42 kg/m²  Oxygen Therapy  SpO2: 99 %    Physical Exam   Physical Exam  Constitutional:       General: He is not in acute distress  HENT:      Head: Normocephalic  Mouth/Throat:      Pharynx: No oropharyngeal exudate  Eyes:      General: No scleral icterus  Pupils: Pupils are equal, round, and reactive to light  Neck:      Musculoskeletal: Neck supple  Vascular: No JVD  Cardiovascular:      Rate and Rhythm: Normal rate and regular rhythm  Pulmonary:      Breath sounds: No wheezing or rhonchi  Comments:  Decreased breath sounds throughout  Abdominal:      Palpations: Abdomen is soft  Tenderness: There is no abdominal tenderness  Musculoskeletal:      Comments:  Status post left AKA, right lower extremity with chronic stasis changes and few areas of open wounds   Lymphadenopathy:      Cervical: No cervical adenopathy  Skin:     General: Skin is warm and dry  Findings: Bruising present  Comments:   Bruise under the left eye   Neurological:      Mental Status: He is alert and oriented to person, place, and time  Labs: I have personally reviewed pertinent lab results  Lab Results   Component Value Date    WBC 7 21 08/04/2020    HGB 13 3 08/30/2020    HCT 39 08/30/2020    MCV 93 08/04/2020     08/04/2020     Lab Results   Component Value Date    GLUCOSE 195 (H) 08/30/2020    CALCIUM 9 2 08/04/2020     01/15/2018    K 4 2 08/04/2020    CO2 32 08/30/2020     08/04/2020    BUN 18 08/04/2020    CREATININE 1 30 08/04/2020     No results found for: IGE  Lab Results   Component Value Date    ALT 19 08/04/2020    AST 23 08/04/2020     (H) 11/03/2019    ALKPHOS 104 08/04/2020    BILITOT 0 6 01/15/2018     Imaging and other studies: I have personally reviewed pertinent reports     and I have personally reviewed pertinent films in PACS   Chest CT from 8/20/20 shows significant improvement in bilateral ground-glass airspace opacities and consolidations  There are small bilateral pleural effusions    Pulmonary function testing:  Performed  6/5/19 and personally interpreted  FEV1/FVC ratio 64%   FEV1 46% predicted  FVC 53% predicted  TLC 90 % predicted   % predicted  DLCO corrected for hemoglobin 58 % predicted  PFTs with severe obstruction and moderate air trapping  Diffusion capacity is reduced

## 2020-09-24 ENCOUNTER — TELEPHONE (OUTPATIENT)
Dept: HEMATOLOGY ONCOLOGY | Facility: CLINIC | Age: 70
End: 2020-09-24

## 2020-09-24 ENCOUNTER — ANTICOAG VISIT (OUTPATIENT)
Dept: CARDIOLOGY CLINIC | Facility: CLINIC | Age: 70
End: 2020-09-24

## 2020-09-24 DIAGNOSIS — I48.20 CHRONIC ATRIAL FIBRILLATION (HCC): ICD-10-CM

## 2020-09-24 LAB — INR PPP: 1.7 (ref 0.84–1.19)

## 2020-09-24 NOTE — TELEPHONE ENCOUNTER
Appointment Confirmation     Appointment with  infusion    Appointment date & time 9/28/11;30 am    Location [place]    Patient verbilized Understanding yes

## 2020-09-24 NOTE — PROGRESS NOTES
Tc to wellness [facility], left message, will fax to wellness center and pharmacy, increase dose, 5 5 mg tues, 7 mg other days, inr due 1 week

## 2020-09-28 ENCOUNTER — HOSPITAL ENCOUNTER (OUTPATIENT)
Dept: INFUSION CENTER | Facility: HOSPITAL | Age: 70
Discharge: HOME | End: 2020-09-28
Attending: INTERNAL MEDICINE
Payer: COMMERCIAL

## 2020-09-28 VITALS
BODY MASS INDEX: 39.78 KG/M2 | TEMPERATURE: 98.4 F | HEIGHT: 74 IN | HEART RATE: 73 BPM | DIASTOLIC BLOOD PRESSURE: 80 MMHG | WEIGHT: 310 LBS | RESPIRATION RATE: 16 BRPM | OXYGEN SATURATION: 96 % | SYSTOLIC BLOOD PRESSURE: 139 MMHG

## 2020-09-28 DIAGNOSIS — C90.00 MULTIPLE MYELOMA NOT HAVING ACHIEVED REMISSION (HCC): Primary | ICD-10-CM

## 2020-09-28 PROCEDURE — 96401 CHEMO ANTI-NEOPL SQ/IM: CPT

## 2020-09-28 RX ORDER — DEXAMETHASONE 4 MG/1
20 TABLET ORAL ONCE
Status: COMPLETED | OUTPATIENT
Start: 2020-09-28 | End: 2020-09-28

## 2020-09-28 RX ADMIN — DEXAMETHASONE 20 MG: 4 TABLET ORAL at 11:54

## 2020-09-28 RX ADMIN — BORTEZOMIB 3.4 MG: 3.5 INJECTION, POWDER, LYOPHILIZED, FOR SOLUTION INTRAVENOUS; SUBCUTANEOUS at 12:06

## 2020-10-01 ENCOUNTER — TELEPHONE (OUTPATIENT)
Dept: HEMATOLOGY ONCOLOGY | Facility: CLINIC | Age: 70
End: 2020-10-01

## 2020-10-01 LAB — INR PPP: 2.9 (ref 0.84–1.19)

## 2020-10-02 ENCOUNTER — TELEPHONE (OUTPATIENT)
Dept: HEMATOLOGY ONCOLOGY | Facility: MEDICAL CENTER | Age: 70
End: 2020-10-02

## 2020-10-02 ENCOUNTER — ANTICOAG VISIT (OUTPATIENT)
Dept: CARDIOLOGY CLINIC | Facility: CLINIC | Age: 70
End: 2020-10-02

## 2020-10-02 DIAGNOSIS — E11.641 UNCONTROLLED TYPE 2 DIABETES MELLITUS WITH HYPOGLYCEMIA AND COMA (HCC): ICD-10-CM

## 2020-10-02 DIAGNOSIS — I48.20 CHRONIC ATRIAL FIBRILLATION (HCC): ICD-10-CM

## 2020-10-02 RX ORDER — PEN NEEDLE, DIABETIC, SAFETY 30 GX3/16"
NEEDLE, DISPOSABLE MISCELLANEOUS
Qty: 100 EACH | Refills: 3 | OUTPATIENT
Start: 2020-10-02

## 2020-10-05 ENCOUNTER — HOSPITAL ENCOUNTER (OUTPATIENT)
Dept: INFUSION CENTER | Facility: HOSPITAL | Age: 70
Discharge: HOME | End: 2020-10-05
Attending: INTERNAL MEDICINE
Payer: COMMERCIAL

## 2020-10-05 VITALS
SYSTOLIC BLOOD PRESSURE: 144 MMHG | HEIGHT: 74 IN | RESPIRATION RATE: 20 BRPM | DIASTOLIC BLOOD PRESSURE: 64 MMHG | HEART RATE: 66 BPM | TEMPERATURE: 97.8 F | BODY MASS INDEX: 40.43 KG/M2 | WEIGHT: 315 LBS

## 2020-10-05 DIAGNOSIS — C90.00 MULTIPLE MYELOMA NOT HAVING ACHIEVED REMISSION (HCC): Primary | ICD-10-CM

## 2020-10-05 PROCEDURE — 96401 CHEMO ANTI-NEOPL SQ/IM: CPT

## 2020-10-05 RX ORDER — DEXAMETHASONE 4 MG/1
20 TABLET ORAL ONCE
Status: COMPLETED | OUTPATIENT
Start: 2020-10-05 | End: 2020-10-05

## 2020-10-05 RX ADMIN — DEXAMETHASONE 20 MG: 4 TABLET ORAL at 11:45

## 2020-10-05 RX ADMIN — BORTEZOMIB 3.4 MG: 3.5 INJECTION, POWDER, LYOPHILIZED, FOR SOLUTION INTRAVENOUS; SUBCUTANEOUS at 11:56

## 2020-10-08 ENCOUNTER — HOSPITAL ENCOUNTER (OUTPATIENT)
Dept: NON INVASIVE DIAGNOSTICS | Facility: CLINIC | Age: 70
Discharge: HOME | End: 2020-10-08
Payer: COMMERCIAL

## 2020-10-08 DIAGNOSIS — S78.112A ABOVE KNEE AMPUTATION OF LEFT LOWER EXTREMITY (HCC): ICD-10-CM

## 2020-10-08 DIAGNOSIS — I73.9 PERIPHERAL VASCULAR DISEASE (HCC): ICD-10-CM

## 2020-10-08 PROCEDURE — 93926 LOWER EXTREMITY STUDY: CPT | Performed by: SURGERY

## 2020-10-08 PROCEDURE — 93926 LOWER EXTREMITY STUDY: CPT

## 2020-10-08 PROCEDURE — 93922 UPR/L XTREMITY ART 2 LEVELS: CPT | Performed by: SURGERY

## 2020-10-09 ENCOUNTER — TELEPHONE (OUTPATIENT)
Dept: HEMATOLOGY ONCOLOGY | Facility: CLINIC | Age: 70
End: 2020-10-09

## 2020-10-09 DIAGNOSIS — R60.0 LOCALIZED EDEMA: ICD-10-CM

## 2020-10-09 RX ORDER — TORSEMIDE 10 MG/1
TABLET ORAL
Qty: 120 TABLET | Refills: 3 | OUTPATIENT
Start: 2020-10-09

## 2020-10-10 DIAGNOSIS — Z12.11 SCREENING FOR COLON CANCER: Primary | ICD-10-CM

## 2020-10-12 ENCOUNTER — TELEPHONE (OUTPATIENT)
Dept: HEMATOLOGY ONCOLOGY | Facility: CLINIC | Age: 70
End: 2020-10-12

## 2020-10-12 ENCOUNTER — HOSPITAL ENCOUNTER (OUTPATIENT)
Dept: INFUSION CENTER | Facility: HOSPITAL | Age: 70
Discharge: HOME | End: 2020-10-12
Attending: INTERNAL MEDICINE

## 2020-10-12 ENCOUNTER — ANTICOAG VISIT (OUTPATIENT)
Dept: CARDIOLOGY CLINIC | Facility: CLINIC | Age: 70
End: 2020-10-12

## 2020-10-12 DIAGNOSIS — C90.00 MULTIPLE MYELOMA NOT HAVING ACHIEVED REMISSION (HCC): Primary | ICD-10-CM

## 2020-10-12 DIAGNOSIS — I48.20 CHRONIC ATRIAL FIBRILLATION (HCC): ICD-10-CM

## 2020-10-12 LAB — INR PPP: 1.7 (ref 0.84–1.19)

## 2020-10-12 RX ORDER — ACYCLOVIR 400 MG/1
400 TABLET ORAL DAILY
Qty: 30 TABLET | Refills: 11 | Status: SHIPPED | OUTPATIENT
Start: 2020-10-18 | End: 2020-12-23 | Stop reason: ALTCHOICE

## 2020-10-15 LAB — INR PPP: 1.9 (ref 0.84–1.19)

## 2020-10-16 ENCOUNTER — TELEPHONE (OUTPATIENT)
Dept: CARDIOLOGY CLINIC | Facility: CLINIC | Age: 70
End: 2020-10-16

## 2020-10-16 ENCOUNTER — ANTICOAG VISIT (OUTPATIENT)
Dept: CARDIOLOGY CLINIC | Facility: CLINIC | Age: 70
End: 2020-10-16

## 2020-10-16 DIAGNOSIS — I48.20 CHRONIC ATRIAL FIBRILLATION (HCC): ICD-10-CM

## 2020-10-22 LAB — INR PPP: 3.5 (ref 0.84–1.19)

## 2020-10-23 ENCOUNTER — ANTICOAG VISIT (OUTPATIENT)
Dept: CARDIOLOGY CLINIC | Facility: CLINIC | Age: 70
End: 2020-10-23

## 2020-10-23 DIAGNOSIS — I48.20 CHRONIC ATRIAL FIBRILLATION (HCC): ICD-10-CM

## 2020-10-26 ENCOUNTER — ANTICOAG VISIT (OUTPATIENT)
Dept: CARDIOLOGY CLINIC | Facility: CLINIC | Age: 70
End: 2020-10-26

## 2020-10-26 DIAGNOSIS — I48.20 CHRONIC ATRIAL FIBRILLATION (HCC): ICD-10-CM

## 2020-10-26 LAB — INR PPP: 2 (ref 0.84–1.19)

## 2020-10-28 RX ORDER — DEXAMETHASONE 4 MG/1
20 TABLET ORAL ONCE
Status: CANCELLED
Start: 2020-11-02

## 2020-10-29 ENCOUNTER — ANTICOAG VISIT (OUTPATIENT)
Dept: CARDIOLOGY CLINIC | Facility: CLINIC | Age: 70
End: 2020-10-29

## 2020-10-29 DIAGNOSIS — I48.20 CHRONIC ATRIAL FIBRILLATION (HCC): ICD-10-CM

## 2020-10-29 LAB — INR PPP: 2.2 (ref 0.84–1.19)

## 2020-10-30 ENCOUNTER — TELEPHONE (OUTPATIENT)
Dept: HEMATOLOGY ONCOLOGY | Facility: CLINIC | Age: 70
End: 2020-10-30

## 2020-11-02 ENCOUNTER — HOSPITAL ENCOUNTER (OUTPATIENT)
Dept: INFUSION CENTER | Facility: HOSPITAL | Age: 70
Discharge: HOME | End: 2020-11-02
Attending: INTERNAL MEDICINE
Payer: COMMERCIAL

## 2020-11-02 VITALS
DIASTOLIC BLOOD PRESSURE: 76 MMHG | SYSTOLIC BLOOD PRESSURE: 138 MMHG | RESPIRATION RATE: 20 BRPM | TEMPERATURE: 96.8 F | WEIGHT: 305 LBS | BODY MASS INDEX: 39.14 KG/M2 | HEIGHT: 74 IN | HEART RATE: 68 BPM | OXYGEN SATURATION: 97 %

## 2020-11-02 DIAGNOSIS — Z78.9 NEED FOR FOLLOW-UP BY SOCIAL WORKER: Primary | ICD-10-CM

## 2020-11-02 DIAGNOSIS — C90.00 MULTIPLE MYELOMA NOT HAVING ACHIEVED REMISSION (HCC): Primary | ICD-10-CM

## 2020-11-02 LAB
ALBUMIN SERPL BCP-MCNC: 3.2 G/DL (ref 3.5–5)
ALP SERPL-CCNC: 172 U/L (ref 46–116)
ALT SERPL W P-5'-P-CCNC: 16 U/L (ref 12–78)
ANION GAP SERPL CALCULATED.3IONS-SCNC: 7 MMOL/L (ref 4–13)
AST SERPL W P-5'-P-CCNC: 20 U/L (ref 5–45)
BASOPHILS # BLD AUTO: 0.03 THOUSANDS/ΜL (ref 0–0.1)
BASOPHILS NFR BLD AUTO: 1 % (ref 0–1)
BILIRUB SERPL-MCNC: 0.6 MG/DL (ref 0.2–1)
BUN SERPL-MCNC: 22 MG/DL (ref 5–25)
CALCIUM ALBUM COR SERPL-MCNC: 9.4 MG/DL (ref 8.3–10.1)
CALCIUM SERPL-MCNC: 8.8 MG/DL (ref 8.3–10.1)
CHLORIDE SERPL-SCNC: 103 MMOL/L (ref 100–108)
CO2 SERPL-SCNC: 32 MMOL/L (ref 21–32)
CREAT SERPL-MCNC: 1.19 MG/DL (ref 0.6–1.3)
EOSINOPHIL # BLD AUTO: 0.24 THOUSAND/ΜL (ref 0–0.61)
EOSINOPHIL NFR BLD AUTO: 4 % (ref 0–6)
ERYTHROCYTE [DISTWIDTH] IN BLOOD BY AUTOMATED COUNT: 16.9 % (ref 11.6–15.1)
GFR SERPL CREATININE-BSD FRML MDRD: 62 ML/MIN/1.73SQ M
GLUCOSE SERPL-MCNC: 200 MG/DL (ref 65–140)
HCT VFR BLD AUTO: 41.5 % (ref 36.5–49.3)
HGB BLD-MCNC: 12.6 G/DL (ref 12–17)
IMM GRANULOCYTES # BLD AUTO: 0.04 THOUSAND/UL (ref 0–0.2)
IMM GRANULOCYTES NFR BLD AUTO: 1 % (ref 0–2)
LYMPHOCYTES # BLD AUTO: 1.57 THOUSANDS/ΜL (ref 0.6–4.47)
LYMPHOCYTES NFR BLD AUTO: 24 % (ref 14–44)
MCH RBC QN AUTO: 26.5 PG (ref 26.8–34.3)
MCHC RBC AUTO-ENTMCNC: 30.4 G/DL (ref 31.4–37.4)
MCV RBC AUTO: 87 FL (ref 82–98)
MONOCYTES # BLD AUTO: 0.49 THOUSAND/ΜL (ref 0.17–1.22)
MONOCYTES NFR BLD AUTO: 8 % (ref 4–12)
NEUTROPHILS # BLD AUTO: 4.06 THOUSANDS/ΜL (ref 1.85–7.62)
NEUTS SEG NFR BLD AUTO: 62 % (ref 43–75)
PLATELET # BLD AUTO: 227 THOUSANDS/UL (ref 149–390)
PMV BLD AUTO: 10.5 FL (ref 8.9–12.7)
POTASSIUM SERPL-SCNC: 4.1 MMOL/L (ref 3.5–5.3)
PROT SERPL-MCNC: 7.5 G/DL (ref 6.4–8.2)
RBC # BLD AUTO: 4.76 MILLION/UL (ref 3.88–5.62)
SODIUM SERPL-SCNC: 142 MMOL/L (ref 136–145)
WBC # BLD AUTO: 6.43 THOUSAND/UL (ref 4.31–10.16)

## 2020-11-02 PROCEDURE — 96401 CHEMO ANTI-NEOPL SQ/IM: CPT

## 2020-11-02 PROCEDURE — 85025 COMPLETE CBC W/AUTO DIFF WBC: CPT | Performed by: INTERNAL MEDICINE

## 2020-11-02 PROCEDURE — 80053 COMPREHEN METABOLIC PANEL: CPT | Performed by: INTERNAL MEDICINE

## 2020-11-02 RX ORDER — DEXAMETHASONE 4 MG/1
20 TABLET ORAL ONCE
Status: COMPLETED | OUTPATIENT
Start: 2020-11-02 | End: 2020-11-02

## 2020-11-02 RX ADMIN — BORTEZOMIB 3.4 MG: 3.5 INJECTION, POWDER, LYOPHILIZED, FOR SOLUTION INTRAVENOUS; SUBCUTANEOUS at 12:55

## 2020-11-02 RX ADMIN — DEXAMETHASONE 20 MG: 4 TABLET ORAL at 12:38

## 2020-11-04 RX ORDER — DEXAMETHASONE 4 MG/1
20 TABLET ORAL ONCE
Status: CANCELLED
Start: 2020-11-09

## 2020-11-06 ENCOUNTER — TELEPHONE (OUTPATIENT)
Dept: HEMATOLOGY ONCOLOGY | Facility: HOSPITAL | Age: 70
End: 2020-11-06

## 2020-11-06 ENCOUNTER — PATIENT OUTREACH (OUTPATIENT)
Dept: CASE MANAGEMENT | Facility: HOSPITAL | Age: 70
End: 2020-11-06

## 2020-11-09 ENCOUNTER — OFFICE VISIT (OUTPATIENT)
Dept: HEMATOLOGY ONCOLOGY | Facility: HOSPITAL | Age: 70
End: 2020-11-09
Payer: COMMERCIAL

## 2020-11-09 ENCOUNTER — ANTICOAG VISIT (OUTPATIENT)
Dept: CARDIOLOGY CLINIC | Facility: CLINIC | Age: 70
End: 2020-11-09

## 2020-11-09 ENCOUNTER — HOSPITAL ENCOUNTER (OUTPATIENT)
Dept: INFUSION CENTER | Facility: HOSPITAL | Age: 70
Discharge: HOME | End: 2020-11-09
Attending: INTERNAL MEDICINE
Payer: COMMERCIAL

## 2020-11-09 ENCOUNTER — TELEPHONE (OUTPATIENT)
Dept: HEMATOLOGY ONCOLOGY | Facility: MEDICAL CENTER | Age: 70
End: 2020-11-09

## 2020-11-09 VITALS
TEMPERATURE: 97.8 F | BODY MASS INDEX: 37.92 KG/M2 | DIASTOLIC BLOOD PRESSURE: 70 MMHG | RESPIRATION RATE: 18 BRPM | WEIGHT: 305 LBS | HEART RATE: 75 BPM | OXYGEN SATURATION: 98 % | HEIGHT: 75 IN | SYSTOLIC BLOOD PRESSURE: 146 MMHG

## 2020-11-09 VITALS
SYSTOLIC BLOOD PRESSURE: 132 MMHG | RESPIRATION RATE: 16 BRPM | DIASTOLIC BLOOD PRESSURE: 74 MMHG | OXYGEN SATURATION: 98 % | TEMPERATURE: 97.6 F | HEART RATE: 78 BPM

## 2020-11-09 DIAGNOSIS — C90.00 MULTIPLE MYELOMA NOT HAVING ACHIEVED REMISSION (HCC): Primary | ICD-10-CM

## 2020-11-09 DIAGNOSIS — C90.00 MULTIPLE MYELOMA NOT HAVING ACHIEVED REMISSION (HCC): Primary | Chronic | ICD-10-CM

## 2020-11-09 DIAGNOSIS — I48.20 CHRONIC ATRIAL FIBRILLATION (HCC): ICD-10-CM

## 2020-11-09 LAB — INR PPP: 1.7 (ref 0.84–1.19)

## 2020-11-09 PROCEDURE — 3075F SYST BP GE 130 - 139MM HG: CPT | Performed by: NURSE PRACTITIONER

## 2020-11-09 PROCEDURE — 99214 OFFICE O/P EST MOD 30 MIN: CPT | Performed by: NURSE PRACTITIONER

## 2020-11-09 PROCEDURE — 1160F RVW MEDS BY RX/DR IN RCRD: CPT | Performed by: NURSE PRACTITIONER

## 2020-11-09 PROCEDURE — 1036F TOBACCO NON-USER: CPT | Performed by: NURSE PRACTITIONER

## 2020-11-09 PROCEDURE — 96401 CHEMO ANTI-NEOPL SQ/IM: CPT

## 2020-11-09 PROCEDURE — 3078F DIAST BP <80 MM HG: CPT | Performed by: NURSE PRACTITIONER

## 2020-11-09 RX ORDER — DEXAMETHASONE 4 MG/1
20 TABLET ORAL ONCE
Status: CANCELLED
Start: 2020-12-07

## 2020-11-09 RX ORDER — DEXAMETHASONE 4 MG/1
20 TABLET ORAL ONCE
Status: CANCELLED
Start: 2020-11-23

## 2020-11-09 RX ORDER — DEXAMETHASONE 4 MG/1
20 TABLET ORAL ONCE
Status: CANCELLED
Start: 2020-12-14

## 2020-11-09 RX ORDER — ASPIRIN 81 MG
TABLET, DELAYED RELEASE (ENTERIC COATED) ORAL
Status: ON HOLD | COMMUNITY
Start: 2020-09-19

## 2020-11-09 RX ORDER — DEXAMETHASONE 4 MG/1
20 TABLET ORAL ONCE
Status: COMPLETED | OUTPATIENT
Start: 2020-11-09 | End: 2020-11-09

## 2020-11-09 RX ORDER — COLCHICINE 0.6 MG/1
TABLET, FILM COATED ORAL
COMMUNITY
Start: 2020-11-06 | End: 2021-10-26

## 2020-11-09 RX ORDER — DEXAMETHASONE 4 MG/1
20 TABLET ORAL ONCE
Status: CANCELLED
Start: 2020-11-30

## 2020-11-09 RX ADMIN — BORTEZOMIB 3.4 MG: 3.5 INJECTION, POWDER, LYOPHILIZED, FOR SOLUTION INTRAVENOUS; SUBCUTANEOUS at 12:03

## 2020-11-09 RX ADMIN — DEXAMETHASONE 20 MG: 4 TABLET ORAL at 11:45

## 2020-11-13 ENCOUNTER — TELEPHONE (OUTPATIENT)
Dept: HEMATOLOGY ONCOLOGY | Facility: CLINIC | Age: 70
End: 2020-11-13

## 2020-11-16 ENCOUNTER — HOSPITAL ENCOUNTER (OUTPATIENT)
Dept: RADIOLOGY | Facility: HOSPITAL | Age: 70
Discharge: HOME | End: 2020-11-16
Payer: COMMERCIAL

## 2020-11-16 ENCOUNTER — ANTICOAG VISIT (OUTPATIENT)
Dept: CARDIOLOGY CLINIC | Facility: CLINIC | Age: 70
End: 2020-11-16

## 2020-11-16 ENCOUNTER — TRANSCRIBE ORDERS (OUTPATIENT)
Dept: RADIOLOGY | Facility: HOSPITAL | Age: 70
End: 2020-11-16

## 2020-11-16 DIAGNOSIS — I48.20 CHRONIC ATRIAL FIBRILLATION (HCC): ICD-10-CM

## 2020-11-16 DIAGNOSIS — C90.00 MULTIPLE MYELOMA WITH FAILED REMISSION (HCC): Primary | ICD-10-CM

## 2020-11-16 DIAGNOSIS — C90.00 MULTIPLE MYELOMA WITH FAILED REMISSION (HCC): ICD-10-CM

## 2020-11-16 LAB — INR PPP: 2.3 (ref 0.84–1.19)

## 2020-11-16 PROCEDURE — 77075 RADEX OSSEOUS SURVEY COMPL: CPT

## 2020-11-23 ENCOUNTER — HOSPITAL ENCOUNTER (OUTPATIENT)
Dept: INFUSION CENTER | Facility: HOSPITAL | Age: 70
Discharge: HOME | End: 2020-11-23
Attending: INTERNAL MEDICINE
Payer: COMMERCIAL

## 2020-11-23 ENCOUNTER — ANTICOAG VISIT (OUTPATIENT)
Dept: CARDIOLOGY CLINIC | Facility: CLINIC | Age: 70
End: 2020-11-23

## 2020-11-23 VITALS
HEART RATE: 64 BPM | BODY MASS INDEX: 37.67 KG/M2 | SYSTOLIC BLOOD PRESSURE: 140 MMHG | DIASTOLIC BLOOD PRESSURE: 66 MMHG | RESPIRATION RATE: 20 BRPM | TEMPERATURE: 96.5 F | OXYGEN SATURATION: 97 % | HEIGHT: 75 IN | WEIGHT: 303 LBS

## 2020-11-23 DIAGNOSIS — I48.20 CHRONIC ATRIAL FIBRILLATION (HCC): ICD-10-CM

## 2020-11-23 DIAGNOSIS — C90.00 MULTIPLE MYELOMA NOT HAVING ACHIEVED REMISSION (HCC): Primary | ICD-10-CM

## 2020-11-23 LAB — INR PPP: 2.4 (ref 0.84–1.19)

## 2020-11-23 PROCEDURE — 96401 CHEMO ANTI-NEOPL SQ/IM: CPT

## 2020-11-23 RX ORDER — DEXAMETHASONE 4 MG/1
20 TABLET ORAL ONCE
Status: COMPLETED | OUTPATIENT
Start: 2020-11-23 | End: 2020-11-23

## 2020-11-23 RX ADMIN — BORTEZOMIB 3.4 MG: 3.5 INJECTION, POWDER, LYOPHILIZED, FOR SOLUTION INTRAVENOUS; SUBCUTANEOUS at 12:07

## 2020-11-23 RX ADMIN — DEXAMETHASONE 20 MG: 4 TABLET ORAL at 11:57

## 2020-11-24 ENCOUNTER — TELEPHONE (OUTPATIENT)
Dept: HEMATOLOGY ONCOLOGY | Facility: CLINIC | Age: 70
End: 2020-11-24

## 2020-11-27 ENCOUNTER — TELEPHONE (OUTPATIENT)
Dept: HEMATOLOGY ONCOLOGY | Facility: MEDICAL CENTER | Age: 70
End: 2020-11-27

## 2020-11-30 ENCOUNTER — HOSPITAL ENCOUNTER (OUTPATIENT)
Dept: INFUSION CENTER | Facility: HOSPITAL | Age: 70
Discharge: HOME | End: 2020-11-30
Attending: INTERNAL MEDICINE
Payer: COMMERCIAL

## 2020-11-30 ENCOUNTER — ANTICOAG VISIT (OUTPATIENT)
Dept: CARDIOLOGY CLINIC | Facility: CLINIC | Age: 70
End: 2020-11-30

## 2020-11-30 VITALS
HEART RATE: 67 BPM | BODY MASS INDEX: 37.67 KG/M2 | SYSTOLIC BLOOD PRESSURE: 119 MMHG | RESPIRATION RATE: 18 BRPM | WEIGHT: 303 LBS | TEMPERATURE: 97.1 F | OXYGEN SATURATION: 100 % | DIASTOLIC BLOOD PRESSURE: 41 MMHG | HEIGHT: 75 IN

## 2020-11-30 DIAGNOSIS — I48.20 CHRONIC ATRIAL FIBRILLATION (HCC): ICD-10-CM

## 2020-11-30 DIAGNOSIS — C90.00 MULTIPLE MYELOMA NOT HAVING ACHIEVED REMISSION (HCC): Primary | ICD-10-CM

## 2020-11-30 LAB — INR PPP: 2.4 (ref 0.84–1.19)

## 2020-11-30 PROCEDURE — 96401 CHEMO ANTI-NEOPL SQ/IM: CPT

## 2020-11-30 RX ORDER — DEXAMETHASONE 4 MG/1
20 TABLET ORAL ONCE
Status: COMPLETED | OUTPATIENT
Start: 2020-11-30 | End: 2020-11-30

## 2020-11-30 RX ADMIN — BORTEZOMIB 3.4 MG: 3.5 INJECTION, POWDER, LYOPHILIZED, FOR SOLUTION INTRAVENOUS; SUBCUTANEOUS at 11:53

## 2020-11-30 RX ADMIN — DEXAMETHASONE 20 MG: 4 TABLET ORAL at 11:26

## 2020-12-01 ENCOUNTER — TELEPHONE (OUTPATIENT)
Dept: HEMATOLOGY ONCOLOGY | Facility: CLINIC | Age: 70
End: 2020-12-01

## 2020-12-07 ENCOUNTER — HOSPITAL ENCOUNTER (OUTPATIENT)
Dept: INFUSION CENTER | Facility: HOSPITAL | Age: 70
Discharge: HOME | End: 2020-12-07
Attending: INTERNAL MEDICINE
Payer: COMMERCIAL

## 2020-12-07 VITALS
RESPIRATION RATE: 18 BRPM | HEART RATE: 69 BPM | WEIGHT: 305 LBS | SYSTOLIC BLOOD PRESSURE: 148 MMHG | DIASTOLIC BLOOD PRESSURE: 71 MMHG | HEIGHT: 75 IN | BODY MASS INDEX: 37.92 KG/M2 | OXYGEN SATURATION: 100 % | TEMPERATURE: 97.6 F

## 2020-12-07 DIAGNOSIS — C90.00 MULTIPLE MYELOMA NOT HAVING ACHIEVED REMISSION (HCC): Primary | ICD-10-CM

## 2020-12-07 PROCEDURE — 96401 CHEMO ANTI-NEOPL SQ/IM: CPT

## 2020-12-07 RX ORDER — DEXAMETHASONE 4 MG/1
20 TABLET ORAL ONCE
Status: COMPLETED | OUTPATIENT
Start: 2020-12-07 | End: 2020-12-07

## 2020-12-07 RX ADMIN — DEXAMETHASONE 20 MG: 4 TABLET ORAL at 12:12

## 2020-12-07 RX ADMIN — BORTEZOMIB 3.4 MG: 3.5 INJECTION, POWDER, LYOPHILIZED, FOR SOLUTION INTRAVENOUS; SUBCUTANEOUS at 12:47

## 2020-12-14 ENCOUNTER — HOSPITAL ENCOUNTER (OUTPATIENT)
Dept: INFUSION CENTER | Facility: HOSPITAL | Age: 70
Discharge: HOME | End: 2020-12-14
Attending: INTERNAL MEDICINE
Payer: COMMERCIAL

## 2020-12-14 VITALS
OXYGEN SATURATION: 99 % | HEART RATE: 76 BPM | WEIGHT: 304 LBS | TEMPERATURE: 97 F | DIASTOLIC BLOOD PRESSURE: 72 MMHG | RESPIRATION RATE: 18 BRPM | BODY MASS INDEX: 37.8 KG/M2 | SYSTOLIC BLOOD PRESSURE: 123 MMHG | HEIGHT: 75 IN

## 2020-12-14 DIAGNOSIS — C90.00 MULTIPLE MYELOMA NOT HAVING ACHIEVED REMISSION (HCC): Primary | ICD-10-CM

## 2020-12-14 LAB — INR PPP: 2.3 (ref 0.84–1.19)

## 2020-12-14 PROCEDURE — 96401 CHEMO ANTI-NEOPL SQ/IM: CPT

## 2020-12-14 RX ORDER — DEXAMETHASONE 4 MG/1
20 TABLET ORAL ONCE
Status: COMPLETED | OUTPATIENT
Start: 2020-12-14 | End: 2020-12-14

## 2020-12-14 RX ADMIN — BORTEZOMIB 3.4 MG: 3.5 INJECTION, POWDER, LYOPHILIZED, FOR SOLUTION INTRAVENOUS; SUBCUTANEOUS at 12:20

## 2020-12-14 RX ADMIN — DEXAMETHASONE 20 MG: 4 TABLET ORAL at 11:32

## 2020-12-15 ENCOUNTER — ANTICOAG VISIT (OUTPATIENT)
Dept: CARDIOLOGY CLINIC | Facility: CLINIC | Age: 70
End: 2020-12-15

## 2020-12-15 DIAGNOSIS — I48.20 CHRONIC ATRIAL FIBRILLATION (HCC): ICD-10-CM
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
## 2020-12-21 RX ORDER — DEXAMETHASONE 4 MG/1
20 TABLET ORAL ONCE
Status: CANCELLED
Start: 2021-01-04

## 2020-12-21 RX ORDER — DEXAMETHASONE 4 MG/1
20 TABLET ORAL ONCE
Status: CANCELLED
Start: 2020-12-28

## 2020-12-21 RX ORDER — DEXAMETHASONE 4 MG/1
20 TABLET ORAL ONCE
Status: CANCELLED
Start: 2021-01-11

## 2020-12-21 RX ORDER — DEXAMETHASONE 4 MG/1
20 TABLET ORAL ONCE
Status: CANCELLED
Start: 2021-01-18

## 2020-12-23 ENCOUNTER — OFFICE VISIT (OUTPATIENT)
Dept: VASCULAR SURGERY | Facility: CLINIC | Age: 70
End: 2020-12-23
Payer: COMMERCIAL

## 2020-12-23 ENCOUNTER — TELEPHONE (OUTPATIENT)
Dept: ADMINISTRATIVE | Facility: HOSPITAL | Age: 70
End: 2020-12-23

## 2020-12-23 VITALS
TEMPERATURE: 97.3 F | BODY MASS INDEX: 39.17 KG/M2 | SYSTOLIC BLOOD PRESSURE: 130 MMHG | HEART RATE: 70 BPM | WEIGHT: 315 LBS | DIASTOLIC BLOOD PRESSURE: 86 MMHG | HEIGHT: 75 IN

## 2020-12-23 DIAGNOSIS — I89.0 LYMPHEDEMA: Primary | Chronic | ICD-10-CM

## 2020-12-23 DIAGNOSIS — I87.331 VENOUS HYPERTENSION, CHRONIC, WITH ULCER AND INFLAMMATION, RIGHT (HCC): ICD-10-CM

## 2020-12-23 DIAGNOSIS — L97.919 VENOUS HYPERTENSION, CHRONIC, WITH ULCER AND INFLAMMATION, RIGHT (HCC): ICD-10-CM

## 2020-12-23 PROCEDURE — 3079F DIAST BP 80-89 MM HG: CPT | Performed by: SURGERY

## 2020-12-23 PROCEDURE — 1160F RVW MEDS BY RX/DR IN RCRD: CPT | Performed by: SURGERY

## 2020-12-23 PROCEDURE — 1036F TOBACCO NON-USER: CPT | Performed by: SURGERY

## 2020-12-23 PROCEDURE — 99213 OFFICE O/P EST LOW 20 MIN: CPT | Performed by: SURGERY

## 2020-12-23 PROCEDURE — 3075F SYST BP GE 130 - 139MM HG: CPT | Performed by: SURGERY

## 2020-12-23 PROCEDURE — 3008F BODY MASS INDEX DOCD: CPT | Performed by: SURGERY

## 2020-12-28 ENCOUNTER — HOSPITAL ENCOUNTER (OUTPATIENT)
Dept: INFUSION CENTER | Facility: HOSPITAL | Age: 70
Discharge: HOME | End: 2020-12-28
Attending: INTERNAL MEDICINE
Payer: COMMERCIAL

## 2020-12-28 ENCOUNTER — ANTICOAG VISIT (OUTPATIENT)
Dept: CARDIOLOGY CLINIC | Facility: CLINIC | Age: 70
End: 2020-12-28

## 2020-12-28 VITALS
WEIGHT: 304.63 LBS | BODY MASS INDEX: 37.88 KG/M2 | RESPIRATION RATE: 18 BRPM | HEIGHT: 75 IN | HEART RATE: 68 BPM | DIASTOLIC BLOOD PRESSURE: 63 MMHG | TEMPERATURE: 96.6 F | OXYGEN SATURATION: 99 % | SYSTOLIC BLOOD PRESSURE: 139 MMHG

## 2020-12-28 DIAGNOSIS — I48.20 CHRONIC ATRIAL FIBRILLATION (HCC): ICD-10-CM

## 2020-12-28 DIAGNOSIS — C90.00 MULTIPLE MYELOMA NOT HAVING ACHIEVED REMISSION (HCC): Primary | ICD-10-CM

## 2020-12-28 LAB — INR PPP: 2.2 (ref 0.84–1.19)

## 2020-12-28 PROCEDURE — 96401 CHEMO ANTI-NEOPL SQ/IM: CPT

## 2020-12-28 RX ORDER — DEXAMETHASONE 4 MG/1
20 TABLET ORAL ONCE
Status: COMPLETED | OUTPATIENT
Start: 2020-12-28 | End: 2020-12-28

## 2020-12-28 RX ADMIN — DEXAMETHASONE 20 MG: 4 TABLET ORAL at 11:47

## 2020-12-28 RX ADMIN — BORTEZOMIB 3.4 MG: 3.5 INJECTION, POWDER, LYOPHILIZED, FOR SOLUTION INTRAVENOUS; SUBCUTANEOUS at 12:09

## 2020-12-30 DIAGNOSIS — E11.641 UNCONTROLLED TYPE 2 DIABETES MELLITUS WITH HYPOGLYCEMIA AND COMA (HCC): Chronic | ICD-10-CM

## 2020-12-30 RX ORDER — INSULIN GLARGINE 100 [IU]/ML
INJECTION, SOLUTION SUBCUTANEOUS
Qty: 15 ML | Refills: 5 | OUTPATIENT
Start: 2020-12-30

## 2020-12-31 ENCOUNTER — TELEPHONE (OUTPATIENT)
Dept: HEMATOLOGY ONCOLOGY | Facility: MEDICAL CENTER | Age: 70
End: 2020-12-31

## 2021-01-04 ENCOUNTER — HOSPITAL ENCOUNTER (OUTPATIENT)
Dept: INFUSION CENTER | Facility: HOSPITAL | Age: 71
Discharge: HOME | End: 2021-01-04
Attending: INTERNAL MEDICINE
Payer: COMMERCIAL

## 2021-01-04 VITALS
BODY MASS INDEX: 37.8 KG/M2 | DIASTOLIC BLOOD PRESSURE: 59 MMHG | HEIGHT: 75 IN | TEMPERATURE: 96.9 F | OXYGEN SATURATION: 98 % | SYSTOLIC BLOOD PRESSURE: 122 MMHG | WEIGHT: 304 LBS | RESPIRATION RATE: 18 BRPM | HEART RATE: 71 BPM

## 2021-01-04 DIAGNOSIS — C90.00 MULTIPLE MYELOMA NOT HAVING ACHIEVED REMISSION (HCC): Primary | ICD-10-CM

## 2021-01-04 PROCEDURE — 96401 CHEMO ANTI-NEOPL SQ/IM: CPT

## 2021-01-04 RX ORDER — DEXAMETHASONE 4 MG/1
20 TABLET ORAL ONCE
Status: COMPLETED | OUTPATIENT
Start: 2021-01-04 | End: 2021-01-04

## 2021-01-04 RX ADMIN — BORTEZOMIB 3.4 MG: 3.5 INJECTION, POWDER, LYOPHILIZED, FOR SOLUTION INTRAVENOUS; SUBCUTANEOUS at 12:39

## 2021-01-04 RX ADMIN — DEXAMETHASONE 20 MG: 4 TABLET ORAL at 12:18

## 2021-01-04 NOTE — PROGRESS NOTES
Pt tolerated chemo injection in right abdomen  Spoke to Kelly at [facility] regarding patient's reddened left eye, eye drops have been ordered    Pt escorted in wc to waiting room for  by SV   AVS printed and given to pt

## 2021-01-04 NOTE — PLAN OF CARE
Problem: Potential for Falls  Goal: Patient will remain free of falls  Description: INTERVENTIONS:  - Assess patient frequently for physical needs  -  Identify cognitive and physical deficits and behaviors that affect risk of falls    -  [place] fall precautions as indicated by assessment   - Educate patient/family on patient safety including physical limitations  - Instruct patient to call for assistance with activity based on assessment  - Modify environment to reduce risk of injury  - Consider OT/PT consult to assist with strengthening/mobility  Outcome: Progressing

## 2021-01-11 ENCOUNTER — HOSPITAL ENCOUNTER (OUTPATIENT)
Dept: INFUSION CENTER | Facility: HOSPITAL | Age: 71
Discharge: HOME | End: 2021-01-11
Attending: INTERNAL MEDICINE
Payer: COMMERCIAL

## 2021-01-11 ENCOUNTER — OFFICE VISIT (OUTPATIENT)
Dept: HEMATOLOGY ONCOLOGY | Facility: HOSPITAL | Age: 71
End: 2021-01-11
Payer: COMMERCIAL

## 2021-01-11 VITALS
TEMPERATURE: 99.1 F | HEART RATE: 72 BPM | DIASTOLIC BLOOD PRESSURE: 76 MMHG | RESPIRATION RATE: 14 BRPM | SYSTOLIC BLOOD PRESSURE: 128 MMHG | OXYGEN SATURATION: 99 %

## 2021-01-11 VITALS
HEART RATE: 59 BPM | TEMPERATURE: 97.4 F | OXYGEN SATURATION: 97 % | SYSTOLIC BLOOD PRESSURE: 126 MMHG | RESPIRATION RATE: 16 BRPM | WEIGHT: 304.01 LBS | BODY MASS INDEX: 37.8 KG/M2 | DIASTOLIC BLOOD PRESSURE: 63 MMHG | HEIGHT: 75 IN

## 2021-01-11 DIAGNOSIS — C90.00 MULTIPLE MYELOMA NOT HAVING ACHIEVED REMISSION (HCC): Primary | ICD-10-CM

## 2021-01-11 DIAGNOSIS — R10.32 LEFT LOWER QUADRANT ABDOMINAL PAIN: ICD-10-CM

## 2021-01-11 DIAGNOSIS — R93.89 ABNORMAL CT OF THE CHEST: ICD-10-CM

## 2021-01-11 PROCEDURE — 1160F RVW MEDS BY RX/DR IN RCRD: CPT | Performed by: INTERNAL MEDICINE

## 2021-01-11 PROCEDURE — 3074F SYST BP LT 130 MM HG: CPT | Performed by: INTERNAL MEDICINE

## 2021-01-11 PROCEDURE — 99214 OFFICE O/P EST MOD 30 MIN: CPT | Performed by: INTERNAL MEDICINE

## 2021-01-11 PROCEDURE — 3078F DIAST BP <80 MM HG: CPT | Performed by: INTERNAL MEDICINE

## 2021-01-11 PROCEDURE — 1036F TOBACCO NON-USER: CPT | Performed by: INTERNAL MEDICINE

## 2021-01-11 PROCEDURE — 96401 CHEMO ANTI-NEOPL SQ/IM: CPT

## 2021-01-11 RX ORDER — DEXAMETHASONE 4 MG/1
20 TABLET ORAL ONCE
Status: COMPLETED | OUTPATIENT
Start: 2021-01-11 | End: 2021-01-11

## 2021-01-11 RX ADMIN — DEXAMETHASONE 20 MG: 4 TABLET ORAL at 12:34

## 2021-01-11 RX ADMIN — BORTEZOMIB 3.4 MG: 3.5 INJECTION, POWDER, LYOPHILIZED, FOR SOLUTION INTRAVENOUS; SUBCUTANEOUS at 12:54

## 2021-01-11 NOTE — PROGRESS NOTES
Rec'd pt via wc  No complaints offered  Injection to lower right abdomen tolerated well  Awaiting transport back to facility

## 2021-01-11 NOTE — PROGRESS NOTES
Hematology/Oncology Outpatient Follow- up Note  Vipin Trinh 78 y o  male MRN: @ Encounter: [id number]        Date:  1/11/2021    Presenting Complaint/Diagnosis : Biclonal gammopathy with IgG Kappa  Bone marrow biopsy revealed multiple myeloma  HPI:    The patient was admitted to the hospital with wound infection  Jorge Contreras has a history of DM, HTN, and several other comorbid conditions   He seems to have had a slightly elevated creatinine for about the past year, seems to be slightly increasing more recently  He has also been anemic for quiet some time  Skeletal survey was negative but we did a bone marrow biopsy which showed plasma cell neoplasm consistent with myeloma so he was referred to see us    Previous Hematologic/ Oncologic History:    Oncology History   Multiple myeloma ([facility] )   9/16/2019 Initial Diagnosis    Multiple myeloma not having achieved remission ([facility] )     9/24/2019 -  Chemotherapy    iron sucrose (VENOFER) injection 200 mg, 200 mg (100 % of original dose 200 mg), Intravenous, Once, 1 of 1 cycle  Dose modification: 200 mg (original dose 200 mg, Cycle 1, Reason: Other (See Comments))  Administration: 200 mg (9/24/2019)  bortezomib (VELCADE) subcutaneous injection 3 5 mg, 1 3 mg/m2 = 3 5 mg (81 3 % of original dose 1 6 mg/m2), Subcutaneous, Once, 11 of 12 cycles  Dose modification: 1 3 mg/m2 (original dose 1 6 mg/m2, Cycle 1, Reason: Dose Not Tolerated)  Administration: 3 5 mg (9/24/2019), 3 5 mg (10/1/2019), 3 5 mg (10/8/2019), 3 5 mg (10/15/2019), 3 5 mg (1/6/2020), 3 5 mg (1/13/2020), 3 5 mg (1/20/2020), 3 5 mg (1/27/2020), 3 5 mg (2/11/2020), 3 5 mg (2/17/2020), 3 5 mg (2/24/2020), 3 5 mg (3/2/2020), 3 5 mg (3/16/2020), 3 5 mg (3/23/2020), 3 5 mg (3/30/2020), 3 5 mg (4/6/2020), 3 5 mg (4/20/2020), 3 5 mg (4/27/2020), 3 5 mg (5/4/2020), 3 5 mg (5/11/2020), 3 5 mg (5/26/2020), 3 5 mg (6/1/2020), 3 5 mg (6/8/2020), 3 5 mg (6/15/2020), 3 5 mg (7/20/2020), 3 4 mg (7/27/2020), 3 4 mg (8/24/2020), 3 5 mg (8/31/2020), 3 4 mg (9/8/2020), 3 4 mg (9/14/2020), 3 4 mg (9/28/2020), 3 4 mg (10/5/2020), 3 4 mg (11/2/2020), 3 4 mg (11/9/2020), 3 4 mg (11/23/2020), 3 4 mg (11/30/2020), 3 4 mg (12/7/2020), 3 4 mg (12/14/2020), 3 4 mg (12/28/2020), 3 4 mg (1/4/2021)         Current Hematologic/ Oncologic Treatment:    Velcade and Decadron    Interval History:    The patient returns for follow-up visit  He was last seen on November 9th and at that time blood work from November 5th was stable  His hemoglobin and CMP continue to be stable but the rest of the blood work has not been done  He had a skeletal survey November which showed no lytic lesions  Based on the information I have so far it seems he is doing well on his treatment  Again I emphasized to him the importance of having his myeloma blood work drawn at the nursing facility and them sending it to us  It was ordered his last visit  I will reorder it  Denies any nausea denies any vomiting denies any diarrhea  Otherwise is at baseline  The rest of his 14 point review of systems today was negative  A CT scan will be ordered of his abdomen since he has been having chronic left-sided abdominal pain from month which apparently has not been addressed through his doctor at the nursing facility despite being brought up  He states it is persistent  I will also order CT of the chest as he had an abnormal CT scan previously for which follow-up was recommended which is scheduled for February but we will do it at the same time as the abdomen to minimize dye load and avoid getting 2 different scans  Test Results:    Imaging: No results found      Labs:   Lab Results   Component Value Date    WBC 6 43 11/02/2020    HGB 12 6 11/02/2020    HCT 41 5 11/02/2020    MCV 87 11/02/2020     11/02/2020     Lab Results   Component Value Date     01/15/2018    K 4 1 11/02/2020     11/02/2020    CO2 32 11/02/2020    ANIONGAP 11 12/22/2015    BUN 22 11/02/2020    CREATININE 1 19 11/02/2020    GLUCOSE 195 (H) 08/30/2020    GLUF 141 (H) 02/26/2019    CALCIUM 8 8 11/02/2020    CORRECTEDCA 9 4 11/02/2020    AST 20 11/02/2020    ALT 16 11/02/2020    ALKPHOS 172 (H) 11/02/2020    PROT 7 1 01/15/2018    BILITOT 0 6 01/15/2018    EGFR 62 11/02/2020         Lab Results   Component Value Date    SPEP  01/06/2020     The SPEP shows a biclonal gammopathy  Previous immunofixation on 8/05/19 identified two IgA kappa bands  Reviewed by: Nica Rodríguez MD  ([id number])  **Electronic Signature**    UPEP  08/04/2019     The UPEP shows non-selective proteinuria  The UPEP shows a monoclonal gammopathy  Immunofixation to be performed  Reviewed by: Stormy Goodell, MD **Electronic Signature**       Lab Results   Component Value Date    IRON 49 (L) 11/01/2019    TIBC 254 11/01/2019    FERRITIN 985 (H) 07/08/2020       ROS: As stated in the history of present illness otherwise his 14 point review of systems today was negative        Active Problems:   Patient Active Problem List   Diagnosis    Diabetic foot ulcer ([address] )    Diabetes mellitus type 2, uncontrolled ([address] )    Chronic venous hypertension, right    Essential hypertension    Chronic atrial fibrillation (HCC)    Morbid obesity (HCC)    Chronic diastolic congestive heart failure (HCC)    Cardiomyopathy (HCC)    Diabetes, polyneuropathy ([address] )    GERD (gastroesophageal reflux disease)    Hyperlipidemia    Onychomycosis    Gallstones    Localized edema    Peripheral vascular disease (HCC)    SOB (shortness of breath)    NALLELY (obstructive sleep apnea)    Moderate persistent asthma without complication    Multiple myeloma (HCC)    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)    Hiatal hernia    Weakness    Type 2 acute myocardial infarction (HCC)    Chronic obstructive pulmonary disease, unspecified ([address] )    Hypertensive chronic kidney disease w stg 1-4/unsp chr kdny    Mass of psoas muscle    CKD (chronic kidney disease)    Chronic diastolic (congestive) heart failure (HCC)    Lymphedema    Rash    Difficulty in walking, not elsewhere classified    Gout    Venous insufficiency (chronic) (peripheral)    Acute respiratory failure with hypoxia due to Pneumonia due to COVID-19 virus    Sepsis due to COVID-19 pneumonia ([place] [address] )    Fall from bed    Need for influenza vaccination    Venous hypertension, chronic, with ulcer and inflammation, right ([place] [address] )       Past Medical History:   Past Medical History:   Diagnosis Date    Cancer ([place] [address] )     CHF (congestive heart failure) (HCC)     Chronic kidney disease     COPD (chronic obstructive pulmonary disease) ([place] [address] )     COVID-19     Decubitus ulcer of heel     LAST ASSESSED [id number]    Diabetes mellitus ([place] [address] )     History of varicose veins     Hypertension     [facility]termittent claudication ([facility])     Neuropathy     Seasonal allergies        Surgical History:   Past Surgical History:   Procedure Laterality Date    AMPUTATION ABOVE KNEE (AKA) Left 7/31/2019    Procedure: AMPUTATION ABOVE KNEE (AKA);   Surgeon: Jasmyne Blanchard MD;  Location: [facility];  Service: General    ANGIOPLASTY      W/ FLUOROSC ANGIOGRAPGY PERIPHERAL ARTERY ADDITIONAL  LAST ASSESSED [id number]    ANGIOPLASTY / STENTING FEMORAL      TANSCATH INTRAVASCULAR STENT PLACEMENT PERCUTANEOUS FEMORAL     COLONOSCOPY  2010    CT BONE MARROW BIOPSY AND ASPIRATION  8/9/2019    FULL THICKNESS SKIN GRAFT      TENDON REPAIR      TOE AMPUTATION Left 12/27/2018    Procedure: AMPUTATION left 4th TOE;  Surgeon: Francie Azar DPM;  Location: [facility];  Service: Podiatry       Family History:    Family History   Problem Relation Age of Onset    Other Mother         CARDIAC DISORDER     Diabetes Mother     Cancer Father     Other Family         CARDIAC DISORDER     Diabetes Family     Cancer Family     Mental illness Family     Kidney disease Sister     Diabetes Sister        Cancer-related family history includes Cancer in his family and father      Social History:   Social History     Socioeconomic History    Marital status:      Spouse name: Not on file    Number of children: 1    Years of education: Not on file    Highest education level: Not on file   Occupational History    Occupation: RETIRED   Social Needs    Financial resource strain: Not on file    Food insecurity     Worry: Not on file     Inability: Not on file   [employer] needs     Medical: Not on file     Non-medical: Not on file   Tobacco Use    Smoking status: Never Smoker    Smokeless tobacco: Never Used   Substance and Sexual Activity    Alcohol use: Not Currently    Drug use: Not Currently    Sexual activity: Not Currently   Lifestyle    Physical activity     Days per week: Not on file     Minutes per session: Not on file    Stress: Not on file   Relationships    Social connections     Talks on phone: Not on file     Gets together: Not on file     Attends Bahai service: Not on file     Active member of club or organization: Not on file     Attends meetings of clubs or organizations: Not on file     Relationship status: Not on file    Intimate partner violence     Fear of current or ex partner: Not on file     Emotionally abused: Not on file     Physically abused: Not on file     Forced sexual activity: Not on file   Other Topics Concern    Not on file   Social History Narrative    DENIED [address]    FEELS SAFE AT HOME    LIVES WITH FAMILY - SISTER    NO LIVING WILL    POA IN EXISTENCE     SUPPORTIVE AND SAFE    One son, 2 granddaughters       Current Medications:   Current Outpatient Medications   Medication Sig Dispense Refill    albuterol (PROVENTIL HFA,VENTOLIN HFA) 90 mcg/act inhaler Inhale 2 puffs every 6 (six) hours as needed for wheezing 1 Inhaler 0    Alcohol Swabs (ALCOHOL PREP) 70 % PADS   0  ammonium lactate (LAC-HYDRIN) 12 % lotion APPLY TO BLE TOPICALLY DAILY 400 g 2    atorvastatin (LIPITOR) 40 mg tablet Take 1 tablet (40 mg total) by mouth daily after dinner 30 tablet 0    BD AUTOSHIELD DUO 30G X 5 MM MISC USE AS DIRECTED WITH INSULIN FOUR TIMES A  each 3    BD INSULIN SYRINGE U/F 31G X 5/16" 0 5 ML MISC USE AS DIRECTED WITH NOVOLIN 70/30  0    BD PEN NEEDLE HILARIO U/F 32G X 4 MM MISC by Other route 3 (three) times a day 300 each 1    bisacodyl (bisacodyl) 5 mg EC tablet Take 5 mg by mouth daily as needed for constipation      bisacodyl (DULCOLAX) 10 mg suppository Insert 10 mg into the rectum as needed for constipation      bortezomib (VELCADE) Infuse into a venous catheter once      clotrimazole (LOTRIMIN) 1 % cream APPLY TO BUTTOCK 2 TIMES DAILY 45 g 3    Colcrys 0 6 MG tablet       Easy Touch Safety Lancets 28G MISC USE 4 TIMES DAILY TO TEST BLOOD SUGAR 100 each 5    fluticasone-salmeterol (ADVAIR DISKUS, WIXELA INHUB) 250-50 mcg/dose inhaler Inhale 1 puff 2 (two) times a day Rinse mouth after use   1 Inhaler 5    Glutose 15 40 %       insulin glargine (LANTUS SOLOSTAR) 100 units/mL injection pen Inject 22 Units under the skin daily 5 pen 5    Insulin Pen Needle 31G X 5 MM MISC by Does not apply route 2 (two) times a day for 50 days 100 each 0    magnesium hydroxide (MILK OF MAGNESIA) 400 mg/5 mL oral suspension Take 30 mL by mouth daily as needed for constipation      metFORMIN (GLUCOPHAGE) 1000 MG tablet TAKE ONE TABLET BY MOUTH TWO TIMES A DAY (Patient taking differently: Take 1,000 mg by mouth daily with breakfast ) 60 tablet 2    metoprolol tartrate (LOPRESSOR) 25 mg tablet Take 0 5 tablets (12 5 mg total) by mouth every 12 (twelve) hours 30 tablet 0    multivitamin-minerals therapeutic (THERA-M)       NOVOLOG FLEXPEN 100 units/mL SOPN INJ 5U BEFORE MEALS AND PER SS <250=NO CALL 250-300=5U,301-350= 10U,351-400=15U,401-450=20U>451 CALL FOR ORDERS UP TO 4X PER DAY 15 mL 5    torsemide (DEMADEX) 10 mg tablet TAKE 2 TABLETS BY MOUTH TWO TIMES A  tablet 2    warfarin (COUMADIN) 7 5 mg tablet 7 5 mg on August 12th and 13th then INR on August 14th (Patient taking differently: Take 5 mg by mouth daily Takes 1 5 tabs at HS every Sunday, Tuesday, Thursday  Takes 2 tabs HS every Monday, Wednesday, Friday and Saturday) 30 tablet 0    acetaminophen (TYLENOL) 500 mg tablet Take 1 tablet (500 mg total) by mouth every 6 (six) hours as needed for mild pain, headaches or fever (Patient not taking: Reported on 1/11/2021) 90 tablet 1    allopurinol (ZYLOPRIM) 300 mg tablet TAKE ONE TABLET BY MOUTH DAILY (Patient not taking: No sig reported) 30 tablet 5    Melatonin 5 MG TABS TAKE ONE TABLET BY MOUTH EVERY NIGHT AT BEDTIME (Patient not taking: Reported on 1/11/2021) 30 tablet 2    ondansetron (ZOFRAN) 4 mg tablet ondansetron HCl 4 mg tablet   TAKE 1 TABLET BY MOUTH EVERY 8 HOURS AS NEEDED       No current facility-administered medications for this visit  Allergies: Allergies   Allergen Reactions    Latex Rash       Physical Exam:    There is no height or weight on file to calculate BSA  Wt Readings from Last 3 Encounters:   01/04/21 (!) 138 kg (304 lb)   12/28/20 (!) 138 kg (304 lb 10 1 oz)   12/23/20 (!) 150 kg (330 lb)        Temp Readings from Last 3 Encounters:   01/11/21 99 1 °F (37 3 °C) (Temporal)   01/04/21 (!) 96 9 °F (36 1 °C) (Temporal)   12/28/20 (!) 96 6 °F (35 9 °C) (Temporal)        BP Readings from Last 3 Encounters:   01/11/21 128/76   01/04/21 122/59   12/28/20 139/63         Pulse Readings from Last 3 Encounters:   01/11/21 72   01/04/21 71   12/28/20 68         Physical Exam     Constitutional   General appearance: No acute distress, well appearing and well nourished  Eyes   Conjunctiva and lids:  Has a subconjunctival hemorrhage   Pupils and irises: Equal, round and reactive to light      Ears, Nose, Mouth, and Throat   External inspection of ears and nose: Normal     Nasal mucosa, septum, and turbinates: Normal without edema or erythema  Oropharynx: Normal with no erythema, edema, exudate or lesions  Pulmonary   Respiratory effort: No increased work of breathing or signs of respiratory distress  Auscultation of lungs: Clear to auscultation  Cardiovascular   Palpation of heart: Normal PMI, no thrills  Auscultation of heart: Normal rate and rhythm, normal S1 and S2, without murmurs  Examination of extremities for edema and/or varicosities: Normal     Carotid pulses: Normal     Abdomen   Abdomen: Non-tender, no masses  Liver and spleen: No hepatomegaly or splenomegaly  Lymphatic   Palpation of lymph nodes in neck: No lymphadenopathy  Musculoskeletal   Gait and station:  Has had a left lower extremity amputation so in a wheelchair   Digits and nails: Normal without clubbing or cyanosis  Inspection/palpation of joints, bones, and muscles: Normal     Skin   Skin and subcutaneous tissue: Normal without rashes or lesions  Neurologic   Cranial nerves: Cranial nerves 2-12 intact  Sensation: No sensory loss  Psychiatric   Orientation to person, place, and time: Normal     Mood and affect: Normal         Assessment / Plan:       The patient is a 70-year-old male with multiple myeloma   He does have multiple comorbidities including diabetes mellitus and hypertension   We were consulted in August 2019 while he was inpatient due to recent labs revealing by clonal gammopathies with IgG kappa  Blood work revealed myeloma  He is currently on Velcade and Decadron weekly day 01/08/2015 and 22 on a 35 day cycle  He is doing well  We will continue him on this  Most recent skeletal survey showed no evidence of lytic bony lesions  Blood work has been mostly stable  His nursing facility did not do his most recent myeloma blood work so we will reorder this  I will see him back in 6 weeks    He is complaining of abdominal pain so I will order CT scan of the abdomen  Since he was supposed to have a CT of the chest done to follow up on a previously abnormal CT scan I will order this 2  This is to minimize his diet load so that both scans can be done at the same time         Goals and Barriers:  Current Goal:  Prolong Survival from myeloma  Barriers: None  Patient's Capacity to Self Care:  Patient able to self care  Portions of the record may have been created with voice recognition software   Occasional wrong word or "sound a like" substitutions may have occurred due to the inherent limitations of voice recognition software   Read the chart carefully and recognize, using context, where substitutions have occurred

## 2021-01-18 ENCOUNTER — HOSPITAL ENCOUNTER (OUTPATIENT)
Dept: INFUSION CENTER | Facility: HOSPITAL | Age: 71
Discharge: HOME | End: 2021-01-18
Attending: INTERNAL MEDICINE
Payer: COMMERCIAL

## 2021-01-18 ENCOUNTER — ANTICOAG VISIT (OUTPATIENT)
Dept: CARDIOLOGY CLINIC | Facility: CLINIC | Age: 71
End: 2021-01-18

## 2021-01-18 VITALS
OXYGEN SATURATION: 99 % | TEMPERATURE: 96.6 F | RESPIRATION RATE: 16 BRPM | DIASTOLIC BLOOD PRESSURE: 68 MMHG | SYSTOLIC BLOOD PRESSURE: 143 MMHG | HEART RATE: 76 BPM | WEIGHT: 305 LBS | BODY MASS INDEX: 37.92 KG/M2 | HEIGHT: 75 IN

## 2021-01-18 DIAGNOSIS — I48.20 CHRONIC ATRIAL FIBRILLATION (HCC): ICD-10-CM

## 2021-01-18 DIAGNOSIS — C90.00 MULTIPLE MYELOMA NOT HAVING ACHIEVED REMISSION (HCC): Primary | ICD-10-CM

## 2021-01-18 LAB — INR PPP: 1.9 (ref 0.84–1.19)

## 2021-01-18 PROCEDURE — 96401 CHEMO ANTI-NEOPL SQ/IM: CPT

## 2021-01-18 RX ORDER — DEXAMETHASONE 4 MG/1
20 TABLET ORAL ONCE
Status: COMPLETED | OUTPATIENT
Start: 2021-01-18 | End: 2021-01-18

## 2021-01-18 RX ADMIN — DEXAMETHASONE 20 MG: 4 TABLET ORAL at 11:27

## 2021-01-18 RX ADMIN — BORTEZOMIB 3.4 MG: 3.5 INJECTION, POWDER, LYOPHILIZED, FOR SOLUTION INTRAVENOUS; SUBCUTANEOUS at 12:02

## 2021-01-18 NOTE — PROGRESS NOTES
Tc to wellness center, spoke with nurse, Shara Deluca, will fax to increase dose, 8 mg mon, 7 mg other days of the week, inr due 1 week  Faxed same to [facility] center and pharmacy

## 2021-01-19 ENCOUNTER — TELEPHONE (OUTPATIENT)
Dept: HEMATOLOGY ONCOLOGY | Facility: CLINIC | Age: 71
End: 2021-01-19

## 2021-01-19 ENCOUNTER — HOSPITAL ENCOUNTER (OUTPATIENT)
Dept: RADIOLOGY | Facility: HOSPITAL | Age: 71
Discharge: HOME | End: 2021-01-19
Attending: INTERNAL MEDICINE
Payer: COMMERCIAL

## 2021-01-19 DIAGNOSIS — C90.00 MULTIPLE MYELOMA NOT HAVING ACHIEVED REMISSION (HCC): ICD-10-CM

## 2021-01-19 DIAGNOSIS — R10.32 LEFT LOWER QUADRANT ABDOMINAL PAIN: ICD-10-CM

## 2021-01-19 DIAGNOSIS — R10.32 LEFT LOWER QUADRANT ABDOMINAL PAIN: Primary | ICD-10-CM

## 2021-01-19 DIAGNOSIS — R93.89 ABNORMAL CT OF THE CHEST: ICD-10-CM

## 2021-01-19 PROCEDURE — 71260 CT THORAX DX C+: CPT

## 2021-01-19 PROCEDURE — 74177 CT ABD & PELVIS W/CONTRAST: CPT

## 2021-01-19 PROCEDURE — G1004 CDSM NDSC: HCPCS

## 2021-01-19 RX ADMIN — IOHEXOL 100 ML: 350 INJECTION, SOLUTION INTRAVENOUS at 16:08

## 2021-01-19 NOTE — TELEPHONE ENCOUNTER
I see no indication that the patient has pain specifically in his lower abdomen  Per Dr Johnson Kras note, the patient has pain in the abdomen, a scan was ordered for a CT chest and abdomen  Do we need to order a scan of the pelvis as well?

## 2021-01-19 NOTE — TELEPHONE ENCOUNTER
CT department calling with questions regarding CT scan scheduled for today at 4pm  If physician is investigating left lower abdominal pain a CT pelvis with contrast needs to be included     Will send to [facility] to confirm with MD and have new order placed     Call back for CT scan - [phone]

## 2021-01-25 ENCOUNTER — ANTICOAG VISIT (OUTPATIENT)
Dept: CARDIOLOGY CLINIC | Facility: CLINIC | Age: 71
End: 2021-01-25

## 2021-01-25 DIAGNOSIS — I48.20 CHRONIC ATRIAL FIBRILLATION (HCC): ICD-10-CM

## 2021-01-25 LAB — INR PPP: 2.7 (ref 0.84–1.19)

## 2021-01-25 RX ORDER — DEXAMETHASONE 4 MG/1
20 TABLET ORAL ONCE
Status: CANCELLED
Start: 2021-02-03

## 2021-01-25 RX ORDER — DEXAMETHASONE 4 MG/1
20 TABLET ORAL ONCE
Status: CANCELLED
Start: 2021-02-15

## 2021-01-25 RX ORDER — DEXAMETHASONE 4 MG/1
20 TABLET ORAL ONCE
Status: CANCELLED
Start: 2021-02-09

## 2021-01-25 RX ORDER — DEXAMETHASONE 4 MG/1
20 TABLET ORAL ONCE
Status: CANCELLED
Start: 2021-02-22

## 2021-01-25 NOTE — PROGRESS NOTES
Tc to wellness center, left message on voice mail, will fax to continue current dose of warfarin, inr due 2 weeks  Faxed to pharmacy and wellness center

## 2021-02-01 ENCOUNTER — HOSPITAL ENCOUNTER (OUTPATIENT)
Dept: INFUSION CENTER | Facility: HOSPITAL | Age: 71
Discharge: HOME | End: 2021-02-01
Attending: INTERNAL MEDICINE

## 2021-02-02 ENCOUNTER — HOSPITAL ENCOUNTER (OUTPATIENT)
Dept: INFUSION CENTER | Facility: HOSPITAL | Age: 71
Discharge: HOME | End: 2021-02-02
Attending: INTERNAL MEDICINE

## 2021-02-03 ENCOUNTER — HOSPITAL ENCOUNTER (OUTPATIENT)
Dept: INFUSION CENTER | Facility: HOSPITAL | Age: 71
Discharge: HOME | End: 2021-02-03
Attending: INTERNAL MEDICINE
Payer: COMMERCIAL

## 2021-02-03 VITALS
BODY MASS INDEX: 37.92 KG/M2 | WEIGHT: 305 LBS | HEIGHT: 75 IN | OXYGEN SATURATION: 99 % | TEMPERATURE: 97.8 F | HEART RATE: 71 BPM | RESPIRATION RATE: 16 BRPM | DIASTOLIC BLOOD PRESSURE: 66 MMHG | SYSTOLIC BLOOD PRESSURE: 143 MMHG

## 2021-02-03 DIAGNOSIS — C90.00 MULTIPLE MYELOMA NOT HAVING ACHIEVED REMISSION (HCC): Primary | ICD-10-CM

## 2021-02-03 PROCEDURE — 96401 CHEMO ANTI-NEOPL SQ/IM: CPT

## 2021-02-03 RX ORDER — DEXAMETHASONE 4 MG/1
20 TABLET ORAL ONCE
Status: COMPLETED | OUTPATIENT
Start: 2021-02-03 | End: 2021-02-03

## 2021-02-03 RX ADMIN — BORTEZOMIB 3.4 MG: 3.5 INJECTION, POWDER, LYOPHILIZED, FOR SOLUTION INTRAVENOUS; SUBCUTANEOUS at 10:28

## 2021-02-03 RX ADMIN — DEXAMETHASONE 20 MG: 4 TABLET ORAL at 09:36

## 2021-02-03 NOTE — PLAN OF CARE
Problem: Potential for Falls  Goal: Patient will remain free of falls  Description: INTERVENTIONS:  - Assess patient frequently for physical needs  -  Identify cognitive and physical deficits and behaviors that affect risk of falls    -  [place] fall precautions as indicated by assessment   - Educate patient/family on patient safety including physical limitations  - Instruct patient to call for assistance with activity based on assessment  - Modify environment to reduce risk of injury  - Consider OT/PT consult to assist with strengthening/mobility  Outcome: Progressing

## 2021-02-03 NOTE — PROGRESS NOTES
Pt tolerated velcade injection to rt abdomen  AVS, lab results and schedule printed and provided to patient  Included handwritten note for NH to please draw myeloma labs  Pt exited unit via wheelchair

## 2021-02-08 ENCOUNTER — ANTICOAG VISIT (OUTPATIENT)
Dept: CARDIOLOGY CLINIC | Facility: CLINIC | Age: 71
End: 2021-02-08

## 2021-02-08 DIAGNOSIS — I48.20 CHRONIC ATRIAL FIBRILLATION (HCC): ICD-10-CM

## 2021-02-08 LAB — INR PPP: 2.1 (ref 0.84–1.19)

## 2021-02-08 NOTE — PROGRESS NOTES
Called and faxed to [facility] and faxed to [address] in 2 fabian 8 mg HENDRICKSON F 7 others

## 2021-02-09 ENCOUNTER — HOSPITAL ENCOUNTER (OUTPATIENT)
Dept: INFUSION CENTER | Facility: HOSPITAL | Age: 71
Discharge: HOME | End: 2021-02-09
Attending: INTERNAL MEDICINE
Payer: COMMERCIAL

## 2021-02-09 VITALS
HEART RATE: 72 BPM | OXYGEN SATURATION: 97 % | DIASTOLIC BLOOD PRESSURE: 76 MMHG | WEIGHT: 304 LBS | HEIGHT: 75 IN | TEMPERATURE: 97.8 F | BODY MASS INDEX: 37.8 KG/M2 | SYSTOLIC BLOOD PRESSURE: 134 MMHG | RESPIRATION RATE: 16 BRPM

## 2021-02-09 DIAGNOSIS — C90.00 MULTIPLE MYELOMA NOT HAVING ACHIEVED REMISSION (HCC): Primary | ICD-10-CM

## 2021-02-09 PROCEDURE — 96401 CHEMO ANTI-NEOPL SQ/IM: CPT

## 2021-02-09 RX ORDER — DEXAMETHASONE 4 MG/1
20 TABLET ORAL ONCE
Status: COMPLETED | OUTPATIENT
Start: 2021-02-09 | End: 2021-02-09

## 2021-02-09 RX ADMIN — DEXAMETHASONE 20 MG: 4 TABLET ORAL at 08:54

## 2021-02-09 RX ADMIN — BORTEZOMIB 3.4 MG: 3.5 INJECTION, POWDER, LYOPHILIZED, FOR SOLUTION INTRAVENOUS; SUBCUTANEOUS at 09:12

## 2021-02-09 NOTE — PROGRESS NOTES
Patient here for velcade  Glucose noted to be 460, spoke to nurse Kevin Martínez, [address] [facility] MD addressed with adjustment in insulin    velcade given in left abdomen, no issues  Dressing CD&I  Patient taken to waiting area via HBAVIK [address] 23, Kevin Martínez the  notified for pick-up

## 2021-02-15 ENCOUNTER — HOSPITAL ENCOUNTER (OUTPATIENT)
Dept: INFUSION CENTER | Facility: HOSPITAL | Age: 71
Discharge: HOME | End: 2021-02-15
Attending: INTERNAL MEDICINE
Payer: COMMERCIAL

## 2021-02-15 VITALS
TEMPERATURE: 97.1 F | HEIGHT: 75 IN | OXYGEN SATURATION: 98 % | HEART RATE: 79 BPM | DIASTOLIC BLOOD PRESSURE: 67 MMHG | WEIGHT: 304 LBS | BODY MASS INDEX: 37.8 KG/M2 | RESPIRATION RATE: 16 BRPM | SYSTOLIC BLOOD PRESSURE: 124 MMHG

## 2021-02-15 DIAGNOSIS — C90.00 MULTIPLE MYELOMA NOT HAVING ACHIEVED REMISSION (HCC): Primary | ICD-10-CM

## 2021-02-15 PROCEDURE — 96401 CHEMO ANTI-NEOPL SQ/IM: CPT

## 2021-02-15 RX ORDER — DEXAMETHASONE 4 MG/1
20 TABLET ORAL ONCE
Status: COMPLETED | OUTPATIENT
Start: 2021-02-15 | End: 2021-02-15

## 2021-02-15 RX ADMIN — BORTEZOMIB 3.4 MG: 3.5 INJECTION, POWDER, LYOPHILIZED, FOR SOLUTION INTRAVENOUS; SUBCUTANEOUS at 12:12

## 2021-02-15 RX ADMIN — DEXAMETHASONE 20 MG: 4 TABLET ORAL at 11:36

## 2021-02-15 NOTE — PROGRESS NOTES
Patient tolerated velcade treatment today with no adverse reactions  Pt educated to notify MD with s/s of reactions including SOB, Rash, and hives Patient gave verbal understanding   Patient then d/cd home via wheelchair

## 2021-02-15 NOTE — PLAN OF CARE
Problem: Potential for Falls  Goal: Patient will remain free of falls  Description: INTERVENTIONS:  - Assess patient frequently for physical needs  -  Identify cognitive and physical deficits and behaviors that affect risk of falls    -  [place] fall precautions as indicated by assessment   - Educate patient/family on patient safety including physical limitations  - Instruct patient to call for assistance with activity based on assessment  - Modify environment to reduce risk of injury  - Consider OT/PT consult to assist with strengthening/mobility  Outcome: Progressing

## 2021-02-20 NOTE — PROGRESS NOTES
Hematology/Oncology Outpatient Follow- up Note  Diego Foot 1950, [id number]  2/22/2021        Chief Complaint   Patient presents with    Follow-up       HPI:  Shira Marin a 80 yo male with multiple comorbidities including diabetes and hypertension with bone marrow biopsy proven multiple myeloma  He was referred to us in 8/2019 after 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  He was noted to have elevated kidney function and anemia which prompted work up for multiple myeloma  His skeletal survey was negative  He underwent bone marrow biopsy confirming multiple myeloma  He was initiated on treatment with with Velcade and Decadron weekly on a 35 day schedule   Velcade is 1 3 milligrams/meter squared and Decadron is 20 mg p o  On days 1, 8, 15, 22      Previous Hematologic/ Oncologic History:    Oncology History   Multiple myeloma ([facility] )   9/16/2019 Initial Diagnosis    Multiple myeloma not having achieved remission ([facility] )     9/24/2019 -  Chemotherapy    iron sucrose (VENOFER) injection 200 mg, 200 mg (100 % of original dose 200 mg), Intravenous, Once, 1 of 1 cycle  Dose modification: 200 mg (original dose 200 mg, Cycle 1, Reason: Other (See Comments))  Administration: 200 mg (9/24/2019)  bortezomib (VELCADE) subcutaneous injection 3 5 mg, 1 3 mg/m2 = 3 5 mg (81 3 % of original dose 1 6 mg/m2), Subcutaneous, Once, 12 of 18 cycles  Dose modification: 1 3 mg/m2 (original dose 1 6 mg/m2, Cycle 1, Reason: Dose Not Tolerated)  Administration: 3 5 mg (9/24/2019), 3 5 mg (10/1/2019), 3 5 mg (10/8/2019), 3 5 mg (10/15/2019), 3 5 mg (1/6/2020), 3 5 mg (1/13/2020), 3 5 mg (1/20/2020), 3 5 mg (1/27/2020), 3 5 mg (2/11/2020), 3 5 mg (2/17/2020), 3 5 mg (2/24/2020), 3 5 mg (3/2/2020), 3 5 mg (3/16/2020), 3 5 mg (3/23/2020), 3 5 mg (3/30/2020), 3 5 mg (4/6/2020), 3 5 mg (4/20/2020), 3 5 mg (4/27/2020), 3 5 mg (5/4/2020), 3 5 mg (5/11/2020), 3 5 mg (5/26/2020), 3 5 mg (6/1/2020), 3 5 mg (2020), 3 5 mg (6/15/2020), 3 5 mg (2020), 3 4 mg (2020), 3 4 mg (2020), 3 5 mg (2020), 3 4 mg (2020), 3 4 mg (2020), 3 4 mg (2020), 3 4 mg (10/5/2020), 3 4 mg (2020), 3 4 mg (2020), 3 4 mg (2020), 3 4 mg (2020), 3 4 mg (2020), 3 4 mg (2020), 3 4 mg (2020), 3 4 mg (2021), 3 4 mg (2021), 3 4 mg (2021), 3 4 mg (2/3/2021), 3 4 mg (2021), 3 4 mg (2/15/2021)         Current Hematologic/ Oncologic Treatment:    Velcade and Decadron    ECO - Symptomatic, <50% confined to bed    Interval History:   The patient presents for routine follow up  He was last seen by Dr Dutch Sorto on 21  Most recent blood work completed on 21 was reviewed  Calcium 9, creatinine 1 22  CBC acceptable range  Hemoglobin 12 1  Myeloma labs are stable  No M spike  Beta-2 3 7, free light chain ratio normal, IgM mildly low 37  IgG and IgA normal    He is due for treatment today  Overall, patient states he is feeling fairly well, at his baseline  Appetite remains good  No N/V  Occasional constipation  He was endorsing chronic left sided abdominal pain at his last visit, this has improved  imaging was ordered  CT CAP completed on 21 findings demonstrated no evidence of discrete lytic osseous lesions or metastatic disease, stable tiny upper lobe pulmonary nodules and improving bibasilar aeration, resolution of small pleural effusions  Of note, there was mention of equivocal perivesical fat infiltration with minimal bladder wall thickening  Correlate clinically for cystitis and if relevant with urinalysis  He did have a UA drawn on  and results were concerning for UTI with positive nitrates, bacteria and leukocytes esterase  He states he thinks he was prescribed an antibiotic  He is still having urgency  Denies hematuria or dysuria           Cancer Staging:  Cancer Staging  No matching staging information was found for the patient  Molecular Testing:         Test Results:    Imaging: No results found  Labs:   Lab Results   Component Value Date    WBC 6 43 11/02/2020    HGB 12 6 11/02/2020    HCT 41 5 11/02/2020    MCV 87 11/02/2020     11/02/2020     Lab Results   Component Value Date     01/15/2018    K 4 1 11/02/2020     11/02/2020    CO2 32 11/02/2020    ANIONGAP 11 12/22/2015    BUN 22 11/02/2020    CREATININE 1 19 11/02/2020    GLUCOSE 195 (H) 08/30/2020    GLUF 141 (H) 02/26/2019    CALCIUM 8 8 11/02/2020    CORRECTEDCA 9 4 11/02/2020    AST 20 11/02/2020    ALT 16 11/02/2020    ALKPHOS 172 (H) 11/02/2020    PROT 7 1 01/15/2018    BILITOT 0 6 01/15/2018    EGFR 62 11/02/2020           Review of Systems   Gastrointestinal: Positive for constipation  Genitourinary: Positive for frequency  Negative for hematuria  Musculoskeletal: Positive for arthralgias, back pain and gait problem (L AKA, uses WC)           Active Problems:   Patient Active Problem List   Diagnosis    Diabetic foot ulcer ([facility] )    Diabetes mellitus type 2, uncontrolled ([facility] )    Chronic venous hypertension, right    Essential hypertension    Chronic atrial fibrillation (HCC)    Morbid obesity (HCC)    Chronic diastolic congestive heart failure (HCC)    Cardiomyopathy (HCC)    Diabetes, polyneuropathy ([facility] )    GERD (gastroesophageal reflux disease)    Hyperlipidemia    Onychomycosis    Gallstones    Localized edema    Peripheral vascular disease (HCC)    SOB (shortness of breath)    NALLELY (obstructive sleep apnea)    Moderate persistent asthma without complication    Multiple myeloma (HCC)    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)    Hiatal hernia    Weakness    Type 2 acute myocardial infarction (HCC)    Chronic obstructive pulmonary disease, unspecified ([facility] )    Hypertensive chronic kidney disease w stg 1-4/unsp chr kdny    Mass of psoas muscle    CKD (chronic kidney disease)    Chronic diastolic (congestive) heart failure (HCC)    Lymphedema    Rash    Difficulty in walking, not elsewhere classified    Gout    Venous insufficiency (chronic) (peripheral)    Acute respiratory failure with hypoxia due to Pneumonia due to COVID-19 virus    Sepsis due to COVID-19 pneumonia ([place] [address] )    Fall from bed    Need for influenza vaccination    Venous hypertension, chronic, with ulcer and inflammation, right ([place] [address] )       Past Medical History:   Past Medical History:   Diagnosis Date    Cancer ([place] [address] )     CHF (congestive heart failure) (HCC)     Chronic kidney disease     COPD (chronic obstructive pulmonary disease) ([place] [address] )     COVID-19     Decubitus ulcer of heel     LAST ASSESSED [id number]    Diabetes mellitus ([place] [address] )     History of varicose veins     Hypertension     Intermittent claudication (HCC)     Neuropathy     Seasonal allergies        Surgical History:   Past Surgical History:   Procedure Laterality Date    AMPUTATION ABOVE KNEE (AKA) Left 7/31/2019    Procedure: AMPUTATION ABOVE KNEE (AKA);   Surgeon: Clemente Clinton MD;  Location: [facility];  Service: General    ANGIOPLASTY      W/ FLUOROSC ANGIOGRAPGY PERIPHERAL ARTERY ADDITIONAL  LAST ASSESSED [id number]    ANGIOPLASTY / STENTING FEMORAL      TANSCATH INTRAVASCULAR STENT PLACEMENT PERCUTANEOUS FEMORAL     COLONOSCOPY  2010    CT BONE MARROW BIOPSY AND ASPIRATION  8/9/2019    FULL THICKNESS SKIN GRAFT      TENDON REPAIR      TOE AMPUTATION Left 12/27/2018    Procedure: AMPUTATION left 4th TOE;  Surgeon: Kacey Montenegro DPM;  Location[facility];  Service: Podiatry       Family History:    Family History   Problem Relation Age of Onset    Other Mother         CARDIAC DISORDER     Diabetes Mother     Cancer Father     Other Family         CARDIAC DISORDER     Diabetes Family     Cancer Family     Mental illness Family     Kidney disease Sister     Diabetes Sister        Cancer-related family history includes Cancer in his family and father      Social History:   Social History     Socioeconomic History    Marital status:      Spouse name: Not on file    Number of children: 1    Years of education: Not on file    Highest education level: Not on file   Occupational History    Occupation: RETIRED   Social Needs    Financial resource strain: Not on file    Food insecurity     Worry: Not on file     Inability: Not on file   [employer] needs     Medical: Not on file     Non-medical: Not on file   Tobacco Use    Smoking status: Never Smoker    Smokeless tobacco: Never Used   Substance and Sexual Activity    Alcohol use: Not Currently    Drug use: Not Currently    Sexual activity: Not Currently   Lifestyle    Physical activity     Days per week: Not on file     Minutes per session: Not on file    Stress: Not on file   Relationships    Social connections     Talks on phone: Not on file     Gets together: Not on file     Attends [employer] service: Not on file     Active member of club or organization: Not on file     Attends meetings of clubs or organizations: Not on file     Relationship status: Not on file    Intimate partner violence     Fear of current or ex partner: Not on file     Emotionally abused: Not on file     Physically abused: Not on file     Forced sexual activity: Not on file   Other Topics Concern    Not on file   Social History Narrative    DENIED [address]    FEELS SAFE AT HOME    LIVES WITH FAMILY - SISTER    NO LIVING WILL    POA IN EXISTENCE     SUPPORTIVE AND SAFE    One son, 2 granddaughters       Current Medications:   Current Outpatient Medications   Medication Sig Dispense Refill    acetaminophen (TYLENOL) 500 mg tablet Take 1 tablet (500 mg total) by mouth every 6 (six) hours as needed for mild pain, headaches or fever 90 tablet 1    albuterol (PROVENTIL HFA,VENTOLIN HFA) 90 mcg/act inhaler Inhale 2 puffs every 6 (six) hours as needed for wheezing 1 Inhaler 0    Alcohol Swabs (ALCOHOL PREP) 70 % PADS   0    allopurinol (ZYLOPRIM) 300 mg tablet TAKE ONE TABLET BY MOUTH DAILY (Patient taking differently: 100 mg ) 30 tablet 5    ammonium lactate (LAC-HYDRIN) 12 % lotion APPLY TO BLE TOPICALLY DAILY 400 g 2    atorvastatin (LIPITOR) 40 mg tablet Take 1 tablet (40 mg total) by mouth daily after dinner 30 tablet 0    BD AUTOSHIELD DUO 30G X 5 MM MISC USE AS DIRECTED WITH INSULIN FOUR TIMES A  each 3    BD INSULIN SYRINGE U/F 31G X 5/16" 0 5 ML MISC USE AS DIRECTED WITH NOVOLIN 70/30  0    BD PEN NEEDLE HILARIO U/F 32G X 4 MM MISC by Other route 3 (three) times a day 300 each 1    bisacodyl (bisacodyl) 5 mg EC tablet Take 5 mg by mouth daily as needed for constipation      bisacodyl (DULCOLAX) 10 mg suppository Insert 10 mg into the rectum as needed for constipation      bortezomib (VELCADE) Infuse into a venous catheter once      clotrimazole (LOTRIMIN) 1 % cream APPLY TO BUTTOCK 2 TIMES DAILY 45 g 3    Colcrys 0 6 MG tablet       Easy Touch Safety Lancets 28G MISC USE 4 TIMES DAILY TO TEST BLOOD SUGAR 100 each 5    fluticasone-salmeterol (ADVAIR DISKUS, WIXELA INHUB) 250-50 mcg/dose inhaler Inhale 1 puff 2 (two) times a day Rinse mouth after use   1 Inhaler 5    Glutose 15 40 %       insulin glargine (LANTUS SOLOSTAR) 100 units/mL injection pen Inject 22 Units under the skin daily (Patient taking differently: Inject 24 Units under the skin daily at bedtime ) 5 pen 5    magnesium hydroxide (MILK OF MAGNESIA) 400 mg/5 mL oral suspension Take 30 mL by mouth daily as needed for constipation      Melatonin 5 MG TABS TAKE ONE TABLET BY MOUTH EVERY NIGHT AT BEDTIME 30 tablet 2    metFORMIN (GLUCOPHAGE) 1000 MG tablet TAKE ONE TABLET BY MOUTH TWO TIMES A DAY (Patient taking differently: Take 1,000 mg by mouth daily at bedtime ) 60 tablet 2    metoprolol tartrate (LOPRESSOR) 25 mg tablet Take 0 5 tablets (12 5 mg total) by mouth every 12 (twelve) hours 30 tablet 0    multivitamin-minerals therapeutic (THERA-M)       NOVOLOG FLEXPEN 100 units/mL SOPN INJ 5U BEFORE MEALS AND PER SS <250=NO CALL 250-300=5U,301-350= 10U,351-400=15U,401-450=20U>451 CALL FOR ORDERS UP TO 4X PER DAY 15 mL 5    ondansetron (ZOFRAN) 4 mg tablet ondansetron HCl 4 mg tablet   TAKE 1 TABLET BY MOUTH EVERY 8 HOURS AS NEEDED      torsemide (DEMADEX) 10 mg tablet TAKE 2 TABLETS BY MOUTH TWO TIMES A  tablet 2    warfarin (COUMADIN) 7 5 mg tablet 7 5 mg on August 12th and 13th then INR on August 14th (Patient taking differently: Take 5 mg by mouth daily Takes 1 5 tabs at HS every Sunday, Tuesday, Thursday  Takes 2 tabs HS every Monday, Wednesday, Friday and Saturday) 30 tablet 0    Insulin Pen Needle 31G X 5 MM MISC by Does not apply route 2 (two) times a day for 50 days 100 each 0     No current facility-administered medications for this visit  Allergies: Allergies   Allergen Reactions    Latex Rash       Physical Exam:  /72 (BP Location: Left arm)   Pulse 81   Temp 98 °F (36 7 °C) (Temporal)   Resp 16   SpO2 98%   There is no height or weight on file to calculate BSA  Wt Readings from Last 3 Encounters:   02/15/21 (!) 138 kg (304 lb)   02/09/21 (!) 138 kg (304 lb)   02/03/21 (!) 138 kg (305 lb)           Physical Exam  Constitutional:       General: He is not in acute distress  Appearance: He is well-developed  He is obese  He is not diaphoretic  HENT:      Head: Normocephalic and atraumatic  Mouth/Throat:      Pharynx: No oropharyngeal exudate  Eyes:      General: No scleral icterus  Pupils: Pupils are equal, round, and reactive to light  Neck:      Musculoskeletal: Normal range of motion and neck supple  Cardiovascular:      Rate and Rhythm: Normal rate and regular rhythm  Heart sounds: No murmur     Pulmonary:      Effort: Pulmonary effort is normal  No respiratory distress  Breath sounds: Normal breath sounds  Abdominal:      General: Bowel sounds are normal  There is no distension  Palpations: Abdomen is soft  There is no mass  Tenderness: There is no abdominal tenderness  Musculoskeletal: Normal range of motion  General: Deformity (L AKA) present  Left lower leg: No edema  Lymphadenopathy:      Cervical: No cervical adenopathy  Skin:     General: Skin is warm and dry  Neurological:      General: No focal deficit present  Mental Status: He is alert and oriented to person, place, and time  Psychiatric:         Mood and Affect: Mood normal          Behavior: Behavior normal          Thought Content: Thought content normal          Judgment: Judgment normal          Assessment / Plan:    1  Multiple myeloma not having achieved remission [facility])    The patient is a very pleasant 70 yo male with multiple comorbidities including diabetes and hypertension with bone marrow biopsy proven multiple myeloma  He was referred to us in 8/2019 after 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  He was noted to have elevated kidney function and anemia which prompted work up for multiple myeloma  His skeletal survey was negative  He underwent bone marrow biopsy confirming multiple myeloma  He was initiated on treatment with with Velcade and Decadron weekly on a 35 day schedule  Velcade is 1 3 milligrams/meter squared and Decadron is 20 mg p o  On days 1, 8, 15, 22  Most recent skeletal survey showed no evidence of lytic bony lesions  Blood work has been fairly stable  His most recent myeloma labs are also stable     CT CAP did not show any concern for malignancy  However, did question possible cystitis  Will repeat UA C&S  He continues to tolerate his treatment well  He will continue with current regimen without change  He will return for follow up in 6 weeks with repeat blood work   He will have labs done prior to treatments as planned  Patient was in agreement with plan of care  He was instructed to call with any questions or concerns prior to his next office visit         Goals and Barriers:  Current Goal:  Prolong Survival from multiple myeloma   Barriers: None  Patient's Capacity to Self Care:  Patient able to self care  Portions of the record may have been created with voice recognition software  Occasional wrong word or "sound a like" substitutions may have occurred due to the inherent limitations of voice recognition software  Read the chart carefully and recognize, using context, where substitutions have occurred

## 2021-02-22 ENCOUNTER — HOSPITAL ENCOUNTER (OUTPATIENT)
Dept: INFUSION CENTER | Facility: HOSPITAL | Age: 71
Discharge: HOME | End: 2021-02-22
Attending: INTERNAL MEDICINE
Payer: COMMERCIAL

## 2021-02-22 ENCOUNTER — ANTICOAG VISIT (OUTPATIENT)
Dept: CARDIOLOGY CLINIC | Facility: CLINIC | Age: 71
End: 2021-02-22

## 2021-02-22 ENCOUNTER — OFFICE VISIT (OUTPATIENT)
Dept: HEMATOLOGY ONCOLOGY | Facility: HOSPITAL | Age: 71
End: 2021-02-22
Payer: COMMERCIAL

## 2021-02-22 VITALS
HEART RATE: 81 BPM | OXYGEN SATURATION: 98 % | DIASTOLIC BLOOD PRESSURE: 72 MMHG | TEMPERATURE: 98 F | SYSTOLIC BLOOD PRESSURE: 126 MMHG | RESPIRATION RATE: 16 BRPM

## 2021-02-22 VITALS
OXYGEN SATURATION: 98 % | WEIGHT: 304 LBS | RESPIRATION RATE: 16 BRPM | TEMPERATURE: 97.8 F | BODY MASS INDEX: 37.8 KG/M2 | HEIGHT: 75 IN | HEART RATE: 53 BPM | SYSTOLIC BLOOD PRESSURE: 158 MMHG | DIASTOLIC BLOOD PRESSURE: 73 MMHG

## 2021-02-22 DIAGNOSIS — C90.00 MULTIPLE MYELOMA NOT HAVING ACHIEVED REMISSION (HCC): Primary | Chronic | ICD-10-CM

## 2021-02-22 DIAGNOSIS — C90.00 MULTIPLE MYELOMA NOT HAVING ACHIEVED REMISSION (HCC): Primary | ICD-10-CM

## 2021-02-22 DIAGNOSIS — N30.00 ACUTE CYSTITIS WITHOUT HEMATURIA: ICD-10-CM

## 2021-02-22 DIAGNOSIS — I48.20 CHRONIC ATRIAL FIBRILLATION (HCC): ICD-10-CM

## 2021-02-22 LAB — INR PPP: 2 (ref 0.84–1.19)

## 2021-02-22 PROCEDURE — 99214 OFFICE O/P EST MOD 30 MIN: CPT | Performed by: NURSE PRACTITIONER

## 2021-02-22 PROCEDURE — 96401 CHEMO ANTI-NEOPL SQ/IM: CPT

## 2021-02-22 RX ORDER — DEXAMETHASONE 4 MG/1
20 TABLET ORAL ONCE
Status: COMPLETED | OUTPATIENT
Start: 2021-02-22 | End: 2021-02-22

## 2021-02-22 RX ADMIN — BORTEZOMIB 3.4 MG: 3.5 INJECTION, POWDER, LYOPHILIZED, FOR SOLUTION INTRAVENOUS; SUBCUTANEOUS at 12:02

## 2021-02-22 RX ADMIN — DEXAMETHASONE 20 MG: 4 TABLET ORAL at 11:34

## 2021-02-22 NOTE — PROGRESS NOTES
Tc to [facility] [address], will fax to [facility] and pharmacy, continue current dose, inr due 3 weeks

## 2021-02-22 NOTE — PLAN OF CARE
Problem: PAIN - ADULT  Goal: Verbalizes/displays adequate comfort level or baseline comfort level  Description: Interventions:  - Encourage patient to monitor pain and request assistance  - Assess pain using appropriate pain scale  - Administer analgesics based on type and severity of pain and evaluate response  - Implement non-pharmacological measures as appropriate and evaluate response  - Consider cultural and social influences on pain and pain management  - Notify physician/advanced practitioner if interventions unsuccessful or patient reports new pain  Outcome: Progressing     Problem: INFECTION - ADULT  Goal: Absence or prevention of progression during hospitalization  Description: INTERVENTIONS:  - Assess and monitor for signs and symptoms of infection  - Monitor lab/diagnostic results  - Monitor all insertion sites, i e  indwelling lines, tubes, and drains  - Monitor endotracheal if appropriate and nasal secretions for changes in amount and color  - [place] appropriate cooling/warming therapies per order  - Administer medications as ordered  - Instruct and encourage patient and family to use good hand hygiene technique  - Identify and instruct in appropriate isolation precautions for identified infection/condition  Outcome: Progressing  Goal: Absence of fever/infection during neutropenic period  Description: INTERVENTIONS:  - Monitor WBC    Outcome: Progressing     Problem: SAFETY ADULT  Goal: Patient will remain free of falls  Description: INTERVENTIONS:  - Assess patient frequently for physical needs  -  Identify cognitive and physical deficits and behaviors that affect risk of falls    -  [place] fall precautions as indicated by assessment   - Educate patient/family on patient safety including physical limitations  - Instruct patient to call for assistance with activity based on assessment  - Modify environment to reduce risk of injury  - Consider OT/PT consult to assist with strengthening/mobility  Outcome: Progressing  Goal: Maintain or return to baseline ADL function  Description: INTERVENTIONS:  -  Assess patient's ability to carry out ADLs; assess patient's baseline for ADL function and identify physical deficits which impact ability to perform ADLs (bathing, care of mouth/teeth, toileting, grooming, dressing, etc )  - Assess/evaluate cause of self-care deficits   - Assess range of motion  - Assess patient's mobility; develop plan if impaired  - Assess patient's need for assistive devices and provide as appropriate  - Encourage maximum independence but intervene and supervise when necessary  - Involve family in performance of ADLs  - Assess for home care needs following discharge   - Consider OT consult to assist with ADL evaluation and planning for discharge  - Provide patient education as appropriate  Outcome: Progressing  Goal: Maintain or return mobility status to optimal level  Description: INTERVENTIONS:  - Assess patient's baseline mobility status (ambulation, transfers, stairs, etc )    - Identify cognitive and physical deficits and behaviors that affect mobility  - Identify mobility aids required to assist with transfers and/or ambulation (gait belt, sit-to-stand, lift, walker, cane, etc )  - [place] fall precautions as indicated by assessment  - Record patient progress and toleration of activity level on Mobility SBAR; progress patient to next Phase/Stage  - Instruct patient to call for assistance with activity based on assessment  - Consider rehabilitation consult to assist with strengthening/weightbearing, etc   Outcome: Progressing     Problem: DISCHARGE PLANNING  Goal: Discharge to home or other facility with appropriate resources  Description: INTERVENTIONS:  - Identify barriers to discharge w/patient and caregiver  - Arrange for needed discharge resources and transportation as appropriate  - Identify discharge learning needs (meds, wound care, etc )  - Arrange for interpretive services to assist at discharge as needed  - Refer to Case Management Department for coordinating discharge planning if the patient needs post-hospital services based on physician/advanced practitioner order or complex needs related to functional status, cognitive ability, or social support system  Outcome: Progressing     Problem: Knowledge Deficit  Goal: Patient/family/caregiver demonstrates understanding of disease process, treatment plan, medications, and discharge instructions  Description: Complete learning assessment and assess knowledge base    Interventions:  - Provide teaching at level of understanding  - Provide teaching via preferred learning methods  Outcome: Progressing

## 2021-02-23 ENCOUNTER — TELEPHONE (OUTPATIENT)
Dept: HEMATOLOGY ONCOLOGY | Facility: CLINIC | Age: 71
End: 2021-02-23

## 2021-02-23 NOTE — TELEPHONE ENCOUNTER
Call from Juan at [address]  Informed Juan of decadron dosing of 20 mg PO weekly  Patient's blood sugars have been elevated  Insulin dosing will be adjusted accordingly by PCP

## 2021-03-02 ENCOUNTER — TELEPHONE (OUTPATIENT)
Dept: HEMATOLOGY ONCOLOGY | Facility: CLINIC | Age: 71
End: 2021-03-02

## 2021-03-02 DIAGNOSIS — C90.00 MULTIPLE MYELOMA NOT HAVING ACHIEVED REMISSION (HCC): Primary | ICD-10-CM

## 2021-03-02 DIAGNOSIS — N39.0 URINARY TRACT INFECTION WITHOUT HEMATURIA, SITE UNSPECIFIED: Primary | ICD-10-CM

## 2021-03-02 RX ORDER — DEXAMETHASONE 4 MG/1
20 TABLET ORAL ONCE
Status: CANCELLED
Start: 2021-03-23

## 2021-03-02 RX ORDER — DEXAMETHASONE 4 MG/1
20 TABLET ORAL ONCE
Status: CANCELLED
Start: 2021-03-09

## 2021-03-02 RX ORDER — DEXAMETHASONE 4 MG/1
20 TABLET ORAL ONCE
Status: CANCELLED
Start: 2021-03-30

## 2021-03-02 RX ORDER — SULFAMETHOXAZOLE AND TRIMETHOPRIM 800; 160 MG/1; MG/1
1 TABLET ORAL EVERY 12 HOURS SCHEDULED
Qty: 14 TABLET | Refills: 0 | Status: SHIPPED | OUTPATIENT
Start: 2021-03-02 | End: 2021-03-09

## 2021-03-02 RX ORDER — DEXAMETHASONE 4 MG/1
20 TABLET ORAL ONCE
Status: CANCELLED
Start: 2021-03-16

## 2021-03-02 NOTE — TELEPHONE ENCOUNTER
Spoke to FRANCISCO Cohn the unit clerk at [facility] to advise that Bactrim has been prescribed based on recent U/A and Urine Culture  FRANCISCO Cohn stated that the patient will start the medication     ----- Message from [address] sent at 3/2/2021  7:59 AM EST -----  I didn't order this  But some how resulted to us  I will order Bactrim  Humza Sims can you call facility/patient and let patient know I have prescribed    Thanks

## 2021-03-09 ENCOUNTER — HOSPITAL ENCOUNTER (OUTPATIENT)
Dept: INFUSION CENTER | Facility: HOSPITAL | Age: 71
Discharge: HOME | End: 2021-03-09
Attending: INTERNAL MEDICINE
Payer: COMMERCIAL

## 2021-03-09 VITALS
HEART RATE: 75 BPM | BODY MASS INDEX: 37.92 KG/M2 | DIASTOLIC BLOOD PRESSURE: 66 MMHG | HEIGHT: 75 IN | OXYGEN SATURATION: 98 % | SYSTOLIC BLOOD PRESSURE: 125 MMHG | RESPIRATION RATE: 16 BRPM | TEMPERATURE: 97.4 F | WEIGHT: 305 LBS

## 2021-03-09 DIAGNOSIS — C90.00 MULTIPLE MYELOMA NOT HAVING ACHIEVED REMISSION (HCC): Primary | ICD-10-CM

## 2021-03-09 PROCEDURE — 96401 CHEMO ANTI-NEOPL SQ/IM: CPT

## 2021-03-09 RX ORDER — DEXAMETHASONE 4 MG/1
20 TABLET ORAL ONCE
Status: COMPLETED | OUTPATIENT
Start: 2021-03-09 | End: 2021-03-09

## 2021-03-09 RX ADMIN — DEXAMETHASONE 20 MG: 4 TABLET ORAL at 09:15

## 2021-03-09 RX ADMIN — BORTEZOMIB 3.4 MG: 3.5 INJECTION, POWDER, LYOPHILIZED, FOR SOLUTION INTRAVENOUS; SUBCUTANEOUS at 09:56

## 2021-03-09 NOTE — PLAN OF CARE
Problem: Potential for Falls  Goal: Patient will remain free of falls  Description: INTERVENTIONS:  - Assess patient frequently for physical needs  -  Identify cognitive and physical deficits and behaviors that affect risk of falls  -  [place] fall precautions as indicated by assessment   - Educate patient/family on patient safety including physical limitations  - Instruct patient to call for assistance with activity based on assessment  - Modify environment to reduce risk of injury  - Consider OT/PT consult to assist with strengthening/mobility  Outcome: Progressing     Problem: Knowledge Deficit  Goal: Patient/family/caregiver demonstrates understanding of disease process, treatment plan, medications, and discharge instructions  Description: Complete learning assessment and assess knowledge base    Interventions:  - Provide teaching at level of understanding  - Provide teaching via preferred learning methods  Outcome: Progressing

## 2021-03-09 NOTE — PROGRESS NOTES
Pt here for Velcade injection, given SQ Rt side of abdomen  Dsd applied  Disch via w/c to [facility] via

## 2021-03-10 ENCOUNTER — TELEPHONE (OUTPATIENT)
Dept: HEMATOLOGY ONCOLOGY | Facility: CLINIC | Age: 71
End: 2021-03-10

## 2021-03-15 ENCOUNTER — ANTICOAG VISIT (OUTPATIENT)
Dept: CARDIOLOGY CLINIC | Facility: CLINIC | Age: 71
End: 2021-03-15

## 2021-03-15 DIAGNOSIS — I48.20 CHRONIC ATRIAL FIBRILLATION (HCC): ICD-10-CM

## 2021-03-15 LAB — INR PPP: 3.2 (ref 0.84–1.19)

## 2021-03-15 NOTE — PROGRESS NOTES
Tc to [address] [place] [address], spoke with nurse,  Genesis Messina, will hold x 1 day, then decrease dose 7 mg daily, inr due 1 week  Talya Metcalf denies any new medication, bactrim completed  denies any bleeding

## 2021-03-16 ENCOUNTER — HOSPITAL ENCOUNTER (OUTPATIENT)
Dept: INFUSION CENTER | Facility: HOSPITAL | Age: 71
Discharge: HOME | End: 2021-03-16
Attending: INTERNAL MEDICINE
Payer: COMMERCIAL

## 2021-03-16 VITALS
DIASTOLIC BLOOD PRESSURE: 67 MMHG | WEIGHT: 305 LBS | BODY MASS INDEX: 37.92 KG/M2 | TEMPERATURE: 96.9 F | SYSTOLIC BLOOD PRESSURE: 169 MMHG | HEIGHT: 75 IN | RESPIRATION RATE: 18 BRPM | OXYGEN SATURATION: 99 % | HEART RATE: 63 BPM

## 2021-03-16 DIAGNOSIS — C90.00 MULTIPLE MYELOMA NOT HAVING ACHIEVED REMISSION (HCC): Primary | ICD-10-CM

## 2021-03-16 PROCEDURE — 96401 CHEMO ANTI-NEOPL SQ/IM: CPT

## 2021-03-16 RX ORDER — DEXAMETHASONE 4 MG/1
20 TABLET ORAL ONCE
Status: COMPLETED | OUTPATIENT
Start: 2021-03-16 | End: 2021-03-16

## 2021-03-16 RX ADMIN — DEXAMETHASONE 20 MG: 4 TABLET ORAL at 11:11

## 2021-03-16 RX ADMIN — BORTEZOMIB 3.4 MG: 3.5 INJECTION, POWDER, LYOPHILIZED, FOR SOLUTION INTRAVENOUS; SUBCUTANEOUS at 11:53

## 2021-03-16 NOTE — PROGRESS NOTES
Pt arrived via w/c for Velcadeinjection  Oral Decadron given  Velcade gicen SQ Lt side of abdomen, dsd applied  Disch via w/c to [employer]

## 2021-03-22 ENCOUNTER — TELEPHONE (OUTPATIENT)
Dept: HEMATOLOGY ONCOLOGY | Facility: MEDICAL CENTER | Age: 71
End: 2021-03-22

## 2021-03-23 ENCOUNTER — HOSPITAL ENCOUNTER (OUTPATIENT)
Dept: INFUSION CENTER | Facility: HOSPITAL | Age: 71
Discharge: HOME | End: 2021-03-23
Attending: INTERNAL MEDICINE
Payer: COMMERCIAL

## 2021-03-23 ENCOUNTER — ANTICOAG VISIT (OUTPATIENT)
Dept: CARDIOLOGY CLINIC | Facility: CLINIC | Age: 71
End: 2021-03-23

## 2021-03-23 VITALS
WEIGHT: 304 LBS | SYSTOLIC BLOOD PRESSURE: 120 MMHG | HEIGHT: 75 IN | HEART RATE: 64 BPM | BODY MASS INDEX: 37.8 KG/M2 | DIASTOLIC BLOOD PRESSURE: 56 MMHG | OXYGEN SATURATION: 98 % | TEMPERATURE: 97.6 F | RESPIRATION RATE: 16 BRPM

## 2021-03-23 DIAGNOSIS — I48.20 CHRONIC ATRIAL FIBRILLATION (HCC): ICD-10-CM

## 2021-03-23 DIAGNOSIS — C90.00 MULTIPLE MYELOMA NOT HAVING ACHIEVED REMISSION (HCC): Primary | ICD-10-CM

## 2021-03-23 LAB — INR PPP: 1.6 (ref 0.84–1.19)

## 2021-03-23 PROCEDURE — 96401 CHEMO ANTI-NEOPL SQ/IM: CPT

## 2021-03-23 RX ORDER — DEXAMETHASONE 4 MG/1
20 TABLET ORAL ONCE
Status: COMPLETED | OUTPATIENT
Start: 2021-03-23 | End: 2021-03-23

## 2021-03-23 RX ADMIN — DEXAMETHASONE 20 MG: 4 TABLET ORAL at 12:21

## 2021-03-23 NOTE — PROGRESS NOTES
Tc to [address] [place] [address], left message on answering machine, will increase dose, 8 mg wed, 7 mg other days of the week, inr due 1 week  Faxed to [address] [place] [address] and to Piyush Watson

## 2021-03-23 NOTE — PROGRESS NOTES
Patient tolerated treatment today in left abd SQ with no adverse reactions  Pt educated to notify MD with s/s of reactions including SOB, Rash, and hives Patient gave verbal understanding   Patient then d/cd home vie wheelchair

## 2021-03-25 ENCOUNTER — OFFICE VISIT (OUTPATIENT)
Dept: PULMONOLOGY | Facility: CLINIC | Age: 71
End: 2021-03-25
Payer: COMMERCIAL

## 2021-03-25 VITALS
DIASTOLIC BLOOD PRESSURE: 80 MMHG | BODY MASS INDEX: 37.92 KG/M2 | OXYGEN SATURATION: 99 % | SYSTOLIC BLOOD PRESSURE: 130 MMHG | WEIGHT: 305 LBS | HEART RATE: 78 BPM | HEIGHT: 75 IN | TEMPERATURE: 98 F

## 2021-03-25 DIAGNOSIS — J45.40 MODERATE PERSISTENT ASTHMA WITHOUT COMPLICATION: Primary | Chronic | ICD-10-CM

## 2021-03-25 PROBLEM — U07.1 PNEUMONIA DUE TO COVID-19 VIRUS: Status: RESOLVED | Noted: 2020-06-25 | Resolved: 2021-03-25

## 2021-03-25 PROBLEM — J12.82 PNEUMONIA DUE TO COVID-19 VIRUS: Status: RESOLVED | Noted: 2020-06-25 | Resolved: 2021-03-25

## 2021-03-25 PROCEDURE — 3008F BODY MASS INDEX DOCD: CPT | Performed by: INTERNAL MEDICINE

## 2021-03-25 PROCEDURE — 1036F TOBACCO NON-USER: CPT | Performed by: INTERNAL MEDICINE

## 2021-03-25 PROCEDURE — 99213 OFFICE O/P EST LOW 20 MIN: CPT | Performed by: INTERNAL MEDICINE

## 2021-03-25 PROCEDURE — 1160F RVW MEDS BY RX/DR IN RCRD: CPT | Performed by: INTERNAL MEDICINE

## 2021-03-25 PROCEDURE — 3079F DIAST BP 80-89 MM HG: CPT | Performed by: INTERNAL MEDICINE

## 2021-03-25 PROCEDURE — 3075F SYST BP GE 130 - 139MM HG: CPT | Performed by: INTERNAL MEDICINE

## 2021-03-25 NOTE — PROGRESS NOTES
Pulmonary Follow Up Note   Kavya Richmond 78 y o  male MRN: [id number]  3/25/2021      Assessment/Plan: Moderate persistent asthma without complication   Patient has moderate persistent asthma but has been well controlled with Advair twice daily  He has not needed his rescue inhaler on any regular basis  Will continue with this regimen  He can follow-up with me in 1 year sooner if needed  History of Present Illness   HPI:  Kavya Richmond is a 78 y o  male who Is here today for follow-up regarding moderate persistent asthma  He is doing well with Advair twice daily  He has not needed his rescue inhaler on any regular basis  He has no significant cough, wheeze or sputum production  Most his complaints today are unrelated to his pulmonary status  He has complaints of constipation and left-sided abdominal discomfort that has been troubling her for a few months  He also struggles with hyperglycemia related to his myeloma treatment  No significant edema  He is in [facility] and undergoing physical therapy at [facility]  He has a history of sleep apnea, but does not use CPAP  He is not interested in pursuing that option  Review of Systems   Constitutional: Negative for chills, fever and unexpected weight change  HENT: Negative for postnasal drip and sore throat  Eyes: Negative for visual disturbance  Respiratory:        As noted in HPI   Cardiovascular: Negative for chest pain  Gastrointestinal: Positive for abdominal pain and constipation  Negative for diarrhea and vomiting  Musculoskeletal: Negative for arthralgias  Skin: Negative for rash  Neurological: Negative for headaches  Hematological: Negative for adenopathy  Psychiatric/Behavioral: Negative  All other systems reviewed and are negative          Medical, Family and Social history reviewed and updated as appropriate    Historical Information   Past Medical History:   Diagnosis Date    Cancer [facility])     [facility]est[facility]) [facility])     Chronic kidney disease     COPD (chronic obstructive pulmonary disease) ([facility] [address] )     COVID-19     Decubitus ulcer of heel     LAST ASSESSED [id number]    Diabetes mellitus ([facility])     History of varicose veins     Hypertension     Intermittent claudication ([facility])     Neuropathy     Seasonal allergies      Past Surgical History:   Procedure Laterality Date    AMPUTATION ABOVE KNEE (AKA) Left 7/31/2019    Procedure: AMPUTATION ABOVE KNEE (AKA);   Surgeon: Anatoly Lamar MD;  Location: [facility];  Service: General    ANGIOPLASTY      W/ FLUOROSC ANGIOGRAPGY PERIPHERAL ARTERY ADDITIONAL  LAST ASSESSED [id number]    ANGIOPLASTY / STENTING FEMORAL      TANSCATH INTRAVASCULAR STENT PLACEMENT PERCUTANEOUS FEMORAL     COLONOSCOPY  2010    CT BONE MARROW BIOPSY AND ASPIRATION  8/9/2019    FULL THICKNESS SKIN GRAFT      TENDON REPAIR      TOE AMPUTATION Left 12/27/2018    Procedure: AMPUTATION left 4th TOE;  Surgeon: Nona Caballero DPM;  Location: [facility];  Service: Podiatry     Family History   Problem Relation Age of Onset    Other Mother         CARDIAC DISORDER     Diabetes Mother     Cancer Father     Other Family         CARDIAC DISORDER     Diabetes Family     Cancer Family     Mental illness Family     Kidney disease Sister     Diabetes Sister        Social History     Tobacco Use   Smoking Status Never Smoker   Smokeless Tobacco Never Used         Meds/Allergies     Current Outpatient Medications:     acetaminophen (TYLENOL) 500 mg tablet, Take 1 tablet (500 mg total) by mouth every 6 (six) hours as needed for mild pain, headaches or fever, Disp: 90 tablet, Rfl: 1    albuterol (PROVENTIL HFA,VENTOLIN HFA) 90 mcg/act inhaler, Inhale 2 puffs every 6 (six) hours as needed for wheezing, Disp: 1 Inhaler, Rfl: 0    Alcohol Swabs (ALCOHOL PREP) 70 % PADS, , Disp: , Rfl: 0    allopurinol (ZYLOPRIM) 300 mg tablet, TAKE ONE TABLET BY MOUTH DAILY (Patient taking differently: 100 mg ), Disp: 30 tablet, Rfl: 5    ammonium lactate (LAC-HYDRIN) 12 % lotion, APPLY TO BLE TOPICALLY DAILY, Disp: 400 g, Rfl: 2    atorvastatin (LIPITOR) 40 mg tablet, Take 1 tablet (40 mg total) by mouth daily after dinner, Disp: 30 tablet, Rfl: 0    BD AUTOSHIELD DUO 30G X 5 MM MISC, USE AS DIRECTED WITH INSULIN FOUR TIMES A DAY, Disp: 100 each, Rfl: 3    BD INSULIN SYRINGE U/F 31G X 5/16" 0 5 ML MISC, USE AS DIRECTED WITH NOVOLIN 70/30, Disp: , Rfl: 0    BD PEN NEEDLE HILARIO U/F 32G X 4 MM MISC, by Other route 3 (three) times a day, Disp: 300 each, Rfl: 1    bisacodyl (bisacodyl) 5 mg EC tablet, Take 5 mg by mouth daily as needed for constipation, Disp: , Rfl:     bisacodyl (DULCOLAX) 10 mg suppository, Insert 10 mg into the rectum as needed for constipation, Disp: , Rfl:     bortezomib (VELCADE), Infuse into a venous catheter once, Disp: , Rfl:     clotrimazole (LOTRIMIN) 1 % cream, APPLY TO BUTTOCK 2 TIMES DAILY, Disp: 45 g, Rfl: 3    Colcrys 0 6 MG tablet, , Disp: , Rfl:     Easy Touch Safety Lancets 28G MISC, USE 4 TIMES DAILY TO TEST BLOOD SUGAR, Disp: 100 each, Rfl: 5    fluticasone-salmeterol (ADVAIR DISKUS, WIXELA INHUB) 250-50 mcg/dose inhaler, Inhale 1 puff 2 (two) times a day Rinse mouth after use , Disp: 1 Inhaler, Rfl: 5    Glutose 15 40 %, , Disp: , Rfl:     insulin glargine (LANTUS SOLOSTAR) 100 units/mL injection pen, Inject 22 Units under the skin daily (Patient taking differently: Inject 24 Units under the skin daily at bedtime ), Disp: 5 pen, Rfl: 5    magnesium hydroxide (MILK OF MAGNESIA) 400 mg/5 mL oral suspension, Take 30 mL by mouth daily as needed for constipation, Disp: , Rfl:     Melatonin 5 MG TABS, TAKE ONE TABLET BY MOUTH EVERY NIGHT AT BEDTIME, Disp: 30 tablet, Rfl: 2    metFORMIN (GLUCOPHAGE) 1000 MG tablet, TAKE ONE TABLET BY MOUTH TWO TIMES A DAY (Patient taking differently: Take 1,000 mg by mouth daily at bedtime ), Disp: 60 tablet, Rfl: 2    metoprolol tartrate (LOPRESSOR) 25 mg tablet, Take 0 5 tablets (12 5 mg total) by mouth every 12 (twelve) hours, Disp: 30 tablet, Rfl: 0    multivitamin-minerals therapeutic (THERA-M), , Disp: , Rfl:     NOVOLOG FLEXPEN 100 units/mL SOPN, INJ 5U BEFORE MEALS AND PER SS <250=NO CALL 250-300=5U,301-350= 10U,351-400=15U,401-450=20U>451 CALL FOR ORDERS UP TO 4X PER DAY, Disp: 15 mL, Rfl: 5    ondansetron (ZOFRAN) 4 mg tablet, ondansetron HCl 4 mg tablet  TAKE 1 TABLET BY MOUTH EVERY 8 HOURS AS NEEDED, Disp: , Rfl:     torsemide (DEMADEX) 10 mg tablet, TAKE 2 TABLETS BY MOUTH TWO TIMES A DAY, Disp: 120 tablet, Rfl: 2    warfarin (COUMADIN) 7 5 mg tablet, 7 5 mg on August 12th and 13th then INR on August 14th (Patient taking differently: Take 5 mg by mouth daily Takes 1 5 tabs at HS every Sunday, Tuesday, Thursday  Takes 2 tabs HS every Monday, Wednesday, Friday and Saturday), Disp: 30 tablet, Rfl: 0    Insulin Pen Needle 31G X 5 MM MISC, by Does not apply route 2 (two) times a day for 50 days, Disp: 100 each, Rfl: 0  Allergies   Allergen Reactions    Latex Rash       Vitals: Blood pressure 130/80, pulse 78, temperature 98 °F (36 7 °C), temperature source Tympanic, height 6' 3" (1 905 m), weight (!) 138 kg (305 lb), SpO2 99 %  Body mass index is 38 12 kg/m²  Oxygen Therapy  SpO2: 99 %  Oxygen Therapy: None (Room air)    Physical Exam   Physical Exam  Constitutional:       General: He is not in acute distress  Appearance: He is obese  HENT:      Head: Normocephalic  Mouth/Throat:      Pharynx: No oropharyngeal exudate  Eyes:      General: No scleral icterus  Pupils: Pupils are equal, round, and reactive to light  Neck:      Musculoskeletal: Neck supple  Vascular: No JVD  Cardiovascular:      Rate and Rhythm: Normal rate and regular rhythm  Pulmonary:      Breath sounds: No wheezing or rhonchi  Abdominal:      Palpations: Abdomen is soft  Tenderness:  There is no abdominal tenderness  Musculoskeletal:      Comments: S/p L AKA,   Chronic stasis changes of the right lower extremity   Lymphadenopathy:      Cervical: No cervical adenopathy  Skin:     General: Skin is warm and dry  Neurological:      Mental Status: He is alert and oriented to person, place, and time  Psychiatric:         Mood and Affect: Mood normal          Behavior: Behavior normal          Labs: I have personally reviewed pertinent lab results  Lab Results   Component Value Date    WBC 6 43 11/02/2020    HGB 12 6 11/02/2020    HCT 41 5 11/02/2020    MCV 87 11/02/2020     11/02/2020     Lab Results   Component Value Date    GLUCOSE 195 (H) 08/30/2020    CALCIUM 8 8 11/02/2020     01/15/2018    K 4 1 11/02/2020    CO2 32 11/02/2020     11/02/2020    BUN 22 11/02/2020    CREATININE 1 19 11/02/2020     No results found for: IGE  Lab Results   Component Value Date    ALT 16 11/02/2020    AST 20 11/02/2020     (H) 11/03/2019    ALKPHOS 172 (H) 11/02/2020    BILITOT 0 6 01/15/2018       Imaging and other studies: I have personally reviewed pertinent reports  and I have personally reviewed pertinent films in PACS  CT of the chest abdomen pelvis from 1/19/21 shows bibasilar atelectasis  There is a moderate to large hiatal hernia    Pulmonary function testing:  Performed  6/5/19 and personally interpreted  FEV1/FVC ratio 64%   FEV1 46% predicted  FVC 53% predicted  No response to bronchodilators  TLC 90 % predicted   % predicted  DLCO corrected for hemoglobin 58 % predicted     PFTs with severe obstruction, moderate air trapping and moderate diffusion impairment

## 2021-03-25 NOTE — ASSESSMENT & PLAN NOTE
Patient has moderate persistent asthma but has been well controlled with Advair twice daily  He has not needed his rescue inhaler on any regular basis  Will continue with this regimen  He can follow-up with me in 1 year sooner if needed

## 2021-03-30 ENCOUNTER — HOSPITAL ENCOUNTER (OUTPATIENT)
Dept: INFUSION CENTER | Facility: HOSPITAL | Age: 71
Discharge: HOME | End: 2021-03-30
Attending: INTERNAL MEDICINE
Payer: COMMERCIAL

## 2021-03-30 VITALS
TEMPERATURE: 97.7 F | RESPIRATION RATE: 16 BRPM | HEART RATE: 58 BPM | OXYGEN SATURATION: 99 % | BODY MASS INDEX: 37.92 KG/M2 | HEIGHT: 75 IN | WEIGHT: 305 LBS | DIASTOLIC BLOOD PRESSURE: 56 MMHG | SYSTOLIC BLOOD PRESSURE: 113 MMHG

## 2021-03-30 DIAGNOSIS — C90.00 MULTIPLE MYELOMA NOT HAVING ACHIEVED REMISSION (HCC): Primary | ICD-10-CM

## 2021-03-30 LAB — INR PPP: 1.3 (ref 0.84–1.19)

## 2021-03-30 PROCEDURE — 96401 CHEMO ANTI-NEOPL SQ/IM: CPT

## 2021-03-30 RX ORDER — DEXAMETHASONE 4 MG/1
20 TABLET ORAL ONCE
Status: COMPLETED | OUTPATIENT
Start: 2021-03-30 | End: 2021-03-30

## 2021-03-30 RX ADMIN — BORTEZOMIB 3.4 MG: 3.5 INJECTION, POWDER, LYOPHILIZED, FOR SOLUTION INTRAVENOUS; SUBCUTANEOUS at 11:09

## 2021-03-30 RX ADMIN — DEXAMETHASONE 20 MG: 4 TABLET ORAL at 10:25

## 2021-03-30 NOTE — PROGRESS NOTES
Velcade given with no adv reactions; bandaid dry and intact; AVS given; pt taken out in wc to transport

## 2021-03-30 NOTE — PLAN OF CARE
Problem: Potential for Falls  Goal: Patient will remain free of falls  Description: INTERVENTIONS:  - Assess patient frequently for physical needs  -  Identify cognitive and physical deficits and behaviors that affect risk of falls    -  [facility] fall precautions as indicated by assessment   - Educate patient/family on patient safety including physical limitations  - Instruct patient to call for assistance with activity based on assessment  - Modify environment to reduce risk of injury  - Consider OT/PT consult to assist with strengthening/mobility  Outcome: Progressing

## 2021-03-31 ENCOUNTER — ANTICOAG VISIT (OUTPATIENT)
Dept: CARDIOLOGY CLINIC | Facility: CLINIC | Age: 71
End: 2021-03-31

## 2021-03-31 DIAGNOSIS — I48.20 CHRONIC ATRIAL FIBRILLATION (HCC): ICD-10-CM

## 2021-03-31 NOTE — PROGRESS NOTES
Tc to [address] [facility]  Will take 10 mg today in place of 8 mg, then increase dose 8 mg sun/wed, 7 mg other days of the week, inr due 1 week  [facility]ed to [facility] , and pharmacy

## 2021-04-02 ENCOUNTER — TELEPHONE (OUTPATIENT)
Dept: HEMATOLOGY ONCOLOGY | Facility: HOSPITAL | Age: 71
End: 2021-04-02

## 2021-04-02 ENCOUNTER — TELEPHONE (OUTPATIENT)
Dept: HEMATOLOGY ONCOLOGY | Facility: CLINIC | Age: 71
End: 2021-04-02

## 2021-04-02 NOTE — TELEPHONE ENCOUNTER
Spoke with Karina who provided the fax number to the [facility] within  [facility], [phone]  Lab orders faxed along with appointment reminder

## 2021-04-02 NOTE — TELEPHONE ENCOUNTER
Appointment Confirmation     Appointment with  Margarette Kirkland   Appointment date & time 4/5 10am    Location [place]    Patient verbilized Understanding Yes

## 2021-04-02 NOTE — PROGRESS NOTES
Hematology/Oncology Outpatient Follow- up Note  Mari Kumar 1950, [id number]  4/5/2021        Chief Complaint   Patient presents with    Follow-up       HPI:  Steph Curry a 80 yo male with multiple comorbidities including diabetes and hypertension with bone marrow biopsy proven multiple myeloma  He was referred to us in 8/2019 after 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  He was noted to have elevated kidney function and anemia which prompted work up for multiple myeloma  His skeletal survey was negative  He underwent bone marrow biopsy confirming multiple myeloma  He was initiated on treatment with with Velcade and Decadron weekly on a 35 day schedule   Velcade is 1 3 milligrams/meter squared and Decadron is 20 mg p o  On days 1, 8, 15, 22      Previous Hematologic/ Oncologic History:    Oncology History   Multiple myeloma ([facility] )   9/16/2019 Initial Diagnosis    Multiple myeloma not having achieved remission ([facility] )     9/24/2019 -  Chemotherapy    iron sucrose (VENOFER) injection 200 mg, 200 mg (100 % of original dose 200 mg), Intravenous, Once, 1 of 1 cycle  Dose modification: 200 mg (original dose 200 mg, Cycle 1, Reason: Other (See Comments))  Administration: 200 mg (9/24/2019)  bortezomib (VELCADE) subcutaneous injection 3 5 mg, 1 3 mg/m2 = 3 5 mg (81 3 % of original dose 1 6 mg/m2), Subcutaneous, Once, 13 of 18 cycles  Dose modification: 1 3 mg/m2 (original dose 1 6 mg/m2, Cycle 1, Reason: Dose Not Tolerated)  Administration: 3 5 mg (9/24/2019), 3 5 mg (10/1/2019), 3 5 mg (10/8/2019), 3 5 mg (10/15/2019), 3 5 mg (1/6/2020), 3 5 mg (1/13/2020), 3 5 mg (1/20/2020), 3 5 mg (1/27/2020), 3 5 mg (2/11/2020), 3 5 mg (2/17/2020), 3 5 mg (2/24/2020), 3 5 mg (3/2/2020), 3 5 mg (3/16/2020), 3 5 mg (3/23/2020), 3 5 mg (3/30/2020), 3 5 mg (4/6/2020), 3 5 mg (4/20/2020), 3 5 mg (4/27/2020), 3 5 mg (5/4/2020), 3 5 mg (5/11/2020), 3 5 mg (5/26/2020), 3 5 mg (6/1/2020), 3 5 mg (2020), 3 5 mg (6/15/2020), 3 5 mg (2020), 3 4 mg (2020), 3 4 mg (2020), 3 5 mg (2020), 3 4 mg (2020), 3 4 mg (2020), 3 4 mg (2020), 3 4 mg (10/5/2020), 3 4 mg (2020), 3 4 mg (2020), 3 4 mg (2020), 3 4 mg (2020), 3 4 mg (2020), 3 4 mg (2020), 3 4 mg (2020), 3 4 mg (2021), 3 4 mg (2021), 3 4 mg (2021), 3 4 mg (2/3/2021), 3 4 mg (2021), 3 4 mg (2/15/2021), 3 4 mg (2021), 3 4 mg (3/9/2021), 3 4 mg (3/16/2021), 3 4 mg (3/23/2021), 3 4 mg (3/30/2021)         Current Hematologic/ Oncologic Treatment:    Velcade and Decadron    ECO - Symptomatic, <50% confined to bed    Interval History:   The patient presents for routine follow up  He was last seen on 21  Most recent blood work completed on 3/25 was reviewed  Calcium 9, creatinine 1 17  CBC acceptable range  Hemoglobin 10 9  Myeloma labs from 21 are stable  No M spike  Beta-2 3 7, free light chain ratio normal, IgM mildly low 37  IgG and IgA normal  He did not have repeat myeloma panel drawn as ordered for this visit  He is due for next treatment on   Overall, patient states he is feeling fairly well, at his baseline  Appetite remains good  No N/V  Occasional constipation  Denies any bony pain  He is receiving therapy few days a week  Cancer Staging:  Cancer Staging  No matching staging information was found for the patient  Molecular Testing:         Test Results:    Imaging: No results found      Labs:   Lab Results   Component Value Date    WBC 6 43 2020    HGB 12 6 2020    HCT 41 5 2020    MCV 87 2020     2020     Lab Results   Component Value Date     01/15/2018    K 4 1 2020     2020    CO2 32 2020    ANIONGAP 11 2015    BUN 22 2020    CREATININE 1 19 2020    GLUCOSE 195 (H) 2020    GLUF 141 (H) 2019    CALCIUM 8 8 2020 CORRECTEDCA 9 4 11/02/2020    AST 20 11/02/2020    ALT 16 11/02/2020    ALKPHOS 172 (H) 11/02/2020    PROT 7 1 01/15/2018    BILITOT 0 6 01/15/2018    EGFR 62 11/02/2020           Review of Systems   Constitutional: Positive for activity change  Gastrointestinal: Positive for abdominal pain and constipation  Musculoskeletal: Positive for arthralgias and gait problem  All other systems reviewed and are negative          Active Problems:   Patient Active Problem List   Diagnosis    Diabetic foot ulcer ([place] [address] )    Diabetes mellitus type 2, uncontrolled ([place] [address] )    Chronic venous hypertension, right    Essential hypertension    Chronic atrial fibrillation (HCC)    Morbid obesity (HCC)    Chronic diastolic congestive heart failure (HCC)    Cardiomyopathy (HCC)    Diabetes, polyneuropathy ([place] [address] )    GERD (gastroesophageal reflux disease)    Hyperlipidemia    Onychomycosis    Gallstones    Localized edema    Peripheral vascular disease (HCC)    SOB (shortness of breath)    NALLELY (obstructive sleep apnea)    Moderate persistent asthma without complication    Multiple myeloma (HCC)    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)    Hiatal hernia    Weakness    Type 2 acute myocardial infarction (HCC)    Chronic obstructive pulmonary disease, unspecified ([place] [address] )    Hypertensive chronic kidney disease w stg 1-4/unsp chr kdny    Mass of psoas muscle    CKD (chronic kidney disease)    Chronic diastolic (congestive) heart failure (HCC)    Lymphedema    Rash    Difficulty in walking, not elsewhere classified    Gout    Venous insufficiency (chronic) (peripheral)    Sepsis due to COVID-19 pneumonia ([place] [address] )    Fall from bed    Need for influenza vaccination    Venous hypertension, chronic, with ulcer and inflammation, right ([place] [address] )       Past Medical History:   Past Medical History:   Diagnosis Date    Cancer ([place] [address] )     CHF (congestive heart failure) (HCC)     Chronic kidney disease     COPD [facility] disease[facility] )     COVID-19     Decubitus ulcer of heel     LAST ASSESSED [id number]    Diabetes [facility] )     History of varicose veins     Hypertension     Intermittent claudication (HCC)     Neuropathy     Seasonal allergies        Surgical History:   Past Surgical History:   Procedure Laterality Date    AMPUTATION ABOVE KNEE (AKA) Left 7/31/2019    Procedure: AMPUTATION ABOVE KNEE (AKA); Surgeon: Richy Zepeda MD;  Location:  MAIN OR;  Service: General    ANGIOPLASTY      W/ FLUOROSC ANGIOGRAPGY PERIPHERAL ARTERY ADDITIONAL  LAST ASSESSED [id number]    ANGIOPLASTY / STENTING FEMORAL      TANSCATH INTRAVASCULAR STENT PLACEMENT PERCUTANEOUS FEMORAL     COLONOSCOPY  2010    CT BONE MARROW BIOPSY AND ASPIRATION  8/9/2019    FULL THICKNESS SKIN GRAFT      TENDON REPAIR      TOE AMPUTATION Left 12/27/2018    Procedure: AMPUTATION left 4th TOE;  Surgeon: Garland Bence, DPM;  Location: [facility];  Service: Podiatry       Family History:    Family History   Problem Relation Age of Onset    Other Mother         CARDIAC DISORDER     Diabetes Mother     Cancer Father     Other Family         CARDIAC DISORDER     Diabetes Family     Cancer Family     Mental illness Family     Kidney disease Sister     Diabetes Sister        Cancer-related family history includes Cancer in his family and father      Social History:   Social History     Socioeconomic History    Marital status:      Spouse name: Not on file    Number of children: 1    Years of education: Not on file    Highest education level: Not on file   Occupational History    Occupation: RETIRED   Social Needs    Financial resource strain: Not on file    Food insecurity     Worry: Not on file     Inability: Not on file   [employer] needs     Medical: Not on file     Non-medical: Not on file   Tobacco Use    Smoking status: Never Smoker    Smokeless tobacco: Never Used   Substance and Sexual Activity    Alcohol use: Not Currently    Drug use: Not Currently    Sexual activity: Not Currently   Lifestyle    Physical activity     Days per week: Not on file     Minutes per session: Not on file    Stress: Not on file   Relationships    Social connections     Talks on phone: Not on file     Gets together: Not on file     Attends Faith service: Not on file     Active member of club or organization: Not on file     Attends meetings of clubs or organizations: Not on file     Relationship status: Not on file    Intimate partner violence     Fear of current or ex partner: Not on file     Emotionally abused: Not on file     Physically abused: Not on file     Forced sexual activity: Not on file   Other Topics Concern    Not on file   Social History Narrative    DENIED [address]    FEELS SAFE AT HOME    LIVES WITH FAMILY - SISTER    NO LIVING WILL    POA IN EXISTENCE     SUPPORTIVE AND SAFE    One son, 2 granddaughters       Current Medications:   Current Outpatient Medications   Medication Sig Dispense Refill    acetaminophen (TYLENOL) 500 mg tablet Take 1 tablet (500 mg total) by mouth every 6 (six) hours as needed for mild pain, headaches or fever 90 tablet 1    albuterol (PROVENTIL HFA,VENTOLIN HFA) 90 mcg/act inhaler Inhale 2 puffs every 6 (six) hours as needed for wheezing 1 Inhaler 0    Alcohol Swabs (ALCOHOL PREP) 70 % PADS   0    allopurinol (ZYLOPRIM) 300 mg tablet TAKE ONE TABLET BY MOUTH DAILY (Patient taking differently: 100 mg ) 30 tablet 5    ammonium lactate (LAC-HYDRIN) 12 % lotion APPLY TO BLE TOPICALLY DAILY 400 g 2    atorvastatin (LIPITOR) 40 mg tablet Take 1 tablet (40 mg total) by mouth daily after dinner 30 tablet 0    BD AUTOSHIELD DUO 30G X 5 MM MISC USE AS DIRECTED WITH INSULIN FOUR TIMES A  each 3    BD INSULIN SYRINGE U/F 31G X 5/16" 0 5 ML MISC USE AS DIRECTED WITH NOVOLIN 70/30  0    BD PEN NEEDLE HILARIO U/F 32G X 4 MM MISC by Other route 3 (three) times a day 300 each 1    bisacodyl (bisacodyl) 5 mg EC tablet Take 5 mg by mouth daily as needed for constipation      bisacodyl (DULCOLAX) 10 mg suppository Insert 10 mg into the rectum as needed for constipation      bortezomib (VELCADE) Infuse into a venous catheter once      clotrimazole (LOTRIMIN) 1 % cream APPLY TO BUTTOCK 2 TIMES DAILY 45 g 3    Colcrys 0 6 MG tablet       Easy Touch Safety Lancets 28G MISC USE 4 TIMES DAILY TO TEST BLOOD SUGAR 100 each 5    fluticasone-salmeterol (ADVAIR DISKUS, WIXELA INHUB) 250-50 mcg/dose inhaler Inhale 1 puff 2 (two) times a day Rinse mouth after use   1 Inhaler 5    Glutose 15 40 %       insulin glargine (LANTUS SOLOSTAR) 100 units/mL injection pen Inject 22 Units under the skin daily (Patient taking differently: Inject 24 Units under the skin daily at bedtime ) 5 pen 5    magnesium hydroxide (MILK OF MAGNESIA) 400 mg/5 mL oral suspension Take 30 mL by mouth daily as needed for constipation      Melatonin 5 MG TABS TAKE ONE TABLET BY MOUTH EVERY NIGHT AT BEDTIME 30 tablet 2    metFORMIN (GLUCOPHAGE) 1000 MG tablet TAKE ONE TABLET BY MOUTH TWO TIMES A DAY (Patient taking differently: Take 1,000 mg by mouth daily at bedtime ) 60 tablet 2    metoprolol tartrate (LOPRESSOR) 25 mg tablet Take 0 5 tablets (12 5 mg total) by mouth every 12 (twelve) hours 30 tablet 0    multivitamin-minerals therapeutic (THERA-M)       NOVOLOG FLEXPEN 100 units/mL SOPN INJ 5U BEFORE MEALS AND PER SS <250=NO CALL 250-300=5U,301-350= 10U,351-400=15U,401-450=20U>451 CALL FOR ORDERS UP TO 4X PER DAY 15 mL 5    ondansetron (ZOFRAN) 4 mg tablet ondansetron HCl 4 mg tablet   TAKE 1 TABLET BY MOUTH EVERY 8 HOURS AS NEEDED      torsemide (DEMADEX) 10 mg tablet TAKE 2 TABLETS BY MOUTH TWO TIMES A  tablet 2    warfarin (COUMADIN) 1 mg tablet Take 1 mg by mouth daily at bedtime 2 mg on Wed and Sun      warfarin (COUMADIN) 6 mg tablet       Insulin Pen Needle 31G X 5 MM MISC by Does not apply route 2 (two) times a day for 50 days 100 each 0     No current facility-administered medications for this visit  Allergies: Allergies   Allergen Reactions    Latex - Food Allergy Rash       Physical Exam:  /76 (BP Location: Left arm, Patient Position: Sitting, Cuff Size: Adult)   Pulse 72   Temp 98 5 °F (36 9 °C) (Temporal)   Resp 14   Ht 6' 3" (1 905 m) Comment: Per [employer] (!) 150 kg (330 lb) Comment: Weighed @ [facility]  SpO2 98%   BMI 41 25 kg/m²   Body surface area is 2 72 meters squared  Wt Readings from Last 3 Encounters:   04/05/21 (!) 150 kg (330 lb)   03/30/21 (!) 138 kg (305 lb)   03/25/21 (!) 138 kg (305 lb)           Physical Exam  Constitutional:       General: He is not in acute distress  Appearance: He is well-developed  He is obese  He is not diaphoretic  HENT:      Head: Normocephalic and atraumatic  Mouth/Throat:      Pharynx: No oropharyngeal exudate  Eyes:      General: No scleral icterus  Pupils: Pupils are equal, round, and reactive to light  Neck:      Musculoskeletal: Normal range of motion and neck supple  Cardiovascular:      Rate and Rhythm: Normal rate and regular rhythm  Heart sounds: No murmur  Pulmonary:      Effort: Pulmonary effort is normal  No respiratory distress  Breath sounds: Normal breath sounds  Abdominal:      General: Bowel sounds are normal  There is no distension  Palpations: Abdomen is soft  There is no mass  Tenderness: There is no abdominal tenderness  Musculoskeletal:      Right lower leg: No edema  Comments: L BKA   Lymphadenopathy:      Cervical: No cervical adenopathy  Skin:     General: Skin is warm and dry  Neurological:      General: No focal deficit present  Mental Status: He is alert and oriented to person, place, and time  Psychiatric:         Mood and Affect: Mood normal          Behavior: Behavior normal          Thought Content: Thought content normal          Judgment: Judgment normal          Assessment / Plan:    1  Multiple myeloma not having achieved remission [facility])    The patient is a very pleasant 70 yo male with multiple comorbidities including diabetes and hypertension with bone marrow biopsy proven multiple myeloma  He was referred to us in 8/2019 after 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  He was noted to have elevated kidney function and anemia which prompted work up for multiple myeloma  His skeletal survey was negative  He underwent bone marrow biopsy confirming multiple myeloma  He was initiated on treatment with with Velcade and Decadron weekly on a 35 day schedule  Velcade is 1 3 milligrams/meter squared and Decadron is 20 mg p o  On days 1, 8, 15, 22  Skeletal survey from 11/2020 showed no evidence of lytic bony lesions  Blood work has been fairly stable  I have requested that his myeloma labs get drawn, last done in 2/2021 were stable       He continues to tolerate his treatment well  He will continue with current regimen without change  He will return for follow up in 6 weeks with repeat blood work  He will have labs done prior to treatments as planned  Patient was in agreement with plan of care  He was instructed to call with any questions or concerns prior to his next office visit         Goals and Barriers:  Current Goal:  Prolong Survival from multiple myeloma   Barriers: None  Patient's Capacity to Self Care:  Patient able to self care  Portions of the record may have been created with voice recognition software  Occasional wrong word or "sound a like" substitutions may have occurred due to the inherent limitations of voice recognition software  Read the chart carefully and recognize, using context, where substitutions have occurred

## 2021-04-05 ENCOUNTER — OFFICE VISIT (OUTPATIENT)
Dept: HEMATOLOGY ONCOLOGY | Facility: HOSPITAL | Age: 71
End: 2021-04-05
Payer: COMMERCIAL

## 2021-04-05 VITALS
OXYGEN SATURATION: 98 % | WEIGHT: 315 LBS | BODY MASS INDEX: 39.17 KG/M2 | HEART RATE: 72 BPM | SYSTOLIC BLOOD PRESSURE: 128 MMHG | TEMPERATURE: 98.5 F | DIASTOLIC BLOOD PRESSURE: 76 MMHG | RESPIRATION RATE: 14 BRPM | HEIGHT: 75 IN

## 2021-04-05 DIAGNOSIS — C90.00 MULTIPLE MYELOMA NOT HAVING ACHIEVED REMISSION (HCC): Primary | ICD-10-CM

## 2021-04-05 PROCEDURE — 3078F DIAST BP <80 MM HG: CPT | Performed by: NURSE PRACTITIONER

## 2021-04-05 PROCEDURE — 3008F BODY MASS INDEX DOCD: CPT | Performed by: NURSE PRACTITIONER

## 2021-04-05 PROCEDURE — 1160F RVW MEDS BY RX/DR IN RCRD: CPT | Performed by: NURSE PRACTITIONER

## 2021-04-05 PROCEDURE — 99214 OFFICE O/P EST MOD 30 MIN: CPT | Performed by: NURSE PRACTITIONER

## 2021-04-05 PROCEDURE — 1036F TOBACCO NON-USER: CPT | Performed by: NURSE PRACTITIONER

## 2021-04-05 PROCEDURE — 3074F SYST BP LT 130 MM HG: CPT | Performed by: NURSE PRACTITIONER

## 2021-04-05 RX ORDER — DEXAMETHASONE 4 MG/1
20 TABLET ORAL ONCE
Status: CANCELLED
Start: 2021-04-13

## 2021-04-05 RX ORDER — WARFARIN SODIUM 1 MG/1
1 TABLET ORAL
COMMUNITY
Start: 2021-03-31 | End: 2022-08-10

## 2021-04-05 RX ORDER — WARFARIN SODIUM 6 MG/1
TABLET ORAL
COMMUNITY
Start: 2021-03-31 | End: 2022-08-10

## 2021-04-05 RX ORDER — DEXAMETHASONE 4 MG/1
20 TABLET ORAL ONCE
Status: CANCELLED
Start: 2021-05-04

## 2021-04-05 RX ORDER — DEXAMETHASONE 4 MG/1
20 TABLET ORAL ONCE
Status: CANCELLED
Start: 2021-04-20

## 2021-04-05 RX ORDER — DEXAMETHASONE 4 MG/1
20 TABLET ORAL ONCE
Status: CANCELLED
Start: 2021-04-27

## 2021-04-06 ENCOUNTER — ANTICOAG VISIT (OUTPATIENT)
Dept: CARDIOLOGY CLINIC | Facility: CLINIC | Age: 71
End: 2021-04-06

## 2021-04-06 DIAGNOSIS — I48.20 CHRONIC ATRIAL FIBRILLATION (HCC): ICD-10-CM

## 2021-04-06 LAB — INR PPP: 1.5 (ref 0.84–1.19)

## 2021-04-06 NOTE — PROGRESS NOTES
Tc to [facility], spoke with Kimi Chung, noted patient has been receiving chemotherapy and decadron  will take 10 mg x 2 days, 8 mg thurs  inr due fri  Faxed to [facility] and pharmacy

## 2021-04-09 ENCOUNTER — ANTICOAG VISIT (OUTPATIENT)
Dept: CARDIOLOGY CLINIC | Facility: CLINIC | Age: 71
End: 2021-04-09

## 2021-04-09 DIAGNOSIS — I48.20 CHRONIC ATRIAL FIBRILLATION (HCC): ICD-10-CM

## 2021-04-09 LAB — INR PPP: 1.8 (ref 0.84–1.19)

## 2021-04-09 NOTE — PROGRESS NOTES
Tc to [address] [place], [place] [address]rhoda with soto, she reports patient is not taking decadron at this tiime  Will increase dose, 8 mg sun/wed/fri, 7 mg other days of the week, inr due 5 days

## 2021-04-13 ENCOUNTER — HOSPITAL ENCOUNTER (OUTPATIENT)
Dept: INFUSION CENTER | Facility: HOSPITAL | Age: 71
Discharge: HOME | End: 2021-04-13
Attending: INTERNAL MEDICINE
Payer: COMMERCIAL

## 2021-04-13 ENCOUNTER — ANTICOAG VISIT (OUTPATIENT)
Dept: CARDIOLOGY CLINIC | Facility: CLINIC | Age: 71
End: 2021-04-13

## 2021-04-13 VITALS
BODY MASS INDEX: 38.05 KG/M2 | HEART RATE: 80 BPM | WEIGHT: 306 LBS | SYSTOLIC BLOOD PRESSURE: 121 MMHG | DIASTOLIC BLOOD PRESSURE: 71 MMHG | HEIGHT: 75 IN | OXYGEN SATURATION: 96 % | RESPIRATION RATE: 14 BRPM | TEMPERATURE: 97.7 F

## 2021-04-13 DIAGNOSIS — I48.20 CHRONIC ATRIAL FIBRILLATION (HCC): ICD-10-CM

## 2021-04-13 DIAGNOSIS — C90.00 MULTIPLE MYELOMA NOT HAVING ACHIEVED REMISSION (HCC): Primary | ICD-10-CM

## 2021-04-13 LAB — INR PPP: 2.1 (ref 0.84–1.19)

## 2021-04-13 PROCEDURE — 96401 CHEMO ANTI-NEOPL SQ/IM: CPT

## 2021-04-13 RX ORDER — DEXAMETHASONE 4 MG/1
20 TABLET ORAL ONCE
Status: COMPLETED | OUTPATIENT
Start: 2021-04-13 | End: 2021-04-13

## 2021-04-13 RX ADMIN — DEXAMETHASONE 20 MG: 4 TABLET ORAL at 11:05

## 2021-04-13 RX ADMIN — BORTEZOMIB 3.4 MG: 3.5 INJECTION, POWDER, LYOPHILIZED, FOR SOLUTION INTRAVENOUS; SUBCUTANEOUS at 11:25

## 2021-04-13 NOTE — PROGRESS NOTES
Pt tolerated chemo injection in right abdomen with no adverse reaction  Bandaid intact  Pt left unit in wc to meet transport team in [facility]  AVS printed and given to pt and copy to [facility]

## 2021-04-13 NOTE — PROGRESS NOTES
Tc to chapo at [address] [place] [address], will fax to continue current dose, inr due 1 week  Faxed to center and pharmacy

## 2021-04-20 ENCOUNTER — HOSPITAL ENCOUNTER (OUTPATIENT)
Dept: INFUSION CENTER | Facility: HOSPITAL | Age: 71
Discharge: HOME | End: 2021-04-20
Attending: INTERNAL MEDICINE
Payer: COMMERCIAL

## 2021-04-20 ENCOUNTER — ANTICOAG VISIT (OUTPATIENT)
Dept: CARDIOLOGY CLINIC | Facility: CLINIC | Age: 71
End: 2021-04-20

## 2021-04-20 VITALS — BODY MASS INDEX: 38.17 KG/M2 | HEIGHT: 75 IN | WEIGHT: 307 LBS

## 2021-04-20 DIAGNOSIS — C90.00 MULTIPLE MYELOMA NOT HAVING ACHIEVED REMISSION (HCC): Primary | ICD-10-CM

## 2021-04-20 DIAGNOSIS — I48.20 CHRONIC ATRIAL FIBRILLATION (HCC): ICD-10-CM

## 2021-04-20 LAB — INR PPP: 1.7 (ref 0.84–1.19)

## 2021-04-20 PROCEDURE — 96401 CHEMO ANTI-NEOPL SQ/IM: CPT

## 2021-04-20 RX ORDER — DEXAMETHASONE 4 MG/1
20 TABLET ORAL ONCE
Status: COMPLETED | OUTPATIENT
Start: 2021-04-20 | End: 2021-04-20

## 2021-04-20 RX ADMIN — DEXAMETHASONE 20 MG: 4 TABLET ORAL at 11:15

## 2021-04-20 RX ADMIN — BORTEZOMIB 3.4 MG: 3.5 INJECTION, POWDER, LYOPHILIZED, FOR SOLUTION INTRAVENOUS; SUBCUTANEOUS at 11:29

## 2021-04-20 NOTE — PROGRESS NOTES
Pt tolerated chemo injection in right abdomen with no adverse reaction  Pt escorted in wc to transport team  AVS printed and given to pt

## 2021-04-20 NOTE — PLAN OF CARE
Problem: Potential for Falls  Goal: Patient will remain free of falls  Description: INTERVENTIONS:  - Assess patient frequently for physical needs  -  Identify cognitive and physical deficits and behaviors that affect risk of falls  -  [place] fall precautions as indicated by assessment   - Educate patient/family on patient safety including physical limitations  - Instruct patient to call for assistance with activity based on assessment  - Modify environment to reduce risk of injury  - Consider OT/PT consult to assist with strengthening/mobility  Outcome: Progressing     Problem: Knowledge Deficit  Goal: Patient/family/caregiver demonstrates understanding of disease process, treatment plan, medications, and discharge instructions  Description: Complete learning assessment and assess knowledge base    Interventions:  - Provide teaching at level of understanding  - Provide teaching via preferred learning methods  Outcome: Progressing

## 2021-04-21 ENCOUNTER — TELEPHONE (OUTPATIENT)
Dept: CARDIOLOGY CLINIC | Facility: CLINIC | Age: 71
End: 2021-04-21

## 2021-04-21 NOTE — TELEPHONE ENCOUNTER
Aj regalado at [facility], did not receive fax from 4/20/21 regarding warfarin, re faxed instructions from 4/20/21, by Wolfgang garcia'Nikhil to [facility]

## 2021-04-27 ENCOUNTER — HOSPITAL ENCOUNTER (OUTPATIENT)
Dept: INFUSION CENTER | Facility: HOSPITAL | Age: 71
Discharge: HOME | End: 2021-04-27
Attending: INTERNAL MEDICINE
Payer: COMMERCIAL

## 2021-04-27 ENCOUNTER — TELEPHONE (OUTPATIENT)
Dept: HEMATOLOGY ONCOLOGY | Facility: CLINIC | Age: 71
End: 2021-04-27

## 2021-04-27 VITALS
DIASTOLIC BLOOD PRESSURE: 63 MMHG | BODY MASS INDEX: 38.3 KG/M2 | HEIGHT: 75 IN | RESPIRATION RATE: 16 BRPM | SYSTOLIC BLOOD PRESSURE: 134 MMHG | TEMPERATURE: 97.1 F | OXYGEN SATURATION: 98 % | HEART RATE: 64 BPM | WEIGHT: 308 LBS

## 2021-04-27 DIAGNOSIS — C90.00 MULTIPLE MYELOMA NOT HAVING ACHIEVED REMISSION (HCC): Primary | ICD-10-CM

## 2021-04-27 PROCEDURE — 96401 CHEMO ANTI-NEOPL SQ/IM: CPT

## 2021-04-27 RX ORDER — DEXAMETHASONE 4 MG/1
20 TABLET ORAL ONCE
Status: COMPLETED | OUTPATIENT
Start: 2021-04-27 | End: 2021-04-27

## 2021-04-27 RX ADMIN — DEXAMETHASONE 20 MG: 4 TABLET ORAL at 11:03

## 2021-04-27 RX ADMIN — BORTEZOMIB 3.4 MG: 3.5 INJECTION, POWDER, LYOPHILIZED, FOR SOLUTION INTRAVENOUS; SUBCUTANEOUS at 11:29

## 2021-04-27 NOTE — PROGRESS NOTES
Patient received Velcade injection SQ right abdomen  Labs and AVS provided to patient  Copy of AVS provided in envelope for NH  Patient left unit via [facility] for transport home

## 2021-04-27 NOTE — TELEPHONE ENCOUNTER
Call from patient who is concerned that his BUN is elevated at 51  Creatinine is normal at 1 19  Patient is asking if antibiotic should be ordered    Patient denies any signs of infection or fever  Does have urinary urgency but is chronic per CRNP note in 2/22/2021:  He is still having urgency    Explained to patient that antibiotics are not prescribed for elevated BUN and his creatinine is within normal range    Patient verbalizes understanding      FYI to Dr Ramya Shipley

## 2021-04-27 NOTE — PLAN OF CARE
Problem: Potential for Falls  Goal: Patient will remain free of falls  Description: INTERVENTIONS:  - Assess patient frequently for physical needs  -  Identify cognitive and physical deficits and behaviors that affect risk of falls    -  [place] fall precautions as indicated by assessment   - Educate patient/family on patient safety including physical limitations  - Instruct patient to call for assistance with activity based on assessment  - Modify environment to reduce risk of injury  - Consider OT/PT consult to assist with strengthening/mobility  Outcome: Progressing

## 2021-04-29 LAB — INR PPP: 1.5 (ref 0.84–1.19)

## 2021-04-30 ENCOUNTER — ANTICOAG VISIT (OUTPATIENT)
Dept: CARDIOLOGY CLINIC | Facility: CLINIC | Age: 71
End: 2021-04-30

## 2021-04-30 DIAGNOSIS — I48.20 CHRONIC ATRIAL FIBRILLATION (HCC): ICD-10-CM

## 2021-04-30 NOTE — PROGRESS NOTES
Tc to [address] [place] [address], spoke with Leoncio Rodrigez, denies any missed doses or medication  changes  Reports patient eats a lot of junk foods  Will increase dose, 10 mg x 1 today, the increase dos,e 7 mg mon/fri, 8 mg other days of the week, inr due 1 week  Will fax to wellness center and pharmacy

## 2021-05-04 ENCOUNTER — HOSPITAL ENCOUNTER (OUTPATIENT)
Dept: INFUSION CENTER | Facility: HOSPITAL | Age: 71
Discharge: HOME | End: 2021-05-04
Attending: INTERNAL MEDICINE
Payer: COMMERCIAL

## 2021-05-04 VITALS
OXYGEN SATURATION: 98 % | BODY MASS INDEX: 39.17 KG/M2 | SYSTOLIC BLOOD PRESSURE: 134 MMHG | TEMPERATURE: 97.7 F | HEIGHT: 75 IN | RESPIRATION RATE: 18 BRPM | DIASTOLIC BLOOD PRESSURE: 61 MMHG | WEIGHT: 315 LBS | HEART RATE: 67 BPM

## 2021-05-04 DIAGNOSIS — C90.00 MULTIPLE MYELOMA NOT HAVING ACHIEVED REMISSION (HCC): Primary | ICD-10-CM

## 2021-05-04 PROCEDURE — 96401 CHEMO ANTI-NEOPL SQ/IM: CPT

## 2021-05-04 RX ORDER — DEXAMETHASONE 4 MG/1
20 TABLET ORAL ONCE
Status: COMPLETED | OUTPATIENT
Start: 2021-05-04 | End: 2021-05-04

## 2021-05-04 RX ADMIN — BORTEZOMIB 3.4 MG: 3.5 INJECTION, POWDER, LYOPHILIZED, FOR SOLUTION INTRAVENOUS; SUBCUTANEOUS at 11:41

## 2021-05-04 RX ADMIN — DEXAMETHASONE 20 MG: 4 TABLET ORAL at 11:19

## 2021-05-04 NOTE — PROGRESS NOTES
Pt arrived via w/c for Velcade injection  Familiar with procedure  Oral premeds given  Velcade given SQ Lt side of abdomen  Dsd applied  Disch via w/c to transport to [facility]

## 2021-05-06 ENCOUNTER — ANTICOAG VISIT (OUTPATIENT)
Dept: CARDIOLOGY CLINIC | Facility: CLINIC | Age: 71
End: 2021-05-06

## 2021-05-06 DIAGNOSIS — I48.20 CHRONIC ATRIAL FIBRILLATION (HCC): ICD-10-CM

## 2021-05-06 LAB — INR PPP: 1.6 (ref 0.84–1.19)

## 2021-05-11 RX ORDER — DEXAMETHASONE 4 MG/1
20 TABLET ORAL ONCE
Status: CANCELLED
Start: 2021-06-01

## 2021-05-11 RX ORDER — DEXAMETHASONE 4 MG/1
20 TABLET ORAL ONCE
Status: CANCELLED
Start: 2021-06-08

## 2021-05-11 RX ORDER — DEXAMETHASONE 4 MG/1
20 TABLET ORAL ONCE
Status: CANCELLED
Start: 2021-05-25

## 2021-05-11 RX ORDER — DEXAMETHASONE 4 MG/1
20 TABLET ORAL ONCE
Status: CANCELLED
Start: 2021-05-18

## 2021-05-13 ENCOUNTER — ANTICOAG VISIT (OUTPATIENT)
Dept: CARDIOLOGY CLINIC | Facility: CLINIC | Age: 71
End: 2021-05-13

## 2021-05-13 DIAGNOSIS — I48.20 CHRONIC ATRIAL FIBRILLATION (HCC): ICD-10-CM

## 2021-05-13 LAB — INR PPP: 1.9 (ref 0.84–1.19)

## 2021-05-13 NOTE — PROGRESS NOTES
Tc to [address] [email or web address], will fax to wellness center and pharmacy, will increase dose, 10 mg fri, 8 mg other days of the week, inr due 1 week

## 2021-05-18 ENCOUNTER — HOSPITAL ENCOUNTER (OUTPATIENT)
Dept: INFUSION CENTER | Facility: HOSPITAL | Age: 71
Discharge: HOME | End: 2021-05-18
Attending: INTERNAL MEDICINE
Payer: COMMERCIAL

## 2021-05-18 VITALS
HEART RATE: 65 BPM | TEMPERATURE: 98.4 F | SYSTOLIC BLOOD PRESSURE: 140 MMHG | HEIGHT: 75 IN | RESPIRATION RATE: 16 BRPM | WEIGHT: 312 LBS | DIASTOLIC BLOOD PRESSURE: 71 MMHG | OXYGEN SATURATION: 99 % | BODY MASS INDEX: 38.79 KG/M2

## 2021-05-18 DIAGNOSIS — C90.00 MULTIPLE MYELOMA NOT HAVING ACHIEVED REMISSION (HCC): Primary | ICD-10-CM

## 2021-05-18 PROCEDURE — 96401 CHEMO ANTI-NEOPL SQ/IM: CPT

## 2021-05-18 RX ORDER — DEXAMETHASONE 4 MG/1
20 TABLET ORAL ONCE
Status: COMPLETED | OUTPATIENT
Start: 2021-05-18 | End: 2021-05-18

## 2021-05-18 RX ADMIN — DEXAMETHASONE 20 MG: 4 TABLET ORAL at 11:34

## 2021-05-18 RX ADMIN — BORTEZOMIB 3.5 MG: 3.5 INJECTION, POWDER, LYOPHILIZED, FOR SOLUTION INTRAVENOUS; SUBCUTANEOUS at 12:24

## 2021-05-18 NOTE — PROGRESS NOTES
Pt tolerated chemo injection in right abdomen with no adverse reaction  Band aid intact  Escorted pt in wc to lobby to meet   AVS printed and given to pt

## 2021-05-20 ENCOUNTER — ANTICOAG VISIT (OUTPATIENT)
Dept: CARDIOLOGY CLINIC | Facility: CLINIC | Age: 71
End: 2021-05-20

## 2021-05-20 DIAGNOSIS — I48.20 CHRONIC ATRIAL FIBRILLATION (HCC): ICD-10-CM

## 2021-05-20 LAB — INR PPP: 2.1 (ref 0.84–1.19)

## 2021-05-20 NOTE — PROGRESS NOTES
Tc to wellness center, spoke with Letty Yeh, will fax to wellness  Center and pharmacy, continue current dose, inr due 2 weeks

## 2021-05-20 NOTE — PROGRESS NOTES
Hematology/Oncology Outpatient Follow- up Note  Imelda Eucedae 1950, [id number]  5/21/2021        Chief Complaint   Patient presents with    Follow-up       HPI:  Enmanuel Guerra a 82 yo male with multiple comorbidities including diabetes and hypertension with bone marrow biopsy proven multiple myeloma  He was referred to us in 8/2019 after 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  He was noted to have elevated kidney function and anemia which prompted work up for multiple myeloma  His skeletal survey was negative  He underwent bone marrow biopsy confirming multiple myeloma  He was initiated on treatment with with Velcade and Decadron weekly on a 35 day schedule   Velcade is 1 3 milligrams/meter squared and Decadron is 20 mg p o  On days 1, 8, 15, 22      Previous Hematologic/ Oncologic History:    Oncology History   Multiple myeloma ([facility] )   9/16/2019 Initial Diagnosis    Multiple myeloma not having achieved remission ([facility] )     9/24/2019 -  Chemotherapy    iron sucrose (VENOFER) injection 200 mg, 200 mg (100 % of original dose 200 mg), Intravenous, Once, 1 of 1 cycle  Dose modification: 200 mg (original dose 200 mg, Cycle 1, Reason: Other (See Comments))  Administration: 200 mg (9/24/2019)  bortezomib (VELCADE) subcutaneous injection 3 5 mg, 1 3 mg/m2 = 3 5 mg (81 3 % of original dose 1 6 mg/m2), Subcutaneous, Once, 15 of 23 cycles  Dose modification: 1 3 mg/m2 (original dose 1 6 mg/m2, Cycle 1, Reason: Dose Not Tolerated)  Administration: 3 5 mg (9/24/2019), 3 5 mg (10/1/2019), 3 5 mg (10/8/2019), 3 5 mg (10/15/2019), 3 5 mg (1/6/2020), 3 5 mg (1/13/2020), 3 5 mg (1/20/2020), 3 5 mg (1/27/2020), 3 5 mg (2/11/2020), 3 5 mg (2/17/2020), 3 5 mg (2/24/2020), 3 5 mg (3/2/2020), 3 5 mg (3/16/2020), 3 5 mg (3/23/2020), 3 5 mg (3/30/2020), 3 5 mg (4/6/2020), 3 5 mg (4/20/2020), 3 5 mg (4/27/2020), 3 5 mg (5/4/2020), 3 5 mg (5/11/2020), 3 5 mg (5/26/2020), 3 5 mg (6/1/2020), 3 5 mg (2020), 3 5 mg (6/15/2020), 3 5 mg (2020), 3 4 mg (2020), 3 4 mg (2020), 3 5 mg (2020), 3 4 mg (2020), 3 4 mg (2020), 3 4 mg (2020), 3 4 mg (10/5/2020), 3 4 mg (2020), 3 4 mg (2020), 3 4 mg (2020), 3 4 mg (2020), 3 4 mg (2020), 3 4 mg (2020), 3 4 mg (2020), 3 4 mg (2021), 3 4 mg (2021), 3 4 mg (2021), 3 4 mg (2/3/2021), 3 4 mg (2021), 3 4 mg (2/15/2021), 3 4 mg (2021), 3 4 mg (3/9/2021), 3 4 mg (3/16/2021), 3 4 mg (3/23/2021), 3 4 mg (3/30/2021), 3 4 mg (2021), 3 4 mg (2021), 3 4 mg (2021), 3 4 mg (2021), 3 5 mg (2021)         Current Hematologic/ Oncologic Treatment:    Velcade and Decadron    ECO - Symptomatic, <50% confined to bed    Interval History:   The patient presents for routine follow up  He was last seen on 21  Most recent blood work completed on  was reviewed  Calcium 8 4  BUN 34, creatinine 1 24  Alk Phos 162  CBC acceptable range  Hemoglobin 11 0  White count 6 3, platelets 668  Myeloma labs from  are stable  No M spike  Beta-2 3 2, free light chain ratio normal, IgM mildly 23  IgG mildly low 629, IgA normal      He started cycle 15 on   He is scheduled for day 8 on   Overall, patient states he is feeling good, he is at his baseline  Appetite remains good  No N/V  Occasional constipation  Denies any bony pain  He continues to receive therapy from [facility] twice a week  Cancer Staging:  Cancer Staging  No matching staging information was found for the patient  Molecular Testing:         Test Results:    Imaging: No results found      Labs:   Lab Results   Component Value Date    WBC 6 43 2020    HGB 12 6 2020    HCT 41 5 2020    MCV 87 2020     2020     Lab Results   Component Value Date     01/15/2018    K 4 1 2020     2020    CO2 32 2020    ANIONGAP 11 2015    BUN 22 11/02/2020    CREATININE 1 19 11/02/2020    GLUCOSE 195 (H) 08/30/2020    GLUF 141 (H) 02/26/2019    CALCIUM 8 8 11/02/2020    CORRECTEDCA 9 4 11/02/2020    AST 20 11/02/2020    ALT 16 11/02/2020    ALKPHOS 172 (H) 11/02/2020    PROT 7 1 01/15/2018    BILITOT 0 6 01/15/2018    EGFR 62 11/02/2020         Review of Systems   Gastrointestinal: Positive for constipation  Musculoskeletal: Positive for arthralgias and gait problem  All other systems reviewed and are negative          Active Problems:   Patient Active Problem List   Diagnosis    Diabetic foot ulcer ([facility] )    Diabetes mellitus type 2, uncontrolled ([place] [address] )    Chronic venous hypertension, right    Essential hypertension    Chronic atrial fibrillation (HCC)    Morbid obesity (HCC)    Chronic diastolic congestive heart failure (HCC)    Cardiomyopathy (HCC)    Diabetes, poly[facility]uropathy ([facility] )    GERD (gastroesophageal reflux disease)    Hyperlipidemia    Onychomycosis    Gallstones    Localized edema    Peripheral vascular disease (HCC)    SOB (shortness of breath)    NALLELY (obstructive sleep apnea)    Moderate persistent asthma without complication    Multiple myeloma (HCC)    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)    Hiatal hernia    Weakness    Type 2 acute myocardial infarction (HCC)    Chronic obstructive pulmonary disease, unspecified ([facility] )    Hypertensive chronic kidney disease w stg 1-4/unsp chr kdny    Mass of psoas muscle    CKD (chronic kidney disease)    Chronic diastolic (congestive) heart failure (HCC)    Lymphedema    Rash    Difficulty in walking, not elsewhere classified    Gout    Venous insufficiency (chronic) (peripheral)    Sepsis due to COVID-19 pneumonia ([facility] )    Fall from bed    Need for influenza vaccination    Venous hypertension, chronic, with ulcer and inflammation, right ([facility] )       Past Medical History:   Past Medical History:   Diagnosis Date    Cancer ([facility] )     CHF (congestive heart failure) (HCC)     Chronic kidney disease     COPD (chronic obstructive pulmonary disease) ([facility] )     COVID-19     Decubitus ulcer of heel     LAST ASSESSED [id number]    Diabetes mellitus (HCC)     History of varicose veins     Hypertension     Intermittent claudication (HCC)     Neuropathy     Seasonal allergies        Surgical History:   Past Surgical History:   Procedure Laterality Date    AMPUTATION ABOVE KNEE (AKA) Left 7/31/2019    Procedure: AMPUTATION ABOVE KNEE (AKA); Surgeon: Lori Hugo MD;  Location: [facility];  Service: General    ANGIOPLASTY      W/ FLUOROSC ANGIOGRAPGY PERIPHERAL ARTERY ADDITIONAL  LAST ASSESSED [id number]    ANGIOPLASTY / STENTING FEMORAL      TANSCATH INTRAVASCULAR STENT PLACEMENT PERCUTANEOUS FEMORAL     COLONOSCOPY  2010    CT BONE MARROW BIOPSY AND ASPIRATION  8/9/2019    FULL THICKNESS SKIN GRAFT      TENDON REPAIR      TOE AMPUTATION Left 12/27/2018    Procedure: AMPUTATION left 4th TOE;  Surgeon: Jakob Matt DPM;  Location: [facility];  Service: Podiatry       Family History:    Family History   Problem Relation Age of Onset    Other Mother         CARDIAC DISORDER     Diabetes Mother     Cancer Father     Other Family         CARDIAC DISORDER     Diabetes Family     Cancer Family     Mental illness Family     Kidney disease Sister     Diabetes Sister        Cancer-related family history includes Cancer in his family and father      Social History:   Social History     Socioeconomic History    Marital status:      Spouse name: Not on file    Number of children: 1    Years of education: Not on file    Highest education level: Not on file   Occupational History    Occupation: RETIRED   Social Needs    Financial resource strain: Not on file    Food insecurity     Worry: Not on file     Inability: Not on file   [employer] needs     Medical: Not on file     Non-medical: Not on file   Tobacco Use    Smoking status: Never Smoker    Smokeless tobacco: Never Used   Substance and Sexual Activity    Alcohol use: Not Currently    Drug use: Not Currently    Sexual activity: Not Currently   Lifestyle    Physical activity     Days per week: Not on file     Minutes per session: Not on file    Stress: Not on file   Relationships    Social connections     Talks on phone: Not on file     Gets together: Not on file     Attends Protestant service: Not on file     Active member of club or organization: Not on file     Attends meetings of clubs or organizations: Not on file     Relationship status: Not on file    Intimate partner violence     Fear of current or ex partner: Not on file     Emotionally abused: Not on file     Physically abused: Not on file     Forced sexual activity: Not on file   Other Topics Concern    Not on file   Social History Narrative    DENIED [address]    FEELS SAFE AT HOME    LIVES WITH FAMILY - SISTER    NO LIVING WILL    POA IN EXISTENCE     SUPPORTIVE AND SAFE    One son, 2 granddaughters       Current Medications:   Current Outpatient Medications   Medication Sig Dispense Refill    acetaminophen (TYLENOL) 500 mg tablet Take 1 tablet (500 mg total) by mouth every 6 (six) hours as needed for mild pain, headaches or fever 90 tablet 1    albuterol (PROVENTIL HFA,VENTOLIN HFA) 90 mcg/act inhaler Inhale 2 puffs every 6 (six) hours as needed for wheezing 1 Inhaler 0    Alcohol Swabs (ALCOHOL PREP) 70 % PADS   0    allopurinol (ZYLOPRIM) 300 mg tablet TAKE ONE TABLET BY MOUTH DAILY (Patient taking differently: 100 mg ) 30 tablet 5    ammonium lactate (LAC-HYDRIN) 12 % lotion APPLY TO BLE TOPICALLY DAILY 400 g 2    atorvastatin (LIPITOR) 40 mg tablet Take 1 tablet (40 mg total) by mouth daily after dinner 30 tablet 0    BD AUTOSHIELD DUO 30G X 5 MM MISC USE AS DIRECTED WITH INSULIN FOUR TIMES A  each 3    BD INSULIN SYRINGE U/F 31G X 5/16" 0 5 ML MISC USE AS DIRECTED WITH NOVOLIN 70/30  0    BD PEN NEEDLE HILARIO U/F 32G X 4 MM MISC by Other route 3 (three) times a day 300 each 1    bisacodyl (bisacodyl) 5 mg EC tablet Take 5 mg by mouth daily as needed for constipation      bisacodyl (DULCOLAX) 10 mg suppository Insert 10 mg into the rectum as needed for constipation      bortezomib (VELCADE) Infuse into a venous catheter once      clotrimazole (LOTRIMIN) 1 % cream APPLY TO BUTTOCK 2 TIMES DAILY 45 g 3    Colcrys 0 6 MG tablet       Easy Touch Safety Lancets 28G MISC USE 4 TIMES DAILY TO TEST BLOOD SUGAR 100 each 5    fluticasone-salmeterol (ADVAIR DISKUS, WIXELA INHUB) 250-50 mcg/dose inhaler Inhale 1 puff 2 (two) times a day Rinse mouth after use   1 Inhaler 5    Glutose 15 40 %       insulin glargine (LANTUS SOLOSTAR) 100 units/mL injection pen Inject 22 Units under the skin daily (Patient taking differently: Inject 24 Units under the skin daily at bedtime ) 5 pen 5    magnesium hydroxide (MILK OF MAGNESIA) 400 mg/5 mL oral suspension Take 30 mL by mouth daily as needed for constipation      Melatonin 5 MG TABS TAKE ONE TABLET BY MOUTH EVERY NIGHT AT BEDTIME 30 tablet 2    metFORMIN (GLUCOPHAGE) 1000 MG tablet TAKE ONE TABLET BY MOUTH TWO TIMES A DAY (Patient taking differently: Take 1,000 mg by mouth daily at bedtime ) 60 tablet 2    metoprolol tartrate (LOPRESSOR) 25 mg tablet Take 0 5 tablets (12 5 mg total) by mouth every 12 (twelve) hours 30 tablet 0    multivitamin-minerals therapeutic (THERA-M)       NOVOLOG FLEXPEN 100 units/mL SOPN INJ 5U BEFORE MEALS AND PER SS <250=NO CALL 250-300=5U,301-350= 10U,351-400=15U,401-450=20U>451 CALL FOR ORDERS UP TO 4X PER DAY 15 mL 5    ondansetron (ZOFRAN) 4 mg tablet ondansetron HCl 4 mg tablet   TAKE 1 TABLET BY MOUTH EVERY 8 HOURS AS NEEDED      torsemide (DEMADEX) 10 mg tablet TAKE 2 TABLETS BY MOUTH TWO TIMES A  tablet 2    warfarin (COUMADIN) 1 mg tablet Take 1 mg by mouth daily at bedtime 2 mg on Wed and Sun      warfarin (COUMADIN) 6 mg tablet       Insulin Pen Needle 31G X 5 MM MISC by Does not apply route 2 (two) times a day for 50 days 100 each 0     No current facility-administered medications for this visit  Allergies: Allergies   Allergen Reactions    Latex Rash       Physical Exam:  /58 (BP Location: Right arm)   Pulse 79   Temp 98 5 °F (36 9 °C) (Temporal)   Resp 16   SpO2 99%   There is no height or weight on file to calculate BSA  Wt Readings from Last 3 Encounters:   05/18/21 (!) 142 kg (312 lb)   05/04/21 (!) 150 kg (330 lb)   04/27/21 (!) 140 kg (308 lb)           Physical Exam  Constitutional:       General: He is not in acute distress  Appearance: He is obese  He is not toxic-appearing  HENT:      Head: Normocephalic and atraumatic  Eyes:      General: No scleral icterus  Right eye: No discharge  Left eye: No discharge  Conjunctiva/sclera: Conjunctivae normal    Neck:      Musculoskeletal: Normal range of motion  Cardiovascular:      Rate and Rhythm: Normal rate and regular rhythm  Pulmonary:      Effort: Pulmonary effort is normal       Breath sounds: Normal breath sounds  Abdominal:      General: Bowel sounds are normal       Palpations: Abdomen is soft  Tenderness: There is no abdominal tenderness  Musculoskeletal:      Comments: L BKA   Neurological:      General: No focal deficit present  Mental Status: He is alert and oriented to person, place, and time  Psychiatric:         Mood and Affect: Mood normal          Behavior: Behavior normal          Assessment / Plan:    1  Multiple myeloma not having achieved remission [facility])    The patient is a very pleasant 68 yo male with multiple comorbidities including diabetes and hypertension with bone marrow biopsy proven multiple myeloma   He was referred to us in 8/2019 after 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  He was noted to have elevated kidney function and anemia which prompted work up for multiple myeloma  His skeletal survey was negative  He underwent bone marrow biopsy confirming multiple myeloma  He was initiated on treatment with with Velcade and Decadron weekly on a 35 day schedule  Velcade is 1 3 milligrams/meter squared and Decadron is 20 mg p o  On days 1, 8, 15, 22  Skeletal survey from 11/2020 showed no evidence of lytic bony lesions  His myeloma labs are stable  No detectable M spike  Blood work remains in acceptable treatment range  He continues to tolerate his treatment well  He will continue with current regimen without change  He will return for follow up in 6 weeks with repeat blood work  He will have labs done prior to treatments as planned  Patient was in agreement with plan of care  He was instructed to call with any questions or concerns prior to his next office visit         Goals and Barriers:  Current Goal:  Prolong Survival from multiple myeloma   Barriers: None  Patient's Capacity to Self Care:  Patient able to self care  Portions of the record may have been created with voice recognition software  Occasional wrong word or "sound a like" substitutions may have occurred due to the inherent limitations of voice recognition software  Read the chart carefully and recognize, using context, where substitutions have occurred

## 2021-05-21 ENCOUNTER — OFFICE VISIT (OUTPATIENT)
Dept: HEMATOLOGY ONCOLOGY | Facility: HOSPITAL | Age: 71
End: 2021-05-21
Payer: COMMERCIAL

## 2021-05-21 ENCOUNTER — TELEPHONE (OUTPATIENT)
Dept: HEMATOLOGY ONCOLOGY | Facility: CLINIC | Age: 71
End: 2021-05-21

## 2021-05-21 VITALS
TEMPERATURE: 98.5 F | SYSTOLIC BLOOD PRESSURE: 106 MMHG | OXYGEN SATURATION: 99 % | HEART RATE: 79 BPM | RESPIRATION RATE: 16 BRPM | DIASTOLIC BLOOD PRESSURE: 58 MMHG

## 2021-05-21 DIAGNOSIS — C90.00 MULTIPLE MYELOMA NOT HAVING ACHIEVED REMISSION (HCC): Primary | ICD-10-CM

## 2021-05-21 PROCEDURE — 3078F DIAST BP <80 MM HG: CPT | Performed by: NURSE PRACTITIONER

## 2021-05-21 PROCEDURE — 99214 OFFICE O/P EST MOD 30 MIN: CPT | Performed by: NURSE PRACTITIONER

## 2021-05-21 PROCEDURE — 3074F SYST BP LT 130 MM HG: CPT | Performed by: NURSE PRACTITIONER

## 2021-05-21 PROCEDURE — 1160F RVW MEDS BY RX/DR IN RCRD: CPT | Performed by: NURSE PRACTITIONER

## 2021-05-21 RX ORDER — DEXAMETHASONE 4 MG/1
20 TABLET ORAL ONCE
Status: CANCELLED
Start: 2021-07-06

## 2021-05-21 RX ORDER — DEXAMETHASONE 4 MG/1
20 TABLET ORAL ONCE
Status: CANCELLED
Start: 2021-07-13

## 2021-05-21 RX ORDER — DEXAMETHASONE 4 MG/1
20 TABLET ORAL ONCE
Status: CANCELLED
Start: 2021-06-22

## 2021-05-21 RX ORDER — DEXAMETHASONE 4 MG/1
20 TABLET ORAL ONCE
Status: CANCELLED
Start: 2021-06-29

## 2021-05-21 NOTE — TELEPHONE ENCOUNTER
Davy Marquez from [facility] called to get patients treatment schedule  Treatment schedule provided

## 2021-05-25 ENCOUNTER — HOSPITAL ENCOUNTER (OUTPATIENT)
Dept: INFUSION CENTER | Facility: HOSPITAL | Age: 71
Discharge: HOME | End: 2021-05-25
Attending: INTERNAL MEDICINE
Payer: COMMERCIAL

## 2021-05-25 VITALS
BODY MASS INDEX: 38.92 KG/M2 | OXYGEN SATURATION: 99 % | HEART RATE: 56 BPM | WEIGHT: 313 LBS | HEIGHT: 75 IN | DIASTOLIC BLOOD PRESSURE: 69 MMHG | SYSTOLIC BLOOD PRESSURE: 148 MMHG | TEMPERATURE: 97.6 F | RESPIRATION RATE: 16 BRPM

## 2021-05-25 DIAGNOSIS — C90.00 MULTIPLE MYELOMA NOT HAVING ACHIEVED REMISSION (HCC): Primary | ICD-10-CM

## 2021-05-25 PROCEDURE — 96401 CHEMO ANTI-NEOPL SQ/IM: CPT

## 2021-05-25 RX ORDER — DEXAMETHASONE 4 MG/1
20 TABLET ORAL ONCE
Status: COMPLETED | OUTPATIENT
Start: 2021-05-25 | End: 2021-05-25

## 2021-05-25 RX ADMIN — BORTEZOMIB 3.5 MG: 3.5 INJECTION, POWDER, LYOPHILIZED, FOR SOLUTION INTRAVENOUS; SUBCUTANEOUS at 11:50

## 2021-05-25 RX ADMIN — DEXAMETHASONE 20 MG: 4 TABLET ORAL at 11:19

## 2021-05-25 NOTE — PLAN OF CARE
Problem: Potential for Falls  Goal: Patient will remain free of falls  Description: INTERVENTIONS:  - Assess patient frequently for physical needs  -  Identify cognitive and physical deficits and behaviors that affect risk of falls    -  [place] fall precautions as indicated by assessment   - Educate patient/family on patient safety including physical limitations  - Instruct patient to call for assistance with activity based on assessment  - Modify environment to reduce risk of injury  - Consider OT/PT consult to assist with strengthening/mobility  Outcome: Progressing

## 2021-05-25 NOTE — PROGRESS NOTES
Pt here for Velcade; given in the R abd with no adv reactions; bandaid dry and intact; AVS given; pt taken out in wc to

## 2021-06-01 ENCOUNTER — HOSPITAL ENCOUNTER (OUTPATIENT)
Dept: INFUSION CENTER | Facility: HOSPITAL | Age: 71
Discharge: HOME | End: 2021-06-01
Attending: INTERNAL MEDICINE
Payer: COMMERCIAL

## 2021-06-01 VITALS
HEIGHT: 75 IN | SYSTOLIC BLOOD PRESSURE: 144 MMHG | OXYGEN SATURATION: 96 % | DIASTOLIC BLOOD PRESSURE: 70 MMHG | RESPIRATION RATE: 16 BRPM | BODY MASS INDEX: 39.04 KG/M2 | WEIGHT: 314 LBS | TEMPERATURE: 98.2 F | HEART RATE: 66 BPM

## 2021-06-01 DIAGNOSIS — C90.00 MULTIPLE MYELOMA NOT HAVING ACHIEVED REMISSION (HCC): Primary | ICD-10-CM

## 2021-06-01 PROCEDURE — 96401 CHEMO ANTI-NEOPL SQ/IM: CPT

## 2021-06-01 RX ORDER — DEXAMETHASONE 4 MG/1
20 TABLET ORAL ONCE
Status: COMPLETED | OUTPATIENT
Start: 2021-06-01 | End: 2021-06-01

## 2021-06-01 RX ADMIN — DEXAMETHASONE 20 MG: 4 TABLET ORAL at 09:59

## 2021-06-01 RX ADMIN — BORTEZOMIB 3.5 MG: 3.5 INJECTION, POWDER, LYOPHILIZED, FOR SOLUTION INTRAVENOUS; SUBCUTANEOUS at 10:22

## 2021-06-01 NOTE — PLAN OF CARE
Problem: Potential for Falls  Goal: Patient will remain free of falls  Description: INTERVENTIONS:  - Assess patient frequently for physical needs  -  Identify cognitive and physical deficits and behaviors that affect risk of falls  -  [place] fall precautions as indicated by assessment   - Educate patient/family on patient safety including physical limitations  - Instruct patient to call for assistance with activity based on assessment  - Modify environment to reduce risk of injury  - Consider OT/PT consult to assist with strengthening/mobility  Outcome: Progressing     Problem: Knowledge Deficit  Goal: Patient/family/caregiver demonstrates understanding of disease process, treatment plan, medications, and discharge instructions  Description: Complete learning assessment and assess knowledge base    Interventions:  - Provide teaching at level of understanding  - Provide teaching via preferred learning methods  Outcome: Progressing

## 2021-06-01 NOTE — PROGRESS NOTES
Pt tolerated chemo injection in right abdomen  Bandaid intact  Pt remained in wc and escorted to lobby to meet [facility] transport  AVS printed and given to pt

## 2021-06-03 ENCOUNTER — ANTICOAG VISIT (OUTPATIENT)
Dept: CARDIOLOGY CLINIC | Facility: CLINIC | Age: 71
End: 2021-06-03

## 2021-06-03 DIAGNOSIS — I48.20 CHRONIC ATRIAL FIBRILLATION (HCC): ICD-10-CM

## 2021-06-03 LAB — INR PPP: 2.2 (ref 0.84–1.19)

## 2021-06-03 NOTE — PROGRESS NOTES
Called patient same dose recheck in 3 weeks  Faxed tp [facility] and [facility]

## 2021-06-07 ENCOUNTER — TELEPHONE (OUTPATIENT)
Dept: HEMATOLOGY ONCOLOGY | Facility: CLINIC | Age: 71
End: 2021-06-07

## 2021-06-07 NOTE — TELEPHONE ENCOUNTER
Appointment Confirmation     Appointment with  Infusion   Appointment date & time 6-8-2021 @ 9:00am    Location [place]    Patient verbilized Understanding

## 2021-06-08 ENCOUNTER — HOSPITAL ENCOUNTER (OUTPATIENT)
Dept: INFUSION CENTER | Facility: HOSPITAL | Age: 71
Discharge: HOME | End: 2021-06-08
Attending: INTERNAL MEDICINE
Payer: COMMERCIAL

## 2021-06-08 VITALS
RESPIRATION RATE: 16 BRPM | OXYGEN SATURATION: 98 % | WEIGHT: 315 LBS | HEIGHT: 75 IN | SYSTOLIC BLOOD PRESSURE: 132 MMHG | TEMPERATURE: 97.2 F | HEART RATE: 62 BPM | BODY MASS INDEX: 39.17 KG/M2 | DIASTOLIC BLOOD PRESSURE: 63 MMHG

## 2021-06-08 DIAGNOSIS — C90.00 MULTIPLE MYELOMA NOT HAVING ACHIEVED REMISSION (HCC): Primary | ICD-10-CM

## 2021-06-08 PROCEDURE — 96401 CHEMO ANTI-NEOPL SQ/IM: CPT

## 2021-06-08 RX ORDER — DEXAMETHASONE 4 MG/1
20 TABLET ORAL ONCE
Status: COMPLETED | OUTPATIENT
Start: 2021-06-08 | End: 2021-06-08

## 2021-06-08 RX ADMIN — BORTEZOMIB 3.5 MG: 3.5 INJECTION, POWDER, LYOPHILIZED, FOR SOLUTION INTRAVENOUS; SUBCUTANEOUS at 09:58

## 2021-06-08 RX ADMIN — DEXAMETHASONE 20 MG: 4 TABLET ORAL at 09:26

## 2021-06-08 NOTE — PROGRESS NOTES
Pt here for Velcade SQ injection, arrived via w/c   Velcade given SQ rt side of abdomen  Leanne well  Dsd applied  Schedule reviewed  Disch via w/c to transport van

## 2021-06-08 NOTE — PLAN OF CARE
Problem: Potential for Falls  Goal: Patient will remain free of falls  Description: INTERVENTIONS:  - Assess patient frequently for physical needs  -  Identify cognitive and physical deficits and behaviors that affect risk of falls    -  [place] fall precautions as indicated by assessment   - Educate patient/family on patient safety including physical limitations  - Instruct patient to call for assistance with activity based on assessment  - Modify environment to reduce risk of injury  - Consider OT/PT consult to assist with strengthening/mobility  Outcome: Progressing

## 2021-06-21 ENCOUNTER — TELEPHONE (OUTPATIENT)
Dept: HEMATOLOGY ONCOLOGY | Facility: CLINIC | Age: 71
End: 2021-06-21

## 2021-06-21 NOTE — TELEPHONE ENCOUNTER
Appointment Confirmation     Appointment with infusion   Appointment date & time 6-22 12pm   Location [place]   Patient verbilized Understanding Yes tori @ [facility] [employer]

## 2021-06-22 ENCOUNTER — HOSPITAL ENCOUNTER (OUTPATIENT)
Dept: INFUSION CENTER | Facility: HOSPITAL | Age: 71
Discharge: HOME | End: 2021-06-22
Attending: INTERNAL MEDICINE
Payer: COMMERCIAL

## 2021-06-22 VITALS
OXYGEN SATURATION: 98 % | DIASTOLIC BLOOD PRESSURE: 65 MMHG | HEIGHT: 75 IN | RESPIRATION RATE: 18 BRPM | BODY MASS INDEX: 39.17 KG/M2 | TEMPERATURE: 97.3 F | HEART RATE: 72 BPM | SYSTOLIC BLOOD PRESSURE: 133 MMHG | WEIGHT: 315 LBS

## 2021-06-22 DIAGNOSIS — C90.00 MULTIPLE MYELOMA NOT HAVING ACHIEVED REMISSION (HCC): Primary | ICD-10-CM

## 2021-06-22 PROCEDURE — 96401 CHEMO ANTI-NEOPL SQ/IM: CPT

## 2021-06-22 RX ORDER — DEXAMETHASONE 4 MG/1
20 TABLET ORAL ONCE
Status: COMPLETED | OUTPATIENT
Start: 2021-06-22 | End: 2021-06-22

## 2021-06-22 RX ADMIN — DEXAMETHASONE 20 MG: 4 TABLET ORAL at 12:23

## 2021-06-22 RX ADMIN — BORTEZOMIB 3.5 MG: 3.5 INJECTION, POWDER, LYOPHILIZED, FOR SOLUTION INTRAVENOUS; SUBCUTANEOUS at 12:45

## 2021-06-24 ENCOUNTER — ANTICOAG VISIT (OUTPATIENT)
Dept: CARDIOLOGY CLINIC | Facility: CLINIC | Age: 71
End: 2021-06-24

## 2021-06-24 DIAGNOSIS — I48.20 CHRONIC ATRIAL FIBRILLATION (HCC): Primary | ICD-10-CM

## 2021-06-24 LAB — INR PPP: 2 (ref 0.84–1.19)

## 2021-06-24 NOTE — PROGRESS NOTES
Tc to [address] [place] [address], spoke with staff member, will fax to continue current dose, inr due 3 weeks  Also faxed to pharmacy

## 2021-06-28 ENCOUNTER — TELEPHONE (OUTPATIENT)
Dept: HEMATOLOGY ONCOLOGY | Facility: CLINIC | Age: 71
End: 2021-06-28

## 2021-06-28 NOTE — TELEPHONE ENCOUNTER
Appointment Confirmation     Appointment with  infusion   Appointment date & time 06/29 at 11:30am    Location [place]   Patient verbilized Understanding Yes

## 2021-06-29 ENCOUNTER — HOSPITAL ENCOUNTER (OUTPATIENT)
Dept: INFUSION CENTER | Facility: HOSPITAL | Age: 71
Discharge: HOME | End: 2021-06-29
Attending: INTERNAL MEDICINE
Payer: COMMERCIAL

## 2021-06-29 VITALS
SYSTOLIC BLOOD PRESSURE: 132 MMHG | TEMPERATURE: 97.9 F | WEIGHT: 315 LBS | RESPIRATION RATE: 18 BRPM | HEIGHT: 75 IN | HEART RATE: 66 BPM | OXYGEN SATURATION: 96 % | BODY MASS INDEX: 39.17 KG/M2 | DIASTOLIC BLOOD PRESSURE: 64 MMHG

## 2021-06-29 DIAGNOSIS — C90.00 MULTIPLE MYELOMA NOT HAVING ACHIEVED REMISSION (HCC): Primary | ICD-10-CM

## 2021-06-29 PROCEDURE — 96401 CHEMO ANTI-NEOPL SQ/IM: CPT

## 2021-06-29 RX ORDER — DEXAMETHASONE 4 MG/1
20 TABLET ORAL ONCE
Status: COMPLETED | OUTPATIENT
Start: 2021-06-29 | End: 2021-06-29

## 2021-06-29 RX ADMIN — BORTEZOMIB 3.5 MG: 3.5 INJECTION, POWDER, LYOPHILIZED, FOR SOLUTION INTRAVENOUS; SUBCUTANEOUS at 11:59

## 2021-06-29 RX ADMIN — DEXAMETHASONE 20 MG: 4 TABLET ORAL at 11:35

## 2021-06-29 NOTE — PROGRESS NOTES
Pt arrived on unit for velcade injection  Pt offers no complaints at this time  Next appt made  Pt tolerated injection to rt side of abdomen  Pt exited unit via wheelchair

## 2021-07-01 NOTE — PROGRESS NOTES
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  He was noted to have elevated kidney function and anemia which prompted work up for multiple myeloma  His skeletal survey was negative  He underwent bone marrow biopsy confirming multiple myeloma  He was initiated on treatment with with Velcade and Decadron weekly on a 35 day schedule   Velcade is 1 3 milligrams/meter squared and Decadron is 20 mg p o  On days 1, 8, 15, 22      Previous Hematologic/ Oncologic History:    Oncology History   Multiple myeloma ([facility] )   9/16/2019 Initial Diagnosis    Multiple myeloma not having achieved remission ([facility] )     9/24/2019 -  Chemotherapy    iron sucrose (VENOFER), 200 mg (100 % of original dose 200 mg), Intravenous, Once, 1 of 1 cycle  Dose modification: 200 mg (original dose 200 mg, Cycle 1, Reason: Other (Must fill in a comment))  Administration: 200 mg (9/24/2019)  bortezomib (VELCADE), 1 3 mg/m2 = 3 5 mg (81 3 % of original dose 1 6 mg/m2), Subcutaneous, Once, 16 of 23 cycles  Dose modification: 1 3 mg/m2 (original dose 1 6 mg/m2, Cycle 1, Reason: Dose Not Tolerated)  Administration: 3 5 mg (9/24/2019), 3 5 mg (10/1/2019), 3 5 mg (10/8/2019), 3 5 mg (10/15/2019), 3 5 mg (1/6/2020), 3 5 mg (1/13/2020), 3 5 mg (1/20/2020), 3 5 mg (1/27/2020), 3 5 mg (2/11/2020), 3 5 mg (2/17/2020), 3 5 mg (2/24/2020), 3 5 mg (3/2/2020), 3 5 mg (3/16/2020), 3 5 mg (3/23/2020), 3 5 mg (3/30/2020), 3 5 mg (4/6/2020), 3 5 mg (4/20/2020), 3 5 mg (4/27/2020), 3 5 mg (5/4/2020), 3 5 mg (5/11/2020), 3 5 mg (5/26/2020), 3 5 mg (6/1/2020), 3 5 mg (6/8/2020), 3 5 mg (6/15/2020), 3 5 mg (2020), 3 4 mg (2020), 3 4 mg (2020), 3 5 mg (2020), 3 4 mg (2020), 3 4 mg (2020), 3 4 mg (2020), 3 4 mg (10/5/2020), 3 4 mg (2020), 3 4 mg (2020), 3 4 mg (2020), 3 4 mg (2020), 3 4 mg (2020), 3 4 mg (2020), 3 4 mg (2020), 3 4 mg (2021), 3 4 mg (2021), 3 4 mg (2021), 3 4 mg (2/3/2021), 3 4 mg (2021), 3 4 mg (2/15/2021), 3 4 mg (2021), 3 4 mg (3/9/2021), 3 4 mg (3/16/2021), 3 4 mg (3/23/2021), 3 4 mg (3/30/2021), 3 4 mg (2021), 3 4 mg (2021), 3 4 mg (2021), 3 4 mg (2021), 3 5 mg (2021), 3 5 mg (2021), 3 5 mg (2021), 3 5 mg (2021), 3 5 mg (2021), 3 5 mg (2021)         Current Hematologic/ Oncologic Treatment:    Velcade and Decadron    ECO - Symptomatic, <50% confined to bed    Interval History:   The patient presents for routine follow up  He was last seen on   Most recent blood work completed on  was reviewed  Calcium 8 7  BUN 46, creatinine 1  11  Alk Phos 125  CBC acceptable range  Hemoglobin 11 1  White count 8, platelets 677  Myeloma labs from  are stable  No M spike  Free light chain ratio normal    He started cycle 16 on   He is scheduled for day 15 on   Overall, patient states he is feeling good, he is at his baseline  Appetite remains good  No N/V  Occasional constipation  Denies any bony pain  Cancer Staging:  Cancer Staging  No matching staging information was found for the patient  Molecular Testing:         Test Results:    Imaging: No results found      Labs:   Lab Results   Component Value Date    WBC 6 43 2020    HGB 12 6 2020    HCT 41 5 2020    MCV 87 2020     2020     Lab Results   Component Value Date     01/15/2018    K 4 1 2020     2020    CO2 32 2020    ANIONGAP 11 2015    BUN 22 2020    CREATININE 1 19 2020    GLUCOSE 195 (H) pulmonary disease) ([facility] )     COVID-19     Decubitus ulcer of heel     LAST ASSESSED [id number]    Diabetes mellitus ([facility] )     History of varicose veins     Hypertension     Intermittent claudication (HCC)     Neuropathy     Seasonal allergies        Surgical History:   Past Surgical History:   Procedure Laterality Date    AMPUTATION ABOVE KNEE (AKA) Left 7/31/2019    Procedure: AMPUTATION ABOVE KNEE (AKA); Surgeon: Claude Acosta MD;  Location: [facility];  Service: General    ANGIOPLASTY      W/ FLUOROSC ANGIOGRAPGY PERIPHERAL ARTERY ADDITIONAL  LAST ASSESSED [id number]    ANGIOPLASTY / STENTING FEMORAL      TANSCATH INTRAVASCULAR STENT PLACEMENT PERCUTANEOUS FEMORAL     COLONOSCOPY  2010    CT BONE MARROW BIOPSY AND ASPIRATION  8/9/2019    FULL THICKNESS SKIN GRAFT      TENDON REPAIR      TOE AMPUTATION Left 12/27/2018    Procedure: AMPUTATION left 4th TOE;  Surgeon: Yesi Day DPM;  Location: [facility];  Service: Podiatry       Family History:    Family History   Problem Relation Age of Onset    Other Mother         CARDIAC DISORDER     Diabetes Mother     Cancer Father     Other Family         CARDIAC DISORDER     Diabetes Family     Cancer Family     Mental illness Family     Kidney disease Sister     Diabetes Sister        Cancer-related family history includes Cancer in his family and father      Social History:   Social History     Socioeconomic History    Marital status:      Spouse name: Not on file    Number of children: 1    Years of education: Not on file    Highest education level: Not on file   Occupational History    Occupation: RETIRED   Tobacco Use    Smoking status: Never Smoker    Smokeless tobacco: Never Used   Vaping Use    Vaping Use: Never used   Substance and Sexual Activity    Alcohol use: Not Currently    Drug use: Not Currently    Sexual activity: Not Currently   Other Topics Concern    Not on file   Social History Narrative DENIED ACTIVE ADVANCED DIRECTIVE    ALWAYS USES [employer]    CAFFEINE USE    Sabianist [employer]    FEELS SAFE AT HOME    LIVES WITH FAMILY - SISTER    NO LIVING WILL    POA IN EXISTENCE     SUPPORTIVE AND SAFE    One son, 2 granddaughters     Social Determinants of Health     Financial Resource Strain:     Difficulty of Paying Living Expenses:    Food Insecurity:     Worried About Running Out of Food in the Last Year:     Ran Out of Food in the Last Year:    Transportation Needs:     Lack of Transportation (Medical):      Lack of Transportation (Non-Medical):    Physical Activity:     Days of Exercise per Week:     Minutes of Exercise per Session:    Stress:     Feeling of Stress :    Social Connections:     Frequency of Communication with Friends and Family:     Frequency of Social Gatherings with Friends and Family:     Attends Nondenominational Services:     Active Member of Clubs or Organizations:     Attends Club or Organization Meetings:     Marital Status:    Intimate Partner Violence:     Fear of Current or Ex-Partner:     Emotionally Abused:     Physically Abused:     Sexually Abused:        Current Medications:   Current Outpatient Medications   Medication Sig Dispense Refill    acetaminophen (TYLENOL) 500 mg tablet Take 1 tablet (500 mg total) by mouth every 6 (six) hours as needed for mild pain, headaches or fever 90 tablet 1    albuterol (PROVENTIL HFA,VENTOLIN HFA) 90 mcg/act inhaler Inhale 2 puffs every 6 (six) hours as needed for wheezing 1 Inhaler 0    Alcohol Swabs (ALCOHOL PREP) 70 % PADS   0    allopurinol (ZYLOPRIM) 300 mg tablet TAKE ONE TABLET BY MOUTH DAILY (Patient taking differently: 100 mg ) 30 tablet 5    ammonium lactate (LAC-HYDRIN) 12 % lotion APPLY TO BLE TOPICALLY DAILY 400 g 2    atorvastatin (LIPITOR) 40 mg tablet Take 1 tablet (40 mg total) by mouth daily after dinner 30 tablet 0    BD AUTOSHIELD DUO 30G X 5 MM MISC USE AS DIRECTED WITH INSULIN FOUR TIMES A  each 3    BD INSULIN SYRINGE U/F 31G X 5/16" 0 5 ML MISC USE AS DIRECTED WITH NOVOLIN 70/30  0    BD PEN NEEDLE HILARIO U/F 32G X 4 MM MISC by Other route 3 (three) times a day 300 each 1    bisacodyl (bisacodyl) 5 mg EC tablet Take 5 mg by mouth daily as needed for constipation      bisacodyl (DULCOLAX) 10 mg suppository Insert 10 mg into the rectum as needed for constipation      bortezomib (VELCADE) Infuse into a venous catheter once      clotrimazole (LOTRIMIN) 1 % cream APPLY TO BUTTOCK 2 TIMES DAILY 45 g 3    Colcrys 0 6 MG tablet       Easy Touch Safety Lancets 28G MISC USE 4 TIMES DAILY TO TEST BLOOD SUGAR 100 each 5    fluticasone-salmeterol (ADVAIR DISKUS, WIXELA INHUB) 250-50 mcg/dose inhaler Inhale 1 puff 2 (two) times a day Rinse mouth after use   1 Inhaler 5    Glutose 15 40 %       insulin glargine (LANTUS SOLOSTAR) 100 units/mL injection pen Inject 22 Units under the skin daily (Patient taking differently: Inject 24 Units under the skin daily at bedtime ) 5 pen 5    magnesium hydroxide (MILK OF MAGNESIA) 400 mg/5 mL oral suspension Take 30 mL by mouth daily as needed for constipation      Melatonin 5 MG TABS TAKE ONE TABLET BY MOUTH EVERY NIGHT AT BEDTIME 30 tablet 2    metFORMIN (GLUCOPHAGE) 1000 MG tablet TAKE ONE TABLET BY MOUTH TWO TIMES A DAY (Patient taking differently: Take 1,000 mg by mouth daily at bedtime ) 60 tablet 2    metoprolol tartrate (LOPRESSOR) 25 mg tablet Take 0 5 tablets (12 5 mg total) by mouth every 12 (twelve) hours 30 tablet 0    multivitamin-minerals therapeutic (THERA-M)       NOVOLOG FLEXPEN 100 units/mL SOPN INJ 5U BEFORE MEALS AND PER SS <250=NO CALL 250-300=5U,301-350= 10U,351-400=15U,401-450=20U>451 CALL FOR ORDERS UP TO 4X PER DAY 15 mL 5    ondansetron (ZOFRAN) 4 mg tablet ondansetron HCl 4 mg tablet   TAKE 1 TABLET BY MOUTH EVERY 8 HOURS AS NEEDED      torsemide (DEMADEX) 10 mg tablet TAKE 2 TABLETS BY MOUTH TWO TIMES A  tablet 2    warfarin (COUMADIN) 1 mg tablet Take 1 mg by mouth daily at bedtime 2 mg on Wed and Sun      warfarin (COUMADIN) 6 mg tablet       Insulin Pen Needle 31G X 5 MM MISC by Does not apply route 2 (two) times a day for 50 days 100 each 0     No current facility-administered medications for this visit  Allergies: Allergies   Allergen Reactions    Latex Rash       Physical Exam:  /66 (BP Location: Left arm)   Pulse (!) 46   Temp 98 °F (36 7 °C) (Temporal)   Resp 16   SpO2 98%   There is no height or weight on file to calculate BSA  Wt Readings from Last 3 Encounters:   06/29/21 (!) 145 kg (319 lb)   06/22/21 (!) 145 kg (320 lb)   06/08/21 (!) 143 kg (315 lb)           Physical Exam  Constitutional:       General: He is not in acute distress  Appearance: He is obese  He is not toxic-appearing  HENT:      Head: Normocephalic and atraumatic  Eyes:      General: No scleral icterus  Right eye: No discharge  Left eye: No discharge  Conjunctiva/sclera: Conjunctivae normal    Cardiovascular:      Rate and Rhythm: Normal rate and regular rhythm  Pulmonary:      Effort: Pulmonary effort is normal       Breath sounds: Normal breath sounds  Abdominal:      General: Bowel sounds are normal       Palpations: Abdomen is soft  Musculoskeletal:      Cervical back: Normal range of motion  Comments: L BKA   Skin:     General: Skin is warm and dry  Neurological:      General: No focal deficit present  Mental Status: He is alert and oriented to person, place, and time  Psychiatric:         Mood and Affect: Mood normal          Behavior: Behavior normal          Assessment / Plan:    1  Multiple myeloma not having achieved remission [facility])    The patient is a very pleasant 68 yo male with multiple comorbidities including diabetes and hypertension with bone marrow biopsy proven multiple myeloma   He was referred to us in 8/2019 after 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  He was noted to have elevated kidney function and anemia which prompted work up for multiple myeloma  His skeletal survey was negative  He underwent bone marrow biopsy confirming multiple myeloma  He was initiated on treatment with with Velcade and Decadron weekly on a 35 day schedule  Velcade is 1 3 milligrams/meter squared and Decadron is 20 mg p o  On days 1, 8, 15, 22  Skeletal survey from 11/2020 showed no evidence of lytic bony lesions  His myeloma labs are stable  No detectable M spike  Blood work remains in acceptable treatment range  He continues to tolerate his treatment well  He will continue with current regimen without change  He will return for follow up in 2 months with repeat blood work  He will have labs done prior to treatments as planned  Patient was in agreement with plan of care  He was instructed to call with any questions or concerns prior to his next office visit         Goals and Barriers:  Current Goal:  Prolong Survival from multiple myeloma   Barriers: None  Patient's Capacity to Self Care:  Patient able to self care  Portions of the record may have been created with voice recognition software  Occasional wrong word or "sound a like" substitutions may have occurred due to the inherent limitations of voice recognition software  Read the chart carefully and recognize, using context, where substitutions have occurred

## 2021-07-02 ENCOUNTER — OFFICE VISIT (OUTPATIENT)
Dept: HEMATOLOGY ONCOLOGY | Facility: HOSPITAL | Age: 71
End: 2021-07-02
Payer: COMMERCIAL

## 2021-07-02 VITALS
HEART RATE: 46 BPM | DIASTOLIC BLOOD PRESSURE: 66 MMHG | TEMPERATURE: 98 F | OXYGEN SATURATION: 98 % | SYSTOLIC BLOOD PRESSURE: 104 MMHG | RESPIRATION RATE: 16 BRPM

## 2021-07-02 DIAGNOSIS — C90.00 MULTIPLE MYELOMA NOT HAVING ACHIEVED REMISSION (HCC): Primary | ICD-10-CM

## 2021-07-02 PROCEDURE — 99214 OFFICE O/P EST MOD 30 MIN: CPT | Performed by: NURSE PRACTITIONER

## 2021-07-02 PROCEDURE — 1160F RVW MEDS BY RX/DR IN RCRD: CPT | Performed by: NURSE PRACTITIONER

## 2021-07-06 ENCOUNTER — HOSPITAL ENCOUNTER (OUTPATIENT)
Dept: INFUSION CENTER | Facility: HOSPITAL | Age: 71
Discharge: HOME | End: 2021-07-06
Attending: INTERNAL MEDICINE
Payer: COMMERCIAL

## 2021-07-06 ENCOUNTER — HOSPITAL ENCOUNTER (OUTPATIENT)
Dept: INFUSION CENTER | Facility: HOSPITAL | Age: 71
Discharge: HOME | End: 2021-07-06
Attending: INTERNAL MEDICINE

## 2021-07-06 VITALS
BODY MASS INDEX: 39.17 KG/M2 | OXYGEN SATURATION: 96 % | WEIGHT: 315 LBS | HEIGHT: 75 IN | RESPIRATION RATE: 18 BRPM | DIASTOLIC BLOOD PRESSURE: 67 MMHG | HEART RATE: 70 BPM | TEMPERATURE: 98 F | SYSTOLIC BLOOD PRESSURE: 142 MMHG

## 2021-07-06 DIAGNOSIS — C90.00 MULTIPLE MYELOMA NOT HAVING ACHIEVED REMISSION (HCC): Primary | ICD-10-CM

## 2021-07-06 PROCEDURE — 96401 CHEMO ANTI-NEOPL SQ/IM: CPT

## 2021-07-06 RX ORDER — DEXAMETHASONE 4 MG/1
20 TABLET ORAL ONCE
Status: COMPLETED | OUTPATIENT
Start: 2021-07-06 | End: 2021-07-06

## 2021-07-06 RX ADMIN — DEXAMETHASONE 20 MG: 4 TABLET ORAL at 12:48

## 2021-07-06 RX ADMIN — BORTEZOMIB 3.5 MG: 3.5 INJECTION, POWDER, LYOPHILIZED, FOR SOLUTION INTRAVENOUS; SUBCUTANEOUS at 13:15

## 2021-07-06 NOTE — PROGRESS NOTES
Pt here for Velcade; injection given with no adverse reactions; bandaid dry and intact; AVS given; pt left unit in wc to

## 2021-07-06 NOTE — PLAN OF CARE
Problem: Potential for Falls  Goal: Patient will remain free of falls  Description: INTERVENTIONS:  - Educate patient/family on patient safety including physical limitations  - Instruct patient to call for assistance with activity   - Consult OT/PT to assist with strengthening/mobility   - Keep Call bell within reach  - Keep bed low and locked with side rails adjusted as appropriate  - Keep care items and personal belongings within reach  - Initiate and maintain comfort rounds  - Make Fall Risk Sign visible to staff  - Offer Toileting every 2 Hours, in advance of need  - Initiate/Maintain alarm  - Obtain necessary fall risk management equipment:  - Apply yellow socks and bracelet for high fall risk patients  - Consider moving patient to room near nurses station  Outcome: Progressing

## 2021-07-07 ENCOUNTER — HOSPITAL ENCOUNTER (OUTPATIENT)
Dept: INFUSION CENTER | Facility: HOSPITAL | Age: 71
Discharge: HOME | End: 2021-07-07
Attending: INTERNAL MEDICINE

## 2021-07-13 ENCOUNTER — HOSPITAL ENCOUNTER (OUTPATIENT)
Dept: INFUSION CENTER | Facility: HOSPITAL | Age: 71
Discharge: HOME | End: 2021-07-13
Attending: INTERNAL MEDICINE
Payer: COMMERCIAL

## 2021-07-13 VITALS
TEMPERATURE: 97.8 F | OXYGEN SATURATION: 96 % | RESPIRATION RATE: 16 BRPM | WEIGHT: 315 LBS | HEART RATE: 61 BPM | DIASTOLIC BLOOD PRESSURE: 63 MMHG | HEIGHT: 75 IN | SYSTOLIC BLOOD PRESSURE: 142 MMHG | BODY MASS INDEX: 39.17 KG/M2

## 2021-07-13 DIAGNOSIS — C90.00 MULTIPLE MYELOMA NOT HAVING ACHIEVED REMISSION (HCC): Primary | ICD-10-CM

## 2021-07-13 PROCEDURE — 96401 CHEMO ANTI-NEOPL SQ/IM: CPT

## 2021-07-13 RX ORDER — DEXAMETHASONE 4 MG/1
20 TABLET ORAL ONCE
Status: COMPLETED | OUTPATIENT
Start: 2021-07-13 | End: 2021-07-13

## 2021-07-13 RX ADMIN — DEXAMETHASONE 20 MG: 4 TABLET ORAL at 11:19

## 2021-07-13 RX ADMIN — BORTEZOMIB 3.5 MG: 3.5 INJECTION, POWDER, LYOPHILIZED, FOR SOLUTION INTRAVENOUS; SUBCUTANEOUS at 11:46

## 2021-07-13 NOTE — PROGRESS NOTES
Pt here for Velcade; injection given right abdomen SQ with no adverse reactions; bandaid dry and intact; AVS given; pt left unit in wc to

## 2021-07-15 ENCOUNTER — ANTICOAG VISIT (OUTPATIENT)
Dept: CARDIOLOGY CLINIC | Facility: CLINIC | Age: 71
End: 2021-07-15

## 2021-07-15 DIAGNOSIS — I48.20 CHRONIC ATRIAL FIBRILLATION (HCC): Primary | ICD-10-CM

## 2021-07-15 LAB — INR PPP: 2.5 (ref 0.84–1.19)

## 2021-07-15 NOTE — PROGRESS NOTES
Tc to [facility], spoke with Shazia Cristina, denies any bleeding or medication changes  Will fax to her and pharmacy, continue current dose, inr due 4 weeks  faxed same

## 2021-07-22 RX ORDER — DEXAMETHASONE 4 MG/1
20 TABLET ORAL ONCE
Status: CANCELLED
Start: 2021-07-27 | End: 2021-07-27

## 2021-07-22 RX ORDER — DEXAMETHASONE 4 MG/1
20 TABLET ORAL ONCE
Status: CANCELLED
Start: 2021-08-03 | End: 2021-08-03

## 2021-07-22 RX ORDER — DEXAMETHASONE 4 MG/1
20 TABLET ORAL ONCE
Status: CANCELLED
Start: 2021-08-10 | End: 2021-08-10

## 2021-07-22 RX ORDER — DEXAMETHASONE 4 MG/1
20 TABLET ORAL ONCE
Status: CANCELLED
Start: 2021-08-17 | End: 2021-08-17

## 2021-07-26 ENCOUNTER — TELEPHONE (OUTPATIENT)
Dept: HEMATOLOGY ONCOLOGY | Facility: CLINIC | Age: 71
End: 2021-07-26

## 2021-07-26 NOTE — TELEPHONE ENCOUNTER
Appointment Confirmation     Appointment with Infusion    Appointment date & time 07/27 at 11:00am   Location [facility]   Patient verbilized Understanding  yes

## 2021-07-27 ENCOUNTER — HOSPITAL ENCOUNTER (OUTPATIENT)
Dept: INFUSION CENTER | Facility: HOSPITAL | Age: 71
Discharge: HOME | End: 2021-07-27
Attending: INTERNAL MEDICINE
Payer: COMMERCIAL

## 2021-07-27 VITALS
HEIGHT: 75 IN | BODY MASS INDEX: 38.92 KG/M2 | TEMPERATURE: 98.6 F | WEIGHT: 313 LBS | HEART RATE: 69 BPM | DIASTOLIC BLOOD PRESSURE: 66 MMHG | OXYGEN SATURATION: 95 % | RESPIRATION RATE: 16 BRPM | SYSTOLIC BLOOD PRESSURE: 143 MMHG

## 2021-07-27 DIAGNOSIS — C90.00 MULTIPLE MYELOMA NOT HAVING ACHIEVED REMISSION (HCC): Primary | ICD-10-CM

## 2021-07-27 PROCEDURE — 96401 CHEMO ANTI-NEOPL SQ/IM: CPT

## 2021-07-27 RX ORDER — DEXAMETHASONE 4 MG/1
20 TABLET ORAL ONCE
Status: COMPLETED | OUTPATIENT
Start: 2021-07-27 | End: 2021-07-27

## 2021-07-27 RX ADMIN — DEXAMETHASONE 20 MG: 4 TABLET ORAL at 11:20

## 2021-07-27 RX ADMIN — BORTEZOMIB 3.5 MG: 3.5 INJECTION, POWDER, LYOPHILIZED, FOR SOLUTION INTRAVENOUS; SUBCUTANEOUS at 11:48

## 2021-07-27 NOTE — PROGRESS NOTES
Pt tolerated Velcade injection in right abdomen with no adverse reaction  Pt escorted off unit in  to meet transport  AVS printed and given to pt

## 2021-08-03 ENCOUNTER — HOSPITAL ENCOUNTER (OUTPATIENT)
Dept: INFUSION CENTER | Facility: HOSPITAL | Age: 71
Discharge: HOME | End: 2021-08-03
Attending: INTERNAL MEDICINE
Payer: COMMERCIAL

## 2021-08-03 VITALS
HEIGHT: 75 IN | BODY MASS INDEX: 39.17 KG/M2 | OXYGEN SATURATION: 96 % | DIASTOLIC BLOOD PRESSURE: 68 MMHG | TEMPERATURE: 97.8 F | RESPIRATION RATE: 18 BRPM | SYSTOLIC BLOOD PRESSURE: 140 MMHG | WEIGHT: 315 LBS | HEART RATE: 63 BPM

## 2021-08-03 DIAGNOSIS — C90.00 MULTIPLE MYELOMA NOT HAVING ACHIEVED REMISSION (HCC): Primary | ICD-10-CM

## 2021-08-03 PROCEDURE — 96401 CHEMO ANTI-NEOPL SQ/IM: CPT

## 2021-08-03 RX ORDER — DEXAMETHASONE 4 MG/1
20 TABLET ORAL ONCE
Status: COMPLETED | OUTPATIENT
Start: 2021-08-03 | End: 2021-08-03

## 2021-08-03 RX ADMIN — BORTEZOMIB 3.5 MG: 3.5 INJECTION, POWDER, LYOPHILIZED, FOR SOLUTION INTRAVENOUS; SUBCUTANEOUS at 12:29

## 2021-08-03 RX ADMIN — DEXAMETHASONE 20 MG: 4 TABLET ORAL at 12:06

## 2021-08-03 NOTE — PROGRESS NOTES
Pt tolerated Velcade injection in L abd without incident  Aware of next appt  Escorted to waiting room via wheelchair for dc

## 2021-08-03 NOTE — PLAN OF CARE
Problem: Potential for Falls  Goal: Patient will remain free of falls  Description: INTERVENTIONS:  - Educate patient/family on patient safety including physical limitations  - Instruct patient to call for assistance with activity   - Consult OT/PT to assist with strengthening/mobility   - Keep Call bell within reach  - Keep bed low and locked with side rails adjusted as appropriate  - Keep care items and personal belongings within reach  - Initiate and maintain comfort rounds  - Make Fall Risk Sign visible to staff  - Obtain necessary fall risk management equipment:  - Apply yellow socks and bracelet for high fall risk patients  - Consider moving patient to room near nurses station  Outcome: Progressing     Problem: Knowledge Deficit  Goal: Patient/family/caregiver demonstrates understanding of disease process, treatment plan, medications, and discharge instructions  Description: Complete learning assessment and assess knowledge base    Interventions:  - Provide teaching at level of understanding  - Provide teaching via preferred learning methods  Outcome: Progressing

## 2021-08-10 ENCOUNTER — HOSPITAL ENCOUNTER (OUTPATIENT)
Dept: INFUSION CENTER | Facility: HOSPITAL | Age: 71
Discharge: HOME | End: 2021-08-10
Attending: INTERNAL MEDICINE
Payer: COMMERCIAL

## 2021-08-10 VITALS
OXYGEN SATURATION: 98 % | HEIGHT: 75 IN | DIASTOLIC BLOOD PRESSURE: 67 MMHG | WEIGHT: 314 LBS | RESPIRATION RATE: 16 BRPM | HEART RATE: 73 BPM | TEMPERATURE: 96.8 F | SYSTOLIC BLOOD PRESSURE: 102 MMHG | BODY MASS INDEX: 39.04 KG/M2

## 2021-08-10 DIAGNOSIS — C90.00 MULTIPLE MYELOMA NOT HAVING ACHIEVED REMISSION (HCC): Primary | ICD-10-CM

## 2021-08-10 PROCEDURE — 96401 CHEMO ANTI-NEOPL SQ/IM: CPT

## 2021-08-10 RX ORDER — DEXAMETHASONE 4 MG/1
20 TABLET ORAL ONCE
Status: COMPLETED | OUTPATIENT
Start: 2021-08-10 | End: 2021-08-10

## 2021-08-10 RX ADMIN — BORTEZOMIB 3.5 MG: 3.5 INJECTION, POWDER, LYOPHILIZED, FOR SOLUTION INTRAVENOUS; SUBCUTANEOUS at 11:30

## 2021-08-10 RX ADMIN — DEXAMETHASONE 20 MG: 4 TABLET ORAL at 11:00

## 2021-08-10 NOTE — PROGRESS NOTES
Pt tolerated Velcade injection in R abd without incident  Aware of next appt  Escorted to waiting room via wheelchair for dc

## 2021-08-12 ENCOUNTER — ANTICOAG VISIT (OUTPATIENT)
Dept: CARDIOLOGY CLINIC | Facility: CLINIC | Age: 71
End: 2021-08-12

## 2021-08-12 DIAGNOSIS — I48.20 CHRONIC ATRIAL FIBRILLATION (HCC): Primary | ICD-10-CM

## 2021-08-12 LAB — INR PPP: 3.5 (ref 0.84–1.19)

## 2021-08-17 ENCOUNTER — HOSPITAL ENCOUNTER (OUTPATIENT)
Dept: INFUSION CENTER | Facility: HOSPITAL | Age: 71
Discharge: HOME | End: 2021-08-17
Attending: INTERNAL MEDICINE
Payer: COMMERCIAL

## 2021-08-17 VITALS
OXYGEN SATURATION: 97 % | WEIGHT: 309 LBS | BODY MASS INDEX: 38.42 KG/M2 | HEIGHT: 75 IN | HEART RATE: 73 BPM | DIASTOLIC BLOOD PRESSURE: 85 MMHG | TEMPERATURE: 97.3 F | RESPIRATION RATE: 16 BRPM | SYSTOLIC BLOOD PRESSURE: 115 MMHG

## 2021-08-17 DIAGNOSIS — C90.00 MULTIPLE MYELOMA NOT HAVING ACHIEVED REMISSION (HCC): Primary | ICD-10-CM

## 2021-08-17 PROCEDURE — 96401 CHEMO ANTI-NEOPL SQ/IM: CPT

## 2021-08-17 RX ORDER — DEXAMETHASONE 4 MG/1
20 TABLET ORAL ONCE
Status: COMPLETED | OUTPATIENT
Start: 2021-08-17 | End: 2021-08-17

## 2021-08-17 RX ADMIN — BORTEZOMIB 3.5 MG: 3.5 INJECTION, POWDER, LYOPHILIZED, FOR SOLUTION INTRAVENOUS; SUBCUTANEOUS at 11:51

## 2021-08-17 RX ADMIN — DEXAMETHASONE 20 MG: 4 TABLET ORAL at 11:16

## 2021-08-17 NOTE — PLAN OF CARE
Problem: Knowledge Deficit  Goal: Patient/family/caregiver demonstrates understanding of disease process, treatment plan, medications, and discharge instructions  Description: Complete learning assessment and assess knowledge base    Interventions:  - Provide teaching at level of understanding  - Provide teaching via preferred learning methods  Outcome: Progressing     Problem: Potential for Falls  Goal: Patient will remain free of falls  Description: INTERVENTIONS:  - Educate patient/family on patient safety including physical limitations  - Instruct patient to call for assistance with activity   - Consult OT/PT to assist with strengthening/mobility   - Keep Call bell within reach  - Keep bed low and locked with side rails adjusted as appropriate  - Keep care items and personal belongings within reach  - Initiate and maintain comfort rounds  - Make Fall Risk Sign visible to staff  - Offer Toileting every Hours, in advance of need  - Initiate/Maintain alarm  - Obtain necessary fall risk management equipment:   - Apply yellow socks and bracelet for high fall risk patients  - Consider moving patient to room near nurses station  Outcome: Progressing

## 2021-08-17 NOTE — PROGRESS NOTES
Pt arrived via w/c for Velcade injection  Pt states his weight yesterday was 309  Verified with nurse from his facility  Oral Decadron given   Velcade given SQ Lt side of abdomen  Disch via w/c to facility with their   AVS sent with pt  More appts made

## 2021-08-24 RX ORDER — DEXAMETHASONE 4 MG/1
20 TABLET ORAL ONCE
Status: CANCELLED
Start: 2021-09-21 | End: 2021-09-21

## 2021-08-24 RX ORDER — DEXAMETHASONE 4 MG/1
20 TABLET ORAL ONCE
Status: CANCELLED
Start: 2021-09-14 | End: 2021-09-14

## 2021-08-24 RX ORDER — DEXAMETHASONE 4 MG/1
20 TABLET ORAL ONCE
Status: CANCELLED
Start: 2021-08-31 | End: 2021-08-31

## 2021-08-24 RX ORDER — DEXAMETHASONE 4 MG/1
20 TABLET ORAL ONCE
Status: CANCELLED
Start: 2021-09-07 | End: 2021-09-07

## 2021-08-26 ENCOUNTER — ANTICOAG VISIT (OUTPATIENT)
Dept: CARDIOLOGY CLINIC | Facility: CLINIC | Age: 71
End: 2021-08-26

## 2021-08-26 DIAGNOSIS — I48.20 CHRONIC ATRIAL FIBRILLATION (HCC): Primary | ICD-10-CM

## 2021-08-26 LAB — INR PPP: 3.4 (ref 0.84–1.19)

## 2021-08-26 NOTE — PROGRESS NOTES
Tc to [address] [place] [address], spoke with Roseanne Price, will fax to hold warfarin x 1 day, then decrease dose 6 mg fri, 8 mg other days of the week, inr due 1 week  Faxed to [address] [place] [address] and pharmacy

## 2021-08-30 ENCOUNTER — IN HOME VISIT (OUTPATIENT)
Dept: WOUND CARE | Facility: HOSPITAL | Age: 71
End: 2021-08-30
Payer: COMMERCIAL

## 2021-08-30 DIAGNOSIS — R26.2 AMBULATORY DYSFUNCTION: ICD-10-CM

## 2021-08-30 DIAGNOSIS — L98.499 TRAUMATIC SKIN ULCER, UNSPECIFIED ULCER STAGE (HCC): Primary | ICD-10-CM

## 2021-08-30 PROCEDURE — 99335 PR DOM/R-HOME E/M EST PT LW MOD SEVERITY 25 MINUTES: CPT | Performed by: NURSE PRACTITIONER

## 2021-08-30 NOTE — PATIENT INSTRUCTIONS
Orders Placed This Encounter   Procedures    Wound cleansing and dressings     Wound:  Right middle finger and right 4th finger  Discontinue previous wound order  Cleanse the wound bed with NSS   Apply non-sting skin prep to periwound area  Apply triad paste to wound bed, then cover with band aide  Frequency :daily and prn for soiling  Offload all wounds  Turn and reposition frequently, maximum of every two hours  Instruct / Assist with weight shifting  when in chair  Increase protein intake  Monitor for any sign of infection or worsening, inform PCP or patient's primary physician in your facility       Standing Status:   Future     Standing Expiration Date:   8/30/2022

## 2021-08-30 NOTE — LETTER
Patient:  Crys Schilling   1950           NICOLA Carroll saw Crys Schilling for a wound care visit on 8/30/2021  See below for information relating to this visit  Chief Complaint   Patient presents with    New Patient Visit     Right finger, middle and 4th finger        Assessment/Plan:  1  Traumatic skin ulcer, unspecified ulcer stage ([facility] )  Assessment & Plan:  Right finger - 3rd finger and 4th finger  - 100% epithelial  - local wound care with triad  - follow up in two weeks    Orders:  -     Wound cleansing and dressings; Future    2  Ambulatory dysfunction  Assessment & Plan:  Needs assistance with positioning             Orders:  Crys Schilling  1950  Orders Placed This Encounter   Procedures    Wound cleansing and dressings     Wound:  Right middle finger and right 4th finger  Discontinue previous wound order  Cleanse the wound bed with NSS   Apply non-sting skin prep to periwound area  Apply triad paste to wound bed, then cover with band aide  Frequency :daily and prn for soiling  Offload all wounds  Turn and reposition frequently, maximum of every two hours  Instruct / Assist with weight shifting  when in chair  Increase protein intake  Monitor for any sign of infection or worsening, inform PCP or patient's primary physician in your facility  Standing Status:   Future     Standing Expiration Date:   8/30/2022         Follow Up:  Return in about 2 weeks (around 9/13/2021)  [address] hours are 8:00 am - 4:30 pm Monday through Friday  The center phone number is [phone]  You can also contact me directly thru my email at [facility]  [email or web address] or thru tiger text  If it is an emergency, please contact the PCP or patient's attending physician in your facility       Sincerely,    Electronically signed by NICOLA Carroll    Patient : Crys Schilling    1950

## 2021-08-30 NOTE — ASSESSMENT & PLAN NOTE
Right finger - 3rd finger and 4th finger  - 100% epithelial  - local wound care with triad  - follow up in two weeks

## 2021-08-30 NOTE — PROGRESS NOTES
[address] MANAGEMENT   AND [facility]       Patient ID: Valerie Contreras is a 78 y o  male Date of Birth 1950     Location of Service: [facility]    Performed wound round with: Eduardo Hayes RN      Chief Complaint   Patient presents with   [address] Patient Visit     Right finger, middle and 4th finger       Wound Instructions:  Orders Placed This Encounter   Procedures    Wound cleansing and dressings     Wound:  Right middle finger and right 4th finger  Discontinue previous wound order  Cleanse the wound bed with NSS   Apply non-sting skin prep to periwound area  Apply triad paste to wound bed, then cover with band aide  Frequency :daily and prn for soiling  Offload all wounds  Turn and reposition frequently, maximum of every two hours  Instruct / Assist with weight shifting  when in chair  Increase protein intake  Monitor for any sign of infection or worsening, inform PCP or patient's primary physician in your facility  Standing Status:   Future     Standing Expiration Date:   8/30/2022       Allergies  Latex      Assessment & Plan:  1  Traumatic skin ulcer, unspecified ulcer stage ([facility] [address] )  Assessment & Plan:  Right finger - 3rd finger and 4th finger  - 100% epithelial  - local wound care with triad  - follow up in two weeks    Orders:  -     Wound cleansing and dressings; Future    2  Ambulatory dysfunction  Assessment & Plan:  Needs assistance with positioning             Subjective: This is a 68-year-old male referred to our service for wound on the right middle finger and right index  As per patient, the wound started last week  He was opening that cabinet drawer and he accidentally bumped his fingers  As per patient the wound started as a blister and opened up  Severity, no sign of infection  Current treatment is triad paste and the wound is improving  Patient denies pain  Review of Systems   Constitutional: Negative  HENT: Negative      Eyes: Negative  Respiratory: Negative  Cardiovascular: Negative  Gastrointestinal: Negative  Endocrine: Negative  Genitourinary: Negative  Musculoskeletal: Positive for gait problem  Skin: Positive for wound  See HPI   Hematological: Negative  Psychiatric/Behavioral: Negative  All other systems reviewed and are negative  Objective: There were no vitals taken for this visit  Physical Exam  Constitutional:       Appearance: Normal appearance  HENT:      Head: Normocephalic and atraumatic  Nose: Nose normal       Mouth/Throat:      Mouth: Mucous membranes are moist    Eyes:      Conjunctiva/sclera: Conjunctivae normal    Cardiovascular:      Rate and Rhythm: Normal rate  Pulses: Normal pulses  Pulmonary:      Effort: Pulmonary effort is normal    Abdominal:      Tenderness: There is no abdominal tenderness  Musculoskeletal:      Cervical back: Normal range of motion  Comments: LROM   Skin:     Findings: Lesion present  Comments: Location Right index wound bed - 0 5 x 0 3 x 0 1 cm , no undermining, no tunneling, 100 % epithelial, 0%granulation, 0%slough, exudate - no drainage, no malodor ( assess after dressing removal and cleansing), wound edge - attached to base, periwound - intact  No localized sign of infection, Denies pain    Location Right middle finger wound bed - 1 0 x 0 3 x 0 1 cm , no undermining, no tunneling, 100 % epithelial, 0%granulation, 0%slough, exudate - no drainage, no malodor ( assess after dressing removal and cleansing), wound edge - attached to base, periwound - intact  No localized sign of infection, Denies pain       Neurological:      Mental Status: He is alert  Gait: Gait abnormal    Psychiatric:         Mood and Affect: Mood normal          Behavior: Behavior normal               Procedures           Patient's care was coordinated with nursing facility staff   Recent vitals, labs and updated medications were reviewed on EMR or chart system of facility   Past Medical, surgical, social, medication and allergy history and patient's previous records were reviewed and updated as appropriate:     Patient Active Problem List   Diagnosis    Diabetic foot ulcer ([place] [address] )    Diabetes mellitus type 2, uncontrolled ([place] [address] )    Chronic venous hypertension, right    Essential hypertension    Chronic atrial fibrillation ([place] [address] )    Morbid obesity ([place] [address] )    Chronic diastolic congestive heart failure ([place] [address] )    Cardiomyopathy ([place] [address] )    Diabetes, polyneuropathy ([place] [address] )    GERD (gastroesophageal reflux disease)    Hyperlipidemia    Onychomycosis    Gallstones    Localized edema    Peripheral vascular disease (HCC)    SOB (shortness of breath)    NALLELY (obstructive sleep apnea)    Moderate persistent asthma without complication    Multiple myeloma ([place] [address] )    Above knee amputation of left lower extremity ([place] [address] )    Hiatal hernia    Weakness    Type 2 acute myocardial infarction (HCC)    Chronic obstructive pulmonary disease, unspecified ([place] [address] )    Hypertensive chronic kidney disease w stg 1-4/unsp chr kdny    Mass of psoas muscle    CKD (chronic kidney disease)    Chronic diastolic (congestive) heart failure (HCC)    Lymphedema    Rash    Difficulty in walking, not elsewhere classified    Gout    Venous insufficiency (chronic) (peripheral)    Sepsis due to COVID-19 pneumonia ([place] [address] )    Fall from bed    Need for influenza vaccination    Venous hypertension, chronic, with ulcer and inflammation, right ([place] [address] )    Traumatic skin ulcer ([place] [address] )    Ambulatory dysfunction     Past Medical History:   Diagnosis Date    Cancer ([place] [address] )     CHF (congestive heart failure) ([place] [address] )     Chronic kidney disease     COPD (chronic obstructive pulmonary disease) ([place] [address] )     COVID-19     Decubitus ulcer of heel     LAST ASSESSED [id number]    Diabetes mellitus ([place] [address] )     History of varicose veins     Hypertension     Intermittent claudication ([place] [address] )     Neuropathy     Seasonal allergies      Past Surgical History:   Procedure Laterality Date    AMPUTATION ABOVE KNEE (AKA) Left 7/31/2019    Procedure: AMPUTATION ABOVE KNEE (AKA); Surgeon: Lillian Cummins MD;  Location: [facility];  Service: General    ANGIOPLASTY      W/ FLUOROSC ANGIOGRAPGY PERIPHERAL ARTERY ADDITIONAL  LAST ASSESSED [id number]    ANGIOPLASTY / STENTING FEMORAL      TANSCATH INTRAVASCULAR STENT PLACEMENT PERCUTANEOUS FEMORAL     COLONOSCOPY  2010    CT BONE MARROW BIOPSY AND ASPIRATION  8/9/2019    FULL THICKNESS SKIN GRAFT      TENDON REPAIR      TOE AMPUTATION Left 12/27/2018    Procedure: AMPUTATION left 4th TOE;  Surgeon: Mona Melton DPM;  Location: [facility];  Service: Podiatry     Social History     Socioeconomic History    Marital status:      Spouse name: None    Number of children: 1    Years of education: None    Highest education level: None   Occupational History    Occupation: RETIRED   Tobacco Use    Smoking status: Never Smoker    Smokeless tobacco: Never Used   Vaping Use    Vaping Use: Never used   Substance and Sexual Activity    Alcohol use: Not Currently    Drug use: Not Currently    Sexual activity: Not Currently   Other Topics Concern    None   Social History Narrative    DENIED ACTIVE ADVANCED DIRECTIVE    ALWAYS USES [employer]    CAFFEINE USE    DGIT DISCIPLES OF [employer]AN[employer]    FEELS SAFE AT HOME    LIVES WITH FAMILY - SISTER    NO LIVING WILL    POA IN EXISTENCE     SUPPORTIVE AND SAFE    One son, 2 granddaughters     Social Determinants of Health     Financial Resource Strain:     Difficulty of Paying Living Expenses:    Food Insecurity:     Worried About Running Out of Food in the Last Year:     Ran Out of Food in the Last Year:    Transportation Needs:     Lack of Transportation (Medical):      Lack of Transportation (Non-Medical):    Physical Activity:     Days of Exercise per Week:     Minutes of Exercise per Session:    Stress:  Feeling of Stress :    Social Connections:     Frequency of Communication with Friends and Family:     Frequency of Social Gatherings with Friends and Family:     Attends Restorationism Services:     Active Member of Clubs or Organizations:     Attends Club or Organization Meetings:     Marital Status:    Intimate Partner Violence:     Fear of Current or Ex-Partner:     Emotionally Abused:     Physically Abused:     Sexually Abused:         Current Outpatient Medications:     acetaminophen (TYLENOL) 500 mg tablet, Take 1 tablet (500 mg total) by mouth every 6 (six) hours as needed for mild pain, headaches or fever, Disp: 90 tablet, Rfl: 1    albuterol (PROVENTIL HFA,VENTOLIN HFA) 90 mcg/act inhaler, Inhale 2 puffs every 6 (six) hours as needed for wheezing, Disp: 1 Inhaler, Rfl: 0    Alcohol Swabs (ALCOHOL PREP) 70 % PADS, , Disp: , Rfl: 0    allopurinol (ZYLOPRIM) 300 mg tablet, TAKE ONE TABLET BY MOUTH DAILY (Patient taking differently: 100 mg ), Disp: 30 tablet, Rfl: 5    ammonium lactate (LAC-HYDRIN) 12 % lotion, APPLY TO BLE TOPICALLY DAILY, Disp: 400 g, Rfl: 2    atorvastatin (LIPITOR) 40 mg tablet, Take 1 tablet (40 mg total) by mouth daily after dinner, Disp: 30 tablet, Rfl: 0    BD AUTOSHIELD DUO 30G X 5 MM MISC, USE AS DIRECTED WITH INSULIN FOUR TIMES A DAY, Disp: 100 each, Rfl: 3    BD INSULIN SYRINGE U/F 31G X 5/16" 0 5 ML MISC, USE AS DIRECTED WITH NOVOLIN 70/30, Disp: , Rfl: 0    BD PEN NEEDLE HILARIO U/F 32G X 4 MM MISC, by Other route 3 (three) times a day, Disp: 300 each, Rfl: 1    bisacodyl (bisacodyl) 5 mg EC tablet, Take 5 mg by mouth daily as needed for constipation, Disp: , Rfl:     bisacodyl (DULCOLAX) 10 mg suppository, Insert 10 mg into the rectum as needed for constipation, Disp: , Rfl:     bortezomib (VELCADE), Infuse into a venous catheter once, Disp: , Rfl:     clotrimazole (LOTRIMIN) 1 % cream, APPLY TO BUTTOCK 2 TIMES DAILY, Disp: 45 g, Rfl: 3    Colcrys 0 6 MG tablet, , Disp: , Rfl:     Easy Touch Safety Lancets 28G MISC, USE 4 TIMES DAILY TO TEST BLOOD SUGAR, Disp: 100 each, Rfl: 5    fluticasone-salmeterol (ADVAIR DISKUS, WIXELA INHUB) 250-50 mcg/dose inhaler, Inhale 1 puff 2 (two) times a day Rinse mouth after use , Disp: 1 Inhaler, Rfl: 5    Glutose 15 40 %, , Disp: , Rfl:     insulin glargine (LANTUS SOLOSTAR) 100 units/mL injection pen, Inject 22 Units under the skin daily (Patient taking differently: Inject 24 Units under the skin daily at bedtime ), Disp: 5 pen, Rfl: 5    Insulin Pen Needle 31G X 5 MM MISC, by Does not apply route 2 (two) times a day for 50 days, Disp: 100 each, Rfl: 0    magnesium hydroxide (MILK OF MAGNESIA) 400 mg/5 mL oral suspension, Take 30 mL by mouth daily as needed for constipation, Disp: , Rfl:     Melatonin 5 MG TABS, TAKE ONE TABLET BY MOUTH EVERY NIGHT AT BEDTIME, Disp: 30 tablet, Rfl: 2    metFORMIN (GLUCOPHAGE) 1000 MG tablet, TAKE ONE TABLET BY MOUTH TWO TIMES A DAY (Patient taking differently: Take 1,000 mg by mouth daily at bedtime ), Disp: 60 tablet, Rfl: 2    metoprolol tartrate (LOPRESSOR) 25 mg tablet, Take 0 5 tablets (12 5 mg total) by mouth every 12 (twelve) hours, Disp: 30 tablet, Rfl: 0    multivitamin-minerals therapeutic (THERA-M), , Disp: , Rfl:     NOVOLOG FLEXPEN 100 units/mL SOPN, INJ 5U BEFORE MEALS AND PER SS <250=NO CALL 250-300=5U,301-350= 10U,351-400=15U,401-450=20U>451 CALL FOR ORDERS UP TO 4X PER DAY, Disp: 15 mL, Rfl: 5    ondansetron (ZOFRAN) 4 mg tablet, ondansetron HCl 4 mg tablet  TAKE 1 TABLET BY MOUTH EVERY 8 HOURS AS NEEDED, Disp: , Rfl:     torsemide (DEMADEX) 10 mg tablet, TAKE 2 TABLETS BY MOUTH TWO TIMES A DAY, Disp: 120 tablet, Rfl: 2    warfarin (COUMADIN) 1 mg tablet, Take 1 mg by mouth daily at bedtime 2 mg on Wed and Sun, Disp: , Rfl:     warfarin (COUMADIN) 6 mg tablet, , Disp: , Rfl:   Family History   Problem Relation Age of Onset    Other Mother         CARDIAC DISORDER  Diabetes Mother     Cancer Father     Other Family         CARDIAC DISORDER     Diabetes Family     Cancer Family     Mental illness Family     Kidney disease Sister     Diabetes Sister               Coordination of Care: Radu Soto of the treatment plan    Patient / Staff education : Patient / Staff was given education on sign of infection and pressure ulcer prevention  Patient/ Staff verbalized understanding     Follow up :  Return in about 2 weeks (around 9/13/2021)  Voice-recognition software may have been used in the preparation of this document  Occasional wrong word or "sound-alike" substitutions may have occurred due to the inherent limitations of voice recognition software  Interpretation should be guided by context        NICOLA Quinones

## 2021-08-31 ENCOUNTER — HOSPITAL ENCOUNTER (OUTPATIENT)
Dept: INFUSION CENTER | Facility: HOSPITAL | Age: 71
Discharge: HOME | End: 2021-08-31
Attending: INTERNAL MEDICINE
Payer: COMMERCIAL

## 2021-08-31 VITALS
WEIGHT: 309 LBS | HEART RATE: 66 BPM | SYSTOLIC BLOOD PRESSURE: 132 MMHG | BODY MASS INDEX: 38.42 KG/M2 | TEMPERATURE: 97.5 F | DIASTOLIC BLOOD PRESSURE: 60 MMHG | HEIGHT: 75 IN | RESPIRATION RATE: 16 BRPM | OXYGEN SATURATION: 98 %

## 2021-08-31 DIAGNOSIS — C90.00 MULTIPLE MYELOMA NOT HAVING ACHIEVED REMISSION (HCC): Primary | ICD-10-CM

## 2021-08-31 PROCEDURE — 96401 CHEMO ANTI-NEOPL SQ/IM: CPT

## 2021-08-31 RX ORDER — LIRAGLUTIDE 6 MG/ML
1.8 INJECTION SUBCUTANEOUS DAILY
COMMUNITY

## 2021-08-31 RX ORDER — DEXAMETHASONE 4 MG/1
20 TABLET ORAL ONCE
Status: COMPLETED | OUTPATIENT
Start: 2021-08-31 | End: 2021-08-31

## 2021-08-31 RX ADMIN — BORTEZOMIB 3.5 MG: 3.5 INJECTION, POWDER, LYOPHILIZED, FOR SOLUTION INTRAVENOUS; SUBCUTANEOUS at 11:43

## 2021-08-31 RX ADMIN — DEXAMETHASONE 20 MG: 4 TABLET ORAL at 11:16

## 2021-08-31 NOTE — PLAN OF CARE
Problem: Potential for Falls  Goal: Patient will remain free of falls  Description: INTERVENTIONS:  - Educate patient/family on patient safety including physical limitations  - Instruct patient to call for assistance with activity   - Consult OT/PT to assist with strengthening/mobility   - Keep Call bell within reach  - Keep bed low and locked with side rails adjusted as appropriate  - Keep care items and personal belongings within reach  - Initiate and maintain comfort rounds  - Make Fall Risk Sign visible to staff  - Offer Toileting every  Hours, in advance of need  - Initiate/Maintain alarm  - Obtain necessary fall risk management equipment:   - Consider moving patient to room near nurses station  Outcome: Progressing

## 2021-08-31 NOTE — PROGRESS NOTES
Pt here for Velcade; given with no adv reactions; bandaid dry and intact; AVS given; pt left unit in [place] to

## 2021-09-02 LAB — INR PPP: 3.1 (ref 0.84–1.19)

## 2021-09-03 ENCOUNTER — OFFICE VISIT (OUTPATIENT)
Dept: HEMATOLOGY ONCOLOGY | Facility: HOSPITAL | Age: 71
End: 2021-09-03
Payer: COMMERCIAL

## 2021-09-03 ENCOUNTER — ANTICOAG VISIT (OUTPATIENT)
Dept: CARDIOLOGY CLINIC | Facility: CLINIC | Age: 71
End: 2021-09-03

## 2021-09-03 VITALS
OXYGEN SATURATION: 97 % | TEMPERATURE: 97.6 F | RESPIRATION RATE: 16 BRPM | DIASTOLIC BLOOD PRESSURE: 68 MMHG | SYSTOLIC BLOOD PRESSURE: 132 MMHG | HEART RATE: 81 BPM

## 2021-09-03 DIAGNOSIS — C90.00 MULTIPLE MYELOMA NOT HAVING ACHIEVED REMISSION (HCC): Primary | ICD-10-CM

## 2021-09-03 DIAGNOSIS — I48.20 CHRONIC ATRIAL FIBRILLATION (HCC): Primary | ICD-10-CM

## 2021-09-03 DIAGNOSIS — C90.00 MULTIPLE MYELOMA NOT HAVING ACHIEVED REMISSION (HCC): Primary | Chronic | ICD-10-CM

## 2021-09-03 PROCEDURE — 99214 OFFICE O/P EST MOD 30 MIN: CPT | Performed by: NURSE PRACTITIONER

## 2021-09-03 PROCEDURE — 1036F TOBACCO NON-USER: CPT | Performed by: NURSE PRACTITIONER

## 2021-09-03 NOTE — PROGRESS NOTES
Tc to [address] [place] [address], spoke with sabine, will fax to hold warfarin x 1 day, then decreased dose, 6 mg mon/fri, 8 mg other days of the week, inr due 10 days  Faxed to [phone] and pharmacy

## 2021-09-03 NOTE — PROGRESS NOTES
Hematology/Oncology Outpatient Follow- up Note  Jenny Lung 1950, [id number]  9/3/2021        Chief Complaint   Patient presents with    Follow-up       HPI:  Radha Zendejas a 82 yo male with multiple comorbidities including diabetes and hypertension with bone marrow biopsy proven multiple myeloma  He was referred to us in 8/2019 after 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  He was noted to have elevated kidney function and anemia which prompted work up for multiple myeloma  His skeletal survey was negative  He underwent bone marrow biopsy confirming multiple myeloma  He was initiated on treatment with with Velcade and Decadron weekly on a 35 day schedule   Velcade is 1 3 milligrams/meter squared and Decadron is 20 mg p o  On days 1, 8, 15, 22      Previous Hematologic/ Oncologic History:    Oncology History   Multiple myeloma ([facility] )   9/16/2019 Initial Diagnosis    Multiple myeloma not having achieved remission ([facility] )     9/24/2019 -  Chemotherapy    iron sucrose (VENOFER), 200 mg (100 % of original dose 200 mg), Intravenous, Once, 1 of 1 cycle  Dose modification: 200 mg (original dose 200 mg, Cycle 1, Reason: Other (Must fill in a comment))  Administration: 200 mg (9/24/2019)  bortezomib (VELCADE), 1 3 mg/m2 = 3 5 mg (81 3 % of original dose 1 6 mg/m2), Subcutaneous, Once, 18 of 23 cycles  Dose modification: 1 3 mg/m2 (original dose 1 6 mg/m2, Cycle 1, Reason: Dose Not Tolerated)  Administration: 3 5 mg (9/24/2019), 3 5 mg (10/1/2019), 3 5 mg (10/8/2019), 3 5 mg (10/15/2019), 3 5 mg (1/6/2020), 3 5 mg (1/13/2020), 3 5 mg (1/20/2020), 3 5 mg (1/27/2020), 3 5 mg (2/11/2020), 3 5 mg (2/17/2020), 3 5 mg (2/24/2020), 3 5 mg (3/2/2020), 3 5 mg (3/16/2020), 3 5 mg (3/23/2020), 3 5 mg (3/30/2020), 3 5 mg (4/6/2020), 3 5 mg (4/20/2020), 3 5 mg (4/27/2020), 3 5 mg (5/4/2020), 3 5 mg (5/11/2020), 3 5 mg (5/26/2020), 3 5 mg (6/1/2020), 3 5 mg (6/8/2020), 3 5 mg (6/15/2020), 3 5 mg (2020), 3 4 mg (2020), 3 4 mg (2020), 3 5 mg (2020), 3 4 mg (2020), 3 4 mg (2020), 3 4 mg (2020), 3 4 mg (10/5/2020), 3 4 mg (2020), 3 4 mg (2020), 3 4 mg (2020), 3 4 mg (2020), 3 4 mg (2020), 3 4 mg (2020), 3 4 mg (2020), 3 4 mg (2021), 3 4 mg (2021), 3 4 mg (2021), 3 4 mg (2/3/2021), 3 4 mg (2021), 3 4 mg (2/15/2021), 3 4 mg (2021), 3 4 mg (3/9/2021), 3 4 mg (3/16/2021), 3 4 mg (3/23/2021), 3 4 mg (3/30/2021), 3 4 mg (2021), 3 4 mg (2021), 3 4 mg (2021), 3 4 mg (2021), 3 5 mg (2021), 3 5 mg (2021), 3 5 mg (2021), 3 5 mg (2021), 3 5 mg (2021), 3 5 mg (2021), 3 5 mg (2021), 3 5 mg (2021), 3 5 mg (2021), 3 5 mg (8/3/2021), 3 5 mg (8/10/2021), 3 5 mg (2021), 3 5 mg (2021)         Current Hematologic/ Oncologic Treatment:    Velcade and Decadron    ECO - Symptomatic, <50% confined to bed    Interval History:   The patient presents for routine follow up  He was last seen on   Most recent blood work completed on  was reviewed  Calcium 8 7  BUN 41, creatinine 1 28  Alk Phos 124  CBC acceptable range  Hemoglobin 10 7  White count 9 4, platelets 432  No recent Myeloma labs, even though these were ordered  Myeloma labs from  are stable  No M spike  Free light chain ratio normal    He started cycle 18 on   He is scheduled for day 8 on   Overall, patient states he is feeling good, he is at his baseline  Appetite remains good  No N/V  Occasional constipation  Denies any bony pain  Cancer Staging:  Cancer Staging  No matching staging information was found for the patient  Molecular Testing:         Test Results:    Imaging: No results found      Labs:   Lab Results   Component Value Date    WBC 6 43 2020    HGB 12 6 2020    HCT 41 5 2020    MCV 87 2020     2020     Lab Results   Component Value Date     01/15/2018    K 4 1 11/02/2020     11/02/2020    CO2 32 11/02/2020    ANIONGAP 11 12/22/2015    BUN 22 11/02/2020    CREATININE 1 19 11/02/2020    GLUCOSE 195 (H) 08/30/2020    GLUF 141 (H) 02/26/2019    CALCIUM 8 8 11/02/2020    CORRECTEDCA 9 4 11/02/2020    AST 20 11/02/2020    ALT 16 11/02/2020    ALKPHOS 172 (H) 11/02/2020    PROT 7 1 01/15/2018    BILITOT 0 6 01/15/2018    EGFR 62 11/02/2020         Review of Systems   Gastrointestinal: Positive for constipation  Musculoskeletal: Positive for gait problem  All other systems reviewed and are negative          Active Problems:   Patient Active Problem List   Diagnosis    Diabetic foot ulcer ([place] [address] )    Diabetes mellitus type 2, uncontrolled ([place] [address] )    Chronic venous hypertension, right    Essential hypertension    Chronic atrial fibrillation (HCC)    Morbid obesity (HCC)    Chronic diastolic congestive heart failure (HCC)    Cardiomyopathy (HCC)    Diabetes, polyneuropathy ([facility] )    GERD (gastroesophageal reflux disease)    Hyperlipidemia    Onychomycosis    Gallstones    Localized edema    Peripheral vascular disease (HCC)    SOB (shortness of breath)    NALLELY (obstructive sleep apnea)    Moderate persistent asthma without complication    Multiple myeloma (HCC)    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)    Hiatal hernia    Weakness    Type 2 acute myocardial infarction (HCC)    Chronic obstructive pulmonary disease, unspecified ([facility] )    Hypertensive chronic kidney disease w stg 1-4/unsp chr kdny    Mass of psoas muscle    CKD (chronic kidney disease)    Chronic diastolic (congestive) heart failure (HCC)    Lymphedema    Rash    Difficulty in walking, not elsewhere classified    Gout    Venous insufficiency (chronic) (peripheral)    Sepsis due to COVID-19 pneumonia ([facility] )    Fall from bed    Need for influenza vaccination    Venous hypertension, chronic, with ulcer and inflammation, right ([facility] )  Traumatic skin ulcer ([place] [address] )    Ambulatory dysfunction       Past Medical History:   Past Medical History:   Diagnosis Date    Cancer [facility])     CH[facility]congestive heart failure) [facility]     Chronic kidney disease     COPD (chronic obstructive pulmonary disease) ([place] [address] )     COVID-19     Decubitus ulcer of heel     LAST ASSESSED [id number]    Diabetes mellitus (HCC)     History of varicose veins     Hypertension     Intermittent claudication (HCC)     Neuropathy     Seasonal allergies        Surgical History:   Past Surgical History:   Procedure Laterality Date    AMPUTATION ABOVE KNEE (AKA) Left 7/31/2019    Procedure: AMPUTATION ABOVE KNEE (AKA); Surgeon: Kavita Johnson MD;  Location: [facility];  Service: General    ANGIOPLASTY      W/ FLUOROSC ANGIOGRAPGY PERIPHERAL ARTERY ADDITIONAL  LAST ASSESSED [id number]    ANGIOPLASTY / STENTING FEMORAL      TANSCATH INTRAVASCULAR STENT PLACEMENT PERCUTANEOUS FEMORAL     COLONOSCOPY  2010    CT BONE MARROW BIOPSY AND ASPIRATION  8/9/2019    FULL THICKNESS SKIN GRAFT      TENDON REPAIR      TOE AMPUTATION Left 12/27/2018    Procedure: AMPUTATION left 4th TOE;  Surgeon: Margareth Chang DPM;  Location: [facility];  Service: Podiatry       Family History:    Family History   Problem Relation Age of Onset    Other Mother         CARDIAC DISORDER     Diabetes Mother     Cancer Father     Other Family         CARDIAC DISORDER     Diabetes Family     Cancer Family     Mental illness Family     Kidney disease Sister     Diabetes Sister        Cancer-related family history includes Cancer in his family and father      Social History:   Social History     Socioeconomic History    Marital status:      Spouse name: Not on file    Number of children: 1    Years of education: Not on file    Highest education level: Not on file   Occupational History    Occupation: RETIRED   Tobacco Use    Smoking status: Never Smoker    Smokeless tobacco: Never Used   Vaping Use    Vaping Use: Never used   Substance and Sexual Activity    Alcohol use: Not Currently    Drug use: Not Currently    Sexual activity: Not Currently   Other Topics Concern    Not on file   Social History Narrative    DENIED [address]    FEELS SAFE AT [facility] - SISTER    NO LIVING WILL    POA IN EXISTENCE     SUPPORTIVE AND SAFE    One son, 2 granddaughters     Social Determinants of Health     Financial Resource Strain:     Difficulty of Paying Living Expenses:    Food Insecurity:     Worried About Running Out of Food in the Last Year:     Ran Out of Food in the Last Year:    Transportation Needs:     Lack of Transportation (Medical):      Lack of Transportation (Non-Medical):    Physical Activity:     Days of Exercise per Week:     Minutes of Exercise per Session:    Stress:     Feeling of Stress :    Social Connections:     Frequency of Communication with Friends and Family:     Frequency of Social Gatherings with Friends and Family:     Attends [employer]ze Services:     Active Member of Clubs or Organizations:     Attends Club or Organization Meetings:     Marital Status:    Intimate Partner Violence:     Fear of Current or Ex-Partner:     Emotionally Abused:     Physically Abused:     Sexually Abused:        Current Medications:   Current Outpatient Medications   Medication Sig Dispense Refill    acetaminophen (TYLENOL) 500 mg tablet Take 1 tablet (500 mg total) by mouth every 6 (six) hours as needed for mild pain, headaches or fever 90 tablet 1    albuterol (PROVENTIL HFA,VENTOLIN HFA) 90 mcg/act inhaler Inhale 2 puffs every 6 (six) hours as needed for wheezing 1 Inhaler 0    Alcohol Swabs (ALCOHOL PREP) 70 % PADS   0    allopurinol (ZYLOPRIM) 300 mg tablet TAKE ONE TABLET BY MOUTH DAILY (Patient taking differently: 100 mg ) 30 tablet 5    ammonium lactate (LAC-HYDRIN) 12 % lotion APPLY TO BLE TOPICALLY DAILY 400 g 2    atorvastatin (LIPITOR) 40 mg tablet Take 1 tablet (40 mg total) by mouth daily after dinner 30 tablet 0    BD AUTOSHIELD DUO 30G X 5 MM MISC USE AS DIRECTED WITH INSULIN FOUR TIMES A  each 3    BD INSULIN SYRINGE U/F 31G X 5/16" 0 5 ML MISC USE AS DIRECTED WITH NOVOLIN 70/30  0    BD PEN NEEDLE HILARIO U/F 32G X 4 MM MISC by Other route 3 (three) times a day 300 each 1    bisacodyl (bisacodyl) 5 mg EC tablet Take 5 mg by mouth daily as needed for constipation      bisacodyl (DULCOLAX) 10 mg suppository Insert 10 mg into the rectum as needed for constipation      bortezomib (VELCADE) Infuse into a venous catheter once      clotrimazole (LOTRIMIN) 1 % cream APPLY TO BUTTOCK 2 TIMES DAILY 45 g 3    Colcrys 0 6 MG tablet       Easy Touch Safety Lancets 28G MISC USE 4 TIMES DAILY TO TEST BLOOD SUGAR 100 each 5    fluticasone-salmeterol (ADVAIR DISKUS, WIXELA INHUB) 250-50 mcg/dose inhaler Inhale 1 puff 2 (two) times a day Rinse mouth after use   1 Inhaler 5    Glutose 15 40 %       insulin glargine (LANTUS SOLOSTAR) 100 units/mL injection pen Inject 22 Units under the skin daily (Patient taking differently: Inject 24 Units under the skin daily at bedtime ) 5 pen 5    Insulin Pen Needle 31G X 5 MM MISC by Does not apply route 2 (two) times a day for 50 days 100 each 0    liraglutide (Victoza) injection Inject 1 8 mg under the skin daily      magnesium hydroxide (MILK OF MAGNESIA) 400 mg/5 mL oral suspension Take 30 mL by mouth daily as needed for constipation      Melatonin 5 MG TABS TAKE ONE TABLET BY MOUTH EVERY NIGHT AT BEDTIME 30 tablet 2    metFORMIN (GLUCOPHAGE) 1000 MG tablet TAKE ONE TABLET BY MOUTH TWO TIMES A DAY (Patient taking differently: Take 1,000 mg by mouth daily at bedtime ) 60 tablet 2    metoprolol tartrate (LOPRESSOR) 25 mg tablet Take 0 5 tablets (12 5 mg total) by mouth every 12 (twelve) hours 30 tablet 0    multivitamin-minerals therapeutic (THERA-M)       NOVOLOG FLEXPEN 100 units/mL SOPN INJ 5U BEFORE MEALS AND PER SS <250=NO CALL 250-300=5U,301-350= 10U,351-400=15U,401-450=20U>451 CALL FOR ORDERS UP TO 4X PER DAY 15 mL 5    ondansetron (ZOFRAN) 4 mg tablet ondansetron HCl 4 mg tablet   TAKE 1 TABLET BY MOUTH EVERY 8 HOURS AS NEEDED      torsemide (DEMADEX) 10 mg tablet TAKE 2 TABLETS BY MOUTH TWO TIMES A  tablet 2    warfarin (COUMADIN) 1 mg tablet Take 1 mg by mouth daily at bedtime 2 mg on Wed and Sun      warfarin (COUMADIN) 6 mg tablet        No current facility-administered medications for this visit  Allergies: Allergies   Allergen Reactions    Latex Rash       Physical Exam:  /68 (BP Location: Left arm)   Pulse 81   Temp 97 6 °F (36 4 °C) (Temporal)   Resp 16   SpO2 97%   There is no height or weight on file to calculate BSA  Wt Readings from Last 3 Encounters:   08/31/21 (!) 140 kg (309 lb)   08/17/21 (!) 140 kg (309 lb)   08/10/21 (!) 142 kg (314 lb)           Physical Exam  Constitutional:       General: He is not in acute distress  Appearance: He is obese  He is not toxic-appearing  HENT:      Head: Normocephalic and atraumatic  Eyes:      General: No scleral icterus  Right eye: No discharge  Left eye: No discharge  Conjunctiva/sclera: Conjunctivae normal    Cardiovascular:      Rate and Rhythm: Normal rate and regular rhythm  Heart sounds: Normal heart sounds  Pulmonary:      Effort: Pulmonary effort is normal       Breath sounds: Normal breath sounds  Abdominal:      General: Bowel sounds are normal       Palpations: Abdomen is soft  Musculoskeletal:      Cervical back: Normal range of motion  Comments: Left BKA   Skin:     General: Skin is warm and dry  Neurological:      General: No focal deficit present  Mental Status: He is alert and oriented to person, place, and time     Psychiatric:         Mood and Affect: Mood normal          Behavior: Behavior normal          Assessment / Plan:    1  Multiple myeloma not having achieved remission [facility])    The patient is a very pleasant 68 yo male with multiple comorbidities including diabetes and hypertension with bone marrow biopsy proven multiple myeloma  He was referred to us in 8/2019 after 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  He was noted to have elevated kidney function and anemia which prompted work up for multiple myeloma  His skeletal survey was negative  He underwent bone marrow biopsy confirming multiple myeloma  He was initiated on treatment with with Velcade and Decadron weekly on a 35 day schedule  Velcade is 1 3 milligrams/meter squared and Decadron is 20 mg p o  On days 1, 8, 15, 22  Skeletal survey from 11/2020 showed no evidence of lytic bony lesions  He continues to tolerate his treatment well  His CBC and CMP are in acceptable range  He has not had any myeloma labs done since June, these were stable  I have requested these to be done again today and he will be provided with hard copy of lab requisition slip so hopefully his facility will draw them  He will continue with current regimen without change  He will return for follow up in 2 months with repeat blood work  He will have labs done prior to treatments as planned  Patient was in agreement with plan of care  He was instructed to call with any questions or concerns prior to his next office visit         Goals and Barriers:  Current Goal:  Prolong Survival from multiple myeloma   Barriers: None  Patient's Capacity to Self Care:  Patient able to self care  Portions of the record may have been created with voice recognition software  Occasional wrong word or "sound a like" substitutions may have occurred due to the inherent limitations of voice recognition software  Read the chart carefully and recognize, using context, where substitutions have occurred

## 2021-09-07 ENCOUNTER — HOSPITAL ENCOUNTER (OUTPATIENT)
Dept: INFUSION CENTER | Facility: HOSPITAL | Age: 71
Discharge: HOME | End: 2021-09-07
Attending: INTERNAL MEDICINE
Payer: COMMERCIAL

## 2021-09-07 VITALS
TEMPERATURE: 97.3 F | HEART RATE: 79 BPM | WEIGHT: 314 LBS | BODY MASS INDEX: 39.04 KG/M2 | SYSTOLIC BLOOD PRESSURE: 134 MMHG | DIASTOLIC BLOOD PRESSURE: 72 MMHG | RESPIRATION RATE: 16 BRPM | OXYGEN SATURATION: 98 % | HEIGHT: 75 IN

## 2021-09-07 DIAGNOSIS — C90.00 MULTIPLE MYELOMA NOT HAVING ACHIEVED REMISSION (HCC): Primary | ICD-10-CM

## 2021-09-07 PROCEDURE — 96401 CHEMO ANTI-NEOPL SQ/IM: CPT

## 2021-09-07 RX ORDER — DEXAMETHASONE 4 MG/1
20 TABLET ORAL ONCE
Status: COMPLETED | OUTPATIENT
Start: 2021-09-07 | End: 2021-09-07

## 2021-09-07 RX ADMIN — BORTEZOMIB 3.5 MG: 3.5 INJECTION, POWDER, LYOPHILIZED, FOR SOLUTION INTRAVENOUS; SUBCUTANEOUS at 13:40

## 2021-09-07 RX ADMIN — DEXAMETHASONE 20 MG: 4 TABLET ORAL at 13:09

## 2021-09-13 ENCOUNTER — TELEPHONE (OUTPATIENT)
Dept: CARDIOLOGY CLINIC | Facility: CLINIC | Age: 71
End: 2021-09-13

## 2021-09-13 ENCOUNTER — IN HOME VISIT (OUTPATIENT)
Dept: WOUND CARE | Facility: HOSPITAL | Age: 71
End: 2021-09-13
Payer: COMMERCIAL

## 2021-09-13 DIAGNOSIS — L98.499 TRAUMATIC SKIN ULCER, UNSPECIFIED ULCER STAGE (HCC): Primary | ICD-10-CM

## 2021-09-13 DIAGNOSIS — I48.20 CHRONIC ATRIAL FIBRILLATION (HCC): Primary | ICD-10-CM

## 2021-09-13 PROCEDURE — 99334 PR DOM/R-HOME E/M EST PT SELF-LMTD/MINOR 15 MINUTES: CPT | Performed by: NURSE PRACTITIONER

## 2021-09-13 NOTE — PROGRESS NOTES
[address] MANAGEMENT   AND HYPERBARIC MEDICINE CENTER       Patient ID: Javi To is a 78 y o  male Date of Birth 1950     Location of Service: [facility]    Performed wound round with: [facility], RN      Chief Complaint   Patient presents with    Follow Up Wound Care Visit     Right middle and 4th finger       Wound Instructions:  Orders Placed This Encounter   Procedures    Wound cleansing and dressings     Right middle finger and 4th finger  - healed  - discontinue wound treatment  - sign off     Standing Status:   Future     Standing Expiration Date:   9/13/2022       Allergies  Latex      Assessment & Plan:  1  Traumatic skin ulcer, unspecified ulcer stage ([facility] [address] )  Assessment & Plan:  Right finger - 3rd finger and 4th finger  - healed  - sign off    Orders:  -     Wound cleansing and dressings; Future           Subjective:   8/30/2021 This is a 30-year-old male referred to our service for wound on the right middle finger and right index  As per patient, the wound started last week  He was opening that cabinet drawer and he accidentally bumped his fingers  As per patient the wound started as a blister and opened up  Severity, no sign of infection  Current treatment is triad paste and the wound is improving  Patient denies pain  9/13/21 Follow up for wound on the middle finger and right index  No significant issues related to the wound  Review of Systems   Constitutional: Negative  HENT: Negative  Eyes: Negative  Respiratory: Negative  Cardiovascular: Negative  Gastrointestinal: Negative  Endocrine: Negative  Genitourinary: Negative  Musculoskeletal: Positive for gait problem  Skin: Positive for wound  See HPI   Hematological: Negative  Psychiatric/Behavioral: Negative  All other systems reviewed and are negative  Objective: There were no vitals taken for this visit      Physical Exam  Constitutional:       Appearance: Normal appearance  HENT:      Head: Normocephalic and atraumatic  Nose: Nose normal       Mouth/Throat:      Mouth: Mucous membranes are moist    Eyes:      Conjunctiva/sclera: Conjunctivae normal    Pulmonary:      Effort: Pulmonary effort is normal    Abdominal:      Tenderness: There is no abdominal tenderness  Musculoskeletal:      Cervical back: Normal range of motion  Comments: LROM   Skin:     Findings: Lesion present  Comments: Location Right index wound bed - healed  Location Right middle finger wound bed -healed     Neurological:      Mental Status: He is alert  Gait: Gait abnormal    Psychiatric:         Mood and Affect: Mood normal          Behavior: Behavior normal               Procedures             Patient's care was coordinated with nursing facility staff  Recent vitals, labs and updated medications were reviewed on EMR or chart system of facility   Past Medical, surgical, social, medication and allergy history and patient's previous records were reviewed and updated as appropriate:     Patient Active Problem List   Diagnosis    Diabetic foot ulcer ([facility] )    Diabetes mellitus type 2, uncontrolled ([place] [address] )    Chronic venous hypertension, right    Essential hypertension    [facility] fibrillation ([facility] )    Morbid obesity ([facility] )    Chronic diastolic congestive heart failure ([facility] )    Cardiomyopathy ([facility] )    Diabetes, polyneuropathy ([facility] )    GERD (gastroesophageal reflux disease)    Hyperlipidemia    Onychomycosis    Gallstones    Localized edema    Peripheral vascular disease (HCC)    SOB (shortness of breath)    NALLELY (obstructive sleep apnea)    Moderate persistent asthma without complication    Multiple myeloma ([facility] )    Above knee amputation of left lower extremity ([facility] )    Hiatal hernia    Weakness    Type 2 acute myocardial infarction ([facility] )    Chronic obstructive pulmonary disease, unspecified ([facility] )    Hypertensive chronic kidney disease w stg 1-4/unsp chr kdny    Mass of psoas muscle    CKD (chronic kidney disease)    Chronic diastolic (congestive) heart failure (HCC)    Lymphedema    Rash    Difficulty in walking, not elsewhere classified    Gout    Venous insufficiency (chronic) (peripheral)    Sepsis due to COVID-19 pneumonia ([place] [address] )    Fall from bed    Need for influenza vaccination    Venous hypertension, chronic, with ulcer and inflammation, right (HCC)    Traumatic skin ulcer ([place] [address] )    Ambulatory dysfunction     Past Medical History:   Diagnosis Date    Cancer [facility])     CH[facility]congestive [facility] failure) [facility]     Chronic kidney disease     COPD (chronic obstructive pulmonary disease) ([place] [address] )     COVID-19     Decubitus ulcer of heel     LAST ASSESSED [id number]    Diabetes mellitus ([place] [address] )     History of varicose veins     Hypertension     Intermittent claudication ([facility])     Neuropathy     Seasonal allergies      Past Surgical History:   Procedure Laterality Date    AMPUTATION ABOVE KNEE (AKA) Left 7/31/2019    Procedure: AMPUTATION ABOVE KNEE (AKA);   Surgeon: Jose Izquierdo MD;  Location: [facility];  Service: General    ANGIOPLASTY      W/ FLUOROSC ANGIOGRAPGY PERIPHERAL ARTERY ADDITIONAL  LAST ASSESSED [id number]    ANGIOPLASTY / STENTING FEMORAL      TANSCATH INTRAVASCULAR STENT PLACEMENT PERCUTANEOUS FEMORAL     COLONOSCOPY  2010    CT BONE MARROW BIOPSY AND ASPIRATION  8/9/2019    FULL THICKNESS SKIN GRAFT      TENDON REPAIR      TOE AMPUTATION Left 12/27/2018    Procedure: AMPUTATION left 4th TOE;  Surgeon: Luis Miguel Moore DPM;  [facility];  Service: Podiatry     Social History     Socioeconomic History    Marital status:      Spouse name: None    Number of children: 1    Years of education: None    Highest education level: None   Occupational History    Occupation: RETIRED   Tobacco Use    Smoking status: Never Smoker    Smokeless tobacco: Never Used   Vaping Use    Vaping Use: Never used Substance and Sexual Activity    Alcohol use: Not Currently    Drug use: Not Currently    Sexual activity: Not Currently   Other Topics Concern    None   Social History Narrative    DENIED ACTIVE ADVANCED DIRECTIVE    ALWAYS USES [employer]    CAFFEINE USE    [employer]    FEELS SAFE AT HOME    LIVES WITH FAMILY - SISTER    NO LIVING WILL    POA IN EXISTENCE     SUPPORTIVE AND SAFE    One son, 2 granddaughters     Social Determinants of Health     Financial Resource Strain:     Difficulty of Paying Living Expenses:    Food Insecurity:     Worried About Running Out of Food in the Last Year:     Ran Out of Food in the Last Year:    Transportation Needs:     Lack of Transportation (Medical):      Lack of Transportation (Non-Medical):    Physical Activity:     Days of Exercise per Week:     Minutes of Exercise per Session:    Stress:     Feeling of Stress :    Social Connections:     Frequency of Communication with Friends and Family:     Frequency of Social Gatherings with Friends and Family:     Attends Temple Services:     Active Member of Clubs or Organizations:     Attends Club or Organization Meetings:     Marital Status:    Intimate Partner Violence:     Fear of Current or Ex-Partner:     Emotionally Abused:     Physically Abused:     Sexually Abused:         Current Outpatient Medications:     acetaminophen (TYLENOL) 500 mg tablet, Take 1 tablet (500 mg total) by mouth every 6 (six) hours as needed for mild pain, headaches or fever, Disp: 90 tablet, Rfl: 1    albuterol (PROVENTIL HFA,VENTOLIN HFA) 90 mcg/act inhaler, Inhale 2 puffs every 6 (six) hours as needed for wheezing, Disp: 1 Inhaler, Rfl: 0    Alcohol Swabs (ALCOHOL PREP) 70 % PADS, , Disp: , Rfl: 0    allopurinol (ZYLOPRIM) 300 mg tablet, TAKE ONE TABLET BY MOUTH DAILY (Patient taking differently: 100 mg ), Disp: 30 tablet, Rfl: 5    ammonium lactate (LAC-HYDRIN) 12 % lotion, APPLY TO BLE TOPICALLY DAILY, Disp: 400 g, Rfl: 2    atorvastatin (LIPITOR) 40 mg tablet, Take 1 tablet (40 mg total) by mouth daily after dinner, Disp: 30 tablet, Rfl: 0    BD AUTOSHIELD DUO 30G X 5 MM MISC, USE AS DIRECTED WITH INSULIN FOUR TIMES A DAY, Disp: 100 each, Rfl: 3    BD INSULIN SYRINGE U/F 31G X 5/16" 0 5 ML MISC, USE AS DIRECTED WITH NOVOLIN 70/30, Disp: , Rfl: 0    BD PEN NEEDLE HILARIO U/F 32G X 4 MM MISC, by Other route 3 (three) times a day, Disp: 300 each, Rfl: 1    bisacodyl (bisacodyl) 5 mg EC tablet, Take 5 mg by mouth daily as needed for constipation, Disp: , Rfl:     bisacodyl (DULCOLAX) 10 mg suppository, Insert 10 mg into the rectum as needed for constipation, Disp: , Rfl:     bortezomib (VELCADE), Infuse into a venous catheter once, Disp: , Rfl:     clotrimazole (LOTRIMIN) 1 % cream, APPLY TO BUTTOCK 2 TIMES DAILY, Disp: 45 g, Rfl: 3    Colcrys 0 6 MG tablet, , Disp: , Rfl:     Easy Touch Safety Lancets 28G MISC, USE 4 TIMES DAILY TO TEST BLOOD SUGAR, Disp: 100 each, Rfl: 5    fluticasone-salmeterol (ADVAIR DISKUS, WIXELA INHUB) 250-50 mcg/dose inhaler, Inhale 1 puff 2 (two) times a day Rinse mouth after use , Disp: 1 Inhaler, Rfl: 5    Glutose 15 40 %, , Disp: , Rfl:     insulin glargine (LANTUS SOLOSTAR) 100 units/mL injection pen, Inject 22 Units under the skin daily (Patient taking differently: Inject 24 Units under the skin daily at bedtime ), Disp: 5 pen, Rfl: 5    Insulin Pen Needle 31G X 5 MM MISC, by Does not apply route 2 (two) times a day for 50 days, Disp: 100 each, Rfl: 0    liraglutide (Victoza) injection, Inject 1 8 mg under the skin daily, Disp: , Rfl:     magnesium hydroxide (MILK OF MAGNESIA) 400 mg/5 mL oral suspension, Take 30 mL by mouth daily as needed for constipation, Disp: , Rfl:     Melatonin 5 MG TABS, TAKE ONE TABLET BY MOUTH EVERY NIGHT AT BEDTIME, Disp: 30 tablet, Rfl: 2    metFORMIN (GLUCOPHAGE) 1000 MG tablet, TAKE ONE TABLET BY MOUTH TWO TIMES A DAY (Patient taking differently: Take 1,000 mg by mouth daily at bedtime ), Disp: 60 tablet, Rfl: 2    metoprolol tartrate (LOPRESSOR) 25 mg tablet, Take 0 5 tablets (12 5 mg total) by mouth every 12 (twelve) hours, Disp: 30 tablet, Rfl: 0    multivitamin-minerals therapeutic (THERA-M), , Disp: , Rfl:     NOVOLOG FLEXPEN 100 units/mL SOPN, INJ 5U BEFORE MEALS AND PER SS <250=NO CALL 250-300=5U,301-350= 10U,351-400=15U,401-450=20U>451 CALL FOR ORDERS UP TO 4X PER DAY, Disp: 15 mL, Rfl: 5    ondansetron (ZOFRAN) 4 mg tablet, ondansetron HCl 4 mg tablet  TAKE 1 TABLET BY MOUTH EVERY 8 HOURS AS NEEDED, Disp: , Rfl:     torsemide (DEMADEX) 10 mg tablet, TAKE 2 TABLETS BY MOUTH TWO TIMES A DAY, Disp: 120 tablet, Rfl: 2    warfarin (COUMADIN) 1 mg tablet, Take 1 mg by mouth daily at bedtime 2 mg on Wed and Sun, Disp: , Rfl:     warfarin (COUMADIN) 6 mg tablet, , Disp: , Rfl:   Family History   Problem Relation Age of Onset    Other Mother         CARDIAC DISORDER     Diabetes Mother     Cancer Father     Other Family         CARDIAC DISORDER     Diabetes Family     Cancer Family     Mental illness Family     Kidney disease Sister     Diabetes Sister               Coordination of Care: Angeles Marquis of the treatment plan    Patient / Staff education : Patient / Staff was given education on sign of infection and pressure ulcer prevention  Patient/ Staff verbalized understanding     Follow up :  Return if symptoms worsen or fail to improve  Voice-recognition software may have been used in the preparation of this document  Occasional wrong word or "sound-alike" substitutions may have occurred due to the inherent limitations of voice recognition software  Interpretation should be guided by context        NICOLA Agustin

## 2021-09-13 NOTE — LETTER
Patient:  Shay Martinez   1950           NICOLA Trejo saw Shay Martinez for a wound care visit on 9/13/2021  See below for information relating to this visit  Chief Complaint   Patient presents with    Follow Up Wound Care Visit     Right middle and 4th finger        Assessment/Plan:  1  Traumatic skin ulcer, unspecified ulcer stage ([address] )  Assessment & Plan:  Right finger - 3rd finger and 4th finger  - healed  - sign off    Orders:  -     Wound cleansing and dressings; Future           Orders:  Shay Martinez  1950  Orders Placed This Encounter   Procedures    Wound cleansing and dressings     Right middle finger and 4th finger  - healed  - discontinue wound treatment  - sign off     Standing Status:   Future     Standing Expiration Date:   9/13/2022         Follow Up:  Return if symptoms worsen or fail to improve  [address] [facility] hours are 8:00 am - 4:30 pm Monday through Friday  The center phone number is [phone]  You can also contact me directly thru my email at [facility]  [email or web address]  [email or web address] or thru tiger text  If it is an emergency, please contact the PCP or patient's attending physician in your facility       Sincerely,    Electronically signed by NICOLA Trejo    Patient : Shay Martinez    1950

## 2021-09-13 NOTE — PATIENT INSTRUCTIONS
Orders Placed This Encounter   Procedures    Wound cleansing and dressings     Right middle finger and 4th finger  - healed  - discontinue wound treatment  - sign off     Standing Status:   Future     Standing Expiration Date:   9/13/2022

## 2021-09-14 ENCOUNTER — LAB (OUTPATIENT)
Dept: LAB | Facility: HOSPITAL | Age: 71
End: 2021-09-14
Attending: INTERNAL MEDICINE
Payer: COMMERCIAL

## 2021-09-14 ENCOUNTER — ANTICOAG VISIT (OUTPATIENT)
Dept: CARDIOLOGY CLINIC | Facility: CLINIC | Age: 71
End: 2021-09-14

## 2021-09-14 ENCOUNTER — HOSPITAL ENCOUNTER (OUTPATIENT)
Dept: INFUSION CENTER | Facility: HOSPITAL | Age: 71
Discharge: HOME | End: 2021-09-14
Attending: INTERNAL MEDICINE
Payer: COMMERCIAL

## 2021-09-14 VITALS
TEMPERATURE: 97.3 F | WEIGHT: 308 LBS | SYSTOLIC BLOOD PRESSURE: 126 MMHG | OXYGEN SATURATION: 97 % | RESPIRATION RATE: 16 BRPM | DIASTOLIC BLOOD PRESSURE: 68 MMHG | BODY MASS INDEX: 38.3 KG/M2 | HEART RATE: 61 BPM | HEIGHT: 75 IN

## 2021-09-14 DIAGNOSIS — I48.20 CHRONIC ATRIAL FIBRILLATION (HCC): Primary | ICD-10-CM

## 2021-09-14 DIAGNOSIS — C90.00 MULTIPLE MYELOMA NOT HAVING ACHIEVED REMISSION (HCC): Primary | ICD-10-CM

## 2021-09-14 PROCEDURE — 96401 CHEMO ANTI-NEOPL SQ/IM: CPT

## 2021-09-14 RX ORDER — DEXAMETHASONE 4 MG/1
20 TABLET ORAL ONCE
Status: COMPLETED | OUTPATIENT
Start: 2021-09-14 | End: 2021-09-14

## 2021-09-14 RX ADMIN — DEXAMETHASONE 20 MG: 4 TABLET ORAL at 11:08

## 2021-09-14 RX ADMIN — BORTEZOMIB 3.5 MG: 3.5 INJECTION, POWDER, LYOPHILIZED, FOR SOLUTION INTRAVENOUS; SUBCUTANEOUS at 11:51

## 2021-09-14 NOTE — PROGRESS NOTES
Pt here for Velcade; injection given with no adv reactions; bandaid dry and intact; AVS given; pt left unit in wc to transport

## 2021-09-16 ENCOUNTER — APPOINTMENT (OUTPATIENT)
Dept: LAB | Facility: CLINIC | Age: 71
End: 2021-09-16
Payer: COMMERCIAL

## 2021-09-16 DIAGNOSIS — C90.00 MULTIPLE MYELOMA NOT HAVING ACHIEVED REMISSION (HCC): ICD-10-CM

## 2021-09-16 LAB
ALBUMIN SERPL BCP-MCNC: 3.2 G/DL (ref 3.5–5)
ALP SERPL-CCNC: 121 U/L (ref 46–116)
ALT SERPL W P-5'-P-CCNC: 29 U/L (ref 12–78)
ANION GAP SERPL CALCULATED.3IONS-SCNC: 8 MMOL/L (ref 4–13)
ARTIFACT: PRESENT
AST SERPL W P-5'-P-CCNC: 20 U/L (ref 5–45)
BASOPHILS # BLD MANUAL: 0 THOUSAND/UL (ref 0–0.1)
BASOPHILS NFR MAR MANUAL: 0 % (ref 0–1)
BILIRUB SERPL-MCNC: 0.76 MG/DL (ref 0.2–1)
BUN SERPL-MCNC: 55 MG/DL (ref 5–25)
CALCIUM ALBUM COR SERPL-MCNC: 9.6 MG/DL (ref 8.3–10.1)
CALCIUM SERPL-MCNC: 9 MG/DL (ref 8.3–10.1)
CHLORIDE SERPL-SCNC: 106 MMOL/L (ref 100–108)
CO2 SERPL-SCNC: 25 MMOL/L (ref 21–32)
CREAT SERPL-MCNC: 1.42 MG/DL (ref 0.6–1.3)
EOSINOPHIL # BLD MANUAL: 0.29 THOUSAND/UL (ref 0–0.4)
EOSINOPHIL NFR BLD MANUAL: 3 % (ref 0–6)
ERYTHROCYTE [DISTWIDTH] IN BLOOD BY AUTOMATED COUNT: 16 % (ref 11.6–15.1)
GFR SERPL CREATININE-BSD FRML MDRD: 50 ML/MIN/1.73SQ M
GLUCOSE SERPL-MCNC: 271 MG/DL (ref 65–140)
HCT VFR BLD AUTO: 39.3 % (ref 36.5–49.3)
HGB BLD-MCNC: 11.8 G/DL (ref 12–17)
IGA SERPL-MCNC: 221 MG/DL (ref 70–400)
IGG SERPL-MCNC: 661 MG/DL (ref 700–1600)
IGM SERPL-MCNC: 30 MG/DL (ref 40–230)
LYMPHOCYTES # BLD AUTO: 1.82 THOUSAND/UL (ref 0.6–4.47)
LYMPHOCYTES # BLD AUTO: 19 % (ref 14–44)
MCH RBC QN AUTO: 26.1 PG (ref 26.8–34.3)
MCHC RBC AUTO-ENTMCNC: 30 G/DL (ref 31.4–37.4)
MCV RBC AUTO: 87 FL (ref 82–98)
MONOCYTES # BLD AUTO: 0.1 THOUSAND/UL (ref 0–1.22)
MONOCYTES NFR BLD: 1 % (ref 4–12)
NEUTROPHILS # BLD MANUAL: 7.39 THOUSAND/UL (ref 1.85–7.62)
NEUTS SEG NFR BLD AUTO: 77 % (ref 43–75)
PLATELET # BLD AUTO: 171 THOUSANDS/UL (ref 149–390)
PLATELET BLD QL SMEAR: ADEQUATE
PMV BLD AUTO: 11.3 FL (ref 8.9–12.7)
POTASSIUM SERPL-SCNC: 3.8 MMOL/L (ref 3.5–5.3)
PROT SERPL-MCNC: 6.9 G/DL (ref 6.4–8.2)
RBC # BLD AUTO: 4.52 MILLION/UL (ref 3.88–5.62)
RBC MORPH BLD: NORMAL
SODIUM SERPL-SCNC: 139 MMOL/L (ref 136–145)
WBC # BLD AUTO: 9.6 THOUSAND/UL (ref 4.31–10.16)

## 2021-09-16 PROCEDURE — 86334 IMMUNOFIX E-PHORESIS SERUM: CPT | Performed by: PATHOLOGY

## 2021-09-16 PROCEDURE — 86334 IMMUNOFIX E-PHORESIS SERUM: CPT

## 2021-09-16 PROCEDURE — 83883 ASSAY NEPHELOMETRY NOT SPEC: CPT

## 2021-09-16 PROCEDURE — 36415 COLL VENOUS BLD VENIPUNCTURE: CPT

## 2021-09-16 PROCEDURE — 80053 COMPREHEN METABOLIC PANEL: CPT

## 2021-09-16 PROCEDURE — 82232 ASSAY OF BETA-2 PROTEIN: CPT

## 2021-09-16 PROCEDURE — 84165 PROTEIN E-PHORESIS SERUM: CPT

## 2021-09-16 PROCEDURE — 84165 PROTEIN E-PHORESIS SERUM: CPT | Performed by: PATHOLOGY

## 2021-09-16 PROCEDURE — 85027 COMPLETE CBC AUTOMATED: CPT

## 2021-09-16 PROCEDURE — 85007 BL SMEAR W/DIFF WBC COUNT: CPT

## 2021-09-16 PROCEDURE — 82784 ASSAY IGA/IGD/IGG/IGM EACH: CPT

## 2021-09-17 LAB
KAPPA LC FREE SER-MCNC: 28.3 MG/L (ref 3.3–19.4)
KAPPA LC FREE/LAMBDA FREE SER: 1.51 {RATIO} (ref 0.26–1.65)
LAMBDA LC FREE SERPL-MCNC: 18.7 MG/L (ref 5.7–26.3)

## 2021-09-18 LAB
ALBUMIN SERPL ELPH-MCNC: 3.6 G/DL (ref 3.5–5)
ALBUMIN SERPL ELPH-MCNC: 57.2 % (ref 52–65)
ALPHA1 GLOB SERPL ELPH-MCNC: 0.35 G/DL (ref 0.1–0.4)
ALPHA1 GLOB SERPL ELPH-MCNC: 5.5 % (ref 2.5–5)
ALPHA2 GLOB SERPL ELPH-MCNC: 0.96 G/DL (ref 0.4–1.2)
ALPHA2 GLOB SERPL ELPH-MCNC: 15.3 % (ref 7–13)
B2 MICROGLOB SERPL-MCNC: 4 MG/L (ref 0.6–2.4)
BETA GLOB ABNORMAL SERPL ELPH-MCNC: 0.43 G/DL (ref 0.4–0.8)
BETA1 GLOB SERPL ELPH-MCNC: 6.9 % (ref 5–13)
BETA2 GLOB SERPL ELPH-MCNC: 5.8 % (ref 2–8)
BETA2+GAMMA GLOB SERPL ELPH-MCNC: 0.37 G/DL (ref 0.2–0.5)
GAMMA GLOB ABNORMAL SERPL ELPH-MCNC: 0.59 G/DL (ref 0.5–1.6)
GAMMA GLOB SERPL ELPH-MCNC: 9.3 % (ref 12–22)
IGG/ALB SER: 1.34 {RATIO} (ref 1.1–1.8)
INTERPRETATION UR IFE-IMP: NORMAL
PROT PATTERN SERPL ELPH-IMP: ABNORMAL
PROT SERPL-MCNC: 6.3 G/DL (ref 6.4–8.2)

## 2021-09-21 ENCOUNTER — HOSPITAL ENCOUNTER (OUTPATIENT)
Dept: INFUSION CENTER | Facility: HOSPITAL | Age: 71
Discharge: HOME | End: 2021-09-21
Attending: INTERNAL MEDICINE
Payer: COMMERCIAL

## 2021-09-21 VITALS
WEIGHT: 308 LBS | SYSTOLIC BLOOD PRESSURE: 146 MMHG | BODY MASS INDEX: 38.3 KG/M2 | DIASTOLIC BLOOD PRESSURE: 67 MMHG | HEIGHT: 75 IN | TEMPERATURE: 97.4 F | RESPIRATION RATE: 18 BRPM | OXYGEN SATURATION: 98 % | HEART RATE: 62 BPM

## 2021-09-21 DIAGNOSIS — C90.00 MULTIPLE MYELOMA NOT HAVING ACHIEVED REMISSION (HCC): Primary | ICD-10-CM

## 2021-09-21 PROCEDURE — 96401 CHEMO ANTI-NEOPL SQ/IM: CPT

## 2021-09-21 RX ORDER — DEXAMETHASONE 4 MG/1
20 TABLET ORAL ONCE
Status: COMPLETED | OUTPATIENT
Start: 2021-09-21 | End: 2021-09-21

## 2021-09-21 RX ADMIN — BORTEZOMIB 3.5 MG: 3.5 INJECTION, POWDER, LYOPHILIZED, FOR SOLUTION INTRAVENOUS; SUBCUTANEOUS at 12:04

## 2021-09-21 RX ADMIN — DEXAMETHASONE 20 MG: 4 TABLET ORAL at 11:30

## 2021-09-21 NOTE — PROGRESS NOTES
Pt tolerated chemo injection in left abdomen  Bandaid intact  Pt remained in wc  Escorted off unit to meet [facility] transport  AVS printed and given to pt

## 2021-09-28 RX ORDER — DEXAMETHASONE 4 MG/1
20 TABLET ORAL ONCE
Status: CANCELLED
Start: 2021-10-12 | End: 2021-10-12

## 2021-09-28 RX ORDER — DEXAMETHASONE 4 MG/1
20 TABLET ORAL ONCE
Status: CANCELLED
Start: 2021-10-19 | End: 2021-10-19

## 2021-09-28 RX ORDER — DEXAMETHASONE 4 MG/1
20 TABLET ORAL ONCE
Status: CANCELLED
Start: 2021-10-05 | End: 2021-10-05

## 2021-09-28 RX ORDER — DEXAMETHASONE 4 MG/1
20 TABLET ORAL ONCE
Status: CANCELLED
Start: 2021-10-26 | End: 2021-10-26

## 2021-09-29 ENCOUNTER — APPOINTMENT (OUTPATIENT)
Dept: LAB | Facility: CLINIC | Age: 71
End: 2021-09-29
Payer: COMMERCIAL

## 2021-09-29 ENCOUNTER — ANTICOAG VISIT (OUTPATIENT)
Dept: CARDIOLOGY CLINIC | Facility: CLINIC | Age: 71
End: 2021-09-29

## 2021-09-29 DIAGNOSIS — C90.00 MULTIPLE MYELOMA NOT HAVING ACHIEVED REMISSION (HCC): ICD-10-CM

## 2021-09-29 DIAGNOSIS — I48.20 CHRONIC ATRIAL FIBRILLATION (HCC): Primary | ICD-10-CM

## 2021-09-29 LAB
ALBUMIN SERPL BCP-MCNC: 2.5 G/DL (ref 3.5–5)
ALP SERPL-CCNC: 132 U/L (ref 46–116)
ALT SERPL W P-5'-P-CCNC: 16 U/L (ref 12–78)
ANION GAP SERPL CALCULATED.3IONS-SCNC: 5 MMOL/L (ref 4–13)
AST SERPL W P-5'-P-CCNC: 11 U/L (ref 5–45)
BASOPHILS # BLD AUTO: 0.03 THOUSANDS/ΜL (ref 0–0.1)
BASOPHILS NFR BLD AUTO: 0 % (ref 0–1)
BILIRUB SERPL-MCNC: 0.51 MG/DL (ref 0.2–1)
BUN SERPL-MCNC: 24 MG/DL (ref 5–25)
CALCIUM ALBUM COR SERPL-MCNC: 9.5 MG/DL (ref 8.3–10.1)
CALCIUM SERPL-MCNC: 8.3 MG/DL (ref 8.3–10.1)
CHLORIDE SERPL-SCNC: 110 MMOL/L (ref 100–108)
CO2 SERPL-SCNC: 25 MMOL/L (ref 21–32)
CREAT SERPL-MCNC: 1.13 MG/DL (ref 0.6–1.3)
EOSINOPHIL # BLD AUTO: 0.26 THOUSAND/ΜL (ref 0–0.61)
EOSINOPHIL NFR BLD AUTO: 3 % (ref 0–6)
ERYTHROCYTE [DISTWIDTH] IN BLOOD BY AUTOMATED COUNT: 15.9 % (ref 11.6–15.1)
GFR SERPL CREATININE-BSD FRML MDRD: 66 ML/MIN/1.73SQ M
GLUCOSE P FAST SERPL-MCNC: 150 MG/DL (ref 65–99)
HCT VFR BLD AUTO: 31.9 % (ref 36.5–49.3)
HGB BLD-MCNC: 9.6 G/DL (ref 12–17)
IMM GRANULOCYTES # BLD AUTO: 0.2 THOUSAND/UL (ref 0–0.2)
IMM GRANULOCYTES NFR BLD AUTO: 2 % (ref 0–2)
LYMPHOCYTES # BLD AUTO: 1.3 THOUSANDS/ΜL (ref 0.6–4.47)
LYMPHOCYTES NFR BLD AUTO: 13 % (ref 14–44)
MCH RBC QN AUTO: 26.3 PG (ref 26.8–34.3)
MCHC RBC AUTO-ENTMCNC: 30.1 G/DL (ref 31.4–37.4)
MCV RBC AUTO: 87 FL (ref 82–98)
MONOCYTES # BLD AUTO: 0.56 THOUSAND/ΜL (ref 0.17–1.22)
MONOCYTES NFR BLD AUTO: 6 % (ref 4–12)
NEUTROPHILS # BLD AUTO: 7.71 THOUSANDS/ΜL (ref 1.85–7.62)
NEUTS SEG NFR BLD AUTO: 76 % (ref 43–75)
NRBC BLD AUTO-RTO: 0 /100 WBCS
PLATELET # BLD AUTO: 305 THOUSANDS/UL (ref 149–390)
PMV BLD AUTO: 10.6 FL (ref 8.9–12.7)
POTASSIUM SERPL-SCNC: 3.8 MMOL/L (ref 3.5–5.3)
PROT SERPL-MCNC: 6.7 G/DL (ref 6.4–8.2)
RBC # BLD AUTO: 3.65 MILLION/UL (ref 3.88–5.62)
SODIUM SERPL-SCNC: 140 MMOL/L (ref 136–145)
WBC # BLD AUTO: 10.06 THOUSAND/UL (ref 4.31–10.16)

## 2021-09-29 PROCEDURE — 80053 COMPREHEN METABOLIC PANEL: CPT

## 2021-09-29 PROCEDURE — 85025 COMPLETE CBC W/AUTO DIFF WBC: CPT

## 2021-09-29 NOTE — PROGRESS NOTES
Tc to [id number], left message on answering machine for nurse, will fax to hold warfarin x 2 days, (wed/thurs), starting  Fri, 10/1, take 6 mg fri/sat/sun, inr due Monday  Faxed to [facility] and pharmacy

## 2021-10-04 ENCOUNTER — APPOINTMENT (OUTPATIENT)
Dept: LAB | Facility: CLINIC | Age: 71
DRG: 291 | End: 2021-10-04
Payer: COMMERCIAL

## 2021-10-04 ENCOUNTER — APPOINTMENT (EMERGENCY)
Dept: RADIOLOGY | Facility: HOSPITAL | Age: 71
DRG: 291 | End: 2021-10-04
Payer: COMMERCIAL

## 2021-10-04 ENCOUNTER — APPOINTMENT (EMERGENCY)
Dept: NON INVASIVE DIAGNOSTICS | Facility: HOSPITAL | Age: 71
DRG: 291 | End: 2021-10-04
Payer: COMMERCIAL

## 2021-10-04 ENCOUNTER — HOSPITAL ENCOUNTER (INPATIENT)
Facility: HOSPITAL | Age: 71
LOS: 12 days | Discharge: HOME HEALTH | DRG: 291 | End: 2021-10-16
Attending: EMERGENCY MEDICINE | Admitting: HOSPITALIST
Payer: COMMERCIAL

## 2021-10-04 ENCOUNTER — ANTICOAG VISIT (OUTPATIENT)
Dept: CARDIOLOGY CLINIC | Facility: CLINIC | Age: 71
End: 2021-10-04

## 2021-10-04 DIAGNOSIS — I42.9 CARDIOMYOPATHY, UNSPECIFIED TYPE (HCC): ICD-10-CM

## 2021-10-04 DIAGNOSIS — R60.9 SWELLING: ICD-10-CM

## 2021-10-04 DIAGNOSIS — J69.0 ASPIRATION PNEUMONIA (HCC): Primary | ICD-10-CM

## 2021-10-04 DIAGNOSIS — N17.9 AKI (ACUTE KIDNEY INJURY) (HCC): ICD-10-CM

## 2021-10-04 DIAGNOSIS — E11.641 UNCONTROLLED TYPE 2 DIABETES MELLITUS WITH HYPOGLYCEMIA AND COMA (HCC): Chronic | ICD-10-CM

## 2021-10-04 DIAGNOSIS — I50.33 ACUTE ON CHRONIC DIASTOLIC CONGESTIVE HEART FAILURE (HCC): ICD-10-CM

## 2021-10-04 DIAGNOSIS — I48.20 CHRONIC ATRIAL FIBRILLATION (HCC): Primary | ICD-10-CM

## 2021-10-04 PROBLEM — D64.9 ANEMIA: Status: ACTIVE | Noted: 2021-10-04

## 2021-10-04 PROBLEM — R79.1 SUPRATHERAPEUTIC INR: Status: ACTIVE | Noted: 2021-10-04

## 2021-10-04 LAB
ALBUMIN SERPL BCP-MCNC: 2.3 G/DL (ref 3.5–5)
ALP SERPL-CCNC: 143 U/L (ref 46–116)
ALT SERPL W P-5'-P-CCNC: 17 U/L (ref 12–78)
ANION GAP SERPL CALCULATED.3IONS-SCNC: 5 MMOL/L (ref 4–13)
APTT PPP: 84 SECONDS (ref 23–37)
AST SERPL W P-5'-P-CCNC: 16 U/L (ref 5–45)
ATRIAL RATE: 80 BPM
BASOPHILS # BLD AUTO: 0.04 THOUSANDS/ΜL (ref 0–0.1)
BASOPHILS NFR BLD AUTO: 1 % (ref 0–1)
BILIRUB SERPL-MCNC: 0.62 MG/DL (ref 0.2–1)
BILIRUB UR QL STRIP: NEGATIVE
BUN SERPL-MCNC: 22 MG/DL (ref 5–25)
CALCIUM ALBUM COR SERPL-MCNC: 10.1 MG/DL (ref 8.3–10.1)
CALCIUM SERPL-MCNC: 8.7 MG/DL (ref 8.3–10.1)
CHLORIDE SERPL-SCNC: 109 MMOL/L (ref 100–108)
CLARITY UR: CLEAR
CO2 SERPL-SCNC: 25 MMOL/L (ref 21–32)
COLOR UR: YELLOW
CREAT SERPL-MCNC: 1.19 MG/DL (ref 0.6–1.3)
EOSINOPHIL # BLD AUTO: 0.16 THOUSAND/ΜL (ref 0–0.61)
EOSINOPHIL NFR BLD AUTO: 2 % (ref 0–6)
ERYTHROCYTE [DISTWIDTH] IN BLOOD BY AUTOMATED COUNT: 16.3 % (ref 11.6–15.1)
FERRITIN SERPL-MCNC: 131 NG/ML (ref 8–388)
GFR SERPL CREATININE-BSD FRML MDRD: 62 ML/MIN/1.73SQ M
GLUCOSE SERPL-MCNC: 122 MG/DL (ref 65–140)
GLUCOSE SERPL-MCNC: 127 MG/DL (ref 65–140)
GLUCOSE SERPL-MCNC: 138 MG/DL (ref 65–140)
GLUCOSE UR STRIP-MCNC: NEGATIVE MG/DL
HCT VFR BLD AUTO: 29.6 % (ref 36.5–49.3)
HGB BLD-MCNC: 9 G/DL (ref 12–17)
HGB UR QL STRIP.AUTO: NEGATIVE
IMM GRANULOCYTES # BLD AUTO: 0.09 THOUSAND/UL (ref 0–0.2)
IMM GRANULOCYTES NFR BLD AUTO: 1 % (ref 0–2)
INR PPP: 4.27 (ref 0.84–1.19)
IRON SATN MFR SERPL: 10 % (ref 20–50)
IRON SERPL-MCNC: 26 UG/DL (ref 65–175)
KETONES UR STRIP-MCNC: NEGATIVE MG/DL
LACTATE SERPL-SCNC: 1.3 MMOL/L (ref 0.5–2)
LACTATE SERPL-SCNC: 2.7 MMOL/L (ref 0.5–2)
LEUKOCYTE ESTERASE UR QL STRIP: NEGATIVE
LYMPHOCYTES # BLD AUTO: 1.42 THOUSANDS/ΜL (ref 0.6–4.47)
LYMPHOCYTES NFR BLD AUTO: 18 % (ref 14–44)
MCH RBC QN AUTO: 26.2 PG (ref 26.8–34.3)
MCHC RBC AUTO-ENTMCNC: 30.4 G/DL (ref 31.4–37.4)
MCV RBC AUTO: 86 FL (ref 82–98)
MONOCYTES # BLD AUTO: 0.65 THOUSAND/ΜL (ref 0.17–1.22)
MONOCYTES NFR BLD AUTO: 8 % (ref 4–12)
NEUTROPHILS # BLD AUTO: 5.71 THOUSANDS/ΜL (ref 1.85–7.62)
NEUTS SEG NFR BLD AUTO: 70 % (ref 43–75)
NITRITE UR QL STRIP: NEGATIVE
NRBC BLD AUTO-RTO: 0 /100 WBCS
NT-PROBNP SERPL-MCNC: 3289 PG/ML
PH UR STRIP.AUTO: 5 [PH]
PLATELET # BLD AUTO: 396 THOUSANDS/UL (ref 149–390)
PMV BLD AUTO: 10 FL (ref 8.9–12.7)
POTASSIUM SERPL-SCNC: 3.8 MMOL/L (ref 3.5–5.3)
PROCALCITONIN SERPL-MCNC: <0.05 NG/ML
PROT SERPL-MCNC: 6.7 G/DL (ref 6.4–8.2)
PROT UR STRIP-MCNC: NEGATIVE MG/DL
PROTHROMBIN TIME: 38.8 SECONDS (ref 11.6–14.5)
QRS AXIS: 58 DEGREES
QRSD INTERVAL: 124 MS
QT INTERVAL: 424 MS
QTC INTERVAL: 479 MS
RBC # BLD AUTO: 3.43 MILLION/UL (ref 3.88–5.62)
SODIUM SERPL-SCNC: 139 MMOL/L (ref 136–145)
SP GR UR STRIP.AUTO: 1.01 (ref 1–1.03)
T WAVE AXIS: 20 DEGREES
TIBC SERPL-MCNC: 250 UG/DL (ref 250–450)
UROBILINOGEN UR QL STRIP.AUTO: 1 E.U./DL
VENTRICULAR RATE: 77 BPM
WBC # BLD AUTO: 8.07 THOUSAND/UL (ref 4.31–10.16)

## 2021-10-04 PROCEDURE — 85610 PROTHROMBIN TIME: CPT

## 2021-10-04 PROCEDURE — 93010 ELECTROCARDIOGRAM REPORT: CPT | Performed by: INTERNAL MEDICINE

## 2021-10-04 PROCEDURE — 82948 REAGENT STRIP/BLOOD GLUCOSE: CPT

## 2021-10-04 PROCEDURE — 71275 CT ANGIOGRAPHY CHEST: CPT

## 2021-10-04 PROCEDURE — 81003 URINALYSIS AUTO W/O SCOPE: CPT

## 2021-10-04 PROCEDURE — 93005 ELECTROCARDIOGRAM TRACING: CPT

## 2021-10-04 PROCEDURE — 99285 EMERGENCY DEPT VISIT HI MDM: CPT

## 2021-10-04 PROCEDURE — 85730 THROMBOPLASTIN TIME PARTIAL: CPT

## 2021-10-04 PROCEDURE — 84145 PROCALCITONIN (PCT): CPT

## 2021-10-04 PROCEDURE — 99223 1ST HOSP IP/OBS HIGH 75: CPT | Performed by: HOSPITALIST

## 2021-10-04 PROCEDURE — 82728 ASSAY OF FERRITIN: CPT | Performed by: HOSPITALIST

## 2021-10-04 PROCEDURE — 83550 IRON BINDING TEST: CPT | Performed by: HOSPITALIST

## 2021-10-04 PROCEDURE — 83605 ASSAY OF LACTIC ACID: CPT

## 2021-10-04 PROCEDURE — 83540 ASSAY OF IRON: CPT | Performed by: HOSPITALIST

## 2021-10-04 PROCEDURE — 93971 EXTREMITY STUDY: CPT | Performed by: SURGERY

## 2021-10-04 PROCEDURE — 87040 BLOOD CULTURE FOR BACTERIA: CPT

## 2021-10-04 PROCEDURE — 83880 ASSAY OF NATRIURETIC PEPTIDE: CPT | Performed by: HOSPITALIST

## 2021-10-04 PROCEDURE — 99285 EMERGENCY DEPT VISIT HI MDM: CPT | Performed by: EMERGENCY MEDICINE

## 2021-10-04 PROCEDURE — 85025 COMPLETE CBC W/AUTO DIFF WBC: CPT

## 2021-10-04 PROCEDURE — 3066F NEPHROPATHY DOC TX: CPT | Performed by: NURSE PRACTITIONER

## 2021-10-04 PROCEDURE — 74177 CT ABD & PELVIS W/CONTRAST: CPT

## 2021-10-04 PROCEDURE — 80053 COMPREHEN METABOLIC PANEL: CPT

## 2021-10-04 PROCEDURE — 93971 EXTREMITY STUDY: CPT

## 2021-10-04 PROCEDURE — 96365 THER/PROPH/DIAG IV INF INIT: CPT

## 2021-10-04 RX ORDER — COLCHICINE 0.6 MG/1
1.2 TABLET ORAL ONCE
Status: COMPLETED | OUTPATIENT
Start: 2021-10-04 | End: 2021-10-04

## 2021-10-04 RX ORDER — FLUTICASONE FUROATE AND VILANTEROL 100; 25 UG/1; UG/1
1 POWDER RESPIRATORY (INHALATION)
Status: DISCONTINUED | OUTPATIENT
Start: 2021-10-05 | End: 2021-10-04

## 2021-10-04 RX ORDER — LANOLIN ALCOHOL/MO/W.PET/CERES
6 CREAM (GRAM) TOPICAL
Status: DISCONTINUED | OUTPATIENT
Start: 2021-10-04 | End: 2021-10-16 | Stop reason: HOSPADM

## 2021-10-04 RX ORDER — FLUTICASONE FUROATE AND VILANTEROL 200; 25 UG/1; UG/1
1 POWDER RESPIRATORY (INHALATION) DAILY
Status: DISCONTINUED | OUTPATIENT
Start: 2021-10-05 | End: 2021-10-16 | Stop reason: HOSPADM

## 2021-10-04 RX ORDER — TORSEMIDE 20 MG/1
20 TABLET ORAL 2 TIMES DAILY
Status: DISCONTINUED | OUTPATIENT
Start: 2021-10-05 | End: 2021-10-05

## 2021-10-04 RX ORDER — ALLOPURINOL 100 MG/1
100 TABLET ORAL DAILY
Status: DISCONTINUED | OUTPATIENT
Start: 2021-10-05 | End: 2021-10-16 | Stop reason: HOSPADM

## 2021-10-04 RX ORDER — FUROSEMIDE 10 MG/ML
40 INJECTION INTRAMUSCULAR; INTRAVENOUS ONCE
Status: COMPLETED | OUTPATIENT
Start: 2021-10-04 | End: 2021-10-04

## 2021-10-04 RX ORDER — ONDANSETRON 4 MG/1
4 TABLET, ORALLY DISINTEGRATING ORAL EVERY 8 HOURS PRN
Status: DISCONTINUED | OUTPATIENT
Start: 2021-10-04 | End: 2021-10-16 | Stop reason: HOSPADM

## 2021-10-04 RX ORDER — ALBUTEROL SULFATE 90 UG/1
2 AEROSOL, METERED RESPIRATORY (INHALATION) EVERY 6 HOURS PRN
Status: DISCONTINUED | OUTPATIENT
Start: 2021-10-04 | End: 2021-10-16 | Stop reason: HOSPADM

## 2021-10-04 RX ORDER — ACETAMINOPHEN 325 MG/1
488 TABLET ORAL EVERY 6 HOURS PRN
Status: DISCONTINUED | OUTPATIENT
Start: 2021-10-04 | End: 2021-10-16 | Stop reason: HOSPADM

## 2021-10-04 RX ORDER — INSULIN GLARGINE 100 [IU]/ML
22 INJECTION, SOLUTION SUBCUTANEOUS
Status: DISCONTINUED | OUTPATIENT
Start: 2021-10-04 | End: 2021-10-16 | Stop reason: HOSPADM

## 2021-10-04 RX ORDER — ATORVASTATIN CALCIUM 40 MG/1
40 TABLET, FILM COATED ORAL
Status: DISCONTINUED | OUTPATIENT
Start: 2021-10-04 | End: 2021-10-16 | Stop reason: HOSPADM

## 2021-10-04 RX ORDER — AMMONIUM LACTATE 12 G/100G
LOTION TOPICAL 2 TIMES DAILY
Status: DISCONTINUED | OUTPATIENT
Start: 2021-10-04 | End: 2021-10-16 | Stop reason: HOSPADM

## 2021-10-04 RX ADMIN — IOHEXOL 100 ML: 350 INJECTION, SOLUTION INTRAVENOUS at 17:12

## 2021-10-04 RX ADMIN — ATORVASTATIN CALCIUM 40 MG: 40 TABLET, FILM COATED ORAL at 22:22

## 2021-10-04 RX ADMIN — Medication 12.5 MG: at 22:22

## 2021-10-04 RX ADMIN — INSULIN GLARGINE 22 UNITS: 100 INJECTION, SOLUTION SUBCUTANEOUS at 22:21

## 2021-10-04 RX ADMIN — FUROSEMIDE 40 MG: 10 INJECTION, SOLUTION INTRAMUSCULAR; INTRAVENOUS at 22:23

## 2021-10-04 RX ADMIN — COLCHICINE 1.2 MG: 0.6 TABLET ORAL at 22:21

## 2021-10-04 RX ADMIN — SODIUM CHLORIDE 3 G: 9 INJECTION, SOLUTION INTRAVENOUS at 18:55

## 2021-10-04 RX ADMIN — Medication: at 22:24

## 2021-10-05 ENCOUNTER — APPOINTMENT (INPATIENT)
Dept: NON INVASIVE DIAGNOSTICS | Facility: HOSPITAL | Age: 71
DRG: 291 | End: 2021-10-05
Payer: COMMERCIAL

## 2021-10-05 ENCOUNTER — HOSPITAL ENCOUNTER (OUTPATIENT)
Dept: INFUSION CENTER | Facility: HOSPITAL | Age: 71
Discharge: HOME | End: 2021-10-05
Attending: INTERNAL MEDICINE

## 2021-10-05 LAB
ANION GAP SERPL CALCULATED.3IONS-SCNC: 6 MMOL/L (ref 4–13)
BASOPHILS # BLD AUTO: 0.04 THOUSANDS/ΜL (ref 0–0.1)
BASOPHILS NFR BLD AUTO: 1 % (ref 0–1)
BUN SERPL-MCNC: 20 MG/DL (ref 5–25)
CALCIUM SERPL-MCNC: 8.7 MG/DL (ref 8.3–10.1)
CHLORIDE SERPL-SCNC: 111 MMOL/L (ref 100–108)
CO2 SERPL-SCNC: 25 MMOL/L (ref 21–32)
CREAT SERPL-MCNC: 1.18 MG/DL (ref 0.6–1.3)
EOSINOPHIL # BLD AUTO: 0.23 THOUSAND/ΜL (ref 0–0.61)
EOSINOPHIL NFR BLD AUTO: 4 % (ref 0–6)
ERYTHROCYTE [DISTWIDTH] IN BLOOD BY AUTOMATED COUNT: 16.1 % (ref 11.6–15.1)
GFR SERPL CREATININE-BSD FRML MDRD: 63 ML/MIN/1.73SQ M
GLUCOSE SERPL-MCNC: 112 MG/DL (ref 65–140)
GLUCOSE SERPL-MCNC: 117 MG/DL (ref 65–140)
GLUCOSE SERPL-MCNC: 134 MG/DL (ref 65–140)
GLUCOSE SERPL-MCNC: 144 MG/DL (ref 65–140)
GLUCOSE SERPL-MCNC: 159 MG/DL (ref 65–140)
HCT VFR BLD AUTO: 28.9 % (ref 36.5–49.3)
HGB BLD-MCNC: 8.9 G/DL (ref 12–17)
IMM GRANULOCYTES # BLD AUTO: 0.06 THOUSAND/UL (ref 0–0.2)
IMM GRANULOCYTES NFR BLD AUTO: 1 % (ref 0–2)
INR PPP: 3.37 (ref 0.84–1.19)
LYMPHOCYTES # BLD AUTO: 0.99 THOUSANDS/ΜL (ref 0.6–4.47)
LYMPHOCYTES NFR BLD AUTO: 16 % (ref 14–44)
MCH RBC QN AUTO: 26.5 PG (ref 26.8–34.3)
MCHC RBC AUTO-ENTMCNC: 30.8 G/DL (ref 31.4–37.4)
MCV RBC AUTO: 86 FL (ref 82–98)
MONOCYTES # BLD AUTO: 0.52 THOUSAND/ΜL (ref 0.17–1.22)
MONOCYTES NFR BLD AUTO: 9 % (ref 4–12)
NEUTROPHILS # BLD AUTO: 4.24 THOUSANDS/ΜL (ref 1.85–7.62)
NEUTS SEG NFR BLD AUTO: 69 % (ref 43–75)
NRBC BLD AUTO-RTO: 0 /100 WBCS
PLATELET # BLD AUTO: 379 THOUSANDS/UL (ref 149–390)
PMV BLD AUTO: 9.6 FL (ref 8.9–12.7)
POTASSIUM SERPL-SCNC: 3.8 MMOL/L (ref 3.5–5.3)
PROCALCITONIN SERPL-MCNC: 0.08 NG/ML
PROTHROMBIN TIME: 32.4 SECONDS (ref 11.6–14.5)
RBC # BLD AUTO: 3.36 MILLION/UL (ref 3.88–5.62)
SODIUM SERPL-SCNC: 142 MMOL/L (ref 136–145)
WBC # BLD AUTO: 6.08 THOUSAND/UL (ref 4.31–10.16)

## 2021-10-05 PROCEDURE — 97163 PT EVAL HIGH COMPLEX 45 MIN: CPT

## 2021-10-05 PROCEDURE — 85025 COMPLETE CBC W/AUTO DIFF WBC: CPT | Performed by: HOSPITALIST

## 2021-10-05 PROCEDURE — C8929 TTE W OR WO FOL WCON,DOPPLER: HCPCS

## 2021-10-05 PROCEDURE — 85610 PROTHROMBIN TIME: CPT | Performed by: HOSPITALIST

## 2021-10-05 PROCEDURE — 99232 SBSQ HOSP IP/OBS MODERATE 35: CPT | Performed by: PHYSICIAN ASSISTANT

## 2021-10-05 PROCEDURE — 80048 BASIC METABOLIC PNL TOTAL CA: CPT | Performed by: HOSPITALIST

## 2021-10-05 PROCEDURE — 84145 PROCALCITONIN (PCT): CPT

## 2021-10-05 PROCEDURE — 92610 EVALUATE SWALLOWING FUNCTION: CPT

## 2021-10-05 PROCEDURE — 97167 OT EVAL HIGH COMPLEX 60 MIN: CPT

## 2021-10-05 PROCEDURE — 82948 REAGENT STRIP/BLOOD GLUCOSE: CPT

## 2021-10-05 PROCEDURE — 93306 TTE W/DOPPLER COMPLETE: CPT | Performed by: INTERNAL MEDICINE

## 2021-10-05 RX ORDER — LOPERAMIDE HYDROCHLORIDE 2 MG/1
2 CAPSULE ORAL 3 TIMES DAILY PRN
Status: DISCONTINUED | OUTPATIENT
Start: 2021-10-05 | End: 2021-10-16 | Stop reason: HOSPADM

## 2021-10-05 RX ORDER — FUROSEMIDE 10 MG/ML
40 INJECTION INTRAMUSCULAR; INTRAVENOUS
Status: DISCONTINUED | OUTPATIENT
Start: 2021-10-05 | End: 2021-10-06

## 2021-10-05 RX ADMIN — Medication 12.5 MG: at 09:33

## 2021-10-05 RX ADMIN — PERFLUTREN 0.6 ML/MIN: 6.52 INJECTION, SUSPENSION INTRAVENOUS at 15:36

## 2021-10-05 RX ADMIN — Medication 6 MG: at 22:17

## 2021-10-05 RX ADMIN — ATORVASTATIN CALCIUM 40 MG: 40 TABLET, FILM COATED ORAL at 17:41

## 2021-10-05 RX ADMIN — INSULIN LISPRO 1 UNITS: 100 INJECTION, SOLUTION INTRAVENOUS; SUBCUTANEOUS at 11:56

## 2021-10-05 RX ADMIN — FUROSEMIDE 40 MG: 10 INJECTION, SOLUTION INTRAMUSCULAR; INTRAVENOUS at 11:58

## 2021-10-05 RX ADMIN — INSULIN GLARGINE 22 UNITS: 100 INJECTION, SOLUTION SUBCUTANEOUS at 22:17

## 2021-10-05 RX ADMIN — TORSEMIDE 20 MG: 20 TABLET ORAL at 09:33

## 2021-10-05 RX ADMIN — Medication: at 09:31

## 2021-10-05 RX ADMIN — Medication 12.5 MG: at 22:17

## 2021-10-05 RX ADMIN — FLUTICASONE FUROATE AND VILANTEROL TRIFENATATE 1 PUFF: 200; 25 POWDER RESPIRATORY (INHALATION) at 09:31

## 2021-10-05 RX ADMIN — FUROSEMIDE 40 MG: 10 INJECTION, SOLUTION INTRAMUSCULAR; INTRAVENOUS at 17:41

## 2021-10-05 RX ADMIN — ALLOPURINOL 100 MG: 100 TABLET ORAL at 09:31

## 2021-10-06 PROBLEM — I50.43 ACUTE ON CHRONIC COMBINED SYSTOLIC AND DIASTOLIC CONGESTIVE HEART FAILURE (HCC): Status: ACTIVE | Noted: 2017-10-11

## 2021-10-06 LAB
ANION GAP SERPL CALCULATED.3IONS-SCNC: 7 MMOL/L (ref 4–13)
BUN SERPL-MCNC: 31 MG/DL (ref 5–25)
CALCIUM SERPL-MCNC: 9.3 MG/DL (ref 8.3–10.1)
CHLORIDE SERPL-SCNC: 106 MMOL/L (ref 100–108)
CO2 SERPL-SCNC: 25 MMOL/L (ref 21–32)
CREAT SERPL-MCNC: 3.18 MG/DL (ref 0.6–1.3)
GFR SERPL CREATININE-BSD FRML MDRD: 19 ML/MIN/1.73SQ M
GLUCOSE SERPL-MCNC: 110 MG/DL (ref 65–140)
GLUCOSE SERPL-MCNC: 119 MG/DL (ref 65–140)
GLUCOSE SERPL-MCNC: 138 MG/DL (ref 65–140)
GLUCOSE SERPL-MCNC: 140 MG/DL (ref 65–140)
GLUCOSE SERPL-MCNC: 157 MG/DL (ref 65–140)
INR PPP: 2.8 (ref 0.84–1.19)
POTASSIUM SERPL-SCNC: 3.8 MMOL/L (ref 3.5–5.3)
PROTHROMBIN TIME: 28.1 SECONDS (ref 11.6–14.5)
SODIUM SERPL-SCNC: 138 MMOL/L (ref 136–145)

## 2021-10-06 PROCEDURE — 99232 SBSQ HOSP IP/OBS MODERATE 35: CPT | Performed by: PHYSICIAN ASSISTANT

## 2021-10-06 PROCEDURE — 99222 1ST HOSP IP/OBS MODERATE 55: CPT | Performed by: INTERNAL MEDICINE

## 2021-10-06 PROCEDURE — 82948 REAGENT STRIP/BLOOD GLUCOSE: CPT

## 2021-10-06 PROCEDURE — 85610 PROTHROMBIN TIME: CPT | Performed by: HOSPITALIST

## 2021-10-06 PROCEDURE — 80048 BASIC METABOLIC PNL TOTAL CA: CPT | Performed by: PHYSICIAN ASSISTANT

## 2021-10-06 PROCEDURE — 99223 1ST HOSP IP/OBS HIGH 75: CPT | Performed by: INTERNAL MEDICINE

## 2021-10-06 RX ORDER — COLCHICINE 0.6 MG/1
0.6 TABLET ORAL DAILY
Status: COMPLETED | OUTPATIENT
Start: 2021-10-06 | End: 2021-10-09

## 2021-10-06 RX ORDER — FUROSEMIDE 10 MG/ML
40 INJECTION INTRAMUSCULAR; INTRAVENOUS ONCE
Status: DISCONTINUED | OUTPATIENT
Start: 2021-10-06 | End: 2021-10-06

## 2021-10-06 RX ORDER — BUMETANIDE 0.25 MG/ML
2 INJECTION, SOLUTION INTRAMUSCULAR; INTRAVENOUS 2 TIMES DAILY
Status: DISCONTINUED | OUTPATIENT
Start: 2021-10-06 | End: 2021-10-07

## 2021-10-06 RX ORDER — FUROSEMIDE 10 MG/ML
80 INJECTION INTRAMUSCULAR; INTRAVENOUS
Status: DISCONTINUED | OUTPATIENT
Start: 2021-10-06 | End: 2021-10-06

## 2021-10-06 RX ORDER — POTASSIUM CHLORIDE 20 MEQ/1
20 TABLET, EXTENDED RELEASE ORAL 2 TIMES DAILY
Status: DISCONTINUED | OUTPATIENT
Start: 2021-10-06 | End: 2021-10-06

## 2021-10-06 RX ORDER — WARFARIN SODIUM 5 MG/1
5 TABLET ORAL
Status: DISCONTINUED | OUTPATIENT
Start: 2021-10-06 | End: 2021-10-07

## 2021-10-06 RX ADMIN — WARFARIN SODIUM 5 MG: 5 TABLET ORAL at 17:50

## 2021-10-06 RX ADMIN — BUMETANIDE 2 MG: 0.25 INJECTION, SOLUTION INTRAMUSCULAR; INTRAVENOUS at 17:51

## 2021-10-06 RX ADMIN — FLUTICASONE FUROATE AND VILANTEROL TRIFENATATE 1 PUFF: 200; 25 POWDER RESPIRATORY (INHALATION) at 08:24

## 2021-10-06 RX ADMIN — BUMETANIDE 2 MG: 0.25 INJECTION, SOLUTION INTRAMUSCULAR; INTRAVENOUS at 12:27

## 2021-10-06 RX ADMIN — POTASSIUM CHLORIDE 20 MEQ: 1500 TABLET, EXTENDED RELEASE ORAL at 12:27

## 2021-10-06 RX ADMIN — Medication 12.5 MG: at 08:23

## 2021-10-06 RX ADMIN — Medication: at 17:53

## 2021-10-06 RX ADMIN — Medication: at 08:25

## 2021-10-06 RX ADMIN — Medication 6 MG: at 22:03

## 2021-10-06 RX ADMIN — Medication 12.5 MG: at 22:03

## 2021-10-06 RX ADMIN — INSULIN GLARGINE 22 UNITS: 100 INJECTION, SOLUTION SUBCUTANEOUS at 22:03

## 2021-10-06 RX ADMIN — ALLOPURINOL 100 MG: 100 TABLET ORAL at 08:24

## 2021-10-06 RX ADMIN — ATORVASTATIN CALCIUM 40 MG: 40 TABLET, FILM COATED ORAL at 17:50

## 2021-10-06 RX ADMIN — COLCHICINE 0.6 MG: 0.6 TABLET ORAL at 12:27

## 2021-10-06 RX ADMIN — INSULIN LISPRO 2 UNITS: 100 INJECTION, SOLUTION INTRAVENOUS; SUBCUTANEOUS at 22:03

## 2021-10-06 RX ADMIN — FUROSEMIDE 40 MG: 10 INJECTION, SOLUTION INTRAMUSCULAR; INTRAVENOUS at 08:23

## 2021-10-07 ENCOUNTER — APPOINTMENT (INPATIENT)
Dept: RADIOLOGY | Facility: HOSPITAL | Age: 71
DRG: 291 | End: 2021-10-07
Payer: COMMERCIAL

## 2021-10-07 LAB
ANION GAP SERPL CALCULATED.3IONS-SCNC: 7 MMOL/L (ref 4–13)
ANION GAP SERPL CALCULATED.3IONS-SCNC: 9 MMOL/L (ref 4–13)
BUN SERPL-MCNC: 34 MG/DL (ref 5–25)
BUN SERPL-MCNC: 41 MG/DL (ref 5–25)
CALCIUM SERPL-MCNC: 8.5 MG/DL (ref 8.3–10.1)
CALCIUM SERPL-MCNC: 8.6 MG/DL (ref 8.3–10.1)
CHLORIDE SERPL-SCNC: 107 MMOL/L (ref 100–108)
CHLORIDE SERPL-SCNC: 107 MMOL/L (ref 100–108)
CO2 SERPL-SCNC: 22 MMOL/L (ref 21–32)
CO2 SERPL-SCNC: 24 MMOL/L (ref 21–32)
CREAT SERPL-MCNC: 3.97 MG/DL (ref 0.6–1.3)
CREAT SERPL-MCNC: 4.36 MG/DL (ref 0.6–1.3)
ERYTHROCYTE [DISTWIDTH] IN BLOOD BY AUTOMATED COUNT: 15.9 % (ref 11.6–15.1)
GFR SERPL CREATININE-BSD FRML MDRD: 13 ML/MIN/1.73SQ M
GFR SERPL CREATININE-BSD FRML MDRD: 14 ML/MIN/1.73SQ M
GLUCOSE SERPL-MCNC: 104 MG/DL (ref 65–140)
GLUCOSE SERPL-MCNC: 106 MG/DL (ref 65–140)
GLUCOSE SERPL-MCNC: 128 MG/DL (ref 65–140)
GLUCOSE SERPL-MCNC: 142 MG/DL (ref 65–140)
GLUCOSE SERPL-MCNC: 148 MG/DL (ref 65–140)
GLUCOSE SERPL-MCNC: 156 MG/DL (ref 65–140)
HCT VFR BLD AUTO: 27.8 % (ref 36.5–49.3)
HGB BLD-MCNC: 8.5 G/DL (ref 12–17)
INR PPP: 2.46 (ref 0.84–1.19)
MAGNESIUM SERPL-MCNC: 2.1 MG/DL (ref 1.6–2.6)
MCH RBC QN AUTO: 26.4 PG (ref 26.8–34.3)
MCHC RBC AUTO-ENTMCNC: 30.6 G/DL (ref 31.4–37.4)
MCV RBC AUTO: 86 FL (ref 82–98)
PLATELET # BLD AUTO: 372 THOUSANDS/UL (ref 149–390)
PMV BLD AUTO: 9.6 FL (ref 8.9–12.7)
POTASSIUM SERPL-SCNC: 3.9 MMOL/L (ref 3.5–5.3)
POTASSIUM SERPL-SCNC: 4 MMOL/L (ref 3.5–5.3)
PROTHROMBIN TIME: 25.4 SECONDS (ref 11.6–14.5)
RBC # BLD AUTO: 3.22 MILLION/UL (ref 3.88–5.62)
SODIUM SERPL-SCNC: 138 MMOL/L (ref 136–145)
SODIUM SERPL-SCNC: 138 MMOL/L (ref 136–145)
WBC # BLD AUTO: 6.06 THOUSAND/UL (ref 4.31–10.16)

## 2021-10-07 PROCEDURE — 82948 REAGENT STRIP/BLOOD GLUCOSE: CPT

## 2021-10-07 PROCEDURE — 99232 SBSQ HOSP IP/OBS MODERATE 35: CPT | Performed by: INTERNAL MEDICINE

## 2021-10-07 PROCEDURE — 83735 ASSAY OF MAGNESIUM: CPT | Performed by: NURSE PRACTITIONER

## 2021-10-07 PROCEDURE — 71046 X-RAY EXAM CHEST 2 VIEWS: CPT

## 2021-10-07 PROCEDURE — 99233 SBSQ HOSP IP/OBS HIGH 50: CPT | Performed by: INTERNAL MEDICINE

## 2021-10-07 PROCEDURE — 85027 COMPLETE CBC AUTOMATED: CPT | Performed by: NURSE PRACTITIONER

## 2021-10-07 PROCEDURE — 80048 BASIC METABOLIC PNL TOTAL CA: CPT | Performed by: PHYSICIAN ASSISTANT

## 2021-10-07 PROCEDURE — 80048 BASIC METABOLIC PNL TOTAL CA: CPT | Performed by: NURSE PRACTITIONER

## 2021-10-07 PROCEDURE — 85610 PROTHROMBIN TIME: CPT | Performed by: HOSPITALIST

## 2021-10-07 PROCEDURE — 99232 SBSQ HOSP IP/OBS MODERATE 35: CPT | Performed by: PHYSICIAN ASSISTANT

## 2021-10-07 RX ORDER — BUMETANIDE 0.25 MG/ML
2 INJECTION INTRAMUSCULAR; INTRAVENOUS CONTINUOUS
Status: DISCONTINUED | OUTPATIENT
Start: 2021-10-07 | End: 2021-10-10

## 2021-10-07 RX ORDER — BUMETANIDE 0.25 MG/ML
3 INJECTION, SOLUTION INTRAMUSCULAR; INTRAVENOUS ONCE
Status: DISCONTINUED | OUTPATIENT
Start: 2021-10-07 | End: 2021-10-13

## 2021-10-07 RX ADMIN — FLUTICASONE FUROATE AND VILANTEROL TRIFENATATE 1 PUFF: 200; 25 POWDER RESPIRATORY (INHALATION) at 11:20

## 2021-10-07 RX ADMIN — ALLOPURINOL 100 MG: 100 TABLET ORAL at 11:19

## 2021-10-07 RX ADMIN — INSULIN GLARGINE 22 UNITS: 100 INJECTION, SOLUTION SUBCUTANEOUS at 21:22

## 2021-10-07 RX ADMIN — Medication 1 MG/HR: at 13:15

## 2021-10-07 RX ADMIN — Medication: at 11:21

## 2021-10-07 RX ADMIN — ATORVASTATIN CALCIUM 40 MG: 40 TABLET, FILM COATED ORAL at 17:37

## 2021-10-07 RX ADMIN — Medication 6 MG: at 21:23

## 2021-10-07 RX ADMIN — Medication 12.5 MG: at 21:25

## 2021-10-07 RX ADMIN — Medication 1 MG/HR: at 17:37

## 2021-10-07 RX ADMIN — Medication 12.5 MG: at 11:19

## 2021-10-07 RX ADMIN — COLCHICINE 0.6 MG: 0.6 TABLET ORAL at 11:19

## 2021-10-08 LAB
ANION GAP SERPL CALCULATED.3IONS-SCNC: 9 MMOL/L (ref 4–13)
BASE EX.OXY STD BLDV CALC-SCNC: 77.4 % (ref 60–80)
BASE EXCESS BLDV CALC-SCNC: -3.4 MMOL/L
BUN SERPL-MCNC: 45 MG/DL (ref 5–25)
CALCIUM SERPL-MCNC: 8.3 MG/DL (ref 8.3–10.1)
CHLORIDE SERPL-SCNC: 105 MMOL/L (ref 100–108)
CO2 SERPL-SCNC: 22 MMOL/L (ref 21–32)
CREAT SERPL-MCNC: 4.72 MG/DL (ref 0.6–1.3)
GFR SERPL CREATININE-BSD FRML MDRD: 12 ML/MIN/1.73SQ M
GLUCOSE SERPL-MCNC: 100 MG/DL (ref 65–140)
GLUCOSE SERPL-MCNC: 100 MG/DL (ref 65–140)
GLUCOSE SERPL-MCNC: 107 MG/DL (ref 65–140)
GLUCOSE SERPL-MCNC: 124 MG/DL (ref 65–140)
GLUCOSE SERPL-MCNC: 133 MG/DL (ref 65–140)
GLUCOSE SERPL-MCNC: 139 MG/DL (ref 65–140)
HCO3 BLDV-SCNC: 21.8 MMOL/L (ref 24–30)
INR PPP: 2.19 (ref 0.84–1.19)
O2 CT BLDV-SCNC: 10.3 ML/DL
PCO2 BLDV: 39.4 MM HG (ref 42–50)
PH BLDV: 7.36 [PH] (ref 7.3–7.4)
PO2 BLDV: 43.8 MM HG (ref 35–45)
POTASSIUM SERPL-SCNC: 3.8 MMOL/L (ref 3.5–5.3)
PROTHROMBIN TIME: 23.3 SECONDS (ref 11.6–14.5)
SODIUM SERPL-SCNC: 136 MMOL/L (ref 136–145)

## 2021-10-08 PROCEDURE — 82948 REAGENT STRIP/BLOOD GLUCOSE: CPT

## 2021-10-08 PROCEDURE — 82805 BLOOD GASES W/O2 SATURATION: CPT | Performed by: PHYSICIAN ASSISTANT

## 2021-10-08 PROCEDURE — 99232 SBSQ HOSP IP/OBS MODERATE 35: CPT | Performed by: INTERNAL MEDICINE

## 2021-10-08 PROCEDURE — 97535 SELF CARE MNGMENT TRAINING: CPT

## 2021-10-08 PROCEDURE — 99233 SBSQ HOSP IP/OBS HIGH 50: CPT | Performed by: INTERNAL MEDICINE

## 2021-10-08 PROCEDURE — 80048 BASIC METABOLIC PNL TOTAL CA: CPT | Performed by: INTERNAL MEDICINE

## 2021-10-08 PROCEDURE — 85610 PROTHROMBIN TIME: CPT | Performed by: PHYSICIAN ASSISTANT

## 2021-10-08 RX ADMIN — LOPERAMIDE HYDROCHLORIDE 2 MG: 2 CAPSULE ORAL at 22:23

## 2021-10-08 RX ADMIN — Medication 2 MG/HR: at 09:20

## 2021-10-08 RX ADMIN — Medication 2 MG/HR: at 01:00

## 2021-10-08 RX ADMIN — Medication 12.5 MG: at 22:23

## 2021-10-08 RX ADMIN — Medication: at 16:34

## 2021-10-08 RX ADMIN — INSULIN GLARGINE 22 UNITS: 100 INJECTION, SOLUTION SUBCUTANEOUS at 22:23

## 2021-10-08 RX ADMIN — Medication 12.5 MG: at 08:59

## 2021-10-08 RX ADMIN — ATORVASTATIN CALCIUM 40 MG: 40 TABLET, FILM COATED ORAL at 16:33

## 2021-10-08 RX ADMIN — FLUTICASONE FUROATE AND VILANTEROL TRIFENATATE 1 PUFF: 200; 25 POWDER RESPIRATORY (INHALATION) at 12:09

## 2021-10-08 RX ADMIN — Medication 2 MG/HR: at 16:32

## 2021-10-08 RX ADMIN — Medication 2 MG/HR: at 23:22

## 2021-10-08 RX ADMIN — ALLOPURINOL 100 MG: 100 TABLET ORAL at 08:59

## 2021-10-08 RX ADMIN — Medication 6 MG: at 22:23

## 2021-10-08 RX ADMIN — Medication: at 08:59

## 2021-10-08 RX ADMIN — COLCHICINE 0.6 MG: 0.6 TABLET ORAL at 08:59

## 2021-10-09 LAB
ALBUMIN SERPL BCP-MCNC: 2.2 G/DL (ref 3.5–5)
ALP SERPL-CCNC: 147 U/L (ref 46–116)
ALT SERPL W P-5'-P-CCNC: 13 U/L (ref 12–78)
ANION GAP SERPL CALCULATED.3IONS-SCNC: 9 MMOL/L (ref 4–13)
AST SERPL W P-5'-P-CCNC: 12 U/L (ref 5–45)
BACTERIA BLD CULT: NORMAL
BACTERIA BLD CULT: NORMAL
BASOPHILS # BLD AUTO: 0.05 THOUSANDS/ΜL (ref 0–0.1)
BASOPHILS NFR BLD AUTO: 1 % (ref 0–1)
BILIRUB SERPL-MCNC: 0.41 MG/DL (ref 0.2–1)
BUN SERPL-MCNC: 49 MG/DL (ref 5–25)
CALCIUM ALBUM COR SERPL-MCNC: 10.1 MG/DL (ref 8.3–10.1)
CALCIUM SERPL-MCNC: 8.7 MG/DL (ref 8.3–10.1)
CHLORIDE SERPL-SCNC: 108 MMOL/L (ref 100–108)
CO2 SERPL-SCNC: 21 MMOL/L (ref 21–32)
CREAT SERPL-MCNC: 3.7 MG/DL (ref 0.6–1.3)
EOSINOPHIL # BLD AUTO: 0.19 THOUSAND/ΜL (ref 0–0.61)
EOSINOPHIL NFR BLD AUTO: 4 % (ref 0–6)
ERYTHROCYTE [DISTWIDTH] IN BLOOD BY AUTOMATED COUNT: 15.7 % (ref 11.6–15.1)
GFR SERPL CREATININE-BSD FRML MDRD: 16 ML/MIN/1.73SQ M
GLUCOSE SERPL-MCNC: 103 MG/DL (ref 65–140)
GLUCOSE SERPL-MCNC: 118 MG/DL (ref 65–140)
GLUCOSE SERPL-MCNC: 151 MG/DL (ref 65–140)
GLUCOSE SERPL-MCNC: 163 MG/DL (ref 65–140)
GLUCOSE SERPL-MCNC: 98 MG/DL (ref 65–140)
HCT VFR BLD AUTO: 28.7 % (ref 36.5–49.3)
HGB BLD-MCNC: 8.8 G/DL (ref 12–17)
IMM GRANULOCYTES # BLD AUTO: 0.03 THOUSAND/UL (ref 0–0.2)
IMM GRANULOCYTES NFR BLD AUTO: 1 % (ref 0–2)
INR PPP: 1.8 (ref 0.84–1.19)
LYMPHOCYTES # BLD AUTO: 1.11 THOUSANDS/ΜL (ref 0.6–4.47)
LYMPHOCYTES NFR BLD AUTO: 23 % (ref 14–44)
MAGNESIUM SERPL-MCNC: 2 MG/DL (ref 1.6–2.6)
MCH RBC QN AUTO: 26 PG (ref 26.8–34.3)
MCHC RBC AUTO-ENTMCNC: 30.7 G/DL (ref 31.4–37.4)
MCV RBC AUTO: 85 FL (ref 82–98)
MONOCYTES # BLD AUTO: 0.53 THOUSAND/ΜL (ref 0.17–1.22)
MONOCYTES NFR BLD AUTO: 11 % (ref 4–12)
NEUTROPHILS # BLD AUTO: 2.89 THOUSANDS/ΜL (ref 1.85–7.62)
NEUTS SEG NFR BLD AUTO: 60 % (ref 43–75)
NRBC BLD AUTO-RTO: 0 /100 WBCS
PHOSPHATE SERPL-MCNC: 6.1 MG/DL (ref 2.3–4.1)
PLATELET # BLD AUTO: 388 THOUSANDS/UL (ref 149–390)
PMV BLD AUTO: 9.2 FL (ref 8.9–12.7)
POTASSIUM SERPL-SCNC: 3.7 MMOL/L (ref 3.5–5.3)
PROT SERPL-MCNC: 6.9 G/DL (ref 6.4–8.2)
PROTHROMBIN TIME: 20 SECONDS (ref 11.6–14.5)
RBC # BLD AUTO: 3.38 MILLION/UL (ref 3.88–5.62)
SODIUM SERPL-SCNC: 138 MMOL/L (ref 136–145)
WBC # BLD AUTO: 4.8 THOUSAND/UL (ref 4.31–10.16)

## 2021-10-09 PROCEDURE — 85025 COMPLETE CBC W/AUTO DIFF WBC: CPT | Performed by: INTERNAL MEDICINE

## 2021-10-09 PROCEDURE — 85610 PROTHROMBIN TIME: CPT | Performed by: INTERNAL MEDICINE

## 2021-10-09 PROCEDURE — 84100 ASSAY OF PHOSPHORUS: CPT | Performed by: INTERNAL MEDICINE

## 2021-10-09 PROCEDURE — 99232 SBSQ HOSP IP/OBS MODERATE 35: CPT | Performed by: INTERNAL MEDICINE

## 2021-10-09 PROCEDURE — 82948 REAGENT STRIP/BLOOD GLUCOSE: CPT

## 2021-10-09 PROCEDURE — 83735 ASSAY OF MAGNESIUM: CPT | Performed by: INTERNAL MEDICINE

## 2021-10-09 PROCEDURE — 80053 COMPREHEN METABOLIC PANEL: CPT | Performed by: INTERNAL MEDICINE

## 2021-10-09 RX ADMIN — Medication 2 MG/HR: at 18:05

## 2021-10-09 RX ADMIN — FLUTICASONE FUROATE AND VILANTEROL TRIFENATATE 1 PUFF: 200; 25 POWDER RESPIRATORY (INHALATION) at 08:58

## 2021-10-09 RX ADMIN — ATORVASTATIN CALCIUM 40 MG: 40 TABLET, FILM COATED ORAL at 18:01

## 2021-10-09 RX ADMIN — INSULIN GLARGINE 22 UNITS: 100 INJECTION, SOLUTION SUBCUTANEOUS at 21:35

## 2021-10-09 RX ADMIN — INSULIN LISPRO 1 UNITS: 100 INJECTION, SOLUTION INTRAVENOUS; SUBCUTANEOUS at 21:37

## 2021-10-09 RX ADMIN — ALLOPURINOL 100 MG: 100 TABLET ORAL at 08:58

## 2021-10-09 RX ADMIN — COLCHICINE 0.6 MG: 0.6 TABLET ORAL at 08:58

## 2021-10-09 RX ADMIN — WARFARIN SODIUM 6 MG: 5 TABLET ORAL at 18:01

## 2021-10-09 RX ADMIN — Medication 2 MG/HR: at 12:01

## 2021-10-09 RX ADMIN — Medication 2 MG/HR: at 06:26

## 2021-10-09 RX ADMIN — Medication: at 08:57

## 2021-10-09 RX ADMIN — Medication 6 MG: at 21:33

## 2021-10-09 RX ADMIN — Medication 2 MG/HR: at 23:32

## 2021-10-09 RX ADMIN — Medication 1 APPLICATION: at 18:05

## 2021-10-10 LAB
ANION GAP SERPL CALCULATED.3IONS-SCNC: 7 MMOL/L (ref 4–13)
BASOPHILS # BLD AUTO: 0.06 THOUSANDS/ΜL (ref 0–0.1)
BASOPHILS NFR BLD AUTO: 1 % (ref 0–1)
BUN SERPL-MCNC: 47 MG/DL (ref 5–25)
CALCIUM SERPL-MCNC: 9.2 MG/DL (ref 8.3–10.1)
CHLORIDE SERPL-SCNC: 107 MMOL/L (ref 100–108)
CO2 SERPL-SCNC: 25 MMOL/L (ref 21–32)
CREAT SERPL-MCNC: 2.8 MG/DL (ref 0.6–1.3)
EOSINOPHIL # BLD AUTO: 0.21 THOUSAND/ΜL (ref 0–0.61)
EOSINOPHIL NFR BLD AUTO: 4 % (ref 0–6)
ERYTHROCYTE [DISTWIDTH] IN BLOOD BY AUTOMATED COUNT: 15.9 % (ref 11.6–15.1)
GFR SERPL CREATININE-BSD FRML MDRD: 22 ML/MIN/1.73SQ M
GLUCOSE SERPL-MCNC: 124 MG/DL (ref 65–140)
GLUCOSE SERPL-MCNC: 125 MG/DL (ref 65–140)
GLUCOSE SERPL-MCNC: 138 MG/DL (ref 65–140)
GLUCOSE SERPL-MCNC: 148 MG/DL (ref 65–140)
GLUCOSE SERPL-MCNC: 268 MG/DL (ref 65–140)
HCT VFR BLD AUTO: 32.7 % (ref 36.5–49.3)
HGB BLD-MCNC: 10 G/DL (ref 12–17)
IMM GRANULOCYTES # BLD AUTO: 0.02 THOUSAND/UL (ref 0–0.2)
IMM GRANULOCYTES NFR BLD AUTO: 0 % (ref 0–2)
LYMPHOCYTES # BLD AUTO: 1.16 THOUSANDS/ΜL (ref 0.6–4.47)
LYMPHOCYTES NFR BLD AUTO: 22 % (ref 14–44)
MCH RBC QN AUTO: 25.6 PG (ref 26.8–34.3)
MCHC RBC AUTO-ENTMCNC: 30.6 G/DL (ref 31.4–37.4)
MCV RBC AUTO: 84 FL (ref 82–98)
MONOCYTES # BLD AUTO: 0.6 THOUSAND/ΜL (ref 0.17–1.22)
MONOCYTES NFR BLD AUTO: 12 % (ref 4–12)
NEUTROPHILS # BLD AUTO: 3.18 THOUSANDS/ΜL (ref 1.85–7.62)
NEUTS SEG NFR BLD AUTO: 61 % (ref 43–75)
NRBC BLD AUTO-RTO: 0 /100 WBCS
PLATELET # BLD AUTO: 416 THOUSANDS/UL (ref 149–390)
PMV BLD AUTO: 9.8 FL (ref 8.9–12.7)
POTASSIUM SERPL-SCNC: 3.6 MMOL/L (ref 3.5–5.3)
RBC # BLD AUTO: 3.9 MILLION/UL (ref 3.88–5.62)
SODIUM SERPL-SCNC: 139 MMOL/L (ref 136–145)
WBC # BLD AUTO: 5.23 THOUSAND/UL (ref 4.31–10.16)

## 2021-10-10 PROCEDURE — 99232 SBSQ HOSP IP/OBS MODERATE 35: CPT | Performed by: INTERNAL MEDICINE

## 2021-10-10 PROCEDURE — 82948 REAGENT STRIP/BLOOD GLUCOSE: CPT

## 2021-10-10 PROCEDURE — 85025 COMPLETE CBC W/AUTO DIFF WBC: CPT | Performed by: INTERNAL MEDICINE

## 2021-10-10 PROCEDURE — 80048 BASIC METABOLIC PNL TOTAL CA: CPT | Performed by: INTERNAL MEDICINE

## 2021-10-10 RX ORDER — POLYETHYLENE GLYCOL 3350 17 G/17G
17 POWDER, FOR SOLUTION ORAL DAILY
Status: DISCONTINUED | OUTPATIENT
Start: 2021-10-10 | End: 2021-10-16 | Stop reason: HOSPADM

## 2021-10-10 RX ORDER — BUMETANIDE 0.25 MG/ML
2 INJECTION, SOLUTION INTRAMUSCULAR; INTRAVENOUS 3 TIMES DAILY
Status: DISCONTINUED | OUTPATIENT
Start: 2021-10-10 | End: 2021-10-13

## 2021-10-10 RX ORDER — DOCUSATE SODIUM 100 MG/1
100 CAPSULE, LIQUID FILLED ORAL 2 TIMES DAILY
Status: DISCONTINUED | OUTPATIENT
Start: 2021-10-10 | End: 2021-10-16 | Stop reason: HOSPADM

## 2021-10-10 RX ADMIN — Medication: at 18:01

## 2021-10-10 RX ADMIN — POLYETHYLENE GLYCOL 3350 17 G: 17 POWDER, FOR SOLUTION ORAL at 12:58

## 2021-10-10 RX ADMIN — ALLOPURINOL 100 MG: 100 TABLET ORAL at 09:11

## 2021-10-10 RX ADMIN — ACETAMINOPHEN 488 MG: 325 TABLET, FILM COATED ORAL at 09:21

## 2021-10-10 RX ADMIN — INSULIN LISPRO 2 UNITS: 100 INJECTION, SOLUTION INTRAVENOUS; SUBCUTANEOUS at 22:01

## 2021-10-10 RX ADMIN — BUMETANIDE 2 MG: 0.25 INJECTION, SOLUTION INTRAMUSCULAR; INTRAVENOUS at 17:00

## 2021-10-10 RX ADMIN — Medication 2 MG/HR: at 05:04

## 2021-10-10 RX ADMIN — INSULIN GLARGINE 22 UNITS: 100 INJECTION, SOLUTION SUBCUTANEOUS at 22:00

## 2021-10-10 RX ADMIN — Medication: at 09:10

## 2021-10-10 RX ADMIN — FLUTICASONE FUROATE AND VILANTEROL TRIFENATATE 1 PUFF: 200; 25 POWDER RESPIRATORY (INHALATION) at 09:10

## 2021-10-10 RX ADMIN — DOCUSATE SODIUM 100 MG: 100 CAPSULE ORAL at 12:58

## 2021-10-10 RX ADMIN — WARFARIN SODIUM 6 MG: 5 TABLET ORAL at 18:06

## 2021-10-10 RX ADMIN — Medication 2 MG/HR: at 11:02

## 2021-10-10 RX ADMIN — ATORVASTATIN CALCIUM 40 MG: 40 TABLET, FILM COATED ORAL at 17:59

## 2021-10-10 RX ADMIN — Medication 6 MG: at 22:00

## 2021-10-11 LAB
ANION GAP SERPL CALCULATED.3IONS-SCNC: 6 MMOL/L (ref 4–13)
BUN SERPL-MCNC: 47 MG/DL (ref 5–25)
CALCIUM SERPL-MCNC: 9.3 MG/DL (ref 8.3–10.1)
CHLORIDE SERPL-SCNC: 106 MMOL/L (ref 100–108)
CO2 SERPL-SCNC: 27 MMOL/L (ref 21–32)
CREAT SERPL-MCNC: 2.48 MG/DL (ref 0.6–1.3)
GFR SERPL CREATININE-BSD FRML MDRD: 25 ML/MIN/1.73SQ M
GLUCOSE SERPL-MCNC: 135 MG/DL (ref 65–140)
GLUCOSE SERPL-MCNC: 138 MG/DL (ref 65–140)
GLUCOSE SERPL-MCNC: 150 MG/DL (ref 65–140)
GLUCOSE SERPL-MCNC: 155 MG/DL (ref 65–140)
GLUCOSE SERPL-MCNC: 178 MG/DL (ref 65–140)
INR PPP: 1.73 (ref 0.84–1.19)
POTASSIUM SERPL-SCNC: 3.5 MMOL/L (ref 3.5–5.3)
PROTHROMBIN TIME: 19.5 SECONDS (ref 11.6–14.5)
SODIUM SERPL-SCNC: 139 MMOL/L (ref 136–145)

## 2021-10-11 PROCEDURE — 99232 SBSQ HOSP IP/OBS MODERATE 35: CPT | Performed by: INTERNAL MEDICINE

## 2021-10-11 PROCEDURE — 80048 BASIC METABOLIC PNL TOTAL CA: CPT | Performed by: INTERNAL MEDICINE

## 2021-10-11 PROCEDURE — 85610 PROTHROMBIN TIME: CPT | Performed by: INTERNAL MEDICINE

## 2021-10-11 PROCEDURE — 82948 REAGENT STRIP/BLOOD GLUCOSE: CPT

## 2021-10-11 RX ORDER — POTASSIUM CHLORIDE 20 MEQ/1
40 TABLET, EXTENDED RELEASE ORAL ONCE
Status: COMPLETED | OUTPATIENT
Start: 2021-10-11 | End: 2021-10-11

## 2021-10-11 RX ORDER — METOPROLOL SUCCINATE 25 MG/1
25 TABLET, EXTENDED RELEASE ORAL DAILY
Status: DISCONTINUED | OUTPATIENT
Start: 2021-10-11 | End: 2021-10-16 | Stop reason: HOSPADM

## 2021-10-11 RX ADMIN — ALLOPURINOL 100 MG: 100 TABLET ORAL at 08:07

## 2021-10-11 RX ADMIN — FLUTICASONE FUROATE AND VILANTEROL TRIFENATATE 1 PUFF: 200; 25 POWDER RESPIRATORY (INHALATION) at 08:08

## 2021-10-11 RX ADMIN — METOPROLOL SUCCINATE 25 MG: 25 TABLET, FILM COATED, EXTENDED RELEASE ORAL at 12:23

## 2021-10-11 RX ADMIN — INSULIN LISPRO 1 UNITS: 100 INJECTION, SOLUTION INTRAVENOUS; SUBCUTANEOUS at 21:27

## 2021-10-11 RX ADMIN — INSULIN GLARGINE 22 UNITS: 100 INJECTION, SOLUTION SUBCUTANEOUS at 21:27

## 2021-10-11 RX ADMIN — DOCUSATE SODIUM 100 MG: 100 CAPSULE ORAL at 08:07

## 2021-10-11 RX ADMIN — DOCUSATE SODIUM 100 MG: 100 CAPSULE ORAL at 17:47

## 2021-10-11 RX ADMIN — Medication 6 MG: at 21:23

## 2021-10-11 RX ADMIN — Medication: at 08:08

## 2021-10-11 RX ADMIN — INSULIN LISPRO 1 UNITS: 100 INJECTION, SOLUTION INTRAVENOUS; SUBCUTANEOUS at 17:46

## 2021-10-11 RX ADMIN — BUMETANIDE 2 MG: 0.25 INJECTION, SOLUTION INTRAMUSCULAR; INTRAVENOUS at 20:29

## 2021-10-11 RX ADMIN — ATORVASTATIN CALCIUM 40 MG: 40 TABLET, FILM COATED ORAL at 17:46

## 2021-10-11 RX ADMIN — POLYETHYLENE GLYCOL 3350 17 G: 17 POWDER, FOR SOLUTION ORAL at 08:07

## 2021-10-11 RX ADMIN — Medication: at 17:52

## 2021-10-11 RX ADMIN — POTASSIUM CHLORIDE 40 MEQ: 1500 TABLET, EXTENDED RELEASE ORAL at 16:36

## 2021-10-11 RX ADMIN — BUMETANIDE 2 MG: 0.25 INJECTION, SOLUTION INTRAMUSCULAR; INTRAVENOUS at 08:08

## 2021-10-11 RX ADMIN — BUMETANIDE 2 MG: 0.25 INJECTION, SOLUTION INTRAMUSCULAR; INTRAVENOUS at 16:37

## 2021-10-11 RX ADMIN — WARFARIN SODIUM 7 MG: 5 TABLET ORAL at 17:47

## 2021-10-12 ENCOUNTER — HOSPITAL ENCOUNTER (OUTPATIENT)
Dept: INFUSION CENTER | Facility: HOSPITAL | Age: 71
End: 2021-10-12
Attending: INTERNAL MEDICINE

## 2021-10-12 LAB
ANION GAP SERPL CALCULATED.3IONS-SCNC: 6 MMOL/L (ref 4–13)
BASOPHILS # BLD AUTO: 0.08 THOUSANDS/ΜL (ref 0–0.1)
BASOPHILS NFR BLD AUTO: 1 % (ref 0–1)
BUN SERPL-MCNC: 48 MG/DL (ref 5–25)
CALCIUM SERPL-MCNC: 9.3 MG/DL (ref 8.3–10.1)
CHLORIDE SERPL-SCNC: 106 MMOL/L (ref 100–108)
CO2 SERPL-SCNC: 27 MMOL/L (ref 21–32)
CREAT SERPL-MCNC: 2.3 MG/DL (ref 0.6–1.3)
EOSINOPHIL # BLD AUTO: 0.2 THOUSAND/ΜL (ref 0–0.61)
EOSINOPHIL NFR BLD AUTO: 3 % (ref 0–6)
ERYTHROCYTE [DISTWIDTH] IN BLOOD BY AUTOMATED COUNT: 15.8 % (ref 11.6–15.1)
GFR SERPL CREATININE-BSD FRML MDRD: 28 ML/MIN/1.73SQ M
GLUCOSE SERPL-MCNC: 144 MG/DL (ref 65–140)
GLUCOSE SERPL-MCNC: 148 MG/DL (ref 65–140)
GLUCOSE SERPL-MCNC: 151 MG/DL (ref 65–140)
GLUCOSE SERPL-MCNC: 189 MG/DL (ref 65–140)
GLUCOSE SERPL-MCNC: 209 MG/DL (ref 65–140)
HCT VFR BLD AUTO: 35.4 % (ref 36.5–49.3)
HGB BLD-MCNC: 10.8 G/DL (ref 12–17)
IMM GRANULOCYTES # BLD AUTO: 0.04 THOUSAND/UL (ref 0–0.2)
IMM GRANULOCYTES NFR BLD AUTO: 1 % (ref 0–2)
INR PPP: 1.8 (ref 0.84–1.19)
LYMPHOCYTES # BLD AUTO: 1.53 THOUSANDS/ΜL (ref 0.6–4.47)
LYMPHOCYTES NFR BLD AUTO: 24 % (ref 14–44)
MCH RBC QN AUTO: 25.8 PG (ref 26.8–34.3)
MCHC RBC AUTO-ENTMCNC: 30.5 G/DL (ref 31.4–37.4)
MCV RBC AUTO: 85 FL (ref 82–98)
MONOCYTES # BLD AUTO: 0.66 THOUSAND/ΜL (ref 0.17–1.22)
MONOCYTES NFR BLD AUTO: 11 % (ref 4–12)
NEUTROPHILS # BLD AUTO: 3.76 THOUSANDS/ΜL (ref 1.85–7.62)
NEUTS SEG NFR BLD AUTO: 60 % (ref 43–75)
NRBC BLD AUTO-RTO: 0 /100 WBCS
PLATELET # BLD AUTO: 410 THOUSANDS/UL (ref 149–390)
PMV BLD AUTO: 9.6 FL (ref 8.9–12.7)
POTASSIUM SERPL-SCNC: 4.1 MMOL/L (ref 3.5–5.3)
PROTHROMBIN TIME: 20.1 SECONDS (ref 11.6–14.5)
RBC # BLD AUTO: 4.18 MILLION/UL (ref 3.88–5.62)
SODIUM SERPL-SCNC: 139 MMOL/L (ref 136–145)
WBC # BLD AUTO: 6.27 THOUSAND/UL (ref 4.31–10.16)

## 2021-10-12 PROCEDURE — 80048 BASIC METABOLIC PNL TOTAL CA: CPT | Performed by: INTERNAL MEDICINE

## 2021-10-12 PROCEDURE — 99232 SBSQ HOSP IP/OBS MODERATE 35: CPT | Performed by: HOSPITALIST

## 2021-10-12 PROCEDURE — 85610 PROTHROMBIN TIME: CPT | Performed by: INTERNAL MEDICINE

## 2021-10-12 PROCEDURE — 99232 SBSQ HOSP IP/OBS MODERATE 35: CPT | Performed by: INTERNAL MEDICINE

## 2021-10-12 PROCEDURE — 94664 DEMO&/EVAL PT USE INHALER: CPT

## 2021-10-12 PROCEDURE — 82948 REAGENT STRIP/BLOOD GLUCOSE: CPT

## 2021-10-12 PROCEDURE — 85025 COMPLETE CBC W/AUTO DIFF WBC: CPT | Performed by: INTERNAL MEDICINE

## 2021-10-12 RX ADMIN — Medication 6 MG: at 22:02

## 2021-10-12 RX ADMIN — ALLOPURINOL 100 MG: 100 TABLET ORAL at 09:33

## 2021-10-12 RX ADMIN — Medication: at 09:35

## 2021-10-12 RX ADMIN — WARFARIN SODIUM 7 MG: 5 TABLET ORAL at 18:05

## 2021-10-12 RX ADMIN — DOCUSATE SODIUM 100 MG: 100 CAPSULE ORAL at 18:05

## 2021-10-12 RX ADMIN — Medication: at 18:08

## 2021-10-12 RX ADMIN — BUMETANIDE 2 MG: 0.25 INJECTION, SOLUTION INTRAMUSCULAR; INTRAVENOUS at 18:05

## 2021-10-12 RX ADMIN — METOPROLOL SUCCINATE 25 MG: 25 TABLET, FILM COATED, EXTENDED RELEASE ORAL at 09:33

## 2021-10-12 RX ADMIN — INSULIN LISPRO 1 UNITS: 100 INJECTION, SOLUTION INTRAVENOUS; SUBCUTANEOUS at 11:41

## 2021-10-12 RX ADMIN — BUMETANIDE 2 MG: 0.25 INJECTION, SOLUTION INTRAMUSCULAR; INTRAVENOUS at 09:33

## 2021-10-12 RX ADMIN — INSULIN LISPRO 1 UNITS: 100 INJECTION, SOLUTION INTRAVENOUS; SUBCUTANEOUS at 22:03

## 2021-10-12 RX ADMIN — DOCUSATE SODIUM 100 MG: 100 CAPSULE ORAL at 09:34

## 2021-10-12 RX ADMIN — FLUTICASONE FUROATE AND VILANTEROL TRIFENATATE 1 PUFF: 200; 25 POWDER RESPIRATORY (INHALATION) at 09:34

## 2021-10-12 RX ADMIN — INSULIN LISPRO 1 UNITS: 100 INJECTION, SOLUTION INTRAVENOUS; SUBCUTANEOUS at 18:08

## 2021-10-12 RX ADMIN — INSULIN GLARGINE 22 UNITS: 100 INJECTION, SOLUTION SUBCUTANEOUS at 22:02

## 2021-10-12 RX ADMIN — BUMETANIDE 2 MG: 0.25 INJECTION, SOLUTION INTRAMUSCULAR; INTRAVENOUS at 22:02

## 2021-10-12 RX ADMIN — ATORVASTATIN CALCIUM 40 MG: 40 TABLET, FILM COATED ORAL at 18:05

## 2021-10-13 LAB
ANION GAP SERPL CALCULATED.3IONS-SCNC: 6 MMOL/L (ref 4–13)
BUN SERPL-MCNC: 49 MG/DL (ref 5–25)
CALCIUM SERPL-MCNC: 9.4 MG/DL (ref 8.3–10.1)
CHLORIDE SERPL-SCNC: 104 MMOL/L (ref 100–108)
CO2 SERPL-SCNC: 27 MMOL/L (ref 21–32)
CREAT SERPL-MCNC: 2.15 MG/DL (ref 0.6–1.3)
GFR SERPL CREATININE-BSD FRML MDRD: 30 ML/MIN/1.73SQ M
GLUCOSE SERPL-MCNC: 146 MG/DL (ref 65–140)
GLUCOSE SERPL-MCNC: 148 MG/DL (ref 65–140)
GLUCOSE SERPL-MCNC: 151 MG/DL (ref 65–140)
GLUCOSE SERPL-MCNC: 202 MG/DL (ref 65–140)
GLUCOSE SERPL-MCNC: 203 MG/DL (ref 65–140)
INR PPP: 1.79 (ref 0.84–1.19)
POTASSIUM SERPL-SCNC: 3.6 MMOL/L (ref 3.5–5.3)
PROTHROMBIN TIME: 20 SECONDS (ref 11.6–14.5)
SODIUM SERPL-SCNC: 137 MMOL/L (ref 136–145)

## 2021-10-13 PROCEDURE — 97110 THERAPEUTIC EXERCISES: CPT

## 2021-10-13 PROCEDURE — 99232 SBSQ HOSP IP/OBS MODERATE 35: CPT | Performed by: HOSPITALIST

## 2021-10-13 PROCEDURE — 80048 BASIC METABOLIC PNL TOTAL CA: CPT | Performed by: HOSPITALIST

## 2021-10-13 PROCEDURE — 85610 PROTHROMBIN TIME: CPT | Performed by: HOSPITALIST

## 2021-10-13 PROCEDURE — 99232 SBSQ HOSP IP/OBS MODERATE 35: CPT | Performed by: INTERNAL MEDICINE

## 2021-10-13 PROCEDURE — 82948 REAGENT STRIP/BLOOD GLUCOSE: CPT

## 2021-10-13 RX ORDER — WARFARIN SODIUM 3 MG/1
3 TABLET ORAL
Status: COMPLETED | OUTPATIENT
Start: 2021-10-13 | End: 2021-10-13

## 2021-10-13 RX ORDER — TORSEMIDE 20 MG/1
40 TABLET ORAL 2 TIMES DAILY
Status: DISCONTINUED | OUTPATIENT
Start: 2021-10-13 | End: 2021-10-16 | Stop reason: HOSPADM

## 2021-10-13 RX ADMIN — Medication: at 16:17

## 2021-10-13 RX ADMIN — ATORVASTATIN CALCIUM 40 MG: 40 TABLET, FILM COATED ORAL at 16:16

## 2021-10-13 RX ADMIN — WARFARIN SODIUM 7 MG: 5 TABLET ORAL at 16:56

## 2021-10-13 RX ADMIN — INSULIN LISPRO 1 UNITS: 100 INJECTION, SOLUTION INTRAVENOUS; SUBCUTANEOUS at 23:04

## 2021-10-13 RX ADMIN — TORSEMIDE 40 MG: 20 TABLET ORAL at 16:16

## 2021-10-13 RX ADMIN — Medication 6 MG: at 21:54

## 2021-10-13 RX ADMIN — BUMETANIDE 2 MG: 0.25 INJECTION, SOLUTION INTRAMUSCULAR; INTRAVENOUS at 10:34

## 2021-10-13 RX ADMIN — FLUTICASONE FUROATE AND VILANTEROL TRIFENATATE 1 PUFF: 200; 25 POWDER RESPIRATORY (INHALATION) at 08:15

## 2021-10-13 RX ADMIN — ALLOPURINOL 100 MG: 100 TABLET ORAL at 08:14

## 2021-10-13 RX ADMIN — INSULIN GLARGINE 22 UNITS: 100 INJECTION, SOLUTION SUBCUTANEOUS at 21:54

## 2021-10-13 RX ADMIN — INSULIN LISPRO 1 UNITS: 100 INJECTION, SOLUTION INTRAVENOUS; SUBCUTANEOUS at 08:14

## 2021-10-13 RX ADMIN — Medication: at 08:15

## 2021-10-13 RX ADMIN — WARFARIN SODIUM 3 MG: 3 TABLET ORAL at 19:39

## 2021-10-13 RX ADMIN — METOPROLOL SUCCINATE 25 MG: 25 TABLET, FILM COATED, EXTENDED RELEASE ORAL at 08:15

## 2021-10-13 RX ADMIN — INSULIN LISPRO 1 UNITS: 100 INJECTION, SOLUTION INTRAVENOUS; SUBCUTANEOUS at 16:23

## 2021-10-14 LAB
ANION GAP SERPL CALCULATED.3IONS-SCNC: 6 MMOL/L (ref 4–13)
BUN SERPL-MCNC: 51 MG/DL (ref 5–25)
CALCIUM SERPL-MCNC: 9.5 MG/DL (ref 8.3–10.1)
CHLORIDE SERPL-SCNC: 107 MMOL/L (ref 100–108)
CO2 SERPL-SCNC: 25 MMOL/L (ref 21–32)
CREAT SERPL-MCNC: 1.96 MG/DL (ref 0.6–1.3)
GFR SERPL CREATININE-BSD FRML MDRD: 34 ML/MIN/1.73SQ M
GLUCOSE SERPL-MCNC: 152 MG/DL (ref 65–140)
GLUCOSE SERPL-MCNC: 161 MG/DL (ref 65–140)
GLUCOSE SERPL-MCNC: 167 MG/DL (ref 65–140)
GLUCOSE SERPL-MCNC: 180 MG/DL (ref 65–140)
GLUCOSE SERPL-MCNC: 217 MG/DL (ref 65–140)
INR PPP: 2.01 (ref 0.84–1.19)
PHOSPHATE SERPL-MCNC: 4.7 MG/DL (ref 2.3–4.1)
POTASSIUM SERPL-SCNC: 3.4 MMOL/L (ref 3.5–5.3)
PROTHROMBIN TIME: 21.8 SECONDS (ref 11.6–14.5)
SODIUM SERPL-SCNC: 138 MMOL/L (ref 136–145)

## 2021-10-14 PROCEDURE — 99232 SBSQ HOSP IP/OBS MODERATE 35: CPT | Performed by: HOSPITALIST

## 2021-10-14 PROCEDURE — 99232 SBSQ HOSP IP/OBS MODERATE 35: CPT | Performed by: INTERNAL MEDICINE

## 2021-10-14 PROCEDURE — 80048 BASIC METABOLIC PNL TOTAL CA: CPT | Performed by: HOSPITALIST

## 2021-10-14 PROCEDURE — 85610 PROTHROMBIN TIME: CPT | Performed by: HOSPITALIST

## 2021-10-14 PROCEDURE — 82948 REAGENT STRIP/BLOOD GLUCOSE: CPT

## 2021-10-14 PROCEDURE — 84100 ASSAY OF PHOSPHORUS: CPT | Performed by: INTERNAL MEDICINE

## 2021-10-14 RX ORDER — WARFARIN SODIUM 5 MG/1
10 TABLET ORAL
Status: COMPLETED | OUTPATIENT
Start: 2021-10-14 | End: 2021-10-14

## 2021-10-14 RX ORDER — POTASSIUM CHLORIDE 20 MEQ/1
40 TABLET, EXTENDED RELEASE ORAL ONCE
Status: COMPLETED | OUTPATIENT
Start: 2021-10-14 | End: 2021-10-14

## 2021-10-14 RX ADMIN — ALLOPURINOL 100 MG: 100 TABLET ORAL at 07:34

## 2021-10-14 RX ADMIN — Medication: at 16:46

## 2021-10-14 RX ADMIN — ATORVASTATIN CALCIUM 40 MG: 40 TABLET, FILM COATED ORAL at 16:45

## 2021-10-14 RX ADMIN — INSULIN LISPRO 1 UNITS: 100 INJECTION, SOLUTION INTRAVENOUS; SUBCUTANEOUS at 11:02

## 2021-10-14 RX ADMIN — TORSEMIDE 40 MG: 20 TABLET ORAL at 07:34

## 2021-10-14 RX ADMIN — Medication 6 MG: at 22:00

## 2021-10-14 RX ADMIN — FLUTICASONE FUROATE AND VILANTEROL TRIFENATATE 1 PUFF: 200; 25 POWDER RESPIRATORY (INHALATION) at 07:32

## 2021-10-14 RX ADMIN — INSULIN GLARGINE 22 UNITS: 100 INJECTION, SOLUTION SUBCUTANEOUS at 22:00

## 2021-10-14 RX ADMIN — INSULIN LISPRO 2 UNITS: 100 INJECTION, SOLUTION INTRAVENOUS; SUBCUTANEOUS at 22:00

## 2021-10-14 RX ADMIN — INSULIN LISPRO 1 UNITS: 100 INJECTION, SOLUTION INTRAVENOUS; SUBCUTANEOUS at 16:54

## 2021-10-14 RX ADMIN — Medication: at 07:32

## 2021-10-14 RX ADMIN — TORSEMIDE 40 MG: 20 TABLET ORAL at 16:46

## 2021-10-14 RX ADMIN — WARFARIN SODIUM 10 MG: 5 TABLET ORAL at 16:45

## 2021-10-14 RX ADMIN — INSULIN LISPRO 1 UNITS: 100 INJECTION, SOLUTION INTRAVENOUS; SUBCUTANEOUS at 07:29

## 2021-10-14 RX ADMIN — POTASSIUM CHLORIDE 40 MEQ: 1500 TABLET, EXTENDED RELEASE ORAL at 15:49

## 2021-10-15 ENCOUNTER — APPOINTMENT (INPATIENT)
Dept: RADIOLOGY | Facility: HOSPITAL | Age: 71
DRG: 291 | End: 2021-10-15
Payer: COMMERCIAL

## 2021-10-15 LAB
ANION GAP SERPL CALCULATED.3IONS-SCNC: 5 MMOL/L (ref 4–13)
BASOPHILS # BLD AUTO: 0.07 THOUSANDS/ΜL (ref 0–0.1)
BASOPHILS NFR BLD AUTO: 1 % (ref 0–1)
BUN SERPL-MCNC: 49 MG/DL (ref 5–25)
CALCIUM SERPL-MCNC: 9.4 MG/DL (ref 8.3–10.1)
CHLORIDE SERPL-SCNC: 107 MMOL/L (ref 100–108)
CO2 SERPL-SCNC: 27 MMOL/L (ref 21–32)
CREAT SERPL-MCNC: 1.8 MG/DL (ref 0.6–1.3)
EOSINOPHIL # BLD AUTO: 0.21 THOUSAND/ΜL (ref 0–0.61)
EOSINOPHIL NFR BLD AUTO: 4 % (ref 0–6)
ERYTHROCYTE [DISTWIDTH] IN BLOOD BY AUTOMATED COUNT: 16 % (ref 11.6–15.1)
GFR SERPL CREATININE-BSD FRML MDRD: 38 ML/MIN/1.73SQ M
GLUCOSE SERPL-MCNC: 131 MG/DL (ref 65–140)
GLUCOSE SERPL-MCNC: 139 MG/DL (ref 65–140)
GLUCOSE SERPL-MCNC: 153 MG/DL (ref 65–140)
GLUCOSE SERPL-MCNC: 160 MG/DL (ref 65–140)
GLUCOSE SERPL-MCNC: 166 MG/DL (ref 65–140)
GLUCOSE SERPL-MCNC: 198 MG/DL (ref 65–140)
HCT VFR BLD AUTO: 36.8 % (ref 36.5–49.3)
HGB BLD-MCNC: 11.1 G/DL (ref 12–17)
IMM GRANULOCYTES # BLD AUTO: 0.03 THOUSAND/UL (ref 0–0.2)
IMM GRANULOCYTES NFR BLD AUTO: 1 % (ref 0–2)
INR PPP: 2.32 (ref 0.84–1.19)
LYMPHOCYTES # BLD AUTO: 1.35 THOUSANDS/ΜL (ref 0.6–4.47)
LYMPHOCYTES NFR BLD AUTO: 24 % (ref 14–44)
MCH RBC QN AUTO: 25.9 PG (ref 26.8–34.3)
MCHC RBC AUTO-ENTMCNC: 30.2 G/DL (ref 31.4–37.4)
MCV RBC AUTO: 86 FL (ref 82–98)
MONOCYTES # BLD AUTO: 0.58 THOUSAND/ΜL (ref 0.17–1.22)
MONOCYTES NFR BLD AUTO: 11 % (ref 4–12)
NEUTROPHILS # BLD AUTO: 3.3 THOUSANDS/ΜL (ref 1.85–7.62)
NEUTS SEG NFR BLD AUTO: 59 % (ref 43–75)
NRBC BLD AUTO-RTO: 0 /100 WBCS
NUC STRESS EJECTION FRACTION: 36 %
PHOSPHATE SERPL-MCNC: 3.9 MG/DL (ref 2.3–4.1)
PLATELET # BLD AUTO: 349 THOUSANDS/UL (ref 149–390)
PMV BLD AUTO: 9.8 FL (ref 8.9–12.7)
POTASSIUM SERPL-SCNC: 3.6 MMOL/L (ref 3.5–5.3)
PROTHROMBIN TIME: 24.4 SECONDS (ref 11.6–14.5)
RBC # BLD AUTO: 4.28 MILLION/UL (ref 3.88–5.62)
SL CV REST NUCLEAR ISOTOPE DOSE: 16.5 MCI
SL CV STRESS NUCLEAR ISOTOPE DOSE: 49.5 MCI
SL CV STRESS RECOVERY BP: NORMAL MMHG
SL CV STRESS RECOVERY HR: 77 BPM
SL CV STRESS RECOVERY O2 SAT: 97 %
SODIUM SERPL-SCNC: 139 MMOL/L (ref 136–145)
STRESS ANGINA INDEX: 0
STRESS BASELINE BP: NORMAL MMHG
STRESS BASELINE HR: 67 BPM
STRESS DUKE TREADMILL SCORE: 3
STRESS O2 SAT REST: 94 %
STRESS PEAK HR: 83 BPM
STRESS PERCENT HR: 54 %
STRESS POST EXERCISE DUR MIN: 3 MIN
STRESS POST O2 SAT PEAK: 93 %
STRESS POST PEAK BP: NORMAL MMHG
STRESS ST DEPRESSION: 0 MM
STRESS TARGET HR: 83 BPM
STRESS/REST PERFUSION RATIO: 0.99
WBC # BLD AUTO: 5.54 THOUSAND/UL (ref 4.31–10.16)

## 2021-10-15 PROCEDURE — G1004 CDSM NDSC: HCPCS

## 2021-10-15 PROCEDURE — 99232 SBSQ HOSP IP/OBS MODERATE 35: CPT | Performed by: INTERNAL MEDICINE

## 2021-10-15 PROCEDURE — 93016 CV STRESS TEST SUPVJ ONLY: CPT | Performed by: INTERNAL MEDICINE

## 2021-10-15 PROCEDURE — 93018 CV STRESS TEST I&R ONLY: CPT | Performed by: INTERNAL MEDICINE

## 2021-10-15 PROCEDURE — 82948 REAGENT STRIP/BLOOD GLUCOSE: CPT

## 2021-10-15 PROCEDURE — 78452 HT MUSCLE IMAGE SPECT MULT: CPT

## 2021-10-15 PROCEDURE — 93017 CV STRESS TEST TRACING ONLY: CPT

## 2021-10-15 PROCEDURE — 99232 SBSQ HOSP IP/OBS MODERATE 35: CPT | Performed by: HOSPITALIST

## 2021-10-15 PROCEDURE — 85610 PROTHROMBIN TIME: CPT | Performed by: HOSPITALIST

## 2021-10-15 PROCEDURE — A9502 TC99M TETROFOSMIN: HCPCS

## 2021-10-15 PROCEDURE — 78452 HT MUSCLE IMAGE SPECT MULT: CPT | Performed by: INTERNAL MEDICINE

## 2021-10-15 PROCEDURE — 85025 COMPLETE CBC W/AUTO DIFF WBC: CPT | Performed by: HOSPITALIST

## 2021-10-15 PROCEDURE — 80048 BASIC METABOLIC PNL TOTAL CA: CPT | Performed by: INTERNAL MEDICINE

## 2021-10-15 PROCEDURE — 84100 ASSAY OF PHOSPHORUS: CPT | Performed by: INTERNAL MEDICINE

## 2021-10-15 RX ORDER — WARFARIN SODIUM 7.5 MG/1
7.5 TABLET ORAL
Status: COMPLETED | OUTPATIENT
Start: 2021-10-15 | End: 2021-10-15

## 2021-10-15 RX ORDER — AMINOPHYLLINE DIHYDRATE 25 MG/ML
INJECTION, SOLUTION INTRAVENOUS
Status: DISCONTINUED
Start: 2021-10-15 | End: 2021-10-15 | Stop reason: WASHOUT

## 2021-10-15 RX ADMIN — ALLOPURINOL 100 MG: 100 TABLET ORAL at 08:24

## 2021-10-15 RX ADMIN — INSULIN LISPRO 1 UNITS: 100 INJECTION, SOLUTION INTRAVENOUS; SUBCUTANEOUS at 18:24

## 2021-10-15 RX ADMIN — ATORVASTATIN CALCIUM 40 MG: 40 TABLET, FILM COATED ORAL at 17:22

## 2021-10-15 RX ADMIN — REGADENOSON 0.4 MG: 0.08 INJECTION, SOLUTION INTRAVENOUS at 14:33

## 2021-10-15 RX ADMIN — INSULIN GLARGINE 22 UNITS: 100 INJECTION, SOLUTION SUBCUTANEOUS at 21:53

## 2021-10-15 RX ADMIN — DOCUSATE SODIUM 100 MG: 100 CAPSULE ORAL at 17:25

## 2021-10-15 RX ADMIN — INSULIN LISPRO 1 UNITS: 100 INJECTION, SOLUTION INTRAVENOUS; SUBCUTANEOUS at 08:28

## 2021-10-15 RX ADMIN — INSULIN LISPRO 1 UNITS: 100 INJECTION, SOLUTION INTRAVENOUS; SUBCUTANEOUS at 11:55

## 2021-10-15 RX ADMIN — Medication 1 APPLICATION: at 17:24

## 2021-10-15 RX ADMIN — TORSEMIDE 40 MG: 20 TABLET ORAL at 08:23

## 2021-10-15 RX ADMIN — WARFARIN SODIUM 7.5 MG: 7.5 TABLET ORAL at 17:23

## 2021-10-15 RX ADMIN — INSULIN LISPRO 1 UNITS: 100 INJECTION, SOLUTION INTRAVENOUS; SUBCUTANEOUS at 21:53

## 2021-10-15 RX ADMIN — Medication: at 08:27

## 2021-10-15 RX ADMIN — TORSEMIDE 40 MG: 20 TABLET ORAL at 17:23

## 2021-10-15 RX ADMIN — Medication 6 MG: at 21:52

## 2021-10-15 RX ADMIN — POLYETHYLENE GLYCOL 3350 17 G: 17 POWDER, FOR SOLUTION ORAL at 08:22

## 2021-10-15 RX ADMIN — DOCUSATE SODIUM 100 MG: 100 CAPSULE ORAL at 08:23

## 2021-10-15 RX ADMIN — FLUTICASONE FUROATE AND VILANTEROL TRIFENATATE 1 PUFF: 200; 25 POWDER RESPIRATORY (INHALATION) at 08:27

## 2021-10-16 VITALS
BODY MASS INDEX: 37.12 KG/M2 | OXYGEN SATURATION: 92 % | SYSTOLIC BLOOD PRESSURE: 127 MMHG | TEMPERATURE: 97.8 F | WEIGHT: 298.5 LBS | HEART RATE: 59 BPM | RESPIRATION RATE: 20 BRPM | HEIGHT: 75 IN | DIASTOLIC BLOOD PRESSURE: 59 MMHG

## 2021-10-16 LAB
ANION GAP SERPL CALCULATED.3IONS-SCNC: 5 MMOL/L (ref 4–13)
BUN SERPL-MCNC: 46 MG/DL (ref 5–25)
CALCIUM SERPL-MCNC: 9.1 MG/DL (ref 8.3–10.1)
CHLORIDE SERPL-SCNC: 109 MMOL/L (ref 100–108)
CO2 SERPL-SCNC: 24 MMOL/L (ref 21–32)
CREAT SERPL-MCNC: 1.7 MG/DL (ref 0.6–1.3)
FLUAV RNA RESP QL NAA+PROBE: NEGATIVE
FLUBV RNA RESP QL NAA+PROBE: NEGATIVE
GFR SERPL CREATININE-BSD FRML MDRD: 40 ML/MIN/1.73SQ M
GLUCOSE SERPL-MCNC: 139 MG/DL (ref 65–140)
GLUCOSE SERPL-MCNC: 156 MG/DL (ref 65–140)
GLUCOSE SERPL-MCNC: 245 MG/DL (ref 65–140)
INR PPP: 2.86 (ref 0.84–1.19)
POTASSIUM SERPL-SCNC: 3.7 MMOL/L (ref 3.5–5.3)
PROTHROMBIN TIME: 28.5 SECONDS (ref 11.6–14.5)
RSV RNA RESP QL NAA+PROBE: NEGATIVE
SARS-COV-2 RNA RESP QL NAA+PROBE: NEGATIVE
SODIUM SERPL-SCNC: 138 MMOL/L (ref 136–145)

## 2021-10-16 PROCEDURE — 99232 SBSQ HOSP IP/OBS MODERATE 35: CPT | Performed by: INTERNAL MEDICINE

## 2021-10-16 PROCEDURE — 80048 BASIC METABOLIC PNL TOTAL CA: CPT | Performed by: INTERNAL MEDICINE

## 2021-10-16 PROCEDURE — 0241U HB NFCT DS VIR RESP RNA 4 TRGT: CPT | Performed by: HOSPITALIST

## 2021-10-16 PROCEDURE — 85610 PROTHROMBIN TIME: CPT | Performed by: HOSPITALIST

## 2021-10-16 PROCEDURE — 99239 HOSP IP/OBS DSCHRG MGMT >30: CPT | Performed by: HOSPITALIST

## 2021-10-16 PROCEDURE — 82948 REAGENT STRIP/BLOOD GLUCOSE: CPT

## 2021-10-16 RX ORDER — INSULIN GLARGINE 100 [IU]/ML
22 INJECTION, SOLUTION SUBCUTANEOUS DAILY
Refills: 0 | Status: ON HOLD
Start: 2021-10-16 | End: 2022-08-10 | Stop reason: SDUPTHER

## 2021-10-16 RX ORDER — POLYETHYLENE GLYCOL 3350 17 G/17G
17 POWDER, FOR SOLUTION ORAL DAILY
Refills: 0
Start: 2021-10-17 | End: 2022-08-10

## 2021-10-16 RX ORDER — METOPROLOL SUCCINATE 25 MG/1
25 TABLET, EXTENDED RELEASE ORAL DAILY
Refills: 0
Start: 2021-10-17 | End: 2022-08-10

## 2021-10-16 RX ORDER — POTASSIUM CHLORIDE 20 MEQ/1
40 TABLET, EXTENDED RELEASE ORAL DAILY
Refills: 0
Start: 2021-10-16

## 2021-10-16 RX ORDER — POTASSIUM CHLORIDE 20 MEQ/1
40 TABLET, EXTENDED RELEASE ORAL DAILY
Status: DISCONTINUED | OUTPATIENT
Start: 2021-10-16 | End: 2021-10-16 | Stop reason: HOSPADM

## 2021-10-16 RX ORDER — DOCUSATE SODIUM 100 MG/1
100 CAPSULE, LIQUID FILLED ORAL 2 TIMES DAILY
Refills: 0
Start: 2021-10-16 | End: 2022-08-10

## 2021-10-16 RX ORDER — TORSEMIDE 20 MG/1
40 TABLET ORAL 2 TIMES DAILY
Refills: 0
Start: 2021-10-16

## 2021-10-16 RX ADMIN — INSULIN LISPRO 2 UNITS: 100 INJECTION, SOLUTION INTRAVENOUS; SUBCUTANEOUS at 11:58

## 2021-10-16 RX ADMIN — METOPROLOL SUCCINATE 25 MG: 25 TABLET, FILM COATED, EXTENDED RELEASE ORAL at 08:21

## 2021-10-16 RX ADMIN — INSULIN LISPRO 1 UNITS: 100 INJECTION, SOLUTION INTRAVENOUS; SUBCUTANEOUS at 06:49

## 2021-10-16 RX ADMIN — TORSEMIDE 40 MG: 20 TABLET ORAL at 08:22

## 2021-10-16 RX ADMIN — Medication: at 08:28

## 2021-10-16 RX ADMIN — POTASSIUM CHLORIDE 40 MEQ: 1500 TABLET, EXTENDED RELEASE ORAL at 12:06

## 2021-10-16 RX ADMIN — ALLOPURINOL 100 MG: 100 TABLET ORAL at 08:22

## 2021-10-16 RX ADMIN — DOCUSATE SODIUM 100 MG: 100 CAPSULE ORAL at 08:21

## 2021-10-16 RX ADMIN — FLUTICASONE FUROATE AND VILANTEROL TRIFENATATE 1 PUFF: 200; 25 POWDER RESPIRATORY (INHALATION) at 11:57

## 2021-10-16 RX ADMIN — POLYETHYLENE GLYCOL 3350 17 G: 17 POWDER, FOR SOLUTION ORAL at 08:22

## 2021-10-18 LAB
CHEST PAIN STATEMENT: NORMAL
MAX DIASTOLIC BP: 76 MMHG
MAX HEART RATE: 83 BPM
MAX PREDICTED HEART RATE: 151 BPM
MAX. SYSTOLIC BP: 140 MMHG
PROTOCOL NAME: NORMAL
REASON FOR TERMINATION: NORMAL
TARGET HR FORMULA: NORMAL
TEST INDICATION: NORMAL
TIME IN EXERCISE PHASE: NORMAL

## 2021-10-20 ENCOUNTER — TELEPHONE (OUTPATIENT)
Dept: HEMATOLOGY ONCOLOGY | Facility: CLINIC | Age: 71
End: 2021-10-20

## 2021-10-20 LAB — INR PPP: 3.9 (ref 0.84–1.19)

## 2021-10-21 ENCOUNTER — ANTICOAG VISIT (OUTPATIENT)
Dept: CARDIOLOGY CLINIC | Facility: CLINIC | Age: 71
End: 2021-10-21

## 2021-10-21 DIAGNOSIS — C90.00 MULTIPLE MYELOMA NOT HAVING ACHIEVED REMISSION (HCC): Primary | ICD-10-CM

## 2021-10-21 DIAGNOSIS — I48.20 CHRONIC ATRIAL FIBRILLATION (HCC): Primary | ICD-10-CM

## 2021-10-25 ENCOUNTER — APPOINTMENT (OUTPATIENT)
Dept: LAB | Facility: CLINIC | Age: 71
End: 2021-10-25
Payer: COMMERCIAL

## 2021-10-25 ENCOUNTER — ANTICOAG VISIT (OUTPATIENT)
Dept: CARDIOLOGY CLINIC | Facility: CLINIC | Age: 71
End: 2021-10-25

## 2021-10-25 DIAGNOSIS — I48.20 CHRONIC ATRIAL FIBRILLATION (HCC): Primary | ICD-10-CM

## 2021-10-26 ENCOUNTER — APPOINTMENT (EMERGENCY)
Dept: RADIOLOGY | Facility: HOSPITAL | Age: 71
End: 2021-10-26
Payer: COMMERCIAL

## 2021-10-26 ENCOUNTER — HOSPITAL ENCOUNTER (EMERGENCY)
Facility: HOSPITAL | Age: 71
Discharge: HOME | End: 2021-10-26
Attending: EMERGENCY MEDICINE | Admitting: EMERGENCY MEDICINE
Payer: COMMERCIAL

## 2021-10-26 VITALS
TEMPERATURE: 98.8 F | WEIGHT: 309.08 LBS | BODY MASS INDEX: 38.63 KG/M2 | RESPIRATION RATE: 18 BRPM | HEART RATE: 77 BPM | SYSTOLIC BLOOD PRESSURE: 136 MMHG | DIASTOLIC BLOOD PRESSURE: 59 MMHG | OXYGEN SATURATION: 96 %

## 2021-10-26 DIAGNOSIS — M10.9 GOUT OF LEFT WRIST: Primary | ICD-10-CM

## 2021-10-26 PROCEDURE — 99284 EMERGENCY DEPT VISIT MOD MDM: CPT

## 2021-10-26 PROCEDURE — 29125 APPL SHORT ARM SPLINT STATIC: CPT | Performed by: EMERGENCY MEDICINE

## 2021-10-26 PROCEDURE — 73110 X-RAY EXAM OF WRIST: CPT

## 2021-10-26 PROCEDURE — 99284 EMERGENCY DEPT VISIT MOD MDM: CPT | Performed by: EMERGENCY MEDICINE

## 2021-10-26 RX ORDER — COLCHICINE 0.6 MG/1
0.6 TABLET ORAL ONCE
Status: COMPLETED | OUTPATIENT
Start: 2021-10-26 | End: 2021-10-26

## 2021-10-26 RX ORDER — OXYCODONE HYDROCHLORIDE AND ACETAMINOPHEN 5; 325 MG/1; MG/1
1 TABLET ORAL ONCE
Status: COMPLETED | OUTPATIENT
Start: 2021-10-26 | End: 2021-10-26

## 2021-10-26 RX ORDER — DEXAMETHASONE 4 MG/1
20 TABLET ORAL ONCE
Status: CANCELLED
Start: 2021-11-02 | End: 2021-11-02

## 2021-10-26 RX ORDER — COLCHICINE 0.6 MG/1
0.6 TABLET ORAL DAILY
Qty: 2 TABLET | Refills: 0 | Status: SHIPPED | OUTPATIENT
Start: 2021-10-26 | End: 2022-08-10

## 2021-10-26 RX ADMIN — OXYCODONE HYDROCHLORIDE AND ACETAMINOPHEN 1 TABLET: 5; 325 TABLET ORAL at 08:40

## 2021-10-26 RX ADMIN — COLCHICINE 0.6 MG: 0.6 TABLET ORAL at 11:54

## 2021-10-26 RX ADMIN — PREDNISONE 50 MG: 20 TABLET ORAL at 11:54

## 2021-10-28 ENCOUNTER — LAB (OUTPATIENT)
Dept: LAB | Facility: CLINIC | Age: 71
End: 2021-10-28
Payer: COMMERCIAL

## 2021-10-28 DIAGNOSIS — N17.9 AKI (ACUTE KIDNEY INJURY) (HCC): ICD-10-CM

## 2021-10-28 DIAGNOSIS — C90.00 MULTIPLE MYELOMA NOT HAVING ACHIEVED REMISSION (HCC): ICD-10-CM

## 2021-10-28 DIAGNOSIS — I50.33 ACUTE ON CHRONIC DIASTOLIC CONGESTIVE HEART FAILURE (HCC): ICD-10-CM

## 2021-10-28 LAB
ALBUMIN SERPL BCP-MCNC: 3 G/DL (ref 3.5–5)
ALP SERPL-CCNC: 128 U/L (ref 46–116)
ALT SERPL W P-5'-P-CCNC: 13 U/L (ref 12–78)
ANION GAP SERPL CALCULATED.3IONS-SCNC: 6 MMOL/L (ref 4–13)
AST SERPL W P-5'-P-CCNC: 14 U/L (ref 5–45)
BASOPHILS # BLD AUTO: 0.06 THOUSANDS/ΜL (ref 0–0.1)
BASOPHILS NFR BLD AUTO: 1 % (ref 0–1)
BILIRUB SERPL-MCNC: 0.51 MG/DL (ref 0.2–1)
BUN SERPL-MCNC: 33 MG/DL (ref 5–25)
CALCIUM ALBUM COR SERPL-MCNC: 10.1 MG/DL (ref 8.3–10.1)
CALCIUM SERPL-MCNC: 9.3 MG/DL (ref 8.3–10.1)
CHLORIDE SERPL-SCNC: 108 MMOL/L (ref 100–108)
CO2 SERPL-SCNC: 28 MMOL/L (ref 21–32)
CREAT SERPL-MCNC: 1.42 MG/DL (ref 0.6–1.3)
EOSINOPHIL # BLD AUTO: 0.16 THOUSAND/ΜL (ref 0–0.61)
EOSINOPHIL NFR BLD AUTO: 3 % (ref 0–6)
ERYTHROCYTE [DISTWIDTH] IN BLOOD BY AUTOMATED COUNT: 17 % (ref 11.6–15.1)
GFR SERPL CREATININE-BSD FRML MDRD: 50 ML/MIN/1.73SQ M
GLUCOSE SERPL-MCNC: 122 MG/DL (ref 65–140)
HCT VFR BLD AUTO: 37.4 % (ref 36.5–49.3)
HGB BLD-MCNC: 11.2 G/DL (ref 12–17)
IMM GRANULOCYTES # BLD AUTO: 0.02 THOUSAND/UL (ref 0–0.2)
IMM GRANULOCYTES NFR BLD AUTO: 0 % (ref 0–2)
LYMPHOCYTES # BLD AUTO: 1.43 THOUSANDS/ΜL (ref 0.6–4.47)
LYMPHOCYTES NFR BLD AUTO: 23 % (ref 14–44)
MCH RBC QN AUTO: 26.2 PG (ref 26.8–34.3)
MCHC RBC AUTO-ENTMCNC: 29.9 G/DL (ref 31.4–37.4)
MCV RBC AUTO: 87 FL (ref 82–98)
MONOCYTES # BLD AUTO: 0.52 THOUSAND/ΜL (ref 0.17–1.22)
MONOCYTES NFR BLD AUTO: 8 % (ref 4–12)
NEUTROPHILS # BLD AUTO: 4.06 THOUSANDS/ΜL (ref 1.85–7.62)
NEUTS SEG NFR BLD AUTO: 65 % (ref 43–75)
NRBC BLD AUTO-RTO: 0 /100 WBCS
PLATELET # BLD AUTO: 237 THOUSANDS/UL (ref 149–390)
PMV BLD AUTO: 10.6 FL (ref 8.9–12.7)
POTASSIUM SERPL-SCNC: 3.8 MMOL/L (ref 3.5–5.3)
PROT SERPL-MCNC: 7.6 G/DL (ref 6.4–8.2)
RBC # BLD AUTO: 4.28 MILLION/UL (ref 3.88–5.62)
SODIUM SERPL-SCNC: 142 MMOL/L (ref 136–145)
WBC # BLD AUTO: 6.25 THOUSAND/UL (ref 4.31–10.16)

## 2021-10-28 PROCEDURE — 85025 COMPLETE CBC W/AUTO DIFF WBC: CPT

## 2021-10-28 PROCEDURE — 80053 COMPREHEN METABOLIC PANEL: CPT

## 2021-10-28 PROCEDURE — 36415 COLL VENOUS BLD VENIPUNCTURE: CPT

## 2021-10-29 ENCOUNTER — ANTICOAG VISIT (OUTPATIENT)
Dept: CARDIOLOGY CLINIC | Facility: CLINIC | Age: 71
End: 2021-10-29

## 2021-10-29 DIAGNOSIS — I48.20 CHRONIC ATRIAL FIBRILLATION (HCC): Primary | ICD-10-CM

## 2021-11-01 ENCOUNTER — ANTICOAG VISIT (OUTPATIENT)
Dept: CARDIOLOGY CLINIC | Facility: CLINIC | Age: 71
End: 2021-11-01

## 2021-11-01 ENCOUNTER — APPOINTMENT (OUTPATIENT)
Dept: LAB | Facility: CLINIC | Age: 71
End: 2021-11-01
Payer: COMMERCIAL

## 2021-11-01 DIAGNOSIS — I48.20 CHRONIC ATRIAL FIBRILLATION (HCC): Primary | ICD-10-CM

## 2021-11-02 ENCOUNTER — HOSPITAL ENCOUNTER (OUTPATIENT)
Dept: INFUSION CENTER | Facility: HOSPITAL | Age: 71
Discharge: HOME | End: 2021-11-02
Attending: INTERNAL MEDICINE
Payer: COMMERCIAL

## 2021-11-02 VITALS
DIASTOLIC BLOOD PRESSURE: 71 MMHG | RESPIRATION RATE: 18 BRPM | OXYGEN SATURATION: 99 % | BODY MASS INDEX: 38.43 KG/M2 | WEIGHT: 309.08 LBS | HEIGHT: 75 IN | SYSTOLIC BLOOD PRESSURE: 136 MMHG | HEART RATE: 74 BPM | TEMPERATURE: 96.2 F

## 2021-11-02 DIAGNOSIS — C90.00 MULTIPLE MYELOMA NOT HAVING ACHIEVED REMISSION (HCC): Primary | ICD-10-CM

## 2021-11-02 PROCEDURE — 96401 CHEMO ANTI-NEOPL SQ/IM: CPT

## 2021-11-02 RX ORDER — DEXAMETHASONE 4 MG/1
20 TABLET ORAL ONCE
Status: COMPLETED | OUTPATIENT
Start: 2021-11-02 | End: 2021-11-02

## 2021-11-02 RX ADMIN — BORTEZOMIB 3.4 MG: 3.5 INJECTION, POWDER, LYOPHILIZED, FOR SOLUTION INTRAVENOUS; SUBCUTANEOUS at 12:05

## 2021-11-02 RX ADMIN — DEXAMETHASONE 20 MG: 4 TABLET ORAL at 11:34

## 2021-11-04 RX ORDER — DEXAMETHASONE 4 MG/1
20 TABLET ORAL ONCE
Status: CANCELLED
Start: 2021-11-09 | End: 2021-11-09

## 2021-11-05 LAB — INR PPP: 2.6 (ref 0.84–1.19)

## 2021-11-08 ENCOUNTER — ANTICOAG VISIT (OUTPATIENT)
Dept: CARDIOLOGY CLINIC | Facility: CLINIC | Age: 71
End: 2021-11-08

## 2021-11-08 ENCOUNTER — OFFICE VISIT (OUTPATIENT)
Dept: HEMATOLOGY ONCOLOGY | Facility: HOSPITAL | Age: 71
End: 2021-11-08
Payer: COMMERCIAL

## 2021-11-08 VITALS
OXYGEN SATURATION: 99 % | RESPIRATION RATE: 16 BRPM | DIASTOLIC BLOOD PRESSURE: 70 MMHG | SYSTOLIC BLOOD PRESSURE: 126 MMHG | TEMPERATURE: 97.5 F | HEART RATE: 79 BPM

## 2021-11-08 DIAGNOSIS — I48.20 CHRONIC ATRIAL FIBRILLATION (HCC): Primary | ICD-10-CM

## 2021-11-08 DIAGNOSIS — C90.00 MULTIPLE MYELOMA NOT HAVING ACHIEVED REMISSION (HCC): Primary | ICD-10-CM

## 2021-11-08 PROCEDURE — 99214 OFFICE O/P EST MOD 30 MIN: CPT | Performed by: NURSE PRACTITIONER

## 2021-11-09 ENCOUNTER — HOSPITAL ENCOUNTER (OUTPATIENT)
Dept: INFUSION CENTER | Facility: HOSPITAL | Age: 71
Discharge: HOME | End: 2021-11-09
Attending: INTERNAL MEDICINE
Payer: COMMERCIAL

## 2021-11-09 VITALS
HEART RATE: 69 BPM | RESPIRATION RATE: 16 BRPM | TEMPERATURE: 96.4 F | WEIGHT: 315 LBS | HEIGHT: 75 IN | DIASTOLIC BLOOD PRESSURE: 60 MMHG | BODY MASS INDEX: 39.17 KG/M2 | OXYGEN SATURATION: 98 % | SYSTOLIC BLOOD PRESSURE: 134 MMHG

## 2021-11-09 DIAGNOSIS — C90.00 MULTIPLE MYELOMA NOT HAVING ACHIEVED REMISSION (HCC): Primary | ICD-10-CM

## 2021-11-09 PROCEDURE — 96401 CHEMO ANTI-NEOPL SQ/IM: CPT

## 2021-11-09 RX ORDER — DEXAMETHASONE 4 MG/1
20 TABLET ORAL ONCE
Status: CANCELLED
Start: 2021-11-16 | End: 2021-11-16

## 2021-11-09 RX ORDER — DEXAMETHASONE 4 MG/1
20 TABLET ORAL ONCE
Status: COMPLETED | OUTPATIENT
Start: 2021-11-09 | End: 2021-11-09

## 2021-11-09 RX ORDER — DEXAMETHASONE 4 MG/1
20 TABLET ORAL ONCE
Status: CANCELLED
Start: 2021-11-23 | End: 2021-11-23

## 2021-11-09 RX ADMIN — BORTEZOMIB 3.4 MG: 3.5 INJECTION, POWDER, LYOPHILIZED, FOR SOLUTION INTRAVENOUS; SUBCUTANEOUS at 12:03

## 2021-11-09 RX ADMIN — DEXAMETHASONE 20 MG: 4 TABLET ORAL at 12:02

## 2021-11-16 ENCOUNTER — HOSPITAL ENCOUNTER (OUTPATIENT)
Dept: INFUSION CENTER | Facility: HOSPITAL | Age: 71
Discharge: HOME | End: 2021-11-16
Attending: INTERNAL MEDICINE
Payer: COMMERCIAL

## 2021-11-16 VITALS
BODY MASS INDEX: 37.05 KG/M2 | WEIGHT: 298 LBS | RESPIRATION RATE: 18 BRPM | TEMPERATURE: 96.9 F | SYSTOLIC BLOOD PRESSURE: 140 MMHG | HEIGHT: 75 IN | HEART RATE: 70 BPM | OXYGEN SATURATION: 100 % | DIASTOLIC BLOOD PRESSURE: 65 MMHG

## 2021-11-16 DIAGNOSIS — C90.00 MULTIPLE MYELOMA NOT HAVING ACHIEVED REMISSION (HCC): Primary | ICD-10-CM

## 2021-11-16 PROCEDURE — 96401 CHEMO ANTI-NEOPL SQ/IM: CPT

## 2021-11-16 RX ORDER — DEXAMETHASONE 4 MG/1
20 TABLET ORAL ONCE
Status: COMPLETED | OUTPATIENT
Start: 2021-11-16 | End: 2021-11-16

## 2021-11-16 RX ADMIN — DEXAMETHASONE 20 MG: 4 TABLET ORAL at 14:02

## 2021-11-16 RX ADMIN — BORTEZOMIB 3.4 MG: 3.5 INJECTION, POWDER, LYOPHILIZED, FOR SOLUTION INTRAVENOUS; SUBCUTANEOUS at 14:07

## 2021-11-17 LAB — INR PPP: 1.9 (ref 0.84–1.19)

## 2021-11-18 ENCOUNTER — ANTICOAG VISIT (OUTPATIENT)
Dept: CARDIOLOGY CLINIC | Facility: CLINIC | Age: 71
End: 2021-11-18

## 2021-11-18 DIAGNOSIS — I48.20 CHRONIC ATRIAL FIBRILLATION (HCC): Primary | ICD-10-CM

## 2021-11-23 ENCOUNTER — HOSPITAL ENCOUNTER (OUTPATIENT)
Dept: INFUSION CENTER | Facility: HOSPITAL | Age: 71
Discharge: HOME | End: 2021-11-23
Attending: INTERNAL MEDICINE
Payer: COMMERCIAL

## 2021-11-23 VITALS
HEIGHT: 75 IN | TEMPERATURE: 96.6 F | OXYGEN SATURATION: 99 % | DIASTOLIC BLOOD PRESSURE: 71 MMHG | HEART RATE: 63 BPM | WEIGHT: 299.8 LBS | SYSTOLIC BLOOD PRESSURE: 129 MMHG | BODY MASS INDEX: 37.28 KG/M2

## 2021-11-23 DIAGNOSIS — C90.00 MULTIPLE MYELOMA NOT HAVING ACHIEVED REMISSION (HCC): Primary | ICD-10-CM

## 2021-11-23 PROCEDURE — 96401 CHEMO ANTI-NEOPL SQ/IM: CPT

## 2021-11-23 RX ORDER — DEXAMETHASONE 4 MG/1
20 TABLET ORAL ONCE
Status: COMPLETED | OUTPATIENT
Start: 2021-11-23 | End: 2021-11-23

## 2021-11-23 RX ADMIN — DEXAMETHASONE 20 MG: 4 TABLET ORAL at 11:53

## 2021-11-23 RX ADMIN — BORTEZOMIB 3.4 MG: 3.5 INJECTION, POWDER, LYOPHILIZED, FOR SOLUTION INTRAVENOUS; SUBCUTANEOUS at 12:11

## 2021-11-29 ENCOUNTER — OFFICE VISIT (OUTPATIENT)
Dept: CARDIOLOGY CLINIC | Facility: CLINIC | Age: 71
End: 2021-11-29
Payer: COMMERCIAL

## 2021-11-29 VITALS
DIASTOLIC BLOOD PRESSURE: 70 MMHG | HEIGHT: 75 IN | WEIGHT: 298 LBS | HEART RATE: 77 BPM | BODY MASS INDEX: 37.05 KG/M2 | SYSTOLIC BLOOD PRESSURE: 128 MMHG

## 2021-11-29 DIAGNOSIS — I42.9 CARDIOMYOPATHY, UNSPECIFIED TYPE (HCC): ICD-10-CM

## 2021-11-29 DIAGNOSIS — I48.20 CHRONIC ATRIAL FIBRILLATION (HCC): Primary | ICD-10-CM

## 2021-11-29 DIAGNOSIS — E78.00 PURE HYPERCHOLESTEROLEMIA: ICD-10-CM

## 2021-11-29 DIAGNOSIS — I10 ESSENTIAL HYPERTENSION: ICD-10-CM

## 2021-11-29 PROCEDURE — 1036F TOBACCO NON-USER: CPT | Performed by: INTERNAL MEDICINE

## 2021-11-29 PROCEDURE — 93000 ELECTROCARDIOGRAM COMPLETE: CPT | Performed by: INTERNAL MEDICINE

## 2021-11-29 PROCEDURE — 3008F BODY MASS INDEX DOCD: CPT | Performed by: INTERNAL MEDICINE

## 2021-11-29 PROCEDURE — 99214 OFFICE O/P EST MOD 30 MIN: CPT | Performed by: INTERNAL MEDICINE

## 2021-11-29 PROCEDURE — 1160F RVW MEDS BY RX/DR IN RCRD: CPT | Performed by: INTERNAL MEDICINE

## 2021-11-30 RX ORDER — DEXAMETHASONE 4 MG/1
20 TABLET ORAL ONCE
Status: CANCELLED
Start: 2021-12-28 | End: 2021-12-28

## 2021-11-30 RX ORDER — DEXAMETHASONE 4 MG/1
20 TABLET ORAL ONCE
Status: CANCELLED
Start: 2021-12-21 | End: 2021-12-21

## 2021-11-30 RX ORDER — DEXAMETHASONE 4 MG/1
20 TABLET ORAL ONCE
Status: CANCELLED
Start: 2021-12-07 | End: 2021-12-07

## 2021-11-30 RX ORDER — DEXAMETHASONE 4 MG/1
20 TABLET ORAL ONCE
Status: CANCELLED
Start: 2021-12-14 | End: 2021-12-14

## 2021-12-01 LAB — INR PPP: 2 (ref 0.84–1.19)

## 2021-12-02 ENCOUNTER — ANTICOAG VISIT (OUTPATIENT)
Dept: CARDIOLOGY CLINIC | Facility: CLINIC | Age: 71
End: 2021-12-02

## 2021-12-02 DIAGNOSIS — I48.20 CHRONIC ATRIAL FIBRILLATION (HCC): Primary | ICD-10-CM

## 2021-12-07 ENCOUNTER — HOSPITAL ENCOUNTER (OUTPATIENT)
Dept: INFUSION CENTER | Facility: HOSPITAL | Age: 71
Discharge: HOME | End: 2021-12-07
Attending: INTERNAL MEDICINE
Payer: COMMERCIAL

## 2021-12-07 VITALS
HEART RATE: 67 BPM | DIASTOLIC BLOOD PRESSURE: 60 MMHG | OXYGEN SATURATION: 98 % | SYSTOLIC BLOOD PRESSURE: 139 MMHG | TEMPERATURE: 97.9 F | BODY MASS INDEX: 36.93 KG/M2 | HEIGHT: 75 IN | WEIGHT: 297 LBS | RESPIRATION RATE: 18 BRPM

## 2021-12-07 DIAGNOSIS — C90.00 MULTIPLE MYELOMA NOT HAVING ACHIEVED REMISSION (HCC): Primary | ICD-10-CM

## 2021-12-07 PROCEDURE — 96401 CHEMO ANTI-NEOPL SQ/IM: CPT

## 2021-12-07 RX ORDER — DEXAMETHASONE 4 MG/1
20 TABLET ORAL ONCE
Status: COMPLETED | OUTPATIENT
Start: 2021-12-07 | End: 2021-12-07

## 2021-12-07 RX ADMIN — BORTEZOMIB 3.4 MG: 3.5 INJECTION, POWDER, LYOPHILIZED, FOR SOLUTION INTRAVENOUS; SUBCUTANEOUS at 11:51

## 2021-12-07 RX ADMIN — DEXAMETHASONE 20 MG: 4 TABLET ORAL at 11:13

## 2021-12-14 ENCOUNTER — HOSPITAL ENCOUNTER (OUTPATIENT)
Dept: INFUSION CENTER | Facility: HOSPITAL | Age: 71
Discharge: HOME | End: 2021-12-14
Attending: INTERNAL MEDICINE
Payer: COMMERCIAL

## 2021-12-14 VITALS
SYSTOLIC BLOOD PRESSURE: 143 MMHG | RESPIRATION RATE: 18 BRPM | TEMPERATURE: 96.7 F | DIASTOLIC BLOOD PRESSURE: 70 MMHG | HEART RATE: 60 BPM | OXYGEN SATURATION: 100 % | WEIGHT: 297 LBS | HEIGHT: 75 IN | BODY MASS INDEX: 36.93 KG/M2

## 2021-12-14 DIAGNOSIS — C90.00 MULTIPLE MYELOMA NOT HAVING ACHIEVED REMISSION (HCC): Primary | ICD-10-CM

## 2021-12-14 PROCEDURE — 96401 CHEMO ANTI-NEOPL SQ/IM: CPT

## 2021-12-14 RX ORDER — LOPERAMIDE HYDROCHLORIDE 2 MG/1
2 CAPSULE ORAL 2 TIMES DAILY PRN
COMMUNITY

## 2021-12-14 RX ORDER — DEXAMETHASONE 4 MG/1
20 TABLET ORAL ONCE
Status: COMPLETED | OUTPATIENT
Start: 2021-12-14 | End: 2021-12-14

## 2021-12-14 RX ADMIN — BORTEZOMIB 3.4 MG: 3.5 INJECTION, POWDER, LYOPHILIZED, FOR SOLUTION INTRAVENOUS; SUBCUTANEOUS at 11:55

## 2021-12-14 RX ADMIN — DEXAMETHASONE 20 MG: 4 TABLET ORAL at 11:25

## 2021-12-16 ENCOUNTER — ANTICOAG VISIT (OUTPATIENT)
Dept: CARDIOLOGY CLINIC | Facility: CLINIC | Age: 71
End: 2021-12-16

## 2021-12-16 DIAGNOSIS — I48.20 CHRONIC ATRIAL FIBRILLATION (HCC): Primary | ICD-10-CM

## 2021-12-16 LAB — INR PPP: 2 (ref 0.84–1.19)

## 2021-12-21 ENCOUNTER — HOSPITAL ENCOUNTER (OUTPATIENT)
Dept: INFUSION CENTER | Facility: HOSPITAL | Age: 71
Discharge: HOME | End: 2021-12-21
Attending: INTERNAL MEDICINE
Payer: COMMERCIAL

## 2021-12-21 VITALS
HEART RATE: 67 BPM | DIASTOLIC BLOOD PRESSURE: 56 MMHG | SYSTOLIC BLOOD PRESSURE: 126 MMHG | WEIGHT: 299 LBS | TEMPERATURE: 97 F | OXYGEN SATURATION: 98 % | BODY MASS INDEX: 37.18 KG/M2 | RESPIRATION RATE: 18 BRPM | HEIGHT: 75 IN

## 2021-12-21 DIAGNOSIS — C90.00 MULTIPLE MYELOMA NOT HAVING ACHIEVED REMISSION (HCC): Primary | ICD-10-CM

## 2021-12-21 PROCEDURE — 96401 CHEMO ANTI-NEOPL SQ/IM: CPT

## 2021-12-21 RX ORDER — DEXAMETHASONE 4 MG/1
20 TABLET ORAL ONCE
Status: COMPLETED | OUTPATIENT
Start: 2021-12-21 | End: 2021-12-21

## 2021-12-21 RX ADMIN — DEXAMETHASONE 20 MG: 4 TABLET ORAL at 11:23

## 2021-12-21 RX ADMIN — BORTEZOMIB 3.4 MG: 3.5 INJECTION, POWDER, LYOPHILIZED, FOR SOLUTION INTRAVENOUS; SUBCUTANEOUS at 11:52

## 2021-12-28 ENCOUNTER — HOSPITAL ENCOUNTER (OUTPATIENT)
Dept: INFUSION CENTER | Facility: HOSPITAL | Age: 71
Discharge: HOME | End: 2021-12-28
Attending: INTERNAL MEDICINE
Payer: COMMERCIAL

## 2021-12-28 VITALS
OXYGEN SATURATION: 97 % | RESPIRATION RATE: 16 BRPM | TEMPERATURE: 98.1 F | HEART RATE: 76 BPM | SYSTOLIC BLOOD PRESSURE: 143 MMHG | BODY MASS INDEX: 37.18 KG/M2 | DIASTOLIC BLOOD PRESSURE: 73 MMHG | WEIGHT: 298.99 LBS | HEIGHT: 75 IN

## 2021-12-28 DIAGNOSIS — C90.00 MULTIPLE MYELOMA NOT HAVING ACHIEVED REMISSION (HCC): Primary | ICD-10-CM

## 2021-12-28 PROCEDURE — 96401 CHEMO ANTI-NEOPL SQ/IM: CPT

## 2021-12-28 RX ORDER — DEXAMETHASONE 4 MG/1
20 TABLET ORAL ONCE
Status: COMPLETED | OUTPATIENT
Start: 2021-12-28 | End: 2021-12-28

## 2021-12-28 RX ADMIN — BORTEZOMIB 3.4 MG: 3.5 INJECTION, POWDER, LYOPHILIZED, FOR SOLUTION INTRAVENOUS; SUBCUTANEOUS at 11:58

## 2021-12-28 RX ADMIN — DEXAMETHASONE 20 MG: 4 TABLET ORAL at 11:32

## 2021-12-28 NOTE — PROGRESS NOTES
Rec'd pt in good spirits via wc, no new complaints made  Velcade inj to right lower abdom at level of umbilicus tolerated well  Pt then discharged via ambulance transport to [facility]

## 2021-12-30 ENCOUNTER — ANTICOAG VISIT (OUTPATIENT)
Dept: CARDIOLOGY CLINIC | Facility: CLINIC | Age: 71
End: 2021-12-30

## 2021-12-30 DIAGNOSIS — I48.20 CHRONIC ATRIAL FIBRILLATION (HCC): Primary | ICD-10-CM

## 2021-12-30 LAB — INR PPP: 2.1 (ref 0.84–1.19)

## 2022-01-01 ENCOUNTER — APPOINTMENT (EMERGENCY)
Dept: RADIOLOGY | Facility: HOSPITAL | Age: 72
End: 2022-01-01
Payer: COMMERCIAL

## 2022-01-01 ENCOUNTER — HOSPITAL ENCOUNTER (EMERGENCY)
Facility: HOSPITAL | Age: 72
End: 2022-10-17
Attending: EMERGENCY MEDICINE
Payer: COMMERCIAL

## 2022-01-01 ENCOUNTER — HOSPITAL ENCOUNTER (OUTPATIENT)
Dept: INFUSION CENTER | Facility: HOSPITAL | Age: 72
End: 2022-01-01
Attending: INTERNAL MEDICINE

## 2022-01-01 VITALS
BODY MASS INDEX: 31.25 KG/M2 | OXYGEN SATURATION: 95 % | TEMPERATURE: 98.9 F | DIASTOLIC BLOOD PRESSURE: 79 MMHG | WEIGHT: 250 LBS | HEART RATE: 136 BPM | RESPIRATION RATE: 34 BRPM | SYSTOLIC BLOOD PRESSURE: 123 MMHG

## 2022-01-01 DIAGNOSIS — I50.9 HEART FAILURE (HCC): Primary | ICD-10-CM

## 2022-01-01 DIAGNOSIS — Z12.11 COLON CANCER SCREENING: ICD-10-CM

## 2022-01-01 LAB
ALBUMIN SERPL BCP-MCNC: 2.1 G/DL (ref 3.5–5)
ALP SERPL-CCNC: 215 U/L (ref 46–116)
ALT SERPL W P-5'-P-CCNC: 35 U/L (ref 12–78)
ANION GAP SERPL CALCULATED.3IONS-SCNC: 18 MMOL/L (ref 4–13)
ANISOCYTOSIS BLD QL SMEAR: PRESENT
APTT PPP: 74 SECONDS (ref 23–37)
AST SERPL W P-5'-P-CCNC: 101 U/L (ref 5–45)
ATRIAL RATE: 138 BPM
BASE EXCESS BLDA CALC-SCNC: -10 MMOL/L (ref -2–3)
BASE EXCESS BLDA CALC-SCNC: 1 MMOL/L (ref -2–3)
BASOPHILS # BLD MANUAL: 0 THOUSAND/UL (ref 0–0.1)
BASOPHILS NFR MAR MANUAL: 0 % (ref 0–1)
BILIRUB SERPL-MCNC: 1.9 MG/DL (ref 0.2–1)
BUN SERPL-MCNC: 25 MG/DL (ref 5–25)
CA-I BLD-SCNC: 1.03 MMOL/L (ref 1.12–1.32)
CA-I BLD-SCNC: 1.15 MMOL/L (ref 1.12–1.32)
CALCIUM ALBUM COR SERPL-MCNC: 10.5 MG/DL (ref 8.3–10.1)
CALCIUM SERPL-MCNC: 9 MG/DL (ref 8.3–10.1)
CARDIAC TROPONIN I PNL SERPL HS: 452 NG/L
CHLORIDE SERPL-SCNC: 90 MMOL/L (ref 96–108)
CO2 SERPL-SCNC: 26 MMOL/L (ref 21–32)
CREAT SERPL-MCNC: 3.79 MG/DL (ref 0.6–1.3)
EOSINOPHIL # BLD MANUAL: 0.24 THOUSAND/UL (ref 0–0.4)
EOSINOPHIL NFR BLD MANUAL: 2 % (ref 0–6)
ERYTHROCYTE [DISTWIDTH] IN BLOOD BY AUTOMATED COUNT: 18.7 % (ref 11.6–15.1)
FLUAV RNA RESP QL NAA+PROBE: NEGATIVE
FLUBV RNA RESP QL NAA+PROBE: NEGATIVE
GFR SERPL CREATININE-BSD FRML MDRD: 15 ML/MIN/1.73SQ M
GLUCOSE SERPL-MCNC: 106 MG/DL (ref 65–140)
GLUCOSE SERPL-MCNC: 166 MG/DL (ref 65–140)
GLUCOSE SERPL-MCNC: 181 MG/DL (ref 65–140)
GLUCOSE SERPL-MCNC: 99 MG/DL (ref 65–140)
HCO3 BLDA-SCNC: 17.6 MMOL/L (ref 24–30)
HCO3 BLDA-SCNC: 25.7 MMOL/L (ref 24–30)
HCT VFR BLD AUTO: 34.6 % (ref 36.5–49.3)
HCT VFR BLD CALC: 24 % (ref 36.5–49.3)
HCT VFR BLD CALC: 34 % (ref 36.5–49.3)
HGB BLD-MCNC: 10.3 G/DL (ref 12–17)
HGB BLDA-MCNC: 11.6 G/DL (ref 12–17)
HGB BLDA-MCNC: 8.2 G/DL (ref 12–17)
HYPERCHROMIA BLD QL SMEAR: PRESENT
INR PPP: 6.36 (ref 0.84–1.19)
LACTATE SERPL-SCNC: 6.8 MMOL/L (ref 0.5–2)
LYMPHOCYTES # BLD AUTO: 1.31 THOUSAND/UL (ref 0.6–4.47)
LYMPHOCYTES # BLD AUTO: 11 % (ref 14–44)
MCH RBC QN AUTO: 25.6 PG (ref 26.8–34.3)
MCHC RBC AUTO-ENTMCNC: 29.8 G/DL (ref 31.4–37.4)
MCV RBC AUTO: 86 FL (ref 82–98)
METAMYELOCYTES NFR BLD MANUAL: 2 % (ref 0–1)
MONOCYTES # BLD AUTO: 0.48 THOUSAND/UL (ref 0–1.22)
MONOCYTES NFR BLD: 4 % (ref 4–12)
NEUTROPHILS # BLD MANUAL: 9.62 THOUSAND/UL (ref 1.85–7.62)
NEUTS BAND NFR BLD MANUAL: 5 % (ref 0–8)
NEUTS SEG NFR BLD AUTO: 76 % (ref 43–75)
NRBC BLD AUTO-RTO: 1 /100 WBC (ref 0–2)
NT-PROBNP SERPL-MCNC: ABNORMAL PG/ML
PCO2 BLD: 19 MMOL/L (ref 21–32)
PCO2 BLD: 27 MMOL/L (ref 21–32)
PCO2 BLD: 41.8 MM HG (ref 42–50)
PCO2 BLD: 49.5 MM HG (ref 42–50)
PH BLD: 7.16 [PH] (ref 7.3–7.4)
PH BLD: 7.4 [PH] (ref 7.3–7.4)
PLATELET # BLD AUTO: 268 THOUSANDS/UL (ref 149–390)
PLATELET BLD QL SMEAR: ADEQUATE
PMV BLD AUTO: 10.5 FL (ref 8.9–12.7)
PO2 BLD: 14 MM HG (ref 35–45)
PO2 BLD: 20 MM HG (ref 35–45)
POTASSIUM BLD-SCNC: 3.5 MMOL/L (ref 3.5–5.3)
POTASSIUM BLD-SCNC: 4.3 MMOL/L (ref 3.5–5.3)
POTASSIUM SERPL-SCNC: 4.3 MMOL/L (ref 3.5–5.3)
PROCALCITONIN SERPL-MCNC: 529.18 NG/ML
PROT SERPL-MCNC: 6.9 G/DL (ref 6.4–8.4)
PROTHROMBIN TIME: 58.3 SECONDS (ref 11.6–14.5)
QRS AXIS: 59 DEGREES
QRSD INTERVAL: 138 MS
QT INTERVAL: 348 MS
QTC INTERVAL: 499 MS
RBC # BLD AUTO: 4.03 MILLION/UL (ref 3.88–5.62)
RBC MORPH BLD: PRESENT
RSV RNA RESP QL NAA+PROBE: NEGATIVE
SAO2 % BLD FROM PO2: 16 % (ref 60–85)
SAO2 % BLD FROM PO2: 22 % (ref 60–85)
SARS-COV-2 RNA RESP QL NAA+PROBE: NEGATIVE
SODIUM BLD-SCNC: 134 MMOL/L (ref 136–145)
SODIUM BLD-SCNC: 139 MMOL/L (ref 136–145)
SODIUM SERPL-SCNC: 134 MMOL/L (ref 135–147)
SPECIMEN SOURCE: ABNORMAL
SPECIMEN SOURCE: ABNORMAL
T WAVE AXIS: -29 DEGREES
VENTRICULAR RATE: 124 BPM
WBC # BLD AUTO: 11.88 THOUSAND/UL (ref 4.31–10.16)

## 2022-01-01 PROCEDURE — 31500 INSERT EMERGENCY AIRWAY: CPT

## 2022-01-01 PROCEDURE — 99291 CRITICAL CARE FIRST HOUR: CPT | Performed by: EMERGENCY MEDICINE

## 2022-01-01 PROCEDURE — 99285 EMERGENCY DEPT VISIT HI MDM: CPT

## 2022-01-01 PROCEDURE — 84132 ASSAY OF SERUM POTASSIUM: CPT

## 2022-01-01 PROCEDURE — 85027 COMPLETE CBC AUTOMATED: CPT | Performed by: EMERGENCY MEDICINE

## 2022-01-01 PROCEDURE — 85610 PROTHROMBIN TIME: CPT | Performed by: EMERGENCY MEDICINE

## 2022-01-01 PROCEDURE — 84145 PROCALCITONIN (PCT): CPT | Performed by: EMERGENCY MEDICINE

## 2022-01-01 PROCEDURE — 82330 ASSAY OF CALCIUM: CPT

## 2022-01-01 PROCEDURE — 71045 X-RAY EXAM CHEST 1 VIEW: CPT

## 2022-01-01 PROCEDURE — 82803 BLOOD GASES ANY COMBINATION: CPT

## 2022-01-01 PROCEDURE — 85014 HEMATOCRIT: CPT

## 2022-01-01 PROCEDURE — 82947 ASSAY GLUCOSE BLOOD QUANT: CPT

## 2022-01-01 PROCEDURE — 93010 ELECTROCARDIOGRAM REPORT: CPT | Performed by: INTERNAL MEDICINE

## 2022-01-01 PROCEDURE — 85730 THROMBOPLASTIN TIME PARTIAL: CPT | Performed by: EMERGENCY MEDICINE

## 2022-01-01 PROCEDURE — 84295 ASSAY OF SERUM SODIUM: CPT

## 2022-01-01 PROCEDURE — 36415 COLL VENOUS BLD VENIPUNCTURE: CPT | Performed by: EMERGENCY MEDICINE

## 2022-01-01 PROCEDURE — 87040 BLOOD CULTURE FOR BACTERIA: CPT | Performed by: EMERGENCY MEDICINE

## 2022-01-01 PROCEDURE — 85007 BL SMEAR W/DIFF WBC COUNT: CPT | Performed by: EMERGENCY MEDICINE

## 2022-01-01 PROCEDURE — 82948 REAGENT STRIP/BLOOD GLUCOSE: CPT

## 2022-01-01 PROCEDURE — 0241U HB NFCT DS VIR RESP RNA 4 TRGT: CPT | Performed by: EMERGENCY MEDICINE

## 2022-01-01 PROCEDURE — 92950 HEART/LUNG RESUSCITATION CPR: CPT

## 2022-01-01 PROCEDURE — 83880 ASSAY OF NATRIURETIC PEPTIDE: CPT | Performed by: EMERGENCY MEDICINE

## 2022-01-01 PROCEDURE — 84484 ASSAY OF TROPONIN QUANT: CPT | Performed by: EMERGENCY MEDICINE

## 2022-01-01 PROCEDURE — 93005 ELECTROCARDIOGRAM TRACING: CPT

## 2022-01-01 PROCEDURE — 83605 ASSAY OF LACTIC ACID: CPT | Performed by: EMERGENCY MEDICINE

## 2022-01-01 PROCEDURE — 80053 COMPREHEN METABOLIC PANEL: CPT | Performed by: EMERGENCY MEDICINE

## 2022-01-01 RX ORDER — EPINEPHRINE 0.1 MG/ML
SYRINGE (ML) INJECTION CODE/TRAUMA/SEDATION MEDICATION
Status: COMPLETED | OUTPATIENT
Start: 2022-01-01 | End: 2022-01-01

## 2022-01-01 RX ORDER — DILTIAZEM HYDROCHLORIDE 5 MG/ML
15 INJECTION INTRAVENOUS ONCE
Status: DISCONTINUED | OUTPATIENT
Start: 2022-01-01 | End: 2022-10-18 | Stop reason: HOSPADM

## 2022-01-01 RX ORDER — FENTANYL CITRATE-0.9 % NACL/PF 10 MCG/ML
50 PLASTIC BAG, INJECTION (ML) INTRAVENOUS CONTINUOUS
Status: DISCONTINUED | OUTPATIENT
Start: 2022-01-01 | End: 2022-10-18 | Stop reason: HOSPADM

## 2022-01-01 RX ORDER — CALCIUM CHLORIDE 100 MG/ML
SYRINGE (ML) INTRAVENOUS CODE/TRAUMA/SEDATION MEDICATION
Status: COMPLETED | OUTPATIENT
Start: 2022-01-01 | End: 2022-01-01

## 2022-01-01 RX ORDER — SODIUM BICARBONATE 84 MG/ML
INJECTION, SOLUTION INTRAVENOUS CODE/TRAUMA/SEDATION MEDICATION
Status: COMPLETED | OUTPATIENT
Start: 2022-01-01 | End: 2022-01-01

## 2022-01-01 RX ADMIN — CALCIUM CHLORIDE 1 G: 100 INJECTION INTRAVENOUS; INTRAVENTRICULAR at 19:49

## 2022-01-01 RX ADMIN — EPINEPHRINE 1 MG: 0.1 INJECTION INTRACARDIAC; INTRAVENOUS at 19:43

## 2022-01-01 RX ADMIN — EPINEPHRINE 1 MG: 0.1 INJECTION INTRACARDIAC; INTRAVENOUS at 19:46

## 2022-01-01 RX ADMIN — EPINEPHRINE 1 MG: 0.1 INJECTION INTRACARDIAC; INTRAVENOUS at 19:30

## 2022-01-01 RX ADMIN — EPINEPHRINE 1 MG: 0.1 INJECTION INTRACARDIAC; INTRAVENOUS at 19:49

## 2022-01-01 RX ADMIN — EPINEPHRINE 1 MG: 0.1 INJECTION INTRACARDIAC; INTRAVENOUS at 19:27

## 2022-01-01 RX ADMIN — SODIUM BICARBONATE 50 MEQ: 84 INJECTION, SOLUTION INTRAVENOUS at 19:30

## 2022-01-01 RX ADMIN — CALCIUM CHLORIDE 1 G: 100 INJECTION INTRAVENOUS; INTRAVENTRICULAR at 19:28

## 2022-01-01 RX ADMIN — SODIUM BICARBONATE 100 MEQ: 84 INJECTION, SOLUTION INTRAVENOUS at 19:51

## 2022-01-04 RX ORDER — DEXAMETHASONE 4 MG/1
20 TABLET ORAL ONCE
Status: CANCELLED
Start: 2022-01-18 | End: 2022-01-18

## 2022-01-04 RX ORDER — DEXAMETHASONE 4 MG/1
20 TABLET ORAL ONCE
Status: CANCELLED
Start: 2022-01-25 | End: 2022-01-25

## 2022-01-04 RX ORDER — DEXAMETHASONE 4 MG/1
20 TABLET ORAL ONCE
Status: CANCELLED
Start: 2022-01-11 | End: 2022-01-11

## 2022-01-04 RX ORDER — DEXAMETHASONE 4 MG/1
20 TABLET ORAL ONCE
Status: CANCELLED
Start: 2022-02-01 | End: 2022-02-01

## 2022-01-07 ENCOUNTER — TELEPHONE (OUTPATIENT)
Dept: HEMATOLOGY ONCOLOGY | Facility: HOSPITAL | Age: 72
End: 2022-01-07

## 2022-01-07 NOTE — TELEPHONE ENCOUNTER
01/07/22    Spoke with pt's nursing facility regarding updated labs  The nurse stated pt has already completed the MM panel on 1/5 and hopefully we can receive the result by next Mon  They will fax the result to us and I also give the [facility] office fax number  Sri Nassar is aware and once she receive it, Sri Nassar will hand it to Michaela directly

## 2022-01-07 NOTE — PROGRESS NOTES
Hematology/Oncology Outpatient Follow- up Note  Nicanor Perez 1950, [id number]  1/10/2022        Chief Complaint   Patient presents with    Follow-up       HPI:  Gal Proctor a 72 yo male with multiple comorbidities including diabetes and hypertension with bone marrow biopsy proven multiple myeloma  He was referred to us in 8/2019 after 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  He was noted to have elevated kidney function and anemia which prompted work up for multiple myeloma  His skeletal survey was negative  He underwent bone marrow biopsy confirming multiple myeloma  He was initiated on treatment with with Velcade and Decadron weekly on a 35 day schedule   Velcade is 1 3 milligrams/meter squared and Decadron is 20 mg p o  On days 1, 8, 15, 22      Previous Hematologic/ Oncologic History:    Oncology History   Multiple myeloma ([facility] )   9/16/2019 Initial Diagnosis    Multiple myeloma not having achieved remission ([facility] )     9/24/2019 -  Chemotherapy    iron sucrose (VENOFER), 200 mg (100 % of original dose 200 mg), Intravenous, Once, 1 of 1 cycle  Dose modification: 200 mg (original dose 200 mg, Cycle 1, Reason: Other (Must fill in a comment))  Administration: 200 mg (9/24/2019)  bortezomib (VELCADE), 1 3 mg/m2 = 3 5 mg (81 3 % of original dose 1 6 mg/m2), Subcutaneous, Once, 20 of 27 cycles  Dose modification: 1 3 mg/m2 (original dose 1 6 mg/m2, Cycle 1, Reason: Dose Not Tolerated)  Administration: 3 5 mg (9/24/2019), 3 5 mg (10/1/2019), 3 5 mg (10/8/2019), 3 5 mg (10/15/2019), 3 5 mg (1/6/2020), 3 5 mg (1/13/2020), 3 5 mg (1/20/2020), 3 5 mg (1/27/2020), 3 5 mg (2/11/2020), 3 5 mg (2/17/2020), 3 5 mg (2/24/2020), 3 5 mg (3/2/2020), 3 5 mg (3/16/2020), 3 5 mg (3/23/2020), 3 5 mg (3/30/2020), 3 5 mg (4/6/2020), 3 5 mg (4/20/2020), 3 5 mg (4/27/2020), 3 5 mg (5/4/2020), 3 5 mg (5/11/2020), 3 5 mg (5/26/2020), 3 5 mg (6/1/2020), 3 5 mg (6/8/2020), 3 5 mg (6/15/2020), 3 5 mg (2020), 3 4 mg (2020), 3 4 mg (2020), 3 5 mg (2020), 3 4 mg (2020), 3 4 mg (2020), 3 4 mg (2020), 3 4 mg (10/5/2020), 3 4 mg (2020), 3 4 mg (2020), 3 4 mg (2020), 3 4 mg (2020), 3 4 mg (2020), 3 4 mg (2020), 3 4 mg (2020), 3 4 mg (2021), 3 4 mg (2021), 3 4 mg (2021), 3 4 mg (2/3/2021), 3 4 mg (2021), 3 4 mg (2/15/2021), 3 4 mg (2021), 3 4 mg (3/9/2021), 3 4 mg (3/16/2021), 3 4 mg (3/23/2021), 3 4 mg (3/30/2021), 3 4 mg (2021), 3 4 mg (2021), 3 4 mg (2021), 3 4 mg (2021), 3 5 mg (2021), 3 5 mg (2021), 3 5 mg (2021), 3 5 mg (2021), 3 5 mg (2021), 3 5 mg (2021), 3 5 mg (2021), 3 5 mg (2021), 3 5 mg (2021), 3 5 mg (8/3/2021), 3 5 mg (8/10/2021), 3 5 mg (2021), 3 5 mg (2021), 3 5 mg (2021), 3 5 mg (2021), 3 5 mg (2021), 3 4 mg (2021), 3 4 mg (2021), 3 4 mg (2021), 3 4 mg (2021), 3 4 mg (2021), 3 4 mg (2021), 3 4 mg (2021), 3 4 mg (2021)         Current Hematologic/ Oncologic Treatment:    Velcade and Decadron    ECO - Symptomatic, <50% confined to bed    Interval History:   The patient presents for routine follow up  He was last seen on   He denies any change to his overall health since last visit  He reports he is at his baseline today  Most recent blood work completed on  was reviewed  Calcium 8 8  BUN 27, creatinine 1 3  CBC acceptable range  Hemoglobin 10 3  White count 8 7, platelets 282  Myeloma labs are pending  He is due to start cycle 21 on   He continues to tolerate treatments  Appetite remains good  No N/V  Occasional constipation  Denies any bony pain  Cancer Staging:  Cancer Staging  No matching staging information was found for the patient  Molecular Testing:         Test Results:    Imaging: No results found      Labs:   Lab Results   Component Value Date    WBC 6 25 10/28/2021    HGB 11 2 (L) 10/28/2021    HCT 37 4 10/28/2021    MCV 87 10/28/2021     10/28/2021     Lab Results   Component Value Date     01/15/2018    K 3 8 10/28/2021     10/28/2021    CO2 28 10/28/2021    ANIONGAP 11 12/22/2015    BUN 33 (H) 10/28/2021    CREATININE 1 42 (H) 10/28/2021    GLUCOSE 195 (H) 08/30/2020    GLUF 150 (H) 09/29/2021    CALCIUM 9 3 10/28/2021    CORRECTEDCA 10 1 10/28/2021    AST 14 10/28/2021    ALT 13 10/28/2021    ALKPHOS 128 (H) 10/28/2021    PROT 7 1 01/15/2018    BILITOT 0 6 01/15/2018    EGFR 50 10/28/2021         Review of Systems   Gastrointestinal: Positive for constipation  Musculoskeletal: Positive for arthralgias and gait problem  All other systems reviewed and are negative           Active Problems:   Patient Active Problem List   Diagnosis    Diabetic foot ulcer ([facility] )    Diabetes mellitus type 2, uncontrolled ([facility] )    Chronic venous hypertension, right    Essential hypertension    Chronic atrial fibrillation (HCC)    Morbid obesity ([address] )    Acute on chronic combined systolic and diastolic congestive heart failure (HCC)    Cardiomyopathy (HCC)    Diabetes, polyneuropathy ([address] )    GERD (gastroesophageal reflux disease)    Hyperlipidemia    Onychomycosis    Gallstones    Localized edema    Peripheral vascular disease (HCC)    SOB (shortness of breath)    NALLELY (obstructive sleep apnea)    Moderate persistent asthma without complication    DORA (acute kidney injury) ([address] )    Multiple myeloma ([address] )    Above knee amputation of left lower extremity ([address] )    Hiatal hernia    Weakness    Type 2 acute myocardial infarction ([address] )    Chronic obstructive pulmonary disease, unspecified ([address] )    Hypertensive chronic kidney disease w stg 1-4/unsp chr kdny    Mass of psoas muscle    CKD (chronic kidney disease)    Chronic diastolic (congestive) heart failure (HCC)    Lymphedema    Rash    Difficulty in walking, not elsewhere classified    Gout    Venous insufficiency (chronic) (peripheral)    Sepsis due to COVID-19 pneumonia ([place] [address] )    Fall from bed    Need for influenza vaccination    Venous hypertension, chronic, with ulcer and inflammation, right (HCC)    Traumatic skin ulcer ([place] [address] )    Ambulatory dysfunction    Anemia    Supratherapeutic INR       Past Medical History:   Past Medical History:   Diagnosis Date    Cancer ([place] [address] )     CHF (congestive heart failure) (HCC)     Chronic kidney disease     COPD (chronic obstructive pulmonary disease) ([place] [address] )     COVID-19     Decubitus ulcer of heel     LAST ASSESSED [id number]    Diabetes mellitus ([place] [address] )     History of varicose veins     Hypertension     Intermittent claudication (HCC)     Neuropathy     Seasonal allergies        Surgical History:   Past Surgical History:   Procedure Laterality Date    AMPUTATION ABOVE KNEE (AKA) Left 7/31/2019    Procedure: AMPUTATION ABOVE KNEE (AKA); Surgeon: Hany Vasquez MD;  Location: [facility];  Service: General    ANGIOPLASTY      W/ FLUOROSC ANGIOGRAPGY PERIPHERAL ARTERY ADDITIONAL  LAST ASSESSED [id number]    ANGIOPLASTY / STENTING FEMORAL      TANSCATH INTRAVASCULAR STENT PLACEMENT PERCUTANEOUS FEMORAL     COLONOSCOPY  2010    CT BONE MARROW BIOPSY AND ASPIRATION  8/9/2019    FULL THICKNESS SKIN GRAFT      TENDON REPAIR      TOE AMPUTATION Left 12/27/2018    Procedure: AMPUTATION left 4th TOE;  Surgeon: Domitila Rivera DPM;  Location: [facility];  Service: Podiatry       Family History:    Family History   Problem Relation Age of Onset    Other Mother         CARDIAC DISORDER     Diabetes Mother     Cancer Father     Other Family         CARDIAC DISORDER     Diabetes Family     Cancer Family     Mental illness Family     Kidney disease Sister     Diabetes Sister        Cancer-related family history includes Cancer in his family and father      Social History:   Social History     Socioeconomic History    Marital status:      Spouse name: Not on file    Number of children: 1    Years of education: Not on file    Highest education level: Not on file   Occupational History    Occupation: RETIRED   Tobacco Use    Smoking status: Never Smoker    Smokeless tobacco: Never Used   Vaping Use    Vaping Use: Never used   Substance and Sexual Activity    Alcohol use: Not Currently    Drug use: Not Currently    Sexual activity: Not Currently   Other Topics Concern    Not on file   Social History Narrative    DENIED [address]    FEELS SAFE AT HOME    LIVES WITH FAMILY - SISTER    NO LIVING WILL    POA IN EXISTENCE     SUPPORTIVE AND SAFE    One son, 2 granddaughters     Social Determinants of Health     Financial Resource Strain: Not on file   Food Insecurity: Not on file   Transportation Needs: Not on file   Physical Activity: Not on file   Stress: Not on file   Social Connections: Not on file   Intimate Partner Violence: Not on file   Housing Stability: Not on file       Current Medications:   Current Outpatient Medications   Medication Sig Dispense Refill    acetaminophen (TYLENOL) 500 mg tablet Take 1 tablet (500 mg total) by mouth every 6 (six) hours as needed for mild pain, headaches or fever 90 tablet 1    albuterol (PROVENTIL HFA,VENTOLIN HFA) 90 mcg/act inhaler Inhale 2 puffs every 6 (six) hours as needed for wheezing 1 Inhaler 0    Alcohol Swabs (ALCOHOL PREP) 70 % PADS   0    allopurinol (ZYLOPRIM) 300 mg tablet TAKE ONE TABLET BY MOUTH DAILY (Patient taking differently: 300 mg  ) 30 tablet 5    ammonium lactate (LAC-HYDRIN) 12 % lotion APPLY TO BLE TOPICALLY DAILY 400 g 2    atorvastatin (LIPITOR) 40 mg tablet Take 1 tablet (40 mg total) by mouth daily after dinner 30 tablet 0    BD AUTOSHIELD DUO 30G X 5 MM MISC USE AS DIRECTED WITH INSULIN FOUR TIMES A  each 3    BD INSULIN SYRINGE U/F 31G X 5/16" 0 5 ML MISC USE AS DIRECTED WITH NOVOLIN 70/30  0    BD PEN NEEDLE HILARIO U/F 32G X 4 MM MISC by Other route 3 (three) times a day 300 each 1    bisacodyl (bisacodyl) 5 mg EC tablet Take 5 mg by mouth daily as needed for constipation      bisacodyl (DULCOLAX) 10 mg suppository Insert 10 mg into the rectum as needed for constipation      bortezomib (VELCADE) Infuse into a venous catheter once        clotrimazole (LOTRIMIN) 1 % cream APPLY TO BUTTOCK 2 TIMES DAILY 45 g 3    colchicine (COLCRYS) 0 6 mg tablet Take 1 tablet (0 6 mg total) by mouth daily 2 tablet 0    docusate sodium (COLACE) 100 mg capsule Take 1 capsule (100 mg total) by mouth 2 (two) times a day  0    Easy Touch Safety Lancets 28G MISC USE 4 TIMES DAILY TO TEST BLOOD SUGAR 100 each 5    fluticasone-salmeterol (ADVAIR DISKUS, WIXELA INHUB) 250-50 mcg/dose inhaler Inhale 1 puff 2 (two) times a day Rinse mouth after use   1 Inhaler 5    insulin glargine (Lantus SoloStar) 100 units/mL injection pen Inject 22 Units under the skin daily  0    Insulin Pen Needle 31G X 5 MM MISC by Does not apply route 2 (two) times a day for 50 days 100 each 0    liraglutide (Victoza) injection Inject 1 8 mg under the skin daily      loperamide (IMODIUM) 2 mg capsule Take 2 mg by mouth 4 (four) times a day as needed for diarrhea      magnesium hydroxide (MILK OF MAGNESIA) 400 mg/5 mL oral suspension Take 30 mL by mouth daily as needed for constipation      Melatonin 5 MG TABS TAKE ONE TABLET BY MOUTH EVERY NIGHT AT BEDTIME 30 tablet 2    metFORMIN (GLUCOPHAGE) 1000 MG tablet TAKE ONE TABLET BY MOUTH TWO TIMES A DAY (Patient taking differently: Take 1,000 mg by mouth daily at bedtime ) 60 tablet 2    metoprolol succinate (TOPROL-XL) 25 mg 24 hr tablet Take 1 tablet (25 mg total) by mouth daily  0    multivitamin-minerals therapeutic (THERA-M)       NOVOLOG FLEXPEN 100 units/mL SOPN INJ 5U BEFORE MEALS AND PER SS <250=NO CALL 250-300=5U,301-350= 10U,351-400=15U,401-450=20U>451 CALL FOR ORDERS UP TO 4X PER DAY 15 mL 5    ondansetron (ZOFRAN) 4 mg tablet ondansetron HCl 4 mg tablet   TAKE 1 TABLET BY MOUTH EVERY 8 HOURS AS NEEDED      polyethylene glycol (MIRALAX) 17 g packet Take 17 g by mouth daily  0    potassium chloride (K-DUR,KLOR-CON) 20 mEq tablet Take 2 tablets (40 mEq total) by mouth daily  0    torsemide (DEMADEX) 20 mg tablet Take 2 tablets (40 mg total) by mouth 2 (two) times a day  0    warfarin (COUMADIN) 1 mg tablet Take 1 mg by mouth daily at bedtime 2 mg on Wed and Sun      warfarin (COUMADIN) 6 mg tablet        No current facility-administered medications for this visit  Allergies: Allergies   Allergen Reactions    Latex Rash       Physical Exam:  /68 (BP Location: Left arm, Patient Position: Sitting, Cuff Size: Standard)   Temp (!) 97 4 °F (36 3 °C)   There is no height or weight on file to calculate BSA  Wt Readings from Last 3 Encounters:   12/28/21 136 kg (298 lb 15 8 oz)   12/21/21 136 kg (299 lb)   12/14/21 135 kg (297 lb)           Physical Exam  Constitutional:       General: He is not in acute distress  Appearance: He is obese  He is not toxic-appearing  Comments: Disheveled appearance  NAD  In good spirits    HENT:      Head: Normocephalic and atraumatic  Eyes:      General: No scleral icterus  Right eye: No discharge  Left eye: No discharge  Conjunctiva/sclera: Conjunctivae normal    Cardiovascular:      Rate and Rhythm: Normal rate and regular rhythm  Pulmonary:      Effort: Pulmonary effort is normal       Breath sounds: Normal breath sounds  Abdominal:      General: Bowel sounds are normal       Palpations: Abdomen is soft  Musculoskeletal:         General: Deformity (L AKA) present  Cervical back: Normal range of motion  Skin:     General: Skin is warm and dry  Neurological:      General: No focal deficit present        Mental Status: He is alert and oriented to person, place, and time  Psychiatric:         Mood and Affect: Mood normal          Behavior: Behavior normal          Assessment / Plan:    1  Multiple myeloma not having achieved remission [facility])    The patient is a very pleasant 74 yo male with multiple comorbidities including diabetes and hypertension with bone marrow biopsy proven multiple myeloma  He was referred to us in 8/2019 after 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  He was noted to have elevated kidney function and anemia which prompted work up for multiple myeloma  His skeletal survey was negative  He underwent bone marrow biopsy confirming multiple myeloma  He was initiated on treatment with with Velcade and Decadron weekly on a 35 day schedule  Velcade is 1 3 milligrams/meter squared and Decadron is 20 mg p o  On days 1, 8, 15, 22  Skeletal survey from 11/2020 showed no evidence of lytic bony lesions  CT CAP done during his last hospitalization on 10/4/21 showed no acute osseous pathology     He continues to tolerate his treatment well  His CBC and CMP are in acceptable range  Myeloma labs are pending at time of visit but have been stable  He will continue with current regimen without change  He will return for follow up in 2 months with repeat blood work  He will have labs done prior to treatments as planned  Patient was in agreement with plan of care  He was instructed to call with any questions or concerns prior to his next office visit         Goals and Barriers:  Current Goal:  Prolong Survival from multiple myeloma   Barriers: None  Patient's Capacity to Self Care:  Patient able to self care  Portions of the record may have been created with voice recognition software  Occasional wrong word or "sound a like" substitutions may have occurred due to the inherent limitations of voice recognition software    Read the chart carefully and recognize, using context, where substitutions have occurred

## 2022-01-10 ENCOUNTER — OFFICE VISIT (OUTPATIENT)
Dept: HEMATOLOGY ONCOLOGY | Facility: HOSPITAL | Age: 72
End: 2022-01-10
Payer: COMMERCIAL

## 2022-01-10 VITALS — SYSTOLIC BLOOD PRESSURE: 120 MMHG | DIASTOLIC BLOOD PRESSURE: 68 MMHG | TEMPERATURE: 97.4 F

## 2022-01-10 DIAGNOSIS — C90.00 MULTIPLE MYELOMA NOT HAVING ACHIEVED REMISSION (HCC): Primary | ICD-10-CM

## 2022-01-10 PROCEDURE — 99214 OFFICE O/P EST MOD 30 MIN: CPT | Performed by: NURSE PRACTITIONER

## 2022-01-10 PROCEDURE — 1160F RVW MEDS BY RX/DR IN RCRD: CPT | Performed by: NURSE PRACTITIONER

## 2022-01-11 ENCOUNTER — HOSPITAL ENCOUNTER (OUTPATIENT)
Dept: INFUSION CENTER | Facility: HOSPITAL | Age: 72
Discharge: HOME | End: 2022-01-11
Attending: INTERNAL MEDICINE
Payer: COMMERCIAL

## 2022-01-11 VITALS
OXYGEN SATURATION: 98 % | HEIGHT: 75 IN | TEMPERATURE: 96.5 F | WEIGHT: 292 LBS | DIASTOLIC BLOOD PRESSURE: 63 MMHG | SYSTOLIC BLOOD PRESSURE: 131 MMHG | RESPIRATION RATE: 16 BRPM | BODY MASS INDEX: 36.31 KG/M2 | HEART RATE: 58 BPM

## 2022-01-11 DIAGNOSIS — C90.00 MULTIPLE MYELOMA NOT HAVING ACHIEVED REMISSION (HCC): Primary | ICD-10-CM

## 2022-01-11 PROCEDURE — 96401 CHEMO ANTI-NEOPL SQ/IM: CPT

## 2022-01-11 RX ORDER — DEXAMETHASONE 4 MG/1
20 TABLET ORAL ONCE
Status: COMPLETED | OUTPATIENT
Start: 2022-01-11 | End: 2022-01-11

## 2022-01-11 RX ADMIN — DEXAMETHASONE 20 MG: 4 TABLET ORAL at 11:38

## 2022-01-11 RX ADMIN — BORTEZOMIB 3.4 MG: 3.5 INJECTION, POWDER, LYOPHILIZED, FOR SOLUTION INTRAVENOUS; SUBCUTANEOUS at 12:24

## 2022-01-11 NOTE — PROGRESS NOTES
Pt tolerated Velcade injection in right abdomen with no adverse reaction  Pt escorted off unit via wc to meet transport team  AVS printed and given to pt

## 2022-01-18 ENCOUNTER — HOSPITAL ENCOUNTER (OUTPATIENT)
Dept: INFUSION CENTER | Facility: HOSPITAL | Age: 72
Discharge: HOME | End: 2022-01-18
Attending: INTERNAL MEDICINE
Payer: COMMERCIAL

## 2022-01-18 VITALS
SYSTOLIC BLOOD PRESSURE: 123 MMHG | DIASTOLIC BLOOD PRESSURE: 64 MMHG | BODY MASS INDEX: 36.31 KG/M2 | TEMPERATURE: 96.2 F | HEIGHT: 75 IN | OXYGEN SATURATION: 98 % | HEART RATE: 55 BPM | WEIGHT: 292 LBS | RESPIRATION RATE: 16 BRPM

## 2022-01-18 DIAGNOSIS — C90.00 MULTIPLE MYELOMA NOT HAVING ACHIEVED REMISSION (HCC): Primary | ICD-10-CM

## 2022-01-18 PROCEDURE — 96401 CHEMO ANTI-NEOPL SQ/IM: CPT

## 2022-01-18 RX ORDER — DEXAMETHASONE 4 MG/1
20 TABLET ORAL ONCE
Status: COMPLETED | OUTPATIENT
Start: 2022-01-18 | End: 2022-01-18

## 2022-01-18 RX ADMIN — DEXAMETHASONE 20 MG: 4 TABLET ORAL at 11:39

## 2022-01-18 RX ADMIN — BORTEZOMIB 3.4 MG: 3.5 INJECTION, POWDER, LYOPHILIZED, FOR SOLUTION INTRAVENOUS; SUBCUTANEOUS at 12:26

## 2022-01-18 NOTE — PROGRESS NOTES
REc'd pt in good spirits via wc brought by [employer]  Injection to lower Right abdomen tolerated well   Pt then discharged back to facility with transport team

## 2022-01-21 ENCOUNTER — ANTICOAG VISIT (OUTPATIENT)
Dept: CARDIOLOGY CLINIC | Facility: CLINIC | Age: 72
End: 2022-01-21

## 2022-01-21 DIAGNOSIS — I48.20 CHRONIC ATRIAL FIBRILLATION (HCC): Primary | ICD-10-CM

## 2022-01-21 LAB — INR PPP: 4.1 (ref 0.84–1.19)

## 2022-01-21 NOTE — PROGRESS NOTES
[address]  will hold x2 then adjusted dosage with recheck on 2/1/22 Also faxed to facility and [facility]

## 2022-01-25 ENCOUNTER — HOSPITAL ENCOUNTER (OUTPATIENT)
Dept: INFUSION CENTER | Facility: HOSPITAL | Age: 72
Discharge: HOME | End: 2022-01-25
Attending: INTERNAL MEDICINE
Payer: COMMERCIAL

## 2022-01-25 VITALS
WEIGHT: 292 LBS | DIASTOLIC BLOOD PRESSURE: 62 MMHG | HEART RATE: 65 BPM | HEIGHT: 75 IN | RESPIRATION RATE: 16 BRPM | TEMPERATURE: 98 F | SYSTOLIC BLOOD PRESSURE: 122 MMHG | BODY MASS INDEX: 36.31 KG/M2 | OXYGEN SATURATION: 97 %

## 2022-01-25 DIAGNOSIS — C90.00 MULTIPLE MYELOMA NOT HAVING ACHIEVED REMISSION (HCC): Primary | ICD-10-CM

## 2022-01-25 PROCEDURE — 96401 CHEMO ANTI-NEOPL SQ/IM: CPT

## 2022-01-25 RX ORDER — DEXAMETHASONE 4 MG/1
20 TABLET ORAL ONCE
Status: COMPLETED | OUTPATIENT
Start: 2022-01-25 | End: 2022-01-25

## 2022-01-25 RX ADMIN — DEXAMETHASONE 20 MG: 4 TABLET ORAL at 11:28

## 2022-01-25 RX ADMIN — BORTEZOMIB 3.4 MG: 3.5 INJECTION, POWDER, LYOPHILIZED, FOR SOLUTION INTRAVENOUS; SUBCUTANEOUS at 12:17

## 2022-01-25 NOTE — PLAN OF CARE
Problem: Potential for Falls  Goal: Patient will remain free of falls  Description: INTERVENTIONS:  - Educate patient/family on patient safety including physical limitations  - Instruct patient to call for assistance with activity   - Consult OT/PT to assist with strengthening/mobility   - Keep Call bell within reach  - Keep bed low and locked with side rails adjusted as appropriate  - Keep care items and personal belongings within reach  - Initiate and maintain comfort rounds  - Make Fall Risk Sign visible to staff  - Offer Toileting every Hours, in advance of need  - Initiate/Maintain  alarm  - Obtain necessary fall risk management equipment:   - Apply yellow socks and bracelet for high fall risk patients  - Consider moving patient to room near nurses station  Outcome: Progressing     Problem: Knowledge Deficit  Goal: Patient/family/caregiver demonstrates understanding of disease process, treatment plan, medications, and discharge instructions  Description: Complete learning assessment and assess knowledge base    Interventions:  - Provide teaching at level of understanding  - Provide teaching via preferred learning methods  Outcome: Progressing

## 2022-01-25 NOTE — PROGRESS NOTES
Pt arrived via w/c for Velcade injection  Pt denies c/o  [facility] did not send paperwork for pt, along with his med list or weight  Received the papers when pt discharged via fax  Instructions given to his   Oral premed given, Velcade given SQ abdomen  AVS sent with pt  Disch via w/c to  to [facility]

## 2022-02-01 ENCOUNTER — HOSPITAL ENCOUNTER (OUTPATIENT)
Dept: INFUSION CENTER | Facility: HOSPITAL | Age: 72
Discharge: HOME | End: 2022-02-01
Attending: INTERNAL MEDICINE
Payer: COMMERCIAL

## 2022-02-01 VITALS
SYSTOLIC BLOOD PRESSURE: 121 MMHG | BODY MASS INDEX: 37.18 KG/M2 | DIASTOLIC BLOOD PRESSURE: 69 MMHG | RESPIRATION RATE: 16 BRPM | HEIGHT: 75 IN | TEMPERATURE: 97.8 F | HEART RATE: 61 BPM | WEIGHT: 299 LBS | OXYGEN SATURATION: 99 %

## 2022-02-01 DIAGNOSIS — C90.00 MULTIPLE MYELOMA NOT HAVING ACHIEVED REMISSION (HCC): Primary | ICD-10-CM

## 2022-02-01 PROCEDURE — 96401 CHEMO ANTI-NEOPL SQ/IM: CPT

## 2022-02-01 RX ORDER — DEXAMETHASONE 4 MG/1
20 TABLET ORAL ONCE
Status: COMPLETED | OUTPATIENT
Start: 2022-02-01 | End: 2022-02-01

## 2022-02-01 RX ADMIN — BORTEZOMIB 3.4 MG: 3.5 INJECTION, POWDER, LYOPHILIZED, FOR SOLUTION INTRAVENOUS; SUBCUTANEOUS at 11:53

## 2022-02-01 RX ADMIN — DEXAMETHASONE 20 MG: 4 TABLET ORAL at 11:23

## 2022-02-02 ENCOUNTER — TELEPHONE (OUTPATIENT)
Dept: HEMATOLOGY ONCOLOGY | Facility: CLINIC | Age: 72
End: 2022-02-02

## 2022-02-02 ENCOUNTER — ANTICOAG VISIT (OUTPATIENT)
Dept: CARDIOLOGY CLINIC | Facility: CLINIC | Age: 72
End: 2022-02-02

## 2022-02-02 DIAGNOSIS — I48.20 CHRONIC ATRIAL FIBRILLATION (HCC): Primary | ICD-10-CM

## 2022-02-02 LAB — INR PPP: 1.1 (ref 0.84–1.19)

## 2022-02-02 NOTE — TELEPHONE ENCOUNTER
Sherie Baltazar from [facility], calling in, to follow up on patients scheduling for infusion treatments     Best call back number [phone] [facility] department

## 2022-02-02 NOTE — PROGRESS NOTES
Spoke with Giuliana Varghese at [facility], advised INR low, will have patient take 8 mg today and 6 mg daily, will recheck 2/8/22    Physician order faxed to [facility] and [address] [place] [address] PT INR order also faxed as requested

## 2022-02-09 ENCOUNTER — ANTICOAG VISIT (OUTPATIENT)
Dept: CARDIOLOGY CLINIC | Facility: CLINIC | Age: 72
End: 2022-02-09

## 2022-02-09 DIAGNOSIS — I48.20 CHRONIC ATRIAL FIBRILLATION (HCC): Primary | ICD-10-CM

## 2022-02-09 LAB — INR PPP: 2.5 (ref 0.84–1.19)

## 2022-02-10 ENCOUNTER — APPOINTMENT (OUTPATIENT)
Dept: LAB | Facility: CLINIC | Age: 72
End: 2022-02-10
Payer: COMMERCIAL

## 2022-02-10 DIAGNOSIS — C90.00 MULTIPLE MYELOMA NOT HAVING ACHIEVED REMISSION (HCC): ICD-10-CM

## 2022-02-10 LAB
ALBUMIN SERPL BCP-MCNC: 3.2 G/DL (ref 3.5–5)
ALP SERPL-CCNC: 116 U/L (ref 46–116)
ALT SERPL W P-5'-P-CCNC: 18 U/L (ref 12–78)
ANION GAP SERPL CALCULATED.3IONS-SCNC: 6 MMOL/L (ref 4–13)
AST SERPL W P-5'-P-CCNC: 11 U/L (ref 5–45)
BASOPHILS # BLD AUTO: 0.04 THOUSANDS/ΜL (ref 0–0.1)
BASOPHILS NFR BLD AUTO: 1 % (ref 0–1)
BILIRUB SERPL-MCNC: 1.47 MG/DL (ref 0.2–1)
BUN SERPL-MCNC: 37 MG/DL (ref 5–25)
CALCIUM ALBUM COR SERPL-MCNC: 9.9 MG/DL (ref 8.3–10.1)
CALCIUM SERPL-MCNC: 9.3 MG/DL (ref 8.3–10.1)
CHLORIDE SERPL-SCNC: 109 MMOL/L (ref 100–108)
CO2 SERPL-SCNC: 26 MMOL/L (ref 21–32)
CREAT SERPL-MCNC: 1.76 MG/DL (ref 0.6–1.3)
EOSINOPHIL # BLD AUTO: 0.33 THOUSAND/ΜL (ref 0–0.61)
EOSINOPHIL NFR BLD AUTO: 5 % (ref 0–6)
ERYTHROCYTE [DISTWIDTH] IN BLOOD BY AUTOMATED COUNT: 18.8 % (ref 11.6–15.1)
GFR SERPL CREATININE-BSD FRML MDRD: 38 ML/MIN/1.73SQ M
GLUCOSE P FAST SERPL-MCNC: 104 MG/DL (ref 65–99)
HCT VFR BLD AUTO: 34.4 % (ref 36.5–49.3)
HGB BLD-MCNC: 10.1 G/DL (ref 12–17)
IMM GRANULOCYTES # BLD AUTO: 0.06 THOUSAND/UL (ref 0–0.2)
IMM GRANULOCYTES NFR BLD AUTO: 1 % (ref 0–2)
INR PPP: 2.34 (ref 0.84–1.19)
LYMPHOCYTES # BLD AUTO: 1.31 THOUSANDS/ΜL (ref 0.6–4.47)
LYMPHOCYTES NFR BLD AUTO: 20 % (ref 14–44)
MCH RBC QN AUTO: 27 PG (ref 26.8–34.3)
MCHC RBC AUTO-ENTMCNC: 29.4 G/DL (ref 31.4–37.4)
MCV RBC AUTO: 92 FL (ref 82–98)
MONOCYTES # BLD AUTO: 0.44 THOUSAND/ΜL (ref 0.17–1.22)
MONOCYTES NFR BLD AUTO: 7 % (ref 4–12)
NEUTROPHILS # BLD AUTO: 4.54 THOUSANDS/ΜL (ref 1.85–7.62)
NEUTS SEG NFR BLD AUTO: 66 % (ref 43–75)
NRBC BLD AUTO-RTO: 0 /100 WBCS
PLATELET # BLD AUTO: 190 THOUSANDS/UL (ref 149–390)
PMV BLD AUTO: 10.3 FL (ref 8.9–12.7)
POTASSIUM SERPL-SCNC: 4.1 MMOL/L (ref 3.5–5.3)
PROT SERPL-MCNC: 6.6 G/DL (ref 6.4–8.2)
PROTHROMBIN TIME: 24.5 SECONDS (ref 11.6–14.5)
RBC # BLD AUTO: 3.74 MILLION/UL (ref 3.88–5.62)
SODIUM SERPL-SCNC: 141 MMOL/L (ref 136–145)
WBC # BLD AUTO: 6.72 THOUSAND/UL (ref 4.31–10.16)

## 2022-02-10 PROCEDURE — 85610 PROTHROMBIN TIME: CPT

## 2022-02-10 PROCEDURE — 36415 COLL VENOUS BLD VENIPUNCTURE: CPT

## 2022-02-10 PROCEDURE — 80053 COMPREHEN METABOLIC PANEL: CPT

## 2022-02-10 PROCEDURE — 85025 COMPLETE CBC W/AUTO DIFF WBC: CPT

## 2022-02-10 RX ORDER — DEXAMETHASONE 4 MG/1
20 TABLET ORAL ONCE
Status: CANCELLED
Start: 2022-03-01 | End: 2022-02-22

## 2022-02-10 RX ORDER — DEXAMETHASONE 4 MG/1
20 TABLET ORAL ONCE
Status: CANCELLED
Start: 2022-02-17 | End: 2022-02-15

## 2022-02-10 RX ORDER — DEXAMETHASONE 4 MG/1
20 TABLET ORAL ONCE
Status: CANCELLED
Start: 2022-03-15 | End: 2022-03-08

## 2022-02-10 RX ORDER — DEXAMETHASONE 4 MG/1
20 TABLET ORAL ONCE
Status: CANCELLED
Start: 2022-03-08 | End: 2022-03-01

## 2022-02-15 ENCOUNTER — ANTICOAG VISIT (OUTPATIENT)
Dept: CARDIOLOGY CLINIC | Facility: CLINIC | Age: 72
End: 2022-02-15

## 2022-02-15 DIAGNOSIS — I48.20 CHRONIC ATRIAL FIBRILLATION (HCC): Primary | ICD-10-CM

## 2022-02-15 LAB — INR PPP: 3.1 (ref 0.84–1.19)

## 2022-02-15 NOTE — PROGRESS NOTES
INR elevated, spoke with Beverly Pinto at [facility], advised will have patient take 4 mg Tues and Thurs, 6 mg other days  Will recheck on 2/24/22  Physician order faxed to [facility] center and [facility]

## 2022-02-17 ENCOUNTER — HOSPITAL ENCOUNTER (OUTPATIENT)
Dept: INFUSION CENTER | Facility: HOSPITAL | Age: 72
Discharge: HOME | End: 2022-02-17
Attending: INTERNAL MEDICINE
Payer: COMMERCIAL

## 2022-02-17 VITALS
HEIGHT: 75 IN | TEMPERATURE: 97 F | WEIGHT: 298 LBS | DIASTOLIC BLOOD PRESSURE: 63 MMHG | OXYGEN SATURATION: 97 % | RESPIRATION RATE: 16 BRPM | BODY MASS INDEX: 37.05 KG/M2 | SYSTOLIC BLOOD PRESSURE: 137 MMHG | HEART RATE: 69 BPM

## 2022-02-17 DIAGNOSIS — C90.00 MULTIPLE MYELOMA NOT HAVING ACHIEVED REMISSION (HCC): Primary | ICD-10-CM

## 2022-02-17 PROCEDURE — 96401 CHEMO ANTI-NEOPL SQ/IM: CPT

## 2022-02-17 RX ORDER — DEXAMETHASONE 4 MG/1
20 TABLET ORAL ONCE
Status: COMPLETED | OUTPATIENT
Start: 2022-02-17 | End: 2022-02-17

## 2022-02-17 RX ADMIN — DEXAMETHASONE 20 MG: 4 TABLET ORAL at 11:29

## 2022-02-17 RX ADMIN — BORTEZOMIB 3.4 MG: 3.5 INJECTION, POWDER, LYOPHILIZED, FOR SOLUTION INTRAVENOUS; SUBCUTANEOUS at 12:07

## 2022-02-17 NOTE — PROGRESS NOTES
Patient received Velcade as per order, left abdomen  AVS provided, patient waiting for ride home in waiting room

## 2022-02-24 ENCOUNTER — ANTICOAG VISIT (OUTPATIENT)
Dept: CARDIOLOGY CLINIC | Facility: CLINIC | Age: 72
End: 2022-02-24

## 2022-02-24 ENCOUNTER — APPOINTMENT (OUTPATIENT)
Dept: LAB | Facility: CLINIC | Age: 72
End: 2022-02-24
Payer: COMMERCIAL

## 2022-02-24 ENCOUNTER — HOSPITAL ENCOUNTER (OUTPATIENT)
Dept: INFUSION CENTER | Facility: HOSPITAL | Age: 72
Discharge: HOME | End: 2022-02-24
Attending: INTERNAL MEDICINE

## 2022-02-24 DIAGNOSIS — C90.00 MULTIPLE MYELOMA NOT HAVING ACHIEVED REMISSION (HCC): ICD-10-CM

## 2022-02-24 DIAGNOSIS — I48.20 CHRONIC ATRIAL FIBRILLATION (HCC): ICD-10-CM

## 2022-02-24 DIAGNOSIS — I48.20 CHRONIC ATRIAL FIBRILLATION (HCC): Primary | ICD-10-CM

## 2022-02-24 LAB
ALBUMIN SERPL BCP-MCNC: 3.3 G/DL (ref 3.5–5)
ALP SERPL-CCNC: 115 U/L (ref 46–116)
ALT SERPL W P-5'-P-CCNC: 22 U/L (ref 12–78)
ANION GAP SERPL CALCULATED.3IONS-SCNC: 6 MMOL/L (ref 4–13)
AST SERPL W P-5'-P-CCNC: 16 U/L (ref 5–45)
BASOPHILS # BLD AUTO: 0.04 THOUSANDS/ΜL (ref 0–0.1)
BASOPHILS NFR BLD AUTO: 1 % (ref 0–1)
BILIRUB SERPL-MCNC: 0.39 MG/DL (ref 0.2–1)
BUN SERPL-MCNC: 42 MG/DL (ref 5–25)
CALCIUM ALBUM COR SERPL-MCNC: 10.1 MG/DL (ref 8.3–10.1)
CALCIUM SERPL-MCNC: 9.5 MG/DL (ref 8.3–10.1)
CHLORIDE SERPL-SCNC: 108 MMOL/L (ref 100–108)
CO2 SERPL-SCNC: 27 MMOL/L (ref 21–32)
CREAT SERPL-MCNC: 1.8 MG/DL (ref 0.6–1.3)
EOSINOPHIL # BLD AUTO: 0.21 THOUSAND/ΜL (ref 0–0.61)
EOSINOPHIL NFR BLD AUTO: 3 % (ref 0–6)
ERYTHROCYTE [DISTWIDTH] IN BLOOD BY AUTOMATED COUNT: 17.6 % (ref 11.6–15.1)
GFR SERPL CREATININE-BSD FRML MDRD: 37 ML/MIN/1.73SQ M
GLUCOSE P FAST SERPL-MCNC: 185 MG/DL (ref 65–99)
HCT VFR BLD AUTO: 36.3 % (ref 36.5–49.3)
HGB BLD-MCNC: 10.7 G/DL (ref 12–17)
IMM GRANULOCYTES # BLD AUTO: 0.11 THOUSAND/UL (ref 0–0.2)
IMM GRANULOCYTES NFR BLD AUTO: 2 % (ref 0–2)
INR PPP: 2.38 (ref 0.84–1.19)
LYMPHOCYTES # BLD AUTO: 1.43 THOUSANDS/ΜL (ref 0.6–4.47)
LYMPHOCYTES NFR BLD AUTO: 21 % (ref 14–44)
MCH RBC QN AUTO: 27.2 PG (ref 26.8–34.3)
MCHC RBC AUTO-ENTMCNC: 29.5 G/DL (ref 31.4–37.4)
MCV RBC AUTO: 92 FL (ref 82–98)
MONOCYTES # BLD AUTO: 0.53 THOUSAND/ΜL (ref 0.17–1.22)
MONOCYTES NFR BLD AUTO: 8 % (ref 4–12)
NEUTROPHILS # BLD AUTO: 4.65 THOUSANDS/ΜL (ref 1.85–7.62)
NEUTS SEG NFR BLD AUTO: 65 % (ref 43–75)
NRBC BLD AUTO-RTO: 0 /100 WBCS
PLATELET # BLD AUTO: 206 THOUSANDS/UL (ref 149–390)
PMV BLD AUTO: 11.5 FL (ref 8.9–12.7)
POTASSIUM SERPL-SCNC: 4.3 MMOL/L (ref 3.5–5.3)
PROT SERPL-MCNC: 6.8 G/DL (ref 6.4–8.2)
PROTHROMBIN TIME: 24.8 SECONDS (ref 11.6–14.5)
RBC # BLD AUTO: 3.93 MILLION/UL (ref 3.88–5.62)
SODIUM SERPL-SCNC: 141 MMOL/L (ref 136–145)
WBC # BLD AUTO: 6.97 THOUSAND/UL (ref 4.31–10.16)

## 2022-02-24 PROCEDURE — 36415 COLL VENOUS BLD VENIPUNCTURE: CPT

## 2022-02-24 PROCEDURE — 85610 PROTHROMBIN TIME: CPT

## 2022-02-24 PROCEDURE — 80053 COMPREHEN METABOLIC PANEL: CPT

## 2022-02-24 PROCEDURE — 85025 COMPLETE CBC W/AUTO DIFF WBC: CPT

## 2022-02-24 NOTE — PROGRESS NOTES
Spoke with wellness center at [facility], INR good will keep on same dose 4 mg Tues Thurs, 6 mg other days    Will recheck in 2 weeks 3/10/2022    Physician order faxed to [facility]

## 2022-03-01 ENCOUNTER — HOSPITAL ENCOUNTER (OUTPATIENT)
Dept: INFUSION CENTER | Facility: HOSPITAL | Age: 72
Discharge: HOME | End: 2022-03-01
Attending: INTERNAL MEDICINE
Payer: COMMERCIAL

## 2022-03-01 VITALS
RESPIRATION RATE: 16 BRPM | OXYGEN SATURATION: 94 % | SYSTOLIC BLOOD PRESSURE: 137 MMHG | BODY MASS INDEX: 37.18 KG/M2 | DIASTOLIC BLOOD PRESSURE: 62 MMHG | HEIGHT: 75 IN | HEART RATE: 67 BPM | WEIGHT: 299 LBS | TEMPERATURE: 97.3 F

## 2022-03-01 DIAGNOSIS — C90.00 MULTIPLE MYELOMA NOT HAVING ACHIEVED REMISSION (HCC): Primary | ICD-10-CM

## 2022-03-01 PROCEDURE — 96401 CHEMO ANTI-NEOPL SQ/IM: CPT

## 2022-03-01 RX ORDER — DEXAMETHASONE 4 MG/1
20 TABLET ORAL ONCE
Status: COMPLETED | OUTPATIENT
Start: 2022-03-01 | End: 2022-03-01

## 2022-03-01 RX ADMIN — DEXAMETHASONE 20 MG: 4 TABLET ORAL at 11:31

## 2022-03-01 RX ADMIN — BORTEZOMIB 3.4 MG: 3.5 INJECTION, POWDER, LYOPHILIZED, FOR SOLUTION INTRAVENOUS; SUBCUTANEOUS at 12:17

## 2022-03-03 ENCOUNTER — HOSPITAL ENCOUNTER (OUTPATIENT)
Dept: INFUSION CENTER | Facility: HOSPITAL | Age: 72
End: 2022-03-03
Attending: INTERNAL MEDICINE

## 2022-03-08 ENCOUNTER — HOSPITAL ENCOUNTER (OUTPATIENT)
Dept: INFUSION CENTER | Facility: HOSPITAL | Age: 72
Discharge: HOME | End: 2022-03-08
Attending: INTERNAL MEDICINE
Payer: COMMERCIAL

## 2022-03-08 VITALS
BODY MASS INDEX: 36.8 KG/M2 | DIASTOLIC BLOOD PRESSURE: 69 MMHG | HEART RATE: 72 BPM | TEMPERATURE: 96.9 F | HEIGHT: 75 IN | SYSTOLIC BLOOD PRESSURE: 125 MMHG | WEIGHT: 295.99 LBS | RESPIRATION RATE: 16 BRPM | OXYGEN SATURATION: 98 %

## 2022-03-08 DIAGNOSIS — C90.00 MULTIPLE MYELOMA NOT HAVING ACHIEVED REMISSION (HCC): Primary | ICD-10-CM

## 2022-03-08 PROCEDURE — 96401 CHEMO ANTI-NEOPL SQ/IM: CPT

## 2022-03-08 RX ORDER — DEXAMETHASONE 4 MG/1
20 TABLET ORAL ONCE
Status: COMPLETED | OUTPATIENT
Start: 2022-03-08 | End: 2022-03-08

## 2022-03-08 RX ADMIN — BORTEZOMIB 3.4 MG: 3.5 INJECTION, POWDER, LYOPHILIZED, FOR SOLUTION INTRAVENOUS; SUBCUTANEOUS at 12:13

## 2022-03-08 RX ADMIN — DEXAMETHASONE 20 MG: 4 TABLET ORAL at 11:46

## 2022-03-08 NOTE — PROGRESS NOTES
Rec'd pt in good spirits, without complaints  Labs meet treatment parameters  Injection to lower R abdomen tolerated well  Discharged to facility Transport

## 2022-03-09 NOTE — PROGRESS NOTES
Hematology/Oncology Outpatient Follow- up Note  Arley Oh 1950, [id number]  3/11/2022        Chief Complaint   Patient presents with    Follow-up       HPI:  Judie Bowman a 74 yo male with multiple comorbidities including diabetes and hypertension with bone marrow biopsy proven multiple myeloma  He was referred to us in 8/2019 after 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  He was noted to have elevated kidney function and anemia which prompted work up for multiple myeloma  His skeletal survey was negative  He underwent bone marrow biopsy confirming multiple myeloma  He was initiated on treatment with with Velcade and Decadron weekly on a 35 day schedule   Velcade is 1 3 milligrams/meter squared and Decadron is 20 mg p o  On days 1, 8, 15, 22      Previous Hematologic/ Oncologic History:    Oncology History   Multiple myeloma ([facility] )   9/16/2019 Initial Diagnosis    Multiple myeloma not having achieved remission ([facility] )     9/24/2019 -  Chemotherapy    iron sucrose (VENOFER), 200 mg (100 % of original dose 200 mg), Intravenous, Once, 1 of 1 cycle  Dose modification: 200 mg (original dose 200 mg, Cycle 1, Reason: Other (Must fill in a comment))  Administration: 200 mg (9/24/2019)  bortezomib (VELCADE), 1 3 mg/m2 = 3 5 mg (81 3 % of original dose 1 6 mg/m2), Subcutaneous, Once, 22 of 27 cycles  Dose modification: 1 3 mg/m2 (original dose 1 6 mg/m2, Cycle 1, Reason: Dose Not Tolerated)  Administration: 3 5 mg (9/24/2019), 3 5 mg (10/1/2019), 3 5 mg (10/8/2019), 3 5 mg (10/15/2019), 3 5 mg (1/6/2020), 3 5 mg (1/13/2020), 3 5 mg (1/20/2020), 3 5 mg (1/27/2020), 3 5 mg (2/11/2020), 3 5 mg (2/17/2020), 3 5 mg (2/24/2020), 3 5 mg (3/2/2020), 3 5 mg (3/16/2020), 3 5 mg (3/23/2020), 3 5 mg (3/30/2020), 3 5 mg (4/6/2020), 3 5 mg (4/20/2020), 3 5 mg (4/27/2020), 3 5 mg (5/4/2020), 3 5 mg (5/11/2020), 3 5 mg (5/26/2020), 3 5 mg (6/1/2020), 3 5 mg (6/8/2020), 3 5 mg (6/15/2020), 3 5 mg (2020), 3 4 mg (2020), 3 4 mg (2020), 3 5 mg (2020), 3 4 mg (2020), 3 4 mg (2020), 3 4 mg (2020), 3 4 mg (10/5/2020), 3 4 mg (2020), 3 4 mg (2020), 3 4 mg (2020), 3 4 mg (2020), 3 4 mg (2020), 3 4 mg (2020), 3 4 mg (2020), 3 4 mg (2021), 3 4 mg (2021), 3 4 mg (2021), 3 4 mg (2/3/2021), 3 4 mg (2021), 3 4 mg (2/15/2021), 3 4 mg (2021), 3 4 mg (3/9/2021), 3 4 mg (3/16/2021), 3 4 mg (3/23/2021), 3 4 mg (3/30/2021), 3 4 mg (2021), 3 4 mg (2021), 3 4 mg (2021), 3 4 mg (2021), 3 5 mg (2021), 3 5 mg (2021), 3 5 mg (2021), 3 5 mg (2021), 3 5 mg (2021), 3 5 mg (2021), 3 5 mg (2021), 3 5 mg (2021), 3 5 mg (2021), 3 5 mg (8/3/2021), 3 5 mg (8/10/2021), 3 5 mg (2021), 3 5 mg (2021), 3 5 mg (2021), 3 5 mg (2021), 3 5 mg (2021), 3 4 mg (2021), 3 4 mg (2021), 3 4 mg (2021), 3 4 mg (2021), 3 4 mg (2021), 3 4 mg (2021), 3 4 mg (2021), 3 4 mg (2021), 3 4 mg (2022), 3 4 mg (2022), 3 4 mg (2022), 3 4 mg (2022), 3 4 mg (2022), 3 4 mg (3/1/2022), 3 4 mg (3/8/2022)         Current Hematologic/ Oncologic Treatment:    Velcade and Decadron    ECO - Symptomatic, <50% confined to bed    Interval History:   The patient presents for routine follow up  He was last seen on 1/10  He denies any change to his overall health since last visit  He reports he is at his baseline today  He received treatment with Velcade and Decadron on 3/8  He continues to tolerate treatments and his Myeloma labs have been stable     Most recent blood work completed on 3/10 was reviewed  Calcium 10, creatinine up to 2 32  CBC acceptable range  Myeloma labs are pending  Patient reports he has not been drinking a lot of fluids, most orange juice and soda  This may account for his elevated creatinine   I did encourage him to drink more water  He needs to avoid high sugar drinks with his h/o diabetes  Should probable see nephrology     Appetite remains good  No N/V  Still having intermittent abdominal pain  Pain relatively same  Not worse  Had some diarrhea yesterday  Seeing GI next week    Denies any bony pain  Cancer Staging:  Cancer Staging  No matching staging information was found for the patient  Molecular Testing:         Test Results:    Imaging: No results found  Labs:   Lab Results   Component Value Date    WBC 15 83 (H) 03/10/2022    HGB 11 1 (L) 03/10/2022    HCT 36 9 03/10/2022    MCV 90 03/10/2022     03/10/2022     Lab Results   Component Value Date     01/15/2018    K 4 5 03/10/2022     03/10/2022    CO2 25 03/10/2022    ANIONGAP 11 12/22/2015    BUN 61 (H) 03/10/2022    CREATININE 2 32 (H) 03/10/2022    GLUCOSE 195 (H) 08/30/2020    GLUF 185 (H) 02/24/2022    CALCIUM 10 0 03/10/2022    CORRECTEDCA 10 1 02/24/2022    AST 13 03/10/2022    ALT 19 03/10/2022    ALKPHOS 108 03/10/2022    PROT 7 1 01/15/2018    BILITOT 0 6 01/15/2018    EGFR 27 03/10/2022         Review of Systems   Gastrointestinal: Positive for abdominal pain and diarrhea  Musculoskeletal: Positive for gait problem  All other systems reviewed and are negative           Active Problems:   Patient Active Problem List   Diagnosis    Diabetic foot ulcer [facility] )    Diabetes mellitus type 2, uncontrolled ([facility] )    Chronic venous hypertension, right    Essential hypertension    Chronic atrial fibrillation (HCC)    Morbid obesity ([address] )    Acute on chronic combined systolic and diastolic congestive heart failure (HCC)    Cardiomyopathy ([facility] )    Diabetes, polyneuropathy ([facility] )    GERD (gastroesophageal reflux disease)    Hyperlipidemia    Onychomycosis    Gallstones    Localized edema    Peripheral vascular disease (HCC)    SOB (shortness of breath)    NALLELY (obstructive sleep apnea)    Moderate persistent asthma without complication    DORA (acute kidney injury) ([place] [address] )    Multiple myeloma ([place] [address] )    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)    Hiatal hernia    Weakness    Type 2 acute myocardial infarction (HCC)    Chronic obstructive pulmonary disease, unspecified ([place] [address] )    Hypertensive chronic kidney disease w stg 1-4/unsp chr kdny    Mass of psoas muscle    CKD (chronic kidney disease)    Chronic diastolic (congestive) heart failure (HCC)    Lymphedema    Rash    Difficulty in walking, not elsewhere classified    Gout    Venous insufficiency (chronic) (peripheral)    Sepsis due to COVID-19 pneumonia ([place] [address] )    Fall from bed    Need for influenza vaccination    Venous hypertension, chronic, with ulcer and inflammation, right (HCC)    Traumatic skin ulcer ([place] [address] )    Ambulatory dysfunction    Anemia    Supratherapeutic INR       Past Medical History:   Past Medical History:   Diagnosis Date    Cancer ([place] [address] )     CHF (congestive heart failure) (HCC)     Chronic kidney disease     COPD (chronic obstructive pulmonary disease) ([place] [address] )     COVID-19     Decubitus ulcer of heel     LAST ASSESSED [id number]    Diabetes mellitus ([place] [address] )     History of varicose veins     Hypertension     Intermittent claudication (HCC)     Neuropathy     Seasonal allergies        Surgical History:   Past Surgical History:   Procedure Laterality Date    AMPUTATION ABOVE KNEE (AKA) Left 7/31/2019    Procedure: AMPUTATION ABOVE KNEE (AKA);   Surgeon: Dana Albert MD;  Location:  MAIN OR;  Service: General    ANGIOPLASTY      W/ FLUOROSC ANGIOGRAPGY PERIPHERAL ARTERY ADDITIONAL  LAST ASSESSED [id number]    ANGIOPLASTY / STENTING FEMORAL      TANSCATH INTRAVASCULAR STENT PLACEMENT PERCUTANEOUS FEMORAL     COLONOSCOPY  2010    CT BONE MARROW BIOPSY AND ASPIRATION  8/9/2019    FULL THICKNESS SKIN GRAFT      TENDON REPAIR      TOE AMPUTATION Left 12/27/2018    Procedure: AMPUTATION left 4th TOE;  Surgeon: Lianne Delaney Deondre Aaron DPM;  Location: [facility] OR;  Service: Podiatry       Family History:    Family History   Problem Relation Age of Onset    Other Mother         CARDIAC DISORDER     Diabetes Mother     Cancer Father     Other Family         CARDIAC DISORDER     Diabetes Family     Cancer Family     Mental illness Family     Kidney disease Sister     Diabetes Sister        Cancer-related family history includes Cancer in his family and father      Social History:   Social History     Socioeconomic History    Marital status:      Spouse name: Not on file    Number of children: 1    Years of education: Not on file    Highest education level: Not on file   Occupational History    Occupation: RETIRED   Tobacco Use    Smoking status: Never Smoker    Smokeless tobacco: Never Used   Vaping Use    Vaping Use: Never used   Substance and Sexual Activity    Alcohol use: Not Currently    Drug use: Not Currently    Sexual activity: Not Currently   Other Topics Concern    Not on file   Social History Narrative    DENIED [address]    FEELS SAFE AT HOME    LIVES WITH FAMILY - SISTER    NO LIVING WILL    POA IN EXISTENCE     SUPPORTIVE AND SAFE    One son, 2 granddaughters     Social Determinants of Health     Financial Resource Strain: Not on file   Food Insecurity: Not on file   Transportation Needs: Not on file   Physical Activity: Not on file   Stress: Not on file   Social Connections: Not on file   Intimate Partner Violence: Not on file   Housing Stability: Not on file       Current Medications:   Current Outpatient Medications   Medication Sig Dispense Refill    acetaminophen (TYLENOL) 500 mg tablet Take 1 tablet (500 mg total) by mouth every 6 (six) hours as needed for mild pain, headaches or fever 90 tablet 1    albuterol (PROVENTIL HFA,VENTOLIN HFA) 90 mcg/act inhaler Inhale 2 puffs every 6 (six) hours as needed for wheezing 1 Inhaler 0    Alcohol Swabs (ALCOHOL PREP) 70 % PADS   0    allopurinol (ZYLOPRIM) 300 mg tablet TAKE ONE TABLET BY MOUTH DAILY (Patient taking differently: 300 mg  ) 30 tablet 5    ammonium lactate (LAC-HYDRIN) 12 % lotion APPLY TO BLE TOPICALLY DAILY 400 g 2    atorvastatin (LIPITOR) 40 mg tablet Take 1 tablet (40 mg total) by mouth daily after dinner 30 tablet 0    BD AUTOSHIELD DUO 30G X 5 MM MISC USE AS DIRECTED WITH INSULIN FOUR TIMES A  each 3    BD INSULIN SYRINGE U/F 31G X 5/16" 0 5 ML MISC USE AS DIRECTED WITH NOVOLIN 70/30  0    BD PEN NEEDLE HILARIO U/F 32G X 4 MM MISC by Other route 3 (three) times a day 300 each 1    bisacodyl (bisacodyl) 5 mg EC tablet Take 5 mg by mouth daily as needed for constipation      bisacodyl (DULCOLAX) 10 mg suppository Insert 10 mg into the rectum as needed for constipation      bortezomib (VELCADE) Infuse into a venous catheter once        clotrimazole (LOTRIMIN) 1 % cream APPLY TO BUTTOCK 2 TIMES DAILY 45 g 3    colchicine (COLCRYS) 0 6 mg tablet Take 1 tablet (0 6 mg total) by mouth daily 2 tablet 0    docusate sodium (COLACE) 100 mg capsule Take 1 capsule (100 mg total) by mouth 2 (two) times a day  0    Easy Touch Safety Lancets 28G MISC USE 4 TIMES DAILY TO TEST BLOOD SUGAR 100 each 5    fluticasone-salmeterol (ADVAIR DISKUS, WIXELA INHUB) 250-50 mcg/dose inhaler Inhale 1 puff 2 (two) times a day Rinse mouth after use   1 Inhaler 5    insulin glargine (Lantus SoloStar) 100 units/mL injection pen Inject 22 Units under the skin daily  0    liraglutide (Victoza) injection Inject 1 8 mg under the skin daily      loperamide (IMODIUM) 2 mg capsule Take 2 mg by mouth 4 (four) times a day as needed for diarrhea      magnesium hydroxide (MILK OF MAGNESIA) 400 mg/5 mL oral suspension Take 30 mL by mouth daily as needed for constipation      Melatonin 5 MG TABS TAKE ONE TABLET BY MOUTH EVERY NIGHT AT BEDTIME 30 tablet 2    metFORMIN (GLUCOPHAGE) 1000 MG tablet TAKE ONE TABLET BY MOUTH TWO TIMES A DAY (Patient taking differently: Take 1,000 mg by mouth daily at bedtime ) 60 tablet 2    metoprolol succinate (TOPROL-XL) 25 mg 24 hr tablet Take 1 tablet (25 mg total) by mouth daily  0    multivitamin-minerals therapeutic (THERA-M)       NOVOLOG FLEXPEN 100 units/mL SOPN INJ 5U BEFORE MEALS AND PER SS <250=NO CALL 250-300=5U,301-350= 10U,351-400=15U,401-450=20U>451 CALL FOR ORDERS UP TO 4X PER DAY 15 mL 5    ondansetron (ZOFRAN) 4 mg tablet ondansetron HCl 4 mg tablet   TAKE 1 TABLET BY MOUTH EVERY 8 HOURS AS NEEDED      polyethylene glycol (MIRALAX) 17 g packet Take 17 g by mouth daily  0    potassium chloride (K-DUR,KLOR-CON) 20 mEq tablet Take 2 tablets (40 mEq total) by mouth daily  0    torsemide (DEMADEX) 20 mg tablet Take 2 tablets (40 mg total) by mouth 2 (two) times a day  0    warfarin (COUMADIN) 1 mg tablet Take 1 mg by mouth daily at bedtime 2 mg on Wed and Sun      warfarin (COUMADIN) 6 mg tablet        No current facility-administered medications for this visit  Allergies: Allergies   Allergen Reactions    Latex Rash       Physical Exam:  /84 (BP Location: Left arm, Patient Position: Sitting, Cuff Size: Adult)   Pulse 84   Temp 97 6 °F (36 4 °C) (Temporal)   Resp 15   SpO2 99%   There is no height or weight on file to calculate BSA  Wt Readings from Last 3 Encounters:   03/08/22 134 kg (295 lb 15 8 oz)   03/01/22 136 kg (299 lb)   02/17/22 135 kg (298 lb)           Physical Exam  Musculoskeletal:         General: Deformity (L BKA) present  Assessment / Plan:    1  Multiple myeloma not having achieved remission [facility])    The patient is a very pleasant 72 yo male with multiple comorbidities including diabetes and hypertension with bone marrow biopsy proven multiple myeloma  He was referred to us in 8/2019 after 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   He was noted to have elevated kidney function and anemia which prompted work up for multiple myeloma  His skeletal survey was negative  He underwent bone marrow biopsy confirming multiple myeloma  He was initiated on treatment with with Velcade and Decadron weekly on a 35 day schedule  Velcade is 1 3 milligrams/meter squared and Decadron is 20 mg p o  On days 1, 8, 15, 22  Skeletal survey from 11/2020 showed no evidence of lytic bony lesions  CT CAP done during his last hospitalization on 10/4/21 showed no acute osseous pathology     He continues to tolerate his treatment well  His CBC remains in acceptable range  Myeloma labs are pending at time of visit but have been stable  His BUN/Creatinine are elevated  He admits to not drinking a lot of fluids and has mostly been drinking OJ and soda  It is likely his creatinine is more elevated d/t this but I have recommended a nephrology evaluation  He is encouraged to drink less sugar drinks, more water  Patient verbalized understanding    He will continue with current treatment regimen without change  He will return for follow up in 2 months with repeat blood work  He will have labs done prior to treatments as planned  Patient was in agreement with plan of care  He was instructed to call with any questions or concerns prior to his next office visit         Goals and Barriers:  Current Goal:  Prolong Survival from multiple myeloma   Barriers: None  Patient's Capacity to Self Care:  Patient able to self care  Portions of the record may have been created with voice recognition software  Occasional wrong word or "sound a like" substitutions may have occurred due to the inherent limitations of voice recognition software  Read the chart carefully and recognize, using context, where substitutions have occurred

## 2022-03-10 ENCOUNTER — TELEPHONE (OUTPATIENT)
Dept: HEMATOLOGY ONCOLOGY | Facility: CLINIC | Age: 72
End: 2022-03-10

## 2022-03-10 ENCOUNTER — HOSPITAL ENCOUNTER (OUTPATIENT)
Dept: INFUSION CENTER | Facility: HOSPITAL | Age: 72
End: 2022-03-10
Attending: INTERNAL MEDICINE

## 2022-03-10 ENCOUNTER — ANTICOAG VISIT (OUTPATIENT)
Dept: CARDIOLOGY CLINIC | Facility: CLINIC | Age: 72
End: 2022-03-10

## 2022-03-10 ENCOUNTER — APPOINTMENT (OUTPATIENT)
Dept: LAB | Facility: CLINIC | Age: 72
End: 2022-03-10
Payer: COMMERCIAL

## 2022-03-10 DIAGNOSIS — C90.00 MULTIPLE MYELOMA NOT HAVING ACHIEVED REMISSION (HCC): ICD-10-CM

## 2022-03-10 DIAGNOSIS — I48.20 CHRONIC ATRIAL FIBRILLATION (HCC): Primary | ICD-10-CM

## 2022-03-10 LAB
ALBUMIN SERPL BCP-MCNC: 3.6 G/DL (ref 3.5–5)
ALP SERPL-CCNC: 108 U/L (ref 46–116)
ALT SERPL W P-5'-P-CCNC: 19 U/L (ref 12–78)
ANION GAP SERPL CALCULATED.3IONS-SCNC: 8 MMOL/L (ref 4–13)
AST SERPL W P-5'-P-CCNC: 13 U/L (ref 5–45)
BASOPHILS # BLD AUTO: 0.03 THOUSANDS/ΜL (ref 0–0.1)
BASOPHILS NFR BLD AUTO: 0 % (ref 0–1)
BILIRUB SERPL-MCNC: 0.53 MG/DL (ref 0.2–1)
BUN SERPL-MCNC: 61 MG/DL (ref 5–25)
CALCIUM SERPL-MCNC: 10 MG/DL (ref 8.3–10.1)
CHLORIDE SERPL-SCNC: 106 MMOL/L (ref 100–108)
CO2 SERPL-SCNC: 25 MMOL/L (ref 21–32)
CREAT SERPL-MCNC: 2.32 MG/DL (ref 0.6–1.3)
EOSINOPHIL # BLD AUTO: 0 THOUSAND/ΜL (ref 0–0.61)
EOSINOPHIL NFR BLD AUTO: 0 % (ref 0–6)
ERYTHROCYTE [DISTWIDTH] IN BLOOD BY AUTOMATED COUNT: 17.6 % (ref 11.6–15.1)
GFR SERPL CREATININE-BSD FRML MDRD: 27 ML/MIN/1.73SQ M
GLUCOSE SERPL-MCNC: 234 MG/DL (ref 65–140)
HCT VFR BLD AUTO: 36.9 % (ref 36.5–49.3)
HGB BLD-MCNC: 11.1 G/DL (ref 12–17)
IGA SERPL-MCNC: 174 MG/DL (ref 70–400)
IGA SERPL-MCNC: 182 MG/DL (ref 70–400)
IGG SERPL-MCNC: 520 MG/DL (ref 700–1600)
IGG SERPL-MCNC: 546 MG/DL (ref 700–1600)
IGM SERPL-MCNC: 22 MG/DL (ref 40–230)
IGM SERPL-MCNC: 25 MG/DL (ref 40–230)
IMM GRANULOCYTES # BLD AUTO: 0.29 THOUSAND/UL (ref 0–0.2)
IMM GRANULOCYTES NFR BLD AUTO: 2 % (ref 0–2)
LYMPHOCYTES # BLD AUTO: 1.71 THOUSANDS/ΜL (ref 0.6–4.47)
LYMPHOCYTES NFR BLD AUTO: 11 % (ref 14–44)
MCH RBC QN AUTO: 27.1 PG (ref 26.8–34.3)
MCHC RBC AUTO-ENTMCNC: 30.1 G/DL (ref 31.4–37.4)
MCV RBC AUTO: 90 FL (ref 82–98)
MONOCYTES # BLD AUTO: 0.61 THOUSAND/ΜL (ref 0.17–1.22)
MONOCYTES NFR BLD AUTO: 4 % (ref 4–12)
NEUTROPHILS # BLD AUTO: 13.19 THOUSANDS/ΜL (ref 1.85–7.62)
NEUTS SEG NFR BLD AUTO: 83 % (ref 43–75)
NRBC BLD AUTO-RTO: 0 /100 WBCS
PLATELET # BLD AUTO: 184 THOUSANDS/UL (ref 149–390)
PMV BLD AUTO: 11.3 FL (ref 8.9–12.7)
POTASSIUM SERPL-SCNC: 4.5 MMOL/L (ref 3.5–5.3)
PROT SERPL-MCNC: 7 G/DL (ref 6.4–8.2)
RBC # BLD AUTO: 4.09 MILLION/UL (ref 3.88–5.62)
SODIUM SERPL-SCNC: 139 MMOL/L (ref 136–145)
WBC # BLD AUTO: 15.83 THOUSAND/UL (ref 4.31–10.16)

## 2022-03-10 PROCEDURE — 85025 COMPLETE CBC W/AUTO DIFF WBC: CPT

## 2022-03-10 PROCEDURE — 82232 ASSAY OF BETA-2 PROTEIN: CPT

## 2022-03-10 PROCEDURE — 84165 PROTEIN E-PHORESIS SERUM: CPT

## 2022-03-10 PROCEDURE — 84165 PROTEIN E-PHORESIS SERUM: CPT | Performed by: PATHOLOGY

## 2022-03-10 PROCEDURE — 80053 COMPREHEN METABOLIC PANEL: CPT

## 2022-03-10 PROCEDURE — 83521 IG LIGHT CHAINS FREE EACH: CPT

## 2022-03-10 PROCEDURE — 82784 ASSAY IGA/IGD/IGG/IGM EACH: CPT

## 2022-03-10 NOTE — TELEPHONE ENCOUNTER
Appointment Confirmation (to confirm pre existing appointments - ONLY)     Appointment with  Iraida Zaldivar   Appointment date & time 3/11/22 @ 11:30am   Location [place]   Patient verbilized Understanding yes

## 2022-03-11 ENCOUNTER — TELEPHONE (OUTPATIENT)
Dept: NEPHROLOGY | Facility: HOSPITAL | Age: 72
End: 2022-03-11

## 2022-03-11 ENCOUNTER — OFFICE VISIT (OUTPATIENT)
Dept: HEMATOLOGY ONCOLOGY | Facility: HOSPITAL | Age: 72
End: 2022-03-11
Payer: COMMERCIAL

## 2022-03-11 ENCOUNTER — TELEPHONE (OUTPATIENT)
Dept: HEMATOLOGY ONCOLOGY | Facility: HOSPITAL | Age: 72
End: 2022-03-11

## 2022-03-11 VITALS
DIASTOLIC BLOOD PRESSURE: 84 MMHG | OXYGEN SATURATION: 99 % | HEART RATE: 84 BPM | RESPIRATION RATE: 15 BRPM | TEMPERATURE: 97.6 F | SYSTOLIC BLOOD PRESSURE: 126 MMHG

## 2022-03-11 DIAGNOSIS — C90.00 MULTIPLE MYELOMA NOT HAVING ACHIEVED REMISSION (HCC): Primary | ICD-10-CM

## 2022-03-11 LAB
ALBUMIN SERPL ELPH-MCNC: 3.79 G/DL (ref 3.5–5)
ALBUMIN SERPL ELPH-MCNC: 58.3 % (ref 52–65)
ALPHA1 GLOB SERPL ELPH-MCNC: 0.35 G/DL (ref 0.1–0.4)
ALPHA1 GLOB SERPL ELPH-MCNC: 5.4 % (ref 2.5–5)
ALPHA2 GLOB SERPL ELPH-MCNC: 1.06 G/DL (ref 0.4–1.2)
ALPHA2 GLOB SERPL ELPH-MCNC: 16.3 % (ref 7–13)
B2 MICROGLOB SERPL-MCNC: 4.8 MG/L (ref 0.6–2.4)
BETA GLOB ABNORMAL SERPL ELPH-MCNC: 0.42 G/DL (ref 0.4–0.8)
BETA1 GLOB SERPL ELPH-MCNC: 6.5 % (ref 5–13)
BETA2 GLOB SERPL ELPH-MCNC: 5.6 % (ref 2–8)
BETA2+GAMMA GLOB SERPL ELPH-MCNC: 0.36 G/DL (ref 0.2–0.5)
GAMMA GLOB ABNORMAL SERPL ELPH-MCNC: 0.51 G/DL (ref 0.5–1.6)
GAMMA GLOB SERPL ELPH-MCNC: 7.9 % (ref 12–22)
IGG/ALB SER: 1.4 {RATIO} (ref 1.1–1.8)
KAPPA LC FREE SER-MCNC: 23 MG/L (ref 3.3–19.4)
KAPPA LC FREE/LAMBDA FREE SER: 1.61 {RATIO} (ref 0.26–1.65)
LAMBDA LC FREE SERPL-MCNC: 14.3 MG/L (ref 5.7–26.3)
PROT PATTERN SERPL ELPH-IMP: ABNORMAL
PROT SERPL-MCNC: 6.5 G/DL (ref 6.4–8.2)

## 2022-03-11 PROCEDURE — 99215 OFFICE O/P EST HI 40 MIN: CPT | Performed by: NURSE PRACTITIONER

## 2022-03-11 NOTE — TELEPHONE ENCOUNTER
Left message on phone for Andreia Samuels to call back  Regarding Nephrology appointment  Also called nursing facility and spoke with Rogelio Hargrove regarding patient's appointment needs  She asked me to fax the appointment summary as well as all testing needed, which I will do

## 2022-03-15 ENCOUNTER — HOSPITAL ENCOUNTER (OUTPATIENT)
Dept: INFUSION CENTER | Facility: HOSPITAL | Age: 72
Discharge: HOME | End: 2022-03-15
Attending: INTERNAL MEDICINE
Payer: COMMERCIAL

## 2022-03-15 ENCOUNTER — TELEPHONE (OUTPATIENT)
Dept: HEMATOLOGY ONCOLOGY | Facility: CLINIC | Age: 72
End: 2022-03-15

## 2022-03-15 VITALS
HEIGHT: 75 IN | TEMPERATURE: 96.8 F | BODY MASS INDEX: 36.8 KG/M2 | HEART RATE: 69 BPM | OXYGEN SATURATION: 100 % | SYSTOLIC BLOOD PRESSURE: 116 MMHG | DIASTOLIC BLOOD PRESSURE: 63 MMHG | WEIGHT: 296 LBS | RESPIRATION RATE: 14 BRPM

## 2022-03-15 DIAGNOSIS — N39.0 URINARY TRACT INFECTION WITHOUT HEMATURIA, SITE UNSPECIFIED: ICD-10-CM

## 2022-03-15 DIAGNOSIS — D72.829 LEUKOCYTOSIS, UNSPECIFIED TYPE: ICD-10-CM

## 2022-03-15 DIAGNOSIS — C90.00 MULTIPLE MYELOMA NOT HAVING ACHIEVED REMISSION (HCC): Primary | ICD-10-CM

## 2022-03-15 DIAGNOSIS — N39.0 URINARY TRACT INFECTION WITHOUT HEMATURIA, SITE UNSPECIFIED: Primary | ICD-10-CM

## 2022-03-15 LAB
BILIRUB UR QL STRIP: NEGATIVE
CLARITY UR: CLEAR
COLOR UR: YELLOW
GLUCOSE UR STRIP-MCNC: NEGATIVE MG/DL
HGB UR QL STRIP.AUTO: NEGATIVE
KETONES UR STRIP-MCNC: NEGATIVE MG/DL
LEUKOCYTE ESTERASE UR QL STRIP: NEGATIVE
NITRITE UR QL STRIP: NEGATIVE
PH UR STRIP.AUTO: 5.5 [PH]
PROT UR STRIP-MCNC: NEGATIVE MG/DL
SP GR UR STRIP.AUTO: 1.02 (ref 1–1.03)
UROBILINOGEN UR QL STRIP.AUTO: 0.2 E.U./DL

## 2022-03-15 PROCEDURE — 81003 URINALYSIS AUTO W/O SCOPE: CPT | Performed by: NURSE PRACTITIONER

## 2022-03-15 PROCEDURE — 96401 CHEMO ANTI-NEOPL SQ/IM: CPT

## 2022-03-15 RX ORDER — DEXAMETHASONE 4 MG/1
20 TABLET ORAL ONCE
Status: COMPLETED | OUTPATIENT
Start: 2022-03-15 | End: 2022-03-15

## 2022-03-15 RX ADMIN — DEXAMETHASONE 20 MG: 4 TABLET ORAL at 11:46

## 2022-03-15 RX ADMIN — BORTEZOMIB 3.4 MG: 3.5 INJECTION, POWDER, LYOPHILIZED, FOR SOLUTION INTRAVENOUS; SUBCUTANEOUS at 12:07

## 2022-03-15 NOTE — PROGRESS NOTES
Reviewed BUN of 61, Creatinine 2 32 and WBC of 15 8 with Steph PETERSEN  Per [facility] okay to treat today  Patient should have UA obtained and follow up with Nephrology  Melissa Saucedo RN stated patient has no current symptoms of infection  Ruiz Fontanez RN was notified of plan

## 2022-03-15 NOTE — TELEPHONE ENCOUNTER
Appointment Confirmation (to confirm pre existing appointments - ONLY)     Appointment with  Nura Meng   Appointment date & time 5/16/22 @ 10:30   Location [place]   Patient verbilized Understanding yes

## 2022-03-15 NOTE — PLAN OF CARE
Problem: Potential for Falls  Goal: Patient will remain free of falls  Description: INTERVENTIONS:  - Educate patient/family on patient safety including physical limitations  - Instruct patient to call for assistance with activity   - Keep Call bell within reach  - Keep bed low and locked with side rails adjusted as appropriate  - Keep care items and personal belongings within reach  - Initiate and maintain comfort rounds  - Consider moving patient to room near nurses station  Outcome: Progressing     Problem: Knowledge Deficit  Goal: Patient/family/caregiver demonstrates understanding of disease process, treatment plan, medications, and discharge instructions  Description: Complete learning assessment and assess knowledge base    Interventions:  - Provide teaching at level of understanding  - Provide teaching via preferred learning methods  Outcome: Progressing

## 2022-03-15 NOTE — PROGRESS NOTES
Pt here today for chemo injection  Tolerated well no adverse reaction  Urine specimen collected as per order d/t communication via TEAMs to [facility] regarding elevated BUN, Creatinine and WBC  AVS provided and pt left via [facility] with  waiting

## 2022-03-16 ENCOUNTER — TELEPHONE (OUTPATIENT)
Dept: GASTROENTEROLOGY | Facility: CLINIC | Age: 72
End: 2022-03-16

## 2022-03-16 ENCOUNTER — CONSULT (OUTPATIENT)
Dept: GASTROENTEROLOGY | Facility: CLINIC | Age: 72
End: 2022-03-16
Payer: COMMERCIAL

## 2022-03-16 VITALS
TEMPERATURE: 98.1 F | HEART RATE: 80 BPM | RESPIRATION RATE: 18 BRPM | DIASTOLIC BLOOD PRESSURE: 68 MMHG | BODY MASS INDEX: 37 KG/M2 | OXYGEN SATURATION: 98 % | SYSTOLIC BLOOD PRESSURE: 128 MMHG | HEIGHT: 75 IN

## 2022-03-16 DIAGNOSIS — Z12.11 COLON CANCER SCREENING: ICD-10-CM

## 2022-03-16 DIAGNOSIS — R10.10 UPPER ABDOMINAL PAIN: Primary | ICD-10-CM

## 2022-03-16 DIAGNOSIS — K86.89 PANCREATIC CALCIFICATION: ICD-10-CM

## 2022-03-16 DIAGNOSIS — K59.00 CONSTIPATION, UNSPECIFIED CONSTIPATION TYPE: ICD-10-CM

## 2022-03-16 PROCEDURE — 1160F RVW MEDS BY RX/DR IN RCRD: CPT | Performed by: PHYSICIAN ASSISTANT

## 2022-03-16 PROCEDURE — 1036F TOBACCO NON-USER: CPT | Performed by: PHYSICIAN ASSISTANT

## 2022-03-16 PROCEDURE — 99204 OFFICE O/P NEW MOD 45 MIN: CPT | Performed by: PHYSICIAN ASSISTANT

## 2022-03-16 RX ORDER — OMEPRAZOLE 40 MG/1
40 CAPSULE, DELAYED RELEASE ORAL DAILY
Qty: 30 CAPSULE | Refills: 2 | Status: SHIPPED | OUTPATIENT
Start: 2022-03-16

## 2022-03-16 NOTE — PATIENT INSTRUCTIONS
Only use colace as needed for constipation; only use imodium as needed for diarrhea  Do not use together  Diet for GERD  WHAT YOU NEED TO KNOW:   A diet for stomach ulcers and gastritis is a meal plan that limits foods that irritate your stomach  Certain foods may worsen symptoms such as stomach pain, bloating, heartburn, or indigestion  DISCHARGE INSTRUCTIONS:   Foods to limit or avoid:  You may need to avoid acidic, spicy, or high-fat foods  Not all foods affect everyone the same way  You will need to learn which foods worsen your symptoms and limit those foods  The following are some foods that may worsen ulcer or gastritis symptoms:  · Beverages:      ? Whole milk and chocolate milk    ? Hot cocoa and cola    ? Any beverage with caffeine    ? Regular and decaffeinated coffee    ? Peppermint and spearmint tea    ? Green and black tea, with or without caffeine    ? Orange and grapefruit juices    ? Drinks that contain alcohol    · Spices and seasonings:      ? Black and red pepper    ? Chili powder    ? Mustard seed and nutmeg    · Other foods:      ? Dairy foods made from whole milk or cream    ? Chocolate    ? Spicy or strongly flavored cheeses, such as jalapeno or black pepper    ? Highly seasoned, high-fat meats, such as sausage, salami, roberts, ham, and cold cuts    ? Hot chiles and peppers    ? Tomato products, such as tomato paste, tomato sauce, or tomato juice    Foods to include:  Eat a variety of healthy foods from all the food groups  Eat fruits, vegetables, whole grains, and fat-free or low-fat dairy foods  Whole grains include whole-wheat breads, cereals, pasta, and brown rice  Choose lean meats, poultry (chicken and turkey), fish, beans, eggs, and nuts  A healthy meal plan is low in unhealthy fats, salt, and added sugar  Healthy fats include olive oil and canola oil  Ask your dietitian for more information about a healthy diet  Other helpful guidelines:   · Do not eat right before bedtime  Stop eating at least 2 hours before bedtime  · Eat small, frequent meals  Your stomach may tolerate small, frequent meals better than large meals  © Copyright [employer] 2022 Information is for End User's use only and may not be sold, redistributed or otherwise used for commercial purposes  All illustrations and images included in CareNotes® are the copyrighted property of A D A M , Inc  or [address] [place] [address]  The above information is an  only  It is not intended as medical advice for individual conditions or treatments  Talk to your doctor, nurse or pharmacist before following any medical regimen to see if it is safe and effective for you        Scheduled date of EGD/colonoscopy/EUS (as of today):05 06 22  Physician performing EGD/colonoscopy:DR D[facility]  Location of EGD/colonoscopy:[facility]  Desired bowel prep reviewed with patient:RY/GAVINO  Instructions reviewed with patient by:JAROD IN THE OFFICE  Clearances:  COUMADIN    U/S scheduled 03 21 22 @[facility]

## 2022-03-16 NOTE — TELEPHONE ENCOUNTER
Our mutual patient is scheduled for procedure: On: 05 06 22      With: Dr Ayaz Decker    He is taking the following blood thinner:  Coumadin    Can this be stopped ___5___ days prior to the procedure?       Physician Approving clearance: ________________________

## 2022-03-16 NOTE — PROGRESS NOTES
[facility] - Outpatient Consultation  Loan Brand 73 y o  male MRN: [id number]  Encounter: [id number]          ASSESSMENT AND PLAN:      Santino Hernandez is a 72 y/o male with multiple myeloma, HTN, A  Fib on warafarin, HLD, DM2 on insulin, CHF, CKD, COPD and is s/p L AKA who presents for consultation for LUQ pain  1 Chronic LUQ pain  Pt has had constant LUQ pain x 2-3 yrs that occurs daily and without identifiable triggers; pt says the pain is not severe and is more of a "dull ache " Last EGD 2010 noted persistent ulcer at GE junction  Pt denies NSAID use    -abdominal u/s ordered to further evaluate the region   -EGD w biopsies ordered to rule out H pylori, ulcer disease, hiatal hernia, celiac disease  -start omeprazole 40 mg half an hour prior to breakfast   -I explained to pt that I am unsure if the omeprazole is going to help as etiology of this pain is still unknown; if pain remains after being on PPI for at least 2 weeks, we can try bentyl instead     2  Alternating bowel habits  3  Colon cancer screening   Pt says that his bowel habits alternating between constipation and diarrhea quite often; pt notes that he is taking both imodium and colace daily  Pt is not taking miralax and is unsure if he has ever tried this in the past  Pt denies overt GI bleeding  last colon 2010 noted diverticulosis and hemorrhoids  Last colon 2010 noted diverticulosis and hemorrhoids    -explained to pt that being on imodium and colace daily is defeating the purpose of both; I asked pt to stop taking both daily as this is likely a large component of his irregular bowel habits   -TSH ordered; EGD will help to  rule out celiac   -take imodium PRN diarrhea ONLY  -take colace PRN constipation ONLY  -colonoscopy ordered; instructions given; risks and benefits reviewed   -I asked pt to call us in 2 weeks to update us: if he starts taking imodium and colace as directed and continued to have alternating bowel habits, he should be started on fiber supplement at that time     4  Pancreatic calcification on imaging  Pt has calcification noted on imaging of pancreatic head for many yrs with most recent CTA 10/2021 noting stable small calcification  Pt underwent MRCP in 2019 with depicted normal pancreas and did not comment on lesion  It does not appear pt has ever undergone EUS  Pt denies any hx of pancreatitis of pancreatic abnormalities; denies alcohol, tobacco, illicit drug use  -I explained to pt that although his pancreatic calcification is likely benign, pt could have underlying chronic pancreatitis; thus further work-up is warranted   -I explained to pt that it would be ideal to do MRI with contrast but due to his underlying CKD and the fact that he has never had EUS, he warrants EUS at this time  -EUS schedule to be done at the same roldan as EGD/colon for further evaluation of pancreatic evaluation       ______________________________________________________________________    HPI:  Lilo Stanley is a 74 y/o male with multiple myeloma, HTN, A  Fib on warafarin, HLD, DM2 on insulin, CHF, CKD, COPD and is s/p L AKA who presents for consultation for LUQ Pain  Pt says that for the last 2-3 years, he has had daily and constant LUQ Pain  Pt notes that his pain is not severe and is more of a dull ache, but notes that it is bothersome to him as it is "Always there " Pt denies trauma or injury to the area  Pt says he has not been able to identify any specific triggers of eating/drinking or movement  Pt denies heartburn, n/v, dysphagia, odynophagia, lower abdominal pain  Pt denies NSAID use  Pt also notes alternating bowel habits that have been ongoing for "a while " Pt says that he takes imodium and colace every day and says he is unsure which is for what  Pt denies bloody or black BMs, unintentional weight loss, fevers, chills, night sweats  Pt denies family hx of colon cancer  Pt also denies alcohol, tobacco, illicit drug use    Pt denies any hx of pancreatitis of pancreatic abnormality  Pt denies prior EUS  Pt denies prior abdominal surgical hx  REVIEW OF SYSTEMS:    CONSTITUTIONAL: Denies any fever, chills, rigors, and weight loss  HEENT: No earache or tinnitus  Denies hearing loss or visual disturbances  CARDIOVASCULAR: No chest pain or palpitations  RESPIRATORY: Denies any cough, hemoptysis, shortness of breath or dyspnea on exertion  GASTROINTESTINAL: As noted in the History of Present Illness  GENITOURINARY: No problems with urination  Denies any hematuria or dysuria  NEUROLOGIC: No dizziness or vertigo, denies headaches  MUSCULOSKELETAL: Denies any muscle or joint pain  SKIN: Denies skin rashes or itching  ENDOCRINE: Denies excessive thirst  Denies intolerance to heat or cold  PSYCHOSOCIAL: Denies depression or anxiety  Denies any recent memory loss  Historical Information   Past Medical History:   Diagnosis Date    Cancer [facility])     [facility]     Chronic kidney disease     [facility] [address] [facility]     COVID-19     Decubitus ulcer of heel     LAST ASSESSED [id number]    Diabetes mellitus (HCC)     History of varicose veins     Hypertension     Intermittent claudication (HCC)     Neuropathy     Seasonal allergies      Past Surgical History:   Procedure Laterality Date    AMPUTATION ABOVE KNEE (AKA) Left 7/31/2019    Procedure: AMPUTATION ABOVE KNEE (AKA);   Surgeon: Prabhakar Jay MD;  Location: [facility];  Service: General    ANGIOPLASTY      W/ FLUOROSC ANGIOGRAPGY PERIPHERAL ARTERY ADDITIONAL  LAST ASSESSED [id number]    ANGIOPLASTY / STENTING FEMORAL      TANSCATH INTRAVASCULAR STENT PLACEMENT PERCUTANEOUS FEMORAL     COLONOSCOPY  2010    CT BONE MARROW BIOPSY AND ASPIRATION  8/9/2019    FULL THICKNESS SKIN GRAFT      TENDON REPAIR      TOE AMPUTATION Left 12/27/2018    Procedure: AMPUTATION left 4th TOE;  Surgeon: Tran Serrano DPM;  Location:  MAIN OR;  Service: Podiatry     Social History   Social History     Substance and Sexual Activity   Alcohol Use Not Currently     Social History     Substance and Sexual Activity   Drug Use Not Currently     Social History     Tobacco Use   Smoking Status Never Smoker   Smokeless Tobacco Never Used     Family History   Problem Relation Age of Onset    Other Mother         CARDIAC DISORDER     Diabetes Mother     Cancer Father     Other Family         CARDIAC DISORDER     Diabetes Family     Cancer Family     Mental illness Family     Kidney disease Sister     Diabetes Sister        Meds/Allergies       Current Outpatient Medications:     acetaminophen (TYLENOL) 500 mg tablet    albuterol (PROVENTIL HFA,VENTOLIN HFA) 90 mcg/act inhaler    Alcohol Swabs (ALCOHOL PREP) 70 % PADS    allopurinol (ZYLOPRIM) 300 mg tablet    ammonium lactate (LAC-HYDRIN) 12 % lotion    atorvastatin (LIPITOR) 40 mg tablet    BD AUTOSHIELD DUO 30G X 5 MM MISC    BD INSULIN SYRINGE U/F 31G X 5/16" 0 5 ML MISC    BD PEN NEEDLE HILARIO U/F 32G X 4 MM MISC    bisacodyl (bisacodyl) 5 mg EC tablet    bisacodyl (DULCOLAX) 10 mg suppository    bortezomib (VELCADE)    clotrimazole (LOTRIMIN) 1 % cream    colchicine (COLCRYS) 0 6 mg tablet    docusate sodium (COLACE) 100 mg capsule    Easy Touch Safety Lancets 28G MISC    fluticasone-salmeterol (ADVAIR DISKUS, WIXELA INHUB) 250-50 mcg/dose inhaler    insulin glargine (Lantus SoloStar) 100 units/mL injection pen    liraglutide (Victoza) injection    loperamide (IMODIUM) 2 mg capsule    magnesium hydroxide (MILK OF MAGNESIA) 400 mg/5 mL oral suspension    Melatonin 5 MG TABS    metFORMIN (GLUCOPHAGE) 1000 MG tablet    metoprolol succinate (TOPROL-XL) 25 mg 24 hr tablet    multivitamin-minerals therapeutic (THERA-M)    NOVOLOG FLEXPEN 100 units/mL SOPN    ondansetron (ZOFRAN) 4 mg tablet    polyethylene glycol (MIRALAX) 17 g packet    potassium chloride (K-DUR,KLOR-CON) 20 mEq tablet    torsemide (DEMADEX) 20 mg tablet    warfarin (COUMADIN) 1 mg tablet    warfarin (COUMADIN) 6 mg tablet    Allergies   Allergen Reactions    Latex Rash           Objective     There were no vitals taken for this visit  There is no height or weight on file to calculate BMI  PHYSICAL EXAM:      General Appearance:   Alert, cooperative, no distress   HEENT:   Normocephalic, atraumatic, anicteric      Neck:  Supple, symmetrical, trachea midline   Lungs:   Clear to auscultation bilaterally; no rales, rhonchi or wheezing; respirations unlabored    Heart[de-identified]   Regular rate and rhythm; no murmur, rub, or gallop  Abdomen:   Soft, non-tender, non-distended; normal bowel sounds; no masses, no organomegaly    Genitalia:   Deferred    Rectal:   Deferred    Extremities:  No cyanosis, clubbing or edema    Pulses:  2+ and symmetric    Skin:  No jaundice, rashes, or lesions    Lymph nodes:  No palpable cervical lymphadenopathy        Lab Results:   No visits with results within 1 Day(s) from this visit  Latest known visit with results is:   Hospital Outpatient Visit on 03/15/2022   Component Date Value    Color, UA 03/15/2022 Yellow     Clarity, UA 03/15/2022 Clear     Specific Gravity, UA 03/15/2022 1 020     pH, UA 03/15/2022 5 5     Leukocytes, UA 03/15/2022 Negative     Nitrite, UA 03/15/2022 Negative     Protein, UA 03/15/2022 Negative     Glucose, UA 03/15/2022 Negative     Ketones, UA 03/15/2022 Negative     Urobilinogen, UA 03/15/2022 0 2     Bilirubin, UA 03/15/2022 Negative     Blood, UA 03/15/2022 Negative          Radiology Results:   No results found

## 2022-03-21 ENCOUNTER — HOSPITAL ENCOUNTER (OUTPATIENT)
Dept: ULTRASOUND IMAGING | Facility: HOSPITAL | Age: 72
Discharge: HOME | End: 2022-03-21
Payer: COMMERCIAL

## 2022-03-21 DIAGNOSIS — R10.10 UPPER ABDOMINAL PAIN: ICD-10-CM

## 2022-03-21 PROCEDURE — 76700 US EXAM ABDOM COMPLETE: CPT

## 2022-03-22 ENCOUNTER — TELEPHONE (OUTPATIENT)
Dept: GASTROENTEROLOGY | Facility: AMBULARY SURGERY CENTER | Age: 72
End: 2022-03-22

## 2022-03-22 RX ORDER — DEXAMETHASONE 4 MG/1
20 TABLET ORAL ONCE
Status: CANCELLED
Start: 2022-04-19 | End: 2022-04-19

## 2022-03-22 RX ORDER — DEXAMETHASONE 4 MG/1
20 TABLET ORAL ONCE
Status: CANCELLED
Start: 2022-03-29 | End: 2022-03-29

## 2022-03-22 RX ORDER — DEXAMETHASONE 4 MG/1
20 TABLET ORAL ONCE
Status: CANCELLED
Start: 2022-04-12 | End: 2022-04-12

## 2022-03-22 RX ORDER — DEXAMETHASONE 4 MG/1
20 TABLET ORAL ONCE
Status: CANCELLED
Start: 2022-04-05 | End: 2022-04-05

## 2022-03-22 NOTE — TELEPHONE ENCOUNTER
Spoke with Edgar Eddy from [facility]  I explained that pt should take all meds as normal other than coumadin  I explained the medication needs to be held 5 days prior to procedure  Dustin Fontanez stated she would reach out to cardiology to get script giving them permission to hold it  I explained that any of his necessary meds he takes in the AM should be continued with a small sip of water and any medication that can be postponed, should be until after procedure

## 2022-03-22 NOTE — TELEPHONE ENCOUNTER
Patients GI provider:  Dr Rogena Spatz    Number to return call: [phone]     Reason for call: Mahsa Gordon from [facility] called requesting to speak with someone to know if there's something to be held prior to procedure       Scheduled procedure/appointment date if applicable: Apt/procedure 5/6/22

## 2022-03-25 ENCOUNTER — TELEPHONE (OUTPATIENT)
Dept: NEPHROLOGY | Facility: CLINIC | Age: 72
End: 2022-03-25

## 2022-03-25 PROCEDURE — 3066F NEPHROPATHY DOC TX: CPT | Performed by: PHYSICIAN ASSISTANT

## 2022-03-25 NOTE — TELEPHONE ENCOUNTER
Spoke to [facility] and said Noah Forbes didn't come on for the virtual  They were unaware of the appointment and said to reschedule with them not him

## 2022-03-28 ENCOUNTER — TELEPHONE (OUTPATIENT)
Dept: NEPHROLOGY | Facility: CLINIC | Age: 72
End: 2022-03-28

## 2022-03-29 ENCOUNTER — HOSPITAL ENCOUNTER (OUTPATIENT)
Dept: INFUSION CENTER | Facility: HOSPITAL | Age: 72
Discharge: HOME | End: 2022-03-29
Attending: INTERNAL MEDICINE
Payer: COMMERCIAL

## 2022-03-29 VITALS
DIASTOLIC BLOOD PRESSURE: 76 MMHG | SYSTOLIC BLOOD PRESSURE: 132 MMHG | WEIGHT: 290 LBS | TEMPERATURE: 96.6 F | HEIGHT: 75 IN | HEART RATE: 63 BPM | OXYGEN SATURATION: 99 % | BODY MASS INDEX: 36.06 KG/M2 | RESPIRATION RATE: 18 BRPM

## 2022-03-29 DIAGNOSIS — C90.00 MULTIPLE MYELOMA NOT HAVING ACHIEVED REMISSION (HCC): Primary | ICD-10-CM

## 2022-03-29 PROCEDURE — 96401 CHEMO ANTI-NEOPL SQ/IM: CPT

## 2022-03-29 RX ORDER — DEXAMETHASONE 4 MG/1
20 TABLET ORAL ONCE
Status: COMPLETED | OUTPATIENT
Start: 2022-03-29 | End: 2022-03-29

## 2022-03-29 RX ADMIN — DEXAMETHASONE 20 MG: 4 TABLET ORAL at 11:29

## 2022-03-29 RX ADMIN — BORTEZOMIB 3.4 MG: 3.5 INJECTION, POWDER, LYOPHILIZED, FOR SOLUTION INTRAVENOUS; SUBCUTANEOUS at 11:48

## 2022-03-29 NOTE — PLAN OF CARE
Problem: Potential for Falls  Goal: Patient will remain free of falls  Description: INTERVENTIONS:  - Educate patient/family on patient safety including physical limitations  - Instruct patient to call for assistance with activity   - Consult OT/PT to assist with strengthening/mobility   - Keep Call bell within reach  - Keep bed low and locked with side rails adjusted as appropriate  - Keep care items and personal belongings within reach  - Initiate and maintain comfort rounds  - Consider moving patient to room near nurses station  Outcome: Progressing     Problem: Knowledge Deficit  Goal: Patient/family/caregiver demonstrates understanding of disease process, treatment plan, medications, and discharge instructions  Description: Complete learning assessment and assess knowledge base    Interventions:  - Provide teaching at level of understanding  - Provide teaching via preferred learning methods  Outcome: Progressing

## 2022-03-30 ENCOUNTER — APPOINTMENT (OUTPATIENT)
Dept: LAB | Facility: CLINIC | Age: 72
End: 2022-03-30
Payer: COMMERCIAL

## 2022-03-30 ENCOUNTER — ANTICOAG VISIT (OUTPATIENT)
Dept: CARDIOLOGY CLINIC | Facility: CLINIC | Age: 72
End: 2022-03-30

## 2022-03-30 ENCOUNTER — TELEPHONE (OUTPATIENT)
Dept: OTHER | Facility: OTHER | Age: 72
End: 2022-03-30

## 2022-03-30 ENCOUNTER — OFFICE VISIT (OUTPATIENT)
Dept: PULMONOLOGY | Facility: CLINIC | Age: 72
End: 2022-03-30
Payer: COMMERCIAL

## 2022-03-30 VITALS
BODY MASS INDEX: 36.06 KG/M2 | WEIGHT: 290 LBS | DIASTOLIC BLOOD PRESSURE: 64 MMHG | SYSTOLIC BLOOD PRESSURE: 128 MMHG | RESPIRATION RATE: 16 BRPM | OXYGEN SATURATION: 98 % | HEART RATE: 74 BPM | HEIGHT: 75 IN | TEMPERATURE: 96.7 F

## 2022-03-30 DIAGNOSIS — I48.20 CHRONIC ATRIAL FIBRILLATION (HCC): Primary | ICD-10-CM

## 2022-03-30 DIAGNOSIS — J45.40 MODERATE PERSISTENT ASTHMA WITHOUT COMPLICATION: Primary | Chronic | ICD-10-CM

## 2022-03-30 PROCEDURE — 99213 OFFICE O/P EST LOW 20 MIN: CPT | Performed by: INTERNAL MEDICINE

## 2022-03-30 PROCEDURE — 3008F BODY MASS INDEX DOCD: CPT | Performed by: PHYSICIAN ASSISTANT

## 2022-03-30 NOTE — ASSESSMENT & PLAN NOTE
He is doing quite well from an asthma perspective  He will continue with Advair to use twice daily with albuterol as needed  He may continue to follow with his primary care physician and can come back to this office on an as-needed basis

## 2022-03-30 NOTE — PROGRESS NOTES
Spoke with Ger Bernal at [facility] center, advised INR high, states patient has not had any medication changes and has not been sick, unsure of dietary changes  Advised will hold coumadin tonight and tomorrow and restart normal dosing 4/1/22, 4 mg Tues Thurs, 6 mg all other days   Will recheck 4/8/22 (states they will take patient to [facility] lab so we have results back same day)    Physician order faxed to [facility] and Dar Abebe

## 2022-03-30 NOTE — PROGRESS NOTES
Pulmonary Follow Up Note   Loan Brand 73 y o  male MRN: [id number]  3/30/2022      Assessment/Plan: Moderate persistent asthma without complication  He is doing quite well from an asthma perspective  He will continue with Advair to use twice daily with albuterol as needed  He may continue to follow with his primary care physician and can come back to this office on an as-needed basis  History of Present Illness   HPI:  Loan Brand is a 73 y o  male who is here today for follow-up regarding asthma  He is doing well from pulmonary perspective  He uses Advair twice daily  He has not needed rescue inhaler therapy on any regular basis  Has no cough, wheeze or sputum production  No recent ER visits or hospitalizations related to asthma  Review of Systems  otherwise negative    Medical, Family and Social history reviewed and updated as appropriate    Historical Information   Past Medical History:   Diagnosis Date    Cancer [facility])     [facility] )     Chronic kidney disease     COPD (chronic obstructive pulmonary disease) ([place] [address] )     COVID-19     Decubitus ulcer of heel     LAST ASSESSED [id number]    Diabetes mellitus ([place] [address] )     History of varicose veins     Hypertension     Intermittent claudication ([place] [address] )     Neuropathy     Seasonal allergies      Past Surgical History:   Procedure Laterality Date    AMPUTATION ABOVE KNEE (AKA) Left 7/31/2019    Procedure: AMPUTATION ABOVE KNEE (AKA);   Surgeon: Chelsie Tabares MD;  Location: [facility];  Service: General    ANGIOPLASTY      W/ FLUOROSC ANGIOGRAPGY PERIPHERAL ARTERY ADDITIONAL  LAST ASSESSED [id number]    ANGIOPLASTY / STENTING FEMORAL      TANSCATH INTRAVASCULAR STENT PLACEMENT PERCUTANEOUS FEMORAL     COLONOSCOPY  2010    CT BONE MARROW BIOPSY AND ASPIRATION  8/9/2019    FULL THICKNESS SKIN GRAFT      TENDON REPAIR      TOE AMPUTATION Left 12/27/2018    Procedure: AMPUTATION left 4th TOE; Surgeon: Neyda Ulloa DPM;  Location: [facility] OR;  Service: Podiatry     Family History   Problem Relation Age of Onset    Other Mother         CARDIAC DISORDER     Diabetes Mother     Cancer Father     Other Family         CARDIAC DISORDER     Diabetes Family     Cancer Family     Mental illness Family     Kidney disease Sister     Diabetes Sister        Social History     Tobacco Use   Smoking Status Never Smoker   Smokeless Tobacco Never Used         Meds/Allergies     Current Outpatient Medications:     acetaminophen (TYLENOL) 500 mg tablet, Take 1 tablet (500 mg total) by mouth every 6 (six) hours as needed for mild pain, headaches or fever, Disp: 90 tablet, Rfl: 1    albuterol (PROVENTIL HFA,VENTOLIN HFA) 90 mcg/act inhaler, Inhale 2 puffs every 6 (six) hours as needed for wheezing, Disp: 1 Inhaler, Rfl: 0    Alcohol Swabs (ALCOHOL PREP) 70 % PADS, , Disp: , Rfl: 0    allopurinol (ZYLOPRIM) 300 mg tablet, TAKE ONE TABLET BY MOUTH DAILY (Patient taking differently: 300 mg  ), Disp: 30 tablet, Rfl: 5    ammonium lactate (LAC-HYDRIN) 12 % lotion, APPLY TO BLE TOPICALLY DAILY, Disp: 400 g, Rfl: 2    atorvastatin (LIPITOR) 40 mg tablet, Take 1 tablet (40 mg total) by mouth daily after dinner, Disp: 30 tablet, Rfl: 0    BD AUTOSHIELD DUO 30G X 5 MM MISC, USE AS DIRECTED WITH INSULIN FOUR TIMES A DAY, Disp: 100 each, Rfl: 3    BD INSULIN SYRINGE U/F 31G X 5/16" 0 5 ML MISC, USE AS DIRECTED WITH NOVOLIN 70/30, Disp: , Rfl: 0    BD PEN NEEDLE HILARIO U/F 32G X 4 MM MISC, by Other route 3 (three) times a day, Disp: 300 each, Rfl: 1    bisacodyl (bisacodyl) 5 mg EC tablet, Take 5 mg by mouth daily as needed for constipation, Disp: , Rfl:     bisacodyl (DULCOLAX) 10 mg suppository, Insert 10 mg into the rectum as needed for constipation, Disp: , Rfl:     bortezomib (VELCADE), Infuse into a venous catheter once  , Disp: , Rfl:     clotrimazole (LOTRIMIN) 1 % cream, APPLY TO BUTTOCK 2 TIMES DAILY, Disp: 45 g, Rfl: 3    colchicine (COLCRYS) 0 6 mg tablet, Take 1 tablet (0 6 mg total) by mouth daily, Disp: 2 tablet, Rfl: 0    docusate sodium (COLACE) 100 mg capsule, Take 1 capsule (100 mg total) by mouth 2 (two) times a day, Disp: , Rfl: 0    Easy Touch Safety Lancets 28G MISC, USE 4 TIMES DAILY TO TEST BLOOD SUGAR, Disp: 100 each, Rfl: 5    fluticasone-salmeterol (ADVAIR DISKUS, WIXELA INHUB) 250-50 mcg/dose inhaler, Inhale 1 puff 2 (two) times a day Rinse mouth after use , Disp: 1 Inhaler, Rfl: 5    insulin glargine (Lantus SoloStar) 100 units/mL injection pen, Inject 22 Units under the skin daily, Disp: , Rfl: 0    liraglutide (Victoza) injection, Inject 1 8 mg under the skin daily, Disp: , Rfl:     loperamide (IMODIUM) 2 mg capsule, Take 2 mg by mouth 4 (four) times a day as needed for diarrhea, Disp: , Rfl:     magnesium hydroxide (MILK OF MAGNESIA) 400 mg/5 mL oral suspension, Take 30 mL by mouth daily as needed for constipation, Disp: , Rfl:     Melatonin 5 MG TABS, TAKE ONE TABLET BY MOUTH EVERY NIGHT AT BEDTIME, Disp: 30 tablet, Rfl: 2    metFORMIN (GLUCOPHAGE) 1000 MG tablet, TAKE ONE TABLET BY MOUTH TWO TIMES A DAY (Patient taking differently: Take 1,000 mg by mouth 2 (two) times a day with meals  ), Disp: 60 tablet, Rfl: 2    metoprolol succinate (TOPROL-XL) 25 mg 24 hr tablet, Take 1 tablet (25 mg total) by mouth daily, Disp: , Rfl: 0    multivitamin-minerals therapeutic (THERA-M), , Disp: , Rfl:     NOVOLOG FLEXPEN 100 units/mL SOPN, INJ 5U BEFORE MEALS AND PER SS <250=NO CALL 250-300=5U,301-350= 10U,351-400=15U,401-450=20U>451 CALL FOR ORDERS UP TO 4X PER DAY, Disp: 15 mL, Rfl: 5    omeprazole (PriLOSEC) 40 MG capsule, Take 1 capsule (40 mg total) by mouth daily Half an hour prior to breakfast, Disp: 30 capsule, Rfl: 2    ondansetron (ZOFRAN) 4 mg tablet, ondansetron HCl 4 mg tablet  TAKE 1 TABLET BY MOUTH EVERY 8 HOURS AS NEEDED, Disp: , Rfl:     polyethylene glycol (MIRALAX) 17 g packet, Take 17 g by mouth daily, Disp: , Rfl: 0    potassium chloride (K-DUR,KLOR-CON) 20 mEq tablet, Take 2 tablets (40 mEq total) by mouth daily, Disp: , Rfl: 0    torsemide (DEMADEX) 20 mg tablet, Take 2 tablets (40 mg total) by mouth 2 (two) times a day, Disp: , Rfl: 0    warfarin (COUMADIN) 1 mg tablet, Take 1 mg by mouth daily at bedtime 2 mg on Wed and Sun, Disp: , Rfl:     warfarin (COUMADIN) 6 mg tablet, , Disp: , Rfl:     polyethylene glycol (GOLYTELY) 4000 mL solution, Take 4,000 mL by mouth once for 1 dose, Disp: 4000 mL, Rfl: 0  Allergies   Allergen Reactions    Latex Rash       Vitals: Blood pressure 128/64, pulse 74, temperature (!) 96 7 °F (35 9 °C), temperature source Tympanic, resp  rate 16, height 6' 3" (1 905 m), weight 132 kg (290 lb), SpO2 98 %  Body mass index is 36 25 kg/m²  Oxygen Therapy  SpO2: 98 %  Oxygen Therapy: None (Room air)    Physical Exam   Physical Exam  Constitutional:       Appearance: He is not ill-appearing  Eyes:      General: No scleral icterus  Cardiovascular:      Rate and Rhythm: Normal rate and regular rhythm  Pulmonary:      Breath sounds: No wheezing, rhonchi or rales  Musculoskeletal:      Comments: Left AKA   Neurological:      Mental Status: He is alert and oriented to person, place, and time  Psychiatric:         Mood and Affect: Mood normal          Labs: I have personally reviewed pertinent lab results    Lab Results   Component Value Date    WBC 15 83 (H) 03/10/2022    HGB 11 1 (L) 03/10/2022    HCT 36 9 03/10/2022    MCV 90 03/10/2022     03/10/2022     Lab Results   Component Value Date    GLUCOSE 195 (H) 08/30/2020    CALCIUM 10 0 03/10/2022     01/15/2018    K 4 5 03/10/2022    CO2 25 03/10/2022     03/10/2022    BUN 61 (H) 03/10/2022    CREATININE 2 32 (H) 03/10/2022     No results found for: IGE  Lab Results   Component Value Date    ALT 19 03/10/2022    AST 13 03/10/2022     (H) 11/03/2019    ALKPHOS 108 03/10/2022    BILITOT 0 6 01/15/2018

## 2022-03-30 NOTE — TELEPHONE ENCOUNTER
Facility is requesting that a medical clearance form be faxed to facility so it can be brought to his cardiology appointment  Per facility, patient needs clearance prior to upcoming procedure    It may be faxed to Fax # [phone]

## 2022-03-31 ENCOUNTER — TELEPHONE (OUTPATIENT)
Dept: NEPHROLOGY | Facility: CLINIC | Age: 72
End: 2022-03-31

## 2022-03-31 NOTE — TELEPHONE ENCOUNTER
LM at the [facility] to call and schedule a F/U appointment in [address] [facility] (Dr Dwight Goff)

## 2022-04-01 ENCOUNTER — TELEPHONE (OUTPATIENT)
Dept: NEPHROLOGY | Facility: CLINIC | Age: 72
End: 2022-04-01

## 2022-04-05 ENCOUNTER — HOSPITAL ENCOUNTER (OUTPATIENT)
Dept: INFUSION CENTER | Facility: HOSPITAL | Age: 72
Discharge: HOME | End: 2022-04-05
Attending: INTERNAL MEDICINE
Payer: COMMERCIAL

## 2022-04-05 VITALS
DIASTOLIC BLOOD PRESSURE: 63 MMHG | HEART RATE: 78 BPM | RESPIRATION RATE: 16 BRPM | BODY MASS INDEX: 36.44 KG/M2 | TEMPERATURE: 97.9 F | WEIGHT: 293.1 LBS | SYSTOLIC BLOOD PRESSURE: 135 MMHG | OXYGEN SATURATION: 100 % | HEIGHT: 75 IN

## 2022-04-05 DIAGNOSIS — C90.00 MULTIPLE MYELOMA NOT HAVING ACHIEVED REMISSION (HCC): Primary | ICD-10-CM

## 2022-04-05 PROCEDURE — 96401 CHEMO ANTI-NEOPL SQ/IM: CPT

## 2022-04-05 RX ORDER — DEXAMETHASONE 4 MG/1
20 TABLET ORAL ONCE
Status: COMPLETED | OUTPATIENT
Start: 2022-04-05 | End: 2022-04-05

## 2022-04-05 RX ADMIN — DEXAMETHASONE 20 MG: 4 TABLET ORAL at 11:42

## 2022-04-05 RX ADMIN — BORTEZOMIB 3.4 MG: 3.5 INJECTION, POWDER, LYOPHILIZED, FOR SOLUTION INTRAVENOUS; SUBCUTANEOUS at 11:43

## 2022-04-05 NOTE — PROGRESS NOTES
Pt arrived via w/c for Velcade injection SQ  Denies c/o  Oral Decadron given, Velcade given SQ Rt side of abdomen  Dsd applied  Disch via w/c to [facility] transport

## 2022-04-08 ENCOUNTER — ANTICOAG VISIT (OUTPATIENT)
Dept: CARDIOLOGY CLINIC | Facility: CLINIC | Age: 72
End: 2022-04-08

## 2022-04-08 ENCOUNTER — APPOINTMENT (OUTPATIENT)
Dept: LAB | Facility: CLINIC | Age: 72
End: 2022-04-08
Payer: COMMERCIAL

## 2022-04-08 DIAGNOSIS — I48.20 CHRONIC ATRIAL FIBRILLATION (HCC): Primary | ICD-10-CM

## 2022-04-12 ENCOUNTER — HOSPITAL ENCOUNTER (OUTPATIENT)
Dept: INFUSION CENTER | Facility: HOSPITAL | Age: 72
Discharge: HOME | End: 2022-04-12
Attending: INTERNAL MEDICINE
Payer: COMMERCIAL

## 2022-04-12 VITALS
OXYGEN SATURATION: 99 % | RESPIRATION RATE: 16 BRPM | BODY MASS INDEX: 36.56 KG/M2 | HEIGHT: 75 IN | WEIGHT: 294 LBS | HEART RATE: 62 BPM | SYSTOLIC BLOOD PRESSURE: 136 MMHG | DIASTOLIC BLOOD PRESSURE: 65 MMHG | TEMPERATURE: 97.9 F

## 2022-04-12 DIAGNOSIS — C90.00 MULTIPLE MYELOMA NOT HAVING ACHIEVED REMISSION (HCC): Primary | ICD-10-CM

## 2022-04-12 PROCEDURE — 96401 CHEMO ANTI-NEOPL SQ/IM: CPT

## 2022-04-12 RX ORDER — DEXAMETHASONE 4 MG/1
20 TABLET ORAL ONCE
Status: COMPLETED | OUTPATIENT
Start: 2022-04-12 | End: 2022-04-12

## 2022-04-12 RX ADMIN — BORTEZOMIB 3.4 MG: 3.5 INJECTION, POWDER, LYOPHILIZED, FOR SOLUTION INTRAVENOUS; SUBCUTANEOUS at 12:01

## 2022-04-12 RX ADMIN — DEXAMETHASONE 20 MG: 4 TABLET ORAL at 11:31

## 2022-04-18 NOTE — PROGRESS NOTES
Cardiology Follow Up    Mike Man  1950  [id number]  [facility]  [address] [place]  [address]  [phone]    1  Essential hypertension     2  Pure hypercholesterolemia     3  Chronic atrial fibrillation (HCC)     4  Cardiomyopathy, unspecified type ([address] )     5  Chronic diastolic congestive heart failure (HCC)         Interval History:  Patient is here for a follow-up visit and clearance for colonoscopy and endoscopy  He has CAF  He had a venous Doppler December 2018 which demonstrated chronic non occlusive thrombus in the left lower extremity  The patient had Aliene Pete 2019 in the setting of a nonhealing foot wound  Patient had a lipid profile done June 2020 which demonstrated total cholesterol of 107 with an HDL of 51 and a calculated LDL of 30  Patient follows with Hem/Onc in reference to multiple myeloma with ongoing treatment   Patient has NALLELY  Echocardiogram done October 2021 demonstrated a mild reduction in LVEF of 45%  Mild diffuse hypokinesis with regional variation was noted  There was mild eccentric LVH  Moderately reduced RV function was noted  There was ESTEPHANIA and no significant valvular heart disease  Mild dilatation of the ascending aorta at 4 2 cm was noted  Pharmacologic nuclear stress test done October 2021 demonstrated a fixed inferior defect related to diaphragmatic attenuation  No ischemia was noted  He is on medical management in reference to multiple comorbidities  Patient was seen while hospitalized October 2021 by our group for acute systolic and diastolic HF  Patient has had no chest pain or significant dyspnea  He anticipates having his GI procedures sometime in May  Blood pressure and heart rate are stable  Patient felt warm and temperature was noted to be 101 3      Patient Active Problem List   Diagnosis    Diabetic foot ulcer ([facility] )    Diabetes mellitus type 2, uncontrolled    Chronic venous hypertension, right    Essential hypertension    Chronic atrial fibrillation (HCC)    Morbid obesity (HCC)    Acute on chronic combined systolic and diastolic congestive heart failure (HCC)    Cardiomyopathy (HCC)    Diabetes, polyneuropathy (HCC)    GERD (gastroesophageal reflux disease)    Hyperlipidemia    Onychomycosis    Gallstones    Localized edema    Peripheral vascular disease (HCC)    SOB (shortness of breath)    NALLELY (obstructive sleep apnea)    Moderate persistent asthma without complication    DORA (acute kidney injury) (HCC)    Multiple myeloma (HCC)    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)    Hiatal hernia    Weakness    Type 2 acute myocardial infarction (HCC)    Chronic obstructive pulmonary disease, unspecified (HCC)    Hypertensive chronic kidney disease w stg 1-4/unsp chr kdny    Mass of psoas muscle    CKD (chronic kidney disease)    Chronic diastolic (congestive) heart failure (HCC)    Lymphedema    Rash    Difficulty in walking, not elsewhere classified    Gout    Venous insufficiency (chronic) (peripheral)    Sepsis due to COVID-19 pneumonia ([facility] )    Fall from bed    Need for influenza vaccination    Venous hypertension, chronic, with ulcer and inflammation, right (HCC)    Traumatic skin ulcer ([facility] )    Ambulatory dysfunction    Anemia    Supratherapeutic INR     Past Medical History:   Diagnosis Date    Cancer ([facility] )     CHF (congestive heart failure) (HCC)     Chronic kidney disease     COPD (chronic obstructive pulmonary disease) ([facility] )     COVID-19     Decubitus ulcer of heel     LAST ASSESSED [id number]    Diabetes mellitus ([facility] )     History of varicose veins     Hypertension     Intermittent claudication (HCC)     Neuropathy     Seasonal allergies      Social History     Socioeconomic History    Marital status:      Spouse name: Not on file    Number of children: 1    Years of education: Not on file    Highest education level: Not on file   Occupational History    Occupation: RETIRED   Tobacco Use    Smoking status: Never Smoker    Smokeless tobacco: Never Used   Vaping Use    Vaping Use: Never used   Substance and Sexual Activity    Alcohol use: Not Currently    Drug use: Not Currently    Sexual activity: Not Currently   Other Topics Concern    Not on file   Social History Narrative    DENIED [address]    FEELS SAFE AT HOME    LIVES WITH FAMILY - SISTER    NO LIVING WILL    POA IN EXISTENCE     SUPPORTIVE AND SAFE    One son, 2 granddaughters     Social Determinants of Health     Financial Resource Strain: Not on file   Food Insecurity: Not on file   Transportation Needs: Not on file   Physical Activity: Not on file   Stress: Not on file   Social Connections: Not on file   Intimate Partner Violence: Not on file   Housing Stability: Not on file      Family History   Problem Relation Age of Onset    Other Mother         CARDIAC DISORDER     Diabetes Mother     Cancer Father     Other Family         CARDIAC DISORDER     Diabetes Family     Cancer Family     Mental illness Family     Kidney disease Sister     Diabetes Sister      Past Surgical History:   Procedure Laterality Date    AMPUTATION ABOVE KNEE (AKA) Left 7/31/2019    Procedure: AMPUTATION ABOVE KNEE (AKA);   Surgeon: Angela Puentes MD;  Location:  MAIN OR;  Service: General    ANGIOPLASTY      W/ FLUOROSC ANGIOGRAPGY PERIPHERAL ARTERY ADDITIONAL  LAST ASSESSED [id number]    ANGIOPLASTY / STENTING FEMORAL      TANSCATH INTRAVASCULAR STENT PLACEMENT PERCUTANEOUS FEMORAL     COLONOSCOPY  2010    CT BONE MARROW BIOPSY AND ASPIRATION  8/9/2019    FULL THICKNESS SKIN GRAFT      TENDON REPAIR      TOE AMPUTATION Left 12/27/2018    Procedure: AMPUTATION left 4th TOE;  Surgeon: Lillie Vasquez DPM;  Location:  MAIN OR;  Service: Podiatry       Current Outpatient Medications:     acetaminophen (TYLENOL) 500 mg tablet, Take 1 tablet (500 mg total) by mouth every 6 (six) hours as needed for mild pain, headaches or fever, Disp: 90 tablet, Rfl: 1    albuterol (PROVENTIL HFA,VENTOLIN HFA) 90 mcg/act inhaler, Inhale 2 puffs every 6 (six) hours as needed for wheezing, Disp: 1 Inhaler, Rfl: 0    Alcohol Swabs (ALCOHOL PREP) 70 % PADS, , Disp: , Rfl: 0    allopurinol (ZYLOPRIM) 300 mg tablet, TAKE ONE TABLET BY MOUTH DAILY (Patient taking differently: 300 mg  ), Disp: 30 tablet, Rfl: 5    ammonium lactate (LAC-HYDRIN) 12 % lotion, APPLY TO BLE TOPICALLY DAILY, Disp: 400 g, Rfl: 2    atorvastatin (LIPITOR) 40 mg tablet, Take 1 tablet (40 mg total) by mouth daily after dinner, Disp: 30 tablet, Rfl: 0    BD AUTOSHIELD DUO 30G X 5 MM MISC, USE AS DIRECTED WITH INSULIN FOUR TIMES A DAY, Disp: 100 each, Rfl: 3    BD INSULIN SYRINGE U/F 31G X 5/16" 0 5 ML MISC, USE AS DIRECTED WITH NOVOLIN 70/30, Disp: , Rfl: 0    BD PEN NEEDLE HILARIO U/F 32G X 4 MM MISC, by Other route 3 (three) times a day, Disp: 300 each, Rfl: 1    bisacodyl (bisacodyl) 5 mg EC tablet, Take 5 mg by mouth daily as needed for constipation, Disp: , Rfl:     bisacodyl (DULCOLAX) 10 mg suppository, Insert 10 mg into the rectum as needed for constipation, Disp: , Rfl:     bortezomib (VELCADE), Infuse into a venous catheter once  , Disp: , Rfl:     clotrimazole (LOTRIMIN) 1 % cream, APPLY TO BUTTOCK 2 TIMES DAILY, Disp: 45 g, Rfl: 3    colchicine (COLCRYS) 0 6 mg tablet, Take 1 tablet (0 6 mg total) by mouth daily, Disp: 2 tablet, Rfl: 0    docusate sodium (COLACE) 100 mg capsule, Take 1 capsule (100 mg total) by mouth 2 (two) times a day, Disp: , Rfl: 0    Easy Touch Safety Lancets 28G MISC, USE 4 TIMES DAILY TO TEST BLOOD SUGAR, Disp: 100 each, Rfl: 5    fluticasone-salmeterol (ADVAIR DISKUS, WIXELA INHUB) 250-50 mcg/dose inhaler, Inhale 1 puff 2 (two) times a day Rinse mouth after use , Disp: 1 Inhaler, Rfl: 5    insulin glargine (Lantus SoloStar) 100 units/mL injection pen, Inject 22 Units under the skin daily, Disp: , Rfl: 0    liraglutide (Victoza) injection, Inject 1 8 mg under the skin daily, Disp: , Rfl:     loperamide (IMODIUM) 2 mg capsule, Take 2 mg by mouth 4 (four) times a day as needed for diarrhea, Disp: , Rfl:     magnesium hydroxide (MILK OF MAGNESIA) 400 mg/5 mL oral suspension, Take 30 mL by mouth daily as needed for constipation, Disp: , Rfl:     Melatonin 5 MG TABS, TAKE ONE TABLET BY MOUTH EVERY NIGHT AT BEDTIME, Disp: 30 tablet, Rfl: 2    metFORMIN (GLUCOPHAGE) 1000 MG tablet, TAKE ONE TABLET BY MOUTH TWO TIMES A DAY (Patient taking differently: Take 1,000 mg by mouth 2 (two) times a day with meals  ), Disp: 60 tablet, Rfl: 2    metoprolol succinate (TOPROL-XL) 25 mg 24 hr tablet, Take 1 tablet (25 mg total) by mouth daily, Disp: , Rfl: 0    multivitamin-minerals therapeutic (THERA-M), , Disp: , Rfl:     NOVOLOG FLEXPEN 100 units/mL SOPN, INJ 5U BEFORE MEALS AND PER SS <250=NO CALL 250-300=5U,301-350= 10U,351-400=15U,401-450=20U>451 CALL FOR ORDERS UP TO 4X PER DAY, Disp: 15 mL, Rfl: 5    omeprazole (PriLOSEC) 40 MG capsule, Take 1 capsule (40 mg total) by mouth daily Half an hour prior to breakfast (Patient taking differently: Take 20 mg by mouth daily Half an hour prior to breakfast ), Disp: 30 capsule, Rfl: 2    ondansetron (ZOFRAN) 4 mg tablet, ondansetron HCl 4 mg tablet  TAKE 1 TABLET BY MOUTH EVERY 8 HOURS AS NEEDED, Disp: , Rfl:     polyethylene glycol (MIRALAX) 17 g packet, Take 17 g by mouth daily, Disp: , Rfl: 0    potassium chloride (K-DUR,KLOR-CON) 20 mEq tablet, Take 2 tablets (40 mEq total) by mouth daily, Disp: , Rfl: 0    torsemide (DEMADEX) 20 mg tablet, Take 2 tablets (40 mg total) by mouth 2 (two) times a day, Disp: , Rfl: 0    warfarin (COUMADIN) 1 mg tablet, Take 1 mg by mouth daily at bedtime 2 mg on Wed and Sun, Disp: , Rfl:     warfarin (COUMADIN) 6 mg tablet, , Disp: , Rfl:     polyethylene glycol (GOLYTELY) 4000 mL solution, Take 4,000 mL by mouth once for 1 dose, Disp: 4000 mL, Rfl: 0  Allergies   Allergen Reactions    Latex Rash       Labs:not applicable  Imaging: US abdomen complete    Result Date: 3/23/2022  Narrative: ABDOMEN ULTRASOUND, COMPLETE INDICATION:   R10 10: Upper abdominal pain, unspecified  COMPARISON:  CT 10/4/2021 TECHNIQUE:   Real-time ultrasound of the abdomen was performed with a curvilinear transducer with both volumetric sweeps and still imaging techniques  FINDINGS: PANCREAS: Partial obscuration by bowel gas and body habitus  Visualized portions of the pancreas are within normal limits  AORTA AND IVC:  Partial obscuration  Visualized portions are normal for patient age  LIVER: Evaluation mildly limited by bowel gas  Size:  Enlarged  The liver measures 22 0 cm in the midclavicular line  Contour:  Surface contour is smooth  Parenchyma:  Mild increased echogenicity  No liver mass identified  Limited imaging of the main portal vein shows it to be patent and hepatopetal  BILIARY: The gallbladder is normal in caliber  No wall thickening or pericholecystic fluid  Shadowing gallstones identified  No sonographic Lentz's sign  No intrahepatic biliary dilatation  CBD measures 5 mm  No choledocholithiasis  KIDNEY: Right kidney measures 12 6 x 6 5 x 7 6  cm  Volume 324 3 mL Cortical mild increased echogenicity  Mild cortical thinning  Left kidney measures 13 7 x 6 5 x 5 8 cm  Volume 269 3 mL 1 6 cm partially exophytic cyst again noted Upper pole 0 2 cm minimal shadowing slight twinkle artifact echogenic focus  Cortical mild increased echogenicity SPLEEN: Measures 12 3 x 12 2 x 5 7 cm  Volume 448 6 mL Upper normal in size  ASCITES:  None  Impression: Hepatomegaly Hepatic mild steatosis Cholelithiasis Bilateral renal mild intrinsic disease suggested   Left renal small calculus suggested Workstation performed: [id number]       Review of Systems:  Review of Systems   All other systems reviewed and are negative  Physical Exam:  There were no vitals taken for this visit  Physical Exam  Vitals reviewed  Constitutional:       Appearance: He is well-developed  HENT:      Head: Normocephalic and atraumatic  Eyes:      Conjunctiva/sclera: Conjunctivae normal       Pupils: Pupils are equal, round, and reactive to light  Cardiovascular:      Rate and Rhythm: Normal rate  Heart sounds: Normal heart sounds  Pulmonary:      Effort: Pulmonary effort is normal       Breath sounds: Normal breath sounds  Musculoskeletal:      Cervical back: Normal range of motion and neck supple  Skin:     General: Skin is warm and dry  Neurological:      Mental Status: He is alert and oriented to person, place, and time  Discussion/Summary:  From cardiac perspective patient appears stable  He has no cardiac symptoms  He had recent cardiac testing which looked good  He is okay for endoscopy and colonoscopy as planned  Additionally patient is noted to have a temperature today  Will review with his facility as to whether they will assess or whether he needs to go ED

## 2022-04-19 ENCOUNTER — HOSPITAL ENCOUNTER (OUTPATIENT)
Dept: INFUSION CENTER | Facility: HOSPITAL | Age: 72
Discharge: HOME | End: 2022-04-19
Attending: INTERNAL MEDICINE
Payer: COMMERCIAL

## 2022-04-19 VITALS
WEIGHT: 292 LBS | HEIGHT: 75 IN | HEART RATE: 74 BPM | TEMPERATURE: 96.3 F | DIASTOLIC BLOOD PRESSURE: 71 MMHG | SYSTOLIC BLOOD PRESSURE: 130 MMHG | OXYGEN SATURATION: 100 % | BODY MASS INDEX: 36.31 KG/M2 | RESPIRATION RATE: 16 BRPM

## 2022-04-19 DIAGNOSIS — C90.00 MULTIPLE MYELOMA NOT HAVING ACHIEVED REMISSION (HCC): Primary | ICD-10-CM

## 2022-04-19 PROCEDURE — 96401 CHEMO ANTI-NEOPL SQ/IM: CPT

## 2022-04-19 RX ORDER — DEXAMETHASONE 4 MG/1
20 TABLET ORAL ONCE
Status: COMPLETED | OUTPATIENT
Start: 2022-04-19 | End: 2022-04-19

## 2022-04-19 RX ADMIN — BORTEZOMIB 3.4 MG: 3.5 INJECTION, POWDER, LYOPHILIZED, FOR SOLUTION INTRAVENOUS; SUBCUTANEOUS at 12:11

## 2022-04-19 RX ADMIN — DEXAMETHASONE 20 MG: 4 TABLET ORAL at 11:44

## 2022-04-19 NOTE — PROGRESS NOTES
Pt arrived via w/c from [facility] given SQ Rt side of abdomen  Instructions reviewed  Disch via w/c to transport to [facility]

## 2022-04-20 ENCOUNTER — APPOINTMENT (OUTPATIENT)
Dept: LAB | Facility: CLINIC | Age: 72
End: 2022-04-20
Payer: COMMERCIAL

## 2022-04-20 ENCOUNTER — ANTICOAG VISIT (OUTPATIENT)
Dept: CARDIOLOGY CLINIC | Facility: CLINIC | Age: 72
End: 2022-04-20

## 2022-04-20 DIAGNOSIS — I48.20 CHRONIC ATRIAL FIBRILLATION (HCC): Primary | ICD-10-CM

## 2022-04-20 NOTE — PROGRESS NOTES
Left message for [facility], advised INR still high, most likely due to chemo  Will hold tonight and tomorrow, then have patient take 4 mg daily and recheck 4/26/22  Advised to call with questions or concerns  Physician order faxed

## 2022-04-25 ENCOUNTER — TELEPHONE (OUTPATIENT)
Dept: NEPHROLOGY | Facility: HOSPITAL | Age: 72
End: 2022-04-25

## 2022-04-25 NOTE — TELEPHONE ENCOUNTER
Scott Goins with [facility] called to let us know that patient was transferred to [facility]  I called and spoke with Jonatan Gutiérrez, the unit manager and we changed patients virtual visit into a in person visit       [facility] number is [phone]

## 2022-04-26 ENCOUNTER — TELEPHONE (OUTPATIENT)
Dept: OTHER | Facility: OTHER | Age: 72
End: 2022-04-26

## 2022-04-26 RX ORDER — DEXAMETHASONE 4 MG/1
20 TABLET ORAL ONCE
Status: CANCELLED
Start: 2022-05-10 | End: 2022-05-10

## 2022-04-26 RX ORDER — DEXAMETHASONE 4 MG/1
20 TABLET ORAL ONCE
Status: CANCELLED
Start: 2022-05-03 | End: 2022-05-03

## 2022-04-26 RX ORDER — DEXAMETHASONE 4 MG/1
20 TABLET ORAL ONCE
Status: CANCELLED
Start: 2022-05-17 | End: 2022-05-17

## 2022-04-26 RX ORDER — DEXAMETHASONE 4 MG/1
20 TABLET ORAL ONCE
Status: CANCELLED
Start: 2022-05-24 | End: 2022-05-24

## 2022-04-26 NOTE — TELEPHONE ENCOUNTER
Patient moved to [address] [place] [address] over the past weekend  #[phone] [id number]     [facility] is unaware of patient's upcoming Ultrasound, Colonoscopy and Endoscopy scheduled on 05-06-22

## 2022-04-27 ENCOUNTER — OFFICE VISIT (OUTPATIENT)
Dept: CARDIOLOGY CLINIC | Facility: CLINIC | Age: 72
End: 2022-04-27
Payer: COMMERCIAL

## 2022-04-27 DIAGNOSIS — E78.00 PURE HYPERCHOLESTEROLEMIA: ICD-10-CM

## 2022-04-27 DIAGNOSIS — I42.9 CARDIOMYOPATHY, UNSPECIFIED TYPE (HCC): ICD-10-CM

## 2022-04-27 DIAGNOSIS — I50.32 CHRONIC DIASTOLIC CONGESTIVE HEART FAILURE (HCC): ICD-10-CM

## 2022-04-27 DIAGNOSIS — I10 ESSENTIAL HYPERTENSION: Primary | ICD-10-CM

## 2022-04-27 DIAGNOSIS — I48.20 CHRONIC ATRIAL FIBRILLATION (HCC): ICD-10-CM

## 2022-04-27 PROCEDURE — 1160F RVW MEDS BY RX/DR IN RCRD: CPT | Performed by: INTERNAL MEDICINE

## 2022-04-27 PROCEDURE — 99214 OFFICE O/P EST MOD 30 MIN: CPT | Performed by: INTERNAL MEDICINE

## 2022-05-03 ENCOUNTER — HOSPITAL ENCOUNTER (OUTPATIENT)
Dept: INFUSION CENTER | Facility: HOSPITAL | Age: 72
Discharge: HOME | End: 2022-05-03
Attending: INTERNAL MEDICINE
Payer: COMMERCIAL

## 2022-05-03 ENCOUNTER — TELEPHONE (OUTPATIENT)
Dept: NEPHROLOGY | Facility: CLINIC | Age: 72
End: 2022-05-03

## 2022-05-03 VITALS — BODY MASS INDEX: 36.07 KG/M2 | HEIGHT: 75 IN | WEIGHT: 290.13 LBS

## 2022-05-03 DIAGNOSIS — C90.00 MULTIPLE MYELOMA NOT HAVING ACHIEVED REMISSION (HCC): Primary | ICD-10-CM

## 2022-05-03 PROCEDURE — 96401 CHEMO ANTI-NEOPL SQ/IM: CPT

## 2022-05-03 RX ORDER — DEXAMETHASONE 4 MG/1
20 TABLET ORAL ONCE
Status: COMPLETED | OUTPATIENT
Start: 2022-05-03 | End: 2022-05-03

## 2022-05-03 RX ADMIN — BORTEZOMIB 3.4 MG: 3.5 INJECTION, POWDER, LYOPHILIZED, FOR SOLUTION INTRAVENOUS; SUBCUTANEOUS at 11:59

## 2022-05-03 RX ADMIN — DEXAMETHASONE 20 MG: 4 TABLET ORAL at 11:43

## 2022-05-03 NOTE — TELEPHONE ENCOUNTER
Reschedule Appointment   Person speaking to  [address]   Date of original appointment 5/26/2022    New appointment date 5/26/2022  2:30pm   Patient on dialysis no   Location [facility]    Provider Dr Nia Garduno    Additional Information

## 2022-05-03 NOTE — PROGRESS NOTES
Pt didn't have differential drawn for his CBC  WBC of 6 11 from 5/2/22  Reviewed above information with Dr Low Julio, per him okay to treat today without ANC

## 2022-05-03 NOTE — PROGRESS NOTES
Pt here today for chemo  Tolerated well no adverse reactions  AVS provided  Pt transported via stretcher by [facility] ambulance

## 2022-05-06 ENCOUNTER — ANESTHESIA (OUTPATIENT)
Dept: GASTROENTEROLOGY | Facility: HOSPITAL | Age: 72
End: 2022-05-06

## 2022-05-06 ENCOUNTER — HOSPITAL ENCOUNTER (OUTPATIENT)
Dept: GASTROENTEROLOGY | Facility: HOSPITAL | Age: 72
Setting detail: OUTPATIENT SURGERY
Discharge: HOME | End: 2022-05-06
Attending: INTERNAL MEDICINE | Admitting: INTERNAL MEDICINE
Payer: COMMERCIAL

## 2022-05-06 ENCOUNTER — ANESTHESIA EVENT (OUTPATIENT)
Dept: GASTROENTEROLOGY | Facility: HOSPITAL | Age: 72
End: 2022-05-06

## 2022-05-06 ENCOUNTER — ANTICOAG VISIT (OUTPATIENT)
Dept: CARDIOLOGY CLINIC | Facility: CLINIC | Age: 72
End: 2022-05-06

## 2022-05-06 ENCOUNTER — TELEPHONE (OUTPATIENT)
Dept: GASTROENTEROLOGY | Facility: CLINIC | Age: 72
End: 2022-05-06

## 2022-05-06 ENCOUNTER — TELEPHONE (OUTPATIENT)
Dept: CARDIOLOGY CLINIC | Facility: CLINIC | Age: 72
End: 2022-05-06

## 2022-05-06 VITALS
BODY MASS INDEX: 36.57 KG/M2 | DIASTOLIC BLOOD PRESSURE: 79 MMHG | OXYGEN SATURATION: 97 % | SYSTOLIC BLOOD PRESSURE: 134 MMHG | HEIGHT: 72 IN | RESPIRATION RATE: 18 BRPM | HEART RATE: 94 BPM | WEIGHT: 270 LBS | TEMPERATURE: 97.1 F

## 2022-05-06 DIAGNOSIS — Z12.11 COLON CANCER SCREENING: ICD-10-CM

## 2022-05-06 DIAGNOSIS — K59.00 CONSTIPATION, UNSPECIFIED CONSTIPATION TYPE: ICD-10-CM

## 2022-05-06 DIAGNOSIS — Z12.11 COLON CANCER SCREENING: Primary | ICD-10-CM

## 2022-05-06 DIAGNOSIS — I48.20 CHRONIC ATRIAL FIBRILLATION (HCC): Primary | ICD-10-CM

## 2022-05-06 DIAGNOSIS — K86.89 PANCREATIC CALCIFICATION: ICD-10-CM

## 2022-05-06 DIAGNOSIS — R10.10 UPPER ABDOMINAL PAIN: ICD-10-CM

## 2022-05-06 LAB
GLUCOSE SERPL-MCNC: 108 MG/DL (ref 65–140)
INR PPP: 2.91 (ref 0.84–1.19)
PROTHROMBIN TIME: 29 SECONDS (ref 11.6–14.5)

## 2022-05-06 PROCEDURE — 88305 TISSUE EXAM BY PATHOLOGIST: CPT | Performed by: PATHOLOGY

## 2022-05-06 PROCEDURE — 82948 REAGENT STRIP/BLOOD GLUCOSE: CPT

## 2022-05-06 PROCEDURE — 85610 PROTHROMBIN TIME: CPT | Performed by: ANESTHESIOLOGY

## 2022-05-06 PROCEDURE — 43259 EGD US EXAM DUODENUM/JEJUNUM: CPT | Performed by: INTERNAL MEDICINE

## 2022-05-06 PROCEDURE — 43239 EGD BIOPSY SINGLE/MULTIPLE: CPT | Performed by: INTERNAL MEDICINE

## 2022-05-06 RX ORDER — SODIUM CHLORIDE 9 MG/ML
INJECTION, SOLUTION INTRAVENOUS CONTINUOUS PRN
Status: DISCONTINUED | OUTPATIENT
Start: 2022-05-06 | End: 2022-05-06

## 2022-05-06 RX ORDER — PROPOFOL 10 MG/ML
INJECTION, EMULSION INTRAVENOUS CONTINUOUS PRN
Status: DISCONTINUED | OUTPATIENT
Start: 2022-05-06 | End: 2022-05-06

## 2022-05-06 RX ORDER — KETAMINE HCL IN NACL, ISO-OSM 100MG/10ML
SYRINGE (ML) INJECTION AS NEEDED
Status: DISCONTINUED | OUTPATIENT
Start: 2022-05-06 | End: 2022-05-06

## 2022-05-06 RX ORDER — PROPOFOL 10 MG/ML
INJECTION, EMULSION INTRAVENOUS AS NEEDED
Status: DISCONTINUED | OUTPATIENT
Start: 2022-05-06 | End: 2022-05-06

## 2022-05-06 RX ORDER — GLYCOPYRROLATE 0.2 MG/ML
INJECTION INTRAMUSCULAR; INTRAVENOUS AS NEEDED
Status: DISCONTINUED | OUTPATIENT
Start: 2022-05-06 | End: 2022-05-06

## 2022-05-06 RX ORDER — LIDOCAINE HYDROCHLORIDE 20 MG/ML
INJECTION, SOLUTION EPIDURAL; INFILTRATION; INTRACAUDAL; PERINEURAL AS NEEDED
Status: DISCONTINUED | OUTPATIENT
Start: 2022-05-06 | End: 2022-05-06

## 2022-05-06 RX ADMIN — PROPOFOL 100 MG: 10 INJECTION, EMULSION INTRAVENOUS at 08:49

## 2022-05-06 RX ADMIN — LIDOCAINE HYDROCHLORIDE 80 MG: 20 INJECTION, SOLUTION EPIDURAL; INFILTRATION; INTRACAUDAL; PERINEURAL at 08:49

## 2022-05-06 RX ADMIN — SODIUM CHLORIDE: 0.9 INJECTION, SOLUTION INTRAVENOUS at 08:05

## 2022-05-06 RX ADMIN — Medication 10 MG: at 09:08

## 2022-05-06 RX ADMIN — PROPOFOL 20 MG: 10 INJECTION, EMULSION INTRAVENOUS at 09:08

## 2022-05-06 RX ADMIN — GLYCOPYRROLATE 0.1 MG: 0.2 INJECTION, SOLUTION INTRAMUSCULAR; INTRAVENOUS at 08:47

## 2022-05-06 RX ADMIN — PROPOFOL 80 MCG/KG/MIN: 10 INJECTION, EMULSION INTRAVENOUS at 08:49

## 2022-05-06 RX ADMIN — Medication 10 MG: at 08:49

## 2022-05-06 RX ADMIN — PROPOFOL 40 MG: 10 INJECTION, EMULSION INTRAVENOUS at 09:02

## 2022-05-06 RX ADMIN — Medication 10 MG: at 08:55

## 2022-05-06 NOTE — TELEPHONE ENCOUNTER
Hi DR Eric Blum,  Patient is now resident of [address] [place] [address] in [facility]  Spoke with nurse, she advised their doctor will be handling Coumadin

## 2022-05-06 NOTE — PROGRESS NOTES
Spoke with Tamia Andrews at [facility]  She states that the doctors there will be handling patients Coumadin  She states patient is there long term  Dr Adela Moyer aware    Will resolve for now

## 2022-05-06 NOTE — TELEPHONE ENCOUNTER
Patients GI provider:  Dr Malina Quiroga    Number to return call: [phone] ext  [phone]    Reason for call: [facility] from [address] [place] [address] calling to reschedule pt  Scheduled procedure/appointment date if applicable: appt   7/5/22

## 2022-05-06 NOTE — ANESTHESIA POSTPROCEDURE EVALUATION
Post-Op Assessment Note    CV Status:  Stable  Pain Score: 0    Pain management: adequate     Mental Status:  Alert and awake   Hydration Status:  Euvolemic   PONV Controlled:  Controlled   Airway Patency:  Patent      Post Op Vitals Reviewed: Yes      Staff: Anesthesiologist, CRNA         No complications documented      /63 (05/06/22 0922)    Temp (!) 97 1 °F (36 2 °C) (05/06/22 0922)    Pulse 83 (05/06/22 0922)   Resp 18 (05/06/22 0922)    SpO2 100 % (05/06/22 0922)

## 2022-05-06 NOTE — H&P
[facility] - History & Physical  Sb Abel 73 y o  male MRN: [id number]      HPI: Sb Abel is a 73y o  year old male who presents for EGD and EUS for alternating bowel habits, LUQ pain, and pancreatic calcification  REVIEW OF SYSTEMS: Per the HPI, and otherwise unremarkable  Historical Information   Past Medical History:   Diagnosis Date    Cancer [facility])     [facility]     Chronic kidney disease     [facility]     COVID-19     Decubitus ulcer of heel     LAST ASSESSED [id number]    Diabetes mellitus (HCC)     History of varicose veins     Hypertension     Intermittent claudication (HCC)     Neuropathy     Seasonal allergies      Past Surgical History:   Procedure Laterality Date    AMPUTATION ABOVE KNEE (AKA) Left 7/31/2019    Procedure: AMPUTATION ABOVE KNEE (AKA);   Surgeon: Norma Odom MD;  Location: [facility];  Service: General    ANGIOPLASTY      W/ FLUOROSC ANGIOGRAPGY PERIPHERAL ARTERY ADDITIONAL  LAST ASSESSED [id number]    ANGIOPLASTY / STENTING FEMORAL      TANSCATH INTRAVASCULAR STENT PLACEMENT PERCUTANEOUS FEMORAL     COLONOSCOPY  2010    CT BONE MARROW BIOPSY AND ASPIRATION  8/9/2019    FULL THICKNESS SKIN GRAFT      TENDON REPAIR      TOE AMPUTATION Left 12/27/2018    Procedure: AMPUTATION left 4th TOE;  Surgeon: Laura Vinson DPM;  [facility];  Service: Podiatry     Social History   Social History     Substance and Sexual Activity   Alcohol Use Not Currently     Social History     Substance and Sexual Activity   Drug Use Not Currently     Social History     Tobacco Use   Smoking Status Never Smoker   Smokeless Tobacco Never Used     Family History   Problem Relation Age of Onset    Other Mother         CARDIAC DISORDER     Diabetes Mother     Cancer Father     Other Family         CARDIAC DISORDER     Diabetes Family     Cancer Family  Mental illness Family     Kidney disease Sister     Diabetes Sister        MEDICATIONS & ALLERGIES:    Current Outpatient Medications   Medication Instructions    acetaminophen (TYLENOL) 500 mg, Oral, Every 6 hours PRN    albuterol (PROVENTIL HFA,VENTOLIN HFA) 90 mcg/act inhaler 2 puffs, Inhalation, Every 6 hours PRN    Alcohol Swabs (ALCOHOL PREP) 70 % PADS No dose, route, or frequency recorded   allopurinol (ZYLOPRIM) 300 mg tablet TAKE ONE TABLET BY MOUTH DAILY    ammonium lactate (LAC-HYDRIN) 12 % lotion APPLY TO BLE TOPICALLY DAILY    atorvastatin (LIPITOR) 40 mg, Oral, Daily after dinner    BD AUTOSHIELD DUO 30G X 5 MM MISC USE AS DIRECTED WITH INSULIN FOUR TIMES A DAY    BD INSULIN SYRINGE U/F 31G X 5/16" 0 5 ML MISC USE AS DIRECTED WITH NOVOLIN 70/30    BD PEN NEEDLE HILARIO U/F 32G X 4 MM MISC Other, 3 times daily    bisacodyl (BISACODYL) 5 mg, Oral, Daily PRN    bisacodyl (DULCOLAX) 10 mg, Rectal, As needed    bortezomib (VELCADE) Intravenous, Once    clotrimazole (LOTRIMIN) 1 % cream APPLY TO BUTTOCK 2 TIMES DAILY    colchicine (COLCRYS) 0 6 mg, Oral, Daily    docusate sodium (COLACE) 100 mg, Oral, 2 times daily    Easy Touch Safety Lancets 28G MISC USE 4 TIMES DAILY TO TEST BLOOD SUGAR    fluticasone-salmeterol (ADVAIR DISKUS, WIXELA INHUB) 250-50 mcg/dose inhaler 1 puff, Inhalation, 2 times daily, Rinse mouth after use   Lantus SoloStar 22 Units, Subcutaneous, Daily    loperamide (IMODIUM) 2 mg, Oral, 4 times daily PRN    magnesium hydroxide (MILK OF MAGNESIA) 400 mg/5 mL oral suspension 30 mL, Oral, Daily PRN    Melatonin 5 MG TABS TAKE ONE TABLET BY MOUTH EVERY NIGHT AT BEDTIME    metFORMIN (GLUCOPHAGE) 1000 MG tablet TAKE ONE TABLET BY MOUTH TWO TIMES A DAY    metoprolol succinate (TOPROL-XL) 25 mg, Oral, Daily    multivitamin-minerals therapeutic (THERA-M) No dose, route, or frequency recorded      NOVOLOG FLEXPEN 100 units/mL SOPN INJ 5U BEFORE MEALS AND PER SS <250=NO CALL 250-300=5U,301-350= 10U,351-400=15U,401-450=20U>451 CALL FOR ORDERS UP TO 4X PER DAY    omeprazole (PRILOSEC) 40 mg, Oral, Daily, Half an hour prior to breakfast    ondansetron (ZOFRAN) 4 mg tablet ondansetron HCl 4 mg tablet   TAKE 1 TABLET BY MOUTH EVERY 8 HOURS AS NEEDED    polyethylene glycol (GOLYTELY) 4000 mL solution 4,000 mL, Oral, Once    polyethylene glycol (MIRALAX) 17 g, Oral, Daily    potassium chloride (K-DUR,KLOR-CON) 20 mEq tablet 40 mEq, Oral, Daily    torsemide (DEMADEX) 40 mg, Oral, 2 times daily    Victoza 1 8 mg, Subcutaneous, Daily    warfarin (COUMADIN) 6 mg tablet No dose, route, or frequency recorded   warfarin (COUMADIN) 1 mg, Oral, Daily at bedtime, 2 mg on Wed and Sun     Allergies   Allergen Reactions    Latex Rash       PHYSICAL EXAM:    Objective   Blood pressure 140/67, pulse 67, temperature (!) 97 2 °F (36 2 °C), temperature source Tympanic, resp  rate 18, height 6' (1 829 m), weight 122 kg (270 lb), SpO2 97 %  Body mass index is 36 62 kg/m²  Gen: NAD  CV: RRR  CHEST: Clear  ABD: Soft, NT/ND  EXT: No edema    ASSESSMENT AND PLAN:  This is a 73y o  year old male here for EGD and EUS, and he is stable and optimized for his procedure  DALTON Roberson  Chief Gastroenterology Fellow  [facility]  Available on [email or web address]

## 2022-05-06 NOTE — ANESTHESIA PREPROCEDURE EVALUATION
Procedure:  COLONOSCOPY  EGD  ENDOSCOPIC ULTRASOUND (UPPER)    Relevant Problems   CARDIO   (+) Acute on chronic combined systolic and diastolic congestive heart failure (HCC)   (+) Chronic atrial fibrillation (HCC)   (+) Essential hypertension   (+) Hyperlipidemia   (+) Type 2 acute myocardial infarction (HCC)      GI/HEPATIC   (+) GERD (gastroesophageal reflux disease)   (+) Hiatal hernia      /RENAL   (+) DORA (acute kidney injury) (HCC)   (+) CKD (chronic kidney disease)   (+) Hypertensive chronic kidney disease w stg 1-4/unsp chr kdny      HEMATOLOGY   (+) Anemia   (+) Multiple myeloma (HCC)      MUSCULOSKELETAL   (+) Gout      NEURO/PSYCH   (+) Diabetes, polyneuropathy (HCC)      PULMONARY   (+) Chronic obstructive pulmonary disease, unspecified (HCC)   (+) Moderate persistent asthma without complication   (+) NALLELY (obstructive sleep apnea)   (+) SOB (shortness of breath)      Last PO intake was at [facility] (drank prep solution)  Last solid PO intake was about 24 hours ago  Presents to preop area this AM with EMTs reporting that he only drank half of prep solution, and has not yet had a bowel movement  Took warfarin this AM  INR 2 9 this AM     Physical Exam    Airway       Dental   Comment: Poor dentition, multiple missing and damaged teeth,     Cardiovascular  Rhythm: regular,     Pulmonary  Comment: Speaking in full sentences, normal work of breathing, not tachypneic,     Other Findings  1-2+ R radial pulse  S/p L BKA      Anesthesia Plan  ASA Score- 3     Anesthesia Type- IV sedation with anesthesia with ASA Monitors  Additional Monitors:   Airway Plan:     Comment: Consented for IV sedation and GA/ETT if necessary  Plan Factors-Exercise tolerance (METS): <4 METS  Chart reviewed  Patient is not a current smoker  Obstructive sleep apnea risk education given perioperatively  Induction- intravenous      Postoperative Plan-     Informed Consent- Anesthetic plan and risks discussed with patient  I personally reviewed this patient with the CRNA  Discussed and agreed on the Anesthesia Plan with the CRNA  Sayda Omalley MD, personally examined the patient, reviewed the patient history and laboratory data, and explained the risks/benefits of the anesthetic to the patient  The patient has signed the appropriate consents and is ready to proceed

## 2022-05-10 ENCOUNTER — HOSPITAL ENCOUNTER (OUTPATIENT)
Dept: INFUSION CENTER | Facility: HOSPITAL | Age: 72
Discharge: HOME | End: 2022-05-10
Attending: INTERNAL MEDICINE
Payer: COMMERCIAL

## 2022-05-10 VITALS
BODY MASS INDEX: 36.84 KG/M2 | HEART RATE: 66 BPM | RESPIRATION RATE: 18 BRPM | OXYGEN SATURATION: 98 % | DIASTOLIC BLOOD PRESSURE: 67 MMHG | TEMPERATURE: 96.3 F | SYSTOLIC BLOOD PRESSURE: 145 MMHG | HEIGHT: 72 IN | WEIGHT: 272 LBS

## 2022-05-10 DIAGNOSIS — C90.00 MULTIPLE MYELOMA NOT HAVING ACHIEVED REMISSION (HCC): Primary | ICD-10-CM

## 2022-05-10 PROCEDURE — 96401 CHEMO ANTI-NEOPL SQ/IM: CPT

## 2022-05-10 RX ORDER — DEXAMETHASONE 4 MG/1
20 TABLET ORAL ONCE
Status: COMPLETED | OUTPATIENT
Start: 2022-05-10 | End: 2022-05-10

## 2022-05-10 RX ADMIN — DEXAMETHASONE 20 MG: 4 TABLET ORAL at 11:22

## 2022-05-10 RX ADMIN — BORTEZOMIB 3.4 MG: 3.5 INJECTION, POWDER, LYOPHILIZED, FOR SOLUTION INTRAVENOUS; SUBCUTANEOUS at 11:46

## 2022-05-12 ENCOUNTER — TELEPHONE (OUTPATIENT)
Dept: HEMATOLOGY ONCOLOGY | Facility: CLINIC | Age: 72
End: 2022-05-12

## 2022-05-13 ENCOUNTER — PREP FOR PROCEDURE (OUTPATIENT)
Dept: GASTROENTEROLOGY | Facility: CLINIC | Age: 72
End: 2022-05-13

## 2022-05-13 ENCOUNTER — TELEPHONE (OUTPATIENT)
Dept: HEMATOLOGY ONCOLOGY | Facility: CLINIC | Age: 72
End: 2022-05-13

## 2022-05-13 DIAGNOSIS — Z12.11 COLON CANCER SCREENING: Primary | ICD-10-CM

## 2022-05-13 DIAGNOSIS — K59.00 CONSTIPATION, UNSPECIFIED CONSTIPATION TYPE: ICD-10-CM

## 2022-05-13 NOTE — PROGRESS NOTES
Hematology/Oncology Outpatient Follow- up Note  [facility] 1950, [id number]  5/16/2022        Chief Complaint   Patient presents with    Follow-up       HPI:  Jose Justin a 72 yo male with multiple comorbidities including diabetes and hypertension with bone marrow biopsy proven multiple myeloma  He was referred to us in 8/2019 after 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  He was noted to have elevated kidney function and anemia which prompted work up for multiple myeloma  His skeletal survey was negative  He underwent bone marrow biopsy confirming multiple myeloma  He was initiated on treatment with with Velcade and Decadron weekly on a 35 day schedule   Velcade is 1 3 milligrams/meter squared and Decadron is 20 mg p o  On days 1, 8, 15, 22      Previous Hematologic/ Oncologic History:    Oncology History   Multiple myeloma ([facility] )   9/16/2019 Initial Diagnosis    Multiple myeloma not having achieved remission ([facility] )     9/24/2019 -  Chemotherapy    iron sucrose (VENOFER), 200 mg (100 % of original dose 200 mg), Intravenous, Once, 1 of 1 cycle  Dose modification: 200 mg (original dose 200 mg, Cycle 1, Reason: Other (Must fill in a comment))  Administration: 200 mg (9/24/2019)  bortezomib (VELCADE), 1 3 mg/m2 = 3 5 mg (81 3 % of original dose 1 6 mg/m2), Subcutaneous, Once, 24 of 27 cycles  Dose modification: 1 3 mg/m2 (original dose 1 6 mg/m2, Cycle 1, Reason: Dose Not Tolerated)  Administration: 3 5 mg (9/24/2019), 3 5 mg (10/1/2019), 3 5 mg (10/8/2019), 3 5 mg (10/15/2019), 3 5 mg (1/6/2020), 3 5 mg (1/13/2020), 3 5 mg (1/20/2020), 3 5 mg (1/27/2020), 3 5 mg (2/11/2020), 3 5 mg (2/17/2020), 3 5 mg (2/24/2020), 3 5 mg (3/2/2020), 3 5 mg (3/16/2020), 3 5 mg (3/23/2020), 3 5 mg (3/30/2020), 3 5 mg (4/6/2020), 3 5 mg (4/20/2020), 3 5 mg (4/27/2020), 3 5 mg (5/4/2020), 3 5 mg (5/11/2020), 3 5 mg (5/26/2020), 3 5 mg (6/1/2020), 3 5 mg (6/8/2020), 3 5 mg (6/15/2020), 3 5 mg (2020), 3 4 mg (2020), 3 4 mg (2020), 3 5 mg (2020), 3 4 mg (2020), 3 4 mg (2020), 3 4 mg (2020), 3 4 mg (10/5/2020), 3 4 mg (2020), 3 4 mg (2020), 3 4 mg (2020), 3 4 mg (2020), 3 4 mg (2020), 3 4 mg (2020), 3 4 mg (2020), 3 4 mg (2021), 3 4 mg (2021), 3 4 mg (2021), 3 4 mg (2/3/2021), 3 4 mg (2021), 3 4 mg (2/15/2021), 3 4 mg (2021), 3 4 mg (3/9/2021), 3 4 mg (3/16/2021), 3 4 mg (3/23/2021), 3 4 mg (3/30/2021), 3 4 mg (2021), 3 4 mg (2021), 3 4 mg (2021), 3 4 mg (2021), 3 5 mg (2021), 3 5 mg (2021), 3 5 mg (2021), 3 5 mg (2021), 3 5 mg (2021), 3 5 mg (2021), 3 5 mg (2021), 3 5 mg (2021), 3 5 mg (2021), 3 5 mg (8/3/2021), 3 5 mg (8/10/2021), 3 5 mg (2021), 3 5 mg (2021), 3 5 mg (2021), 3 5 mg (2021), 3 5 mg (2021), 3 4 mg (2021), 3 4 mg (2021), 3 4 mg (2021), 3 4 mg (2021), 3 4 mg (2021), 3 4 mg (2021), 3 4 mg (2021), 3 4 mg (2021), 3 4 mg (2022), 3 4 mg (2022), 3 4 mg (2022), 3 4 mg (2022), 3 4 mg (2022), 3 4 mg (3/1/2022), 3 4 mg (3/8/2022), 3 4 mg (3/15/2022), 3 4 mg (3/29/2022), 3 4 mg (2022), 3 4 mg (2022), 3 4 mg (2022), 3 4 mg (5/3/2022), 3 4 mg (5/10/2022)         Current Hematologic/ Oncologic Treatment:    Velcade and Decadron    ECO - Symptomatic, <50% confined to bed    Interval History:    The patient presents for routine follow up  Most recent blood work completed on 22 was reviewed  CBC: white blood cell count normal 8 28, hemoglobin stable 11 1, platelets normal 586  Creatinine elevated 2 1, decreased from 2 34 in March  IGG and IGM stable  Remainder of myeloma labs pending  Myeloma labs from 3/10/22: No monoclonal bands, light chain ratio normal  No M spike  Patient recently transferred nursing home to [facility]   He is here in a stretcher, normally comes in a wheelchair  He states they have not been getting him out of bed  He used to get into his wheelchair daily  Since his last visit, he has seen his nephrologist on 3/25  They believe elevated Cr is due to multiple comorbidities including HTN, DM, and possible cardiorenal syndrome  At the time, he was also having poor oral intake  He states he has not been drinking as much at his new facility  Patient states he has been having diarrhea daily and chronic LUQ pain which has been present for two years  He had an EGD completed 3/16 which showed evidence of a hiatal hernia and diverticula  There are no results yet for the biopsies that were taken  He was supposed to complete a colonoscopy this month but did not finish his prep  He has not rescheduled this yet  Patient is reporting some lateral left wrist swelling and pain  He has a history of gout  Denies trauma  Patient also denies dysuria, bladder pain, or difficulty urinating  Denies B symptoms  He denies neuropathy, N/V  Cancer Staging:  Cancer Staging  No matching staging information was found for the patient  Molecular Testing:         Test Results:    Imaging: No results found  Labs:   Lab Results   Component Value Date    WBC 15 83 (H) 03/10/2022    HGB 11 1 (L) 03/10/2022    HCT 36 9 03/10/2022    MCV 90 03/10/2022     03/10/2022     Lab Results   Component Value Date     01/15/2018    K 4 5 03/10/2022     03/10/2022    CO2 25 03/10/2022    ANIONGAP 11 12/22/2015    BUN 61 (H) 03/10/2022    CREATININE 2 32 (H) 03/10/2022    GLUCOSE 195 (H) 08/30/2020    GLUF 185 (H) 02/24/2022    CALCIUM 10 0 03/10/2022    CORRECTEDCA 10 1 02/24/2022    AST 13 03/10/2022    ALT 19 03/10/2022    ALKPHOS 108 03/10/2022    PROT 7 1 01/15/2018    BILITOT 0 6 01/15/2018    EGFR 27 03/10/2022         Review of Systems   Constitutional: Positive for activity change   Negative for fatigue and unexpected weight change  Respiratory: Negative  Negative for shortness of breath  Cardiovascular: Negative  Gastrointestinal: Positive for abdominal distention, abdominal pain (LUQ, chronic ) and diarrhea  Negative for blood in stool, constipation, nausea and vomiting  Genitourinary: Negative  Negative for difficulty urinating and dysuria  Musculoskeletal: Positive for gait problem (L BKA) and joint swelling (L wrist)  L wrist pain   Neurological: Negative for numbness  All other systems reviewed and are negative           Active Problems:   Patient Active Problem List   Diagnosis    Diabetic foot ulcer ([facility] )    Diabetes mellitus type 2, uncontrolled    Chronic venous hypertension, right    Essential hypertension    Chronic atrial fibrillation (HCC)    [facility]    Acute on chronic combined systolic and diastolic congestive heart failure (HCC)    Cardiomyopathy (HCC)    Diabetes, polyneuropathy ([facility] )    GERD (gastroesophageal reflux disease)    Hyperlipidemia    Onychomycosis    Gallstones    Localized edema    Peripheral vascular disease (HCC)    SOB (shortness of breath)    NALLELY (obstructive sleep apnea)    Moderate persistent asthma without complication    DORA (acute kidney injury) ([facility] )    Multiple myeloma ([place] [address] )    Above knee amputation of left lower extremity ([facility] )    Hiatal hernia    Weakness    Type 2 acute myocardial infarction ([facility] )    Chronic obstructive pulmonary disease, unspecified ([facility] )    Hypertensive chronic kidney disease w stg 1-4/unsp chr kdny    Mass of psoas muscle    CKD (chronic kidney disease)    Chronic diastolic (congestive) heart failure (HCC)    Lymphedema    Rash    Difficulty in walking, not elsewhere classified    Gout    Venous insufficiency (chronic) (peripheral)    Sepsis due to COVID-19 pneumonia ([place] [address] )    Fall from bed    Need for influenza vaccination    Venous hypertension, chronic, with ulcer and inflammation, right ([facility] )    Traumatic skin ulcer ([place] [address] )    Ambulatory dysfunction    Anemia    Supratherapeutic INR       Past Medical History:   Past Medical History:   Diagnosis Date    Cancer [facility])     [facility]congestive heart failure) [facility]     Chronic kidney disease     COPD (chronic obstructive pulmonary disease) ([facility] )     COVID-19     Decubitus ulcer of heel     LAST ASSESSED [id number]    Diabetes mellitus (HCC)     History of varicose veins     Hypertension     Intermittent claudication (HCC)     Neuropathy     Seasonal allergies        Surgical History:   Past Surgical History:   Procedure Laterality Date    AMPUTATION ABOVE KNEE (AKA) Left 7/31/2019    Procedure: AMPUTATION ABOVE KNEE (AKA); Surgeon: Salley Schaumann, MD;  Location: [facility];  Service: General    ANGIOPLASTY      W/ FLUOROSC ANGIOGRAPGY PERIPHERAL ARTERY ADDITIONAL  LAST ASSESSED [id number]    ANGIOPLASTY / STENTING FEMORAL      TANSCATH INTRAVASCULAR STENT PLACEMENT PERCUTANEOUS FEMORAL     COLONOSCOPY  2010    CT BONE MARROW BIOPSY AND ASPIRATION  8/9/2019    FULL THICKNESS SKIN GRAFT      TENDON REPAIR      TOE AMPUTATION Left 12/27/2018    Procedure: AMPUTATION left 4th TOE;  Surgeon: Pam Montanez DPM;  [facility];  Service: Podiatry       Family History:    Family History   Problem Relation Age of Onset    Other Mother         CARDIAC DISORDER     Diabetes Mother     Cancer Father     Other Family         CARDIAC DISORDER     Diabetes Family     Cancer Family     Mental illness Family     Kidney disease Sister     Diabetes Sister        Cancer-related family history includes Cancer in his family and father      Social History:   Social History     Socioeconomic History    Marital status:      Spouse name: Not on file    Number of children: 1    Years of education: Not on file    Highest education level: Not on file   Occupational History    Occupation: RETIRED   Tobacco Use    Smoking status: Never Smoker    Smokeless tobacco: Never Used   Vaping Use    Vaping Use: Never used   Substance and Sexual Activity    Alcohol use: Not Currently    Drug use: Not Currently    Sexual activity: Not Currently   Other Topics Concern    Not on file   Social History Narrative    DENIED [address]    FEELS SAFE AT HOME    LIVES WITH FAMILY - SISTER    NO LIVING WILL    POA IN EXISTENCE     SUPPORTIVE AND SAFE    One son, 2 granddaughters     Social Determinants of Health     Financial Resource Strain: Not on file   Food Insecurity: Not on file   Transportation Needs: Not on file   Physical Activity: Not on file   Stress: Not on file   Social Connections: Not on file   Intimate Partner Violence: Not on file   Housing Stability: Not on file       Current Medications:   Current Outpatient Medications   Medication Sig Dispense Refill    acetaminophen (TYLENOL) 500 mg tablet Take 1 tablet (500 mg total) by mouth every 6 (six) hours as needed for mild pain, headaches or fever (Patient not taking: Reported on 5/16/2022) 90 tablet 1    albuterol (PROVENTIL HFA,VENTOLIN HFA) 90 mcg/act inhaler Inhale 2 puffs every 6 (six) hours as needed for wheezing 1 Inhaler 0    Alcohol Swabs (ALCOHOL PREP) 70 % PADS   0    allopurinol (ZYLOPRIM) 300 mg tablet TAKE ONE TABLET BY MOUTH DAILY (Patient taking differently: 300 mg) 30 tablet 5    ammonium lactate (LAC-HYDRIN) 12 % lotion APPLY TO BLE TOPICALLY DAILY 400 g 2    atorvastatin (LIPITOR) 40 mg tablet Take 1 tablet (40 mg total) by mouth daily after dinner 30 tablet 0    BD AUTOSHIELD DUO 30G X 5 MM MISC USE AS DIRECTED WITH INSULIN FOUR TIMES A  each 3    BD INSULIN SYRINGE U/F 31G X 5/16" 0 5 ML MISC USE AS DIRECTED WITH NOVOLIN 70/30  0    BD PEN NEEDLE HILARIO U/F 32G X 4 MM MISC by Other route 3 (three) times a day 300 each 1    bisacodyl (DULCOLAX) 10 mg suppository Insert 10 mg into the rectum as needed for constipation      bisacodyl (DULCOLAX) 5 mg EC tablet Take 5 mg by mouth daily as needed for constipation      bisacodyl (DULCOLAX) 5 mg EC tablet Please follow prep instructions provided by the office  2 tablet 0    bortezomib (VELCADE) Infuse into a venous catheter once        clotrimazole (LOTRIMIN) 1 % cream APPLY TO BUTTOCK 2 TIMES DAILY 45 g 3    colchicine (COLCRYS) 0 6 mg tablet Take 1 tablet (0 6 mg total) by mouth daily 2 tablet 0    docusate sodium (COLACE) 100 mg capsule Take 1 capsule (100 mg total) by mouth 2 (two) times a day  0    Easy Touch Safety Lancets 28G MISC USE 4 TIMES DAILY TO TEST BLOOD SUGAR 100 each 5    fluticasone-salmeterol (ADVAIR DISKUS, WIXELA INHUB) 250-50 mcg/dose inhaler Inhale 1 puff 2 (two) times a day Rinse mouth after use  1 Inhaler 5    insulin glargine (Lantus SoloStar) 100 units/mL injection pen Inject 22 Units under the skin daily  0    liraglutide (Victoza) injection Inject 1 8 mg under the skin daily      loperamide (IMODIUM) 2 mg capsule Take 2 mg by mouth 4 (four) times a day as needed for diarrhea      magnesium hydroxide (MILK OF MAGNESIA) 400 mg/5 mL oral suspension Take 30 mL by mouth daily as needed for constipation      Melatonin 5 MG TABS TAKE ONE TABLET BY MOUTH EVERY NIGHT AT BEDTIME 30 tablet 2    metFORMIN (GLUCOPHAGE) 1000 MG tablet TAKE ONE TABLET BY MOUTH TWO TIMES A DAY (Patient taking differently: Take 1,000 mg by mouth in the morning and 1,000 mg in the evening   Take with meals ) 60 tablet 2    metoprolol succinate (TOPROL-XL) 25 mg 24 hr tablet Take 1 tablet (25 mg total) by mouth daily  0    multivitamin-minerals therapeutic (THERA-M)       NOVOLOG FLEXPEN 100 units/mL SOPN INJ 5U BEFORE MEALS AND PER SS <250=NO CALL 250-300=5U,301-350= 10U,351-400=15U,401-450=20U>451 CALL FOR ORDERS UP TO 4X PER DAY 15 mL 5    omeprazole (PriLOSEC) 40 MG capsule Take 1 capsule (40 mg total) by mouth daily Half an hour prior to breakfast (Patient taking differently: Take 20 mg by mouth in the morning  Half an hour prior to breakfast ) 30 capsule 2    ondansetron (ZOFRAN) 4 mg tablet ondansetron HCl 4 mg tablet   TAKE 1 TABLET BY MOUTH EVERY 8 HOURS AS NEEDED      polyethylene glycol (GOLYTELY) 4000 mL solution Take 4,000 mL by mouth once for 1 dose 4000 mL 0    polyethylene glycol (GOLYTELY) 4000 mL solution Please follow prep instructions provided by the office 4000 mL 0    polyethylene glycol (MIRALAX) 17 g packet Take 17 g by mouth daily  0    potassium chloride (K-DUR,KLOR-CON) 20 mEq tablet Take 2 tablets (40 mEq total) by mouth daily  0    torsemide (DEMADEX) 20 mg tablet Take 2 tablets (40 mg total) by mouth 2 (two) times a day  0    warfarin (COUMADIN) 1 mg tablet Take 1 mg by mouth daily at bedtime 2 mg on Wed and Sun      warfarin (COUMADIN) 6 mg tablet        No current facility-administered medications for this visit  Allergies: Allergies   Allergen Reactions    Latex Rash       Physical Exam:  /90 (BP Location: Right arm, Patient Position: Sitting, Cuff Size: Large)   Pulse 71   Temp (!) 97 3 °F (36 3 °C) (Tympanic)   Resp 14   Ht 6' (1 829 m)   Wt 123 kg (272 lb)   SpO2 (!) 89%   BMI 36 89 kg/m²   Body surface area is 2 42 meters squared  Wt Readings from Last 3 Encounters:   05/16/22 123 kg (272 lb)   05/10/22 123 kg (272 lb)   05/06/22 122 kg (270 lb)           Physical Exam  Constitutional:       General: He is not in acute distress  Appearance: He is obese  He is not diaphoretic  Comments: Patient alert and pleasant, arrived in stretcher  HENT:      Head: Normocephalic and atraumatic  Nose: Nose normal    Eyes:      General: No scleral icterus  Right eye: No discharge  Left eye: No discharge  Conjunctiva/sclera: Conjunctivae normal    Cardiovascular:      Rate and Rhythm: Normal rate and regular rhythm        Heart sounds: No murmur heard   Pulmonary:      Effort: Pulmonary effort is normal       Breath sounds: Normal breath sounds  Abdominal:      General: Bowel sounds are normal  There is distension  Tenderness: There is generalized abdominal tenderness and tenderness in the left upper quadrant  There is no guarding or rebound  Musculoskeletal:         General: Swelling present  Right wrist: Normal       Left wrist: Swelling and tenderness present  Decreased range of motion  Comments: Lateral swelling of L wrist  Warm, no erythema   Skin:     General: Skin is warm and dry  Neurological:      General: No focal deficit present  Mental Status: He is alert and oriented to person, place, and time  Psychiatric:         Mood and Affect: Mood normal          Behavior: Behavior normal          Assessment / Plan:    1  Multiple myeloma not having achieved remission [facility])    The patient is a very pleasant 74 yo male with multiple comorbidities including diabetes and hypertension with bone marrow biopsy proven multiple myeloma  He was referred to us in 8/2019 after 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  He was noted to have elevated kidney function and anemia which prompted work up for multiple myeloma  His skeletal survey was negative  He underwent bone marrow biopsy confirming multiple myeloma  He was initiated on treatment with with Velcade and Decadron weekly on a 35 day schedule  Velcade is 1 3 milligrams/meter squared and Decadron is 20 mg p o  On days 1, 8, 15, 22  Skeletal survey from 11/2020 showed no evidence of lytic bony lesions  CT CAP done during his last hospitalization on 10/4/21 showed no acute osseous pathology     He continues to tolerate his treatment well  His CBC remains in acceptable range  Myeloma labs are pending at time of visit but were stable in March  His BUN/Creatinine continues to be elevated  He has had some mild improvement since March   He saw Nephrology in March, believe etiology is related to HTN, DM, and poor oral intake  He recently transferred to a new nursing home facility and admits to not drinking much  He is encouraged to drink more water  Patient verbalized understanding  Patient has not been out of his bed into a wheelchair since transferring to a new nursing home  Included a note with his paperwork to get him in his wheelchair daily       He will continue with current treatment regimen without change    He will return for follow up in 2 months with repeat blood work  He will have labs done prior to treatments as planned  Patient was in agreement with plan of care  He was instructed to call with any questions or concerns prior to his next office visit            Goals and Barriers:  Current Goal:  Prolong Survival from multiple myeloma   Barriers: None  Patient's Capacity to Self Care:  Patient able to self care  Portions of the record may have been created with voice recognition software  Occasional wrong word or "sound a like" substitutions may have occurred due to the inherent limitations of voice recognition software  Read the chart carefully and recognize, using context, where substitutions have occurred

## 2022-05-13 NOTE — TELEPHONE ENCOUNTER
Colon rescheduled to 7/25/22 with Dr Merlin Wong at [facility]/dulcolax prep instructions and medication clearance faxed to [facility] at [phone]

## 2022-05-13 NOTE — TELEPHONE ENCOUNTER
05/13/22    Spoke with pt's nursing home reminding pt's MM work up  I faxed over pt's lab slips to [phone] as requested  I also informing the nurse that if pt can not get labs done today/ tomorrow, Please call us back and I can help to R/S him  [facility] number provided

## 2022-05-13 NOTE — TELEPHONE ENCOUNTER
Kirstin Speaker from nursing home calling again regarding getting patient's EGD rescheduled with Dr Feng Velarde  Please call her at [phone]  Thank you!

## 2022-05-16 ENCOUNTER — OFFICE VISIT (OUTPATIENT)
Dept: HEMATOLOGY ONCOLOGY | Facility: HOSPITAL | Age: 72
End: 2022-05-16
Payer: COMMERCIAL

## 2022-05-16 ENCOUNTER — TELEPHONE (OUTPATIENT)
Dept: HEMATOLOGY ONCOLOGY | Facility: CLINIC | Age: 72
End: 2022-05-16

## 2022-05-16 VITALS
HEART RATE: 71 BPM | RESPIRATION RATE: 14 BRPM | BODY MASS INDEX: 36.84 KG/M2 | SYSTOLIC BLOOD PRESSURE: 122 MMHG | HEIGHT: 72 IN | WEIGHT: 272 LBS | TEMPERATURE: 97.3 F | DIASTOLIC BLOOD PRESSURE: 90 MMHG | OXYGEN SATURATION: 89 %

## 2022-05-16 DIAGNOSIS — C90.00 MULTIPLE MYELOMA NOT HAVING ACHIEVED REMISSION (HCC): Primary | ICD-10-CM

## 2022-05-16 PROCEDURE — 1036F TOBACCO NON-USER: CPT | Performed by: NURSE PRACTITIONER

## 2022-05-16 PROCEDURE — 99214 OFFICE O/P EST MOD 30 MIN: CPT | Performed by: NURSE PRACTITIONER

## 2022-05-16 PROCEDURE — 1160F RVW MEDS BY RX/DR IN RCRD: CPT | Performed by: NURSE PRACTITIONER

## 2022-05-16 NOTE — TELEPHONE ENCOUNTER
I spoke with Phoebe Osuna at the nursing home to schedule appointment with Dr Edgar Hill and remind them of the blood work needed about a week before appointment  She understood and is arranging transportation

## 2022-05-17 ENCOUNTER — HOSPITAL ENCOUNTER (OUTPATIENT)
Dept: INFUSION CENTER | Facility: HOSPITAL | Age: 72
Discharge: HOME | End: 2022-05-17
Attending: INTERNAL MEDICINE
Payer: COMMERCIAL

## 2022-05-17 VITALS
HEIGHT: 72 IN | HEART RATE: 74 BPM | TEMPERATURE: 98.1 F | WEIGHT: 272 LBS | BODY MASS INDEX: 36.84 KG/M2 | OXYGEN SATURATION: 98 % | DIASTOLIC BLOOD PRESSURE: 74 MMHG | SYSTOLIC BLOOD PRESSURE: 139 MMHG | RESPIRATION RATE: 14 BRPM

## 2022-05-17 DIAGNOSIS — C90.00 MULTIPLE MYELOMA NOT HAVING ACHIEVED REMISSION (HCC): Primary | ICD-10-CM

## 2022-05-17 PROCEDURE — 96401 CHEMO ANTI-NEOPL SQ/IM: CPT

## 2022-05-17 RX ORDER — DEXAMETHASONE 4 MG/1
20 TABLET ORAL ONCE
Status: COMPLETED | OUTPATIENT
Start: 2022-05-17 | End: 2022-05-17

## 2022-05-17 RX ADMIN — DEXAMETHASONE 20 MG: 4 TABLET ORAL at 10:54

## 2022-05-17 RX ADMIN — BORTEZOMIB 3.4 MG: 3.5 INJECTION, POWDER, LYOPHILIZED, FOR SOLUTION INTRAVENOUS; SUBCUTANEOUS at 11:14

## 2022-05-17 NOTE — PROGRESS NOTES
Pt tolerated Velcade with no adv reactions; bandaid dry and intact; AVS given; pt left unit in stretcher with [facility]

## 2022-05-24 ENCOUNTER — HOSPITAL ENCOUNTER (OUTPATIENT)
Dept: INFUSION CENTER | Facility: HOSPITAL | Age: 72
Discharge: HOME | End: 2022-05-24
Attending: INTERNAL MEDICINE
Payer: COMMERCIAL

## 2022-05-24 VITALS
RESPIRATION RATE: 18 BRPM | SYSTOLIC BLOOD PRESSURE: 150 MMHG | HEART RATE: 61 BPM | BODY MASS INDEX: 36.82 KG/M2 | OXYGEN SATURATION: 98 % | HEIGHT: 72 IN | WEIGHT: 271.83 LBS | TEMPERATURE: 96.2 F | DIASTOLIC BLOOD PRESSURE: 64 MMHG

## 2022-05-24 DIAGNOSIS — C90.00 MULTIPLE MYELOMA NOT HAVING ACHIEVED REMISSION (HCC): Primary | ICD-10-CM

## 2022-05-24 PROCEDURE — 96401 CHEMO ANTI-NEOPL SQ/IM: CPT

## 2022-05-24 RX ORDER — DEXAMETHASONE 4 MG/1
20 TABLET ORAL ONCE
Status: COMPLETED | OUTPATIENT
Start: 2022-05-24 | End: 2022-05-24

## 2022-05-24 RX ADMIN — BORTEZOMIB 3.4 MG: 3.5 INJECTION, POWDER, LYOPHILIZED, FOR SOLUTION INTRAVENOUS; SUBCUTANEOUS at 11:17

## 2022-05-24 RX ADMIN — DEXAMETHASONE 20 MG: 4 TABLET ORAL at 11:02

## 2022-05-24 NOTE — PROGRESS NOTES
Pt tolerated Velcade with no adv reactions; bandaid dry and intact; AVS given; pt left unit with [facility] transport

## 2022-05-25 ENCOUNTER — TELEPHONE (OUTPATIENT)
Dept: NEPHROLOGY | Facility: CLINIC | Age: 72
End: 2022-05-25

## 2022-05-25 NOTE — TELEPHONE ENCOUNTER
Appointment Confirmation   Person confirmed appointment with  If not patient, name of the person Phoebe Marrow- [facility]    Date and time of appointment 5/26  2:30    Patient acknowledged and will be at appointment? yes    Did you advise the patient that they will need a urine sample if they are a new patient?  N/A    Did you advise the patient to bring their current medications for verification? (including any OTC) No    Additional Information

## 2022-05-26 ENCOUNTER — OFFICE VISIT (OUTPATIENT)
Dept: NEPHROLOGY | Facility: CLINIC | Age: 72
End: 2022-05-26
Payer: COMMERCIAL

## 2022-05-26 VITALS
DIASTOLIC BLOOD PRESSURE: 78 MMHG | WEIGHT: 277 LBS | HEIGHT: 72 IN | OXYGEN SATURATION: 99 % | SYSTOLIC BLOOD PRESSURE: 160 MMHG | BODY MASS INDEX: 37.52 KG/M2 | HEART RATE: 69 BPM

## 2022-05-26 DIAGNOSIS — N18.32 STAGE 3B CHRONIC KIDNEY DISEASE (HCC): Primary | ICD-10-CM

## 2022-05-26 DIAGNOSIS — I12.9 HYPERTENSIVE CHRONIC KIDNEY DISEASE W STG 1-4/UNSP CHR KDNY: ICD-10-CM

## 2022-05-26 DIAGNOSIS — R60.0 LOCALIZED EDEMA: Chronic | ICD-10-CM

## 2022-05-26 DIAGNOSIS — E11.42 DIABETIC POLYNEUROPATHY ASSOCIATED WITH TYPE 2 DIABETES MELLITUS (HCC): Chronic | ICD-10-CM

## 2022-05-26 DIAGNOSIS — D64.9 ANEMIA, UNSPECIFIED TYPE: ICD-10-CM

## 2022-05-26 DIAGNOSIS — I50.32 CHRONIC DIASTOLIC (CONGESTIVE) HEART FAILURE (HCC): Chronic | ICD-10-CM

## 2022-05-26 DIAGNOSIS — M1A.0320 IDIOPATHIC CHRONIC GOUT OF LEFT WRIST WITHOUT TOPHUS: ICD-10-CM

## 2022-05-26 PROCEDURE — 99214 OFFICE O/P EST MOD 30 MIN: CPT | Performed by: INTERNAL MEDICINE

## 2022-05-26 PROCEDURE — 3008F BODY MASS INDEX DOCD: CPT | Performed by: NURSE PRACTITIONER

## 2022-05-26 PROCEDURE — 3066F NEPHROPATHY DOC TX: CPT | Performed by: NURSE PRACTITIONER

## 2022-05-26 RX ORDER — NYSTATIN 100000 [USP'U]/G
100000 POWDER TOPICAL 4 TIMES DAILY
COMMUNITY

## 2022-05-26 NOTE — PROGRESS NOTES
NEPHROLOGY OUTPATIENT PROGRESS NOTE   Terry Loredo 73 y o  male MRN: [id number]  DATE: 5/26/2022  Reason for visit:   Chief Complaint   Patient presents with    Follow-up     Dx Hypertension associated with stage 2 chronic kidney disease due to type 2 diabetes mellitus  Patient Instructions   1  CKD stage 3b in setting of DM/HTN/CHF  I have concern his diabetes, myeloma and HTN are contributing factors  eGFR 38ml/min per last check May 23, 2022  -had episode of DORA with peak sCr 4 72 in oct  2021 d/t KIMO/CRS    -1 7 at discharge  -latest sCr 1 8 as of 5/23/22, suspect 1 7-2 is new baseline  -renal u/s shows mild cortical increased echogenicity with mild cortical thinning  -repeat BMP, UA and UpCr in 2 months  -March 2022 UA bland  -avoid nonsteroidals(ibuprofen, aleve, advil, motrin)  -avoid fleet enema in setting of CKD history     2  Anemia in setting of multiple myeloma - on velcade and decadron, follows with heme/onc  On venofer per hematology  3  Chronic sCHF - EF 45%, monitor daily weight, on torsemide 40mg twice daily     4  DM2, uncontrolled - last A1C 6 1 December 2019  Insulin per primary team  No repeat A1C in system  5  HTN - BP elevated in office today, on metoprolol 25mg every 12 hours  Also on torsemide as above  Ideally, goal SBP 130s for renal perfusion   -need BP log from facility     RTC in 6 months  Obtain bloodwork in 2 months and again prior to office visit  Avoid fleet enemas  Teodora Cintron was seen today for follow-up  Diagnoses and all orders for this visit:    Stage 3b chronic kidney disease ([facility] [address] )    Diabetic polyneuropathy associated with type 2 diabetes mellitus (HCC)    Chronic diastolic (congestive) heart failure (HCC)    Hypertensive chronic kidney disease w stg 1-4/unsp chr kdny  -     Basic metabolic panel; Future  -     Urinalysis with microscopic; Future  -     Protein / creatinine ratio, urine;  Future    Anemia, unspecified type    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    Localized edema        Assessment/Plan:  1  CKD stage 3b in setting of DM/HTN/CHF  I have concern his diabetes, myeloma and HTN are contributing factors  eGFR 38ml/min per last check May 23, 2022  -had episode of DORA with peak sCr 4 72 in oct  2021 d/t KIMO/CRS    -1 7 at discharge  -latest sCr 1 8 as of 5/23/22, suspect 1 7-2 is new baseline  -renal u/s shows mild cortical increased echogenicity with mild cortical thinning  -repeat BMP, UA and UpCr in 2 months  -March 2022 UA bland  -avoid nonsteroidals(ibuprofen, aleve, advil, motrin)  -avoid fleet enema in setting of CKD history     2  Anemia in setting of multiple myeloma - on velcade and decadron, follows with heme/onc  On venofer per hematology      3  Chronic sCHF - EF 45%, monitor daily weight, on torsemide 40mg twice daily     4  DM2, uncontrolled - last A1C 6 1 December 2019  Insulin per primary team  No repeat A1C in system       5  HTN - BP elevated in office today, on metoprolol 25mg every 12 hours  Also on torsemide as above  Ideally, goal SBP 130s for renal perfusion   -need BP log from facility     RTC in 6 months  Obtain bloodwork in 2 months and again prior to office visit  Avoid fleet enemas  SUBJECTIVE / INTERVAL HISTORY:  73 y o  male presents in follow up of CKD  Elizabeth Peterson denies any recent illness/hospitalizations/medication changes since last office visit  Denies NSAID use  DM2 - blood sugars improved with insulin after chemo  Denies leg edema  HTN - BP usually high  "His spleen hurts him"  Review of Systems   Constitutional: Negative for chills and fever  HENT: Negative for sore throat  Eyes: Negative for visual disturbance  Respiratory: Negative for cough and shortness of breath  Cardiovascular: Negative for chest pain and leg swelling  Gastrointestinal: Negative for abdominal pain, constipation, diarrhea, nausea and vomiting  Endocrine: Negative for polyuria  Genitourinary: Negative for decreased urine volume, difficulty urinating, dysuria and hematuria  Musculoskeletal: Negative for back pain and myalgias  Skin: Negative for rash  Neurological: Positive for weakness (with standing)  Negative for dizziness, light-headedness and numbness  Psychiatric/Behavioral: Negative for confusion  OBJECTIVE:  /78 (BP Location: Right arm, Patient Position: Sitting, Cuff Size: Large)   Pulse 69   Ht 6' (1 829 m)   Wt 126 kg (277 lb) Comment: per patient  SpO2 99%   BMI 37 57 kg/m²  Body mass index is 37 57 kg/m²  Physical exam:  Physical Exam  Vitals reviewed  Constitutional:       General: He is not in acute distress  Appearance: Normal appearance  He is well-developed  He is obese  He is not diaphoretic  HENT:      Head: Normocephalic and atraumatic  Nose: Nose normal       Mouth/Throat:      Mouth: Mucous membranes are moist       Pharynx: No oropharyngeal exudate  Eyes:      General: No scleral icterus  Right eye: No discharge  Left eye: No discharge  Neck:      Thyroid: No thyromegaly  Cardiovascular:      Rate and Rhythm: Normal rate and regular rhythm  Heart sounds: Normal heart sounds  Pulmonary:      Effort: Pulmonary effort is normal       Breath sounds: Normal breath sounds  No wheezing or rales  Abdominal:      General: Bowel sounds are normal  There is no distension  Palpations: Abdomen is soft  Tenderness: There is no abdominal tenderness  Musculoskeletal:         General: No swelling  Normal range of motion  Cervical back: Neck supple  Lymphadenopathy:      Cervical: No cervical adenopathy  Skin:     General: Skin is warm and dry  Coloration: Skin is not jaundiced  Findings: No rash  Neurological:      General: No focal deficit present  Mental Status: He is alert        Comments: awake   Psychiatric:         Mood and Affect: Mood normal          Behavior: Behavior normal          Medications:    Current Outpatient Medications:     acetaminophen (TYLENOL) 500 mg tablet, Take 1 tablet (500 mg total) by mouth every 6 (six) hours as needed for mild pain, headaches or fever (Patient not taking: Reported on 5/16/2022), Disp: 90 tablet, Rfl: 1    albuterol (PROVENTIL HFA,VENTOLIN HFA) 90 mcg/act inhaler, Inhale 2 puffs every 6 (six) hours as needed for wheezing, Disp: 1 Inhaler, Rfl: 0    Alcohol Swabs (ALCOHOL PREP) 70 % PADS, , Disp: , Rfl: 0    allopurinol (ZYLOPRIM) 300 mg tablet, TAKE ONE TABLET BY MOUTH DAILY (Patient taking differently: 300 mg), Disp: 30 tablet, Rfl: 5    ammonium lactate (LAC-HYDRIN) 12 % lotion, APPLY TO BLE TOPICALLY DAILY, Disp: 400 g, Rfl: 2    atorvastatin (LIPITOR) 40 mg tablet, Take 1 tablet (40 mg total) by mouth daily after dinner, Disp: 30 tablet, Rfl: 0    BD AUTOSHIELD DUO 30G X 5 MM MISC, USE AS DIRECTED WITH INSULIN FOUR TIMES A DAY, Disp: 100 each, Rfl: 3    BD INSULIN SYRINGE U/F 31G X 5/16" 0 5 ML MISC, USE AS DIRECTED WITH NOVOLIN 70/30, Disp: , Rfl: 0    BD PEN NEEDLE HILARIO U/F 32G X 4 MM MISC, by Other route 3 (three) times a day, Disp: 300 each, Rfl: 1    bisacodyl (DULCOLAX) 10 mg suppository, Insert 10 mg into the rectum as needed for constipation, Disp: , Rfl:     bisacodyl (DULCOLAX) 5 mg EC tablet, Take 5 mg by mouth daily as needed for constipation, Disp: , Rfl:     bisacodyl (DULCOLAX) 5 mg EC tablet, Please follow prep instructions provided by the office  , Disp: 2 tablet, Rfl: 0    bortezomib (VELCADE), Infuse into a venous catheter once  , Disp: , Rfl:     clotrimazole (LOTRIMIN) 1 % cream, APPLY TO BUTTOCK 2 TIMES DAILY, Disp: 45 g, Rfl: 3    colchicine (COLCRYS) 0 6 mg tablet, Take 1 tablet (0 6 mg total) by mouth daily, Disp: 2 tablet, Rfl: 0    docusate sodium (COLACE) 100 mg capsule, Take 1 capsule (100 mg total) by mouth 2 (two) times a day, Disp: , Rfl: 0    Easy Touch Safety Lancets 28G MISC, USE 4 TIMES DAILY TO TEST BLOOD SUGAR, Disp: 100 each, Rfl: 5    fluticasone-salmeterol (ADVAIR DISKUS, WIXELA INHUB) 250-50 mcg/dose inhaler, Inhale 1 puff 2 (two) times a day Rinse mouth after use , Disp: 1 Inhaler, Rfl: 5    insulin glargine (Lantus SoloStar) 100 units/mL injection pen, Inject 22 Units under the skin daily, Disp: , Rfl: 0    liraglutide (Victoza) injection, Inject 1 8 mg under the skin daily, Disp: , Rfl:     loperamide (IMODIUM) 2 mg capsule, Take 2 mg by mouth 4 (four) times a day as needed for diarrhea, Disp: , Rfl:     magnesium hydroxide (MILK OF MAGNESIA) 400 mg/5 mL oral suspension, Take 30 mL by mouth daily as needed for constipation, Disp: , Rfl:     Melatonin 5 MG TABS, TAKE ONE TABLET BY MOUTH EVERY NIGHT AT BEDTIME, Disp: 30 tablet, Rfl: 2    metFORMIN (GLUCOPHAGE) 1000 MG tablet, TAKE ONE TABLET BY MOUTH TWO TIMES A DAY (Patient taking differently: Take 1,000 mg by mouth in the morning and 1,000 mg in the evening  Take with meals ), Disp: 60 tablet, Rfl: 2    metoprolol succinate (TOPROL-XL) 25 mg 24 hr tablet, Take 1 tablet (25 mg total) by mouth daily, Disp: , Rfl: 0    multivitamin-minerals therapeutic (THERA-M), , Disp: , Rfl:     NOVOLOG FLEXPEN 100 units/mL SOPN, INJ 5U BEFORE MEALS AND PER SS <250=NO CALL 250-300=5U,301-350= 10U,351-400=15U,401-450=20U>451 CALL FOR ORDERS UP TO 4X PER DAY, Disp: 15 mL, Rfl: 5    omeprazole (PriLOSEC) 40 MG capsule, Take 1 capsule (40 mg total) by mouth daily Half an hour prior to breakfast (Patient taking differently: Take 20 mg by mouth in the morning   Half an hour prior to breakfast ), Disp: 30 capsule, Rfl: 2    ondansetron (ZOFRAN) 4 mg tablet, ondansetron HCl 4 mg tablet  TAKE 1 TABLET BY MOUTH EVERY 8 HOURS AS NEEDED, Disp: , Rfl:     polyethylene glycol (GOLYTELY) 4000 mL solution, Take 4,000 mL by mouth once for 1 dose, Disp: 4000 mL, Rfl: 0    polyethylene glycol (GOLYTELY) 4000 mL solution, Please follow prep instructions provided by the office, Disp: 4000 mL, Rfl: 0    polyethylene glycol (MIRALAX) 17 g packet, Take 17 g by mouth daily, Disp: , Rfl: 0    potassium chloride (K-DUR,KLOR-CON) 20 mEq tablet, Take 2 tablets (40 mEq total) by mouth daily, Disp: , Rfl: 0    torsemide (DEMADEX) 20 mg tablet, Take 2 tablets (40 mg total) by mouth 2 (two) times a day, Disp: , Rfl: 0    warfarin (COUMADIN) 1 mg tablet, Take 1 mg by mouth daily at bedtime 2 mg on Wed and Sun, Disp: , Rfl:     warfarin (COUMADIN) 6 mg tablet, , Disp: , Rfl:     Allergies: Allergies as of 05/26/2022 - Reviewed 05/26/2022   Allergen Reaction Noted    Latex Rash 09/15/2016       The following portions of the patient's history were reviewed and updated as appropriate: past family history, past surgical history and problem list     Laboratory Results:  Lab Results   Component Value Date    SODIUM 139 03/10/2022    K 4 5 03/10/2022     03/10/2022    CO2 25 03/10/2022    BUN 61 (H) 03/10/2022    CREATININE 2 32 (H) 03/10/2022    GLUC 234 (H) 03/10/2022    CALCIUM 10 0 03/10/2022        Lab Results   Component Value Date    CALCIUM 10 0 03/10/2022    PHOS 3 9 10/15/2021       Portions of the record may have been created with voice recognition software   Occasional wrong word or "sound a like" substitutions may have occurred due to the inherent limitations of voice recognition software   Read the chart carefully and recognize, using context, where substitutions have occurred

## 2022-05-26 NOTE — PATIENT INSTRUCTIONS
1  CKD stage 3b in setting of DM/HTN/CHF  I have concern his diabetes, myeloma and HTN are contributing factors  eGFR 38ml/min per last check May 23, 2022  -had episode of DORA with peak sCr 4 72 in oct  2021 d/t KIMO/CRS    -1 7 at discharge  -latest sCr 1 8 as of 5/23/22, suspect 1 7-2 is new baseline  -renal u/s shows mild cortical increased echogenicity with mild cortical thinning  -repeat BMP, UA and UpCr in 2 months  -March 2022 UA bland  -avoid nonsteroidals(ibuprofen, aleve, advil, motrin)  -avoid fleet enema in setting of CKD history     2  Anemia in setting of multiple myeloma - on velcade and decadron, follows with heme/onc  On venofer per hematology  3  Chronic sCHF - EF 45%, monitor daily weight, on torsemide 40mg twice daily     4  DM2, uncontrolled - last A1C 6 1 December 2019  Insulin per primary team  No repeat A1C in system  5  HTN - BP elevated in office today, on metoprolol 25mg every 12 hours  Also on torsemide as above  Ideally, goal SBP 130s for renal perfusion   -need BP log from facility     RTC in 6 months  Obtain bloodwork in 2 months and again prior to office visit  Avoid fleet enemas

## 2022-05-31 ENCOUNTER — TELEPHONE (OUTPATIENT)
Dept: NEPHROLOGY | Facility: CLINIC | Age: 72
End: 2022-05-31

## 2022-05-31 NOTE — TELEPHONE ENCOUNTER
Patient called because he had an appointment 05/26 and wanted to know how much he paid for his co-pay  I transferred him to billing

## 2022-06-01 DIAGNOSIS — C90.00 MULTIPLE MYELOMA NOT HAVING ACHIEVED REMISSION (HCC): Primary | ICD-10-CM

## 2022-06-01 RX ORDER — DEXAMETHASONE 4 MG/1
20 TABLET ORAL ONCE
Status: CANCELLED
Start: 2022-06-28 | End: 2022-06-28

## 2022-06-01 RX ORDER — DEXAMETHASONE 4 MG/1
20 TABLET ORAL ONCE
Status: CANCELLED
Start: 2022-06-14 | End: 2022-06-14

## 2022-06-01 RX ORDER — DEXAMETHASONE 4 MG/1
20 TABLET ORAL ONCE
Status: CANCELLED
Start: 2022-06-21 | End: 2022-06-21

## 2022-06-01 RX ORDER — DEXAMETHASONE 4 MG/1
20 TABLET ORAL ONCE
Status: CANCELLED
Start: 2022-06-07 | End: 2022-06-07

## 2022-06-07 ENCOUNTER — HOSPITAL ENCOUNTER (OUTPATIENT)
Dept: INFUSION CENTER | Facility: HOSPITAL | Age: 72
Discharge: HOME | End: 2022-06-07
Attending: INTERNAL MEDICINE
Payer: COMMERCIAL

## 2022-06-07 VITALS
DIASTOLIC BLOOD PRESSURE: 64 MMHG | HEIGHT: 72 IN | HEART RATE: 63 BPM | RESPIRATION RATE: 16 BRPM | OXYGEN SATURATION: 98 % | WEIGHT: 271.17 LBS | TEMPERATURE: 97.2 F | BODY MASS INDEX: 36.73 KG/M2 | SYSTOLIC BLOOD PRESSURE: 127 MMHG

## 2022-06-07 DIAGNOSIS — C90.00 MULTIPLE MYELOMA NOT HAVING ACHIEVED REMISSION (HCC): Primary | ICD-10-CM

## 2022-06-07 PROCEDURE — 96401 CHEMO ANTI-NEOPL SQ/IM: CPT

## 2022-06-07 RX ORDER — DEXAMETHASONE 4 MG/1
20 TABLET ORAL ONCE
Status: COMPLETED | OUTPATIENT
Start: 2022-06-07 | End: 2022-06-07

## 2022-06-07 RX ADMIN — DEXAMETHASONE 20 MG: 4 TABLET ORAL at 11:07

## 2022-06-07 RX ADMIN — BORTEZOMIB 3.4 MG: 3.5 INJECTION, POWDER, LYOPHILIZED, FOR SOLUTION INTRAVENOUS; SUBCUTANEOUS at 11:36

## 2022-06-14 ENCOUNTER — HOSPITAL ENCOUNTER (OUTPATIENT)
Dept: INFUSION CENTER | Facility: HOSPITAL | Age: 72
Discharge: HOME | End: 2022-06-14
Attending: INTERNAL MEDICINE
Payer: COMMERCIAL

## 2022-06-14 ENCOUNTER — TELEPHONE (OUTPATIENT)
Dept: HEMATOLOGY ONCOLOGY | Facility: HOSPITAL | Age: 72
End: 2022-06-14

## 2022-06-14 VITALS
OXYGEN SATURATION: 98 % | SYSTOLIC BLOOD PRESSURE: 149 MMHG | BODY MASS INDEX: 37.52 KG/M2 | WEIGHT: 277 LBS | HEIGHT: 72 IN | HEART RATE: 71 BPM | RESPIRATION RATE: 18 BRPM | DIASTOLIC BLOOD PRESSURE: 71 MMHG | TEMPERATURE: 97.2 F

## 2022-06-14 DIAGNOSIS — C90.00 MULTIPLE MYELOMA NOT HAVING ACHIEVED REMISSION (HCC): Primary | ICD-10-CM

## 2022-06-14 PROCEDURE — 96401 CHEMO ANTI-NEOPL SQ/IM: CPT

## 2022-06-14 RX ORDER — DEXAMETHASONE 4 MG/1
20 TABLET ORAL ONCE
Status: COMPLETED | OUTPATIENT
Start: 2022-06-14 | End: 2022-06-14

## 2022-06-14 RX ADMIN — BORTEZOMIB 3.4 MG: 3.5 INJECTION, POWDER, LYOPHILIZED, FOR SOLUTION INTRAVENOUS; SUBCUTANEOUS at 11:43

## 2022-06-14 RX ADMIN — DEXAMETHASONE 20 MG: 4 TABLET ORAL at 11:07

## 2022-06-14 NOTE — TELEPHONE ENCOUNTER
Message received from blake Addison asking if ok for pt to have velcade today  WBC was drawn without diff but WBC was normal  Ok to proceed  Ok to treat added in the plan

## 2022-06-14 NOTE — PROGRESS NOTES
Pt here for Velcade; ok to use CBC with no diff as per Sidney Neal RN/ Dr Zahra Peña; Velcade given with no adv reactions; bandaid dry and intact; pt picked up by Jossue Montanez

## 2022-06-21 ENCOUNTER — HOSPITAL ENCOUNTER (OUTPATIENT)
Dept: INFUSION CENTER | Facility: HOSPITAL | Age: 72
Discharge: HOME | End: 2022-06-21
Attending: INTERNAL MEDICINE
Payer: COMMERCIAL

## 2022-06-21 VITALS
HEART RATE: 70 BPM | DIASTOLIC BLOOD PRESSURE: 70 MMHG | WEIGHT: 275.57 LBS | TEMPERATURE: 98.7 F | HEIGHT: 72 IN | RESPIRATION RATE: 18 BRPM | SYSTOLIC BLOOD PRESSURE: 120 MMHG | BODY MASS INDEX: 37.33 KG/M2

## 2022-06-21 DIAGNOSIS — C90.00 MULTIPLE MYELOMA NOT HAVING ACHIEVED REMISSION (HCC): Primary | ICD-10-CM

## 2022-06-21 PROCEDURE — 96401 CHEMO ANTI-NEOPL SQ/IM: CPT

## 2022-06-21 RX ORDER — DEXAMETHASONE 4 MG/1
20 TABLET ORAL ONCE
Status: COMPLETED | OUTPATIENT
Start: 2022-06-21 | End: 2022-06-21

## 2022-06-21 RX ADMIN — DEXAMETHASONE 20 MG: 4 TABLET ORAL at 11:24

## 2022-06-21 RX ADMIN — BORTEZOMIB 3.4 MG: 3.5 INJECTION, POWDER, LYOPHILIZED, FOR SOLUTION INTRAVENOUS; SUBCUTANEOUS at 11:45

## 2022-06-28 ENCOUNTER — HOSPITAL ENCOUNTER (OUTPATIENT)
Dept: INFUSION CENTER | Facility: HOSPITAL | Age: 72
Discharge: HOME | End: 2022-06-28
Attending: INTERNAL MEDICINE
Payer: COMMERCIAL

## 2022-06-28 VITALS
HEIGHT: 72 IN | DIASTOLIC BLOOD PRESSURE: 86 MMHG | OXYGEN SATURATION: 98 % | TEMPERATURE: 97.9 F | BODY MASS INDEX: 37.3 KG/M2 | WEIGHT: 275.35 LBS | SYSTOLIC BLOOD PRESSURE: 136 MMHG | HEART RATE: 65 BPM | RESPIRATION RATE: 18 BRPM

## 2022-06-28 DIAGNOSIS — C90.00 MULTIPLE MYELOMA NOT HAVING ACHIEVED REMISSION (HCC): Primary | ICD-10-CM

## 2022-06-28 PROCEDURE — 96401 CHEMO ANTI-NEOPL SQ/IM: CPT

## 2022-06-28 RX ORDER — DEXAMETHASONE 4 MG/1
20 TABLET ORAL ONCE
Status: COMPLETED | OUTPATIENT
Start: 2022-06-28 | End: 2022-06-28

## 2022-06-28 RX ADMIN — DEXAMETHASONE 20 MG: 4 TABLET ORAL at 10:43

## 2022-06-28 RX ADMIN — BORTEZOMIB 3.4 MG: 3.5 INJECTION, POWDER, LYOPHILIZED, FOR SOLUTION INTRAVENOUS; SUBCUTANEOUS at 11:14

## 2022-06-28 NOTE — PROGRESS NOTES
Pt here today for chemo injection tolerated well no adverse reactions  AVS provided  Pt left via stretcher accompained by two attendants

## 2022-07-05 ENCOUNTER — TRANSCRIBE ORDERS (OUTPATIENT)
Dept: GASTROENTEROLOGY | Facility: CLINIC | Age: 72
End: 2022-07-05

## 2022-07-05 ENCOUNTER — OFFICE VISIT (OUTPATIENT)
Dept: GASTROENTEROLOGY | Facility: CLINIC | Age: 72
End: 2022-07-05
Payer: COMMERCIAL

## 2022-07-05 VITALS
BODY MASS INDEX: 37.25 KG/M2 | HEART RATE: 78 BPM | WEIGHT: 275 LBS | HEIGHT: 72 IN | TEMPERATURE: 98.9 F | SYSTOLIC BLOOD PRESSURE: 132 MMHG | DIASTOLIC BLOOD PRESSURE: 80 MMHG

## 2022-07-05 DIAGNOSIS — D13.5 AMPULLARY ADENOMA: Primary | ICD-10-CM

## 2022-07-05 DIAGNOSIS — C90.00 MULTIPLE MYELOMA NOT HAVING ACHIEVED REMISSION (HCC): Primary | ICD-10-CM

## 2022-07-05 DIAGNOSIS — E66.01 MORBID (SEVERE) OBESITY DUE TO EXCESS CALORIES (HCC): ICD-10-CM

## 2022-07-05 PROCEDURE — 99213 OFFICE O/P EST LOW 20 MIN: CPT | Performed by: INTERNAL MEDICINE

## 2022-07-05 RX ORDER — DEXAMETHASONE 4 MG/1
20 TABLET ORAL ONCE
Status: CANCELLED
Start: 2022-07-19 | End: 2022-07-19

## 2022-07-05 RX ORDER — DEXAMETHASONE 4 MG/1
20 TABLET ORAL ONCE
Status: CANCELLED
Start: 2022-09-06 | End: 2022-08-30

## 2022-07-05 RX ORDER — DEXAMETHASONE 4 MG/1
20 TABLET ORAL ONCE
Status: CANCELLED
Start: 2022-07-12 | End: 2022-07-12

## 2022-07-05 RX ORDER — DEXAMETHASONE 4 MG/1
20 TABLET ORAL ONCE
Status: CANCELLED
Start: 2022-08-02 | End: 2022-08-02

## 2022-07-05 RX ORDER — DEXAMETHASONE 4 MG/1
20 TABLET ORAL ONCE
Status: CANCELLED
Start: 2022-09-13 | End: 2022-09-06

## 2022-07-05 RX ORDER — DEXAMETHASONE 4 MG/1
20 TABLET ORAL ONCE
Status: CANCELLED
Start: 2022-08-30 | End: 2022-08-23

## 2022-07-05 RX ORDER — DEXAMETHASONE 4 MG/1
20 TABLET ORAL ONCE
Status: CANCELLED
Start: 2022-07-26 | End: 2022-07-26

## 2022-07-05 RX ORDER — DEXAMETHASONE 4 MG/1
20 TABLET ORAL ONCE
Status: CANCELLED
Start: 2022-08-23 | End: 2022-08-16

## 2022-07-05 NOTE — PROGRESS NOTES
[facility] - Outpatient Follow-up Note  Loan Brand 73 y o  male MRN: [id number]  Encounter: [id number]          ASSESSMENT AND PLAN:        1  Ampullary adenoma  Will plan for ERCP with ampullectomy in the next couple months  Discussed procedure in detail  Discussed risks of bleeding, infection, perforation, pancreatitis  Discussed that patient may need to remain overnight for admission after procedure  He is in agreement  Will schedule at his convenience  - ERCP; Future    2  Morbid (severe) obesity due to excess calories (HCC)  Dicussed lifestlye changes including dietary adjustments and exercise   ______________________________________________________________________    SUBJECTIVE:  Pt here for follow up visit  He had EUS for pancreatic cystic lesion  He had abnormal appearing ampulla at the time  He is here to discuss ampullectomy  Patient has no symptoms at this time  He feels well  REVIEW OF SYSTEMS IS OTHERWISE NEGATIVE  Historical Information   Past Medical History:   Diagnosis Date    Cancer [facility])     [facility]     Chronic kidney disease     COPD [facility]chronic obstructive pulmonary disease) ([facility] [address] )     COVID-19     Decubitus ulcer of heel     LAST ASSESSED [id number]    Diabetes mellitus (HCC)     History of varicose veins     Hypertension     Intermittent claudication (HCC)     Neuropathy     Seasonal allergies      Past Surgical History:   Procedure Laterality Date    AMPUTATION ABOVE KNEE (AKA) Left 7/31/2019    Procedure: AMPUTATION ABOVE KNEE (AKA);   Surgeon: Chelsie Tabares MD;  Location:  MAIN OR;  Service: General    ANGIOPLASTY      W/ FLUOROSC ANGIOGRAPGY PERIPHERAL ARTERY ADDITIONAL  LAST ASSESSED [id number]    ANGIOPLASTY / STENTING FEMORAL      TANSCATH INTRAVASCULAR STENT PLACEMENT PERCUTANEOUS FEMORAL     COLONOSCOPY  2010    CT BONE MARROW BIOPSY AND ASPIRATION  8/9/2019    FULL THICKNESS SKIN GRAFT      TENDON REPAIR      TOE AMPUTATION Left 12/27/2018    Procedure: AMPUTATION left 4th TOE;  Surgeon: Urban Goode DPM;  Location: [facility];  Service: Podiatry     Social History   Social History     Substance and Sexual Activity   Alcohol Use Not Currently     Social History     Substance and Sexual Activity   Drug Use Not Currently     Social History     Tobacco Use   Smoking Status Never Smoker   Smokeless Tobacco Never Used     Family History   Problem Relation Age of Onset    Other Mother         CARDIAC DISORDER     Diabetes Mother     Cancer Father     Other Family         CARDIAC DISORDER     Diabetes Family     Cancer Family     Mental illness Family     Kidney disease Sister     Diabetes Sister        Meds/Allergies       Current Outpatient Medications:     acetaminophen (TYLENOL) 500 mg tablet    albuterol (PROVENTIL HFA,VENTOLIN HFA) 90 mcg/act inhaler    Alcohol Swabs (ALCOHOL PREP) 70 % PADS    allopurinol (ZYLOPRIM) 300 mg tablet    ammonium lactate (LAC-HYDRIN) 12 % lotion    atorvastatin (LIPITOR) 40 mg tablet    BD AUTOSHIELD DUO 30G X 5 MM MISC    BD INSULIN SYRINGE U/F 31G X 5/16" 0 5 ML MISC    BD PEN NEEDLE HILARIO U/F 32G X 4 MM MISC    bisacodyl (DULCOLAX) 5 mg EC tablet    docusate sodium (COLACE) 100 mg capsule    Easy Touch Safety Lancets 28G MISC    fluticasone-salmeterol (ADVAIR DISKUS, WIXELA INHUB) 250-50 mcg/dose inhaler    insulin glargine (Lantus SoloStar) 100 units/mL injection pen    liraglutide (Victoza) injection    loperamide (IMODIUM) 2 mg capsule    magnesium hydroxide (MILK OF MAGNESIA) 400 mg/5 mL oral suspension    Melatonin 5 MG TABS    metFORMIN (GLUCOPHAGE) 1000 MG tablet    metoprolol succinate (TOPROL-XL) 25 mg 24 hr tablet    NOVOLOG FLEXPEN 100 units/mL SOPN    nystatin (MYCOSTATIN) powder    ondansetron (ZOFRAN) 4 mg tablet    polyethylene glycol (GOLYTELY) 4000 mL solution    polyethylene glycol (MIRALAX) 17 g packet    potassium chloride (K-DUR,KLOR-CON) 20 mEq tablet    torsemide (DEMADEX) 20 mg tablet    bisacodyl (DULCOLAX) 10 mg suppository    bisacodyl (DULCOLAX) 5 mg EC tablet    bortezomib (VELCADE)    clotrimazole (LOTRIMIN) 1 % cream    colchicine (COLCRYS) 0 6 mg tablet    multivitamin-minerals therapeutic (THERA-M)    omeprazole (PriLOSEC) 40 MG capsule    polyethylene glycol (GOLYTELY) 4000 mL solution    warfarin (COUMADIN) 1 mg tablet    warfarin (COUMADIN) 6 mg tablet    Allergies   Allergen Reactions    Latex Rash           Objective     Blood pressure 132/80, pulse 78, temperature 98 9 °F (37 2 °C), temperature source Tympanic, height 6' (1 829 m), weight 125 kg (275 lb)  Body mass index is 37 3 kg/m²  PHYSICAL EXAM:      General Appearance:   Alert, cooperative, no distress   HEENT:   Normocephalic, atraumatic, anicteric  Lungs:   Equal chest rise and unlabored breathing, normal cough   Heart:   No visualized JVD   Abdomen:   Soft, non-tender, non-distended; no masses, no organomegaly    Genitalia:   Deferred    Rectal:   Deferred    Extremities:  No cyanosis, clubbing or edema    Pulses:  Musculoskeletal:  2+ and symmetric  Normal range of motion visualized    Skin:  Neuro:  No jaundice, rashes, or lesions   Alert and appropriate           Lab Results:   No visits with results within 1 Day(s) from this visit     Latest known visit with results is:   Hospital Outpatient Visit on 05/06/2022   Component Date Value    Protime 05/06/2022 29 0 (A)    INR 05/06/2022 2 91 (A)    POC Glucose 05/06/2022 108     Case Report 05/06/2022                      Value:Surgical Pathology Report                         Case: [id number]                                   Authorizing Provider:  Meme Hermosillo DO              Collected:           05/06/2022 [id number]              Ordering Location:     [address]      Received:            05/06/2022 [id number]                                                           Pathologist:           Loki Murillo MD                                                    Specimens:   A) - Duodenum, R/O celiac                                                                           B) - Stomach, R/O h pylori                                                                          C) - Ampulla of Vater, abnormal appearance                                                 Final Diagnosis 05/06/2022                      Value: This result contains rich text formatting which cannot be displayed here   Additional Information 05/06/2022                      Value: This result contains rich text formatting which cannot be displayed here   Synoptic Checklist 05/06/2022                      Value:                            COLON/RECTUM POLYP FORM - GI - C                                                                                     :    Adenoma(s)      Gross Description 05/06/2022                      Value: This result contains rich text formatting which cannot be displayed here  Radiology Results:   No results found

## 2022-07-06 ENCOUNTER — TELEPHONE (OUTPATIENT)
Dept: GASTROENTEROLOGY | Facility: CLINIC | Age: 72
End: 2022-07-06

## 2022-07-06 NOTE — TELEPHONE ENCOUNTER
Patients GI provider:  Dr Abhishek Rowland    Number to return call: [phone] ext  [phone]    Reason for call: Luther Miranda from [facility] calling to schedule ERCP for patient      Scheduled procedure/appointment date if applicable: Procedure 1/92/76

## 2022-07-12 ENCOUNTER — HOSPITAL ENCOUNTER (OUTPATIENT)
Dept: INFUSION CENTER | Facility: HOSPITAL | Age: 72
Discharge: HOME | End: 2022-07-12
Attending: INTERNAL MEDICINE
Payer: COMMERCIAL

## 2022-07-12 VITALS
HEIGHT: 72 IN | WEIGHT: 281.8 LBS | HEART RATE: 76 BPM | SYSTOLIC BLOOD PRESSURE: 145 MMHG | TEMPERATURE: 97.7 F | DIASTOLIC BLOOD PRESSURE: 92 MMHG | RESPIRATION RATE: 18 BRPM | BODY MASS INDEX: 38.17 KG/M2 | OXYGEN SATURATION: 98 %

## 2022-07-12 DIAGNOSIS — C90.00 MULTIPLE MYELOMA NOT HAVING ACHIEVED REMISSION (HCC): Primary | ICD-10-CM

## 2022-07-12 PROCEDURE — 96401 CHEMO ANTI-NEOPL SQ/IM: CPT

## 2022-07-12 RX ORDER — DEXAMETHASONE 4 MG/1
20 TABLET ORAL ONCE
Status: COMPLETED | OUTPATIENT
Start: 2022-07-12 | End: 2022-07-12

## 2022-07-12 RX ADMIN — DEXAMETHASONE 20 MG: 4 TABLET ORAL at 11:16

## 2022-07-12 RX ADMIN — BORTEZOMIB 3.4 MG: 3.5 INJECTION, POWDER, LYOPHILIZED, FOR SOLUTION INTRAVENOUS; SUBCUTANEOUS at 11:39

## 2022-07-12 NOTE — PROGRESS NOTES
Patient received Velcade injection with no adverse reactions  Nest appointment scheduled, AVS provided  Transport notified for pickup

## 2022-07-13 NOTE — TELEPHONE ENCOUNTER
Procedure:   ERCP  Scheduled date of procedure (as of today): 8/29/22  Physician performing procedure:  Dr José Edge  Location of procedure: [facility] [place]  Instructions reviewed with patient by:  Tena Oh - mehnaz instructions faxed to [facility] at [phone]  Clearances:  Coumadin - faxed to facility

## 2022-07-14 ENCOUNTER — TELEPHONE (OUTPATIENT)
Dept: HEMATOLOGY ONCOLOGY | Facility: CLINIC | Age: 72
End: 2022-07-14

## 2022-07-14 DIAGNOSIS — D72.829 LEUKOCYTOSIS, UNSPECIFIED TYPE: ICD-10-CM

## 2022-07-14 DIAGNOSIS — C90.00 MULTIPLE MYELOMA NOT HAVING ACHIEVED REMISSION (HCC): Primary | ICD-10-CM

## 2022-07-14 NOTE — TELEPHONE ENCOUNTER
07/14/22    Spoke with Desi Hardy in pt's nursing home reminding updated labs prior to appt  Pt has recent CBC/ CMP/ PT INR but no MM study  I faxed over his lab scripts as requested @[phone]  Desi Hardy will have pt get labs done and fax the report back

## 2022-07-18 ENCOUNTER — TELEPHONE (OUTPATIENT)
Dept: HEMATOLOGY ONCOLOGY | Facility: CLINIC | Age: 72
End: 2022-07-18

## 2022-07-18 ENCOUNTER — OFFICE VISIT (OUTPATIENT)
Dept: HEMATOLOGY ONCOLOGY | Facility: HOSPITAL | Age: 72
End: 2022-07-18
Payer: COMMERCIAL

## 2022-07-18 ENCOUNTER — TELEPHONE (OUTPATIENT)
Dept: HEMATOLOGY ONCOLOGY | Facility: HOSPITAL | Age: 72
End: 2022-07-18

## 2022-07-18 VITALS
SYSTOLIC BLOOD PRESSURE: 120 MMHG | OXYGEN SATURATION: 95 % | RESPIRATION RATE: 14 BRPM | DIASTOLIC BLOOD PRESSURE: 70 MMHG | TEMPERATURE: 98 F | HEART RATE: 64 BPM

## 2022-07-18 DIAGNOSIS — C90.00 MULTIPLE MYELOMA NOT HAVING ACHIEVED REMISSION (HCC): Primary | ICD-10-CM

## 2022-07-18 PROCEDURE — 99214 OFFICE O/P EST MOD 30 MIN: CPT | Performed by: INTERNAL MEDICINE

## 2022-07-18 RX ORDER — FLUTICASONE PROPIONATE 50 MCG
SPRAY, SUSPENSION (ML) NASAL
COMMUNITY
Start: 2022-07-18

## 2022-07-18 NOTE — TELEPHONE ENCOUNTER
CALL RETURN FORM   Reason for patient call? Nadir calling in from [facility] in regards to the labs ordered by Dr Cameron Sheldon  Patient's primary oncologist? Dr Cameron Sheldon     Name of person the patient was calling for? Tao    Any additional information to add, if applicable? Gracie's best call back number is   [phone]   Informed patient that the message will be forwarded to the team and someone will get back to them as soon as possible    Did you relay this information to the patient?   Yes

## 2022-07-18 NOTE — TELEPHONE ENCOUNTER
07/18/22    Spoke with pt's nursing again for the updated labs  We received them last week but HARITHA was still in process  Asked them to fax over again today  The nursing home would like to still keep his appt even labs are pending

## 2022-07-18 NOTE — TELEPHONE ENCOUNTER
While we try to accommodate patient requests, our priority is to schedule treatment according to Doctor's orders and site availability  1  Does the Provider use the intake sheet or checkout note? SHEET  2  What would be a preferred day of the week that would work best for your infusion appointment? [facility]CT [facility]  3  Do you prefer mornings or afternoons for your appointments? [facility]TACT [facility]  4  Are there any days or dates that do not work for your schedule, including any upcoming vacations? NO  5  We are going to try our best to schedule you at the infusion center closest to your home  In the event that we are unable to what would be your next preferred infusion site or sites? [facility]      6  Do you have transportation to take you to all of your appointments?  YES  Would you like the infusion center to draw labs from your port? (disregard if patient doesn't have a port or need labs for infusion appointment)N/A

## 2022-07-18 NOTE — PROGRESS NOTES
Hematology/Oncology Outpatient Follow- up Note  Umesh Blind 73 y o  male MRN: @ Encounter: [id number]        Date:  7/18/2022    Presenting Complaint/Diagnosis : Biclonal gammopathy with IgG Kappa  Bone marrow biopsy revealed multiple myeloma  HPI:    The patient was admitted to the hospital with wound infection  Kimberly Cesar has a history of DM, HTN, and several other comorbid conditions   He seems to have had a slightly elevated creatinine for about the past year, seems to be slightly increasing more recently  He has also been anemic for quiet some time  Skeletal survey was negative but we did a bone marrow biopsy which showed plasma cell neoplasm consistent with myeloma so he was referred to see us      Previous Hematologic/ Oncologic History:    Oncology History   Multiple myeloma ([facility] )   9/16/2019 Initial Diagnosis    Multiple myeloma not having achieved remission ([facility] )     9/24/2019 -  Chemotherapy    iron sucrose (VENOFER), 200 mg (100 % of original dose 200 mg), Intravenous, Once, 1 of 1 cycle  Dose modification: 200 mg (original dose 200 mg, Cycle 1, Reason: Other (Must fill in a comment))  Administration: 200 mg (9/24/2019)  bortezomib (VELCADE), 1 3 mg/m2 = 3 5 mg (81 3 % of original dose 1 6 mg/m2), Subcutaneous, Once, 26 of 31 cycles  Dose modification: 1 3 mg/m2 (original dose 1 6 mg/m2, Cycle 1, Reason: Dose Not Tolerated)  Administration: 3 5 mg (9/24/2019), 3 5 mg (10/1/2019), 3 5 mg (10/8/2019), 3 5 mg (10/15/2019), 3 5 mg (1/6/2020), 3 5 mg (1/13/2020), 3 5 mg (1/20/2020), 3 5 mg (1/27/2020), 3 5 mg (2/11/2020), 3 5 mg (2/17/2020), 3 5 mg (2/24/2020), 3 5 mg (3/2/2020), 3 5 mg (3/16/2020), 3 5 mg (3/23/2020), 3 5 mg (3/30/2020), 3 5 mg (4/6/2020), 3 5 mg (4/20/2020), 3 5 mg (4/27/2020), 3 5 mg (5/4/2020), 3 5 mg (5/11/2020), 3 5 mg (5/26/2020), 3 5 mg (6/1/2020), 3 5 mg (6/8/2020), 3 5 mg (6/15/2020), 3 5 mg (7/20/2020), 3 4 mg (7/27/2020), 3 4 mg (8/24/2020), 3 5 mg (8/31/2020), 3 4 mg (9/8/2020), 3 4 mg (9/14/2020), 3 4 mg (9/28/2020), 3 4 mg (10/5/2020), 3 4 mg (11/2/2020), 3 4 mg (11/9/2020), 3 4 mg (11/23/2020), 3 4 mg (11/30/2020), 3 4 mg (12/7/2020), 3 4 mg (12/14/2020), 3 4 mg (12/28/2020), 3 4 mg (1/4/2021), 3 4 mg (1/11/2021), 3 4 mg (1/18/2021), 3 4 mg (2/3/2021), 3 4 mg (2/9/2021), 3 4 mg (2/15/2021), 3 4 mg (2/22/2021), 3 4 mg (3/9/2021), 3 4 mg (3/16/2021), 3 4 mg (3/23/2021), 3 4 mg (3/30/2021), 3 4 mg (4/13/2021), 3 4 mg (4/20/2021), 3 4 mg (4/27/2021), 3 4 mg (5/4/2021), 3 5 mg (5/18/2021), 3 5 mg (5/25/2021), 3 5 mg (6/1/2021), 3 5 mg (6/8/2021), 3 5 mg (6/22/2021), 3 5 mg (6/29/2021), 3 5 mg (7/6/2021), 3 5 mg (7/13/2021), 3 5 mg (7/27/2021), 3 5 mg (8/3/2021), 3 5 mg (8/10/2021), 3 5 mg (8/17/2021), 3 5 mg (8/31/2021), 3 5 mg (9/7/2021), 3 5 mg (9/14/2021), 3 5 mg (9/21/2021), 3 4 mg (11/2/2021), 3 4 mg (11/9/2021), 3 4 mg (11/16/2021), 3 4 mg (11/23/2021), 3 4 mg (12/7/2021), 3 4 mg (12/14/2021), 3 4 mg (12/21/2021), 3 4 mg (12/28/2021), 3 4 mg (1/11/2022), 3 4 mg (1/18/2022), 3 4 mg (1/25/2022), 3 4 mg (2/1/2022), 3 4 mg (2/17/2022), 3 4 mg (3/1/2022), 3 4 mg (3/8/2022), 3 4 mg (3/15/2022), 3 4 mg (3/29/2022), 3 4 mg (4/5/2022), 3 4 mg (4/12/2022), 3 4 mg (4/19/2022), 3 4 mg (5/3/2022), 3 4 mg (5/10/2022), 3 4 mg (5/17/2022), 3 4 mg (5/24/2022), 3 4 mg (6/7/2022), 3 4 mg (6/14/2022), 3 4 mg (6/21/2022), 3 4 mg (6/28/2022), 3 4 mg (7/12/2022)         Current Hematologic/ Oncologic Treatment:    Velcade and Decadron    Interval History:    The patient returns for follow-up visit  He was last seen in May  His blood work does not have free light chains  We have been consistently ordering this in the nursing home has not been drawing it  Immunoglobulin levels are within acceptable levels with all 3 being low  SPEP with immunofixation is not available as again the blood work was not done prior to his visit by the nursing facility  Creatinine is 1 8 with a hemoglobin of 9 1  We will call the nursing home and send a fresh set of blood work that he needs to have drawn and we have emphasized to them we need to know these labs if we are to continue treat Mr Rayray Aponte as they continue to not draw the blood work that is required  This has been a constant issue over the last year  We have faxed blood work over but it is not drawn  I explained to Mr Rayray Aponte that it is very hard for us to have an idea about what is myeloma is doing without blood work  He was seen by our colleagues in Gastroenterology and had an abnormal appearing ampulla for which he is getting an ampullectomy from what he describes and what is in the chart  Otherwise he is at baseline  Occasionally has diarrhea  He states it is not consistent but some days he goes 3 times a day and other days he has no diarrhea or bowel movement  He states the day he gets his Velcade is a day were sometimes he does have some diarrhea i e  He goes 3 times day  Denies any nausea or vomiting  Denies any frequent infections  The rest of his 14 point review of systems today was negative  Test Results:    Imaging: No results found  Labs:   Lab Results   Component Value Date    WBC 15 83 (H) 03/10/2022    HGB 11 1 (L) 03/10/2022    HCT 36 9 03/10/2022    MCV 90 03/10/2022     03/10/2022     Lab Results   Component Value Date     01/15/2018    K 4 5 03/10/2022     03/10/2022    CO2 25 03/10/2022    ANIONGAP 11 12/22/2015    BUN 61 (H) 03/10/2022    CREATININE 2 32 (H) 03/10/2022    GLUCOSE 195 (H) 08/30/2020    GLUF 185 (H) 02/24/2022    CALCIUM 10 0 03/10/2022    CORRECTEDCA 10 1 02/24/2022    AST 13 03/10/2022    ALT 19 03/10/2022    ALKPHOS 108 03/10/2022    PROT 7 1 01/15/2018    BILITOT 0 6 01/15/2018    EGFR 27 03/10/2022         Lab Results   Component Value Date    SPEP See Comment 03/10/2022    UPEP  08/04/2019     The UPEP shows non-selective proteinuria  The UPEP shows a monoclonal gammopathy  Immunofixation to be performed  Reviewed by: Lobo Roth MD **Electronic Signature**       Lab Results   Component Value Date    IRON 26 (L) 10/04/2021    TIBC 250 10/04/2021    FERRITIN 131 10/04/2021       ROS: As stated in the history of present illness otherwise his 14 point review of systems today was negative        Active Problems:   Patient Active Problem List   Diagnosis    Diabetic foot ulcer ([facility] )    Diabetes mellitus type 2, uncontrolled    Chronic venous hypertension, right    Essential hypertension    Chronic atrial fibrillation (HCC)    Morbid obesity ([facility] )    Acute on chronic combined systolic and diastolic congestive heart failure (HCC)    Cardiomyopathy (HCC)    Diabetes, polyneuropathy ([facility] )    GERD (gastroesophageal reflux disease)    Hyperlipidemia    Onychomycosis    Gallstones    Localized edema    Peripheral vascular disease (HCC)    SOB (shortness of breath)    NALLELY (obstructive sleep apnea)    Moderate persistent asthma without complication    DORA (acute kidney injury) ([facility] )    Multiple myeloma ([facility] )    Above knee amputation of left lower extremity ([facility] )    Hiatal hernia    Weakness    Type 2 acute myocardial infarction ([facility] )    Chronic obstructive pulmonary disease, unspecified ([facility] )    Hypertensive chronic kidney disease w stg 1-4/unsp chr kdny    Mass of psoas muscle    CKD (chronic kidney disease)    Chronic diastolic (congestive) heart failure (HCC)    Lymphedema    Rash    Difficulty in walking, not elsewhere classified    Gout    Venous insufficiency (chronic) (peripheral)    Sepsis due to COVID-19 pneumonia ([facility] )    Fall from bed    Need for influenza vaccination    Venous hypertension, chronic, with ulcer and inflammation, right ([facility] )    Traumatic skin ulcer ([facility] )    Ambulatory dysfunction    Anemia    Supratherapeutic INR    Stage 3b chronic kidney disease ([facility] )       Past Medical History:   Past Medical History:   Diagnosis Date    Cancer [facility])     [facility]     Chronic kidney disease     COPD [facility]chronic obstructive pulmonary disease) [facility]     COVID-19     Decubitus ulcer of heel     LAST ASSESSED [id number]    Diabetes mellitus (HCC)     History of varicose veins     Hypertension     Intermittent claudication (HCC)     Neuropathy     Seasonal allergies        Surgical History:   Past Surgical History:   Procedure Laterality Date    AMPUTATION ABOVE KNEE (AKA) Left 7/31/2019    Procedure: AMPUTATION ABOVE KNEE (AKA); Surgeon: Kimmy Matias MD;  Location: [facility];  Service: General    ANGIOPLASTY      W/ FLUOROSC ANGIOGRAPGY PERIPHERAL ARTERY ADDITIONAL  LAST ASSESSED [id number]    ANGIOPLASTY / STENTING FEMORAL      TANSCATH INTRAVASCULAR STENT PLACEMENT PERCUTANEOUS FEMORAL     COLONOSCOPY  2010    CT BONE MARROW BIOPSY AND ASPIRATION  8/9/2019    FULL THICKNESS SKIN GRAFT      TENDON REPAIR      TOE AMPUTATION Left 12/27/2018    Procedure: AMPUTATION left 4th TOE;  Surgeon: Bhavana Payan DPM;  Location: [facility];  Service: Podiatry       Family History:    Family History   Problem Relation Age of Onset    Other Mother         CARDIAC DISORDER     Diabetes Mother     Cancer Father     Other Family         CARDIAC DISORDER     Diabetes Family     Cancer Family     Mental illness Family     Kidney disease Sister     Diabetes Sister        Cancer-related family history includes Cancer in his family and father      Social History:   Social History     Socioeconomic History    Marital status:      Spouse name: Not on file    Number of children: 1    Years of education: Not on file    Highest education level: Not on file   Occupational History    Occupation: RETIRED   Tobacco Use    Smoking status: Never Smoker    Smokeless tobacco: Never Used   Vaping Use    Vaping Use: Never used   Substance and Sexual Activity    Alcohol use: Not Currently    Drug use: Not Currently    Sexual activity: Not Currently   Other Topics Concern    Not on file   Social History Narrative    DENIED [address]    FEELS SAFE AT HOME    LIVES WITH FAMILY - SISTER    NO LIVING WILL    POA IN EXISTENCE     SUPPORTIVE AND SAFE    One son, 2 granddaughters     Social Determinants of Health     Financial Resource Strain: Not on file   Food Insecurity: Not on file   Transportation Needs: Not on file   Physical Activity: Not on file   Stress: Not on file   Social Connections: Not on file   Intimate Partner Violence: Not on file   Housing Stability: Not on file       Current Medications:   Current Outpatient Medications   Medication Sig Dispense Refill    acetaminophen (TYLENOL) 500 mg tablet Take 1 tablet (500 mg total) by mouth every 6 (six) hours as needed for mild pain, headaches or fever 90 tablet 1    albuterol (PROVENTIL HFA,VENTOLIN HFA) 90 mcg/act inhaler Inhale 2 puffs every 6 (six) hours as needed for wheezing 1 Inhaler 0    Alcohol Swabs (ALCOHOL PREP) 70 % PADS   0    allopurinol (ZYLOPRIM) 300 mg tablet TAKE ONE TABLET BY MOUTH DAILY (Patient taking differently: 300 mg) 30 tablet 5    ammonium lactate (LAC-HYDRIN) 12 % lotion APPLY TO BLE TOPICALLY DAILY 400 g 2    atorvastatin (LIPITOR) 40 mg tablet Take 1 tablet (40 mg total) by mouth daily after dinner 30 tablet 0    BD AUTOSHIELD DUO 30G X 5 MM MISC USE AS DIRECTED WITH INSULIN FOUR TIMES A  each 3    BD INSULIN SYRINGE U/F 31G X 5/16" 0 5 ML MISC USE AS DIRECTED WITH NOVOLIN 70/30  0    BD PEN NEEDLE HILARIO U/F 32G X 4 MM MISC by Other route 3 (three) times a day 300 each 1    bisacodyl (DULCOLAX) 5 mg EC tablet Take 5 mg by mouth 2 (two) times a day      docusate sodium (COLACE) 100 mg capsule Take 1 capsule (100 mg total) by mouth 2 (two) times a day  0    Easy Touch Safety Lancets 28G MISC USE 4 TIMES DAILY TO TEST BLOOD SUGAR 100 each 5    fluticasone (FLONASE) 50 mcg/act nasal spray       fluticasone-salmeterol (ADVAIR DISKUS, WIXELA INHUB) 250-50 mcg/dose inhaler Inhale 1 puff 2 (two) times a day Rinse mouth after use   1 Inhaler 5    insulin glargine (Lantus SoloStar) 100 units/mL injection pen Inject 22 Units under the skin daily  0    liraglutide (Victoza) injection Inject 1 8 mg under the skin daily      loperamide (IMODIUM) 2 mg capsule Take 2 mg by mouth 2 (two) times a day as needed for diarrhea      magnesium hydroxide (MILK OF MAGNESIA) 400 mg/5 mL oral suspension Take 30 mL by mouth daily as needed for constipation      Melatonin 5 MG TABS TAKE ONE TABLET BY MOUTH EVERY NIGHT AT BEDTIME 30 tablet 2    metFORMIN (GLUCOPHAGE) 1000 MG tablet TAKE ONE TABLET BY MOUTH TWO TIMES A DAY (Patient taking differently: Take 1,000 mg by mouth 2 (two) times a day with meals) 60 tablet 2    metoprolol succinate (TOPROL-XL) 25 mg 24 hr tablet Take 1 tablet (25 mg total) by mouth daily  0    NOVOLOG FLEXPEN 100 units/mL SOPN INJ 5U BEFORE MEALS AND PER SS <250=NO CALL 250-300=5U,301-350= 10U,351-400=15U,401-450=20U>451 CALL FOR ORDERS UP TO 4X PER DAY 15 mL 5    nystatin (MYCOSTATIN) powder Apply 213,138 application topically 4 (four) times a day      ondansetron (ZOFRAN) 4 mg tablet ondansetron HCl 4 mg tablet   TAKE 1 TABLET BY MOUTH EVERY 8 HOURS AS NEEDED      polyethylene glycol (GOLYTELY) 4000 mL solution Please follow prep instructions provided by the office (Patient taking differently: 17 g Please follow prep instructions provided by the office) 4000 mL 0    polyethylene glycol (MIRALAX) 17 g packet Take 17 g by mouth daily  0    potassium chloride (K-DUR,KLOR-CON) 20 mEq tablet Take 2 tablets (40 mEq total) by mouth daily  0    torsemide (DEMADEX) 20 mg tablet Take 2 tablets (40 mg total) by mouth 2 (two) times a day  0    warfarin (COUMADIN) 6 mg tablet       bisacodyl (DULCOLAX) 10 mg suppository Insert 10 mg into the rectum as needed for constipation (Patient not taking: No sig reported)      bisacodyl (DULCOLAX) 5 mg EC tablet Please follow prep instructions provided by the office  (Patient not taking: No sig reported) 2 tablet 0    bortezomib (VELCADE) Infuse into a venous catheter once   (Patient not taking: No sig reported)      clotrimazole (LOTRIMIN) 1 % cream APPLY TO BUTTOCK 2 TIMES DAILY (Patient not taking: No sig reported) 45 g 3    colchicine (COLCRYS) 0 6 mg tablet Take 1 tablet (0 6 mg total) by mouth daily (Patient not taking: No sig reported) 2 tablet 0    multivitamin-minerals therapeutic (THERA-M)  (Patient not taking: No sig reported)      omeprazole (PriLOSEC) 40 MG capsule Take 1 capsule (40 mg total) by mouth daily Half an hour prior to breakfast (Patient not taking: No sig reported) 30 capsule 2    polyethylene glycol (GOLYTELY) 4000 mL solution Take 4,000 mL by mouth once for 1 dose (Patient not taking: Reported on 7/18/2022) 4000 mL 0    warfarin (COUMADIN) 1 mg tablet Take 1 mg by mouth daily at bedtime 2 mg on Wed and Sun (Patient not taking: No sig reported)       No current facility-administered medications for this visit  Allergies: Allergies   Allergen Reactions    Latex Rash       Physical Exam:    There is no height or weight on file to calculate BSA  Wt Readings from Last 3 Encounters:   07/12/22 128 kg (281 lb 12 8 oz)   07/05/22 125 kg (275 lb)   06/28/22 125 kg (275 lb 5 7 oz)        Temp Readings from Last 3 Encounters:   07/18/22 98 °F (36 7 °C) (Temporal)   07/12/22 97 7 °F (36 5 °C) (Temporal)   07/05/22 98 9 °F (37 2 °C) (Tympanic)        BP Readings from Last 3 Encounters:   07/18/22 120/70   07/12/22 145/92   07/05/22 132/80         Pulse Readings from Last 3 Encounters:   07/18/22 64   07/12/22 76   07/05/22 78         Physical Exam     Constitutional   General appearance: No acute distress, well appearing and well nourished      Eyes   Conjunctiva and lids: No swelling, erythema or discharge  Pupils and irises: Equal, round and reactive to light  Ears, Nose, Mouth, and Throat   External inspection of ears and nose: Normal     Nasal mucosa, septum, and turbinates: Normal without edema or erythema  Oropharynx: Normal with no erythema, edema, exudate or lesions  Pulmonary   Respiratory effort: No increased work of breathing or signs of respiratory distress  Auscultation of lungs: Clear to auscultation  Cardiovascular   Palpation of heart: Normal PMI, no thrills  Auscultation of heart: Normal rate and rhythm, normal S1 and S2, without murmurs  Examination of extremities for edema and/or varicosities: Normal     Carotid pulses: Normal     Abdomen   Abdomen: Non-tender, no masses  Liver and spleen: No hepatomegaly or splenomegaly  Lymphatic   Palpation of lymph nodes in neck: No lymphadenopathy  Musculoskeletal   Gait and station: Normal     Digits and nails: Normal without clubbing or cyanosis  Inspection/palpation of joints, bones, and muscles: Normal     Skin   Skin and subcutaneous tissue: Normal without rashes or lesions  Neurologic   Cranial nerves: Cranial nerves 2-12 intact  Sensation: No sensory loss  Psychiatric   Orientation to person, place, and time: Normal     Mood and affect: Normal         Assessment / Plan:      The patient is a very pleasant 72 yo male with multiple comorbidities including diabetes and hypertension with bone marrow biopsy proven multiple myeloma  He was referred to us in 8/2019 after 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  He was noted to have elevated kidney function and anemia which prompted work up for multiple myeloma  His skeletal survey was negative  He underwent bone marrow biopsy confirming multiple myeloma  He was initiated on treatment with with Velcade and Decadron weekly on a 35 day schedule   Velcade is 1 3 milligrams/meter squared and Decadron is 20 mg p o  On days 1, 8, 15, 22  Skeletal survey from 11/2020 showed no evidence of lytic bony lesions  CT CAP done during his last hospitalization on 10/4/21 showed no acute osseous pathology      He continues to tolerate his treatment well  His CBC remains in acceptable range  Hemoglobin is 9 1 which I suspect may be related to his renal disease also in addition to the myeloma  White count 5 8 with a platelet count of 697  Beta-2 microglobulin is elevated at 4 9  IgA was 154 with an IgG of 367 and IgM of less than 21  SPEP with immunofixation results are not available as they were pending at the time of the report that was sent to me  At this point I have sent a note to the nursing facility to please make sure they draw his free light chain ratio every 2 months along with all of his blood work at least 1-2 weeks prior to his appointment with me so I have the results and can make decisions based on this  The patient himself is quite frustrated  He states he has blood work drawn every week and cannot understand why appropriate blood work was not drawn  Regardless at this point he will stay on his current regimen  I will see him back in 2 months  Until then if he has any questions he will call our office  Goals and Barriers:  Current Goal:  Prolong Survival from multiple myeloma  Barriers: None  Patient's Capacity to Self Care:  Patient able to self care  Portions of the record may have been created with voice recognition software  Occasional wrong word or "sound a like" substitutions may have occurred due to the inherent limitations of voice recognition software  Read the chart carefully and recognize, using context, where substitutions have occurred

## 2022-07-19 ENCOUNTER — HOSPITAL ENCOUNTER (OUTPATIENT)
Dept: INFUSION CENTER | Facility: HOSPITAL | Age: 72
Discharge: HOME | End: 2022-07-19
Attending: INTERNAL MEDICINE
Payer: COMMERCIAL

## 2022-07-19 VITALS
TEMPERATURE: 96.6 F | DIASTOLIC BLOOD PRESSURE: 60 MMHG | RESPIRATION RATE: 14 BRPM | BODY MASS INDEX: 38.07 KG/M2 | HEIGHT: 72 IN | WEIGHT: 281.09 LBS | HEART RATE: 61 BPM | SYSTOLIC BLOOD PRESSURE: 134 MMHG | OXYGEN SATURATION: 98 %

## 2022-07-19 DIAGNOSIS — C90.00 MULTIPLE MYELOMA NOT HAVING ACHIEVED REMISSION (HCC): Primary | ICD-10-CM

## 2022-07-19 PROCEDURE — 96401 CHEMO ANTI-NEOPL SQ/IM: CPT

## 2022-07-19 RX ORDER — DEXAMETHASONE 4 MG/1
20 TABLET ORAL ONCE
Status: COMPLETED | OUTPATIENT
Start: 2022-07-19 | End: 2022-07-19

## 2022-07-19 RX ADMIN — BORTEZOMIB 3.4 MG: 3.5 INJECTION, POWDER, LYOPHILIZED, FOR SOLUTION INTRAVENOUS; SUBCUTANEOUS at 11:38

## 2022-07-19 RX ADMIN — DEXAMETHASONE 20 MG: 4 TABLET ORAL at 11:24

## 2022-07-19 NOTE — PROGRESS NOTES
Pt received velcade injection into R abdomen  No complications noted  Band aid applied  Next appt scheduled  Pt left by stretcher with ems for d/c

## 2022-07-20 ENCOUNTER — TELEPHONE (OUTPATIENT)
Dept: GASTROENTEROLOGY | Facility: CLINIC | Age: 72
End: 2022-07-20

## 2022-07-20 NOTE — TELEPHONE ENCOUNTER
Patients GI provider:  Dr Lelia Joshi     Number to return call: [phone]      Reason for call: [facility] called requesting to see if patient is able to have a different prep since last time he was unable to complete pret please reach put to [facility]       Scheduled procedure/appointment date if applicable: procedure 08/61/4331

## 2022-07-22 ENCOUNTER — TELEPHONE (OUTPATIENT)
Dept: NEPHROLOGY | Facility: CLINIC | Age: 72
End: 2022-07-22

## 2022-07-22 NOTE — TELEPHONE ENCOUNTER
Spoke with [facility] and schedule appointment for 11/4/22  9:30 with Dr Elvin Franz in the [facility]

## 2022-07-25 ENCOUNTER — ANESTHESIA (OUTPATIENT)
Dept: GASTROENTEROLOGY | Facility: HOSPITAL | Age: 72
End: 2022-07-25

## 2022-07-25 ENCOUNTER — ANESTHESIA EVENT (OUTPATIENT)
Dept: GASTROENTEROLOGY | Facility: HOSPITAL | Age: 72
End: 2022-07-25

## 2022-07-25 ENCOUNTER — HOSPITAL ENCOUNTER (OUTPATIENT)
Dept: GASTROENTEROLOGY | Facility: HOSPITAL | Age: 72
Setting detail: OUTPATIENT SURGERY
Discharge: HOME | End: 2022-07-25
Attending: INTERNAL MEDICINE
Payer: COMMERCIAL

## 2022-07-25 VITALS
OXYGEN SATURATION: 100 % | TEMPERATURE: 98.3 F | HEART RATE: 71 BPM | DIASTOLIC BLOOD PRESSURE: 68 MMHG | RESPIRATION RATE: 18 BRPM | SYSTOLIC BLOOD PRESSURE: 118 MMHG

## 2022-07-25 DIAGNOSIS — K59.00 CONSTIPATION, UNSPECIFIED CONSTIPATION TYPE: ICD-10-CM

## 2022-07-25 DIAGNOSIS — Z12.11 COLON CANCER SCREENING: ICD-10-CM

## 2022-07-25 LAB — GLUCOSE SERPL-MCNC: 117 MG/DL (ref 65–140)

## 2022-07-25 PROCEDURE — 88305 TISSUE EXAM BY PATHOLOGIST: CPT | Performed by: PATHOLOGY

## 2022-07-25 PROCEDURE — 82948 REAGENT STRIP/BLOOD GLUCOSE: CPT

## 2022-07-25 PROCEDURE — 45385 COLONOSCOPY W/LESION REMOVAL: CPT | Performed by: INTERNAL MEDICINE

## 2022-07-25 RX ORDER — SODIUM CHLORIDE 9 MG/ML
INJECTION, SOLUTION INTRAVENOUS CONTINUOUS PRN
Status: DISCONTINUED | OUTPATIENT
Start: 2022-07-25 | End: 2022-07-25

## 2022-07-25 RX ORDER — PROPOFOL 10 MG/ML
INJECTION, EMULSION INTRAVENOUS AS NEEDED
Status: DISCONTINUED | OUTPATIENT
Start: 2022-07-25 | End: 2022-07-25

## 2022-07-25 RX ADMIN — PROPOFOL 50 MG: 10 INJECTION, EMULSION INTRAVENOUS at 13:24

## 2022-07-25 RX ADMIN — PROPOFOL 50 MG: 10 INJECTION, EMULSION INTRAVENOUS at 13:18

## 2022-07-25 RX ADMIN — PROPOFOL 50 MG: 10 INJECTION, EMULSION INTRAVENOUS at 13:36

## 2022-07-25 RX ADMIN — PROPOFOL 50 MG: 10 INJECTION, EMULSION INTRAVENOUS at 13:04

## 2022-07-25 RX ADMIN — PROPOFOL 50 MG: 10 INJECTION, EMULSION INTRAVENOUS at 13:29

## 2022-07-25 RX ADMIN — PROPOFOL 50 MG: 10 INJECTION, EMULSION INTRAVENOUS at 13:12

## 2022-07-25 RX ADMIN — PROPOFOL 50 MG: 10 INJECTION, EMULSION INTRAVENOUS at 13:07

## 2022-07-25 RX ADMIN — SODIUM CHLORIDE: 0.9 INJECTION, SOLUTION INTRAVENOUS at 13:03

## 2022-07-25 NOTE — ANESTHESIA PREPROCEDURE EVALUATION
Procedure:  COLONOSCOPY    Relevant Problems   CARDIO   (+) Acute on chronic combined systolic and diastolic congestive heart failure (HCC)   (+) Chronic atrial fibrillation (HCC)   (+) Essential hypertension   (+) Hyperlipidemia   (+) Type 2 acute myocardial infarction (HCC)      GI/HEPATIC   (+) GERD (gastroesophageal reflux disease)   (+) Hiatal hernia      /RENAL   (+) DORA (acute kidney injury) (HCC)   (+) CKD (chronic kidney disease)   (+) Hypertensive chronic kidney disease w stg 1-4/unsp chr kdny   (+) Stage 3b chronic kidney disease (HCC)      HEMATOLOGY   (+) Anemia   (+) Multiple myeloma (HCC)      MUSCULOSKELETAL   (+) Gout      NEURO/PSYCH   (+) Diabetes, polyneuropathy (HCC)      PULMONARY   (+) Chronic obstructive pulmonary disease, unspecified (HCC)   (+) Moderate persistent asthma without complication   (+) NALLELY (obstructive sleep apnea)   (+) SOB (shortness of breath)      PFTs 5/7/2019  Interpretation:     · Severe obstructive airflow defect      · No significant response to the administration to bronchodilator per ATS Standards although the FVC did improve by 11% and 300 mLs      · Normal TLC with increased residual volume indicative of air trapping     · Moderately decreased diffusion capacity    Physical Exam    Airway    Mallampati score: II  TM Distance: <3 FB  Neck ROM: full     Dental       Cardiovascular  Rhythm: irregular, Rate: normal,     Pulmonary  Breath sounds clear to auscultation,     Other Findings      Stress Echo 10/15/2021  IMPRESSION:  Abnormal study after pharmacological stress  No chest pain after regadenoson infusion  Non-diagnostic ECG after pharmacological stress  Small, fixed, basal to mid inferior defect, likely due to diaphragmatic attenuation with no definitive evidence for ischemia; no prone imaging obtained  No TID  Gated left ventricular systolic function is mildly to moderately reduced at 36%  Echo 10/5/2021  LEFT VENTRICLE: The ventricle was dilated   Systolic function was mildly reduced  Ejection fraction was estimated to be 45 %  There was mild diffuse hypokinesis with regional variations  Wall thickness was mildly increased  The changes were  consistent with eccentric hypertrophy  DOPPLER: Left ventricular diastolic function parameters were abnormal  Doppler parameters were consistent with high ventricular filling pressure      RIGHT VENTRICLE: The ventricle was moderately dilated  Systolic function was moderately reduced      LEFT ATRIUM: The atrium was moderately dilated      RIGHT ATRIUM: The atrium was mildly dilated      MITRAL VALVE: There was mild annular calcification  There was normal leaflet separation  DOPPLER: The transmitral velocity was within the normal range  There was no evidence for stenosis  There was mild regurgitation      AORTIC VALVE: The valve was trileaflet  Leaflets exhibited normal cuspal separation and sclerosis  DOPPLER: Transaortic velocity was within the normal range  There was no evidence for stenosis  There was no regurgitation      TRICUSPID VALVE: The valve structure was normal  There was normal leaflet separation  DOPPLER: The transtricuspid velocity was within the normal range  There was no evidence for stenosis  There was trace regurgitation      PULMONIC VALVE: Leaflets exhibited normal thickness, no calcification, and normal cuspal separation  DOPPLER: The transpulmonic velocity was within the normal range  There was no significant regurgitation      PERICARDIUM: There was no pericardial effusion  The pericardium was normal in appearance      AORTA: The root exhibited normal size  There was mild dilatation of the ascending aorta (4 2 cm)      SYSTEMIC VEINS: IVC: The inferior vena cava was mildly dilated  Respirophasic changes were normal       Anesthesia Plan  ASA Score- 4     Anesthesia Type- IV sedation with anesthesia with ASA Monitors           Additional Monitors:   Airway Plan:           Plan Factors-Exercise tolerance (METS): <4 METS  Chart reviewed  Existing labs reviewed  Patient summary reviewed  Patient is not a current smoker  Obstructive sleep apnea risk education given perioperatively  Induction- intravenous  Postoperative Plan-     Informed Consent- Anesthetic plan and risks discussed with patient  I personally reviewed this patient with the CRNA  Discussed and agreed on the Anesthesia Plan with the CRNA Gerhardt Sep

## 2022-07-25 NOTE — ANESTHESIA POSTPROCEDURE EVALUATION
Post-Op Assessment Note    CV Status:  Stable  Pain Score: 0    Pain management: adequate     Mental Status:  Arousable   Hydration Status:  Stable   PONV Controlled:  None   Airway Patency:  Patent      Post Op Vitals Reviewed: Yes      Staff: CRNA         No complications documented      BP      Temp      Pulse     Resp      SpO2

## 2022-07-26 ENCOUNTER — HOSPITAL ENCOUNTER (OUTPATIENT)
Dept: INFUSION CENTER | Facility: HOSPITAL | Age: 72
Discharge: HOME | End: 2022-07-26
Attending: INTERNAL MEDICINE
Payer: COMMERCIAL

## 2022-07-26 VITALS
RESPIRATION RATE: 16 BRPM | OXYGEN SATURATION: 100 % | DIASTOLIC BLOOD PRESSURE: 63 MMHG | TEMPERATURE: 97.2 F | WEIGHT: 288.36 LBS | HEART RATE: 63 BPM | BODY MASS INDEX: 39.06 KG/M2 | HEIGHT: 72 IN | SYSTOLIC BLOOD PRESSURE: 145 MMHG

## 2022-07-26 DIAGNOSIS — C90.00 MULTIPLE MYELOMA NOT HAVING ACHIEVED REMISSION (HCC): Primary | ICD-10-CM

## 2022-07-26 PROCEDURE — 96401 CHEMO ANTI-NEOPL SQ/IM: CPT

## 2022-07-26 RX ORDER — DEXAMETHASONE 4 MG/1
20 TABLET ORAL ONCE
Status: COMPLETED | OUTPATIENT
Start: 2022-07-26 | End: 2022-07-26

## 2022-07-26 RX ADMIN — DEXAMETHASONE 20 MG: 4 TABLET ORAL at 10:52

## 2022-07-26 RX ADMIN — BORTEZOMIB 3.4 MG: 3.5 INJECTION, POWDER, LYOPHILIZED, FOR SOLUTION INTRAVENOUS; SUBCUTANEOUS at 11:50

## 2022-07-26 NOTE — PROGRESS NOTES
Pt received velcade injection one injection into R side of abd  Pt has next appts scheduled  Called [facility] for pickup

## 2022-08-01 ENCOUNTER — APPOINTMENT (EMERGENCY)
Dept: CT IMAGING | Facility: HOSPITAL | Age: 72
DRG: 391 | End: 2022-08-01
Payer: COMMERCIAL

## 2022-08-01 ENCOUNTER — HOSPITAL ENCOUNTER (INPATIENT)
Facility: HOSPITAL | Age: 72
LOS: 9 days | Discharge: NURSING, REHAB OR LONG-TERM CARE | DRG: 391 | End: 2022-08-10
Attending: EMERGENCY MEDICINE | Admitting: HOSPITALIST
Payer: COMMERCIAL

## 2022-08-01 DIAGNOSIS — N18.32 STAGE 3B CHRONIC KIDNEY DISEASE (HCC): ICD-10-CM

## 2022-08-01 DIAGNOSIS — N17.9 AKI (ACUTE KIDNEY INJURY) (HCC): ICD-10-CM

## 2022-08-01 DIAGNOSIS — K57.92 ACUTE DIVERTICULITIS: Primary | ICD-10-CM

## 2022-08-01 DIAGNOSIS — N18.9 CKD (CHRONIC KIDNEY DISEASE): ICD-10-CM

## 2022-08-01 DIAGNOSIS — E11.641 UNCONTROLLED TYPE 2 DIABETES MELLITUS WITH HYPOGLYCEMIA AND COMA (HCC): Chronic | ICD-10-CM

## 2022-08-01 DIAGNOSIS — I48.20 CHRONIC ATRIAL FIBRILLATION (HCC): Chronic | ICD-10-CM

## 2022-08-01 DIAGNOSIS — R00.1 BRADYCARDIA: ICD-10-CM

## 2022-08-01 LAB
ALBUMIN SERPL BCP-MCNC: 3.1 G/DL (ref 3.5–5)
ALP SERPL-CCNC: 109 U/L (ref 46–116)
ALT SERPL W P-5'-P-CCNC: 23 U/L (ref 12–78)
ANION GAP SERPL CALCULATED.3IONS-SCNC: 17 MMOL/L (ref 4–13)
AST SERPL W P-5'-P-CCNC: 15 U/L (ref 5–45)
BASE EXCESS BLDA CALC-SCNC: -17 MMOL/L (ref -2–3)
BASOPHILS # BLD MANUAL: 0 THOUSAND/UL (ref 0–0.1)
BASOPHILS NFR MAR MANUAL: 0 % (ref 0–1)
BILIRUB SERPL-MCNC: 0.5 MG/DL (ref 0.2–1)
BUN SERPL-MCNC: 95 MG/DL (ref 5–25)
CA-I BLD-SCNC: 1.28 MMOL/L (ref 1.12–1.32)
CALCIUM ALBUM COR SERPL-MCNC: 9.5 MG/DL (ref 8.3–10.1)
CALCIUM SERPL-MCNC: 8.8 MG/DL (ref 8.3–10.1)
CHLORIDE SERPL-SCNC: 114 MMOL/L (ref 96–108)
CO2 SERPL-SCNC: 12 MMOL/L (ref 21–32)
CREAT SERPL-MCNC: 5.78 MG/DL (ref 0.6–1.3)
EOSINOPHIL # BLD MANUAL: 0 THOUSAND/UL (ref 0–0.4)
EOSINOPHIL NFR BLD MANUAL: 0 % (ref 0–6)
ERYTHROCYTE [DISTWIDTH] IN BLOOD BY AUTOMATED COUNT: 19.7 % (ref 11.6–15.1)
GFR SERPL CREATININE-BSD FRML MDRD: 9 ML/MIN/1.73SQ M
GLUCOSE SERPL-MCNC: 64 MG/DL (ref 65–140)
GLUCOSE SERPL-MCNC: 91 MG/DL (ref 65–140)
GLUCOSE SERPL-MCNC: 93 MG/DL (ref 65–140)
HCO3 BLDA-SCNC: 10 MMOL/L (ref 24–30)
HCT VFR BLD AUTO: 29.4 % (ref 36.5–49.3)
HCT VFR BLD CALC: 22 % (ref 36.5–49.3)
HELMET CELLS BLD QL SMEAR: PRESENT
HGB BLD-MCNC: 9 G/DL (ref 12–17)
HGB BLDA-MCNC: 7.5 G/DL (ref 12–17)
LACTATE SERPL-SCNC: 1.9 MMOL/L (ref 0.5–2)
LIPASE SERPL-CCNC: 443 U/L (ref 73–393)
LYMPHOCYTES # BLD AUTO: 1.77 THOUSAND/UL (ref 0.6–4.47)
LYMPHOCYTES # BLD AUTO: 17 % (ref 14–44)
MCH RBC QN AUTO: 26.5 PG (ref 26.8–34.3)
MCHC RBC AUTO-ENTMCNC: 30.6 G/DL (ref 31.4–37.4)
MCV RBC AUTO: 87 FL (ref 82–98)
METAMYELOCYTES NFR BLD MANUAL: 1 % (ref 0–1)
MICROCYTES BLD QL AUTO: PRESENT
MONOCYTES # BLD AUTO: 0.1 THOUSAND/UL (ref 0–1.22)
MONOCYTES NFR BLD: 1 % (ref 4–12)
NEUTROPHILS # BLD MANUAL: 8.46 THOUSAND/UL (ref 1.85–7.62)
NEUTS SEG NFR BLD AUTO: 81 % (ref 43–75)
NRBC BLD AUTO-RTO: 1 /100 WBC (ref 0–2)
PCO2 BLD: 11 MMOL/L (ref 21–32)
PCO2 BLD: 28.4 MM HG (ref 42–50)
PH BLD: 7.16 [PH] (ref 7.3–7.4)
PLATELET # BLD AUTO: 162 THOUSANDS/UL (ref 149–390)
PLATELET BLD QL SMEAR: ADEQUATE
PMV BLD AUTO: 11.7 FL (ref 8.9–12.7)
PO2 BLD: 38 MM HG (ref 35–45)
POTASSIUM BLD-SCNC: 5.3 MMOL/L (ref 3.5–5.3)
POTASSIUM SERPL-SCNC: 5.5 MMOL/L (ref 3.5–5.3)
PROT SERPL-MCNC: 6.7 G/DL (ref 6.4–8.4)
RBC # BLD AUTO: 3.4 MILLION/UL (ref 3.88–5.62)
SAO2 % BLD FROM PO2: 58 % (ref 60–85)
SODIUM BLD-SCNC: 145 MMOL/L (ref 136–145)
SODIUM SERPL-SCNC: 143 MMOL/L (ref 135–147)
SPECIMEN SOURCE: ABNORMAL
SPHEROCYTES BLD QL SMEAR: PRESENT
WBC # BLD AUTO: 10.44 THOUSAND/UL (ref 4.31–10.16)

## 2022-08-01 PROCEDURE — 84295 ASSAY OF SERUM SODIUM: CPT

## 2022-08-01 PROCEDURE — 85014 HEMATOCRIT: CPT

## 2022-08-01 PROCEDURE — 85007 BL SMEAR W/DIFF WBC COUNT: CPT | Performed by: EMERGENCY MEDICINE

## 2022-08-01 PROCEDURE — 83690 ASSAY OF LIPASE: CPT | Performed by: EMERGENCY MEDICINE

## 2022-08-01 PROCEDURE — 80053 COMPREHEN METABOLIC PANEL: CPT | Performed by: EMERGENCY MEDICINE

## 2022-08-01 PROCEDURE — 96374 THER/PROPH/DIAG INJ IV PUSH: CPT

## 2022-08-01 PROCEDURE — 74176 CT ABD & PELVIS W/O CONTRAST: CPT

## 2022-08-01 PROCEDURE — 96375 TX/PRO/DX INJ NEW DRUG ADDON: CPT

## 2022-08-01 PROCEDURE — 82947 ASSAY GLUCOSE BLOOD QUANT: CPT

## 2022-08-01 PROCEDURE — 82948 REAGENT STRIP/BLOOD GLUCOSE: CPT

## 2022-08-01 PROCEDURE — 99222 1ST HOSP IP/OBS MODERATE 55: CPT

## 2022-08-01 PROCEDURE — 99285 EMERGENCY DEPT VISIT HI MDM: CPT

## 2022-08-01 PROCEDURE — 82803 BLOOD GASES ANY COMBINATION: CPT

## 2022-08-01 PROCEDURE — G1004 CDSM NDSC: HCPCS

## 2022-08-01 PROCEDURE — 83605 ASSAY OF LACTIC ACID: CPT | Performed by: EMERGENCY MEDICINE

## 2022-08-01 PROCEDURE — 82330 ASSAY OF CALCIUM: CPT

## 2022-08-01 PROCEDURE — 85027 COMPLETE CBC AUTOMATED: CPT | Performed by: EMERGENCY MEDICINE

## 2022-08-01 PROCEDURE — 99285 EMERGENCY DEPT VISIT HI MDM: CPT | Performed by: EMERGENCY MEDICINE

## 2022-08-01 PROCEDURE — 84132 ASSAY OF SERUM POTASSIUM: CPT

## 2022-08-01 PROCEDURE — 93005 ELECTROCARDIOGRAM TRACING: CPT

## 2022-08-01 PROCEDURE — 83605 ASSAY OF LACTIC ACID: CPT

## 2022-08-01 PROCEDURE — 96361 HYDRATE IV INFUSION ADD-ON: CPT

## 2022-08-01 PROCEDURE — 36415 COLL VENOUS BLD VENIPUNCTURE: CPT | Performed by: EMERGENCY MEDICINE

## 2022-08-01 RX ORDER — ONDANSETRON 2 MG/ML
4 INJECTION INTRAMUSCULAR; INTRAVENOUS ONCE
Status: COMPLETED | OUTPATIENT
Start: 2022-08-01 | End: 2022-08-01

## 2022-08-01 RX ORDER — HYDROMORPHONE HCL/PF 1 MG/ML
0.5 SYRINGE (ML) INJECTION ONCE
Status: COMPLETED | OUTPATIENT
Start: 2022-08-01 | End: 2022-08-01

## 2022-08-01 RX ADMIN — SODIUM CHLORIDE 1000 ML: 0.9 INJECTION, SOLUTION INTRAVENOUS at 22:49

## 2022-08-01 RX ADMIN — SODIUM CHLORIDE 1000 ML: 0.9 INJECTION, SOLUTION INTRAVENOUS at 20:58

## 2022-08-01 RX ADMIN — HYDROMORPHONE HYDROCHLORIDE 0.5 MG: 1 INJECTION, SOLUTION INTRAMUSCULAR; INTRAVENOUS; SUBCUTANEOUS at 21:00

## 2022-08-01 RX ADMIN — ONDANSETRON 4 MG: 2 INJECTION INTRAMUSCULAR; INTRAVENOUS at 20:59

## 2022-08-01 RX ADMIN — PIPERACILLIN SODIUM AND TAZOBACTAM SODIUM 3.38 G: 3; .375 INJECTION, POWDER, LYOPHILIZED, FOR SOLUTION INTRAVENOUS at 22:50

## 2022-08-02 ENCOUNTER — APPOINTMENT (INPATIENT)
Dept: RADIOLOGY | Facility: HOSPITAL | Age: 72
DRG: 391 | End: 2022-08-02
Payer: COMMERCIAL

## 2022-08-02 ENCOUNTER — TELEPHONE (OUTPATIENT)
Dept: HEMATOLOGY ONCOLOGY | Facility: CLINIC | Age: 72
End: 2022-08-02

## 2022-08-02 ENCOUNTER — HOSPITAL ENCOUNTER (OUTPATIENT)
Dept: INFUSION CENTER | Facility: HOSPITAL | Age: 72
Discharge: HOME | End: 2022-08-02
Attending: INTERNAL MEDICINE

## 2022-08-02 PROBLEM — E87.2 HIGH ANION GAP METABOLIC ACIDOSIS: Status: ACTIVE | Noted: 2022-08-02

## 2022-08-02 PROBLEM — E83.39 HYPERPHOSPHATEMIA: Status: ACTIVE | Noted: 2022-08-02

## 2022-08-02 PROBLEM — K57.92 ACUTE DIVERTICULITIS: Status: ACTIVE | Noted: 2022-08-02

## 2022-08-02 PROBLEM — E87.5 HYPERKALEMIA: Status: ACTIVE | Noted: 2022-01-01

## 2022-08-02 PROBLEM — E87.29 HIGH ANION GAP METABOLIC ACIDOSIS: Status: ACTIVE | Noted: 2022-08-02

## 2022-08-02 LAB
ABO GROUP BLD: NORMAL
ALBUMIN SERPL BCP-MCNC: 2.5 G/DL (ref 3.5–5)
ALBUMIN SERPL BCP-MCNC: 2.5 G/DL (ref 3.5–5)
ALBUMIN SERPL BCP-MCNC: 2.7 G/DL (ref 3.5–5)
ALP SERPL-CCNC: 100 U/L (ref 46–116)
ALP SERPL-CCNC: 101 U/L (ref 46–116)
ALP SERPL-CCNC: 101 U/L (ref 46–116)
ALT SERPL W P-5'-P-CCNC: 17 U/L (ref 12–78)
ALT SERPL W P-5'-P-CCNC: 21 U/L (ref 12–78)
ALT SERPL W P-5'-P-CCNC: 21 U/L (ref 12–78)
ANION GAP SERPL CALCULATED.3IONS-SCNC: 12 MMOL/L (ref 4–13)
ANION GAP SERPL CALCULATED.3IONS-SCNC: 14 MMOL/L (ref 4–13)
ANION GAP SERPL CALCULATED.3IONS-SCNC: 15 MMOL/L (ref 4–13)
ANION GAP SERPL CALCULATED.3IONS-SCNC: 15 MMOL/L (ref 4–13)
APTT PPP: 53 SECONDS (ref 23–37)
AST SERPL W P-5'-P-CCNC: 13 U/L (ref 5–45)
AST SERPL W P-5'-P-CCNC: 14 U/L (ref 5–45)
AST SERPL W P-5'-P-CCNC: 18 U/L (ref 5–45)
ATRIAL RATE: 93 BPM
BACTERIA UR QL AUTO: ABNORMAL /HPF
BASOPHILS # BLD AUTO: 0.02 THOUSANDS/ΜL (ref 0–0.1)
BASOPHILS # BLD AUTO: 0.02 THOUSANDS/ΜL (ref 0–0.1)
BASOPHILS NFR BLD AUTO: 0 % (ref 0–1)
BASOPHILS NFR BLD AUTO: 0 % (ref 0–1)
BETA-HYDROXYBUTYRATE: 1.2 MMOL/L
BILIRUB SERPL-MCNC: 0.5 MG/DL (ref 0.2–1)
BILIRUB SERPL-MCNC: 0.5 MG/DL (ref 0.2–1)
BILIRUB SERPL-MCNC: 0.6 MG/DL (ref 0.2–1)
BILIRUB UR QL STRIP: NEGATIVE
BLD GP AB SCN SERPL QL: NEGATIVE
BUN SERPL-MCNC: 87 MG/DL (ref 5–25)
BUN SERPL-MCNC: 89 MG/DL (ref 5–25)
BUN SERPL-MCNC: 91 MG/DL (ref 5–25)
BUN SERPL-MCNC: 92 MG/DL (ref 5–25)
CALCIUM ALBUM COR SERPL-MCNC: 9 MG/DL (ref 8.3–10.1)
CALCIUM ALBUM COR SERPL-MCNC: 9.4 MG/DL (ref 8.3–10.1)
CALCIUM ALBUM COR SERPL-MCNC: 9.4 MG/DL (ref 8.3–10.1)
CALCIUM SERPL-MCNC: 8 MG/DL (ref 8.3–10.1)
CALCIUM SERPL-MCNC: 8.1 MG/DL (ref 8.3–10.1)
CALCIUM SERPL-MCNC: 8.2 MG/DL (ref 8.3–10.1)
CALCIUM SERPL-MCNC: 8.2 MG/DL (ref 8.3–10.1)
CHLORIDE SERPL-SCNC: 115 MMOL/L (ref 96–108)
CHLORIDE SERPL-SCNC: 116 MMOL/L (ref 96–108)
CHLORIDE SERPL-SCNC: 116 MMOL/L (ref 96–108)
CHLORIDE SERPL-SCNC: 117 MMOL/L (ref 96–108)
CLARITY UR: CLEAR
CO2 SERPL-SCNC: 14 MMOL/L (ref 21–32)
CO2 SERPL-SCNC: 16 MMOL/L (ref 21–32)
COLOR UR: YELLOW
CREAT SERPL-MCNC: 5.28 MG/DL (ref 0.6–1.3)
CREAT SERPL-MCNC: 5.32 MG/DL (ref 0.6–1.3)
CREAT SERPL-MCNC: 5.38 MG/DL (ref 0.6–1.3)
CREAT SERPL-MCNC: 5.44 MG/DL (ref 0.6–1.3)
EOSINOPHIL # BLD AUTO: 0.08 THOUSAND/ΜL (ref 0–0.61)
EOSINOPHIL # BLD AUTO: 0.1 THOUSAND/ΜL (ref 0–0.61)
EOSINOPHIL NFR BLD AUTO: 1 % (ref 0–6)
EOSINOPHIL NFR BLD AUTO: 1 % (ref 0–6)
ERYTHROCYTE [DISTWIDTH] IN BLOOD BY AUTOMATED COUNT: 19.7 % (ref 11.6–15.1)
ERYTHROCYTE [DISTWIDTH] IN BLOOD BY AUTOMATED COUNT: 19.7 % (ref 11.6–15.1)
GFR SERPL CREATININE-BSD FRML MDRD: 10 ML/MIN/1.73SQ M
GFR SERPL CREATININE-BSD FRML MDRD: 10 ML/MIN/1.73SQ M
GFR SERPL CREATININE-BSD FRML MDRD: 9 ML/MIN/1.73SQ M
GFR SERPL CREATININE-BSD FRML MDRD: 9 ML/MIN/1.73SQ M
GLUCOSE SERPL-MCNC: 111 MG/DL (ref 65–140)
GLUCOSE SERPL-MCNC: 119 MG/DL (ref 65–140)
GLUCOSE SERPL-MCNC: 167 MG/DL (ref 65–140)
GLUCOSE SERPL-MCNC: 64 MG/DL (ref 65–140)
GLUCOSE SERPL-MCNC: 70 MG/DL (ref 65–140)
GLUCOSE SERPL-MCNC: 85 MG/DL (ref 65–140)
GLUCOSE SERPL-MCNC: 88 MG/DL (ref 65–140)
GLUCOSE UR STRIP-MCNC: NEGATIVE MG/DL
HCT VFR BLD AUTO: 24.4 % (ref 36.5–49.3)
HCT VFR BLD AUTO: 24.4 % (ref 36.5–49.3)
HCT VFR BLD AUTO: 26.8 % (ref 36.5–49.3)
HGB BLD-MCNC: 7.3 G/DL (ref 12–17)
HGB BLD-MCNC: 7.5 G/DL (ref 12–17)
HGB BLD-MCNC: 8.1 G/DL (ref 12–17)
HGB UR QL STRIP.AUTO: NEGATIVE
IMM GRANULOCYTES # BLD AUTO: 0.16 THOUSAND/UL (ref 0–0.2)
IMM GRANULOCYTES # BLD AUTO: 0.23 THOUSAND/UL (ref 0–0.2)
IMM GRANULOCYTES NFR BLD AUTO: 2 % (ref 0–2)
IMM GRANULOCYTES NFR BLD AUTO: 2 % (ref 0–2)
INR PPP: 2.41 (ref 0.84–1.19)
INR PPP: 2.56 (ref 0.84–1.19)
KETONES UR STRIP-MCNC: NEGATIVE MG/DL
LACTATE SERPL-SCNC: 0.9 MMOL/L (ref 0.5–2)
LACTATE SERPL-SCNC: 0.9 MMOL/L (ref 0.5–2)
LEUKOCYTE ESTERASE UR QL STRIP: NEGATIVE
LIPASE SERPL-CCNC: 338 U/L (ref 73–393)
LYMPHOCYTES # BLD AUTO: 0.73 THOUSANDS/ΜL (ref 0.6–4.47)
LYMPHOCYTES # BLD AUTO: 1.1 THOUSANDS/ΜL (ref 0.6–4.47)
LYMPHOCYTES NFR BLD AUTO: 11 % (ref 14–44)
LYMPHOCYTES NFR BLD AUTO: 8 % (ref 14–44)
MAGNESIUM SERPL-MCNC: 1.7 MG/DL (ref 1.6–2.6)
MAGNESIUM SERPL-MCNC: 1.9 MG/DL (ref 1.6–2.6)
MCH RBC QN AUTO: 26.2 PG (ref 26.8–34.3)
MCH RBC QN AUTO: 26.2 PG (ref 26.8–34.3)
MCHC RBC AUTO-ENTMCNC: 30.2 G/DL (ref 31.4–37.4)
MCHC RBC AUTO-ENTMCNC: 30.7 G/DL (ref 31.4–37.4)
MCV RBC AUTO: 85 FL (ref 82–98)
MCV RBC AUTO: 87 FL (ref 82–98)
MONOCYTES # BLD AUTO: 0.41 THOUSAND/ΜL (ref 0.17–1.22)
MONOCYTES # BLD AUTO: 0.59 THOUSAND/ΜL (ref 0.17–1.22)
MONOCYTES NFR BLD AUTO: 5 % (ref 4–12)
MONOCYTES NFR BLD AUTO: 6 % (ref 4–12)
NEUTROPHILS # BLD AUTO: 7.5 THOUSANDS/ΜL (ref 1.85–7.62)
NEUTROPHILS # BLD AUTO: 8.24 THOUSANDS/ΜL (ref 1.85–7.62)
NEUTS SEG NFR BLD AUTO: 80 % (ref 43–75)
NEUTS SEG NFR BLD AUTO: 84 % (ref 43–75)
NITRITE UR QL STRIP: NEGATIVE
NON-SQ EPI CELLS URNS QL MICRO: ABNORMAL /HPF
NRBC BLD AUTO-RTO: 1 /100 WBCS
NRBC BLD AUTO-RTO: 1 /100 WBCS
PH UR STRIP.AUTO: 5 [PH]
PHOSPHATE SERPL-MCNC: 5.1 MG/DL (ref 2.3–4.1)
PHOSPHATE SERPL-MCNC: 5.5 MG/DL (ref 2.3–4.1)
PLATELET # BLD AUTO: 132 THOUSANDS/UL (ref 149–390)
PLATELET # BLD AUTO: 146 THOUSANDS/UL (ref 149–390)
PMV BLD AUTO: 10.5 FL (ref 8.9–12.7)
PMV BLD AUTO: 11.1 FL (ref 8.9–12.7)
POTASSIUM SERPL-SCNC: 4.4 MMOL/L (ref 3.5–5.3)
POTASSIUM SERPL-SCNC: 4.8 MMOL/L (ref 3.5–5.3)
POTASSIUM SERPL-SCNC: 5.1 MMOL/L (ref 3.5–5.3)
POTASSIUM SERPL-SCNC: 5.4 MMOL/L (ref 3.5–5.3)
PROCALCITONIN SERPL-MCNC: 0.94 NG/ML
PROT SERPL-MCNC: 5.4 G/DL (ref 6.4–8.4)
PROT SERPL-MCNC: 5.5 G/DL (ref 6.4–8.4)
PROT SERPL-MCNC: 5.9 G/DL (ref 6.4–8.4)
PROT UR STRIP-MCNC: NEGATIVE MG/DL
PROTHROMBIN TIME: 27.5 SECONDS (ref 11.6–14.5)
PROTHROMBIN TIME: 28.8 SECONDS (ref 11.6–14.5)
QRS AXIS: 67 DEGREES
QRSD INTERVAL: 134 MS
QT INTERVAL: 434 MS
QTC INTERVAL: 468 MS
RBC # BLD AUTO: 2.86 MILLION/UL (ref 3.88–5.62)
RBC # BLD AUTO: 3.09 MILLION/UL (ref 3.88–5.62)
RBC #/AREA URNS AUTO: ABNORMAL /HPF
RH BLD: POSITIVE
SODIUM SERPL-SCNC: 143 MMOL/L (ref 135–147)
SODIUM SERPL-SCNC: 144 MMOL/L (ref 135–147)
SODIUM SERPL-SCNC: 145 MMOL/L (ref 135–147)
SODIUM SERPL-SCNC: 146 MMOL/L (ref 135–147)
SP GR UR STRIP.AUTO: 1.02 (ref 1–1.03)
SPECIMEN EXPIRATION DATE: NORMAL
T WAVE AXIS: 109 DEGREES
UROBILINOGEN UR QL STRIP.AUTO: 0.2 E.U./DL
VENTRICULAR RATE: 70 BPM
WBC # BLD AUTO: 10.26 THOUSAND/UL (ref 4.31–10.16)
WBC # BLD AUTO: 8.92 THOUSAND/UL (ref 4.31–10.16)
WBC #/AREA URNS AUTO: ABNORMAL /HPF

## 2022-08-02 PROCEDURE — 80053 COMPREHEN METABOLIC PANEL: CPT | Performed by: PHYSICIAN ASSISTANT

## 2022-08-02 PROCEDURE — 85018 HEMOGLOBIN: CPT | Performed by: INTERNAL MEDICINE

## 2022-08-02 PROCEDURE — 99223 1ST HOSP IP/OBS HIGH 75: CPT | Performed by: INTERNAL MEDICINE

## 2022-08-02 PROCEDURE — 99232 SBSQ HOSP IP/OBS MODERATE 35: CPT | Performed by: PHYSICIAN ASSISTANT

## 2022-08-02 PROCEDURE — 80048 BASIC METABOLIC PNL TOTAL CA: CPT | Performed by: INTERNAL MEDICINE

## 2022-08-02 PROCEDURE — 82948 REAGENT STRIP/BLOOD GLUCOSE: CPT

## 2022-08-02 PROCEDURE — 85730 THROMBOPLASTIN TIME PARTIAL: CPT

## 2022-08-02 PROCEDURE — 85014 HEMATOCRIT: CPT | Performed by: INTERNAL MEDICINE

## 2022-08-02 PROCEDURE — 85610 PROTHROMBIN TIME: CPT | Performed by: PHYSICIAN ASSISTANT

## 2022-08-02 PROCEDURE — 86901 BLOOD TYPING SEROLOGIC RH(D): CPT | Performed by: INTERNAL MEDICINE

## 2022-08-02 PROCEDURE — 86900 BLOOD TYPING SEROLOGIC ABO: CPT | Performed by: INTERNAL MEDICINE

## 2022-08-02 PROCEDURE — 85025 COMPLETE CBC W/AUTO DIFF WBC: CPT | Performed by: PHYSICIAN ASSISTANT

## 2022-08-02 PROCEDURE — 71045 X-RAY EXAM CHEST 1 VIEW: CPT

## 2022-08-02 PROCEDURE — 81001 URINALYSIS AUTO W/SCOPE: CPT | Performed by: NURSE PRACTITIONER

## 2022-08-02 PROCEDURE — 84145 PROCALCITONIN (PCT): CPT | Performed by: PHYSICIAN ASSISTANT

## 2022-08-02 PROCEDURE — 85610 PROTHROMBIN TIME: CPT

## 2022-08-02 PROCEDURE — 82010 KETONE BODYS QUAN: CPT | Performed by: INTERNAL MEDICINE

## 2022-08-02 PROCEDURE — 83690 ASSAY OF LIPASE: CPT | Performed by: PHYSICIAN ASSISTANT

## 2022-08-02 PROCEDURE — 84100 ASSAY OF PHOSPHORUS: CPT | Performed by: PHYSICIAN ASSISTANT

## 2022-08-02 PROCEDURE — 93010 ELECTROCARDIOGRAM REPORT: CPT | Performed by: INTERNAL MEDICINE

## 2022-08-02 PROCEDURE — C9113 INJ PANTOPRAZOLE SODIUM, VIA: HCPCS | Performed by: PHYSICIAN ASSISTANT

## 2022-08-02 PROCEDURE — 86850 RBC ANTIBODY SCREEN: CPT | Performed by: INTERNAL MEDICINE

## 2022-08-02 PROCEDURE — 83735 ASSAY OF MAGNESIUM: CPT | Performed by: PHYSICIAN ASSISTANT

## 2022-08-02 PROCEDURE — 83605 ASSAY OF LACTIC ACID: CPT | Performed by: PHYSICIAN ASSISTANT

## 2022-08-02 PROCEDURE — 86923 COMPATIBILITY TEST ELECTRIC: CPT

## 2022-08-02 RX ORDER — ONDANSETRON 2 MG/ML
4 INJECTION INTRAMUSCULAR; INTRAVENOUS EVERY 6 HOURS PRN
Status: DISCONTINUED | OUTPATIENT
Start: 2022-08-02 | End: 2022-08-10 | Stop reason: HOSPADM

## 2022-08-02 RX ORDER — ACETAMINOPHEN 325 MG/1
650 TABLET ORAL EVERY 6 HOURS PRN
Status: DISCONTINUED | OUTPATIENT
Start: 2022-08-02 | End: 2022-08-10 | Stop reason: HOSPADM

## 2022-08-02 RX ORDER — INSULIN LISPRO 100 [IU]/ML
2-12 INJECTION, SOLUTION INTRAVENOUS; SUBCUTANEOUS
Status: DISCONTINUED | OUTPATIENT
Start: 2022-08-02 | End: 2022-08-06

## 2022-08-02 RX ORDER — DEXTROSE MONOHYDRATE 25 G/50ML
25 INJECTION, SOLUTION INTRAVENOUS ONCE
Status: COMPLETED | OUTPATIENT
Start: 2022-08-02 | End: 2022-08-02

## 2022-08-02 RX ORDER — SODIUM CHLORIDE, SODIUM GLUCONATE, SODIUM ACETATE, POTASSIUM CHLORIDE, MAGNESIUM CHLORIDE, SODIUM PHOSPHATE, DIBASIC, AND POTASSIUM PHOSPHATE .53; .5; .37; .037; .03; .012; .00082 G/100ML; G/100ML; G/100ML; G/100ML; G/100ML; G/100ML; G/100ML
75 INJECTION, SOLUTION INTRAVENOUS CONTINUOUS
Status: DISCONTINUED | OUTPATIENT
Start: 2022-08-02 | End: 2022-08-02

## 2022-08-02 RX ORDER — FLUTICASONE PROPIONATE 50 MCG
1 SPRAY, SUSPENSION (ML) NASAL 2 TIMES DAILY
Status: DISCONTINUED | OUTPATIENT
Start: 2022-08-02 | End: 2022-08-10 | Stop reason: HOSPADM

## 2022-08-02 RX ORDER — PANTOPRAZOLE SODIUM 40 MG/10ML
40 INJECTION, POWDER, LYOPHILIZED, FOR SOLUTION INTRAVENOUS
Status: DISCONTINUED | OUTPATIENT
Start: 2022-08-02 | End: 2022-08-10 | Stop reason: HOSPADM

## 2022-08-02 RX ORDER — SODIUM CHLORIDE, SODIUM GLUCONATE, SODIUM ACETATE, POTASSIUM CHLORIDE, MAGNESIUM CHLORIDE, SODIUM PHOSPHATE, DIBASIC, AND POTASSIUM PHOSPHATE .53; .5; .37; .037; .03; .012; .00082 G/100ML; G/100ML; G/100ML; G/100ML; G/100ML; G/100ML; G/100ML
1000 INJECTION, SOLUTION INTRAVENOUS ONCE
Status: COMPLETED | OUTPATIENT
Start: 2022-08-02 | End: 2022-08-02

## 2022-08-02 RX ADMIN — SODIUM BICARBONATE 150 ML/HR: 84 INJECTION, SOLUTION INTRAVENOUS at 13:43

## 2022-08-02 RX ADMIN — SODIUM CHLORIDE, SODIUM GLUCONATE, SODIUM ACETATE, POTASSIUM CHLORIDE, MAGNESIUM CHLORIDE, SODIUM PHOSPHATE, DIBASIC, AND POTASSIUM PHOSPHATE 1000 ML: .53; .5; .37; .037; .03; .012; .00082 INJECTION, SOLUTION INTRAVENOUS at 01:12

## 2022-08-02 RX ADMIN — INSULIN LISPRO 2 UNITS: 100 INJECTION, SOLUTION INTRAVENOUS; SUBCUTANEOUS at 18:35

## 2022-08-02 RX ADMIN — PIPERACILLIN AND TAZOBACTAM 2.25 G: 2; .25 INJECTION, POWDER, LYOPHILIZED, FOR SOLUTION INTRAVENOUS at 10:49

## 2022-08-02 RX ADMIN — FLUTICASONE PROPIONATE 1 SPRAY: 50 SPRAY, METERED NASAL at 09:36

## 2022-08-02 RX ADMIN — SODIUM BICARBONATE 125 ML/HR: 84 INJECTION, SOLUTION INTRAVENOUS at 01:52

## 2022-08-02 RX ADMIN — SODIUM BICARBONATE 50 MEQ: 84 INJECTION, SOLUTION INTRAVENOUS at 06:26

## 2022-08-02 RX ADMIN — PIPERACILLIN AND TAZOBACTAM 2.25 G: 2; .25 INJECTION, POWDER, LYOPHILIZED, FOR SOLUTION INTRAVENOUS at 05:12

## 2022-08-02 RX ADMIN — FLUTICASONE FUROATE AND VILANTEROL TRIFENATATE 1 PUFF: 200; 25 POWDER RESPIRATORY (INHALATION) at 09:36

## 2022-08-02 RX ADMIN — FLUTICASONE PROPIONATE 1 SPRAY: 50 SPRAY, METERED NASAL at 17:29

## 2022-08-02 RX ADMIN — SODIUM CHLORIDE, SODIUM GLUCONATE, SODIUM ACETATE, POTASSIUM CHLORIDE, MAGNESIUM CHLORIDE, SODIUM PHOSPHATE, DIBASIC, AND POTASSIUM PHOSPHATE 125 ML/HR: .53; .5; .37; .037; .03; .012; .00082 INJECTION, SOLUTION INTRAVENOUS at 02:51

## 2022-08-02 RX ADMIN — SODIUM BICARBONATE 50 MEQ: 84 INJECTION INTRAVENOUS at 01:48

## 2022-08-02 RX ADMIN — PANTOPRAZOLE SODIUM 40 MG: 40 INJECTION, POWDER, FOR SOLUTION INTRAVENOUS at 09:17

## 2022-08-02 RX ADMIN — DEXTROSE MONOHYDRATE 25 ML: 25 INJECTION, SOLUTION INTRAVENOUS at 01:54

## 2022-08-02 RX ADMIN — PIPERACILLIN AND TAZOBACTAM 2.25 G: 2; .25 INJECTION, POWDER, LYOPHILIZED, FOR SOLUTION INTRAVENOUS at 17:29

## 2022-08-02 NOTE — TELEPHONE ENCOUNTER
Gold Reynolds from [facility] called this morning to CX pt due to being in pt for Diverticulitis  If ok with Dr Shannan May she asked if he could just skip this [address] and resume when next [address] is due? Thank you     8/2 treatment cancelled  Next treatment 8/16 as scheduled

## 2022-08-02 NOTE — CONSULTS
Consultation - Nephrology   Airam Espinosa 73 y o  male MRN: [id number]  Unit/Bed#: -01 SDU Encounter: [id number]    ASSESSMENT/PLAN:  DORA(POA) on CKD IIIB/IV:  Suspect prerenal etiology with volume depletion in the setting of nausea, vomiting, diarrhea, decreased oral intake   -baseline creatinine previously 1 2-1 4 prior to October 2021, more recently high 1s to low 2s   -presents with creatinine of 5 78   -creatinine improved to 5 32   -follows with Dr Sathish Dee   -imaging shows unremarkable kidneys   -bladder scan insignificant   -check urinalysis  -receiving fluid IV hydration with Plasma-Lyte and sodium bicarbonate   -continue to avoid nephrotoxins, hypotension, IV contrast   -strict I/O   -no urgent indication for RRT at this time  Anion gap acidosis: In the setting of acute renal failure and increased GI loss  -received 2 L of fluid   -lactic acid level normal, 0 9   -currently receiving hydration with isolate and bicarbonate drip  Will increase bicarb drip to 150 cc/hour and reduce isolate to 75 cc/hour  Hyperkalemia:  Initial potassium level 5 5  On supplements per medication list   -potassium level currently 5 1   -will continue to monitor and treat as needed  Avoid potassium supplementation  -recommend low-potassium diet  Currently NPO  -bladder scan negative for obstruction  -currently on bicarbonate drip  Hyperphosphatemia:  Suspected due to acute renal failure   -most recent phosphorus level 5 1   -will continue to monitor and trend phosphorus level  Consider starting on phosphorus binders if not improving with renal function  Hypertension:  Blood pressure currently stable and acceptable   -home medications:  Metoprolol 25 mg daily, torsemide 40 mg 2 times per day  -currently not on antihypertensives  -would avoid hypotension or high fluctuations in blood pressure  Diverticulitis versus colitis:  Presenting with nausea, vomiting, and diarrhea    -CT scan showed inflammatory changes of sigmoid colon, no free air or abscess   -general surgery team following  Per note, no surgical intervention indicated at this time  -GI team following   -currently on antibiotics and checking stool studies  -continues on IV fluids, NPO status  CHF:  Most recent echo with EF of 45%  -chest x-ray currently pending   -home diuretic:  Torsemide 40 mg 2 times per day  -currently holding diuretics, may give as needed   -check daily weights, follow fluid restriction, strict I/O  Multiple myeloma:  Receiving treatment with chemotherapy, last infusion 07/26/2022  Atrial fibrillation:  -maintained on beta blocker  Coumadin currently on hold  Anemia: In the setting of multiple myeloma  Following outpatient with Hematology   -continue to monitor and transfuse as needed for hemoglobin less than 7 0  Other:  COPD, obesity, GERD, NALLELY    HISTORY OF PRESENT ILLNESS:  Requesting Physician: Laisha Burger MD  Reason for Consult: DORA (POA)    Alex Wong is a 73y o  year old male with history of CKD stage IIIB/4, diabetes, CHF, multiple myeloma on chemotherapy, COPD, morbid obesity, atrial fibrillation, who was admitted to [address] [place] [address] after presenting with complaints of nausea, vomiting, diarrhea, weakness, decreased oral intake  The patient is currently alert and oriented x1  He denies any fevers or chills  He denies chest discomfort or shortness of breath  His last chemotherapy treatment was on 07/26/2022  A renal consultation is requested today for assistance in the management of DORA on CKD IIIB/IV  Unable to obtain further HPI due to patient's mental status      PAST MEDICAL HISTORY:  Past Medical History:   Diagnosis Date    Cancer [facility])     CHF (congestive heart failure) (HCC)     Chronic kidney disease     COPD [facility]ronic obstructive [facility] disease) [facility] [address] [facility]     COVID-19     Decubitus ulcer of heel     LAST ASSESSED [id number]    Diabetes mellitus ([facility] [address] )     History of varicose veins     Hypertension     Intermittent claudication (HCC)     Neuropathy     Seasonal allergies        PAST SURGICAL HISTORY:  Past Surgical History:   Procedure Laterality Date    AMPUTATION ABOVE KNEE (AKA) Left 7/31/2019    Procedure: AMPUTATION ABOVE KNEE (AKA);   Surgeon: Kenia Ambriz MD;  Location:  MAIN OR;  Service: General    ANGIOPLASTY      W/ FLUOROSC ANGIOGRAPGY PERIPHERAL ARTERY ADDITIONAL  LAST ASSESSED [id number]    ANGIOPLASTY / STENTING FEMORAL      TANSCATH INTRAVASCULAR STENT PLACEMENT PERCUTANEOUS FEMORAL     COLONOSCOPY  2010    CT BONE MARROW BIOPSY AND ASPIRATION  8/9/2019    FULL THICKNESS SKIN GRAFT      TENDON REPAIR      TOE AMPUTATION Left 12/27/2018    Procedure: AMPUTATION left 4th TOE;  Surgeon: Wendy Gonzalez DPM;  Location:  MAIN OR;  Service: Podiatry       ALLERGIES:  Allergies   Allergen Reactions    Latex Rash       SOCIAL HISTORY:  Social History     Substance and Sexual Activity   Alcohol Use Not Currently     Social History     Substance and Sexual Activity   Drug Use Not Currently     Social History     Tobacco Use   Smoking Status Never Smoker   Smokeless Tobacco Never Used       FAMILY HISTORY:  Family History   Problem Relation Age of Onset    Other Mother         CARDIAC DISORDER     Diabetes Mother     Cancer Father     Other Family         CARDIAC DISORDER     Diabetes Family     Cancer Family     Mental illness Family     Kidney disease Sister     Diabetes Sister        MEDICATIONS:    Current Facility-Administered Medications:     fluticasone (FLONASE) 50 mcg/act nasal spray 1 spray, 1 spray, Each Nare, BID, Julia Parr PA-C, 1 spray at 08/02/22 0936    fluticasone-vilanterol (BREO ELLIPTA) 200-25 MCG/INH inhaler 1 puff, 1 puff, Inhalation, Daily, Julia Parr PA-C, 1 puff at 08/02/22 0936    multi-electrolyte (PLASMALYTE-A/ISOLYTE-S PH 7 4) IV solution, 125 mL/hr, Intravenous, Continuous, Julia Parr PA-C, Last Rate: 125 mL/hr at 08/02/22 0251, 125 mL/hr at 08/02/22 0251    pantoprazole (PROTONIX) injection 40 mg, 40 mg, Intravenous, Q24H JULIEN, Julia Parr PA-C, 40 mg at 08/02/22 0917    piperacillin-tazobactam (ZOSYN) 2 25 g in sodium chloride 0 9 % 50 mL IVPB, 2 25 g, Intravenous, Q6H, Julia Parr PA-C, Stopped at 08/02/22 0548    sodium bicarbonate 150 mEq in dextrose 5 % 1,000 mL infusion, 125 mL/hr, Intravenous, Continuous, Julia Parr PA-C, Last Rate: 125 mL/hr at 08/02/22 0152, 125 mL/hr at 08/02/22 0152    REVIEW OF SYSTEMS:  A complete review of systems was performed and found to be negative unless otherwise noted in the history of present illness  General: No fevers, chills  Cardiovascular:  - chest pain, + leg edema  Respiratory: No cough, sputum production,  - shortness of breath  Gastrointestinal:  + nausea/vomiting,  + diarrhea,  - abdominal pain  Genitourinary: No hematuria  No foamy urine    No dysuria    PHYSICAL EXAM:  Current Weight: Weight - Scale: 128 kg (282 lb 6 6 oz)  First Weight: Weight - Scale: 132 kg (290 lb)  Vitals:    08/02/22 0200 08/02/22 0300 08/02/22 0400 08/02/22 0740   BP: 134/64 107/55 127/60 137/60   BP Location:   Left arm Left arm   Pulse: 74 71 75 81   Resp: 12 14 (!) 28 18   Temp:   97 6 °F (36 4 °C) 98 4 °F (36 9 °C)   TempSrc:   Oral Oral   SpO2: 97% 97% 99% 97%   Weight:       Height:           Intake/Output Summary (Last 24 hours) at 8/2/2022 0946  Last data filed at 8/2/2022 0548  Gross per 24 hour   Intake 3910 42 ml   Output 0 ml   Net 3910 42 ml     General: NAD, ill appearing   Skin: warm, dry, intact, no rash  HEENT: Moist mucous membranes, sclera anicteric, normocephalic, atraumatic  Neck: No apparent JVD appreciated  Chest:lung sounds clear B/L, on RA   CVS:Regular rate and rhythm, no murmer   Abdomen: Soft, round, non-tender, +BS, obese   Extremities: B/L LE edema present  Neuro: alert and oriented x 1  Psych: appropriate mood and affect     Invasive Devices:      Lab Results:   Results from last 7 days   Lab Units 08/02/22  0423 08/02/22  0109 08/01/22  2342 08/01/22  2050   WBC Thousand/uL 8 92 10 26*  --  10 44*   HEMOGLOBIN g/dL 7 5* 8 1*  --  9 0*   I STAT HEMOGLOBIN g/dl  --   --  7 5*  --    HEMATOCRIT % 24 4* 26 8*  --  29 4*   HEMATOCRIT, ISTAT %  --   --  22*  --    PLATELETS Thousands/uL 132* 146*  --  162   SODIUM mmol/L 144 143  --  143   POTASSIUM mmol/L 5 1 5 4*  --  5 5*   CHLORIDE mmol/L 116* 117*  --  114*   CO2 mmol/L 14* 14*  --  12*   CO2, I-STAT mmol/L  --   --  11*  --    BUN mg/dL 89* 92*  --  95*   CREATININE mg/dL 5 32* 5 44*  --  5 78*   CALCIUM mg/dL 8 2* 8 0*  --  8 8   MAGNESIUM mg/dL 1 9 1 7  --   --    PHOSPHORUS mg/dL 5 1* 5 5*  --   --    ALK PHOS U/L 101 101  --  109   ALT U/L 21 17  --  23   AST U/L 14 13  --  15   GLUCOSE, ISTAT mg/dl  --   --  64*  --

## 2022-08-02 NOTE — ASSESSMENT & PLAN NOTE
Continue Toprol when able  Patient is on Coumadin for AFib  INR is 2 5 repeat in the a m    Consideration of continuing Coumadin versus heparin drip depending on surgical interventions

## 2022-08-02 NOTE — ASSESSMENT & PLAN NOTE
Creatinine elevated at 5 78 after 2 L of IV fluids down to 5 38  Significant elevated BUN  Most likely due to diarrhea and vomiting  Continue to fluid resuscitate  Severe metabolic derangement started on a bicarb drip  Nephrology is consulted

## 2022-08-02 NOTE — ASSESSMENT & PLAN NOTE
Patient is 61-year-old female with an extensive medical history who has nausea vomiting and diarrhea with decreased p o   Intake over the last 4 days  · CT scan - acute sigmoid diverticulitis with intrahepatic and extrahepatic portal venous air is standing for ischemic bowel no pneumatosis  · Surgery is consulted- plan for NPO IV fluids IV antibiotics monitor exam - if condition worsens may need intervention  · Received 2 L of IV bolus in the ED, repeat 1 L here  · Aggressive IV resuscitation  · Stool studies and C diff  · GI consult per surgery  · Colonoscopy in 7/25   · Continue to trend lactic and white count O-Z Flap Text: The defect edges were debeveled with a #15 scalpel blade.  Given the location of the defect, shape of the defect and the proximity to free margins an O-Z flap was deemed most appropriate.  Using a sterile surgical marker, an appropriate transposition flap was drawn incorporating the defect and placing the expected incisions within the relaxed skin tension lines where possible. The area thus outlined was incised deep to adipose tissue with a #15 scalpel blade.  The skin margins were undermined to an appropriate distance in all directions utilizing iris scissors.

## 2022-08-02 NOTE — QUICK NOTE
Patient seen and examined at bedside for abdominal exam and update on plan of care  Patient states his abdomen is sore  Vitals:    08/02/22 0015   BP: 113/70   Pulse: 75   Resp:    Temp: 97 8 °F (36 6 °C)   SpO2: 96%         Patient is in no acute distress  Obese  Laying in bed  Abdomen with mild tenderness, exam improved from admission  Tenderness is still in bilateral lower abdomen, without guarding or rigidity  Repeat LA is 0 9 (from 1 9)  Continue to monitor exam  Continue with fluid resuscitation  Will continue conservative management at this time      Kathryn Montague PA-C

## 2022-08-02 NOTE — UTILIZATION REVIEW
Initial Clinical Review    Admission: Date/Time/Statement:   Admission Orders (From admission, onward)     Ordered        08/01/22 2305  INPATIENT ADMISSION  Once                      Orders Placed This Encounter   Procedures    INPATIENT ADMISSION     Standing Status:   Standing     Number of Occurrences:   1     Order Specific Question:   Level of Care     Answer:   Med Surg [16]     Order Specific Question:   Estimated length of stay     Answer:   More than 2 Midnights     Order Specific Question:   Certification     Answer:   I certify that inpatient services are medically necessary for this patient for a duration of greater than two midnights  See H&P and MD Progress Notes for additional information about the patient's course of treatment  ED Arrival Information     Expected   -    Arrival   8/1/2022 20:27    Acuity   Urgent            Means of arrival   Ambulance    Escorted by   [facility]    Service   Hospitalist    Admission type   Urgent            Arrival complaint   abnormal labs           Chief Complaint   Patient presents with    Vomiting     +N/V/D starting 7/29  Pt had a colonoscopy 7/25, 7/26 had a chemo tx  Elevated kidney levels on OP bloodwork  Initial Presentation: 73 y o  male  Presents to ED via  EMS  From home  With nausea, vomiting,  Diarrhea and weakness for past 4 days  Denies  Blood in stool or emesis  PMH  Is  Uncontrolled DM,  COPD,  MO, multiple  Myeloma on chemo,  CKD  Stage 3,  CHF, chronic  Afib on coumadin, ambulatory dysfunction, S/P  BKA and GERD  Labs reveal severe metabolic acidosis, significant  DORA  CT scan shows acute diverticulitis, concern for  Ischemic bowel  Had  A colonoscopy  7/25 which showed mild diverticulosis, 2 polyps      Admit  Ip ICU LOC  With Acute diverticulitis, Hyperkalemia ,  DORA  And  High anion gap metabolic acidosis and plan is  IV protonix, monitor labs, nephrology consult, bicarb drip, IVF, stool studies/c/diff,  SAMANTHA and surgery consult  Date: 8/2         Day 2:   GI consult  Continue  SAMANTHA/IVF and bowel rest   Doubt malignancy given negative  Recent colonoscopy  Doubt colonic infarction given normal lactate  Progress note  Continue   NPO, SAMANTHA and IVF  No surgical intervention currently  Continue pain control and antiemetics  Hemoglobin   7 7  This am   Transfuse as  Indicated  Wait stool studies        ED Triage Vitals [08/01/22 2028]   Temperature Pulse Respirations Blood Pressure SpO2   97 9 °F (36 6 °C) 68 22 111/54 100 %      Temp Source Heart Rate Source Patient Position - Orthostatic VS BP Location FiO2 (%)   Temporal Monitor Sitting Left arm --      Pain Score       No Pain          Wt Readings from Last 1 Encounters:   08/02/22 128 kg (282 lb 6 6 oz)     Additional Vital Signs:   -- 79 -- 123/77 95 96 % -- --    08/02/22 0800 -- 77 19 109/54 72 96 % -- --   08/02/22 0740 98 4 °F (36 9 °C) 81 18 137/60 87 97 % None (Room air) Lying   08/02/22 0400 97 6 °F (36 4 °C) 75 28 Abnormal  127/60 84 99 % None (Room air) Lying   08/02/22 0300 -- 71 14 107/55 75 97 % -- --   08/02/22 0200 -- 74 12 134/64 92 97 % -- --   08/02/22 00:15:42 97 8 °F (36 6 °C) 75 -- 113/70 84 96 % -- --   08/01/22 2330 -- 70 17 -- -- 100 % -- --   08/01/22 2315 -- 71 17 -- -- 100 % -- --   08/01/22 2300 -- 72 18 -- 69 99 % -- --   08/01/22 2251 -- 74 22 130/58 84 100 % None (Room air) Lying   08/01/22 2240 -- 68 15 -- -- 99 % -- --   08/01/22 2225 -- 69 15 -- -- 99 % -- --   08/01/22 2210 -- 69 16 -- -- 99 % -- --   08/01/22 2155 -- 71 15 -- -- 99 % -- --   08/01/22 2140 -- 69 16 -- -- 99 % -- --   08/01/22 2125 -- 66 15 -- -- 100 % -- --   08/01/22 2115 -- -- -- -- -- -- None (Room air) --   08/01/22 2055 -- 71 20 -- -- 100 % -- --   08/01/22 2040 -- 69 21 111/54 78 98 % -- --   08/01/22 2028 97 9 °F (36 6 °C) 68 22 111/54 -- 100 % None (Room air) Sitting       Pertinent Labs/Diagnostic Test Results:   CT abdomen pelvis wo contrast   Final Result by Deysi Friedman MD (08/01 2230)      1  Acute sigmoid diverticulitis with intrahepatic and extrahepatic portal venous air, concerning for ischemic bowel  No pneumatosis is identified     2   Left basilar opacities concerning for pneumonia      I personally discussed impression 1 with FRANTZ Friend Sebastian on 8/1/2022 at 10:25 PM       Workstation performed: [id number]         XR chest portable    (Results Pending)         Results from last 7 days   Lab Units 08/02/22  0423 08/02/22 0109 08/01/22 2342 08/01/22 2050   WBC Thousand/uL 8 92 10 26*  --  10 44*   HEMOGLOBIN g/dL 7 5* 8 1*  --  9 0*   I STAT HEMOGLOBIN g/dl  --   --  7 5*  --    HEMATOCRIT % 24 4* 26 8*  --  29 4*   HEMATOCRIT, ISTAT %  --   --  22*  --    PLATELETS Thousands/uL 132* 146*  --  162   NEUTROS ABS Thousands/µL 7 50 8 24*  --   --          Results from last 7 days   Lab Units 08/02/22  0951 08/02/22 0423 08/02/22 0109 08/01/22 2342 08/01/22 2050   SODIUM mmol/L 145 144 143  --  143   POTASSIUM mmol/L 4 8 5 1 5 4*  --  5 5*   CHLORIDE mmol/L 116* 116* 117*  --  114*   CO2 mmol/L 14* 14* 14*  --  12*   CO2, I-STAT mmol/L  --   --   --  11*  --    ANION GAP mmol/L 15* 14* 12  --  17*   BUN mg/dL 91* 89* 92*  --  95*   CREATININE mg/dL 5 38* 5 32* 5 44*  --  5 78*   EGFR ml/min/1 73sq m 9 10 9  --  9   CALCIUM mg/dL 8 2* 8 2* 8 0*  --  8 8   CALCIUM, IONIZED, ISTAT mmol/L  --   --   --  1 28  --    MAGNESIUM mg/dL  --  1 9 1 7  --   --    PHOSPHORUS mg/dL  --  5 1* 5 5*  --   --      Results from last 7 days   Lab Units 08/02/22  0951 08/02/22  0423 08/02/22  0109 08/01/22  2050   AST U/L 18 14 13 15   ALT U/L 21 21 17 23   ALK PHOS U/L 100 101 101 109   TOTAL PROTEIN g/dL 5 5* 5 4* 5 9* 6 7   ALBUMIN g/dL 2 5* 2 5* 2 7* 3 1*   TOTAL BILIRUBIN mg/dL 0 60 0 50 0 50 0 50     Results from last 7 days   Lab Units 08/02/22  0144 08/01/22  2033   POC GLUCOSE mg/dl 70 93     Results from last 7 days   Lab Units 08/02/22  0951 08/02/22  0423 08/02/22  0109 08/01/22 2050   GLUCOSE RANDOM mg/dL 88 85 64* 91               Results from last 7 days   Lab Units 08/01/22  2342   PH, YOLA I-STAT  7 156*   PCO2, YOLA ISTAT mm HG 28 4*   PO2, YOLA ISTAT mm HG 38 0   HCO3, YOLA ISTAT mmol/L 10 0*   I STAT BASE EXC mmol/L -17*   I STAT O2 SAT % 58*                 Results from last 7 days   Lab Units 08/02/22  0423 08/02/22  0033   PROTIME seconds 28 8* 27 5*   INR  2 56* 2 41*   PTT seconds  --  53*         Results from last 7 days   Lab Units 08/02/22  0423   PROCALCITONIN ng/ml 0 94*     Results from last 7 days   Lab Units 08/02/22  0423 08/01/22  2341 08/01/22 2050   LACTIC ACID mmol/L 0 9 0 9 1 9                         Results from last 7 days   Lab Units 08/02/22  0109 08/01/22 2050   LIPASE u/L 338 443*             ED Treatment:   Medication Administration from 08/01/2022 2027 to 08/01/2022 2352       Date/Time Order Dose Route Action Comments     08/01/2022 2059 ondansetron (ZOFRAN) injection 4 mg 4 mg Intravenous Given      08/01/2022 2100 HYDROmorphone (DILAUDID) injection 0 5 mg 0 5 mg Intravenous Given      08/01/2022 2212 sodium chloride 0 9 % bolus 1,000 mL 0 mL Intravenous Stopped      08/01/2022 2058 sodium chloride 0 9 % bolus 1,000 mL 1,000 mL Intravenous New Bag      08/01/2022 2341 sodium chloride 0 9 % bolus 1,000 mL 0 mL Intravenous Stopped      08/01/2022 2249 sodium chloride 0 9 % bolus 1,000 mL 1,000 mL Intravenous New Bag      08/01/2022 2250 piperacillin-tazobactam (ZOSYN) IVPB 3 375 g 3 375 g Intravenous New Bag         Present on Admission:   Acute diverticulitis   [facility]   Ambulatory dysfunction   Chronic atrial fibrillation (HCC)   Chronic diastolic (congestive) heart failure (HCC)   Chronic obstructive pulmonary disease, unspecified (HCC)   GERD (gastroesophageal reflux disease)   Multiple myeloma ([address] )   Morbid obesity ([facility] )   NALLELY (obstructive sleep apnea)   Stage 3b chronic kidney disease [facility])      Admitting Diagnosis: Vomiting [R11 10]  DORA (acute kidney injury) ([facility] ) [N17 9]  Acute diverticulitis [K57 92]  Age/Sex: 73 y o  male  Admission Orders:  Scheduled Medications:  fluticasone, 1 spray, Each Nare, BID  fluticasone-vilanterol, 1 puff, Inhalation, Daily  pantoprazole, 40 mg, Intravenous, Q24H JULIEN  piperacillin-tazobactam, 2 25 g, Intravenous, Q6H      Continuous IV Infusions:  multi-electrolyte, 75 mL/hr, Intravenous, Continuous  sodium bicarbonate infusion, 150 mL/hr, Intravenous, Continuous      PRN Meds:  acetaminophen, 650 mg, Oral, Q6H PRN  ondansetron, 4 mg, Intravenous, Q6H PRN        IP CONSULT TO ACUTE CARE SURGERY  IP CONSULT TO GASTROENTEROLOGY  IP CONSULT TO CASE MANAGEMENT  IP CONSULT TO NEPHROLOGY    Network Utilization Review Department  ATTENTION: Please call with any questions or concerns to [phone] and carefully listen to the prompts so that you are directed to the right person  All voicemails are confidential   Candia Siemens all requests for admission clinical reviews, approved or denied determinations and any other requests to dedicated fax number below belonging to the campus where the patient is receiving treatment   List of dedicated fax numbers for the Facilities:  [address] DENIALS (Administrative/Medical Necessity) [phone]   [address] (Maternity/NICU/Pediatrics) [phone]   [address] [phone]   [address]  [address] [facility] [place] Kiko [place] [address] [id number] [address] [place] 1481 [phone]   [address] [facility] 951 [phone]

## 2022-08-02 NOTE — ED PROVIDER NOTES
History  Chief Complaint   Patient presents with    Vomiting     +N/V/D starting 7/29  Pt had a colonoscopy 7/25, 7/26 had a chemo tx  Elevated kidney levels on OP bloodwork  73year old male presents for evaluation of nausea, vomiting and diarrhea which began 3 days ago with multiple episodes of nonbloody, nonbilious emesis as well as loose stools daily  He reports generalized pressure-like abdominal pain which improves slightly after bowel activity  Patient had labs performed at his nursing home facility with significant increase in his creatine (1 8 on 7/25 increased to 5 1 today on labs drawn on 1231)  He has history of multiple myeloma with last chemotherapy infusion (Velcade and Decadron) on 7/26/22  Colonoscopy 7/25/22:  · One 15 mm flat polyp in the cecum; completely removed en bloc by hot snare and retrieved specimen; placed 2 clips successfully (clips are MRI conditional); hemostasis achieved  · One 10 mm flat polyp in the sigmoid colon; completely removed en bloc by hot snare and retrieved specimen; placed 1 clip successfully (clip is MRI conditional)  · Mild diverticula in the descending colon and sigmoid colon  · Small, internal hemorrhoids         History provided by:  Patient  Vomiting  Severity:  Severe  Duration:  4 days  Timing:  Constant  Number of daily episodes:  4+  Quality:  Stomach contents  Progression:  Unchanged  Chronicity:  New  Recent urination:  Decreased  Relieved by:  Nothing  Exacerbated by: PO intake  Ineffective treatments:  None tried  Associated symptoms: abdominal pain and diarrhea    Associated symptoms: no cough, no fever, no headaches, no myalgias and no sore throat    Risk factors comment:  Multiple myeloma, recent colonoscopy      Prior to Admission Medications   Prescriptions Last Dose Informant Patient Reported? Taking?    Alcohol Swabs (ALCOHOL PREP) 70 % PADS  Outside Facility (Specify) Yes No   BD AUTOSHIELD DUO 30G X 5 MM MISC  Outside Facility (Specify) No No   Sig: USE AS DIRECTED WITH INSULIN FOUR TIMES A DAY   BD INSULIN SYRINGE U/F 31G X 5/16" 0 5 ML MIS  Outside Facility (Specify) Yes No   Sig: USE AS DIRECTED WITH NOVOLIN 70/30   BD PEN NEEDLE HILARIO U/F 32G X 4 MM MIS  Outside Facility (Specify) No No   Sig: by Other route 3 (three) times a day   Easy Touch Safety Lancets 28G MIS  Outside Facility (Specify) No No   Sig: USE 4 TIMES DAILY TO TEST BLOOD SUGAR   Melatonin 5 MG TABS  Outside Facility (Specify) No No   Sig: TAKE ONE TABLET BY MOUTH EVERY NIGHT AT BEDTIME   NOVOLOG FLEXPEN 100 units/mL SOPN  Outside Facility (Specify) No No   Sig: INJ 5U BEFORE MEALS AND PER SS <250=NO CALL 250-300=5U,301-350= 10U,351-400=15U,401-450=20U>451 CALL FOR ORDERS UP TO 4X PER DAY   acetaminophen (TYLENOL) 500 mg tablet  Outside Facility (Specify) No No   Sig: Take 1 tablet (500 mg total) by mouth every 6 (six) hours as needed for mild pain, headaches or fever   albuterol (PROVENTIL HFA,VENTOLIN HFA) 90 mcg/act inhaler  Outside Facility (Specify) No No   Sig: Inhale 2 puffs every 6 (six) hours as needed for wheezing   allopurinol (ZYLOPRIM) 300 mg tablet   No No   Sig: TAKE ONE TABLET BY MOUTH DAILY   Patient taking differently: 300 mg   ammonium lactate (LAC-HYDRIN) 12 % lotion  Outside Facility (Specify) No No   Sig: APPLY TO BLE TOPICALLY DAILY   atorvastatin (LIPITOR) 40 mg tablet  Outside Facility (Specify) No No   Sig: Take 1 tablet (40 mg total) by mouth daily after dinner   bisacodyl (DULCOLAX) 10 mg suppository   Yes No   Sig: Insert 10 mg into the rectum as needed for constipation   Patient not taking: No sig reported   bisacodyl (DULCOLAX) 5 mg EC tablet  Outside Facility (Specify) Yes No   Sig: Take 5 mg by mouth 2 (two) times a day   bisacodyl (DULCOLAX) 5 mg EC tablet   No No   Sig: Please follow prep instructions provided by the office     Patient not taking: No sig reported   bortezomib (VELCADE)   Yes No   Sig: Infuse into a venous catheter once     Patient not taking: No sig reported   clotrimazole (LOTRIMIN) 1 % cream   No No   Sig: APPLY TO BUTTOCK 2 TIMES DAILY   Patient not taking: No sig reported   colchicine (COLCRYS) 0 6 mg tablet   No No   Sig: Take 1 tablet (0 6 mg total) by mouth daily   Patient not taking: No sig reported   docusate sodium (COLACE) 100 mg capsule  Outside Facility (Specify) No No   Sig: Take 1 capsule (100 mg total) by mouth 2 (two) times a day   fluticasone (FLONASE) 50 mcg/act nasal spray  Outside Facility (Specify) Yes No   fluticasone-salmeterol (ADVAIR DISKUS, WIXELA INHUB) 250-50 mcg/dose inhaler  Outside Facility (Specify) No No   Sig: Inhale 1 puff 2 (two) times a day Rinse mouth after use     insulin glargine (Lantus SoloStar) 100 units/mL injection pen  Outside Facility (Specify) No No   Sig: Inject 22 Units under the skin daily   liraglutide (Victoza) injection  Outside Facility (Specify) Yes No   Sig: Inject 1 8 mg under the skin daily   loperamide (IMODIUM) 2 mg capsule  Outside Facility (Specify) Yes No   Sig: Take 2 mg by mouth 2 (two) times a day as needed for diarrhea   magnesium hydroxide (MILK OF MAGNESIA) 400 mg/5 mL oral suspension  Outside Facility (Specify) Yes No   Sig: Take 30 mL by mouth daily as needed for constipation   metFORMIN (GLUCOPHAGE) 1000 MG tablet   No No   Sig: TAKE ONE TABLET BY MOUTH TWO TIMES A DAY   Patient taking differently: Take 1,000 mg by mouth 2 (two) times a day with meals   metoprolol succinate (TOPROL-XL) 25 mg 24 hr tablet  Outside Facility (Specify) No No   Sig: Take 1 tablet (25 mg total) by mouth daily   multivitamin-minerals therapeutic (THERA-M)   Yes No   nystatin (MYCOSTATIN) powder  Outside Facility (Specify) Yes No   Sig: Apply 102,376 application topically 4 (four) times a day   omeprazole (PriLOSEC) 40 MG capsule  Outside Facility (Specify) No No   Sig: Take 1 capsule (40 mg total) by mouth daily Half an hour prior to breakfast   ondansetron (ZOFRAN) 4 mg tablet  Outside Facility (Specify) Yes No   Sig: ondansetron HCl 4 mg tablet   TAKE 1 TABLET BY MOUTH EVERY 8 HOURS AS NEEDED   polyethylene glycol (GOLYTELY) 4000 mL solution   No No   Sig: Take 4,000 mL by mouth once for 1 dose   Patient not taking: Reported on 7/18/2022   polyethylene glycol (GOLYTELY) 4000 mL solution   No No   Sig: Please follow prep instructions provided by the office   Patient taking differently: 17 g Please follow prep instructions provided by the office   polyethylene glycol (MIRALAX) 17 g packet  Outside Facility (Specify) No No   Sig: Take 17 g by mouth daily   potassium chloride (K-DUR,KLOR-CON) 20 mEq tablet  Outside Facility (Specify) No No   Sig: Take 2 tablets (40 mEq total) by mouth daily   torsemide (DEMADEX) 20 mg tablet  Outside Facility (Specify) No No   Sig: Take 2 tablets (40 mg total) by mouth 2 (two) times a day   warfarin (COUMADIN) 1 mg tablet   Yes No   Sig: Take 1 mg by mouth daily at bedtime 2 mg on Wed and Sun   warfarin (COUMADIN) 6 mg tablet  Outside Facility (Specify) Yes No      Facility-Administered Medications: None       Past Medical History:   Diagnosis Date    Cancer ([facility] )     CHF (congestive heart failure) (HCC)     Chronic kidney disease     COPD (chronic obstructive pulmonary disease) ([facility] )     COVID-19     Decubitus ulcer of heel     LAST ASSESSED [id number]    Diabetes mellitus ([facility] )     History of varicose veins     Hypertension     Intermittent claudication (HCC)     Neuropathy     Seasonal allergies        Past Surgical History:   Procedure Laterality Date    AMPUTATION ABOVE KNEE (AKA) Left 7/31/2019    Procedure: AMPUTATION ABOVE KNEE (AKA);   Surgeon: Jesse Baker MD;  Location:  MAIN OR;  Service: General    ANGIOPLASTY      W/ FLUOROSC ANGIOGRAPGY PERIPHERAL ARTERY ADDITIONAL  LAST ASSESSED [id number]    ANGIOPLASTY / STENTING FEMORAL      TANSCATH INTRAVASCULAR STENT PLACEMENT PERCUTANEOUS FEMORAL     COLONOSCOPY  2010    CT BONE MARROW BIOPSY AND ASPIRATION  8/9/2019    FULL THICKNESS SKIN GRAFT      TENDON REPAIR      TOE AMPUTATION Left 12/27/2018    Procedure: AMPUTATION left 4th TOE;  Surgeon: Karina Ferraro DPM;  Location:  MAIN OR;  Service: Podiatry       Family History   Problem Relation Age of Onset    Other Mother         CARDIAC DISORDER     Diabetes Mother     Cancer Father     Other Family         CARDIAC DISORDER     Diabetes Family     Cancer Family     Mental illness Family     Kidney disease Sister     Diabetes Sister      I have reviewed and agree with the history as documented  E-Cigarette/Vaping    E-Cigarette Use Never User      E-Cigarette/Vaping Substances    Nicotine No     THC No     CBD No     Flavoring No     Other No     Unknown No      Social History     Tobacco Use    Smoking status: Never Smoker    Smokeless tobacco: Never Used   Vaping Use    Vaping Use: Never used   Substance Use Topics    Alcohol use: Not Currently    Drug use: Not Currently       Review of Systems   Constitutional: Negative for appetite change, diaphoresis, fatigue and fever  HENT: Negative for congestion and sore throat  Respiratory: Negative for cough and shortness of breath  Cardiovascular: Negative for chest pain  Gastrointestinal: Positive for abdominal distention, abdominal pain, diarrhea, nausea and vomiting  Negative for constipation  Musculoskeletal: Negative for myalgias  Skin: Negative for rash and wound  Neurological: Negative for dizziness, syncope and headaches  All other systems reviewed and are negative  Physical Exam  Physical Exam  Vitals and nursing note reviewed  Constitutional:       General: He is not in acute distress  Appearance: He is well-developed  He is not toxic-appearing or diaphoretic  HENT:      Head: Normocephalic and atraumatic        Right Ear: External ear normal       Left Ear: External ear normal       Nose: Nose normal    Eyes:      General: No scleral icterus  Cardiovascular:      Rate and Rhythm: Normal rate and regular rhythm  Heart sounds: Normal heart sounds  Pulmonary:      Effort: Pulmonary effort is normal  No respiratory distress  Breath sounds: Normal breath sounds  Abdominal:      General: There is distension  Palpations: Abdomen is soft  Tenderness: There is generalized abdominal tenderness  There is no guarding or rebound  Musculoskeletal:         General: No deformity  Normal range of motion  Comments: Left AKA   Skin:     Findings: No rash  Neurological:      General: No focal deficit present  Mental Status: He is alert and oriented to person, place, and time     Psychiatric:         Mood and Affect: Mood normal          Vital Signs  ED Triage Vitals [08/01/22 2028]   Temperature Pulse Respirations Blood Pressure SpO2   97 9 °F (36 6 °C) 68 22 111/54 100 %      Temp Source Heart Rate Source Patient Position - Orthostatic VS BP Location FiO2 (%)   Temporal Monitor Sitting Left arm --      Pain Score       No Pain           Vitals:    08/01/22 2300 08/01/22 2315 08/01/22 2330 08/02/22 0015   BP:    113/70   Pulse: 72 71 70 75   Patient Position - Orthostatic VS:             Visual Acuity      ED Medications  Medications   piperacillin-tazobactam (ZOSYN) 2 25 g in sodium chloride 0 9 % 50 mL IVPB (has no administration in time range)   sodium bicarbonate 150 mEq in dextrose 5 % 1,000 mL infusion (has no administration in time range)   sodium bicarbonate 8 4 % injection 50 mEq (has no administration in time range)   sodium bicarbonate 8 4 % injection 50 mEq (has no administration in time range)   ondansetron (ZOFRAN) injection 4 mg (4 mg Intravenous Given 8/1/22 2059)   HYDROmorphone (DILAUDID) injection 0 5 mg (0 5 mg Intravenous Given 8/1/22 2100)   sodium chloride 0 9 % bolus 1,000 mL (0 mL Intravenous Stopped 8/1/22 2212)   sodium chloride 0 9 % bolus 1,000 mL (0 mL Intravenous Stopped 8/1/22 2341) piperacillin-tazobactam (ZOSYN) IVPB 3 375 g (3 375 g Intravenous New Bag 8/1/22 2250)   multi-electrolyte (PLASMALYTE-A/ISOLYTE-S PH 7 4) IV solution 1,000 mL (1,000 mL Intravenous New Bag 8/2/22 0112)       Diagnostic Studies  Results Reviewed     Procedure Component Value Units Date/Time    APTT [[id number]]  (Abnormal) Collected: 08/02/22 0033    Lab Status: Final result Specimen: Blood from Arm, Left Updated: 08/02/22 0106     PTT 53 seconds     Protime-INR [[id number]]  (Abnormal) Collected: 08/02/22 0033    Lab Status: Final result Specimen: Blood from Arm, Left Updated: 08/02/22 0106     Protime 27 5 seconds      INR 2 41    Lactic acid, plasma [[id number]]  (Normal) Collected: 08/01/22 2341    Lab Status: Final result Specimen: Blood from Arm, Right Updated: 08/02/22 0017     LACTIC ACID 0 9 mmol/L     Narrative:      Result may be elevated if tourniquet was used during collection      POCT Blood Gas (CG8+) [[id number]]  (Abnormal) Collected: 08/01/22 2342    Lab Status: Final result Specimen: Venous Updated: 08/01/22 2347     ph, Volodymyr ISTAT 7 156     pCO2, Volodymyr i-STAT 28 4 mm HG      pO2, Volodymyr i-STAT 38 0 mm HG      BE, i-STAT -17 mmol/L      HCO3, Volodymyr i-STAT 10 0 mmol/L      CO2, i-STAT 11 mmol/L      O2 Sat, i-STAT 58 %      SODIUM, I-STAT 145 mmol/l      Potassium, i-STAT 5 3 mmol/L      Calcium, Ionized i-STAT 1 28 mmol/L      Hct, i-STAT 22 %      Hgb, i-STAT 7 5 g/dl      Glucose, i-STAT 64 mg/dl      Specimen Type VENOUS    Blood gas, venous [[id number]] Collected: 08/01/22 2342    Lab Status: No result Specimen: Blood from Arm, Right     Stool Enteric Bacterial Panel by PCR [[id number]]     Lab Status: No result Specimen: Stool from Per Rectum     Clostridium difficile toxin by PCR with EIA [[id number]]     Lab Status: No result Specimen: Stool from Per Rectum     Manual Differential(PHLEBS Do Not Order) [[id number]]  (Abnormal) Collected: 08/01/22 2050    Lab Status: Final result Specimen: Blood from Line, Venous Updated: 08/01/22 2226     Segmented % 81 %      Lymphocytes % 17 %      Monocytes % 1 %      Eosinophils, % 0 %      Basophils % 0 %      Metamyelocytes% 1 %      Absolute Neutrophils 8 46 Thousand/uL      Lymphocytes Absolute 1 77 Thousand/uL      Monocytes Absolute 0 10 Thousand/uL      Eosinophils Absolute 0 00 Thousand/uL      Basophils Absolute 0 00 Thousand/uL      Total Counted --     nRBC 1 /100 WBC      Helmet Cells Present     Microcytes Present     Spherocytes Present     Platelet Estimate Adequate    CBC and differential [[id number]]  (Abnormal) Collected: 08/01/22 2050    Lab Status: Final result Specimen: Blood from Line, Venous Updated: 08/01/22 2226     WBC 10 44 Thousand/uL      RBC 3 40 Million/uL      Hemoglobin 9 0 g/dL      Hematocrit 29 4 %      MCV 87 fL      MCH 26 5 pg      MCHC 30 6 g/dL      RDW 19 7 %      MPV 11 7 fL      Platelets 601 Thousands/uL     Narrative: This is an appended report  These results have been appended to a previously verified report      Comprehensive metabolic panel [[id number]]  (Abnormal) Collected: 08/01/22 2050    Lab Status: Final result Specimen: Blood from Line, Venous Updated: 08/01/22 2157     Sodium 143 mmol/L      Potassium 5 5 mmol/L      Chloride 114 mmol/L      CO2 12 mmol/L      ANION GAP 17 mmol/L      BUN 95 mg/dL      Creatinine 5 78 mg/dL      Glucose 91 mg/dL      Calcium 8 8 mg/dL      Corrected Calcium 9 5 mg/dL      AST 15 U/L      ALT 23 U/L      Alkaline Phosphatase 109 U/L      Total Protein 6 7 g/dL      Albumin 3 1 g/dL      Total Bilirubin 0 50 mg/dL      eGFR 9 ml/min/1 73sq m     Narrative:      [place] guidelines for Chronic Kidney Disease (CKD):     Stage 1 with normal or high GFR (GFR > 90 mL/min/1 73 square meters)    Stage 2 Mild CKD (GFR = 60-89 mL/min/1 73 square meters)    Stage 3A Moderate CKD (GFR = 45-59 mL/min/1 73 square meters)    Stage 3B Moderate CKD (GFR = 30-44 mL/min/1 73 square meters)    Stage 4 Severe CKD (GFR = 15-29 mL/min/1 73 square meters)    Stage 5 End Stage CKD (GFR <15 mL/min/1 73 square meters)  Note: GFR calculation is accurate only with a steady state creatinine    Lipase [[id number]]  (Abnormal) Collected: 08/01/22 2050    Lab Status: Final result Specimen: Blood from Line, Venous Updated: 08/01/22 2157     Lipase 443 u/L     Lactic acid [[id number]]  (Normal) Collected: 08/01/22 2050    Lab Status: Final result Specimen: Blood from Line, Venous Updated: 08/01/22 2126     LACTIC ACID 1 9 mmol/L     Narrative:      Result may be elevated if tourniquet was used during collection  Fingerstick Glucose (POCT) [[id number]]  (Normal) Collected: 08/01/22 2033    Lab Status: Final result Updated: 08/01/22 2034     POC Glucose 93 mg/dl                  CT abdomen pelvis wo contrast   Final Result by Paulino Briseno MD (08/01 2230)      1  Acute sigmoid diverticulitis with intrahepatic and extrahepatic portal venous air, concerning for ischemic bowel  No pneumatosis is identified     2   Left basilar opacities concerning for pneumonia      I personally discussed impression 1 with FRANTZ Woody on 8/1/2022 at 10:25 PM       Workstation performed: [id number]                    Procedures  ECG 12 Lead Documentation Only    Date/Time: 8/1/2022 9:12 PM  Performed by: Lila Forbes MD  Authorized by: Lila Forbes MD     Indications / Diagnosis:  Vomiting  ECG reviewed by me, the ED Provider: yes    Patient location:  ED  Previous ECG:     Previous ECG:  Compared to current    Comparison ECG info:  10/4/21 afib    Similarity:  No change  Interpretation:     Interpretation: abnormal    Rate:     ECG rate:  70    ECG rate assessment: normal    Rhythm:     Rhythm: atrial fibrillation    Ectopy:     Ectopy: none    QRS:     QRS axis:  Normal    QRS intervals:  Normal  Conduction:     Conduction: normal    ST segments:     ST segments:  Normal  T waves: T waves: flattening      Flattening:  AVL             ED Course  ED Course as of 08/02/22 0118   Mon Aug 01, 2022   2106 Hemoglobin(!): 9 0  11 1 four months ago   2201 Creatinine(!): 5 78  1/ 8 on 7/25/22 on outside labs  2 32 four months ago   2202 Potassium(!): 5 5                            Initial Sepsis Screening     Row Name 08/01/22 2309                Is the patient's history suggestive of a new or worsening infection? Yes (Proceed)  -EE        Suspected source of infection acute abdominal infection  -EE        Are two or more of the following signs & symptoms of infection both present and new to the patient? No  -EE        Indicate SIRS criteria Tachypnea > 20 resp per min  -EE        If the answer is yes to both questions, suspicion of sepsis is present --        If severe sepsis is present AND tissue hypoperfusion perists in the hour after fluid resuscitation or lactate > 4, the patient meets criteria for SEPTIC SHOCK --        Are any of the following organ dysfunction criteria present within 6 hours of suspected infection and SIRS criteria that are NOT considered to be chronic conditions?  --        Organ dysfunction --        Date of presentation of severe sepsis --        Time of presentation of severe sepsis --        Tissue hypoperfusion persists in the hour after crystalloid fluid administration, evidenced, by either: --        Was hypotension present within one hour of the conclusion of crystalloid fluid administration? --        Date of presentation of septic shock --        Time of presentation of septic shock --              User Key  (r) = Recorded By, (t) = Taken By, (c) = Cosigned By    [address] Name Provider Type    EE Carmie Officer, MD Physician                              MDM  Number of Diagnoses or Management Options  Acute diverticulitis: new and requires workup  DORA (acute kidney injury) [facility]): new and requires workup  Diagnosis management comments: 73year old male presents for evaluation of abdominal pain, nausea, vomiting and diarrhea x 4 days  Significant DORA on outpatient labs worsening on labs today  IV hydration  CT concerning for acute diverticulitis with possible ischemia with finding of portal venous air  Zosyn ordered  Patient evaluated by general surgery in the ED and admitted for further evaluation and management  Disposition  Final diagnoses:   Acute diverticulitis   DORA (acute kidney injury) ([facility] )     Time reflects when diagnosis was documented in both MDM as applicable and the Disposition within this note     Time User Action Codes Description Comment    8/1/2022 10:25 PM Song Maritza Add [K57 92] Acute diverticulitis     8/1/2022 10:40 PM Song Maritza Add [N17 9] DORA (acute kidney injury) ([facility] )     8/1/2022 10:41 PM Song Maritza Add [I87 9] Disorder of portal venous system     8/1/2022 10:41 PM Song Maritza Remove [I87 9] Disorder of portal venous system       ED Disposition     ED Disposition   Admit    Condition   Stable    Date/Time   Mon Aug 1, 2022 10:42 PM    Comment   Case was discussed with MARGUERITE and the patient's admission status was agreed to be Admission Status: inpatient status to the service of Dr Mariela Holman   Follow-up Information    None         Current Discharge Medication List      CONTINUE these medications which have NOT CHANGED    Details   acetaminophen (TYLENOL) 500 mg tablet Take 1 tablet (500 mg total) by mouth every 6 (six) hours as needed for mild pain, headaches or fever  Qty: 90 tablet, Refills: 1    Associated Diagnoses: Diabetic polyneuropathy associated with type 2 diabetes mellitus (HCC)      albuterol (PROVENTIL HFA,VENTOLIN HFA) 90 mcg/act inhaler Inhale 2 puffs every 6 (six) hours as needed for wheezing  Qty: 1 Inhaler, Refills: 0    Comments: Substitution to a formulary equivalent within the same pharmaceutical class is authorized  Associated Diagnoses:  Moderate persistent asthma without complication      Alcohol Swabs (ALCOHOL PREP) 70 % PADS Refills: 0      allopurinol (ZYLOPRIM) 300 mg tablet TAKE ONE TABLET BY MOUTH DAILY  Qty: 30 tablet, Refills: 5    Associated Diagnoses: Chronic gout without tophus, unspecified cause, unspecified site      ammonium lactate (LAC-HYDRIN) 12 % lotion APPLY TO BLE TOPICALLY DAILY  Qty: 400 g, Refills: 2    Associated Diagnoses: Venous stasis dermatitis, unspecified laterality      atorvastatin (LIPITOR) 40 mg tablet Take 1 tablet (40 mg total) by mouth daily after dinner  Qty: 30 tablet, Refills: 0    Associated Diagnoses: COVID-19 virus infection      !! BD AUTOSHIELD DUO 30G X 5 MM MISC USE AS DIRECTED WITH INSULIN FOUR TIMES A DAY  Qty: 100 each, Refills: 3    Associated Diagnoses: Uncontrolled type 2 diabetes mellitus with hypoglycemia and coma (HCC)      BD INSULIN SYRINGE U/F 31G X 5/16" 0 5 ML MISC USE AS DIRECTED WITH NOVOLIN 70/30  Refills: 0      !! BD PEN NEEDLE HILARIO U/F 32G X 4 MM MISC by Other route 3 (three) times a day  Qty: 300 each, Refills: 1    Associated Diagnoses: Uncontrolled type 2 diabetes mellitus with hyperglycemia (HCC)      bisacodyl (DULCOLAX) 10 mg suppository Insert 10 mg into the rectum as needed for constipation      !! bisacodyl (DULCOLAX) 5 mg EC tablet Take 5 mg by mouth 2 (two) times a day      !! bisacodyl (DULCOLAX) 5 mg EC tablet Please follow prep instructions provided by the office    Qty: 2 tablet, Refills: 0    Associated Diagnoses: Colon cancer screening      bortezomib (VELCADE) Infuse into a venous catheter once        clotrimazole (LOTRIMIN) 1 % cream APPLY TO BUTTOCK 2 TIMES DAILY  Qty: 45 g, Refills: 3    Associated Diagnoses: Left leg cellulitis      colchicine (COLCRYS) 0 6 mg tablet Take 1 tablet (0 6 mg total) by mouth daily  Qty: 2 tablet, Refills: 0    Associated Diagnoses: Gout of left wrist      docusate sodium (COLACE) 100 mg capsule Take 1 capsule (100 mg total) by mouth 2 (two) times a day  Refills: 0 Associated Diagnoses: Acute on chronic diastolic congestive heart failure (HCC)      Easy Touch Safety Lancets 28G MISC USE 4 TIMES DAILY TO TEST BLOOD SUGAR  Qty: 100 each, Refills: 5    Associated Diagnoses: Uncontrolled type 2 diabetes mellitus with hypoglycemia and coma (HCC)      fluticasone (FLONASE) 50 mcg/act nasal spray       fluticasone-salmeterol (ADVAIR DISKUS, WIXELA INHUB) 250-50 mcg/dose inhaler Inhale 1 puff 2 (two) times a day Rinse mouth after use  Qty: 1 Inhaler, Refills: 5    Comments: Substitution to a formulary equivalent within the same pharmaceutical class is authorized  Associated Diagnoses:  Moderate persistent asthma without complication      insulin glargine (Lantus SoloStar) 100 units/mL injection pen Inject 22 Units under the skin daily  Refills: 0    Associated Diagnoses: Uncontrolled type 2 diabetes mellitus with hypoglycemia and coma (HCC)      liraglutide (Victoza) injection Inject 1 8 mg under the skin daily      loperamide (IMODIUM) 2 mg capsule Take 2 mg by mouth 2 (two) times a day as needed for diarrhea      magnesium hydroxide (MILK OF MAGNESIA) 400 mg/5 mL oral suspension Take 30 mL by mouth daily as needed for constipation      Melatonin 5 MG TABS TAKE ONE TABLET BY MOUTH EVERY NIGHT AT BEDTIME  Qty: 30 tablet, Refills: 2    Associated Diagnoses: Insomnia, unspecified type      metFORMIN (GLUCOPHAGE) 1000 MG tablet TAKE ONE TABLET BY MOUTH TWO TIMES A DAY  Qty: 60 tablet, Refills: 2    Associated Diagnoses: Uncontrolled type 2 diabetes mellitus with hypoglycemia and coma (HCC)      metoprolol succinate (TOPROL-XL) 25 mg 24 hr tablet Take 1 tablet (25 mg total) by mouth daily  Refills: 0    Associated Diagnoses: Cardiomyopathy, unspecified type (HCC)      multivitamin-minerals therapeutic (THERA-M)       NOVOLOG FLEXPEN 100 units/mL SOPN INJ 5U BEFORE MEALS AND PER SS <250=NO CALL 250-300=5U,301-350= 10U,351-400=15U,401-450=20U>451 CALL FOR ORDERS UP TO 4X PER DAY  Qty: 15 mL, Refills: 5    Associated Diagnoses: Uncontrolled type 2 diabetes mellitus with hyperglycemia (HCC)      nystatin (MYCOSTATIN) powder Apply 685,300 application topically 4 (four) times a day      omeprazole (PriLOSEC) 40 MG capsule Take 1 capsule (40 mg total) by mouth daily Half an hour prior to breakfast  Qty: 30 capsule, Refills: 2    Associated Diagnoses: Upper abdominal pain      ondansetron (ZOFRAN) 4 mg tablet ondansetron HCl 4 mg tablet   TAKE 1 TABLET BY MOUTH EVERY 8 HOURS AS NEEDED      polyethylene glycol (GOLYTELY) 4000 mL solution Please follow prep instructions provided by the office  Qty: 4000 mL, Refills: 0    Associated Diagnoses: Colon cancer screening      polyethylene glycol (MIRALAX) 17 g packet Take 17 g by mouth daily  Refills: 0    Associated Diagnoses: Acute on chronic diastolic congestive heart failure (HCC)      potassium chloride (K-DUR,KLOR-CON) 20 mEq tablet Take 2 tablets (40 mEq total) by mouth daily  Refills: 0    Associated Diagnoses: Acute on chronic diastolic congestive heart failure (HCC)      torsemide (DEMADEX) 20 mg tablet Take 2 tablets (40 mg total) by mouth 2 (two) times a day  Refills: 0    Associated Diagnoses: Acute on chronic diastolic congestive heart failure (HCC)      ! ! warfarin (COUMADIN) 1 mg tablet Take 1 mg by mouth daily at bedtime 2 mg on Wed and Sun      !! warfarin (COUMADIN) 6 mg tablet        !! - Potential duplicate medications found  Please discuss with provider  No discharge procedures on file      PDMP Review       Value Time User    PDMP Reviewed  Yes 11/5/2019  2:42 PM Alexander Kincaid MD          ED Provider  Electronically Signed by           Srinivas Mccray MD  08/01/22 6322       Srinivas Mccray MD  08/02/22 0774

## 2022-08-02 NOTE — ASSESSMENT & PLAN NOTE
Wt Readings from Last 3 Encounters:   08/01/22 132 kg (290 lb)   07/26/22 131 kg (288 lb 5 8 oz)   07/19/22 127 kg (281 lb 1 4 oz)     Continue to monitor closely for volume overload due to fluid resuscitation for diverticulitis  Keep O2 sats greater than 92%

## 2022-08-02 NOTE — ASSESSMENT & PLAN NOTE
Lab Results   Component Value Date    EGFR 9 08/02/2022    EGFR 9 08/01/2022    EGFR 27 03/10/2022    CREATININE 5 44 (H) 08/02/2022    CREATININE 5 78 (H) 08/01/2022    CREATININE 2 32 (H) 03/10/2022     Baseline creatinine is between 2 and 2 32  Creatinine arrival was 5 78 after resuscitation 5 44  Nephrology consult  Start bicarb drip for severe metabolic acidosis  Continue to monitor potassium

## 2022-08-02 NOTE — H&P
[place]  H&P- Umesh Blind 1950, 73 y o  male MRN: [id number]  Unit/Bed#: -01 SDU Encounter: [id number]  Primary Care Provider: Mckenzie Singh MD   Date and time admitted to hospital: 8/1/2022  8:27 PM    * Acute diverticulitis  Assessment & Plan  Patient is 44-year-old female with an extensive medical history who has nausea vomiting and diarrhea with decreased p o  Intake over the last 4 days  · CT scan - acute sigmoid diverticulitis with intrahepatic and extrahepatic portal venous air is standing for ischemic bowel no pneumatosis  · Surgery is consulted- plan for NPO IV fluids IV antibiotics monitor exam - if condition worsens may need intervention  · Received 2 L of IV bolus in the ED, repeat 1 L here  · Aggressive IV resuscitation  · Stool studies and C diff  · GI consult per surgery  · Colonoscopy in 7/25   · Continue to trend lactic and white count    Hyperkalemia  Assessment & Plan  Hyperkalemic treatment with IV fluid  Continue to monitor potassium every 8 hours  Arrival potassium was 5 5 repeat was 5 4    Stage 3b chronic kidney disease [facility])  Assessment & Plan  Lab Results   Component Value Date    EGFR 9 08/02/2022    EGFR 9 08/01/2022    EGFR 27 03/10/2022    CREATININE 5 44 (H) 08/02/2022    CREATININE 5 78 (H) 08/01/2022    CREATININE 2 32 (H) 03/10/2022     Baseline creatinine is between 2 and 2 32  Creatinine arrival was 5 78 after resuscitation 5 44  Nephrology consult  Start bicarb drip for severe metabolic acidosis  Continue to monitor potassium      Chronic atrial fibrillation ([facility] [address] )  Assessment & Plan  Continue Toprol when able  Patient is on Coumadin for AFib  INR is 2 5 repeat in the a m    Consideration of continuing Coumadin versus heparin drip depending on surgical interventions    Morbid obesity ([address] )  Assessment & Plan  BMI is 39 33  Lifestyle modification    Multiple myeloma (HCC)  Assessment & Plan  Last chemo infusion was on 07/26  Patient with diarrhea stool cultures and C difficile was ordered    Ambulatory dysfunction  Assessment & Plan  Due to lymphedema and BKA    Chronic diastolic (congestive) heart failure (HCC)  Assessment & Plan  Wt Readings from Last 3 Encounters:   08/01/22 132 kg (290 lb)   07/26/22 131 kg (288 lb 5 8 oz)   07/19/22 127 kg (281 lb 1 4 oz)     Continue to monitor closely for volume overload due to fluid resuscitation for diverticulitis  Keep O2 sats greater than 92%          Chronic obstructive pulmonary disease, unspecified ([facility] [address] )  Assessment & Plan  Continue Flonase and use Breo instead of Advair   Stable at this time    GERD (gastroesophageal reflux disease)  Assessment & Plan  Started on Protonix 40 mg IV Q 24 hours    NALLELY (obstructive sleep apnea)  Assessment & Plan  History of obstructive sleep apnea with noncompliance with cpap    Hyperphosphatemia  Assessment & Plan  Continue to monitor phosphorus levels  Most likely related to to renal disease and elevated creatinine    High anion gap metabolic acidosis  Assessment & Plan  Patient had an anion gap acidosis of 17 received 2 L of IV fluid  Most likely resolved after the    DORA (acute kidney injury) ([facility] [address] )  Assessment & Plan  Creatinine elevated at 5 78 after 2 L of IV fluids down to 5 38  Significant elevated BUN  Most likely due to diarrhea and vomiting  Continue to fluid resuscitate  Severe metabolic derangement started on a bicarb drip  Nephrology is consulted       -------------------------------------------------------------------------------------------------------------  Chief Complaint:  GI symptom    History of Present Illness   HX and PE limited by:   Kirstin Ginna is a 73 y o  male with a medical history of uncontrolled diabetes, morbid obesity, multiple myeloma on chemotherapy, COPD, CHF with an EF of 43%, chronic AFib on Coumadinm ambulatory dysfunction status post BKA, GERD who comes in with nausea vomiting and diarrhea     She has a severe metabolic acidosis with significant DORA baseline creatinine of 2 3 down to 578  He stated he had been his normal self until approximately 4 days ago very started with nausea and vomiting and diarrhea complaining of weakness  He denies any blood in his stool or blood in the vomit,   CT scan was performed by ER showed - acute sigmoid diverticulitis with intrahepatic and extrahepatic portal venous air concerning for ischemic bowel, continue to trend abdominal exam and lactic  Patient's last INR was 2 42 on Coumadin  He sees oncology outpatient for multiple myeloma and underwent chemotherapy infusion (Velcade and decadron) on 7/26  He underwent colonoscopy on 7/25 for which he had two flat polyps removed and revealed mild diverticulosis  History obtained from chart review and the patient   -------------------------------------------------------------------------------------------------------------  Dispo: Admit to Stepdown Level 2    Code Status: Level 1 - Full Code  --------------------------------------------------------------------------------------------------------------  Review of Systems   Constitutional: Positive for activity change and fatigue  Respiratory: Positive for cough and shortness of breath  Cardiovascular: Positive for leg swelling  Gastrointestinal: Positive for diarrhea, nausea and vomiting  Neurological: Positive for weakness  A 12-point, complete review of systems was reviewed and negative except as stated above     Physical Exam  Vitals and nursing note reviewed  Constitutional:       Appearance: He is well-developed  He is obese  He is ill-appearing  HENT:      Head: Normocephalic and atraumatic  Eyes:      Pupils: Pupils are equal, round, and reactive to light  Cardiovascular:      Rate and Rhythm: Normal rate and regular rhythm  Pulmonary:      Effort: Pulmonary effort is normal       Breath sounds: Normal breath sounds     Abdominal:      General: Bowel sounds are normal  There is no distension  Palpations: Abdomen is soft  Tenderness: There is abdominal tenderness  There is no guarding  Genitourinary:     Penis: Normal     Musculoskeletal:         General: Normal range of motion  Cervical back: Normal range of motion and neck supple  Right lower leg: Edema present  Left lower leg: Edema present  Skin:     General: Skin is warm and dry  Capillary Refill: Capillary refill takes less than 2 seconds  Findings: Erythema present  Neurological:      Mental Status: He is alert and oriented to person, place, and time  --------------------------------------------------------------------------------------------------------------  Vitals:   Vitals:    08/01/22 2300 08/01/22 2315 08/01/22 2330 08/02/22 0015   BP:    113/70   BP Location:       Pulse: 72 71 70 75   Resp: 18 17 17    Temp:    97 8 °F (36 6 °C)   TempSrc:       SpO2: 99% 100% 100% 96%   Weight:       Height:         Temp  Min: 97 8 °F (36 6 °C)  Max: 97 9 °F (36 6 °C)  IBW (Ideal Body Weight): 77 6 kg  Height: 6' (182 9 cm)  Body mass index is 39 33 kg/m²      Laboratory and Diagnostics:  Results from last 7 days   Lab Units 08/02/22 0109 08/01/22 2342 08/01/22 2050   WBC Thousand/uL 10 26*  --  10 44*   HEMOGLOBIN g/dL 8 1*  --  9 0*   I STAT HEMOGLOBIN g/dl  --  7 5*  --    HEMATOCRIT % 26 8*  --  29 4*   HEMATOCRIT, ISTAT %  --  22*  --    PLATELETS Thousands/uL 146*  --  162   NEUTROS PCT % 80*  --   --    MONOS PCT % 6  --   --    MONO PCT %  --   --  1*     Results from last 7 days   Lab Units 08/02/22 0109 08/01/22 2342 08/01/22 2050   SODIUM mmol/L 143  --  143   POTASSIUM mmol/L 5 4*  --  5 5*   CHLORIDE mmol/L 117*  --  114*   CO2 mmol/L 14*  --  12*   CO2, I-STAT mmol/L  --  11*  --    ANION GAP mmol/L 12  --  17*   BUN mg/dL 92*  --  95*   CREATININE mg/dL 5 44*  --  5 78*   CALCIUM mg/dL 8 0*  --  8 8   GLUCOSE RANDOM mg/dL 64*  --  91   ALT U/L 17 --  23   AST U/L 13  --  15   ALK PHOS U/L 101  --  109   ALBUMIN g/dL 2 7*  --  3 1*   TOTAL BILIRUBIN mg/dL 0 50  --  0 50     Results from last 7 days   Lab Units 08/02/22  0109   MAGNESIUM mg/dL 1 7   PHOSPHORUS mg/dL 5 5*      Results from last 7 days   Lab Units 08/02/22  0033   INR  2 41*   PTT seconds 53*          Results from last 7 days   Lab Units 08/01/22  2341 08/01/22  2050   LACTIC ACID mmol/L 0 9 1 9     ABG:    VBG:          Micro:        EKG: NSR  Imaging: I have personally reviewed pertinent reports  Historical Information   Past Medical History:   Diagnosis Date    Cancer [facility])     [facility]congestive [facility]     Chronic kidney disease     COPD (chronic obstructive pulmonary disease) ([facility] [address] )     COVID-19     Decubitus ulcer of heel     LAST ASSESSED [id number]    Diabetes mellitus (HCC)     History of varicose veins     Hypertension     Intermittent claudication (HCC)     Neuropathy     Seasonal allergies      Past Surgical History:   Procedure Laterality Date    AMPUTATION ABOVE KNEE (AKA) Left 7/31/2019    Procedure: AMPUTATION ABOVE KNEE (AKA);   Surgeon: Latesha Carranza MD;  Location:  MAIN OR;  Service: General    ANGIOPLASTY      W/ FLUOROSC ANGIOGRAPGY PERIPHERAL ARTERY ADDITIONAL  LAST ASSESSED [id number]    ANGIOPLASTY / STENTING FEMORAL      TANSCATH INTRAVASCULAR STENT PLACEMENT PERCUTANEOUS FEMORAL     COLONOSCOPY  2010    CT BONE MARROW BIOPSY AND ASPIRATION  8/9/2019    FULL THICKNESS SKIN GRAFT      TENDON REPAIR      TOE AMPUTATION Left 12/27/2018    Procedure: AMPUTATION left 4th TOE;  Surgeon: Kishan Alejandro DPM;  Location: [facility]IN [facility];  Service: Podiatry     Social History   Social History     Substance and Sexual Activity   Alcohol Use Not Currently     Social History     Substance and Sexual Activity   Drug Use Not Currently     Social History     Tobacco Use   Smoking Status Never Smoker   Smokeless Tobacco Never Used     Exercise History:   Family History:   Family History   Problem Relation Age of Onset    Other Mother         CARDIAC DISORDER     Diabetes Mother     Cancer Father     Other Family         CARDIAC DISORDER     Diabetes Family     Cancer Family     Mental illness Family     Kidney disease Sister     Diabetes Sister      I have reviewed this patient's family history and commented on sigificant items within the HPI      Medications:  Current Facility-Administered Medications   Medication Dose Route Frequency    fluticasone (FLONASE) 50 mcg/act nasal spray 1 spray  1 spray Each Nare BID    fluticasone-vilanterol (BREO ELLIPTA) 200-25 MCG/INH inhaler 1 puff  1 puff Inhalation Daily    multi-electrolyte (PLASMALYTE-A/ISOLYTE-S PH 7 4) IV solution  125 mL/hr Intravenous Continuous    pantoprazole (PROTONIX) injection 40 mg  40 mg Intravenous Q24H [address]    piperacillin-tazobactam (ZOSYN) 2 25 g in sodium chloride 0 9 % 50 mL IVPB  2 25 g Intravenous Q6H    sodium bicarbonate 150 mEq in dextrose 5 % 1,000 mL infusion  125 mL/hr Intravenous Continuous     Home medications:  Prior to Admission Medications   Prescriptions Last Dose Informant Patient Reported? Taking?    Alcohol Swabs (ALCOHOL PREP) 70 % PADS  Outside Facility (Specify) Yes No   BD AUTOSHIELD DUO 30G X 5 MM MISC  Outside Facility (Specify) No No   Sig: USE AS DIRECTED WITH INSULIN FOUR TIMES A DAY   BD INSULIN SYRINGE U/F 31G X 5/16" 0 5 ML MISC  Outside Facility (Specify) Yes No   Sig: USE AS DIRECTED WITH NOVOLIN 70/30   BD PEN NEEDLE HILARIO U/F 32G X 4 MM MISC  Outside Facility (Specify) No No   Sig: by Other route 3 (three) times a day   Easy Touch Safety Lancets 28G MISC  Outside Facility (Specify) No No   Sig: USE 4 TIMES DAILY TO TEST BLOOD SUGAR   Melatonin 5 MG TABS  Outside Facility (Specify) No No   Sig: TAKE ONE TABLET BY MOUTH EVERY NIGHT AT BEDTIME   NOVOLOG FLEXPEN 100 units/mL SOPN  Outside Facility (Specify) No No   Sig: INJ 5U BEFORE MEALS AND PER SS <250=NO CALL 250-300=5U,301-350= 10U,351-400=15U,401-450=20U>451 CALL FOR ORDERS UP TO 4X PER DAY   acetaminophen (TYLENOL) 500 mg tablet  Outside Facility (Specify) No No   Sig: Take 1 tablet (500 mg total) by mouth every 6 (six) hours as needed for mild pain, headaches or fever   albuterol (PROVENTIL HFA,VENTOLIN HFA) 90 mcg/act inhaler  Outside Facility (Specify) No No   Sig: Inhale 2 puffs every 6 (six) hours as needed for wheezing   allopurinol (ZYLOPRIM) 300 mg tablet   No No   Sig: TAKE ONE TABLET BY MOUTH DAILY   Patient taking differently: 300 mg   ammonium lactate (LAC-HYDRIN) 12 % lotion  Outside Facility (Specify) No No   Sig: APPLY TO BLE TOPICALLY DAILY   atorvastatin (LIPITOR) 40 mg tablet  Outside Facility (Specify) No No   Sig: Take 1 tablet (40 mg total) by mouth daily after dinner   bisacodyl (DULCOLAX) 10 mg suppository   Yes No   Sig: Insert 10 mg into the rectum as needed for constipation   Patient not taking: No sig reported   bisacodyl (DULCOLAX) 5 mg EC tablet  Outside Facility (Specify) Yes No   Sig: Take 5 mg by mouth 2 (two) times a day   bisacodyl (DULCOLAX) 5 mg EC tablet   No No   Sig: Please follow prep instructions provided by the office     Patient not taking: No sig reported   bortezomib (VELCADE)   Yes No   Sig: Infuse into a venous catheter once     Patient not taking: No sig reported   clotrimazole (LOTRIMIN) 1 % cream   No No   Sig: APPLY TO BUTTOCK 2 TIMES DAILY   Patient not taking: No sig reported   colchicine (COLCRYS) 0 6 mg tablet   No No   Sig: Take 1 tablet (0 6 mg total) by mouth daily   Patient not taking: No sig reported   docusate sodium (COLACE) 100 mg capsule  Outside Facility (Specify) No No   Sig: Take 1 capsule (100 mg total) by mouth 2 (two) times a day   fluticasone (FLONASE) 50 mcg/act nasal spray  Outside Facility (Specify) Yes No   fluticasone-salmeterol (ADVAIR DISKUS, WIXELA INHUB) 250-50 mcg/dose inhaler  Outside Facility (Specify) No No   Sig: Inhale 1 puff 2 (two) times a day Rinse mouth after use     insulin glargine (Lantus SoloStar) 100 units/mL injection pen  Outside Facility (Specify) No No   Sig: Inject 22 Units under the skin daily   liraglutide (Victoza) injection  Outside Facility (Specify) Yes No   Sig: Inject 1 8 mg under the skin daily   loperamide (IMODIUM) 2 mg capsule  Outside Facility (Specify) Yes No   Sig: Take 2 mg by mouth 2 (two) times a day as needed for diarrhea   magnesium hydroxide (MILK OF MAGNESIA) 400 mg/5 mL oral suspension  Outside Facility (Specify) Yes No   Sig: Take 30 mL by mouth daily as needed for constipation   metFORMIN (GLUCOPHAGE) 1000 MG tablet   No No   Sig: TAKE ONE TABLET BY MOUTH TWO TIMES A DAY   Patient taking differently: Take 1,000 mg by mouth 2 (two) times a day with meals   metoprolol succinate (TOPROL-XL) 25 mg 24 hr tablet  Outside Facility (Specify) No No   Sig: Take 1 tablet (25 mg total) by mouth daily   multivitamin-minerals therapeutic (THERA-M)   Yes No   nystatin (MYCOSTATIN) powder  Outside Facility (Specify) Yes No   Sig: Apply 502,263 application topically 4 (four) times a day   omeprazole (PriLOSEC) 40 MG capsule  Outside Facility (Specify) No No   Sig: Take 1 capsule (40 mg total) by mouth daily Half an hour prior to breakfast   ondansetron (ZOFRAN) 4 mg tablet  Outside Facility (Specify) Yes No   Sig: ondansetron HCl 4 mg tablet   TAKE 1 TABLET BY MOUTH EVERY 8 HOURS AS NEEDED   polyethylene glycol (GOLYTELY) 4000 mL solution   No No   Sig: Take 4,000 mL by mouth once for 1 dose   Patient not taking: Reported on 7/18/2022   polyethylene glycol (GOLYTELY) 4000 mL solution   No No   Sig: Please follow prep instructions provided by the office   Patient taking differently: 17 g Please follow prep instructions provided by the office   polyethylene glycol (MIRALAX) 17 g packet  Outside Facility (Specify) No No   Sig: Take 17 g by mouth daily   potassium chloride (K-DUR,KLOR-CON) 20 mEq tablet Outside Facility (Specify) No No   Sig: Take 2 tablets (40 mEq total) by mouth daily   torsemide (DEMADEX) 20 mg tablet  Outside Facility (Specify) No No   Sig: Take 2 tablets (40 mg total) by mouth 2 (two) times a day   warfarin (COUMADIN) 1 mg tablet   Yes No   Sig: Take 1 mg by mouth daily at bedtime 2 mg on Wed and Sun   warfarin (COUMADIN) 6 mg tablet  Outside Facility (Specify) Yes No      Facility-Administered Medications: None     Allergies: Allergies   Allergen Reactions    Latex Rash       ------------------------------------------------------------------------------------------------------------  Advance Directive and Living Will:      Power of :    POLST:    ------------------------------------------------------------------------------------------------------------  Anticipated Length of Stay is > 2 midnights    Care Time Delivered:   No Critical Care time spent       [facility] AND [facility] COLE Parr PA-C        Portions of the record may have been created with voice recognition software  Occasional wrong word or "sound a like" substitutions may have occurred due to the inherent limitations of voice recognition software    Read the chart carefully and recognize, using context, where substitutions have occurred

## 2022-08-02 NOTE — ASSESSMENT & PLAN NOTE
Continue to monitor phosphorus levels  Most likely related to to renal disease and elevated creatinine

## 2022-08-02 NOTE — PROGRESS NOTES
Progress Note - General Surgery   Julieth Dumont 73 y o  male MRN: [id number]  Unit/Bed#: -01 SDU Encounter: [id number]    Assessment/Plan:    Diverticulitis vs colitis  -CT scan reviewed, inflammatory changes of sigmoid colon, no free or abscess  -portal venous air possible related to recent EGD 7/25  -no findings to suggest ischemic bowel, no pneumatosis, LA WNL  -mild leukocytosis improved this AM, follow  -continue to monitor abdominal exam, benign this AM  -check stool studies  -continue ABx (Zosyn), NPO, IVFs  -pain control and anti-emetics prn  -supportive care  -no indication for surgical intervention at this time    DORA on CKD  -likely 2/2 dehydration, vomiting  -creat 5 78 on admit, baseline 2-2 3  -continue IVF resuscitation  -monitor renal indices, avoid nephrotoxic agents    AFIB on Coumadin  -Coumadin on hold  -continue rate control, on Toprol    Anemia  -HGB 7 7 this AM, monitor, transfuse as indicated    Multiple myeloma  -on chemo, last infusion 7/26    CHF  -monitor volume status     COPD, obesity, GERD, NALLELY    Subjective/Objective   Chief Complaint: abdominal pain    Subjective:  Abdominal pain slightly better this morning  No further diarrhea or vomiting  Denies fevers or chills  No acute events overnight  Objective:    Blood pressure 137/60, pulse 81, temperature 98 4 °F (36 9 °C), temperature source Oral, resp  rate 18, height 6' (1 829 m), weight 128 kg (282 lb 6 6 oz), SpO2 97 %  ,Body mass index is 38 3 kg/m²        Intake/Output Summary (Last 24 hours) at 8/2/2022 0919  Last data filed at 8/2/2022 0548  Gross per 24 hour   Intake 3910 42 ml   Output 0 ml   Net 3910 42 ml       Invasive Devices  Report    Peripheral Intravenous Line  Duration           Peripheral IV 08/01/22 Right Antecubital <1 day                Physical Exam:   General appearance: alert and oriented, in no acute distress, obese  Head: Normocephalic, without obvious abnormality, atraumatic, sclerae anicteric, mucous membranes moist  Neck: no JVD and supple, symmetrical, trachea midline  Lungs: clear to auscultation, no wheezes or rales  Heart:  Irregularly irregular rhythm, rate controlled  Abdomen:  Obese, soft, mild diffuse tenderness, no rebound or guarding, no peritoneal signs, few bowel sounds  Extremities:   No edema, redness or tenderness in the calves or thighs  Skin: Warm, dry; pale  Nursing notes and vital signs reviewed      Lab, Imaging and other studies:  I have personally reviewed pertinent lab results    , CBC:   Lab Results   Component Value Date    WBC 8 92 08/02/2022    HGB 7 5 (L) 08/02/2022    HCT 24 4 (L) 08/02/2022    MCV 85 08/02/2022     (L) 08/02/2022    MCH 26 2 (L) 08/02/2022    MCHC 30 7 (L) 08/02/2022    RDW 19 7 (H) 08/02/2022    MPV 10 5 08/02/2022    NRBC 1 08/02/2022   , CMP:   Lab Results   Component Value Date    SODIUM 144 08/02/2022    K 5 1 08/02/2022     (H) 08/02/2022    CO2 14 (L) 08/02/2022    CO2 11 (L) 08/01/2022    BUN 89 (H) 08/02/2022    CREATININE 5 32 (H) 08/02/2022    GLUCOSE 64 (L) 08/01/2022    CALCIUM 8 2 (L) 08/02/2022    AST 14 08/02/2022    ALT 21 08/02/2022    ALKPHOS 101 08/02/2022    EGFR 10 08/02/2022     VTE Pharmacologic Prophylaxis: none, anemia  VTE Mechanical Prophylaxis: sequential compression device     Sandee Sánchez PA-C

## 2022-08-02 NOTE — CONSULTS
Consultation - [id number] [facility] Gastroenterology     Tanesha Rosario 73 y o  male MRN: [id number]  Unit/Bed#: -01 SDU Encounter: [id number]    Consults    ASSESSMENT and PLAN    1  Nausea/vomiting/diarrhea  Worsening renal failure with CT scan showing diverticulitis and air in the portal vein  Infectious diverticulitis versus ischemic colitis  With normal lactate doubt colonic infarction  With recent negative colonoscopy doubt colonic malignancy  - follow exam closely  - IV antibiotics with IV fluids and bowel rest  - surgery consulted    2  Acute on chronic kidney disease    3  Multiple myeloma on chemotherapy    4  AFib on Coumadin  Holding anticoagulation in case intervention needed    Chief Complaint   Patient presents with    Vomiting     +N/V/D starting 7/29  Pt had a colonoscopy 7/25, 7/26 had a chemo tx  Elevated kidney levels on OP bloodwork  Physician Requesting Consult: Sherie Hylton MD    HPI  Tanesha Rosario is a 73y o  year old male with history of multiple myeloma chronic kidney disease and atrial fibrillation on anticoagulation who presents to the emergency room with nausea/vomiting, diarrhea, and weakness  He reports his baseline bowel movement pattern is constipation  He denies any GI bleeding  He had a colonoscopy done in July with 2 polyps removed  He also had left-sided diverticulosis  In emergency room he had worsening kidney failure  He had non gap acidosis  CT scan showed left-sided diverticulitis with some air in the portal vein  Patient was subsequently admitted  He currently denies any abdominal pain  He denies any further nausea or vomiting    He does admit intermittent abdominal swelling    Historical Information   Past Medical History:   Diagnosis Date    [facility]     CHF (congestive heart failure) (HCC)     Chronic kidney disease     [facility]     COVID-19     Decubitus ulcer of heel     LAST ASSESSED [id number]    Diabetes mellitus ([facility] )     History of varicose veins     Hypertension     Intermittent claudication (HCC)     Neuropathy     Seasonal allergies      Past Surgical History:   Procedure Laterality Date    AMPUTATION ABOVE KNEE (AKA) Left 7/31/2019    Procedure: AMPUTATION ABOVE KNEE (AKA);   Surgeon: Javier Rutledge MD;  Location: [facility];  Service: General    ANGIOPLASTY      W/ FLUOROSC ANGIOGRAPGY PERIPHERAL ARTERY ADDITIONAL  LAST ASSESSED [id number]    ANGIOPLASTY / STENTING FEMORAL      TANSCATH INTRAVASCULAR STENT PLACEMENT PERCUTANEOUS FEMORAL     COLONOSCOPY  2010    CT BONE MARROW BIOPSY AND ASPIRATION  8/9/2019    FULL THICKNESS SKIN GRAFT      TENDON REPAIR      TOE AMPUTATION Left 12/27/2018    Procedure: AMPUTATION left 4th TOE;  Surgeon: Verónica Avilez DPM;  Location: [facility];  Service: Podiatry     Social History   Social History     Substance and Sexual Activity   Alcohol Use Not Currently     Social History     Substance and Sexual Activity   Drug Use Not Currently     Social History     Tobacco Use   Smoking Status Never Smoker   Smokeless Tobacco Never Used     Family History   Problem Relation Age of Onset    Other Mother         CARDIAC DISORDER     Diabetes Mother     Cancer Father     Other Family         CARDIAC DISORDER     Diabetes Family     Cancer Family     Mental illness Family     Kidney disease Sister     Diabetes Sister        Meds/Allergies     Current Facility-Administered Medications   Medication Dose Route Frequency    fluticasone (FLONASE) 50 mcg/act nasal spray 1 spray  1 spray Each Nare BID    fluticasone-vilanterol (BREO ELLIPTA) 200-25 MCG/INH inhaler 1 puff  1 puff Inhalation Daily    multi-electrolyte (PLASMALYTE-A/ISOLYTE-S PH 7 4) IV solution  125 mL/hr Intravenous Continuous    pantoprazole (PROTONIX) injection 40 mg  40 mg Intravenous Q24H [facility]    piperacillin-tazobactam (ZOSYN) 2 25 g in sodium chloride 0 9 % 50 mL IVPB  2 25 g Intravenous Q6H    sodium bicarbonate 150 mEq in dextrose 5 % 1,000 mL infusion  125 mL/hr Intravenous Continuous     Medications Prior to Admission   Medication    acetaminophen (TYLENOL) 500 mg tablet    albuterol (PROVENTIL HFA,VENTOLIN HFA) 90 mcg/act inhaler    Alcohol Swabs (ALCOHOL PREP) 70 % PADS    allopurinol (ZYLOPRIM) 300 mg tablet    ammonium lactate (LAC-HYDRIN) 12 % lotion    atorvastatin (LIPITOR) 40 mg tablet    BD AUTOSHIELD DUO 30G X 5 MM MISC    BD INSULIN SYRINGE U/F 31G X 5/16" 0 5 ML MISC    BD PEN NEEDLE HILARIO U/F 32G X 4 MM MISC    bisacodyl (DULCOLAX) 10 mg suppository    bisacodyl (DULCOLAX) 5 mg EC tablet    bisacodyl (DULCOLAX) 5 mg EC tablet    bortezomib (VELCADE)    clotrimazole (LOTRIMIN) 1 % cream    colchicine (COLCRYS) 0 6 mg tablet    docusate sodium (COLACE) 100 mg capsule    Easy Touch Safety Lancets 28G MISC    fluticasone (FLONASE) 50 mcg/act nasal spray    fluticasone-salmeterol (ADVAIR DISKUS, WIXELA INHUB) 250-50 mcg/dose inhaler    insulin glargine (Lantus SoloStar) 100 units/mL injection pen    liraglutide (Victoza) injection    loperamide (IMODIUM) 2 mg capsule    magnesium hydroxide (MILK OF MAGNESIA) 400 mg/5 mL oral suspension    Melatonin 5 MG TABS    metFORMIN (GLUCOPHAGE) 1000 MG tablet    metoprolol succinate (TOPROL-XL) 25 mg 24 hr tablet    multivitamin-minerals therapeutic (THERA-M)    NOVOLOG FLEXPEN 100 units/mL SOPN    nystatin (MYCOSTATIN) powder    omeprazole (PriLOSEC) 40 MG capsule    ondansetron (ZOFRAN) 4 mg tablet    polyethylene glycol (GOLYTELY) 4000 mL solution    polyethylene glycol (GOLYTELY) 4000 mL solution    polyethylene glycol (MIRALAX) 17 g packet    potassium chloride (K-DUR,KLOR-CON) 20 mEq tablet    torsemide (DEMADEX) 20 mg tablet    warfarin (COUMADIN) 1 mg tablet    warfarin (COUMADIN) 6 mg tablet       Allergies   Allergen Reactions    Latex Rash       PHYSICAL EXAM    Blood pressure 137/60, pulse 81, temperature 98 4 °F (36 9 °C), temperature source Oral, resp  rate 18, height 6' (1 829 m), weight 128 kg (282 lb 6 6 oz), SpO2 97 %  Body mass index is 38 3 kg/m²  General Appearance: NAD, cooperative, alert  Eyes: Anicteric, PERRLA, EOMI  ENT:  Normocephalic, atraumatic, normal mucosa  Neck:  Supple, symmetrical, trachea midline  Resp:  Clear to auscultation bilaterally; no rales, rhonchi or wheezing; respirations unlabored   CV:  S1 S2, Regular rate and rhythm; no murmur, rub, or gallop  GI:  Soft, mild/moderate left-sided abdominal tenderness without rebound or guarding, non-distended; normal bowel sounds; no masses, no organomegaly   Rectal: Deferred  Musculoskeletal: No cyanosis, clubbing or edema  Normal ROM  Skin:  No jaundice, rashes, or lesions   Heme/Lymph: No palpable cervical lymphadenopathy  Psych: Normal affect, good eye contact  Neuro: No gross deficits, AAOx3    Lab Results   Component Value Date    GLUCOSE 64 (L) 08/01/2022    CALCIUM 8 2 (L) 08/02/2022     01/15/2018    K 5 1 08/02/2022    CO2 14 (L) 08/02/2022     (H) 08/02/2022    BUN 89 (H) 08/02/2022    CREATININE 5 32 (H) 08/02/2022     Lab Results   Component Value Date    WBC 8 92 08/02/2022    HGB 7 5 (L) 08/02/2022    HCT 24 4 (L) 08/02/2022    MCV 85 08/02/2022     (L) 08/02/2022     Lab Results   Component Value Date    ALT 21 08/02/2022    AST 14 08/02/2022     (H) 11/03/2019    ALKPHOS 101 08/02/2022    BILITOT 0 6 01/15/2018     No results found for: AMYLASE  Lab Results   Component Value Date    LIPASE 338 08/02/2022     Lab Results   Component Value Date    IRON 26 (L) 10/04/2021    TIBC 250 10/04/2021    FERRITIN 131 10/04/2021     Lab Results   Component Value Date    INR 2 56 (H) 08/02/2022       Imaging Studies: I have personally reviewed pertinent reports  EKG, Pathology, and Other Studies: I have personally reviewed pertinent reports        REVIEW OF SYSTEMS:    CONSTITUTIONAL: Denies any fever, chills, rigors, and weight loss  HEENT: No earache or tinnitus  Denies hearing loss or visual disturbances  CARDIOVASCULAR: No chest pain or palpitations  RESPIRATORY: Denies any cough, hemoptysis, shortness of breath or dyspnea on exertion  GASTROINTESTINAL: As noted in the History of Present Illness  GENITOURINARY: No problems with urination  Denies any hematuria or dysuria  NEUROLOGIC: No dizziness or vertigo, denies headaches  MUSCULOSKELETAL: Denies any muscle or joint pain  SKIN: Denies skin rashes or itching  ENDOCRINE: Denies excessive thirst  Denies intolerance to heat or cold  PSYCHOSOCIAL: Denies depression or anxiety  Denies any recent memory loss

## 2022-08-02 NOTE — CONSULTS
Consultation - General Surgery   Pankaj Rees 73 y o  male MRN: [id number]  Unit/Bed#: ED 05 Encounter: [id number]    Assessment/Plan     Assessment/Plan:    Abnormal CT scan, acute diverticulitis vs colitis:  - patient with nausea, vomiting, diarrhea, decreased PO intake that began 4 days ago  - WBC slightly elevated at 10  4  vitals stable  - LA 1 9, will repeat  - CT scan without contrast read as "acute sigmoid diverticulitis with intrahepatic and extrahepatic portal venous air, concerning for ischemic bowel  No pneumatosis identified"  - patient vitals are stable, lactic WNL (will repeat)  Denies abdominal pain, only has pain with palpation  - plan for NPO, IV fluids, IV abx  Monitor exam  If deterioration, patient may require diagnostic laparoscopy  -  Has received 2L bolus in ED, will add one more liter bolus  Recommend aggressive IVF resuscitation  - stool studies, c diff ordered  - GI consult  Had colonoscopy 7/25   - case was discussed with on call surgeon, Dr Maureen Trent  DORA:  - Cr is 5 78 on admission, creatinine March of 2022 was 2 32   - BUN also significantly elevated  - ?etiology, likely in setting of dehydration 2/2 vomiting and diarrhea  - fluid boluses ordered  Hyperkalemia:  - potassium 5 5 on admission  - trend lytes  - care per SLIM    Multiple Myeloma:  - last chemotherapy infusion was 7/26  - pt with diarrhea, stool cultures and c diff ordered    Chronic afib:  - rate controlled on metoprolol  - anticoagulated on coumadin, INR ordered    HTN, HLD, DM2, NALLELY, obesity        History of Present Illness     HPI:  Pankaj Rees is a 73 y o  male with PMHx of HTN, HLD, DM2, chronic afib, multiple myeloma, NALLELY, obesity who presents with nausea, vomiting, diarrhea, decreased PO intake for 4 days  Patient states he has had multiple episodes of vomiting, has not noticed any blood  He states he vomited today   He has also been having diarrhea, which has been on and off now for some time  Patient denies abdominal pain  He denies chest pain, shortness of breath, fevers, chills  He denies any blood in his stool or in his vomit  He denies prior abdominal surgeries  He states he has not been able to eat recently because he cant keep anything down due to vomiting  He sees oncology outpatient for multiple myeloma and underwent chemotherapy infusion (Velcade and decadron) on 7/26  He underwent colonoscopy on 7/25 for which he had two flat polyps removed, with recall of 3 years  Colonoscopy also revealed mild diverticulosis  Patient will be admitted to [place] [address] with general surgery consult  He will undergo resuscitation with IV fluids  LA in ED was 1 9, will repeat  Monitor abdominal exam  Zosyn, npo, iv fluids  Consider operative management if patient deteriorates, currently vitals are stable, wbc and LA stable, no pneumatosis on CT scan  Inpatient consult to Acute Care Surgery  Consult performed by: Andrew Clarke PA-C  Consult ordered by: Tamika Mendenhall PA-C          Review of Systems   Constitutional: Negative for chills and fever  HENT: Negative for ear pain and sore throat  Eyes: Negative for pain and visual disturbance  Respiratory: Negative for cough and shortness of breath  Cardiovascular: Negative for chest pain and palpitations  Gastrointestinal: Positive for diarrhea, nausea and vomiting  Negative for abdominal pain and blood in stool  Genitourinary: Negative for dysuria and hematuria  Musculoskeletal: Negative for arthralgias and back pain  Skin: Negative for color change and rash  Neurological: Negative for seizures and syncope  All other systems reviewed and are negative        Historical Information   Past Medical History:   Diagnosis Date    Cancer ([facility] )     CHF (congestive heart failure) (HCC)     Chronic kidney disease     COPD (chronic obstructive pulmonary disease) ([facility] )     COVID-19     Decubitus ulcer of heel     LAST ASSESSED [id number]    Diabetes mellitus ([facility]     History of varicose veins     Hypertension     Intermittent claudication (HCC)     Neuropathy     Seasonal allergies      Past Surgical History:   Procedure Laterality Date    AMPUTATION ABOVE KNEE (AKA) Left 7/31/2019    Procedure: AMPUTATION ABOVE KNEE (AKA); Surgeon: Mer Yates MD;  Location:  MAIN OR;  Service: General    ANGIOPLASTY      W/ FLUOROSC ANGIOGRAPGY PERIPHERAL ARTERY ADDITIONAL  LAST ASSESSED [id number]    ANGIOPLASTY / STENTING FEMORAL      TANSCATH INTRAVASCULAR STENT PLACEMENT PERCUTANEOUS FEMORAL     COLONOSCOPY  2010    CT BONE MARROW BIOPSY AND ASPIRATION  8/9/2019    FULL THICKNESS SKIN GRAFT      TENDON REPAIR      TOE AMPUTATION Left 12/27/2018    Procedure: AMPUTATION left 4th TOE;  Surgeon: Elzbieta Pratt DPM;  Location: [facility];  Service: Podiatry     Social History   Social History     Substance and Sexual Activity   Alcohol Use Not Currently     Social History     Substance and Sexual Activity   Drug Use Not Currently     E-Cigarette/Vaping    E-Cigarette Use Never User      E-Cigarette/Vaping Substances    Nicotine No     THC No     CBD No     Flavoring No     Other No     Unknown No      Social History     Tobacco Use   Smoking Status Never Smoker   Smokeless Tobacco Never Used     Family History:   Family History   Problem Relation Age of Onset    Other Mother         CARDIAC DISORDER     Diabetes Mother     Cancer Father     Other Family         CARDIAC DISORDER     Diabetes Family     Cancer Family     Mental illness Family     Kidney disease Sister     Diabetes Sister        Meds/Allergies   PTA meds:   Prior to Admission Medications   Prescriptions Last Dose Informant Patient Reported? Taking?    Alcohol Swabs (ALCOHOL PREP) 70 % PADS  Outside Facility (Specify) Yes No   BD AUTOSHIELD DUO 30G X 5 MM MISC  Outside Facility (Specify) No No   Sig: USE AS DIRECTED WITH INSULIN FOUR TIMES A DAY BD INSULIN SYRINGE U/F 31G X 5/16" 0 5 ML MIS  Outside Facility (Specify) Yes No   Sig: USE AS DIRECTED WITH NOVOLIN 70/30   BD PEN NEEDLE HILARIO U/F 32G X 4 MM MIS  Outside Facility (Specify) No No   Sig: by Other route 3 (three) times a day   Easy Touch Safety Lancets 28G MIS  Outside Facility (Specify) No No   Sig: USE 4 TIMES DAILY TO TEST BLOOD SUGAR   Melatonin 5 MG TABS  Outside Facility (Specify) No No   Sig: TAKE ONE TABLET BY MOUTH EVERY NIGHT AT BEDTIME   NOVOLOG FLEXPEN 100 units/mL SOPN  Outside Facility (Specify) No No   Sig: INJ 5U BEFORE MEALS AND PER SS <250=NO CALL 250-300=5U,301-350= 10U,351-400=15U,401-450=20U>451 CALL FOR ORDERS UP TO 4X PER DAY   acetaminophen (TYLENOL) 500 mg tablet  Outside Facility (Specify) No No   Sig: Take 1 tablet (500 mg total) by mouth every 6 (six) hours as needed for mild pain, headaches or fever   albuterol (PROVENTIL HFA,VENTOLIN HFA) 90 mcg/act inhaler  Outside Facility (Specify) No No   Sig: Inhale 2 puffs every 6 (six) hours as needed for wheezing   allopurinol (ZYLOPRIM) 300 mg tablet   No No   Sig: TAKE ONE TABLET BY MOUTH DAILY   Patient taking differently: 300 mg   ammonium lactate (LAC-HYDRIN) 12 % lotion  Outside Facility (Specify) No No   Sig: APPLY TO BLE TOPICALLY DAILY   atorvastatin (LIPITOR) 40 mg tablet  Outside Facility (Specify) No No   Sig: Take 1 tablet (40 mg total) by mouth daily after dinner   bisacodyl (DULCOLAX) 10 mg suppository   Yes No   Sig: Insert 10 mg into the rectum as needed for constipation   Patient not taking: No sig reported   bisacodyl (DULCOLAX) 5 mg EC tablet  Outside Facility (Specify) Yes No   Sig: Take 5 mg by mouth 2 (two) times a day   bisacodyl (DULCOLAX) 5 mg EC tablet   No No   Sig: Please follow prep instructions provided by the office     Patient not taking: No sig reported   bortezomib (VELCADE)   Yes No   Sig: Infuse into a venous catheter once     Patient not taking: No sig reported   clotrimazole (LOTRIMIN) 1 % cream   No No   Sig: APPLY TO BUTTOCK 2 TIMES DAILY   Patient not taking: No sig reported   colchicine (COLCRYS) 0 6 mg tablet   No No   Sig: Take 1 tablet (0 6 mg total) by mouth daily   Patient not taking: No sig reported   docusate sodium (COLACE) 100 mg capsule  Outside Facility (Specify) No No   Sig: Take 1 capsule (100 mg total) by mouth 2 (two) times a day   fluticasone (FLONASE) 50 mcg/act nasal spray  Outside Facility (Specify) Yes No   fluticasone-salmeterol (ADVAIR DISKUS, WIXELA INHUB) 250-50 mcg/dose inhaler  Outside Facility (Specify) No No   Sig: Inhale 1 puff 2 (two) times a day Rinse mouth after use     insulin glargine (Lantus SoloStar) 100 units/mL injection pen  Outside Facility (Specify) No No   Sig: Inject 22 Units under the skin daily   liraglutide (Victoza) injection  Outside Facility (Specify) Yes No   Sig: Inject 1 8 mg under the skin daily   loperamide (IMODIUM) 2 mg capsule  Outside Facility (Specify) Yes No   Sig: Take 2 mg by mouth 2 (two) times a day as needed for diarrhea   magnesium hydroxide (MILK OF MAGNESIA) 400 mg/5 mL oral suspension  Outside Facility (Specify) Yes No   Sig: Take 30 mL by mouth daily as needed for constipation   metFORMIN (GLUCOPHAGE) 1000 MG tablet   No No   Sig: TAKE ONE TABLET BY MOUTH TWO TIMES A DAY   Patient taking differently: Take 1,000 mg by mouth 2 (two) times a day with meals   metoprolol succinate (TOPROL-XL) 25 mg 24 hr tablet  Outside Facility (Specify) No No   Sig: Take 1 tablet (25 mg total) by mouth daily   multivitamin-minerals therapeutic (THERA-M)   Yes No   nystatin (MYCOSTATIN) powder  Outside Facility (Specify) Yes No   Sig: Apply 240,080 application topically 4 (four) times a day   omeprazole (PriLOSEC) 40 MG capsule  Outside Facility (Specify) No No   Sig: Take 1 capsule (40 mg total) by mouth daily Half an hour prior to breakfast   ondansetron (ZOFRAN) 4 mg tablet  Outside Facility (Specify) Yes No   Sig: ondansetron HCl 4 mg tablet TAKE 1 TABLET BY MOUTH EVERY 8 HOURS AS NEEDED   polyethylene glycol (GOLYTELY) 4000 mL solution   No No   Sig: Take 4,000 mL by mouth once for 1 dose   Patient not taking: Reported on 7/18/2022   polyethylene glycol (GOLYTELY) 4000 mL solution   No No   Sig: Please follow prep instructions provided by the office   Patient taking differently: 17 g Please follow prep instructions provided by the office   polyethylene glycol (MIRALAX) 17 g packet  Outside Facility (Specify) No No   Sig: Take 17 g by mouth daily   potassium chloride (K-DUR,KLOR-CON) 20 mEq tablet  Outside Facility (Specify) No No   Sig: Take 2 tablets (40 mEq total) by mouth daily   torsemide (DEMADEX) 20 mg tablet  Outside Facility (Specify) No No   Sig: Take 2 tablets (40 mg total) by mouth 2 (two) times a day   warfarin (COUMADIN) 1 mg tablet   Yes No   Sig: Take 1 mg by mouth daily at bedtime 2 mg on Wed and Sun   warfarin (COUMADIN) 6 mg tablet  Outside Facility (Specify) Yes No      Facility-Administered Medications: None     Allergies   Allergen Reactions    Latex Rash       Objective   First Vitals:   Blood Pressure: 111/54 (08/01/22 2028)  Pulse: 68 (08/01/22 2028)  Temperature: 97 9 °F (36 6 °C) (08/01/22 2028)  Temp Source: Temporal (08/01/22 2028)  Respirations: 22 (08/01/22 2028)  Height: 6' (182 9 cm) (08/01/22 2028)  Weight - Scale: 132 kg (290 lb) (08/01/22 2028)  SpO2: 100 % (08/01/22 2028)    Current Vitals:   Blood Pressure: 130/58 (08/01/22 2251)  Pulse: 74 (08/01/22 2251)  Temperature: 97 9 °F (36 6 °C) (08/01/22 2028)  Temp Source: Temporal (08/01/22 2028)  Respirations: 22 (08/01/22 2251)  Height: 6' (182 9 cm) (08/01/22 2028)  Weight - Scale: 132 kg (290 lb) (08/01/22 2028)  SpO2: 100 % (08/01/22 2251)      Intake/Output Summary (Last 24 hours) at 8/1/2022 2308  Last data filed at 8/1/2022 2212  Gross per 24 hour   Intake 1000 ml   Output --   Net 1000 ml       Invasive Devices  Report    Peripheral Intravenous Line  Duration Peripheral IV 08/01/22 Right Antecubital <1 day                Physical Exam  Vitals and nursing note reviewed  Constitutional:       General: He is not in acute distress  Appearance: He is well-developed  He is obese  Comments: Patient resting in ED stretcher in no acute distress  Patient is pleasant and holds conversation without any difficulties  HENT:      Head: Normocephalic and atraumatic  Eyes:      General: No scleral icterus  Conjunctiva/sclera: Conjunctivae normal    Cardiovascular:      Rate and Rhythm: Normal rate  Rhythm irregular  Heart sounds: No murmur heard  Pulmonary:      Effort: Pulmonary effort is normal  No respiratory distress  Breath sounds: Normal breath sounds  Abdominal:      General: Abdomen is protuberant  There is no distension  Palpations: Abdomen is soft  Tenderness: There is abdominal tenderness  There is no rebound  Comments: Bowel sounds hyperactive, abdomen is obese and protuberant  Tenderness to palpation in bilateral lower abdomen with voluntary guarding  No rebound  Musculoskeletal:      Cervical back: Neck supple  Skin:     General: Skin is warm and dry  Capillary Refill: Capillary refill takes less than 2 seconds  Neurological:      General: No focal deficit present  Mental Status: He is alert  Psychiatric:         Mood and Affect: Mood normal          Lab Results:   I have personally reviewed pertinent lab results    , CBC:   Lab Results   Component Value Date    WBC 10 44 (H) 08/01/2022    HGB 7 5 (L) 08/01/2022    HCT 22 (L) 08/01/2022    MCV 87 08/01/2022     08/01/2022    MCH 26 5 (L) 08/01/2022    MCHC 30 6 (L) 08/01/2022    RDW 19 7 (H) 08/01/2022    MPV 11 7 08/01/2022    NRBC 1 08/01/2022   , CMP:   Lab Results   Component Value Date    SODIUM 143 08/01/2022    K 5 5 (H) 08/01/2022     (H) 08/01/2022    CO2 11 (L) 08/01/2022    CO2 12 (L) 08/01/2022    BUN 95 (H) 08/01/2022 CREATININE 5 78 (H) 08/01/2022    GLUCOSE 64 (L) 08/01/2022    CALCIUM 8 8 08/01/2022    AST 15 08/01/2022    ALT 23 08/01/2022    ALKPHOS 109 08/01/2022    EGFR 9 08/01/2022   , Coagulation: No results found for: PT, INR, APTT, Lipase:   Lab Results   Component Value Date    LIPASE 443 (H) 08/01/2022     Imaging: I have personally reviewed pertinent reports  EKG, Pathology, and Other Studies: I have personally reviewed pertinent reports        Kathryn Montague PA-C

## 2022-08-02 NOTE — UTILIZATION REVIEW
Inpatient Admission Authorization Request   NOTIFICATION OF INPATIENT ADMISSION/INPATIENT AUTHORIZATION REQUEST   SERVICING FACILITY:   [place] [address]  Tax ID: [id number]  NPI: [id number]  Place of Service: Inpatient [facility]  [address]  Place of Service Code: 24     ATTENDING PROVIDER:  Attending Name and NPI#: Garrett Olguin Md [[id number]]  Address: [address] [place] [address]  Phone: [phone]     UTILIZATION REVIEW CONTACT:  Kolby Serna, Utilization   Network Utilization Review Department  Phone: [phone]  Fax [phone]  Email: [email or web address]     PHYSICIAN ADVISORY SERVICES:  FOR MTOG-IM-YJIL REVIEW - MEDICAL NECESSITY DENIAL  Phone: [phone]  Fax: [phone]  Email: [email or web address]     TYPE OF REQUEST:  Inpatient Status     ADMISSION INFORMATION:  ADMISSION DATE/TIME: 8/1/22 11:05 PM  PATIENT DIAGNOSIS CODE/DESCRIPTION:  Vomiting [R11 10]  DORA (acute kidney injury) ([facility] ) [N17 9]  Acute diverticulitis [K57 92]  DISCHARGE DATE/TIME: No discharge date for patient encounter  IMPORTANT INFORMATION:  Please contact Kolby Serna directly with any questions or concerns regarding this request  Department voicemails are confidential     Send requests for admission clinical reviews, concurrent reviews, approvals, and administrative denials due to lack of clinical to fax [phone]

## 2022-08-02 NOTE — CASE MANAGEMENT
Case Management Assessment & Discharge Planning Note    Patient name Trish Jimenez  Location  SDU/-01 S* MRN [id number]  : 1950 Date 2022       Current Admission Date: 2022  Current Admission Diagnosis:Acute diverticulitis   Patient Active Problem List    Diagnosis Date Noted    Acute diverticulitis 2022    Hyperkalemia 2022    High anion gap metabolic acidosis     Hyperphosphatemia 2022    Stage [facility]b [facility] 2022    Anemia 10/04/2021    Supratherapeutic INR 10/04/2021    Traumatic skin ulcer ([facility] ) 2021    Ambulatory dysfunction 2021    Venous hypertension, chronic, with ulcer and inflammation, right ([facility] ) 2020    Need for influenza vaccination 2020    Fall from bed 2020    Sepsis due to COVID-19 pneumonia ([facility] ) 2020    Difficulty in walking, not elsewhere classified 2020    Venous insufficiency (chronic) (peripheral) 2020    Rash 2019    Lymphedema 2019    Chronic diastolic [facility]congestive) heart failure [facility] 2019    Gout 2019    Mass of psoas muscle 2019    CKD (chronic kidney disease) 2019    Hiatal hernia 10/29/2019    Weakness 10/29/2019    Type 2 acute myocardial infarction ([facility] ) 10/29/2019    Above knee amputation of left lower extremity ([facility] ) 2019    Multiple myeloma ([facility] ) 2019    Chronic obstructive pulmonary disease, unspecified ([facility] ) 2019    Hypertensive chronic kidney disease w stg 1-4/unsp chr kdny 2019    DORA (acute kidney injury) ([facility] ) 2019    SOB (shortness of breath) 2019    NALLELY (obstructive sleep apnea) 2019    Moderate persistent asthma without complication     Peripheral vascular disease [facility] 2019    Localized edema 2018    Acute on chronic combined systolic and diastolic congestive heart failure (HCC) 10/11/2017    Chronic atrial fibrillation ([facility] ) 10/09/2017    Morbid obesity ([place] [address] ) 10/09/2017    Gallstones 07/05/2017    Diabetic foot ulcer ([place] [address] ) 09/15/2016    Diabetes mellitus type 2, uncontrolled 09/15/2016    Chronic venous hypertension, right 09/15/2016    Essential hypertension 09/15/2016    Cardiomyopathy ([place] [address] ) 02/17/2016    Onychomycosis 10/27/2015    Diabetes, polyneuropathy ([place] [address] ) 08/21/2014    GERD (gastroesophageal reflux disease) 07/24/2014    Hyperlipidemia 07/24/2014      LOS (days): 1  Geometric Mean LOS (GMLOS) (days): 2 60  Days to GMLOS:1 9     OBJECTIVE:    Risk of Unplanned Readmission Score: 38 87     Current admission status: Inpatient    Preferred Pharmacy:   [facility] [address] [place] [address]  Phone: [phone] Fax: [phone]    Primary Care Provider: Bjorn Medina MD    Primary Insurance: [facility]  Secondary Insurance:     ASSESSMENT:  Dixie 26 Proxies    There are no active Health Care Proxies on file  Readmission Root Cause  30 Day Readmission: No    Patient Information  Admitted from[de-identified] Facility [facility])  Mental Status: Confused  During Assessment patient was accompanied by: Not accompanied during assessment  Assessment information provided by[de-identified] Other - please comment [facility], left message for Pt's son)  Primary Caregiver: Other (Comment) [facility])  Support Systems: [address] [place] [address] of Residence: [address] [place] [address] do you live in?: [address] [place] [address] entry access options   Select all that apply : No steps to enter home  Type of Current Residence: Other (Comment) [facility])  In the last 12 months, was there a time when you were not able to pay the mortgage or rent on time?: No  In the last 12 months, how many places have you lived?: 1  In the last 12 months, was there a time when you did not have a steady place to sleep or slept in a shelter (including now)?: No  Homeless/housing insecurity resource given?: N/A  Living Arrangements: Other (Comment) [facility])  Is patient a ?: No    Activities of Daily Living Prior to Admission  Functional Status: Assistance  Completes ADLs independently?: No  Level of ADL dependence: Assistance  Ambulates independently?: No  Level of ambulatory dependence: Total Dependent (linden lift to chair)  Does patient use assisted devices?: Yes  Assisted Devices (DME) used: Other (Comment) (linden lift)  Does patient currently own DME?: No  Does patient have a history of Outpatient Therapy (PT/OT)?: No  Does the patient have a history of Short-Term Rehab?: No  Does patient have a history of HHC?: No  Does patient currently have [facility]?: No    Patient Information Continued  Income Source: Pension/alf    Means of Transportation  Means of Transport to Appts[de-identified] Other (Comment) [facility])  Was application for public transport provided?: N/A    DISCHARGE DETAILS:    Additional Comments: Per Pt's nurse, Pt is currently confused  Acknowledge Pt is resident [facility]  Per Patrica Pt is extensive assist with adls and linden lift into wheelchair  Call placed to Pt's son(Gerald: [phone]), left message  Anticipate return call  Referral sent to [facility] via [address]

## 2022-08-02 NOTE — ASSESSMENT & PLAN NOTE
Hyperkalemic treatment with IV fluid  Continue to monitor potassium every 8 hours  Arrival potassium was 5 5 repeat was 5 4

## 2022-08-03 LAB
ALBUMIN SERPL BCP-MCNC: 2.3 G/DL (ref 3.5–5)
ALP SERPL-CCNC: 90 U/L (ref 46–116)
ALT SERPL W P-5'-P-CCNC: 17 U/L (ref 12–78)
ANION GAP SERPL CALCULATED.3IONS-SCNC: 13 MMOL/L (ref 4–13)
ANION GAP SERPL CALCULATED.3IONS-SCNC: 18 MMOL/L (ref 4–13)
AST SERPL W P-5'-P-CCNC: 14 U/L (ref 5–45)
BASE EX.OXY STD BLDV CALC-SCNC: 82.5 % (ref 60–80)
BASE EX.OXY STD BLDV CALC-SCNC: 90.5 % (ref 60–80)
BASE EXCESS BLDV CALC-SCNC: -3.5 MMOL/L
BASE EXCESS BLDV CALC-SCNC: -8.6 MMOL/L
BASOPHILS # BLD AUTO: 0.03 THOUSANDS/ΜL (ref 0–0.1)
BASOPHILS NFR BLD AUTO: 0 % (ref 0–1)
BETA-HYDROXYBUTYRATE: 0.3 MMOL/L
BILIRUB SERPL-MCNC: 0.7 MG/DL (ref 0.2–1)
BUN SERPL-MCNC: 82 MG/DL (ref 5–25)
BUN SERPL-MCNC: 82 MG/DL (ref 5–25)
CALCIUM ALBUM COR SERPL-MCNC: 9.5 MG/DL (ref 8.3–10.1)
CALCIUM SERPL-MCNC: 8 MG/DL (ref 8.3–10.1)
CALCIUM SERPL-MCNC: 8.1 MG/DL (ref 8.3–10.1)
CHLORIDE SERPL-SCNC: 110 MMOL/L (ref 96–108)
CHLORIDE SERPL-SCNC: 112 MMOL/L (ref 96–108)
CO2 SERPL-SCNC: 18 MMOL/L (ref 21–32)
CO2 SERPL-SCNC: 23 MMOL/L (ref 21–32)
CREAT SERPL-MCNC: 5.02 MG/DL (ref 0.6–1.3)
CREAT SERPL-MCNC: 5.08 MG/DL (ref 0.6–1.3)
EOSINOPHIL # BLD AUTO: 0.08 THOUSAND/ΜL (ref 0–0.61)
EOSINOPHIL NFR BLD AUTO: 1 % (ref 0–6)
ERYTHROCYTE [DISTWIDTH] IN BLOOD BY AUTOMATED COUNT: 19.8 % (ref 11.6–15.1)
GFR SERPL CREATININE-BSD FRML MDRD: 10 ML/MIN/1.73SQ M
GFR SERPL CREATININE-BSD FRML MDRD: 10 ML/MIN/1.73SQ M
GLUCOSE SERPL-MCNC: 146 MG/DL (ref 65–140)
GLUCOSE SERPL-MCNC: 161 MG/DL (ref 65–140)
GLUCOSE SERPL-MCNC: 164 MG/DL (ref 65–140)
GLUCOSE SERPL-MCNC: 181 MG/DL (ref 65–140)
GLUCOSE SERPL-MCNC: 188 MG/DL (ref 65–140)
GLUCOSE SERPL-MCNC: 215 MG/DL (ref 65–140)
GLUCOSE SERPL-MCNC: 221 MG/DL (ref 65–140)
GLUCOSE SERPL-MCNC: 224 MG/DL (ref 65–140)
HCO3 BLDV-SCNC: 16 MMOL/L (ref 24–30)
HCO3 BLDV-SCNC: 20.8 MMOL/L (ref 24–30)
HCT VFR BLD AUTO: 21.9 % (ref 36.5–49.3)
HCT VFR BLD AUTO: 22.5 % (ref 36.5–49.3)
HCT VFR BLD AUTO: 22.8 % (ref 36.5–49.3)
HGB BLD-MCNC: 7.1 G/DL (ref 12–17)
HGB BLD-MCNC: 7.3 G/DL (ref 12–17)
HGB BLD-MCNC: 7.3 G/DL (ref 12–17)
IMM GRANULOCYTES # BLD AUTO: 0.16 THOUSAND/UL (ref 0–0.2)
IMM GRANULOCYTES NFR BLD AUTO: 2 % (ref 0–2)
INR PPP: 3.59 (ref 0.84–1.19)
LYMPHOCYTES # BLD AUTO: 0.87 THOUSANDS/ΜL (ref 0.6–4.47)
LYMPHOCYTES NFR BLD AUTO: 9 % (ref 14–44)
MAGNESIUM SERPL-MCNC: 1.9 MG/DL (ref 1.6–2.6)
MCH RBC QN AUTO: 26.5 PG (ref 26.8–34.3)
MCHC RBC AUTO-ENTMCNC: 32 G/DL (ref 31.4–37.4)
MCV RBC AUTO: 83 FL (ref 82–98)
MONOCYTES # BLD AUTO: 0.63 THOUSAND/ΜL (ref 0.17–1.22)
MONOCYTES NFR BLD AUTO: 7 % (ref 4–12)
NEUTROPHILS # BLD AUTO: 7.52 THOUSANDS/ΜL (ref 1.85–7.62)
NEUTS SEG NFR BLD AUTO: 81 % (ref 43–75)
NRBC BLD AUTO-RTO: 1 /100 WBCS
O2 CT BLDV-SCNC: 7.8 ML/DL
O2 CT BLDV-SCNC: 8.8 ML/DL
PCO2 BLDV: 29.1 MM HG (ref 42–50)
PCO2 BLDV: 33.6 MM HG (ref 42–50)
PH BLDV: 7.36 [PH] (ref 7.3–7.4)
PH BLDV: 7.41 [PH] (ref 7.3–7.4)
PLATELET # BLD AUTO: 165 THOUSANDS/UL (ref 149–390)
PMV BLD AUTO: 11.6 FL (ref 8.9–12.7)
PO2 BLDV: 48.8 MM HG (ref 35–45)
PO2 BLDV: 78.1 MM HG (ref 35–45)
POTASSIUM SERPL-SCNC: 3.7 MMOL/L (ref 3.5–5.3)
POTASSIUM SERPL-SCNC: 4.1 MMOL/L (ref 3.5–5.3)
PROT SERPL-MCNC: 5.4 G/DL (ref 6.4–8.4)
PROTHROMBIN TIME: 37.5 SECONDS (ref 11.6–14.5)
RBC # BLD AUTO: 2.75 MILLION/UL (ref 3.88–5.62)
SODIUM SERPL-SCNC: 146 MMOL/L (ref 135–147)
SODIUM SERPL-SCNC: 148 MMOL/L (ref 135–147)
WBC # BLD AUTO: 9.29 THOUSAND/UL (ref 4.31–10.16)

## 2022-08-03 PROCEDURE — 80053 COMPREHEN METABOLIC PANEL: CPT | Performed by: INTERNAL MEDICINE

## 2022-08-03 PROCEDURE — 99232 SBSQ HOSP IP/OBS MODERATE 35: CPT | Performed by: SURGERY

## 2022-08-03 PROCEDURE — 82948 REAGENT STRIP/BLOOD GLUCOSE: CPT

## 2022-08-03 PROCEDURE — 93005 ELECTROCARDIOGRAM TRACING: CPT

## 2022-08-03 PROCEDURE — 99232 SBSQ HOSP IP/OBS MODERATE 35: CPT | Performed by: INTERNAL MEDICINE

## 2022-08-03 PROCEDURE — 85018 HEMOGLOBIN: CPT | Performed by: INTERNAL MEDICINE

## 2022-08-03 PROCEDURE — 83735 ASSAY OF MAGNESIUM: CPT | Performed by: INTERNAL MEDICINE

## 2022-08-03 PROCEDURE — 82805 BLOOD GASES W/O2 SATURATION: CPT | Performed by: PHYSICIAN ASSISTANT

## 2022-08-03 PROCEDURE — C9113 INJ PANTOPRAZOLE SODIUM, VIA: HCPCS | Performed by: PHYSICIAN ASSISTANT

## 2022-08-03 PROCEDURE — 85014 HEMATOCRIT: CPT | Performed by: INTERNAL MEDICINE

## 2022-08-03 PROCEDURE — 85025 COMPLETE CBC W/AUTO DIFF WBC: CPT | Performed by: INTERNAL MEDICINE

## 2022-08-03 PROCEDURE — 82805 BLOOD GASES W/O2 SATURATION: CPT | Performed by: INTERNAL MEDICINE

## 2022-08-03 PROCEDURE — 85610 PROTHROMBIN TIME: CPT | Performed by: INTERNAL MEDICINE

## 2022-08-03 PROCEDURE — 80048 BASIC METABOLIC PNL TOTAL CA: CPT | Performed by: INTERNAL MEDICINE

## 2022-08-03 PROCEDURE — 87493 C DIFF AMPLIFIED PROBE: CPT | Performed by: PHYSICIAN ASSISTANT

## 2022-08-03 PROCEDURE — 87505 NFCT AGENT DETECTION GI: CPT | Performed by: PHYSICIAN ASSISTANT

## 2022-08-03 PROCEDURE — 82010 KETONE BODYS QUAN: CPT | Performed by: INTERNAL MEDICINE

## 2022-08-03 RX ORDER — MAGNESIUM SULFATE HEPTAHYDRATE 40 MG/ML
2 INJECTION, SOLUTION INTRAVENOUS ONCE
Status: COMPLETED | OUTPATIENT
Start: 2022-08-03 | End: 2022-08-03

## 2022-08-03 RX ORDER — POTASSIUM CHLORIDE 20 MEQ/1
20 TABLET, EXTENDED RELEASE ORAL ONCE
Status: COMPLETED | OUTPATIENT
Start: 2022-08-03 | End: 2022-08-03

## 2022-08-03 RX ORDER — FUROSEMIDE 10 MG/ML
60 INJECTION INTRAMUSCULAR; INTRAVENOUS ONCE
Status: COMPLETED | OUTPATIENT
Start: 2022-08-03 | End: 2022-08-03

## 2022-08-03 RX ADMIN — PIPERACILLIN SODIUM AND TAZOBACTAM SODIUM 3.38 G: 3; .375 INJECTION, POWDER, LYOPHILIZED, FOR SOLUTION INTRAVENOUS at 17:58

## 2022-08-03 RX ADMIN — INSULIN LISPRO 4 UNITS: 100 INJECTION, SOLUTION INTRAVENOUS; SUBCUTANEOUS at 17:40

## 2022-08-03 RX ADMIN — FLUTICASONE PROPIONATE 1 SPRAY: 50 SPRAY, METERED NASAL at 08:36

## 2022-08-03 RX ADMIN — SODIUM BICARBONATE 125 ML/HR: 84 INJECTION, SOLUTION INTRAVENOUS at 13:59

## 2022-08-03 RX ADMIN — MAGNESIUM SULFATE HEPTAHYDRATE 2 G: 40 INJECTION, SOLUTION INTRAVENOUS at 20:26

## 2022-08-03 RX ADMIN — INSULIN LISPRO 4 UNITS: 100 INJECTION, SOLUTION INTRAVENOUS; SUBCUTANEOUS at 13:02

## 2022-08-03 RX ADMIN — FUROSEMIDE 60 MG: 10 INJECTION, SOLUTION INTRAVENOUS at 11:10

## 2022-08-03 RX ADMIN — PANTOPRAZOLE SODIUM 40 MG: 40 INJECTION, POWDER, FOR SOLUTION INTRAVENOUS at 08:34

## 2022-08-03 RX ADMIN — FLUTICASONE FUROATE AND VILANTEROL TRIFENATATE 1 PUFF: 200; 25 POWDER RESPIRATORY (INHALATION) at 08:36

## 2022-08-03 RX ADMIN — ACETAMINOPHEN 650 MG: 325 TABLET ORAL at 22:20

## 2022-08-03 RX ADMIN — SODIUM BICARBONATE 150 ML/HR: 84 INJECTION, SOLUTION INTRAVENOUS at 08:47

## 2022-08-03 RX ADMIN — FLUTICASONE PROPIONATE 1 SPRAY: 50 SPRAY, METERED NASAL at 17:41

## 2022-08-03 RX ADMIN — PIPERACILLIN SODIUM AND TAZOBACTAM SODIUM 3.38 G: 3; .375 INJECTION, POWDER, LYOPHILIZED, FOR SOLUTION INTRAVENOUS at 05:19

## 2022-08-03 RX ADMIN — INSULIN LISPRO 2 UNITS: 100 INJECTION, SOLUTION INTRAVENOUS; SUBCUTANEOUS at 08:38

## 2022-08-03 RX ADMIN — POTASSIUM CHLORIDE 20 MEQ: 1500 TABLET, EXTENDED RELEASE ORAL at 20:26

## 2022-08-03 NOTE — PROGRESS NOTES
Progress note - Gastroenterology   Marina Aleman 73 y o  male MRN: [id number]  Unit/Bed#: -01 SDU Encounter: [id number]    ASSESSMENT and PLAN    1  Nausea/vomiting/diarrhea  Worsening renal failure with CT scan showing diverticulitis and air in the portal vein  With normal lactate doubt colonic infarction  With recent negative colonoscopy doubt colonic malignancy  Infectious diverticulitis versus ischemic colitis  Per pt nurse, formed BM 8/2, loose this a m  Starting clear liquids today  - follow exam closely  - IV antibiotics with IV fluids   - surgery consulted, no surgical intervention at this time     2  Acute on chronic kidney disease  Creatinine in March 2 32, on admission, 5 78  With a m  labs, 5 02    3  Multiple myeloma on chemotherapy     4  AFib on Coumadin  Holding anticoagulation in case intervention needed    Chief Complaint   Patient presents with    Vomiting     +N/V/D starting 7/29  Pt had a colonoscopy 7/25, 7/26 had a chemo tx  Elevated kidney levels on OP bloodwork  SUBJECTIVE/HPI   On exam, pt has left side abdominal pain  Appears alert to self  Will start clear liquids today      /87   Pulse 77   Temp 97 6 °F (36 4 °C) (Axillary)   Resp 20   Ht 6' (1 829 m)   Wt 131 kg (288 lb 12 8 oz)   SpO2 96%   BMI 39 17 kg/m²     PHYSICALEXAM  General appearance: alert, appears stated age and cooperative  Eyes: PERLLA, EOMI, no icterus   Head: Normocephalic, without obvious abnormality, atraumatic  Lungs: clear to auscultation bilaterally  Heart: regular rate and rhythm, S1, S2 normal, no murmur, click, rub or gallop  Abdomen: soft, non-tender; bowel sounds normal; no masses,  no organomegaly  Extremities: extremities normal, atraumatic, no cyanosis or edema  Neurologic: Grossly normal    Lab Results   Component Value Date    GLUCOSE 64 (L) 08/01/2022    CALCIUM 8 1 (L) 08/03/2022     01/15/2018    K 4 1 08/03/2022    CO2 18 (L) 08/03/2022     (H) 08/03/2022    BUN 82 (H) 08/03/2022    CREATININE 5 02 (H) 08/03/2022     Lab Results   Component Value Date    WBC 9 29 08/03/2022    HGB 7 3 (L) 08/03/2022    HCT 22 8 (L) 08/03/2022    MCV 83 08/03/2022     08/03/2022     Lab Results   Component Value Date    ALT 17 08/03/2022    AST 14 08/03/2022     (H) 11/03/2019    ALKPHOS 90 08/03/2022    BILITOT 0 6 01/15/2018     No results found for: AMYLASE  Lab Results   Component Value Date    LIPASE 338 08/02/2022     Lab Results   Component Value Date    IRON 26 (L) 10/04/2021    TIBC 250 10/04/2021    FERRITIN 131 10/04/2021     Lab Results   Component Value Date    INR 3 59 (H) 08/03/2022

## 2022-08-03 NOTE — ASSESSMENT & PLAN NOTE
Patient admitted with Creatinine elevated at 5 78   Creatinine this morning is 5 02  Most likely due to diarrhea and vomiting  Baseline creatinine in low 2s  Continue on bicarb drip  Nephrology consult appreciated  Avoid hypotension/nephrotoxins medication/NSAIDs  Trend BMP 1.87

## 2022-08-03 NOTE — PROGRESS NOTES
[place]  Progress Note - Airam Espinosa 1950, 73 y o  male MRN: [id number]  Unit/Bed#: -01 SDU Encounter: [id number]  Primary Care Provider: Jessie Box MD   Date and time admitted to hospital: 8/1/2022  8:27 PM    Hyperkalemia  Assessment & Plan  Admitted with potassium of 5 5  Resolved    Stage 3b chronic kidney disease [facility])  Assessment & Plan  Lab Results   Component Value Date    EGFR 10 08/03/2022    EGFR 10 08/02/2022    EGFR 9 08/02/2022    CREATININE 5 02 (H) 08/03/2022    CREATININE 5 28 (H) 08/02/2022    CREATININE 5 38 (H) 08/02/2022   See above    Ambulatory dysfunction  Assessment & Plan  P T /OT consult  Has history of left BKA    Chronic obstructive pulmonary disease, unspecified ([facility] [address] )  Assessment & Plan  Continue Flonase and use Breo instead of Advair   Stable at this time    Multiple myeloma [facility])  Assessment & Plan  Last chemo infusion was on July 26  Continue outpatient follow-up    DORA (acute kidney injury) [facility])  Assessment & Plan  Patient admitted with Creatinine elevated at 5 78   Creatinine this morning is 5 02  Most likely due to diarrhea and vomiting  Baseline creatinine in low 2s  Continue on bicarb drip  Nephrology consult appreciated  Avoid hypotension/nephrotoxins medication/NSAIDs  Trend BMP    NALLELY (obstructive sleep apnea)  Assessment & Plan  History of obstructive sleep apnea with noncompliance with CPAP    GERD (gastroesophageal reflux disease)  Assessment & Plan  On Protonix    Morbid obesity ([facility] [address] )  Assessment & Plan  Encourage weight loss and lifestyle modification    Chronic atrial fibrillation [facility])  Assessment & Plan  Patient is on Toprol-XL and Coumadin at home  Hold for now  Continue to hold Coumadin as INR is supratherapeutic  Monitor PT INR    * Acute diverticulitis  Assessment & Plan  Patient is 62-year-old female with an extensive medical history who has nausea, vomiting and diarrhea with decreased p o   Intake over the last 4 days prior to admission  · CT scan - acute sigmoid diverticulitis with intrahepatic and extrahepatic portal venous air is standing for ischemic bowel no pneumatosis  · Surgery and GI on board  · Received 2 L of IV bolus in the ED  · Continue on IV bicarb drip  · On IV Zosyn  · Stool studies and C diff pending  · Pain management p r n  · Antiemetics p r n  · As per surgery no surgical intervention needed at this time  · GI recommends bowel rest and IV fluids  · Trend WBC and fever curve          Labs & Imaging: I have personally reviewed pertinent reports  VTE Prophylaxis: in place  Code Status:   Level 1 - Full Code    Patient Centered Rounds: I have performed bedside rounds with nursing staff today  Discussions with Specialists or Other Care Team Provider:  GI    Education and Discussions with Family / Patient:  Attempted to call son Amber Felix    Current Length of Stay: 2 day(s)    Current Patient Status: Inpatient   Certification Statement: The patient will continue to require additional inpatient hospital stay due to see my assessment and plan  Subjective:   Patient is seen and examined at bedside  Overnight patient was curious and pulling IVs  Has some abdominal pain  Afebrile  All other ROS are negative  Objective:    Vitals: Blood pressure 134/87, pulse 77, temperature 97 6 °F (36 4 °C), temperature source Axillary, resp  rate 20, height 6' (1 829 m), weight 131 kg (288 lb 12 8 oz), SpO2 96 %  ,Body mass index is 39 17 kg/m²  SPO2 RA Rest    Flowsheet Row ED to Hosp-Admission (Current) from 8/1/2022 in [address] Intensive Care Unit   SpO2 96 %   SpO2 Activity At Rest   O2 Device None (Room air)   O2 Flow Rate --        I&O:     Intake/Output Summary (Last 24 hours) at 8/3/2022 4592  Last data filed at 8/3/2022 0400  Gross per 24 hour   Intake 3588 92 ml   Output 200 ml   Net 3388 92 ml       Physical Exam:    General- Alert, lying comfortably in bed   Not in any acute distress  Neck- Supple, No JVD  CVS- irregular, S1 and S2 normal  Chest- Bilateral Air entry, No rhochi, crackles or wheezing present  Abdomen- soft, nontender, not distended, no guarding or rigidity, BS+  Extremities-  No pedal edema, No calf tenderness  Left AKA  CNS-   Alert, awake and orientedx2  No focal deficits present      Invasive Devices  Report    Peripheral Intravenous Line  Duration           Peripheral IV 08/01/22 Right Antecubital 1 day    Peripheral IV 08/03/22 Right Forearm <1 day                      Social History  reviewed  Family History   Problem Relation Age of Onset    Other Mother         CARDIAC DISORDER     Diabetes Mother     Cancer Father     Other Family         CARDIAC DISORDER     Diabetes Family     Cancer Family     Mental illness Family     Kidney disease Sister     Diabetes Sister     reviewed    Meds:  Current Facility-Administered Medications   Medication Dose Route Frequency Provider Last Rate Last Admin    acetaminophen (TYLENOL) tablet 650 mg  650 mg Oral Q6H PRN Sherie Hylton MD        fluticasone (FLONASE) 50 mcg/act nasal spray 1 spray  1 spray Each Nare BID Julia Parr PA-C   1 spray at 08/02/22 1729    fluticasone-vilanterol (BREO ELLIPTA) 200-25 MCG/INH inhaler 1 puff  1 puff Inhalation Daily Julia Parr PA-C   1 puff at 08/02/22 0936    insulin lispro (HumaLOG) 100 units/mL subcutaneous injection 2-12 Units  2-12 Units Subcutaneous TID AC Sherie Hylton MD   2 Units at 08/02/22 1835    ondansetron (ZOFRAN) injection 4 mg  4 mg Intravenous Q6H PRN Sherie Hylton MD        pantoprazole (PROTONIX) injection 40 mg  40 mg Intravenous Q24H [facility] Julia Parr PA-C   40 mg at 08/02/22 0917    piperacillin-tazobactam (ZOSYN) 3 375 g in sodium chloride 0 9 % 100 mL IVPB (EXTENDED-INFUSION)  3 375 g Intravenous Q12H Sherie Hylton MD 25 mL/hr at 08/03/22 0519 3 375 g at 08/03/22 0519    sodium bicarbonate 150 mEq in dextrose 5 % 1,000 mL infusion  150 mL/hr Intravenous Continuous Selena Eugene, NICOLA 150 mL/hr at 08/02/22 1343 150 mL/hr at 08/02/22 1343      Medications Prior to Admission   Medication    acetaminophen (TYLENOL) 500 mg tablet    albuterol (PROVENTIL HFA,VENTOLIN HFA) 90 mcg/act inhaler    Alcohol Swabs (ALCOHOL PREP) 70 % PADS    allopurinol (ZYLOPRIM) 300 mg tablet    ammonium lactate (LAC-HYDRIN) 12 % lotion    atorvastatin (LIPITOR) 40 mg tablet    BD AUTOSHIELD DUO 30G X 5 MM MISC    BD INSULIN SYRINGE U/F 31G X 5/16" 0 5 ML MISC    BD PEN NEEDLE HILARIO U/F 32G X 4 MM MISC    bisacodyl (DULCOLAX) 10 mg suppository    bisacodyl (DULCOLAX) 5 mg EC tablet    bisacodyl (DULCOLAX) 5 mg EC tablet    bortezomib (VELCADE)    clotrimazole (LOTRIMIN) 1 % cream    colchicine (COLCRYS) 0 6 mg tablet    docusate sodium (COLACE) 100 mg capsule    Easy Touch Safety Lancets 28G MISC    fluticasone (FLONASE) 50 mcg/act nasal spray    fluticasone-salmeterol (ADVAIR DISKUS, WIXELA INHUB) 250-50 mcg/dose inhaler    insulin glargine (Lantus SoloStar) 100 units/mL injection pen    liraglutide (Victoza) injection    loperamide (IMODIUM) 2 mg capsule    magnesium hydroxide (MILK OF MAGNESIA) 400 mg/5 mL oral suspension    Melatonin 5 MG TABS    metFORMIN (GLUCOPHAGE) 1000 MG tablet    metoprolol succinate (TOPROL-XL) 25 mg 24 hr tablet    multivitamin-minerals therapeutic (THERA-M)    NOVOLOG FLEXPEN 100 units/mL SOPN    nystatin (MYCOSTATIN) powder    omeprazole (PriLOSEC) 40 MG capsule    ondansetron (ZOFRAN) 4 mg tablet    polyethylene glycol (GOLYTELY) 4000 mL solution    polyethylene glycol (GOLYTELY) 4000 mL solution    polyethylene glycol (MIRALAX) 17 g packet    potassium chloride (K-DUR,KLOR-CON) 20 mEq tablet    torsemide (DEMADEX) 20 mg tablet    warfarin (COUMADIN) 1 mg tablet    warfarin (COUMADIN) 6 mg tablet       Labs:  Results from last 7 days   Lab Units 08/03/22  2100 08/03/22  0047 08/02/22  1622 08/02/22  0423 08/02/22  0109   WBC Thousand/uL 9 29  --   --  8 92 10 26*   HEMOGLOBIN g/dL 7 3* 7 1* 7 3* 7 5* 8 1*   HEMATOCRIT % 22 8* 21 9* 24 4* 24 4* 26 8*   PLATELETS Thousands/uL 165  --   --  132* 146*   NEUTROS PCT % 81*  --   --  84* 80*   LYMPHS PCT % 9*  --   --  8* 11*   MONOS PCT % 7  --   --  5 6   EOS PCT % 1  --   --  1 1     Results from last 7 days   Lab Units 08/03/22  0512 08/02/22  1622 08/02/22  0951 08/02/22  0423 08/02/22  0109 08/01/22  2342   POTASSIUM mmol/L 4 1 4 4 4 8 5 1   < >  --    CHLORIDE mmol/L 112* 115* 116* 116*   < >  --    CO2 mmol/L 18* 16* 14* 14*   < >  --    CO2, I-STAT mmol/L  --   --   --   --   --  11*   BUN mg/dL 82* 87* 91* 89*   < >  --    CREATININE mg/dL 5 02* 5 28* 5 38* 5 32*   < >  --    CALCIUM mg/dL 8 1* 8 1* 8 2* 8 2*   < >  --    ALK PHOS U/L 90  --  100 101   < >  --    ALT U/L 17  --  21 21   < >  --    AST U/L 14  --  18 14   < >  --    GLUCOSE, ISTAT mg/dl  --   --   --   --   --  64*    < > = values in this interval not displayed  Lab Results   Component Value Date    TROPONINI 5 19 (H) 06/27/2020    TROPONINI 5 85 (H) 06/27/2020    TROPONINI 6 35 (H) 06/27/2020    CKMB 4 1 06/25/2020    CKTOTAL 271 06/25/2020    CKTOTAL 53 03/23/2018    CKTOTAL 55 10/08/2017     Results from last 7 days   Lab Units 08/03/22  0512 08/02/22  0423 08/02/22  0033   INR  3 59* 2 56* 2 41*     Lab Results   Component Value Date    BLOODCX No Growth After 5 Days  10/04/2021    BLOODCX No Growth After 5 Days  10/04/2021    BLOODCX No Growth After 5 Days  06/25/2020    BLOODCX No Growth After 5 Days   06/25/2020    URINECX >100,000 cfu/ml Escherichia coli (A) 12/05/2019    WOUNDCULT 4+ Growth of Proteus mirabilis (A) 07/25/2019    WOUNDCULT 4+ Growth of  07/25/2019    WOUNDCULT (A) 07/25/2019     4+ Growth of Methicillin Resistant Staphylococcus aureus    WOUNDCULT 1+ Growth of Proteus mirabilis (A) 07/25/2019    WOUNDCULT 4+ Growth of 07/25/2019    SPUTUMCULTUR 4+ Growth of  07/11/2020    SPUTUMCULTUR 1+ Growth of Aspergillus fumigatus (A) 07/11/2020         Imaging:  Results for orders placed during the hospital encounter of 06/25/20    XR chest portable    Narrative  CHEST    INDICATION:   hypoxia  COMPARISON:  Chest radiograph from 6/25/2020  Chest CT from 10/29/2019  EXAM PERFORMED/VIEWS:  XR CHEST PORTABLE      FINDINGS:    Cardiomegaly  Patchy consolidation throughout the right lung  No effusion or pneumothorax  Old right clavicle fracture  Impression  Patchy consolidation throughout the right lung which could be due to pneumonia  Workstation performed: [id number]    Results for orders placed during the hospital encounter of 10/04/21    XR chest pa & lateral    Narrative  CHEST    INDICATION:   follow up lung consolidations  COMPARISON:  CTA chest/abdomen/pelvis 10/4/2021  Chest radiograph 7/8/2020  EXAM PERFORMED/VIEWS:  XR CHEST PA & LATERAL      FINDINGS:    Heart shadow is enlarged but unchanged from prior exam     Again seen is dense left lower lobe/retrocardiac consolidation with ill-defined airspace opacities in the lingula and right lower lobe, similar to recent chest CT  Trace left pleural effusion  No pneumothorax  Osseous structures appear within normal limits for patient age  Impression  Stable left lower lobe/retrocardiac consolidation and lingular and right lower lobe ill-defined airspace opacities  Trace left pleural effusion          Workstation performed: [id number]      Last 24 Hours Medication List:   Current Facility-Administered Medications   Medication Dose Route Frequency Provider Last Rate    acetaminophen  650 mg Oral Q6H PRN Kishan Damico MD      fluticasone  1 spray Each Nare BID Julia Parr PA-C      fluticasone-vilanterol  1 puff Inhalation Daily Julia Parr PA-C      insulin lispro  2-12 Units Subcutaneous TID AC MD Arvind Macdonald ondansetron  4 mg Intravenous Q6H PRN Harley Sanders MD      pantoprazole  40 mg Intravenous Q24H [address] Julia Parr PA-C      piperacillin-tazobactam  3 375 g Intravenous Q12H Harley Sanders MD 3 375 g (08/03/22 0519)    sodium bicarbonate infusion  150 mL/hr Intravenous Continuous Alvino Calico, CRNP 150 mL/hr (08/02/22 1343)        Today, Patient Was Seen By: Harley Sanders MD    ** Please Note: Dictation voice to text software may have been used in the creation of this document   **

## 2022-08-03 NOTE — ASSESSMENT & PLAN NOTE
Lab Results   Component Value Date    EGFR 10 08/03/2022    EGFR 10 08/02/2022    EGFR 9 08/02/2022    CREATININE 5 02 (H) 08/03/2022    CREATININE 5 28 (H) 08/02/2022    CREATININE 5 38 (H) 08/02/2022   See above

## 2022-08-03 NOTE — PROGRESS NOTES
NEPHROLOGY PROGRESS NOTE   Bay Seen 73 y o  male MRN: [id number]  Unit/Bed#: -01 SDU Encounter: [id number]  Reason for Consult: DORA (POA) on CKD IIIB/IV    82-year-old male with history of CKD stage IIIB/4, diabetes, CHF, multiple myeloma on chemotherapy, COPD, atrial fibrillation, and obesity, who presents from nursing facility with nausea, vomiting, diarrhea, and decreased oral intake  Nephrology was consulted for acute kidney injury  ASSESSMENT/PLAN:  DORA(POA) on CKD IIIB/IV:  Suspect prerenal etiology with volume depletion in the setting of nausea, vomiting, diarrhea, decreased oral intake   -baseline creatinine previously 1 2-1 4 prior to October 2021, more recently high 1s to low 2s   -presents with creatinine of 5 78   -creatinine improved to 5 09   -follows with Dr Kaiser Quevedo   -imaging shows unremarkable kidneys   -bladder scan insignificant   -UA negative for hematuria or proteinuria, 1-2 RBCs, 1-2 WBCs  -receiving IV sodium bicarbonate   -continue to avoid nephrotoxins, hypotension, IV contrast   -strict I/O  Okay to insert mehta x 48 hours   -no urgent indication for RRT at this time      Anion gap acidosis: In the setting of acute renal failure and increased GI loss  -received 2 L of fluid   -lactic acid level normal   -continue on bicarb drip  Decreased to 125 cc/hr      Hyperkalemia:  Initial potassium level 5 5  On supplements per medication list  (resolved)  -will continue to monitor and treat as needed  Avoid potassium supplementation  -recommend low-potassium diet   -bladder scan negative for obstruction      Hyperphosphatemia:  Suspected due to acute renal failure   -most recent phosphorus level 5 1   -will continue to monitor and trend phosphorus level  Consider starting on phosphorus binders if not improving with renal function  Hypernatremia:   -continues on IVF and advanced to clear liquid diet     -will continue to monitor and trend      Hypertension:  Blood pressure with fluctuation    -home medications:  Metoprolol 25 mg daily, torsemide 40 mg 2 times per day  -currently not on antihypertensives  -would avoid hypotension or high fluctuations in blood pressure      Diverticulitis versus colitis:  Presenting with nausea, vomiting, and diarrhea  -CT scan showed inflammatory changes of sigmoid colon, no free air or abscess   -general surgery team following  Per note, no surgical intervention indicated at this time  -GI team following   -currently on antibiotics  -continues on IV fluids, started on clear liquid diet today      CHF:  Most recent echo with EF of 45%  -chest x-ray currently pending   -home diuretic:  Torsemide 40 mg 2 times per day  -currently holding diuretics, may give as needed  Will give 60 mg of IV lasix today due to SOB , chest xray personally reviewed and appears congested    -check daily weights, follow fluid restriction, strict I/O      Multiple myeloma:  Receiving treatment with chemotherapy, last infusion 07/26/2022      Atrial fibrillation:  -maintained on beta blocker  Coumadin currently on hold      Anemia: In the setting of multiple myeloma  Following outpatient with Hematology   -continue to monitor and transfuse as needed for hemoglobin less than 7 0      Other:  COPD, obesity, GERD, NALLELY  Disposition:  Requiring additional stay due to medical needs  SUBJECTIVE:  The patient is resting in his bed  He is confused, alert and oriented x person only  He tells me he is running out of oxygen and needs to placed on oxygen  He does not appear to be in any apparent respiratory distress       OBJECTIVE:  Current Weight: Weight - Scale: 131 kg (288 lb 12 8 oz)  Vitals:    08/03/22 0200 08/03/22 0400 08/03/22 0418 08/03/22 0600   BP: 150/76 146/67 146/67 134/87   BP Location:   Left arm    Pulse: 82 81 77 77   Resp: (!) 23 22 19 20   Temp:   97 6 °F (36 4 °C)    TempSrc:   Axillary    SpO2: 96% 95% 96% 96%   Weight:    131 kg (288 lb 12 8 oz)   Height:           Intake/Output Summary (Last 24 hours) at 8/3/2022 5665  Last data filed at 8/3/2022 0400  Gross per 24 hour   Intake 3588 92 ml   Output 200 ml   Net 3388 92 ml     General: NAD, ill appearing   Skin: warm, dry, intact, no rash  HEENT: Moist mucous membranes, sclera anicteric, normocephalic, atraumatic  Neck: No apparent JVD appreciated  Chest: lung sounds clear B/L, on RA   CVS:Regular rate and rhythm, no murmer   Abdomen: Soft, round, non-tender, +BS, obese  Extremities: B/L LE edema present  Neuro: alert and oriented x1  Psych: appropriate mood and affect     Medications:    Current Facility-Administered Medications:     acetaminophen (TYLENOL) tablet 650 mg, 650 mg, Oral, Q6H PRN, Riaz Francois MD    fluticasone (FLONASE) 50 mcg/act nasal spray 1 spray, 1 spray, Each Nare, BID, Julia Parr PA-C, 1 spray at 08/03/22 0836    fluticasone-vilanterol (BREO ELLIPTA) 200-25 MCG/INH inhaler 1 puff, 1 puff, Inhalation, Daily, Julia Parr PA-C, 1 puff at 08/03/22 0836    insulin lispro (HumaLOG) 100 units/mL subcutaneous injection 2-12 Units, 2-12 Units, Subcutaneous, TID AC, 2 Units at 08/03/22 0838 **AND** Fingerstick Glucose (POCT), , , TID AC, Zohaib Antonia Krishna MD    ondansetron (ZOFRAN) injection 4 mg, 4 mg, Intravenous, Q6H PRN, Riaz Francois MD    pantoprazole (PROTONIX) injection 40 mg, 40 mg, Intravenous, Q24H [address], Julia Parr PA-C, 40 mg at 08/03/22 0834    piperacillin-tazobactam (ZOSYN) 3 375 g in sodium chloride 0 9 % 100 mL IVPB (EXTENDED-INFUSION), 3 375 g, Intravenous, Q12H, Riaz Francois MD, Last Rate: 25 mL/hr at 08/03/22 0519, 3 375 g at 08/03/22 0519    sodium bicarbonate 150 mEq in dextrose 5 % 1,000 mL infusion, 150 mL/hr, Intravenous, Continuous, Tariq Franc, CRNP, Last Rate: 150 mL/hr at 08/03/22 0847, 150 mL/hr at 08/03/22 0847    Laboratory Results:  Results from last 7 days   Lab Units 08/03/22 0512 08/03/22  0047 08/02/22  1622 08/02/22  0951 08/02/22  0423 08/02/22  0109 08/01/22  2342   WBC Thousand/uL 9 29  --   --   --  8 92 10 26*  --    HEMOGLOBIN g/dL 7 3* 7 1* 7 3*  --  7 5* 8 1*  --    I STAT HEMOGLOBIN g/dl  --   --   --   --   --   --  7 5*   HEMATOCRIT % 22 8* 21 9* 24 4*  --  24 4* 26 8*  --    HEMATOCRIT, ISTAT %  --   --   --   --   --   --  22*   PLATELETS Thousands/uL 165  --   --   --  132* 146*  --    SODIUM mmol/L 148*  --  146 145 144 143  --    POTASSIUM mmol/L 4 1  --  4 4 4 8 5 1 5 4*  --    CHLORIDE mmol/L 112*  --  115* 116* 116* 117*  --    CO2 mmol/L 18*  --  16* 14* 14* 14*  --    CO2, I-STAT mmol/L  --   --   --   --   --   --  11*   BUN mg/dL 82*  --  87* 91* 89* 92*  --    CREATININE mg/dL 5 02*  --  5 28* 5 38* 5 32* 5 44*  --    CALCIUM mg/dL 8 1*  --  8 1* 8 2* 8 2* 8 0*  --    MAGNESIUM mg/dL 1 9  --   --   --  1 9 1 7  --    PHOSPHORUS mg/dL  --   --   --   --  5 1* 5 5*  --    ALK PHOS U/L 90  --   --  100 101 101  --    ALT U/L 17  --   --  21 21 17  --    AST U/L 14  --   --  18 14 13  --    GLUCOSE, ISTAT mg/dl  --   --   --   --   --   --  64*

## 2022-08-03 NOTE — ASSESSMENT & PLAN NOTE
Patient is on Toprol-XL and Coumadin at home  Hold for now  Continue to hold Coumadin as INR is supratherapeutic  Monitor PT INR

## 2022-08-03 NOTE — PROGRESS NOTES
Progress Note - General Surgery   Delisa Day 73 y o  male MRN: [id number]  Unit/Bed#: -01 SDU Encounter: [id number]    Assessment/Plan:  Diverticulitis vs colitis  -CT scan reviewed, inflammatory changes of sigmoid colon, no free air or abscess  -portal venous air possible related to recent EGD 7/25  -no findings to suggest ischemic bowel, no pneumatosis, LA WNL  -mild leukocytosis improved  -abdominal pain, vomiting, diarrhea resolved  -okay for clear liquids, advanced as tolerated  -check stool studies, still pending  -continue ABx (Zosyn)  -supportive care  -no indication for surgical intervention at this time, will sign off     DORA on CKD  -likely 2/2 dehydration, vomiting  -creat 5 78 on admit, baseline 2-2 3  -management per nephrology  -avoid nephrotoxic agents     AFIB on Coumadin  -Coumadin on hold  -continue rate control, on Toprol     Anemia  -HGB 7 3 this AM, monitor, transfuse as indicated     Multiple myeloma  -on chemo, last infusion 7/26     CHF  -monitor volume status      COPD, obesity, GERD, NALLELY    Subjective/Objective   Chief Complaint:  I want something to eat    Subjective:  Patient asking for something to drink  States he is hungry  Denies abdominal pain  Had a solid bowel movement yesterday  No further nausea, vomiting, or diarrhea  Objective:    Blood pressure (!) 154/101, pulse 84, temperature 97 7 °F (36 5 °C), temperature source Oral, resp  rate (!) 26, height 6' (1 829 m), weight 131 kg (288 lb 12 8 oz), SpO2 97 %  ,Body mass index is 39 17 kg/m²        Intake/Output Summary (Last 24 hours) at 8/3/2022 1146  Last data filed at 8/3/2022 0400  Gross per 24 hour   Intake 2991 ml   Output 200 ml   Net 2791 ml       Invasive Devices  Report    Peripheral Intravenous Line  Duration           Peripheral IV 08/01/22 Right Antecubital 1 day    Peripheral IV 08/03/22 Right Forearm <1 day                Physical Exam:   General appearance: alert and oriented, in no acute distress  Head: Normocephalic, without obvious abnormality, atraumatic, sclerae anicteric, mucous membranes moist  Neck: no JVD and supple, symmetrical, trachea midline  Lungs: clear to auscultation, no wheezes or rales  Heart:   Regular rate and rhythm, S1-S2 normal, no murmur  Abdomen:   Obese, soft, nontender, active bowel sounds  Extremities:   No edema, redness or tenderness in the calves or thighs  Skin: Warm  Nursing notes and vital signs reviewed      Lab, Imaging and other studies:  CBC:   Lab Results   Component Value Date    WBC 9 29 08/03/2022    HGB 7 3 (L) 08/03/2022    HCT 22 8 (L) 08/03/2022    MCV 83 08/03/2022     08/03/2022    MCH 26 5 (L) 08/03/2022    MCHC 32 0 08/03/2022    RDW 19 8 (H) 08/03/2022    MPV 11 6 08/03/2022    NRBC 1 08/03/2022   , CMP:   Lab Results   Component Value Date    SODIUM 148 (H) 08/03/2022    K 4 1 08/03/2022     (H) 08/03/2022    CO2 18 (L) 08/03/2022    BUN 82 (H) 08/03/2022    CREATININE 5 02 (H) 08/03/2022    CALCIUM 8 1 (L) 08/03/2022    AST 14 08/03/2022    ALT 17 08/03/2022    ALKPHOS 90 08/03/2022    EGFR 10 08/03/2022     VTE Pharmacologic Prophylaxis: none- anemia  VTE Mechanical Prophylaxis: sequential compression device

## 2022-08-03 NOTE — ASSESSMENT & PLAN NOTE
Patient is 24-year-old female with an extensive medical history who has nausea, vomiting and diarrhea with decreased p o  Intake over the last 4 days prior to admission  · CT scan - acute sigmoid diverticulitis with intrahepatic and extrahepatic portal venous air is standing for ischemic bowel no pneumatosis  · Surgery and GI on board  · Received 2 L of IV bolus in the ED  · Continue on IV bicarb drip  · On IV Zosyn  · Stool studies and C diff pending  · Pain management p r n  · Antiemetics p r n    · As per surgery no surgical intervention needed at this time  · GI recommends bowel rest and IV fluids  · Trend WBC and fever curve

## 2022-08-04 ENCOUNTER — APPOINTMENT (INPATIENT)
Dept: RADIOLOGY | Facility: HOSPITAL | Age: 72
DRG: 391 | End: 2022-08-04
Payer: COMMERCIAL

## 2022-08-04 LAB
2HR DELTA HS TROPONIN: -53 NG/L
4HR DELTA HS TROPONIN: -138 NG/L
ANION GAP SERPL CALCULATED.3IONS-SCNC: 10 MMOL/L (ref 4–13)
ATRIAL RATE: 46 BPM
ATRIAL RATE: 89 BPM
BASOPHILS # BLD AUTO: 0.02 THOUSANDS/ΜL (ref 0–0.1)
BASOPHILS NFR BLD AUTO: 0 % (ref 0–1)
BUN SERPL-MCNC: 75 MG/DL (ref 5–25)
C DIFF TOX GENS STL QL NAA+PROBE: NEGATIVE
CALCIUM SERPL-MCNC: 8.2 MG/DL (ref 8.3–10.1)
CAMPYLOBACTER DNA SPEC NAA+PROBE: NORMAL
CARDIAC TROPONIN I PNL SERPL HS: 336 NG/L
CARDIAC TROPONIN I PNL SERPL HS: 421 NG/L
CARDIAC TROPONIN I PNL SERPL HS: 474 NG/L
CHLORIDE SERPL-SCNC: 109 MMOL/L (ref 96–108)
CO2 SERPL-SCNC: 26 MMOL/L (ref 21–32)
CREAT SERPL-MCNC: 4.72 MG/DL (ref 0.6–1.3)
EOSINOPHIL # BLD AUTO: 0.15 THOUSAND/ΜL (ref 0–0.61)
EOSINOPHIL NFR BLD AUTO: 2 % (ref 0–6)
ERYTHROCYTE [DISTWIDTH] IN BLOOD BY AUTOMATED COUNT: 19.4 % (ref 11.6–15.1)
FERRITIN SERPL-MCNC: 165 NG/ML (ref 8–388)
GFR SERPL CREATININE-BSD FRML MDRD: 11 ML/MIN/1.73SQ M
GLUCOSE SERPL-MCNC: 127 MG/DL (ref 65–140)
GLUCOSE SERPL-MCNC: 145 MG/DL (ref 65–140)
GLUCOSE SERPL-MCNC: 151 MG/DL (ref 65–140)
GLUCOSE SERPL-MCNC: 151 MG/DL (ref 65–140)
GLUCOSE SERPL-MCNC: 171 MG/DL (ref 65–140)
GLUCOSE SERPL-MCNC: 191 MG/DL (ref 65–140)
GLUCOSE SERPL-MCNC: 194 MG/DL (ref 65–140)
HCT VFR BLD AUTO: 21.5 % (ref 36.5–49.3)
HCT VFR BLD AUTO: 21.8 % (ref 36.5–49.3)
HCT VFR BLD AUTO: 22.1 % (ref 36.5–49.3)
HCT VFR BLD AUTO: 22.2 % (ref 36.5–49.3)
HGB BLD-MCNC: 6.9 G/DL (ref 12–17)
HGB BLD-MCNC: 7.1 G/DL (ref 12–17)
IMM GRANULOCYTES # BLD AUTO: 0.09 THOUSAND/UL (ref 0–0.2)
IMM GRANULOCYTES NFR BLD AUTO: 1 % (ref 0–2)
INR PPP: 4.64 (ref 0.84–1.19)
IRON SATN MFR SERPL: 8 % (ref 20–50)
IRON SERPL-MCNC: 17 UG/DL (ref 65–175)
LYMPHOCYTES # BLD AUTO: 0.74 THOUSANDS/ΜL (ref 0.6–4.47)
LYMPHOCYTES NFR BLD AUTO: 9 % (ref 14–44)
MCH RBC QN AUTO: 26.7 PG (ref 26.8–34.3)
MCHC RBC AUTO-ENTMCNC: 32.6 G/DL (ref 31.4–37.4)
MCV RBC AUTO: 82 FL (ref 82–98)
MONOCYTES # BLD AUTO: 0.64 THOUSAND/ΜL (ref 0.17–1.22)
MONOCYTES NFR BLD AUTO: 8 % (ref 4–12)
NEUTROPHILS # BLD AUTO: 6.3 THOUSANDS/ΜL (ref 1.85–7.62)
NEUTS SEG NFR BLD AUTO: 80 % (ref 43–75)
NRBC BLD AUTO-RTO: 1 /100 WBCS
PHOSPHATE SERPL-MCNC: 4.8 MG/DL (ref 2.3–4.1)
PLATELET # BLD AUTO: 169 THOUSANDS/UL (ref 149–390)
PMV BLD AUTO: 10.9 FL (ref 8.9–12.7)
POTASSIUM SERPL-SCNC: 3.5 MMOL/L (ref 3.5–5.3)
PROTHROMBIN TIME: 45.7 SECONDS (ref 11.6–14.5)
QRS AXIS: 108 DEGREES
QRS AXIS: 113 DEGREES
QRSD INTERVAL: 130 MS
QRSD INTERVAL: 160 MS
QT INTERVAL: 442 MS
QT INTERVAL: 470 MS
QTC INTERVAL: 452 MS
QTC INTERVAL: 521 MS
RBC # BLD AUTO: 2.66 MILLION/UL (ref 3.88–5.62)
SALMONELLA DNA SPEC QL NAA+PROBE: NORMAL
SHIGA TOXIN STX GENE SPEC NAA+PROBE: NORMAL
SHIGELLA DNA SPEC QL NAA+PROBE: NORMAL
SODIUM SERPL-SCNC: 145 MMOL/L (ref 135–147)
T WAVE AXIS: 2 DEGREES
T WAVE AXIS: 30 DEGREES
TIBC SERPL-MCNC: 206 UG/DL (ref 250–450)
VENTRICULAR RATE: 63 BPM
VENTRICULAR RATE: 74 BPM
WBC # BLD AUTO: 7.94 THOUSAND/UL (ref 4.31–10.16)

## 2022-08-04 PROCEDURE — 93010 ELECTROCARDIOGRAM REPORT: CPT | Performed by: INTERNAL MEDICINE

## 2022-08-04 PROCEDURE — 71045 X-RAY EXAM CHEST 1 VIEW: CPT

## 2022-08-04 PROCEDURE — 85025 COMPLETE CBC W/AUTO DIFF WBC: CPT | Performed by: INTERNAL MEDICINE

## 2022-08-04 PROCEDURE — C9113 INJ PANTOPRAZOLE SODIUM, VIA: HCPCS | Performed by: PHYSICIAN ASSISTANT

## 2022-08-04 PROCEDURE — 82272 OCCULT BLD FECES 1-3 TESTS: CPT | Performed by: INTERNAL MEDICINE

## 2022-08-04 PROCEDURE — 82728 ASSAY OF FERRITIN: CPT | Performed by: INTERNAL MEDICINE

## 2022-08-04 PROCEDURE — 84484 ASSAY OF TROPONIN QUANT: CPT | Performed by: PHYSICIAN ASSISTANT

## 2022-08-04 PROCEDURE — 99232 SBSQ HOSP IP/OBS MODERATE 35: CPT | Performed by: INTERNAL MEDICINE

## 2022-08-04 PROCEDURE — 85018 HEMOGLOBIN: CPT | Performed by: INTERNAL MEDICINE

## 2022-08-04 PROCEDURE — 85014 HEMATOCRIT: CPT | Performed by: INTERNAL MEDICINE

## 2022-08-04 PROCEDURE — 85610 PROTHROMBIN TIME: CPT | Performed by: INTERNAL MEDICINE

## 2022-08-04 PROCEDURE — 93005 ELECTROCARDIOGRAM TRACING: CPT

## 2022-08-04 PROCEDURE — 80048 BASIC METABOLIC PNL TOTAL CA: CPT | Performed by: INTERNAL MEDICINE

## 2022-08-04 PROCEDURE — 84100 ASSAY OF PHOSPHORUS: CPT | Performed by: NURSE PRACTITIONER

## 2022-08-04 PROCEDURE — 83540 ASSAY OF IRON: CPT | Performed by: INTERNAL MEDICINE

## 2022-08-04 PROCEDURE — 82948 REAGENT STRIP/BLOOD GLUCOSE: CPT

## 2022-08-04 PROCEDURE — 83550 IRON BINDING TEST: CPT | Performed by: INTERNAL MEDICINE

## 2022-08-04 RX ORDER — METOPROLOL SUCCINATE 25 MG/1
25 TABLET, EXTENDED RELEASE ORAL DAILY
Status: DISCONTINUED | OUTPATIENT
Start: 2022-08-04 | End: 2022-08-04

## 2022-08-04 RX ORDER — CEFTRIAXONE 2 G/50ML
2000 INJECTION, SOLUTION INTRAVENOUS EVERY 24 HOURS
Status: DISCONTINUED | OUTPATIENT
Start: 2022-08-05 | End: 2022-08-09

## 2022-08-04 RX ORDER — METRONIDAZOLE 500 MG/100ML
500 INJECTION, SOLUTION INTRAVENOUS EVERY 8 HOURS
Status: DISCONTINUED | OUTPATIENT
Start: 2022-08-04 | End: 2022-08-09

## 2022-08-04 RX ORDER — CEFTRIAXONE 1 G/50ML
1000 INJECTION, SOLUTION INTRAVENOUS EVERY 24 HOURS
Status: DISCONTINUED | OUTPATIENT
Start: 2022-08-04 | End: 2022-08-04

## 2022-08-04 RX ORDER — SODIUM CHLORIDE, SODIUM GLUCONATE, SODIUM ACETATE, POTASSIUM CHLORIDE, MAGNESIUM CHLORIDE, SODIUM PHOSPHATE, DIBASIC, AND POTASSIUM PHOSPHATE .53; .5; .37; .037; .03; .012; .00082 G/100ML; G/100ML; G/100ML; G/100ML; G/100ML; G/100ML; G/100ML
75 INJECTION, SOLUTION INTRAVENOUS CONTINUOUS
Status: DISCONTINUED | OUTPATIENT
Start: 2022-08-04 | End: 2022-08-05

## 2022-08-04 RX ADMIN — METRONIDAZOLE 500 MG: 500 INJECTION, SOLUTION INTRAVENOUS at 17:08

## 2022-08-04 RX ADMIN — FLUTICASONE FUROATE AND VILANTEROL TRIFENATATE 1 PUFF: 200; 25 POWDER RESPIRATORY (INHALATION) at 08:42

## 2022-08-04 RX ADMIN — METRONIDAZOLE 500 MG: 500 INJECTION, SOLUTION INTRAVENOUS at 23:16

## 2022-08-04 RX ADMIN — INSULIN LISPRO 2 UNITS: 100 INJECTION, SOLUTION INTRAVENOUS; SUBCUTANEOUS at 11:41

## 2022-08-04 RX ADMIN — CEFTRIAXONE 1000 MG: 1 INJECTION, SOLUTION INTRAVENOUS at 08:41

## 2022-08-04 RX ADMIN — PIPERACILLIN SODIUM AND TAZOBACTAM SODIUM 3.38 G: 3; .375 INJECTION, POWDER, LYOPHILIZED, FOR SOLUTION INTRAVENOUS at 05:55

## 2022-08-04 RX ADMIN — FLUTICASONE PROPIONATE 1 SPRAY: 50 SPRAY, METERED NASAL at 17:07

## 2022-08-04 RX ADMIN — METOPROLOL SUCCINATE 25 MG: 25 TABLET, FILM COATED, EXTENDED RELEASE ORAL at 09:53

## 2022-08-04 RX ADMIN — FLUTICASONE PROPIONATE 1 SPRAY: 50 SPRAY, METERED NASAL at 08:42

## 2022-08-04 RX ADMIN — SODIUM CHLORIDE, SODIUM GLUCONATE, SODIUM ACETATE, POTASSIUM CHLORIDE, MAGNESIUM CHLORIDE, SODIUM PHOSPHATE, DIBASIC, AND POTASSIUM PHOSPHATE 75 ML/HR: .53; .5; .37; .037; .03; .012; .00082 INJECTION, SOLUTION INTRAVENOUS at 09:49

## 2022-08-04 RX ADMIN — PANTOPRAZOLE SODIUM 40 MG: 40 INJECTION, POWDER, FOR SOLUTION INTRAVENOUS at 08:41

## 2022-08-04 RX ADMIN — INSULIN LISPRO 2 UNITS: 100 INJECTION, SOLUTION INTRAVENOUS; SUBCUTANEOUS at 17:07

## 2022-08-04 RX ADMIN — METRONIDAZOLE 500 MG: 500 INJECTION, SOLUTION INTRAVENOUS at 09:47

## 2022-08-04 NOTE — PROGRESS NOTES
NEPHROLOGY PROGRESS NOTE   Marina Aleman 73 y o  male MRN: [id number]  Unit/Bed#: -01 SDU Encounter: [id number]  Reason for Consult: DORA (POA) on CKD IIIB/IV    70-year-old male with history of CKD stage IIIB/4, diabetes, CHF, multiple myeloma on chemotherapy, COPD, atrial fibrillation, and obesity, who presents from nursing facility with nausea, vomiting, diarrhea, and decreased oral intake  Nephrology was consulted for acute kidney injury  ASSESSMENT/PLAN:  DORA(POA) on CKD IIIB/IV:  Suspect prerenal etiology with volume depletion in the setting of nausea, vomiting, diarrhea, decreased oral intake   -baseline creatinine previously 1 2-1 4 prior to October 2021, more recently high 1s to low 2s   -presents with creatinine of 5 78   -creatinine improved to 4 72   -follows with Dr Norma Garcia   -imaging shows unremarkable kidneys   -bladder scan insignificant   -UA negative for hematuria or proteinuria, 1-2 RBCs, 1-2 WBCs  -continues on maintenance IV fluids, changed from bicarbonate to isolyte @ 75 cc/hr   -continue to avoid nephrotoxins, hypotension, IV contrast   -strict I/O  Currently with Montague catheter   -no urgent indication for RRT at this time      Anion gap acidosis:  In the setting of acute renal failure and increased GI loss  (resolved)  -received 2 L of fluid   -lactic acid level normal   -currently on bicarbonate drip, will discontinue and change fluids to isolate      Hyperkalemia:  Initial potassium level 5 5   On supplements per medication list  (resolved)  -will continue to monitor and treat as needed   Avoid potassium supplementation  -recommend low-potassium diet   -bladder scan negative for obstruction      Hyperphosphatemia:  Suspected due to acute renal failure   -most recent phosphorus level 5 1   -will continue to monitor and trend phosphorus level, continues to improve       Hypernatremia: (stable)  -continues on IVF and advanced to clear liquid diet    -will continue to monitor and trend       Hypertension:  Blood pressure with fluctuation    -home medications:  Metoprolol 25 mg daily, torsemide 40 mg 2 times per day   -may resume metoprolol 25 mg po daily  -would avoid hypotension or high fluctuations in blood pressure      Diverticulitis versus colitis:  Presenting with nausea, vomiting, and diarrhea  -CT scan showed inflammatory changes of sigmoid colon, no free air or abscess   -general surgery team following   Per note, no surgical intervention indicated at this time  -GI team following   -currently on antibiotics  -continues on IV fluids and clear liquid diet      CHF:  Most recent echo with EF of 45%  -chest x-ray with persistent large amount of the cardiomediastinal silhouette   -home diuretic:  Torsemide 40 mg 2 times per day  -currently holding diuretics, may give as needed  Last dose 08/03/2022   -check daily weights, follow fluid restriction, strict I/O      Multiple myeloma:  Receiving treatment with chemotherapy, last infusion 07/26/2022      Atrial fibrillation:  -maintained on beta blocker   Coumadin currently on hold      Anemia:  In the setting of multiple myeloma   Following outpatient with Hematology  -iron panel pending  Would avoid IV Venofer while on antibiotics  -continue to monitor and transfuse as needed for hemoglobin less than 7 0      Other:  COPD, obesity, GERD, NALLELY  Disposition:  Requiring additional stay due to medical needs  SUBJECTIVE:  The patient is resting in his bed  He appears be comfortable  He is requesting a phone   He continues to be confused  He denies chest discomfort or shortness of breath      OBJECTIVE:  Current Weight: Weight - Scale: 132 kg (291 lb 7 2 oz)  Vitals:    08/04/22 0556 08/04/22 0600 08/04/22 0751 08/04/22 0800   BP:  151/67 130/69 149/66   BP Location:   Left arm    Pulse:  75 70 63   Resp:  19 20 17   Temp:   97 5 °F (36 4 °C)    TempSrc:   Oral    SpO2:  98% 98% 98%   Weight: 132 kg (291 lb 7 2 oz) Height:           Intake/Output Summary (Last 24 hours) at 8/4/2022 7267  Last data filed at 8/4/2022 4838  Gross per 24 hour   Intake 4551 58 ml   Output 1730 ml   Net 2821 58 ml     General: NAD  Skin: warm, dry, intact, no rash  HEENT: Moist mucous membranes, sclera anicteric, normocephalic, atraumatic  Neck: No apparent JVD appreciated  Chest: lung sounds clear B/L, on RA   CVS:Regular rate and rhythm, no murmer   Abdomen: Soft, round, non-tender, +BS, Montague catheter present, obese abdomen  Extremities: B/L LE edema present, left AKA  Neuro: alert and oriented x 1, confused  Psych: appropriate mood and affect     Medications:    Current Facility-Administered Medications:     acetaminophen (TYLENOL) tablet 650 mg, 650 mg, Oral, Q6H PRN, Casi Serrano MD, 650 mg at 08/03/22 2220    cefTRIAXone (ROCEPHIN) IVPB (premix in dextrose) 1,000 mg 50 mL, 1,000 mg, Intravenous, Q24H, Casi Serrano MD, Last Rate: 100 mL/hr at 08/04/22 0841, 1,000 mg at 08/04/22 0841    fluticasone (FLONASE) 50 mcg/act nasal spray 1 spray, 1 spray, Each Nare, BID, Julia Parr PA-C, 1 spray at 08/04/22 0842    fluticasone-vilanterol (BREO ELLIPTA) 200-25 MCG/INH inhaler 1 puff, 1 puff, Inhalation, Daily, Julia Parr PA-C, 1 puff at 08/04/22 0842    insulin lispro (HumaLOG) 100 units/mL subcutaneous injection 2-12 Units, 2-12 Units, Subcutaneous, TID AC, 4 Units at 08/03/22 1740 **AND** Fingerstick Glucose (POCT), , , TID AC, Casi Serrano MD    metroNIDAZOLE (FLAGYL) IVPB (premix) 500 mg 100 mL, 500 mg, Intravenous, Q8H, Casi Serrano MD    ondansetron (ZOFRAN) injection 4 mg, 4 mg, Intravenous, Q6H PRN, Casi Serrano MD    pantoprazole (PROTONIX) injection 40 mg, 40 mg, Intravenous, Q24H JULIEN, Julia Parr PA-C, 40 mg at 08/04/22 0841    sodium bicarbonate 75 mEq in dextrose 5 % 1,000 mL infusion, 125 mL/hr, Intravenous, Continuous, Beau Chew MD, Last Rate: 125 mL/hr at 08/04/22 0000, 125 mL/hr at 08/04/22 0000    Laboratory Results:  Results from last 7 days   Lab Units 08/04/22  0546 08/04/22  0211 08/03/22  1443 08/03/22  0512 08/02/22  1622 08/02/22  0951 08/02/22  0423 08/02/22  0109 08/01/22  2342   WBC Thousand/uL 7 94  --   --  9 29  --   --  8 92 10 26*  --    HEMOGLOBIN g/dL 7 1*  7 1* 6 9* 7 3* 7 3*   < >  --  7 5* 8 1*  --    I STAT HEMOGLOBIN g/dl  --   --   --   --   --   --   --   --  7 5*   HEMATOCRIT % 21 8*  22 1* 21 5* 22 5* 22 8*   < >  --  24 4* 26 8*  --    HEMATOCRIT, ISTAT %  --   --   --   --   --   --   --   --  22*   PLATELETS Thousands/uL 169  --   --  165  --   --  132* 146*  --    SODIUM mmol/L 145  --  146 148*   < > 145 144 143  --    POTASSIUM mmol/L 3 5  --  3 7 4 1   < > 4 8 5 1 5 4*  --    CHLORIDE mmol/L 109*  --  110* 112*   < > 116* 116* 117*  --    CO2 mmol/L 26  --  23 18*   < > 14* 14* 14*  --    CO2, I-STAT mmol/L  --   --   --   --   --   --   --   --  11*   BUN mg/dL 75*  --  82* 82*   < > 91* 89* 92*  --    CREATININE mg/dL 4 72*  --  5 08* 5 02*   < > 5 38* 5 32* 5 44*  --    CALCIUM mg/dL 8 2*  --  8 0* 8 1*   < > 8 2* 8 2* 8 0*  --    MAGNESIUM mg/dL  --   --   --  1 9  --   --  1 9 1 7  --    PHOSPHORUS mg/dL 4 8*  --   --   --   --   --  5 1* 5 5*  --    ALK PHOS U/L  --   --   --  90  --  100 101 101  --    ALT U/L  --   --   --  17  --  21 21 17  --    AST U/L  --   --   --  14  --  18 14 13  --    GLUCOSE, ISTAT mg/dl  --   --   --   --   --   --   --   --  64*    < > = values in this interval not displayed

## 2022-08-04 NOTE — ASSESSMENT & PLAN NOTE
Lab Results   Component Value Date    EGFR 11 08/04/2022    EGFR 10 08/03/2022    EGFR 10 08/03/2022    CREATININE 4 72 (H) 08/04/2022    CREATININE 5 08 (H) 08/03/2022    CREATININE 5 02 (H) 08/03/2022   See above

## 2022-08-04 NOTE — ASSESSMENT & PLAN NOTE
Patient with DORA on CKD stage IV in the setting of nausea, vomiting and diarrhea and poor oral intake  Patient admitted with Creatinine elevated at 5 78   Creatinine this morning is 4 72  Most likely due to diarrhea and vomiting  Baseline creatinine in low 2s  Continue on bicarb drip  Nephrology on board  Avoid hypotension/nephrotoxins medication/NSAIDs  Trend BMP

## 2022-08-04 NOTE — PLAN OF CARE
Problem: MOBILITY - ADULT  Goal: Maintain or return to baseline ADL function  Description: INTERVENTIONS:  -  Assess patient's ability to carry out ADLs; assess patient's baseline for ADL function and identify physical deficits which impact ability to perform ADLs (bathing, care of mouth/teeth, toileting, grooming, dressing, etc )  - Assess/evaluate cause of self-care deficits   - Assess range of motion  - Assess patient's mobility; develop plan if impaired  - Assess patient's need for assistive devices and provide as appropriate  - Encourage maximum independence but intervene and supervise when necessary  - Involve family in performance of ADLs  - Assess for home care needs following discharge   - Consider OT consult to assist with ADL evaluation and planning for discharge  - Provide patient education as appropriate  Outcome: Progressing  Goal: Maintains/Returns to pre admission functional level  Description: INTERVENTIONS:  - Perform BMAT or MOVE assessment daily    - Set and communicate daily mobility goal to care team and patient/family/caregiver  - Collaborate with rehabilitation services on mobility goals if consulted  - Perform Range of Motion 4 times a day  - Reposition patient every 2 hours    - Record patient progress and toleration of activity level   Outcome: Progressing     Problem: Prexisting or High Potential for Compromised Skin Integrity  Goal: Skin integrity is maintained or improved  Description: INTERVENTIONS:  - Identify patients at risk for skin breakdown  - Assess and monitor skin integrity  - Assess and monitor nutrition and hydration status  - Monitor labs   - Assess for incontinence   - Turn and reposition patient  - Assist with mobility/ambulation  - Relieve pressure over bony prominences  - Avoid friction and shearing  - Provide appropriate hygiene as needed including keeping skin clean and dry  - Evaluate need for skin moisturizer/barrier cream  - Collaborate with interdisciplinary team   - Patient/family teaching  - Consider wound care consult   Outcome: Progressing     Problem: PAIN - ADULT  Goal: Verbalizes/displays adequate comfort level or baseline comfort level  Description: Interventions:  - Encourage patient to monitor pain and request assistance  - Assess pain using appropriate pain scale  - Administer analgesics based on type and severity of pain and evaluate response  - Implement non-pharmacological measures as appropriate and evaluate response  - Consider cultural and social influences on pain and pain management  - Notify physician/advanced practitioner if interventions unsuccessful or patient reports new pain  Outcome: Progressing     Problem: INFECTION - ADULT  Goal: Absence or prevention of progression during hospitalization  Description: INTERVENTIONS:  - Assess and monitor for signs and symptoms of infection  - Monitor lab/diagnostic results  - Monitor all insertion sites, i e  indwelling lines, tubes, and drains  - Monitor endotracheal if appropriate and nasal secretions for changes in amount and color  - [place] appropriate cooling/warming therapies per order  - Administer medications as ordered  - Instruct and encourage patient and family to use good hand hygiene technique  - Identify and instruct in appropriate isolation precautions for identified infection/condition  Outcome: Progressing  Goal: Absence of fever/infection during neutropenic period  Description: INTERVENTIONS:  - Monitor WBC    Outcome: Progressing     Problem: SAFETY ADULT  Goal: Maintain or return to baseline ADL function  Description: INTERVENTIONS:  -  Assess patient's ability to carry out ADLs; assess patient's baseline for ADL function and identify physical deficits which impact ability to perform ADLs (bathing, care of mouth/teeth, toileting, grooming, dressing, etc )  - Assess/evaluate cause of self-care deficits   - Assess range of motion  - Assess patient's mobility; develop plan if impaired  - Assess patient's need for assistive devices and provide as appropriate  - Encourage maximum independence but intervene and supervise when necessary  - Involve family in performance of ADLs  - Assess for home care needs following discharge   - Consider OT consult to assist with ADL evaluation and planning for discharge  - Provide patient education as appropriate  Outcome: Progressing  Goal: Maintains/Returns to pre admission functional level  Description: INTERVENTIONS:  - Perform BMAT or MOVE assessment daily    - Set and communicate daily mobility goal to care team and patient/family/caregiver  - Collaborate with rehabilitation services on mobility goals if consulted  - Perform Range of Motion 4 times a day  - Reposition patient every 2 hours    - Record patient progress and toleration of activity level   Outcome: Progressing  Goal: Patient will remain free of falls  Description: INTERVENTIONS:  - Educate patient/family on patient safety including physical limitations  - Instruct patient to call for assistance with activity   - Consult OT/PT to assist with strengthening/mobility   - Keep Call bell within reach  - Keep bed low and locked with side rails adjusted as appropriate  - Keep care items and personal belongings within reach  - Initiate and maintain comfort rounds  - Make Fall Risk Sign visible to staff  - Offer Toileting every 2 Hours, in advance of need  - Initiate/Maintain bed alarm  - Obtain necessary fall risk management equipment  - Apply yellow socks and bracelet for high fall risk patients  - Consider moving patient to room near nurses station  Outcome: Progressing     Problem: DISCHARGE PLANNING  Goal: Discharge to home or other facility with appropriate resources  Description: INTERVENTIONS:  - Identify barriers to discharge w/patient and caregiver  - Arrange for needed discharge resources and transportation as appropriate  - Identify discharge learning needs (meds, wound care, etc )  - Arrange for interpretive services to assist at discharge as needed  - Refer to Case Management Department for coordinating discharge planning if the patient needs post-hospital services based on physician/advanced practitioner order or complex needs related to functional status, cognitive ability, or social support system  Outcome: Progressing     Problem: Knowledge Deficit  Goal: Patient/family/caregiver demonstrates understanding of disease process, treatment plan, medications, and discharge instructions  Description: Complete learning assessment and assess knowledge base    Interventions:  - Provide teaching at level of understanding  - Provide teaching via preferred learning methods  Outcome: Progressing     Problem: Potential for Falls  Goal: Patient will remain free of falls  Description: INTERVENTIONS:  - Educate patient/family on patient safety including physical limitations  - Instruct patient to call for assistance with activity   - Consult OT/PT to assist with strengthening/mobility   - Keep Call bell within reach  - Keep bed low and locked with side rails adjusted as appropriate  - Keep care items and personal belongings within reach  - Initiate and maintain comfort rounds  - Make Fall Risk Sign visible to staff  - Offer Toileting every 2 Hours, in advance of need  - Initiate/Maintain bed alarm  - Obtain necessary fall risk management equipment  - Apply yellow socks and bracelet for high fall risk patients  - Consider moving patient to room near nurses station  Outcome: Progressing

## 2022-08-04 NOTE — PLAN OF CARE
Problem: MOBILITY - ADULT  Goal: Maintain or return to baseline ADL function  Description: INTERVENTIONS:  -  Assess patient's ability to carry out ADLs; assess patient's baseline for ADL function and identify physical deficits which impact ability to perform ADLs (bathing, care of mouth/teeth, toileting, grooming, dressing, etc )  - Assess/evaluate cause of self-care deficits   - Assess range of motion  - Assess patient's mobility; develop plan if impaired  - Assess patient's need for assistive devices and provide as appropriate  - Encourage maximum independence but intervene and supervise when necessary  - Involve family in performance of ADLs  - Assess for home care needs following discharge   - Consider OT consult to assist with ADL evaluation and planning for discharge  - Provide patient education as appropriate  Outcome: Progressing  Goal: Maintains/Returns to pre admission functional level  Description: INTERVENTIONS:  - Perform BMAT or MOVE assessment daily    - Set and communicate daily mobility goal to care team and patient/family/caregiver     - Collaborate with rehabilitation services on mobility goals if consulted    - Out of bed for toileting  - Record patient progress and toleration of activity level   Outcome: Progressing     Problem: Prexisting or High Potential for Compromised Skin Integrity  Goal: Skin integrity is maintained or improved  Description: INTERVENTIONS:  - Identify patients at risk for skin breakdown  - Assess and monitor skin integrity  - Assess and monitor nutrition and hydration status  - Monitor labs   - Assess for incontinence   - Turn and reposition patient  - Assist with mobility/ambulation  - Relieve pressure over bony prominences  - Avoid friction and shearing  - Provide appropriate hygiene as needed including keeping skin clean and dry  - Evaluate need for skin moisturizer/barrier cream  - Collaborate with interdisciplinary team   - Patient/family teaching  - Consider wound care consult   Outcome: Progressing     Problem: PAIN - ADULT  Goal: Verbalizes/displays adequate comfort level or baseline comfort level  Description: Interventions:  - Encourage patient to monitor pain and request assistance  - Assess pain using appropriate pain scale  - Administer analgesics based on type and severity of pain and evaluate response  - Implement non-pharmacological measures as appropriate and evaluate response  - Consider cultural and social influences on pain and pain management  - Notify physician/advanced practitioner if interventions unsuccessful or patient reports new pain  Outcome: Progressing     Problem: INFECTION - ADULT  Goal: Absence or prevention of progression during hospitalization  Description: INTERVENTIONS:  - Assess and monitor for signs and symptoms of infection  - Monitor lab/diagnostic results  - Monitor all insertion sites, i e  indwelling lines, tubes, and drains  - Monitor endotracheal if appropriate and nasal secretions for changes in amount and color  - [place] appropriate cooling/warming therapies per order  - Administer medications as ordered  - Instruct and encourage patient and family to use good hand hygiene technique  - Identify and instruct in appropriate isolation precautions for identified infection/condition  Outcome: Progressing  Goal: Absence of fever/infection during neutropenic period  Description: INTERVENTIONS:  - Monitor WBC    Outcome: Progressing     Problem: SAFETY ADULT  Goal: Maintain or return to baseline ADL function  Description: INTERVENTIONS:  -  Assess patient's ability to carry out ADLs; assess patient's baseline for ADL function and identify physical deficits which impact ability to perform ADLs (bathing, care of mouth/teeth, toileting, grooming, dressing, etc )  - Assess/evaluate cause of self-care deficits   - Assess range of motion  - Assess patient's mobility; develop plan if impaired  - Assess patient's need for assistive devices and provide as appropriate  - Encourage maximum independence but intervene and supervise when necessary  - Involve family in performance of ADLs  - Assess for home care needs following discharge   - Consider OT consult to assist with ADL evaluation and planning for discharge  - Provide patient education as appropriate  Outcome: Progressing  Goal: Maintains/Returns to pre admission functional level  Description: INTERVENTIONS:  - Perform BMAT or MOVE assessment daily    - Set and communicate daily mobility goal to care team and patient/family/caregiver     - Collaborate with rehabilitation services on mobility goals if consulted    - Out of bed for toileting  - Record patient progress and toleration of activity level   Outcome: Progressing  Goal: Patient will remain free of falls  Description: INTERVENTIONS:  - Educate patient/family on patient safety including physical limitations  - Instruct patient to call for assistance with activity   - Consult OT/PT to assist with strengthening/mobility   - Keep Call bell within reach  - Keep bed low and locked with side rails adjusted as appropriate  - Keep care items and personal belongings within reach  - Initiate and maintain comfort rounds  - Make Fall Risk Sign visible to staff    - Apply yellow socks and bracelet for high fall risk patients  - Consider moving patient to room near nurses station  Outcome: Progressing     Problem: DISCHARGE PLANNING  Goal: Discharge to home or other facility with appropriate resources  Description: INTERVENTIONS:  - Identify barriers to discharge w/patient and caregiver  - Arrange for needed discharge resources and transportation as appropriate  - Identify discharge learning needs (meds, wound care, etc )  - Arrange for interpretive services to assist at discharge as needed  - Refer to Case Management Department for coordinating discharge planning if the patient needs post-hospital services based on physician/advanced practitioner order or complex needs related to functional status, cognitive ability, or social support system  Outcome: Progressing     Problem: Knowledge Deficit  Goal: Patient/family/caregiver demonstrates understanding of disease process, treatment plan, medications, and discharge instructions  Description: Complete learning assessment and assess knowledge base    Interventions:  - Provide teaching at level of understanding  - Provide teaching via preferred learning methods  Outcome: Progressing     Problem: Potential for Falls  Goal: Patient will remain free of falls  Description: INTERVENTIONS:  - Educate patient/family on patient safety including physical limitations  - Instruct patient to call for assistance with activity   - Consult OT/PT to assist with strengthening/mobility   - Keep Call bell within reach  - Keep bed low and locked with side rails adjusted as appropriate  - Keep care items and personal belongings within reach  - Initiate and maintain comfort rounds  - Make Fall Risk Sign visible to staff    - Apply yellow socks and bracelet for high fall risk patients  - Consider moving patient to room near nurses station  Outcome: Progressing

## 2022-08-04 NOTE — PROGRESS NOTES
[place]  Progress Note - Jaydon Bender 1950, 73 y o  male MRN: [id number]  Unit/Bed#: -01 SDU Encounter: [id number]  Primary Care Provider: Alice Vidales MD   Date and time admitted to hospital: 8/1/2022  8:27 PM    Hyperkalemia  Assessment & Plan  Admitted with potassium of 5 5  Resolved    Stage 3b chronic kidney disease [facility])  Assessment & Plan  Lab Results   Component Value Date    EGFR 11 08/04/2022    EGFR 10 08/03/2022    EGFR 10 08/03/2022    CREATININE 4 72 (H) 08/04/2022    CREATININE 5 08 (H) 08/03/2022    CREATININE 5 02 (H) 08/03/2022   See above    Anemia  Assessment & Plan  Patient has history of anemia in setting of CKD/multiple myeloma  Hemoglobin this morning is 7 1  Iron panel  GI on board  Stool for occult blood  Trend H&H q 8 hours and transfuse if hemoglobin less than 7      Ambulatory dysfunction  Assessment & Plan  P T /OT consult  Has history of left AKA    Chronic obstructive pulmonary disease, unspecified ([facility] [address] )  Assessment & Plan  Continue Flonase and use Breo instead of Advair   Stable at this time    Multiple myeloma [facility])  Assessment & Plan  Last chemo infusion was on July 26  Continue outpatient follow-up    DORA (acute kidney injury) [facility])  Assessment & Plan  Patient with DORA on CKD stage IV in the setting of nausea, vomiting and diarrhea and poor oral intake  Patient admitted with Creatinine elevated at 5 78   Creatinine this morning is 4 72  Most likely due to diarrhea and vomiting  Baseline creatinine in low 2s  Continue on bicarb drip  Nephrology on board  Avoid hypotension/nephrotoxins medication/NSAIDs  Trend BMP    NALLELY (obstructive sleep apnea)  Assessment & Plan  History of obstructive sleep apnea with noncompliance with CPAP    GERD (gastroesophageal reflux disease)  Assessment & Plan  On Protonix    Morbid obesity ([facility] [address] )  Assessment & Plan  Encourage weight loss and lifestyle modification    Chronic atrial fibrillation [facility])  Assessment & Plan  Patient is on Toprol-XL and Coumadin at home  Hold for now  Continue to hold Coumadin as INR is supratherapeutic  Monitor PT INR    * Acute diverticulitis  Assessment & Plan  Patient is 68-year-old female with an extensive medical history who has nausea, vomiting and diarrhea with decreased p o  Intake over the last 4 days prior to admission  · CT scan - acute sigmoid diverticulitis with intrahepatic and extrahepatic portal venous air is standing for ischemic bowel no pneumatosis  · Surgery and GI on board  · Received 2 L of IV bolus in the ED  · Continue on IV bicarb drip  · Will switch to Rocephin and Flagyl  · Stool for C diff is negative  Stool cultures are pending  · Pain management p r n  · Antiemetics p r n  · As per surgery no surgical intervention needed at this time  · GI on board  · Trend WBC and fever curve          Labs & Imaging: I have personally reviewed pertinent reports  VTE Prophylaxis: in place  Code Status:   Level 1 - Full Code    Patient Centered Rounds: I have performed bedside rounds with nursing staff today  Discussions with Specialists or Other Care Team Provider:  GI    Education and Discussions with Family / Patient:  Left voicemail for son    Current Length of Stay: 3 day(s)    Current Patient Status: Inpatient   Certification Statement: The patient will continue to require additional inpatient hospital stay due to see my assessment and plan  Subjective:   Patient is seen and examined at bedside  Has abdominal pain and some nausea  Denies any diarrhea  Afebrile  All other ROS are negative  Objective:    Vitals: Blood pressure 129/71, pulse 73, temperature 97 5 °F (36 4 °C), temperature source Oral, resp  rate 17, height 6' (1 829 m), weight 132 kg (291 lb 7 2 oz), SpO2 98 %  ,Body mass index is 39 53 kg/m²    SPO2 RA Rest    Flowsheet Row ED to Hosp-Admission (Current) from 8/1/2022 in [address] Intensive [facility] Unit   SpO2 98 %   SpO2 Activity At Rest   O2 Device None (Room air)   O2 Flow Rate --        I&O:     Intake/Output Summary (Last 24 hours) at 8/4/2022 1017  Last data filed at 8/4/2022 0947  Gross per 24 hour   Intake 4601 58 ml   Output 1730 ml   Net 2871 58 ml       Physical Exam:    General- Alert, lying comfortably in bed  Not in any acute distress  Neck- Supple, No JVD  CVS- regular, S1 and S2 normal,  Chest- Bilateral Air entry, No rhochi, crackles or wheezing present  Abdomen- soft, nontender, not distended, no guarding or rigidity, BS+  Extremities-  No pedal edema, No calf tenderness  Left AKA  CNS-   Alert, awake  No focal deficits present      Invasive Devices  Report    Peripheral Intravenous Line  Duration           Peripheral IV 08/01/22 Right Antecubital 2 days    Peripheral IV 08/03/22 Right Forearm 1 day          Drain  Duration           Urethral Catheter Non-latex 16 Fr  <1 day                      Social History  reviewed  Family History   Problem Relation Age of Onset    Other Mother         CARDIAC DISORDER     Diabetes Mother     Cancer Father     Other Family         CARDIAC DISORDER     Diabetes Family     Cancer Family     Mental illness Family     Kidney disease Sister     Diabetes Sister     reviewed    Meds:  Current Facility-Administered Medications   Medication Dose Route Frequency Provider Last Rate Last Admin    acetaminophen (TYLENOL) tablet 650 mg  650 mg Oral Q6H PRN Jose Payton MD   650 mg at 08/03/22 2220    cefTRIAXone (ROCEPHIN) IVPB (premix in dextrose) 1,000 mg 50 mL  1,000 mg Intravenous Q24H Jose Payton MD   Stopped at 08/04/22 0947    fluticasone (FLONASE) 50 mcg/act nasal spray 1 spray  1 spray Each Nare BID Julia Parr PA-C   1 spray at 08/04/22 0842    fluticasone-vilanterol (BREO ELLIPTA) 200-25 MCG/INH inhaler 1 puff  1 puff Inhalation Daily Julia Parr PA-C   1 puff at 08/04/22 0842    insulin lispro (HumaLOG) 100 units/mL subcutaneous injection 2-12 Units  2-12 Units Subcutaneous TID AC Idania Chen MD   4 Units at 08/03/22 1740    metoprolol succinate (TOPROL-XL) 24 hr tablet 25 mg  25 mg Oral Daily Nikita Orange, CRNP   25 mg at 08/04/22 0953    metroNIDAZOLE (FLAGYL) IVPB (premix) 500 mg 100 mL  500 mg Intravenous Q8H Idania Chen  mL/hr at 08/04/22 0947 500 mg at 08/04/22 0947    multi-electrolyte (PLASMALYTE-A/ISOLYTE-S PH 7 4) IV solution  75 mL/hr Intravenous Continuous Nikita Orange, CRNP 75 mL/hr at 08/04/22 0949 75 mL/hr at 08/04/22 0949    ondansetron (ZOFRAN) injection 4 mg  4 mg Intravenous Q6H PRN Idania Chen MD        pantoprazole (PROTONIX) injection 40 mg  40 mg Intravenous Q24H [address] Julia Parr PA-C   40 mg at 08/04/22 0841      Medications Prior to Admission   Medication    acetaminophen (TYLENOL) 500 mg tablet    albuterol (PROVENTIL HFA,VENTOLIN HFA) 90 mcg/act inhaler    Alcohol Swabs (ALCOHOL PREP) 70 % PADS    allopurinol (ZYLOPRIM) 300 mg tablet    ammonium lactate (LAC-HYDRIN) 12 % lotion    atorvastatin (LIPITOR) 40 mg tablet    BD AUTOSHIELD DUO 30G X 5 MM MISC    BD INSULIN SYRINGE U/F 31G X 5/16" 0 5 ML MISC    BD PEN NEEDLE HILARIO U/F 32G X 4 MM MISC    bisacodyl (DULCOLAX) 10 mg suppository    bisacodyl (DULCOLAX) 5 mg EC tablet    bisacodyl (DULCOLAX) 5 mg EC tablet    bortezomib (VELCADE)    clotrimazole (LOTRIMIN) 1 % cream    colchicine (COLCRYS) 0 6 mg tablet    docusate sodium (COLACE) 100 mg capsule    Easy Touch Safety Lancets 28G MISC    fluticasone (FLONASE) 50 mcg/act nasal spray    fluticasone-salmeterol (ADVAIR DISKUS, WIXELA INHUB) 250-50 mcg/dose inhaler    insulin glargine (Lantus SoloStar) 100 units/mL injection pen    liraglutide (Victoza) injection    loperamide (IMODIUM) 2 mg capsule    magnesium hydroxide (MILK OF MAGNESIA) 400 mg/5 mL oral suspension    Melatonin 5 MG TABS    metFORMIN (GLUCOPHAGE) 1000 MG tablet    metoprolol succinate (TOPROL-XL) 25 mg 24 hr tablet    multivitamin-minerals therapeutic (THERA-M)    NOVOLOG FLEXPEN 100 units/mL SOPN    nystatin (MYCOSTATIN) powder    omeprazole (PriLOSEC) 40 MG capsule    ondansetron (ZOFRAN) 4 mg tablet    polyethylene glycol (GOLYTELY) 4000 mL solution    polyethylene glycol (GOLYTELY) 4000 mL solution    polyethylene glycol (MIRALAX) 17 g packet    potassium chloride (K-DUR,KLOR-CON) 20 mEq tablet    torsemide (DEMADEX) 20 mg tablet    warfarin (COUMADIN) 1 mg tablet    warfarin (COUMADIN) 6 mg tablet       Labs:  Results from last 7 days   Lab Units 08/04/22  0546 08/04/22  0211 08/03/22  1443 08/03/22  0512 08/02/22  1622 08/02/22  0423   WBC Thousand/uL 7 94  --   --  9 29  --  8 92   HEMOGLOBIN g/dL 7 1*  7 1* 6 9* 7 3* 7 3*   < > 7 5*   HEMATOCRIT % 21 8*  22 1* 21 5* 22 5* 22 8*   < > 24 4*   PLATELETS Thousands/uL 169  --   --  165  --  132*   NEUTROS PCT % 80*  --   --  81*  --  84*   LYMPHS PCT % 9*  --   --  9*  --  8*   MONOS PCT % 8  --   --  7  --  5   EOS PCT % 2  --   --  1  --  1    < > = values in this interval not displayed  Results from last 7 days   Lab Units 08/04/22  0546 08/03/22  1443 08/03/22  0512 08/02/22  1622 08/02/22  0951 08/02/22  0423 08/02/22  0109 08/01/22  2342   POTASSIUM mmol/L 3 5 3 7 4 1   < > 4 8 5 1   < >  --    CHLORIDE mmol/L 109* 110* 112*   < > 116* 116*   < >  --    CO2 mmol/L 26 23 18*   < > 14* 14*   < >  --    CO2, I-STAT mmol/L  --   --   --   --   --   --   --  11*   BUN mg/dL 75* 82* 82*   < > 91* 89*   < >  --    CREATININE mg/dL 4 72* 5 08* 5 02*   < > 5 38* 5 32*   < >  --    CALCIUM mg/dL 8 2* 8 0* 8 1*   < > 8 2* 8 2*   < >  --    ALK PHOS U/L  --   --  90  --  100 101   < >  --    ALT U/L  --   --  17  --  21 21   < >  --    AST U/L  --   --  14  --  18 14   < >  --    GLUCOSE, ISTAT mg/dl  --   --   --   --   --   --   --  64*    < > = values in this interval not displayed       Lab Results Component Value Date    TROPONINI 5 19 (H) 06/27/2020    TROPONINI 5 85 (H) 06/27/2020    TROPONINI 6 35 (H) 06/27/2020    CKMB 4 1 06/25/2020    CKTOTAL 271 06/25/2020    CKTOTAL 53 03/23/2018    CKTOTAL 55 10/08/2017     Results from last 7 days   Lab Units 08/04/22  0546 08/03/22  0512 08/02/22  0423   INR  4 64* 3 59* 2 56*     Lab Results   Component Value Date    BLOODCX No Growth After 5 Days  10/04/2021    BLOODCX No Growth After 5 Days  10/04/2021    BLOODCX No Growth After 5 Days  06/25/2020    BLOODCX No Growth After 5 Days  06/25/2020    URINECX >100,000 cfu/ml Escherichia coli (A) 12/05/2019    WOUNDCULT 4+ Growth of Proteus mirabilis (A) 07/25/2019    WOUNDCULT 4+ Growth of  07/25/2019    WOUNDCULT (A) 07/25/2019     4+ Growth of Methicillin Resistant Staphylococcus aureus    WOUNDCULT 1+ Growth of Proteus mirabilis (A) 07/25/2019    WOUNDCULT 4+ Growth of  07/25/2019    SPUTUMCULTUR 4+ Growth of  07/11/2020    SPUTUMCULTUR 1+ Growth of Aspergillus fumigatus (A) 07/11/2020         Imaging:  Results for orders placed during the hospital encounter of 08/01/22    XR chest portable    Narrative  CHEST    INDICATION:   sob  COMPARISON:  Chest radiograph October 7, 2021  Correlation CT abdomen pelvis August 1, 2022    EXAM PERFORMED/VIEWS:  XR CHEST PORTABLE      FINDINGS:    Heart shadow is enlarged but unchanged from prior exam     No focal consolidation, pleural effusion or pneumothorax  Osseous structures appear within normal limits for patient age  Impression  Persistent enlargement of the cardiomediastinal silhouette  No focal consolidation, pleural effusion or pneumothorax  The left basilar opacities evident on the prior CT are obscured by the heart on this portable chest radiograph              Workstation performed: [id number]    Results for orders placed during the hospital encounter of 10/04/21    XR chest pa & lateral    Narrative  CHEST    INDICATION:   follow up lung consolidations  COMPARISON:  CTA chest/abdomen/pelvis 10/4/2021  Chest radiograph 7/8/2020  EXAM PERFORMED/VIEWS:  XR CHEST PA & LATERAL      FINDINGS:    Heart shadow is enlarged but unchanged from prior exam     Again seen is dense left lower lobe/retrocardiac consolidation with ill-defined airspace opacities in the lingula and right lower lobe, similar to recent chest CT  Trace left pleural effusion  No pneumothorax  Osseous structures appear within normal limits for patient age  Impression  Stable left lower lobe/retrocardiac consolidation and lingular and right lower lobe ill-defined airspace opacities  Trace left pleural effusion  Workstation performed: [id number]      Last 24 Hours Medication List:   Current Facility-Administered Medications   Medication Dose Route Frequency Provider Last Rate    acetaminophen  650 mg Oral Q6H PRN Ever Salcedo MD      cefTRIAXone  1,000 mg Intravenous Q24H Ever Salcedo MD Stopped (08/04/22 0947)    fluticasone  1 spray Each Nare BID Julia Parr PA-C      fluticasone-vilanterol  1 puff Inhalation Daily Julia Parr PA-C      insulin lispro  2-12 Units Subcutaneous TID AC Ever Salcedo MD      metoprolol succinate  25 mg Oral Daily Verle Common, CRNP      metroNIDAZOLE  500 mg Intravenous Q8H Ever Salcedo  mg (08/04/22 0947)    multi-electrolyte  75 mL/hr Intravenous Continuous Verle Common, CRNP 75 mL/hr (08/04/22 0949)    ondansetron  4 mg Intravenous Q6H PRN Ever Salcedo MD      pantoprazole  40 mg Intravenous Q24H [address] Julia Parr PA-C          Today, Patient Was Seen By: Ever Salcedo MD    ** Please Note: Dictation voice to text software may have been used in the creation of this document   **

## 2022-08-04 NOTE — ASSESSMENT & PLAN NOTE
Patient has history of anemia in setting of CKD/multiple myeloma  Hemoglobin this morning is 7 1  Iron panel  GI on board  Stool for occult blood  Trend H&H q 8 hours and transfuse if hemoglobin less than 7

## 2022-08-04 NOTE — PROGRESS NOTES
Progress note - Gastroenterology   Sudheer Delgado 73 y o  male MRN: [id number]  Unit/Bed#: -01 SDU Encounter: [id number]    ASSESSMENT and PLAN    1  Acute sigmoid diverticulitis  2  Acute on chronic kidney disease  3  Multiple myeloma on chemotherapy  4  Afib on Coumadin at home, currently on hold as INR is supratherapeutic (INR 4 64 today)  5  Chronic normocytic anemia, mild bleeding noted at mehta site today  No overt GIB  6  Last colonoscopy 7/25/22    - Continue abx for diverticulitis  - no more pain, everton clears, will try adv to full liq diet today  - Pain control prn  - multiple etiologies to be anemic - will monitor for overt bleeding  Transfuse prn    - add on iron panel, and IV iron prn  - trend H&H      Chief Complaint   Patient presents with    Vomiting     +N/V/D starting 7/29  Pt had a colonoscopy 7/25, 7/26 had a chemo tx  Elevated kidney levels on OP bloodwork  SUBJECTIVE/HPI   Pain at mehta site    /68 (BP Location: Left arm)   Pulse 71   Temp 97 8 °F (36 6 °C) (Oral)   Resp 18   Ht 6' (1 829 m)   Wt 132 kg (291 lb 7 2 oz)   SpO2 99%   BMI 39 53 kg/m²     PHYSICALEXAM  General appearance: alert, appears stated age and cooperative  Eyes: JENNIE, EOMI, no scleral icterus   Head: Normocephalic, without obvious abnormality, atraumatic  Lungs: clear to auscultation bilaterally, no c/w/r  Heart: regular rate and rhythm, S1, S2 normal, no murmur, click, rub or gallop  Abdomen: soft, non-tender, non-distended; bowel sounds normal; no masses,  no organomegaly  Extremities: extremities normal, atraumatic, no clubbing or cyanosis   No LE edema  Neurologic: Grossly normal, AAOx3, no asterixis    Lab Results   Component Value Date    GLUCOSE 64 (L) 08/01/2022    CALCIUM 8 2 (L) 08/04/2022     01/15/2018    K 3 5 08/04/2022    CO2 26 08/04/2022     (H) 08/04/2022    BUN 75 (H) 08/04/2022    CREATININE 4 72 (H) 08/04/2022     Lab Results   Component Value Date    WBC 7 94 08/04/2022    HGB 7 1 (L) 08/04/2022    HGB 7 1 (L) 08/04/2022    HCT 21 8 (L) 08/04/2022    HCT 22 1 (L) 08/04/2022    MCV 82 08/04/2022     08/04/2022     Lab Results   Component Value Date    ALT 17 08/03/2022    AST 14 08/03/2022     (H) 11/03/2019    ALKPHOS 90 08/03/2022    BILITOT 0 6 01/15/2018     No results found for: AMYLASE  Lab Results   Component Value Date    LIPASE 338 08/02/2022     Lab Results   Component Value Date    IRON 26 (L) 10/04/2021    TIBC 250 10/04/2021    FERRITIN 131 10/04/2021     Lab Results   Component Value Date    INR 4 64 (H) 08/04/2022

## 2022-08-04 NOTE — ASSESSMENT & PLAN NOTE
Patient is 42-year-old female with an extensive medical history who has nausea, vomiting and diarrhea with decreased p o  Intake over the last 4 days prior to admission  · CT scan - acute sigmoid diverticulitis with intrahepatic and extrahepatic portal venous air is standing for ischemic bowel no pneumatosis  · Surgery and GI on board  · Received 2 L of IV bolus in the ED  · Continue on IV bicarb drip  · Will switch to Rocephin and Flagyl  · Stool for C diff is negative  Stool cultures are pending  · Pain management p r n  · Antiemetics p r n    · As per surgery no surgical intervention needed at this time  · GI on board  · Trend WBC and fever curve

## 2022-08-05 LAB
ANION GAP SERPL CALCULATED.3IONS-SCNC: 12 MMOL/L (ref 4–13)
BASOPHILS # BLD AUTO: 0.02 THOUSANDS/ΜL (ref 0–0.1)
BASOPHILS NFR BLD AUTO: 0 % (ref 0–1)
BUN SERPL-MCNC: 70 MG/DL (ref 5–25)
CALCIUM SERPL-MCNC: 8.1 MG/DL (ref 8.3–10.1)
CHLORIDE SERPL-SCNC: 107 MMOL/L (ref 96–108)
CO2 SERPL-SCNC: 24 MMOL/L (ref 21–32)
CREAT SERPL-MCNC: 4.38 MG/DL (ref 0.6–1.3)
EOSINOPHIL # BLD AUTO: 0.18 THOUSAND/ΜL (ref 0–0.61)
EOSINOPHIL NFR BLD AUTO: 3 % (ref 0–6)
ERYTHROCYTE [DISTWIDTH] IN BLOOD BY AUTOMATED COUNT: 19.4 % (ref 11.6–15.1)
GFR SERPL CREATININE-BSD FRML MDRD: 12 ML/MIN/1.73SQ M
GLUCOSE SERPL-MCNC: 119 MG/DL (ref 65–140)
GLUCOSE SERPL-MCNC: 125 MG/DL (ref 65–140)
GLUCOSE SERPL-MCNC: 156 MG/DL (ref 65–140)
GLUCOSE SERPL-MCNC: 167 MG/DL (ref 65–140)
GLUCOSE SERPL-MCNC: 186 MG/DL (ref 65–140)
HCT VFR BLD AUTO: 19.1 % (ref 36.5–49.3)
HCT VFR BLD AUTO: 20.5 % (ref 36.5–49.3)
HCT VFR BLD AUTO: 21.9 % (ref 36.5–49.3)
HCT VFR BLD AUTO: 22.4 % (ref 36.5–49.3)
HGB BLD-MCNC: 6 G/DL (ref 12–17)
HGB BLD-MCNC: 6.4 G/DL (ref 12–17)
HGB BLD-MCNC: 6.7 G/DL (ref 12–17)
HGB BLD-MCNC: 6.9 G/DL (ref 12–17)
HGB BLD-MCNC: 6.9 G/DL (ref 12–17)
IMM GRANULOCYTES # BLD AUTO: 0.05 THOUSAND/UL (ref 0–0.2)
IMM GRANULOCYTES NFR BLD AUTO: 1 % (ref 0–2)
INR PPP: 5.06 (ref 0.84–1.19)
LYMPHOCYTES # BLD AUTO: 0.91 THOUSANDS/ΜL (ref 0.6–4.47)
LYMPHOCYTES NFR BLD AUTO: 16 % (ref 14–44)
MCH RBC QN AUTO: 26.1 PG (ref 26.8–34.3)
MCHC RBC AUTO-ENTMCNC: 30.8 G/DL (ref 31.4–37.4)
MCV RBC AUTO: 85 FL (ref 82–98)
MONOCYTES # BLD AUTO: 0.52 THOUSAND/ΜL (ref 0.17–1.22)
MONOCYTES NFR BLD AUTO: 9 % (ref 4–12)
NEUTROPHILS # BLD AUTO: 4.18 THOUSANDS/ΜL (ref 1.85–7.62)
NEUTS SEG NFR BLD AUTO: 71 % (ref 43–75)
NRBC BLD AUTO-RTO: 0 /100 WBCS
PLATELET # BLD AUTO: 209 THOUSANDS/UL (ref 149–390)
PMV BLD AUTO: 11.4 FL (ref 8.9–12.7)
POTASSIUM SERPL-SCNC: 3.6 MMOL/L (ref 3.5–5.3)
PROTHROMBIN TIME: 48.8 SECONDS (ref 11.6–14.5)
RBC # BLD AUTO: 2.64 MILLION/UL (ref 3.88–5.62)
SODIUM SERPL-SCNC: 143 MMOL/L (ref 135–147)
WBC # BLD AUTO: 5.86 THOUSAND/UL (ref 4.31–10.16)

## 2022-08-05 PROCEDURE — 80048 BASIC METABOLIC PNL TOTAL CA: CPT | Performed by: INTERNAL MEDICINE

## 2022-08-05 PROCEDURE — 85610 PROTHROMBIN TIME: CPT | Performed by: INTERNAL MEDICINE

## 2022-08-05 PROCEDURE — 85014 HEMATOCRIT: CPT | Performed by: INTERNAL MEDICINE

## 2022-08-05 PROCEDURE — 83036 HEMOGLOBIN GLYCOSYLATED A1C: CPT | Performed by: PHYSICIAN ASSISTANT

## 2022-08-05 PROCEDURE — 85018 HEMOGLOBIN: CPT | Performed by: INTERNAL MEDICINE

## 2022-08-05 PROCEDURE — C9113 INJ PANTOPRAZOLE SODIUM, VIA: HCPCS | Performed by: PHYSICIAN ASSISTANT

## 2022-08-05 PROCEDURE — 85025 COMPLETE CBC W/AUTO DIFF WBC: CPT | Performed by: INTERNAL MEDICINE

## 2022-08-05 PROCEDURE — 82948 REAGENT STRIP/BLOOD GLUCOSE: CPT

## 2022-08-05 PROCEDURE — 99232 SBSQ HOSP IP/OBS MODERATE 35: CPT | Performed by: INTERNAL MEDICINE

## 2022-08-05 PROCEDURE — 85018 HEMOGLOBIN: CPT | Performed by: PHYSICIAN ASSISTANT

## 2022-08-05 PROCEDURE — P9016 RBC LEUKOCYTES REDUCED: HCPCS

## 2022-08-05 PROCEDURE — 30233N1 TRANSFUSION OF NONAUTOLOGOUS RED BLOOD CELLS INTO PERIPHERAL VEIN, PERCUTANEOUS APPROACH: ICD-10-PCS | Performed by: INTERNAL MEDICINE

## 2022-08-05 RX ORDER — FUROSEMIDE 10 MG/ML
80 INJECTION INTRAMUSCULAR; INTRAVENOUS ONCE
Status: COMPLETED | OUTPATIENT
Start: 2022-08-05 | End: 2022-08-05

## 2022-08-05 RX ORDER — LOPERAMIDE HYDROCHLORIDE 2 MG/1
2 CAPSULE ORAL EVERY 2 HOUR PRN
Status: DISCONTINUED | OUTPATIENT
Start: 2022-08-05 | End: 2022-08-10 | Stop reason: HOSPADM

## 2022-08-05 RX ORDER — INSULIN LISPRO 100 [IU]/ML
1-5 INJECTION, SOLUTION INTRAVENOUS; SUBCUTANEOUS
Status: DISCONTINUED | OUTPATIENT
Start: 2022-08-05 | End: 2022-08-10 | Stop reason: HOSPADM

## 2022-08-05 RX ADMIN — Medication 12.5 MG: at 20:40

## 2022-08-05 RX ADMIN — METRONIDAZOLE 500 MG: 500 INJECTION, SOLUTION INTRAVENOUS at 10:29

## 2022-08-05 RX ADMIN — SODIUM CHLORIDE, SODIUM GLUCONATE, SODIUM ACETATE, POTASSIUM CHLORIDE, MAGNESIUM CHLORIDE, SODIUM PHOSPHATE, DIBASIC, AND POTASSIUM PHOSPHATE 75 ML/HR: .53; .5; .37; .037; .03; .012; .00082 INJECTION, SOLUTION INTRAVENOUS at 00:29

## 2022-08-05 RX ADMIN — PANTOPRAZOLE SODIUM 40 MG: 40 INJECTION, POWDER, FOR SOLUTION INTRAVENOUS at 08:26

## 2022-08-05 RX ADMIN — LOPERAMIDE HYDROCHLORIDE 2 MG: 2 CAPSULE ORAL at 15:03

## 2022-08-05 RX ADMIN — CEFTRIAXONE 2000 MG: 2 INJECTION, SOLUTION INTRAVENOUS at 08:26

## 2022-08-05 RX ADMIN — Medication 12.5 MG: at 08:25

## 2022-08-05 RX ADMIN — METRONIDAZOLE 500 MG: 500 INJECTION, SOLUTION INTRAVENOUS at 23:25

## 2022-08-05 RX ADMIN — INSULIN LISPRO 2 UNITS: 100 INJECTION, SOLUTION INTRAVENOUS; SUBCUTANEOUS at 17:07

## 2022-08-05 RX ADMIN — METRONIDAZOLE 500 MG: 500 INJECTION, SOLUTION INTRAVENOUS at 17:08

## 2022-08-05 RX ADMIN — FLUTICASONE FUROATE AND VILANTEROL TRIFENATATE 1 PUFF: 200; 25 POWDER RESPIRATORY (INHALATION) at 08:32

## 2022-08-05 RX ADMIN — FLUTICASONE PROPIONATE 1 SPRAY: 50 SPRAY, METERED NASAL at 08:32

## 2022-08-05 RX ADMIN — INSULIN LISPRO 2 UNITS: 100 INJECTION, SOLUTION INTRAVENOUS; SUBCUTANEOUS at 11:08

## 2022-08-05 RX ADMIN — INSULIN LISPRO 1 UNITS: 100 INJECTION, SOLUTION INTRAVENOUS; SUBCUTANEOUS at 20:43

## 2022-08-05 RX ADMIN — FLUTICASONE PROPIONATE 1 SPRAY: 50 SPRAY, METERED NASAL at 17:07

## 2022-08-05 RX ADMIN — FUROSEMIDE 80 MG: 10 INJECTION, SOLUTION INTRAMUSCULAR; INTRAVENOUS at 15:03

## 2022-08-05 NOTE — ASSESSMENT & PLAN NOTE
Patient has history of anemia in setting of CKD/multiple myeloma  Hemoglobin this morning is 6 9  Iron panel reviewed  GI on board  Stool for occult blood  Will order 1 unit of packed red cell transfusion  On Protonix  Trend H&H q 8 hours and transfuse if hemoglobin less than 7

## 2022-08-05 NOTE — PLAN OF CARE
Problem: MOBILITY - ADULT  Goal: Maintain or return to baseline ADL function  Description: INTERVENTIONS:  -  Assess patient's ability to carry out ADLs; assess patient's baseline for ADL function and identify physical deficits which impact ability to perform ADLs (bathing, care of mouth/teeth, toileting, grooming, dressing, etc )  - Assess/evaluate cause of self-care deficits   - Assess range of motion  - Assess patient's mobility; develop plan if impaired  - Assess patient's need for assistive devices and provide as appropriate  - Encourage maximum independence but intervene and supervise when necessary  - Involve family in performance of ADLs  - Assess for home care needs following discharge   - Consider OT consult to assist with ADL evaluation and planning for discharge  - Provide patient education as appropriate  Outcome: Progressing  Goal: Maintains/Returns to pre admission functional level  Description: INTERVENTIONS:  - Perform BMAT or MOVE assessment daily    - Set and communicate daily mobility goal to care team and patient/family/caregiver     - Collaborate with rehabilitation services on mobility goals if consulted  - Out of bed for toileting  - Record patient progress and toleration of activity level   Outcome: Progressing     Problem: Prexisting or High Potential for Compromised Skin Integrity  Goal: Skin integrity is maintained or improved  Description: INTERVENTIONS:  - Identify patients at risk for skin breakdown  - Assess and monitor skin integrity  - Assess and monitor nutrition and hydration status  - Monitor labs   - Assess for incontinence   - Turn and reposition patient  - Assist with mobility/ambulation  - Relieve pressure over bony prominences  - Avoid friction and shearing  - Provide appropriate hygiene as needed including keeping skin clean and dry  - Evaluate need for skin moisturizer/barrier cream  - Collaborate with interdisciplinary team   - Patient/family teaching  - Consider wound care consult   Outcome: Progressing     Problem: PAIN - ADULT  Goal: Verbalizes/displays adequate comfort level or baseline comfort level  Description: Interventions:  - Encourage patient to monitor pain and request assistance  - Assess pain using appropriate pain scale  - Administer analgesics based on type and severity of pain and evaluate response  - Implement non-pharmacological measures as appropriate and evaluate response  - Consider cultural and social influences on pain and pain management  - Notify physician/advanced practitioner if interventions unsuccessful or patient reports new pain  Outcome: Progressing     Problem: INFECTION - ADULT  Goal: Absence or prevention of progression during hospitalization  Description: INTERVENTIONS:  - Assess and monitor for signs and symptoms of infection  - Monitor lab/diagnostic results  - Monitor all insertion sites, i e  indwelling lines, tubes, and drains  - Monitor endotracheal if appropriate and nasal secretions for changes in amount and color  - [place] appropriate cooling/warming therapies per order  - Administer medications as ordered  - Instruct and encourage patient and family to use good hand hygiene technique  - Identify and instruct in appropriate isolation precautions for identified infection/condition  Outcome: Progressing  Goal: Absence of fever/infection during neutropenic period  Description: INTERVENTIONS:  - Monitor WBC    Outcome: Progressing     Problem: SAFETY ADULT  Goal: Maintain or return to baseline ADL function  Description: INTERVENTIONS:  -  Assess patient's ability to carry out ADLs; assess patient's baseline for ADL function and identify physical deficits which impact ability to perform ADLs (bathing, care of mouth/teeth, toileting, grooming, dressing, etc )  - Assess/evaluate cause of self-care deficits   - Assess range of motion  - Assess patient's mobility; develop plan if impaired  - Assess patient's need for assistive devices and provide as appropriate  - Encourage maximum independence but intervene and supervise when necessary  - Involve family in performance of ADLs  - Assess for home care needs following discharge   - Consider OT consult to assist with ADL evaluation and planning for discharge  - Provide patient education as appropriate  Outcome: Progressing  Goal: Maintains/Returns to pre admission functional level  Description: INTERVENTIONS:  - Perform BMAT or MOVE assessment daily    - Set and communicate daily mobility goal to care team and patient/family/caregiver     - Collaborate with rehabilitation services on mobility goals if consulted  - Out of bed for toileting  - Record patient progress and toleration of activity level   Outcome: Progressing  Goal: Patient will remain free of falls  Description: INTERVENTIONS:  - Educate patient/family on patient safety including physical limitations  - Instruct patient to call for assistance with activity   - Consult OT/PT to assist with strengthening/mobility   - Keep Call bell within reach  - Keep bed low and locked with side rails adjusted as appropriate  - Keep care items and personal belongings within reach  - Initiate and maintain comfort rounds  - Make Fall Risk Sign visible to staff  - Apply yellow socks and bracelet for high fall risk patients  - Consider moving patient to room near nurses station  Outcome: Progressing     Problem: DISCHARGE PLANNING  Goal: Discharge to home or other facility with appropriate resources  Description: INTERVENTIONS:  - Identify barriers to discharge w/patient and caregiver  - Arrange for needed discharge resources and transportation as appropriate  - Identify discharge learning needs (meds, wound care, etc )  - Arrange for interpretive services to assist at discharge as needed  - Refer to Case Management Department for coordinating discharge planning if the patient needs post-hospital services based on physician/advanced practitioner order or complex needs related to functional status, cognitive ability, or social support system  Outcome: Progressing     Problem: Knowledge Deficit  Goal: Patient/family/caregiver demonstrates understanding of disease process, treatment plan, medications, and discharge instructions  Description: Complete learning assessment and assess knowledge base    Interventions:  - Provide teaching at level of understanding  - Provide teaching via preferred learning methods  Outcome: Progressing     Problem: Potential for Falls  Goal: Patient will remain free of falls  Description: INTERVENTIONS:  - Educate patient/family on patient safety including physical limitations  - Instruct patient to call for assistance with activity   - Consult OT/PT to assist with strengthening/mobility   - Keep Call bell within reach  - Keep bed low and locked with side rails adjusted as appropriate  - Keep care items and personal belongings within reach  - Initiate and maintain comfort rounds  - Make Fall Risk Sign visible to staff  - Apply yellow socks and bracelet for high fall risk patients  - Consider moving patient to room near nurses station  Outcome: Progressing

## 2022-08-05 NOTE — ASSESSMENT & PLAN NOTE
Patient is on Toprol-XL and Coumadin at home  Hold for now  Continue to hold Coumadin as INR is supratherapeutic  INR is 5  Monitor PT INR

## 2022-08-05 NOTE — PROGRESS NOTES
Progress note - Gastroenterology   Cathy Pop 73 y o  male MRN: [id number]  Unit/Bed#: -01 SDU Encounter: [id number]    ASSESSMENT and PLAN    1  Acute sigmoid diverticulitis  2  Acute on chronic kidney disease  3  Multiple myeloma on chemotherapy  4  Afib on Coumadin at home, currently on hold as INR is still supratherapeutic 5 06 today  5  Chronic normocytic anemia, mild bleeding noted at mehta site today  No overt GIB  6  Last colonoscopy 7/25/22     - Continue abx for diverticulitis  - no more pain, everton liq diet, will try adv to reg low fiber diet today  - trend H&H  - multiple etiologies to be anemic - will monitor for overt bleeding  Transfuse 1U today  Consider FFP/vit K if further drops in hgb w bleeding noted  - add on iron panel, and IV iron prn    Chief Complaint   Patient presents with    Vomiting     +N/V/D starting 7/29  Pt had a colonoscopy 7/25, 7/26 had a chemo tx  Elevated kidney levels on OP bloodwork  SUBJECTIVE/HPI   No complaints  /61   Pulse 57   Temp 98 1 °F (36 7 °C) (Oral)   Resp 20   Ht 6' (1 829 m)   Wt (!) 138 kg (304 lb 7 3 oz)   SpO2 98%   BMI 41 29 kg/m²     PHYSICALEXAM  General appearance: alert, appears stated age and cooperative  Eyes: JENNIE, EOMI, no scleral icterus   Head: Normocephalic, without obvious abnormality, atraumatic  Lungs: clear to auscultation bilaterally, no c/w/r  Heart: regular rate and rhythm, S1, S2 normal, no murmur, click, rub or gallop  Abdomen: soft, non-tender, non-distended; bowel sounds normal; no masses,  no organomegaly  Extremities: extremities normal, atraumatic, no clubbing or cyanosis   No LE edema  Neurologic: Grossly normal, AAOx3, no asterixis    Lab Results   Component Value Date    GLUCOSE 64 (L) 08/01/2022    CALCIUM 8 1 (L) 08/05/2022     01/15/2018    K 3 6 08/05/2022    CO2 24 08/05/2022     08/05/2022    BUN 70 (H) 08/05/2022    CREATININE 4 38 (H) 08/05/2022     Lab Results Component Value Date    WBC 5 86 08/05/2022    HGB 6 0 (LL) 08/05/2022    HCT 19 1 (L) 08/05/2022    MCV 85 08/05/2022     08/05/2022     Lab Results   Component Value Date    ALT 17 08/03/2022    AST 14 08/03/2022     (H) 11/03/2019    ALKPHOS 90 08/03/2022    BILITOT 0 6 01/15/2018     No results found for: AMYLASE  Lab Results   Component Value Date    LIPASE 338 08/02/2022     Lab Results   Component Value Date    IRON 17 (L) 08/04/2022    TIBC 206 (L) 08/04/2022    FERRITIN 165 08/04/2022     Lab Results   Component Value Date    INR 5 06 (HH) 08/05/2022

## 2022-08-05 NOTE — PROGRESS NOTES
NEPHROLOGY PROGRESS NOTE   Jaydon Bender 73 y o  male MRN: [id number]  Unit/Bed#: -01 SDU Encounter: [id number]      ASSESSMENT/PLAN:  1  Acute kidney injury, POA:  Suspect severe prerenal azotemia with nausea, vomiting and diarrhea +/-progression to ATN  · Creatinine peaked at 5 78 and is now improving down to 4 38 today  · CT:  No hydronephrosis  · UA:  1-2 RBCs, 1-2 wbc's  · Bladder scan in significant  · Currently on isolyte at 75cc/h but now with signs of volume overload   · CXR yesterday showed mild vascular congestion with small pleural effusions   · Home diuretics, torsemide 40mg bid, on hold   · Will stop fluids and give IV lasix   2  CKD stage 4:  Baseline creatinine low 2s and follows with Dr Mee Oviedo   3  Systolic CHF with EF 72%: complains of shortness of breath today and CXR consistent with CHF  Weight up 6kg (although unsure if bedscale accurate)   · Will hold IVF and give lasix 80mg IV x 1   4  Anemia:  Hemoglobin trending down to 6 today  Getting transfusion per primary team  5  Acute diverticulitis:  On ceftriaxone and Flagyl  6  Multiple myeloma  7  AFib  8  COPD/NALLELY    Plan Summary:    Hold IVF given increased weight, +13L since admission, blood transfusion, shortness of breath and CXR with vascular congestion   Will give lasix 80mg IV x 1 today    Check am BMP    Strict I/O and daily weights     SUBJECTIVE:  Complains of some shortness of breath with exertion  Is having diarrhea      OBJECTIVE:  Current Weight: Weight - Scale: (!) 138 kg (304 lb 7 3 oz)  Vitals:    08/05/22 1100   BP: 129/61   Pulse: 57   Resp: 20   Temp:    SpO2: 98%       Intake/Output Summary (Last 24 hours) at 8/5/2022 1123  Last data filed at 8/5/2022 3635  Gross per 24 hour   Intake 3503 75 ml   Output 765 ml   Net 2738 75 ml       General:  appears comfortable and in no acute distress   Skin:  No rash, warm, good skin turgor   Eyes:  Sclerae anicteric, no periorbital edema   ENT:  Moist mucous membranes  Neck:  Trachea midline, symmetric   Chest:  Clear to auscultation bilaterally with no wheezes, rales or rhonchi  CVS:  Irregularly irregular   Abdomen:  Soft, nontender, nondistended  Neuro:  Awake and alert  Psych:  Appropriate affect  Extremities: no lower extremity edema   : mehta with clear urine output         Medications:  Scheduled Meds:  Current Facility-Administered Medications   Medication Dose Route Frequency Provider Last Rate    acetaminophen  650 mg Oral Q6H PRN Codi Camp MD      cefTRIAXone  2,000 mg Intravenous Q24H Codi Camp MD 2,000 mg (08/05/22 0826)    fluticasone  1 spray Each Nare BID Julia Parr PA-C      fluticasone-vilanterol  1 puff Inhalation Daily Julia Parr PA-C      insulin lispro  2-12 Units Subcutaneous TID AC Codi Camp MD      metoprolol tartrate  12 5 mg Oral Q12H [address] Saranya Kelly PA-C      metroNIDAZOLE  500 mg Intravenous Q8H Codi Camp  mg (08/05/22 1029)    multi-electrolyte  75 mL/hr Intravenous Continuous DonnaNICOLA Raza 75 mL/hr (08/05/22 0501)    ondansetron  4 mg Intravenous Q6H PRN Codi Camp MD      pantoprazole  40 mg Intravenous Q24H [address] Julia Parr PA-C         PRN Meds:   acetaminophen    ondansetron    Continuous Infusions:multi-electrolyte, 75 mL/hr, Last Rate: 75 mL/hr (08/05/22 0501)        Laboratory Results:  Results from last 7 days   Lab Units 08/05/22  0905 08/05/22  0455 08/05/22  0124 08/05/22  0026 08/04/22  1401 08/04/22  0546 08/04/22  0211 08/03/22  1443 08/03/22  0512 08/03/22  0047 08/02/22  1622 08/02/22  0951 08/02/22  0423 08/02/22  0109 08/01/22  2342 08/01/22  2050   WBC Thousand/uL  --  5 86  --   --   --  7 94  --   --  9 29  --   --   --  8 92 10 26*  --  10 44*   HEMOGLOBIN g/dL 6 0* 6 9* 6 7* 6 4* 7 1* 7 1*  7 1* 6 9* 7 3* 7 3*   < > 7 3*  --  7 5* 8 1*  --  9 0*   I STAT HEMOGLOBIN g/dl  --   --   --   --   --   --   --   --   --   -- --   --   --   --  7 5*  --    HEMATOCRIT % 19 1* 22 4*  --  20 5* 22 2* 21 8*  22 1* 21 5* 22 5* 22 8*   < > 24 4*  --  24 4* 26 8*  --  29 4*   HEMATOCRIT, ISTAT %  --   --   --   --   --   --   --   --   --   --   --   --   --   --  22*  --    PLATELETS Thousands/uL  --  209  --   --   --  169  --   --  165  --   --   --  132* 146*  --  162   SODIUM mmol/L  --  143  --   --   --  145  --  146 148*  --  146 145 144 143  --  143   POTASSIUM mmol/L  --  3 6  --   --   --  3 5  --  3 7 4 1  --  4 4 4 8 5 1 5 4*  --  5 5*   CHLORIDE mmol/L  --  107  --   --   --  109*  --  110* 112*  --  115* 116* 116* 117*  --  114*   CO2 mmol/L  --  24  --   --   --  26  --  23 18*  --  16* 14* 14* 14*  --  12*   CO2, I-STAT mmol/L  --   --   --   --   --   --   --   --   --   --   --   --   --   --  11*  --    BUN mg/dL  --  70*  --   --   --  75*  --  82* 82*  --  87* 91* 89* 92*  --  95*   CREATININE mg/dL  --  4 38*  --   --   --  4 72*  --  5 08* 5 02*  --  5 28* 5 38* 5 32* 5 44*  --  5 78*   CALCIUM mg/dL  --  8 1*  --   --   --  8 2*  --  8 0* 8 1*  --  8 1* 8 2* 8 2* 8 0*  --  8 8   MAGNESIUM mg/dL  --   --   --   --   --   --   --   --  1 9  --   --   --  1 9 1 7  --   --    PHOSPHORUS mg/dL  --   --   --   --   --  4 8*  --   --   --   --   --   --  5 1* 5 5*  --   --    GLUCOSE, ISTAT mg/dl  --   --   --   --   --   --   --   --   --   --   --   --   --   --  64*  --     < > = values in this interval not displayed          r

## 2022-08-05 NOTE — ASSESSMENT & PLAN NOTE
Patient is 72-year-old female with an extensive medical history who has nausea, vomiting and diarrhea with decreased p o  Intake over the last 4 days prior to admission  · CT scan - acute sigmoid diverticulitis with intrahepatic and extrahepatic portal venous air is standing for ischemic bowel no pneumatosis  · Surgery and GI on board  · Received 2 L of IV bolus in the ED  · Continue on IV isolyte  · Diet per GI recommendation  · On Rocephin and Flagyl  · Stool for C diff is negative  Stool cultures are negative  · Pain management p r n  · Antiemetics p r n    · As per surgery no surgical intervention needed at this time  · GI on board  · Trend WBC and fever curve

## 2022-08-05 NOTE — PROGRESS NOTES
[place]  Progress Note - Rhiannon Rodrigez 1950, 73 y o  male MRN: [id number]  Unit/Bed#: -01 SDU Encounter: [id number]  Primary Care Provider: Karen Mueller MD   Date and time admitted to hospital: 8/1/2022  8:27 PM    Hyperkalemia  Assessment & Plan  Admitted with potassium of 5 5  Resolved    Stage 3b chronic kidney disease [facility])  Assessment & Plan  Lab Results   Component Value Date    EGFR 12 08/05/2022    EGFR 11 08/04/2022    EGFR 10 08/03/2022    CREATININE 4 38 (H) 08/05/2022    CREATININE 4 72 (H) 08/04/2022    CREATININE 5 08 (H) 08/03/2022   See above    Anemia  Assessment & Plan  Patient has history of anemia in setting of CKD/multiple myeloma  Hemoglobin this morning is 6 9  Iron panel reviewed  GI on board  Stool for occult blood  Will order 1 unit of packed red cell transfusion  On Protonix  Trend H&H q 8 hours and transfuse if hemoglobin less than 7      Ambulatory dysfunction  Assessment & Plan  P T /OT consult  Has history of left AKA    Chronic obstructive pulmonary disease, unspecified ([facility] [address] )  Assessment & Plan  Continue Flonase and use Breo instead of Advair   Stable at this time    Multiple myeloma [facility])  Assessment & Plan  Last chemo infusion was on July 26  Continue outpatient follow-up    DORA (acute kidney injury) [facility])  Assessment & Plan  Patient with DORA on CKD stage IV in the setting of nausea, vomiting and diarrhea and poor oral intake  Patient admitted with Creatinine elevated at 5 78   Creatinine this morning is 4 38  Most likely due to diarrhea and vomiting  Baseline creatinine in low 2s  Nephrology on board  IV fluids are discontinued  Nephrology ordered diuretics  Avoid hypotension/nephrotoxins medication/NSAIDs  Trend BMP    NALLELY (obstructive sleep apnea)  Assessment & Plan  History of obstructive sleep apnea with noncompliance with CPAP    GERD (gastroesophageal reflux disease)  Assessment & Plan  On Protonix    Morbid obesity [facility])  Assessment & Plan  Encourage weight loss and lifestyle modification    Chronic atrial fibrillation [facility])  Assessment & Plan  Patient is on Toprol-XL and Coumadin at home  Hold for now  Continue to hold Coumadin as INR is supratherapeutic  INR is 5  Monitor PT INR    * Acute diverticulitis  Assessment & Plan  Patient is 51-year-old female with an extensive medical history who has nausea, vomiting and diarrhea with decreased p o  Intake over the last 4 days prior to admission  · CT scan - acute sigmoid diverticulitis with intrahepatic and extrahepatic portal venous air is standing for ischemic bowel no pneumatosis  · Surgery and GI on board  · Received 2 L of IV bolus in the ED  · Continue on IV isolyte  · Diet per GI recommendation  · On Rocephin and Flagyl  · Stool for C diff is negative  Stool cultures are negative  · Pain management p r n  · Antiemetics p r n  · As per surgery no surgical intervention needed at this time  · GI on board  · Trend WBC and fever curve        Labs & Imaging: I have personally reviewed pertinent reports  VTE Prophylaxis: in place  Code Status:   Level 1 - Full Code    Patient Centered Rounds: I have performed bedside rounds with nursing staff today  Discussions with Specialists or Other Care Team Provider: GI    Education and Discussions with Family / Patient:  Son on phone    Current Length of Stay: 4 day(s)    Current Patient Status: Inpatient   Certification Statement: The patient will continue to require additional inpatient hospital stay due to see my assessment and plan  Subjective:   Patient is seen and examined at bedside  Complains of having diarrhea  Denies any abdominal pain or nausea  No rectal bleeding  Afebrile  All other ROS are negative  Objective:    Vitals: Blood pressure 122/64, pulse 56, temperature 98 1 °F (36 7 °C), temperature source Oral, resp  rate 19, height 6' (1 829 m), weight (!) 138 kg (304 lb 7 3 oz), SpO2 98 %  ,Body mass index is 41 29 kg/m²  SPO2 RA Rest    Flowsheet Row ED to Hosp-Admission (Current) from 8/1/2022 in [address] Intensive Care Unit   SpO2 98 %   SpO2 Activity At Rest   O2 Device Nasal cannula   O2 Flow Rate --        I&O:     Intake/Output Summary (Last 24 hours) at 8/5/2022 0756  Last data filed at 8/5/2022 0501  Gross per 24 hour   Intake 4875 83 ml   Output 830 ml   Net 4045 83 ml       Physical Exam:    General- Alert, lying comfortably in bed  Not in any acute distress  Neck- Supple, No JVD  CVS- regular, S1 and S2 normal  Chest- Bilateral Air entry, No rhochi, crackles or wheezing present  Abdomen- soft, nontender, not distended, no guarding or rigidity, BS+  Extremities-  No pedal edema, No calf tenderness  Left AKA  CNS-   Alert, awake  No focal deficits present      Invasive Devices  Report    Peripheral Intravenous Line  Duration           Peripheral IV 08/01/22 Right Antecubital 3 days    Peripheral IV 08/03/22 Right Forearm 2 days          Drain  Duration           Urethral Catheter Non-latex 16 Fr  1 day                      Social History  reviewed  Family History   Problem Relation Age of Onset    Other Mother         CARDIAC DISORDER     Diabetes Mother     Cancer Father     Other Family         CARDIAC DISORDER     Diabetes Family     Cancer Family     Mental illness Family     Kidney disease Sister     Diabetes Sister     reviewed    Meds:  Current Facility-Administered Medications   Medication Dose Route Frequency Provider Last Rate Last Admin    acetaminophen (TYLENOL) tablet 650 mg  650 mg Oral Q6H PRN Jeff Causey MD   650 mg at 08/03/22 2220    cefTRIAXone (ROCEPHIN) IVPB (premix in dextrose) 2,000 mg 50 mL  2,000 mg Intravenous Q24H Jeff Causey MD        fluticasone (FLONASE) 50 mcg/act nasal spray 1 spray  1 spray Each Nare BID Julia Parr PA-C   1 spray at 08/04/22 1707    fluticasone-vilanterol (BREO ELLIPTA) 200-25 MCG/INH inhaler 1 puff  1 puff Inhalation Daily Julia Parr PA-C   1 puff at 08/04/22 0842    insulin lispro (HumaLOG) 100 units/mL subcutaneous injection 2-12 Units  2-12 Units Subcutaneous TID [facility] Harley Sanders MD   2 Units at 08/04/22 1707    metoprolol tartrate (LOPRESSOR) partial tablet 12 5 mg  12 5 mg Oral Q12H [facility] Bethany Lagunas PA-C        metroNIDAZOLE (FLAGYL) IVPB (premix) 500 mg 100 mL  500 mg Intravenous Q8H Harley Sanders MD   Stopped at 08/04/22 2345    multi-electrolyte (PLASMALYTE-A/ISOLYTE-S PH 7 4) IV solution  75 mL/hr Intravenous Continuous NICOLA Flood 75 mL/hr at 08/05/22 0501 75 mL/hr at 08/05/22 0501    ondansetron (ZOFRAN) injection 4 mg  4 mg Intravenous Q6H PRN Harley Sanders MD        pantoprazole (PROTONIX) injection 40 mg  40 mg Intravenous Q24H [facility] Julia Parr PA-C   40 mg at 08/04/22 0841      Medications Prior to Admission   Medication    acetaminophen (TYLENOL) 500 mg tablet    albuterol (PROVENTIL HFA,VENTOLIN HFA) 90 mcg/act inhaler    Alcohol Swabs (ALCOHOL PREP) 70 % PADS    allopurinol (ZYLOPRIM) 300 mg tablet    ammonium lactate (LAC-HYDRIN) 12 % lotion    atorvastatin (LIPITOR) 40 mg tablet    BD AUTOSHIELD DUO 30G X 5 MM MISC    BD INSULIN SYRINGE U/F 31G X 5/16" 0 5 ML MISC    BD PEN NEEDLE HILARIO U/F 32G X 4 MM MISC    bisacodyl (DULCOLAX) 10 mg suppository    bisacodyl (DULCOLAX) 5 mg EC tablet    bisacodyl (DULCOLAX) 5 mg EC tablet    bortezomib (VELCADE)    clotrimazole (LOTRIMIN) 1 % cream    colchicine (COLCRYS) 0 6 mg tablet    docusate sodium (COLACE) 100 mg capsule    Easy Touch Safety Lancets 28G MISC    fluticasone (FLONASE) 50 mcg/act nasal spray    fluticasone-salmeterol (ADVAIR DISKUS, WIXELA INHUB) 250-50 mcg/dose inhaler    insulin glargine (Lantus SoloStar) 100 units/mL injection pen    liraglutide (Victoza) injection    loperamide (IMODIUM) 2 mg capsule    magnesium hydroxide (MILK OF MAGNESIA) 400 mg/5 mL oral suspension    Melatonin 5 MG TABS    metFORMIN (GLUCOPHAGE) 1000 MG tablet    metoprolol succinate (TOPROL-XL) 25 mg 24 hr tablet    multivitamin-minerals therapeutic (THERA-M)    NOVOLOG FLEXPEN 100 units/mL SOPN    nystatin (MYCOSTATIN) powder    omeprazole (PriLOSEC) 40 MG capsule    ondansetron (ZOFRAN) 4 mg tablet    polyethylene glycol (GOLYTELY) 4000 mL solution    polyethylene glycol (GOLYTELY) 4000 mL solution    polyethylene glycol (MIRALAX) 17 g packet    potassium chloride (K-DUR,KLOR-CON) 20 mEq tablet    torsemide (DEMADEX) 20 mg tablet    warfarin (COUMADIN) 1 mg tablet    warfarin (COUMADIN) 6 mg tablet       Labs:  Results from last 7 days   Lab Units 08/05/22  0455 08/05/22  0124 08/05/22  0026 08/04/22  1401 08/04/22  0546 08/03/22  1443 08/03/22  0512   WBC Thousand/uL 5 86  --   --   --  7 94  --  9 29   HEMOGLOBIN g/dL 6 9* 6 7* 6 4* 7 1* 7 1*  7 1*   < > 7 3*   HEMATOCRIT % 22 4*  --  20 5* 22 2* 21 8*  22 1*   < > 22 8*   PLATELETS Thousands/uL 209  --   --   --  169  --  165   NEUTROS PCT % 71  --   --   --  80*  --  81*   LYMPHS PCT % 16  --   --   --  9*  --  9*   MONOS PCT % 9  --   --   --  8  --  7   EOS PCT % 3  --   --   --  2  --  1    < > = values in this interval not displayed       Results from last 7 days   Lab Units 08/05/22  0455 08/04/22  0546 08/03/22  1443 08/03/22  0512 08/02/22  1622 08/02/22  0951 08/02/22  0423 08/02/22  0109 08/01/22  2342   POTASSIUM mmol/L 3 6 3 5 3 7 4 1   < > 4 8 5 1   < >  --    CHLORIDE mmol/L 107 109* 110* 112*   < > 116* 116*   < >  --    CO2 mmol/L 24 26 23 18*   < > 14* 14*   < >  --    CO2, I-STAT mmol/L  --   --   --   --   --   --   --   --  11*   BUN mg/dL 70* 75* 82* 82*   < > 91* 89*   < >  --    CREATININE mg/dL 4 38* 4 72* 5 08* 5 02*   < > 5 38* 5 32*   < >  --    CALCIUM mg/dL 8 1* 8 2* 8 0* 8 1*   < > 8 2* 8 2*   < >  --    ALK PHOS U/L  --   --   --  90  --  100 101   < >  --    ALT U/L  --   --   --  17  --  21 21 < >  --    AST U/L  --   --   --  14  --  18 14   < >  --    GLUCOSE, ISTAT mg/dl  --   --   --   --   --   --   --   --  64*    < > = values in this interval not displayed  Lab Results   Component Value Date    TROPONINI 5 19 (H) 06/27/2020    TROPONINI 5 85 (H) 06/27/2020    TROPONINI 6 35 (H) 06/27/2020    CKMB 4 1 06/25/2020    CKTOTAL 271 06/25/2020    CKTOTAL 53 03/23/2018    CKTOTAL 55 10/08/2017     Results from last 7 days   Lab Units 08/05/22  0455 08/04/22  0546 08/03/22  0512   INR  5 06* 4 64* 3 59*     Lab Results   Component Value Date    BLOODCX No Growth After 5 Days  10/04/2021    BLOODCX No Growth After 5 Days  10/04/2021    BLOODCX No Growth After 5 Days  06/25/2020    BLOODCX No Growth After 5 Days  06/25/2020    URINECX >100,000 cfu/ml Escherichia coli (A) 12/05/2019    WOUNDCULT 4+ Growth of Proteus mirabilis (A) 07/25/2019    WOUNDCULT 4+ Growth of  07/25/2019    WOUNDCULT (A) 07/25/2019     4+ Growth of Methicillin Resistant Staphylococcus aureus    WOUNDCULT 1+ Growth of Proteus mirabilis (A) 07/25/2019    WOUNDCULT 4+ Growth of  07/25/2019    SPUTUMCULTUR 4+ Growth of  07/11/2020    SPUTUMCULTUR 1+ Growth of Aspergillus fumigatus (A) 07/11/2020         Imaging:  Results for orders placed during the hospital encounter of 08/01/22    XR chest portable    Narrative  CHEST    INDICATION:   Shortness of breath rule out CHF  COMPARISON:  8/2/2022    EXAM PERFORMED/VIEWS:  XR CHEST PORTABLE      FINDINGS:    Unchanged cardiomegaly  Vascular congestion and bilateral pleural effusions likely on the basis of CHF  Osseous structures appear within normal limits for patient age  Impression  Mild central vascular congestion with probable small pleural effusions in keeping with CHF        Workstation performed: [id number]    Results for orders placed during the hospital encounter of 10/04/21    XR chest pa & lateral    Narrative  CHEST    INDICATION:   follow up lung consolidations  COMPARISON:  CTA chest/abdomen/pelvis 10/4/2021  Chest radiograph 7/8/2020  EXAM PERFORMED/VIEWS:  XR CHEST PA & LATERAL      FINDINGS:    Heart shadow is enlarged but unchanged from prior exam     Again seen is dense left lower lobe/retrocardiac consolidation with ill-defined airspace opacities in the lingula and right lower lobe, similar to recent chest CT  Trace left pleural effusion  No pneumothorax  Osseous structures appear within normal limits for patient age  Impression  Stable left lower lobe/retrocardiac consolidation and lingular and right lower lobe ill-defined airspace opacities  Trace left pleural effusion  Workstation performed: [id number]      Last 24 Hours Medication List:   Current Facility-Administered Medications   Medication Dose Route Frequency Provider Last Rate    acetaminophen  650 mg Oral Q6H PRN Casi Serrano MD      cefTRIAXone  2,000 mg Intravenous Q24H Casi Serrano MD      fluticasone  1 spray Each Nare BID Julia Parr PA-C      fluticasone-vilanterol  1 puff Inhalation Daily Julia Parr PA-C      insulin lispro  2-12 Units Subcutaneous TID AC Casi Serrano MD      metoprolol tartrate  12 5 mg Oral Q12H [facility] JOEY      metroNIDAZOLE  500 mg Intravenous Q8H Casi Serrano MD Stopped (08/04/22 7565)    multi-electrolyte  75 mL/hr Intravenous Continuous Isak Cantril, CRNP 75 mL/hr (08/05/22 0501)    ondansetron  4 mg Intravenous Q6H PRN Casi Serrano MD      pantoprazole  40 mg Intravenous Q24H [address] Julia Parr PA-C          Today, Patient Was Seen By: Casi Serrano MD    ** Please Note: Dictation voice to text software may have been used in the creation of this document   **

## 2022-08-05 NOTE — ASSESSMENT & PLAN NOTE
Patient with DORA on CKD stage IV in the setting of nausea, vomiting and diarrhea and poor oral intake  Patient admitted with Creatinine elevated at 5 78   Creatinine this morning is 4 38  Most likely due to diarrhea and vomiting  Baseline creatinine in low 2s  Nephrology on board  IV fluids are discontinued  Nephrology ordered diuretics  Avoid hypotension/nephrotoxins medication/NSAIDs  Trend BMP

## 2022-08-05 NOTE — QUICK NOTE
Pt's hemoglobin resulted at 6 4 and repeat hgb 6 7  Pt has chronic anemia with baseline hgb around 7 0  Currently hemodynamically stable and asymptomatic from anemia  Hold off on blood transfusion for now  Re-check hgb with morning labs and assess need for transfusion at that time

## 2022-08-05 NOTE — PLAN OF CARE
Problem: MOBILITY - ADULT  Goal: Maintain or return to baseline ADL function  Description: INTERVENTIONS:  -  Assess patient's ability to carry out ADLs; assess patient's baseline for ADL function and identify physical deficits which impact ability to perform ADLs (bathing, care of mouth/teeth, toileting, grooming, dressing, etc )  - Assess/evaluate cause of self-care deficits   - Assess range of motion  - Assess patient's mobility; develop plan if impaired  - Assess patient's need for assistive devices and provide as appropriate  - Encourage maximum independence but intervene and supervise when necessary  - Involve family in performance of ADLs  - Assess for home care needs following discharge   - Consider OT consult to assist with ADL evaluation and planning for discharge  - Provide patient education as appropriate  Outcome: Progressing  Goal: Maintains/Returns to pre admission functional level  Description: INTERVENTIONS:  - Perform BMAT or MOVE assessment daily    - Set and communicate daily mobility goal to care team and patient/family/caregiver     - Collaborate with rehabilitation services on mobility goals if consulted  - Out of bed for toileting  - Record patient progress and toleration of activity level   Outcome: Progressing     Problem: Prexisting or High Potential for Compromised Skin Integrity  Goal: Skin integrity is maintained or improved  Description: INTERVENTIONS:  - Identify patients at risk for skin breakdown  - Assess and monitor skin integrity  - Assess and monitor nutrition and hydration status  - Monitor labs   - Assess for incontinence   - Turn and reposition patient  - Assist with mobility/ambulation  - Relieve pressure over bony prominences  - Avoid friction and shearing  - Provide appropriate hygiene as needed including keeping skin clean and dry  - Evaluate need for skin moisturizer/barrier cream  - Collaborate with interdisciplinary team   - Patient/family teaching  - Consider wound care consult   Outcome: Progressing     Problem: PAIN - ADULT  Goal: Verbalizes/displays adequate comfort level or baseline comfort level  Description: Interventions:  - Encourage patient to monitor pain and request assistance  - Assess pain using appropriate pain scale  - Administer analgesics based on type and severity of pain and evaluate response  - Implement non-pharmacological measures as appropriate and evaluate response  - Consider cultural and social influences on pain and pain management  - Notify physician/advanced practitioner if interventions unsuccessful or patient reports new pain  Outcome: Progressing     Problem: INFECTION - ADULT  Goal: Absence or prevention of progression during hospitalization  Description: INTERVENTIONS:  - Assess and monitor for signs and symptoms of infection  - Monitor lab/diagnostic results  - Monitor all insertion sites, i e  indwelling lines, tubes, and drains  - Monitor endotracheal if appropriate and nasal secretions for changes in amount and color  - [place] appropriate cooling/warming therapies per order  - Administer medications as ordered  - Instruct and encourage patient and family to use good hand hygiene technique  - Identify and instruct in appropriate isolation precautions for identified infection/condition  Outcome: Progressing  Goal: Absence of fever/infection during neutropenic period  Description: INTERVENTIONS:  - Monitor WBC    Outcome: Progressing     Problem: SAFETY ADULT  Goal: Maintain or return to baseline ADL function  Description: INTERVENTIONS:  -  Assess patient's ability to carry out ADLs; assess patient's baseline for ADL function and identify physical deficits which impact ability to perform ADLs (bathing, care of mouth/teeth, toileting, grooming, dressing, etc )  - Assess/evaluate cause of self-care deficits   - Assess range of motion  - Assess patient's mobility; develop plan if impaired  - Assess patient's need for assistive devices and provide as appropriate  - Encourage maximum independence but intervene and supervise when necessary  - Involve family in performance of ADLs  - Assess for home care needs following discharge   - Consider OT consult to assist with ADL evaluation and planning for discharge  - Provide patient education as appropriate  Outcome: Progressing  Goal: Maintains/Returns to pre admission functional level  Description: INTERVENTIONS:  - Perform BMAT or MOVE assessment daily    - Set and communicate daily mobility goal to care team and patient/family/caregiver     - Collaborate with rehabilitation services on mobility goals if consulted  - Out of bed for toileting  - Record patient progress and toleration of activity level   Outcome: Progressing  Goal: Patient will remain free of falls  Description: INTERVENTIONS:  - Educate patient/family on patient safety including physical limitations  - Instruct patient to call for assistance with activity   - Consult OT/PT to assist with strengthening/mobility   - Keep Call bell within reach  - Keep bed low and locked with side rails adjusted as appropriate  - Keep care items and personal belongings within reach  - Initiate and maintain comfort rounds  - Make Fall Risk Sign visible to staff  - Apply yellow socks and bracelet for high fall risk patients  - Consider moving patient to room near nurses station  Outcome: Progressing     Problem: DISCHARGE PLANNING  Goal: Discharge to home or other facility with appropriate resources  Description: INTERVENTIONS:  - Identify barriers to discharge w/patient and caregiver  - Arrange for needed discharge resources and transportation as appropriate  - Identify discharge learning needs (meds, wound care, etc )  - Arrange for interpretive services to assist at discharge as needed  - Refer to Case Management Department for coordinating discharge planning if the patient needs post-hospital services based on physician/advanced practitioner order or complex needs related to functional status, cognitive ability, or social support system  Outcome: Progressing     Problem: Knowledge Deficit  Goal: Patient/family/caregiver demonstrates understanding of disease process, treatment plan, medications, and discharge instructions  Description: Complete learning assessment and assess knowledge base    Interventions:  - Provide teaching at level of understanding  - Provide teaching via preferred learning methods  Outcome: Progressing     Problem: Potential for Falls  Goal: Patient will remain free of falls  Description: INTERVENTIONS:  - Educate patient/family on patient safety including physical limitations  - Instruct patient to call for assistance with activity   - Consult OT/PT to assist with strengthening/mobility   - Keep Call bell within reach  - Keep bed low and locked with side rails adjusted as appropriate  - Keep care items and personal belongings within reach  - Initiate and maintain comfort rounds  - Make Fall Risk Sign visible to staff  - Apply yellow socks and bracelet for high fall risk patients  - Consider moving patient to room near nurses station  Outcome: Progressing

## 2022-08-05 NOTE — ASSESSMENT & PLAN NOTE
Lab Results   Component Value Date    EGFR 12 08/05/2022    EGFR 11 08/04/2022    EGFR 10 08/03/2022    CREATININE 4 38 (H) 08/05/2022    CREATININE 4 72 (H) 08/04/2022    CREATININE 5 08 (H) 08/03/2022   See above

## 2022-08-06 LAB
ABO GROUP BLD BPU: NORMAL
ANION GAP SERPL CALCULATED.3IONS-SCNC: 10 MMOL/L (ref 4–13)
ANION GAP SERPL CALCULATED.3IONS-SCNC: 12 MMOL/L (ref 4–13)
BASOPHILS # BLD AUTO: 0.03 THOUSANDS/ΜL (ref 0–0.1)
BASOPHILS NFR BLD AUTO: 1 % (ref 0–1)
BPU ID: NORMAL
BUN SERPL-MCNC: 63 MG/DL (ref 5–25)
BUN SERPL-MCNC: 69 MG/DL (ref 5–25)
CA-I BLD-SCNC: 1.1 MMOL/L (ref 1.12–1.32)
CALCIUM SERPL-MCNC: 8.1 MG/DL (ref 8.3–10.1)
CALCIUM SERPL-MCNC: 8.3 MG/DL (ref 8.3–10.1)
CHLORIDE SERPL-SCNC: 108 MMOL/L (ref 96–108)
CHLORIDE SERPL-SCNC: 108 MMOL/L (ref 96–108)
CO2 SERPL-SCNC: 24 MMOL/L (ref 21–32)
CO2 SERPL-SCNC: 24 MMOL/L (ref 21–32)
CREAT SERPL-MCNC: 4.07 MG/DL (ref 0.6–1.3)
CREAT SERPL-MCNC: 4.27 MG/DL (ref 0.6–1.3)
CROSSMATCH: NORMAL
EOSINOPHIL # BLD AUTO: 0.19 THOUSAND/ΜL (ref 0–0.61)
EOSINOPHIL NFR BLD AUTO: 3 % (ref 0–6)
ERYTHROCYTE [DISTWIDTH] IN BLOOD BY AUTOMATED COUNT: 18.7 % (ref 11.6–15.1)
EST. AVERAGE GLUCOSE BLD GHB EST-MCNC: 134 MG/DL
GFR SERPL CREATININE-BSD FRML MDRD: 13 ML/MIN/1.73SQ M
GFR SERPL CREATININE-BSD FRML MDRD: 14 ML/MIN/1.73SQ M
GLUCOSE SERPL-MCNC: 118 MG/DL (ref 65–140)
GLUCOSE SERPL-MCNC: 122 MG/DL (ref 65–140)
GLUCOSE SERPL-MCNC: 156 MG/DL (ref 65–140)
GLUCOSE SERPL-MCNC: 165 MG/DL (ref 65–140)
GLUCOSE SERPL-MCNC: 234 MG/DL (ref 65–140)
GLUCOSE SERPL-MCNC: 292 MG/DL (ref 65–140)
HBA1C MFR BLD: 6.3 %
HCT VFR BLD AUTO: 23.5 % (ref 36.5–49.3)
HCT VFR BLD AUTO: 24.2 % (ref 36.5–49.3)
HCT VFR BLD AUTO: 24.4 % (ref 36.5–49.3)
HCT VFR BLD AUTO: 24.7 % (ref 36.5–49.3)
HGB BLD-MCNC: 7.5 G/DL (ref 12–17)
HGB BLD-MCNC: 7.7 G/DL (ref 12–17)
IMM GRANULOCYTES # BLD AUTO: 0.07 THOUSAND/UL (ref 0–0.2)
IMM GRANULOCYTES NFR BLD AUTO: 1 % (ref 0–2)
LYMPHOCYTES # BLD AUTO: 0.72 THOUSANDS/ΜL (ref 0.6–4.47)
LYMPHOCYTES NFR BLD AUTO: 12 % (ref 14–44)
MAGNESIUM SERPL-MCNC: 1.9 MG/DL (ref 1.6–2.6)
MAGNESIUM SERPL-MCNC: 1.9 MG/DL (ref 1.6–2.6)
MCH RBC QN AUTO: 26.9 PG (ref 26.8–34.3)
MCHC RBC AUTO-ENTMCNC: 31.9 G/DL (ref 31.4–37.4)
MCV RBC AUTO: 84 FL (ref 82–98)
MONOCYTES # BLD AUTO: 0.44 THOUSAND/ΜL (ref 0.17–1.22)
MONOCYTES NFR BLD AUTO: 7 % (ref 4–12)
NEUTROPHILS # BLD AUTO: 4.49 THOUSANDS/ΜL (ref 1.85–7.62)
NEUTS SEG NFR BLD AUTO: 76 % (ref 43–75)
NRBC BLD AUTO-RTO: 1 /100 WBCS
PHOSPHATE SERPL-MCNC: 5.4 MG/DL (ref 2.3–4.1)
PLATELET # BLD AUTO: 233 THOUSANDS/UL (ref 149–390)
PMV BLD AUTO: 10.9 FL (ref 8.9–12.7)
POTASSIUM SERPL-SCNC: 3.3 MMOL/L (ref 3.5–5.3)
POTASSIUM SERPL-SCNC: 3.3 MMOL/L (ref 3.5–5.3)
RBC # BLD AUTO: 2.79 MILLION/UL (ref 3.88–5.62)
SODIUM SERPL-SCNC: 142 MMOL/L (ref 135–147)
SODIUM SERPL-SCNC: 144 MMOL/L (ref 135–147)
UNIT DISPENSE STATUS: NORMAL
UNIT PRODUCT CODE: NORMAL
UNIT PRODUCT VOLUME: 350 ML
UNIT RH: NORMAL
WBC # BLD AUTO: 5.94 THOUSAND/UL (ref 4.31–10.16)

## 2022-08-06 PROCEDURE — 83735 ASSAY OF MAGNESIUM: CPT | Performed by: NURSE PRACTITIONER

## 2022-08-06 PROCEDURE — C9113 INJ PANTOPRAZOLE SODIUM, VIA: HCPCS | Performed by: PHYSICIAN ASSISTANT

## 2022-08-06 PROCEDURE — 82330 ASSAY OF CALCIUM: CPT | Performed by: NURSE PRACTITIONER

## 2022-08-06 PROCEDURE — 85014 HEMATOCRIT: CPT | Performed by: INTERNAL MEDICINE

## 2022-08-06 PROCEDURE — 99232 SBSQ HOSP IP/OBS MODERATE 35: CPT | Performed by: INTERNAL MEDICINE

## 2022-08-06 PROCEDURE — 85018 HEMOGLOBIN: CPT | Performed by: INTERNAL MEDICINE

## 2022-08-06 PROCEDURE — 80048 BASIC METABOLIC PNL TOTAL CA: CPT | Performed by: NURSE PRACTITIONER

## 2022-08-06 PROCEDURE — 85025 COMPLETE CBC W/AUTO DIFF WBC: CPT | Performed by: NURSE PRACTITIONER

## 2022-08-06 PROCEDURE — 82948 REAGENT STRIP/BLOOD GLUCOSE: CPT

## 2022-08-06 PROCEDURE — 84100 ASSAY OF PHOSPHORUS: CPT | Performed by: NURSE PRACTITIONER

## 2022-08-06 PROCEDURE — 99233 SBSQ HOSP IP/OBS HIGH 50: CPT | Performed by: INTERNAL MEDICINE

## 2022-08-06 RX ORDER — INSULIN LISPRO 100 [IU]/ML
2-12 INJECTION, SOLUTION INTRAVENOUS; SUBCUTANEOUS
Status: DISCONTINUED | OUTPATIENT
Start: 2022-08-06 | End: 2022-08-10 | Stop reason: HOSPADM

## 2022-08-06 RX ORDER — POTASSIUM CHLORIDE 20 MEQ/1
40 TABLET, EXTENDED RELEASE ORAL ONCE
Status: COMPLETED | OUTPATIENT
Start: 2022-08-06 | End: 2022-08-06

## 2022-08-06 RX ORDER — CALCIUM GLUCONATE 20 MG/ML
1 INJECTION, SOLUTION INTRAVENOUS ONCE
Status: COMPLETED | OUTPATIENT
Start: 2022-08-06 | End: 2022-08-06

## 2022-08-06 RX ADMIN — PANTOPRAZOLE SODIUM 40 MG: 40 INJECTION, POWDER, FOR SOLUTION INTRAVENOUS at 08:35

## 2022-08-06 RX ADMIN — POTASSIUM CHLORIDE 40 MEQ: 1500 TABLET, EXTENDED RELEASE ORAL at 17:58

## 2022-08-06 RX ADMIN — FLUTICASONE PROPIONATE 1 SPRAY: 50 SPRAY, METERED NASAL at 17:07

## 2022-08-06 RX ADMIN — FLUTICASONE FUROATE AND VILANTEROL TRIFENATATE 1 PUFF: 200; 25 POWDER RESPIRATORY (INHALATION) at 08:35

## 2022-08-06 RX ADMIN — CALCIUM GLUCONATE 1 G: 20 INJECTION, SOLUTION INTRAVENOUS at 15:49

## 2022-08-06 RX ADMIN — Medication 12.5 MG: at 21:11

## 2022-08-06 RX ADMIN — CEFTRIAXONE 2000 MG: 2 INJECTION, SOLUTION INTRAVENOUS at 08:35

## 2022-08-06 RX ADMIN — LOPERAMIDE HYDROCHLORIDE 2 MG: 2 CAPSULE ORAL at 10:08

## 2022-08-06 RX ADMIN — POTASSIUM CHLORIDE 40 MEQ: 1500 TABLET, EXTENDED RELEASE ORAL at 12:09

## 2022-08-06 RX ADMIN — ACETAMINOPHEN 650 MG: 325 TABLET ORAL at 22:53

## 2022-08-06 RX ADMIN — METRONIDAZOLE 500 MG: 500 INJECTION, SOLUTION INTRAVENOUS at 10:05

## 2022-08-06 RX ADMIN — METRONIDAZOLE 500 MG: 500 INJECTION, SOLUTION INTRAVENOUS at 15:42

## 2022-08-06 RX ADMIN — FLUTICASONE PROPIONATE 1 SPRAY: 50 SPRAY, METERED NASAL at 08:36

## 2022-08-06 RX ADMIN — LOPERAMIDE HYDROCHLORIDE 2 MG: 2 CAPSULE ORAL at 12:12

## 2022-08-06 RX ADMIN — INSULIN LISPRO 2 UNITS: 100 INJECTION, SOLUTION INTRAVENOUS; SUBCUTANEOUS at 21:11

## 2022-08-06 RX ADMIN — INSULIN LISPRO 4 UNITS: 100 INJECTION, SOLUTION INTRAVENOUS; SUBCUTANEOUS at 11:24

## 2022-08-06 RX ADMIN — INSULIN LISPRO 2 UNITS: 100 INJECTION, SOLUTION INTRAVENOUS; SUBCUTANEOUS at 15:35

## 2022-08-06 NOTE — PLAN OF CARE
Problem: MOBILITY - ADULT  Goal: Maintain or return to baseline ADL function  Description: INTERVENTIONS:  -  Assess patient's ability to carry out ADLs; assess patient's baseline for ADL function and identify physical deficits which impact ability to perform ADLs (bathing, care of mouth/teeth, toileting, grooming, dressing, etc )  - Assess/evaluate cause of self-care deficits   - Assess range of motion  - Assess patient's mobility; develop plan if impaired  - Assess patient's need for assistive devices and provide as appropriate  - Encourage maximum independence but intervene and supervise when necessary  - Involve family in performance of ADLs  - Assess for home care needs following discharge   - Consider OT consult to assist with ADL evaluation and planning for discharge  - Provide patient education as appropriate  Outcome: Progressing  Goal: Maintains/Returns to pre admission functional level  Description: INTERVENTIONS:  - Perform BMAT or MOVE assessment daily    - Set and communicate daily mobility goal to care team and patient/family/caregiver     - Collaborate with rehabilitation services on mobility goals if consulted  - Out of bed for toileting  - Record patient progress and toleration of activity level   Outcome: Progressing     Problem: Prexisting or High Potential for Compromised Skin Integrity  Goal: Skin integrity is maintained or improved  Description: INTERVENTIONS:  - Identify patients at risk for skin breakdown  - Assess and monitor skin integrity  - Assess and monitor nutrition and hydration status  - Monitor labs   - Assess for incontinence   - Turn and reposition patient  - Assist with mobility/ambulation  - Relieve pressure over bony prominences  - Avoid friction and shearing  - Provide appropriate hygiene as needed including keeping skin clean and dry  - Evaluate need for skin moisturizer/barrier cream  - Collaborate with interdisciplinary team   - Patient/family teaching  - Consider wound care consult   Outcome: Progressing     Problem: PAIN - ADULT  Goal: Verbalizes/displays adequate comfort level or baseline comfort level  Description: Interventions:  - Encourage patient to monitor pain and request assistance  - Assess pain using appropriate pain scale  - Administer analgesics based on type and severity of pain and evaluate response  - Implement non-pharmacological measures as appropriate and evaluate response  - Consider cultural and social influences on pain and pain management  - Notify physician/advanced practitioner if interventions unsuccessful or patient reports new pain  Outcome: Progressing     Problem: INFECTION - ADULT  Goal: Absence or prevention of progression during hospitalization  Description: INTERVENTIONS:  - Assess and monitor for signs and symptoms of infection  - Monitor lab/diagnostic results  - Monitor all insertion sites, i e  indwelling lines, tubes, and drains  - Monitor endotracheal if appropriate and nasal secretions for changes in amount and color  - [place] appropriate cooling/warming therapies per order  - Administer medications as ordered  - Instruct and encourage patient and family to use good hand hygiene technique  - Identify and instruct in appropriate isolation precautions for identified infection/condition  Outcome: Progressing  Goal: Absence of fever/infection during neutropenic period  Description: INTERVENTIONS:  - Monitor WBC    Outcome: Progressing     Problem: SAFETY ADULT  Goal: Maintain or return to baseline ADL function  Description: INTERVENTIONS:  -  Assess patient's ability to carry out ADLs; assess patient's baseline for ADL function and identify physical deficits which impact ability to perform ADLs (bathing, care of mouth/teeth, toileting, grooming, dressing, etc )  - Assess/evaluate cause of self-care deficits   - Assess range of motion  - Assess patient's mobility; develop plan if impaired  - Assess patient's need for assistive devices and provide as appropriate  - Encourage maximum independence but intervene and supervise when necessary  - Involve family in performance of ADLs  - Assess for home care needs following discharge   - Consider OT consult to assist with ADL evaluation and planning for discharge  - Provide patient education as appropriate  Outcome: Progressing  Goal: Maintains/Returns to pre admission functional level  Description: INTERVENTIONS:  - Perform BMAT or MOVE assessment daily    - Set and communicate daily mobility goal to care team and patient/family/caregiver     - Collaborate with rehabilitation services on mobility goals if consulted  - Out of bed for toileting  - Record patient progress and toleration of activity level   Outcome: Progressing  Goal: Patient will remain free of falls  Description: INTERVENTIONS:  - Educate patient/family on patient safety including physical limitations  - Instruct patient to call for assistance with activity   - Consult OT/PT to assist with strengthening/mobility   - Keep Call bell within reach  - Keep bed low and locked with side rails adjusted as appropriate  - Keep care items and personal belongings within reach  - Initiate and maintain comfort rounds  - Make Fall Risk Sign visible to staff  - Apply yellow socks and bracelet for high fall risk patients  - Consider moving patient to room near nurses station  Outcome: Progressing     Problem: DISCHARGE PLANNING  Goal: Discharge to home or other facility with appropriate resources  Description: INTERVENTIONS:  - Identify barriers to discharge w/patient and caregiver  - Arrange for needed discharge resources and transportation as appropriate  - Identify discharge learning needs (meds, wound care, etc )  - Arrange for interpretive services to assist at discharge as needed  - Refer to Case Management Department for coordinating discharge planning if the patient needs post-hospital services based on physician/advanced practitioner order or complex needs related to functional status, cognitive ability, or social support system  Outcome: Progressing     Problem: Knowledge Deficit  Goal: Patient/family/caregiver demonstrates understanding of disease process, treatment plan, medications, and discharge instructions  Description: Complete learning assessment and assess knowledge base    Interventions:  - Provide teaching at level of understanding  - Provide teaching via preferred learning methods  Outcome: Progressing     Problem: Potential for Falls  Goal: Patient will remain free of falls  Description: INTERVENTIONS:  - Educate patient/family on patient safety including physical limitations  - Instruct patient to call for assistance with activity   - Consult OT/PT to assist with strengthening/mobility   - Keep Call bell within reach  - Keep bed low and locked with side rails adjusted as appropriate  - Keep care items and personal belongings within reach  - Initiate and maintain comfort rounds  - Make Fall Risk Sign visible to staff  - Apply yellow socks and bracelet for high fall risk patients  - Consider moving patient to room near nurses station  Outcome: Progressing

## 2022-08-06 NOTE — PROGRESS NOTES
[place]  Progress Note - Sumit Sickle 1950, 73 y o  male MRN: [id number]  Unit/Bed#: -01 SDU Encounter: [id number]  Primary Care Provider: Dave Samuels MD   Date and time admitted to hospital: 8/1/2022  8:27 PM    Hyperkalemia  Assessment & Plan  Admitted with potassium of 5 5  Resolved    Stage 3b chronic kidney disease [facility])  Assessment & Plan  Lab Results   Component Value Date    EGFR 13 08/06/2022    EGFR 12 08/05/2022    EGFR 11 08/04/2022    CREATININE 4 27 (H) 08/06/2022    CREATININE 4 38 (H) 08/05/2022    CREATININE 4 72 (H) 08/04/2022   See above    Anemia  Assessment & Plan  Patient has history of anemia in setting of CKD/multiple myeloma  Hemoglobin this morning is 7 7  Iron panel reviewed  GI on board  Stool for occult blood  Received 1 unit of packed red cell transfusion on August 5th  On Protonix  Trend H&H q 8 hours and transfuse if hemoglobin less than 7      Ambulatory dysfunction  Assessment & Plan  P T /OT consult  Has history of left AKA    Chronic obstructive pulmonary disease, unspecified ([facility] [address] )  Assessment & Plan  Continue Flonase and use Breo instead of Advair   Stable at this time    Multiple myeloma [facility])  Assessment & Plan  Last chemo infusion was on July 26  Continue outpatient follow-up    DORA (acute kidney injury) [facility])  Assessment & Plan  Patient with DORA on CKD stage IV in the setting of nausea, vomiting and diarrhea and poor oral intake  Patient admitted with Creatinine elevated at 5 78   Creatinine this morning is 4 27  Most likely due to diarrhea and vomiting  Baseline creatinine in low 2s  Nephrology on board  IV fluids are discontinued  Nephrology ordered diuretics yesterday  Avoid hypotension/nephrotoxins medication/NSAIDs  Trend BMP    NALLELY (obstructive sleep apnea)  Assessment & Plan  History of obstructive sleep apnea with noncompliance with CPAP    GERD (gastroesophageal reflux disease)  Assessment & Plan  On Protonix    Morbid obesity ([facility] [address] )  Assessment & Plan  Encourage weight loss and lifestyle modification    Chronic atrial fibrillation [facility])  Assessment & Plan  Patient is on Toprol-XL and Coumadin at home  Hold for now  Continue to hold Coumadin as INR is supratherapeutic  INR is 5  Monitor PT INR    * Acute diverticulitis  Assessment & Plan  Patient is 68-year-old female with an extensive medical history who has nausea, vomiting and diarrhea with decreased p o  Intake over the last 4 days prior to admission  · CT scan - acute sigmoid diverticulitis with intrahepatic and extrahepatic portal venous air is standing for ischemic bowel no pneumatosis  · Surgery and GI on board  · Received 2 L of IV bolus in the ED  · Continue on IV isolyte  · Diet per GI recommendation  · On Rocephin and Flagyl  · Stool for C diff is negative  Stool cultures are negative  · Pain management p r n  · Antiemetics p r n  · As per surgery no surgical intervention needed at this time  · GI on board  · Trend WBC and fever curve          Labs & Imaging: I have personally reviewed pertinent reports  VTE Prophylaxis: in place  Code Status:   Level 1 - Full Code    Patient Centered Rounds: I have performed bedside rounds with nursing staff today  Discussions with Specialists or Other Care Team Provider: GI    Current Length of Stay: 5 day(s)    Current Patient Status: Inpatient   Certification Statement: The patient will continue to require additional inpatient hospital stay due to see my assessment and plan  Subjective:   Patient is seen and examined at bedside  States he is feeling better  Has diarrhea  Afebrile  All other ROS are negative  Objective:    Vitals: Blood pressure 115/59, pulse 55, temperature 98 5 °F (36 9 °C), temperature source Oral, resp  rate 19, height 6' (1 829 m), weight (!) 139 kg (307 lb 1 6 oz), SpO2 93 %  ,Body mass index is 41 65 kg/m²    SPO2 RA Rest    Flowsheet Row ED to Hosp-Admission (Current) from 8/1/2022 in [address] Int[facility] Unit   SpO2 93 %   SpO2 Activity At Rest   O2 Device None (Room air)   O2 Flow Rate --        I&O:     Intake/Output Summary (Last 24 hours) at 8/6/2022 1213  Last data filed at 8/6/2022 1103  Gross per 24 hour   Intake 2018 75 ml   Output 1060 ml   Net 958 75 ml       Physical Exam:    General- Alert, lying comfortably in bed  Not in any acute distress  Neck- Supple, No JVD  CVS- regular, S1 and S2 normal  Chest- Bilateral Air entry, No rhochi, crackles or wheezing present  Abdomen- soft, nontender, not distended, no guarding or rigidity, BS+  Extremities-  No pedal edema, No calf tenderness  Left AKA  CNS-   Alert, awake and orientedx3  No focal deficits present      Invasive Devices  Report    Peripheral Intravenous Line  Duration           Peripheral IV 08/01/22 Right Antecubital 4 days    Peripheral IV 08/03/22 Right Forearm 3 days          Drain  Duration           Urethral Catheter Non-latex 16 Fr  3 days                      Social History  reviewed  Family History   Problem Relation Age of Onset    Other Mother         CARDIAC DISORDER     Diabetes Mother     Cancer Father     Other Family         CARDIAC DISORDER     Diabetes Family     Cancer Family     Mental illness Family     Kidney disease Sister     Diabetes Sister     reviewed    Meds:  Current Facility-Administered Medications   Medication Dose Route Frequency Provider Last Rate Last Admin    acetaminophen (TYLENOL) tablet 650 mg  650 mg Oral Q6H PRN Vahid Moon MD   650 mg at 08/03/22 2220    cefTRIAXone (ROCEPHIN) IVPB (premix in dextrose) 2,000 mg 50 mL  2,000 mg Intravenous Q24H Vahid Moon  mL/hr at 08/06/22 0835 2,000 mg at 08/06/22 0835    fluticasone (FLONASE) 50 mcg/act nasal spray 1 spray  1 spray Each Nare BID Julia Parr PA-C   1 spray at 08/06/22 0836    fluticasone-vilanterol (BREO ELLIPTA) 200-25 MCG/INH inhaler 1 puff  1 puff Inhalation Daily Julia Parr PA-C   1 puff at 08/06/22 0835    insulin lispro (HumaLOG) 100 units/mL subcutaneous injection 1-5 Units  1-5 Units Subcutaneous HS Jesica Santana PA-C   1 Units at 08/05/22 2043    insulin lispro (HumaLOG) 100 units/mL subcutaneous injection 2-12 Units  2-12 Units Subcutaneous TID [facility] [place] Kya Delgado MD   4 Units at 08/06/22 1124    loperamide (IMODIUM) capsule 2 mg  2 mg Oral Q2H PRN Frankie FOURNIER PA-C   2 mg at 08/06/22 1212    metoprolol tartrate (LOPRESSOR) partial tablet 12 5 mg  12 5 mg Oral Q12H [address] Yue Carcamo PA-C   12 5 mg at 08/05/22 2040    metroNIDAZOLE (FLAGYL) IVPB (premix) 500 mg 100 mL  500 mg Intravenous Q8H Kya Delgado  mL/hr at 08/06/22 1005 500 mg at 08/06/22 1005    ondansetron (ZOFRAN) injection 4 mg  4 mg Intravenous Q6H PRN Kya Delgado MD        pantoprazole (PROTONIX) injection 40 mg  40 mg Intravenous Q24H [address] Julia Parr PA-C   40 mg at 08/06/22 0835      Medications Prior to Admission   Medication    acetaminophen (TYLENOL) 500 mg tablet    albuterol (PROVENTIL HFA,VENTOLIN HFA) 90 mcg/act inhaler    Alcohol Swabs (ALCOHOL PREP) 70 % PADS    allopurinol (ZYLOPRIM) 300 mg tablet    ammonium lactate (LAC-HYDRIN) 12 % lotion    atorvastatin (LIPITOR) 40 mg tablet    BD AUTOSHIELD DUO 30G X 5 MM MISC    BD INSULIN SYRINGE U/F 31G X 5/16" 0 5 ML MISC    BD PEN NEEDLE HILARIO U/F 32G X 4 MM MISC    bisacodyl (DULCOLAX) 10 mg suppository    bisacodyl (DULCOLAX) 5 mg EC tablet    bisacodyl (DULCOLAX) 5 mg EC tablet    bortezomib (VELCADE)    clotrimazole (LOTRIMIN) 1 % cream    colchicine (COLCRYS) 0 6 mg tablet    docusate sodium (COLACE) 100 mg capsule    Easy Touch Safety Lancets 28G MISC    fluticasone (FLONASE) 50 mcg/act nasal spray    fluticasone-salmeterol (ADVAIR DISKUS, WIXELA INHUB) 250-50 mcg/dose inhaler    insulin glargine (Lantus SoloStar) 100 units/mL injection pen    liraglutide (Victoza) injection    loperamide (IMODIUM) 2 mg capsule    magnesium hydroxide (MILK OF MAGNESIA) 400 mg/5 mL oral suspension    Melatonin 5 MG TABS    metFORMIN (GLUCOPHAGE) 1000 MG tablet    metoprolol succinate (TOPROL-XL) 25 mg 24 hr tablet    multivitamin-minerals therapeutic (THERA-M)    NOVOLOG FLEXPEN 100 units/mL SOPN    nystatin (MYCOSTATIN) powder    omeprazole (PriLOSEC) 40 MG capsule    ondansetron (ZOFRAN) 4 mg tablet    polyethylene glycol (GOLYTELY) 4000 mL solution    polyethylene glycol (GOLYTELY) 4000 mL solution    polyethylene glycol (MIRALAX) 17 g packet    potassium chloride (K-DUR,KLOR-CON) 20 mEq tablet    torsemide (DEMADEX) 20 mg tablet    warfarin (COUMADIN) 1 mg tablet    warfarin (COUMADIN) 6 mg tablet       Labs:  Results from last 7 days   Lab Units 08/06/22  0834 08/06/22  0357 08/06/22  0049 08/05/22  0905 08/05/22  0455 08/04/22  1401 08/04/22  0546   WBC Thousand/uL  --  5 94  --   --  5 86  --  7 94   HEMOGLOBIN g/dL 7 7* 7 5* 7 7*   < > 6 9*   < > 7 1*  7 1*   HEMATOCRIT % 24 4* 23 5* 24 2*   < > 22 4*   < > 21 8*  22 1*   PLATELETS Thousands/uL  --  233  --   --  209  --  169   NEUTROS PCT %  --  76*  --   --  71  --  80*   LYMPHS PCT %  --  12*  --   --  16  --  9*   MONOS PCT %  --  7  --   --  9  --  8   EOS PCT %  --  3  --   --  3  --  2    < > = values in this interval not displayed       Results from last 7 days   Lab Units 08/06/22  0357 08/05/22  0455 08/04/22  0546 08/03/22  1443 08/03/22  0512 08/02/22  1622 08/02/22  0951 08/02/22  0423 08/02/22  0109 08/01/22  2342   POTASSIUM mmol/L 3 3* 3 6 3 5   < > 4 1   < > 4 8 5 1   < >  --    CHLORIDE mmol/L 108 107 109*   < > 112*   < > 116* 116*   < >  --    CO2 mmol/L 24 24 26   < > 18*   < > 14* 14*   < >  --    CO2, I-STAT mmol/L  --   --   --   --   --   --   --   --   --  11*   BUN mg/dL 69* 70* 75*   < > 82*   < > 91* 89*   < >  --    CREATININE mg/dL 4 27* 4 38* 4 72*   < > 5 02*   < > 5 38* 5 32* < >  --    CALCIUM mg/dL 8 1* 8 1* 8 2*   < > 8 1*   < > 8 2* 8 2*   < >  --    ALK PHOS U/L  --   --   --   --  90  --  100 101   < >  --    ALT U/L  --   --   --   --  17  --  21 21   < >  --    AST U/L  --   --   --   --  14  --  18 14   < >  --    GLUCOSE, ISTAT mg/dl  --   --   --   --   --   --   --   --   --  64*    < > = values in this interval not displayed  Lab Results   Component Value Date    TROPONINI 5 19 (H) 06/27/2020    TROPONINI 5 85 (H) 06/27/2020    TROPONINI 6 35 (H) 06/27/2020    CKMB 4 1 06/25/2020    CKTOTAL 271 06/25/2020    CKTOTAL 53 03/23/2018    CKTOTAL 55 10/08/2017     Results from last 7 days   Lab Units 08/05/22  0455 08/04/22  0546 08/03/22  0512   INR  5 06* 4 64* 3 59*     Lab Results   Component Value Date    BLOODCX No Growth After 5 Days  10/04/2021    BLOODCX No Growth After 5 Days  10/04/2021    BLOODCX No Growth After 5 Days  06/25/2020    BLOODCX No Growth After 5 Days  06/25/2020    URINECX >100,000 cfu/ml Escherichia coli (A) 12/05/2019    WOUNDCULT 4+ Growth of Proteus mirabilis (A) 07/25/2019    WOUNDCULT 4+ Growth of  07/25/2019    WOUNDCULT (A) 07/25/2019     4+ Growth of Methicillin Resistant Staphylococcus aureus    WOUNDCULT 1+ Growth of Proteus mirabilis (A) 07/25/2019    WOUNDCULT 4+ Growth of  07/25/2019    SPUTUMCULTUR 4+ Growth of  07/11/2020    SPUTUMCULTUR 1+ Growth of Aspergillus fumigatus (A) 07/11/2020         Imaging:  Results for orders placed during the hospital encounter of 08/01/22    XR chest portable    Narrative  CHEST    INDICATION:   Shortness of breath rule out CHF  COMPARISON:  8/2/2022    EXAM PERFORMED/VIEWS:  XR CHEST PORTABLE      FINDINGS:    Unchanged cardiomegaly  Vascular congestion and bilateral pleural effusions likely on the basis of CHF  Osseous structures appear within normal limits for patient age      Impression  Mild central vascular congestion with probable small pleural effusions in keeping with CHF       Workstation performed: [id number]    Results for orders placed during the hospital encounter of 10/04/21    XR chest pa & lateral    Narrative  CHEST    INDICATION:   follow up lung consolidations  COMPARISON:  CTA chest/abdomen/pelvis 10/4/2021  Chest radiograph 7/8/2020  EXAM PERFORMED/VIEWS:  XR CHEST PA & LATERAL      FINDINGS:    Heart shadow is enlarged but unchanged from prior exam     Again seen is dense left lower lobe/retrocardiac consolidation with ill-defined airspace opacities in the lingula and right lower lobe, similar to recent chest CT  Trace left pleural effusion  No pneumothorax  Osseous structures appear within normal limits for patient age  Impression  Stable left lower lobe/retrocardiac consolidation and lingular and right lower lobe ill-defined airspace opacities  Trace left pleural effusion          Workstation performed: [id number]      Last 24 Hours Medication List:   Current Facility-Administered Medications   Medication Dose Route Frequency Provider Last Rate    acetaminophen  650 mg Oral Q6H PRN Taty Fofana MD      cefTRIAXone  2,000 mg Intravenous Q24H Taty Fofana MD 2,000 mg (08/06/22 0835)    fluticasone  1 spray Each Nare BID Julia Parr PA-C      fluticasone-vilanterol  1 puff Inhalation Daily Julia Parr PA-C      insulin lispro  1-5 Units Subcutaneous HS Jesica Santana PA-C      insulin lispro  2-12 Units Subcutaneous TID AC Taty Fofana MD      loperamide  2 mg Oral Q2H PRN Kimberli FOURNIER PA-C      metoprolol tartrate  12 5 mg Oral Q12H [address] Victor M Nieto PA-C      metroNIDAZOLE  500 mg Intravenous Q8H Taty Fofana  mg (08/06/22 1005)    ondansetron  4 mg Intravenous Q6H PRN Taty Fofana MD      pantoprazole  40 mg Intravenous Q24H [address] Julia Parr PA-C          Today, Patient Was Seen By: Taty Fofana MD    ** Please Note: Dictation voice to text software may have been used in the creation of this document   **

## 2022-08-06 NOTE — PLAN OF CARE
Problem: MOBILITY - ADULT  Goal: Maintain or return to baseline ADL function  Description: INTERVENTIONS:  -  Assess patient's ability to carry out ADLs; assess patient's baseline for ADL function and identify physical deficits which impact ability to perform ADLs (bathing, care of mouth/teeth, toileting, grooming, dressing, etc )  - Assess/evaluate cause of self-care deficits   - Assess range of motion  - Assess patient's mobility; develop plan if impaired  - Assess patient's need for assistive devices and provide as appropriate  - Encourage maximum independence but intervene and supervise when necessary  - Involve family in performance of ADLs  - Assess for home care needs following discharge   - Consider OT consult to assist with ADL evaluation and planning for discharge  - Provide patient education as appropriate  Outcome: Progressing  Goal: Maintains/Returns to pre admission functional level  Description: INTERVENTIONS:  - Perform BMAT or MOVE assessment daily    - Set and communicate daily mobility goal to care team and patient/family/caregiver     - Collaborate with rehabilitation services on mobility goals if consulted- Out of bed for toileting  - Record patient progress and toleration of activity level   Outcome: Progressing     Problem: Prexisting or High Potential for Compromised Skin Integrity  Goal: Skin integrity is maintained or improved  Description: INTERVENTIONS:  - Identify patients at risk for skin breakdown  - Assess and monitor skin integrity  - Assess and monitor nutrition and hydration status  - Monitor labs   - Assess for incontinence   - Turn and reposition patient  - Assist with mobility/ambulation  - Relieve pressure over bony prominences  - Avoid friction and shearing  - Provide appropriate hygiene as needed including keeping skin clean and dry  - Evaluate need for skin moisturizer/barrier cream  - Collaborate with interdisciplinary team   - Patient/family teaching  - Consider wound care consult   Outcome: Progressing     Problem: PAIN - ADULT  Goal: Verbalizes/displays adequate comfort level or baseline comfort level  Description: Interventions:  - Encourage patient to monitor pain and request assistance  - Assess pain using appropriate pain scale  - Administer analgesics based on type and severity of pain and evaluate response  - Implement non-pharmacological measures as appropriate and evaluate response  - Consider cultural and social influences on pain and pain management  - Notify physician/advanced practitioner if interventions unsuccessful or patient reports new pain  Outcome: Progressing     Problem: INFECTION - ADULT  Goal: Absence or prevention of progression during hospitalization  Description: INTERVENTIONS:  - Assess and monitor for signs and symptoms of infection  - Monitor lab/diagnostic results  - Monitor all insertion sites, i e  indwelling lines, tubes, and drains  - Monitor endotracheal if appropriate and nasal secretions for changes in amount and color  - [place] appropriate cooling/warming therapies per order  - Administer medications as ordered  - Instruct and encourage patient and family to use good hand hygiene technique  - Identify and instruct in appropriate isolation precautions for identified infection/condition  Outcome: Progressing  Goal: Absence of fever/infection during neutropenic period  Description: INTERVENTIONS:  - Monitor WBC    Outcome: Progressing     Problem: SAFETY ADULT  Goal: Maintain or return to baseline ADL function  Description: INTERVENTIONS:  -  Assess patient's ability to carry out ADLs; assess patient's baseline for ADL function and identify physical deficits which impact ability to perform ADLs (bathing, care of mouth/teeth, toileting, grooming, dressing, etc )  - Assess/evaluate cause of self-care deficits   - Assess range of motion  - Assess patient's mobility; develop plan if impaired  - Assess patient's need for assistive devices and provide as appropriate  - Encourage maximum independence but intervene and supervise when necessary  - Involve family in performance of ADLs  - Assess for home care needs following discharge   - Consider OT consult to assist with ADL evaluation and planning for discharge  - Provide patient education as appropriate  Outcome: Progressing  Goal: Maintains/Returns to pre admission functional level  Description: INTERVENTIONS:  - Perform BMAT or MOVE assessment daily    - Set and communicate daily mobility goal to care team and patient/family/caregiver     - Collaborate with rehabilitation services on mobility goals if consulted  - Out of bed for toileting  - Record patient progress and toleration of activity level   Outcome: Progressing  Goal: Patient will remain free of falls  Description: INTERVENTIONS:  - Educate patient/family on patient safety including physical limitations  - Instruct patient to call for assistance with activity   - Consult OT/PT to assist with strengthening/mobility   - Keep Call bell within reach  - Keep bed low and locked with side rails adjusted as appropriate  - Keep care items and personal belongings within reach  - Initiate and maintain comfort rounds  - Make Fall Risk Sign visible to staff  - Apply yellow socks and bracelet for high fall risk patients  - Consider moving patient to room near nurses station  Outcome: Progressing     Problem: DISCHARGE PLANNING  Goal: Discharge to home or other facility with appropriate resources  Description: INTERVENTIONS:  - Identify barriers to discharge w/patient and caregiver  - Arrange for needed discharge resources and transportation as appropriate  - Identify discharge learning needs (meds, wound care, etc )  - Arrange for interpretive services to assist at discharge as needed  - Refer to Case Management Department for coordinating discharge planning if the patient needs post-hospital services based on physician/advanced practitioner order or complex needs related to functional status, cognitive ability, or social support system  Outcome: Progressing     Problem: Knowledge Deficit  Goal: Patient/family/caregiver demonstrates understanding of disease process, treatment plan, medications, and discharge instructions  Description: Complete learning assessment and assess knowledge base    Interventions:  - Provide teaching at level of understanding  - Provide teaching via preferred learning methods  Outcome: Progressing     Problem: Potential for Falls  Goal: Patient will remain free of falls  Description: INTERVENTIONS:  - Educate patient/family on patient safety including physical limitations  - Instruct patient to call for assistance with activity   - Consult OT/PT to assist with strengthening/mobility   - Keep Call bell within reach  - Keep bed low and locked with side rails adjusted as appropriate  - Keep care items and personal belongings within reach  - Initiate and maintain comfort rounds  - Make Fall Risk Sign visible to staff  - Apply yellow socks and bracelet for high fall risk patients  - Consider moving patient to room near nurses station  Outcome: Progressing

## 2022-08-06 NOTE — PROGRESS NOTES
Progress note - Gastroenterology   Catie Kerr 73 y o  male MRN: [id number]  Unit/Bed#: -01 SDU Encounter: [id number]    ASSESSMENT and PLAN      1  Acute sigmoid diverticulitis  2  Acute on chronic kidney disease  3  Multiple myeloma on chemotherapy  4  Afib on Coumadin at home, currently on hold as INR is still supratherapeutic 5 06 8/5/22   5  Chronic normocytic anemia, mild bleeding noted at mehta site today  No overt GIB  6  Last colonoscopy 7/25/22     - Continue abx for diverticulitis for total of 10 days  - tolerating diet  - having some loose stools, CDiff negative  OK to do imodium prn but would avoid getting constipation given recent diverticulitis  - trend H&H and transfuse, no overt GIB  multiple etiologies to be anemic  Cont to monitor for overt bleeding   - F/U INR tomorrow AM   - mild iron deficiency on labs, consider IV iron    Chief Complaint   Patient presents with    Vomiting     +N/V/D starting 7/29  Pt had a colonoscopy 7/25, 7/26 had a chemo tx  Elevated kidney levels on OP bloodwork  SUBJECTIVE/HPI   No complaints    /59 (BP Location: Left arm)   Pulse 55   Temp 98 5 °F (36 9 °C) (Oral)   Resp 19   Ht 6' (1 829 m)   Wt (!) 139 kg (307 lb 1 6 oz)   SpO2 93%   BMI 41 65 kg/m²     PHYSICALEXAM  General appearance: alert, appears stated age and cooperative  Eyes: JENNIE, EOMI, no scleral icterus   Head: Normocephalic, without obvious abnormality, atraumatic  Lungs: clear to auscultation bilaterally, no c/w/r  Heart: regular rate and rhythm, S1, S2 normal, no murmur, click, rub or gallop  Abdomen: soft, non-tender, non-distended; bowel sounds normal; no masses,  no organomegaly  Extremities: extremities normal, atraumatic, no clubbing or cyanosis   No LE edema  Neurologic: Grossly normal, AAOx3, no asterixis    Lab Results   Component Value Date    GLUCOSE 64 (L) 08/01/2022    CALCIUM 8 1 (L) 08/06/2022     01/15/2018    K 3 3 (L) 08/06/2022    CO2 24 08/06/2022     08/06/2022    BUN 69 (H) 08/06/2022    CREATININE 4 27 (H) 08/06/2022     Lab Results   Component Value Date    WBC 5 94 08/06/2022    HGB 7 7 (L) 08/06/2022    HCT 24 4 (L) 08/06/2022    MCV 84 08/06/2022     08/06/2022     Lab Results   Component Value Date    ALT 17 08/03/2022    AST 14 08/03/2022     (H) 11/03/2019    ALKPHOS 90 08/03/2022    BILITOT 0 6 01/15/2018     No results found for: AMYLASE  Lab Results   Component Value Date    LIPASE 338 08/02/2022     Lab Results   Component Value Date    IRON 17 (L) 08/04/2022    TIBC 206 (L) 08/04/2022    FERRITIN 165 08/04/2022     Lab Results   Component Value Date    INR 5 06 (HH) 08/05/2022

## 2022-08-06 NOTE — ASSESSMENT & PLAN NOTE
Patient has history of anemia in setting of CKD/multiple myeloma  Hemoglobin this morning is 7 7  Iron panel reviewed  GI on board  Stool for occult blood  Received 1 unit of packed red cell transfusion on August 5th  On Protonix  Trend H&H q 8 hours and transfuse if hemoglobin less than 7

## 2022-08-06 NOTE — PLAN OF CARE
Problem: MOBILITY - ADULT  Goal: Maintain or return to baseline ADL function  Description: INTERVENTIONS:  -  Assess patient's ability to carry out ADLs; assess patient's baseline for ADL function and identify physical deficits which impact ability to perform ADLs (bathing, care of mouth/teeth, toileting, grooming, dressing, etc )  - Assess/evaluate cause of self-care deficits   - Assess range of motion  - Assess patient's mobility; develop plan if impaired  - Assess patient's need for assistive devices and provide as appropriate  - Encourage maximum independence but intervene and supervise when necessary  - Involve family in performance of ADLs  - Assess for home care needs following discharge   - Consider OT consult to assist with ADL evaluation and planning for discharge  - Provide patient education as appropriate  Outcome: Progressing  Goal: Maintains/Returns to pre admission functional level  Description: INTERVENTIONS:  - Perform BMAT or MOVE assessment daily    - Set and communicate daily mobility goal to care team and patient/family/caregiver     - Collaborate with rehabilitation services on mobility goals if consulted  - Out of bed for toileting  - Record patient progress and toleration of activity level   Outcome: Progressing     Problem: Prexisting or High Potential for Compromised Skin Integrity  Goal: Skin integrity is maintained or improved  Description: INTERVENTIONS:  - Identify patients at risk for skin breakdown  - Assess and monitor skin integrity  - Assess and monitor nutrition and hydration status  - Monitor labs   - Assess for incontinence   - Turn and reposition patient  - Assist with mobility/ambulation  - Relieve pressure over bony prominences  - Avoid friction and shearing  - Provide appropriate hygiene as needed including keeping skin clean and dry  - Evaluate need for skin moisturizer/barrier cream  - Collaborate with interdisciplinary team   - Patient/family teaching  - Consider wound care consult   Outcome: Progressing     Problem: PAIN - ADULT  Goal: Verbalizes/displays adequate comfort level or baseline comfort level  Description: Interventions:  - Encourage patient to monitor pain and request assistance  - Assess pain using appropriate pain scale  - Administer analgesics based on type and severity of pain and evaluate response  - Implement non-pharmacological measures as appropriate and evaluate response  - Consider cultural and social influences on pain and pain management  - Notify physician/advanced practitioner if interventions unsuccessful or patient reports new pain  Outcome: Progressing     Problem: INFECTION - ADULT  Goal: Absence or prevention of progression during hospitalization  Description: INTERVENTIONS:  - Assess and monitor for signs and symptoms of infection  - Monitor lab/diagnostic results  - Monitor all insertion sites, i e  indwelling lines, tubes, and drains  - Monitor endotracheal if appropriate and nasal secretions for changes in amount and color  - [place] appropriate cooling/warming therapies per order  - Administer medications as ordered  - Instruct and encourage patient and family to use good hand hygiene technique  - Identify and instruct in appropriate isolation precautions for identified infection/condition  Outcome: Progressing  Goal: Absence of fever/infection during neutropenic period  Description: INTERVENTIONS:  - Monitor WBC    Outcome: Progressing     Problem: SAFETY ADULT  Goal: Maintain or return to baseline ADL function  Description: INTERVENTIONS:  -  Assess patient's ability to carry out ADLs; assess patient's baseline for ADL function and identify physical deficits which impact ability to perform ADLs (bathing, care of mouth/teeth, toileting, grooming, dressing, etc )  - Assess/evaluate cause of self-care deficits   - Assess range of motion  - Assess patient's mobility; develop plan if impaired  - Assess patient's need for assistive devices and provide as appropriate  - Encourage maximum independence but intervene and supervise when necessary  - Involve family in performance of ADLs  - Assess for home care needs following discharge   - Consider OT consult to assist with ADL evaluation and planning for discharge  - Provide patient education as appropriate  Outcome: Progressing  Goal: Maintains/Returns to pre admission functional level  Description: INTERVENTIONS:  - Perform BMAT or MOVE assessment daily    - Set and communicate daily mobility goal to care team and patient/family/caregiver     - Collaborate with rehabilitation services on mobility goals if consulted  - Out of bed for toileting  - Record patient progress and toleration of activity level   Outcome: Progressing  Goal: Patient will remain free of falls  Description: INTERVENTIONS:  - Educate patient/family on patient safety including physical limitations  - Instruct patient to call for assistance with activity   - Consult OT/PT to assist with strengthening/mobility   - Keep Call bell within reach  - Keep bed low and locked with side rails adjusted as appropriate  - Keep care items and personal belongings within reach  - Initiate and maintain comfort rounds  - Make Fall Risk Sign visible to staff  - Apply yellow socks and bracelet for high fall risk patients  - Consider moving patient to room near nurses station  Outcome: Progressing     Problem: DISCHARGE PLANNING  Goal: Discharge to home or other facility with appropriate resources  Description: INTERVENTIONS:  - Identify barriers to discharge w/patient and caregiver  - Arrange for needed discharge resources and transportation as appropriate  - Identify discharge learning needs (meds, wound care, etc )  - Arrange for interpretive services to assist at discharge as needed  - Refer to Case Management Department for coordinating discharge planning if the patient needs post-hospital services based on physician/advanced practitioner order or complex needs related to functional status, cognitive ability, or social support system  Outcome: Progressing     Problem: Knowledge Deficit  Goal: Patient/family/caregiver demonstrates understanding of disease process, treatment plan, medications, and discharge instructions  Description: Complete learning assessment and assess knowledge base    Interventions:  - Provide teaching at level of understanding  - Provide teaching via preferred learning methods  Outcome: Progressing     Problem: Potential for Falls  Goal: Patient will remain free of falls  Description: INTERVENTIONS:  - Educate patient/family on patient safety including physical limitations  - Instruct patient to call for assistance with activity   - Consult OT/PT to assist with strengthening/mobility   - Keep Call bell within reach  - Keep bed low and locked with side rails adjusted as appropriate  - Keep care items and personal belongings within reach  - Initiate and maintain comfort rounds  - Make Fall Risk Sign visible to staff    - Apply yellow socks and bracelet for high fall risk patients  - Consider moving patient to room near nurses station  Outcome: Progressing

## 2022-08-06 NOTE — PLAN OF CARE
Problem: MOBILITY - ADULT  Goal: Maintain or return to baseline ADL function  Description: INTERVENTIONS:  -  Assess patient's ability to carry out ADLs; assess patient's baseline for ADL function and identify physical deficits which impact ability to perform ADLs (bathing, care of mouth/teeth, toileting, grooming, dressing, etc )  - Assess/evaluate cause of self-care deficits   - Assess range of motion  - Assess patient's mobility; develop plan if impaired  - Assess patient's need for assistive devices and provide as appropriate  - Encourage maximum independence but intervene and supervise when necessary  - Involve family in performance of ADLs  - Assess for home care needs following discharge   - Consider OT consult to assist with ADL evaluation and planning for discharge  - Provide patient education as appropriate  Outcome: Progressing  Goal: Maintains/Returns to pre admission functional level  Description: INTERVENTIONS:  - Perform BMAT or MOVE assessment daily    - Set and communicate daily mobility goal to care team and patient/family/caregiver     - Collaborate with rehabilitation services on mobility goals if consulted  - Out of bed for toileting  - Record patient progress and toleration of activity level   Outcome: Progressing     Problem: Prexisting or High Potential for Compromised Skin Integrity  Goal: Skin integrity is maintained or improved  Description: INTERVENTIONS:  - Identify patients at risk for skin breakdown  - Assess and monitor skin integrity  - Assess and monitor nutrition and hydration status  - Monitor labs   - Assess for incontinence   - Turn and reposition patient  - Assist with mobility/ambulation  - Relieve pressure over bony prominences  - Avoid friction and shearing  - Provide appropriate hygiene as needed including keeping skin clean and dry  - Evaluate need for skin moisturizer/barrier cream  - Collaborate with interdisciplinary team   - Patient/family teaching  - Consider wound care consult   Outcome: Progressing     Problem: PAIN - ADULT  Goal: Verbalizes/displays adequate comfort level or baseline comfort level  Description: Interventions:  - Encourage patient to monitor pain and request assistance  - Assess pain using appropriate pain scale  - Administer analgesics based on type and severity of pain and evaluate response  - Implement non-pharmacological measures as appropriate and evaluate response  - Consider cultural and social influences on pain and pain management  - Notify physician/advanced practitioner if interventions unsuccessful or patient reports new pain  Outcome: Progressing     Problem: INFECTION - ADULT  Goal: Absence or prevention of progression during hospitalization  Description: INTERVENTIONS:  - Assess and monitor for signs and symptoms of infection  - Monitor lab/diagnostic results  - Monitor all insertion sites, i e  indwelling lines, tubes, and drains  - Monitor endotracheal if appropriate and nasal secretions for changes in amount and color  - [place] appropriate cooling/warming therapies per order  - Administer medications as ordered  - Instruct and encourage patient and family to use good hand hygiene technique  - Identify and instruct in appropriate isolation precautions for identified infection/condition  Outcome: Progressing  Goal: Absence of fever/infection during neutropenic period  Description: INTERVENTIONS:  - Monitor WBC    Outcome: Progressing     Problem: SAFETY ADULT  Goal: Maintain or return to baseline ADL function  Description: INTERVENTIONS:  -  Assess patient's ability to carry out ADLs; assess patient's baseline for ADL function and identify physical deficits which impact ability to perform ADLs (bathing, care of mouth/teeth, toileting, grooming, dressing, etc )  - Assess/evaluate cause of self-care deficits   - Assess range of motion  - Assess patient's mobility; develop plan if impaired  - Assess patient's need for assistive devices and provide as appropriate  - Encourage maximum independence but intervene and supervise when necessary  - Involve family in performance of ADLs  - Assess for home care needs following discharge   - Consider OT consult to assist with ADL evaluation and planning for discharge  - Provide patient education as appropriate  Outcome: Progressing  Goal: Maintains/Returns to pre admission functional level  Description: INTERVENTIONS:  - Perform BMAT or MOVE assessment daily    - Set and communicate daily mobility goal to care team and patient/family/caregiver     - Collaborate with rehabilitation services on mobility goals if consulted  - Out of bed for toileting  - Record patient progress and toleration of activity level   Outcome: Progressing  Goal: Patient will remain free of falls  Description: INTERVENTIONS:  - Educate patient/family on patient safety including physical limitations  - Instruct patient to call for assistance with activity   - Consult OT/PT to assist with strengthening/mobility   - Keep Call bell within reach  - Keep bed low and locked with side rails adjusted as appropriate  - Keep care items and personal belongings within reach  - Initiate and maintain comfort rounds  - Make Fall Risk Sign visible to staff  - Apply yellow socks and bracelet for high fall risk patients  - Consider moving patient to room near nurses station  Outcome: Progressing     Problem: DISCHARGE PLANNING  Goal: Discharge to home or other facility with appropriate resources  Description: INTERVENTIONS:  - Identify barriers to discharge w/patient and caregiver  - Arrange for needed discharge resources and transportation as appropriate  - Identify discharge learning needs (meds, wound care, etc )  - Arrange for interpretive services to assist at discharge as needed  - Refer to Case Management Department for coordinating discharge planning if the patient needs post-hospital services based on physician/advanced practitioner order or complex needs related to functional status, cognitive ability, or social support system  Outcome: Progressing     Problem: Knowledge Deficit  Goal: Patient/family/caregiver demonstrates understanding of disease process, treatment plan, medications, and discharge instructions  Description: Complete learning assessment and assess knowledge base    Interventions:  - Provide teaching at level of understanding  - Provide teaching via preferred learning methods  Outcome: Progressing     Problem: Potential for Falls  Goal: Patient will remain free of falls  Description: INTERVENTIONS:  - Educate patient/family on patient safety including physical limitations  - Instruct patient to call for assistance with activity   - Consult OT/PT to assist with strengthening/mobility   - Keep Call bell within reach  - Keep bed low and locked with side rails adjusted as appropriate  - Keep care items and personal belongings within reach  - Initiate and maintain comfort rounds  - Make Fall Risk Sign visible to staff  - Apply yellow socks and bracelet for high fall risk patients  - Consider moving patient to room near nurses station  Outcome: Progressing

## 2022-08-06 NOTE — ASSESSMENT & PLAN NOTE
Patient is 61-year-old female with an extensive medical history who has nausea, vomiting and diarrhea with decreased p o  Intake over the last 4 days prior to admission  · CT scan - acute sigmoid diverticulitis with intrahepatic and extrahepatic portal venous air is standing for ischemic bowel no pneumatosis  · Surgery and GI on board  · Received 2 L of IV bolus in the ED  · Continue on IV isolyte  · Diet per GI recommendation  · On Rocephin and Flagyl  · Stool for C diff is negative  Stool cultures are negative  · Pain management p r n  · Antiemetics p r n    · As per surgery no surgical intervention needed at this time  · GI on board  · Trend WBC and fever curve

## 2022-08-06 NOTE — PROGRESS NOTES
Follow up Consultation    Nephrology   Khalif Jay 73 y o  male MRN: [id number]  Unit/Bed#: -01 SDU Encounter: [id number]      Physician Requesting Consult: Matias Bryan MD        ASSESSMENT/PLAN:  30-year-old male with multiple comorbidities including diabetes, CHF, multiple myeloma, AFib, peripheral vascular disease and CKD stage 4 admitted with nausea vomiting and diarrhea  Nephrology following for acute kidney injury  Acute kidney injury (POA) on CKD stage 4:  DORA multifactorial most likely secondary to prerenal azotemia secondary to hypovolemia plus sepsis with subsequent ATN  After review of records [facility] as well as Care everywhere it appears that the patient has a baseline Creatinine of 1 8-2 2 mg/dL  patient was admitted with a creatinine of 5 78 mg/dL on 08/01/2022  Peak creatinine this hospitalization at 5 78 on 08/01/2022  patient's creatinine today is at 4 27 mg/dL, continues to improve  Hold further IV fluids for now  Hold further diuretics for now if patient with worsening shortness of breath may give Lasix 80 mg IV x1  check BMP in a m  Await renal recovery  Optimize hemodynamic status to avoid delay in renal recovery  Place on a renal diet when allowed diet order  Avoid nephrotoxins, adjust meds to appropriate GFR  Strict I/O  Daily weights  Urinary retention protocol if patient does not have a [place]  will need to set up patient for follow up with Nephrology as an outpatient post hospitalization  for nephrology as an outpatient patient follows up with Dr white     Blood pressure/hypertension:  current medications:  Metoprolol 12 5 mg p o  B i d   recommendations:  Hold diuretics for now unless patient with worsening shortness of breath and can give Lasix 80 mg IV x1  Optimize hemodynamics  Maintain MAP > 65mmHg  Avoid BP fluctuations      H/H/anemia:  most recent hemoglobin at 7 7 grams/deciliter  maintain hemoglobin greater than 8 grams/deciliter  Management primary team    Acid-base electrolytes:    Electrolytes:      Hypokalemia:  Most recent potassium 3 3 mEq  Supplement  Recheck BMP    Hyperphosphatemia:  Most recent phosphorus at 5 4  Continue monitor for now    Acid-base:    Most recent bicarb at 24 stable    Other medical problems:  Diabetes:  Management per primary team   On insulin  CHF:  Most recent echo with EF of 45%  Status post diuretics on 2022 hold further IV fluids for now  Hold diuretics for today  Diverticulitis:  Management per primary team, on Rocephin, Flagyl      Thanks for the consult  Will continue to follow  Please call with questions/ concerns  Above-mentioned orders and Plan in terms of acute kidney injury was discussed with the team in depth via tiger text    Jon Hudson MD, FASN, 2022, 12:46 PM              Objective :   Patient seen and examined in his room no overnight events hemodynamically stable remains afebrile  Urine output close to 975 cc plus last 24 hours 210 cc thus far this a m  Status post Lasix 80 mg IV x1 yesterday  Reports have some diarrhea earlier appetites okay no complaints overall breathing feels better  PHYSICAL EXAM  /59 (BP Location: Left arm)   Pulse 55   Temp 98 5 °F (36 9 °C) (Oral)   Resp 19   Ht 6' (1 829 m)   Wt (!) 139 kg (307 lb 1 6 oz)   SpO2 93%   BMI 41 65 kg/m²   Temp (24hrs), Av 2 °F (36 8 °C), Min:98 1 °F (36 7 °C), Max:98 5 °F (36 9 °C)        Intake/Output Summary (Last 24 hours) at 2022 1246  Last data filed at 2022 1103  Gross per 24 hour   Intake 2018 75 ml   Output 1060 ml   Net 958 75 ml       I/O last 24 hours: In: 2812 5 [P O :1630; I V :682 5; Blood:350; IV Piggyback:150]  Out: 1185 [Urine:1185]      Current Weight: Weight - Scale: (!) 139 kg (307 lb 1 6 oz)  First Weight: Weight - Scale: 132 kg (290 lb)  Physical Exam  Vitals and nursing note reviewed  Constitutional:       General: He is not in acute distress       Appearance: Normal appearance  He is obese  He is not ill-appearing, toxic-appearing or diaphoretic  HENT:      Head: Normocephalic and atraumatic  Mouth/Throat:      Mouth: Mucous membranes are dry  Pharynx: Oropharynx is clear  No oropharyngeal exudate  Eyes:      General: No scleral icterus  Conjunctiva/sclera: Conjunctivae normal    Cardiovascular:      Rate and Rhythm: Normal rate  Heart sounds: Normal heart sounds  No friction rub  Pulmonary:      Effort: Pulmonary effort is normal  No respiratory distress  Breath sounds: No stridor  No wheezing  Abdominal:      General: There is no distension  Palpations: Abdomen is soft  There is no mass  Tenderness: There is no abdominal tenderness  Musculoskeletal:         General: No swelling  Cervical back: Normal range of motion and neck supple  No rigidity  Comments: Left AKA, trace presacral edema the right   Skin:     General: Skin is warm and dry  Coloration: Skin is not jaundiced  Neurological:      General: No focal deficit present  Mental Status: He is alert and oriented to person, place, and time  Psychiatric:         Mood and Affect: Mood normal          Behavior: Behavior normal              Review of Systems   Constitutional: Negative for chills and fatigue  HENT: Negative for congestion  Respiratory: Negative for cough, shortness of breath and wheezing  Cardiovascular: Negative for leg swelling  Gastrointestinal: Positive for diarrhea  Negative for abdominal pain and vomiting  Genitourinary: Negative for hematuria  Musculoskeletal: Negative for back pain  Neurological: Negative for headaches  Psychiatric/Behavioral: Negative for agitation and confusion  All other systems reviewed and are negative        Scheduled Meds:  Current Facility-Administered Medications   Medication Dose Route Frequency Provider Last Rate    acetaminophen  650 mg Oral Q6H PRN MD Jaiden Graves cefTRIAXone  2,000 mg Intravenous Q24H Amy Gamble MD 2,000 mg (08/06/22 0835)    fluticasone  1 spray Each Nare BID Julia Parr PA-C      fluticasone-vilanterol  1 puff Inhalation Daily Julia Parr PA-C      insulin lispro  1-5 Units Subcutaneous HS Jesica JOEY Santana      insulin lispro  2-12 Units Subcutaneous TID AC Amy Gamble MD      loperamide  2 mg Oral Q2H PRN Kimberli FOURNIER PA-C      metoprolol tartrate  12 5 mg Oral Q12H [address] Jose Gonzalez PA-C      metroNIDAZOLE  500 mg Intravenous Q8H Amy Gamble  mg (08/06/22 1005)    ondansetron  4 mg Intravenous Q6H PRN Amy Gamble MD      pantoprazole  40 mg Intravenous Q24H [address] Julia Parr PA-C         PRN Meds:   acetaminophen    loperamide    ondansetron    Continuous Infusions:       Invasive Devices:      Invasive Devices  Report    Peripheral Intravenous Line  Duration           Peripheral IV 08/01/22 Right Antecubital 4 days    Peripheral IV 08/03/22 Right Forearm 3 days          Drain  Duration           Urethral Catheter Non-latex 16 Fr  3 days                  LABORATORY:    Results from last 7 days   Lab Units 08/06/22  0834 08/06/22  0357 08/06/22  0049 08/05/22  1706 08/05/22  0905 08/05/22  0455 08/05/22  0124 08/05/22  0026 08/04/22  1401 08/04/22  0546 08/04/22  0211 08/03/22  1443 08/03/22  0512 08/03/22  0047 08/02/22  1622 08/02/22  0951 08/02/22  0423 08/02/22  0109 08/01/22  2342 08/01/22  2050   WBC [place]/uL  --  5 94  --   --   --  5 86  --   --   --  7 94  --   --  9 29  --   --   --  8 92 10 26*  --  10 44*   HEMOGLOBIN g/dL 7 7* 7 5* 7 7* 6 9* 6 0* 6 9* 6 7* 6 4*   < > 7 1*  7 1*   < > 7 3* 7 3*   < > 7 3*  --  7 5* 8 1*  --  9 0*   I STAT HEMOGLOBIN g/dl  --   --   --   --   --   --   --   --   --   --   --   --   --   --   --   --   --   --  7 5*  --    HEMATOCRIT % 24 4* 23 5* 24 2* 21 9* 19 1* 22 4*  --  20 5*   < > 21 8*  22 1*   < > 22 5* 22 8*   < > 24 4* --  24 4* 26 8*  --  29 4*   HEMATOCRIT, ISTAT %  --   --   --   --   --   --   --   --   --   --   --   --   --   --   --   --   --   --  22*  --    PLATELETS Thousands/uL  --  233  --   --   --  209  --   --   --  169  --   --  165  --   --   --  132* 146*  --  162   POTASSIUM mmol/L  --  3 3*  --   --   --  3 6  --   --   --  3 5  --  3 7 4 1  --  4 4 4 8 5 1 5 4*  --  5 5*   CHLORIDE mmol/L  --  108  --   --   --  107  --   --   --  109*  --  110* 112*  --  115* 116* 116* 117*  --  114*   CO2 mmol/L  --  24  --   --   --  24  --   --   --  26  --  23 18*  --  16* 14* 14* 14*  --  12*   CO2, I-STAT mmol/L  --   --   --   --   --   --   --   --   --   --   --   --   --   --   --   --   --   --  11*  --    BUN mg/dL  --  69*  --   --   --  70*  --   --   --  75*  --  82* 82*  --  87* 91* 89* 92*  --  95*   CREATININE mg/dL  --  4 27*  --   --   --  4 38*  --   --   --  4 72*  --  5 08* 5 02*  --  5 28* 5 38* 5 32* 5 44*  --  5 78*   CALCIUM mg/dL  --  8 1*  --   --   --  8 1*  --   --   --  8 2*  --  8 0* 8 1*  --  8 1* 8 2* 8 2* 8 0*  --  8 8   MAGNESIUM mg/dL  --  1 9  --   --   --   --   --   --   --   --   --   --  1 9  --   --   --  1 9 1 7  --   --    PHOSPHORUS mg/dL  --  5 4*  --   --   --   --   --   --   --  4 8*  --   --   --   --   --   --  5 1* 5 5*  --   --    GLUCOSE, ISTAT mg/dl  --   --   --   --   --   --   --   --   --   --   --   --   --   --   --   --   --   --  64*  --     < > = values in this interval not displayed  rest all reviewed    RADIOLOGY:  XR chest portable   Final Result by Keisha Pillai MD (08/04 1700)      Mild central vascular congestion with probable small pleural effusions in keeping with CHF  Workstation performed: [id number]         XR chest portable   Final Result by Katherine Bullock MD (08/04 6191)      Persistent enlargement of the cardiomediastinal silhouette  No focal consolidation, pleural effusion or pneumothorax    The left basilar opacities evident on the prior CT are obscured by the heart on this portable chest radiograph  Workstation performed: [id number]         CT abdomen pelvis wo contrast   Final Result by Mirta Allen MD (08/01 2230)      1  Acute sigmoid diverticulitis with intrahepatic and extrahepatic portal venous air, concerning for ischemic bowel  No pneumatosis is identified  2   Left basilar opacities concerning for pneumonia      I personally discussed impression 1 with FRANTZ Bullock on 8/1/2022 at 10:25 PM       Workstation performed: [id number]           Rest all reviewed    Portions of the record may have been created with voice recognition software  Occasional wrong word or "sound a like" substitutions may have occurred due to the inherent limitations of voice recognition software  Read the chart carefully and recognize, using context, where substitutions have occurred  If you have any questions, please contact the dictating provider

## 2022-08-06 NOTE — ASSESSMENT & PLAN NOTE
Lab Results   Component Value Date    EGFR 13 08/06/2022    EGFR 12 08/05/2022    EGFR 11 08/04/2022    CREATININE 4 27 (H) 08/06/2022    CREATININE 4 38 (H) 08/05/2022    CREATININE 4 72 (H) 08/04/2022   See above

## 2022-08-07 LAB
ABO GROUP BLD: NORMAL
ANION GAP SERPL CALCULATED.3IONS-SCNC: 10 MMOL/L (ref 4–13)
ANION GAP SERPL CALCULATED.3IONS-SCNC: 12 MMOL/L (ref 4–13)
BASOPHILS # BLD AUTO: 0.03 THOUSANDS/ΜL (ref 0–0.1)
BASOPHILS NFR BLD AUTO: 1 % (ref 0–1)
BLD GP AB SCN SERPL QL: NEGATIVE
BUN SERPL-MCNC: 59 MG/DL (ref 5–25)
BUN SERPL-MCNC: 60 MG/DL (ref 5–25)
CA-I BLD-SCNC: 0.96 MMOL/L (ref 1.12–1.32)
CALCIUM SERPL-MCNC: 8.2 MG/DL (ref 8.3–10.1)
CALCIUM SERPL-MCNC: 8.3 MG/DL (ref 8.3–10.1)
CHLORIDE SERPL-SCNC: 110 MMOL/L (ref 96–108)
CHLORIDE SERPL-SCNC: 110 MMOL/L (ref 96–108)
CO2 SERPL-SCNC: 21 MMOL/L (ref 21–32)
CO2 SERPL-SCNC: 24 MMOL/L (ref 21–32)
CREAT SERPL-MCNC: 3.79 MG/DL (ref 0.6–1.3)
CREAT SERPL-MCNC: 3.96 MG/DL (ref 0.6–1.3)
EOSINOPHIL # BLD AUTO: 0.25 THOUSAND/ΜL (ref 0–0.61)
EOSINOPHIL NFR BLD AUTO: 4 % (ref 0–6)
ERYTHROCYTE [DISTWIDTH] IN BLOOD BY AUTOMATED COUNT: 19.5 % (ref 11.6–15.1)
GFR SERPL CREATININE-BSD FRML MDRD: 14 ML/MIN/1.73SQ M
GFR SERPL CREATININE-BSD FRML MDRD: 15 ML/MIN/1.73SQ M
GLUCOSE SERPL-MCNC: 119 MG/DL (ref 65–140)
GLUCOSE SERPL-MCNC: 122 MG/DL (ref 65–140)
GLUCOSE SERPL-MCNC: 170 MG/DL (ref 65–140)
GLUCOSE SERPL-MCNC: 182 MG/DL (ref 65–140)
GLUCOSE SERPL-MCNC: 182 MG/DL (ref 65–140)
GLUCOSE SERPL-MCNC: 363 MG/DL (ref 65–140)
HCT VFR BLD AUTO: 20.3 % (ref 36.5–49.3)
HCT VFR BLD AUTO: 23.5 % (ref 36.5–49.3)
HGB BLD-MCNC: 6.2 G/DL (ref 12–17)
HGB BLD-MCNC: 7.2 G/DL (ref 12–17)
IMM GRANULOCYTES # BLD AUTO: 0.09 THOUSAND/UL (ref 0–0.2)
IMM GRANULOCYTES NFR BLD AUTO: 1 % (ref 0–2)
INR PPP: 2.9 (ref 0.84–1.19)
LYMPHOCYTES # BLD AUTO: 0.87 THOUSANDS/ΜL (ref 0.6–4.47)
LYMPHOCYTES NFR BLD AUTO: 14 % (ref 14–44)
MAGNESIUM SERPL-MCNC: 1.8 MG/DL (ref 1.6–2.6)
MCH RBC QN AUTO: 26.4 PG (ref 26.8–34.3)
MCHC RBC AUTO-ENTMCNC: 30.6 G/DL (ref 31.4–37.4)
MCV RBC AUTO: 86 FL (ref 82–98)
MONOCYTES # BLD AUTO: 0.41 THOUSAND/ΜL (ref 0.17–1.22)
MONOCYTES NFR BLD AUTO: 7 % (ref 4–12)
NEUTROPHILS # BLD AUTO: 4.63 THOUSANDS/ΜL (ref 1.85–7.62)
NEUTS SEG NFR BLD AUTO: 73 % (ref 43–75)
NRBC BLD AUTO-RTO: 0 /100 WBCS
PHOSPHATE SERPL-MCNC: 4.5 MG/DL (ref 2.3–4.1)
PLATELET # BLD AUTO: 315 THOUSANDS/UL (ref 149–390)
PMV BLD AUTO: 11 FL (ref 8.9–12.7)
POTASSIUM SERPL-SCNC: 3.5 MMOL/L (ref 3.5–5.3)
POTASSIUM SERPL-SCNC: 3.8 MMOL/L (ref 3.5–5.3)
PROTHROMBIN TIME: 31.8 SECONDS (ref 11.6–14.5)
RBC # BLD AUTO: 2.73 MILLION/UL (ref 3.88–5.62)
RH BLD: POSITIVE
SODIUM SERPL-SCNC: 141 MMOL/L (ref 135–147)
SODIUM SERPL-SCNC: 146 MMOL/L (ref 135–147)
SPECIMEN EXPIRATION DATE: NORMAL
WBC # BLD AUTO: 6.28 THOUSAND/UL (ref 4.31–10.16)

## 2022-08-07 PROCEDURE — 30233N1 TRANSFUSION OF NONAUTOLOGOUS RED BLOOD CELLS INTO PERIPHERAL VEIN, PERCUTANEOUS APPROACH: ICD-10-PCS | Performed by: INTERNAL MEDICINE

## 2022-08-07 PROCEDURE — 93005 ELECTROCARDIOGRAM TRACING: CPT

## 2022-08-07 PROCEDURE — 85610 PROTHROMBIN TIME: CPT | Performed by: INTERNAL MEDICINE

## 2022-08-07 PROCEDURE — 82330 ASSAY OF CALCIUM: CPT | Performed by: NURSE PRACTITIONER

## 2022-08-07 PROCEDURE — 86901 BLOOD TYPING SEROLOGIC RH(D): CPT | Performed by: INTERNAL MEDICINE

## 2022-08-07 PROCEDURE — 86923 COMPATIBILITY TEST ELECTRIC: CPT

## 2022-08-07 PROCEDURE — 85014 HEMATOCRIT: CPT | Performed by: NURSE PRACTITIONER

## 2022-08-07 PROCEDURE — C9113 INJ PANTOPRAZOLE SODIUM, VIA: HCPCS | Performed by: PHYSICIAN ASSISTANT

## 2022-08-07 PROCEDURE — 84100 ASSAY OF PHOSPHORUS: CPT | Performed by: INTERNAL MEDICINE

## 2022-08-07 PROCEDURE — 85018 HEMOGLOBIN: CPT | Performed by: NURSE PRACTITIONER

## 2022-08-07 PROCEDURE — 99232 SBSQ HOSP IP/OBS MODERATE 35: CPT | Performed by: INTERNAL MEDICINE

## 2022-08-07 PROCEDURE — 80048 BASIC METABOLIC PNL TOTAL CA: CPT | Performed by: NURSE PRACTITIONER

## 2022-08-07 PROCEDURE — 85025 COMPLETE CBC W/AUTO DIFF WBC: CPT | Performed by: INTERNAL MEDICINE

## 2022-08-07 PROCEDURE — 86900 BLOOD TYPING SEROLOGIC ABO: CPT | Performed by: INTERNAL MEDICINE

## 2022-08-07 PROCEDURE — 80048 BASIC METABOLIC PNL TOTAL CA: CPT | Performed by: INTERNAL MEDICINE

## 2022-08-07 PROCEDURE — 83735 ASSAY OF MAGNESIUM: CPT | Performed by: NURSE PRACTITIONER

## 2022-08-07 PROCEDURE — 82948 REAGENT STRIP/BLOOD GLUCOSE: CPT

## 2022-08-07 PROCEDURE — 86850 RBC ANTIBODY SCREEN: CPT | Performed by: INTERNAL MEDICINE

## 2022-08-07 PROCEDURE — P9016 RBC LEUKOCYTES REDUCED: HCPCS

## 2022-08-07 RX ORDER — FUROSEMIDE 10 MG/ML
60 INJECTION INTRAMUSCULAR; INTRAVENOUS ONCE
Status: COMPLETED | OUTPATIENT
Start: 2022-08-07 | End: 2022-08-07

## 2022-08-07 RX ORDER — CALCIUM GLUCONATE 20 MG/ML
1 INJECTION, SOLUTION INTRAVENOUS ONCE
Status: COMPLETED | OUTPATIENT
Start: 2022-08-07 | End: 2022-08-07

## 2022-08-07 RX ORDER — POTASSIUM CHLORIDE 20 MEQ/1
40 TABLET, EXTENDED RELEASE ORAL ONCE
Status: COMPLETED | OUTPATIENT
Start: 2022-08-07 | End: 2022-08-07

## 2022-08-07 RX ORDER — WARFARIN SODIUM 2.5 MG/1
2.5 TABLET ORAL
Status: COMPLETED | OUTPATIENT
Start: 2022-08-07 | End: 2022-08-07

## 2022-08-07 RX ORDER — CHOLESTYRAMINE LIGHT 4 G/5.7G
4 POWDER, FOR SUSPENSION ORAL 2 TIMES DAILY
Status: DISCONTINUED | OUTPATIENT
Start: 2022-08-07 | End: 2022-08-10 | Stop reason: HOSPADM

## 2022-08-07 RX ORDER — MAGNESIUM SULFATE HEPTAHYDRATE 40 MG/ML
2 INJECTION, SOLUTION INTRAVENOUS ONCE
Status: COMPLETED | OUTPATIENT
Start: 2022-08-07 | End: 2022-08-07

## 2022-08-07 RX ADMIN — CHOLESTYRAMINE 4 G: 4 POWDER, FOR SUSPENSION ORAL at 17:46

## 2022-08-07 RX ADMIN — CEFTRIAXONE 2000 MG: 2 INJECTION, SOLUTION INTRAVENOUS at 08:00

## 2022-08-07 RX ADMIN — POTASSIUM CHLORIDE 40 MEQ: 1500 TABLET, EXTENDED RELEASE ORAL at 19:27

## 2022-08-07 RX ADMIN — METRONIDAZOLE 500 MG: 500 INJECTION, SOLUTION INTRAVENOUS at 15:40

## 2022-08-07 RX ADMIN — FLUTICASONE PROPIONATE 1 SPRAY: 50 SPRAY, METERED NASAL at 17:46

## 2022-08-07 RX ADMIN — FLUTICASONE FUROATE AND VILANTEROL TRIFENATATE 1 PUFF: 200; 25 POWDER RESPIRATORY (INHALATION) at 07:57

## 2022-08-07 RX ADMIN — LOPERAMIDE HYDROCHLORIDE 2 MG: 2 CAPSULE ORAL at 12:10

## 2022-08-07 RX ADMIN — METRONIDAZOLE 500 MG: 500 INJECTION, SOLUTION INTRAVENOUS at 09:03

## 2022-08-07 RX ADMIN — FUROSEMIDE 60 MG: 10 INJECTION, SOLUTION INTRAMUSCULAR; INTRAVENOUS at 13:51

## 2022-08-07 RX ADMIN — PANTOPRAZOLE SODIUM 40 MG: 40 INJECTION, POWDER, FOR SOLUTION INTRAVENOUS at 09:07

## 2022-08-07 RX ADMIN — INSULIN LISPRO 3 UNITS: 100 INJECTION, SOLUTION INTRAVENOUS; SUBCUTANEOUS at 21:11

## 2022-08-07 RX ADMIN — CHOLESTYRAMINE 4 G: 4 POWDER, FOR SUSPENSION ORAL at 09:15

## 2022-08-07 RX ADMIN — WARFARIN SODIUM 2.5 MG: 2.5 TABLET ORAL at 17:46

## 2022-08-07 RX ADMIN — CALCIUM GLUCONATE 1 G: 20 INJECTION, SOLUTION INTRAVENOUS at 18:23

## 2022-08-07 RX ADMIN — Medication 12.5 MG: at 09:04

## 2022-08-07 RX ADMIN — LOPERAMIDE HYDROCHLORIDE 2 MG: 2 CAPSULE ORAL at 08:17

## 2022-08-07 RX ADMIN — MAGNESIUM SULFATE HEPTAHYDRATE 2 G: 40 INJECTION, SOLUTION INTRAVENOUS at 19:34

## 2022-08-07 RX ADMIN — METRONIDAZOLE 500 MG: 500 INJECTION, SOLUTION INTRAVENOUS at 00:35

## 2022-08-07 RX ADMIN — INSULIN LISPRO 2 UNITS: 100 INJECTION, SOLUTION INTRAVENOUS; SUBCUTANEOUS at 16:26

## 2022-08-07 RX ADMIN — INSULIN LISPRO 2 UNITS: 100 INJECTION, SOLUTION INTRAVENOUS; SUBCUTANEOUS at 11:33

## 2022-08-07 RX ADMIN — FLUTICASONE PROPIONATE 1 SPRAY: 50 SPRAY, METERED NASAL at 09:06

## 2022-08-07 NOTE — ASSESSMENT & PLAN NOTE
Patient has history of anemia in setting of CKD/multiple myeloma  Hemoglobin this morning is 7 2  Iron panel reviewed  GI on board  Stool for occult blood  Received 1 unit of packed red cell transfusion on August 5th  On Protonix  GI follow-up  Trend H&H q 8 hours and transfuse if hemoglobin less than 7

## 2022-08-07 NOTE — PROGRESS NOTES
Progress note - Gastroenterology   Khalif Jay 73 y o  male MRN: [id number]  Unit/Bed#: -01 SDU Encounter: [id number]    ASSESSMENT and PLAN    1  Acute sigmoid diverticulitis    - Continue abx for diverticulitis for total of 10 days (started  8/2-8/11)  - tolerating diet  - having some loose stools, CDiff negative  OK to do questran and imodium prn but would avoid getting constipation given recent diverticulitis  - OK for discharge from GI perspective  Currently getting evaluated by renal  Follow up with oncology as outpatient  Please call with any further questions  2  Acute on chronic kidney disease    3  Multiple myeloma on chemotherapy    4  Afib on Coumadin resumed today  Initially w supratherapeutic INR, now down to 2 9     5  Chronic normocytic anemia  No overt GIB  - trend H&H and transfuse prn, no overt GIB  multiple etiologies to be anemic  Cont to monitor for overt bleeding  6  Last colonoscopy 7/25/22           Chief Complaint   Patient presents with    Vomiting     +N/V/D starting 7/29  Pt had a colonoscopy 7/25, 7/26 had a chemo tx  Elevated kidney levels on OP bloodwork  SUBJECTIVE/HPI   No gi complaints  Tolerating diet  Diarrhea improved  /69   Pulse (!) 51   Temp 98 6 °F (37 °C) (Oral)   Resp (!) 23   Ht 6' (1 829 m)   Wt (!) 136 kg (300 lb 0 7 oz)   SpO2 95%   BMI 40 69 kg/m²     PHYSICALEXAM  General appearance: alert, appears stated age and cooperative  Eyes: Rhonda Rule, no scleral icterus   Head: Normocephalic, without obvious abnormality, atraumatic  Lungs: clear to auscultation bilaterally, no c/w/r  Heart: regular rate and rhythm, S1, S2 normal, no murmur, click, rub or gallop  Abdomen: soft, non-tender, non-distended; bowel sounds normal; no masses,  no organomegaly  Extremities: no clubbing or cyanosis  No LE edema   BKA  Neurologic: Grossly normal, AAOx3, no asterixis    Lab Results   Component Value Date    GLUCOSE 64 (L) 08/01/2022 CALCIUM 8 3 08/07/2022     01/15/2018    K 3 8 08/07/2022    CO2 24 08/07/2022     (H) 08/07/2022    BUN 60 (H) 08/07/2022    CREATININE 3 96 (H) 08/07/2022     Lab Results   Component Value Date    WBC 6 28 08/07/2022    HGB 7 2 (L) 08/07/2022    HCT 23 5 (L) 08/07/2022    MCV 86 08/07/2022     08/07/2022     Lab Results   Component Value Date    ALT 17 08/03/2022    AST 14 08/03/2022     (H) 11/03/2019    ALKPHOS 90 08/03/2022    BILITOT 0 6 01/15/2018     No results found for: AMYLASE  Lab Results   Component Value Date    LIPASE 338 08/02/2022     Lab Results   Component Value Date    IRON 17 (L) 08/04/2022    TIBC 206 (L) 08/04/2022    FERRITIN 165 08/04/2022     Lab Results   Component Value Date    INR 2 90 (H) 08/07/2022

## 2022-08-07 NOTE — ASSESSMENT & PLAN NOTE
Patient with DORA on CKD stage IV in the setting of nausea, vomiting and diarrhea and poor oral intake  Patient admitted with Creatinine elevated at 5 78   Creatinine this morning is 3 96  Most likely due to diarrhea and vomiting  Baseline creatinine in low 2s  Nephrology on board  Patient is off IV fluids  Nephrology ordered diuretics on August 5th  Further diuretics on hold but if patient become short of breath will give Lasix 80 mg x 1  Avoid hypotension/nephrotoxins medication/NSAIDs  Trend BMP

## 2022-08-07 NOTE — ASSESSMENT & PLAN NOTE
Lab Results   Component Value Date    EGFR 14 08/07/2022    EGFR 14 08/06/2022    EGFR 13 08/06/2022    CREATININE 3 96 (H) 08/07/2022    CREATININE 4 07 (H) 08/06/2022    CREATININE 4 27 (H) 08/06/2022   See above

## 2022-08-07 NOTE — ASSESSMENT & PLAN NOTE
Patient is 70-year-old female with an extensive medical history who has nausea, vomiting and diarrhea with decreased p o  Intake over the last 4 days prior to admission  · CT scan - acute sigmoid diverticulitis with intrahepatic and extrahepatic portal venous air is standing for ischemic bowel no pneumatosis  · Surgery and GI on board  · Received 2 L of IV bolus in the ED  · Continue on IV isolyte  · Diet per GI recommendation  · On Rocephin and Flagyl  · Stool for C diff is negative  Stool cultures are negative  · Pain management p r n  · Antiemetics p r n  · As per surgery no surgical intervention needed at this time  · GI on board  · Patient continued to have significant diarrhea  · Will add Questran  Continue Imodium p r n    · Trend WBC and fever curve

## 2022-08-07 NOTE — PROGRESS NOTES
Follow up Consultation    Nephrology   Alex Wong 73 y o  male MRN: [id number]  Unit/Bed#: -01 SDU Encounter: [id number]      Physician Requesting Consult: Laisha Burger MD        ASSESSMENT/PLAN:  16-year-old male with multiple comorbidities including diabetes, CHF, multiple myeloma, AFib, peripheral vascular disease and CKD stage 4 admitted with nausea vomiting and diarrhea  Nephrology following for acute kidney injury  Acute kidney injury (POA) on CKD stage 4:  DORA multifactorial most likely secondary to prerenal azotemia secondary to hypovolemia plus sepsis with subsequent ATN  After review of records In [facility] as well as Care everywhere it appears that the patient has a baseline Creatinine of 1 8-2 2 mg/dL  patient was admitted with a creatinine of 5 78 mg/dL on 08/01/2022  Peak creatinine this hospitalization at 5 78 on 08/01/2022  patient's creatinine today is at 3 96 mg/dL, stable and continues to improve  Hold further IV fluids for now  Will give Lasix 60 mg IV x1 now  Low threshold for re-initiation of IV fluids if patient has persistent or worsening GI losses  check BMP in a m  Await renal recovery  Optimize hemodynamic status to avoid delay in renal recovery  Place on a renal diet when allowed diet order  Avoid nephrotoxins, adjust meds to appropriate GFR  Strict I/O  Daily weights  Urinary retention protocol if patient does not have a [place]  will need to set up patient for follow up with Nephrology as an outpatient post hospitalization  for nephrology as an outpatient patient follows up with Dr white     Blood pressure/hypertension:  current medications:  Metoprolol 12 5 mg p o  B i d   recommendations: Will give Lasix 60 mg IV x1 now  Optimize hemodynamics  Maintain MAP > 65mmHg  Avoid BP fluctuations      H/H/anemia:  most recent hemoglobin at 7 2 grams/deciliter  maintain hemoglobin greater than 8 grams/deciliter  Management primary team  Consider PRBC transfusion    Acid-base electrolytes:    Electrolytes:      Hypokalemia:  Most recent potassium 3 8 mEq  Stable for now    Hyperphosphatemia:  Most recent phosphorus at 4 5  Improving  Continue monitor for now    Acid-base:    Most recent bicarb at 24 stable    Other medical problems:  Diabetes:  Management per primary team   On insulin  CHF:  Most recent echo with EF of 45%  Status post diuretics on 2022 hold further IV fluids for now  Will give Lasix 60 mg IV x1 today  Prior to admission was on torsemide 40 mg p o  B i d   Diverticulitis:  Management per primary team, on Rocephin, Flagyl      Thanks for the consult  Will continue to follow  Please call with questions/ concerns  Above-mentioned orders and Plan in terms of acute kidney injury was discussed with the team in depth via tiger text    Mana Guillaume [facility], 10:52 AM              Objective :   Patient seen and examined in his room no overnight events hemodynamically stable remains afebrile  Urine output close to 1 1 L  Positive diarrhea  Weight as per bed scale decreased by approximately 7 lb in 24 hours  Reports he has had some diarrhea x2 episodes thus far today  Slightly more short of breath as compared to yesterday      PHYSICAL EXAM  /60   Pulse (!) 51   Temp 98 5 °F (36 9 °C) (Oral)   Resp 19   Ht 6' (1 829 m)   Wt (!) 136 kg (300 lb 0 7 oz)   SpO2 92%   BMI 40 69 kg/m²   Temp (24hrs), Av 3 °F (36 8 °C), Min:98 °F (36 7 °C), Max:98 5 °F (36 9 °C)        Intake/Output Summary (Last 24 hours) at 2022 1052  Last data filed at 2022 0547  Gross per 24 hour   Intake 1470 ml   Output 925 ml   Net 545 ml       I/O last 24 hours: In: 8671 [P O :1680; I V :100; IV Piggyback:50]  Out: 1060 [Urine:1060]      Current Weight: Weight - Scale: (!) 136 kg (300 lb 0 7 oz)  First Weight: Weight - Scale: 132 kg (290 lb)  Physical Exam  Vitals and nursing note reviewed     Constitutional:       General: He is not in acute distress  Appearance: Normal appearance  He is obese  He is not ill-appearing, toxic-appearing or diaphoretic  HENT:      Head: Normocephalic and atraumatic  Mouth/Throat:      Mouth: Mucous membranes are moist       Pharynx: Oropharynx is clear  No oropharyngeal exudate  Eyes:      General: No scleral icterus  Conjunctiva/sclera: Conjunctivae normal    Cardiovascular:      Rate and Rhythm: Normal rate  Heart sounds: Normal heart sounds  No friction rub  Pulmonary:      Effort: Pulmonary effort is normal  No respiratory distress  Breath sounds: No stridor  No wheezing  Abdominal:      General: There is no distension  Palpations: Abdomen is soft  There is no mass  Tenderness: There is no abdominal tenderness  Musculoskeletal:         General: Swelling present  Cervical back: Normal range of motion and neck supple  No rigidity or tenderness  Comments: Trace presacral edema bilaterally left AKA   Skin:     General: Skin is warm  Coloration: Skin is not jaundiced  Neurological:      General: No focal deficit present  Mental Status: He is alert and oriented to person, place, and time  Psychiatric:         Mood and Affect: Mood normal          Behavior: Behavior normal              Review of Systems   Constitutional: Negative for chills and fatigue  HENT: Negative for congestion  Respiratory: Positive for shortness of breath  Negative for cough and wheezing  Cardiovascular: Positive for leg swelling  Gastrointestinal: Positive for diarrhea  Negative for abdominal pain and vomiting  Genitourinary: Negative for dysuria  Musculoskeletal: Negative for back pain  Neurological: Negative for headaches  Psychiatric/Behavioral: Negative for agitation and confusion  All other systems reviewed and are negative        Scheduled Meds:  Current Facility-Administered Medications   Medication Dose Route Frequency Provider Last Rate    acetaminophen  650 mg Oral Q6H PRN Magalys Miller MD      cefTRIAXone  2,000 mg Intravenous Q24H Magalys Miller MD 2,000 mg (08/07/22 0800)    cholestyramine sugar free  4 g Oral BID Magalys Miller MD      fluticasone  1 spray Each Nare BID Julia Parr PA-C      fluticasone-vilanterol  1 puff Inhalation Daily Julia Parr PA-C      insulin lispro  1-5 Units Subcutaneous HS Jesica Santana PA-C      insulin lispro  2-12 Units Subcutaneous TID AC NICOLA Morales      loperamide  2 mg Oral Q2H PRN Kimberli FOURNIER PA-C      metoprolol tartrate  12 5 mg Oral Q12H [address] Chao Redman PA-C      metroNIDAZOLE  500 mg Intravenous Q8H Magalys Miller  mg (08/07/22 0903)    ondansetron  4 mg Intravenous Q6H PRN Magalys Miller MD      pantoprazole  40 mg Intravenous Q24H [address] Julia Parr PA-C      warfarin  2 5 mg Oral Once (warfarin) Magalys Miller MD         PRN Meds:   acetaminophen    loperamide    ondansetron    Continuous Infusions:       Invasive Devices:      Invasive Devices  Report    Peripheral Intravenous Line  Duration           Peripheral IV 08/07/22 Proximal;Right;Ventral (anterior) Forearm <1 day          Drain  Duration           Urethral Catheter Non-latex 16 Fr  3 days                  LABORATORY:    Results from last 7 days   Lab Units 08/07/22  0540 08/06/22  1618 08/06/22  0834 08/06/22  0357 08/06/22  0049 08/05/22  1706 08/05/22  0905 08/05/22  0455 08/04/22  1401 08/04/22  0546 08/04/22  0211 08/03/22  1443 08/03/22  0512 08/02/22  0951 08/02/22  0423 08/02/22  0109 08/01/22  2342   WBC Thousand/uL 6 28  --   --  5 94  --   --   --  5 86  --  7 94  --   --  9 29  --  8 92 10 26*  --    HEMOGLOBIN g/dL 7 2* 7 7* 7 7* 7 5* 7 7* 6 9* 6 0* 6 9*   < > 7 1*  7 1*   < > 7 3* 7 3*   < > 7 5* 8 1*  --    I STAT HEMOGLOBIN g/dl  --   --   --   --   --   --   --   --   --   --   --   --   --   --   --   --  7 5*   HEMATOCRIT % 23 5* 24 7* 24 4* 23 5* 24 2* 21 9* 19 1* 22 4*   < > 21 8*  22 1*   < > 22 5* 22 8*   < > 24 4* 26 8*  --    HEMATOCRIT, ISTAT %  --   --   --   --   --   --   --   --   --   --   --   --   --   --   --   --  22*   PLATELETS Thousands/uL 315  --   --  233  --   --   --  209  --  169  --   --  165  --  132* 146*  --    POTASSIUM mmol/L 3 8 3 3*  --  3 3*  --   --   --  3 6  --  3 5  --  3 7 4 1   < > 5 1 5 4*  --    CHLORIDE mmol/L 110* 108  --  108  --   --   --  107  --  109*  --  110* 112*   < > 116* 117*  --    CO2 mmol/L 24 24  --  24  --   --   --  24  --  26  --  23 18*   < > 14* 14*  --    CO2, I-STAT mmol/L  --   --   --   --   --   --   --   --   --   --   --   --   --   --   --   --  11*   BUN mg/dL 60* 63*  --  69*  --   --   --  70*  --  75*  --  82* 82*   < > 89* 92*  --    CREATININE mg/dL 3 96* 4 07*  --  4 27*  --   --   --  4 38*  --  4 72*  --  5 08* 5 02*   < > 5 32* 5 44*  --    CALCIUM mg/dL 8 3 8 3  --  8 1*  --   --   --  8 1*  --  8 2*  --  8 0* 8 1*   < > 8 2* 8 0*  --    MAGNESIUM mg/dL  --  1 9  --  1 9  --   --   --   --   --   --   --   --  1 9  --  1 9 1 7  --    PHOSPHORUS mg/dL 4 5*  --   --  5 4*  --   --   --   --   --  4 8*  --   --   --   --  5 1* 5 5*  --    GLUCOSE, ISTAT mg/dl  --   --   --   --   --   --   --   --   --   --   --   --   --   --   --   --  64*    < > = values in this interval not displayed  rest all reviewed    RADIOLOGY:  XR chest portable   Final Result by Deysi Brooks MD (08/04 1700)      Mild central vascular congestion with probable small pleural effusions in keeping with CHF  Workstation performed: [id number]         XR chest portable   Final Result by Denny Goldberg MD (08/04 1327)      Persistent enlargement of the cardiomediastinal silhouette  No focal consolidation, pleural effusion or pneumothorax  The left basilar opacities evident on the prior CT are obscured by the heart on this portable chest radiograph  Workstation performed: [id number]         CT abdomen pelvis wo contrast   Final Result by Christofer Mtz MD (08/01 2230)      1  Acute sigmoid diverticulitis with intrahepatic and extrahepatic portal venous air, concerning for ischemic bowel  No pneumatosis is identified  2   Left basilar opacities concerning for pneumonia      I personally discussed impression 1 with FRANTZ Alejandre on 8/1/2022 at 10:25 PM       Workstation performed: [id number]           Rest all reviewed    Portions of the record may have been created with voice recognition software  Occasional wrong word or "sound a like" substitutions may have occurred due to the inherent limitations of voice recognition software  Read the chart carefully and recognize, using context, where substitutions have occurred  If you have any questions, please contact the dictating provider

## 2022-08-07 NOTE — PROGRESS NOTES
Pt noted with bradycardic afib with rates in 30s, non-sustained while sleeping  BP stable with pt being easily aroused with sleep  Provider made aware  Metoprolol discontinued  Will cont to monitor

## 2022-08-07 NOTE — PLAN OF CARE
Problem: MOBILITY - ADULT  Goal: Maintain or return to baseline ADL function  Description: INTERVENTIONS:  -  Assess patient's ability to carry out ADLs; assess patient's baseline for ADL function and identify physical deficits which impact ability to perform ADLs (bathing, care of mouth/teeth, toileting, grooming, dressing, etc )  - Assess/evaluate cause of self-care deficits   - Assess range of motion  - Assess patient's mobility; develop plan if impaired  - Assess patient's need for assistive devices and provide as appropriate  - Encourage maximum independence but intervene and supervise when necessary  - Involve family in performance of ADLs  - Assess for home care needs following discharge   - Consider OT consult to assist with ADL evaluation and planning for discharge  - Provide patient education as appropriate  Outcome: Progressing  Goal: Maintains/Returns to pre admission functional level  Description: INTERVENTIONS:  - Perform BMAT or MOVE assessment daily    - Set and communicate daily mobility goal to care team and patient/family/caregiver  - Collaborate with rehabilitation services on mobility goals if consulted  - Perform Range of Motion 4 times a day  - Reposition patient every 2 hours    - Dangle patient 3 times a day  - Stand patient 3 times a day  - Ambulate patient 3 times a day  - Out of bed to chair 3 times a day   - Out of bed for meals 3 times a day  - Out of bed for toileting  - Record patient progress and toleration of activity level   Outcome: Progressing     Problem: Prexisting or High Potential for Compromised Skin Integrity  Goal: Skin integrity is maintained or improved  Description: INTERVENTIONS:  - Identify patients at risk for skin breakdown  - Assess and monitor skin integrity  - Assess and monitor nutrition and hydration status  - Monitor labs   - Assess for incontinence   - Turn and reposition patient  - Assist with mobility/ambulation  - Relieve pressure over bony prominences  - Avoid friction and shearing  - Provide appropriate hygiene as needed including keeping skin clean and dry  - Evaluate need for skin moisturizer/barrier cream  - Collaborate with interdisciplinary team   - Patient/family teaching  - Consider wound care consult   Outcome: Progressing     Problem: PAIN - ADULT  Goal: Verbalizes/displays adequate comfort level or baseline comfort level  Description: Interventions:  - Encourage patient to monitor pain and request assistance  - Assess pain using appropriate pain scale  - Administer analgesics based on type and severity of pain and evaluate response  - Implement non-pharmacological measures as appropriate and evaluate response  - Consider cultural and social influences on pain and pain management  - Notify physician/advanced practitioner if interventions unsuccessful or patient reports new pain  Outcome: Progressing     Problem: INFECTION - ADULT  Goal: Absence or prevention of progression during hospitalization  Description: INTERVENTIONS:  - Assess and monitor for signs and symptoms of infection  - Monitor lab/diagnostic results  - Monitor all insertion sites, i e  indwelling lines, tubes, and drains  - Monitor endotracheal if appropriate and nasal secretions for changes in amount and color  - [place] appropriate cooling/warming therapies per order  - Administer medications as ordered  - Instruct and encourage patient and family to use good hand hygiene technique  - Identify and instruct in appropriate isolation precautions for identified infection/condition  Outcome: Progressing  Goal: Absence of fever/infection during neutropenic period  Description: INTERVENTIONS:  - Monitor WBC    Outcome: Progressing     Problem: SAFETY ADULT  Goal: Maintain or return to baseline ADL function  Description: INTERVENTIONS:  -  Assess patient's ability to carry out ADLs; assess patient's baseline for ADL function and identify physical deficits which impact ability to perform ADLs (bathing, care of mouth/teeth, toileting, grooming, dressing, etc )  - Assess/evaluate cause of self-care deficits   - Assess range of motion  - Assess patient's mobility; develop plan if impaired  - Assess patient's need for assistive devices and provide as appropriate  - Encourage maximum independence but intervene and supervise when necessary  - Involve family in performance of ADLs  - Assess for home care needs following discharge   - Consider OT consult to assist with ADL evaluation and planning for discharge  - Provide patient education as appropriate  Outcome: Progressing  Goal: Maintains/Returns to pre admission functional level  Description: INTERVENTIONS:  - Perform BMAT or MOVE assessment daily    - Set and communicate daily mobility goal to care team and patient/family/caregiver  - Collaborate with rehabilitation services on mobility goals if consulted  - Perform Range of Motion 4 times a day  - Reposition patient every 2 hours    - Dangle patient 3 times a day  - Stand patient 3 times a day  - Ambulate patient 3 times a day  - Out of bed to chair 3 times a day   - Out of bed for meals 3 times a day  - Out of bed for toileting  - Record patient progress and toleration of activity level   Outcome: Progressing  Goal: Patient will remain free of falls  Description: INTERVENTIONS:  - Educate patient/family on patient safety including physical limitations  - Instruct patient to call for assistance with activity   - Consult OT/PT to assist with strengthening/mobility   - Keep Call bell within reach  - Keep bed low and locked with side rails adjusted as appropriate  - Keep care items and personal belongings within reach  - Initiate and maintain comfort rounds  - Make Fall Risk Sign visible to staff  - Offer Toileting every 2 Hours, in advance of need  - Initiate/Maintain bed alarm  - Obtain necessary fall risk management equipment  - Apply yellow socks and bracelet for high fall risk patients  - Consider moving patient to room near nurses station  Outcome: Progressing     Problem: DISCHARGE PLANNING  Goal: Discharge to home or other facility with appropriate resources  Description: INTERVENTIONS:  - Identify barriers to discharge w/patient and caregiver  - Arrange for needed discharge resources and transportation as appropriate  - Identify discharge learning needs (meds, wound care, etc )  - Arrange for interpretive services to assist at discharge as needed  - Refer to Case Management Department for coordinating discharge planning if the patient needs post-hospital services based on physician/advanced practitioner order or complex needs related to functional status, cognitive ability, or social support system  Outcome: Progressing     Problem: Knowledge Deficit  Goal: Patient/family/caregiver demonstrates understanding of disease process, treatment plan, medications, and discharge instructions  Description: Complete learning assessment and assess knowledge base    Interventions:  - Provide teaching at level of understanding  - Provide teaching via preferred learning methods  Outcome: Progressing     Problem: Potential for Falls  Goal: Patient will remain free of falls  Description: INTERVENTIONS:  - Educate patient/family on patient safety including physical limitations  - Instruct patient to call for assistance with activity   - Consult OT/PT to assist with strengthening/mobility   - Keep Call bell within reach  - Keep bed low and locked with side rails adjusted as appropriate  - Keep care items and personal belongings within reach  - Initiate and maintain comfort rounds  - Make Fall Risk Sign visible to staff  - Offer Toileting every 2 Hours, in advance of need  - Initiate/Maintain bed alarm  - Obtain necessary fall risk management equipment  - Apply yellow socks and bracelet for high fall risk patients  - Consider moving patient to room near nurses station  Outcome: Progressing     Problem: Nutrition/Hydration-ADULT  Goal: Nutrient/Hydration intake appropriate for improving, restoring or maintaining nutritional needs  Description: Monitor and assess patient's nutrition/hydration status for malnutrition  Collaborate with interdisciplinary team and initiate plan and interventions as ordered  Monitor patient's weight and dietary intake as ordered or per policy  Utilize nutrition screening tool and intervene as necessary  Determine patient's food preferences and provide high-protein, high-caloric foods as appropriate       INTERVENTIONS:  - Monitor oral intake, urinary output, labs, and treatment plans  - Assess nutrition and hydration status and recommend course of action  - Evaluate amount of meals eaten  - Assist patient with eating if necessary   - Allow adequate time for meals  - Recommend/ encourage appropriate diets, oral nutritional supplements, and vitamin/mineral supplements  - Order, calculate, and assess calorie counts as needed  - Recommend, monitor, and adjust tube feedings and TPN/PPN based on assessed needs  - Assess need for intravenous fluids  - Provide specific nutrition/hydration education as appropriate  - Include patient/family/caregiver in decisions related to nutrition  Outcome: Progressing

## 2022-08-07 NOTE — PROGRESS NOTES
[place]  Progress Note - Cathy  1950, 73 y o  male MRN: [id number]  Unit/Bed#: -01 SDU Encounter: [id number]  Primary Care Provider: Consuelo Gray MD   Date and time admitted to hospital: 8/1/2022  8:27 PM    Hyperkalemia  Assessment & Plan  Admitted with potassium of 5 5  Resolved    Stage 3b chronic kidney disease [facility])  Assessment & Plan  Lab Results   Component Value Date    EGFR 14 08/07/2022    EGFR 14 08/06/2022    EGFR 13 08/06/2022    CREATININE 3 96 (H) 08/07/2022    CREATININE 4 07 (H) 08/06/2022    CREATININE 4 27 (H) 08/06/2022   See above    Anemia  Assessment & Plan  Patient has history of anemia in setting of CKD/multiple myeloma  Hemoglobin this morning is 7 2  Iron panel reviewed  GI on board  Stool for occult blood  Received 1 unit of packed red cell transfusion on August 5th  On Protonix  GI follow-up  Trend H&H q 8 hours and transfuse if hemoglobin less than 7      Ambulatory dysfunction  Assessment & Plan  P T /OT consult  Has history of left AKA    Chronic obstructive pulmonary disease, unspecified ([address] )  Assessment & Plan  Continue Flonase and use Breo instead of Advair   Stable at this time    Multiple myeloma [facility])  Assessment & Plan  Last chemo infusion was on July 26  Continue outpatient follow-up    DORA (acute kidney injury) [facility])  Assessment & Plan  Patient with DORA on CKD stage IV in the setting of nausea, vomiting and diarrhea and poor oral intake  Patient admitted with Creatinine elevated at 5 78   Creatinine this morning is 3 96  Most likely due to diarrhea and vomiting  Baseline creatinine in low 2s  Nephrology on board  Patient is off IV fluids  Nephrology ordered diuretics on August 5th  Further diuretics on hold but if patient become short of breath will give Lasix 80 mg x 1  Avoid hypotension/nephrotoxins medication/NSAIDs  Trend BMP    NALLELY (obstructive sleep apnea)  Assessment & Plan  History of obstructive sleep apnea with noncompliance with CPAP    GERD (gastroesophageal reflux disease)  Assessment & Plan  On Protonix    Morbid obesity ([facility] [address] )  Assessment & Plan  Encourage weight loss and lifestyle modification    Chronic atrial fibrillation [facility])  Assessment & Plan  Patient is on beta-blocker and Coumadin at home  On Lopressor  INR is 2 90  Will order Coumadin 2 5 mg tonight  Monitor PT INR    * Acute diverticulitis  Assessment & Plan  Patient is 71-year-old female with an extensive medical history who has nausea, vomiting and diarrhea with decreased p o  Intake over the last 4 days prior to admission  · CT scan - acute sigmoid diverticulitis with intrahepatic and extrahepatic portal venous air is standing for ischemic bowel no pneumatosis  · Surgery and GI on board  · Received 2 L of IV bolus in the ED  · Continue on IV isolyte  · Diet per GI recommendation  · On Rocephin and Flagyl  · Stool for C diff is negative  Stool cultures are negative  · Pain management p r n  · Antiemetics p r n  · As per surgery no surgical intervention needed at this time  · GI on board  · Patient continued to have significant diarrhea  · Will add Questran  Continue Imodium p r n  · Trend WBC and fever curve        Labs & Imaging: I have personally reviewed pertinent reports  VTE Prophylaxis: in place  Code Status:   Level 1 - Full Code    Patient Centered Rounds: I have performed bedside rounds with nursing staff today  Discussions with Specialists or Other Care Team Provider:  GI    Education and Discussions with Family / Patient:  Attempted to call yokasta Sotelo    Current Length of Stay: 6 day(s)    Current Patient Status: Inpatient   Certification Statement: The patient will continue to require additional inpatient hospital stay due to see my assessment and plan  Subjective:   Patient is seen and examined at bedside  Complains of significant diarrhea  Also has left lower quadrant abdominal pain  Denies any nausea, vomiting  Afebrile  All other ROS are negative  Objective:    Vitals: Blood pressure 128/60, pulse 66, temperature 98 5 °F (36 9 °C), temperature source Oral, resp  rate 21, height 6' (1 829 m), weight (!) 136 kg (300 lb 0 7 oz), SpO2 95 %  ,Body mass index is 40 69 kg/m²  SPO2 RA Rest    Flowsheet Row ED to Hosp-Admission (Current) from 8/1/2022 in [address] Intensive Care Unit   SpO2 95 %   SpO2 Activity At Rest   O2 Device None (Room air)   O2 Flow Rate --        I&O:     Intake/Output Summary (Last 24 hours) at 8/7/2022 0920  Last data filed at 8/7/2022 0547  Gross per 24 hour   Intake 1470 ml   Output 925 ml   Net 545 ml       Physical Exam:    General- Alert, lying comfortably in bed  Not in any acute distress  Neck- Supple, No JVD  CVS- regular, S1 and S2 normal  Chest- Bilateral Air entry, No rhochi, crackles or wheezing present  Abdomen- soft, LLQ tender, not distended, no guarding or rigidity, BS+  Extremities-  No pedal edema, No calf tenderness  Left AKA  CNS-   Alert, awake and orientedx3  No focal deficits present      Invasive Devices  Report    Peripheral Intravenous Line  Duration           Peripheral IV 08/07/22 Proximal;Right;Ventral (anterior) Forearm <1 day          Drain  Duration           Urethral Catheter Non-latex 16 Fr  3 days                      Social History  reviewed  Family History   Problem Relation Age of Onset    Other Mother         CARDIAC DISORDER     Diabetes Mother     Cancer Father     Other Family         CARDIAC DISORDER     Diabetes Family     Cancer Family     Mental illness Family     Kidney disease Sister     Diabetes Sister     reviewed    Meds:  Current Facility-Administered Medications   Medication Dose Route Frequency Provider Last Rate Last Admin    acetaminophen (TYLENOL) tablet 650 mg  650 mg Oral Q6H PRN Dmitry Avendaño MD   650 mg at 08/06/22 1172    cefTRIAXone (ROCEPHIN) IVPB (premix in dextrose) 2,000 mg 50 mL  2,000 mg Intravenous Q24H Eveline Christianson  mL/hr at 08/07/22 0800 2,000 mg at 08/07/22 0800    cholestyramine sugar free (QUESTRAN LIGHT) packet 4 g  4 g Oral BID Eveline Christianson MD   4 g at 08/07/22 0915    fluticasone (FLONASE) 50 mcg/act nasal spray 1 spray  1 spray Each Nare BID Julia Parr PA-C   1 spray at 08/07/22 0906    fluticasone-vilanterol (BREO ELLIPTA) 200-25 MCG/INH inhaler 1 puff  1 puff Inhalation Daily Julia Parr PA-C   1 puff at 08/07/22 0757    insulin lispro (HumaLOG) 100 units/mL subcutaneous injection 1-5 Units  1-5 Units Subcutaneous HS Shaista Mckay PA-C   2 Units at 08/06/22 2111    insulin lispro (HumaLOG) 100 units/mL subcutaneous injection 2-12 Units  2-12 Units Subcutaneous TID AC NICOLA Morales   2 Units at 08/06/22 1535    loperamide (IMODIUM) capsule 2 mg  2 mg Oral Q2H PRN Jose Alberto FOURNIER PA-C   2 mg at 08/07/22 0817    metoprolol tartrate (LOPRESSOR) partial tablet 12 5 mg  12 5 mg Oral Q12H [facility] Vicky Stauffer PA-C   12 5 mg at 08/07/22 0592    metroNIDAZOLE (FLAGYL) IVPB (premix) 500 mg 100 mL  500 mg Intravenous Q8H Eveline Christianson  mL/hr at 08/07/22 0903 500 mg at 08/07/22 0903    ondansetron (ZOFRAN) injection 4 mg  4 mg Intravenous Q6H PRN Eveline Christianson MD        pantoprazole (PROTONIX) injection 40 mg  40 mg Intravenous Q24H [facility] Julia Parr PA-C   40 mg at 08/07/22 0907      Medications Prior to Admission   Medication    acetaminophen (TYLENOL) 500 mg tablet    albuterol (PROVENTIL HFA,VENTOLIN HFA) 90 mcg/act inhaler    Alcohol Swabs (ALCOHOL PREP) 70 % PADS    allopurinol (ZYLOPRIM) 300 mg tablet    ammonium lactate (LAC-HYDRIN) 12 % lotion    atorvastatin (LIPITOR) 40 mg tablet    BD AUTOSHIELD DUO 30G X 5 MM MISC    BD INSULIN SYRINGE U/F 31G X 5/16" 0 5 ML MISC    BD PEN NEEDLE HILARIO U/F 32G X 4 MM MISC    bisacodyl (DULCOLAX) 10 mg suppository    bisacodyl (DULCOLAX) 5 mg EC tablet    bisacodyl (DULCOLAX) 5 mg EC tablet    bortezomib (VELCADE)    clotrimazole (LOTRIMIN) 1 % cream    colchicine (COLCRYS) 0 6 mg tablet    docusate sodium (COLACE) 100 mg capsule    Easy Touch Safety Lancets 28G MISC    fluticasone (FLONASE) 50 mcg/act nasal spray    fluticasone-salmeterol (ADVAIR DISKUS, WIXELA INHUB) 250-50 mcg/dose inhaler    insulin glargine (Lantus SoloStar) 100 units/mL injection pen    liraglutide (Victoza) injection    loperamide (IMODIUM) 2 mg capsule    magnesium hydroxide (MILK OF MAGNESIA) 400 mg/5 mL oral suspension    Melatonin 5 MG TABS    metFORMIN (GLUCOPHAGE) 1000 MG tablet    metoprolol succinate (TOPROL-XL) 25 mg 24 hr tablet    multivitamin-minerals therapeutic (THERA-M)    NOVOLOG FLEXPEN 100 units/mL SOPN    nystatin (MYCOSTATIN) powder    omeprazole (PriLOSEC) 40 MG capsule    ondansetron (ZOFRAN) 4 mg tablet    polyethylene glycol (GOLYTELY) 4000 mL solution    polyethylene glycol (GOLYTELY) 4000 mL solution    polyethylene glycol (MIRALAX) 17 g packet    potassium chloride (K-DUR,KLOR-CON) 20 mEq tablet    torsemide (DEMADEX) 20 mg tablet    warfarin (COUMADIN) 1 mg tablet    warfarin (COUMADIN) 6 mg tablet       Labs:  Results from last 7 days   Lab Units 08/07/22  0540 08/06/22  1618 08/06/22  0834 08/06/22  0357 08/05/22  0905 08/05/22  0455   WBC Thousand/uL 6 28  --   --  5 94  --  5 86   HEMOGLOBIN g/dL 7 2* 7 7* 7 7* 7 5*   < > 6 9*   HEMATOCRIT % 23 5* 24 7* 24 4* 23 5*   < > 22 4*   PLATELETS Thousands/uL 315  --   --  233  --  209   NEUTROS PCT % 73  --   --  76*  --  71   LYMPHS PCT % 14  --   --  12*  --  16   MONOS PCT % 7  --   --  7  --  9   EOS PCT % 4  --   --  3  --  3    < > = values in this interval not displayed       Results from last 7 days   Lab Units 08/07/22  0540 08/06/22  1618 08/06/22  0357 08/03/22  1443 08/03/22  0512 08/02/22  1622 08/02/22  0951 08/02/22  0423 08/02/22  0109 08/01/22  2342   POTASSIUM mmol/L 3 8 3 3* 3 3*   < > 4 1   < > 4 8 5 1   < >  --    CHLORIDE mmol/L 110* 108 108   < > 112*   < > 116* 116*   < >  --    CO2 mmol/L 24 24 24   < > 18*   < > 14* 14*   < >  --    CO2, I-STAT mmol/L  --   --   --   --   --   --   --   --   --  11*   BUN mg/dL 60* 63* 69*   < > 82*   < > 91* 89*   < >  --    CREATININE mg/dL 3 96* 4 07* 4 27*   < > 5 02*   < > 5 38* 5 32*   < >  --    CALCIUM mg/dL 8 3 8 3 8 1*   < > 8 1*   < > 8 2* 8 2*   < >  --    ALK PHOS U/L  --   --   --   --  90  --  100 101   < >  --    ALT U/L  --   --   --   --  17  --  21 21   < >  --    AST U/L  --   --   --   --  14  --  18 14   < >  --    GLUCOSE, ISTAT mg/dl  --   --   --   --   --   --   --   --   --  64*    < > = values in this interval not displayed  Lab Results   Component Value Date    TROPONINI 5 19 (H) 06/27/2020    TROPONINI 5 85 (H) 06/27/2020    TROPONINI 6 35 (H) 06/27/2020    CKMB 4 1 06/25/2020    CKTOTAL 271 06/25/2020    CKTOTAL 53 03/23/2018    CKTOTAL 55 10/08/2017     Results from last 7 days   Lab Units 08/07/22  0540 08/05/22  0455 08/04/22  0546   INR  2 90* 5 06* 4 64*     Lab Results   Component Value Date    BLOODCX No Growth After 5 Days  10/04/2021    BLOODCX No Growth After 5 Days  10/04/2021    BLOODCX No Growth After 5 Days  06/25/2020    BLOODCX No Growth After 5 Days  06/25/2020    URINECX >100,000 cfu/ml Escherichia coli (A) 12/05/2019    WOUNDCULT 4+ Growth of Proteus mirabilis (A) 07/25/2019    WOUNDCULT 4+ Growth of  07/25/2019    WOUNDCULT (A) 07/25/2019     4+ Growth of Methicillin Resistant Staphylococcus aureus    WOUNDCULT 1+ Growth of Proteus mirabilis (A) 07/25/2019    WOUNDCULT 4+ Growth of  07/25/2019    SPUTUMCULTUR 4+ Growth of  07/11/2020    SPUTUMCULTUR 1+ Growth of Aspergillus fumigatus (A) 07/11/2020         Imaging:  Results for orders placed during the hospital encounter of 08/01/22    XR chest portable    Narrative  CHEST    INDICATION:   Shortness of breath rule out CHF      COMPARISON:  8/2/2022    EXAM PERFORMED/VIEWS:  XR CHEST PORTABLE      FINDINGS:    Unchanged cardiomegaly  Vascular congestion and bilateral pleural effusions likely on the basis of CHF  Osseous structures appear within normal limits for patient age  Impression  Mild central vascular congestion with probable small pleural effusions in keeping with CHF  Workstation performed: [id number]    Results for orders placed during the hospital encounter of 10/04/21    XR chest pa & lateral    Narrative  CHEST    INDICATION:   follow up lung consolidations  COMPARISON:  CTA chest/abdomen/pelvis 10/4/2021  Chest radiograph 7/8/2020  EXAM PERFORMED/VIEWS:  XR CHEST PA & LATERAL      FINDINGS:    Heart shadow is enlarged but unchanged from prior exam     Again seen is dense left lower lobe/retrocardiac consolidation with ill-defined airspace opacities in the lingula and right lower lobe, similar to recent chest CT  Trace left pleural effusion  No pneumothorax  Osseous structures appear within normal limits for patient age  Impression  Stable left lower lobe/retrocardiac consolidation and lingular and right lower lobe ill-defined airspace opacities  Trace left pleural effusion          Workstation performed: [id number]      Last 24 Hours Medication List:   Current Facility-Administered Medications   Medication Dose Route Frequency Provider Last Rate    acetaminophen  650 mg Oral Q6H PRN Darline Cox MD      cefTRIAXone  2,000 mg Intravenous Q24H Darline Cox MD 2,000 mg (08/07/22 0800)    cholestyramine sugar free  4 g Oral BID Darline Cox MD      fluticasone  1 spray Each Nare BID Julia Parr PA-C      fluticasone-vilanterol  1 puff Inhalation Daily Julia Parr PA-C      insulin lispro  1-5 Units Subcutaneous HS Jesica Santana PA-C      insulin lispro  2-12 Units Subcutaneous TID AC NICOLA Morales      loperamide  2 mg Oral Q2H PRN Perez FOURNIER PA-C      metoprolol tartrate  12 5 mg Oral Q12H [facility]      metroNIDAZOLE  500 mg Intravenous Q8H Amy Gamble  mg (08/07/22 0903)    ondansetron  4 mg Intravenous Q6H PRN Amy Gamble MD      pantoprazole  40 mg Intravenous Q24H [address] Julia Parr PA-C          Today, Patient Was Seen By: Amy Gamble MD    ** Please Note: Dictation voice to text software may have been used in the creation of this document   **

## 2022-08-07 NOTE — ASSESSMENT & PLAN NOTE
Patient is on beta-blocker and Coumadin at home  On Lopressor  INR is 2 90  Will order Coumadin 2 5 mg tonight  Monitor PT INR

## 2022-08-08 LAB
ABO GROUP BLD BPU: NORMAL
ANION GAP SERPL CALCULATED.3IONS-SCNC: 14 MMOL/L (ref 4–13)
ATRIAL RATE: 49 BPM
ATRIAL RATE: 63 BPM
ATRIAL RATE: 68 BPM
BASOPHILS # BLD AUTO: 0.08 THOUSANDS/ΜL (ref 0–0.1)
BASOPHILS NFR BLD AUTO: 1 % (ref 0–1)
BPU ID: NORMAL
BUN SERPL-MCNC: 53 MG/DL (ref 5–25)
CALCIUM SERPL-MCNC: 8.3 MG/DL (ref 8.3–10.1)
CHLORIDE SERPL-SCNC: 110 MMOL/L (ref 96–108)
CO2 SERPL-SCNC: 19 MMOL/L (ref 21–32)
CREAT SERPL-MCNC: 3.4 MG/DL (ref 0.6–1.3)
CROSSMATCH: NORMAL
EOSINOPHIL # BLD AUTO: 0.29 THOUSAND/ΜL (ref 0–0.61)
EOSINOPHIL NFR BLD AUTO: 4 % (ref 0–6)
ERYTHROCYTE [DISTWIDTH] IN BLOOD BY AUTOMATED COUNT: 19.6 % (ref 11.6–15.1)
GFR SERPL CREATININE-BSD FRML MDRD: 17 ML/MIN/1.73SQ M
GLUCOSE SERPL-MCNC: 106 MG/DL (ref 65–140)
GLUCOSE SERPL-MCNC: 115 MG/DL (ref 65–140)
GLUCOSE SERPL-MCNC: 171 MG/DL (ref 65–140)
GLUCOSE SERPL-MCNC: 208 MG/DL (ref 65–140)
GLUCOSE SERPL-MCNC: 368 MG/DL (ref 65–140)
HCT VFR BLD AUTO: 29.5 % (ref 36.5–49.3)
HEMOCCULT STL QL: NEGATIVE
HGB BLD-MCNC: 8.9 G/DL (ref 12–17)
IMM GRANULOCYTES # BLD AUTO: 0.1 THOUSAND/UL (ref 0–0.2)
IMM GRANULOCYTES NFR BLD AUTO: 2 % (ref 0–2)
INR PPP: 3.16 (ref 0.84–1.19)
LYMPHOCYTES # BLD AUTO: 0.8 THOUSANDS/ΜL (ref 0.6–4.47)
LYMPHOCYTES NFR BLD AUTO: 12 % (ref 14–44)
MCH RBC QN AUTO: 26.8 PG (ref 26.8–34.3)
MCHC RBC AUTO-ENTMCNC: 30.2 G/DL (ref 31.4–37.4)
MCV RBC AUTO: 89 FL (ref 82–98)
MONOCYTES # BLD AUTO: 0.54 THOUSAND/ΜL (ref 0.17–1.22)
MONOCYTES NFR BLD AUTO: 8 % (ref 4–12)
NEUTROPHILS # BLD AUTO: 4.78 THOUSANDS/ΜL (ref 1.85–7.62)
NEUTS SEG NFR BLD AUTO: 73 % (ref 43–75)
NRBC BLD AUTO-RTO: 0 /100 WBCS
PLATELET # BLD AUTO: 313 THOUSANDS/UL (ref 149–390)
PMV BLD AUTO: 10.4 FL (ref 8.9–12.7)
POTASSIUM SERPL-SCNC: 3.8 MMOL/L (ref 3.5–5.3)
PROTHROMBIN TIME: 33.9 SECONDS (ref 11.6–14.5)
QRS AXIS: 114 DEGREES
QRS AXIS: 116 DEGREES
QRS AXIS: 121 DEGREES
QRSD INTERVAL: 124 MS
QRSD INTERVAL: 134 MS
QRSD INTERVAL: 136 MS
QT INTERVAL: 392 MS
QT INTERVAL: 432 MS
QT INTERVAL: 476 MS
QTC INTERVAL: 398 MS
QTC INTERVAL: 404 MS
QTC INTERVAL: 411 MS
RBC # BLD AUTO: 3.32 MILLION/UL (ref 3.88–5.62)
SODIUM SERPL-SCNC: 143 MMOL/L (ref 135–147)
T WAVE AXIS: -76 DEGREES
T WAVE AXIS: 173 DEGREES
T WAVE AXIS: 9 DEGREES
UNIT DISPENSE STATUS: NORMAL
UNIT PRODUCT CODE: NORMAL
UNIT PRODUCT VOLUME: 350 ML
UNIT RH: NORMAL
VENTRICULAR RATE: 45 BPM
VENTRICULAR RATE: 51 BPM
VENTRICULAR RATE: 64 BPM
WBC # BLD AUTO: 6.59 THOUSAND/UL (ref 4.31–10.16)

## 2022-08-08 PROCEDURE — 80048 BASIC METABOLIC PNL TOTAL CA: CPT | Performed by: INTERNAL MEDICINE

## 2022-08-08 PROCEDURE — 85610 PROTHROMBIN TIME: CPT | Performed by: INTERNAL MEDICINE

## 2022-08-08 PROCEDURE — C9113 INJ PANTOPRAZOLE SODIUM, VIA: HCPCS | Performed by: PHYSICIAN ASSISTANT

## 2022-08-08 PROCEDURE — 93010 ELECTROCARDIOGRAM REPORT: CPT | Performed by: INTERNAL MEDICINE

## 2022-08-08 PROCEDURE — 99232 SBSQ HOSP IP/OBS MODERATE 35: CPT | Performed by: INTERNAL MEDICINE

## 2022-08-08 PROCEDURE — 99223 1ST HOSP IP/OBS HIGH 75: CPT | Performed by: INTERNAL MEDICINE

## 2022-08-08 PROCEDURE — 93005 ELECTROCARDIOGRAM TRACING: CPT

## 2022-08-08 PROCEDURE — 99233 SBSQ HOSP IP/OBS HIGH 50: CPT | Performed by: INTERNAL MEDICINE

## 2022-08-08 PROCEDURE — 82948 REAGENT STRIP/BLOOD GLUCOSE: CPT

## 2022-08-08 PROCEDURE — 85025 COMPLETE CBC W/AUTO DIFF WBC: CPT | Performed by: INTERNAL MEDICINE

## 2022-08-08 RX ORDER — TORSEMIDE 20 MG/1
40 TABLET ORAL DAILY
Status: DISCONTINUED | OUTPATIENT
Start: 2022-08-08 | End: 2022-08-10 | Stop reason: HOSPADM

## 2022-08-08 RX ORDER — ATROPINE SULFATE 1 MG/ML
0.4 INJECTION, SOLUTION INTRAVENOUS ONCE
Status: DISCONTINUED | OUTPATIENT
Start: 2022-08-08 | End: 2022-08-10 | Stop reason: HOSPADM

## 2022-08-08 RX ORDER — ATROPINE SULFATE 0.1 MG/ML
INJECTION INTRAVENOUS
Status: COMPLETED
Start: 2022-08-08 | End: 2022-08-08

## 2022-08-08 RX ADMIN — ATROPINE SULFATE 0.4 MG: 0.1 INJECTION INTRAVENOUS at 03:00

## 2022-08-08 RX ADMIN — FLUTICASONE PROPIONATE 1 SPRAY: 50 SPRAY, METERED NASAL at 17:43

## 2022-08-08 RX ADMIN — PANTOPRAZOLE SODIUM 40 MG: 40 INJECTION, POWDER, FOR SOLUTION INTRAVENOUS at 10:22

## 2022-08-08 RX ADMIN — LOPERAMIDE HYDROCHLORIDE 2 MG: 2 CAPSULE ORAL at 18:17

## 2022-08-08 RX ADMIN — INSULIN LISPRO 4 UNITS: 100 INJECTION, SOLUTION INTRAVENOUS; SUBCUTANEOUS at 11:16

## 2022-08-08 RX ADMIN — METRONIDAZOLE 500 MG: 500 INJECTION, SOLUTION INTRAVENOUS at 02:07

## 2022-08-08 RX ADMIN — INSULIN LISPRO 10 UNITS: 100 INJECTION, SOLUTION INTRAVENOUS; SUBCUTANEOUS at 16:25

## 2022-08-08 RX ADMIN — CHOLESTYRAMINE 4 G: 4 POWDER, FOR SUSPENSION ORAL at 10:22

## 2022-08-08 RX ADMIN — FLUTICASONE FUROATE AND VILANTEROL TRIFENATATE 1 PUFF: 200; 25 POWDER RESPIRATORY (INHALATION) at 07:43

## 2022-08-08 RX ADMIN — TORSEMIDE 40 MG: 20 TABLET ORAL at 10:15

## 2022-08-08 RX ADMIN — METRONIDAZOLE 500 MG: 500 INJECTION, SOLUTION INTRAVENOUS at 16:03

## 2022-08-08 RX ADMIN — METRONIDAZOLE 500 MG: 500 INJECTION, SOLUTION INTRAVENOUS at 23:49

## 2022-08-08 RX ADMIN — FLUTICASONE PROPIONATE 1 SPRAY: 50 SPRAY, METERED NASAL at 10:17

## 2022-08-08 RX ADMIN — METRONIDAZOLE 500 MG: 500 INJECTION, SOLUTION INTRAVENOUS at 09:28

## 2022-08-08 RX ADMIN — CEFTRIAXONE 2000 MG: 2 INJECTION, SOLUTION INTRAVENOUS at 07:32

## 2022-08-08 RX ADMIN — INSULIN LISPRO 2 UNITS: 100 INJECTION, SOLUTION INTRAVENOUS; SUBCUTANEOUS at 07:37

## 2022-08-08 NOTE — PROGRESS NOTES
Progress Note - Nephrology   Loan Brand 73 y o  male MRN: [id number]  Unit/Bed#: -01 SDU Encounter: [id number]      Assessment / Plan:  1  DORA, present on admission, on top of CKD stage 4 likely d/t prerenal azotemia from hypovolemia +/-septic ATN  -baseline serum creatinine 1 8-2 2, admit serum creatinine 5 78, improving daily  -monitor BMP on torsemide 40 mg daily, would hold this if diarrhea 3 presents for volume depletion of concern  -follows with me, Dr Kenneth Stack, as an outpatient    2  HTN - BP acceptable, monitor on torsemide    3  Anemia - Hgb below goal, monitor CBC, transfuse prn    Other issues: DM2, CHF, diverticulitis-on abx        Subjective:   He complains of shortness of breath but denies nausea or vomiting  He did have diarrhea twice today  He has had some abdominal pain  He is on 2 L oxygen nasal cannula  No other complaints  Objective:     Vitals: Blood pressure 136/99, pulse 55, temperature 98 1 °F (36 7 °C), temperature source Oral, resp  rate 18, height 6' (1 829 m), weight (!) 140 kg (309 lb 8 4 oz), SpO2 97 %  ,Body mass index is 41 98 kg/m²  Temp (24hrs), Av 1 °F (36 7 °C), Min:97 4 °F (36 3 °C), Max:98 4 °F (36 9 °C)      Weight (last 2 days)     Date/Time Weight    22 0600 140 (309 53)    22 0600 136 (300 05)    22 0556 139 (307 1)            Intake/Output Summary (Last 24 hours) at 2022 1618  Last data filed at 2022 1246  Gross per 24 hour   Intake 1682 5 ml   Output 1590 ml   Net 92 5 ml     I/O last 24 hours: In: 2702 5 [P O :1980; I V :100; Blood:372 5; IV Piggyback:250]  Out: 1900 [Urine:1800; Stool:100]        Physical Exam:   Physical Exam  Vitals reviewed  Constitutional:       General: He is not in acute distress  Appearance: He is well-developed  He is obese  He is not diaphoretic  HENT:      Head: Normocephalic and atraumatic        Nose: Nose normal       Mouth/Throat:      Mouth: Mucous membranes are moist  Pharynx: No oropharyngeal exudate  Eyes:      General: No scleral icterus  Right eye: No discharge  Left eye: No discharge  Neck:      Thyroid: No thyromegaly  Cardiovascular:      Rate and Rhythm: Normal rate and regular rhythm  Heart sounds: Normal heart sounds  Pulmonary:      Effort: Pulmonary effort is normal       Breath sounds: Normal breath sounds  No wheezing or rales  Abdominal:      General: Bowel sounds are normal  There is no distension  Palpations: Abdomen is soft  Tenderness: There is no abdominal tenderness  Musculoskeletal:         General: No swelling  Cervical back: Neck supple  Comments: S/p L AKA   Lymphadenopathy:      Cervical: No cervical adenopathy  Skin:     General: Skin is warm and dry  Findings: No rash  Neurological:      General: No focal deficit present  Mental Status: He is alert        Comments: awake   Psychiatric:         Mood and Affect: Mood normal          Behavior: Behavior normal          Invasive Devices  Report    Peripheral Intravenous Line  Duration           Peripheral IV 08/07/22 Proximal;Right;Ventral (anterior) Forearm 1 day          Drain  Duration           Urethral Catheter Non-latex 16 Fr  5 days                Medications:    Scheduled Meds:  Current Facility-Administered Medications   Medication Dose Route Frequency Provider Last Rate    acetaminophen  650 mg Oral Q6H PRN Taty Fofana MD      atropine  0 4 mg Intravenous Once NICOLA Voss      cefTRIAXone  2,000 mg Intravenous Q24H Taty Fofana MD Stopped (08/08/22 1200)    cholestyramine sugar free  4 g Oral BID Taty Fofana MD      fluticasone  1 spray Each Nare BID Julia Parr PA-C      fluticasone-vilanterol  1 puff Inhalation Daily Julia Parr PA-C      insulin lispro  1-5 Units Subcutaneous HS Jesica Santana PA-C      insulin lispro  2-12 Units Subcutaneous TID NICOLA Mendez      loperamide  2 mg Oral Q2H PRN Dhaval FOURNIER PA-C      metroNIDAZOLE  500 mg Intravenous Q8H Jigar Navarrete  mg (08/08/22 1603)    ondansetron  4 mg Intravenous Q6H PRN Jigar Navarrete MD      pantoprazole  40 mg Intravenous Q24H [address] Julia Parr PA-C      torsemide  40 mg Oral Daily Janneth Younger MD         PRN Meds:   acetaminophen    loperamide    ondansetron    Continuous Infusions:         LAB RESULTS:      Results from last 7 days   Lab Units 08/08/22  0519 08/07/22 2018 08/07/22  1553 08/07/22  0540 08/06/22  1618 08/06/22  0834 08/06/22  0357 08/06/22  0049 08/05/22  0905 08/05/22  0455 08/04/22  1401 08/04/22  0546 08/03/22  1443 08/03/22  0512 08/02/22  1622 08/02/22  0951 08/02/22  0423 08/02/22  0109 08/01/22  2342 08/01/22  2050 08/01/22  2050   WBC Thousand/uL 6 59  --   --  6 28  --   --  5 94  --   --  5 86  --  7 94  --  9 29  --   --  8 92 10 26*  --   --  10 44*   HEMOGLOBIN g/dL 8 9* 6 2*  --  7 2* 7 7* 7 7* 7 5* 7 7*   < > 6 9*   < > 7 1*  7 1*   < > 7 3*   < >  --  7 5* 8 1*  --   --  9 0*   I STAT HEMOGLOBIN g/dl  --   --   --   --   --   --   --   --   --   --   --   --   --   --   --   --   --   --  7 5*  --   --    HEMATOCRIT % 29 5* 20 3*  --  23 5* 24 7* 24 4* 23 5* 24 2*   < > 22 4*   < > 21 8*  22 1*   < > 22 8*   < >  --  24 4* 26 8*  --   --  29 4*   HEMATOCRIT, ISTAT %  --   --   --   --   --   --   --   --   --   --   --   --   --   --   --   --   --   --  22*  --   --    PLATELETS Thousands/uL 313  --   --  315  --   --  233  --   --  209  --  169  --  165  --   --  132* 146*  --   --  162   NEUTROS PCT % 73  --   --  73  --   --  76*  --   --  71  --  80*  --  81*  --   --  84* 80*  --    < >  --    LYMPHS PCT % 12*  --   --  14  --   --  12*  --   --  16  --  9*  --  9*  --   --  8* 11*  --    < >  --    LYMPHO PCT %  --   --   --   --   --   --   --   --   --   --   --   --   --   --   --   --   --   --   --   --  17   MONOS PCT % 8  --   --  7 --   --  7  --   --  9  --  8  --  7  --   --  5 6  --    < >  --    MONO PCT %  --   --   --   --   --   --   --   --   --   --   --   --   --   --   --   --   --   --   --   --  1*   EOS PCT % 4  --   --  4  --   --  3  --   --  3  --  2  --  1  --   --  1 1  --    < > 0   POTASSIUM mmol/L 3 8  --  3 5 3 8 3 3*  --  3 3*  --   --  3 6  --  3 5   < > 4 1   < > 4 8 5 1 5 4*  --   --  5 5*   CHLORIDE mmol/L 110*  --  110* 110* 108  --  108  --   --  107  --  109*   < > 112*   < > 116* 116* 117*  --   --  114*   CO2 mmol/L 19*  --  21 24 24  --  24  --   --  24  --  26   < > 18*   < > 14* 14* 14*  --   --  12*   CO2, I-STAT mmol/L  --   --   --   --   --   --   --   --   --   --   --   --   --   --   --   --   --   --  11*  --   --    BUN mg/dL 53*  --  59* 60* 63*  --  69*  --   --  70*  --  75*   < > 82*   < > 91* 89* 92*  --   --  95*   CREATININE mg/dL 3 40*  --  3 79* 3 96* 4 07*  --  4 27*  --   --  4 38*  --  4 72*   < > 5 02*   < > 5 38* 5 32* 5 44*  --   --  5 78*   CALCIUM mg/dL 8 3  --  8 2* 8 3 8 3  --  8 1*  --   --  8 1*  --  8 2*   < > 8 1*   < > 8 2* 8 2* 8 0*  --   --  8 8   ALK PHOS U/L  --   --   --   --   --   --   --   --   --   --   --   --   --  90  --  100 101 101  --   --  109   ALT U/L  --   --   --   --   --   --   --   --   --   --   --   --   --  17  --  21 21 17  --   --  23   AST U/L  --   --   --   --   --   --   --   --   --   --   --   --   --  14  --  18 14 13  --   --  15   GLUCOSE, ISTAT mg/dl  --   --   --   --   --   --   --   --   --   --   --   --   --   --   --   --   --   --  64*  --   --    MAGNESIUM mg/dL  --   --  1 8  --  1 9  --  1 9  --   --   --   --   --   --  1 9  --   --  1 9 1 7  --   --   --    PHOSPHORUS mg/dL  --   --   --  4 5*  --   --  5 4*  --   --   --   --  4 8*  --   --   --   --  5 1* 5 5*  --   --   --     < > = values in this interval not displayed  CUTURES:  Lab Results   Component Value Date    BLOODCX No Growth After 5 Days   10/04/2021 BLOODCX No Growth After 5 Days  10/04/2021    BLOODCX No Growth After 5 Days  06/25/2020    BLOODCX No Growth After 5 Days  06/25/2020    BLOODCX Staphylococcus coagulase negative (A) 12/04/2019    BLOODCX No Growth After 5 Days  12/04/2019    BLOODCX No Growth After 5 Days  10/29/2019    BLOODCX No Growth After 5 Days  10/29/2019    URINECX >100,000 cfu/ml Escherichia coli (A) 12/05/2019                 Portions of the record may have been created with voice recognition software  Occasional wrong word or "sound a like" substitutions may have occurred due to the inherent limitations of voice recognition software  Read the chart carefully and recognize, using context, where substitutions have occurred  If you have any questions, please contact the dictating provider

## 2022-08-08 NOTE — CONSULTS
Consult - Cardiology   Christiano Holt 73 y o  male MRN: [id number]  Unit/Bed#: -01 SDU Encounter: [id number]        Reason For Consult:  Bradycardia  Outpatient Cardiologist: Dr Harris Malave:  1  Bradycardia- reason for consultation  a  Tele 8-7 overnight into 8-8 showing AFib with slow ventricular response, heart rate in the 40s and 50s  b  6 2nd pause at 2225 on 08/07/2022  2  Nausea/vomiting, Acute diverticulitis (reason for presentation)  a  He has been seen by GI and surgery and this was managed with IV antibiotics, no procedures needed  3  Acute on chronic kidney disease  a  Creatinine on arrival 5 78, currently trending around 3 5  4  Chronic atrial fibrillation  a  On warfarin for CVA risk reduction  5  Cardiomyopathy, unspecified type  a  Echocardiograms from 2015, 2016, in 2019 showing EF 55-60% with grade 2 diastolic dysfunction  b  Echo 10/05/2021:  EF 45% with regional wall motion abnormalities, mild MR  c  Nuclear stress test 10/15/2021:  Nondiagnostic EKG, small fixed basal to inferior defect suspect due to diaphragmatic attenuation with no definitive evidence of ischemia, EF read as 36%  6  Chronic combined systolic and diastolic heart failure  a  OP diuretic torsemide 40 twice daily  b  Previous outpatient weight 126 kg at nephrology office May 2022  7  Hypertension  8  Obstructive sleep apnea  9  Dyslipidemia  10  Take 2 diabetes  11  Multiple myeloma  a  Seen by Heme-Onc July 2022 noting plan for repeat blood work to re-evaluate his myeloma  b  Receiving weekly Chemo therapy  12  Acute on chronic anemia requiring 1 unit packed red blood cells 08/05/2022  13   Peripheral vascular disease, history of left AKA    PLAN/ DISCUSSION:      Heart rate currently stable sitting in the 60s this morning, suspect bradycardia overnight secondary to sleep apnea as well as metoprolol   Agree with holding metoprolol for now   Currently no indication for pacing however should tachycardia become an issue and he is unable to tolerate AV neris blocking agents he would probably need a pacemaker in the future  Discussed with patient today   Restart warfarin once INR therapeutic    History Of Present Illness:  65-year-old gentleman who receives his cardiac care from Dr Stefany Sibley of our group  He has a longstanding history of atrial fibrillation  He is on warfarin as an outpatient  He was hospitalized in October of 2021 for heart failure  He is found have a new reduction in EF  It was 45% on echocardiogram   Nuclear stress test was performed which showed no obvious ischemia  Outpatient cardiac MRI was discussed however does not appear that this was pursued  Currently this gentleman is living at [facility]  He has been hospitalized for 1 week  He presented for nausea and vomiting and diarrhea  He was found to have acute diverticulitis  He was seen by GI and surgery and this was managed with IV antibiotics  He is also found to be in acute renal failure  This is thought to be prerenal   Nephrology has been seeing him and he has been getting some IV fluids  His renal indices are improving  Yesterday evening while sleeping he was noted to become bradycardic  He had a 6 2nd pause  He was given 1 dose of atropine  We have been asked see in consultation for his bradycardia  Past Medical History:        Past Medical History:   Diagnosis Date    Cancer [facility])     [facility])     Chronic kidney disease     COPD [facility]chronic obstructive pulmonary disease) ([facility] )     COVID-19     Decubitus ulcer of heel     LAST ASSESSED [id number]    Diabetes mellitus (HCC)     History of varicose veins     Hypertension     Intermittent claudication (HCC)     Neuropathy     Seasonal allergies       Past Surgical History:   Procedure Laterality Date    AMPUTATION ABOVE KNEE (AKA) Left 7/31/2019    Procedure: AMPUTATION ABOVE KNEE (AKA);   Surgeon: Cj Hopson Sheldon Slater MD;  Location: [facility];  Service: General    ANGIOPLASTY      W/ FLUOROSC ANGIOGRAPGY PERIPHERAL ARTERY ADDITIONAL  LAST ASSESSED [id number]    ANGIOPLASTY / STENTING FEMORAL      TANSCATH INTRAVASCULAR STENT PLACEMENT PERCUTANEOUS FEMORAL     COLONOSCOPY  2010    CT BONE MARROW BIOPSY AND ASPIRATION  8/9/2019    FULL THICKNESS SKIN GRAFT      TENDON REPAIR      TOE AMPUTATION Left 12/27/2018    Procedure: AMPUTATION left 4th TOE;  Surgeon: Chavo Lynch DPM;  Location: [facility];  Service: Podiatry        Allergy:        Allergies   Allergen Reactions    Latex Rash       Medications:       Prior to Admission medications    Medication Sig Start Date End Date Taking?  Authorizing Provider   acetaminophen (TYLENOL) 500 mg tablet Take 1 tablet (500 mg total) by mouth every 6 (six) hours as needed for mild pain, headaches or fever 10/21/19   Edvin Garcia MD   albuterol (PROVENTIL HFA,VENTOLIN HFA) 90 mcg/act inhaler Inhale 2 puffs every 6 (six) hours as needed for wheezing 4/26/19   Edvin Garcia MD   Alcohol Swabs (ALCOHOL PREP) 70 % PADS  9/18/19   Historical Provider, MD   allopurinol (ZYLOPRIM) 300 mg tablet TAKE ONE TABLET BY MOUTH DAILY  Patient taking differently: 300 mg 4/11/20   Edvin Garcia MD   ammonium lactate (LAC-HYDRIN) 12 % lotion APPLY TO BLE TOPICALLY DAILY 5/14/20   Edvin Garcia MD   atorvastatin (LIPITOR) 40 mg tablet Take 1 tablet (40 mg total) by mouth daily after dinner 7/15/20   MD MAXIMINO Marcum AUTOSHIELD DUO 30G X 5 MM MISC USE AS DIRECTED WITH INSULIN FOUR TIMES A DAY 5/9/20   Edvin Garcia MD   BD INSULIN SYRINGE U/F 31G X 5/16" 0 5 ML MISC USE AS DIRECTED WITH Ryan Iron 70/30 12/29/18   Historical Provider, MD   BD PEN NEEDLE HILARIO U/F 32G X 4 MM MISC by Other route 3 (three) times a day 6/28/19   Edvin Garcia MD   bisacodyl (DULCOLAX) 10 mg suppository Insert 10 mg into the rectum as needed for constipation  Patient not taking: No sig reported    Historical Provider, MD   bisacodyl (DULCOLAX) 5 mg EC tablet Take 5 mg by mouth 2 (two) times a day    Historical Provider, MD   bisacodyl (DULCOLAX) 5 mg EC tablet Please follow prep instructions provided by the office    Patient not taking: No sig reported 5/13/22   Annette Bernard PA-C   bortezomib (VELCADE) Infuse into a venous catheter once    Patient not taking: No sig reported    Historical Provider, MD   clotrimazole (LOTRIMIN) 1 % cream APPLY TO BUTTOCK 2 TIMES DAILY  Patient not taking: No sig reported 1/22/20   Anita Martin MD   colchicine (COLCRYS) 0 6 mg tablet Take 1 tablet (0 6 mg total) by mouth daily  Patient not taking: No sig reported 10/26/21   Keerthi Hu MD   docusate sodium (COLACE) 100 mg capsule Take 1 capsule (100 mg total) by mouth 2 (two) times a day 10/16/21   Emma Polanco MD   Easy Touch Safety Lancets 28G MISC USE 4 TIMES DAILY TO TEST BLOOD SUGAR 3/8/20   NICOLA Bermudez   fluticasone (FLONASE) 50 mcg/act nasal spray  7/18/22   Historical Provider, MD   fluticasone-salmeterol (ADVAIR DISKUS, WIXELA INHUB) 250-50 mcg/dose inhaler Inhale 1 puff 2 (two) times a day Rinse mouth after use  7/24/19   Anita Martin MD   insulin glargine (Lantus SoloStar) 100 units/mL injection pen Inject 22 Units under the skin daily 10/16/21   Emma Polanco MD   liraglutide (Victoza) injection Inject 1 8 mg under the skin daily    Historical Provider, MD   loperamide (IMODIUM) 2 mg capsule Take 2 mg by mouth 2 (two) times a day as needed for diarrhea    Historical Provider, MD   magnesium hydroxide (MILK OF MAGNESIA) 400 mg/5 mL oral suspension Take 30 mL by mouth daily as needed for constipation    Historical Provider, MD   Melatonin 5 MG TABS TAKE ONE TABLET BY MOUTH EVERY NIGHT AT BEDTIME 4/16/20   Anita Martin MD   metFORMIN (GLUCOPHAGE) 1000 MG tablet TAKE ONE TABLET BY MOUTH TWO TIMES A DAY  Patient taking differently: Take 1,000 mg by mouth 2 (two) times a day with meals 4/16/20 Aida Barbour MD   metoprolol succinate (TOPROL-XL) 25 mg 24 hr tablet Take 1 tablet (25 mg total) by mouth daily 10/17/21   Alexander Tineo MD   multivitamin-minerals therapeutic (THERA-M)  9/19/20   Historical Provider, MD   NOVOLOG FLEXPEN 100 units/mL SOPN INJ 5U BEFORE MEALS AND PER SS <250=NO CALL 250-300=5U,301-350= 10U,351-400=15U,401-450=20U>451 CALL FOR ORDERS UP TO 4X PER DAY 4/13/20   Aida Barbour MD   nystatin (MYCOSTATIN) powder Apply 353,325 application topically 4 (four) times a day    Historical Provider, MD   omeprazole (PriLOSEC) 40 MG capsule Take 1 capsule (40 mg total) by mouth daily Half an hour prior to breakfast 3/16/22   Magdy Palmer PA-C   ondansetron (ZOFRAN) 4 mg tablet ondansetron HCl 4 mg tablet   TAKE 1 TABLET BY MOUTH EVERY 8 HOURS AS NEEDED    Historical Provider, MD   polyethylene glycol (GOLYTELY) 4000 mL solution Take 4,000 mL by mouth once for 1 dose  Patient not taking: Reported on 7/18/2022 3/16/22 3/16/22  Magdy Palmer PA-C   polyethylene glycol (GOLYTELY) 4000 mL solution Please follow prep instructions provided by the office  Patient taking differently: 17 g Please follow prep instructions provided by the office 5/13/22   Candido Joshi PA-C   polyethylene glycol (MIRALAX) 17 g packet Take 17 g by mouth daily 10/17/21   Alexander Tineo MD   potassium chloride (K-DUR,KLOR-CON) 20 mEq tablet Take 2 tablets (40 mEq total) by mouth daily 10/16/21   Alexander Tineo MD   torsemide (DEMADEX) 20 mg tablet Take 2 tablets (40 mg total) by mouth 2 (two) times a day 10/16/21   Alexnader Tineo MD   warfarin (COUMADIN) 1 mg tablet Take 1 mg by mouth daily at bedtime 2 mg on Wed and Sun 3/31/21   Historical Provider, MD   warfarin (COUMADIN) 6 mg tablet  3/31/21   Historical Provider, MD       Family History:     Family History   Problem Relation Age of Onset    Other Mother         CARDIAC DISORDER     Diabetes Mother     Cancer Father    Richa Almanza Other Family CARDIAC DISORDER     Diabetes Family     Cancer Family     Mental illness Family     Kidney disease Sister     Diabetes Sister         Social History:       Social History     Socioeconomic History    Marital status:      Spouse name: None    Number of children: 1    Years of education: None    Highest education level: None   Occupational History    Occupation: RETIRED   Tobacco Use    Smoking status: Never Smoker    Smokeless tobacco: Never Used   Vaping Use    Vaping Use: Never used   Substance and Sexual Activity    Alcohol use: Not Currently    Drug use: Not Currently    Sexual activity: Not Currently   Other Topics Concern    None   Social History Narrative    DENIED ACTIVE ADVANCED DIRECTIVE    ALWAYS USES [employer]    CAFFEINE USE    [employer]    FEELS SAFE AT HOME    LIVES WITH FAMILY - SISTER    NO LIVING WILL    POA IN EXISTENCE     SUPPORTIVE AND SAFE    One son, 2 granddaughters     Social Determinants of Health     Financial Resource Strain: Not on file   Food Insecurity: Not on file   Transportation Needs: Not on file   Physical Activity: Not on file   Stress: Not on file   Social Connections: Not on file   Intimate Partner Violence: Not on file   Housing Stability: [address] Unable to Pay for Housing in the Last Year: No    Number of [place] in the Last Year: 1    Unstable Housing in the Last Year: No       ROS:  14 point ROS negative except as outlined above  Remainder review of systems is negative    Exam:  General:  alert, oriented and in no distress, cooperative  Head: Normocephalic, atraumatic  Eyes:  EOMI  Pupils - equal, round, reactive to accomodation  No icterus  Normal Conjunctiva     Oropharynx: moist and normal-appearing mucosa  Neck: supple, symmetrical, trachea midline and no JVD  Heart:  Rhythm is irregular rate is well controlled, No: murmer, rub or gallop, S1 & S2 normal   Respiratory effort / Chest Inspection: unlabored  Lungs:  normal air entry, lungs clear to auscultation and no rales, rhonchi or wheezing   Abdomen: flat, normal findings: bowel sounds normal and soft, non-tender  Lower Limbs:  1+ left lower extremity edema  Pulses[de-identified]  RLE - DP: present 2  Musculoskeletal: ROM grossly normal        DATA:                      Telemetry: Atrial fibrillation  HR 60's          Echocardiogram:           Ischemic Testing:         Weights: Wt Readings from Last 3 Encounters:   08/08/22 (!) 140 kg (309 lb 8 4 oz)   07/26/22 131 kg (288 lb 5 8 oz)   07/19/22 127 kg (281 lb 1 4 oz)   , Body mass index is 41 98 kg/m²  Lab Studies:             Results from last 7 days   Lab Units 08/08/22  0519 08/07/22  2018 08/07/22  0540 08/06/22  0834 08/06/22  0357   WBC Thousand/uL 6 59  --  6 28  --  5 94   HEMOGLOBIN g/dL 8 9* 6 2* 7 2*   < > 7 5*   HEMATOCRIT % 29 5* 20 3* 23 5*   < > 23 5*   PLATELETS Thousands/uL 313  --  315  --  233    < > = values in this interval not displayed  ,   Results from last 7 days   Lab Units 08/08/22  0519 08/07/22  1553 08/07/22  0540 08/03/22  1443 08/03/22  0512 08/02/22  1622 08/02/22  0951 08/02/22  0423 08/02/22  0109 08/01/22  2342   POTASSIUM mmol/L 3 8 3 5 3 8   < > 4 1   < > 4 8 5 1   < >  --    CHLORIDE mmol/L 110* 110* 110*   < > 112*   < > 116* 116*   < >  --    CO2 mmol/L 19* 21 24   < > 18*   < > 14* 14*   < >  --    CO2, I-STAT mmol/L  --   --   --   --   --   --   --   --   --  11*   BUN mg/dL 53* 59* 60*   < > 82*   < > 91* 89*   < >  --    CREATININE mg/dL 3 40* 3 79* 3 96*   < > 5 02*   < > 5 38* 5 32*   < >  --    CALCIUM mg/dL 8 3 8 2* 8 3   < > 8 1*   < > 8 2* 8 2*   < >  --    ALK PHOS U/L  --   --   --   --  90  --  100 101   < >  --    ALT U/L  --   --   --   --  17  --  21 21   < >  --    AST U/L  --   --   --   --  14  --  18 14   < >  --    GLUCOSE, ISTAT mg/dl  --   --   --   --   --   --   --   --   --  64*    < > = values in this interval not displayed

## 2022-08-08 NOTE — QUICK NOTE
Patient noted throughout 8/7 to have HR fluctuating in the 40-50s and transiently into the 30's  He remained HD stable and asymptomatic  His lopressor was discontinued and he received calcium IV x1 in the evening  Throughout the night, his HR fluctuates in the 40-50s, however he is having repeated, more sustained episodes of asymptomatic bradycardia into the low 20s  He remains HD stable and has no complaints  He is notably sleeping during these episodes  Formal EKG shows afib  Will give atropine 0 4mg IV x1, continue telemetry, consult cardiology  Primary service aware

## 2022-08-08 NOTE — ASSESSMENT & PLAN NOTE
Patient was admitted with acute kidney injury on stage IV chronic kidney disease with baseline creatinine of 1 8-2 0 likely due to ATN  He was treated with IV fluids nephrology was following the patient    Patient received IV Lasix as well    Renal function is improving, creatinine decreased to 3 4 today    Continue Montague catheter, continue monitor renal function

## 2022-08-08 NOTE — ASSESSMENT & PLAN NOTE
Patient has history of anemia in setting of CKD/multiple myeloma  He denies signs of GI bleeding  He required blood transfusion for the anemia  Hemoglobin increased to 8 9 this morning      Patient had EGD and colonoscopy this year    Will monitor anemia closely

## 2022-08-08 NOTE — DISCHARGE INSTR - OTHER ORDERS
Skin care plans:  1-Hydraguard to bilateral sacrum, buttock and  Right heel BID and PRN  2-Elevate right heel to offload pressure  3-Ehob cushion in chair when out of bed  4-Moisturize skin daily with skin nourishing cream   5-Turn/reposition q2h or when medically stable for pressure re-distribution on skin   Use foam wedges

## 2022-08-08 NOTE — WOUND OSTOMY CARE
Consult Note - Wound   Naima Salcido 73 y o  male MRN: [id number]  Unit/Bed#: -01 SDU Encounter: [id number]        History and Present Illness:  73year old male admitted with acute diverticulitis  Alert and oriented x4  Currently in ICU  Able to turn and reposition with moderate assistance  PMH:Morbid obesity,PVD,CKD,L AKA  Pt has mehta catheter for bladder management  1)Right heel pink, blanchable heel offloaded  2) Sacrum and bilateral buttocks clean and dry  Skin nourishing cream applied, positioned on side with foam wedges  Patients states he does develop intermittent MASD without mehta catheter  Skin care plans:  1-Hydraguard to bilateral sacrum, buttock and  Right heel BID and PRN  2-Elevate right heel to offload pressure  3-Ehob cushion in chair when out of bed  4-Moisturize skin daily with skin nourishing cream   5-Turn/reposition q2h or when medically stable for pressure re-distribution on skin   Use foam wedges       Wound Care will sign off  Call or Tigertext with any questions       Jaleel SOTON RN

## 2022-08-08 NOTE — ASSESSMENT & PLAN NOTE
Patient has chronic atrial fibrillation    He was on Toprol and warfarin  He developed bradycardia with heart rates in the 20s last night  Metoprolol was discontinued    Cardiology is to see patient    INR 3 16 today, will hold warfarin today

## 2022-08-08 NOTE — PROGRESS NOTES
[place]  Progress Note - Cathy  1950, 73 y o  male MRN: [id number]  Unit/Bed#: -01 SDU Encounter: [id number]  Primary Care Provider: Consuelo Gray MD   Date and time admitted to hospital: 8/1/2022  8:27 PM    * Acute diverticulitis  Assessment & Plan  Patient admitted with nausea, vomiting, diarrhea due to acute sigmoid diverticulitis  Patient was treated with IV fluids, IV Rocephin Flagyl  He had no diarrhea last 24 hours    I called patient's son left message on his voicemail on August 8th      DORA (acute kidney injury) [facility])  Assessment & Plan  Patient was admitted with acute kidney injury on stage IV chronic kidney disease with baseline creatinine of 1 8-2 0 likely due to ATN  He was treated with IV fluids nephrology was following the patient  Patient received IV Lasix as well    Renal function is improving, creatinine decreased to 3 4 today    Continue Montague catheter, continue monitor renal function    Acute on chronic combined systolic and diastolic congestive heart failure [facility])  Assessment & Plan  Wt Readings from Last 3 Encounters:   08/08/22 (!) 140 kg (309 lb 8 4 oz)   07/26/22 131 kg (288 lb 5 8 oz)   07/19/22 127 kg (281 lb 1 4 oz)       Patient has chronic systolic CHF with ejection fraction of 45% in October last year  He had fluid overload from IV fluids that he received for acute sigmoid diverticulitis and acute kidney injury  He was treated with IV Lasix subsequently    He denies shortness of breath today  Torsemide has been on hold, I will resume torsemide if okay with Nephrology          Chronic atrial fibrillation [facility])  Assessment & Plan  Patient has chronic atrial fibrillation    He was on Toprol and warfarin  He developed bradycardia with heart rates in the 20s last night  Metoprolol was discontinued    Cardiology is to see patient    INR 3 16 today, will hold warfarin today    Anemia  Assessment & Plan  Patient has history of anemia in setting of CKD/multiple myeloma  He denies signs of GI bleeding  He required blood transfusion for the anemia  Hemoglobin increased to 8 9 this morning  Patient had EGD and colonoscopy this year    Will monitor anemia closely        VTE Prophylaxis: in place    Patient Centered Rounds: I rounded with patient's nurse    Current Length of Stay: 7 day(s)    Current Patient Status: Inpatient    Certification Statement: Pt requires additional inpatient hospital stay due to: see assessment and plan        Subjective:   Patient's nurse reported that patient had no diarrhea or signs of GI or  bleeding  Patient received 1 unit of packed red blood cell transfusion  He was bradycardic requiring IV atropine last night  Metoprolol discontinued  Patient denies dizziness, lightheadedness, chest pain, palpitations, shortness breath, abdominal pain, nausea this morning    All other ROS are negative    Objective:     Vitals:   Temp (24hrs), Av 1 °F (36 7 °C), Min:97 4 °F (36 3 °C), Max:98 6 °F (37 °C)    Temp:  [97 4 °F (36 3 °C)-98 6 °F (37 °C)] 98 °F (36 7 °C)  HR:  [46-66] 58  Resp:  [18-26] 20  BP: (124-145)/(58-97) 132/97  SpO2:  [92 %-99 %] 97 %  Body mass index is 41 98 kg/m²  Input and Output Summary (last 24 hours): Intake/Output Summary (Last 24 hours) at 2022 0827  Last data filed at 2022 0701  Gross per 24 hour   Intake 2072 5 ml   Output 1625 ml   Net 447 5 ml       Physical Exam:     Physical Exam  Constitutional:       General: He is not in acute distress  Appearance: He is not toxic-appearing  HENT:      Head: Normocephalic  Eyes:      Conjunctiva/sclera: Conjunctivae normal    Cardiovascular:      Rate and Rhythm: Normal rate  Rhythm irregular  Pulmonary:      Effort: No respiratory distress  Breath sounds: Rales (Scant crackles are heard posteriorly) present  No wheezing  Abdominal:      General: Bowel sounds are normal  There is no distension  Palpations: Abdomen is soft  Tenderness: There is no abdominal tenderness  Musculoskeletal:      Comments: He status post left AKA   Skin:     General: Skin is warm and dry  Neurological:      Mental Status: He is alert  Cranial Nerves: No cranial nerve deficit  Motor: No weakness ( moves arms and right leg on command)  I personally reviewed labs and imaging reports for today  Last 24 Hours Medication List:   Current Facility-Administered Medications   Medication Dose Route Frequency Provider Last Rate    acetaminophen  650 mg Oral Q6H PRN Marcus Mao MD      atropine  0 4 mg Intravenous Once NICOLA López      cefTRIAXone  2,000 mg Intravenous Q24H Marcus Mao MD 2,000 mg (08/08/22 0732)    cholestyramine sugar free  4 g Oral BID Marcus Mao MD      fluticasone  1 spray Each Nare BID Julia Parr PA-C      fluticasone-vilanterol  1 puff Inhalation Daily Julia Parr PA-C      insulin lispro  1-5 Units Subcutaneous HS Jesica Santana PA-C      insulin lispro  2-12 Units Subcutaneous TID AC NICOLA Morales      loperamide  2 mg Oral Q2H PRN Kimberli FOURNIER PA-C      metroNIDAZOLE  500 mg Intravenous Q8H Marcus Mao  mg (08/08/22 0207)    ondansetron  4 mg Intravenous Q6H PRN Marcus Mao MD      pantoprazole  40 mg Intravenous Q24H [address] Julia Parr PA-C            Today, Patient Was Seen By: Dickson Winchester MD    ** Please Note: Dictation voice to text software may have been used in the creation of this document   **

## 2022-08-08 NOTE — ASSESSMENT & PLAN NOTE
Wt Readings from Last 3 Encounters:   08/08/22 (!) 140 kg (309 lb 8 4 oz)   07/26/22 131 kg (288 lb 5 8 oz)   07/19/22 127 kg (281 lb 1 4 oz)       Patient has chronic systolic CHF with ejection fraction of 45% in October last year  He had fluid overload from IV fluids that he received for acute sigmoid diverticulitis and acute kidney injury  He was treated with IV Lasix subsequently    He denies shortness of breath today    Torsemide has been on hold, I will resume torsemide if okay with Nephrology

## 2022-08-08 NOTE — ASSESSMENT & PLAN NOTE
Patient admitted with nausea, vomiting, diarrhea due to acute sigmoid diverticulitis  Patient was treated with IV fluids, IV Rocephin Flagyl      He had no diarrhea last 24 hours    I called patient's son left message on his voicemail on August 8th

## 2022-08-08 NOTE — PLAN OF CARE
Problem: MOBILITY - ADULT  Goal: Maintain or return to baseline ADL function  Description: INTERVENTIONS:  -  Assess patient's ability to carry out ADLs; assess patient's baseline for ADL function and identify physical deficits which impact ability to perform ADLs (bathing, care of mouth/teeth, toileting, grooming, dressing, etc )  - Assess/evaluate cause of self-care deficits   - Assess range of motion  - Assess patient's mobility; develop plan if impaired  - Assess patient's need for assistive devices and provide as appropriate  - Encourage maximum independence but intervene and supervise when necessary  - Involve family in performance of ADLs  - Assess for home care needs following discharge   - Consider OT consult to assist with ADL evaluation and planning for discharge  - Provide patient education as appropriate  Outcome: Progressing  Goal: Maintains/Returns to pre admission functional level  Description: INTERVENTIONS:  - Perform BMAT or MOVE assessment daily    - Set and communicate daily mobility goal to care team and patient/family/caregiver     - Collaborate with rehabilitation services on mobility goals if consulted  - Out of bed for toileting  - Record patient progress and toleration of activity level   Outcome: Progressing     Problem: Prexisting or High Potential for Compromised Skin Integrity  Goal: Skin integrity is maintained or improved  Description: INTERVENTIONS:  - Identify patients at risk for skin breakdown  - Assess and monitor skin integrity  - Assess and monitor nutrition and hydration status  - Monitor labs   - Assess for incontinence   - Turn and reposition patient  - Assist with mobility/ambulation  - Relieve pressure over bony prominences  - Avoid friction and shearing  - Provide appropriate hygiene as needed including keeping skin clean and dry  - Evaluate need for skin moisturizer/barrier cream  - Collaborate with interdisciplinary team   - Patient/family teaching  - Consider wound care consult   Outcome: Progressing     Problem: PAIN - ADULT  Goal: Verbalizes/displays adequate comfort level or baseline comfort level  Description: Interventions:  - Encourage patient to monitor pain and request assistance  - Assess pain using appropriate pain scale  - Administer analgesics based on type and severity of pain and evaluate response  - Implement non-pharmacological measures as appropriate and evaluate response  - Consider cultural and social influences on pain and pain management  - Notify physician/advanced practitioner if interventions unsuccessful or patient reports new pain  Outcome: Progressing     Problem: INFECTION - ADULT  Goal: Absence or prevention of progression during hospitalization  Description: INTERVENTIONS:  - Assess and monitor for signs and symptoms of infection  - Monitor lab/diagnostic results  - Monitor all insertion sites, i e  indwelling lines, tubes, and drains  - Monitor endotracheal if appropriate and nasal secretions for changes in amount and color  - [place] appropriate cooling/warming therapies per order  - Administer medications as ordered  - Instruct and encourage patient and family to use good hand hygiene technique  - Identify and instruct in appropriate isolation precautions for identified infection/condition  Outcome: Progressing  Goal: Absence of fever/infection during neutropenic period  Description: INTERVENTIONS:  - Monitor WBC    Outcome: Progressing     Problem: SAFETY ADULT  Goal: Maintain or return to baseline ADL function  Description: INTERVENTIONS:  -  Assess patient's ability to carry out ADLs; assess patient's baseline for ADL function and identify physical deficits which impact ability to perform ADLs (bathing, care of mouth/teeth, toileting, grooming, dressing, etc )  - Assess/evaluate cause of self-care deficits   - Assess range of motion  - Assess patient's mobility; develop plan if impaired  - Assess patient's need for assistive devices and provide as appropriate  - Encourage maximum independence but intervene and supervise when necessary  - Involve family in performance of ADLs  - Assess for home care needs following discharge   - Consider OT consult to assist with ADL evaluation and planning for discharge  - Provide patient education as appropriate  Outcome: Progressing  Goal: Maintains/Returns to pre admission functional level  Description: INTERVENTIONS:  - Perform BMAT or MOVE assessment daily    - Set and communicate daily mobility goal to care team and patient/family/caregiver     - Collaborate with rehabilitation services on mobility goals if consulted  - Out of bed for toileting  - Record patient progress and toleration of activity level   Outcome: Progressing  Goal: Patient will remain free of falls  Description: INTERVENTIONS:  - Educate patient/family on patient safety including physical limitations  - Instruct patient to call for assistance with activity   - Consult OT/PT to assist with strengthening/mobility   - Keep Call bell within reach  - Keep bed low and locked with side rails adjusted as appropriate  - Keep care items and personal belongings within reach  - Initiate and maintain comfort rounds  - Make Fall Risk Sign visible to staff  - Apply yellow socks and bracelet for high fall risk patients  - Consider moving patient to room near nurses station  Outcome: Progressing     Problem: DISCHARGE PLANNING  Goal: Discharge to home or other facility with appropriate resources  Description: INTERVENTIONS:  - Identify barriers to discharge w/patient and caregiver  - Arrange for needed discharge resources and transportation as appropriate  - Identify discharge learning needs (meds, wound care, etc )  - Arrange for interpretive services to assist at discharge as needed  - Refer to Case Management Department for coordinating discharge planning if the patient needs post-hospital services based on physician/advanced practitioner order or complex needs related to functional status, cognitive ability, or social support system  Outcome: Progressing     Problem: Knowledge Deficit  Goal: Patient/family/caregiver demonstrates understanding of disease process, treatment plan, medications, and discharge instructions  Description: Complete learning assessment and assess knowledge base  Interventions:  - Provide teaching at level of understanding  - Provide teaching via preferred learning methods  Outcome: Progressing     Problem: Potential for Falls  Goal: Patient will remain free of falls  Description: INTERVENTIONS:  - Educate patient/family on patient safety including physical limitations  - Instruct patient to call for assistance with activity   - Consult OT/PT to assist with strengthening/mobility   - Keep Call bell within reach  - Keep bed low and locked with side rails adjusted as appropriate  - Keep care items and personal belongings within reach  - Initiate and maintain comfort rounds  - Make Fall Risk Sign visible to staff  - Apply yellow socks and bracelet for high fall risk patients  - Consider moving patient to room near nurses station  Outcome: Progressing     Problem: Nutrition/Hydration-ADULT  Goal: Nutrient/Hydration intake appropriate for improving, restoring or maintaining nutritional needs  Description: Monitor and assess patient's nutrition/hydration status for malnutrition  Collaborate with interdisciplinary team and initiate plan and interventions as ordered  Monitor patient's weight and dietary intake as ordered or per policy  Utilize nutrition screening tool and intervene as necessary  Determine patient's food preferences and provide high-protein, high-caloric foods as appropriate       INTERVENTIONS:  - Monitor oral intake, urinary output, labs, and treatment plans  - Assess nutrition and hydration status and recommend course of action  - Evaluate amount of meals eaten  - Assist patient with eating if necessary   - Allow adequate time for meals  - Recommend/ encourage appropriate diets, oral nutritional supplements, and vitamin/mineral supplements  - Order, calculate, and assess calorie counts as needed  - Recommend, monitor, and adjust tube feedings and TPN/PPN based on assessed needs  - Assess need for intravenous fluids  - Provide specific nutrition/hydration education as appropriate  - Include patient/family/caregiver in decisions related to nutrition  Outcome: Progressing

## 2022-08-09 PROBLEM — E87.5 HYPERKALEMIA: Status: RESOLVED | Noted: 2022-08-02 | Resolved: 2022-08-09

## 2022-08-09 LAB
ANION GAP SERPL CALCULATED.3IONS-SCNC: 13 MMOL/L (ref 4–13)
BUN SERPL-MCNC: 48 MG/DL (ref 5–25)
CALCIUM SERPL-MCNC: 8 MG/DL (ref 8.3–10.1)
CHLORIDE SERPL-SCNC: 110 MMOL/L (ref 96–108)
CO2 SERPL-SCNC: 21 MMOL/L (ref 21–32)
CREAT SERPL-MCNC: 3.3 MG/DL (ref 0.6–1.3)
ERYTHROCYTE [DISTWIDTH] IN BLOOD BY AUTOMATED COUNT: 19.8 % (ref 11.6–15.1)
GFR SERPL CREATININE-BSD FRML MDRD: 18 ML/MIN/1.73SQ M
GLUCOSE SERPL-MCNC: 106 MG/DL (ref 65–140)
GLUCOSE SERPL-MCNC: 116 MG/DL (ref 65–140)
GLUCOSE SERPL-MCNC: 187 MG/DL (ref 65–140)
GLUCOSE SERPL-MCNC: 227 MG/DL (ref 65–140)
GLUCOSE SERPL-MCNC: 234 MG/DL (ref 65–140)
GLUCOSE SERPL-MCNC: 418 MG/DL (ref 65–140)
HCT VFR BLD AUTO: 27.5 % (ref 36.5–49.3)
HGB BLD-MCNC: 8.1 G/DL (ref 12–17)
INR PPP: 3.53 (ref 0.84–1.19)
MCH RBC QN AUTO: 26 PG (ref 26.8–34.3)
MCHC RBC AUTO-ENTMCNC: 29.5 G/DL (ref 31.4–37.4)
MCV RBC AUTO: 88 FL (ref 82–98)
PLATELET # BLD AUTO: 347 THOUSANDS/UL (ref 149–390)
PMV BLD AUTO: 10.2 FL (ref 8.9–12.7)
POTASSIUM SERPL-SCNC: 3.2 MMOL/L (ref 3.5–5.3)
PROTHROMBIN TIME: 37 SECONDS (ref 11.6–14.5)
RBC # BLD AUTO: 3.12 MILLION/UL (ref 3.88–5.62)
SODIUM SERPL-SCNC: 144 MMOL/L (ref 135–147)
WBC # BLD AUTO: 6.89 THOUSAND/UL (ref 4.31–10.16)

## 2022-08-09 PROCEDURE — C9113 INJ PANTOPRAZOLE SODIUM, VIA: HCPCS | Performed by: PHYSICIAN ASSISTANT

## 2022-08-09 PROCEDURE — 99232 SBSQ HOSP IP/OBS MODERATE 35: CPT | Performed by: INTERNAL MEDICINE

## 2022-08-09 PROCEDURE — 85027 COMPLETE CBC AUTOMATED: CPT | Performed by: INTERNAL MEDICINE

## 2022-08-09 PROCEDURE — 94760 N-INVAS EAR/PLS OXIMETRY 1: CPT

## 2022-08-09 PROCEDURE — 82948 REAGENT STRIP/BLOOD GLUCOSE: CPT

## 2022-08-09 PROCEDURE — 94002 VENT MGMT INPAT INIT DAY: CPT

## 2022-08-09 PROCEDURE — 80048 BASIC METABOLIC PNL TOTAL CA: CPT | Performed by: INTERNAL MEDICINE

## 2022-08-09 PROCEDURE — 85610 PROTHROMBIN TIME: CPT | Performed by: INTERNAL MEDICINE

## 2022-08-09 RX ORDER — CEPHALEXIN 250 MG/1
500 CAPSULE ORAL 3 TIMES DAILY
Status: DISCONTINUED | OUTPATIENT
Start: 2022-08-09 | End: 2022-08-10 | Stop reason: HOSPADM

## 2022-08-09 RX ORDER — POTASSIUM CHLORIDE 20 MEQ/1
20 TABLET, EXTENDED RELEASE ORAL 2 TIMES DAILY
Status: DISCONTINUED | OUTPATIENT
Start: 2022-08-09 | End: 2022-08-10 | Stop reason: HOSPADM

## 2022-08-09 RX ORDER — METRONIDAZOLE 500 MG/1
500 TABLET ORAL EVERY 8 HOURS SCHEDULED
Status: DISCONTINUED | OUTPATIENT
Start: 2022-08-09 | End: 2022-08-10 | Stop reason: HOSPADM

## 2022-08-09 RX ADMIN — POTASSIUM CHLORIDE 20 MEQ: 1500 TABLET, EXTENDED RELEASE ORAL at 17:14

## 2022-08-09 RX ADMIN — FLUTICASONE FUROATE AND VILANTEROL TRIFENATATE 1 PUFF: 200; 25 POWDER RESPIRATORY (INHALATION) at 08:38

## 2022-08-09 RX ADMIN — FLUTICASONE PROPIONATE 1 SPRAY: 50 SPRAY, METERED NASAL at 17:15

## 2022-08-09 RX ADMIN — INSULIN LISPRO 4 UNITS: 100 INJECTION, SOLUTION INTRAVENOUS; SUBCUTANEOUS at 15:32

## 2022-08-09 RX ADMIN — FLUTICASONE PROPIONATE 1 SPRAY: 50 SPRAY, METERED NASAL at 08:38

## 2022-08-09 RX ADMIN — METRONIDAZOLE 500 MG: 500 TABLET ORAL at 08:40

## 2022-08-09 RX ADMIN — METRONIDAZOLE 500 MG: 500 TABLET ORAL at 21:40

## 2022-08-09 RX ADMIN — INSULIN LISPRO 4 UNITS: 100 INJECTION, SOLUTION INTRAVENOUS; SUBCUTANEOUS at 11:56

## 2022-08-09 RX ADMIN — LOPERAMIDE HYDROCHLORIDE 2 MG: 2 CAPSULE ORAL at 05:47

## 2022-08-09 RX ADMIN — INSULIN LISPRO 1 UNITS: 100 INJECTION, SOLUTION INTRAVENOUS; SUBCUTANEOUS at 21:40

## 2022-08-09 RX ADMIN — TORSEMIDE 40 MG: 20 TABLET ORAL at 08:40

## 2022-08-09 RX ADMIN — CHOLESTYRAMINE 4 G: 4 POWDER, FOR SUSPENSION ORAL at 08:39

## 2022-08-09 RX ADMIN — POTASSIUM CHLORIDE 20 MEQ: 1500 TABLET, EXTENDED RELEASE ORAL at 08:40

## 2022-08-09 RX ADMIN — CEPHALEXIN 500 MG: 250 CAPSULE ORAL at 08:40

## 2022-08-09 RX ADMIN — METRONIDAZOLE 500 MG: 500 TABLET ORAL at 15:31

## 2022-08-09 RX ADMIN — CEPHALEXIN 500 MG: 250 CAPSULE ORAL at 21:40

## 2022-08-09 RX ADMIN — CHOLESTYRAMINE 4 G: 4 POWDER, FOR SUSPENSION ORAL at 17:14

## 2022-08-09 RX ADMIN — LOPERAMIDE HYDROCHLORIDE 2 MG: 2 CAPSULE ORAL at 17:17

## 2022-08-09 RX ADMIN — CEPHALEXIN 500 MG: 250 CAPSULE ORAL at 15:31

## 2022-08-09 RX ADMIN — PANTOPRAZOLE SODIUM 40 MG: 40 INJECTION, POWDER, FOR SOLUTION INTRAVENOUS at 08:39

## 2022-08-09 NOTE — ASSESSMENT & PLAN NOTE
Patient was admitted with nausea, vomiting, diarrhea due to acute sigmoid diverticulitis  Patient was treated with IV fluids, IV Rocephin Flagyl  Patient claims that he has chronic left-sided abdominal pain it is about the same today as before  Or will switch patient to p o   Keflex and Flagyl for 2 more days for treatment of the acute sigmoid diverticulitis    I left message on signs voicemail on August 9th

## 2022-08-09 NOTE — PROGRESS NOTES
[place]  Progress Note - Sara Avalos 1950, 73 y o  male MRN: [id number]  Unit/Bed#: -01 SDU Encounter: [id number]  Primary Care Provider: Lisa Monk MD   Date and time admitted to hospital: 8/1/2022  8:27 PM    * Acute diverticulitis  Assessment & Plan  Patient was admitted with nausea, vomiting, diarrhea due to acute sigmoid diverticulitis  Patient was treated with IV fluids, IV Rocephin Flagyl  Patient claims that he has chronic left-sided abdominal pain it is about the same today as before  Or will switch patient to p o  Keflex and Flagyl for 2 more days for treatment of the acute sigmoid diverticulitis    I left message on signs voicemail on August 9th      DORA (acute kidney injury) [facility])  Assessment & Plan  Patient was admitted with acute kidney injury on stage IV chronic kidney disease with baseline creatinine of 1 8-2 0 likely due to ATN  He was treated with IV fluids nephrology was following the patient  Patient received IV Lasix as well    Renal function is improving, creatinine decreased to 3 3 today after resuming torsemide yesterday    Continue [place] catheter, continue monitor renal function    Chronic atrial fibrillation [facility])  Assessment & Plan  Patient has chronic atrial fibrillation    He was on Toprol and warfarin  He developed bradycardia with heart rates in the 20s during the hospital stay and his Metoprolol was discontinued  Patient's heart rate was as low as 25 during sleep for a brief period of time    Continue telemetry monitoring, holding metoprolol  INR 3 53 today, will hold warfarin today    Anemia  Assessment & Plan  Patient has history of anemia in setting of CKD/multiple myeloma  He had no signs of GI or  bleeding  He required blood transfusion for the anemia  Hemoglobin is 8 1 today      Patient had EGD and colonoscopy this year    Will monitor anemia closely          VTE Prophylaxis: in place    Patient Centered Rounds: I rounded with patient's nurse    Current Length of Stay: 8 day(s)    Current Patient Status: Inpatient    Certification Statement: Pt requires additional inpatient hospital stay due to: see assessment and plan        Subjective:   Patient's nurse told me that he had loose bowel movements without signs of GI bleeding  The bowel movements are not watery  Patient's heart rate was as low as 25 beats per minute for over short period of time at night while he was asleep    Patient denies any chest pain, palpitations, nausea, signs of GI bleeding    All other ROS are negative    Objective:     Vitals:   Temp (24hrs), Av 4 °F (36 9 °C), Min:98 1 °F (36 7 °C), Max:98 7 °F (37 1 °C)    Temp:  [98 1 °F (36 7 °C)-98 7 °F (37 1 °C)] 98 7 °F (37 1 °C)  HR:  [46-70] 58  Resp:  [18-24] 19  BP: (122-165)/(60-99) 142/67  SpO2:  [94 %-99 %] 99 %  Body mass index is 41 44 kg/m²  Input and Output Summary (last 24 hours): Intake/Output Summary (Last 24 hours) at 2022 6488  Last data filed at 2022 1846  Gross per 24 hour   Intake 1220 ml   Output 1975 ml   Net -755 ml       Physical Exam:     Physical Exam  Constitutional:       General: He is not in acute distress  Appearance: He is not toxic-appearing  HENT:      Head: Normocephalic  Cardiovascular:      Comments: Irregular irregular rate, controlled rates  Pulmonary:      Effort: No respiratory distress  Breath sounds: No rales  Abdominal:      General: Bowel sounds are normal       Palpations: Abdomen is soft  Tenderness: There is abdominal tenderness (Patient reports chronic left side abdominal pain and tenderness without change)  Musculoskeletal:      Comments: Status post left AKA   Skin:     General: Skin is warm and dry  Comments: He has no right lower extremity edema   Neurological:      Mental Status: He is alert and oriented to person, place, and time  Mental status is at baseline               I personally reviewed labs and imaging reports for today  Last 24 Hours Medication List:   Current Facility-Administered Medications   Medication Dose Route Frequency Provider Last Rate    acetaminophen  650 mg Oral Q6H PRN Sherry White MD      atropine  0 4 mg Intravenous Once JerryNICOLA Pena      cephalexin  500 mg Oral TID Jenn Elliott MD      cholestyramine sugar free  4 g Oral BID Sherry White MD      fluticasone  1 spray Each Nare BID Julia Parr PA-C      fluticasone-vilanterol  1 puff Inhalation Daily Julia Parr PA-C      insulin lispro  1-5 Units Subcutaneous HS Jesica Santana PA-C      insulin lispro  2-12 Units Subcutaneous TID AC NICOLA Morales      loperamide  2 mg Oral Q2H PRN Kimberli FOURNIER PA-C      metroNIDAZOLE  500 mg Oral Q8H Raheel Tejada MD      ondansetron  4 mg Intravenous Q6H PRN Sherry White MD      pantoprazole  40 mg Intravenous Q24H [address] Julia Parr PA-C      potassium chloride  20 mEq Oral BID Jenn Elliott MD      torsemide  40 mg Oral Daily Jenn Elliott MD            Today, Patient Was Seen By: Jenn Elliott MD    ** Please Note: Dictation voice to text software may have been used in the creation of this document   **

## 2022-08-09 NOTE — PLAN OF CARE
Problem: MOBILITY - ADULT  Goal: Maintain or return to baseline ADL function  Description: INTERVENTIONS:  -  Assess patient's ability to carry out ADLs; assess patient's baseline for ADL function and identify physical deficits which impact ability to perform ADLs (bathing, care of mouth/teeth, toileting, grooming, dressing, etc )  - Assess/evaluate cause of self-care deficits   - Assess range of motion  - Assess patient's mobility; develop plan if impaired  - Assess patient's need for assistive devices and provide as appropriate  - Encourage maximum independence but intervene and supervise when necessary  - Involve family in performance of ADLs  - Assess for home care needs following discharge   - Consider OT consult to assist with ADL evaluation and planning for discharge  - Provide patient education as appropriate  Outcome: Progressing  Goal: Maintains/Returns to pre admission functional level  Description: INTERVENTIONS:  - Perform BMAT or MOVE assessment daily    - Set and communicate daily mobility goal to care team and patient/family/caregiver     - Collaborate with rehabilitation services on mobility goals if consulted  - Out of bed for toileting  - Record patient progress and toleration of activity level   Outcome: Progressing     Problem: Prexisting or High Potential for Compromised Skin Integrity  Goal: Skin integrity is maintained or improved  Description: INTERVENTIONS:  - Identify patients at risk for skin breakdown  - Assess and monitor skin integrity  - Assess and monitor nutrition and hydration status  - Monitor labs   - Assess for incontinence   - Turn and reposition patient  - Assist with mobility/ambulation  - Relieve pressure over bony prominences  - Avoid friction and shearing  - Provide appropriate hygiene as needed including keeping skin clean and dry  - Evaluate need for skin moisturizer/barrier cream  - Collaborate with interdisciplinary team   - Patient/family teaching  - Consider wound care consult   Outcome: Progressing     Problem: PAIN - ADULT  Goal: Verbalizes/displays adequate comfort level or baseline comfort level  Description: Interventions:  - Encourage patient to monitor pain and request assistance  - Assess pain using appropriate pain scale  - Administer analgesics based on type and severity of pain and evaluate response  - Implement non-pharmacological measures as appropriate and evaluate response  - Consider cultural and social influences on pain and pain management  - Notify physician/advanced practitioner if interventions unsuccessful or patient reports new pain  Outcome: Progressing     Problem: INFECTION - ADULT  Goal: Absence or prevention of progression during hospitalization  Description: INTERVENTIONS:  - Assess and monitor for signs and symptoms of infection  - Monitor lab/diagnostic results  - Monitor all insertion sites, i e  indwelling lines, tubes, and drains  - Monitor endotracheal if appropriate and nasal secretions for changes in amount and color  - [place] appropriate cooling/warming therapies per order  - Administer medications as ordered  - Instruct and encourage patient and family to use good hand hygiene technique  - Identify and instruct in appropriate isolation precautions for identified infection/condition  Outcome: Progressing  Goal: Absence of fever/infection during neutropenic period  Description: INTERVENTIONS:  - Monitor WBC    Outcome: Progressing     Problem: SAFETY ADULT  Goal: Maintain or return to baseline ADL function  Description: INTERVENTIONS:  -  Assess patient's ability to carry out ADLs; assess patient's baseline for ADL function and identify physical deficits which impact ability to perform ADLs (bathing, care of mouth/teeth, toileting, grooming, dressing, etc )  - Assess/evaluate cause of self-care deficits   - Assess range of motion  - Assess patient's mobility; develop plan if impaired  - Assess patient's need for assistive devices and provide as appropriate  - Encourage maximum independence but intervene and supervise when necessary  - Involve family in performance of ADLs  - Assess for home care needs following discharge   - Consider OT consult to assist with ADL evaluation and planning for discharge  - Provide patient education as appropriate  Outcome: Progressing  Goal: Maintains/Returns to pre admission functional level  Description: INTERVENTIONS:  - Perform BMAT or MOVE assessment daily    - Set and communicate daily mobility goal to care team and patient/family/caregiver     - Collaborate with rehabilitation services on mobility goals if consulted  - Record patient progress and toleration of activity level   Outcome: Progressing  Goal: Patient will remain free of falls  Description: INTERVENTIONS:  - Educate patient/family on patient safety including physical limitations  - Instruct patient to call for assistance with activity   - Consult OT/PT to assist with strengthening/mobility   - Keep Call bell within reach  - Keep bed low and locked with side rails adjusted as appropriate  - Keep care items and personal belongings within reach  - Initiate and maintain comfort rounds  - Make Fall Risk Sign visible to staff  - Apply yellow socks and bracelet for high fall risk patients  - Consider moving patient to room near nurses station  Outcome: Progressing     Problem: DISCHARGE PLANNING  Goal: Discharge to home or other facility with appropriate resources  Description: INTERVENTIONS:  - Identify barriers to discharge w/patient and caregiver  - Arrange for needed discharge resources and transportation as appropriate  - Identify discharge learning needs (meds, wound care, etc )  - Arrange for interpretive services to assist at discharge as needed  - Refer to Case Management Department for coordinating discharge planning if the patient needs post-hospital services based on physician/advanced practitioner order or complex needs related to functional status, cognitive ability, or social support system  Outcome: Progressing     Problem: Knowledge Deficit  Goal: Patient/family/caregiver demonstrates understanding of disease process, treatment plan, medications, and discharge instructions  Description: Complete learning assessment and assess knowledge base  Interventions:  - Provide teaching at level of understanding  - Provide teaching via preferred learning methods  Outcome: Progressing     Problem: Potential for Falls  Goal: Patient will remain free of falls  Description: INTERVENTIONS:  - Educate patient/family on patient safety including physical limitations  - Instruct patient to call for assistance with activity   - Consult OT/PT to assist with strengthening/mobility   - Keep Call bell within reach  - Keep bed low and locked with side rails adjusted as appropriate  - Keep care items and personal belongings within reach  - Initiate and maintain comfort rounds  - Make Fall Risk Sign visible to staff  - Apply yellow socks and bracelet for high fall risk patients  - Consider moving patient to room near nurses station  Outcome: Progressing     Problem: Nutrition/Hydration-ADULT  Goal: Nutrient/Hydration intake appropriate for improving, restoring or maintaining nutritional needs  Description: Monitor and assess patient's nutrition/hydration status for malnutrition  Collaborate with interdisciplinary team and initiate plan and interventions as ordered  Monitor patient's weight and dietary intake as ordered or per policy  Utilize nutrition screening tool and intervene as necessary  Determine patient's food preferences and provide high-protein, high-caloric foods as appropriate       INTERVENTIONS:  - Monitor oral intake, urinary output, labs, and treatment plans  - Assess nutrition and hydration status and recommend course of action  - Evaluate amount of meals eaten  - Assist patient with eating if necessary   - Allow adequate time for meals  - Recommend/ encourage appropriate diets, oral nutritional supplements, and vitamin/mineral supplements  - Order, calculate, and assess calorie counts as needed  - Recommend, monitor, and adjust tube feedings and TPN/PPN based on assessed needs  - Assess need for intravenous fluids  - Provide specific nutrition/hydration education as appropriate  - Include patient/family/caregiver in decisions related to nutrition  Outcome: Progressing

## 2022-08-09 NOTE — ASSESSMENT & PLAN NOTE
Patient has chronic atrial fibrillation    He was on Toprol and warfarin  He developed bradycardia with heart rates in the 20s during the hospital stay and his Metoprolol was discontinued  Patient's heart rate was as low as 25 during sleep for a brief period of time    Continue telemetry monitoring, holding metoprolol      INR 3 53 today, will hold warfarin today

## 2022-08-09 NOTE — ASSESSMENT & PLAN NOTE
Patient was admitted with acute kidney injury on stage IV chronic kidney disease with baseline creatinine of 1 8-2 0 likely due to ATN  He was treated with IV fluids nephrology was following the patient    Patient received IV Lasix as well    Renal function is improving, creatinine decreased to 3 3 today after resuming torsemide yesterday    Continue [place] catheter, continue monitor renal function

## 2022-08-09 NOTE — PROGRESS NOTES
NEPHROLOGY PROGRESS NOTE   Delisa Day 73 y o  male MRN: [id number]  Unit/Bed#: -01 SDU Encounter: [id number]      ASSESSMENT/PLAN:  1  Acute kidney injury, POA:  Suspect severe prerenal azotemia with nausea, vomiting and diarrhea +/-progression to ATN  · Creatinine peaked at 5 78 and is now improving down to 3 3 today  · CT:  No hydronephrosis  · UA:  1-2 RBCs, 1-2 wbc's  · Bladder scan insignificant  · Initially on IV fluids but now back on home diuretic, torsemide 40 mg daily  2  CKD stage 4:  Baseline creatinine 1 8-2 2 and follows with Dr Rebeca Dahl   3  Systolic CHF with EF 90%:  Continue torsemide 40 mg daily  4  Hypokalemia:  Potassium 3 2  · Started on K-Dur 20 mEq p o  B i d   · Discontinue low-potassium diet  5  Anemia:  Hemoglobin 8 1 and stable  Status post transfusion earlier in the admission  6  Acute diverticulitis:  Now on p o  Keflex and Flagyl  7  Multiple myeloma  8  AFib  9  COPD/NALLELY    Plan Summary:    Continue torsemide 40 mg daily   Replace potassium   Check a m  BMP    SUBJECTIVE:  Still having shortness of breath which he feels is unchanged  Reports he did have diarrhea again this morning      OBJECTIVE:  Current Weight: Weight - Scale: (!) 139 kg (305 lb 8 9 oz)  Vitals:    08/09/22 1114   BP: 160/68   Pulse: 58   Resp: 18   Temp: 97 9 °F (36 6 °C)   SpO2: 98%       Intake/Output Summary (Last 24 hours) at 8/9/2022 1126  Last data filed at 8/9/2022 5522  Gross per 24 hour   Intake 1220 ml   Output 2125 ml   Net -905 ml       General:  appears comfortable and in no acute distress   Skin:  No rash, warm, good skin turgor   Eyes:  Sclerae anicteric, no periorbital edema   ENT:  Moist mucous membranes  Neck:  Trachea midline, symmetric   Chest:  Clear to auscultation bilaterally with no wheezes, rales or rhonchi  CVS: irregularly irregular   Abdomen:  Soft, nontender, nondistended  Neuro:  Awake and alert  Psych:  Appropriate affect  Extremities: L AKA   : mehta in place with clear urine ouput       Medications:  Scheduled Meds:  Current Facility-Administered Medications   Medication Dose Route Frequency Provider Last Rate    acetaminophen  650 mg Oral Q6H PRN Jeff Monday, MD      atropine  0 4 mg Intravenous Once NICOLA Márquez      cephalexin  500 mg Oral TID Ed Berrios MD      cholestyramine sugar free  4 g Oral BID Jeff Monday, MD      fluticasone  1 spray Each Nare BID Julia Parr PA-C      fluticasone-vilanterol  1 puff Inhalation Daily Julia Parr PA-C      insulin lispro  1-5 Units Subcutaneous HS Jesica Santana PA-C      insulin lispro  2-12 Units Subcutaneous TID AC NICOLA Morales      loperamide  2 mg Oral Q2H PRN Kimberli FOURNIER PA-C      metroNIDAZOLE  500 mg Oral Q8H Miguel A Tracy MD      ondansetron  4 mg Intravenous Q6H PRN Jeff Monday, MD      pantoprazole  40 mg Intravenous Q24H [facility] Julia Parr PA-C      potassium chloride  20 mEq Oral BID Ed Berrios MD      torsemide  40 mg Oral Daily Ed Berrios MD         PRN Meds:   acetaminophen    loperamide    ondansetron    Laboratory Results:  Results from last 7 days   Lab Units 08/09/22  0529 08/08/22  0519 08/07/22  2018 08/07/22  1553 08/07/22  0540 08/06/22  1618 08/06/22  0834 08/06/22  0357 08/05/22  0905 08/05/22  0455 08/04/22  1401 08/04/22  0546 08/03/22  1443 08/03/22  0512   WBC Thousand/uL 6 89 6 59  --   --  6 28  --   --  5 94  --  5 86  --  7 94  --  9 29   HEMOGLOBIN g/dL 8 1* 8 9* 6 2*  --  7 2* 7 7* 7 7* 7 5*   < > 6 9*   < > 7 1*  7 1*   < > 7 3*   HEMATOCRIT % 27 5* 29 5* 20 3*  --  23 5* 24 7* 24 4* 23 5*   < > 22 4*   < > 21 8*  22 1*   < > 22 8*   PLATELETS Thousands/uL 347 313  --   --  315  --   --  233  --  209  --  169  --  165   SODIUM mmol/L 144 143  --  141 146 142  --  144  --  143  --  145   < > 148*   POTASSIUM mmol/L 3 2* 3 8  --  3 5 3 8 3 3*  --  3 3*  --  3 6  --  3 5   < > 4 1   CHLORIDE mmol/L 110* 110*  --  110* 110* 108  --  108  --  107  --  109*   < > 112*   CO2 mmol/L 21 19*  --  21 24 24  --  24  --  24  --  26   < > 18*   BUN mg/dL 48* 53*  --  59* 60* 63*  --  69*  --  70*  --  75*   < > 82*   CREATININE mg/dL 3 30* 3 40*  --  3 79* 3 96* 4 07*  --  4 27*  --  4 38*  --  4 72*   < > 5 02*   CALCIUM mg/dL 8 0* 8 3  --  8 2* 8 3 8 3  --  8 1*  --  8 1*  --  8 2*   < > 8 1*   MAGNESIUM mg/dL  --   --   --  1 8  --  1 9  --  1 9  --   --   --   --   --  1 9   PHOSPHORUS mg/dL  --   --   --   --  4 5*  --   --  5 4*  --   --   --  4 8*  --   --     < > = values in this interval not displayed          r

## 2022-08-09 NOTE — ASSESSMENT & PLAN NOTE
Patient has history of anemia in setting of CKD/multiple myeloma  He had no signs of GI or  bleeding  He required blood transfusion for the anemia  Hemoglobin is 8 1 today      Patient had EGD and colonoscopy this year    Will monitor anemia closely

## 2022-08-09 NOTE — PROGRESS NOTES
Progress Note - Cardiology   Nate Grit 73 y o  male MRN: [id number]  Unit/Bed#: -01 SDU Encounter: [id number]        Problem List:  Principal Problem:    Acute diverticulitis  Active Problems:    Essential hypertension    Chronic atrial fibrillation (HCC)    Morbid obesity (HCC)    Acute on chronic combined systolic and diastolic congestive heart failure (HCC)    GERD (gastroesophageal reflux disease)    NALLELY (obstructive sleep apnea)    DORA (acute kidney injury) ([facility] )    Multiple myeloma (HCC)    Chronic obstructive pulmonary disease, unspecified (HCC)    Ambulatory dysfunction    Anemia    Stage 3b chronic kidney disease (HCC)    Hyperkalemia      Asessment:  1  Bradycardia- reason for consultation  a  Tele 8-7 overnight into 8-8 showing AFib with slow ventricular response, heart rate in the 40s and 50s  b  6 2nd pause at 2225 on 08/07/2022  2  Nausea/vomiting, Acute diverticulitis (reason for presentation)  a  He has been seen by GI and surgery and this was managed with IV antibiotics, no procedures needed  3  Acute on chronic kidney disease  a  Creatinine on arrival 5 78, currently trending around 3 5  4  Chronic atrial fibrillation  a  On warfarin for CVA risk reduction  5  Cardiomyopathy, unspecified type  a  Echocardiograms from 2015, 2016, in 2019 showing EF 55-60% with grade 2 diastolic dysfunction  b  Echo 10/05/2021:  EF 45% with regional wall motion abnormalities, mild MR  c  Nuclear stress test 10/15/2021:  Nondiagnostic EKG, small fixed basal to inferior defect suspect due to diaphragmatic attenuation with no definitive evidence of ischemia, EF read as 36%  6  Chronic combined systolic and diastolic heart failure  a  OP diuretic torsemide 40 twice daily  b  Previous outpatient weight 126 kg at nephrology office May 2022  7  Hypertension  8  Obstructive sleep apnea  9  Dyslipidemia  10  Take 2 diabetes  11  Multiple myeloma  a   Seen by Heme-Onc July 2022 noting plan for repeat blood work to re-evaluate his myeloma  b  Receiving weekly Chemo therapy  12  Acute on chronic anemia requiring 1 unit packed red blood cells 08/05/2022  13  Peripheral vascular disease, history of left AKA      Plan/ Discussion:   Tele reviewed which shows a-fib with slow ventricular response overnight and this AM  Occasionally his heart rate will go into the 30s or 40s when he is sleeping  His blood pressure is stable  The longest R to R interval was about 2 6 seconds  His bradycardia appears to occur almost exclusively when he is sleeping  When he woke up today's heart rate improved to the 60's and 70's   Suspect the above is related to obstructive sleep apnea     Continue to hold metoprolol  At this juncture no indication for PPM but should he have pauses or heart block while awake & off AVN blocking agents we would need to revisit the possibility  If tachycardia also becomes an issue he may need a pacemaker to allow for rate control medications     Continue to hold warfarin (INR 3 53 today)    Subjective:  Feeling tired today but otherwise no physical complaints      Vitals:  Vitals:    08/08/22 0600 08/09/22 0533   Weight: (!) 140 kg (309 lb 8 4 oz) (!) 139 kg (305 lb 8 9 oz)   ,  Vitals:    08/09/22 0000 08/09/22 0400 08/09/22 0533 08/09/22 0726   BP: 165/72 122/60  142/67   BP Location:    Left arm   Pulse: 65 64  58   Resp: (!) 23 (!) 23  19   Temp:    98 7 °F (37 1 °C)   TempSrc:    Oral   SpO2: 94% 98%  99%   Weight:   (!) 139 kg (305 lb 8 9 oz)    Height:           Exam:  General: Alert awake and oriented, no acute distress   Sleeping comfortably when I entered the room but woke up quickly   Telemetry low 40s while sleeping   Heart rate increased to 6 he is in 70s, sinus he woke up  Heart:  Regular rate and rhythm, no murmurs, Normal S1, no edema    Respiratory effort/ Lungs:  Breathing comfortably on room air, clear bilaterally without wheezing, rales, crackles   Abdominal: Non-tender to palpation, + bowel sounds, soft, no masses or distension  Lower Limbs:  No edema            Telemetry:        Telemetry low 40s while sleeping   Heart rate increased to 60s and 70s on assistant as he woke up    Medications:    Current Facility-Administered Medications:     acetaminophen (TYLENOL) tablet 650 mg, 650 mg, Oral, Q6H PRN, Riaz Francois MD, 650 mg at 22 2253    Atropine Sulfate injection 0 4 mg, 0 4 mg, Intravenous, Once, NICOLA Canseco    cephalexin (KEFLEX) capsule 500 mg, 500 mg, Oral, TID, Vee Fraser MD, 500 mg at 22 0840    cholestyramine sugar free (QUESTRAN LIGHT) packet 4 g, 4 g, Oral, BID, Riaz Francois MD, 4 g at 22 0839    fluticasone (FLONASE) 50 mcg/act nasal spray 1 spray, 1 spray, Each Nare, BID, Julia Parr PA-C, 1 spray at 22 0838    fluticasone-vilanterol (BREO ELLIPTA) 200-25 MCG/INH inhaler 1 puff, 1 puff, Inhalation, Daily, Julia Parr PA-C, 1 puff at 22 0838    insulin lispro (HumaLOG) 100 units/mL subcutaneous injection 1-5 Units, 1-5 Units, Subcutaneous, HS, Jesica Santana PA-C, 3 Units at 22 2111    insulin lispro (HumaLOG) 100 units/mL subcutaneous injection 2-12 Units, 2-12 Units, Subcutaneous, TID AC, 10 Units at 22 1625 **AND** Fingerstick Glucose (POCT), , , 4x Daily AC and at bedtime, NICOLA Morales    loperamide (IMODIUM) capsule 2 mg, 2 mg, Oral, Q2H PRN, Kimberli FOURNIER PA-C, 2 mg at 22 0547    metroNIDAZOLE (FLAGYL) tablet 500 mg, 500 mg, Oral, Q8H [facility], Vee Fraser MD, 500 mg at 22 0840    ondansetron (ZOFRAN) injection 4 mg, 4 mg, Intravenous, Q6H PRN, Riaz Francois MD    pantoprazole (PROTONIX) injection 40 mg, 40 mg, Intravenous, Q24H JULIEN, Julia Parr PA-C, 40 mg at 22 0839    potassium chloride (K-DUR,KLOR-CON) CR tablet 20 mEq, 20 mEq, Oral, BID, Vee Fraser MD, 20 mEq at 22 0840    torsemide (DEMADEX) tablet 40 mg, 40 mg, Oral, Daily, [facility] Apgar MD Lalita, 40 mg at 08/09/22 0840      Labs/Data:        Results from last 7 days   Lab Units 08/09/22  0529 08/08/22  0519 08/07/22 2018 08/07/22  0540   WBC Thousand/uL 6 89 6 59  --  6 28   HEMOGLOBIN g/dL 8 1* 8 9* 6 2* 7 2*   HEMATOCRIT % 27 5* 29 5* 20 3* 23 5*   PLATELETS Thousands/uL 347 313  --  315     Results from last 7 days   Lab Units 08/09/22  0529 08/08/22  0519 08/07/22  1553   POTASSIUM mmol/L 3 2* 3 8 3 5   CHLORIDE mmol/L 110* 110* 110*   CO2 mmol/L 21 19* 21   BUN mg/dL 48* 53* 59*

## 2022-08-10 VITALS
HEART RATE: 66 BPM | RESPIRATION RATE: 20 BRPM | WEIGHT: 300.05 LBS | HEIGHT: 72 IN | OXYGEN SATURATION: 94 % | BODY MASS INDEX: 40.64 KG/M2 | TEMPERATURE: 98.4 F | SYSTOLIC BLOOD PRESSURE: 121 MMHG | DIASTOLIC BLOOD PRESSURE: 59 MMHG

## 2022-08-10 LAB
ANION GAP SERPL CALCULATED.3IONS-SCNC: 11 MMOL/L (ref 4–13)
BUN SERPL-MCNC: 44 MG/DL (ref 5–25)
CALCIUM SERPL-MCNC: 8.3 MG/DL (ref 8.3–10.1)
CHLORIDE SERPL-SCNC: 111 MMOL/L (ref 96–108)
CO2 SERPL-SCNC: 22 MMOL/L (ref 21–32)
CREAT SERPL-MCNC: 2.96 MG/DL (ref 0.6–1.3)
ERYTHROCYTE [DISTWIDTH] IN BLOOD BY AUTOMATED COUNT: 19.9 % (ref 11.6–15.1)
FLUAV RNA RESP QL NAA+PROBE: NEGATIVE
FLUBV RNA RESP QL NAA+PROBE: NEGATIVE
GFR SERPL CREATININE-BSD FRML MDRD: 20 ML/MIN/1.73SQ M
GLUCOSE SERPL-MCNC: 122 MG/DL (ref 65–140)
GLUCOSE SERPL-MCNC: 137 MG/DL (ref 65–140)
GLUCOSE SERPL-MCNC: 147 MG/DL (ref 65–140)
GLUCOSE SERPL-MCNC: 163 MG/DL (ref 65–140)
HCT VFR BLD AUTO: 28.9 % (ref 36.5–49.3)
HGB BLD-MCNC: 8.6 G/DL (ref 12–17)
INR PPP: 3.39 (ref 0.84–1.19)
MCH RBC QN AUTO: 26.3 PG (ref 26.8–34.3)
MCHC RBC AUTO-ENTMCNC: 29.8 G/DL (ref 31.4–37.4)
MCV RBC AUTO: 88 FL (ref 82–98)
PLATELET # BLD AUTO: 361 THOUSANDS/UL (ref 149–390)
PMV BLD AUTO: 10 FL (ref 8.9–12.7)
POTASSIUM SERPL-SCNC: 3.5 MMOL/L (ref 3.5–5.3)
PROTHROMBIN TIME: 35.8 SECONDS (ref 11.6–14.5)
RBC # BLD AUTO: 3.27 MILLION/UL (ref 3.88–5.62)
RSV RNA RESP QL NAA+PROBE: NEGATIVE
SARS-COV-2 RNA RESP QL NAA+PROBE: NEGATIVE
SODIUM SERPL-SCNC: 144 MMOL/L (ref 135–147)
WBC # BLD AUTO: 8.22 THOUSAND/UL (ref 4.31–10.16)

## 2022-08-10 PROCEDURE — 85027 COMPLETE CBC AUTOMATED: CPT | Performed by: INTERNAL MEDICINE

## 2022-08-10 PROCEDURE — 0241U HB NFCT DS VIR RESP RNA 4 TRGT: CPT | Performed by: INTERNAL MEDICINE

## 2022-08-10 PROCEDURE — 85610 PROTHROMBIN TIME: CPT | Performed by: INTERNAL MEDICINE

## 2022-08-10 PROCEDURE — 80048 BASIC METABOLIC PNL TOTAL CA: CPT | Performed by: INTERNAL MEDICINE

## 2022-08-10 PROCEDURE — 99232 SBSQ HOSP IP/OBS MODERATE 35: CPT | Performed by: INTERNAL MEDICINE

## 2022-08-10 PROCEDURE — 99239 HOSP IP/OBS DSCHRG MGMT >30: CPT | Performed by: INTERNAL MEDICINE

## 2022-08-10 PROCEDURE — 82948 REAGENT STRIP/BLOOD GLUCOSE: CPT

## 2022-08-10 PROCEDURE — 94760 N-INVAS EAR/PLS OXIMETRY 1: CPT

## 2022-08-10 PROCEDURE — C9113 INJ PANTOPRAZOLE SODIUM, VIA: HCPCS | Performed by: PHYSICIAN ASSISTANT

## 2022-08-10 RX ORDER — INSULIN GLARGINE 100 [IU]/ML
10 INJECTION, SOLUTION SUBCUTANEOUS
Qty: 15 ML | Refills: 0
Start: 2022-08-10 | End: 2022-10-04

## 2022-08-10 RX ORDER — METRONIDAZOLE 500 MG/1
500 TABLET ORAL EVERY 8 HOURS SCHEDULED
Qty: 3 TABLET | Refills: 0
Start: 2022-08-10 | End: 2022-08-11

## 2022-08-10 RX ORDER — CEPHALEXIN 500 MG/1
500 CAPSULE ORAL 3 TIMES DAILY
Qty: 3 CAPSULE | Refills: 0
Start: 2022-08-10 | End: 2022-08-11

## 2022-08-10 RX ORDER — CHOLESTYRAMINE LIGHT 4 G/5.7G
4 POWDER, FOR SUSPENSION ORAL 2 TIMES DAILY
Qty: 30 PACKET | Refills: 0
Start: 2022-08-10

## 2022-08-10 RX ADMIN — CHOLESTYRAMINE 4 G: 4 POWDER, FOR SUSPENSION ORAL at 17:34

## 2022-08-10 RX ADMIN — PANTOPRAZOLE SODIUM 40 MG: 40 INJECTION, POWDER, FOR SOLUTION INTRAVENOUS at 09:49

## 2022-08-10 RX ADMIN — FLUTICASONE PROPIONATE 1 SPRAY: 50 SPRAY, METERED NASAL at 09:50

## 2022-08-10 RX ADMIN — METRONIDAZOLE 500 MG: 500 TABLET ORAL at 05:00

## 2022-08-10 RX ADMIN — FLUTICASONE FUROATE AND VILANTEROL TRIFENATATE 1 PUFF: 200; 25 POWDER RESPIRATORY (INHALATION) at 09:50

## 2022-08-10 RX ADMIN — INSULIN LISPRO 2 UNITS: 100 INJECTION, SOLUTION INTRAVENOUS; SUBCUTANEOUS at 11:45

## 2022-08-10 RX ADMIN — CEPHALEXIN 500 MG: 250 CAPSULE ORAL at 09:49

## 2022-08-10 RX ADMIN — TORSEMIDE 40 MG: 20 TABLET ORAL at 09:49

## 2022-08-10 RX ADMIN — CHOLESTYRAMINE 4 G: 4 POWDER, FOR SUSPENSION ORAL at 09:49

## 2022-08-10 RX ADMIN — FLUTICASONE PROPIONATE 1 SPRAY: 50 SPRAY, METERED NASAL at 17:35

## 2022-08-10 RX ADMIN — POTASSIUM CHLORIDE 20 MEQ: 1500 TABLET, EXTENDED RELEASE ORAL at 17:34

## 2022-08-10 RX ADMIN — CEPHALEXIN 500 MG: 250 CAPSULE ORAL at 16:25

## 2022-08-10 RX ADMIN — POTASSIUM CHLORIDE 20 MEQ: 1500 TABLET, EXTENDED RELEASE ORAL at 09:50

## 2022-08-10 RX ADMIN — METRONIDAZOLE 500 MG: 500 TABLET ORAL at 13:30

## 2022-08-10 NOTE — ASSESSMENT & PLAN NOTE
Patient has chronic atrial fibrillation    He was on Toprol and warfarin prior to admission  He developed bradycardia in the hospital with heart rates in the 20s during the hospital stay that was treated with IV atropine x1 and discontinuing metoprolol  His heart rates remain well controlled without metoprolol  His INR was 3 53 on August 9th and 3 39 on day of discharge  Warfarin has been on hold    INR should be checked tomorrow in the nursing home    Patient had no evidence of GI or  bleeding

## 2022-08-10 NOTE — DISCHARGE SUMMARY
[facility]  Discharge- Tanesha Rosario 1950, 73 y o  male MRN: [id number]  Unit/Bed#: -01 SDU Encounter: [id number]  Primary Care Provider: Loki Cotto MD   Date and time admitted to hospital: 8/1/2022  8:27 PM    * Acute diverticulitis  Assessment & Plan  Patient was admitted with nausea, vomiting, diarrhea due to acute sigmoid diverticulitis  Patient was treated with IV fluids, IV Rocephin Flagyl  Patient claims that he has chronic left-sided abdominal pain which is about the same on day of discharge as before hospital admission  He has mild left abdominal tenderness without guarding on day of discharge  He tolerates diet without nausea abdominal pain  He will complete 1 more day of p o  Keflex and Flagyl therapy as an outpatient    Patient is stable for discharge today    I left message on son's voicemail on August 10th notifying him of discharge      DORA (acute kidney injury) [facility])  Assessment & Plan  Patient was admitted with acute kidney injury on stage IV chronic kidney disease with baseline creatinine of 1 8-2 0 likely due to ATN  He was initially treated with IV fluids and nephrology was following the patient  Montague catheter was inserted to monitor urine output  Renal function continues to improve:  Creatinine decreased to 2 96 on day of discharge  I discontinued Montague catheter and patient was able to void without difficulty  Had 60 cc of postvoid residual by bladder scan    I discussed the case with Cardiology  He will follow-up with nephrology as an outpatient    Acute on chronic combined systolic and diastolic congestive heart failure [facility])  Assessment & Plan  Wt Readings from Last 3 Encounters:   08/10/22 (!) 136 kg (300 lb 0 7 oz)   07/26/22 131 kg (288 lb 5 8 oz)   07/19/22 127 kg (281 lb 1 4 oz)       Patient has chronic systolic CHF with ejection fraction of 45% in October last year      He had fluid overload from IV fluids that he received for acute sigmoid diverticulitis and acute kidney injury  He was treated with IV Lasix subsequently    He denies shortness of breath today  Lungs are clear to auscultation without wheezing or crackles  Torsemide is resumed on discharge    Discussed the case with Cardiology          Chronic atrial fibrillation [facility])  Assessment & Plan  Patient has chronic atrial fibrillation    He was on Toprol and warfarin prior to admission  He developed bradycardia in the hospital with heart rates in the 20s during the hospital stay that was treated with IV atropine x1 and discontinuing metoprolol  His heart rates remain well controlled without metoprolol  His INR was 3 53 on August 9th and 3 39 on day of discharge  Warfarin has been on hold  INR should be checked tomorrow in the nursing home    Patient had no evidence of GI or  bleeding    Anemia  Assessment & Plan  Patient has history of anemia in setting of CKD/multiple myeloma  He had no signs of GI or  bleeding  He required blood transfusion for the anemia  Hemoglobin is 8 6 on discharge    Patient had EGD and colonoscopy this year        Morbid obesity ([facility] )  Assessment & Plan  BMI is 40 69  Patient has morbid obesity  Encourage weight loss and lifestyle modification    Diabetes, polyneuropathy [facility])  Assessment & Plan  Lab Results   Component Value Date    HGBA1C 6 3 (H) 08/05/2022       Recent Labs     08/09/22  2041 08/10/22  0810 08/10/22  1123 08/10/22  1605   POCGLU 187* 137 163* 147*       Blood Sugar Average: Last 72 hrs:  (P) 207 4375     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    I am discharging patient with reduced dose of Lantus, he will continue Victoza  I discontinue metformin on discharge due to his renal function    Chronic obstructive pulmonary disease, unspecified ([facility] )  Assessment & Plan  Patient has chronic COPD  He had no COPD exacerbation during hospital stay     His lungs are clear to auscultation on day of discharge without wheezing or crackles  He will continue Advair and albuterol            Hospital Course:     Reji Savage is a 73 y o  male patient who originally presented to the hospital on   Admission Orders (From admission, onward)     Ordered        08/01/22 2305  INPATIENT ADMISSION  Once                     due to acute diverticulitis, acute kidney injury    Please see above list of diagnoses and related plan for additional information  Condition at Discharge:  good      Discharge instructions/Information to patient and family:   See after visit summary for information provided to patient and family  Provisions for Follow-Up Care:  See after visit summary for information related to follow-up care and any pertinent home health orders  Disposition:     Other [facility]       Discharge Statement:  I spent 40 minutes discharging the patient  This time was spent on the day of discharge  I had direct contact with the patient on the day of discharge  Greater than 50% of the total time was spent examining patient, answering all patient questions, arranging and discussing plan of care with patient as well as directly providing post-discharge instructions  Additional time then spent on discharge activities  Discharge Medications:  See after visit summary for reconciled discharge medications provided to patient and family        ** Please Note: This note has been constructed using a voice recognition system **

## 2022-08-10 NOTE — ASSESSMENT & PLAN NOTE
Patient has chronic COPD  He had no COPD exacerbation during hospital stay  His lungs are clear to auscultation on day of discharge without wheezing or crackles    He will continue Advair and albuterol

## 2022-08-10 NOTE — ASSESSMENT & PLAN NOTE
Patient was admitted with acute kidney injury on stage IV chronic kidney disease with baseline creatinine of 1 8-2 0 likely due to ATN  He was initially treated with IV fluids and nephrology was following the patient  Montague catheter was inserted to monitor urine output  Renal function continues to improve:  Creatinine decreased to 2 96 on day of discharge  I discontinued Montague catheter and patient was able to void without difficulty    Had 60 cc of postvoid residual by bladder scan    I discussed the case with Cardiology  He will follow-up with nephrology as an outpatient

## 2022-08-10 NOTE — ASSESSMENT & PLAN NOTE
Wt Readings from Last 3 Encounters:   08/10/22 (!) 136 kg (300 lb 0 7 oz)   07/26/22 131 kg (288 lb 5 8 oz)   07/19/22 127 kg (281 lb 1 4 oz)       Patient has chronic systolic CHF with ejection fraction of 45% in October last year  He had fluid overload from IV fluids that he received for acute sigmoid diverticulitis and acute kidney injury  He was treated with IV Lasix subsequently    He denies shortness of breath today  Lungs are clear to auscultation without wheezing or crackles        Torsemide is resumed on discharge    Discussed the case with Cardiology

## 2022-08-10 NOTE — ASSESSMENT & PLAN NOTE
Patient was admitted with nausea, vomiting, diarrhea due to acute sigmoid diverticulitis  Patient was treated with IV fluids, IV Rocephin Flagyl  Patient claims that he has chronic left-sided abdominal pain which is about the same on day of discharge as before hospital admission  He has mild left abdominal tenderness without guarding on day of discharge  He tolerates diet without nausea abdominal pain  He will complete 1 more day of p o   Keflex and Flagyl therapy as an outpatient    Patient is stable for discharge today    I left message on son's voicemail on August 10th notifying him of discharge

## 2022-08-10 NOTE — CASE MANAGEMENT
Case Management Discharge Planning Note    Patient name Umesh Boyer  Location  SDU/-01 S* MRN [id number]  : 1950 Date 8/10/2022       Current Admission Date: 2022  Current Admission Diagnosis:Acute diverticulitis   Patient Active Problem List    Diagnosis Date Noted    Acute diverticulitis 2022    Stage 3b chronic kidney disease ([facility] ) 2022    Anemia 10/04/2021    Supratherapeutic INR 10/04/2021    Traumatic skin ulcer ([facility] ) 2021    Ambulatory dysfunction 2021    Venous hypertension, chronic, with ulcer and inflammation, right ([facility] ) 2020    Need for influenza vaccination 2020    Fall from bed 2020    Sepsis due to COVID-19 pneumonia ([facility] ) 2020    Difficulty in walking, not elsewhere classified 2020    Venous insufficiency (chronic) (peripheral) 2020    Rash 2019    Lymphedema 2019    Gout 2019    Mass of psoas muscle 2019    CKD (chronic kidney disease) 2019    Hiatal hernia 10/29/2019    Weakness 10/29/2019    Type 2 acute myocardial infarction ([facility] ) 10/29/2019    Above knee amputation of left lower extremity ([facility] ) 2019    Multiple myeloma ([facility] ) 2019    Chronic obstructive pulmonary disease, unspecified ([facility] ) 2019    Hypertensive chronic kidney disease w stg 1-4/unsp chr kdny 2019    DORA (acute kidney injury) ([facility] ) 2019    SOB (shortness of breath) 2019    NALLELY (obstructive sleep apnea) 2019    Moderate persistent asthma without complication 1950    Peripheral vascular disease ([facility] ) 2019    Localized edema 2018    Acute on chronic combined systolic and diastolic congestive heart failure ([facility] ) 10/11/2017    Chronic atrial fibrillation ([facility] ) 10/09/2017    Morbid obesity ([facility] ) 10/09/2017    Gallstones 2017    Diabetic foot ulcer ([facility] ) 09/15/2016    Diabetes mellitus type 2, uncontrolled 09/15/2016    Chronic venous hypertension, right 09/15/2016    Essential hypertension 09/15/2016    Cardiomyopathy ([facility] ) 02/17/2016    Onychomycosis 10/27/2015    Diabetes, polyneuropathy ([facility] ) 08/21/2014    GERD (gastroesophageal reflux disease) 07/24/2014    Hyperlipidemia 07/24/2014      LOS (days): 9  Geometric Mean LOS (GMLOS) (days): 2 60  Days to GMLOS:-6     OBJECTIVE:  Risk of Unplanned Readmission Score: 35 11         Current admission status: Inpatient   Preferred Pharmacy:   [address] - [address] [place] [address]  [address] [facility] [address]  Phone: [phone] Fax: [phone]    Primary Care Provider: Bjorn Medina MD    Primary Insurance: [facility]  Secondary Insurance:     DISCHARGE DETAILS:    Discharge planning discussed with[de-identified] Left message for Jessy templeton           Were Treatment Team discharge recommendations reviewed with patient/caregiver?: Yes (Left message for Christi templeton re: DCP-return to [facility] today  Await BLS transport time)       Treatment Team Recommendation: SNF  Discharge Destination Plan[de-identified] SNF  Transport at Discharge : [facility]  Dispatcher Contacted: Yes  Number/Name of Dispatcher: referral sent via Round trip     ETA of Transport (Date): 08/10/22        Transfer Mode: Stretcher  Accompanied by: Alone     IMM Given (Date):: 08/10/22  IMM Given to[de-identified] Family     **addendum: pt will be transported to [facility] via BLS transport at [facility] with [facility]  S/w Jessy templeton via phone to confirm same    CM support requested auth for transport from [facility]

## 2022-08-10 NOTE — ASSESSMENT & PLAN NOTE
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

## 2022-08-10 NOTE — ASSESSMENT & PLAN NOTE
Patient has history of anemia in setting of CKD/multiple myeloma  He had no signs of GI or  bleeding  He required blood transfusion for the anemia    Hemoglobin is 8 6 on discharge    Patient had EGD and colonoscopy this year

## 2022-08-10 NOTE — PLAN OF CARE
Problem: MOBILITY - ADULT  Goal: Maintain or return to baseline ADL function  Description: INTERVENTIONS:  -  Assess patient's ability to carry out ADLs; assess patient's baseline for ADL function and identify physical deficits which impact ability to perform ADLs (bathing, care of mouth/teeth, toileting, grooming, dressing, etc )  - Assess/evaluate cause of self-care deficits   - Assess range of motion  - Assess patient's mobility; develop plan if impaired  - Assess patient's need for assistive devices and provide as appropriate  - Encourage maximum independence but intervene and supervise when necessary  - Involve family in performance of ADLs  - Assess for home care needs following discharge   - Consider OT consult to assist with ADL evaluation and planning for discharge  - Provide patient education as appropriate  Outcome: Progressing  Goal: Maintains/Returns to pre admission functional level  Description: INTERVENTIONS:  - Perform BMAT or MOVE assessment daily    - Set and communicate daily mobility goal to care team and patient/family/caregiver  - Collaborate with rehabilitation services on mobility goals if consulted  - Perform Range of Motion 4 times a day  - Reposition patient every 2 hours    - Dangle patient 3 times a day  - Stand patient 3 times a day  - Record patient progress and toleration of activity level   Outcome: Progressing     Problem: Prexisting or High Potential for Compromised Skin Integrity  Goal: Skin integrity is maintained or improved  Description: INTERVENTIONS:  - Identify patients at risk for skin breakdown  - Assess and monitor skin integrity  - Assess and monitor nutrition and hydration status  - Monitor labs   - Assess for incontinence   - Turn and reposition patient  - Assist with mobility/ambulation  - Relieve pressure over bony prominences  - Avoid friction and shearing  - Provide appropriate hygiene as needed including keeping skin clean and dry  - Evaluate need for skin moisturizer/barrier cream  - Collaborate with interdisciplinary team   - Patient/family teaching  - Consider wound care consult   Outcome: Progressing     Problem: PAIN - ADULT  Goal: Verbalizes/displays adequate comfort level or baseline comfort level  Description: Interventions:  - Encourage patient to monitor pain and request assistance  - Assess pain using appropriate pain scale  - Administer analgesics based on type and severity of pain and evaluate response  - Implement non-pharmacological measures as appropriate and evaluate response  - Consider cultural and social influences on pain and pain management  - Notify physician/advanced practitioner if interventions unsuccessful or patient reports new pain  Outcome: Progressing     Problem: INFECTION - ADULT  Goal: Absence or prevention of progression during hospitalization  Description: INTERVENTIONS:  - Assess and monitor for signs and symptoms of infection  - Monitor lab/diagnostic results  - Monitor all insertion sites, i e  indwelling lines, tubes, and drains  - Monitor endotracheal if appropriate and nasal secretions for changes in amount and color  - [place] appropriate cooling/warming therapies per order  - Administer medications as ordered  - Instruct and encourage patient and family to use good hand hygiene technique  - Identify and instruct in appropriate isolation precautions for identified infection/condition  Outcome: Progressing  Goal: Absence of fever/infection during neutropenic period  Description: INTERVENTIONS:  - Monitor WBC    Outcome: Progressing     Problem: SAFETY ADULT  Goal: Maintain or return to baseline ADL function  Description: INTERVENTIONS:  -  Assess patient's ability to carry out ADLs; assess patient's baseline for ADL function and identify physical deficits which impact ability to perform ADLs (bathing, care of mouth/teeth, toileting, grooming, dressing, etc )  - Assess/evaluate cause of self-care deficits   - Assess range of motion  - Assess patient's mobility; develop plan if impaired  - Assess patient's need for assistive devices and provide as appropriate  - Encourage maximum independence but intervene and supervise when necessary  - Involve family in performance of ADLs  - Assess for home care needs following discharge   - Consider OT consult to assist with ADL evaluation and planning for discharge  - Provide patient education as appropriate  Outcome: Progressing  Goal: Maintains/Returns to pre admission functional level  Description: INTERVENTIONS:  - Perform BMAT or MOVE assessment daily    - Set and communicate daily mobility goal to care team and patient/family/caregiver  - Collaborate with rehabilitation services on mobility goals if consulted  - Perform Range of Motion 4 times a day  - Reposition patient every 2 hours    - Dangle patient 3 times a day  - Record patient progress and toleration of activity level   Outcome: Progressing  Goal: Patient will remain free of falls  Description: INTERVENTIONS:  - Educate patient/family on patient safety including physical limitations  - Instruct patient to call for assistance with activity   - Consult OT/PT to assist with strengthening/mobility   - Keep Call bell within reach  - Keep bed low and locked with side rails adjusted as appropriate  - Keep care items and personal belongings within reach  - Initiate and maintain comfort rounds  - Make Fall Risk Sign visible to staff  - Offer Toileting every 2 Hours, in advance of need  - Initiate/Maintain bed alarm  - Obtain necessary fall risk management equipment  - Apply yellow socks and bracelet for high fall risk patients  - Consider moving patient to room near nurses station  Outcome: Progressing     Problem: DISCHARGE PLANNING  Goal: Discharge to home or other facility with appropriate resources  Description: INTERVENTIONS:  - Identify barriers to discharge w/patient and caregiver  - Arrange for needed discharge resources and transportation as appropriate  - Identify discharge learning needs (meds, wound care, etc )  - Arrange for interpretive services to assist at discharge as needed  - Refer to Case Management Department for coordinating discharge planning if the patient needs post-hospital services based on physician/advanced practitioner order or complex needs related to functional status, cognitive ability, or social support system  Outcome: Progressing     Problem: Knowledge Deficit  Goal: Patient/family/caregiver demonstrates understanding of disease process, treatment plan, medications, and discharge instructions  Description: Complete learning assessment and assess knowledge base  Interventions:  - Provide teaching at level of understanding  - Provide teaching via preferred learning methods  Outcome: Progressing     Problem: Potential for Falls  Goal: Patient will remain free of falls  Description: INTERVENTIONS:  - Educate patient/family on patient safety including physical limitations  - Instruct patient to call for assistance with activity   - Consult OT/PT to assist with strengthening/mobility   - Keep Call bell within reach  - Keep bed low and locked with side rails adjusted as appropriate  - Keep care items and personal belongings within reach  - Initiate and maintain comfort rounds  - Make Fall Risk Sign visible to staff  - Offer Toileting every 2 Hours, in advance of need  - Initiate/Maintain bed alarm  - Obtain necessary fall risk management equipment  - Apply yellow socks and bracelet for high fall risk patients  - Consider moving patient to room near nurses station  Outcome: Progressing     Problem: Nutrition/Hydration-ADULT  Goal: Nutrient/Hydration intake appropriate for improving, restoring or maintaining nutritional needs  Description: Monitor and assess patient's nutrition/hydration status for malnutrition  Collaborate with interdisciplinary team and initiate plan and interventions as ordered    Monitor patient's weight and dietary intake as ordered or per policy  Utilize nutrition screening tool and intervene as necessary  Determine patient's food preferences and provide high-protein, high-caloric foods as appropriate       INTERVENTIONS:  - Monitor oral intake, urinary output, labs, and treatment plans  - Assess nutrition and hydration status and recommend course of action  - Evaluate amount of meals eaten  - Assist patient with eating if necessary   - Allow adequate time for meals  - Recommend/ encourage appropriate diets, oral nutritional supplements, and vitamin/mineral supplements  - Order, calculate, and assess calorie counts as needed  - Recommend, monitor, and adjust tube feedings and TPN/PPN based on assessed needs  - Assess need for intravenous fluids  - Provide specific nutrition/hydration education as appropriate  - Include patient/family/caregiver in decisions related to nutrition  Outcome: Progressing

## 2022-08-10 NOTE — PROGRESS NOTES
NEPHROLOGY PROGRESS NOTE   Alex Wong 73 y o  male MRN: [id number]  Unit/Bed#: -01 SDU Encounter: [id number]  Reason for Consult: DORA/CKD    ASSESSMENT/PLAN:  1  Acute Kidney Injury, POA- secondary to prerenal azotemia in the setting of N/V/D  - creatinine peak 5 78 and improving to 2 96 today  - s/p IVF and now on torsemide 40mg daily  - CT without hydronephrosis  - UA: 1-2 rbc, no proteinuria  - mehta catheter in place   - consider void trial prior to discharge (discussed with primary team)  2  Chronic Kidney Disease stage IV- Baseline creatinine is 1 8-2 2  Follows with Dr Chapin Neal  Office message sent for follow up  Repeat BMP in 5 days  3  Systolic CHF with EF 92%- continue torsemide 40mg daily  - home dose prior to admission was torsemide 40mg BID  4  Hypokalemia- improved with kcl supplementation 20meq BID  5  Anemia- s/p transfusion  - iron studies low, recommend IV iron  - if patient not discharged today, please give IV iron inpatient  6  Acute Diverticulitis- on oral antibiotics  - patient complains of continuing to have diarrhea 3x/day  7  Multiple Myeloma / Afib / COPD   8  Bradycardia- cardiology consulted, holding metoprolol    Disposition:  Consider void trial prior to discharge  Okay for discharge from a renal standpoint  BMP next week  Office message sent for follow up  SUBJECTIVE:  Patient feeling well  Has mehta catheter which is new  Denies SOB  Denies LE edema  Eating well      OBJECTIVE:  Current Weight: Weight - Scale: (!) 136 kg (300 lb 0 7 oz)  Vitals:    08/10/22 0315 08/10/22 0400 08/10/22 0547 08/10/22 0814   BP:  118/53     BP Location:  Right arm     Pulse:  61     Resp:  20     Temp:  98 1 °F (36 7 °C)  97 9 °F (36 6 °C)   TempSrc:  Oral  Oral   SpO2: 98% 97%     Weight:   (!) 136 kg (300 lb 0 7 oz)    Height:           Intake/Output Summary (Last 24 hours) at 8/10/2022 0946  Last data filed at 8/10/2022 0501  Gross per 24 hour   Intake 220 ml   Output 1650 ml   Net -1430 ml     General: NAD  Skin: no rash  Eyes: anicteric  ENMT: mm moist  Neck: no masses  Respiratory: CTAB  Cardiac: RRR  Extremities: no edema, left AKA  GI: soft nt nd  Neuro: alert awake  Psych: mood and affect appropriate    Medications:    Current Facility-Administered Medications:     acetaminophen (TYLENOL) tablet 650 mg, 650 mg, Oral, Q6H PRN, Opal Quiroz MD, 650 mg at 08/06/22 2253    Atropine Sulfate injection 0 4 mg, 0 4 mg, Intravenous, Once, NICOLA Álvarez    cephalexin (KEFLEX) capsule 500 mg, 500 mg, Oral, TID, Tova Ruiz MD, 500 mg at 08/09/22 2140    cholestyramine sugar free (QUESTRAN LIGHT) packet 4 g, 4 g, Oral, BID, Opal Quiroz MD, 4 g at 08/09/22 1714    fluticasone (FLONASE) 50 mcg/act nasal spray 1 spray, 1 spray, Each Nare, BID, Julia Parr PA-C, 1 spray at 08/09/22 1715    fluticasone-vilanterol (BREO ELLIPTA) 200-25 MCG/INH inhaler 1 puff, 1 puff, Inhalation, Daily, Julia Parr PA-C, 1 puff at 08/09/22 0838    insulin lispro (HumaLOG) 100 units/mL subcutaneous injection 1-5 Units, 1-5 Units, Subcutaneous, HS, Jesica Santana PA-C, 1 Units at 08/09/22 2140    insulin lispro (HumaLOG) 100 units/mL subcutaneous injection 2-12 Units, 2-12 Units, Subcutaneous, TID AC, 4 Units at 08/09/22 1532 **AND** Fingerstick Glucose (POCT), , , 4x Daily AC and at bedtime, NICOLA Morales    loperamide (IMODIUM) capsule 2 mg, 2 mg, Oral, Q2H PRN, Kimberli FOURNIER PA-C, 2 mg at 08/09/22 1717    metroNIDAZOLE (FLAGYL) tablet 500 mg, 500 mg, Oral, Q8H [facility], Tova Ruiz MD, 500 mg at 08/10/22 0500    ondansetron (ZOFRAN) injection 4 mg, 4 mg, Intravenous, Q6H PRN, Opal Quiroz MD    pantoprazole (PROTONIX) injection 40 mg, 40 mg, Intravenous, Q24H JULIEN, Julia Parr PA-C, 40 mg at 08/09/22 0839    potassium chloride (K-DUR,KLOR-CON) CR tablet 20 mEq, 20 mEq, Oral, BID, oTva Ruiz MD, 20 mEq at 08/09/22 1714    torsemide [facility]) tablet 40 mg, 40 mg, Oral, Daily, Tova Ruiz MD, 40 mg at 08/09/22 0840    Laboratory Results:  Results from last 7 days   Lab Units 08/10/22  0506 08/09/22  0529 08/08/22  0519 08/07/22 2018 08/07/22  1553 08/07/22  0540 08/06/22  1618 08/06/22  0834 08/06/22  0357 08/05/22  0905 08/05/22  0455 08/04/22  1401 08/04/22  0546   WBC Thousand/uL 8 22 6 89 6 59  --   --  6 28  --   --  5 94  --  5 86  --  7 94   HEMOGLOBIN g/dL 8 6* 8 1* 8 9* 6 2*  --  7 2* 7 7* 7 7* 7 5*   < > 6 9*   < > 7 1*  7 1*   HEMATOCRIT % 28 9* 27 5* 29 5* 20 3*  --  23 5* 24 7* 24 4* 23 5*   < > 22 4*   < > 21 8*  22 1*   PLATELETS Thousands/uL 361 347 313  --   --  315  --   --  233  --  209  --  169   POTASSIUM mmol/L 3 5 3 2* 3 8  --  3 5 3 8 3 3*  --  3 3*  --  3 6  --  3 5   CHLORIDE mmol/L 111* 110* 110*  --  110* 110* 108  --  108  --  107  --  109*   CO2 mmol/L 22 21 19*  --  21 24 24  --  24  --  24  --  26   BUN mg/dL 44* 48* 53*  --  59* 60* 63*  --  69*  --  70*  --  75*   CREATININE mg/dL 2 96* 3 30* 3 40*  --  3 79* 3 96* 4 07*  --  4 27*  --  4 38*  --  4 72*   CALCIUM mg/dL 8 3 8 0* 8 3  --  8 2* 8 3 8 3  --  8 1*  --  8 1*  --  8 2*   MAGNESIUM mg/dL  --   --   --   --  1 8  --  1 9  --  1 9  --   --   --   --    PHOSPHORUS mg/dL  --   --   --   --   --  4 5*  --   --  5 4*  --   --   --  4 8*    < > = values in this interval not displayed  I have personally reviewed the blood work as stated above and in my note

## 2022-08-10 NOTE — PLAN OF CARE
Problem: MOBILITY - ADULT  Goal: Maintain or return to baseline ADL function  Description: INTERVENTIONS:  -  Assess patient's ability to carry out ADLs; assess patient's baseline for ADL function and identify physical deficits which impact ability to perform ADLs (bathing, care of mouth/teeth, toileting, grooming, dressing, etc )  - Assess/evaluate cause of self-care deficits   - Assess range of motion  - Assess patient's mobility; develop plan if impaired  - Assess patient's need for assistive devices and provide as appropriate  - Encourage maximum independence but intervene and supervise when necessary  - Involve family in performance of ADLs  - Assess for home care needs following discharge   - Consider OT consult to assist with ADL evaluation and planning for discharge  - Provide patient education as appropriate  Outcome: Progressing  Goal: Maintains/Returns to pre admission functional level  Description: INTERVENTIONS:  - Perform BMAT or MOVE assessment daily    - Set and communicate daily mobility goal to care team and patient/family/caregiver  - Collaborate with rehabilitation services on mobility goals if consulted  - Perform Range of Motion 3 times a day  - Reposition patient every 2 hours    - Dangle patient 3 times a day  - Stand patient 3 times a day  - Ambulate patient 3 times a day  - Out of bed to chair 3 times a day   - Out of bed for meals 3 times a day  - Out of bed for toileting  - Record patient progress and toleration of activity level   Outcome: Progressing     Problem: Prexisting or High Potential for Compromised Skin Integrity  Goal: Skin integrity is maintained or improved  Description: INTERVENTIONS:  - Identify patients at risk for skin breakdown  - Assess and monitor skin integrity  - Assess and monitor nutrition and hydration status  - Monitor labs   - Assess for incontinence   - Turn and reposition patient  - Assist with mobility/ambulation  - Relieve pressure over bony prominences  - Avoid friction and shearing  - Provide appropriate hygiene as needed including keeping skin clean and dry  - Evaluate need for skin moisturizer/barrier cream  - Collaborate with interdisciplinary team   - Patient/family teaching  - Consider wound care consult   Outcome: Progressing     Problem: PAIN - ADULT  Goal: Verbalizes/displays adequate comfort level or baseline comfort level  Description: Interventions:  - Encourage patient to monitor pain and request assistance  - Assess pain using appropriate pain scale  - Administer analgesics based on type and severity of pain and evaluate response  - Implement non-pharmacological measures as appropriate and evaluate response  - Consider cultural and social influences on pain and pain management  - Notify physician/advanced practitioner if interventions unsuccessful or patient reports new pain  Outcome: Progressing     Problem: INFECTION - ADULT  Goal: Absence or prevention of progression during hospitalization  Description: INTERVENTIONS:  - Assess and monitor for signs and symptoms of infection  - Monitor lab/diagnostic results  - Monitor all insertion sites, i e  indwelling lines, tubes, and drains  - Monitor endotracheal if appropriate and nasal secretions for changes in amount and color  - [place] appropriate cooling/warming therapies per order  - Administer medications as ordered  - Instruct and encourage patient and family to use good hand hygiene technique  - Identify and instruct in appropriate isolation precautions for identified infection/condition  Outcome: Progressing  Goal: Absence of fever/infection during neutropenic period  Description: INTERVENTIONS:  - Monitor WBC    Outcome: Progressing     Problem: SAFETY ADULT  Goal: Maintain or return to baseline ADL function  Description: INTERVENTIONS:  -  Assess patient's ability to carry out ADLs; assess patient's baseline for ADL function and identify physical deficits which impact ability to perform ADLs (bathing, care of mouth/teeth, toileting, grooming, dressing, etc )  - Assess/evaluate cause of self-care deficits   - Assess range of motion  - Assess patient's mobility; develop plan if impaired  - Assess patient's need for assistive devices and provide as appropriate  - Encourage maximum independence but intervene and supervise when necessary  - Involve family in performance of ADLs  - Assess for home care needs following discharge   - Consider OT consult to assist with ADL evaluation and planning for discharge  - Provide patient education as appropriate  Outcome: Progressing  Goal: Maintains/Returns to pre admission functional level  Description: INTERVENTIONS:  - Perform BMAT or MOVE assessment daily    - Set and communicate daily mobility goal to care team and patient/family/caregiver     - Collaborate with rehabilitation services on mobility goals if consulted  - Out of bed for toileting  - Record patient progress and toleration of activity level   Outcome: Progressing  Goal: Patient will remain free of falls  Description: INTERVENTIONS:  - Educate patient/family on patient safety including physical limitations  - Instruct patient to call for assistance with activity   - Consult OT/PT to assist with strengthening/mobility   - Keep Call bell within reach  - Keep bed low and locked with side rails adjusted as appropriate  - Keep care items and personal belongings within reach  - Initiate and maintain comfort rounds  - Make Fall Risk Sign visible to staff  - Offer Toileting every 2 Hours, in advance of need  - Initiate/Maintain bed alarm  - Apply yellow socks and bracelet for high fall risk patients  - Consider moving patient to room near nurses station  Outcome: Progressing     Problem: DISCHARGE PLANNING  Goal: Discharge to home or other facility with appropriate resources  Description: INTERVENTIONS:  - Identify barriers to discharge w/patient and caregiver  - Arrange for needed discharge resources and transportation as appropriate  - Identify discharge learning needs (meds, wound care, etc )  - Arrange for interpretive services to assist at discharge as needed  - Refer to Case Management Department for coordinating discharge planning if the patient needs post-hospital services based on physician/advanced practitioner order or complex needs related to functional status, cognitive ability, or social support system  Outcome: Progressing     Problem: Knowledge Deficit  Goal: Patient/family/caregiver demonstrates understanding of disease process, treatment plan, medications, and discharge instructions  Description: Complete learning assessment and assess knowledge base  Interventions:  - Provide teaching at level of understanding  - Provide teaching via preferred learning methods  Outcome: Progressing     Problem: Potential for Falls  Goal: Patient will remain free of falls  Description: INTERVENTIONS:  - Educate patient/family on patient safety including physical limitations  - Instruct patient to call for assistance with activity   - Consult OT/PT to assist with strengthening/mobility   - Keep Call bell within reach  - Keep bed low and locked with side rails adjusted as appropriate  - Keep care items and personal belongings within reach  - Initiate and maintain comfort rounds  - Make Fall Risk Sign visible to staff  - Apply yellow socks and bracelet for high fall risk patients  - Consider moving patient to room near nurses station  Outcome: Progressing     Problem: Nutrition/Hydration-ADULT  Goal: Nutrient/Hydration intake appropriate for improving, restoring or maintaining nutritional needs  Description: Monitor and assess patient's nutrition/hydration status for malnutrition  Collaborate with interdisciplinary team and initiate plan and interventions as ordered  Monitor patient's weight and dietary intake as ordered or per policy  Utilize nutrition screening tool and intervene as necessary  Determine patient's food preferences and provide high-protein, high-caloric foods as appropriate       INTERVENTIONS:  - Monitor oral intake, urinary output, labs, and treatment plans  - Assess nutrition and hydration status and recommend course of action  - Evaluate amount of meals eaten  - Assist patient with eating if necessary   - Allow adequate time for meals  - Recommend/ encourage appropriate diets, oral nutritional supplements, and vitamin/mineral supplements  - Order, calculate, and assess calorie counts as needed  - Recommend, monitor, and adjust tube feedings and TPN/PPN based on assessed needs  - Assess need for intravenous fluids  - Provide specific nutrition/hydration education as appropriate  - Include patient/family/caregiver in decisions related to nutrition  Outcome: Progressing

## 2022-08-10 NOTE — PROGRESS NOTES
Progress Note - Cardiology   Reji Height 73 y o  male MRN: [id number]  Unit/Bed#: -01 SDU Encounter: [id number]        Problem List:  Principal Problem:    Acute diverticulitis  Active Problems:    Essential hypertension    Chronic atrial fibrillation (HCC)    Morbid obesity (HCC)    Acute on chronic combined systolic and diastolic congestive heart failure (HCC)    GERD (gastroesophageal reflux disease)    NALLELY (obstructive sleep apnea)    DORA (acute kidney injury) ([facility] )    Multiple myeloma (HCC)    Chronic obstructive pulmonary disease, unspecified (HCC)    Ambulatory dysfunction    Anemia    Stage 3b chronic kidney disease ([facility] )      Asessment:  1  Bradycardia- reason for consultation  a  Tele 8-7 overnight into 8-8 showing AFib with slow ventricular response, heart rate in the 40s and 50s  b  6 2nd pause at 2225 on 08/07/2022  2  Nausea/vomiting, Acute diverticulitis (reason for presentation)  a  He has been seen by GI and surgery and this was managed with IV antibiotics, no procedures needed  3  Acute on chronic kidney disease  a  Creatinine on arrival 5 78, currently trending around 3 5  4  Chronic atrial fibrillation  a  On warfarin for CVA risk reduction  5  Cardiomyopathy, unspecified type  a  Echocardiograms from 2015, 2016, in 2019 showing EF 55-60% with grade 2 diastolic dysfunction  b  Echo 10/05/2021:  EF 45% with regional wall motion abnormalities, mild MR  c  Nuclear stress test 10/15/2021:  Nondiagnostic EKG, small fixed basal to inferior defect suspect due to diaphragmatic attenuation with no definitive evidence of ischemia, EF read as 36%  6  Chronic combined systolic and diastolic heart failure  a  OP diuretic torsemide 40 twice daily  b  Previous outpatient weight 126 kg at nephrology office May 2022  7  Hypertension  8  Obstructive sleep apnea  9  Dyslipidemia  10  Take 2 diabetes  11  Multiple myeloma  a   Seen by Heme-Onc July 2022 noting plan for repeat blood work to re-evaluate his myeloma  b  Receiving weekly Chemo therapy  12  Acute on chronic anemia requiring 1 unit packed red blood cells 08/05/2022  13  Peripheral vascular disease, history of left AKA      Plan/ Discussion:   Tele remains stable  Still with bradycardia overnight HR in the low 40's but no high grade AV block noted  He remains hemodynamically stable  Suspect slow HR is due to sleep apnea   Metoprolol was discontinued earlier this admission   Continue to hold metoprolol   Warfarin remains on hold for supra-therapeutic INR   Continue PO torsemide   No further inpatient cardiac workup is planned, will follow tele while is he is here    Subjective:  Feeling fine, no complaints    Vitals:  Vitals:    08/09/22 0533 08/10/22 0547   Weight: (!) 139 kg (305 lb 8 9 oz) (!) 136 kg (300 lb 0 7 oz)   ,  Vitals:    08/10/22 0315 08/10/22 0400 08/10/22 0547 08/10/22 0814   BP:  118/53     BP Location:  Right arm     Pulse:  61     Resp:  20     Temp:  98 1 °F (36 7 °C)  97 9 °F (36 6 °C)   TempSrc:  Oral  Oral   SpO2: 98% 97%     Weight:   (!) 136 kg (300 lb 0 7 oz)    Height:           Exam:  General: Alert awake and oriented, no acute distress  Heart:  irregular rate and rhythm, no murmurs, Normal S1, no edema    Respiratory effort/ Lungs:  Breathing comfortably on room air, clear bilaterally without wheezing, rales, crackles   Abdominal: Non-tender to palpation, + bowel sounds, soft, no masses or distension  Lower Limbs:  No edema            Telemetry:       Atrial fibrillation, Heart Rate 40-80    Medications:    Current Facility-Administered Medications:     acetaminophen (TYLENOL) tablet 650 mg, 650 mg, Oral, Q6H PRN, Matias Bryan MD, 650 mg at 08/06/22 2253    Atropine Sulfate injection 0 4 mg, 0 4 mg, Intravenous, Once, NICOLA Lowry    cephalexin (KEFLEX) capsule 500 mg, 500 mg, Oral, TID, Katarina Galaviz MD, 500 mg at 08/10/22 0949    cholestyramine sugar free (QUESTRAN LIGHT) packet 4 g, 4 g, Oral, BID, Renée Chan MD, 4 g at 08/10/22 0949    fluticasone (FLONASE) 50 mcg/act nasal spray 1 spray, 1 spray, Each Nare, BID, Julia Parr PA-C, 1 spray at 08/10/22 0950    fluticasone-vilanterol (BREO ELLIPTA) 200-25 MCG/INH inhaler 1 puff, 1 puff, Inhalation, Daily, Julia Parr PA-C, 1 puff at 08/10/22 0950    insulin lispro (HumaLOG) 100 units/mL subcutaneous injection 1-5 Units, 1-5 Units, Subcutaneous, HS, Jesica Santana PA-C, 1 Units at 08/09/22 2140    insulin lispro (HumaLOG) 100 units/mL subcutaneous injection 2-12 Units, 2-12 Units, Subcutaneous, TID AC, 4 Units at 08/09/22 1532 **AND** Fingerstick Glucose (POCT), , , 4x Daily AC and at bedtime, NICOLA Morales    loperamide (IMODIUM) capsule 2 mg, 2 mg, Oral, Q2H PRN, Kimberli FOURNIER PA-C, 2 mg at 08/09/22 1717    metroNIDAZOLE (FLAGYL) tablet 500 mg, 500 mg, Oral, Q8H [address], Tricia Martinez MD, 500 mg at 08/10/22 0500    ondansetron (ZOFRAN) injection 4 mg, 4 mg, Intravenous, Q6H PRN, Renée Chan MD    pantoprazole (PROTONIX) injection 40 mg, 40 mg, Intravenous, Q24H JULIEN, Julia Parr PA-C, 40 mg at 08/10/22 0949    potassium chloride (K-DUR,KLOR-CON) CR tablet 20 mEq, 20 mEq, Oral, BID, Tricia Martinez MD, 20 mEq at 08/10/22 0950    torsemide (DEMADEX) tablet 40 mg, 40 mg, Oral, Daily, Tricia Martinez MD, 40 mg at 08/10/22 0949      Labs/Data:        Results from last 7 days   Lab Units 08/10/22  0506 08/09/22  0529 08/08/22  0519   WBC Thousand/uL 8 22 6 89 6 59   HEMOGLOBIN g/dL 8 6* 8 1* 8 9*   HEMATOCRIT % 28 9* 27 5* 29 5*   PLATELETS Thousands/uL 361 347 313     Results from last 7 days   Lab Units 08/10/22  0506 08/09/22  0529 08/08/22  0519   POTASSIUM mmol/L 3 5 3 2* 3 8   CHLORIDE mmol/L 111* 110* 110*   CO2 mmol/L 22 21 19*   BUN mg/dL 44* 48* 53*

## 2022-08-10 NOTE — PROGRESS NOTES
[place]  Progress Note - Christiano Holt 1950, 73 y o  male MRN: [id number]  Unit/Bed#: -01 SDU Encounter: [id number]  Primary Care Provider: Benson Lira MD   Date and time admitted to hospital: 8/1/2022  8:27 PM    * Acute diverticulitis  Assessment & Plan  Patient was admitted with nausea, vomiting, diarrhea due to acute sigmoid diverticulitis  Patient was treated with IV fluids, IV Rocephin Flagyl  Patient claims that he has chronic left-sided abdominal pain which is about the same on day of discharge as before hospital admission  He has mild left abdominal tenderness without guarding on day of discharge  He tolerates diet without nausea abdominal pain  He will complete 1 more day of p o  Keflex and Flagyl therapy as an outpatient    Patient is stable for discharge today    I left message on son's voicemail on August 10th notifying him of discharge      DORA (acute kidney injury) [facility])  Assessment & Plan  Patient was admitted with acute kidney injury on stage IV chronic kidney disease with baseline creatinine of 1 8-2 0 likely due to ATN  He was initially treated with IV fluids and nephrology was following the patient  Montague catheter was inserted to monitor urine output  Renal function continues to improve:  Creatinine decreased to 2 96 on day of discharge  I discontinued Montague catheter and patient was able to void without difficulty  Had 60 cc of postvoid residual by bladder scan    I discussed the case with Cardiology  He will follow-up with nephrology as an outpatient    Acute on chronic combined systolic and diastolic congestive heart failure [facility])  Assessment & Plan  Wt Readings from Last 3 Encounters:   08/10/22 (!) 136 kg (300 lb 0 7 oz)   07/26/22 131 kg (288 lb 5 8 oz)   07/19/22 127 kg (281 lb 1 4 oz)       Patient has chronic systolic CHF with ejection fraction of 45% in October last year      He had fluid overload from IV fluids that he received for acute sigmoid diverticulitis and acute kidney injury  He was treated with IV Lasix subsequently    He denies shortness of breath today  Lungs are clear to auscultation without wheezing or crackles  Torsemide is resumed on discharge    Discussed the case with Cardiology          Chronic atrial fibrillation [facility])  Assessment & Plan  Patient has chronic atrial fibrillation    He was on Toprol and warfarin prior to admission  He developed bradycardia in the hospital with heart rates in the 20s during the hospital stay that was treated with IV atropine x1 and discontinuing metoprolol  His heart rates remain well controlled without metoprolol  His INR was 3 53 on August 9th and 3 39 on day of discharge  Warfarin has been on hold  INR should be checked tomorrow in the nursing home    Patient had no evidence of GI or  bleeding    Anemia  Assessment & Plan  Patient has history of anemia in setting of CKD/multiple myeloma  He had no signs of GI or  bleeding  He required blood transfusion for the anemia  Hemoglobin is 8 6 on discharge    Patient had EGD and colonoscopy this year        Morbid obesity ([facility] )  Assessment & Plan  BMI is 40 69  Patient has morbid obesity  Encourage weight loss and lifestyle modification    Diabetes, polyneuropathy [facility])  Assessment & Plan  Lab Results   Component Value Date    HGBA1C 6 3 (H) 08/05/2022       Recent Labs     08/09/22  2041 08/10/22  0810 08/10/22  1123 08/10/22  1605   POCGLU 187* 137 163* 147*       Blood Sugar Average: Last 72 hrs:  (P) 207 4375     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    I am discharging patient with reduced dose of Lantus, he will continue Victoza  I discontinue metformin on discharge due to his renal function    Chronic obstructive pulmonary disease, unspecified ([facility] )  Assessment & Plan  Patient has chronic COPD  He had no COPD exacerbation during hospital stay     His lungs are clear to auscultation on day of discharge without wheezing or crackles  He will continue Advair and albuterol          Hospital Course:     Kanwal Cleaning is a 73 y o  male patient who originally presented to the hospital on   Admission Orders (From admission, onward)     Ordered        08/01/22 2305  INPATIENT ADMISSION  Once                     due to acute diverticulitis, acute kidney injury    Please see above list of diagnoses and related plan for additional information  Condition at Discharge:  good      Discharge instructions/Information to patient and family:   See after visit summary for information provided to patient and family  Provisions for Follow-Up Care:  See after visit summary for information related to follow-up care and any pertinent home health orders  Disposition:     [facility] at [facility]       Discharge Statement:  I spent 40 minutes discharging the patient  This time was spent on the day of discharge  I had direct contact with the patient on the day of discharge  Greater than 50% of the total time was spent examining patient, answering all patient questions, arranging and discussing plan of care with patient as well as directly providing post-discharge instructions  Additional time then spent on discharge activities  Discharge Medications:  See after visit summary for reconciled discharge medications provided to patient and family        ** Please Note: This note has been constructed using a voice recognition system **

## 2022-08-11 NOTE — UTILIZATION REVIEW
Notification of Discharge   This is a Notification of Discharge from our facility [address]  Please be advised that this patient has been discharge from our facility  Below you will find the admission and discharge date and time including the patients disposition  UTILIZATION REVIEW CONTACT:  Magali Schultz  Utilization   Network Utilization Review Department  Phone: [phone] carefully listen to the prompts  All voicemails are confidential   Email: [email or web address]     PHYSICIAN ADVISORY SERVICES:  FOR [id number] REVIEW - MEDICAL NECESSITY DENIAL  Phone: [phone]  Fax: [phone]  Email: [email or web address]     PRESENTATION DATE: 8/1/2022  8:27 PM  OBERVATION ADMISSION DATE:   INPATIENT ADMISSION DATE: 8/1/22 11:05 PM   DISCHARGE DATE: 8/10/2022  8:00 PM  DISPOSITION: Non [facility] SN[facility]/TCU/SNU Non [facility] SNF/TCU/SNU      IMPORTANT INFORMATION:  Send all requests for admission clinical reviews, approved or denied determinations and any other requests to dedicated fax number below belonging to the campus where the patient is receiving treatment   List of dedicated fax numbers:  [address] [facility] [address] DENIALS (Administrative/Medical Necessity) [phone]   [address]  (Maternity/NICU/Pediatrics) [phone]   [facility] [phone]   [address] [facility] [phone]   [address] [place] [address] [phone]   [address] [place] [address] [address] [place] [address],[address] [place] [address] [phone]   [facility]  [phone]   [address] [facility]  [address] [facility] [address] [facility] [phone]

## 2022-08-12 ENCOUNTER — TELEPHONE (OUTPATIENT)
Dept: NEPHROLOGY | Facility: CLINIC | Age: 72
End: 2022-08-12

## 2022-08-12 NOTE — TELEPHONE ENCOUNTER
----- Message from [place], [place] sent at 8/10/2022  9:33 AM EDT -----  Patient to be discharged from [address] today  Please schedule for DORA/CKD follow up as schedule allows preferably within the next month  I have ordered for repeat BMP next week  Thanks

## 2022-08-16 ENCOUNTER — TELEPHONE (OUTPATIENT)
Dept: HEMATOLOGY ONCOLOGY | Facility: CLINIC | Age: 72
End: 2022-08-16

## 2022-08-16 NOTE — TELEPHONE ENCOUNTER
Patient need a f/u prior to resuming treatment  He was in the ICU  Can you please call the nursing facility he resides in at [phone] to coordinate a follow up  Thank you

## 2022-08-16 NOTE — TELEPHONE ENCOUNTER
Spoke with pt's nursing Yessica Trujillo? Regarding his f/u appt with us  Appt set up on 8/19 this Friday  Pt will have treatment on 8/23  Confirmed that pt has recent myeloma workup on 7/18 and CBC/ CMP today  Yessica Trujillo will help fax them over to us today

## 2022-08-16 NOTE — TELEPHONE ENCOUNTER
Called patient's temporary phone number to schedule appointment  Nurse at living facility stated they would call to schedule  This is the first attempt

## 2022-08-19 ENCOUNTER — TELEPHONE (OUTPATIENT)
Dept: HEMATOLOGY ONCOLOGY | Facility: HOSPITAL | Age: 72
End: 2022-08-19

## 2022-08-19 ENCOUNTER — OFFICE VISIT (OUTPATIENT)
Dept: HEMATOLOGY ONCOLOGY | Facility: HOSPITAL | Age: 72
End: 2022-08-19
Payer: COMMERCIAL

## 2022-08-19 VITALS
RESPIRATION RATE: 14 BRPM | HEIGHT: 72 IN | OXYGEN SATURATION: 98 % | TEMPERATURE: 97.8 F | BODY MASS INDEX: 40.69 KG/M2 | HEART RATE: 64 BPM | SYSTOLIC BLOOD PRESSURE: 138 MMHG | DIASTOLIC BLOOD PRESSURE: 80 MMHG

## 2022-08-19 DIAGNOSIS — C90.00 MULTIPLE MYELOMA NOT HAVING ACHIEVED REMISSION (HCC): Primary | ICD-10-CM

## 2022-08-19 PROCEDURE — 99214 OFFICE O/P EST MOD 30 MIN: CPT | Performed by: INTERNAL MEDICINE

## 2022-08-19 NOTE — PROGRESS NOTES
Hematology/Oncology Outpatient Follow- up Note  Khalif Jay 73 y o  male MRN: @ Encounter: [id number]        Date:  8/19/2022    Presenting Complaint/Diagnosis :   Biclonal gammopathy with IgG Kappa  Bone marrow biopsy revealed multiple myeloma  HPI:    The patient was admitted to the hospital with wound infection  Liseth Davis has a history of DM, HTN, and several other comorbid conditions   He seems to have had a slightly elevated creatinine for about the past year, seems to be slightly increasing more recently  He has also been anemic for quiet some time  Skeletal survey was negative but we did a bone marrow biopsy which showed plasma cell neoplasm consistent with myeloma so he was referred to see us      Previous Hematologic/ Oncologic History:    Oncology History   Multiple myeloma ([facility] )   9/16/2019 Initial Diagnosis    Multiple myeloma not having achieved remission ([facility] )     9/24/2019 -  Chemotherapy    iron sucrose (VENOFER), 200 mg (100 % of original dose 200 mg), Intravenous, Once, 1 of 1 cycle  Dose modification: 200 mg (original dose 200 mg, Cycle 1, Reason: Other (Must fill in a comment))  Administration: 200 mg (9/24/2019)  bortezomib (VELCADE), 1 3 mg/m2 = 3 5 mg (81 3 % of original dose 1 6 mg/m2), Subcutaneous, Once, 26 of 31 cycles  Dose modification: 1 3 mg/m2 (original dose 1 6 mg/m2, Cycle 1, Reason: Dose Not Tolerated)  Administration: 3 5 mg (9/24/2019), 3 5 mg (10/1/2019), 3 5 mg (10/8/2019), 3 5 mg (10/15/2019), 3 5 mg (1/6/2020), 3 5 mg (1/13/2020), 3 5 mg (1/20/2020), 3 5 mg (1/27/2020), 3 5 mg (2/11/2020), 3 5 mg (2/17/2020), 3 5 mg (2/24/2020), 3 5 mg (3/2/2020), 3 5 mg (3/16/2020), 3 5 mg (3/23/2020), 3 5 mg (3/30/2020), 3 5 mg (4/6/2020), 3 5 mg (4/20/2020), 3 5 mg (4/27/2020), 3 5 mg (5/4/2020), 3 5 mg (5/11/2020), 3 5 mg (5/26/2020), 3 5 mg (6/1/2020), 3 5 mg (6/8/2020), 3 5 mg (6/15/2020), 3 5 mg (7/20/2020), 3 4 mg (7/27/2020), 3 4 mg (8/24/2020), 3 5 mg (8/31/2020), 3 4 mg (9/8/2020), 3 4 mg (9/14/2020), 3 4 mg (9/28/2020), 3 4 mg (10/5/2020), 3 4 mg (11/2/2020), 3 4 mg (11/9/2020), 3 4 mg (11/23/2020), 3 4 mg (11/30/2020), 3 4 mg (12/7/2020), 3 4 mg (12/14/2020), 3 4 mg (12/28/2020), 3 4 mg (1/4/2021), 3 4 mg (1/11/2021), 3 4 mg (1/18/2021), 3 4 mg (2/3/2021), 3 4 mg (2/9/2021), 3 4 mg (2/15/2021), 3 4 mg (2/22/2021), 3 4 mg (3/9/2021), 3 4 mg (3/16/2021), 3 4 mg (3/23/2021), 3 4 mg (3/30/2021), 3 4 mg (4/13/2021), 3 4 mg (4/20/2021), 3 4 mg (4/27/2021), 3 4 mg (5/4/2021), 3 5 mg (5/18/2021), 3 5 mg (5/25/2021), 3 5 mg (6/1/2021), 3 5 mg (6/8/2021), 3 5 mg (6/22/2021), 3 5 mg (6/29/2021), 3 5 mg (7/6/2021), 3 5 mg (7/13/2021), 3 5 mg (7/27/2021), 3 5 mg (8/3/2021), 3 5 mg (8/10/2021), 3 5 mg (8/17/2021), 3 5 mg (8/31/2021), 3 5 mg (9/7/2021), 3 5 mg (9/14/2021), 3 5 mg (9/21/2021), 3 4 mg (11/2/2021), 3 4 mg (11/9/2021), 3 4 mg (11/16/2021), 3 4 mg (11/23/2021), 3 4 mg (12/7/2021), 3 4 mg (12/14/2021), 3 4 mg (12/21/2021), 3 4 mg (12/28/2021), 3 4 mg (1/11/2022), 3 4 mg (1/18/2022), 3 4 mg (1/25/2022), 3 4 mg (2/1/2022), 3 4 mg (2/17/2022), 3 4 mg (3/1/2022), 3 4 mg (3/8/2022), 3 4 mg (3/15/2022), 3 4 mg (3/29/2022), 3 4 mg (4/5/2022), 3 4 mg (4/12/2022), 3 4 mg (4/19/2022), 3 4 mg (5/3/2022), 3 4 mg (5/10/2022), 3 4 mg (5/17/2022), 3 4 mg (5/24/2022), 3 4 mg (6/7/2022), 3 4 mg (6/14/2022), 3 4 mg (6/21/2022), 3 4 mg (6/28/2022), 3 4 mg (7/12/2022), 3 4 mg (7/19/2022), 3 4 mg (7/26/2022)         Current Hematologic/ Oncologic Treatment:    Velcade and Decadron    Interval History:    The patient returns for follow-up visit  His M spike is 0 1  IgA was 154, IgG was 367, IgM was less than 21  Yet again free light chain ratio has not been done by his nursing facility nor have these labs been sent to us    I have explained to the patient on multiple occasions that we need this blood work  adequately treat him to adequately treat him and to not have blood work is a can to treating him blindly  We have called the nursing facility multiple times  We have asked them to draw his free light chain ratio multiple times  At this point neither the patient nor the nursing facility seem to be getting this blood work drawn for us to help him  We have put this in our notes and communicated this to the nursing facility he resides at with little affect  I have explained to them that the liability for not drawing his blood work with so lead lie with the nursing facility as have been ordering this regularly as I treat him for his myeloma and communicated to them that we need these tests drawn and results sent to us and it seems this test is never drawn by them  The last hemoglobin I have is from the 10th of August which was 8 6 with a white count of 8 2 and a platelet count of 641  Creatinine at that time was 2 96 with a sodium of 144 and a calcium of 8 3  At this point I will order repeat bone marrow biopsy to assess his myeloma  Again he has multiple reasons for having renal insufficiency and anemia so it is unclear whether this is related to his myeloma worsening or his other comorbidities  M spike is still miniscule at 0 1  Recent imaging including a CT scan of the abdomen, chest x-ray showed no bony lesions  The patient himself is at baseline  He resides in a wheelchair  He is an amputee  Lives at the nursing facility  Denies any nausea denies any vomiting denies any diarrhea  Was recently admitted with diverticulitis  Was treated with antibiotics and discharged  Did had acute tubular necrosis at the time with a bump in his creatinine which would explain his low hemoglobin  Iron studies were on the low side so I will give him Venofer when he comes for his Velcade for the next few cycles  The rest of his 14 point review of systems today was negative        Test Results:    Imaging: Colonoscopy    Result Date: 7/25/2022  Narrative: [facility] [address] [place] [address] [phone] DATE OF SERVICE: 7/25/22 PHYSICIAN(S): Attending: Cheyenne Garg MD Fellow: No Staff Documented INDICATION: Colon cancer screening, Constipation, unspecified constipation type POST-OP DIAGNOSIS: See the impression below  HISTORY: Prior colonoscopy: Less than 3 years ago  It is being repeated at an interval of less than 3 years because: This colonoscopy is being performed for a diagnostic indication BOWEL PREPARATION: Golytely/Colyte/Trilyte PREPROCEDURE: Informed consent was obtained for the procedure, including sedation  Risks including but not limited to bleeding, infection, perforation, adverse drug reaction and aspiration were explained in detail  Also explained about less than 100% sensitivity with the exam and other alternatives  The patient was placed in the left lateral decubitus position  DETAILS OF PROCEDURE: Patient was taken to the procedure room where a time out was performed to confirm correct patient and correct procedure  The patient underwent monitored anesthesia care, which was administered by an anesthesia professional  The patient's blood pressure, heart rate, level of consciousness, oxygen and respirations were monitored throughout the procedure  A digital rectal exam was performed  The scope was introduced through the anus and advanced to the cecum  Retroflexion was performed in the rectum  The quality of bowel preparation was evaluated using the [facility] Bowel Preparation Scale with scores of: right colon = 2, transverse colon = 2, left colon = 2  The total BBPS score was 6  Bowel prep was adequate  The patient experienced no blood loss  The procedure was not difficult  The patient tolerated the procedure well  There were no apparent complications   ANESTHESIA INFORMATION: ASA: IV Anesthesia Type: IV Sedation with Anesthesia MEDICATIONS: No administrations occurring from 1300 to 1348 on 07/25/22 FINDINGS: One 15 mm flat polyp in the cecum; completely removed en bloc by hot snare and retrieved specimen; placed 2 clips successfully (clips are MRI conditional); hemostasis achieved One 10 mm flat polyp in the sigmoid colon; completely removed en bloc by hot snare and retrieved specimen; placed 1 clip successfully (clip is MRI conditional) Mild diverticula in the descending colon and sigmoid colon Small, internal hemorrhoids EVENTS: Procedure Events Event Event Time ENDO CECUM REACHED 7/25/2022  1:22 PM ENDO SCOPE OUT TIME 7/25/2022  1:46 PM SPECIMENS: ID Type Source Tests Collected by Time Destination 1 : cecal polyp - hot snare Tissue Polyp, Colorectal TISSUE EXAM Prachi Maravilla MD 7/25/2022  1:24 PM  2 : sigmoid colon polyp - hot snare Tissue Polyp, Colorectal TISSUE EXAM Prachi Maravilla MD 7/25/2022  1:38 PM  EQUIPMENT: Colonoscope -[id number]     Impression: Large polyp removed from the cecum  Another polyp removed from sigmoid colon  Mild diverticulosis on the left side  Small internal hemorrhoids  RECOMMENDATION: Repeat colonoscopy in 3 years due to a personal history of colon polyps Follow up pathology  If you have abdominal pain, bleeding or fever call my office at [phone]  Prachi Maravilla MD     CT abdomen pelvis wo contrast    Result Date: 8/1/2022  Narrative: CT ABDOMEN AND PELVIS WITHOUT IV CONTRAST INDICATION:   Nausea/vomiting generalized abdominal pain, vomiting, diarrhea  Myeloma WBCs ~ 10 COMPARISON:  CT 10/4/21 TECHNIQUE:  CT examination of the abdomen and pelvis was performed without intravenous contrast  Axial, sagittal, and coronal 2D reformatted images were created from the source data and submitted for interpretation  Radiation dose length product (DLP) for this visit:  1443 58 mGy-cm   This examination, like all CT scans performed in the [facility], was performed utilizing techniques to minimize radiation dose exposure, including the use of iterative reconstruction and automated exposure control  Enteric contrast was administered  FINDINGS: ABDOMEN LOWER CHEST:  Scattered patchy opacities in the left base Cardiomegaly LIVER/BILIARY TREE:  Scattered branching areas of intraparenchymal air, including in the periphery exemplified on series 2 / 18  Large amount of air seen on series 2/19, likely within the left portal vein GALLBLADDER:  No calcified gallstones  No pericholecystic inflammatory change  SPLEEN:  Unremarkable  PANCREAS:  Unremarkable  ADRENAL GLANDS:  Unremarkable  KIDNEYS/URETERS:  Unremarkable  No hydronephrosis  STOMACH AND BOWEL:  Large hiatal hernia Colonic diverticulosis within inflamed diverticulum anteriorly in the sigmoid colon series 2/82  No pneumatosis  Clips noted ascending colon APPENDIX:  No findings to suggest appendicitis  ABDOMINOPELVIC CAVITY:  While there is no free air in the peritoneal cavity, there is air within veins within the upper abdomen and left upper quadrant seen on series 2 images 30 and 32, which drain into the portal vein  VESSELS:  Unremarkable for patient's age  PELVIS REPRODUCTIVE ORGANS:  Unremarkable for patient's age  URINARY BLADDER:  Unremarkable  ABDOMINAL WALL/INGUINAL REGIONS:  Inguinal hernias OSSEOUS STRUCTURES:  No acute fracture or destructive osseous lesion  Impression: 1  Acute sigmoid diverticulitis with intrahepatic and extrahepatic portal venous air, concerning for ischemic bowel  No pneumatosis is identified  2   Left basilar opacities concerning for pneumonia I personally discussed impression 1 with FRANTZ Almeida on 8/1/2022 at 10:25 PM  Workstation performed: [id number]     XR chest portable    Result Date: 8/4/2022  Narrative: CHEST INDICATION:   Shortness of breath rule out CHF  COMPARISON:  8/2/2022 EXAM PERFORMED/VIEWS:  XR CHEST PORTABLE FINDINGS: Unchanged cardiomegaly  Vascular congestion and bilateral pleural effusions likely on the basis of CHF  Osseous structures appear within normal limits for patient age       Impression: Mild central vascular congestion with probable small pleural effusions in keeping with CHF  Workstation performed: [id number]     XR chest portable    Result Date: 8/4/2022  Narrative: CHEST INDICATION:   sob  COMPARISON:  Chest radiograph October 7, 2021  Correlation CT abdomen pelvis August 1, 2022 EXAM PERFORMED/VIEWS:  XR CHEST PORTABLE FINDINGS: Heart shadow is enlarged but unchanged from prior exam  No focal consolidation, pleural effusion or pneumothorax  Osseous structures appear within normal limits for patient age  Impression: Persistent enlargement of the cardiomediastinal silhouette  No focal consolidation, pleural effusion or pneumothorax  The left basilar opacities evident on the prior CT are obscured by the heart on this portable chest radiograph  Workstation performed: [id number]       Labs:   Lab Results   Component Value Date    WBC 8 22 08/10/2022    HGB 8 6 (L) 08/10/2022    HCT 28 9 (L) 08/10/2022    MCV 88 08/10/2022     08/10/2022     Lab Results   Component Value Date     01/15/2018    K 3 5 08/10/2022     (H) 08/10/2022    CO2 22 08/10/2022    ANIONGAP 11 12/22/2015    BUN 44 (H) 08/10/2022    CREATININE 2 96 (H) 08/10/2022    GLUCOSE 64 (L) 08/01/2022    GLUF 185 (H) 02/24/2022    CALCIUM 8 3 08/10/2022    CORRECTEDCA 9 5 08/03/2022    AST 14 08/03/2022    ALT 17 08/03/2022    ALKPHOS 90 08/03/2022    PROT 7 1 01/15/2018    BILITOT 0 6 01/15/2018    EGFR 20 08/10/2022         Lab Results   Component Value Date    SPEP See Comment 03/10/2022    UPEP  08/04/2019     The UPEP shows non-selective proteinuria  The UPEP shows a monoclonal gammopathy  Immunofixation to be performed  Reviewed by: Nancy Pedraza MD **Electronic Signature**     Lab Results   Component Value Date    IRON 17 (L) 08/04/2022    TIBC 206 (L) 08/04/2022    FERRITIN 165 08/04/2022       ROS: As stated in the history of present illness otherwise his 14 point review of systems today was negative        Active Problems: Patient Active Problem List   Diagnosis    Diabetic foot ulcer ([place] [address] )    Diabetes mellitus type 2, uncontrolled    Chronic venous hypertension, right    Essential hypertension    Chronic atrial fibrillation (HCC)    Morbid obesity ([place] [address] )    Acute on chronic combined systolic and diastolic congestive heart failure (HCC)    Cardiomyopathy (HCC)    Diabetes, polyneuropathy ([place] [address] )    GERD (gastroesophageal reflux disease)    Hyperlipidemia    Onychomycosis    Gallstones    Localized edema    Peripheral vascular disease (HCC)    SOB (shortness of breath)    NALLELY (obstructive sleep apnea)    Moderate persistent asthma without complication    DORA (acute kidney injury) ([place] [address] )    Multiple myeloma ([place] [address] )    Above knee amputation of left lower extremity ([place] [address] )    Hiatal hernia    Weakness    Type 2 acute myocardial infarction (HCC)    Chronic obstructive pulmonary disease, unspecified ([place] [address] )    Hypertensive chronic kidney disease w stg 1-4/unsp chr kdny    Mass of psoas muscle    CKD (chronic kidney disease)    Lymphedema    Rash    Difficulty in walking, not elsewhere classified    Gout    Venous insufficiency (chronic) (peripheral)    Sepsis due to COVID-19 pneumonia ([place] [address] )    Fall from bed    Need for influenza vaccination    Venous hypertension, chronic, with ulcer and inflammation, right (HCC)    Traumatic skin ulcer ([place] [address] )    Ambulatory dysfunction    Anemia    Supratherapeutic INR    Stage 3b chronic kidney disease ([place] [address] )    Acute diverticulitis       Past Medical History:   Past Medical History:   Diagnosis Date    Cancer ([place] [address] )     CHF (congestive heart failure) ([place] [address] )     Chronic kidney disease     COPD (chronic obstructive pulmonary disease) ([place] [address] )     COVID-19     Decubitus ulcer of heel     LAST ASSESSED [id number]    Diabetes mellitus ([place] [address] )     History of varicose veins     Hypertension     Intermittent claudication (HCC)     Neuropathy     Seasonal allergies Surgical History:   Past Surgical History:   Procedure Laterality Date    AMPUTATION ABOVE KNEE (AKA) Left 7/31/2019    Procedure: AMPUTATION ABOVE KNEE (AKA); Surgeon: Katie Riley MD;  Location:  MAIN OR;  Service: General    ANGIOPLASTY      W/ FLUOROSC ANGIOGRAPGY PERIPHERAL ARTERY ADDITIONAL  LAST ASSESSED [id number]    ANGIOPLASTY / STENTING FEMORAL      TANSCATH INTRAVASCULAR STENT PLACEMENT PERCUTANEOUS FEMORAL     COLONOSCOPY  2010    CT BONE MARROW BIOPSY AND ASPIRATION  8/9/2019    FULL THICKNESS SKIN GRAFT      TENDON REPAIR      TOE AMPUTATION Left 12/27/2018    Procedure: AMPUTATION left 4th TOE;  Surgeon: Carlos Lara DPM;  Location: [facility];  Service: Podiatry       Family History:    Family History   Problem Relation Age of Onset    Other Mother         CARDIAC DISORDER     Diabetes Mother     Cancer Father     Other Family         CARDIAC DISORDER     Diabetes Family     Cancer Family     Mental illness Family     Kidney disease Sister     Diabetes Sister        Cancer-related family history includes Cancer in his family and father      Social History:   Social History     Socioeconomic History    Marital status:      Spouse name: Not on file    Number of children: 1    Years of education: Not on file    Highest education level: Not on file   Occupational History    Occupation: RETIRED   Tobacco Use    Smoking status: Never Smoker    Smokeless tobacco: Never Used   Vaping Use    Vaping Use: Never used   Substance and Sexual Activity    Alcohol use: Not Currently    Drug use: Not Currently    Sexual activity: Not Currently   Other Topics Concern    Not on file   Social History Narrative    DENIED [address]    FEELS SAFE AT HOME    LIVES WITH FAMILY - SISTER    NO LIVING WILL    POA IN EXISTENCE     SUPPORTIVE AND SAFE    One son, 2 granddaughters Social Determinants of Health     Financial Resource Strain: Not on file   Food Insecurity: Not on file   Transportation Needs: Not on file   Physical Activity: Not on file   Stress: Not on file   Social Connections: Not on file   Intimate Partner Violence: Not on file   Housing Stability: [place] Unable to Pay for Housing in the Last Year: No    Number of Places Lived in the Last Year: 1    Unstable Housing in the Last Year: No       Current Medications:   Current Outpatient Medications   Medication Sig Dispense Refill    acetaminophen (TYLENOL) 500 mg tablet Take 1 tablet (500 mg total) by mouth every 6 (six) hours as needed for mild pain, headaches or fever 90 tablet 1    albuterol (PROVENTIL HFA,VENTOLIN HFA) 90 mcg/act inhaler Inhale 2 puffs every 6 (six) hours as needed for wheezing 1 Inhaler 0    Alcohol Swabs (ALCOHOL PREP) 70 % PADS   0    allopurinol (ZYLOPRIM) 300 mg tablet TAKE ONE TABLET BY MOUTH DAILY (Patient taking differently: 300 mg) 30 tablet 5    ammonium lactate (LAC-HYDRIN) 12 % lotion APPLY TO BLE TOPICALLY DAILY 400 g 2    atorvastatin (LIPITOR) 40 mg tablet Take 1 tablet (40 mg total) by mouth daily after dinner 30 tablet 0    BD AUTOSHIELD DUO 30G X 5 MM MISC USE AS DIRECTED WITH INSULIN FOUR TIMES A  each 3    BD INSULIN SYRINGE U/F 31G X 5/16" 0 5 ML MISC USE AS DIRECTED WITH NOVOLIN 70/30  0    BD PEN NEEDLE HILARIO U/F 32G X 4 MM MISC by Other route 3 (three) times a day 300 each 1    cholestyramine sugar free (QUESTRAN LIGHT) 4 g packet Take 1 packet (4 g total) by mouth 2 (two) times a day 30 packet 0    clotrimazole (LOTRIMIN) 1 % cream APPLY TO BUTTOCK 2 TIMES DAILY 45 g 3    Easy Touch Safety Lancets 28G MISC USE 4 TIMES DAILY TO TEST BLOOD SUGAR 100 each 5    fluticasone (FLONASE) 50 mcg/act nasal spray       fluticasone-salmeterol (ADVAIR DISKUS, WIXELA INHUB) 250-50 mcg/dose inhaler Inhale 1 puff 2 (two) times a day Rinse mouth after use   1 Inhaler 5    Insulin Glargine Solostar (Lantus SoloStar) 100 UNIT/ML SOPN Inject 10 Units under the skin daily at bedtime 15 mL 0    liraglutide (Victoza) injection Inject 1 8 mg under the skin daily      loperamide (IMODIUM) 2 mg capsule Take 2 mg by mouth 2 (two) times a day as needed for diarrhea      multivitamin-minerals therapeutic (THERA-M)       NOVOLOG FLEXPEN 100 units/mL SOPN INJ 5U BEFORE MEALS AND PER SS <250=NO CALL 250-300=5U,301-350= 10U,351-400=15U,401-450=20U>451 CALL FOR ORDERS UP TO 4X PER DAY 15 mL 5    nystatin (MYCOSTATIN) powder Apply 994,257 application topically 4 (four) times a day      omeprazole (PriLOSEC) 40 MG capsule Take 1 capsule (40 mg total) by mouth daily Half an hour prior to breakfast 30 capsule 2    ondansetron (ZOFRAN) 4 mg tablet ondansetron HCl 4 mg tablet   TAKE 1 TABLET BY MOUTH EVERY 8 HOURS AS NEEDED      potassium chloride (K-DUR,KLOR-CON) 20 mEq tablet Take 2 tablets (40 mEq total) by mouth daily  0    torsemide (DEMADEX) 20 mg tablet Take 2 tablets (40 mg total) by mouth 2 (two) times a day  0    bortezomib (VELCADE) Infuse into a venous catheter once   (Patient not taking: No sig reported)       No current facility-administered medications for this visit  Allergies: Allergies   Allergen Reactions    Latex Rash       Physical Exam:    Body surface area is 2 53 meters squared      Wt Readings from Last 3 Encounters:   08/10/22 (!) 136 kg (300 lb 0 7 oz)   07/26/22 131 kg (288 lb 5 8 oz)   07/19/22 127 kg (281 lb 1 4 oz)        Temp Readings from Last 3 Encounters:   08/19/22 97 8 °F (36 6 °C) (Temporal)   08/10/22 98 4 °F (36 9 °C) (Oral)   07/26/22 (!) 97 2 °F (36 2 °C) (Temporal)        BP Readings from Last 3 Encounters:   08/19/22 138/80   08/10/22 121/59   07/26/22 145/63         Pulse Readings from Last 3 Encounters:   08/19/22 64   08/10/22 66   07/26/22 63       Physical Exam     Constitutional   General appearance: No acute distress,Eyes Conjunctiva and lids: No swelling, erythema or discharge  Pupils and irises: Equal, round and reactive to light  Ears, Nose, Mouth, and Throat   External inspection of ears and nose: Normal     Nasal mucosa, septum, and turbinates: Normal without edema or erythema  Oropharynx: Normal with no erythema, edema, exudate or lesions  Pulmonary   Respiratory effort: No increased work of breathing or signs of respiratory distress  Auscultation of lungs: Clear to auscultation  Cardiovascular   Palpation of heart: Normal PMI, no thrills  Auscultation of heart: Normal rate and rhythm, normal S1 and S2, without murmurs  Examination of extremities for edema and/or varicosities: Normal     Carotid pulses: Normal     Abdomen   Abdomen: Non-tender, no masses  Liver and spleen: No hepatomegaly or splenomegaly  Lymphatic   Palpation of lymph nodes in neck: No lymphadenopathy  Musculoskeletal   Gait and station:  In a wheelchair       Assessment / Plan:      The patient is a very pleasant 70 yo male with multiple comorbidities including diabetes and hypertension with bone marrow biopsy proven multiple myeloma  He was referred to us in 8/2019 after 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  He was noted to have elevated kidney function and anemia which prompted work up for multiple myeloma  His skeletal survey was negative  He underwent bone marrow biopsy confirming multiple myeloma  He was initiated on treatment with with Velcade and Decadron weekly on a 35 day schedule  Velcade is 1 3 milligrams/meter squared and Decadron is 20 mg p o  On days 1, 8, 15, 22  Skeletal survey from 11/2020 showed no evidence of lytic bony lesions  CT CAP done during his last hospitalization on 10/4/21 showed no acute osseous pathology      He continues to tolerate his treatment well  He he has multiple comorbidities  Recently in the hospital for sigmoid diverticulitis and renal insufficiency    He was treated with antibiotics and discharged  His iron was slightly low so I think we will give him 6 doses of Venofer which is reasonable  I will repeat his bone marrow biopsy  He will stay on his current regimen  I have once again written to the nursing facility to please draw his free light chain ratio as we need to see this as we treat him  Recent imaging does not show any evidence of bone lesions  I will see him back with results of the bone marrow biopsy  He will stay on Velcade and will get 6 doses of Venofer  Goals and Barriers:  Current Goal:  Prolong Survival from multiple myeloma  Barriers: None  Patient's Capacity to Self Care:  Patient able to self care  Portions of the record may have been created with voice recognition software  Occasional wrong word or "sound a like" substitutions may have occurred due to the inherent limitations of voice recognition software  Read the chart carefully and recognize, using context, where substitutions have occurred

## 2022-08-19 NOTE — TELEPHONE ENCOUNTER
Called IR spoke with Karina Collins said the dr's are reviewing and will reach out to patient to schedule the bone marrow biopsy

## 2022-08-22 ENCOUNTER — TELEPHONE (OUTPATIENT)
Dept: GASTROENTEROLOGY | Facility: CLINIC | Age: 72
End: 2022-08-22

## 2022-08-22 NOTE — TELEPHONE ENCOUNTER
Our mutual patient is scheduled for procedure:  ERCP    On: 8/29/22     With: Dr Delisa Zhao is taking the following blood thinner: Coumadin    Can this be stopped 5 days prior to the procedure?       Physician Approving clearance: ________________________

## 2022-08-22 NOTE — TELEPHONE ENCOUNTER
Spoke with Meet Wilson at nursing home advising Dr Lena Ko has approved patient to stop his coumadin 5 days prior to procedure

## 2022-08-23 ENCOUNTER — HOSPITAL ENCOUNTER (OUTPATIENT)
Dept: INFUSION CENTER | Facility: HOSPITAL | Age: 72
Discharge: HOME | End: 2022-08-23
Attending: INTERNAL MEDICINE
Payer: COMMERCIAL

## 2022-08-23 VITALS
OXYGEN SATURATION: 99 % | TEMPERATURE: 96.5 F | HEART RATE: 62 BPM | BODY MASS INDEX: 36.97 KG/M2 | DIASTOLIC BLOOD PRESSURE: 63 MMHG | SYSTOLIC BLOOD PRESSURE: 138 MMHG | RESPIRATION RATE: 14 BRPM | WEIGHT: 272.93 LBS | HEIGHT: 72 IN

## 2022-08-23 DIAGNOSIS — C90.00 MULTIPLE MYELOMA NOT HAVING ACHIEVED REMISSION (HCC): Primary | ICD-10-CM

## 2022-08-23 DIAGNOSIS — D64.9 ANEMIA, UNSPECIFIED TYPE: ICD-10-CM

## 2022-08-23 PROCEDURE — 96401 CHEMO ANTI-NEOPL SQ/IM: CPT

## 2022-08-23 PROCEDURE — 96365 THER/PROPH/DIAG IV INF INIT: CPT

## 2022-08-23 RX ORDER — DEXAMETHASONE 4 MG/1
20 TABLET ORAL ONCE
Status: COMPLETED | OUTPATIENT
Start: 2022-08-23 | End: 2022-08-23

## 2022-08-23 RX ADMIN — BORTEZOMIB 3.4 MG: 3.5 INJECTION, POWDER, LYOPHILIZED, FOR SOLUTION INTRAVENOUS; SUBCUTANEOUS at 11:43

## 2022-08-23 RX ADMIN — IRON SUCROSE 200 MG: 20 INJECTION, SOLUTION INTRAVENOUS at 12:08

## 2022-08-23 RX ADMIN — DEXAMETHASONE 20 MG: 4 TABLET ORAL at 11:02

## 2022-08-23 NOTE — PROGRESS NOTES
Infusion tolerated well  PIV removed  Pt experienced an episode of large diarrhea for which he was bathed and diapered  Awaiting transport home  Oralia was called at 1300

## 2022-08-23 NOTE — PROGRESS NOTES
Rec'd [facility] in good spirits via [facility] by ambulance, c/o diarrhea managed successfully with Imodium  No diarrhea this morning in the infusion center  Pt in bedresting  Velcade inj tolerated well in lower r abd  PIV placed in R arm for new Venofer infusion today  Venofer order clarified by Dr Gerry Yu office nurse Savage Burch  Pharmacy made aware

## 2022-08-29 ENCOUNTER — ANESTHESIA EVENT (OUTPATIENT)
Dept: GASTROENTEROLOGY | Facility: HOSPITAL | Age: 72
End: 2022-08-29

## 2022-08-29 ENCOUNTER — ANESTHESIA (OUTPATIENT)
Dept: GASTROENTEROLOGY | Facility: HOSPITAL | Age: 72
End: 2022-08-29

## 2022-08-29 ENCOUNTER — HOSPITAL ENCOUNTER (OUTPATIENT)
Dept: RADIOLOGY | Facility: HOSPITAL | Age: 72
Discharge: HOME | End: 2022-08-29
Payer: COMMERCIAL

## 2022-08-29 ENCOUNTER — HOSPITAL ENCOUNTER (OUTPATIENT)
Dept: GASTROENTEROLOGY | Facility: HOSPITAL | Age: 72
Setting detail: OUTPATIENT SURGERY
Discharge: HOME | End: 2022-08-29
Attending: INTERNAL MEDICINE
Payer: COMMERCIAL

## 2022-08-29 VITALS
HEART RATE: 79 BPM | RESPIRATION RATE: 18 BRPM | HEIGHT: 72 IN | SYSTOLIC BLOOD PRESSURE: 154 MMHG | WEIGHT: 260 LBS | DIASTOLIC BLOOD PRESSURE: 67 MMHG | BODY MASS INDEX: 35.21 KG/M2 | OXYGEN SATURATION: 97 % | TEMPERATURE: 97.9 F

## 2022-08-29 DIAGNOSIS — D13.5 AMPULLARY ADENOMA: ICD-10-CM

## 2022-08-29 PROBLEM — E11.9 DIABETES MELLITUS, TYPE 2 (HCC): Status: ACTIVE | Noted: 2019-07-25

## 2022-08-29 LAB — GLUCOSE SERPL-MCNC: 116 MG/DL (ref 65–140)

## 2022-08-29 PROCEDURE — C2617 STENT, NON-COR, TEM W/O DEL: HCPCS

## 2022-08-29 PROCEDURE — 43274 ERCP DUCT STENT PLACEMENT: CPT | Performed by: INTERNAL MEDICINE

## 2022-08-29 PROCEDURE — 88305 TISSUE EXAM BY PATHOLOGIST: CPT | Performed by: SPECIALIST

## 2022-08-29 PROCEDURE — C1769 GUIDE WIRE: HCPCS

## 2022-08-29 PROCEDURE — 74328 X-RAY BILE DUCT ENDOSCOPY: CPT

## 2022-08-29 PROCEDURE — 43239 EGD BIOPSY SINGLE/MULTIPLE: CPT | Performed by: INTERNAL MEDICINE

## 2022-08-29 PROCEDURE — 82948 REAGENT STRIP/BLOOD GLUCOSE: CPT

## 2022-08-29 PROCEDURE — C2625 STENT, NON-COR, TEM W/DEL SY: HCPCS

## 2022-08-29 PROCEDURE — 43254 EGD ENDO MUCOSAL RESECTION: CPT | Performed by: INTERNAL MEDICINE

## 2022-08-29 RX ORDER — FENTANYL CITRATE 50 UG/ML
INJECTION, SOLUTION INTRAMUSCULAR; INTRAVENOUS AS NEEDED
Status: DISCONTINUED | OUTPATIENT
Start: 2022-08-29 | End: 2022-08-29

## 2022-08-29 RX ORDER — SUCCINYLCHOLINE/SOD CL,ISO/PF 100 MG/5ML
SYRINGE (ML) INTRAVENOUS AS NEEDED
Status: DISCONTINUED | OUTPATIENT
Start: 2022-08-29 | End: 2022-08-29

## 2022-08-29 RX ORDER — ONDANSETRON 2 MG/ML
INJECTION INTRAMUSCULAR; INTRAVENOUS AS NEEDED
Status: DISCONTINUED | OUTPATIENT
Start: 2022-08-29 | End: 2022-08-29

## 2022-08-29 RX ORDER — SODIUM CHLORIDE 9 MG/ML
75 INJECTION, SOLUTION INTRAVENOUS CONTINUOUS
Status: CANCELLED | OUTPATIENT
Start: 2022-09-08

## 2022-08-29 RX ORDER — ROCURONIUM BROMIDE 10 MG/ML
INJECTION, SOLUTION INTRAVENOUS AS NEEDED
Status: DISCONTINUED | OUTPATIENT
Start: 2022-08-29 | End: 2022-08-29

## 2022-08-29 RX ORDER — SODIUM CHLORIDE, SODIUM LACTATE, POTASSIUM CHLORIDE, CALCIUM CHLORIDE 600; 310; 30; 20 MG/100ML; MG/100ML; MG/100ML; MG/100ML
INJECTION, SOLUTION INTRAVENOUS CONTINUOUS PRN
Status: DISCONTINUED | OUTPATIENT
Start: 2022-08-29 | End: 2022-08-29

## 2022-08-29 RX ORDER — PROPOFOL 10 MG/ML
INJECTION, EMULSION INTRAVENOUS AS NEEDED
Status: DISCONTINUED | OUTPATIENT
Start: 2022-08-29 | End: 2022-08-29

## 2022-08-29 RX ADMIN — SODIUM CHLORIDE, SODIUM LACTATE, POTASSIUM CHLORIDE, AND CALCIUM CHLORIDE: .6; .31; .03; .02 INJECTION, SOLUTION INTRAVENOUS at 09:11

## 2022-08-29 RX ADMIN — SUGAMMADEX 260 MG: 100 INJECTION, SOLUTION INTRAVENOUS at 10:28

## 2022-08-29 RX ADMIN — Medication 100 MG: at 09:18

## 2022-08-29 RX ADMIN — NOREPINEPHRINE BITARTRATE 5 MCG/MIN: 1 INJECTION INTRAVENOUS at 09:21

## 2022-08-29 RX ADMIN — ONDANSETRON 4 MG: 2 INJECTION INTRAMUSCULAR; INTRAVENOUS at 09:18

## 2022-08-29 RX ADMIN — FENTANYL CITRATE 50 MCG: 50 INJECTION INTRAMUSCULAR; INTRAVENOUS at 09:18

## 2022-08-29 RX ADMIN — ROCURONIUM BROMIDE 20 MG: 50 INJECTION INTRAVENOUS at 09:26

## 2022-08-29 RX ADMIN — IOHEXOL 14 ML: 240 INJECTION, SOLUTION INTRATHECAL; INTRAVASCULAR; INTRAVENOUS; ORAL at 09:45

## 2022-08-29 RX ADMIN — PROPOFOL 150 MG: 10 INJECTION, EMULSION INTRAVENOUS at 09:18

## 2022-08-29 RX ADMIN — FENTANYL CITRATE 50 MCG: 50 INJECTION INTRAMUSCULAR; INTRAVENOUS at 09:32

## 2022-08-29 RX ADMIN — INDOMETHACIN 100 MG: 50 SUPPOSITORY RECTAL at 10:17

## 2022-08-29 NOTE — ANESTHESIA POSTPROCEDURE EVALUATION
Post-Op Assessment Note    CV Status:  Stable  Pain Score: 0    Pain management: adequate     Mental Status:  Arousable   Hydration Status:  Stable   PONV Controlled:  None   Airway Patency:  Patent      Post Op Vitals Reviewed: Yes      Staff: Anesthesiologist, CRNA         No complications documented      BP   132/66   Temp   97 9   Pulse 86   Resp      SpO2   98 room air

## 2022-08-29 NOTE — ANESTHESIA PREPROCEDURE EVALUATION
Procedure:  ERCP    Relevant Problems   CARDIO   (+) Acute on chronic combined systolic and diastolic congestive heart failure (HCC)   (+) Chronic atrial fibrillation (HCC)   (+) Essential hypertension   (+) Hyperlipidemia   (+) Peripheral vascular disease (HCC)   (+) Type 2 acute myocardial infarction (HCC)   (+) Venous hypertension, chronic, with ulcer and inflammation, right (HCC)   (+) Venous insufficiency (chronic) (peripheral)      ENDO   (+) Diabetes mellitus, type 2 (HCC)      GI/HEPATIC   (+) GERD (gastroesophageal reflux disease)   (+) Hiatal hernia      /RENAL   (+) DORA (acute kidney injury) (HCC)   (+) CKD (chronic kidney disease)   (+) Hypertensive chronic kidney disease w stg 1-4/unsp chr kdny   (+) Stage 3b chronic kidney disease (HCC)      HEMATOLOGY   (+) Anemia   (+) Multiple myeloma (HCC)      MUSCULOSKELETAL   (+) Gout      NEURO/PSYCH   (+) Diabetes, polyneuropathy (HCC)      PULMONARY   (+) Chronic obstructive pulmonary disease, unspecified ([facility])   (+) Moderate persistent asthma without complication   (+) NALLELY (obstructive sleep apnea)   (+) SOB (shortness of breath)      Cardiovascular and Mediastinum   (+) Cardiomyopathy ([address] )      Endocrine   (+) Diabetes mellitus type 2, uncontrolled   (+) Diabetic foot ulcer ([facility] )      Other   (+) Above knee amputation of left lower extremity ([facility])   (+) Lymphedema   (+) Morbid obesity ([facility] )      Preop glucose 114    EKG 8/8/2022:  Atrial fibrillation with slow ventricular response  Non-specific intra-ventricular conduction block  Cannot rule out Septal infarct (cited on or before 07-AUG-2022)  Abnormal ECG  When compared with ECG of 07-AUG-2022 19:15, (unconfirmed)  No significant change was found    Nuclear Stress 10/2021:    Stress ECG: No ST deviation is noted  Arrhythmias during stress: atrial fibrillation  Arrhythmias during recovery: atrial fibrillation  The ECG was not diagnostic after regadenoson infusion      Stress Function: Left ventricular function post-stress is abnormal  Global function is mildly to moderately reduced  There were no regional wall motion abnormalities during stress  Post-stress ejection fraction is 36 %    Stress Combined Conclusion: There is image artifact, without diagnostic evidence for perfusion abnormality    Perfusion Defect Conclusion: The rest end diastolic cavity size is enlarged  The rest end systolic cavity size is enlarged  The stress end diastolic cavity size is dilated    Perfusion: There is a left ventricular perfusion defect that is small in size present in the basal to mid inferior location(s) that is paradoxical     TTE 10/5/2021:  LEFT VENTRICLE:  The ventricle was dilated  Systolic function was mildly reduced  Ejection fraction was estimated to be 45 %  There was mild diffuse hypokinesis with regional variations  Wall thickness was mildly increased  The changes were consistent with eccentric hypertrophy  Doppler parameters were consistent with high ventricular filling pressure      RIGHT VENTRICLE:  The ventricle was moderately dilated  Systolic function was moderately reduced      LEFT ATRIUM:  The atrium was moderately dilated      RIGHT ATRIUM:  The atrium was mildly dilated      MITRAL VALVE:  There was mild annular calcification  There was mild regurgitation      AORTA:  There was mild dilatation of the ascending aorta (4 2 cm)          Lab Results   Component Value Date    WBC 8 22 08/10/2022    HGB 8 6 (L) 08/10/2022    HCT 28 9 (L) 08/10/2022    MCV 88 08/10/2022     08/10/2022     Lab Results   Component Value Date    SODIUM 144 08/10/2022    K 3 5 08/10/2022     (H) 08/10/2022    CO2 22 08/10/2022    BUN 44 (H) 08/10/2022    CREATININE 2 96 (H) 08/10/2022    GLUC 122 08/10/2022    CALCIUM 8 3 08/10/2022     Lab Results   Component Value Date    INR 3 39 (H) 08/10/2022    INR 3 53 (H) 08/09/2022    INR 3 16 (H) 08/08/2022    PROTIME 35 8 (H) 08/10/2022    PROTIME 37 0 (H) 08/09/2022    PROTIME 33 9 (H) 08/08/2022     Lab Results   Component Value Date    HGBA1C 6 3 (H) 08/05/2022          Physical Exam    Airway  Comment: Overall poor dentition, diseased teeth, patient denies loose teeth           Dental       Cardiovascular  Cardiovascular exam normal    Pulmonary  Pulmonary exam normal     Other Findings        Anesthesia Plan  ASA Score- 4     Anesthesia Type- general with ASA Monitors  Additional Monitors:   Airway Plan: ETT  Comment: Discussed with patient that given pre-existing comorbidities, he is at increased risk for periprocedural complications, particularly cardiac and respiratory, including MI, worsening HF, CVA, respiratory failure, multiorgan failure and death  Patient expresses understanding and consents to proposed anesthesia plan          Plan Factors-Exercise tolerance (METS): <4 METS  Chart reviewed  EKG reviewed  Existing labs reviewed  Patient summary reviewed  Induction- intravenous and rapid sequence induction  Postoperative Plan-   Planned trial extubation    Informed Consent- Anesthetic plan and risks discussed with patient  I personally reviewed this patient with the CRNA  Discussed and agreed on the Anesthesia Plan with the CRNA  Radha Ling

## 2022-08-30 ENCOUNTER — HOSPITAL ENCOUNTER (OUTPATIENT)
Dept: INFUSION CENTER | Facility: HOSPITAL | Age: 72
Discharge: HOME | End: 2022-08-30
Attending: INTERNAL MEDICINE
Payer: COMMERCIAL

## 2022-08-30 VITALS
HEART RATE: 57 BPM | SYSTOLIC BLOOD PRESSURE: 128 MMHG | RESPIRATION RATE: 16 BRPM | TEMPERATURE: 96.9 F | HEIGHT: 72 IN | OXYGEN SATURATION: 98 % | DIASTOLIC BLOOD PRESSURE: 55 MMHG | BODY MASS INDEX: 35.21 KG/M2 | WEIGHT: 260 LBS

## 2022-08-30 DIAGNOSIS — C90.00 MULTIPLE MYELOMA NOT HAVING ACHIEVED REMISSION (HCC): Primary | ICD-10-CM

## 2022-08-30 PROCEDURE — 96365 THER/PROPH/DIAG IV INF INIT: CPT

## 2022-08-30 PROCEDURE — 96401 CHEMO ANTI-NEOPL SQ/IM: CPT

## 2022-08-30 RX ORDER — DEXAMETHASONE 4 MG/1
20 TABLET ORAL ONCE
Status: COMPLETED | OUTPATIENT
Start: 2022-08-30 | End: 2022-08-30

## 2022-08-30 RX ADMIN — BORTEZOMIB 3.2 MG: 3.5 INJECTION, POWDER, LYOPHILIZED, FOR SOLUTION INTRAVENOUS; SUBCUTANEOUS at 11:09

## 2022-08-30 RX ADMIN — DEXAMETHASONE 20 MG: 4 TABLET ORAL at 11:03

## 2022-08-30 RX ADMIN — IRON SUCROSE 200 MG: 20 INJECTION, SOLUTION INTRAVENOUS at 11:05

## 2022-08-30 NOTE — PROGRESS NOTES
Rec'd pt via [facility] on stretcher  No new complaints offered today  Pt's previous bout of diarrhea has resolved  PIV obtained with some difficulty  Venofer infused without issues  Velcade inj to lower R abdomen tolerated well  Pt resting comfortably while waiting for [facility] transport back to facility

## 2022-09-01 PROCEDURE — 88305 TISSUE EXAM BY PATHOLOGIST: CPT | Performed by: SPECIALIST

## 2022-09-06 ENCOUNTER — HOSPITAL ENCOUNTER (OUTPATIENT)
Dept: INFUSION CENTER | Facility: HOSPITAL | Age: 72
Discharge: HOME | End: 2022-09-06
Attending: INTERNAL MEDICINE
Payer: COMMERCIAL

## 2022-09-06 VITALS
DIASTOLIC BLOOD PRESSURE: 75 MMHG | WEIGHT: 257.94 LBS | HEIGHT: 72 IN | HEART RATE: 52 BPM | TEMPERATURE: 98 F | SYSTOLIC BLOOD PRESSURE: 145 MMHG | OXYGEN SATURATION: 99 % | BODY MASS INDEX: 34.94 KG/M2 | RESPIRATION RATE: 16 BRPM

## 2022-09-06 DIAGNOSIS — C90.00 MULTIPLE MYELOMA NOT HAVING ACHIEVED REMISSION (HCC): Primary | ICD-10-CM

## 2022-09-06 PROCEDURE — 96401 CHEMO ANTI-NEOPL SQ/IM: CPT

## 2022-09-06 RX ORDER — DEXAMETHASONE 4 MG/1
20 TABLET ORAL ONCE
Status: COMPLETED | OUTPATIENT
Start: 2022-09-06 | End: 2022-09-06

## 2022-09-06 RX ADMIN — BORTEZOMIB 3.2 MG: 3.5 INJECTION, POWDER, LYOPHILIZED, FOR SOLUTION INTRAVENOUS; SUBCUTANEOUS at 11:57

## 2022-09-06 RX ADMIN — DEXAMETHASONE 20 MG: 4 TABLET ORAL at 11:06

## 2022-09-06 NOTE — PROGRESS NOTES
Pt arrived via stretcher from [facility] for Velcade injection  OK to use labs from 8/28  Decadron premed given, Velcade given SQ Rt side of abdomen  Dsd applied  Pt cristhian well  AVS sent with pt  Tamar reviewed  Disch via [facility] stretcher to [place]

## 2022-09-08 ENCOUNTER — HOSPITAL ENCOUNTER (OUTPATIENT)
Dept: CT IMAGING | Facility: HOSPITAL | Age: 72
Discharge: HOME | End: 2022-09-08
Attending: INTERNAL MEDICINE
Payer: COMMERCIAL

## 2022-09-08 VITALS
DIASTOLIC BLOOD PRESSURE: 58 MMHG | RESPIRATION RATE: 12 BRPM | SYSTOLIC BLOOD PRESSURE: 86 MMHG | OXYGEN SATURATION: 96 % | TEMPERATURE: 98 F | HEART RATE: 54 BPM

## 2022-09-08 DIAGNOSIS — C90.00 MULTIPLE MYELOMA NOT HAVING ACHIEVED REMISSION (HCC): ICD-10-CM

## 2022-09-08 PROCEDURE — 88311 DECALCIFY TISSUE: CPT | Performed by: PATHOLOGY

## 2022-09-08 PROCEDURE — 88184 FLOWCYTOMETRY/ TC 1 MARKER: CPT | Performed by: INTERNAL MEDICINE

## 2022-09-08 PROCEDURE — 77012 CT SCAN FOR NEEDLE BIOPSY: CPT | Performed by: RADIOLOGY

## 2022-09-08 PROCEDURE — 38221 DX BONE MARROW BIOPSIES: CPT

## 2022-09-08 PROCEDURE — 99152 MOD SED SAME PHYS/QHP 5/>YRS: CPT | Performed by: RADIOLOGY

## 2022-09-08 PROCEDURE — 88305 TISSUE EXAM BY PATHOLOGIST: CPT | Performed by: PATHOLOGY

## 2022-09-08 PROCEDURE — 38222 DX BONE MARROW BX & ASPIR: CPT | Performed by: RADIOLOGY

## 2022-09-08 PROCEDURE — 88342 IMHCHEM/IMCYTCHM 1ST ANTB: CPT | Performed by: PATHOLOGY

## 2022-09-08 PROCEDURE — 88262 CHROMOSOME ANALYSIS 15-20: CPT

## 2022-09-08 PROCEDURE — 99152 MOD SED SAME PHYS/QHP 5/>YRS: CPT

## 2022-09-08 PROCEDURE — 88360 TUMOR IMMUNOHISTOCHEM/MANUAL: CPT | Performed by: PATHOLOGY

## 2022-09-08 PROCEDURE — 88364 INSITU HYBRIDIZATION (FISH): CPT | Performed by: PATHOLOGY

## 2022-09-08 PROCEDURE — 38222 DX BONE MARROW BX & ASPIR: CPT

## 2022-09-08 PROCEDURE — 77012 CT SCAN FOR NEEDLE BIOPSY: CPT

## 2022-09-08 PROCEDURE — 88365 INSITU HYBRIDIZATION (FISH): CPT | Performed by: PATHOLOGY

## 2022-09-08 PROCEDURE — 88341 IMHCHEM/IMCYTCHM EA ADD ANTB: CPT | Performed by: PATHOLOGY

## 2022-09-08 PROCEDURE — 88185 FLOWCYTOMETRY/TC ADD-ON: CPT

## 2022-09-08 PROCEDURE — 88313 SPECIAL STAINS GROUP 2: CPT | Performed by: PATHOLOGY

## 2022-09-08 PROCEDURE — 85097 BONE MARROW INTERPRETATION: CPT | Performed by: PATHOLOGY

## 2022-09-08 RX ORDER — SODIUM CHLORIDE 9 MG/ML
75 INJECTION, SOLUTION INTRAVENOUS CONTINUOUS
Status: DISCONTINUED | OUTPATIENT
Start: 2022-09-08 | End: 2022-09-09 | Stop reason: HOSPADM

## 2022-09-08 RX ORDER — MIDAZOLAM HYDROCHLORIDE 2 MG/2ML
INJECTION, SOLUTION INTRAMUSCULAR; INTRAVENOUS CODE/TRAUMA/SEDATION MEDICATION
Status: COMPLETED | OUTPATIENT
Start: 2022-09-08 | End: 2022-09-08

## 2022-09-08 RX ORDER — FENTANYL CITRATE 50 UG/ML
INJECTION, SOLUTION INTRAMUSCULAR; INTRAVENOUS CODE/TRAUMA/SEDATION MEDICATION
Status: COMPLETED | OUTPATIENT
Start: 2022-09-08 | End: 2022-09-08

## 2022-09-08 RX ORDER — ACETAMINOPHEN 325 MG/1
650 TABLET ORAL EVERY 4 HOURS PRN
Status: DISCONTINUED | OUTPATIENT
Start: 2022-09-08 | End: 2022-09-09 | Stop reason: HOSPADM

## 2022-09-08 RX ORDER — LIDOCAINE HYDROCHLORIDE 10 MG/ML
INJECTION, SOLUTION EPIDURAL; INFILTRATION; INTRACAUDAL; PERINEURAL CODE/TRAUMA/SEDATION MEDICATION
Status: COMPLETED | OUTPATIENT
Start: 2022-09-08 | End: 2022-09-08

## 2022-09-08 RX ADMIN — FENTANYL CITRATE 100 MCG: 50 INJECTION INTRAMUSCULAR; INTRAVENOUS at 09:00

## 2022-09-08 RX ADMIN — LIDOCAINE HYDROCHLORIDE 8 ML: 10 INJECTION, SOLUTION EPIDURAL; INFILTRATION; INTRACAUDAL; PERINEURAL at 09:01

## 2022-09-08 RX ADMIN — MIDAZOLAM 2 MG: 1 INJECTION INTRAMUSCULAR; INTRAVENOUS at 08:59

## 2022-09-08 NOTE — DISCHARGE INSTRUCTIONS
Bone Marrow Biopsy     WHAT YOU NEED TO KNOW:   A bone marrow biopsy is a procedure to remove a small amount of bone marrow from your bone  Bone marrow is the soft tissue inside your bone that helps to make blood cells  The sample is tested for disease or infection  DISCHARGE INSTRUCTIONS:     1  Limit your activities day of biopsy as directed by your doctor  2  Use medication as ordered  3  Return to your normal diet  Small sips of flat soda will help with nausea  4  Remove band-aid or dressing 24 hours after procedure  Contact Interventional Radiology at [phone] [place] PATIENTS: Contact Interventional Radiology at [phone]) Elbert Dos Santos PATIENTS: Contact Interventional Radiology at [phone]) if:    1  Difficulty breathing, nausea or vomiting  2  Chills or fever above 101 F     3  Pain at biopsy site not relieved by medication  4  Develop any redness, swelling, heat, unusual drainage, heavy bruising or bleeding from biopsy site

## 2022-09-08 NOTE — BRIEF OP NOTE (RAD/CATH)
INTERVENTIONAL RADIOLOGY PROCEDURE NOTE    Date: 9/8/2022    Procedure: IR BIOPSY BONE MARROW    Preoperative diagnosis:   1  Multiple myeloma not having achieved remission (HCC)         Postoperative diagnosis: Same  Surgeon: Abdullahi Goel MD     Assistant: None  No qualified resident was available  Blood loss: 1 mL    Specimens: 7 mL bone marrow aspirate and 1 core needle sample  Findings: Successful right posterior iliac spine bone marrow biopsy  Complications: None immediate      Anesthesia: conscious sedation and local

## 2022-09-08 NOTE — SEDATION DOCUMENTATION
Rohit completed  Band aid to site  Patient tolerated well and hemodynamically stable for transfer to PACU  Report and care assumed by primary RN

## 2022-09-08 NOTE — H&P
Interventional Radiology  History and Physical 9/8/2022     Cira Severe   1950   [id number]    Assessment/Plan:  24-year-old male with history of multiple myeloma returns for bone marrow biopsy  Problem List Items Addressed This Visit        Other    Multiple myeloma ([facility] ) (Chronic)    Relevant Orders    IR biopsy bone marrow             Subjective:     Patient ID: Cira Severe is a 73 y o  male  History of Present Illness  Patient with history of multiple myeloma returns for bone marrow biopsy  Review of Systems      Past Medical History:   Diagnosis Date    Cancer [facility])     [facility]est[facility]     Chronic kidney disease     COPD (chronic obstructive pulmonary disease) ([facility] )     COVID-19     Decubitus ulcer of heel     LAST ASSESSED [id number]    Diabetes mellitus (HCC)     History of varicose veins     Hypertension     Intermittent claudication (HCC)     Neuropathy     Seasonal allergies         Past Surgical History:   Procedure Laterality Date    AMPUTATION ABOVE KNEE (AKA) Left 7/31/2019    Procedure: AMPUTATION ABOVE KNEE (AKA);   Surgeon: Andi Lundborg, MD;  Location:  MAIN OR;  Service: General    ANGIOPLASTY      W/ FLUOROSC ANGIOGRAPGY PERIPHERAL ARTERY ADDITIONAL  LAST ASSESSED [id number]    ANGIOPLASTY / STENTING FEMORAL      TANSCATH INTRAVASCULAR STENT PLACEMENT PERCUTANEOUS FEMORAL     COLONOSCOPY  2010    CT BONE MARROW BIOPSY AND ASPIRATION  8/9/2019    FULL THICKNESS SKIN GRAFT      TENDON REPAIR      TOE AMPUTATION Left 12/27/2018    Procedure: AMPUTATION left 4th TOE;  Surgeon: Verl Soulier, DPM;  Location:  MAIN OR;  Service: Podiatry        Social History     Tobacco Use   Smoking Status Never Smoker   Smokeless Tobacco Never Used        Social History     Substance and Sexual Activity   Alcohol Use Not Currently        Social History     Substance and Sexual Activity   Drug Use Not Currently        Allergies Allergen Reactions    Latex Rash       Current Outpatient Medications   Medication Sig Dispense Refill    acetaminophen (TYLENOL) 500 mg tablet Take 1 tablet (500 mg total) by mouth every 6 (six) hours as needed for mild pain, headaches or fever 90 tablet 1    albuterol (PROVENTIL HFA,VENTOLIN HFA) 90 mcg/act inhaler Inhale 2 puffs every 6 (six) hours as needed for wheezing 1 Inhaler 0    Alcohol Swabs (ALCOHOL PREP) 70 % PADS   0    allopurinol (ZYLOPRIM) 300 mg tablet TAKE ONE TABLET BY MOUTH DAILY (Patient taking differently: 300 mg) 30 tablet 5    ammonium lactate (LAC-HYDRIN) 12 % lotion APPLY TO BLE TOPICALLY DAILY 400 g 2    atorvastatin (LIPITOR) 40 mg tablet Take 1 tablet (40 mg total) by mouth daily after dinner 30 tablet 0    BD AUTOSHIELD DUO 30G X 5 MM MISC USE AS DIRECTED WITH INSULIN FOUR TIMES A  each 3    BD INSULIN SYRINGE U/F 31G X 5/16" 0 5 ML MISC USE AS DIRECTED WITH NOVOLIN 70/30  0    BD PEN NEEDLE HILARIO U/F 32G X 4 MM MISC by Other route 3 (three) times a day 300 each 1    bortezomib (VELCADE) Infuse into a venous catheter once   (Patient not taking: No sig reported)      cholestyramine sugar free (QUESTRAN LIGHT) 4 g packet Take 1 packet (4 g total) by mouth 2 (two) times a day 30 packet 0    clotrimazole (LOTRIMIN) 1 % cream APPLY TO BUTTOCK 2 TIMES DAILY 45 g 3    Easy Touch Safety Lancets 28G MISC USE 4 TIMES DAILY TO TEST BLOOD SUGAR 100 each 5    fluticasone (FLONASE) 50 mcg/act nasal spray       fluticasone-salmeterol (ADVAIR DISKUS, WIXELA INHUB) 250-50 mcg/dose inhaler Inhale 1 puff 2 (two) times a day Rinse mouth after use   1 Inhaler 5    Insulin Glargine Solostar (Lantus SoloStar) 100 UNIT/ML SOPN Inject 10 Units under the skin daily at bedtime 15 mL 0    liraglutide (Victoza) injection Inject 1 8 mg under the skin daily      loperamide (IMODIUM) 2 mg capsule Take 2 mg by mouth 2 (two) times a day as needed for diarrhea      multivitamin-minerals therapeutic (THERA-M)       NOVOLOG FLEXPEN 100 units/mL SOPN INJ 5U BEFORE MEALS AND PER SS <250=NO CALL 250-300=5U,301-350= 10U,351-400=15U,401-450=20U>451 CALL FOR ORDERS UP TO 4X PER DAY 15 mL 5    nystatin (MYCOSTATIN) powder Apply 229,680 application topically 4 (four) times a day      omeprazole (PriLOSEC) 40 MG capsule Take 1 capsule (40 mg total) by mouth daily Half an hour prior to breakfast 30 capsule 2    ondansetron (ZOFRAN) 4 mg tablet ondansetron HCl 4 mg tablet   TAKE 1 TABLET BY MOUTH EVERY 8 HOURS AS NEEDED      potassium chloride (K-DUR,KLOR-CON) 20 mEq tablet Take 2 tablets (40 mEq total) by mouth daily  0    torsemide (DEMADEX) 20 mg tablet Take 2 tablets (40 mg total) by mouth 2 (two) times a day  0     Current Facility-Administered Medications   Medication Dose Route Frequency Provider Last Rate Last Admin    sodium chloride 0 9 % infusion  75 mL/hr Intravenous Continuous Georgina Basurto MD              Objective:    Vitals:    09/08/22 0736   BP: 134/60   Pulse: 58   Resp: 19   Temp: 97 6 °F (36 4 °C)   TempSrc: Oral   SpO2: 95%        Physical Exam  Constitutional:       Appearance: Normal appearance  Pulmonary:      Effort: Pulmonary effort is normal            Lab Results   Component Value Date     (H) 12/21/2015      Lab Results   Component Value Date    WBC 8 22 08/10/2022    HGB 8 6 (L) 08/10/2022    HCT 28 9 (L) 08/10/2022    MCV 88 08/10/2022     08/10/2022     Lab Results   Component Value Date    INR 3 39 (H) 08/10/2022    INR 3 53 (H) 08/09/2022    INR 3 16 (H) 08/08/2022    PROTIME 35 8 (H) 08/10/2022    PROTIME 37 0 (H) 08/09/2022    PROTIME 33 9 (H) 08/08/2022     Lab Results   Component Value Date    PTT 53 (H) 08/02/2022         I have personally reviewed pertinent imaging and laboratory results  Code Status: Prior  Advance Directive and Living Will:      Power of :    POLST:      This text is generated with voice recognition software   There may be translation, syntax,  or grammatical errors  If you have any questions, please contact the dictating provider

## 2022-09-08 NOTE — PROGRESS NOTES
Progress Note - Nate Grit 1950, 81 y o  male MRN: [id number]    Unit/Bed#: [facility] 821-01 Encounter: [id number]    Primary Care Provider: Gail James MD   Date and time admitted to hospital: 6/25/2020 11:43 AM        Sepsis due to COVID-19 pneumonia [facility])  Assessment & Plan  · Patient presenting with tachycardia, tachypnea, hypoxia and confirm COVID-19 pneumonia  · Now improved    CKD (chronic kidney disease)  Assessment & Plan  · Baseline creatinine around 1 2   · Management as above    Chronic obstructive pulmonary disease, unspecified (HCC)  Assessment & Plan  · Stable without any wheezing  · Continue home inhalers  Type 2 acute myocardial infarction [facility])  Assessment & Plan  · Likely due to acute respiratory failure from COVID-19 pneumonia  · Patient denies any chest pain or shortness of breath  · Will continue warfarin for anticoagulation given his lack of symptoms at this time  · Outpatient cardiac evaluation when stable    Chronic diastolic congestive heart failure [facility])  Assessment & Plan  Wt Readings from Last 3 Encounters:   07/13/20 (!) 139 kg (305 lb 12 5 oz)   06/15/20 (!) 155 kg (341 lb 4 4 oz)   06/08/20 (!) 154 kg (340 lb 9 8 oz)     · Volume status is adequate  · Monitor intake and output  · Monitor daily weights  · Continue torsemide    Chronic atrial fibrillation (HCC)  Assessment & Plan  · Rate controlled with metoprolol - decreased to 12 5 b i d  due to heart rate in 40s and 50s  · Warfarin for anticoagulation  · INR should be < 3, started coumadin 5 mg x 2 datys, INR 2 1 <- 2 4  Give 7 5 mg today    Lab Results   Component Value Date    INR 2 10 (H) 07/13/2020    INR 2 46 (H) 07/12/2020    INR 3 03 (H) 07/10/2020    PROTIME 23 5 (H) 07/13/2020    PROTIME 26 5 (H) 07/12/2020    PROTIME 31 2 (H) 07/10/2020     ·     Essential hypertension  Assessment & Plan  · Well controlled    · Continue metoprolol   · Continue torsemide    Diabetes mellitus type 2, uncontrolled [facility])  Assessment & Plan  Lab Results   Component Value Date    HGBA1C 8 8 (H) 06/25/2020       Recent Labs     07/12/20  2047 07/13/20  0754 07/13/20  1134 07/13/20  1616   POCGLU 177* 122 180* 149*       Blood Sugar Average: Last 72 hrs:  (P) 142 [id number]   · Uncontrolled due to IV steroid use  · now off steroids & low BS   · Diabetic diet  · Will stop the Humalog and  Cont Lantus to 30 units HS  · Continue sliding scale coverage    * Acute respiratory failure with hypoxia due to Pneumonia due to COVID-19 virus  Assessment & Plan  · Pt presents with 1 week symptoms at [facility]  · Upon presentation fell in moderate severity for COVID  · S/p 8-9 days of ceftriaxone and doxycycline  · Was treated with IV steroids for 10 days  · Continue vitamin-C, vitamin-D, zinc supplementation  · Wean O2 as tolerated  · Continue oral torsemide  · Pulm following, sputum cx ordered  · Curbside discussed with Infectious Disease, no indication for any other treatment regimen at this point of time at this level of oxygen  · CT chest: "Bilateral groundglass opacities are fairly diffuse in a heterogeneous pattern  Some areas of consolidation are noted  The findings are suspicious for pneumonia    Atypical organisms are possible including viral In the setting of clinically suspected/proven COVID-19, the above lung parenchymal findings on CT indicate intermediate confidence Follow-up is suggested in 2-3 months time to ensure resolution when the patient is no longer infectious "  · Pulmonary recommended to check sputum cultures rule out any superimposed bacterial infection also continue to titrate his O2  · F/u sputum results - mixed louann  · O2 requirement significantly improved  · Cleared for discharge            [facility] Internal Medicine Progress Note  Patient: Delroy Rojas 81 y o  male   MRN: [id number]  PCP: Lorene Esquivel MD  Unit/Bed#: [facility] 821-01 Encounter: [id number]  Date Of Visit: 20    Assessment:    Principal Problem:    Acute respiratory failure with hypoxia due to Pneumonia due to COVID-19 virus  Active Problems:    Diabetes mellitus type 2, uncontrolled (HCC)    Essential hypertension    Chronic atrial fibrillation (HCC)    Chronic diastolic congestive heart failure (HCC)    Type 2 acute myocardial infarction (HCC)    Chronic obstructive pulmonary disease, unspecified (HCC)    CKD (chronic kidney disease)    Sepsis due to COVID-19 pneumonia (HCC)      Plan:         VTE Pharmacologic Prophylaxis:   Pharmacologic: Warfarin (Coumadin)  Mechanical VTE Prophylaxis in Place: Yes    Patient Centered Rounds: I have performed bedside rounds with nursing staff today  Discussions with Specialists or Other Care Team Provider:     Education and Discussions with Family / Patient: patient    Time Spent for Care: 30 minutes  More than 50% of total time spent on counseling and coordination of care as described above  Current Length of Stay: 18 day(s)    Current Patient Status: Inpatient   Certification Statement: The patient will continue to require additional inpatient hospital stay due to pneding ins auth    Discharge Plan / Estimated Discharge Date:     Code Status: Level 1 - Full Code      Subjective:   Feels fine    Objective:     Vitals:   Temp (24hrs), Av 9 °F (37 2 °C), Min:97 7 °F (36 5 °C), Max:99 8 °F (37 7 °C)    Temp:  [97 7 °F (36 5 °C)-99 8 °F (37 7 °C)] 99 3 °F (37 4 °C)  HR:  [42-68] 68  Resp:  [18] 18  BP: (112-143)/(60-87) 143/78  SpO2:  [80 %-92 %] 90 %  Body mass index is 38 22 kg/m²  Input and Output Summary (last 24 hours): Intake/Output Summary (Last 24 hours) at 2020 1808  Last data filed at 2020 1430  Gross per 24 hour   Intake 740 ml   Output 1250 ml   Net -510 ml       Physical Exam:     Physical Exam   Constitutional: He is oriented to person, place, and time  He appears well-developed and well-nourished     HENT:   Head: Normocephalic and no complications atraumatic  Eyes: Conjunctivae are normal    Cardiovascular: Normal rate and regular rhythm  Exam reveals no friction rub  No murmur heard  Pulmonary/Chest: Effort normal  No stridor  No respiratory distress  Abdominal: Soft  He exhibits no distension  There is no tenderness  Neurological: He is alert and oriented to person, place, and time  Vitals reviewed  Additional Data:     Labs:    Results from last 7 days   Lab Units 07/07/20  0607   WBC Thousand/uL 6 37   HEMOGLOBIN g/dL 13 0   HEMATOCRIT % 41 4   PLATELETS Thousands/uL 248     Results from last 7 days   Lab Units 07/12/20  0557   POTASSIUM mmol/L 3 2*   CHLORIDE mmol/L 102   CO2 mmol/L 28   BUN mg/dL 28*   CREATININE mg/dL 1 12   CALCIUM mg/dL 8 3     Results from last 7 days   Lab Units 07/13/20  0538   INR  2 10*       * I Have Reviewed All Lab Data Listed Above  * Additional Pertinent Lab Tests Reviewed:  All Labs Within Last 24 Hours Reviewed    Imaging:    Imaging Reports Reviewed Today Include:   Imaging Personally Reviewed by Myself Includes:      Recent Cultures (last 7 days):     Results from last 7 days   Lab Units 07/11/20  0955   SPUTUM CULTURE  4+ Growth of   1+ Growth of    GRAM STAIN RESULT  Rare Epithelial cells per low power field*  1+ Polys*  2+ Gram positive cocci in pairs*  2+ Gram negative rods*       Last 24 Hours Medication List:     Current Facility-Administered Medications:  acetaminophen 650 mg Oral Q6H PRN Christine Lewis MD   albuterol 2 puff Inhalation Q6H PRN Christine Lewsi MD   atorvastatin 40 mg Oral After Ja Hearing, MD   bisacodyl 10 mg Rectal Daily PRN Christine Lewis MD   cholecalciferol 2,000 Units Oral Daily Christine Lewis MD   clotrimazole  Topical Q12H [address] Christine Lewis MD   fluticasone-vilanterol 1 puff Inhalation Daily Christine Lewis MD   insulin glargine 30 Units Subcutaneous HS Omar Saucedo MD   insulin lispro 1-5 Units Subcutaneous HS Emily HOFFMAN Gricelda Brenner PA-C   insulin lispro 2-12 Units Subcutaneous TID AC Vanesa Galloway MD   magnesium hydroxide 30 mL Oral Daily PRN Vanesa Galloway MD   melatonin 6 mg Oral HS Tuesday M [place]   metoprolol tartrate 12 5 mg Oral Q12H Anastasiia Avila MD   KHADRA ANTIFUNGAL 1 application Topical TID Yina Riley MD   multivitamin-minerals 1 tablet Oral Daily Vanesa Galloway MD   senna-docusate sodium 1 tablet Oral BID Anastasiia Avila MD   torsemide 20 mg Oral BID (diuretic) Yina Riley MD   warfarin 7 5 mg Oral Once (warfarin) Anastasiia Avila MD        Today, Patient Was Seen By: Anastasiia Avila MD    ** Please Note: This note has been constructed using a voice recognition system   ** no complications

## 2022-09-12 ENCOUNTER — TELEPHONE (OUTPATIENT)
Dept: INTERVENTIONAL RADIOLOGY/VASCULAR | Facility: CLINIC | Age: 72
End: 2022-09-12

## 2022-09-12 ENCOUNTER — HOSPITAL ENCOUNTER (INPATIENT)
Facility: HOSPITAL | Age: 72
LOS: 22 days | Discharge: NURSING, REHAB OR LONG-TERM CARE | DRG: 674 | End: 2022-10-04
Attending: EMERGENCY MEDICINE | Admitting: INTERNAL MEDICINE
Payer: COMMERCIAL

## 2022-09-12 ENCOUNTER — APPOINTMENT (EMERGENCY)
Dept: CT IMAGING | Facility: HOSPITAL | Age: 72
DRG: 674 | End: 2022-09-12
Payer: COMMERCIAL

## 2022-09-12 ENCOUNTER — APPOINTMENT (OUTPATIENT)
Dept: RADIOLOGY | Facility: HOSPITAL | Age: 72
DRG: 674 | End: 2022-09-12
Payer: COMMERCIAL

## 2022-09-12 DIAGNOSIS — E87.5 HYPERKALEMIA: ICD-10-CM

## 2022-09-12 DIAGNOSIS — D64.9 ANEMIA, UNSPECIFIED TYPE: ICD-10-CM

## 2022-09-12 DIAGNOSIS — K81.1 CHOLECYSTITIS, CHRONIC: ICD-10-CM

## 2022-09-12 DIAGNOSIS — K57.92 ACUTE DIVERTICULITIS: ICD-10-CM

## 2022-09-12 DIAGNOSIS — C90.00 MULTIPLE MYELOMA NOT HAVING ACHIEVED REMISSION (HCC): ICD-10-CM

## 2022-09-12 DIAGNOSIS — R10.9 ABDOMINAL PAIN: ICD-10-CM

## 2022-09-12 DIAGNOSIS — R19.7 NAUSEA VOMITING AND DIARRHEA: ICD-10-CM

## 2022-09-12 DIAGNOSIS — I48.91 ATRIAL FIBRILLATION (HCC): ICD-10-CM

## 2022-09-12 DIAGNOSIS — K80.20 CHOLELITHIASIS: ICD-10-CM

## 2022-09-12 DIAGNOSIS — K81.0 ACUTE CHOLECYSTITIS: ICD-10-CM

## 2022-09-12 DIAGNOSIS — R11.2 NAUSEA AND VOMITING, UNSPECIFIED VOMITING TYPE: ICD-10-CM

## 2022-09-12 DIAGNOSIS — N17.9 AKI (ACUTE KIDNEY INJURY) (HCC): ICD-10-CM

## 2022-09-12 DIAGNOSIS — D72.829 LEUKOCYTOSIS: ICD-10-CM

## 2022-09-12 DIAGNOSIS — R11.2 NAUSEA VOMITING AND DIARRHEA: ICD-10-CM

## 2022-09-12 DIAGNOSIS — K57.92 DIVERTICULITIS: ICD-10-CM

## 2022-09-12 DIAGNOSIS — N17.9 ARF (ACUTE RENAL FAILURE) (HCC): Primary | ICD-10-CM

## 2022-09-12 DIAGNOSIS — K83.8 PNEUMOBILIA: ICD-10-CM

## 2022-09-12 PROBLEM — E87.29 INCREASED ANION GAP METABOLIC ACIDOSIS: Status: ACTIVE | Noted: 2022-01-01

## 2022-09-12 PROBLEM — E87.2 INCREASED ANION GAP METABOLIC ACIDOSIS: Status: ACTIVE | Noted: 2022-09-12

## 2022-09-12 LAB
2HR DELTA HS TROPONIN: -3 NG/L
4HR DELTA HS TROPONIN: -7 NG/L
ALBUMIN SERPL BCP-MCNC: 3.3 G/DL (ref 3.5–5)
ALP SERPL-CCNC: 135 U/L (ref 46–116)
ALT SERPL W P-5'-P-CCNC: 17 U/L (ref 12–78)
ANION GAP SERPL CALCULATED.3IONS-SCNC: 18 MMOL/L (ref 4–13)
ANION GAP SERPL CALCULATED.3IONS-SCNC: 19 MMOL/L (ref 4–13)
ANION GAP SERPL CALCULATED.3IONS-SCNC: 20 MMOL/L (ref 4–13)
APTT PPP: 47 SECONDS (ref 23–37)
AST SERPL W P-5'-P-CCNC: 17 U/L (ref 5–45)
BASOPHILS # BLD AUTO: 0.02 THOUSANDS/ΜL (ref 0–0.1)
BASOPHILS NFR BLD AUTO: 0 % (ref 0–1)
BILIRUB SERPL-MCNC: 0.4 MG/DL (ref 0.2–1)
BILIRUB UR QL STRIP: NEGATIVE
BUN SERPL-MCNC: 103 MG/DL (ref 5–25)
BUN SERPL-MCNC: 104 MG/DL (ref 5–25)
BUN SERPL-MCNC: 104 MG/DL (ref 5–25)
CALCIUM ALBUM COR SERPL-MCNC: 9.1 MG/DL (ref 8.3–10.1)
CALCIUM SERPL-MCNC: 8.4 MG/DL (ref 8.3–10.1)
CALCIUM SERPL-MCNC: 8.5 MG/DL (ref 8.3–10.1)
CALCIUM SERPL-MCNC: 8.7 MG/DL (ref 8.3–10.1)
CARDIAC TROPONIN I PNL SERPL HS: 48 NG/L
CARDIAC TROPONIN I PNL SERPL HS: 52 NG/L
CARDIAC TROPONIN I PNL SERPL HS: 55 NG/L
CHLORIDE SERPL-SCNC: 109 MMOL/L (ref 96–108)
CHLORIDE SERPL-SCNC: 111 MMOL/L (ref 96–108)
CHLORIDE SERPL-SCNC: 111 MMOL/L (ref 96–108)
CLARITY UR: CLEAR
CO2 SERPL-SCNC: 10 MMOL/L (ref 21–32)
CO2 SERPL-SCNC: 12 MMOL/L (ref 21–32)
CO2 SERPL-SCNC: 9 MMOL/L (ref 21–32)
COLOR UR: YELLOW
CREAT SERPL-MCNC: 8.31 MG/DL (ref 0.6–1.3)
CREAT SERPL-MCNC: 8.32 MG/DL (ref 0.6–1.3)
CREAT SERPL-MCNC: 8.44 MG/DL (ref 0.6–1.3)
EOSINOPHIL # BLD AUTO: 0.15 THOUSAND/ΜL (ref 0–0.61)
EOSINOPHIL NFR BLD AUTO: 2 % (ref 0–6)
ERYTHROCYTE [DISTWIDTH] IN BLOOD BY AUTOMATED COUNT: 18.7 % (ref 11.6–15.1)
GFR SERPL CREATININE-BSD FRML MDRD: 5 ML/MIN/1.73SQ M
GLUCOSE SERPL-MCNC: 122 MG/DL (ref 65–140)
GLUCOSE SERPL-MCNC: 135 MG/DL (ref 65–140)
GLUCOSE SERPL-MCNC: 91 MG/DL (ref 65–140)
GLUCOSE SERPL-MCNC: 98 MG/DL (ref 65–140)
GLUCOSE UR STRIP-MCNC: NEGATIVE MG/DL
HCT VFR BLD AUTO: 32.6 % (ref 36.5–49.3)
HGB BLD-MCNC: 10.3 G/DL (ref 12–17)
HGB UR QL STRIP.AUTO: NEGATIVE
IMM GRANULOCYTES # BLD AUTO: 0.14 THOUSAND/UL (ref 0–0.2)
IMM GRANULOCYTES NFR BLD AUTO: 1 % (ref 0–2)
INR PPP: 2.62 (ref 0.84–1.19)
KETONES UR STRIP-MCNC: NEGATIVE MG/DL
LACTATE SERPL-SCNC: 1.2 MMOL/L (ref 0.5–2)
LEUKOCYTE ESTERASE UR QL STRIP: NEGATIVE
LYMPHOCYTES # BLD AUTO: 1.19 THOUSANDS/ΜL (ref 0.6–4.47)
LYMPHOCYTES NFR BLD AUTO: 12 % (ref 14–44)
MAGNESIUM SERPL-MCNC: 1.1 MG/DL (ref 1.6–2.6)
MCH RBC QN AUTO: 26.5 PG (ref 26.8–34.3)
MCHC RBC AUTO-ENTMCNC: 31.6 G/DL (ref 31.4–37.4)
MCV RBC AUTO: 84 FL (ref 82–98)
MONOCYTES # BLD AUTO: 0.7 THOUSAND/ΜL (ref 0.17–1.22)
MONOCYTES NFR BLD AUTO: 7 % (ref 4–12)
NEUTROPHILS # BLD AUTO: 8 THOUSANDS/ΜL (ref 1.85–7.62)
NEUTS SEG NFR BLD AUTO: 78 % (ref 43–75)
NITRITE UR QL STRIP: NEGATIVE
NRBC BLD AUTO-RTO: 1 /100 WBCS
PH UR STRIP.AUTO: 5 [PH]
PHOSPHATE SERPL-MCNC: 5.4 MG/DL (ref 2.3–4.1)
PLATELET # BLD AUTO: 130 THOUSANDS/UL (ref 149–390)
POTASSIUM SERPL-SCNC: 5 MMOL/L (ref 3.5–5.3)
POTASSIUM SERPL-SCNC: 5.4 MMOL/L (ref 3.5–5.3)
POTASSIUM SERPL-SCNC: 5.4 MMOL/L (ref 3.5–5.3)
PROCALCITONIN SERPL-MCNC: 0.6 NG/ML
PROT SERPL-MCNC: 7 G/DL (ref 6.4–8.4)
PROT UR STRIP-MCNC: NEGATIVE MG/DL
PROTHROMBIN TIME: 29.3 SECONDS (ref 11.6–14.5)
RBC # BLD AUTO: 3.88 MILLION/UL (ref 3.88–5.62)
SODIUM SERPL-SCNC: 139 MMOL/L (ref 135–147)
SODIUM SERPL-SCNC: 139 MMOL/L (ref 135–147)
SODIUM SERPL-SCNC: 141 MMOL/L (ref 135–147)
SP GR UR STRIP.AUTO: 1.02 (ref 1–1.03)
UROBILINOGEN UR QL STRIP.AUTO: 0.2 E.U./DL
WBC # BLD AUTO: 10.2 THOUSAND/UL (ref 4.31–10.16)

## 2022-09-12 PROCEDURE — 99291 CRITICAL CARE FIRST HOUR: CPT | Performed by: EMERGENCY MEDICINE

## 2022-09-12 PROCEDURE — 81003 URINALYSIS AUTO W/O SCOPE: CPT | Performed by: EMERGENCY MEDICINE

## 2022-09-12 PROCEDURE — 80048 BASIC METABOLIC PNL TOTAL CA: CPT | Performed by: PHYSICIAN ASSISTANT

## 2022-09-12 PROCEDURE — 83605 ASSAY OF LACTIC ACID: CPT | Performed by: EMERGENCY MEDICINE

## 2022-09-12 PROCEDURE — 85025 COMPLETE CBC W/AUTO DIFF WBC: CPT | Performed by: EMERGENCY MEDICINE

## 2022-09-12 PROCEDURE — 99222 1ST HOSP IP/OBS MODERATE 55: CPT | Performed by: PHYSICIAN ASSISTANT

## 2022-09-12 PROCEDURE — 71045 X-RAY EXAM CHEST 1 VIEW: CPT

## 2022-09-12 PROCEDURE — 80053 COMPREHEN METABOLIC PANEL: CPT | Performed by: EMERGENCY MEDICINE

## 2022-09-12 PROCEDURE — 84100 ASSAY OF PHOSPHORUS: CPT | Performed by: PHYSICIAN ASSISTANT

## 2022-09-12 PROCEDURE — 84484 ASSAY OF TROPONIN QUANT: CPT | Performed by: EMERGENCY MEDICINE

## 2022-09-12 PROCEDURE — 99291 CRITICAL CARE FIRST HOUR: CPT | Performed by: PHYSICIAN ASSISTANT

## 2022-09-12 PROCEDURE — 96374 THER/PROPH/DIAG INJ IV PUSH: CPT

## 2022-09-12 PROCEDURE — 83735 ASSAY OF MAGNESIUM: CPT | Performed by: PHYSICIAN ASSISTANT

## 2022-09-12 PROCEDURE — 87449 NOS EACH ORGANISM AG IA: CPT | Performed by: PHYSICIAN ASSISTANT

## 2022-09-12 PROCEDURE — 93005 ELECTROCARDIOGRAM TRACING: CPT

## 2022-09-12 PROCEDURE — 71250 CT THORAX DX C-: CPT

## 2022-09-12 PROCEDURE — 36415 COLL VENOUS BLD VENIPUNCTURE: CPT | Performed by: EMERGENCY MEDICINE

## 2022-09-12 PROCEDURE — 82948 REAGENT STRIP/BLOOD GLUCOSE: CPT

## 2022-09-12 PROCEDURE — 87040 BLOOD CULTURE FOR BACTERIA: CPT | Performed by: EMERGENCY MEDICINE

## 2022-09-12 PROCEDURE — 96361 HYDRATE IV INFUSION ADD-ON: CPT

## 2022-09-12 PROCEDURE — 80048 BASIC METABOLIC PNL TOTAL CA: CPT | Performed by: EMERGENCY MEDICINE

## 2022-09-12 PROCEDURE — 0D768ZZ DILATION OF STOMACH, VIA NATURAL OR ARTIFICIAL OPENING ENDOSCOPIC: ICD-10-PCS | Performed by: INTERNAL MEDICINE

## 2022-09-12 PROCEDURE — 0DB68ZX EXCISION OF STOMACH, VIA NATURAL OR ARTIFICIAL OPENING ENDOSCOPIC, DIAGNOSTIC: ICD-10-PCS | Performed by: INTERNAL MEDICINE

## 2022-09-12 PROCEDURE — 74176 CT ABD & PELVIS W/O CONTRAST: CPT

## 2022-09-12 PROCEDURE — 85730 THROMBOPLASTIN TIME PARTIAL: CPT | Performed by: EMERGENCY MEDICINE

## 2022-09-12 PROCEDURE — 96375 TX/PRO/DX INJ NEW DRUG ADDON: CPT

## 2022-09-12 PROCEDURE — 84145 PROCALCITONIN (PCT): CPT | Performed by: EMERGENCY MEDICINE

## 2022-09-12 PROCEDURE — 85610 PROTHROMBIN TIME: CPT | Performed by: EMERGENCY MEDICINE

## 2022-09-12 PROCEDURE — G1004 CDSM NDSC: HCPCS

## 2022-09-12 PROCEDURE — 99285 EMERGENCY DEPT VISIT HI MDM: CPT

## 2022-09-12 RX ORDER — ALBUTEROL SULFATE 90 UG/1
2 AEROSOL, METERED RESPIRATORY (INHALATION) EVERY 6 HOURS PRN
Status: DISCONTINUED | OUTPATIENT
Start: 2022-09-12 | End: 2022-10-04 | Stop reason: HOSPADM

## 2022-09-12 RX ORDER — ALBUTEROL SULFATE 2.5 MG/3ML
5 SOLUTION RESPIRATORY (INHALATION) ONCE
Status: COMPLETED | OUTPATIENT
Start: 2022-09-12 | End: 2022-09-12

## 2022-09-12 RX ORDER — DEXTROSE MONOHYDRATE 25 G/50ML
25 INJECTION, SOLUTION INTRAVENOUS ONCE
Status: DISCONTINUED | OUTPATIENT
Start: 2022-09-12 | End: 2022-09-12

## 2022-09-12 RX ORDER — DEXTROSE MONOHYDRATE 25 G/50ML
25 INJECTION, SOLUTION INTRAVENOUS ONCE
Status: COMPLETED | OUTPATIENT
Start: 2022-09-12 | End: 2022-09-12

## 2022-09-12 RX ORDER — LANOLIN ALCOHOL/MO/W.PET/CERES
6 CREAM (GRAM) TOPICAL
Status: DISCONTINUED | OUTPATIENT
Start: 2022-09-12 | End: 2022-10-04 | Stop reason: HOSPADM

## 2022-09-12 RX ORDER — ALLOPURINOL 300 MG/1
300 TABLET ORAL DAILY
Status: DISCONTINUED | OUTPATIENT
Start: 2022-09-13 | End: 2022-10-04 | Stop reason: HOSPADM

## 2022-09-12 RX ORDER — CLOTRIMAZOLE 1 %
CREAM (GRAM) TOPICAL 2 TIMES DAILY
Status: DISCONTINUED | OUTPATIENT
Start: 2022-09-12 | End: 2022-10-04 | Stop reason: HOSPADM

## 2022-09-12 RX ORDER — ATORVASTATIN CALCIUM 40 MG/1
40 TABLET, FILM COATED ORAL
Status: DISCONTINUED | OUTPATIENT
Start: 2022-09-12 | End: 2022-10-04 | Stop reason: HOSPADM

## 2022-09-12 RX ORDER — PANTOPRAZOLE SODIUM 40 MG/1
40 TABLET, DELAYED RELEASE ORAL
Status: DISCONTINUED | OUTPATIENT
Start: 2022-09-13 | End: 2022-09-23

## 2022-09-12 RX ORDER — INSULIN LISPRO 100 [IU]/ML
1-6 INJECTION, SOLUTION INTRAVENOUS; SUBCUTANEOUS EVERY 6 HOURS SCHEDULED
Status: DISCONTINUED | OUTPATIENT
Start: 2022-09-13 | End: 2022-09-16

## 2022-09-12 RX ORDER — INSULIN GLARGINE 100 [IU]/ML
10 INJECTION, SOLUTION SUBCUTANEOUS
Status: DISCONTINUED | OUTPATIENT
Start: 2022-09-12 | End: 2022-09-17

## 2022-09-12 RX ORDER — CALCIUM GLUCONATE 20 MG/ML
1 INJECTION, SOLUTION INTRAVENOUS ONCE
Status: COMPLETED | OUTPATIENT
Start: 2022-09-12 | End: 2022-09-12

## 2022-09-12 RX ADMIN — INSULIN GLARGINE 10 UNITS: 100 INJECTION, SOLUTION SUBCUTANEOUS at 21:41

## 2022-09-12 RX ADMIN — SODIUM CHLORIDE 500 ML: 0.9 INJECTION, SOLUTION INTRAVENOUS at 18:13

## 2022-09-12 RX ADMIN — SODIUM BICARBONATE 50 MEQ: 84 INJECTION INTRAVENOUS at 20:20

## 2022-09-12 RX ADMIN — CLOTRIMAZOLE: 0.01 CREAM TOPICAL at 21:42

## 2022-09-12 RX ADMIN — ALBUTEROL SULFATE 5 MG: 2.5 SOLUTION RESPIRATORY (INHALATION) at 19:19

## 2022-09-12 RX ADMIN — DEXTROSE MONOHYDRATE 25 G: 25 INJECTION, SOLUTION INTRAVENOUS at 19:14

## 2022-09-12 RX ADMIN — PIPERACILLIN SODIUM AND TAZOBACTAM SODIUM 3.38 G: 3; .375 INJECTION, POWDER, LYOPHILIZED, FOR SOLUTION INTRAVENOUS at 19:56

## 2022-09-12 RX ADMIN — SODIUM BICARBONATE 100 ML/HR: 84 INJECTION, SOLUTION INTRAVENOUS at 20:21

## 2022-09-12 RX ADMIN — CALCIUM GLUCONATE 1 G: 20 INJECTION, SOLUTION INTRAVENOUS at 19:05

## 2022-09-12 RX ADMIN — SODIUM CHLORIDE 500 ML: 0.9 INJECTION, SOLUTION INTRAVENOUS at 21:57

## 2022-09-12 RX ADMIN — ATORVASTATIN CALCIUM 40 MG: 40 TABLET, FILM COATED ORAL at 21:41

## 2022-09-12 RX ADMIN — INSULIN HUMAN 10 UNITS: 100 INJECTION, SOLUTION PARENTERAL at 19:16

## 2022-09-12 RX ADMIN — Medication 6 MG: at 21:41

## 2022-09-12 NOTE — Clinical Note
Case was discussed with hospitlaist and the patient's admission status was agreed to be Admission Status: inpatient status to the service of Dr Garfield Clements

## 2022-09-12 NOTE — TELEPHONE ENCOUNTER
Spoke with Alicia Maguire at [facility] regarding Miguelito Aguirre had a bone marrow biopsy on 9/8/22  No bleeding, pain, fever or chills  She reports that the patient had an episode of nausea with small emesis yesterday  Patient is better today  She also reports that the patient had a 20 lb weight loss over the past 5 days and that his appetite has decreased  Patient is currently getting receiving infusion therapy for multiple myeloma, last infusion was 9/6/22  Kelli Keane to reach out to patients oncology doctor that his nausea and weight loss could be related to his treatment  Weight documented on 8/30/22 was 118 kg  Weight documented at last infusion on 9/6/22 was 117 kg  Per Alicia Maguire, patient has labs ordered and is to follow-up with oncology tomorrow

## 2022-09-12 NOTE — ED NOTES
Pts brief changed, calazime cream applied to sacral area  Pt repositioned with foam wedge to left side at 1815 for skin breakdown prevention       [employer]  09/12/22 1827

## 2022-09-12 NOTE — ED PROVIDER NOTES
History  Chief Complaint   Patient presents with    Abnormal Lab     Patient arrives via EMS from [facility] for an elevated creatinine of 7 3  Reports yellow emesis for a few days  Patient has RUQ and RLQ abdominal pain, is currently getting chemo once a week for kidney cancer  Patient is a 72-year-old male with a complex past medical history including multiple myeloma getting infusions of Velcade, chronic combined systolic and diastolic congestive heart failure,  Cardiomyopathy with EF in 10/5/2021 resulted as 45%, chronic atrial fibrillation, chronic obstructive pulmonary disease, hypertension, hyperlipidemia,  Peripheral vascular disease, diabetes, chronic kidney disease stage 3 (baseline creatinine 1 8-2), AK a of left lower extremity, morbid obesity who presents with abnormal labs at [facility]  Patient reports nausea, vomiting, diarrhea and abdominal pain  Patient states that he has been having ongoing diarrhea that is non-bloody, non-mucoid and does have right sided abdominal pain where the velcade infusions are, but over last few days, everything seems to be worse and the pain is now not only RLQ but also RUQ  He is now also nauseated and has had a few episodes of non-bloody, yellow emesis  Denies any fevers, chills, chest pain, shortness of breath, neck or back pain  He had outpatient labs done which showed a creatinine of 7, which prompted the ER evaluation  He also admits to decreased urination over last 1 week  Prior to Admission Medications   Prescriptions Last Dose Informant Patient Reported? Taking?    Alcohol Swabs (ALCOHOL PREP) 70 % PADS  Outside Facility (Specify) Yes No   BD AUTOSHIELD DUO 30G X 5 MM MISC  Outside Facility (Specify) No No   Sig: USE AS DIRECTED WITH INSULIN FOUR TIMES A DAY   BD INSULIN SYRINGE U/F 31G X 5/16" 0 5 ML MISC  Outside Facility (Specify) Yes No   Sig: USE AS DIRECTED WITH NOVOLIN 70/30   BD PEN NEEDLE HILARIO U/F 32G X 4 MM [facility] (Specify) No No   Sig: by Other route 3 (three) times a day   Easy Touch Safety Lancets 28G MISC  Outside Facility (Specify) No No   Sig: USE 4 TIMES DAILY TO TEST BLOOD SUGAR   Insulin Glargine Solostar (Lantus SoloStar) 100 UNIT/ML SOPN  Outside Facility (Specify) No No   Sig: Inject 10 Units under the skin daily at bedtime   NOVOLOG FLEXPEN 100 units/mL SOPN  Outside Facility (Specify) No No   Sig: INJ 5U BEFORE MEALS AND PER SS <250=NO CALL 250-300=5U,301-350= 10U,351-400=15U,401-450=20U>451 CALL FOR ORDERS UP TO 4X PER DAY   acetaminophen (TYLENOL) 500 mg tablet  Outside Facility (Specify) No No   Sig: Take 1 tablet (500 mg total) by mouth every 6 (six) hours as needed for mild pain, headaches or fever   albuterol (PROVENTIL HFA,VENTOLIN HFA) 90 mcg/act inhaler  Outside Facility (Specify) No No   Sig: Inhale 2 puffs every 6 (six) hours as needed for wheezing   allopurinol (ZYLOPRIM) 300 mg tablet  Outside Facility (Specify) No No   Sig: TAKE ONE TABLET BY MOUTH DAILY   Patient taking differently: 300 mg   ammonium lactate (LAC-HYDRIN) 12 % lotion  Outside Facility (Specify) No No   Sig: APPLY TO BLE TOPICALLY DAILY   atorvastatin (LIPITOR) 40 mg tablet  Outside Facility (Specify) No No   Sig: Take 1 tablet (40 mg total) by mouth daily after dinner   bortezomib (VELCADE)  Outside Facility (Specify) Yes No   Sig: Infuse into a venous catheter once     Patient not taking: No sig reported   cholestyramine sugar free (QUESTRAN LIGHT) 4 g packet  Outside Facility (Specify) No No   Sig: Take 1 packet (4 g total) by mouth 2 (two) times a day   clotrimazole (LOTRIMIN) 1 % cream  Outside Facility (Specify) No No   Sig: APPLY TO BUTTOCK 2 TIMES DAILY   fluticasone (FLONASE) 50 mcg/act nasal spray  Outside Facility (Specify) Yes No   fluticasone-salmeterol (ADVAIR DISKUS, WIXELA INHUB) 250-50 mcg/dose inhaler  Outside Facility (Specify) No No   Sig: Inhale 1 puff 2 (two) times a day Rinse mouth after use     liraglutide (Victoza) injection  Outside Facility (Specify) Yes No   Sig: Inject 1 8 mg under the skin daily   loperamide (IMODIUM) 2 mg capsule  Outside Facility (Specify) Yes No   Sig: Take 2 mg by mouth 2 (two) times a day as needed for diarrhea   multivitamin-minerals therapeutic (THERA-M)  Outside Facility (Specify) Yes No   nystatin (MYCOSTATIN) powder  Outside Facility (Specify) Yes No   Sig: Apply 220,890 application topically 4 (four) times a day   omeprazole (PriLOSEC) 40 MG capsule  Outside Facility (Specify) No No   Sig: Take 1 capsule (40 mg total) by mouth daily Half an hour prior to breakfast   ondansetron (ZOFRAN) 4 mg tablet  Outside Facility (Specify) Yes No   Sig: ondansetron HCl 4 mg tablet   TAKE 1 TABLET BY MOUTH EVERY 8 HOURS AS NEEDED   potassium chloride (K-DUR,KLOR-CON) 20 mEq tablet  Outside Facility (Specify) No No   Sig: Take 2 tablets (40 mEq total) by mouth daily   torsemide (DEMADEX) 20 mg tablet  Outside Facility (Specify) No No   Sig: Take 2 tablets (40 mg total) by mouth 2 (two) times a day      Facility-Administered Medications: None       Past Medical History:   Diagnosis Date    Cancer ([place] [id number] )     [facility]     Chronic kidney disease     [facility] disease) [facility] )     COVID-19     Decubitus ulcer of heel     LAST ASSESSED [id number]    Diabetes mellitus ([facility] )     History of varicose veins     Hypertension     Intermittent claudication (HCC)     Neuropathy     Seasonal allergies        Past Surgical History:   Procedure Laterality Date    AMPUTATION ABOVE KNEE (AKA) Left 7/31/2019    Procedure: AMPUTATION ABOVE KNEE (AKA);   Surgeon: Kenia Ambriz MD;  Location:  MAIN OR;  Service: General    ANGIOPLASTY      W/ FLUOROSC ANGIOGRAPGY PERIPHERAL ARTERY ADDITIONAL  LAST ASSESSED [id number]    ANGIOPLASTY / STENTING FEMORAL      TANSCATH INTRAVASCULAR STENT PLACEMENT PERCUTANEOUS FEMORAL     COLONOSCOPY  2010    CT BONE MARROW BIOPSY AND ASPIRATION  8/9/2019    FULL THICKNESS SKIN GRAFT      IR BIOPSY BONE MARROW  9/8/2022    TENDON REPAIR      TOE AMPUTATION Left 12/27/2018    Procedure: AMPUTATION left 4th TOE;  Surgeon: Neyda Ulloa DPM;  Location:  MAIN OR;  Service: Podiatry       Family History   Problem Relation Age of Onset    Other Mother         CARDIAC DISORDER     Diabetes Mother     Cancer Father     Other Family         CARDIAC DISORDER     Diabetes Family     Cancer Family     Mental illness Family     Kidney disease Sister     Diabetes Sister      I have reviewed and agree with the history as documented  E-Cigarette/Vaping    E-Cigarette Use Never User      E-Cigarette/Vaping Substances    Nicotine No     THC No     CBD No     Flavoring No     Other No     Unknown No      Social History     Tobacco Use    Smoking status: Never Smoker    Smokeless tobacco: Never Used   Vaping Use    Vaping Use: Never used   Substance Use Topics    Alcohol use: Not Currently    Drug use: Not Currently       Review of Systems   Constitutional: Positive for fatigue  Negative for chills and fever  HENT: Negative for congestion and rhinorrhea  Eyes: Negative for photophobia and visual disturbance  Respiratory: Negative for cough and shortness of breath  Cardiovascular: Negative for chest pain and palpitations  Gastrointestinal: Positive for abdominal pain, diarrhea, nausea and vomiting  Negative for blood in stool and constipation  Genitourinary: Positive for decreased urine volume and difficulty urinating  Negative for dysuria, flank pain and hematuria  Musculoskeletal: Negative for back pain and neck pain  Skin: Negative for color change and pallor  Neurological: Positive for weakness (generalized weakness)  Negative for dizziness, light-headedness, numbness and headaches  Physical Exam  Physical Exam  Vitals and nursing note reviewed     Constitutional:       General: He is not in acute distress  Appearance: Normal appearance  He is not ill-appearing, toxic-appearing or diaphoretic  HENT:      Head: Normocephalic and atraumatic  Mouth/Throat:      Mouth: Mucous membranes are moist    Eyes:      Conjunctiva/sclera: Conjunctivae normal       Pupils: Pupils are equal, round, and reactive to light  Cardiovascular:      Rate and Rhythm: Normal rate  Rhythm irregular  Pulses: Normal pulses  Heart sounds: Normal heart sounds  No murmur heard  Pulmonary:      Effort: Pulmonary effort is normal  No respiratory distress  Breath sounds: Normal breath sounds  No stridor  No wheezing, rhonchi or rales  Chest:      Chest wall: No tenderness  Abdominal:      General: Abdomen is protuberant  Bowel sounds are normal  There is no distension  Palpations: Abdomen is soft  There is no mass  Tenderness: There is abdominal tenderness in the right upper quadrant and right lower quadrant  There is no right CVA tenderness, left CVA tenderness, guarding or rebound  Negative signs include Lentz's sign, Rovsing's sign, McBurney's sign and psoas sign  Musculoskeletal:      Cervical back: Neck supple  Skin:     General: Skin is warm and dry  Neurological:      General: No focal deficit present  Mental Status: He is alert and oriented to person, place, and time  Mental status is at baseline     Psychiatric:         Mood and Affect: Mood normal          Behavior: Behavior normal          Vital Signs  ED Triage Vitals [09/12/22 1615]   Temperature Pulse Respirations Blood Pressure SpO2   98 2 °F (36 8 °C) 75 20 121/59 100 %      Temp Source Heart Rate Source Patient Position - Orthostatic VS BP Location FiO2 (%)   Oral Monitor Lying Right arm --      Pain Score       9           Vitals:    09/12/22 1615 09/12/22 1832   BP: 121/59 131/68   Pulse: 75 77   Patient Position - Orthostatic VS: Lying          Visual Acuity      ED Medications  Medications piperacillin-tazobactam (ZOSYN) IVPB 3 375 g (3 375 g Intravenous New Bag 9/12/22 1956)   sodium bicarbonate 8 4 % injection 50 mEq (has no administration in time range)   sodium bicarbonate 150 mEq in dextrose 5 % 1,000 mL infusion (has no administration in time range)   sodium chloride 0 9 % bolus 500 mL (0 mL Intravenous Stopped 9/12/22 1944)   calcium gluconate 1 g in sodium chloride 0 9% 50 mL (premix) (0 g Intravenous Stopped 9/12/22 1920)   insulin regular (HumuLIN R,NovoLIN R) injection 10 Units (10 Units Intravenous Given 9/12/22 1916)   dextrose 50 % IV solution 25 g (25 g Intravenous Given 9/12/22 1914)   albuterol inhalation solution 5 mg (5 mg Nebulization Given 9/12/22 1919)       Diagnostic Studies  Results Reviewed     Procedure Component Value Units Date/Time    Basic metabolic panel [[id number]] Collected: 09/12/22 1958    Lab Status:  In process Specimen: Blood from Arm, Right Updated: 09/12/22 2000    HS Troponin I 2hr [[id number]]  (Abnormal) Collected: 09/12/22 1914    Lab Status: Final result Specimen: Blood from Arm, Right Updated: 09/12/22 1954     hs TnI 2hr 52 ng/L      Delta 2hr hsTnI -3 ng/L     HS Troponin I 4hr [[id number]]     Lab Status: No result Specimen: Blood     UA w Reflex to Microscopic w Reflex to Culture [[id number]] Collected: 09/12/22 1830    Lab Status: Final result Specimen: Urine, Indwelling [place] Catheter Updated: 09/12/22 1837     Color, UA Yellow     Clarity, UA Clear     Specific Gravity, UA 1 025     pH, UA 5 0     Leukocytes, UA Negative     Nitrite, UA Negative     Protein, UA Negative mg/dl      Glucose, UA Negative mg/dl      Ketones, UA Negative mg/dl      Urobilinogen, UA 0 2 E U /dl      Bilirubin, UA Negative     Occult Blood, UA Negative    Protime-INR [[id number]]  (Abnormal) Collected: 09/12/22 1658    Lab Status: Final result Specimen: Blood from Arm, Right Updated: 09/12/22 1809     Protime 29 3 seconds      INR 2 62    APTT [[id number]]  (Abnormal) Collected: 09/12/22 1658    Lab Status: Final result Specimen: Blood from Arm, Right Updated: 09/12/22 1809     PTT 47 seconds     Procalcitonin [[id number]]  (Abnormal) Collected: 09/12/22 1658    Lab Status: Final result Specimen: Blood from Arm, Right Updated: 09/12/22 1758     Procalcitonin 0 60 ng/ml     HS Troponin 0hr (reflex protocol) [[id number]]  (Abnormal) Collected: 09/12/22 1658    Lab Status: Final result Specimen: Blood from Arm, Right Updated: 09/12/22 1754     hs TnI 0hr 55 ng/L     Lactic acid [[id number]]  (Normal) Collected: 09/12/22 1658    Lab Status: Final result Specimen: Blood from Arm, Right Updated: 09/12/22 1753     LACTIC ACID 1 2 mmol/L     Narrative:      Result may be elevated if tourniquet was used during collection      Comprehensive metabolic panel [[id number]]  (Abnormal) Collected: 09/12/22 1658    Lab Status: Final result Specimen: Blood from Arm, Right Updated: 09/12/22 1752     Sodium 139 mmol/L      Potassium 5 4 mmol/L      Chloride 109 mmol/L      CO2 10 mmol/L      ANION GAP 20 mmol/L       mg/dL      Creatinine 8 44 mg/dL      Glucose 91 mg/dL      Calcium 8 5 mg/dL      Corrected Calcium 9 1 mg/dL      AST 17 U/L      ALT 17 U/L      Alkaline Phosphatase 135 U/L      Total Protein 7 0 g/dL      Albumin 3 3 g/dL      Total Bilirubin 0 40 mg/dL      eGFR 5 ml/min/1 73sq m     Narrative:      National Kidney Disease Foundation guidelines for Chronic Kidney Disease (CKD):     Stage 1 with normal or high GFR (GFR > 90 mL/min/1 73 square meters)    Stage 2 Mild CKD (GFR = 60-89 mL/min/1 73 square meters)    Stage 3A Moderate CKD (GFR = 45-59 mL/min/1 73 square meters)    Stage 3B Moderate CKD (GFR = 30-44 mL/min/1 73 square meters)    Stage 4 Severe CKD (GFR = 15-29 mL/min/1 73 square meters)    Stage 5 End Stage CKD (GFR <15 mL/min/1 73 square meters)  Note: GFR calculation is accurate only with a steady state creatinine    CBC and differential [[id number]]  (Abnormal) Collected: 09/12/22 1658    Lab Status: Final result Specimen: Blood from Arm, Right Updated: 09/12/22 1750     WBC 10 20 Thousand/uL      RBC 3 88 Million/uL      Hemoglobin 10 3 g/dL      Hematocrit 32 6 %      MCV 84 fL      MCH 26 5 pg      MCHC 31 6 g/dL      RDW 18 7 %      Platelets 267 Thousands/uL      nRBC 1 /100 WBCs      Neutrophils Relative 78 %      Immat GRANS % 1 %      Lymphocytes Relative 12 %      Monocytes Relative 7 %      Eosinophils Relative 2 %      Basophils Relative 0 %      Neutrophils Absolute 8 00 Thousands/µL      Immature Grans Absolute 0 14 Thousand/uL      Lymphocytes Absolute 1 19 Thousands/µL      Monocytes Absolute 0 70 Thousand/µL      Eosinophils Absolute 0 15 Thousand/µL      Basophils Absolute 0 02 Thousands/µL     Blood culture #2 [[id number]] Collected: 09/12/22 1658    Lab Status: In process Specimen: Blood from Arm, Right Updated: 09/12/22 1732    Blood culture #1 [[id number]] Collected: 09/12/22 1658    Lab Status: In process Specimen: Blood from Arm, Right Updated: 09/12/22 1732                 CT chest abdomen pelvis wo contrast   Final Result by Suzan Andrews DO (09/12 1858)      Acute sigmoid diverticulitis without drainable fluid collection, decreased compared to prior study  If not already performed recently, colonoscopy after the acute illness is recommended to rule out possibility of colon cancer mimicking diverticulitis  Common bile duct stent with pneumobilia and air within the gallbladder  Distended gallbladder with cholelithiasis  Cannot rule out acute cholecystitis  Thickening versus underdistention of the urinary bladder which may represent cystitis  Follow-up with urology is recommended  The study was marked in [facility] for immediate notification                          Workstation performed: [id number]         XR chest portable    (Results Pending)              Procedures  CriticalCare Time  Performed by: Ger Jaime DO  Authorized by: Gregor Don DO     Critical care provider statement:     Critical care time (minutes):  31    Critical care start time:  9/12/2022 6:00 PM    Critical care end time:  9/12/2022 7:15 PM    Critical care time was exclusive of:  Separately billable procedures and treating other patients and teaching time    Critical care was necessary to treat or prevent imminent or life-threatening deterioration of the following conditions:  Metabolic crisis    Critical care was time spent personally by me on the following activities:  Blood draw for specimens, obtaining history from patient or surrogate, development of treatment plan with patient or surrogate, discussions with consultants, discussions with primary provider, examination of patient, review of old charts, re-evaluation of patient's condition, ordering and review of radiographic studies, ordering and review of laboratory studies, ordering and performing treatments and interventions and evaluation of patient's response to treatment    I assumed direction of critical care for this patient from another provider in my specialty: no    ECG 12 Lead Documentation Only    Date/Time: 9/12/2022 7:16 PM  Performed by: Gregor Don DO  Authorized by: Gregor Don DO     Indications / Diagnosis:  Infectious workup  ECG reviewed by me, the ED Provider: yes    Patient location:  ED  Previous ECG:     Previous ECG:  Compared to current    Comparison ECG info:  8/8/2022    Similarity:  Changes noted  Interpretation:     Interpretation: non-specific    Rate:     ECG rate:  72    ECG rate assessment: normal    Rhythm:     Rhythm: atrial fibrillation    Ectopy:     Ectopy: none    QRS:     QRS axis:  Normal    QRS intervals:   Wide  Conduction:     Conduction: normal    ST segments:     ST segments:  Non-specific  T waves:     T waves: non-specific    Comments:      qtc 477             ED Course  ED Course as of 09/12/22 2009   Mon Sep 12, 2022   1809 Potassium(!): 5 4   1827 Tigertexted nephrology for further recommendations- to discuss plan of care  [address] Benson Signs, nephrology recommends gentle fluids, treating the hyperkalemia, monitoring lab values  Nephrology consult placed  1903 Acute sigmoid diverticulitis without drainable fluid collection, decreased compared to prior study  If not already performed recently, colonoscopy after the acute illness is recommended to rule out possibility of colon cancer mimicking diverticulitis  Common bile duct stent with pneumobilia and air within the gallbladder  Distended gallbladder with cholelithiasis  Cannot rule out acute cholecystitis      Thickening versus underdistention of the urinary bladder which may represent cystitis  Follow-up with urology is recommended   1908 Tigertexted general surgery for evaluation in setting of potential acute cholecystitis and presentation of N/V/D and abdominal pain  Zosyn ordered as well  9650 As patient is in ARF, now concern for infectious etiology in setting of persistent diverticulitis on imaging as well as new potential acute cholecystitis which may be cause of the N/V and abdominal pain                               SBIRT 22yo+    Flowsheet Row Most Recent Value   SBIRT (23 yo +)    In order to provide better care to our patients, we are screening all of our patients for alcohol and drug use  Would it be okay to ask you these screening questions? Yes Filed at: 09/12/2022 1618   Initial Alcohol Screen: US AUDIT-C     1  How often do you have a drink containing alcohol? 0 Filed at: 09/12/2022 1618   2  How many drinks containing alcohol do you have on a typical day you are drinking? 0 Filed at: 09/12/2022 1618   3a  Male UNDER 65: How often do you have five or more drinks on one occasion? 0 Filed at: 09/12/2022 1618   3b  FEMALE Any Age, or MALE 65+: How often do you have 4 or more drinks on one occassion?  0 Filed at: 09/12/2022 1618   Audit-C Score 0 Filed at: 09/12/2022 1618   HARRIET: How many times in the past year have you    Used an illegal drug or used a prescription medication for non-medical reasons? Never Filed at: 09/12/2022 1618                    [facility]  Number of Diagnoses or Management Options  Abdominal pain  ARF (acute renal failure) (HCC)  Atrial fibrillation (HCC)  Cholelithiasis  Diverticulitis  Hyperkalemia  Nausea vomiting and diarrhea  Pneumobilia  Diagnosis management comments: Assessment and Plan:   74 yo M p/w ARF  ddx includes velcade side effects v  cholecystitis v  Gerd/gastritis v  Colitis v  Enteritis  Will check labs, imaging  Start with gentle fluid bolus as EF low in setting of cardiomyopathy         Disposition  Final diagnoses:   ARF (acute renal failure) (HCC)   Hyperkalemia   Atrial fibrillation (HCC)   Pneumobilia   Cholelithiasis   Nausea vomiting and diarrhea   Abdominal pain   Diverticulitis   Acute diverticulitis     Time reflects when diagnosis was documented in both MDM as applicable and the Disposition within this note     Time User Action Codes Description Comment    9/12/2022  6:34 PM Roxanne Opoka Add [N17 9] ARF (acute renal failure) ([facility] )     9/12/2022  6:34 PM Roxanne Opoka Add [E87 5] Hyperkalemia     9/12/2022  6:34 PM Roxanne Opoka Add [I48 91] Atrial fibrillation ([facility] )     9/12/2022  7:07 PM Cleo Litter [K83 8] Pneumobilia     9/12/2022  7:07 PM Roxanne Opoka Add [K80 20] Cholelithiasis     9/12/2022  7:12 PM Roxanne Opoka Add [R11 2,  R19 7] Nausea vomiting and diarrhea     9/12/2022  7:13 PM Roxanne Opoka Add [R10 9] Abdominal pain     9/12/2022  7:15 PM Cleo Litter [K57 92] Diverticulitis     9/12/2022  8:01 PM Flower Varina Add [K57 92] Acute diverticulitis       ED Disposition     ED Disposition   Admit    Condition   Stable    Date/Time   Mon Sep 12, 2022  7:37 PM    Comment   Case was discussed with hospitlaist and the patient's admission status was agreed to be Admission Status: inpatient status to the service of Dr Elsy Luna   Follow-up Information    None         Patient's Medications   Discharge Prescriptions    No medications on file       No discharge procedures on file      PDMP Review       Value Time User    PDMP Reviewed  Yes 11/5/2019  2:42 PM Norma Reid MD          ED Provider  Electronically Signed by           Dion Vergara DO  09/12/22 [address] [place] [address],   09/12/22 2010

## 2022-09-13 ENCOUNTER — HOSPITAL ENCOUNTER (OUTPATIENT)
Dept: INFUSION CENTER | Facility: HOSPITAL | Age: 72
Discharge: HOME | End: 2022-09-13
Attending: INTERNAL MEDICINE

## 2022-09-13 PROBLEM — I50.42 CHRONIC COMBINED SYSTOLIC AND DIASTOLIC CONGESTIVE HEART FAILURE (HCC): Status: ACTIVE | Noted: 2017-10-11

## 2022-09-13 LAB
ALBUMIN SERPL BCP-MCNC: 2.6 G/DL (ref 3.5–5)
ALP SERPL-CCNC: 106 U/L (ref 46–116)
ALT SERPL W P-5'-P-CCNC: 16 U/L (ref 12–78)
ANION GAP SERPL CALCULATED.3IONS-SCNC: 14 MMOL/L (ref 4–13)
ANION GAP SERPL CALCULATED.3IONS-SCNC: 16 MMOL/L (ref 4–13)
ANION GAP SERPL CALCULATED.3IONS-SCNC: 16 MMOL/L (ref 4–13)
ANION GAP SERPL CALCULATED.3IONS-SCNC: 19 MMOL/L (ref 4–13)
ANION GAP SERPL CALCULATED.3IONS-SCNC: 20 MMOL/L (ref 4–13)
APTT PPP: 112 SECONDS (ref 23–37)
APTT PPP: 140 SECONDS (ref 23–37)
APTT PPP: 57 SECONDS (ref 23–37)
APTT PPP: >210 SECONDS (ref 23–37)
AST SERPL W P-5'-P-CCNC: 13 U/L (ref 5–45)
BASOPHILS # BLD AUTO: 0.01 THOUSANDS/ΜL (ref 0–0.1)
BASOPHILS NFR BLD AUTO: 0 % (ref 0–1)
BILIRUB SERPL-MCNC: 0.6 MG/DL (ref 0.2–1)
BUN SERPL-MCNC: 101 MG/DL (ref 5–25)
BUN SERPL-MCNC: 102 MG/DL (ref 5–25)
BUN SERPL-MCNC: 103 MG/DL (ref 5–25)
BUN SERPL-MCNC: 104 MG/DL (ref 5–25)
BUN SERPL-MCNC: 98 MG/DL (ref 5–25)
CALCIUM ALBUM COR SERPL-MCNC: 9.2 MG/DL (ref 8.3–10.1)
CALCIUM SERPL-MCNC: 7.6 MG/DL (ref 8.3–10.1)
CALCIUM SERPL-MCNC: 7.8 MG/DL (ref 8.3–10.1)
CALCIUM SERPL-MCNC: 7.9 MG/DL (ref 8.3–10.1)
CALCIUM SERPL-MCNC: 7.9 MG/DL (ref 8.3–10.1)
CALCIUM SERPL-MCNC: 8.1 MG/DL (ref 8.3–10.1)
CHLORIDE SERPL-SCNC: 106 MMOL/L (ref 96–108)
CHLORIDE SERPL-SCNC: 107 MMOL/L (ref 96–108)
CHLORIDE SERPL-SCNC: 109 MMOL/L (ref 96–108)
CHLORIDE SERPL-SCNC: 110 MMOL/L (ref 96–108)
CHLORIDE SERPL-SCNC: 111 MMOL/L (ref 96–108)
CO2 SERPL-SCNC: 10 MMOL/L (ref 21–32)
CO2 SERPL-SCNC: 12 MMOL/L (ref 21–32)
CO2 SERPL-SCNC: 17 MMOL/L (ref 21–32)
CREAT SERPL-MCNC: 7.81 MG/DL (ref 0.6–1.3)
CREAT SERPL-MCNC: 8.14 MG/DL (ref 0.6–1.3)
CREAT SERPL-MCNC: 8.17 MG/DL (ref 0.6–1.3)
CREAT SERPL-MCNC: 8.2 MG/DL (ref 0.6–1.3)
CREAT SERPL-MCNC: 8.21 MG/DL (ref 0.6–1.3)
EOSINOPHIL # BLD AUTO: 0.18 THOUSAND/ΜL (ref 0–0.61)
EOSINOPHIL NFR BLD AUTO: 3 % (ref 0–6)
ERYTHROCYTE [DISTWIDTH] IN BLOOD BY AUTOMATED COUNT: 18.7 % (ref 11.6–15.1)
GFR SERPL CREATININE-BSD FRML MDRD: 5 ML/MIN/1.73SQ M
GFR SERPL CREATININE-BSD FRML MDRD: 6 ML/MIN/1.73SQ M
GLUCOSE SERPL-MCNC: 127 MG/DL (ref 65–140)
GLUCOSE SERPL-MCNC: 129 MG/DL (ref 65–140)
GLUCOSE SERPL-MCNC: 133 MG/DL (ref 65–140)
GLUCOSE SERPL-MCNC: 139 MG/DL (ref 65–140)
GLUCOSE SERPL-MCNC: 139 MG/DL (ref 65–140)
GLUCOSE SERPL-MCNC: 150 MG/DL (ref 65–140)
GLUCOSE SERPL-MCNC: 154 MG/DL (ref 65–140)
GLUCOSE SERPL-MCNC: 165 MG/DL (ref 65–140)
HCT VFR BLD AUTO: 26.2 % (ref 36.5–49.3)
HGB BLD-MCNC: 8.3 G/DL (ref 12–17)
IMM GRANULOCYTES # BLD AUTO: 0.08 THOUSAND/UL (ref 0–0.2)
IMM GRANULOCYTES NFR BLD AUTO: 1 % (ref 0–2)
INR PPP: 2.86 (ref 0.84–1.19)
INR PPP: 2.93 (ref 0.84–1.19)
L PNEUMO1 AG UR QL IA.RAPID: NEGATIVE
LIPASE SERPL-CCNC: 691 U/L (ref 73–393)
LYMPHOCYTES # BLD AUTO: 1.01 THOUSANDS/ΜL (ref 0.6–4.47)
LYMPHOCYTES NFR BLD AUTO: 14 % (ref 14–44)
MAGNESIUM SERPL-MCNC: 1.5 MG/DL (ref 1.6–2.6)
MAGNESIUM SERPL-MCNC: 1.6 MG/DL (ref 1.6–2.6)
MCH RBC QN AUTO: 26.1 PG (ref 26.8–34.3)
MCHC RBC AUTO-ENTMCNC: 31.7 G/DL (ref 31.4–37.4)
MCV RBC AUTO: 82 FL (ref 82–98)
MONOCYTES # BLD AUTO: 0.61 THOUSAND/ΜL (ref 0.17–1.22)
MONOCYTES NFR BLD AUTO: 9 % (ref 4–12)
NEUTROPHILS # BLD AUTO: 5.13 THOUSANDS/ΜL (ref 1.85–7.62)
NEUTS SEG NFR BLD AUTO: 73 % (ref 43–75)
NRBC BLD AUTO-RTO: 1 /100 WBCS
PHOSPHATE SERPL-MCNC: 5.4 MG/DL (ref 2.3–4.1)
PHOSPHATE SERPL-MCNC: 5.6 MG/DL (ref 2.3–4.1)
PLATELET # BLD AUTO: 124 THOUSANDS/UL (ref 149–390)
PMV BLD AUTO: 12.1 FL (ref 8.9–12.7)
POTASSIUM SERPL-SCNC: 3.7 MMOL/L (ref 3.5–5.3)
POTASSIUM SERPL-SCNC: 4 MMOL/L (ref 3.5–5.3)
POTASSIUM SERPL-SCNC: 4.5 MMOL/L (ref 3.5–5.3)
POTASSIUM SERPL-SCNC: 4.8 MMOL/L (ref 3.5–5.3)
POTASSIUM SERPL-SCNC: 4.9 MMOL/L (ref 3.5–5.3)
PROCALCITONIN SERPL-MCNC: 0.62 NG/ML
PROT SERPL-MCNC: 5.8 G/DL (ref 6.4–8.4)
PROTHROMBIN TIME: 31.4 SECONDS (ref 11.6–14.5)
PROTHROMBIN TIME: 32 SECONDS (ref 11.6–14.5)
RBC # BLD AUTO: 3.18 MILLION/UL (ref 3.88–5.62)
S PNEUM AG UR QL: NEGATIVE
SODIUM SERPL-SCNC: 139 MMOL/L (ref 135–147)
SODIUM SERPL-SCNC: 140 MMOL/L (ref 135–147)
SODIUM SERPL-SCNC: 140 MMOL/L (ref 135–147)
SODIUM SERPL-SCNC: 141 MMOL/L (ref 135–147)
SODIUM SERPL-SCNC: 141 MMOL/L (ref 135–147)
WBC # BLD AUTO: 7.02 THOUSAND/UL (ref 4.31–10.16)

## 2022-09-13 PROCEDURE — 93005 ELECTROCARDIOGRAM TRACING: CPT

## 2022-09-13 PROCEDURE — 85025 COMPLETE CBC W/AUTO DIFF WBC: CPT | Performed by: PHYSICIAN ASSISTANT

## 2022-09-13 PROCEDURE — 83735 ASSAY OF MAGNESIUM: CPT

## 2022-09-13 PROCEDURE — 99291 CRITICAL CARE FIRST HOUR: CPT | Performed by: PHYSICIAN ASSISTANT

## 2022-09-13 PROCEDURE — 80048 BASIC METABOLIC PNL TOTAL CA: CPT

## 2022-09-13 PROCEDURE — 85730 THROMBOPLASTIN TIME PARTIAL: CPT | Performed by: PHYSICIAN ASSISTANT

## 2022-09-13 PROCEDURE — 84100 ASSAY OF PHOSPHORUS: CPT

## 2022-09-13 PROCEDURE — 85730 THROMBOPLASTIN TIME PARTIAL: CPT | Performed by: INTERNAL MEDICINE

## 2022-09-13 PROCEDURE — 99223 1ST HOSP IP/OBS HIGH 75: CPT | Performed by: INTERNAL MEDICINE

## 2022-09-13 PROCEDURE — 85610 PROTHROMBIN TIME: CPT

## 2022-09-13 PROCEDURE — 99221 1ST HOSP IP/OBS SF/LOW 40: CPT | Performed by: INTERNAL MEDICINE

## 2022-09-13 PROCEDURE — 99232 SBSQ HOSP IP/OBS MODERATE 35: CPT | Performed by: PHYSICIAN ASSISTANT

## 2022-09-13 PROCEDURE — 85610 PROTHROMBIN TIME: CPT | Performed by: PHYSICIAN ASSISTANT

## 2022-09-13 PROCEDURE — 99497 ADVNCD CARE PLAN 30 MIN: CPT | Performed by: INTERNAL MEDICINE

## 2022-09-13 PROCEDURE — 84145 PROCALCITONIN (PCT): CPT | Performed by: PHYSICIAN ASSISTANT

## 2022-09-13 PROCEDURE — 80048 BASIC METABOLIC PNL TOTAL CA: CPT | Performed by: PHYSICIAN ASSISTANT

## 2022-09-13 PROCEDURE — 80053 COMPREHEN METABOLIC PANEL: CPT | Performed by: PHYSICIAN ASSISTANT

## 2022-09-13 PROCEDURE — 83690 ASSAY OF LIPASE: CPT | Performed by: PHYSICIAN ASSISTANT

## 2022-09-13 PROCEDURE — 85730 THROMBOPLASTIN TIME PARTIAL: CPT

## 2022-09-13 PROCEDURE — 82948 REAGENT STRIP/BLOOD GLUCOSE: CPT

## 2022-09-13 PROCEDURE — 99222 1ST HOSP IP/OBS MODERATE 55: CPT | Performed by: INTERNAL MEDICINE

## 2022-09-13 PROCEDURE — 83735 ASSAY OF MAGNESIUM: CPT | Performed by: PHYSICIAN ASSISTANT

## 2022-09-13 PROCEDURE — 87505 NFCT AGENT DETECTION GI: CPT | Performed by: PHYSICIAN ASSISTANT

## 2022-09-13 PROCEDURE — 84100 ASSAY OF PHOSPHORUS: CPT | Performed by: PHYSICIAN ASSISTANT

## 2022-09-13 PROCEDURE — 87493 C DIFF AMPLIFIED PROBE: CPT | Performed by: PHYSICIAN ASSISTANT

## 2022-09-13 RX ORDER — HEPARIN SODIUM 1000 [USP'U]/ML
4000 INJECTION, SOLUTION INTRAVENOUS; SUBCUTANEOUS
Status: DISCONTINUED | OUTPATIENT
Start: 2022-09-13 | End: 2022-09-18

## 2022-09-13 RX ORDER — HEPARIN SODIUM 10000 [USP'U]/100ML
3-20 INJECTION, SOLUTION INTRAVENOUS
Status: DISCONTINUED | OUTPATIENT
Start: 2022-09-13 | End: 2022-09-18

## 2022-09-13 RX ORDER — HEPARIN SODIUM 1000 [USP'U]/ML
2000 INJECTION, SOLUTION INTRAVENOUS; SUBCUTANEOUS
Status: DISCONTINUED | OUTPATIENT
Start: 2022-09-13 | End: 2022-09-18

## 2022-09-13 RX ORDER — HEPARIN SODIUM 1000 [USP'U]/ML
4000 INJECTION, SOLUTION INTRAVENOUS; SUBCUTANEOUS ONCE
Status: COMPLETED | OUTPATIENT
Start: 2022-09-13 | End: 2022-09-13

## 2022-09-13 RX ORDER — SODIUM CHLORIDE, SODIUM GLUCONATE, SODIUM ACETATE, POTASSIUM CHLORIDE, MAGNESIUM CHLORIDE, SODIUM PHOSPHATE, DIBASIC, AND POTASSIUM PHOSPHATE .53; .5; .37; .037; .03; .012; .00082 G/100ML; G/100ML; G/100ML; G/100ML; G/100ML; G/100ML; G/100ML
500 INJECTION, SOLUTION INTRAVENOUS ONCE
Status: COMPLETED | OUTPATIENT
Start: 2022-09-13 | End: 2022-09-13

## 2022-09-13 RX ORDER — FUROSEMIDE 10 MG/ML
40 SYRINGE (ML) INJECTION CONTINUOUS
Status: DISCONTINUED | OUTPATIENT
Start: 2022-09-13 | End: 2022-09-16

## 2022-09-13 RX ORDER — METOLAZONE 2.5 MG/1
10 TABLET ORAL EVERY 6 HOURS
Status: DISCONTINUED | OUTPATIENT
Start: 2022-09-13 | End: 2022-09-16

## 2022-09-13 RX ORDER — MAGNESIUM SULFATE HEPTAHYDRATE 40 MG/ML
2 INJECTION, SOLUTION INTRAVENOUS ONCE
Status: COMPLETED | OUTPATIENT
Start: 2022-09-13 | End: 2022-09-13

## 2022-09-13 RX ADMIN — PIPERACILLIN AND TAZOBACTAM 3.38 G: 3; .375 INJECTION, POWDER, LYOPHILIZED, FOR SOLUTION INTRAVENOUS at 01:09

## 2022-09-13 RX ADMIN — HEPARIN SODIUM 11.1 UNITS/KG/HR: 10000 INJECTION, SOLUTION INTRAVENOUS at 08:33

## 2022-09-13 RX ADMIN — SODIUM BICARBONATE 125 ML/HR: 84 INJECTION, SOLUTION INTRAVENOUS at 05:34

## 2022-09-13 RX ADMIN — INSULIN LISPRO 1 UNITS: 100 INJECTION, SOLUTION INTRAVENOUS; SUBCUTANEOUS at 12:23

## 2022-09-13 RX ADMIN — METOLAZONE 10 MG: 2.5 TABLET ORAL at 23:08

## 2022-09-13 RX ADMIN — FLUTICASONE FUROATE AND VILANTEROL TRIFENATATE 1 PUFF: 200; 25 POWDER RESPIRATORY (INHALATION) at 10:11

## 2022-09-13 RX ADMIN — MAGNESIUM SULFATE HEPTAHYDRATE 2 G: 40 INJECTION, SOLUTION INTRAVENOUS at 01:16

## 2022-09-13 RX ADMIN — Medication 6 MG: at 21:09

## 2022-09-13 RX ADMIN — PANTOPRAZOLE SODIUM 40 MG: 40 TABLET, DELAYED RELEASE ORAL at 05:28

## 2022-09-13 RX ADMIN — INSULIN LISPRO 1 UNITS: 100 INJECTION, SOLUTION INTRAVENOUS; SUBCUTANEOUS at 05:27

## 2022-09-13 RX ADMIN — FUROSEMIDE 160 MG: 10 INJECTION, SOLUTION INTRAVENOUS at 12:15

## 2022-09-13 RX ADMIN — METOLAZONE 10 MG: 2.5 TABLET ORAL at 17:28

## 2022-09-13 RX ADMIN — CLOTRIMAZOLE: 0.01 CREAM TOPICAL at 10:17

## 2022-09-13 RX ADMIN — SODIUM CHLORIDE, SODIUM GLUCONATE, SODIUM ACETATE, POTASSIUM CHLORIDE, MAGNESIUM CHLORIDE, SODIUM PHOSPHATE, DIBASIC, AND POTASSIUM PHOSPHATE 500 ML: .53; .5; .37; .037; .03; .012; .00082 INJECTION, SOLUTION INTRAVENOUS at 08:25

## 2022-09-13 RX ADMIN — SODIUM BICARBONATE 125 ML/HR: 84 INJECTION, SOLUTION INTRAVENOUS at 23:13

## 2022-09-13 RX ADMIN — PIPERACILLIN AND TAZOBACTAM 3.38 G: 3; .375 INJECTION, POWDER, LYOPHILIZED, FOR SOLUTION INTRAVENOUS at 16:25

## 2022-09-13 RX ADMIN — Medication 20 MG/HR: at 20:00

## 2022-09-13 RX ADMIN — METOLAZONE 10 MG: 2.5 TABLET ORAL at 12:22

## 2022-09-13 RX ADMIN — HEPARIN SODIUM 4000 UNITS: 1000 INJECTION INTRAVENOUS; SUBCUTANEOUS at 08:34

## 2022-09-13 RX ADMIN — SODIUM BICARBONATE 125 ML/HR: 84 INJECTION, SOLUTION INTRAVENOUS at 14:39

## 2022-09-13 RX ADMIN — Medication 20 MG/HR: at 13:06

## 2022-09-13 RX ADMIN — CLOTRIMAZOLE: 0.01 CREAM TOPICAL at 17:00

## 2022-09-13 RX ADMIN — ATORVASTATIN CALCIUM 40 MG: 40 TABLET, FILM COATED ORAL at 17:28

## 2022-09-13 RX ADMIN — ALLOPURINOL 300 MG: 300 TABLET ORAL at 10:11

## 2022-09-13 RX ADMIN — INSULIN GLARGINE 10 UNITS: 100 INJECTION, SOLUTION SUBCUTANEOUS at 21:09

## 2022-09-13 NOTE — ASSESSMENT & PLAN NOTE
Patient has a diagnosis of multiple myeloma not having achieved remission at this time last bone marrow biopsy was positive patient's chemotherapy - Velcade and Decadron  · Is seen by outpatient Oncology 8/19/2022  · Last chemotherapy infusion was 9/6/2022

## 2022-09-13 NOTE — ASSESSMENT & PLAN NOTE
Patient has chronic COPD is on 2 L of oxygen as needed in the nursing  Does not appear to have acute exacerbation  Continue Advair and albuterol

## 2022-09-13 NOTE — CONSULTS
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  - patient not a surgical candidate if concern for cholecystitis would recommended a cholecystotomy tube  - would defer to GI for further evaluation of stent    Diverticulitis  - recent admission 08/02/2022 for acute diverticulitis versus colitis  -CT scan suggests continued acute diverticulitis without drainable fluid collection  Decrease compared to prior study  Patient should have colonoscopy to rule out possibility of colon cancer mimicking diverticulitis  - chronic abdominal pain    Acute renal failure on stage IIIB chronic kidney disease  - nephrology consulted  - baseline creatinine between 2 and 2 3  - monitor renal indices    Chronic atrial fibrillation  - on Coumadin and Toprol  - management per critical care    Multiple myeloma  - on chemotherapy  - patient's last chemo was on 09/06/2022    CHF  - continue to monitor closely  - keep O2 sats greater than 92%    Hyperkalemia  - continue monitor electrolytes    Comorbidities:  GERD, COPD, NALLELY, hyperphosphatemia  - medical management and optimization    History of Present Illness   CC:  Abdominal pain  HPI:  Loan Brand is a 73 y o  male who presents with abdominal pain, nausea, vomiting, diarrhea  Patient is a poor historian  A chart review was performed  Patient has chronic abdominal pain with nausea, vomiting and diarrhea secondary to chemotherapy for multiple myeloma    Patient had a recent hospitalization in early August for acute diverticulitis versus colitis associated with DORA and electrolyte abnormalities  Patient underwent an ERCP for an ampullary adenoma with stent placement at [facility] on 08/29/2022  Consult to surgery general  Consult performed by: Chavo Moore PA-C  Consult ordered by: Ger Jaime DO          Review of Systems   Constitutional: Positive for activity change and appetite change  Negative for chills and fever  HENT: Negative for trouble swallowing  Eyes: Negative for visual disturbance  Respiratory: Positive for shortness of breath  Negative for choking and chest tightness  Cardiovascular: Positive for leg swelling  Negative for chest pain  Gastrointestinal: Positive for abdominal pain and diarrhea  Genitourinary: Negative for difficulty urinating and dysuria  Musculoskeletal: Positive for arthralgias  Neurological: Negative for dizziness  Hematological: Negative for adenopathy  Psychiatric/Behavioral: Negative for agitation  Historical Information   Past Medical History:   Diagnosis Date    Cancer [facility])     [facility]est[facility]     Chronic kidney disease     COPD (chronic obstructive pulmonary disease) ([facility] [address] )     COVID-19     Decubitus ulcer of heel     LAST ASSESSED [id number]    Diabetes mellitus (HCC)     History of varicose veins     Hypertension     Intermittent claudication (HCC)     Neuropathy     Seasonal allergies      Past Surgical History:   Procedure Laterality Date    AMPUTATION ABOVE KNEE (AKA) Left 7/31/2019    Procedure: AMPUTATION ABOVE KNEE (AKA);   Surgeon: Gabi Edwards MD;  Location:  MAIN OR;  Service: General    ANGIOPLASTY      W/ FLUOROSC ANGIOGRAPGY PERIPHERAL ARTERY ADDITIONAL  LAST ASSESSED [id number]    ANGIOPLASTY / STENTING FEMORAL      TANSCATH INTRAVASCULAR STENT PLACEMENT PERCUTANEOUS FEMORAL     COLONOSCOPY  2010    CT BONE MARROW BIOPSY AND ASPIRATION  8/9/2019    FULL THICKNESS SKIN GRAFT      IR BIOPSY BONE MARROW 9/8/2022    TENDON REPAIR      TOE AMPUTATION Left 12/27/2018    Procedure: AMPUTATION left 4th TOE;  Surgeon: Giuliano Chacon DPM;  Location:  MAIN OR;  Service: Podiatry     Social History   Social History     Substance and Sexual Activity   Alcohol Use Not Currently     Social History     Substance and Sexual Activity   Drug Use Not Currently     E-Cigarette/Vaping    E-Cigarette Use Never User      E-Cigarette/Vaping Substances    Nicotine No     THC No     CBD No     Flavoring No     Other No     Unknown No      Social History     Tobacco Use   Smoking Status Never Smoker   Smokeless Tobacco Never Used     Family History: non-contributory    Meds/Allergies   all current active meds have been reviewed  Allergies   Allergen Reactions    Latex Rash       Objective   First Vitals:   Blood Pressure: 121/59 (09/12/22 1615)  Pulse: 75 (09/12/22 1615)  Temperature: 98 2 °F (36 8 °C) (09/12/22 1615)  Temp Source: Oral (09/12/22 1615)  Respirations: 20 (09/12/22 1615)  Height: 6' 3" (190 5 cm) (09/12/22 1615)  Weight - Scale: 109 kg (240 lb) (09/12/22 1615)  SpO2: 100 % (09/12/22 1615)    Current Vitals:   Blood Pressure: 131/68 (09/12/22 1832)  Pulse: 77 (09/12/22 1832)  Temperature: 98 2 °F (36 8 °C) (09/12/22 1615)  Temp Source: Oral (09/12/22 1615)  Respirations: 19 (09/12/22 1832)  Height: 6' 3" (190 5 cm) (09/12/22 1615)  Weight - Scale: 109 kg (240 lb) (09/12/22 1615)  SpO2: 100 % (09/12/22 1832)      Intake/Output Summary (Last 24 hours) at 9/12/2022 2051  Last data filed at 9/12/2022 1944  Gross per 24 hour   Intake 550 ml   Output 35 ml   Net 515 ml       Invasive Devices  Report    Peripheral Intravenous Line  Duration           Peripheral IV 09/12/22 Right Antecubital <1 day          Drain  Duration           Urethral Catheter 14 Fr  <1 day                Physical Exam  Vitals reviewed  Constitutional:       Appearance: He is obese  He is ill-appearing  HENT:      Head: Normocephalic  Mouth/Throat:      Mouth: Mucous membranes are dry  Eyes:      Conjunctiva/sclera: Conjunctivae normal    Cardiovascular:      Rate and Rhythm: Normal rate  Pulses: Normal pulses  Pulmonary:      Effort: Pulmonary effort is normal    Abdominal:      General: There is distension  Palpations: Abdomen is soft  Tenderness: There is abdominal tenderness (diffuse right sided)  Musculoskeletal:      Cervical back: Normal range of motion  Skin:     General: Skin is warm and dry  Neurological:      Mental Status: Mental status is at baseline  Psychiatric:         Mood and Affect: Mood normal          Lab Results:   CBC:   Lab Results   Component Value Date    WBC 10 20 (H) 09/12/2022    HGB 10 3 (L) 09/12/2022    HCT 32 6 (L) 09/12/2022    MCV 84 09/12/2022     (L) 09/12/2022    MCH 26 5 (L) 09/12/2022    MCHC 31 6 09/12/2022    RDW 18 7 (H) 09/12/2022    NRBC 1 09/12/2022   , CMP:   Lab Results   Component Value Date    SODIUM 139 09/12/2022    K 5 0 09/12/2022     (H) 09/12/2022    CO2 9 (LL) 09/12/2022     (H) 09/12/2022    CREATININE 8 31 (H) 09/12/2022    CALCIUM 8 7 09/12/2022    AST 17 09/12/2022    ALT 17 09/12/2022    ALKPHOS 135 (H) 09/12/2022    EGFR 5 09/12/2022   , Coagulation:   Lab Results   Component Value Date    INR 2 62 (H) 09/12/2022   , Urinalysis:   Lab Results   Component Value Date    COLORU Yellow 09/12/2022    CLARITYU Clear 09/12/2022    SPECGRAV 1 025 09/12/2022    PHUR 5 0 09/12/2022    LEUKOCYTESUR Negative 09/12/2022    NITRITE Negative 09/12/2022    GLUCOSEU Negative 09/12/2022    KETONESU Negative 09/12/2022    BILIRUBINUR Negative 09/12/2022    BLOODU Negative 09/12/2022   , Amylase: No results found for: AMYLASE, Lipase: No results found for: LIPASE  Imaging: I have personally reviewed pertinent reports  EKG, Pathology, and Other Studies: I have personally reviewed pertinent reports        Counseling / Coordination of Care [facility]  - [facility]

## 2022-09-13 NOTE — ASSESSMENT & PLAN NOTE
· Bicarb is at 10 on the BMP and a anion gap is at 20  · Most likely related due to renal failure  · Continue to monitor closely

## 2022-09-13 NOTE — ACP (ADVANCE CARE PLANNING)
Record of Family Meeting - Palliative and [facility] MAYRA Cummins 73 y o  male [id number]      Recommendations and Plan:  · Lilo Stanley confirms that son Fern Cooks is his POA and only child  · Lilo Stanley and son agree with emergent dialysis this admission if indicated  We discussed alternatives and prognosis  · Lilo Stanley has not decided if he would accept long-term dialysis; he is open to further discussions  · Lilo Stanley does not wish to change code status at this time  He will consider DNR/DNI but is noncommittal   · Lilo Stanley endorses poor QOL  We discussed comfort cares/conservative cares after discharge however Lilo Stanley disengages from the conversation  Son Fern Cooks will continue to discuss goals of care and advanced care planning with his father  · Phone number for palliative office given to patient and son Fern Cooks should they have further questions or concerns  A family meeting was held for [facility]  This meeting was necessary for determine the appropriate course of treatment  Time of MeetinPM  Meeting Location: patient's room    Participants:  Lilo Stanley actively participated in this discussion  Vicente Bains present via teleconference call  Staff participants: Smooth Quintero PA-C     Topics of Discussion:  We discussed Chinedu's medical conditions and comorbidities  We discussed etiology of presenting acute renal failure  We discussed the status of Chinedu's multiple myeloma, and the difficulty of determining how Lilo Stanley is responding to treatments as labwork has not been done at nursing home  We discussed that he has not achieved remission  We discussed the nature and purpose of hemodialysis  We discussed emergent versus long-term dialysis  We discussed that dialysis does not improve quality of life  We discussed quality of life  We discussed Chinedu's hopes and expectations for his health in the future  We discussed realistic health outcomes    We discussed continued readmissions versus comfort focused approach  We discussed code status  We discussed follow-up availability and contacts  Time Involved in Meetinmin, beginning at approximately 1PM and ending at approximately 1:30PM      Lisseth Anderson PA-C   Palliative and Supportive Care  Clinic/Answering Service: [phone]  You can find me on [employer]!

## 2022-09-13 NOTE — H&P
[place]  H&P- Pankaj Rees 1950, 73 y o  male MRN: [id number]  Unit/Bed#: -01 Encounter: [id number]  Primary Care Provider: Didi Yepez MD   Date and time admitted to hospital: 9/12/2022  4:09 PM    Acute diverticulitis  Assessment & Plan  Patient is a 62-year-old male with an extensive medical history recently admitted from 08/1 to 8/10 for acute diverticulitis was on Rocephin and Flagyl, also had an ERCP and a common bile duct stent placed  Who comes in today again with nausea vomiting and diarrhea for several days  · CT of the abdomen and pelvis shows acute sigmoid diverticulitis without drainable fluid collection compared also with to studies on 08/01 it is decreased, also shows common bile duct stent with normal mood bili and air within the gallbladder, distended gallbladder with cholelithiasis concern for acute cholecystitis also thickened and under distended urinary bladder possible cystitis  · Started on Zosyn 3 375 g IV Q 6 hours  · GI and General surgery consult for further evaluation of cholecystitis  · Continue to monitor end points   · C diff check on diarrhea due to recent use of antibiotics  · Ultrasound right upper quadrant      * DORA (acute kidney injury) ([facility] [address] )  Assessment & Plan  · Patient has acute kidney injury with a creatinine of 8 1 and a metabolic acidosis most likely related to renal failure  · Nephrology,  · Hyperkalemia was treated with dextrose bicarb calcium and insulin  · Last admission patient arrived with for creatinine of 5 97 which resolved to 2 96  · Patient continues to make urine  · Discussion about possible dialysis when patient does not improved by himself  · Q 6 hours labs      Cholecystitis, acute  Assessment & Plan  Patient's CT scan showed possible acute cholecystitis  Continue on Zosyn q 6  Monitor endpoint  Procalcitonin in the a m    Blood cultures obtained  GI and surgery are consulted     Hyperkalemia  Assessment & Plan  · Patient was treated with bicarb calcium insulin and dextrose for hyperkalemia  · BMP q 6 hours  · Nephrology is consult    Acute on chronic combined systolic and diastolic congestive heart failure (HCC)  Assessment & Plan  Wt Readings from Last 3 Encounters:   09/12/22 109 kg (240 lb)   09/06/22 117 kg (257 lb 15 oz)   08/30/22 118 kg (260 lb)     Patient has an EF of 40-45% on last admission was volume overloaded and received Lasix  Patient is on torsemide at baseline  Hold torsemide for now        Stage 3b chronic kidney disease [facility])  Assessment & Plan  Lab Results   Component Value Date    EGFR 5 09/12/2022    EGFR 20 08/10/2022    EGFR 18 08/09/2022    CREATININE 8 44 (H) 09/12/2022    CREATININE 2 96 (H) 08/10/2022    CREATININE 3 30 (H) 08/09/2022     Patient had outpatient labs done which showed a creatinine of 7 0 on last admission creatinine was 5 76 which improved to around 2 96 on discharge  Creatinine now is 8 44 most likely related again due to dehydration and diarrhea  Nephrology is consulted  Discussion about dialysis may need to be made  Patient still makes urine  Due to severe metabolic acidosis started on bicarb drip at 100 an hour  Q 6 hours BMP Mag and phos    Chronic atrial fibrillation [facility])  Assessment & Plan  Patient is on Coumadin his INR was 2 56  Will hold Coumadin for now, start on heparin in the a m   Until planned for surgery verses IR procedure  Patient is well controlled on last admission was on metoprolol and went into to 20 with his heart rate received atropine with good response    Morbid obesity ([facility] [address] )  Assessment & Plan  Encourage nutrition counseling lifestyle changes and weight loss    Hyperlipidemia  Assessment & Plan  Continue Lipitor    Multiple myeloma ([facility] [address] )  Assessment & Plan  Patient has a diagnosis of multiple myeloma not having achieved remission at this time last bone marrow biopsy was positive patient's chemotherapy - Velcade and Decadron  · Is seen by outpatient Oncology 8/19/2022  · Last chemotherapy infusion was 9/6/2022    Ambulatory dysfunction  Assessment & Plan  History of the left AKA    Chronic obstructive pulmonary disease, unspecified ([address] )  Assessment & Plan  Patient has chronic COPD is on 2 L of oxygen as needed in the nursing  Does not appear to have acute exacerbation  Continue Advair and albuterol    GERD (gastroesophageal reflux disease)  Assessment & Plan  Continue Protonix    NALLELY (obstructive sleep apnea)  Assessment & Plan  History of obstructive sleep apnea noncompliance with CPAP    Increased anion gap metabolic acidosis  Assessment & Plan  · Bicarb is at 10 on the BMP and a anion gap is at 20  · Most likely related due to renal failure  · Continue to monitor closely    CKD (chronic kidney disease)  Assessment & Plan  Lab Results   Component Value Date    EGFR 5 09/12/2022    EGFR 5 09/12/2022    EGFR 20 08/10/2022    CREATININE 8 31 (H) 09/12/2022    CREATININE 8 44 (H) 09/12/2022    CREATININE 2 96 (H) 08/10/2022     · Acute on chronic kidney disease creatinine is 8 31 continue to monitor  · Bicarb drip at 100 an hour  · Q 6 hours labs    Diabetes mellitus, type 2 (HCC)  Assessment & Plan  Lab Results   Component Value Date    HGBA1C 6 3 (H) 08/05/2022       No results for input(s): POCGLU in the last 72 hours      Blood Sugar Average: Last 72 hrs:   patient is NPO at this time  Continue insulin sliding scale   Continue bowel bedtime insulin 10 units  Hold Victoza  If needed start insulin drip      -------------------------------------------------------------------------------------------------------------  Chief Complaint:  Nausea vomiting and diarrhea    History of Present Illness   HX and PE limited by:  Patient is a limited and very poor historian  Margo Patient is a 73 y o  male with a medical history of type 2 diabetes, hyperlipidemia, anemia, chronic AFib, gout, status post left BKA, PVD, COPD, NALLELY, morbid obesity, chronic diastolic heart failure, ambulatory dysfunction, lymphedema and multiple myeloma without achieving remission patient came from nursing home with abnormal laboratory values, is no other symptoms documented on nursing home papers but patient stated that he has been having ongoing diarrhea nonbloody and he does have right-sided abdominal pain were the SQ Velcade injections are given  Over the last couple days he complained of more pain increasing diarrhea nausea and vomiting  CT of the abdomen and pelvis was performed by the ER and showed continued acute sigmoid diverticulitis without abscess, distended gallbladder with cholelithiasis concern for acute cholecystitis  The laboratory values also showed a creatinine of 8 with severe anion gap/ metabolic acidosis  Patient was evaluated by the ICU to be admitted as a level 1 step-down Nephrology GI and surgery were called by ER  Discussed patient with Nephrology to place him on bicarb drip and gentle rehydration  Q 6 hours BMPs    History obtained from chart review and the patient   -------------------------------------------------------------------------------------------------------------  Dispo: Admit to Stepdown Level 1    Code Status: Level 1 - Full Code  --------------------------------------------------------------------------------------------------------------  Review of Systems   Constitutional: Positive for fatigue  Negative for appetite change, diaphoresis and fever  HENT: Negative for congestion, facial swelling, rhinorrhea, sneezing and tinnitus  Eyes: Negative for photophobia, pain and discharge  Respiratory: Negative for apnea, cough and shortness of breath  Cardiovascular: Positive for leg swelling  Negative for chest pain  Gastrointestinal: Positive for abdominal distention, abdominal pain, diarrhea, nausea and vomiting  Endocrine: Negative for cold intolerance, polydipsia and polyphagia     Genitourinary: Positive for decreased urine volume  Musculoskeletal: Negative for arthralgias, gait problem and myalgias  Skin: Negative for color change and rash  Allergic/Immunologic: Negative for environmental allergies and food allergies  Neurological: Positive for weakness  Negative for syncope, speech difficulty, light-headedness and headaches  Hematological: Negative for adenopathy  Does not bruise/bleed easily  Psychiatric/Behavioral: Negative for decreased concentration and hallucinations  The patient is not hyperactive  A 12-point, complete review of systems was reviewed and negative except as stated above     Physical Exam  Vitals and nursing note reviewed  Constitutional:       General: He is awake  Appearance: He is well-developed  He is morbidly obese  He is ill-appearing  Interventions: Nasal cannula in place  HENT:      Head: Normocephalic and atraumatic  Eyes:      Pupils: Pupils are equal, round, and reactive to light  Cardiovascular:      Rate and Rhythm: Normal rate and regular rhythm  Pulses: Normal pulses  Heart sounds: Normal heart sounds  Pulmonary:      Effort: Pulmonary effort is normal       Breath sounds: Normal breath sounds  Abdominal:      General: Abdomen is protuberant  Bowel sounds are normal  There is distension  Palpations: Abdomen is soft  Tenderness: There is generalized abdominal tenderness and tenderness in the right upper quadrant and left lower quadrant  There is no guarding  Genitourinary:     Penis: Normal     Musculoskeletal:         General: Normal range of motion  Cervical back: Normal range of motion and neck supple  Right lower leg: Edema present  Left lower leg: Edema present  Comments: Left BKA   Skin:     General: Skin is warm and dry  Capillary Refill: Capillary refill takes less than 2 seconds  Coloration: Skin is pale  Neurological:      Mental Status: He is alert and oriented to person, place, and time  --------------------------------------------------------------------------------------------------------------  Vitals:   Vitals:    09/12/22 1615 09/12/22 1617 09/12/22 1832 09/12/22 2000   BP: 121/59  131/68 140/63   BP Location: Right arm   Right arm   Pulse: 75  77 78   Resp: 20  19 18   Temp: 98 2 °F (36 8 °C)      TempSrc: Oral      SpO2: 100% 100% 100% 100%   Weight: 109 kg (240 lb)      Height: 6' 3" (1 905 m)        Temp  Min: 98 2 °F (36 8 °C)  Max: 98 2 °F (36 8 °C)  IBW (Ideal Body Weight): 84 5 kg  Height: 6' 3" (190 5 cm)  Body mass index is 30 kg/m²  Laboratory and Diagnostics:  Results from last 7 days   Lab Units 09/12/22  1658   WBC Thousand/uL 10 20*   HEMOGLOBIN g/dL 10 3*   HEMATOCRIT % 32 6*   PLATELETS Thousands/uL 130*   NEUTROS PCT % 78*   MONOS PCT % 7     Results from last 7 days   Lab Units 09/12/22 1958 09/12/22  1658   SODIUM mmol/L 139 139   POTASSIUM mmol/L 5 0 5 4*   CHLORIDE mmol/L 111* 109*   CO2 mmol/L 9* 10*   ANION GAP mmol/L 19* 20*   BUN mg/dL 104* 103*   CREATININE mg/dL 8 31* 8 44*   CALCIUM mg/dL 8 7 8 5   GLUCOSE RANDOM mg/dL 122 91   ALT U/L  --  17   AST U/L  --  17   ALK PHOS U/L  --  135*   ALBUMIN g/dL  --  3 3*   TOTAL BILIRUBIN mg/dL  --  0 40          Results from last 7 days   Lab Units 09/12/22  1658   INR  2 62*   PTT seconds 47*          Results from last 7 days   Lab Units 09/12/22  1658   LACTIC ACID mmol/L 1 2     ABG:    VBG:    Results from last 7 days   Lab Units 09/12/22  1658   PROCALCITONIN ng/ml 0 60*       Micro:        EKG: A fib  Imaging: I have personally reviewed pertinent reports          Historical Information   Past Medical History:   Diagnosis Date    Cancer [facility])     CHF (congestive heart failure) (HCC)     Chronic kidney disease     COPD [facility] obstructive [facility] disease) ([facility] )     COVID-19     Decubitus ulcer of heel     LAST ASSESSED [id number]    Diabetes mellitus ([facility] )     History of varicose veins     Hypertension  Intermittent claudication (HCC)     Neuropathy     Seasonal allergies      Past Surgical History:   Procedure Laterality Date    AMPUTATION ABOVE KNEE (AKA) Left 7/31/2019    Procedure: AMPUTATION ABOVE KNEE (AKA);   Surgeon: Schuyler Wheeler MD;  Location: [facility];  Service: General    ANGIOPLASTY      W/ FLUOROSC ANGIOGRAPGY PERIPHERAL ARTERY ADDITIONAL  LAST ASSESSED [id number]    ANGIOPLASTY / STENTING FEMORAL      TANSCATH INTRAVASCULAR STENT PLACEMENT PERCUTANEOUS FEMORAL     COLONOSCOPY  2010    CT BONE MARROW BIOPSY AND ASPIRATION  8/9/2019    FULL THICKNESS SKIN GRAFT      IR BIOPSY BONE MARROW  9/8/2022    TENDON REPAIR      TOE AMPUTATION Left 12/27/2018    Procedure: AMPUTATION left 4th TOE;  Surgeon: Christopher Terrazas DPM;  Location: [facility];  Service: Podiatry     Social History   Social History     Substance and Sexual Activity   Alcohol Use Not Currently     Social History     Substance and Sexual Activity   Drug Use Not Currently     Social History     Tobacco Use   Smoking Status Never Smoker   Smokeless Tobacco Never Used     Exercise History:   Family History:   Family History   Problem Relation Age of Onset    Other Mother         CARDIAC DISORDER     Diabetes Mother     Cancer Father     Other Family         CARDIAC DISORDER     Diabetes Family     Cancer Family     Mental illness Family     Kidney disease Sister     Diabetes Sister      I have reviewed this patient's family history and commented on sigificant items within the HPI      Medications:  Current Facility-Administered Medications   Medication Dose Route Frequency    albuterol (PROVENTIL HFA,VENTOLIN HFA) inhaler 2 puff  2 puff Inhalation Q6H PRN    [START ON 9/13/2022] allopurinol (ZYLOPRIM) tablet 300 mg  300 mg Oral Daily    atorvastatin (LIPITOR) tablet 40 mg  40 mg Oral After Dinner    clotrimazole (LOTRIMIN) 1 % cream   Topical BID    [START ON 9/13/2022] fluticasone-vilanterol (BREO ELLIPTA) 200-25 MCG/INH inhaler 1 puff  1 puff Inhalation Daily    insulin glargine (LANTUS) subcutaneous injection 10 Units 0 1 mL  10 Units Subcutaneous HS    [START ON 9/13/2022] insulin lispro (HumaLOG) 100 units/mL subcutaneous injection 1-6 Units  1-6 Units Subcutaneous Q6H [address]    melatonin tablet 6 mg  6 mg Oral HS    [START ON 9/13/2022] pantoprazole (PROTONIX) EC tablet 40 mg  40 mg Oral Early Morning    [START ON 9/13/2022] piperacillin-tazobactam (ZOSYN) 3 375 g in sodium chloride 0 9 % 100 mL IVPB (EXTENDED-INFUSION)  3 375 g Intravenous Q12H    sodium bicarbonate 150 mEq in dextrose 5 % 1,000 mL infusion  100 mL/hr Intravenous Continuous    sodium chloride 0 9 % bolus 500 mL  500 mL Intravenous Once     Home medications:  Prior to Admission Medications   Prescriptions Last Dose Informant Patient Reported? Taking?    Alcohol Swabs (ALCOHOL PREP) 70 % PADS  Outside Facility (Specify) Yes No   BD AUTOSHIELD DUO 30G X 5 MM MISC  Outside Facility (Specify) No No   Sig: USE AS DIRECTED WITH INSULIN FOUR TIMES A DAY   BD INSULIN SYRINGE U/F 31G X 5/16" 0 5 ML MISC  Outside Facility (Specify) Yes No   Sig: USE AS DIRECTED WITH NOVOLIN 70/30   BD PEN NEEDLE HILARIO U/F 32G X 4 MM MISC  Outside Facility (Specify) No No   Sig: by Other route 3 (three) times a day   Easy Touch Safety Lancets 28G MISC  Outside Facility (Specify) No No   Sig: USE 4 TIMES DAILY TO TEST BLOOD SUGAR   Insulin Glargine Solostar (Lantus SoloStar) 100 UNIT/ML SOPN  Outside Facility (Specify) No No   Sig: Inject 10 Units under the skin daily at bedtime   NOVOLOG FLEXPEN 100 units/mL SOPN  Outside Facility (Specify) No No   Sig: INJ 5U BEFORE MEALS AND PER SS <250=NO CALL 250-300=5U,301-350= 10U,351-400=15U,401-450=20U>451 CALL FOR ORDERS UP TO 4X PER DAY   acetaminophen (TYLENOL) 500 mg tablet  Outside Facility (Specify) No No   Sig: Take 1 tablet (500 mg total) by mouth every 6 (six) hours as needed for mild pain, headaches or fever albuterol (PROVENTIL HFA,VENTOLIN HFA) 90 mcg/act inhaler  Outside Facility (Specify) No No   Sig: Inhale 2 puffs every 6 (six) hours as needed for wheezing   allopurinol (ZYLOPRIM) 300 mg tablet  Outside Facility (Specify) No No   Sig: TAKE ONE TABLET BY MOUTH DAILY   Patient taking differently: 300 mg   ammonium lactate (LAC-HYDRIN) 12 % lotion  Outside Facility (Specify) No No   Sig: APPLY TO BLE TOPICALLY DAILY   atorvastatin (LIPITOR) 40 mg tablet  Outside Facility (Specify) No No   Sig: Take 1 tablet (40 mg total) by mouth daily after dinner   bortezomib (VELCADE)  Outside Facility (Specify) Yes No   Sig: Infuse into a venous catheter once     Patient not taking: No sig reported   cholestyramine sugar free (QUESTRAN LIGHT) 4 g packet  Outside Facility (Specify) No No   Sig: Take 1 packet (4 g total) by mouth 2 (two) times a day   clotrimazole (LOTRIMIN) 1 % cream  Outside Facility (Specify) No No   Sig: APPLY TO BUTTOCK 2 TIMES DAILY   fluticasone (FLONASE) 50 mcg/act nasal spray  Outside Facility (Specify) Yes No   fluticasone-salmeterol (ADVAIR DISKUS, WIXELA INHUB) 250-50 mcg/dose inhaler  Outside Facility (Specify) No No   Sig: Inhale 1 puff 2 (two) times a day Rinse mouth after use     liraglutide (Victoza) injection  Outside Facility (Specify) Yes No   Sig: Inject 1 8 mg under the skin daily   loperamide (IMODIUM) 2 mg capsule  Outside Facility (Specify) Yes No   Sig: Take 2 mg by mouth 2 (two) times a day as needed for diarrhea   multivitamin-minerals therapeutic (THERA-M)  Outside Facility (Specify) Yes No   nystatin (MYCOSTATIN) powder  Outside Facility (Specify) Yes No   Sig: Apply 193,896 application topically 4 (four) times a day   omeprazole (PriLOSEC) 40 MG capsule  Outside Facility (Specify) No No   Sig: Take 1 capsule (40 mg total) by mouth daily Half an hour prior to breakfast   ondansetron (ZOFRAN) 4 mg tablet  Outside Facility (Specify) Yes No   Sig: ondansetron HCl 4 mg tablet   TAKE 1 TABLET BY MOUTH EVERY 8 HOURS AS NEEDED   potassium chloride (K-DUR,KLOR-CON) 20 mEq tablet  Outside Facility (Specify) No No   Sig: Take 2 tablets (40 mEq total) by mouth daily   torsemide (DEMADEX) 20 mg tablet  Outside Facility (Specify) No No   Sig: Take 2 tablets (40 mg total) by mouth 2 (two) times a day      Facility-Administered Medications: None     Allergies: Allergies   Allergen Reactions    Latex Rash       ------------------------------------------------------------------------------------------------------------  Advance Directive and Living Will:      Power of :    POLST:    ------------------------------------------------------------------------------------------------------------  Anticipated Length of Stay is > 2 midnights    Care Time Delivered:   Upon my evaluation, this patient had a high probability of imminent or life-threatening deterioration due to Due to acute kidney injury with metabolic derangement possible cholecystitis and continued acute sigmoid diverticulitis, which required my direct attention, intervention, and personal management  I have personally provided 60 minutes (2005 to 2105) of critical care time, exclusive of procedures, teaching, family meetings, and any prior time recorded by providers other than myself  Julia Parr PA-C        Portions of the record may have been created with voice recognition software  Occasional wrong word or "sound a like" substitutions may have occurred due to the inherent limitations of voice recognition software    Read the chart carefully and recognize, using context, where substitutions have occurred

## 2022-09-13 NOTE — ASSESSMENT & PLAN NOTE
· Patient has acute kidney injury with a creatinine of 8 1 and a metabolic acidosis most likely related to renal failure  · Nephrology,  · Hyperkalemia was treated with dextrose bicarb calcium and insulin  · Last admission patient arrived with for creatinine of 5 97 which resolved to 2 96  · Patient continues to make urine  · Discussion about possible dialysis when patient does not improved by himself  · Q 6 hours labs

## 2022-09-13 NOTE — ASSESSMENT & PLAN NOTE
Patient's CT scan showed possible acute cholecystitis with pneumobilia and air   Continue on Zosyn q 6  Monitor endpoint  Procalcitonin in the a m    Blood cultures obtained  GI and surgery are consulted

## 2022-09-13 NOTE — CASE MANAGEMENT
Case Management Assessment & Discharge Planning Note    Patient name Rhiannon Rodrigez  Location /-82 MRN [id number]  : 1950 Date 2022       Current Admission Date: 2022  Current Admission Diagnosis:DORA (acute kidney injury) [facility])   Patient Active Problem List    Diagnosis Date Noted    Increased anion gap metabolic acidosis     Cholecystitis, acute 2022    Acute diverticulitis 2022    Hyperkalemia 2022    Stage 3b chronic kidney disease ([facility] ) 2022    Anemia 10/04/2021    Supratherapeutic INR 10/04/2021    Traumatic [facility] ulcer [facility] ) 2021    Ambulatory dysfunction 2021    Venous hypertension, chronic, with ulcer and inflammation, right ([facility] ) 2020    Need for influenza vaccination 2020    Fall from bed 2020    Sepsis due to COVID-19 pneumonia ([facility] ) 2020    Difficulty in walking, not elsewhere classified 2020    Venous insufficiency (chronic) (peripheral) 2020    Rash 2019    Lymphedema 2019    Gout 2019    Mass of psoas muscle 2019    CKD (chronic kidney disease) 2019    Hiatal hernia 10/29/2019    Weakness 10/29/2019    Type 2 acute myocardial infarction ([facility] ) 10/29/2019    Above knee amputation of left lower extremity ([facility] ) 2019    Multiple myeloma ([facility] ) 2019    Chronic obstructive pulmonary disease, unspecified ([facility] ) 2019    Hypertensive chronic kidney disease w stg 1-4/unsp chr kdny 2019    DORA (acute kidney injury) ([facility] ) 2019    Diabetes mellitus, type 2 ([facility] ) 2019    SOB (shortness of breath) 2019    NALLELY (obstructive sleep apnea) 2019    Moderate persistent asthma without complication     Peripheral vascular disease ([facility] ) 2019    Localized edema 2018    Acute on chronic combined systolic and diastolic congestive heart failure (HCC) 10/11/2017    Chronic atrial fibrillation ([facility] ) 10/09/2017    Morbid obesity ([place] [address] ) 10/09/2017    Gallstones 07/05/2017    Diarrhea 09/17/2016    Diabetic foot ulcer ([place] [address] ) 09/15/2016    Diabetes mellitus type 2, uncontrolled 09/15/2016    Chronic venous hypertension, right 09/15/2016    Essential hypertension 09/15/2016    Cardiomyopathy ([place] [address] ) 02/17/2016    Onychomycosis 10/27/2015    Diabetes, polyneuropathy ([place] [address] ) 08/21/2014    GERD (gastroesophageal reflux disease) 07/24/2014    Hyperlipidemia 07/24/2014      LOS (days): 1  Geometric Mean LOS (GMLOS) (days): 4 30  Days to GMLOS:3 7     OBJECTIVE:    Risk of Unplanned Readmission Score: 42 13     Current admission status: Inpatient    Preferred Pharmacy:   [address] [address]  Phone: [phone] Fax: [phone]    Primary Care Provider: Joe Christopher MD    Primary Insurance: [facility]  Secondary Insurance:     ASSESSMENT:  [address] [facility] - Son   Primary Phone: [phone] (Mobile)  Home Phone: [phone]               Advance Directives  Does patient have a [address]?: Yes  Does patient have Advance Directives?: Yes  Advance Directives: Living will, Power of  for health care  Primary Contact: Tomasa Dials: son: [phone]     Readmission Root Cause  30 Day Readmission: No    Patient Information  Admitted from[de-identified] Facility [facility])  Mental Status: Alert  During Assessment patient was accompanied by: Not accompanied during assessment  Assessment information provided by[de-identified] Patient  Primary Caregiver: Self  Support Systems: Children, Family members  [place] of Residence: [address] [place] [address] do you live in?: [address] [facility] [address] options   Select all that apply : No steps to enter home  Type of Current Residence: Other (Comment) [facility])  In the last 12 months, was there a time when you were not able to pay the mortgage or rent on time?: No  In the last 12 months, how many places have you lived?: 1  In the last 12 months, was there a time when you did not have a steady place to sleep or slept in a shelter (including now)?: No  Homeless/housing insecurity resource given?: N/A  Living Arrangements: Other (Comment) (Resides at [facility])  Is patient a ?: No    Activities of Daily Living Prior to Admission  Functional Status: Assistance  Completes ADLs independently?: No  Level of ADL dependence: Assistance  Ambulates independently?: No  Level of ambulatory dependence: Assistance  Does patient use assisted devices?: Yes  Assisted Devices (DME) used:  Wheelchair, Angie Dunbar lift  Does patient currently own DME?: Yes  What DME does the patient currently own?: Wheelchair  Does patient have a history of Outpatient Therapy (PT/OT)?: No  Does the patient have a history of Short-Term Rehab?: No  Does patient have a history of HHC?: No  Does patient currently have [facility]?: No    Patient Information Continued  Income Source: Pension/senior living  Does patient have prescription coverage?: Yes  Within the past 12 months, you worried that your food would run out before you got the money to buy more : Never true  Within the past 12 months, the food you bought just didn't last and you didn't have money to get more : Never true  Food insecurity resource given?: N/A  Does patient receive dialysis treatments?: No  Does patient have a history of substance abuse?: No  Does patient have a history of Mental Health Diagnosis?: No    Means of Transportation  Means of Transport to Appts[de-identified] Other (Comment) [facility])  In the past 12 months, has lack of transportation kept you from medical appointments or from getting medications?: No  In the past 12 months, has lack of transportation kept you from meetings, work, or from getting things needed for daily living?: No  Was application for public transport provided?: N/A        DISCHARGE DETAILS:    Additional Comments: Met with Pt  Pt presents AA&Juan Diego3  Discussed role of case management  Pt reports he has resided at [facility] for a year  Pt reports linden lift into wheelchair  Pt reports he has assistance with adls  Pt's son(Gerald) is POA and he has living will  Pt reports his plan is to return to [facility] upon discharge  Referral sent to [facility] via [facility]

## 2022-09-13 NOTE — ASSESSMENT & PLAN NOTE
· Patient was treated with bicarb calcium insulin and dextrose for hyperkalemia  · BMP q 6 hours  · Nephrology is consult

## 2022-09-13 NOTE — ASSESSMENT & PLAN NOTE
Lab Results   Component Value Date    EGFR 6 09/13/2022    EGFR 5 09/12/2022    EGFR 5 09/12/2022    CREATININE 7 81 (H) 09/13/2022    CREATININE 8 32 (H) 09/12/2022    CREATININE 8 31 (H) 09/12/2022     Patient had outpatient labs done which showed a creatinine of 7 0 on last admission creatinine was 5 76 which improved to around 2 96 on discharge  Creatinine now is 8 44 most likely related again due to dehydration and diarrhea  Nephrology is consulted  Discussion about dialysis may need to be made  Patient still makes urine  Due to severe metabolic acidosis started on bicarb drip at 105 an hour  Q 6 hours BMP Mag and phos

## 2022-09-13 NOTE — ASSESSMENT & PLAN NOTE
Lab Results   Component Value Date    EGFR 6 09/13/2022    EGFR 5 09/12/2022    EGFR 5 09/12/2022    CREATININE 7 81 (H) 09/13/2022    CREATININE 8 32 (H) 09/12/2022    CREATININE 8 31 (H) 09/12/2022     · Acute on chronic kidney disease creatinine is 8 31 continue to monitor  · Bicarb drip at 100 an hour  · Q 6 hours labs

## 2022-09-13 NOTE — ASSESSMENT & PLAN NOTE
Lab Results   Component Value Date    HGBA1C 6 3 (H) 08/05/2022       Recent Labs     09/12/22 2324 09/12/22 2325   POCGLU 486* 135       Blood Sugar Average: Last 72 hrs:  (P) 310 5 patient is NPO at this time  Continue insulin sliding scale   Continue  bedtime insulin 10 units  Hold Victoza  If needed start insulin drip

## 2022-09-13 NOTE — UTILIZATION REVIEW
Initial Clinical Review    Admission: Date/Time/Statement:   Admission Orders (From admission, onward)     Ordered        09/12/22 1937  INPATIENT ADMISSION  Once                      Orders Placed This Encounter   Procedures    INPATIENT ADMISSION     Standing Status:   Standing     Number of Occurrences:   1     Order Specific Question:   Level of Care     Answer:   Level 1 Stepdown [13]     Order Specific Question:   Estimated length of stay     Answer:   More than 2 Midnights     Order Specific Question:   Certification     Answer:   I certify that inpatient services are medically necessary for this patient for a duration of greater than two midnights  See H&P and MD Progress Notes for additional information about the patient's course of treatment  ED Arrival Information     Expected   -    Arrival   9/12/2022 16:09    Acuity   Urgent            Means of arrival   Ambulance    Escorted by   [facility]    Service   Critical Care/ICU    Admission type   Urgent            Arrival complaint   -           Chief Complaint   Patient presents with    Abnormal Lab     Patient arrives via EMS from [facility] for an elevated creatinine of 7 3  Reports yellow emesis for a few days  Patient has RUQ and RLQ abdominal pain, is currently getting chemo once a week for kidney cancer  Initial Presentation: 73 y o  male  From SNF to ED via ems admitted inpatient due to acute diverticulitis/DORA/acute cholecystitis/Hyoerkalemia/acute on chronic combined CHF  PMH of type 2 diabetes, hyperlipidemia, anemia, chronic AFib, gout, status post left BKA, PVD, COPD, NALLELY, morbid obesity, chronic diastolic heart failure, ambulatory dysfunction, lymphedema and multiple myeloma without achieving remission and last chemo 9/6/22  Presented due to nausea, vomiting, diarrhea and abdominal pain starting few days prior to arrival   Decreased urination last week  OP labs show creatinine of 7 and sent to ED    On exam: rhythm irregular  Abdominal tenderness RUQ and RLQ  Procalcitonin 0 60  Hs tnl 55, at 2 hours 52 and delta -3   K 5 4  CO2-10  Anion gap 20  Bun 103, creatinine 8 44 with baseline of 2 - 2 3  Wbc 10 20  Ct chest/abodmen showed acute diverticulitis  Possible cholecystitis, cystitis  In the ED given IVF bolus, Calcium + regular Insulin + Dextrose, started on Zosyn, given bicarb and started on bicarb gtt  To continue Zosyn, consuylt surgery and GI, check stool C diff and US RUQ  Consult nephrology, continue IVF, trend BMP, treat electrolyte imbalances  Hold home Victoza, start SSI and continue HS insulin  9/12/22 per Surgery - Patient with pneumobilia and air in Gallbladder/Diverticultis/ARF on stage IIIB CKD  Suspect pneumobilia and air is due to recent stent placed 8/29/22  Patient is not a surgical candidate and if GI with concern of cholecystitis, recommend vanessa tube  Date: 9/13/22   Day 2:  Has ongoing abdominal pain  On exam: obese  Abdominal distention, generalized tenderness in RUQ, RLQ  Bilateral LE edema  Bilateral diminished air entry  Left BKA  Bradycardic  H&H 8 3/26 2  CO2-12  Bun 101, creatinine 8 21  Plan is diuretic challenge today, continue bicarb gtt, trend BMP  Per nephrology 9/13/22 - Patient with DORA and baseline creatinine  Prior 10/2021 wa 1 2 - 14 and more recent 1 8 - 2 2 and on August admission was up to 5 and as of 8/10 down to 2 96  Plan is continue sodium bicarb gtt, if remains oliguric then trial of lasix/lasix infusion  May need renal replacement therapy,  And has consented to dialysis if needed  9/13/22 per GI - patient has acute recurrent diverticulitis and to continue analgesia and IV antibiotics, check stool studies  The Pneumobilia is expected post stent and no intervention needed  No signs of GI bleeding  May need dialysis for DORA and family meeting planned       9/13/22 per Palliative care -  Family meeting is scheduled for later today for goals of care  Earlier today, son agreeable to emergent dialysis if needed  ED Triage Vitals [09/12/22 1615]   Temperature Pulse Respirations Blood Pressure SpO2   98 2 °F (36 8 °C) 75 20 121/59 100 %      Temp Source Heart Rate Source Patient Position - Orthostatic VS BP Location FiO2 (%)   Oral Monitor Lying Right arm --      Pain Score       9          Wt Readings from Last 1 Encounters:   09/13/22 115 kg (253 lb 8 5 oz)     Additional Vital Signs:   09/13/22 0819 97 7 °F (36 5 °C) 60 18 119/60 83 100 % None (Room air) Lying   09/13/22 0622 97 3 °F (36 3 °C) Abnormal  58 18 117/58 80 99 % None (Room air) Lying   09/13/22 0400 98 °F (36 7 °C) 63 16 118/61 84 98 % None (Room air) Lying   09/12/22 2000 -- 78 18 140/63 90 100 % None (Room air) Lying   09/12/22 1832 -- 77 19 131/68 92 100 %         Pertinent Labs/Diagnostic Test Results:   CT chest abdomen pelvis wo contrast   Final Result by John Damico DO (09/12 1858)      Acute sigmoid diverticulitis without drainable fluid collection, decreased compared to prior study  If not already performed recently, colonoscopy after the acute illness is recommended to rule out possibility of colon cancer mimicking diverticulitis  Common bile duct stent with pneumobilia and air within the gallbladder  Distended gallbladder with cholelithiasis  Cannot rule out acute cholecystitis  Thickening versus underdistention of the urinary bladder which may represent cystitis  Follow-up with urology is recommended  [facility] for [facility] notification  Workstation performed: [id number]         XR chest portable   Final Result by Hunter Cook MD (09/13 0805)      Limited study  There is some atelectasis at the left lung base                    Workstation performed: [id number]           9/12/22 ecg Interpretation: non-specific     Rate:     ECG rate:  72     ECG rate assessment: normal     Rhythm:   Rhythm: atrial fibrillation     Ectopy:     Ectopy: none     QRS:     QRS axis:  Normal     QRS intervals:  Wide   Conduction:     Conduction: normal     ST segments:     ST segments:  Non-specific   T waves:     T waves: non-specific     Comments:      qtc 477    Results from last 7 days   Lab Units 09/13/22  0532 09/12/22  1658   WBC Thousand/uL 7 02 10 20*   HEMOGLOBIN g/dL 8 3* 10 3*   HEMATOCRIT % 26 2* 32 6*   PLATELETS Thousands/uL 124* 130*   NEUTROS ABS Thousands/µL 5 13 8 00*     Results from last 7 days   Lab Units 09/13/22  1156 09/13/22  0532 09/13/22  0107 09/12/22 2157 09/12/22 1958   SODIUM mmol/L 140 141 141 141 139   POTASSIUM mmol/L 4 5 4 9 4 8 5 4* 5 0   CHLORIDE mmol/L 109* 110* 111* 111* 111*   CO2 mmol/L 17* 12* 10* 12* 9*   ANION GAP mmol/L 14* 19* 20* 18* 19*   BUN mg/dL 102* 101* 104* 104* 104*   CREATININE mg/dL 8 20* 8 21* 7 81* 8 32* 8 31*   EGFR ml/min/1 73sq m 6 5 6 5 5   CALCIUM mg/dL 7 9* 8 1* 7 9* 8 4 8 7   MAGNESIUM mg/dL 1 6 1 6  --  1 1*  --    PHOSPHORUS mg/dL 5 4* 5 4*  --  5 4*  --      Results from last 7 days   Lab Units 09/13/22  0532 09/12/22  1658   AST U/L 13 17   ALT U/L 16 17   ALK PHOS U/L 106 135*   TOTAL PROTEIN g/dL 5 8* 7 0   ALBUMIN g/dL 2 6* 3 3*   TOTAL BILIRUBIN mg/dL 0 60 0 40     Results from last 7 days   Lab Units 09/13/22  1134 09/13/22  0525 09/12/22  2325 09/12/22  2324   POC GLUCOSE mg/dl 165* 154* 135 486*     Results from last 7 days   Lab Units 09/13/22  1156 09/13/22  0532 09/13/22  0107 09/12/22 2157 09/12/22 1958 09/12/22  1658   GLUCOSE RANDOM mg/dL 133 150* 129 98 122 91     BETA-HYDROXYBUTYRATE   Date Value Ref Range Status   08/03/2022 0 3 <0 6 mmol/L Final      Results from last 7 days   Lab Units 09/12/22  2157 09/12/22  1914 09/12/22  1658   HS TNI 0HR ng/L  --   --  55*   HS TNI 2HR ng/L  --  52*  --    HSTNI D2 ng/L  --  -3  --    HS TNI 4HR ng/L 48  --   --    HSTNI D4 ng/L -7  --   --      Results from last 7 days   Lab Units 09/13/22  0808 09/13/22  0532 09/12/22  1658   PROTIME seconds 31 4* 32 0* 29 3*   INR  2 86* 2 93* 2 62*   PTT seconds 57*  --  47*     Results from last 7 days   Lab Units 09/13/22  0532 09/12/22  1658   PROCALCITONIN ng/ml 0 62* 0 60*     Results from last 7 days   Lab Units 09/12/22  1658   LACTIC ACID mmol/L 1 2     Results from last 7 days   Lab Units 09/13/22  0532   LIPASE u/L 691*     Results from last 7 days   Lab Units 09/12/22  1830   CLARITY UA  Clear   COLOR UA  Yellow   SPEC GRAV UA  1 025   PH UA  5 0   GLUCOSE UA mg/dl Negative   KETONES UA mg/dl Negative   BLOOD UA  Negative   PROTEIN UA mg/dl Negative   NITRITE UA  Negative   BILIRUBIN UA  Negative   UROBILINOGEN UA E U /dl 0 2   LEUKOCYTES UA  Negative     Results from last 7 days   Lab Units 09/12/22  2157   STREP PNEUMONIAE ANTIGEN, URINE  Negative   LEGIONELLA URINARY ANTIGEN  Negative     Results from last 7 days   Lab Units 09/12/22  1658   BLOOD CULTURE  Received in Microbiology Lab  Culture in Progress  Received in Microbiology Lab  Culture in Progress       ED Treatment:   Medication Administration from 09/12/2022 1608 to 09/12/2022 2050       Date/Time Order Dose Route Action Comments     09/12/2022 1813 sodium chloride 0 9 % bolus 500 mL 500 mL Intravenous New Bag      09/12/2022 1905 calcium gluconate 1 g in sodium chloride 0 9% 50 mL (premix) 1 g Intravenous New Bag      09/12/2022 1916 insulin regular (HumuLIN R,NovoLIN R) injection 10 Units 10 Units Intravenous Given      09/12/2022 1914 dextrose 50 % IV solution 25 g 25 g Intravenous Given      09/12/2022 1919 albuterol inhalation solution 5 mg 5 mg Nebulization Given      09/12/2022 1956 piperacillin-tazobactam (ZOSYN) IVPB 3 375 g 3 375 g Intravenous New Bag      09/12/2022 2020 sodium bicarbonate 8 4 % injection 50 mEq 50 mEq Intravenous Given      09/12/2022 2021 sodium bicarbonate 150 mEq in dextrose 5 % 1,000 mL infusion 100 mL/hr Intravenous New Bag         [facility] Medical History:   Diagnosis Date    Cancer ([place] [address] )     CH[facility]estive [facility]) [facility]     Chronic kidney disease     COPD (chronic obstructive pulmonary disease) ([place] [address] )     COVID-19     Decubitus ulcer of heel     LAST ASSESSED [id number]    Diabetes mellitus ([place] [address] )     History of varicose veins     Hypertension     Intermittent claudication ([facility]     Neuropathy     Seasonal allergies      Present on Admission:   Acute diverticulitis   Chronic combined systolic and diastolic congestive heart failure (HCC)   DORA (acute kidney injury) ([place] [address] )   Ambulatory dysfunction   Chronic atrial fibrillation (HCC)   Chronic obstructive pulmonary disease, unspecified (HCC)   CKD (chronic kidney disease)   Diabetes mellitus, type 2 (HCC)   GERD (gastroesophageal reflux disease)   Hyperlipidemia   Hyperkalemia   Morbid obesity ([facility]   Multiple myeloma ([facility]   NALLELY (obstructive sleep apnea)   Stage 3b chronic kidney disease ([facility])      Admitting Diagnosis: Atrial fibrillation (HCC) [I48 91]  Cholelithiasis [K80 20]  Hyperkalemia [E87 5]  Diverticulitis [K57 92]  ARF (acute renal failure) ([facility]) [N17 9]  Abdominal pain [R10 9]  Abnormal laboratory test result [R89 9]  Pneumobilia [K83 8]  Nausea vomiting and diarrhea [R11 2, R19 7]  Acute diverticulitis [K57 92]  Age/Sex: 73 y o  male  Admission Orders:n  9/12/22 1937 inpatient   Scheduled Medications:  allopurinol, 300 mg, Oral, Daily  atorvastatin, 40 mg, Oral, After Dinner  clotrimazole, , Topical, BID  fluticasone-vilanterol, 1 puff, Inhalation, Daily  insulin glargine, 10 Units, Subcutaneous, HS  insulin lispro, 1-6 Units, Subcutaneous, Q6H [facility]  melatonin, 6 mg, Oral, HS  pantoprazole, 40 mg, Oral, Early Morning  piperacillin-tazobactam, 3 375 g, Intravenous, Q12H    magnesium sulfate 2 g/50 mL IVPB (premix) 2 g  Dose: 2 g  Freq:  Once Route: IV  Last Dose: Stopped (09/13/22 0316)  Start: 09/13/22 0100 End: 09/13/22 0316    multi-electrolyte (PLASMALYTE-A/ISOLYTE-S PH 7 4) IV solution 500 mL  Dose: 500 mL  Freq: Once Route: IV  Start: 09/13/22 0700 End: 09/13/22 0825    sodium chloride 0 9 % bolus 500 mL  Dose: 500 mL  Freq: Once Route: IV  Indications of Use: FLUID AND ELECTROLYTE DISTURBANCE  Last Dose: Stopped (09/12/22 2327)  Start: 09/12/22 2100 End: 09/12/22 2327    Continuous IV Infusions:  heparin (porcine), 3-20 Units/kg/hr (Order-Specific), Intravenous, Titrated  sodium bicarbonate infusion, 125 mL/hr, Intravenous, Continuous    sodium bicarbonate 150 mEq in dextrose 5 % 1,000 mL infusion  Rate: 100 mL/hr Dose: 100 mL/hr  Freq: Continuous Route: IV  Last Dose: Stopped (09/13/22 0303)  Start: 09/12/22 2000 End: 09/13/22 0302    PRN Meds: not used   albuterol, 2 puff, Inhalation, Q6H PRN  heparin (porcine), 2,000 Units, Intravenous, Q1H PRN  heparin (porcine), 4,000 Units, Intravenous, Q1H PRN - given 0834 on 9/12/22     Neuro checks every 4 hours  Cardio pulmonary monitoring  Oxygen to keep sat > 90%  NPO     IP CONSULT TO NEPHROLOGY  IP CONSULT TO ACUTE CARE SURGERY  IP CONSULT TO GASTROENTEROLOGY  IP CONSULT TO PALLIATIVE CARE    Network Utilization Review Department  ATTENTION: Please call with any questions or concerns to [phone] and carefully listen to the prompts so that you are directed to the right person  All voicemails are confidential   Valeta Graciela all requests for admission clinical reviews, approved or denied determinations and any other requests to dedicated fax number below belonging to the campus where the patient is receiving treatment   List of dedicated fax numbers for the Facilities:  [address] DENIALS (Administrative/Medical Necessity) [phone]   [address] (Maternity/NICU/Pediatrics) [phone]   [address] [place] [address] Rian Davis  [phone] Bety Sandoval [facility] [address] [address] [place] [address] Bettina Stevenson 1481 [phone] [phone]

## 2022-09-13 NOTE — ASSESSMENT & PLAN NOTE
Patient's CT scan showed possible acute cholecystitis  Continue on Zosyn q 6  Monitor endpoint  Procalcitonin in the a m    Blood cultures obtained  GI and surgery are consulted

## 2022-09-13 NOTE — ASSESSMENT & PLAN NOTE
Patient is on Coumadin his INR was 2 56  Will hold Coumadin for now, start on heparin in the a m   Until planned for surgery verses IR procedure  Patient is well controlled on last admission was on metoprolol and went into to 20 with his heart rate received atropine with good response

## 2022-09-13 NOTE — ED NOTES
Attempted to call report to ICU RN  RN currently busy with another pt, to call back shortly        Yossi Corona RN  09/12/22 2015

## 2022-09-13 NOTE — ASSESSMENT & PLAN NOTE
Patient is a 65-year-old male with an extensive medical history recently admitted from 08/1 to 8/10 for acute diverticulitis was on Rocephin and Flagyl, also had an ERCP and a common bile duct stent placed  Who comes in today again with nausea vomiting and diarrhea for several days     · CT of the abdomen and pelvis shows acute sigmoid diverticulitis without drainable fluid collection compared also with to studies on 08/01 it is decreased, also shows common bile duct stent with normal mood bili and air within the gallbladder, distended gallbladder with cholelithiasis concern for acute cholecystitis also thickened and under distended urinary bladder possible cystitis  · Started on Zosyn 3 375 g IV Q 6 hours  · GI and General surgery consult for further evaluation of cholecystitis  · Continue to monitor end points   · C diff check on diarrhea due to recent use of antibiotics  · Ultrasound right upper quadrant

## 2022-09-13 NOTE — CONSULTS
Consultation - Nephrology   Bettina Fan 73 y o  male MRN: [id number]  Unit/Bed#: -01 Encounter: [id number]    ASSESSMENT/PLAN:    DORA (POA)  -Baseline creatinine: prior to 10/2021 was 1 2 - 1 4, had has recurrent episodes of non HD DORA, more recently baseline was 1 8-2 2, recent hospitalization in early August with DORA with a serum creatinine of 5 on presentation which improved to 2 96 at time of discharge 8/10  -Creatinine on admission 8 44, most recent creatinine 8 21  -Etiology:  Suspect prerenal azotemia 2nd to severe diarrhea with reduced oral intake, sepsis due to diverticulitis, possible conversion to ATN  -UA:  [facility], negative blood/protein  -Renal imaging:  CT abdomen pelvis without contrast on admission-nonobstructing left-sided punctate intrarenal calculi, left-sided, Renal cyst is seen no mention of hydronephrosis  -Avoid hypotension, avoid nephrotoxins, avoid NSAIDS  -Trend BMP  -received 500 mL 0 9% saline bolus x2 on admission, additional 500 mL isolate bolus earlier this morning, started on sodium bicarbonate infusion earlier this morning at 125 mL/hour  -strict I&Os, current urine output 195 mL since admission  -recommend continuing to monitor urine output in response to IV fluid resuscitation if remaining oliguric might require trial of Lasix/Lasix infusion  -lengthy discussion bedside regarding possible requirements of renal replacement therapy in the coming hours to day's depending on patient's response to current treatments  This included risks and benefits associated with dialysis including infection, arrhythmia, sudden death  Reviewed modalities of dialysis/renal replacement therapy pre and indications for renal replacement therapy with patient  Patient consented for dialysis, placed in chart      CKD IIIb  -Etiology:  Suspected diabetic nephropathy, hypertensive nephrosclerosis, cardiorenal syndrome, possible underlying myeloma  -Follows with Dr Colby David as OP, last seen [id number]    Metabolic acidosis  -bicarb 10 admission with anion gap 20, chloride 109, lactic 1 2  -likely 2nd to DORA and GI loss of bicarbonate via diarrhea  -started on sodium bicarbonate infusion, most recent bicarb 12, anion gap 19  -continue sodium bicarbonate infusion, trend BMP    Hyperkalemia  -potassium on admission 5 4, most recently 4 9   -in setting of DORA  -continue sodium bicarbonate infusion, trend BMP    Anemia  -hemoglobin 10 3 on admission reduced 8 3 after IV fluids  -in the setting of DORA and thrombocytopenia consider further TMA workup  -recommend transfuse for goal greater than 7 per primary team    Heart failure with reduced ejection fraction  -10/21 TTE:  EF 45%, mild mitral valve regurgitation    -outpatient diuretic torsemide 40 mg b i d , currently on hold  -weight on admission via bed scale 115 kg, reported recent 20 lb weight loss as outpatient in the setting of poor oral intake and diarrhea  -daily weights monitor intake and output    Multiple myeloma  -under recent bone marrow biopsy 9/8  -follows with Dr Tram Jordan from Hematology-Oncology  -currently on Velcade and Decadron per Heme-Onc, last treatment 9/6    Hypertension  -only outpatient medication is torsemide    Ampulary adenoma  -s/p ERCP with endoscopic mucosal resection s/p PD and CBD stent placement by GI in 7/2022  -Ct scan with CBD stent and pneumobilia and air within gallbladdder  -Surgery consulted/GI consulted    Acute Diverticulitis   -CT AP with acute sigmoid diverticulitis without drainable collection  -On zosyn per primary team  -GI and surgery consulted  -Continue IVF resuscitation for now  -Care per primary team    Hyperphosphatemia  -in the setting of acute renal failure, phosphorus 5 4, currently NPO  -trend daily, resume oral dye recommend renal diet, might require binders hopeful improvement with improvement in renal function    Hypomagnesemia  -magnesium 1 1 on admission most recently 1 6  -status post repletion with 2 g IV magnesium sulfate earlier this morning, recommend goal greater than 2  -etiology likely secondary to acute GI loss    Atrial fibrillation  -on Coumadin as outpatient, currently held in favor of heparin infusion    Attempted to call patient's son James Fernandez, left voicemail to call back    Addition medical problems: Morbid obesity, dm 2 last A1c 6 3    HISTORY OF PRESENT ILLNESS:  Requesting Physician: Roscoe Gilliland*  Reason for Consult:  DORA on CKD, metabolic acidosis    Kanwal Cleaning is a 73y o  year old male who was admitted to [address] [place] [address] after presenting with abnormal outpatient lab work, patient is resident of a nursing facility  Reported to have recently experienced nausea vomiting diarrhea and abdominal pain, reports ongoing diarrhea after recent outpatient Velcade infusion  Noted to have recent hospital admission in early August with similar presentation, had DORA with a creatinine of 5 improved to 2 9 at time of discharge  Review of hematology notes indicate patient had a 20 lb weight loss over the past 5 days as well as decreased appetite  A renal consultation is requested today for assistance in the management of DORA on CKD, metabolic acidosis  PAST MEDICAL HISTORY:  Past Medical History:   Diagnosis Date    Cancer [facility])     [facility]est[facility] failure) [facility])     Chronic kidney disease     COPD [facility]chronic obstructive pulmonary disease) [facility] [address] )     COVID-19     Decubitus ulcer of heel     LAST ASSESSED [id number]    Diabetes mellitus (HCC)     History of varicose veins     Hypertension     Intermittent claudication (HCC)     Neuropathy     Seasonal allergies        PAST SURGICAL HISTORY:  Past Surgical History:   Procedure Laterality Date    AMPUTATION ABOVE KNEE (AKA) Left 7/31/2019    Procedure: AMPUTATION ABOVE KNEE (AKA);   Surgeon: Javier Rutledge MD;  Location:  MAIN OR;  Service: General    ANGIOPLASTY      W/ [address] [place] [address] ADDITIONAL  LAST ASSESSED [id number]    ANGIOPLASTY / STENTING FEMORAL      TANSCATH INTRAVASCULAR STENT PLACEMENT PERCUTANEOUS FEMORAL     COLONOSCOPY  2010    CT BONE MARROW BIOPSY AND ASPIRATION  8/9/2019    FULL THICKNESS SKIN GRAFT      IR BIOPSY BONE MARROW  9/8/2022    TENDON REPAIR      TOE AMPUTATION Left 12/27/2018    Procedure: AMPUTATION left 4th TOE;  Surgeon: Urban Goode DPM;  Location:  MAIN OR;  Service: Podiatry       ALLERGIES:  Allergies   Allergen Reactions    Latex Rash       SOCIAL HISTORY:  Social History     Substance and Sexual Activity   Alcohol Use Not Currently     Social History     Substance and Sexual Activity   Drug Use Not Currently     Social History     Tobacco Use   Smoking Status Never Smoker   Smokeless Tobacco Never Used       FAMILY HISTORY:  Family History   Problem Relation Age of Onset    Other Mother         CARDIAC DISORDER     Diabetes Mother     Cancer Father     Other Family         CARDIAC DISORDER     Diabetes Family     Cancer Family     Mental illness Family     Kidney disease Sister     Diabetes Sister        MEDICATIONS:    Current Facility-Administered Medications:     albuterol (PROVENTIL HFA,VENTOLIN HFA) inhaler 2 puff, 2 puff, Inhalation, Q6H PRN, Julia Parr PA-C    allopurinol (ZYLOPRIM) tablet 300 mg, 300 mg, Oral, Daily, Julia Parr PA-C, 300 mg at 09/13/22 1011    atorvastatin (LIPITOR) tablet 40 mg, 40 mg, Oral, After Dinner, Julia Parr PA-C, 40 mg at 09/12/22 2141    clotrimazole (LOTRIMIN) 1 % cream, , Topical, BID, Julia Parr PA-C, Given at 09/13/22 1017    fluticasone-vilanterol (BREO ELLIPTA) 200-25 MCG/INH inhaler 1 puff, 1 puff, Inhalation, Daily, Julia Parr PA-C, 1 puff at 09/13/22 1011    heparin (porcine) 25,000 units in 0 45% NaCl 250 mL infusion (premix), 3-20 Units/kg/hr (Order-Specific), Intravenous, Titrated, NICOLA Calle, Last Rate: 10 mL/hr at 09/13/22 0833, 11 1 Units/kg/hr at 09/13/22 0833    heparin (porcine) injection 2,000 Units, 2,000 Units, Intravenous, Q1H PRN, NICOLA Lombardo    heparin (porcine) injection 4,000 Units, 4,000 Units, Intravenous, Q1H PRN, NICOLA Lombardo    insulin glargine (LANTUS) subcutaneous injection 10 Units 0 1 mL, 10 Units, Subcutaneous, HS, Julia Parr PA-C, 10 Units at 09/12/22 2141    insulin lispro (HumaLOG) 100 units/mL subcutaneous injection 1-6 Units, 1-6 Units, Subcutaneous, Q6H [address], 1 Units at 09/13/22 0527 **AND** Fingerstick Glucose (POCT), , , Q6H, Julia Parr PA-C    melatonin tablet 6 mg, 6 mg, Oral, HS, Julia Parr PA-C, 6 mg at 09/12/22 2141    pantoprazole (PROTONIX) EC tablet 40 mg, 40 mg, Oral, Early Morning, Julia Parr PA-C, 40 mg at 09/13/22 0528    piperacillin-tazobactam (ZOSYN) 3 375 g in sodium chloride 0 9 % 100 mL IVPB (EXTENDED-INFUSION), 3 375 g, Intravenous, Q12H, Julia Parr PA-C, Last Rate: 25 mL/hr at 09/13/22 0601, Rate Verify at 09/13/22 0601    sodium bicarbonate 150 mEq in dextrose 5 % 1,000 mL infusion, 125 mL/hr, Intravenous, Continuous, Julia Parr PA-C, Last Rate: 125 mL/hr at 09/13/22 0601, 125 mL/hr at 09/13/22 0601    REVIEW OF SYSTEMS:  Review of Systems   Constitutional: Positive for appetite change (Reduced) and fatigue  Respiratory: Negative  Cardiovascular: Negative  Gastrointestinal: Positive for diarrhea  Genitourinary: Negative  Musculoskeletal: Negative  Neurological: Negative  All other systems reviewed and are negative         PHYSICAL EXAM:  Current Weight: Weight - Scale: 115 kg (253 lb 8 5 oz)  First Weight: Weight - Scale: 109 kg (240 lb)  Vitals:    09/13/22 0400 09/13/22 0532 09/13/22 0622 09/13/22 0819   BP: 118/61  117/58 119/60   BP Location: Right arm  Right arm Left arm   Pulse: 63  58 60   Resp: 16  18 18   Temp: 98 °F (36 7 °C)  (!) 97 3 °F (36 3 °C) 97 7 °F (36 5 °C)   TempSrc: Oral  Axillary Oral   SpO2: 98%  99% 100%   Weight:  115 kg (253 lb 8 5 oz)     Height:           Intake/Output Summary (Last 24 hours) at 9/13/2022 1027  Last data filed at 9/13/2022 0800  Gross per 24 hour   Intake 2723 34 ml   Output 195 ml   Net 2528 34 ml     Physical Exam  Vitals and nursing note reviewed  Constitutional:       Appearance: Normal appearance  He is obese  He is ill-appearing  Comments: Sleeping in bed, awakens to verbal stimuli   HENT:      Head: Normocephalic and atraumatic  Nose: Nose normal       Mouth/Throat:      Mouth: Mucous membranes are moist    Eyes:      General: No scleral icterus  Cardiovascular:      Rate and Rhythm: Normal rate and regular rhythm  Pulses: Normal pulses  Pulmonary:      Effort: Pulmonary effort is normal  No respiratory distress  Breath sounds: Normal breath sounds  No wheezing or rales  Abdominal:      General: Abdomen is flat  There is no distension  Palpations: Abdomen is soft  Tenderness: There is no abdominal tenderness  Genitourinary:     Comments: [place] catheter with clear urine in bag  Musculoskeletal:      Cervical back: Neck supple  Comments: Left AKA  Trace right lower extremity   Skin:     General: Skin is warm and dry  Capillary Refill: Capillary refill takes less than 2 seconds  Neurological:      General: No focal deficit present  Mental Status: He is alert and oriented to person, place, and time  Comments: Tremor   Psychiatric:         Mood and Affect: Mood normal           Invasive Devices:   Urethral Catheter 14 Fr   (Active)   Site Assessment Clean;Skin intact 09/12/22 1831   [place] Care Done 09/12/22 2100   Securement Method Securing device (Describe) 09/12/22 1831     Lab Results:   Results from last 7 days   Lab Units 09/13/22  0532 09/13/22  0107 09/12/22  2157 09/12/22 1958 09/12/22  1658   WBC Thousand/uL 7 02  --   --   --  10 20* HEMOGLOBIN g/dL 8 3*  --   --   --  10 3*   HEMATOCRIT % 26 2*  --   --   --  32 6*   PLATELETS Thousands/uL 124*  --   --   --  130*   POTASSIUM mmol/L 4 9 4 8 5 4*   < > 5 4*   CHLORIDE mmol/L 110* 111* 111*   < > 109*   CO2 mmol/L 12* 10* 12*   < > 10*   BUN mg/dL 101* 104* 104*   < > 103*   CREATININE mg/dL 8 21* 7 81* 8 32*   < > 8 44*   CALCIUM mg/dL 8 1* 7 9* 8 4   < > 8 5   MAGNESIUM mg/dL 1 6  --  1 1*  --   --    PHOSPHORUS mg/dL 5 4*  --  5 4*  --   --    ALK PHOS U/L 106  --   --   --  135*   ALT U/L 16  --   --   --  17   AST U/L 13  --   --   --  17    < > = values in this interval not displayed  I have personally reviewed the blood work as stated above and in my note  I have personally reviewed CT abdomen pelvis imaging studies  I have personally reviewed critical care note

## 2022-09-13 NOTE — ASSESSMENT & PLAN NOTE
Wt Readings from Last 3 Encounters:   09/12/22 115 kg (253 lb 8 5 oz)   09/06/22 117 kg (257 lb 15 oz)   08/30/22 118 kg (260 lb)     Patient has an EF of 40-45% on last admission was volume overloaded and received Lasix  Patient is on torsemide at baseline  Hold torsemide for now

## 2022-09-13 NOTE — ASSESSMENT & PLAN NOTE
Wt Readings from Last 3 Encounters:   09/13/22 115 kg (253 lb 8 5 oz)   09/06/22 117 kg (257 lb 15 oz)   08/30/22 118 kg (260 lb)     Patient has an EF of 40-45% on last admission was volume overloaded and received Lasix  Patient is on torsemide at baseline- on hold  Patient is on a Lasix drip and Zaroxolyn

## 2022-09-13 NOTE — CONSULTS
Consultation - Palliative and Supportive Care   Naima Salcido 73 y o  male [id number]    Patient Active Problem List   Diagnosis    Diabetic foot ulcer ([place] [address] )    Diabetes mellitus type 2, uncontrolled    Chronic venous hypertension, right    Essential hypertension    Diarrhea    Chronic atrial fibrillation (HCC)    Morbid obesity (HCC)    Acute on chronic combined systolic and diastolic congestive heart failure (HCC)    Cardiomyopathy (HCC)    Diabetes, polyneuropathy (HCC)    GERD (gastroesophageal reflux disease)    Hyperlipidemia    Onychomycosis    Gallstones    Localized edema    Peripheral vascular disease (HCC)    SOB (shortness of breath)    NALLELY (obstructive sleep apnea)    Moderate persistent asthma without complication    Diabetes mellitus, type 2 ([place] [address] )    DORA (acute kidney injury) ([place] [address] )    Multiple myeloma ([place] [address] )    Above knee amputation of left lower extremity ([place] [address] )    Hiatal hernia    Weakness    Type 2 acute [facility] infarction ([facility])    Chronic obstructive pulmonary disease, unspecified ([place] [address] )    Hypertensive chronic kidney disease w stg 1-4/unsp chr kdny    Mass of psoas muscle    CKD (chronic kidney disease)    Lymphedema    Rash    Difficulty in walking, not elsewhere classified    Gout    Venous insufficiency (chronic) (peripheral)    Sepsis due to COVID-19 pneumonia ([place] [address] )    Fall from bed    Need for influenza vaccination    Venous hypertension, chronic, with ulcer and inflammation, right (HCC)    Traumatic skin ulcer ([place] [address] )    Ambulatory dysfunction    Anemia    Supratherapeutic INR    Stage 3b chronic kidney disease ([place] [address] )    Acute diverticulitis    Hyperkalemia    Increased anion gap metabolic acidosis    Cholecystitis, acute     Active issues specifically addressed today include:   Acute renal failure on CKD  Metabolic acidosis  Diverticulitis  Pneumobilia, rule out cholecystitis  Multiple myeloma - not having achieved remission   Morbid obesity  COPD  NALLELY  DMII    Plan:  1  Symptom management - deferred to primary team    -     2  Goals -    - Family meeting today 9/13 @ 1PM with son Kimi Chester on speaker phone     - Earlier today, Tee Roberto discussed emergent dialysis with nephro team and signed consent      - Further recommendations to follow  Code Status: full code - Level 1   Decisional apparatus:  Patient is competent on my exam today  If competence is lost, patient's substitute decision maker would default to son by [place] 169  Advance Directive / Living Will / POLST:  Son Kimi Chester is medical POA      I have reviewed the patient's controlled substance dispensing history in the Prescription Drug Monitoring Program in compliance with the [facility] regulations before prescribing any controlled substances  We appreciate the invitation to be involved in this patient's care  We will follow  Please do not hesitate to reach our on call provider through our clinic answering service at  should you have acute symptom control concerns  [address]  Patricia Orozco PA-C  Palliative and Supportive Care  Clinic/Answering Service: [phone]  You can find me on [employer]! IDENTIFICATION:  Inpatient consult to Palliative Care  Consult performed by: [address]  Patricia Orozco PA-C  Consult ordered by: NICOLA Kline        Physician Requesting Consult: Juanita Maria*  Reason for Consult / Principal Problem: acute renal failure   Hx and PE limited by: none    HISTORY OF PRESENT ILLNESS:       Sara Avalos is a 73 y o  male who presents with abnormal lab work from nursing home  Ifeoma Bridges has a past medical history of DM type 2, chronic anemia, chronic AFib, s/p left BKA, PVD, COPD, NALLELY, morbid obesity chronic CHF, ambulatory dysfunction (wheelchair bound), and multiple myeloma without achieving remission    He resides at [facility] where he was noted to have ongoing nonbloody diarrhea with increased right-sided abdominal pain  In the nursing home, he was found to have abnormal blood work and was brought to emergency room  CT scan showed distended gallbladder with concern for acute cholecystitis as well as acute sigmoid diverticulitis  Noah Forbes was also found to have severe DORA and anion gap metabolic acidosis  He was admitted to ICU  Noah Forbes was seen by Nephrology team and started on bicarb drip  His renal function has worsened and is now being considered for dialysis  Noah Forbes has also been seen by surgical service and is noted that he is not surgical candidate  Palliative in supportive Care is consulted to assist with goals of care discussions  Socially, Noah Forbes has one child, James Fernandez, whom he has made POBLUE Fernandez lives in [place]  James Fernandez agreeable to join family meeting today at [facility] via teleconference  Review of Systems   Constitutional: Negative for decreased appetite and fever  HENT: Negative for congestion and hearing loss  Respiratory: Negative for cough and shortness of breath  Gastrointestinal: Negative for bloating and abdominal pain  Neurological: Positive for excessive daytime sleepiness and weakness  Psychiatric/Behavioral: The patient is not nervous/anxious  Past Medical History:   Diagnosis Date    Cancer [facility])     [facility]est[facility] failure) [facility])     Chronic kidney disease     COPD [facility]chronic obstructive pulmonary disease) [facility] [address] [facility]     COVID-19     Decubitus ulcer of heel     LAST ASSESSED [id number]    Diabetes mellitus (HCC)     History of varicose veins     Hypertension     Intermittent claudication (HCC)     Neuropathy     Seasonal allergies      Past Surgical History:   Procedure Laterality Date    AMPUTATION ABOVE KNEE (AKA) Left 7/31/2019    Procedure: AMPUTATION ABOVE KNEE (AKA);   Surgeon: Javier Rutledge MD;  Location:  MAIN OR;  Service: General    ANGIOPLASTY      W/ FLUOROSC ANGIOGRAPGY PERIPHERAL ARTERY ADDITIONAL  LAST ASSESSED [id number]    ANGIOPLASTY / STENTING FEMORAL      TANSCATH INTRAVASCULAR STENT PLACEMENT PERCUTANEOUS FEMORAL     COLONOSCOPY  2010    CT BONE MARROW BIOPSY AND ASPIRATION  8/9/2019    FULL THICKNESS SKIN GRAFT      IR BIOPSY BONE MARROW  9/8/2022    TENDON REPAIR      TOE AMPUTATION Left 12/27/2018    Procedure: AMPUTATION left 4th TOE;  Surgeon: Alexy Beasley DPM;  Location:  MAIN OR;  Service: Podiatry     Social History     Socioeconomic History    Marital status:      Spouse name: Not on file    Number of children: 1    Years of education: Not on file    Highest education level: Not on file   Occupational History    Occupation: RETIRED   Tobacco Use    Smoking status: Never Smoker    Smokeless tobacco: Never Used   Vaping Use    Vaping Use: Never used   Substance and Sexual Activity    Alcohol use: Not Currently    Drug use: Not Currently    Sexual activity: Not Currently   Other Topics Concern    Not on file   Social History Narrative    DENIED [address]    FEELS SAFE AT HOME    LIVES WITH FAMILY - SISTER    NO LIVING WILL    POA IN EXISTENCE     SUPPORTIVE AND SAFE    One son, 2 granddaughters     Social Determinants of Health     Financial Resource Strain: Not on file   Food Insecurity: No Food Insecurity    Worried About Running Out of Food in the Last Year: Never true    Deuce of Food in the Last Year: Never true   Transportation Needs: No Transportation Needs    Lack of Transportation (Medical): No    Lack of Transportation (Non-Medical):  No   Physical Activity: Not on file   Stress: Not on file   Social Connections: Not on file   Intimate Partner Violence: Not on file   Housing Stability: Low Risk     Unable to Pay for Housing in the Last Year: No    Number of Places Lived in the Last Year: 1    Unstable Housing in the Last Year: No     Family History   Problem Relation Age of Onset    Other Mother         CARDIAC DISORDER     Diabetes Mother     Cancer Father     Other Family         CARDIAC DISORDER     Diabetes Family     Cancer Family     Mental illness Family     Kidney disease Sister     Diabetes Sister        MEDICATIONS / ALLERGIES:    all current active meds have been reviewed and current meds:   Current Facility-Administered Medications   Medication Dose Route Frequency    albuterol (PROVENTIL HFA,VENTOLIN HFA) inhaler 2 puff  2 puff Inhalation Q6H PRN    allopurinol (ZYLOPRIM) tablet 300 mg  300 mg Oral Daily    atorvastatin (LIPITOR) tablet 40 mg  40 mg Oral After Dinner    clotrimazole (LOTRIMIN) 1 % cream   Topical BID    fluticasone-vilanterol (BREO ELLIPTA) 200-25 MCG/INH inhaler 1 puff  1 puff Inhalation Daily    furosemide (LASIX) 160 mg in dextrose 5 % 50 mL IVPB  160 mg Intravenous Once    furosemide (LASIX) 500 mg infusion 50 mL  20 mg/hr Intravenous Continuous    heparin (porcine) 25,000 units in 0 45% NaCl 250 mL infusion (premix)  3-20 Units/kg/hr (Order-Specific) Intravenous Titrated    heparin (porcine) injection 2,000 Units  2,000 Units Intravenous Q1H PRN    heparin (porcine) injection 4,000 Units  4,000 Units Intravenous Q1H PRN    insulin glargine (LANTUS) subcutaneous injection 10 Units 0 1 mL  10 Units Subcutaneous HS    insulin lispro (HumaLOG) 100 units/mL subcutaneous injection 1-6 Units  1-6 Units Subcutaneous Q6H [address]    melatonin tablet 6 mg  6 mg Oral HS    metolazone (ZAROXOLYN) tablet 10 mg  10 mg Oral Q6H    pantoprazole (PROTONIX) EC tablet 40 mg  40 mg Oral Early Morning    piperacillin-tazobactam (ZOSYN) 3 375 g in sodium chloride 0 9 % 100 mL IVPB (EXTENDED-INFUSION)  3 375 g Intravenous Q12H    sodium bicarbonate 150 mEq in dextrose 5 % 1,000 mL infusion  125 mL/hr Intravenous Continuous       Allergies   Allergen Reactions    Latex Rash       OBJECTIVE:    Physical Exam  Physical Exam  Vitals and nursing note reviewed  Constitutional:       Appearance: He is well-developed  He is obese  He is ill-appearing and toxic-appearing  HENT:      Head: Normocephalic and atraumatic  Eyes:      Conjunctiva/sclera: Conjunctivae normal    Cardiovascular:      Rate and Rhythm: Normal rate  Pulmonary:      Effort: Pulmonary effort is normal  No respiratory distress  Abdominal:      Palpations: Abdomen is soft  Tenderness: There is no abdominal tenderness  Musculoskeletal:      Cervical back: Neck supple  Skin:     General: Skin is warm and dry  Coloration: Skin is pale  Neurological:      Mental Status: He is alert  Psychiatric:         Mood and Affect: Mood normal          Behavior: Behavior normal        Lab Results:   I have personally reviewed pertinent labs  , CBC:   Lab Results   Component Value Date    WBC 7 02 09/13/2022    HGB 8 3 (L) 09/13/2022    HCT 26 2 (L) 09/13/2022    MCV 82 09/13/2022     (L) 09/13/2022    MCH 26 1 (L) 09/13/2022    MCHC 31 7 09/13/2022    RDW 18 7 (H) 09/13/2022    MPV 12 1 09/13/2022    NRBC 1 09/13/2022   , CMP:   Lab Results   Component Value Date    SODIUM 141 09/13/2022    K 4 9 09/13/2022     (H) 09/13/2022    CO2 12 (L) 09/13/2022     (H) 09/13/2022    CREATININE 8 21 (H) 09/13/2022    CALCIUM 8 1 (L) 09/13/2022    AST 13 09/13/2022    ALT 16 09/13/2022    ALKPHOS 106 09/13/2022    EGFR 5 09/13/2022     Imaging Studies: CT abdomen   EKG, Pathology, and Other Studies: CBC, CMP, lipase     Counseling / Coordination of Care    Total floor / unit time spent today 45 minutes  Greater than 50% of total time was spent with the patient and / or family counseling and / or coordination of care  A description of the counseling / coordination of care:  Medical updates given, goals of care counseling/discussion, advanced care planning discussion, discussion of palliative and symptom management, determined competency/POA

## 2022-09-13 NOTE — ASSESSMENT & PLAN NOTE
Patient is a 72-year-old male with an extensive medical history recently admitted from 08/1 to 8/10 for acute diverticulitis was on Rocephin and Flagyl, also had an ERCP and a common bile duct stent placed  Who comes in today again with nausea vomiting and diarrhea for several days     · CT of the abdomen and pelvis shows acute sigmoid diverticulitis without drainable fluid collection compared also with to studies on 08/01 it is decreased, also shows common bile duct stent with normal mood bili and air within the gallbladder, distended gallbladder with cholelithiasis concern for acute cholecystitis also thickened and under distended urinary bladder possible cystitis  · Started on Zosyn 3 375 g IV Q 6 hours  · GI and General surgery consult for further evaluation of cholecystitis  · Continue to monitor end points   · C diff check on diarrhea due to recent use of antibiotics  · Ultrasound right upper quadrant

## 2022-09-13 NOTE — PROGRESS NOTES
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  -no right upper quadrant tenderness on exam, no Lentz's sign  - patient not a surgical candidate if clinical concern remians for cholecystitis would recommended a cholecystotomy tube  -defer to GI for further evaluation of stent     Diverticulitis/colitis  - recent admission 08/02/2022 for acute diverticulitis versus colitis  -CT scan suggests continued acute diverticulitis without drainable fluid collection  Decrease compared to prior study  -patient with multiple days of vomiting and diarrhea nursing facility  -will add C diff and stool studies  -unsure of last colonoscopy  Will need follow-up colonoscopy    GI consultation  -no leukocytosis, afebrile, vital signs stable     Acute renal failure on stage IIIB chronic kidney disease  -likely related to dehydration from vomiting and diarrhea  -Cr elevated to 8 21 this AM  - nephrology consulted  - baseline creatinine between 2 and 2 3  -continue fluid resuscitation  -trend renal indices, avoid nephrotoxic agents     Chronic atrial fibrillation  - on Coumadin and Toprol  - management per critical care  - INR 2 86     Multiple myeloma  - on chemotherapy  - patient's last chemo was on 09/06/2022     CHF  - continue to monitor closely, monitor fluid status  - keep O2 sats greater than 92%     Hyperkalemia  - continue monitor electrolytes     Comorbidities:  GERD, COPD, NALLELY, hyperphosphatemia  - medical management and optimization       Subjective/Objective   Chief Complaint: abdominal pain    Subjective:  Still with abdominal pain but better than admission  Admits to a few days of vomiting and diarrhea at nursing facility  Denies any fevers or chills  Denies any blood in stools  No current nausea  Did have a loose bowel movement this morning  Objective:     Blood pressure 119/60, pulse 60, temperature 97 7 °F (36 5 °C), temperature source Oral, resp  rate 18, height 6' 3" (1 905 m), weight 115 kg (253 lb 8 5 oz), SpO2 100 %  ,Body mass index is 31 69 kg/m²  Intake/Output Summary (Last 24 hours) at 9/13/2022 1103  Last data filed at 9/13/2022 0800  Gross per 24 hour   Intake 2723 34 ml   Output 195 ml   Net 2528 34 ml       Invasive Devices  Report    Peripheral Intravenous Line  Duration           Peripheral IV 09/12/22 Dorsal (posterior); Right Forearm <1 day    Peripheral IV 09/12/22 Right Antecubital <1 day          Drain  Duration           Urethral Catheter 14 Fr  <1 day                Physical Exam:   General appearance: alert and oriented, in no acute distress  Head: Normocephalic, without obvious abnormality, atraumatic, sclerae anicteric, mucous membranes moist  Neck: no JVD and supple, symmetrical, trachea midline  Lungs: clear to auscultation, no wheezes or rales  Heart:   Regular rate and rhythm, S1-S2 normal, no murmur  Abdomen:   Obese, soft, mild diffuse abdominal tenderness, no rebound or guarding, active bowel sounds  Extremities:   No edema, redness or tenderness in the calves or thighs  Skin: Warm, dry  Nursing notes and vital signs reviewed      Lab, Imaging and other studies:  I have personally reviewed pertinent lab results    , CBC:   Lab Results   Component Value Date    WBC 7 02 09/13/2022    HGB 8 3 (L) 09/13/2022    HCT 26 2 (L) 09/13/2022    MCV 82 09/13/2022     (L) 09/13/2022    MCH 26 1 (L) 09/13/2022    MCHC 31 7 09/13/2022    RDW 18 7 (H) 09/13/2022    MPV 12 1 09/13/2022    NRBC 1 09/13/2022   , CMP:   Lab Results   Component Value Date    SODIUM 141 09/13/2022    K 4 9 09/13/2022     (H) 09/13/2022    CO2 12 (L) 09/13/2022     (H) 09/13/2022    CREATININE 8 21 (H) 09/13/2022    CALCIUM 8 1 (L) 09/13/2022    AST 13 09/13/2022    ALT 16 09/13/2022    ALKPHOS 106 09/13/2022    EGFR 5 09/13/2022   , Coagulation:   Lab Results   Component Value Date    INR 2 86 (H) 09/13/2022     VTE Pharmacologic Prophylaxis: Heparin  VTE Mechanical Prophylaxis: sequential compression device     Maylin Sánchez PA-C

## 2022-09-13 NOTE — CONSULTS
Consultation - GI   Charly Bal 73 y o  male MRN: [id number]  Unit/Bed#: -01 Encounter: [id number]      Assessment/Plan     1  Acute diverticulitis  2  Nausea, vomiting, diarrhea  Patient was recently admitted in the beginning of August for acute diverticulitis  Colonoscopy 7/25 noted pre cancerous polyp, mild diverticula in the descending and sigmoid colon, small internal hemorrhoid  CT abdomen and pelvis on admission; Acute sigmoid diverticulitis without drainable fluid collection, decreased compared to prior study  Colonoscopy  recommended to rule out possibility of colon cancer mimicking diverticulitis    Common bile duct stent with pneumobilia and air within the gallbladder    Distended gallbladder with cholelithiasis  Cannot rule out acute cholecystitis  On exam patient denies nausea or vomiting  Patient does state he has generalized abdominal discomfort with palpation     - NPO, sips meds  - pantoprazole 40 mg daily  - IV antibiotics  - stool for C diff and stool enteric ordered    3  Cholelithiasis  CT abdomen and pelvis on admission; Common bile duct stent with pneumobilia and air within the gallbladder  Distended gallbladder with cholelithiasis  Cannot rule out acute cholecystitis  Liver enzymes normal, lipase 691      - surgical consult, patient not a surgical candidate  If concern for cholecystitis would recommend a cholecystotomy tube  4  Anemia  Hemoglobin on admission 10 3, with a m  Labs today 8 3  Recent 08/07/2022 drop to 6 2  Colonoscopy 7/25/22, ERCP 8/29/22, no source of bleeding noted  - monitor hemoglobin, transfuse as necessary    5  Ampullary adenoma   ERCP 8/20 09/2022, Dr Chapin Kilgore, [facility] esophagus seen during EGD, biopsy obtained  ERCP performed with endoscopic mucosal resection of abnormal looking ampullary tissue, PD, CBD stent placed  6  DORA (acute kidney injury)  Creatinine on admission 8 31  (08/10/2022, creatinine 2 96)    7   Chronic atrial fibrillation  Patient currently is on Coumadin as outpatient  Coumadin on hold  8  Multiple myeloma  Patient has diagnosis of multiple myeloma not having achieved remission at this time, last bone marrow biopsy was positive  Chemotherapy- Velcade and Decadron  Followed by Oncology as outpatient  Last chemotherapy infusion was 09/06/2022    9  Diabetes mellitus        Inpatient consult to gastroenterology  Consult performed by: NICOLA Gallegos  Consult ordered by: Calos Pierre, [facility]           Physician Requesting Consult: Wilma Laboy*  Reason for Consult / Principal Problem:  Nausea vomiting, abdominal pain, diarrhea, acute diverticulitis    HPI: Mario Parr is a 73y o  year old male with very complex past medical history including multiple myeloma and getting infusions of Velcade, extensive cardiac history including chronic atrial fibrillation, COPD, hypertension, hyperlipidemia, peripheral vascular disease, diabetes, CKD 3, presents with nausea, vomiting, diarrhea abdominal pain to the ER  On exam, patient denies nausea or vomiting at this time  Generalized abdominal pain noted with palpation  Patient states he has not had a bowel movement since admission  Denies hematochezia or melena  CT abdomen and pelvis on admission; acute sigmoid diverticulitis without drainable fluid collection, decreased compared to prior study  Distended gallbladder with Cholelithiasis, air within the gallbladder is visualized  Patient was admitted to the hospital in early August with diverticulitis as well  Colonoscopy 07/25/2022 noted large polyp removed at cecum, polyp at sigmoid colon  Biopsy revealed pre cancerous polyp, 3 year recall  Patient recently had ERCP with stent placement 08/25/2022 by Dr aDvid Santo  Follow-up ERCP with stent removal to be scheduled  Creatinine on admission 8 44, alkaline phosphatase 135, hemoglobin 10 3, platelets 578  With a m   Labs today WBCs 7 02, hemoglobin 8 3, platelets 553, lipase 691, creatinine 8  21  Review of Systems: Negative for 14 point exam except for HPI  Historical Information   Past Medical History:   Diagnosis Date    Cancer [facility])     [facility])     Chronic kidney disease     COPD [facility]chronic obstructive pulmonary disease) ([facility] [address] )     COVID-19     Decubitus ulcer of heel     LAST ASSESSED [id number]    Diabetes mellitus (HCC)     History of varicose veins     Hypertension     Intermittent claudication (HCC)     Neuropathy     Seasonal allergies      Past Surgical History:   Procedure Laterality Date    AMPUTATION ABOVE KNEE (AKA) Left 7/31/2019    Procedure: AMPUTATION ABOVE KNEE (AKA);   Surgeon: Gardiner Sicard, MD;  Location:  MAIN OR;  Service: General    ANGIOPLASTY      W/ FLUOROSC ANGIOGRAPGY PERIPHERAL ARTERY ADDITIONAL  LAST ASSESSED [id number]    ANGIOPLASTY / STENTING FEMORAL      TANSCATH INTRAVASCULAR STENT PLACEMENT PERCUTANEOUS FEMORAL     COLONOSCOPY  2010    CT BONE MARROW BIOPSY AND ASPIRATION  8/9/2019    FULL THICKNESS SKIN GRAFT      IR BIOPSY BONE MARROW  9/8/2022    TENDON REPAIR      TOE AMPUTATION Left 12/27/2018    Procedure: AMPUTATION left 4th TOE;  Surgeon: Soni Pérez DPM;  Location: [facility];  Service: Podiatry     Social History   Social History     Substance and Sexual Activity   Alcohol Use Not Currently     Social History     Substance and Sexual Activity   Drug Use Not Currently     Social History     Tobacco Use   Smoking Status Never Smoker   Smokeless Tobacco Never Used     Family History   Problem Relation Age of Onset    Other Mother         CARDIAC DISORDER     Diabetes Mother     Cancer Father     Other Family         CARDIAC DISORDER     Diabetes Family     Cancer Family     Mental illness Family     Kidney disease Sister     Diabetes Sister        Meds/Allergies   Current Facility-Administered Medications   Medication Dose Route Frequency    albuterol (PROVENTIL HFA,VENTOLIN HFA) inhaler 2 puff  2 puff Inhalation Q6H PRN    allopurinol (ZYLOPRIM) tablet 300 mg  300 mg Oral Daily    atorvastatin (LIPITOR) tablet 40 mg  40 mg Oral After Dinner    clotrimazole (LOTRIMIN) 1 % cream   Topical BID    fluticasone-vilanterol (BREO ELLIPTA) 200-25 MCG/INH inhaler 1 puff  1 puff Inhalation Daily    heparin (porcine) 25,000 units in 0 45% NaCl 250 mL infusion (premix)  3-20 Units/kg/hr (Order-Specific) Intravenous Titrated    heparin (porcine) injection 2,000 Units  2,000 Units Intravenous Q1H PRN    heparin (porcine) injection 4,000 Units  4,000 Units Intravenous Q1H PRN    insulin glargine (LANTUS) subcutaneous injection 10 Units 0 1 mL  10 Units Subcutaneous HS    insulin lispro (HumaLOG) 100 units/mL subcutaneous injection 1-6 Units  1-6 Units Subcutaneous Q6H [address]    melatonin tablet 6 mg  6 mg Oral HS    pantoprazole (PROTONIX) EC tablet 40 mg  40 mg Oral Early Morning    piperacillin-tazobactam (ZOSYN) 3 375 g in sodium chloride 0 9 % 100 mL IVPB (EXTENDED-INFUSION)  3 375 g Intravenous Q12H    sodium bicarbonate 150 mEq in dextrose 5 % 1,000 mL infusion  125 mL/hr Intravenous Continuous     Medications Prior to Admission   Medication    acetaminophen (TYLENOL) 500 mg tablet    albuterol (PROVENTIL HFA,VENTOLIN HFA) 90 mcg/act inhaler    Alcohol Swabs (ALCOHOL PREP) 70 % PADS    allopurinol (ZYLOPRIM) 300 mg tablet    ammonium lactate (LAC-HYDRIN) 12 % lotion    atorvastatin (LIPITOR) 40 mg tablet    BD AUTOSHIELD DUO 30G X 5 MM MISC    BD INSULIN SYRINGE U/F 31G X 5/16" 0 5 ML MISC    BD PEN NEEDLE HILARIO U/F 32G X 4 MM MISC    bortezomib (VELCADE)    cholestyramine sugar free (QUESTRAN LIGHT) 4 g packet    clotrimazole (LOTRIMIN) 1 % cream    Easy Touch Safety Lancets 28G MISC    fluticasone (FLONASE) 50 mcg/act nasal spray    fluticasone-salmeterol (ADVAIR DISKUS, WIXELA INHUB) 250-50 mcg/dose inhaler    Insulin Glargine Solostar (Lantus SoloStar) 100 UNIT/ML SOPN    liraglutide (Victoza) injection    loperamide (IMODIUM) 2 mg capsule    multivitamin-minerals therapeutic (THERA-M)    NOVOLOG FLEXPEN 100 units/mL SOPN    nystatin (MYCOSTATIN) powder    omeprazole (PriLOSEC) 40 MG capsule    ondansetron (ZOFRAN) 4 mg tablet    potassium chloride (K-DUR,KLOR-CON) 20 mEq tablet    torsemide (DEMADEX) 20 mg tablet       Allergies   Allergen Reactions    Latex Rash           Physical Exam   Constitutional: Is oriented to person, place, and time  Appears well-developed and well-nourished  HENT: WNL  Head: Normocephalic and atraumatic  Eyes: Pupils are equal, round, and reactive to light  Neck: Normal range of motion  Neck supple  Cardiovascular: Normal rate and regular rhythm  Pulmonary/Chest: Effort normal and breath sounds normal    Abdominal: Soft  Bowel sounds are normal    Musculoskeletal: Normal range of motion  Extremities:  No edema  Neurological: Is alert and oriented to person, place, and time  Skin: Skin is warm and dry  Psychiatric: Has a normal mood and affect         Lab Results:   Admission on 09/12/2022   Component Date Value    WBC 09/12/2022 10 20 (A)    RBC 09/12/2022 3 88     Hemoglobin 09/12/2022 10 3 (A)    Hematocrit 09/12/2022 32 6 (A)    MCV 09/12/2022 84     MCH 09/12/2022 26 5 (A)    MCHC 09/12/2022 31 6     RDW 09/12/2022 18 7 (A)    Platelets 28/20/6083 130 (A)    nRBC 09/12/2022 1     Neutrophils Relative 09/12/2022 78 (A)    Immat GRANS % 09/12/2022 1     Lymphocytes Relative 09/12/2022 12 (A)    Monocytes Relative 09/12/2022 7     Eosinophils Relative 09/12/2022 2     Basophils Relative 09/12/2022 0     Neutrophils Absolute 09/12/2022 8 00 (A)    Immature Grans Absolute 09/12/2022 0 14     Lymphocytes Absolute 09/12/2022 1 19     Monocytes Absolute 09/12/2022 0 70     Eosinophils Absolute 09/12/2022 0 15     Basophils Absolute 09/12/2022 0 02  Sodium 09/12/2022 139     Potassium 09/12/2022 5 4 (A)    Chloride 09/12/2022 109 (A)    CO2 09/12/2022 10 (A)    ANION GAP 09/12/2022 20 (A)    BUN 09/12/2022 103 (A)    Creatinine 09/12/2022 8 44 (A)    Glucose 09/12/2022 91     Calcium 09/12/2022 8 5     Corrected Calcium 09/12/2022 9 1     AST 09/12/2022 17     ALT 09/12/2022 17     Alkaline Phosphatase 09/12/2022 135 (A)    Total Protein 09/12/2022 7 0     Albumin 09/12/2022 3 3 (A)    Total Bilirubin 09/12/2022 0 40     eGFR 09/12/2022 5     LACTIC ACID 09/12/2022 1 2     Procalcitonin 09/12/2022 0 60 (A)    Protime 09/12/2022 29 3 (A)    INR 09/12/2022 2 62 (A)    PTT 09/12/2022 47 (A)    Blood Culture 09/12/2022 Received in Microbiology Lab  Culture in Progress   Blood Culture 09/12/2022 Received in Microbiology Lab  Culture in Progress       Color, UA 09/12/2022 Yellow     Clarity, UA 09/12/2022 Clear     Specific Gravity, UA 09/12/2022 1 025     pH, UA 09/12/2022 5 0     Leukocytes, UA 09/12/2022 Negative     Nitrite, UA 09/12/2022 Negative     Protein, UA 09/12/2022 Negative     Glucose, UA 09/12/2022 Negative     Ketones, UA 09/12/2022 Negative     Urobilinogen, UA 09/12/2022 0 2     Bilirubin, UA 09/12/2022 Negative     Occult Blood, UA 09/12/2022 Negative     hs TnI 0hr 09/12/2022 55 (A)    hs TnI 2hr 09/12/2022 52 (A)    Delta 2hr hsTnI 09/12/2022 -3     Ventricular Rate 09/12/2022 72     Atrial Rate 09/12/2022 77     QRSD Interval 09/12/2022 130     QT Interval 09/12/2022 436     QTC Interval 09/12/2022 477     QRS Axis 09/12/2022 47     T Wave Axis 09/12/2022 61     hs TnI 4hr 09/12/2022 48     Delta 4hr hsTnI 09/12/2022 -7     Sodium 09/12/2022 139     Potassium 09/12/2022 5 0     Chloride 09/12/2022 111 (A)    CO2 09/12/2022 9 (A)    ANION GAP 09/12/2022 19 (A)    BUN 09/12/2022 104 (A)    Creatinine 09/12/2022 8 31 (A)    Glucose 09/12/2022 122     Calcium 09/12/2022 8 7     eGFR 09/12/2022 5     Legionella Urinary Antig* 09/12/2022 Negative     Strep pneumoniae antigen* 09/12/2022 Negative     Sodium 09/12/2022 141     Potassium 09/12/2022 5 4 (A)    Chloride 09/12/2022 111 (A)    CO2 09/12/2022 12 (A)    ANION GAP 09/12/2022 18 (A)    BUN 09/12/2022 104 (A)    Creatinine 09/12/2022 8 32 (A)    Glucose 09/12/2022 98     Calcium 09/12/2022 8 4     eGFR 09/12/2022 5     Magnesium 09/12/2022 1 1 (A)    Phosphorus 09/12/2022 5 4 (A)    POC Glucose 09/12/2022 486 (A)    POC Glucose 09/12/2022 135     Sodium 09/13/2022 141     Potassium 09/13/2022 4 8     Chloride 09/13/2022 111 (A)    CO2 09/13/2022 10 (A)    ANION GAP 09/13/2022 20 (A)    BUN 09/13/2022 104 (A)    Creatinine 09/13/2022 7 81 (A)    Glucose 09/13/2022 129     Calcium 09/13/2022 7 9 (A)    eGFR 09/13/2022 6     Procalcitonin 09/13/2022 0 62 (A)    WBC 09/13/2022 7 02     RBC 09/13/2022 3 18 (A)    Hemoglobin 09/13/2022 8 3 (A)    Hematocrit 09/13/2022 26 2 (A)    MCV 09/13/2022 82     MCH 09/13/2022 26 1 (A)    MCHC 09/13/2022 31 7     RDW 09/13/2022 18 7 (A)    MPV 09/13/2022 12 1     Platelets 33/86/5831 124 (A)    nRBC 09/13/2022 1     Neutrophils Relative 09/13/2022 73     Immat GRANS % 09/13/2022 1     Lymphocytes Relative 09/13/2022 14     Monocytes Relative 09/13/2022 9     Eosinophils Relative 09/13/2022 3     Basophils Relative 09/13/2022 0     Neutrophils Absolute 09/13/2022 5 13     Immature Grans Absolute 09/13/2022 0 08     Lymphocytes Absolute 09/13/2022 1 01     Monocytes Absolute 09/13/2022 0 61     Eosinophils Absolute 09/13/2022 0 18     Basophils Absolute 09/13/2022 0 01     Sodium 09/13/2022 141     Potassium 09/13/2022 4 9     Chloride 09/13/2022 110 (A)    CO2 09/13/2022 12 (A)    ANION GAP 09/13/2022 19 (A)    BUN 09/13/2022 101 (A)    Creatinine 09/13/2022 8 21 (A)    Glucose 09/13/2022 150 (A)    Calcium 09/13/2022 8 1 (A)    Corrected Calcium 09/13/2022 9 2     AST 09/13/2022 13     ALT 09/13/2022 16     Alkaline Phosphatase 09/13/2022 106     Total Protein 09/13/2022 5 8 (A)    Albumin 09/13/2022 2 6 (A)    Total Bilirubin 09/13/2022 0 60     eGFR 09/13/2022 5     Magnesium 09/13/2022 1 6     Phosphorus 09/13/2022 5 4 (A)    Protime 09/13/2022 32 0 (A)    INR 09/13/2022 2 93 (A)    Lipase 09/13/2022 691 (A)    POC Glucose 09/13/2022 154 (A)    PTT 09/13/2022 57 (A)    Protime 09/13/2022 31 4 (A)    INR 09/13/2022 2 86 (A)     Imaging Studies: I have personally reviewed pertinent reports  EKG, Pathology, and Other Studies: I have personally reviewed pertinent reports  Counseling / Coordination of Care  Total floor / unit time spent today 20 minutes

## 2022-09-13 NOTE — ASSESSMENT & PLAN NOTE
Lab Results   Component Value Date    EGFR 5 09/12/2022    EGFR 5 09/12/2022    EGFR 20 08/10/2022    CREATININE 8 31 (H) 09/12/2022    CREATININE 8 44 (H) 09/12/2022    CREATININE 2 96 (H) 08/10/2022     · Acute on chronic kidney disease creatinine is 8 31 continue to monitor  · Bicarb drip at 100 an hour  · Q 6 hours labs

## 2022-09-13 NOTE — PROGRESS NOTES
[place]  Progress Note - Rito Alpers 1950, 73 y o  male MRN: [id number]  Unit/Bed#: -01 Encounter: [id number]  Primary Care Provider: Margarette Crabtree MD   Date and time admitted to hospital: 9/12/2022  4:09 PM    Acute diverticulitis  Assessment & Plan  Patient is a 42-year-old male with an extensive medical history recently admitted from 08/1 to 8/10 for acute diverticulitis was on Rocephin and Flagyl, also had an ERCP and a common bile duct stent placed  Who comes in today again with nausea vomiting and diarrhea for several days     · CT of the abdomen and pelvis shows acute sigmoid diverticulitis without drainable fluid collection compared also with to studies on 08/01 it is decreased, also shows common bile duct stent with normal mood bili and air within the gallbladder, distended gallbladder with cholelithiasis concern for acute cholecystitis also thickened and under distended urinary bladder possible cystitis  · Started on Zosyn 3 375 g IV Q 6 hours  · GI and General surgery consult for further evaluation of cholecystitis  · Continue to monitor end points   · C diff check on diarrhea due to recent use of antibiotics  · Ultrasound right upper quadrant      * DORA (acute kidney injury) ([facility] [address] )  Assessment & Plan  · Patient has acute kidney injury with a creatinine of 8 1 and a metabolic acidosis most likely related to renal failure  · Nephrology,  · Hyperkalemia was treated with dextrose bicarb calcium and insulin last result 4 8   · Last admission patient arrived with for creatinine of 5 97 which resolved to 2 96  · Patient continues to make urine marilu 35 ml per hour   · Discussion about possible dialysis when patient does not improved by himself - to clear acidosis   · Q 6 hours labs      Cholecystitis, acute  Assessment & Plan  Patient's CT scan showed possible acute cholecystitis with pneumobilia and air   Continue on Zosyn q 6  Monitor endpoint  Procalcitonin in the a m  Blood cultures obtained  GI and surgery are consulted     Hyperkalemia  Assessment & Plan  · Patient was treated with bicarb calcium insulin and dextrose for hyperkalemia  · BMP q 6 hours  · Nephrology is consult    Acute on chronic combined systolic and diastolic congestive heart failure (HCC)  Assessment & Plan  Wt Readings from Last 3 Encounters:   09/12/22 115 kg (253 lb 8 5 oz)   09/06/22 117 kg (257 lb 15 oz)   08/30/22 118 kg (260 lb)     Patient has an EF of 40-45% on last admission was volume overloaded and received Lasix  Patient is on torsemide at baseline  Hold torsemide for now        Stage 3b chronic kidney disease [facility])  Assessment & Plan  Lab Results   Component Value Date    EGFR 6 09/13/2022    EGFR 5 09/12/2022    EGFR 5 09/12/2022    CREATININE 7 81 (H) 09/13/2022    CREATININE 8 32 (H) 09/12/2022    CREATININE 8 31 (H) 09/12/2022     Patient had outpatient labs done which showed a creatinine of 7 0 on last admission creatinine was 5 76 which improved to around 2 96 on discharge  Creatinine now is 8 44 most likely related again due to dehydration and diarrhea  Nephrology is consulted  Discussion about dialysis may need to be made  Patient still makes urine  Due to severe metabolic acidosis started on bicarb drip at 105 an hour  Q 6 hours BMP Mag and phos    Chronic atrial fibrillation [facility])  Assessment & Plan  Patient is on Coumadin his INR was 2 56  Will hold Coumadin for now, start on heparin in the a m   Until planned for surgery verses IR procedure  Patient is well controlled on last admission was on metoprolol and went into to 20 with his heart rate received atropine with good response    Morbid obesity ([address] )  Assessment & Plan  Encourage nutrition counseling lifestyle changes and weight loss    Hyperlipidemia  Assessment & Plan  Continue Lipitor    Multiple myeloma [facility])  Assessment & Plan  Patient has a diagnosis of multiple myeloma not having achieved remission at this time last bone marrow biopsy was positive patient's chemotherapy - Velcade and Decadron  · Is seen by outpatient Oncology 8/19/2022  · Last chemotherapy infusion was 9/6/2022    Ambulatory dysfunction  Assessment & Plan  History of the left AKA    Chronic obstructive pulmonary disease, unspecified ([facility] [address] )  Assessment & Plan  Patient has chronic COPD is on 2 L of oxygen as needed in the nursing  Does not appear to have acute exacerbation  Continue Advair and albuterol    GERD (gastroesophageal reflux disease)  Assessment & Plan  Continue Protonix    NALLELY (obstructive sleep apnea)  Assessment & Plan  History of obstructive sleep apnea noncompliance with CPAP    Increased anion gap metabolic acidosis  Assessment & Plan  · Bicarb is at 10 on the BMP and a anion gap is at 20  · Most likely related due to renal failure  · Continue to monitor closely    CKD (chronic kidney disease)  Assessment & Plan  Lab Results   Component Value Date    EGFR 6 09/13/2022    EGFR 5 09/12/2022    EGFR 5 09/12/2022    CREATININE 7 81 (H) 09/13/2022    CREATININE 8 32 (H) 09/12/2022    CREATININE 8 31 (H) 09/12/2022     · Acute on chronic kidney disease creatinine is 8 31 continue to monitor  · Bicarb drip at 100 an hour  · Q 6 hours labs    Diabetes mellitus, type 2 (HCC)  Assessment & Plan  Lab Results   Component Value Date    HGBA1C 6 3 (H) 08/05/2022       Recent Labs     09/12/22 2324 09/12/22 2325   POCGLU 486* 135       Blood Sugar Average: Last 72 hrs:  (P) 310 5 patient is NPO at this time  Continue insulin sliding scale   Continue  bedtime insulin 10 units  Hold Victoza  If needed start insulin drip      ----------------------------------------------------------------------------------------  HPI/24hr events:   Patient is a 73-year-old male with an extensive medical history who comes in after several days of nausea vomiting and diarrhea significant DORA with a creatinine of 8 3  · Nephrology Gastroenterology and surgery were consulted  · Started on rehydration therapy with sodium bicarb at 100 ml/per hour and changed at 2 am to 125 ml/per hour - creatine corrects slowly to 7 8   · May need dialysis for acidosis    Patient appropriate for transfer out of the ICU today?: No  Disposition: Continue Stepdown Level 1 level of care   Code Status: Level 1 - Full Code  ---------------------------------------------------------------------------------------  SUBJECTIVE  Patient still complains of abdominal pain    Review of Systems   Constitutional: Positive for appetite change and fatigue  HENT: Negative  Eyes: Negative  Respiratory: Positive for shortness of breath  Cardiovascular: Positive for leg swelling  Gastrointestinal: Positive for abdominal pain, diarrhea, nausea and vomiting  Endocrine: Negative  Genitourinary: Positive for decreased urine volume  Musculoskeletal: Negative  Allergic/Immunologic: Negative  Neurological: Positive for weakness  Hematological: Negative  Review of systems was reviewed and negative unless stated above in HPI/24-hour events   ---------------------------------------------------------------------------------------  OBJECTIVE    Vitals   Vitals:    22 1617 22 1832 22   BP:  131/68 140/63    BP Location:   Right arm    Pulse:  77 78    Resp:  19 18    Temp:       TempSrc:       SpO2: 100% 100% 100%    Weight:    115 kg (253 lb 8 5 oz)   Height:         Temp (24hrs), Av 2 °F (36 8 °C), Min:98 2 °F (36 8 °C), Max:98 2 °F (36 8 °C)  Current: Temperature: 98 2 °F (36 8 °C)          Respiratory:  SpO2: SpO2: 100 %, SpO2 Activity: SpO2 Activity: At Rest, SpO2 Device: O2 Device: None (Room air)       Invasive/non-invasive ventilation settings   Respiratory  Report   Lab Data (Last 4 hours)    None         O2/Vent Data (Last 4 hours)    None                Physical Exam  Vitals and nursing note reviewed     Constitutional:       Appearance: He is well-developed  He is obese  He is ill-appearing  HENT:      Head: Normocephalic and atraumatic  Eyes:      Pupils: Pupils are equal, round, and reactive to light  Cardiovascular:      Rate and Rhythm: Normal rate and regular rhythm  Pulses: Normal pulses  Heart sounds: Normal heart sounds  Pulmonary:      Effort: Pulmonary effort is normal       Breath sounds: Normal breath sounds  Abdominal:      General: Abdomen is protuberant  Bowel sounds are normal  There is distension  Palpations: Abdomen is soft  Tenderness: There is generalized abdominal tenderness and tenderness in the right upper quadrant and left lower quadrant  There is no guarding  Genitourinary:     Penis: Normal     Musculoskeletal:         General: Normal range of motion  Cervical back: Normal range of motion and neck supple  Right lower leg: Edema present  Left lower leg: Edema present  Skin:     General: Skin is warm and dry  Capillary Refill: Capillary refill takes less than 2 seconds  Coloration: Skin is pale  Neurological:      Mental Status: He is alert and oriented to person, place, and time     Psychiatric:         Behavior: Behavior normal              Laboratory and Diagnostics:  Results from last 7 days   Lab Units 09/12/22  1658   WBC Thousand/uL 10 20*   HEMOGLOBIN g/dL 10 3*   HEMATOCRIT % 32 6*   PLATELETS Thousands/uL 130*   NEUTROS PCT % 78*   MONOS PCT % 7     Results from last 7 days   Lab Units 09/13/22  0107 09/12/22  2157 09/12/22 1958 09/12/22  1658   SODIUM mmol/L 141 141 139 139   POTASSIUM mmol/L 4 8 5 4* 5 0 5 4*   CHLORIDE mmol/L 111* 111* 111* 109*   CO2 mmol/L 10* 12* 9* 10*   ANION GAP mmol/L 20* 18* 19* 20*   BUN mg/dL 104* 104* 104* 103*   CREATININE mg/dL 7 81* 8 32* 8 31* 8 44*   CALCIUM mg/dL 7 9* 8 4 8 7 8 5   GLUCOSE RANDOM mg/dL 129 98 122 91   ALT U/L  --   --   --  17   AST U/L  --   --   --  17   ALK PHOS U/L  --   --   --  135*   ALBUMIN g/dL  -- --   --  3 3*   TOTAL BILIRUBIN mg/dL  --   --   --  0 40     Results from last 7 days   Lab Units 09/12/22  2157   MAGNESIUM mg/dL 1 1*   PHOSPHORUS mg/dL 5 4*      Results from last 7 days   Lab Units 09/12/22  1658   INR  2 62*   PTT seconds 47*          Results from last 7 days   Lab Units 09/12/22  1658   LACTIC ACID mmol/L 1 2     ABG:    VBG:    Results from last 7 days   Lab Units 09/12/22  1658   PROCALCITONIN ng/ml 0 60*       Micro  Results from last 7 days   Lab Units 09/12/22  1658   BLOOD CULTURE  Received in Microbiology Lab  Culture in Progress  Received in Microbiology Lab  Culture in Progress  EKG: a fib  Imaging: I have personally reviewed pertinent reports  Intake and Output  I/O       09/11 0701 09/12 0700 09/12 0701 09/13 0700    P  O   120    IV Piggyback  1050    Total Intake(mL/kg)  1170 (10 2)    Urine (mL/kg/hr)  105    Total Output  105    Net  +1065                Height and Weights   Height: 6' 3" (190 5 cm)  IBW (Ideal Body Weight): 84 5 kg  Body mass index is 31 69 kg/m²  Weight (last 2 days)     Date/Time Weight    09/12/22 2052 115 (253 53)    09/12/22 1615 109 (240)            Nutrition       Diet Orders   (From admission, onward)             Start     Ordered    09/12/22 2045  Diet NPO; Sips with meds  Diet effective now        References:    Nutrtion Support Algorithm Enteral vs  Parenteral   Question Answer Comment   Diet Type NPO    NPO Except: Sips with meds    RD to adjust diet per protocol?  No        09/12/22 2051                  Active Medications  Scheduled Meds:  Current Facility-Administered Medications   Medication Dose Route Frequency Provider Last Rate    albuterol  2 puff Inhalation Q6H PRN Julia Parr PA-C      allopurinol  300 mg Oral Daily Julia Parr PA-C      atorvastatin  40 mg Oral After D R  [employer]LE Parr PA-C      clotrimazole   Topical BID Julia Parr PA-C      fluticasone-vilanterol  1 puff Inhalation Daily Julia GALVAN JOEY Parr      insulin glargine  10 Units Subcutaneous HS Julia GALVAN JOEY Parr      insulin lispro  1-6 Units Subcutaneous Q6H [address] Neoevelyn GALVAN JOEY Parr      melatonin  6 mg Oral HS Julia GALVAN JOEY Parr      pantoprazole  40 mg Oral Early Morning Julia GALVAN JOEY Parr      piperacillin-tazobactam  3 375 g Intravenous Q12H Julia GALVAN JOEY Parr 25 mL/hr at 09/13/22 0401    sodium bicarbonate infusion  125 mL/hr Intravenous Continuous Neoevelyn GALVAN JOEY Parr 125 mL/hr (09/13/22 0401)     Continuous Infusions:  sodium bicarbonate infusion, 125 mL/hr, Last Rate: 125 mL/hr (09/13/22 0401)      PRN Meds:   albuterol, 2 puff, Q6H PRN        Invasive Devices Review  Invasive Devices  Report    Peripheral Intravenous Line  Duration           Peripheral IV 09/12/22 Dorsal (posterior); Right Forearm <1 day    Peripheral IV 09/12/22 Right Antecubital <1 day          Drain  Duration           Urethral Catheter 14 Fr  <1 day                Rationale for remaining devices:   ---------------------------------------------------------------------------------------  Advance Directive and Living Will:      Power of :    POLST:    ---------------------------------------------------------------------------------------  Care Time Delivered:   Upon my evaluation, this patient had a high probability of imminent or life-threatening deterioration due to Patient with acute diverticulitis possible acute cholecystitis and acute renal failure with a creatinine of 7 8, which required my direct attention, intervention, and personal management  I have personally provided 45 minutes (0200 to [phone]) of critical care time, exclusive of procedures, teaching, family meetings, and any prior time recorded by providers other than myself  Julia Parr PA-C      Portions of the record may have been created with voice recognition software    Occasional wrong word or "sound a like" substitutions may have occurred due to the inherent limitations of voice recognition software    Read the chart carefully and recognize, using context, where substitutions have occurred

## 2022-09-13 NOTE — ASSESSMENT & PLAN NOTE
Lab Results   Component Value Date    EGFR 5 09/12/2022    EGFR 20 08/10/2022    EGFR 18 08/09/2022    CREATININE 8 44 (H) 09/12/2022    CREATININE 2 96 (H) 08/10/2022    CREATININE 3 30 (H) 08/09/2022     Patient had outpatient labs done which showed a creatinine of 7 0 on last admission creatinine was 5 76 which improved to around 2 96 on discharge  Creatinine now is 8 44 most likely related again due to dehydration and diarrhea  Nephrology is consulted  Discussion about dialysis may need to be made  Patient still makes urine  Due to severe metabolic acidosis started on bicarb drip at 100 an hour  Q 6 hours BMP Mag and phos

## 2022-09-13 NOTE — ASSESSMENT & PLAN NOTE
· Patient has acute kidney injury with a creatinine of 8 1 and a metabolic acidosis most likely related to renal failure  · Nephrology,  · Hyperkalemia was treated with dextrose bicarb calcium and insulin last result 4 8   · Last admission patient arrived with for creatinine of 5 97 which resolved to 2 96  · Patient continues to make urine marilu 35 ml per hour   · Discussion about possible dialysis when patient does not improved by himself - to clear acidosis   · Q 6 hours labs

## 2022-09-13 NOTE — UTILIZATION REVIEW
Inpatient Admission Authorization Request   NOTIFICATION OF INPATIENT ADMISSION/INPATIENT AUTHORIZATION REQUEST   SERVICING FACILITY:   [address] [address]  Tax ID: [id number]  NPI: [id number]  Place of Service: Inpatient [facility]  [address]  Place of Service Code: 24     ATTENDING PROVIDER:  Attending Name and NPI#: Jomar Walters  Address: [address] [place] [address]  Phone: [phone]     UTILIZATION REVIEW CONTACT:  Kirsten Betts Utilization   Network Utilization Review Department  Phone: [phone]  Fax [phone]  Email: [email or web address]     PHYSICIAN ADVISORY SERVICES:  FOR [id number] REVIEW - MEDICAL NECESSITY DENIAL  Phone: [phone]  Fax: [phone]  Email: [email or web address]     TYPE OF REQUEST:  Inpatient Status     ADMISSION INFORMATION:  ADMISSION DATE/TIME: 9/12/22  7:38 PM  PATIENT DIAGNOSIS CODE/DESCRIPTION:  Atrial fibrillation (HCC) [I48 91]  Cholelithiasis [K80 20]  Hyperkalemia [E87 5]  Diverticulitis [K57 92]  ARF (acute renal failure) (HCC) [N17 9]  Abdominal pain [R10 9]  Abnormal laboratory test result [R89 9]  Pneumobilia [K83 8]  Nausea vomiting and diarrhea [R11 2, R19 7]  Acute diverticulitis [K57 92]  DISCHARGE DATE/TIME: No discharge date for patient encounter  IMPORTANT INFORMATION:  Please contact Kirsten Betts directly with any questions or concerns regarding this request  Department voicemails are confidential     Send requests for admission clinical reviews, concurrent reviews, approvals, and administrative denials due to lack of clinical to fax [phone]

## 2022-09-13 NOTE — ASSESSMENT & PLAN NOTE
Wt Readings from Last 3 Encounters:   09/12/22 109 kg (240 lb)   09/06/22 117 kg (257 lb 15 oz)   08/30/22 118 kg (260 lb)     Patient has an EF of 40-45% on last admission was volume overloaded and received Lasix  Patient is on torsemide at baseline  Hold torsemide for now

## 2022-09-13 NOTE — ASSESSMENT & PLAN NOTE
Lab Results   Component Value Date    HGBA1C 6 3 (H) 08/05/2022       No results for input(s): POCGLU in the last 72 hours      Blood Sugar Average: Last 72 hrs:   patient is NPO at this time  Continue insulin sliding scale   Continue bowel bedtime insulin 10 units  Hold Victoza  If needed start insulin drip

## 2022-09-14 ENCOUNTER — APPOINTMENT (OUTPATIENT)
Dept: ULTRASOUND IMAGING | Facility: HOSPITAL | Age: 72
DRG: 674 | End: 2022-09-14
Payer: COMMERCIAL

## 2022-09-14 PROBLEM — E87.5 HYPERKALEMIA: Status: RESOLVED | Noted: 2022-01-01 | Resolved: 2022-01-01

## 2022-09-14 LAB
ALBUMIN SERPL BCP-MCNC: 2.3 G/DL (ref 3.5–5)
ALP SERPL-CCNC: 92 U/L (ref 46–116)
ALT SERPL W P-5'-P-CCNC: 14 U/L (ref 12–78)
ANION GAP SERPL CALCULATED.3IONS-SCNC: 16 MMOL/L (ref 4–13)
ANION GAP SERPL CALCULATED.3IONS-SCNC: 17 MMOL/L (ref 4–13)
APTT PPP: 65 SECONDS (ref 23–37)
APTT PPP: 72 SECONDS (ref 23–37)
AST SERPL W P-5'-P-CCNC: 14 U/L (ref 5–45)
ATRIAL RATE: 208 BPM
ATRIAL RATE: 77 BPM
BASOPHILS # BLD AUTO: 0.01 THOUSANDS/ΜL (ref 0–0.1)
BASOPHILS # BLD AUTO: 0.01 THOUSANDS/ΜL (ref 0–0.1)
BASOPHILS NFR BLD AUTO: 0 % (ref 0–1)
BASOPHILS NFR BLD AUTO: 0 % (ref 0–1)
BILIRUB DIRECT SERPL-MCNC: 0.17 MG/DL (ref 0–0.2)
BILIRUB SERPL-MCNC: 0.6 MG/DL (ref 0.2–1)
BUN SERPL-MCNC: 100 MG/DL (ref 5–25)
BUN SERPL-MCNC: 101 MG/DL (ref 5–25)
BUN SERPL-MCNC: 103 MG/DL (ref 5–25)
BUN SERPL-MCNC: 98 MG/DL (ref 5–25)
C DIFF TOX GENS STL QL NAA+PROBE: NEGATIVE
CA-I BLD-SCNC: 1.01 MMOL/L (ref 1.12–1.32)
CALCIUM SERPL-MCNC: 7.7 MG/DL (ref 8.3–10.1)
CALCIUM SERPL-MCNC: 7.8 MG/DL (ref 8.3–10.1)
CALCIUM SERPL-MCNC: 8 MG/DL (ref 8.3–10.1)
CALCIUM SERPL-MCNC: 8.1 MG/DL (ref 8.3–10.1)
CHLORIDE SERPL-SCNC: 102 MMOL/L (ref 96–108)
CHLORIDE SERPL-SCNC: 102 MMOL/L (ref 96–108)
CHLORIDE SERPL-SCNC: 103 MMOL/L (ref 96–108)
CHLORIDE SERPL-SCNC: 105 MMOL/L (ref 96–108)
CO2 SERPL-SCNC: 18 MMOL/L (ref 21–32)
CO2 SERPL-SCNC: 20 MMOL/L (ref 21–32)
CREAT SERPL-MCNC: 8.06 MG/DL (ref 0.6–1.3)
CREAT SERPL-MCNC: 8.18 MG/DL (ref 0.6–1.3)
CREAT SERPL-MCNC: 8.26 MG/DL (ref 0.6–1.3)
CREAT SERPL-MCNC: 8.45 MG/DL (ref 0.6–1.3)
EOSINOPHIL # BLD AUTO: 0.23 THOUSAND/ΜL (ref 0–0.61)
EOSINOPHIL # BLD AUTO: 0.25 THOUSAND/ΜL (ref 0–0.61)
EOSINOPHIL NFR BLD AUTO: 3 % (ref 0–6)
EOSINOPHIL NFR BLD AUTO: 4 % (ref 0–6)
ERYTHROCYTE [DISTWIDTH] IN BLOOD BY AUTOMATED COUNT: 18.6 % (ref 11.6–15.1)
ERYTHROCYTE [DISTWIDTH] IN BLOOD BY AUTOMATED COUNT: 18.7 % (ref 11.6–15.1)
GFR SERPL CREATININE-BSD FRML MDRD: 5 ML/MIN/1.73SQ M
GFR SERPL CREATININE-BSD FRML MDRD: 5 ML/MIN/1.73SQ M
GFR SERPL CREATININE-BSD FRML MDRD: 6 ML/MIN/1.73SQ M
GFR SERPL CREATININE-BSD FRML MDRD: 6 ML/MIN/1.73SQ M
GLUCOSE SERPL-MCNC: 117 MG/DL (ref 65–140)
GLUCOSE SERPL-MCNC: 126 MG/DL (ref 65–140)
GLUCOSE SERPL-MCNC: 132 MG/DL (ref 65–140)
GLUCOSE SERPL-MCNC: 132 MG/DL (ref 65–140)
GLUCOSE SERPL-MCNC: 141 MG/DL (ref 65–140)
GLUCOSE SERPL-MCNC: 146 MG/DL (ref 65–140)
GLUCOSE SERPL-MCNC: 146 MG/DL (ref 65–140)
GLUCOSE SERPL-MCNC: 153 MG/DL (ref 65–140)
HCT VFR BLD AUTO: 21 % (ref 36.5–49.3)
HCT VFR BLD AUTO: 23.7 % (ref 36.5–49.3)
HGB BLD-MCNC: 7 G/DL (ref 12–17)
HGB BLD-MCNC: 7.8 G/DL (ref 12–17)
IMM GRANULOCYTES # BLD AUTO: 0.06 THOUSAND/UL (ref 0–0.2)
IMM GRANULOCYTES # BLD AUTO: 0.06 THOUSAND/UL (ref 0–0.2)
IMM GRANULOCYTES NFR BLD AUTO: 1 % (ref 0–2)
IMM GRANULOCYTES NFR BLD AUTO: 1 % (ref 0–2)
INR PPP: 2.61 (ref 0.84–1.19)
LYMPHOCYTES # BLD AUTO: 0.93 THOUSANDS/ΜL (ref 0.6–4.47)
LYMPHOCYTES # BLD AUTO: 1.04 THOUSANDS/ΜL (ref 0.6–4.47)
LYMPHOCYTES NFR BLD AUTO: 15 % (ref 14–44)
LYMPHOCYTES NFR BLD AUTO: 15 % (ref 14–44)
MAGNESIUM SERPL-MCNC: 1.9 MG/DL (ref 1.6–2.6)
MAGNESIUM SERPL-MCNC: 2.1 MG/DL (ref 1.6–2.6)
MAGNESIUM SERPL-MCNC: 2.2 MG/DL (ref 1.6–2.6)
MAGNESIUM SERPL-MCNC: 2.3 MG/DL (ref 1.6–2.6)
MCH RBC QN AUTO: 26.8 PG (ref 26.8–34.3)
MCH RBC QN AUTO: 27 PG (ref 26.8–34.3)
MCHC RBC AUTO-ENTMCNC: 32.9 G/DL (ref 31.4–37.4)
MCHC RBC AUTO-ENTMCNC: 33.3 G/DL (ref 31.4–37.4)
MCV RBC AUTO: 81 FL (ref 82–98)
MCV RBC AUTO: 81 FL (ref 82–98)
MONOCYTES # BLD AUTO: 0.6 THOUSAND/ΜL (ref 0.17–1.22)
MONOCYTES # BLD AUTO: 0.65 THOUSAND/ΜL (ref 0.17–1.22)
MONOCYTES NFR BLD AUTO: 9 % (ref 4–12)
MONOCYTES NFR BLD AUTO: 9 % (ref 4–12)
NEUTROPHILS # BLD AUTO: 4.56 THOUSANDS/ΜL (ref 1.85–7.62)
NEUTROPHILS # BLD AUTO: 5.11 THOUSANDS/ΜL (ref 1.85–7.62)
NEUTS SEG NFR BLD AUTO: 71 % (ref 43–75)
NEUTS SEG NFR BLD AUTO: 72 % (ref 43–75)
NRBC BLD AUTO-RTO: 1 /100 WBCS
NRBC BLD AUTO-RTO: 1 /100 WBCS
PHOSPHATE SERPL-MCNC: 6.1 MG/DL (ref 2.3–4.1)
PLATELET # BLD AUTO: 123 THOUSANDS/UL (ref 149–390)
PLATELET # BLD AUTO: 135 THOUSANDS/UL (ref 149–390)
PMV BLD AUTO: 11.8 FL (ref 8.9–12.7)
PMV BLD AUTO: 12.1 FL (ref 8.9–12.7)
POTASSIUM SERPL-SCNC: 3.2 MMOL/L (ref 3.5–5.3)
POTASSIUM SERPL-SCNC: 3.3 MMOL/L (ref 3.5–5.3)
POTASSIUM SERPL-SCNC: 3.5 MMOL/L (ref 3.5–5.3)
POTASSIUM SERPL-SCNC: 3.6 MMOL/L (ref 3.5–5.3)
PROT SERPL-MCNC: 5.2 G/DL (ref 6.4–8.4)
PROTHROMBIN TIME: 29.3 SECONDS (ref 11.6–14.5)
QRS AXIS: 47 DEGREES
QRS AXIS: 66 DEGREES
QRSD INTERVAL: 130 MS
QRSD INTERVAL: 132 MS
QT INTERVAL: 436 MS
QT INTERVAL: 508 MS
QTC INTERVAL: 477 MS
QTC INTERVAL: 498 MS
RBC # BLD AUTO: 2.59 MILLION/UL (ref 3.88–5.62)
RBC # BLD AUTO: 2.91 MILLION/UL (ref 3.88–5.62)
SODIUM SERPL-SCNC: 139 MMOL/L (ref 135–147)
SODIUM SERPL-SCNC: 140 MMOL/L (ref 135–147)
T WAVE AXIS: 61 DEGREES
T WAVE AXIS: 88 DEGREES
VENTRICULAR RATE: 58 BPM
VENTRICULAR RATE: 72 BPM
WBC # BLD AUTO: 6.41 THOUSAND/UL (ref 4.31–10.16)
WBC # BLD AUTO: 7.1 THOUSAND/UL (ref 4.31–10.16)

## 2022-09-14 PROCEDURE — 85610 PROTHROMBIN TIME: CPT | Performed by: PHYSICIAN ASSISTANT

## 2022-09-14 PROCEDURE — 93010 ELECTROCARDIOGRAM REPORT: CPT | Performed by: INTERNAL MEDICINE

## 2022-09-14 PROCEDURE — 85025 COMPLETE CBC W/AUTO DIFF WBC: CPT

## 2022-09-14 PROCEDURE — 85025 COMPLETE CBC W/AUTO DIFF WBC: CPT | Performed by: PHYSICIAN ASSISTANT

## 2022-09-14 PROCEDURE — 99232 SBSQ HOSP IP/OBS MODERATE 35: CPT | Performed by: INTERNAL MEDICINE

## 2022-09-14 PROCEDURE — 80048 BASIC METABOLIC PNL TOTAL CA: CPT | Performed by: PHYSICIAN ASSISTANT

## 2022-09-14 PROCEDURE — 85730 THROMBOPLASTIN TIME PARTIAL: CPT | Performed by: PHYSICIAN ASSISTANT

## 2022-09-14 PROCEDURE — 80048 BASIC METABOLIC PNL TOTAL CA: CPT

## 2022-09-14 PROCEDURE — 82330 ASSAY OF CALCIUM: CPT

## 2022-09-14 PROCEDURE — 84100 ASSAY OF PHOSPHORUS: CPT

## 2022-09-14 PROCEDURE — 99233 SBSQ HOSP IP/OBS HIGH 50: CPT | Performed by: INTERNAL MEDICINE

## 2022-09-14 PROCEDURE — 83735 ASSAY OF MAGNESIUM: CPT | Performed by: PHYSICIAN ASSISTANT

## 2022-09-14 PROCEDURE — 82948 REAGENT STRIP/BLOOD GLUCOSE: CPT

## 2022-09-14 PROCEDURE — 83735 ASSAY OF MAGNESIUM: CPT

## 2022-09-14 PROCEDURE — 80076 HEPATIC FUNCTION PANEL: CPT | Performed by: PHYSICIAN ASSISTANT

## 2022-09-14 PROCEDURE — 99291 CRITICAL CARE FIRST HOUR: CPT | Performed by: PHYSICIAN ASSISTANT

## 2022-09-14 PROCEDURE — 76705 ECHO EXAM OF ABDOMEN: CPT

## 2022-09-14 RX ORDER — MAGNESIUM SULFATE HEPTAHYDRATE 40 MG/ML
2 INJECTION, SOLUTION INTRAVENOUS ONCE
Status: COMPLETED | OUTPATIENT
Start: 2022-09-14 | End: 2022-09-14

## 2022-09-14 RX ORDER — CALCIUM GLUCONATE 20 MG/ML
2 INJECTION, SOLUTION INTRAVENOUS ONCE
Status: COMPLETED | OUTPATIENT
Start: 2022-09-14 | End: 2022-09-14

## 2022-09-14 RX ORDER — POTASSIUM CHLORIDE 20 MEQ/1
20 TABLET, EXTENDED RELEASE ORAL ONCE
Status: COMPLETED | OUTPATIENT
Start: 2022-09-14 | End: 2022-09-14

## 2022-09-14 RX ORDER — POTASSIUM CHLORIDE 20 MEQ/1
40 TABLET, EXTENDED RELEASE ORAL ONCE
Status: COMPLETED | OUTPATIENT
Start: 2022-09-14 | End: 2022-09-14

## 2022-09-14 RX ADMIN — METOLAZONE 10 MG: 2.5 TABLET ORAL at 23:57

## 2022-09-14 RX ADMIN — PIPERACILLIN AND TAZOBACTAM 3.38 G: 3; .375 INJECTION, POWDER, LYOPHILIZED, FOR SOLUTION INTRAVENOUS at 01:14

## 2022-09-14 RX ADMIN — FLUTICASONE FUROATE AND VILANTEROL TRIFENATATE 1 PUFF: 200; 25 POWDER RESPIRATORY (INHALATION) at 08:23

## 2022-09-14 RX ADMIN — INSULIN LISPRO 1 UNITS: 100 INJECTION, SOLUTION INTRAVENOUS; SUBCUTANEOUS at 06:43

## 2022-09-14 RX ADMIN — ATORVASTATIN CALCIUM 40 MG: 40 TABLET, FILM COATED ORAL at 18:05

## 2022-09-14 RX ADMIN — POTASSIUM CHLORIDE 20 MEQ: 20 TABLET, EXTENDED RELEASE ORAL at 21:01

## 2022-09-14 RX ADMIN — MAGNESIUM SULFATE HEPTAHYDRATE 2 G: 40 INJECTION, SOLUTION INTRAVENOUS at 06:45

## 2022-09-14 RX ADMIN — METOLAZONE 10 MG: 2.5 TABLET ORAL at 18:05

## 2022-09-14 RX ADMIN — CLOTRIMAZOLE: 0.01 CREAM TOPICAL at 18:06

## 2022-09-14 RX ADMIN — CLOTRIMAZOLE: 0.01 CREAM TOPICAL at 08:24

## 2022-09-14 RX ADMIN — MAGNESIUM SULFATE HEPTAHYDRATE 2 G: 40 INJECTION, SOLUTION INTRAVENOUS at 01:15

## 2022-09-14 RX ADMIN — METOLAZONE 10 MG: 2.5 TABLET ORAL at 06:42

## 2022-09-14 RX ADMIN — Medication 40 MG/HR: at 10:10

## 2022-09-14 RX ADMIN — HEPARIN SODIUM 5.1 UNITS/KG/HR: 10000 INJECTION, SOLUTION INTRAVENOUS at 21:04

## 2022-09-14 RX ADMIN — Medication 6 MG: at 21:01

## 2022-09-14 RX ADMIN — CALCIUM GLUCONATE 2 G: 20 INJECTION, SOLUTION INTRAVENOUS at 06:46

## 2022-09-14 RX ADMIN — SODIUM BICARBONATE 125 ML/HR: 84 INJECTION, SOLUTION INTRAVENOUS at 17:53

## 2022-09-14 RX ADMIN — POTASSIUM CHLORIDE 40 MEQ: 20 TABLET, EXTENDED RELEASE ORAL at 06:42

## 2022-09-14 RX ADMIN — METOLAZONE 10 MG: 2.5 TABLET ORAL at 12:16

## 2022-09-14 RX ADMIN — Medication 40 MG/HR: at 22:37

## 2022-09-14 RX ADMIN — POTASSIUM CHLORIDE 20 MEQ: 20 TABLET, EXTENDED RELEASE ORAL at 13:57

## 2022-09-14 RX ADMIN — INSULIN GLARGINE 10 UNITS: 100 INJECTION, SOLUTION SUBCUTANEOUS at 21:01

## 2022-09-14 RX ADMIN — PIPERACILLIN AND TAZOBACTAM 3.38 G: 3; .375 INJECTION, POWDER, LYOPHILIZED, FOR SOLUTION INTRAVENOUS at 13:57

## 2022-09-14 RX ADMIN — SODIUM BICARBONATE 125 ML/HR: 84 INJECTION, SOLUTION INTRAVENOUS at 08:26

## 2022-09-14 RX ADMIN — PANTOPRAZOLE SODIUM 40 MG: 40 TABLET, DELAYED RELEASE ORAL at 06:42

## 2022-09-14 RX ADMIN — ALLOPURINOL 300 MG: 300 TABLET ORAL at 08:23

## 2022-09-14 NOTE — ASSESSMENT & PLAN NOTE
Patient's CT scan showed possible acute cholecystitis with pneumobilia and air   Continue on Zosyn q 6  Monitor endpoint  Procalcitonin in the a m    Blood cultures obtained  GI and surgery -  If concern for acute cholecystitis with probably be IR procedure not a candidate for surgery   Ultrasound of right upper quadrant still pending  No Lentz sign no right upper quadrant tenderness stent already in place

## 2022-09-14 NOTE — PROGRESS NOTES
Progress note - Gastroenterology   Tanesha Rosario 73 y o  male MRN: [id number]  Unit/Bed#: -01 Encounter: [id number]    ASSESSMENT and PLAN    1  Acute diverticulitis  2  Nausea, vomiting, diarrhea  Patient was recently admitted in the beginning of August for acute diverticulitis  Colonoscopy 7/25 noted pre cancerous polyp, mild diverticula in the descending and sigmoid colon, small internal hemorrhoid      CT abdomen and pelvis on admission; Acute sigmoid diverticulitis without drainable fluid collection, decreased compared to prior study  Colonoscopy  recommended to rule out possibility of colon cancer mimicking diverticulitis    Common bile duct stent with pneumobilia and air within the gallbladder    Distended gallbladder with cholelithiasis   Cannot rule out acute cholecystitis      On exam patient denies nausea or vomiting  Patient does state he has generalized abdominal pain with palpation      - NPO, sips meds  - pantoprazole 40 mg daily  - IV antibiotics  - stool for C diff negative   - stool enteric pending     3  Cholelithiasis  CT abdomen and pelvis on admission; Common bile duct stent with pneumobilia and air within the gallbladder  Distended gallbladder with cholelithiasis   Cannot rule out acute cholecystitis  Liver enzymes remain normal   Lipase 691 on admission       - surgical consult, patient not a surgical candidate  If concern for cholecystitis would recommend a cholecystotomy tube      4  Anemia  Hemoglobin on admission 10 3, with a m  Labs today 7 0  Recent 08/07/2022 drop to 6 2  Colonoscopy 7/25/22, ERCP 8/29/22, no source of bleeding noted  Per patient's nurse, he is not having any overt GI bleeding      - monitor hemoglobin, transfuse as necessary     Discussed patient with Dr Karyn Wren on rounds for evaluation EGD if hemoglobin continues to decline  5  Ampullary adenoma   ERCP 8/20 09/2022, Dr Clay Gonzalez, [facility] esophagus seen during EGD, biopsy obtained    ERCP performed with endoscopic mucosal resection of abnormal looking ampullary tissue, PD, CBD stent placed      6  DORA (acute kidney injury)  Creatinine on admission 8 31  (08/10/2022, creatinine 2 96)  With a m  Labs today 8 06      7  Chronic atrial fibrillation  Patient currently is on Coumadin as outpatient  Coumadin on hold      8  Multiple myeloma  Patient has diagnosis of multiple myeloma not having achieved remission at this time, last bone marrow biopsy was positive  Chemotherapy- Velcade and Decadron  Followed by Oncology as outpatient  Last chemotherapy infusion was 09/06/2022     9  Diabetes mellitus    Chief Complaint   Patient presents with    Abnormal Lab     Patient arrives via EMS from [facility] for an elevated creatinine of 7 3  Reports yellow emesis for a few days  Patient has RUQ and RLQ abdominal pain, is currently getting chemo once a week for kidney cancer  SUBJECTIVE/HPI   Patient states he feels weak and tired  Patient denies nausea or vomiting  He does have increased abdominal discomfort generalized to his abdomen with palpation  Per patient's nurse he has not had any overt GI bleeding however hemoglobin continues to decline       /55   Pulse 61   Temp 97 9 °F (36 6 °C) (Oral)   Resp 17   Ht 6' 3" (1 905 m)   Wt 118 kg (260 lb 2 3 oz)   SpO2 99%   BMI 32 52 kg/m²     PHYSICALEXAM  General appearance: alert, appears stated age and cooperative  Eyes: PERLLA, EOMI, no icterus   Head: Normocephalic, without obvious abnormality, atraumatic  Lungs: clear to auscultation bilaterally  Heart: regular rate and rhythm, S1, S2 normal, no murmur, click, rub or gallop  Abdomen: soft, non-tender; bowel sounds normal; no masses,  no organomegaly  Extremities: extremities normal, atraumatic, no cyanosis or edema  Neurologic: Grossly normal    Lab Results   Component Value Date    GLUCOSE 64 (L) 08/01/2022    CALCIUM 7 7 (L) 09/14/2022     01/15/2018    K 3 2 (L) 09/14/2022    CO2 18 (L) 09/14/2022     09/14/2022     (H) 09/14/2022    CREATININE 8 06 (H) 09/14/2022     Lab Results   Component Value Date    WBC 6 41 09/14/2022    HGB 7 0 (L) 09/14/2022    HCT 21 0 (L) 09/14/2022    MCV 81 (L) 09/14/2022     (L) 09/14/2022     Lab Results   Component Value Date    ALT 14 09/14/2022    AST 14 09/14/2022     (H) 11/03/2019    ALKPHOS 92 09/14/2022    BILITOT 0 6 01/15/2018     No results found for: AMYLASE  Lab Results   Component Value Date    LIPASE 691 (H) 09/13/2022     Lab Results   Component Value Date    IRON 17 (L) 08/04/2022    TIBC 206 (L) 08/04/2022    FERRITIN 165 08/04/2022     Lab Results   Component Value Date    INR 2 61 (H) 09/14/2022

## 2022-09-14 NOTE — ASSESSMENT & PLAN NOTE
Lab Results   Component Value Date    EGFR 6 09/13/2022    EGFR 6 09/13/2022    EGFR 6 09/13/2022    CREATININE 8 17 (H) 09/13/2022    CREATININE 8 14 (H) 09/13/2022    CREATININE 8 20 (H) 09/13/2022     · Acute on chronic kidney disease creatinine is 8 31 continue to monitor  · Bicarb drip at 125 an hour also Lasix drip at 30 ml/hr and Zaroxolyn 10 q 6  · Q 6 hours labs

## 2022-09-14 NOTE — ASSESSMENT & PLAN NOTE
Lab Results   Component Value Date    HGBA1C 6 3 (H) 08/05/2022       Recent Labs     09/12/22  2325 09/13/22  0525 09/13/22  1134 09/13/22  1727   POCGLU 135 154* 165* 139       Blood Sugar Average: Last 72 hrs:  (P) 215 8 patient is NPO at this time  Continue insulin sliding scale   Continue  bedtime insulin 10 units  Hold Victoza  If needed start insulin drip

## 2022-09-14 NOTE — ASSESSMENT & PLAN NOTE
Patient is a 71-year-old male with an extensive medical history recently admitted from 08/1 to 8/10 for acute diverticulitis was on Rocephin and Flagyl, also had an ERCP and a common bile duct stent placed  Who comes in today again with nausea vomiting and diarrhea for several days     · CT of the abdomen and pelvis shows acute sigmoid diverticulitis without drainable fluid collection compared also with to studies on 08/01 it is decreased, also shows common bile duct stent with normal mood bili and air within the gallbladder, distended gallbladder with cholelithiasis concern for acute cholecystitis also thickened and under distended urinary bladder possible cystitis  · Started on Zosyn 3 375 g IV Q 6 hours  · GI and General surgery consult for further evaluation of cholecystitis  · Continue to monitor end points   · C diff check on diarrhea due to recent use of antibiotics  · Ultrasound right upper quadrant

## 2022-09-14 NOTE — ASSESSMENT & PLAN NOTE
· Patient has acute kidney injury with a creatinine of 8 1 and a metabolic acidosis most likely related to renal failure  · Nephrology is following   · Hyperkalemia was treated with dextrose bicarb calcium and insulin last result 4 8   · Last admission patient arrived with for creatinine of 5 97 which resolved to 2 96  · Q 6 hours labs  · Bicarb 125 ml / hour   · Started on with Zaroxolyn 10 mg q 6 and Lasix infusion started at 20 increased to 30 milligrams/hour  · Urine output ranges between 30-40 ml per hour  · May need hemodialysis - discussed with patient if he wants long-term dialysis

## 2022-09-14 NOTE — QUICK NOTE
9/14/2022 3:54 PM -  Jarad Rochaalex's chart and case were reviewed by Siri Spence PA-C  Mode of review included electronic chart check  Per review, patient's goals of care remain clear at this time  He currently does not require emergent dialysis  Symptoms overall stable  Palliative care will follow, plan to return on 9/15/22 unless otherwise indicated by clinical course  For urgent issues or any questions/concerns, please notify on-call provider via [address]  You may also call our answering service 24/7 at   Siri Spence PA-C  Palliative and Supportive Care  Clinic/Answering Service: [phone]  You can find me on [employer]!

## 2022-09-14 NOTE — ASSESSMENT & PLAN NOTE
Patient is on Coumadin his INR was 2 56  Will hold Coumadin for now, start on heparin in the a m   Until planned for surgery verses IR procedure  Patient is well controlled on last admission was on metoprolol and went into to 20 with his heart rate received atropine with good response  Patient was started on heparin drip

## 2022-09-14 NOTE — ASSESSMENT & PLAN NOTE
Lab Results   Component Value Date    EGFR 6 09/13/2022    EGFR 6 09/13/2022    EGFR 6 09/13/2022    CREATININE 8 17 (H) 09/13/2022    CREATININE 8 14 (H) 09/13/2022    CREATININE 8 20 (H) 09/13/2022     Patient had outpatient labs done which showed a creatinine of 7 0 on last admission creatinine was 5 76 which improved to around 2 96 on discharge  Creatinine now is 8 44 most likely related again due to dehydration and diarrhea  Nephrology is consulted  Discussion about dialysis may need to be made  Patient still makes urine  Due to severe metabolic acidosis started on bicarb drip at 105 an hour  Q 6 hours BMP Mag and phos

## 2022-09-14 NOTE — PROGRESS NOTES
NEPHROLOGY PROGRESS NOTE   Alex Wong 73 y o  male MRN: [id number]  Unit/Bed#: -01 Encounter: [id number]      HPI/24hr EVENTS:    -72-year-old male past medical history of multiple myeloma currently being treated with Velcade and Decadron, CKD 3 with recent DORA/non HD with creatinine of 2 9 at discharge, heart failure with reduced ejection fraction, hypertension, morbid obesity    Presented with abnormal outpatient lab work/DORA, reports multiple days of diarrhea and decreased oral intake, presentation similar to recent in August    -received IV fluid resuscitation with minimal urine output, given Lasix bolus and started on Lasix infusion, this morning in Lasix infusion was increased to 40 milligrams/hour, continues to receive metolazone 10 mg q 6 hours with mild improvement in urine output    ASSESSMENT/PLAN:    DORA (POA)  -Baseline creatinine: prior to 10/2021 was 1 2 - 1 4, had has recurrent episodes of non HD DORA, more recently baseline was 1 8-2 2, recent hospitalization in early August with DORA with a serum creatinine of 5 on presentation which improved to 2 96 at time of discharge 8/10  -Creatinine on admission 8 44, most recent creatinine 8 06 (GFR remains between 5 and 6)  -Etiology:  Suspect prerenal azotemia 2nd to severe diarrhea with reduced oral intake, sepsis due to diverticulitis, possible conversion to ATN  -UA:  [place], negative blood/protein  -Renal imaging:  CT abdomen pelvis without contrast on admission-nonobstructing left-sided punctate intrarenal calculi, left-sided, Renal cyst is seen no mention of hydronephrosis  -Avoid hypotension, avoid nephrotoxins, avoid NSAIDS  -Trend BMP  -received 500 mL 0 9% saline bolus x2 on admission, additional 500 mL isolate bolus earlier this morning, started on sodium bicarbonate infusion earlier this morning at 125 mL/hour  -strict I&Os, current urine output 610 mL/24 hours   -ongoing oliguria despite IV fluid resuscitation, was given Lasix bolus and started on Lasix infusion currently on 40 milligrams/hour with metolazone 10 mg q 6 hours  -lengthy discussion bedside regarding possible requirements of renal replacement therapy in the coming hours to day's depending on patient's response to current treatments  This included risks and benefits associated with dialysis including infection, arrhythmia, sudden death  Reviewed modalities of dialysis/renal replacement therapy pre and indications for renal replacement therapy with patient  Patient consented for dialysis, placed in chart   -no urgent indication for dialysis currently, will continue to monitor for renal replacement therapy needs, discussed with Dr Darryle Ishihara     CKD IIIb  -Etiology:  Suspected diabetic nephropathy, hypertensive nephrosclerosis, cardiorenal syndrome, possible underlying myeloma  -Follows with Dr Poncho Amin as OP, last seen 56/3924     Metabolic acidosis  -bicarb 10 admission with anion gap 20, chloride 109, lactic 1 2  -likely 2nd to DORA and GI loss of bicarbonate via diarrhea  -started on sodium bicarbonate infusion, most recent bicarb 18, anion gap 17  -continue sodium bicarbonate infusion, trend BMP, 1 bicarbonate levels normalize consider transitioning to Plasma-Lyte at same rate     Hypokalemia   -was initially hyperkalemic on admission at 5 4,   -most recently 3 2  -likely due to diuretics  -continue sodium bicarbonate infusion, trend BMP  -continue to trend BMP, received 40 mEq p o  potassium this morning     Hypocalcemia  -ionized calcium 1 01  -received 2 g calcium gluconate this morning    Anemia  -hemoglobin 10 3 on admission reduced 7 0 after IV fluid resuscitation  -in the setting of known multiple myeloma  -with DORA and thrombocytopenia consider further TMA workup  -recommend transfuse for goal greater than 7 per primary team     Heart failure with reduced ejection fraction  -10/21 TTE:  EF 45%, mild mitral valve regurgitation    -outpatient diuretic torsemide 40 mg b i d , currently on hold  -weight on admission via bed scale 115 kg,  most recent bed scale weight 118 kg,  -reported recent 20 lb weight loss as outpatient in the setting of poor oral intake and diarrhea  -daily weights monitor intake and output  -currently on room air     Multiple myeloma  -under recent bone marrow biopsy 9/8  -follows with Dr Reddy Chanel from Hematology-Oncology  -currently on Velcade and Decadron per Heme-Onc, last treatment 9/6     Hypertension  -only outpatient medication is torsemide  - recent blood pressure is acceptable    Ampulary adenoma  -s/p ERCP with endoscopic mucosal resection s/p PD and CBD stent placement by GI in 7/2022  -Ct scan with CBD stent and pneumobilia and air within gallbladdder  -Surgery consulted/GI consulted     Acute Diverticulitis   -CT AP with acute sigmoid diverticulitis without drainable collection  -On zosyn per primary team  -GI and surgery consulted  -Continue IVF resuscitation for now with sodium bicarbonate infusion  -Care per primary team     Hyperphosphatemia  -in the setting of acute renal failure, phosphorus 6 1, currently NPO  -trend daily, recommend renal diet, might require binders hopeful improvement with improvement in renal function     Hypomagnesemia  -magnesium 1 1 on admission most recently 1 9  -status post repletion with 2 g IV magnesium sulfate earlier this morning, recommend goal greater than 2  -etiology likely secondary to acute GI loss     Atrial fibrillation  -on Coumadin as outpatient, currently held in favor of heparin infusion     Gout  -on allopurinol 300 mg daily, consider reduction in dose given DORA    Advanced care planning  -reviewed palliative care meeting notes    Addition medical problems: Morbid obesity, dm 2 last A1c 6 3    Discussed with critical care AP    SUBJECTIVE:  Denies pain, reports generalized fatigue    ROS:  Review of Systems   Constitutional: Positive for fatigue  Respiratory: Negative      Cardiovascular: Negative  Gastrointestinal: Negative  Genitourinary: Negative  Musculoskeletal: Negative  Neurological: Negative  All other systems reviewed and are negative  OBJECTIVE:  Current Weight: Weight - Scale: 118 kg (260 lb 2 3 oz)  Vitals:    09/14/22 0500 09/14/22 0600 09/14/22 0800 09/14/22 1000   BP: 101/50  114/59 108/55   BP Location: Left leg  Left arm    Pulse: 60  59 61   Resp: 18  18 17   Temp: 97 6 °F (36 4 °C)  97 9 °F (36 6 °C)    TempSrc: Oral  Oral    SpO2: 100%  98% 99%   Weight: 118 kg (260 lb 9 3 oz) 118 kg (260 lb 2 3 oz)     Height:           Intake/Output Summary (Last 24 hours) at 9/14/2022 1038  Last data filed at 9/14/2022 1000  Gross per 24 hour   Intake 3859 22 ml   Output 667 ml   Net 3192 22 ml     Physical Exam  Vitals and nursing note reviewed  Constitutional:       General: He is not in acute distress  Appearance: Normal appearance  He is obese  He is not toxic-appearing or diaphoretic  Comments: Awake lying in bed   HENT:      Head: Normocephalic and atraumatic  Nose: Nose normal       Mouth/Throat:      Mouth: Mucous membranes are moist    Eyes:      General: No scleral icterus  Cardiovascular:      Rate and Rhythm: Normal rate and regular rhythm  Pulses: Normal pulses  Pulmonary:      Effort: Pulmonary effort is normal  No respiratory distress  Breath sounds: Normal breath sounds  No wheezing or rales  Comments: Poor inspiratory effort  Abdominal:      General: Abdomen is flat  There is no distension  Palpations: Abdomen is soft  Tenderness: There is no abdominal tenderness  Genitourinary:     Comments: [place] catheter with clear yellow urine  Musculoskeletal:      Cervical back: Neck supple  Comments: Left AKA  Trace right lower extremity edema   Skin:     General: Skin is warm and dry  Capillary Refill: Capillary refill takes less than 2 seconds  Neurological:      General: No focal deficit present  Mental Status: He is alert  GCS: GCS eye subscore is 4  GCS verbal subscore is 5  GCS motor subscore is 6        Comments: No tremor          Medications:    Current Facility-Administered Medications:     albuterol (PROVENTIL HFA,VENTOLIN HFA) inhaler 2 puff, 2 puff, Inhalation, Q6H PRN, Julia Parr PA-C    allopurinol (ZYLOPRIM) tablet 300 mg, 300 mg, Oral, Daily, Julia Parr PA-C, 300 mg at 09/14/22 0823    atorvastatin (LIPITOR) tablet 40 mg, 40 mg, Oral, After Dinner, Julia Parr PA-C, 40 mg at 09/13/22 1728    clotrimazole (LOTRIMIN) 1 % cream, , Topical, BID, Julia Parr PA-C, Given at 09/14/22 0824    fluticasone-vilanterol (BREO ELLIPTA) 200-25 MCG/INH inhaler 1 puff, 1 puff, Inhalation, Daily, Julia Parr PA-C, 1 puff at 09/14/22 0823    furosemide (LASIX) 500 mg infusion 50 mL, 40 mg/hr, Intravenous, Continuous, Arnaldo Perera PA-C, Last Rate: 4 mL/hr at 09/14/22 1010, 40 mg/hr at 09/14/22 1010    heparin (porcine) 25,000 units in 0 45% NaCl 250 mL infusion (premix), 3-20 Units/kg/hr (Order-Specific), Intravenous, Titrated, NICOLA Calle, Last Rate: 4 6 mL/hr at 09/14/22 0601, 5 1 Units/kg/hr at 09/14/22 0601    heparin (porcine) injection 2,000 Units, 2,000 Units, Intravenous, Q1H PRN, Rolm Gabby, CRNP    heparin (porcine) injection 4,000 Units, 4,000 Units, Intravenous, Q1H PRN, Rolm Gabby, CRNP    insulin glargine (LANTUS) subcutaneous injection 10 Units 0 1 mL, 10 Units, Subcutaneous, HS, Julia Parr PA-C, 10 Units at 09/13/22 2109    insulin lispro (HumaLOG) 100 units/mL subcutaneous injection 1-6 Units, 1-6 Units, Subcutaneous, Q6H [facility], 1 Units at 09/14/22 0643 **AND** Fingerstick Glucose (POCT), , , Q6H, Julia Parr PA-C    melatonin tablet 6 mg, 6 mg, Oral, HS, Julia Parr PA-C, 6 mg at 09/13/22 2109    metolazone (ZAROXOLYN) tablet 10 mg, 10 mg, Oral, Q6H, Alvenia NICOLA Parsons, 10 mg at 09/14/22 7997    pantoprazole (PROTONIX) EC tablet 40 mg, 40 mg, Oral, Early Morning, Julia Parr PA-C, 40 mg at 09/14/22 0642    piperacillin-tazobactam (ZOSYN) 3 375 g in sodium chloride 0 9 % 100 mL IVPB (EXTENDED-INFUSION), 3 375 g, Intravenous, Q12H, Julia Parr PA-C, Last Rate: 25 mL/hr at 09/14/22 0601, Rate Verify at 09/14/22 0601    sodium bicarbonate 150 mEq in dextrose 5 % 1,000 mL infusion, 125 mL/hr, Intravenous, Continuous, Julia Parr PA-C, Last Rate: 125 mL/hr at 09/14/22 0826, 125 mL/hr at 09/14/22 0826    Laboratory Results:  Results from last 7 days   Lab Units 09/14/22  0523 09/13/22  2322 09/13/22  1708 09/13/22  1156 09/13/22  0532 09/13/22  0107 09/12/22  2157 09/12/22  1958 09/12/22  1658   WBC Thousand/uL 6 41  --   --   --  7 02  --   --   --  10 20*   HEMOGLOBIN g/dL 7 0*  --   --   --  8 3*  --   --   --  10 3*   HEMATOCRIT % 21 0*  --   --   --  26 2*  --   --   --  32 6*   PLATELETS Thousands/uL 123*  --   --   --  124*  --   --   --  130*   POTASSIUM mmol/L 3 2* 3 7 4 0 4 5 4 9 4 8 5 4*   < > 5 4*   CHLORIDE mmol/L 105 106 107 109* 110* 111* 111*   < > 109*   CO2 mmol/L 18* 17* 17* 17* 12* 10* 12*   < > 10*   BUN mg/dL 103* 103* 98* 102* 101* 104* 104*   < > 103*   CREATININE mg/dL 8 06* 8 17* 8 14* 8 20* 8 21* 7 81* 8 32*   < > 8 44*   CALCIUM mg/dL 7 7* 7 6* 7 8* 7 9* 8 1* 7 9* 8 4   < > 8 5   MAGNESIUM mg/dL 1 9 1 5* 1 6 1 6 1 6  --  1 1*  --   --    PHOSPHORUS mg/dL 6 1* 5 6* 5 4* 5 4* 5 4*  --  5 4*  --   --     < > = values in this interval not displayed  I have personally reviewed the blood work as stated above and in my note  I have personally reviewed critical care note

## 2022-09-14 NOTE — PROGRESS NOTES
[place]  Progress Note - Mario Parr 1950, 73 y o  male MRN: [id number]  Unit/Bed#: -01 Encounter: [id number]  Primary Care Provider: Miguel Cervantes MD   Date and time admitted to hospital: 9/12/2022  4:09 PM    Acute diverticulitis  Assessment & Plan  Patient is a 82-year-old male with an extensive medical history recently admitted from 08/1 to 8/10 for acute diverticulitis was on Rocephin and Flagyl, also had an ERCP and a common bile duct stent placed  Who comes in today again with nausea vomiting and diarrhea for several days     · CT of the abdomen and pelvis shows acute sigmoid diverticulitis without drainable fluid collection compared also with to studies on 08/01 it is decreased, also shows common bile duct stent with normal mood bili and air within the gallbladder, distended gallbladder with cholelithiasis concern for acute cholecystitis also thickened and under distended urinary bladder possible cystitis  · Started on Zosyn 3 375 g IV Q 6 hours  · GI and General surgery consult for further evaluation of cholecystitis  · Continue to monitor end points   · C diff check on diarrhea due to recent use of antibiotics  · Ultrasound right upper quadrant      * DORA (acute kidney injury) ([facility] [address] )  Assessment & Plan  · Patient has acute kidney injury with a creatinine of 8 1 and a metabolic acidosis most likely related to renal failure  · Nephrology is following   · Hyperkalemia was treated with dextrose bicarb calcium and insulin last result 4 8   · Last admission patient arrived with for creatinine of 5 97 which resolved to 2 96  · Q 6 hours labs  · Bicarb 125 ml / hour   · Started on with Zaroxolyn 10 mg q 6 and Lasix infusion started at 20 increased to 30 milligrams/hour  · Urine output ranges between 30-40 ml per hour  · May need hemodialysis - discussed with patient if he wants long-term dialysis      Cholecystitis, acute  Assessment & Plan  Patient's CT scan showed possible acute cholecystitis with pneumobilia and air   Continue on Zosyn q 6  Monitor endpoint  Procalcitonin in the a m  Blood cultures obtained  GI and surgery -  If concern for acute cholecystitis with probably be IR procedure not a candidate for surgery   Ultrasound of right upper quadrant still pending  No Lentz sign no right upper quadrant tenderness stent already in place    Hyperkalemia-resolved as of 9/14/2022  Assessment & Plan  · Patient was treated with bicarb calcium insulin and dextrose for hyperkalemia  · BMP q 6 hours  · Nephrology is consult    Chronic combined systolic and diastolic congestive heart failure (HCC)  Assessment & Plan  Wt Readings from Last 3 Encounters:   09/13/22 115 kg (253 lb 8 5 oz)   09/06/22 117 kg (257 lb 15 oz)   08/30/22 118 kg (260 lb)     Patient has an EF of 40-45% on last admission was volume overloaded and received Lasix  Patient is on torsemide at baseline- on hold  Patient is on a Lasix drip and Zaroxolyn        Stage 3b chronic kidney disease ([facility] [address] )  Assessment & Plan  Lab Results   Component Value Date    EGFR 6 09/13/2022    EGFR 6 09/13/2022    EGFR 6 09/13/2022    CREATININE 8 17 (H) 09/13/2022    CREATININE 8 14 (H) 09/13/2022    CREATININE 8 20 (H) 09/13/2022     Patient had outpatient labs done which showed a creatinine of 7 0 on last admission creatinine was 5 76 which improved to around 2 96 on discharge  Creatinine now is 8 44 most likely related again due to dehydration and diarrhea  Nephrology is consulted  Discussion about dialysis may need to be made  Patient still makes urine  Due to severe metabolic acidosis started on bicarb drip at 105 an hour  Q 6 hours BMP Mag and phos    Chronic atrial fibrillation ([facility] [address] )  Assessment & Plan  Patient is on Coumadin his INR was 2 56  Will hold Coumadin for now, start on heparin in the a m   Until planned for surgery verses IR procedure  Patient is well controlled on last admission was on metoprolol and went into to 20 with his heart rate received atropine with good response  Patient was started on heparin drip    Morbid obesity ([facility] [address] )  Assessment & Plan  Encourage nutrition counseling lifestyle changes and weight loss    Hyperlipidemia  Assessment & Plan  Continue Lipitor    Multiple myeloma [facility])  Assessment & Plan  Patient has a diagnosis of multiple myeloma not having achieved remission at this time last bone marrow biopsy was positive patient's chemotherapy - Velcade and Decadron  · Is seen by outpatient Oncology 8/19/2022  · Last chemotherapy infusion was 9/6/2022    Ambulatory dysfunction  Assessment & Plan  History of the left AKA    Chronic obstructive pulmonary disease, unspecified ([facility] [address] )  Assessment & Plan  Patient has chronic COPD is on 2 L of oxygen as needed in the nursing  Does not appear to have acute exacerbation  Continue Advair and albuterol    GERD (gastroesophageal reflux disease)  Assessment & Plan  Continue Protonix    NALLELY (obstructive sleep apnea)  Assessment & Plan  History of obstructive sleep apnea noncompliance with CPAP    Increased anion gap metabolic acidosis  Assessment & Plan  · Bicarb is at 10 on the BMP and a anion gap is at 20  · Most likely related due to renal failure  · Continue to monitor closely    CKD (chronic kidney disease)  Assessment & Plan  Lab Results   Component Value Date    EGFR 6 09/13/2022    EGFR 6 09/13/2022    EGFR 6 09/13/2022    CREATININE 8 17 (H) 09/13/2022    CREATININE 8 14 (H) 09/13/2022    CREATININE 8 20 (H) 09/13/2022     · Acute on chronic kidney disease creatinine is 8 31 continue to monitor  · Bicarb drip at 125 an hour also Lasix drip at 30 ml/hr and Zaroxolyn 10 q 6  · Q 6 hours labs    Diabetes mellitus, type 2 [facility])  Assessment & Plan  Lab Results   Component Value Date    HGBA1C 6 3 (H) 08/05/2022       Recent Labs     09/12/22  2325 09/13/22  0525 09/13/22  1134 09/13/22  1727   POCGLU 135 154* 165* 139       Blood Sugar Average: Last 72 hrs:  (P) 215 8 patient is NPO at this time  Continue insulin sliding scale   Continue  bedtime insulin 10 units  Hold Victoza  If needed start insulin drip      ----------------------------------------------------------------------------------------  HPI/24hr events:   Patient is a 62-year-old male with an extensive medical history who comes in after several days of nausea vomiting and diarrhea significant DORA with a creatinine of 8 3    · Palliative care head patient and family discussion remains a level 1 and also would like dialysis but not long-term- needs to be clarified at when he started on dialysis now most likely he will be on it for the rest of his life  · Started on a Lasix drip at 30 ml/hour, Zaroxolyn 10 mg q 6      Patient appropriate for transfer out of the ICU today?: No  Disposition: Continue Stepdown Level 1 level of care   Code Status: Level 1 - Full Code  ---------------------------------------------------------------------------------------  SUBJECTIVE  Pt feels ok - has abd discomfort     Review of Systems   Constitutional: Positive for appetite change and fatigue  Eyes: Negative  Respiratory: Negative  Gastrointestinal: Positive for abdominal pain, diarrhea, nausea and vomiting  Endocrine: Negative  Genitourinary: Negative  Musculoskeletal: Negative  Allergic/Immunologic: Negative  Neurological: Negative  Hematological: Negative        Review of systems was reviewed and negative unless stated above in HPI/24-hour events   ---------------------------------------------------------------------------------------  OBJECTIVE    Vitals   Vitals:    22 1129 22 1533 22 0000   BP: 114/66  112/56 93/52   BP Location: Left arm      Pulse: 60  61 63   Resp: 18   18   Temp: 97 6 °F (36 4 °C) 97 9 °F (36 6 °C) 98 °F (36 7 °C) 98 °F (36 7 °C)   TempSrc: Oral Oral Oral Oral   SpO2: 100%   99%   Weight:       Height:         Temp (24hrs), Av 8 °F (36 6 °C), Min:97 3 °F (36 3 °C), Max:98 °F (36 7 °C)  Current: Temperature: 98 °F (36 7 °C)          Respiratory:  SpO2: SpO2: 99 %, SpO2 Activity: SpO2 Activity: At Rest, SpO2 Device: O2 Device: None (Room air)       Invasive/non-invasive ventilation settings   Respiratory  Report   Lab Data (Last 4 hours)    None         O2/Vent Data (Last 4 hours)    None                Physical Exam  Vitals and nursing note reviewed  Constitutional:       Appearance: He is well-developed  He is obese  He is ill-appearing  HENT:      Head: Normocephalic and atraumatic  Eyes:      Pupils: Pupils are equal, round, and reactive to light  Cardiovascular:      Rate and Rhythm: Regular rhythm  Bradycardia present  Pulses: Normal pulses  Heart sounds: Normal heart sounds  Pulmonary:      Effort: Pulmonary effort is normal       Breath sounds: Normal breath sounds  Abdominal:      General: Abdomen is protuberant  Bowel sounds are normal  There is distension  Palpations: Abdomen is soft  Tenderness: There is abdominal tenderness  There is no guarding  Genitourinary:     Penis: Normal     Musculoskeletal:         General: Normal range of motion  Cervical back: Normal range of motion and neck supple  Right lower leg: Edema present  Comments: Left BKA   Skin:     General: Skin is warm and dry  Capillary Refill: Capillary refill takes less than 2 seconds  Coloration: Skin is pale  Neurological:      Mental Status: He is alert and oriented to person, place, and time               Laboratory and Diagnostics:  Results from last 7 days   Lab Units 09/13/22  0532 09/12/22  1658   WBC Thousand/uL 7 02 10 20*   HEMOGLOBIN g/dL 8 3* 10 3*   HEMATOCRIT % 26 2* 32 6*   PLATELETS Thousands/uL 124* 130*   NEUTROS PCT % 73 78*   MONOS PCT % 9 7     Results from last 7 days   Lab Units 09/13/22  2322 09/13/22  1708 09/13/22  1156 09/13/22  0532 09/13/22  0107 09/12/22  2157 09/12/22  1958 09/12/22  1658 SODIUM mmol/L 139 140 140 141 141 141 139 139   POTASSIUM mmol/L 3 7 4 0 4 5 4 9 4 8 5 4* 5 0 5 4*   CHLORIDE mmol/L 106 107 109* 110* 111* 111* 111* 109*   CO2 mmol/L 17* 17* 17* 12* 10* 12* 9* 10*   ANION GAP mmol/L 16* 16* 14* 19* 20* 18* 19* 20*   BUN mg/dL 103* 98* 102* 101* 104* 104* 104* 103*   CREATININE mg/dL 8 17* 8 14* 8 20* 8 21* 7 81* 8 32* 8 31* 8 44*   CALCIUM mg/dL 7 6* 7 8* 7 9* 8 1* 7 9* 8 4 8 7 8 5   GLUCOSE RANDOM mg/dL 139 127 133 150* 129 98 122 91   ALT U/L  --   --   --  16  --   --   --  17   AST U/L  --   --   --  13  --   --   --  17   ALK PHOS U/L  --   --   --  106  --   --   --  135*   ALBUMIN g/dL  --   --   --  2 6*  --   --   --  3 3*   TOTAL BILIRUBIN mg/dL  --   --   --  0 60  --   --   --  0 40     Results from last 7 days   Lab Units 09/13/22 2322 09/13/22  1708 09/13/22  1156 09/13/22  0532 09/12/22  2157   MAGNESIUM mg/dL 1 5* 1 6 1 6 1 6 1 1*   PHOSPHORUS mg/dL 5 6* 5 4* 5 4* 5 4* 5 4*      Results from last 7 days   Lab Units 09/13/22 2322 09/13/22  1714 09/13/22  1535 09/13/22  0808 09/13/22  0532 09/12/22  1658   INR   --   --   --  2 86* 2 93* 2 62*   PTT seconds 112* 140* >210* 57*  --  47*          Results from last 7 days   Lab Units 09/12/22  1658   LACTIC ACID mmol/L 1 2     ABG:    VBG:    Results from last 7 days   Lab Units 09/13/22  0532 09/12/22  1658   PROCALCITONIN ng/ml 0 62* 0 60*       Micro  Results from last 7 days   Lab Units 09/12/22  2157 09/12/22  1658   BLOOD CULTURE   --  No Growth at 24 hrs  No Growth at 24 hrs  LEGIONELLA URINARY ANTIGEN  Negative  --    STREP PNEUMONIAE ANTIGEN, URINE  Negative  --        EKG: a fib  Imaging: I have personally reviewed pertinent reports  Intake and Output  I/O       09/12 0701 09/13 0700 09/13 0701 09/14 0700    P  O  120 180    I V  (mL/kg) 1055 4 (9 2) 3024 3 (26 3)    IV Piggyback 1300 150    Total Intake(mL/kg) 2475 4 (21 5) 3354 3 (29 2)    Urine (mL/kg/hr) 185 300 (0 1)    Stool  0    Total Output 185 300    Net +2290 4 +3054 3          Unmeasured Stool Occurrence  2 x          Height and Weights   Height: 6' 3" (190 5 cm)  IBW (Ideal Body Weight): 84 5 kg  Body mass index is 31 69 kg/m²  Weight (last 2 days)     Date/Time Weight    09/13/22 0532 115 (253 53)    09/12/22 2052 115 (253 53)    09/12/22 1615 109 (240)            Nutrition       Diet Orders   (From admission, onward)             Start     Ordered    09/12/22 2045  Diet NPO; Sips with meds  Diet effective now        References:    Nutrtion Support Algorithm Enteral vs  Parenteral   Question Answer Comment   Diet Type NPO    NPO Except: Sips with meds    RD to adjust diet per protocol?  No        09/12/22 2051                  Active Medications  Scheduled Meds:  Current Facility-Administered Medications   Medication Dose Route Frequency Provider Last Rate    albuterol  2 puff Inhalation Q6H PRN Heidemartawanna I JOEY Parr      allopurinol  300 mg Oral Daily Heidemartawanna Parr PA-C      atorvastatin  40 mg Oral After Torrie Nunez PA-C      clotrimazole   Topical BID Heidemartawanna Parr PA-C      fluticasone-vilanterol  1 puff Inhalation Daily Julia Parr PA-C      furosemide  30 mg/hr Intravenous Continuous Neoidemartawanna aPrr PA-C 30 mg/hr (09/14/22 0401)    heparin (porcine)  3-20 Units/kg/hr (Order-Specific) Intravenous Titrated Kimberly [place] FelicianaAZULNP 5 1 Units/kg/hr (09/14/22 0401)    heparin (porcine)  2,000 Units Intravenous Q1H PRN Kimberly [place], CRNP      heparin (porcine)  4,000 Units Intravenous Q1H PRN Kimberly Gillian, CRNP      insulin glargine  10 Units Subcutaneous HS Julia Parr PA-C      insulin lispro  1-6 Units Subcutaneous Q6H [address] Heidemarie COLE Parr PA-C      melatonin  6 mg Oral HS Heidemarie I JOEY Parr      metolazone  10 mg Oral Q6H NICOLA Calle      pantoprazole  40 mg Oral Early Morning Heideyaron Parr, PA-C  piperacillin-tazobactam  3 375 g Intravenous Q12H Julia Parr PA-C 25 mL/hr at 09/14/22 0401    sodium bicarbonate infusion  125 mL/hr Intravenous Continuous Julia Parr PA-C 125 mL/hr (09/14/22 0401)     Continuous Infusions:  furosemide, 30 mg/hr, Last Rate: 30 mg/hr (09/14/22 0401)  heparin (porcine), 3-20 Units/kg/hr (Order-Specific), Last Rate: 5 1 Units/kg/hr (09/14/22 0401)  sodium bicarbonate infusion, 125 mL/hr, Last Rate: 125 mL/hr (09/14/22 0401)      PRN Meds:   albuterol, 2 puff, Q6H PRN  heparin (porcine), 2,000 Units, Q1H PRN  heparin (porcine), 4,000 Units, Q1H PRN        Invasive Devices Review  Invasive Devices  Report    Peripheral Intravenous Line  Duration           Peripheral IV 09/12/22 Dorsal (posterior); Right Forearm 1 day    Peripheral IV 09/12/22 Right Antecubital 1 day    Peripheral IV 09/13/22 Distal;Left;Upper;Ventral (anterior) Arm <1 day          Drain  Duration           Urethral Catheter 14 Fr  1 day                Rationale for remaining devices:   ---------------------------------------------------------------------------------------  Advance Directive and Living Will:      Power of :    POLST:    ---------------------------------------------------------------------------------------  Care Time Delivered:   Upon my evaluation, this patient had a high probability of imminent or life-threatening deterioration due to Acute renal failure most likely will need dialysis, which required my direct attention, intervention, and personal management  I have personally provided 45 minutes (0300 to 0345) of critical care time, exclusive of procedures, teaching, family meetings, and any prior time recorded by providers other than myself  Julia Parr PA-C      Portions of the record may have been created with voice recognition software    Occasional wrong word or "sound a like" substitutions may have occurred due to the inherent limitations of voice recognition software    Read the chart carefully and recognize, using context, where substitutions have occurred

## 2022-09-15 ENCOUNTER — APPOINTMENT (INPATIENT)
Dept: INTERVENTIONAL RADIOLOGY/VASCULAR | Facility: HOSPITAL | Age: 72
DRG: 674 | End: 2022-09-15
Attending: RADIOLOGY
Payer: COMMERCIAL

## 2022-09-15 PROBLEM — E87.6 HYPOKALEMIA DUE TO EXCESSIVE GASTROINTESTINAL LOSS OF POTASSIUM: Status: ACTIVE | Noted: 2022-09-15

## 2022-09-15 LAB
ANION GAP SERPL CALCULATED.3IONS-SCNC: 14 MMOL/L (ref 4–13)
ANION GAP SERPL CALCULATED.3IONS-SCNC: 15 MMOL/L (ref 4–13)
ANION GAP SERPL CALCULATED.3IONS-SCNC: 17 MMOL/L (ref 4–13)
ANION GAP SERPL CALCULATED.3IONS-SCNC: 17 MMOL/L (ref 4–13)
APTT PPP: 66 SECONDS (ref 23–37)
BUN SERPL-MCNC: 100 MG/DL (ref 5–25)
BUN SERPL-MCNC: 95 MG/DL (ref 5–25)
BUN SERPL-MCNC: 96 MG/DL (ref 5–25)
BUN SERPL-MCNC: 96 MG/DL (ref 5–25)
BUN SERPL-MCNC: 97 MG/DL (ref 5–25)
BUN SERPL-MCNC: 97 MG/DL (ref 5–25)
CALCIUM SERPL-MCNC: 7.5 MG/DL (ref 8.3–10.1)
CALCIUM SERPL-MCNC: 7.8 MG/DL (ref 8.3–10.1)
CALCIUM SERPL-MCNC: 7.9 MG/DL (ref 8.3–10.1)
CALCIUM SERPL-MCNC: 8.1 MG/DL (ref 8.3–10.1)
CALCIUM SERPL-MCNC: 8.3 MG/DL (ref 8.3–10.1)
CALCIUM SERPL-MCNC: 8.3 MG/DL (ref 8.3–10.1)
CAMPYLOBACTER DNA SPEC NAA+PROBE: NORMAL
CHLORIDE SERPL-SCNC: 100 MMOL/L (ref 96–108)
CHLORIDE SERPL-SCNC: 101 MMOL/L (ref 96–108)
CHLORIDE SERPL-SCNC: 104 MMOL/L (ref 96–108)
CHLORIDE SERPL-SCNC: 99 MMOL/L (ref 96–108)
CO2 SERPL-SCNC: 21 MMOL/L (ref 21–32)
CO2 SERPL-SCNC: 21 MMOL/L (ref 21–32)
CO2 SERPL-SCNC: 22 MMOL/L (ref 21–32)
CO2 SERPL-SCNC: 23 MMOL/L (ref 21–32)
CO2 SERPL-SCNC: 24 MMOL/L (ref 21–32)
CO2 SERPL-SCNC: 24 MMOL/L (ref 21–32)
CREAT SERPL-MCNC: 7.79 MG/DL (ref 0.6–1.3)
CREAT SERPL-MCNC: 8.44 MG/DL (ref 0.6–1.3)
CREAT SERPL-MCNC: 8.45 MG/DL (ref 0.6–1.3)
CREAT SERPL-MCNC: 8.71 MG/DL (ref 0.6–1.3)
CREAT SERPL-MCNC: 8.78 MG/DL (ref 0.6–1.3)
CREAT SERPL-MCNC: 8.92 MG/DL (ref 0.6–1.3)
ERYTHROCYTE [DISTWIDTH] IN BLOOD BY AUTOMATED COUNT: 18.8 % (ref 11.6–15.1)
GFR SERPL CREATININE-BSD FRML MDRD: 5 ML/MIN/1.73SQ M
GFR SERPL CREATININE-BSD FRML MDRD: 6 ML/MIN/1.73SQ M
GLUCOSE SERPL-MCNC: 109 MG/DL (ref 65–140)
GLUCOSE SERPL-MCNC: 122 MG/DL (ref 65–140)
GLUCOSE SERPL-MCNC: 131 MG/DL (ref 65–140)
GLUCOSE SERPL-MCNC: 134 MG/DL (ref 65–140)
GLUCOSE SERPL-MCNC: 136 MG/DL (ref 65–140)
GLUCOSE SERPL-MCNC: 144 MG/DL (ref 65–140)
GLUCOSE SERPL-MCNC: 145 MG/DL (ref 65–140)
GLUCOSE SERPL-MCNC: 148 MG/DL (ref 65–140)
GLUCOSE SERPL-MCNC: 152 MG/DL (ref 65–140)
GLUCOSE SERPL-MCNC: 200 MG/DL (ref 65–140)
GLUCOSE SERPL-MCNC: 96 MG/DL (ref 65–140)
HCT VFR BLD AUTO: 20.9 % (ref 36.5–49.3)
HGB BLD-MCNC: 6.8 G/DL (ref 12–17)
HGB BLD-MCNC: 7.1 G/DL (ref 12–17)
MAGNESIUM SERPL-MCNC: 2 MG/DL (ref 1.6–2.6)
MAGNESIUM SERPL-MCNC: 2 MG/DL (ref 1.6–2.6)
MAGNESIUM SERPL-MCNC: 2.1 MG/DL (ref 1.6–2.6)
MAGNESIUM SERPL-MCNC: 2.2 MG/DL (ref 1.6–2.6)
MAGNESIUM SERPL-MCNC: 2.2 MG/DL (ref 1.6–2.6)
MAGNESIUM SERPL-MCNC: 2.5 MG/DL (ref 1.6–2.6)
MCH RBC QN AUTO: 26.8 PG (ref 26.8–34.3)
MCHC RBC AUTO-ENTMCNC: 33 G/DL (ref 31.4–37.4)
MCV RBC AUTO: 81 FL (ref 82–98)
PLATELET # BLD AUTO: 141 THOUSANDS/UL (ref 149–390)
PMV BLD AUTO: 11.8 FL (ref 8.9–12.7)
POTASSIUM SERPL-SCNC: 3.1 MMOL/L (ref 3.5–5.3)
POTASSIUM SERPL-SCNC: 3.2 MMOL/L (ref 3.5–5.3)
POTASSIUM SERPL-SCNC: 3.3 MMOL/L (ref 3.5–5.3)
POTASSIUM SERPL-SCNC: 3.3 MMOL/L (ref 3.5–5.3)
POTASSIUM SERPL-SCNC: 3.4 MMOL/L (ref 3.5–5.3)
POTASSIUM SERPL-SCNC: 3.6 MMOL/L (ref 3.5–5.3)
RBC # BLD AUTO: 2.57 MILLION/UL (ref 3.88–5.62)
SALMONELLA DNA SPEC QL NAA+PROBE: NORMAL
SHIGA TOXIN STX GENE SPEC NAA+PROBE: NORMAL
SHIGELLA DNA SPEC QL NAA+PROBE: NORMAL
SODIUM SERPL-SCNC: 137 MMOL/L (ref 135–147)
SODIUM SERPL-SCNC: 138 MMOL/L (ref 135–147)
SODIUM SERPL-SCNC: 139 MMOL/L (ref 135–147)
SODIUM SERPL-SCNC: 140 MMOL/L (ref 135–147)
WBC # BLD AUTO: 5.77 THOUSAND/UL (ref 4.31–10.16)

## 2022-09-15 PROCEDURE — 36556 INSERT NON-TUNNEL CV CATH: CPT

## 2022-09-15 PROCEDURE — 88342 IMHCHEM/IMCYTCHM 1ST ANTB: CPT | Performed by: PATHOLOGY

## 2022-09-15 PROCEDURE — 88364 INSITU HYBRIDIZATION (FISH): CPT | Performed by: PATHOLOGY

## 2022-09-15 PROCEDURE — NC001 PR NO CHARGE: Performed by: RADIOLOGY

## 2022-09-15 PROCEDURE — 99291 CRITICAL CARE FIRST HOUR: CPT | Performed by: PHYSICIAN ASSISTANT

## 2022-09-15 PROCEDURE — 88305 TISSUE EXAM BY PATHOLOGIST: CPT | Performed by: PATHOLOGY

## 2022-09-15 PROCEDURE — 83735 ASSAY OF MAGNESIUM: CPT | Performed by: PHYSICIAN ASSISTANT

## 2022-09-15 PROCEDURE — C1894 INTRO/SHEATH, NON-LASER: HCPCS

## 2022-09-15 PROCEDURE — 88313 SPECIAL STAINS GROUP 2: CPT | Performed by: PATHOLOGY

## 2022-09-15 PROCEDURE — 88365 INSITU HYBRIDIZATION (FISH): CPT | Performed by: PATHOLOGY

## 2022-09-15 PROCEDURE — 85730 THROMBOPLASTIN TIME PARTIAL: CPT | Performed by: PHYSICIAN ASSISTANT

## 2022-09-15 PROCEDURE — 99232 SBSQ HOSP IP/OBS MODERATE 35: CPT | Performed by: INTERNAL MEDICINE

## 2022-09-15 PROCEDURE — 85018 HEMOGLOBIN: CPT | Performed by: PHYSICIAN ASSISTANT

## 2022-09-15 PROCEDURE — 82948 REAGENT STRIP/BLOOD GLUCOSE: CPT

## 2022-09-15 PROCEDURE — 76937 US GUIDE VASCULAR ACCESS: CPT

## 2022-09-15 PROCEDURE — 02HV33Z INSERTION OF INFUSION DEVICE INTO SUPERIOR VENA CAVA, PERCUTANEOUS APPROACH: ICD-10-PCS | Performed by: RADIOLOGY

## 2022-09-15 PROCEDURE — 87081 CULTURE SCREEN ONLY: CPT | Performed by: INTERNAL MEDICINE

## 2022-09-15 PROCEDURE — 80048 BASIC METABOLIC PNL TOTAL CA: CPT | Performed by: PHYSICIAN ASSISTANT

## 2022-09-15 PROCEDURE — 77001 FLUOROGUIDE FOR VEIN DEVICE: CPT

## 2022-09-15 PROCEDURE — 77001 FLUOROGUIDE FOR VEIN DEVICE: CPT | Performed by: RADIOLOGY

## 2022-09-15 PROCEDURE — 99233 SBSQ HOSP IP/OBS HIGH 50: CPT | Performed by: INTERNAL MEDICINE

## 2022-09-15 PROCEDURE — 36556 INSERT NON-TUNNEL CV CATH: CPT | Performed by: RADIOLOGY

## 2022-09-15 PROCEDURE — 88311 DECALCIFY TISSUE: CPT | Performed by: PATHOLOGY

## 2022-09-15 PROCEDURE — 85097 BONE MARROW INTERPRETATION: CPT | Performed by: PATHOLOGY

## 2022-09-15 PROCEDURE — C1752 CATH,HEMODIALYSIS,SHORT-TERM: HCPCS

## 2022-09-15 PROCEDURE — 85027 COMPLETE CBC AUTOMATED: CPT | Performed by: PHYSICIAN ASSISTANT

## 2022-09-15 PROCEDURE — 88341 IMHCHEM/IMCYTCHM EA ADD ANTB: CPT | Performed by: PATHOLOGY

## 2022-09-15 PROCEDURE — 88239 TISSUE CULTURE TUMOR: CPT

## 2022-09-15 PROCEDURE — 76937 US GUIDE VASCULAR ACCESS: CPT | Performed by: RADIOLOGY

## 2022-09-15 RX ORDER — POTASSIUM CHLORIDE 20 MEQ/1
20 TABLET, EXTENDED RELEASE ORAL ONCE
Status: COMPLETED | OUTPATIENT
Start: 2022-09-15 | End: 2022-09-15

## 2022-09-15 RX ORDER — POTASSIUM CHLORIDE 20 MEQ/1
40 TABLET, EXTENDED RELEASE ORAL ONCE
Status: COMPLETED | OUTPATIENT
Start: 2022-09-15 | End: 2022-09-15

## 2022-09-15 RX ORDER — POTASSIUM CHLORIDE 20 MEQ/1
20 TABLET, EXTENDED RELEASE ORAL ONCE
Status: DISCONTINUED | OUTPATIENT
Start: 2022-09-15 | End: 2022-09-15

## 2022-09-15 RX ORDER — ALBUMIN, HUMAN INJ 5% 5 %
12.5 SOLUTION INTRAVENOUS ONCE
Status: COMPLETED | OUTPATIENT
Start: 2022-09-15 | End: 2022-09-15

## 2022-09-15 RX ORDER — POTASSIUM CHLORIDE 14.9 MG/ML
20 INJECTION INTRAVENOUS ONCE
Status: COMPLETED | OUTPATIENT
Start: 2022-09-15 | End: 2022-09-15

## 2022-09-15 RX ORDER — MIDODRINE HYDROCHLORIDE 5 MG/1
2.5 TABLET ORAL
Status: DISCONTINUED | OUTPATIENT
Start: 2022-09-15 | End: 2022-09-16

## 2022-09-15 RX ORDER — ACETAMINOPHEN 325 MG/1
650 TABLET ORAL EVERY 6 HOURS PRN
Status: DISCONTINUED | OUTPATIENT
Start: 2022-09-15 | End: 2022-10-04 | Stop reason: HOSPADM

## 2022-09-15 RX ORDER — SODIUM CHLORIDE, SODIUM GLUCONATE, SODIUM ACETATE, POTASSIUM CHLORIDE, MAGNESIUM CHLORIDE, SODIUM PHOSPHATE, DIBASIC, AND POTASSIUM PHOSPHATE .53; .5; .37; .037; .03; .012; .00082 G/100ML; G/100ML; G/100ML; G/100ML; G/100ML; G/100ML; G/100ML
75 INJECTION, SOLUTION INTRAVENOUS CONTINUOUS
Status: DISCONTINUED | OUTPATIENT
Start: 2022-09-15 | End: 2022-09-15

## 2022-09-15 RX ORDER — LIDOCAINE HYDROCHLORIDE 10 MG/ML
INJECTION, SOLUTION EPIDURAL; INFILTRATION; INTRACAUDAL; PERINEURAL CODE/TRAUMA/SEDATION MEDICATION
Status: COMPLETED | OUTPATIENT
Start: 2022-09-15 | End: 2022-09-15

## 2022-09-15 RX ORDER — POTASSIUM CHLORIDE 14.9 MG/ML
20 INJECTION INTRAVENOUS
Status: COMPLETED | OUTPATIENT
Start: 2022-09-15 | End: 2022-09-16

## 2022-09-15 RX ADMIN — POTASSIUM CHLORIDE 20 MEQ: 14.9 INJECTION, SOLUTION INTRAVENOUS at 01:17

## 2022-09-15 RX ADMIN — ALBUMIN (HUMAN) 12.5 G: 12.5 INJECTION, SOLUTION INTRAVENOUS at 06:46

## 2022-09-15 RX ADMIN — CLOTRIMAZOLE: 0.01 CREAM TOPICAL at 08:34

## 2022-09-15 RX ADMIN — ATORVASTATIN CALCIUM 40 MG: 40 TABLET, FILM COATED ORAL at 18:17

## 2022-09-15 RX ADMIN — POTASSIUM CHLORIDE 20 MEQ: 14.9 INJECTION, SOLUTION INTRAVENOUS at 03:22

## 2022-09-15 RX ADMIN — POTASSIUM CHLORIDE 40 MEQ: 20 TABLET, EXTENDED RELEASE ORAL at 18:17

## 2022-09-15 RX ADMIN — Medication 40 MG/HR: at 16:50

## 2022-09-15 RX ADMIN — POTASSIUM CHLORIDE 20 MEQ: 14.9 INJECTION, SOLUTION INTRAVENOUS at 18:23

## 2022-09-15 RX ADMIN — INSULIN GLARGINE 10 UNITS: 100 INJECTION, SOLUTION SUBCUTANEOUS at 22:27

## 2022-09-15 RX ADMIN — Medication 6 MG: at 22:27

## 2022-09-15 RX ADMIN — MIDODRINE HYDROCHLORIDE 2.5 MG: 5 TABLET ORAL at 18:24

## 2022-09-15 RX ADMIN — METOLAZONE 10 MG: 2.5 TABLET ORAL at 12:41

## 2022-09-15 RX ADMIN — METOLAZONE 10 MG: 2.5 TABLET ORAL at 05:23

## 2022-09-15 RX ADMIN — PIPERACILLIN AND TAZOBACTAM 3.38 G: 3; .375 INJECTION, POWDER, LYOPHILIZED, FOR SOLUTION INTRAVENOUS at 15:30

## 2022-09-15 RX ADMIN — SODIUM CHLORIDE, SODIUM GLUCONATE, SODIUM ACETATE, POTASSIUM CHLORIDE, MAGNESIUM CHLORIDE, SODIUM PHOSPHATE, DIBASIC, AND POTASSIUM PHOSPHATE 100 ML/HR: .53; .5; .37; .037; .03; .012; .00082 INJECTION, SOLUTION INTRAVENOUS at 09:00

## 2022-09-15 RX ADMIN — ALLOPURINOL 300 MG: 300 TABLET ORAL at 08:35

## 2022-09-15 RX ADMIN — CLOTRIMAZOLE: 0.01 CREAM TOPICAL at 20:07

## 2022-09-15 RX ADMIN — POTASSIUM CHLORIDE 20 MEQ: 20 TABLET, EXTENDED RELEASE ORAL at 01:17

## 2022-09-15 RX ADMIN — POTASSIUM CHLORIDE 20 MEQ: 14.9 INJECTION, SOLUTION INTRAVENOUS at 08:45

## 2022-09-15 RX ADMIN — METOLAZONE 10 MG: 2.5 TABLET ORAL at 18:17

## 2022-09-15 RX ADMIN — SODIUM BICARBONATE 125 ML/HR: 84 INJECTION, SOLUTION INTRAVENOUS at 03:23

## 2022-09-15 RX ADMIN — FLUTICASONE FUROATE AND VILANTEROL TRIFENATATE 1 PUFF: 200; 25 POWDER RESPIRATORY (INHALATION) at 08:52

## 2022-09-15 RX ADMIN — MIDODRINE HYDROCHLORIDE 2.5 MG: 5 TABLET ORAL at 06:46

## 2022-09-15 RX ADMIN — MIDODRINE HYDROCHLORIDE 2.5 MG: 5 TABLET ORAL at 12:41

## 2022-09-15 RX ADMIN — PIPERACILLIN AND TAZOBACTAM 3.38 G: 3; .375 INJECTION, POWDER, LYOPHILIZED, FOR SOLUTION INTRAVENOUS at 01:17

## 2022-09-15 RX ADMIN — POTASSIUM CHLORIDE 20 MEQ: 14.9 INJECTION, SOLUTION INTRAVENOUS at 18:17

## 2022-09-15 RX ADMIN — PANTOPRAZOLE SODIUM 40 MG: 40 TABLET, DELAYED RELEASE ORAL at 05:23

## 2022-09-15 RX ADMIN — INSULIN LISPRO 1 UNITS: 100 INJECTION, SOLUTION INTRAVENOUS; SUBCUTANEOUS at 12:43

## 2022-09-15 RX ADMIN — POTASSIUM CHLORIDE 20 MEQ: 20 TABLET, EXTENDED RELEASE ORAL at 09:31

## 2022-09-15 RX ADMIN — LIDOCAINE HYDROCHLORIDE 8 ML: 10 INJECTION, SOLUTION EPIDURAL; INFILTRATION; INTRACAUDAL; PERINEURAL at 15:23

## 2022-09-15 NOTE — ASSESSMENT & PLAN NOTE
· Repeat potassium, received 80 mEq so far  · Keep potassium greater than 4 2  · Most likely related due to loose stools x7

## 2022-09-15 NOTE — QUICK NOTE
9/15/2022 2:26 PM -  Collins Cummins's chart and case were reviewed by Peg1 CASSANDRA Orozco PA-C  Mode of review included electronic chart check  Collins Aicha going for [employer] placement in preparation for HD  His goals of care remain clear at this time  Palliative care will return on 9/16/22  For urgent issues or any questions/concerns, please notify on-call provider via [address] [place] [address]  You may also call our answering service 24/7 at   Ervin Orozco PA-C  Palliative and Supportive Care  Clinic/Answering Service: [phone]  You can find me on [employer]!

## 2022-09-15 NOTE — PROGRESS NOTES
[place]  Progress Note - Delisa Day 1950, 73 y o  male MRN: [id number]  Unit/Bed#: -01 Encounter: [id number]  Primary Care Provider: Yin Roebrtson MD   Date and time admitted to hospital: 9/12/2022  4:09 PM    Acute diverticulitis  Assessment & Plan  Patient is a 81-year-old male with an extensive medical history recently admitted from 08/1 to 8/10 for acute diverticulitis was on Rocephin and Flagyl, also had an ERCP and a common bile duct stent placed  Who comes in today again with nausea vomiting and diarrhea for several days  · CT of the abdomen and pelvis shows acute sigmoid diverticulitis without drainable fluid collection compared also with to studies on 08/01 it is decreased, also shows common bile duct stent with normal mood bili and air within the gallbladder, distended gallbladder with cholelithiasis concern for acute cholecystitis also thickened and under distended urinary bladder possible cystitis  · Started on Zosyn 3 375 g IV Q 6 hours  · GI and General surgery consult for further evaluation of cholecystitis  · Continue to monitor end points   · C diff check - negative   · Ultrasound right upper quadrant - Distended gallbladder with air with stent in the CBD similar to CT  This could be secondary to the stent, lab correlation for ascending infection          * DORA (acute kidney injury) ([facility] [address] )  Assessment & Plan  · Patient has acute kidney injury with a creatinine of 8 1 and a metabolic acidosis most likely related to renal failure  · Nephrology is following   · Hyperkalemia was treated with dextrose bicarb calcium and insulin last result 4 8   · Last admission patient arrived with for creatinine of 5 97 which resolved to 2 96  · Q 6 hours labs  · Bicarb 125 ml / hour   · Started on with Zaroxolyn 10 mg q 6 and Lasix infusion 40 milligrams/hour  · Urine output ranges between 30-40 ml per hour  · May need hemodialysis - discussed with patient if he wants long-term dialysis      Cholecystitis, acute  Assessment & Plan  Patient's CT scan showed possible acute cholecystitis with pneumobilia and air   Continue on Zosyn q 6  Monitor endpoint  Procalcitonin in the a m  Blood cultures obtained  GI and surgery -  If concern for acute cholecystitis with probably be IR procedure not a candidate for surgery   Ultrasound of right upper quadrant still pending  No Lentz sign no right upper quadrant tenderness stent already in place    Chronic combined systolic and diastolic congestive heart failure (HCC)  Assessment & Plan  Wt Readings from Last 3 Encounters:   09/14/22 118 kg (260 lb 2 3 oz)   09/06/22 117 kg (257 lb 15 oz)   08/30/22 118 kg (260 lb)     Patient has an EF of 40-45% on last admission was volume overloaded and received Lasix  Patient is on torsemide at baseline- on hold  Patient is on a Lasix drip and Zaroxolyn        Stage 3b chronic kidney disease ([facility] [address] )  Assessment & Plan  Lab Results   Component Value Date    EGFR 6 09/15/2022    EGFR 6 09/14/2022    EGFR 5 09/14/2022    CREATININE 7 79 (H) 09/15/2022    CREATININE 8 18 (H) 09/14/2022    CREATININE 8 45 (H) 09/14/2022     Patient had outpatient labs done which showed a creatinine of 7 0 on last admission creatinine was 5 76 which improved to around 2 96 on discharge  Creatinine now is 8 44 most likely related again due to dehydration and diarrhea  Nephrology is consulted  Discussion about dialysis may need to be made  Patient still makes urine  Due to severe metabolic acidosis started on bicarb drip at 105 an hour  Q 6 hours BMP Mag and phos    Chronic atrial fibrillation [facility])  Assessment & Plan  Patient is on Coumadin his INR was 2 56  Will hold Coumadin for now, start on heparin in the a m   Until planned for surgery verses IR procedure  Patient is well controlled on last admission was on metoprolol and went into to 20 with his heart rate received atropine with good response  Patient was started on heparin drip    Morbid obesity (HCC)  Assessment & Plan  Encourage nutrition counseling lifestyle changes and weight loss    Hyperlipidemia  Assessment & Plan  Continue Lipitor    Multiple myeloma [facility])  Assessment & Plan  Patient has a diagnosis of multiple myeloma not having achieved remission at this time last bone marrow biopsy was positive patient's chemotherapy - Velcade and Decadron  · Is seen by outpatient Oncology 8/19/2022  · Last chemotherapy infusion was 9/6/2022    Ambulatory dysfunction  Assessment & Plan  History of the left AKA    Chronic obstructive pulmonary disease, unspecified ([facility] [address] )  Assessment & Plan  Patient has chronic COPD is on 2 L of oxygen as needed in the nursing  Does not appear to have acute exacerbation  Continue Advair and albuterol    GERD (gastroesophageal reflux disease)  Assessment & Plan  Continue Protonix    NALLELY (obstructive sleep apnea)  Assessment & Plan  History of obstructive sleep apnea noncompliance with CPAP    Hypokalemia due to excessive gastrointestinal loss of potassium  Assessment & Plan  · Repeat potassium, received 80 mEq so far  · Keep potassium greater than 4 2  · Most likely related due to loose stools x7     Increased anion gap metabolic acidosis  Assessment & Plan  · Bicarb is at 10 on the BMP and a anion gap is at 20  · Most likely related due to renal failure  · Continue to monitor closely    CKD (chronic kidney disease)  Assessment & Plan  Lab Results   Component Value Date    EGFR 6 09/15/2022    EGFR 6 09/14/2022    EGFR 5 09/14/2022    CREATININE 7 79 (H) 09/15/2022    CREATININE 8 18 (H) 09/14/2022    CREATININE 8 45 (H) 09/14/2022     · Acute on chronic kidney disease creatinine is 8 31 continue to monitor  · Bicarb drip at 125 an hour also Lasix drip at 30 ml/hr and Zaroxolyn 10 q 6  · Q 6 hours labs    Diabetes mellitus, type 2 [facility])  Assessment & Plan  Lab Results   Component Value Date    HGBA1C 6 3 (H) 08/05/2022       Recent Labs 22  2320 22  0522 22  1158 22  1805   POCGLU 146* 153* 126 146*       Blood Sugar Average: Last 72 hrs:  (P) 358 [id number] patient is NPO at this time  Continue insulin sliding scale   Continue  bedtime insulin 10 units  Hold Victoza  If needed start insulin drip        ----------------------------------------------------------------------------------------  HPI/24hr events:   Patient is a 81-year-old male with an extensive medical history who comes in after several days of nausea vomiting and diarrhea significant DORA with a creatinine of 8 3  · Possible HD today  · Patient has multiple BMs at least 6 last night- diarrhea  · Had replete potassium several times during the night    Patient appropriate for transfer out of the ICU today?: No  Disposition: Continue Stepdown Level 1 level of care   Code Status: Level 1 - Full Code  ---------------------------------------------------------------------------------------  SUBJECTIVE  No issues     Review of Systems   Gastrointestinal: Positive for abdominal distention, abdominal pain and diarrhea       Review of systems was reviewed and negative unless stated above in HPI/24-hour events   ---------------------------------------------------------------------------------------  OBJECTIVE    Vitals   Vitals:    22 1800 09/14/22 2200 09/14/22 2237 09/15/22 0229   BP: 112/59 112/59 110/53 112/59   BP Location:    Left arm   Pulse: 58 56 59 60   Resp: 18 21 20 18   Temp:   98 1 °F (36 7 °C) 97 7 °F (36 5 °C)   TempSrc:   Oral Oral   SpO2: 97% 99% 99% 100%   Weight:       Height:         Temp (24hrs), Av 9 °F (36 6 °C), Min:97 7 °F (36 5 °C), Max:98 1 °F (36 7 °C)  Current: Temperature: 97 7 °F (36 5 °C)          Respiratory:  SpO2: SpO2: 100 %, SpO2 Activity: SpO2 Activity: At Rest, SpO2 Device: O2 Device: None (Room air)       Invasive/non-invasive ventilation settings   Respiratory  Report   Lab Data (Last 4 hours)    None         O2/Vent Data (Last 4 hours)    None                Physical Exam  Vitals and nursing note reviewed  Constitutional:       Appearance: He is well-developed  He is obese  He is ill-appearing  HENT:      Head: Normocephalic and atraumatic  Eyes:      Pupils: Pupils are equal, round, and reactive to light  Cardiovascular:      Rate and Rhythm: Bradycardia present  Rhythm irregular  Heart sounds: Murmur heard  Friction rub present  Pulmonary:      Effort: Pulmonary effort is normal       Breath sounds: Normal breath sounds  Abdominal:      General: Bowel sounds are normal  There is distension  Palpations: Abdomen is soft  Tenderness: There is abdominal tenderness  There is no guarding  Genitourinary:     Penis: Normal     Musculoskeletal:         General: Normal range of motion  Cervical back: Normal range of motion and neck supple  Right lower leg: Edema present  Skin:     General: Skin is warm and dry  Capillary Refill: Capillary refill takes less than 2 seconds  Coloration: Skin is pale  Neurological:      Mental Status: He is alert and oriented to person, place, and time               Laboratory and Diagnostics:  Results from last 7 days   Lab Units 09/14/22  1202 09/14/22  0523 09/13/22  0532 09/12/22  1658   WBC Thousand/uL 7 10 6 41 7 02 10 20*   HEMOGLOBIN g/dL 7 8* 7 0* 8 3* 10 3*   HEMATOCRIT % 23 7* 21 0* 26 2* 32 6*   PLATELETS Thousands/uL 135* 123* 124* 130*   NEUTROS PCT % 72 71 73 78*   MONOS PCT % 9 9 9 7     Results from last 7 days   Lab Units 09/15/22  0000 09/14/22  1945 09/14/22  1611 09/14/22  1202 09/14/22  0523 09/13/22  2322 09/13/22  1708 09/13/22  1156 09/13/22  0532 09/12/22  1958 09/12/22  1658   SODIUM mmol/L 140 139 139 139 140 139 140   < > 141   < > 139   POTASSIUM mmol/L 3 1* 3 3* 3 6 3 5 3 2* 3 7 4 0   < > 4 9   < > 5 4*   CHLORIDE mmol/L 104 103 102 102 105 106 107   < > 110*   < > 109*   CO2 mmol/L 21 20* 20* 20* 18* 17* 17*   < > 12* < > 10*   ANION GAP mmol/L 15* 16* 17* 17* 17* 16* 16*   < > 19*   < > 20*   BUN mg/dL 96* 100* 98* 101* 103* 103* 98*   < > 101*   < > 103*   CREATININE mg/dL 7 79* 8 18* 8 45* 8 26* 8 06* 8 17* 8 14*   < > 8 21*   < > 8 44*   CALCIUM mg/dL 7 5* 7 8* 8 0* 8 1* 7 7* 7 6* 7 8*   < > 8 1*   < > 8 5   GLUCOSE RANDOM mg/dL 136 141* 132 117 132 139 127   < > 150*   < > 91   ALT U/L  --   --   --   --  14  --   --   --  16  --  17   AST U/L  --   --   --   --  14  --   --   --  13  --  17   ALK PHOS U/L  --   --   --   --  92  --   --   --  106  --  135*   ALBUMIN g/dL  --   --   --   --  2 3*  --   --   --  2 6*  --  3 3*   TOTAL BILIRUBIN mg/dL  --   --   --   --  0 60  --   --   --  0 60  --  0 40    < > = values in this interval not displayed  Results from last 7 days   Lab Units 09/15/22  0000 09/14/22  1945 09/14/22  1611 09/14/22  1202 09/14/22  0523 09/13/22 2322 09/13/22  1708 09/13/22  1156 09/13/22  0532 09/12/22  2157   MAGNESIUM mg/dL 2 2 2 3 2 1 2 2 1 9 1 5* 1 6 1 6 1 6 1 1*   PHOSPHORUS mg/dL  --   --   --   --  6 1* 5 6* 5 4* 5 4* 5 4* 5 4*      Results from last 7 days   Lab Units 09/14/22  1247 09/14/22  0523 09/13/22 2322 09/13/22  1714 09/13/22  1535 09/13/22  0808 09/13/22  0532 09/12/22  1658   INR   --  2 61*  --   --   --  2 86* 2 93* 2 62*   PTT seconds 65* 72* 112* 140* >210* 57*  --  47*          Results from last 7 days   Lab Units 09/12/22  1658   LACTIC ACID mmol/L 1 2     ABG:    VBG:    Results from last 7 days   Lab Units 09/13/22  0532 09/12/22  1658   PROCALCITONIN ng/ml 0 62* 0 60*       Micro  Results from last 7 days   Lab Units 09/13/22  1708 09/12/22  2157 09/12/22  1658   BLOOD CULTURE   --   --  No Growth at 48 hrs  No Growth at 48 hrs  LEGIONELLA URINARY ANTIGEN   --  Negative  --    STREP PNEUMONIAE ANTIGEN, URINE   --  Negative  --    C DIFF TOXIN B BY PCR  Negative  --   --        EKG: a fib   Imaging: I have personally reviewed pertinent reports        Intake and Output  I/O       09/13 0701 09/14 0700 09/14 0701  09/15 0700    P  O  180 270    I V  (mL/kg) 3686 9 (31 2) 2402 4 (20 4)    IV Piggyback 325 225    Total Intake(mL/kg) 4191 9 (35 5) 2897 4 (24 6)    Urine (mL/kg/hr) 610 (0 2) 378 (0 1)    Stool 0 0    Total Output 610 378    Net +3581 9 +2519 4          Unmeasured Stool Occurrence 3 x 5 x          Height and Weights   Height: 6' 3" (190 5 cm)  IBW (Ideal Body Weight): 84 5 kg  Body mass index is 32 52 kg/m²  Weight (last 2 days)     Date/Time Weight    09/14/22 0600 118 (260 14)    09/14/22 0500 118 (260 58)    09/13/22 0532 115 (253 53)            Nutrition       Diet Orders   (From admission, onward)             Start     Ordered    09/12/22 2045  Diet NPO; Sips with meds  Diet effective now        References:    Nutrtion Support Algorithm Enteral vs  Parenteral   Question Answer Comment   Diet Type NPO    NPO Except: Sips with meds    RD to adjust diet per protocol?  No        09/12/22 2051                  Active Medications  Scheduled Meds:  Current Facility-Administered Medications   Medication Dose Route Frequency Provider Last Rate    albuterol  2 puff Inhalation Q6H PRN Heidemartawanna Parr PA-C      allopurinol  300 mg Oral Daily Julia Parr PA-C      atorvastatin  40 mg Oral After D R  [employer] COLE Parr PA-C      clotrimazole   Topical BID Julia Parr PA-C      fluticasone-vilanterol  1 puff Inhalation Daily Julia Parr PA-C      furosemide  40 mg/hr Intravenous Continuous Romedominguez Lew PA-C 40 mg/hr (09/15/22 0401)    heparin (porcine)  3-20 Units/kg/hr (Order-Specific) Intravenous Titrated Laura Donell, CRNP 5 1 Units/kg/hr (09/15/22 0401)    heparin (porcine)  2,000 Units Intravenous Q1H PRN Laura Donell, CRNP      heparin (porcine)  4,000 Units Intravenous Q1H PRN Laura Donell, CRNP      insulin glargine  10 Units Subcutaneous HS Julia Parr PA-C      insulin lispro  1-6 Units Subcutaneous Q6H [facility] GALVAN JOEY Parr      melatonin  6 mg Oral HS Julia GALVAN JOEY Parr      metolazone  10 mg Oral Q6H NICOLA Calle      pantoprazole  40 mg Oral Early Morning Julia GALVAN JOEY Parr      piperacillin-tazobactam  3 375 g Intravenous Q12H Julia GALVAN JOEY Parr 25 mL/hr at 09/15/22 0401    potassium chloride  20 mEq Intravenous Once Heidemartawanna I JOEY Parr 50 mL/hr at 09/15/22 0401    sodium bicarbonate infusion  125 mL/hr Intravenous Continuous Julia GALVAN JOEY Parr 125 mL/hr (09/15/22 0401)     Continuous Infusions:  furosemide, 40 mg/hr, Last Rate: 40 mg/hr (09/15/22 0401)  heparin (porcine), 3-20 Units/kg/hr (Order-Specific), Last Rate: 5 1 Units/kg/hr (09/15/22 0401)  sodium bicarbonate infusion, 125 mL/hr, Last Rate: 125 mL/hr (09/15/22 0401)      PRN Meds:   albuterol, 2 puff, Q6H PRN  heparin (porcine), 2,000 Units, Q1H PRN  heparin (porcine), 4,000 Units, Q1H PRN        Invasive Devices Review  Invasive Devices  Report    Peripheral Intravenous Line  Duration           Peripheral IV 09/12/22 Dorsal (posterior); Right Forearm 2 days    Peripheral IV 09/12/22 Right Antecubital 2 days    Peripheral IV 09/13/22 Distal;Left;Upper;Ventral (anterior) Arm 1 day          Drain  Duration           Urethral Catheter 14 Fr  2 days                Rationale for remaining devices:   ---------------------------------------------------------------------------------------  Advance Directive and Living Will:      Power of :    POLST:    ---------------------------------------------------------------------------------------  Care Time Delivered:   Upon my evaluation, this patient had a high probability of imminent or life-threatening deterioration due to AKA may need dialysis, which required my direct attention, intervention, and personal management    I have personally provided 45 minutes (0300 to 0345) of critical care time, exclusive of procedures, teaching, family meetings, and any prior time recorded by providers other than myself  Julia Parr PA-C      Portions of the record may have been created with voice recognition software  Occasional wrong word or "sound a like" substitutions may have occurred due to the inherent limitations of voice recognition software    Read the chart carefully and recognize, using context, where substitutions have occurred

## 2022-09-15 NOTE — SEDATION DOCUMENTATION
Temp dialysis cath placed in IR suite  Patient tolerated well and stable for transfer back to ICU via bed on monitor  Report and care assumed by primary RN

## 2022-09-15 NOTE — ASSESSMENT & PLAN NOTE
Lab Results   Component Value Date    HGBA1C 6 3 (H) 08/05/2022       Recent Labs     09/13/22  2320 09/14/22  0522 09/14/22  1158 09/14/22  1805   POCGLU 146* 153* 126 146*       Blood Sugar Average: Last 72 hrs:  (P) 303 [id number] patient is NPO at this time  Continue insulin sliding scale   Continue  bedtime insulin 10 units  Hold Victoza  If needed start insulin drip

## 2022-09-15 NOTE — ASSESSMENT & PLAN NOTE
Patient is a 80-year-old male with an extensive medical history recently admitted from 08/1 to 8/10 for acute diverticulitis was on Rocephin and Flagyl, also had an ERCP and a common bile duct stent placed  Who comes in today again with nausea vomiting and diarrhea for several days  · CT of the abdomen and pelvis shows acute sigmoid diverticulitis without drainable fluid collection compared also with to studies on 08/01 it is decreased, also shows common bile duct stent with normal mood bili and air within the gallbladder, distended gallbladder with cholelithiasis concern for acute cholecystitis also thickened and under distended urinary bladder possible cystitis  · Started on Zosyn 3 375 g IV Q 6 hours  · GI and General surgery consult for further evaluation of cholecystitis  · Continue to monitor end points   · C diff check - negative   · Ultrasound right upper quadrant - Distended gallbladder with air with stent in the CBD similar to CT  This could be secondary to the stent, lab correlation for ascending infection

## 2022-09-15 NOTE — ASSESSMENT & PLAN NOTE
Wt Readings from Last 3 Encounters:   09/14/22 118 kg (260 lb 2 3 oz)   09/06/22 117 kg (257 lb 15 oz)   08/30/22 118 kg (260 lb)     Patient has an EF of 40-45% on last admission was volume overloaded and received Lasix  Patient is on torsemide at baseline- on hold  Patient is on a Lasix drip and Zaroxolyn

## 2022-09-15 NOTE — PROCEDURES
Central Line    Date/Time: 9/15/2022 3:35 PM  Performed by: Julianne Nelson MD  Authorized by: Julianne Nelson MD     Patient location:    Universal protocol:     Procedure explained and questions answered to patient or proxy's satisfaction: yes      Required blood products, implants, devices, and special equipment available: yes      Immediately prior to procedure, a time out was called: yes      Patient identity confirmed:  Verbally with patient, arm band and provided demographic data  Pre-procedure details:     Hand hygiene: Hand hygiene performed prior to insertion      Sterile barrier technique: All elements of maximal sterile technique followed      Skin preparation:  2% chlorhexidine    Skin preparation agent: Skin preparation agent completely dried prior to procedure    Indications:     Central line indications: dialysis    Anesthesia (see MAR for exact dosages): Anesthesia method:  Local infiltration    Local anesthetic:  Lidocaine 1% w/o epi  Procedure details:     Location:  Right internal jugular    Approach: percutaneous technique used      Catheter type:  Double lumen    Catheter size:  14 Fr    Successful placement: yes      Vessel of catheter tip end:  RA  Post-procedure details:     Post-procedure:  Line sutured and dressing applied    Assessment:  Blood return through all ports and placement verified by x-ray    Post-procedure complications: none      Patient tolerance of procedure:   Tolerated well, no immediate complications

## 2022-09-15 NOTE — QUICK NOTE
Attempted to call patient's son Eddy Tarrant via cell phone, left voicemail to call back  for additional information/updates

## 2022-09-15 NOTE — TELEMEDICINE
e-Consult (IPC)  - Interventional Radiology  Nate Grit 73 y o  male MRN: [id number]  Unit/Bed#: -01 Encounter: [id number]          Interventional Radiology has been consulted to evaluate Nate Grit      IP Consult to IR  Consult performed by: Aide Dill MD  Consult ordered by: NICOLA Adam        09/15/22    Assessment/Recommendation:   Consult for tunneled hemodialysis catheter  71-year-old man with history of multiple myeloma and heart failure with progressive renal failure  Planned to initiate hemodialysis tomorrow per nephrology  NPO after midnight    11-20 minutes, >50% of the total time devoted to medical consultative verbal/EMR discussion between providers  Written report will be generated in the EMR  Thank you for allowing Interventional Radiology to participate in the care of Nate Grit  Please don't hesitate to call or TigerText us with any questions       Johnnie Napoles MD

## 2022-09-15 NOTE — PROGRESS NOTES
Progress note - Gastroenterology   Julieth Dumont 73 y o  male MRN: [id number]  Unit/Bed#: -01 Encounter: [id number]    ASSESSMENT and PLAN    1  Acute diverticulitis  2  Nausea, vomiting, diarrhea  Patient was recently admitted in the beginning of August for acute diverticulitis   Colonoscopy 7/25 noted pre cancerous polyp, mild diverticula in the descending and sigmoid colon, small internal hemorrhoid      CT abdomen and pelvis on admission; Acute sigmoid diverticulitis without drainable fluid collection, decreased compared to prior study   Colonoscopy  recommended to rule out possibility of colon cancer mimicking diverticulitis    Common bile duct stent with pneumobilia and air within the gallbladder    Distended gallbladder with cholelithiasis   Cannot rule out acute cholecystitis      On exam patient denies nausea or vomiting   Patient does state he has generalized abdominal pain with and without palpation  Patient is tolerating clear liquid fluids at this time  Per patient's nurse patient continues to have diarrhea  No overt GI bleeding noted      - tolerating clear liquids   - pantoprazole 40 mg daily  - IV antibiotics  - stool for C diff negative   - stool enteric pending     3  Cholelithiasis  CT abdomen and pelvis on admission; Common bile duct stent with pneumobilia and air within the gallbladder  Distended gallbladder with cholelithiasis   Cannot rule out acute cholecystitis   Liver enzymes remain normal as of 9/14  Lipase 691 on admission       - surgical consult, patient not a surgical candidate   If concern for cholecystitis would recommend a cholecystotomy tube      4  Anemia  Hemoglobin on admission 10 3, with a m  Labs today 7 1     Recent 08/07/2022 drop to 6 2    Colonoscopy 7/25/22, ERCP 8/29/22, no source of bleeding noted      Per patient's nurse, he is not having any overt GI bleeding      - monitor hemoglobin, transfuse as necessary     Discussed patient with Dr Johan Bryan on rounds for evaluation EGD if hemoglobin continues to decline      5  Ampullary adenoma   ERCP 8/20 09/2022, Abi Patel esophagus seen during EGD, biopsy obtained   ERCP performed with endoscopic mucosal resection of abnormal looking ampullary tissue, PD, CBD stent placed      6  DORA (acute kidney injury)  Creatinine on admission 8 31  (08/10/2022, creatinine 2 96)  With a m  Labs today 8 45  Per patient's nurse, plan is to give patient dialysis       7  Chronic atrial fibrillation  Patient currently is on Coumadin as outpatient   Coumadin on hold      8  Multiple myeloma  Patient has diagnosis of multiple myeloma not having achieved remission at this time, last bone marrow biopsy was positive  Chemotherapy- Velcade and Decadron   Followed by Oncology as outpatient  Keyana Pérezyasmin chemotherapy infusion was 09/06/2022     9  Diabetes mellitus      Chief Complaint   Patient presents with    Abnormal Lab     Patient arrives via EMS from [facility] for an elevated creatinine of 7 3  Reports yellow emesis for a few days  Patient has RUQ and RLQ abdominal pain, is currently getting chemo once a week for kidney cancer  SUBJECTIVE/HPI   Patient appears a little more alert this morning  He continues to complain of abdominal pain with and without palpation  He is tolerating clear liquids at this time  Per patient's nurse he is still having diarrhea  No overt GI bleeding noted  Plan is to place temporary cath for patient to receive dialysis      /57 (BP Location: Left arm)   Pulse 56   Temp (!) 97 3 °F (36 3 °C) (Oral)   Resp 17   Ht 6' 3" (1 905 m)   Wt 119 kg (262 lb 9 1 oz)   SpO2 100%   BMI 32 82 kg/m²     PHYSICALEXAM  General appearance: alert, appears stated age and cooperative  Eyes: PERLLA, EOMI, no icterus   Head: Normocephalic, without obvious abnormality, atraumatic  Lungs: clear to auscultation bilaterally  Heart: regular rate and rhythm, S1, S2 normal, no murmur, click, rub or gallop  Abdomen: soft, non-tender; bowel sounds normal; no masses,  no organomegaly  Extremities: extremities normal, atraumatic, no cyanosis or edema  Neurologic: Grossly normal    Lab Results   Component Value Date    GLUCOSE 64 (L) 08/01/2022    CALCIUM 7 8 (L) 09/15/2022     01/15/2018    K 3 4 (L) 09/15/2022    CO2 24 09/15/2022     09/15/2022    BUN 97 (H) 09/15/2022    CREATININE 8 44 (H) 09/15/2022     Lab Results   Component Value Date    WBC 5 77 09/15/2022    HGB 7 1 (L) 09/15/2022    HCT 20 9 (L) 09/15/2022    MCV 81 (L) 09/15/2022     (L) 09/15/2022     Lab Results   Component Value Date    ALT 14 09/14/2022    AST 14 09/14/2022     (H) 11/03/2019    ALKPHOS 92 09/14/2022    BILITOT 0 6 01/15/2018     No results found for: AMYLASE  Lab Results   Component Value Date    LIPASE 691 (H) 09/13/2022     Lab Results   Component Value Date    IRON 17 (L) 08/04/2022    TIBC 206 (L) 08/04/2022    FERRITIN 165 08/04/2022     Lab Results   Component Value Date    INR 2 61 (H) 09/14/2022

## 2022-09-15 NOTE — ASSESSMENT & PLAN NOTE
· Patient has acute kidney injury with a creatinine of 8 1 and a metabolic acidosis most likely related to renal failure  · Nephrology is following   · Hyperkalemia was treated with dextrose bicarb calcium and insulin last result 4 8   · Last admission patient arrived with for creatinine of 5 97 which resolved to 2 96  · Q 6 hours labs  · Bicarb 125 ml / hour   · Started on with Zaroxolyn 10 mg q 6 and Lasix infusion 40 milligrams/hour  · Urine output ranges between 30-40 ml per hour  · May need hemodialysis - discussed with patient if he wants long-term dialysis

## 2022-09-15 NOTE — PROGRESS NOTES
NEPHROLOGY PROGRESS NOTE   Delroy Rojas 73 y o  male MRN: [id number]  Unit/Bed#: -01 Encounter: [id number]      HPI/24hr EVENTS:    -30-year-old male past medical history of multiple myeloma currently being treated with Velcade and Decadron, CKD 3 with recent DORA/non HD with creatinine of 2 9 at discharge, heart failure with reduced ejection fraction, hypertension, morbid obesity    Presented with abnormal outpatient lab work/DORA, reports multiple days of diarrhea and decreased oral intake, presentation similar to recent in August    -multiple episodes diarrhea overnight, 500 L urine output over past 24 hours on Lasix infusion, received albumin 12 5 g 5% overnight, GFR remains essentially unchanged    ASSESSMENT/PLAN:    DORA (POA)  -Baseline creatinine: prior to 10/2021 was 1 2 - 1 4, had has recurrent episodes of non HD DORA, more recently baseline was 1 8-2 2, recent hospitalization in early August with DORA with a serum creatinine of 5 on presentation which improved to 2 96 at time of discharge 8/10  -Creatinine on admission 8 44, most recent creatinine 8 06 (GFR remains between 5 and 6)  -Etiology:  Suspect prerenal azotemia 2nd to severe diarrhea with reduced oral intake, sepsis due to diverticulitis, possible conversion to ATN  -UA:  [place], negative blood/protein  -Renal imaging:  CT abdomen pelvis without contrast on admission-nonobstructing left-sided punctate intrarenal calculi, left-sided, Renal cyst is seen no mention of hydronephrosis  -Avoid hypotension, avoid nephrotoxins, avoid NSAIDS  -Trend BMP  -received 500 mL 0 9% saline bolus x2 on admission, additional 500 mL isolate bolus earlier this morning, started on currently on Plasma-Lyte at 100 mL/hour  -strict I&Os, daily urine output 610 mL and 503 mL/24 hours  on Lasix infusion  -ongoing oliguria despite IV fluid resuscitation, was given Lasix bolus and started on Lasix infusion currently on 40 milligrams/hour with metolazone 10 mg q 6 hours  -lengthy discussion bedside regarding possible requirements of renal replacement therapy in the coming hours to day's depending on patient's response to current treatments   This included risks and benefits associated with dialysis including infection, arrhythmia, sudden death   Reviewed modalities of dialysis/renal replacement therapy pre and indications for renal replacement therapy with patient   Patient consented for dialysis, placed in chart   -no urgent indication for dialysis currently but given marginal urine output with no improvement in renal function over the past several days will pursue HD PermCath placement and initiate dialysis likely tomorrow, discussed with patient bedside, discussed with Dr Lidia Ford  -consult IR for HD PermCath placement     CKD IIIb  -Etiology:  Suspected diabetic nephropathy, hypertensive nephrosclerosis, cardiorenal syndrome, possible underlying myeloma  -Follows with Dr Pk Yu OP, last seen [id number]     Metabolic acidosis  -bicarb 10 admission with anion gap 20, chloride 109, lactic 1 2  -likely 2nd to DORA and GI loss of bicarbonate via diarrhea  -started on sodium bicarbonate infusion, most recent bicarb normalized to 24 anion gap improved 14, can stop bicarb infusion transition to Plasma-Lyte at 100 mL/hour  -continue trend BMP, plan for HD tomorrow     Hypokalemia   -was initially hyperkalemic on admission at 5 4,   -most recently 3 4  -likely due to diuretics/bicarb infusion  -now off bicarb infusion on Plasma-Lyte trend BMP  -continue to trend BMP, continue careful repletion the setting of DORA     Hypocalcemia  -ionized calcium 1 01  -received 2 g calcium gluconate     Anemia  -hemoglobin 10 3 on admission, now 7 1  after IV fluid resuscitation  -in the setting of known multiple myeloma  -with DORA and thrombocytopenia consider further TMA workup  -recommend transfuse for goal greater than 7 per primary team     Heart failure with reduced ejection fraction  -10/21 TTE:  EF 45%, mild mitral valve regurgitation  -outpatient diuretic torsemide 40 mg b i d , currently on hold  -weight on admission via bed scale 115 kg,  most recent bed scale weight 119 kg  -reported recent 20 lb weight loss as outpatient in the setting of poor oral intake and diarrhea  -daily weights monitor intake and output  -currently on room air     Multiple myeloma  -under recent bone marrow biopsy 9/8  -follows with Dr Jia Rae from Hematology-Oncology  -currently on Velcade and Decadron per Heme-Onc, last treatment 9/6     Hypertension  -only outpatient medication is torsemide  - recent blood pressure is acceptable, started on midodrine 2 5 mg t i d  For low/normal blood pressures     Ampulary adenoma  -s/p ERCP with endoscopic mucosal resection s/p PD and CBD stent placement by GI in 7/2022  -Ct scan with CBD stent and pneumobilia and air within gallbladdder  -Surgery consulted/GI consulted     Acute Diverticulitis   -CT AP with acute sigmoid diverticulitis without drainable collection  -On zosyn per primary team  -GI and surgery consulted  -Care per primary team     Hyperphosphatemia  -in the setting of acute renal failure, phosphorus 6 1,  -trend, recommend renal diet, might require binders      Hypomagnesemia  -magnesium 1 1 on admission most recently 2 1   -etiology likely secondary to acute GI loss, trend     Atrial fibrillation  -on Coumadin as outpatient, currently held in favor of heparin infusion     Advanced care planning  -reviewed palliative care meeting notes      Addition medical problems:  Morbid obesity, dm 2 last A1c 6 3     Discussed with critical care AP    SUBJECTIVE:  Denies acute complaints, reports he had multiple episodes of diarrhea overnight, denies any pain at this time, reports he slept well overnight, reports he is tolerating his breakfast well  ROS:  Review of Systems   Constitutional: Negative  HENT: Negative  Respiratory: Negative  Cardiovascular: Negative  Gastrointestinal: Positive for diarrhea  Genitourinary: Negative  Musculoskeletal: Negative  Neurological: Negative  All other systems reviewed and are negative  OBJECTIVE:  Current Weight: Weight - Scale: 119 kg (262 lb 9 1 oz)  Vitals:    09/14/22 2237 09/15/22 0229 09/15/22 0535 09/15/22 0623   BP: 110/53 112/59  117/57   BP Location:  Left arm  Left arm   Pulse: 59 60  56   Resp: 20 18  17   Temp: 98 1 °F (36 7 °C) 97 7 °F (36 5 °C)  (!) 97 3 °F (36 3 °C)   TempSrc: Oral Oral  Oral   SpO2: 99% 100%  100%   Weight:   119 kg (263 lb 0 1 oz) 119 kg (262 lb 9 1 oz)   Height:           Intake/Output Summary (Last 24 hours) at 9/15/2022 0907  Last data filed at 9/15/2022 0900  Gross per 24 hour   Intake 4215 93 ml   Output 516 ml   Net 3699 93 ml     Physical Exam  Vitals and nursing note reviewed  Constitutional:       General: He is not in acute distress  Appearance: Normal appearance  He is obese  He is not toxic-appearing or diaphoretic  Comments: Awake sitting in bed eating breakfast   HENT:      Head: Normocephalic and atraumatic  Nose: Nose normal       Mouth/Throat:      Mouth: Mucous membranes are moist    Eyes:      General: No scleral icterus  Cardiovascular:      Rate and Rhythm: Normal rate and regular rhythm  Pulses: Normal pulses  Heart sounds: Normal heart sounds  Pulmonary:      Effort: Pulmonary effort is normal  No respiratory distress  Breath sounds: Normal breath sounds  No wheezing or rales  Abdominal:      General: Abdomen is flat  There is no distension  Palpations: Abdomen is soft  Tenderness: There is no abdominal tenderness  Genitourinary:     Comments: [place] catheter with clear urine in bag  Musculoskeletal:      Cervical back: Neck supple  Comments: Left AKA  Trace right lower extremity edema   Skin:     General: Skin is warm and dry  Capillary Refill: Capillary refill takes less than 2 seconds     Neurological: General: No focal deficit present  Mental Status: He is alert            Medications:    Current Facility-Administered Medications:     acetaminophen (TYLENOL) tablet 650 mg, 650 mg, Oral, Q6H PRN, Ashleigh Barbosa PA-C    albuterol (PROVENTIL HFA,VENTOLIN HFA) inhaler 2 puff, 2 puff, Inhalation, Q6H PRN, Julia Parr PA-C    allopurinol (ZYLOPRIM) tablet 300 mg, 300 mg, Oral, Daily, Julia Parr PA-C, 300 mg at 09/15/22 0835    atorvastatin (LIPITOR) tablet 40 mg, 40 mg, Oral, After Dinner, Julia Parr PA-C, 40 mg at 09/14/22 1805    clotrimazole (LOTRIMIN) 1 % cream, , Topical, BID, Julia Parr PA-C, Given at 09/15/22 0834    fluticasone-vilanterol (BREO ELLIPTA) 200-25 MCG/INH inhaler 1 puff, 1 puff, Inhalation, Daily, Julia Parr PA-C, 1 puff at 09/15/22 0852    furosemide (LASIX) 500 mg infusion 50 mL, 40 mg/hr, Intravenous, Continuous, Ashleigh Barbosa PA-C, Last Rate: 4 mL/hr at 09/15/22 0601, 40 mg/hr at 09/15/22 0601    heparin (porcine) 25,000 units in 0 45% NaCl 250 mL infusion (premix), 3-20 Units/kg/hr (Order-Specific), Intravenous, Titrated, NICOLA Calle, Last Rate: 4 6 mL/hr at 09/15/22 0601, 5 1 Units/kg/hr at 09/15/22 0601    heparin (porcine) injection 2,000 Units, 2,000 Units, Intravenous, Q1H PRN, NICOLA David    heparin (porcine) injection 4,000 Units, 4,000 Units, Intravenous, Q1H PRN, NICOLA David    insulin glargine (LANTUS) subcutaneous injection 10 Units 0 1 mL, 10 Units, Subcutaneous, HS, Julia Parr PA-C, 10 Units at 09/14/22 2101    insulin lispro (HumaLOG) 100 units/mL subcutaneous injection 1-6 Units, 1-6 Units, Subcutaneous, Q6H [address], 1 Units at 09/14/22 0643 **AND** Fingerstick Glucose (POCT), , , Q6H, Julia Parr PA-C    melatonin tablet 6 mg, 6 mg, Oral, HS, Julia Parr PA-C, 6 mg at 09/14/22 2101    metolazone (ZAROXOLYN) tablet 10 mg, 10 mg, Oral, Q6H, NICOLA Thomas, 10 mg at 09/15/22 0523    midodrine (PROAMATINE) tablet 2 5 mg, 2 5 mg, Oral, TID AC, Eunice Washburn PA-C, 2 5 mg at 09/15/22 0646    multi-electrolyte (PLASMALYTE-A/ISOLYTE-S PH 7 4) IV solution, 100 mL/hr, Intravenous, Continuous, Eunice Washburn PA-C, Last Rate: 100 mL/hr at 09/15/22 0900, 100 mL/hr at 09/15/22 0900    pantoprazole (PROTONIX) EC tablet 40 mg, 40 mg, Oral, Early Morning, Julia Parr PA-C, 40 mg at 09/15/22 0523    piperacillin-tazobactam (ZOSYN) 3 375 g in sodium chloride 0 9 % 100 mL IVPB (EXTENDED-INFUSION), 3 375 g, Intravenous, Q12H, Julia Parr PA-C, Last Rate: 25 mL/hr at 09/15/22 0601, Rate Verify at 09/15/22 0601    potassium chloride (K-DUR,KLOR-CON) CR tablet 20 mEq, 20 mEq, Oral, Once, Eunice Washburn PA-C    potassium chloride 20 mEq IVPB (premix), 20 mEq, Intravenous, Once, Eunice Washburn PA-C, Last Rate: 50 mL/hr at 09/15/22 0845, 20 mEq at 09/15/22 0845    Laboratory Results:  Results from last 7 days   Lab Units 09/15/22  0805 09/15/22  0529 09/15/22  0000 09/14/22  1945 09/14/22  1611 09/14/22  1202 09/14/22  0523 09/13/22  2322 09/13/22  1708 09/13/22  1156 09/13/22  0532 09/13/22  0107 09/12/22  2157 09/12/22  1958 09/12/22  1658   WBC Thousand/uL  --  5 77  --   --   --  7 10 6 41  --   --   --  7 02  --   --   --  10 20*   HEMOGLOBIN g/dL 7 1* 6 8*  --   --   --  7 8* 7 0*  --   --   --  8 3*  --   --   --  10 3*   HEMATOCRIT %  --  20 9*  --   --   --  23 7* 21 0*  --   --   --  26 2*  --   --   --  32 6*   PLATELETS Thousands/uL  --  141*  --   --   --  135* 123*  --   --   --  124*  --   --   --  130*   POTASSIUM mmol/L 3 4* 3 6 3 1* 3 3* 3 6 3 5 3 2* 3 7 4 0 4 5 4 9   < > 5 4*   < > 5 4*   CHLORIDE mmol/L 101 101 104 103 102 102 105 106 107 109* 110*   < > 111*   < > 109*   CO2 mmol/L 24 24 21 20* 20* 20* 18* 17* 17* 17* 12*   < > 12*   < > 10*   BUN mg/dL 97* 100* 96* 100* 98* 101* 103* 103* 98* 102* 101*   < > 104*   < > 103*   CREATININE mg/dL 8 44* 8 45* 7 79* 8 18* 8 45* 8 26* 8 06* 8 17* 8 14* 8 20* 8 21*   < > 8 32*   < > 8 44*   CALCIUM mg/dL 7 8* 8 3 7 5* 7 8* 8 0* 8 1* 7 7* 7 6* 7 8* 7 9* 8 1*   < > 8 4   < > 8 5   MAGNESIUM mg/dL 2 1 2 5 2 2 2 3 2 1 2 2 1 9 1 5* 1 6 1 6 1 6  --  1 1*   < >  --    PHOSPHORUS mg/dL  --   --   --   --   --   --  6 1* 5 6* 5 4* 5 4* 5 4*  --  5 4*  --   --     < > = values in this interval not displayed  I have personally reviewed the blood work as stated above and in my note  I have personally reviewed critical care note

## 2022-09-15 NOTE — ASSESSMENT & PLAN NOTE
Lab Results   Component Value Date    EGFR 6 09/15/2022    EGFR 6 09/14/2022    EGFR 5 09/14/2022    CREATININE 7 79 (H) 09/15/2022    CREATININE 8 18 (H) 09/14/2022    CREATININE 8 45 (H) 09/14/2022     · Acute on chronic kidney disease creatinine is 8 31 continue to monitor  · Bicarb drip at 125 an hour also Lasix drip at 30 ml/hr and Zaroxolyn 10 q 6  · Q 6 hours labs

## 2022-09-15 NOTE — ASSESSMENT & PLAN NOTE
Lab Results   Component Value Date    EGFR 6 09/15/2022    EGFR 6 09/14/2022    EGFR 5 09/14/2022    CREATININE 7 79 (H) 09/15/2022    CREATININE 8 18 (H) 09/14/2022    CREATININE 8 45 (H) 09/14/2022     Patient had outpatient labs done which showed a creatinine of 7 0 on last admission creatinine was 5 76 which improved to around 2 96 on discharge  Creatinine now is 8 44 most likely related again due to dehydration and diarrhea  Nephrology is consulted  Discussion about dialysis may need to be made  Patient still makes urine  Due to severe metabolic acidosis started on bicarb drip at 105 an hour  Q 6 hours BMP Mag and phos

## 2022-09-16 ENCOUNTER — APPOINTMENT (INPATIENT)
Dept: DIALYSIS | Facility: HOSPITAL | Age: 72
DRG: 674 | End: 2022-09-16
Payer: COMMERCIAL

## 2022-09-16 LAB
ANION GAP SERPL CALCULATED.3IONS-SCNC: 12 MMOL/L (ref 4–13)
ANION GAP SERPL CALCULATED.3IONS-SCNC: 14 MMOL/L (ref 4–13)
ANION GAP SERPL CALCULATED.3IONS-SCNC: 16 MMOL/L (ref 4–13)
ANION GAP SERPL CALCULATED.3IONS-SCNC: 16 MMOL/L (ref 4–13)
ANION GAP SERPL CALCULATED.3IONS-SCNC: 17 MMOL/L (ref 4–13)
ANION GAP SERPL CALCULATED.3IONS-SCNC: 19 MMOL/L (ref 4–13)
APTT PPP: 67 SECONDS (ref 23–37)
BUN SERPL-MCNC: 63 MG/DL (ref 5–25)
BUN SERPL-MCNC: 66 MG/DL (ref 5–25)
BUN SERPL-MCNC: 68 MG/DL (ref 5–25)
BUN SERPL-MCNC: 91 MG/DL (ref 5–25)
BUN SERPL-MCNC: 97 MG/DL (ref 5–25)
BUN SERPL-MCNC: 98 MG/DL (ref 5–25)
CALCIUM SERPL-MCNC: 8.1 MG/DL (ref 8.3–10.1)
CALCIUM SERPL-MCNC: 8.3 MG/DL (ref 8.3–10.1)
CALCIUM SERPL-MCNC: 8.3 MG/DL (ref 8.3–10.1)
CALCIUM SERPL-MCNC: 8.4 MG/DL (ref 8.3–10.1)
CHLORIDE SERPL-SCNC: 100 MMOL/L (ref 96–108)
CHLORIDE SERPL-SCNC: 101 MMOL/L (ref 96–108)
CHLORIDE SERPL-SCNC: 102 MMOL/L (ref 96–108)
CHLORIDE SERPL-SCNC: 103 MMOL/L (ref 96–108)
CO2 SERPL-SCNC: 20 MMOL/L (ref 21–32)
CO2 SERPL-SCNC: 20 MMOL/L (ref 21–32)
CO2 SERPL-SCNC: 21 MMOL/L (ref 21–32)
CO2 SERPL-SCNC: 21 MMOL/L (ref 21–32)
CO2 SERPL-SCNC: 23 MMOL/L (ref 21–32)
CO2 SERPL-SCNC: 24 MMOL/L (ref 21–32)
CREAT SERPL-MCNC: 6.45 MG/DL (ref 0.6–1.3)
CREAT SERPL-MCNC: 6.89 MG/DL (ref 0.6–1.3)
CREAT SERPL-MCNC: 7.35 MG/DL (ref 0.6–1.3)
CREAT SERPL-MCNC: 8.94 MG/DL (ref 0.6–1.3)
CREAT SERPL-MCNC: 9.11 MG/DL (ref 0.6–1.3)
CREAT SERPL-MCNC: 9.13 MG/DL (ref 0.6–1.3)
ERYTHROCYTE [DISTWIDTH] IN BLOOD BY AUTOMATED COUNT: 19 % (ref 11.6–15.1)
GFR SERPL CREATININE-BSD FRML MDRD: 5 ML/MIN/1.73SQ M
GFR SERPL CREATININE-BSD FRML MDRD: 6 ML/MIN/1.73SQ M
GFR SERPL CREATININE-BSD FRML MDRD: 7 ML/MIN/1.73SQ M
GFR SERPL CREATININE-BSD FRML MDRD: 7 ML/MIN/1.73SQ M
GLUCOSE SERPL-MCNC: 112 MG/DL (ref 65–140)
GLUCOSE SERPL-MCNC: 115 MG/DL (ref 65–140)
GLUCOSE SERPL-MCNC: 122 MG/DL (ref 65–140)
GLUCOSE SERPL-MCNC: 124 MG/DL (ref 65–140)
GLUCOSE SERPL-MCNC: 148 MG/DL (ref 65–140)
GLUCOSE SERPL-MCNC: 162 MG/DL (ref 65–140)
GLUCOSE SERPL-MCNC: 186 MG/DL (ref 65–140)
GLUCOSE SERPL-MCNC: 78 MG/DL (ref 65–140)
GLUCOSE SERPL-MCNC: 83 MG/DL (ref 65–140)
GLUCOSE SERPL-MCNC: 84 MG/DL (ref 65–140)
GLUCOSE SERPL-MCNC: 86 MG/DL (ref 65–140)
GLUCOSE SERPL-MCNC: 87 MG/DL (ref 65–140)
HBV CORE AB SER QL: NORMAL
HBV CORE IGM SER QL: NORMAL
HBV SURFACE AB SER-ACNC: <3.1 MIU/ML
HBV SURFACE AG SER QL: NORMAL
HCT VFR BLD AUTO: 23.6 % (ref 36.5–49.3)
HCV AB SER QL: NORMAL
HGB BLD-MCNC: 7.6 G/DL (ref 12–17)
INR PPP: 2.54 (ref 0.84–1.19)
MAGNESIUM SERPL-MCNC: 2.1 MG/DL (ref 1.6–2.6)
MAGNESIUM SERPL-MCNC: 2.1 MG/DL (ref 1.6–2.6)
MAGNESIUM SERPL-MCNC: 2.2 MG/DL (ref 1.6–2.6)
MAGNESIUM SERPL-MCNC: 2.3 MG/DL (ref 1.6–2.6)
MAGNESIUM SERPL-MCNC: 2.5 MG/DL (ref 1.6–2.6)
MAGNESIUM SERPL-MCNC: 2.7 MG/DL (ref 1.6–2.6)
MCH RBC QN AUTO: 26.4 PG (ref 26.8–34.3)
MCHC RBC AUTO-ENTMCNC: 32.2 G/DL (ref 31.4–37.4)
MCV RBC AUTO: 82 FL (ref 82–98)
MRSA NOSE QL CULT: NORMAL
PLATELET # BLD AUTO: 166 THOUSANDS/UL (ref 149–390)
PMV BLD AUTO: 10.6 FL (ref 8.9–12.7)
POTASSIUM SERPL-SCNC: 3.3 MMOL/L (ref 3.5–5.3)
POTASSIUM SERPL-SCNC: 3.4 MMOL/L (ref 3.5–5.3)
POTASSIUM SERPL-SCNC: 3.6 MMOL/L (ref 3.5–5.3)
POTASSIUM SERPL-SCNC: 3.7 MMOL/L (ref 3.5–5.3)
POTASSIUM SERPL-SCNC: 3.7 MMOL/L (ref 3.5–5.3)
POTASSIUM SERPL-SCNC: 4.1 MMOL/L (ref 3.5–5.3)
PROTHROMBIN TIME: 28.6 SECONDS (ref 11.6–14.5)
RBC # BLD AUTO: 2.88 MILLION/UL (ref 3.88–5.62)
SODIUM SERPL-SCNC: 138 MMOL/L (ref 135–147)
SODIUM SERPL-SCNC: 138 MMOL/L (ref 135–147)
SODIUM SERPL-SCNC: 139 MMOL/L (ref 135–147)
SODIUM SERPL-SCNC: 140 MMOL/L (ref 135–147)
WBC # BLD AUTO: 5.82 THOUSAND/UL (ref 4.31–10.16)

## 2022-09-16 PROCEDURE — 5A1D70Z PERFORMANCE OF URINARY FILTRATION, INTERMITTENT, LESS THAN 6 HOURS PER DAY: ICD-10-PCS | Performed by: INTERNAL MEDICINE

## 2022-09-16 PROCEDURE — NC001 PR NO CHARGE: Performed by: INTERNAL MEDICINE

## 2022-09-16 PROCEDURE — 86705 HEP B CORE ANTIBODY IGM: CPT | Performed by: NURSE PRACTITIONER

## 2022-09-16 PROCEDURE — 99232 SBSQ HOSP IP/OBS MODERATE 35: CPT | Performed by: INTERNAL MEDICINE

## 2022-09-16 PROCEDURE — 84165 PROTEIN E-PHORESIS SERUM: CPT | Performed by: PHYSICIAN ASSISTANT

## 2022-09-16 PROCEDURE — 82948 REAGENT STRIP/BLOOD GLUCOSE: CPT

## 2022-09-16 PROCEDURE — 83735 ASSAY OF MAGNESIUM: CPT | Performed by: PHYSICIAN ASSISTANT

## 2022-09-16 PROCEDURE — 85027 COMPLETE CBC AUTOMATED: CPT | Performed by: PHYSICIAN ASSISTANT

## 2022-09-16 PROCEDURE — 86704 HEP B CORE ANTIBODY TOTAL: CPT | Performed by: NURSE PRACTITIONER

## 2022-09-16 PROCEDURE — 80048 BASIC METABOLIC PNL TOTAL CA: CPT | Performed by: PHYSICIAN ASSISTANT

## 2022-09-16 PROCEDURE — 85730 THROMBOPLASTIN TIME PARTIAL: CPT | Performed by: PHYSICIAN ASSISTANT

## 2022-09-16 PROCEDURE — 86706 HEP B SURFACE ANTIBODY: CPT | Performed by: NURSE PRACTITIONER

## 2022-09-16 PROCEDURE — 82784 ASSAY IGA/IGD/IGG/IGM EACH: CPT | Performed by: PHYSICIAN ASSISTANT

## 2022-09-16 PROCEDURE — 85610 PROTHROMBIN TIME: CPT | Performed by: PHYSICIAN ASSISTANT

## 2022-09-16 PROCEDURE — 86803 HEPATITIS C AB TEST: CPT | Performed by: NURSE PRACTITIONER

## 2022-09-16 PROCEDURE — 90935 HEMODIALYSIS ONE EVALUATION: CPT | Performed by: NURSE PRACTITIONER

## 2022-09-16 PROCEDURE — 99291 CRITICAL CARE FIRST HOUR: CPT | Performed by: PHYSICIAN ASSISTANT

## 2022-09-16 PROCEDURE — 87340 HEPATITIS B SURFACE AG IA: CPT | Performed by: NURSE PRACTITIONER

## 2022-09-16 RX ORDER — SODIUM CHLORIDE 9 MG/ML
10 INJECTION, SOLUTION INTRAVENOUS CONTINUOUS
Status: DISCONTINUED | OUTPATIENT
Start: 2022-09-16 | End: 2022-09-18

## 2022-09-16 RX ORDER — CHOLESTYRAMINE LIGHT 4 G/5.7G
4 POWDER, FOR SUSPENSION ORAL 2 TIMES DAILY
Status: DISCONTINUED | OUTPATIENT
Start: 2022-09-16 | End: 2022-10-04 | Stop reason: HOSPADM

## 2022-09-16 RX ORDER — MAGNESIUM SULFATE HEPTAHYDRATE 40 MG/ML
2 INJECTION, SOLUTION INTRAVENOUS ONCE
Status: COMPLETED | OUTPATIENT
Start: 2022-09-16 | End: 2022-09-16

## 2022-09-16 RX ORDER — POTASSIUM CHLORIDE 14.9 MG/ML
20 INJECTION INTRAVENOUS ONCE
Status: COMPLETED | OUTPATIENT
Start: 2022-09-16 | End: 2022-09-16

## 2022-09-16 RX ORDER — TORSEMIDE 20 MG/1
60 TABLET ORAL DAILY
Status: DISCONTINUED | OUTPATIENT
Start: 2022-09-16 | End: 2022-09-17

## 2022-09-16 RX ORDER — POTASSIUM CHLORIDE 14.9 MG/ML
20 INJECTION INTRAVENOUS
Status: COMPLETED | OUTPATIENT
Start: 2022-09-16 | End: 2022-09-16

## 2022-09-16 RX ORDER — INSULIN LISPRO 100 [IU]/ML
1-6 INJECTION, SOLUTION INTRAVENOUS; SUBCUTANEOUS EVERY 6 HOURS SCHEDULED
Status: DISCONTINUED | OUTPATIENT
Start: 2022-09-16 | End: 2022-09-20

## 2022-09-16 RX ORDER — LOPERAMIDE HYDROCHLORIDE 2 MG/1
2 CAPSULE ORAL 4 TIMES DAILY PRN
Status: DISCONTINUED | OUTPATIENT
Start: 2022-09-16 | End: 2022-09-29

## 2022-09-16 RX ORDER — POTASSIUM CHLORIDE 20 MEQ/1
40 TABLET, EXTENDED RELEASE ORAL ONCE
Status: COMPLETED | OUTPATIENT
Start: 2022-09-16 | End: 2022-09-16

## 2022-09-16 RX ADMIN — POTASSIUM CHLORIDE 40 MEQ: 1500 TABLET, EXTENDED RELEASE ORAL at 16:24

## 2022-09-16 RX ADMIN — SODIUM CHLORIDE 10 ML/HR: 0.9 INJECTION, SOLUTION INTRAVENOUS at 10:13

## 2022-09-16 RX ADMIN — TORSEMIDE 60 MG: 20 TABLET ORAL at 10:13

## 2022-09-16 RX ADMIN — MAGNESIUM SULFATE HEPTAHYDRATE 2 G: 40 INJECTION, SOLUTION INTRAVENOUS at 00:55

## 2022-09-16 RX ADMIN — CLOTRIMAZOLE: 0.01 CREAM TOPICAL at 08:04

## 2022-09-16 RX ADMIN — Medication 40 MG/HR: at 05:37

## 2022-09-16 RX ADMIN — CHOLESTYRAMINE 4 G: 4 POWDER, FOR SUSPENSION ORAL at 18:01

## 2022-09-16 RX ADMIN — MIDODRINE HYDROCHLORIDE 2.5 MG: 5 TABLET ORAL at 10:50

## 2022-09-16 RX ADMIN — POTASSIUM CHLORIDE 20 MEQ: 14.9 INJECTION, SOLUTION INTRAVENOUS at 16:24

## 2022-09-16 RX ADMIN — ALLOPURINOL 300 MG: 300 TABLET ORAL at 08:04

## 2022-09-16 RX ADMIN — METOLAZONE 10 MG: 2.5 TABLET ORAL at 05:38

## 2022-09-16 RX ADMIN — LOPERAMIDE HYDROCHLORIDE 2 MG: 2 CAPSULE ORAL at 16:27

## 2022-09-16 RX ADMIN — FLUTICASONE FUROATE AND VILANTEROL TRIFENATATE 1 PUFF: 200; 25 POWDER RESPIRATORY (INHALATION) at 07:53

## 2022-09-16 RX ADMIN — ATORVASTATIN CALCIUM 40 MG: 40 TABLET, FILM COATED ORAL at 18:01

## 2022-09-16 RX ADMIN — PIPERACILLIN AND TAZOBACTAM 3.38 G: 3; .375 INJECTION, POWDER, LYOPHILIZED, FOR SOLUTION INTRAVENOUS at 14:43

## 2022-09-16 RX ADMIN — POTASSIUM CHLORIDE 20 MEQ: 14.9 INJECTION, SOLUTION INTRAVENOUS at 12:17

## 2022-09-16 RX ADMIN — PANTOPRAZOLE SODIUM 40 MG: 40 TABLET, DELAYED RELEASE ORAL at 05:38

## 2022-09-16 RX ADMIN — POTASSIUM CHLORIDE 20 MEQ: 14.9 INJECTION, SOLUTION INTRAVENOUS at 18:00

## 2022-09-16 RX ADMIN — METOLAZONE 10 MG: 2.5 TABLET ORAL at 00:55

## 2022-09-16 RX ADMIN — CHOLESTYRAMINE 4 G: 4 POWDER, FOR SUSPENSION ORAL at 10:13

## 2022-09-16 RX ADMIN — Medication 6 MG: at 21:32

## 2022-09-16 RX ADMIN — CLOTRIMAZOLE: 0.01 CREAM TOPICAL at 18:03

## 2022-09-16 RX ADMIN — POTASSIUM CHLORIDE 20 MEQ: 14.9 INJECTION, SOLUTION INTRAVENOUS at 10:49

## 2022-09-16 RX ADMIN — HEPARIN SODIUM 5.1 UNITS/KG/HR: 10000 INJECTION, SOLUTION INTRAVENOUS at 23:36

## 2022-09-16 RX ADMIN — INSULIN GLARGINE 10 UNITS: 100 INJECTION, SOLUTION SUBCUTANEOUS at 21:32

## 2022-09-16 RX ADMIN — POTASSIUM CHLORIDE 20 MEQ: 14.9 INJECTION, SOLUTION INTRAVENOUS at 00:56

## 2022-09-16 RX ADMIN — PIPERACILLIN AND TAZOBACTAM 3.38 G: 3; .375 INJECTION, POWDER, LYOPHILIZED, FOR SOLUTION INTRAVENOUS at 02:49

## 2022-09-16 RX ADMIN — MIDODRINE HYDROCHLORIDE 2.5 MG: 5 TABLET ORAL at 06:11

## 2022-09-16 RX ADMIN — POTASSIUM CHLORIDE 40 MEQ: 20 TABLET, EXTENDED RELEASE ORAL at 07:53

## 2022-09-16 RX ADMIN — INSULIN LISPRO 1 UNITS: 100 INJECTION, SOLUTION INTRAVENOUS; SUBCUTANEOUS at 00:55

## 2022-09-16 NOTE — PROGRESS NOTES
Progress Note - Palliative & Supportive Care  Airam Espinosa  81 y o   male  /-01   MRN: [id number]  Encounter: [id number]     Assessment/Problems Actively Addressed this Visit:  Acute renal failure on CKD  Metabolic acidosis  Diverticulitis  Pneumobilia, rule out cholecystitis  Multiple myeloma - not having achieved remission   Morbid obesity  COPD  NALLELY  DMII    Goals/Recommendations:  · Level 1 code status - ongoing discussions   · 9/16 - I met with Kendall Rubalcava at bedside; he is currently undergoing HD via R IJ cath  · We reviewed his goals of care and there are no updates at this time  · Plan to keep son [facility]) updated re: medical developments  · See initial consultation from 9/13/22  · Palliative will follow on Mon 9/19/22 if patient still admitted  Plan:  Symptom Management    Will defer symptom management to primary team      24 History  Chart reviewed before visit  Kendall Rubalcava received PermCath placement in is undergoing dialysis 09/16/2022  I spoke with him at bedside while he is receiving treatment  He expresses that he understands the medical indication for dialysis  We discussed the hope that his kidneys will improve however that we are not certain this will happen  He is receptive to further goals of care discussions pending his clinical course  He denies symptoms such as pain, anxiety, or nausea  Decisional apparatus:  Patient is competent on exam today  If competence is lost, patient's substitute decision maker would default to yokasta Sneed by [place] Act 169  Review of Systems:   Review of Systems   Constitutional: Negative for decreased appetite, malaise/fatigue and night sweats  HENT: Negative for congestion and hearing loss  Eyes: Negative for blurred vision and redness  Respiratory: Negative for cough, shortness of breath and sleep disturbances due to breathing  Skin: Negative for dry skin, flushing and poor wound healing     Musculoskeletal: Negative for back pain and falls  Gastrointestinal: Negative for bloating, change in bowel habit, nausea and vomiting  Neurological: Positive for weakness  Negative for difficulty with concentration, disturbances in coordination, excessive daytime sleepiness and dizziness  Psychiatric/Behavioral: Negative for altered mental status and memory loss  The patient does not have insomnia and is not nervous/anxious          Medications    Current Facility-Administered Medications:     acetaminophen (TYLENOL) tablet 650 mg, 650 mg, Oral, Q6H PRN, Tania Mojica PA-C    albuterol (PROVENTIL HFA,VENTOLIN HFA) inhaler 2 puff, 2 puff, Inhalation, Q6H PRN, Julia Parr PA-C    allopurinol (ZYLOPRIM) tablet 300 mg, 300 mg, Oral, Daily, Julia Parr PA-C, 300 mg at 09/16/22 0804    atorvastatin (LIPITOR) tablet 40 mg, 40 mg, Oral, After Dinner, Julia Parr PA-C, 40 mg at 09/15/22 1817    cholestyramine sugar free (QUESTRAN LIGHT) packet 4 g, 4 g, Oral, BID, Tania Mojica PA-C, 4 g at 09/16/22 1013    clotrimazole (LOTRIMIN) 1 % cream, , Topical, BID, Julia Parr PA-C, Given at 09/16/22 0804    fluticasone-vilanterol (BREO ELLIPTA) 200-25 MCG/INH inhaler 1 puff, 1 puff, Inhalation, Daily, Julia Parr PA-C, 1 puff at 09/16/22 0753    heparin (porcine) 25,000 units in 0 45% NaCl 250 mL infusion (premix), 3-20 Units/kg/hr (Order-Specific), Intravenous, Titrated, NICOLA Calle, Last Rate: 4 6 mL/hr at 09/15/22 0601, 5 1 Units/kg/hr at 09/15/22 0601    heparin (porcine) injection 2,000 Units, 2,000 Units, Intravenous, Q1H PRN, Lovetta [place], CRNP    heparin (porcine) injection 4,000 Units, 4,000 Units, Intravenous, Q1H PRN, Lovetta [place], CRNP    insulin glargine (LANTUS) subcutaneous injection 10 Units 0 1 mL, 10 Units, Subcutaneous, HS, Julia Parr PA-C, 10 Units at 09/15/22 0292    insulin lispro (HumaLOG) 100 units/mL subcutaneous injection 1-6 Units, 1-6 Units, Subcutaneous, Q6H [address], 1 Units at 09/16/22 0055 **AND** Fingerstick Glucose (POCT), , , Q6H, Julia Parr PA-C    melatonin tablet 6 mg, 6 mg, Oral, HS, Julia Parr PA-C, 6 mg at 09/15/22 2227    midodrine (PROAMATINE) tablet 2 5 mg, 2 5 mg, Oral, TID AC, Isela Valles PA-C, 2 5 mg at 09/16/22 5738    pantoprazole (PROTONIX) EC tablet 40 mg, 40 mg, Oral, Early Morning, Julia Parr PA-C, 40 mg at 09/16/22 0538    piperacillin-tazobactam (ZOSYN) 3 375 g in sodium chloride 0 9 % 100 mL IVPB (EXTENDED-INFUSION), 3 375 g, Intravenous, Q12H, Julia Parr PA-C, Last Rate: 25 mL/hr at 09/16/22 0249, 3 375 g at 09/16/22 0249    sodium chloride 0 9 % infusion, 10 mL/hr, Intravenous, Continuous, Isela Valles PA-C, Last Rate: 10 mL/hr at 09/16/22 1013, 10 mL/hr at 09/16/22 1013    torsemide (DEMADEX) tablet 60 mg, 60 mg, Oral, Daily, Vigil Cheese, CRNP, 60 mg at 09/16/22 1013    Objective  /56   Pulse (!) 54   Temp 97 9 °F (36 6 °C) (Temporal)   Resp 21   Ht 6' 3" (1 905 m)   Wt 119 kg (262 lb 5 6 oz)   SpO2 100%   BMI 32 79 kg/m²     Physical Exam:   Physical Exam  Vitals and nursing note reviewed  Constitutional:       Appearance: He is well-developed  He is ill-appearing  HENT:      Head: Normocephalic and atraumatic  Eyes:      General:         Right eye: No discharge  Left eye: No discharge  Conjunctiva/sclera: Conjunctivae normal    Pulmonary:      Effort: Pulmonary effort is normal  No respiratory distress  Abdominal:      Tenderness: There is no abdominal tenderness  Musculoskeletal:      Cervical back: Neck supple  Skin:     General: Skin is warm and dry  Coloration: Skin is pale  Neurological:      Mental Status: He is alert and oriented to person, place, and time  Mental status is at baseline  Psychiatric:         Mood and Affect: Mood normal          Behavior: Behavior normal        Lab Results:    I have personally reviewed pertinent labs  , CBC:   Lab Results   Component Value Date    WBC 5 82 09/16/2022    HGB 7 6 (L) 09/16/2022    HCT 23 6 (L) 09/16/2022    MCV 82 09/16/2022     09/16/2022    MCH 26 4 (L) 09/16/2022    MCHC 32 2 09/16/2022    RDW 19 0 (H) 09/16/2022    MPV 10 6 09/16/2022   , CMP:   Lab Results   Component Value Date    SODIUM 139 09/16/2022    K 3 4 (L) 09/16/2022     09/16/2022    CO2 20 (L) 09/16/2022    BUN 91 (H) 09/16/2022    CREATININE 9 13 (H) 09/16/2022    CALCIUM 8 4 09/16/2022    EGFR 5 09/16/2022     Imaging Studies: I have personally reviewed pertinent reports  EKG, Pathology, and Other Studies: I have personally reviewed pertinent reports  Counseling / Coordination of Care  Total floor / unit time spent today 30 minutes  Greater than 50% of total time was spent with the patient and / or family counseling and / or coordinating of care  A description of the counseling / coordination of care: Chart reviewed,  provided medical updates, determined goals of care, discussed palliative care and symptom management, discussed code status, discussed comfort care and hospice, determined competency and POA/HCA, determined social/family support, provided psychosocial support  Reviewed with RN and CM  Jabier Lesches, MFA-CWP  Palliative & Supportive Care    Portions of this document may have been created using dictation software and as such some "sound alike" terms may have been generated by the system  Do not hesitate to contact me with any questions or clarifications

## 2022-09-16 NOTE — ASSESSMENT & PLAN NOTE
Lab Results   Component Value Date    EGFR 5 09/16/2022    EGFR 5 09/15/2022    EGFR 5 09/15/2022    CREATININE 8 94 (H) 09/16/2022    CREATININE 8 92 (H) 09/15/2022    CREATININE 8 71 (H) 09/15/2022     · Acute on chronic kidney disease creatinine is 8 31 continue to monitor  · Bicarb drip at 125 an hour also Lasix drip at 30 ml/hr and Zaroxolyn 10 q 6  · Q 6 hours labs

## 2022-09-16 NOTE — PROGRESS NOTES
Tanesha Rosario  [id number]    81 y o   male      ASSESSMENT and PLAN    Abdominal pain/acute sigmoid diverticulitis - no peritoneal signs but pain persists  · Continue clears  · Continue IV antibiotics    Diarrhea - C diff and culture negative  Suspect secondary to antibiotics  Cholestyramine added  Chief Complaint   Patient presents with    Abnormal Lab     Patient arrives via EMS from [facility] for an elevated creatinine of 7 3  Reports yellow emesis for a few days  Patient has RUQ and RLQ abdominal pain, is currently getting chemo once a week for kidney cancer  SUBJECTIVE/HPI still with abdominal pain and loose stools  No melena or hematochezia  No nausea or vomiting  Tolerating liquid diet otherwise      /62 (BP Location: Left arm)   Pulse 56   Temp (!) 97 1 °F (36 2 °C) (Temporal)   Resp (!) 27   Ht 6' 3" (1 905 m)   Wt 119 kg (262 lb 5 6 oz)   SpO2 100%   BMI 32 79 kg/m²     PHYSICALEXAM  General appearance: alert, appears stated age and cooperative  Head: Normocephalic, without obvious abnormality, atraumatic  Lungs: clear to auscultation bilaterally  Heart: regular rate and rhythm, S1, S2 normal, no murmur, click, rub or gallop  Abdomen: soft, mild diffuse tenderness without rebound or guarding; bowel sounds normal; protuberant but no masses,  no organomegaly  Extremities: extremities normal, atraumatic, no cyanosis or edema  Neurologic: Grossly normal    Lab Results   Component Value Date    GLUCOSE 64 (L) 08/01/2022    CALCIUM 8 1 (L) 09/16/2022     01/15/2018    K 3 3 (L) 09/16/2022    CO2 24 09/16/2022     09/16/2022    BUN 63 (H) 09/16/2022    CREATININE 6 45 (H) 09/16/2022     Lab Results   Component Value Date    WBC 5 82 09/16/2022    HGB 7 6 (L) 09/16/2022    HCT 23 6 (L) 09/16/2022    MCV 82 09/16/2022     09/16/2022     Lab Results   Component Value Date    ALT 14 09/14/2022    AST 14 09/14/2022     (H) 11/03/2019    ALKPHOS 92 09/14/2022    BILITOT 0 6 01/15/2018     No results found for: AMYLASE  Lab Results   Component Value Date    LIPASE 691 (H) 09/13/2022     Lab Results   Component Value Date    IRON 17 (L) 08/04/2022    TIBC 206 (L) 08/04/2022    FERRITIN 165 08/04/2022     Lab Results   Component Value Date    INR 2 54 (H) 09/16/2022       Counseling / Coordination of Care  Total floor / unit time spent today 20 minutes

## 2022-09-16 NOTE — ASSESSMENT & PLAN NOTE
Lab Results   Component Value Date    HGBA1C 6 3 (H) 08/05/2022       Recent Labs     09/15/22  1240 09/15/22  1618 09/15/22  2226 09/16/22  0054   POCGLU 152* 109 200* 162*       Blood Sugar Average: Last 72 hrs:  (P) 437 [id number] patient is NPO at this time  Continue insulin sliding scale   Continue  bedtime insulin 10 units  Hold Victoza  If needed start insulin drip

## 2022-09-16 NOTE — PROCEDURES
HEMODIALYSIS PROCEDURE NOTE  The patient was seen and examined on initial hemodialysis start of treatment  Time: 2 hours  Sodium: 138 Blood flow: 200 ml/min   Dialyzer: F160 Potassium: 4 Dialysate flow: 300 ml/min   Access: R IJ temp HD catheter Bicarbonate: 35 Ultrafiltration goal: Even   Medications on HD: None     I was present at start of HD, discussed with patient bedside, tolerated well, BP stable, discussed with HD nurse

## 2022-09-16 NOTE — QUICK NOTE
Patient seen and evaluated by Critical Care today and deemed to be appropriate for transfer to Stepdown Level 2  Spoke to Dr Kumar Gonzalez from [facility] to accept patient transfer  Major changes to treatment plan from the day of transfer include first session of HD today with plans for repeat HD session tomorrow  Continue Zosyn for diverticulitis, started cholestyramine for persistent diarrhea    Critical care can be contacted via [employer] with any questions or concerns

## 2022-09-16 NOTE — PROGRESS NOTES
NEPHROLOGY PROGRESS NOTE   Loan Brand 81 y o  male MRN: [id number]  Unit/Bed#: -01 Encounter: [id number]      HPI/24hr EVENTS:    -70-year-old male past medical history of multiple myeloma currently being treated with Velcade and Decadron, CKD 3 with recent DORA/non HD with creatinine of 2 9 at discharge, heart failure with reduced ejection fraction, hypertension, morbid obesity   Presented with abnormal outpatient lab work/DORA, reports multiple days of diarrhea and decreased oral intake, presentation similar to recent in August    -continued on Lasix infusion with metolazone, no change in GFR, 500 mL urine output over the past 24 hours, continued diarrhea overnight requiring placement of fecal management system    ASSESSMENT/PLAN:    DORA (POA)  -Baseline creatinine: prior to 10/2021 was 1 2 - 1 4, had has recurrent episodes of non HD DORA, more recently baseline was 1 8-2 2, recent hospitalization in early August with DORA with a serum creatinine of 5 on presentation which improved to 2 96 at time of discharge 8/10  -Creatinine on admission 8 44, most recent creatinine 9 11 (GFR remains between 5 and 6)  -Etiology:  Suspect prerenal azotemia 2nd to severe diarrhea with reduced oral intake, sepsis due to diverticulitis, possible conversion to ATN  -UA:  Mobile, negative blood/protein  -Renal imaging:  CT abdomen pelvis without contrast on admission-nonobstructing left-sided punctate intrarenal calculi, left-sided, Renal cyst is seen no mention of hydronephrosis  -Avoid hypotension, avoid nephrotoxins, avoid NSAIDS  -Trend BMP  -now off IV fluid  -strict I&Os, daily urine output 610 mL and 503 mL/24 hours  on Lasix infusion  -ongoing oliguria despite IV fluid resuscitation, was given Lasix bolus and started on Lasix infusion currently on 40 milligrams/hour with metolazone 10 mg q 6 hours  -can stop Lasix gtt and metolazone in favor of torsemide 60 mg for now  -lengthy discussion bedside regarding possible requirements of renal replacement therapy in the coming hours to day's depending on patient's response to current treatments   This included risks and benefits associated with dialysis including infection, arrhythmia, sudden death   Reviewed modalities of dialysis/renal replacement therapy pre and indications for renal replacement therapy with patient   Patient consented for dialysis, placed in chart   -temp HD catheter placed 9/15  -plan for dialysis today given oliguria, no improvement in GFR, ongoing acidosis, repeat treatment tomorrow     CKD IIIb  -Etiology:  Suspected diabetic nephropathy, hypertensive nephrosclerosis, cardiorenal syndrome, possible underlying myeloma  -Follows with Dr Christina Skelton OP, last seen 05/4880     Metabolic acidosis  -bicarb 10 admission with anion gap 20, chloride 109, lactic 1 2  -likely 2nd to DORA and GI loss of bicarbonate via diarrhea  -normalized with bicarb infusion which was recently discontinued most recent bicarb 20  -continue trend BMP, plan for HD tomorrow     Hypokalemia   -was initially hyperkalemic on admission at 5 4,   -most recently 3 6  -likely due to diuretics/bicarb infusion  -continue to trend BMP, for HD today     Anemia  -hemoglobin 10 3 on admission, now 7 6  after IV fluid resuscitation  -in the setting of known multiple myeloma     Heart failure with reduced ejection fraction  -10/21 TTE:  EF 45%, mild mitral valve regurgitation    -outpatient diuretic torsemide 40 mg b i d , currently on hold in favor Lasix infusion  -weight on admission via bed scale 115 kg,  most recent bed scale weight 119 kg  -reported recent 20 lb weight loss as outpatient in the setting of poor oral intake and diarrhea  -daily weights monitor intake and output  -currently on room air     Multiple myeloma  -under recent bone marrow biopsy 9/8  -follows with Dr Kemal Francis from Hematology-Oncology  -currently on Velcade and Decadron per Heme-Onc, last treatment 9/6     Hypertension  -only outpatient medication is torsemide  - recent blood pressure is acceptable, started on midodrine 2 5 mg t i d  For low/normal blood pressures     Ampulary adenoma  -s/p ERCP with endoscopic mucosal resection s/p PD and CBD stent placement by GI in 7/2022  -Ct scan with CBD stent and pneumobilia and air within gallbladdder  -Surgery consulted/GI consulted     Acute Diverticulitis   -CT AP with acute sigmoid diverticulitis without drainable collection  -On zosyn per primary team  -GI and surgery consulted  -Care per primary team     Hyperphosphatemia  -in the setting of acute renal failure, phosphorus 6 1,  -trend, recommend renal diet, might require binders      Hypomagnesemia  -magnesium 1 1 on admission most recently 2 7  -etiology likely secondary to acute GI loss, trend     Atrial fibrillation  -on Coumadin as outpatient, currently held in favor of heparin infusion     Advanced care planning  -reviewed palliative care meeting notes      Addition medical problems:  Morbid obesity, dm 2 last A1c 6 3     Discussed with critical care AP    SUBJECTIVE:  Patient reports he did not sleep well overnight, reports he has had ongoing diarrhea and abdominal pain but denies any other complaints    ROS:  Review of Systems   Constitutional: Positive for fatigue  Respiratory: Negative  Cardiovascular: Negative  Gastrointestinal: Positive for abdominal pain and diarrhea  Genitourinary: Negative  Neurological: Negative  All other systems reviewed and are negative         OBJECTIVE:  Current Weight: Weight - Scale: 119 kg (262 lb 5 6 oz)  Vitals:    09/15/22 1900 09/15/22 2251 09/16/22 0329 09/16/22 0540   BP: 121/61 104/54 114/58    BP Location:  Left arm Left arm    Pulse: (!) 48 59 (!) 48    Resp: 21 18 (!) 26    Temp:  98 2 °F (36 8 °C) 98 6 °F (37 °C)    TempSrc:  Oral Oral    SpO2: 98% 99% 100%    Weight:    119 kg (262 lb 5 6 oz)   Height:           Intake/Output Summary (Last 24 hours) at 9/16/2022 0901  Last data filed at 9/16/2022 0600  Gross per 24 hour   Intake 2069 13 ml   Output 1450 ml   Net 619 13 ml     Physical Exam  Vitals and nursing note reviewed  Constitutional:       General: He is not in acute distress  Appearance: Normal appearance  He is obese  He is not toxic-appearing or diaphoretic  Comments: Awake sitting in bed   HENT:      Head: Normocephalic and atraumatic  Nose: Nose normal       Mouth/Throat:      Mouth: Mucous membranes are moist    Eyes:      General: No scleral icterus  Neck:      Comments: Right IJ temp HD catheter with clean dressing  Cardiovascular:      Rate and Rhythm: Normal rate and regular rhythm  Pulses: Normal pulses  Pulmonary:      Effort: Pulmonary effort is normal  No respiratory distress  Breath sounds: Normal breath sounds  No stridor  No wheezing or rales  Abdominal:      General: Abdomen is flat  There is no distension  Palpations: Abdomen is soft  Tenderness: There is no abdominal tenderness  Musculoskeletal:      Cervical back: Neck supple  Comments: Left AKA  Trace right lower extremity edema   Skin:     General: Skin is warm and dry  Capillary Refill: Capillary refill takes less than 2 seconds  Neurological:      General: No focal deficit present  Mental Status: He is alert            Medications:    Current Facility-Administered Medications:     acetaminophen (TYLENOL) tablet 650 mg, 650 mg, Oral, Q6H PRN, Ed JOEY Oliva    albuterol (PROVENTIL HFA,VENTOLIN HFA) inhaler 2 puff, 2 puff, Inhalation, Q6H PRN, Julia Parr PA-C    allopurinol (ZYLOPRIM) tablet 300 mg, 300 mg, Oral, Daily, Julia Parr PA-C, 300 mg at 09/16/22 0804    atorvastatin (LIPITOR) tablet 40 mg, 40 mg, Oral, After Dinner, Julia Parr PA-C, 40 mg at 09/15/22 1817    clotrimazole (LOTRIMIN) 1 % cream, , Topical, BID, Julia Parr PA-C, Given at 09/16/22 0804    fluticasone-vilanterol (BREO ELLIPTA) 200-25 MCG/INH inhaler 1 puff, 1 puff, Inhalation, Daily, Julia Parr PA-C, 1 puff at 09/16/22 0753    furosemide (LASIX) 500 mg infusion 50 mL, 40 mg/hr, Intravenous, Continuous, Geraline JOEY Vargas, Last Rate: 4 mL/hr at 09/16/22 0537, 40 mg/hr at 09/16/22 0537    heparin (porcine) 25,000 units in 0 45% NaCl 250 mL infusion (premix), 3-20 Units/kg/hr (Order-Specific), Intravenous, Titrated, NICOLA Calle, Last Rate: 4 6 mL/hr at 09/15/22 0601, 5 1 Units/kg/hr at 09/15/22 0601    heparin (porcine) injection 2,000 Units, 2,000 Units, Intravenous, Q1H PRN, Kimberly MorganAZULNP    heparin (porcine) injection 4,000 Units, 4,000 Units, Intravenous, Q1H PRN, Kimberly GillianNICOLA montgomery    insulin glargine (LANTUS) subcutaneous injection 10 Units 0 1 mL, 10 Units, Subcutaneous, HS, Julia Parr PA-C, 10 Units at 09/15/22 2227    insulin lispro (HumaLOG) 100 units/mL subcutaneous injection 1-6 Units, 1-6 Units, Subcutaneous, Q6H [facility], 1 Units at 09/16/22 0055 **AND** Fingerstick Glucose (POCT), , , Q6H, Julia Parr PA-C    melatonin tablet 6 mg, 6 mg, Oral, HS, Julia Parr PA-C, 6 mg at 09/15/22 2227    metolazone (ZAROXOLYN) tablet 10 mg, 10 mg, Oral, Q6H, NICOLA Calle, 10 mg at 09/16/22 0538    midodrine (PROAMATINE) tablet 2 5 mg, 2 5 mg, Oral, TID AC, Geraline JOEY Vargas, 2 5 mg at 09/16/22 0975    pantoprazole (PROTONIX) EC tablet 40 mg, 40 mg, Oral, Early Morning, Julia Parr PA-C, 40 mg at 09/16/22 0538    piperacillin-tazobactam (ZOSYN) 3 375 g in sodium chloride 0 9 % 100 mL IVPB (EXTENDED-INFUSION), 3 375 g, Intravenous, Q12H, Julia Parr PA-C, Last Rate: 25 mL/hr at 09/16/22 0249, 3 375 g at 09/16/22 0249    Laboratory Results:  Results from last 7 days   Lab Units 09/16/22  0501 09/16/22  0056 09/15/22  2021 09/15/22  1631 09/15/22  1241 09/15/22  0805 09/15/22  0529 09/14/22  1611 09/14/22  1202 09/14/22  0523 09/13/22  2322 09/13/22  1708 09/13/22  1156 09/13/22  0532 09/13/22  0107 09/12/22  2157 09/12/22 1958 09/12/22  1658   WBC Thousand/uL 5 82  --   --   --   --   --  5 77  --  7 10 6 41  --   --   --  7 02  --   --   --  10 20*   HEMOGLOBIN g/dL 7 6*  --   --   --   --  7 1* 6 8*  --  7 8* 7 0*  --   --   --  8 3*  --   --   --  10 3*   HEMATOCRIT % 23 6*  --   --   --   --   --  20 9*  --  23 7* 21 0*  --   --   --  26 2*  --   --   --  32 6*   PLATELETS Thousands/uL 166  --   --   --   --   --  141*  --  135* 123*  --   --   --  124*  --   --   --  130*   POTASSIUM mmol/L 3 6 3 7 3 3* 3 2* 3 3* 3 4* 3 6   < > 3 5 3 2* 3 7 4 0 4 5 4 9   < > 5 4*   < > 5 4*   CHLORIDE mmol/L 102 101 99 101 100 101 101   < > 102 105 106 107 109* 110*   < > 111*   < > 109*   CO2 mmol/L 20* 21 21 23 22 24 24   < > 20* 18* 17* 17* 17* 12*   < > 12*   < > 10*   BUN mg/dL 98* 97* 95* 97* 96* 97* 100*   < > 101* 103* 103* 98* 102* 101*   < > 104*   < > 103*   CREATININE mg/dL 9 11* 8 94* 8 92* 8 71* 8 78* 8 44* 8 45*   < > 8 26* 8 06* 8 17* 8 14* 8 20* 8 21*   < > 8 32*   < > 8 44*   CALCIUM mg/dL 8 4 8 3 8 1* 8 3 7 9* 7 8* 8 3   < > 8 1* 7 7* 7 6* 7 8* 7 9* 8 1*   < > 8 4   < > 8 5   MAGNESIUM mg/dL 2 7* 2 2 2 0 2 2 2 0 2 1 2 5   < > 2 2 1 9 1 5* 1 6 1 6 1 6  --  1 1*   < >  --    PHOSPHORUS mg/dL  --   --   --   --   --   --   --   --   --  6 1* 5 6* 5 4* 5 4* 5 4*  --  5 4*  --   --     < > = values in this interval not displayed  I have personally reviewed the blood work as stated above and in my note  I have personally reviewed critical care note

## 2022-09-16 NOTE — ASSESSMENT & PLAN NOTE
Wt Readings from Last 3 Encounters:   09/15/22 119 kg (262 lb 9 1 oz)   09/06/22 117 kg (257 lb 15 oz)   08/30/22 118 kg (260 lb)     Patient has an EF of 40-45% on last admission was volume overloaded and received Lasix  Patient is on torsemide at baseline- on hold  Patient is on a Lasix drip and Zaroxolyn  Starting on HD today

## 2022-09-16 NOTE — PLAN OF CARE
Problem: MOBILITY - ADULT  Goal: Maintain or return to baseline ADL function  Description: INTERVENTIONS:  -  Assess patient's ability to carry out ADLs; assess patient's baseline for ADL function and identify physical deficits which impact ability to perform ADLs (bathing, care of mouth/teeth, toileting, grooming, dressing, etc )  - Assess/evaluate cause of self-care deficits   - Assess range of motion  - Assess patient's mobility; develop plan if impaired  - Assess patient's need for assistive devices and provide as appropriate  - Encourage maximum independence but intervene and supervise when necessary  - Involve family in performance of ADLs  - Assess for home care needs following discharge   - Consider OT consult to assist with ADL evaluation and planning for discharge  - Provide patient education as appropriate  Outcome: Progressing  Goal: Maintains/Returns to pre admission functional level    Problem: GASTROINTESTINAL - ADULT  Goal: Minimal or absence of nausea and/or vomiting  Description: INTERVENTIONS:  - Administer IV fluids if ordered to ensure adequate hydration  - Maintain NPO status until nausea and vomiting are resolved  - Nasogastric tube if ordered  - Administer ordered antiemetic medications as needed  - Provide nonpharmacologic comfort measures as appropriate  - Advance diet as tolerated, if ordered  - Consider nutrition services referral to assist patient with adequate nutrition and appropriate food choices  Outcome: Progressing  Goal: Maintains or returns to baseline bowel function  Description: INTERVENTIONS:  - Assess bowel function  - Encourage oral fluids to ensure adequate hydration  - Administer IV fluids if ordered to ensure adequate hydration  - Administer ordered medications as needed  - Encourage mobilization and activity  - Consider nutritional services referral to assist patient with adequate nutrition and appropriate food choices  Outcome: Progressing  Goal: Maintains adequate nutritional intake  Description: INTERVENTIONS:  - Monitor percentage of each meal consumed  - Identify factors contributing to decreased intake, treat as appropriate  - Assist with meals as needed  - Monitor I&O, weight, and lab values if indicated  - Obtain nutrition services referral as needed  Outcome: Progressing     Problem: GENITOURINARY - ADULT  Goal: Maintains or returns to baseline urinary function  Description: INTERVENTIONS:  - Assess urinary function  - Encourage oral fluids to ensure adequate hydration if ordered  - Administer IV fluids as ordered to ensure adequate hydration  - Administer ordered medications as needed  - Offer frequent toileting  - Follow urinary retention protocol if ordered  Outcome: Progressing  Goal: Absence of urinary retention  Description: INTERVENTIONS:  - Assess patients ability to void and empty bladder  - Monitor I/O  - Bladder scan as needed  - Discuss with physician/AP medications to alleviate retention as needed  - Discuss catheterization for long term situations as appropriate  Outcome: Progressing  Goal: Urinary catheter remains patent  Description: INTERVENTIONS:  - Assess patency of urinary catheter  - If patient has a chronic mehta, consider changing catheter if non-functioning  - Follow guidelines for intermittent irrigation of non-functioning urinary catheter  Outcome: Progressing

## 2022-09-16 NOTE — ASSESSMENT & PLAN NOTE
· Patient has acute kidney injury with a creatinine of 8 1 and a metabolic acidosis most likely related to renal failure  · Nephrology is following   · Hyperkalemia was treated with dextrose bicarb calcium and insulin last result 4 8   · Last admission patient arrived with for creatinine of 5 97 which resolved to 2 96  · Q 6 hours labs  · Bicarb 125 ml / hour   · Started on with Zaroxolyn 10 mg q 6 and Lasix infusion 40 milligrams/hour  · Urine output ranges between 30-40 ml per hour  · Dialysis catheter placed by IR to have HD today

## 2022-09-16 NOTE — PLAN OF CARE
Target UF goal net even as tolerated  Patient dialyzing for 2 hours on 4K bath for serum K of 3 6 per protocol  Post-Dialysis RN Treatment Note    Blood Pressure:  Pre 132/61 mm/Hg  Post 125/62 mmHg   EDW  TBD    Weight:  Pre 118 6 kg   Post 118 1 kg   Mode of weight measurement: Bed Scale   Volume Removed  0 ml    Treatment duration 2 hours    NS given  No    Treatment shortened?  No   Medications given during Rx None Reported   Estimated Kt/V  Not Applicable   Access type: Temporary HD catheter   Access Issues: No    Report called to primary nurse   Yes,  Jackson Kruger RN       Problem: METABOLIC, FLUID AND ELECTROLYTES - ADULT  Goal: Electrolytes maintained within normal limits  Description: INTERVENTIONS:  - Monitor labs and assess patient for signs and symptoms of electrolyte imbalances  - Administer electrolyte replacement as ordered  - Monitor response to electrolyte replacements, including repeat lab results as appropriate  - Instruct patient on fluid and nutrition as appropriate  Outcome: Progressing  Goal: Fluid balance maintained  Description: INTERVENTIONS:  - Monitor labs   - Monitor I/O and WT  - Instruct patient on fluid and nutrition as appropriate  - Assess for signs & symptoms of volume excess or deficit  Outcome: Progressing

## 2022-09-16 NOTE — ASSESSMENT & PLAN NOTE
Patient is a 68-year-old male with an extensive medical history recently admitted from 08/1 to 8/10 for acute diverticulitis was on Rocephin and Flagyl, also had an ERCP and a common bile duct stent placed  Who comes in today again with nausea vomiting and diarrhea for several days  · CT of the abdomen and pelvis shows acute sigmoid diverticulitis without drainable fluid collection compared also with to studies on 08/01 it is decreased, also shows common bile duct stent with normal mood bili and air within the gallbladder, distended gallbladder with cholelithiasis concern for acute cholecystitis also thickened and under distended urinary bladder possible cystitis  · Started on Zosyn 3 375 g IV Q 6 hours  · GI and General surgery consult for further evaluation of cholecystitis  · Continue to monitor end points   · C diff check - negative   · Ultrasound right upper quadrant - Distended gallbladder with air with stent in the CBD similar to CT  This could be secondary to the stent, lab correlation for ascending infection    · Patient has fecal management system continues to have diarrhea

## 2022-09-16 NOTE — ASSESSMENT & PLAN NOTE
Lab Results   Component Value Date    EGFR 5 09/16/2022    EGFR 5 09/15/2022    EGFR 5 09/15/2022    CREATININE 8 94 (H) 09/16/2022    CREATININE 8 92 (H) 09/15/2022    CREATININE 8 71 (H) 09/15/2022     Patient had outpatient labs done which showed a creatinine of 7 0 on last admission creatinine was 5 76 which improved to around 2 96 on discharge  Creatinine now is 8 44 most likely related again due to dehydration and diarrhea  Nephrology is consulted  Discussion about dialysis may need to be made  Patient still makes urine  Due to severe metabolic acidosis started on bicarb drip at 105 an hour  Q 6 hours BMP Mag and phos

## 2022-09-17 ENCOUNTER — APPOINTMENT (INPATIENT)
Dept: DIALYSIS | Facility: HOSPITAL | Age: 72
DRG: 674 | End: 2022-09-17
Payer: COMMERCIAL

## 2022-09-17 PROBLEM — E87.29 INCREASED ANION GAP METABOLIC ACIDOSIS: Status: RESOLVED | Noted: 2022-09-12 | Resolved: 2022-09-17

## 2022-09-17 PROBLEM — R93.89 ABNORMAL CT SCAN: Status: ACTIVE | Noted: 2022-09-12

## 2022-09-17 PROBLEM — L98.499 TRAUMATIC SKIN ULCER (HCC): Status: RESOLVED | Noted: 2021-08-30 | Resolved: 2022-01-01

## 2022-09-17 PROBLEM — N18.9 CKD (CHRONIC KIDNEY DISEASE): Status: RESOLVED | Noted: 2019-11-01 | Resolved: 2022-09-17

## 2022-09-17 PROBLEM — E87.2 INCREASED ANION GAP METABOLIC ACIDOSIS: Status: RESOLVED | Noted: 2022-09-12 | Resolved: 2022-09-17

## 2022-09-17 PROBLEM — I89.0 LYMPHEDEMA: Chronic | Status: RESOLVED | Noted: 2019-12-06 | Resolved: 2022-01-01

## 2022-09-17 PROBLEM — I21.A1 TYPE 2 ACUTE MYOCARDIAL INFARCTION (HCC): Status: RESOLVED | Noted: 2019-10-29 | Resolved: 2022-01-01

## 2022-09-17 PROBLEM — R26.2 DIFFICULTY IN WALKING, NOT ELSEWHERE CLASSIFIED: Status: RESOLVED | Noted: 2020-04-30 | Resolved: 2022-09-17

## 2022-09-17 PROBLEM — I87.331 VENOUS HYPERTENSION, CHRONIC, WITH ULCER AND INFLAMMATION, RIGHT (HCC): Status: RESOLVED | Noted: 2020-12-23 | Resolved: 2022-09-17

## 2022-09-17 PROBLEM — N18.32 STAGE 3B CHRONIC KIDNEY DISEASE (HCC): Chronic | Status: ACTIVE | Noted: 2022-05-26

## 2022-09-17 PROBLEM — L97.919 VENOUS HYPERTENSION, CHRONIC, WITH ULCER AND INFLAMMATION, RIGHT (HCC): Status: RESOLVED | Noted: 2020-12-23 | Resolved: 2022-09-17

## 2022-09-17 PROBLEM — R53.1 WEAKNESS: Status: RESOLVED | Noted: 2019-10-29 | Resolved: 2022-09-17

## 2022-09-17 PROBLEM — D64.9 ANEMIA: Chronic | Status: ACTIVE | Noted: 2021-10-04

## 2022-09-17 PROBLEM — E11.9 DIABETES MELLITUS, TYPE 2 (HCC): Chronic | Status: ACTIVE | Noted: 2019-07-25

## 2022-09-17 PROBLEM — R26.2 AMBULATORY DYSFUNCTION: Chronic | Status: ACTIVE | Noted: 2021-08-30

## 2022-09-17 PROBLEM — R06.02 SOB (SHORTNESS OF BREATH): Status: RESOLVED | Noted: 2019-05-07 | Resolved: 2022-09-17

## 2022-09-17 PROBLEM — R60.0 LOCALIZED EDEMA: Chronic | Status: RESOLVED | Noted: 2018-04-16 | Resolved: 2022-09-17
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
## 2022-09-17 PROBLEM — J44.9 CHRONIC OBSTRUCTIVE PULMONARY DISEASE, UNSPECIFIED (HCC): Chronic | Status: ACTIVE | Noted: 2019-08-21

## 2022-09-17 PROBLEM — A41.9 SEPSIS (HCC): Status: RESOLVED | Noted: 2020-06-25 | Resolved: 2022-09-17

## 2022-09-17 PROBLEM — R79.1 SUPRATHERAPEUTIC INR: Status: RESOLVED | Noted: 2021-10-04 | Resolved: 2022-09-17

## 2022-09-17 PROBLEM — R21 RASH: Status: RESOLVED | Noted: 2019-12-11 | Resolved: 2022-01-01

## 2022-09-17 PROBLEM — M10.9 GOUT: Chronic | Status: ACTIVE | Noted: 2019-11-06

## 2022-09-17 PROBLEM — I50.42 CHRONIC COMBINED SYSTOLIC AND DIASTOLIC CONGESTIVE HEART FAILURE (HCC): Chronic | Status: ACTIVE | Noted: 2017-10-11

## 2022-09-17 LAB
ANION GAP SERPL CALCULATED.3IONS-SCNC: 10 MMOL/L (ref 4–13)
ANION GAP SERPL CALCULATED.3IONS-SCNC: 15 MMOL/L (ref 4–13)
ANION GAP SERPL CALCULATED.3IONS-SCNC: 16 MMOL/L (ref 4–13)
APTT PPP: 72 SECONDS (ref 23–37)
ATRIAL RATE: 32 BPM
BACTERIA BLD CULT: NORMAL
BACTERIA BLD CULT: NORMAL
BUN SERPL-MCNC: 34 MG/DL (ref 5–25)
BUN SERPL-MCNC: 66 MG/DL (ref 5–25)
BUN SERPL-MCNC: 68 MG/DL (ref 5–25)
CALCIUM SERPL-MCNC: 8 MG/DL (ref 8.3–10.1)
CALCIUM SERPL-MCNC: 8.2 MG/DL (ref 8.3–10.1)
CALCIUM SERPL-MCNC: 8.6 MG/DL (ref 8.3–10.1)
CHLORIDE SERPL-SCNC: 102 MMOL/L (ref 96–108)
CHLORIDE SERPL-SCNC: 103 MMOL/L (ref 96–108)
CHLORIDE SERPL-SCNC: 103 MMOL/L (ref 96–108)
CO2 SERPL-SCNC: 20 MMOL/L (ref 21–32)
CO2 SERPL-SCNC: 21 MMOL/L (ref 21–32)
CO2 SERPL-SCNC: 28 MMOL/L (ref 21–32)
CREAT SERPL-MCNC: 4.22 MG/DL (ref 0.6–1.3)
CREAT SERPL-MCNC: 7.32 MG/DL (ref 0.6–1.3)
CREAT SERPL-MCNC: 7.71 MG/DL (ref 0.6–1.3)
ERYTHROCYTE [DISTWIDTH] IN BLOOD BY AUTOMATED COUNT: 19.1 % (ref 11.6–15.1)
GFR SERPL CREATININE-BSD FRML MDRD: 13 ML/MIN/1.73SQ M
GFR SERPL CREATININE-BSD FRML MDRD: 6 ML/MIN/1.73SQ M
GFR SERPL CREATININE-BSD FRML MDRD: 6 ML/MIN/1.73SQ M
GLUCOSE SERPL-MCNC: 116 MG/DL (ref 65–140)
GLUCOSE SERPL-MCNC: 121 MG/DL (ref 65–140)
GLUCOSE SERPL-MCNC: 149 MG/DL (ref 65–140)
GLUCOSE SERPL-MCNC: 150 MG/DL (ref 65–140)
GLUCOSE SERPL-MCNC: 166 MG/DL (ref 65–140)
GLUCOSE SERPL-MCNC: 70 MG/DL (ref 65–140)
GLUCOSE SERPL-MCNC: 83 MG/DL (ref 65–140)
GLUCOSE SERPL-MCNC: 90 MG/DL (ref 65–140)
HCT VFR BLD AUTO: 23.5 % (ref 36.5–49.3)
HGB BLD-MCNC: 7.5 G/DL (ref 12–17)
IGA SERPL-MCNC: 133 MG/DL (ref 70–400)
IGG SERPL-MCNC: 501 MG/DL (ref 700–1600)
IGM SERPL-MCNC: 31 MG/DL (ref 40–230)
MAGNESIUM SERPL-MCNC: 1.7 MG/DL (ref 1.6–2.6)
MAGNESIUM SERPL-MCNC: 2.1 MG/DL (ref 1.6–2.6)
MAGNESIUM SERPL-MCNC: 2.6 MG/DL (ref 1.6–2.6)
MCH RBC QN AUTO: 26.9 PG (ref 26.8–34.3)
MCHC RBC AUTO-ENTMCNC: 31.9 G/DL (ref 31.4–37.4)
MCV RBC AUTO: 84 FL (ref 82–98)
NT-PROBNP SERPL-MCNC: ABNORMAL PG/ML
PHOSPHATE SERPL-MCNC: 6 MG/DL (ref 2.3–4.1)
PLATELET # BLD AUTO: 189 THOUSANDS/UL (ref 149–390)
PMV BLD AUTO: 11.6 FL (ref 8.9–12.7)
POTASSIUM SERPL-SCNC: 3.4 MMOL/L (ref 3.5–5.3)
POTASSIUM SERPL-SCNC: 3.7 MMOL/L (ref 3.5–5.3)
POTASSIUM SERPL-SCNC: 3.9 MMOL/L (ref 3.5–5.3)
QRS AXIS: 43 DEGREES
QRSD INTERVAL: 130 MS
QT INTERVAL: 618 MS
QTC INTERVAL: 503 MS
RBC # BLD AUTO: 2.79 MILLION/UL (ref 3.88–5.62)
SODIUM SERPL-SCNC: 139 MMOL/L (ref 135–147)
SODIUM SERPL-SCNC: 139 MMOL/L (ref 135–147)
SODIUM SERPL-SCNC: 140 MMOL/L (ref 135–147)
T WAVE AXIS: 75 DEGREES
TSH SERPL DL<=0.05 MIU/L-ACNC: 0.45 UIU/ML (ref 0.45–4.5)
VENTRICULAR RATE: 40 BPM
WBC # BLD AUTO: 5.47 THOUSAND/UL (ref 4.31–10.16)

## 2022-09-17 PROCEDURE — 80048 BASIC METABOLIC PNL TOTAL CA: CPT | Performed by: PHYSICIAN ASSISTANT

## 2022-09-17 PROCEDURE — 83880 ASSAY OF NATRIURETIC PEPTIDE: CPT | Performed by: NURSE PRACTITIONER

## 2022-09-17 PROCEDURE — 84100 ASSAY OF PHOSPHORUS: CPT | Performed by: PHYSICIAN ASSISTANT

## 2022-09-17 PROCEDURE — 83735 ASSAY OF MAGNESIUM: CPT | Performed by: PHYSICIAN ASSISTANT

## 2022-09-17 PROCEDURE — 5A1D70Z PERFORMANCE OF URINARY FILTRATION, INTERMITTENT, LESS THAN 6 HOURS PER DAY: ICD-10-PCS | Performed by: INTERNAL MEDICINE

## 2022-09-17 PROCEDURE — 82948 REAGENT STRIP/BLOOD GLUCOSE: CPT

## 2022-09-17 PROCEDURE — 99232 SBSQ HOSP IP/OBS MODERATE 35: CPT | Performed by: INTERNAL MEDICINE

## 2022-09-17 PROCEDURE — 84443 ASSAY THYROID STIM HORMONE: CPT | Performed by: NURSE PRACTITIONER

## 2022-09-17 PROCEDURE — 93005 ELECTROCARDIOGRAM TRACING: CPT

## 2022-09-17 PROCEDURE — 85027 COMPLETE CBC AUTOMATED: CPT | Performed by: PHYSICIAN ASSISTANT

## 2022-09-17 PROCEDURE — 85730 THROMBOPLASTIN TIME PARTIAL: CPT | Performed by: INTERNAL MEDICINE

## 2022-09-17 RX ORDER — POTASSIUM CHLORIDE 20 MEQ/1
40 TABLET, EXTENDED RELEASE ORAL ONCE
Status: COMPLETED | OUTPATIENT
Start: 2022-09-17 | End: 2022-09-17

## 2022-09-17 RX ORDER — SODIUM CHLORIDE, SODIUM GLUCONATE, SODIUM ACETATE, POTASSIUM CHLORIDE, MAGNESIUM CHLORIDE, SODIUM PHOSPHATE, DIBASIC, AND POTASSIUM PHOSPHATE .53; .5; .37; .037; .03; .012; .00082 G/100ML; G/100ML; G/100ML; G/100ML; G/100ML; G/100ML; G/100ML
500 INJECTION, SOLUTION INTRAVENOUS ONCE
Status: COMPLETED | OUTPATIENT
Start: 2022-09-17 | End: 2022-09-18

## 2022-09-17 RX ORDER — DICYCLOMINE HYDROCHLORIDE 10 MG/1
10 CAPSULE ORAL
Status: DISCONTINUED | OUTPATIENT
Start: 2022-09-17 | End: 2022-10-04 | Stop reason: HOSPADM

## 2022-09-17 RX ORDER — INSULIN GLARGINE 100 [IU]/ML
5 INJECTION, SOLUTION SUBCUTANEOUS
Status: DISCONTINUED | OUTPATIENT
Start: 2022-09-17 | End: 2022-09-17

## 2022-09-17 RX ORDER — MAGNESIUM SULFATE HEPTAHYDRATE 40 MG/ML
2 INJECTION, SOLUTION INTRAVENOUS ONCE
Status: COMPLETED | OUTPATIENT
Start: 2022-09-17 | End: 2022-09-17

## 2022-09-17 RX ORDER — ALBUMIN, HUMAN INJ 5% 5 %
25 SOLUTION INTRAVENOUS ONCE
Status: COMPLETED | OUTPATIENT
Start: 2022-09-17 | End: 2022-09-18

## 2022-09-17 RX ADMIN — INSULIN LISPRO 1 UNITS: 100 INJECTION, SOLUTION INTRAVENOUS; SUBCUTANEOUS at 17:47

## 2022-09-17 RX ADMIN — CHOLESTYRAMINE 4 G: 4 POWDER, FOR SUSPENSION ORAL at 17:46

## 2022-09-17 RX ADMIN — ALBUMIN (HUMAN) 25 G: 12.5 INJECTION, SOLUTION INTRAVENOUS at 23:45

## 2022-09-17 RX ADMIN — POTASSIUM CHLORIDE 40 MEQ: 20 TABLET, EXTENDED RELEASE ORAL at 15:10

## 2022-09-17 RX ADMIN — FLUTICASONE FUROATE AND VILANTEROL TRIFENATATE 1 PUFF: 200; 25 POWDER RESPIRATORY (INHALATION) at 11:25

## 2022-09-17 RX ADMIN — PIPERACILLIN AND TAZOBACTAM 3.38 G: 3; .375 INJECTION, POWDER, LYOPHILIZED, FOR SOLUTION INTRAVENOUS at 15:11

## 2022-09-17 RX ADMIN — TORSEMIDE 60 MG: 20 TABLET ORAL at 11:24

## 2022-09-17 RX ADMIN — LOPERAMIDE HYDROCHLORIDE 2 MG: 2 CAPSULE ORAL at 21:38

## 2022-09-17 RX ADMIN — Medication 6 MG: at 21:38

## 2022-09-17 RX ADMIN — SODIUM CHLORIDE, SODIUM GLUCONATE, SODIUM ACETATE, POTASSIUM CHLORIDE, MAGNESIUM CHLORIDE, SODIUM PHOSPHATE, DIBASIC, AND POTASSIUM PHOSPHATE 500 ML: .53; .5; .37; .037; .03; .012; .00082 INJECTION, SOLUTION INTRAVENOUS at 20:15

## 2022-09-17 RX ADMIN — CLOTRIMAZOLE: 0.01 CREAM TOPICAL at 11:25

## 2022-09-17 RX ADMIN — CLOTRIMAZOLE: 0.01 CREAM TOPICAL at 17:46

## 2022-09-17 RX ADMIN — DICYCLOMINE HYDROCHLORIDE 10 MG: 10 CAPSULE ORAL at 21:38

## 2022-09-17 RX ADMIN — ATORVASTATIN CALCIUM 40 MG: 40 TABLET, FILM COATED ORAL at 17:46

## 2022-09-17 RX ADMIN — MAGNESIUM SULFATE HEPTAHYDRATE 2 G: 40 INJECTION, SOLUTION INTRAVENOUS at 15:11

## 2022-09-17 RX ADMIN — INSULIN LISPRO 1 UNITS: 100 INJECTION, SOLUTION INTRAVENOUS; SUBCUTANEOUS at 23:51

## 2022-09-17 RX ADMIN — PANTOPRAZOLE SODIUM 40 MG: 40 TABLET, DELAYED RELEASE ORAL at 05:19

## 2022-09-17 RX ADMIN — ALLOPURINOL 300 MG: 300 TABLET ORAL at 11:24

## 2022-09-17 RX ADMIN — PIPERACILLIN AND TAZOBACTAM 3.38 G: 3; .375 INJECTION, POWDER, LYOPHILIZED, FOR SOLUTION INTRAVENOUS at 03:00

## 2022-09-17 RX ADMIN — CHOLESTYRAMINE 4 G: 4 POWDER, FOR SUSPENSION ORAL at 11:25

## 2022-09-17 NOTE — ASSESSMENT & PLAN NOTE
· 3L out  · Rectal tube in place  · Cdiff/PCR neg  · GI following  · Questran  · Consider imodium pending effects from [place] started 9/16

## 2022-09-17 NOTE — ASSESSMENT & PLAN NOTE
· 2nd episode in one month (tx with 10 days antibx 8/2-8/11)  · CTAP acute diverticulitis  · GI following  · 7/25 Colonoscopy--2 polyps removed, Mild diverticula in the descending colon and sigmoid colon, Small, internal hemorrhoids  · Zosyn D 5/10  · Questran started 9/17   · Remains with high output 3L from rectal tube  · Clear liquids advance diet as tolerated

## 2022-09-17 NOTE — PROGRESS NOTES
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 and hypertensive nephrosclerosis, cardiorenal syndrome and multiple myeloma  · Acute kidney injury etiology:  Pre renal azotemia/large volume GI output which likely converted to ATN  Severe azotemia and oliguria  · Started on hemodialysis 9/16  · Temporary catheter placed on 9/15  · Second treatment performed earlier today  1 L fluid removal   Patient tolerated procedure  · Urine output 270 mL  · Plan:  · No hemodialysis tomorrow  · Continue oral torsemide  · Monitor closely, avoid nephrotoxic agents, hypotension  · Will evaluate on Monday for hemodialysis treatment    Metabolic acidosis:  · Initially on bicarbonate infusion transitioned Plasmalyte which was placed on hold  · Improving with hemodialysis    Acute diverticulitis:  · Large volume stool output  · C diff negative  · Management per primary team  · On Questran    Hypokalemia:  Mild, replete    Chronic systolic and diastolic combined CHF:  · Ejection fraction 45%  · 1 L volume removal on hemodialysis today  · On torsemide 60 mg daily    Anemia:  · Hemoglobin 7 5  Low but stable    Multiple myeloma:  · On Velcade and Decadron  · Follows with Dr Jessica López    Other:  · Chronic atrial fibrillation  · Morbid obesity  · Hyperlipidemia  · COPD  · Gout  · Diabetes mellitus type 2    DISPOSITION:  Evaluate for treatment on Monday  SUBJECTIVE / 24H INTERVAL HISTORY:  No acute complaints or issues at this time   Felt fatigued after treatment; no other complaints    OBJECTIVE:  Current Weight: Weight - Scale: 118 kg (260 lb 2 3 oz)  Vitals:    09/17/22 1100 09/17/22 1200 09/17/22 1300 09/17/22 1500   BP: 121/58 114/59 110/52 114/56   BP Location: Right arm      Pulse: (!) 54 67 61 (!) 54   Resp: 16 (!) 26 19 14   Temp: 97 6 °F (36 4 °C)   97 9 °F (36 6 °C)   TempSrc: Oral   Oral   SpO2: 99% 98% 100% 100%   Weight:       Height:           Intake/Output Summary (Last 24 hours) at 9/17/2022 1613  Last data filed at 9/17/2022 1522  Gross per 24 hour   Intake 2579 54 ml   Output 4718 ml   Net -2138 46 ml     General: NAD  Skin: no rash, pale  Eyes: anicteric sclera  ENT:  Oropharynx appears slightly dry  Neck: supple, no JVD  Chest: CTA b/l, no ronchii, no wheeze, no rubs, no rales, normal effort  CVS: s1s2, no murmur, no gallop, no rub  Regular rhythm  Abdomen: soft, nontender, nl sounds  Extremities:  + edema LE b/l    Left AKA  :  mehta  Neuro: AAOX3  Psych: normal affect  Medications:    Current Facility-Administered Medications:     acetaminophen (TYLENOL) tablet 650 mg, 650 mg, Oral, Q6H PRN, Geraline Pretzel, PA-C    albuterol (PROVENTIL HFA,VENTOLIN HFA) inhaler 2 puff, 2 puff, Inhalation, Q6H PRN, Geraline Pretzel, PA-C    allopurinol (ZYLOPRIM) tablet 300 mg, 300 mg, Oral, Daily, Geraline Pretzel, PA-C, 300 mg at 09/17/22 1124    atorvastatin (LIPITOR) tablet 40 mg, 40 mg, Oral, After Dinner, Geraline Pretzel, PA-C, 40 mg at 09/16/22 1801    cholestyramine sugar free (QUESTRAN LIGHT) packet 4 g, 4 g, Oral, BID, Geraline Pretzel, PA-C, 4 g at 09/17/22 1125    clotrimazole (LOTRIMIN) 1 % cream, , Topical, BID, Geraline Pretzel, PA-C, Given at 09/17/22 1125    fluticasone-vilanterol (BREO ELLIPTA) 200-25 MCG/INH inhaler 1 puff, 1 puff, Inhalation, Daily, Geraline Pretcheryl PA-C, 1 puff at 09/17/22 1125    heparin (porcine) 25,000 units in 0 45% NaCl 250 mL infusion (premix), 3-20 Units/kg/hr (Order-Specific), Intravenous, Titrated, Geraline PretLESLEY luna-C, Last Rate: 4 6 mL/hr at 09/16/22 2336, 5 1 Units/kg/hr at 09/16/22 2336    heparin (porcine) injection 2,000 Units, 2,000 Units, Intravenous, Q1H PRN, Cullman Panning, PA-C    heparin (porcine) injection 4,000 Units, 4,000 Units, Intravenous, Q1H PRN, Cullman Panning, PA-C    insulin lispro (HumaLOG) 100 units/mL subcutaneous injection 1-6 Units, 1-6 Units, Subcutaneous, Q6H [address] **AND** Fingerstick Glucose (POCT), , , 4x Daily AC and at bedtime, Cullman Panning, PA-C    loperamide (IMODIUM) capsule 2 mg, 2 mg, Oral, 4x Daily PRN, Cullman Panning, PA-C, 2 mg at 09/16/22 1627    magnesium sulfate 2 g/50 mL IVPB (premix) 2 g, 2 g, Intravenous, Once, [place], CRNP, 2 g at 09/17/22 1511    melatonin tablet 6 mg, 6 mg, Oral, HS, Cullman Panning, PA-C, 6 mg at 09/16/22 2132    pantoprazole (PROTONIX) EC tablet 40 mg, 40 mg, Oral, Early Morning, Cullman Panning, PA-C, 40 mg at 09/17/22 0519    piperacillin-tazobactam (ZOSYN) 3 375 g in sodium chloride 0 9 % 100 mL IVPB (EXTENDED-INFUSION), 3 375 g, Intravenous, Q12H, Cullman Panning, PA-C, Last Rate: 25 mL/hr at 09/17/22 1511, 3 375 g at 09/17/22 1511    sodium chloride 0 9 % infusion, 10 mL/hr, Intravenous, Continuous, Cullman Panning, PA-C, Last Rate: 10 mL/hr at 09/16/22 1013, 10 mL/hr at 09/16/22 1013    torsemide (DEMADEX) tablet 60 mg, 60 mg, Oral, Daily, Cullman Panning, PA-C, 60 mg at 09/17/22 1124    Laboratory Results:  Results from last 7 days   Lab Units 09/17/22  1135 09/17/22  0454 09/16/22  2356 09/16/22 2008 09/16/22  1647 09/16/22  1216 09/16/22  0907 09/16/22  0501 09/15/22  1241 09/15/22  0805 09/15/22  0529 09/14/22  1611 09/14/22  1202 09/14/22  0523 09/13/22  2322 09/13/22  1708 09/13/22  1156 09/13/22  0532 09/13/22  0107 09/12/22  2157 09/12/22  1958 09/12/22  1658   WBC Thousand/uL  --  5 47  --   --   --   --   --  5 82  --   --  5 77  --  7 10 6 41  --   --   --  7 02  --   --   --  10 20*   HEMOGLOBIN g/dL  --  7 5*  --   --   --   --   --  7 6*  --  7 1* 6 8*  --  7 8* 7 0*  --   --   --  8 3*  -- --   --  10 3*   HEMATOCRIT %  --  23 5*  --   --   --   --   --  23 6*  --   --  20 9*  --  23 7* 21 0*  --   --   --  26 2*  --   --   --  32 6*   PLATELETS Thousands/uL  --  189  --   --   --   --   --  166  --   --  141*  --  135* 123*  --   --   --  124*  --   --   --  130*   POTASSIUM mmol/L 3 4* 3 7 3 9 4 1 3 7 3 3* 3 4* 3 6   < > 3 4* 3 6   < > 3 5 3 2* 3 7 4 0 4 5 4 9   < > 5 4*   < > 5 4*   CHLORIDE mmol/L 102 103 103 103 102 102 100 102   < > 101 101   < > 102 105 106 107 109* 110*   < > 111*   < > 109*   CO2 mmol/L 28 20* 21 23 21 24 20* 20*   < > 24 24   < > 20* 18* 17* 17* 17* 12*   < > 12*   < > 10*   BUN mg/dL 34* 66* 68* 68* 66* 63* 91* 98*   < > 97* 100*   < > 101* 103* 103* 98* 102* 101*   < > 104*   < > 103*   CREATININE mg/dL 4 22* 7 71* 7 32* 7 35* 6 89* 6 45* 9 13* 9 11*   < > 8 44* 8 45*   < > 8 26* 8 06* 8 17* 8 14* 8 20* 8 21*   < > 8 32*   < > 8 44*   CALCIUM mg/dL 8 0* 8 2* 8 6 8 4 8 3 8 1* 8 4 8 4   < > 7 8* 8 3   < > 8 1* 7 7* 7 6* 7 8* 7 9* 8 1*   < > 8 4   < > 8 5   MAGNESIUM mg/dL 1 7 2 1 2 6 2 3 2 1 2 1 2 5 2 7*   < > 2 1 2 5   < > 2 2 1 9 1 5* 1 6 1 6 1 6  --  1 1*   < >  --    PHOSPHORUS mg/dL  --  6 0*  --   --   --   --   --   --   --   --   --   --   --  6 1* 5 6* 5 4* 5 4* 5 4*  --  5 4*  --   --     < > = values in this interval not displayed

## 2022-09-17 NOTE — PROGRESS NOTES
[place]  Progress Note - [place] 1950, 81 y o  male MRN: [id number]  Unit/Bed#: -01 Encounter: [id number]  Primary Care Provider: Eloina Sharpe MD   Date and time admitted to hospital: 9/12/2022  4:09 PM    * DORA (acute kidney injury) [facility])  Assessment & Plan  · DORA on CKD 3  · Most recent d/c creat 3-3 7 prior in 2 range  · CT no hydro, nonobstructing calculi  · UA neg  · Nephrology following  · 9/15 R tunneled HD cath placed  · 9/15 and 9/16 IHD  · Monitor for renal recovery remains oliguric  in 24hrs  · Neg 1 5L with large output stool 3L  · Bolus PRN to keep even  · Consider d/c demadex will discuss with nephro  · D/c mehta  · Bladder scan q shift  Acute diverticulitis  Assessment & Plan  · 2nd episode in one month (tx with 10 days antibx 8/2-8/11)  · CTAP acute diverticulitis  · GI following  · 7/25 Colonoscopy--2 polyps removed, Mild diverticula in the descending colon and sigmoid colon, Small, internal hemorrhoids  · Zosyn D 5/10  · Christofer Barrs started 9/17   · Remains with high output 3L from rectal tube  · Clear liquids advance diet as tolerated    Chronic combined systolic and diastolic congestive heart failure (HCC)  Assessment & Plan  Wt Readings from Last 3 Encounters:   09/17/22 118 kg (260 lb 2 3 oz)   09/06/22 117 kg (257 lb 15 oz)   08/30/22 118 kg (260 lb)     · EF 45% decreased RV fx  · Appears euvolemic to dry prior weights noted 126kg  · Consider d/c demadex   · I/O  · PRN boluses  · Daily weight        Diarrhea  Assessment & Plan  · 3L out  · Rectal tube in place  · Cdiff/PCR neg  · GI following  · Questran  · Consider imodium pending effects from Josselin Chroman started 9/16    Abnormal CT scan  Assessment & Plan  · CTAP-common bile duct stent with pneumobilia and air within gallblader  Distended gallbladder with cholelithiasis  · RUQ u/s distended gallbladder with air within stent in CBD similar to CT  · Surgery consulted   If concern for acute cholecystitis not surgical candidate would need per vanessa tube  · GI following  · ERCP 8/29/2022, Valente Vigil esophagus seen during EGD, biopsy obtained   ERCP performed with endoscopic mucosal resection of abnormal looking ampullary tissue, PD, CBD stent placed  · No s/s acute cholecystitis monitor     Chronic atrial fibrillation (HCC)  Assessment & Plan  · No rate control meds d/c in august due to bradycardia  · TSH check  · Awaiting med list INR 2 5 with coumadin held since admission  · Heparin gtt--consider transition back to coumadin     Morbid obesity ([facility] [address] )  Assessment & Plan  · Dietary education    Hyperlipidemia  Assessment & Plan  · Home statin    Multiple myeloma ([facility] )  Assessment & Plan  · Follows with Dr Jelani Thakkar  · On Velcade and decadron last tx 9/6  · Free light chains pending    Ambulatory dysfunction  Assessment & Plan  · 2/2 R AKA  · PT/OT    Chronic obstructive pulmonary disease, unspecified ([place] [id number] )  Assessment & Plan  · Home inhaler  · Not AE    GERD (gastroesophageal reflux disease)  Assessment & Plan  · PPI    Hypokalemia due to excessive gastrointestinal loss of potassium  Assessment & Plan  · Replete k/mg and recheck    Anemia  Assessment & Plan  · Baseline hgb 8  · Receives IV venofer  · stable    Gout  Assessment & Plan  · Home allopurinol    Diabetes mellitus, type 2 [facility])  Assessment & Plan  Lab Results   Component Value Date    HGBA1C 6 3 (H) 08/05/2022       Recent Labs     09/16/22  2100 09/16/22  2354 09/17/22  0553 09/17/22  1202   POCGLU 186* 122 121 83       Blood Sugar Average: Last 72 hrs:  (P) 135 125   · Hold lantus with DORA/hypoglycemia  · SSI    VTE Pharmacologic Prophylaxis:   Pharmacologic: Heparin Drip  Mechanical VTE Prophylaxis in Place: Yes    Patient Centered Rounds: I have performed bedside rounds with nursing staff today      Discussions with Specialists or Other Care Team Provider: renal pending will discuss after evaluated    Education and Discussions with Family / Patient: patient to update son    Time Spent for Care: 45 minutes  More than 50% of total time spent on counseling and coordination of care as described above  Current Length of Stay: 5 day(s)    Current Patient Status: Inpatient   Certification Statement: The patient will continue to require additional inpatient hospital stay due to diverticulitis/rafaela    Discharge Plan: pending    Code Status: Level 1 - Full Code      Subjective:   Tired after dialysis  Denies sob/cp/abd pain/n/v    Objective:     Vitals:   Temp (24hrs), Av 9 °F (36 6 °C), Min:97 6 °F (36 4 °C), Max:98 1 °F (36 7 °C)    Temp:  [97 6 °F (36 4 °C)-98 1 °F (36 7 °C)] 97 6 °F (36 4 °C)  HR:  [44-67] 61  Resp:  [14-26] 19  BP: ()/(50-59) 110/52  SpO2:  [98 %-100 %] 100 %  Body mass index is 32 52 kg/m²  Input and Output Summary (last 24 hours): Intake/Output Summary (Last 24 hours) at 2022 1429  Last data filed at 2022 1056  Gross per 24 hour   Intake 2053 36 ml   Output 4743 ml   Net -2689 64 ml       Physical Exam:     Physical Exam  Constitutional:       General: He is not in acute distress  HENT:      Head: Normocephalic and atraumatic  Mouth/Throat:      Mouth: Mucous membranes are dry  Eyes:      General: No scleral icterus  Pupils: Pupils are equal, round, and reactive to light  Neck:      Vascular: No JVD  Cardiovascular:      Rate and Rhythm: Bradycardia present  Rhythm irregular  Heart sounds: Normal heart sounds  No murmur heard  No friction rub  No gallop  Pulmonary:      Effort: Pulmonary effort is normal  No respiratory distress  Breath sounds: Normal breath sounds  No wheezing or rales  Abdominal:      General: Bowel sounds are normal  There is no distension  Palpations: Abdomen is soft  Tenderness: There is no abdominal tenderness  Genitourinary:     Comments: Eddi  Musculoskeletal:      Cervical back: Neck supple        Comments: L aka   Skin: General: Skin is warm and dry  Coloration: Skin is pale  Findings: No erythema or rash  Neurological:      Mental Status: He is alert and oriented to person, place, and time  Cranial Nerves: No cranial nerve deficit  Additional Data:     Labs:    Results from last 7 days   Lab Units 09/17/22  0454 09/15/22  0529 09/14/22  1202   WBC Thousand/uL 5 47   < > 7 10   HEMOGLOBIN g/dL 7 5*   < > 7 8*   HEMATOCRIT % 23 5*   < > 23 7*   PLATELETS Thousands/uL 189   < > 135*   NEUTROS PCT %  --   --  72   LYMPHS PCT %  --   --  15   MONOS PCT %  --   --  9   EOS PCT %  --   --  3    < > = values in this interval not displayed  Results from last 7 days   Lab Units 09/17/22  1135 09/14/22  1202 09/14/22  0523   SODIUM mmol/L 140   < > 140   POTASSIUM mmol/L 3 4*   < > 3 2*   CHLORIDE mmol/L 102   < > 105   CO2 mmol/L 28   < > 18*   BUN mg/dL 34*   < > 103*   CREATININE mg/dL 4 22*   < > 8 06*   ANION GAP mmol/L 10   < > 17*   CALCIUM mg/dL 8 0*   < > 7 7*   ALBUMIN g/dL  --   --  2 3*   TOTAL BILIRUBIN mg/dL  --   --  0 60   ALK PHOS U/L  --   --  92   ALT U/L  --   --  14   AST U/L  --   --  14   GLUCOSE RANDOM mg/dL 70   < > 132    < > = values in this interval not displayed  Results from last 7 days   Lab Units 09/16/22  0501   INR  2 54*     Results from last 7 days   Lab Units 09/17/22  1202 09/17/22  0553 09/16/22  2354 09/16/22  2100 09/16/22  1723 09/16/22  1214 09/16/22  0536 09/16/22  0054 09/15/22  2226 09/15/22  1618 09/15/22  1240 09/15/22  0525   POC GLUCOSE mg/dl 83 121 122 186* 112 83 115 162* 200* 109 152* 148*         Results from last 7 days   Lab Units 09/13/22  0532 09/12/22  1658   LACTIC ACID mmol/L  --  1 2   PROCALCITONIN ng/ml 0 62* 0 60*           * I Have Reviewed All Lab Data Listed Above  * Additional Pertinent Lab Tests Reviewed:  All Labs For Current Hospital Admission Reviewed    Imaging:    Imaging Reports Reviewed Today Include: ct  Imaging Personally Reviewed by Myself Includes:  na    Recent Cultures (last 7 days):     Results from last 7 days   Lab Units 09/13/22  1708 09/12/22  2157 09/12/22  1658   BLOOD CULTURE   --   --  No Growth After 4 Days  No Growth After 4 Days  LEGIONELLA URINARY ANTIGEN   --  Negative  --    C DIFF TOXIN B BY PCR  Negative  --   --        Last 24 Hours Medication List:   Current Facility-Administered Medications   Medication Dose Route Frequency Provider Last Rate    acetaminophen  650 mg Oral Q6H PRN Alexandrea Lomeli PA-C      albuterol  2 puff Inhalation Q6H PRN Alexandrea Lomeli PA-C      allopurinol  300 mg Oral Daily Alexandrea Lomeli PA-C      atorvastatin  40 mg Oral After Johanna Hinton PA-C      cholestyramine sugar free  4 g Oral BID Alexandrea Lomeli PA-C      clotrimazole   Topical BID Alexandrea Lomeli PA-C      fluticasone-vilanterol  1 puff Inhalation Daily Alexandrea Lomeli PA-C      heparin (porcine)  3-20 Units/kg/hr (Order-Specific) Intravenous Titrated Alexandrea Lomeli PA-C 5 1 Units/kg/hr (09/16/22 2336)    heparin (porcine)  2,000 Units Intravenous Q1H PRN Alexandrea Lomeli PA-C      heparin (porcine)  4,000 Units Intravenous Q1H PRN Alexandrea Lomeli PA-C      insulin lispro  1-6 Units Subcutaneous Q6H 43 [facility] JOEY      loperamide  2 mg Oral 4x Daily PRN Alexandrea Lomeli PA-C      magnesium sulfate  2 g Intravenous Once NICOLA Harper      melatonin  6 mg Oral HS Alexandrea Lomeli PA-C      pantoprazole  40 mg Oral Early Morning Alexandrea Lomeli PA-C      piperacillin-tazobactam  3 375 g Intravenous Q12H Alexandrea Lomeli PA-C 3 375 g (09/17/22 0300)    potassium chloride  40 mEq Oral Once NICOLA Harper      sodium chloride  10 mL/hr Intravenous Continuous Alexandrea Lomeli PA-C 10 mL/hr (09/16/22 1013)    torsemide  60 mg Oral Daily Alexandrea Lomeli PA-C          Today, Patient Was Seen By: NICOLA Harper    ** Please Note: Dictation voice to text software may have been used in the creation of this document   **

## 2022-09-17 NOTE — ASSESSMENT & PLAN NOTE
· No rate control meds d/c in august due to bradycardia  · TSH check  · Awaiting med list INR 2 5 with coumadin held since admission  · Heparin gtt--consider transition back to coumadin

## 2022-09-17 NOTE — ASSESSMENT & PLAN NOTE
· DORA on CKD 3  · Most recent d/c creat 3-3 7 prior in 2 range  · CT no hydro, nonobstructing calculi  · UA neg  · Nephrology following  · 9/15 R tunneled HD cath placed  · 9/15 and 9/16 IHD  · Monitor for renal recovery remains oliguric  in 24hrs  · Neg 1 5L with large output stool 3L  · Bolus PRN to keep even  · Consider d/c demadex will discuss with nephro  · D/c mehta  · Bladder scan q shift

## 2022-09-17 NOTE — PROGRESS NOTES
[place]  [id number]    81 y o   male      ASSESSMENT and PLAN    Abdominal pain/acute sigmoid diverticulitis - no peritoneal signs but pain persists  Unclear how much pain is related to ongoing diverticulitis versus bowel spasm  Will try adding dicyclomine  · Continue clears  · Continue IV antibiotics  · Add dicyclomine     Diarrhea - C diff and culture negative  Suspect secondary to antibiotics  Cholestyramine added but not clear how much she is taking  · Encourage taking cholestyramine  · Add dicyclomine       Chief Complaint   Patient presents with    Abnormal Lab     Patient [facility] EMS [facility] for an elevated creatinine of 7 3  Reports yellow emesis for a few days  Patient has RUQ and RLQ abdominal pain, is currently getting chemo once a week for kidney cancer  SUBJECTIVE/HPI continues to have diffuse lower abdominal pain and diarrhea  No melena or hematochezia  Nursing reporting reluctant to take cholestyramine  Reviewed negative stool studies      /53   Pulse (!) 49   Temp 97 9 °F (36 6 °C) (Oral)   Resp 15   Ht 6' 3" (1 905 m)   Wt 118 kg (260 lb 2 3 oz)   SpO2 100%   BMI 32 52 kg/m²     PHYSICALEXAM  General appearance: alert, appears stated age and cooperative  Head: Normocephalic, without obvious abnormality, atraumatic  Lungs:  Decreased breath sounds but clear to auscultation bilaterally  Heart: S1, S2 normal, + murmur  Abdomen: soft, minimal lower abdominal tenderness without rebound or guarding; bowel sounds normal; no masses,  no organomegaly  Neurologic: Grossly normal    Lab Results   Component Value Date    GLUCOSE 64 (L) 08/01/2022    CALCIUM 8 0 (L) 09/17/2022     01/15/2018    K 3 4 (L) 09/17/2022    CO2 28 09/17/2022     09/17/2022    BUN 34 (H) 09/17/2022    CREATININE 4 22 (H) 09/17/2022     Lab Results   Component Value Date    WBC 5 47 09/17/2022    HGB 7 5 (L) 09/17/2022    HCT 23 5 (L) 09/17/2022    MCV 84 09/17/2022  09/17/2022     Lab Results   Component Value Date    ALT 14 09/14/2022    AST 14 09/14/2022     (H) 11/03/2019    ALKPHOS 92 09/14/2022    BILITOT 0 6 01/15/2018     No results found for: AMYLASE  Lab Results   Component Value Date    LIPASE 691 (H) 09/13/2022     Lab Results   Component Value Date    IRON 17 (L) 08/04/2022    TIBC 206 (L) 08/04/2022    FERRITIN 165 08/04/2022     Lab Results   Component Value Date    INR 2 54 (H) 09/16/2022       Counseling / Coordination of Care  Total floor / unit time spent today 20 minutes

## 2022-09-17 NOTE — ASSESSMENT & PLAN NOTE
· CTAP-common bile duct stent with pneumobilia and air within gallblader  Distended gallbladder with cholelithiasis  · RUQ u/s distended gallbladder with air within stent in CBD similar to CT  · Surgery consulted  If concern for acute cholecystitis not surgical candidate would need per vanessa tube  · GI following  · ERCP 8/29/2022, Dr Regla Donis, Martha Cuevas esophagus seen during EGD, biopsy obtained   ERCP performed with endoscopic mucosal resection of abnormal looking ampullary tissue, PD, CBD stent placed    · No s/s acute cholecystitis monitor

## 2022-09-17 NOTE — ASSESSMENT & PLAN NOTE
Wt Readings from Last 3 Encounters:   09/17/22 118 kg (260 lb 2 3 oz)   09/06/22 117 kg (257 lb 15 oz)   08/30/22 118 kg (260 lb)     · EF 45% decreased RV fx  · Appears euvolemic to dry prior weights noted 126kg  · Consider d/c demadex   · I/O  · PRN boluses  · Daily weight

## 2022-09-17 NOTE — PLAN OF CARE
Problem: Potential for Falls  Goal: Patient will remain free of falls  Description: INTERVENTIONS:  - Educate patient/family on patient safety including physical limitations  - Instruct patient to call for assistance with activity   - Consult OT/PT to assist with strengthening/mobility   - Keep Call bell within reach  - Keep bed low and locked with side rails adjusted as appropriate  - Keep care items and personal belongings within reach  - Initiate and maintain comfort rounds  - Make Fall Risk Sign visible to staff    - Apply yellow socks and bracelet for high fall risk patients  - Consider moving patient to room near nurses station  9/16/2022 2149 by Waylon Saab RN  Outcome: Progressing  9/16/2022 2149 by Waylon Saab RN  Outcome: Progressing     Problem: MOBILITY - ADULT  Goal: Maintain or return to baseline ADL function  Description: INTERVENTIONS:  -  Assess patient's ability to carry out ADLs; assess patient's baseline for ADL function and identify physical deficits which impact ability to perform ADLs (bathing, care of mouth/teeth, toileting, grooming, dressing, etc )  - Assess/evaluate cause of self-care deficits   - Assess range of motion  - Assess patient's mobility; develop plan if impaired  - Assess patient's need for assistive devices and provide as appropriate  - Encourage maximum independence but intervene and supervise when necessary  - Involve family in performance of ADLs  - Assess for home care needs following discharge   - Consider OT consult to assist with ADL evaluation and planning for discharge  - Provide patient education as appropriate  9/16/2022 2149 by Waylon Saab RN  Outcome: Progressing  9/16/2022 2149 by Waylon Saab RN  Outcome: Progressing  Goal: Maintains/Returns to pre admission functional level  Description: INTERVENTIONS:  - Perform BMAT or MOVE assessment daily    - Set and communicate daily mobility goal to care team and patient/family/caregiver     - Collaborate with rehabilitation services on mobility goals if consulted    - Out of bed for toileting  - Record patient progress and toleration of activity level   9/16/2022 2149 by Chris Boucher RN  Outcome: Progressing  9/16/2022 2149 by Chris Boucher RN  Outcome: Progressing     Problem: Prexisting or High Potential for Compromised Skin Integrity  Goal: Skin integrity is maintained or improved  Description: INTERVENTIONS:  - Identify patients at risk for skin breakdown  - Assess and monitor skin integrity  - Assess and monitor nutrition and hydration status  - Monitor labs   - Assess for incontinence   - Turn and reposition patient  - Assist with mobility/ambulation  - Relieve pressure over bony prominences  - Avoid friction and shearing  - Provide appropriate hygiene as needed including keeping skin clean and dry  - Evaluate need for skin moisturizer/barrier cream  - Collaborate with interdisciplinary team   - Patient/family teaching  - Consider wound care consult   9/16/2022 2149 by Chris Boucher RN  Outcome: Progressing  9/16/2022 2149 by Chris Boucher RN  Outcome: Progressing     Problem: Nutrition/Hydration-ADULT  Goal: Nutrient/Hydration intake appropriate for improving, restoring or maintaining nutritional needs  Description: Monitor and assess patient's nutrition/hydration status for malnutrition  Collaborate with interdisciplinary team and initiate plan and interventions as ordered  Monitor patient's weight and dietary intake as ordered or per policy  Utilize nutrition screening tool and intervene as necessary  Determine patient's food preferences and provide high-protein, high-caloric foods as appropriate       INTERVENTIONS:  - Monitor oral intake, urinary output, labs, and treatment plans  - Assess nutrition and hydration status and recommend course of action  - Evaluate amount of meals eaten  - Assist patient with eating if necessary   - Allow adequate time for meals  - Recommend/ encourage appropriate diets, oral nutritional supplements, and vitamin/mineral supplements  - Order, calculate, and assess calorie counts as needed  - Recommend, monitor, and adjust tube feedings and TPN/PPN based on assessed needs  - Assess need for intravenous fluids  - Provide specific nutrition/hydration education as appropriate  - Include patient/family/caregiver in decisions related to nutrition  9/16/2022 2149 by Terry Rae RN  Outcome: Progressing  9/16/2022 2149 by Terry Rae RN  Outcome: Progressing     Problem: GASTROINTESTINAL - ADULT  Goal: Minimal or absence of nausea and/or vomiting  Description: INTERVENTIONS:  - Administer IV fluids if ordered to ensure adequate hydration  - Maintain NPO status until nausea and vomiting are resolved  - Nasogastric tube if ordered  - Administer ordered antiemetic medications as needed  - Provide nonpharmacologic comfort measures as appropriate  - Advance diet as tolerated, if ordered  - Consider nutrition services referral to assist patient with adequate nutrition and appropriate food choices  9/16/2022 2149 by Terry Rae RN  Outcome: Progressing  9/16/2022 2149 by Terry Rae RN  Outcome: Progressing  Goal: Maintains or returns to baseline bowel function  Description: INTERVENTIONS:  - Assess bowel function  - Encourage oral fluids to ensure adequate hydration  - Administer IV fluids if ordered to ensure adequate hydration  - Administer ordered medications as needed  - Encourage mobilization and activity  - Consider nutritional services referral to assist patient with adequate nutrition and appropriate food choices  9/16/2022 2149 by Terry Rae RN  Outcome: Progressing  9/16/2022 2149 by Terry Rae RN  Outcome: Progressing  Goal: Maintains adequate nutritional intake  Description: INTERVENTIONS:  - Monitor percentage of each meal consumed  - Identify factors contributing to decreased intake, treat as appropriate  - Assist with meals as needed  - Monitor I&O, weight, and lab values if indicated  - Obtain nutrition services referral as needed  9/16/2022 2149 by Chris Boucher RN  Outcome: Progressing  9/16/2022 2149 by Chris Boucher RN  Outcome: Progressing     Problem: GENITOURINARY - ADULT  Goal: Maintains or returns to baseline urinary function  Description: INTERVENTIONS:  - Assess urinary function  - Encourage oral fluids to ensure adequate hydration if ordered  - Administer IV fluids as ordered to ensure adequate hydration  - Administer ordered medications as needed  - Offer frequent toileting  - Follow urinary retention protocol if ordered  9/16/2022 2149 by Chris Boucher RN  Outcome: Progressing  9/16/2022 2149 by Chris Boucher RN  Outcome: Progressing  Goal: Absence of urinary retention  Description: INTERVENTIONS:  - Assess patients ability to void and empty bladder  - Monitor I/O  - Bladder scan as needed  - Discuss with physician/AP medications to alleviate retention as needed  - Discuss catheterization for long term situations as appropriate  9/16/2022 2149 by Chris Boucher RN  Outcome: Progressing  9/16/2022 2149 by Chris Boucher RN  Outcome: Progressing  Goal: Urinary catheter remains patent  Description: INTERVENTIONS:  - Assess patency of urinary catheter  - If patient has a chronic mehta, consider changing catheter if non-functioning  - Follow guidelines for intermittent irrigation of non-functioning urinary catheter  9/16/2022 2149 by Chris Boucher RN  Outcome: Progressing  9/16/2022 2149 by Chris Boucher RN  Outcome: Progressing     Problem: METABOLIC, FLUID AND ELECTROLYTES - ADULT  Goal: Electrolytes maintained within normal limits  Description: INTERVENTIONS:  - Monitor labs and assess patient for signs and symptoms of electrolyte imbalances  - Administer electrolyte replacement as ordered  - Monitor response to electrolyte replacements, including repeat lab results as appropriate  - Instruct patient on fluid and nutrition as appropriate  9/16/2022 2149 by Chris Boucher RN  Outcome: Progressing  9/16/2022 2149 by Danny Yuen RN  Outcome: Progressing  Goal: Fluid balance maintained  Description: INTERVENTIONS:  - Monitor labs   - Monitor I/O and WT  - Instruct patient on fluid and nutrition as appropriate  - Assess for signs & symptoms of volume excess or deficit  9/16/2022 2149 by Danny Yuen RN  Outcome: Progressing  9/16/2022 2149 by Danny Yuen RN  Outcome: Progressing     Problem: PAIN - ADULT  Goal: Verbalizes/displays adequate comfort level or baseline comfort level  Description: Interventions:  - Encourage patient to monitor pain and request assistance  - Assess pain using appropriate pain scale  - Administer analgesics based on type and severity of pain and evaluate response  - Implement non-pharmacological measures as appropriate and evaluate response  - Consider cultural and social influences on pain and pain management  - Notify physician/advanced practitioner if interventions unsuccessful or patient reports new pain  Outcome: Progressing     Problem: INFECTION - ADULT  Goal: Absence or prevention of progression during hospitalization  Description: INTERVENTIONS:  - Assess and monitor for signs and symptoms of infection  - Monitor lab/diagnostic results  - Monitor all insertion sites, i e  indwelling lines, tubes, and drains  - Monitor endotracheal if appropriate and nasal secretions for changes in amount and color  - [place] appropriate cooling/warming therapies per order  - Administer medications as ordered  - Instruct and encourage patient and family to use good hand hygiene technique  - Identify and instruct in appropriate isolation precautions for identified infection/condition  Outcome: Progressing  Goal: Absence of fever/infection during neutropenic period  Description: INTERVENTIONS:  - Monitor WBC    Outcome: Progressing     Problem: SAFETY ADULT  Goal: Patient will remain free of falls  Description: INTERVENTIONS:  - Educate patient/family on patient safety including physical limitations  - Instruct patient to call for assistance with activity   - Consult OT/PT to assist with strengthening/mobility   - Keep Call bell within reach  - Keep bed low and locked with side rails adjusted as appropriate  - Keep care items and personal belongings within reach  - Initiate and maintain comfort rounds  - Make Fall Risk Sign visible to staff    - Apply yellow socks and bracelet for high fall risk patients  - Consider moving patient to room near nurses station  9/16/2022 2149 by Yeimi Connell RN  Outcome: Progressing  9/16/2022 2149 by Yeimi Connell RN  Outcome: Progressing  Goal: Maintain or return to baseline ADL function  Description: INTERVENTIONS:  -  Assess patient's ability to carry out ADLs; assess patient's baseline for ADL function and identify physical deficits which impact ability to perform ADLs (bathing, care of mouth/teeth, toileting, grooming, dressing, etc )  - Assess/evaluate cause of self-care deficits   - Assess range of motion  - Assess patient's mobility; develop plan if impaired  - Assess patient's need for assistive devices and provide as appropriate  - Encourage maximum independence but intervene and supervise when necessary  - Involve family in performance of ADLs  - Assess for home care needs following discharge   - Consider OT consult to assist with ADL evaluation and planning for discharge  - Provide patient education as appropriate  9/16/2022 2149 by Yeimi Connell RN  Outcome: Progressing  9/16/2022 2149 by Yeimi Connell RN  Outcome: Progressing  Goal: Maintains/Returns to pre admission functional level  Description: INTERVENTIONS:  - Perform BMAT or MOVE assessment daily    - Set and communicate daily mobility goal to care team and patient/family/caregiver     - Collaborate with rehabilitation services on mobility goals if consulted    - Out of bed for toileting  - Record patient progress and toleration of activity level   9/16/2022 2149 by Karla Mckinley Levell Foot RN  Outcome: Progressing  9/16/2022 2149 by Linette Munoz RN  Outcome: Progressing     Problem: DISCHARGE PLANNING  Goal: Discharge to home or other facility with appropriate resources  Description: INTERVENTIONS:  - Identify barriers to discharge w/patient and caregiver  - Arrange for needed discharge resources and transportation as appropriate  - Identify discharge learning needs (meds, wound care, etc )  - Arrange for interpretive services to assist at discharge as needed  - Refer to Case Management Department for coordinating discharge planning if the patient needs post-hospital services based on physician/advanced practitioner order or complex needs related to functional status, cognitive ability, or social support system  Outcome: Progressing     Problem: Knowledge Deficit  Goal: Patient/family/caregiver demonstrates understanding of disease process, treatment plan, medications, and discharge instructions  Description: Complete learning assessment and assess knowledge base    Interventions:  - Provide teaching at level of understanding  - Provide teaching via preferred learning methods  Outcome: Progressing

## 2022-09-17 NOTE — ASSESSMENT & PLAN NOTE
Lab Results   Component Value Date    HGBA1C 6 3 (H) 08/05/2022       Recent Labs     09/16/22  2100 09/16/22  2354 09/17/22  0553 09/17/22  1202   POCGLU 186* 122 121 83       Blood Sugar Average: Last 72 hrs:  (P) 135 125   · Hold lantus with DORA/hypoglycemia  · SSI

## 2022-09-17 NOTE — PLAN OF CARE
Second HD tx, net UF goal 1kg over 3 hours as cristhian via [facility] NTC  See flowsheets for further assessment details  Post-Dialysis RN Treatment Note    Blood Pressure:  Pre 109/57 mm/Hg  Post 113/57 mmHg   Weight:  Pre 118 kg   Post 117 kg   Mode of weight measurement: Bed Scale   Volume Removed  1000 ml    Treatment duration 180 minutes    NS given  No    Treatment shortened?  No   Medications given during Rx None Reported   Estimated Kt/V  None Reported   Access type: Temporary HD catheter   Access Issues: No    Report called to primary nurse   Yes, Selam Dasilva RN

## 2022-09-18 PROBLEM — I95.9 HYPOTENSION: Status: ACTIVE | Noted: 2022-01-01

## 2022-09-18 LAB
ALBUMIN SERPL BCP-MCNC: 2.2 G/DL (ref 3.5–5)
ALP SERPL-CCNC: 87 U/L (ref 46–116)
ALT SERPL W P-5'-P-CCNC: 8 U/L (ref 12–78)
ANION GAP SERPL CALCULATED.3IONS-SCNC: 11 MMOL/L (ref 4–13)
APTT PPP: 66 SECONDS (ref 23–37)
AST SERPL W P-5'-P-CCNC: 14 U/L (ref 5–45)
BASOPHILS # BLD AUTO: 0.02 THOUSANDS/ΜL (ref 0–0.1)
BASOPHILS NFR BLD AUTO: 1 % (ref 0–1)
BILIRUB DIRECT SERPL-MCNC: 0.21 MG/DL (ref 0–0.2)
BILIRUB SERPL-MCNC: 0.5 MG/DL (ref 0.2–1)
BUN SERPL-MCNC: 32 MG/DL (ref 5–25)
CALCIUM SERPL-MCNC: 7.6 MG/DL (ref 8.3–10.1)
CHLORIDE SERPL-SCNC: 104 MMOL/L (ref 96–108)
CO2 SERPL-SCNC: 25 MMOL/L (ref 21–32)
CREAT SERPL-MCNC: 4.92 MG/DL (ref 0.6–1.3)
EOSINOPHIL # BLD AUTO: 0.32 THOUSAND/ΜL (ref 0–0.61)
EOSINOPHIL NFR BLD AUTO: 8 % (ref 0–6)
ERYTHROCYTE [DISTWIDTH] IN BLOOD BY AUTOMATED COUNT: 19 % (ref 11.6–15.1)
GFR SERPL CREATININE-BSD FRML MDRD: 11 ML/MIN/1.73SQ M
GLUCOSE SERPL-MCNC: 107 MG/DL (ref 65–140)
GLUCOSE SERPL-MCNC: 118 MG/DL (ref 65–140)
GLUCOSE SERPL-MCNC: 119 MG/DL (ref 65–140)
GLUCOSE SERPL-MCNC: 132 MG/DL (ref 65–140)
GLUCOSE SERPL-MCNC: 136 MG/DL (ref 65–140)
GLUCOSE SERPL-MCNC: 137 MG/DL (ref 65–140)
GLUCOSE SERPL-MCNC: 147 MG/DL (ref 65–140)
HCT VFR BLD AUTO: 22.1 % (ref 36.5–49.3)
HGB BLD-MCNC: 7 G/DL (ref 12–17)
IMM GRANULOCYTES # BLD AUTO: 0.05 THOUSAND/UL (ref 0–0.2)
IMM GRANULOCYTES NFR BLD AUTO: 1 % (ref 0–2)
INR PPP: 2.12 (ref 0.84–1.19)
LYMPHOCYTES # BLD AUTO: 0.92 THOUSANDS/ΜL (ref 0.6–4.47)
LYMPHOCYTES NFR BLD AUTO: 22 % (ref 14–44)
MAGNESIUM SERPL-MCNC: 2 MG/DL (ref 1.6–2.6)
MCH RBC QN AUTO: 26.5 PG (ref 26.8–34.3)
MCHC RBC AUTO-ENTMCNC: 31.7 G/DL (ref 31.4–37.4)
MCV RBC AUTO: 84 FL (ref 82–98)
MONOCYTES # BLD AUTO: 0.57 THOUSAND/ΜL (ref 0.17–1.22)
MONOCYTES NFR BLD AUTO: 14 % (ref 4–12)
NEUTROPHILS # BLD AUTO: 2.24 THOUSANDS/ΜL (ref 1.85–7.62)
NEUTS SEG NFR BLD AUTO: 54 % (ref 43–75)
NRBC BLD AUTO-RTO: 1 /100 WBCS
PLATELET # BLD AUTO: 203 THOUSANDS/UL (ref 149–390)
PMV BLD AUTO: 11.8 FL (ref 8.9–12.7)
POTASSIUM SERPL-SCNC: 3.3 MMOL/L (ref 3.5–5.3)
PROT SERPL-MCNC: 5.3 G/DL (ref 6.4–8.4)
PROTHROMBIN TIME: 24.9 SECONDS (ref 11.6–14.5)
RBC # BLD AUTO: 2.64 MILLION/UL (ref 3.88–5.62)
SODIUM SERPL-SCNC: 140 MMOL/L (ref 135–147)
WBC # BLD AUTO: 4.12 THOUSAND/UL (ref 4.31–10.16)

## 2022-09-18 PROCEDURE — 85025 COMPLETE CBC W/AUTO DIFF WBC: CPT | Performed by: NURSE PRACTITIONER

## 2022-09-18 PROCEDURE — 80076 HEPATIC FUNCTION PANEL: CPT | Performed by: NURSE PRACTITIONER

## 2022-09-18 PROCEDURE — 83735 ASSAY OF MAGNESIUM: CPT | Performed by: NURSE PRACTITIONER

## 2022-09-18 PROCEDURE — 99232 SBSQ HOSP IP/OBS MODERATE 35: CPT | Performed by: INTERNAL MEDICINE

## 2022-09-18 PROCEDURE — 99223 1ST HOSP IP/OBS HIGH 75: CPT | Performed by: INTERNAL MEDICINE

## 2022-09-18 PROCEDURE — 82232 ASSAY OF BETA-2 PROTEIN: CPT | Performed by: NURSE PRACTITIONER

## 2022-09-18 PROCEDURE — 85730 THROMBOPLASTIN TIME PARTIAL: CPT | Performed by: NURSE PRACTITIONER

## 2022-09-18 PROCEDURE — 80048 BASIC METABOLIC PNL TOTAL CA: CPT | Performed by: NURSE PRACTITIONER

## 2022-09-18 PROCEDURE — 85610 PROTHROMBIN TIME: CPT | Performed by: NURSE PRACTITIONER

## 2022-09-18 PROCEDURE — 99232 SBSQ HOSP IP/OBS MODERATE 35: CPT | Performed by: NURSE PRACTITIONER

## 2022-09-18 PROCEDURE — 83521 IG LIGHT CHAINS FREE EACH: CPT | Performed by: NURSE PRACTITIONER

## 2022-09-18 PROCEDURE — 82948 REAGENT STRIP/BLOOD GLUCOSE: CPT

## 2022-09-18 RX ORDER — POTASSIUM CHLORIDE 20 MEQ/1
40 TABLET, EXTENDED RELEASE ORAL ONCE
Status: COMPLETED | OUTPATIENT
Start: 2022-09-18 | End: 2022-09-18

## 2022-09-18 RX ORDER — WARFARIN SODIUM 2 MG/1
2 TABLET ORAL
Status: DISCONTINUED | OUTPATIENT
Start: 2022-09-18 | End: 2022-09-21

## 2022-09-18 RX ADMIN — CLOTRIMAZOLE 1 APPLICATION: 0.01 CREAM TOPICAL at 08:09

## 2022-09-18 RX ADMIN — DICYCLOMINE HYDROCHLORIDE 10 MG: 10 CAPSULE ORAL at 06:06

## 2022-09-18 RX ADMIN — WARFARIN SODIUM 2 MG: 2 TABLET ORAL at 17:08

## 2022-09-18 RX ADMIN — CLOTRIMAZOLE: 0.01 CREAM TOPICAL at 17:08

## 2022-09-18 RX ADMIN — LOPERAMIDE HYDROCHLORIDE 2 MG: 2 CAPSULE ORAL at 05:18

## 2022-09-18 RX ADMIN — LOPERAMIDE HYDROCHLORIDE 2 MG: 2 CAPSULE ORAL at 11:07

## 2022-09-18 RX ADMIN — DICYCLOMINE HYDROCHLORIDE 10 MG: 10 CAPSULE ORAL at 16:19

## 2022-09-18 RX ADMIN — DICYCLOMINE HYDROCHLORIDE 10 MG: 10 CAPSULE ORAL at 11:07

## 2022-09-18 RX ADMIN — PIPERACILLIN AND TAZOBACTAM 3.38 G: 3; .375 INJECTION, POWDER, LYOPHILIZED, FOR SOLUTION INTRAVENOUS at 02:55

## 2022-09-18 RX ADMIN — POTASSIUM CHLORIDE 40 MEQ: 20 TABLET, EXTENDED RELEASE ORAL at 07:39

## 2022-09-18 RX ADMIN — ALLOPURINOL 300 MG: 300 TABLET ORAL at 08:09

## 2022-09-18 RX ADMIN — Medication 6 MG: at 21:54

## 2022-09-18 RX ADMIN — PIPERACILLIN AND TAZOBACTAM 3.38 G: 3; .375 INJECTION, POWDER, LYOPHILIZED, FOR SOLUTION INTRAVENOUS at 13:37

## 2022-09-18 RX ADMIN — CHOLESTYRAMINE 4 G: 4 POWDER, FOR SUSPENSION ORAL at 08:09

## 2022-09-18 RX ADMIN — SODIUM CHLORIDE 10 ML/HR: 0.9 INJECTION, SOLUTION INTRAVENOUS at 07:38

## 2022-09-18 RX ADMIN — DICYCLOMINE HYDROCHLORIDE 10 MG: 10 CAPSULE ORAL at 21:54

## 2022-09-18 RX ADMIN — FLUTICASONE FUROATE AND VILANTEROL TRIFENATATE 1 PUFF: 200; 25 POWDER RESPIRATORY (INHALATION) at 07:39

## 2022-09-18 RX ADMIN — PANTOPRAZOLE SODIUM 40 MG: 40 TABLET, DELAYED RELEASE ORAL at 05:18

## 2022-09-18 RX ADMIN — CHOLESTYRAMINE 4 G: 4 POWDER, FOR SUSPENSION ORAL at 17:08

## 2022-09-18 NOTE — NURSING NOTE
HR down to 25 seen on tele monitor while pt was sleeping  Pt was woken up and HR up to 50s maintaining afib rhythm  Pt offered cpap for his nap and refusing at this time  Pt also currently declining dinner but states he is not nausous but "just doesn't feel like eating"

## 2022-09-18 NOTE — PLAN OF CARE
Problem: Potential for Falls  Goal: Patient will remain free of falls  Description: INTERVENTIONS:  - Educate patient/family on patient safety including physical limitations  - Instruct patient to call for assistance with activity   - Consult OT/PT to assist with strengthening/mobility   - Keep Call bell within reach  - Keep bed low and locked with side rails adjusted as appropriate  - Keep care items and personal belongings within reach  - Initiate and maintain comfort rounds  - Make Fall Risk Sign visible to staff  - Apply yellow socks and bracelet for high fall risk patients  - Consider moving patient to room near nurses station  Outcome: Progressing     Problem: MOBILITY - ADULT  Goal: Maintain or return to baseline ADL function  Description: INTERVENTIONS:  -  Assess patient's ability to carry out ADLs; assess patient's baseline for ADL function and identify physical deficits which impact ability to perform ADLs (bathing, care of mouth/teeth, toileting, grooming, dressing, etc )  - Assess/evaluate cause of self-care deficits   - Assess range of motion  - Assess patient's mobility; develop plan if impaired  - Assess patient's need for assistive devices and provide as appropriate  - Encourage maximum independence but intervene and supervise when necessary  - Involve family in performance of ADLs  - Assess for home care needs following discharge   - Consider OT consult to assist with ADL evaluation and planning for discharge  - Provide patient education as appropriate  Outcome: Progressing  Goal: Maintains/Returns to pre admission functional level  Description: INTERVENTIONS:  - Perform BMAT or MOVE assessment daily    - Set and communicate daily mobility goal to care team and patient/family/caregiver     - Collaborate with rehabilitation services on mobility goals if consulted  - Out of bed for toileting  - Record patient progress and toleration of activity level   Outcome: Progressing     Problem: Prexisting or High Potential for Compromised Skin Integrity  Goal: Skin integrity is maintained or improved  Description: INTERVENTIONS:  - Identify patients at risk for skin breakdown  - Assess and monitor skin integrity  - Assess and monitor nutrition and hydration status  - Monitor labs   - Assess for incontinence   - Turn and reposition patient  - Assist with mobility/ambulation  - Relieve pressure over bony prominences  - Avoid friction and shearing  - Provide appropriate hygiene as needed including keeping skin clean and dry  - Evaluate need for skin moisturizer/barrier cream  - Collaborate with interdisciplinary team   - Patient/family teaching  - Consider wound care consult   Outcome: Progressing     Problem: Nutrition/Hydration-ADULT  Goal: Nutrient/Hydration intake appropriate for improving, restoring or maintaining nutritional needs  Description: Monitor and assess patient's nutrition/hydration status for malnutrition  Collaborate with interdisciplinary team and initiate plan and interventions as ordered  Monitor patient's weight and dietary intake as ordered or per policy  Utilize nutrition screening tool and intervene as necessary  Determine patient's food preferences and provide high-protein, high-caloric foods as appropriate       INTERVENTIONS:  - Monitor oral intake, urinary output, labs, and treatment plans  - Assess nutrition and hydration status and recommend course of action  - Evaluate amount of meals eaten  - Assist patient with eating if necessary   - Allow adequate time for meals  - Recommend/ encourage appropriate diets, oral nutritional supplements, and vitamin/mineral supplements  - Order, calculate, and assess calorie counts as needed  - Recommend, monitor, and adjust tube feedings and TPN/PPN based on assessed needs  - Assess need for intravenous fluids  - Provide specific nutrition/hydration education as appropriate  - Include patient/family/caregiver in decisions related to nutrition  Outcome: Progressing     Problem: GASTROINTESTINAL - ADULT  Goal: Minimal or absence of nausea and/or vomiting  Description: INTERVENTIONS:  - Administer IV fluids if ordered to ensure adequate hydration  - Maintain NPO status until nausea and vomiting are resolved  - Nasogastric tube if ordered  - Administer ordered antiemetic medications as needed  - Provide nonpharmacologic comfort measures as appropriate  - Advance diet as tolerated, if ordered  - Consider nutrition services referral to assist patient with adequate nutrition and appropriate food choices  Outcome: Progressing  Goal: Maintains or returns to baseline bowel function  Description: INTERVENTIONS:  - Assess bowel function  - Encourage oral fluids to ensure adequate hydration  - Administer IV fluids if ordered to ensure adequate hydration  - Administer ordered medications as needed  - Encourage mobilization and activity  - Consider nutritional services referral to assist patient with adequate nutrition and appropriate food choices  Outcome: Progressing  Goal: Maintains adequate nutritional intake  Description: INTERVENTIONS:  - Monitor percentage of each meal consumed  - Identify factors contributing to decreased intake, treat as appropriate  - Assist with meals as needed  - Monitor I&O, weight, and lab values if indicated  - Obtain nutrition services referral as needed  Outcome: Progressing     Problem: GENITOURINARY - ADULT  Goal: Maintains or returns to baseline urinary function  Description: INTERVENTIONS:  - Assess urinary function  - Encourage oral fluids to ensure adequate hydration if ordered  - Administer IV fluids as ordered to ensure adequate hydration  - Administer ordered medications as needed  - Offer frequent toileting  - Follow urinary retention protocol if ordered  Outcome: Progressing  Goal: Absence of urinary retention  Description: INTERVENTIONS:  - Assess patients ability to void and empty bladder  - Monitor I/O  - Bladder scan as needed  - Discuss with physician/AP medications to alleviate retention as needed  - Discuss catheterization for long term situations as appropriate  Outcome: Progressing  Goal: Urinary catheter remains patent  Description: INTERVENTIONS:  - Assess patency of urinary catheter  - If patient has a chronic mehta, consider changing catheter if non-functioning  - Follow guidelines for intermittent irrigation of non-functioning urinary catheter  Outcome: Progressing     Problem: METABOLIC, FLUID AND ELECTROLYTES - ADULT  Goal: Electrolytes maintained within normal limits  Description: INTERVENTIONS:  - Monitor labs and assess patient for signs and symptoms of electrolyte imbalances  - Administer electrolyte replacement as ordered  - Monitor response to electrolyte replacements, including repeat lab results as appropriate  - Instruct patient on fluid and nutrition as appropriate  Outcome: Progressing  Goal: Fluid balance maintained  Description: INTERVENTIONS:  - Monitor labs   - Monitor I/O and WT  - Instruct patient on fluid and nutrition as appropriate  - Assess for signs & symptoms of volume excess or deficit  Outcome: Progressing     Problem: PAIN - ADULT  Goal: Verbalizes/displays adequate comfort level or baseline comfort level  Description: Interventions:  - Encourage patient to monitor pain and request assistance  - Assess pain using appropriate pain scale  - Administer analgesics based on type and severity of pain and evaluate response  - Implement non-pharmacological measures as appropriate and evaluate response  - Consider cultural and social influences on pain and pain management  - Notify physician/advanced practitioner if interventions unsuccessful or patient reports new pain  Outcome: Progressing     Problem: INFECTION - ADULT  Goal: Absence or prevention of progression during hospitalization  Description: INTERVENTIONS:  - Assess and monitor for signs and symptoms of infection  - Monitor lab/diagnostic results  - Monitor all insertion sites, i e  indwelling lines, tubes, and drains  - Monitor endotracheal if appropriate and nasal secretions for changes in amount and color  - [place] appropriate cooling/warming therapies per order  - Administer medications as ordered  - Instruct and encourage patient and family to use good hand hygiene technique  - Identify and instruct in appropriate isolation precautions for identified infection/condition  Outcome: Progressing  Goal: Absence of fever/infection during neutropenic period  Description: INTERVENTIONS:  - Monitor WBC    Outcome: Progressing     Problem: SAFETY ADULT  Goal: Patient will remain free of falls  Description: INTERVENTIONS:  - Educate patient/family on patient safety including physical limitations  - Instruct patient to call for assistance with activity   - Consult OT/PT to assist with strengthening/mobility   - Keep Call bell within reach  - Keep bed low and locked with side rails adjusted as appropriate  - Keep care items and personal belongings within reach  - Initiate and maintain comfort rounds  - Make Fall Risk Sign visible to staff  - Apply yellow socks and bracelet for high fall risk patients  - Consider moving patient to room near nurses station  Outcome: Progressing  Goal: Maintain or return to baseline ADL function  Description: INTERVENTIONS:  -  Assess patient's ability to carry out ADLs; assess patient's baseline for ADL function and identify physical deficits which impact ability to perform ADLs (bathing, care of mouth/teeth, toileting, grooming, dressing, etc )  - Assess/evaluate cause of self-care deficits   - Assess range of motion  - Assess patient's mobility; develop plan if impaired  - Assess patient's need for assistive devices and provide as appropriate  - Encourage maximum independence but intervene and supervise when necessary  - Involve family in performance of ADLs  - Assess for home care needs following discharge   - Consider OT consult to assist with ADL evaluation and planning for discharge  - Provide patient education as appropriate  Outcome: Progressing  Goal: Maintains/Returns to pre admission functional level  Description: INTERVENTIONS:  - Perform BMAT or MOVE assessment daily    - Set and communicate daily mobility goal to care team and patient/family/caregiver  - Collaborate with rehabilitation services on mobility goals if consulted  - Out of bed for toileting  - Record patient progress and toleration of activity level   Outcome: Progressing     Problem: DISCHARGE PLANNING  Goal: Discharge to home or other facility with appropriate resources  Description: INTERVENTIONS:  - Identify barriers to discharge w/patient and caregiver  - Arrange for needed discharge resources and transportation as appropriate  - Identify discharge learning needs (meds, wound care, etc )  - Arrange for interpretive services to assist at discharge as needed  - Refer to Case Management Department for coordinating discharge planning if the patient needs post-hospital services based on physician/advanced practitioner order or complex needs related to functional status, cognitive ability, or social support system  Outcome: Progressing     Problem: Knowledge Deficit  Goal: Patient/family/caregiver demonstrates understanding of disease process, treatment plan, medications, and discharge instructions  Description: Complete learning assessment and assess knowledge base    Interventions:  - Provide teaching at level of understanding  - Provide teaching via preferred learning methods  Outcome: Progressing

## 2022-09-18 NOTE — PROGRESS NOTES
[place]  Progress Note - Reji Height 1950, 81 y o  male MRN: [id number]  Unit/Bed#: -01 Encounter: [id number]  Primary Care Provider: Sally Chen MD   Date and time admitted to hospital: 9/12/2022  4:09 PM    * DORA (acute kidney injury) [facility])  Assessment & Plan  · DORA on CKD 3  · Most recent d/c creat 3-3 7 prior in 2 range  · CT no hydro, nonobstructing calculi  · UA neg  · Nephrology following  · 9/15 R tunneled HD cath placed  · 9/15 and 9/16 IHD  · Monitor for renal recovery remains oliguric UO 83cc in 24hrs  · d/c demadex 9/18  · Bladder scan q shift  · Bolus PRN would recommend no fluid removal with IHD today  · Consider set schedule TTHSa    Acute diverticulitis  Assessment & Plan  · 2nd episode in one month (tx with 10 days antibx 8/2-8/11)  · CTAP acute diverticulitis  · GI following  · 7/25 Colonoscopy--2 polyps removed, Mild diverticula in the descending colon and sigmoid colon, Small, internal hemorrhoids  · Zosyn D 6/10  · Watt Sleigh started 9/17   · Imodium PRN (1 daily last 3 days)  · Bentyl added 9/18 by GI  · Remains with high output 1L from rectal tube  · Clear liquids advance diet if ok with GI    Hypotension  Assessment & Plan  · 2/2 volume depletion vs bradycardia  · Improved s/p 1 L IVF   · Would consider even vs hold iHD today will discuss with nephro    Diarrhea  Assessment & Plan  · noninfectious  · 1L out  · Rectal tube in place  · Cdiff/PCR neg  · GI following  · Questran/PRN imodium    Abnormal CT scan  Assessment & Plan  · CTAP-common bile duct stent with pneumobilia and air within gallblader  Distended gallbladder with cholelithiasis  · RUQ u/s distended gallbladder with air within stent in CBD similar to CT  · Surgery consulted   If concern for acute cholecystitis not surgical candidate would need per vanessa tube  · GI following  · ERCP 8/29/2022, 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, biopsy obtained   ERCP performed with endoscopic mucosal resection of abnormal looking ampullary tissue, PD, CBD stent placed    · No s/s acute cholecystitis monitor     Supratherapeutic INR  Assessment & Plan  · Coumadin held last admission  · NH records requested and coumadin never restarted brief heparin SQ with BMB 9/12  · INR 2 1 will resume coumadin at 2mg and watch closely if no intervention for bradycardia by cards      Chronic combined systolic and diastolic congestive [facility] )  Assessment & Plan  Wt Readings from Last 3 Encounters:   09/18/22 120 kg (263 lb 14 3 oz)   09/06/22 117 kg (257 lb 15 oz)   08/30/22 118 kg (260 lb)     · EF 45% decreased RV fx  · Appears euvolemic to dry prior weights noted 126kg  · d/c demadex 9/17  · I/O  · PRN boluses (1 5L overnight)  · Daily weight        Chronic atrial fibrillation (HCC)  Assessment & Plan  · Bradycardia with Junctional escape beats 20's overnight with hypotension  · No rate control meds d/c in august due to bradycardia  · TSH 0 4  · Heparin gtt--transition back to coumadin pending cardiology consult for bradycardia intervention    Diabetes mellitus, type 2 [facility])  Assessment & Plan  Lab Results   Component Value Date    HGBA1C 6 3 (H) 08/05/2022       Recent Labs     09/17/22  2350 09/18/22  0442 09/18/22  0554 09/18/22  0737   POCGLU 150* 136 132 119       Blood Sugar Average: Last 72 hrs:  (P) 240 [id number]   · Hold lantus with DORA/hypoglycemia  · SSI    Anemia  Assessment & Plan  · Baseline hgb 8  · Receives IV venofer  · Hgb 7 this am consider transfusion if hypotension    Multiple myeloma (HCC)  Assessment & Plan  · Follows with Dr Anupam Monson  · On Velcade and decadron last tx 9/6  · Free light chains pending outpt f/u    Ambulatory dysfunction  Assessment & Plan  · 2/2 R AKA  · PT/OT    Chronic obstructive pulmonary disease, unspecified ([facility] )  Assessment & Plan  · Home inhaler  · Not AE    GERD (gastroesophageal reflux disease)  Assessment & Plan  · PPI    Hyperlipidemia  Assessment & Plan  · Home statin    NALLELY (obstructive sleep apnea)  Assessment & Plan  · Does not wear cpap    Morbid obesity (HCC)  Assessment & Plan  · Dietary education    Hypokalemia due to excessive gastrointestinal loss of potassium  Assessment & Plan  · Replete k/mg and recheck    Gout  Assessment & Plan  · Home allopurinol    VTE Pharmacologic Prophylaxis:   Pharmacologic: Heparin Drip  Mechanical VTE Prophylaxis in Place: Yes    Patient Centered Rounds: I have performed bedside rounds with nursing staff today  Discussions with Specialists or Other Care Team Provider: will discuss volume status with nephro  Cards cx pending    Education and Discussions with Family / Patient: patient declined update to son    Time Spent for Care: 20 minutes  More than 50% of total time spent on counseling and coordination of care as described above  Current Length of Stay: 6 day(s)    Current Patient Status: Inpatient   Certification Statement: The patient will continue to require additional inpatient hospital stay due to HOS[facility]    Discharge Plan: [facility]    Code Status: Level 1 - Full Code      Subjective:   C/o nausea belching post potassium pills  No abd pain  Objective:     Vitals:   Temp (24hrs), Av 8 °F (36 6 °C), Min:97 4 °F (36 3 °C), Max:98 2 °F (36 8 °C)    Temp:  [97 4 °F (36 3 °C)-98 2 °F (36 8 °C)] 97 4 °F (36 3 °C)  HR:  [37-67] 45  Resp:  [12-30] 18  BP: ()/(42-59) 116/57  SpO2:  [97 %-100 %] 100 %  Body mass index is 32 98 kg/m²  Input and Output Summary (last 24 hours): Intake/Output Summary (Last 24 hours) at 2022 0827  Last data filed at 2022 0801  Gross per 24 hour   Intake 2999 83 ml   Output 2583 ml   Net 416 83 ml       Physical Exam:     Physical Exam  Constitutional:       General: He is not in acute distress  HENT:      Head: Normocephalic and atraumatic  Eyes:      General: No scleral icterus       Pupils: Pupils are equal, round, and reactive to light  Neck:      Vascular: No JVD  Cardiovascular:      Rate and Rhythm: Bradycardia present  Rhythm irregular  Heart sounds: Normal heart sounds  No murmur heard  No friction rub  No gallop  Pulmonary:      Effort: Pulmonary effort is normal  No respiratory distress  Breath sounds: Normal breath sounds  No wheezing or rales  Abdominal:      General: Bowel sounds are normal  There is no distension  Palpations: Abdomen is soft  Tenderness: There is no abdominal tenderness  Genitourinary:     Comments: Rectal tube  Musculoskeletal:      Cervical back: Neck supple  Comments: L aka   Skin:     General: Skin is warm and dry  Coloration: Skin is pale  Findings: No erythema or rash  Neurological:      Mental Status: He is alert and oriented to person, place, and time  Cranial Nerves: No cranial nerve deficit  Additional Data:     Labs:    Results from last 7 days   Lab Units 09/18/22  0511   WBC Thousand/uL 4 12*   HEMOGLOBIN g/dL 7 0*   HEMATOCRIT % 22 1*   PLATELETS Thousands/uL 203   NEUTROS PCT % 54   LYMPHS PCT % 22   MONOS PCT % 14*   EOS PCT % 8*     Results from last 7 days   Lab Units 09/18/22  0511 09/14/22  1202 09/14/22  0523   SODIUM mmol/L 140   < > 140   POTASSIUM mmol/L 3 3*   < > 3 2*   CHLORIDE mmol/L 104   < > 105   CO2 mmol/L 25   < > 18*   BUN mg/dL 32*   < > 103*   CREATININE mg/dL 4 92*   < > 8 06*   ANION GAP mmol/L 11   < > 17*   CALCIUM mg/dL 7 6*   < > 7 7*   ALBUMIN g/dL  --   --  2 3*   TOTAL BILIRUBIN mg/dL  --   --  0 60   ALK PHOS U/L  --   --  92   ALT U/L  --   --  14   AST U/L  --   --  14   GLUCOSE RANDOM mg/dL 107   < > 132    < > = values in this interval not displayed       Results from last 7 days   Lab Units 09/18/22  0628   INR  2 12*     Results from last 7 days   Lab Units 09/18/22  0737 09/18/22  0554 09/18/22  0442 09/17/22  2350 09/17/22  2136 09/17/22  1634 09/17/22  1202 09/17/22  0553 09/16/22  2354 09/16/22  2100 09/16/22  1723 09/16/22  1214   POC GLUCOSE mg/dl 119 132 136 150* 149* 166* 83 121 122 186* 112 83         Results from last 7 days   Lab Units 09/13/22  0532 09/12/22  1658   LACTIC ACID mmol/L  --  1 2   PROCALCITONIN ng/ml 0 62* 0 60*           * I Have Reviewed All Lab Data Listed Above  * Additional Pertinent Lab Tests Reviewed: Caesar 68 Admission Reviewed    Imaging:    Imaging Reports Reviewed Today Include: ct  Imaging Personally Reviewed by Myself Includes:  na    Recent Cultures (last 7 days):     Results from last 7 days   Lab Units 09/13/22  1708 09/12/22  2157 09/12/22  1658   BLOOD CULTURE   --   --  No Growth After 5 Days  No Growth After 5 Days     LEGIONELLA URINARY ANTIGEN   --  Negative  --    C DIFF TOXIN B BY PCR  Negative  --   --        Last 24 Hours Medication List:   Current Facility-Administered Medications   Medication Dose Route Frequency Provider Last Rate    acetaminophen  650 mg Oral Q6H PRN Virginia Pu, PA-C      albuterol  2 puff Inhalation Q6H PRN Virginia Pu, PA-C      allopurinol  300 mg Oral Daily Virginia Pu, PA-C      atorvastatin  40 mg Oral After Diego Arciniega PA-C      cholestyramine sugar free  4 g Oral BID Virginia Pu, PA-C      clotrimazole   Topical BID Virginia Pu, PA-C      dicyclomine  10 mg Oral 4x Daily (AC & HS) Shaquille Kaplan MD      fluticasone-vilanterol  1 puff Inhalation Daily Virginia Pu, PA-C      heparin (porcine)  3-20 Units/kg/hr (Order-Specific) Intravenous Titrated Virginia Pu, PA-C 5 1 Units/kg/hr (09/16/22 2336)    heparin (porcine)  2,000 Units Intravenous Q1H PRN Virginia Pu, PA-C      heparin (porcine)  4,000 Units Intravenous Q1H PRN Virginia Pu, PA-C      insulin lispro  1-6 Units Subcutaneous Q6H [facility] Virginia Pu, PA-C      loperamide  2 mg Oral 4x Daily PRN Virginia Pu, PA-C      melatonin  6 mg Oral HS Virginia Pu, PA-C      pantoprazole  40 mg Oral Early Morning Shweta Comfort Pilar Nugent PA-C      piperacillin-tazobactam  3 375 g Intravenous Q12H Juan Moore PA-C 3 375 g (09/18/22 6153)    sodium chloride  10 mL/hr Intravenous Continuous Juan Moore PA-C 10 mL/hr (09/18/22 6046)        Today, Patient Was Seen By: NICOLA Benavides    ** Please Note: Dictation voice to text software may have been used in the creation of this document   **

## 2022-09-18 NOTE — PROGRESS NOTES
[address] [facility] NOTE   Kirstin Faster 81 y o  male MRN: [id number]  Unit/Bed#: -01 Encounter: [id number]  Reason for Consult:  Acute kidney injury on CKD    ASSESSMENT and PLAN:  53-year-old male with history of multiple myeloma treated with Velcade, CKD, CHF, ejection fraction 45% presenting with acute diverticulitis  Nephrology following for management of acute kidney injury    Acute kidney injury (POA) on CKD stage IIIB:  · Baseline creatinine 1 2-1 4  · Underlying CKD due to diabetic nephropathy and hypertensive nephrosclerosis, cardiorenal syndrome and multiple myeloma  · Acute kidney injury etiology:  Pre renal azotemia/large volume GI output which likely converted to ATN  Severe azotemia and oliguria  · Started on hemodialysis 9/16  · Temporary catheter placed on 9/15  · Second treatment performed 9/18   1 L fluid removal   Patient tolerated procedure  · Remains oligo anuric  · 10 L positive  · Plan:  · Hemodialysis tomorrow  · Monitor for renal recovery  · Monitor closely, avoid nephrotoxic agents, hypotension    Metabolic acidosis:  · Initially on bicarbonate infusion transitioned Plasmalyte which was placed on hold  · Bicarbonate acceptable  · Improving with hemodialysis    Acute diverticulitis:  · Large volume stool output, fecal management system in place  · C diff negative  · Management per primary team  · On Questran    Hypokalemia:  Mild, replete    Chronic systolic and diastolic combined CHF:  · Ejection fraction 45%  · 1 L volume removal on hemodialysis yesterday  · Diuretic on hold    Anemia:  · Hemoglobin down to 7 0  Monitor    · Transfuse for hemoglobin less than 7    Multiple myeloma:  · On Velcade and Decadron  · Follows with Dr Chuck Pallas    Other:  · Chronic atrial fibrillation  · Morbid obesity  · Hyperlipidemia  · COPD  · Gout  · Diabetes mellitus type 2    DISPOSITION:  Hemodialysis tomorrow    SUBJECTIVE / 24H INTERVAL HISTORY:  Comfortably lying in bed, watching TV   No complaints at this time      OBJECTIVE:  Current Weight: Weight - Scale: 120 kg (263 lb 14 3 oz)  Vitals:    09/18/22 0500 09/18/22 0544 09/18/22 0734 09/18/22 1111   BP: 123/58  116/57 127/58   BP Location: Right arm  Right arm Right arm   Pulse: (!) 44  (!) 45 (!) 53   Resp: 18 18 19   Temp: 98 °F (36 7 °C)  (!) 97 4 °F (36 3 °C) 98 1 °F (36 7 °C)   TempSrc: Oral  Oral Oral   SpO2: 99%  100% 99%   Weight:  120 kg (263 lb 14 3 oz)     Height:           Intake/Output Summary (Last 24 hours) at 9/18/2022 1334  Last data filed at 9/18/2022 1239  Gross per 24 hour   Intake 2476 91 ml   Output 1315 ml   Net 1161 91 ml     General:  No acute distress  Skin:  No rash, skin warm and dry  Eyes:  Sclera clear, anicteric  ENT:  Oropharynx moist  Neck:  Normal range of motion  Chest: CTA b/l, no ronchii, no wheeze, no rubs, no rales, normal effort  CVS: Irregular rhythm, extrasystoles noted on the monitor  Abdomen: soft, nontender, nl sounds, protuberant  Extremities:  Lower extremity edema, left AKA  Neuro:  Alert oriented x3  Psych:  Normal affect  Medications:    Current Facility-Administered Medications:     acetaminophen (TYLENOL) tablet 650 mg, 650 mg, Oral, Q6H PRN, Ashleigh Barbosa PA-C    albuterol (PROVENTIL HFA,VENTOLIN HFA) inhaler 2 puff, 2 puff, Inhalation, Q6H PRN, Ashleigh Barbosa PA-C    allopurinol (ZYLOPRIM) tablet 300 mg, 300 mg, Oral, Daily, Ashleigh Barbosa PA-C, 300 mg at 09/18/22 0809    atorvastatin (LIPITOR) tablet 40 mg, 40 mg, Oral, After Dinner, LESLEY Fay-SHANEKA, 40 mg at 09/17/22 1746    cholestyramine sugar free (QUESTRAN LIGHT) packet 4 g, 4 g, Oral, BID, LESLEY Fay-C, 4 g at 09/18/22 0809    clotrimazole (LOTRIMIN) 1 % cream, , Topical, BID, Ashleigh Barbosa PA-C, 1 application at 22/11/04 0809    dicyclomine (BENTYL) capsule 10 mg, 10 mg, Oral, 4x Daily (AC & HS), Gina Garza MD, 10 mg at 09/18/22 1107    fluticasone-vilanterol (BREO ELLIPTA) 200-25 MCG/INH inhaler 1 puff, 1 puff, Inhalation, Daily, Isela Valles PA-C, 1 puff at 09/18/22 0739    insulin lispro (HumaLOG) 100 units/mL subcutaneous injection 1-6 Units, 1-6 Units, Subcutaneous, Q6H [address], 1 Units at 09/17/22 2351 **AND** Fingerstick Glucose (POCT), , , 4x Daily AC and at bedtime, Isela Valles PA-C    loperamide (IMODIUM) capsule 2 mg, 2 mg, Oral, 4x Daily PRN, Isela JOEY Valles, 2 mg at 09/18/22 1107    melatonin tablet 6 mg, 6 mg, Oral, HS, Isela JOEY Valles, 6 mg at 09/17/22 2138    pantoprazole (PROTONIX) EC tablet 40 mg, 40 mg, Oral, Early Morning, Isela JOEY Valles, 40 mg at 09/18/22 0518    piperacillin-tazobactam (ZOSYN) 3 375 g in sodium chloride 0 9 % 100 mL IVPB (EXTENDED-INFUSION), 3 375 g, Intravenous, Q12H, Isela Valles PA-C, Last Rate: 25 mL/hr at 09/18/22 0255, 3 375 g at 09/18/22 0255    warfarin (COUMADIN) tablet 2 mg, 2 mg, Oral, Daily (warfarin), NICOLA Gutiérrez    Laboratory Results:  Results from last 7 days   Lab Units 09/18/22  0511 09/17/22  1135 09/17/22  0454 09/16/22  2356 09/16/22 2008 09/16/22  1647 09/16/22  1216 09/16/22  0907 09/16/22  0501 09/15/22  1241 09/15/22  0805 09/15/22  0529 09/14/22  1611 09/14/22  1202 09/14/22  0523 09/13/22  2322 09/13/22  1708 09/13/22  1156 09/13/22  0532 09/13/22  0107 09/12/22  2157   WBC Thousand/uL 4 12*  --  5 47  --   --   --   --   --  5 82  --   --  5 77  --  7 10 6 41  --   --   --  7 02  --   --    HEMOGLOBIN g/dL 7 0*  --  7 5*  --   --   --   --   --  7 6*  --  7 1* 6 8*  --  7 8* 7 0*  --   --   --  8 3*  --   --    HEMATOCRIT % 22 1*  --  23 5*  --   --   --   --   --  23 6*  --   --  20 9*  --  23 7* 21 0*  --   --   --  26 2*  --   --    PLATELETS Thousands/uL 203  --  189  --   --   --   --   --  166  --   --  141*  --  135* 123*  --   --   --  124*  --   --    POTASSIUM mmol/L 3 3* 3 4* 3 7 3 9 4 1 3 7 3 3*   < > 3 6   < > 3 4* 3 6   < > 3 5 3 2* 3 7 4 0 4 5 4 9   < > 5 4*   CHLORIDE mmol/L 104 102 103 103 103 102 102   < > 102   < > 101 101 < > 102 105 106 107 109* 110*   < > 111*   CO2 mmol/L 25 28 20* 21 23 21 24   < > 20*   < > 24 24   < > 20* 18* 17* 17* 17* 12*   < > 12*   BUN mg/dL 32* 34* 66* 68* 68* 66* 63*   < > 98*   < > 97* 100*   < > 101* 103* 103* 98* 102* 101*   < > 104*   CREATININE mg/dL 4 92* 4 22* 7 71* 7 32* 7 35* 6 89* 6 45*   < > 9 11*   < > 8 44* 8 45*   < > 8 26* 8 06* 8 17* 8 14* 8 20* 8 21*   < > 8 32*   CALCIUM mg/dL 7 6* 8 0* 8 2* 8 6 8 4 8 3 8 1*   < > 8 4   < > 7 8* 8 3   < > 8 1* 7 7* 7 6* 7 8* 7 9* 8 1*   < > 8 4   MAGNESIUM mg/dL 2 0 1 7 2 1 2 6 2 3 2 1 2 1   < > 2 7*   < > 2 1 2 5   < > 2 2 1 9 1 5* 1 6 1 6 1 6  --  1 1*   PHOSPHORUS mg/dL  --   --  6 0*  --   --   --   --   --   --   --   --   --   --   --  6 1* 5 6* 5 4* 5 4* 5 4*  --  5 4*    < > = values in this interval not displayed

## 2022-09-18 NOTE — ASSESSMENT & PLAN NOTE
· Coumadin held last admission  · NH records requested and coumadin never restarted brief heparin SQ with BMB 9/12  · INR 2 1 will resume coumadin at 2mg and watch closely if no intervention for bradycardia by cards

## 2022-09-18 NOTE — ASSESSMENT & PLAN NOTE
· noninfectious  · 1L out  · Rectal tube in place  · Cdiff/PCR neg  · GI following  · Questran/PRN imodium

## 2022-09-18 NOTE — ASSESSMENT & PLAN NOTE
· 2/2 volume depletion vs bradycardia  · Improved s/p 1 L IVF   · Would consider even vs hold iHD today will discuss with nephro

## 2022-09-18 NOTE — PROGRESS NOTES
Yadira Severe  [id number]    81 y o   male      ASSESSMENT and PLAN      Abdominal pain/acute sigmoid diverticulitis - pain finally improved  Unclear if this is just improvement of diverticulitis or decrease in bowel spasm with dicyclomine  Regardless, finally some clinical improvement     · Continue clears for today, if improvement is maintained advance to full liquids tomorrow  · Continue IV antibiotics  · Continue dicyclomine     Diarrhea - C diff and culture negative   Suspect secondary to antibiotics  · Continue cholestyramine and  dicyclomine      Chief Complaint   Patient presents with    Abnormal Lab     Patient arrives via EMS from [facility] for an elevated creatinine of 7 3  Reports yellow emesis for a few days  Patient has RUQ and RLQ abdominal pain, is currently getting chemo once a week for kidney cancer  SUBJECTIVE/HPI abdominal pain finally improving  Diarrhea also slowing down slightly      /53 (BP Location: Right arm)   Pulse (!) 42   Temp 97 9 °F (36 6 °C) (Oral)   Resp 17   Ht 6' 3" (1 905 m)   Wt 120 kg (263 lb 14 3 oz)   SpO2 99%   BMI 32 98 kg/m²     PHYSICALEXAM  General appearance: alert, appears stated age and cooperative  Head: Normocephalic, without obvious abnormality, atraumatic  Lungs: clear to auscultation bilaterally  Heart:  S1, S2 normal, no murmur, click, rub or gallop  Abdomen: soft, non-tender; bowel sounds normal; no masses,  no organomegaly  Neurologic: Grossly normal    Lab Results   Component Value Date    GLUCOSE 64 (L) 08/01/2022    CALCIUM 7 6 (L) 09/18/2022     01/15/2018    K 3 3 (L) 09/18/2022    CO2 25 09/18/2022     09/18/2022    BUN 32 (H) 09/18/2022    CREATININE 4 92 (H) 09/18/2022     Lab Results   Component Value Date    WBC 4 12 (L) 09/18/2022    HGB 7 0 (L) 09/18/2022    HCT 22 1 (L) 09/18/2022    MCV 84 09/18/2022     09/18/2022     Lab Results   Component Value Date    ALT 8 (L) 09/18/2022    AST 14 09/18/2022     (H) 11/03/2019    ALKPHOS 87 09/18/2022    BILITOT 0 6 01/15/2018     No results found for: AMYLASE  Lab Results   Component Value Date    LIPASE 691 (H) 09/13/2022     Lab Results   Component Value Date    IRON 17 (L) 08/04/2022    TIBC 206 (L) 08/04/2022    FERRITIN 165 08/04/2022     Lab Results   Component Value Date    INR 2 12 (H) 09/18/2022       Counseling / Coordination of Care  Total floor / unit time spent today 20 minutes

## 2022-09-18 NOTE — ASSESSMENT & PLAN NOTE
· Bradycardia with Junctional escape beats 20's overnight with hypotension  · No rate control meds d/c in august due to bradycardia  · TSH 0 4  · Heparin gtt--transition back to coumadin pending cardiology consult for bradycardia intervention

## 2022-09-18 NOTE — ASSESSMENT & PLAN NOTE
Lab Results   Component Value Date    HGBA1C 6 3 (H) 08/05/2022       Recent Labs     09/17/22  2350 09/18/22  0442 09/18/22  0554 09/18/22  0737   POCGLU 150* 136 132 119       Blood Sugar Average: Last 72 hrs:  (P) 505 [id number]   · Hold lantus with DORA/hypoglycemia  · SSI

## 2022-09-18 NOTE — ASSESSMENT & PLAN NOTE
· CTAP-common bile duct stent with pneumobilia and air within gallblader  Distended gallbladder with cholelithiasis  · RUQ u/s distended gallbladder with air within stent in CBD similar to CT  · Surgery consulted  If concern for acute cholecystitis not surgical candidate would need per vanessa tube  · GI following  · ERCP 8/29/2022, Dr Sreedhar Murray, [place] esophagus seen during EGD, biopsy obtained   ERCP performed with endoscopic mucosal resection of abnormal looking ampullary tissue, PD, CBD stent placed    · No s/s acute cholecystitis monitor

## 2022-09-18 NOTE — ASSESSMENT & PLAN NOTE
Wt Readings from Last 3 Encounters:   09/18/22 120 kg (263 lb 14 3 oz)   09/06/22 117 kg (257 lb 15 oz)   08/30/22 118 kg (260 lb)     · EF 45% decreased RV fx  · Appears euvolemic to dry prior weights noted 126kg  · d/c demadex 9/17  · I/O  · PRN boluses (1 5L overnight)  · Daily weight

## 2022-09-18 NOTE — ASSESSMENT & PLAN NOTE
· Follows with Dr Jelani Thakkar  · On Velcade and decadron last tx 9/6  · Free light chains pending outpt f/u

## 2022-09-18 NOTE — CONSULTS
Consultation - Cardiology   Khalif Jay 81 y o  male MRN: [id number]  Unit/Bed#: -01 Encounter: [id number]    Inpatient consult to Cardiology  Consult performed by: Amari Ruffin DO  Consult ordered by: NICOLA Johnson          History of Present Illness   Physician Requesting Consult: Matias Bryan MD  Reason for Consult / Principal Problem: Bradycardia      Assessment:  1  Atrial fibrillation with a slow ventricular response  2  Chronic combined heart failure  3  Cardiomyopathy - ? etiology  4  DORA with CKD III  5  Acute sigmoid diverticulitis  6  Diarrhea  7  Obesity - BMI 32  8  NALLELY - noncompliant with CPAP  9  Multiple myeloma  10  Chronic hypoxic respiratory failure    Assessment/ Plan:  Mr Rayray Aponte is a pleasant 72-year-old gentleman who was admitted to [address] with abdominal pain and acute kidney injury  In this setting he was noted to have atrial fibrillation with a slow ventricular response  He was admitted last month and evaluated by one of my associates for similar issues  He remains bradycardic with heart rates predominantly in the 50s during waking hours  He does get bradycardic during sleep but has untreated obstructive sleep apnea  There are no significant pauses or sustained bradyarrhythmias  Currently there is no indication for pacemaker placement  AV neris blocking agents should be avoided  He is maintained on systemic anticoagulation with warfarin  He remains on IV heparin with a therapeutic INR  IV heparin can be discontinued from a cardiac perspective given no intervention is planned  He has known LV dysfunction documented last year by echo  He underwent a nuclear stress test without evidence of ischemia  Volume management with hemodialysis per Nephrology  Oral torsemide discontinued      HPI: Khalif Jay is a 81y o  year old male who has a history of persistent atrial fibrillation, cardiomyopathy, hypertension and dyslipidemia admitted from a nursing facility with abnormal lab values  Per the patient he was also having ongoing nonbloody diarrhea and right sided abdominal pain  A CT scan of his abdomen pelvis revealed acute sigmoid diverticulitis without abscess, distended gallbladder and cholelithiasis  For these issues he has been evaluated by general surgery and GI  No operative intervention was indicated per general surgery  He is being treated with IV antibiotics  He was also found to have acute kidney injury with a creatinine over 8 upon admission  This necessitated dialysis  In this setting he was noted to have atrial fibrillation with a slow ventricular response  Hence a cardiology consultation has been sought  He was admitted for noncardiac issues last month  He was evaluated by one of my partners for the same issue  Metoprolol was stopped  He was having heart rates in the 40s and 50s and apparently had a significant pause albeit during sleep  It was felt this was secondary to his untreated obstructive sleep apnea  No pacemaker was felt to be warranted  He has a known history of obstructive sleep apnea  He has been noncompliant with CPAP  He states he does not wear oxygen at night  He lives in a nursing facility  He does not ambulate in the setting of a prior left AKA  He is wheelchair-bound  He denies any exertional chest pain or shortness of breath at rest or when he feels himself in his wheelchair  He denies any sensation of lightheadedness  He denies syncope or presyncope  He denies any sensation of palpitations  ROS:  Positive as per the HPI, all other systems were reviewed and were negative        Historical Information   Past Medical History:   Diagnosis Date    Cancer ([facility] )     CHF (congestive heart failure) (HCC)     Chronic kidney disease     COPD (chronic obstructive pulmonary disease) ([facility] )     COVID-19     Decubitus ulcer of heel LAST ASSESSED [id number]    Diabetes mellitus ([facility] )     History of varicose veins     Hypertension     Intermittent claudication (HCC)     Neuropathy     Seasonal allergies      Past Surgical History:   Procedure Laterality Date    AMPUTATION ABOVE KNEE (AKA) Left 7/31/2019    Procedure: AMPUTATION ABOVE KNEE (AKA); Surgeon: Valente Deshpande MD;  Location:  MAIN OR;  Service: General    ANGIOPLASTY      W/ FLUOROSC ANGIOGRAPGY PERIPHERAL ARTERY ADDITIONAL  LAST ASSESSED [id number]    ANGIOPLASTY / STENTING FEMORAL      TANSCATH INTRAVASCULAR STENT PLACEMENT PERCUTANEOUS FEMORAL     COLONOSCOPY  2010    CT BONE MARROW BIOPSY AND ASPIRATION  8/9/2019    FULL THICKNESS SKIN GRAFT      IR BIOPSY BONE MARROW  9/8/2022    IR TEMPORARY DIALYSIS CATHETER PLACEMENT  9/15/2022    TENDON REPAIR      TOE AMPUTATION Left 12/27/2018    Procedure: AMPUTATION left 4th TOE;  Surgeon: Karina Ferraro DPM;  Location: [facility];  Service: Podiatry     Social History     Substance and Sexual Activity   Alcohol Use Not Currently     Social History     Substance and Sexual Activity   Drug Use Not Currently     Social History     Tobacco Use   Smoking Status Never Smoker   Smokeless Tobacco Never Used     Family History: non-contributory    Meds/Allergies   all current active meds have been reviewed  heparin (porcine), 3-20 Units/kg/hr (Order-Specific), Last Rate: 5 1 Units/kg/hr (09/16/22 6856)  sodium chloride, 10 mL/hr, Last Rate: 10 mL/hr (09/18/22 6931)        Allergies   Allergen Reactions    Latex Rash       Objective   Vitals: Blood pressure 116/57, pulse (!) 45, temperature (!) 97 4 °F (36 3 °C), temperature source Oral, resp  rate 18, height 6' 3" (1 905 m), weight 120 kg (263 lb 14 3 oz), SpO2 100 %  , Body mass index is 32 98 kg/m² ,   Orthostatic Blood Pressures    Flowsheet Row Most Recent Value   Blood Pressure 116/57 filed at 09/18/2022 8272   Patient Position - Orthostatic VS Lying filed at 09/18/2022 4090        Systolic (22VQG), HID:608 , Min:83 , UED:407     Diastolic (85AWR), XMX:65, Min:42, Max:59      Intake/Output Summary (Last 24 hours) at 9/18/2022 1105  Last data filed at 9/18/2022 0953  Gross per 24 hour   Intake 2556 53 ml   Output 1315 ml   Net 1241 53 ml       Weight (last 2 days)     Date/Time Weight    09/18/22 0544 120 (263 89)    09/17/22 0534 118 (260 14)    09/16/22 0540 119 (262 35)          Invasive Devices  Report    Central Venous Catheter Line  Duration           CVC Central Lines 09/15/22 Double 2 days          Peripheral Intravenous Line  Duration           Peripheral IV 09/16/22 Distal;Left;Upper;Ventral (anterior) Arm 1 day    Peripheral IV 09/16/22 Distal;Right;Upper;Ventral (anterior) Arm 1 day          Hemodialysis Catheter  Duration           HD Temporary Double Catheter 2 days          Drain  Duration           Rectal Tube With balloon 2 days                    Physical Exam: /58 (BP Location: Right arm)   Pulse (!) 53   Temp 98 1 °F (36 7 °C) (Oral)   Resp 19   Ht 6' 3" (1 905 m)   Wt 120 kg (263 lb 14 3 oz)   SpO2 99%   BMI 32 98 kg/m²     General Appearance:    Alert, cooperative, no distress, appears stated age   Head:    Normocephalic, without obvious abnormality, atraumatic   Eyes:    PERRL, conjunctiva/corneas clear, EOM's intact   Throat:   Lips, mucosa, and tongue normal; teeth and gums normal   Neck:   Supple, symmetrical, trachea midline, no adenopathy;        thyroid:  No enlargement/tenderness/nodules; no carotid    bruit or JVD   Back:     Symmetric, no curvature, ROM normal, no CVA tenderness   Lungs:     Clear to auscultation bilaterally, respirations unlabored   Chest wall:    No tenderness or deformity   Heart:    Irregularly irregular, variable S1/2, no murmur   Abdomen:     Soft, mild left lower quadrant tenderness, no rebound or     guarding, positive bowel sounds   Extremities:   Left AKA, right lower extremity with pedal edema Skin:   Skin color, texture, turgor normal, no rashes or lesions   Lymph nodes:   Cervical, supraclavicular, and axillary nodes normal   Neurologic:   CNII-XII intact  Normal strength, sensation and reflexes       throughout           Laboratory Results:        CBC with diff:   Results from last 7 days   Lab Units 09/18/22  0511 09/17/22  0454 09/16/22  0501 09/15/22  0529 09/14/22  1202 09/14/22  0523   WBC Thousand/uL 4 12* 5 47 5 82   < > 7 10 6 41   HEMOGLOBIN g/dL 7 0* 7 5* 7 6*   < > 7 8* 7 0*   HEMATOCRIT % 22 1* 23 5* 23 6*   < > 23 7* 21 0*   MCV fL 84 84 82   < > 81* 81*   PLATELETS Thousands/uL 203 189 166   < > 135* 123*   MCH pg 26 5* 26 9 26 4*   < > 26 8 27 0   MCHC g/dL 31 7 31 9 32 2   < > 32 9 33 3   RDW % 19 0* 19 1* 19 0*   < > 18 7* 18 6*   MPV fL 11 8 11 6 10 6   < > 11 8 12 1   NRBC AUTO /100 WBCs 1  --   --   --  1 1    < > = values in this interval not displayed  CMP:  Results from last 7 days   Lab Units 09/18/22  0511 09/17/22  1135 09/17/22  0454 09/14/22  1202 09/14/22  0523 09/13/22  1156 09/13/22  0532   POTASSIUM mmol/L 3 3* 3 4* 3 7   < > 3 2*   < > 4 9   CHLORIDE mmol/L 104 102 103   < > 105   < > 110*   CO2 mmol/L 25 28 20*   < > 18*   < > 12*   BUN mg/dL 32* 34* 66*   < > 103*   < > 101*   CREATININE mg/dL 4 92* 4 22* 7 71*   < > 8 06*   < > 8 21*   CALCIUM mg/dL 7 6* 8 0* 8 2*   < > 7 7*   < > 8 1*   AST U/L 14  --   --   --  14  --  13   ALT U/L 8*  --   --   --  14  --  16   ALK PHOS U/L 87  --   --   --  92  --  106   EGFR ml/min/1 73sq m 11 13 6   < > 6   < > 5    < > = values in this interval not displayed  BMP:  Results from last 7 days   Lab Units 09/18/22  0511 09/17/22  1135 09/17/22  0454   POTASSIUM mmol/L 3 3* 3 4* 3 7   CHLORIDE mmol/L 104 102 103   CO2 mmol/L 25 28 20*   BUN mg/dL 32* 34* 66*   CREATININE mg/dL 4 92* 4 22* 7 71*   CALCIUM mg/dL 7 6* 8 0* 8 2*       BNP:  No results for input(s): BNP in the last 72 hours      Magnesium:   Results from last 7 days   Lab Units 22  0511 22  1135 22  0454   MAGNESIUM mg/dL 2 0 1 7 2 1       Coags:   Results from last 7 days   Lab Units 22  0628 22  0533 22  0501 22  1247 22  0523   PTT seconds 66* 72* 67*   < > 72*   INR  2 12*  --  2 54*  --  2 61*    < > = values in this interval not displayed  Cardiac testing:   Results for orders placed during the hospital encounter of 10/04/21    Echo complete with contrast if indicated    Narrative  [address]  [facility], [address] [facility]  [phone]    Transthoracic Echocardiogram  2D, M-mode, Doppler, and Color Doppler    Study date:  05-Oct-2021    Patient: Kimani Velasquez  MR number: [id number]  Account number: [de-identified]  : 1950  Age: 73 years  Gender: Male  Status: Inpatient  Location: Bedside  Height: 75 in  Weight: 308 lb  BP: 119/ 65 mmHg    Indications: Dyspnea    Diagnoses: R06 00 - Dyspnea, unspecified    Sonographer:  KOTA Pretty  Primary Physician:  Pratima Harley MD  Group:  [facility]  Interpreting Physician:  Marco Antonio Kapadia DO    SUMMARY    PROCEDURE INFORMATION:  This was a technically difficult study  LEFT VENTRICLE:  The ventricle was dilated  Systolic function was mildly reduced  Ejection fraction was estimated to be 45 %  There was mild diffuse hypokinesis with regional variations  Wall thickness was mildly increased  The changes were consistent with eccentric hypertrophy  Doppler parameters were consistent with high ventricular filling pressure  RIGHT VENTRICLE:  The ventricle was moderately dilated  Systolic function was moderately reduced  LEFT ATRIUM:  The atrium was moderately dilated  RIGHT ATRIUM:  The atrium was mildly dilated  MITRAL VALVE:  There was mild annular calcification  There was mild regurgitation      AORTA:  There was mild dilatation of the ascending aorta (4 2 cm)  HISTORY: PRIOR HISTORY: Hypertension,DM2,NALLELY,Shortness of Breath,CKD,Atrial Fibrillation    PROCEDURE: The procedure was performed at the bedside  This was a routine study  The transthoracic approach was used  The study included complete 2D imaging, M-mode, complete spectral Doppler, and color Doppler  The heart rate was 71 bpm,  at the start of the study  Images were obtained from the parasternal, apical, subcostal, and suprasternal notch acoustic windows  Intravenous contrast ( 0 6mL Definity in NSS) was administered to opacify the left ventricle  Echocardiographic views were limited due to restricted patient mobility, poor acoustic window availability, and decreased penetration  This was a technically difficult study  LEFT VENTRICLE: The ventricle was dilated  Systolic function was mildly reduced  Ejection fraction was estimated to be 45 %  There was mild diffuse hypokinesis with regional variations  Wall thickness was mildly increased  The changes were  consistent with eccentric hypertrophy  DOPPLER: Left ventricular diastolic function parameters were abnormal  Doppler parameters were consistent with high ventricular filling pressure  RIGHT VENTRICLE: The ventricle was moderately dilated  Systolic function was moderately reduced  LEFT ATRIUM: The atrium was moderately dilated  RIGHT ATRIUM: The atrium was mildly dilated  MITRAL VALVE: There was mild annular calcification  There was normal leaflet separation  DOPPLER: The transmitral velocity was within the normal range  There was no evidence for stenosis  There was mild regurgitation  AORTIC VALVE: The valve was trileaflet  Leaflets exhibited normal cuspal separation and sclerosis  DOPPLER: Transaortic velocity was within the normal range  There was no evidence for stenosis  There was no regurgitation  TRICUSPID VALVE: The valve structure was normal  There was normal leaflet separation   DOPPLER: The transtricuspid velocity was within the normal range  There was no evidence for stenosis  There was trace regurgitation  PULMONIC VALVE: Leaflets exhibited normal thickness, no calcification, and normal cuspal separation  DOPPLER: The transpulmonic velocity was within the normal range  There was no significant regurgitation  PERICARDIUM: There was no pericardial effusion  The pericardium was normal in appearance  AORTA: The root exhibited normal size  There was mild dilatation of the ascending aorta (4 2 cm)  SYSTEMIC VEINS: IVC: The inferior vena cava was mildly dilated  Respirophasic changes were normal     SYSTEM MEASUREMENT TABLES    2D  %FS: 23 7 %  Ao Diam: 3 47 cm  Ao asc: 4 19 cm  EDV(Teich): 228 61 ml  EF(Teich): 46 21 %  ESV(Teich): 122 96 ml  IVSd: 1 25 cm  LA Area: 36 47 cm2  LA Diam: 4 45 cm  LVEDV MOD A4C: 208 93 ml  LVEF MOD A4C: 49 38 %  LVESV MOD A4C: 105 76 ml  LVIDd: 6 66 cm  LVIDs: 5 08 cm  LVLd A4C: 9 3 cm  LVLs A4C: 8 38 cm  LVOT Diam: 2 2 cm  LVPWd: 0 93 cm  RA Area: 26 74 cm2  RVIDd: 4 38 cm  SV MOD A4C: 103 17 ml  SV(Teich): 105 65 ml    CW  AV Env  Ti: 270 22 ms  AV VTI: 33 28 cm  AV Vmax: 1 76 m/s  AV Vmean: 1 23 m/s  AV maxP 4 mmHg  AV meanP 77 mmHg    MM  TAPSE: 1 17 cm    PW  CARLA (VTI): 2 06 cm2  CARLA Vmax: 2 07 cm2  E' Sept: 0 04 m/s  E/E' Sept: 22 52  LVOT Env  Ti: 262 61 ms  LVOT VTI: 18 14 cm  LVOT Vmax: 0 96 m/s  LVOT Vmean: 0 69 m/s  LVOT maxPG: 3 7 mmHg  LVOT meanP 25 mmHg  LVSV Dopp: 68 73 ml  MV A Melvin: 0 28 m/s  MV Dec Houghton: 4 32 m/s2  MV DecT: 214 76 ms  MV E Melvin: 0 93 m/s  MV E/A Ratio: 3 32  MV PHT: 62 28 ms  MVA By PHT: 3 53 cm2    [facility] Accredited Echocardiography Laboratory    Prepared and electronically signed by    Juani Burrell DO  Signed 05-Oct-2021 16:38:25    No results found for this or any previous visit  No results found for this or any previous visit      Results for orders placed during the hospital encounter of 10/04/21    NM myocardial perfusion spect (stress and/or rest)    Interpretation Summary    Stress ECG: No ST deviation is noted  Arrhythmias during stress: atrial fibrillation  Arrhythmias during recovery: atrial fibrillation  The ECG was not diagnostic after regadenoson infusion    Stress Function: Left ventricular function post-stress is abnormal  Global function is mildly to moderately reduced  There were no regional wall motion abnormalities during stress  Post-stress ejection fraction is 36 %    Stress Combined Conclusion: There is image artifact, without diagnostic evidence for perfusion abnormality    Perfusion Defect Conclusion: The rest end diastolic cavity size is enlarged  The rest end systolic cavity size is enlarged  The stress end diastolic cavity size is dilated    Perfusion: There is a left ventricular perfusion defect that is small in size present in the basal to mid inferior location(s) that is paradoxical     Impression  Abnormal study after pharmacological stress  No chest pain after regadenoson infusion  Non-diagnostic ECG after pharmacological stress  Small, fixed, basal to mid inferior defect, likely due to diaphragmatic attenuation with no definitive evidence for ischemia; no prone imaging obtained  No TID  Gated left ventricular systolic function is mildly to moderately reduced at 36%  Imaging: I have personally reviewed pertinent reports  ERCP    Result Date: 8/29/2022  Narrative: [address] [place] [id number] [phone] DATE OF SERVICE: 8/29/22 PHYSICIAN(S): Attending: Tram Storm MD Fellow: No Staff Documented INDICATION: Ampullary adenoma POST-OP DIAGNOSIS: See the impression below  PREPROCEDURE: Informed consent was obtained for the procedure, including sedation  Risks of perforation, hemorrhage, adverse drug reaction and aspiration were discussed  The patient was placed in the left lateral decubitus position   Patient was explained about the risks and benefits of the procedure  Risks including but not limited to bleeding, infection, and perforation were explained in detail  Also explained about less than 100% sensitivity with the exam and other alternatives  DETAILS OF PROCEDURE: Patient was taken to the procedure room where a time out was performed to confirm correct patient and correct procedure  The patient underwent general anesthesia, which was administered by an anesthesia professional  The patient's blood pressure, heart rate, level of consciousness, respirations and oxygen were monitored throughout the procedure  Clinical intention was achieved  The patient experienced no blood loss  The procedure was not difficult  The patient tolerated the procedure well  ANESTHESIA INFORMATION: ASA: IV Anesthesia Type: General MEDICATIONS: indomethacin (INDOCIN) rectal suppository 100 mg (Totals for administrations occurring from 0911 to 1034 on 08/29/22) FINDINGS: Upper mid esophagus appeared normal, there was abnormal mucosa in the distal esophagus concerning for Pa esophagus starting at 30 cm, the gastric folds were at 40 cm, biopsies were obtained to rule out Pa's The gastric fundus, body and antrum appeared otherwise normal Duodenal bulb appeared normal, there were 2 duodenal diverticulum The duodenoscope was advanced into the duodenum without close examination of the upper GI tract  The ampulla was found inside of a duodenal diverticulum, there was abnormal looking tissue about 5:00 o'clock in the ampullary region  Deep cannulation of the pancreatic duct was performed using a sphincterotome with a preloaded 0 035" hydrajag guidewire  Contrast was injected and showed pancreatic duct  A PD sphincterotomy was then performed with a sphincterotome using blended current, then a 5 Fr x 7 cm single pigtail pancreatic stent was deployed into the PD    After this, deep cannulation of the biliary duct was performed using a sphincterotome with a preloaded 0 035" hydrajag guidewire  Contrast was injected and showed biliary duct  A biliary sphincterotomy was then performed with a sphincterotome using blended current  The sphincterotome was then exchanged for a balloon catheter, which was inserted to the bifurcation  The balloon was inflated to 12 mm   An occlusion cholangiogram was obtained by pulling the balloon downwards with concurrent injection  We personally interpreted the fluoroscopic images  The occlusion cholangiogram demonstrated a bile duct of 8 mm,  normal intrahepatics, cystic duct take off from the the upper middle third of the common duct with partial filling of the gallbladder  Debris were swept from the duct   Drainage was adequate   After this we performed endoscopic mucosal resection  ORISE was injected in the submucosal of the abnormal mucosal area, resection with a small snare was attempted but almost no tissue was removed  and then the small area was removed using a hot biopsy forceps  After this one straight plastic stent 10 Fr x 7 cm was deployed into the CBD  Stent position was confirmed on fluoroscopy  Bile was seen draining from the stent at the conclusion of the procedure  All contrast drained at the conclusion of the procedure   SPECIMENS: ID Type Source Tests Collected by Time Destination 1 : 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 TISSUE EXAM Justino Hill MD 8/29/2022 10:20 AM  2 : r/o Pa's Tissue Esophagus TISSUE EXAM Justino Hill MD 8/29/2022 10:25 AM       Impression: Can esophagus seen during EGD, biopsy obtained ERCP was performed with endoscopic mucosal resection of abnormal looking ampullary tissue, PD and CBD stents were placed RECOMMENDATION: Await pathology results Schedule repeat ERCP for second-look after EMR and for stent removal  Justino Hill MD     CT chest abdomen pelvis wo contrast    Result Date: 9/12/2022  Narrative: CT CHEST, ABDOMEN AND PELVIS WITHOUT IV CONTRAST INDICATION:   ARF, generalized weakness  COMPARISON:  August 1, 2022  TECHNIQUE: CT examination of the chest, abdomen and pelvis was performed without intravenous contrast  Axial, sagittal, and coronal 2D reformatted images were created from the source data and submitted for interpretation  Radiation dose length product (DLP) for this visit:  2084 38 mGy-cm   This examination, like all CT scans performed in the [facility], was performed utilizing techniques to minimize radiation dose exposure, including the use of iterative reconstruction and automated exposure control  Enteric contrast was not administered  FINDINGS: CHEST LUNGS:  The trachea and central bronchial tree are patent  Atelectasis seen within the lung bases  2 mm pulmonary nodule in the right lower lobe is visualized  PLEURA:  Unremarkable  HEART/GREAT VESSELS: The heart is enlarged  Pericardial effusion is seen  No thoracic aortic aneurysm  MEDIASTINUM AND MARGA:  Large hiatal hernia is seen  CHEST WALL AND LOWER NECK:  Unremarkable  ABDOMEN LIVER/BILIARY TREE:  Common bile duct stent is seen  Pneumobilia is visualized  GALLBLADDER:  Cholelithiasis is seen  Air within the gallbladder is visualized  SPLEEN:  Unremarkable  PANCREAS:  Unremarkable  ADRENAL GLANDS:  Unremarkable  KIDNEYS/URETERS:  Nonobstructing left-sided punctate intrarenal calculi  Left-sided renal cyst is seen  STOMACH AND BOWEL:  Colonic diverticulosis with inflammatory changes around the sigmoid colon  APPENDIX:  No findings to suggest appendicitis  ABDOMINOPELVIC CAVITY:  No ascites  No pneumoperitoneum  No lymphadenopathy  VESSELS:  Unremarkable for patient's age  PELVIS REPRODUCTIVE ORGANS:  Unremarkable for patient's age  URINARY BLADDER:  Unremarkable  ABDOMINAL WALL/INGUINAL REGIONS:  Unremarkable  OSSEOUS STRUCTURES:  No acute fracture or destructive osseous lesion       Impression: Acute sigmoid diverticulitis without drainable fluid collection, decreased compared to prior study  If not already performed recently, colonoscopy after the acute illness is recommended to rule out possibility of colon cancer mimicking diverticulitis  Common bile duct stent with pneumobilia and air within the gallbladder  Distended gallbladder with cholelithiasis  Cannot rule out acute cholecystitis  Thickening versus underdistention of the urinary bladder which may represent cystitis  Follow-up with urology is recommended  The study was marked in [facility] for immediate notification  Workstation performed: [id number]     XR chest portable    Result Date: 9/13/2022  Narrative: CHEST INDICATION:   infectious workup  COMPARISON:  08/04/2022 EXAM PERFORMED/VIEWS:  XR CHEST PORTABLE Images: 2 FINDINGS: Cardiomediastinal silhouette appears enlarged  The pulmonary vessels are normal  The entire left lung base is not included on the study  There is some atelectasis at the left lung base  No pneumothorax or pleural effusion  Osseous structures appear within normal limits for patient age  Impression: Limited study  There is some atelectasis at the left lung base  Workstation performed: [id number]     FL ERCP biliary only    Result Date: 8/30/2022  Narrative: ERCP INDICATION:  Ampullary adenoma  COMPARISON:    IMAGES:  4 FLUOROSCOPY TIME:   1 minute and 13 seconds CONTRAST: 14 mL of iohexol (OMNIPAQUE) FINDINGS: Fluoroscopic guidance was provided for performance of ERCP  BILIARY: Common duct was visualized  CBD stent was placed  Impression: Please see procedure report for further details  Workstation performed: [id number]     US right upper quadrant    Result Date: 9/14/2022  Narrative: RIGHT UPPER QUADRANT ULTRASOUND INDICATION:     RUQ pain, pneumobilia, s/p sphincterotomy with stent placement   COMPARISON:  Compared with CT of 9/12/2022 TECHNIQUE:   Real-time ultrasound of the right upper quadrant was performed with a curvilinear transducer with both volumetric sweeps and still imaging techniques  FINDINGS: PANCREAS:  Visualized portions of the pancreas are within normal limits  AORTA AND IVC:  Visualized portions are normal for patient age  LIVER: Size:  Mildly enlarged  The liver measures 19 4 cm in the midclavicular line  Contour:  Surface contour is smooth  Parenchyma:  Echogenicity and echotexture are within normal limits  No liver mass identified  Limited imaging of the main portal vein shows it to be patent and hepatopetal   BILIARY: Gallbladder is distended and demonstrates intraluminal air similar to CT  Gallbladder wall thickness at 4 mm  No pericholecystic fluid  Small gallstones  No intrahepatic biliary dilatation  CBD measures 7 0 mm  Echogenic CBD stent similar to CT  No choledocholithiasis  KIDNEY: Right kidney measures 12 9 x 7 1 x 6 4 cm  Volume 306 7 mL Kidney within normal limits  ASCITES:   None  Impression: Distended gallbladder with air with stent in the CBD similar to CT  This could be secondary to the stent, lab correlation for ascending infection  Workstation performed: [id number]     IR temporary dialysis catheter placement    Result Date: 9/16/2022  Narrative: CENTRAL VENOUS CATHETER PLACEMENT Indication: Renal failure requiring hemodialysis  Procedure: The right neck was prepped and draped in sterile fashion  All elements of maximal sterile barrier technique were followed (cap, mask, sterile gown, sterile gloves, large sterile sheet, hand hygiene, and 2% chlorhexidine for cutaneous antisepsis)  Sterile ultrasound technique with sterile gel and sterile probe cover was also utilized  1% lidocaine was used for local anesthetic  The neck was surveyed with ultrasound to assess for vessel patency  The right internal jugular vein was punctured using a low lateral approach and direct ultrasound guidance with a 21-gauge needle  A static sonographic image was saved demonstrating needle entry   A guidewire was advanced into the inferior vena cava through an exchange dilator  The tract was  dilated and a 20 cm 14 [place] dual-lumen catheter was advanced  The catheter was secured with suture and a sterile dressing was applied  Fluoroscopic guidance was utilized for catheter placement  Fluoroscopy time: 1 1 minutes Images: 1 Findings: The right internal jugular vein is patent by limited ultrasound  The tip of the catheter terminates at the caval atrial junction  Both ports flush and aspirate blood easily  Impression: Impression: Image guided placement of nontunneled central venous hemodialysis catheter  Workstation performed: [id number]     IR biopsy bone marrow    Result Date: 9/8/2022  Narrative: PROCEDURE: CT-guided bone marrow biopsy Procedural Personnel Attending physician(s): Dr Tonio Grady Pre-procedure diagnosis: Multiple myeloma Post-procedure diagnosis: Same Indication: Patient with history of multiple myeloma returns for bone marrow biopsy  PROCEDURE SUMMARY: - Percutaneous CT-guided bone marrow biopsy PROCEDURE DETAILS: Pre-procedure Consent: Informed consent for the procedure including risks, benefits and alternatives was obtained and time-out was performed prior to the procedure  Preparation: The site was prepared and draped using maximal sterile barrier technique including cutaneous antisepsis  Anesthesia/sedation Level of anesthesia/sedation: Moderate sedation (conscious sedation) Anesthesia/sedation administered by: IR nurse under attending supervision with continuous monitoring of the patient's level of consciousness and physiologic status Total intra-service sedation time (minutes): 15 Biopsy Local anesthesia was administered  Under CT guidance, an 11-gauge bone marrow biopsy needle was advanced into the right posterior iliac spine bone marrow  7 mL of bone marrow aspirate and 1 core needle sample was obtained  Needle was removed and bandage applied   Radiation Dose CT dose length product (mGy-cm): 612 31 Additional Details Specimens removed: 7 mL bone marrow aspirate and 1 core needle sample  Estimated blood loss (mL): 1 Complications: No immediate complications  Impression: CT-guided biopsy of right posterior iliac spine bone marrow  Plan: Specimens sent for evaluation  Workstation performed: [id number]       EKG reviewed personally:  Atrial fibrillation with a slow ventricular response, nonspecific intraventricular conduction delay    Counseling / Coordination of Care  Total floor / unit time spent today 25 minutes  Greater than 50% of total time was spent with the patient and / or family counseling and / or coordination of care       Code Status: Level 1 - Full Code

## 2022-09-18 NOTE — ASSESSMENT & PLAN NOTE
· DORA on CKD 3  · Most recent d/c creat 3-3 7 prior in 2 range  · CT no hydro, nonobstructing calculi  · UA neg  · Nephrology following  · 9/15 R tunneled HD cath placed  · 9/15 and 9/16 IHD  · Monitor for renal recovery remains oliguric UO 83cc in 24hrs  · d/c demadex 9/18  · Bladder scan q shift  · Bolus PRN would recommend no fluid removal with IHD today  · Consider set schedule [facility]

## 2022-09-18 NOTE — PLAN OF CARE
Problem: Potential for Falls  Goal: Patient will remain free of falls  Description: INTERVENTIONS:  - Educate patient/family on patient safety including physical limitations  - Instruct patient to call for assistance with activity   - Consult OT/PT to assist with strengthening/mobility   - Keep Call bell within reach  - Keep bed low and locked with side rails adjusted as appropriate  - Keep care items and personal belongings within reach  - Initiate and maintain comfort rounds  - Make Fall Risk Sign visible to staff    - Apply yellow socks and bracelet for high fall risk patients  - Consider moving patient to room near nurses station  Outcome: Progressing     Problem: MOBILITY - ADULT  Goal: Maintain or return to baseline ADL function  Description: INTERVENTIONS:  -  Assess patient's ability to carry out ADLs; assess patient's baseline for ADL function and identify physical deficits which impact ability to perform ADLs (bathing, care of mouth/teeth, toileting, grooming, dressing, etc )  - Assess/evaluate cause of self-care deficits   - Assess range of motion  - Assess patient's mobility; develop plan if impaired  - Assess patient's need for assistive devices and provide as appropriate  - Encourage maximum independence but intervene and supervise when necessary  - Involve family in performance of ADLs  - Assess for home care needs following discharge   - Consider OT consult to assist with ADL evaluation and planning for discharge  - Provide patient education as appropriate  Outcome: Progressing  Goal: Maintains/Returns to pre admission functional level  Description: INTERVENTIONS:  - Perform BMAT or MOVE assessment daily    - Set and communicate daily mobility goal to care team and patient/family/caregiver     - Collaborate with rehabilitation services on mobility goals if consulted    - Out of bed for toileting  - Record patient progress and toleration of activity level   Outcome: Progressing     Problem: Prexisting or High Potential for Compromised Skin Integrity  Goal: Skin integrity is maintained or improved  Description: INTERVENTIONS:  - Identify patients at risk for skin breakdown  - Assess and monitor skin integrity  - Assess and monitor nutrition and hydration status  - Monitor labs   - Assess for incontinence   - Turn and reposition patient  - Assist with mobility/ambulation  - Relieve pressure over bony prominences  - Avoid friction and shearing  - Provide appropriate hygiene as needed including keeping skin clean and dry  - Evaluate need for skin moisturizer/barrier cream  - Collaborate with interdisciplinary team   - Patient/family teaching  - Consider wound care consult   Outcome: Progressing     Problem: Nutrition/Hydration-ADULT  Goal: Nutrient/Hydration intake appropriate for improving, restoring or maintaining nutritional needs  Description: Monitor and assess patient's nutrition/hydration status for malnutrition  Collaborate with interdisciplinary team and initiate plan and interventions as ordered  Monitor patient's weight and dietary intake as ordered or per policy  Utilize nutrition screening tool and intervene as necessary  Determine patient's food preferences and provide high-protein, high-caloric foods as appropriate       INTERVENTIONS:  - Monitor oral intake, urinary output, labs, and treatment plans  - Assess nutrition and hydration status and recommend course of action  - Evaluate amount of meals eaten  - Assist patient with eating if necessary   - Allow adequate time for meals  - Recommend/ encourage appropriate diets, oral nutritional supplements, and vitamin/mineral supplements  - Order, calculate, and assess calorie counts as needed  - Recommend, monitor, and adjust tube feedings and TPN/PPN based on assessed needs  - Assess need for intravenous fluids  - Provide specific nutrition/hydration education as appropriate  - Include patient/family/caregiver in decisions related to nutrition  Outcome: Progressing     Problem: GASTROINTESTINAL - ADULT  Goal: Minimal or absence of nausea and/or vomiting  Description: INTERVENTIONS:  - Administer IV fluids if ordered to ensure adequate hydration  - Maintain NPO status until nausea and vomiting are resolved  - Nasogastric tube if ordered  - Administer ordered antiemetic medications as needed  - Provide nonpharmacologic comfort measures as appropriate  - Advance diet as tolerated, if ordered  - Consider nutrition services referral to assist patient with adequate nutrition and appropriate food choices  Outcome: Progressing  Goal: Maintains or returns to baseline bowel function  Description: INTERVENTIONS:  - Assess bowel function  - Encourage oral fluids to ensure adequate hydration  - Administer IV fluids if ordered to ensure adequate hydration  - Administer ordered medications as needed  - Encourage mobilization and activity  - Consider nutritional services referral to assist patient with adequate nutrition and appropriate food choices  Outcome: Progressing  Goal: Maintains adequate nutritional intake  Description: INTERVENTIONS:  - Monitor percentage of each meal consumed  - Identify factors contributing to decreased intake, treat as appropriate  - Assist with meals as needed  - Monitor I&O, weight, and lab values if indicated  - Obtain nutrition services referral as needed  Outcome: Progressing     Problem: GENITOURINARY - ADULT  Goal: Maintains or returns to baseline urinary function  Description: INTERVENTIONS:  - Assess urinary function  - Encourage oral fluids to ensure adequate hydration if ordered  - Administer IV fluids as ordered to ensure adequate hydration  - Administer ordered medications as needed  - Offer frequent toileting  - Follow urinary retention protocol if ordered  Outcome: Progressing  Goal: Absence of urinary retention  Description: INTERVENTIONS:  - Assess patients ability to void and empty bladder  - Monitor I/O  - Bladder scan as needed  - Discuss with physician/AP medications to alleviate retention as needed  - Discuss catheterization for long term situations as appropriate  Outcome: Progressing  Goal: Urinary catheter remains patent  Description: INTERVENTIONS:  - Assess patency of urinary catheter  - If patient has a chronic mehta, consider changing catheter if non-functioning  - Follow guidelines for intermittent irrigation of non-functioning urinary catheter  Outcome: Progressing     Problem: METABOLIC, FLUID AND ELECTROLYTES - ADULT  Goal: Electrolytes maintained within normal limits  Description: INTERVENTIONS:  - Monitor labs and assess patient for signs and symptoms of electrolyte imbalances  - Administer electrolyte replacement as ordered  - Monitor response to electrolyte replacements, including repeat lab results as appropriate  - Instruct patient on fluid and nutrition as appropriate  Outcome: Progressing  Goal: Fluid balance maintained  Description: INTERVENTIONS:  - Monitor labs   - Monitor I/O and WT  - Instruct patient on fluid and nutrition as appropriate  - Assess for signs & symptoms of volume excess or deficit  Outcome: Progressing     Problem: PAIN - ADULT  Goal: Verbalizes/displays adequate comfort level or baseline comfort level  Description: Interventions:  - Encourage patient to monitor pain and request assistance  - Assess pain using appropriate pain scale  - Administer analgesics based on type and severity of pain and evaluate response  - Implement non-pharmacological measures as appropriate and evaluate response  - Consider cultural and social influences on pain and pain management  - Notify physician/advanced practitioner if interventions unsuccessful or patient reports new pain  Outcome: Progressing     Problem: INFECTION - ADULT  Goal: Absence or prevention of progression during hospitalization  Description: INTERVENTIONS:  - Assess and monitor for signs and symptoms of infection  - Monitor lab/diagnostic results  - Monitor all insertion sites, i e  indwelling lines, tubes, and drains  - Monitor endotracheal if appropriate and nasal secretions for changes in amount and color  - [place] appropriate cooling/warming therapies per order  - Administer medications as ordered  - Instruct and encourage patient and family to use good hand hygiene technique  - Identify and instruct in appropriate isolation precautions for identified infection/condition  Outcome: Progressing  Goal: Absence of fever/infection during neutropenic period  Description: INTERVENTIONS:  - Monitor WBC    Outcome: Progressing     Problem: SAFETY ADULT  Goal: Patient will remain free of falls  Description: INTERVENTIONS:  - Educate patient/family on patient safety including physical limitations  - Instruct patient to call for assistance with activity   - Consult OT/PT to assist with strengthening/mobility   - Keep Call bell within reach  - Keep bed low and locked with side rails adjusted as appropriate  - Keep care items and personal belongings within reach  - Initiate and maintain comfort rounds  - Make Fall Risk Sign visible to staff    - Apply yellow socks and bracelet for high fall risk patients  - Consider moving patient to room near nurses station  Outcome: Progressing  Goal: Maintain or return to baseline ADL function  Description: INTERVENTIONS:  -  Assess patient's ability to carry out ADLs; assess patient's baseline for ADL function and identify physical deficits which impact ability to perform ADLs (bathing, care of mouth/teeth, toileting, grooming, dressing, etc )  - Assess/evaluate cause of self-care deficits   - Assess range of motion  - Assess patient's mobility; develop plan if impaired  - Assess patient's need for assistive devices and provide as appropriate  - Encourage maximum independence but intervene and supervise when necessary  - Involve family in performance of ADLs  - Assess for home care needs following discharge   - Consider OT consult to assist with ADL evaluation and planning for discharge  - Provide patient education as appropriate  Outcome: Progressing  Goal: Maintains/Returns to pre admission functional level  Description: INTERVENTIONS:  - Perform BMAT or MOVE assessment daily    - Set and communicate daily mobility goal to care team and patient/family/caregiver  - Collaborate with rehabilitation services on mobility goals if consulted    - Out of bed for toileting  - Record patient progress and toleration of activity level   Outcome: Progressing     Problem: DISCHARGE PLANNING  Goal: Discharge to home or other facility with appropriate resources  Description: INTERVENTIONS:  - Identify barriers to discharge w/patient and caregiver  - Arrange for needed discharge resources and transportation as appropriate  - Identify discharge learning needs (meds, wound care, etc )  - Arrange for interpretive services to assist at discharge as needed  - Refer to Case Management Department for coordinating discharge planning if the patient needs post-hospital services based on physician/advanced practitioner order or complex needs related to functional status, cognitive ability, or social support system  Outcome: Progressing     Problem: Knowledge Deficit  Goal: Patient/family/caregiver demonstrates understanding of disease process, treatment plan, medications, and discharge instructions  Description: Complete learning assessment and assess knowledge base    Interventions:  - Provide teaching at level of understanding  - Provide teaching via preferred learning methods  Outcome: Progressing

## 2022-09-18 NOTE — ASSESSMENT & PLAN NOTE
· 2nd episode in one month (tx with 10 days antibx 8/2-8/11)  · CTAP acute diverticulitis  · GI following  · 7/25 Colonoscopy--2 polyps removed, Mild diverticula in the descending colon and sigmoid colon, Small, internal hemorrhoids  · Zosyn D 6/10  · Questran started 9/17   · Imodium PRN (1 daily last 3 days)  · Bentyl added 9/18 by GI  · Remains with high output 1L from rectal tube  · Clear liquids advance diet if ok with GI

## 2022-09-19 ENCOUNTER — APPOINTMENT (INPATIENT)
Dept: DIALYSIS | Facility: HOSPITAL | Age: 72
DRG: 674 | End: 2022-09-19
Attending: INTERNAL MEDICINE
Payer: COMMERCIAL

## 2022-09-19 PROBLEM — I95.9 HYPOTENSION: Status: RESOLVED | Noted: 2022-01-01 | Resolved: 2022-01-01

## 2022-09-19 LAB
ANION GAP SERPL CALCULATED.3IONS-SCNC: 14 MMOL/L (ref 4–13)
APTT PPP: 64 SECONDS (ref 23–37)
BUN SERPL-MCNC: 39 MG/DL (ref 5–25)
CALCIUM SERPL-MCNC: 8.8 MG/DL (ref 8.3–10.1)
CHLORIDE SERPL-SCNC: 104 MMOL/L (ref 96–108)
CO2 SERPL-SCNC: 23 MMOL/L (ref 21–32)
CREAT SERPL-MCNC: 6.28 MG/DL (ref 0.6–1.3)
GFR SERPL CREATININE-BSD FRML MDRD: 8 ML/MIN/1.73SQ M
GLUCOSE SERPL-MCNC: 100 MG/DL (ref 65–140)
GLUCOSE SERPL-MCNC: 103 MG/DL (ref 65–140)
GLUCOSE SERPL-MCNC: 121 MG/DL (ref 65–140)
GLUCOSE SERPL-MCNC: 194 MG/DL (ref 65–140)
GLUCOSE SERPL-MCNC: 88 MG/DL (ref 65–140)
GLUCOSE SERPL-MCNC: 89 MG/DL (ref 65–140)
INR PPP: 2.21 (ref 0.84–1.19)
POTASSIUM SERPL-SCNC: 3.5 MMOL/L (ref 3.5–5.3)
PROTHROMBIN TIME: 25.7 SECONDS (ref 11.6–14.5)
SODIUM SERPL-SCNC: 141 MMOL/L (ref 135–147)

## 2022-09-19 PROCEDURE — 85007 BL SMEAR W/DIFF WBC COUNT: CPT | Performed by: NURSE PRACTITIONER

## 2022-09-19 PROCEDURE — 99232 SBSQ HOSP IP/OBS MODERATE 35: CPT | Performed by: INTERNAL MEDICINE

## 2022-09-19 PROCEDURE — 85027 COMPLETE CBC AUTOMATED: CPT | Performed by: NURSE PRACTITIONER

## 2022-09-19 PROCEDURE — 85610 PROTHROMBIN TIME: CPT | Performed by: INTERNAL MEDICINE

## 2022-09-19 PROCEDURE — 82948 REAGENT STRIP/BLOOD GLUCOSE: CPT

## 2022-09-19 PROCEDURE — 85730 THROMBOPLASTIN TIME PARTIAL: CPT | Performed by: INTERNAL MEDICINE

## 2022-09-19 PROCEDURE — 80048 BASIC METABOLIC PNL TOTAL CA: CPT | Performed by: NURSE PRACTITIONER

## 2022-09-19 RX ORDER — TORSEMIDE 20 MG/1
40 TABLET ORAL 2 TIMES DAILY
Status: DISCONTINUED | OUTPATIENT
Start: 2022-09-19 | End: 2022-09-22

## 2022-09-19 RX ORDER — POTASSIUM CHLORIDE 20MEQ/15ML
40 LIQUID (ML) ORAL ONCE
Status: COMPLETED | OUTPATIENT
Start: 2022-09-19 | End: 2022-09-19

## 2022-09-19 RX ADMIN — INSULIN LISPRO 2 UNITS: 100 INJECTION, SOLUTION INTRAVENOUS; SUBCUTANEOUS at 12:18

## 2022-09-19 RX ADMIN — PANTOPRAZOLE SODIUM 40 MG: 40 TABLET, DELAYED RELEASE ORAL at 06:01

## 2022-09-19 RX ADMIN — ATORVASTATIN CALCIUM 40 MG: 40 TABLET, FILM COATED ORAL at 17:55

## 2022-09-19 RX ADMIN — DICYCLOMINE HYDROCHLORIDE 10 MG: 10 CAPSULE ORAL at 23:00

## 2022-09-19 RX ADMIN — PIPERACILLIN AND TAZOBACTAM 3.38 G: 3; .375 INJECTION, POWDER, LYOPHILIZED, FOR SOLUTION INTRAVENOUS at 02:59

## 2022-09-19 RX ADMIN — LOPERAMIDE HYDROCHLORIDE 2 MG: 2 CAPSULE ORAL at 16:06

## 2022-09-19 RX ADMIN — CHOLESTYRAMINE 4 G: 4 POWDER, FOR SUSPENSION ORAL at 17:55

## 2022-09-19 RX ADMIN — WARFARIN SODIUM 2 MG: 2 TABLET ORAL at 17:55

## 2022-09-19 RX ADMIN — PIPERACILLIN AND TAZOBACTAM 3.38 G: 3; .375 INJECTION, POWDER, LYOPHILIZED, FOR SOLUTION INTRAVENOUS at 16:06

## 2022-09-19 RX ADMIN — ALLOPURINOL 300 MG: 300 TABLET ORAL at 08:04

## 2022-09-19 RX ADMIN — DICYCLOMINE HYDROCHLORIDE 10 MG: 10 CAPSULE ORAL at 12:01

## 2022-09-19 RX ADMIN — LOPERAMIDE HYDROCHLORIDE 2 MG: 2 CAPSULE ORAL at 08:08

## 2022-09-19 RX ADMIN — CLOTRIMAZOLE: 0.01 CREAM TOPICAL at 08:06

## 2022-09-19 RX ADMIN — FLUTICASONE FUROATE AND VILANTEROL TRIFENATATE 1 PUFF: 200; 25 POWDER RESPIRATORY (INHALATION) at 08:05

## 2022-09-19 RX ADMIN — TORSEMIDE 40 MG: 20 TABLET ORAL at 12:01

## 2022-09-19 RX ADMIN — POTASSIUM CHLORIDE 40 MEQ: 20 SOLUTION ORAL at 09:29

## 2022-09-19 RX ADMIN — CHOLESTYRAMINE 4 G: 4 POWDER, FOR SUSPENSION ORAL at 08:05

## 2022-09-19 RX ADMIN — TORSEMIDE 40 MG: 20 TABLET ORAL at 17:55

## 2022-09-19 RX ADMIN — DICYCLOMINE HYDROCHLORIDE 10 MG: 10 CAPSULE ORAL at 16:06

## 2022-09-19 RX ADMIN — DICYCLOMINE HYDROCHLORIDE 10 MG: 10 CAPSULE ORAL at 06:01

## 2022-09-19 RX ADMIN — CLOTRIMAZOLE: 0.01 CREAM TOPICAL at 17:55

## 2022-09-19 RX ADMIN — Medication 6 MG: at 23:00

## 2022-09-19 NOTE — PLAN OF CARE
Problem: Potential for Falls  Goal: Patient will remain free of falls  Description: INTERVENTIONS:  - Educate patient/family on patient safety including physical limitations  - Instruct patient to call for assistance with activity   - Consult OT/PT to assist with strengthening/mobility   - Keep Call bell within reach  - Keep bed low and locked with side rails adjusted as appropriate  - Keep care items and personal belongings within reach  - Initiate and maintain comfort rounds  - Make Fall Risk Sign visible to staff  - Offer Toileting every 2 Hours, in advance of need  - Initiate/Maintain bed alarm  - Obtain necessary fall risk management equipment  - Apply yellow socks and bracelet for high fall risk patients  - Consider moving patient to room near nurses station  Outcome: Progressing     Problem: MOBILITY - ADULT  Goal: Maintain or return to baseline ADL function  Description: INTERVENTIONS:  -  Assess patient's ability to carry out ADLs; assess patient's baseline for ADL function and identify physical deficits which impact ability to perform ADLs (bathing, care of mouth/teeth, toileting, grooming, dressing, etc )  - Assess/evaluate cause of self-care deficits   - Assess range of motion  - Assess patient's mobility; develop plan if impaired  - Assess patient's need for assistive devices and provide as appropriate  - Encourage maximum independence but intervene and supervise when necessary  - Involve family in performance of ADLs  - Assess for home care needs following discharge   - Consider OT consult to assist with ADL evaluation and planning for discharge  - Provide patient education as appropriate  Outcome: Progressing  Goal: Maintains/Returns to pre admission functional level  Description: INTERVENTIONS:  - Perform BMAT or MOVE assessment daily    - Set and communicate daily mobility goal to care team and patient/family/caregiver     - Collaborate with rehabilitation services on mobility goals if consulted  - Perform Range of Motion 4 times a day  - Reposition patient every 2 hours  - Dangle patient 3 times a day  - Stand patient 3 times a day  - Ambulate patient 3 times a day  - Out of bed to chair 3 times a day   - Out of bed for meals 3 times a day  - Out of bed for toileting  - Record patient progress and toleration of activity level   Outcome: Progressing     Problem: Prexisting or High Potential for Compromised Skin Integrity  Goal: Skin integrity is maintained or improved  Description: INTERVENTIONS:  - Identify patients at risk for skin breakdown  - Assess and monitor skin integrity  - Assess and monitor nutrition and hydration status  - Monitor labs   - Assess for incontinence   - Turn and reposition patient  - Assist with mobility/ambulation  - Relieve pressure over bony prominences  - Avoid friction and shearing  - Provide appropriate hygiene as needed including keeping skin clean and dry  - Evaluate need for skin moisturizer/barrier cream  - Collaborate with interdisciplinary team   - Patient/family teaching  - Consider wound care consult   Outcome: Progressing     Problem: Nutrition/Hydration-ADULT  Goal: Nutrient/Hydration intake appropriate for improving, restoring or maintaining nutritional needs  Description: Monitor and assess patient's nutrition/hydration status for malnutrition  Collaborate with interdisciplinary team and initiate plan and interventions as ordered  Monitor patient's weight and dietary intake as ordered or per policy  Utilize nutrition screening tool and intervene as necessary  Determine patient's food preferences and provide high-protein, high-caloric foods as appropriate       INTERVENTIONS:  - Monitor oral intake, urinary output, labs, and treatment plans  - Assess nutrition and hydration status and recommend course of action  - Evaluate amount of meals eaten  - Assist patient with eating if necessary   - Allow adequate time for meals  - Recommend/ encourage appropriate diets, oral nutritional supplements, and vitamin/mineral supplements  - Order, calculate, and assess calorie counts as needed  - Recommend, monitor, and adjust tube feedings and TPN/PPN based on assessed needs  - Assess need for intravenous fluids  - Provide specific nutrition/hydration education as appropriate  - Include patient/family/caregiver in decisions related to nutrition  Outcome: Progressing     Problem: GASTROINTESTINAL - ADULT  Goal: Minimal or absence of nausea and/or vomiting  Description: INTERVENTIONS:  - Administer IV fluids if ordered to ensure adequate hydration  - Maintain NPO status until nausea and vomiting are resolved  - Nasogastric tube if ordered  - Administer ordered antiemetic medications as needed  - Provide nonpharmacologic comfort measures as appropriate  - Advance diet as tolerated, if ordered  - Consider nutrition services referral to assist patient with adequate nutrition and appropriate food choices  Outcome: Progressing  Goal: Maintains or returns to baseline bowel function  Description: INTERVENTIONS:  - Assess bowel function  - Encourage oral fluids to ensure adequate hydration  - Administer IV fluids if ordered to ensure adequate hydration  - Administer ordered medications as needed  - Encourage mobilization and activity  - Consider nutritional services referral to assist patient with adequate nutrition and appropriate food choices  Outcome: Progressing  Goal: Maintains adequate nutritional intake  Description: INTERVENTIONS:  - Monitor percentage of each meal consumed  - Identify factors contributing to decreased intake, treat as appropriate  - Assist with meals as needed  - Monitor I&O, weight, and lab values if indicated  - Obtain nutrition services referral as needed  Outcome: Progressing     Problem: GENITOURINARY - ADULT  Goal: Maintains or returns to baseline urinary function  Description: INTERVENTIONS:  - Assess urinary function  - Encourage oral fluids to ensure adequate hydration if ordered  - Administer IV fluids as ordered to ensure adequate hydration  - Administer ordered medications as needed  - Offer frequent toileting  - Follow urinary retention protocol if ordered  Outcome: Progressing  Goal: Absence of urinary retention  Description: INTERVENTIONS:  - Assess patients ability to void and empty bladder  - Monitor I/O  - Bladder scan as needed  - Discuss with physician/AP medications to alleviate retention as needed  - Discuss catheterization for long term situations as appropriate  Outcome: Progressing  Goal: Urinary catheter remains patent  Description: INTERVENTIONS:  - Assess patency of urinary catheter  - If patient has a chronic mehta, consider changing catheter if non-functioning  - Follow guidelines for intermittent irrigation of non-functioning urinary catheter  Outcome: Progressing     Problem: METABOLIC, FLUID AND ELECTROLYTES - ADULT  Goal: Electrolytes maintained within normal limits  Description: INTERVENTIONS:  - Monitor labs and assess patient for signs and symptoms of electrolyte imbalances  - Administer electrolyte replacement as ordered  - Monitor response to electrolyte replacements, including repeat lab results as appropriate  - Instruct patient on fluid and nutrition as appropriate  Outcome: Progressing  Goal: Fluid balance maintained  Description: INTERVENTIONS:  - Monitor labs   - Monitor I/O and WT  - Instruct patient on fluid and nutrition as appropriate  - Assess for signs & symptoms of volume excess or deficit  Outcome: Progressing     Problem: PAIN - ADULT  Goal: Verbalizes/displays adequate comfort level or baseline comfort level  Description: Interventions:  - Encourage patient to monitor pain and request assistance  - Assess pain using appropriate pain scale  - Administer analgesics based on type and severity of pain and evaluate response  - Implement non-pharmacological measures as appropriate and evaluate response  - Consider cultural and social influences on pain and pain management  - Notify physician/advanced practitioner if interventions unsuccessful or patient reports new pain  Outcome: Progressing     Problem: INFECTION - ADULT  Goal: Absence or prevention of progression during hospitalization  Description: INTERVENTIONS:  - Assess and monitor for signs and symptoms of infection  - Monitor lab/diagnostic results  - Monitor all insertion sites, i e  indwelling lines, tubes, and drains  - Monitor endotracheal if appropriate and nasal secretions for changes in amount and color  - [place] appropriate cooling/warming therapies per order  - Administer medications as ordered  - Instruct and encourage patient and family to use good hand hygiene technique  - Identify and instruct in appropriate isolation precautions for identified infection/condition  Outcome: Progressing  Goal: Absence of fever/infection during neutropenic period  Description: INTERVENTIONS:  - Monitor WBC    Outcome: Progressing     Problem: SAFETY ADULT  Goal: Patient will remain free of falls  Description: INTERVENTIONS:  - Educate patient/family on patient safety including physical limitations  - Instruct patient to call for assistance with activity   - Consult OT/PT to assist with strengthening/mobility   - Keep Call bell within reach  - Keep bed low and locked with side rails adjusted as appropriate  - Keep care items and personal belongings within reach  - Initiate and maintain comfort rounds  - Make Fall Risk Sign visible to staff  - Offer Toileting every 2 Hours, in advance of need  - Initiate/Maintain bed alarm  - Obtain necessary fall risk management equipment  - Apply yellow socks and bracelet for high fall risk patients  - Consider moving patient to room near nurses station  Outcome: Progressing  Goal: Maintain or return to baseline ADL function  Description: INTERVENTIONS:  -  Assess patient's ability to carry out ADLs; assess patient's baseline for ADL function and identify physical deficits which impact ability to perform ADLs (bathing, care of mouth/teeth, toileting, grooming, dressing, etc )  - Assess/evaluate cause of self-care deficits   - Assess range of motion  - Assess patient's mobility; develop plan if impaired  - Assess patient's need for assistive devices and provide as appropriate  - Encourage maximum independence but intervene and supervise when necessary  - Involve family in performance of ADLs  - Assess for home care needs following discharge   - Consider OT consult to assist with ADL evaluation and planning for discharge  - Provide patient education as appropriate  Outcome: Progressing  Goal: Maintains/Returns to pre admission functional level  Description: INTERVENTIONS:  - Perform BMAT or MOVE assessment daily    - Set and communicate daily mobility goal to care team and patient/family/caregiver  - Collaborate with rehabilitation services on mobility goals if consulted  - Perform Range of Motion 4 times a day  - Reposition patient every 2 hours    - Dangle patient 3 times a day  - Stand patient 3 times a day  - Ambulate patient 3 times a day  - Out of bed to chair 3 times a day   - Out of bed for meals 3 times a day  - Out of bed for toileting  - Record patient progress and toleration of activity level   Outcome: Progressing     Problem: DISCHARGE PLANNING  Goal: Discharge to home or other facility with appropriate resources  Description: INTERVENTIONS:  - Identify barriers to discharge w/patient and caregiver  - Arrange for needed discharge resources and transportation as appropriate  - Identify discharge learning needs (meds, wound care, etc )  - Arrange for interpretive services to assist at discharge as needed  - Refer to Case Management Department for coordinating discharge planning if the patient needs post-hospital services based on physician/advanced practitioner order or complex needs related to functional status, cognitive ability, or social support system  Outcome: Progressing     Problem: Knowledge Deficit  Goal: Patient/family/caregiver demonstrates understanding of disease process, treatment plan, medications, and discharge instructions  Description: Complete learning assessment and assess knowledge base    Interventions:  - Provide teaching at level of understanding  - Provide teaching via preferred learning methods  Outcome: Progressing

## 2022-09-19 NOTE — PROGRESS NOTES
Progress Note - Nephrology   Nate Grit 81 y o  male MRN: [id number]  Unit/Bed#: -01 Encounter: [id number]      Assessment / Plan:  1  DORA, present on admission, on top of CKD stage 3b  DORA likely prerenal in setting of high GI output with possible conversion to ATN  -patient oliguric, started on HD() via temp HD line(placed 9/15)  -second treatment performed   -as patient remains 10L net positive, plan for 2L UF with HD today  -monitor for signs of renal recovery  -eval daily for RRT needs  2  Hypokalemia - mild, correct on HD  3  Anemia in setting of Multiple myeloma - Hgb in 7s, monitor CBC, stable, transfuse prn, follows with hematology outpatient  4  Hypotension - BP improved, monitor on torsemide  5  Chronic combined CHF with EF 45% - on torsemide 40mg po BID restarted today, monitor daily weight, I/O, UF as tolerated with HD today  eval for UF daily  6  Acidosis -corrected on HD, monitor BMP  7  Acute diverticulitis with large volume of stool - rectal tube in place, on questran per primary team    Other issues:  Chronic AFib, morbid obesity, hyperlipidemia, COPD, gout, diabetes mellitus type 2    Subjective:   Patient denies chest pain, shortness breath or nausea but did have vomiting last night  He has diarrhea and has rectal tube in place  No other complaints  Objective:     Vitals: Blood pressure (!) 116/11, pulse (!) 54, temperature 97 8 °F (36 6 °C), temperature source Oral, resp  rate 17, height 6' 3" (1 905 m), weight 123 kg (271 lb 6 2 oz), SpO2 99 %  ,Body mass index is 33 92 kg/m²  Temp (24hrs), Av 9 °F (36 6 °C), Min:97 7 °F (36 5 °C), Max:98 2 °F (36 8 °C)      Weight (last 2 days)     Date/Time Weight    22 0600 123 (271 39)    22 0544 120 (263 89)    22 0534 118 (260 14)            Intake/Output Summary (Last 24 hours) at 2022 1431  Last data filed at 2022 1345  Gross per 24 hour   Intake 1240 ml   Output 700 ml   Net 540 ml I/O last 24 hours: In: 1846 3 [P O :1300; I V :346 3; IV Piggyback:200]  Out: 1050 [Stool:1050]        Physical Exam:   Physical Exam  Vitals reviewed  Constitutional:       General: He is not in acute distress  Appearance: He is well-developed  He is obese  He is not diaphoretic  HENT:      Head: Normocephalic and atraumatic  Nose: Nose normal       Mouth/Throat:      Mouth: Mucous membranes are dry  Pharynx: No oropharyngeal exudate  Eyes:      General: No scleral icterus  Right eye: No discharge  Left eye: No discharge  Neck:      Thyroid: No thyromegaly  Cardiovascular:      Rate and Rhythm: Normal rate and regular rhythm  Heart sounds: Normal heart sounds  Pulmonary:      Effort: Pulmonary effort is normal       Breath sounds: Normal breath sounds  No wheezing or rales  Abdominal:      General: Bowel sounds are normal  There is no distension  Palpations: Abdomen is soft  Tenderness: There is no abdominal tenderness  Comments: Rectal tube   Musculoskeletal:         General: No swelling  Cervical back: Neck supple  Comments: S/p L BKA   Lymphadenopathy:      Cervical: No cervical adenopathy  Skin:     General: Skin is warm and dry  Coloration: Skin is pale  Findings: No rash  Neurological:      Mental Status: He is alert        Comments: awake   Psychiatric:         Mood and Affect: Mood normal          Behavior: Behavior normal          Invasive Devices  Report    Peripheral Intravenous Line  Duration           Peripheral IV 09/16/22 Distal;Left;Upper;Ventral (anterior) Arm 2 days    Peripheral IV 09/16/22 Distal;Right;Upper;Ventral (anterior) Arm 2 days          Hemodialysis Catheter  Duration           HD Temporary Double Catheter 3 days          Drain  Duration           Rectal Tube With balloon 4 days                Medications:    Scheduled Meds:  Current Facility-Administered Medications   Medication Dose Route Frequency Provider Last Rate    acetaminophen  650 mg Oral Q6H PRN Karan Cardenasing, PA-SHANEKA      albuterol  2 puff Inhalation Q6H PRN Karan Carney, JOEY      allopurinol  300 mg Oral Daily Karan Carney, JOEY      atorvastatin  40 mg Oral After Kassie Merchant PA-C      cholestyramine sugar free  4 g Oral BID Mort Haring, JOEY      clotrimazole   Topical BID Karan Haring, JOEY      dicyclomine  10 mg Oral 4x Daily [facility]) Monet Rivera MD      fluticasone-vilanterol  1 puff Inhalation Daily Karan Gravity [place]      insulin lispro  1-6 Units Subcutaneous Q6H [address] Karan Carney, JOEY      loperamide  2 mg Oral 4x Daily PRN Karan Carney, JOEY      melatonin  6 mg Oral HS Karan Cardenasing, JOEY      pantoprazole  40 mg Oral Early Morning Karan Carney, JOEY      piperacillin-tazobactam  3 375 g Intravenous Q12H Karan Carney, JOEY Stopped (09/19/22 0901)    torsemide  40 mg Oral BID Thacker Rump, CRNP      warfarin  2 mg Oral Daily (warfarin) Thacker Rump, CRNP         PRN Meds:   acetaminophen    albuterol    loperamide    Continuous Infusions:         LAB RESULTS:      Results from last 7 days   Lab Units 09/19/22  0442 09/18/22  0511 09/17/22  1135 09/17/22  0454 09/16/22  2356 09/16/22  2008 09/16/22  1647 09/16/22  1216 09/16/22  0907 09/16/22  0501 09/15/22  1241 09/15/22  0805 09/15/22  0529 09/14/22  1611 09/14/22  1202 09/14/22  0523 09/13/22  2322 09/13/22  1708 09/13/22  1156 09/13/22  0532 09/13/22  0107 09/12/22  2157 09/12/22  1958 09/12/22  1658   WBC [place]/uL 4 44 4 12*  --  5 47  --   --   --   --   --  5 82  --   --  5 77  --  7 10 6 41  --   --   --  7 02  --   --   --  10 20*   HEMOGLOBIN g/dL 7 5* 7 0*  --  7 5*  --   --   --   --   --  7 6*  --  7 1* 6 8*  --  7 8* 7 0*  --   --   --  8 3*  --   --   --  10 3*   HEMATOCRIT % 24 0* 22 1*  --  23 5*  --   --   --   --   --  23 6*  --   --  20 9*  --  23 7* 21 0*  --   --   --  26 2*  --   --   --  32 6*   PLATELETS Thousands/uL 254 203  --  189  -- --   --   --   --  166  --   --  141*  --  135* 123*  --   --   --  124*  --   --   --  130*   NEUTROS PCT %  --  54  --   --   --   --   --   --   --   --   --   --   --   --  72 71  --   --   --  73  --   --   --  78*   LYMPHS PCT %  --  22  --   --   --   --   --   --   --   --   --   --   --   --  15 15  --   --   --  14  --   --   --  12*   LYMPHO PCT % 0*  --   --   --   --   --   --   --   --   --   --   --   --   --   --   --   --   --   --   --   --   --   --   --    MONOS PCT %  --  14*  --   --   --   --   --   --   --   --   --   --   --   --  9 9  --   --   --  9  --   --   --  7   MONO PCT % 0*  --   --   --   --   --   --   --   --   --   --   --   --   --   --   --   --   --   --   --   --   --   --   --    EOS PCT % 0 8*  --   --   --   --   --   --   --   --   --   --   --   --  3 4  --   --   --  3  --   --   --  2   POTASSIUM mmol/L 3 5 3 3* 3 4* 3 7 3 9 4 1 3 7 3 3*   < > 3 6   < > 3 4* 3 6   < > 3 5 3 2* 3 7 4 0 4 5 4 9   < > 5 4*   < > 5 4*   CHLORIDE mmol/L 104 104 102 103 103 103 102 102   < > 102   < > 101 101   < > 102 105 106 107 109* 110*   < > 111*   < > 109*   CO2 mmol/L 23 25 28 20* 21 23 21 24   < > 20*   < > 24 24   < > 20* 18* 17* 17* 17* 12*   < > 12*   < > 10*   BUN mg/dL 39* 32* 34* 66* 68* 68* 66* 63*   < > 98*   < > 97* 100*   < > 101* 103* 103* 98* 102* 101*   < > 104*   < > 103*   CREATININE mg/dL 6 28* 4 92* 4 22* 7 71* 7 32* 7 35* 6 89* 6 45*   < > 9 11*   < > 8 44* 8 45*   < > 8 26* 8 06* 8 17* 8 14* 8 20* 8 21*   < > 8 32*   < > 8 44*   CALCIUM mg/dL 8 8 7 6* 8 0* 8 2* 8 6 8 4 8 3 8 1*   < > 8 4   < > 7 8* 8 3   < > 8 1* 7 7* 7 6* 7 8* 7 9* 8 1*   < > 8 4   < > 8 5   ALK PHOS U/L  --  87  --   --   --   --   --   --   --   --   --   --   --   --   --  92  --   --   --  106  --   --   --  135*   ALT U/L  --  8*  --   --   --   --   --   --   --   --   --   --   --   --   --  14  --   --   --  16  --   --   --  17   AST U/L  --  14  --   --   --   --   --   --   --   -- --   --   --   --   --  14  --   --   --  13  --   --   --  17   MAGNESIUM mg/dL  --  2 0 1 7 2 1 2 6 2 3 2 1 2 1   < > 2 7*   < > 2 1 2 5   < > 2 2 1 9 1 5* 1 6 1 6 1 6  --  1 1*   < >  --    PHOSPHORUS mg/dL  --   --   --  6 0*  --   --   --   --   --   --   --   --   --   --   --  6 1* 5 6* 5 4* 5 4* 5 4*  --  5 4*  --   --     < > = values in this interval not displayed  CUTURES:  Lab Results   Component Value Date    BLOODCX No Growth After 5 Days  09/12/2022    BLOODCX No Growth After 5 Days  09/12/2022    BLOODCX No Growth After 5 Days  10/04/2021    BLOODCX No Growth After 5 Days  10/04/2021    BLOODCX No Growth After 5 Days  06/25/2020    BLOODCX No Growth After 5 Days  06/25/2020    BLOODCX Staphylococcus coagulase negative (A) 12/04/2019    BLOODCX No Growth After 5 Days  12/04/2019    URINECX >100,000 cfu/ml Escherichia coli (A) 12/05/2019                 Portions of the record may have been created with voice recognition software  Occasional wrong word or "sound a like" substitutions may have occurred due to the inherent limitations of voice recognition software  Read the chart carefully and recognize, using context, where substitutions have occurred  If you have any questions, please contact the dictating provider

## 2022-09-19 NOTE — ASSESSMENT & PLAN NOTE
· 2nd episode in one month (tx with 10 days antibx 8/2-8/11)  · CTAP acute diverticulitis  · GI following  · 7/25 Colonoscopy--2 polyps removed, Mild diverticula in the descending colon and sigmoid colon, Small, internal hemorrhoids  · Zosyn D 7/10  · Questran started 9/17   · Imodium PRN  · Bentyl added 9/18 by GI  · Rectal tube output decreased to 650  · advance diet

## 2022-09-19 NOTE — OCCUPATIONAL THERAPY NOTE
Occupational Therapy Screen Note     Patient Name: Umesh Boyer  [id number] Date: 9/19/2022  Problem List  Principal Problem:    DORA (acute kidney injury) ([facility] [address] )  Active Problems:    Diarrhea    Chronic atrial fibrillation (HCC)    Morbid obesity (HCC)    Chronic combined systolic and diastolic congestive heart failure (HCC)    GERD (gastroesophageal reflux disease)    Hyperlipidemia    NALLELY (obstructive sleep apnea)    Diabetes mellitus, type 2 (HCC)    Multiple myeloma (HCC)    Chronic obstructive pulmonary disease, unspecified (HCC)    Gout    Ambulatory dysfunction    Anemia    Supratherapeutic INR    Acute diverticulitis    Abnormal CT scan    Hypokalemia due to excessive gastrointestinal loss of potassium              09/19/22 0824   OT Last Visit   OT Visit Date 09/19/22   Note Type   Note type Screen   Additional Comments OT orders received  Chart reviewed  Per CM noted 9/13/22, pt is linden lift to W/C & receives assistance for ADL's at baseline  Plan to return to facility  Pt at functional baseline  Will DC OT orders       Gregory Saucedo OTR/L

## 2022-09-19 NOTE — ASSESSMENT & PLAN NOTE
Wt Readings from Last 3 Encounters:   09/19/22 123 kg (271 lb 6 2 oz)   09/06/22 117 kg (257 lb 15 oz)   08/30/22 118 kg (260 lb)     · EF 45% decreased RV fx  · Appears euvolemic to dry prior weights noted 126kg  · Resume demadex  · Weight up 4kg IHD today for volume mgmnt  · I/O  · Daily weight  ·

## 2022-09-19 NOTE — PROGRESS NOTES
Progress note - Gastroenterology   Delisa Day 81 y o  male MRN: [id number]  Unit/Bed#: -01 Encounter: [id number]    ASSESSMENT and PLAN    Abdominal pain/acute sigmoid diverticulitis - pain finally improved  Unclear if this is just improvement of diverticulitis or decrease in bowel spasm with dicyclomine    Regardless, finally some clinical improvement     · Full liquid diet  Advance as tolerated  · Continue IV antibiotics  · Continue dicyclomine     Diarrhea - C diff and culture negative   Suspect secondary to antibiotics  · Continue cholestyramine and  dicyclomine      Chief Complaint   Patient presents with    Abnormal Lab     Patient arrives via EMS from [facility] for an elevated creatinine of 7 3  Reports yellow emesis for a few days  Patient has RUQ and RLQ abdominal pain, is currently getting chemo once a week for kidney cancer  SUBJECTIVE/HPI   Abdominal pain improved  Patient moved his bowels yesterday  Denies any nausea or vomiting  Tolerated clear liquid diet      /70 (BP Location: Right arm)   Pulse 55   Temp 97 7 °F (36 5 °C) (Oral)   Resp 18   Ht 6' 3" (1 905 m)   Wt 123 kg (271 lb 6 2 oz)   SpO2 98%   BMI 33 92 kg/m²     PHYSICALEXAM  General appearance: alert, appears stated age and cooperative  Eyes: PERLLA, EOMI, no icterus   Head: Normocephalic, without obvious abnormality, atraumatic  Lungs: clear to auscultation bilaterally  Heart: regular rate and rhythm, S1, S2 normal, no murmur, click, rub or gallop  Abdomen: soft, non-tender; bowel sounds normal; no masses,  no organomegaly  Extremities: extremities normal, atraumatic, no cyanosis or edema  Neurologic: Grossly normal    Lab Results   Component Value Date    GLUCOSE 64 (L) 08/01/2022    CALCIUM 8 8 09/19/2022     01/15/2018    K 3 5 09/19/2022    CO2 23 09/19/2022     09/19/2022    BUN 39 (H) 09/19/2022    CREATININE 6 28 (H) 09/19/2022     Lab Results   Component Value Date    WBC 4 44 09/19/2022    HGB 7 5 (L) 09/19/2022    HCT 24 0 (L) 09/19/2022    MCV 85 09/19/2022     09/19/2022     Lab Results   Component Value Date    ALT 8 (L) 09/18/2022    AST 14 09/18/2022     (H) 11/03/2019    ALKPHOS 87 09/18/2022    BILITOT 0 6 01/15/2018     No results found for: AMYLASE  Lab Results   Component Value Date    LIPASE 691 (H) 09/13/2022     Lab Results   Component Value Date    IRON 17 (L) 08/04/2022    TIBC 206 (L) 08/04/2022    FERRITIN 165 08/04/2022     Lab Results   Component Value Date    INR 2 21 (H) 09/19/2022

## 2022-09-19 NOTE — ASSESSMENT & PLAN NOTE
· Coumadin held last admission  · NH records requested and coumadin never restarted brief heparin SQ with BMB 9/12  · resume coumadin at 2mg and watch closely

## 2022-09-19 NOTE — PROGRESS NOTES
[place]  Progress Note - Rito Alpers 1950, 81 y o  male MRN: [id number]  Unit/Bed#: -01 Encounter: [id number]  Primary Care Provider: Margarette Crabtree MD   Date and time admitted to hospital: 9/12/2022  4:09 PM    * DORA (acute kidney injury) [facility])  Assessment & Plan  · DORA on CKD 3  · Most recent d/c creat 3-3 7 prior in 2 range  · CT no hydro, nonobstructing calculi  · UA neg  · Nephrology following  · 9/15 R tunneled HD cath placed  · 9/15 and 9/16 IHD  · Monitor for renal recovery remains oliguric  · Bladder scan q shift 67 last PM  · Plan for IHD today  · Resume demadex    Acute diverticulitis  Assessment & Plan  · 2nd episode in one month (tx with 10 days antibx 8/2-8/11)  · CTAP acute diverticulitis  · GI following  · 7/25 Colonoscopy--2 polyps removed, Mild diverticula in the descending colon and sigmoid colon, Small, internal hemorrhoids  · Zosyn D 7/10  · Questran started 9/17   · Imodium PRN  · Bentyl added 9/18 by GI  · Rectal tube output decreased to 650  · advance diet    Diarrhea  Assessment & Plan  · noninfectious  · 650 out  · Rectal tube in place consider d/c with decreased outpt  · Cdiff/PCR neg  · GI following  · Questran/Bentyl/PRN imodium    Abnormal CT scan  Assessment & Plan  · CTAP-common bile duct stent with pneumobilia and air within gallblader  Distended gallbladder with cholelithiasis  · RUQ u/s distended gallbladder with air within stent in CBD similar to CT  · Surgery consulted  If concern for acute cholecystitis not surgical candidate would need per vanessa tube  · GI following  · ERCP 8/29/2022, Dr Sreedhar Murray, [place] esophagus seen during EGD, biopsy obtained   ERCP performed with endoscopic mucosal resection of abnormal looking ampullary tissue, PD, CBD stent placed    · No s/s acute cholecystitis monitor     Supratherapeutic INR  Assessment & Plan  · Coumadin held last admission  · NH records requested and coumadin never restarted brief heparin SQ with BMB 9/12  · resume coumadin at 2mg and watch closely      Chronic combined systolic and diastolic congestive heart failure ([facility] )  Assessment & Plan  Wt Readings from Last 3 Encounters:   09/19/22 123 kg (271 lb 6 2 oz)   09/06/22 117 kg (257 lb 15 oz)   08/30/22 118 kg (260 lb)     · EF 45% decreased RV fx  · Appears euvolemic to dry prior weights noted 126kg  · Resume demadex  · Weight up 4kg IHD today for volume mgmnt  · I/O  · Daily weight  ·         Chronic atrial fibrillation (HCC)  Assessment & Plan  · Bradycardia with Junctional escape beats 20's overnight with hypotension  · No rate control meds d/c in august due to bradycardia  · TSH 0 4  · Coumadin  · Cards consulted recommending CPAP HS and occurs with apnea episodes PT REFUSES    Diabetes mellitus, type 2 [facility])  Assessment & Plan  Lab Results   Component Value Date    HGBA1C 6 3 (H) 08/05/2022       Recent Labs     09/18/22  1618 09/18/22  2115 09/19/22  0115 09/19/22  0558   POCGLU 147* 118 100 88       Blood Sugar Average: Last 72 hrs:  (P) 419 [id number]   · Hold lantus with DORA/hypoglycemia  · SSI    Anemia  Assessment & Plan  · Baseline hgb 8  · Receives IV venofer  · Transfuse for hgb <7    Multiple myeloma (HCC)  Assessment & Plan  · Follows with Dr Alma Major  · On Velcade and decadron last tx 9/6  · Free light chains pending outpt f/u    Ambulatory dysfunction  Assessment & Plan  · 2/2 R AKA  · PT/OT    Chronic obstructive pulmonary disease, unspecified ([facility] )  Assessment & Plan  · Home inhaler  · Not AE    GERD (gastroesophageal reflux disease)  Assessment & Plan  · PPI    Hyperlipidemia  Assessment & Plan  · Home statin    NALLELY (obstructive sleep apnea)  Assessment & Plan  · Refuses cpap    Morbid obesity ([facility] )  Assessment & Plan  · Dietary education    Hypokalemia due to excessive gastrointestinal loss of potassium  Assessment & Plan  · Replete k/mg and recheck    Gout  Assessment & Plan  · Home allopurinol        VTE Pharmacologic Prophylaxis:   Pharmacologic: Warfarin (Coumadin)  Mechanical VTE Prophylaxis in Place: Yes    Patient Centered Rounds: I have performed bedside rounds with nursing staff today  Discussions with Specialists or Other Care Team Provider: will discuss volume status with renal    Education and Discussions with Family / Patient: patient    Time Spent for Care: 20 minutes  More than 50% of total time spent on counseling and coordination of care as described above  Current Length of Stay: 7 day(s)    Current Patient Status: Inpatient   Certification Statement: The patient will continue to require additional inpatient hospital stay due to rafaela/diarrhea  will plan to discuss with CM today IHD needs on d/c    Discharge Plan: from [address]  Pending PT/OT    Code Status: Level 1 - Full Code      Subjective:   C/o mild LUQ pain overall improved  Asking for meat and cheese    Objective:     Vitals:   Temp (24hrs), Av °F (36 7 °C), Min:97 9 °F (36 6 °C), Max:98 2 °F (36 8 °C)    Temp:  [97 9 °F (36 6 °C)-98 2 °F (36 8 °C)] 97 9 °F (36 6 °C)  HR:  [38-57] 54  Resp:  [14-21] 21  BP: (113-149)/(53-64) 149/58  SpO2:  [99 %-100 %] 100 %  Body mass index is 33 92 kg/m²  Input and Output Summary (last 24 hours): Intake/Output Summary (Last 24 hours) at 2022 0737  Last data filed at 2022 0600  Gross per 24 hour   Intake 946 29 ml   Output 1050 ml   Net -103 71 ml       Physical Exam:     Physical Exam  Constitutional:       General: He is not in acute distress  HENT:      Head: Normocephalic and atraumatic  Eyes:      General: No scleral icterus  Pupils: Pupils are equal, round, and reactive to light  Neck:      Vascular: No JVD  Cardiovascular:      Rate and Rhythm: Bradycardia present  Rhythm irregular  Heart sounds: Normal heart sounds  No murmur heard  No friction rub  No gallop  Pulmonary:      Effort: Pulmonary effort is normal  No respiratory distress  Breath sounds: Normal breath sounds  No wheezing or rales  Abdominal:      General: Bowel sounds are normal  There is no distension  Palpations: Abdomen is soft  Tenderness: There is no abdominal tenderness  Musculoskeletal:      Cervical back: Neck supple  Right lower leg: No edema  Left lower leg: No edema  Comments: ROMMELBLUE   Skin:     General: Skin is warm and dry  Coloration: Skin is pale  Findings: No erythema or rash  Neurological:      Mental Status: He is alert and oriented to person, place, and time  Cranial Nerves: No cranial nerve deficit  Additional Data:     Labs:    Results from last 7 days   Lab Units 09/19/22  0442 09/18/22  0511   WBC Thousand/uL 4 44 4 12*   HEMOGLOBIN g/dL 7 5* 7 0*   HEMATOCRIT % 24 0* 22 1*   PLATELETS Thousands/uL 254 203   NEUTROS PCT %  --  54   LYMPHS PCT %  --  22   LYMPHO PCT % 0*  --    MONOS PCT %  --  14*   MONO PCT % 0*  --    EOS PCT % 0 8*     Results from last 7 days   Lab Units 09/19/22  0442 09/18/22  0511   SODIUM mmol/L 141 140   POTASSIUM mmol/L 3 5 3 3*   CHLORIDE mmol/L 104 104   CO2 mmol/L 23 25   BUN mg/dL 39* 32*   CREATININE mg/dL 6 28* 4 92*   ANION GAP mmol/L 14* 11   CALCIUM mg/dL 8 8 7 6*   ALBUMIN g/dL  --  2 2*   TOTAL BILIRUBIN mg/dL  --  0 50   ALK PHOS U/L  --  87   ALT U/L  --  8*   AST U/L  --  14   GLUCOSE RANDOM mg/dL 89 107     Results from last 7 days   Lab Units 09/19/22  0442   INR  2 21*     Results from last 7 days   Lab Units 09/19/22  0558 09/19/22  0115 09/18/22  2115 09/18/22  1618 09/18/22  1109 09/18/22  0737 09/18/22  0554 09/18/22  0442 09/17/22  2350 09/17/22  2136 09/17/22  1634 09/17/22  1202   POC GLUCOSE mg/dl 88 100 118 147* 137 119 132 136 150* 149* 166* 83         Results from last 7 days   Lab Units 09/13/22  0532 09/12/22  1658   LACTIC ACID mmol/L  --  1 2   PROCALCITONIN ng/ml 0 62* 0 60*           * I Have Reviewed All Lab Data Listed Above    * Additional Pertinent Lab Tests Reviewed: [facility] Admission Reviewed    Imaging:    Imaging Reports Reviewed Today Include: ct  Imaging Personally Reviewed by Myself Includes:  na    Recent Cultures (last 7 days):     Results from last 7 days   Lab Units 09/13/22  1708 09/12/22  2157 09/12/22  1658   BLOOD CULTURE   --   --  No Growth After 5 Days  No Growth After 5 Days  LEGIONELLA URINARY ANTIGEN   --  Negative  --    C DIFF TOXIN B BY PCR  Negative  --   --        Last 24 Hours Medication List:   Current Facility-Administered Medications   Medication Dose Route Frequency Provider Last Rate    acetaminophen  650 mg Oral Q6H PRN Ashlegih Chelsey, PA-C      albuterol  2 puff Inhalation Q6H PRN [facility], PA-C      allopurinol  300 mg Oral Daily [facility], PA-C      atorvastatin  40 mg Oral After [employer], PA-C      cholestyramine sugar free  4 g Oral BID [facility], PA-C      clotrimazole   Topical BID [facility], PA-C      dicyclomine  10 mg Oral 4x Daily (AC & HS) Gina Garza MD      fluticasone-vilanterol  1 puff Inhalation Daily [facility], PA-C      insulin lispro  1-6 Units Subcutaneous Q6H [facility], PA-C      loperamide  2 mg Oral 4x Daily PRN [facility], PA-C      melatonin  6 mg Oral HS [facility], PA-C      pantoprazole  40 mg Oral Early Morning [facility], PA-C      piperacillin-tazobactam  3 375 g Intravenous Q12H [facility], PA-C 3 375 g (09/19/22 0259)    warfarin  2 mg Oral Daily (warfarin) NICOLA Faust          Today, Patient Was Seen By: NICOLA Faust    ** Please Note: Dictation voice to text software may have been used in the creation of this document   **

## 2022-09-19 NOTE — ASSESSMENT & PLAN NOTE
· CTAP-common bile duct stent with pneumobilia and air within gallblader  Distended gallbladder with cholelithiasis  · RUQ u/s distended gallbladder with air within stent in CBD similar to CT  · Surgery consulted  If concern for acute cholecystitis not surgical candidate would need per vanessa tube  · GI following  · ERCP 8/29/2022, Dr Sreedhar Murray, [place] esophagus seen during EGD, biopsy obtained   ERCP performed with endoscopic mucosal resection of abnormal looking ampullary tissue, PD, CBD stent placed    · No s/s acute cholecystitis monitor

## 2022-09-19 NOTE — PLAN OF CARE
Post-Dialysis RN Treatment Note    Blood Pressure: Pre 126/59 mm/Hg Post 134/63 mmHg   EDW  TBD kg    Weight:  Pre 109 4 kg   Post 107 3 kg   Mode of weight measurement: Bed Scale   Volume Removed  2000 ml    Treatment duration 180 minutes    NS given  No    Treatment shortened? No   Medications given during Rx None Reported   Estimated Kt/V  Not Applicable   Access type: Temporary HD catheter   Access Issues: No    Report called to primary nurse   Yes  MAYI Gutiérrez Rn      Problem: METABOLIC, FLUID AND ELECTROLYTES - ADULT  Goal: Electrolytes maintained within normal limits  Description: INTERVENTIONS:  - Monitor labs and assess patient for signs and symptoms of electrolyte imbalances  - Administer electrolyte replacement as ordered  - Monitor response to electrolyte replacements, including repeat lab results as appropriate  - Instruct patient on fluid and nutrition as appropriate  Outcome: Progressing  Goal: Fluid balance maintained  Description: INTERVENTIONS:  - Monitor labs   - Monitor I/O and WT  - Instruct patient on fluid and nutrition as appropriate  - Assess for signs & symptoms of volume excess or deficit  Outcome: Progressing

## 2022-09-19 NOTE — PHYSICAL THERAPY NOTE
Physical Therapy Screen    Patient Name: Cathy Pop    DDOGR'V Date: 9/19/2022     Problem List  Principal Problem:    [facility]) [facility] )  Active Problems:    Diarrhea    Chronic atrial fibrillation (HCC)    Morbid obesity (HCC)    Chronic combined systolic and diastolic congestive heart failure (HCC)    GERD (gastroesophageal reflux disease)    Hyperlipidemia    NALLELY (obstructive sleep apnea)    Diabetes mellitus, type 2 (HCC)    Multiple myeloma (HCC)    Chronic obstructive pulmonary disease, unspecified (HCC)    Gout    Ambulatory dysfunction    Anemia    Supratherapeutic INR    Acute diverticulitis    Abnormal CT scan    Hypokalemia due to excessive gastrointestinal loss of potassium       Past Medical History  Past Medical History:   Diagnosis Date    Cancer ([place] [address] )     CHF (congestive heart failure) [facility])     Chronic kidney disease     COPD (chronic obstructive pulmonary disease) ([place] [address] )     COVID-19     Decubitus ulcer of heel     LAST ASSESSED [id number]    Diabetes mellitus ([place] [address] )     History of varicose veins     Hypertension     Intermittent claudication (HCC)     Neuropathy     Seasonal allergies         Past Surgical History  Past Surgical History:   Procedure Laterality Date    AMPUTATION ABOVE KNEE (AKA) Left 7/31/2019    Procedure: AMPUTATION ABOVE KNEE (AKA);   Surgeon: Danie Tavares MD;  Location: [facility]IN [facility];  Service: General    ANGIOPLASTY      W/ FLUOROSC ANGIOGRAPGY PERIPHERAL ARTERY ADDITIONAL  LAST ASSESSED [id number]    ANGIOPLASTY / STENTING FEMORAL      TANSCATH INTRAVASCULAR STENT PLACEMENT PERCUTANEOUS FEMORAL     COLONOSCOPY  2010    CT BONE MARROW BIOPSY AND ASPIRATION  8/9/2019    FULL THICKNESS SKIN GRAFT      IR BIOPSY BONE MARROW  9/8/2022    IR TEMPORARY DIALYSIS CATHETER PLACEMENT  9/15/2022    TENDON REPAIR      TOE AMPUTATION Left 12/27/2018    Procedure: AMPUTATION left 4th TOE;  Surgeon: Severa Muff Declan Carey DPM;  Location:  MAIN OR;  Service: Podiatry         09/19/22 1505   PT Last Visit   PT Visit Date 09/19/22   Note Type   Note type Screen   Additional Comments PT orders received  Chart reviewed  Per CM noted 9/13/22, pt is linden lift to W/C & receives assistance for ADL's at baseline  Plan to return to facility  Pt at functional baseline  Will DC PT orders, no need for acute skilled PT         Odessa Pearson PT

## 2022-09-19 NOTE — ASSESSMENT & PLAN NOTE
· noninfectious  · 650 out  · Rectal tube in place consider d/c with decreased outpt  · Cdiff/PCR neg  · GI following  · Questran/Bentyl/PRN imodium

## 2022-09-19 NOTE — ASSESSMENT & PLAN NOTE
· DORA on CKD 3  · Most recent d/c creat 3-3 7 prior in 2 range  · CT no hydro, nonobstructing calculi  · UA neg  · Nephrology following  · 9/15 R tunneled HD cath placed  · 9/15 and 9/16 IHD  · Monitor for renal recovery remains oliguric  · Bladder scan q shift 67 last PM  · Plan for IHD today  · Resume demadex

## 2022-09-19 NOTE — QUICK NOTE
checked in on pt again as he was getting his dialysis  Told him that he is more than welcome to request a visit at a time that is more convenient for him if he is interested

## 2022-09-19 NOTE — ASSESSMENT & PLAN NOTE
· Bradycardia with Junctional escape beats 20's overnight with hypotension  · No rate control meds d/c in august due to bradycardia  · TSH 0 4  · Coumadin  · Cards consulted recommending CPAP HS and occurs with apnea episodes PT REFUSES

## 2022-09-19 NOTE — PLAN OF CARE
Problem: Potential for Falls  Goal: Patient will remain free of falls  Description: INTERVENTIONS:  - Educate patient/family on patient safety including physical limitations  - Instruct patient to call for assistance with activity   - Consult OT/PT to assist with strengthening/mobility   - Keep Call bell within reach  - Keep bed low and locked with side rails adjusted as appropriate  - Keep care items and personal belongings within reach  - Initiate and maintain comfort rounds  - Make Fall Risk Sign visible to staff  - Offer Toileting every 2 Hours, in advance of need  - Initiate/Maintain bed alarm  - Obtain necessary fall risk management equipment  - Apply yellow socks and bracelet for high fall risk patients  - Consider moving patient to room near nurses station  9/19/2022 0927 by Jeramie Russell RN  Outcome: Progressing  9/19/2022 0927 by Jeramie Russell RN  Outcome: Progressing  9/19/2022 0704 by Jeramie Russell RN  Outcome: Progressing     Problem: MOBILITY - ADULT  Goal: Maintain or return to baseline ADL function  Description: INTERVENTIONS:  -  Assess patient's ability to carry out ADLs; assess patient's baseline for ADL function and identify physical deficits which impact ability to perform ADLs (bathing, care of mouth/teeth, toileting, grooming, dressing, etc )  - Assess/evaluate cause of self-care deficits   - Assess range of motion  - Assess patient's mobility; develop plan if impaired  - Assess patient's need for assistive devices and provide as appropriate  - Encourage maximum independence but intervene and supervise when necessary  - Involve family in performance of ADLs  - Assess for home care needs following discharge   - Consider OT consult to assist with ADL evaluation and planning for discharge  - Provide patient education as appropriate  9/19/2022 0927 by Jeramie Russell RN  Outcome: Progressing  9/19/2022 0927 by Jeramie Russell RN  Outcome: Progressing  9/19/2022 0704 by Chano Flanagan RN  Outcome: Progressing  Goal: Maintains/Returns to pre admission functional level  Description: INTERVENTIONS:  - Perform BMAT or MOVE assessment daily    - Set and communicate daily mobility goal to care team and patient/family/caregiver  - Collaborate with rehabilitation services on mobility goals if consulted  - Perform Range of Motion 4 times a day  - Reposition patient every 2 hours    - Dangle patient 3 times a day  - Stand patient 3 times a day  - Ambulate patient 3 times a day  - Out of bed to chair 3 times a day   - Out of bed for meals 3 times a day  - Out of bed for toileting  - Record patient progress and toleration of activity level   9/19/2022 0927 by Chano Flanagan RN  Outcome: Progressing  9/19/2022 0927 by Chano Flanagan RN  Outcome: Progressing  9/19/2022 0704 by Chano Flanagan RN  Outcome: Progressing     Problem: Prexisting or High Potential for Compromised Skin Integrity  Goal: Skin integrity is maintained or improved  Description: INTERVENTIONS:  - Identify patients at risk for skin breakdown  - Assess and monitor skin integrity  - Assess and monitor nutrition and hydration status  - Monitor labs   - Assess for incontinence   - Turn and reposition patient  - Assist with mobility/ambulation  - Relieve pressure over bony prominences  - Avoid friction and shearing  - Provide appropriate hygiene as needed including keeping skin clean and dry  - Evaluate need for skin moisturizer/barrier cream  - Collaborate with interdisciplinary team   - Patient/family teaching  - Consider wound care consult   9/19/2022 0927 by Chano Flanagan RN  Outcome: Progressing  9/19/2022 0927 by Chano Flanagan RN  Outcome: Progressing  9/19/2022 0704 by Chano Flanagan RN  Outcome: Progressing     Problem: Nutrition/Hydration-ADULT  Goal: Nutrient/Hydration intake appropriate for improving, restoring or maintaining nutritional needs  Description: Monitor and assess patient's nutrition/hydration status for malnutrition  Collaborate with interdisciplinary team and initiate plan and interventions as ordered  Monitor patient's weight and dietary intake as ordered or per policy  Utilize nutrition screening tool and intervene as necessary  Determine patient's food preferences and provide high-protein, high-caloric foods as appropriate       INTERVENTIONS:  - Monitor oral intake, urinary output, labs, and treatment plans  - Assess nutrition and hydration status and recommend course of action  - Evaluate amount of meals eaten  - Assist patient with eating if necessary   - Allow adequate time for meals  - Recommend/ encourage appropriate diets, oral nutritional supplements, and vitamin/mineral supplements  - Order, calculate, and assess calorie counts as needed  - Recommend, monitor, and adjust tube feedings and TPN/PPN based on assessed needs  - Assess need for intravenous fluids  - Provide specific nutrition/hydration education as appropriate  - Include patient/family/caregiver in decisions related to nutrition  9/19/2022 0927 by Kenya Murillo RN  Outcome: Progressing  9/19/2022 0927 by Kenya Murillo RN  Outcome: Progressing  9/19/2022 0704 by Kenya Murillo RN  Outcome: Progressing     Problem: GASTROINTESTINAL - ADULT  Goal: Minimal or absence of nausea and/or vomiting  Description: INTERVENTIONS:  - Administer IV fluids if ordered to ensure adequate hydration  - Maintain NPO status until nausea and vomiting are resolved  - Nasogastric tube if ordered  - Administer ordered antiemetic medications as needed  - Provide nonpharmacologic comfort measures as appropriate  - Advance diet as tolerated, if ordered  - Consider nutrition services referral to assist patient with adequate nutrition and appropriate food choices  9/19/2022 0927 by Kenya Murillo RN  Outcome: Progressing  9/19/2022 0927 by Kenya Murillo RN  Outcome: Progressing  9/19/2022 0704 by Kenya Murillo RN  Outcome: Progressing  Goal: Maintains or returns to baseline bowel function  Description: INTERVENTIONS:  - Assess bowel function  - Encourage oral fluids to ensure adequate hydration  - Administer IV fluids if ordered to ensure adequate hydration  - Administer ordered medications as needed  - Encourage mobilization and activity  - Consider nutritional services referral to assist patient with adequate nutrition and appropriate food choices  9/19/2022 0927 by Chun Lawrence RN  Outcome: Progressing  9/19/2022 0927 by Chun Lawrence RN  Outcome: Progressing  9/19/2022 0704 by Chun Lawrence RN  Outcome: Progressing  Goal: Maintains adequate nutritional intake  Description: INTERVENTIONS:  - Monitor percentage of each meal consumed  - Identify factors contributing to decreased intake, treat as appropriate  - Assist with meals as needed  - Monitor I&O, weight, and lab values if indicated  - Obtain nutrition services referral as needed  9/19/2022 0927 by Chun Lawrence RN  Outcome: Progressing  9/19/2022 0927 by Chun Lawrence RN  Outcome: Progressing  9/19/2022 0704 by Chun Lawrence RN  Outcome: Progressing     Problem: GENITOURINARY - ADULT  Goal: Maintains or returns to baseline urinary function  Description: INTERVENTIONS:  - Assess urinary function  - Encourage oral fluids to ensure adequate hydration if ordered  - Administer IV fluids as ordered to ensure adequate hydration  - Administer ordered medications as needed  - Offer frequent toileting  - Follow urinary retention protocol if ordered  9/19/2022 0927 by Chun Lawrence RN  Outcome: Progressing  9/19/2022 0927 by Chun Lawrence RN  Outcome: Progressing  9/19/2022 0704 by Chun Lawrence RN  Outcome: Progressing  Goal: Absence of urinary retention  Description: INTERVENTIONS:  - Assess patients ability to void and empty bladder  - Monitor I/O  - Bladder scan as needed  - Discuss with physician/AP medications to alleviate retention as needed  - Discuss catheterization for long term situations as appropriate  9/19/2022 0927 by Frantz Medina RN  Outcome: Progressing  9/19/2022 0927 by Frantz Medina RN  Outcome: Progressing  9/19/2022 0704 by Frantz Medina RN  Outcome: Progressing  Goal: Urinary catheter remains patent  Description: INTERVENTIONS:  - Assess patency of urinary catheter  - If patient has a chronic mehta, consider changing catheter if non-functioning  - Follow guidelines for intermittent irrigation of non-functioning urinary catheter  9/19/2022 0927 by Frantz Medina RN  Outcome: Progressing  9/19/2022 0927 by Frantz Medina RN  Outcome: Progressing  9/19/2022 0704 by Frantz Medina RN  Outcome: Progressing     Problem: METABOLIC, FLUID AND ELECTROLYTES - ADULT  Goal: Electrolytes maintained within normal limits  Description: INTERVENTIONS:  - Monitor labs and assess patient for signs and symptoms of electrolyte imbalances  - Administer electrolyte replacement as ordered  - Monitor response to electrolyte replacements, including repeat lab results as appropriate  - Instruct patient on fluid and nutrition as appropriate  9/19/2022 0927 by Frantz Medina RN  Outcome: Progressing  9/19/2022 0927 by Frantz Medina RN  Outcome: Progressing  9/19/2022 0704 by Frantz Medina RN  Outcome: Progressing  Goal: Fluid balance maintained  Description: INTERVENTIONS:  - Monitor labs   - Monitor I/O and WT  - Instruct patient on fluid and nutrition as appropriate  - Assess for signs & symptoms of volume excess or deficit  9/19/2022 0927 by Frantz Medina RN  Outcome: Progressing  9/19/2022 0927 by Frantz Medina RN  Outcome: Progressing  9/19/2022 0704 by Frantz Medina RN  Outcome: Progressing     Problem: PAIN - ADULT  Goal: Verbalizes/displays adequate comfort level or baseline comfort level  Description: Interventions:  - Encourage patient to monitor pain and request assistance  - Assess pain using appropriate pain scale  - Administer analgesics based on type and severity of pain and evaluate response  - Implement non-pharmacological measures as appropriate and evaluate response  - Consider cultural and social influences on pain and pain management  - Notify physician/advanced practitioner if interventions unsuccessful or patient reports new pain  9/19/2022 0927 by Chano Flanagan RN  Outcome: Progressing  9/19/2022 0927 by Chano Flanagan RN  Outcome: Progressing  9/19/2022 0704 by Chano Flanagan RN  Outcome: Progressing     Problem: INFECTION - ADULT  Goal: Absence or prevention of progression during hospitalization  Description: INTERVENTIONS:  - Assess and monitor for signs and symptoms of infection  - Monitor lab/diagnostic results  - Monitor all insertion sites, i e  indwelling lines, tubes, and drains  - Monitor endotracheal if appropriate and nasal secretions for changes in amount and color  - [place] appropriate cooling/warming therapies per order  - Administer medications as ordered  - Instruct and encourage patient and family to use good hand hygiene technique  - Identify and instruct in appropriate isolation precautions for identified infection/condition  9/19/2022 0927 by Chano Flanagan RN  Outcome: Progressing  9/19/2022 0927 by Chano Flanagan RN  Outcome: Progressing  9/19/2022 0704 by Chano Flanagan RN  Outcome: Progressing  Goal: Absence of fever/infection during neutropenic period  Description: INTERVENTIONS:  - Monitor WBC    9/19/2022 0927 by Chano Flanagan RN  Outcome: Progressing  9/19/2022 0927 by Chano Flanagan RN  Outcome: Progressing  9/19/2022 0704 by Chano Flanagan RN  Outcome: Progressing     Problem: SAFETY ADULT  Goal: Patient will remain free of falls  Description: INTERVENTIONS:  - Educate patient/family on patient safety including physical limitations  - Instruct patient to call for assistance with activity   - Consult OT/PT to assist with strengthening/mobility   - Keep Call bell within reach  - Keep bed low and locked with side rails adjusted as appropriate  - Keep care items and personal belongings within reach  - Initiate and maintain comfort rounds  - Make Fall Risk Sign visible to staff  - Offer Toileting every 2 Hours, in advance of need  - Initiate/Maintain bed alarm  - Obtain necessary fall risk management equipment  - Apply yellow socks and bracelet for high fall risk patients  - Consider moving patient to room near nurses station  9/19/2022 0927 by Suzy Walls RN  Outcome: Progressing  9/19/2022 0927 by Suzy Walls RN  Outcome: Progressing  9/19/2022 0704 by Suzy Walls RN  Outcome: Progressing  Goal: Maintain or return to baseline ADL function  Description: INTERVENTIONS:  -  Assess patient's ability to carry out ADLs; assess patient's baseline for ADL function and identify physical deficits which impact ability to perform ADLs (bathing, care of mouth/teeth, toileting, grooming, dressing, etc )  - Assess/evaluate cause of self-care deficits   - Assess range of motion  - Assess patient's mobility; develop plan if impaired  - Assess patient's need for assistive devices and provide as appropriate  - Encourage maximum independence but intervene and supervise when necessary  - Involve family in performance of ADLs  - Assess for home care needs following discharge   - Consider OT consult to assist with ADL evaluation and planning for discharge  - Provide patient education as appropriate  9/19/2022 0927 by Suzy Walls RN  Outcome: Progressing  9/19/2022 0927 by Suzy Walls RN  Outcome: Progressing  9/19/2022 0704 by Suzy Walls RN  Outcome: Progressing  Goal: Maintains/Returns to pre admission functional level  Description: INTERVENTIONS:  - Perform BMAT or MOVE assessment daily    - Set and communicate daily mobility goal to care team and patient/family/caregiver     - Collaborate with rehabilitation services on mobility goals if consulted  - Perform Range of Motion 4 times a day  - Reposition patient every 2 hours  - Dangle patient 3 times a day  - Stand patient 3 times a day  - Ambulate patient 3 times a day  - Out of bed to chair 3 times a day   - Out of bed for meals 3 times a day  - Out of bed for toileting  - Record patient progress and toleration of activity level   9/19/2022 0927 by Blaine Napier RN  Outcome: Progressing  9/19/2022 0927 by Blaine Napier RN  Outcome: Progressing  9/19/2022 0704 by Blaine Napier RN  Outcome: Progressing     Problem: DISCHARGE PLANNING  Goal: Discharge to home or other facility with appropriate resources  Description: INTERVENTIONS:  - Identify barriers to discharge w/patient and caregiver  - Arrange for needed discharge resources and transportation as appropriate  - Identify discharge learning needs (meds, wound care, etc )  - Arrange for interpretive services to assist at discharge as needed  - Refer to Case Management Department for coordinating discharge planning if the patient needs post-hospital services based on physician/advanced practitioner order or complex needs related to functional status, cognitive ability, or social support system  9/19/2022 0927 by Blaine Napier RN  Outcome: Progressing  9/19/2022 0927 by Blaine Napier RN  Outcome: Progressing  9/19/2022 0704 by Blaine Napier RN  Outcome: Progressing     Problem: Knowledge Deficit  Goal: Patient/family/caregiver demonstrates understanding of disease process, treatment plan, medications, and discharge instructions  Description: Complete learning assessment and assess knowledge base    Interventions:  - Provide teaching at level of understanding  - Provide teaching via preferred learning methods  9/19/2022 0927 by Blaine Napier RN  Outcome: Progressing  9/19/2022 0927 by Blaine Napier RN  Outcome: Progressing  9/19/2022 0704 by Blaine Napier RN  Outcome: Progressing

## 2022-09-19 NOTE — ASSESSMENT & PLAN NOTE
Lab Results   Component Value Date    HGBA1C 6 3 (H) 08/05/2022       Recent Labs     09/18/22  1618 09/18/22  2115 09/19/22  0115 09/19/22  0558   POCGLU 147* 118 100 88       Blood Sugar Average: Last 72 hrs:  (P) 584 [id number]   · Hold lantus with DORA/hypoglycemia  · SSI

## 2022-09-19 NOTE — PLAN OF CARE
Problem: Potential for Falls  Goal: Patient will remain free of falls  Description: INTERVENTIONS:  - Educate patient/family on patient safety including physical limitations  - Instruct patient to call for assistance with activity   - Consult OT/PT to assist with strengthening/mobility   - Keep Call bell within reach  - Keep bed low and locked with side rails adjusted as appropriate  - Keep care items and personal belongings within reach  - Initiate and maintain comfort rounds  - Make Fall Risk Sign visible to staff  - Apply yellow socks and bracelet for high fall risk patients  - Consider moving patient to room near nurses station  Outcome: Progressing     Problem: MOBILITY - ADULT  Goal: Maintain or return to baseline ADL function  Description: INTERVENTIONS:  -  Assess patient's ability to carry out ADLs; assess patient's baseline for ADL function and identify physical deficits which impact ability to perform ADLs (bathing, care of mouth/teeth, toileting, grooming, dressing, etc )  - Assess/evaluate cause of self-care deficits   - Assess range of motion  - Assess patient's mobility; develop plan if impaired  - Assess patient's need for assistive devices and provide as appropriate  - Encourage maximum independence but intervene and supervise when necessary  - Involve family in performance of ADLs  - Assess for home care needs following discharge   - Consider OT consult to assist with ADL evaluation and planning for discharge  - Provide patient education as appropriate  Outcome: Progressing  Goal: Maintains/Returns to pre admission functional level  Description: INTERVENTIONS:  - Perform BMAT or MOVE assessment daily    - Set and communicate daily mobility goal to care team and patient/family/caregiver       - Out of bed for toileting  - Record patient progress and toleration of activity level   Outcome: Progressing     Problem: Prexisting or High Potential for Compromised Skin Integrity  Goal: Skin integrity is maintained or improved  Description: INTERVENTIONS:  - Identify patients at risk for skin breakdown  - Assess and monitor skin integrity  - Assess and monitor nutrition and hydration status  - Monitor labs   - Assess for incontinence   - Turn and reposition patient  - Assist with mobility/ambulation  - Relieve pressure over bony prominences  - Avoid friction and shearing  - Provide appropriate hygiene as needed including keeping skin clean and dry  - Evaluate need for skin moisturizer/barrier cream  - Collaborate with interdisciplinary team   - Patient/family teaching  - Consider wound care consult   Outcome: Progressing     Problem: Nutrition/Hydration-ADULT  Goal: Nutrient/Hydration intake appropriate for improving, restoring or maintaining nutritional needs  Description: Monitor and assess patient's nutrition/hydration status for malnutrition  Collaborate with interdisciplinary team and initiate plan and interventions as ordered  Monitor patient's weight and dietary intake as ordered or per policy  Utilize nutrition screening tool and intervene as necessary  Determine patient's food preferences and provide high-protein, high-caloric foods as appropriate       INTERVENTIONS:  - Monitor oral intake, urinary output, labs, and treatment plans  - Assess nutrition and hydration status and recommend course of action  - Evaluate amount of meals eaten  - Assist patient with eating if necessary   - Allow adequate time for meals  - Recommend/ encourage appropriate diets, oral nutritional supplements, and vitamin/mineral supplements  - Order, calculate, and assess calorie counts as needed  - Recommend, monitor, and adjust tube feedings and TPN/PPN based on assessed needs  - Assess need for intravenous fluids  - Provide specific nutrition/hydration education as appropriate  - Include patient/family/caregiver in decisions related to nutrition  Outcome: Progressing     Problem: GASTROINTESTINAL - ADULT  Goal: Minimal or absence of nausea and/or vomiting  Description: INTERVENTIONS:  - Administer IV fluids if ordered to ensure adequate hydration  - Maintain NPO status until nausea and vomiting are resolved  - Nasogastric tube if ordered  - Administer ordered antiemetic medications as needed  - Provide nonpharmacologic comfort measures as appropriate  - Advance diet as tolerated, if ordered  - Consider nutrition services referral to assist patient with adequate nutrition and appropriate food choices  Outcome: Progressing  Goal: Maintains or returns to baseline bowel function  Description: INTERVENTIONS:  - Assess bowel function  - Encourage oral fluids to ensure adequate hydration  - Administer IV fluids if ordered to ensure adequate hydration  - Administer ordered medications as needed  - Encourage mobilization and activity  - Consider nutritional services referral to assist patient with adequate nutrition and appropriate food choices  Outcome: Progressing  Goal: Maintains adequate nutritional intake  Description: INTERVENTIONS:  - Monitor percentage of each meal consumed  - Identify factors contributing to decreased intake, treat as appropriate  - Assist with meals as needed  - Monitor I&O, weight, and lab values if indicated  - Obtain nutrition services referral as needed  Outcome: Progressing     Problem: GENITOURINARY - ADULT  Goal: Maintains or returns to baseline urinary function  Description: INTERVENTIONS:  - Assess urinary function  - Encourage oral fluids to ensure adequate hydration if ordered  - Administer IV fluids as ordered to ensure adequate hydration  - Administer ordered medications as needed  - Offer frequent toileting  - Follow urinary retention protocol if ordered  Outcome: Progressing  Goal: Absence of urinary retention  Description: INTERVENTIONS:  - Assess patients ability to void and empty bladder  - Monitor I/O  - Bladder scan as needed  - Discuss with physician/AP medications to alleviate retention as needed  - Discuss catheterization for long term situations as appropriate  Outcome: Progressing  Goal: Urinary catheter remains patent  Description: INTERVENTIONS:  - Assess patency of urinary catheter  - If patient has a chronic mehta, consider changing catheter if non-functioning  - Follow guidelines for intermittent irrigation of non-functioning urinary catheter  Outcome: Progressing     Problem: METABOLIC, FLUID AND ELECTROLYTES - ADULT  Goal: Electrolytes maintained within normal limits  Description: INTERVENTIONS:  - Monitor labs and assess patient for signs and symptoms of electrolyte imbalances  - Administer electrolyte replacement as ordered  - Monitor response to electrolyte replacements, including repeat lab results as appropriate  - Instruct patient on fluid and nutrition as appropriate  Outcome: Progressing  Goal: Fluid balance maintained  Description: INTERVENTIONS:  - Monitor labs   - Monitor I/O and WT  - Instruct patient on fluid and nutrition as appropriate  - Assess for signs & symptoms of volume excess or deficit  Outcome: Progressing     Problem: PAIN - ADULT  Goal: Verbalizes/displays adequate comfort level or baseline comfort level  Description: Interventions:  - Encourage patient to monitor pain and request assistance  - Assess pain using appropriate pain scale  - Administer analgesics based on type and severity of pain and evaluate response  - Implement non-pharmacological measures as appropriate and evaluate response  - Consider cultural and social influences on pain and pain management  - Notify physician/advanced practitioner if interventions unsuccessful or patient reports new pain  Outcome: Progressing     Problem: INFECTION - ADULT  Goal: Absence or prevention of progression during hospitalization  Description: INTERVENTIONS:  - Assess and monitor for signs and symptoms of infection  - Monitor lab/diagnostic results  - Monitor all insertion sites, i e  indwelling lines, tubes, and drains  - Monitor endotracheal if appropriate and nasal secretions for changes in amount and color  - Julian appropriate cooling/warming therapies per order  - Administer medications as ordered  - Instruct and encourage patient and family to use good hand hygiene technique  - Identify and instruct in appropriate isolation precautions for identified infection/condition  Outcome: Progressing  Goal: Absence of fever/infection during neutropenic period  Description: INTERVENTIONS:  - Monitor WBC    Outcome: Progressing     Problem: SAFETY ADULT  Goal: Patient will remain free of falls  Description: INTERVENTIONS:  - Educate patient/family on patient safety including physical limitations  - Instruct patient to call for assistance with activity   - Consult OT/PT to assist with strengthening/mobility   - Keep Call bell within reach  - Keep bed low and locked with side rails adjusted as appropriate  - Keep care items and personal belongings within reach  - Initiate and maintain comfort rounds  - Make Fall Risk Sign visible to staff    - Apply yellow socks and bracelet for high fall risk patients  - Consider moving patient to room near nurses station  Outcome: Progressing  Goal: Maintain or return to baseline ADL function  Description: INTERVENTIONS:  -  Assess patient's ability to carry out ADLs; assess patient's baseline for ADL function and identify physical deficits which impact ability to perform ADLs (bathing, care of mouth/teeth, toileting, grooming, dressing, etc )  - Assess/evaluate cause of self-care deficits   - Assess range of motion  - Assess patient's mobility; develop plan if impaired  - Assess patient's need for assistive devices and provide as appropriate  - Encourage maximum independence but intervene and supervise when necessary  - Involve family in performance of ADLs  - Assess for home care needs following discharge   - Consider OT consult to assist with ADL evaluation and planning for discharge  - Provide patient education as appropriate  Outcome: Progressing  Goal: Maintains/Returns to pre admission functional level  Description: INTERVENTIONS:  - Perform BMAT or MOVE assessment daily    - Set and communicate daily mobility goal to care team and patient/family/caregiver  - Collaborate with rehabilitation services on mobility goals if consulted    - Out of bed for toileting  - Record patient progress and toleration of activity level   Outcome: Progressing     Problem: DISCHARGE PLANNING  Goal: Discharge to home or other facility with appropriate resources  Description: INTERVENTIONS:  - Identify barriers to discharge w/patient and caregiver  - Arrange for needed discharge resources and transportation as appropriate  - Identify discharge learning needs (meds, wound care, etc )  - Arrange for interpretive services to assist at discharge as needed  - Refer to Case Management Department for coordinating discharge planning if the patient needs post-hospital services based on physician/advanced practitioner order or complex needs related to functional status, cognitive ability, or social support system  Outcome: Progressing     Problem: Knowledge Deficit  Goal: Patient/family/caregiver demonstrates understanding of disease process, treatment plan, medications, and discharge instructions  Description: Complete learning assessment and assess knowledge base    Interventions:  - Provide teaching at level of understanding  - Provide teaching via preferred learning methods  Outcome: Progressing

## 2022-09-19 NOTE — PROGRESS NOTES
Progress Note - Cardiology   Naima Salcido 81 y o  male MRN: [id number]  Unit/Bed#: -01 Encounter: [id number]        Problem List:  Principal Problem:    DORA (acute kidney injury) [facility])  Active Problems:    Diarrhea    Chronic atrial fibrillation (HCC)    Morbid obesity ([address] )    Chronic combined systolic and diastolic congestive heart failure (HCC)    GERD (gastroesophageal reflux disease)    Hyperlipidemia    NALLELY (obstructive sleep apnea)    Diabetes mellitus, type 2 (HCC)    Multiple myeloma (HCC)    Chronic obstructive pulmonary disease, unspecified (HCC)    Gout    Ambulatory dysfunction    Anemia    Supratherapeutic INR    Acute diverticulitis    Abnormal CT scan    Hypokalemia due to excessive gastrointestinal loss of potassium      Asessment:  1  Atrial fibrillation with slow ventricular response  1  Telemetry reviewed showing atrial fibrillation with slow ventricular response, longest pause of approximately 3 9 seconds occurring at 0237 on 09/19/2022  2  Abdominal pain, acute diverticulitis  3  Acute on chronic kidney disease, now receiving dialysis started 09/16/2022  4  Chronic atrial fibrillation  a  On warfarin for CVA risk reduction  5  Cardiomyopathy, unspecified type  a  Echocardiograms from 2015, 2016, in 2019 showing EF 55-60% with grade 2 diastolic dysfunction  b  Echo 10/05/2021:  EF 45% with regional wall motion abnormalities, mild MR  c  Nuclear stress test 10/15/2021:  Nondiagnostic EKG, small fixed basal to inferior defect suspect due to diaphragmatic attenuation with no definitive evidence of ischemia, EF read as 36%  6  Chronic combined systolic and diastolic heart failure  7  Hypertension  8  Obstructive sleep apnea  9  Dyslipidemia  10  Take 2 diabetes  11  Multiple myeloma  a  Seen by Heme-Onc July 2022 noting plan for repeat blood work to re-evaluate his myeloma  b  Receiving weekly Chemo therapy (Velcade and Decadron)  12  Chronic anemia  13   Peripheral vascular disease, history of left AKA      Plan/ Discussion:   Telemetry reviewed he remains in atrial fibrillation with heart rate generally trending in the 50s  He does have some pauses overnight the longest which I could find was 3 9 seconds  He did have some hypotension on the evening of 09/17/2022 however since that time has been normotensive even when heart rate drops into the 30s at night   At this juncture no indication for pacing   If positive should worsen or he would become hemodynamically unstable then he would need a pacemaker   If rate control becomes an issue and he cannot tolerate AV neris blocking agents this would also likely require pacemaker   Volume management via dialysis   Warfarin for CVA risk reduction    Subjective:  Feeling okay    No specific complaints    Vitals:  Vitals:    09/18/22 0544 09/19/22 0600   Weight: 120 kg (263 lb 14 3 oz) 123 kg (271 lb 6 2 oz)   ,  Vitals:    09/18/22 2236 09/19/22 0432 09/19/22 0600 09/19/22 0742   BP: 141/64 149/58  159/70   BP Location: Right arm Right arm  Right arm   Pulse: 57 (!) 54  55   Resp: 14 21  18   Temp: 98 2 °F (36 8 °C) 97 9 °F (36 6 °C)  97 7 °F (36 5 °C)   TempSrc: Oral Oral  Oral   SpO2: 100% 100%  98%   Weight:   123 kg (271 lb 6 2 oz)    Height:           Exam:  General: Alert awake and oriented, no acute distress  Heart:  Rhythm is irregular rate is bradycardic, no murmurs, Normal S1, no edema    Respiratory effort/ Lungs:  Breathing comfortably on room air, clear bilaterally without wheezing, rales, crackles   Abdominal: Non-tender to palpation, + bowel sounds, soft, no masses or distension  Lower Limbs:  No edema            Telemetry:       Atrial fibrillation, Heart Rate 50s    Medications:    Current Facility-Administered Medications:     acetaminophen (TYLENOL) tablet 650 mg, 650 mg, Oral, Q6H PRN, Vicky Stauffer PA-C    albuterol (PROVENTIL HFA,VENTOLIN HFA) inhaler 2 puff, 2 puff, Inhalation, Q6H PRN, Vicky Stauffer PA-C    allopurinol (ZYLOPRIM) tablet 300 mg, 300 mg, Oral, Daily, Chao Redman PA-C, 300 mg at 09/19/22 0804    atorvastatin (LIPITOR) tablet 40 mg, 40 mg, Oral, After Dinner, LESLEY Duffy-C, 40 mg at 09/17/22 1746    cholestyramine sugar free (QUESTRAN LIGHT) packet 4 g, 4 g, Oral, BID, Chao Redman PA-C, 4 g at 09/19/22 0805    clotrimazole (LOTRIMIN) 1 % cream, , Topical, BID, LESLEY Duffy-C, Given at 09/19/22 0806    dicyclomine (BENTYL) capsule 10 mg, 10 mg, Oral, 4x Daily (AC & HS), Shabana Taylor MD, 10 mg at 09/19/22 0601    fluticasone-vilanterol (BREO ELLIPTA) 200-25 MCG/INH inhaler 1 puff, 1 puff, Inhalation, Daily, Chao Redman PA-C, 1 puff at 09/19/22 0805    insulin lispro (HumaLOG) 100 units/mL subcutaneous injection 1-6 Units, 1-6 Units, Subcutaneous, Q6H [address], 1 Units at 09/17/22 2351 **AND** Fingerstick Glucose (POCT), , , 4x Daily AC and at bedtime, Chao Redman PA-C    loperamide (IMODIUM) capsule 2 mg, 2 mg, Oral, 4x Daily PRN, LESLEY Duffy-C, 2 mg at 09/19/22 0808    melatonin tablet 6 mg, 6 mg, Oral, HS, Chao Redman, PA-C, 6 mg at 09/18/22 2154    pantoprazole (PROTONIX) EC tablet 40 mg, 40 mg, Oral, Early Morning, Chao Redman PA-C, 40 mg at 09/19/22 0601    piperacillin-tazobactam (ZOSYN) 3 375 g in sodium chloride 0 9 % 100 mL IVPB (EXTENDED-INFUSION), 3 375 g, Intravenous, Q12H, LESLEY Duffy-C, Stopped at 09/19/22 0901    torsemide (DEMADEX) tablet 40 mg, 40 mg, Oral, BID, Sandip Agreste, CRNP    warfarin (COUMADIN) tablet 2 mg, 2 mg, Oral, Daily (warfarin), Sandipellen Ramane, CRNP, 2 mg at 09/18/22 1708      Labs/Data:        Results from last 7 days   Lab Units 09/19/22  0442 09/18/22  0511 09/17/22  0454   WBC Thousand/uL 4 44 4 12* 5 47   HEMOGLOBIN g/dL 7 5* 7 0* 7 5*   HEMATOCRIT % 24 0* 22 1* 23 5*   PLATELETS Thousands/uL 254 203 189     Results from last 7 days   Lab Units 09/19/22  0442 09/18/22  0511 09/17/22  1135   POTASSIUM mmol/L 3 5 3 3* 3 4*   CHLORIDE mmol/L 104 104 102 CO2 mmol/L 23 25 28   BUN mg/dL 39* 32* 34*

## 2022-09-19 NOTE — ASSESSMENT & PLAN NOTE
· Follows with Dr Barnes Room  · On Velcade and decadron last tx 9/6  · Free light chains pending outpt f/u

## 2022-09-20 LAB
ALBUMIN SERPL ELPH-MCNC: 2.49 G/DL (ref 3.5–5)
ALBUMIN SERPL ELPH-MCNC: 52.9 % (ref 52–65)
ALPHA1 GLOB SERPL ELPH-MCNC: 0.48 G/DL (ref 0.1–0.4)
ALPHA1 GLOB SERPL ELPH-MCNC: 10.2 % (ref 2.5–5)
ALPHA2 GLOB SERPL ELPH-MCNC: 0.8 G/DL (ref 0.4–1.2)
ALPHA2 GLOB SERPL ELPH-MCNC: 17 % (ref 7–13)
ANION GAP SERPL CALCULATED.3IONS-SCNC: 11 MMOL/L (ref 4–13)
BETA GLOB ABNORMAL SERPL ELPH-MCNC: 0.25 G/DL (ref 0.4–0.8)
BETA1 GLOB SERPL ELPH-MCNC: 5.3 % (ref 5–13)
BETA2 GLOB SERPL ELPH-MCNC: 5.6 % (ref 2–8)
BETA2+GAMMA GLOB SERPL ELPH-MCNC: 0.26 G/DL (ref 0.2–0.5)
BUN SERPL-MCNC: 19 MG/DL (ref 5–25)
CALCIUM SERPL-MCNC: 8.3 MG/DL (ref 8.3–10.1)
CHLORIDE SERPL-SCNC: 103 MMOL/L (ref 96–108)
CO2 SERPL-SCNC: 26 MMOL/L (ref 21–32)
CREAT SERPL-MCNC: 4.52 MG/DL (ref 0.6–1.3)
GAMMA GLOB ABNORMAL SERPL ELPH-MCNC: 0.42 G/DL (ref 0.5–1.6)
GAMMA GLOB SERPL ELPH-MCNC: 9 % (ref 12–22)
GFR SERPL CREATININE-BSD FRML MDRD: 12 ML/MIN/1.73SQ M
GLUCOSE SERPL-MCNC: 103 MG/DL (ref 65–140)
GLUCOSE SERPL-MCNC: 108 MG/DL (ref 65–140)
GLUCOSE SERPL-MCNC: 118 MG/DL (ref 65–140)
GLUCOSE SERPL-MCNC: 135 MG/DL (ref 65–140)
GLUCOSE SERPL-MCNC: 220 MG/DL (ref 65–140)
GLUCOSE SERPL-MCNC: 90 MG/DL (ref 65–140)
IGG/ALB SER: 1.12 {RATIO} (ref 1.1–1.8)
INR PPP: 2.1 (ref 0.84–1.19)
KAPPA LC FREE SER-MCNC: 62 MG/L (ref 3.3–19.4)
KAPPA LC FREE/LAMBDA FREE SER: 1.54 {RATIO} (ref 0.26–1.65)
LAMBDA LC FREE SERPL-MCNC: 40.3 MG/L (ref 5.7–26.3)
Lab: NORMAL
Lab: NORMAL
MISCELLANEOUS LAB TEST RESULT: NORMAL
POTASSIUM SERPL-SCNC: 3.6 MMOL/L (ref 3.5–5.3)
PROT PATTERN SERPL ELPH-IMP: ABNORMAL
PROT SERPL-MCNC: 4.7 G/DL (ref 6.4–8.2)
PROTHROMBIN TIME: 24.7 SECONDS (ref 11.6–14.5)
SCAN RESULT: NORMAL
SODIUM SERPL-SCNC: 140 MMOL/L (ref 135–147)

## 2022-09-20 PROCEDURE — 80048 BASIC METABOLIC PNL TOTAL CA: CPT | Performed by: PHYSICIAN ASSISTANT

## 2022-09-20 PROCEDURE — 82948 REAGENT STRIP/BLOOD GLUCOSE: CPT

## 2022-09-20 PROCEDURE — 99232 SBSQ HOSP IP/OBS MODERATE 35: CPT | Performed by: STUDENT IN AN ORGANIZED HEALTH CARE EDUCATION/TRAINING PROGRAM

## 2022-09-20 PROCEDURE — 99232 SBSQ HOSP IP/OBS MODERATE 35: CPT | Performed by: INTERNAL MEDICINE

## 2022-09-20 PROCEDURE — 84165 PROTEIN E-PHORESIS SERUM: CPT | Performed by: PATHOLOGY

## 2022-09-20 PROCEDURE — 99231 SBSQ HOSP IP/OBS SF/LOW 25: CPT | Performed by: REGISTERED NURSE

## 2022-09-20 PROCEDURE — 85610 PROTHROMBIN TIME: CPT | Performed by: PHYSICIAN ASSISTANT

## 2022-09-20 RX ORDER — INSULIN LISPRO 100 [IU]/ML
1-6 INJECTION, SOLUTION INTRAVENOUS; SUBCUTANEOUS
Status: DISCONTINUED | OUTPATIENT
Start: 2022-09-20 | End: 2022-10-04 | Stop reason: HOSPADM

## 2022-09-20 RX ADMIN — PIPERACILLIN AND TAZOBACTAM 3.38 G: 3; .375 INJECTION, POWDER, LYOPHILIZED, FOR SOLUTION INTRAVENOUS at 14:53

## 2022-09-20 RX ADMIN — CLOTRIMAZOLE: 0.01 CREAM TOPICAL at 10:51

## 2022-09-20 RX ADMIN — LOPERAMIDE HYDROCHLORIDE 2 MG: 2 CAPSULE ORAL at 14:53

## 2022-09-20 RX ADMIN — CLOTRIMAZOLE: 0.01 CREAM TOPICAL at 17:41

## 2022-09-20 RX ADMIN — PIPERACILLIN AND TAZOBACTAM 3.38 G: 3; .375 INJECTION, POWDER, LYOPHILIZED, FOR SOLUTION INTRAVENOUS at 02:49

## 2022-09-20 RX ADMIN — DICYCLOMINE HYDROCHLORIDE 10 MG: 10 CAPSULE ORAL at 21:04

## 2022-09-20 RX ADMIN — PANTOPRAZOLE SODIUM 40 MG: 40 TABLET, DELAYED RELEASE ORAL at 06:10

## 2022-09-20 RX ADMIN — TORSEMIDE 40 MG: 20 TABLET ORAL at 10:51

## 2022-09-20 RX ADMIN — TORSEMIDE 40 MG: 20 TABLET ORAL at 17:41

## 2022-09-20 RX ADMIN — DICYCLOMINE HYDROCHLORIDE 10 MG: 10 CAPSULE ORAL at 06:10

## 2022-09-20 RX ADMIN — INSULIN LISPRO 2 UNITS: 100 INJECTION, SOLUTION INTRAVENOUS; SUBCUTANEOUS at 17:41

## 2022-09-20 RX ADMIN — FLUTICASONE FUROATE AND VILANTEROL TRIFENATATE 1 PUFF: 200; 25 POWDER RESPIRATORY (INHALATION) at 10:52

## 2022-09-20 RX ADMIN — ATORVASTATIN CALCIUM 40 MG: 40 TABLET, FILM COATED ORAL at 17:41

## 2022-09-20 RX ADMIN — WARFARIN SODIUM 2 MG: 2 TABLET ORAL at 17:41

## 2022-09-20 RX ADMIN — DICYCLOMINE HYDROCHLORIDE 10 MG: 10 CAPSULE ORAL at 15:04

## 2022-09-20 RX ADMIN — Medication 6 MG: at 21:04

## 2022-09-20 RX ADMIN — CHOLESTYRAMINE 4 G: 4 POWDER, FOR SUSPENSION ORAL at 10:51

## 2022-09-20 RX ADMIN — CHOLESTYRAMINE 4 G: 4 POWDER, FOR SUSPENSION ORAL at 17:41

## 2022-09-20 RX ADMIN — LOPERAMIDE HYDROCHLORIDE 2 MG: 2 CAPSULE ORAL at 10:51

## 2022-09-20 RX ADMIN — DICYCLOMINE HYDROCHLORIDE 10 MG: 10 CAPSULE ORAL at 10:51

## 2022-09-20 RX ADMIN — ALLOPURINOL 300 MG: 300 TABLET ORAL at 10:51

## 2022-09-20 NOTE — PROGRESS NOTES
[place]  Progress Note - Radha Barcenas 1950, 81 y o  male MRN: [id number]  Unit/Bed#: -01 Encounter: [id number]  Primary Care Provider: Ab Cummins MD   Date and time admitted to hospital: 9/12/2022  4:09 PM    * DORA (acute kidney injury) [facility])  Assessment & Plan  · DORA on CKD 3  · Most recent d/c creat 3-3 7 prior in 2 range  · CT no hydro, nonobstructing calculi  · UA neg  · Nephrology following  · 9/15 R tunneled HD cath placed  · 9/15 and 9/16 IHD  · Monitor for renal recovery remains oliguric  · S/p IHD on 9/19   · Resume demadex    Acute diverticulitis  Assessment & Plan  · 2nd episode in one month (tx with 10 days antibx 8/2-8/11)  · CTAP acute diverticulitis  · GI following  · 7/25 Colonoscopy--2 polyps removed, Mild diverticula in the descending colon and sigmoid colon, Small, internal hemorrhoids  · Zosyn D 8/10  · Questran started 9/17   · Imodium PRN  · Bentyl added 9/18 by GI  · Monitor rectal tube output  · advance diet per gi recommendations    Diarrhea  Assessment & Plan  · noninfectious  · 650 out  · Rectal tube in place consider d/c with decreased outpt  · Cdiff/PCR neg  · GI following  · Questran/Bentyl/PRN imodium    Hypokalemia due to excessive gastrointestinal loss of potassium  Assessment & Plan  · Replete k/mg and recheck    Abnormal CT scan  Assessment & Plan  · CTAP-common bile duct stent with pneumobilia and air within gallblader  Distended gallbladder with cholelithiasis  · RUQ u/s distended gallbladder with air within stent in CBD similar to CT  · Surgery consulted  If concern for acute cholecystitis not surgical candidate would need per vanessa tube  · GI following  · ERCP 8/29/2022, 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, biopsy obtained   ERCP performed with endoscopic mucosal resection of abnormal looking ampullary tissue, PD, CBD stent placed    · No s/s acute cholecystitis monitor     Anemia  Assessment & Plan  · Baseline hgb 8  · Receives IV venofer  · Transfuse for hgb <7    Ambulatory dysfunction  Assessment & Plan  · 2/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 AKA  · PT/OT    Chronic obstructive pulmonary disease, unspecified (HCC)  Assessment & Plan  · Home inhaler  · Not AE    Multiple myeloma ([address] )  Assessment & Plan  · Follows with Dr Willi Cooper  · On Velcade and decadron last tx 9/6  · Free light chains pending outpt f/u    Diabetes mellitus, type 2 [facility])  Assessment & Plan  Lab Results   Component Value Date    HGBA1C 6 3 (H) 08/05/2022       Recent Labs     09/19/22  2057 09/20/22  0611 09/20/22  0648 09/20/22  1126   POCGLU 121 108 103 135       Blood Sugar Average: Last 72 hrs:  (P) 429 [id number]   · Hold lantus with DORA/hypoglycemia  · SSI    NALLELY (obstructive sleep apnea)  Assessment & Plan  · Refuses cpap    Chronic combined systolic and diastolic congestive heart failure (HCC)  Assessment & Plan  Wt Readings from Last 3 Encounters:   09/20/22 119 kg (261 lb 14 5 oz)   09/06/22 117 kg (257 lb 15 oz)   08/30/22 118 kg (260 lb)     · EF 45% decreased RV fx  · Appears euvolemic to dry prior weights noted 126kg  · Resume demadex  · Weight up 4kg IHD today for volume mgmnt  · I/O  · Daily weight  ·         Morbid obesity (HCC)  Assessment & Plan  · Dietary education    Chronic atrial fibrillation (HCC)  Assessment & Plan  · Bradycardia with Junctional escape beats 20's overnight with hypotension  · No rate control meds d/c in august due to bradycardia  · TSH 0 4  · Coumadin  · Cards consulted recommending CPAP HS and occurs with apnea episodes PT REFUSES          VTE Pharmacologic Prophylaxis: VTE Score: 6 High Risk (Score >/= 5) - Pharmacological DVT Prophylaxis Ordered: warfarin (Coumadin)  Sequential Compression Devices Ordered  Patient Centered Rounds: I performed bedside rounds with nursing staff today    Discussions with Specialists or Other Care Team Provider: nephro    Education and Discussions with Family / Patient: Plan discussed with patient, will update family  Time Spent for Care: 30 minutes  More than 50% of total time spent on counseling and coordination of care as described above  Current Length of Stay: 8 day(s)  Current Patient Status: Inpatient   Certification Statement: The patient will continue to require additional inpatient hospital stay due to rafaela, iv abx  Discharge Plan: Anticipate discharge in 48-72 hrs to prior assisted or independent living facility  Code Status: Level 1 - Full Code    Subjective:   Patient seen examined bedside  Admits to having diarrhea  Aside from diarrhea currently has no other acute complaints    Objective:     Vitals:   Temp (24hrs), Av 2 °F (36 8 °C), Min:97 7 °F (36 5 °C), Max:99 5 °F (37 5 °C)    Temp:  [97 7 °F (36 5 °C)-99 5 °F (37 5 °C)] 97 8 °F (36 6 °C)  HR:  [46-62] 61  Resp:  [14-21] 21  BP: (104-134)/(11-65) 113/64  SpO2:  [99 %-100 %] 100 %  Body mass index is 32 74 kg/m²  Input and Output Summary (last 24 hours): Intake/Output Summary (Last 24 hours) at 2022 1206  Last data filed at 2022 0800  Gross per 24 hour   Intake 1560 ml   Output 4200 ml   Net -2640 ml       Physical Exam:   Physical Exam  Vitals reviewed  HENT:      Head: Normocephalic and atraumatic  Right Ear: External ear normal       Left Ear: External ear normal       Nose: Nose normal       Mouth/Throat:      Mouth: Mucous membranes are moist       Pharynx: Oropharynx is clear  Eyes:      Extraocular Movements: Extraocular movements intact  Cardiovascular:      Rate and Rhythm: Normal rate and regular rhythm  Heart sounds: Normal heart sounds  Pulmonary:      Effort: Pulmonary effort is normal       Breath sounds: Normal breath sounds  Abdominal:      General: Abdomen is flat  Tenderness: There is no abdominal tenderness  Musculoskeletal:      Cervical back: Normal range of motion  Right lower leg: No edema  Left lower leg: No edema     Skin:     General: Skin is warm and dry    Neurological:      Mental Status: He is alert  Mental status is at baseline  Psychiatric:         Mood and Affect: Mood normal          Behavior: Behavior normal           Additional Data:     Labs:  Results from last 7 days   Lab Units 09/19/22  0442 09/18/22  0511   WBC Thousand/uL 4 44 4 12*   HEMOGLOBIN g/dL 7 5* 7 0*   HEMATOCRIT % 24 0* 22 1*   PLATELETS Thousands/uL 254 203   NEUTROS PCT %  --  54   LYMPHS PCT %  --  22   LYMPHO PCT % 0*  --    MONOS PCT %  --  14*   MONO PCT % 0*  --    EOS PCT % 0 8*     Results from last 7 days   Lab Units 09/20/22  0422 09/19/22  0442 09/18/22  0511   SODIUM mmol/L 140   < > 140   POTASSIUM mmol/L 3 6   < > 3 3*   CHLORIDE mmol/L 103   < > 104   CO2 mmol/L 26   < > 25   BUN mg/dL 19   < > 32*   CREATININE mg/dL 4 52*   < > 4 92*   ANION GAP mmol/L 11   < > 11   CALCIUM mg/dL 8 3   < > 7 6*   ALBUMIN g/dL  --   --  2 2*   TOTAL BILIRUBIN mg/dL  --   --  0 50   ALK PHOS U/L  --   --  87   ALT U/L  --   --  8*   AST U/L  --   --  14   GLUCOSE RANDOM mg/dL 90   < > 107    < > = values in this interval not displayed  Results from last 7 days   Lab Units 09/20/22  0422   INR  2 10*     Results from last 7 days   Lab Units 09/20/22  1126 09/20/22  0648 09/20/22  0611 09/19/22  2057 09/19/22  1546 09/19/22  1132 09/19/22  0558 09/19/22  0115 09/18/22  2115 09/18/22  1618 09/18/22  1109 09/18/22  0737   POC GLUCOSE mg/dl 135 103 108 121 103 194* 88 100 118 147* 137 119               Lines/Drains:  Invasive Devices  Report    Peripheral Intravenous Line  Duration           Peripheral IV 09/16/22 Distal;Left;Upper;Ventral (anterior) Arm 3 days    Peripheral IV 09/16/22 Distal;Right;Upper;Ventral (anterior) Arm 3 days    Peripheral IV 09/20/22 Dorsal (posterior); Right Forearm <1 day          Hemodialysis Catheter  Duration           HD Temporary Double Catheter 4 days          Drain  Duration           Rectal Tube With balloon 4 days Telemetry:  Telemetry Orders (From admission, onward)             48 Hour Telemetry Monitoring  Continuous x 48 hours        References:    Telemetry Guidelines   Question:  Reason for 48 Hour Telemetry  Answer:  Arrhythmias Requiring Medical Therapy (eg  SVT, Vtach/fib, Bradycardia, Uncontrolled A-fib)                 Telemetry Reviewed: Normal Sinus Rhythm  Indication for Continued Telemetry Use: Arrthymias requiring medical therapy             Imaging: Reviewed radiology reports from this admission including: ultrasound(s)    Recent Cultures (last 7 days):   Results from last 7 days   Lab Units 09/13/22  1708   C DIFF TOXIN B BY PCR  Negative       Last 24 Hours Medication List:   Current Facility-Administered Medications   Medication Dose Route Frequency Provider Last Rate    acetaminophen  650 mg Oral Q6H PRN Estrella Wood PA-C      albuterol  2 puff Inhalation Q6H PRN Estrella Wood PA-C      allopurinol  300 mg Oral Daily Estrella Wood PA-C      atorvastatin  40 mg Oral After Pearly SandJOEY leyva      cholestyramine sugar free  4 g Oral BID Estrella Wood PA-C      clotrimazole   Topical BID Estrella Wood PA-C      dicyclomine  10 mg Oral 4x Daily (AC & HS) Raquel De Leon MD      fluticasone-vilanterol  1 puff Inhalation Daily Estrella Wood PA-C      insulin lispro  1-6 Units Subcutaneous Q6H [address] Estrella Wood PA-C      loperamide  2 mg Oral 4x Daily PRN Estrella Wood PA-C      melatonin  6 mg Oral HS Estrella Wood PA-C      pantoprazole  40 mg Oral Early Morning Estrella Wood PA-C      piperacillin-tazobactam  3 375 g Intravenous Q12H Estrella Wood PA-C Stopped (09/20/22 7955)    torsemide  40 mg Oral BID NICOLA Guaman      warfarin  2 mg Oral Daily (warfarin) NICOLA Guaman          Today, Patient Was Seen By: Werner Nyhan, DO  x  **Please Note: This note may have been constructed using a voice recognition system  **

## 2022-09-20 NOTE — ASSESSMENT & PLAN NOTE
· Follows with Dr Jessica López  · On Velcade and decadron last tx 9/6  · Free light chains pending outpt f/u

## 2022-09-20 NOTE — PLAN OF CARE
Problem: Potential for Falls  Goal: Patient will remain free of falls  Description: INTERVENTIONS:  - Educate patient/family on patient safety including physical limitations  - Instruct patient to call for assistance with activity   - Consult OT/PT to assist with strengthening/mobility   - Keep Call bell within reach  - Keep bed low and locked with side rails adjusted as appropriate  - Keep care items and personal belongings within reach  - Initiate and maintain comfort rounds  - Make Fall Risk Sign visible to staff  - Apply yellow socks and bracelet for high fall risk patients  - Consider moving patient to room near nurses station  Outcome: Progressing     Problem: MOBILITY - ADULT  Goal: Maintain or return to baseline ADL function  Description: INTERVENTIONS:  -  Assess patient's ability to carry out ADLs; assess patient's baseline for ADL function and identify physical deficits which impact ability to perform ADLs (bathing, care of mouth/teeth, toileting, grooming, dressing, etc )  - Assess/evaluate cause of self-care deficits   - Assess range of motion  - Assess patient's mobility; develop plan if impaired  - Assess patient's need for assistive devices and provide as appropriate  - Encourage maximum independence but intervene and supervise when necessary  - Involve family in performance of ADLs  - Assess for home care needs following discharge   - Consider OT consult to assist with ADL evaluation and planning for discharge  - Provide patient education as appropriate  Outcome: Progressing  Goal: Maintains/Returns to pre admission functional level  Description: INTERVENTIONS:  - Perform BMAT or MOVE assessment daily    - Set and communicate daily mobility goal to care team and patient/family/caregiver     - Collaborate with rehabilitation services on mobility goals if consulted  - Out of bed for toileting  - Record patient progress and toleration of activity level   Outcome: Progressing     Problem: Prexisting or High Potential for Compromised Skin Integrity  Goal: Skin integrity is maintained or improved  Description: INTERVENTIONS:  - Identify patients at risk for skin breakdown  - Assess and monitor skin integrity  - Assess and monitor nutrition and hydration status  - Monitor labs   - Assess for incontinence   - Turn and reposition patient  - Assist with mobility/ambulation  - Relieve pressure over bony prominences  - Avoid friction and shearing  - Provide appropriate hygiene as needed including keeping skin clean and dry  - Evaluate need for skin moisturizer/barrier cream  - Collaborate with interdisciplinary team   - Patient/family teaching  - Consider wound care consult   Outcome: Progressing     Problem: Nutrition/Hydration-ADULT  Goal: Nutrient/Hydration intake appropriate for improving, restoring or maintaining nutritional needs  Description: Monitor and assess patient's nutrition/hydration status for malnutrition  Collaborate with interdisciplinary team and initiate plan and interventions as ordered  Monitor patient's weight and dietary intake as ordered or per policy  Utilize nutrition screening tool and intervene as necessary  Determine patient's food preferences and provide high-protein, high-caloric foods as appropriate       INTERVENTIONS:  - Monitor oral intake, urinary output, labs, and treatment plans  - Assess nutrition and hydration status and recommend course of action  - Evaluate amount of meals eaten  - Assist patient with eating if necessary   - Allow adequate time for meals  - Recommend/ encourage appropriate diets, oral nutritional supplements, and vitamin/mineral supplements  - Order, calculate, and assess calorie counts as needed  - Recommend, monitor, and adjust tube feedings and TPN/PPN based on assessed needs  - Assess need for intravenous fluids  - Provide specific nutrition/hydration education as appropriate  - Include patient/family/caregiver in decisions related to nutrition  Outcome: Progressing     Problem: GASTROINTESTINAL - ADULT  Goal: Minimal or absence of nausea and/or vomiting  Description: INTERVENTIONS:  - Administer IV fluids if ordered to ensure adequate hydration  - Maintain NPO status until nausea and vomiting are resolved  - Nasogastric tube if ordered  - Administer ordered antiemetic medications as needed  - Provide nonpharmacologic comfort measures as appropriate  - Advance diet as tolerated, if ordered  - Consider nutrition services referral to assist patient with adequate nutrition and appropriate food choices  Outcome: Progressing  Goal: Maintains or returns to baseline bowel function  Description: INTERVENTIONS:  - Assess bowel function  - Encourage oral fluids to ensure adequate hydration  - Administer IV fluids if ordered to ensure adequate hydration  - Administer ordered medications as needed  - Encourage mobilization and activity  - Consider nutritional services referral to assist patient with adequate nutrition and appropriate food choices  Outcome: Progressing  Goal: Maintains adequate nutritional intake  Description: INTERVENTIONS:  - Monitor percentage of each meal consumed  - Identify factors contributing to decreased intake, treat as appropriate  - Assist with meals as needed  - Monitor I&O, weight, and lab values if indicated  - Obtain nutrition services referral as needed  Outcome: Progressing     Problem: GENITOURINARY - ADULT  Goal: Maintains or returns to baseline urinary function  Description: INTERVENTIONS:  - Assess urinary function  - Encourage oral fluids to ensure adequate hydration if ordered  - Administer IV fluids as ordered to ensure adequate hydration  - Administer ordered medications as needed  - Offer frequent toileting  - Follow urinary retention protocol if ordered  Outcome: Progressing  Goal: Absence of urinary retention  Description: INTERVENTIONS:  - Assess patients ability to void and empty bladder  - Monitor I/O  - Bladder scan as needed  - Discuss with physician/AP medications to alleviate retention as needed  - Discuss catheterization for long term situations as appropriate  Outcome: Progressing  Goal: Urinary catheter remains patent  Description: INTERVENTIONS:  - Assess patency of urinary catheter  - If patient has a chronic mehta, consider changing catheter if non-functioning  - Follow guidelines for intermittent irrigation of non-functioning urinary catheter  Outcome: Progressing     Problem: METABOLIC, FLUID AND ELECTROLYTES - ADULT  Goal: Electrolytes maintained within normal limits  Description: INTERVENTIONS:  - Monitor labs and assess patient for signs and symptoms of electrolyte imbalances  - Administer electrolyte replacement as ordered  - Monitor response to electrolyte replacements, including repeat lab results as appropriate  - Instruct patient on fluid and nutrition as appropriate  Outcome: Progressing  Goal: Fluid balance maintained  Description: INTERVENTIONS:  - Monitor labs   - Monitor I/O and WT  - Instruct patient on fluid and nutrition as appropriate  - Assess for signs & symptoms of volume excess or deficit  Outcome: Progressing     Problem: PAIN - ADULT  Goal: Verbalizes/displays adequate comfort level or baseline comfort level  Description: Interventions:  - Encourage patient to monitor pain and request assistance  - Assess pain using appropriate pain scale  - Administer analgesics based on type and severity of pain and evaluate response  - Implement non-pharmacological measures as appropriate and evaluate response  - Consider cultural and social influences on pain and pain management  - Notify physician/advanced practitioner if interventions unsuccessful or patient reports new pain  Outcome: Progressing     Problem: INFECTION - ADULT  Goal: Absence or prevention of progression during hospitalization  Description: INTERVENTIONS:  - Assess and monitor for signs and symptoms of infection  - Monitor lab/diagnostic results  - Monitor all insertion sites, i e  indwelling lines, tubes, and drains  - Monitor endotracheal if appropriate and nasal secretions for changes in amount and color  - [place] appropriate cooling/warming therapies per order  - Administer medications as ordered  - Instruct and encourage patient and family to use good hand hygiene technique  - Identify and instruct in appropriate isolation precautions for identified infection/condition  Outcome: Progressing  Goal: Absence of fever/infection during neutropenic period  Description: INTERVENTIONS:  - Monitor WBC    Outcome: Progressing     Problem: SAFETY ADULT  Goal: Patient will remain free of falls  Description: INTERVENTIONS:  - Educate patient/family on patient safety including physical limitations  - Instruct patient to call for assistance with activity   - Consult OT/PT to assist with strengthening/mobility   - Keep Call bell within reach  - Keep bed low and locked with side rails adjusted as appropriate  - Keep care items and personal belongings within reach  - Initiate and maintain comfort rounds  - Make Fall Risk Sign visible to staff  - Apply yellow socks and bracelet for high fall risk patients  - Consider moving patient to room near nurses station  Outcome: Progressing  Goal: Maintain or return to baseline ADL function  Description: INTERVENTIONS:  -  Assess patient's ability to carry out ADLs; assess patient's baseline for ADL function and identify physical deficits which impact ability to perform ADLs (bathing, care of mouth/teeth, toileting, grooming, dressing, etc )  - Assess/evaluate cause of self-care deficits   - Assess range of motion  - Assess patient's mobility; develop plan if impaired  - Assess patient's need for assistive devices and provide as appropriate  - Encourage maximum independence but intervene and supervise when necessary  - Involve family in performance of ADLs  - Assess for home care needs following discharge   - Consider OT consult to assist with ADL evaluation and planning for discharge  - Provide patient education as appropriate  Outcome: Progressing  Goal: Maintains/Returns to pre admission functional level  Description: INTERVENTIONS:  - Perform BMAT or MOVE assessment daily    - Set and communicate daily mobility goal to care team and patient/family/caregiver  - Collaborate with rehabilitation services on mobility goals if consulted  - Out of bed for toileting  - Record patient progress and toleration of activity level   Outcome: Progressing     Problem: DISCHARGE PLANNING  Goal: Discharge to home or other facility with appropriate resources  Description: INTERVENTIONS:  - Identify barriers to discharge w/patient and caregiver  - Arrange for needed discharge resources and transportation as appropriate  - Identify discharge learning needs (meds, wound care, etc )  - Arrange for interpretive services to assist at discharge as needed  - Refer to Case Management Department for coordinating discharge planning if the patient needs post-hospital services based on physician/advanced practitioner order or complex needs related to functional status, cognitive ability, or social support system  Outcome: Progressing     Problem: Knowledge Deficit  Goal: Patient/family/caregiver demonstrates understanding of disease process, treatment plan, medications, and discharge instructions  Description: Complete learning assessment and assess knowledge base    Interventions:  - Provide teaching at level of understanding  - Provide teaching via preferred learning methods  Outcome: Progressing

## 2022-09-20 NOTE — ASSESSMENT & PLAN NOTE
· Bradycardia with Junctional escape beats 20's overnight with hypotension  · No rate control meds d/c in august due to bradycardia  · TSH 0 4  · Coumadin   · INR today 2 1  · Cards consulted recommending CPAP HS and occurs with apnea episodes PT REFUSES

## 2022-09-20 NOTE — ASSESSMENT & PLAN NOTE
Lab Results   Component Value Date    HGBA1C 6 3 (H) 08/05/2022       Recent Labs     09/19/22 2057 09/20/22  0611 09/20/22  0648 09/20/22  1126   POCGLU 121 108 103 135       Blood Sugar Average: Last 72 hrs:  (P) 947 [id number]   · Hold lantus with DORA/hypoglycemia  · SSI

## 2022-09-20 NOTE — ASSESSMENT & PLAN NOTE
Wt Readings from Last 3 Encounters:   09/20/22 119 kg (261 lb 14 5 oz)   09/06/22 117 kg (257 lb 15 oz)   08/30/22 118 kg (260 lb)     · EF 45% decreased RV fx  · Appears euvolemic to dry prior weights noted 126kg  · Resume demadex  · Weight up 4kg IHD today for volume mgmnt  · I/O  · Daily weight  ·

## 2022-09-20 NOTE — ASSESSMENT & PLAN NOTE
· DORA on CKD 3  · Most recent d/c creat 3-3 7 prior in 2 range  · CT no hydro, nonobstructing calculi  · UA neg  · Nephrology following  · 9/15 R tunneled HD cath placed  · 9/15 and 9/16 IHD  · Monitor for renal recovery remains oliguric  · S/p IHD on 9/19   · Resume demadex

## 2022-09-20 NOTE — ASSESSMENT & PLAN NOTE
· 2nd episode in one month (tx with 10 days antibx 8/2-8/11)  · CTAP acute diverticulitis  · GI following  · 7/25 Colonoscopy--2 polyps removed, Mild diverticula in the descending colon and sigmoid colon, Small, internal hemorrhoids  · Zosyn D 8/10  · Questran started 9/17   · Imodium PRN  · Bentyl added 9/18 by GI  · Monitor rectal tube output  · advance diet per gi recommendations

## 2022-09-20 NOTE — PROGRESS NOTES
Progress note - Gastroenterology   Pankaj Rees 81 y o  male MRN: [id number]  Unit/Bed#: -01 Encounter: [id number]    ASSESSMENT and PLAN    Abdominal pain/acute sigmoid diverticulitis - pain finally improved   Unclear if this is just improvement of diverticulitis or decrease in bowel spasm with dicyclomine      Abdominal pain clinically improved  · Full liquid diet  slowly advance as tolerated  · Continue  antibiotics  · Continue dicyclomine     Diarrhea - C diff and culture negative   Suspect secondary to antibiotics    · Continue cholestyramine and  dicyclomine      Chief Complaint   Patient presents with    Abnormal Lab     Patient arrives via EMS from [facility] for an elevated creatinine of 7 3  Reports yellow emesis for a few days  Patient has RUQ and RLQ abdominal pain, is currently getting chemo once a week for kidney cancer  SUBJECTIVE/HPI   Pain much improved  Tolerating clear liquid diet  Still with diarrhea and rectal tube in place      /53 (BP Location: Right arm)   Pulse 61   Temp 97 8 °F (36 6 °C) (Axillary)   Resp 18   Ht 6' 3" (1 905 m)   Wt 119 kg (261 lb 14 5 oz)   SpO2 99%   BMI 32 74 kg/m²     PHYSICALEXAM  General appearance: alert, appears stated age and cooperative  Eyes: PERLLA, EOMI, no icterus   Head: Normocephalic, without obvious abnormality, atraumatic  Lungs: clear to auscultation bilaterally  Heart: regular rate and rhythm, S1, S2 normal, no murmur, click, rub or gallop  Abdomen: soft, non-tender; bowel sounds normal; no masses,  no organomegaly  Extremities: extremities normal, atraumatic, no cyanosis or edema  Neurologic: Grossly normal    Lab Results   Component Value Date    GLUCOSE 64 (L) 08/01/2022    CALCIUM 8 3 09/20/2022     01/15/2018    K 3 6 09/20/2022    CO2 26 09/20/2022     09/20/2022    BUN 19 09/20/2022    CREATININE 4 52 (H) 09/20/2022     Lab Results   Component Value Date    WBC 4 44 09/19/2022    HGB 7 5 (L) 09/19/2022    HCT 24 0 (L) 09/19/2022    MCV 85 09/19/2022     09/19/2022     Lab Results   Component Value Date    ALT 8 (L) 09/18/2022    AST 14 09/18/2022     (H) 11/03/2019    ALKPHOS 87 09/18/2022    BILITOT 0 6 01/15/2018     No results found for: AMYLASE  Lab Results   Component Value Date    LIPASE 691 (H) 09/13/2022     Lab Results   Component Value Date    IRON 17 (L) 08/04/2022    TIBC 206 (L) 08/04/2022    FERRITIN 165 08/04/2022     Lab Results   Component Value Date    INR 2 10 (H) 09/20/2022

## 2022-09-20 NOTE — ASSESSMENT & PLAN NOTE
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

## 2022-09-20 NOTE — PLAN OF CARE
Problem: Potential for Falls  Goal: Patient will remain free of falls  Description: INTERVENTIONS:  - Educate patient/family on patient safety including physical limitations  - Instruct patient to call for assistance with activity   - Consult OT/PT to assist with strengthening/mobility   - Keep Call bell within reach  - Keep bed low and locked with side rails adjusted as appropriate  - Keep care items and personal belongings within reach  - Initiate and maintain comfort rounds  - Make Fall Risk Sign visible to staff  - Offer Toileting every 2 Hours, in advance of need  - Initiate/Maintain bed alarm  - Obtain necessary fall risk management equipment  - Apply yellow socks and bracelet for high fall risk patients  - Consider moving patient to room near nurses station  Outcome: Progressing     Problem: MOBILITY - ADULT  Goal: Maintain or return to baseline ADL function  Description: INTERVENTIONS:  -  Assess patient's ability to carry out ADLs; assess patient's baseline for ADL function and identify physical deficits which impact ability to perform ADLs (bathing, care of mouth/teeth, toileting, grooming, dressing, etc )  - Assess/evaluate cause of self-care deficits   - Assess range of motion  - Assess patient's mobility; develop plan if impaired  - Assess patient's need for assistive devices and provide as appropriate  - Encourage maximum independence but intervene and supervise when necessary  - Involve family in performance of ADLs  - Assess for home care needs following discharge   - Consider OT consult to assist with ADL evaluation and planning for discharge  - Provide patient education as appropriate  Outcome: Progressing  Goal: Maintains/Returns to pre admission functional level  Description: INTERVENTIONS:  - Perform BMAT or MOVE assessment daily    - Set and communicate daily mobility goal to care team and patient/family/caregiver     - Collaborate with rehabilitation services on mobility goals if consulted  - Perform Range of Motion 3 times a day  - Reposition patient every 2 hours  - Record patient progress and toleration of activity level   Outcome: Progressing     Problem: Prexisting or High Potential for Compromised Skin Integrity  Goal: Skin integrity is maintained or improved  Description: INTERVENTIONS:  - Identify patients at risk for skin breakdown  - Assess and monitor skin integrity  - Assess and monitor nutrition and hydration status  - Monitor labs   - Assess for incontinence   - Turn and reposition patient  - Assist with mobility/ambulation  - Relieve pressure over bony prominences  - Avoid friction and shearing  - Provide appropriate hygiene as needed including keeping skin clean and dry  - Evaluate need for skin moisturizer/barrier cream  - Collaborate with interdisciplinary team   - Patient/family teaching  - Consider wound care consult   Outcome: Progressing     Problem: Nutrition/Hydration-ADULT  Goal: Nutrient/Hydration intake appropriate for improving, restoring or maintaining nutritional needs  Description: Monitor and assess patient's nutrition/hydration status for malnutrition  Collaborate with interdisciplinary team and initiate plan and interventions as ordered  Monitor patient's weight and dietary intake as ordered or per policy  Utilize nutrition screening tool and intervene as necessary  Determine patient's food preferences and provide high-protein, high-caloric foods as appropriate       INTERVENTIONS:  - Monitor oral intake, urinary output, labs, and treatment plans  - Assess nutrition and hydration status and recommend course of action  - Evaluate amount of meals eaten  - Assist patient with eating if necessary   - Allow adequate time for meals  - Recommend/ encourage appropriate diets, oral nutritional supplements, and vitamin/mineral supplements  - Order, calculate, and assess calorie counts as needed  - Recommend, monitor, and adjust tube feedings and TPN/PPN based on assessed needs  - Assess need for intravenous fluids  - Provide specific nutrition/hydration education as appropriate  - Include patient/family/caregiver in decisions related to nutrition  Outcome: Progressing     Problem: GASTROINTESTINAL - ADULT  Goal: Minimal or absence of nausea and/or vomiting  Description: INTERVENTIONS:  - Administer IV fluids if ordered to ensure adequate hydration  - Maintain NPO status until nausea and vomiting are resolved  - Nasogastric tube if ordered  - Administer ordered antiemetic medications as needed  - Provide nonpharmacologic comfort measures as appropriate  - Advance diet as tolerated, if ordered  - Consider nutrition services referral to assist patient with adequate nutrition and appropriate food choices  Outcome: Progressing  Goal: Maintains or returns to baseline bowel function  Description: INTERVENTIONS:  - Assess bowel function  - Encourage oral fluids to ensure adequate hydration  - Administer IV fluids if ordered to ensure adequate hydration  - Administer ordered medications as needed  - Encourage mobilization and activity  - Consider nutritional services referral to assist patient with adequate nutrition and appropriate food choices  Outcome: Progressing  Goal: Maintains adequate nutritional intake  Description: INTERVENTIONS:  - Monitor percentage of each meal consumed  - Identify factors contributing to decreased intake, treat as appropriate  - Assist with meals as needed  - Monitor I&O, weight, and lab values if indicated  - Obtain nutrition services referral as needed  Outcome: Progressing     Problem: GENITOURINARY - ADULT  Goal: Maintains or returns to baseline urinary function  Description: INTERVENTIONS:  - Assess urinary function  - Encourage oral fluids to ensure adequate hydration if ordered  - Administer IV fluids as ordered to ensure adequate hydration  - Administer ordered medications as needed  - Offer frequent toileting  - Follow urinary retention protocol if ordered  Outcome: Progressing  Goal: Absence of urinary retention  Description: INTERVENTIONS:  - Assess patients ability to void and empty bladder  - Monitor I/O  - Bladder scan as needed  - Discuss with physician/AP medications to alleviate retention as needed  - Discuss catheterization for long term situations as appropriate  Outcome: Progressing  Goal: Urinary catheter remains patent  Description: INTERVENTIONS:  - Assess patency of urinary catheter  - If patient has a chronic mehta, consider changing catheter if non-functioning  - Follow guidelines for intermittent irrigation of non-functioning urinary catheter  Outcome: Progressing     Problem: METABOLIC, FLUID AND ELECTROLYTES - ADULT  Goal: Electrolytes maintained within normal limits  Description: INTERVENTIONS:  - Monitor labs and assess patient for signs and symptoms of electrolyte imbalances  - Administer electrolyte replacement as ordered  - Monitor response to electrolyte replacements, including repeat lab results as appropriate  - Instruct patient on fluid and nutrition as appropriate  Outcome: Progressing  Goal: Fluid balance maintained  Description: INTERVENTIONS:  - Monitor labs   - Monitor I/O and WT  - Instruct patient on fluid and nutrition as appropriate  - Assess for signs & symptoms of volume excess or deficit  Outcome: Progressing     Problem: PAIN - ADULT  Goal: Verbalizes/displays adequate comfort level or baseline comfort level  Description: Interventions:  - Encourage patient to monitor pain and request assistance  - Assess pain using appropriate pain scale  - Administer analgesics based on type and severity of pain and evaluate response  - Implement non-pharmacological measures as appropriate and evaluate response  - Consider cultural and social influences on pain and pain management  - Notify physician/advanced practitioner if interventions unsuccessful or patient reports new pain  Outcome: Progressing     Problem: INFECTION - ADULT  Goal: Absence or prevention of progression during hospitalization  Description: INTERVENTIONS:  - Assess and monitor for signs and symptoms of infection  - Monitor lab/diagnostic results  - Monitor all insertion sites, i e  indwelling lines, tubes, and drains  - Monitor endotracheal if appropriate and nasal secretions for changes in amount and color  - Vansant appropriate cooling/warming therapies per order  - Administer medications as ordered  - Instruct and encourage patient and family to use good hand hygiene technique  - Identify and instruct in appropriate isolation precautions for identified infection/condition  Outcome: Progressing  Goal: Absence of fever/infection during neutropenic period  Description: INTERVENTIONS:  - Monitor WBC    Outcome: Progressing     Problem: SAFETY ADULT  Goal: Patient will remain free of falls  Description: INTERVENTIONS:  - Educate patient/family on patient safety including physical limitations  - Instruct patient to call for assistance with activity   - Consult OT/PT to assist with strengthening/mobility   - Keep Call bell within reach  - Keep bed low and locked with side rails adjusted as appropriate  - Keep care items and personal belongings within reach  - Initiate and maintain comfort rounds  - Make Fall Risk Sign visible to staff  - Offer Toileting every 2 Hours, in advance of need  - Initiate/Maintain bed alarm  - Obtain necessary fall risk management equipment  - Apply yellow socks and bracelet for high fall risk patients  - Consider moving patient to room near nurses station  Outcome: Progressing  Goal: Maintain or return to baseline ADL function  Description: INTERVENTIONS:  -  Assess patient's ability to carry out ADLs; assess patient's baseline for ADL function and identify physical deficits which impact ability to perform ADLs (bathing, care of mouth/teeth, toileting, grooming, dressing, etc )  - Assess/evaluate cause of self-care deficits   - Assess range of motion  - Assess patient's mobility; develop plan if impaired  - Assess patient's need for assistive devices and provide as appropriate  - Encourage maximum independence but intervene and supervise when necessary  - Involve family in performance of ADLs  - Assess for home care needs following discharge   - Consider OT consult to assist with ADL evaluation and planning for discharge  - Provide patient education as appropriate  Outcome: Progressing  Goal: Maintains/Returns to pre admission functional level  Description: INTERVENTIONS:  - Perform BMAT or MOVE assessment daily    - Set and communicate daily mobility goal to care team and patient/family/caregiver  - Collaborate with rehabilitation services on mobility goals if consulted  - Perform Range of Motion 4 times a day  - Reposition patient every 2 hours  - Record patient progress and toleration of activity level   Outcome: Progressing     Problem: DISCHARGE PLANNING  Goal: Discharge to home or other facility with appropriate resources  Description: INTERVENTIONS:  - Identify barriers to discharge w/patient and caregiver  - Arrange for needed discharge resources and transportation as appropriate  - Identify discharge learning needs (meds, wound care, etc )  - Arrange for interpretive services to assist at discharge as needed  - Refer to Case Management Department for coordinating discharge planning if the patient needs post-hospital services based on physician/advanced practitioner order or complex needs related to functional status, cognitive ability, or social support system  Outcome: Progressing     Problem: Knowledge Deficit  Goal: Patient/family/caregiver demonstrates understanding of disease process, treatment plan, medications, and discharge instructions  Description: Complete learning assessment and assess knowledge base    Interventions:  - Provide teaching at level of understanding  - Provide teaching via preferred learning methods  Outcome: Progressing

## 2022-09-20 NOTE — PROGRESS NOTES
Progress Note - Nephrology   Sara Avalos 81 y o  male MRN: [id number]  Unit/Bed#: -01 Encounter: [id number]      Assessment / Plan:  1  DORA, present on admission, on top of CKD stage 3b  DORA likely prerenal in setting of high GI output with possible conversion to ATN  -patient oliguric, started on HD() via temp HD line(placed 9/15)  -second treatment performed , 3rd treatment   -plan for next HD tomorrow with UF as patient on room air despite being net +8L  -monitor for signs of renal recovery  -evaluate daily for RRT needs  2  Hypokalemia - mild, avoid aggressive repletion in this patient who is HD dependent in DORA  3  Anemia in setting of Multiple myeloma - Hgb in 7s, monitor CBC, stable, transfuse prn, follows with hematology outpatient  4  Hypotension - BP improved, monitor on torsemide 40mg po BID  5  Chronic combined CHF with EF 45% - on torsemide 40mg po BID, monitor daily weight, I/O, UF as tolerated with HD tomorrow  6  Acidosis -resolved withHD, monitor BMP  7  Acute diverticulitis with large volume of stool - rectal tube in place, on questran per primary team     Other issues:  Chronic AFib, morbid obesity, hyperlipidemia, COPD, gout, diabetes mellitus type 2        Subjective:   Denies chest pain or shortness of breath  No complaints  Objective:     Vitals: Blood pressure 113/64, pulse 61, temperature 97 8 °F (36 6 °C), temperature source Axillary, resp  rate 21, height 6' 3" (1 905 m), weight 119 kg (261 lb 14 5 oz), SpO2 100 %  ,Body mass index is 32 74 kg/m²  Temp (24hrs), Av 2 °F (36 8 °C), Min:97 7 °F (36 5 °C), Max:99 5 °F (37 5 °C)      Weight (last 2 days)     Date/Time Weight    22 0600 119 (261 91)    22 0600 123 (271 39)    22 0544 120 (263 89)            Intake/Output Summary (Last 24 hours) at 2022 1457  Last data filed at 2022 0800  Gross per 24 hour   Intake 1360 ml   Output 4200 ml   Net -2840 ml     I/O last 24 hours: In: 3607 [P O :1440; I V :520; IV Piggyback:300]  Out: 4200 [Other:2500; Stool:1700]        Physical Exam:   Physical Exam  Vitals reviewed  Constitutional:       General: He is not in acute distress  Appearance: He is well-developed  He is obese  He is not diaphoretic  HENT:      Head: Normocephalic and atraumatic  Nose: Nose normal       Mouth/Throat:      Mouth: Mucous membranes are moist       Pharynx: No oropharyngeal exudate  Eyes:      General: No scleral icterus  Right eye: No discharge  Left eye: No discharge  Neck:      Thyroid: No thyromegaly  Cardiovascular:      Rate and Rhythm: Regular rhythm  Bradycardia present  Heart sounds: Normal heart sounds  Pulmonary:      Effort: Pulmonary effort is normal       Breath sounds: Normal breath sounds  No wheezing or rales  Abdominal:      General: Bowel sounds are normal  There is no distension  Palpations: Abdomen is soft  Tenderness: There is no abdominal tenderness  Comments: Rectal tube   Musculoskeletal:         General: No swelling  Cervical back: Neck supple  Comments: S/p L BKA   Lymphadenopathy:      Cervical: No cervical adenopathy  Skin:     General: Skin is warm and dry  Findings: No rash  Neurological:      General: No focal deficit present  Mental Status: He is alert  Comments: awake   Psychiatric:         Mood and Affect: Mood normal          Behavior: Behavior normal          Invasive Devices  Report    Peripheral Intravenous Line  Duration           Peripheral IV 09/16/22 Distal;Right;Upper;Ventral (anterior) Arm 3 days    Peripheral IV 09/20/22 Dorsal (posterior); Right Forearm <1 day          Hemodialysis Catheter  Duration           HD Temporary Double Catheter 4 days          Drain  Duration           Rectal Tube With balloon 5 days                Medications:    Scheduled Meds:  Current Facility-Administered Medications   Medication Dose Route Frequency Provider Last Rate    acetaminophen  650 mg Oral Q6H PRN Cherene Robin, PA-C      albuterol  2 puff Inhalation Q6H PRN Cherene Robin, PA-C      allopurinol  300 mg Oral Daily Cherene Robin, PA-C      atorvastatin  40 mg Oral After [place] Christiano, PA-C      cholestyramine sugar free  4 g Oral BID Cherene Robin, PA-C      clotrimazole   Topical BID Cherene Sparta, PA-C      dicyclomine  10 mg Oral 4x Daily [facility] Logan May MD      fluticasone-vilanterol  1 puff Inhalation Daily Cherene Sparta, PA-C      insulin lispro  1-6 Units Subcutaneous TID With Meals Mary Pineda DO      loperamide  2 mg Oral 4x Daily PRN Cherene Sparta, PA-C      melatonin  6 mg Oral HS Cherene Robin, PA-C      pantoprazole  40 mg Oral Early Morning Cherene Sparta, PA-C      piperacillin-tazobactam  3 375 g Intravenous Q12H Saranya Kelly, PA-C 3 375 g (09/20/22 1453)    torsemide  40 mg Oral BID NICOLA Coon      warfarin  2 mg Oral Daily (warfarin) NICOLA Coon         PRN Meds:   acetaminophen    albuterol    loperamide    Continuous Infusions:         LAB RESULTS:      Results from last 7 days   Lab Units 09/20/22  0422 09/19/22  0442 09/18/22  0511 09/17/22  1135 09/17/22  0454 09/16/22  2356 09/16/22  2008 09/16/22  1647 09/16/22  1216 09/16/22  0907 09/16/22  0501 09/15/22  1241 09/15/22  0805 09/15/22  0529 09/14/22  1611 09/14/22  1202 09/14/22  0523 09/13/22  2322 09/13/22  2322 09/13/22  1708   WBC Thousand/uL  --  4 44 4 12*  --  5 47  --   --   --   --   --  5 82  --   --  5 77  --  7 10 6 41  --   --   --    HEMOGLOBIN g/dL  --  7 5* 7 0*  --  7 5*  --   --   --   --   --  7 6*  --  7 1* 6 8*  --  7 8* 7 0*   < >  --   --    HEMATOCRIT %  --  24 0* 22 1*  --  23 5*  --   --   --   --   --  23 6*  --   --  20 9*  --  23 7* 21 0*  --   --   --    PLATELETS Thousands/uL  --  254 203  --  189  --   --   --   --   --  166  --   --  141*  --  135* 123*  --   --   --    NEUTROS PCT %  --   --  54  --   --   -- --   --   --   --   --   --   --   --   --  72 71  --   --   --    LYMPHS PCT %  --   --  22  --   --   --   --   --   --   --   --   --   --   --   --  15 15  --   --   --    LYMPHO PCT %  --  0*  --   --   --   --   --   --   --   --   --   --   --   --   --   --   --   --   --   --    MONOS PCT %  --   --  14*  --   --   --   --   --   --   --   --   --   --   --   --  9 9  --   --   --    MONO PCT %  --  0*  --   --   --   --   --   --   --   --   --   --   --   --   --   --   --   --   --   --    EOS PCT %  --  0 8*  --   --   --   --   --   --   --   --   --   --   --   --  3 4  --   --   --    POTASSIUM mmol/L 3 6 3 5 3 3* 3 4* 3 7 3 9 4 1 3 7 3 3*   < > 3 6   < > 3 4* 3 6   < > 3 5 3 2*  --  3 7 4 0   CHLORIDE mmol/L 103 104 104 102 103 103 103 102 102   < > 102   < > 101 101   < > 102 105  --  106 107   CO2 mmol/L 26 23 25 28 20* 21 23 21 24   < > 20*   < > 24 24   < > 20* 18*  --  17* 17*   BUN mg/dL 19 39* 32* 34* 66* 68* 68* 66* 63*   < > 98*   < > 97* 100*   < > 101* 103*  --  103* 98*   CREATININE mg/dL 4 52* 6 28* 4 92* 4 22* 7 71* 7 32* 7 35* 6 89* 6 45*   < > 9 11*   < > 8 44* 8 45*   < > 8 26* 8 06*  --  8 17* 8 14*   CALCIUM mg/dL 8 3 8 8 7 6* 8 0* 8 2* 8 6 8 4 8 3 8 1*   < > 8 4   < > 7 8* 8 3   < > 8 1* 7 7*  --  7 6* 7 8*   ALK PHOS U/L  --   --  87  --   --   --   --   --   --   --   --   --   --   --   --   --  92  --   --   --    ALT U/L  --   --  8*  --   --   --   --   --   --   --   --   --   --   --   --   --  14  --   --   --    AST U/L  --   --  14  --   --   --   --   --   --   --   --   --   --   --   --   --  14  --   --   --    MAGNESIUM mg/dL  --   --  2 0 1 7 2 1 2 6 2 3 2 1 2 1   < > 2 7*   < > 2 1 2 5   < > 2 2 1 9  --  1 5* 1 6   PHOSPHORUS mg/dL  --   --   --   --  6 0*  --   --   --   --   --   --   --   --   --   --   --  6 1*  --  5 6* 5 4*    < > = values in this interval not displayed         CUTURES:  Lab Results   Component Value Date    BLOODCX No Growth After 5 Days  09/12/2022    BLOODCX No Growth After 5 Days  09/12/2022    BLOODCX No Growth After 5 Days  10/04/2021    BLOODCX No Growth After 5 Days  10/04/2021    BLOODCX No Growth After 5 Days  06/25/2020    BLOODCX No Growth After 5 Days  06/25/2020    BLOODCX Staphylococcus coagulase negative (A) 12/04/2019    BLOODCX No Growth After 5 Days  12/04/2019    URINECX >100,000 cfu/ml Escherichia coli (A) 12/05/2019                 Portions of the record may have been created with voice recognition software  Occasional wrong word or "sound a like" substitutions may have occurred due to the inherent limitations of voice recognition software  Read the chart carefully and recognize, using context, where substitutions have occurred  If you have any questions, please contact the dictating provider

## 2022-09-21 ENCOUNTER — APPOINTMENT (INPATIENT)
Dept: DIALYSIS | Facility: HOSPITAL | Age: 72
DRG: 674 | End: 2022-09-21
Attending: INTERNAL MEDICINE
Payer: COMMERCIAL

## 2022-09-21 LAB
ALBUMIN SERPL BCP-MCNC: 2.4 G/DL (ref 3.5–5)
ANION GAP SERPL CALCULATED.3IONS-SCNC: 12 MMOL/L (ref 4–13)
B2 MICROGLOB SERPL-MCNC: 11.9 MG/L (ref 0.6–2.4)
BUN SERPL-MCNC: 24 MG/DL (ref 5–25)
CALCIUM ALBUM COR SERPL-MCNC: 10 MG/DL (ref 8.3–10.1)
CALCIUM SERPL-MCNC: 8.7 MG/DL (ref 8.3–10.1)
CHLORIDE SERPL-SCNC: 102 MMOL/L (ref 96–108)
CO2 SERPL-SCNC: 23 MMOL/L (ref 21–32)
CREAT SERPL-MCNC: 5.84 MG/DL (ref 0.6–1.3)
ERYTHROCYTE [DISTWIDTH] IN BLOOD BY AUTOMATED COUNT: 18.9 % (ref 11.6–15.1)
GFR SERPL CREATININE-BSD FRML MDRD: 8 ML/MIN/1.73SQ M
GLUCOSE SERPL-MCNC: 148 MG/DL (ref 65–140)
GLUCOSE SERPL-MCNC: 151 MG/DL (ref 65–140)
GLUCOSE SERPL-MCNC: 159 MG/DL (ref 65–140)
GLUCOSE SERPL-MCNC: 86 MG/DL (ref 65–140)
GLUCOSE SERPL-MCNC: 97 MG/DL (ref 65–140)
HCT VFR BLD AUTO: 26.8 % (ref 36.5–49.3)
HGB BLD-MCNC: 8.1 G/DL (ref 12–17)
INR PPP: 2.28 (ref 0.84–1.19)
MAGNESIUM SERPL-MCNC: 1.7 MG/DL (ref 1.6–2.6)
MCH RBC QN AUTO: 26.1 PG (ref 26.8–34.3)
MCHC RBC AUTO-ENTMCNC: 30.2 G/DL (ref 31.4–37.4)
MCV RBC AUTO: 87 FL (ref 82–98)
PHOSPHATE SERPL-MCNC: 5.4 MG/DL (ref 2.3–4.1)
PLATELET # BLD AUTO: 225 THOUSANDS/UL (ref 149–390)
PMV BLD AUTO: 10.2 FL (ref 8.9–12.7)
POTASSIUM SERPL-SCNC: 3.6 MMOL/L (ref 3.5–5.3)
PROTHROMBIN TIME: 26.3 SECONDS (ref 11.6–14.5)
RBC # BLD AUTO: 3.1 MILLION/UL (ref 3.88–5.62)
SODIUM SERPL-SCNC: 137 MMOL/L (ref 135–147)
WBC # BLD AUTO: 4.93 THOUSAND/UL (ref 4.31–10.16)

## 2022-09-21 PROCEDURE — NC001 PR NO CHARGE: Performed by: RADIOLOGY

## 2022-09-21 PROCEDURE — 80069 RENAL FUNCTION PANEL: CPT | Performed by: INTERNAL MEDICINE

## 2022-09-21 PROCEDURE — 99232 SBSQ HOSP IP/OBS MODERATE 35: CPT | Performed by: REGISTERED NURSE

## 2022-09-21 PROCEDURE — 0JH63XZ INSERTION OF TUNNELED VASCULAR ACCESS DEVICE INTO CHEST SUBCUTANEOUS TISSUE AND FASCIA, PERCUTANEOUS APPROACH: ICD-10-PCS | Performed by: RADIOLOGY

## 2022-09-21 PROCEDURE — 85027 COMPLETE CBC AUTOMATED: CPT | Performed by: INTERNAL MEDICINE

## 2022-09-21 PROCEDURE — 02H633Z INSERTION OF INFUSION DEVICE INTO RIGHT ATRIUM, PERCUTANEOUS APPROACH: ICD-10-PCS | Performed by: RADIOLOGY

## 2022-09-21 PROCEDURE — 99232 SBSQ HOSP IP/OBS MODERATE 35: CPT | Performed by: INTERNAL MEDICINE

## 2022-09-21 PROCEDURE — 82948 REAGENT STRIP/BLOOD GLUCOSE: CPT

## 2022-09-21 PROCEDURE — 85610 PROTHROMBIN TIME: CPT | Performed by: INTERNAL MEDICINE

## 2022-09-21 PROCEDURE — 99232 SBSQ HOSP IP/OBS MODERATE 35: CPT | Performed by: STUDENT IN AN ORGANIZED HEALTH CARE EDUCATION/TRAINING PROGRAM

## 2022-09-21 PROCEDURE — 83735 ASSAY OF MAGNESIUM: CPT | Performed by: INTERNAL MEDICINE

## 2022-09-21 RX ORDER — CALCIUM ACETATE 667 MG/1
667 CAPSULE ORAL
Status: DISCONTINUED | OUTPATIENT
Start: 2022-09-21 | End: 2022-09-30

## 2022-09-21 RX ORDER — WARFARIN SODIUM 2 MG/1
2 TABLET ORAL
Status: DISCONTINUED | OUTPATIENT
Start: 2022-09-22 | End: 2022-09-24

## 2022-09-21 RX ADMIN — FLUTICASONE FUROATE AND VILANTEROL TRIFENATATE 1 PUFF: 200; 25 POWDER RESPIRATORY (INHALATION) at 09:05

## 2022-09-21 RX ADMIN — CHOLESTYRAMINE 4 G: 4 POWDER, FOR SUSPENSION ORAL at 09:09

## 2022-09-21 RX ADMIN — Medication 6 MG: at 21:21

## 2022-09-21 RX ADMIN — DICYCLOMINE HYDROCHLORIDE 10 MG: 10 CAPSULE ORAL at 21:21

## 2022-09-21 RX ADMIN — CLOTRIMAZOLE 1 APPLICATION: 0.01 CREAM TOPICAL at 09:09

## 2022-09-21 RX ADMIN — INSULIN LISPRO 1 UNITS: 100 INJECTION, SOLUTION INTRAVENOUS; SUBCUTANEOUS at 11:21

## 2022-09-21 RX ADMIN — CALCIUM ACETATE 667 MG: 667 CAPSULE ORAL at 11:20

## 2022-09-21 RX ADMIN — PIPERACILLIN AND TAZOBACTAM 3.38 G: 3; .375 INJECTION, POWDER, LYOPHILIZED, FOR SOLUTION INTRAVENOUS at 17:10

## 2022-09-21 RX ADMIN — ATORVASTATIN CALCIUM 40 MG: 40 TABLET, FILM COATED ORAL at 17:18

## 2022-09-21 RX ADMIN — DICYCLOMINE HYDROCHLORIDE 10 MG: 10 CAPSULE ORAL at 05:32

## 2022-09-21 RX ADMIN — TORSEMIDE 40 MG: 20 TABLET ORAL at 09:05

## 2022-09-21 RX ADMIN — ALLOPURINOL 300 MG: 300 TABLET ORAL at 09:05

## 2022-09-21 RX ADMIN — CLOTRIMAZOLE: 0.01 CREAM TOPICAL at 17:12

## 2022-09-21 RX ADMIN — DICYCLOMINE HYDROCHLORIDE 10 MG: 10 CAPSULE ORAL at 11:20

## 2022-09-21 RX ADMIN — PIPERACILLIN AND TAZOBACTAM 3.38 G: 3; .375 INJECTION, POWDER, LYOPHILIZED, FOR SOLUTION INTRAVENOUS at 03:13

## 2022-09-21 RX ADMIN — PANTOPRAZOLE SODIUM 40 MG: 40 TABLET, DELAYED RELEASE ORAL at 05:32

## 2022-09-21 RX ADMIN — CHOLESTYRAMINE 4 G: 4 POWDER, FOR SUSPENSION ORAL at 17:18

## 2022-09-21 NOTE — ASSESSMENT & PLAN NOTE
· Bradycardia with Junctional escape beats 20's overnight with hypotension  · No rate control meds d/c in august due to bradycardia  · TSH 0 4  · Coumadin   · INR today 2 1  · Cards consulted recommending CPAP HS and occurs with apnea episodes PT REFUSES     Had short run of accelerated idioventricular rhythm, discussed with cardio, no further intervention recommend electrolyte replacement   Pt set for hd today

## 2022-09-21 NOTE — ASSESSMENT & PLAN NOTE
Lab Results   Component Value Date    HGBA1C 6 3 (H) 08/05/2022       Recent Labs     09/20/22  1608 09/20/22  2104 09/21/22  0708 09/21/22  1051   POCGLU 220* 118 86 159*       Blood Sugar Average: Last 72 hrs:  (P) 129 [id number]   · Hold lantus with DORA/hypoglycemia  · SSI

## 2022-09-21 NOTE — TELEMEDICINE
e-Consult (IPC)  - Interventional Radiology  Marina Aleman 81 y o  male MRN: [id number]  Unit/Bed#: -01 Encounter: [id number]          Interventional Radiology has been consulted to evaluate Marina Aleman    We were consulted by Jonathan Zamora concerning this patient with renal failure  IP Consult to IR  Consult performed by: Patrica Mendoza DO  Consult ordered by: Luis Larson        09/21/22    Assessment/Recommendation:   81 y M s/p temp HD catheter placement w/ no evidence of renal recovery  Request for conversion / placement of tunneled HD catheter  [email or web address] for tentative placement Thur 9/22  Check AM INR, recommend Coumadin hold tonight  INR needs to be <2 5, currently 2 28     5-10 minutes, >50% of the total time devoted to medical consultative verbal/EMR discussion between providers  Written report will be generated in the EMR  Thank you for allowing Interventional Radiology to participate in the care of Marina Aleman  Please don't hesitate to call or TigerText us with any questions       Patrica Mendoza DO

## 2022-09-21 NOTE — PROGRESS NOTES
[place]  Progress Note - Frances Salomon 1950, 81 y o  male MRN: [id number]  Unit/Bed#: -Dariusz Encounter: [id number]  Primary Care Provider: Jose Alberto Jorge MD   Date and time admitted to hospital: 9/12/2022  4:09 PM    * DORA (acute kidney injury) [facility])  Assessment & Plan  · DORA on CKD 3  · Most recent d/c creat 3-3 7 prior in 2 range  · CT no hydro, nonobstructing calculi  · UA neg  · Nephrology following  · 9/15 R tunneled HD cath placed  · 9/15 and 9/16 IHD  · Monitor for renal recovery remains oliguric  · S/p IHD on 9/19   · Resume demadex  · Currently on m/w/f schedule with no signs of renal recovery  · IR consulted for perm cath    Acute diverticulitis  Assessment & Plan  · 2nd episode in one month (tx with 10 days antibx 8/2-8/11)  · CTAP acute diverticulitis  · GI following  · 7/25 Colonoscopy--2 polyps removed, Mild diverticula in the descending colon and sigmoid colon, Small, internal hemorrhoids  · Zosyn D 9/10  · Donny Madi started 9/17   · Imodium PRN  · Bentyl added 9/18 by GI  · Monitor rectal tube output, continues to have diarrhea but overall improving  · advance diet per gi recommendations  · Gi suspects symptoms 2/2 IV abx, if no improvement may need flex sig with biopsy to assess for microscopic colitis    Hypokalemia due to excessive gastrointestinal loss of potassium  Assessment & Plan  · Replete k/mg and recheck    Abnormal CT scan  Assessment & Plan  · CTAP-common bile duct stent with pneumobilia and air within gallblader  Distended gallbladder with cholelithiasis  · RUQ u/s distended gallbladder with air within stent in CBD similar to CT  · Surgery consulted   If concern for acute cholecystitis not surgical candidate would need per vanessa tube  · GI following  · ERCP 8/29/2022, Kaylie Herrera esophagus seen during EGD, biopsy obtained   ERCP performed with endoscopic mucosal resection of abnormal looking ampullary tissue, PD, CBD stent placed  · No s/s acute cholecystitis monitor     Anemia  Assessment & Plan  · Baseline hgb 8  · Receives IV venofer  · Transfuse for hgb <7    Ambulatory dysfunction  Assessment & Plan  · 2/2 R AKA  · PT/OT    Multiple myeloma ([address] )  Assessment & Plan  · Follows with Dr Kwabena Sultana  · On Velcade and decadron last tx 9/6  · Free light chains pending outpt f/u    Diabetes mellitus, type 2 [facility])  Assessment & Plan  Lab Results   Component Value Date    HGBA1C 6 3 (H) 08/05/2022       Recent Labs     09/20/22  1608 09/20/22  2104 09/21/22  0708 09/21/22  1051   POCGLU 220* 118 86 159*       Blood Sugar Average: Last 72 hrs:  (P) 129 [id number]   · Hold lantus with DORA/hypoglycemia  · SSI    NALLELY (obstructive sleep apnea)  Assessment & Plan  · Refuses cpap    Chronic combined systolic and diastolic congestive heart failure (HCC)  Assessment & Plan  Wt Readings from Last 3 Encounters:   09/21/22 113 kg (248 lb 14 4 oz)   09/06/22 117 kg (257 lb 15 oz)   08/30/22 118 kg (260 lb)     · EF 45% decreased RV fx  · Appears euvolemic to dry prior weights noted 126kg  · Resume demadex  · I/O  · Daily weight  · Currently being managed with HD  ·         Morbid obesity (HCC)  Assessment & Plan  · Dietary education    Chronic atrial fibrillation (HCC)  Assessment & Plan  · Bradycardia with Junctional escape beats 20's overnight with hypotension  · No rate control meds d/c in august due to bradycardia  · TSH 0 4  · Coumadin   · INR today 2 1  · Cards consulted recommending CPAP HS and occurs with apnea episodes PT REFUSES     Had short run of accelerated idioventricular rhythm, discussed with cardio, no further intervention recommend electrolyte replacement  Pt set for hd today          VTE Pharmacologic Prophylaxis: VTE Score: 6 Moderate Risk (Score 3-4) - Pharmacological DVT Prophylaxis Contraindicated  Sequential Compression Devices Ordered      Patient Centered Rounds: I performed bedside rounds with nursing staff today   Discussions with Specialists or Other Care Team Provider: cardio    Education and Discussions with Family / Patient: Updated  (son) via phone  Time Spent for Care: 30 minutes  More than 50% of total time spent on counseling and coordination of care as described above  Current Length of Stay: 9 day(s)  Current Patient Status: Inpatient   Certification Statement: The patient will continue to require additional inpatient hospital stay due to rafaela, iv abx diarrhea  Discharge Plan: Anticipate discharge in 48-72 hrs to prior assisted or independent living facility  Code Status: Level 1 - Full Code    Subjective:   Patient seen examined at bedside  Currently has no acute complaints states that he is feeling well  Objective:     Vitals:   Temp (24hrs), Av 2 °F (36 8 °C), Min:97 9 °F (36 6 °C), Max:98 4 °F (36 9 °C)    Temp:  [97 9 °F (36 6 °C)-98 4 °F (36 9 °C)] 98 2 °F (36 8 °C)  HR:  [41-63] 41  Resp:  [19-23] 23  BP: (106-119)/(54-60) 119/60  SpO2:  [95 %-100 %] 99 %  Body mass index is 31 11 kg/m²  Input and Output Summary (last 24 hours): Intake/Output Summary (Last 24 hours) at 2022 1415  Last data filed at 2022 1250  Gross per 24 hour   Intake 1088 33 ml   Output 700 ml   Net 388 33 ml       Physical Exam:   Physical Exam  Vitals reviewed  Constitutional:       Appearance: He is obese  HENT:      Head: Normocephalic and atraumatic  Right Ear: External ear normal       Left Ear: External ear normal       Nose: Nose normal       Mouth/Throat:      Mouth: Mucous membranes are moist       Pharynx: Oropharynx is clear  Eyes:      Extraocular Movements: Extraocular movements intact  Cardiovascular:      Rate and Rhythm: Normal rate and regular rhythm  Heart sounds: Normal heart sounds  Pulmonary:      Effort: Pulmonary effort is normal       Breath sounds: Normal breath sounds  Abdominal:      General: Abdomen is flat        Palpations: Abdomen is soft       Tenderness: There is no abdominal tenderness  Musculoskeletal:      Cervical back: Normal range of motion  Right lower leg: No edema  Left lower leg: No edema  Comments: Right aka   Skin:     General: Skin is warm and dry  Neurological:      Mental Status: He is alert  Mental status is at baseline  Psychiatric:         Mood and Affect: Mood normal          Behavior: Behavior normal           Additional Data:     Labs:  Results from last 7 days   Lab Units 09/21/22  0518 09/19/22  0442 09/18/22  0511   WBC Thousand/uL 4 93 4 44 4 12*   HEMOGLOBIN g/dL 8 1* 7 5* 7 0*   HEMATOCRIT % 26 8* 24 0* 22 1*   PLATELETS Thousands/uL 225 254 203   NEUTROS PCT %  --   --  54   LYMPHS PCT %  --   --  22   LYMPHO PCT %  --  0*  --    MONOS PCT %  --   --  14*   MONO PCT %  --  0*  --    EOS PCT %  --  0 8*     Results from last 7 days   Lab Units 09/21/22  0518 09/19/22  0442 09/18/22  0511   SODIUM mmol/L 137   < > 140   POTASSIUM mmol/L 3 6   < > 3 3*   CHLORIDE mmol/L 102   < > 104   CO2 mmol/L 23   < > 25   BUN mg/dL 24   < > 32*   CREATININE mg/dL 5 84*   < > 4 92*   ANION GAP mmol/L 12   < > 11   CALCIUM mg/dL 8 7   < > 7 6*   ALBUMIN g/dL 2 4*  --  2 2*   TOTAL BILIRUBIN mg/dL  --   --  0 50   ALK PHOS U/L  --   --  87   ALT U/L  --   --  8*   AST U/L  --   --  14   GLUCOSE RANDOM mg/dL 97   < > 107    < > = values in this interval not displayed       Results from last 7 days   Lab Units 09/21/22  0518   INR  2 28*     Results from last 7 days   Lab Units 09/21/22  1051 09/21/22  0708 09/20/22  2104 09/20/22  1608 09/20/22  1126 09/20/22  0648 09/20/22  0611 09/19/22  2057 09/19/22  1546 09/19/22  1132 09/19/22  0558 09/19/22  0115   POC GLUCOSE mg/dl 159* 86 118 220* 135 103 108 121 103 194* 88 100               Lines/Drains:  Invasive Devices  Report    Peripheral Intravenous Line  Duration           Peripheral IV 09/16/22 Distal;Right;Upper;Ventral (anterior) Arm 4 days    Peripheral IV 22 Dorsal (posterior); Right Forearm 1 day          Hemodialysis Catheter  Duration           HD Temporary Double Catheter 5 days          Drain  Duration           Rectal Tube With balloon 5 days                  Telemetry:  Telemetry Orders (From admission, onward)             48 Hour Telemetry Monitoring  Continuous x 48 hours           References:    Telemetry Guidelines   Question:  Reason for 48 Hour Telemetry  Answer:  Arrhythmias Requiring Medical Therapy (eg  SVT, Vtach/fib, Bradycardia, Uncontrolled A-fib)                 Telemetry Reviewed: short Run of accelerated idioventricular rhythm  Indication for Continued Telemetry Use: Arrthymias requiring medical therapy             Imaging: Reviewed radiology reports from this admission including: abdominal/pelvic CT    Recent Cultures (last 7 days):         Last 24 Hours Medication List:   Current Facility-Administered Medications   Medication Dose Route Frequency Provider Last Rate    acetaminophen  650 mg Oral Q6H PRN [place], PA-SHANEKA      albuterol  2 puff Inhalation Q6H PRN Cami Lovelady, PA-C      allopurinol  300 mg Oral Daily [place] Lovelady, PA-SHANEKA      atorvastatin  40 mg Oral After JOEY Conn      calcium acetate  667 mg Oral TID With Meals NICOLA Oliva      cholestyramine sugar free  4 g Oral BID [place], PA-SHANEKA      clotrimazole   Topical BID [place] Lovelady, PA-SHANEKA      dicyclomine  10 mg Oral 4x Daily (AC & HS) [place] Lovelady, PA-SHANEKA      fluticasone-vilanterol  1 puff Inhalation Daily [place] Lovel[place], PA-C      insulin lispro  1-6 Units Subcutaneous TID With Meals Cami SalinaJOEY [place]      loperamide  2 mg Oral 4x Daily PRN Cami [place], PA-SHANEKA      melatonin  6 mg Oral HS Lolis Foote PA-C      pantoprazole  40 mg Oral Early Morning Cami Lovelady, PA-SHANEKA      piperacillin-tazobactam  3 375 g Intravenous Q12H Mary Pineda DO 3 375 g (22)    torsemide  40 mg Oral BID Cami LoveladyJOEY [place]      [START ON 2022] warfarin  2 mg Oral Daily (warfarin) Mary Pineda DO          Today, Patient Was Seen By: Eudora Heimlich, DO    **Please Note: This note may have been constructed using a voice recognition system  **

## 2022-09-21 NOTE — ASSESSMENT & PLAN NOTE
· DORA on CKD 3  · Most recent d/c creat 3-3 7 prior in 2 range  · CT no hydro, nonobstructing calculi  · UA neg  · Nephrology following  · 9/15 R tunneled HD cath placed  · 9/15 and 9/16 IHD  · Monitor for renal recovery remains oliguric  · S/p IHD on 9/19   · Resume demadex  · Currently on m/w/f schedule with no signs of renal recovery  · IR consulted for perm cath

## 2022-09-21 NOTE — ASSESSMENT & PLAN NOTE
· Follows with Dr Rajiv Chavarria  · On Velcade and decadron last tx 9/6  · Free light chains pending outpt f/u

## 2022-09-21 NOTE — ASSESSMENT & PLAN NOTE
· CTAP-common bile duct stent with pneumobilia and air within gallblader  Distended gallbladder with cholelithiasis  · RUQ u/s distended gallbladder with air within stent in CBD similar to CT  · Surgery consulted  If concern for acute cholecystitis not surgical candidate would need per vanessa tube  · GI following  · ERCP 8/29/2022, Abi Patel esophagus seen during EGD, biopsy obtained   ERCP performed with endoscopic mucosal resection of abnormal looking ampullary tissue, PD, CBD stent placed    · No s/s acute cholecystitis monitor

## 2022-09-21 NOTE — ASSESSMENT & PLAN NOTE
Wt Readings from Last 3 Encounters:   09/21/22 113 kg (248 lb 14 4 oz)   09/06/22 117 kg (257 lb 15 oz)   08/30/22 118 kg (260 lb)     · EF 45% decreased RV fx  · Appears euvolemic to dry prior weights noted 126kg  · Resume demadex  · I/O  · Daily weight  · Currently being managed with HD  ·

## 2022-09-21 NOTE — PROGRESS NOTES
NEPHROLOGY PROGRESS NOTE   Catie Kerr 81 y o  male MRN: [id number]  Unit/Bed#: -01 Encounter: [id number]  Reason for Consult: DORA (POA)    53-year-old male with history CKD stage IIIB, combined CHF, atrial fibrillation, morbid obesity, multiple myeloma, who presents to the emergency department from nursing home with nausea, vomiting, diarrhea, and abnormal laboratory values on outpatient blood work  There was concern for acute cholecystitis  Nephrology consulted for acute kidney injury  ASSESSMENT/PLAN:  DORA (POA) on CKD IIIB:  Suspected prerenal due to volume depletion with high GI output with progression to ATN  -baseline creatinine previously in the 2s   -initiated on HD 9/16 due to oliguria, 2nd treatment 9/17 with 3rd treatment on 09/19   -currently following Monday Wednesday Friday schedule for hemodialysis needs  -monitoring daily for renal recovery  Currently no evidence of renal recovery   -case management assisting with outpatient HD unit placement  -CT scan negative for hydro  -UA negative   -strict I/O  Access:  Temporary HD line:  Placed 09/15/2022   -will need conversion to perm cath  Consult IR in am      Hypotension:  Blood pressure has improved, currently acceptable  -current medication: Torsemide 40 mg po BID  -recommend avoiding hypotension or high fluctuations in blood pressure   -recommend maintaining map greater than 65 mmHg  Volume status/combined CHF:  With EF of 45%  -currently on torsemide 40 mg p o  b i d  -monitoring daily weights, fluid restriction, strict I/O   -will attempt UF with HD  Electrolytes:  -previous acidosis, currently resolved   -previous hypokalemia, suspect due to diarrhea and acidosis  Will continue to monitor and trend on HD  Anemia:  With history of multiple myeloma  Following with Hematology as outpatient    -on Velcade and Decadron for treatment of multiple myeloma, last treatment on 09/06/2022   -continue to monitor and transfuse as needed for hemoglobin less than 7 0  MBD in CKD:  -magnesium level normal, phosphorus elevated to 5 4 but improved from previous level   -recommend renal diet  -will start on calcium acetate  Acute diverticulitis:  Currently with rectal tube and on Bentyl, Imodium, and Questran  GI team following   -C diff negative  Other:  Atrial fibrillation, morbid obesity, COPD, gout, diabetes, hyperlipidemia  Disposition:  Requiring additional stay due to medical needs  SUBJECTIVE:  The patient is resting in his bed  He denies chest discomfort or shortness of breath  He remains on room air  He denies nausea or vomiting  He has been incontinent of urine  He currently has rectal tube  He denies any issues with his appetite      OBJECTIVE:  Current Weight: Weight - Scale: 113 kg (248 lb 14 4 oz)  Vitals:    09/21/22 0009 09/21/22 0500 09/21/22 0710 09/21/22 0726   BP: 106/56  118/55 111/55   BP Location:   Left arm    Pulse: 63  (!) 48 (!) 45   Resp:   20 19   Temp: 98 2 °F (36 8 °C)  97 9 °F (36 6 °C)    TempSrc: Oral  Oral    SpO2: 95%  96% 99%   Weight: 113 kg (248 lb 14 4 oz) 113 kg (248 lb 14 4 oz)     Height:           Intake/Output Summary (Last 24 hours) at 9/21/2022 5064  Last data filed at 9/21/2022 8865  Gross per 24 hour   Intake 1088 33 ml   Output 700 ml   Net 388 33 ml     General: NAD  Skin: warm, dry, intact, no rash  HEENT: Moist mucous membranes, sclera anicteric, normocephalic, atraumatic  Neck: No apparent JVD appreciated  Chest: lung sounds clear B/L, on RA, temporary HD catheter   CVS:Regular rate and rhythm, no murmer   Abdomen: Soft, round, non-tender, +BS, rectal tube, obese  Extremities:  Left lower extremity amputation, right lower extremity edema  Neuro: alert and oriented  Psych: appropriate mood and affect     Medications:    Current Facility-Administered Medications:     acetaminophen (TYLENOL) tablet 650 mg, 650 mg, Oral, Q6H PRN, Inocencia Allen PA-C    albuterol (PROVENTIL HFA,VENTOLIN HFA) inhaler 2 puff, 2 puff, Inhalation, Q6H PRN, Jm Joshi PA-C    allopurinol (ZYLOPRIM) tablet 300 mg, 300 mg, Oral, Daily, Lolis Foote PA-C, 300 mg at 09/21/22 0905    atorvastatin (LIPITOR) tablet 40 mg, 40 mg, Oral, After Dinner, Jm Joshi PA-C, 40 mg at 09/20/22 1741    cholestyramine sugar free (QUESTRAN LIGHT) packet 4 g, 4 g, Oral, BID, Lolis Foote PA-C, 4 g at 09/21/22 0909    clotrimazole (LOTRIMIN) 1 % cream, , Topical, BID, Jm Joshi PA-C, 1 application at 28/58/51 0909    dicyclomine (BENTYL) capsule 10 mg, 10 mg, Oral, 4x Daily (AC & HS), Lolis Foote PA-C, 10 mg at 09/21/22 0532    fluticasone-vilanterol (BREO ELLIPTA) 200-25 MCG/INH inhaler 1 puff, 1 puff, Inhalation, Daily, Lolis Foote PA-C, 1 puff at 09/21/22 0905    insulin lispro (HumaLOG) 100 units/mL subcutaneous injection 1-6 Units, 1-6 Units, Subcutaneous, TID With Meals, 2 Units at 09/20/22 1741 **AND** Fingerstick Glucose (POCT), , , 4x Daily AC and at bedtime, Jm Joshi PA-C    loperamide (IMODIUM) capsule 2 mg, 2 mg, Oral, 4x Daily PRN, Jm Joshi PA-C, 2 mg at 09/20/22 1453    melatonin tablet 6 mg, 6 mg, Oral, HS, Lolis Foote PA-C, 6 mg at 09/20/22 2104    pantoprazole (PROTONIX) EC tablet 40 mg, 40 mg, Oral, Early Morning, Lolis Foote PA-C, 40 mg at 09/21/22 0532    piperacillin-tazobactam (ZOSYN) 3 375 g in sodium chloride 0 9 % 100 mL IVPB (EXTENDED-INFUSION), 3 375 g, Intravenous, Q12H, Lolis Foote PA-C, Last Rate: 25 mL/hr at 09/21/22 0313, 3 375 g at 09/21/22 0313    torsemide (DEMADEX) tablet 40 mg, 40 mg, Oral, BID, Lolis Foote PA-C, 40 mg at 09/21/22 0586    warfarin (COUMADIN) tablet 2 mg, 2 mg, Oral, Daily (warfarin), Jm Joshi PA-C, 2 mg at 09/20/22 1741    Laboratory Results:  Results from last 7 days   Lab Units 09/21/22  0518 09/20/22  0422 09/19/22  0442 09/18/22  0511 09/17/22  1135 09/17/22  0454   WBC Thousand/uL 4 93  --  4 44 4 12*  --  5 47   HEMOGLOBIN g/dL 8 1*  -- 7  5* 7 0*  --  7 5*   HEMATOCRIT % 26 8*  --  24 0* 22 1*  --  23 5*   PLATELETS Thousands/uL 225  --  254 203  --  189   SODIUM mmol/L 137 140 141 140 140 139   POTASSIUM mmol/L 3 6 3 6 3 5 3 3* 3 4* 3 7   CHLORIDE mmol/L 102 103 104 104 102 103   CO2 mmol/L 23 26 23 25 28 20*   BUN mg/dL 24 19 39* 32* 34* 66*   CREATININE mg/dL 5 84* 4 52* 6 28* 4 92* 4 22* 7 71*   CALCIUM mg/dL 8 7 8 3 8 8 7 6* 8 0* 8 2*   MAGNESIUM mg/dL 1 7  --   --  2 0 1 7 2 1   PHOSPHORUS mg/dL 5 4*  --   --   --   --  6 0*   ALK PHOS U/L  --   --   --  87  --   --    ALT U/L  --   --   --  8*  --   --    AST U/L  --   --   --  14  --   --

## 2022-09-21 NOTE — UTILIZATION REVIEW
Continued Stay Review    Date: 09/21/2022                   Current Patient Class: IP Current Level of Care: MS    HPI:72 y o  male initially admitted on 09/12/2022    Assessment/Plan: Pt w/ no acute complaints  She had a short run of accelerated idioventricular rhthm  Per cardio no furthe intervention , recommend electrolyte replacement  For HD tx today  Creatinine up to 5 84 today  On MWF HD schedule w/ no signs of renal recovery  IR consulted for perma cath placement  Continues to have diarrhea, improving  Suspects symptoms d/t abx  Per GI, if no improvement may need flex sig with biopsy to assess for microscopic colitis  Cont to mon rectal tube output  Imodium prn  Cont IV Zosyn  Cont Bentyl, Questran         Vital Signs: /53   Pulse (!) 49   Temp 98 °F (36 7 °C) (Oral)   Resp 20   Ht 6' 3" (1 905 m)   Wt 113 kg (248 lb 14 4 oz)   SpO2 98%   BMI 31 11 kg/m²       Pertinent Labs/Diagnostic Results:       Results from last 7 days   Lab Units 09/21/22  0518 09/19/22  0442 09/18/22  0511 09/17/22  0454 09/16/22  0501   WBC Thousand/uL 4 93 4 44 4 12* 5 47 5 82   HEMOGLOBIN g/dL 8 1* 7 5* 7 0* 7 5* 7 6*   HEMATOCRIT % 26 8* 24 0* 22 1* 23 5* 23 6*   PLATELETS Thousands/uL 225 254 203 189 166   NEUTROS ABS Thousands/µL  --   --  2 24  --   --          Results from last 7 days   Lab Units 09/21/22  0518 09/20/22  0422 09/19/22  0442 09/18/22  0511 09/17/22  1135 09/17/22  0454 09/16/22  2356   SODIUM mmol/L 137 140 141 140 140 139 139   POTASSIUM mmol/L 3 6 3 6 3 5 3 3* 3 4* 3 7 3 9   CHLORIDE mmol/L 102 103 104 104 102 103 103   CO2 mmol/L 23 26 23 25 28 20* 21   ANION GAP mmol/L 12 11 14* 11 10 16* 15*   BUN mg/dL 24 19 39* 32* 34* 66* 68*   CREATININE mg/dL 5 84* 4 52* 6 28* 4 92* 4 22* 7 71* 7 32*   EGFR ml/min/1 73sq m 8 12 8 11 13 6 6   CALCIUM mg/dL 8 7 8 3 8 8 7 6* 8 0* 8 2* 8 6   MAGNESIUM mg/dL 1 7  --   --  2 0 1 7 2 1 2 6   PHOSPHORUS mg/dL 5 4*  --   --   --   --  6 0*  --      Results from last 7 days   Lab Units 09/21/22  0518 09/18/22  0511 09/16/22  0501   AST U/L  --  14  --    ALT U/L  --  8*  --    ALK PHOS U/L  --  87  --    TOTAL PROTEIN g/dL  --  5 3* 4 7*   ALBUMIN g/dL 2 4* 2 2*  --    TOTAL BILIRUBIN mg/dL  --  0 50  --    BILIRUBIN DIRECT mg/dL  --  0 21*  --      Results from last 7 days   Lab Units 09/21/22  1559 09/21/22  1051 09/21/22  0708 09/20/22  2104 09/20/22  1608 09/20/22  1126 09/20/22  0648 09/20/22  0611 09/19/22  2057 09/19/22  1546 09/19/22  1132 09/19/22  0558   POC GLUCOSE mg/dl 148* 159* 86 118 220* 135 103 108 121 103 194* 88     Results from last 7 days   Lab Units 09/21/22  0518 09/20/22  0422 09/19/22  0442 09/18/22  0511 09/17/22  1135 09/17/22  0454 09/16/22  2356 09/16/22  2008 09/16/22  1647 09/16/22  1216 09/16/22  0907 09/16/22  0501   GLUCOSE RANDOM mg/dL 97 90 89 107 70 90 116 148* 84 78 86 87             BETA-HYDROXYBUTYRATE   Date Value Ref Range Status   08/03/2022 0 3 <0 6 mmol/L Final      Results from last 7 days   Lab Units 09/21/22  0518 09/20/22  0422 09/19/22  0442 09/18/22  0628 09/17/22  0533   PROTIME seconds 26 3* 24 7* 25 7* 24 9*  --    INR  2 28* 2 10* 2 21* 2 12*  --    PTT seconds  --   --  64* 66* 72*     Results from last 7 days   Lab Units 09/17/22  1135   TSH 3RD GENERATON uIU/mL 0 450                     Results from last 7 days   Lab Units 09/17/22  1135   NT-PRO BNP pg/mL 14,981*         Results from last 7 days   Lab Units 09/16/22  0501   HEP B S AG  Non-reactive   HEP C AB  Non-reactive   HEP B C IGM  Non-reactive   HEP B C TOTAL AB  Non-reactive       Medications:   Scheduled Medications:  allopurinol, 300 mg, Oral, Daily  atorvastatin, 40 mg, Oral, After Dinner  calcium acetate, 667 mg, Oral, TID With Meals  cholestyramine sugar free, 4 g, Oral, BID  clotrimazole, , Topical, BID  dicyclomine, 10 mg, Oral, 4x Daily (AC & HS)  fluticasone-vilanterol, 1 puff, Inhalation, Daily  insulin lispro, 1-6 Units, Subcutaneous, TID With Meals  melatonin, 6 mg, Oral, HS  pantoprazole, 40 mg, Oral, Early Morning  piperacillin-tazobactam, 3 375 g, Intravenous, Q12H  torsemide, 40 mg, Oral, BID  [START ON 9/22/2022] warfarin, 2 mg, Oral, Daily (warfarin)      Continuous IV Infusions:     PRN Meds:  acetaminophen, 650 mg, Oral, Q6H PRN  albuterol, 2 puff, Inhalation, Q6H PRN  loperamide, 2 mg, Oral, 4x Daily PRN        Discharge Plan: D    Network Utilization Review Department  ATTENTION: Please call with any questions or concerns to [phone] and carefully listen to the prompts so that you are directed to the right person  All voicemails are confidential   Itzel Espinoza all requests for admission clinical reviews, approved or denied determinations and any other requests to dedicated fax number below belonging to the campus where the patient is receiving treatment   List of dedicated fax numbers for the Facilities:  [address] DENIALS (Administrative/Medical Necessity) [phone]   [address] (Maternity/NICU/Pediatrics) [phone]   [address] [place] [address]  [address] [facility] [place] [address] [id number] [place] [address] [address]ettina [address] [facility] [facility] [phone]

## 2022-09-21 NOTE — PLAN OF CARE
Post-Dialysis RN Treatment Note    Blood Pressure:  Pre 107/48 mm/Hg  Post 123/59 mmHg   EDW  tbd     Weight:  Pre 111 5 kg   Post 109 6 kg   Mode of weight measurement: Bed Scale   Volume Removed  1500 ml    Treatment duration 180 minutes    NS given  No    Treatment shortened? No   Medications given during Rx None Reported   Estimated Kt/V  Not Applicable   Access type: Temporary HD catheter   Access Issues: No    Report called to primary nurse   Yes Michael Huang RN    HD tx plan: 3 hrs removing 1 5L as cristhian  4K bath for K 3 6 9/21  Using R temp cath for hd, pt has IR consult for permacath placement      Problem: METABOLIC, FLUID AND ELECTROLYTES - ADULT  Goal: Electrolytes maintained within normal limits  Description: INTERVENTIONS:  - Monitor labs and assess patient for signs and symptoms of electrolyte imbalances  - Administer electrolyte replacement as ordered  - Monitor response to electrolyte replacements, including repeat lab results as appropriate  - Instruct patient on fluid and nutrition as appropriate  Outcome: Progressing     Problem: METABOLIC, FLUID AND ELECTROLYTES - ADULT  Goal: Fluid balance maintained  Description: INTERVENTIONS:  - Monitor labs   - Monitor I/O and WT  - Instruct patient on fluid and nutrition as appropriate  - Assess for signs & symptoms of volume excess or deficit  Outcome: Progressing

## 2022-09-21 NOTE — ASSESSMENT & PLAN NOTE
· 2nd episode in one month (tx with 10 days antibx 8/2-8/11)  · CTAP acute diverticulitis  · GI following  · 7/25 Colonoscopy--2 polyps removed, Mild diverticula in the descending colon and sigmoid colon, Small, internal hemorrhoids  · Zosyn D 9/10  · Questran started 9/17   · Imodium PRN  · Bentyl added 9/18 by GI  · Monitor rectal tube output, continues to have diarrhea but overall improving  · advance diet per gi recommendations  · Gi suspects symptoms 2/2 IV abx, if no improvement may need flex sig with biopsy to assess for microscopic colitis

## 2022-09-21 NOTE — PROGRESS NOTES
Progress note - Gastroenterology   Reji Height 81 y o  male MRN: [id number]  Unit/Bed#: -Dariusz Encounter: [id number]    ASSESSMENT and PLAN    Abdominal pain/acute sigmoid diverticulitis - pain finally improved   Unclear if this is just improvement of diverticulitis or decrease in bowel spasm with dicyclomine      Abdominal pain clinically improved  · Low-fiber, low residue diet  · Continue  antibiotics  · Continue dicyclomine     Diarrhea - C diff and culture negative   Suspect secondary to antibiotics    · Continue cholestyramine and  dicyclomine       Chief Complaint   Patient presents with    Abnormal Lab     Patient arrives via EMS from [facility] for an elevated creatinine of 7 3  Reports yellow emesis for a few days  Patient has RUQ and RLQ abdominal pain, is currently getting chemo once a week for kidney cancer  SUBJECTIVE/HPI   Minimal abdominal this, with diet  Tolerating diet      /55   Pulse (!) 45   Temp 97 9 °F (36 6 °C) (Oral)   Resp 19   Ht 6' 3" (1 905 m)   Wt 113 kg (248 lb 14 4 oz)   SpO2 99%   BMI 31 11 kg/m²     PHYSICALEXAM  General appearance: alert, appears stated age and cooperative  Eyes: PERLLA, EOMI, no icterus   Head: Normocephalic, without obvious abnormality, atraumatic  Lungs: clear to auscultation bilaterally  Heart: regular rate and rhythm, S1, S2 normal, no murmur, click, rub or gallop  Abdomen: soft, non-tender; bowel sounds normal; no masses,  no organomegaly  Extremities: extremities normal, atraumatic, no cyanosis or edema  Neurologic: Grossly normal    Lab Results   Component Value Date    GLUCOSE 64 (L) 08/01/2022    CALCIUM 8 7 09/21/2022     01/15/2018    K 3 6 09/21/2022    CO2 23 09/21/2022     09/21/2022    BUN 24 09/21/2022    CREATININE 5 84 (H) 09/21/2022     Lab Results   Component Value Date    WBC 4 93 09/21/2022    HGB 8 1 (L) 09/21/2022    HCT 26 8 (L) 09/21/2022    MCV 87 09/21/2022     09/21/2022     Lab Results   Component Value Date    ALT 8 (L) 09/18/2022    AST 14 09/18/2022     (H) 11/03/2019    ALKPHOS 87 09/18/2022    BILITOT 0 6 01/15/2018     No results found for: AMYLASE  Lab Results   Component Value Date    LIPASE 691 (H) 09/13/2022     Lab Results   Component Value Date    IRON 17 (L) 08/04/2022    TIBC 206 (L) 08/04/2022    FERRITIN 165 08/04/2022     Lab Results   Component Value Date    INR 2 28 (H) 09/21/2022

## 2022-09-22 ENCOUNTER — APPOINTMENT (INPATIENT)
Dept: INTERVENTIONAL RADIOLOGY/VASCULAR | Facility: HOSPITAL | Age: 72
DRG: 674 | End: 2022-09-22
Attending: RADIOLOGY
Payer: COMMERCIAL

## 2022-09-22 LAB
ALBUMIN SERPL BCP-MCNC: 2.6 G/DL (ref 3.5–5)
ANION GAP SERPL CALCULATED.3IONS-SCNC: 12 MMOL/L (ref 4–13)
ANISOCYTOSIS BLD QL SMEAR: PRESENT
ATRIAL RATE: 32 BPM
BASOPHILS # BLD AUTO: 0.04 THOUSAND/UL (ref 0–0.1)
BASOPHILS NFR MAR MANUAL: 1 % (ref 0–1)
BUN SERPL-MCNC: 14 MG/DL (ref 5–25)
CALCIUM ALBUM COR SERPL-MCNC: 9.6 MG/DL (ref 8.3–10.1)
CALCIUM SERPL-MCNC: 8.5 MG/DL (ref 8.3–10.1)
CHLORIDE SERPL-SCNC: 101 MMOL/L (ref 96–108)
CO2 SERPL-SCNC: 25 MMOL/L (ref 21–32)
CREAT SERPL-MCNC: 4.31 MG/DL (ref 0.6–1.3)
EOSINOPHIL # BLD AUTO: 0.13 THOUSAND/UL (ref 0–0.61)
EOSINOPHIL NFR BLD MANUAL: 3 % (ref 0–6)
ERYTHROCYTE [DISTWIDTH] IN BLOOD BY AUTOMATED COUNT: 18.9 % (ref 11.6–15.1)
ERYTHROCYTE [DISTWIDTH] IN BLOOD BY AUTOMATED COUNT: 19.2 % (ref 11.6–15.1)
GFR SERPL CREATININE-BSD FRML MDRD: 12 ML/MIN/1.73SQ M
GLUCOSE SERPL-MCNC: 111 MG/DL (ref 65–140)
GLUCOSE SERPL-MCNC: 113 MG/DL (ref 65–140)
GLUCOSE SERPL-MCNC: 122 MG/DL (ref 65–140)
GLUCOSE SERPL-MCNC: 144 MG/DL (ref 65–140)
GLUCOSE SERPL-MCNC: 148 MG/DL (ref 65–140)
GLUCOSE SERPL-MCNC: 175 MG/DL (ref 65–140)
HCT VFR BLD AUTO: 24 % (ref 36.5–49.3)
HCT VFR BLD AUTO: 29.6 % (ref 36.5–49.3)
HGB BLD-MCNC: 7.5 G/DL (ref 12–17)
HGB BLD-MCNC: 8.9 G/DL (ref 12–17)
INR PPP: 2.23 (ref 0.84–1.19)
LYMPHOCYTES # BLD AUTO: 1.24 THOUSAND/UL (ref 0.6–4.47)
LYMPHOCYTES # BLD AUTO: 28 %
MAGNESIUM SERPL-MCNC: 2.1 MG/DL (ref 1.6–2.6)
MCH RBC QN AUTO: 26.5 PG (ref 26.8–34.3)
MCH RBC QN AUTO: 26.5 PG (ref 26.8–34.3)
MCHC RBC AUTO-ENTMCNC: 30.1 G/DL (ref 31.4–37.4)
MCHC RBC AUTO-ENTMCNC: 31.3 G/DL (ref 31.4–37.4)
MCV RBC AUTO: 85 FL (ref 82–98)
MCV RBC AUTO: 88 FL (ref 82–98)
MONOCYTES # BLD AUTO: 0.22 THOUSAND/UL (ref 0–1.22)
MONOCYTES NFR BLD AUTO: 5 % (ref 4–12)
NEUTS SEG # BLD: 2.8 THOUSAND/UL (ref 1.81–6.82)
NEUTS SEG NFR BLD AUTO: 63 %
PHOSPHATE SERPL-MCNC: 3.8 MG/DL (ref 2.3–4.1)
PLATELET # BLD AUTO: 225 THOUSANDS/UL (ref 149–390)
PLATELET # BLD AUTO: 254 THOUSANDS/UL (ref 149–390)
PLATELET BLD QL SMEAR: ADEQUATE
PMV BLD AUTO: 10.5 FL (ref 8.9–12.7)
PMV BLD AUTO: 11.5 FL (ref 8.9–12.7)
POTASSIUM SERPL-SCNC: 3.8 MMOL/L (ref 3.5–5.3)
PROTHROMBIN TIME: 25.8 SECONDS (ref 11.6–14.5)
QRS AXIS: 43 DEGREES
QRSD INTERVAL: 130 MS
QT INTERVAL: 618 MS
QTC INTERVAL: 503 MS
RBC # BLD AUTO: 2.83 MILLION/UL (ref 3.88–5.62)
RBC # BLD AUTO: 3.36 MILLION/UL (ref 3.88–5.62)
RBC MORPH BLD: PRESENT
SODIUM SERPL-SCNC: 138 MMOL/L (ref 135–147)
T WAVE AXIS: 75 DEGREES
TOTAL CELLS COUNTED SPEC: 100
VENTRICULAR RATE: 40 BPM
WBC # BLD AUTO: 4.44 THOUSAND/UL (ref 4.31–10.16)
WBC # BLD AUTO: 6.7 THOUSAND/UL (ref 4.31–10.16)

## 2022-09-22 PROCEDURE — 99232 SBSQ HOSP IP/OBS MODERATE 35: CPT | Performed by: STUDENT IN AN ORGANIZED HEALTH CARE EDUCATION/TRAINING PROGRAM

## 2022-09-22 PROCEDURE — 99232 SBSQ HOSP IP/OBS MODERATE 35: CPT | Performed by: INTERNAL MEDICINE

## 2022-09-22 PROCEDURE — NC001 PR NO CHARGE: Performed by: RADIOLOGY

## 2022-09-22 PROCEDURE — 36558 INSERT TUNNELED CV CATH: CPT | Performed by: RADIOLOGY

## 2022-09-22 PROCEDURE — 36558 INSERT TUNNELED CV CATH: CPT

## 2022-09-22 PROCEDURE — 99152 MOD SED SAME PHYS/QHP 5/>YRS: CPT

## 2022-09-22 PROCEDURE — C1750 CATH, HEMODIALYSIS,LONG-TERM: HCPCS

## 2022-09-22 PROCEDURE — 83735 ASSAY OF MAGNESIUM: CPT | Performed by: INTERNAL MEDICINE

## 2022-09-22 PROCEDURE — 99152 MOD SED SAME PHYS/QHP 5/>YRS: CPT | Performed by: RADIOLOGY

## 2022-09-22 PROCEDURE — 93010 ELECTROCARDIOGRAM REPORT: CPT | Performed by: INTERNAL MEDICINE

## 2022-09-22 PROCEDURE — 77001 FLUOROGUIDE FOR VEIN DEVICE: CPT | Performed by: RADIOLOGY

## 2022-09-22 PROCEDURE — 80069 RENAL FUNCTION PANEL: CPT | Performed by: INTERNAL MEDICINE

## 2022-09-22 PROCEDURE — 85027 COMPLETE CBC AUTOMATED: CPT | Performed by: INTERNAL MEDICINE

## 2022-09-22 PROCEDURE — 77001 FLUOROGUIDE FOR VEIN DEVICE: CPT

## 2022-09-22 PROCEDURE — 85610 PROTHROMBIN TIME: CPT | Performed by: RADIOLOGY

## 2022-09-22 PROCEDURE — 02H633Z INSERTION OF INFUSION DEVICE INTO RIGHT ATRIUM, PERCUTANEOUS APPROACH: ICD-10-PCS | Performed by: RADIOLOGY

## 2022-09-22 PROCEDURE — 82948 REAGENT STRIP/BLOOD GLUCOSE: CPT

## 2022-09-22 RX ORDER — FENTANYL CITRATE 50 UG/ML
INJECTION, SOLUTION INTRAMUSCULAR; INTRAVENOUS CODE/TRAUMA/SEDATION MEDICATION
Status: COMPLETED | OUTPATIENT
Start: 2022-09-22 | End: 2022-09-22

## 2022-09-22 RX ORDER — MIDAZOLAM HYDROCHLORIDE 2 MG/2ML
INJECTION, SOLUTION INTRAMUSCULAR; INTRAVENOUS CODE/TRAUMA/SEDATION MEDICATION
Status: COMPLETED | OUTPATIENT
Start: 2022-09-22 | End: 2022-09-22

## 2022-09-22 RX ADMIN — PIPERACILLIN AND TAZOBACTAM 3.38 G: 3; .375 INJECTION, POWDER, LYOPHILIZED, FOR SOLUTION INTRAVENOUS at 04:49

## 2022-09-22 RX ADMIN — DICYCLOMINE HYDROCHLORIDE 10 MG: 10 CAPSULE ORAL at 06:37

## 2022-09-22 RX ADMIN — CLOTRIMAZOLE: 0.01 CREAM TOPICAL at 18:46

## 2022-09-22 RX ADMIN — ATORVASTATIN CALCIUM 40 MG: 40 TABLET, FILM COATED ORAL at 18:45

## 2022-09-22 RX ADMIN — CALCIUM ACETATE 667 MG: 667 CAPSULE ORAL at 12:15

## 2022-09-22 RX ADMIN — MIDAZOLAM 0.5 MG: 1 INJECTION INTRAMUSCULAR; INTRAVENOUS at 08:53

## 2022-09-22 RX ADMIN — CALCIUM ACETATE 667 MG: 667 CAPSULE ORAL at 18:45

## 2022-09-22 RX ADMIN — WARFARIN SODIUM 2 MG: 2 TABLET ORAL at 18:45

## 2022-09-22 RX ADMIN — FLUTICASONE FUROATE AND VILANTEROL TRIFENATATE 1 PUFF: 200; 25 POWDER RESPIRATORY (INHALATION) at 10:09

## 2022-09-22 RX ADMIN — PANTOPRAZOLE SODIUM 40 MG: 40 TABLET, DELAYED RELEASE ORAL at 06:37

## 2022-09-22 RX ADMIN — MIDAZOLAM 1 MG: 1 INJECTION INTRAMUSCULAR; INTRAVENOUS at 08:50

## 2022-09-22 RX ADMIN — FENTANYL CITRATE 25 MCG: 50 INJECTION, SOLUTION INTRAMUSCULAR; INTRAVENOUS at 08:53

## 2022-09-22 RX ADMIN — DICYCLOMINE HYDROCHLORIDE 10 MG: 10 CAPSULE ORAL at 12:15

## 2022-09-22 RX ADMIN — Medication 6 MG: at 21:13

## 2022-09-22 RX ADMIN — DICYCLOMINE HYDROCHLORIDE 10 MG: 10 CAPSULE ORAL at 21:13

## 2022-09-22 RX ADMIN — FENTANYL CITRATE 50 MCG: 50 INJECTION, SOLUTION INTRAMUSCULAR; INTRAVENOUS at 08:50

## 2022-09-22 NOTE — ASSESSMENT & PLAN NOTE
· 9/18 Bradycardia with Junctional escape beats 20's overnight with hypotension  · No rate control meds d/c in august due to bradycardia  · TSH 0 4  · Coumadin   · INR today 2 2  · Cards consulted recommending CPAP HS and occurs with apnea episodes PT REFUSES

## 2022-09-22 NOTE — PLAN OF CARE
Problem: Potential for Falls  Goal: Patient will remain free of falls  Description: INTERVENTIONS:  - Educate patient/family on patient safety including physical limitations  - Instruct patient to call for assistance with activity   - Consult OT/PT to assist with strengthening/mobility   - Keep Call bell within reach  - Keep bed low and locked with side rails adjusted as appropriate  - Keep care items and personal belongings within reach  - Initiate and maintain comfort rounds  - Make Fall Risk Sign visible to staff  - Offer Toileting every 2 Hours, in advance of need  - Initiate/Maintain bed alarm  - Obtain necessary fall risk management equipment:   - Apply yellow socks and bracelet for high fall risk patients  - Consider moving patient to room near nurses station  9/22/2022 1210 by Edi Abdullahi RN  Outcome: Progressing  9/22/2022 1209 by Edi Abdullahi RN  Outcome: Progressing     Problem: MOBILITY - ADULT  Goal: Maintain or return to baseline ADL function  Description: INTERVENTIONS:  -  Assess patient's ability to carry out ADLs; assess patient's baseline for ADL function and identify physical deficits which impact ability to perform ADLs (bathing, care of mouth/teeth, toileting, grooming, dressing, etc )  - Assess/evaluate cause of self-care deficits   - Assess range of motion  - Assess patient's mobility; develop plan if impaired  - Assess patient's need for assistive devices and provide as appropriate  - Encourage maximum independence but intervene and supervise when necessary  - Involve family in performance of ADLs  - Assess for home care needs following discharge   - Consider OT consult to assist with ADL evaluation and planning for discharge  - Provide patient education as appropriate  9/22/2022 1210 by Edi Abdullahi RN  Outcome: Progressing  9/22/2022 1209 by Edi Abdullahi RN  Outcome: Progressing  Goal: Maintains/Returns to pre admission functional level  Description: INTERVENTIONS:  - Perform BMAT or MOVE assessment daily    - Set and communicate daily mobility goal to care team and patient/family/caregiver  - Collaborate with rehabilitation services on mobility goals if consulted  - Perform Range of Motion 2 times a day  - Reposition patient every 2 hours  - Out of bed for toileting  - Record patient progress and toleration of activity level   9/22/2022 1210 by Govind Jean RN  Outcome: Progressing  9/22/2022 1209 by Govind Jean RN  Outcome: Progressing     Problem: Prexisting or High Potential for Compromised Skin Integrity  Goal: Skin integrity is maintained or improved  Description: INTERVENTIONS:  - Identify patients at risk for skin breakdown  - Assess and monitor skin integrity  - Assess and monitor nutrition and hydration status  - Monitor labs   - Assess for incontinence   - Turn and reposition patient  - Assist with mobility/ambulation  - Relieve pressure over bony prominences  - Avoid friction and shearing  - Provide appropriate hygiene as needed including keeping skin clean and dry  - Evaluate need for skin moisturizer/barrier cream  - Collaborate with interdisciplinary team   - Patient/family teaching  - Consider wound care consult   9/22/2022 1210 by Govind Jean RN  Outcome: Progressing  9/22/2022 1209 by Govind Jean RN  Outcome: Progressing     Problem: Nutrition/Hydration-ADULT  Goal: Nutrient/Hydration intake appropriate for improving, restoring or maintaining nutritional needs  Description: Monitor and assess patient's nutrition/hydration status for malnutrition  Collaborate with interdisciplinary team and initiate plan and interventions as ordered  Monitor patient's weight and dietary intake as ordered or per policy  Utilize nutrition screening tool and intervene as necessary  Determine patient's food preferences and provide high-protein, high-caloric foods as appropriate       INTERVENTIONS:  - Monitor oral intake, urinary output, labs, and treatment plans  - Assess nutrition and hydration status and recommend course of action  - Evaluate amount of meals eaten  - Assist patient with eating if necessary   - Allow adequate time for meals  - Recommend/ encourage appropriate diets, oral nutritional supplements, and vitamin/mineral supplements  - Order, calculate, and assess calorie counts as needed  - Recommend, monitor, and adjust tube feedings and TPN/PPN based on assessed needs  - Assess need for intravenous fluids  - Provide specific nutrition/hydration education as appropriate  - Include patient/family/caregiver in decisions related to nutrition  9/22/2022 1210 by Mary Peres RN  Outcome: Progressing  9/22/2022 1209 by Mary Peres RN  Outcome: Progressing     Problem: GASTROINTESTINAL - ADULT  Goal: Minimal or absence of nausea and/or vomiting  Description: INTERVENTIONS:  - Administer IV fluids if ordered to ensure adequate hydration  - Maintain NPO status until nausea and vomiting are resolved  - Nasogastric tube if ordered  - Administer ordered antiemetic medications as needed  - Provide nonpharmacologic comfort measures as appropriate  - Advance diet as tolerated, if ordered  - Consider nutrition services referral to assist patient with adequate nutrition and appropriate food choices  9/22/2022 1210 by Mary Peres RN  Outcome: Progressing  9/22/2022 1209 by Mary Peres RN  Outcome: Progressing  Goal: Maintains or returns to baseline bowel function  Description: INTERVENTIONS:  - Assess bowel function  - Encourage oral fluids to ensure adequate hydration  - Administer IV fluids if ordered to ensure adequate hydration  - Administer ordered medications as needed  - Encourage mobilization and activity  - Consider nutritional services referral to assist patient with adequate nutrition and appropriate food choices  9/22/2022 1210 by Mary Peres RN  Outcome: Progressing  9/22/2022 1209 by Kye Bonilla Naren Shine RN  Outcome: Progressing  Goal: Maintains adequate nutritional intake  Description: INTERVENTIONS:  - Monitor percentage of each meal consumed  - Identify factors contributing to decreased intake, treat as appropriate  - Assist with meals as needed  - Monitor I&O, weight, and lab values if indicated  - Obtain nutrition services referral as needed  9/22/2022 1210 by Jordan Payan RN  Outcome: Progressing  9/22/2022 1209 by Jordan Payan RN  Outcome: Progressing     Problem: GENITOURINARY - ADULT  Goal: Maintains or returns to baseline urinary function  Description: INTERVENTIONS:  - Assess urinary function  - Encourage oral fluids to ensure adequate hydration if ordered  - Administer IV fluids as ordered to ensure adequate hydration  - Administer ordered medications as needed  - Offer frequent toileting  - Follow urinary retention protocol if ordered  9/22/2022 1210 by Jordan Payan RN  Outcome: Progressing  9/22/2022 1209 by Jordan Payan RN  Outcome: Progressing  Goal: Absence of urinary retention  Description: INTERVENTIONS:  - Assess patients ability to void and empty bladder  - Monitor I/O  - Bladder scan as needed  - Discuss with physician/AP medications to alleviate retention as needed  - Discuss catheterization for long term situations as appropriate  9/22/2022 1210 by Jordan Payan RN  Outcome: Progressing  9/22/2022 1209 by Jordan Payan RN  Outcome: Progressing  Goal: Urinary catheter remains patent  Description: INTERVENTIONS:  - Assess patency of urinary catheter  - If patient has a chronic mehta, consider changing catheter if non-functioning  - Follow guidelines for intermittent irrigation of non-functioning urinary catheter  9/22/2022 1210 by Jordan Payan RN  Outcome: Progressing  9/22/2022 1209 by Jordan Payan RN  Outcome: Progressing     Problem: METABOLIC, FLUID AND ELECTROLYTES - ADULT  Goal: Electrolytes maintained within normal limits  Description: INTERVENTIONS:  - Monitor labs and assess patient for signs and symptoms of electrolyte imbalances  - Administer electrolyte replacement as ordered  - Monitor response to electrolyte replacements, including repeat lab results as appropriate  - Instruct patient on fluid and nutrition as appropriate  9/22/2022 1210 by Skip Whiteside RN  Outcome: Progressing  9/22/2022 1209 by Skip Whiteside RN  Outcome: Progressing  Goal: Fluid balance maintained  Description: INTERVENTIONS:  - Monitor labs   - Monitor I/O and WT  - Instruct patient on fluid and nutrition as appropriate  - Assess for signs & symptoms of volume excess or deficit  9/22/2022 1210 by Skip Whiteside RN  Outcome: Progressing  9/22/2022 1209 by Skip Whiteside RN  Outcome: Progressing     Problem: PAIN - ADULT  Goal: Verbalizes/displays adequate comfort level or baseline comfort level  Description: Interventions:  - Encourage patient to monitor pain and request assistance  - Assess pain using appropriate pain scale  - Administer analgesics based on type and severity of pain and evaluate response  - Implement non-pharmacological measures as appropriate and evaluate response  - Consider cultural and social influences on pain and pain management  - Notify physician/advanced practitioner if interventions unsuccessful or patient reports new pain  9/22/2022 1210 by Skip Whiteside RN  Outcome: Progressing  9/22/2022 1209 by Skip Whiteside RN  Outcome: Progressing     Problem: INFECTION - ADULT  Goal: Absence or prevention of progression during hospitalization  Description: INTERVENTIONS:  - Assess and monitor for signs and symptoms of infection  - Monitor lab/diagnostic results  - Monitor all insertion sites, i e  indwelling lines, tubes, and drains  - Monitor endotracheal if appropriate and nasal secretions for changes in amount and color  - [place] appropriate cooling/warming therapies per order  - Administer medications as ordered  - Instruct and encourage patient and family to use good hand hygiene technique  - Identify and instruct in appropriate isolation precautions for identified infection/condition  9/22/2022 1210 by Evy Paniagua RN  Outcome: Progressing  9/22/2022 1209 by Evy Paniagua RN  Outcome: Progressing  Goal: Absence of fever/infection during neutropenic period  Description: INTERVENTIONS:  - Monitor WBC    9/22/2022 1210 by Evy Paniagua RN  Outcome: Progressing  9/22/2022 1209 by Evy Paniagua RN  Outcome: Progressing     Problem: SAFETY ADULT  Goal: Patient will remain free of falls  Description: INTERVENTIONS:  - Educate patient/family on patient safety including physical limitations  - Instruct patient to call for assistance with activity   - Consult OT/PT to assist with strengthening/mobility   - Keep Call bell within reach  - Keep bed low and locked with side rails adjusted as appropriate  - Keep care items and personal belongings within reach  - Initiate and maintain comfort rounds  - Make Fall Risk Sign visible to staff  - Offer Toileting every 2 Hours, in advance of need  - Initiate/Maintain bed alarm  - Obtain necessary fall risk management equipment:   - Apply yellow socks and bracelet for high fall risk patients  - Consider moving patient to room near nurses station  9/22/2022 1210 by Evy Paniagua RN  Outcome: Progressing  9/22/2022 1209 by Evy Paniagua RN  Outcome: Progressing  Goal: Maintain or return to baseline ADL function  Description: INTERVENTIONS:  -  Assess patient's ability to carry out ADLs; assess patient's baseline for ADL function and identify physical deficits which impact ability to perform ADLs (bathing, care of mouth/teeth, toileting, grooming, dressing, etc )  - Assess/evaluate cause of self-care deficits   - Assess range of motion  - Assess patient's mobility; develop plan if impaired  - Assess patient's need for assistive devices and provide as appropriate  - Encourage maximum independence but intervene and supervise when necessary  - Involve family in performance of ADLs  - Assess for home care needs following discharge   - Consider OT consult to assist with ADL evaluation and planning for discharge  - Provide patient education as appropriate  9/22/2022 1210 by Arty Dandy, RN  Outcome: Progressing  9/22/2022 1209 by Arty Dandy, RN  Outcome: Progressing  Goal: Maintains/Returns to pre admission functional level  Description: INTERVENTIONS:  - Perform BMAT or MOVE assessment daily    - Set and communicate daily mobility goal to care team and patient/family/caregiver  - Collaborate with rehabilitation services on mobility goals if consulted  - Perform Range of Motion 2 times a day  - Reposition patient every 2 hours      - Out of bed for toileting  - Record patient progress and toleration of activity level   9/22/2022 1210 by Arty Dandy, RN  Outcome: Progressing  9/22/2022 1209 by Arty Dandy, RN  Outcome: Progressing     Problem: DISCHARGE PLANNING  Goal: Discharge to home or other facility with appropriate resources  Description: INTERVENTIONS:  - Identify barriers to discharge w/patient and caregiver  - Arrange for needed discharge resources and transportation as appropriate  - Identify discharge learning needs (meds, wound care, etc )  - Arrange for interpretive services to assist at discharge as needed  - Refer to Case Management Department for coordinating discharge planning if the patient needs post-hospital services based on physician/advanced practitioner order or complex needs related to functional status, cognitive ability, or social support system  9/22/2022 1210 by Arty Dandy, RN  Outcome: Progressing  9/22/2022 1209 by Arty Dandy, RN  Outcome: Progressing     Problem: Knowledge Deficit  Goal: Patient/family/caregiver demonstrates understanding of disease process, treatment plan, medications, and discharge instructions  Description: Complete learning assessment and assess knowledge base    Interventions:  - Provide teaching at level of understanding  - Provide teaching via preferred learning methods  9/22/2022 1210 by Edi Abdullahi RN  Outcome: Progressing  9/22/2022 1209 by Edi Abdullahi, RN  Outcome: Progressing

## 2022-09-22 NOTE — PLAN OF CARE
Problem: Potential for Falls  Goal: Patient will remain free of falls  Description: INTERVENTIONS:  - Educate patient/family on patient safety including physical limitations  - Instruct patient to call for assistance with activity   - Consult OT/PT to assist with strengthening/mobility   - Keep Call bell within reach  - Keep bed low and locked with side rails adjusted as appropriate  - Keep care items and personal belongings within reach  - Initiate and maintain comfort rounds  - Make Fall Risk Sign visible to staff  - Offer Toileting every x Hours, in advance of need  - Initiate/Maintain xalarm  - Obtain necessary fall risk management equipment: x  - Apply yellow socks and bracelet for high fall risk patients  - Consider moving patient to room near nurses station  Outcome: Progressing     Problem: MOBILITY - ADULT  Goal: Maintain or return to baseline ADL function  Description: INTERVENTIONS:  -  Assess patient's ability to carry out ADLs; assess patient's baseline for ADL function and identify physical deficits which impact ability to perform ADLs (bathing, care of mouth/teeth, toileting, grooming, dressing, etc )  - Assess/evaluate cause of self-care deficits   - Assess range of motion  - Assess patient's mobility; develop plan if impaired  - Assess patient's need for assistive devices and provide as appropriate  - Encourage maximum independence but intervene and supervise when necessary  - Involve family in performance of ADLs  - Assess for home care needs following discharge   - Consider OT consult to assist with ADL evaluation and planning for discharge  - Provide patient education as appropriate  Outcome: Progressing  Goal: Maintains/Returns to pre admission functional level  Description: INTERVENTIONS:  - Perform BMAT or MOVE assessment daily    - Set and communicate daily mobility goal to care team and patient/family/caregiver     - Collaborate with rehabilitation services on mobility goals if consulted  - Perform Range of Motion x times a day  - Reposition patient every x hours  - Dangle patient x times a day  - Stand patient x times a day  - Ambulate patient x times a day  - Out of bed to chair x times a day   - Out of bed for meals x times a day  - Out of bed for toileting  - Record patient progress and toleration of activity level   Outcome: Progressing     Problem: Prexisting or High Potential for Compromised Skin Integrity  Goal: Skin integrity is maintained or improved  Description: INTERVENTIONS:  - Identify patients at risk for skin breakdown  - Assess and monitor skin integrity  - Assess and monitor nutrition and hydration status  - Monitor labs   - Assess for incontinence   - Turn and reposition patient  - Assist with mobility/ambulation  - Relieve pressure over bony prominences  - Avoid friction and shearing  - Provide appropriate hygiene as needed including keeping skin clean and dry  - Evaluate need for skin moisturizer/barrier cream  - Collaborate with interdisciplinary team   - Patient/family teaching  - Consider wound care consult   Outcome: Progressing     Problem: Nutrition/Hydration-ADULT  Goal: Nutrient/Hydration intake appropriate for improving, restoring or maintaining nutritional needs  Description: Monitor and assess patient's nutrition/hydration status for malnutrition  Collaborate with interdisciplinary team and initiate plan and interventions as ordered  Monitor patient's weight and dietary intake as ordered or per policy  Utilize nutrition screening tool and intervene as necessary  Determine patient's food preferences and provide high-protein, high-caloric foods as appropriate       INTERVENTIONS:  - Monitor oral intake, urinary output, labs, and treatment plans  - Assess nutrition and hydration status and recommend course of action  - Evaluate amount of meals eaten  - Assist patient with eating if necessary   - Allow adequate time for meals  - Recommend/ encourage appropriate diets, oral nutritional supplements, and vitamin/mineral supplements  - Order, calculate, and assess calorie counts as needed  - Recommend, monitor, and adjust tube feedings and TPN/PPN based on assessed needs  - Assess need for intravenous fluids  - Provide specific nutrition/hydration education as appropriate  - Include patient/family/caregiver in decisions related to nutrition  Outcome: Progressing     Problem: GASTROINTESTINAL - ADULT  Goal: Minimal or absence of nausea and/or vomiting  Description: INTERVENTIONS:  - Administer IV fluids if ordered to ensure adequate hydration  - Maintain NPO status until nausea and vomiting are resolved  - Nasogastric tube if ordered  - Administer ordered antiemetic medications as needed  - Provide nonpharmacologic comfort measures as appropriate  - Advance diet as tolerated, if ordered  - Consider nutrition services referral to assist patient with adequate nutrition and appropriate food choices  Outcome: Progressing  Goal: Maintains or returns to baseline bowel function  Description: INTERVENTIONS:  - Assess bowel function  - Encourage oral fluids to ensure adequate hydration  - Administer IV fluids if ordered to ensure adequate hydration  - Administer ordered medications as needed  - Encourage mobilization and activity  - Consider nutritional services referral to assist patient with adequate nutrition and appropriate food choices  Outcome: Progressing  Goal: Maintains adequate nutritional intake  Description: INTERVENTIONS:  - Monitor percentage of each meal consumed  - Identify factors contributing to decreased intake, treat as appropriate  - Assist with meals as needed  - Monitor I&O, weight, and lab values if indicated  - Obtain nutrition services referral as needed  Outcome: Progressing     Problem: GENITOURINARY - ADULT  Goal: Maintains or returns to baseline urinary function  Description: INTERVENTIONS:  - Assess urinary function  - Encourage oral fluids to ensure adequate hydration if ordered  - Administer IV fluids as ordered to ensure adequate hydration  - Administer ordered medications as needed  - Offer frequent toileting  - Follow urinary retention protocol if ordered  Outcome: Progressing  Goal: Absence of urinary retention  Description: INTERVENTIONS:  - Assess patients ability to void and empty bladder  - Monitor I/O  - Bladder scan as needed  - Discuss with physician/AP medications to alleviate retention as needed  - Discuss catheterization for long term situations as appropriate  Outcome: Progressing  Goal: Urinary catheter remains patent  Description: INTERVENTIONS:  - Assess patency of urinary catheter  - If patient has a chronic mehta, consider changing catheter if non-functioning  - Follow guidelines for intermittent irrigation of non-functioning urinary catheter  Outcome: Progressing     Problem: METABOLIC, FLUID AND ELECTROLYTES - ADULT  Goal: Electrolytes maintained within normal limits  Description: INTERVENTIONS:  - Monitor labs and assess patient for signs and symptoms of electrolyte imbalances  - Administer electrolyte replacement as ordered  - Monitor response to electrolyte replacements, including repeat lab results as appropriate  - Instruct patient on fluid and nutrition as appropriate  Outcome: Progressing  Goal: Fluid balance maintained  Description: INTERVENTIONS:  - Monitor labs   - Monitor I/O and WT  - Instruct patient on fluid and nutrition as appropriate  - Assess for signs & symptoms of volume excess or deficit  Outcome: Progressing     Problem: PAIN - ADULT  Goal: Verbalizes/displays adequate comfort level or baseline comfort level  Description: Interventions:  - Encourage patient to monitor pain and request assistance  - Assess pain using appropriate pain scale  - Administer analgesics based on type and severity of pain and evaluate response  - Implement non-pharmacological measures as appropriate and evaluate response  - Consider cultural and social influences on pain and pain management  - Notify physician/advanced practitioner if interventions unsuccessful or patient reports new pain  Outcome: Progressing     Problem: INFECTION - ADULT  Goal: Absence or prevention of progression during hospitalization  Description: INTERVENTIONS:  - Assess and monitor for signs and symptoms of infection  - Monitor lab/diagnostic results  - Monitor all insertion sites, i e  indwelling lines, tubes, and drains  - Monitor endotracheal if appropriate and nasal secretions for changes in amount and color  - Shepherd appropriate cooling/warming therapies per order  - Administer medications as ordered  - Instruct and encourage patient and family to use good hand hygiene technique  - Identify and instruct in appropriate isolation precautions for identified infection/condition  Outcome: Progressing  Goal: Absence of fever/infection during neutropenic period  Description: INTERVENTIONS:  - Monitor WBC    Outcome: Progressing     Problem: SAFETY ADULT  Goal: Patient will remain free of falls  Description: INTERVENTIONS:  - Educate patient/family on patient safety including physical limitations  - Instruct patient to call for assistance with activity   - Consult OT/PT to assist with strengthening/mobility   - Keep Call bell within reach  - Keep bed low and locked with side rails adjusted as appropriate  - Keep care items and personal belongings within reach  - Initiate and maintain comfort rounds  - Make Fall Risk Sign visible to staff  - Offer Toileting every x Hours, in advance of need  - Initiate/Maintain xalarm  - Obtain necessary fall risk management equipment: x  - Apply yellow socks and bracelet for high fall risk patients  - Consider moving patient to room near nurses station  Outcome: Progressing  Goal: Maintain or return to baseline ADL function  Description: INTERVENTIONS:  -  Assess patient's ability to carry out ADLs; assess patient's baseline for ADL function and identify physical deficits which impact ability to perform ADLs (bathing, care of mouth/teeth, toileting, grooming, dressing, etc )  - Assess/evaluate cause of self-care deficits   - Assess range of motion  - Assess patient's mobility; develop plan if impaired  - Assess patient's need for assistive devices and provide as appropriate  - Encourage maximum independence but intervene and supervise when necessary  - Involve family in performance of ADLs  - Assess for home care needs following discharge   - Consider OT consult to assist with ADL evaluation and planning for discharge  - Provide patient education as appropriate  Outcome: Progressing  Goal: Maintains/Returns to pre admission functional level  Description: INTERVENTIONS:  - Perform BMAT or MOVE assessment daily    - Set and communicate daily mobility goal to care team and patient/family/caregiver  - Collaborate with rehabilitation services on mobility goals if consulted  - Perform Range of Motion x times a day  - Reposition patient every x hours    - Dangle patient x times a day  - Stand patient x times a day  - Ambulate patient xxx times a day  - Out of bed to chair x times a day   - Out of bed for meals x times a day  - Out of bed for toileting  - Record patient progress and toleration of activity level   Outcome: Progressing     Problem: DISCHARGE PLANNING  Goal: Discharge to home or other facility with appropriate resources  Description: INTERVENTIONS:  - Identify barriers to discharge w/patient and caregiver  - Arrange for needed discharge resources and transportation as appropriate  - Identify discharge learning needs (meds, wound care, etc )  - Arrange for interpretive services to assist at discharge as needed  - Refer to Case Management Department for coordinating discharge planning if the patient needs post-hospital services based on physician/advanced practitioner order or complex needs related to functional status, cognitive ability, or social support system  Outcome: Progressing     Problem: Knowledge Deficit  Goal: Patient/family/caregiver demonstrates understanding of disease process, treatment plan, medications, and discharge instructions  Description: Complete learning assessment and assess knowledge base    Interventions:  - Provide teaching at level of understanding  - Provide teaching via preferred learning methods  Outcome: Progressing   x

## 2022-09-22 NOTE — DISCHARGE INSTRUCTIONS
Perma-cath Placement   WHAT YOU NEED TO KNOW:   A perma-cath is a catheter placed through a vein into or near your right atrium  Your right atrium is the right upper chamber of your heart  A perma-cath is used for dialysis in an emergency or until a long-term device is ready to use  After your procedure, you will have some pain and swelling on your chest and neck  You may have some bruises on your chest and neck  You may also have 2 dressings, one on your chest and one on your neck  DISCHARGE INSTRUCTIONS:   Call 911 for any of the following: You feel lightheaded, short of breath, and have chest pain  Your catheter comes out   Contact Interventional Radiology at [phone] [place] PATIENTS: Contact Interventional Radiology at [phone]) Daphnie Andujar PATIENTS: Contact Interventional Radiology at [phone]) if:  Blood soaks through your bandage  You have new swelling in your arm, neck, face, or chest on your right side  Your catheter gets wet  Your bruises or pain get worse  You have a fever or chills  Persistent nausea or vomiting  Your incision is red, swollen, or draining pus  You have questions or concerns about your condition or care  Self-care:       Resume your normal diet  Keep your dressings dry  Do not take a shower or swim  You may take a tub bath, but do not get your dressings wet  Water in your wound can cause bacteria to grow and cause an infection  If your dressing gets wet, dry it off and cover it with dry sterile gauze  Call your healthcare provider  Do not use soaps or ointments  Do not change your dressings  Your healthcare provider or dialysis nurse will change your dressings  Your dressings should stay in place until your healthcare provider removes them  The dressing on your chest will stay as long as you have the catheter in place  The dressing prevents infection  Do not remove the red and blue caps from the end of your catheter   The caps prevent air from getting into your catheter  Follow up with your healthcare provider as directed: Write down your questions so you remember to ask them during your visits  TUBE CARE INSTRUCTIONS    Care after your procedure:    Resume your normal diet  Small sips of flat soda will help with nausea  1  The properly functioning catheter should be forward flushed once (1x) daily with 10ml of normal saline using clean technique  You will be given a prescription for flushes  To flush the tube, clean both connections with alcohol swab  Twist off the drainage bag/ bulb  tubing and twist the saline syringe into the drainage tube and flush  Remove the syringe and twist the drainage bag / bulb tubing tubing back on     2  The drainage bag/bulb may be emptied as necessary  Keep a record of the amount of fluid you drain from your tube  This should be done with clean technique as well  3  A fresh dressing should be applied daily over the tube insertion site  4  As the tube is secured to the skin with only a suture,try not to pull on your tube  Tub baths are not permitted  Showers are permitted if the patient's skin entry site is prevented from getting wet  Similarly, washcloth "baths" are acceptable  Contact Interventional Radiology at [phone] [place] PATIENTS: Contact Interventional Radiology at [phone]) Tiffany Shepherd PATIENTS: Contact Interventional Radiology at [phone]) if:    1  Leakage or large amounts of liquid around the catheter  2  Fever of 101 degrees lasting several hours without other obvious cause (such as sore throat, flu, etc)  3  Persistent nausea or vomiting  4  Diminished drainage, which may be associated with pressure or pain  Or when the     drainage from your tube is less than 10mls for 48 hours  5  Catheter pulled back or falls out  The following pharmacies carry the flush syringes         [address]                         [phone]  [facility]  Phone [phone]            Phone [phone] [address] [facility]                                [phone]  [address] [place] [address] Daisy SUTTON                      [address] [facility]  Phone [phone]            Phone [phone]                      Madhavi Howard                                                                                                          [phone]  [facility]  [facility]    [address]  Phone [phone]

## 2022-09-22 NOTE — PROGRESS NOTES
H&P reviewed  /51   Pulse 57   Temp 98 6 °F (37 °C)   Resp 20   Ht 6' 3" (1 905 m)   Wt 111 kg (245 lb 2 4 oz)   SpO2 96%   BMI 30 64 kg/m²     Prior imaging was reviewed  Here for temporary to tunneled conversion of dialysis catheter  Procedure discussed and questions answered  Informed written consent was obtained      Cesar Lezama MD

## 2022-09-22 NOTE — CASE MANAGEMENT
Case Management Discharge Planning Note    Patient name Airam Espinosa  Location /-13 MRN [id number]  : 1950 Date 2022       Current Admission Date: 2022  Current Admission Diagnosis:DORA (acute kidney injury) [facility])   Patient Active Problem List    Diagnosis Date Noted    Hypokalemia due to excessive gastrointestinal loss of potassium 09/15/2022    Abnormal CT scan 2022    Acute diverticulitis 2022    Stage 3b chronic [facility] disease [facility] ) 2022    Anemia 10/04/2021    Supratherapeutic INR 10/04/2021    Ambulatory dysfunction 2021    Need for influenza vaccination 2020    Venous insufficiency (chronic) (peripheral) 2020    Gout 2019    Mass of psoas muscle 2019    Hiatal hernia 10/29/2019    Above knee amputation of left lower extremity ([facility] ) 2019    Multiple myeloma ([facility] ) 2019    Chronic obstructive pulmonary disease, unspecified ([facility] ) 2019    Hypertensive chronic kidney disease w stg 1-4/unsp chr kdny 2019    DORA (acute kidney injury) ([facility] ) 2019    Diabetes mellitus, type 2 ([facility] ) 2019    NALLELY (obstructive sleep apnea) 2019    Moderate persistent asthma without complication     Peripheral vascular disease ([facility] ) 2019    Chronic combined systolic and diastolic congestive heart failure ([facility] ) 10/11/2017    Chronic atrial fibrillation ([facility] ) 10/09/2017    Morbid obesity ([facility] ) 10/09/2017    Gallstones 2017    Diarrhea 2016    Chronic venous hypertension, right 09/15/2016    Onychomycosis 10/27/2015    Diabetes, polyneuropathy ([facility] ) 2014    GERD (gastroesophageal reflux disease) 2014    Hyperlipidemia 2014      LOS (days): 10  Geometric Mean LOS (GMLOS) (days): 3 10  Days to GMLOS:-6 6     OBJECTIVE:  Risk of Unplanned Readmission Score: 36 39         Current admission status: Inpatient   Preferred Pharmacy:   [facility] [address]  [address] [address]  Phone: [phone] Fax: [phone]    Primary Care Provider: Zenia Arriaza MD    Primary Insurance: Patti Garcia [address]  Secondary Insurance:     DISCHARGE DETAILS:  Received consult, patient will need an out patient dialysis  Met with patient and informed him of the above, he was aware and is agreeable  He is a resident of [facility] and is a MA bed hold  Placed call to [facility] [employer] at [facility] and was informed transportation can be provided by SNF  Admission Intake Form was faxed to [facility] at [phone]  Wait outpatient dialysis chair availability

## 2022-09-22 NOTE — PROGRESS NOTES
NEPHROLOGY PROGRESS NOTE   Pankaj Rees 81 y o  male MRN: [id number]  Unit/Bed#: -01 Encounter: [id number]  Reason for Consult: DORA (POA) on CKD IIIB    57-year-old male with history CKD stage IIIB, combined CHF, atrial fibrillation, morbid obesity, multiple myeloma, who presents to the emergency department from nursing home with nausea, vomiting, diarrhea, and abnormal laboratory values on outpatient blood work  There was concern for acute cholecystitis  Nephrology consulted for acute kidney injury    ASSESSMENT/PLAN:  DORA (POA) on CKD IIIB:  Suspected prerenal due to volume depletion with high GI output with progression to ATN  -baseline creatinine previously in the 2s   -initiated on HD 9/16 due to oliguria, 2nd treatment 9/17 with 3rd treatment on 09/19   -currently following Monday Wednesday Friday schedule for hemodialysis needs  -next hemodialysis planned for Friday   -monitoring daily for renal recovery  Currently no evidence of renal recovery   -case management assisting with outpatient HD unit placement  -CT scan negative for hydro  -UA negative   -strict I/O   patient has been incontinent   -estimated dry weight to be determined  Last post HD weight 109 6 kg      Access:  Temporary to PermCath conversion 09/21/2022      Hypotension:   overall acceptable with periods of hypotension   -current medication: Torsemide 40 mg po BID  Will discontinue once patient has been hypotensive with little urine output  -recommend avoiding hypotension or high fluctuations in blood pressure   -recommend maintaining map greater than 65 mmHg       Volume status/combined CHF:  With EF of 45%  -torsemide discontinued due to hypotension and with little urine output   -monitoring daily weights, fluid restriction, strict I/O   -will attempt UF with HD      Electrolytes:  -previous acidosis, currently resolved   -previous hypokalemia, suspect due to diarrhea and acidosis    Will continue to monitor and trend on HD      Anemia:  With history of multiple myeloma  Following with Hematology as outpatient  -on Velcade and Decadron for treatment of multiple myeloma, last treatment on 09/06/2022   -avoiding Epogen   -check iron panel   -continue to monitor and transfuse as needed for hemoglobin less than 7 0      MBD in CKD:  -magnesium level normal, phosphorus elevated to 5 4 but improved from previous level   -recommend renal diet   -continues on calcium acetate with meals      Acute diverticulitis:  Currently with rectal tube and on Bentyl, Imodium, and Questran  GI team following   -C diff negative      Other:  Atrial fibrillation, morbid obesity, COPD, gout, diabetes, hyperlipidemia  Disposition:  Okay to discharge from Renal once outpatient dialysis is arranged and patient is medically cleared  SUBJECTIVE:  The patient is resting in his bed  He denies chest discomfort or shortness of breath  He denies nausea or vomiting  He continues to have a rectal tube  We discussed possibly place an external catheter for accurate I/O      OBJECTIVE:  Current Weight: Weight - Scale: 111 kg (245 lb 2 4 oz)  Vitals:    09/22/22 0841 09/22/22 0844 09/22/22 0849 09/22/22 0854   BP: 110/63 104/55 108/54 (!) 80/46   Pulse: 64 57 63 61   Resp: (!) 0 (!) 0 (!) 28 17   Temp:       TempSrc:       SpO2: 97% 97% 95% 93%   Weight:       Height:           Intake/Output Summary (Last 24 hours) at 9/22/2022 4978  Last data filed at 9/21/2022 1839  Gross per 24 hour   Intake 1580 ml   Output 2600 ml   Net -1020 ml     General: NAD  Skin: warm, dry, intact, no rash  HEENT: Moist mucous membranes, sclera anicteric, normocephalic, atraumatic  Neck: No apparent JVD appreciated  Chest: lung sounds clear B/L, on RA, PermCath in place   CVS:Regular rate and rhythm, no murmer   Abdomen: Soft, round, non-tender, +BS, rectal tube, obese abdomen  Extremities:  Left lower extremity amputation  Neuro: alert and oriented  Psych: appropriate mood and affect     Medications:    Current Facility-Administered Medications:     acetaminophen (TYLENOL) tablet 650 mg, 650 mg, Oral, Q6H PRN, Chel Sifuentes PA-C    albuterol (PROVENTIL HFA,VENTOLIN HFA) inhaler 2 puff, 2 puff, Inhalation, Q6H PRN, Chel Sifuentes PA-C    allopurinol (ZYLOPRIM) tablet 300 mg, 300 mg, Oral, Daily, Lolis Foote PA-C, 300 mg at 09/21/22 0905    atorvastatin (LIPITOR) tablet 40 mg, 40 mg, Oral, After Dinner, Chel Sifuentes PA-C, 40 mg at 09/21/22 1718    calcium acetate (PHOSLO) capsule 667 mg, 667 mg, Oral, TID With Meals, NICOLA Grey, 667 mg at 09/21/22 1120    cholestyramine sugar free (QUESTRAN LIGHT) packet 4 g, 4 g, Oral, BID, Lolis Foote PA-C, 4 g at 09/21/22 1718    clotrimazole (LOTRIMIN) 1 % cream, , Topical, BID, Chel Sifuentes PA-C, Given at 09/21/22 1712    dicyclomine (BENTYL) capsule 10 mg, 10 mg, Oral, 4x Daily (AC & HS), Lolis Foote PA-C, 10 mg at 09/22/22 0079    fentanyl citrate (PF) 100 MCG/2ML, , Intravenous, Code/Trauma/Sedation Med, Silvano Iglesias MD, 25 mcg at 09/22/22 0853    fluticasone-vilanterol (BREO ELLIPTA) 200-25 MCG/INH inhaler 1 puff, 1 puff, Inhalation, Daily, Lolis Foote PA-C, 1 puff at 09/21/22 0905    insulin lispro (HumaLOG) 100 units/mL subcutaneous injection 1-6 Units, 1-6 Units, Subcutaneous, TID With Meals, 1 Units at 09/21/22 1121 **AND** Fingerstick Glucose (POCT), , , 4x Daily AC and at bedtime, Chel Sifuentes PA-C    loperamide (IMODIUM) capsule 2 mg, 2 mg, Oral, 4x Daily PRN, Chel Sifuentes PA-C, 2 mg at 09/20/22 1453    melatonin tablet 6 mg, 6 mg, Oral, HS, Lolis Foote PA-C, 6 mg at 09/21/22 2121    midazolam (VERSED) injection, , , Code/Trauma/Sedation Med, Silvano Iglesias MD, 0 5 mg at 09/22/22 0853    pantoprazole (PROTONIX) EC tablet 40 mg, 40 mg, Oral, Early Morning, Lolis Foote PA-C, 40 mg at 09/22/22 0637    piperacillin-tazobactam (ZOSYN) 3 375 g in sodium chloride 0 9 % 100 mL IVPB (EXTENDED-INFUSION), 3 375 g, Intravenous, Q12H, Mary Pineda DO, Last Rate: 25 mL/hr at 09/22/22 0449, 3 375 g at 09/22/22 0449    torsemide (DEMADEX) tablet 40 mg, 40 mg, Oral, BID, Lolis Foote PA-C, 40 mg at 09/21/22 1776    warfarin (COUMADIN) tablet 2 mg, 2 mg, Oral, Daily (warfarin), Mary Pineda DO    Laboratory Results:  Results from last 7 days   Lab Units 09/22/22  0449 09/21/22  0518 09/20/22  0422 09/19/22  0442 09/18/22  0511 09/17/22  1135 09/17/22  0454   WBC Thousand/uL 6 70 4 93  --  4 44 4 12*  --  5 47   HEMOGLOBIN g/dL 8 9* 8 1*  --  7 5* 7 0*  --  7 5*   HEMATOCRIT % 29 6* 26 8*  --  24 0* 22 1*  --  23 5*   PLATELETS Thousands/uL 225 225  --  254 203  --  189   SODIUM mmol/L 138 137 140 141 140   < > 139   POTASSIUM mmol/L 3 8 3 6 3 6 3 5 3 3*   < > 3 7   CHLORIDE mmol/L 101 102 103 104 104   < > 103   CO2 mmol/L 25 23 26 23 25   < > 20*   BUN mg/dL 14 24 19 39* 32*   < > 66*   CREATININE mg/dL 4 31* 5 84* 4 52* 6 28* 4 92*   < > 7 71*   CALCIUM mg/dL 8 5 8 7 8 3 8 8 7 6*   < > 8 2*   MAGNESIUM mg/dL 2 1 1 7  --   --  2 0   < > 2 1   PHOSPHORUS mg/dL 3 8 5 4*  --   --   --   --  6 0*   ALK PHOS U/L  --   --   --   --  87  --   --    ALT U/L  --   --   --   --  8*  --   --    AST U/L  --   --   --   --  14  --   --     < > = values in this interval not displayed

## 2022-09-22 NOTE — CASE MANAGEMENT
Case Management Discharge Planning Note    Patient name Andrew Leader  Location /-69 MRN [id number]  : 1950 Date 2022       Current Admission Date: 2022  Current Admission Diagnosis:DORA (acute kidney injury) [facility])   Patient Active Problem List    Diagnosis Date Noted    Hypokalemia due to excessive gastrointestinal loss of potassium 09/15/2022    Abnormal CT scan 2022    Acute diverticulitis 2022    Stage 3b chronic [facility] disease [facility] ) 2022    Anemia 10/04/2021    Supratherapeutic INR 10/04/2021    Ambulatory dysfunction 2021    Need for influenza vaccination 2020    Venous insufficiency (chronic) (peripheral) 2020    Gout 2019    Mass of psoas muscle 2019    Hiatal hernia 10/29/2019    Above knee amputation of left lower extremity ([facility] ) 2019    Multiple myeloma ([facility] ) 2019    Chronic obstructive pulmonary disease, unspecified ([facility] ) 2019    Hypertensive chronic kidney disease w stg 1-4/unsp chr kdny 2019    DORA (acute kidney injury) ([facility] ) 2019    Diabetes mellitus, type 2 ([facility] ) 2019    NALLELY (obstructive sleep apnea) 2019    Moderate persistent asthma without complication     Peripheral vascular disease ([facility] ) 2019    Chronic combined systolic and diastolic congestive heart failure ([facility] ) 10/11/2017    Chronic atrial fibrillation ([facility] ) 10/09/2017    Morbid obesity ([facility] ) 10/09/2017    Gallstones 2017    Diarrhea 2016    Chronic venous hypertension, right 09/15/2016    Onychomycosis 10/27/2015    Diabetes, polyneuropathy ([facility] ) 2014    GERD (gastroesophageal reflux disease) 2014    Hyperlipidemia 2014      LOS (days): 10  Geometric Mean LOS (GMLOS) (days): 3 10  Days to GMLOS:-6 7     OBJECTIVE:  Risk of Unplanned Readmission Score: 36 46         Current admission status: Inpatient   Preferred Pharmacy:   [facility] - [address] [address] [place] [address]  Phone: [phone] Fax: [phone]    Primary Care Provider: Bobby Alvarado MD    Primary Insurance: Amy Otero [facility] REP  Secondary Insurance:     DISCHARGE DETAILS:  Continuing to follow patient  Spoke Phil  of Intake at [facility] at [phone] and was informed patient was declined admission to [facility] as they can not take a patient in bed and do not have a linden lyft  [facility] could offer an outpatient dialysis chair at [facility]  Spoke with [facility] and they can transport patient to [facility] in [facility]  Placed call to Cherrie Closs at [phone] and informed her of the above  Dialysis day and time of tx TBD  CM to follow up

## 2022-09-22 NOTE — ASSESSMENT & PLAN NOTE
· Follows with Dr Dexter Bamberger  · On Velcade and decadron last tx 9/6  · Free light chains ordered outpt f/u with oncology

## 2022-09-22 NOTE — ASSESSMENT & PLAN NOTE
· 2nd episode in one month (tx with 10 days antibx 8/2-8/11)  · CTAP acute diverticulitis  · GI following  · 7/25 Colonoscopy--2 polyps removed, Mild diverticula in the descending colon and sigmoid colon, Small, internal hemorrhoids  · 9/22 finished 10 day course of zosyn  · Questran started 9/17   · Imodium PRN  · Bentyl added 9/18 by GI  · Monitor rectal tube output, continues to have diarrhea but overall improving  · advance diet per gi recommendations  · Gi suspects  multifactorial probably were related to the antibiotics that he is receiving for the diverticulitis as well as a chemo effect from his myeloma  Treatment at this point is supportive

## 2022-09-22 NOTE — ASSESSMENT & PLAN NOTE
Lab Results   Component Value Date    HGBA1C 6 3 (H) 08/05/2022       Recent Labs     09/21/22  1559 09/21/22  2036 09/22/22  0658 09/22/22  0729   POCGLU 148* 151* 111 122       Blood Sugar Average: Last 72 hrs:  (P) 129 1875   · Hold lantus with DORA/hypoglycemia  · SSI

## 2022-09-22 NOTE — PROCEDURES
Central Line    Date/Time: 9/22/2022 9:10 AM  Performed by: Eun Murrieta MD  Authorized by: Eun Murrieta MD     Patient location:  IR  Universal protocol:     Procedure explained and questions answered to patient or proxy's satisfaction: yes      Relevant documents present and verified: yes      Immediately prior to procedure, a time out was called: yes      Patient identity confirmed:  Verbally with patient, arm band and provided demographic data  Pre-procedure details:     Hand hygiene: Hand hygiene performed prior to insertion      Sterile barrier technique: All elements of maximal sterile technique followed      Skin preparation:  2% chlorhexidine    Skin preparation agent: Skin preparation agent completely dried prior to procedure    Sedation:     Sedation type: Moderate (conscious) sedation  Anesthesia (see MAR for exact dosages): Anesthesia method:  Local infiltration    Local anesthetic:  Lidocaine 1% WITH epi  Procedure details:     Location:  Right internal jugular    Vessel type: vein      Approach: percutaneous technique used      Catheter type:  Double lumen    Catheter size:  15 5 Fr    Successful placement: yes      Vessel of catheter tip end:  RA  Post-procedure details:     Post-procedure:  Dressing applied and line sutured    Assessment:  Blood return through all ports and placement verified by x-ray    Post-procedure complications: none      Patient tolerance of procedure:   Tolerated well, no immediate complications  Comments:      Conversion    32cm

## 2022-09-22 NOTE — ASSESSMENT & PLAN NOTE
Wt Readings from Last 3 Encounters:   09/22/22 111 kg (245 lb 2 4 oz)   09/06/22 117 kg (257 lb 15 oz)   08/30/22 118 kg (260 lb)     · EF 45% decreased RV fx  · I/O  · Daily weight  · Currently being managed with HD  · Demadex discontinued due to low bp and minimal urine output

## 2022-09-22 NOTE — PROGRESS NOTES
[place]  Progress Note - Nate Grit 1950, 81 y o  male MRN: [id number]  Unit/Bed#: -Dariusz Encounter: [id number]  Primary Care Provider: Margarette Pacheco MD   Date and time admitted to hospital: 9/12/2022  4:09 PM    * DORA (acute kidney injury) [facility])  Assessment & Plan  · DORA on CKD 3  · Most recent d/c creat 3-3 7 prior in 2 range  · CT no hydro, nonobstructing calculi  · UA neg  · Nephrology following  · 9/15 R tunneled HD cath placed  · 9/15 and 9/16 IHD  · Monitor for renal recovery remains oliguric  · S/p IHD on 9/19   · Resume demadex  · Currently on m/w/f schedule with no signs of renal recovery  · Temporary to PermCath conversion 09/21/2022  · No signs of renal recovery, CM helping set up outpatient HD    Acute diverticulitis  Assessment & Plan  · 2nd episode in one month (tx with 10 days antibx 8/2-8/11)  · CTAP acute diverticulitis  · GI following  · 7/25 Colonoscopy--2 polyps removed, Mild diverticula in the descending colon and sigmoid colon, Small, internal hemorrhoids  · 9/22 finished 10 day course of zosyn  · Questran started 9/17   · Imodium PRN  · Bentyl added 9/18 by GI  · Monitor rectal tube output, continues to have diarrhea but overall improving  · advance diet per gi recommendations  · Gi suspects  multifactorial probably were related to the antibiotics that he is receiving for the diverticulitis as well as a chemo effect from his myeloma  Treatment at this point is supportive      Anemia  Assessment & Plan  · Baseline hgb 8  · Receives IV venofer  · Transfuse for hgb <7  · F/U iron panel    Ambulatory dysfunction  Assessment & Plan  · 2/2 L AKA  · PT/OT    Multiple myeloma ([address] )  Assessment & Plan  · Follows with Dr Seda Sun  · On Velcade and decadron last tx 9/6  · Free light chains ordered outpt f/u with oncology    Diabetes mellitus, type 2 [facility])  Assessment & Plan  Lab Results   Component Value Date    HGBA1C 6 3 (H) 08/05/2022       Recent Labs 09/21/22  1559 09/21/22  2036 09/22/22  0658 09/22/22  0729   POCGLU 148* 151* 111 122       Blood Sugar Average: Last 72 hrs:  (P) 129 1875   · Hold lantus with DORA/hypoglycemia  · SSI    NALLELY (obstructive sleep apnea)  Assessment & Plan  · Refuses cpap    Chronic combined systolic and diastolic congestive heart failure (HCC)  Assessment & Plan  Wt Readings from Last 3 Encounters:   09/22/22 111 kg (245 lb 2 4 oz)   09/06/22 117 kg (257 lb 15 oz)   08/30/22 118 kg (260 lb)     · EF 45% decreased RV fx  · I/O  · Daily weight  · Currently being managed with HD  · Demadex discontinued due to low bp and minimal urine output          Morbid obesity (HCC)  Assessment & Plan  · Dietary education    Chronic atrial fibrillation (HCC)  Assessment & Plan  · 9/18 Bradycardia with Junctional escape beats 20's overnight with hypotension  · No rate control meds d/c in august due to bradycardia  · TSH 0 4  · Coumadin   · INR today 2 2  · Cards consulted recommending CPAP HS and occurs with apnea episodes PT REFUSES             VTE Pharmacologic Prophylaxis: VTE Score: 6 High Risk (Score >/= 5) - Pharmacological DVT Prophylaxis Ordered: warfarin (Coumadin)  Sequential Compression Devices Ordered  Patient Centered Rounds: I performed bedside rounds with nursing staff today  Discussions with Specialists or Other Care Team Provider: gi, nephro    Education and Discussions with Family / Patient: Updated  (son) via phone  Time Spent for Care: 30 minutes  More than 50% of total time spent on counseling and coordination of care as described above  Current Length of Stay: 10 day(s)  Current Patient Status: Inpatient   Certification Statement: The patient will continue to require additional inpatient hospital stay due to diarrhea, working on finding HD seat  Discharge Plan: Anticipate discharge in 24-48 hrs to prior assisted or independent living facility      Code Status: Level 1 - Full Code    Subjective:   Patient seen examined at bedside  No acute events overnight  Patient states he is feeling better states the diarrhea has improved  Objective:     Vitals:   Temp (24hrs), Av 7 °F (37 1 °C), Min:98 °F (36 7 °C), Max:99 8 °F (37 7 °C)    Temp:  [98 °F (36 7 °C)-99 8 °F (37 7 °C)] 98 6 °F (37 °C)  HR:  [41-68] 61  Resp:  [0-28] 17  BP: ()/(46-66) 80/46  SpO2:  [93 %-99 %] 93 %  Body mass index is 30 64 kg/m²  Input and Output Summary (last 24 hours): Intake/Output Summary (Last 24 hours) at 2022 1220  Last data filed at 2022 1839  Gross per 24 hour   Intake 1580 ml   Output 2600 ml   Net -1020 ml       Physical Exam:   Physical Exam  Vitals reviewed  Constitutional:       Appearance: He is obese  HENT:      Head: Normocephalic and atraumatic  Right Ear: External ear normal       Left Ear: External ear normal       Nose: Nose normal       Mouth/Throat:      Mouth: Mucous membranes are moist       Pharynx: Oropharynx is clear  Eyes:      Extraocular Movements: Extraocular movements intact  Cardiovascular:      Rate and Rhythm: Normal rate and regular rhythm  Heart sounds: Normal heart sounds  Pulmonary:      Effort: Pulmonary effort is normal       Breath sounds: Normal breath sounds  Abdominal:      General: Abdomen is flat  Palpations: Abdomen is soft  Tenderness: There is no abdominal tenderness  Musculoskeletal:      Cervical back: Normal range of motion  Right lower leg: No edema  Comments: Left aka   Skin:     General: Skin is warm and dry  Neurological:      Mental Status: He is alert  Mental status is at baseline     Psychiatric:         Mood and Affect: Mood normal          Behavior: Behavior normal           Additional Data:     Labs:  Results from last 7 days   Lab Units 22  0449 22  0518 22  0442 22  0511   WBC Thousand/uL 6 70   < > 4 44 4 12*   HEMOGLOBIN g/dL 8 9*   < > 7 5* 7 0*   HEMATOCRIT % 29 6*   < > 24 0* 22 1*   PLATELETS Thousands/uL 225   < > 254 203   NEUTROS PCT %  --   --   --  54   LYMPHS PCT %  --   --   --  22   LYMPHO PCT %  --   --  0*  --    MONOS PCT %  --   --   --  14*   MONO PCT %  --   --  0*  --    EOS PCT %  --   --  0 8*    < > = values in this interval not displayed  Results from last 7 days   Lab Units 09/22/22  0449 09/19/22  0442 09/18/22  0511   SODIUM mmol/L 138   < > 140   POTASSIUM mmol/L 3 8   < > 3 3*   CHLORIDE mmol/L 101   < > 104   CO2 mmol/L 25   < > 25   BUN mg/dL 14   < > 32*   CREATININE mg/dL 4 31*   < > 4 92*   ANION GAP mmol/L 12   < > 11   CALCIUM mg/dL 8 5   < > 7 6*   ALBUMIN g/dL 2 6*   < > 2 2*   TOTAL BILIRUBIN mg/dL  --   --  0 50   ALK PHOS U/L  --   --  87   ALT U/L  --   --  8*   AST U/L  --   --  14   GLUCOSE RANDOM mg/dL 113   < > 107    < > = values in this interval not displayed  Results from last 7 days   Lab Units 09/22/22  0449   INR  2 23*     Results from last 7 days   Lab Units 09/22/22  1203 09/22/22  0729 09/22/22  0658 09/21/22  2036 09/21/22  1559 09/21/22  1051 09/21/22  0708 09/20/22  2104 09/20/22  1608 09/20/22  1126 09/20/22  0648 09/20/22  0611   POC GLUCOSE mg/dl 148* 122 111 151* 148* 159* 86 118 220* 135 103 108               Lines/Drains:  Invasive Devices  Report    Central Venous Catheter Line  Duration           CVC Central Lines 09/22/22 Double <1 day          Peripheral Intravenous Line  Duration           Peripheral IV 09/20/22 Dorsal (posterior); Right Forearm 2 days          Hemodialysis Catheter  Duration           HD Permanent Double Catheter <1 day          Drain  Duration           Rectal Tube With balloon 6 days                Central Line:  Goal for removal: N/A - Chronic PICC         Telemetry:  Telemetry Orders (From admission, onward)             48 Hour Telemetry Monitoring  Continuous x 48 hours        References:    Telemetry Guidelines   Question:  Reason for 48 Hour Telemetry  Answer:  Arrhythmias Requiring Medical Therapy (eg  SVT, Vtach/fib, Bradycardia, Uncontrolled A-fib)                 Telemetry Reviewed: Normal Sinus Rhythm and Bradycardia overnight  Indication for Continued Telemetry Use: Arrthymias requiring medical therapy             Imaging: Reviewed radiology reports from this admission including: procedure reports    Recent Cultures (last 7 days):         Last 24 Hours Medication List:   Current Facility-Administered Medications   Medication Dose Route Frequency Provider Last Rate    acetaminophen  650 mg Oral Q6H PRN Donnalee Alstrom, PA-C      albuterol  2 puff Inhalation Q6H PRN Donnalee Alstrom, PA-C      allopurinol  300 mg Oral Daily Donnalee Alstrom, PA-C      atorvastatin  40 mg Oral After Senia, PA-C      calcium acetate  667 mg Oral TID With Meals NICOLA Flood      cholestyramine sugar free  4 g Oral BID Donnalee Alstrom, PA-C      clotrimazole   Topical BID Donnalee Alstrom, PA-C      dicyclomine  10 mg Oral 4x Daily (AC & HS) Donchayito Alstrom, PA-C      fentanyl citrate (PF)   Intravenous Code/Trauma/Sedation Med Bernabe Francois MD      fluticasone-vilanterol  1 puff Inhalation Daily Donkojoee Alstrom, PA-C      insulin lispro  1-6 Units Subcutaneous TID With Meals Georgi Alstrom, PA-C      loperamide  2 mg Oral 4x Daily PRN Donnalee Alstrom, PA-C      melatonin  6 mg Oral HS Donnalee Alstrom, PA-C      midazolam    Code/Trauma/Sedation Med Bernabe Francois MD      pantoprazole  40 mg Oral Early Morning Donkojoee Alstrom, PA-C      warfarin  2 mg Oral Daily (warfarin) Mary Pineda DO          Today, Patient Was Seen By: Yi Farris DO    **Please Note: This note may have been constructed using a voice recognition system  **

## 2022-09-22 NOTE — ASSESSMENT & PLAN NOTE
· DORA on CKD 3  · Most recent d/c creat 3-3 7 prior in 2 range  · CT no hydro, nonobstructing calculi  · UA neg  · Nephrology following  · 9/15 R tunneled HD cath placed  · 9/15 and 9/16 IHD  · Monitor for renal recovery remains oliguric  · S/p IHD on 9/19   · Resume demadex  · Currently on m/w/f schedule with no signs of renal recovery  · Temporary to PermCath conversion 09/21/2022  · No signs of renal recovery, CM helping set up outpatient HD

## 2022-09-22 NOTE — QUICK NOTE
9/22/2022 2:39 PM -  Jackelin Cummins's chart and case were reviewed by Denzel Stafford PA-C  Mode of review included electronic chart check and discussion with primary team     Per review, Chinedu's goals are clear for the time being  His symptoms remain controlled on current regimen and no changes are made at this time  Please continue the regimen in place, and review our last note for details  For dispo plan, please review Case Management notes  Palliative will sign off at this time  Patient is appropriate for outpatient follow-up  We will make arrangements through our office  For urgent issues or any questions/concerns, please notify on-call provider via [employer]  You may also call our answering service 24/7 at   Denzel Stafford PA-C  Palliative and Supportive Care  Clinic/Answering Service: [phone]  You can find me on [employer]!

## 2022-09-23 ENCOUNTER — APPOINTMENT (INPATIENT)
Dept: DIALYSIS | Facility: HOSPITAL | Age: 72
DRG: 674 | End: 2022-09-23
Payer: COMMERCIAL

## 2022-09-23 PROBLEM — R11.2 NAUSEA AND VOMITING: Status: ACTIVE | Noted: 2022-01-01

## 2022-09-23 LAB
ALBUMIN SERPL BCP-MCNC: 2.6 G/DL (ref 3.5–5)
ANION GAP SERPL CALCULATED.3IONS-SCNC: 11 MMOL/L (ref 4–13)
BUN SERPL-MCNC: 22 MG/DL (ref 5–25)
CALCIUM ALBUM COR SERPL-MCNC: 9.9 MG/DL (ref 8.3–10.1)
CALCIUM SERPL-MCNC: 8.8 MG/DL (ref 8.3–10.1)
CHLORIDE SERPL-SCNC: 98 MMOL/L (ref 96–108)
CO2 SERPL-SCNC: 27 MMOL/L (ref 21–32)
CREAT SERPL-MCNC: 5.84 MG/DL (ref 0.6–1.3)
ERYTHROCYTE [DISTWIDTH] IN BLOOD BY AUTOMATED COUNT: 18.6 % (ref 11.6–15.1)
FERRITIN SERPL-MCNC: 479 NG/ML (ref 8–388)
GFR SERPL CREATININE-BSD FRML MDRD: 8 ML/MIN/1.73SQ M
GLUCOSE SERPL-MCNC: 119 MG/DL (ref 65–140)
GLUCOSE SERPL-MCNC: 128 MG/DL (ref 65–140)
GLUCOSE SERPL-MCNC: 140 MG/DL (ref 65–140)
GLUCOSE SERPL-MCNC: 142 MG/DL (ref 65–140)
GLUCOSE SERPL-MCNC: 167 MG/DL (ref 65–140)
HCT VFR BLD AUTO: 29.9 % (ref 36.5–49.3)
HCT VFR BLD AUTO: 32.1 % (ref 36.5–49.3)
HGB BLD-MCNC: 10 G/DL (ref 12–17)
HGB BLD-MCNC: 9.3 G/DL (ref 12–17)
INR PPP: 1.86 (ref 0.84–1.19)
IRON SATN MFR SERPL: 10 % (ref 20–50)
IRON SERPL-MCNC: 17 UG/DL (ref 65–175)
MAGNESIUM SERPL-MCNC: 1.6 MG/DL (ref 1.6–2.6)
MCH RBC QN AUTO: 26.9 PG (ref 26.8–34.3)
MCHC RBC AUTO-ENTMCNC: 31.1 G/DL (ref 31.4–37.4)
MCV RBC AUTO: 86 FL (ref 82–98)
PHOSPHATE SERPL-MCNC: 4.4 MG/DL (ref 2.3–4.1)
PLATELET # BLD AUTO: 208 THOUSANDS/UL (ref 149–390)
PMV BLD AUTO: 10.3 FL (ref 8.9–12.7)
POTASSIUM SERPL-SCNC: 3.8 MMOL/L (ref 3.5–5.3)
PROTHROMBIN TIME: 22.5 SECONDS (ref 11.6–14.5)
RBC # BLD AUTO: 3.46 MILLION/UL (ref 3.88–5.62)
SODIUM SERPL-SCNC: 136 MMOL/L (ref 135–147)
TIBC SERPL-MCNC: 174 UG/DL (ref 250–450)
WBC # BLD AUTO: 10.21 THOUSAND/UL (ref 4.31–10.16)

## 2022-09-23 PROCEDURE — 93005 ELECTROCARDIOGRAM TRACING: CPT

## 2022-09-23 PROCEDURE — 85610 PROTHROMBIN TIME: CPT | Performed by: STUDENT IN AN ORGANIZED HEALTH CARE EDUCATION/TRAINING PROGRAM

## 2022-09-23 PROCEDURE — 82728 ASSAY OF FERRITIN: CPT | Performed by: STUDENT IN AN ORGANIZED HEALTH CARE EDUCATION/TRAINING PROGRAM

## 2022-09-23 PROCEDURE — 80069 RENAL FUNCTION PANEL: CPT | Performed by: STUDENT IN AN ORGANIZED HEALTH CARE EDUCATION/TRAINING PROGRAM

## 2022-09-23 PROCEDURE — 85027 COMPLETE CBC AUTOMATED: CPT | Performed by: STUDENT IN AN ORGANIZED HEALTH CARE EDUCATION/TRAINING PROGRAM

## 2022-09-23 PROCEDURE — 82948 REAGENT STRIP/BLOOD GLUCOSE: CPT

## 2022-09-23 PROCEDURE — 83540 ASSAY OF IRON: CPT | Performed by: STUDENT IN AN ORGANIZED HEALTH CARE EDUCATION/TRAINING PROGRAM

## 2022-09-23 PROCEDURE — C9113 INJ PANTOPRAZOLE SODIUM, VIA: HCPCS | Performed by: REGISTERED NURSE

## 2022-09-23 PROCEDURE — 83735 ASSAY OF MAGNESIUM: CPT | Performed by: STUDENT IN AN ORGANIZED HEALTH CARE EDUCATION/TRAINING PROGRAM

## 2022-09-23 PROCEDURE — 85014 HEMATOCRIT: CPT | Performed by: REGISTERED NURSE

## 2022-09-23 PROCEDURE — 99232 SBSQ HOSP IP/OBS MODERATE 35: CPT | Performed by: STUDENT IN AN ORGANIZED HEALTH CARE EDUCATION/TRAINING PROGRAM

## 2022-09-23 PROCEDURE — 90935 HEMODIALYSIS ONE EVALUATION: CPT | Performed by: INTERNAL MEDICINE

## 2022-09-23 PROCEDURE — 99232 SBSQ HOSP IP/OBS MODERATE 35: CPT | Performed by: INTERNAL MEDICINE

## 2022-09-23 PROCEDURE — 83550 IRON BINDING TEST: CPT | Performed by: STUDENT IN AN ORGANIZED HEALTH CARE EDUCATION/TRAINING PROGRAM

## 2022-09-23 PROCEDURE — 85018 HEMOGLOBIN: CPT | Performed by: REGISTERED NURSE

## 2022-09-23 RX ORDER — MIDODRINE HYDROCHLORIDE 5 MG/1
10 TABLET ORAL
Status: DISCONTINUED | OUTPATIENT
Start: 2022-09-23 | End: 2022-10-04 | Stop reason: HOSPADM

## 2022-09-23 RX ORDER — PANTOPRAZOLE SODIUM 40 MG/10ML
40 INJECTION, POWDER, LYOPHILIZED, FOR SOLUTION INTRAVENOUS EVERY 12 HOURS SCHEDULED
Status: DISCONTINUED | OUTPATIENT
Start: 2022-09-23 | End: 2022-09-27

## 2022-09-23 RX ORDER — ALBUMIN (HUMAN) 12.5 G/50ML
12.5 SOLUTION INTRAVENOUS ONCE
Status: COMPLETED | OUTPATIENT
Start: 2022-09-23 | End: 2022-09-23

## 2022-09-23 RX ORDER — ONDANSETRON 2 MG/ML
4 INJECTION INTRAMUSCULAR; INTRAVENOUS EVERY 6 HOURS PRN
Status: DISCONTINUED | OUTPATIENT
Start: 2022-09-23 | End: 2022-10-04 | Stop reason: HOSPADM

## 2022-09-23 RX ORDER — MAGNESIUM SULFATE HEPTAHYDRATE 40 MG/ML
2 INJECTION, SOLUTION INTRAVENOUS ONCE
Status: COMPLETED | OUTPATIENT
Start: 2022-09-23 | End: 2022-09-23

## 2022-09-23 RX ADMIN — PANTOPRAZOLE SODIUM 40 MG: 40 INJECTION, POWDER, FOR SOLUTION INTRAVENOUS at 12:21

## 2022-09-23 RX ADMIN — DICYCLOMINE HYDROCHLORIDE 10 MG: 10 CAPSULE ORAL at 16:50

## 2022-09-23 RX ADMIN — TRIMETHOBENZAMIDE HYDROCHLORIDE 200 MG: 100 INJECTION INTRAMUSCULAR at 10:04

## 2022-09-23 RX ADMIN — ALLOPURINOL 300 MG: 300 TABLET ORAL at 12:21

## 2022-09-23 RX ADMIN — ALBUMIN (HUMAN) 12.5 G: 0.25 INJECTION, SOLUTION INTRAVENOUS at 10:16

## 2022-09-23 RX ADMIN — LOPERAMIDE HYDROCHLORIDE 2 MG: 2 CAPSULE ORAL at 12:21

## 2022-09-23 RX ADMIN — Medication 6 MG: at 21:11

## 2022-09-23 RX ADMIN — MAGNESIUM SULFATE IN WATER 2 G: 40 INJECTION, SOLUTION INTRAVENOUS at 12:22

## 2022-09-23 RX ADMIN — CLOTRIMAZOLE: 0.01 CREAM TOPICAL at 09:00

## 2022-09-23 RX ADMIN — WARFARIN SODIUM 2 MG: 2 TABLET ORAL at 18:57

## 2022-09-23 RX ADMIN — DICYCLOMINE HYDROCHLORIDE 10 MG: 10 CAPSULE ORAL at 12:29

## 2022-09-23 RX ADMIN — PANTOPRAZOLE SODIUM 40 MG: 40 INJECTION, POWDER, FOR SOLUTION INTRAVENOUS at 21:11

## 2022-09-23 RX ADMIN — CHOLESTYRAMINE 4 G: 4 POWDER, FOR SUSPENSION ORAL at 18:57

## 2022-09-23 RX ADMIN — DICYCLOMINE HYDROCHLORIDE 10 MG: 10 CAPSULE ORAL at 21:11

## 2022-09-23 RX ADMIN — PANTOPRAZOLE SODIUM 40 MG: 40 TABLET, DELAYED RELEASE ORAL at 06:11

## 2022-09-23 RX ADMIN — MIDODRINE HYDROCHLORIDE 10 MG: 5 TABLET ORAL at 16:50

## 2022-09-23 RX ADMIN — CALCIUM ACETATE 667 MG: 667 CAPSULE ORAL at 16:50

## 2022-09-23 RX ADMIN — ATORVASTATIN CALCIUM 40 MG: 40 TABLET, FILM COATED ORAL at 18:57

## 2022-09-23 RX ADMIN — FLUTICASONE FUROATE AND VILANTEROL TRIFENATATE 1 PUFF: 200; 25 POWDER RESPIRATORY (INHALATION) at 08:00

## 2022-09-23 RX ADMIN — CALCIUM ACETATE 667 MG: 667 CAPSULE ORAL at 12:21

## 2022-09-23 RX ADMIN — DICYCLOMINE HYDROCHLORIDE 10 MG: 10 CAPSULE ORAL at 06:11

## 2022-09-23 NOTE — ASSESSMENT & PLAN NOTE
· 9/18 Bradycardia with Junctional escape beats 20's overnight with hypotension  · No rate control meds d/c in august due to bradycardia  · TSH 0 4  · Coumadin   · INR today 2 2  · Cards consulted recommending CPAP HS and occurs with apnea episodes

## 2022-09-23 NOTE — PROGRESS NOTES
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 and had a stent placed 2 weeks ago  Initial imaging showed air in the biliary tree that LFTs have been okay  His abdominal pain is better but he continues to have diarrhea  C diff has been negative  Note patient also has multiple myeloma and is receiving chemotherapy      I think the diarrhea is multifactorial probably were related to the antibiotics that he is receiving for the diverticulitis as well as a chemo effect from his myeloma  Treatment at this point is supportive  Correct electrolytes symptomatic treatment he has received Questran  During his colonoscopy that [place] he did not get random biopsies for microscopic colitis but I think this is unlikely also not safe to perform right now given active diverticulitis  I wonder if the chemotherapy could be or should be held for a period of time  Nausea vomiting  New onset of nausea as well as dark emesis and abdominal pain  Discussed with attending  Had EGD earlier this year with large hiatal hernia  Possible Sheridan-Tadeo tear  Clear liquid diet, antiemetics p r n , ppi b i d     Will continue to trend  Chief Complaint   Patient presents with    Abnormal Lab     Patient arrives via EMS from [facility] for an elevated creatinine of 7 3  Reports yellow emesis for a few days  Patient has RUQ and RLQ abdominal pain, is currently getting chemo once a week for kidney cancer         SUBJECTIVE/HPI   Still with soft stool in rectal tube    New onset of nausea    /59   Pulse 64   Temp 99 3 °F (37 4 °C)   Resp 20   Ht 6' 3" (1 905 m)   Wt 116 kg (255 lb 1 2 oz)   SpO2 99%   BMI 31 88 kg/m²     PHYSICALEXAM  General appearance: alert, appears stated age and cooperative  Eyes: PERLLA, EOMI, no icterus   Head: Normocephalic, without obvious abnormality, atraumatic  Lungs: clear to auscultation bilaterally  Heart: regular rate and rhythm, S1, S2 normal, no murmur, click, rub or gallop  Abdomen: soft, non-tender; bowel sounds normal; no masses,  no organomegaly  Extremities: extremities normal, atraumatic, no cyanosis or edema  Neurologic: Grossly normal    Lab Results   Component Value Date    GLUCOSE 64 (L) 08/01/2022    CALCIUM 8 8 09/23/2022     01/15/2018    K 3 8 09/23/2022    CO2 27 09/23/2022    CL 98 09/23/2022    BUN 22 09/23/2022    CREATININE 5 84 (H) 09/23/2022     Lab Results   Component Value Date    WBC 10 21 (H) 09/23/2022    HGB 9 3 (L) 09/23/2022    HCT 29 9 (L) 09/23/2022    MCV 86 09/23/2022     09/23/2022     Lab Results   Component Value Date    ALT 8 (L) 09/18/2022    AST 14 09/18/2022     (H) 11/03/2019    ALKPHOS 87 09/18/2022    BILITOT 0 6 01/15/2018     No results found for: AMYLASE  Lab Results   Component Value Date    LIPASE 691 (H) 09/13/2022     Lab Results   Component Value Date    IRON 17 (L) 09/23/2022    TIBC 174 (L) 09/23/2022    FERRITIN 479 (H) 09/23/2022     Lab Results   Component Value Date    INR 1 86 (H) 09/23/2022

## 2022-09-23 NOTE — ASSESSMENT & PLAN NOTE
· CTAP-common bile duct stent with pneumobilia and air within gallblader  Distended gallbladder with cholelithiasis  · RUQ u/s distended gallbladder with air within stent in CBD similar to CT  · Surgery consulted  If concern for acute cholecystitis not surgical candidate would need per vanessa tube  · GI following  · ERCP 8/29/2022, Dr Nini Cerda, Lizzie Edmond esophagus seen during EGD, biopsy obtained   ERCP performed with endoscopic mucosal resection of abnormal looking ampullary tissue, PD, CBD stent placed    · No s/s acute cholecystitis monitor

## 2022-09-23 NOTE — CASE MANAGEMENT
Case Management Progress Note    Patient name Tanesha Rosario  Location /-03 MRN [id number]  : 1950 Date 2022       LOS (days): 11  Geometric Mean LOS (GMLOS) (days): 3 10  Days to GMLOS:-7 5        OBJECTIVE:        Current admission status: Inpatient  Preferred Pharmacy:   [facility]  [address] [address]  Phone: [phone] Fax: [phone]    Primary Care Provider: Loki Cotto MD    Primary Insurance: Ilda Griffith [facility]  Secondary Insurance:     PROGRESS NOTE: Pt is medically cleared for dc back to [facility] with HD arranged for continued dialysis  Cm spoke with Mary from [place] [facility] Call center  Call center is waiting for [facility] to review and follow up with Call center   Cm to follow up with call center after 12pm

## 2022-09-23 NOTE — PLAN OF CARE
Post-Dialysis RN Treatment Note    Blood Pressure:  Pre 98/49 mm/Hg  Post 114/54 mmHg   EDW  tbd kg    Weight:  Pre 113 kg   Post 111 8 kg   Mode of weight measurement: Bed Scale   Volume Removed  1500 ml    Treatment duration 180 minutes    NS given  No    Treatment shortened? No   Medications given during Rx ALBUMIN 12 5 g   Estimated Kt/V  Not Applicable   Access type: Permacath/TDC   Access Issues: No    Report called to primary nurse   Yes  SHANEKA Rubi

## 2022-09-23 NOTE — ASSESSMENT & PLAN NOTE
Lab Results   Component Value Date    HGBA1C 6 3 (H) 08/05/2022       Recent Labs     09/22/22  1553 09/22/22 2010 09/23/22  0721 09/23/22  1109   POCGLU 144* 175* 119 140       Blood Sugar Average: Last 72 hrs:  (P) 902 9806   · Hold lantus with DORA/hypoglycemia  · SSI INTERVAL HPI/OVERNIGHT EVENTS:  No acute events overnight. Oxygen saturation at 98% on 2L NC.     VITALS:    T(F): 97.1 (21 @ 01:03), Max: 98 (21 @ 17:55)  HR: 57 (21 @ 01:03) (52 - 71)  BP: 112/64 (21 @ 01:03) (107/66 - 150/67)  RR: 15 (21 @ 01:03) (13 - 25)  SpO2: 98% (21 @ 01:03) (87% - 98%)  Wt(kg): --    I&O's Detail    2021 07:01  -  2021 07:00  --------------------------------------------------------  IN:    IV PiggyBack: 300 mL    Oral Fluid: 700 mL    sodium chloride 0.9%: 770 mL  Total IN: 1770 mL    OUT:    Incontinent per Condom Catheter (mL): 420 mL    Voided (mL): 400 mL  Total OUT: 820 mL    Total NET: 950 mL      2021 07:01  -  2021 06:21  --------------------------------------------------------  IN:    IV PiggyBack: 150 mL    sodium chloride 0.9%: 80 mL  Total IN: 230 mL    OUT:    Indwelling Catheter - Urethral (mL): 825 mL  Total OUT: 825 mL    Total NET: -595 mL          MEDICATIONS:    ANTIBIOTICS:  ceFAZolin   IVPB 1000 milliGRAM(s) IV Intermittent every 8 hours      PAIN CONTROL:  acetaminophen   Tablet .. 650 milliGRAM(s) Oral every 6 hours  ketorolac   Injectable 15 milliGRAM(s) IV Push every 8 hours  ondansetron Injectable 4 milliGRAM(s) IV Push every 8 hours PRN       MEDS:      HEME/ONC        PHYSICAL EXAM:  General: No acute distress.  Alert and Oriented  Abdominal Exam: Soft, ND, NT  : 3way lugo in place. 3rd port capped. urine red without any clots       LABS:                        9.4    12.15 )-----------( 234      ( 2021 10:39 )             30.9     06    136  |  103  |  23  ----------------------------<  173<H>  4.1   |  21<L>  |  0.95    Ca    8.4      2021 10:39    TPro  6.6  /  Alb  3.7  /  TBili  0.4  /  DBili  x   /  AST  15  /  ALT  12  /  AlkPhos  77  06-24    PT/INR - ( 2021 14:06 )   PT: 14.4 sec;   INR: 1.21          PTT - ( 2021 14:06 )  PTT:28.8 sec  Urinalysis Basic - ( 2021 06:01 )    Color: Pink / Appearance: SL Cloudy / S.015 / pH: x  Gluc: x / Ketone: NEGATIVE  / Bili: Negative / Urobili: 0.2 E.U./dL   Blood: x / Protein: 30 mg/dL / Nitrite: NEGATIVE   Leuk Esterase: NEGATIVE / RBC: > 10 /HPF / WBC < 5 /HPF   Sq Epi: x / Non Sq Epi: 0-5 /HPF / Bacteria: Present /HPF

## 2022-09-23 NOTE — PROGRESS NOTES
NEPHROLOGY PROGRESS NOTE   Christiano Holt 81 y o  male MRN: [id number]  Unit/Bed#: -01 Encounter: [id number]  Reason for Consult: DORA (POA) on CKD IIIB    68-year-old male with history CKD stage IIIB, combined CHF, atrial fibrillation, morbid obesity, multiple myeloma, who presents to the emergency department from nursing home with nausea, vomiting, diarrhea, and abnormal laboratory values on outpatient blood work  Jeronimo [place] was concern for acute cholecystitis   Nephrology consulted for acute kidney injury  HEMODIALYSIS PROCEDURE NOTE  The patient was seen and examined on hemodialysis  Time: 3 hours  Sodium: 138 Blood flow: 400   Dialyzer: F160 Potassium: KPP Dialysate flow: 1 5 x BFR   Access: perm cath  Bicarbonate: 35 Ultrafiltration goal: 2 5 L      The patient was seen on hemodialysis today  He is complaining of abdominal pain along with vomiting dark colored vomitus  His blood pressures remained stable in the 90s to 100s  His access is working well  He denies chest discomfort or shortness of breath  ASSESSMENT/PLAN:  DORA (POA) on CKD IIIB:  Suspected prerenal due to volume depletion with high GI output with progression to ATN  -baseline creatinine previously in the 2s   -initiated on HD 9/16 due to oliguria, 2nd treatment 9/17 with 3rd treatment on 09/19   -currently following Monday Wednesday Friday schedule for hemodialysis needs  -monitoring daily for renal recovery   Currently no evidence of renal recovery   -case management assisting with outpatient HD unit placement  -CT scan negative for hydro  -UA negative   -strict I/O   Patient has been incontinent   -estimated dry weight to be determined    Last post HD weight 109 6 kg      Access:  Temporary to PermCath conversion 09/21/2022      Hypotension:   overall acceptable with periods of hypotension   -current medication: Torsemide discontinued as patient has been hypotensive with little urine output   -may give albumin with HD to assist with volume removal if becomes hypotensive  Consider addition of midodrine    -recommend avoiding hypotension or high fluctuations in blood pressure   -recommend maintaining map greater than 65 mmHg       Volume status/combined CHF:  With EF of 45%  -torsemide discontinued due to hypotension and with little urine output   -monitoring daily weights, fluid restriction, strict I/O   -will attempt UF with HD as BP tolerates      Electrolytes:  -previous acidosis, currently resolved   -previous hypokalemia, suspect due to diarrhea and acidosis   Will continue to monitor and trend on HD      Anemia:  With history of multiple myeloma   Following with Hematology as outpatient  -on Velcade and Decadron for treatment of multiple myeloma, last treatment on 09/06/2022   -avoiding Epogen   -check iron panel   -continue to monitor and transfuse as needed for hemoglobin less than 7 0      MBD in CKD:  -magnesium level normal, phosphorus elevated to 5 4 but improved from previous level   -recommend renal diet   -continues on calcium acetate with meals  -repeat phos level improved to 4 4      Acute diverticulitis:  Currently with rectal tube and on Bentyl, Imodium, and Questran   GI team following   -C diff negative      Other:  Atrial fibrillation, morbid obesity, COPD, gout, diabetes, hyperlipidemia        Disposition:  Okay to discharge from renal once OP HD unit is arranged  SUBJECTIVE:  The patient was seen on dialysis today  Please see note above      OBJECTIVE:  Current Weight: Weight - Scale: 116 kg (255 lb 1 2 oz)  Vitals:    09/22/22 2147 09/22/22 2344 09/23/22 0501 09/23/22 0722   BP: (!) 105/48 (!) 85/40  114/66   Pulse: 65 60  68   Resp:    20   Temp: 100 4 °F (38 °C) 99 2 °F (37 3 °C)  99 3 °F (37 4 °C)   TempSrc:       SpO2: 98% 100%  98%   Weight:   116 kg (255 lb 1 2 oz)    Height:           Intake/Output Summary (Last 24 hours) at 9/23/2022 5814  Last data filed at 9/23/2022 0501  Gross per 24 hour Intake 720 ml   Output --   Net 720 ml     General: NAD  Skin: warm, dry, intact, no rash  HEENT: Moist mucous membranes, sclera anicteric, normocephalic, atraumatic  Neck: No apparent JVD appreciated  Chest: lung sounds clear B/L, on RA, perm cath  CVS:Regular rate and rhythm, no murmer   Abdomen: Soft, round, non-tender, +BS, obese, rectal tube  Extremities: LE amputation   Neuro: alert and oriented  Psych: appropriate mood and affect     Medications:    Current Facility-Administered Medications:     acetaminophen (TYLENOL) tablet 650 mg, 650 mg, Oral, Q6H PRN, Brianna Barreto PA-C    albuterol (PROVENTIL HFA,VENTOLIN HFA) inhaler 2 puff, 2 puff, Inhalation, Q6H PRN, Brianna Barreto PA-C    allopurinol (ZYLOPRIM) tablet 300 mg, 300 mg, Oral, Daily, Lolis Foote PA-C, 300 mg at 09/21/22 0905    atorvastatin (LIPITOR) tablet 40 mg, 40 mg, Oral, After Dinner, Brianna Barreto PA-C, 40 mg at 09/22/22 1845    calcium acetate (PHOSLO) capsule 667 mg, 667 mg, Oral, TID With Meals, NICOLA Iniguez, 667 mg at 09/22/22 1845    cholestyramine sugar free (QUESTRAN LIGHT) packet 4 g, 4 g, Oral, BID, Lolis Foote PA-C, 4 g at 09/21/22 1718    clotrimazole (LOTRIMIN) 1 % cream, , Topical, BID, Brianna Barreto PA-C, Given at 09/22/22 1846    dicyclomine (BENTYL) capsule 10 mg, 10 mg, Oral, 4x Daily (AC & HS), Lolis Foote PA-C, 10 mg at 09/23/22 9982    fentanyl citrate (PF) 100 MCG/2ML, , Intravenous, Code/Trauma/Sedation Med, Christopher Humphrey MD, 25 mcg at 09/22/22 0853    fluticasone-vilanterol (BREO ELLIPTA) 200-25 MCG/INH inhaler 1 puff, 1 puff, Inhalation, Daily, Lolis Foote PA-C, 1 puff at 09/22/22 1009    insulin lispro (HumaLOG) 100 units/mL subcutaneous injection 1-6 Units, 1-6 Units, Subcutaneous, TID With Meals, 1 Units at 09/21/22 1121 **AND** Fingerstick Glucose (POCT), , , 4x Daily AC and at bedtime, Brianna Barreto PA-C    loperamide (IMODIUM) capsule 2 mg, 2 mg, Oral, 4x Daily PRN, Brianna Barreto PA-C, 2 mg at 09/20/22 1453    magnesium sulfate 2 g/50 mL IVPB (premix) 2 g, 2 g, Intravenous, Once, Mary Pineda DO    melatonin tablet 6 mg, 6 mg, Oral, HS, Lolis Foote PA-C, 6 mg at 09/22/22 2113    midazolam (VERSED) injection, , , Code/Trauma/Sedation Med, Aníbal Llamas MD, 0 5 mg at 09/22/22 0853    pantoprazole (PROTONIX) EC tablet 40 mg, 40 mg, Oral, Early Morning, Lolis Foote PA-C, 40 mg at 09/23/22 2703    warfarin (COUMADIN) tablet 2 mg, 2 mg, Oral, Daily (warfarin), Mary Pineda DO, 2 mg at 09/22/22 1845    Laboratory Results:  Results from last 7 days   Lab Units 09/23/22  0407 09/22/22  0449 09/21/22  0518 09/19/22  0442 09/18/22  0511   WBC Thousand/uL 10 21* 6 70 4 93   < > 4 12*   HEMOGLOBIN g/dL 9 3* 8 9* 8 1*   < > 7 0*   HEMATOCRIT % 29 9* 29 6* 26 8*   < > 22 1*   PLATELETS Thousands/uL 208 225 225   < > 203   SODIUM mmol/L 136 138 137   < > 140   POTASSIUM mmol/L 3 8 3 8 3 6   < > 3 3*   CHLORIDE mmol/L 98 101 102   < > 104   CO2 mmol/L 27 25 23   < > 25   BUN mg/dL 22 14 24   < > 32*   CREATININE mg/dL 5 84* 4 31* 5 84*   < > 4 92*   CALCIUM mg/dL 8 8 8 5 8 7   < > 7 6*   MAGNESIUM mg/dL 1 6 2 1 1 7  --  2 0   PHOSPHORUS mg/dL 4 4* 3 8 5 4*  --   --    ALK PHOS U/L  --   --   --   --  87   ALT U/L  --   --   --   --  8*   AST U/L  --   --   --   --  14    < > = values in this interval not displayed

## 2022-09-23 NOTE — WOUND OSTOMY CARE
Consult Note - Wound   Speedydrvalent[place] 81 y o  male MRN: [id number]  Unit/Bed#: -01 Encounter: [id number]        History and Present Illness:  Patient is a 80 yo male that was admitted to the hospital for treatment of DORA  Patient has a PMH of cancer, COPD, DM, HTN  Patient is a max assist for turning and repositioning  Patient's bowel managed by interal fecal management system and urine managed by indwelling urinary catheter  On assessment, patient is lying supine with right foot in prevalon boot  Wound Care was consulted for right heel abrasion  Patient has left BKA - stump is intact with no skin loss or openings  Assessment Findings:   1  Partial thickness skin loss R Buttocks: Likely related to friction  Wound bed with pink, blanchable tissue  Yi-wound wound is fragile and intact  Scant serosanguineous drainage noted  Calazime applied  2  Right Heel: appears like a ruptured blister  Blanches  2 areas of viable skin flap noted that cover 40% of wound base  Yi-wound is intact  No drainage noted  Area was cleansed, Dermagran gauze placed and covered with an allevyn foam dressing  No induration, fluctuance, odor, warmth/temperature differences, redness, or purulence noted to the above noted wounds and skin areas assessed  New dressings applied per orders listed below  Patient tolerated well- no s/s of non-verbal pain or discomfort observed during the encounter  Bedside nurse aware of plan of care  See flow sheets for more detailed assessment findings  Orders listed below and wound care will continue to follow, call or tiger text with questions  Skin Care Plan:  1-Cleanse sacro-buttocks with soap and water  Apply Calazime Cream TID and PRN  2-Turn/reposition q2h or when medically stable for pressure re-distribution on skin   3-Elevate heels to offload pressure  4-Moisturize skin daily with skin nourishing cream  5-Ehob cushion in chair when out of bed    6-Right Heel: Cleanse heel and pat dry  Apply Dermagran to wound base and cover with Heel Allevyn Foam Dressing  Apply Hinkle Sepulveda  Change every other day and PRN  Wounds:  Wound 09/22/22 Heel Right (Active)   Wound Image   09/23/22 0800   Wound Description Pink 09/23/22 0800   Yi-wound Assessment Fragile 09/23/22 0800   Wound Length (cm) 1 5 cm 09/23/22 0800   Wound Width (cm) 1 5 cm 09/23/22 0800   Wound Depth (cm) 0 1 cm 09/23/22 0800   Wound Surface Area (cm^2) 2 25 cm^2 09/23/22 0800   Wound Volume (cm^3) 0 225 cm^3 09/23/22 0800   Calculated Wound Volume (cm^3) 0 23 cm^3 09/23/22 0800   Drainage Amount None 09/23/22 0800   Drainage Description Other (Comment) 09/23/22 0800   Non-staged Wound Description Partial thickness 09/23/22 0800   Treatments Elevated;Site care 09/23/22 0800   Dressing Dermagran gauze; Foam, Silicon (eg  Allevyn, etc) 09/23/22 0800   Wound packed? No 09/23/22 0800   Dressing Changed New 09/23/22 0800   Patient Tolerance Tolerated well 09/23/22 0800   Dressing Status Clean;Dry; Intact 09/23/22 0800       Wound 09/23/22 Abrasion(s) Buttocks Right (Active)   Wound Image   09/23/22 0800   Wound Description Intact; Pink 09/23/22 0800   Pressure Injury Stage O 09/23/22 0800   Yi-wound Assessment Fragile 09/23/22 0800   Wound Length (cm) 2 cm 09/23/22 0800   Wound Width (cm) 1 cm 09/23/22 0800   Wound Depth (cm) 0 1 cm 09/23/22 0800   Wound Surface Area (cm^2) 2 cm^2 09/23/22 0800   Wound Volume (cm^3) 0 2 cm^3 09/23/22 0800   Calculated Wound Volume (cm^3) 0 2 cm^3 09/23/22 0800   Drainage Amount Scant 09/23/22 0800   Drainage Description Serosanguineous 09/23/22 0800   Non-staged Wound Description Partial thickness 09/23/22 0800   Treatments Cleansed;Site care 09/23/22 0800   Dressing Protective barrier 09/23/22 0800   Wound packed?  No 09/23/22 0800   Dressing Changed New 09/23/22 0800   Patient Tolerance Tolerated well 09/23/22 0800               oMno Yancey RN, BSN

## 2022-09-23 NOTE — DISCHARGE INSTR - OTHER ORDERS
Skin Care Plan:  1-Cleanse sacro-buttocks with soap and water  Apply Calazime Cream TID and PRN  2-Turn/reposition q2h or when medically stable for pressure re-distribution on skin   3-Elevate heels to offload pressure  4-Moisturize skin daily with skin nourishing cream  5-Ehob cushion in chair when out of bed  6-Right Heel: Cleanse heel and pat dry  Apply Dermagran to wound base and cover with Heel Allevyn Foam Dressing  Apply Hinkle Sepulveda  Change every other day and PRN

## 2022-09-23 NOTE — PLAN OF CARE
Problem: Potential for Falls  Goal: Patient will remain free of falls  Description: INTERVENTIONS:  - Educate patient/family on patient safety including physical limitations  - Instruct patient to call for assistance with activity   - Consult OT/PT to assist with strengthening/mobility   - Keep Call bell within reach  - Keep bed low and locked with side rails adjusted as appropriate  - Keep care items and personal belongings within reach  - Initiate and maintain comfort rounds  - Make Fall Risk Sign visible to staff  - Offer Toileting every 2 Hours, in advance of need  - Initiate/Maintain bed alarm  - Obtain necessary fall risk management equipment:   - Apply yellow socks and bracelet for high fall risk patients  - Consider moving patient to room near nurses station  Outcome: Progressing     Problem: MOBILITY - ADULT  Goal: Maintain or return to baseline ADL function  Description: INTERVENTIONS:  -  Assess patient's ability to carry out ADLs; assess patient's baseline for ADL function and identify physical deficits which impact ability to perform ADLs (bathing, care of mouth/teeth, toileting, grooming, dressing, etc )  - Assess/evaluate cause of self-care deficits   - Assess range of motion  - Assess patient's mobility; develop plan if impaired  - Assess patient's need for assistive devices and provide as appropriate  - Encourage maximum independence but intervene and supervise when necessary  - Involve family in performance of ADLs  - Assess for home care needs following discharge   - Consider OT consult to assist with ADL evaluation and planning for discharge  - Provide patient education as appropriate  Outcome: Progressing  Goal: Maintains/Returns to pre admission functional level  Description: INTERVENTIONS:  - Perform BMAT or MOVE assessment daily    - Set and communicate daily mobility goal to care team and patient/family/caregiver     - Collaborate with rehabilitation services on mobility goals if consulted  - Perform Range of Motion 2 times a day  - Reposition patient every 2 hours  - Out of bed for toileting  - Record patient progress and toleration of activity level   Outcome: Progressing     Problem: Prexisting or High Potential for Compromised Skin Integrity  Goal: Skin integrity is maintained or improved  Description: INTERVENTIONS:  - Identify patients at risk for skin breakdown  - Assess and monitor skin integrity  - Assess and monitor nutrition and hydration status  - Monitor labs   - Assess for incontinence   - Turn and reposition patient  - Assist with mobility/ambulation  - Relieve pressure over bony prominences  - Avoid friction and shearing  - Provide appropriate hygiene as needed including keeping skin clean and dry  - Evaluate need for skin moisturizer/barrier cream  - Collaborate with interdisciplinary team   - Patient/family teaching  - Consider wound care consult   Outcome: Progressing     Problem: Nutrition/Hydration-ADULT  Goal: Nutrient/Hydration intake appropriate for improving, restoring or maintaining nutritional needs  Description: Monitor and assess patient's nutrition/hydration status for malnutrition  Collaborate with interdisciplinary team and initiate plan and interventions as ordered  Monitor patient's weight and dietary intake as ordered or per policy  Utilize nutrition screening tool and intervene as necessary  Determine patient's food preferences and provide high-protein, high-caloric foods as appropriate       INTERVENTIONS:  - Monitor oral intake, urinary output, labs, and treatment plans  - Assess nutrition and hydration status and recommend course of action  - Evaluate amount of meals eaten  - Assist patient with eating if necessary   - Allow adequate time for meals  - Recommend/ encourage appropriate diets, oral nutritional supplements, and vitamin/mineral supplements  - Order, calculate, and assess calorie counts as needed  - Recommend, monitor, and adjust tube feedings and TPN/PPN based on assessed needs  - Assess need for intravenous fluids  - Provide specific nutrition/hydration education as appropriate  - Include patient/family/caregiver in decisions related to nutrition  Outcome: Progressing     Problem: GASTROINTESTINAL - ADULT  Goal: Minimal or absence of nausea and/or vomiting  Description: INTERVENTIONS:  - Administer IV fluids if ordered to ensure adequate hydration  - Maintain NPO status until nausea and vomiting are resolved  - Nasogastric tube if ordered  - Administer ordered antiemetic medications as needed  - Provide nonpharmacologic comfort measures as appropriate  - Advance diet as tolerated, if ordered  - Consider nutrition services referral to assist patient with adequate nutrition and appropriate food choices  Outcome: Progressing  Goal: Maintains or returns to baseline bowel function  Description: INTERVENTIONS:  - Assess bowel function  - Encourage oral fluids to ensure adequate hydration  - Administer IV fluids if ordered to ensure adequate hydration  - Administer ordered medications as needed  - Encourage mobilization and activity  - Consider nutritional services referral to assist patient with adequate nutrition and appropriate food choices  Outcome: Progressing  Goal: Maintains adequate nutritional intake  Description: INTERVENTIONS:  - Monitor percentage of each meal consumed  - Identify factors contributing to decreased intake, treat as appropriate  - Assist with meals as needed  - Monitor I&O, weight, and lab values if indicated  - Obtain nutrition services referral as needed  Outcome: Progressing     Problem: GENITOURINARY - ADULT  Goal: Maintains or returns to baseline urinary function  Description: INTERVENTIONS:  - Assess urinary function  - Encourage oral fluids to ensure adequate hydration if ordered  - Administer IV fluids as ordered to ensure adequate hydration  - Administer ordered medications as needed  - Offer frequent toileting  - Follow urinary retention protocol if ordered  Outcome: Progressing  Goal: Absence of urinary retention  Description: INTERVENTIONS:  - Assess patients ability to void and empty bladder  - Monitor I/O  - Bladder scan as needed  - Discuss with physician/AP medications to alleviate retention as needed  - Discuss catheterization for long term situations as appropriate  Outcome: Progressing  Goal: Urinary catheter remains patent  Description: INTERVENTIONS:  - Assess patency of urinary catheter  - If patient has a chronic mehta, consider changing catheter if non-functioning  - Follow guidelines for intermittent irrigation of non-functioning urinary catheter  Outcome: Progressing     Problem: METABOLIC, FLUID AND ELECTROLYTES - ADULT  Goal: Electrolytes maintained within normal limits  Description: INTERVENTIONS:  - Monitor labs and assess patient for signs and symptoms of electrolyte imbalances  - Administer electrolyte replacement as ordered  - Monitor response to electrolyte replacements, including repeat lab results as appropriate  - Instruct patient on fluid and nutrition as appropriate  Outcome: Progressing  Goal: Fluid balance maintained  Description: INTERVENTIONS:  - Monitor labs   - Monitor I/O and WT  - Instruct patient on fluid and nutrition as appropriate  - Assess for signs & symptoms of volume excess or deficit  Outcome: Progressing     Problem: PAIN - ADULT  Goal: Verbalizes/displays adequate comfort level or baseline comfort level  Description: Interventions:  - Encourage patient to monitor pain and request assistance  - Assess pain using appropriate pain scale  - Administer analgesics based on type and severity of pain and evaluate response  - Implement non-pharmacological measures as appropriate and evaluate response  - Consider cultural and social influences on pain and pain management  - Notify physician/advanced practitioner if interventions unsuccessful or patient reports new pain  Outcome: Progressing     Problem: INFECTION - ADULT  Goal: Absence or prevention of progression during hospitalization  Description: INTERVENTIONS:  - Assess and monitor for signs and symptoms of infection  - Monitor lab/diagnostic results  - Monitor all insertion sites, i e  indwelling lines, tubes, and drains  - Monitor endotracheal if appropriate and nasal secretions for changes in amount and color  - [place] appropriate cooling/warming therapies per order  - Administer medications as ordered  - Instruct and encourage patient and family to use good hand hygiene technique  - Identify and instruct in appropriate isolation precautions for identified infection/condition  Outcome: Progressing  Goal: Absence of fever/infection during neutropenic period  Description: INTERVENTIONS:  - Monitor WBC    Outcome: Progressing     Problem: SAFETY ADULT  Goal: Patient will remain free of falls  Description: INTERVENTIONS:  - Educate patient/family on patient safety including physical limitations  - Instruct patient to call for assistance with activity   - Consult OT/PT to assist with strengthening/mobility   - Keep Call bell within reach  - Keep bed low and locked with side rails adjusted as appropriate  - Keep care items and personal belongings within reach  - Initiate and maintain comfort rounds  - Make Fall Risk Sign visible to staff  - Offer Toileting every 2 Hours, in advance of need  - Initiate/Maintain bed alarm  - Obtain necessary fall risk management equipment:   - Apply yellow socks and bracelet for high fall risk patients  - Consider moving patient to room near nurses station  Outcome: Progressing  Goal: Maintain or return to baseline ADL function  Description: INTERVENTIONS:  -  Assess patient's ability to carry out ADLs; assess patient's baseline for ADL function and identify physical deficits which impact ability to perform ADLs (bathing, care of mouth/teeth, toileting, grooming, dressing, etc )  - Assess/evaluate cause of self-care deficits   - Assess range of motion  - Assess patient's mobility; develop plan if impaired  - Assess patient's need for assistive devices and provide as appropriate  - Encourage maximum independence but intervene and supervise when necessary  - Involve family in performance of ADLs  - Assess for home care needs following discharge   - Consider OT consult to assist with ADL evaluation and planning for discharge  - Provide patient education as appropriate  Outcome: Progressing  Goal: Maintains/Returns to pre admission functional level  Description: INTERVENTIONS:  - Perform BMAT or MOVE assessment daily    - Set and communicate daily mobility goal to care team and patient/family/caregiver  - Collaborate with rehabilitation services on mobility goals if consulted  - Perform Range of Motion 2 times a day  - Reposition patient every 2 hours  - Out of bed for toileting  - Record patient progress and toleration of activity level   Outcome: Progressing     Problem: DISCHARGE PLANNING  Goal: Discharge to home or other facility with appropriate resources  Description: INTERVENTIONS:  - Identify barriers to discharge w/patient and caregiver  - Arrange for needed discharge resources and transportation as appropriate  - Identify discharge learning needs (meds, wound care, etc )  - Arrange for interpretive services to assist at discharge as needed  - Refer to Case Management Department for coordinating discharge planning if the patient needs post-hospital services based on physician/advanced practitioner order or complex needs related to functional status, cognitive ability, or social support system  Outcome: Progressing     Problem: Knowledge Deficit  Goal: Patient/family/caregiver demonstrates understanding of disease process, treatment plan, medications, and discharge instructions  Description: Complete learning assessment and assess knowledge base    Interventions:  - Provide teaching at level of understanding  - Provide teaching via preferred learning methods  Outcome: Progressing

## 2022-09-23 NOTE — PROGRESS NOTES
[place]  Progress Note - Delisa Day 1950, 81 y o  male MRN: [id number]  Unit/Bed#: -Dariusz Encounter: [id number]  Primary Care Provider: Yin Robertson MD   Date and time admitted to hospital: 9/12/2022  4:09 PM    * DORA (acute kidney injury) [facility])  Assessment & Plan  · DORA on CKD 3  · Most recent d/c creat 3-3 7 prior in 2 range  · CT no hydro, nonobstructing calculi  · UA neg  · Nephrology following  · 9/15 R tunneled HD cath placed  · 9/15 and 9/16 IHD  · Monitor for renal recovery remains oliguric  · S/p IHD on 9/19   · Resume demadex  · Currently on m/w/f schedule with no signs of renal recovery  · Temporary to PermCath conversion 09/21/2022  · No signs of renal recovery, CM helping set up outpatient HD  · S/P HD today    Acute diverticulitis  Assessment & Plan  · 2nd episode in one month (tx with 10 days antibx 8/2-8/11)  · CTAP acute diverticulitis  · GI following  · 7/25 Colonoscopy--2 polyps removed, Mild diverticula in the descending colon and sigmoid colon, Small, internal hemorrhoids  · 9/22 finished 10 day course of zosyn  · Questran started 9/17   · Imodium PRN  · Bentyl added 9/18 by GI  · Monitor rectal tube output, continues to have diarrhea but overall improving  · advance diet per gi recommendations  · Gi suspects  multifactorial probably were related to the antibiotics that he is receiving for the diverticulitis as well as a chemo effect from his myeloma  Treatment at this point is supportive  Nausea and vomiting  Assessment & Plan  9/23 this morning patient started to develop some nausea and vomiting, vomiting noted to be dark in color  A no doc stools noted in stool  Continue IV PPI b i d  Per GI recommendations  Clear liquid diet  Most likely secondary to large hiatal hernia on previous EGD    Abnormal CT scan  Assessment & Plan  · CTAP-common bile duct stent with pneumobilia and air within gallblader   Distended gallbladder with cholelithiasis  · RUQ u/s distended gallbladder with air within stent in CBD similar to CT  · Surgery consulted  If concern for acute cholecystitis not surgical candidate would need per vanessa tube  · GI following  · ERCP 8/29/2022, Dr Lisa Price, Lillian Guerra esophagus seen during EGD, biopsy obtained   ERCP performed with endoscopic mucosal resection of abnormal looking ampullary tissue, PD, CBD stent placed    · No s/s acute cholecystitis monitor     Anemia  Assessment & Plan  · Baseline hgb 8  · Receives IV venofer  · Transfuse for hgb <7      Ambulatory dysfunction  Assessment & Plan  · 2/2 L AKA  · PT/OT    Multiple myeloma ([facility] [address] )  Assessment & Plan  · Follows with Dr Tod Gowers  · On Velcade and decadron last tx 9/6  · Free light chains ordered outpt f/u with oncology    Diabetes mellitus, type 2 [facility])  Assessment & Plan  Lab Results   Component Value Date    HGBA1C 6 3 (H) 08/05/2022       Recent Labs     09/22/22  1553 09/22/22 2010 09/23/22  0721 09/23/22  1109   POCGLU 144* 175* 119 140       Blood Sugar Average: Last 72 hrs:  (P) 981 0546   · Hold lantus with DORA/hypoglycemia  · SSI    NALLELY (obstructive sleep apnea)  Assessment & Plan  · Refuses cpap    Chronic combined systolic and diastolic congestive heart failure (HCC)  Assessment & Plan  Wt Readings from Last 3 Encounters:   09/23/22 116 kg (255 lb 1 2 oz)   09/06/22 117 kg (257 lb 15 oz)   08/30/22 118 kg (260 lb)     · EF 45% decreased RV fx  · I/O  · Daily weight  · Currently being managed with HD  · Demadex discontinued due to low bp and minimal urine output          Morbid obesity ([facility] [address] )  Assessment & Plan  · Dietary education    [facility]  Assessment & Plan  · 9/18 Bradycardia with Junctional escape beats 20's overnight with hypotension  · No rate control meds d/c in august due to bradycardia  · TSH 0 4  · Coumadin   · INR today 2 2  · Cards consulted recommending CPAP HS and occurs with apnea episodes               VTE Pharmacologic Prophylaxis: VTE Score: 6 High Risk (Score >/= 5) - Pharmacological DVT Prophylaxis Ordered: warfarin (Coumadin)  Sequential Compression Devices Ordered  Patient Centered Rounds: I performed bedside rounds with nursing staff today  Discussions with Specialists or Other Care Team Provider: gi, nephro    Education and Discussions with Family / Patient: Updated  (son) via phone  Time Spent for Care: 30 minutes  More than 50% of total time spent on counseling and coordination of care as described above  Current Length of Stay: 11 day(s)  Current Patient Status: Inpatient   Certification Statement: The patient will continue to require additional inpatient hospital stay due to nausea/vomiting ? hematemasis   Discharge Plan: Anticipate discharge in 48-72 hrs to rehab facility  Code Status: Level 1 - Full Code    Subjective:   Patient seen examined at bedside this morning  Admits to having nausea and vomiting throughout the morning  Also admits to having some abdominal pain  Objective:     Vitals:   Temp (24hrs), Av 7 °F (37 6 °C), Min:99 2 °F (37 3 °C), Max:100 4 °F (38 °C)    Temp:  [99 2 °F (37 3 °C)-100 4 °F (38 °C)] 99 3 °F (37 4 °C)  HR:  [48-73] 73  Resp:  [18-20] 20  BP: ()/(40-66) 80/46  SpO2:  [96 %-100 %] 99 %  Body mass index is 31 88 kg/m²  Input and Output Summary (last 24 hours): Intake/Output Summary (Last 24 hours) at 2022 1201  Last data filed at 2022 0830  Gross per 24 hour   Intake 920 ml   Output --   Net 920 ml       Physical Exam:   Physical Exam  Vitals reviewed  HENT:      Head: Normocephalic and atraumatic  Right Ear: External ear normal       Left Ear: External ear normal       Nose: Nose normal       Mouth/Throat:      Mouth: Mucous membranes are moist       Pharynx: Oropharynx is clear  Eyes:      Extraocular Movements: Extraocular movements intact  Cardiovascular:      Rate and Rhythm: Normal rate  Rhythm irregular  Heart sounds: Normal heart sounds  Pulmonary:      Effort: Pulmonary effort is normal       Breath sounds: Normal breath sounds  Abdominal:      General: Abdomen is flat  Palpations: Abdomen is soft  Tenderness: There is abdominal tenderness  There is no guarding or rebound  Musculoskeletal:      Cervical back: Normal range of motion  Right lower leg: No edema  Comments: Left aka   Skin:     General: Skin is warm and dry  Neurological:      Mental Status: He is alert  Mental status is at baseline  Psychiatric:         Mood and Affect: Mood normal          Behavior: Behavior normal           Additional Data:     Labs:  Results from last 7 days   Lab Units 09/23/22  1132 09/23/22  0407 09/21/22  0518 09/19/22  0442 09/18/22  0511   WBC Thousand/uL  --  10 21*   < > 4 44 4 12*   HEMOGLOBIN g/dL 10 0* 9 3*   < > 7 5* 7 0*   HEMATOCRIT % 32 1* 29 9*   < > 24 0* 22 1*   PLATELETS Thousands/uL  --  208   < > 254 203   NEUTROS PCT %  --   --   --   --  54   LYMPHS PCT %  --   --   --   --  22   LYMPHO PCT %  --   --   --  28  --    MONOS PCT %  --   --   --   --  14*   MONO PCT %  --   --   --  5  --    EOS PCT %  --   --   --  3 8*    < > = values in this interval not displayed  Results from last 7 days   Lab Units 09/23/22  0407 09/19/22  0442 09/18/22  0511   SODIUM mmol/L 136   < > 140   POTASSIUM mmol/L 3 8   < > 3 3*   CHLORIDE mmol/L 98   < > 104   CO2 mmol/L 27   < > 25   BUN mg/dL 22   < > 32*   CREATININE mg/dL 5 84*   < > 4 92*   ANION GAP mmol/L 11   < > 11   CALCIUM mg/dL 8 8   < > 7 6*   ALBUMIN g/dL 2 6*   < > 2 2*   TOTAL BILIRUBIN mg/dL  --   --  0 50   ALK PHOS U/L  --   --  87   ALT U/L  --   --  8*   AST U/L  --   --  14   GLUCOSE RANDOM mg/dL 128   < > 107    < > = values in this interval not displayed       Results from last 7 days   Lab Units 09/23/22  0407   INR  1 86*     Results from last 7 days   Lab Units 09/23/22  1109 09/23/22  0721 09/22/22 2010 09/22/22  1553 09/22/22  1203 09/22/22  0729 09/22/22  0658 09/21/22  2036 09/21/22  1559 09/21/22  1051 09/21/22  0708 09/20/22  2104   POC GLUCOSE mg/dl 140 119 175* 144* 148* 122 111 151* 148* 159* 86 118               Lines/Drains:  Invasive Devices  Report    Central Venous Catheter Line  Duration           CVC Central Lines 09/22/22 Double 1 day          Peripheral Intravenous Line  Duration           Peripheral IV 09/20/22 Dorsal (posterior); Right Forearm 3 days          Hemodialysis Catheter  Duration           HD Permanent Double Catheter 1 day          Drain  Duration           Rectal Tube With balloon 7 days                Central Line:  Goal for removal: N/A - Chronic PICC         Telemetry:  Telemetry Orders (From admission, onward)             48 Hour Telemetry Monitoring  Continuous x 48 hours        Expiring   References:    Telemetry Guidelines   Question:  Reason for 48 Hour Telemetry  Answer:  Arrhythmias Requiring Medical Therapy (eg  SVT, Vtach/fib, Bradycardia, Uncontrolled A-fib)                 Telemetry Reviewed: Atrial fibrillation  HR averaging 70's and short run of nsvt  Indication for Continued Telemetry Use: No indication for continued use  Will discontinue  Imaging: No pertinent imaging reviewed      Recent Cultures (last 7 days):         Last 24 Hours Medication List:   Current Facility-Administered Medications   Medication Dose Route Frequency Provider Last Rate    acetaminophen  650 mg Oral Q6H PRN [facility], PA-C      albuterol  2 puff Inhalation Q6H PRN [facility], PA-C      allopurinol  300 mg Oral Daily [facility], PA-C      atorvastatin  40 mg Oral After Senia PA-C      calcium acetate  667 mg Oral TID With Meals NICOLA Lazar      cholestyramine sugar free  4 g Oral BID [facility], PA-C      clotrimazole   Topical BID [facility], PA-C      dicyclomine  10 mg Oral 4x Daily (AC & HS) [facility], PA-C      fentanyl citrate (PF)   Intravenous Code/Trauma/Sedation Philip Mas MD      fluticasone-vilanterol  1 puff Inhalation Daily Gavino Cabrera PA-C      insulin lispro  1-6 Units Subcutaneous TID With Meals Gavino Cabrera PA-C      loperamide  2 mg Oral 4x Daily PRN Gavino Cabrera PA-C      magnesium sulfate  2 g Intravenous Once Mary Pineda DO      melatonin  6 mg Oral HS Gavino Cabrera PA-C      midazolam    Code/Trauma/Sedation Philip Mas MD      pantoprazole  40 mg Intravenous Q12H [address] NICOLA Rae      trimethobenzamide  200 mg Intramuscular Q6H PRN Mary Pineda DO      warfarin  2 mg Oral Daily (warfarin) Mary Pineda DO          Today, Patient Was Seen By: Wicho Mccall DO    **Please Note: This note may have been constructed using a voice recognition system  **

## 2022-09-23 NOTE — ASSESSMENT & PLAN NOTE
· DORA on CKD 3  · Most recent d/c creat 3-3 7 prior in 2 range  · CT no hydro, nonobstructing calculi  · UA neg  · Nephrology following  · 9/15 R tunneled HD cath placed  · 9/15 and 9/16 IHD  · Monitor for renal recovery remains oliguric  · S/p IHD on 9/19   · Resume demadex  · Currently on m/w/f schedule with no signs of renal recovery  · Temporary to PermCath conversion 09/21/2022  · No signs of renal recovery, CM helping set up outpatient HD  · S/P HD today

## 2022-09-23 NOTE — PLAN OF CARE
Problem: Potential for Falls  Goal: Patient will remain free of falls  Description: INTERVENTIONS:  - Educate patient/family on patient safety including physical limitations  - Instruct patient to call for assistance with activity   - Consult OT/PT to assist with strengthening/mobility   - Keep Call bell within reach  - Keep bed low and locked with side rails adjusted as appropriate  - Keep care items and personal belongings within reach  - Initiate and maintain comfort rounds  - Make Fall Risk Sign visible to staff  - Offer Toileting every 2 Hours, in advance of need  - Initiate/Maintain bed alarm  - Obtain necessary fall risk management equipment:   - Apply yellow socks and bracelet for high fall risk patients  - Consider moving patient to room near nurses station  Outcome: Progressing     Problem: Prexisting or High Potential for Compromised Skin Integrity  Goal: Skin integrity is maintained or improved  Description: INTERVENTIONS:  - Identify patients at risk for skin breakdown  - Assess and monitor skin integrity  - Assess and monitor nutrition and hydration status  - Monitor labs   - Assess for incontinence   - Turn and reposition patient  - Assist with mobility/ambulation  - Relieve pressure over bony prominences  - Avoid friction and shearing  - Provide appropriate hygiene as needed including keeping skin clean and dry  - Evaluate need for skin moisturizer/barrier cream  - Collaborate with interdisciplinary team   - Patient/family teaching  - Consider wound care consult   Outcome: Progressing     Problem: Nutrition/Hydration-ADULT  Goal: Nutrient/Hydration intake appropriate for improving, restoring or maintaining nutritional needs  Description: Monitor and assess patient's nutrition/hydration status for malnutrition  Collaborate with interdisciplinary team and initiate plan and interventions as ordered  Monitor patient's weight and dietary intake as ordered or per policy   Utilize nutrition screening tool and intervene as necessary  Determine patient's food preferences and provide high-protein, high-caloric foods as appropriate       INTERVENTIONS:  - Monitor oral intake, urinary output, labs, and treatment plans  - Assess nutrition and hydration status and recommend course of action  - Evaluate amount of meals eaten  - Assist patient with eating if necessary   - Allow adequate time for meals  - Recommend/ encourage appropriate diets, oral nutritional supplements, and vitamin/mineral supplements  - Order, calculate, and assess calorie counts as needed  - Recommend, monitor, and adjust tube feedings and TPN/PPN based on assessed needs  - Assess need for intravenous fluids  - Provide specific nutrition/hydration education as appropriate  - Include patient/family/caregiver in decisions related to nutrition  Outcome: Progressing     Problem: GENITOURINARY - ADULT  Goal: Absence of urinary retention  Description: INTERVENTIONS:  - Assess patients ability to void and empty bladder  - Monitor I/O  - Bladder scan as needed  - Discuss with physician/AP medications to alleviate retention as needed  - Discuss catheterization for long term situations as appropriate  Outcome: Progressing     Problem: INFECTION - ADULT  Goal: Absence or prevention of progression during hospitalization  Description: INTERVENTIONS:  - Assess and monitor for signs and symptoms of infection  - Monitor lab/diagnostic results  - Monitor all insertion sites, i e  indwelling lines, tubes, and drains  - Monitor endotracheal if appropriate and nasal secretions for changes in amount and color  - [place] appropriate cooling/warming therapies per order  - Administer medications as ordered  - Instruct and encourage patient and family to use good hand hygiene technique  - Identify and instruct in appropriate isolation precautions for identified infection/condition  Outcome: Progressing  Goal: Absence of fever/infection during neutropenic period  Description: INTERVENTIONS:  - Monitor WBC    Outcome: Progressing

## 2022-09-23 NOTE — ASSESSMENT & PLAN NOTE
9/23 this morning patient started to develop some nausea and vomiting, vomiting noted to be dark in color  A no doc stools noted in stool  Continue IV PPI b i d   Per GI recommendations  Clear liquid diet  Most likely secondary to large hiatal hernia on previous EGD

## 2022-09-23 NOTE — ASSESSMENT & PLAN NOTE
· Follows with Dr Andi León  · On Velcade and decadron last tx 9/6  · Free light chains ordered outpt f/u with oncology

## 2022-09-23 NOTE — ASSESSMENT & PLAN NOTE
Wt Readings from Last 3 Encounters:   09/23/22 116 kg (255 lb 1 2 oz)   09/06/22 117 kg (257 lb 15 oz)   08/30/22 118 kg (260 lb)     · EF 45% decreased RV fx  · I/O  · Daily weight  · Currently being managed with HD  · Demadex discontinued due to low bp and minimal urine output

## 2022-09-24 ENCOUNTER — APPOINTMENT (INPATIENT)
Dept: CT IMAGING | Facility: HOSPITAL | Age: 72
DRG: 674 | End: 2022-09-24
Payer: COMMERCIAL

## 2022-09-24 LAB
ALBUMIN SERPL BCP-MCNC: 2.3 G/DL (ref 3.5–5)
ANION GAP SERPL CALCULATED.3IONS-SCNC: 10 MMOL/L (ref 4–13)
BUN SERPL-MCNC: 21 MG/DL (ref 5–25)
CALCIUM ALBUM COR SERPL-MCNC: 10.2 MG/DL (ref 8.3–10.1)
CALCIUM SERPL-MCNC: 8.8 MG/DL (ref 8.3–10.1)
CHLORIDE SERPL-SCNC: 98 MMOL/L (ref 96–108)
CO2 SERPL-SCNC: 26 MMOL/L (ref 21–32)
CREAT SERPL-MCNC: 4.92 MG/DL (ref 0.6–1.3)
ERYTHROCYTE [DISTWIDTH] IN BLOOD BY AUTOMATED COUNT: 18.3 % (ref 11.6–15.1)
GFR SERPL CREATININE-BSD FRML MDRD: 11 ML/MIN/1.73SQ M
GLUCOSE SERPL-MCNC: 135 MG/DL (ref 65–140)
GLUCOSE SERPL-MCNC: 140 MG/DL (ref 65–140)
GLUCOSE SERPL-MCNC: 160 MG/DL (ref 65–140)
GLUCOSE SERPL-MCNC: 165 MG/DL (ref 65–140)
GLUCOSE SERPL-MCNC: 229 MG/DL (ref 65–140)
HCT VFR BLD AUTO: 28.1 % (ref 36.5–49.3)
HGB BLD-MCNC: 8.5 G/DL (ref 12–17)
INR PPP: 2.03 (ref 0.84–1.19)
MAGNESIUM SERPL-MCNC: 2.1 MG/DL (ref 1.6–2.6)
MCH RBC QN AUTO: 26.8 PG (ref 26.8–34.3)
MCHC RBC AUTO-ENTMCNC: 30.2 G/DL (ref 31.4–37.4)
MCV RBC AUTO: 89 FL (ref 82–98)
PHOSPHATE SERPL-MCNC: 3.4 MG/DL (ref 2.3–4.1)
PLATELET # BLD AUTO: 163 THOUSANDS/UL (ref 149–390)
PMV BLD AUTO: 11.1 FL (ref 8.9–12.7)
POTASSIUM SERPL-SCNC: 4.3 MMOL/L (ref 3.5–5.3)
PROCALCITONIN SERPL-MCNC: 6.39 NG/ML
PROTHROMBIN TIME: 24.1 SECONDS (ref 11.6–14.5)
RBC # BLD AUTO: 3.17 MILLION/UL (ref 3.88–5.62)
SODIUM SERPL-SCNC: 134 MMOL/L (ref 135–147)
WBC # BLD AUTO: 11.63 THOUSAND/UL (ref 4.31–10.16)

## 2022-09-24 PROCEDURE — G1004 CDSM NDSC: HCPCS

## 2022-09-24 PROCEDURE — 99232 SBSQ HOSP IP/OBS MODERATE 35: CPT | Performed by: STUDENT IN AN ORGANIZED HEALTH CARE EDUCATION/TRAINING PROGRAM

## 2022-09-24 PROCEDURE — 82948 REAGENT STRIP/BLOOD GLUCOSE: CPT

## 2022-09-24 PROCEDURE — 84145 PROCALCITONIN (PCT): CPT | Performed by: STUDENT IN AN ORGANIZED HEALTH CARE EDUCATION/TRAINING PROGRAM

## 2022-09-24 PROCEDURE — C9113 INJ PANTOPRAZOLE SODIUM, VIA: HCPCS | Performed by: REGISTERED NURSE

## 2022-09-24 PROCEDURE — 85610 PROTHROMBIN TIME: CPT | Performed by: STUDENT IN AN ORGANIZED HEALTH CARE EDUCATION/TRAINING PROGRAM

## 2022-09-24 PROCEDURE — 74176 CT ABD & PELVIS W/O CONTRAST: CPT

## 2022-09-24 PROCEDURE — 85027 COMPLETE CBC AUTOMATED: CPT | Performed by: STUDENT IN AN ORGANIZED HEALTH CARE EDUCATION/TRAINING PROGRAM

## 2022-09-24 PROCEDURE — 87493 C DIFF AMPLIFIED PROBE: CPT | Performed by: STUDENT IN AN ORGANIZED HEALTH CARE EDUCATION/TRAINING PROGRAM

## 2022-09-24 PROCEDURE — 99231 SBSQ HOSP IP/OBS SF/LOW 25: CPT | Performed by: INTERNAL MEDICINE

## 2022-09-24 PROCEDURE — 80069 RENAL FUNCTION PANEL: CPT | Performed by: STUDENT IN AN ORGANIZED HEALTH CARE EDUCATION/TRAINING PROGRAM

## 2022-09-24 PROCEDURE — 83735 ASSAY OF MAGNESIUM: CPT | Performed by: STUDENT IN AN ORGANIZED HEALTH CARE EDUCATION/TRAINING PROGRAM

## 2022-09-24 RX ORDER — VANCOMYCIN HYDROCHLORIDE 125 MG/1
125 CAPSULE ORAL 4 TIMES DAILY
Status: DISCONTINUED | OUTPATIENT
Start: 2022-09-24 | End: 2022-09-26

## 2022-09-24 RX ORDER — PHYTONADIONE 5 MG/1
5 TABLET ORAL ONCE
Status: COMPLETED | OUTPATIENT
Start: 2022-09-24 | End: 2022-09-24

## 2022-09-24 RX ADMIN — DICYCLOMINE HYDROCHLORIDE 10 MG: 10 CAPSULE ORAL at 05:51

## 2022-09-24 RX ADMIN — PHYTONADIONE 5 MG: 5 TABLET ORAL at 16:46

## 2022-09-24 RX ADMIN — MIDODRINE HYDROCHLORIDE 10 MG: 5 TABLET ORAL at 05:52

## 2022-09-24 RX ADMIN — CLOTRIMAZOLE: 0.01 CREAM TOPICAL at 17:10

## 2022-09-24 RX ADMIN — PANTOPRAZOLE SODIUM 40 MG: 40 INJECTION, POWDER, FOR SOLUTION INTRAVENOUS at 08:29

## 2022-09-24 RX ADMIN — ATORVASTATIN CALCIUM 40 MG: 40 TABLET, FILM COATED ORAL at 17:10

## 2022-09-24 RX ADMIN — INSULIN LISPRO 2 UNITS: 100 INJECTION, SOLUTION INTRAVENOUS; SUBCUTANEOUS at 11:55

## 2022-09-24 RX ADMIN — CLOTRIMAZOLE: 0.01 CREAM TOPICAL at 08:30

## 2022-09-24 RX ADMIN — MIDODRINE HYDROCHLORIDE 10 MG: 5 TABLET ORAL at 16:46

## 2022-09-24 RX ADMIN — PANTOPRAZOLE SODIUM 40 MG: 40 INJECTION, POWDER, FOR SOLUTION INTRAVENOUS at 20:14

## 2022-09-24 RX ADMIN — VANCOMYCIN HYDROCHLORIDE 125 MG: 125 CAPSULE ORAL at 17:10

## 2022-09-24 RX ADMIN — Medication 6 MG: at 21:38

## 2022-09-24 RX ADMIN — PIPERACILLIN AND TAZOBACTAM 2.25 G: 2; .25 INJECTION, POWDER, LYOPHILIZED, FOR SOLUTION INTRAVENOUS at 22:29

## 2022-09-24 RX ADMIN — CHOLESTYRAMINE 4 G: 4 POWDER, FOR SUSPENSION ORAL at 08:30

## 2022-09-24 RX ADMIN — VANCOMYCIN HYDROCHLORIDE 125 MG: 125 CAPSULE ORAL at 21:40

## 2022-09-24 RX ADMIN — DICYCLOMINE HYDROCHLORIDE 10 MG: 10 CAPSULE ORAL at 16:46

## 2022-09-24 RX ADMIN — INSULIN LISPRO 1 UNITS: 100 INJECTION, SOLUTION INTRAVENOUS; SUBCUTANEOUS at 16:47

## 2022-09-24 RX ADMIN — CALCIUM ACETATE 667 MG: 667 CAPSULE ORAL at 16:46

## 2022-09-24 RX ADMIN — CALCIUM ACETATE 667 MG: 667 CAPSULE ORAL at 08:29

## 2022-09-24 RX ADMIN — ALLOPURINOL 300 MG: 300 TABLET ORAL at 08:29

## 2022-09-24 RX ADMIN — VANCOMYCIN HYDROCHLORIDE 125 MG: 125 CAPSULE ORAL at 11:55

## 2022-09-24 RX ADMIN — PIPERACILLIN AND TAZOBACTAM 2.25 G: 2; .25 INJECTION, POWDER, LYOPHILIZED, FOR SOLUTION INTRAVENOUS at 16:46

## 2022-09-24 RX ADMIN — DICYCLOMINE HYDROCHLORIDE 10 MG: 10 CAPSULE ORAL at 11:55

## 2022-09-24 RX ADMIN — CALCIUM ACETATE 667 MG: 667 CAPSULE ORAL at 11:55

## 2022-09-24 RX ADMIN — IOHEXOL 50 ML: 240 INJECTION, SOLUTION INTRATHECAL; INTRAVASCULAR; INTRAVENOUS; ORAL at 21:56

## 2022-09-24 RX ADMIN — MIDODRINE HYDROCHLORIDE 10 MG: 5 TABLET ORAL at 11:55

## 2022-09-24 RX ADMIN — DICYCLOMINE HYDROCHLORIDE 10 MG: 10 CAPSULE ORAL at 21:38

## 2022-09-24 RX ADMIN — FLUTICASONE FUROATE AND VILANTEROL TRIFENATATE 1 PUFF: 200; 25 POWDER RESPIRATORY (INHALATION) at 08:29

## 2022-09-24 RX ADMIN — PIPERACILLIN AND TAZOBACTAM 2.25 G: 2; .25 INJECTION, POWDER, LYOPHILIZED, FOR SOLUTION INTRAVENOUS at 11:03

## 2022-09-24 RX ADMIN — CHOLESTYRAMINE 4 G: 4 POWDER, FOR SUSPENSION ORAL at 17:11

## 2022-09-24 NOTE — QUICK NOTE
Patient finished 10 day course of IV Zosyn on 09/22  Noted to have slight leukocytosis of 10 2 yesterday and 11 6 today  Procalcitonin noted to be elevated 6 39  Will restart zosyn and consulted ID  ID concerned for c  Diff infection will repeat c  Diff and start PO vanco  Will also obtain CT abdomen w/o contrast  Discussed with nephro at this point would avoid contrast     INR 2 0, will d/c coumadin, give 5 of vitamin k in setting of dark stools ?  Sheridan villegas tear

## 2022-09-24 NOTE — PROGRESS NOTES
[place]  Progress Note - Julieth Dumont 1950, 81 y o  male MRN: [id number]  Unit/Bed#: -Dariusz Encounter: [id number]  Primary Care Provider: Joe Christopher MD   Date and time admitted to hospital: 9/12/2022  4:09 PM    * DORA (acute kidney injury) [facility])  Assessment & Plan  · DORA on CKD 3  · Most recent d/c creat 3-3 7 prior in 2 range  · CT no hydro, nonobstructing calculi  · UA neg  · Nephrology following  · 9/15 R tunneled HD cath placed  · 9/15 and 9/16 IHD  · Monitor for renal recovery remains oliguric  · S/p IHD on/ 9/19   · Resume demadex  · Currently on m/w/f schedule with no signs of renal recovery  · Temporary to PermCath conversion 09/21/2022  · No signs of renal recovery,  has set up patient for HD at [place] [id number]  11:15 am for Monday  · S/P HD on 9/23    Acute diverticulitis  Assessment & Plan  · 2nd episode in one month (tx with 10 days antibx 8/2-8/11)  · CTAP acute diverticulitis  · GI following  · 7/25 Colonoscopy--2 polyps removed, Mild diverticula in the descending colon and sigmoid colon, Small, internal hemorrhoids  · 9/22 finished 10 day course of zosyn  · Questran started 9/17   · Imodium PRN  · Bentyl added 9/18 by GI  · advance diet per gi recommendations  · Gi suspects  multifactorial probably were related to the antibiotics that he is receiving for the diverticulitis as well as a chemo effect from his myeloma  May need to hold chemo on discharge for a period of time  Treatment at this point is supportive  · Monitor rectal tube output, Stool appears much darker today    Nausea and vomiting  Assessment & Plan  9/23 this morning patient started to develop some nausea and vomiting, vomiting noted to be dark in color, no dark stools noted in rectal tube  Gi started patient on IV PPI b i d    Clear liquid diet  Most likely secondary to large hiatal hernia on previous EGD  9/24 rectal tube noted to have dark stools  Gi to evaluate patient this afternoon  N/V resolved, continues to have abdominal tenderness  Hg 9 3->10->8  5    Abnormal CT scan  Assessment & Plan  · CTAP-common bile duct stent with pneumobilia and air within gallblader  Distended gallbladder with cholelithiasis  · RUQ u/s distended gallbladder with air within stent in CBD similar to CT  · Surgery consulted  If concern for acute cholecystitis not surgical candidate would need per vanessa tube  · GI following  · ERCP 8/29/2022, 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, biopsy obtained   ERCP performed with endoscopic mucosal resection of abnormal looking ampullary tissue, PD, CBD stent placed    · No s/s acute cholecystitis monitor     Anemia  Assessment & Plan  · Baseline hgb 8  · Receives IV venofer  · Transfuse for hgb <7      Ambulatory dysfunction  Assessment & Plan  · 2/2 L AKA  · PT/OT    Multiple myeloma ([facility] )  Assessment & Plan  · Follows with Dr Kenton Estrella  · On Velcade and decadron last tx 9/6  · Free light chains ordered outpt f/u with oncology    Diabetes mellitus, type 2 [facility])  Assessment & Plan  Lab Results   Component Value Date    HGBA1C 6 3 (H) 08/05/2022       Recent Labs     09/23/22  1109 09/23/22  1637 09/23/22  2037 09/24/22  0727   POCGLU 140 142* 167* 140       Blood Sugar Average: Last 72 hrs:  (P) 139 [id number]   · Hold lantus with DORA/hypoglycemia  · SSI    NALLELY (obstructive sleep apnea)  Assessment & Plan  · Refuses cpap    Chronic combined systolic and diastolic congestive heart failure (HCC)  Assessment & Plan  Wt Readings from Last 3 Encounters:   09/24/22 115 kg (253 lb 15 5 oz)   09/06/22 117 kg (257 lb 15 oz)   08/30/22 118 kg (260 lb)     · EF 45% decreased RV fx  · I/O  · Daily weight  · Currently being managed with HD  · Demadex discontinued due to low bp and minimal urine output          Morbid obesity ([facility] )  Assessment & Plan  · Dietary education    Chronic atrial fibrillation ([facility] )  Assessment & Plan  · 9/18 Bradycardia with Junctional escape beats 20's overnight with hypotension  · No rate control meds d/c in august due to bradycardia  · TSH 0 4  · Coumadin   · INR today pending  · Cards consulted recommending CPAP HS and occurs with apnea episodes             VTE Pharmacologic Prophylaxis: VTE Score: 6 High Risk (Score >/= 5) - Pharmacological DVT Prophylaxis Ordered: warfarin (Coumadin)  Sequential Compression Devices Ordered  Patient Centered Rounds: I performed bedside rounds with nursing staff today  Discussions with Specialists or Other Care Team Provider: Gi    Education and Discussions with Family / Patient: Attempted to update  (son) via phone  Left voicemail  Time Spent for Care: 30 minutes  More than 50% of total time spent on counseling and coordination of care as described above  Current Length of Stay: 12 day(s)  Current Patient Status: Inpatient   Certification Statement: The patient will continue to require additional inpatient hospital stay due to ? Gi bleed  Discharge Plan: Anticipate discharge in 24-48 hrs to rehab facility  Code Status: Level 1 - Full Code    Subjective:   Patient seen examined at bedside  Patient states that his nausea vomiting has resolved but continues to have abdominal pain  Rectal tube noted to have dark stool  Objective:     Vitals:   Temp (24hrs), Av 3 °F (37 4 °C), Min:99 2 °F (37 3 °C), Max:99 4 °F (37 4 °C)    Temp:  [99 2 °F (37 3 °C)-99 4 °F (37 4 °C)] 99 4 °F (37 4 °C)  HR:  [50-73] 50  Resp:  [17] 17  BP: ()/(46-59) 105/52  SpO2:  [97 %-99 %] 98 %  Body mass index is 31 74 kg/m²  Input and Output Summary (last 24 hours): Intake/Output Summary (Last 24 hours) at 2022 0959  Last data filed at 2022 1700  Gross per 24 hour   Intake 780 ml   Output 2700 ml   Net -1920 ml       Physical Exam:   Physical Exam  Vitals reviewed  HENT:      Head: Normocephalic and atraumatic        Right Ear: External ear normal       Left Ear: External ear normal       Nose: Nose normal  Mouth/Throat:      Mouth: Mucous membranes are moist       Pharynx: Oropharynx is clear  Eyes:      Extraocular Movements: Extraocular movements intact  Cardiovascular:      Rate and Rhythm: Normal rate and regular rhythm  Heart sounds: Normal heart sounds  Pulmonary:      Effort: Pulmonary effort is normal       Breath sounds: Normal breath sounds  Abdominal:      General: Abdomen is flat  Palpations: Abdomen is soft  Tenderness: There is abdominal tenderness  There is no guarding or rebound  Musculoskeletal:      Cervical back: Normal range of motion  Right lower leg: No edema  Comments: Left AKA   Skin:     General: Skin is warm and dry  Neurological:      Mental Status: He is alert  Mental status is at baseline  Psychiatric:         Mood and Affect: Mood normal          Behavior: Behavior normal           Additional Data:     Labs:  Results from last 7 days   Lab Units 09/24/22 0446 09/21/22 0518 09/19/22 0442 09/18/22  0511   WBC Thousand/uL 11 63*   < > 4 44 4 12*   HEMOGLOBIN g/dL 8 5*   < > 7 5* 7 0*   HEMATOCRIT % 28 1*   < > 24 0* 22 1*   PLATELETS Thousands/uL 163   < > 254 203   NEUTROS PCT %  --   --   --  54   LYMPHS PCT %  --   --   --  22   LYMPHO PCT %  --   --  28  --    MONOS PCT %  --   --   --  14*   MONO PCT %  --   --  5  --    EOS PCT %  --   --  3 8*    < > = values in this interval not displayed       Results from last 7 days   Lab Units 09/24/22 0446 09/19/22 0442 09/18/22  0511   SODIUM mmol/L 134*   < > 140   POTASSIUM mmol/L 4 3   < > 3 3*   CHLORIDE mmol/L 98   < > 104   CO2 mmol/L 26   < > 25   BUN mg/dL 21   < > 32*   CREATININE mg/dL 4 92*   < > 4 92*   ANION GAP mmol/L 10   < > 11   CALCIUM mg/dL 8 8   < > 7 6*   ALBUMIN g/dL 2 3*   < > 2 2*   TOTAL BILIRUBIN mg/dL  --   --  0 50   ALK PHOS U/L  --   --  87   ALT U/L  --   --  8*   AST U/L  --   --  14   GLUCOSE RANDOM mg/dL 160*   < > 107    < > = values in this interval not displayed  Results from last 7 days   Lab Units 09/23/22  0407   INR  1 86*     Results from last 7 days   Lab Units 09/24/22  0727 09/23/22  2037 09/23/22  1637 09/23/22  1109 09/23/22  0721 09/22/22 2010 09/22/22  1553 09/22/22  1203 09/22/22  0729 09/22/22  0658 09/21/22  2036 09/21/22  1559   POC GLUCOSE mg/dl 140 167* 142* 140 119 175* 144* 148* 122 111 151* 148*         Results from last 7 days   Lab Units 09/24/22  0446   PROCALCITONIN ng/ml 6 39*       Lines/Drains:  Invasive Devices  Report    Central Venous Catheter Line  Duration           CVC Central Lines 09/22/22 Double 2 days          Peripheral Intravenous Line  Duration           Peripheral IV 09/24/22 Dorsal (posterior); Right Wrist <1 day          Hemodialysis Catheter  Duration           HD Permanent Double Catheter 2 days          Drain  Duration           Rectal Tube With balloon 8 days                Central Line:  Goal for removal: N/A - Chronic PICC         Telemetry:  Telemetry Orders (From admission, onward)             48 Hour Telemetry Monitoring  Continuous x 48 hours        References:    Telemetry Guidelines   Question:  Reason for 48 Hour Telemetry  Answer:  Acute Decompensated CHF (continuous diuretic infusion or total diuretic dose > 200 mg daily, associated electrolyte derangement, ionotropic drip, history of ventricular arrhythmia, or new EF <35%)                 Telemetry Reviewed: Noted to have bradycardio and pvc's overnight  Indication for Continued Telemetry Use: No indication for continued use  Will discontinue  Imaging: No pertinent imaging reviewed      Recent Cultures (last 7 days):         Last 24 Hours Medication List:   Current Facility-Administered Medications   Medication Dose Route Frequency Provider Last Rate    acetaminophen  650 mg Oral Q6H PRN Virginia Patiño PA-C      albuterol  2 puff Inhalation Q6H PRN Virginia Patiño PA-C      allopurinol  300 mg Oral Daily Virginia Patiño PA-C      atorvastatin  40 mg Oral After Dinner Inocencia Allen PA-C      calcium acetate  667 mg Oral TID With Meals NCIOLA Martinez      cholestyramine sugar free  4 g Oral BID Inocencia Allen PA-C      clotrimazole   Topical BID Inocencia Allen PA-C      dicyclomine  10 mg Oral 4x Daily (AC & HS) Inocencia Allen PA-C      fentanyl citrate (PF)   Intravenous Code/Trauma/Sedation Med Graeme Hartman MD      fluticasone-vilanterol  1 puff Inhalation Daily Inocencia Allen PA-C      insulin lispro  1-6 Units Subcutaneous TID With Meals Inocencia Allen PA-C      [START ON 9/26/2022] iron sucrose  100 mg Intravenous Once per day on Mon Wed Fri Joseph Sadler MD      loperamide  2 mg Oral 4x Daily PRN Inocencia Allen PA-C      melatonin  6 mg Oral HS Inocencia Allen PA-C      midazolam    Code/Trauma/Sedation Med Graeme Hartman MD      midodrine  10 mg Oral TID [facility] Joseph Sadler MD      ondansetron  4 mg Intravenous Q6H PRN Mary Pineda DO      pantoprazole  40 mg Intravenous Q12H [address] NICOLA Rae      trimethobenzamide  200 mg Intramuscular Q6H PRN Mary Pineda DO      warfarin  2 mg Oral Daily (warfarin) Mary Pineda DO          Today, Patient Was Seen By: Dillan Keane DO    **Please Note: This note may have been constructed using a voice recognition system  **

## 2022-09-24 NOTE — ASSESSMENT & PLAN NOTE
9/23 this morning patient started to develop some nausea and vomiting, vomiting noted to be dark in color, no dark stools noted in rectal tube  Gi started patient on IV PPI b i d    Clear liquid diet  Most likely secondary to large hiatal hernia on previous EGD  9/24 rectal tube noted to have dark stools  Gi to evaluate patient this afternoon  N/V resolved, continues to have abdominal tenderness  Hg 9 3->10->8 5

## 2022-09-24 NOTE — PLAN OF CARE
Problem: MOBILITY - ADULT  Goal: Maintain or return to baseline ADL function  Description: INTERVENTIONS:  -  Assess patient's ability to carry out ADLs; assess patient's baseline for ADL function and identify physical deficits which impact ability to perform ADLs (bathing, care of mouth/teeth, toileting, grooming, dressing, etc )  - Assess/evaluate cause of self-care deficits   - Assess range of motion  - Assess patient's mobility; develop plan if impaired  - Assess patient's need for assistive devices and provide as appropriate  - Encourage maximum independence but intervene and supervise when necessary  - Involve family in performance of ADLs  - Assess for home care needs following discharge   - Consider OT consult to assist with ADL evaluation and planning for discharge  - Provide patient education as appropriate  Outcome: Progressing  Goal: Maintains/Returns to pre admission functional level  Description: INTERVENTIONS:  - Perform BMAT or MOVE assessment daily    - Set and communicate daily mobility goal to care team and patient/family/caregiver  - Collaborate with rehabilitation services on mobility goals if consulted  - Perform Range of Motion 2 times a day  - Reposition patient every 2 hours      - Out of bed for toileting  - Record patient progress and toleration of activity level   Outcome: Progressing

## 2022-09-24 NOTE — ASSESSMENT & PLAN NOTE
· DORA on CKD 3  · Most recent d/c creat 3-3 7 prior in 2 range  · CT no hydro, nonobstructing calculi  · UA neg  · Nephrology following  · 9/15 R tunneled HD cath placed  · 9/15 and 9/16 IHD  · Monitor for renal recovery remains oliguric  · S/p IHD on/ 9/19   · Resume demadex  · Currently on m/w/f schedule with no signs of renal recovery  · Temporary to PermCath conversion 09/21/2022  · No signs of renal recovery, CM has set up patient for HD at [place] [id number]  11:15 am for Monday  · S/P HD on 9/23

## 2022-09-24 NOTE — ASSESSMENT & PLAN NOTE
· 9/18 Bradycardia with Junctional escape beats 20's overnight with hypotension  · No rate control meds d/c in august due to bradycardia  · TSH 0 4  · Coumadin   · INR today pending  · Cards consulted recommending CPAP HS and occurs with apnea episodes

## 2022-09-24 NOTE — PROGRESS NOTES
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 2 weeks ago  Initial imaging showed air in the biliary tree that LFTs have been okay  His abdominal pain is better but he continues to have diarrhea  C diff has been negative  Note patient also has multiple myeloma and is receiving chemotherapy      I think the diarrhea is multifactorial probably were related to the antibiotics that he is receiving for the diverticulitis as well as a chemo effect from his myeloma  Treatment at this point is supportive  Correct electrolytes symptomatic treatment he has received Questran  During his colonoscopy that [place] he did not get random biopsies for microscopic colitis but I think this is unlikely also not safe to perform right now given active diverticulitis  I wonder if the chemotherapy could be or should be held for a period of time  Nausea vomiting  New onset of nausea as well as dark emesis and abdominal pain  Had EGD earlier this year with large hiatal hernia  Possible Sheridan-Tadeo tear  Clear liquid diet, antiemetics p r n , ppi b i d     Will continue to trend  Chief Complaint   Patient presents with    Abnormal Lab     [facility] for an [facility] creatinine of 7 3  Reports yellow emesis for a few days  Patient has RUQ and RLQ abdominal pain, is currently getting chemo once a week for kidney cancer         SUBJECTIVE/HPI   Still with dark brown stool in the rectal bag  Nausea vomiting and abdominal pain is improved today      /52   Pulse (!) 50   Temp 99 4 °F (37 4 °C)   Resp 17   Ht 6' 3" (1 905 m)   Wt 115 kg (253 lb 15 5 oz)   SpO2 98%   BMI 31 74 kg/m²     PHYSICALEXAM  General appearance: alert, appears stated age and cooperative  Eyes: PERLLA, EOMI, no icterus   Head: Normocephalic, without obvious abnormality, atraumatic  Lungs: clear to auscultation bilaterally  Heart: regular rate and rhythm, S1, S2 normal, no murmur, click, rub or gallop  Abdomen: soft, non-tender; bowel sounds normal; no masses,  no organomegaly  Extremities: extremities normal, atraumatic, no cyanosis or edema  Neurologic: Grossly normal    Lab Results   Component Value Date    GLUCOSE 64 (L) 08/01/2022    CALCIUM 8 8 09/24/2022     01/15/2018    K 4 3 09/24/2022    CO2 26 09/24/2022    CL 98 09/24/2022    BUN 21 09/24/2022    CREATININE 4 92 (H) 09/24/2022     Lab Results   Component Value Date    WBC 11 63 (H) 09/24/2022    HGB 8 5 (L) 09/24/2022    HCT 28 1 (L) 09/24/2022    MCV 89 09/24/2022     09/24/2022     Lab Results   Component Value Date    ALT 8 (L) 09/18/2022    AST 14 09/18/2022     (H) 11/03/2019    ALKPHOS 87 09/18/2022    BILITOT 0 6 01/15/2018     No results found for: AMYLASE  Lab Results   Component Value Date    LIPASE 691 (H) 09/13/2022     Lab Results   Component Value Date    IRON 17 (L) 09/23/2022    TIBC 174 (L) 09/23/2022    FERRITIN 479 (H) 09/23/2022     Lab Results   Component Value Date    INR 1 86 (H) 09/23/2022

## 2022-09-24 NOTE — ASSESSMENT & PLAN NOTE
Wt Readings from Last 3 Encounters:   09/24/22 115 kg (253 lb 15 5 oz)   09/06/22 117 kg (257 lb 15 oz)   08/30/22 118 kg (260 lb)     · EF 45% decreased RV fx  · I/O  · Daily weight  · Currently being managed with HD  · Demadex discontinued due to low bp and minimal urine output

## 2022-09-24 NOTE — ASSESSMENT & PLAN NOTE
Lab Results   Component Value Date    HGBA1C 6 3 (H) 08/05/2022       Recent Labs     09/23/22  1109 09/23/22  1637 09/23/22 2037 09/24/22  0727   POCGLU 140 142* 167* 140       Blood Sugar Average: Last 72 hrs:  (P) 139 [id number]   · Hold lantus with DORA/hypoglycemia  · SSI

## 2022-09-24 NOTE — CASE MANAGEMENT
Case Management Progress Note    Patient name Airam Espinosa  Location /-57 MRN [id number]  : 1950 Date 2022       LOS (days): 12  Geometric Mean LOS (GMLOS) (days): 5 90  Days to GMLOS:-5 6        OBJECTIVE:        Current admission status: Inpatient  Preferred Pharmacy:   [facility] [address], [place]  [address] [facility] [address]  Phone: [phone] Fax: [phone]    Primary Care Provider: Jessie Box MD    Primary Insurance: [facility]  Secondary Insurance:     PROGRESS NOTE: Cm notified by [facility] that pts schedule will be M,W,F 11:15 am Ramon Arzola  At this time the  site is planning for pt to start on   His first appointment will be 11am and the 11:15am chair time will follow after the first  Cm informed pts LTC facility [facility] via dean

## 2022-09-24 NOTE — ASSESSMENT & PLAN NOTE
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

## 2022-09-24 NOTE — ASSESSMENT & PLAN NOTE
GI consult for pancreatitis  No evidence of cholangitis  Deranged liver enzyme although no GB stones  Normal TG, Calcium  Denies etoh    MRCP to rule out ductal abnormalities  IGG4  ? SOD  ? Med side effects    IV fluids and pain control    Full consult to follow    Miles Hendricks MD     · Baseline hgb 8  · Receives IV venofer  · Transfuse for hgb <7

## 2022-09-24 NOTE — PLAN OF CARE
Problem: Potential for Falls  Goal: Patient will remain free of falls  Description: INTERVENTIONS:  - Educate patient/family on patient safety including physical limitations  - Instruct patient to call for assistance with activity   - Consult OT/PT to assist with strengthening/mobility   - Keep Call bell within reach  - Keep bed low and locked with side rails adjusted as appropriate  - Keep care items and personal belongings within reach  - Initiate and maintain comfort rounds  - Make Fall Risk Sign visible to staff  - Offer Toileting every 2 Hours, in advance of need  - Initiate/Maintain bed alarm  - Obtain necessary fall risk management equipment:   - Apply yellow socks and bracelet for high fall risk patients  - Consider moving patient to room near nurses station  Outcome: Progressing     Problem: MOBILITY - ADULT  Goal: Maintain or return to baseline ADL function  Description: INTERVENTIONS:  -  Assess patient's ability to carry out ADLs; assess patient's baseline for ADL function and identify physical deficits which impact ability to perform ADLs (bathing, care of mouth/teeth, toileting, grooming, dressing, etc )  - Assess/evaluate cause of self-care deficits   - Assess range of motion  - Assess patient's mobility; develop plan if impaired  - Assess patient's need for assistive devices and provide as appropriate  - Encourage maximum independence but intervene and supervise when necessary  - Involve family in performance of ADLs  - Assess for home care needs following discharge   - Consider OT consult to assist with ADL evaluation and planning for discharge  - Provide patient education as appropriate  Outcome: Progressing  Goal: Maintains/Returns to pre admission functional level  Description: INTERVENTIONS:  - Perform BMAT or MOVE assessment daily    - Set and communicate daily mobility goal to care team and patient/family/caregiver     - Collaborate with rehabilitation services on mobility goals if consulted  - Perform Range of Motion 2 times a day  - Reposition patient every 2 hours  - Out of bed for toileting  - Record patient progress and toleration of activity level   Outcome: Progressing     Problem: Prexisting or High Potential for Compromised Skin Integrity  Goal: Skin integrity is maintained or improved  Description: INTERVENTIONS:  - Identify patients at risk for skin breakdown  - Assess and monitor skin integrity  - Assess and monitor nutrition and hydration status  - Monitor labs   - Assess for incontinence   - Turn and reposition patient  - Assist with mobility/ambulation  - Relieve pressure over bony prominences  - Avoid friction and shearing  - Provide appropriate hygiene as needed including keeping skin clean and dry  - Evaluate need for skin moisturizer/barrier cream  - Collaborate with interdisciplinary team   - Patient/family teaching  - Consider wound care consult   Outcome: Progressing     Problem: Nutrition/Hydration-ADULT  Goal: Nutrient/Hydration intake appropriate for improving, restoring or maintaining nutritional needs  Description: Monitor and assess patient's nutrition/hydration status for malnutrition  Collaborate with interdisciplinary team and initiate plan and interventions as ordered  Monitor patient's weight and dietary intake as ordered or per policy  Utilize nutrition screening tool and intervene as necessary  Determine patient's food preferences and provide high-protein, high-caloric foods as appropriate       INTERVENTIONS:  - Monitor oral intake, urinary output, labs, and treatment plans  - Assess nutrition and hydration status and recommend course of action  - Evaluate amount of meals eaten  - Assist patient with eating if necessary   - Allow adequate time for meals  - Recommend/ encourage appropriate diets, oral nutritional supplements, and vitamin/mineral supplements  - Order, calculate, and assess calorie counts as needed  - Recommend, monitor, and adjust tube feedings and TPN/PPN based on assessed needs  - Assess need for intravenous fluids  - Provide specific nutrition/hydration education as appropriate  - Include patient/family/caregiver in decisions related to nutrition  Outcome: Progressing     Problem: GASTROINTESTINAL - ADULT  Goal: Minimal or absence of nausea and/or vomiting  Description: INTERVENTIONS:  - Administer IV fluids if ordered to ensure adequate hydration  - Maintain NPO status until nausea and vomiting are resolved  - Nasogastric tube if ordered  - Administer ordered antiemetic medications as needed  - Provide nonpharmacologic comfort measures as appropriate  - Advance diet as tolerated, if ordered  - Consider nutrition services referral to assist patient with adequate nutrition and appropriate food choices  Outcome: Progressing  Goal: Maintains or returns to baseline bowel function  Description: INTERVENTIONS:  - Assess bowel function  - Encourage oral fluids to ensure adequate hydration  - Administer IV fluids if ordered to ensure adequate hydration  - Administer ordered medications as needed  - Encourage mobilization and activity  - Consider nutritional services referral to assist patient with adequate nutrition and appropriate food choices  Outcome: Progressing  Goal: Maintains adequate nutritional intake  Description: INTERVENTIONS:  - Monitor percentage of each meal consumed  - Identify factors contributing to decreased intake, treat as appropriate  - Assist with meals as needed  - Monitor I&O, weight, and lab values if indicated  - Obtain nutrition services referral as needed  Outcome: Progressing     Problem: GENITOURINARY - ADULT  Goal: Maintains or returns to baseline urinary function  Description: INTERVENTIONS:  - Assess urinary function  - Encourage oral fluids to ensure adequate hydration if ordered  - Administer IV fluids as ordered to ensure adequate hydration  - Administer ordered medications as needed  - Offer frequent toileting  - Follow urinary retention protocol if ordered  Outcome: Progressing  Goal: Absence of urinary retention  Description: INTERVENTIONS:  - Assess patients ability to void and empty bladder  - Monitor I/O  - Bladder scan as needed  - Discuss with physician/AP medications to alleviate retention as needed  - Discuss catheterization for long term situations as appropriate  Outcome: Progressing  Goal: Urinary catheter remains patent  Description: INTERVENTIONS:  - Assess patency of urinary catheter  - If patient has a chronic mehta, consider changing catheter if non-functioning  - Follow guidelines for intermittent irrigation of non-functioning urinary catheter  Outcome: Progressing     Problem: METABOLIC, FLUID AND ELECTROLYTES - ADULT  Goal: Electrolytes maintained within normal limits  Description: INTERVENTIONS:  - Monitor labs and assess patient for signs and symptoms of electrolyte imbalances  - Administer electrolyte replacement as ordered  - Monitor response to electrolyte replacements, including repeat lab results as appropriate  - Instruct patient on fluid and nutrition as appropriate  Outcome: Progressing  Goal: Fluid balance maintained  Description: INTERVENTIONS:  - Monitor labs   - Monitor I/O and WT  - Instruct patient on fluid and nutrition as appropriate  - Assess for signs & symptoms of volume excess or deficit  Outcome: Progressing

## 2022-09-24 NOTE — ASSESSMENT & PLAN NOTE
· 2nd episode in one month (tx with 10 days antibx 8/2-8/11)  · CTAP acute diverticulitis  · GI following  · 7/25 Colonoscopy--2 polyps removed, Mild diverticula in the descending colon and sigmoid colon, Small, internal hemorrhoids  · 9/22 finished 10 day course of zosyn  · Questran started 9/17   · Imodium PRN  · Bentyl added 9/18 by GI  · advance diet per gi recommendations  · Gi suspects  multifactorial probably were related to the antibiotics that he is receiving for the diverticulitis as well as a chemo effect from his myeloma  May need to hold chemo on discharge for a period of time  Treatment at this point is supportive    · Monitor rectal tube output, Stool appears much darker today

## 2022-09-25 ENCOUNTER — APPOINTMENT (OUTPATIENT)
Dept: ULTRASOUND IMAGING | Facility: HOSPITAL | Age: 72
DRG: 674 | End: 2022-09-25
Payer: COMMERCIAL

## 2022-09-25 ENCOUNTER — APPOINTMENT (OUTPATIENT)
Dept: RADIOLOGY | Facility: HOSPITAL | Age: 72
DRG: 674 | End: 2022-09-25
Payer: COMMERCIAL

## 2022-09-25 LAB
ALBUMIN SERPL BCP-MCNC: 2.3 G/DL (ref 3.5–5)
ANION GAP SERPL CALCULATED.3IONS-SCNC: 11 MMOL/L (ref 4–13)
BASOPHILS # BLD AUTO: 0.06 THOUSANDS/ΜL (ref 0–0.1)
BASOPHILS NFR BLD AUTO: 1 % (ref 0–1)
BUN SERPL-MCNC: 31 MG/DL (ref 5–25)
C DIFF TOX GENS STL QL NAA+PROBE: NEGATIVE
CALCIUM ALBUM COR SERPL-MCNC: 10.2 MG/DL (ref 8.3–10.1)
CALCIUM SERPL-MCNC: 8.8 MG/DL (ref 8.3–10.1)
CHLORIDE SERPL-SCNC: 98 MMOL/L (ref 96–108)
CO2 SERPL-SCNC: 28 MMOL/L (ref 21–32)
CREAT SERPL-MCNC: 6.68 MG/DL (ref 0.6–1.3)
EOSINOPHIL # BLD AUTO: 0.09 THOUSAND/ΜL (ref 0–0.61)
EOSINOPHIL NFR BLD AUTO: 1 % (ref 0–6)
ERYTHROCYTE [DISTWIDTH] IN BLOOD BY AUTOMATED COUNT: 17.5 % (ref 11.6–15.1)
ERYTHROCYTE [DISTWIDTH] IN BLOOD BY AUTOMATED COUNT: 18 % (ref 11.6–15.1)
GFR SERPL CREATININE-BSD FRML MDRD: 7 ML/MIN/1.73SQ M
GLUCOSE SERPL-MCNC: 127 MG/DL (ref 65–140)
GLUCOSE SERPL-MCNC: 133 MG/DL (ref 65–140)
GLUCOSE SERPL-MCNC: 147 MG/DL (ref 65–140)
GLUCOSE SERPL-MCNC: 164 MG/DL (ref 65–140)
GLUCOSE SERPL-MCNC: 177 MG/DL (ref 65–140)
HCT VFR BLD AUTO: 27.1 % (ref 36.5–49.3)
HCT VFR BLD AUTO: 27.8 % (ref 36.5–49.3)
HGB BLD-MCNC: 8.3 G/DL (ref 12–17)
HGB BLD-MCNC: 8.5 G/DL (ref 12–17)
IMM GRANULOCYTES # BLD AUTO: 0.06 THOUSAND/UL (ref 0–0.2)
IMM GRANULOCYTES NFR BLD AUTO: 1 % (ref 0–2)
INR PPP: 2.04 (ref 0.84–1.19)
LACTATE SERPL-SCNC: 2.8 MMOL/L (ref 0.5–2)
LYMPHOCYTES # BLD AUTO: 1.08 THOUSANDS/ΜL (ref 0.6–4.47)
LYMPHOCYTES NFR BLD AUTO: 10 % (ref 14–44)
MAGNESIUM SERPL-MCNC: 2.2 MG/DL (ref 1.6–2.6)
MCH RBC QN AUTO: 26.3 PG (ref 26.8–34.3)
MCH RBC QN AUTO: 26.5 PG (ref 26.8–34.3)
MCHC RBC AUTO-ENTMCNC: 30.6 G/DL (ref 31.4–37.4)
MCHC RBC AUTO-ENTMCNC: 30.6 G/DL (ref 31.4–37.4)
MCV RBC AUTO: 86 FL (ref 82–98)
MCV RBC AUTO: 87 FL (ref 82–98)
MONOCYTES # BLD AUTO: 0.92 THOUSAND/ΜL (ref 0.17–1.22)
MONOCYTES NFR BLD AUTO: 9 % (ref 4–12)
NEUTROPHILS # BLD AUTO: 8.48 THOUSANDS/ΜL (ref 1.85–7.62)
NEUTS SEG NFR BLD AUTO: 78 % (ref 43–75)
NRBC BLD AUTO-RTO: 0 /100 WBCS
PHOSPHATE SERPL-MCNC: 4.8 MG/DL (ref 2.3–4.1)
PLATELET # BLD AUTO: 189 THOUSANDS/UL (ref 149–390)
PLATELET # BLD AUTO: 201 THOUSANDS/UL (ref 149–390)
PMV BLD AUTO: 10.5 FL (ref 8.9–12.7)
PMV BLD AUTO: 10.7 FL (ref 8.9–12.7)
POTASSIUM SERPL-SCNC: 3.7 MMOL/L (ref 3.5–5.3)
PROCALCITONIN SERPL-MCNC: 7.79 NG/ML
PROCALCITONIN SERPL-MCNC: 9.34 NG/ML
PROTHROMBIN TIME: 24.2 SECONDS (ref 11.6–14.5)
RBC # BLD AUTO: 3.16 MILLION/UL (ref 3.88–5.62)
RBC # BLD AUTO: 3.21 MILLION/UL (ref 3.88–5.62)
SODIUM SERPL-SCNC: 137 MMOL/L (ref 135–147)
WBC # BLD AUTO: 10.69 THOUSAND/UL (ref 4.31–10.16)
WBC # BLD AUTO: 8.81 THOUSAND/UL (ref 4.31–10.16)

## 2022-09-25 PROCEDURE — 99232 SBSQ HOSP IP/OBS MODERATE 35: CPT | Performed by: STUDENT IN AN ORGANIZED HEALTH CARE EDUCATION/TRAINING PROGRAM

## 2022-09-25 PROCEDURE — 80053 COMPREHEN METABOLIC PANEL: CPT | Performed by: PHYSICIAN ASSISTANT

## 2022-09-25 PROCEDURE — C9113 INJ PANTOPRAZOLE SODIUM, VIA: HCPCS | Performed by: REGISTERED NURSE

## 2022-09-25 PROCEDURE — 99233 SBSQ HOSP IP/OBS HIGH 50: CPT | Performed by: STUDENT IN AN ORGANIZED HEALTH CARE EDUCATION/TRAINING PROGRAM

## 2022-09-25 PROCEDURE — 87040 BLOOD CULTURE FOR BACTERIA: CPT | Performed by: STUDENT IN AN ORGANIZED HEALTH CARE EDUCATION/TRAINING PROGRAM

## 2022-09-25 PROCEDURE — 84145 PROCALCITONIN (PCT): CPT | Performed by: STUDENT IN AN ORGANIZED HEALTH CARE EDUCATION/TRAINING PROGRAM

## 2022-09-25 PROCEDURE — 76705 ECHO EXAM OF ABDOMEN: CPT

## 2022-09-25 PROCEDURE — 82948 REAGENT STRIP/BLOOD GLUCOSE: CPT

## 2022-09-25 PROCEDURE — 83735 ASSAY OF MAGNESIUM: CPT | Performed by: STUDENT IN AN ORGANIZED HEALTH CARE EDUCATION/TRAINING PROGRAM

## 2022-09-25 PROCEDURE — 80069 RENAL FUNCTION PANEL: CPT | Performed by: STUDENT IN AN ORGANIZED HEALTH CARE EDUCATION/TRAINING PROGRAM

## 2022-09-25 PROCEDURE — 71045 X-RAY EXAM CHEST 1 VIEW: CPT

## 2022-09-25 PROCEDURE — 85027 COMPLETE CBC AUTOMATED: CPT | Performed by: STUDENT IN AN ORGANIZED HEALTH CARE EDUCATION/TRAINING PROGRAM

## 2022-09-25 PROCEDURE — 83605 ASSAY OF LACTIC ACID: CPT | Performed by: STUDENT IN AN ORGANIZED HEALTH CARE EDUCATION/TRAINING PROGRAM

## 2022-09-25 PROCEDURE — 99232 SBSQ HOSP IP/OBS MODERATE 35: CPT | Performed by: INTERNAL MEDICINE

## 2022-09-25 PROCEDURE — 85610 PROTHROMBIN TIME: CPT | Performed by: STUDENT IN AN ORGANIZED HEALTH CARE EDUCATION/TRAINING PROGRAM

## 2022-09-25 PROCEDURE — 85025 COMPLETE CBC W/AUTO DIFF WBC: CPT | Performed by: PHYSICIAN ASSISTANT

## 2022-09-25 RX ORDER — SODIUM CHLORIDE, SODIUM GLUCONATE, SODIUM ACETATE, POTASSIUM CHLORIDE, MAGNESIUM CHLORIDE, SODIUM PHOSPHATE, DIBASIC, AND POTASSIUM PHOSPHATE .53; .5; .37; .037; .03; .012; .00082 G/100ML; G/100ML; G/100ML; G/100ML; G/100ML; G/100ML; G/100ML
250 INJECTION, SOLUTION INTRAVENOUS ONCE
Status: COMPLETED | OUTPATIENT
Start: 2022-09-25 | End: 2022-09-25

## 2022-09-25 RX ORDER — PHYTONADIONE 5 MG/1
5 TABLET ORAL ONCE
Status: COMPLETED | OUTPATIENT
Start: 2022-09-25 | End: 2022-09-25

## 2022-09-25 RX ADMIN — PANTOPRAZOLE SODIUM 40 MG: 40 INJECTION, POWDER, FOR SOLUTION INTRAVENOUS at 08:44

## 2022-09-25 RX ADMIN — MIDODRINE HYDROCHLORIDE 10 MG: 5 TABLET ORAL at 12:07

## 2022-09-25 RX ADMIN — CALCIUM ACETATE 667 MG: 667 CAPSULE ORAL at 17:22

## 2022-09-25 RX ADMIN — ATORVASTATIN CALCIUM 40 MG: 40 TABLET, FILM COATED ORAL at 17:22

## 2022-09-25 RX ADMIN — PIPERACILLIN AND TAZOBACTAM 2.25 G: 2; .25 INJECTION, POWDER, LYOPHILIZED, FOR SOLUTION INTRAVENOUS at 04:52

## 2022-09-25 RX ADMIN — INSULIN LISPRO 1 UNITS: 100 INJECTION, SOLUTION INTRAVENOUS; SUBCUTANEOUS at 17:22

## 2022-09-25 RX ADMIN — PIPERACILLIN AND TAZOBACTAM 2.25 G: 2; .25 INJECTION, POWDER, LYOPHILIZED, FOR SOLUTION INTRAVENOUS at 17:22

## 2022-09-25 RX ADMIN — MIDODRINE HYDROCHLORIDE 10 MG: 5 TABLET ORAL at 06:30

## 2022-09-25 RX ADMIN — ALLOPURINOL 300 MG: 300 TABLET ORAL at 08:44

## 2022-09-25 RX ADMIN — CALCIUM ACETATE 667 MG: 667 CAPSULE ORAL at 08:44

## 2022-09-25 RX ADMIN — Medication 6 MG: at 21:02

## 2022-09-25 RX ADMIN — MIDODRINE HYDROCHLORIDE 10 MG: 5 TABLET ORAL at 17:22

## 2022-09-25 RX ADMIN — DICYCLOMINE HYDROCHLORIDE 10 MG: 10 CAPSULE ORAL at 21:02

## 2022-09-25 RX ADMIN — DICYCLOMINE HYDROCHLORIDE 10 MG: 10 CAPSULE ORAL at 17:22

## 2022-09-25 RX ADMIN — CHOLESTYRAMINE 4 G: 4 POWDER, FOR SUSPENSION ORAL at 08:44

## 2022-09-25 RX ADMIN — SODIUM CHLORIDE, SODIUM GLUCONATE, SODIUM ACETATE, POTASSIUM CHLORIDE, MAGNESIUM CHLORIDE, SODIUM PHOSPHATE, DIBASIC, AND POTASSIUM PHOSPHATE 250 ML: .53; .5; .37; .037; .03; .012; .00082 INJECTION, SOLUTION INTRAVENOUS at 22:28

## 2022-09-25 RX ADMIN — VANCOMYCIN HYDROCHLORIDE 125 MG: 125 CAPSULE ORAL at 12:07

## 2022-09-25 RX ADMIN — PIPERACILLIN AND TAZOBACTAM 2.25 G: 2; .25 INJECTION, POWDER, LYOPHILIZED, FOR SOLUTION INTRAVENOUS at 11:26

## 2022-09-25 RX ADMIN — PHYTONADIONE 5 MG: 5 TABLET ORAL at 12:07

## 2022-09-25 RX ADMIN — DICYCLOMINE HYDROCHLORIDE 10 MG: 10 CAPSULE ORAL at 12:07

## 2022-09-25 RX ADMIN — VANCOMYCIN HYDROCHLORIDE 125 MG: 125 CAPSULE ORAL at 17:22

## 2022-09-25 RX ADMIN — DICYCLOMINE HYDROCHLORIDE 10 MG: 10 CAPSULE ORAL at 06:30

## 2022-09-25 RX ADMIN — FLUTICASONE FUROATE AND VILANTEROL TRIFENATATE 1 PUFF: 200; 25 POWDER RESPIRATORY (INHALATION) at 08:44

## 2022-09-25 RX ADMIN — CALCIUM ACETATE 667 MG: 667 CAPSULE ORAL at 12:07

## 2022-09-25 RX ADMIN — CHOLESTYRAMINE 4 G: 4 POWDER, FOR SUSPENSION ORAL at 17:22

## 2022-09-25 RX ADMIN — VANCOMYCIN HYDROCHLORIDE 125 MG: 125 CAPSULE ORAL at 08:44

## 2022-09-25 RX ADMIN — VANCOMYCIN HYDROCHLORIDE 125 MG: 125 CAPSULE ORAL at 21:02

## 2022-09-25 RX ADMIN — CLOTRIMAZOLE: 0.01 CREAM TOPICAL at 17:23

## 2022-09-25 RX ADMIN — PANTOPRAZOLE SODIUM 40 MG: 40 INJECTION, POWDER, FOR SOLUTION INTRAVENOUS at 21:03

## 2022-09-25 RX ADMIN — CLOTRIMAZOLE: 0.01 CREAM TOPICAL at 08:44

## 2022-09-25 RX ADMIN — PIPERACILLIN AND TAZOBACTAM 2.25 G: 2; .25 INJECTION, POWDER, LYOPHILIZED, FOR SOLUTION INTRAVENOUS at 22:29

## 2022-09-25 NOTE — ASSESSMENT & PLAN NOTE
· DORA on CKD 3  · Most recent d/c creat 3-3 7 prior in 2 range  · CT no hydro, nonobstructing calculi  · UA neg  · Nephrology following  · 9/15 R tunneled HD cath placed  · 9/15 and 9/16 IHD  · Monitor for renal recovery remains oliguric  · S/p IHD on/ 9/19   · Resume demadex  · Currently on m/w/f schedule with no signs of renal recovery  · Temporary to PermCath conversion 09/21/2022  · No signs of renal recovery, CM has set up patient for HD at [place] [id number]  11:15 am for Monday  · S/P HD on 9/23, next hd session on monday

## 2022-09-25 NOTE — ASSESSMENT & PLAN NOTE
Lab Results   Component Value Date    HGBA1C 6 3 (H) 08/05/2022       Recent Labs     09/24/22  1054 09/24/22  1608 09/24/22 2057 09/25/22  0739   POCGLU 229* 165* 135 133       Blood Sugar Average: Last 72 hrs:  (P) 147 [id number]   · Hold lantus with DORA/hypoglycemia  · SSI

## 2022-09-25 NOTE — PROGRESS NOTES
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 2 weeks ago  Initial imaging showed air in the biliary tree that LFTs have been okay  His abdominal pain is better but he continues to have diarrhea  C diff has been negative  Note patient also has multiple myeloma and is receiving chemotherapy      I think the diarrhea is multifactorial probably were related to the antibiotics that he is receiving for the diverticulitis as well as a chemo effect from his myeloma  Treatment at this point is supportive  Correct electrolytes, symptomatic treatment w Chanel  During his colonoscopy that [facility] [place] he did not get random biopsies for microscopic colitis but I think this is unlikely also not safe to perform right now given active diverticulitis  I wonder if the chemotherapy could be or should be held for a period of time  Nausea vomiting  New onset of nausea as well as dark emesis and abdominal pain  Had EGD earlier this year with large hiatal hernia  Possible [place]-Tadeo tear  Clear liquid diet, antiemetics p r n , ppi b i d  With continued nausea vomiting overnight  Hemoglobin 8 5 from 8 5 yesterday  Will plan for EGD tomorrow to rule out upper GI source bleeding and nausea vomiting  Chief Complaint   Patient presents with    Abnormal Lab     Patient arrives via EMS from [facility] for an elevated creatinine of 7 3  Reports yellow emesis for a few days   Patient has RUQ and RLQ abdominal pain, is currently getting chemo once a week for kidney cancer  SUBJECTIVE/HPI   Still with dark brown stool in the rectal bag  Nausea vomiting and abdominal pain is improved today      /60   Pulse (!) 49   Temp 98 5 °F (36 9 °C)   Resp 18   Ht 6' 3" (1 905 m)   Wt 128 kg (282 lb 13 6 oz)   SpO2 95%   BMI 35 35 kg/m²     PHYSICALEXAM  General appearance: alert, appears stated age and cooperative  Eyes: PERLLA, EOMI, no icterus   Head: Normocephalic, without obvious abnormality, atraumatic  Lungs: clear to auscultation bilaterally  Heart: regular rate and rhythm, S1, S2 normal, no murmur, click, rub or gallop  Abdomen: soft, non-tender; bowel sounds normal; no masses,  no organomegaly  Extremities: extremities normal, atraumatic, no cyanosis or edema  Neurologic: Grossly normal    Lab Results   Component Value Date    GLUCOSE 64 (L) 08/01/2022    CALCIUM 8 8 09/25/2022     01/15/2018    K 3 7 09/25/2022    CO2 28 09/25/2022    CL 98 09/25/2022    BUN 31 (H) 09/25/2022    CREATININE 6 68 (H) 09/25/2022     Lab Results   Component Value Date    WBC 8 81 09/25/2022    HGB 8 5 (L) 09/25/2022    HCT 27 8 (L) 09/25/2022    MCV 87 09/25/2022     09/25/2022     Lab Results   Component Value Date    ALT 8 (L) 09/18/2022    AST 14 09/18/2022     (H) 11/03/2019    ALKPHOS 87 09/18/2022    BILITOT 0 6 01/15/2018     No results found for: AMYLASE  Lab Results   Component Value Date    LIPASE 691 (H) 09/13/2022     Lab Results   Component Value Date    IRON 17 (L) 09/23/2022    TIBC 174 (L) 09/23/2022    FERRITIN 479 (H) 09/23/2022     Lab Results   Component Value Date    INR 2 04 (H) 09/25/2022

## 2022-09-25 NOTE — ASSESSMENT & PLAN NOTE
· Follows with Dr Malina Ocampo  · On Velcade and decadron last tx 9/6  · Free light chains ordered outpt f/u with oncology

## 2022-09-25 NOTE — ASSESSMENT & PLAN NOTE
9/23 this morning patient started to develop some nausea and vomiting, vomiting noted to be dark in color, no dark stools noted in rectal tube  Gi started patient on IV PPI b i d    Clear liquid diet  Most likely secondary to large hiatal hernia on previous EGD  9/24 rectal tube noted to have dark stools  N/V resolved, continues to have abdominal tenderness  Hg 9 3->10->8 5  Gi planning EGD tomorrow  CT abd/pelvis official read pending

## 2022-09-25 NOTE — PLAN OF CARE
Problem: Potential for Falls  Goal: Patient will remain free of falls  Description: INTERVENTIONS:  - Educate patient/family on patient safety including physical limitations  - Instruct patient to call for assistance with activity   - Consult OT/PT to assist with strengthening/mobility   - Keep Call bell within reach  - Keep bed low and locked with side rails adjusted as appropriate  - Keep care items and personal belongings within reach  - Initiate and maintain comfort rounds  - Make Fall Risk Sign visible to staff  - Offer Toileting every 2 Hours, in advance of need  - Initiate/Maintain bed alarm  - Obtain necessary fall risk management equipment:   - Apply yellow socks and bracelet for high fall risk patients  - Consider moving patient to room near nurses station  Outcome: Progressing     Problem: MOBILITY - ADULT  Goal: Maintain or return to baseline ADL function  Description: INTERVENTIONS:  -  Assess patient's ability to carry out ADLs; assess patient's baseline for ADL function and identify physical deficits which impact ability to perform ADLs (bathing, care of mouth/teeth, toileting, grooming, dressing, etc )  - Assess/evaluate cause of self-care deficits   - Assess range of motion  - Assess patient's mobility; develop plan if impaired  - Assess patient's need for assistive devices and provide as appropriate  - Encourage maximum independence but intervene and supervise when necessary  - Involve family in performance of ADLs  - Assess for home care needs following discharge   - Consider OT consult to assist with ADL evaluation and planning for discharge  - Provide patient education as appropriate  Outcome: Progressing  Goal: Maintains/Returns to pre admission functional level  Description: INTERVENTIONS:  - Perform BMAT or MOVE assessment daily    - Set and communicate daily mobility goal to care team and patient/family/caregiver     - Collaborate with rehabilitation services on mobility goals if consulted  - Perform Range of Motion 2 times a day  - Reposition patient every 2 hours  - Out of bed for toileting  - Record patient progress and toleration of activity level   Outcome: Progressing     Problem: Prexisting or High Potential for Compromised Skin Integrity  Goal: Skin integrity is maintained or improved  Description: INTERVENTIONS:  - Identify patients at risk for skin breakdown  - Assess and monitor skin integrity  - Assess and monitor nutrition and hydration status  - Monitor labs   - Assess for incontinence   - Turn and reposition patient  - Assist with mobility/ambulation  - Relieve pressure over bony prominences  - Avoid friction and shearing  - Provide appropriate hygiene as needed including keeping skin clean and dry  - Evaluate need for skin moisturizer/barrier cream  - Collaborate with interdisciplinary team   - Patient/family teaching  - Consider wound care consult   Outcome: Progressing     Problem: Nutrition/Hydration-ADULT  Goal: Nutrient/Hydration intake appropriate for improving, restoring or maintaining nutritional needs  Description: Monitor and assess patient's nutrition/hydration status for malnutrition  Collaborate with interdisciplinary team and initiate plan and interventions as ordered  Monitor patient's weight and dietary intake as ordered or per policy  Utilize nutrition screening tool and intervene as necessary  Determine patient's food preferences and provide high-protein, high-caloric foods as appropriate       INTERVENTIONS:  - Monitor oral intake, urinary output, labs, and treatment plans  - Assess nutrition and hydration status and recommend course of action  - Evaluate amount of meals eaten  - Assist patient with eating if necessary   - Allow adequate time for meals  - Recommend/ encourage appropriate diets, oral nutritional supplements, and vitamin/mineral supplements  - Order, calculate, and assess calorie counts as needed  - Recommend, monitor, and adjust tube feedings and TPN/PPN based on assessed needs  - Assess need for intravenous fluids  - Provide specific nutrition/hydration education as appropriate  - Include patient/family/caregiver in decisions related to nutrition  Outcome: Progressing     Problem: GASTROINTESTINAL - ADULT  Goal: Minimal or absence of nausea and/or vomiting  Description: INTERVENTIONS:  - Administer IV fluids if ordered to ensure adequate hydration  - Maintain NPO status until nausea and vomiting are resolved  - Nasogastric tube if ordered  - Administer ordered antiemetic medications as needed  - Provide nonpharmacologic comfort measures as appropriate  - Advance diet as tolerated, if ordered  - Consider nutrition services referral to assist patient with adequate nutrition and appropriate food choices  Outcome: Progressing  Goal: Maintains or returns to baseline bowel function  Description: INTERVENTIONS:  - Assess bowel function  - Encourage oral fluids to ensure adequate hydration  - Administer IV fluids if ordered to ensure adequate hydration  - Administer ordered medications as needed  - Encourage mobilization and activity  - Consider nutritional services referral to assist patient with adequate nutrition and appropriate food choices  Outcome: Progressing  Goal: Maintains adequate nutritional intake  Description: INTERVENTIONS:  - Monitor percentage of each meal consumed  - Identify factors contributing to decreased intake, treat as appropriate  - Assist with meals as needed  - Monitor I&O, weight, and lab values if indicated  - Obtain nutrition services referral as needed  Outcome: Progressing     Problem: GENITOURINARY - ADULT  Goal: Maintains or returns to baseline urinary function  Description: INTERVENTIONS:  - Assess urinary function  - Encourage oral fluids to ensure adequate hydration if ordered  - Administer IV fluids as ordered to ensure adequate hydration  - Administer ordered medications as needed  - Offer frequent toileting  - Follow urinary retention protocol if ordered  Outcome: Progressing  Goal: Absence of urinary retention  Description: INTERVENTIONS:  - Assess patients ability to void and empty bladder  - Monitor I/O  - Bladder scan as needed  - Discuss with physician/AP medications to alleviate retention as needed  - Discuss catheterization for long term situations as appropriate  Outcome: Progressing  Goal: Urinary catheter remains patent  Description: INTERVENTIONS:  - Assess patency of urinary catheter  - If patient has a chronic mehta, consider changing catheter if non-functioning  - Follow guidelines for intermittent irrigation of non-functioning urinary catheter  Outcome: Progressing     Problem: METABOLIC, FLUID AND ELECTROLYTES - ADULT  Goal: Electrolytes maintained within normal limits  Description: INTERVENTIONS:  - Monitor labs and assess patient for signs and symptoms of electrolyte imbalances  - Administer electrolyte replacement as ordered  - Monitor response to electrolyte replacements, including repeat lab results as appropriate  - Instruct patient on fluid and nutrition as appropriate  Outcome: Progressing  Goal: Fluid balance maintained  Description: INTERVENTIONS:  - Monitor labs   - Monitor I/O and WT  - Instruct patient on fluid and nutrition as appropriate  - Assess for signs & symptoms of volume excess or deficit  Outcome: Progressing     Problem: PAIN - ADULT  Goal: Verbalizes/displays adequate comfort level or baseline comfort level  Description: Interventions:  - Encourage patient to monitor pain and request assistance  - Assess pain using appropriate pain scale  - Administer analgesics based on type and severity of pain and evaluate response  - Implement non-pharmacological measures as appropriate and evaluate response  - Consider cultural and social influences on pain and pain management  - Notify physician/advanced practitioner if interventions unsuccessful or patient reports new pain  Outcome: Progressing     Problem: INFECTION - ADULT  Goal: Absence or prevention of progression during hospitalization  Description: INTERVENTIONS:  - Assess and monitor for signs and symptoms of infection  - Monitor lab/diagnostic results  - Monitor all insertion sites, i e  indwelling lines, tubes, and drains  - Monitor endotracheal if appropriate and nasal secretions for changes in amount and color  - [place] appropriate cooling/warming therapies per order  - Administer medications as ordered  - Instruct and encourage patient and family to use good hand hygiene technique  - Identify and instruct in appropriate isolation precautions for identified infection/condition  Outcome: Progressing  Goal: Absence of fever/infection during neutropenic period  Description: INTERVENTIONS:  - Monitor WBC    Outcome: Progressing     Problem: SAFETY ADULT  Goal: Patient will remain free of falls  Description: INTERVENTIONS:  - Educate patient/family on patient safety including physical limitations  - Instruct patient to call for assistance with activity   - Consult OT/PT to assist with strengthening/mobility   - Keep Call bell within reach  - Keep bed low and locked with side rails adjusted as appropriate  - Keep care items and personal belongings within reach  - Initiate and maintain comfort rounds  - Make Fall Risk Sign visible to staff  - Offer Toileting every 2 Hours, in advance of need  - Initiate/Maintain bed alarm  - Obtain necessary fall risk management equipment:   - Apply yellow socks and bracelet for high fall risk patients  - Consider moving patient to room near nurses station  Outcome: Progressing  Goal: Maintain or return to baseline ADL function  Description: INTERVENTIONS:  -  Assess patient's ability to carry out ADLs; assess patient's baseline for ADL function and identify physical deficits which impact ability to perform ADLs (bathing, care of mouth/teeth, toileting, grooming, dressing, etc )  - Assess/evaluate cause of self-care deficits   - Assess range of motion  - Assess patient's mobility; develop plan if impaired  - Assess patient's need for assistive devices and provide as appropriate  - Encourage maximum independence but intervene and supervise when necessary  - Involve family in performance of ADLs  - Assess for home care needs following discharge   - Consider OT consult to assist with ADL evaluation and planning for discharge  - Provide patient education as appropriate  Outcome: Progressing  Goal: Maintains/Returns to pre admission functional level  Description: INTERVENTIONS:  - Perform BMAT or MOVE assessment daily    - Set and communicate daily mobility goal to care team and patient/family/caregiver  - Collaborate with rehabilitation services on mobility goals if consulted  - Perform Range of Motion 2 times a day  - Reposition patient every 2 hours  - Out of bed for toileting  - Record patient progress and toleration of activity level   Outcome: Progressing     Problem: DISCHARGE PLANNING  Goal: Discharge to home or other facility with appropriate resources  Description: INTERVENTIONS:  - Identify barriers to discharge w/patient and caregiver  - Arrange for needed discharge resources and transportation as appropriate  - Identify discharge learning needs (meds, wound care, etc )  - Arrange for interpretive services to assist at discharge as needed  - Refer to Case Management Department for coordinating discharge planning if the patient needs post-hospital services based on physician/advanced practitioner order or complex needs related to functional status, cognitive ability, or social support system  Outcome: Progressing     Problem: Knowledge Deficit  Goal: Patient/family/caregiver demonstrates understanding of disease process, treatment plan, medications, and discharge instructions  Description: Complete learning assessment and assess knowledge base    Interventions:  - Provide teaching at level of understanding  - Provide teaching via preferred learning methods  Outcome: Progressing

## 2022-09-25 NOTE — ASSESSMENT & PLAN NOTE
Wt Readings from Last 3 Encounters:   09/25/22 128 kg (282 lb 13 6 oz)   09/06/22 117 kg (257 lb 15 oz)   08/30/22 118 kg (260 lb)     · EF 45% decreased RV fx  · I/O  · Daily weight  · Currently being managed with HD  · Demadex discontinued due to low bp and minimal urine output

## 2022-09-25 NOTE — ASSESSMENT & PLAN NOTE
· CTAP-common bile duct stent with pneumobilia and air within gallblader  Distended gallbladder with cholelithiasis  · RUQ u/s distended gallbladder with air within stent in CBD similar to CT  · Surgery consulted  If concern for acute cholecystitis not surgical candidate would need per vanessa tube  · GI following  · ERCP 8/29/2022, Ashlee Weber esophagus seen during EGD, biopsy obtained   ERCP performed with endoscopic mucosal resection of abnormal looking ampullary tissue, PD, CBD stent placed    · No s/s acute cholecystitis monitor

## 2022-09-25 NOTE — PROGRESS NOTES
[place]  Progress Note - Kirstin Faster 1950, 81 y o  male MRN: [id number]  Unit/Bed#: -Dariusz Encounter: [id number]  Primary Care Provider: Jolly Lennox, MD   Date and time admitted to hospital: 9/12/2022  4:09 PM    * DORA (acute kidney injury) [facility])  Assessment & Plan  · DORA on CKD 3  · Most recent d/c creat 3-3 7 prior in 2 range  · CT no hydro, nonobstructing calculi  · UA neg  · Nephrology following  · 9/15 R tunneled HD cath placed  · 9/15 and 9/16 IHD  · Monitor for renal recovery remains oliguric  · S/p IHD on/ 9/19   · Resume demadex  · Currently on m/w/f schedule with no signs of renal recovery  · Temporary to PermCath conversion 09/21/2022  · No signs of renal recovery,  has set up patient for HD at [place] [id number]  11:15 am for Monday  · S/P HD on 9/23, next hd session on monday    Acute diverticulitis  Assessment & Plan  · 2nd episode in one month (tx with 10 days antibx 8/2-8/11)  · CTAP acute diverticulitis  · GI following  · 7/25 Colonoscopy--2 polyps removed, Mild diverticula in the descending colon and sigmoid colon, Small, internal hemorrhoids  · 9/22 finished 10 day course of zosyn  · Questran started 9/17   · Imodium PRN  · Bentyl added 9/18 by GI  · advance diet per gi recommendations  · Gi suspects  multifactorial probably were related to the antibiotics that he is receiving for the diverticulitis as well as a chemo effect from his myeloma  May need to hold chemo on discharge for a period of time  Treatment at this point is supportive  · Monitor rectal tube output, Stool appears much darker today  · After discontinuation of Zosyn the following day on 09/23 patient started to develop leukocytosis with elevated procalcitonin  · Consulted Infectious Disease, recommend obtaining CT abdomen pelvis with p o  Contrast which is has been completed but official read is still pending      · Id recommended checking for C diff - pending  · 9/24 Started patient on p o  Vancomycin and restarted Zosyn day 2    Nausea and vomiting  Assessment & Plan  9/23 this morning patient started to develop some nausea and vomiting, vomiting noted to be dark in color, no dark stools noted in rectal tube  Gi started patient on IV PPI b i d  Clear liquid diet  Most likely secondary to large hiatal hernia on previous EGD  9/24 rectal tube noted to have dark stools  N/V resolved, continues to have abdominal tenderness  Hg 9 3->10->8 5  Gi planning EGD tomorrow  CT abd/pelvis official read pending    Abnormal CT scan  Assessment & Plan  · CTAP-common bile duct stent with pneumobilia and air within gallblader  Distended gallbladder with cholelithiasis  · RUQ u/s distended gallbladder with air within stent in CBD similar to CT  · Surgery consulted  If concern for acute cholecystitis not surgical candidate would need per vanessa tube  · GI following  · ERCP 8/29/2022, Dr Sarina Stover, William Riojas esophagus seen during EGD, biopsy obtained   ERCP performed with endoscopic mucosal resection of abnormal looking ampullary tissue, PD, CBD stent placed    · No s/s acute cholecystitis monitor     Anemia  Assessment & Plan  · Baseline hgb 8  · Receives IV venofer  · Transfuse for hgb <7      Ambulatory dysfunction  Assessment & Plan  · 2/2 L AKA  · PT/OT    Multiple myeloma ([address] )  Assessment & Plan  · Follows with Dr Erin Holman  · On Velcade and decadron last tx 9/6  · Free light chains ordered outpt f/u with oncology    Diabetes mellitus, type 2 [facility])  Assessment & Plan  Lab Results   Component Value Date    HGBA1C 6 3 (H) 08/05/2022       Recent Labs     09/24/22  1054 09/24/22  1608 09/24/22  2057 09/25/22  0739   POCGLU 229* 165* 135 133       Blood Sugar Average: Last 72 hrs:  (P) 147 [id number]   · Hold lantus with DORA/hypoglycemia  · SSI    NALLELY (obstructive sleep apnea)  Assessment & Plan  · Refuses cpap    Chronic combined systolic and diastolic congestive heart failure (HCC)  Assessment & Plan  Wt Readings from Last 3 Encounters:   22 128 kg (282 lb 13 6 oz)   22 117 kg (257 lb 15 oz)   22 118 kg (260 lb)     · EF 45% decreased RV fx  · I/O  · Daily weight  · Currently being managed with HD  · Demadex discontinued due to low bp and minimal urine output          Morbid obesity ([address] )  Assessment & Plan  · Dietary education    Chronic atrial fibrillation [facility])  Assessment & Plan  ·  Bradycardia with Junctional escape beats 20's overnight with hypotension  · No rate control meds d/c in august due to bradycardia  · TSH 0 4  · Coumadin on hold due to ? Gi bleed  · INR today 2 0 will give another dose of vitamin K 5 mg  · Cards consulted recommending CPAP HS and occurs with apnea episodes             VTE Pharmacologic Prophylaxis: VTE Score: 6 High Risk (Score >/= 5) - Pharmacological DVT Prophylaxis Contraindicated  Sequential Compression Devices Ordered  Patient Centered Rounds: I performed bedside rounds with nursing staff today  Discussions with Specialists or Other Care Team Provider: Gi    Education and Discussions with Family / Patient: Patient declined call to   Time Spent for Care: 30 minutes  More than 50% of total time spent on counseling and coordination of care as described above  Current Length of Stay: 13 day(s)  Current Patient Status: Inpatient   Certification Statement: The patient will continue to require additional inpatient hospital stay due to GI bleed, IV abx  Discharge Plan: Anticipate discharge in 48-72 hrs to rehab facility  Code Status: Level 1 - Full Code    Subjective:   Patient seen examined at bedside  No acute events overnight  Admits to ongoing nausea with abdominal pain    Denies any fevers or chills    Objective:     Vitals:   Temp (24hrs), Av °F (37 2 °C), Min:98 5 °F (36 9 °C), Max:99 6 °F (37 6 °C)    Temp:  [98 5 °F (36 9 °C)-99 6 °F (37 6 °C)] 98 5 °F (36 9 °C)  HR:  [47-60] 49  Resp:  [18] 18  BP: (100-111)/(50-60) 105/60  SpO2: [95 %-96 %] 95 %  Body mass index is 35 35 kg/m²  Input and Output Summary (last 24 hours): Intake/Output Summary (Last 24 hours) at 9/25/2022 1046  Last data filed at 9/25/2022 0601  Gross per 24 hour   Intake --   Output 1050 ml   Net -1050 ml       Physical Exam:   Physical Exam  Vitals reviewed  Constitutional:       Appearance: He is obese  HENT:      Head: Normocephalic and atraumatic  Right Ear: External ear normal       Left Ear: External ear normal       Nose: Nose normal       Mouth/Throat:      Mouth: Mucous membranes are moist       Pharynx: Oropharynx is clear  Eyes:      Extraocular Movements: Extraocular movements intact  Cardiovascular:      Rate and Rhythm: Normal rate and regular rhythm  Heart sounds: Normal heart sounds  Pulmonary:      Effort: Pulmonary effort is normal       Breath sounds: Normal breath sounds  Abdominal:      General: Abdomen is flat  Palpations: Abdomen is soft  Tenderness: There is abdominal tenderness  There is no guarding or rebound  Musculoskeletal:      Cervical back: Normal range of motion  Right lower leg: No edema  Comments: Left AKA   Skin:     General: Skin is warm and dry  Neurological:      Mental Status: He is alert  Mental status is at baseline  Psychiatric:         Mood and Affect: Mood normal          Behavior: Behavior normal           Additional Data:     Labs:  Results from last 7 days   Lab Units 09/25/22  0446 09/21/22  0518 09/19/22  0442   WBC Thousand/uL 8 81   < > 4 44   HEMOGLOBIN g/dL 8 5*   < > 7 5*   HEMATOCRIT % 27 8*   < > 24 0*   PLATELETS Thousands/uL 189   < > 254   LYMPHO PCT %  --   --  28   MONO PCT %  --   --  5   EOS PCT %  --   --  3    < > = values in this interval not displayed       Results from last 7 days   Lab Units 09/25/22  0509   SODIUM mmol/L 137   POTASSIUM mmol/L 3 7   CHLORIDE mmol/L 98   CO2 mmol/L 28   BUN mg/dL 31*   CREATININE mg/dL 6 68*   ANION GAP mmol/L 11 CALCIUM mg/dL 8 8   ALBUMIN g/dL 2 3*   GLUCOSE RANDOM mg/dL 127     Results from last 7 days   Lab Units 09/25/22  0751   INR  2 04*     Results from last 7 days   Lab Units 09/25/22  0739 09/24/22  2057 09/24/22  1608 09/24/22  1054 09/24/22  0727 09/23/22  2037 09/23/22  1637 09/23/22  1109 09/23/22  0721 09/22/22 2010 09/22/22  1553 09/22/22  1203   POC GLUCOSE mg/dl 133 135 165* 229* 140 167* 142* 140 119 175* 144* 148*         Results from last 7 days   Lab Units 09/25/22  0509 09/24/22  0446   PROCALCITONIN ng/ml 7 79* 6 39*       Lines/Drains:  Invasive Devices  Report    Central Venous Catheter Line  Duration           CVC Central Lines 09/22/22 Double 3 days          Peripheral Intravenous Line  Duration           Peripheral IV 09/24/22 Dorsal (posterior); Right Wrist 1 day          Hemodialysis Catheter  Duration           HD Permanent Double Catheter 3 days          Drain  Duration           Rectal Tube With balloon 9 days                Central Line:  Goal for removal: N/A - Chronic PICC             Imaging: No pertinent imaging reviewed      Recent Cultures (last 7 days):         Last 24 Hours Medication List:   Current Facility-Administered Medications   Medication Dose Route Frequency Provider Last Rate    acetaminophen  650 mg Oral Q6H PRN Charlean Block, PA-C      albuterol  2 puff Inhalation Q6H PRN Charlean Block, PA-C      allopurinol  300 mg Oral Daily Charlean Block, PA-C      atorvastatin  40 mg Oral After Senia, PA-C      calcium acetate  667 mg Oral TID With Meals Valjean Glimpse, CRNP      cholestyramine sugar free  4 g Oral BID Charlean Block, PA-C      clotrimazole   Topical BID Charlean Block, PA-C      dicyclomine  10 mg Oral 4x Daily (AC & HS) Charlean Block, PA-C      fentanyl citrate (PF)   Intravenous Code/Trauma/Sedation Philip Damico MD      fluticasone-vilanterol  1 puff Inhalation Daily Charlean Block, PA-C      insulin lispro  1-6 Units Subcutaneous TID With Meals [facility] JOEY Foote      [START ON 9/26/2022] iron sucrose  100 mg Intravenous Once per day on Mon Wed Fri Malcolm Wu MD      loperamide  2 mg Oral 4x Daily PRN Howard Veloz PA-C      melatonin  6 mg Oral HS Howard Veloz PA-C      midazolam    Code/Trauma/Sedation Med Hakeem Baldwin MD      midodrine  10 mg Oral TID [facility] Malcolm Wu MD      ondansetron  4 mg Intravenous Q6H PRN Mary Pineda DO      pantoprazole  40 mg Intravenous Q12H [address] NICOLA Rae      phytonadione  5 mg Oral Once Mary Toadams, DO      piperacillin-tazobactam  2 25 g Intravenous Q6H Mary Pineda DO 2 25 g (09/25/22 0452)    trimethobenzamide  200 mg Intramuscular Q6H PRN Mary Pineda, DO      vancomycin  125 mg Oral 4x Daily Mary Pineda DO          Today, Patient Was Seen By: Tatyana Henry DO    **Please Note: This note may have been constructed using a voice recognition system  **

## 2022-09-25 NOTE — ASSESSMENT & PLAN NOTE
· 2nd episode in one month (tx with 10 days antibx 8/2-8/11)  · CTAP acute diverticulitis  · GI following  · 7/25 Colonoscopy--2 polyps removed, Mild diverticula in the descending colon and sigmoid colon, Small, internal hemorrhoids  · 9/22 finished 10 day course of zosyn  · Questran started 9/17   · Imodium PRN  · Bentyl added 9/18 by GI  · advance diet per gi recommendations  · Gi suspects  multifactorial probably were related to the antibiotics that he is receiving for the diverticulitis as well as a chemo effect from his myeloma  May need to hold chemo on discharge for a period of time  Treatment at this point is supportive  · Monitor rectal tube output, Stool appears much darker today  · After discontinuation of Zosyn the following day on 09/23 patient started to develop leukocytosis with elevated procalcitonin  · Consulted Infectious Disease, recommend obtaining CT abdomen pelvis with p o  Contrast which is has been completed but official read is still pending  · Id recommended checking for C diff - pending  · 9/24 Started patient on p o   Vancomycin and restarted Zosyn day 2 Other (Free Text): Patient quoted [id number]/excel v laser treatment of back. We discussed starting with forearms to assess response. Quote $400 to do forearms. Detail Level: Zone

## 2022-09-25 NOTE — PROGRESS NOTES
Received message from radiology about CT A/P showin   Distended gallbladder with cholelithiasis, wall thickening, and pericholecystic inflammatory change   There appear to be calculi extending into the cystic duct   Findings can be due to acute cholecystitis in the appropriate clinical setting      Consider gallbladder ultrasound  2   Previously seen inflammatory changes related to acute sigmoid diverticulitis are no longer visualized        3   Large hiatal hernia   Stomach is distended with ingested contents and gas   The duodenum is also distended with gas with tapering to normal caliber in the 3rd portion   This could be due to ileus        4   Irregular opacity in the lingula partially imaged could be due to atelectasis or scarring   Suggest 3 month follow-up unenhanced chest CT to assess stability      -> RUQ us ordered  -> discussed with surgery stating that pt in not a surgical candidate at this time, however if US shows wall thickening and pt complaining of pain would place Perc Nubia tube  -> If EGD shows Kyung Cornelia lesions or other complications related to hiatal hernia will evaluate for repair at that time  -> hepatic function panel ordered    -> while looking at his chart I saw that he Spiked fever of 101 7 @ 1556 on 22  Repeat blood cultures  UA   CXR  procal  Lactic    BP stable      Mildred Archer MD  2022

## 2022-09-25 NOTE — ASSESSMENT & PLAN NOTE
· 9/18 Bradycardia with Junctional escape beats 20's overnight with hypotension  · No rate control meds d/c in august due to bradycardia  · TSH 0 4  · Coumadin on hold due to ?  Gi bleed  · INR today 2 0 will give another dose of vitamin K 5 mg  · Cards consulted recommending CPAP HS and occurs with apnea episodes

## 2022-09-25 NOTE — PLAN OF CARE
Problem: Potential for Falls  Goal: Patient will remain free of falls  Description: INTERVENTIONS:  - Educate patient/family on patient safety including physical limitations  - Instruct patient to call for assistance with activity   - Consult OT/PT to assist with strengthening/mobility   - Keep Call bell within reach  - Keep bed low and locked with side rails adjusted as appropriate  - Keep care items and personal belongings within reach  - Initiate and maintain comfort rounds  - Make Fall Risk Sign visible to staff  - Offer Toileting every 2 Hours, in advance of need  - Initiate/Maintain bed alarm  - Obtain necessary fall risk management equipment:   - Apply yellow socks and bracelet for high fall risk patients  - Consider moving patient to room near nurses station  Outcome: Progressing     Problem: Prexisting or High Potential for Compromised Skin Integrity  Goal: Skin integrity is maintained or improved  Description: INTERVENTIONS:  - Identify patients at risk for skin breakdown  - Assess and monitor skin integrity  - Assess and monitor nutrition and hydration status  - Monitor labs   - Assess for incontinence   - Turn and reposition patient  - Assist with mobility/ambulation  - Relieve pressure over bony prominences  - Avoid friction and shearing  - Provide appropriate hygiene as needed including keeping skin clean and dry  - Evaluate need for skin moisturizer/barrier cream  - Collaborate with interdisciplinary team   - Patient/family teaching  - Consider wound care consult   Outcome: Progressing     Problem: GASTROINTESTINAL - ADULT  Goal: Maintains adequate nutritional intake  Description: INTERVENTIONS:  - Monitor percentage of each meal consumed  - Identify factors contributing to decreased intake, treat as appropriate  - Assist with meals as needed  - Monitor I&O, weight, and lab values if indicated  - Obtain nutrition services referral as needed  Outcome: Progressing     Problem: SAFETY ADULT  Goal: Patient will remain free of falls  Description: INTERVENTIONS:  - Educate patient/family on patient safety including physical limitations  - Instruct patient to call for assistance with activity   - Consult OT/PT to assist with strengthening/mobility   - Keep Call bell within reach  - Keep bed low and locked with side rails adjusted as appropriate  - Keep care items and personal belongings within reach  - Initiate and maintain comfort rounds  - Make Fall Risk Sign visible to staff  - Offer Toileting every 2 Hours, in advance of need  - Initiate/Maintain bed alarm  - Obtain necessary fall risk management equipment:   - Apply yellow socks and bracelet for high fall risk patients  - Consider moving patient to room near nurses station  Outcome: Progressing

## 2022-09-26 ENCOUNTER — APPOINTMENT (OUTPATIENT)
Dept: GASTROENTEROLOGY | Facility: HOSPITAL | Age: 72
DRG: 674 | End: 2022-09-26
Attending: INTERNAL MEDICINE
Payer: COMMERCIAL

## 2022-09-26 ENCOUNTER — APPOINTMENT (INPATIENT)
Dept: DIALYSIS | Facility: HOSPITAL | Age: 72
DRG: 674 | End: 2022-09-26
Payer: COMMERCIAL

## 2022-09-26 ENCOUNTER — ANESTHESIA EVENT (INPATIENT)
Dept: PERIOP | Facility: HOSPITAL | Age: 72
DRG: 674 | End: 2022-09-26
Payer: COMMERCIAL

## 2022-09-26 ENCOUNTER — APPOINTMENT (INPATIENT)
Dept: PERIOP | Facility: HOSPITAL | Age: 72
DRG: 674 | End: 2022-09-26
Attending: INTERNAL MEDICINE
Payer: COMMERCIAL

## 2022-09-26 ENCOUNTER — ANESTHESIA (INPATIENT)
Dept: GASTROENTEROLOGY | Facility: HOSPITAL | Age: 72
DRG: 674 | End: 2022-09-26
Payer: COMMERCIAL

## 2022-09-26 ENCOUNTER — ANESTHESIA (INPATIENT)
Dept: PERIOP | Facility: HOSPITAL | Age: 72
DRG: 674 | End: 2022-09-26
Payer: COMMERCIAL

## 2022-09-26 ENCOUNTER — ANESTHESIA EVENT (INPATIENT)
Dept: GASTROENTEROLOGY | Facility: HOSPITAL | Age: 72
DRG: 674 | End: 2022-09-26
Payer: COMMERCIAL

## 2022-09-26 LAB
ALBUMIN SERPL BCP-MCNC: 2.2 G/DL (ref 3.5–5)
ALBUMIN SERPL BCP-MCNC: 2.2 G/DL (ref 3.5–5)
ALP SERPL-CCNC: 129 U/L (ref 46–116)
ALP SERPL-CCNC: 131 U/L (ref 46–116)
ALT SERPL W P-5'-P-CCNC: 8 U/L (ref 12–78)
ALT SERPL W P-5'-P-CCNC: 9 U/L (ref 12–78)
ANION GAP SERPL CALCULATED.3IONS-SCNC: 13 MMOL/L (ref 4–13)
AST SERPL W P-5'-P-CCNC: 18 U/L (ref 5–45)
AST SERPL W P-5'-P-CCNC: 20 U/L (ref 5–45)
ATRIAL RATE: 54 BPM
BILIRUB DIRECT SERPL-MCNC: 0.3 MG/DL (ref 0–0.2)
BILIRUB SERPL-MCNC: 0.9 MG/DL (ref 0.2–1)
BILIRUB SERPL-MCNC: 0.9 MG/DL (ref 0.2–1)
BUN SERPL-MCNC: 38 MG/DL (ref 5–25)
CALCIUM ALBUM COR SERPL-MCNC: 10 MG/DL (ref 8.3–10.1)
CALCIUM SERPL-MCNC: 8.6 MG/DL (ref 8.3–10.1)
CHLORIDE SERPL-SCNC: 96 MMOL/L (ref 96–108)
CO2 SERPL-SCNC: 26 MMOL/L (ref 21–32)
CREAT SERPL-MCNC: 7.69 MG/DL (ref 0.6–1.3)
ERYTHROCYTE [DISTWIDTH] IN BLOOD BY AUTOMATED COUNT: 17.5 % (ref 11.6–15.1)
GFR SERPL CREATININE-BSD FRML MDRD: 6 ML/MIN/1.73SQ M
GLUCOSE SERPL-MCNC: 118 MG/DL (ref 65–140)
GLUCOSE SERPL-MCNC: 134 MG/DL (ref 65–140)
GLUCOSE SERPL-MCNC: 137 MG/DL (ref 65–140)
GLUCOSE SERPL-MCNC: 170 MG/DL (ref 65–140)
GLUCOSE SERPL-MCNC: 188 MG/DL (ref 65–140)
HCT VFR BLD AUTO: 26.9 % (ref 36.5–49.3)
HGB BLD-MCNC: 8.2 G/DL (ref 12–17)
LACTATE SERPL-SCNC: 1.5 MMOL/L (ref 0.5–2)
MAGNESIUM SERPL-MCNC: 2 MG/DL (ref 1.6–2.6)
MCH RBC QN AUTO: 25.9 PG (ref 26.8–34.3)
MCHC RBC AUTO-ENTMCNC: 30.5 G/DL (ref 31.4–37.4)
MCV RBC AUTO: 85 FL (ref 82–98)
PLATELET # BLD AUTO: 199 THOUSANDS/UL (ref 149–390)
PMV BLD AUTO: 10.2 FL (ref 8.9–12.7)
POTASSIUM SERPL-SCNC: 3.8 MMOL/L (ref 3.5–5.3)
PROCALCITONIN SERPL-MCNC: 14.18 NG/ML
PROT SERPL-MCNC: 6.2 G/DL (ref 6.4–8.4)
PROT SERPL-MCNC: 6.3 G/DL (ref 6.4–8.4)
QRS AXIS: -3 DEGREES
QRSD INTERVAL: 132 MS
QT INTERVAL: 466 MS
QTC INTERVAL: 488 MS
RBC # BLD AUTO: 3.16 MILLION/UL (ref 3.88–5.62)
SODIUM SERPL-SCNC: 135 MMOL/L (ref 135–147)
T WAVE AXIS: 13 DEGREES
VENTRICULAR RATE: 66 BPM
WBC # BLD AUTO: 9.95 THOUSAND/UL (ref 4.31–10.16)

## 2022-09-26 PROCEDURE — 88342 IMHCHEM/IMCYTCHM 1ST ANTB: CPT | Performed by: PATHOLOGY

## 2022-09-26 PROCEDURE — 0D968ZZ DRAINAGE OF STOMACH, VIA NATURAL OR ARTIFICIAL OPENING ENDOSCOPIC: ICD-10-PCS | Performed by: INTERNAL MEDICINE

## 2022-09-26 PROCEDURE — 88305 TISSUE EXAM BY PATHOLOGIST: CPT | Performed by: PATHOLOGY

## 2022-09-26 PROCEDURE — 99232 SBSQ HOSP IP/OBS MODERATE 35: CPT | Performed by: SURGERY

## 2022-09-26 PROCEDURE — 85027 COMPLETE CBC AUTOMATED: CPT | Performed by: STUDENT IN AN ORGANIZED HEALTH CARE EDUCATION/TRAINING PROGRAM

## 2022-09-26 PROCEDURE — 99232 SBSQ HOSP IP/OBS MODERATE 35: CPT | Performed by: INTERNAL MEDICINE

## 2022-09-26 PROCEDURE — 80076 HEPATIC FUNCTION PANEL: CPT | Performed by: PHYSICIAN ASSISTANT

## 2022-09-26 PROCEDURE — 43239 EGD BIOPSY SINGLE/MULTIPLE: CPT | Performed by: INTERNAL MEDICINE

## 2022-09-26 PROCEDURE — 93010 ELECTROCARDIOGRAM REPORT: CPT | Performed by: INTERNAL MEDICINE

## 2022-09-26 PROCEDURE — C9113 INJ PANTOPRAZOLE SODIUM, VIA: HCPCS | Performed by: REGISTERED NURSE

## 2022-09-26 PROCEDURE — 84145 PROCALCITONIN (PCT): CPT | Performed by: STUDENT IN AN ORGANIZED HEALTH CARE EDUCATION/TRAINING PROGRAM

## 2022-09-26 PROCEDURE — 83735 ASSAY OF MAGNESIUM: CPT | Performed by: STUDENT IN AN ORGANIZED HEALTH CARE EDUCATION/TRAINING PROGRAM

## 2022-09-26 PROCEDURE — 82948 REAGENT STRIP/BLOOD GLUCOSE: CPT

## 2022-09-26 RX ORDER — SODIUM CHLORIDE 9 MG/ML
INJECTION, SOLUTION INTRAVENOUS CONTINUOUS PRN
Status: DISCONTINUED | OUTPATIENT
Start: 2022-09-26 | End: 2022-09-26

## 2022-09-26 RX ORDER — LIDOCAINE HYDROCHLORIDE 10 MG/ML
INJECTION, SOLUTION EPIDURAL; INFILTRATION; INTRACAUDAL; PERINEURAL AS NEEDED
Status: DISCONTINUED | OUTPATIENT
Start: 2022-09-26 | End: 2022-09-26

## 2022-09-26 RX ORDER — ONDANSETRON 2 MG/ML
4 INJECTION INTRAMUSCULAR; INTRAVENOUS ONCE AS NEEDED
Status: DISCONTINUED | OUTPATIENT
Start: 2022-09-26 | End: 2022-09-26

## 2022-09-26 RX ORDER — CHOLECALCIFEROL (VITAMIN D3) 10 MCG
1 TABLET ORAL
Status: DISCONTINUED | OUTPATIENT
Start: 2022-09-26 | End: 2022-10-04 | Stop reason: HOSPADM

## 2022-09-26 RX ORDER — GLYCOPYRROLATE 0.2 MG/ML
INJECTION INTRAMUSCULAR; INTRAVENOUS AS NEEDED
Status: DISCONTINUED | OUTPATIENT
Start: 2022-09-26 | End: 2022-09-26

## 2022-09-26 RX ORDER — PROMETHAZINE HYDROCHLORIDE 25 MG/ML
12.5 INJECTION, SOLUTION INTRAMUSCULAR; INTRAVENOUS ONCE AS NEEDED
Status: DISCONTINUED | OUTPATIENT
Start: 2022-09-26 | End: 2022-09-26

## 2022-09-26 RX ORDER — CALCIUM CHLORIDE 100 MG/ML
INJECTION INTRAVENOUS; INTRAVENTRICULAR AS NEEDED
Status: DISCONTINUED | OUTPATIENT
Start: 2022-09-26 | End: 2022-09-26

## 2022-09-26 RX ORDER — VANCOMYCIN HYDROCHLORIDE 125 MG/1
125 CAPSULE ORAL 2 TIMES DAILY
Status: DISCONTINUED | OUTPATIENT
Start: 2022-09-26 | End: 2022-10-04 | Stop reason: HOSPADM

## 2022-09-26 RX ORDER — ALBUTEROL SULFATE 2.5 MG/3ML
2.5 SOLUTION RESPIRATORY (INHALATION) ONCE AS NEEDED
Status: DISCONTINUED | OUTPATIENT
Start: 2022-09-26 | End: 2022-09-26

## 2022-09-26 RX ORDER — FENTANYL CITRATE/PF 50 MCG/ML
25 SYRINGE (ML) INJECTION
Status: DISCONTINUED | OUTPATIENT
Start: 2022-09-26 | End: 2022-09-26

## 2022-09-26 RX ORDER — PROPOFOL 10 MG/ML
INJECTION, EMULSION INTRAVENOUS AS NEEDED
Status: DISCONTINUED | OUTPATIENT
Start: 2022-09-26 | End: 2022-09-26

## 2022-09-26 RX ORDER — SUCCINYLCHOLINE/SOD CL,ISO/PF 100 MG/5ML
SYRINGE (ML) INTRAVENOUS AS NEEDED
Status: DISCONTINUED | OUTPATIENT
Start: 2022-09-26 | End: 2022-09-26

## 2022-09-26 RX ORDER — METOCLOPRAMIDE HYDROCHLORIDE 5 MG/ML
5 INJECTION INTRAMUSCULAR; INTRAVENOUS EVERY 12 HOURS PRN
Status: DISCONTINUED | OUTPATIENT
Start: 2022-09-26 | End: 2022-09-29

## 2022-09-26 RX ORDER — PHENYLEPHRINE HCL IN 0.9% NACL 1 MG/10 ML
SYRINGE (ML) INTRAVENOUS AS NEEDED
Status: DISCONTINUED | OUTPATIENT
Start: 2022-09-26 | End: 2022-09-26

## 2022-09-26 RX ORDER — HYDROMORPHONE HCL/PF 1 MG/ML
0.5 SYRINGE (ML) INJECTION
Status: DISCONTINUED | OUTPATIENT
Start: 2022-09-26 | End: 2022-09-26

## 2022-09-26 RX ADMIN — LIDOCAINE HYDROCHLORIDE 50 MG: 10 INJECTION, SOLUTION EPIDURAL; INFILTRATION; INTRACAUDAL; PERINEURAL at 10:11

## 2022-09-26 RX ADMIN — NEPHROCAP 1 CAPSULE: 1 CAP ORAL at 16:25

## 2022-09-26 RX ADMIN — CHOLESTYRAMINE 4 G: 4 POWDER, FOR SUSPENSION ORAL at 11:45

## 2022-09-26 RX ADMIN — PIPERACILLIN AND TAZOBACTAM 2.25 G: 2; .25 INJECTION, POWDER, LYOPHILIZED, FOR SOLUTION INTRAVENOUS at 04:45

## 2022-09-26 RX ADMIN — MIDODRINE HYDROCHLORIDE 10 MG: 5 TABLET ORAL at 16:25

## 2022-09-26 RX ADMIN — IRON SUCROSE 100 MG: 20 INJECTION, SOLUTION INTRAVENOUS at 11:36

## 2022-09-26 RX ADMIN — VANCOMYCIN HYDROCHLORIDE 125 MG: 125 CAPSULE ORAL at 11:45

## 2022-09-26 RX ADMIN — FLUTICASONE FUROATE AND VILANTEROL TRIFENATATE 1 PUFF: 200; 25 POWDER RESPIRATORY (INHALATION) at 12:06

## 2022-09-26 RX ADMIN — MIDODRINE HYDROCHLORIDE 10 MG: 5 TABLET ORAL at 05:54

## 2022-09-26 RX ADMIN — Medication 100 MCG: at 10:10

## 2022-09-26 RX ADMIN — SODIUM CHLORIDE: 0.9 INJECTION, SOLUTION INTRAVENOUS at 10:07

## 2022-09-26 RX ADMIN — CHOLESTYRAMINE 4 G: 4 POWDER, FOR SUSPENSION ORAL at 21:11

## 2022-09-26 RX ADMIN — PROPOFOL 100 MG: 10 INJECTION, EMULSION INTRAVENOUS at 10:11

## 2022-09-26 RX ADMIN — CLOTRIMAZOLE: 0.01 CREAM TOPICAL at 17:09

## 2022-09-26 RX ADMIN — PROPOFOL 100 MG: 10 INJECTION, EMULSION INTRAVENOUS at 10:12

## 2022-09-26 RX ADMIN — Medication 140 MG: at 10:11

## 2022-09-26 RX ADMIN — MIDODRINE HYDROCHLORIDE 10 MG: 5 TABLET ORAL at 12:06

## 2022-09-26 RX ADMIN — VANCOMYCIN HYDROCHLORIDE 125 MG: 125 CAPSULE ORAL at 17:09

## 2022-09-26 RX ADMIN — PANTOPRAZOLE SODIUM 40 MG: 40 INJECTION, POWDER, FOR SOLUTION INTRAVENOUS at 11:45

## 2022-09-26 RX ADMIN — CLOTRIMAZOLE: 0.01 CREAM TOPICAL at 11:46

## 2022-09-26 RX ADMIN — DICYCLOMINE HYDROCHLORIDE 10 MG: 10 CAPSULE ORAL at 21:11

## 2022-09-26 RX ADMIN — DICYCLOMINE HYDROCHLORIDE 10 MG: 10 CAPSULE ORAL at 05:53

## 2022-09-26 RX ADMIN — Medication 100 MCG: at 10:11

## 2022-09-26 RX ADMIN — CALCIUM ACETATE 667 MG: 667 CAPSULE ORAL at 11:45

## 2022-09-26 RX ADMIN — ATORVASTATIN CALCIUM 40 MG: 40 TABLET, FILM COATED ORAL at 17:09

## 2022-09-26 RX ADMIN — DICYCLOMINE HYDROCHLORIDE 10 MG: 10 CAPSULE ORAL at 16:25

## 2022-09-26 RX ADMIN — CALCIUM CHLORIDE 1 G: 100 INJECTION INTRAVENOUS; INTRAVENTRICULAR at 10:19

## 2022-09-26 RX ADMIN — CALCIUM ACETATE 667 MG: 667 CAPSULE ORAL at 16:25

## 2022-09-26 RX ADMIN — Medication 6 MG: at 21:11

## 2022-09-26 RX ADMIN — MEROPENEM 500 MG: 500 INJECTION, POWDER, FOR SOLUTION INTRAVENOUS at 16:21

## 2022-09-26 RX ADMIN — DICYCLOMINE HYDROCHLORIDE 10 MG: 10 CAPSULE ORAL at 12:06

## 2022-09-26 RX ADMIN — ALLOPURINOL 300 MG: 300 TABLET ORAL at 11:45

## 2022-09-26 RX ADMIN — ONDANSETRON 4 MG: 2 INJECTION INTRAMUSCULAR; INTRAVENOUS at 10:23

## 2022-09-26 NOTE — PLAN OF CARE
Post-Dialysis RN Treatment Note    Blood Pressure:  Pre 111/52 mm/Hg  Post 105/64 mmHg   EDW  TBD kg    Weight:  Pre 115 3 kg   Post 113 2 kg   Mode of weight measurement: Bed Scale   Volume Removed  2000 ml    Treatment duration 180 minutes    NS given  No    Treatment shortened?  No   Medications given during Rx None Reported   Estimated Kt/V  Not Applicable   Access type: Permacath/TDC   Access Issues: No    Report called to primary nurse   Yes  Keerthi Murphy

## 2022-09-26 NOTE — PLAN OF CARE
Problem: Potential for Falls  Goal: Patient will remain free of falls  Description: INTERVENTIONS:  - Educate patient/family on patient safety including physical limitations  - Instruct patient to call for assistance with activity   - Consult OT/PT to assist with strengthening/mobility   - Keep Call bell within reach  - Keep bed low and locked with side rails adjusted as appropriate  - Keep care items and personal belongings within reach  - Initiate and maintain comfort rounds  - Make Fall Risk Sign visible to staff  - Offer Toileting every 2 Hours, in advance of need  - Initiate/Maintain bed alarm  - Obtain necessary fall risk management equipment:   - Apply yellow socks and bracelet for high fall risk patients  - Consider moving patient to room near nurses station  Outcome: Progressing     Problem: MOBILITY - ADULT  Goal: Maintain or return to baseline ADL function  Description: INTERVENTIONS:  -  Assess patient's ability to carry out ADLs; assess patient's baseline for ADL function and identify physical deficits which impact ability to perform ADLs (bathing, care of mouth/teeth, toileting, grooming, dressing, etc )  - Assess/evaluate cause of self-care deficits   - Assess range of motion  - Assess patient's mobility; develop plan if impaired  - Assess patient's need for assistive devices and provide as appropriate  - Encourage maximum independence but intervene and supervise when necessary  - Involve family in performance of ADLs  - Assess for home care needs following discharge   - Consider OT consult to assist with ADL evaluation and planning for discharge  - Provide patient education as appropriate  Outcome: Progressing  Goal: Maintains/Returns to pre admission functional level  Description: INTERVENTIONS:  - Perform BMAT or MOVE assessment daily    - Set and communicate daily mobility goal to care team and patient/family/caregiver     - Collaborate with rehabilitation services on mobility goals if consulted  - Perform Range of Motion 2 times a day  - Reposition patient every 2 hours  - Out of bed for toileting  - Record patient progress and toleration of activity level   Outcome: Progressing     Problem: Prexisting or High Potential for Compromised Skin Integrity  Goal: Skin integrity is maintained or improved  Description: INTERVENTIONS:  - Identify patients at risk for skin breakdown  - Assess and monitor skin integrity  - Assess and monitor nutrition and hydration status  - Monitor labs   - Assess for incontinence   - Turn and reposition patient  - Assist with mobility/ambulation  - Relieve pressure over bony prominences  - Avoid friction and shearing  - Provide appropriate hygiene as needed including keeping skin clean and dry  - Evaluate need for skin moisturizer/barrier cream  - Collaborate with interdisciplinary team   - Patient/family teaching  - Consider wound care consult   Outcome: Progressing     Problem: Nutrition/Hydration-ADULT  Goal: Nutrient/Hydration intake appropriate for improving, restoring or maintaining nutritional needs  Description: Monitor and assess patient's nutrition/hydration status for malnutrition  Collaborate with interdisciplinary team and initiate plan and interventions as ordered  Monitor patient's weight and dietary intake as ordered or per policy  Utilize nutrition screening tool and intervene as necessary  Determine patient's food preferences and provide high-protein, high-caloric foods as appropriate       INTERVENTIONS:  - Monitor oral intake, urinary output, labs, and treatment plans  - Assess nutrition and hydration status and recommend course of action  - Evaluate amount of meals eaten  - Assist patient with eating if necessary   - Allow adequate time for meals  - Recommend/ encourage appropriate diets, oral nutritional supplements, and vitamin/mineral supplements  - Order, calculate, and assess calorie counts as needed  - Recommend, monitor, and adjust tube feedings and TPN/PPN based on assessed needs  - Assess need for intravenous fluids  - Provide specific nutrition/hydration education as appropriate  - Include patient/family/caregiver in decisions related to nutrition  Outcome: Progressing     Problem: GASTROINTESTINAL - ADULT  Goal: Minimal or absence of nausea and/or vomiting  Description: INTERVENTIONS:  - Administer IV fluids if ordered to ensure adequate hydration  - Maintain NPO status until nausea and vomiting are resolved  - Nasogastric tube if ordered  - Administer ordered antiemetic medications as needed  - Provide nonpharmacologic comfort measures as appropriate  - Advance diet as tolerated, if ordered  - Consider nutrition services referral to assist patient with adequate nutrition and appropriate food choices  Outcome: Progressing  Goal: Maintains or returns to baseline bowel function  Description: INTERVENTIONS:  - Assess bowel function  - Encourage oral fluids to ensure adequate hydration  - Administer IV fluids if ordered to ensure adequate hydration  - Administer ordered medications as needed  - Encourage mobilization and activity  - Consider nutritional services referral to assist patient with adequate nutrition and appropriate food choices  Outcome: Progressing  Goal: Maintains adequate nutritional intake  Description: INTERVENTIONS:  - Monitor percentage of each meal consumed  - Identify factors contributing to decreased intake, treat as appropriate  - Assist with meals as needed  - Monitor I&O, weight, and lab values if indicated  - Obtain nutrition services referral as needed  Outcome: Progressing     Problem: GENITOURINARY - ADULT  Goal: Maintains or returns to baseline urinary function  Description: INTERVENTIONS:  - Assess urinary function  - Encourage oral fluids to ensure adequate hydration if ordered  - Administer IV fluids as ordered to ensure adequate hydration  - Administer ordered medications as needed  - Offer frequent toileting  - Follow urinary retention protocol if ordered  Outcome: Progressing  Goal: Absence of urinary retention  Description: INTERVENTIONS:  - Assess patients ability to void and empty bladder  - Monitor I/O  - Bladder scan as needed  - Discuss with physician/AP medications to alleviate retention as needed  - Discuss catheterization for long term situations as appropriate  Outcome: Progressing  Goal: Urinary catheter remains patent  Description: INTERVENTIONS:  - Assess patency of urinary catheter  - If patient has a chronic mehta, consider changing catheter if non-functioning  - Follow guidelines for intermittent irrigation of non-functioning urinary catheter  Outcome: Progressing     Problem: METABOLIC, FLUID AND ELECTROLYTES - ADULT  Goal: Electrolytes maintained within normal limits  Description: INTERVENTIONS:  - Monitor labs and assess patient for signs and symptoms of electrolyte imbalances  - Administer electrolyte replacement as ordered  - Monitor response to electrolyte replacements, including repeat lab results as appropriate  - Instruct patient on fluid and nutrition as appropriate  Outcome: Progressing  Goal: Fluid balance maintained  Description: INTERVENTIONS:  - Monitor labs   - Monitor I/O and WT  - Instruct patient on fluid and nutrition as appropriate  - Assess for signs & symptoms of volume excess or deficit  Outcome: Progressing     Problem: PAIN - ADULT  Goal: Verbalizes/displays adequate comfort level or baseline comfort level  Description: Interventions:  - Encourage patient to monitor pain and request assistance  - Assess pain using appropriate pain scale  - Administer analgesics based on type and severity of pain and evaluate response  - Implement non-pharmacological measures as appropriate and evaluate response  - Consider cultural and social influences on pain and pain management  - Notify physician/advanced practitioner if interventions unsuccessful or patient reports new pain  Outcome: Progressing     Problem: INFECTION - ADULT  Goal: Absence or prevention of progression during hospitalization  Description: INTERVENTIONS:  - Assess and monitor for signs and symptoms of infection  - Monitor lab/diagnostic results  - Monitor all insertion sites, i e  indwelling lines, tubes, and drains  - Monitor endotracheal if appropriate and nasal secretions for changes in amount and color  - [place] appropriate cooling/warming therapies per order  - Administer medications as ordered  - Instruct and encourage patient and family to use good hand hygiene technique  - Identify and instruct in appropriate isolation precautions for identified infection/condition  Outcome: Progressing  Goal: Absence of fever/infection during neutropenic period  Description: INTERVENTIONS:  - Monitor WBC    Outcome: Progressing     Problem: SAFETY ADULT  Goal: Patient will remain free of falls  Description: INTERVENTIONS:  - Educate patient/family on patient safety including physical limitations  - Instruct patient to call for assistance with activity   - Consult OT/PT to assist with strengthening/mobility   - Keep Call bell within reach  - Keep bed low and locked with side rails adjusted as appropriate  - Keep care items and personal belongings within reach  - Initiate and maintain comfort rounds  - Make Fall Risk Sign visible to staff  - Offer Toileting every 2 Hours, in advance of need  - Initiate/Maintain bed alarm  - Obtain necessary fall risk management equipment:   - Apply yellow socks and bracelet for high fall risk patients  - Consider moving patient to room near nurses station  Outcome: Progressing  Goal: Maintain or return to baseline ADL function  Description: INTERVENTIONS:  -  Assess patient's ability to carry out ADLs; assess patient's baseline for ADL function and identify physical deficits which impact ability to perform ADLs (bathing, care of mouth/teeth, toileting, grooming, dressing, etc )  - Assess/evaluate cause of self-care deficits   - Assess range of motion  - Assess patient's mobility; develop plan if impaired  - Assess patient's need for assistive devices and provide as appropriate  - Encourage maximum independence but intervene and supervise when necessary  - Involve family in performance of ADLs  - Assess for home care needs following discharge   - Consider OT consult to assist with ADL evaluation and planning for discharge  - Provide patient education as appropriate  Outcome: Progressing  Goal: Maintains/Returns to pre admission functional level  Description: INTERVENTIONS:  - Perform BMAT or MOVE assessment daily    - Set and communicate daily mobility goal to care team and patient/family/caregiver  - Collaborate with rehabilitation services on mobility goals if consulted  - Perform Range of Motion 2 times a day  - Reposition patient every 2 hours  - Out of bed for toileting  - Record patient progress and toleration of activity level   Outcome: Progressing     Problem: DISCHARGE PLANNING  Goal: Discharge to home or other facility with appropriate resources  Description: INTERVENTIONS:  - Identify barriers to discharge w/patient and caregiver  - Arrange for needed discharge resources and transportation as appropriate  - Identify discharge learning needs (meds, wound care, etc )  - Arrange for interpretive services to assist at discharge as needed  - Refer to Case Management Department for coordinating discharge planning if the patient needs post-hospital services based on physician/advanced practitioner order or complex needs related to functional status, cognitive ability, or social support system  Outcome: Progressing     Problem: Knowledge Deficit  Goal: Patient/family/caregiver demonstrates understanding of disease process, treatment plan, medications, and discharge instructions  Description: Complete learning assessment and assess knowledge base    Interventions:  - Provide teaching at level of understanding  - Provide teaching via preferred learning methods  Outcome: Progressing

## 2022-09-26 NOTE — ASSESSMENT & PLAN NOTE
· 9/18 Bradycardia with Junctional escape beats 20's overnight with hypotension  · No rate control meds d/c in august due to bradycardia  · TSH 0 4  · Coumadin on hold due to ?  Gi bleed  · PT INR  · Cards consulted recommending CPAP HS and occurs with apnea episodes

## 2022-09-26 NOTE — ASSESSMENT & PLAN NOTE
9/23 this morning patient started to develop some nausea and vomiting, vomiting noted to be dark in color, no dark stools noted in rectal tube  Gi started patient on IV PPI b i d  Clear liquid diet  Most likely secondary to large hiatal hernia on previous EGD  9/24 rectal tube noted to have dark stools  N/V resolved, continues to have abdominal tenderness  Hg 9 3->10->8 5  Gi planning EGD tomorrow  CT abd/pelvis- Distended gallbladder with cholelithiasis, wall thickening, and pericholecystic inflammatory change  There appear to be calculi extending into the cystic duct  Findings can be due to acute cholecystitis in the appropriate clinical setting  Consider gallbladder ultrasound  2  Previously seen inflammatory changes related to acute sigmoid diverticulitis are no longer visualized "  RUQ US- " Again seen is a distended gallbladder with gallbladder wall thickening up to 15 mm, also containing intraluminal air, gallstones, sludge  Sonographic Lentz's sign is negative "  Surgical follow-up

## 2022-09-26 NOTE — ASSESSMENT & PLAN NOTE
Wt Readings from Last 3 Encounters:   09/26/22 130 kg (285 lb 15 oz)   09/06/22 117 kg (257 lb 15 oz)   08/30/22 118 kg (260 lb)     · EF 45% decreased RV fx  · I/O  · Daily weight  · Currently being managed with HD  · Demadex discontinued due to low bp and minimal urine output

## 2022-09-26 NOTE — ASSESSMENT & PLAN NOTE
· CTAP-common bile duct stent with pneumobilia and air within gallblader  Distended gallbladder with cholelithiasis  · RUQ u/s distended gallbladder with air within stent in CBD similar to CT  · Surgery consulted  If concern for acute cholecystitis not surgical candidate would need per vanessa tube  · GI following  · ERCP 8/29/2022, Dr Steffany Castillo, Made[place] esophagus seen during EGD, biopsy obtained   ERCP performed with endoscopic mucosal resection of abnormal looking ampullary tissue, PD, CBD stent placed    · No s/s acute cholecystitis monitor

## 2022-09-26 NOTE — PROGRESS NOTES
[place]  Progress Note - Brenda Graves 1950, 81 y o  male MRN: [id number]  Unit/Bed#: -01 Encounter: [id number]  Primary Care Provider: Bobby Alvarado MD   Date and time admitted to hospital: 9/12/2022  4:09 PM    Nausea and vomiting  Assessment & Plan  9/23 this morning patient started to develop some nausea and vomiting, vomiting noted to be dark in color, no dark stools noted in rectal tube  Gi started patient on IV PPI b i d  Clear liquid diet  Most likely secondary to large hiatal hernia on previous EGD  9/24 rectal tube noted to have dark stools  N/V resolved, continues to have abdominal tenderness  Hg 9 3->10->8 5  Gi planning EGD tomorrow  CT abd/pelvis- Distended gallbladder with cholelithiasis, wall thickening, and pericholecystic inflammatory change  There appear to be calculi extending into the cystic duct  Findings can be due to acute cholecystitis in the appropriate clinical setting  Consider gallbladder ultrasound  2  Previously seen inflammatory changes related to acute sigmoid diverticulitis are no longer visualized "  RUQ US- " Again seen is a distended gallbladder with gallbladder wall thickening up to 15 mm, also containing intraluminal air, gallstones, sludge  Sonographic Lentz's sign is negative "  Surgical follow-up    Abnormal CT scan  Assessment & Plan  · CTAP-common bile duct stent with pneumobilia and air within gallblader  Distended gallbladder with cholelithiasis  · RUQ u/s distended gallbladder with air within stent in CBD similar to CT  · Surgery consulted  If concern for acute cholecystitis not surgical candidate would need per vanessa tube  · GI following  · ERCP 8/29/2022, Dr Jackie Fitzpatrick, Read Fus esophagus seen during EGD, biopsy obtained   ERCP performed with endoscopic mucosal resection of abnormal looking ampullary tissue, PD, CBD stent placed    · No s/s acute cholecystitis monitor     Acute diverticulitis  Assessment & Plan  · 2nd episode in one month (tx with 10 days antibx 8/2-8/11)  · CTAP acute diverticulitis  · GI following  · 7/25 Colonoscopy--2 polyps removed, Mild diverticula in the descending colon and sigmoid colon, Small, internal hemorrhoids  · 9/22 finished 10 day course of zosyn  · Questran started 9/17   · Imodium PRN  · Bentyl added 9/18 by GI  · advance diet per gi recommendations  · Gi suspects  multifactorial probably were related to the antibiotics that he is receiving for the diverticulitis as well as a chemo effect from his myeloma  May need to hold chemo on discharge for a period of time  Treatment at this point is supportive  · Monitor rectal tube output, Stool appears much darker today  · After discontinuation of Zosyn the following day on 09/23 patient started to develop leukocytosis with elevated procalcitonin  · Consulted Infectious Disease, recommend obtaining CT abdomen pelvis with p o  Contrast which is has been completed but official read is still pending  · Id recommended checking for C diff - pending  · 9/24 Started patient on p o  Vancomycin and Zosyn  · ID switched patient to [address] [place] [address]  Continue oral vancomycin    Anemia  Assessment & Plan  · Baseline hgb 8  · Receives IV venofer  · Trend H&H  · Transfuse for hgb <7  · Patient underwent EGD which showed One 3 mm polyp in the stomach removed with biopsy forceps  Large sliding hiatal hernia without Demian lesions     Biliary stents in place draining  Dilation of the stomach with large amount of retained   liquid upon entrance to the stomach   Suctioned out  Amber Correa for element of   gastroparesis      Ambulatory dysfunction  Assessment & Plan  · 2/2 L AKA  · PT/OT    Multiple myeloma ([address] )  Assessment & Plan  · Follows with Dr Seda Sun  · On Velcade and decadron last tx 9/6  · Free light chains ordered outpt f/u with oncology    Diabetes mellitus, type 2 [facility])  Assessment & Plan  Lab Results   Component Value Date    HGBA1C 6 3 (H) 08/05/2022       Recent Labs 09/25/22  1635 09/25/22  2049 09/26/22  0737 09/26/22  1200   POCGLU 177* 147* 188* 118       Blood Sugar Average: Last 72 hrs:  (P) 154 [id number]   · Hold lantus  · SSI    NALLELY (obstructive sleep apnea)  Assessment & Plan  · Refuses cpap    Chronic combined systolic and diastolic congestive heart failure (HCC)  Assessment & Plan  Wt Readings from Last 3 Encounters:   09/26/22 130 kg (285 lb 15 oz)   09/06/22 117 kg (257 lb 15 oz)   08/30/22 118 kg (260 lb)     · EF 45% decreased RV fx  · I/O  · Daily weight  · Currently being managed with HD  · Demadex discontinued due to low bp and minimal urine output        Morbid obesity ([address] )  Assessment & Plan  · Dietary education    Chronic atrial fibrillation ([address] )  Assessment & Plan  · 9/18 Bradycardia with Junctional escape beats 20's overnight with hypotension  · No rate control meds d/c in august due to bradycardia  · TSH 0 4  · Coumadin on hold due to ? Gi bleed  · PT INR  · Cards consulted recommending CPAP HS and occurs with apnea episodes         * DORA (acute kidney injury) ([facility] [address] )  Assessment & Plan  · DORA on CKD 3  · Most recent d/c creat 3-3 7 prior in 2 range  · CT no hydro, nonobstructing calculi  · UA neg  · Nephrology following  · 9/15 R tunneled HD cath placed  · 9/15 and 9/16 IHD  · Monitor for renal recovery remains oliguric  · S/p IHD on/ 9/19   · Currently on m/w/f schedule with no signs of renal recovery  · Temporary to PermCath conversion 09/21/2022  · No signs of renal recovery  · S/P HD on 9/23, next hd session on Monday  · Nephrology on board      Labs & Imaging: I have personally reviewed pertinent reports  VTE Prophylaxis: in place  Code Status:   Level 1 - Full Code    Patient Centered Rounds: I have performed bedside rounds with nursing staff today      Discussions with Specialists or Other Care Team Provider:  GI    Education and Discussions with Family / Patient:  Patient states he will update his family    Current Length of Stay: 14 day(s)    Current Patient Status: Inpatient   Certification Statement: The patient will continue to require additional inpatient hospital stay due to see my assessment and plan  Subjective:   Patient is seen and examined at bedside  Denies any abdominal pain, nausea, vomiting  Afebrile  All other ROS are negative  Objective:    Vitals: Blood pressure 96/51, pulse 69, temperature (!) 97 2 °F (36 2 °C), resp  rate 15, height 6' 3" (1 905 m), weight 130 kg (285 lb 15 oz), SpO2 90 %  ,Body mass index is 35 74 kg/m²  SPO2 RA Rest    Flowsheet Row ED to Hosp-Admission (Current) from 9/12/2022 in [address] Med Surg Unit   SpO2 90 %   SpO2 Activity At Rest   O2 Device Nasal cannula   O2 Flow Rate --        I&O:     Intake/Output Summary (Last 24 hours) at 9/26/2022 1312  Last data filed at 9/26/2022 1023  Gross per 24 hour   Intake 500 ml   Output 850 ml   Net -350 ml       Physical Exam:    General- Alert, lying comfortably in bed  Not in any acute distress  Neck- Supple, No JVD  CVS- regular, S1 and S2 normal  Chest- Bilateral Air entry, No rhochi, crackles or wheezing present  Abdomen- soft, obese, nontender, not distended, no guarding or rigidity, BS+  Extremities-  No pedal edema, No calf tenderness  CNS-   Alert, awake and orientedx3  No focal deficits present  Invasive Devices  Report    Central Venous Catheter Line  Duration           CVC Central Lines 09/22/22 Double 4 days          Peripheral Intravenous Line  Duration           Peripheral IV 09/24/22 Dorsal (posterior); Right Wrist 2 days          Hemodialysis Catheter  Duration           HD Permanent Double Catheter 4 days          Drain  Duration           Rectal Tube With balloon 10 days                      Social History  reviewed  Family History   Problem Relation Age of Onset    Other Mother         CARDIAC DISORDER     Diabetes Mother     Cancer Father     Other Family         CARDIAC DISORDER     Diabetes Family     Cancer Family     Mental illness Family     Kidney disease Sister     Diabetes Sister     reviewed    Meds:  Current Facility-Administered Medications   Medication Dose Route Frequency Provider Last Rate Last Admin    acetaminophen (TYLENOL) tablet 650 mg  650 mg Oral Q6H PRN Mirta Stubbs MD        albuterol (PROVENTIL HFA,VENTOLIN HFA) inhaler 2 puff  2 puff Inhalation Q6H PRN Mirta Stubbs MD        allopurinol (ZYLOPRIM) tablet 300 mg  300 mg Oral Daily Mirta Stubbs MD   300 mg at 09/26/22 1145    atorvastatin (LIPITOR) tablet 40 mg  40 mg Oral After Dariana Prasad MD   40 mg at 09/25/22 1722    calcium acetate (PHOSLO) capsule 667 mg  667 mg Oral TID With Meals Mirta Stubbs MD   667 mg at 09/26/22 1145    cholestyramine sugar free (QUESTRAN LIGHT) packet 4 g  4 g Oral BID Mirta Stubbs MD   4 g at 09/26/22 1145    clotrimazole (LOTRIMIN) 1 % cream   Topical BID Mirta Stubbs MD   Given at 09/26/22 1146    dicyclomine (BENTYL) capsule 10 mg  10 mg Oral 4x Daily (AC & HS) Mirta Stubbs MD   10 mg at 09/26/22 1206    fluticasone-vilanterol (BREO ELLIPTA) 200-25 MCG/INH inhaler 1 puff  1 puff Inhalation Daily Mirta Stubbs MD   1 puff at 09/26/22 1206    insulin lispro (HumaLOG) 100 units/mL subcutaneous injection 1-6 Units  1-6 Units Subcutaneous TID With Meals Mirta Stubbs MD   1 Units at 09/25/22 1722    iron sucrose (VENOFER) 100 mg in sodium chloride 0 9 % 100 mL IVPB  100 mg Intravenous Once per day on Mon Wed Fri Mirta Stubbs MD   100 mg at 09/26/22 1136    loperamide (IMODIUM) capsule 2 mg  2 mg Oral 4x Daily PRN Mirta Stubbs MD   2 mg at 09/23/22 1221    melatonin tablet 6 mg  6 mg Oral HS Mirta Stubbs MD   6 mg at 09/25/22 2102    meropenem (MERREM) 500 mg in sodium chloride 0 9 % 50 mL IVPB  500 mg Intravenous Q24H Mirta Stubbs MD        metoclopramide (REGLAN) injection 5 mg  5 mg Intravenous Q12H PRN Mirta Stubbs MD        midodrine (PROAMATINE) tablet 10 mg  10 mg Oral TID AC Mirta Stubbs MD   10 mg at 09/26/22 1206    ondansetron (ZOFRAN) injection 4 mg  4 mg Intravenous Q6H PRN Angelica Raya MD   4 mg at 09/26/22 1023    pantoprazole (PROTONIX) injection 40 mg  40 mg Intravenous Q12H [address], MD   40 mg at 09/26/22 1145    trimethobenzamide (TIGAN) IM injection 200 mg  200 mg Intramuscular Q6H PRN Angelica Raya MD   200 mg at 09/23/22 1004    vancomycin (VANCOCIN) capsule 125 mg  125 mg Oral BID Angelica Raya MD   125 mg at 09/26/22 1145      Medications Prior to Admission   Medication    acetaminophen (TYLENOL) 500 mg tablet    albuterol (PROVENTIL HFA,VENTOLIN HFA) 90 mcg/act inhaler    Alcohol Swabs (ALCOHOL PREP) 70 % PADS    allopurinol (ZYLOPRIM) 300 mg tablet    ammonium lactate (LAC-HYDRIN) 12 % lotion    atorvastatin (LIPITOR) 40 mg tablet    BD AUTOSHIELD DUO 30G X 5 MM MISC    BD INSULIN SYRINGE U/F 31G X 5/16" 0 5 ML MISC    BD PEN NEEDLE HILARIO U/F 32G X 4 MM MISC    bortezomib (VELCADE)    cholestyramine sugar free (QUESTRAN LIGHT) 4 g packet    clotrimazole (LOTRIMIN) 1 % cream    Easy Touch Safety Lancets 28G MISC    fluticasone (FLONASE) 50 mcg/act nasal spray    fluticasone-salmeterol (ADVAIR DISKUS, WIXELA INHUB) 250-50 mcg/dose inhaler    Insulin Glargine Solostar (Lantus SoloStar) 100 UNIT/ML SOPN    liraglutide (Victoza) injection    loperamide (IMODIUM) 2 mg capsule    multivitamin-minerals therapeutic (THERA-M)    NOVOLOG FLEXPEN 100 units/mL SOPN    nystatin (MYCOSTATIN) powder    omeprazole (PriLOSEC) 40 MG capsule    ondansetron (ZOFRAN) 4 mg tablet    potassium chloride (K-DUR,KLOR-CON) 20 mEq tablet    torsemide (DEMADEX) 20 mg tablet       Labs:  Results from last 7 days   Lab Units 09/26/22  0448 09/25/22  2320 09/25/22  0446   WBC Thousand/uL 9 95 10 69* 8 81   HEMOGLOBIN g/dL 8 2* 8 3* 8 5*   HEMATOCRIT % 26 9* 27 1* 27 8*   PLATELETS Thousands/uL 199 201 189   NEUTROS PCT %  --  78*  --    LYMPHS PCT %  --  10*  --    MONOS PCT %  --  9  --    EOS PCT % --  1  --      Results from last 7 days   Lab Units 09/26/22  0448 09/25/22  2320 09/25/22  0509 09/24/22  0446   POTASSIUM mmol/L  --  3 8 3 7 4 3   CHLORIDE mmol/L  --  96 98 98   CO2 mmol/L  --  26 28 26   BUN mg/dL  --  38* 31* 21   CREATININE mg/dL  --  7 69* 6 68* 4 92*   CALCIUM mg/dL  --  8 6 8 8 8 8   ALK PHOS U/L 129* 131*  --   --    ALT U/L 9* 8*  --   --    AST U/L 18 20  --   --      Lab Results   Component Value Date    TROPONINI 5 19 (H) 06/27/2020    TROPONINI 5 85 (H) 06/27/2020    TROPONINI 6 35 (H) 06/27/2020    CKMB 4 1 06/25/2020    CKTOTAL 271 06/25/2020    CKTOTAL 53 03/23/2018    CKTOTAL 55 10/08/2017     Results from last 7 days   Lab Units 09/25/22  0751 09/24/22  1026 09/23/22  0407   INR  2 04* 2 03* 1 86*     Lab Results   Component Value Date    BLOODCX Received in Microbiology Lab  Culture in Progress  09/25/2022    BLOODCX Received in Microbiology Lab  Culture in Progress  09/25/2022    BLOODCX No Growth After 5 Days  09/12/2022    BLOODCX No Growth After 5 Days  09/12/2022    URINECX >100,000 cfu/ml Escherichia coli (A) 12/05/2019    WOUNDCULT 4+ Growth of Proteus mirabilis (A) 07/25/2019    WOUNDCULT 4+ Growth of  07/25/2019    WOUNDCULT (A) 07/25/2019     4+ Growth of Methicillin Resistant Staphylococcus aureus    WOUNDCULT 1+ Growth of Proteus mirabilis (A) 07/25/2019    WOUNDCULT 4+ Growth of  07/25/2019    SPUTUMCULTUR 4+ Growth of  07/11/2020    SPUTUMCULTUR 1+ Growth of Aspergillus fumigatus (A) 07/11/2020         Imaging:  Results for orders placed during the hospital encounter of 09/12/22    XR chest portable    Narrative  CHEST    INDICATION:   infectious workup  COMPARISON:  08/04/2022    EXAM PERFORMED/VIEWS:  XR CHEST PORTABLE  Images: 2    FINDINGS:    Cardiomediastinal silhouette appears enlarged  The pulmonary vessels are normal     The entire left lung base is not included on the study  There is some atelectasis at the left lung base    No pneumothorax or pleural effusion  Osseous structures appear within normal limits for patient age  Impression  Limited study  There is some atelectasis at the left lung base  Workstation performed: [id number]    Results for orders placed during the hospital encounter of 10/04/21    XR chest pa & lateral    Narrative  CHEST    INDICATION:   follow up lung consolidations  COMPARISON:  CTA chest/abdomen/pelvis 10/4/2021  Chest radiograph 7/8/2020  EXAM PERFORMED/VIEWS:  XR CHEST PA & LATERAL      FINDINGS:    Heart shadow is enlarged but unchanged from prior exam     Again seen is dense left lower lobe/retrocardiac consolidation with ill-defined airspace opacities in the lingula and right lower lobe, similar to recent chest CT  Trace left pleural effusion  No pneumothorax  Osseous structures appear within normal limits for patient age  Impression  Stable left lower lobe/retrocardiac consolidation and lingular and right lower lobe ill-defined airspace opacities  Trace left pleural effusion          Workstation performed: [id number]      Last 24 Hours Medication List:   Current Facility-Administered Medications   Medication Dose Route Frequency Provider Last Rate    acetaminophen  650 mg Oral Q6H PRN Stephan Ellis MD      albuterol  2 puff Inhalation Q6H PRN Stephan Ellis MD      allopurinol  300 mg Oral Daily Stephan Ellis MD      atorvastatin  40 mg Oral After Kevin López MD      calcium acetate  667 mg Oral TID With Meals Stephan Ellis MD      cholestyramine sugar free  4 g Oral BID Stephan Ellis MD      clotrimazole   Topical BID Stephan Ellis MD      dicyclomine  10 mg Oral 4x Daily (AC & HS) Stephan Ellis MD      fluticasone-vilanterol  1 puff Inhalation Daily Stephan Ellis MD      insulin lispro  1-6 Units Subcutaneous TID With Meals Stephan Ellis MD      iron sucrose  100 mg Intravenous Once per day on Mon Wed Fri Stephan Ellis MD      loperamide  2 mg Oral 4x Daily PRN Stephan Ellis MD      melatonin  6 mg Oral HS Saintclair Skene, MD      meropenem  500 mg Intravenous Q24H Saintclair Skene, MD      metoclopramide  5 mg Intravenous Q12H PRN Saintclair Skene, MD      midodrine  10 mg Oral TID [facility] Saintclair Skene, MD      ondansetron  4 mg Intravenous Q6H PRN Saintclair Skene, MD      pantoprazole  40 mg Intravenous Q12H [address], MD      trimethobenzamide  200 mg Intramuscular Q6H PRN Saintclair Skene, MD      vancomycin  125 mg Oral BID Saintclair Skene, MD          Today, Patient Was Seen By: Jose Payton MD    ** Please Note: Dictation voice to text software may have been used in the creation of this document   **

## 2022-09-26 NOTE — ASSESSMENT & PLAN NOTE
· Follows with Dr Shannan May  · On Velcade and decadron last tx 9/6  · Free light chains ordered outpt f/u with oncology

## 2022-09-26 NOTE — ASSESSMENT & PLAN NOTE
· 2nd episode in one month (tx with 10 days antibx 8/2-8/11)  · CTAP acute diverticulitis  · GI following  · 7/25 Colonoscopy--2 polyps removed, Mild diverticula in the descending colon and sigmoid colon, Small, internal hemorrhoids  · 9/22 finished 10 day course of zosyn  · Questran started 9/17   · Imodium PRN  · Bentyl added 9/18 by GI  · advance diet per gi recommendations  · Gi suspects  multifactorial probably were related to the antibiotics that he is receiving for the diverticulitis as well as a chemo effect from his myeloma  May need to hold chemo on discharge for a period of time  Treatment at this point is supportive  · Monitor rectal tube output, Stool appears much darker today  · After discontinuation of Zosyn the following day on 09/23 patient started to develop leukocytosis with elevated procalcitonin  · Consulted Infectious Disease, recommend obtaining CT abdomen pelvis with p o  Contrast which is has been completed but official read is still pending  · Id recommended checking for C diff - pending  · 9/24 Started patient on p o  Vancomycin and Zosyn  · ID switched patient to [address] [place] [place] [address]    Continue oral vancomycin

## 2022-09-26 NOTE — ASSESSMENT & PLAN NOTE
Lab Results   Component Value Date    HGBA1C 6 3 (H) 08/05/2022       Recent Labs     09/25/22  1635 09/25/22  2049 09/26/22  0737 09/26/22  1200   POCGLU 177* 147* 188* 118       Blood Sugar Average: Last 72 hrs:  (P) 154 [id number]   · Hold lantus  · SSI

## 2022-09-26 NOTE — ANESTHESIA POSTPROCEDURE EVALUATION
Post-Op Assessment Note    CV Status:  Stable  Pain Score: 0    Pain management: adequate     Mental Status:  Sleepy (Sleepy, yet easily arousable )   Hydration Status:  Stable   PONV Controlled:  None   Airway Patency:  Patent      Post Op Vitals Reviewed: Yes      Staff: CRNA         No complications documented      BP 95/53 (09/26/22 1044)    Temp (!) 97 2 °F (36 2 °C) (09/26/22 1044)    Pulse 55 (09/26/22 1044)   Resp 16 (09/26/22 1044)    SpO2 97 % (09/26/22 1044)

## 2022-09-26 NOTE — ANESTHESIA PREPROCEDURE EVALUATION
Procedure:  EGD    Relevant Problems   CARDIO   (+) Chronic atrial fibrillation (HCC)   (+) Chronic combined systolic and diastolic congestive heart failure (HCC)   (+) Hyperlipidemia      ENDO   (+) Diabetes mellitus, type 2 (HCC)      GI/HEPATIC   (+) GERD (gastroesophageal reflux disease)   (+) Hiatal hernia      /RENAL   (+) DORA (acute kidney injury) ([facility] [address] )   (+) Hypertensive chronic kidney disease w stg 1-4/unsp chr kdny   (+) Stage 3b chronic kidney disease (HCC)      HEMATOLOGY   (+) Anemia   (+) Multiple myeloma (HCC)      MUSCULOSKELETAL   (+) Gout      NEURO/PSYCH   (+) Diabetes, polyneuropathy (HCC)      PULMONARY   (+) Chronic obstructive pulmonary disease, unspecified (HCC)   (+) Moderate persistent asthma without complication   (+) NALLELY (obstructive sleep apnea)        Physical Exam    Airway    Mallampati score: II  TM Distance: >3 FB  Neck ROM: full     Dental       Cardiovascular  Cardiovascular exam normal    Pulmonary  Pulmonary exam normal     Other Findings        Anesthesia Plan  ASA Score- 4     Anesthesia Type- general with ASA Monitors  Additional Monitors:   Airway Plan: ETT  Plan Factors-Exercise tolerance (METS): >4 METS  Chart reviewed  EKG reviewed  Imaging results reviewed  Existing labs reviewed  Patient summary reviewed  Patient is not a current smoker  Patient did not smoke on day of surgery  Obstructive sleep apnea risk education given perioperatively  Induction- intravenous  Postoperative Plan- Plan for postoperative opioid use  Planned trial extubation    Informed Consent- Anesthetic plan and risks discussed with patient  I personally reviewed this patient with the CRNA  Discussed and agreed on the Anesthesia Plan with the CRNA  Catia Ghosh

## 2022-09-26 NOTE — ASSESSMENT & PLAN NOTE
· Baseline hgb 8  · Receives IV venofer  · Trend H&H  · Transfuse for hgb <7  · Patient underwent EGD which showed One 3 mm polyp in the stomach removed with biopsy forceps  Large sliding hiatal hernia without Demian lesions     Biliary stents in place draining  Dilation of the stomach with large amount of retained   liquid upon entrance to the stomach   Suctioned out  Nisha Diaz for element of   gastroparesis

## 2022-09-26 NOTE — PLAN OF CARE
Problem: Potential for Falls  Goal: Patient will remain free of falls  Description: INTERVENTIONS:  - Educate patient/family on patient safety including physical limitations  - Instruct patient to call for assistance with activity   - Consult OT/PT to assist with strengthening/mobility   - Keep Call bell within reach  - Keep bed low and locked with side rails adjusted as appropriate  - Keep care items and personal belongings within reach  - Initiate and maintain comfort rounds  - Make Fall Risk Sign visible to staff  - Offer Toileting every 2 Hours, in advance of need  - Initiate/Maintain bed alarm  - Obtain necessary fall risk management equipment:   - Apply yellow socks and bracelet for high fall risk patients  - Consider moving patient to room near nurses station  Outcome: Progressing     Problem: Nutrition/Hydration-ADULT  Goal: Nutrient/Hydration intake appropriate for improving, restoring or maintaining nutritional needs  Description: Monitor and assess patient's nutrition/hydration status for malnutrition  Collaborate with interdisciplinary team and initiate plan and interventions as ordered  Monitor patient's weight and dietary intake as ordered or per policy  Utilize nutrition screening tool and intervene as necessary  Determine patient's food preferences and provide high-protein, high-caloric foods as appropriate       INTERVENTIONS:  - Monitor oral intake, urinary output, labs, and treatment plans  - Assess nutrition and hydration status and recommend course of action  - Evaluate amount of meals eaten  - Assist patient with eating if necessary   - Allow adequate time for meals  - Recommend/ encourage appropriate diets, oral nutritional supplements, and vitamin/mineral supplements  - Order, calculate, and assess calorie counts as needed  - Recommend, monitor, and adjust tube feedings and TPN/PPN based on assessed needs  - Assess need for intravenous fluids  - Provide specific nutrition/hydration education as appropriate  - Include patient/family/caregiver in decisions related to nutrition  Outcome: Progressing     Problem: GASTROINTESTINAL - ADULT  Goal: Maintains adequate nutritional intake  Description: INTERVENTIONS:  - Monitor percentage of each meal consumed  - Identify factors contributing to decreased intake, treat as appropriate  - Assist with meals as needed  - Monitor I&O, weight, and lab values if indicated  - Obtain nutrition services referral as needed  Outcome: Progressing

## 2022-09-26 NOTE — ANESTHESIA PREPROCEDURE EVALUATION
Procedure:  EGD    Relevant Problems   CARDIO   (+) Chronic atrial fibrillation (HCC)   (+) Chronic combined systolic and diastolic congestive heart failure (HCC)   (+) Hyperlipidemia      ENDO   (+) Diabetes mellitus, type 2 (HCC)      GI/HEPATIC   (+) GERD (gastroesophageal reflux disease)   (+) Hiatal hernia      /RENAL   (+) DORA (acute kidney injury) ([facility] [address] )   (+) Hypertensive chronic kidney disease w stg 1-4/unsp chr kdny   (+) Stage 3b chronic kidney disease (HCC)      HEMATOLOGY   (+) Anemia   (+) Multiple myeloma (HCC)      MUSCULOSKELETAL   (+) Gout      NEURO/PSYCH   (+) Diabetes, polyneuropathy (HCC)      PULMONARY   (+) Chronic obstructive pulmonary disease, unspecified (HCC)   (+) Moderate persistent asthma without complication   (+) NALLELY (obstructive sleep apnea)        Physical Exam    Airway    Mallampati score: II  TM Distance: >3 FB  Neck ROM: full     Dental       Cardiovascular  Cardiovascular exam normal    Pulmonary  Pulmonary exam normal     Other Findings        Anesthesia Plan  ASA Score- 4     Anesthesia Type- general with ASA Monitors  Additional Monitors:   Airway Plan: ETT  Plan Factors-Exercise tolerance (METS): <4 METS  Chart reviewed  EKG reviewed  Imaging results reviewed  Existing labs reviewed  Patient summary reviewed  Induction- intravenous  Postoperative Plan- Plan for postoperative opioid use  Planned trial extubation    Informed Consent- Anesthetic plan and risks discussed with patient  I personally reviewed this patient with the CRNA  Discussed and agreed on the Anesthesia Plan with the CRNA  Javed Reaves

## 2022-09-26 NOTE — ANESTHESIA POSTPROCEDURE EVALUATION
Post-Op Assessment Note    CV Status:  Stable  Pain Score: 0    Pain management: adequate     Mental Status:  Sleepy (Sleepy, yet easily arousable )      Post Op Vitals Reviewed: Yes      Staff: CRNA         No complications documented      BP 95/53 (09/26/22 1049)    Temp (!) 97 2 °F (36 2 °C) (09/26/22 1049)    Pulse 55 (09/26/22 1049)   Resp 16 (09/26/22 1049)    SpO2 97 % (09/26/22 1049)

## 2022-09-26 NOTE — PROGRESS NOTES
Progress Note - General Surgery   Sudheer Delgado 81 y o  male MRN: [id number]  Unit/Bed#: -01 Encounter: [id number]    Assessment/Plan:  Ask for re-evaluation in light of new CT scan findings    Gallbladder distention with cholelithiasis, pneumobilia and air ingallbladder  -pneumobilia unchanged on CT scan  -gallbladder remains distended with wall thickening and pericholecystic inflammatory changes, with calculi extending into the cystic duct, again consideration for acute cholecystitis  -right upper quadrant ultrasound with distended gallbladder and wall thickening to 15 mm with intraluminal air gallstones and sludge  -findings consistent with acute cholecystitis, however patient denies any right upper quadrant pain and was able to tolerated diet prior to being NPO today for EGD  -would continue management with IV antibiotics  -if patient becomes symptomatic or clinical concern for acute cholecystitis continues can consider cholecystostomy tube placement    Patient is not a surgical candidate for cholecystectomy at this time    Leukocytosis  -improving  -continue antibiotics per ID  -continue to monitor    Diverticulitis/colitis  -resolved on follow-up CT scan    Large hiatal hernia  -known history of hiatal hernia  -CT with distended stomach with ingested contents as well as distension in the 1st 2 portions of the duodenum  -no findings of gastric outlet obstruction on CT scan  -EGD today with findings of large hiatal hernia without Demian lesions and findings of 1 3 mm polyp in the stomach  -no signs of obstruction or bleeding, no indication for immediate surgical intervention  -patient is not a candidate for surgical intervention at this time  -ADAT    DORA ond CKD now on HD, chronic AFIB, Multiple myeloma, CHF, GERD, COPD, NALLELY, DM2, Anemia, morbid obesity  -continue medical, nephrology management       Subjective/Objective   Chief Complaint:  Vomiting    Subjective:  Last episode of vomiting at 2:00 a  m   Patient denies any current abdominal pain  No right upper quadrant pain  Was tolerating diet a few days ago  No fevers or chills  Objective:     Blood pressure (!) 99/46, pulse 64, temperature 98 1 °F (36 7 °C), temperature source Temporal, resp  rate 18, height 6' 3" (1 905 m), weight 130 kg (285 lb 15 oz), SpO2 (!) 87 %  ,Body mass index is 35 74 kg/m²  Intake/Output Summary (Last 24 hours) at 9/26/2022 1536  Last data filed at 9/26/2022 1400  Gross per 24 hour   Intake 922 ml   Output 850 ml   Net 72 ml       Invasive Devices  Report    Central Venous Catheter Line  Duration           CVC Central Lines 09/22/22 Double 4 days          Peripheral Intravenous Line  Duration           Peripheral IV 09/24/22 Dorsal (posterior); Right Wrist 2 days          Hemodialysis Catheter  Duration           HD Permanent Double Catheter 4 days          Drain  Duration           Rectal Tube With balloon 11 days                Physical Exam:   General appearance: alert and oriented, in no acute distress  Head: Normocephalic, without obvious abnormality, atraumatic, sclerae anicteric, mucous membranes moist  Neck: no JVD and supple, symmetrical, trachea midline  Lungs: clear to auscultation, no wheezes or rales  Heart:   Regular rate and rhythm, S1-S2 normal, no murmur  Abdomen:   Obese, soft, nontender, few bowel sounds  Extremities:   No edema, redness or tenderness in the calves or thighs  Skin: Warm, dry  Nursing notes and vital signs reviewed      Lab, Imaging and other studies:  I have personally reviewed pertinent lab results    , CBC:   Lab Results   Component Value Date    WBC 9 95 09/26/2022    HGB 8 2 (L) 09/26/2022    HCT 26 9 (L) 09/26/2022    MCV 85 09/26/2022     09/26/2022    MCH 25 9 (L) 09/26/2022    MCHC 30 5 (L) 09/26/2022    RDW 17 5 (H) 09/26/2022    MPV 10 2 09/26/2022    NRBC 0 09/25/2022   , CMP:   Lab Results   Component Value Date    SODIUM 135 09/25/2022    K 3 8 09/25/2022    CL 96 09/25/2022    CO2 26 09/25/2022    BUN 38 (H) 09/25/2022    CREATININE 7 69 (H) 09/25/2022    CALCIUM 8 6 09/25/2022    AST 18 09/26/2022    ALT 9 (L) 09/26/2022    ALKPHOS 129 (H) 09/26/2022    EGFR 6 09/25/2022     VTE Pharmacologic Prophylaxis: Heparin  VTE Mechanical Prophylaxis: sequential compression device     Mary Sánchez PA-C

## 2022-09-26 NOTE — CONSULTS
Consultation - Infectious Disease   Reji Savage 81 y o  male MRN: [id number]  Unit/Bed#: -01 Encounter: [id number]      Assessment/Plan   1  Sigmoid diverticulitis/Presumed cholecystitis/Fever/Leukocytosis/  Diarrhea/DORA on CKD: Pt presented with weakness, right-sided abd pain, and N/V/D  On admission, he did not have fever WBC count was 10 2K  Creatinine was elevated at 8 4 c/w DORA on CKD  Abd CT showed decreased sigmoid diverticulitis and possible cholecystitis  He was started on Zosyn for tx of diverticulitis  LFT's were nml  C diff and enteric stool panel were neg, Dialysis was started on 9/16  He finished course of Zosyn on 9/22  He developed low grade fever on 9/22  N/V recurred on 9/23  He conts to have diarrhea requiring rectal tube  WBC count increased to 11 6K on 9/24 and procalcitonin was elevated at 6 39  Zosyn was restarted and Pt was placed on oral Vanco  Repeat C diff was neg  Abd CT was done earlier today to re-eval sigmoid diverticulitis and presumed cholecystitis   No further low grade fever and WBC count has decreased to 8 8K but procalcitonin increased slightly to 7 79     - Cont Zosyn for now while results of abd CT are still pending  - Cont oral Vanco but will decrease frequency to Q12H for C diff prophylaxis since Pt may require further abx tx  - Awaiting results of Abd CT  - If there is evidence of cholecystitis, would have IR place cholecystotomy tube  - Cont tx of diarrhea as per GI - ?due to abx tx or possibly microscopic colitis  - Cont dialysis as per Renal for DORA on CKD - will adjust abx doses based on creatinine clearance  - Will monitor for the development of toxicity to current abx tx    Discussed case with Dr Molly Jackman on 9/24 when consult was requested        History of Present Illness   Physician Requesting Consult: Molly Jackman DO  Reason for Consult / Principal Problem: Abd pain, N/V/D    HPI: Reji Savage is a 81y o  year old male with sigmoid diverticulitis, 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, CKD, COPD, DM, MM on chemo, CHF, A fib, obesity, and NALLELY who was admitted on 9/12 with c/o weakness,  right-sided abd pain, N/V/D x several days  He also had abnml labs drawn at the SNF    Pt was hospitalized at [facility] from 8/1 to 8/10 for tx of diverticulitis  He was tx'd with Rocephin with Flagyl  He had colonoscopy PTA on 7/25 which did not show any diverticulitis  ERCP was done for ampullary adenoma with sphincterotomy and CBD stent placement  On admission, he did not have fever and WBC count was 10 2K  Creatinine was 8 4  I reviewed admission CXR which showed left base atelectasis  Abd CT showed improving sigmoid diverticulitis and possible cholecystitis  C diff and enteric stool panel were neg  He was placed on Zosyn  Temporary dialysis catheter was placed on 9/15  Dialysis was started on 9/16  He finished Zosyn on 9/22  Permanent dialysis cathetetr was placed on 9/22 and Pt had low grade fever of 100 4  He developed N/V on 9/23  WBC count increased to 11 6K and procalcitonin increased to 6 39 on 9/24  He continued to have diarrhea requiring rectal tube  Zosyn was restarted and Pt was also placed on oral Vanco  Repeat Abd CT was done earlier today and results are pending  Repeat C diff is neg  WBC count has decreased to 8 8K but procalcitonin increased slightly to 7 79  Pt conts to have abd pain    He denied cough, SOB  CP, or dysuria        Inpatient consult to Infectious Diseases  Consult performed by: Vini Elias MD  Consult ordered by: Bessie Liu DO          ROS: See HPI  14 systems reviewed, remainder is neg      Historical Information   Past Medical History:   Diagnosis Date    Cancer [facility])     CHF (congestive heart failure) (HCC)     Chronic kidney disease     COPD (chronic obstructive [facility] disease) ([facility] [address] )     COVID-19     Decubitus ulcer of heel     LAST ASSESSED [id number]    Diabetes mellitus ([facility] [address] )  History of varicose veins     Hypertension     Intermittent claudication (HCC)     Neuropathy     Seasonal allergies      Past Surgical History:   Procedure Laterality Date    AMPUTATION ABOVE KNEE (AKA) Left 7/31/2019    Procedure: AMPUTATION ABOVE KNEE (AKA);   Surgeon: Kenia Ambriz MD;  Location:  MAIN OR;  Service: General    ANGIOPLASTY      W/ FLUOROSC ANGIOGRAPGY PERIPHERAL ARTERY ADDITIONAL  LAST ASSESSED [id number]    ANGIOPLASTY / STENTING FEMORAL      TANSCATH INTRAVASCULAR STENT PLACEMENT PERCUTANEOUS FEMORAL     COLONOSCOPY  2010    CT BONE MARROW BIOPSY AND ASPIRATION  8/9/2019    FULL THICKNESS SKIN GRAFT      IR BIOPSY BONE MARROW  9/8/2022    IR TEMPORARY DIALYSIS CATHETER PLACEMENT  9/15/2022    IR TUNNELED DIALYSIS CATHETER PLACEMENT  9/22/2022    TENDON REPAIR      TOE AMPUTATION Left 12/27/2018    Procedure: AMPUTATION left 4th TOE;  Surgeon: Wendy Gonzalez DPM;  Location:  MAIN OR;  Service: Podiatry     Social History   Social History     Substance and Sexual Activity   Alcohol Use Not Currently     Social History     Substance and Sexual Activity   Drug Use Not Currently     Social History     Tobacco Use   Smoking Status Never Smoker   Smokeless Tobacco Never Used     Family History   Problem Relation Age of Onset    Other Mother         CARDIAC DISORDER     Diabetes Mother     Cancer Father     Other Family         CARDIAC DISORDER     Diabetes Family     Cancer Family     Mental illness Family     Kidney disease Sister     Diabetes Sister        Meds/Allergies   MEDS:  Zosyn: #2  Oral Vanco; #2      Completed:  Zosyn x 11 days on 9/22      Current Facility-Administered Medications:     acetaminophen (TYLENOL) tablet 650 mg, 650 mg, Oral, Q6H PRN, Lolis Foote PA-C    albuterol (PROVENTIL HFA,VENTOLIN HFA) inhaler 2 puff, 2 puff, Inhalation, Q6H PRN, Virginia Patiño PA-C    allopurinol (ZYLOPRIM) tablet 300 mg, 300 mg, Oral, Daily, Virginia Patiño PA-C, 300 mg at 09/25/22 0844    atorvastatin (LIPITOR) tablet 40 mg, 40 mg, Oral, After Dinner, Cami Downing PA-C, 40 mg at 09/25/22 1722    calcium acetate (PHOSLO) capsule 667 mg, 667 mg, Oral, TID With Meals, NICOLA Oliva, 667 mg at 09/25/22 1722    cholestyramine sugar free (QUESTRAN LIGHT) packet 4 g, 4 g, Oral, BID, Lolis Foote PA-C, 4 g at 09/25/22 1722    clotrimazole (LOTRIMIN) 1 % cream, , Topical, BID, Cami Downing PA-C, Given at 09/25/22 1723    dicyclomine (BENTYL) capsule 10 mg, 10 mg, Oral, 4x Daily (AC & HS), Lolis Foote PA-C, 10 mg at 09/25/22 1722    fentanyl citrate (PF) 100 MCG/2ML, , Intravenous, Code/Trauma/Sedation Med, Aliene Lennox, MD, 25 mcg at 09/22/22 0853    fluticasone-vilanterol (BREO ELLIPTA) 200-25 MCG/INH inhaler 1 puff, 1 puff, Inhalation, Daily, Lolis Foote PA-C, 1 puff at 09/25/22 0844    insulin lispro (HumaLOG) 100 units/mL subcutaneous injection 1-6 Units, 1-6 Units, Subcutaneous, TID With Meals, 1 Units at 09/25/22 1722 **AND** Fingerstick Glucose (POCT), , , 4x Daily AC and at bedtime, Cami Downing PA-C    [START ON 9/26/2022] iron sucrose (VENOFER) 100 mg in sodium chloride 0 9 % 100 mL IVPB, 100 mg, Intravenous, Once per day on Mon Wed Fri, Lobo Hart MD    loperamide (IMODIUM) capsule 2 mg, 2 mg, Oral, 4x Daily PRN, Cami Downing PA-C, 2 mg at 09/23/22 1221    melatonin tablet 6 mg, 6 mg, Oral, HS, Lolis Foote PA-C, 6 mg at 09/24/22 2138    midazolam (VERSED) injection, , , Code/Trauma/Sedation Med, Aliene Lennox, MD, 0 5 mg at 09/22/22 0853    midodrine (PROAMATINE) tablet 10 mg, 10 mg, Oral, TID AC, Lobo Hart MD, 10 mg at 09/25/22 1722    ondansetron (ZOFRAN) injection 4 mg, 4 mg, Intravenous, Q6H PRN, Mary Pineda DO    pantoprazole (PROTONIX) injection 40 mg, 40 mg, Intravenous, Q12H [facility], NICOLA Rae, 40 mg at 09/25/22 0844    piperacillin-tazobactam (ZOSYN) 2 25 g in sodium chloride 0 9 % 50 mL IVPB, 2 25 g, Intravenous, Q6H, Mary Pineda DO, Last Rate: 100 mL/hr at 09/25/22 1722, 2 25 g at 09/25/22 1722    trimethobenzamide (TIGAN) IM injection 200 mg, 200 mg, Intramuscular, Q6H PRN, Mary Pineda DO, 200 mg at 09/23/22 1004    vancomycin (VANCOCIN) capsule 125 mg, 125 mg, Oral, 4x Daily, Mary Pineda DO, 125 mg at 09/25/22 1722    Allergies   Allergen Reactions    Latex Rash         Intake/Output Summary (Last 24 hours) at 9/25/2022 2007  Last data filed at 9/25/2022 1300  Gross per 24 hour   Intake 560 ml   Output 1050 ml   Net -490 ml       PE:  WD, WN, WM who had right-sided abd pain and diarrhea with rectal tube  VSS, Tmax: 99 6  HEENT:  No scleral icterus, pharynx clear  NECK:  Supple  CARDIAC:  RRR, nl S1, S2  LUNGS:  Clear anteriorly  ABDOMEN:  +BS, soft, +discomfort with palpation on the right  MUSCULOSKELETAL:  No right calf tenderness  EXTREMITIES:  +Left AKA  SKIN:  No rash  NEURO:   Grossly nonfocal    Invasive Devices:   CVC Central Lines 09/22/22 Double (Active)       Peripheral IV 09/24/22 Dorsal (posterior); Right Wrist (Active)   Site Assessment WDL 09/25/22 0900   Dressing Type Transparent 09/25/22 0900   Line Status Flushed;Saline locked 09/25/22 0900   Dressing Status Clean;Dry; Intact 09/25/22 0900       Rectal Tube With balloon (Active)   Rectal Tube Output 450 mL 09/24/22 2229       HD Permanent Double Catheter (Active)   Reasons to continue HD Cath Treatment Therapy 09/23/22 0830   Goal for Removal N/A- chronic HD catheter 09/23/22 0830   Line Necessity Reviewed Yes, reviewed with provider 09/23/22 0830   Site Assessment WDL 09/23/22 0830   Proximal Lumen Status Flushed 09/23/22 1145   Medial Lumen Status Flushed 09/23/22 1145   Dressing Type Chlorhexidine dressing 09/23/22 0830   Dressing Change Due 09/29/22 09/23/22 0830           Lab Results:   No results displayed because visit has over 200 results  Imaging Studies: I have personally reviewed pertinent reports      EKG, Pathology, and Other Studies: I have personally reviewed pertinent reports  Culture  Lab Results   Component Value Date    BLOODCX No Growth After 5 Days  09/12/2022    BLOODCX No Growth After 5 Days  09/12/2022    BLOODCX No Growth After 5 Days  10/04/2021    BLOODCX No Growth After 5 Days  10/04/2021    BLOODCX No Growth After 5 Days  06/25/2020    BLOODCX No Growth After 5 Days  06/25/2020    BLOODCX Staphylococcus coagulase negative (A) 12/04/2019    BLOODCX No Growth After 5 Days  12/04/2019    BLOODCX No Growth After 5 Days  10/29/2019    BLOODCX No Growth After 5 Days  10/29/2019    BLOODCX No Growth After 5 Days  07/25/2019    BLOODCX No Growth After 5 Days  07/25/2019    BLOODCX No Growth After 5 Days  12/25/2018    BLOODCX No Growth After 5 Days  12/25/2018    BLOODCX No Growth After 5 Days  03/23/2018    BLOODCX No Growth After 5 Days  03/23/2018    BLOODCX No Growth After 5 Days  10/08/2017    BLOODCX No Growth After 5 Days  10/08/2017    BLOODCX No Growth After 5 Days  09/15/2016    BLOODCX No Growth After 5 Days   09/15/2016     Lab Results   Component Value Date    WOUNDCULT 4+ Growth of Proteus mirabilis (A) 07/25/2019    WOUNDCULT 4+ Growth of  07/25/2019    WOUNDCULT (A) 07/25/2019     4+ Growth of Methicillin Resistant Staphylococcus aureus    WOUNDCULT 1+ Growth of Proteus mirabilis (A) 07/25/2019    WOUNDCULT 4+ Growth of  07/25/2019    WOUNDCULT 3+ Growth of Staphylococcus aureus (A) 12/25/2018    WOUNDCULT 2+ Growth of  12/25/2018    WOUNDCULT 4+ Growth of Staphylococcus aureus 09/15/2016    WOUNDCULT 4+ Growth of Proteus mirabilis 09/15/2016    WOUNDCULT 4+ Growth of Mixed Skin Nini 09/15/2016     Lab Results   Component Value Date    URINECX >100,000 cfu/ml Escherichia coli (A) 12/05/2019     Lab Results   Component Value Date    SPUTUMCULTUR 4+ Growth of  07/11/2020    SPUTUMCULTUR 1+ Growth of Aspergillus fumigatus (A) 07/11/2020       Principal Problem:    DORA (acute kidney injury) ([facility] [address] )  Active Problems: Chronic atrial fibrillation (HCC)    Morbid obesity (HCC)    Chronic combined systolic and diastolic congestive heart failure (HCC)    NALLELY (obstructive sleep apnea)    Diabetes mellitus, type 2 (HCC)    Multiple myeloma (HCC)    Ambulatory dysfunction    Anemia    Acute diverticulitis    Abnormal CT scan    Nausea and vomiting

## 2022-09-26 NOTE — ASSESSMENT & PLAN NOTE
· DORA on CKD 3  · Most recent d/c creat 3-3 7 prior in 2 range  · CT no hydro, nonobstructing calculi  · UA neg  · Nephrology following  · 9/15 R tunneled HD cath placed  · 9/15 and 9/16 IHD  · Monitor for renal recovery remains oliguric  · S/p IHD on/ 9/19   · Currently on m/w/f schedule with no signs of renal recovery  · Temporary to PermCath conversion 09/21/2022  · No signs of renal recovery  · S/P HD on 9/23, next hd session on Monday  · Nephrology on board

## 2022-09-26 NOTE — QUICK NOTE
IMPRESSION:  One 3 mm polyp in the stomach removed with biopsy forceps  Large sliding hiatal hernia without Demian lesions  Biliary stents in place draining  Dilation of the stomach with large amount of retained liquid upon entrance to the stomach  Suctioned out    Concern for element of gastroparesis      RECOMMENDATION:  Return to floors  Will need outpatient evaluation with gastric emptying scan  Small frequent meals  Trial reglan  Continue PPI

## 2022-09-26 NOTE — QUICK NOTE
Pt seen at bedside due to follow-up labs showing lactic acid at 2 8 and procalcitonin increased at 9 34 (from 7 79 in AM)  CXR on my review with questionable infiltrate in the right lower lobe, however pt denies any respiratory complaints including cough or dyspnea  He has been on zosyn x48 hours with prior 6 day course with only one day of not receiving abx  As patient without respiratory complaints, will hold on changing antibiotics at this time  Lactic acid improved to 1 5 s/p 250 cc bolus  ID has been consulted on this patient - awaiting formal recommendations at this time  Of note, at time of my evaluation pt NOT meeting sepsis criteria as only SIRS criteria met is fever of 101 7F at 1556

## 2022-09-26 NOTE — PROGRESS NOTES
[facility] PROGRESS NOTE   Bay Seen 81 y o  male MRN: [id number]  Unit/Bed#: -01 Encounter: [id number]  Reason for Consult:  Acute kidney injury on CKD    ASSESSMENT and PLAN:  59-year-old male with history of multiple myeloma treated with Velcade, CKD, CHF, ejection fraction 45% presenting with acute diverticulitis  Nephrology following for management of acute kidney injury    Acute kidney injury (POA) on CKD stage IIIB:  · Baseline creatinine 1 2-1 4  · Underlying CKD due to diabetic nephropathy and hypertensive nephrosclerosis, cardiorenal syndrome and multiple myeloma  · Acute kidney injury etiology:  Pre renal azotemia/large volume GI output which likely converted to ATN  Severe azotemia and oliguria, recent DORA  · Started on hemodialysis 9/16  · Tunneled catheter placed on 09/22/2022  · Currently on a Monday, Wednesday, Friday hemodialysis schedule  · Plan:  · Hemodialysis this afternoon  · No evidence of renal recovery  · Monitor closely, avoid nephrotoxic agents, hypotension    Blood pressure/volume status:  · Midodrine 10 mg t i d  added to assist with volume removal/blood pressure maintenance on hemodialysis    Acute diverticulitis:  · Large volume stool output, fecal management system in place  · C diff negative  · Management per primary team  · On Questran    Distended gallbladder with cholelithiasis:  · Right upper quadrant ultrasound:  Gallbladder distended, gallstones, sludge noted  Liver moderately enlarged  Chronic systolic and diastolic combined CHF:  · Ejection fraction 45%, concentric hypertrophy  Diffuse hypokinesis with regional variations  · Volume removal on hemodialysis    Anemia:  · Hemoglobin 8 2  · Iron deficient:  Iron saturation 10%    On Venofer 100 mg with each hemodialysis treatment times 10 doses  · Transfuse for hemoglobin less than 7    Multiple myeloma:  · On Velcade and Decadron  · Follows with Dr Kirsten HARGROVED:  · Hyperphosphatemia:  On PhosLo    Other:  · Chronic atrial fibrillation  · Hiatal hernia:  EGD today  No Demian lesions noted  3 mm polyp removed  Biliary stents in place draining  Concern for gastroparesis  · Morbid obesity  · Hyperlipidemia  · COPD  · Gout  · Diabetes mellitus type 2    DISPOSITION:  Hemodialysis today    SUBJECTIVE / 24H INTERVAL HISTORY:  No acute complaints  Very drowsy after EGD    OBJECTIVE:  Current Weight: Weight - Scale: 130 kg (285 lb 15 oz)  Vitals:    09/26/22 1040 09/26/22 1044 09/26/22 1049 09/26/22 1100   BP: 93/53 95/53 95/53 94/52   Pulse: 70 55 55 60   Resp: 20 16 16 (!) 28   Temp: 98 2 °F (36 8 °C) (!) 97 2 °F (36 2 °C) (!) 97 2 °F (36 2 °C)    TempSrc:  Skin Skin    SpO2: 98% 97% 97% 99%   Weight:       Height:           Intake/Output Summary (Last 24 hours) at 9/26/2022 1115  Last data filed at 9/26/2022 1023  Gross per 24 hour   Intake 780 ml   Output 1050 ml   Net -270 ml     General:  Resting quietly and comfortably in bed  Skin:  Slightly pale appearing skin warm and dry  Eyes:  Sclera clear  ENT:  Oropharynx appears moist  Neck:  Normal range of motion  Chest:  Chest clear anteriorly  Normal effort  CVS:  Normal heart rate, regular rhythm  Abdomen:  Abdomen protuberant, slightly tender  Normal bowel sounds    Extremities:  Mild  Lower extremity edema, left AKA  Neuro:  No acute deficits  Psych:  Normal behaviors  Medications:    Current Facility-Administered Medications:     acetaminophen (TYLENOL) tablet 650 mg, 650 mg, Oral, Q6H PRN, Ramin Mercedes PA-C    albuterol (PROVENTIL HFA,VENTOLIN HFA) inhaler 2 puff, 2 puff, Inhalation, Q6H PRN, Ramin Mercedes PA-C    albuterol inhalation solution 2 5 mg, 2 5 mg, Nebulization, Once PRN, Yoli Tenorio MD    allopurinol (ZYLOPRIM) tablet 300 mg, 300 mg, Oral, Daily, Lolis Foote PA-C, 300 mg at 09/25/22 0844    atorvastatin (LIPITOR) tablet 40 mg, 40 mg, Oral, After Dinner, Ramin Mercedes PA-C, 40 mg at 09/25/22 1722    calcium acetate (PHOSLO) capsule 667 mg, 667 mg, Oral, TID With Meals, NICOLA Mak, 667 mg at 09/25/22 1722    cholestyramine sugar free (QUESTRAN LIGHT) packet 4 g, 4 g, Oral, BID, Lolis Foote PA-C, 4 g at 09/25/22 1722    clotrimazole (LOTRIMIN) 1 % cream, , Topical, BID, Bella Smith PA-C, Given at 09/25/22 1723    dicyclomine (BENTYL) capsule 10 mg, 10 mg, Oral, 4x Daily (AC & HS), Lolis Foote PA-C, 10 mg at 09/26/22 0553    fentaNYL (SUBLIMAZE) injection 25 mcg, 25 mcg, Intravenous, Q5 Min PRN, Melly Mckay MD    fluticasone-vilanterol (BREO ELLIPTA) 200-25 MCG/INH inhaler 1 puff, 1 puff, Inhalation, Daily, Bella Smith PA-C, 1 puff at 09/25/22 0844    HYDROmorphone (DILAUDID) injection 0 5 mg, 0 5 mg, Intravenous, Q10 Min PRN, Melly Mckay MD    insulin lispro (HumaLOG) 100 units/mL subcutaneous injection 1-6 Units, 1-6 Units, Subcutaneous, TID With Meals, 1 Units at 09/25/22 1722 **AND** Fingerstick Glucose (POCT), , , 4x Daily AC and at bedtime, Bella Smith PA-C    iron sucrose (VENOFER) 100 mg in sodium chloride 0 9 % 100 mL IVPB, 100 mg, Intravenous, Once per day on Mon Wed Fri, Mojgan Clayton MD    loperamide (IMODIUM) capsule 2 mg, 2 mg, Oral, 4x Daily PRN, Bella Smith PA-C, 2 mg at 09/23/22 1221    melatonin tablet 6 mg, 6 mg, Oral, HS, Lolis Foote PA-C, 6 mg at 09/25/22 2102    meropenem (MERREM) 500 mg in sodium chloride 0 9 % 50 mL IVPB, 500 mg, Intravenous, Q24H, Narinder Sterling MD    metoclopramide (REGLAN) injection 5 mg, 5 mg, Intravenous, Q12H PRN, Arleen Werner MD    midodrine (PROAMATINE) tablet 10 mg, 10 mg, Oral, TID AC, Mojgan Clayton MD, 10 mg at 09/26/22 0554    ondansetron (ZOFRAN) injection 4 mg, 4 mg, Intravenous, Q6H PRN, Mary Pineda DO, 4 mg at 09/26/22 1023    ondansetron (ZOFRAN) injection 4 mg, 4 mg, Intravenous, Once PRN, Melly Mckay MD    pantoprazole (PROTONIX) injection 40 mg, 40 mg, Intravenous, Q12H [facility], NICOLA Rae, 40 mg at 09/25/22 2103    trimethobenzamide (TIGAN) IM injection 200 mg, 200 mg, Intramuscular, Q6H PRN, Mary Pineda DO, 200 mg at 09/23/22 1004    vancomycin (VANCOCIN) capsule 125 mg, 125 mg, Oral, BID, Kedar Black MD    Laboratory Results:  Results from last 7 days   Lab Units 09/26/22  0448 09/25/22  2320 09/25/22  0509 09/25/22  0446 09/24/22  0446 09/23/22  1132 09/23/22  0407 09/22/22  0449 09/21/22  0518 09/21/22  0518 09/20/22  0422   WBC Thousand/uL 9 95 10 69*  --  8 81 11 63*  --  10 21* 6 70  --  4 93  --    HEMOGLOBIN g/dL 8 2* 8 3*  --  8 5* 8 5* 10 0* 9 3* 8 9*   < > 8 1*  --    HEMATOCRIT % 26 9* 27 1*  --  27 8* 28 1* 32 1* 29 9* 29 6*   < > 26 8*  --    PLATELETS Thousands/uL 199 201  --  189 163  --  208 225  --  225  --    POTASSIUM mmol/L  --  3 8 3 7  --  4 3  --  3 8 3 8  --  3 6 3 6   CHLORIDE mmol/L  --  96 98  --  98  --  98 101  --  102 103   CO2 mmol/L  --  26 28  --  26  --  27 25  --  23 26   BUN mg/dL  --  38* 31*  --  21  --  22 14  --  24 19   CREATININE mg/dL  --  7 69* 6 68*  --  4 92*  --  5 84* 4 31*  --  5 84* 4 52*   CALCIUM mg/dL  --  8 6 8 8  --  8 8  --  8 8 8 5  --  8 7 8 3   MAGNESIUM mg/dL 2 0  --   --  2 2 2 1  --  1 6 2 1  --  1 7  --    PHOSPHORUS mg/dL  --   --  4 8*  --  3 4  --  4 4* 3 8  --  5 4*  --     < > = values in this interval not displayed

## 2022-09-27 ENCOUNTER — HOSPITAL ENCOUNTER (OUTPATIENT)
Dept: INFUSION CENTER | Facility: HOSPITAL | Age: 72
Discharge: HOME | End: 2022-09-27
Attending: INTERNAL MEDICINE

## 2022-09-27 LAB
ALBUMIN SERPL BCP-MCNC: 2.2 G/DL (ref 3.5–5)
ALP SERPL-CCNC: 141 U/L (ref 46–116)
ALT SERPL W P-5'-P-CCNC: 11 U/L (ref 12–78)
ANION GAP SERPL CALCULATED.3IONS-SCNC: 12 MMOL/L (ref 4–13)
AST SERPL W P-5'-P-CCNC: 21 U/L (ref 5–45)
BASOPHILS # BLD AUTO: 0.04 THOUSANDS/ΜL (ref 0–0.1)
BASOPHILS NFR BLD AUTO: 1 % (ref 0–1)
BILIRUB SERPL-MCNC: 0.6 MG/DL (ref 0.2–1)
BUN SERPL-MCNC: 26 MG/DL (ref 5–25)
CALCIUM ALBUM COR SERPL-MCNC: 10.2 MG/DL (ref 8.3–10.1)
CALCIUM SERPL-MCNC: 8.8 MG/DL (ref 8.3–10.1)
CHLORIDE SERPL-SCNC: 97 MMOL/L (ref 96–108)
CO2 SERPL-SCNC: 28 MMOL/L (ref 21–32)
CREAT SERPL-MCNC: 5.88 MG/DL (ref 0.6–1.3)
EOSINOPHIL # BLD AUTO: 0.25 THOUSAND/ΜL (ref 0–0.61)
EOSINOPHIL NFR BLD AUTO: 4 % (ref 0–6)
ERYTHROCYTE [DISTWIDTH] IN BLOOD BY AUTOMATED COUNT: 17.4 % (ref 11.6–15.1)
GFR SERPL CREATININE-BSD FRML MDRD: 8 ML/MIN/1.73SQ M
GLUCOSE SERPL-MCNC: 128 MG/DL (ref 65–140)
GLUCOSE SERPL-MCNC: 134 MG/DL (ref 65–140)
GLUCOSE SERPL-MCNC: 138 MG/DL (ref 65–140)
GLUCOSE SERPL-MCNC: 155 MG/DL (ref 65–140)
GLUCOSE SERPL-MCNC: 170 MG/DL (ref 65–140)
HCT VFR BLD AUTO: 27 % (ref 36.5–49.3)
HGB BLD-MCNC: 8 G/DL (ref 12–17)
IMM GRANULOCYTES # BLD AUTO: 0.04 THOUSAND/UL (ref 0–0.2)
IMM GRANULOCYTES NFR BLD AUTO: 1 % (ref 0–2)
INR PPP: 1.22 (ref 0.84–1.19)
LYMPHOCYTES # BLD AUTO: 0.62 THOUSANDS/ΜL (ref 0.6–4.47)
LYMPHOCYTES NFR BLD AUTO: 9 % (ref 14–44)
MCH RBC QN AUTO: 25.5 PG (ref 26.8–34.3)
MCHC RBC AUTO-ENTMCNC: 29.6 G/DL (ref 31.4–37.4)
MCV RBC AUTO: 86 FL (ref 82–98)
MONOCYTES # BLD AUTO: 0.89 THOUSAND/ΜL (ref 0.17–1.22)
MONOCYTES NFR BLD AUTO: 13 % (ref 4–12)
NEUTROPHILS # BLD AUTO: 5.19 THOUSANDS/ΜL (ref 1.85–7.62)
NEUTS SEG NFR BLD AUTO: 72 % (ref 43–75)
NRBC BLD AUTO-RTO: 0 /100 WBCS
PLATELET # BLD AUTO: 232 THOUSANDS/UL (ref 149–390)
PMV BLD AUTO: 10.1 FL (ref 8.9–12.7)
POTASSIUM SERPL-SCNC: 3.3 MMOL/L (ref 3.5–5.3)
PROT SERPL-MCNC: 6.5 G/DL (ref 6.4–8.4)
PROTHROMBIN TIME: 16.2 SECONDS (ref 11.6–14.5)
RBC # BLD AUTO: 3.14 MILLION/UL (ref 3.88–5.62)
SODIUM SERPL-SCNC: 137 MMOL/L (ref 135–147)
WBC # BLD AUTO: 7.03 THOUSAND/UL (ref 4.31–10.16)

## 2022-09-27 PROCEDURE — 99232 SBSQ HOSP IP/OBS MODERATE 35: CPT | Performed by: INTERNAL MEDICINE

## 2022-09-27 PROCEDURE — 82948 REAGENT STRIP/BLOOD GLUCOSE: CPT

## 2022-09-27 PROCEDURE — 85610 PROTHROMBIN TIME: CPT | Performed by: INTERNAL MEDICINE

## 2022-09-27 PROCEDURE — 80053 COMPREHEN METABOLIC PANEL: CPT | Performed by: INTERNAL MEDICINE

## 2022-09-27 PROCEDURE — 99233 SBSQ HOSP IP/OBS HIGH 50: CPT | Performed by: INTERNAL MEDICINE

## 2022-09-27 PROCEDURE — 85025 COMPLETE CBC W/AUTO DIFF WBC: CPT | Performed by: INTERNAL MEDICINE

## 2022-09-27 PROCEDURE — C9113 INJ PANTOPRAZOLE SODIUM, VIA: HCPCS | Performed by: INTERNAL MEDICINE

## 2022-09-27 RX ORDER — PANTOPRAZOLE SODIUM 40 MG/1
40 TABLET, DELAYED RELEASE ORAL
Status: DISCONTINUED | OUTPATIENT
Start: 2022-09-27 | End: 2022-09-29

## 2022-09-27 RX ORDER — METOCLOPRAMIDE HYDROCHLORIDE 5 MG/ML
10 INJECTION INTRAMUSCULAR; INTRAVENOUS
Status: DISCONTINUED | OUTPATIENT
Start: 2022-09-27 | End: 2022-09-29

## 2022-09-27 RX ORDER — POTASSIUM CHLORIDE 20 MEQ/1
20 TABLET, EXTENDED RELEASE ORAL ONCE
Status: COMPLETED | OUTPATIENT
Start: 2022-09-27 | End: 2022-09-27

## 2022-09-27 RX ADMIN — INSULIN LISPRO 1 UNITS: 100 INJECTION, SOLUTION INTRAVENOUS; SUBCUTANEOUS at 16:21

## 2022-09-27 RX ADMIN — CLOTRIMAZOLE 1 APPLICATION: 0.01 CREAM TOPICAL at 09:00

## 2022-09-27 RX ADMIN — PANTOPRAZOLE SODIUM 40 MG: 40 TABLET, DELAYED RELEASE ORAL at 16:21

## 2022-09-27 RX ADMIN — DICYCLOMINE HYDROCHLORIDE 10 MG: 10 CAPSULE ORAL at 11:50

## 2022-09-27 RX ADMIN — ALLOPURINOL 300 MG: 300 TABLET ORAL at 08:57

## 2022-09-27 RX ADMIN — ATORVASTATIN CALCIUM 40 MG: 40 TABLET, FILM COATED ORAL at 17:04

## 2022-09-27 RX ADMIN — CLOTRIMAZOLE 1 APPLICATION: 0.01 CREAM TOPICAL at 17:05

## 2022-09-27 RX ADMIN — MIDODRINE HYDROCHLORIDE 10 MG: 5 TABLET ORAL at 11:50

## 2022-09-27 RX ADMIN — MIDODRINE HYDROCHLORIDE 10 MG: 5 TABLET ORAL at 16:21

## 2022-09-27 RX ADMIN — DICYCLOMINE HYDROCHLORIDE 10 MG: 10 CAPSULE ORAL at 21:39

## 2022-09-27 RX ADMIN — CALCIUM ACETATE 667 MG: 667 CAPSULE ORAL at 08:59

## 2022-09-27 RX ADMIN — DICYCLOMINE HYDROCHLORIDE 10 MG: 10 CAPSULE ORAL at 16:21

## 2022-09-27 RX ADMIN — DICYCLOMINE HYDROCHLORIDE 10 MG: 10 CAPSULE ORAL at 06:00

## 2022-09-27 RX ADMIN — MEROPENEM 500 MG: 500 INJECTION, POWDER, FOR SOLUTION INTRAVENOUS at 15:06

## 2022-09-27 RX ADMIN — CALCIUM ACETATE 667 MG: 667 CAPSULE ORAL at 11:50

## 2022-09-27 RX ADMIN — NEPHROCAP 1 CAPSULE: 1 CAP ORAL at 16:21

## 2022-09-27 RX ADMIN — METOCLOPRAMIDE 10 MG: 5 INJECTION, SOLUTION INTRAMUSCULAR; INTRAVENOUS at 16:26

## 2022-09-27 RX ADMIN — VANCOMYCIN HYDROCHLORIDE 125 MG: 125 CAPSULE ORAL at 17:04

## 2022-09-27 RX ADMIN — METOCLOPRAMIDE 10 MG: 5 INJECTION, SOLUTION INTRAMUSCULAR; INTRAVENOUS at 11:50

## 2022-09-27 RX ADMIN — INSULIN LISPRO 1 UNITS: 100 INJECTION, SOLUTION INTRAVENOUS; SUBCUTANEOUS at 14:15

## 2022-09-27 RX ADMIN — Medication 6 MG: at 21:39

## 2022-09-27 RX ADMIN — PANTOPRAZOLE SODIUM 40 MG: 40 INJECTION, POWDER, FOR SOLUTION INTRAVENOUS at 00:31

## 2022-09-27 RX ADMIN — CHOLESTYRAMINE 4 G: 4 POWDER, FOR SUSPENSION ORAL at 20:15

## 2022-09-27 RX ADMIN — FLUTICASONE FUROATE AND VILANTEROL TRIFENATATE 1 PUFF: 200; 25 POWDER RESPIRATORY (INHALATION) at 08:59

## 2022-09-27 RX ADMIN — POTASSIUM CHLORIDE 20 MEQ: 1500 TABLET, EXTENDED RELEASE ORAL at 11:50

## 2022-09-27 RX ADMIN — PANTOPRAZOLE SODIUM 40 MG: 40 INJECTION, POWDER, FOR SOLUTION INTRAVENOUS at 08:57

## 2022-09-27 RX ADMIN — MIDODRINE HYDROCHLORIDE 10 MG: 5 TABLET ORAL at 06:00

## 2022-09-27 RX ADMIN — CHOLESTYRAMINE 4 G: 4 POWDER, FOR SUSPENSION ORAL at 06:00

## 2022-09-27 RX ADMIN — CALCIUM ACETATE 667 MG: 667 CAPSULE ORAL at 16:21

## 2022-09-27 RX ADMIN — VANCOMYCIN HYDROCHLORIDE 125 MG: 125 CAPSULE ORAL at 08:57

## 2022-09-27 NOTE — ASSESSMENT & PLAN NOTE
· CTAP-common bile duct stent with pneumobilia and air within gallblader  Distended gallbladder with cholelithiasis  · RUQ u/s distended gallbladder with air within stent in CBD similar to CT  · Surgery consulted  If concern for acute cholecystitis not surgical candidate would need per vanessa tube  · GI following  · ERCP 8/29/2022, 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, biopsy obtained   ERCP performed with endoscopic mucosal resection of abnormal looking ampullary tissue, PD, CBD stent placed    · No s/s acute cholecystitis monitor   · Surgical and GI follow-up noted

## 2022-09-27 NOTE — ASSESSMENT & PLAN NOTE
9/23 this morning patient started to develop some nausea and vomiting, vomiting noted to be dark in color, no dark stools noted in rectal tube  Gi started patient on IV PPI b i d  Clear liquid diet  Most likely secondary to large hiatal hernia on previous EGD  9/24 rectal tube noted to have dark stools  N/V resolved, continues to have abdominal tenderness  Hg 9 3->10->8 5- 8 0  CT abd/pelvis- Distended gallbladder with cholelithiasis, wall thickening, and pericholecystic inflammatory change  There appear to be calculi extending into the cystic duct  Findings can be due to acute cholecystitis in the appropriate clinical setting  Consider gallbladder ultrasound  2  Previously seen inflammatory changes related to acute sigmoid diverticulitis are no longer visualized "  RUQ US- " Again seen is a distended gallbladder with gallbladder wall thickening up to 15 mm, also containing intraluminal air, gallstones, sludge  Sonographic Lentz's sign is negative "  Surgical follow-up noted  Patient does not have any abdominal pain and as per surgery if patient becomes symptomatic and they recommend cholecystomy tube placement as patient is not a surgical candidate at this time

## 2022-09-27 NOTE — ASSESSMENT & PLAN NOTE
· Follows with Dr Erin Holman  · On Velcade and decadron last tx 9/6  · Free light chains ordered outpt f/u with oncology

## 2022-09-27 NOTE — ASSESSMENT & PLAN NOTE
Lab Results   Component Value Date    HGBA1C 6 3 (H) 08/05/2022       Recent Labs     09/26/22  1200 09/26/22  1635 09/26/22  2117 09/27/22  0800   POCGLU 118 137 170* 134       Blood Sugar Average: Last 72 hrs:  (P) 179 [id number]   · Hold lantus  · SSI

## 2022-09-27 NOTE — PROGRESS NOTES
Progress note - Gastroenterology   Sara Avalos 81 y o  male MRN: [id number]  Unit/Bed#: -01 Encounter: [id number]    ASSESSMENT and PLAN  1  Diverticulitis -resolved  Admitted with abdominal pain, CT scan consistent with diverticulitis  Treated with Zosyn times 10 days  Repeat CT scan 9/24 showed resolution of sigmoid diverticulitis    2  Diarrhea  Developed diarrhea, stool negative for C diff  Likely multifactorial related to antibiotic use and current chemotherapy for multiple myeloma  Continues to have large amount of liquid stool daily-1200 cc past 24 hours  -continue Questran  -add Lomotil to regimen  -monitor electrolyte    3  Nausea/vomiting  Repeat EGD yesterday 9/26 showed small gastric polyp which was excised, large sliding hiatal hernia without Demian lesions, stomach dilated with large amount of retained liquid, concern for gastroparesis  -will add low-dose Reglan to regimen  -continue pantoprazole 40 mg b i d     4  Acute cholecystitis  Gallbladder distended with wall thickening and alaina cholecystic inflammatory changes, cystic duct calculi  Denies abdominal pain  Consider cholecystostomy tube if patient becomes symptomatic, febrile or increased WBC  Surgery following      Chief Complaint   Patient presents with    Abnormal Lab     Patient arrives via EMS from [facility] for an elevated creatinine of 7 3  Reports yellow emesis for a few days  Patient has RUQ and RLQ abdominal pain, is currently getting chemo once a week for kidney cancer         SUBJECTIVE/HPI   EGD yesterday 9/26 showed small gastric polyp which was excised, large sliding hiatal hernia without Demian lesions, stomach dilated with large amount of retained liquid, concern for gastroparesis  Patient denies abdominal pain, no nausea but appetite poor  Fecal management system in place with small amount liquid brownish-green stool  Afebrile times 24 hours    BP (!) 84/44   Pulse 67   Temp 99 6 °F (37 6 °C)   Resp 18   Ht 6' 3" (1 905 m)   Wt 121 kg (267 lb 3 2 oz)   SpO2 91%   BMI 33 40 kg/m²     PHYSICALEXAM  General appearance: alert, appears stated age and cooperative  Eyes:  no icterus   Head: Normocephalic, without obvious abnormality, atraumatic  Lungs: clear to auscultation bilaterally  Heart: regular rate and rhythm, S1, S2 normal, no murmur, click, rub or gallop  Abdomen: soft, distended, nontender with palpation  Bowel sounds present    Continues to have liquid stool with fecal management system in place -1200 cc of stool in past 24 hours  Extremities:  Left BKA  Neurologic: Grossly normal    Lab Results   Component Value Date    GLUCOSE 64 (L) 08/01/2022    CALCIUM 8 8 09/27/2022     01/15/2018    K 3 3 (L) 09/27/2022    CO2 28 09/27/2022    CL 97 09/27/2022    BUN 26 (H) 09/27/2022    CREATININE 5 88 (H) 09/27/2022     Lab Results   Component Value Date    WBC 7 03 09/27/2022    HGB 8 0 (L) 09/27/2022    HCT 27 0 (L) 09/27/2022    MCV 86 09/27/2022     09/27/2022     Lab Results   Component Value Date    ALT 11 (L) 09/27/2022    AST 21 09/27/2022     (H) 11/03/2019    ALKPHOS 141 (H) 09/27/2022    BILITOT 0 6 01/15/2018       Lab Results   Component Value Date    LIPASE 691 (H) 09/13/2022     Lab Results   Component Value Date    IRON 17 (L) 09/23/2022    TIBC 174 (L) 09/23/2022    FERRITIN 479 (H) 09/23/2022     Lab Results   Component Value Date    INR 1 22 (H) 09/27/2022

## 2022-09-27 NOTE — ASSESSMENT & PLAN NOTE
· 2nd episode in one month (tx with 10 days antibx 8/2-8/11)  · CTAP acute diverticulitis  · GI following  · 7/25 Colonoscopy--2 polyps removed, Mild diverticula in the descending colon and sigmoid colon, Small, internal hemorrhoids  · 9/22 finished 10 day course of zosyn  · Questran started 9/17   · Imodium PRN  · Bentyl added 9/18 by GI  · advance diet per gi recommendations  · Gi suspects  multifactorial probably were related to the antibiotics that he is receiving for the diverticulitis as well as a chemo effect from his myeloma  May need to hold chemo on discharge for a period of time  Treatment at this point is supportive  · Monitor rectal tube output, Stool appears much darker today  · After discontinuation of Zosyn the following day on 09/23 patient started to develop leukocytosis with elevated procalcitonin  · Consulted Infectious Disease, recommend obtaining CT abdomen pelvis with p o  Contrast which is has been completed but official read is still pending  · Id recommended checking for C diff - pending  · 9/24 Started patient on p o  Vancomycin and Zosyn  · ID switched patient to [address] [place] [address]    Continue Merrem and oral vancomycin

## 2022-09-27 NOTE — PROGRESS NOTES
Jeffrey Valentineon  Progress Note - Mario Parr 1950, 81 y o  male MRN: [id number]  Unit/Bed#: -01 Encounter: [id number]  Primary Care Provider: Miguel Cervantes MD   Date and time admitted to hospital: 9/12/2022  4:09 PM    Nausea and vomiting  Assessment & Plan  9/23 this morning patient started to develop some nausea and vomiting, vomiting noted to be dark in color, no dark stools noted in rectal tube  Gi started patient on IV PPI b i d  Clear liquid diet  Most likely secondary to large hiatal hernia on previous EGD  9/24 rectal tube noted to have dark stools  N/V resolved, continues to have abdominal tenderness  Hg 9 3->10->8 5- 8 0  CT abd/pelvis- Distended gallbladder with cholelithiasis, wall thickening, and pericholecystic inflammatory change  There appear to be calculi extending into the cystic duct  Findings can be due to acute cholecystitis in the appropriate clinical setting  Consider gallbladder ultrasound  2  Previously seen inflammatory changes related to acute sigmoid diverticulitis are no longer visualized "  RUQ US- " Again seen is a distended gallbladder with gallbladder wall thickening up to 15 mm, also containing intraluminal air, gallstones, sludge  Sonographic Lentz's sign is negative "  Surgical follow-up noted  Patient does not have any abdominal pain and as per surgery if patient becomes symptomatic and they recommend cholecystomy tube placement as patient is not a surgical candidate at this time    Abnormal CT scan  Assessment & Plan  · CTAP-common bile duct stent with pneumobilia and air within gallblader  Distended gallbladder with cholelithiasis  · RUQ u/s distended gallbladder with air within stent in CBD similar to CT  · Surgery consulted   If concern for acute cholecystitis not surgical candidate would need per vanessa tube  · GI following  · ERCP 8/29/2022, Jenna Aleman esophagus seen during EGD, biopsy obtained   ERCP performed with endoscopic mucosal resection of abnormal looking ampullary tissue, PD, CBD stent placed  · No s/s acute cholecystitis monitor   · Surgical and GI follow-up noted    Acute diverticulitis  Assessment & Plan  · 2nd episode in one month (tx with 10 days antibx 8/2-8/11)  · CTAP acute diverticulitis  · GI following  · 7/25 Colonoscopy--2 polyps removed, Mild diverticula in the descending colon and sigmoid colon, Small, internal hemorrhoids  · 9/22 finished 10 day course of zosyn  · Questran started 9/17   · Imodium PRN  · Bentyl added 9/18 by GI  · advance diet per gi recommendations  · Gi suspects  multifactorial probably were related to the antibiotics that he is receiving for the diverticulitis as well as a chemo effect from his myeloma  May need to hold chemo on discharge for a period of time  Treatment at this point is supportive  · Monitor rectal tube output, Stool appears much darker today  · After discontinuation of Zosyn the following day on 09/23 patient started to develop leukocytosis with elevated procalcitonin  · Consulted Infectious Disease, recommend obtaining CT abdomen pelvis with p o  Contrast which is has been completed but official read is still pending  · Id recommended checking for C diff - pending  · 9/24 Started patient on p o  Vancomycin and Zosyn  · ID switched patient to [address] [place] [address]  Continue Merrem and oral vancomycin    Anemia  Assessment & Plan  · Baseline hgb 8  · Receives IV venofer  · Trend H&H  · Transfuse for hgb <7  · Patient underwent EGD on 09/26 which showed One 3 mm polyp in the stomach removed with biopsy forceps  Large sliding hiatal hernia without Demian lesions     Biliary stents in place draining  Dilation of the stomach with large amount of retained   liquid upon entrance to the stomach   Suctioned out  Carlotta Melgoza for element of   Gastroparesis  · Started on low-dose Reglan and continue Protonix      Ambulatory dysfunction  Assessment & Plan  · 2/2 L AKA  · PT/OT    Multiple myeloma [facility])  Assessment & Plan  · Follows with Dr Kenton Estrella  · On Velcade and decadron last tx 9/6  · Free light chains ordered outpt f/u with oncology    Diabetes mellitus, type 2 [facility])  Assessment & Plan  Lab Results   Component Value Date    HGBA1C 6 3 (H) 08/05/2022       Recent Labs     09/26/22  1200 09/26/22  1635 09/26/22  2117 09/27/22  0800   POCGLU 118 137 170* 134       Blood Sugar Average: Last 72 hrs:  (P) 421 [id number]   · Hold lantus  · SSI    NALLELY (obstructive sleep apnea)  Assessment & Plan  · Refuses cpap    Chronic combined systolic and diastolic congestive heart failure (HCC)  Assessment & Plan  Wt Readings from Last 3 Encounters:   09/27/22 121 kg (267 lb 3 2 oz)   09/06/22 117 kg (257 lb 15 oz)   08/30/22 118 kg (260 lb)     · EF 45% decreased RV fx  · I/O  · Daily weight  · Currently being managed with HD  · Demadex discontinued due to low bp and minimal urine output        Morbid obesity ([facility] [address] )  Assessment & Plan  · Dietary education    Chronic atrial fibrillation ([facility] [address] )  Assessment & Plan  · 9/18 Bradycardia with Junctional escape beats 20's overnight with hypotension  · No rate control meds d/c in august due to bradycardia  · TSH 0 4  · Coumadin on hold due to ? Gi bleed  · PT INR  · Cards consulted recommending CPAP HS and occurs with apnea episodes         * DORA (acute kidney injury) ([facility] [address] )  Assessment & Plan  · DORA on CKD 3  · Most recent d/c creat 3-3 7 prior in 2 range  · CT no hydro, nonobstructing calculi  · UA neg  · Nephrology following  · 9/15 R tunneled HD cath placed  · 9/15 and 9/16 IHD  · Monitor for renal recovery remains oliguric  · S/p IHD on/ 9/19   · Currently on m/w/f schedule with no signs of renal recovery  · Temporary to PermCath conversion 09/21/2022  · No signs of renal recovery  · S/P HD yesterday  · Nephrology on board        Labs & Imaging: I have personally reviewed pertinent reports  VTE Prophylaxis: in place      Code Status:   Level 1 - Full Code    Patient Centered Rounds: I have performed bedside rounds with nursing staff today  Discussions with Specialists or Other Care Team Provider:  GI and surgery    Education and Discussions with Family / Patient:  Patient denies any family updated    Current Length of Stay: 15 day(s)    Current Patient Status: Inpatient   Certification Statement: The patient will continue to require additional inpatient hospital stay due to see my assessment and plan  Subjective:   Patient is seen and examined at bedside  Denies any abdominal pain, nausea or vomiting  Afebrile  All other ROS are negative  Objective:    Vitals: Blood pressure 112/55, pulse 65, temperature 99 3 °F (37 4 °C), resp  rate 18, height 6' 3" (1 905 m), weight 121 kg (267 lb 3 2 oz), SpO2 92 %  ,Body mass index is 33 4 kg/m²  SPO2 RA Rest    Flowsheet Row ED to Hosp-Admission (Current) from 9/12/2022 in [address] Med Surg Unit   SpO2 92 %   SpO2 Activity At Rest   O2 Device Nasal cannula   O2 Flow Rate --        I&O:     Intake/Output Summary (Last 24 hours) at 9/27/2022 1049  Last data filed at 9/27/2022 0601  Gross per 24 hour   Intake 722 ml   Output 3700 ml   Net -2978 ml       Physical Exam:    General- Alert, lying comfortably in bed  Not in any acute distress  Neck- Supple, No JVD  CVS- regular, S1 and S2 normal  Chest- Bilateral Air entry, No rhochi, crackles or wheezing present  Abdomen- soft, obese, nontender, not distended, no guarding or rigidity, BS+  Extremities-  No pedal edema, No calf tenderness  CNS-   Alert, awake and orientedx3  No focal deficits present  Invasive Devices  Report    Central Venous Catheter Line  Duration           CVC Central Lines 09/22/22 Double 5 days          Peripheral Intravenous Line  Duration           Peripheral IV 09/27/22 Dorsal (posterior); Right Hand <1 day          Hemodialysis Catheter  Duration           HD Permanent Double Catheter 5 days          Drain  Duration Rectal Tube With balloon 11 days                      Social History  reviewed  Family History   Problem Relation Age of Onset    Other Mother         CARDIAC DISORDER     Diabetes Mother     Cancer Father     Other Family         CARDIAC DISORDER     Diabetes Family     Cancer Family     Mental illness Family     Kidney disease Sister     Diabetes Sister     reviewed    Meds:  Current Facility-Administered Medications   Medication Dose Route Frequency Provider Last Rate Last Admin    acetaminophen (TYLENOL) tablet 650 mg  650 mg Oral Q6H PRN Arleen Werner MD        albuterol (PROVENTIL HFA,VENTOLIN HFA) inhaler 2 puff  2 puff Inhalation Q6H PRN Arleen Werner MD        allopurinol (ZYLOPRIM) tablet 300 mg  300 mg Oral Daily Arleen Werner MD   300 mg at 09/27/22 0857    atorvastatin (LIPITOR) tablet 40 mg  40 mg Oral After Manny Yap MD   40 mg at 09/26/22 1709    b complex-vitamin C-folic acid (NEPHROCAPS) capsule 1 capsule  1 capsule Oral Daily With Leola Moreno MD   1 capsule at 09/26/22 1625    calcium acetate (PHOSLO) capsule 667 mg  667 mg Oral TID With Meals Arleen Werner MD   667 mg at 09/27/22 0859    cholestyramine sugar free (QUESTRAN LIGHT) packet 4 g  4 g Oral BID Arleen Werner MD   4 g at 09/27/22 0600    clotrimazole (LOTRIMIN) 1 % cream   Topical BID Arleen Werner MD   1 application at 11/98/16 0900    dicyclomine (BENTYL) capsule 10 mg  10 mg Oral 4x Daily (AC & HS) Arleen Werner MD   10 mg at 09/27/22 0600    fluticasone-vilanterol (BREO ELLIPTA) 200-25 MCG/INH inhaler 1 puff  1 puff Inhalation Daily Arleen Werner MD   1 puff at 09/27/22 0859    insulin lispro (HumaLOG) 100 units/mL subcutaneous injection 1-6 Units  1-6 Units Subcutaneous TID With Meals Arleen Werner MD   1 Units at 09/25/22 1722    loperamide (IMODIUM) capsule 2 mg  2 mg Oral 4x Daily PRN Arleen Werner MD   2 mg at 09/23/22 1221    melatonin tablet 6 mg  6 mg Oral HS Arleen Werner MD   6 mg at 09/26/22 2111    meropenem (MERREM) 500 mg in sodium chloride 0 9 % 50 mL IVPB  500 mg Intravenous Q24H Chemo Smalls  mL/hr at 09/26/22 1621 500 mg at 09/26/22 1621    metoclopramide (REGLAN) injection 10 mg  10 mg Intravenous BID AC NICOLA Huang        metoclopramide (REGLAN) injection 5 mg  5 mg Intravenous Q12H PRN Chemo Smalls MD        midodrine (PROAMATINE) tablet 10 mg  10 mg Oral TID AC Chemo Smalls MD   10 mg at 09/27/22 0600    ondansetron (ZOFRAN) injection 4 mg  4 mg Intravenous Q6H PRN Chemo Smalls MD   4 mg at 09/26/22 1023    pantoprazole (PROTONIX) injection 40 mg  40 mg Intravenous Q12H [address], [place]   40 mg at 09/27/22 0857    trimethobenzamide (TIGAN) IM injection 200 mg  200 mg Intramuscular Q6H PRN Chemo Smalls MD   200 mg at 09/23/22 1004    vancomycin (VANCOCIN) capsule 125 mg  125 mg Oral BID Chemo Smalls MD   125 mg at 09/27/22 0857      Medications Prior to Admission   Medication    acetaminophen (TYLENOL) 500 mg tablet    albuterol (PROVENTIL HFA,VENTOLIN HFA) 90 mcg/act inhaler    Alcohol Swabs (ALCOHOL PREP) 70 % PADS    allopurinol (ZYLOPRIM) 300 mg tablet    ammonium lactate (LAC-HYDRIN) 12 % lotion    atorvastatin (LIPITOR) 40 mg tablet    BD AUTOSHIELD DUO 30G X 5 MM MISC    BD INSULIN SYRINGE U/F 31G X 5/16" 0 5 ML MISC    BD PEN NEEDLE HILARIO U/F 32G X 4 MM MISC    bortezomib (VELCADE)    cholestyramine sugar free (QUESTRAN LIGHT) 4 g packet    clotrimazole (LOTRIMIN) 1 % cream    Easy Touch Safety Lancets 28G MISC    fluticasone (FLONASE) 50 mcg/act nasal spray    fluticasone-salmeterol (ADVAIR DISKUS, WIXELA INHUB) 250-50 mcg/dose inhaler    Insulin Glargine Solostar (Lantus SoloStar) 100 UNIT/ML SOPN    liraglutide (Victoza) injection    loperamide (IMODIUM) 2 mg capsule    multivitamin-minerals therapeutic (THERA-M)    NOVOLOG FLEXPEN 100 units/mL SOPN    nystatin (MYCOSTATIN) powder    omeprazole (PriLOSEC) 40 MG capsule    ondansetron (ZOFRAN) 4 mg tablet  potassium chloride (K-DUR,KLOR-CON) 20 mEq tablet    torsemide (DEMADEX) 20 mg tablet       Labs:  Results from last 7 days   Lab Units 09/27/22  0530 09/26/22  0448 09/25/22  2320   WBC Thousand/uL 7 03 9 95 10 69*   HEMOGLOBIN g/dL 8 0* 8 2* 8 3*   HEMATOCRIT % 27 0* 26 9* 27 1*   PLATELETS Thousands/uL 232 199 201   NEUTROS PCT % 72  --  78*   LYMPHS PCT % 9*  --  10*   MONOS PCT % 13*  --  9   EOS PCT % 4  --  1     Results from last 7 days   Lab Units 09/27/22  0530 09/26/22  0448 09/25/22  2320 09/25/22  0509   POTASSIUM mmol/L 3 3*  --  3 8 3 7   CHLORIDE mmol/L 97  --  96 98   CO2 mmol/L 28  --  26 28   BUN mg/dL 26*  --  38* 31*   CREATININE mg/dL 5 88*  --  7 69* 6 68*   CALCIUM mg/dL 8 8  --  8 6 8 8   ALK PHOS U/L 141* 129* 131*  --    ALT U/L 11* 9* 8*  --    AST U/L 21 18 20  --      Lab Results   Component Value Date    TROPONINI 5 19 (H) 06/27/2020    TROPONINI 5 85 (H) 06/27/2020    TROPONINI 6 35 (H) 06/27/2020    CKMB 4 1 06/25/2020    CKTOTAL 271 06/25/2020    CKTOTAL 53 03/23/2018    CKTOTAL 55 10/08/2017     Results from last 7 days   Lab Units 09/27/22  0530 09/25/22  0751 09/24/22  1026   INR  1 22* 2 04* 2 03*     Lab Results   Component Value Date    BLOODCX No Growth at 24 hrs  09/25/2022    BLOODCX No Growth at 24 hrs  09/25/2022    BLOODCX No Growth After 5 Days  09/12/2022    BLOODCX No Growth After 5 Days   09/12/2022    URINECX >100,000 cfu/ml Escherichia coli (A) 12/05/2019    WOUNDCULT 4+ Growth of Proteus mirabilis (A) 07/25/2019    WOUNDCULT 4+ Growth of  07/25/2019    WOUNDCULT (A) 07/25/2019     4+ Growth of Methicillin Resistant Staphylococcus aureus    WOUNDCULT 1+ Growth of Proteus mirabilis (A) 07/25/2019    WOUNDCULT 4+ Growth of  07/25/2019    SPUTUMCULTUR 4+ Growth of  07/11/2020    SPUTUMCULTUR 1+ Growth of Aspergillus fumigatus (A) 07/11/2020         Imaging:  Results for orders placed during the hospital encounter of 09/12/22    XR chest portable    Narrative  CHEST    INDICATION:   fever  COMPARISON:  9/12/2022    EXAM PERFORMED/VIEWS:  XR CHEST PORTABLE      FINDINGS:  There is a right-sided dialysis catheter, placed since the previous study  Heart shadow is enlarged but unchanged from prior exam  There is a large hiatal hernia  There is mild bibasilar atelectasis  Evaluation of the left lung bases otherwise limited by enlarged heart and hernia  No pneumothorax or definite pleural effusion  Osseous structures appear within normal limits for patient age  Impression  Limited evaluation of the left base  Mild bibasilar atelectasis, cardiomegaly, and large hiatal hernia  Workstation performed: [id number]    Results for orders placed during the hospital encounter of 10/04/21    XR chest pa & lateral    Narrative  CHEST    INDICATION:   follow up lung consolidations  COMPARISON:  CTA chest/abdomen/pelvis 10/4/2021  Chest radiograph 7/8/2020  EXAM PERFORMED/VIEWS:  XR CHEST PA & LATERAL      FINDINGS:    Heart shadow is enlarged but unchanged from prior exam     Again seen is dense left lower lobe/retrocardiac consolidation with ill-defined airspace opacities in the lingula and right lower lobe, similar to recent chest CT  Trace left pleural effusion  No pneumothorax  Osseous structures appear within normal limits for patient age  Impression  Stable left lower lobe/retrocardiac consolidation and lingular and right lower lobe ill-defined airspace opacities  Trace left pleural effusion          Workstation performed: [id number]      Last 24 Hours Medication List:   Current Facility-Administered Medications   Medication Dose Route Frequency Provider Last Rate    acetaminophen  650 mg Oral Q6H PRN Juan Stallings MD      albuterol  2 puff Inhalation Q6H PRN Juan Stallings MD      allopurinol  300 mg Oral Daily Juan Stallings MD      atorvastatin  40 mg Oral After Niraj Maguire MD      b complex-vitamin C-folic acid 1 capsule Oral Daily With Herberth Gutierrez MD      calcium acetate  667 mg Oral TID With [address] [place] [address] MD Bryant      cholestyramine sugar free  4 g Oral BID Gina Sow MD      clotrimazole   Topical BID Gina Sow MD      dicyclomine  10 mg Oral 4x Daily (AC & HS) Gina Sow MD      fluticasone-vilanterol  1 puff Inhalation Daily Gina Sow MD      insulin lispro  1-6 Units Subcutaneous TID With Meals Gina Sow MD      loperamide  2 mg Oral 4x Daily PRN Gina Sow MD      melatonin  6 mg Oral HS Gina Sow MD      meropenem  500 mg Intravenous Q24H Gina Sow  mg (09/26/22 1621)    metoclopramide  10 mg Intravenous BID AC NICOLA Cantu      metoclopramide  5 mg Intravenous Q12H PRN Gina Sow MD      midodrine  10 mg Oral TID AC Gina Sow MD      ondansetron  4 mg Intravenous Q6H PRN Gina Sow MD      pantoprazole  40 mg Intravenous Q12H [address], [place]      trimethobenzamide  200 mg Intramuscular Q6H PRN Gina Sow MD      vancomycin  125 mg Oral BID Gina Sow MD          Today, Patient Was Seen By: Laisha Burger MD    ** Please Note: Dictation voice to text software may have been used in the creation of this document   **

## 2022-09-27 NOTE — QUICK NOTE
Discussed case with Dr FONTANEZ [facility] who will order HIDA scan to further eval for cholecystitis so I can determine duration of abx tx    - Cont Meropenem and po Vanco for now  - Awaiting results of HIDA scan

## 2022-09-27 NOTE — PROGRESS NOTES
Umesh Blind  81 y o   male  1950  mrn [id number]    Assessment/Plan:  1  Sigmoid diverticulitis/Presumed cholecystitis/Fever/Leukocytosis/  Diarrhea/DORA on CKD: Pt developed fever to 101 7 over the past 24 hrs but WBC count is 8 8K  He denies abd pain today but conts to have diarrhea  Repeat abd CT was done on 9 25 and showed resolution of diverticulitis but conts to show findings suggestive of cholecystitis  EGD was done today  Repeat bld cx's are neg so far  Zosyn was changed to Meropenem last night when Pt developed fever  He remains on low dose po Vanco for C diff prophylaxis  Creatinine conts to be at 7 69 and Pt remains on dialysis    Pt presented with weakness, right-sided abd pain, and N/V/D      On admission, he did not have fever WBC count was 10 2K  Creatinine was elevated at 8 4 c/w DORA on CKD  Abd CT showed decreased sigmoid diverticulitis and possible cholecystitis  He was started on Zosyn for tx of diverticulitis  LFT's were nml  C diff and enteric stool panel were neg, Dialysis was started on 9/16  He finished course of Zosyn on 9/22  He developed low grade fever on 9/22  N/V recurred on 9/23  He conts to have diarrhea requiring rectal tube  WBC count increased to 11 6K on 9/24 and procalcitonin was elevated at 6 39  Zosyn was restarted and Pt was placed on oral Vanco  Repeat C diff was neg  No further low grade fever and WBC count has decreased to 8 8K but procalcitonin increased slightly to 7 79      - Cont Meropenem  - ? Need for placement of cholecystotomy tube although Pt denies abd pain today with restart of abx tx  - Cont oral Vanco for C diff prophylaxis since Pt may require further abx tx  - ? Whether HIDA scan would be helpful in this situation for further eval of cholecystitis  - Cont tx of diarrhea as per GI - ?due to abx tx or possibly microscopic colitis  - Cont dialysis as per Renal for DORA on CKD - will adjust abx doses based on creatinine clearance  - Will cont to monitor for the development of toxicity to current abx tx       Subjective: Pt is having dialysis  He denies abd pain  He had fever ovr the past 24 hrs but WBC count is nml  He is still having diarrhea  Objective:  VSS, Tmax: 101 7  HEENT:  No scleral icterus  NECK: Supple  CARDIAC:  RRR, nml S1, S2  LUNGS:  Clear anteriorly  ABDOMEN:  +BS, soft, nontender  MUSCULOSKELETAL:  No right calf tenderness  EXTREMITIES: Left AKA  SKIN:  No rash      Labs:  CBC w/diff  Recent Labs     09/25/22 2320 09/26/22  0448   WBC 10 69* 9 95   HGB 8 3* 8 2*   HCT 27 1* 26 9*    199   NEUTOPHILPCT 78*  --    LYMPHOPCT 10*  --    MONOPCT 9  --    EOSPCT 1  --      BMP  Recent Labs     09/25/22 2320   K 3 8   CL 96   CO2 26   BUN 38*   CREATININE 7 69*   CALCIUM 8 6     CMP  Recent Labs     09/25/22 2320 09/26/22  0448   K 3 8  --    CL 96  --    CO2 26  --    BUN 38*  --    CREATININE 7 69*  --    CALCIUM 8 6  --    ALKPHOS 131* 129*   ALT 8* 9*   AST 20 18        labrc    Cultures:  Lab Results   Component Value Date    BLOODCX Received in Microbiology Lab  Culture in Progress  09/25/2022    BLOODCX Received in Microbiology Lab  Culture in Progress  09/25/2022    BLOODCX No Growth After 5 Days  09/12/2022    BLOODCX No Growth After 5 Days  09/12/2022    BLOODCX No Growth After 5 Days  10/04/2021    BLOODCX No Growth After 5 Days  10/04/2021    BLOODCX No Growth After 5 Days  06/25/2020    BLOODCX No Growth After 5 Days  06/25/2020    BLOODCX Staphylococcus coagulase negative (A) 12/04/2019    BLOODCX No Growth After 5 Days  12/04/2019    BLOODCX No Growth After 5 Days  10/29/2019    BLOODCX No Growth After 5 Days  10/29/2019    BLOODCX No Growth After 5 Days  07/25/2019    BLOODCX No Growth After 5 Days  07/25/2019    BLOODCX No Growth After 5 Days  12/25/2018    BLOODCX No Growth After 5 Days  12/25/2018    BLOODCX No Growth After 5 Days  03/23/2018    BLOODCX No Growth After 5 Days  03/23/2018    BLOODCX No Growth After 5 Days  10/08/2017    BLOODCX No Growth After 5 Days  10/08/2017    BLOODCX No Growth After 5 Days  09/15/2016    BLOODCX No Growth After 5 Days   09/15/2016     Lab Results   Component Value Date    WOUNDCULT 4+ Growth of Proteus mirabilis (A) 07/25/2019    WOUNDCULT 4+ Growth of  07/25/2019    WOUNDCULT (A) 07/25/2019     4+ Growth of Methicillin Resistant Staphylococcus aureus    WOUNDCULT 1+ Growth of Proteus mirabilis (A) 07/25/2019    WOUNDCULT 4+ Growth of  07/25/2019    WOUNDCULT 3+ Growth of Staphylococcus aureus (A) 12/25/2018    WOUNDCULT 2+ Growth of  12/25/2018    WOUNDCULT 4+ Growth of Staphylococcus aureus 09/15/2016    WOUNDCULT 4+ Growth of Proteus mirabilis 09/15/2016    WOUNDCULT 4+ Growth of Mixed Skin Nini 09/15/2016     Lab Results   Component Value Date    URINECX >100,000 cfu/ml Escherichia coli (A) 12/05/2019     Lab Results   Component Value Date    SPUTUMCULTUR 4+ Growth of  07/11/2020    SPUTUMCULTUR 1+ Growth of Aspergillus fumigatus (A) 07/11/2020       MED:  Meropenem: #2  Oral Vanco; #3        Completed:  Zosyn x 11 days on 9/22  Zosyn x 2 days on 9/25      Current Facility-Administered Medications:     acetaminophen (TYLENOL) tablet 650 mg, 650 mg, Oral, Q6H PRN, Saintclair Skene, MD    albuterol (PROVENTIL HFA,VENTOLIN HFA) inhaler 2 puff, 2 puff, Inhalation, Q6H PRN, Saintclair Skene, MD    allopurinol (ZYLOPRIM) tablet 300 mg, 300 mg, Oral, Daily, Saintclair Skene, MD, 300 mg at 09/26/22 1145    atorvastatin (LIPITOR) tablet 40 mg, 40 mg, Oral, After Molly Tovar MD, 40 mg at 09/26/22 1709    b complex-vitamin C-folic acid (NEPHROCAPS) capsule 1 capsule, 1 capsule, Oral, Daily With Nae Avendaño MD, 1 capsule at 09/26/22 1625    calcium acetate (PHOSLO) capsule 667 mg, 667 mg, Oral, TID With Meals, Saintclair Skene, MD, 667 mg at 09/26/22 1625    cholestyramine sugar free (QUESTRAN LIGHT) packet 4 g, 4 g, Oral, BID, Saintclair Skene, MD, 4 g at 09/26/22 2111    clotrimazole (LOTRIMIN) 1 % cream, , Topical, BID, Yvon Gomez MD, Given at 09/26/22 1709    dicyclomine (BENTYL) capsule 10 mg, 10 mg, Oral, 4x Daily (AC & HS), Yvon Gomez MD, 10 mg at 09/26/22 2111    fluticasone-vilanterol (BREO ELLIPTA) 200-25 MCG/INH inhaler 1 puff, 1 puff, Inhalation, Daily, Yvon Gomez MD, 1 puff at 09/26/22 1206    insulin lispro (HumaLOG) 100 units/mL subcutaneous injection 1-6 Units, 1-6 Units, Subcutaneous, TID With Meals, 1 Units at 09/25/22 1722 **AND** Fingerstick Glucose (POCT), , , 4x Daily AC and at bedtime, Yvon Gomez MD    loperamide (IMODIUM) capsule 2 mg, 2 mg, Oral, 4x Daily PRN, Yvon Gomez MD, 2 mg at 09/23/22 1221    melatonin tablet 6 mg, 6 mg, Oral, HS, Yvon Gomez MD, 6 mg at 09/26/22 2111    meropenem (MERREM) 500 mg in sodium chloride 0 9 % 50 mL IVPB, 500 mg, Intravenous, Q24H, Yvon Gomez MD, Last Rate: 100 mL/hr at 09/26/22 1621, 500 mg at 09/26/22 1621    metoclopramide (REGLAN) injection 5 mg, 5 mg, Intravenous, Q12H PRN, Yvon Gomez MD    midodrine (PROAMATINE) tablet 10 mg, 10 mg, Oral, TID AC, Yvon Gomez MD, 10 mg at 09/26/22 1625    ondansetron (ZOFRAN) injection 4 mg, 4 mg, Intravenous, Q6H PRN, Yvon Gomez MD, 4 mg at 09/26/22 1023    pantoprazole (PROTONIX) injection 40 mg, 40 mg, Intravenous, Q12H [address], Yvon Gomez MD, 40 mg at 09/26/22 1145    trimethobenzamide (TIGAN) IM injection 200 mg, 200 mg, Intramuscular, Q6H PRN, Yvon Gomez MD, 200 mg at 09/23/22 1004    vancomycin (VANCOCIN) capsule 125 mg, 125 mg, Oral, BID, Yvon Gomez MD, 125 mg at 09/26/22 8987    Principal Problem:    DORA (acute kidney injury) ([facility] )  Active Problems:    Chronic atrial fibrillation ([facility] )    Morbid obesity ([facility] )    Chronic combined systolic and diastolic congestive heart failure (HCC)    NALLELY (obstructive sleep apnea)    Diabetes mellitus, type 2 ([facility] )    Multiple myeloma ([facility] [facility] )    Ambulatory dysfunction    Anemia    Acute diverticulitis    Abnormal CT scan    Nausea and vomiting      Alisa Yeboah MD

## 2022-09-27 NOTE — ASSESSMENT & PLAN NOTE
Wt Readings from Last 3 Encounters:   09/27/22 121 kg (267 lb 3 2 oz)   09/06/22 117 kg (257 lb 15 oz)   08/30/22 118 kg (260 lb)     · EF 45% decreased RV fx  · I/O  · Daily weight  · Currently being managed with HD  · Demadex discontinued due to low bp and minimal urine output

## 2022-09-27 NOTE — PLAN OF CARE
Problem: Potential for Falls  Goal: Patient will remain free of falls  Description: INTERVENTIONS:  - Educate patient/family on patient safety including physical limitations  - Instruct patient to call for assistance with activity   - Consult OT/PT to assist with strengthening/mobility   - Keep Call bell within reach  - Keep bed low and locked with side rails adjusted as appropriate  - Keep care items and personal belongings within reach  - Initiate and maintain comfort rounds  - Make Fall Risk Sign visible to staff  - Offer Toileting every 2 Hours, in advance of need  - Initiate/Maintain bed alarm  - Obtain necessary fall risk management equipment:   - Apply yellow socks and bracelet for high fall risk patients  - Consider moving patient to room near nurses station  Outcome: Progressing     Problem: MOBILITY - ADULT  Goal: Maintain or return to baseline ADL function  Description: INTERVENTIONS:  - Educate patient/family on patient safety including physical limitations  - Instruct patient to call for assistance with activity   - Consult OT/PT to assist with strengthening/mobility   - Keep Call bell within reach  - Keep bed low and locked with side rails adjusted as appropriate  - Keep care items and personal belongings within reach  - Initiate and maintain comfort rounds  - Make Fall Risk Sign visible to staff  - Offer Toileting every 2 Hours, in advance of need  - Initiate/Maintain bed alarm  - Obtain necessary fall risk management equipment:   - Apply yellow socks and bracelet for high fall risk patients  - Consider moving patient to room near nurses station  Outcome: Progressing  Goal: Maintains/Returns to pre admission functional level  Description: INTERVENTIONS:  -  Assess patient's ability to carry out ADLs; assess patient's baseline for ADL function and identify physical deficits which impact ability to perform ADLs (bathing, care of mouth/teeth, toileting, grooming, dressing, etc )  - Assess/evaluate cause of self-care deficits   - Assess range of motion  - Assess patient's mobility; develop plan if impaired  - Assess patient's need for assistive devices and provide as appropriate  - Encourage maximum independence but intervene and supervise when necessary  - Involve family in performance of ADLs  - Assess for home care needs following discharge   - Consider OT consult to assist with ADL evaluation and planning for discharge  - Provide patient education as appropriate  Outcome: Progressing     Problem: Prexisting or High Potential for Compromised Skin Integrity  Goal: Skin integrity is maintained or improved  Description: INTERVENTIONS:  - Identify patients at risk for skin breakdown  - Assess and monitor skin integrity  - Assess and monitor nutrition and hydration status  - Monitor labs   - Assess for incontinence   - Turn and reposition patient  - Assist with mobility/ambulation  - Relieve pressure over bony prominences  - Avoid friction and shearing  - Provide appropriate hygiene as needed including keeping skin clean and dry  - Evaluate need for skin moisturizer/barrier cream  - Collaborate with interdisciplinary team   - Patient/family teaching  - Consider wound care consult   Outcome: Progressing     Problem: Nutrition/Hydration-ADULT  Goal: Nutrient/Hydration intake appropriate for improving, restoring or maintaining nutritional needs  Description: Monitor and assess patient's nutrition/hydration status for malnutrition  Collaborate with interdisciplinary team and initiate plan and interventions as ordered  Monitor patient's weight and dietary intake as ordered or per policy  Utilize nutrition screening tool and intervene as necessary  Determine patient's food preferences and provide high-protein, high-caloric foods as appropriate       INTERVENTIONS:  - Monitor oral intake, urinary output, labs, and treatment plans  - Assess nutrition and hydration status and recommend course of action  - Evaluate amount of meals eaten  - Assist patient with eating if necessary   - Allow adequate time for meals  - Recommend/ encourage appropriate diets, oral nutritional supplements, and vitamin/mineral supplements  - Order, calculate, and assess calorie counts as needed  - Recommend, monitor, and adjust tube feedings and TPN/PPN based on assessed needs  - Assess need for intravenous fluids  - Provide specific nutrition/hydration education as appropriate  - Include patient/family/caregiver in decisions related to nutrition  Outcome: Progressing     Problem: GASTROINTESTINAL - ADULT  Goal: Minimal or absence of nausea and/or vomiting  Description: INTERVENTIONS:  - Administer IV fluids if ordered to ensure adequate hydration  - Maintain NPO status until nausea and vomiting are resolved  - Nasogastric tube if ordered  - Administer ordered antiemetic medications as needed  - Provide nonpharmacologic comfort measures as appropriate  - Advance diet as tolerated, if ordered  - Consider nutrition services referral to assist patient with adequate nutrition and appropriate food choices  Outcome: Progressing  Goal: Maintains or returns to baseline bowel function  Description: INTERVENTIONS:  - Assess bowel function  - Encourage oral fluids to ensure adequate hydration  - Administer IV fluids if ordered to ensure adequate hydration  - Administer ordered medications as needed  - Encourage mobilization and activity  - Consider nutritional services referral to assist patient with adequate nutrition and appropriate food choices  Outcome: Progressing  Goal: Maintains adequate nutritional intake  Description: INTERVENTIONS:  - Monitor percentage of each meal consumed  - Identify factors contributing to decreased intake, treat as appropriate  - Assist with meals as needed  - Monitor I&O, weight, and lab values if indicated  - Obtain nutrition services referral as needed  Outcome: Progressing     Problem: MOBILITY - ADULT  Goal: Maintain or return to baseline ADL function  Description: INTERVENTIONS:  - Educate patient/family on patient safety including physical limitations  - Instruct patient to call for assistance with activity   - Consult OT/PT to assist with strengthening/mobility   - Keep Call bell within reach  - Keep bed low and locked with side rails adjusted as appropriate  - Keep care items and personal belongings within reach  - Initiate and maintain comfort rounds  - Make Fall Risk Sign visible to staff  - Offer Toileting every 2 Hours, in advance of need  - Initiate/Maintain bed alarm  - Obtain necessary fall risk management equipment:   - Apply yellow socks and bracelet for high fall risk patients  - Consider moving patient to room near nurses station  Outcome: Progressing  Goal: Maintains/Returns to pre admission functional level  Description: INTERVENTIONS:  -  Assess patient's ability to carry out ADLs; assess patient's baseline for ADL function and identify physical deficits which impact ability to perform ADLs (bathing, care of mouth/teeth, toileting, grooming, dressing, etc )  - Assess/evaluate cause of self-care deficits   - Assess range of motion  - Assess patient's mobility; develop plan if impaired  - Assess patient's need for assistive devices and provide as appropriate  - Encourage maximum independence but intervene and supervise when necessary  - Involve family in performance of ADLs  - Assess for home care needs following discharge   - Consider OT consult to assist with ADL evaluation and planning for discharge  - Provide patient education as appropriate  Outcome: Progressing     Problem: Prexisting or High Potential for Compromised Skin Integrity  Goal: Skin integrity is maintained or improved  Description: INTERVENTIONS:  - Identify patients at risk for skin breakdown  - Assess and monitor skin integrity  - Assess and monitor nutrition and hydration status  - Monitor labs   - Assess for incontinence   - Turn and reposition patient  - Assist with mobility/ambulation  - Relieve pressure over bony prominences  - Avoid friction and shearing  - Provide appropriate hygiene as needed including keeping skin clean and dry  - Evaluate need for skin moisturizer/barrier cream  - Collaborate with interdisciplinary team   - Patient/family teaching  - Consider wound care consult   Outcome: Progressing     Problem: Nutrition/Hydration-ADULT  Goal: Nutrient/Hydration intake appropriate for improving, restoring or maintaining nutritional needs  Description: Monitor and assess patient's nutrition/hydration status for malnutrition  Collaborate with interdisciplinary team and initiate plan and interventions as ordered  Monitor patient's weight and dietary intake as ordered or per policy  Utilize nutrition screening tool and intervene as necessary  Determine patient's food preferences and provide high-protein, high-caloric foods as appropriate       INTERVENTIONS:  - Monitor oral intake, urinary output, labs, and treatment plans  - Assess nutrition and hydration status and recommend course of action  - Evaluate amount of meals eaten  - Assist patient with eating if necessary   - Allow adequate time for meals  - Recommend/ encourage appropriate diets, oral nutritional supplements, and vitamin/mineral supplements  - Order, calculate, and assess calorie counts as needed  - Recommend, monitor, and adjust tube feedings and TPN/PPN based on assessed needs  - Assess need for intravenous fluids  - Provide specific nutrition/hydration education as appropriate  - Include patient/family/caregiver in decisions related to nutrition  Outcome: Progressing     Problem: GASTROINTESTINAL - ADULT  Goal: Minimal or absence of nausea and/or vomiting  Description: INTERVENTIONS:  - Administer IV fluids if ordered to ensure adequate hydration  - Maintain NPO status until nausea and vomiting are resolved  - Nasogastric tube if ordered  - Administer ordered antiemetic medications as needed  - Provide nonpharmacologic comfort measures as appropriate  - Advance diet as tolerated, if ordered  - Consider nutrition services referral to assist patient with adequate nutrition and appropriate food choices  Outcome: Progressing  Goal: Maintains or returns to baseline bowel function  Description: INTERVENTIONS:  - Assess bowel function  - Encourage oral fluids to ensure adequate hydration  - Administer IV fluids if ordered to ensure adequate hydration  - Administer ordered medications as needed  - Encourage mobilization and activity  - Consider nutritional services referral to assist patient with adequate nutrition and appropriate food choices  Outcome: Progressing  Goal: Maintains adequate nutritional intake  Description: INTERVENTIONS:  - Monitor percentage of each meal consumed  - Identify factors contributing to decreased intake, treat as appropriate  - Assist with meals as needed  - Monitor I&O, weight, and lab values if indicated  - Obtain nutrition services referral as needed  Outcome: Progressing     Problem: Prexisting or High Potential for Compromised Skin Integrity  Goal: Skin integrity is maintained or improved  Description: INTERVENTIONS:  - Identify patients at risk for skin breakdown  - Assess and monitor skin integrity  - Assess and monitor nutrition and hydration status  - Monitor labs   - Assess for incontinence   - Turn and reposition patient  - Assist with mobility/ambulation  - Relieve pressure over bony prominences  - Avoid friction and shearing  - Provide appropriate hygiene as needed including keeping skin clean and dry  - Evaluate need for skin moisturizer/barrier cream  - Collaborate with interdisciplinary team   - Patient/family teaching  - Consider wound care consult   Outcome: Progressing     Problem: Nutrition/Hydration-ADULT  Goal: Nutrient/Hydration intake appropriate for improving, restoring or maintaining nutritional needs  Description: Monitor and assess patient's nutrition/hydration status for malnutrition  Collaborate with interdisciplinary team and initiate plan and interventions as ordered  Monitor patient's weight and dietary intake as ordered or per policy  Utilize nutrition screening tool and intervene as necessary  Determine patient's food preferences and provide high-protein, high-caloric foods as appropriate       INTERVENTIONS:  - Monitor oral intake, urinary output, labs, and treatment plans  - Assess nutrition and hydration status and recommend course of action  - Evaluate amount of meals eaten  - Assist patient with eating if necessary   - Allow adequate time for meals  - Recommend/ encourage appropriate diets, oral nutritional supplements, and vitamin/mineral supplements  - Order, calculate, and assess calorie counts as needed  - Recommend, monitor, and adjust tube feedings and TPN/PPN based on assessed needs  - Assess need for intravenous fluids  - Provide specific nutrition/hydration education as appropriate  - Include patient/family/caregiver in decisions related to nutrition  Outcome: Progressing     Problem: GASTROINTESTINAL - ADULT  Goal: Minimal or absence of nausea and/or vomiting  Description: INTERVENTIONS:  - Administer IV fluids if ordered to ensure adequate hydration  - Maintain NPO status until nausea and vomiting are resolved  - Nasogastric tube if ordered  - Administer ordered antiemetic medications as needed  - Provide nonpharmacologic comfort measures as appropriate  - Advance diet as tolerated, if ordered  - Consider nutrition services referral to assist patient with adequate nutrition and appropriate food choices  Outcome: Progressing  Goal: Maintains or returns to baseline bowel function  Description: INTERVENTIONS:  - Assess bowel function  - Encourage oral fluids to ensure adequate hydration  - Administer IV fluids if ordered to ensure adequate hydration  - Administer ordered medications as needed  - Encourage mobilization and activity  - Consider nutritional services referral to assist patient with adequate nutrition and appropriate food choices  Outcome: Progressing  Goal: Maintains adequate nutritional intake  Description: INTERVENTIONS:  - Monitor percentage of each meal consumed  - Identify factors contributing to decreased intake, treat as appropriate  - Assist with meals as needed  - Monitor I&O, weight, and lab values if indicated  - Obtain nutrition services referral as needed  Outcome: Progressing     Problem: GASTROINTESTINAL - ADULT  Goal: Minimal or absence of nausea and/or vomiting  Description: INTERVENTIONS:  - Administer IV fluids if ordered to ensure adequate hydration  - Maintain NPO status until nausea and vomiting are resolved  - Nasogastric tube if ordered  - Administer ordered antiemetic medications as needed  - Provide nonpharmacologic comfort measures as appropriate  - Advance diet as tolerated, if ordered  - Consider nutrition services referral to assist patient with adequate nutrition and appropriate food choices  Outcome: Progressing  Goal: Maintains or returns to baseline bowel function  Description: INTERVENTIONS:  - Assess bowel function  - Encourage oral fluids to ensure adequate hydration  - Administer IV fluids if ordered to ensure adequate hydration  - Administer ordered medications as needed  - Encourage mobilization and activity  - Consider nutritional services referral to assist patient with adequate nutrition and appropriate food choices  Outcome: Progressing  Goal: Maintains adequate nutritional intake  Description: INTERVENTIONS:  - Monitor percentage of each meal consumed  - Identify factors contributing to decreased intake, treat as appropriate  - Assist with meals as needed  - Monitor I&O, weight, and lab values if indicated  - Obtain nutrition services referral as needed  Outcome: Progressing     Problem: GASTROINTESTINAL - ADULT  Goal: Minimal or absence of nausea and/or vomiting  Description: INTERVENTIONS:  - Administer IV fluids if ordered to ensure adequate hydration  - Maintain NPO status until nausea and vomiting are resolved  - Nasogastric tube if ordered  - Administer ordered antiemetic medications as needed  - Provide nonpharmacologic comfort measures as appropriate  - Advance diet as tolerated, if ordered  - Consider nutrition services referral to assist patient with adequate nutrition and appropriate food choices  Outcome: Progressing  Goal: Maintains or returns to baseline bowel function  Description: INTERVENTIONS:  - Assess bowel function  - Encourage oral fluids to ensure adequate hydration  - Administer IV fluids if ordered to ensure adequate hydration  - Administer ordered medications as needed  - Encourage mobilization and activity  - Consider nutritional services referral to assist patient with adequate nutrition and appropriate food choices  Outcome: Progressing  Goal: Maintains adequate nutritional intake  Description: INTERVENTIONS:  - Monitor percentage of each meal consumed  - Identify factors contributing to decreased intake, treat as appropriate  - Assist with meals as needed  - Monitor I&O, weight, and lab values if indicated  - Obtain nutrition services referral as needed  Outcome: Progressing     Problem: Nutrition/Hydration-ADULT  Goal: Nutrient/Hydration intake appropriate for improving, restoring or maintaining nutritional needs  Description: Monitor and assess patient's nutrition/hydration status for malnutrition  Collaborate with interdisciplinary team and initiate plan and interventions as ordered  Monitor patient's weight and dietary intake as ordered or per policy  Utilize nutrition screening tool and intervene as necessary  Determine patient's food preferences and provide high-protein, high-caloric foods as appropriate       INTERVENTIONS:  - Monitor oral intake, urinary output, labs, and treatment plans  - Assess nutrition and hydration status and recommend course of action  - Evaluate amount of meals eaten  - Assist patient with eating if necessary   - Allow adequate time for meals  - Recommend/ encourage appropriate diets, oral nutritional supplements, and vitamin/mineral supplements  - Order, calculate, and assess calorie counts as needed  - Recommend, monitor, and adjust tube feedings and TPN/PPN based on assessed needs  - Assess need for intravenous fluids  - Provide specific nutrition/hydration education as appropriate  - Include patient/family/caregiver in decisions related to nutrition  Outcome: Progressing     Problem: GASTROINTESTINAL - ADULT  Goal: Minimal or absence of nausea and/or vomiting  Description: INTERVENTIONS:  - Administer IV fluids if ordered to ensure adequate hydration  - Maintain NPO status until nausea and vomiting are resolved  - Nasogastric tube if ordered  - Administer ordered antiemetic medications as needed  - Provide nonpharmacologic comfort measures as appropriate  - Advance diet as tolerated, if ordered  - Consider nutrition services referral to assist patient with adequate nutrition and appropriate food choices  Outcome: Progressing  Goal: Maintains or returns to baseline bowel function  Description: INTERVENTIONS:  - Assess bowel function  - Encourage oral fluids to ensure adequate hydration  - Administer IV fluids if ordered to ensure adequate hydration  - Administer ordered medications as needed  - Encourage mobilization and activity  - Consider nutritional services referral to assist patient with adequate nutrition and appropriate food choices  Outcome: Progressing  Goal: Maintains adequate nutritional intake  Description: INTERVENTIONS:  - Monitor percentage of each meal consumed  - Identify factors contributing to decreased intake, treat as appropriate  - Assist with meals as needed  - Monitor I&O, weight, and lab values if indicated  - Obtain nutrition services referral as needed  Outcome: Progressing     Problem: GENITOURINARY - ADULT  Goal: Maintains or returns to baseline urinary function  Description: INTERVENTIONS:  - Assess urinary function  - Encourage oral fluids to ensure adequate hydration if ordered  - Administer IV fluids as ordered to ensure adequate hydration  - Administer ordered medications as needed  - Offer frequent toileting  - Follow urinary retention protocol if ordered  Outcome: Progressing  Goal: Absence of urinary retention  Description: INTERVENTIONS:  - Assess patients ability to void and empty bladder  - Monitor I/O  - Bladder scan as needed  - Discuss with physician/AP medications to alleviate retention as needed  - Discuss catheterization for long term situations as appropriate  Outcome: Progressing  Goal: Urinary catheter remains patent  Description: INTERVENTIONS:  - Assess patency of urinary catheter  - If patient has a chronic mehta, consider changing catheter if non-functioning  - Follow guidelines for intermittent irrigation of non-functioning urinary catheter  Outcome: Progressing     Problem: METABOLIC, FLUID AND ELECTROLYTES - ADULT  Goal: Electrolytes maintained within normal limits  Description: INTERVENTIONS:  - Monitor labs and assess patient for signs and symptoms of electrolyte imbalances  - Administer electrolyte replacement as ordered  - Monitor response to electrolyte replacements, including repeat lab results as appropriate  - Instruct patient on fluid and nutrition as appropriate  Outcome: Progressing  Goal: Fluid balance maintained  Description: INTERVENTIONS:  - Monitor labs   - Monitor I/O and WT  - Instruct patient on fluid and nutrition as appropriate  - Assess for signs & symptoms of volume excess or deficit  Outcome: Progressing     Problem: PAIN - ADULT  Goal: Verbalizes/displays adequate comfort level or baseline comfort level  Description: Interventions:  - Encourage patient to monitor pain and request assistance  - Assess pain using appropriate pain scale  - Administer analgesics based on type and severity of pain and evaluate response  - Implement non-pharmacological measures as appropriate and evaluate response  - Consider cultural and social influences on pain and pain management  - Notify physician/advanced practitioner if interventions unsuccessful or patient reports new pain  Outcome: Progressing     Problem: INFECTION - ADULT  Goal: Absence or prevention of progression during hospitalization  Description: INTERVENTIONS:  - Assess and monitor for signs and symptoms of infection  - Monitor lab/diagnostic results  - Monitor all insertion sites, i e  indwelling lines, tubes, and drains  - Monitor endotracheal if appropriate and nasal secretions for changes in amount and color  - [place] appropriate cooling/warming therapies per order  - Administer medications as ordered  - Instruct and encourage patient and family to use good hand hygiene technique  - Identify and instruct in appropriate isolation precautions for identified infection/condition  Outcome: Progressing  Goal: Absence of fever/infection during neutropenic period  Description: INTERVENTIONS:  - Monitor WBC    Outcome: Progressing     Problem: SAFETY ADULT  Goal: Patient will remain free of falls  Description: INTERVENTIONS:  - Educate patient/family on patient safety including physical limitations  - Instruct patient to call for assistance with activity   - Consult OT/PT to assist with strengthening/mobility   - Keep Call bell within reach  - Keep bed low and locked with side rails adjusted as appropriate  - Keep care items and personal belongings within reach  - Initiate and maintain comfort rounds  - Make Fall Risk Sign visible to staff  - Offer Toileting every 2 Hours, in advance of need  - Initiate/Maintain bed alarm  - Obtain necessary fall risk management equipment:   - Apply yellow socks and bracelet for high fall risk patients  - Consider moving patient to room near nurses station  Outcome: Progressing  Goal: Maintain or return to baseline ADL function  Description: INTERVENTIONS:  - Educate patient/family on patient safety including physical limitations  - Instruct patient to call for assistance with activity   - Consult OT/PT to assist with strengthening/mobility   - Keep Call bell within reach  - Keep bed low and locked with side rails adjusted as appropriate  - Keep care items and personal belongings within reach  - Initiate and maintain comfort rounds  - Make Fall Risk Sign visible to staff  - Offer Toileting every 2 Hours, in advance of need  - Initiate/Maintain bed alarm  - Obtain necessary fall risk management equipment:   - Apply yellow socks and bracelet for high fall risk patients  - Consider moving patient to room near nurses station  Outcome: Progressing  Goal: Maintains/Returns to pre admission functional level  Description: INTERVENTIONS:  -  Assess patient's ability to carry out ADLs; assess patient's baseline for ADL function and identify physical deficits which impact ability to perform ADLs (bathing, care of mouth/teeth, toileting, grooming, dressing, etc )  - Assess/evaluate cause of self-care deficits   - Assess range of motion  - Assess patient's mobility; develop plan if impaired  - Assess patient's need for assistive devices and provide as appropriate  - Encourage maximum independence but intervene and supervise when necessary  - Involve family in performance of ADLs  - Assess for home care needs following discharge   - Consider OT consult to assist with ADL evaluation and planning for discharge  - Provide patient education as appropriate  Outcome: Progressing     Problem: SAFETY ADULT  Goal: Patient will remain free of falls  Description: INTERVENTIONS:  - Educate patient/family on patient safety including physical limitations  - Instruct patient to call for assistance with activity   - Consult OT/PT to assist with strengthening/mobility   - Keep Call bell within reach  - Keep bed low and locked with side rails adjusted as appropriate  - Keep care items and personal belongings within reach  - Initiate and maintain comfort rounds  - Make Fall Risk Sign visible to staff  - Offer Toileting every 2 Hours, in advance of need  - Initiate/Maintain bed alarm  - Obtain necessary fall risk management equipment:   - Apply yellow socks and bracelet for high fall risk patients  - Consider moving patient to room near nurses station  Outcome: Progressing  Goal: Maintain or return to baseline ADL function  Description: INTERVENTIONS:  - Educate patient/family on patient safety including physical limitations  - Instruct patient to call for assistance with activity   - Consult OT/PT to assist with strengthening/mobility   - Keep Call bell within reach  - Keep bed low and locked with side rails adjusted as appropriate  - Keep care items and personal belongings within reach  - Initiate and maintain comfort rounds  - Make Fall Risk Sign visible to staff  - Offer Toileting every 2 Hours, in advance of need  - Initiate/Maintain bed alarm  - Obtain necessary fall risk management equipment:   - Apply yellow socks and bracelet for high fall risk patients  - Consider moving patient to room near nurses station  Outcome: Progressing  Goal: Maintains/Returns to pre admission functional level  Description: INTERVENTIONS:  -  Assess patient's ability to carry out ADLs; assess patient's baseline for ADL function and identify physical deficits which impact ability to perform ADLs (bathing, care of mouth/teeth, toileting, grooming, dressing, etc )  - Assess/evaluate cause of self-care deficits   - Assess range of motion  - Assess patient's mobility; develop plan if impaired  - Assess patient's need for assistive devices and provide as appropriate  - Encourage maximum independence but intervene and supervise when necessary  - Involve family in performance of ADLs  - Assess for home care needs following discharge   - Consider OT consult to assist with ADL evaluation and planning for discharge  - Provide patient education as appropriate  Outcome: Progressing     Problem: DISCHARGE PLANNING  Goal: Discharge to home or other facility with appropriate resources  Description: INTERVENTIONS:  - Identify barriers to discharge w/patient and caregiver  - Arrange for needed discharge resources and transportation as appropriate  - Identify discharge learning needs (meds, wound care, etc )  - Arrange for interpretive services to assist at discharge as needed  - Refer to Case Management Department for coordinating discharge planning if the patient needs post-hospital services based on physician/advanced practitioner order or complex needs related to functional status, cognitive ability, or social support system  Outcome: Progressing     Problem: Knowledge Deficit  Goal: Patient/family/caregiver demonstrates understanding of disease process, treatment plan, medications, and discharge instructions  Description: Complete learning assessment and assess knowledge base  Interventions:  - Provide teaching at level of understanding  - Provide teaching via preferred learning methods  Outcome: Progressing     Problem: Knowledge Deficit  Goal: Patient/family/caregiver demonstrates understanding of disease process, treatment plan, medications, and discharge instructions  Description: Complete learning assessment and assess knowledge base    Interventions:  - Provide teaching at level of understanding  - Provide teaching via preferred learning methods  Outcome: Progressing

## 2022-09-27 NOTE — PROGRESS NOTES
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 and hypertensive nephrosclerosis, cardiorenal syndrome and multiple myeloma  · Acute kidney injury etiology:  Pre renal azotemia/large volume GI output which likely converted to ATN  Severe azotemia and oliguria, recent DORA  · Started on hemodialysis 9/16  · Tunneled catheter placed on 09/22/2022  · Currently on a Monday, Wednesday, Friday hemodialysis schedule  · Last hemodialysis treatment 9/26   2 L fluid removal  · Plan:  · Next hemodialysis treatment tomorrow  · No evidence of renal recovery as yet  · Monitor closely, avoid nephrotoxic agents, hypotension    Blood pressure/volume status:  · Midodrine 10 mg t i d  added to assist with volume removal/blood pressure maintenance on hemodialysis  · Blood pressure on the low side  Continue midodrine    Acute diverticulitis:  · Large volume stool output, fecal management system in place  · C diff negative  · Management per primary team  · On Questran    Distended gallbladder with cholelithiasis:  · Right upper quadrant ultrasound:  Gallbladder distended, gallstones, sludge noted  Liver moderately enlarged  · Not a candidate for surgical intervention  · May need cholecystostomy tube    Chronic systolic and diastolic combined CHF:  · Ejection fraction 45%, concentric hypertrophy  Diffuse hypokinesis with regional variations    · Volume removal on hemodialysis  · No evidence of decompensation    Anemia:  · Hemoglobin stable, below baseline, currently 8 0  · Iron deficient:  Iron saturation 10%  On Venofer 100 mg with each hemodialysis treatment times 10 doses  · Transfuse for hemoglobin less than 7    Multiple myeloma:  · On Velcade and Decadron  · Follows with Dr Bishop Barrett    CKD MBD:  · Hyperphosphatemia:  On PhosLo, low phosphorus diet    Hypokalemia  · Replete    Other:  · Chronic atrial fibrillation  · Hiatal hernia:  EGD today  No Demian lesions noted  3 mm polyp removed  Biliary stents in place draining  Concern for gastroparesis  · Morbid obesity  · Hyperlipidemia  · COPD  · Gout  · Diabetes mellitus type 2    DISPOSITION:  Next hemodialysis treatment tomorrow    SUBJECTIVE / 24H INTERVAL HISTORY:  Irritable, complaining about diet  Stating he has not received help ordering food although I checked with dietary and they took his order for lunch already  No overnight events reported    OBJECTIVE:  Current Weight: Weight - Scale: 121 kg (267 lb 3 2 oz)  Vitals:    09/26/22 2118 09/27/22 0537 09/27/22 0558 09/27/22 0947   BP: 120/66  (!) 84/44 112/55   BP Location:       Pulse: 79  67 65   Resp:       Temp: 99 6 °F (37 6 °C)   99 3 °F (37 4 °C)   TempSrc:       SpO2: 96%  91% 92%   Weight:  121 kg (267 lb 3 2 oz)     Height:           Intake/Output Summary (Last 24 hours) at 9/27/2022 1106  Last data filed at 9/27/2022 0601  Gross per 24 hour   Intake 722 ml   Output 3700 ml   Net -2978 ml     General:  Lying in bed appears comfortable at rest   Skin:  Slightly pale-appearing, skin warm and dry  No rash  Eyes:  Sclera clear  ENT:  Oropharynx moist  Neck:  Supple  Chest:  Clear anteriorly, normal effort  On room air  PermCath in place    Dressing dry and intact  CVS:  Normal heart rate, regular rhythm  Abdomen:  Abdomen obese, normal bowel sounds noted  Extremities:  No significant edema  Neuro:  No acute deficits, forgetful  Psych:  Irritable  Medications:    Current Facility-Administered Medications:     acetaminophen (TYLENOL) tablet 650 mg, 650 mg, Oral, Q6H PRN, Iraida Leija MD    albuterol (PROVENTIL HFA,VENTOLIN HFA) inhaler 2 puff, 2 puff, Inhalation, Q6H PRN, Iraida Leija MD    allopurinol (ZYLOPRIM) tablet 300 mg, 300 mg, Oral, Daily, Iraida Leija MD, 300 mg at 09/27/22 0857    atorvastatin (LIPITOR) tablet 40 mg, 40 mg, Oral, After Terrell Solares MD, 40 mg at 09/26/22 1709    b complex-vitamin C-folic acid (NEPHROCAPS) capsule 1 capsule, 1 capsule, Oral, Daily With Mell Banks MD, 1 capsule at 09/26/22 1625    calcium acetate (PHOSLO) capsule 667 mg, 667 mg, Oral, TID With Meals, Iraida Leija MD, 667 mg at 09/27/22 0859    cholestyramine sugar free (QUESTRAN LIGHT) packet 4 g, 4 g, Oral, BID, Iraida Leija MD, 4 g at 09/27/22 0600    clotrimazole (LOTRIMIN) 1 % cream, , Topical, BID, Iraida Leija MD, 1 application at 70/49/35 0900    dicyclomine (BENTYL) capsule 10 mg, 10 mg, Oral, 4x Daily (AC & HS), Iraida Leija MD, 10 mg at 09/27/22 0600    fluticasone-vilanterol (BREO ELLIPTA) 200-25 MCG/INH inhaler 1 puff, 1 puff, Inhalation, Daily, Iraida Leija MD, 1 puff at 09/27/22 0859    insulin lispro (HumaLOG) 100 units/mL subcutaneous injection 1-6 Units, 1-6 Units, Subcutaneous, TID With Meals, 1 Units at 09/25/22 1722 **AND** Fingerstick Glucose (POCT), , , 4x Daily AC and at bedtime, Iraida Leija MD    loperamide (IMODIUM) capsule 2 mg, 2 mg, Oral, 4x Daily PRN, Iraida Leija MD, 2 mg at 09/23/22 1221    melatonin tablet 6 mg, 6 mg, Oral, HS, Iraida Leija MD, 6 mg at 09/26/22 2111    meropenem (MERREM) 500 mg in sodium chloride 0 9 % 50 mL IVPB, 500 mg, Intravenous, Q24H, Iraida Leija MD, Last Rate: 100 mL/hr at 09/26/22 1621, 500 mg at 09/26/22 1621    metoclopramide (REGLAN) injection 10 mg, 10 mg, Intravenous, BID AC, NICOLA Atwood    metoclopramide (REGLAN) injection 5 mg, 5 mg, Intravenous, Q12H PRN, Iraida Leija MD    midodrine (PROAMATINE) tablet 10 mg, 10 mg, Oral, TID AC, Iraida Leija MD, 10 mg at 09/27/22 0600   ondansetron (ZOFRAN) injection 4 mg, 4 mg, Intravenous, Q6H PRN, Carmel Escobar MD, 4 mg at 09/26/22 1023    pantoprazole (PROTONIX) injection 40 mg, 40 mg, Intravenous, Q12H [address], Carmel Escobar MD, 40 mg at 09/27/22 0857    trimethobenzamide (TIGAN) IM injection 200 mg, 200 mg, Intramuscular, Q6H PRN, Carmel Escobar MD, 200 mg at 09/23/22 1004    vancomycin (VANCOCIN) capsule 125 mg, 125 mg, Oral, BID, Carmel Escobar MD, 125 mg at 09/27/22 0857    Laboratory Results:  Results from last 7 days   Lab Units 09/27/22  0530 09/26/22  0448 09/25/22  2320 09/25/22  0509 09/25/22  0446 09/24/22  0446 09/23/22  1132 09/23/22  0407 09/22/22  0449 09/21/22  0518   WBC Thousand/uL 7 03 9 95 10 69*  --  8 81 11 63*  --  10 21* 6 70 4 93   HEMOGLOBIN g/dL 8 0* 8 2* 8 3*  --  8 5* 8 5* 10 0* 9 3* 8 9* 8 1*   HEMATOCRIT % 27 0* 26 9* 27 1*  --  27 8* 28 1* 32 1* 29 9* 29 6* 26 8*   PLATELETS Thousands/uL 232 199 201  --  189 163  --  208 225 225   POTASSIUM mmol/L 3 3*  --  3 8 3 7  --  4 3  --  3 8 3 8 3 6   CHLORIDE mmol/L 97  --  96 98  --  98  --  98 101 102   CO2 mmol/L 28  --  26 28  --  26  --  27 25 23   BUN mg/dL 26*  --  38* 31*  --  21  --  22 14 24   CREATININE mg/dL 5 88*  --  7 69* 6 68*  --  4 92*  --  5 84* 4 31* 5 84*   CALCIUM mg/dL 8 8  --  8 6 8 8  --  8 8  --  8 8 8 5 8 7   MAGNESIUM mg/dL  --  2 0  --   --  2 2 2 1  --  1 6 2 1 1 7   PHOSPHORUS mg/dL  --   --   --  4 8*  --  3 4  --  4 4* 3 8 5 4*

## 2022-09-27 NOTE — ASSESSMENT & PLAN NOTE
· Baseline hgb 8  · Receives IV venofer  · Trend H&H  · Transfuse for hgb <7  · Patient underwent EGD on 09/26 which showed One 3 mm polyp in the stomach removed with biopsy forceps  Large sliding hiatal hernia without Demian lesions     Biliary stents in place draining  Dilation of the stomach with large amount of retained   liquid upon entrance to the stomach   Suctioned out  Prudence [place] for element of   Gastroparesis  · Started on low-dose Reglan and continue Protonix

## 2022-09-27 NOTE — ASSESSMENT & PLAN NOTE
· DORA on CKD 3  · Most recent d/c creat 3-3 7 prior in 2 range  · CT no hydro, nonobstructing calculi  · UA neg  · Nephrology following  · 9/15 R tunneled HD cath placed  · 9/15 and 9/16 IHD  · Monitor for renal recovery remains oliguric  · S/p IHD on/ 9/19   · Currently on m/w/f schedule with no signs of renal recovery  · Temporary to PermCath conversion 09/21/2022  · No signs of renal recovery  · S/P HD yesterday  · Nephrology on board

## 2022-09-28 ENCOUNTER — APPOINTMENT (INPATIENT)
Dept: DIALYSIS | Facility: HOSPITAL | Age: 72
DRG: 674 | End: 2022-09-28
Attending: INTERNAL MEDICINE
Payer: COMMERCIAL

## 2022-09-28 ENCOUNTER — APPOINTMENT (OUTPATIENT)
Dept: NUCLEAR MEDICINE | Facility: HOSPITAL | Age: 72
DRG: 674 | End: 2022-09-28
Payer: COMMERCIAL

## 2022-09-28 LAB
ALBUMIN SERPL BCP-MCNC: 2.2 G/DL (ref 3.5–5)
ALP SERPL-CCNC: 145 U/L (ref 46–116)
ALT SERPL W P-5'-P-CCNC: 12 U/L (ref 12–78)
ANION GAP SERPL CALCULATED.3IONS-SCNC: 15 MMOL/L (ref 4–13)
AST SERPL W P-5'-P-CCNC: 18 U/L (ref 5–45)
BASOPHILS # BLD AUTO: 0.07 THOUSANDS/ΜL (ref 0–0.1)
BASOPHILS NFR BLD AUTO: 1 % (ref 0–1)
BILIRUB SERPL-MCNC: 0.5 MG/DL (ref 0.2–1)
BUN SERPL-MCNC: 38 MG/DL (ref 5–25)
CALCIUM ALBUM COR SERPL-MCNC: 10.2 MG/DL (ref 8.3–10.1)
CALCIUM SERPL-MCNC: 8.8 MG/DL (ref 8.3–10.1)
CHLORIDE SERPL-SCNC: 97 MMOL/L (ref 96–108)
CO2 SERPL-SCNC: 24 MMOL/L (ref 21–32)
CREAT SERPL-MCNC: 7.81 MG/DL (ref 0.6–1.3)
EOSINOPHIL # BLD AUTO: 0.24 THOUSAND/ΜL (ref 0–0.61)
EOSINOPHIL NFR BLD AUTO: 3 % (ref 0–6)
ERYTHROCYTE [DISTWIDTH] IN BLOOD BY AUTOMATED COUNT: 17.2 % (ref 11.6–15.1)
GFR SERPL CREATININE-BSD FRML MDRD: 6 ML/MIN/1.73SQ M
GLUCOSE SERPL-MCNC: 114 MG/DL (ref 65–140)
GLUCOSE SERPL-MCNC: 143 MG/DL (ref 65–140)
GLUCOSE SERPL-MCNC: 145 MG/DL (ref 65–140)
GLUCOSE SERPL-MCNC: 148 MG/DL (ref 65–140)
GLUCOSE SERPL-MCNC: 157 MG/DL (ref 65–140)
HCT VFR BLD AUTO: 27.5 % (ref 36.5–49.3)
HGB BLD-MCNC: 8.2 G/DL (ref 12–17)
IMM GRANULOCYTES # BLD AUTO: 0.06 THOUSAND/UL (ref 0–0.2)
IMM GRANULOCYTES NFR BLD AUTO: 1 % (ref 0–2)
INR PPP: 1.1 (ref 0.84–1.19)
LYMPHOCYTES # BLD AUTO: 1.19 THOUSANDS/ΜL (ref 0.6–4.47)
LYMPHOCYTES NFR BLD AUTO: 16 % (ref 14–44)
MCH RBC QN AUTO: 25.7 PG (ref 26.8–34.3)
MCHC RBC AUTO-ENTMCNC: 29.8 G/DL (ref 31.4–37.4)
MCV RBC AUTO: 86 FL (ref 82–98)
MONOCYTES # BLD AUTO: 0.74 THOUSAND/ΜL (ref 0.17–1.22)
MONOCYTES NFR BLD AUTO: 10 % (ref 4–12)
NEUTROPHILS # BLD AUTO: 5.03 THOUSANDS/ΜL (ref 1.85–7.62)
NEUTS SEG NFR BLD AUTO: 69 % (ref 43–75)
NRBC BLD AUTO-RTO: 0 /100 WBCS
PLATELET # BLD AUTO: 318 THOUSANDS/UL (ref 149–390)
PMV BLD AUTO: 10.5 FL (ref 8.9–12.7)
POTASSIUM SERPL-SCNC: 3.5 MMOL/L (ref 3.5–5.3)
PROT SERPL-MCNC: 6.6 G/DL (ref 6.4–8.4)
PROTHROMBIN TIME: 14.9 SECONDS (ref 11.6–14.5)
RBC # BLD AUTO: 3.19 MILLION/UL (ref 3.88–5.62)
SODIUM SERPL-SCNC: 136 MMOL/L (ref 135–147)
WBC # BLD AUTO: 7.33 THOUSAND/UL (ref 4.31–10.16)

## 2022-09-28 PROCEDURE — 85025 COMPLETE CBC W/AUTO DIFF WBC: CPT | Performed by: NURSE PRACTITIONER

## 2022-09-28 PROCEDURE — 0F9430Z DRAINAGE OF GALLBLADDER WITH DRAINAGE DEVICE, PERCUTANEOUS APPROACH: ICD-10-PCS | Performed by: INTERNAL MEDICINE

## 2022-09-28 PROCEDURE — A9537 TC99M MEBROFENIN: HCPCS

## 2022-09-28 PROCEDURE — NC001 PR NO CHARGE: Performed by: RADIOLOGY

## 2022-09-28 PROCEDURE — 99232 SBSQ HOSP IP/OBS MODERATE 35: CPT | Performed by: INTERNAL MEDICINE

## 2022-09-28 PROCEDURE — 85610 PROTHROMBIN TIME: CPT | Performed by: INTERNAL MEDICINE

## 2022-09-28 PROCEDURE — G1004 CDSM NDSC: HCPCS

## 2022-09-28 PROCEDURE — 90935 HEMODIALYSIS ONE EVALUATION: CPT | Performed by: INTERNAL MEDICINE

## 2022-09-28 PROCEDURE — 82948 REAGENT STRIP/BLOOD GLUCOSE: CPT

## 2022-09-28 PROCEDURE — 80053 COMPREHEN METABOLIC PANEL: CPT | Performed by: NURSE PRACTITIONER

## 2022-09-28 PROCEDURE — 78227 HEPATOBIL SYST IMAGE W/DRUG: CPT

## 2022-09-28 RX ORDER — DIPHENOXYLATE HYDROCHLORIDE AND ATROPINE SULFATE 2.5; .025 MG/1; MG/1
1 TABLET ORAL EVERY 12 HOURS
Status: DISCONTINUED | OUTPATIENT
Start: 2022-09-28 | End: 2022-10-04 | Stop reason: HOSPADM

## 2022-09-28 RX ORDER — MORPHINE SULFATE 4 MG/ML
4 INJECTION, SOLUTION INTRAMUSCULAR; INTRAVENOUS ONCE
Status: COMPLETED | OUTPATIENT
Start: 2022-09-28 | End: 2022-09-28

## 2022-09-28 RX ORDER — POTASSIUM CHLORIDE 20 MEQ/1
20 TABLET, EXTENDED RELEASE ORAL ONCE
Status: COMPLETED | OUTPATIENT
Start: 2022-09-28 | End: 2022-09-28

## 2022-09-28 RX ADMIN — DICYCLOMINE HYDROCHLORIDE 10 MG: 10 CAPSULE ORAL at 06:23

## 2022-09-28 RX ADMIN — MIDODRINE HYDROCHLORIDE 10 MG: 5 TABLET ORAL at 15:47

## 2022-09-28 RX ADMIN — NEPHROCAP 1 CAPSULE: 1 CAP ORAL at 16:51

## 2022-09-28 RX ADMIN — MORPHINE SULFATE 4 MG: 4 INJECTION INTRAVENOUS at 10:04

## 2022-09-28 RX ADMIN — METOCLOPRAMIDE 10 MG: 5 INJECTION, SOLUTION INTRAMUSCULAR; INTRAVENOUS at 06:19

## 2022-09-28 RX ADMIN — PANTOPRAZOLE SODIUM 40 MG: 40 TABLET, DELAYED RELEASE ORAL at 16:51

## 2022-09-28 RX ADMIN — MEROPENEM 500 MG: 500 INJECTION, POWDER, FOR SOLUTION INTRAVENOUS at 16:50

## 2022-09-28 RX ADMIN — METOCLOPRAMIDE 10 MG: 5 INJECTION, SOLUTION INTRAMUSCULAR; INTRAVENOUS at 16:52

## 2022-09-28 RX ADMIN — FLUTICASONE FUROATE AND VILANTEROL TRIFENATATE 1 PUFF: 200; 25 POWDER RESPIRATORY (INHALATION) at 11:17

## 2022-09-28 RX ADMIN — PANTOPRAZOLE SODIUM 40 MG: 40 TABLET, DELAYED RELEASE ORAL at 06:23

## 2022-09-28 RX ADMIN — DIPHENOXYLATE HYDROCHLORIDE AND ATROPINE SULFATE 1 TABLET: 2.5; .025 TABLET ORAL at 13:06

## 2022-09-28 RX ADMIN — CLOTRIMAZOLE: 0.01 CREAM TOPICAL at 11:17

## 2022-09-28 RX ADMIN — CALCIUM ACETATE 667 MG: 667 CAPSULE ORAL at 16:51

## 2022-09-28 RX ADMIN — DIPHENOXYLATE HYDROCHLORIDE AND ATROPINE SULFATE 1 TABLET: 2.5; .025 TABLET ORAL at 22:57

## 2022-09-28 RX ADMIN — VANCOMYCIN HYDROCHLORIDE 125 MG: 125 CAPSULE ORAL at 11:22

## 2022-09-28 RX ADMIN — MIDODRINE HYDROCHLORIDE 10 MG: 5 TABLET ORAL at 06:23

## 2022-09-28 RX ADMIN — DICYCLOMINE HYDROCHLORIDE 10 MG: 10 CAPSULE ORAL at 11:16

## 2022-09-28 RX ADMIN — ALLOPURINOL 300 MG: 300 TABLET ORAL at 11:22

## 2022-09-28 RX ADMIN — DICYCLOMINE HYDROCHLORIDE 10 MG: 10 CAPSULE ORAL at 22:57

## 2022-09-28 RX ADMIN — CLOTRIMAZOLE: 0.01 CREAM TOPICAL at 17:50

## 2022-09-28 RX ADMIN — CALCIUM ACETATE 667 MG: 667 CAPSULE ORAL at 11:16

## 2022-09-28 RX ADMIN — ATORVASTATIN CALCIUM 40 MG: 40 TABLET, FILM COATED ORAL at 17:50

## 2022-09-28 RX ADMIN — VANCOMYCIN HYDROCHLORIDE 125 MG: 125 CAPSULE ORAL at 17:50

## 2022-09-28 RX ADMIN — MIDODRINE HYDROCHLORIDE 10 MG: 5 TABLET ORAL at 11:16

## 2022-09-28 RX ADMIN — CHOLESTYRAMINE 4 G: 4 POWDER, FOR SUSPENSION ORAL at 20:43

## 2022-09-28 RX ADMIN — Medication 6 MG: at 22:57

## 2022-09-28 RX ADMIN — POTASSIUM CHLORIDE 20 MEQ: 1500 TABLET, EXTENDED RELEASE ORAL at 15:49

## 2022-09-28 RX ADMIN — DICYCLOMINE HYDROCHLORIDE 10 MG: 10 CAPSULE ORAL at 16:51

## 2022-09-28 NOTE — ASSESSMENT & PLAN NOTE
· DORA on CKD 3  · Most recent d/c creat 3-3 7 prior in 2 range  · CT no hydro, nonobstructing calculi  · UA neg  · Nephrology following  · 9/15 R tunneled HD cath placed  · 9/15 and 9/16 IHD  · Monitor for renal recovery remains oliguric  · S/p IHD on/ 9/19   · Currently on m/w/f schedule with no signs of renal recovery  · Temporary to PermCath conversion 09/21/2022  · No signs of renal recovery  · Continue dialysis as per Nephrology  · Nephrology on board

## 2022-09-28 NOTE — PROGRESS NOTES
[facility] PROGRESS NOTE   Linda Olivarez 81 y o  male MRN: [id number]  Unit/Bed#: -01 Encounter: [id number]  Reason for Consult:  Acute kidney injury on CKD    ASSESSMENT and PLAN:  81-year-old male with history of multiple myeloma treated with Velcade, CKD, CHF, ejection fraction 45% presenting with acute diverticulitis  Nephrology following for management of acute kidney injury    Acute kidney injury (POA) on CKD stage IIIB:  · Baseline creatinine 1 2-1 4  · Underlying CKD due to diabetic nephropathy and hypertensive nephrosclerosis, cardiorenal syndrome and multiple myeloma  · Acute kidney injury etiology:  Pre renal azotemia/large volume GI output which likely converted to ATN  Severe azotemia and oliguria, recent DORA  · Started on hemodialysis 9/16  · Tunneled catheter placed on 09/22/2022  · Currently on a Monday, Wednesday, Friday hemodialysis schedule  · Plan:  · Hemodialysis this afternoon  · No biochemical evidence of renal recovery  · Avoid hypotension, nephrotoxic agents  Blood pressure/volume status:  · Midodrine 10 mg t i d  added to assist with volume removal/blood pressure maintenance on hemodialysis  · Blood pressure on the low side  Continue midodrine    Acute diverticulitis:  · Large volume stool output, fecal management system in place  · C diff negative  · Management per primary team  · On Questran    Distended gallbladder with cholelithiasis:  · Right upper quadrant ultrasound:  Gallbladder distended, gallstones, sludge noted  Liver moderately enlarged  · Not a candidate for surgical intervention  · May need cholecystostomy tube    Chronic systolic and diastolic combined CHF:  · Ejection fraction 45%, concentric hypertrophy  Diffuse hypokinesis with regional variations  · Volume removal on hemodialysis  · No evidence of decompensation    Anemia:  · Hemoglobin low but stable  Below baseline  · Not on Epogen, MM  · Iron deficient:  Iron saturation 10%  · Receiving loading dose of IV iron Venofer 100 mg x 10 doses  · Transfuse for hemoglobin less than 7    Multiple myeloma:  · On Velcade and Decadron  · Follows with Dr Cameron Sheldon    CKD MBD:  · Hyperphosphatemia:  On PhosLo, low phosphorus diet    Hypokalemia  · Current potassium level 3 5  Other:  · Chronic atrial fibrillation  · Hiatal hernia:  EGD today  No Demian lesions noted  3 mm polyp removed  Biliary stents in place draining  Concern for gastroparesis  · Morbid obesity  · Hyperlipidemia  · COPD  · Gout  · Diabetes mellitus type 2    DISPOSITION:  Hemodialysis today    SUBJECTIVE / 24H INTERVAL HISTORY:  No acute issues or complaints    OBJECTIVE:  Current Weight: Weight - Scale: 122 kg (269 lb 6 4 oz)  Vitals:    09/28/22 0932 09/28/22 1009 09/28/22 1027 09/28/22 1048   BP: 114/59 111/61 95/66 104/61   BP Location: Right arm Left arm Left arm Left arm   Pulse: 76 85 74 77   Resp: 18 18 20 20   Temp:       TempSrc:       SpO2: 94% 96% 95% 95%   Weight:       Height:           Intake/Output Summary (Last 24 hours) at 9/28/2022 1110  Last data filed at 9/28/2022 0656  Gross per 24 hour   Intake 120 ml   Output 1400 ml   Net -1280 ml     General:  Lying in bed  Chronically ill-appearing  Skin:  Pale-appearing, skin warm and dry, no rash  Eyes:  Sclera clear, anicteric  ENT:  Oropharynx moist  Neck:  Supple  Chest:  PermCath in place, chest clear anteriorly  Normal effort  CVS:  Normal heart rate, regular rhythm  Abdomen:  Abdomen protuberant, obese, normal bowel sounds  Extremities:  No significant edema  Difficult to assess due to body habitus  Neuro:  No acute motor deficits    Nonambulatory  Psych:  Calmly lying in bed  Medications:    Current Facility-Administered Medications:     acetaminophen (TYLENOL) tablet 650 mg, 650 mg, Oral, Q6H PRN, Angelica Raya MD    albuterol (PROVENTIL HFA,VENTOLIN HFA) inhaler 2 puff, 2 puff, Inhalation, Q6H PRN, Angelica Raya MD    allopurinol (ZYLOPRIM) tablet 300 mg, 300 mg, Oral, Daily, Divya Hoover MD, 300 mg at 09/27/22 0857    atorvastatin (LIPITOR) tablet 40 mg, 40 mg, Oral, After Jorge Lucero MD, 40 mg at 09/27/22 1704    b complex-vitamin C-folic acid (NEPHROCAPS) capsule 1 capsule, 1 capsule, Oral, Daily With Fuentes Blanco MD, 1 capsule at 09/27/22 1621    calcium acetate (PHOSLO) capsule 667 mg, 667 mg, Oral, TID With Meals, Divya Hoover MD, 667 mg at 09/27/22 1621    cholestyramine sugar free (QUESTRAN LIGHT) packet 4 g, 4 g, Oral, BID, Divya Hoover MD, 4 g at 09/27/22 2015    clotrimazole (LOTRIMIN) 1 % cream, , Topical, BID, Divya Hoover MD, 1 application at 44/33/18 1705    dicyclomine (BENTYL) capsule 10 mg, 10 mg, Oral, 4x Daily (AC & HS), Divya Hoover MD, 10 mg at 09/28/22 0623    fluticasone-vilanterol (BREO ELLIPTA) 200-25 MCG/INH inhaler 1 puff, 1 puff, Inhalation, Daily, Divya Hoover MD, 1 puff at 09/27/22 0859    insulin lispro (HumaLOG) 100 units/mL subcutaneous injection 1-6 Units, 1-6 Units, Subcutaneous, TID With Meals, 1 Units at 09/27/22 1621 **AND** Fingerstick Glucose (POCT), , , 4x Daily AC and at bedtime, Divya Hoover MD    loperamide (IMODIUM) capsule 2 mg, 2 mg, Oral, 4x Daily PRN, Divya Hoover MD, 2 mg at 09/23/22 1221    melatonin tablet 6 mg, 6 mg, Oral, HS, Divya Hoover MD, 6 mg at 09/27/22 2139    meropenem (MERREM) 500 mg in sodium chloride 0 9 % 50 mL IVPB, 500 mg, Intravenous, Q24H, Divya Hoover MD, Last Rate: 100 mL/hr at 09/27/22 1506, 500 mg at 09/27/22 1506    metoclopramide (REGLAN) injection 10 mg, 10 mg, Intravenous, BID AC, AZUL NoNP, 10 mg at 09/28/22 1172    metoclopramide (REGLAN) injection 5 mg, 5 mg, Intravenous, Q12H PRN, Divya Hoover MD    midodrine (PROAMATINE) tablet 10 mg, 10 mg, Oral, TID AC, Divya Hoover MD, 10 mg at 09/28/22 4228    ondansetron (ZOFRAN) injection 4 mg, 4 mg, Intravenous, Q6H PRN, Divya Hoover MD, 4 mg at 09/26/22 1023    pantoprazole (PROTONIX) EC tablet 40 mg, 40 mg, Oral, BID AC, Merry Ales, CRNP, 40 mg at 09/28/22 0087    trimethobenzamide (TIGAN) IM injection 200 mg, 200 mg, Intramuscular, Q6H PRN, Mirta Stubbs MD, 200 mg at 09/23/22 1004    vancomycin (VANCOCIN) capsule 125 mg, 125 mg, Oral, BID, Mirta Stubbs MD, 125 mg at 09/27/22 1704    Laboratory Results:  Results from last 7 days   Lab Units 09/28/22  0458 09/27/22  0530 09/26/22  0448 09/25/22  2320 09/25/22  0509 09/25/22  0446 09/24/22  0446 09/23/22  1132 09/23/22  0407 09/22/22  0449   WBC Thousand/uL 7 33 7 03 9 95 10 69*  --  8 81 11 63*  --  10 21* 6 70   HEMOGLOBIN g/dL 8 2* 8 0* 8 2* 8 3*  --  8 5* 8 5* 10 0* 9 3* 8 9*   HEMATOCRIT % 27 5* 27 0* 26 9* 27 1*  --  27 8* 28 1* 32 1* 29 9* 29 6*   PLATELETS Thousands/uL 318 232 199 201  --  189 163  --  208 225   POTASSIUM mmol/L 3 5 3 3*  --  3 8 3 7  --  4 3  --  3 8 3 8   CHLORIDE mmol/L 97 97  --  96 98  --  98  --  98 101   CO2 mmol/L 24 28  --  26 28  --  26  --  27 25   BUN mg/dL 38* 26*  --  38* 31*  --  21  --  22 14   CREATININE mg/dL 7 81* 5 88*  --  7 69* 6 68*  --  4 92*  --  5 84* 4 31*   CALCIUM mg/dL 8 8 8 8  --  8 6 8 8  --  8 8  --  8 8 8 5   MAGNESIUM mg/dL  --   --  2 0  --   --  2 2 2 1  --  1 6 2 1   PHOSPHORUS mg/dL  --   --   --   --  4 8*  --  3 4  --  4 4* 3 8

## 2022-09-28 NOTE — ASSESSMENT & PLAN NOTE
· 2nd episode in one month (tx with 10 days antibx 8/2-8/11)  · CTAP acute diverticulitis  · GI following  · 7/25 Colonoscopy--2 polyps removed, Mild diverticula in the descending colon and sigmoid colon, Small, internal hemorrhoids  · 9/22 finished 10 day course of zosyn  · Questran started 9/17   · Imodium PRN  · Bentyl added 9/18 by GI  · advance diet per gi recommendations  · Gi suspects  multifactorial probably were related to the antibiotics that he is receiving for the diverticulitis as well as a chemo effect from his myeloma  May need to hold chemo on discharge for a period of time  Treatment at this point is supportive  · Monitor rectal tube output, Stool appears much darker today  · After discontinuation of Zosyn the following day on 09/23 patient started to develop leukocytosis with elevated procalcitonin  · Consulted Infectious Disease  · Id recommended checking for C diff - pending  · 9/24 Started patient on p o  Vancomycin and Zosyn  · ID switched patient to [address] [place] [address]    Continue Merrem and oral vancomycin

## 2022-09-28 NOTE — ASSESSMENT & PLAN NOTE
· Baseline hgb 8  · Receives IV venofer  · Trend H&H  · Transfuse for hgb <7  · Patient underwent EGD on 09/26 which showed One 3 mm polyp in the stomach removed with biopsy forceps  Large sliding hiatal hernia without Demian lesions     Biliary stents in place draining  Dilation of the stomach with large amount of retained   liquid upon entrance to the stomach   Suctioned out  Lito Corona for element of   Gastroparesis  · Started on low-dose Reglan and continue Protonix

## 2022-09-28 NOTE — CONSULTS
e-Consult (IPC)  - Interventional Radiology  Kirstin Chacko 81 y o  male MRN: [id number]  Unit/Bed#: -01 Encounter: [id number]          Interventional Radiology has been consulted to evaluate Kirstin Chacko    IP Consult to IR  Consult performed by: Laura Bee MD  Consult ordered by: Glenys Schroeder MD        09/28/22    Assessment/Recommendation:   82-year-old male with history of CHF, diabetes, atrial fib, ESRD on hemodialysis, multiple myeloma presenting with abdominal pain and found to have cholecystitis, likely chronic  Patient is not a surgical candidate and is referred for cholecystostomy tube placement  - plan for cholecystostomy tube placement tomorrow  - please keep patient NPO after midnight  - hold blood thinners    5-10 minutes, >50% of the total time devoted to medical consultative verbal/EMR discussion between providers  Written report will be generated in the EMR  Thank you for allowing Interventional Radiology to participate in the care of Kirstin Chacko  Please don't hesitate to call or TigerText us with any questions       Laura Bee

## 2022-09-28 NOTE — PLAN OF CARE
Serum potassium is 3 5 on 4 k bath  Will attempt for UF-2 liters as tolerated and discussed with Dr Thor Aase  Problem: METABOLIC, FLUID AND ELECTROLYTES - ADULT  Goal: Electrolytes maintained within normal limits  Description: INTERVENTIONS:  - Monitor labs and assess patient for signs and symptoms of electrolyte imbalances  - Administer electrolyte replacement as ordered  - Monitor response to electrolyte replacements, including repeat lab results as appropriate  - Instruct patient on fluid and nutrition as appropriate  Outcome: Progressing  Goal: Fluid balance maintained  Description: INTERVENTIONS:  - Monitor labs   - Monitor I/O and WT  - Instruct patient on fluid and nutrition as appropriate  - Assess for signs & symptoms of volume excess or deficit  Outcome: Progressing   Post-Dialysis RN Treatment Note    Blood Pressure:  Pre 113/55 mm/Hg  Post 115/68 mmHg   EDW  tbd kg    Weight:  Pre 119 9 kg   Post 117 9 kg   Mode of weight measurement: Bed Scale   Volume Removed  2000 ml    Treatment duration 210 minutes    NS given  No    Treatment shortened?  Yes, describe: as it was discussed with Dr Thor Aase   Medications given during Rx Not Applicable   Estimated Kt/V  None Reported   Access type: Permacath/TDC   Access Issues: No    Report called to primary nurse   Yes              Germán Hutchinson RN

## 2022-09-28 NOTE — ASSESSMENT & PLAN NOTE
· CTAP-common bile duct stent with pneumobilia and air within gallblader  Distended gallbladder with cholelithiasis  · RUQ u/s distended gallbladder with air within stent in CBD similar to CT  · Surgery consulted  If concern for acute cholecystitis not surgical candidate would need per vanessa tube  · GI following  · ERCP 8/29/2022, Dr Rogena Spatz, Veronica Montanez esophagus seen during EGD, biopsy obtained   ERCP performed with endoscopic mucosal resection of abnormal looking ampullary tissue, PD, CBD stent placed    · No s/s acute cholecystitis monitor   · Surgical and GI follow-up noted

## 2022-09-28 NOTE — ASSESSMENT & PLAN NOTE
9/23 this morning patient started to develop some nausea and vomiting, vomiting noted to be dark in color, no dark stools noted in rectal tube  Gi started patient on IV PPI b i d  Clear liquid diet  Most likely secondary to large hiatal hernia on previous EGD  9/24 rectal tube noted to have dark stools  N/V resolved, continues to have abdominal tenderness  Hg 9 3->10->8 5- 8 0-8 2  CT abd/pelvis- Distended gallbladder with cholelithiasis, wall thickening, and pericholecystic inflammatory change  There appear to be calculi extending into the cystic duct  Findings can be due to acute cholecystitis in the appropriate clinical setting  Consider gallbladder ultrasound  2  Previously seen inflammatory changes related to acute sigmoid diverticulitis are no longer visualized "  RUQ US- " Again seen is a distended gallbladder with gallbladder wall thickening up to 15 mm, also containing intraluminal air, gallstones, sludge  Sonographic Lentz's sign is negative "  Surgical follow-up noted  Patient is undergoing HIDA scan today  Patient does not have any abdominal pain and as per surgery if patient becomes symptomatic and they recommend cholecystomy tube placement as patient is not a surgical candidate at this time

## 2022-09-28 NOTE — PROGRESS NOTES
[place]  Progress Note - Sumit Sickle 1950, 81 y o  male MRN: [id number]  Unit/Bed#: [facility]-01 Encounter: [id number]  Primary Care Provider: Dave Samuels MD   Date and time admitted to hospital: 9/12/2022  4:09 PM    Nausea and vomiting  Assessment & Plan  9/23 this morning patient started to develop some nausea and vomiting, vomiting noted to be dark in color, no dark stools noted in rectal tube  Gi started patient on IV PPI b i d  Clear liquid diet  Most likely secondary to large hiatal hernia on previous EGD  9/24 rectal tube noted to have dark stools  N/V resolved, continues to have abdominal tenderness  Hg 9 3->10->8 5- 8 0-8 2  CT abd/pelvis- Distended gallbladder with cholelithiasis, wall thickening, and pericholecystic inflammatory change  There appear to be calculi extending into the cystic duct  Findings can be due to acute cholecystitis in the appropriate clinical setting  Consider gallbladder ultrasound  2  Previously seen inflammatory changes related to acute sigmoid diverticulitis are no longer visualized "  RUQ US- " Again seen is a distended gallbladder with gallbladder wall thickening up to 15 mm, also containing intraluminal air, gallstones, sludge  Sonographic Lentz's sign is negative "  Surgical follow-up noted  Patient is undergoing HIDA scan today  Patient does not have any abdominal pain and as per surgery if patient becomes symptomatic and they recommend cholecystomy tube placement as patient is not a surgical candidate at this time    Abnormal CT scan  Assessment & Plan  · CTAP-common bile duct stent with pneumobilia and air within gallblader  Distended gallbladder with cholelithiasis  · RUQ u/s distended gallbladder with air within stent in CBD similar to CT  · Surgery consulted   If concern for acute cholecystitis not surgical candidate would need per vanessa tube  · GI following  · ERCP 8/29/2022, Pam Parker esophagus seen during EGD, biopsy obtained   ERCP performed with endoscopic mucosal resection of abnormal looking ampullary tissue, PD, CBD stent placed  · No s/s acute cholecystitis monitor   · Surgical and GI follow-up noted    Acute diverticulitis  Assessment & Plan  · 2nd episode in one month (tx with 10 days antibx 8/2-8/11)  · CTAP acute diverticulitis  · GI following  · 7/25 Colonoscopy--2 polyps removed, Mild diverticula in the descending colon and sigmoid colon, Small, internal hemorrhoids  · 9/22 finished 10 day course of zosyn  · Questran started 9/17   · Imodium PRN  · Bentyl added 9/18 by GI  · advance diet per gi recommendations  · Gi suspects  multifactorial probably were related to the antibiotics that he is receiving for the diverticulitis as well as a chemo effect from his myeloma  May need to hold chemo on discharge for a period of time  Treatment at this point is supportive  · Monitor rectal tube output, Stool appears much darker today  · After discontinuation of Zosyn the following day on 09/23 patient started to develop leukocytosis with elevated procalcitonin  · Consulted Infectious Disease  · Id recommended checking for C diff - pending  · 9/24 Started patient on p o  Vancomycin and Zosyn  · ID switched patient to [address] [place] [address]  Continue Merrem and oral vancomycin    Anemia  Assessment & Plan  · Baseline hgb 8  · Receives IV venofer  · Trend H&H  · Transfuse for hgb <7  · Patient underwent EGD on 09/26 which showed One 3 mm polyp in the stomach removed with biopsy forceps  Large sliding hiatal hernia without Demian lesions     Biliary stents in place draining  Dilation of the stomach with large amount of retained   liquid upon entrance to the stomach   Suctioned out  Mere Mares for element of   Gastroparesis  · Started on low-dose Reglan and continue Protonix      Ambulatory dysfunction  Assessment & Plan  · 2/2 L AKA  · PT/OT    Multiple myeloma ([facility] [address] )  Assessment & Plan  · Follows with Dr Rajiv Chavarria  · On Velcade and decadron last tx 9/6  · Free light chains ordered outpt f/u with oncology    Diabetes mellitus, type 2 [facility])  Assessment & Plan  Lab Results   Component Value Date    HGBA1C 6 3 (H) 08/05/2022       Recent Labs     09/27/22  1555 09/27/22  2058 09/28/22  1127 09/28/22  1141   POCGLU 170* 138 148* 157*       Blood Sugar Average: Last 72 hrs:  (P) 152 [id number]   · Hold lantus  · SSI    NALLELY (obstructive sleep apnea)  Assessment & Plan  · Refuses cpap    Chronic combined systolic and diastolic congestive heart failure (HCC)  Assessment & Plan  Wt Readings from Last 3 Encounters:   09/28/22 122 kg (269 lb 6 4 oz)   09/06/22 117 kg (257 lb 15 oz)   08/30/22 118 kg (260 lb)     · EF 45% decreased RV fx  · I/O  · Daily weight  · Currently being managed with HD  · Demadex discontinued due to low bp and minimal urine output        Morbid obesity ([facility] [address] )  Assessment & Plan  · Dietary education    Chronic atrial fibrillation ([facility] [address] )  Assessment & Plan  · 9/18 Bradycardia with Junctional escape beats 20's overnight with hypotension  · No rate control meds d/c in august due to bradycardia  · TSH 0 4  · Coumadin on hold due to ? Gi bleed  · PT INR  · Cards consulted recommending CPAP HS and occurs with apnea episodes         * DORA (acute kidney injury) ([facility] [address] )  Assessment & Plan  · DORA on CKD 3  · Most recent d/c creat 3-3 7 prior in 2 range  · CT no hydro, nonobstructing calculi  · UA neg  · Nephrology following  · 9/15 R tunneled HD cath placed  · 9/15 and 9/16 IHD  · Monitor for renal recovery remains oliguric  · S/p IHD on/ 9/19   · Currently on m/w/f schedule with no signs of renal recovery  · Temporary to PermCath conversion 09/21/2022  · No signs of renal recovery  · Continue dialysis as per Nephrology  · Nephrology on board      Labs & Imaging: I have personally reviewed pertinent reports  VTE Prophylaxis: in place      Code Status:   Level 1 - Full Code    Patient Centered Rounds: I have performed bedside rounds with nursing staff today  Discussions with Specialists or Other Care Team Provider:  GI    Education and Discussions with Family / Patient:  Patient will update his family    Current Length of Stay: 12 day(s)    Current Patient Status: Inpatient   Certification Statement: The patient will continue to require additional inpatient hospital stay due to see my assessment and plan  Subjective:   Patient is seen and examined at bedside  Still has upper abdominal pain  Afebrile  All other ROS are negative  Objective:    Vitals: Blood pressure 104/61, pulse 77, temperature 99 2 °F (37 3 °C), resp  rate 20, height 6' 3" (1 905 m), weight 122 kg (269 lb 6 4 oz), SpO2 95 %  ,Body mass index is 33 67 kg/m²  SPO2 RA Rest    Flowsheet Row ED to Hosp-Admission (Current) from 9/12/2022 in [address] Med Surg Unit   SpO2 95 %   SpO2 Activity At Rest   O2 Device None (Room air)   O2 Flow Rate --        I&O:     Intake/Output Summary (Last 24 hours) at 9/28/2022 1146  Last data filed at 9/28/2022 0656  Gross per 24 hour   Intake 120 ml   Output 1400 ml   Net -1280 ml       Physical Exam:    General- Alert, lying comfortably in bed  Not in any acute distress  Neck- Supple, No JVD  CVS- regular, S1 and S2 normal  Chest- Bilateral Air entry, No rhochi, crackles or wheezing present  Abdomen- soft, tender, not distended, no guarding or rigidity, BS+  Extremities-  No pedal edema, No calf tenderness                         Normal ROM in all extremities  CNS-   Alert, awake and orientedx3  No focal deficits present  Invasive Devices  Report    Central Venous Catheter Line  Duration           CVC Central Lines 09/22/22 Double 6 days          Peripheral Intravenous Line  Duration           Peripheral IV 09/27/22 Dorsal (posterior); Right Hand 1 day          Hemodialysis Catheter  Duration           HD Permanent Double Catheter 6 days          Drain  Duration           Rectal Tube With balloon 12 days Social History  reviewed  Family History   Problem Relation Age of Onset    Other Mother         CARDIAC DISORDER     Diabetes Mother     Cancer Father     Other Family         CARDIAC DISORDER     Diabetes Family     Cancer Family     Mental illness Family     Kidney disease Sister     Diabetes Sister     reviewed    Meds:  Current Facility-Administered Medications   Medication Dose Route Frequency Provider Last Rate Last Admin    acetaminophen (TYLENOL) tablet 650 mg  650 mg Oral Q6H PRN Bella Carroll MD        albuterol (PROVENTIL HFA,VENTOLIN HFA) inhaler 2 puff  2 puff Inhalation Q6H PRN Bella Carroll MD        allopurinol (ZYLOPRIM) tablet 300 mg  300 mg Oral Daily Bella Carroll MD   300 mg at 09/28/22 1122    atorvastatin (LIPITOR) tablet 40 mg  40 mg Oral After Fermin Apodaca MD   40 mg at 09/27/22 1704    b complex-vitamin C-folic acid (NEPHROCAPS) capsule 1 capsule  1 capsule Oral Daily With Martha Jacobson MD   1 capsule at 09/27/22 1621    calcium acetate (PHOSLO) capsule 667 mg  667 mg Oral TID With Meals Bella Carroll MD   667 mg at 09/28/22 1116    cholestyramine sugar free (QUESTRAN LIGHT) packet 4 g  4 g Oral BID Bella Carroll MD   4 g at 09/27/22 2015    clotrimazole (LOTRIMIN) 1 % cream   Topical BID Bella Carroll MD   Given at 09/28/22 1117    dicyclomine (BENTYL) capsule 10 mg  10 mg Oral 4x Daily (AC & HS) Bella Carroll MD   10 mg at 09/28/22 1116    diphenoxylate-atropine (LOMOTIL) 2 5-0 025 mg per tablet 1 tablet  1 tablet Oral Q12H NICOLA Guillory        fluticasone-vilanterol (BREO ELLIPTA) 200-25 MCG/INH inhaler 1 puff  1 puff Inhalation Daily Bella Carroll MD   1 puff at 09/28/22 1117    insulin lispro (HumaLOG) 100 units/mL subcutaneous injection 1-6 Units  1-6 Units Subcutaneous TID With Meals Bella Carroll MD   1 Units at 09/27/22 1621    loperamide (IMODIUM) capsule 2 mg  2 mg Oral 4x Daily PRN Bella Carroll MD   2 mg at 09/23/22 1221    melatonin tablet 6 mg  6 mg Oral HS Rober Thapa MD   6 mg at 09/27/22 2139    meropenem (MERREM) 500 mg in sodium chloride 0 9 % 50 mL IVPB  500 mg Intravenous Q24H Rober Thapa  mL/hr at 09/27/22 1506 500 mg at 09/27/22 1506    metoclopramide (REGLAN) injection 10 mg  10 mg Intravenous BID NICOLA Clay   10 mg at 09/28/22 7482    metoclopramide (REGLAN) injection 5 mg  5 mg Intravenous Q12H PRN Rober Thapa MD        midodrine (PROAMATINE) tablet 10 mg  10 mg Oral TID [facility] Rober Thapa MD   10 mg at 09/28/22 1116    ondansetron (ZOFRAN) injection 4 mg  4 mg Intravenous Q6H PRN Rober Thapa MD   4 mg at 09/26/22 1023    pantoprazole (PROTONIX) EC tablet 40 mg  40 mg Oral BID NICOLA Clay   40 mg at 09/28/22 1789    trimethobenzamide (TIGAN) IM injection 200 mg  200 mg Intramuscular Q6H PRN Rober Thapa MD   200 mg at 09/23/22 1004    vancomycin (VANCOCIN) capsule 125 mg  125 mg Oral BID Rober Thapa MD   125 mg at 09/28/22 1122      Medications Prior to Admission   Medication    acetaminophen (TYLENOL) 500 mg tablet    albuterol (PROVENTIL HFA,VENTOLIN HFA) 90 mcg/act inhaler    Alcohol Swabs (ALCOHOL PREP) 70 % PADS    allopurinol (ZYLOPRIM) 300 mg tablet    ammonium lactate (LAC-HYDRIN) 12 % lotion    atorvastatin (LIPITOR) 40 mg tablet    BD AUTOSHIELD DUO 30G X 5 MM MISC    BD INSULIN SYRINGE U/F 31G X 5/16" 0 5 ML MISC    BD PEN NEEDLE HILARIO U/F 32G X 4 MM MISC    bortezomib (VELCADE)    cholestyramine sugar free (QUESTRAN LIGHT) 4 g packet    clotrimazole (LOTRIMIN) 1 % cream    Easy Touch Safety Lancets 28G MISC    fluticasone (FLONASE) 50 mcg/act nasal spray    fluticasone-salmeterol (ADVAIR DISKUS, WIXELA INHUB) 250-50 mcg/dose inhaler    Insulin Glargine Solostar (Lantus SoloStar) 100 UNIT/ML SOPN    liraglutide (Victoza) injection    loperamide (IMODIUM) 2 mg capsule    multivitamin-minerals therapeutic (THERA-M)    NOVOLOG FLEXPEN 100 units/mL SOPN    nystatin (MYCOSTATIN) powder    omeprazole (PriLOSEC) 40 MG capsule    ondansetron (ZOFRAN) 4 mg tablet    potassium chloride (K-DUR,KLOR-CON) 20 mEq tablet    torsemide (DEMADEX) 20 mg tablet       Labs:  Results from last 7 days   Lab Units 09/28/22 0458 09/27/22  0530 09/26/22  0448 09/25/22  2320   WBC Thousand/uL 7 33 7 03 9 95 10 69*   HEMOGLOBIN g/dL 8 2* 8 0* 8 2* 8 3*   HEMATOCRIT % 27 5* 27 0* 26 9* 27 1*   PLATELETS Thousands/uL 318 232 199 201   NEUTROS PCT % 69 72  --  78*   LYMPHS PCT % 16 9*  --  10*   MONOS PCT % 10 13*  --  9   EOS PCT % 3 4  --  1     Results from last 7 days   Lab Units 09/28/22 0458 09/27/22 0530 09/26/22 0448 09/25/22  2320   POTASSIUM mmol/L 3 5 3 3*  --  3 8   CHLORIDE mmol/L 97 97  --  96   CO2 mmol/L 24 28  --  26   BUN mg/dL 38* 26*  --  38*   CREATININE mg/dL 7 81* 5 88*  --  7 69*   CALCIUM mg/dL 8 8 8 8  --  8 6   ALK PHOS U/L 145* 141* 129* 131*   ALT U/L 12 11* 9* 8*   AST U/L 18 21 18 20     Lab Results   Component Value Date    TROPONINI 5 19 (H) 06/27/2020    TROPONINI 5 85 (H) 06/27/2020    TROPONINI 6 35 (H) 06/27/2020    CKMB 4 1 06/25/2020    CKTOTAL 271 06/25/2020    CKTOTAL 53 03/23/2018    CKTOTAL 55 10/08/2017     Results from last 7 days   Lab Units 09/28/22 0458 09/27/22  0530 09/25/22  0751   INR  1 10 1 22* 2 04*     Lab Results   Component Value Date    BLOODCX No Growth at 48 hrs  09/25/2022    BLOODCX No Growth at 48 hrs  09/25/2022    BLOODCX No Growth After 5 Days  09/12/2022    BLOODCX No Growth After 5 Days   09/12/2022    URINECX >100,000 cfu/ml Escherichia coli (A) 12/05/2019    WOUNDCULT 4+ Growth of Proteus mirabilis (A) 07/25/2019    WOUNDCULT 4+ Growth of  07/25/2019    WOUNDCULT (A) 07/25/2019     4+ Growth of Methicillin Resistant Staphylococcus aureus    WOUNDCULT 1+ Growth of Proteus mirabilis (A) 07/25/2019    WOUNDCULT 4+ Growth of  07/25/2019    SPUTUMCULTUR 4+ Growth of  07/11/2020    SPUTUMCULTUR 1+ Growth of Aspergillus fumigatus (A) 07/11/2020 Imaging:  Results for orders placed during the hospital encounter of 09/12/22    XR chest portable    Narrative  CHEST    INDICATION:   fever  COMPARISON:  9/12/2022    EXAM PERFORMED/VIEWS:  XR CHEST PORTABLE      FINDINGS:  There is a right-sided dialysis catheter, placed since the previous study  Heart shadow is enlarged but unchanged from prior exam  There is a large hiatal hernia  There is mild bibasilar atelectasis  Evaluation of the left lung bases otherwise limited by enlarged heart and hernia  No pneumothorax or definite pleural effusion  Osseous structures appear within normal limits for patient age  Impression  Limited evaluation of the left base  Mild bibasilar atelectasis, cardiomegaly, and large hiatal hernia  Workstation performed: [id number]    Results for orders placed during the hospital encounter of 10/04/21    XR chest pa & lateral    Narrative  CHEST    INDICATION:   follow up lung consolidations  COMPARISON:  CTA chest/abdomen/pelvis 10/4/2021  Chest radiograph 7/8/2020  EXAM PERFORMED/VIEWS:  XR CHEST PA & LATERAL      FINDINGS:    Heart shadow is enlarged but unchanged from prior exam     Again seen is dense left lower lobe/retrocardiac consolidation with ill-defined airspace opacities in the lingula and right lower lobe, similar to recent chest CT  Trace left pleural effusion  No pneumothorax  Osseous structures appear within normal limits for patient age  Impression  Stable left lower lobe/retrocardiac consolidation and lingular and right lower lobe ill-defined airspace opacities  Trace left pleural effusion          Workstation performed: [id number]      Last 24 Hours Medication List:   Current Facility-Administered Medications   Medication Dose Route Frequency Provider Last Rate    acetaminophen  650 mg Oral Q6H PRN Deysi Ferreira MD      albuterol  2 puff Inhalation Q6H PRN Deysi Ferreira MD      allopurinol  300 mg Oral Daily Deysi Ferreira MD      atorvastatin  40 mg Oral After Shirin Farrar MD      b complex-vitamin C-folic acid  1 capsule Oral Daily With Shirin Leblanc MD      calcium acetate  667 mg Oral TID With [address] [place] [address], MD      cholestyramine sugar free  4 g Oral BID Shirin Donis MD      clotrimazole   Topical BID Shirin Donis MD      dicyclomine  10 mg Oral 4x Daily (AC & HS) Shirin Donis MD      diphenoxylate-atropine  1 tablet Oral Q12H NICOLA Arciniega      fluticasone-vilanterol  1 puff Inhalation Daily Shirin Donis MD      insulin lispro  1-6 Units Subcutaneous TID With Meals Shirin Donis MD      loperamide  2 mg Oral 4x Daily PRN Shirin Donis MD      melatonin  6 mg Oral HS Shirin Donis MD      meropenem  500 mg Intravenous Q24H Shirin Donis  mg (09/27/22 1506)    metoclopramide  10 mg Intravenous BID NICOLA Mccracken      metoclopramide  5 mg Intravenous Q12H PRN Shirin Donis MD      midodrine  10 mg Oral TID AC Shirin Donis MD      ondansetron  4 mg Intravenous Q6H PRN Shirin Donis MD      pantoprazole  40 mg Oral BID AC NICOLA Arciniega      trimethobenzamide  200 mg Intramuscular Q6H PRN Shirin Donis MD      vancomycin  125 mg Oral BID Shirin Donis MD          Today, Patient Was Seen By: Harley Sanders    ** Please Note: Dictation voice to text software may have been used in the creation of this document   **

## 2022-09-28 NOTE — ASSESSMENT & PLAN NOTE
· Follows with Dr George Ambrose  · On Velcade and decadron last tx 9/6  · Free light chains ordered outpt f/u with oncology

## 2022-09-28 NOTE — PROGRESS NOTES
Progress note - Gastroenterology   Trish Jimenez 81 y o  male MRN: [id number]  Unit/Bed#: -01 Encounter: [id number]    ASSESSMENT and PLAN  1  Diverticulitis -resolved  Admitted with abdominal pain, CT scan consistent with diverticulitis  Treated with Zosyn times 10 days  Repeat CT scan 9/24 showed resolution of sigmoid diverticulitis    2  Diarrhea  Developed diarrhea, stool negative for C diff  Likely multifactorial related to antibiotic use and current chemotherapy for multiple myeloma  Continues to have large amount of liquid stool daily-1200 cc past 24 hours  -continue Questran  -add Lomotil to regimen  -monitor electrolyte     3  Nausea/vomiting  Repeat EGD yesterday 9/26 showed small gastric polyp which was excised, large sliding hiatal hernia without Demian lesions, stomach dilated with large amount of retained liquid, concern for gastroparesis  -will add low-dose Reglan to regimen  -continue pantoprazole 40 mg b i d      4  Acute cholecystitis  Gallbladder distended with wall thickening and alaina cholecystic inflammatory changes, cystic duct calculi  Denies abdominal pain but tender with palpation all quadrants  HIDA scan pending today  Consider cholecystostomy tube if gallbladder not visualized  Surgery following  Continue IV antibiotics for now      Chief Complaint   Patient presents with    Abnormal Lab     Patient arrives via EMS from [facility] for an elevated creatinine of 7 3  Reports yellow emesis for a few days  Patient has RUQ and RLQ abdominal pain, is currently getting chemo once a week for kidney cancer         SUBJECTIVE/HPI   HIDA scan pending  Denies right upper quadrant pain but tender with palpation all quadrants  No nausea or vomiting  Continues with diarrhea-fecal management system in place    /61 (BP Location: Left arm)   Pulse 77   Temp 99 2 °F (37 3 °C)   Resp 20   Ht 6' 3" (1 905 m)   Wt 122 kg (269 lb 6 4 oz)   SpO2 95%   BMI 33 67 kg/m² PHYSICALEXAM  General appearance: alert, appears stated age and cooperative  Eyes: PERLLA, EOMI, no icterus   Head: Normocephalic, without obvious abnormality, atraumatic  Lungs: clear to auscultation bilaterally  Heart: regular rate and rhythm, S1, S2 normal, no murmur, click, rub or gallop  Abdomen: soft, obese, reports tenderness with palpation all quadrants  No nausea or vomiting    Continues to have liquid stool -1400 cc over 24 hours  Extremities: extremities normal, atraumatic, no cyanosis or edema  Neurologic: Grossly normal    Lab Results   Component Value Date    GLUCOSE 64 (L) 08/01/2022    CALCIUM 8 8 09/28/2022     01/15/2018    K 3 5 09/28/2022    CO2 24 09/28/2022    CL 97 09/28/2022    BUN 38 (H) 09/28/2022    CREATININE 7 81 (H) 09/28/2022     Lab Results   Component Value Date    WBC 7 33 09/28/2022    HGB 8 2 (L) 09/28/2022    HCT 27 5 (L) 09/28/2022    MCV 86 09/28/2022     09/28/2022     Lab Results   Component Value Date    ALT 12 09/28/2022    AST 18 09/28/2022     (H) 11/03/2019    ALKPHOS 145 (H) 09/28/2022    BILITOT 0 6 01/15/2018       Lab Results   Component Value Date    LIPASE 691 (H) 09/13/2022     Lab Results   Component Value Date    IRON 17 (L) 09/23/2022    TIBC 174 (L) 09/23/2022    FERRITIN 479 (H) 09/23/2022     Lab Results   Component Value Date    INR 1 10 09/28/2022

## 2022-09-28 NOTE — ASSESSMENT & PLAN NOTE
Wt Readings from Last 3 Encounters:   09/28/22 122 kg (269 lb 6 4 oz)   09/06/22 117 kg (257 lb 15 oz)   08/30/22 118 kg (260 lb)     · EF 45% decreased RV fx  · I/O  · Daily weight  · Currently being managed with HD  · Demadex discontinued due to low bp and minimal urine output

## 2022-09-28 NOTE — ASSESSMENT & PLAN NOTE
Lab Results   Component Value Date    HGBA1C 6 3 (H) 08/05/2022       Recent Labs     09/27/22  1555 09/27/22 2058 09/28/22  1127 09/28/22  1141   POCGLU 170* 138 148* 157*       Blood Sugar Average: Last 72 hrs:  (P) 152 [id number]   · Hold lantus  · SSI

## 2022-09-28 NOTE — NURSING NOTE
Condom cath removed  Tender penis and noted pale yellow drainage  Small sore on top of penis shaft  No urine output as of yet today

## 2022-09-29 ENCOUNTER — APPOINTMENT (INPATIENT)
Dept: INTERVENTIONAL RADIOLOGY/VASCULAR | Facility: HOSPITAL | Age: 72
DRG: 674 | End: 2022-09-29
Attending: RADIOLOGY
Payer: COMMERCIAL

## 2022-09-29 PROBLEM — K81.0 ACUTE CHOLECYSTITIS: Status: ACTIVE | Noted: 2022-09-23

## 2022-09-29 LAB
ANION GAP SERPL CALCULATED.3IONS-SCNC: 10 MMOL/L (ref 4–13)
BASOPHILS # BLD AUTO: 0.07 THOUSANDS/ΜL (ref 0–0.1)
BASOPHILS NFR BLD AUTO: 1 % (ref 0–1)
BUN SERPL-MCNC: 19 MG/DL (ref 5–25)
CALCIUM SERPL-MCNC: 9 MG/DL (ref 8.3–10.1)
CHLORIDE SERPL-SCNC: 99 MMOL/L (ref 96–108)
CO2 SERPL-SCNC: 28 MMOL/L (ref 21–32)
CREAT SERPL-MCNC: 4.79 MG/DL (ref 0.6–1.3)
EOSINOPHIL # BLD AUTO: 0.22 THOUSAND/ΜL (ref 0–0.61)
EOSINOPHIL NFR BLD AUTO: 3 % (ref 0–6)
ERYTHROCYTE [DISTWIDTH] IN BLOOD BY AUTOMATED COUNT: 17.3 % (ref 11.6–15.1)
GFR SERPL CREATININE-BSD FRML MDRD: 11 ML/MIN/1.73SQ M
GLUCOSE SERPL-MCNC: 118 MG/DL (ref 65–140)
GLUCOSE SERPL-MCNC: 120 MG/DL (ref 65–140)
GLUCOSE SERPL-MCNC: 139 MG/DL (ref 65–140)
GLUCOSE SERPL-MCNC: 143 MG/DL (ref 65–140)
GLUCOSE SERPL-MCNC: 169 MG/DL (ref 65–140)
HCT VFR BLD AUTO: 28.7 % (ref 36.5–49.3)
HGB BLD-MCNC: 8.7 G/DL (ref 12–17)
IMM GRANULOCYTES # BLD AUTO: 0.12 THOUSAND/UL (ref 0–0.2)
IMM GRANULOCYTES NFR BLD AUTO: 2 % (ref 0–2)
INR PPP: 1.13 (ref 0.84–1.19)
LYMPHOCYTES # BLD AUTO: 1.11 THOUSANDS/ΜL (ref 0.6–4.47)
LYMPHOCYTES NFR BLD AUTO: 16 % (ref 14–44)
MCH RBC QN AUTO: 26.5 PG (ref 26.8–34.3)
MCHC RBC AUTO-ENTMCNC: 30.3 G/DL (ref 31.4–37.4)
MCV RBC AUTO: 88 FL (ref 82–98)
MONOCYTES # BLD AUTO: 0.76 THOUSAND/ΜL (ref 0.17–1.22)
MONOCYTES NFR BLD AUTO: 11 % (ref 4–12)
NEUTROPHILS # BLD AUTO: 4.85 THOUSANDS/ΜL (ref 1.85–7.62)
NEUTS SEG NFR BLD AUTO: 67 % (ref 43–75)
NRBC BLD AUTO-RTO: 0 /100 WBCS
PLATELET # BLD AUTO: 345 THOUSANDS/UL (ref 149–390)
PMV BLD AUTO: 10.3 FL (ref 8.9–12.7)
POTASSIUM SERPL-SCNC: 3.7 MMOL/L (ref 3.5–5.3)
PROTHROMBIN TIME: 15.3 SECONDS (ref 11.6–14.5)
RBC # BLD AUTO: 3.28 MILLION/UL (ref 3.88–5.62)
SODIUM SERPL-SCNC: 137 MMOL/L (ref 135–147)
WBC # BLD AUTO: 7.13 THOUSAND/UL (ref 4.31–10.16)

## 2022-09-29 PROCEDURE — 82948 REAGENT STRIP/BLOOD GLUCOSE: CPT

## 2022-09-29 PROCEDURE — 99232 SBSQ HOSP IP/OBS MODERATE 35: CPT | Performed by: INTERNAL MEDICINE

## 2022-09-29 PROCEDURE — 85610 PROTHROMBIN TIME: CPT | Performed by: INTERNAL MEDICINE

## 2022-09-29 PROCEDURE — 47490 INCISION OF GALLBLADDER: CPT

## 2022-09-29 PROCEDURE — C1769 GUIDE WIRE: HCPCS

## 2022-09-29 PROCEDURE — 87186 SC STD MICRODIL/AGAR DIL: CPT | Performed by: PHYSICIAN ASSISTANT

## 2022-09-29 PROCEDURE — 80048 BASIC METABOLIC PNL TOTAL CA: CPT | Performed by: INTERNAL MEDICINE

## 2022-09-29 PROCEDURE — 99232 SBSQ HOSP IP/OBS MODERATE 35: CPT | Performed by: PHYSICIAN ASSISTANT

## 2022-09-29 PROCEDURE — 99233 SBSQ HOSP IP/OBS HIGH 50: CPT | Performed by: INTERNAL MEDICINE

## 2022-09-29 PROCEDURE — 47490 INCISION OF GALLBLADDER: CPT | Performed by: INTERNAL MEDICINE

## 2022-09-29 PROCEDURE — 87077 CULTURE AEROBIC IDENTIFY: CPT | Performed by: PHYSICIAN ASSISTANT

## 2022-09-29 PROCEDURE — 87070 CULTURE OTHR SPECIMN AEROBIC: CPT | Performed by: PHYSICIAN ASSISTANT

## 2022-09-29 PROCEDURE — 87205 SMEAR GRAM STAIN: CPT | Performed by: PHYSICIAN ASSISTANT

## 2022-09-29 PROCEDURE — C1729 CATH, DRAINAGE: HCPCS

## 2022-09-29 PROCEDURE — 85025 COMPLETE CBC W/AUTO DIFF WBC: CPT | Performed by: INTERNAL MEDICINE

## 2022-09-29 RX ORDER — SODIUM CHLORIDE 9 MG/ML
10 INJECTION INTRAVENOUS DAILY
Qty: 300 ML | Refills: 3 | Status: SHIPPED | OUTPATIENT
Start: 2022-09-29 | End: 2023-01-27

## 2022-09-29 RX ORDER — LIDOCAINE HYDROCHLORIDE 10 MG/ML
INJECTION, SOLUTION EPIDURAL; INFILTRATION; INTRACAUDAL; PERINEURAL CODE/TRAUMA/SEDATION MEDICATION
Status: COMPLETED | OUTPATIENT
Start: 2022-09-29 | End: 2022-09-29

## 2022-09-29 RX ORDER — PANTOPRAZOLE SODIUM 40 MG/1
40 TABLET, DELAYED RELEASE ORAL
Status: DISCONTINUED | OUTPATIENT
Start: 2022-09-30 | End: 2022-10-04 | Stop reason: HOSPADM

## 2022-09-29 RX ORDER — FENTANYL CITRATE 50 UG/ML
INJECTION, SOLUTION INTRAMUSCULAR; INTRAVENOUS CODE/TRAUMA/SEDATION MEDICATION
Status: COMPLETED | OUTPATIENT
Start: 2022-09-29 | End: 2022-09-29

## 2022-09-29 RX ADMIN — ALLOPURINOL 300 MG: 300 TABLET ORAL at 08:47

## 2022-09-29 RX ADMIN — CLOTRIMAZOLE: 0.01 CREAM TOPICAL at 18:22

## 2022-09-29 RX ADMIN — LIDOCAINE HYDROCHLORIDE 10 ML: 10 INJECTION, SOLUTION EPIDURAL; INFILTRATION; INTRACAUDAL; PERINEURAL at 13:44

## 2022-09-29 RX ADMIN — ATORVASTATIN CALCIUM 40 MG: 40 TABLET, FILM COATED ORAL at 18:24

## 2022-09-29 RX ADMIN — VANCOMYCIN HYDROCHLORIDE 125 MG: 125 CAPSULE ORAL at 18:24

## 2022-09-29 RX ADMIN — DIPHENOXYLATE HYDROCHLORIDE AND ATROPINE SULFATE 1 TABLET: 2.5; .025 TABLET ORAL at 12:04

## 2022-09-29 RX ADMIN — MIDODRINE HYDROCHLORIDE 10 MG: 5 TABLET ORAL at 12:04

## 2022-09-29 RX ADMIN — CLOTRIMAZOLE: 0.01 CREAM TOPICAL at 08:52

## 2022-09-29 RX ADMIN — VANCOMYCIN HYDROCHLORIDE 125 MG: 125 CAPSULE ORAL at 08:46

## 2022-09-29 RX ADMIN — CALCIUM ACETATE 667 MG: 667 CAPSULE ORAL at 17:01

## 2022-09-29 RX ADMIN — MIDODRINE HYDROCHLORIDE 10 MG: 5 TABLET ORAL at 17:01

## 2022-09-29 RX ADMIN — CHOLESTYRAMINE 4 G: 4 POWDER, FOR SUSPENSION ORAL at 22:39

## 2022-09-29 RX ADMIN — FENTANYL CITRATE 100 MCG: 50 INJECTION, SOLUTION INTRAMUSCULAR; INTRAVENOUS at 13:42

## 2022-09-29 RX ADMIN — PANTOPRAZOLE SODIUM 40 MG: 40 TABLET, DELAYED RELEASE ORAL at 08:46

## 2022-09-29 RX ADMIN — DICYCLOMINE HYDROCHLORIDE 10 MG: 10 CAPSULE ORAL at 08:47

## 2022-09-29 RX ADMIN — MIDODRINE HYDROCHLORIDE 10 MG: 5 TABLET ORAL at 08:47

## 2022-09-29 RX ADMIN — INSULIN LISPRO 1 UNITS: 100 INJECTION, SOLUTION INTRAVENOUS; SUBCUTANEOUS at 17:02

## 2022-09-29 RX ADMIN — DIPHENOXYLATE HYDROCHLORIDE AND ATROPINE SULFATE 1 TABLET: 2.5; .025 TABLET ORAL at 22:52

## 2022-09-29 RX ADMIN — NEPHROCAP 1 CAPSULE: 1 CAP ORAL at 17:00

## 2022-09-29 RX ADMIN — FLUTICASONE FUROATE AND VILANTEROL TRIFENATATE 1 PUFF: 200; 25 POWDER RESPIRATORY (INHALATION) at 08:51

## 2022-09-29 RX ADMIN — DICYCLOMINE HYDROCHLORIDE 10 MG: 10 CAPSULE ORAL at 17:01

## 2022-09-29 RX ADMIN — CALCIUM ACETATE 667 MG: 667 CAPSULE ORAL at 08:46

## 2022-09-29 RX ADMIN — Medication 6 MG: at 22:39

## 2022-09-29 RX ADMIN — DICYCLOMINE HYDROCHLORIDE 10 MG: 10 CAPSULE ORAL at 22:39

## 2022-09-29 RX ADMIN — DICYCLOMINE HYDROCHLORIDE 10 MG: 10 CAPSULE ORAL at 12:04

## 2022-09-29 RX ADMIN — IOHEXOL 30 ML: 350 INJECTION, SOLUTION INTRAVENOUS at 13:58

## 2022-09-29 RX ADMIN — MEROPENEM 500 MG: 500 INJECTION, POWDER, FOR SOLUTION INTRAVENOUS at 14:37

## 2022-09-29 NOTE — ASSESSMENT & PLAN NOTE
· 2nd episode in one month (tx with 10 days antibx 8/2-8/11)  · CTAP acute diverticulitis  · GI following  · 7/25 Colonoscopy--2 polyps removed, Mild diverticula in the descending colon and sigmoid colon, Small, internal hemorrhoids  · 9/22 finished 10 day course of zosyn  · Questran started 9/17   · Imodium PRN  · Bentyl added 9/18 by GI  · advance diet per gi recommendations  · Gi suspects  multifactorial probably were related to the antibiotics that he is receiving for the diverticulitis as well as a chemo effect from his myeloma  May need to hold chemo on discharge for a period of time  Treatment at this point is supportive  · Monitor rectal tube output, Stool appears much darker today  · After discontinuation of Zosyn the following day on 09/23 patient started to develop leukocytosis with elevated procalcitonin  · Consulted Infectious Disease  · C diff is negative  · 9/24 Started patient on p o  Vancomycin and Zosyn  · ID switched patient to [address] [place] [address]    Continue Merrem and oral vancomycin

## 2022-09-29 NOTE — SEDATION DOCUMENTATION
Perc choley tube placed  42ml bilious drainage removed and sent to lab  Patient tolerated well and stable for transfer back to room via bed  Report and care assumed by primary RN

## 2022-09-29 NOTE — PROGRESS NOTES
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  -gallbladder remains distended with wall thickening and pericholecystic inflammatory changes, with calculi extending into the cystic duct, again consideration for acute cholecystitis  -right upper quadrant ultrasound with distended gallbladder and wall thickening to 15 mm with intraluminal air gallstones and sludge  -findings consistent with cholecystitis, HIDA positive  -plan for perc vanessa tube today  D/w IR    Leukocytosis  -improved  -antibiotics per ID  -continue to monitor    Diverticulitis/colitis  -resolved on follow-up CT scan    Large hiatal hernia  -known history of hiatal hernia  -CT with distended stomach with ingested contents as well as distension in the 1st 2 portions of the duodenum  -no findings of gastric outlet obstruction on CT scan  -EGD today with findings of large hiatal hernia without Demian lesions and findings of 1 3 mm polyp in the stomach  -no signs of obstruction or bleeding, no indication for immediate surgical intervention  -patient is not a candidate for surgical intervention at this time  -ADAT    DORA ond CKD now on HD, chronic AFIB, Multiple myeloma, CHF, GERD, COPD, NALLELY, DM2, Anemia, morbid obesity  -continue medical, nephrology management       Subjective/Objective   Chief Complaint:  Vomiting    Subjective:  Examined in bed  Complains of ruq pain        Objective:     Blood pressure 103/57, pulse 58, temperature 98 2 °F (36 8 °C), resp  rate 20, height 6' 3" (1 905 m), weight 120 kg (264 lb 1 8 oz), SpO2 95 %  ,Body mass index is 33 01 kg/m²        Intake/Output Summary (Last 24 hours) at 9/29/2022 1130  Last data filed at 9/28/2022 1757  Gross per 24 hour   Intake 740 ml Output 2500 ml   Net -1760 ml       Invasive Devices  Report    Central Venous Catheter Line  Duration           CVC Central Lines 09/22/22 Double 7 days          Peripheral Intravenous Line  Duration           Peripheral IV 09/27/22 Dorsal (posterior); Right Hand 2 days          Hemodialysis Catheter  Duration           HD Permanent Double Catheter 7 days          Drain  Duration           Rectal Tube With balloon 13 days                Physical Exam:   General Appearance: NAD, cooperative, alert  Eyes: Anicteric, conjunctiva clear  HENT:  Normocephalic, atraumatic, normal mucosa  external ears normal, external nose normal, no drainage  Neck:    Supple, symmetrical, trachea midline  Resp:  Clear to auscultation bilaterally; no rales, rhonchi or wheezing; respirations unlabored   CV:  S1 S2, Regular rate and rhythm; no murmur, rub, or gallop  GI:  soft, ttp in RUQ, non distended   Musculoskeletal: No cyanosis, clubbing or edema  Normal ROM  Skin:   No jaundice, rashes, or lesions   Psych: Normal affect, good eye contact  Neuro: No gross deficits, AAOx3, speech nl, looney    Nursing notes and vital signs reviewed      Lab, Imaging and other studies:  I have personally reviewed pertinent lab results    , CBC:   Lab Results   Component Value Date    WBC 7 13 09/29/2022    HGB 8 7 (L) 09/29/2022    HCT 28 7 (L) 09/29/2022    MCV 88 09/29/2022     09/29/2022    MCH 26 5 (L) 09/29/2022    MCHC 30 3 (L) 09/29/2022    RDW 17 3 (H) 09/29/2022    MPV 10 3 09/29/2022    NRBC 0 09/29/2022   , CMP:   Lab Results   Component Value Date    SODIUM 137 09/29/2022    K 3 7 09/29/2022    CL 99 09/29/2022    CO2 28 09/29/2022    BUN 19 09/29/2022    CREATININE 4 79 (H) 09/29/2022    CALCIUM 9 0 09/29/2022    EGFR 11 09/29/2022     VTE Pharmacologic Prophylaxis: Heparin  VTE Mechanical Prophylaxis: sequential compression device     Erika Weinstein

## 2022-09-29 NOTE — ASSESSMENT & PLAN NOTE
Wt Readings from Last 3 Encounters:   09/29/22 120 kg (264 lb 1 8 oz)   09/06/22 117 kg (257 lb 15 oz)   08/30/22 118 kg (260 lb)     · EF 45% decreased RV fx  · I/O  · Daily weight  · Currently being managed with HD  · Demadex discontinued due to low bp and minimal urine output

## 2022-09-29 NOTE — UTILIZATION REVIEW
Continued Stay Review    Date: 9/29/2022                       Current Patient Class: inpatient  Current Level of Care: med/surg    HPI:72 y o  male initially admitted on 9/12     Assessment/Plan:  Denies abdominal pain but has diffuse abdominal pain with palpation  HIDA scan 9/28 shows nonvisualization of GB consistent with acute cholecystitis  Scheduled for cholecystostomy tube today    Continue IV abx per ID  Continue questran bid and lomotil q12 hrs, can increase lomotil to TID if diarrhea continues per GI  Cont po vanco for ppx d/t IV abx  No further n/v, but continues with poor appetite  Júnior Ferris d/c'd last night d/t hallucinations and confusion  Consider gastric emptying study after GB decompressed  INTERVENTIONAL RADIOLOGY PROCEDURE NOTE   Date: 9/29/2022  Findings:    Markedly distended gallbladder containing air, consistent with comparison CT    US / fluoroscopically guideded 8F percutaneous cholecystostomy tube placement  Right anterolateral subcostal transhepatic approach  Cholecystogram demonstrating occlusion of the cystic duct       Tube to remain in place for a minimum of 6 weeks     Flush daily, record output       Further evaluation for cystic duct patency can be considered after the gallbladder has decompressed further      Vital Signs:   Date/Time Temp Pulse Resp BP MAP (mmHg) SpO2 O2 Device Patient Position - Orthostatic VS   09/29/22 07:52:46 98 2 °F (36 8 °C) 58 -- 103/57 72 95 % -- --   09/29/22 06:52:21 98 8 °F (37 1 °C) 60 -- 120/91 101 94 % -- --   09/28/22 21:12:32 98 4 °F (36 9 °C) 73 -- 116/63 81 92 % -- --   09/28/22 1806 97 8 °F (36 6 °C) 87 -- 115/66 82 95 % -- --   09/28/22 1630 -- 67 22 104/57 73 -- -- --   09/28/22 1600 -- 80 22 98/55 69 -- -- --   09/28/22 1420 98 9 °F (37 2 °C) 71 22 122/51 75 -- -- Lying   09/28/22 1048 -- 77 20 104/61 -- 95 % None (Room air) Lying   09/28/22 1027 -- 74 20 95/66 -- 95 % None (Room air) Lying   09/28/22 1009 -- 85 18 111/61 -- 96 % None (Room air) Lying   09/28/22 0932 -- 76 18 114/59 -- 94 % None (Room air) Lying   09/28/22 07:56:17 99 2 °F (37 3 °C) 70 20 118/55 76 92 % -- --   09/28/22 06:25:23 -- 67 -- 91/40 Abnormal  57 Abnormal  91 % -- --   09/27/22 14:05:27 99 7 °F (37 6 °C) 58 -- 112/53 73 91 % -- --   09/27/22 09:47:42 99 3 °F (37 4 °C) 65 -- 112/55 74 92 % -- --   09/27/22 05:58:46 -- 67 -- 84/44 Abnormal  57 Abnormal  91 % -- --         Pertinent Labs/Diagnostic Results:   NM hepatobiliary scan 9/28: 1   Nonvisualization of the gallbladder concerning for acute cholecystitis   Clinical correlation recommended      Results from last 7 days   Lab Units 09/29/22  0652 09/28/22  0458 09/27/22  0530 09/26/22  0448 09/25/22  2320   WBC Thousand/uL 7 13 7 33 7 03 9 95 10 69*   HEMOGLOBIN g/dL 8 7* 8 2* 8 0* 8 2* 8 3*   HEMATOCRIT % 28 7* 27 5* 27 0* 26 9* 27 1*   PLATELETS Thousands/uL 345 318 232 199 201   NEUTROS ABS Thousands/µL 4 85 5 03 5 19  --  8 48*       Results from last 7 days   Lab Units 09/29/22  0652 09/28/22  0458 09/27/22  0530 09/26/22  0448 09/25/22  2320 09/25/22  0509 09/25/22  0446 09/24/22  0446 09/23/22  0407   SODIUM mmol/L 137 136 137  --  135 137  --  134* 136   POTASSIUM mmol/L 3 7 3 5 3 3*  --  3 8 3 7  --  4 3 3 8   CHLORIDE mmol/L 99 97 97  --  96 98  --  98 98   CO2 mmol/L 28 24 28  --  26 28  --  26 27   ANION GAP mmol/L 10 15* 12  --  13 11  --  10 11   BUN mg/dL 19 38* 26*  --  38* 31*  --  21 22   CREATININE mg/dL 4 79* 7 81* 5 88*  --  7 69* 6 68*  --  4 92* 5 84*   EGFR ml/min/1 73sq m 11 6 8  --  6 7  --  11 8   CALCIUM mg/dL 9 0 8 8 8 8  --  8 6 8 8  --  8 8 8 8   MAGNESIUM mg/dL  --   --   --  2 0  --   --  2 2 2 1 1 6   PHOSPHORUS mg/dL  --   --   --   --   --  4 8*  --  3 4 4 4*     Results from last 7 days   Lab Units 09/28/22  0458 09/27/22  0530 09/26/22  0448 09/25/22  2320 09/25/22  0509   AST U/L 18 21 18 20  --    ALT U/L 12 11* 9* 8*  --    ALK PHOS U/L 145* 141* 129* 131*  --    TOTAL PROTEIN g/dL 6 6 6 5 6 2* 6 3*  --    ALBUMIN g/dL 2 2* 2 2* 2 2* 2 2* 2 3*   TOTAL BILIRUBIN mg/dL 0 50 0 60 0 90 0 90  --    BILIRUBIN DIRECT mg/dL  --   --  0 30*  --   --      Results from last 7 days   Lab Units 09/29/22  0727 09/28/22  2109 09/28/22  1702 09/28/22  1141 09/28/22  1127 09/27/22  2058 09/27/22  1555 09/27/22  1120 09/27/22  0800 09/26/22  2117 09/26/22  1635 09/26/22  1200   POC GLUCOSE mg/dl 120 143* 114 157* 148* 138 170* 155* 134 170* 137 118     Results from last 7 days   Lab Units 09/29/22  0652 09/28/22  0458 09/27/22  0530 09/25/22  2320 09/25/22  0509 09/24/22  0446 09/23/22  0407   GLUCOSE RANDOM mg/dL 118 145* 128 134 127 160* 128     Results from last 7 days   Lab Units 09/29/22  0652 09/28/22  0458 09/27/22  0530   PROTIME seconds 15 3* 14 9* 16 2*   INR  1 13 1 10 1 22*       Results from last 7 days   Lab Units 09/26/22  0448 09/25/22  1906 09/25/22  0509 09/24/22  0446   PROCALCITONIN ng/ml 14 18* 9 34* 7 79* 6 39*     Results from last 7 days   Lab Units 09/25/22  2320 09/25/22  1906   LACTIC ACID mmol/L 1 5 2 8*     Results from last 7 days   Lab Units 09/23/22  0407   FERRITIN ng/mL 479*     Results from last 7 days   Lab Units 09/24/22  1121   C DIFF TOXIN B BY PCR  Negative       Results from last 7 days   Lab Units 09/25/22  1921 09/25/22  1913   BLOOD CULTURE  No Growth at 72 hrs  No Growth at 72 hrs         Medications:   Scheduled Medications:  allopurinol, 300 mg, Oral, Daily  atorvastatin, 40 mg, Oral, After Dinner  b complex-vitamin C-folic acid, 1 capsule, Oral, Daily With Dinner  calcium acetate, 667 mg, Oral, TID With Meals  cholestyramine sugar free, 4 g, Oral, BID  clotrimazole, , Topical, BID  dicyclomine, 10 mg, Oral, 4x Daily (AC & HS)  diphenoxylate-atropine, 1 tablet, Oral, Q12H  fluticasone-vilanterol, 1 puff, Inhalation, Daily  insulin lispro, 1-6 Units, Subcutaneous, TID With Meals  melatonin, 6 mg, Oral, HS  meropenem, 500 mg, Intravenous, Q24H (started 9/26)  midodrine, 10 mg, Oral, TID AC  [START ON 9/30/2022] pantoprazole, 40 mg, Oral, Early Morning  vancomycin, 125 mg, Oral, BID    piperacillin-tazobactam (ZOSYN) 2 25 g in sodium chloride 0 9 % 50 mL IVPB  Dose: 2 25 g  Freq: Every 6 hours Route: IV  Last Dose: 2 25 g (09/26/22 0445)  Start: 09/24/22 1030 End: 09/26/22 0612    PRN Meds:  acetaminophen, 650 mg, Oral, Q6H PRN  albuterol, 2 puff, Inhalation, Q6H PRN  ondansetron, 4 mg, Intravenous, Q6H PRN  trimethobenzamide, 200 mg, Intramuscular, Q6H PRN        Discharge Plan: [facility]    Network Utilization Review Department  ATTENTION: Please call with any questions or concerns to [phone] and carefully listen to the prompts so that you are directed to the right person  All voicemails are confidential   Pascual Terry all requests for admission clinical reviews, approved or denied determinations and any other requests to dedicated fax number below belonging to the campus where the patient is receiving treatment   List of dedicated fax numbers for the Facilities:  [address] DENIALS (Administrative/Medical Necessity) [phone]   [address] (Maternity/NICU/Pediatrics) [phone]   [address]  [address] [facility] [place] [address] [address] [place] [address] [address] [facility] [phone]

## 2022-09-29 NOTE — PROGRESS NOTES
Khalif Jay  81 y o   male  1950  mrn [id number]    Assessment/Plan:  1  Sigmoid diverticulitis/Acute cholecystitis/Fever/Leukocytosis/  Diarrhea/DORA on CKD: No further fever and WBC count is 7 3K  He denies abd pain  but conts to have diarrhea  Repeat abd CT was done on 9/25 and showed resolution of diverticulitis but conts to show findings suggestive of cholecystitis  HIDA scan does not visualize the GB c/w acute cholecystitis  Repeat bld cx's are neg  Zosyn was changed to Meropenem on 9/26 when Pt developed fever  He remains on low dose po Vanco for C diff prophylaxis  Creatinine conts to be elevated and he remains on dialysis     Pt presented with weakness, right-sided abd pain, and N/V/D      On admission, he did not have fever WBC count was 10 2K  Creatinine was elevated at 8 4 c/w DORA on CKD  Abd CT showed decreased sigmoid diverticulitis and possible cholecystitis  He was started on Zosyn for tx of diverticulitis  LFT's were nml  C diff and enteric stool panel were neg, Dialysis was started on 9/16  He finished course of Zosyn on 9/22  He developed low grade fever on 9/22  N/V recurred on 9/23  He conts to have diarrhea requiring rectal tube  WBC count increased to 11 6K on 9/24 and procalcitonin was elevated at 6 39  Zosyn was restarted and Pt was placed on oral Vanco  Repeat C diff was neg   No further low grade fever and WBC count has decreased to 8 8K but procalcitonin increased slightly to 7 79      - Cont Meropenem  - HIDA scan shows acute cholecystitis - awaiting further recs from GI regarding placement of cholecystotomy tube  - Cont oral Vanco for C diff prophylaxis since Pt will require further abx tx  - Cont tx of diarrhea as per GI - ?due to abx tx or possibly microscopic colitis  - Cont dialysis as per Renal for DORA on CKD - will adjust abx doses based on creatinine clearance  - Will cont to monitor for the development of toxicity to current abx tx       Subjective: Pt denies abd pain  He is still having some diarrhea  He is aware that HIDA scan shows cholecystitis  No fever and WBC count is nml  No probs with current abx tx    Objective:  VSS, Tmax: 99 7  HEENT: No scleral icterus  NECK:Supple  CARDIAC:  RRR, nml S1, S2  LUNGS:  Clear anteriorly   ABDOMEN:  +BS, soft, nontender  MUSCULOSKELETAL: No right calf tenderness  EXTREMITIES:  +Left AKA  SKIN: No rash      Labs:  CBC w/diff  Recent Labs     09/28/22  0458   WBC 7 33   HGB 8 2*   HCT 27 5*      NEUTOPHILPCT 69   LYMPHOPCT 16   MONOPCT 10   EOSPCT 3     BMP  Recent Labs     09/28/22  0458   K 3 5   CL 97   CO2 24   BUN 38*   CREATININE 7 81*   CALCIUM 8 8     CMP  Recent Labs     09/28/22  0458   K 3 5   CL 97   CO2 24   BUN 38*   CREATININE 7 81*   CALCIUM 8 8   ALKPHOS 145*   ALT 12   AST 18        labrc    Cultures:  Lab Results   Component Value Date    BLOODCX No Growth at 48 hrs  09/25/2022    BLOODCX No Growth at 48 hrs  09/25/2022    BLOODCX No Growth After 5 Days  09/12/2022    BLOODCX No Growth After 5 Days  09/12/2022    BLOODCX No Growth After 5 Days  10/04/2021    BLOODCX No Growth After 5 Days  10/04/2021    BLOODCX No Growth After 5 Days  06/25/2020    BLOODCX No Growth After 5 Days  06/25/2020    BLOODCX Staphylococcus coagulase negative (A) 12/04/2019    BLOODCX No Growth After 5 Days  12/04/2019    BLOODCX No Growth After 5 Days  10/29/2019    BLOODCX No Growth After 5 Days  10/29/2019    BLOODCX No Growth After 5 Days  07/25/2019    BLOODCX No Growth After 5 Days  07/25/2019    BLOODCX No Growth After 5 Days  12/25/2018    BLOODCX No Growth After 5 Days  12/25/2018    BLOODCX No Growth After 5 Days  03/23/2018    BLOODCX No Growth After 5 Days  03/23/2018    BLOODCX No Growth After 5 Days  10/08/2017    BLOODCX No Growth After 5 Days  10/08/2017    BLOODCX No Growth After 5 Days  09/15/2016    BLOODCX No Growth After 5 Days   09/15/2016     Lab Results   Component Value Date    WOUNDCULT 4+ Growth of Proteus mirabilis (A) 07/25/2019    WOUNDCULT 4+ Growth of  07/25/2019    WOUNDCULT (A) 07/25/2019     4+ Growth of Methicillin Resistant Staphylococcus aureus    WOUNDCULT 1+ Growth of Proteus mirabilis (A) 07/25/2019    WOUNDCULT 4+ Growth of  07/25/2019    WOUNDCULT 3+ Growth of Staphylococcus aureus (A) 12/25/2018    WOUNDCULT 2+ Growth of  12/25/2018    WOUNDCULT 4+ Growth of Staphylococcus aureus 09/15/2016    WOUNDCULT 4+ Growth of Proteus mirabilis 09/15/2016    WOUNDCULT 4+ Growth of Mixed Skin Nini 09/15/2016     Lab Results   Component Value Date    URINECX >100,000 cfu/ml Escherichia coli (A) 12/05/2019     Lab Results   Component Value Date    SPUTUMCULTUR 4+ Growth of  07/11/2020    SPUTUMCULTUR 1+ Growth of Aspergillus fumigatus (A) 07/11/2020       MED:  Meropenem: #4  Oral Vanco; #5        Completed:  Zosyn x 11 days on 9/22  Zosyn x 2 days on 9/25        Current Facility-Administered Medications:     acetaminophen (TYLENOL) tablet 650 mg, 650 mg, Oral, Q6H PRN, Micaela Mahajan MD    albuterol (PROVENTIL HFA,VENTOLIN HFA) inhaler 2 puff, 2 puff, Inhalation, Q6H PRN, Micaela Mahajan MD    allopurinol (ZYLOPRIM) tablet 300 mg, 300 mg, Oral, Daily, Micaela Mahajan MD, 300 mg at 09/28/22 1122    atorvastatin (LIPITOR) tablet 40 mg, 40 mg, Oral, After Lali Gomez MD, 40 mg at 09/28/22 1750    b complex-vitamin C-folic acid (NEPHROCAPS) capsule 1 capsule, 1 capsule, Oral, Daily With Carol Crespo MD, 1 capsule at 09/28/22 1651    calcium acetate (PHOSLO) capsule 667 mg, 667 mg, Oral, TID With Meals, Micaela Mahajan MD, 667 mg at 09/28/22 1651    cholestyramine sugar free (QUESTRAN LIGHT) packet 4 g, 4 g, Oral, BID, Micaela Mahajan MD, 4 g at 09/28/22 2043    clotrimazole (LOTRIMIN) 1 % cream, , Topical, BID, Micaela Mahajan MD, Given at 09/28/22 1750    dicyclomine (BENTYL) capsule 10 mg, 10 mg, Oral, 4x Daily (AC & HS), Micaela Mahajan MD, 10 mg at 09/28/22 9739    diphenoxylate-atropine (LOMOTIL) 2 5-0 025 mg per tablet 1 tablet, 1 tablet, Oral, Q12H, NICOLA Schmitz, 1 tablet at 09/28/22 2257    fluticasone-vilanterol (BREO ELLIPTA) 200-25 MCG/INH inhaler 1 puff, 1 puff, Inhalation, Daily, Yvon Gomez MD, 1 puff at 09/28/22 1117    insulin lispro (HumaLOG) 100 units/mL subcutaneous injection 1-6 Units, 1-6 Units, Subcutaneous, TID With Meals, 1 Units at 09/27/22 1621 **AND** Fingerstick Glucose (POCT), , , 4x Daily AC and at bedtime, Yvon Gomez MD    loperamide (IMODIUM) capsule 2 mg, 2 mg, Oral, 4x Daily PRN, Yvon Gomez MD, 2 mg at 09/23/22 1221    melatonin tablet 6 mg, 6 mg, Oral, HS, Yvon Gomez MD, 6 mg at 09/28/22 2257    meropenem (MERREM) 500 mg in sodium chloride 0 9 % 50 mL IVPB, 500 mg, Intravenous, Q24H, Yvon Gomez MD, Last Rate: 100 mL/hr at 09/28/22 1650, 500 mg at 09/28/22 1650    metoclopramide (REGLAN) injection 10 mg, 10 mg, Intravenous, BID AC, NICOLA Schmitz, 10 mg at 09/28/22 1652    metoclopramide (REGLAN) injection 5 mg, 5 mg, Intravenous, Q12H PRN, Yvon Gomez MD    midodrine (PROAMATINE) tablet 10 mg, 10 mg, Oral, TID AC, Yvon Gomez MD, 10 mg at 09/28/22 1547    ondansetron (ZOFRAN) injection 4 mg, 4 mg, Intravenous, Q6H PRN, Yvon Gomez MD, 4 mg at 09/26/22 1023    pantoprazole (PROTONIX) EC tablet 40 mg, 40 mg, Oral, BID AC, NICOLA Schmitz, 40 mg at 09/28/22 1651    trimethobenzamide (TIGAN) IM injection 200 mg, 200 mg, Intramuscular, Q6H PRN, Yvon Gomez MD, 200 mg at 09/23/22 1004    vancomycin (VANCOCIN) capsule 125 mg, 125 mg, Oral, BID, Yvon Gomez MD, 125 mg at 09/28/22 1750    Principal Problem:    DORA (acute kidney injury) ([facility] [address] )  Active Problems:    Chronic atrial fibrillation (HCC)    Morbid obesity (HCC)    Chronic combined systolic and diastolic congestive heart failure (HCC)    NALLELY (obstructive sleep apnea)    Diabetes mellitus, type 2 (HCC)    Multiple myeloma (HCC)    Ambulatory dysfunction    Anemia    Acute diverticulitis    Abnormal CT scan    Nausea and vomiting      Ai Giraldo MD

## 2022-09-29 NOTE — ASSESSMENT & PLAN NOTE
· CTAP-common bile duct stent with pneumobilia and air within gallblader  Distended gallbladder with cholelithiasis  · RUQ u/s distended gallbladder with air within stent in CBD similar to CT  · Surgery consulted  If concern for acute cholecystitis not surgical candidate would need per vanessa tube  · GI following  · ERCP 8/29/2022, Dr Steffany Castillo, Madeleine Cuevas esophagus seen during EGD, biopsy obtained   ERCP performed with endoscopic mucosal resection of abnormal looking ampullary tissue, PD, CBD stent placed    · No s/s acute cholecystitis monitor   · Surgical and GI follow-up noted

## 2022-09-29 NOTE — BRIEF OP NOTE (RAD/CATH)
INTERVENTIONAL RADIOLOGY PROCEDURE NOTE    Date: 9/29/2022    Procedure: Procedure name not found  Preoperative diagnosis:   1  ARF (acute renal failure) ([facility] [address] )    2  Hyperkalemia    3  Atrial fibrillation ([facility] [address] )    4  Pneumobilia    5  Cholelithiasis    6  Nausea vomiting and diarrhea    7  Abdominal pain    8  Diverticulitis    9  Acute diverticulitis    10  Anemia, unspecified type    11  Multiple myeloma not having achieved remission ([facility] [address] )    12  DORA (acute kidney injury) ([facility] [address] )    13  Leukocytosis    14  Nausea and vomiting, unspecified vomiting type    15  Cholecystitis, chronic         Postoperative diagnosis: Same  Surgeon: Lisa Goode     Assistant: None  No qualified resident was available  Blood loss: None    Specimens: 10cc clear greenish aspirate     Findings:     Markedly distended gallbladder containing air, consistent with comparison CT    US / fluoroscopically guideded 8F percutaneous cholecystostomy tube placement  Right anterolateral subcostal transhepatic approach  Cholecystogram demonstrating occlusion of the cystic duct  Tube to remain in place for a minimum of 6 weeks  Flush daily, record output  Further evaluation for cystic duct patency can be considered after the gallbladder has decompressed further  Complications: None immediate      Anesthesia: conscious sedation

## 2022-09-29 NOTE — QUICK NOTE
Called to see patient given he was agitated and trying to leave  Patient seen in room with lights dimmed, PCA in room  He reports it is July 2002 and he is at [place] [address] and he wants to leave to go sleep at home  Speech clear and without dysarthria  Following commands  Patient appears delirious - worsening throughout the night  May be related to recent use of IV reglan  Stop reglan  Dim nights, limit noise/interruptions  PCA to remain in room at this time  May require 1:1 for safety  Would avoid benzos at this time      Ronak Fofana PA-C

## 2022-09-29 NOTE — PROGRESS NOTES
Progress note - Gastroenterology   Umesh Blind 81 y o  male MRN: [id number]  Unit/Bed#: -01 Encounter: [id number]    ASSESSMENT and PLAN  1  Diverticulitis -resolved  Admitted with abdominal pain, CT scan consistent with diverticulitis  Treated with Zosyn times 10 days     Repeat CT scan 9/24 showed resolution of sigmoid diverticulitis     2  Acute cholecystitis  Gallbladder distended with wall thickening and alaina cholecystic inflammatory changes, cystic duct calculi on CT scan  Denies abdominal pain but tender with palpation all quadrants  HIDA scan 9/28 shows nonvisualization of gallbladder consistent with acute cholecystitis  -scheduled for cholecystostomy tube today  -Surgery following and Infectious Disease following  -Continue IV antibiotics per ID     2  Diarrhea  Developed diarrhea, stool negative for C diff  Likely multifactorial related to antibiotic use and current chemotherapy for multiple myeloma  Continues to have large amount of liquid stool   -continue Questran b i d   -continue Lomotil q 12 hours-can increase to t i d  If diarrhea continued  -on vancomycin prophylaxis due to continued antibiotics     3  Nausea/vomiting  Repeat EGD  9/26 showed small gastric polyp which was excised, large sliding hiatal hernia without Demian lesions, stomach dilated with large amount of retained liquid, concern for gastroparesis  No further nausea or vomiting but his appetite has been poor, and p o  At present for cholecystostomy tube  -Reglan discontinued last night due to hallucinations and confusion  -continue pantoprazole 40 mg b i d   -consider gastric emptying study after gallbladder decompressed      Chief Complaint   Patient presents with    Abnormal Lab     Patient [facility] EMS from [facility] for an elevated creatinine of 7 3  Reports yellow emesis for a few days  Patient has RUQ and RLQ abdominal pain, is currently getting chemo once a week for kidney cancer         SUBJECTIVE/HPI HIDA scan 9/28 with nonvisualization of gallbladder concerning for acute cholecystitis  Scheduled for IR cholecystostomy tube placement today  He denies abdominal pain but has diffuse abdominal pain with palpation  Continues to have liquid brown stool-not measured over past 24 hours    /57   Pulse 58   Temp 98 2 °F (36 8 °C)   Resp 20   Ht 6' 3" (1 905 m)   Wt 120 kg (264 lb 1 8 oz)   SpO2 95%   BMI 33 01 kg/m²     PHYSICALEXAM  General appearance:  NAD  Eyes: no icterus   Head: Normocephalic, without obvious abnormality, atraumatic  Lungs: clear to auscultation bilaterally  Heart: regular rate and rhythm, S1, S2 normal, no murmur, click, rub or gallop  Abdomen: soft, obese, tender with palpation all quadrants  Denies nausea or vomiting    Continues with liquid brown stool  Extremities: extremities normal, atraumatic, no cyanosis or edema  Neurologic: Grossly normal    Lab Results   Component Value Date    GLUCOSE 64 (L) 08/01/2022    CALCIUM 9 0 09/29/2022     01/15/2018    K 3 7 09/29/2022    CO2 28 09/29/2022    CL 99 09/29/2022    BUN 19 09/29/2022    CREATININE 4 79 (H) 09/29/2022     Lab Results   Component Value Date    WBC 7 13 09/29/2022    HGB 8 7 (L) 09/29/2022    HCT 28 7 (L) 09/29/2022    MCV 88 09/29/2022     09/29/2022     Lab Results   Component Value Date    ALT 12 09/28/2022    AST 18 09/28/2022     (H) 11/03/2019    ALKPHOS 145 (H) 09/28/2022    BILITOT 0 6 01/15/2018       Lab Results   Component Value Date    LIPASE 691 (H) 09/13/2022     Lab Results   Component Value Date    IRON 17 (L) 09/23/2022    TIBC 174 (L) 09/23/2022    FERRITIN 479 (H) 09/23/2022     Lab Results   Component Value Date    INR 1 13 09/29/2022

## 2022-09-29 NOTE — PROGRESS NOTES
[place]  Progress Note - Kanwal Cleaning 1950, 81 y o  male MRN: [id number]  Unit/Bed#: -Dariusz Encounter: [id number]  Primary Care Provider: Mariam Arndt MD   Date and time admitted to hospital: 9/12/2022  4:09 PM    Acute cholecystitis  Assessment & Plan  9/23 this morning patient started to develop some nausea and vomiting, vomiting noted to be dark in color, no dark stools noted in rectal tube  Gi started patient on IV PPI b i d  Clear liquid diet  Most likely secondary to large hiatal hernia on previous EGD  9/24 rectal tube noted to have dark stools  N/V resolved, continues to have abdominal tenderness  Hg this morning is 8 7  CT abd/pelvis- Distended gallbladder with cholelithiasis, wall thickening, and pericholecystic inflammatory change  There appear to be calculi extending into the cystic duct  Findings can be due to acute cholecystitis in the appropriate clinical setting  Consider gallbladder ultrasound  2  Previously seen inflammatory changes related to acute sigmoid diverticulitis are no longer visualized "  RUQ US- " Again seen is a distended gallbladder with gallbladder wall thickening up to 15 mm, also containing intraluminal air, gallstones, sludge  Sonographic Lentz's sign is negative "  Surgical follow-up noted  As per surgery patient is not a candidate for cholecystectomy at this time  They recommended cholecystomy tube placement  · HIDA scan showed-Nonvisualization of the gallbladder concerning for acute cholecystitis  · Continue IV antibiotics  · Patient is scheduled for cholecystomy tube placement by IR today  Abnormal CT scan  Assessment & Plan  · CTAP-common bile duct stent with pneumobilia and air within gallblader  Distended gallbladder with cholelithiasis  · RUQ u/s distended gallbladder with air within stent in CBD similar to CT  · Surgery consulted   If concern for acute cholecystitis not surgical candidate would need per vanessa tube  · GI following  · ERCP 8/29/2022, Dr Flavio Tomas, Avelina Bald esophagus seen during EGD, biopsy obtained   ERCP performed with endoscopic mucosal resection of abnormal looking ampullary tissue, PD, CBD stent placed  · No s/s acute cholecystitis monitor   · Surgical and GI follow-up noted    Acute diverticulitis  Assessment & Plan  · 2nd episode in one month (tx with 10 days antibx 8/2-8/11)  · CTAP acute diverticulitis  · GI following  · 7/25 Colonoscopy--2 polyps removed, Mild diverticula in the descending colon and sigmoid colon, Small, internal hemorrhoids  · 9/22 finished 10 day course of zosyn  · Questran started 9/17   · Imodium PRN  · Bentyl added 9/18 by GI  · advance diet per gi recommendations  · Gi suspects  multifactorial probably were related to the antibiotics that he is receiving for the diverticulitis as well as a chemo effect from his myeloma  May need to hold chemo on discharge for a period of time  Treatment at this point is supportive  · Monitor rectal tube output, Stool appears much darker today  · After discontinuation of Zosyn the following day on 09/23 patient started to develop leukocytosis with elevated procalcitonin  · Consulted Infectious Disease  · C diff is negative  · 9/24 Started patient on p o  Vancomycin and Zosyn  · ID switched patient to [address] [place] [address]  Continue Merrem and oral vancomycin    Anemia  Assessment & Plan  · Baseline hgb 8  · Received IV venofer  · Trend H&H  · Transfuse for hgb <7  · Patient underwent EGD on 09/26 which showed One 3 mm polyp in the stomach removed with biopsy forceps  Large sliding hiatal hernia without Demian lesions     Biliary stents in place draining  Dilation of the stomach with large amount of retained   liquid upon entrance to the stomach   Suctioned out  Cherylyn Samples for element of   Gastroparesis  · Started on low-dose Reglan and continue Protonix      Ambulatory dysfunction  Assessment & Plan  · 2/2 L AKA  · PT/OT    Multiple myeloma ([address] )  Assessment & Plan  · Follows with Dr Lewis Daniels  · On Velcade and decadron last tx 9/6  · Free light chains ordered outpt f/u with oncology    Diabetes mellitus, type 2 [facility])  Assessment & Plan  Lab Results   Component Value Date    HGBA1C 6 3 (H) 08/05/2022       Recent Labs     09/28/22  1141 09/28/22  1702 09/28/22  2109 09/29/22  0727   POCGLU 157* 114 143* 120       Blood Sugar Average: Last 72 hrs:  (P) 145 [id number]   · Hold lantus  · SSI    NALLELY (obstructive sleep apnea)  Assessment & Plan  · Refuses cpap    Chronic combined systolic and diastolic congestive heart failure (HCC)  Assessment & Plan  Wt Readings from Last 3 Encounters:   09/29/22 120 kg (264 lb 1 8 oz)   09/06/22 117 kg (257 lb 15 oz)   08/30/22 118 kg (260 lb)     · EF 45% decreased RV fx  · I/O  · Daily weight  · Currently being managed with HD  · Demadex discontinued due to low bp and minimal urine output          Morbid obesity ([facility] [address] )  Assessment & Plan  · Dietary education    Chronic atrial fibrillation [facility])  Assessment & Plan  · 9/18 Bradycardia with Junctional escape beats 20's overnight with hypotension  · No rate control meds d/c in august due to bradycardia  · TSH 0 4  · Coumadin on hold  · PT INR  · Cards consulted recommending CPAP HS and occurs with apnea episodes   · Patient is scheduled for cholecystomy tube placement today  · Will restart Coumadin after the procedure        * DORA (acute kidney injury) ([facility] [address] )  Assessment & Plan  · DORA on CKD 3  · Most recent d/c creat 3-3 7 prior in 2 range  · CT no hydro, nonobstructing calculi  · UA neg  · Nephrology following  · 9/15 R tunneled HD cath placed  · 9/15 and 9/16 IHD  · Monitor for renal recovery remains oliguric  · S/p IHD on/ 9/19   · Currently on m/w/f schedule with no signs of renal recovery  · Temporary to PermCath conversion 09/21/2022  · No signs of renal recovery  · Continue dialysis as per Nephrology  · Nephrology on board        Labs & Imaging: I have personally reviewed pertinent reports  VTE Prophylaxis: in place  Code Status:   Level 1 - Full Code    Patient Centered Rounds: I have performed bedside rounds with nursing staff today  Discussions with Specialists or Other Care Team Provider:  Surgery    Education and Discussions with Family / Patient:  Patient denied family update    Current Length of Stay: 17 day(s)    Current Patient Status: Inpatient   Certification Statement: The patient will continue to require additional inpatient hospital stay due to see my assessment and plan  Subjective:   Patient is seen and examined at bedside  Has abdominal pain  Denies any other complaint  Afebrile  All other ROS are negative  Objective:    Vitals: Blood pressure 103/57, pulse 58, temperature 98 2 °F (36 8 °C), resp  rate 20, height 6' 3" (1 905 m), weight 120 kg (264 lb 1 8 oz), SpO2 95 %  ,Body mass index is 33 01 kg/m²  SPO2 RA Rest    Flowsheet Row ED to Hosp-Admission (Current) from 9/12/2022 in [address] Med Surg Unit   SpO2 95 %   SpO2 Activity At Rest   O2 Device None (Room air)   O2 Flow Rate --        I&O:     Intake/Output Summary (Last 24 hours) at 9/29/2022 0819  Last data filed at 9/28/2022 1757  Gross per 24 hour   Intake 740 ml   Output 2500 ml   Net -1760 ml       Physical Exam:    General- Alert, lying comfortably in bed  Not in any acute distress  Neck- Supple, No JVD  CVS- regular, S1 and S2 normal  Chest- Bilateral Air entry, No rhochi, crackles or wheezing present  Abdomen- soft, tender, not distended, no guarding or rigidity, BS+  Extremities-  No pedal edema, No calf tenderness  CNS-   Alert, awake and orientedx3  No focal deficits present  Invasive Devices  Report    Central Venous Catheter Line  Duration           CVC Central Lines 09/22/22 Double 6 days          Peripheral Intravenous Line  Duration           Peripheral IV 09/27/22 Dorsal (posterior); Right Hand 1 day          Hemodialysis Catheter  Duration           HD Permanent Double Catheter 6 days          Drain  Duration           Rectal Tube With balloon 13 days                      Social History  reviewed  Family History   Problem Relation Age of Onset    Other Mother         CARDIAC DISORDER     Diabetes Mother     Cancer Father     Other Family         CARDIAC DISORDER     Diabetes Family     Cancer Family     Mental illness Family     Kidney disease Sister     Diabetes Sister     reviewed    Meds:  Current Facility-Administered Medications   Medication Dose Route Frequency Provider Last Rate Last Admin    acetaminophen (TYLENOL) tablet 650 mg  650 mg Oral Q6H PRN Deysi Ferreira MD        albuterol (PROVENTIL HFA,VENTOLIN HFA) inhaler 2 puff  2 puff Inhalation Q6H PRN Deysi Ferreira MD        allopurinol (ZYLOPRIM) tablet 300 mg  300 mg Oral Daily Deysi Ferreira MD   300 mg at 09/28/22 1122    atorvastatin (LIPITOR) tablet 40 mg  40 mg Oral After Niki Dickson MD   40 mg at 09/28/22 1750    b complex-vitamin C-folic acid (NEPHROCAPS) capsule 1 capsule  1 capsule Oral Daily With Blossom Zamudio MD   1 capsule at 09/28/22 1651    calcium acetate (PHOSLO) capsule 667 mg  667 mg Oral TID With Meals Deysi Ferreira MD   667 mg at 09/28/22 1651    cholestyramine sugar free (QUESTRAN LIGHT) packet 4 g  4 g Oral BID Deysi Ferreira MD   4 g at 09/28/22 2043    clotrimazole (LOTRIMIN) 1 % cream   Topical BID Deysi Ferreira MD   Given at 09/28/22 1750    dicyclomine (BENTYL) capsule 10 mg  10 mg Oral 4x Daily (AC & HS) Deysi Ferreira MD   10 mg at 09/28/22 2257    diphenoxylate-atropine (LOMOTIL) 2 5-0 025 mg per tablet 1 tablet  1 tablet Oral Q12H Elvera Lab, CRNP   1 tablet at 09/28/22 2257    fluticasone-vilanterol (BREO ELLIPTA) 200-25 MCG/INH inhaler 1 puff  1 puff Inhalation Daily Deysi Ferreira MD   1 puff at 09/28/22 1117    insulin lispro (HumaLOG) 100 units/mL subcutaneous injection 1-6 Units  1-6 Units Subcutaneous TID With Meals Deysi Ferreira MD   1 Units at 09/27/22 1621    loperamide (IMODIUM) capsule 2 mg  2 mg Oral 4x Daily PRN Bella Carroll MD   2 mg at 09/23/22 1221    melatonin tablet 6 mg  6 mg Oral HS Bella Carroll MD   6 mg at 09/28/22 2257    meropenem (MERREM) 500 mg in sodium chloride 0 9 % 50 mL IVPB  500 mg Intravenous Q24H Bella Carroll  mL/hr at 09/28/22 1650 500 mg at 09/28/22 1650    metoclopramide (REGLAN) injection 5 mg  5 mg Intravenous Q12H PRN Bella Carroll MD        midodrine (PROAMATINE) tablet 10 mg  10 mg Oral TID [facility] Bella Carroll MD   10 mg at 09/28/22 1547    ondansetron (ZOFRAN) injection 4 mg  4 mg Intravenous Q6H PRN Bella Carroll MD   4 mg at 09/26/22 1023    pantoprazole (PROTONIX) EC tablet 40 mg  40 mg Oral BID AC NICOLA Guillory   40 mg at 09/28/22 1651    trimethobenzamide (TIGAN) IM injection 200 mg  200 mg Intramuscular Q6H PRN Bella Carroll MD   200 mg at 09/23/22 1004    vancomycin (VANCOCIN) capsule 125 mg  125 mg Oral BID Bella Carroll MD   125 mg at 09/28/22 1750      Medications Prior to Admission   Medication    acetaminophen (TYLENOL) 500 mg tablet    albuterol (PROVENTIL HFA,VENTOLIN HFA) 90 mcg/act inhaler    Alcohol Swabs (ALCOHOL PREP) 70 % PADS    allopurinol (ZYLOPRIM) 300 mg tablet    ammonium lactate (LAC-HYDRIN) 12 % lotion    atorvastatin (LIPITOR) 40 mg tablet    BD AUTOSHIELD DUO 30G X 5 MM MISC    BD INSULIN SYRINGE U/F 31G X 5/16" 0 5 ML MISC    BD PEN NEEDLE HILARIO U/F 32G X 4 MM MISC    bortezomib (VELCADE)    cholestyramine sugar free (QUESTRAN LIGHT) 4 g packet    clotrimazole (LOTRIMIN) 1 % cream    Easy Touch Safety Lancets 28G MISC    fluticasone (FLONASE) 50 mcg/act nasal spray    fluticasone-salmeterol (ADVAIR DISKUS, WIXELA INHUB) 250-50 mcg/dose inhaler    Insulin Glargine Solostar (Lantus SoloStar) 100 UNIT/ML SOPN    liraglutide (Victoza) injection    loperamide (IMODIUM) 2 mg capsule    multivitamin-minerals therapeutic (THERA-M)    NOVOLOG FLEXPEN 100 units/mL SOPN    nystatin (MYCOSTATIN) powder    omeprazole (PriLOSEC) 40 MG capsule    ondansetron (ZOFRAN) 4 mg tablet    potassium chloride (K-DUR,KLOR-CON) 20 mEq tablet    torsemide (DEMADEX) 20 mg tablet       Labs:  Results from last 7 days   Lab Units 09/29/22  0652 09/28/22  0458 09/27/22  0530   WBC Thousand/uL 7 13 7 33 7 03   HEMOGLOBIN g/dL 8 7* 8 2* 8 0*   HEMATOCRIT % 28 7* 27 5* 27 0*   PLATELETS Thousands/uL 345 318 232   NEUTROS PCT % 67 69 72   LYMPHS PCT % 16 16 9*   MONOS PCT % 11 10 13*   EOS PCT % 3 3 4     Results from last 7 days   Lab Units 09/29/22  0652 09/28/22  0458 09/27/22  0530 09/26/22  0448   POTASSIUM mmol/L 3 7 3 5 3 3*  --    CHLORIDE mmol/L 99 97 97  --    CO2 mmol/L 28 24 28  --    BUN mg/dL 19 38* 26*  --    CREATININE mg/dL 4 79* 7 81* 5 88*  --    CALCIUM mg/dL 9 0 8 8 8 8  --    ALK PHOS U/L  --  145* 141* 129*   ALT U/L  --  12 11* 9*   AST U/L  --  18 21 18     Lab Results   Component Value Date    TROPONINI 5 19 (H) 06/27/2020    TROPONINI 5 85 (H) 06/27/2020    TROPONINI 6 35 (H) 06/27/2020    CKMB 4 1 06/25/2020    CKTOTAL 271 06/25/2020    CKTOTAL 53 03/23/2018    CKTOTAL 55 10/08/2017     Results from last 7 days   Lab Units 09/29/22  0652 09/28/22 0458 09/27/22  0530   INR  1 13 1 10 1 22*     Lab Results   Component Value Date    BLOODCX No Growth at 72 hrs  09/25/2022    BLOODCX No Growth at 72 hrs  09/25/2022    BLOODCX No Growth After 5 Days  09/12/2022    BLOODCX No Growth After 5 Days   09/12/2022    URINECX >100,000 cfu/ml Escherichia coli (A) 12/05/2019    WOUNDCULT 4+ Growth of Proteus mirabilis (A) 07/25/2019    WOUNDCULT 4+ Growth of  07/25/2019    WOUNDCULT (A) 07/25/2019     4+ Growth of Methicillin Resistant Staphylococcus aureus    WOUNDCULT 1+ Growth of Proteus mirabilis (A) 07/25/2019    WOUNDCULT 4+ Growth of  07/25/2019    SPUTUMCULTUR 4+ Growth of  07/11/2020    SPUTUMCULTUR 1+ Growth of Aspergillus fumigatus (A) 07/11/2020         Imaging:  Results for orders placed during the hospital encounter of 09/12/22    XR chest portable    Narrative  CHEST    INDICATION:   fever  COMPARISON:  9/12/2022    EXAM PERFORMED/VIEWS:  XR CHEST PORTABLE      FINDINGS:  There is a right-sided dialysis catheter, placed since the previous study  Heart shadow is enlarged but unchanged from prior exam  There is a large hiatal hernia  There is mild bibasilar atelectasis  Evaluation of the left lung bases otherwise limited by enlarged heart and hernia  No pneumothorax or definite pleural effusion  Osseous structures appear within normal limits for patient age  Impression  Limited evaluation of the left base  Mild bibasilar atelectasis, cardiomegaly, and large hiatal hernia  Workstation performed: [id number]    Results for orders placed during the hospital encounter of 10/04/21    XR chest pa & lateral    Narrative  CHEST    INDICATION:   follow up lung consolidations  COMPARISON:  CTA chest/abdomen/pelvis 10/4/2021  Chest radiograph 7/8/2020  EXAM PERFORMED/VIEWS:  XR CHEST PA & LATERAL      FINDINGS:    Heart shadow is enlarged but unchanged from prior exam     Again seen is dense left lower lobe/retrocardiac consolidation with ill-defined airspace opacities in the lingula and right lower lobe, similar to recent chest CT  Trace left pleural effusion  No pneumothorax  Osseous structures appear within normal limits for patient age  Impression  Stable left lower lobe/retrocardiac consolidation and lingular and right lower lobe ill-defined airspace opacities  Trace left pleural effusion          Workstation performed: [id number]      Last 24 Hours Medication List:   Current Facility-Administered Medications   Medication Dose Route Frequency Provider Last Rate    acetaminophen  650 mg Oral Q6H PRN Lynne Phillips MD      albuterol  2 puff Inhalation Q6H PRN Lynne Phillips MD      allopurinol  300 mg Oral Daily Lynne Phillips MD      atorvastatin  40 mg Oral After Miryam Peña MD      b complex-vitamin C-folic acid  1 capsule Oral Daily With Tom Ma MD      calcium acetate  667 mg Oral TID With [facility]      cholestyramine sugar free  4 g Oral BID Gricel Lepe MD      clotrimazole   Topical BID Gricel Lepe MD      dicyclomine  10 mg Oral 4x Daily (AC & HS) Gricel Lepe MD      diphenoxylate-atropine  1 tablet Oral Q12H NICOLA Renner      fluticasone-vilanterol  1 puff Inhalation Daily Gricel Lepe MD      insulin lispro  1-6 Units Subcutaneous TID With Meals Gricel Lepe MD      loperamide  2 mg Oral 4x Daily PRN Gricel Lepe MD      melatonin  6 mg Oral HS Gricel Lepe MD      meropenem  500 mg Intravenous Q24H Gricel Lepe  mg (09/28/22 1650)    metoclopramide  5 mg Intravenous Q12H PRN Gricel Lepe MD      midodrine  10 mg Oral TID AC Gircel Lepe MD      ondansetron  4 mg Intravenous Q6H PRN Gricel Lepe MD      pantoprazole  40 mg Oral BID AC NICOLA Renner      trimethobenzamide  200 mg Intramuscular Q6H PRN Gricel Lepe MD      vancomycin  125 mg Oral BID Gricel Lepe MD          Today, Patient Was Seen By: Codi Camp    ** Please Note: Dictation voice to text software may have been used in the creation of this document   **

## 2022-09-29 NOTE — PROGRESS NOTES
Kanwal Cleaning  81 y o   male  1950  mrn [id number]    Assessment/Plan:  1  Sigmoid diverticulitis/Acute cholecystitis/Fever/Leukocytosis/  Diarrhea/DORA on CKD: Fevers have resolved and WBC count is down to 7 3K  He denies abd pain but still having diarrhea  C diff was neg x 2  Pt underwent placement of cholecystotomy tube today for tx of acute cholecystitis noted by HIDA scan  Bile cx was sent for cx  Repeat abd CT was done on 9/25 and showed resolution of diverticulitis but continued to show findings suggestive of cholecystitis  HIDA scan did not visualize the GB c/w acute cholecystitis  Repeat bld cx's are neg  Zosyn was changed to Meropenem on 9/26 when Pt developed fever  He remains on low dose po Vanco for C diff prophylaxis  Creatinine conts to be elevated and he remains on dialysis     Pt presented with weakness, right-sided abd pain, and N/V/D      On admission, he did not have fever WBC count was 10 2K  Creatinine was elevated at 8 4 c/w DORA on CKD  Abd CT showed decreased sigmoid diverticulitis and possible cholecystitis  He was started on Zosyn for tx of diverticulitis  LFT's were nml  C diff and enteric stool panel were neg, Dialysis was started on 9/16  He finished course of Zosyn on 9/22  He developed low grade fever on 9/22  N/V recurred on 9/23  He conts to have diarrhea requiring rectal tube  WBC count increased to 11 6K on 9/24 and procalcitonin was elevated at 6 39  Zosyn was restarted and Pt was placed on oral Vanco  Repeat C diff was neg   No further low grade fever and WBC count has decreased to 8 8K but procalcitonin increased slightly to 7 79      - Cont Meropenem while bile cx is pending - planning to cont abx tx through the weekend and if bile cx is neg and Pt conts to improve following placement of cholecystotomy, planning to stop abx on Monday  - Awaiting results of bile cx   - Cont oral Vanco for C diff prophylaxis since Pt needs further abx tx  - Cont tx of diarrhea as per GI - ?due to abx tx or possibly microscopic colitis  - Cont dialysis as per Renal for DORA on CKD - will adjust abx doses based on creatinine clearance  - Will cont to monitor for the development of toxicity to current abx tx  - I will review Pt's EMR daily and re-eval Pt on Monday but please contact the ID physician OC if any new probs arise       Subjective: Pt had placement of cholecystotomy tube today  Pt denies abd pain  He conts to have diarrhea  No fever and WBC count is nml  No probs with current abx tx    Objective:  VSS, Tmax: 99 2  HEENT:  No scleral icterus  NECK: Supple  CARDIAC:  RRR, nml S1, S2  LUNGS:  Clear  ABDOMEN: +BS, soft, nontender, Cholecystotomy tube - not much output  MUSCULOSKELETAL:  No right calf tenderness  EXTREMITIES:  +Left AKA  SKIN:  No rash      Labs:  CBC w/diff  Recent Labs     09/29/22  0652   WBC 7 13   HGB 8 7*   HCT 28 7*      NEUTOPHILPCT 67   LYMPHOPCT 16   MONOPCT 11   EOSPCT 3     BMP  Recent Labs     09/29/22  0652   K 3 7   CL 99   CO2 28   BUN 19   CREATININE 4 79*   CALCIUM 9 0     CMP  Recent Labs     09/28/22  0458 09/29/22  0652   K 3 5 3 7   CL 97 99   CO2 24 28   BUN 38* 19   CREATININE 7 81* 4 79*   CALCIUM 8 8 9 0   ALKPHOS 145*  --    ALT 12  --    AST 18  --         labrc    Cultures:  Lab Results   Component Value Date    BLOODCX No Growth at 72 hrs  09/25/2022    BLOODCX No Growth at 72 hrs  09/25/2022    BLOODCX No Growth After 5 Days  09/12/2022    BLOODCX No Growth After 5 Days  09/12/2022    BLOODCX No Growth After 5 Days  10/04/2021    BLOODCX No Growth After 5 Days  10/04/2021    BLOODCX No Growth After 5 Days  06/25/2020    BLOODCX No Growth After 5 Days  06/25/2020    BLOODCX Staphylococcus coagulase negative (A) 12/04/2019    BLOODCX No Growth After 5 Days  12/04/2019    BLOODCX No Growth After 5 Days  10/29/2019    BLOODCX No Growth After 5 Days  10/29/2019    BLOODCX No Growth After 5 Days  07/25/2019    BLOODCX No Growth After 5 Days  07/25/2019    BLOODCX No Growth After 5 Days  12/25/2018    BLOODCX No Growth After 5 Days  12/25/2018    BLOODCX No Growth After 5 Days  03/23/2018    BLOODCX No Growth After 5 Days  03/23/2018    BLOODCX No Growth After 5 Days  10/08/2017    BLOODCX No Growth After 5 Days  10/08/2017    BLOODCX No Growth After 5 Days  09/15/2016    BLOODCX No Growth After 5 Days   09/15/2016     Lab Results   Component Value Date    WOUNDCULT 4+ Growth of Proteus mirabilis (A) 07/25/2019    WOUNDCULT 4+ Growth of  07/25/2019    WOUNDCULT (A) 07/25/2019     4+ Growth of Methicillin Resistant Staphylococcus aureus    WOUNDCULT 1+ Growth of Proteus mirabilis (A) 07/25/2019    WOUNDCULT 4+ Growth of  07/25/2019    WOUNDCULT 3+ Growth of Staphylococcus aureus (A) 12/25/2018    WOUNDCULT 2+ Growth of  12/25/2018    WOUNDCULT 4+ Growth of Staphylococcus aureus 09/15/2016    WOUNDCULT 4+ Growth of Proteus mirabilis 09/15/2016    WOUNDCULT 4+ Growth of Mixed Skin Nini 09/15/2016     Lab Results   Component Value Date    URINECX >100,000 cfu/ml Escherichia coli (A) 12/05/2019     Lab Results   Component Value Date    SPUTUMCULTUR 4+ Growth of  07/11/2020    SPUTUMCULTUR 1+ Growth of Aspergillus fumigatus (A) 07/11/2020       MED:  Meropenem: #5  Oral Vanco; #6        Completed:  Zosyn x 11 days on 9/22  Zosyn x 2 days on 9/25        Current Facility-Administered Medications:     acetaminophen (TYLENOL) tablet 650 mg, 650 mg, Oral, Q6H PRN, Gina Sow MD    albuterol (PROVENTIL HFA,VENTOLIN HFA) inhaler 2 puff, 2 puff, Inhalation, Q6H PRN, Gina Sow MD    allopurinol (ZYLOPRIM) tablet 300 mg, 300 mg, Oral, Daily, Gina oSw MD, 300 mg at 09/29/22 0847    atorvastatin (LIPITOR) tablet 40 mg, 40 mg, Oral, After Mariajose Christensen MD, 40 mg at 09/29/22 1824    b complex-vitamin C-folic acid (NEPHROCAPS) capsule 1 capsule, 1 capsule, Oral, Daily With Herberth Gutierrez MD, 1 capsule at 09/29/22 1700    calcium acetate (PHOSLO) capsule 667 mg, 667 mg, Oral, TID With Meals, Radha Kelly MD, 667 mg at 09/29/22 1701    cholestyramine sugar free (QUESTRAN LIGHT) packet 4 g, 4 g, Oral, BID, Radha Kelly MD, 4 g at 09/28/22 2043    clotrimazole (LOTRIMIN) 1 % cream, , Topical, BID, Radha Kelly MD, Given at 09/29/22 1822    dicyclomine (BENTYL) capsule 10 mg, 10 mg, Oral, 4x Daily (AC & HS), Radha Kelly MD, 10 mg at 09/29/22 1701    diphenoxylate-atropine (LOMOTIL) 2 5-0 025 mg per tablet 1 tablet, 1 tablet, Oral, Q12H, NICOLA Christensen, 1 tablet at 09/29/22 1204    fluticasone-vilanterol (BREO ELLIPTA) 200-25 MCG/INH inhaler 1 puff, 1 puff, Inhalation, Daily, Radha Kelly MD, 1 puff at 09/29/22 0851    insulin lispro (HumaLOG) 100 units/mL subcutaneous injection 1-6 Units, 1-6 Units, Subcutaneous, TID With Meals, 1 Units at 09/29/22 1702 **AND** Fingerstick Glucose (POCT), , , 4x Daily AC and at bedtime, Radha Kelly MD    melatonin tablet 6 mg, 6 mg, Oral, HS, Radha Kelly MD, 6 mg at 09/28/22 2257    meropenem (MERREM) 500 mg in sodium chloride 0 9 % 50 mL IVPB, 500 mg, Intravenous, Q24H, Radha Kelly MD, Last Rate: 100 mL/hr at 09/29/22 1437, 500 mg at 09/29/22 1437    midodrine (PROAMATINE) tablet 10 mg, 10 mg, Oral, TID AC, Radha Kelly MD, 10 mg at 09/29/22 1701    ondansetron (ZOFRAN) injection 4 mg, 4 mg, Intravenous, Q6H PRN, Radha Kelly MD, 4 mg at 09/26/22 1023    [START ON 9/30/2022] pantoprazole (PROTONIX) EC tablet 40 mg, 40 mg, Oral, Early Morning, Anushka Lanier MD    trimethobenzamide Rasta Aas) IM injection 200 mg, 200 mg, Intramuscular, Q6H PRN, Radha Kelly MD, 200 mg at 09/23/22 1004    vancomycin (VANCOCIN) capsule 125 mg, 125 mg, Oral, BID, Radha Kelly MD, 125 mg at 09/29/22 1824    Principal Problem:    DORA (acute kidney injury) [facility])  Active Problems:    Chronic atrial fibrillation ([address] )    Morbid obesity ([address] )    Chronic combined systolic and diastolic congestive heart failure (HCC)    NALLELY (obstructive sleep apnea)    Diabetes mellitus, type 2 ([facility] )    Multiple myeloma ([facility] )    Ambulatory dysfunction    Anemia    Acute diverticulitis    Abnormal CT scan    Acute cholecystitis      Robert Cortez MD

## 2022-09-29 NOTE — ASSESSMENT & PLAN NOTE
· 9/18 Bradycardia with Junctional escape beats 20's overnight with hypotension  · No rate control meds d/c in august due to bradycardia  · TSH 0 4  · Coumadin on hold  · PT INR  · Cards consulted recommending CPAP HS and occurs with apnea episodes   · Patient is scheduled for cholecystomy tube placement today  · Will restart Coumadin after the procedure

## 2022-09-29 NOTE — ASSESSMENT & PLAN NOTE
9/23 this morning patient started to develop some nausea and vomiting, vomiting noted to be dark in color, no dark stools noted in rectal tube  Gi started patient on IV PPI b i d  Clear liquid diet  Most likely secondary to large hiatal hernia on previous EGD  9/24 rectal tube noted to have dark stools  N/V resolved, continues to have abdominal tenderness  Hg this morning is 8 7  CT abd/pelvis- Distended gallbladder with cholelithiasis, wall thickening, and pericholecystic inflammatory change  There appear to be calculi extending into the cystic duct  Findings can be due to acute cholecystitis in the appropriate clinical setting  Consider gallbladder ultrasound  2  Previously seen inflammatory changes related to acute sigmoid diverticulitis are no longer visualized "  RUQ US- " Again seen is a distended gallbladder with gallbladder wall thickening up to 15 mm, also containing intraluminal air, gallstones, sludge  Sonographic Lentz's sign is negative "  Surgical follow-up noted  As per surgery patient is not a candidate for cholecystectomy at this time  They recommended cholecystomy tube placement  · HIDA scan showed-Nonvisualization of the gallbladder concerning for acute cholecystitis  · Continue IV antibiotics  · Patient is scheduled for cholecystomy tube placement by IR today

## 2022-09-29 NOTE — ASSESSMENT & PLAN NOTE
· Follows with Dr Donna Murillo  · On Velcade and decadron last tx 9/6  · Free light chains ordered outpt f/u with oncology

## 2022-09-29 NOTE — ASSESSMENT & PLAN NOTE
Lab Results   Component Value Date    HGBA1C 6 3 (H) 08/05/2022       Recent Labs     09/28/22  1141 09/28/22  1702 09/28/22 2109 09/29/22  0727   POCGLU 157* 114 143* 120       Blood Sugar Average: Last 72 hrs:  (P) 145 [id number]   · Hold lantus  · SSI

## 2022-09-29 NOTE — PROGRESS NOTES
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 and hypertensive nephrosclerosis, cardiorenal syndrome and multiple myeloma  · Acute kidney injury etiology:  Pre renal azotemia/large volume GI output which likely converted to ATN  Severe azotemia and oliguria, recent DORA  · Started on hemodialysis 9/16  · Tunneled catheter placed on 09/22/2022  · Currently on a Monday, Wednesday, Friday hemodialysis schedule  · Last hemodialysis treatment 9/28: Tolerated 2 L ultrafiltration  Blood pressure stable  · Plan:  · Next hemodialysis treatment tomorrow  · No biochemical evidence of renal recovery, continue to monitor  · Avoid hypotension, nephrotoxic agents  Blood pressure/volume status:  · Midodrine 10 mg t i d  added to assist with volume removal/blood pressure maintenance on hemodialysis  · Blood pressure on the low side  Continue midodrine    Acute diverticulitis:  · C diff negative  · On Questran    Distended gallbladder with cholelithiasis:  · Right upper quadrant ultrasound:  Gallbladder distended, gallstones, sludge noted  Liver moderately enlarged  · Not a candidate for surgical intervention  · Cholecystostomy tube planned for today    Chronic systolic and diastolic combined CHF:  · Ejection fraction 45%, concentric hypertrophy  Diffuse hypokinesis with regional variations  · Volume removal on hemodialysis  · No evidence of decompensation    Anemia:  · Hemoglobin low but stable  Below baseline     · Not on Epogen 2/2 MM  · Iron deficient:  Iron saturation 10%  · Loading dose of Venofer on hold-concern for infection  · Transfuse for hemoglobin less than 7    Multiple myeloma:  · On Velcade and Decadron  · Follows with Dr Sharon Boxer    CKD MBD:  · Hyperphosphatemia:  On PhosLo, low phosphorus diet    Hypokalemia  · Replete as needed  · Current potassium acceptable  · Adjust K bath as needed on dialysis    Other:  · Chronic atrial fibrillation  · Hiatal hernia:  EGD today  No Demian lesions noted  3 mm polyp removed  Biliary stents in place draining  Concern for gastroparesis  · Morbid obesity  · Hyperlipidemia  · COPD  · Gout  · Diabetes mellitus type 2    DISPOSITION:  Hemodialysis tomorrow    SUBJECTIVE / 24H INTERVAL HISTORY:  Resting comfortably  Had significant disorientation and agitation overnight  OBJECTIVE:  Current Weight: Weight - Scale: 120 kg (264 lb 1 8 oz)  Vitals:    09/29/22 0600 09/29/22 0652 09/29/22 0752 09/29/22 0752   BP:  120/91 103/57 103/57   BP Location:       Pulse:  60 64 58   Resp:       Temp:  98 8 °F (37 1 °C) 98 2 °F (36 8 °C) 98 2 °F (36 8 °C)   TempSrc:       SpO2:  94% 95% 95%   Weight: 120 kg (264 lb 1 8 oz)      Height:           Intake/Output Summary (Last 24 hours) at 9/29/2022 1051  Last data filed at 9/28/2022 1757  Gross per 24 hour   Intake 740 ml   Output 2500 ml   Net -1760 ml     General:  Chronically ill-appearing very debilitated appearing, weak  Skin:  Rather pale, skin warm and dry  No rash noted  Eyes:  Sclera clear, anicteric  ENT:  Oropharynx moist  Neck:  Supple  Chest:  Chest clear anteriorly, normal effort, equal breath sounds, PermCath in place  CVS:  Normal heart rate, regular rhythm  Abdomen:  Abdomen obese, soft, bowel sounds present  Extremities:  No pitting edema    Neuro:  No acute deficits  Psych:  :  Resting in bed at this time, calm  Medications:    Current Facility-Administered Medications:     acetaminophen (TYLENOL) tablet 650 mg, 650 mg, Oral, Q6H PRN, Carmel Escobar MD    albuterol (PROVENTIL HFA,VENTOLIN HFA) inhaler 2 puff, 2 puff, Inhalation, Q6H PRN, Chemo Smalls MD    allopurinol (ZYLOPRIM) tablet 300 mg, 300 mg, Oral, Daily, Chemo Smalls MD, 300 mg at 09/29/22 0847    atorvastatin (LIPITOR) tablet 40 mg, 40 mg, Oral, After Fernando Puckett MD, 40 mg at 09/28/22 1750    b complex-vitamin C-folic acid (NEPHROCAPS) capsule 1 capsule, 1 capsule, Oral, Daily With Amalia Cortes MD, 1 capsule at 09/28/22 1651    calcium acetate (PHOSLO) capsule 667 mg, 667 mg, Oral, TID With Meals, Chemo Smalls MD, 667 mg at 09/29/22 0846    cholestyramine sugar free (QUESTRAN LIGHT) packet 4 g, 4 g, Oral, BID, Chemo Smalls MD, 4 g at 09/28/22 2043    clotrimazole (LOTRIMIN) 1 % cream, , Topical, BID, Chemo Smalls MD, Given at 09/29/22 3595    dicyclomine (BENTYL) capsule 10 mg, 10 mg, Oral, 4x Daily (AC & HS), Chemo Smalls MD, 10 mg at 09/29/22 0847    diphenoxylate-atropine (LOMOTIL) 2 5-0 025 mg per tablet 1 tablet, 1 tablet, Oral, Q12H, NICOLA Huang, 1 tablet at 09/28/22 2257    fluticasone-vilanterol (BREO ELLIPTA) 200-25 MCG/INH inhaler 1 puff, 1 puff, Inhalation, Daily, Chemo Smalls MD, 1 puff at 09/29/22 0851    insulin lispro (HumaLOG) 100 units/mL subcutaneous injection 1-6 Units, 1-6 Units, Subcutaneous, TID With Meals, 1 Units at 09/27/22 1621 **AND** Fingerstick Glucose (POCT), , , 4x Daily AC and at bedtime, Chemo Smalls MD    melatonin tablet 6 mg, 6 mg, Oral, HS, Chemo Smalls MD, 6 mg at 09/28/22 2257    meropenem (MERREM) 500 mg in sodium chloride 0 9 % 50 mL IVPB, 500 mg, Intravenous, Q24H, Chemo Smalls MD, Last Rate: 100 mL/hr at 09/28/22 1650, 500 mg at 09/28/22 1650    midodrine (PROAMATINE) tablet 10 mg, 10 mg, Oral, TID AC, Chemo Smalls MD, 10 mg at 09/29/22 0847    ondansetron (ZOFRAN) injection 4 mg, 4 mg, Intravenous, Q6H PRN, Chemo Smalls MD, 4 mg at 09/26/22 1023    [START ON 9/30/2022] pantoprazole (PROTONIX) EC tablet 40 mg, 40 mg, Oral, Early Morning, Dusty Barragan MD    trimethobenzamide Amedeo Brine) IM injection 200 mg, 200 mg, Intramuscular, Q6H PRN, Gina Sow MD, 200 mg at 09/23/22 1004    vancomycin (VANCOCIN) capsule 125 mg, 125 mg, Oral, BID, Gina Sow MD, 125 mg at 09/29/22 0846    Laboratory Results:  Results from last 7 days   Lab Units 09/29/22  0652 09/28/22  0458 09/27/22  0530 09/26/22  0448 09/25/22  2320 09/25/22  0509 09/25/22  0446 09/24/22  0446 09/23/22  1132 09/23/22  0407   WBC Thousand/uL 7 13 7 33 7 03 9 95 10 69*  --  8 81 11 63*  --  10 21*   HEMOGLOBIN g/dL 8 7* 8 2* 8 0* 8 2* 8 3*  --  8 5* 8 5*   < > 9 3*   HEMATOCRIT % 28 7* 27 5* 27 0* 26 9* 27 1*  --  27 8* 28 1*   < > 29 9*   PLATELETS Thousands/uL 345 318 232 199 201  --  189 163  --  208   POTASSIUM mmol/L 3 7 3 5 3 3*  --  3 8 3 7  --  4 3  --  3 8   CHLORIDE mmol/L 99 97 97  --  96 98  --  98  --  98   CO2 mmol/L 28 24 28  --  26 28  --  26  --  27   BUN mg/dL 19 38* 26*  --  38* 31*  --  21  --  22   CREATININE mg/dL 4 79* 7 81* 5 88*  --  7 69* 6 68*  --  4 92*  --  5 84*   CALCIUM mg/dL 9 0 8 8 8 8  --  8 6 8 8  --  8 8  --  8 8   MAGNESIUM mg/dL  --   --   --  2 0  --   --  2 2 2 1  --  1 6   PHOSPHORUS mg/dL  --   --   --   --   --  4 8*  --  3 4  --  4 4*    < > = values in this interval not displayed

## 2022-09-29 NOTE — ASSESSMENT & PLAN NOTE
· Baseline hgb 8  · Received IV venofer  · Trend H&H  · Transfuse for hgb <7  · Patient underwent EGD on 09/26 which showed One 3 mm polyp in the stomach removed with biopsy forceps  Large sliding hiatal hernia without Demian lesions     Biliary stents in place draining  Dilation of the stomach with large amount of retained   liquid upon entrance to the stomach   Suctioned out  Karin Rakes for element of   Gastroparesis  · Started on low-dose Reglan and continue Protonix

## 2022-09-30 ENCOUNTER — APPOINTMENT (INPATIENT)
Dept: DIALYSIS | Facility: HOSPITAL | Age: 72
DRG: 674 | End: 2022-09-30
Attending: INTERNAL MEDICINE
Payer: COMMERCIAL

## 2022-09-30 ENCOUNTER — TELEPHONE (OUTPATIENT)
Dept: NEPHROLOGY | Facility: CLINIC | Age: 72
End: 2022-09-30

## 2022-09-30 LAB
ALBUMIN SERPL BCP-MCNC: 2.4 G/DL (ref 3.5–5)
ALP SERPL-CCNC: 183 U/L (ref 46–116)
ALT SERPL W P-5'-P-CCNC: 14 U/L (ref 12–78)
ANION GAP SERPL CALCULATED.3IONS-SCNC: 11 MMOL/L (ref 4–13)
ANISOCYTOSIS BLD QL SMEAR: PRESENT
AST SERPL W P-5'-P-CCNC: 37 U/L (ref 5–45)
BASOPHILS # BLD MANUAL: 0.15 THOUSAND/UL (ref 0–0.1)
BASOPHILS NFR MAR MANUAL: 2 % (ref 0–1)
BILIRUB SERPL-MCNC: 0.5 MG/DL (ref 0.2–1)
BUN SERPL-MCNC: 24 MG/DL (ref 5–25)
CALCIUM ALBUM COR SERPL-MCNC: 10.9 MG/DL (ref 8.3–10.1)
CALCIUM SERPL-MCNC: 9.6 MG/DL (ref 8.3–10.1)
CHLORIDE SERPL-SCNC: 98 MMOL/L (ref 96–108)
CO2 SERPL-SCNC: 28 MMOL/L (ref 21–32)
CREAT SERPL-MCNC: 6.49 MG/DL (ref 0.6–1.3)
EOSINOPHIL # BLD MANUAL: 0.23 THOUSAND/UL (ref 0–0.4)
EOSINOPHIL NFR BLD MANUAL: 3 % (ref 0–6)
ERYTHROCYTE [DISTWIDTH] IN BLOOD BY AUTOMATED COUNT: 17.2 % (ref 11.6–15.1)
GFR SERPL CREATININE-BSD FRML MDRD: 7 ML/MIN/1.73SQ M
GLUCOSE SERPL-MCNC: 135 MG/DL (ref 65–140)
GLUCOSE SERPL-MCNC: 136 MG/DL (ref 65–140)
GLUCOSE SERPL-MCNC: 141 MG/DL (ref 65–140)
GLUCOSE SERPL-MCNC: 145 MG/DL (ref 65–140)
GLUCOSE SERPL-MCNC: 159 MG/DL (ref 65–140)
HCT VFR BLD AUTO: 31 % (ref 36.5–49.3)
HGB BLD-MCNC: 9.4 G/DL (ref 12–17)
INR PPP: 1.14 (ref 0.84–1.19)
LYMPHOCYTES # BLD AUTO: 1.43 THOUSAND/UL (ref 0.6–4.47)
LYMPHOCYTES # BLD AUTO: 19 % (ref 14–44)
MCH RBC QN AUTO: 26.4 PG (ref 26.8–34.3)
MCHC RBC AUTO-ENTMCNC: 30.3 G/DL (ref 31.4–37.4)
MCV RBC AUTO: 87 FL (ref 82–98)
MONOCYTES # BLD AUTO: 0.75 THOUSAND/UL (ref 0–1.22)
MONOCYTES NFR BLD: 10 % (ref 4–12)
NEUTROPHILS # BLD MANUAL: 4.97 THOUSAND/UL (ref 1.85–7.62)
NEUTS SEG NFR BLD AUTO: 66 % (ref 43–75)
PHOSPHATE SERPL-MCNC: 3 MG/DL (ref 2.3–4.1)
PLATELET # BLD AUTO: 454 THOUSANDS/UL (ref 149–390)
PLATELET BLD QL SMEAR: ABNORMAL
PMV BLD AUTO: 10.2 FL (ref 8.9–12.7)
POLYCHROMASIA BLD QL SMEAR: PRESENT
POTASSIUM SERPL-SCNC: 4 MMOL/L (ref 3.5–5.3)
PROT SERPL-MCNC: 7.3 G/DL (ref 6.4–8.4)
PROTHROMBIN TIME: 15.4 SECONDS (ref 11.6–14.5)
RBC # BLD AUTO: 3.56 MILLION/UL (ref 3.88–5.62)
RBC MORPH BLD: PRESENT
SODIUM SERPL-SCNC: 137 MMOL/L (ref 135–147)
WBC # BLD AUTO: 7.53 THOUSAND/UL (ref 4.31–10.16)

## 2022-09-30 PROCEDURE — 85610 PROTHROMBIN TIME: CPT | Performed by: INTERNAL MEDICINE

## 2022-09-30 PROCEDURE — 84100 ASSAY OF PHOSPHORUS: CPT | Performed by: INTERNAL MEDICINE

## 2022-09-30 PROCEDURE — 99232 SBSQ HOSP IP/OBS MODERATE 35: CPT | Performed by: INTERNAL MEDICINE

## 2022-09-30 PROCEDURE — 85027 COMPLETE CBC AUTOMATED: CPT | Performed by: INTERNAL MEDICINE

## 2022-09-30 PROCEDURE — 80053 COMPREHEN METABOLIC PANEL: CPT | Performed by: INTERNAL MEDICINE

## 2022-09-30 PROCEDURE — 85007 BL SMEAR W/DIFF WBC COUNT: CPT | Performed by: INTERNAL MEDICINE

## 2022-09-30 PROCEDURE — 82948 REAGENT STRIP/BLOOD GLUCOSE: CPT

## 2022-09-30 PROCEDURE — 99233 SBSQ HOSP IP/OBS HIGH 50: CPT | Performed by: INTERNAL MEDICINE

## 2022-09-30 RX ORDER — WARFARIN SODIUM 5 MG/1
5 TABLET ORAL
Status: DISCONTINUED | OUTPATIENT
Start: 2022-09-30 | End: 2022-10-04 | Stop reason: HOSPADM

## 2022-09-30 RX ORDER — SEVELAMER HYDROCHLORIDE 800 MG/1
800 TABLET, FILM COATED ORAL
Status: DISCONTINUED | OUTPATIENT
Start: 2022-09-30 | End: 2022-09-30

## 2022-09-30 RX ADMIN — CLOTRIMAZOLE: 0.01 CREAM TOPICAL at 09:51

## 2022-09-30 RX ADMIN — VANCOMYCIN HYDROCHLORIDE 125 MG: 125 CAPSULE ORAL at 16:55

## 2022-09-30 RX ADMIN — MIDODRINE HYDROCHLORIDE 10 MG: 5 TABLET ORAL at 15:32

## 2022-09-30 RX ADMIN — NEPHROCAP 1 CAPSULE: 1 CAP ORAL at 15:32

## 2022-09-30 RX ADMIN — INSULIN LISPRO 1 UNITS: 100 INJECTION, SOLUTION INTRAVENOUS; SUBCUTANEOUS at 16:08

## 2022-09-30 RX ADMIN — DICYCLOMINE HYDROCHLORIDE 10 MG: 10 CAPSULE ORAL at 06:19

## 2022-09-30 RX ADMIN — DICYCLOMINE HYDROCHLORIDE 10 MG: 10 CAPSULE ORAL at 22:50

## 2022-09-30 RX ADMIN — DIPHENOXYLATE HYDROCHLORIDE AND ATROPINE SULFATE 1 TABLET: 2.5; .025 TABLET ORAL at 22:50

## 2022-09-30 RX ADMIN — CLOTRIMAZOLE: 0.01 CREAM TOPICAL at 16:12

## 2022-09-30 RX ADMIN — MIDODRINE HYDROCHLORIDE 10 MG: 5 TABLET ORAL at 07:28

## 2022-09-30 RX ADMIN — DICYCLOMINE HYDROCHLORIDE 10 MG: 10 CAPSULE ORAL at 15:32

## 2022-09-30 RX ADMIN — WARFARIN SODIUM 5 MG: 5 TABLET ORAL at 16:55

## 2022-09-30 RX ADMIN — CHOLESTYRAMINE 4 G: 4 POWDER, FOR SUSPENSION ORAL at 20:51

## 2022-09-30 RX ADMIN — ATORVASTATIN CALCIUM 40 MG: 40 TABLET, FILM COATED ORAL at 16:55

## 2022-09-30 RX ADMIN — Medication 6 MG: at 22:50

## 2022-09-30 RX ADMIN — MEROPENEM 500 MG: 500 INJECTION, POWDER, FOR SOLUTION INTRAVENOUS at 16:09

## 2022-09-30 RX ADMIN — PANTOPRAZOLE SODIUM 40 MG: 40 TABLET, DELAYED RELEASE ORAL at 05:25

## 2022-09-30 RX ADMIN — FLUTICASONE FUROATE AND VILANTEROL TRIFENATATE 1 PUFF: 200; 25 POWDER RESPIRATORY (INHALATION) at 09:51

## 2022-09-30 NOTE — PROGRESS NOTES
Cristofer Hoyt PROGRESS NOTE   Trishcecilio Jimenez 81 y o  male MRN: [id number]  Unit/Bed#: -01 Encounter: [id number]  Reason for Consult:  Acute kidney injury on CKD    ASSESSMENT and PLAN:  73-year-old male with history of multiple myeloma treated with Velcade, CKD, CHF, ejection fraction 45% presenting with acute diverticulitis  Nephrology following for management of acute kidney injury    Acute kidney injury (POA) on CKD stage IIIB:  · Baseline creatinine 1 2-1 4  · Underlying CKD due to diabetic nephropathy and hypertensive nephrosclerosis, cardiorenal syndrome and multiple myeloma  · Acute kidney injury etiology:  Pre renal azotemia/large volume GI output which likely converted to ATN  Severe azotemia and oliguria, recent DORA  · Started on hemodialysis 9/16  · Tunneled catheter placed on 09/22/2022  · Currently on a Monday, Wednesday, Friday hemodialysis schedule  · No evidence of renal recovery, no urine or urine occurrences recorded  · Continue current schedule  · Plan:  · Hemodialysis today  Likely reaching target weight, will go for 1 L ultrafiltration (net) today and monitor  · Outpatient dialysis schedule established at [facility] every Monday, Wednesday, Friday  Chair time 1115 hours  · Continue to monitor for renal recovery  · Avoid hypotension, nephrotoxic agents  Blood pressure/volume status:  · Midodrine 10 mg t i d  added to assist with volume removal/blood pressure maintenance on hemodialysis  · Blood pressure on the low side  Continue midodrine    Acute diverticulitis:  · C diff negative  · On Questran  · Fecal management system, less stool output noted    Distended gallbladder with cholelithiasis:  · Right upper quadrant ultrasound:  Gallbladder distended, gallstones, sludge noted  Liver moderately enlarged    · Not a candidate for surgical intervention  · Cholecystostomy tube placed on 09/29  · Continues on antibiotic therapy    Chronic systolic and diastolic combined CHF:  · Ejection fraction 45%, concentric hypertrophy  Diffuse hypokinesis with regional variations  · Volume removal on hemodialysis as tolerated  · No evidence of decompensation    Anemia:  · Hemoglobin low but stable  Below baseline  · Not on Epogen 2/2 MM  · Iron deficient:  Iron saturation 10%  · Loading dose of Venofer on hold-concern for infection  · Transfuse for hemoglobin less than 7    Multiple myeloma:  · On Velcade and Decadron  · Follows with Dr Mihir Marina    CKD MBD:  · Hyperphosphatemia:  Resolved, current phosphorus level down to 3 0  May be a reduction phosphorus level partially due to decreased intake with NPO/clear liquid diet  · On calcium acetate  · Plan:  · Discontinue calcium acetate since corrected calcium is elevated to 10 9  · Use a calcium based binder if needed moving forward at this time will hold since phosphorus level is 3 0  · Recommended low phosphorus diet moving forward  Hypokalemia  · Resolved, 4 0  · Adjust K bath as needed on dialysis    Other:  · Chronic atrial fibrillation  · Hiatal hernia:  EGD today  No Demian lesions noted  3 mm polyp removed  Biliary stents in place draining  Concern for gastroparesis  · Morbid obesity  · Hyperlipidemia  · COPD  · Gout  · Diabetes mellitus type 2    DISPOSITION:  Hemodialysis today  No plan for discharge at this time  Continues to require antibiotic therapy  SUBJECTIVE / 24H INTERVAL HISTORY:  No acute complaints    Having a clear liquid breakfast     OBJECTIVE:  Current Weight: Weight - Scale: 121 kg (266 lb 1 5 oz)  Vitals:    09/30/22 0024 09/30/22 0525 09/30/22 0600 09/30/22 0709   BP: 97/50 114/58  108/52   BP Location: Left arm      Pulse: 65 59  68   Resp: 16   18   Temp: 98 9 °F (37 2 °C)   98 3 °F (36 8 °C)   TempSrc: Oral      SpO2: 97% 94%  96%   Weight:   121 kg (266 lb 1 5 oz)    Height:           Intake/Output Summary (Last 24 hours) at 9/30/2022 0748  Last data filed at 9/30/2022 0531  Gross per 24 hour Intake 462 ml   Output 350 ml   Net 112 ml     General:  Chronically ill-appearing, debilitated, weak  Skin:  Skin warm and dry, pale, no rash  Eyes:  Sclera clear, anicteric  ENT:  Oropharynx moist  Neck:  Supple   Chest:  PermCath right chest, chest clear anteriorly with equal breath sounds, normal effort  CVS:  Normal heart rate, regular rhythm  No murmur rub or gallop appreciated  Abdomen:  Obese, nondistended, cholecystostomy tube present  Extremities:  No edema  Neuro:  Alert and oriented  Psych:  :  Resting in bed comfortably  Normal affect    Medications:    Current Facility-Administered Medications:     acetaminophen (TYLENOL) tablet 650 mg, 650 mg, Oral, Q6H PRN, Deysi Ferreira MD    albuterol (PROVENTIL HFA,VENTOLIN HFA) inhaler 2 puff, 2 puff, Inhalation, Q6H PRN, Deysi Ferreira MD    allopurinol (ZYLOPRIM) tablet 300 mg, 300 mg, Oral, Daily, Deysi Ferreira MD, 300 mg at 09/29/22 0847    atorvastatin (LIPITOR) tablet 40 mg, 40 mg, Oral, After Jennie Riley MD, 40 mg at 09/29/22 1824    b complex-vitamin C-folic acid (NEPHROCAPS) capsule 1 capsule, 1 capsule, Oral, Daily With Li Hallman MD, 1 capsule at 09/29/22 1700    cholestyramine sugar free (QUESTRAN LIGHT) packet 4 g, 4 g, Oral, BID, Deysi Ferreira MD, 4 g at 09/29/22 2239    clotrimazole (LOTRIMIN) 1 % cream, , Topical, BID, Deysi Ferreira MD, Given at 09/29/22 1822    dicyclomine (BENTYL) capsule 10 mg, 10 mg, Oral, 4x Daily (AC & HS), Deysi Ferreira MD, 10 mg at 09/30/22 4386    diphenoxylate-atropine (LOMOTIL) 2 5-0 025 mg per tablet 1 tablet, 1 tablet, Oral, Q12H, Elsedricka Lab, CRNP, 1 tablet at 09/29/22 2252    fluticasone-vilanterol (BREO ELLIPTA) 200-25 MCG/INH inhaler 1 puff, 1 puff, Inhalation, Daily, Deysi Ferreira MD, 1 puff at 09/29/22 0851    insulin lispro (HumaLOG) 100 units/mL subcutaneous injection 1-6 Units, 1-6 Units, Subcutaneous, TID With Meals, 1 Units at 09/29/22 1702 **AND** Fingerstick Glucose (POCT), , , 4x Daily AC and at bedtime, Morales Ureña MD    melatonin tablet 6 mg, 6 mg, Oral, HS, Morales Ureña MD, 6 mg at 09/29/22 2239    meropenem (MERREM) 500 mg in sodium chloride 0 9 % 50 mL IVPB, 500 mg, Intravenous, Q24H, Morales Ureña MD, Last Rate: 100 mL/hr at 09/29/22 1437, 500 mg at 09/29/22 1437    midodrine (PROAMATINE) tablet 10 mg, 10 mg, Oral, TID AC, Morales Ureña MD, 10 mg at 09/30/22 0728    ondansetron (ZOFRAN) injection 4 mg, 4 mg, Intravenous, Q6H PRN, Morales Ureña MD, 4 mg at 09/26/22 1023    pantoprazole (PROTONIX) EC tablet 40 mg, 40 mg, Oral, Early Morning, Gustavo Reyes MD, 40 mg at 09/30/22 0525    trimethobenzamide (TIGAN) IM injection 200 mg, 200 mg, Intramuscular, Q6H PRN, Morales Ureña MD, 200 mg at 09/23/22 1004    vancomycin (VANCOCIN) capsule 125 mg, 125 mg, Oral, BID, Morales Ureña MD, 125 mg at 09/29/22 1824    Laboratory Results:  Results from last 7 days   Lab Units 09/30/22  0620 09/29/22  9477 09/28/22  0458 09/27/22  0530 09/26/22  0448 09/25/22  2320 09/25/22  0509 09/25/22  0446 09/24/22  0446   WBC Thousand/uL 7 53 7 13 7 33 7 03 9 95 10 69*  --  8 81 11 63*   HEMOGLOBIN g/dL 9 4* 8 7* 8 2* 8 0* 8 2* 8 3*  --  8 5* 8 5*   HEMATOCRIT % 31 0* 28 7* 27 5* 27 0* 26 9* 27 1*  --  27 8* 28 1*   PLATELETS Thousands/uL 454* 345 318 232 199 201  --  189 163   POTASSIUM mmol/L 4 0 3 7 3 5 3 3*  --  3 8 3 7  --  4 3   CHLORIDE mmol/L 98 99 97 97  --  96 98  --  98   CO2 mmol/L 28 28 24 28  --  26 28  --  26   BUN mg/dL 24 19 38* 26*  --  38* 31*  --  21   CREATININE mg/dL 6 49* 4 79* 7 81* 5 88*  --  7 69* 6 68*  --  4 92*   CALCIUM mg/dL 9 6 9 0 8 8 8 8  --  8 6 8 8  --  8 8   MAGNESIUM mg/dL  --   --   --   --  2 0  --   --  2 2 2 1   PHOSPHORUS mg/dL 3 0  --   --   --   --   --  4 8*  --  3 4

## 2022-09-30 NOTE — CASE MANAGEMENT
Case Management Discharge Planning Note    Patient name Gasconade Blind  Location /-24 MRN [id number]  : 1950 Date 2022       Current Admission Date: 2022  Current Admission Diagnosis:DORA (acute kidney injury) [facility])   Patient Active Problem List    Diagnosis Date Noted    Acute cholecystitis 2022    Hypokalemia due to excessive gastrointestinal loss of potassium 09/15/2022    Abnormal CT scan 2022    Acute diverticulitis 2022    [facility] 2022    Anemia 10/04/2021    Supratherapeutic INR 10/04/2021    Ambulatory dysfunction 2021    Need for influenza vaccination 2020    Venous insufficiency (chronic) (peripheral) 2020    Gout 2019    Mass of psoas muscle 2019    Hiatal hernia 10/29/2019    Above knee amputation of left lower extremity ([facility] ) 2019    Multiple myeloma ([facility] ) 2019    Chronic obstructive pulmonary disease, unspecified ([facility] ) 2019    Hypertensive chronic kidney disease w stg 1-4/unsp chr kdny 2019    DORA (acute kidney injury) ([facility] ) 2019    Diabetes mellitus, type 2 ([facility] ) 2019    NALLELY (obstructive sleep apnea) 2019    Moderate persistent asthma without complication     Peripheral vascular disease ([facility] ) 2019    Chronic combined systolic and diastolic congestive heart failure ([facility] ) 10/11/2017    Chronic atrial fibrillation ([facility] ) 10/09/2017    Morbid obesity ([facility] ) 10/09/2017    Gallstones 2017    Diarrhea 2016    Chronic venous hypertension, right 09/15/2016    Onychomycosis 10/27/2015    Diabetes, polyneuropathy ([facility] ) 2014    GERD (gastroesophageal reflux disease) 2014    Hyperlipidemia 2014      LOS (days): 18  Geometric Mean LOS (GMLOS) (days): 5 90  Days to GMLOS:-11 7     OBJECTIVE:  Risk of Unplanned Readmission Score: 35 62         Current admission status: Inpatient Preferred Pharmacy:   [address] [address]  Phone: [phone] Fax: [phone]    Primary Care Provider: Zenia Arriaza MD    Primary Insurance: [facility]  Secondary Insurance:     DISCHARGE DETAILS:      IMM Given (Date):: 09/30/22  IMM Given to[de-identified] Patient  IMM reviewed with patient, patient agrees with discharge determination

## 2022-09-30 NOTE — ASSESSMENT & PLAN NOTE
Wt Readings from Last 3 Encounters:   09/30/22 121 kg (266 lb 1 5 oz)   09/06/22 117 kg (257 lb 15 oz)   08/30/22 118 kg (260 lb)     · EF 45% decreased RV fx  · I/O  · Daily weight  · Currently being managed with HD  · Demadex discontinued due to low bp and minimal urine output

## 2022-09-30 NOTE — ASSESSMENT & PLAN NOTE
9/23 this morning patient started to develop some nausea and vomiting, vomiting noted to be dark in color, no dark stools noted in rectal tube  Gi started patient on IV PPI b i d  Clear liquid diet  Most likely secondary to large hiatal hernia on previous EGD  9/24 rectal tube noted to have dark stools  N/V resolved, continues to have abdominal tenderness  Hg this morning is 9 4  CT abd/pelvis- Distended gallbladder with cholelithiasis, wall thickening, and pericholecystic inflammatory change  There appear to be calculi extending into the cystic duct  Findings can be due to acute cholecystitis in the appropriate clinical setting  Consider gallbladder ultrasound  2  Previously seen inflammatory changes related to acute sigmoid diverticulitis are no longer visualized "  RUQ US- " Again seen is a distended gallbladder with gallbladder wall thickening up to 15 mm, also containing intraluminal air, gallstones, sludge  Sonographic Lentz's sign is negative "  Surgical follow-up noted  As per surgery patient is not a candidate for cholecystectomy at this time  They recommended cholecystomy tube placement  · HIDA scan showed-Nonvisualization of the gallbladder concerning for acute cholecystitis    · Continue IV antibiotics  · Patient i underwent cholecystomy tube placement by IR on September 29  · Follow-up culture results  · ID recommends continue antibiotics through weekend  · Patient is tolerating diet

## 2022-09-30 NOTE — ASSESSMENT & PLAN NOTE
· Baseline hgb 8  · Received IV venofer  · Trend H&H  · Transfuse for hgb <7  · Patient underwent EGD on 09/26 which showed One 3 mm polyp in the stomach removed with biopsy forceps  Large sliding hiatal hernia without Demian lesions     Biliary stents in place draining  Dilation of the stomach with large amount of retained   liquid upon entrance to the stomach   Suctioned out  Michelle Odom for element of   Gastroparesis  · Started on low-dose Reglan and continue Protonix

## 2022-09-30 NOTE — ASSESSMENT & PLAN NOTE
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

## 2022-09-30 NOTE — WOUND OSTOMY CARE
Progress Note - Wound   Marina Pointlisandro 81 y o  male MRN: [id number]  Unit/Bed#: -01 Encounter: [id number]        Assessment:   Patient seen today for wound care follow up visit  Patient is in bed, currently receiving dialysis treatment  Patient is being repositioned using green foam wedges and currently wearing prevalon boot  Patient does not urinate often and currently has indwelling fecal management system for his loose, incontinent stools  1  Right heel diabetic wound- small full thickness wound with pink tissue, much improved from previous assessment  Denies pain, small amount of drainage  Applied new Allevyn heel foam and prevalon boot  Unable to photo or assess sacral/buttocks due to dialysis, continue same treatment  Photo from previous assessment  No induration, fluctuance, odor, warmth/temperature differences, redness, or purulence noted to the above noted wounds and skin areas assessed  Patient tolerated well- no s/s of non-verbal pain or discomfort observed during the encounter  Bedside nurse aware of plan of care  See flow sheets for more detailed assessment findings  Wound care will continue to follow  Skin Care Plan:  1-Cleanse sacro-buttocks with soap and water  Apply Calazime Cream TID and PRN  2-Turn/reposition q2h or when medically stable for pressure re-distribution on skin   3-Elevate heels to offload pressure  4-Moisturize skin daily with skin nourishing cream  5-Ehob cushion in chair when out of bed  6-Right Heel: Cleanse heel and pat dry  Apply Heel Allevyn Foam Dressing  Apply Hinkle Sepulveda  Change every other day and PRN         Wound 09/22/22 Heel Right (Active)   Wound Image   09/30/22 0900   Wound Description Granulation tissue;Pink 09/30/22 0900   Yi-wound Assessment Pink;Scar Tissue 09/30/22 0900   Wound Length (cm) 1 5 cm 09/30/22 0900   Wound Width (cm) 1 5 cm 09/30/22 0900   Wound Depth (cm) 0 1 cm 09/30/22 0900   Wound Surface Area (cm^2) 2 25 cm^2 09/30/22 0900   Wound Volume (cm^3) 0 225 cm^3 09/30/22 0900   Calculated Wound Volume (cm^3) 0 23 cm^3 09/30/22 0900   Change in Wound Size % 0 09/30/22 0900   Tunneling 0 cm 09/30/22 0900   Tunneling in depth located at 0 09/30/22 0900   Undermining 0 09/30/22 0900   Undermining is depth extending from 0 09/30/22 0900   Wound Site Closure LESLEE 09/30/22 0900   Drainage Amount None 09/30/22 0900   Drainage Description Other (Comment) 09/23/22 0800   Non-staged Wound Description Partial thickness 09/30/22 0900   Treatments Cleansed 09/30/22 0900   Dressing Foam, Silicon (eg  Allevyn, etc) 09/30/22 0900   Wound packed? No 09/30/22 0900   Packing- # removed 0 09/30/22 0900   Packing- # inserted 0 09/30/22 0900   Dressing Changed New 09/30/22 0900   Patient Tolerance Tolerated well 09/30/22 0900   Dressing Status Clean;Dry; Intact 09/30/22 0900       Wound 09/23/22 Abrasion(s) Buttocks Right (Active)   Wound Image   09/23/22 0800   Wound Description LESLEE 09/28/22 0900   Pressure Injury Stage O 09/23/22 0800   Yi-wound Assessment Fragile 09/23/22 0800   Wound Length (cm) 2 cm 09/23/22 0800   Wound Width (cm) 1 cm 09/23/22 0800   Wound Depth (cm) 0 1 cm 09/23/22 0800   Wound Surface Area (cm^2) 2 cm^2 09/23/22 0800   Wound Volume (cm^3) 0 2 cm^3 09/23/22 0800   Calculated Wound Volume (cm^3) 0 2 cm^3 09/23/22 0800   Drainage Amount Scant 09/23/22 0800   Drainage Description Serosanguineous 09/23/22 0800   Non-staged Wound Description Partial thickness 09/23/22 0800   Treatments Cleansed;Site care 09/23/22 0800   Dressing Foam, Silicon (eg  Allevyn, etc) 09/28/22 0900   Wound packed? No 09/23/22 0800   Dressing Changed Changed 09/24/22 0900   Patient Tolerance Tolerated well 09/24/22 0900   Dressing Status Clean;Dry; Intact 09/28/22 0900         Steffi SOTON, RN, [facility]

## 2022-09-30 NOTE — ASSESSMENT & PLAN NOTE
· Follows with Dr Ariel Menezes  · On Velcade and decadron last tx 9/6  · Free light chains ordered outpt f/u with oncology

## 2022-09-30 NOTE — PROGRESS NOTES
[place]  Progress Note - Naima Salcido 1950, 81 y o  male MRN: [id number]  Unit/Bed#: -Dariusz Encounter: [id number]  Primary Care Provider: Joao Christianson MD   Date and time admitted to hospital: 9/12/2022  4:09 PM    Acute cholecystitis  Assessment & Plan  9/23 this morning patient started to develop some nausea and vomiting, vomiting noted to be dark in color, no dark stools noted in rectal tube  Gi started patient on IV PPI b i d  Clear liquid diet  Most likely secondary to large hiatal hernia on previous EGD  9/24 rectal tube noted to have dark stools  N/V resolved, continues to have abdominal tenderness  Hg this morning is 9 4  CT abd/pelvis- Distended gallbladder with cholelithiasis, wall thickening, and pericholecystic inflammatory change  There appear to be calculi extending into the cystic duct  Findings can be due to acute cholecystitis in the appropriate clinical setting  Consider gallbladder ultrasound  2  Previously seen inflammatory changes related to acute sigmoid diverticulitis are no longer visualized "  RUQ US- " Again seen is a distended gallbladder with gallbladder wall thickening up to 15 mm, also containing intraluminal air, gallstones, sludge  Sonographic Lentz's sign is negative "  Surgical follow-up noted  As per surgery patient is not a candidate for cholecystectomy at this time  They recommended cholecystomy tube placement  · HIDA scan showed-Nonvisualization of the gallbladder concerning for acute cholecystitis  · Continue IV antibiotics  · Patient i underwent cholecystomy tube placement by IR on September 29  · Follow-up culture results  · ID recommends continue antibiotics through weekend  · Patient is tolerating diet    Abnormal CT scan  Assessment & Plan  · CTAP-common bile duct stent with pneumobilia and air within gallblader  Distended gallbladder with cholelithiasis     · RUQ u/s distended gallbladder with air within stent in CBD similar to CT  · Surgery consulted  If concern for acute cholecystitis not surgical candidate would need per vanessa tube  · GI following  · ERCP 8/29/2022, Dr Jackie Fitzpatrick, Read Fus esophagus seen during EGD, biopsy obtained   ERCP performed with endoscopic mucosal resection of abnormal looking ampullary tissue, PD, CBD stent placed  · No s/s acute cholecystitis monitor   · Surgical and GI follow-up noted    Acute diverticulitis  Assessment & Plan  · 2nd episode in one month (tx with 10 days antibx 8/2-8/11)  · CTAP acute diverticulitis  · GI following  · 7/25 Colonoscopy--2 polyps removed, Mild diverticula in the descending colon and sigmoid colon, Small, internal hemorrhoids  · 9/22 finished 10 day course of zosyn  · Questran started 9/17   · Imodium PRN  · Bentyl added 9/18 by GI  · advance diet per gi recommendations  · Gi suspects  multifactorial probably were related to the antibiotics that he is receiving for the diverticulitis as well as a chemo effect from his myeloma  May need to hold chemo on discharge for a period of time  Treatment at this point is supportive  · Monitor rectal tube output, Stool appears much darker today  · After discontinuation of Zosyn the following day on 09/23 patient started to develop leukocytosis with elevated procalcitonin  · Consulted Infectious Disease  · C diff is negative  · 9/24 Started patient on p o  Vancomycin and Zosyn  · ID switched patient to [address] [place] [address]  Continue Merrem and oral vancomycin    Anemia  Assessment & Plan  · Baseline hgb 8  · Received IV venofer  · Trend H&H  · Transfuse for hgb <7  · Patient underwent EGD on 09/26 which showed One 3 mm polyp in the stomach removed with biopsy forceps  Large sliding hiatal hernia without Demian lesions     Biliary stents in place draining  Dilation of the stomach with large amount of retained   liquid upon entrance to the stomach   Suctioned out  [employer] for element of   Gastroparesis  · Started on low-dose Reglan and continue Protonix      Ambulatory dysfunction  Assessment & Plan  · 2/2 L AKA  · PT/OT    Multiple myeloma ([facility] )  Assessment & Plan  · Follows with Dr Shannan May  · On Velcade and decadron last tx 9/6  · Free light chains ordered outpt f/u with oncology    Diabetes mellitus, type 2 [facility])  Assessment & Plan  Lab Results   Component Value Date    HGBA1C 6 3 (H) 08/05/2022       Recent Labs     09/29/22  1122 09/29/22  1557 09/29/22  2216 09/30/22  0708   POCGLU 143* 169* 139 136       Blood Sugar Average: Last 72 hrs:  (P) 143 [id number]   · Hold lantus  · SSI    NALLELY (obstructive sleep apnea)  Assessment & Plan  · Refuses cpap    Chronic combined systolic and diastolic congestive heart failure (HCC)  Assessment & Plan  Wt Readings from Last 3 Encounters:   09/30/22 121 kg (266 lb 1 5 oz)   09/06/22 117 kg (257 lb 15 oz)   08/30/22 118 kg (260 lb)     · EF 45% decreased RV fx  · I/O  · Daily weight  · Currently being managed with HD  · Demadex discontinued due to low bp and minimal urine output          Morbid obesity ([place] [address] )  Assessment & Plan  · Dietary education    Chronic atrial fibrillation [facility])  Assessment & Plan  · 9/18 Bradycardia with Junctional escape beats 20's overnight with hypotension  · No rate control meds d/c in august due to bradycardia  · TSH 0 4  · Coumadin on hold  · PT INR  · Cards consulted recommending CPAP HS and occurs with apnea episodes   · Patient is scheduled for cholecystomy tube placement today  · Will restart Coumadin after the procedure        * DORA (acute kidney injury) ([facility] )  Assessment & Plan  · DORA on CKD 3  · Most recent d/c creat 3-3 7 prior in 2 range  · CT no hydro, nonobstructing calculi  · UA neg  · Nephrology following  · 9/15 R tunneled HD cath placed  · 9/15 and 9/16 IHD  · Monitor for renal recovery remains oliguric  · S/p IHD on/ 9/19   · Currently on m/w/f schedule with no signs of renal recovery  · Temporary to PermCath conversion 09/21/2022  · No signs of renal recovery  · Continue dialysis as per Nephrology  · Nephrology on board      Labs & Imaging: I have personally reviewed pertinent reports  VTE Prophylaxis: in place  Code Status:   Level 1 - Full Code    Patient Centered Rounds: I have performed bedside rounds with nursing staff today  Discussions with Specialists or Other Care Team Provider:  GI    Education and Discussions with Family / Patient:  Patient declined family update    Current Length of Stay: 25 day(s)    Current Patient Status: Inpatient   Certification Statement: The patient will continue to require additional inpatient hospital stay due to see my assessment and plan  Subjective:   Patient is seen and examined at bedside  Abdominal pain is improved  Denies any other complaint  Afebrile  All other ROS are negative  Objective:    Vitals: Blood pressure 128/71, pulse 72, temperature 98 °F (36 7 °C), temperature source Oral, resp  rate 18, height 6' 3" (1 905 m), weight 121 kg (266 lb 1 5 oz), SpO2 97 %  ,Body mass index is 33 26 kg/m²  SPO2 RA Rest    Flowsheet Row ED to Hosp-Admission (Current) from 9/12/2022 in [address] Med Surg Unit   SpO2 97 %   SpO2 Activity At Rest   O2 Device None (Room air)   O2 Flow Rate --        I&O:     Intake/Output Summary (Last 24 hours) at 9/30/2022 1031  Last data filed at 9/30/2022 0850  Gross per 24 hour   Intake 662 ml   Output 350 ml   Net 312 ml       Physical Exam:    General- Alert, lying comfortably in bed  Not in any acute distress  Neck- Supple, No JVD  CVS- regular, S1 and S2 normal  Chest- Bilateral Air entry, No rhochi, crackles or wheezing present  Abdomen- soft, nontender, cholecystomy tube in place, not distended, no guarding or rigidity, BS+  Extremities-  No pedal edema, No calf tenderness                         Normal ROM in all extremities  CNS-   Alert, awake and orientedx3  No focal deficits present      Invasive Devices  Report    Central Venous Catheter Line  Duration           CVC Central Lines 09/22/22 Double 8 days          Peripheral Intravenous Line  Duration           Peripheral IV 09/27/22 Dorsal (posterior); Right Hand 3 days          Hemodialysis Catheter  Duration           HD Permanent Double Catheter 8 days          Drain  Duration           Rectal Tube With balloon 14 days    Cholecystostomy Tube RUQ <1 day                      Social History  reviewed  Family History   Problem Relation Age of Onset    Other Mother         CARDIAC DISORDER     Diabetes Mother     Cancer Father     Other Family         CARDIAC DISORDER     Diabetes Family     Cancer Family     Mental illness Family     Kidney disease Sister     Diabetes Sister     reviewed    Meds:  Current Facility-Administered Medications   Medication Dose Route Frequency Provider Last Rate Last Admin    acetaminophen (TYLENOL) tablet 650 mg  650 mg Oral Q6H PRN Micaela Mahajan MD        albuterol (PROVENTIL HFA,VENTOLIN HFA) inhaler 2 puff  2 puff Inhalation Q6H PRN Micaela Mahajan MD        allopurinol (ZYLOPRIM) tablet 300 mg  300 mg Oral Daily Micaela Mahajan MD   300 mg at 09/29/22 0847    atorvastatin (LIPITOR) tablet 40 mg  40 mg Oral After Sindy Acuna MD   40 mg at 09/29/22 1824    b complex-vitamin C-folic acid (NEPHROCAPS) capsule 1 capsule  1 capsule Oral Daily With Wicho Aceves MD   1 capsule at 09/29/22 1700    cholestyramine sugar free (QUESTRAN LIGHT) packet 4 g  4 g Oral BID Micaela Mahajan MD   4 g at 09/29/22 2239    clotrimazole (LOTRIMIN) 1 % cream   Topical BID Micaela Mahajan MD   Given at 09/30/22 0951    dicyclomine (BENTYL) capsule 10 mg  10 mg Oral 4x Daily (AC & HS) Micaela Mahajan MD   10 mg at 09/30/22 5812    diphenoxylate-atropine (LOMOTIL) 2 5-0 025 mg per tablet 1 tablet  1 tablet Oral Q12H NICOLA Farrell   1 tablet at 09/29/22 2252    fluticasone-vilanterol (BREO ELLIPTA) 200-25 MCG/INH inhaler 1 puff  1 puff Inhalation Daily Micaela Mahajan MD   1 puff at 09/30/22 0951    insulin lispro (HumaLOG) 100 units/mL subcutaneous injection 1-6 Units  1-6 Units Subcutaneous TID With Meals Arleen Werner MD   1 Units at 09/29/22 1702    melatonin tablet 6 mg  6 mg Oral HS Arleen Werner MD   6 mg at 09/29/22 2239    meropenem (MERREM) 500 mg in sodium chloride 0 9 % 50 mL IVPB  500 mg Intravenous Q24H Arleen Werner  mL/hr at 09/29/22 1437 500 mg at 09/29/22 1437    midodrine (PROAMATINE) tablet 10 mg  10 mg Oral TID [facility] Arleen Werner MD   10 mg at 09/30/22 0728    ondansetron (ZOFRAN) injection 4 mg  4 mg Intravenous Q6H PRN Arleen Werenr MD   4 mg at 09/26/22 1023    pantoprazole (PROTONIX) EC tablet 40 mg  40 mg Oral Early Morning Salas Jacobsen MD   40 mg at 09/30/22 0525    trimethobenzamide (TIGAN) IM injection 200 mg  200 mg Intramuscular Q6H PRN Arleen Werner MD   200 mg at 09/23/22 1004    vancomycin (VANCOCIN) capsule 125 mg  125 mg Oral BID Arleen Werner MD   125 mg at 09/29/22 1824      Medications Prior to Admission   Medication    acetaminophen (TYLENOL) 500 mg tablet    albuterol (PROVENTIL HFA,VENTOLIN HFA) 90 mcg/act inhaler    Alcohol Swabs (ALCOHOL PREP) 70 % PADS    allopurinol (ZYLOPRIM) 300 mg tablet    ammonium lactate (LAC-HYDRIN) 12 % lotion    atorvastatin (LIPITOR) 40 mg tablet    BD AUTOSHIELD DUO 30G X 5 MM MISC    BD INSULIN SYRINGE U/F 31G X 5/16" 0 5 ML MISC    BD PEN NEEDLE HILARIO U/F 32G X 4 MM MISC    bortezomib (VELCADE)    cholestyramine sugar free (QUESTRAN LIGHT) 4 g packet    clotrimazole (LOTRIMIN) 1 % cream    Easy Touch Safety Lancets 28G MISC    fluticasone (FLONASE) 50 mcg/act nasal spray    fluticasone-salmeterol (ADVAIR DISKUS, WIXELA INHUB) 250-50 mcg/dose inhaler    Insulin Glargine Solostar (Lantus SoloStar) 100 UNIT/ML SOPN    liraglutide (Victoza) injection    loperamide (IMODIUM) 2 mg capsule    multivitamin-minerals therapeutic (THERA-M)    NOVOLOG FLEXPEN 100 units/mL SOPN    nystatin (MYCOSTATIN) powder    omeprazole (PriLOSEC) 40 MG capsule    ondansetron (ZOFRAN) 4 mg tablet    potassium chloride (K-DUR,KLOR-CON) 20 mEq tablet    torsemide (DEMADEX) 20 mg tablet       Labs:  Results from last 7 days   Lab Units 09/30/22  0620 09/29/22  0652 09/28/22  0458 09/27/22  0530   WBC Thousand/uL 7 53 7 13 7 33 7 03   HEMOGLOBIN g/dL 9 4* 8 7* 8 2* 8 0*   HEMATOCRIT % 31 0* 28 7* 27 5* 27 0*   PLATELETS Thousands/uL 454* 345 318 232   NEUTROS PCT %  --  67 69 72   LYMPHS PCT %  --  16 16 9*   LYMPHO PCT % 19  --   --   --    MONOS PCT %  --  11 10 13*   MONO PCT % 10  --   --   --    EOS PCT % 3 3 3 4     Results from last 7 days   Lab Units 09/30/22 0620 09/29/22  0652 09/28/22  0458 09/27/22  0530   POTASSIUM mmol/L 4 0 3 7 3 5 3 3*   CHLORIDE mmol/L 98 99 97 97   CO2 mmol/L 28 28 24 28   BUN mg/dL 24 19 38* 26*   CREATININE mg/dL 6 49* 4 79* 7 81* 5 88*   CALCIUM mg/dL 9 6 9 0 8 8 8 8   ALK PHOS U/L 183*  --  145* 141*   ALT U/L 14  --  12 11*   AST U/L 37  --  18 21     Lab Results   Component Value Date    TROPONINI 5 19 (H) 06/27/2020    TROPONINI 5 85 (H) 06/27/2020    TROPONINI 6 35 (H) 06/27/2020    CKMB 4 1 06/25/2020    CKTOTAL 271 06/25/2020    CKTOTAL 53 03/23/2018    CKTOTAL 55 10/08/2017     Results from last 7 days   Lab Units 09/30/22  0620 09/29/22  0652 09/28/22  0458   INR  1 14 1 13 1 10     Lab Results   Component Value Date    BLOODCX No Growth After 4 Days  09/25/2022    BLOODCX No Growth After 4 Days  09/25/2022    BLOODCX No Growth After 5 Days  09/12/2022    BLOODCX No Growth After 5 Days   09/12/2022    URINECX >100,000 cfu/ml Escherichia coli (A) 12/05/2019    WOUNDCULT 4+ Growth of Proteus mirabilis (A) 07/25/2019    WOUNDCULT 4+ Growth of  07/25/2019    WOUNDCULT (A) 07/25/2019     4+ Growth of Methicillin Resistant Staphylococcus aureus    WOUNDCULT 1+ Growth of Proteus mirabilis (A) 07/25/2019    WOUNDCULT 4+ Growth of  07/25/2019    SPUTUMCULTUR 4+ Growth of  07/11/2020    SPUTUMCULTUR 1+ Growth of Aspergillus fumigatus (A) 07/11/2020         Imaging:  Results for orders placed during the hospital encounter of 09/12/22    XR chest portable    Narrative  CHEST    INDICATION:   fever  COMPARISON:  9/12/2022    EXAM PERFORMED/VIEWS:  XR CHEST PORTABLE      FINDINGS:  There is a right-sided dialysis catheter, placed since the previous study  Heart shadow is enlarged but unchanged from prior exam  There is a large hiatal hernia  There is mild bibasilar atelectasis  Evaluation of the left lung bases otherwise limited by enlarged heart and hernia  No pneumothorax or definite pleural effusion  Osseous structures appear within normal limits for patient age  Impression  Limited evaluation of the left base  Mild bibasilar atelectasis, cardiomegaly, and large hiatal hernia  Workstation performed: [id number]    Results for orders placed during the hospital encounter of 10/04/21    XR chest pa & lateral    Narrative  CHEST    INDICATION:   follow up lung consolidations  COMPARISON:  CTA chest/abdomen/pelvis 10/4/2021  Chest radiograph 7/8/2020  EXAM PERFORMED/VIEWS:  XR CHEST PA & LATERAL      FINDINGS:    Heart shadow is enlarged but unchanged from prior exam     Again seen is dense left lower lobe/retrocardiac consolidation with ill-defined airspace opacities in the lingula and right lower lobe, similar to recent chest CT  Trace left pleural effusion  No pneumothorax  Osseous structures appear within normal limits for patient age  Impression  Stable left lower lobe/retrocardiac consolidation and lingular and right lower lobe ill-defined airspace opacities  Trace left pleural effusion          Workstation performed: [id number]      Last 24 Hours Medication List:   Current Facility-Administered Medications   Medication Dose Route Frequency Provider Last Rate    acetaminophen  650 mg Oral Q6H PRN Stephan Ellis MD      albuterol  2 puff Inhalation Q6H PRN Stephan Ellis MD  allopurinol  300 mg Oral Daily Lynne Phillips MD      atorvastatin  40 mg Oral After Sherri Ford MD      b complex-vitamin C-folic acid  1 capsule Oral Daily With Lillie Bruno MD      cholestyramine sugar free  4 g Oral BID Lynne Phillips MD      clotrimazole   Topical BID Lynne Phillips MD      dicyclomine  10 mg Oral 4x Daily [facility]) Lynne Phillips MD      diphenoxylate-atropine  1 tablet Oral Q12H NICOLA Burt      fluticasone-vilanterol  1 puff Inhalation Daily Lynne Phillips MD      insulin lispro  1-6 Units Subcutaneous TID With Meals Lynne Phillips MD      melatonin  6 mg Oral HS Lynne Phillips MD      meropenem  500 mg Intravenous Q24H Lynne Phillips  mg (09/29/22 1437)    midodrine  10 mg Oral TID AC Lynne Phillips MD      ondansetron  4 mg Intravenous Q6H PRN Lynne Phillpis MD      pantoprazole  40 mg Oral Early Morning Madelin Wilson MD      trimethobenzamide  200 mg Intramuscular Q6H PRN Lynne Phillips MD      vancomycin  125 mg Oral BID Lynne Phillips MD          Today, Patient Was Seen By: Renée Chan    ** Please Note: Dictation voice to text software may have been used in the creation of this document   **

## 2022-09-30 NOTE — PLAN OF CARE
Problem: Potential for Falls  Goal: Patient will remain free of falls  Description: INTERVENTIONS:  - Educate patient/family on patient safety including physical limitations  - Instruct patient to call for assistance with activity   - Consult OT/PT to assist with strengthening/mobility   - Keep Call bell within reach  - Keep bed low and locked with side rails adjusted as appropriate  - Keep care items and personal belongings within reach  - Initiate and maintain comfort rounds  - Make Fall Risk Sign visible to staff  - Offer Toileting every 2 Hours, in advance of need  - Initiate/Maintain bed alarm  - Obtain necessary fall risk management equipment:   - Apply yellow socks and bracelet for high fall risk patients  - Consider moving patient to room near nurses station  Outcome: Progressing     Problem: MOBILITY - ADULT  Goal: Maintain or return to baseline ADL function  Description: INTERVENTIONS:  - Educate patient/family on patient safety including physical limitations  - Instruct patient to call for assistance with activity   - Consult OT/PT to assist with strengthening/mobility   - Keep Call bell within reach  - Keep bed low and locked with side rails adjusted as appropriate  - Keep care items and personal belongings within reach  - Initiate and maintain comfort rounds  - Make Fall Risk Sign visible to staff  - Offer Toileting every 2 Hours, in advance of need  - Initiate/Maintain bed alarm  - Obtain necessary fall risk management equipment:   - Apply yellow socks and bracelet for high fall risk patients  - Consider moving patient to room near nurses station  Outcome: Progressing     Problem: Prexisting or High Potential for Compromised Skin Integrity  Goal: Skin integrity is maintained or improved  Description: INTERVENTIONS:  - Identify patients at risk for skin breakdown  - Assess and monitor skin integrity  - Assess and monitor nutrition and hydration status  - Monitor labs   - Assess for incontinence   - Turn and reposition patient  - Assist with mobility/ambulation  - Relieve pressure over bony prominences  - Avoid friction and shearing  - Provide appropriate hygiene as needed including keeping skin clean and dry  - Evaluate need for skin moisturizer/barrier cream  - Collaborate with interdisciplinary team   - Patient/family teaching  - Consider wound care consult   Outcome: Progressing     Problem: Nutrition/Hydration-ADULT  Goal: Nutrient/Hydration intake appropriate for improving, restoring or maintaining nutritional needs  Description: Monitor and assess patient's nutrition/hydration status for malnutrition  Collaborate with interdisciplinary team and initiate plan and interventions as ordered  Monitor patient's weight and dietary intake as ordered or per policy  Utilize nutrition screening tool and intervene as necessary  Determine patient's food preferences and provide high-protein, high-caloric foods as appropriate       INTERVENTIONS:  - Monitor oral intake, urinary output, labs, and treatment plans  - Assess nutrition and hydration status and recommend course of action  - Evaluate amount of meals eaten  - Assist patient with eating if necessary   - Allow adequate time for meals  - Recommend/ encourage appropriate diets, oral nutritional supplements, and vitamin/mineral supplements  - Order, calculate, and assess calorie counts as needed  - Recommend, monitor, and adjust tube feedings and TPN/PPN based on assessed needs  - Assess need for intravenous fluids  - Provide specific nutrition/hydration education as appropriate  - Include patient/family/caregiver in decisions related to nutrition  Outcome: Progressing     Problem: GASTROINTESTINAL - ADULT  Goal: Minimal or absence of nausea and/or vomiting  Description: INTERVENTIONS:  - Administer IV fluids if ordered to ensure adequate hydration  - Maintain NPO status until nausea and vomiting are resolved  - Nasogastric tube if ordered  - Administer ordered antiemetic medications as needed  - Provide nonpharmacologic comfort measures as appropriate  - Advance diet as tolerated, if ordered  - Consider nutrition services referral to assist patient with adequate nutrition and appropriate food choices  Outcome: Progressing  Goal: Maintains or returns to baseline bowel function  Description: INTERVENTIONS:  - Assess bowel function  - Encourage oral fluids to ensure adequate hydration  - Administer IV fluids if ordered to ensure adequate hydration  - Administer ordered medications as needed  - Encourage mobilization and activity  - Consider nutritional services referral to assist patient with adequate nutrition and appropriate food choices  Outcome: Progressing  Goal: Maintains adequate nutritional intake  Description: INTERVENTIONS:  - Monitor percentage of each meal consumed  - Identify factors contributing to decreased intake, treat as appropriate  - Assist with meals as needed  - Monitor I&O, weight, and lab values if indicated  - Obtain nutrition services referral as needed  Outcome: Progressing     Problem: GENITOURINARY - ADULT  Goal: Maintains or returns to baseline urinary function  Description: INTERVENTIONS:  - Assess urinary function  - Encourage oral fluids to ensure adequate hydration if ordered  - Administer IV fluids as ordered to ensure adequate hydration  - Administer ordered medications as needed  - Offer frequent toileting  - Follow urinary retention protocol if ordered  Outcome: Progressing  Goal: Absence of urinary retention  Description: INTERVENTIONS:  - Assess patients ability to void and empty bladder  - Monitor I/O  - Bladder scan as needed  - Discuss with physician/AP medications to alleviate retention as needed  - Discuss catheterization for long term situations as appropriate  Outcome: Progressing  Goal: Urinary catheter remains patent  Description: INTERVENTIONS:  - Assess patency of urinary catheter  - If patient has a chronic mehta, consider changing catheter if non-functioning  - Follow guidelines for intermittent irrigation of non-functioning urinary catheter  Outcome: Progressing     Problem: METABOLIC, FLUID AND ELECTROLYTES - ADULT  Goal: Electrolytes maintained within normal limits  Description: INTERVENTIONS:  - Monitor labs and assess patient for signs and symptoms of electrolyte imbalances  - Administer electrolyte replacement as ordered  - Monitor response to electrolyte replacements, including repeat lab results as appropriate  - Instruct patient on fluid and nutrition as appropriate  Outcome: Progressing  Goal: Fluid balance maintained  Description: INTERVENTIONS:  - Monitor labs   - Monitor I/O and WT  - Instruct patient on fluid and nutrition as appropriate  - Assess for signs & symptoms of volume excess or deficit  Outcome: Progressing     Problem: PAIN - ADULT  Goal: Verbalizes/displays adequate comfort level or baseline comfort level  Description: Interventions:  - Encourage patient to monitor pain and request assistance  - Assess pain using appropriate pain scale  - Administer analgesics based on type and severity of pain and evaluate response  - Implement non-pharmacological measures as appropriate and evaluate response  - Consider cultural and social influences on pain and pain management  - Notify physician/advanced practitioner if interventions unsuccessful or patient reports new pain  Outcome: Progressing     Problem: INFECTION - ADULT  Goal: Absence or prevention of progression during hospitalization  Description: INTERVENTIONS:  - Assess and monitor for signs and symptoms of infection  - Monitor lab/diagnostic results  - Monitor all insertion sites, i e  indwelling lines, tubes, and drains  - Monitor endotracheal if appropriate and nasal secretions for changes in amount and color  - Curtis appropriate cooling/warming therapies per order  - Administer medications as ordered  - Instruct and encourage patient and family to use good hand hygiene technique  - Identify and instruct in appropriate isolation precautions for identified infection/condition  Outcome: Progressing  Goal: Absence of fever/infection during neutropenic period  Description: INTERVENTIONS:  - Monitor WBC    Outcome: Progressing     Problem: SAFETY ADULT  Goal: Patient will remain free of falls  Description: INTERVENTIONS:  - Educate patient/family on patient safety including physical limitations  - Instruct patient to call for assistance with activity   - Consult OT/PT to assist with strengthening/mobility   - Keep Call bell within reach  - Keep bed low and locked with side rails adjusted as appropriate  - Keep care items and personal belongings within reach  - Initiate and maintain comfort rounds  - Make Fall Risk Sign visible to staff  - Offer Toileting every 2 Hours, in advance of need  - Initiate/Maintain bed alarm  - Obtain necessary fall risk management equipment:   - Apply yellow socks and bracelet for high fall risk patients  - Consider moving patient to room near nurses station  Outcome: Progressing  Goal: Maintain or return to baseline ADL function  Description: INTERVENTIONS:  - Educate patient/family on patient safety including physical limitations  - Instruct patient to call for assistance with activity   - Consult OT/PT to assist with strengthening/mobility   - Keep Call bell within reach  - Keep bed low and locked with side rails adjusted as appropriate  - Keep care items and personal belongings within reach  - Initiate and maintain comfort rounds  - Make Fall Risk Sign visible to staff  - Offer Toileting every 2 Hours, in advance of need  - Initiate/Maintain bed alarm  - Obtain necessary fall risk management equipment:   - Apply yellow socks and bracelet for high fall risk patients  - Consider moving patient to room near nurses station  Outcome: Progressing  Goal: Maintains/Returns to pre admission functional level  Description: INTERVENTIONS:  -  Assess patient's ability to carry out ADLs; assess patient's baseline for ADL function and identify physical deficits which impact ability to perform ADLs (bathing, care of mouth/teeth, toileting, grooming, dressing, etc )  - Assess/evaluate cause of self-care deficits   - Assess range of motion  - Assess patient's mobility; develop plan if impaired  - Assess patient's need for assistive devices and provide as appropriate  - Encourage maximum independence but intervene and supervise when necessary  - Involve family in performance of ADLs  - Assess for home care needs following discharge   - Consider OT consult to assist with ADL evaluation and planning for discharge  - Provide patient education as appropriate  Outcome: Progressing     Problem: DISCHARGE PLANNING  Goal: Discharge to home or other facility with appropriate resources  Description: INTERVENTIONS:  - Identify barriers to discharge w/patient and caregiver  - Arrange for needed discharge resources and transportation as appropriate  - Identify discharge learning needs (meds, wound care, etc )  - Arrange for interpretive services to assist at discharge as needed  - Refer to Case Management Department for coordinating discharge planning if the patient needs post-hospital services based on physician/advanced practitioner order or complex needs related to functional status, cognitive ability, or social support system  Outcome: Progressing     Problem: Knowledge Deficit  Goal: Patient/family/caregiver demonstrates understanding of disease process, treatment plan, medications, and discharge instructions  Description: Complete learning assessment and assess knowledge base    Interventions:  - Provide teaching at level of understanding  - Provide teaching via preferred learning methods  Outcome: Progressing

## 2022-09-30 NOTE — PLAN OF CARE
Problem: Potential for Falls  Goal: Patient will remain free of falls  Description: INTERVENTIONS:  - Educate patient/family on patient safety including physical limitations  - Instruct patient to call for assistance with activity   - Consult OT/PT to assist with strengthening/mobility   - Keep Call bell within reach  - Keep bed low and locked with side rails adjusted as appropriate  - Keep care items and personal belongings within reach  - Initiate and maintain comfort rounds  - Make Fall Risk Sign visible to staff  - Offer Toileting every 2 Hours, in advance of need  - Initiate/Maintain bed alarm  - Obtain necessary fall risk management equipment:   - Apply yellow socks and bracelet for high fall risk patients  - Consider moving patient to room near nurses station  9/30/2022 1248 by Manny Acosta RN  Outcome: Progressing  9/30/2022 1247 by Manny Acosta RN  Outcome: Progressing     Problem: MOBILITY - ADULT  Goal: Maintain or return to baseline ADL function  Description: INTERVENTIONS:  -  Assess patient's ability to carry out ADLs; assess patient's baseline for ADL function and identify physical deficits which impact ability to perform ADLs (bathing, care of mouth/teeth, toileting, grooming, dressing, etc )  - Assess/evaluate cause of self-care deficits   - Assess range of motion  - Assess patient's mobility; develop plan if impaired  - Assess patient's need for assistive devices and provide as appropriate  - Encourage maximum independence but intervene and supervise when necessary  - Involve family in performance of ADLs  - Assess for home care needs following discharge   - Consider OT consult to assist with ADL evaluation and planning for discharge  - Provide patient education as appropriate  9/30/2022 1248 by Manny Acosta RN  Outcome: Progressing  9/30/2022 1247 by Manny Acosta RN  Outcome: Progressing     Problem: Prexisting or High Potential for Compromised Skin Integrity  Goal: Skin integrity is maintained or improved  Description: INTERVENTIONS:  - Identify patients at risk for skin breakdown  - Assess and monitor skin integrity  - Assess and monitor nutrition and hydration status  - Monitor labs   - Assess for incontinence   - Turn and reposition patient  - Assist with mobility/ambulation  - Relieve pressure over bony prominences  - Avoid friction and shearing  - Provide appropriate hygiene as needed including keeping skin clean and dry  - Evaluate need for skin moisturizer/barrier cream  - Collaborate with interdisciplinary team   - Patient/family teaching  - Consider wound care consult   9/30/2022 1248 by Lashanda Vincent RN  Outcome: Progressing  9/30/2022 1247 by Lashanda Vincent RN  Outcome: Progressing     Problem: Nutrition/Hydration-ADULT  Goal: Nutrient/Hydration intake appropriate for improving, restoring or maintaining nutritional needs  Description: Monitor and assess patient's nutrition/hydration status for malnutrition  Collaborate with interdisciplinary team and initiate plan and interventions as ordered  Monitor patient's weight and dietary intake as ordered or per policy  Utilize nutrition screening tool and intervene as necessary  Determine patient's food preferences and provide high-protein, high-caloric foods as appropriate       INTERVENTIONS:  - Monitor oral intake, urinary output, labs, and treatment plans  - Assess nutrition and hydration status and recommend course of action  - Evaluate amount of meals eaten  - Assist patient with eating if necessary   - Allow adequate time for meals  - Recommend/ encourage appropriate diets, oral nutritional supplements, and vitamin/mineral supplements  - Order, calculate, and assess calorie counts as needed  - Recommend, monitor, and adjust tube feedings and TPN/PPN based on assessed needs  - Assess need for intravenous fluids  - Provide specific nutrition/hydration education as appropriate  - Include patient/family/caregiver in decisions related to nutrition  9/30/2022 1248 by Sidney Kruger RN  Outcome: Progressing  9/30/2022 1247 by Sidney Kruger RN  Outcome: Progressing     Problem: GASTROINTESTINAL - ADULT  Goal: Minimal or absence of nausea and/or vomiting  Description: INTERVENTIONS:  - Administer IV fluids if ordered to ensure adequate hydration  - Maintain NPO status until nausea and vomiting are resolved  - Nasogastric tube if ordered  - Administer ordered antiemetic medications as needed  - Provide nonpharmacologic comfort measures as appropriate  - Advance diet as tolerated, if ordered  - Consider nutrition services referral to assist patient with adequate nutrition and appropriate food choices  9/30/2022 1248 by Sidney Kruger RN  Outcome: Progressing  9/30/2022 1247 by Sidney Kruger RN  Outcome: Progressing  Goal: Maintains or returns to baseline bowel function  Description: INTERVENTIONS:  - Assess bowel function  - Encourage oral fluids to ensure adequate hydration  - Administer IV fluids if ordered to ensure adequate hydration  - Administer ordered medications as needed  - Encourage mobilization and activity  - Consider nutritional services referral to assist patient with adequate nutrition and appropriate food choices  9/30/2022 1248 by Sidney Kruger RN  Outcome: Progressing  9/30/2022 1247 by Sidney Kruger RN  Outcome: Progressing  Goal: Maintains adequate nutritional intake  Description: INTERVENTIONS:  - Monitor percentage of each meal consumed  - Identify factors contributing to decreased intake, treat as appropriate  - Assist with meals as needed  - Monitor I&O, weight, and lab values if indicated  - Obtain nutrition services referral as needed  9/30/2022 1248 by Sidney Kruger RN  Outcome: Progressing  9/30/2022 1247 by Sidney Kruger RN  Outcome: Progressing     Problem: GENITOURINARY - ADULT  Goal: Maintains or returns to baseline urinary function  Description: INTERVENTIONS:  - Assess urinary function  - Encourage oral fluids to ensure adequate hydration if ordered  - Administer IV fluids as ordered to ensure adequate hydration  - Administer ordered medications as needed  - Offer frequent toileting  - Follow urinary retention protocol if ordered  9/30/2022 1248 by Louis Padron RN  Outcome: Progressing  9/30/2022 1247 by Louis Padron RN  Outcome: Progressing  Goal: Absence of urinary retention  Description: INTERVENTIONS:  - Assess patients ability to void and empty bladder  - Monitor I/O  - Bladder scan as needed  - Discuss with physician/AP medications to alleviate retention as needed  - Discuss catheterization for long term situations as appropriate  9/30/2022 1248 by Louis Padron RN  Outcome: Progressing  9/30/2022 1247 by Louis Padron RN  Outcome: Progressing  Goal: Urinary catheter remains patent  Description: INTERVENTIONS:  - Assess patency of urinary catheter  - If patient has a chronic mehta, consider changing catheter if non-functioning  - Follow guidelines for intermittent irrigation of non-functioning urinary catheter  9/30/2022 1248 by Louis Padron RN  Outcome: Progressing  9/30/2022 1247 by Loius Padron RN  Outcome: Progressing     Problem: METABOLIC, FLUID AND ELECTROLYTES - ADULT  Goal: Electrolytes maintained within normal limits  Description: INTERVENTIONS:  - Monitor labs and assess patient for signs and symptoms of electrolyte imbalances  - Administer electrolyte replacement as ordered  - Monitor response to electrolyte replacements, including repeat lab results as appropriate  - Instruct patient on fluid and nutrition as appropriate  9/30/2022 1248 by Louis Padron RN  Outcome: Progressing  9/30/2022 1247 by Louis Padron RN  Outcome: Progressing  Goal: Fluid balance maintained  Description: INTERVENTIONS:  - Monitor labs   - Monitor I/O and WT  - Instruct patient on fluid and nutrition as appropriate  - Assess for signs & symptoms of volume excess or deficit  9/30/2022 1248 by Lemuel Billingsley RN  Outcome: Progressing  9/30/2022 1247 by Lemuel Billingsley RN  Outcome: Progressing     Problem: PAIN - ADULT  Goal: Verbalizes/displays adequate comfort level or baseline comfort level  Description: Interventions:  - Encourage patient to monitor pain and request assistance  - Assess pain using appropriate pain scale  - Administer analgesics based on type and severity of pain and evaluate response  - Implement non-pharmacological measures as appropriate and evaluate response  - Consider cultural and social influences on pain and pain management  - Notify physician/advanced practitioner if interventions unsuccessful or patient reports new pain  9/30/2022 1248 by Lemuel Billingsley RN  Outcome: Progressing  9/30/2022 1247 by Lemuel Billingsley RN  Outcome: Progressing     Problem: INFECTION - ADULT  Goal: Absence or prevention of progression during hospitalization  Description: INTERVENTIONS:  - Assess and monitor for signs and symptoms of infection  - Monitor lab/diagnostic results  - Monitor all insertion sites, i e  indwelling lines, tubes, and drains  - Monitor endotracheal if appropriate and nasal secretions for changes in amount and color  - [place] appropriate cooling/warming therapies per order  - Administer medications as ordered  - Instruct and encourage patient and family to use good hand hygiene technique  - Identify and instruct in appropriate isolation precautions for identified infection/condition  9/30/2022 1248 by Lemuel Billingsley RN  Outcome: Progressing  9/30/2022 1247 by Lemuel Billingsley RN  Outcome: Progressing  Goal: Absence of fever/infection during neutropenic period  Description: INTERVENTIONS:  - Monitor WBC    9/30/2022 1248 by Lemuel Billingsley RN  Outcome: Progressing  9/30/2022 1247 by Lemuel Billingsley RN  Outcome: Progressing     Problem: SAFETY ADULT  Goal: Patient will remain free of falls  Description: INTERVENTIONS:  - Educate patient/family on patient safety including physical limitations  - Instruct patient to call for assistance with activity   - Consult OT/PT to assist with strengthening/mobility   - Keep Call bell within reach  - Keep bed low and locked with side rails adjusted as appropriate  - Keep care items and personal belongings within reach  - Initiate and maintain comfort rounds  - Make Fall Risk Sign visible to staff  - Offer Toileting every 2 Hours, in advance of need  - Initiate/Maintain bed alarm  - Obtain necessary fall risk management equipment:   - Apply yellow socks and bracelet for high fall risk patients  - Consider moving patient to room near nurses station  9/30/2022 1248 by Regino Harvey RN  Outcome: Progressing  9/30/2022 1247 by Regino Harvey RN  Outcome: Progressing  Goal: Maintain or return to baseline ADL function  Description: INTERVENTIONS:  -  Assess patient's ability to carry out ADLs; assess patient's baseline for ADL function and identify physical deficits which impact ability to perform ADLs (bathing, care of mouth/teeth, toileting, grooming, dressing, etc )  - Assess/evaluate cause of self-care deficits   - Assess range of motion  - Assess patient's mobility; develop plan if impaired  - Assess patient's need for assistive devices and provide as appropriate  - Encourage maximum independence but intervene and supervise when necessary  - Involve family in performance of ADLs  - Assess for home care needs following discharge   - Consider OT consult to assist with ADL evaluation and planning for discharge  - Provide patient education as appropriate  9/30/2022 1248 by Regino Harvey RN  Outcome: Progressing  9/30/2022 1247 by Regino Harvey RN  Outcome: Progressing  Goal: Maintains/Returns to pre admission functional level  Description: INTERVENTIONS:  - Perform BMAT or MOVE assessment daily    - Set and communicate daily mobility goal to care team and patient/family/caregiver  - Collaborate with rehabilitation services on mobility goals if consulted  - Perform Range of Motion 2 times a day  - Reposition patient every 22 hours  - Out of bed for toileting  - Record patient progress and toleration of activity level   9/30/2022 1248 by Lisset Leblanc RN  Outcome: Progressing  9/30/2022 1247 by Lisset Leblanc RN  Outcome: Progressing     Problem: DISCHARGE PLANNING  Goal: Discharge to home or other facility with appropriate resources  Description: INTERVENTIONS:  - Identify barriers to discharge w/patient and caregiver  - Arrange for needed discharge resources and transportation as appropriate  - Identify discharge learning needs (meds, wound care, etc )  - Arrange for interpretive services to assist at discharge as needed  - Refer to Case Management Department for coordinating discharge planning if the patient needs post-hospital services based on physician/advanced practitioner order or complex needs related to functional status, cognitive ability, or social support system  9/30/2022 1248 by Lisset Leblanc RN  Outcome: Progressing  9/30/2022 1247 by Lisset Leblanc RN  Outcome: Progressing     Problem: Knowledge Deficit  Goal: Patient/family/caregiver demonstrates understanding of disease process, treatment plan, medications, and discharge instructions  Description: Complete learning assessment and assess knowledge base    Interventions:  - Provide teaching at level of understanding  - Provide teaching via preferred learning methods  9/30/2022 1248 by Lisset Leblanc RN  Outcome: Progressing  9/30/2022 1247 by Lisset Leblanc RN  Outcome: Progressing

## 2022-09-30 NOTE — ASSESSMENT & PLAN NOTE
· 9/18 Bradycardia with Junctional escape beats 20's overnight with hypotension  · No rate control meds d/c in august due to bradycardia  · TSH 0 4  · Cards consulted recommending CPAP HS and occurs with apnea episodes   · Patient is scheduled for cholecystomy tube placement today  · Will restart Coumadin 5 mg daily tonight  · Trend PT INR

## 2022-09-30 NOTE — TELEPHONE ENCOUNTER
Call to confirm appoitment for 10/3 in [facility] but  the number on file was for [facility] were the patient is staying spoke to [facility] unit clerk and ask to cancel appointment  due to the fact that the patient is still in the hospital we call to reschedule when patient is out of the hospital

## 2022-09-30 NOTE — PROGRESS NOTES
Progress note - Gastroenterology   Charly Bal 81 y o  male MRN: [id number]  Unit/Bed#: -01 Encounter: [id number]    ASSESSMENT and PLAN  1  Diverticulitis -resolved  Admitted with abdominal pain, CT scan consistent with diverticulitis  Treated with Zosyn times 10 days     Repeat CT scan 9/24 showed resolution of sigmoid diverticulitis     2  Acute cholecystitis  Gallbladder distended with wall thickening and alaina cholecystic inflammatory changes, cystic duct calculi on CT scan  Denies abdominal pain but tender with palpation all quadrants  HIDA scan 9/28 shows nonvisualization of gallbladder consistent with acute cholecystitis  Cholecystostomy tube placed 9/29-draining bloody fluid, cultures pending  -Surgery following and Infectious Disease following  -Continue IV antibiotics per ID     2  Diarrhea  Developed diarrhea, stool negative for C diff  Likely multifactorial related to antibiotic use and current chemotherapy for multiple myeloma  Continues to have liquid stool via if fecal management system but appears to be decreasing-350 cc recorded over past 24 hours  -continue Questran b i d   -continue Lomotil q 12 hours-can increase to t i d  If diarrhea continued  -on vancomycin prophylaxis due to continued antibiotics     3  Nausea/vomiting  Repeat EGD  9/26 showed small gastric polyp which was excised, large sliding hiatal hernia without Demian lesions, stomach dilated with large amount of retained liquid, concern for gastroparesis  No further nausea or vomiting but his appetite has been poor  -advanced diet, Ensure supplement  -Reglan discontinued 9/29 due to hallucinations and confusion  -continue pantoprazole 40 mg b i d   -consider gastric emptying study after gallbladder decompressed    Chief Complaint   Patient presents with    Abnormal Lab     Patient arrives via EMS from [facility] for an elevated creatinine of 7 3  Reports yellow emesis for a few days   Patient has RUQ and RLQ abdominal pain, is currently getting chemo once a week for kidney cancer  SUBJECTIVE/HPI   Patient denies abdominal pain but continues with diffuse tenderness with palpation all quadrants  Cholecystostomy tube placed yesterday 9/29  Draining small amount bloody liquid  T-max 98 0 9  WBCs normal today at 7 5, hemoglobin 9 4      /52   Pulse 68   Temp 98 3 °F (36 8 °C)   Resp 18   Ht 6' 3" (1 905 m)   Wt 121 kg (266 lb 1 5 oz)   SpO2 96%   BMI 33 26 kg/m²     PHYSICALEXAM  General appearance:  No acute distress  Eyes: PERLLA, EOMI, no icterus   Head: Normocephalic, without obvious abnormality, atraumatic  Lungs: clear to auscultation bilaterally  Heart: regular rate and rhythm, S1, S2 normal, no murmur, click, rub or gallop  Abdomen: soft, obese, tender with palpation all quadrants  Right abdominal cholecystostomy tube in place, draining bloody fluid  Fecal management system in place-continues to have liquid stool but decreasing amounts    350 cc stool recorded over past 24 hours  Extremities: extremities normal, atraumatic, no cyanosis or edema  Neurologic: Grossly normal    Lab Results   Component Value Date    GLUCOSE 64 (L) 08/01/2022    CALCIUM 9 6 09/30/2022     01/15/2018    K 4 0 09/30/2022    CO2 28 09/30/2022    CL 98 09/30/2022    BUN 24 09/30/2022    CREATININE 6 49 (H) 09/30/2022     Lab Results   Component Value Date    WBC 7 53 09/30/2022    HGB 9 4 (L) 09/30/2022    HCT 31 0 (L) 09/30/2022    MCV 87 09/30/2022     (H) 09/30/2022     Lab Results   Component Value Date    ALT 14 09/30/2022    AST 37 09/30/2022     (H) 11/03/2019    ALKPHOS 183 (H) 09/30/2022    BILITOT 0 6 01/15/2018       Lab Results   Component Value Date    LIPASE 691 (H) 09/13/2022     Lab Results   Component Value Date    IRON 17 (L) 09/23/2022    TIBC 174 (L) 09/23/2022    FERRITIN 479 (H) 09/23/2022     Lab Results   Component Value Date    INR 1 14 09/30/2022

## 2022-09-30 NOTE — ASSESSMENT & PLAN NOTE
Lab Results   Component Value Date    HGBA1C 6 3 (H) 08/05/2022       Recent Labs     09/29/22  1122 09/29/22  1557 09/29/22  2216 09/30/22  0708   POCGLU 143* 169* 139 136       Blood Sugar Average: Last 72 hrs:  (P) 581 [id number]   · Hold lantus  · SSI

## 2022-09-30 NOTE — ASSESSMENT & PLAN NOTE
· 2nd episode in one month (tx with 10 days antibx 8/2-8/11)  · CTAP acute diverticulitis  · GI following  · 7/25 Colonoscopy--2 polyps removed, Mild diverticula in the descending colon and sigmoid colon, Small, internal hemorrhoids  · 9/22 finished 10 day course of zosyn  · Questran started 9/17   · Imodium PRN  · Bentyl added 9/18 by GI  · advance diet per gi recommendations  · Gi suspects  multifactorial probably were related to the antibiotics that he is receiving for the diverticulitis as well as a chemo effect from his myeloma  May need to hold chemo on discharge for a period of time  Treatment at this point is supportive  · Monitor rectal tube output, Stool appears much darker today  · After discontinuation of Zosyn the following day on 09/23 patient started to develop leukocytosis with elevated procalcitonin  · Consulted Infectious Disease  · C diff is negative  · 9/24 Started patient on p o  Vancomycin and Zosyn  · ID switched patient to [address] [place] [address]    Continue Merrem and oral vancomycin

## 2022-09-30 NOTE — PLAN OF CARE
Post-Dialysis RN Treatment Note    Blood Pressure:  Pre 129/57 mm/Hg  Post 110/71 mmHg   EDW  tbd    Weight:  Pre 117 8 kg   Post 117 1 kg   Mode of weight measurement: Bed Scale   Volume Removed  1000 ml    Treatment duration 240 minutes    NS given  No    Treatment shortened? No   Medications given during Rx None Reported   Estimated Kt/V  Not Applicable   Access type: Permacath/TDC   Access Issues: Yes, describe: lines reversed for improved flow, able to maintain bfr 400    Report called to primary nurse   Yes Carlin Bryan RN    HD tx plan: 4 hrs removing 1L as discussed with Cheyenne PETERSEN pre dialysis  3K bath for serum K 4 0 9/30  Using R permacath for hd      Problem: METABOLIC, FLUID AND ELECTROLYTES - ADULT  Goal: Electrolytes maintained within normal limits  Description: INTERVENTIONS:  - Monitor labs and assess patient for signs and symptoms of electrolyte imbalances  - Administer electrolyte replacement as ordered  - Monitor response to electrolyte replacements, including repeat lab results as appropriate  - Instruct patient on fluid and nutrition as appropriate  Outcome: Progressing     Problem: METABOLIC, FLUID AND ELECTROLYTES - ADULT  Goal: Fluid balance maintained  Description: INTERVENTIONS:  - Monitor labs   - Monitor I/O and WT  - Instruct patient on fluid and nutrition as appropriate  - Assess for signs & symptoms of volume excess or deficit  Outcome: Progressing

## 2022-09-30 NOTE — CASE MANAGEMENT
Case Management Progress Note    Patient name Tanesha Rosario  Location /-82 MRN [id number]  : 1950 Date 2022       LOS (days): 18  Geometric Mean LOS (GMLOS) (days): 5 90  Days to GMLOS:-11 9        OBJECTIVE:        Current admission status: Inpatient  Preferred Pharmacy:   [facility], [place]  [address] [facility] [address]  Phone: [phone] Fax: [phone]    Primary Care Provider: Loki Cotto MD    Primary Insurance: Ilda Griffith [facility]  Secondary Insurance:     PROGRESS NOTE:    Met with pt to discuss dc plan  Pt is aware and agreeable to plan for dc to [facility] and HD at [facility]

## 2022-10-01 LAB
BACTERIA BLD CULT: NORMAL
BACTERIA BLD CULT: NORMAL
GLUCOSE SERPL-MCNC: 115 MG/DL (ref 65–140)
GLUCOSE SERPL-MCNC: 132 MG/DL (ref 65–140)
GLUCOSE SERPL-MCNC: 135 MG/DL (ref 65–140)
GLUCOSE SERPL-MCNC: 164 MG/DL (ref 65–140)
INR PPP: 1.23 (ref 0.84–1.19)
PROTHROMBIN TIME: 16.4 SECONDS (ref 11.6–14.5)

## 2022-10-01 PROCEDURE — 99232 SBSQ HOSP IP/OBS MODERATE 35: CPT | Performed by: INTERNAL MEDICINE

## 2022-10-01 PROCEDURE — 82948 REAGENT STRIP/BLOOD GLUCOSE: CPT

## 2022-10-01 PROCEDURE — 85610 PROTHROMBIN TIME: CPT | Performed by: INTERNAL MEDICINE

## 2022-10-01 RX ADMIN — MEROPENEM 500 MG: 500 INJECTION, POWDER, FOR SOLUTION INTRAVENOUS at 14:24

## 2022-10-01 RX ADMIN — MIDODRINE HYDROCHLORIDE 10 MG: 5 TABLET ORAL at 12:04

## 2022-10-01 RX ADMIN — MIDODRINE HYDROCHLORIDE 10 MG: 5 TABLET ORAL at 17:17

## 2022-10-01 RX ADMIN — DICYCLOMINE HYDROCHLORIDE 10 MG: 10 CAPSULE ORAL at 22:13

## 2022-10-01 RX ADMIN — NEPHROCAP 1 CAPSULE: 1 CAP ORAL at 17:17

## 2022-10-01 RX ADMIN — CHOLESTYRAMINE 4 G: 4 POWDER, FOR SUSPENSION ORAL at 22:13

## 2022-10-01 RX ADMIN — INSULIN LISPRO 1 UNITS: 100 INJECTION, SOLUTION INTRAVENOUS; SUBCUTANEOUS at 09:03

## 2022-10-01 RX ADMIN — WARFARIN SODIUM 5 MG: 5 TABLET ORAL at 17:17

## 2022-10-01 RX ADMIN — DIPHENOXYLATE HYDROCHLORIDE AND ATROPINE SULFATE 1 TABLET: 2.5; .025 TABLET ORAL at 22:13

## 2022-10-01 RX ADMIN — CHOLESTYRAMINE 4 G: 4 POWDER, FOR SUSPENSION ORAL at 06:52

## 2022-10-01 RX ADMIN — DICYCLOMINE HYDROCHLORIDE 10 MG: 10 CAPSULE ORAL at 06:51

## 2022-10-01 RX ADMIN — CLOTRIMAZOLE: 0.01 CREAM TOPICAL at 09:02

## 2022-10-01 RX ADMIN — ATORVASTATIN CALCIUM 40 MG: 40 TABLET, FILM COATED ORAL at 17:17

## 2022-10-01 RX ADMIN — FLUTICASONE FUROATE AND VILANTEROL TRIFENATATE 1 PUFF: 200; 25 POWDER RESPIRATORY (INHALATION) at 09:03

## 2022-10-01 RX ADMIN — PANTOPRAZOLE SODIUM 40 MG: 40 TABLET, DELAYED RELEASE ORAL at 06:51

## 2022-10-01 RX ADMIN — VANCOMYCIN HYDROCHLORIDE 125 MG: 125 CAPSULE ORAL at 17:17

## 2022-10-01 RX ADMIN — ALLOPURINOL 300 MG: 300 TABLET ORAL at 09:02

## 2022-10-01 RX ADMIN — MIDODRINE HYDROCHLORIDE 10 MG: 5 TABLET ORAL at 06:52

## 2022-10-01 RX ADMIN — Medication 6 MG: at 22:13

## 2022-10-01 RX ADMIN — DIPHENOXYLATE HYDROCHLORIDE AND ATROPINE SULFATE 1 TABLET: 2.5; .025 TABLET ORAL at 12:04

## 2022-10-01 RX ADMIN — DICYCLOMINE HYDROCHLORIDE 10 MG: 10 CAPSULE ORAL at 17:17

## 2022-10-01 RX ADMIN — VANCOMYCIN HYDROCHLORIDE 125 MG: 125 CAPSULE ORAL at 09:02

## 2022-10-01 RX ADMIN — DICYCLOMINE HYDROCHLORIDE 10 MG: 10 CAPSULE ORAL at 12:04

## 2022-10-01 NOTE — ASSESSMENT & PLAN NOTE
Wt Readings from Last 3 Encounters:   10/01/22 116 kg (255 lb 4 7 oz)   09/06/22 117 kg (257 lb 15 oz)   08/30/22 118 kg (260 lb)     · EF 45% decreased RV fx  · I/O  · Daily weight  · Currently being managed with HD  · Demadex discontinued due to low bp and minimal urine output

## 2022-10-01 NOTE — ASSESSMENT & PLAN NOTE
· 2nd episode in one month (tx with 10 days antibx 8/2-8/11)  · CTAP acute diverticulitis  · GI following  · 7/25 Colonoscopy--2 polyps removed, Mild diverticula in the descending colon and sigmoid colon, Small, internal hemorrhoids  · 9/22 finished 10 day course of zosyn  · Questran started 9/17   · Imodium PRN  · Bentyl added 9/18 by GI  · advance diet per gi recommendations  · Gi suspects  multifactorial probably were related to the antibiotics that he is receiving for the diverticulitis as well as a chemo effect from his myeloma  May need to hold chemo on discharge for a period of time  Treatment at this point is supportive  · Monitor rectal tube output, Stool appears much darker today  · After discontinuation of Zosyn the following day on 09/23 patient started to develop leukocytosis with elevated procalcitonin  · Consulted Infectious Disease  · C diff is negative  · 9/24 Started patient on p o  Vancomycin and Zosyn  · ID switched patient to [address] [place] [address]    Continue Merrem and oral vancomycin

## 2022-10-01 NOTE — PLAN OF CARE
Problem: Potential for Falls  Goal: Patient will remain free of falls  Description: INTERVENTIONS:  - Educate patient/family on patient safety including physical limitations  - Instruct patient to call for assistance with activity   - Consult OT/PT to assist with strengthening/mobility   - Keep Call bell within reach  - Keep bed low and locked with side rails adjusted as appropriate  - Keep care items and personal belongings within reach  - Initiate and maintain comfort rounds  - Make Fall Risk Sign visible to staff  - Offer Toileting every 2 Hours, in advance of need  - Initiate/Maintain bed alarm  - Obtain necessary fall risk management equipment:   - Apply yellow socks and bracelet for high fall risk patients  - Consider moving patient to room near nurses station  Outcome: Progressing     Problem: MOBILITY - ADULT  Goal: Maintain or return to baseline ADL function  Description: INTERVENTIONS:  -  Assess patient's ability to carry out ADLs; assess patient's baseline for ADL function and identify physical deficits which impact ability to perform ADLs (bathing, care of mouth/teeth, toileting, grooming, dressing, etc )  - Assess/evaluate cause of self-care deficits   - Assess range of motion  - Assess patient's mobility; develop plan if impaired  - Assess patient's need for assistive devices and provide as appropriate  - Encourage maximum independence but intervene and supervise when necessary  - Involve family in performance of ADLs  - Assess for home care needs following discharge   - Consider OT consult to assist with ADL evaluation and planning for discharge  - Provide patient education as appropriate  Outcome: Progressing     Problem: Prexisting or High Potential for Compromised Skin Integrity  Goal: Skin integrity is maintained or improved  Description: INTERVENTIONS:  - Identify patients at risk for skin breakdown  - Assess and monitor skin integrity  - Assess and monitor nutrition and hydration status  - Monitor labs   - Assess for incontinence   - Turn and reposition patient  - Assist with mobility/ambulation  - Relieve pressure over bony prominences  - Avoid friction and shearing  - Provide appropriate hygiene as needed including keeping skin clean and dry  - Evaluate need for skin moisturizer/barrier cream  - Collaborate with interdisciplinary team   - Patient/family teaching  - Consider wound care consult   Outcome: Progressing     Problem: Nutrition/Hydration-ADULT  Goal: Nutrient/Hydration intake appropriate for improving, restoring or maintaining nutritional needs  Description: Monitor and assess patient's nutrition/hydration status for malnutrition  Collaborate with interdisciplinary team and initiate plan and interventions as ordered  Monitor patient's weight and dietary intake as ordered or per policy  Utilize nutrition screening tool and intervene as necessary  Determine patient's food preferences and provide high-protein, high-caloric foods as appropriate       INTERVENTIONS:  - Monitor oral intake, urinary output, labs, and treatment plans  - Assess nutrition and hydration status and recommend course of action  - Evaluate amount of meals eaten  - Assist patient with eating if necessary   - Allow adequate time for meals  - Recommend/ encourage appropriate diets, oral nutritional supplements, and vitamin/mineral supplements  - Order, calculate, and assess calorie counts as needed  - Recommend, monitor, and adjust tube feedings and TPN/PPN based on assessed needs  - Assess need for intravenous fluids  - Provide specific nutrition/hydration education as appropriate  - Include patient/family/caregiver in decisions related to nutrition  Outcome: Progressing     Problem: GASTROINTESTINAL - ADULT  Goal: Minimal or absence of nausea and/or vomiting  Description: INTERVENTIONS:  - Administer IV fluids if ordered to ensure adequate hydration  - Maintain NPO status until nausea and vomiting are resolved  - Nasogastric tube if ordered  - Administer ordered antiemetic medications as needed  - Provide nonpharmacologic comfort measures as appropriate  - Advance diet as tolerated, if ordered  - Consider nutrition services referral to assist patient with adequate nutrition and appropriate food choices  Outcome: Progressing  Goal: Maintains or returns to baseline bowel function  Description: INTERVENTIONS:  - Assess bowel function  - Encourage oral fluids to ensure adequate hydration  - Administer IV fluids if ordered to ensure adequate hydration  - Administer ordered medications as needed  - Encourage mobilization and activity  - Consider nutritional services referral to assist patient with adequate nutrition and appropriate food choices  Outcome: Progressing  Goal: Maintains adequate nutritional intake  Description: INTERVENTIONS:  - Monitor percentage of each meal consumed  - Identify factors contributing to decreased intake, treat as appropriate  - Assist with meals as needed  - Monitor I&O, weight, and lab values if indicated  - Obtain nutrition services referral as needed  Outcome: Progressing     Problem: GENITOURINARY - ADULT  Goal: Maintains or returns to baseline urinary function  Description: INTERVENTIONS:  - Assess urinary function  - Encourage oral fluids to ensure adequate hydration if ordered  - Administer IV fluids as ordered to ensure adequate hydration  - Administer ordered medications as needed  - Offer frequent toileting  - Follow urinary retention protocol if ordered  Outcome: Progressing  Goal: Absence of urinary retention  Description: INTERVENTIONS:  - Assess patients ability to void and empty bladder  - Monitor I/O  - Bladder scan as needed  - Discuss with physician/AP medications to alleviate retention as needed  - Discuss catheterization for long term situations as appropriate  Outcome: Progressing  Goal: Urinary catheter remains patent  Description: INTERVENTIONS:  - Assess patency of urinary catheter  - If patient has a chronic mehta, consider changing catheter if non-functioning  - Follow guidelines for intermittent irrigation of non-functioning urinary catheter  Outcome: Progressing     Problem: METABOLIC, FLUID AND ELECTROLYTES - ADULT  Goal: Electrolytes maintained within normal limits  Description: INTERVENTIONS:  - Monitor labs and assess patient for signs and symptoms of electrolyte imbalances  - Administer electrolyte replacement as ordered  - Monitor response to electrolyte replacements, including repeat lab results as appropriate  - Instruct patient on fluid and nutrition as appropriate  Outcome: Progressing  Goal: Fluid balance maintained  Description: INTERVENTIONS:  - Monitor labs   - Monitor I/O and WT  - Instruct patient on fluid and nutrition as appropriate  - Assess for signs & symptoms of volume excess or deficit  Outcome: Progressing     Problem: PAIN - ADULT  Goal: Verbalizes/displays adequate comfort level or baseline comfort level  Description: Interventions:  - Encourage patient to monitor pain and request assistance  - Assess pain using appropriate pain scale  - Administer analgesics based on type and severity of pain and evaluate response  - Implement non-pharmacological measures as appropriate and evaluate response  - Consider cultural and social influences on pain and pain management  - Notify physician/advanced practitioner if interventions unsuccessful or patient reports new pain  Outcome: Progressing     Problem: INFECTION - ADULT  Goal: Absence or prevention of progression during hospitalization  Description: INTERVENTIONS:  - Assess and monitor for signs and symptoms of infection  - Monitor lab/diagnostic results  - Monitor all insertion sites, i e  indwelling lines, tubes, and drains  - Monitor endotracheal if appropriate and nasal secretions for changes in amount and color  - Bertrand appropriate cooling/warming therapies per order  - Administer medications as ordered  - Instruct and encourage patient and family to use good hand hygiene technique  - Identify and instruct in appropriate isolation precautions for identified infection/condition  Outcome: Progressing  Goal: Absence of fever/infection during neutropenic period  Description: INTERVENTIONS:  - Monitor WBC    Outcome: Progressing     Problem: SAFETY ADULT  Goal: Patient will remain free of falls  Description: INTERVENTIONS:  - Educate patient/family on patient safety including physical limitations  - Instruct patient to call for assistance with activity   - Consult OT/PT to assist with strengthening/mobility   - Keep Call bell within reach  - Keep bed low and locked with side rails adjusted as appropriate  - Keep care items and personal belongings within reach  - Initiate and maintain comfort rounds  - Make Fall Risk Sign visible to staff  - Offer Toileting every 2 Hours, in advance of need  - Initiate/Maintain bed alarm  - Obtain necessary fall risk management equipment:   - Apply yellow socks and bracelet for high fall risk patients  - Consider moving patient to room near nurses station  Outcome: Progressing  Goal: Maintain or return to baseline ADL function  Description: INTERVENTIONS:  -  Assess patient's ability to carry out ADLs; assess patient's baseline for ADL function and identify physical deficits which impact ability to perform ADLs (bathing, care of mouth/teeth, toileting, grooming, dressing, etc )  - Assess/evaluate cause of self-care deficits   - Assess range of motion  - Assess patient's mobility; develop plan if impaired  - Assess patient's need for assistive devices and provide as appropriate  - Encourage maximum independence but intervene and supervise when necessary  - Involve family in performance of ADLs  - Assess for home care needs following discharge   - Consider OT consult to assist with ADL evaluation and planning for discharge  - Provide patient education as appropriate  Outcome: Progressing  Goal: Maintains/Returns to pre admission functional level  Description: INTERVENTIONS:  - Perform BMAT or MOVE assessment daily    - Set and communicate daily mobility goal to care team and patient/family/caregiver  - Collaborate with rehabilitation services on mobility goals if consulted  - Perform Range of Motion 2 times a day  - Reposition patient every 22 hours  - Out of bed for toileting  - Record patient progress and toleration of activity level   Outcome: Progressing     Problem: DISCHARGE PLANNING  Goal: Discharge to home or other facility with appropriate resources  Description: INTERVENTIONS:  - Identify barriers to discharge w/patient and caregiver  - Arrange for needed discharge resources and transportation as appropriate  - Identify discharge learning needs (meds, wound care, etc )  - Arrange for interpretive services to assist at discharge as needed  - Refer to Case Management Department for coordinating discharge planning if the patient needs post-hospital services based on physician/advanced practitioner order or complex needs related to functional status, cognitive ability, or social support system  Outcome: Progressing     Problem: Knowledge Deficit  Goal: Patient/family/caregiver demonstrates understanding of disease process, treatment plan, medications, and discharge instructions  Description: Complete learning assessment and assess knowledge base    Interventions:  - Provide teaching at level of understanding  - Provide teaching via preferred learning methods  Outcome: Progressing

## 2022-10-01 NOTE — QUICK NOTE
Gm stain of bile specimen sent for cx when cholecystotomy tube was placed showed few GPC - cx is still pending     - Cont  Meropenem for now and po Vanco for c diff prophylaxis  - Awaiting final bile cx results - will re-adjust abx according to these results

## 2022-10-01 NOTE — ASSESSMENT & PLAN NOTE
· Follows with Dr Destiny Cody  · On Velcade and decadron last tx 9/6  · Free light chains ordered outpt f/u with oncology

## 2022-10-01 NOTE — ASSESSMENT & PLAN NOTE
9/23 this morning patient started to develop some nausea and vomiting, vomiting noted to be dark in color, no dark stools noted in rectal tube  Gi started patient on IV PPI b i d  Clear liquid diet  Most likely secondary to large hiatal hernia on previous EGD  9/24 rectal tube noted to have dark stools  N/V resolved, continues to have abdominal tenderness  Hg is 9 4  CT abd/pelvis- Distended gallbladder with cholelithiasis, wall thickening, and pericholecystic inflammatory change  There appear to be calculi extending into the cystic duct  Findings can be due to acute cholecystitis in the appropriate clinical setting  Consider gallbladder ultrasound  2  Previously seen inflammatory changes related to acute sigmoid diverticulitis are no longer visualized "  RUQ US- " Again seen is a distended gallbladder with gallbladder wall thickening up to 15 mm, also containing intraluminal air, gallstones, sludge  Sonographic Lentz's sign is negative "  Surgical follow-up noted  As per surgery patient is not a candidate for cholecystectomy at this time  They recommended cholecystomy tube placement  · HIDA scan showed-Nonvisualization of the gallbladder concerning for acute cholecystitis    · Continue IV antibiotics  · Patient  underwent cholecystomy tube placement by IR on September 29  · Follow-up culture results from cholecystomy procedure  · ID recommends continue antibiotics through weekend  · Patient is tolerating diet

## 2022-10-01 NOTE — PROGRESS NOTES
Progress note - [facility]   Rito Alpers 81 y o  male MRN: [id number]  Unit/Bed#: -01 Encounter: [id number]    ASSESSMENT and PLAN    1  Diverticulitis -resolved  Admitted with abdominal pain, CT scan consistent with diverticulitis  Treated with Zosyn times 10 days     Repeat CT scan 9/24 showed resolution of sigmoid diverticulitis     2  Acute cholecystitis  Gallbladder distended with wall thickening and alaina cholecystic inflammatory changes, cystic duct calculi on CT scan  Denies abdominal pain but tender with palpation all quadrants  HIDA scan 9/28 shows nonvisualization of gallbladder consistent with acute cholecystitis  Cholecystostomy tube placed 9/29-draining bloody fluid, cultures pending  -Surgery following and Infectious Disease following  -Continue IV antibiotics through the weekend per ID     2  Diarrhea  Developed diarrhea, stool negative for C diff x2  Likely multifactorial related to antibiotic use and current chemotherapy for multiple myeloma  Continues to have liquid stool via if fecal management system but appears to be decreasing-350 cc recorded over past 24 hours  -continue Questran b i d   -continue Lomotil q 12 hours-can increase to t i d  If diarrhea continued  -on vancomycin prophylaxis due to continued antibiotics     3  Nausea/vomiting  Repeat EGD  9/26 showed small gastric polyp which was excised, large sliding hiatal hernia without Demian lesions, stomach dilated with large amount of retained liquid, concern for gastroparesis  Improved after cholecystotomy tube  -advanced diet, Ensure supplement  -Reglan discontinued 9/29 due to hallucinations and confusion  -continue pantoprazole 40 mg b i d   -consider gastric emptying study after gallbladder decompressed    Chief Complaint   Patient presents with    Abnormal Lab     Patient arrives via EMS from [facility] for an elevated creatinine of 7 3  Reports yellow emesis for a few days   Patient has RUQ and RLQ abdominal pain, is currently getting chemo once a week for kidney cancer  SUBJECTIVE/HPI   Tolerating regular diet  Still having loose stools via fecal management system  No abdominal pain    /65   Pulse 73   Temp 97 8 °F (36 6 °C)   Resp 18   Ht 6' 3" (1 905 m)   Wt 116 kg (255 lb 4 7 oz)   SpO2 96%   BMI 31 91 kg/m²     PHYSICALEXAM  General appearance: alert, appears stated age and cooperative  Eyes: PERLLA, EOMI, no icterus   Head: Normocephalic, without obvious abnormality, atraumatic  Lungs: clear to auscultation bilaterally  Heart: regular rate and rhythm, S1, S2 normal, no murmur, click, rub or gallop  Abdomen: soft, non-tender; bowel sounds normal; no masses,  no organomegaly    Right upper quadrant drain  Extremities: extremities normal, atraumatic, no cyanosis or edema  Neurologic: Grossly normal    Lab Results   Component Value Date    GLUCOSE 64 (L) 08/01/2022    CALCIUM 9 6 09/30/2022     01/15/2018    K 4 0 09/30/2022    CO2 28 09/30/2022    CL 98 09/30/2022    BUN 24 09/30/2022    CREATININE 6 49 (H) 09/30/2022     Lab Results   Component Value Date    WBC 7 53 09/30/2022    HGB 9 4 (L) 09/30/2022    HCT 31 0 (L) 09/30/2022    MCV 87 09/30/2022     (H) 09/30/2022     Lab Results   Component Value Date    ALT 14 09/30/2022    AST 37 09/30/2022     (H) 11/03/2019    ALKPHOS 183 (H) 09/30/2022    BILITOT 0 6 01/15/2018     Lab Results   Component Value Date    LIPASE 691 (H) 09/13/2022     Lab Results   Component Value Date    IRON 17 (L) 09/23/2022    TIBC 174 (L) 09/23/2022    FERRITIN 479 (H) 09/23/2022     Lab Results   Component Value Date    INR 1 23 (H) 10/01/2022

## 2022-10-01 NOTE — PROGRESS NOTES
[place]  Progress Note - Andrew Leader 1950, 81 y o  male MRN: [id number]  Unit/Bed#: -01 Encounter: [id number]  Primary Care Provider: Gustavo Wen MD   Date and time admitted to hospital: 9/12/2022  4:09 PM    Acute cholecystitis  Assessment & Plan  9/23 this morning patient started to develop some nausea and vomiting, vomiting noted to be dark in color, no dark stools noted in rectal tube  Gi started patient on IV PPI b i d  Clear liquid diet  Most likely secondary to large hiatal hernia on previous EGD  9/24 rectal tube noted to have dark stools  N/V resolved, continues to have abdominal tenderness  Hg is 9 4  CT abd/pelvis- Distended gallbladder with cholelithiasis, wall thickening, and pericholecystic inflammatory change  There appear to be calculi extending into the cystic duct  Findings can be due to acute cholecystitis in the appropriate clinical setting  Consider gallbladder ultrasound  2  Previously seen inflammatory changes related to acute sigmoid diverticulitis are no longer visualized "  RUQ US- " Again seen is a distended gallbladder with gallbladder wall thickening up to 15 mm, also containing intraluminal air, gallstones, sludge  Sonographic Lentz's sign is negative "  Surgical follow-up noted  As per surgery patient is not a candidate for cholecystectomy at this time  They recommended cholecystomy tube placement  · HIDA scan showed-Nonvisualization of the gallbladder concerning for acute cholecystitis  · Continue IV antibiotics  · Patient  underwent cholecystomy tube placement by IR on September 29  · Follow-up culture results from cholecystomy procedure  · ID recommends continue antibiotics through weekend  · Patient is tolerating diet    Abnormal CT scan  Assessment & Plan  · CTAP-common bile duct stent with pneumobilia and air within gallblader  Distended gallbladder with cholelithiasis     · RUQ u/s distended gallbladder with air within stent in CBD similar to CT  · Surgery consulted  If concern for acute cholecystitis not surgical candidate would need per vanessa tube  · GI following  · ERCP 8/29/2022, Dr Flavio Tomas, Avelina Bald esophagus seen during EGD, biopsy obtained   ERCP performed with endoscopic mucosal resection of abnormal looking ampullary tissue, PD, CBD stent placed  · No s/s acute cholecystitis monitor   · Surgical and GI follow-up noted    Acute diverticulitis  Assessment & Plan  · 2nd episode in one month (tx with 10 days antibx 8/2-8/11)  · CTAP acute diverticulitis  · GI following  · 7/25 Colonoscopy--2 polyps removed, Mild diverticula in the descending colon and sigmoid colon, Small, internal hemorrhoids  · 9/22 finished 10 day course of zosyn  · Questran started 9/17   · Imodium PRN  · Bentyl added 9/18 by GI  · advance diet per gi recommendations  · Gi suspects  multifactorial probably were related to the antibiotics that he is receiving for the diverticulitis as well as a chemo effect from his myeloma  May need to hold chemo on discharge for a period of time  Treatment at this point is supportive  · Monitor rectal tube output, Stool appears much darker today  · After discontinuation of Zosyn the following day on 09/23 patient started to develop leukocytosis with elevated procalcitonin  · Consulted Infectious Disease  · C diff is negative  · 9/24 Started patient on p o  Vancomycin and Zosyn  · ID switched patient to [address] [place] [address]  Continue Merrem and oral vancomycin    Anemia  Assessment & Plan  · Baseline hgb 8  · Received IV venofer  · Trend H&H  · Transfuse for hgb <7  · Patient underwent EGD on 09/26 which showed One 3 mm polyp in the stomach removed with biopsy forceps  Large sliding hiatal hernia without Demian lesions     Biliary stents in place draining  Dilation of the stomach with large amount of retained   liquid upon entrance to the stomach   Suctioned out  Cherylyn Samples for element of   Gastroparesis  · Continue Protonix      Ambulatory dysfunction  Assessment & Plan  · 2/2 L AKA  · PT/OT    Multiple myeloma ([facility] )  Assessment & Plan  · Follows with Dr Andi León  · On Velcade and decadron last tx 9/6  · Free light chains ordered outpt f/u with oncology    Diabetes mellitus, type 2 [facility])  Assessment & Plan  Lab Results   Component Value Date    HGBA1C 6 3 (H) 08/05/2022       Recent Labs     09/30/22  0708 09/30/22  1136 09/30/22  1608 09/30/22  2047   POCGLU 136 141* 159* 145*       Blood Sugar Average: Last 72 hrs:  (P) 830 [id number]   · Hold lantus  · SSI    NALLELY (obstructive sleep apnea)  Assessment & Plan  · Refuses cpap    Chronic combined systolic and diastolic congestive heart failure (HCC)  Assessment & Plan  Wt Readings from Last 3 Encounters:   10/01/22 116 kg (255 lb 4 7 oz)   09/06/22 117 kg (257 lb 15 oz)   08/30/22 118 kg (260 lb)     · EF 45% decreased RV fx  · I/O  · Daily weight  · Currently being managed with HD  · Demadex discontinued due to low bp and minimal urine output  Morbid obesity ([place] [address] )  Assessment & Plan  · Dietary education    Chronic atrial fibrillation [facility])  Assessment & Plan  · 9/18 Bradycardia with Junctional escape beats 20's overnight with hypotension  · No rate control meds d/c in august due to bradycardia  · TSH 0 4  · Cards consulted recommending CPAP HS and occurs with apnea episodes   · Patient is scheduled for cholecystomy tube placement today  · Continue on Coumadin 5 mg daily    INR this morning is 1 23  · Trend PT INR        * DORA (acute kidney injury) ([facility] )  Assessment & Plan  · DORA on CKD 3  · Most recent d/c creat 3-3 7 prior in 2 range  · CT no hydro, nonobstructing calculi  · UA neg  · Nephrology following  · 9/15 R tunneled HD cath placed  · 9/15 and 9/16 IHD  · Monitor for renal recovery remains oliguric  · S/p IHD on/ 9/19   · Currently on m/w/f schedule with no signs of renal recovery  · Temporary to PermCath conversion 09/21/2022  · No signs of renal recovery  · Continue dialysis as per Nephrology  · Nephrology on board      Labs & Imaging: I have personally reviewed pertinent reports  VTE Prophylaxis: in place  Code Status:   Level 1 - Full Code    Patient Centered Rounds: I have performed bedside rounds with nursing staff today  Discussions with Specialists or Other Care Team Provider:  GI    Education and Discussions with Family / Patient:  Patient declined family update    Current Length of Stay: 23 day(s)    Current Patient Status: Inpatient   Certification Statement: The patient will continue to require additional inpatient hospital stay due to see my assessment and plan  Subjective:   Patient is seen and examined at bedside  Denies any new complaints  Abdominal pain has improved significantly  No nausea or vomiting  Afebrile  All other ROS are negative  Objective:    Vitals: Blood pressure 116/64, pulse 61, temperature 97 7 °F (36 5 °C), resp  rate 18, height 6' 3" (1 905 m), weight 116 kg (255 lb 4 7 oz), SpO2 99 %  ,Body mass index is 31 91 kg/m²  SPO2 RA Rest    Flowsheet Row ED to Hosp-Admission (Current) from 9/12/2022 in [address] Med Surg Unit   SpO2 99 %   SpO2 Activity At Rest   O2 Device None (Room air)   O2 Flow Rate --        I&O:     Intake/Output Summary (Last 24 hours) at 10/1/2022 0649  Last data filed at 9/30/2022 1933  Gross per 24 hour   Intake 1410 ml   Output 1525 ml   Net -115 ml       Physical Exam:    General- Alert, lying comfortably in bed  Not in any acute distress  Neck- Supple, No JVD  CVS- regular, S1 and S2 normal  Chest- Bilateral Air entry, No rhochi, crackles or wheezing present  Abdomen- soft, cholecystomy tube in place, not distended, no guarding or rigidity, BS+  Extremities-  No pedal edema, No calf tenderness  Able to wiggle the toes  CNS-   Alert, awake and orientedx3  No focal deficits present      Invasive Devices  Report    Central Venous Catheter Line  Duration CVC Central Lines 09/22/22 Double 8 days          Peripheral Intravenous Line  Duration           Peripheral IV 10/01/22 Dorsal (posterior); Right Forearm <1 day          Hemodialysis Catheter  Duration           HD Permanent Double Catheter 8 days          Drain  Duration           Rectal Tube With balloon 15 days    Cholecystostomy Tube RUQ 1 day                      Social History  reviewed  Family History   Problem Relation Age of Onset    Other Mother         CARDIAC DISORDER     Diabetes Mother     Cancer Father     Other Family         CARDIAC DISORDER     Diabetes Family     Cancer Family     Mental illness Family     Kidney disease Sister     Diabetes Sister     reviewed    Meds:  Current Facility-Administered Medications   Medication Dose Route Frequency Provider Last Rate Last Admin    acetaminophen (TYLENOL) tablet 650 mg  650 mg Oral Q6H PRN Christi Rascon MD        albuterol (PROVENTIL HFA,VENTOLIN HFA) inhaler 2 puff  2 puff Inhalation Q6H PRN Christi Rascon MD        allopurinol (ZYLOPRIM) tablet 300 mg  300 mg Oral Daily Christi Rascon MD   300 mg at 09/29/22 0847    atorvastatin (LIPITOR) tablet 40 mg  40 mg Oral After Mauricio Kimball MD   40 mg at 09/30/22 1655    b complex-vitamin C-folic acid (NEPHROCAPS) capsule 1 capsule  1 capsule Oral Daily With Brant Winchester MD   1 capsule at 09/30/22 1532    cholestyramine sugar free (QUESTRAN LIGHT) packet 4 g  4 g Oral BID Christi Rascon MD   4 g at 09/30/22 2051    clotrimazole (LOTRIMIN) 1 % cream   Topical BID Christi Rascon MD   Given at 09/30/22 1612    dicyclomine (BENTYL) capsule 10 mg  10 mg Oral 4x Daily (AC & HS) Christi Rascon MD   10 mg at 09/30/22 2250    diphenoxylate-atropine (LOMOTIL) 2 5-0 025 mg per tablet 1 tablet  1 tablet Oral Q12H NICOLA Jimenez   1 tablet at 09/30/22 2250    fluticasone-vilanterol (BREO ELLIPTA) 200-25 MCG/INH inhaler 1 puff  1 puff Inhalation Daily Christi Rascon MD   1 puff at 09/30/22 0951    insulin lispro (HumaLOG) 100 units/mL subcutaneous injection 1-6 Units  1-6 Units Subcutaneous TID With Meals Krystal Newman MD   1 Units at 09/30/22 1608    melatonin tablet 6 mg  6 mg Oral HS Krystal Newman MD   6 mg at 09/30/22 2250    meropenem (MERREM) 500 mg in sodium chloride 0 9 % 50 mL IVPB  500 mg Intravenous Q24H Krystal Newman  mL/hr at 09/30/22 1609 500 mg at 09/30/22 1609    midodrine (PROAMATINE) tablet 10 mg  10 mg Oral TID [facility] Krystal Newman MD   10 mg at 09/30/22 1532    ondansetron (ZOFRAN) injection 4 mg  4 mg Intravenous Q6H PRN Krystal Newman MD   4 mg at 09/26/22 1023    pantoprazole (PROTONIX) EC tablet 40 mg  40 mg Oral Early Morning Audi Dennis MD   40 mg at 09/30/22 0525    trimethobenzamide (TIGAN) IM injection 200 mg  200 mg Intramuscular Q6H PRN Krystal Newman MD   200 mg at 09/23/22 1004    vancomycin (VANCOCIN) capsule 125 mg  125 mg Oral BID Krystal Newman MD   125 mg at 09/30/22 1655    warfarin (COUMADIN) tablet 5 mg  5 mg Oral Daily (warfarin) Kishan Damico MD   5 mg at 09/30/22 1655      Medications Prior to Admission   Medication    acetaminophen (TYLENOL) 500 mg tablet    albuterol (PROVENTIL HFA,VENTOLIN HFA) 90 mcg/act inhaler    Alcohol Swabs (ALCOHOL PREP) 70 % PADS    allopurinol (ZYLOPRIM) 300 mg tablet    ammonium lactate (LAC-HYDRIN) 12 % lotion    atorvastatin (LIPITOR) 40 mg tablet    BD AUTOSHIELD DUO 30G X 5 MM MISC    BD INSULIN SYRINGE U/F 31G X 5/16" 0 5 ML MISC    BD PEN NEEDLE HILARIO U/F 32G X 4 MM MISC    bortezomib (VELCADE)    cholestyramine sugar free (QUESTRAN LIGHT) 4 g packet    clotrimazole (LOTRIMIN) 1 % cream    Easy Touch Safety Lancets 28G MISC    fluticasone (FLONASE) 50 mcg/act nasal spray    fluticasone-salmeterol (ADVAIR DISKUS, WIXELA INHUB) 250-50 mcg/dose inhaler    Insulin Glargine Solostar (Lantus SoloStar) 100 UNIT/ML SOPN    liraglutide (Victoza) injection    loperamide (IMODIUM) 2 mg capsule    multivitamin-minerals therapeutic (THERA-M)  NOVOLOG FLEXPEN 100 units/mL SOPN    nystatin (MYCOSTATIN) powder    omeprazole (PriLOSEC) 40 MG capsule    ondansetron (ZOFRAN) 4 mg tablet    potassium chloride (K-DUR,KLOR-CON) 20 mEq tablet    torsemide (DEMADEX) 20 mg tablet       Labs:  Results from last 7 days   Lab Units 09/30/22  0620 09/29/22  0652 09/28/22  0458 09/27/22  0530   WBC Thousand/uL 7 53 7 13 7 33 7 03   HEMOGLOBIN g/dL 9 4* 8 7* 8 2* 8 0*   HEMATOCRIT % 31 0* 28 7* 27 5* 27 0*   PLATELETS Thousands/uL 454* 345 318 232   NEUTROS PCT %  --  67 69 72   LYMPHS PCT %  --  16 16 9*   LYMPHO PCT % 19  --   --   --    MONOS PCT %  --  11 10 13*   MONO PCT % 10  --   --   --    EOS PCT % 3 3 3 4     Results from last 7 days   Lab Units 09/30/22 0620 09/29/22  0652 09/28/22  0458 09/27/22  0530   POTASSIUM mmol/L 4 0 3 7 3 5 3 3*   CHLORIDE mmol/L 98 99 97 97   CO2 mmol/L 28 28 24 28   BUN mg/dL 24 19 38* 26*   CREATININE mg/dL 6 49* 4 79* 7 81* 5 88*   CALCIUM mg/dL 9 6 9 0 8 8 8 8   ALK PHOS U/L 183*  --  145* 141*   ALT U/L 14  --  12 11*   AST U/L 37  --  18 21     Lab Results   Component Value Date    TROPONINI 5 19 (H) 06/27/2020    TROPONINI 5 85 (H) 06/27/2020    TROPONINI 6 35 (H) 06/27/2020    CKMB 4 1 06/25/2020    CKTOTAL 271 06/25/2020    CKTOTAL 53 03/23/2018    CKTOTAL 55 10/08/2017     Results from last 7 days   Lab Units 10/01/22  0457 09/30/22  0620 09/29/22  0652   INR  1 23* 1 14 1 13     Lab Results   Component Value Date    BLOODCX No Growth After 4 Days  09/25/2022    BLOODCX No Growth After 4 Days  09/25/2022    BLOODCX No Growth After 5 Days  09/12/2022    BLOODCX No Growth After 5 Days   09/12/2022    URINECX >100,000 cfu/ml Escherichia coli (A) 12/05/2019    WOUNDCULT 4+ Growth of Proteus mirabilis (A) 07/25/2019    WOUNDCULT 4+ Growth of  07/25/2019    WOUNDCULT (A) 07/25/2019     4+ Growth of Methicillin Resistant Staphylococcus aureus    WOUNDCULT 1+ Growth of Proteus mirabilis (A) 07/25/2019    WOUNDCULT 4+ Growth of  07/25/2019    SPUTUMCULTUR 4+ Growth of  07/11/2020    SPUTUMCULTUR 1+ Growth of Aspergillus fumigatus (A) 07/11/2020         Imaging:  Results for orders placed during the hospital encounter of 09/12/22    XR chest portable    Narrative  CHEST    INDICATION:   fever  COMPARISON:  9/12/2022    EXAM PERFORMED/VIEWS:  XR CHEST PORTABLE      FINDINGS:  There is a right-sided dialysis catheter, placed since the previous study  Heart shadow is enlarged but unchanged from prior exam  There is a large hiatal hernia  There is mild bibasilar atelectasis  Evaluation of the left lung bases otherwise limited by enlarged heart and hernia  No pneumothorax or definite pleural effusion  Osseous structures appear within normal limits for patient age  Impression  Limited evaluation of the left base  Mild bibasilar atelectasis, cardiomegaly, and large hiatal hernia  Workstation performed: [id number]    Results for orders placed during the hospital encounter of 10/04/21    XR chest pa & lateral    Narrative  CHEST    INDICATION:   follow up lung consolidations  COMPARISON:  CTA chest/abdomen/pelvis 10/4/2021  Chest radiograph 7/8/2020  EXAM PERFORMED/VIEWS:  XR CHEST PA & LATERAL      FINDINGS:    Heart shadow is enlarged but unchanged from prior exam     Again seen is dense left lower lobe/retrocardiac consolidation with ill-defined airspace opacities in the lingula and right lower lobe, similar to recent chest CT  Trace left pleural effusion  No pneumothorax  Osseous structures appear within normal limits for patient age  Impression  Stable left lower lobe/retrocardiac consolidation and lingular and right lower lobe ill-defined airspace opacities  Trace left pleural effusion          Workstation performed: [id number]      Last 24 Hours Medication List:   Current Facility-Administered Medications   Medication Dose Route Frequency Provider Last Rate    acetaminophen  650 mg Oral Q6H PRN Krystal Newman MD      albuterol  2 puff Inhalation Q6H PRN Krystal Newman MD      allopurinol  300 mg Oral Daily Krystal Newman MD      atorvastatin  40 mg Oral After Antionette Howell MD      b complex-vitamin C-folic acid  1 capsule Oral Daily With Nina Vale MD      cholestyramine sugar free  4 g Oral BID Krystal Newman MD      clotrimazole   Topical BID Krystal Newman MD      dicyclomine  10 mg Oral 4x Daily (AC & HS) Krystal Newman MD      diphenoxylate-atropine  1 tablet Oral Q12H NICOLA Arndt      fluticasone-vilanterol  1 puff Inhalation Daily Krystal Newman MD      insulin lispro  1-6 Units Subcutaneous TID With Meals Krystal Newman MD      melatonin  6 mg Oral HS Krystal Newman MD      meropenem  500 mg Intravenous Q24H Krystal Newman  mg (09/30/22 1609)    midodrine  10 mg Oral TID AC Krystal Newman MD      ondansetron  4 mg Intravenous Q6H PRN Krystal Newman MD      pantoprazole  40 mg Oral Early Morning Audi Dennis MD      trimethobenzamide  200 mg Intramuscular Q6H PRN Krystal Newman MD      vancomycin  125 mg Oral BID Krystal Newman MD      warfarin  5 mg Oral Daily (warfarin) Kishan Damico MD          Today, Patient Was Seen By: Kishan Damico    ** Please Note: Dictation voice to text software may have been used in the creation of this document   **

## 2022-10-01 NOTE — ASSESSMENT & PLAN NOTE
· Baseline hgb 8  · Received IV venofer  · Trend H&H  · Transfuse for hgb <7  · Patient underwent EGD on 09/26 which showed One 3 mm polyp in the stomach removed with biopsy forceps  Large sliding hiatal hernia without Demian lesions     Biliary stents in place draining  Dilation of the stomach with large amount of retained   liquid upon entrance to the stomach   Suctioned out  Clive Plain for element of   Gastroparesis  · Continue Protonix

## 2022-10-01 NOTE — ASSESSMENT & PLAN NOTE
· 9/18 Bradycardia with Junctional escape beats 20's overnight with hypotension  · No rate control meds d/c in august due to bradycardia  · TSH 0 4  · Cards consulted recommending CPAP HS and occurs with apnea episodes   · Patient is scheduled for cholecystomy tube placement today  · Continue on Coumadin 5 mg daily    INR this morning is 1 23  · Trend PT INR

## 2022-10-01 NOTE — ASSESSMENT & PLAN NOTE
· CTAP-common bile duct stent with pneumobilia and air within gallblader  Distended gallbladder with cholelithiasis  · RUQ u/s distended gallbladder with air within stent in CBD similar to CT  · Surgery consulted  If concern for acute cholecystitis not surgical candidate would need per vanessa tube  · GI following  · ERCP 8/29/2022, Dr Jose F Kahn, Kaylie Chavez esophagus seen during EGD, biopsy obtained   ERCP performed with endoscopic mucosal resection of abnormal looking ampullary tissue, PD, CBD stent placed    · No s/s acute cholecystitis monitor   · Surgical and GI follow-up noted

## 2022-10-01 NOTE — ASSESSMENT & PLAN NOTE
Lab Results   Component Value Date    HGBA1C 6 3 (H) 08/05/2022       Recent Labs     09/30/22  0708 09/30/22  1136 09/30/22  1608 09/30/22 2047   POCGLU 136 141* 159* 145*       Blood Sugar Average: Last 72 hrs:  (P) 429 [id number]   · Hold lantus  · SSI

## 2022-10-02 LAB
ANION GAP SERPL CALCULATED.3IONS-SCNC: 11 MMOL/L (ref 4–13)
ANISOCYTOSIS BLD QL SMEAR: PRESENT
BACTERIA SPEC BFLD CULT: ABNORMAL
BASOPHILS # BLD MANUAL: 0 THOUSAND/UL (ref 0–0.1)
BASOPHILS NFR MAR MANUAL: 0 % (ref 0–1)
BUN SERPL-MCNC: 17 MG/DL (ref 5–25)
CALCIUM SERPL-MCNC: 9.4 MG/DL (ref 8.3–10.1)
CHLORIDE SERPL-SCNC: 100 MMOL/L (ref 96–108)
CO2 SERPL-SCNC: 30 MMOL/L (ref 21–32)
CREAT SERPL-MCNC: 6.15 MG/DL (ref 0.6–1.3)
EOSINOPHIL # BLD MANUAL: 0.32 THOUSAND/UL (ref 0–0.4)
EOSINOPHIL NFR BLD MANUAL: 5 % (ref 0–6)
ERYTHROCYTE [DISTWIDTH] IN BLOOD BY AUTOMATED COUNT: 17.3 % (ref 11.6–15.1)
GFR SERPL CREATININE-BSD FRML MDRD: 8 ML/MIN/1.73SQ M
GLUCOSE SERPL-MCNC: 117 MG/DL (ref 65–140)
GLUCOSE SERPL-MCNC: 144 MG/DL (ref 65–140)
GLUCOSE SERPL-MCNC: 152 MG/DL (ref 65–140)
GLUCOSE SERPL-MCNC: 155 MG/DL (ref 65–140)
GLUCOSE SERPL-MCNC: 174 MG/DL (ref 65–140)
GRAM STN SPEC: ABNORMAL
GRAM STN SPEC: ABNORMAL
HCT VFR BLD AUTO: 34.5 % (ref 36.5–49.3)
HGB BLD-MCNC: 10.1 G/DL (ref 12–17)
INR PPP: 1.46 (ref 0.84–1.19)
LYMPHOCYTES # BLD AUTO: 1.41 THOUSAND/UL (ref 0.6–4.47)
LYMPHOCYTES # BLD AUTO: 22 % (ref 14–44)
MCH RBC QN AUTO: 25.6 PG (ref 26.8–34.3)
MCHC RBC AUTO-ENTMCNC: 29.3 G/DL (ref 31.4–37.4)
MCV RBC AUTO: 87 FL (ref 82–98)
METAMYELOCYTES NFR BLD MANUAL: 1 % (ref 0–1)
MONOCYTES # BLD AUTO: 0.7 THOUSAND/UL (ref 0–1.22)
MONOCYTES NFR BLD: 11 % (ref 4–12)
NEUTROPHILS # BLD MANUAL: 3.9 THOUSAND/UL (ref 1.85–7.62)
NEUTS BAND NFR BLD MANUAL: 1 % (ref 0–8)
NEUTS SEG NFR BLD AUTO: 60 % (ref 43–75)
PLATELET # BLD AUTO: 436 THOUSANDS/UL (ref 149–390)
PLATELET BLD QL SMEAR: ABNORMAL
PMV BLD AUTO: 9.8 FL (ref 8.9–12.7)
POLYCHROMASIA BLD QL SMEAR: PRESENT
POTASSIUM SERPL-SCNC: 3.6 MMOL/L (ref 3.5–5.3)
PROTHROMBIN TIME: 18.6 SECONDS (ref 11.6–14.5)
RBC # BLD AUTO: 3.95 MILLION/UL (ref 3.88–5.62)
SODIUM SERPL-SCNC: 141 MMOL/L (ref 135–147)
WBC # BLD AUTO: 6.4 THOUSAND/UL (ref 4.31–10.16)

## 2022-10-02 PROCEDURE — 80048 BASIC METABOLIC PNL TOTAL CA: CPT | Performed by: INTERNAL MEDICINE

## 2022-10-02 PROCEDURE — 85007 BL SMEAR W/DIFF WBC COUNT: CPT | Performed by: INTERNAL MEDICINE

## 2022-10-02 PROCEDURE — 99232 SBSQ HOSP IP/OBS MODERATE 35: CPT | Performed by: INTERNAL MEDICINE

## 2022-10-02 PROCEDURE — 85610 PROTHROMBIN TIME: CPT | Performed by: INTERNAL MEDICINE

## 2022-10-02 PROCEDURE — 85027 COMPLETE CBC AUTOMATED: CPT | Performed by: INTERNAL MEDICINE

## 2022-10-02 PROCEDURE — 82948 REAGENT STRIP/BLOOD GLUCOSE: CPT

## 2022-10-02 RX ADMIN — CLOTRIMAZOLE: 0.01 CREAM TOPICAL at 08:51

## 2022-10-02 RX ADMIN — MIDODRINE HYDROCHLORIDE 10 MG: 5 TABLET ORAL at 07:43

## 2022-10-02 RX ADMIN — ATORVASTATIN CALCIUM 40 MG: 40 TABLET, FILM COATED ORAL at 17:23

## 2022-10-02 RX ADMIN — WARFARIN SODIUM 5 MG: 5 TABLET ORAL at 17:24

## 2022-10-02 RX ADMIN — DIPHENOXYLATE HYDROCHLORIDE AND ATROPINE SULFATE 1 TABLET: 2.5; .025 TABLET ORAL at 11:39

## 2022-10-02 RX ADMIN — INSULIN LISPRO 1 UNITS: 100 INJECTION, SOLUTION INTRAVENOUS; SUBCUTANEOUS at 08:51

## 2022-10-02 RX ADMIN — DAPTOMYCIN 400 MG: 500 INJECTION, POWDER, LYOPHILIZED, FOR SOLUTION INTRAVENOUS at 14:06

## 2022-10-02 RX ADMIN — ALLOPURINOL 300 MG: 300 TABLET ORAL at 08:50

## 2022-10-02 RX ADMIN — FLUTICASONE FUROATE AND VILANTEROL TRIFENATATE 1 PUFF: 200; 25 POWDER RESPIRATORY (INHALATION) at 08:51

## 2022-10-02 RX ADMIN — CHOLESTYRAMINE 4 G: 4 POWDER, FOR SUSPENSION ORAL at 19:44

## 2022-10-02 RX ADMIN — VANCOMYCIN HYDROCHLORIDE 125 MG: 125 CAPSULE ORAL at 08:50

## 2022-10-02 RX ADMIN — Medication 6 MG: at 21:28

## 2022-10-02 RX ADMIN — MIDODRINE HYDROCHLORIDE 10 MG: 5 TABLET ORAL at 17:00

## 2022-10-02 RX ADMIN — DICYCLOMINE HYDROCHLORIDE 10 MG: 10 CAPSULE ORAL at 17:00

## 2022-10-02 RX ADMIN — MIDODRINE HYDROCHLORIDE 10 MG: 5 TABLET ORAL at 11:39

## 2022-10-02 RX ADMIN — PANTOPRAZOLE SODIUM 40 MG: 40 TABLET, DELAYED RELEASE ORAL at 05:20

## 2022-10-02 RX ADMIN — CLOTRIMAZOLE: 0.01 CREAM TOPICAL at 17:25

## 2022-10-02 RX ADMIN — DICYCLOMINE HYDROCHLORIDE 10 MG: 10 CAPSULE ORAL at 21:28

## 2022-10-02 RX ADMIN — DICYCLOMINE HYDROCHLORIDE 10 MG: 10 CAPSULE ORAL at 08:50

## 2022-10-02 RX ADMIN — MEROPENEM 500 MG: 500 INJECTION, POWDER, FOR SOLUTION INTRAVENOUS at 14:57

## 2022-10-02 RX ADMIN — CHOLESTYRAMINE 4 G: 4 POWDER, FOR SUSPENSION ORAL at 08:50

## 2022-10-02 RX ADMIN — INSULIN LISPRO 1 UNITS: 100 INJECTION, SOLUTION INTRAVENOUS; SUBCUTANEOUS at 11:53

## 2022-10-02 RX ADMIN — VANCOMYCIN HYDROCHLORIDE 125 MG: 125 CAPSULE ORAL at 17:23

## 2022-10-02 RX ADMIN — NEPHROCAP 1 CAPSULE: 1 CAP ORAL at 17:24

## 2022-10-02 RX ADMIN — DICYCLOMINE HYDROCHLORIDE 10 MG: 10 CAPSULE ORAL at 11:39

## 2022-10-02 NOTE — PLAN OF CARE
Problem: Nutrition/Hydration-ADULT  Goal: Nutrient/Hydration intake appropriate for improving, restoring or maintaining nutritional needs  Description: Monitor and assess patient's nutrition/hydration status for malnutrition  Collaborate with interdisciplinary team and initiate plan and interventions as ordered  Monitor patient's weight and dietary intake as ordered or per policy  Utilize nutrition screening tool and intervene as necessary  Determine patient's food preferences and provide high-protein, high-caloric foods as appropriate       INTERVENTIONS:  - Monitor oral intake, urinary output, labs, and treatment plans  - Assess nutrition and hydration status and recommend course of action  - Evaluate amount of meals eaten  - Assist patient with eating if necessary   - Allow adequate time for meals  - Recommend/ encourage appropriate diets, oral nutritional supplements, and vitamin/mineral supplements  - Order, calculate, and assess calorie counts as needed  - Recommend, monitor, and adjust tube feedings and TPN/PPN based on assessed needs  - Assess need for intravenous fluids  - Provide specific nutrition/hydration education as appropriate  - Include patient/family/caregiver in decisions related to nutrition  Outcome: Progressing     Problem: GASTROINTESTINAL - ADULT  Goal: Minimal or absence of nausea and/or vomiting  Description: INTERVENTIONS:  - Administer IV fluids if ordered to ensure adequate hydration  - Maintain NPO status until nausea and vomiting are resolved  - Nasogastric tube if ordered  - Administer ordered antiemetic medications as needed  - Provide nonpharmacologic comfort measures as appropriate  - Advance diet as tolerated, if ordered  - Consider nutrition services referral to assist patient with adequate nutrition and appropriate food choices  Outcome: Progressing     Problem: PAIN - ADULT  Goal: Verbalizes/displays adequate comfort level or baseline comfort level  Description: Interventions:  - Encourage patient to monitor pain and request assistance  - Assess pain using appropriate pain scale  - Administer analgesics based on type and severity of pain and evaluate response  - Implement non-pharmacological measures as appropriate and evaluate response  - Consider cultural and social influences on pain and pain management  - Notify physician/advanced practitioner if interventions unsuccessful or patient reports new pain  Outcome: Progressing     Problem: INFECTION - ADULT  Goal: Absence or prevention of progression during hospitalization  Description: INTERVENTIONS:  - Assess and monitor for signs and symptoms of infection  - Monitor lab/diagnostic results  - Monitor all insertion sites, i e  indwelling lines, tubes, and drains  - Monitor endotracheal if appropriate and nasal secretions for changes in amount and color  - [place] appropriate cooling/warming therapies per order  - Administer medications as ordered  - Instruct and encourage patient and family to use good hand hygiene technique  - Identify and instruct in appropriate isolation precautions for identified infection/condition  Outcome: Progressing

## 2022-10-02 NOTE — ASSESSMENT & PLAN NOTE
Lab Results   Component Value Date    HGBA1C 6 3 (H) 08/05/2022       Recent Labs     10/01/22  0711 10/01/22  1117 10/01/22  1639 10/01/22  2201   POCGLU 164* 115 132 135       Blood Sugar Average: Last 72 hrs:  (P) 141 5   · Hold lantus  · SSI

## 2022-10-02 NOTE — ASSESSMENT & PLAN NOTE
· Baseline hgb 8  · Received IV venofer  · Trend H&H  · Transfuse for hgb <7  · Patient underwent EGD on 09/26 which showed One 3 mm polyp in the stomach removed with biopsy forceps  Large sliding hiatal hernia without Demian lesions     Biliary stents in place draining  Dilation of the stomach with large amount of retained   liquid upon entrance to the stomach   Suctioned out  Karin Rakes for element of   Gastroparesis  · Continue Protonix

## 2022-10-02 NOTE — QUICK NOTE
Bile cx grew VRE while Pt was on Meropenem and previously on Zosyn which could affect the cx results   He has improved following placement of cholecystotomy tube and without abx for the VRE so not entirely clear how significant is the VRE    - Cont Meropenem for now  - Will add Daptomycin to cover VRE

## 2022-10-02 NOTE — PROGRESS NOTES
[place]  Progress Note - Jaydon Bender 1950, 81 y o  male MRN: [id number]  Unit/Bed#: -01 Encounter: [id number]  Primary Care Provider: Alice Vidales MD   Date and time admitted to hospital: 9/12/2022  4:09 PM    Acute cholecystitis  Assessment & Plan  9/23 this morning patient started to develop some nausea and vomiting, vomiting noted to be dark in color, no dark stools noted in rectal tube  On Protonix  Most likely secondary to large hiatal hernia on previous EGD  9/24 rectal tube noted to have dark stools  N/V resolved, continues to have abdominal tenderness  Hg is 10 1  CT abd/pelvis- Distended gallbladder with cholelithiasis, wall thickening, and pericholecystic inflammatory change  There appear to be calculi extending into the cystic duct  Findings can be due to acute cholecystitis in the appropriate clinical setting  Consider gallbladder ultrasound  2  Previously seen inflammatory changes related to acute sigmoid diverticulitis are no longer visualized "  RUQ US- " Again seen is a distended gallbladder with gallbladder wall thickening up to 15 mm, also containing intraluminal air, gallstones, sludge  Sonographic Lentz's sign is negative "  Surgical follow-up noted  As per surgery patient is not a candidate for cholecystectomy at this time  They recommended cholecystomy tube placement  · HIDA scan showed-Nonvisualization of the gallbladder concerning for acute cholecystitis  · Continue IV antibiotics  · Patient  underwent cholecystomy tube placement by IR on September 29  · Follow-up culture results from cholecystomy procedure  · ID recommends continue antibiotics through weekend  · Patient is tolerating diet    Abnormal CT scan  Assessment & Plan  · CTAP-common bile duct stent with pneumobilia and air within gallblader  Distended gallbladder with cholelithiasis  · RUQ u/s distended gallbladder with air within stent in CBD similar to CT     · Surgery consulted  If concern for acute cholecystitis not surgical candidate would need per vanessa tube  · GI following  · ERCP 8/29/2022, Dr Jose Simmons, Kitty Arrington esophagus seen during EGD, biopsy obtained   ERCP performed with endoscopic mucosal resection of abnormal looking ampullary tissue, PD, CBD stent placed  · No s/s acute cholecystitis monitor   · Surgical and GI follow-up noted    Acute diverticulitis  Assessment & Plan  · 2nd episode in one month (tx with 10 days antibx 8/2-8/11)  · CTAP acute diverticulitis  · GI following  · 7/25 Colonoscopy--2 polyps removed, Mild diverticula in the descending colon and sigmoid colon, Small, internal hemorrhoids  · 9/22 finished 10 day course of zosyn  · Questran started 9/17   · Imodium PRN  · Bentyl added 9/18 by GI  · advance diet per gi recommendations  · Gi suspects  multifactorial probably were related to the antibiotics that he is receiving for the diverticulitis as well as a chemo effect from his myeloma  May need to hold chemo on discharge for a period of time  Treatment at this point is supportive  · Monitor rectal tube output, Stool appears much darker today  · After discontinuation of Zosyn the following day on 09/23 patient started to develop leukocytosis with elevated procalcitonin  · Consulted Infectious Disease  · C diff is negative  · 9/24 Started patient on p o  Vancomycin and Zosyn  · ID switched patient to [address] [place] [address]  Continue Merrem and oral vancomycin    Anemia  Assessment & Plan  · Baseline hgb 8  · Received IV venofer  · Trend H&H  · Transfuse for hgb <7  · Patient underwent EGD on 09/26 which showed One 3 mm polyp in the stomach removed with biopsy forceps  Large sliding hiatal hernia without Demian lesions     Biliary stents in place draining  Dilation of the stomach with large amount of retained   liquid upon entrance to the stomach   Suctioned out  [employer] for element of   Gastroparesis  · Continue Protonix      Ambulatory dysfunction  Assessment & Plan  · 2/2 L AKA  · PT/OT    Multiple myeloma ([facility] )  Assessment & Plan  · Follows with Dr Arriaga Severe  · On Velcade and decadron last tx 9/6  · Free light chains ordered outpt f/u with oncology    Diabetes mellitus, type 2 [facility])  Assessment & Plan  Lab Results   Component Value Date    HGBA1C 6 3 (H) 08/05/2022       Recent Labs     10/01/22  0711 10/01/22  1117 10/01/22  1639 10/01/22  2201   POCGLU 164* 115 132 135       Blood Sugar Average: Last 72 hrs:  (P) 141 5   · Hold lantus  · SSI    NALLELY (obstructive sleep apnea)  Assessment & Plan  · Refuses cpap    Chronic combined systolic and diastolic congestive heart failure (HCC)  Assessment & Plan  Wt Readings from Last 3 Encounters:   10/02/22 117 kg (257 lb 15 oz)   09/06/22 117 kg (257 lb 15 oz)   08/30/22 118 kg (260 lb)     · EF 45% decreased RV fx  · I/O  · Daily weight  · Currently being managed with HD  · Demadex discontinued due to low bp and minimal urine output  Morbid obesity ([place] [address] )  Assessment & Plan  · Dietary education    Chronic atrial fibrillation [facility])  Assessment & Plan  · 9/18 Bradycardia with Junctional escape beats 20's overnight with hypotension  · No rate control meds d/c in august due to bradycardia  · TSH 0 4  · Cards consulted recommending CPAP HS and occurs with apnea episodes   · Patient is scheduled for cholecystomy tube placement today  · Continue on Coumadin 5 mg daily    INR this morning is 1 46  · Trend PT INR        * DORA (acute kidney injury) ([facility] )  Assessment & Plan  · DORA on CKD 3  · Most recent d/c creat 3-3 7 prior in 2 range  · CT no hydro, nonobstructing calculi  · UA neg  · Nephrology following  · 9/15 R tunneled HD cath placed  · 9/15 and 9/16 IHD  · Monitor for renal recovery remains oliguric  · S/p IHD on/ 9/19   · Currently on m/w/f schedule with no signs of renal recovery  · Temporary to PermCath conversion 09/21/2022  · No signs of renal recovery  · Continue dialysis as per Nephrology  · Nephrology on board        Labs & Imaging: I have personally reviewed pertinent reports  VTE Prophylaxis: in place  Code Status:   Level 1 - Full Code    Patient Centered Rounds: I have performed bedside rounds with nursing staff today  Discussions with Specialists or Other Care Team Provider: GI    Education and Discussions with Family / Patient:  Patient declined family update    Current Length of Stay: 20 day(s)    Current Patient Status: Inpatient   Certification Statement: The patient will continue to require additional inpatient hospital stay due to see my assessment and plan  Subjective:   Patient is seen and examined at bedside  Denies any new complaints  Abdominal pain is well controlled  No nausea, vomiting  All other ROS are negative  Objective:    Vitals: Blood pressure 125/67, pulse (!) 54, temperature 97 5 °F (36 4 °C), resp  rate 18, height 6' 3" (1 905 m), weight 117 kg (257 lb 15 oz), SpO2 97 %  ,Body mass index is 32 24 kg/m²  SPO2 RA Rest    Flowsheet Row ED to Hosp-Admission (Current) from 9/12/2022 in [address] Med Surg Unit   SpO2 97 %   SpO2 Activity At Rest   O2 Device None (Room air)   O2 Flow Rate --        I&O:     Intake/Output Summary (Last 24 hours) at 10/2/2022 0749  Last data filed at 10/2/2022 0501  Gross per 24 hour   Intake 120 ml   Output 55 ml   Net 65 ml       Physical Exam:    General- Alert, lying comfortably in bed  Not in any acute distress  Neck- Supple, No JVD  CVS- regular, S1 and S2 normal  Chest- Bilateral Air entry, No rhochi, crackles or wheezing present  Abdomen- soft, cholecystomy tube in place, not distended, no guarding or rigidity, BS+  Extremities-  No pedal edema, No calf tenderness  CNS-   Alert, awake and orientedx3  No focal deficits present      Invasive Devices  Report    Central Venous Catheter Line  Duration           CVC Central Lines 09/22/22 Double 9 days          Peripheral Intravenous Line  Duration           Peripheral IV 10/01/22 Dorsal (posterior); Right Forearm 1 day          Hemodialysis Catheter  Duration           HD Permanent Double Catheter 9 days          Drain  Duration           Cholecystostomy Tube RUQ 2 days                      Social History  reviewed  Family History   Problem Relation Age of Onset    Other Mother         CARDIAC DISORDER     Diabetes Mother     Cancer Father     Other Family         CARDIAC DISORDER     Diabetes Family     Cancer Family     Mental illness Family     Kidney disease Sister     Diabetes Sister     reviewed    Meds:  Current Facility-Administered Medications   Medication Dose Route Frequency Provider Last Rate Last Admin    acetaminophen (TYLENOL) tablet 650 mg  650 mg Oral Q6H PRN Mirta Stubbs MD        albuterol (PROVENTIL HFA,VENTOLIN HFA) inhaler 2 puff  2 puff Inhalation Q6H PRN Mirta Stubbs MD        allopurinol (ZYLOPRIM) tablet 300 mg  300 mg Oral Daily Mirta Stubbs MD   300 mg at 10/01/22 0902    atorvastatin (LIPITOR) tablet 40 mg  40 mg Oral After Dariana Prasad MD   40 mg at 10/01/22 1717    b complex-vitamin C-folic acid (NEPHROCAPS) capsule 1 capsule  1 capsule Oral Daily With Diego Rossi MD   1 capsule at 10/01/22 1717    cholestyramine sugar free (QUESTRAN LIGHT) packet 4 g  4 g Oral BID Mirta Stubbs MD   4 g at 10/01/22 2213    clotrimazole (LOTRIMIN) 1 % cream   Topical BID Mirta Stubbs MD   Given at 10/01/22 0902    dicyclomine (BENTYL) capsule 10 mg  10 mg Oral 4x Daily (AC & HS) Mirta Stubbs MD   10 mg at 10/01/22 2213    diphenoxylate-atropine (LOMOTIL) 2 5-0 025 mg per tablet 1 tablet  1 tablet Oral Q12H NICOLA George   1 tablet at 10/01/22 2213    fluticasone-vilanterol (BREO ELLIPTA) 200-25 MCG/INH inhaler 1 puff  1 puff Inhalation Daily Mirta Stubbs MD   1 puff at 10/01/22 0903    insulin lispro (HumaLOG) 100 units/mL subcutaneous injection 1-6 Units  1-6 Units Subcutaneous TID With Meals Mirta Stubbs MD   1 Units at 10/01/22 0903    melatonin tablet 6 mg  6 mg Oral HS Radha Kelly MD   6 mg at 10/01/22 2213    meropenem (MERREM) 500 mg in sodium chloride 0 9 % 50 mL IVPB  500 mg Intravenous Q24H Radha Kelly  mL/hr at 10/01/22 1424 500 mg at 10/01/22 1424    midodrine (PROAMATINE) tablet 10 mg  10 mg Oral TID [facility] Radha Kelly MD   10 mg at 10/02/22 0743    ondansetron (ZOFRAN) injection 4 mg  4 mg Intravenous Q6H PRN Radha Kelly MD   4 mg at 09/26/22 1023    pantoprazole (PROTONIX) EC tablet 40 mg  40 mg Oral Early Morning Anushka Lanier MD   40 mg at 10/02/22 0520    trimethobenzamide (TIGAN) IM injection 200 mg  200 mg Intramuscular Q6H PRN Radha Kelly MD   200 mg at 09/23/22 1004    vancomycin (VANCOCIN) capsule 125 mg  125 mg Oral BID Radha Kelly MD   125 mg at 10/01/22 1717    warfarin (COUMADIN) tablet 5 mg  5 mg Oral Daily (warfarin) James Zamudio MD   5 mg at 10/01/22 1717      Medications Prior to Admission   Medication    acetaminophen (TYLENOL) 500 mg tablet    albuterol (PROVENTIL HFA,VENTOLIN HFA) 90 mcg/act inhaler    Alcohol Swabs (ALCOHOL PREP) 70 % PADS    allopurinol (ZYLOPRIM) 300 mg tablet    ammonium lactate (LAC-HYDRIN) 12 % lotion    atorvastatin (LIPITOR) 40 mg tablet    BD AUTOSHIELD DUO 30G X 5 MM MISC    BD INSULIN SYRINGE U/F 31G X 5/16" 0 5 ML MISC    BD PEN NEEDLE HILARIO U/F 32G X 4 MM MISC    bortezomib (VELCADE)    cholestyramine sugar free (QUESTRAN LIGHT) 4 g packet    clotrimazole (LOTRIMIN) 1 % cream    Easy Touch Safety Lancets 28G MISC    fluticasone (FLONASE) 50 mcg/act nasal spray    fluticasone-salmeterol (ADVAIR DISKUS, WIXELA INHUB) 250-50 mcg/dose inhaler    Insulin Glargine Solostar (Lantus SoloStar) 100 UNIT/ML SOPN    liraglutide (Victoza) injection    loperamide (IMODIUM) 2 mg capsule    multivitamin-minerals therapeutic (THERA-M)    NOVOLOG FLEXPEN 100 units/mL SOPN    nystatin (MYCOSTATIN) powder    omeprazole (PriLOSEC) 40 MG capsule    ondansetron (ZOFRAN) 4 mg tablet    potassium chloride (K-DUR,KLOR-CON) 20 mEq tablet    torsemide (DEMADEX) 20 mg tablet       Labs:  Results from last 7 days   Lab Units 10/02/22  0507 09/30/22  0620 09/29/22  0652 09/28/22  0458 09/27/22  0530   WBC Thousand/uL 6 40 7 53 7 13 7 33 7 03   HEMOGLOBIN g/dL 10 1* 9 4* 8 7* 8 2* 8 0*   HEMATOCRIT % 34 5* 31 0* 28 7* 27 5* 27 0*   PLATELETS Thousands/uL 436* 454* 345 318 232   NEUTROS PCT %  --   --  67 69 72   LYMPHS PCT %  --   --  16 16 9*   LYMPHO PCT % 22 19  --   --   --    MONOS PCT %  --   --  11 10 13*   MONO PCT % 11 10  --   --   --    EOS PCT % 5 3 3 3 4     Results from last 7 days   Lab Units 10/02/22  0507 09/30/22 0620 09/29/22  0652 09/28/22  0458 09/27/22  0530   POTASSIUM mmol/L 3 6 4 0 3 7 3 5 3 3*   CHLORIDE mmol/L 100 98 99 97 97   CO2 mmol/L 30 28 28 24 28   BUN mg/dL 17 24 19 38* 26*   CREATININE mg/dL 6 15* 6 49* 4 79* 7 81* 5 88*   CALCIUM mg/dL 9 4 9 6 9 0 8 8 8 8   ALK PHOS U/L  --  183*  --  145* 141*   ALT U/L  --  14  --  12 11*   AST U/L  --  37  --  18 21     Lab Results   Component Value Date    TROPONINI 5 19 (H) 06/27/2020    TROPONINI 5 85 (H) 06/27/2020    TROPONINI 6 35 (H) 06/27/2020    CKMB 4 1 06/25/2020    CKTOTAL 271 06/25/2020    CKTOTAL 53 03/23/2018    CKTOTAL 55 10/08/2017     Results from last 7 days   Lab Units 10/02/22  0507 10/01/22  0457 09/30/22  0620   INR  1 46* 1 23* 1 14     Lab Results   Component Value Date    BLOODCX No Growth After 5 Days  09/25/2022    BLOODCX No Growth After 5 Days  09/25/2022    BLOODCX No Growth After 5 Days  09/12/2022    BLOODCX No Growth After 5 Days   09/12/2022    URINECX >100,000 cfu/ml Escherichia coli (A) 12/05/2019    WOUNDCULT 4+ Growth of Proteus mirabilis (A) 07/25/2019    WOUNDCULT 4+ Growth of  07/25/2019    WOUNDCULT (A) 07/25/2019     4+ Growth of Methicillin Resistant Staphylococcus aureus    WOUNDCULT 1+ Growth of Proteus mirabilis (A) 07/25/2019    WOUNDCULT 4+ Growth of  07/25/2019    SPUTUMCULTUR 4+ Growth of 07/11/2020    SPUTUMCULTUR 1+ Growth of Aspergillus fumigatus (A) 07/11/2020         Imaging:  Results for orders placed during the hospital encounter of 09/12/22    XR chest portable    Narrative  CHEST    INDICATION:   fever  COMPARISON:  9/12/2022    EXAM PERFORMED/VIEWS:  XR CHEST PORTABLE      FINDINGS:  There is a right-sided dialysis catheter, placed since the previous study  Heart shadow is enlarged but unchanged from prior exam  There is a large hiatal hernia  There is mild bibasilar atelectasis  Evaluation of the left lung bases otherwise limited by enlarged heart and hernia  No pneumothorax or definite pleural effusion  Osseous structures appear within normal limits for patient age  Impression  Limited evaluation of the left base  Mild bibasilar atelectasis, cardiomegaly, and large hiatal hernia  Workstation performed: [id number]    Results for orders placed during the hospital encounter of 10/04/21    XR chest pa & lateral    Narrative  CHEST    INDICATION:   follow up lung consolidations  COMPARISON:  CTA chest/abdomen/pelvis 10/4/2021  Chest radiograph 7/8/2020  EXAM PERFORMED/VIEWS:  XR CHEST PA & LATERAL      FINDINGS:    Heart shadow is enlarged but unchanged from prior exam     Again seen is dense left lower lobe/retrocardiac consolidation with ill-defined airspace opacities in the lingula and right lower lobe, similar to recent chest CT  Trace left pleural effusion  No pneumothorax  Osseous structures appear within normal limits for patient age  Impression  Stable left lower lobe/retrocardiac consolidation and lingular and right lower lobe ill-defined airspace opacities  Trace left pleural effusion          Workstation performed: [id number]      Last 24 Hours Medication List:   Current Facility-Administered Medications   Medication Dose Route Frequency Provider Last Rate    acetaminophen  650 mg Oral Q6H PRN Saintclair Skene, MD      albuterol  2 puff Inhalation Q6H PRN Lorri Valles MD      allopurinol  300 mg Oral Daily Lorri Valles MD      atorvastatin  40 mg Oral After Read MD Jillian      b complex-vitamin C-folic acid  1 capsule Oral Daily With Vishal Raines MD      cholestyramine sugar free  4 g Oral BID Lrori Valles MD      clotrimazole   Topical BID Lorri Valles MD      dicyclomine  10 mg Oral 4x Daily (AC & HS) Lorri Valles MD      diphenoxylate-atropine  1 tablet Oral Q12H NICOLA Dominguez      fluticasone-vilanterol  1 puff Inhalation Daily Lorri Valles MD      insulin lispro  1-6 Units Subcutaneous TID With Meals Lorri Valles MD      melatonin  6 mg Oral HS Lorri Valles MD      meropenem  500 mg Intravenous Q24H Lorri Valles  mg (10/01/22 1424)    midodrine  10 mg Oral TID AC Lorri Valles MD      ondansetron  4 mg Intravenous Q6H PRN Lorri Valles MD      pantoprazole  40 mg Oral Early Morning Ruma Guillory MD      trimethobenzamide  200 mg Intramuscular Q6H PRN Lorri Valles MD      vancomycin  125 mg Oral BID Lorri Valles MD      warfarin  5 mg Oral Daily (warfarin) Herminia Blas MD          Today, Patient Was Seen By: Herminia Blas    ** Please Note: Dictation voice to text software may have been used in the creation of this document   **

## 2022-10-02 NOTE — ASSESSMENT & PLAN NOTE
Wt Readings from Last 3 Encounters:   10/02/22 117 kg (257 lb 15 oz)   09/06/22 117 kg (257 lb 15 oz)   08/30/22 118 kg (260 lb)     · EF 45% decreased RV fx  · I/O  · Daily weight  · Currently being managed with HD  · Demadex discontinued due to low bp and minimal urine output

## 2022-10-02 NOTE — ASSESSMENT & PLAN NOTE
· 9/18 Bradycardia with Junctional escape beats 20's overnight with hypotension  · No rate control meds d/c in august due to bradycardia  · TSH 0 4  · Cards consulted recommending CPAP HS and occurs with apnea episodes   · Patient is scheduled for cholecystomy tube placement today  · Continue on Coumadin 5 mg daily    INR this morning is 1 46  · Trend PT INR

## 2022-10-02 NOTE — ASSESSMENT & PLAN NOTE
· CTAP-common bile duct stent with pneumobilia and air within gallblader  Distended gallbladder with cholelithiasis  · RUQ u/s distended gallbladder with air within stent in CBD similar to CT  · Surgery consulted  If concern for acute cholecystitis not surgical candidate would need per vanessa tube  · GI following  · ERCP 8/29/2022, Tong Smyth esophagus seen during EGD, biopsy obtained   ERCP performed with endoscopic mucosal resection of abnormal looking ampullary tissue, PD, CBD stent placed    · No s/s acute cholecystitis monitor   · Surgical and GI follow-up noted

## 2022-10-02 NOTE — ASSESSMENT & PLAN NOTE
· 2nd episode in one month (tx with 10 days antibx 8/2-8/11)  · CTAP acute diverticulitis  · GI following  · 7/25 Colonoscopy--2 polyps removed, Mild diverticula in the descending colon and sigmoid colon, Small, internal hemorrhoids  · 9/22 finished 10 day course of zosyn  · Questran started 9/17   · Imodium PRN  · Bentyl added 9/18 by GI  · advance diet per gi recommendations  · Gi suspects  multifactorial probably were related to the antibiotics that he is receiving for the diverticulitis as well as a chemo effect from his myeloma  May need to hold chemo on discharge for a period of time  Treatment at this point is supportive  · Monitor rectal tube output, Stool appears much darker today  · After discontinuation of Zosyn the following day on 09/23 patient started to develop leukocytosis with elevated procalcitonin  · Consulted Infectious Disease  · C diff is negative  · 9/24 Started patient on p o  Vancomycin and Zosyn  · ID switched patient to [address] [place] [address]    Continue Merrem and oral vancomycin

## 2022-10-02 NOTE — ASSESSMENT & PLAN NOTE
9/23 this morning patient started to develop some nausea and vomiting, vomiting noted to be dark in color, no dark stools noted in rectal tube  On Protonix  Most likely secondary to large hiatal hernia on previous EGD  9/24 rectal tube noted to have dark stools  N/V resolved, continues to have abdominal tenderness  Hg is 10 1  CT abd/pelvis- Distended gallbladder with cholelithiasis, wall thickening, and pericholecystic inflammatory change  There appear to be calculi extending into the cystic duct  Findings can be due to acute cholecystitis in the appropriate clinical setting  Consider gallbladder ultrasound  2  Previously seen inflammatory changes related to acute sigmoid diverticulitis are no longer visualized "  RUQ US- " Again seen is a distended gallbladder with gallbladder wall thickening up to 15 mm, also containing intraluminal air, gallstones, sludge  Sonographic Lentz's sign is negative "  Surgical follow-up noted  As per surgery patient is not a candidate for cholecystectomy at this time  They recommended cholecystomy tube placement  · HIDA scan showed-Nonvisualization of the gallbladder concerning for acute cholecystitis    · Continue IV antibiotics  · Patient  underwent cholecystomy tube placement by IR on September 29  · Follow-up culture results from cholecystomy procedure  · ID recommends continue antibiotics through weekend  · Patient is tolerating diet

## 2022-10-03 ENCOUNTER — APPOINTMENT (INPATIENT)
Dept: DIALYSIS | Facility: HOSPITAL | Age: 72
DRG: 674 | End: 2022-10-03
Attending: INTERNAL MEDICINE
Payer: COMMERCIAL

## 2022-10-03 LAB
ALBUMIN SERPL BCP-MCNC: 2.4 G/DL (ref 3.5–5)
ALP SERPL-CCNC: 207 U/L (ref 46–116)
ALT SERPL W P-5'-P-CCNC: 19 U/L (ref 12–78)
ANION GAP SERPL CALCULATED.3IONS-SCNC: 14 MMOL/L (ref 4–13)
ANISOCYTOSIS BLD QL SMEAR: PRESENT
AST SERPL W P-5'-P-CCNC: 41 U/L (ref 5–45)
BASOPHILS # BLD MANUAL: 0 THOUSAND/UL (ref 0–0.1)
BASOPHILS NFR MAR MANUAL: 0 % (ref 0–1)
BILIRUB SERPL-MCNC: 0.4 MG/DL (ref 0.2–1)
BUN SERPL-MCNC: 20 MG/DL (ref 5–25)
CALCIUM ALBUM COR SERPL-MCNC: 10.3 MG/DL (ref 8.3–10.1)
CALCIUM SERPL-MCNC: 9 MG/DL (ref 8.3–10.1)
CHLORIDE SERPL-SCNC: 102 MMOL/L (ref 96–108)
CO2 SERPL-SCNC: 25 MMOL/L (ref 21–32)
CREAT SERPL-MCNC: 7.6 MG/DL (ref 0.6–1.3)
EOSINOPHIL # BLD MANUAL: 0.51 THOUSAND/UL (ref 0–0.4)
EOSINOPHIL NFR BLD MANUAL: 7 % (ref 0–6)
ERYTHROCYTE [DISTWIDTH] IN BLOOD BY AUTOMATED COUNT: 17.6 % (ref 11.6–15.1)
GFR SERPL CREATININE-BSD FRML MDRD: 6 ML/MIN/1.73SQ M
GLUCOSE SERPL-MCNC: 130 MG/DL (ref 65–140)
GLUCOSE SERPL-MCNC: 139 MG/DL (ref 65–140)
GLUCOSE SERPL-MCNC: 157 MG/DL (ref 65–140)
GLUCOSE SERPL-MCNC: 173 MG/DL (ref 65–140)
GLUCOSE SERPL-MCNC: 196 MG/DL (ref 65–140)
HCT VFR BLD AUTO: 33.1 % (ref 36.5–49.3)
HGB BLD-MCNC: 9.7 G/DL (ref 12–17)
INR PPP: 1.85 (ref 0.84–1.19)
LYMPHOCYTES # BLD AUTO: 1.68 THOUSAND/UL (ref 0.6–4.47)
LYMPHOCYTES # BLD AUTO: 23 % (ref 14–44)
MAGNESIUM SERPL-MCNC: 1.8 MG/DL (ref 1.6–2.6)
MCH RBC QN AUTO: 25.7 PG (ref 26.8–34.3)
MCHC RBC AUTO-ENTMCNC: 29.3 G/DL (ref 31.4–37.4)
MCV RBC AUTO: 88 FL (ref 82–98)
MONOCYTES # BLD AUTO: 0.8 THOUSAND/UL (ref 0–1.22)
MONOCYTES NFR BLD: 11 % (ref 4–12)
NEUTROPHILS # BLD MANUAL: 4.31 THOUSAND/UL (ref 1.85–7.62)
NEUTS BAND NFR BLD MANUAL: 1 % (ref 0–8)
NEUTS SEG NFR BLD AUTO: 58 % (ref 43–75)
NRBC BLD AUTO-RTO: 2 /100 WBC (ref 0–2)
PLATELET # BLD AUTO: 429 THOUSANDS/UL (ref 149–390)
PLATELET BLD QL SMEAR: ABNORMAL
PMV BLD AUTO: 9.4 FL (ref 8.9–12.7)
POLYCHROMASIA BLD QL SMEAR: PRESENT
POTASSIUM SERPL-SCNC: 3.2 MMOL/L (ref 3.5–5.3)
PROT SERPL-MCNC: 6.8 G/DL (ref 6.4–8.4)
PROTHROMBIN TIME: 22.4 SECONDS (ref 11.6–14.5)
RBC # BLD AUTO: 3.78 MILLION/UL (ref 3.88–5.62)
SODIUM SERPL-SCNC: 141 MMOL/L (ref 135–147)
WBC # BLD AUTO: 7.3 THOUSAND/UL (ref 4.31–10.16)

## 2022-10-03 PROCEDURE — 85007 BL SMEAR W/DIFF WBC COUNT: CPT | Performed by: INTERNAL MEDICINE

## 2022-10-03 PROCEDURE — 82948 REAGENT STRIP/BLOOD GLUCOSE: CPT

## 2022-10-03 PROCEDURE — 80053 COMPREHEN METABOLIC PANEL: CPT | Performed by: INTERNAL MEDICINE

## 2022-10-03 PROCEDURE — 99232 SBSQ HOSP IP/OBS MODERATE 35: CPT | Performed by: INTERNAL MEDICINE

## 2022-10-03 PROCEDURE — 90935 HEMODIALYSIS ONE EVALUATION: CPT | Performed by: INTERNAL MEDICINE

## 2022-10-03 PROCEDURE — 88305 TISSUE EXAM BY PATHOLOGIST: CPT | Performed by: PATHOLOGY

## 2022-10-03 PROCEDURE — 85610 PROTHROMBIN TIME: CPT | Performed by: INTERNAL MEDICINE

## 2022-10-03 PROCEDURE — 83735 ASSAY OF MAGNESIUM: CPT | Performed by: NURSE PRACTITIONER

## 2022-10-03 PROCEDURE — 85027 COMPLETE CBC AUTOMATED: CPT | Performed by: INTERNAL MEDICINE

## 2022-10-03 PROCEDURE — 88342 IMHCHEM/IMCYTCHM 1ST ANTB: CPT | Performed by: PATHOLOGY

## 2022-10-03 RX ORDER — MIDODRINE HYDROCHLORIDE 5 MG/1
5 TABLET ORAL DAILY PRN
Status: DISCONTINUED | OUTPATIENT
Start: 2022-10-03 | End: 2022-10-04 | Stop reason: HOSPADM

## 2022-10-03 RX ADMIN — ALLOPURINOL 300 MG: 300 TABLET ORAL at 14:03

## 2022-10-03 RX ADMIN — VANCOMYCIN HYDROCHLORIDE 125 MG: 125 CAPSULE ORAL at 14:03

## 2022-10-03 RX ADMIN — DIPHENOXYLATE HYDROCHLORIDE AND ATROPINE SULFATE 1 TABLET: 2.5; .025 TABLET ORAL at 22:45

## 2022-10-03 RX ADMIN — CLOTRIMAZOLE 1 APPLICATION: 0.01 CREAM TOPICAL at 17:08

## 2022-10-03 RX ADMIN — NEPHROCAP 1 CAPSULE: 1 CAP ORAL at 17:06

## 2022-10-03 RX ADMIN — DAPTOMYCIN 400 MG: 500 INJECTION, POWDER, LYOPHILIZED, FOR SOLUTION INTRAVENOUS at 16:31

## 2022-10-03 RX ADMIN — ATORVASTATIN CALCIUM 40 MG: 40 TABLET, FILM COATED ORAL at 17:08

## 2022-10-03 RX ADMIN — INSULIN LISPRO 1 UNITS: 100 INJECTION, SOLUTION INTRAVENOUS; SUBCUTANEOUS at 08:55

## 2022-10-03 RX ADMIN — MEROPENEM 500 MG: 500 INJECTION, POWDER, FOR SOLUTION INTRAVENOUS at 17:10

## 2022-10-03 RX ADMIN — WARFARIN SODIUM 5 MG: 5 TABLET ORAL at 17:06

## 2022-10-03 RX ADMIN — Medication 6 MG: at 22:45

## 2022-10-03 RX ADMIN — MIDODRINE HYDROCHLORIDE 10 MG: 5 TABLET ORAL at 14:03

## 2022-10-03 RX ADMIN — MIDODRINE HYDROCHLORIDE 10 MG: 5 TABLET ORAL at 09:33

## 2022-10-03 RX ADMIN — CHOLESTYRAMINE 4 G: 4 POWDER, FOR SUSPENSION ORAL at 20:25

## 2022-10-03 RX ADMIN — FLUTICASONE FUROATE AND VILANTEROL TRIFENATATE 1 PUFF: 200; 25 POWDER RESPIRATORY (INHALATION) at 12:10

## 2022-10-03 RX ADMIN — DICYCLOMINE HYDROCHLORIDE 10 MG: 10 CAPSULE ORAL at 17:07

## 2022-10-03 RX ADMIN — CLOTRIMAZOLE 1 APPLICATION: 0.01 CREAM TOPICAL at 14:05

## 2022-10-03 RX ADMIN — PANTOPRAZOLE SODIUM 40 MG: 40 TABLET, DELAYED RELEASE ORAL at 05:15

## 2022-10-03 RX ADMIN — DICYCLOMINE HYDROCHLORIDE 10 MG: 10 CAPSULE ORAL at 22:45

## 2022-10-03 RX ADMIN — VANCOMYCIN HYDROCHLORIDE 125 MG: 125 CAPSULE ORAL at 22:45

## 2022-10-03 RX ADMIN — DIPHENOXYLATE HYDROCHLORIDE AND ATROPINE SULFATE 1 TABLET: 2.5; .025 TABLET ORAL at 14:04

## 2022-10-03 RX ADMIN — INSULIN LISPRO 2 UNITS: 100 INJECTION, SOLUTION INTRAVENOUS; SUBCUTANEOUS at 12:10

## 2022-10-03 RX ADMIN — DIPHENOXYLATE HYDROCHLORIDE AND ATROPINE SULFATE 1 TABLET: 2.5; .025 TABLET ORAL at 00:05

## 2022-10-03 NOTE — CASE MANAGEMENT
Case Management Progress Note    Patient name Julieth Dumont  Location /-81 MRN [id number]  : 1950 Date 10/3/2022       LOS (days): 21  Geometric Mean LOS (GMLOS) (days): 6 00  Days to GMLOS:-14 9        OBJECTIVE:        Current admission status: Inpatient  Preferred Pharmacy:   [facility] [place]  [address] [place] [address]  Phone: [phone] Fax: [phone]    Primary Care Provider: Joe Christopher MD    [facility]  Secondary Insurance:     PROGRESS NOTE:  Received call from Bessy at [facility] dialysis [phone], given update re: discharge date

## 2022-10-03 NOTE — PROGRESS NOTES
[place]  Progress Note - Mario Parr 1950, 81 y o  male MRN: [id number]  Unit/Bed#: -01 Encounter: [id number]  Primary Care Provider: Miguel Cervantes MD   Date and time admitted to hospital: 9/12/2022  4:09 PM    Acute cholecystitis  Assessment & Plan  9/23 this morning patient started to develop some nausea and vomiting, vomiting noted to be dark in color, no dark stools noted in rectal tube  On Protonix  Most likely secondary to large hiatal hernia on previous EGD  9/24 rectal tube noted to have dark stools  N/V resolved, continues to have abdominal tenderness  Hg is 9 7  Rectal tube was removed on 10/01  CT abd/pelvis- Distended gallbladder with cholelithiasis, wall thickening, and pericholecystic inflammatory change  There appear to be calculi extending into the cystic duct  Findings can be due to acute cholecystitis in the appropriate clinical setting  Consider gallbladder ultrasound  2  Previously seen inflammatory changes related to acute sigmoid diverticulitis are no longer visualized "  RUQ US- " Again seen is a distended gallbladder with gallbladder wall thickening up to 15 mm, also containing intraluminal air, gallstones, sludge  Sonographic Lentz's sign is negative "  Surgical follow-up noted  As per surgery patient is not a candidate for cholecystectomy at this time  They recommended cholecystomy tube placement  · HIDA scan showed-Nonvisualization of the gallbladder concerning for acute cholecystitis  · Continue IV antibiotics per ID  · Patient  underwent cholecystomy tube placement by IR on September 29  · Follow-up culture results from cholecystomy procedure  · Cultures from cholecystomy tube grew VRE  · Infectious Disease added daptomycin  Continue on Merrem and daptomycin and oral vancomycin  · Patient is tolerating diet    Abnormal CT scan  Assessment & Plan  · CTAP-common bile duct stent with pneumobilia and air within gallblader   Distended gallbladder with cholelithiasis  · RUQ u/s distended gallbladder with air within stent in CBD similar to CT  · Surgery consulted  If concern for acute cholecystitis not surgical candidate would need per vanessa tube  · GI following  · ERCP 8/29/2022, Dr Yelena David, Alexandra Escalera esophagus seen during EGD, biopsy obtained   ERCP performed with endoscopic mucosal resection of abnormal looking ampullary tissue, PD, CBD stent placed  · No s/s acute cholecystitis monitor   · Surgical and GI follow-up noted    Acute diverticulitis  Assessment & Plan  · 2nd episode in one month (tx with 10 days antibx 8/2-8/11)  · CTAP acute diverticulitis  · GI following  · 7/25 Colonoscopy--2 polyps removed, Mild diverticula in the descending colon and sigmoid colon, Small, internal hemorrhoids  · 9/22 finished 10 day course of zosyn  · Questran started 9/17   · Imodium PRN  · Bentyl added 9/18 by GI  · advance diet per gi recommendations  · Gi suspects  multifactorial probably were related to the antibiotics that he is receiving for the diverticulitis as well as a chemo effect from his myeloma  May need to hold chemo on discharge for a period of time  Treatment at this point is supportive  · Rectal tube was discontinued  · After discontinuation of Zosyn the following day on 09/23 patient started to develop leukocytosis with elevated procalcitonin  · Consulted Infectious Disease  · C diff is negative  · 9/24 Started patient on p o  Vancomycin and Zosyn  · ID switched patient to [address] [place] [address]  Continue Merrem and oral vancomycin  · Id added daptomycin    Anemia  Assessment & Plan  · Baseline hgb 8  · Received IV venofer  · Trend H&H  · Transfuse for hgb <7  · Patient underwent EGD on 09/26 which showed One 3 mm polyp in the stomach removed with biopsy forceps  Large sliding hiatal hernia without Demian lesions     Biliary stents in place draining  Dilation of the stomach with large amount of retained   liquid upon entrance to the stomach   Suctioned out  [facility] for element of   Gastroparesis  · Continue Protonix      Ambulatory dysfunction  Assessment & Plan  · 2/2 L AKA  · PT/OT    Multiple myeloma ([facility] [address] )  Assessment & Plan  · Follows with Dr Kwabena Sultana  · On Velcade and decadron last tx 9/6  · Free light chains ordered outpt f/u with oncology    Diabetes mellitus, type 2 [facility])  Assessment & Plan  Lab Results   Component Value Date    HGBA1C 6 3 (H) 08/05/2022       Recent Labs     10/02/22  0811 10/02/22  1133 10/02/22  1557 10/02/22  2038   POCGLU 155* 174* 117 144*       Blood Sugar Average: Last 72 hrs:  (P) 090 [id number]   · Hold lantus  · SSI    NALLELY (obstructive sleep apnea)  Assessment & Plan  · Refuses cpap    Chronic combined systolic and diastolic congestive heart failure (HCC)  Assessment & Plan  Wt Readings from Last 3 Encounters:   10/03/22 118 kg (260 lb 5 8 oz)   09/06/22 117 kg (257 lb 15 oz)   08/30/22 118 kg (260 lb)     · EF 45% decreased RV fx  · I/O  · Daily weight  · Currently being managed with HD  · Demadex discontinued due to low bp and minimal urine output  Morbid obesity ([facility] )  Assessment & Plan  · Dietary education    Chronic atrial fibrillation [facility])  Assessment & Plan  · 9/18 Bradycardia with Junctional escape beats 20's overnight with hypotension  · No rate control meds d/c in august due to bradycardia  · TSH 0 4  · Cards consulted recommending CPAP HS and occurs with apnea episodes   · Patient is scheduled for cholecystomy tube placement today  · Continue on Coumadin 5 mg daily    INR this morning is 1 85  · Trend PT INR        * DORA (acute kidney injury) ([facility] [address] )  Assessment & Plan  · DORA on CKD 3  · Most recent d/c creat 3-3 7 prior in 2 range  · CT no hydro, nonobstructing calculi  · UA neg  · Nephrology following  · 9/15 R tunneled HD cath placed  · 9/15 and 9/16 IHD  · Monitor for renal recovery remains oliguric  · S/p IHD on/ 9/19   · Currently on m/w/f schedule with no signs of renal recovery  · Temporary to PermCath conversion 09/21/2022  · No signs of renal recovery  · Continue dialysis as per Nephrology  · Nephrology on board      Labs & Imaging: I have personally reviewed pertinent reports  VTE Prophylaxis: in place  Code Status:   Level 1 - Full Code    Patient Centered Rounds: I have performed bedside rounds with nursing staff today  Discussions with Specialists or Other Care Team Provider:  Nephrology, GI    Education and Discussions with Family / Patient:  Patient declined family update    Current Length of Stay: 21 day(s)    Current Patient Status: Inpatient   Certification Statement: The patient will continue to require additional inpatient hospital stay due to see my assessment and plan  Subjective:   Patient is seen and examined at bedside  Abdominal pain is improving  Denies any nausea, vomiting, shortness of breath  Afebrile  All other ROS are negative  Objective:    Vitals: Blood pressure 112/62, pulse 55, temperature 98 1 °F (36 7 °C), resp  rate 18, height 6' 3" (1 905 m), weight 118 kg (260 lb 5 8 oz), SpO2 97 %  ,Body mass index is 32 54 kg/m²  SPO2 RA Rest    Flowsheet Row ED to Hosp-Admission (Current) from 9/12/2022 in [address] Med Surg Unit   SpO2 97 %   SpO2 Activity At Rest   O2 Device None (Room air)   O2 Flow Rate --        I&O:     Intake/Output Summary (Last 24 hours) at 10/3/2022 0725  Last data filed at 10/3/2022 0532  Gross per 24 hour   Intake 1020 ml   Output 199 ml   Net 821 ml       Physical Exam:    General- Alert, lying comfortably in bed  Not in any acute distress  Neck- Supple, No JVD  CVS- regular, S1 and S2 normal  Chest- Bilateral Air entry, No rhochi, crackles or wheezing present  Abdomen- soft, minimal tenderness, cholecystomy tube in place, not distended, no guarding or rigidity, BS+  Extremities-  No pedal edema, No calf tenderness                         Normal ROM in all extremities  CNS-   Alert, awake and orientedx3   No focal deficits present  Invasive Devices  Report    Central Venous Catheter Line  Duration           CVC Central Lines 09/22/22 Double 10 days          Peripheral Intravenous Line  Duration           Peripheral IV 10/01/22 Dorsal (posterior); Right Forearm 2 days          Hemodialysis Catheter  Duration           HD Permanent Double Catheter 10 days          Drain  Duration           Cholecystostomy Tube RUQ 3 days                      Social History  reviewed  Family History   Problem Relation Age of Onset    Other Mother         CARDIAC DISORDER     Diabetes Mother     Cancer Father     Other Family         CARDIAC DISORDER     Diabetes Family     Cancer Family     Mental illness Family     Kidney disease Sister     Diabetes Sister     reviewed    Meds:  Current Facility-Administered Medications   Medication Dose Route Frequency Provider Last Rate Last Admin    acetaminophen (TYLENOL) tablet 650 mg  650 mg Oral Q6H PRN Edward Barba MD        albuterol (PROVENTIL HFA,VENTOLIN HFA) inhaler 2 puff  2 puff Inhalation Q6H PRN Edward Barba MD        allopurinol (ZYLOPRIM) tablet 300 mg  300 mg Oral Daily Edward Barba MD   300 mg at 10/02/22 0850    atorvastatin (LIPITOR) tablet 40 mg  40 mg Oral After Chi Son MD   40 mg at 10/02/22 1723    b complex-vitamin C-folic acid (NEPHROCAPS) capsule 1 capsule  1 capsule Oral Daily With Ashwin Herrera MD   1 capsule at 10/02/22 1724    cholestyramine sugar free (QUESTRAN LIGHT) packet 4 g  4 g Oral BID Edward Barba MD   4 g at 10/02/22 1944    clotrimazole (LOTRIMIN) 1 % cream   Topical BID Edward Barba MD   Given at 10/02/22 1725    DAPTOmycin (CUBICIN) 400 mg in sodium chloride 0 9 % 50 mL IVPB  4 mg/kg (Adjusted) Intravenous Weekly Modesto Wynne MD       [facility] ON 10/5/2022] DAPTOmycin (CUBICIN) 400 mg in sodium chloride 0 9 % 50 mL IVPB  4 mg/kg (Adjusted) Intravenous Weekly Modesto Wynne MD       [facility] ON 10/7/2022] DAPTOmycin (CUBICIN) 575 mg in sodium chloride 0 9 % 50 mL IVPB  6 mg/kg (Adjusted) Intravenous Weekly Radhames Powell MD        dicyclomine (BENTYL) capsule 10 mg  10 mg Oral 4x Daily (AC & HS) Stephan Ellis MD   10 mg at 10/02/22 2128    diphenoxylate-atropine (LOMOTIL) 2 5-0 025 mg per tablet 1 tablet  1 tablet Oral Q12H Jestine Shall, CRNP   1 tablet at 10/03/22 0005    fluticasone-vilanterol (BREO ELLIPTA) 200-25 MCG/INH inhaler 1 puff  1 puff Inhalation Daily Stephan Ellis MD   1 puff at 10/02/22 0851    insulin lispro (HumaLOG) 100 units/mL subcutaneous injection 1-6 Units  1-6 Units Subcutaneous TID With Meals Stephan Ellis MD   1 Units at 10/02/22 1153    melatonin tablet 6 mg  6 mg Oral HS Stephan Ellis MD   6 mg at 10/02/22 2128    meropenem (MERREM) 500 mg in sodium chloride 0 9 % 50 mL IVPB  500 mg Intravenous Q24H Stephan Ellis  mL/hr at 10/02/22 1457 500 mg at 10/02/22 1457    midodrine (PROAMATINE) tablet 10 mg  10 mg Oral TID [facility] [place] Stephan Ellis MD   10 mg at 10/02/22 1700    ondansetron (ZOFRAN) injection 4 mg  4 mg Intravenous Q6H PRN Stephan Ellis MD   4 mg at 09/26/22 1023    pantoprazole (PROTONIX) EC tablet 40 mg  40 mg Oral Early Morning Lizeth Rios MD   40 mg at 10/03/22 0515    trimethobenzamide (TIGAN) IM injection 200 mg  200 mg Intramuscular Q6H PRN Stephan Ellis MD   200 mg at 09/23/22 1004    vancomycin (VANCOCIN) capsule 125 mg  125 mg Oral BID Stephan Ellis MD   125 mg at 10/02/22 1723    warfarin (COUMADIN) tablet 5 mg  5 mg Oral Daily (warfarin) Idania Chen MD   5 mg at 10/02/22 1724      Medications Prior to Admission   Medication    acetaminophen (TYLENOL) 500 mg tablet    albuterol (PROVENTIL HFA,VENTOLIN HFA) 90 mcg/act inhaler    Alcohol Swabs (ALCOHOL PREP) 70 % PADS    allopurinol (ZYLOPRIM) 300 mg tablet    ammonium lactate (LAC-HYDRIN) 12 % lotion    atorvastatin (LIPITOR) 40 mg tablet    BD AUTOSHIELD DUO 30G X 5 MM MISC    BD INSULIN SYRINGE U/F 31G X 5/16" 0 5 ML MISC    BD PEN NEEDLE HILARIO U/F 32G X 4 MM MISC  bortezomib (VELCADE)    cholestyramine sugar free (QUESTRAN LIGHT) 4 g packet    clotrimazole (LOTRIMIN) 1 % cream    Easy Touch Safety Lancets 28G MISC    fluticasone (FLONASE) 50 mcg/act nasal spray    fluticasone-salmeterol (ADVAIR DISKUS, WIXELA INHUB) 250-50 mcg/dose inhaler    Insulin Glargine Solostar (Lantus SoloStar) 100 UNIT/ML SOPN    liraglutide (Victoza) injection    loperamide (IMODIUM) 2 mg capsule    multivitamin-minerals therapeutic (THERA-M)    NOVOLOG FLEXPEN 100 units/mL SOPN    nystatin (MYCOSTATIN) powder    omeprazole (PriLOSEC) 40 MG capsule    ondansetron (ZOFRAN) 4 mg tablet    potassium chloride (K-DUR,KLOR-CON) 20 mEq tablet    torsemide (DEMADEX) 20 mg tablet       Labs:  Results from last 7 days   Lab Units 10/03/22  0449 10/02/22  0507 09/30/22  0620 09/29/22  0652 09/28/22  0458 09/27/22  0530   WBC Thousand/uL 7 30 6 40 7 53 7 13 7 33 7 03   HEMOGLOBIN g/dL 9 7* 10 1* 9 4* 8 7* 8 2* 8 0*   HEMATOCRIT % 33 1* 34 5* 31 0* 28 7* 27 5* 27 0*   PLATELETS Thousands/uL 429* 436* 454* 345 318 232   NEUTROS PCT %  --   --   --  67 69 72   LYMPHS PCT %  --   --   --  16 16 9*   LYMPHO PCT % 23 22 19  --   --   --    MONOS PCT %  --   --   --  11 10 13*   MONO PCT % 11 11 10  --   --   --    EOS PCT % 7* 5 3 3 3 4     Results from last 7 days   Lab Units 10/03/22  0449 10/02/22  0507 09/30/22  0620 09/29/22  0652 09/28/22  0458   POTASSIUM mmol/L 3 2* 3 6 4 0   < > 3 5   CHLORIDE mmol/L 102 100 98   < > 97   CO2 mmol/L 25 30 28   < > 24   BUN mg/dL 20 17 24   < > 38*   CREATININE mg/dL 7 60* 6 15* 6 49*   < > 7 81*   CALCIUM mg/dL 9 0 9 4 9 6   < > 8 8   ALK PHOS U/L 207*  --  183*  --  145*   ALT U/L 19  --  14  --  12   AST U/L 41  --  37  --  18    < > = values in this interval not displayed       Lab Results   Component Value Date    TROPONINI 5 19 (H) 06/27/2020    TROPONINI 5 85 (H) 06/27/2020    TROPONINI 6 35 (H) 06/27/2020    CKMB 4 1 06/25/2020    CKTOTAL 271 06/25/2020    CKTOTAL 53 03/23/2018    CKTOTAL 55 10/08/2017     Results from last 7 days   Lab Units 10/03/22  0449 10/02/22  0507 10/01/22  0457   INR  1 85* 1 46* 1 23*     Lab Results   Component Value Date    BLOODCX No Growth After 5 Days  09/25/2022    BLOODCX No Growth After 5 Days  09/25/2022    BLOODCX No Growth After 5 Days  09/12/2022    BLOODCX No Growth After 5 Days  09/12/2022    URINECX >100,000 cfu/ml Escherichia coli (A) 12/05/2019    WOUNDCULT 4+ Growth of Proteus mirabilis (A) 07/25/2019    WOUNDCULT 4+ Growth of  07/25/2019    WOUNDCULT (A) 07/25/2019     4+ Growth of Methicillin Resistant Staphylococcus aureus    WOUNDCULT 1+ Growth of Proteus mirabilis (A) 07/25/2019    WOUNDCULT 4+ Growth of  07/25/2019    SPUTUMCULTUR 4+ Growth of  07/11/2020    SPUTUMCULTUR 1+ Growth of Aspergillus fumigatus (A) 07/11/2020         Imaging:  Results for orders placed during the hospital encounter of 09/12/22    XR chest portable    Narrative  CHEST    INDICATION:   fever  COMPARISON:  9/12/2022    EXAM PERFORMED/VIEWS:  XR CHEST PORTABLE      FINDINGS:  There is a right-sided dialysis catheter, placed since the previous study  Heart shadow is enlarged but unchanged from prior exam  There is a large hiatal hernia  There is mild bibasilar atelectasis  Evaluation of the left lung bases otherwise limited by enlarged heart and hernia  No pneumothorax or definite pleural effusion  Osseous structures appear within normal limits for patient age  Impression  Limited evaluation of the left base  Mild bibasilar atelectasis, cardiomegaly, and large hiatal hernia  Workstation performed: [id number]    Results for orders placed during the hospital encounter of 10/04/21    XR chest pa & lateral    Narrative  CHEST    INDICATION:   follow up lung consolidations  COMPARISON:  CTA chest/abdomen/pelvis 10/4/2021  Chest radiograph 7/8/2020      EXAM PERFORMED/VIEWS:  XR CHEST PA & LATERAL      FINDINGS:    Heart shadow is enlarged but unchanged from prior exam     Again seen is dense left lower lobe/retrocardiac consolidation with ill-defined airspace opacities in the lingula and right lower lobe, similar to recent chest CT  Trace left pleural effusion  No pneumothorax  Osseous structures appear within normal limits for patient age  Impression  Stable left lower lobe/retrocardiac consolidation and lingular and right lower lobe ill-defined airspace opacities  Trace left pleural effusion          Workstation performed: [id number]      Last 24 Hours Medication List:   Current Facility-Administered Medications   Medication Dose Route Frequency Provider Last Rate    acetaminophen  650 mg Oral Q6H PRN Divya Hoover MD      albuterol  2 puff Inhalation Q6H PRN Divya Hoover MD      allopurinol  300 mg Oral Daily Divya Hoover MD      atorvastatin  40 mg Oral After Analia Recinos MD      b complex-vitamin C-folic acid  1 capsule Oral Daily With Adrian Lutz MD      cholestyramine sugar free  4 g Oral BID Divya Hoover MD      clotrimazole   Topical BID Divya Hoover MD      DAPTOmycin  4 mg/kg (Adjusted) Intravenous Weekly MD Kwaku Casillas ON 10/5/2022] DAPTOmycin  4 mg/kg (Adjusted) Intravenous Weekly MD Kwaku Casillas ON 10/7/2022] DAPTOmycin  6 mg/kg (Adjusted) Intravenous Weekly Madhu Gutierrez MD      dicyclomine  10 mg Oral 4x Daily (AC & HS) Divya Hoover MD      diphenoxylate-atropine  1 tablet Oral Q12H NICOLA No      fluticasone-vilanterol  1 puff Inhalation Daily Divya Hoover MD      insulin lispro  1-6 Units Subcutaneous TID With Meals Divya Hoover MD      melatonin  6 mg Oral HS Divya Hoover MD      meropenem  500 mg Intravenous Q24H Divya Hoover  mg (10/02/22 1457)    midodrine  10 mg Oral TID Bennie Saucedo MD      ondansetron  4 mg Intravenous Q6H PRN Divya Hoover MD      pantoprazole  40 mg Oral Early Morning MD Kwaku Jernigan trimethobenzamide  200 mg Intramuscular Q6H PRN Chemo Smalls MD      vancomycin  125 mg Oral BID Chemo Smalls MD      warfarin  5 mg Oral Daily (warfarin) Sherry White MD          Today, Patient Was Seen By: Sherry White    ** Please Note: Dictation voice to text software may have been used in the creation of this document   **

## 2022-10-03 NOTE — ASSESSMENT & PLAN NOTE
Wt Readings from Last 3 Encounters:   10/03/22 118 kg (260 lb 5 8 oz)   09/06/22 117 kg (257 lb 15 oz)   08/30/22 118 kg (260 lb)     · EF 45% decreased RV fx  · I/O  · Daily weight  · Currently being managed with HD  · Demadex discontinued due to low bp and minimal urine output

## 2022-10-03 NOTE — PROGRESS NOTES
Progress note - Gastroenterology   Cathy Pop 81 y o  male MRN: [id number]  Unit/Bed#: -01 Encounter: [id number]    ASSESSMENT and PLAN       1  Diverticulitis -resolved  Admitted with abdominal pain, CT scan consistent with diverticulitis  Treated with Zosyn times 10 days     Repeat CT scan 9/24 showed resolution of sigmoid diverticulitis     2  Acute cholecystitis  Gallbladder distended with wall thickening and alaina cholecystic inflammatory changes, cystic duct calculi on CT scan  Denies abdominal pain but tender with palpation all quadrants  HIDA scan 9/28 shows nonvisualization of gallbladder consistent with acute cholecystitis  Cholecystostomy tube placed 9/29-draining bloody fluid, cultures grew VRE  -Surgery following and Infectious Disease following  -Continue IV antibiotics  per ID-continue meropenem and daptomycin added to cover VRE     2  Diarrhea  Developed diarrhea, stool negative for C diff x2  Likely multifactorial related to antibiotic use and current chemotherapy for multiple myeloma  Diarrhea improving-nursing reports soft to loose bowel movement overnight  -continue Questran b i d   -continue Lomotil q 12 hours-can increase to t i d  If diarrhea continued  -on vancomycin prophylaxis due to continued antibiotics     3  Nausea/vomiting  Has improved significantly -denies nausea and has had no further emesis for greater than 5 days  Repeat EGD  9/26 showed small gastric polyp which was excised, large sliding hiatal hernia without Demian lesions, stomach dilated with large amount of retained liquid, concern for gastroparesis      Improved after cholecystotomy tube  -advanced diet, Ensure supplement  -Reglan discontinued 9/29 due to hallucinations and confusion  -continue pantoprazole 40 mg b i d   -consider gastric emptying study after gallbladder decompressed    Chief Complaint   Patient presents with    Abnormal Lab     [facility] EMS [facility] for an elevated creatinine of 7 3  Reports yellow emesis for a few days  Patient has RUQ and RLQ abdominal pain, is currently getting chemo once a week for kidney cancer  SUBJECTIVE/HPI   Feels bloated this a m  No further nausea or vomiting and tolerating small amounts of renal diet  Diarrhea improved  Bile cultures grew VRE    BP (!) 104/48   Pulse (!) 53   Temp 98 1 °F (36 7 °C)   Resp 18   Ht 6' 3" (1 905 m)   Wt 118 kg (260 lb 5 8 oz)   SpO2 98%   BMI 32 54 kg/m²     PHYSICALEXAM  General appearance:  NAD, receiving dialysis this a m  Eyes:  no icterus   Head: Normocephalic, without obvious abnormality, atraumatic  Lungs: clear to auscultation bilaterally  Heart: regular rate and rhythm, S1, S2 normal, no murmur, click, rub or gallop  Abdomen:  Slightly distended but soft, mild diffuse pain with palpation all quadrants    Cholecystostomy tube continues to drain brownish bile liquid diarrhea improved-nursing reports 1 episode loose stool overnight  Extremities: extremities normal, atraumatic, no cyanosis or edema  Neurologic: Grossly normal    Lab Results   Component Value Date    GLUCOSE 64 (L) 08/01/2022    CALCIUM 9 0 10/03/2022     01/15/2018    K 3 2 (L) 10/03/2022    CO2 25 10/03/2022     10/03/2022    BUN 20 10/03/2022    CREATININE 7 60 (H) 10/03/2022     Lab Results   Component Value Date    WBC 7 30 10/03/2022    HGB 9 7 (L) 10/03/2022    HCT 33 1 (L) 10/03/2022    MCV 88 10/03/2022     (H) 10/03/2022     Lab Results   Component Value Date    ALT 19 10/03/2022    AST 41 10/03/2022     (H) 11/03/2019    ALKPHOS 207 (H) 10/03/2022    BILITOT 0 6 01/15/2018     No results found for: AMYLASE  Lab Results   Component Value Date    LIPASE 691 (H) 09/13/2022     Lab Results   Component Value Date    IRON 17 (L) 09/23/2022    TIBC 174 (L) 09/23/2022    FERRITIN 479 (H) 09/23/2022     Lab Results   Component Value Date    INR 1 85 (H) 10/03/2022

## 2022-10-03 NOTE — PLAN OF CARE
4 hours of hd tx completed  Pt had some hypotension early in the tx and the goal was lowered and a one time bolus of 100mL saline given  Hypotension returned in the last 30 minutes but BP's were okay after rinse back  Post-Dialysis RN Treatment Note    Blood Pressure:  Pre 111/55 mm/Hg  Post 115/77 mmHg   EDW  tbd kg    Weight:  Pre 116 8 kg   Post 116 3 kg   Mode of weight measurement: Bed Scale  Net Volume Removed  500 ml    Treatment duration 240 minutes    NS given  100mL for hypotension   Treatment shortened?  No   Medications given during Rx Midodrine 10mg   Estimated Kt/V  Not Applicable   Access type: Permacath/TDC   Access Issues: No    Report called to primary nurse   Yes      Problem: METABOLIC, FLUID AND ELECTROLYTES - ADULT  Goal: Electrolytes maintained within normal limits  Description: INTERVENTIONS:  - Monitor labs and assess patient for signs and symptoms of electrolyte imbalances  - Administer electrolyte replacement as ordered  - Monitor response to electrolyte replacements, including repeat lab results as appropriate  - Instruct patient on fluid and nutrition as appropriate  Outcome: Progressing  Goal: Fluid balance maintained  Description: INTERVENTIONS:  - Monitor labs   - Monitor I/O and WT  - Instruct patient on fluid and nutrition as appropriate  - Assess for signs & symptoms of volume excess or deficit  Outcome: Progressing

## 2022-10-03 NOTE — ASSESSMENT & PLAN NOTE
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

## 2022-10-03 NOTE — ASSESSMENT & PLAN NOTE
Lab Results   Component Value Date    HGBA1C 6 3 (H) 08/05/2022       Recent Labs     10/02/22  0811 10/02/22  1133 10/02/22  1557 10/02/22  2038   POCGLU 155* 174* 117 144*       Blood Sugar Average: Last 72 hrs:  (P) 336 [id number]   · Hold lantus  · SSI

## 2022-10-03 NOTE — ASSESSMENT & PLAN NOTE
· Follows with Dr Christine Engle  · On Velcade and decadron last tx 9/6  · Free light chains ordered outpt f/u with oncology

## 2022-10-03 NOTE — PLAN OF CARE
Problem: Potential for Falls  Goal: Patient will remain free of falls  Description: INTERVENTIONS:  - Educate patient/family on patient safety including physical limitations  - Instruct patient to call for assistance with activity   - Consult OT/PT to assist with strengthening/mobility   - Keep Call bell within reach  - Keep bed low and locked with side rails adjusted as appropriate  - Keep care items and personal belongings within reach  - Initiate and maintain comfort rounds  - Make Fall Risk Sign visible to staff  - Offer Toileting every 2 Hours, in advance of need  - Initiate/Maintain bed alarm  - Obtain necessary fall risk management equipment:   - Apply yellow socks and bracelet for high fall risk patients  - Consider moving patient to room near nurses station  Outcome: Progressing     Problem: MOBILITY - ADULT  Goal: Maintain or return to baseline ADL function  Description: INTERVENTIONS:  -  Assess patient's ability to carry out ADLs; assess patient's baseline for ADL function and identify physical deficits which impact ability to perform ADLs (bathing, care of mouth/teeth, toileting, grooming, dressing, etc )  - Assess/evaluate cause of self-care deficits   - Assess range of motion  - Assess patient's mobility; develop plan if impaired  - Assess patient's need for assistive devices and provide as appropriate  - Encourage maximum independence but intervene and supervise when necessary  - Involve family in performance of ADLs  - Assess for home care needs following discharge   - Consider OT consult to assist with ADL evaluation and planning for discharge  - Provide patient education as appropriate  Outcome: Progressing     Problem: Prexisting or High Potential for Compromised Skin Integrity  Goal: Skin integrity is maintained or improved  Description: INTERVENTIONS:  - Identify patients at risk for skin breakdown  - Assess and monitor skin integrity  - Assess and monitor nutrition and hydration status  - Monitor labs   - Assess for incontinence   - Turn and reposition patient  - Assist with mobility/ambulation  - Relieve pressure over bony prominences  - Avoid friction and shearing  - Provide appropriate hygiene as needed including keeping skin clean and dry  - Evaluate need for skin moisturizer/barrier cream  - Collaborate with interdisciplinary team   - Patient/family teaching  - Consider wound care consult   Outcome: Progressing     Problem: Nutrition/Hydration-ADULT  Goal: Nutrient/Hydration intake appropriate for improving, restoring or maintaining nutritional needs  Description: Monitor and assess patient's nutrition/hydration status for malnutrition  Collaborate with interdisciplinary team and initiate plan and interventions as ordered  Monitor patient's weight and dietary intake as ordered or per policy  Utilize nutrition screening tool and intervene as necessary  Determine patient's food preferences and provide high-protein, high-caloric foods as appropriate       INTERVENTIONS:  - Monitor oral intake, urinary output, labs, and treatment plans  - Assess nutrition and hydration status and recommend course of action  - Evaluate amount of meals eaten  - Assist patient with eating if necessary   - Allow adequate time for meals  - Recommend/ encourage appropriate diets, oral nutritional supplements, and vitamin/mineral supplements  - Order, calculate, and assess calorie counts as needed  - Recommend, monitor, and adjust tube feedings and TPN/PPN based on assessed needs  - Assess need for intravenous fluids  - Provide specific nutrition/hydration education as appropriate  - Include patient/family/caregiver in decisions related to nutrition  Outcome: Progressing     Problem: GASTROINTESTINAL - ADULT  Goal: Minimal or absence of nausea and/or vomiting  Description: INTERVENTIONS:  - Administer IV fluids if ordered to ensure adequate hydration  - Maintain NPO status until nausea and vomiting are resolved  - Nasogastric tube if ordered  - Administer ordered antiemetic medications as needed  - Provide nonpharmacologic comfort measures as appropriate  - Advance diet as tolerated, if ordered  - Consider nutrition services referral to assist patient with adequate nutrition and appropriate food choices  Outcome: Progressing  Goal: Maintains or returns to baseline bowel function  Description: INTERVENTIONS:  - Assess bowel function  - Encourage oral fluids to ensure adequate hydration  - Administer IV fluids if ordered to ensure adequate hydration  - Administer ordered medications as needed  - Encourage mobilization and activity  - Consider nutritional services referral to assist patient with adequate nutrition and appropriate food choices  Outcome: Progressing  Goal: Maintains adequate nutritional intake  Description: INTERVENTIONS:  - Monitor percentage of each meal consumed  - Identify factors contributing to decreased intake, treat as appropriate  - Assist with meals as needed  - Monitor I&O, weight, and lab values if indicated  - Obtain nutrition services referral as needed  Outcome: Progressing     Problem: GENITOURINARY - ADULT  Goal: Maintains or returns to baseline urinary function  Description: INTERVENTIONS:  - Assess urinary function  - Encourage oral fluids to ensure adequate hydration if ordered  - Administer IV fluids as ordered to ensure adequate hydration  - Administer ordered medications as needed  - Offer frequent toileting  - Follow urinary retention protocol if ordered  Outcome: Progressing  Goal: Absence of urinary retention  Description: INTERVENTIONS:  - Assess patients ability to void and empty bladder  - Monitor I/O  - Bladder scan as needed  - Discuss with physician/AP medications to alleviate retention as needed  - Discuss catheterization for long term situations as appropriate  Outcome: Progressing  Goal: Urinary catheter remains patent  Description: INTERVENTIONS:  - Assess patency of urinary catheter  - If patient has a chronic mehta, consider changing catheter if non-functioning  - Follow guidelines for intermittent irrigation of non-functioning urinary catheter  Outcome: Progressing     Problem: METABOLIC, FLUID AND ELECTROLYTES - ADULT  Goal: Electrolytes maintained within normal limits  Description: INTERVENTIONS:  - Monitor labs and assess patient for signs and symptoms of electrolyte imbalances  - Administer electrolyte replacement as ordered  - Monitor response to electrolyte replacements, including repeat lab results as appropriate  - Instruct patient on fluid and nutrition as appropriate  Outcome: Progressing  Goal: Fluid balance maintained  Description: INTERVENTIONS:  - Monitor labs   - Monitor I/O and WT  - Instruct patient on fluid and nutrition as appropriate  - Assess for signs & symptoms of volume excess or deficit  Outcome: Progressing     Problem: PAIN - ADULT  Goal: Verbalizes/displays adequate comfort level or baseline comfort level  Description: Interventions:  - Encourage patient to monitor pain and request assistance  - Assess pain using appropriate pain scale  - Administer analgesics based on type and severity of pain and evaluate response  - Implement non-pharmacological measures as appropriate and evaluate response  - Consider cultural and social influences on pain and pain management  - Notify physician/advanced practitioner if interventions unsuccessful or patient reports new pain  Outcome: Progressing     Problem: INFECTION - ADULT  Goal: Absence or prevention of progression during hospitalization  Description: INTERVENTIONS:  - Assess and monitor for signs and symptoms of infection  - Monitor lab/diagnostic results  - Monitor all insertion sites, i e  indwelling lines, tubes, and drains  - Monitor endotracheal if appropriate and nasal secretions for changes in amount and color  - Randolph appropriate cooling/warming therapies per order  - Administer medications as ordered  - Instruct and encourage patient and family to use good hand hygiene technique  - Identify and instruct in appropriate isolation precautions for identified infection/condition  Outcome: Progressing  Goal: Absence of fever/infection during neutropenic period  Description: INTERVENTIONS:  - Monitor WBC    Outcome: Progressing     Problem: SAFETY ADULT  Goal: Patient will remain free of falls  Description: INTERVENTIONS:  - Educate patient/family on patient safety including physical limitations  - Instruct patient to call for assistance with activity   - Consult OT/PT to assist with strengthening/mobility   - Keep Call bell within reach  - Keep bed low and locked with side rails adjusted as appropriate  - Keep care items and personal belongings within reach  - Initiate and maintain comfort rounds  - Make Fall Risk Sign visible to staff  - Offer Toileting every 2 Hours, in advance of need  - Initiate/Maintain bed alarm  - Obtain necessary fall risk management equipment:   - Apply yellow socks and bracelet for high fall risk patients  - Consider moving patient to room near nurses station  Outcome: Progressing  Goal: Maintain or return to baseline ADL function  Description: INTERVENTIONS:  -  Assess patient's ability to carry out ADLs; assess patient's baseline for ADL function and identify physical deficits which impact ability to perform ADLs (bathing, care of mouth/teeth, toileting, grooming, dressing, etc )  - Assess/evaluate cause of self-care deficits   - Assess range of motion  - Assess patient's mobility; develop plan if impaired  - Assess patient's need for assistive devices and provide as appropriate  - Encourage maximum independence but intervene and supervise when necessary  - Involve family in performance of ADLs  - Assess for home care needs following discharge   - Consider OT consult to assist with ADL evaluation and planning for discharge  - Provide patient education as appropriate  Outcome: Progressing  Goal: Maintains/Returns to pre admission functional level  Description: INTERVENTIONS:  - Perform BMAT or MOVE assessment daily    - Set and communicate daily mobility goal to care team and patient/family/caregiver  - Collaborate with rehabilitation services on mobility goals if consulted  - Perform Range of Motion 2 times a day  - Reposition patient every 22 hours  - Out of bed for toileting  - Record patient progress and toleration of activity level   Outcome: Progressing     Problem: DISCHARGE PLANNING  Goal: Discharge to home or other facility with appropriate resources  Description: INTERVENTIONS:  - Identify barriers to discharge w/patient and caregiver  - Arrange for needed discharge resources and transportation as appropriate  - Identify discharge learning needs (meds, wound care, etc )  - Arrange for interpretive services to assist at discharge as needed  - Refer to Case Management Department for coordinating discharge planning if the patient needs post-hospital services based on physician/advanced practitioner order or complex needs related to functional status, cognitive ability, or social support system  Outcome: Progressing     Problem: Knowledge Deficit  Goal: Patient/family/caregiver demonstrates understanding of disease process, treatment plan, medications, and discharge instructions  Description: Complete learning assessment and assess knowledge base    Interventions:  - Provide teaching at level of understanding  - Provide teaching via preferred learning methods  Outcome: Progressing

## 2022-10-03 NOTE — PROGRESS NOTES
NEPHROLOGY PROGRESS NOTE   Alex Wong 81 y o  male MRN: [id number]  Unit/Bed#: -01 Encounter: [id number]  Reason for Consult: DORA, now on HD    HEMODIALYSIS PROCEDURE NOTE  The patient was seen and examined on hemodialysis  Time: 4 hours  Sodium: 138 Blood flow: 400   Dialyzer: F160 Potassium: KPP Dialysate flow: 1 5 x BFR   Access: TDC Bicarbonate: 35 Ultrafiltration goal: 0 5 L   Medications on HD:  None  The patient was seen and examined on hemodialysis today  Blood pressure initially in the 110s  Currently in the 90s  Originally going for 1 L UF as goal, decreased to 0 5  Received midodrine prior to hemodialysis treatment  Currently remains asymptomatic and on room air  Dialysis catheter is working well  Denies chest discomfort or shortness of breath  Denies nausea, vomiting, diarrhea  ASSESSMENT/PLAN:  DORA on CKD stage III, now on HD:   -baseline creatinine previously 1 2-1 4, etiology of chronic disease suspected due to diabetic kidney disease and hypertensive nephrosclerosis, + multiple myeloma,+ cardiorenal syndrome,+ recurrent episodes of acute kidney injury   -receiving dialysis on Monday Wednesday Friday schedule  Initiated on 09/15   -planning on [facility] unit at discharge  -EDW: To be determined  Last post HD weight 117 1 kg   -will continue to monitor for renal recovery  Currently remains dialysis dependent  -CT negative for hydro  -UA negative  -recommend avoiding nephrotoxins, hypotension, IV contrast     Access: Tunneled dialysis catheter, placed 09/21/2022  Site with scab, minimal erythema  No evidence of infection  Blood pressure:  Blood pressure remains normal low   -currently on midodrine 10 mg t i d  -will add prn midodrine with HD for SBP <100  May also consider albumin with HD    -continue UF with HD as BP tolerates  -OP medications: Torsemide 40 mg BID  -avoid hypotension or high fluctuations in blood pressure      Anemia in CKD/multiple myeloma: Follows with Hematology/Oncology as an outpatient  On Velcade and Decadron with last treatment 09/06/2022   -Hgb currently 9 7   -TERRIE:  None, history of multiple myeloma  -received IV Venofer this admission  Avoiding further Venofer due to infection   -goal Hgb 10-12 g/dL  -continue to monitor and transfuse as needed for Hgb <7 0  MBD in CKD:   -continue to monitor PTH, phos, and Mg as OP    -previously on PhosLo due to hyperphosphatemia but unfortunately had borderline elevated calcium  Currently monitoring off of binders  Most recent phosphorus level 3 0   -recommend renal diet  Volume status/combined CHF:  With EF of 45%  Currently on room air   -previously on Demadex but discontinued due to low blood pressure   -continue UF as BP allows  -recommend fluid restriction  Electrolytes:   -mild hypokalemia  Will continue to monitor correct with HD  Will check magnesium level, added to am labs  -continue to monitor and trend with HD  Acute cholecystitis:  Status post cholecystostomy tube placement by IR on 09/29/2022     -antibiotics per primary care team, cultures positive for VRE  Other:  Ambulatory dysfunction, diabetes, NALLELY, morbid obesity, atrial fibrillation  Disposition:  Okay to discharge from Renal once medically cleared  SUBJECTIVE:  Please see above       OBJECTIVE:  Current Weight: Weight - Scale: 118 kg (260 lb 5 8 oz)  Vitals:    10/03/22 0930 10/03/22 1000 10/03/22 1015 10/03/22 1030   BP: 101/61 106/77 96/63 (!) 104/48   BP Location:       Pulse: 70 79 63 (!) 53   Resp: 18 18  18   Temp:       TempSrc:       SpO2:       Weight:       Height:           Intake/Output Summary (Last 24 hours) at 10/3/2022 1038  Last data filed at 10/3/2022 0900  Gross per 24 hour   Intake 1320 ml   Output 199 ml   Net 1121 ml     General: NAD  Skin: warm, dry, intact, no rash  HEENT: Moist mucous membranes, sclera anicteric, normocephalic, atraumatic  Neck: No apparent JVD appreciated  Chest: lung sounds clear B/L, on RA   CVS:Regular rate and rhythm, no murmer   Abdomen: Soft, round, non-tender, +BS, obese, choly tube present  Extremities: No B/L LE edema present, left AKA  Neuro: alert and oriented  Psych: appropriate mood and affect     Medications:    Current Facility-Administered Medications:     acetaminophen (TYLENOL) tablet 650 mg, 650 mg, Oral, Q6H PRN, Saintclair Skene, MD    albuterol (PROVENTIL HFA,VENTOLIN HFA) inhaler 2 puff, 2 puff, Inhalation, Q6H PRN, Saintclair Skene, MD    allopurinol (ZYLOPRIM) tablet 300 mg, 300 mg, Oral, Daily, Saintclair Skene, MD, 300 mg at 10/02/22 0850    atorvastatin (LIPITOR) tablet 40 mg, 40 mg, Oral, After Molly Tovar MD, 40 mg at 10/02/22 1723    b complex-vitamin C-folic acid (NEPHROCAPS) capsule 1 capsule, 1 capsule, Oral, Daily With Dinner, Bi Alegria MD, 1 capsule at 10/02/22 1724    cholestyramine sugar free (QUESTRAN LIGHT) packet 4 g, 4 g, Oral, BID, Saintclair Skene, MD, 4 g at 10/02/22 1944    clotrimazole (LOTRIMIN) 1 % cream, , Topical, BID, Saintclair Skene, MD, Given at 10/02/22 1725    DAPTOmycin (CUBICIN) 400 mg in sodium chloride 0 9 % 50 mL IVPB, 4 mg/kg (Adjusted), Intravenous, Weekly, MD Vanessa Ramirez ON 10/5/2022] DAPTOmycin (CUBICIN) 400 mg in sodium chloride 0 9 % 50 mL IVPB, 4 mg/kg (Adjusted), Intravenous, Weekly, MD Vanessa Ramirez ON 10/7/2022] DAPTOmycin (CUBICIN) 575 mg in sodium chloride 0 9 % 50 mL IVPB, 6 mg/kg (Adjusted), Intravenous, Weekly, Ai Giraldo MD    dicyclomine (BENTYL) capsule 10 mg, 10 mg, Oral, 4x Daily (AC & HS), Saintclair Skene, MD, 10 mg at 10/02/22 2128    diphenoxylate-atropine (LOMOTIL) 2 5-0 025 mg per tablet 1 tablet, 1 tablet, Oral, Q12H, NICOLA Tamayo, 1 tablet at 10/03/22 0005    fluticasone-vilanterol (BREO ELLIPTA) 200-25 MCG/INH inhaler 1 puff, 1 puff, Inhalation, Daily, Saintclair Skene, MD, 1 puff at 10/02/22 0851    insulin lispro (HumaLOG) 100 units/mL subcutaneous injection 1-6 Units, 1-6 Units, Subcutaneous, TID With Meals, 1 Units at 10/03/22 0855 **AND** Fingerstick Glucose (POCT), , , 4x Daily AC and at bedtime, Pretty Roper MD    melatonin tablet 6 mg, 6 mg, Oral, HS, Pretty Roper MD, 6 mg at 10/02/22 2128    meropenem (MERREM) 500 mg in sodium chloride 0 9 % 50 mL IVPB, 500 mg, Intravenous, Q24H, Pretty Roper MD, Last Rate: 100 mL/hr at 10/02/22 1457, 500 mg at 10/02/22 1457    midodrine (PROAMATINE) tablet 10 mg, 10 mg, Oral, TID AC, Pretty Roper MD, 10 mg at 10/03/22 0933    ondansetron (ZOFRAN) injection 4 mg, 4 mg, Intravenous, Q6H PRN, Pretty Roper MD, 4 mg at 09/26/22 1023    pantoprazole (PROTONIX) EC tablet 40 mg, 40 mg, Oral, Early Morning, Anupam Bynum MD, 40 mg at 10/03/22 0515    trimethobenzamide (TIGAN) IM injection 200 mg, 200 mg, Intramuscular, Q6H PRN, Pretty Roper MD, 200 mg at 09/23/22 1004    vancomycin (VANCOCIN) capsule 125 mg, 125 mg, Oral, BID, Pretty Roper MD, 125 mg at 10/02/22 1723    warfarin (COUMADIN) tablet 5 mg, 5 mg, Oral, Daily (warfarin), Vahid Moon MD, 5 mg at 10/02/22 1724    Laboratory Results:  Results from last 7 days   Lab Units 10/03/22  0449 10/02/22  0507 09/30/22  0620 09/29/22  0652 09/28/22  0458   WBC Thousand/uL 7 30 6 40 7 53   < > 7 33   HEMOGLOBIN g/dL 9 7* 10 1* 9 4*   < > 8 2*   HEMATOCRIT % 33 1* 34 5* 31 0*   < > 27 5*   PLATELETS Thousands/uL 429* 436* 454*   < > 318   SODIUM mmol/L 141 141 137   < > 136   POTASSIUM mmol/L 3 2* 3 6 4 0   < > 3 5   CHLORIDE mmol/L 102 100 98   < > 97   CO2 mmol/L 25 30 28   < > 24   BUN mg/dL 20 17 24   < > 38*   CREATININE mg/dL 7 60* 6 15* 6 49*   < > 7 81*   CALCIUM mg/dL 9 0 9 4 9 6   < > 8 8   PHOSPHORUS mg/dL  --   --  3 0  --   --    ALK PHOS U/L 207*  --  183*  --  145*   ALT U/L 19  --  14  --  12   AST U/L 41  --  37  --  18    < > = values in this interval not displayed

## 2022-10-03 NOTE — ASSESSMENT & PLAN NOTE
9/23 this morning patient started to develop some nausea and vomiting, vomiting noted to be dark in color, no dark stools noted in rectal tube  On Protonix  Most likely secondary to large hiatal hernia on previous EGD  9/24 rectal tube noted to have dark stools  N/V resolved, continues to have abdominal tenderness  Hg is 9 7  Rectal tube was removed on 10/01  CT abd/pelvis- Distended gallbladder with cholelithiasis, wall thickening, and pericholecystic inflammatory change  There appear to be calculi extending into the cystic duct  Findings can be due to acute cholecystitis in the appropriate clinical setting  Consider gallbladder ultrasound  2  Previously seen inflammatory changes related to acute sigmoid diverticulitis are no longer visualized "  RUQ US- " Again seen is a distended gallbladder with gallbladder wall thickening up to 15 mm, also containing intraluminal air, gallstones, sludge  Sonographic Lentz's sign is negative "  Surgical follow-up noted  As per surgery patient is not a candidate for cholecystectomy at this time  They recommended cholecystomy tube placement  · HIDA scan showed-Nonvisualization of the gallbladder concerning for acute cholecystitis  · Continue IV antibiotics per ID  · Patient  underwent cholecystomy tube placement by IR on September 29  · Follow-up culture results from cholecystomy procedure  · Cultures from cholecystomy tube grew VRE  · Infectious Disease added daptomycin    Continue on Merrem and daptomycin and oral vancomycin  · Patient is tolerating diet

## 2022-10-03 NOTE — ASSESSMENT & PLAN NOTE
· 2nd episode in one month (tx with 10 days antibx 8/2-8/11)  · CTAP acute diverticulitis  · GI following  · 7/25 Colonoscopy--2 polyps removed, Mild diverticula in the descending colon and sigmoid colon, Small, internal hemorrhoids  · 9/22 finished 10 day course of zosyn  · Questran started 9/17   · Imodium PRN  · Bentyl added 9/18 by GI  · advance diet per gi recommendations  · Gi suspects  multifactorial probably were related to the antibiotics that he is receiving for the diverticulitis as well as a chemo effect from his myeloma  May need to hold chemo on discharge for a period of time  Treatment at this point is supportive  · Rectal tube was discontinued  · After discontinuation of Zosyn the following day on 09/23 patient started to develop leukocytosis with elevated procalcitonin  · Consulted Infectious Disease  · C diff is negative  · 9/24 Started patient on p o  Vancomycin and Zosyn  · ID switched patient to [address] [place] [address]    Continue Merrem and oral vancomycin  · Id added daptomycin

## 2022-10-03 NOTE — ASSESSMENT & PLAN NOTE
· Baseline hgb 8  · Received IV venofer  · Trend H&H  · Transfuse for hgb <7  · Patient underwent EGD on 09/26 which showed One 3 mm polyp in the stomach removed with biopsy forceps  Large sliding hiatal hernia without Demian lesions     Biliary stents in place draining  Dilation of the stomach with large amount of retained   liquid upon entrance to the stomach   Suctioned out  Jozef Cunningham for element of   Gastroparesis  · Continue Protonix

## 2022-10-03 NOTE — ASSESSMENT & PLAN NOTE
· 9/18 Bradycardia with Junctional escape beats 20's overnight with hypotension  · No rate control meds d/c in august due to bradycardia  · TSH 0 4  · Cards consulted recommending CPAP HS and occurs with apnea episodes   · Patient is scheduled for cholecystomy tube placement today  · Continue on Coumadin 5 mg daily    INR this morning is 1 85  · Trend PT INR

## 2022-10-04 ENCOUNTER — HOSPITAL ENCOUNTER (OUTPATIENT)
Dept: INFUSION CENTER | Facility: HOSPITAL | Age: 72
Discharge: HOME | End: 2022-10-04
Attending: INTERNAL MEDICINE

## 2022-10-04 VITALS
TEMPERATURE: 98.4 F | HEIGHT: 75 IN | RESPIRATION RATE: 16 BRPM | HEART RATE: 72 BPM | DIASTOLIC BLOOD PRESSURE: 53 MMHG | SYSTOLIC BLOOD PRESSURE: 102 MMHG | BODY MASS INDEX: 31.41 KG/M2 | OXYGEN SATURATION: 97 % | WEIGHT: 252.65 LBS

## 2022-10-04 LAB
ANION GAP SERPL CALCULATED.3IONS-SCNC: 12 MMOL/L (ref 4–13)
BUN SERPL-MCNC: 9 MG/DL (ref 5–25)
CALCIUM SERPL-MCNC: 8.9 MG/DL (ref 8.3–10.1)
CHLORIDE SERPL-SCNC: 102 MMOL/L (ref 96–108)
CO2 SERPL-SCNC: 28 MMOL/L (ref 21–32)
CREAT SERPL-MCNC: 4.75 MG/DL (ref 0.6–1.3)
ERYTHROCYTE [DISTWIDTH] IN BLOOD BY AUTOMATED COUNT: 17.9 % (ref 11.6–15.1)
FLUAV RNA RESP QL NAA+PROBE: NEGATIVE
FLUBV RNA RESP QL NAA+PROBE: NEGATIVE
GFR SERPL CREATININE-BSD FRML MDRD: 11 ML/MIN/1.73SQ M
GLUCOSE SERPL-MCNC: 158 MG/DL (ref 65–140)
GLUCOSE SERPL-MCNC: 186 MG/DL (ref 65–140)
GLUCOSE SERPL-MCNC: 190 MG/DL (ref 65–140)
HCT VFR BLD AUTO: 33.9 % (ref 36.5–49.3)
HGB BLD-MCNC: 10.1 G/DL (ref 12–17)
INR PPP: 2.37 (ref 0.84–1.19)
MCH RBC QN AUTO: 26.2 PG (ref 26.8–34.3)
MCHC RBC AUTO-ENTMCNC: 29.8 G/DL (ref 31.4–37.4)
MCV RBC AUTO: 88 FL (ref 82–98)
PLATELET # BLD AUTO: 430 THOUSANDS/UL (ref 149–390)
PMV BLD AUTO: 9.8 FL (ref 8.9–12.7)
POTASSIUM SERPL-SCNC: 3.2 MMOL/L (ref 3.5–5.3)
PROTHROMBIN TIME: 27.2 SECONDS (ref 11.6–14.5)
RBC # BLD AUTO: 3.86 MILLION/UL (ref 3.88–5.62)
RSV RNA RESP QL NAA+PROBE: NEGATIVE
SARS-COV-2 RNA RESP QL NAA+PROBE: NEGATIVE
SODIUM SERPL-SCNC: 142 MMOL/L (ref 135–147)
WBC # BLD AUTO: 7.81 THOUSAND/UL (ref 4.31–10.16)

## 2022-10-04 PROCEDURE — 99232 SBSQ HOSP IP/OBS MODERATE 35: CPT | Performed by: NURSE PRACTITIONER

## 2022-10-04 PROCEDURE — 0241U HB NFCT DS VIR RESP RNA 4 TRGT: CPT | Performed by: PHYSICIAN ASSISTANT

## 2022-10-04 PROCEDURE — 99239 HOSP IP/OBS DSCHRG MGMT >30: CPT | Performed by: PHYSICIAN ASSISTANT

## 2022-10-04 PROCEDURE — 80048 BASIC METABOLIC PNL TOTAL CA: CPT | Performed by: INTERNAL MEDICINE

## 2022-10-04 PROCEDURE — 85610 PROTHROMBIN TIME: CPT | Performed by: INTERNAL MEDICINE

## 2022-10-04 PROCEDURE — 82948 REAGENT STRIP/BLOOD GLUCOSE: CPT

## 2022-10-04 PROCEDURE — 99232 SBSQ HOSP IP/OBS MODERATE 35: CPT | Performed by: INTERNAL MEDICINE

## 2022-10-04 PROCEDURE — 85027 COMPLETE CBC AUTOMATED: CPT | Performed by: INTERNAL MEDICINE

## 2022-10-04 RX ORDER — DIPHENOXYLATE HYDROCHLORIDE AND ATROPINE SULFATE 2.5; .025 MG/1; MG/1
1 TABLET ORAL EVERY 12 HOURS
Qty: 20 TABLET | Refills: 0 | Status: SHIPPED | OUTPATIENT
Start: 2022-10-04 | End: 2022-10-14

## 2022-10-04 RX ORDER — CHOLECALCIFEROL (VITAMIN D3) 10 MCG
1 TABLET ORAL
Qty: 30 CAPSULE | Refills: 0
Start: 2022-10-04

## 2022-10-04 RX ORDER — DICYCLOMINE HYDROCHLORIDE 10 MG/1
10 CAPSULE ORAL
Qty: 120 CAPSULE | Refills: 0
Start: 2022-10-04

## 2022-10-04 RX ORDER — LANOLIN ALCOHOL/MO/W.PET/CERES
6 CREAM (GRAM) TOPICAL
Qty: 30 TABLET | Refills: 0
Start: 2022-10-04

## 2022-10-04 RX ORDER — MIDODRINE HYDROCHLORIDE 5 MG/1
5 TABLET ORAL DAILY PRN
Qty: 30 TABLET | Refills: 0
Start: 2022-10-04

## 2022-10-04 RX ORDER — MIDODRINE HYDROCHLORIDE 10 MG/1
10 TABLET ORAL
Qty: 30 TABLET | Refills: 0
Start: 2022-10-04

## 2022-10-04 RX ORDER — VANCOMYCIN HYDROCHLORIDE 125 MG/1
125 CAPSULE ORAL 2 TIMES DAILY
Qty: 18 CAPSULE | Refills: 0
Start: 2022-10-04 | End: 2022-10-13

## 2022-10-04 RX ORDER — WARFARIN SODIUM 5 MG/1
5 TABLET ORAL
Qty: 30 TABLET | Refills: 0
Start: 2022-10-04

## 2022-10-04 RX ORDER — POTASSIUM CHLORIDE 20 MEQ/1
40 TABLET, EXTENDED RELEASE ORAL ONCE
Status: COMPLETED | OUTPATIENT
Start: 2022-10-04 | End: 2022-10-04

## 2022-10-04 RX ORDER — INSULIN LISPRO 100 [IU]/ML
1-6 INJECTION, SOLUTION INTRAVENOUS; SUBCUTANEOUS
Qty: 10 ML | Refills: 0
Start: 2022-10-04

## 2022-10-04 RX ADMIN — DICYCLOMINE HYDROCHLORIDE 10 MG: 10 CAPSULE ORAL at 06:33

## 2022-10-04 RX ADMIN — DICYCLOMINE HYDROCHLORIDE 10 MG: 10 CAPSULE ORAL at 10:34

## 2022-10-04 RX ADMIN — FLUTICASONE FUROATE AND VILANTEROL TRIFENATATE 1 PUFF: 200; 25 POWDER RESPIRATORY (INHALATION) at 09:28

## 2022-10-04 RX ADMIN — DIPHENOXYLATE HYDROCHLORIDE AND ATROPINE SULFATE 1 TABLET: 2.5; .025 TABLET ORAL at 10:34

## 2022-10-04 RX ADMIN — MIDODRINE HYDROCHLORIDE 10 MG: 5 TABLET ORAL at 06:32

## 2022-10-04 RX ADMIN — MIDODRINE HYDROCHLORIDE 10 MG: 5 TABLET ORAL at 10:34

## 2022-10-04 RX ADMIN — PANTOPRAZOLE SODIUM 40 MG: 40 TABLET, DELAYED RELEASE ORAL at 05:09

## 2022-10-04 RX ADMIN — INSULIN LISPRO 2 UNITS: 100 INJECTION, SOLUTION INTRAVENOUS; SUBCUTANEOUS at 09:27

## 2022-10-04 RX ADMIN — INSULIN LISPRO 1 UNITS: 100 INJECTION, SOLUTION INTRAVENOUS; SUBCUTANEOUS at 12:16

## 2022-10-04 RX ADMIN — VANCOMYCIN HYDROCHLORIDE 125 MG: 125 CAPSULE ORAL at 09:27

## 2022-10-04 RX ADMIN — ALLOPURINOL 300 MG: 300 TABLET ORAL at 09:27

## 2022-10-04 RX ADMIN — CLOTRIMAZOLE: 0.01 CREAM TOPICAL at 09:28

## 2022-10-04 RX ADMIN — POTASSIUM CHLORIDE 40 MEQ: 1500 TABLET, EXTENDED RELEASE ORAL at 09:27

## 2022-10-04 NOTE — PLAN OF CARE
Problem: Potential for Falls  Goal: Patient will remain free of falls  Description: INTERVENTIONS:  - Educate patient/family on patient safety including physical limitations  - Instruct patient to call for assistance with activity   - Consult OT/PT to assist with strengthening/mobility   - Keep Call bell within reach  - Keep bed low and locked with side rails adjusted as appropriate  - Keep care items and personal belongings within reach  - Initiate and maintain comfort rounds  - Make Fall Risk Sign visible to staff  - Offer Toileting every 2 Hours, in advance of need  - Initiate/Maintain bed alarm  - Obtain necessary fall risk management equipment:   - Apply yellow socks and bracelet for high fall risk patients  - Consider moving patient to room near nurses station  Outcome: Progressing     Problem: MOBILITY - ADULT  Goal: Maintain or return to baseline ADL function  Description: INTERVENTIONS:  -  Assess patient's ability to carry out ADLs; assess patient's baseline for ADL function and identify physical deficits which impact ability to perform ADLs (bathing, care of mouth/teeth, toileting, grooming, dressing, etc )  - Assess/evaluate cause of self-care deficits   - Assess range of motion  - Assess patient's mobility; develop plan if impaired  - Assess patient's need for assistive devices and provide as appropriate  - Encourage maximum independence but intervene and supervise when necessary  - Involve family in performance of ADLs  - Assess for home care needs following discharge   - Consider OT consult to assist with ADL evaluation and planning for discharge  - Provide patient education as appropriate  Outcome: Progressing     Problem: Prexisting or High Potential for Compromised Skin Integrity  Goal: Skin integrity is maintained or improved  Description: INTERVENTIONS:  - Identify patients at risk for skin breakdown  - Assess and monitor skin integrity  - Assess and monitor nutrition and hydration status  - Monitor labs   - Assess for incontinence   - Turn and reposition patient  - Assist with mobility/ambulation  - Relieve pressure over bony prominences  - Avoid friction and shearing  - Provide appropriate hygiene as needed including keeping skin clean and dry  - Evaluate need for skin moisturizer/barrier cream  - Collaborate with interdisciplinary team   - Patient/family teaching  - Consider wound care consult   Outcome: Progressing     Problem: Nutrition/Hydration-ADULT  Goal: Nutrient/Hydration intake appropriate for improving, restoring or maintaining nutritional needs  Description: Monitor and assess patient's nutrition/hydration status for malnutrition  Collaborate with interdisciplinary team and initiate plan and interventions as ordered  Monitor patient's weight and dietary intake as ordered or per policy  Utilize nutrition screening tool and intervene as necessary  Determine patient's food preferences and provide high-protein, high-caloric foods as appropriate       INTERVENTIONS:  - Monitor oral intake, urinary output, labs, and treatment plans  - Assess nutrition and hydration status and recommend course of action  - Evaluate amount of meals eaten  - Assist patient with eating if necessary   - Allow adequate time for meals  - Recommend/ encourage appropriate diets, oral nutritional supplements, and vitamin/mineral supplements  - Order, calculate, and assess calorie counts as needed  - Recommend, monitor, and adjust tube feedings and TPN/PPN based on assessed needs  - Assess need for intravenous fluids  - Provide specific nutrition/hydration education as appropriate  - Include patient/family/caregiver in decisions related to nutrition  Outcome: Progressing     Problem: GASTROINTESTINAL - ADULT  Goal: Minimal or absence of nausea and/or vomiting  Description: INTERVENTIONS:  - Administer IV fluids if ordered to ensure adequate hydration  - Maintain NPO status until nausea and vomiting are resolved  - Nasogastric tube if ordered  - Administer ordered antiemetic medications as needed  - Provide nonpharmacologic comfort measures as appropriate  - Advance diet as tolerated, if ordered  - Consider nutrition services referral to assist patient with adequate nutrition and appropriate food choices  Outcome: Progressing  Goal: Maintains or returns to baseline bowel function  Description: INTERVENTIONS:  - Assess bowel function  - Encourage oral fluids to ensure adequate hydration  - Administer IV fluids if ordered to ensure adequate hydration  - Administer ordered medications as needed  - Encourage mobilization and activity  - Consider nutritional services referral to assist patient with adequate nutrition and appropriate food choices  Outcome: Progressing  Goal: Maintains adequate nutritional intake  Description: INTERVENTIONS:  - Monitor percentage of each meal consumed  - Identify factors contributing to decreased intake, treat as appropriate  - Assist with meals as needed  - Monitor I&O, weight, and lab values if indicated  - Obtain nutrition services referral as needed  Outcome: Progressing     Problem: GENITOURINARY - ADULT  Goal: Maintains or returns to baseline urinary function  Description: INTERVENTIONS:  - Assess urinary function  - Encourage oral fluids to ensure adequate hydration if ordered  - Administer IV fluids as ordered to ensure adequate hydration  - Administer ordered medications as needed  - Offer frequent toileting  - Follow urinary retention protocol if ordered  Outcome: Progressing  Goal: Absence of urinary retention  Description: INTERVENTIONS:  - Assess patients ability to void and empty bladder  - Monitor I/O  - Bladder scan as needed  - Discuss with physician/AP medications to alleviate retention as needed  - Discuss catheterization for long term situations as appropriate  Outcome: Progressing  Goal: Urinary catheter remains patent  Description: INTERVENTIONS:  - Assess patency of urinary catheter  - If patient has a chronic mehta, consider changing catheter if non-functioning  - Follow guidelines for intermittent irrigation of non-functioning urinary catheter  Outcome: Progressing     Problem: METABOLIC, FLUID AND ELECTROLYTES - ADULT  Goal: Electrolytes maintained within normal limits  Description: INTERVENTIONS:  - Monitor labs and assess patient for signs and symptoms of electrolyte imbalances  - Administer electrolyte replacement as ordered  - Monitor response to electrolyte replacements, including repeat lab results as appropriate  - Instruct patient on fluid and nutrition as appropriate  Outcome: Progressing  Goal: Fluid balance maintained  Description: INTERVENTIONS:  - Monitor labs   - Monitor I/O and WT  - Instruct patient on fluid and nutrition as appropriate  - Assess for signs & symptoms of volume excess or deficit  Outcome: Progressing     Problem: PAIN - ADULT  Goal: Verbalizes/displays adequate comfort level or baseline comfort level  Description: Interventions:  - Encourage patient to monitor pain and request assistance  - Assess pain using appropriate pain scale  - Administer analgesics based on type and severity of pain and evaluate response  - Implement non-pharmacological measures as appropriate and evaluate response  - Consider cultural and social influences on pain and pain management  - Notify physician/advanced practitioner if interventions unsuccessful or patient reports new pain  Outcome: Progressing     Problem: INFECTION - ADULT  Goal: Absence or prevention of progression during hospitalization  Description: INTERVENTIONS:  - Assess and monitor for signs and symptoms of infection  - Monitor lab/diagnostic results  - Monitor all insertion sites, i e  indwelling lines, tubes, and drains  - Monitor endotracheal if appropriate and nasal secretions for changes in amount and color  - [place] appropriate cooling/warming therapies per order  - Administer medications as ordered  - Instruct and encourage patient and family to use good hand hygiene technique  - Identify and instruct in appropriate isolation precautions for identified infection/condition  Outcome: Progressing  Goal: Absence of fever/infection during neutropenic period  Description: INTERVENTIONS:  - Monitor WBC    Outcome: Progressing     Problem: SAFETY ADULT  Goal: Patient will remain free of falls  Description: INTERVENTIONS:  - Educate patient/family on patient safety including physical limitations  - Instruct patient to call for assistance with activity   - Consult OT/PT to assist with strengthening/mobility   - Keep Call bell within reach  - Keep bed low and locked with side rails adjusted as appropriate  - Keep care items and personal belongings within reach  - Initiate and maintain comfort rounds  - Make Fall Risk Sign visible to staff  - Offer Toileting every 2 Hours, in advance of need  - Initiate/Maintain bed alarm  - Obtain necessary fall risk management equipment:   - Apply yellow socks and bracelet for high fall risk patients  - Consider moving patient to room near nurses station  Outcome: Progressing  Goal: Maintain or return to baseline ADL function  Description: INTERVENTIONS:  -  Assess patient's ability to carry out ADLs; assess patient's baseline for ADL function and identify physical deficits which impact ability to perform ADLs (bathing, care of mouth/teeth, toileting, grooming, dressing, etc )  - Assess/evaluate cause of self-care deficits   - Assess range of motion  - Assess patient's mobility; develop plan if impaired  - Assess patient's need for assistive devices and provide as appropriate  - Encourage maximum independence but intervene and supervise when necessary  - Involve family in performance of ADLs  - Assess for home care needs following discharge   - Consider OT consult to assist with ADL evaluation and planning for discharge  - Provide patient education as appropriate  Outcome: Progressing  Goal: Maintains/Returns to pre admission functional level  Description: INTERVENTIONS:  - Perform BMAT or MOVE assessment daily    - Set and communicate daily mobility goal to care team and patient/family/caregiver  - Collaborate with rehabilitation services on mobility goals if consulted  - Perform Range of Motion 2 times a day  - Reposition patient every 22 hours  - Out of bed for toileting  - Record patient progress and toleration of activity level   Outcome: Progressing     Problem: DISCHARGE PLANNING  Goal: Discharge to home or other facility with appropriate resources  Description: INTERVENTIONS:  - Identify barriers to discharge w/patient and caregiver  - Arrange for needed discharge resources and transportation as appropriate  - Identify discharge learning needs (meds, wound care, etc )  - Arrange for interpretive services to assist at discharge as needed  - Refer to Case Management Department for coordinating discharge planning if the patient needs post-hospital services based on physician/advanced practitioner order or complex needs related to functional status, cognitive ability, or social support system  Outcome: Progressing     Problem: Knowledge Deficit  Goal: Patient/family/caregiver demonstrates understanding of disease process, treatment plan, medications, and discharge instructions  Description: Complete learning assessment and assess knowledge base    Interventions:  - Provide teaching at level of understanding  - Provide teaching via preferred learning methods  Outcome: Progressing

## 2022-10-04 NOTE — CASE MANAGEMENT
Case Management Progress Note    Patient name Linda Olivarez  Location /-14 MRN [id number]  : 1950 Date 10/4/2022       LOS (days): 22  Geometric Mean LOS (GMLOS) (days): 6 00  Days to GMLOS:-15 7        OBJECTIVE:        Current admission status: Inpatient  Preferred Pharmacy:   [address] [address] [place]  [address] [facility] [address]  Phone: [phone] Fax: [phone]    Primary Care Provider: Zenia Arriaza MD    Primary Insurance: [facility]  Secondary Insurance:     PROGRESS NOTE:  [facility] to transport patient to [facility] today at 1430  Notified Patrica at [address]  Called and notified Karina Frank at [place] [facility] [phone]  [facility] for [facility] [phone] re: patient discharge today and need for dialysis tomorrow  [facility] for yokasta Hawkins re: discharge and time

## 2022-10-04 NOTE — ASSESSMENT & PLAN NOTE
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

## 2022-10-04 NOTE — PLAN OF CARE
Problem: Potential for Falls  Goal: Patient will remain free of falls  Description: INTERVENTIONS:  - Educate patient/family on patient safety including physical limitations  - Instruct patient to call for assistance with activity   - Consult OT/PT to assist with strengthening/mobility   - Keep Call bell within reach  - Keep bed low and locked with side rails adjusted as appropriate  - Keep care items and personal belongings within reach  - Initiate and maintain comfort rounds  - Make Fall Risk Sign visible to staff  - Offer Toileting every 2 Hours, in advance of need  - Initiate/Maintain bed alarm  - Obtain necessary fall risk management equipment:   - Apply yellow socks and bracelet for high fall risk patients  - Consider moving patient to room near nurses station  Outcome: Progressing     Problem: MOBILITY - ADULT  Goal: Maintain or return to baseline ADL function  Description: INTERVENTIONS:  -  Assess patient's ability to carry out ADLs; assess patient's baseline for ADL function and identify physical deficits which impact ability to perform ADLs (bathing, care of mouth/teeth, toileting, grooming, dressing, etc )  - Assess/evaluate cause of self-care deficits   - Assess range of motion  - Assess patient's mobility; develop plan if impaired  - Assess patient's need for assistive devices and provide as appropriate  - Encourage maximum independence but intervene and supervise when necessary  - Involve family in performance of ADLs  - Assess for home care needs following discharge   - Consider OT consult to assist with ADL evaluation and planning for discharge  - Provide patient education as appropriate  Outcome: Progressing     Problem: Prexisting or High Potential for Compromised Skin Integrity  Goal: Skin integrity is maintained or improved  Description: INTERVENTIONS:  - Identify patients at risk for skin breakdown  - Assess and monitor skin integrity  - Assess and monitor nutrition and hydration status  - Monitor labs   - Assess for incontinence   - Turn and reposition patient  - Assist with mobility/ambulation  - Relieve pressure over bony prominences  - Avoid friction and shearing  - Provide appropriate hygiene as needed including keeping skin clean and dry  - Evaluate need for skin moisturizer/barrier cream  - Collaborate with interdisciplinary team   - Patient/family teaching  - Consider wound care consult   Outcome: Progressing     Problem: Nutrition/Hydration-ADULT  Goal: Nutrient/Hydration intake appropriate for improving, restoring or maintaining nutritional needs  Description: Monitor and assess patient's nutrition/hydration status for malnutrition  Collaborate with interdisciplinary team and initiate plan and interventions as ordered  Monitor patient's weight and dietary intake as ordered or per policy  Utilize nutrition screening tool and intervene as necessary  Determine patient's food preferences and provide high-protein, high-caloric foods as appropriate       INTERVENTIONS:  - Monitor oral intake, urinary output, labs, and treatment plans  - Assess nutrition and hydration status and recommend course of action  - Evaluate amount of meals eaten  - Assist patient with eating if necessary   - Allow adequate time for meals  - Recommend/ encourage appropriate diets, oral nutritional supplements, and vitamin/mineral supplements  - Order, calculate, and assess calorie counts as needed  - Recommend, monitor, and adjust tube feedings and TPN/PPN based on assessed needs  - Assess need for intravenous fluids  - Provide specific nutrition/hydration education as appropriate  - Include patient/family/caregiver in decisions related to nutrition  Outcome: Progressing     Problem: GASTROINTESTINAL - ADULT  Goal: Minimal or absence of nausea and/or vomiting  Description: INTERVENTIONS:  - Administer IV fluids if ordered to ensure adequate hydration  - Maintain NPO status until nausea and vomiting are resolved  - Nasogastric tube if ordered  - Administer ordered antiemetic medications as needed  - Provide nonpharmacologic comfort measures as appropriate  - Advance diet as tolerated, if ordered  - Consider nutrition services referral to assist patient with adequate nutrition and appropriate food choices  Outcome: Progressing  Goal: Maintains or returns to baseline bowel function  Description: INTERVENTIONS:  - Assess bowel function  - Encourage oral fluids to ensure adequate hydration  - Administer IV fluids if ordered to ensure adequate hydration  - Administer ordered medications as needed  - Encourage mobilization and activity  - Consider nutritional services referral to assist patient with adequate nutrition and appropriate food choices  Outcome: Progressing  Goal: Maintains adequate nutritional intake  Description: INTERVENTIONS:  - Monitor percentage of each meal consumed  - Identify factors contributing to decreased intake, treat as appropriate  - Assist with meals as needed  - Monitor I&O, weight, and lab values if indicated  - Obtain nutrition services referral as needed  Outcome: Progressing     Problem: GENITOURINARY - ADULT  Goal: Maintains or returns to baseline urinary function  Description: INTERVENTIONS:  - Assess urinary function  - Encourage oral fluids to ensure adequate hydration if ordered  - Administer IV fluids as ordered to ensure adequate hydration  - Administer ordered medications as needed  - Offer frequent toileting  - Follow urinary retention protocol if ordered  Outcome: Progressing  Goal: Absence of urinary retention  Description: INTERVENTIONS:  - Assess patients ability to void and empty bladder  - Monitor I/O  - Bladder scan as needed  - Discuss with physician/AP medications to alleviate retention as needed  - Discuss catheterization for long term situations as appropriate  Outcome: Progressing  Goal: Urinary catheter remains patent  Description: INTERVENTIONS:  - Assess patency of urinary catheter  - If patient has a chronic mehta, consider changing catheter if non-functioning  - Follow guidelines for intermittent irrigation of non-functioning urinary catheter  Outcome: Progressing     Problem: METABOLIC, FLUID AND ELECTROLYTES - ADULT  Goal: Electrolytes maintained within normal limits  Description: INTERVENTIONS:  - Monitor labs and assess patient for signs and symptoms of electrolyte imbalances  - Administer electrolyte replacement as ordered  - Monitor response to electrolyte replacements, including repeat lab results as appropriate  - Instruct patient on fluid and nutrition as appropriate  Outcome: Progressing  Goal: Fluid balance maintained  Description: INTERVENTIONS:  - Monitor labs   - Monitor I/O and WT  - Instruct patient on fluid and nutrition as appropriate  - Assess for signs & symptoms of volume excess or deficit  Outcome: Progressing     Problem: PAIN - ADULT  Goal: Verbalizes/displays adequate comfort level or baseline comfort level  Description: Interventions:  - Encourage patient to monitor pain and request assistance  - Assess pain using appropriate pain scale  - Administer analgesics based on type and severity of pain and evaluate response  - Implement non-pharmacological measures as appropriate and evaluate response  - Consider cultural and social influences on pain and pain management  - Notify physician/advanced practitioner if interventions unsuccessful or patient reports new pain  Outcome: Progressing     Problem: INFECTION - ADULT  Goal: Absence or prevention of progression during hospitalization  Description: INTERVENTIONS:  - Assess and monitor for signs and symptoms of infection  - Monitor lab/diagnostic results  - Monitor all insertion sites, i e  indwelling lines, tubes, and drains  - Monitor endotracheal if appropriate and nasal secretions for changes in amount and color  - [place] appropriate cooling/warming therapies per order  - Administer medications as ordered  - Instruct and encourage patient and family to use good hand hygiene technique  - Identify and instruct in appropriate isolation precautions for identified infection/condition  Outcome: Progressing  Goal: Absence of fever/infection during neutropenic period  Description: INTERVENTIONS:  - Monitor WBC    Outcome: Progressing     Problem: SAFETY ADULT  Goal: Patient will remain free of falls  Description: INTERVENTIONS:  - Educate patient/family on patient safety including physical limitations  - Instruct patient to call for assistance with activity   - Consult OT/PT to assist with strengthening/mobility   - Keep Call bell within reach  - Keep bed low and locked with side rails adjusted as appropriate  - Keep care items and personal belongings within reach  - Initiate and maintain comfort rounds  - Make Fall Risk Sign visible to staff  - Offer Toileting every 2 Hours, in advance of need  - Initiate/Maintain bed alarm  - Obtain necessary fall risk management equipment:   - Apply yellow socks and bracelet for high fall risk patients  - Consider moving patient to room near nurses station  Outcome: Progressing  Goal: Maintain or return to baseline ADL function  Description: INTERVENTIONS:  -  Assess patient's ability to carry out ADLs; assess patient's baseline for ADL function and identify physical deficits which impact ability to perform ADLs (bathing, care of mouth/teeth, toileting, grooming, dressing, etc )  - Assess/evaluate cause of self-care deficits   - Assess range of motion  - Assess patient's mobility; develop plan if impaired  - Assess patient's need for assistive devices and provide as appropriate  - Encourage maximum independence but intervene and supervise when necessary  - Involve family in performance of ADLs  - Assess for home care needs following discharge   - Consider OT consult to assist with ADL evaluation and planning for discharge  - Provide patient education as appropriate  Outcome: Progressing  Goal: Maintains/Returns to pre admission functional level  Description: INTERVENTIONS:  - Perform BMAT or MOVE assessment daily    - Set and communicate daily mobility goal to care team and patient/family/caregiver  - Collaborate with rehabilitation services on mobility goals if consulted  - Perform Range of Motion 2 times a day  - Reposition patient every 22 hours  - Out of bed for toileting  - Record patient progress and toleration of activity level   Outcome: Progressing     Problem: DISCHARGE PLANNING  Goal: Discharge to home or other facility with appropriate resources  Description: INTERVENTIONS:  - Identify barriers to discharge w/patient and caregiver  - Arrange for needed discharge resources and transportation as appropriate  - Identify discharge learning needs (meds, wound care, etc )  - Arrange for interpretive services to assist at discharge as needed  - Refer to Case Management Department for coordinating discharge planning if the patient needs post-hospital services based on physician/advanced practitioner order or complex needs related to functional status, cognitive ability, or social support system  Outcome: Progressing     Problem: Knowledge Deficit  Goal: Patient/family/caregiver demonstrates understanding of disease process, treatment plan, medications, and discharge instructions  Description: Complete learning assessment and assess knowledge base    Interventions:  - Provide teaching at level of understanding  - Provide teaching via preferred learning methods  Outcome: Progressing

## 2022-10-04 NOTE — ASSESSMENT & PLAN NOTE
· 2nd episode in one month (tx with 10 days antibx 8/2-8/11)  · CTAP acute diverticulitis  · GI following  · 7/25 Colonoscopy--2 polyps removed, Mild diverticula in the descending colon and sigmoid colon, Small, internal hemorrhoids  · 9/22 finished 10 day course of zosyn  · Questran started 9/17   · Imodium PRN  · Bentyl added 9/18 by GI  · advance diet per gi recommendations  · Gi suspects  multifactorial probably were related to the antibiotics that he is receiving for the diverticulitis as well as a chemo effect from his myeloma  May need to hold chemo on discharge for a period of time  Treatment at this point is supportive  · Rectal tube was discontinued  · After discontinuation of Zosyn the following day on 09/23 patient started to develop leukocytosis with elevated procalcitonin  · Consulted Infectious Disease- continue daptomycin through 10/7 and oral vancomycin through 10/13  · C diff is negative  · 9/24 Started patient on p o   Vancomycin and Zosyn  · ID added daptomycin

## 2022-10-04 NOTE — ASSESSMENT & PLAN NOTE
Lab Results   Component Value Date    HGBA1C 6 3 (H) 08/05/2022       Recent Labs     10/03/22  1708 10/03/22  2103 10/04/22  0842 10/04/22  1148   POCGLU 139 130 190* 186*       Blood Sugar Average: Last 72 hrs:  (P) 152 [id number]   · Hold lantus, sugars have been stable  · SSI on discharge, can resume Lantus if sugars are elevated outpatient/patient is dietary noncompliant

## 2022-10-04 NOTE — PROGRESS NOTES
Progress note - Gastroenterology   Mario Parr 81 y o  male MRN: [id number]  Unit/Bed#: -01 Encounter: [id number]    ASSESSMENT and PLAN       1  Diverticulitis -resolved  Admitted with abdominal pain, CT scan consistent with diverticulitis  Treated with Zosyn times 10 days     Repeat CT scan 9/24 showed resolution of sigmoid diverticulitis     2  Acute cholecystitis  Gallbladder distended with wall thickening and alaina cholecystic inflammatory changes, cystic duct calculi on CT scan  Denies abdominal pain but tender with palpation all quadrants  HIDA scan 9/28 shows nonvisualization of gallbladder consistent with acute cholecystitis  Cholecystostomy tube placed 9/29-draining bloody fluid, cultures grew VRE    -Surgery following and Infectious Disease following  -Continue IV antibiotics  per ID-continue meropenem and daptomycin added to cover VRE     2  Diarrhea  Developed diarrhea, stool negative for C diff x2  Likely multifactorial related to antibiotic use and current chemotherapy for multiple myeloma  Diarrhea improving-nursing reports soft to loose bowel movement    -continue Questran b i d  monitor bowel movements  If slowing down can downtitrate and ultimately discontinue   -continue Lomotil q 12 hours-can increase to t i d  If diarrhea continued  If bowel movements are slowing down can down titrate and discontinue  -on vancomycin prophylaxis due to continued antibiotics     3  Nausea/vomiting  Has improved significantly -denies nausea and has had no further emesis for greater than 5 days  Repeat EGD  9/26 showed small gastric polyp which was excised, large sliding hiatal hernia without Demian lesions, stomach dilated with large amount of retained liquid, concern for gastroparesis      Improved after cholecystotomy tube    -advanced diet, Ensure supplement  -Reglan discontinued 9/29 due to hallucinations and confusion  -continue pantoprazole 40 mg b i d   -consider gastric emptying study after gallbladder decompressed in future outpatient      Will sign off at this time  Thank you for this consult  Please call with any further questions  Chief Complaint   Patient presents with    Abnormal Lab     Patient [facility] via EMS [facility] for an elevated creatinine of 7 3  Reports yellow emesis for a few days  Patient has RUQ and RLQ abdominal pain, is currently getting chemo once a week for kidney cancer  SUBJECTIVE/HPI     /53   Pulse 72   Temp 98 4 °F (36 9 °C)   Resp 16   Ht 6' 3" (1 905 m)   Wt 115 kg (252 lb 10 4 oz)   SpO2 97%   BMI 31 58 kg/m²     PHYSICALEXAM  General appearance:  NAD, receiving dialysis this a m  Eyes:  no icterus   Head: Normocephalic, without obvious abnormality, atraumatic  Lungs: clear to auscultation bilaterally  Heart: regular rate and rhythm, S1, S2 normal, no murmur, click, rub or gallop  Abdomen:  Slightly distended but soft, mild diffuse pain with palpation all quadrants    Cholecystostomy tube continues to drain brownish bile liquid diarrhea improved-nursing reports 1 episode loose stool overnight  Extremities: extremities normal, atraumatic, no cyanosis or edema  Neurologic: Grossly normal    Lab Results   Component Value Date    GLUCOSE 64 (L) 08/01/2022    CALCIUM 8 9 10/04/2022     01/15/2018    K 3 2 (L) 10/04/2022    CO2 28 10/04/2022     10/04/2022    BUN 9 10/04/2022    CREATININE 4 75 (H) 10/04/2022     Lab Results   Component Value Date    WBC 7 81 10/04/2022    HGB 10 1 (L) 10/04/2022    HCT 33 9 (L) 10/04/2022    MCV 88 10/04/2022     (H) 10/04/2022     Lab Results   Component Value Date    ALT 19 10/03/2022    AST 41 10/03/2022     (H) 11/03/2019    ALKPHOS 207 (H) 10/03/2022    BILITOT 0 6 01/15/2018     No results found for: AMYLASE  Lab Results   Component Value Date    LIPASE 691 (H) 09/13/2022     Lab Results   Component Value Date    IRON 17 (L) 09/23/2022    TIBC 174 (L) 09/23/2022 FERRITIN 479 (H) 09/23/2022     Lab Results   Component Value Date    INR 2 37 (H) 10/04/2022

## 2022-10-04 NOTE — PROGRESS NOTES
Leon Reinbeck  81 y o   male  1950  mrn [id number]    Assessment/Plan:  1  Sigmoid diverticulitis/Acute VRE cholecystitis/Fever/Leukocytosis/  Diarrhea/DORA on CKD: No further fever and WBC count has decreased to  7 3K  He denies abd pain and diarrhea is decreasing  C diff was neg x 2  Pt underwent placement of cholecystotomy tube o 9/29 for tx of acute cholecystitis noted on HIDA scan  Bile cx grew VRE  Daptomycin was added on 10/2     Repeat abd CT was done on 9/25 and showed resolution of diverticulitis but continued to show findings suggestive of cholecystitis  HIDA scan did not visualize the GB c/w acute cholecystitis  Repeat bld cx's were neg  Zosyn was changed to [facility] 9/26 when Pt developed fever  He remains on low dose po Vanco for C diff prophylaxis  Creatinine conts to be elevated and he remains on dialysis     Pt presented with weakness, right-sided abd pain, and N/V/D      On admission, he did not have fever WBC count was 10 2K  Creatinine was elevated at 8 4 c/w DORA on CKD  Abd CT showed decreased sigmoid diverticulitis and possible cholecystitis  He was started on Zosyn for tx of diverticulitis  LFT's were nml  C diff and enteric stool panel were neg, Dialysis was started on 9/16  He finished course of Zosyn on 9/22  He developed low grade fever on 9/22  N/V recurred on 9/23  He conts to have diarrhea requiring rectal tube  WBC count increased to 11 6K on 9/24 and procalcitonin was elevated at 6 39  Zosyn was restarted and Pt was placed on oral Vanco  Repeat C diff was neg   No further low grade fever and WBC count has decreased to 8 8K but procalcitonin increased slightly to 7 79      - Cont Meropenem through tonight  - Cont Daptomycin to cover VRE isolated from bile cx sent when cholecystotomy tube was placed - Pt will receive last dose on 10/7  - Cont oral Vanco for C diff prophylaxis through 10/13/22  - Cont tx of diarrhea as per GI - ?due to abx tx or possibly microscopic colitis  - Cont dialysis as per Renal for DORA on CKD - abx doses have been adjusted based on creatinine clearance  - Cont to monitor for the development of toxicity to current abx tx  - Will sign off case, please re-consult if any new probs arise        Subjective:Pt has occas abd discomfort  Diarrhea is decreasing  Cholecystotomy tube is working well  No fever and WBC count is nml  No probs with current abx tx    Objective:  VSS, Tmax: 98 4  HEENT:  No scleral icterus  NECK: Supple  CARDIAC:  RRR, nml  S1, S2  LUNGS:  Clear anteriorly  ABDOMEN:  +BS, soft, nontender, +Cholecystotomy tube   MUSCULOSKELETAL:  No right calf pain  EXTREMITIES:  +Left AKA  SKIN:  No rash      Labs:  CBC w/diff  Recent Labs     10/03/22  0449   WBC 7 30   HGB 9 7*   HCT 33 1*   *   LYMPHOPCT 23   MONOPCT 11   EOSPCT 7*     BMP  Recent Labs     10/03/22  0449   K 3 2*      CO2 25   BUN 20   CREATININE 7 60*   CALCIUM 9 0     CMP  Recent Labs     10/03/22  0449   K 3 2*      CO2 25   BUN 20   CREATININE 7 60*   CALCIUM 9 0   ALKPHOS 207*   ALT 19   AST 41        labrc    Cultures:  Lab Results   Component Value Date    BLOODCX No Growth After 5 Days  09/25/2022    BLOODCX No Growth After 5 Days  09/25/2022    BLOODCX No Growth After 5 Days  09/12/2022    BLOODCX No Growth After 5 Days  09/12/2022    BLOODCX No Growth After 5 Days  10/04/2021    BLOODCX No Growth After 5 Days  10/04/2021    BLOODCX No Growth After 5 Days  06/25/2020    BLOODCX No Growth After 5 Days  06/25/2020    BLOODCX Staphylococcus coagulase negative (A) 12/04/2019    BLOODCX No Growth After 5 Days  12/04/2019    BLOODCX No Growth After 5 Days  10/29/2019    BLOODCX No Growth After 5 Days  10/29/2019    BLOODCX No Growth After 5 Days  07/25/2019    BLOODCX No Growth After 5 Days  07/25/2019    BLOODCX No Growth After 5 Days  12/25/2018    BLOODCX No Growth After 5 Days  12/25/2018    BLOODCX No Growth After 5 Days   03/23/2018    BLOODCX No Growth After 5 Days  03/23/2018    BLOODCX No Growth After 5 Days  10/08/2017    BLOODCX No Growth After 5 Days  10/08/2017    BLOODCX No Growth After 5 Days  09/15/2016    BLOODCX No Growth After 5 Days   09/15/2016     Lab Results   Component Value Date    WOUNDCULT 4+ Growth of Proteus mirabilis (A) 07/25/2019    WOUNDCULT 4+ Growth of  07/25/2019    WOUNDCULT (A) 07/25/2019     4+ Growth of Methicillin Resistant Staphylococcus aureus    WOUNDCULT 1+ Growth of Proteus mirabilis (A) 07/25/2019    WOUNDCULT 4+ Growth of  07/25/2019    WOUNDCULT 3+ Growth of Staphylococcus aureus (A) 12/25/2018    WOUNDCULT 2+ Growth of  12/25/2018    WOUNDCULT 4+ Growth of Staphylococcus aureus 09/15/2016    WOUNDCULT 4+ Growth of Proteus mirabilis 09/15/2016    WOUNDCULT 4+ Growth of Mixed Skin Nini 09/15/2016     Lab Results   Component Value Date    URINECX >100,000 cfu/ml Escherichia coli (A) 12/05/2019     Lab Results   Component Value Date    SPUTUMCULTUR 4+ Growth of  07/11/2020    SPUTUMCULTUR 1+ Growth of Aspergillus fumigatus (A) 07/11/2020       MED:  Daptomycin: #2  Meropenem: #9  Oral Vanco; #10        Completed:  Zosyn x 11 days on 9/22  Zosyn x 2 days on 9/25        Current Facility-Administered Medications:     acetaminophen (TYLENOL) tablet 650 mg, 650 mg, Oral, Q6H PRN, Karely Wright MD    albuterol (PROVENTIL HFA,VENTOLIN HFA) inhaler 2 puff, 2 puff, Inhalation, Q6H PRN, Karely Wright MD    allopurinol (ZYLOPRIM) tablet 300 mg, 300 mg, Oral, Daily, Karely Wright MD, 300 mg at 10/03/22 1403    atorvastatin (LIPITOR) tablet 40 mg, 40 mg, Oral, After Rakel Mesa MD, 40 mg at 10/03/22 1708    b complex-vitamin C-folic acid (NEPHROCAPS) capsule 1 capsule, 1 capsule, Oral, Daily With J Carlos Dudley MD, 1 capsule at 10/03/22 1706    cholestyramine sugar free (QUESTRAN LIGHT) packet 4 g, 4 g, Oral, BID, Karely Wright MD, 4 g at 10/03/22 2025    clotrimazole (LOTRIMIN) 1 % cream, , Topical, BID, Karely Wright MD, 1 application at 54/61/37 1708    DAPTOmycin (CUBICIN) 400 mg in sodium chloride 0 9 % 50 mL IVPB, 4 mg/kg (Adjusted), Intravenous, Weekly, Valentine iDllon MD, Last Rate: 100 mL/hr at 10/03/22 1631, 400 mg at 10/03/22 1631    [START ON 10/5/2022] DAPTOmycin (CUBICIN) 400 mg in sodium chloride 0 9 % 50 mL IVPB, 4 mg/kg (Adjusted), Intravenous, Weekly, Valentine Dillon MD  Ziggy Carrington  Aguilar Locks ON 10/7/2022] DAPTOmycin (CUBICIN) 575 mg in sodium chloride 0 9 % 50 mL IVPB, 6 mg/kg (Adjusted), Intravenous, Weekly, Valentine Dillon MD    dicyclomine (BENTYL) capsule 10 mg, 10 mg, Oral, 4x Daily (AC & HS), Deysi Ferreira MD, 10 mg at 10/03/22 1707    diphenoxylate-atropine (LOMOTIL) 2 5-0 025 mg per tablet 1 tablet, 1 tablet, Oral, Q12H, Lynette Lab, CRNP, 1 tablet at 10/03/22 1404    fluticasone-vilanterol (BREO ELLIPTA) 200-25 MCG/INH inhaler 1 puff, 1 puff, Inhalation, Daily, Deysi Ferreira MD, 1 puff at 10/03/22 1210    insulin lispro (HumaLOG) 100 units/mL subcutaneous injection 1-6 Units, 1-6 Units, Subcutaneous, TID With Meals, 2 Units at 10/03/22 1210 **AND** Fingerstick Glucose (POCT), , , 4x Daily AC and at bedtime, Deysi Ferreira MD    melatonin tablet 6 mg, 6 mg, Oral, HS, Deysi Ferreira MD, 6 mg at 10/02/22 2128    meropenem (MERREM) 500 mg in sodium chloride 0 9 % 50 mL IVPB, 500 mg, Intravenous, Q24H, Deysi Ferreira MD, Last Rate: 100 mL/hr at 10/03/22 1710, 500 mg at 10/03/22 1710    midodrine (PROAMATINE) tablet 10 mg, 10 mg, Oral, TID AC, Deysi Ferreira MD, 10 mg at 10/03/22 1403    midodrine (PROAMATINE) tablet 5 mg, 5 mg, Oral, Daily PRN, NICOLA Almanzar    [facility]ansetron [facility]) injection 4 mg, 4 mg, Intravenous, Q6H PRN, Deysi Ferreira MD, 4 mg at 09/26/22 1023    pantoprazole (PROTONIX) EC tablet 40 mg, 40 mg, Oral, Early Morning, Franchesca De Jesus MD, 40 mg at 10/03/22 0515    trimethobenzamide (TIGAN) IM injection 200 mg, 200 mg, Intramuscular, Q6H PRN, Deysi Ferreira MD, 200 mg at 09/23/22 1004    vancomycin (VANCOCIN) capsule 125 mg, 125 mg, Oral, BID, Karely Wright MD, 125 mg at 10/03/22 1403    warfarin (COUMADIN) tablet 5 mg, 5 mg, Oral, Daily (warfarin), Jeff Causey MD, 5 mg at 10/03/22 1706    Principal Problem:    DORA (acute kidney injury) ([facility] )  Active Problems:    Chronic [facility] fibrillation [facility] )    Morbid obesity ([facility] )    Chronic combined systolic and diastolic congestive heart failure ([facility] )    NALLELY (obstructive sleep apnea)    Diabetes mellitus, type 2 ([facility] )    Multiple myeloma ([facility] )    Ambulatory dysfunction    Anemia    Acute diverticulitis    Abnormal CT scan    Acute cholecystitis      Shay Moctezuma MD

## 2022-10-04 NOTE — ASSESSMENT & PLAN NOTE
Wt Readings from Last 3 Encounters:   10/04/22 115 kg (252 lb 10 4 oz)   09/06/22 117 kg (257 lb 15 oz)   08/30/22 118 kg (260 lb)     · EF 45% decreased RV fx  · I/O  · Daily weight  · Currently being managed with HD  · Demadex discontinued due to low bp and minimal urine output

## 2022-10-04 NOTE — PHYSICAL THERAPY NOTE
Physical Therapy Screen    Patient Name: Rito Alpers    JAQVU'G Date: 10/4/2022     Problem List  Principal Problem:    DORA (acute kidney injury) ([place] [address] )  Active Problems:    [facility]  )    Morbid obesity ([place] [address] )    Chronic combined systolic and diastolic congestive heart failure (HCC)    NALLELY (obstructive sleep apnea)    Diabetes mellitus, type 2 (HCC)    Multiple myeloma ([place] [address] )    Ambulatory dysfunction    Anemia    Acute diverticulitis    Abnormal CT scan    Acute cholecystitis       Past Medical History  Past Medical History:   Diagnosis Date    Cancer ([place] [address] )     CHF (congestive heart failure) ([facility])     Chronic kidney disease     COPD (chronic obstructive pulmonary disease) ([place] [address] )     COVID-19     Decubitus ulcer of heel     LAST ASSESSED [id number]    Diabetes mellitus ([place] [address] )     History of varicose veins     Hypertension     Intermittent claudication (HCC)     Neuropathy     Seasonal allergies         Past Surgical History  Past Surgical History:   Procedure Laterality Date    AMPUTATION ABOVE KNEE (AKA) Left 7/31/2019    Procedure: AMPUTATION ABOVE KNEE (AKA);   Surgeon: Gabi Edwards MD;  Location: [facility];  Service: General    ANGIOPLASTY      W/ FLUOROSC ANGIOGRAPGY PERIPHERAL ARTERY ADDITIONAL  LAST ASSESSED [id number]    ANGIOPLASTY / STENTING FEMORAL      TANSCATH INTRAVASCULAR STENT PLACEMENT PERCUTANEOUS FEMORAL     COLONOSCOPY  2010    CT BONE MARROW BIOPSY AND ASPIRATION  8/9/2019    FULL THICKNESS SKIN GRAFT      IR BIOPSY BONE MARROW  9/8/2022    IR CHOLECYSTOSTOMY TUBE PLACEMENT  9/29/2022    IR TEMPORARY DIALYSIS CATHETER PLACEMENT  9/15/2022    IR TUNNELED DIALYSIS CATHETER PLACEMENT  9/22/2022    TENDON REPAIR      TOE AMPUTATION Left 12/27/2018    Procedure: AMPUTATION left 4th TOE;  Surgeon: Amber Rothman DPM;  Location: [facility];  Service: Podiatry         10/04/22 0946   PT Last Visit   PT Visit Date 10/04/22   Note Type   Note type Screen   Additional Comments PT orders received  Chart reviewed  Per CM noted 9/13/22, pt is linden lift to W/C & receives assistance for ADL's at baseline  Plan to return to facility  Pt at functional baseline  Will DC PT orders, no need for acute skilled PT       Prema Guajardo

## 2022-10-04 NOTE — ASSESSMENT & PLAN NOTE
· Baseline hgb 8  · Received IV venofer  · Trend H&H  · Transfuse for hgb <7  · Patient underwent EGD on 09/26 which showed One 3 mm polyp in the stomach removed with biopsy forceps  Large sliding hiatal hernia without Demian lesions     Biliary stents in place draining  Dilation of the stomach with large amount of retained   liquid upon entrance to the stomach   Suctioned out  [employer] for element of   Gastroparesis  · Continue Protonix

## 2022-10-04 NOTE — ASSESSMENT & PLAN NOTE
9/23 this morning patient started to develop some nausea and vomiting, vomiting noted to be dark in color, no dark stools noted in rectal tube  On Protonix  Most likely secondary to large hiatal hernia on previous EGD  9/24 rectal tube noted to have dark stools  N/V resolved, continues to have abdominal tenderness  Hg is 9 7  Rectal tube was removed on 10/01  CT abd/pelvis- Distended gallbladder with cholelithiasis, wall thickening, and pericholecystic inflammatory change  There appear to be calculi extending into the cystic duct  Findings can be due to acute cholecystitis in the appropriate clinical setting  Consider gallbladder ultrasound  2  Previously seen inflammatory changes related to acute sigmoid diverticulitis are no longer visualized "  RUQ US- " Again seen is a distended gallbladder with gallbladder wall thickening up to 15 mm, also containing intraluminal air, gallstones, sludge  Sonographic Lentz's sign is negative "  Surgical follow-up noted  As per surgery patient is not a candidate for cholecystectomy at this time  They recommended cholecystomy tube placement  · HIDA scan showed-Nonvisualization of the gallbladder concerning for acute cholecystitis  · Continue IV antibiotics per ID through 10/7  · Patient  underwent cholecystomy tube placement by IR on September 29  · Follow-up culture results from cholecystomy procedure  · Cultures from cholecystomy tube grew VRE  · Infectious Disease added daptomycin    Continue on Merrem and daptomycin and oral vancomycin  · Patient is tolerating diet

## 2022-10-04 NOTE — DISCHARGE SUMMARY
[place]  Discharge- Frances Salomon 1950, 81 y o  male MRN: [id number]  Unit/Bed#: -01 Encounter: [id number]  Primary Care Provider: Jose Alberto Jorge MD   Date and time admitted to hospital: 9/12/2022  4:09 PM    * DORA (acute kidney injury) [facility])  Assessment & Plan  · DORA on CKD 3  · Most recent d/c creat 3-3 7 prior in 2 range  · CT no hydro, nonobstructing calculi  · UA neg  · Nephrology following  · 9/15 R tunneled HD cath placed  · 9/15 and 9/16 IHD  · Monitor for renal recovery remains oliguric  · S/p IHD on/ 9/19   · Currently on m/w/f schedule with no signs of renal recovery  · Temporary to PermCath conversion 09/21/2022  · No signs of renal recovery  · Continue dialysis as per Nephrology   · Nephrology on board    Acute cholecystitis  Assessment & Plan  9/23 this morning patient started to develop some nausea and vomiting, vomiting noted to be dark in color, no dark stools noted in rectal tube  On Protonix  Most likely secondary to large hiatal hernia on previous EGD  9/24 rectal tube noted to have dark stools  N/V resolved, continues to have abdominal tenderness  Hg is 9 7  Rectal tube was removed on 10/01  CT abd/pelvis- Distended gallbladder with cholelithiasis, wall thickening, and pericholecystic inflammatory change  There appear to be calculi extending into the cystic duct  Findings can be due to acute cholecystitis in the appropriate clinical setting  Consider gallbladder ultrasound  2  Previously seen inflammatory changes related to acute sigmoid diverticulitis are no longer visualized "  RUQ US- " Again seen is a distended gallbladder with gallbladder wall thickening up to 15 mm, also containing intraluminal air, gallstones, sludge  Sonographic Lentz's sign is negative "  Surgical follow-up noted  As per surgery patient is not a candidate for cholecystectomy at this time    They recommended cholecystomy tube placement  · HIDA scan showed-Nonvisualization of the gallbladder concerning for acute cholecystitis  · Continue IV antibiotics per ID through 10/7  · Patient  underwent cholecystomy tube placement by IR on September 29  · Follow-up culture results from cholecystomy procedure  · Cultures from cholecystomy tube grew VRE  · Infectious Disease added daptomycin  Continue on Merrem and daptomycin and oral vancomycin  · Patient is tolerating diet    Abnormal CT scan  Assessment & Plan  · CTAP-common bile duct stent with pneumobilia and air within gallblader  Distended gallbladder with cholelithiasis  · RUQ u/s distended gallbladder with air within stent in CBD similar to CT  · Surgery consulted  If concern for acute cholecystitis not surgical candidate would need per vanessa tube  · GI following  · ERCP 8/29/2022, Dr Nini Cerda, Lizzie Edmond esophagus seen during EGD, biopsy obtained   ERCP performed with endoscopic mucosal resection of abnormal looking ampullary tissue, PD, CBD stent placed  · No s/s acute cholecystitis monitor   · Surgical and GI follow-up noted    Acute diverticulitis  Assessment & Plan  · 2nd episode in one month (tx with 10 days antibx 8/2-8/11)  · CTAP acute diverticulitis  · GI following  · 7/25 Colonoscopy--2 polyps removed, Mild diverticula in the descending colon and sigmoid colon, Small, internal hemorrhoids  · 9/22 finished 10 day course of zosyn  · Questran started 9/17   · Imodium PRN  · Bentyl added 9/18 by GI  · advance diet per gi recommendations  · Gi suspects  multifactorial probably were related to the antibiotics that he is receiving for the diverticulitis as well as a chemo effect from his myeloma  May need to hold chemo on discharge for a period of time  Treatment at this point is supportive  · Rectal tube was discontinued  · After discontinuation of Zosyn the following day on 09/23 patient started to develop leukocytosis with elevated procalcitonin    · Consulted Infectious Disease- continue daptomycin through 10/7 and oral vancomycin through 10/13  · C diff is negative  · 9/24 Started patient on p o  Vancomycin and Zosyn  · ID added daptomycin    Anemia  Assessment & Plan  · Baseline hgb 8  · Received IV venofer  · Trend H&H  · Transfuse for hgb <7  · Patient underwent EGD on 09/26 which showed One 3 mm polyp in the stomach removed with biopsy forceps  Large sliding hiatal hernia without Demian lesions     Biliary stents in place draining  Dilation of the stomach with large amount of retained   liquid upon entrance to the stomach   Suctioned out  Coralyn Card for element of   Gastroparesis  · Continue Protonix      Ambulatory dysfunction  Assessment & Plan  · 2/2 L AKA  · PT/OT    [facility]  Assessment & Plan  · Follows with Dr Sharon Boxer  · On Velcade and decadron last tx 9/6  · Free light chains ordered outpt f/u with oncology    Diabetes mellitus, type 2 [facility])  Assessment & Plan  Lab Results   Component Value Date    HGBA1C 6 3 (H) 08/05/2022       Recent Labs     10/03/22  1708 10/03/22  2103 10/04/22  0842 10/04/22  1148   POCGLU 139 130 190* 186*       Blood Sugar Average: Last 72 hrs:  (P) 152 [id number]   · Hold lantus, sugars have been stable  · SSI on discharge, can resume Lantus if sugars are elevated outpatient/patient is dietary noncompliant    NALLELY (obstructive sleep apnea)  Assessment & Plan  · Refuses cpap    Chronic combined systolic and diastolic congestive heart failure (HCC)  Assessment & Plan  Wt Readings from Last 3 Encounters:   10/04/22 115 kg (252 lb 10 4 oz)   09/06/22 117 kg (257 lb 15 oz)   08/30/22 118 kg (260 lb)     · EF 45% decreased RV fx  · I/O  · Daily weight  · Currently being managed with HD  · Demadex discontinued due to low bp and minimal urine output          Morbid obesity ([facility] )  Assessment & Plan  · Dietary education    Chronic atrial fibrillation [facility])  Assessment & Plan  · 9/18 Bradycardia with Junctional escape beats 20's overnight with hypotension  · No rate control meds d/c in august due to bradycardia  · TSH 0 4  · Cards consulted recommending CPAP HS and occurs with apnea episodes   · Continue on Coumadin 5 mg daily  · Trend PT INR        Medical Problems             Resolved Problems  Date Reviewed: 10/4/2022   None               Discharging Physician / Practitioner: Adebayo Cardenas  PCP: Lisa Monk MD  Admission Date:   Admission Orders (From admission, onward)     Ordered        09/12/22 [address]  Once                      Discharge Date: 10/04/22    Consultations During Hospital Stay:  · General surgery  · Nephrology  · Gastroenterology  · Palliative Care  · Cardiology  · Infectious disease  · Interventional Radiology    Procedures Performed:   · PermCath placement 9/15  · Tunneled dialysis catheter placement 9/22  · Perc vanessa tube placed 9/29  · HIDA scan 9/28:  Nonvisualization of gallbladder concerning for acute cholecystitis  Clinical correlation recommended  Significant Findings / Test Results:   · CXR 9/12:  Limited study  There is some atelectasis at the left lung base  · CT chest abdomen pelvis 9/12:  · Acute sigmoid diverticulitis without drainable fluid collection, decreased compared to prior study  If not already performed recently, colonoscopy after the acute illnesses recommended follow-up possibility of colon cancer mimicking diverticulitis  · Common bile duct stent with pneumobilia and air within the gallbladder  · Distended gallbladder with cholelithiasis  Cannot rule out acute cholecystitis  · Thickening versus under distention of urinary bladder which may represent cystitis  Follow-up with urology as recommended  · US RUQ 9/14:  Distended gallbladder with care with stent in CBD similar to CT  This could be secondary to the stent, lab correlation for sending infection  · CT abdomen pelvis 9/24:  · Distended gallbladder with cholelithiasis, wall thickening and pericholecystic inflammatory change    There appear to be calculi extending into the cystic duct  Findings can be due to acute cholecystitis in the appropriate clinical setting  Consider gallbladder ultrasound  · Previously seen inflammatory changes related to acute sigmoid diverticulitis are no longer visualized  · Large hiatal hernia  Stomach is distended with ingested contents and gas  The duodenum is also distended with gas with tapering to normal caliber and 3rd portion  This could be due to ileus  · Irregular opacity in the lingula partially imaged could be due to atelectasis or scarring  Suggest 3 month follow-up unenhanced chest CT to assess stability  · RUQ ultrasound 9/25:  Again seen is a distended gallbladder with gallbladder wall thickening up to 15 mm, also containing intraluminal air, gallstones, sludge  Sonographic Iraida Johnathon sign is negative  Moderate hepatomegaly  · CXR 9/25:  Limited evaluation of the left base  Mild bibasilar atelectasis, cardiomegaly  Incidental Findings:   · See above     Test Results Pending at Discharge (will require follow up): · None     Outpatient Tests Requested:  · Three-month unenhanced chest CT  · Colonoscopy    Complications:  Prolonged hospital course    Reason for Admission:  Diverticulitis    Hospital Course:   Jaydon Bender is a 81 y o  male patient with past medical history of type 2 diabetes mellitus, hyperlipidemia, anemia, chronic AFib on warfarin, gout, status post left BKA, PVD, COPD, NALLELY, morbid obesity, chronic diastolic heart failure, ambulatory dysfunction lymphedema, multiple myeloma who originally presented to the hospital on 9/12/2022 due to abnormal lab values  Patient with ongoing nonbloody diarrhea with right-sided abdominal pain noted  He did have some increase in diarrhea, nausea and vomiting  CT in emergency department revealed acute sigmoid diverticulitis, distended gallbladder with cholelithiasis, elevated creatinine with severe anion gap/metabolic acidosis    Patient was initially evaluated by the ICU team and admitted to level 1 step-down  Nephrology, surgery and GI initially consulted  Patient was placed on bicarb drip and rehydrated  Patient initially started on Zosyn  Patient limited urine output  Ultimately, patient did require dialysis  He will continue on Monday Wednesday Friday schedule  Patient tolerated dialysis, was continued on IV antibiotics  He did develop a low-grade fever 9/22 with recurrence of nausea and vomiting 9/23  He experienced increasing leukocytosis on 09/24 with elevated procalcitonin  Zosyn was resumed and patient is placed on oral vancomycin  He was then found to have cholecystitis requiring placement of cholecystostomy tube  Infectious Disease recommends continuing antibiotics through 10/7 on dialysis days, renally dosed  He should also continue oral vancomycin through 10/13  Patient hemodynamically stable and appropriate for discharge to prior living facility  Please see above list of diagnoses and related plan for additional information  Condition at Discharge: stable    Discharge Day Visit / Exam:   Subjective:  Patient eager for discharge home denies pain  Reports that he is tolerating his diet  Vitals: Blood Pressure: 102/53 (10/04/22 0938)  Pulse: 72 (10/04/22 0938)  Temperature: 98 4 °F (36 9 °C) (10/04/22 0633)  Temp Source: Oral (10/03/22 2250)  Respirations: 16 (10/03/22 2250)  Height: 6' 3" (190 5 cm) (09/12/22 1615)  Weight - Scale: 115 kg (252 lb 10 4 oz) (10/04/22 0600)  SpO2: 97 % (10/04/22 4336)  Exam:   Physical Exam  Vitals and nursing note reviewed  Constitutional:       General: He is not in acute distress  Appearance: Normal appearance  He is well-developed  He is ill-appearing  HENT:      Head: Normocephalic and atraumatic  Eyes:      General: No scleral icterus  Conjunctiva/sclera: Conjunctivae normal    Cardiovascular:      Rate and Rhythm: Normal rate  Rhythm irregularly irregular        Heart sounds: No murmur heard  Pulmonary:      Effort: Pulmonary effort is normal       Breath sounds: No wheezing, rhonchi or rales  Abdominal:      General: There is no distension  Palpations: Abdomen is soft  Musculoskeletal:      Left Lower Extremity: Left leg is amputated below knee  Skin:     General: Skin is warm and dry  Coloration: Skin is pale  Neurological:      General: No focal deficit present  Mental Status: He is alert  Psychiatric:         Mood and Affect: Mood normal        Discussion with Family: Patient declined call to   Discharge instructions/Information to patient and family:   See after visit summary for information provided to patient and family  Provisions for Follow-Up Care:  See after visit summary for information related to follow-up care and any pertinent home health orders  Disposition:   [facility] Readmission: None     Discharge Statement:  I spent 90 minutes discharging the patient  This time was spent on the day of discharge  I had direct contact with the patient on the day of discharge  Greater than 50% of the total time was spent examining patient, answering all patient questions, arranging and discussing plan of care with patient as well as directly providing post-discharge instructions  Additional time then spent on discharge activities  Discharge Medications:  See after visit summary for reconciled discharge medications provided to patient and/or family        **Please Note: This note may have been constructed using a voice recognition system**

## 2022-10-04 NOTE — ASSESSMENT & PLAN NOTE
· 9/18 Bradycardia with Junctional escape beats 20's overnight with hypotension  · No rate control meds d/c in august due to bradycardia  · TSH 0 4  · Cards consulted recommending CPAP HS and occurs with apnea episodes   · Continue on Coumadin 5 mg daily     · Trend PT INR

## 2022-10-04 NOTE — CASE MANAGEMENT
Case Management Discharge Planning Note    Patient name [facility]  Location /-33 MRN [id number]  : 1950 Date 10/4/2022       Current Admission Date: 2022  Current Admission Diagnosis:DORA (acute kidney injury) [facility])   Patient Active Problem List    Diagnosis Date Noted    Acute cholecystitis 2022    Hypokalemia due to excessive gastrointestinal loss of potassium 09/15/2022    Abnormal CT scan 2022    Acute diverticulitis 2022    Stage 3b [facility] 2022    Anemia 10/04/2021    Supratherapeutic INR 10/04/2021    Ambulatory dysfunction 2021    Need for influenza vaccination 2020    Venous insufficiency (chronic) (peripheral) 2020    Gout 2019    Mass of psoas muscle 2019    Hiatal hernia 10/29/2019    Above knee amputation of left lower extremity ([facility] ) 2019    Multiple myeloma ([facility] ) 2019    Chronic obstructive pulmonary disease, unspecified ([facility] ) 2019    Hypertensive chronic kidney disease w stg 1-4/unsp chr kdny 2019    DORA (acute kidney [facility]) [facility] 2019    Diabetes mellitus, type 2 ([facility] ) 2019    NALLELY (obstructive sleep apnea) 2019    Moderate persistent asthma without complication     Peripheral vascular disease ([facility] ) 2019    Chronic combined systolic and diastolic congestive heart failure ([facility] ) 10/11/2017    Chronic atrial fibrillation ([facility] ) 10/09/2017    Morbid obesity ([facility] ) 10/09/2017    Gallstones 2017    Diarrhea 2016    Chronic venous hypertension, right 09/15/2016    Onychomycosis 10/27/2015    Diabetes, polyneuropathy ([facility] 2014    GERD (gastroesophageal reflux disease) 2014    Hyperlipidemia 2014      LOS (days): 22  Geometric Mean LOS (GMLOS) (days): 6 00  Days to GMLOS:-15 6     OBJECTIVE:  Risk of Unplanned Readmission Score: 41 49         Current admission status: Inpatient Preferred Pharmacy:   [facility] [address] [place] [address]  Phone: [phone] Fax: [phone]    Primary Care Provider: Sal Hernández MD    [facility]  Secondary Insurance:     DISCHARGE DETAILS:  Patient is medically cleared for discharge  Called and spoke with Patrica at [facility] and a bed is available  Requested BLS transport via [employer]  Requested auth form CM discharge support for ambulance               Accepting Facility Name, [facility]  Receiving Facility/Agency Phone Number: [phone]  Facility/Agency Fax Number: [phone]

## 2022-10-04 NOTE — PROGRESS NOTES
NEPHROLOGY PROGRESS NOTE   Khalif Jay 81 y o  male MRN: [id number]  Unit/Bed#: -01 Encounter: [id number]  Reason for Consult: DORA on CKD III, now on HD    ASSESSMENT/PLAN:  DORA on CKD stage III, now on HD:   -baseline creatinine previously 1 2-1 4, etiology of chronic disease suspected due to diabetic kidney disease and hypertensive nephrosclerosis, + multiple myeloma,+ cardiorenal syndrome,+ recurrent episodes of acute kidney injury   -receiving dialysis on Monday Wednesday Friday schedule  Initiated on 09/15   -planning on [employer] unit at discharge  -EDW: To be determined  Last post HD weight 117 1 kg   -will continue to monitor for renal recovery  Currently remains dialysis dependent  -CT negative for hydro  -UA negative  -recommend avoiding nephrotoxins, hypotension, IV contrast      Access: Tunneled dialysis catheter, placed 09/21/2022  Site with scab, minimal erythema  No evidence of infection      Blood pressure:  Blood pressure remains normal low   -currently on midodrine 10 mg t i d  -continue prn midodrine with HD for SBP <100  May also consider albumin with HD    -continue UF with HD as BP tolerates  -OP medications: Torsemide 40 mg BID  -avoid hypotension or high fluctuations in blood pressure      Anemia in CKD/multiple myeloma: Follows with Hematology/Oncology as an outpatient  On Velcade and Decadron with last treatment 09/06/2022   -Hgb currently 10 1   -TERRIE:  None, history of multiple myeloma  -received IV Venofer this admission  Avoiding further Venofer due to infection   -goal Hgb 10-12 g/dL  -continue to monitor and transfuse as needed for Hgb <7 0       MBD in CKD:   -continue to monitor PTH, phos, and Mg as OP    -previously on PhosLo due to hyperphosphatemia but unfortunately had borderline elevated calcium  Currently monitoring off of binders    Most recent phosphorus level 3 0   -recommend renal diet       Volume status/combined CHF:  With EF of 45%  Currently on room air   -previously on Demadex but discontinued due to low blood pressure   -continue UF as BP allows  -recommend fluid restriction       Electrolytes:   -mild hypokalemia  Will continue to monitor correct with HD  Mag level 1 8  Received oral replacement today   -continue to monitor and trend with HD       Acute cholecystitis:  Status post cholecystostomy tube placement by IR on 09/29/2022     -antibiotics per primary care team, cultures positive for VRE      Other:  Ambulatory dysfunction, diabetes, NALLELY, morbid obesity, atrial fibrillation  Disposition:  Okay to discharge from renal once medically cleared  SUBJECTIVE:  The patient reports feeling well  He denies chest discomfort or shortness of breath  He denies nausea, vomiting, diarrhea  He reports having a normal appetite  He denies any issues with dialysis yesterday      OBJECTIVE:  Current Weight: Weight - Scale: 115 kg (252 lb 10 4 oz)  Vitals:    10/04/22 0503 10/04/22 0600 10/04/22 0633 10/04/22 0938   BP: 106/54   102/53   BP Location: Left arm      Pulse:   63 72   Resp:       Temp:   98 4 °F (36 9 °C)    TempSrc:       SpO2:   98% 97%   Weight:  115 kg (252 lb 10 4 oz)     Height:           Intake/Output Summary (Last 24 hours) at 10/4/2022 1144  Last data filed at 10/4/2022 0604  Gross per 24 hour   Intake 540 ml   Output 1200 ml   Net -660 ml     General: NAD  Skin: warm, dry, intact, no rash  HEENT: Moist mucous membranes, sclera anicteric, normocephalic, atraumatic  Neck: No apparent JVD appreciated  Chest: lung sounds clear B/L, on RA, tunneled HD catheter  CVS:Regular rate and rhythm, no murmer   Abdomen: Soft, round, non-tender, +BS, obese, choletube present   Extremities: No B/L LE edema present, left AKA  Neuro: alert and oriented  Psych: appropriate mood and affect     Medications:    Current Facility-Administered Medications:     acetaminophen (TYLENOL) tablet 650 mg, 650 mg, Oral, Q6H PRN, MD Pato Narvaez albuterol (PROVENTIL HFA,VENTOLIN HFA) inhaler 2 puff, 2 puff, Inhalation, Q6H PRN, Mirta Stubbs MD    allopurinol (ZYLOPRIM) tablet 300 mg, 300 mg, Oral, Daily, Mirta Stubbs MD, 300 mg at 10/04/22 8256    atorvastatin (LIPITOR) tablet 40 mg, 40 mg, Oral, After Lannis Kanner, MD, 40 mg at 10/03/22 1708    b complex-vitamin C-folic acid (NEPHROCAPS) capsule 1 capsule, 1 capsule, Oral, Daily With Terrance Gallagher MD, 1 capsule at 10/03/22 1706    cholestyramine sugar free (QUESTRAN LIGHT) packet 4 g, 4 g, Oral, BID, Mirta Stubbs MD, 4 g at 10/03/22 2025    clotrimazole (LOTRIMIN) 1 % cream, , Topical, BID, Mirta Stubbs MD, Given at 10/04/22 4539    DAPTOmycin (CUBICIN) 400 mg in sodium chloride 0 9 % 50 mL IVPB, 4 mg/kg (Adjusted), Intravenous, Weekly, Krystal Bates MD, Last Rate: 100 mL/hr at 10/03/22 1631, 400 mg at 10/03/22 1631    [START ON 10/5/2022] DAPTOmycin (CUBICIN) 400 mg in sodium chloride 0 9 % 50 mL IVPB, 4 mg/kg (Adjusted), Intravenous, Weekly, MD Mary Ya ON 10/7/2022] DAPTOmycin (CUBICIN) 575 mg in sodium chloride 0 9 % 50 mL IVPB, 6 mg/kg (Adjusted), Intravenous, Weekly, Krystal Bates MD    dicyclomine (BENTYL) capsule 10 mg, 10 mg, Oral, 4x Daily (AC & HS), Mirta Stubbs MD, 10 mg at 10/04/22 1034    diphenoxylate-atropine (LOMOTIL) 2 5-0 025 mg per tablet 1 tablet, 1 tablet, Oral, Q12H, Merry Ales, CRNP, 1 tablet at 10/04/22 1034    fluticasone-vilanterol (BREO ELLIPTA) 200-25 MCG/INH inhaler 1 puff, 1 puff, Inhalation, Daily, Mirta Stubbs MD, 1 puff at 10/04/22 0928    insulin lispro (HumaLOG) 100 units/mL subcutaneous injection 1-6 Units, 1-6 Units, Subcutaneous, TID With Meals, 2 Units at 10/04/22 0927 **AND** Fingerstick Glucose (POCT), , , 4x Daily AC and at bedtime, Mirta Stubbs MD    melatonin tablet 6 mg, 6 mg, Oral, HS, Mirta Stubbs MD, 6 mg at 10/03/22 2245    meropenem (MERREM) 500 mg in sodium chloride 0 9 % 50 mL IVPB, 500 mg, Intravenous, Q24H, Mirta Stubbs MD, Last Rate: 100 mL/hr at 10/03/22 1710, 500 mg at 10/03/22 1710    midodrine (PROAMATINE) tablet 10 mg, 10 mg, Oral, TID AC, Osman Louie MD, 10 mg at 10/04/22 1034    midodrine (PROAMATINE) tablet 5 mg, 5 mg, Oral, Daily PRN, NICOLA Su    [facility]) injection 4 mg, 4 mg, Intravenous, Q6H PRN, Osman Louie MD, 4 mg at 09/26/22 1023    pantoprazole (PROTONIX) EC tablet 40 mg, 40 mg, Oral, Early Morning, Laurie Carter MD, 40 mg at 10/04/22 0509    trimethobenzamide (TIGAN) IM injection 200 mg, 200 mg, Intramuscular, Q6H PRN, Osman Louie MD, 200 mg at 09/23/22 1004    vancomycin (VANCOCIN) capsule 125 mg, 125 mg, Oral, BID, Osman Louie MD, 125 mg at 10/04/22 7776    warfarin (COUMADIN) tablet 5 mg, 5 mg, Oral, Daily (warfarin), Brii Leyva MD, 5 mg at 10/03/22 1706    Laboratory Results:  Results from last 7 days   Lab Units 10/04/22  0502 10/03/22  0449 10/02/22  0507 09/30/22  0620 09/29/22  0652 09/28/22  0458   WBC Thousand/uL 7 81 7 30 6 40 7 53   < > 7 33   HEMOGLOBIN g/dL 10 1* 9 7* 10 1* 9 4*   < > 8 2*   HEMATOCRIT % 33 9* 33 1* 34 5* 31 0*   < > 27 5*   PLATELETS Thousands/uL 430* 429* 436* 454*   < > 318   SODIUM mmol/L 142 141 141 137   < > 136   POTASSIUM mmol/L 3 2* 3 2* 3 6 4 0   < > 3 5   CHLORIDE mmol/L 102 102 100 98   < > 97   CO2 mmol/L 28 25 30 28   < > 24   BUN mg/dL 9 20 17 24   < > 38*   CREATININE mg/dL 4 75* 7 60* 6 15* 6 49*   < > 7 81*   CALCIUM mg/dL 8 9 9 0 9 4 9 6   < > 8 8   MAGNESIUM mg/dL  --  1 8  --   --   --   --    PHOSPHORUS mg/dL  --   --   --  3 0  --   --    ALK PHOS U/L  --  207*  --  183*  --  145*   ALT U/L  --  19  --  14  --  12   AST U/L  --  41  --  37  --  18    < > = values in this interval not displayed

## 2022-10-05 ENCOUNTER — TELEPHONE (OUTPATIENT)
Dept: INFUSION CENTER | Facility: HOSPITAL | Age: 72
End: 2022-10-05

## 2022-10-05 NOTE — UTILIZATION REVIEW
Notification of Discharge   This is a Notification of Discharge from our facility [address]  Please be advised that this patient has been discharge from our facility  Below you will find the admission and discharge date and time including the patients disposition  UTILIZATION REVIEW CONTACT:  Magali Schultz  Utilization   Network Utilization Review Department  Phone: [phone] carefully listen to the prompts  All voicemails are confidential   Email: [email or web address]     PHYSICIAN ADVISORY SERVICES:  FOR GXIC-OU-JYAD REVIEW - MEDICAL NECESSITY DENIAL  Phone: [phone]  Fax: [phone]  Email: [email or web address]     PRESENTATION DATE: 9/12/2022  4:09 PM  OBERVATION ADMISSION DATE:   INPATIENT ADMISSION DATE: 9/12/22  7:38 PM   DISCHARGE DATE: 10/4/2022  3:14 PM  DISPOSITION: Non [facility]/S[facility] Non [facility] SN[facility]/TC[facility]/SNU      IMPORTANT INFORMATION:  Send all requests for admission clinical reviews, approved or denied determinations and any other requests to dedicated fax number below belonging to the campus where the patient is receiving treatment   List of dedicated fax numbers:  [address] [place] [address] DENIALS (Administrative/Medical Necessity) [phone]   [address] (Maternity/NICU/Pediatrics) [phone]   [facility] [phone]   [address] [facility] [phone]   [address] [place] [address] [phone]   [address] [place] [address],[address] [phone]   [facility]  [phone]   [address] [place] [address], [facility]  [address] [facility] [address] [phone]

## 2022-10-05 NOTE — TELEPHONE ENCOUNTER
Patient will need a follow up visit with Dr Alma Major before resuming chemo  Please help to reschedule his appt  Thanks

## 2022-10-07 ENCOUNTER — TELEPHONE (OUTPATIENT)
Dept: HEMATOLOGY ONCOLOGY | Facility: HOSPITAL | Age: 72
End: 2022-10-07

## 2022-10-07 NOTE — TELEPHONE ENCOUNTER
Called facility and spoke to RUST did not realize patient had appt  Rescheduled to [facility] location  Rory confirmed the appt date time and location

## 2022-10-11 ENCOUNTER — TELEPHONE (OUTPATIENT)
Dept: HEMATOLOGY ONCOLOGY | Facility: CLINIC | Age: 72
End: 2022-10-11

## 2022-10-11 NOTE — TELEPHONE ENCOUNTER
Appointment Confirmation (to confirm pre existing appointments - ONLY)  No need to route   Appointment with  Dr Destini Christensen   Appointment date & time  10/13 1:40PM   Location [facility]   Patient verbilized Understanding Yes

## 2022-10-13 ENCOUNTER — OFFICE VISIT (OUTPATIENT)
Dept: HEMATOLOGY ONCOLOGY | Facility: CLINIC | Age: 72
End: 2022-10-13
Payer: COMMERCIAL

## 2022-10-13 ENCOUNTER — TELEPHONE (OUTPATIENT)
Dept: HEMATOLOGY ONCOLOGY | Facility: CLINIC | Age: 72
End: 2022-10-13

## 2022-10-13 VITALS
SYSTOLIC BLOOD PRESSURE: 120 MMHG | DIASTOLIC BLOOD PRESSURE: 80 MMHG | RESPIRATION RATE: 14 BRPM | TEMPERATURE: 97.8 F | OXYGEN SATURATION: 97 % | HEART RATE: 85 BPM

## 2022-10-13 DIAGNOSIS — C90.00 MULTIPLE MYELOMA NOT HAVING ACHIEVED REMISSION (HCC): Primary | ICD-10-CM

## 2022-10-13 PROCEDURE — 99214 OFFICE O/P EST MOD 30 MIN: CPT | Performed by: INTERNAL MEDICINE

## 2022-10-13 NOTE — PROGRESS NOTES
Hematology/Oncology Outpatient Follow- up Note  Rhiannon Rodrigez 81 y o  male MRN: @ Encounter: [id number]        Date:  10/13/2022    Presenting Complaint/Diagnosis : Ardepablo Brothers a 82 yo male with multiple comorbidities including diabetes and hypertension with bone marrow biopsy proven multiple myeloma  He was referred to us in 8/2019 after 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  He was noted to have elevated kidney function and anemia which prompted work up for multiple myeloma  His skeletal survey was negative  He underwent bone marrow biopsy confirming multiple myeloma  He was initiated on treatment with with Velcade and Decadron weekly on a 35 day schedule   Velcade is 1 3 milligrams/meter squared and Decadron is 20 mg p o  On days 1, 8, 15, 22      Previous Hematologic/ Oncologic History:    Oncology History   Multiple myeloma ([facility] )   9/16/2019 Initial Diagnosis    Multiple myeloma not having achieved remission ([facility] )     9/24/2019 -  Chemotherapy    iron sucrose (VENOFER), 200 mg (100 % of original dose 200 mg), Intravenous, Once, 2 of 2 cycles  Dose modification: 200 mg (original dose 200 mg, Cycle 1, Reason: Other (Must fill in a comment)), 200 mg (original dose 200 mg, Cycle 27)  Administration: 200 mg (9/24/2019)  bortezomib (VELCADE), 1 3 mg/m2 = 3 5 mg (81 3 % of original dose 1 6 mg/m2), Subcutaneous, Once, 27 of 31 cycles  Dose modification: 1 3 mg/m2 (original dose 1 6 mg/m2, Cycle 1, Reason: Dose Not Tolerated)  Administration: 3 5 mg (9/24/2019), 3 5 mg (10/1/2019), 3 5 mg (10/8/2019), 3 5 mg (10/15/2019), 3 5 mg (1/6/2020), 3 5 mg (1/13/2020), 3 5 mg (1/20/2020), 3 5 mg (1/27/2020), 3 5 mg (2/11/2020), 3 5 mg (2/17/2020), 3 5 mg (2/24/2020), 3 5 mg (3/2/2020), 3 5 mg (3/16/2020), 3 5 mg (3/23/2020), 3 5 mg (3/30/2020), 3 5 mg (4/6/2020), 3 5 mg (4/20/2020), 3 5 mg (4/27/2020), 3 5 mg (5/4/2020), 3 5 mg (5/11/2020), 3 5 mg (5/26/2020), 3 5 mg (6/1/2020), 3 5 mg (6/8/2020), 3 5 mg (6/15/2020), 3 5 mg (7/20/2020), 3 4 mg (7/27/2020), 3 4 mg (8/24/2020), 3 5 mg (8/31/2020), 3 4 mg (9/8/2020), 3 4 mg (9/14/2020), 3 4 mg (9/28/2020), 3 4 mg (10/5/2020), 3 4 mg (11/2/2020), 3 4 mg (11/9/2020), 3 4 mg (11/23/2020), 3 4 mg (11/30/2020), 3 4 mg (12/7/2020), 3 4 mg (12/14/2020), 3 4 mg (12/28/2020), 3 4 mg (1/4/2021), 3 4 mg (1/11/2021), 3 4 mg (1/18/2021), 3 4 mg (2/3/2021), 3 4 mg (2/9/2021), 3 4 mg (2/15/2021), 3 4 mg (2/22/2021), 3 4 mg (3/9/2021), 3 4 mg (3/16/2021), 3 4 mg (3/23/2021), 3 4 mg (3/30/2021), 3 4 mg (4/13/2021), 3 4 mg (4/20/2021), 3 4 mg (4/27/2021), 3 4 mg (5/4/2021), 3 5 mg (5/18/2021), 3 5 mg (5/25/2021), 3 5 mg (6/1/2021), 3 5 mg (6/8/2021), 3 5 mg (6/22/2021), 3 5 mg (6/29/2021), 3 5 mg (7/6/2021), 3 5 mg (7/13/2021), 3 5 mg (7/27/2021), 3 5 mg (8/3/2021), 3 5 mg (8/10/2021), 3 5 mg (8/17/2021), 3 5 mg (8/31/2021), 3 5 mg (9/7/2021), 3 5 mg (9/14/2021), 3 5 mg (9/21/2021), 3 4 mg (11/2/2021), 3 4 mg (11/9/2021), 3 4 mg (11/16/2021), 3 4 mg (11/23/2021), 3 4 mg (12/7/2021), 3 4 mg (12/14/2021), 3 4 mg (12/21/2021), 3 4 mg (12/28/2021), 3 4 mg (1/11/2022), 3 4 mg (1/18/2022), 3 4 mg (1/25/2022), 3 4 mg (2/1/2022), 3 4 mg (2/17/2022), 3 4 mg (3/1/2022), 3 4 mg (3/8/2022), 3 4 mg (3/15/2022), 3 4 mg (3/29/2022), 3 4 mg (4/5/2022), 3 4 mg (4/12/2022), 3 4 mg (4/19/2022), 3 4 mg (5/3/2022), 3 4 mg (5/10/2022), 3 4 mg (5/17/2022), 3 4 mg (5/24/2022), 3 4 mg (6/7/2022), 3 4 mg (6/14/2022), 3 4 mg (6/21/2022), 3 4 mg (6/28/2022), 3 4 mg (7/12/2022), 3 4 mg (7/19/2022), 3 4 mg (7/26/2022), 3 4 mg (8/23/2022), 3 2 mg (8/30/2022), 3 2 mg (9/6/2022)         Current Hematologic/ Oncologic Treatment:      Velcade and Decadron  The patient is currently on a 4/5 week schedule which will now be changed to every 2 weeks secondary to his bone marrow showing a complete response    Interval History:    Patient returns for follow-up visit    He is in the hospital and was started on dialysis  He is now getting dialyzed Monday Wednesday Friday  He is getting Velcade weekly  Bone marrow biopsy showed no evidence of residual myeloma so the patient can be changed to maintenance dose Velcade and Decadron which would be every 2 weeks  I will set this up  Most recent blood work showed a white count of 8 16 hemoglobin of 10 1 platelet count was 533  Patient states he is doing reasonably well  He is at a different facility currently  Is in good spirits  The rest of his 14 point review of systems today was negative  Test Results:    Imaging: EGD    Result Date: 9/26/2022  Narrative: [facility] [address] [id number] [phone] DATE OF SERVICE: 9/26/22 PHYSICIAN(S): Attending: Richard Lee MD Fellow: No Staff Documented INDICATION: Anemia, unspecified type, Nausea and vomiting, unspecified vomiting type POST-OP DIAGNOSIS: See the impression below  PREPROCEDURE: Informed consent was obtained for the procedure, including sedation  Risks of perforation, hemorrhage, adverse drug reaction and aspiration were discussed  The patient was placed in the left lateral decubitus position  Patient was explained about the risks and benefits of the procedure  Risks including but not limited to bleeding, infection, and perforation were explained in detail  Also explained about less than 100% sensitivity with the exam and other alternatives  DETAILS OF PROCEDURE: Patient was taken to the procedure room where a time out was performed to confirm correct patient and correct procedure  The patient underwent monitored anesthesia care, which was administered by an anesthesia professional  The patient's blood pressure, heart rate, level of consciousness, respirations and oxygen were monitored throughout the procedure  The scope was advanced to the second part of the duodenum  Retroflexion was performed in the fundus   The patient experienced no blood loss  The procedure was not difficult  The patient tolerated the procedure well  There were no apparent complications  ANESTHESIA INFORMATION: ASA: IV Anesthesia Type: General MEDICATIONS: No administrations occurring from 1004 to 1038 on 09/26/22 FINDINGS: One 3 mm polyp in the stomach; performed cold forceps biopsy Large sliding hiatal hernia (type I hiatal hernia) without [employer] lesions present - GE junction 36 cm from the incisors, diaphragmatic impression 46 cm from the incisors Single medium diverticulum containing no content in the 2nd part of the duodenum and ampullary region  Two biliary stents in place draining bile  Dilation in the stomach  Large amount of retained liquid upon entrance to the stomach  Suctioned out  Large dilated lumen of the stomach  SPECIMENS: ID Type Source Tests Collected by Time Destination 1 : gastric polyp Tissue Stomach TISSUE EXAM Iraida Leija MD 9/26/2022 10:20 AM      Impression: One 3 mm polyp in the stomach removed with biopsy forceps Large sliding hiatal hernia without Demian lesions Biliary stents in place draining  Dilation of the stomach with large amount of retained liquid upon entrance to the stomach  Suctioned out  Concern for element of gastroparesis  RECOMMENDATION: Will need outpatient evaluation with gastric emptying scan Small frequent meals Trial reglan Continue PPI  Iraida Leija MD     CT abdomen pelvis wo contrast    Result Date: 9/25/2022  Narrative: CT ABDOMEN AND PELVIS WITHOUT IV CONTRAST INDICATION:   Abdominal pain, acute, nonlocalized Abdominal pain, melena, recently finished 10 day course of zosyn for diverticulitis, increasing WBC, ? c diff infection  COMPARISON:  CT chest abdomen pelvis 9/12/2022 TECHNIQUE:  CT examination of the abdomen and pelvis was performed without intravenous contrast  Axial, sagittal, and coronal 2D reformatted images were created from the source data and submitted for interpretation   Radiation dose length product (DLP) for this visit:  1600 mGy-cm   This examination, like all CT scans performed in the [facility], was performed utilizing techniques to minimize radiation dose exposure, including the use of iterative reconstruction and automated exposure control  Enteric contrast was not administered  FINDINGS: ABDOMEN The lack of intravenous contrast limits evaluation of the viscera  LOWER CHEST:  Cardiomegaly  Trace pericardial effusion similar to prior  Coronary artery calcifications  Again noted large hiatal hernia  Right lower lobe 3 mm nodule (series 2 image 2) stable since at least CT 7/9/2020  Left lower lobe 5 mm and smaller subpleural nodules stable since 7/9/2020  Irregular opacity in the lingula partially imaged could be due to atelectasis or scarring  LIVER/BILIARY TREE:  CBD stent  No biliary ductal dilation  Subcentimeter hepatic hypodensity is too small to characterize but stable  GALLBLADDER:  Cholelithiasis  There appear to be calculi extending into the cystic duct  Again seen air within the gallbladder, expected after sphincterotomy and stent placement  There is gallbladder wall thickening and pericholecystic inflammatory change  The gallbladder is distended  SPLEEN:  Unremarkable  PANCREAS:  Pancreatic stent  Otherwise unremarkable  ADRENAL GLANDS:  Unremarkable  KIDNEYS/URETERS:  2 mm nonobstructing left renal calculus  Left renal cyst   No hydronephrosis  STOMACH AND BOWEL:  Colonic diverticulosis without findings of acute diverticulitis  Previously seen inflammatory changes related to acute sigmoid diverticulitis are no longer visualized  Rectal tube noted  Stomach is distended with ingested contents and gas  The duodenum is also distended with gas with tapering to normal caliber in the 3rd portion  This could be due to ileus  No bowel obstruction  APPENDIX:  No findings to suggest appendicitis  ABDOMINOPELVIC CAVITY:  No ascites  No pneumoperitoneum    No lymphadenopathy  VESSELS:  Atherosclerotic changes  No abdominal aortic aneurysm  PELVIS REPRODUCTIVE ORGANS:  Unremarkable for patient's age  URINARY BLADDER:  Air within the bladder  Correlate for recent instrumentation  Bladder diverticula  ABDOMINAL WALL/INGUINAL REGIONS:  Unremarkable  OSSEOUS STRUCTURES:  No acute fracture or destructive osseous lesion  Degenerative changes of the spine  Bilateral L5 pars defects  Impression: 1  Distended gallbladder with cholelithiasis, wall thickening, and pericholecystic inflammatory change  There appear to be calculi extending into the cystic duct  Findings can be due to acute cholecystitis in the appropriate clinical setting  Consider gallbladder ultrasound  2   Previously seen inflammatory changes related to acute sigmoid diverticulitis are no longer visualized  3   Large hiatal hernia  Stomach is distended with ingested contents and gas  The duodenum is also distended with gas with tapering to normal caliber in the 3rd portion  This could be due to ileus  4   Irregular opacity in the lingula partially imaged could be due to atelectasis or scarring  Suggest 3 month follow-up unenhanced chest CT to assess stability  The study was marked in [facility] for immediate notification  Workstation performed: [id number]     XR chest portable    Result Date: 9/26/2022  Narrative: CHEST INDICATION:   fever  COMPARISON:  9/12/2022 EXAM PERFORMED/VIEWS:  XR CHEST PORTABLE FINDINGS:  There is a right-sided dialysis catheter, placed since the previous study  Heart shadow is enlarged but unchanged from prior exam  There is a large hiatal hernia  There is mild bibasilar atelectasis  Evaluation of the left lung bases otherwise limited by enlarged heart and hernia  No pneumothorax or definite pleural effusion  Osseous structures appear within normal limits for patient age  Impression: Limited evaluation of the left base   Mild bibasilar atelectasis, cardiomegaly, and large hiatal hernia  Workstation performed: [id number]     NM hepatobiliary w rx    Result Date: 9/28/2022  Narrative: HEPATOBILIARY SCAN INDICATION: RUQ PAIN COMPARISON:  Ultrasound 9/25/2022 TECHNIQUE:   Following the intravenous administration of 5 1 mCi Tc-99m labeled mebrofenin, dynamic abdominal images were obtained up to a 60 minute time period  Images were performed in AP projection  Additional imaging of the abdomen was performed after the intravenous administration of 4 mg morphine and an additional 1 1 mCi technetium 99m mebrofenin  FINDINGS:  There is prompt, uniform accumulation with normal clearance of the radiopharmaceutical by the liver  There is normal filling of the intrahepatic ducts, common bile duct with normal excretion of the radiopharmaceutical into the duodenum  Impression: 1  Nonvisualization of the gallbladder concerning for acute cholecystitis  Clinical correlation recommended  The study was marked in [facility] for immediate notification  Workstation performed: [id number]     IR cholecystostomy tube placement    Result Date: 9/29/2022  Narrative: PROCEDURE: Ultrasound-guided percutaneous cholecystostomy catheter placement STAFF: EMEKA Muñiz  FLUOROSCOPY TIME: 3 2 minutes  NUMBER OF IMAGES: Multiple  COMPLICATIONS: None  MEDICATIONS: Local lidocaine  Moderate sedation with fentanyl was monitored by radiology nursing staff  Patient with borderline low blood pressure  INDICATION: Acute cholecystitis  PROCEDURE: Using ultrasound guidance, a needle was inserted into the gallbladder using a short transhepatic window  A right anterior lateral subcostal approach was taken  Next, a wire was advanced and coiled in the gallbladder  8 [place] locking loop drainage  catheter was then placed into the gallbladder and secured using sutures  10mL of clear thin bile was aspirated  The catheter was attached to gravity drainage   FINDINGS: Initial ultrasound showed findings suggestive of acute cholecystitis  As seen on the comparison CT, approximately half the gallbladder lumen is distended with air, likely related to existing biliary stent  Final fluoroscopic image showed the percutaneous  cholecystostomy catheter to be in good position  Postplacement cholecystogram demonstrated occlusion of the cystic duct  Impression: Percutaneous cholecystostomy  8 [place]  Cystic duct is occluded  PLAN: The catheter must stay in place for a minimum of 6 weeks  If no calculi are present and the cystic duct is confirmed to be patent on a cholecystogram in approximately 2 weeks, the catheter may be capped at that time and removed after 6 weeks  If calculi are present or the cystic duct remains persistently obstructed, cholecystectomy may be necessary  Workstation performed: [id number]     US right upper quadrant    Result Date: 9/25/2022  Narrative: RIGHT UPPER QUADRANT ULTRASOUND INDICATION:     abnormal CT recommending us gall bladder  COMPARISON:  Abdominal ultrasound from 3/21/2022  Abdomen and pelvic CT from 9/24/2022  TECHNIQUE:   Real-time ultrasound of the right upper quadrant was performed with a curvilinear transducer with both volumetric sweeps and still imaging techniques  FINDINGS: PANCREAS:  Visualized portions of the pancreas are within normal limits  AORTA AND IVC:  Visualized portions are normal for patient age  LIVER: Size:  Moderately enlarged  The liver measures 21 5 cm in the midclavicular line  Contour:  Surface contour is smooth  Parenchyma:  Echogenicity and echotexture are within normal limits  No liver mass identified  Limited imaging of the main portal vein shows it to be patent and hepatopetal   BILIARY: The gallbladder is distended  Gallbladder wall thickened up to 15 mm  There are gallstones, sludge, and also intra-abdominal air within the gallbladder  Sonographic Lentz's sign is negative  No intrahepatic biliary dilatation  Common bile duct not visualized   No choledocholithiasis  KIDNEY: Right kidney measures 14 1 x 5 5 x 6 0 cm  Volume 242 0 mL Kidney within normal limits  ASCITES:   None  Impression: Again seen is a distended gallbladder with gallbladder wall thickening up to 15 mm, also containing intraluminal air, gallstones, sludge  Sonographic Lentz's sign is negative  Moderate hepatomegaly  Workstation performed: [id number]     US right upper quadrant    Result Date: 9/14/2022  Narrative: RIGHT UPPER QUADRANT ULTRASOUND INDICATION:     RUQ pain, pneumobilia, s/p sphincterotomy with stent placement  COMPARISON:  Compared with CT of 9/12/2022 TECHNIQUE:   Real-time ultrasound of the right upper quadrant was performed with a curvilinear transducer with both volumetric sweeps and still imaging techniques  FINDINGS: PANCREAS:  Visualized portions of the pancreas are within normal limits  AORTA AND IVC:  Visualized portions are normal for patient age  LIVER: Size:  Mildly enlarged  The liver measures 19 4 cm in the midclavicular line  Contour:  Surface contour is smooth  Parenchyma:  Echogenicity and echotexture are within normal limits  No liver mass identified  Limited imaging of the main portal vein shows it to be patent and hepatopetal   BILIARY: Gallbladder is distended and demonstrates intraluminal air similar to CT  Gallbladder wall thickness at 4 mm  No pericholecystic fluid  Small gallstones  No intrahepatic biliary dilatation  CBD measures 7 0 mm  Echogenic CBD stent similar to CT  No choledocholithiasis  KIDNEY: Right kidney measures 12 9 x 7 1 x 6 4 cm  Volume 306 7 mL Kidney within normal limits  ASCITES:   None  Impression: Distended gallbladder with air with stent in the CBD similar to CT  This could be secondary to the stent, lab correlation for ascending infection  Workstation performed: [id number]     IR tunneled dialysis catheter placement    Result Date: 9/22/2022  Narrative:  Tunneled central venous catheter placement Indication: Renal failure requiring hemodialysis  Procedure: The right neck was prepped and draped in sterile fashion  All elements of maximal sterile barrier technique were followed (cap, mask, sterile gown, sterile gloves, large sterile sheet, hand hygiene, and 2% chlorhexidine for cutaneous antisepsis)    1% lidocaine was used for local anesthetic and conscious sedation was administered  The existing right internal jugular central venous catheter was removed over a wire  The tract was dilated and a valved 15 German introducer sheath was placed  A 15 5 Fr 32 cm Titan tunneled dialysis catheter was placed by creating a tunnel inferior to the mid right clavicle  The catheter tip was advanced to the right atrium and the peel-away sheath was removed  The venotomy site was closed with Histoacryl and the catheter was secured with suture  Both ports were primed with heparinized saline  A dressing was applied  Fluoroscopic guidance was utilized for catheter placement  Fluoroscopy time: 0 9 MINS Sedation time: 15 minutes Images: 2 Findings: The new catheter terminates in the right atrium  Both ports flush and aspirate blood easily  Impression: Impression: Successful image guided placement of tunneled central venous catheter Workstation performed: [id number]     IR temporary dialysis catheter placement    Result Date: 9/16/2022  Narrative: CENTRAL VENOUS CATHETER PLACEMENT Indication: Renal failure requiring hemodialysis  Procedure: The right neck was prepped and draped in sterile fashion  All elements of maximal sterile barrier technique were followed (cap, mask, sterile gown, sterile gloves, large sterile sheet, hand hygiene, and 2% chlorhexidine for cutaneous antisepsis)  Sterile ultrasound technique with sterile gel and sterile probe cover was also utilized  1% lidocaine was used for local anesthetic  The neck was surveyed with ultrasound to assess for vessel patency   The right internal jugular vein was punctured using a low lateral approach and direct ultrasound guidance with a 21-gauge needle  A static sonographic image was saved demonstrating needle entry  A guidewire was advanced into the inferior vena cava through an exchange dilator  The tract was  dilated and a 20 cm 14 Urdu dual-lumen catheter was advanced  The catheter was secured with suture and a sterile dressing was applied  Fluoroscopic guidance was utilized for catheter placement  Fluoroscopy time: 1 1 minutes Images: 1 Findings: The right internal jugular vein is patent by limited ultrasound  The tip of the catheter terminates at the caval atrial junction  Both ports flush and aspirate blood easily  Impression: Impression: Image guided placement of nontunneled central venous hemodialysis catheter  Workstation performed: [id number]       Labs:   Lab Results   Component Value Date    WBC 7 81 10/04/2022    HGB 10 1 (L) 10/04/2022    HCT 33 9 (L) 10/04/2022    MCV 88 10/04/2022     (H) 10/04/2022     Lab Results   Component Value Date     01/15/2018    K 3 2 (L) 10/04/2022     10/04/2022    CO2 28 10/04/2022    ANIONGAP 11 12/22/2015    BUN 9 10/04/2022    CREATININE 4 75 (H) 10/04/2022    GLUCOSE 64 (L) 08/01/2022    GLUF 185 (H) 02/24/2022    CALCIUM 8 9 10/04/2022    CORRECTEDCA 10 3 (H) 10/03/2022    AST 41 10/03/2022    ALT 19 10/03/2022    ALKPHOS 207 (H) 10/03/2022    PROT 7 1 01/15/2018    BILITOT 0 6 01/15/2018    EGFR 11 10/04/2022         Lab Results   Component Value Date    SPEP See Comment 09/16/2022    UPEP  08/04/2019     The UPEP shows non-selective proteinuria  The UPEP shows a monoclonal gammopathy  Immunofixation to be performed   Reviewed by: Lobo Roth MD **Electronic Signature**         Lab Results   Component Value Date    IRON 17 (L) 09/23/2022    TIBC 174 (L) 09/23/2022    FERRITIN 479 (H) 09/23/2022         ROS: As stated in the history of present illness otherwise his 14 point review of systems today was negative  Active Problems:   Patient Active Problem List   Diagnosis   • Chronic venous hypertension, right   • Diarrhea   • Chronic atrial fibrillation (HCC)   • Morbid obesity (HCC)   • Chronic combined systolic and diastolic congestive heart failure (HCC)   • Diabetes, polyneuropathy (HCC)   • GERD (gastroesophageal reflux disease)   • Hyperlipidemia   • Onychomycosis   • Gallstones   • Peripheral vascular disease (HCC)   • NALLELY (obstructive sleep apnea)   • Moderate persistent asthma without complication   • Diabetes mellitus, type 2 (HCC)   • DORA (acute kidney injury) ([place] [address] )   • Multiple myeloma (HCC)   • 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)   • Hiatal hernia   • Chronic obstructive pulmonary disease, unspecified (HCC)   • Hypertensive chronic kidney disease w stg 1-4/unsp chr kdny   • Mass of psoas muscle   • Gout   • Venous insufficiency (chronic) (peripheral)   • Need for influenza vaccination   • Ambulatory dysfunction   • Anemia   • Supratherapeutic INR   • Stage 3b chronic kidney disease ([place] [address] )   • Acute diverticulitis   • Abnormal CT scan   • Hypokalemia due to excessive gastrointestinal loss of potassium   • Acute cholecystitis       Past Medical History:   Past Medical History:   Diagnosis Date   • Cancer ([place] [address] )    • CHF (congestive heart failure) ([facility])    • Chronic kidney disease    • COPD (chronic obstructive pulmonary disease) ([place] [address] )    • COVID-19    • Decubitus ulcer of heel     LAST ASSESSED [id number]   • Diabetes mellitus ([place] [address] )    • History of varicose veins    • Hypertension    • Intermittent claudication ([facility])    • Neuropathy    • Seasonal allergies        Surgical History:   Past Surgical History:   Procedure Laterality Date   • AMPUTATION ABOVE KNEE (AKA) Left 7/31/2019    Procedure: AMPUTATION ABOVE KNEE (AKA);   Surgeon: Andi Lundborg, MD;  Location:  MAIN OR;  Service: General   • ANGIOPLASTY      W/ FLUOROSC ANGIOGRAPGY PERIPHERAL ARTERY ADDITIONAL  LAST ASSESSED [id number]   • ANGIOPLASTY / STENTING FEMORAL      TANSCATH INTRAVASCULAR STENT PLACEMENT PERCUTANEOUS FEMORAL    • COLONOSCOPY  2010   • CT BONE MARROW BIOPSY AND ASPIRATION  8/9/2019   • FULL THICKNESS SKIN GRAFT     • IR BIOPSY BONE MARROW  9/8/2022   • IR CHOLECYSTOSTOMY TUBE PLACEMENT  9/29/2022   • IR TEMPORARY DIALYSIS CATHETER PLACEMENT  9/15/2022   • IR TUNNELED DIALYSIS CATHETER PLACEMENT  9/22/2022   • TENDON REPAIR     • TOE AMPUTATION Left 12/27/2018    Procedure: AMPUTATION left 4th TOE;  Surgeon: Mya Acosta DPM;  Location: [facility] OR;  Service: Podiatry       Family History:    Family History   Problem Relation Age of Onset   • Other Mother         CARDIAC DISORDER    • Diabetes Mother    • Cancer Father    • Other Family         CARDIAC DISORDER    • Diabetes Family    • Cancer Family    • Mental illness Family    • Kidney disease Sister    • Diabetes Sister        Cancer-related family history includes Cancer in his family and father      Social History:   Social History     Socioeconomic History   • Marital status:      Spouse name: Not on file   • Number of children: 1   • Years of education: Not on file   • Highest education level: Not on file   Occupational History   • Occupation: RETIRED   Tobacco Use   • Smoking status: Never Smoker   • Smokeless tobacco: Never Used   Vaping Use   • Vaping Use: Never used   Substance and Sexual Activity   • Alcohol use: Not Currently   • Drug use: Not Currently   • Sexual activity: Not Currently   Other Topics Concern   • Not on file   Social History Narrative    DENIED [address]    FEELS SAFE AT HOME    LIVES WITH FAMILY - SISTER    NO LIVING WILL    POA IN EXISTENCE     SUPPORTIVE AND SAFE    One son, 2 granddaughters     Social Determinants of Health     Financial Resource Strain: Not on file   Food Insecurity: No Food Insecurity   • Worried About Running Out of Food in the Last Year: Never true   • Ran Out of Food in the Last Year: Never true   Transportation Needs: No Transportation Needs   • Lack of Transportation (Medical): No   • Lack of Transportation (Non-Medical):  No   Physical Activity: Not on file   Stress: Not on file   Social Connections: Not on file   Intimate Partner Violence: Not on file   Housing Stability: Low Risk    • Unable to Pay for Housing in the Last Year: No   • Number of Places Lived in the Last Year: 1   • Unstable Housing in the Last Year: No       Current Medications:   Current Outpatient Medications   Medication Sig Dispense Refill   • acetaminophen (TYLENOL) 500 mg tablet Take 1 tablet (500 mg total) by mouth every 6 (six) hours as needed for mild pain, headaches or fever 90 tablet 1   • albuterol (PROVENTIL HFA,VENTOLIN HFA) 90 mcg/act inhaler Inhale 2 puffs every 6 (six) hours as needed for wheezing 1 Inhaler 0   • Alcohol Swabs (ALCOHOL PREP) 70 % PADS   0   • allopurinol (ZYLOPRIM) 300 mg tablet TAKE ONE TABLET BY MOUTH DAILY (Patient taking differently: 300 mg) 30 tablet 5   • ammonium lactate (LAC-HYDRIN) 12 % lotion APPLY TO BLE TOPICALLY DAILY 400 g 2   • atorvastatin (LIPITOR) 40 mg tablet Take 1 tablet (40 mg total) by mouth daily after dinner 30 tablet 0   • b complex-vitamin C-folic acid (NEPHROCAPS) 1 mg capsule Take 1 capsule by mouth daily with dinner 30 capsule 0   • BD AUTOSHIELD DUO 30G X 5 MM MISC USE AS DIRECTED WITH INSULIN FOUR TIMES A  each 3   • BD INSULIN SYRINGE U/F 31G X 5/16" 0 5 ML MISC USE AS DIRECTED WITH NOVOLIN 70/30  0   • BD PEN NEEDLE HILARIO U/F 32G X 4 MM MISC by Other route 3 (three) times a day 300 each 1   • cholestyramine sugar free (QUESTRAN LIGHT) 4 g packet Take 1 packet (4 g total) by mouth 2 (two) times a day 30 packet 0   • clotrimazole (LOTRIMIN) 1 % cream APPLY TO BUTTOCK 2 TIMES DAILY 45 g 3   • dicyclomine (BENTYL) 10 mg capsule Take 1 capsule (10 mg total) by mouth 4 (four) times a day (before meals and at bedtime) 120 capsule 0   • diphenoxylate-atropine (LOMOTIL) 2 5-0 025 mg per tablet Take 1 tablet by mouth every 12 (twelve) hours for 10 days 20 tablet 0   • Easy Touch Safety Lancets 28G MISC USE 4 TIMES DAILY TO TEST BLOOD SUGAR 100 each 5   • fluticasone (FLONASE) 50 mcg/act nasal spray      • fluticasone-salmeterol (ADVAIR DISKUS, WIXELA INHUB) 250-50 mcg/dose inhaler Inhale 1 puff 2 (two) times a day Rinse mouth after use  1 Inhaler 5   • insulin lispro (HumaLOG) 100 units/mL injection Inject 1-6 Units under the skin 3 (three) times a day with meals 10 mL 0   • liraglutide (Victoza) injection Inject 1 8 mg under the skin daily     • melatonin 3 mg Take 2 tablets (6 mg total) by mouth daily at bedtime 30 tablet 0   • midodrine (PROAMATINE) 10 MG tablet Take 1 tablet (10 mg total) by mouth 3 (three) times a day before meals 30 tablet 0   • midodrine (PROAMATINE) 5 mg tablet Take 1 tablet (5 mg total) by mouth daily as needed (inter dialytic hypotension SBP <100 mmHg) 30 tablet 0   • multivitamin-minerals therapeutic (THERA-M)      • omeprazole (PriLOSEC) 40 MG capsule Take 1 capsule (40 mg total) by mouth daily Half an hour prior to breakfast 30 capsule 2   • ondansetron (ZOFRAN) 4 mg tablet ondansetron HCl 4 mg tablet   TAKE 1 TABLET BY MOUTH EVERY 8 HOURS AS NEEDED     • sodium chloride, PF, 0 9 % 10 mL by Intracatheter route daily Intracatheter flushing daily 300 mL 3   • warfarin (COUMADIN) 5 mg tablet Take 1 tablet (5 mg total) by mouth daily 30 tablet 0   • vancomycin (Vancocin) 125 MG capsule Take 1 capsule (125 mg total) by mouth 2 (two) times a day for 9 days (Patient not taking: No sig reported) 18 capsule 0     No current facility-administered medications for this visit  Allergies: Allergies   Allergen Reactions   • Latex Rash       Physical Exam:    There is no height or weight on file to calculate BSA      Wt Readings from Last 3 Encounters:   10/04/22 115 kg (252 lb 10 4 oz)   09/06/22 117 kg (257 lb 15 oz)   08/30/22 118 kg (260 lb)        Temp Readings from Last 3 Encounters:   10/13/22 97 8 °F (36 6 °C) (Temporal)   10/04/22 98 4 °F (36 9 °C)   09/08/22 98 °F (36 7 °C)        BP Readings from Last 3 Encounters:   10/13/22 120/80   10/04/22 102/53   09/08/22 (!) 86/58         Pulse Readings from Last 3 Encounters:   10/13/22 85   10/04/22 72   09/08/22 (!) 54         Physical Exam     Constitutional   General appearance: No acute distress,    Eyes   Conjunctiva and lids: No swelling, erythema or discharge  Pupils and irises: Equal, round and reactive to light  Ears, Nose, Mouth, and Throat   External inspection of ears and nose: Normal     Nasal mucosa, septum, and turbinates: Normal without edema or erythema  Oropharynx: Normal with no erythema, edema, exudate or lesions  Pulmonary   Respiratory effort: No increased work of breathing or signs of respiratory distress  Auscultation of lungs: Clear to auscultation  Cardiovascular   Palpation of heart: Normal PMI, no thrills  Auscultation of heart: Normal rate and rhythm, normal S1 and S2, without murmurs  Examination of extremities for edema and/or varicosities: Normal     Carotid pulses: Normal     Abdomen   Abdomen: Non-tender, no masses  Liver and spleen: No hepatomegaly or splenomegaly  Lymphatic   Palpation of lymph nodes in neck: No lymphadenopathy  Musculoskeletal   Gait and station:   In a stretcher   Digits and nails: Normal without clubbing or cyanosis  Inspection/palpation of joints, bones, and muscles:  Left lower extremity amputation  Skin   Skin and subcutaneous tissue: Normal without rashes or lesions  Assessment / Plan:      The patient is a very pleasant 71 yo male with multiple comorbidities including diabetes and hypertension with bone marrow biopsy proven multiple myeloma   He was referred to us in 8/2019 after 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  He was noted to have elevated kidney function and anemia which prompted work up for multiple myeloma  His skeletal survey was negative  He underwent bone marrow biopsy confirming multiple myeloma  He was initiated on treatment with with Velcade and Decadron weekly on a 35 day schedule  Velcade is 1 3 milligrams/meter squared and Decadron is 20 mg p o  On days 1, 8, 15, 22      Skeletal survey from 11/2020 showed no evidence of lytic bony lesions  We had a bone marrow biopsy done as the patient's kidney function has deteriorated and the patient is now on dialysis  Bone marrow biopsy actually shows the patient is in remission at this point with no evidence of myeloma seen  At this point I will switch the patient to Velcade every 2 weeks on a maintenance schedule as he is also getting hemodialysis 3 times a week  The patient agrees with this  He will get Velcade every 2 weeks  He will get blood work every 2 weeks  He will get myeloma blood work in 2 months  He will see me back in 2 months  Goals and Barriers:  Current Goal:  Prolong Survival from myeloma  Barriers: None  Patient's Capacity to Self Care:  Patient able to self care  Portions of the record may have been created with voice recognition software  Occasional wrong word or "sound a like" substitutions may have occurred due to the inherent limitations of voice recognition software  Read the chart carefully and recognize, using context, where substitutions have occurred

## 2022-10-14 DIAGNOSIS — C90.00 MULTIPLE MYELOMA NOT HAVING ACHIEVED REMISSION (HCC): Primary | ICD-10-CM

## 2022-10-14 RX ORDER — DEXAMETHASONE 4 MG/1
20 TABLET ORAL ONCE
Status: CANCELLED
Start: 2022-11-01 | End: 2022-11-01

## 2022-10-14 RX ORDER — DEXAMETHASONE 4 MG/1
20 TABLET ORAL ONCE
Status: CANCELLED
Start: 2022-10-18 | End: 2022-10-18

## 2022-10-18 ENCOUNTER — HOSPITAL ENCOUNTER (OUTPATIENT)
Dept: INFUSION CENTER | Facility: HOSPITAL | Age: 72
End: 2022-10-18
Attending: INTERNAL MEDICINE

## 2022-10-18 NOTE — ED PROVIDER NOTES
History  Chief Complaint   Patient presents with   • Shortness of Breath     Pt from [facility] with c/o SOB x3 days  Sating 95-98% on RA  Had dialysis today        70-year-old male presents for evaluation of shortness of breath that started a few days ago  Patient states she was at dialysis this morning in completed his session and his shortness of breath became worse  Patient is able to speak in complete sentences  Oxygen saturation noted to be 95% on room air  Patient denies chest pain, nausea, vomiting, cough, fever  Symptoms are exacerbated with mild exertion  Prior to Admission Medications   Prescriptions Last Dose Informant Patient Reported? Taking?    Alcohol Swabs (ALCOHOL PREP) 70 % PADS  Outside Facility (Specify) Yes No   BD AUTOSHIELD DUO 30G X 5 MM MISC  Outside Facility (Specify) No No   Sig: USE AS DIRECTED WITH INSULIN FOUR TIMES A DAY   BD INSULIN SYRINGE U/F 31G X 5/16" 0 5 ML MISC  Outside Facility (Specify) Yes No   Sig: USE AS DIRECTED WITH NOVOLIN 70/30   BD PEN NEEDLE HILARIO U/F 32G X 4 MM MISC  Outside Facility (Specify) No No   Sig: by Other route 3 (three) times a day   Easy Touch Safety Lancets 28G MISC  Outside Facility (Specify) No No   Sig: USE 4 TIMES DAILY TO TEST BLOOD SUGAR   acetaminophen (TYLENOL) 500 mg tablet  Outside Facility (Specify) No No   Sig: Take 1 tablet (500 mg total) by mouth every 6 (six) hours as needed for mild pain, headaches or fever   albuterol (PROVENTIL HFA,VENTOLIN HFA) 90 mcg/act inhaler  Outside Facility (Specify) No No   Sig: Inhale 2 puffs every 6 (six) hours as needed for wheezing   allopurinol (ZYLOPRIM) 300 mg tablet  Outside Facility (Specify) No No   Sig: TAKE ONE TABLET BY MOUTH DAILY   Patient taking differently: 300 mg   ammonium lactate (LAC-HYDRIN) 12 % lotion  Outside Facility (Specify) No No   Sig: APPLY TO BLE TOPICALLY DAILY   atorvastatin (LIPITOR) 40 mg tablet  Outside Facility (Specify) No No   Sig: Take 1 tablet (40 mg total) by mouth daily after dinner   b complex-vitamin C-folic acid (NEPHROCAPS) 1 mg capsule  Outside Facility (Specify) No No   Sig: Take 1 capsule by mouth daily with dinner   cholestyramine sugar free (QUESTRAN LIGHT) 4 g packet  Outside Facility (Specify) No No   Sig: Take 1 packet (4 g total) by mouth 2 (two) times a day   clotrimazole (LOTRIMIN) 1 % cream  Outside Facility (Specify) No No   Sig: APPLY TO BUTTOCK 2 TIMES DAILY   dicyclomine (BENTYL) 10 mg capsule  Outside Facility (Specify) No No   Sig: Take 1 capsule (10 mg total) by mouth 4 (four) times a day (before meals and at bedtime)   fluticasone (FLONASE) 50 mcg/act nasal spray  Outside Facility (Specify) Yes No   fluticasone-salmeterol (ADVAIR DISKUS, WIXELA INHUB) 250-50 mcg/dose inhaler  Outside Facility (Specify) No No   Sig: Inhale 1 puff 2 (two) times a day Rinse mouth after use     insulin lispro (HumaLOG) 100 units/mL injection  Outside Facility (Specify) No No   Sig: Inject 1-6 Units under the skin 3 (three) times a day with meals   liraglutide (Victoza) injection  Outside Facility (Specify) Yes No   Sig: Inject 1 8 mg under the skin daily   melatonin 3 mg  Outside Facility (Specify) No No   Sig: Take 2 tablets (6 mg total) by mouth daily at bedtime   midodrine (PROAMATINE) 10 MG tablet  Outside Facility (Specify) No No   Sig: Take 1 tablet (10 mg total) by mouth 3 (three) times a day before meals   midodrine (PROAMATINE) 5 mg tablet  Outside Facility (Specify) No No   Sig: Take 1 tablet (5 mg total) by mouth daily as needed (inter dialytic hypotension SBP <100 mmHg)   multivitamin-minerals therapeutic (THERA-M)  Outside Facility (Specify) Yes No   omeprazole (PriLOSEC) 40 MG capsule  Outside Facility (Specify) No No   Sig: Take 1 capsule (40 mg total) by mouth daily Half an hour prior to breakfast   ondansetron (ZOFRAN) 4 mg tablet  Outside Facility (Specify) Yes No   Sig: ondansetron HCl 4 mg tablet   TAKE 1 TABLET BY MOUTH EVERY 8 HOURS AS NEEDED   sodium chloride, PF, 0 9 %  Outside Facility (Specify) No No   Sig: 10 mL by Intracatheter route daily Intracatheter flushing daily   warfarin (COUMADIN) 5 mg tablet  Outside Facility (Specify) No No   Sig: Take 1 tablet (5 mg total) by mouth daily      Facility-Administered Medications: None       Past Medical History:   Diagnosis Date   • Cancer ([place] [address] )    • CHF (congestive heart failure) ([facility])    • Chronic kidney disease    • COPD (chronic obstructive pulmonary disease) ([place] [address] )    • COVID-19    • Decubitus ulcer of heel     LAST ASSESSED [id number]   • Diabetes mellitus ([place] [address] )    • History of varicose veins    • Hypertension    • Intermittent claudication ([facility])    • Neuropathy    • Seasonal allergies        Past Surgical History:   Procedure Laterality Date   • AMPUTATION ABOVE KNEE (AKA) Left 7/31/2019    Procedure: AMPUTATION ABOVE KNEE (AKA);   Surgeon: Hoda Moody MD;  Location:  MAIN OR;  Service: General   • ANGIOPLASTY      W/ FLUOROSC ANGIOGRAPGY PERIPHERAL ARTERY ADDITIONAL  LAST ASSESSED [id number]   • ANGIOPLASTY / STENTING FEMORAL      TANSCATH INTRAVASCULAR STENT PLACEMENT PERCUTANEOUS FEMORAL    • COLONOSCOPY  2010   • CT BONE MARROW BIOPSY AND ASPIRATION  8/9/2019   • FULL THICKNESS SKIN GRAFT     • IR BIOPSY BONE MARROW  9/8/2022   • IR CHOLECYSTOSTOMY TUBE PLACEMENT  9/29/2022   • IR TEMPORARY DIALYSIS CATHETER PLACEMENT  9/15/2022   • IR TUNNELED DIALYSIS CATHETER PLACEMENT  9/22/2022   • TENDON REPAIR     • TOE AMPUTATION Left 12/27/2018    Procedure: AMPUTATION left 4th TOE;  Surgeon: Isabel Naylor DPM;  Location:  MAIN OR;  Service: Podiatry       Family History   Problem Relation Age of Onset   • Other Mother         CARDIAC DISORDER    • Diabetes Mother    • Cancer Father    • Other Family         CARDIAC DISORDER    • Diabetes Family    • Cancer Family    • Mental illness Family    • Kidney disease Sister    • Diabetes Sister      I have reviewed and agree with the history as documented  E-Cigarette/Vaping   • E-Cigarette Use Never User      E-Cigarette/Vaping Substances   • Nicotine No    • THC No    • CBD No    • Flavoring No    • Other No    • Unknown No      Social History     Tobacco Use   • Smoking status: Never Smoker   • Smokeless tobacco: Never Used   Vaping Use   • Vaping Use: Never used   Substance Use Topics   • Alcohol use: Not Currently   • Drug use: Not Currently       Review of Systems   Constitutional: Negative for fever  Respiratory: Positive for cough and shortness of breath  Cardiovascular: Negative for chest pain  All other systems reviewed and are negative  Physical Exam  Physical Exam  Vitals and nursing note reviewed  Constitutional:       General: He is not in acute distress  Appearance: He is well-developed  HENT:      Head: Normocephalic and atraumatic  Right Ear: External ear normal       Left Ear: External ear normal       Nose: Nose normal    Eyes:      General: No scleral icterus  Cardiovascular:      Rate and Rhythm: Tachycardia present  Pulmonary:      Effort: No respiratory distress  Comments: diminished breath sounds but limited due to body habitus  Mildly tachypneic but able to complete full sentences  Abdominal:      General: There is no distension  Palpations: Abdomen is soft  Musculoskeletal:         General: No deformity  Cervical back: Normal range of motion and neck supple  Comments: Left knee AKA   Skin:     General: Skin is warm  Findings: No rash  Neurological:      General: No focal deficit present  Mental Status: He is alert        Gait: Gait normal    Psychiatric:         Mood and Affect: Mood normal          Vital Signs  ED Triage Vitals [10/17/22 1815]   Temperature Pulse Respirations Blood Pressure SpO2   98 9 °F (37 2 °C) (!) 132 (!) 37 133/86 96 %      Temp Source Heart Rate Source Patient Position - Orthostatic VS BP Location FiO2 (%)   Oral Monitor Lying Right arm --      Pain Score       No Pain           Vitals:    10/17/22 1815 10/17/22 1902   BP: 133/86 123/79   Pulse: (!) 132 (!) 136   Patient Position - Orthostatic VS: Lying Lying         Visual Acuity      ED Medications  Medications   diltiazem (CARDIZEM) injection 15 mg (has no administration in time range)   norepinephrine (LEVOPHED) 4 mg (STANDARD CONCENTRATION) IV in sodium chloride 0 9% 250 mL (has no administration in time range)   fentaNYL 1000 mcg in sodium chloride 0 9% 100mL infusion (has no administration in time range)   EPINEPHrine (ADRENALIN) injection (1 mg Intravenous Given 10/17/22 1949)   calcium chloride 1 g/10 mL injection (1 g Intravenous Given 10/17/22 1949)   sodium bicarbonate 50 mEq/50 mL injection (100 mEq Intravenous Given 10/17/22 1951)   sodium bicarbonate 8 4 % injection ( Intravenous Canceled Entry 10/17/22 1949)       Diagnostic Studies  Results Reviewed     Procedure Component Value Units Date/Time    Procalcitonin [[id number]]  (Abnormal) Collected: 10/17/22 1839    Lab Status: Final result Specimen: Blood from Arm, Right Updated: 10/17/22 2119     Procalcitonin 529 18 ng/ml     Protime-INR [[id number]]  (Abnormal) Collected: 10/17/22 1839    Lab Status: Final result Specimen: Blood from Arm, Right Updated: 10/17/22 2012     Protime 58 3 seconds      INR 6 36    APTT [[id number]]  (Abnormal) Collected: 10/17/22 1839    Lab Status: Final result Specimen: Blood from Arm, Right Updated: 10/17/22 2012     PTT 74 seconds     HS Troponin I 4hr [[id number]]     Lab Status: No result Specimen: Blood     POCT Blood Gas (CG8+) [[id number]]  (Abnormal) Collected: 10/17/22 1948    Lab Status: Final result Specimen: Venous Updated: 10/17/22 1956     ph, Volodymyr ISTAT 7 159     pCO2, Volodymyr i-STAT 49 5 mm HG      pO2, Volodymyr i-STAT 20 0 mm HG      BE, i-STAT -10 mmol/L      HCO3, Volodymyr i-STAT 17 6 mmol/L      CO2, i-STAT 19 mmol/L      O2 Sat, i-STAT 22 %      SODIUM, I-STAT 139 mmol/l      Potassium, i-STAT 3 5 mmol/L      Calcium, Ionized i-STAT 1 15 mmol/L      Hct, i-STAT 24 %      Hgb, i-STAT 8 2 g/dl      Glucose, i-STAT 166 mg/dl      Specimen Type VENOUS    CBC and differential [[id number]]  (Abnormal) Collected: 10/17/22 1839    Lab Status: Final result Specimen: Blood from Arm, Right Updated: 10/17/22 1948     WBC 11 88 Thousand/uL      RBC 4 03 Million/uL      Hemoglobin 10 3 g/dL      Hematocrit 34 6 %      MCV 86 fL      MCH 25 6 pg      MCHC 29 8 g/dL      RDW 18 7 %      MPV 10 5 fL      Platelets 118 Thousands/uL     Narrative: This is an appended report  These results have been appended to a previously verified report      Manual Differential(PHLEBS Do Not Order) [[id number]]  (Abnormal) Collected: 10/17/22 1839    Lab Status: Final result Specimen: Blood from Arm, Right Updated: 10/17/22 1948     Segmented % 76 %      Bands % 5 %      Lymphocytes % 11 %      Monocytes % 4 %      Eosinophils, % 2 %      Basophils % 0 %      Metamyelocytes% 2 %      Absolute Neutrophils 9 62 Thousand/uL      Lymphocytes Absolute 1 31 Thousand/uL      Monocytes Absolute 0 48 Thousand/uL      Eosinophils Absolute 0 24 Thousand/uL      Basophils Absolute 0 00 Thousand/uL      Total Counted --     nRBC 1 /100 WBC      RBC Morphology Present     Anisocytosis Present     Hypochromia Present     Platelet Estimate Adequate    Fingerstick Glucose (POCT) [[id number]]  (Abnormal) Collected: 10/17/22 1945    Lab Status: Final result Updated: 10/17/22 1946     POC Glucose 181 mg/dl     NT-BNP PRO [[id number]]  (Abnormal) Collected: 10/17/22 1839    Lab Status: Final result Specimen: Blood from Arm, Right Updated: 10/17/22 1944     NT-proBNP 61,288 pg/mL     Comprehensive metabolic panel [[id number]]  (Abnormal) Collected: 10/17/22 1839    Lab Status: Final result Specimen: Blood from Arm, Right Updated: 10/17/22 1924     Sodium 134 mmol/L      Potassium 4 3 mmol/L      Chloride 90 mmol/L      CO2 26 mmol/L      ANION GAP 18 mmol/L      BUN 25 mg/dL      Creatinine 3 79 mg/dL      Glucose 106 mg/dL      Calcium 9 0 mg/dL      Corrected Calcium 10 5 mg/dL       U/L      ALT 35 U/L      Alkaline Phosphatase 215 U/L      Total Protein 6 9 g/dL      Albumin 2 1 g/dL      Total Bilirubin 1 90 mg/dL      eGFR 15 ml/min/1 73sq m     Narrative:      [place] guidelines for Chronic Kidney Disease (CKD):   •  Stage 1 with normal or high GFR (GFR > 90 mL/min/1 73 square meters)  •  Stage 2 Mild CKD (GFR = 60-89 mL/min/1 73 square meters)  •  Stage 3A Moderate CKD (GFR = 45-59 mL/min/1 73 square meters)  •  Stage 3B Moderate CKD (GFR = 30-44 mL/min/1 73 square meters)  •  Stage 4 Severe CKD (GFR = 15-29 mL/min/1 73 square meters)  •  Stage 5 End Stage CKD (GFR <15 mL/min/1 73 square meters)  Note: GFR calculation is accurate only with a steady state creatinine    COVID19, Influenza A/B, RSV PCR, [facility] [[id number]]  (Normal) Collected: 10/17/22 1839    Lab Status: Final result Specimen: Nares from Nose Updated: 10/17/22 1924     SARS-CoV-2 Negative     INFLUENZA A PCR Negative     INFLUENZA B PCR Negative     RSV PCR Negative    Narrative:      FOR PEDIATRIC PATIENTS - copy/paste COVID Guidelines URL to browser: [email or web address]    SARS-CoV-2 assay is a Nucleic Acid Amplification assay intended for the  qualitative detection of nucleic acid from SARS-CoV-2 in nasopharyngeal  swabs  Results are for the presumptive identification of SARS-CoV-2 RNA  Positive results are indicative of infection with SARS-CoV-2, the virus  causing COVID-19, but do not rule out bacterial infection or co-infection  with other viruses  Laboratories within the [place] and its  territories are required to report all positive results to the appropriate  public health authorities   Negative results do not preclude SARS-CoV-2  infection and should not be used as the sole basis for treatment or other  patient management decisions  Negative results must be combined with  clinical observations, patient history, and epidemiological information  This test has not been FDA cleared or approved  This test has been authorized by FDA under an Emergency Use Authorization  (EUA)  This test is only authorized for the duration of time the  declaration that circumstances exist justifying the authorization of the  emergency use of an in vitro diagnostic tests for detection of SARS-CoV-2  virus and/or diagnosis of COVID-19 infection under section 564(b)(1) of  the Act, 21 U  [place]  541AQS-4(A)(3), unless the authorization is terminated  or revoked sooner  The test has been validated but independent review by FDA  and CLIA is pending  Test performed using [employer]: This RT-PCR assay targets N2,  a region unique to SARS-CoV-2  A conserved region in the E-gene was chosen  for pan-Sarbecovirus detection which includes SARS-CoV-2  According to CMS-2020-01-R, this platform meets the definition of high-throughput technology  Lactic Acid [[id number]]  (Abnormal) Collected: 10/17/22 1839    Lab Status: Final result Specimen: Blood from Arm, Right Updated: 10/17/22 1914     LACTIC ACID 6 8 mmol/L     Narrative:      Result may be elevated if tourniquet was used during collection      Lactic acid 2 Hours [[id number]]     Lab Status: No result Specimen: Blood     HS Troponin 0hr (reflex protocol) [[id number]]  (Abnormal) Collected: 10/17/22 1839    Lab Status: Final result Specimen: Blood from Arm, Right Updated: 10/17/22 1909     hs TnI 0hr 452 ng/L     HS Troponin I 2hr [[id number]]     Lab Status: No result Specimen: Blood     POCT Blood Gas (CG8+) [[id number]]  (Abnormal) Collected: 10/17/22 1841    Lab Status: Final result Specimen: Venous Updated: 10/17/22 1845     ph, Volodymyr ISTAT 7 397     pCO2, Volodymyr i-STAT 41 8 mm HG      pO2, Volodymyr i-STAT 14 0 mm HG      BE, i-STAT 1 mmol/L      HCO3, Volodymyr i-STAT 25 7 mmol/L      CO2, i-STAT 27 mmol/L      O2 Sat, i-STAT 16 %      SODIUM, I-STAT 134 mmol/l      Potassium, i-STAT 4 3 mmol/L      Calcium, Ionized i-STAT 1 03 mmol/L      Hct, i-STAT 34 %      Hgb, i-STAT 11 6 g/dl      Glucose, i-STAT 99 mg/dl      Specimen Type VENOUS    Blood culture #2 [[id number]] Collected: 10/17/22 1839    Lab Status: In process Specimen: Blood from Arm, Right Updated: 10/17/22 1844    Blood culture #1 [[id number]] Collected: 10/17/22 1839    Lab Status: In process Specimen: Blood from Arm, Right Updated: 10/17/22 1844    UA w Reflex to Microscopic w Reflex to Culture [[id number]]     Lab Status: No result Specimen: Urine                  XR chest 1 view portable    (Results Pending)              Procedures  CriticalCare Time  Performed by: Franchesca Alfaro DO  Authorized by:  Franchesca Alfaro DO     Critical care provider statement:     Critical care time (minutes):  54    Critical care time was exclusive of:  Separately billable procedures and treating other patients and teaching time    Critical care was necessary to treat or prevent imminent or life-threatening deterioration of the following conditions:  Cardiac failure, circulatory failure, renal failure, sepsis and dehydration    Critical care was time spent personally by me on the following activities:  Blood draw for specimens, obtaining history from patient or surrogate, development of treatment plan with patient or surrogate, discussions with primary provider, examination of patient, ordering and performing treatments and interventions, re-evaluation of patient's condition, ordering and review of radiographic studies, ordering and review of laboratory studies, review of old charts and evaluation of patient's response to treatment             ED Course                                             MDM  Number of Diagnoses or Management Options  Heart failure [facility]): new and requires workup  Diagnosis management comments:   53-year-old male presenting with worsening dyspnea  Obtain cardiac/sepsis evaluation  Patient's oxygen saturation maintaining on room air at this time  No further intervention required  EKG non specific changes but not ischemic  Troponin returned at 452 likely secondary to demand ischemia due to mildly rapid atrial fibrillation  Cardizem ordered to reduce cardiac demand  Cardizem was not administered  Monitor revealed patient to become bradycardic  Upon evaluating patient, he was found to be pulseless and CPR was initiated immediately and ACLS protocol was started  Multiple rounds of ACLS, patient was intubated and ROSC was achieved as vitals stabilized  Patient was started on a levo drop and maintained HR/BP for a few minutes then went into cardiac arrest again  ACLS again initiated after patient found to be in fine vfib  Multiple shocks delivered  Patient was not on any sedation and had no response to painful stimuli  Pupils were fixed and dilated  No cardiac activity on bedside ultrasound and no pulse confirmed with palpation and also carotid US  No spontaneous respirations  After multiple rounds of ACLS, further attempts were futile given patient's comorbidities and current clinical condition  Patient  at 5  Patient's son was updated via telephone          Amount and/or Complexity of Data Reviewed  Clinical lab tests: ordered and reviewed  Tests in the radiology section of CPT®: reviewed and ordered  Tests in the medicine section of CPT®: reviewed and ordered  Decide to obtain previous medical records or to obtain history from someone other than the patient: yes  Review and summarize past medical records: yes        Disposition  Final diagnoses:   Heart failure ([address] )     Time reflects when diagnosis was documented in both MDM as applicable and the Disposition within this note     Time User Action Codes Description Comment    10/17/2022  8:00 PM Masood Hernandes Add [I50 9] Heart failure [facility])       ED Disposition     ED Disposition       Condition   --    Date/Time   Mon Oct 17, 2022  8:00 PM    Comment   --         Follow-up Information    None       Date, Time and Cause of Death    Date of Death: 10/17/22  Time of Death:  7:53 PM  Preliminary Cause of Death: Heart failure [facility])       Discharge Medication List as of 10/17/2022 10:10 PM      CONTINUE these medications which have NOT CHANGED    Details   acetaminophen (TYLENOL) 500 mg tablet Take 1 tablet (500 mg total) by mouth every 6 (six) hours as needed for mild pain, headaches or fever, Starting Mon 10/21/2019, Print      albuterol (PROVENTIL HFA,VENTOLIN HFA) 90 mcg/act inhaler Inhale 2 puffs every 6 (six) hours as needed for wheezing, Starting 2019, Normal      Alcohol Swabs (ALCOHOL PREP) 70 % PADS Starting 2019, Historical Med      allopurinol (ZYLOPRIM) 300 mg tablet TAKE ONE TABLET BY MOUTH DAILY, Normal      ammonium lactate (LAC-HYDRIN) 12 % lotion APPLY TO BLE TOPICALLY DAILY, Normal      atorvastatin (LIPITOR) 40 mg tablet Take 1 tablet (40 mg total) by mouth daily after dinner, Starting Wed 7/15/2020, No Print      b complex-vitamin C-folic acid (NEPHROCAPS) 1 mg capsule Take 1 capsule by mouth daily with dinner, Starting Tue 10/4/2022, No Print      !! BD AUTOSHIELD DUO 30G X 5 MM MISC USE AS DIRECTED WITH INSULIN FOUR TIMES A DAY, Normal      BD INSULIN SYRINGE U/F 31G X 5/16" 0 5 ML MISC USE AS DIRECTED WITH NOVOLIN 70/30, Historical Med      !! BD PEN NEEDLE HILARIO U/F 32G X 4 MM MISC by Other route 3 (three) times a day, Starting 2019, Normal      cholestyramine sugar free (QUESTRAN LIGHT) 4 g packet Take 1 packet (4 g total) by mouth 2 (two) times a day, Starting Wed 8/10/2022, No Print      clotrimazole (LOTRIMIN) 1 % cream APPLY TO BUTTOCK 2 TIMES DAILY, Normal      dicyclomine (BENTYL) 10 mg capsule Take 1 capsule (10 mg total) by mouth 4 (four) times a day (before meals and at bedtime), Starting Tue 10/4/2022, No Print      Easy Touch Safety Lancets 28G MISC USE 4 TIMES DAILY TO TEST BLOOD SUGAR, Normal      fluticasone (FLONASE) 50 mcg/act nasal spray Starting Mon 7/18/2022, Historical Med      fluticasone-salmeterol (ADVAIR DISKUS, WIXELA INHUB) 250-50 mcg/dose inhaler Inhale 1 puff 2 (two) times a day Rinse mouth after use , Starting Wed 7/24/2019, Normal      insulin lispro (HumaLOG) 100 units/mL injection Inject 1-6 Units under the skin 3 (three) times a day with meals, Starting Tue 10/4/2022, No Print      liraglutide (Victoza) injection Inject 1 8 mg under the skin daily, Historical Med      melatonin 3 mg Take 2 tablets (6 mg total) by mouth daily at bedtime, Starting Tue 10/4/2022, No Print      !! midodrine (PROAMATINE) 10 MG tablet Take 1 tablet (10 mg total) by mouth 3 (three) times a day before meals, Starting Tue 10/4/2022, No Print      !! midodrine (PROAMATINE) 5 mg tablet Take 1 tablet (5 mg total) by mouth daily as needed (inter dialytic hypotension SBP <100 mmHg), Starting Tue 10/4/2022, No Print      multivitamin-minerals therapeutic (THERA-M) Starting Sat 9/19/2020, Historical Med      omeprazole (PriLOSEC) 40 MG capsule Take 1 capsule (40 mg total) by mouth daily Half an hour prior to breakfast, Starting Wed 3/16/2022, Normal      ondansetron (ZOFRAN) 4 mg tablet ondansetron HCl 4 mg tablet   TAKE 1 TABLET BY MOUTH EVERY 8 HOURS AS NEEDED, Historical Med      sodium chloride, PF, 0 9 % 10 mL by Intracatheter route daily Intracatheter flushing daily, Starting Thu 9/29/2022, Until Fri 1/27/2023, Normal      warfarin (COUMADIN) 5 mg tablet Take 1 tablet (5 mg total) by mouth daily, Starting Tue 10/4/2022, No Print       !! - Potential duplicate medications found  Please discuss with provider  No discharge procedures on file      PDMP Review       Value Time User    PDMP Reviewed  Yes 11/5/2019  2:42 PM Bianka Weeks MD          ED Provider  Electronically Signed by Deb Esquivel DO  10/17/22 0635

## 2022-10-18 NOTE — ED PROCEDURE NOTE
PROCEDURE  Intubation    Date/Time: 10/17/2022 8:09 PM  Performed by: Sejal Tucker DO  Authorized by: Sejal Tucker DO     Patient location:  ED  Consent:     Consent obtained:  Emergent situation    Alternatives discussed:  No treatment  Universal protocol:     Patient identity confirmed:  Arm band  Pre-procedure details:     Patient status:  Unresponsive    Mallampati score:  4    Pretreatment medications:  None  Indications:     Indications for intubation: respiratory failure and hypoxemia    Procedure details:     Preoxygenation:  Nasal cannula    CPR in progress: yes      Intubation method:  Oral    Oral intubation technique:  Direct    Laryngoscope blade:  Talbot 3    Tube size (mm):  7 5    Tube type:  Cuffed    Number of attempts:  1    Cricoid pressure: no      Tube visualized through cords: yes    Placement assessment:     ETT to teeth:  24    Tube secured with:  ETT rivera    Breath sounds:  Equal    Placement verification: chest rise, condensation, ETCO2 detector and tube exhalation    Post-procedure details:     Patient tolerance of procedure:   Tolerated well, no immediate complications         Sejal Tucker DO  10/17/22 2013

## 2022-10-19 LAB
ATRIAL RATE: 36 BPM
ATRIAL RATE: 73 BPM
QRS AXIS: 70 DEGREES
QRS AXIS: 73 DEGREES
QRSD INTERVAL: 178 MS
QRSD INTERVAL: 184 MS
QT INTERVAL: 338 MS
QT INTERVAL: 386 MS
QTC INTERVAL: 451 MS
QTC INTERVAL: 542 MS
T WAVE AXIS: 110 DEGREES
T WAVE AXIS: 249 DEGREES
VENTRICULAR RATE: 107 BPM
VENTRICULAR RATE: 119 BPM

## 2022-10-21 LAB
BACTERIA BLD CULT: ABNORMAL
BACTERIA BLD CULT: ABNORMAL
GRAM STN SPEC: ABNORMAL
GRAM STN SPEC: ABNORMAL

## 2022-12-26 NOTE — PROGRESS NOTES
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  Initial imaging showed air in the biliary tree that LFTs have been okay  His abdominal pain is better but he continues to have diarrhea  C diff has been negative  Note patient also has multiple myeloma and is receiving chemotherapy  I think the diarrhea is multifactorial probably were related to the antibiotics that he is receiving for the diverticulitis as well as a chemo effect from his myeloma  Treatment at this point is supportive  Correct electrolytes symptomatic treatment he has received Questran  During his colonoscopy that [place] he did not get random biopsies for microscopic colitis but I think this is unlikely also not safe to perform right now given active diverticulitis  I wonder if the chemotherapy could be or should be held for a period of time  Chief Complaint   Patient presents with    Abnormal Lab     Patient arrives via EMS from [facility] for an elevated creatinine of 7 3  Reports yellow emesis for a few days  Patient has RUQ and RLQ abdominal pain, is currently getting chemo once a week for kidney cancer  SUBJECTIVE/HPI   Patient is presently having port placed in IR  I discussed his situation with his nurse Johanna, diarrhea persist via the rectal tube no complaints of pain patient eating      BP (!) 80/46   Pulse 61   Temp 98 6 °F (37 °C)   Resp 17   Ht 6' 3" (1 905 m)   Wt 111 kg (245 lb 2 4 oz)   SpO2 93%   BMI 30 64 kg/m²       Lab Results   Component Value Date    GLUCOSE 64 (L) 08/01/2022    CALCIUM 8 5 09/22/2022     01/15/2018    K 3 8 09/22/2022    CO2 25 09/22/2022     09/22/2022    BUN 14 09/22/2022    CREATININE 4 31 (H) 09/22/2022     Lab Results   Component Value Date    WBC 6 70 09/22/2022    HGB 8 9 (L) 09/22/2022    HCT 29 6 (L) 09/22/2022    MCV 88 09/22/2022     09/22/2022     Lab Results   Component Value Date    ALT 8 (L) 09/18/2022    AST 14 09/18/2022     (H) 11/03/2019    ALKPHOS 87 09/18/2022    BILITOT 0 6 01/15/2018     No results found for: AMYLASE  Lab Results   Component Value Date    LIPASE 691 (H) 09/13/2022     Lab Results   Component Value Date    IRON 17 (L) 08/04/2022    TIBC 206 (L) 08/04/2022    FERRITIN 165 08/04/2022     Lab Results   Component Value Date    INR 2 23 (H) 09/22/2022 [facility]

## 2023-04-03 NOTE — DISCHARGE INSTRUCTIONS
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  Heart sounds S1, S2.

## 2023-04-21 NOTE — ASSESSMENT & PLAN NOTE
----- Message from AMARILIS James sent at 4/21/2023 12:51 PM EDT -----  Chest x-ray shows no pneumonia  Vascular congestion  He has been euvolemic on cardiac exam as well as primary care exam  Please let Liu know his chest x-ray is clear of pneumonia, if he still not feeling well have them schedule another appointment  Karina   · Known ambulatory dysfunction  · For Discharge to [facility]

## 2023-05-10 NOTE — PLAN OF CARE
Problem: PAIN - ADULT  Goal: Verbalizes/displays adequate comfort level or baseline comfort level  Description  Interventions:  - Encourage patient to monitor pain and request assistance  - Assess pain using appropriate pain scale  - Administer analgesics based on type and severity of pain and evaluate response  - Implement non-pharmacological measures as appropriate and evaluate response  - Consider cultural and social influences on pain and pain management  - Notify physician/advanced practitioner if interventions unsuccessful or patient reports new pain  Outcome: Progressing     Problem: INFECTION - ADULT  Goal: Absence or prevention of progression during hospitalization  Description  INTERVENTIONS:  - Assess and monitor for signs and symptoms of infection  - Monitor lab/diagnostic results  - Monitor all insertion sites, i e  indwelling lines, tubes, and drains  - Monitor endotracheal if appropriate and nasal secretions for changes in amount and color  - [place] appropriate cooling/warming therapies per order  - Administer medications as ordered  - Instruct and encourage patient and family to use good hand hygiene technique  - Identify and instruct in appropriate isolation precautions for identified infection/condition  Outcome: Progressing  Goal: Absence of fever/infection during neutropenic period  Description  INTERVENTIONS:  - Monitor WBC    Outcome: Progressing     Problem: SAFETY ADULT  Goal: Patient will remain free of falls  Description  INTERVENTIONS:  - Assess patient frequently for physical needs  -  Identify cognitive and physical deficits and behaviors that affect risk of falls    -  [place] fall precautions as indicated by assessment   - Educate patient/family on patient safety including physical limitations  - Instruct patient to call for assistance with activity based on assessment  - Modify environment to reduce risk of injury  - Consider OT/PT consult to assist with strengthening/mobility  Outcome: Progressing  Goal: Maintain or return to baseline ADL function  Description  INTERVENTIONS:  -  Assess patient's ability to carry out ADLs; assess patient's baseline for ADL function and identify physical deficits which impact ability to perform ADLs (bathing, care of mouth/teeth, toileting, grooming, dressing, etc )  - Assess/evaluate cause of self-care deficits   - Assess range of motion  - Assess patient's mobility; develop plan if impaired  - Assess patient's need for assistive devices and provide as appropriate  - Encourage maximum independence but intervene and supervise when necessary  - Involve family in performance of ADLs  - Assess for home care needs following discharge   - Consider OT consult to assist with ADL evaluation and planning for discharge  - Provide patient education as appropriate  Outcome: Progressing  Goal: Maintain or return mobility status to optimal level  Description  INTERVENTIONS:  - Assess patient's baseline mobility status (ambulation, transfers, stairs, etc )    - Identify cognitive and physical deficits and behaviors that affect mobility  - Identify mobility aids required to assist with transfers and/or ambulation (gait belt, sit-to-stand, lift, walker, cane, etc )  - [place] fall precautions as indicated by assessment  - Record patient progress and toleration of activity level on Mobility SBAR; progress patient to next Phase/Stage  - Instruct patient to call for assistance with activity based on assessment  - Consider rehabilitation consult to assist with strengthening/weightbearing, etc   Outcome: Progressing     Problem: Prexisting or High Potential for Compromised Skin Integrity  Goal: Skin integrity is maintained or improved  Description  INTERVENTIONS:  - Identify patients at risk for skin breakdown  - Assess and monitor skin integrity  - Assess and monitor nutrition and hydration status  - Monitor labs   - Assess for incontinence   - Turn and reposition patient  - Assist with mobility/ambulation  - Relieve pressure over bony prominences  - Avoid friction and shearing  - Provide appropriate hygiene as needed including keeping skin clean and dry  - Evaluate need for skin moisturizer/barrier cream  - Collaborate with interdisciplinary team   - Patient/family teaching  - Consider wound care consult   Outcome: Progressing Adbry Counseling: I discussed with the patient the risks of tralokinumab including but not limited to eye infection and irritation, cold sores, injection site reactions, worsening of asthma, allergic reactions and increased risk of parasitic infection.  Live vaccines should be avoided while taking tralokinumab. The patient understands that monitoring is required and they must alert us or the primary physician if symptoms of infection or other concerning signs are noted.

## 2023-10-16 NOTE — ASSESSMENT & PLAN NOTE
Health Maintenance Due   Topic Date Due   • DTaP/Tdap/Td Vaccine (1 - Tdap) 05/02/2010   • Colorectal Cancer Risk - Colonoscopy  09/07/2022   • Influenza Vaccine (1) 09/01/2023       Patient is due for topics as listed above but is not proceeding with Immunization(s) Dtap/Tdap/Td and Influenza and Colorectal Cancer Screening: Colonoscopy at this time. Education provided for Immunization(s) Dtap/Tdap/Td and Influenza and Colorectal Cancer Screening: Colonoscopy.    Recent PHQ 2/9 Score    PHQ 2:  PHQ 2 Score Adult PHQ 2 Score Adult PHQ 2 Interpretation Little interest or pleasure in activity?   10/10/2023   9:51 AM 2 No further screening needed 1       PHQ 9:  PHQ 9 Score Adult PHQ 9 Score Adult PHQ 9 Interpretation   9/17/2021  11:02 AM 6 Mild Depression        · Dietary education and counseling

## 2024-03-18 NOTE — TELEPHONE ENCOUNTER
Left message for Blaire-NP with [facility] to return my call regarding pt  Also left message for pt  To return call in regards to coumadin and appt 
Spoke with Karan Hernandez, NP with [facility] and she states that she went to see pt  And he is a vague poor historian  At her visit she states he said he was on coumadin and was confused as to the dosing  Also had other questions regarding medications  Pt  Non complaint taking his meds  She states that pt  Said he stop taking medications due to cost   I looked in his chart and don't see where he was ever on Coumadin  Also Karan Hernandez states that he is 40lbs overweight, he has SOB, edema, legs look horrible  He diet is poor  He eats everything and anything  Drinking lots of diet soda  I tried reaching out to pt  To schedule appt  But got no answer  Will continue trying to get a hold of pt 
DASH Diet

## (undated) DEVICE — STERILE SLQ FOOT ANKLE PACK: Brand: CARDINAL HEALTH

## (undated) DEVICE — PADDING CAST 4 IN  COTTON STRL

## (undated) DEVICE — ABDOMINAL PAD: Brand: DERMACEA

## (undated) DEVICE — INTENDED FOR TISSUE SEPARATION, AND OTHER PROCEDURES THAT REQUIRE A SHARP SURGICAL BLADE TO PUNCTURE OR CUT.: Brand: BARD-PARKER ® CARBON RIB-BACK BLADES

## (undated) DEVICE — LIGHT HANDLE COVER SLEEVE DISP BLUE STELLAR

## (undated) DEVICE — SUT VICRYL 2-0 SH 27 IN UNDYED J417H

## (undated) DEVICE — SPONGE LAP 18 X 18 IN

## (undated) DEVICE — U-DRAPE: Brand: CONVERTORS

## (undated) DEVICE — GLOVE PI ULTRA TOUCH SZ.8.0

## (undated) DEVICE — CURITY STRETCH BANDAGE: Brand: CURITY

## (undated) DEVICE — GLOVE INDICATOR PI UNDERGLOVE SZ 8 BLUE

## (undated) DEVICE — PAD GROUNDING ADULT

## (undated) DEVICE — DRESSING XEROFORM 5 X 9

## (undated) DEVICE — SUT VICRYL 0 REEL 54 IN J287G

## (undated) DEVICE — PLASTER ROLL SPLINT 6 X 260 IN XFAST SET

## (undated) DEVICE — 3M™ COBAN™ NL STERILE NON-LATEX SELF-ADHERENT WRAP, 2084S, 4 IN X 5 YD (10 CM X 4,5 M), 18 ROLLS/CASE: Brand: 3M™ COBAN™

## (undated) DEVICE — NEEDLE 25G X 1 1/2

## (undated) DEVICE — SUT VICRYL 0 CT-1 27 IN J260H

## (undated) DEVICE — GAUZE SPONGES,16 PLY: Brand: CURITY

## (undated) DEVICE — OCCLUSIVE GAUZE STRIP,3% BISMUTH TRIBROMOPHENATE IN PETROLATUM BLEND: Brand: XEROFORM

## (undated) DEVICE — KERLIX BANDAGE ROLL: Brand: KERLIX

## (undated) DEVICE — BLADE SAGITTAL 25.6 X 9.5MM

## (undated) DEVICE — TUBING SUCTION 5MM X 12 FT

## (undated) DEVICE — STRETCH BANDAGE: Brand: CURITY

## (undated) DEVICE — ACE WRAP 6 IN UNSTERILE

## (undated) DEVICE — SAW BLADE RECIPROCATING SINGLE SIDED 258 ORTHO

## (undated) DEVICE — VIAL DECANTER

## (undated) DEVICE — PROXIMATE SKIN STAPLERS (35 WIDE) CONTAINS 35 STAINLESS STEEL STAPLES (FIXED HEAD): Brand: PROXIMATE

## (undated) DEVICE — CURITY NON-ADHERENT STRIPS: Brand: CURITY

## (undated) DEVICE — ELECTRODE BLADE MOD E-Z CLEAN 2.5IN 6.4CM -0012M

## (undated) DEVICE — CHLORAPREP HI-LITE 26ML ORANGE

## (undated) DEVICE — UNIVERSAL MAJOR EXTREMITY,KIT: Brand: CARDINAL HEALTH

## (undated) DEVICE — SCD SEQUENTIAL COMPRESSION COMFORT SLEEVE MEDIUM KNEE LENGTH: Brand: KENDALL SCD

## (undated) DEVICE — DRAPE EQUIPMENT RF WAND

## (undated) DEVICE — GLOVE PI ULTRA TOUCH SZ.6.5

## (undated) DEVICE — SYRINGE 10ML LL

## (undated) DEVICE — GLOVE INDICATOR PI UNDERGLOVE SZ 7.5 BLUE

## (undated) DEVICE — BULB SYRINGE,IRRIGATION WITH PROTECTIVE CAP: Brand: DOVER

## (undated) DEVICE — SUT VICRYL 3-0 SH 27 IN J416H

## (undated) DEVICE — GLOVE INDICATOR PI UNDERGLOVE SZ 6.5 BLUE